# Patient Record
Sex: MALE | Race: BLACK OR AFRICAN AMERICAN | HISPANIC OR LATINO | Employment: OTHER | URBAN - METROPOLITAN AREA
[De-identification: names, ages, dates, MRNs, and addresses within clinical notes are randomized per-mention and may not be internally consistent; named-entity substitution may affect disease eponyms.]

---

## 2017-01-19 ENCOUNTER — GENERIC CONVERSION - ENCOUNTER (OUTPATIENT)
Dept: OTHER | Facility: OTHER | Age: 77
End: 2017-01-19

## 2017-02-02 ENCOUNTER — ALLSCRIPTS OFFICE VISIT (OUTPATIENT)
Dept: OTHER | Facility: OTHER | Age: 77
End: 2017-02-02

## 2017-02-02 DIAGNOSIS — R60.0 LOCALIZED EDEMA: ICD-10-CM

## 2017-02-03 ENCOUNTER — TRANSCRIBE ORDERS (OUTPATIENT)
Dept: ADMINISTRATIVE | Facility: HOSPITAL | Age: 77
End: 2017-02-03

## 2017-02-03 ENCOUNTER — APPOINTMENT (OUTPATIENT)
Dept: LAB | Facility: HOSPITAL | Age: 77
End: 2017-02-03
Payer: COMMERCIAL

## 2017-02-03 DIAGNOSIS — N17.9 ACUTE KIDNEY FAILURE, UNSPECIFIED (HCC): Primary | ICD-10-CM

## 2017-02-03 DIAGNOSIS — R60.0 LOCALIZED EDEMA: ICD-10-CM

## 2017-02-03 LAB
ALBUMIN SERPL BCP-MCNC: 3.4 G/DL (ref 3.5–5)
ALP SERPL-CCNC: 51 U/L (ref 46–116)
ALT SERPL W P-5'-P-CCNC: 23 U/L (ref 12–78)
ANION GAP SERPL CALCULATED.3IONS-SCNC: 7 MMOL/L (ref 4–13)
AST SERPL W P-5'-P-CCNC: 22 U/L (ref 5–45)
BACTERIA UR QL AUTO: ABNORMAL /HPF
BILIRUB SERPL-MCNC: 0.49 MG/DL (ref 0.2–1)
BILIRUB UR QL STRIP: NEGATIVE
BUN SERPL-MCNC: 25 MG/DL (ref 5–25)
CALCIUM SERPL-MCNC: 8.9 MG/DL (ref 8.3–10.1)
CHLORIDE SERPL-SCNC: 112 MMOL/L (ref 100–108)
CLARITY UR: CLEAR
CO2 SERPL-SCNC: 27 MMOL/L (ref 21–32)
COLOR UR: YELLOW
CREAT SERPL-MCNC: 1.75 MG/DL (ref 0.6–1.3)
GFR SERPL CREATININE-BSD FRML MDRD: 38.3 ML/MIN/1.73SQ M
GLUCOSE SERPL-MCNC: 94 MG/DL (ref 65–140)
GLUCOSE UR STRIP-MCNC: NEGATIVE MG/DL
HGB UR QL STRIP.AUTO: NEGATIVE
HYALINE CASTS #/AREA URNS LPF: ABNORMAL /LPF
KETONES UR STRIP-MCNC: NEGATIVE MG/DL
LEUKOCYTE ESTERASE UR QL STRIP: NEGATIVE
NITRITE UR QL STRIP: NEGATIVE
NON-SQ EPI CELLS URNS QL MICRO: ABNORMAL /HPF
PH UR STRIP.AUTO: 5.5 [PH] (ref 4.5–8)
POTASSIUM SERPL-SCNC: 4.4 MMOL/L (ref 3.5–5.3)
PROT SERPL-MCNC: 7.4 G/DL (ref 6.4–8.2)
PROT UR STRIP-MCNC: ABNORMAL MG/DL
RBC #/AREA URNS AUTO: ABNORMAL /HPF
SODIUM SERPL-SCNC: 146 MMOL/L (ref 136–145)
SP GR UR STRIP.AUTO: 1.02 (ref 1–1.03)
UROBILINOGEN UR QL STRIP.AUTO: 0.2 E.U./DL
WBC #/AREA URNS AUTO: ABNORMAL /HPF

## 2017-02-03 PROCEDURE — 81001 URINALYSIS AUTO W/SCOPE: CPT

## 2017-02-03 PROCEDURE — 36415 COLL VENOUS BLD VENIPUNCTURE: CPT

## 2017-02-03 PROCEDURE — 80053 COMPREHEN METABOLIC PANEL: CPT

## 2017-02-10 ENCOUNTER — GENERIC CONVERSION - ENCOUNTER (OUTPATIENT)
Dept: OTHER | Facility: OTHER | Age: 77
End: 2017-02-10

## 2017-02-13 ENCOUNTER — HOSPITAL ENCOUNTER (OUTPATIENT)
Dept: RADIOLOGY | Facility: HOSPITAL | Age: 77
Discharge: HOME | End: 2017-02-13
Payer: COMMERCIAL

## 2017-02-13 DIAGNOSIS — N17.9 ACUTE KIDNEY FAILURE, UNSPECIFIED (HCC): ICD-10-CM

## 2017-02-13 PROCEDURE — 76770 US EXAM ABDO BACK WALL COMP: CPT

## 2017-02-15 ENCOUNTER — GENERIC CONVERSION - ENCOUNTER (OUTPATIENT)
Dept: OTHER | Facility: OTHER | Age: 77
End: 2017-02-15

## 2017-02-27 ENCOUNTER — TRANSCRIBE ORDERS (OUTPATIENT)
Dept: ADMINISTRATIVE | Facility: HOSPITAL | Age: 77
End: 2017-02-27

## 2017-02-27 ENCOUNTER — ALLSCRIPTS OFFICE VISIT (OUTPATIENT)
Dept: OTHER | Facility: OTHER | Age: 77
End: 2017-02-27

## 2017-02-27 DIAGNOSIS — R60.0 LOCALIZED EDEMA: Primary | ICD-10-CM

## 2017-03-08 ENCOUNTER — HOSPITAL ENCOUNTER (OUTPATIENT)
Dept: NON INVASIVE DIAGNOSTICS | Facility: HOSPITAL | Age: 77
Discharge: HOME | End: 2017-03-08
Payer: COMMERCIAL

## 2017-03-08 DIAGNOSIS — R60.0 LOCALIZED EDEMA: ICD-10-CM

## 2017-03-08 PROCEDURE — 93306 TTE W/DOPPLER COMPLETE: CPT

## 2017-04-21 ENCOUNTER — ALLSCRIPTS OFFICE VISIT (OUTPATIENT)
Dept: OTHER | Facility: OTHER | Age: 77
End: 2017-04-21

## 2017-04-25 ENCOUNTER — APPOINTMENT (OUTPATIENT)
Dept: LAB | Facility: HOSPITAL | Age: 77
End: 2017-04-25
Payer: COMMERCIAL

## 2017-04-25 ENCOUNTER — TRANSCRIBE ORDERS (OUTPATIENT)
Dept: LAB | Facility: HOSPITAL | Age: 77
End: 2017-04-25

## 2017-04-25 ENCOUNTER — HOSPITAL ENCOUNTER (OUTPATIENT)
Dept: RADIOLOGY | Facility: HOSPITAL | Age: 77
Discharge: HOME | End: 2017-04-25
Payer: COMMERCIAL

## 2017-04-25 ENCOUNTER — TRANSCRIBE ORDERS (OUTPATIENT)
Dept: RADIOLOGY | Facility: HOSPITAL | Age: 77
End: 2017-04-25

## 2017-04-25 DIAGNOSIS — M54.9 DORSALGIA: ICD-10-CM

## 2017-04-25 DIAGNOSIS — E66.3 OVERWEIGHT(278.02): ICD-10-CM

## 2017-04-25 DIAGNOSIS — N18.30 CHRONIC KIDNEY DISEASE, STAGE III (MODERATE) (HCC): ICD-10-CM

## 2017-04-25 DIAGNOSIS — Z00.00 ENCOUNTER FOR GENERAL ADULT MEDICAL EXAMINATION WITHOUT ABNORMAL FINDINGS: ICD-10-CM

## 2017-04-25 DIAGNOSIS — I10 ESSENTIAL (PRIMARY) HYPERTENSION: ICD-10-CM

## 2017-04-25 LAB
ALBUMIN SERPL BCP-MCNC: 3.2 G/DL (ref 3.5–5)
ALP SERPL-CCNC: 49 U/L (ref 46–116)
ALT SERPL W P-5'-P-CCNC: 22 U/L (ref 12–78)
ANION GAP SERPL CALCULATED.3IONS-SCNC: 5 MMOL/L (ref 4–13)
AST SERPL W P-5'-P-CCNC: 23 U/L (ref 5–45)
BILIRUB SERPL-MCNC: 0.38 MG/DL (ref 0.2–1)
BUN SERPL-MCNC: 28 MG/DL (ref 5–25)
CALCIUM SERPL-MCNC: 8.8 MG/DL (ref 8.3–10.1)
CHLORIDE SERPL-SCNC: 113 MMOL/L (ref 100–108)
CHOLEST SERPL-MCNC: 176 MG/DL (ref 50–200)
CO2 SERPL-SCNC: 27 MMOL/L (ref 21–32)
CREAT SERPL-MCNC: 1.73 MG/DL (ref 0.6–1.3)
EST. AVERAGE GLUCOSE BLD GHB EST-MCNC: 108 MG/DL
GFR SERPL CREATININE-BSD FRML MDRD: 38.7 ML/MIN/1.73SQ M
GLUCOSE P FAST SERPL-MCNC: 97 MG/DL (ref 65–99)
HBA1C MFR BLD: 5.4 % (ref 4.2–6.3)
HDLC SERPL-MCNC: 32 MG/DL (ref 40–60)
LDLC SERPL CALC-MCNC: 116 MG/DL (ref 0–100)
POTASSIUM SERPL-SCNC: 4.4 MMOL/L (ref 3.5–5.3)
PROT SERPL-MCNC: 7.4 G/DL (ref 6.4–8.2)
SODIUM SERPL-SCNC: 145 MMOL/L (ref 136–145)
T4 FREE SERPL-MCNC: 1.02 NG/DL (ref 0.76–1.46)
TRIGL SERPL-MCNC: 142 MG/DL
TSH SERPL DL<=0.05 MIU/L-ACNC: 4.51 UIU/ML (ref 0.36–3.74)

## 2017-04-25 PROCEDURE — 80053 COMPREHEN METABOLIC PANEL: CPT

## 2017-04-25 PROCEDURE — 84443 ASSAY THYROID STIM HORMONE: CPT

## 2017-04-25 PROCEDURE — 84439 ASSAY OF FREE THYROXINE: CPT

## 2017-04-25 PROCEDURE — 80061 LIPID PANEL: CPT

## 2017-04-25 PROCEDURE — 36415 COLL VENOUS BLD VENIPUNCTURE: CPT

## 2017-04-25 PROCEDURE — 83036 HEMOGLOBIN GLYCOSYLATED A1C: CPT

## 2017-04-25 PROCEDURE — 72110 X-RAY EXAM L-2 SPINE 4/>VWS: CPT

## 2017-05-19 ENCOUNTER — HOSPITAL ENCOUNTER (OUTPATIENT)
Dept: NON INVASIVE DIAGNOSTICS | Facility: CLINIC | Age: 77
Discharge: HOME | End: 2017-05-19
Payer: COMMERCIAL

## 2017-05-19 DIAGNOSIS — R60.0 LOCALIZED EDEMA: ICD-10-CM

## 2017-05-19 DIAGNOSIS — E66.3 OVERWEIGHT(278.02): ICD-10-CM

## 2017-05-19 PROCEDURE — 93970 EXTREMITY STUDY: CPT

## 2017-05-22 ENCOUNTER — ALLSCRIPTS OFFICE VISIT (OUTPATIENT)
Dept: OTHER | Facility: OTHER | Age: 77
End: 2017-05-22

## 2017-07-11 ENCOUNTER — GENERIC CONVERSION - ENCOUNTER (OUTPATIENT)
Dept: OTHER | Facility: OTHER | Age: 77
End: 2017-07-11

## 2017-10-03 ENCOUNTER — ALLSCRIPTS OFFICE VISIT (OUTPATIENT)
Dept: OTHER | Facility: OTHER | Age: 77
End: 2017-10-03

## 2017-10-03 DIAGNOSIS — M25.512 PAIN IN LEFT SHOULDER: ICD-10-CM

## 2017-10-03 DIAGNOSIS — M25.511 PAIN IN RIGHT SHOULDER: ICD-10-CM

## 2017-10-04 NOTE — PROGRESS NOTES
Assessment  1  Acromioclavicular joint arthritis (716 91) ([id number])    44-year-old right-hand-dominant male who has prominence of his right distal clavicle due to before meals joint arthritis  It has minimal symptoms today  In addition he has a well compensated shoulder with rotator cuff tear arthropathy  As his symptoms with regard to both modalities are minimal to nonexistent, but no treatment would be warranted  I would welcome the opportunity see this patient back in the office should problems arise     Plan  Acromioclavicular joint arthritis    · Follow-up PRN Evaluation and Treatment  Follow-up  Status: Complete  Done:  [id number] 01:43PM  Chronic pain of both shoulders    · * XR SHOULDER 2+ VIEW LEFT; Status:Active; Requested ZNQ:[id number]; History of Present Illness  HPI: 44-year-old right-hand-dominant male presents for evaluation of mass over the superior aspect of the right shoulder  It occurs without trauma, the size of the mass changes in size  It is not associated in conjunction with motion loss, or weakness  It occurs without neck pain  It occurs in a paresthesia to the right upper extremity  Review of Systems    Constitutional: No fever or chills, feels well, no tiredness, no recent weight loss or weight gain  Eyes: No complaints of red eyes, no eyesight problems  ENT: no complaints of loss of hearing, no nosebleeds, no sore throat  Cardiovascular: No complaints of chest pain, no palpitations, no leg claudication or lower extremity edema  Respiratory: No complaints of shortness of breath, no wheezing, no cough  Gastrointestinal: No complaints of abdominal pain, no constipation, no nausea or vomiting, no diarrhea or bloody stools  Genitourinary: No complaints of dysuria or incontinence, no hesitancy, no nocturia  Musculoskeletal: as noted in HPI  Integumentary: No complaints of skin rash or lesion, no itching or dry skin, no skin wounds     Neurological: No complaints of headache, no confusion, no numbness or tingling, no dizziness  Psychiatric: No suicidal thoughts, no anxiety, no depression  Endocrine: No muscle weakness, no frequent urination, no excessive thirst, no feelings of weakness  Active Problems  1  Abdominal distention (787 3) (R14 0)   2  Abdominal pain (789 00) (R10 9)   3  Acromioclavicular joint separation (831 04) (S43 109A)   4  Acute renal failure (584 9) (N17 9)   5  Back pain (724 5) (M54 9)   6  Benign essential hypertension (401 1) (I10)   7  Benign prostatic hypertrophy without urinary obstruction (600 00) (N40 0)   8  Bilateral edema of lower extremity (782 3) (R60 0)   9  Cellulitis of forearm (682 3) (L03 119)   10  Chronic cough (786 2) (R05)   11  Chronic kidney disease (CKD), stage III (moderate) (585 3) (N18 3)   12  Chronic pain of both knees (111 18,846 82) (M25 561,M25 562,G89 29)   13  Chronic pain of both shoulders (719 41,338 29) (M25 511,M25 512,G89 29)   14  Closed Fracture Of The Distal End Of The Radius (813 42)   15  Colon cancer screening (V76 51) (Z12 11)   16  Itching (698 9) (L29 9)   17  Joint pain, knee (719 46) (M25 569)   18  Kidney problem (593 9) (N28 9)   19  Left forearm pain (729 5) (M79 632)   20  Need for immunization against influenza (V04 81) (Z23)   21  Overweight (278 02) (E66 3)   22  Poison ivy dermatitis (692 6) (L23 7)   23  Primary localized osteoarthritis of both knees (715 16) (M17 0)   24  Primary osteoarthritis of left knee (715 16) (M17 12)   25  Pulmonary nodule seen on imaging study (793 11) (R91 1)   26  Renal function test abnormal (794 4) (R94 4)   27  Right knee pain (719 46) (M25 561)   28  Shortness of breath (786 05) (R06 02)   29  Shoulder joint pain, unspecified laterality    Past Medical History   · History of Arthritis (V13 4)    The active problems and past medical history were reviewed and updated today        Surgical History   · History of Appendectomy   · History of Wrist Surgery    The surgical history was reviewed and updated today  Family History  Father    · Family history of Hypertension (V17 49)    Social History   · Denied: Alcohol   · Former smoker (V15 82) (V27 576)   · Marital History - Currently    · Never a smoker    Current Meds   1  AmLODIPine Besylate 10 MG Oral Tablet; take 1 tablet by mouth once daily; Therapy: 82HFI0509 to (Evaluate:15Jan2018)  Requested for: 20Jan2017; Last   Rx:20Jan2017 Ordered   2  Losartan Potassium 50 MG Oral Tablet; TAKE 1 TABLET BY MOUTH ONCE DAILY  (PATIENT NEEDS TO MAKE AN APPOINTMENT); Therapy: 35OBV6322 to (Emily Alvarez)  Requested for: 21HYH2707; Last   Rx:41Kyo3872 Ordered   3  Omeprazole 20 MG Oral Capsule Delayed Release; TAKE 1 CAPSULE BY MOUTH   DAILY AT BEDTIME; Therapy: 02CGY9806 to (Evaluate:01Apr2018)  Requested for: 33XZT8690; Last   Rx:[id number] Ordered    Allergies  1  No Known Drug Allergies  2  No Known Environmental Allergies   3  No Known Food Allergies    Vitals  Signs   Heart Rate: 69  Systolic: 518  Diastolic: 84  Height: 5 ft 8 in  Weight: 182 lb 8 oz  BMI Calculated: 27 75  BSA Calculated: 1 97    Physical Exam  He pattern is normal without wide-based or staggering gait  His neck is nontender  His right shoulder has enlargement at the before meals joint which is nontender, nonpulsatile  His right shoulder is not effused  He has surprisingly good for flexion, surprisingly good arc of active abduction, slight limitation of internal rotation, but normal external rotation is weakness of external rotation 10 testing, soft and positive drop arm sign  The elbow is nontender  The finger has deformities consistent with osteoarthritis  Fingers are warm, sensate, and mobile  Results/Data  I personally reviewed the films/images/results in the office today  My interpretation follows     X-ray Review X-rays of the right shoulder reveal prominent arthritis in the before meals joint with enlargement of the distal clavicle  There is proximal migration of the humeral head within the glenoid  Future Appointments    Date/Time Provider Specialty Site   10/20/2017 10:30 AM NEETU Tomas   Nephrology [facility]     Signatures   Electronically signed by : NEETU Mondragon ; Oct  3 2017  1:44PM EST                       (Author)

## 2017-10-20 ENCOUNTER — ALLSCRIPTS OFFICE VISIT (OUTPATIENT)
Dept: OTHER | Facility: OTHER | Age: 77
End: 2017-10-20

## 2017-11-01 ENCOUNTER — GENERIC CONVERSION - ENCOUNTER (OUTPATIENT)
Dept: FAMILY MEDICINE CLINIC | Facility: CLINIC | Age: 77
End: 2017-11-01

## 2017-11-07 ENCOUNTER — GENERIC CONVERSION - ENCOUNTER (OUTPATIENT)
Dept: OTHER | Facility: OTHER | Age: 77
End: 2017-11-07

## 2017-11-13 ENCOUNTER — TRANSCRIBE ORDERS (OUTPATIENT)
Dept: ADMINISTRATIVE | Facility: HOSPITAL | Age: 77
End: 2017-11-13

## 2017-11-13 ENCOUNTER — ALLSCRIPTS OFFICE VISIT (OUTPATIENT)
Dept: OTHER | Facility: OTHER | Age: 77
End: 2017-11-13

## 2017-11-13 ENCOUNTER — HOSPITAL ENCOUNTER (OUTPATIENT)
Dept: RADIOLOGY | Facility: HOSPITAL | Age: 77
Discharge: HOME | End: 2017-11-13
Attending: ORTHOPAEDIC SURGERY
Payer: COMMERCIAL

## 2017-11-13 DIAGNOSIS — M25.511 PAIN IN RIGHT SHOULDER: ICD-10-CM

## 2017-11-13 DIAGNOSIS — M75.101 RIGHT ROTATOR CUFF TEAR: ICD-10-CM

## 2017-11-13 DIAGNOSIS — M25.512 PAIN IN LEFT SHOULDER: ICD-10-CM

## 2017-11-13 DIAGNOSIS — M19.211 SECONDARY OSTEOARTHRITIS OF RIGHT SHOULDER: ICD-10-CM

## 2017-11-13 DIAGNOSIS — M19.211 SECONDARY OSTEOARTHRITIS OF RIGHT SHOULDER: Primary | ICD-10-CM

## 2017-11-13 PROCEDURE — 73030 X-RAY EXAM OF SHOULDER: CPT

## 2017-11-14 NOTE — PROGRESS NOTES
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    · Follow Up After Surgery Evaluation and Treatment  Follow-up  Status: Hold For -Scheduling  Requested for: 64UDS8459   · *1 - [facility]  1 to 3 times a week for 8 weeksReverse TSA protocol - ROM, stretching and strengthlocal modalitieshome programthank you  Status: Active  Requested for: 59EKX9323  Care Summary provided  : Yes   · * CT SHOULDER RIGHT WO CONTRAST; Status:Need Information - FinancialAuthorization; Requested for:13Nov2017;   Shoulder pain, right    · * XR SHOULDER 2+ VIEW RIGHT; Status:Active - Retrospective By ProtocolAuthorization; Requested for:13Nov2017;     Discussion/Summary    The patient has symptomatic right shoulder rotator cuff tear arthropathy as well as a symptomatic 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 ( which is secondary to the rotator cuff tear arthropathy)  the cyst can be excised but has a very high rate of recurrence unless we addressed the underlying rotator cuff tear arthropathy and certainly a cyst excision will not help the rotator cuff tear arthropathy symptoms that he is experiencing  In order to treat the symptoms and remove the cyst he is indicated for reverse total shoulder arthroplasty of the right shoulder with excision of the cyst A thorough discussion was performed with the patient reviewing all operative and nonoperative options as well as the risks of the procedure   Risks discussed include but not limited to persistent pain, dislocation, loosening of the prosthesis, infection, need for further surgery including revision, any remaining rotator cuff rupture , neurovascular injury, as well as the risk of anesthesia  After this discussion all questions were answered and informed consent was obtained for Right Reverse Total Shoulder Arthroplasty with excision of ganglion cyst Zach Roe MD interviewed and examined the patient, reviewed the diagnostic imaging and developed/ implemented the plan of care as described in my discussion  The visit was performed with the assistance of my physician assistant Ms Humberto Ramirez who scribed some parts of the note  I personally wrote and developed the discussion  The patient, patient's family was counseled regarding diagnostic results,-- prognosis,-- risks and benefits of treatment options  The patient has the current Goals: Pain-free function of right shoulder  The patent has the current Barriers: No perceived barriers  The risks and benefits of surgery were reviewed with the patient/guardian inclusive of but not limited to infection, failure to alleviate discomfort, failure of procedure, nerve injury, stiffness, blood clots and need for further surgery   Patient is able to Self-Care  Self Referrals: No      History of Present Illness  Kingsley Andino is a 77 y/o male with right shoulder pain for years, but has developed swelling in the right shoulder  The swelling in the right shoulder started about 3 months ago and has worsened  He has noticed some redness over the lump lately  The lump is painful and rubs on his clothing  He is aware he has severe arthritis of the right shoulder and has not had any treatment such as injections for the right shoulder  He has crepitation of the right shoulder  Only has pain from the shoulder joint with motion  He denies numbness or tingling  He denies known injury or trauma to the right shoulder  Review of Systems   Constitutional: No fever or chills, feels well, no tiredness, no recent weight loss or weight gain    Cardiovascular: No complaints of chest pain, no palpitations, no leg claudication or lower extremity edema  Respiratory: No complaints of shortness of breath, no wheezing, no cough  Gastrointestinal: No complaints of abdominal pain, no constipation, no nausea or vomiting, no diarrhea or bloody stools  Musculoskeletal: as noted in HPI  Integumentary: No complaints of skin rash or lesion, no itching or dry skin, no skin wounds  ROS reviewed  Active Problems  1  Abdominal distention (787 3) (R14 0)   2  Abdominal pain (789 00) (R10 9)   3  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)   4  Acute renal failure (584 9) (N17 9)   5  Back pain (724 5) (M54 9)   6  Benign essential hypertension (401 1) (I10)   7  Benign prostatic hypertrophy without urinary obstruction (600 00) (N40 0)   8  Bilateral edema of lower extremity (782 3) (R60 0)   9  Cellulitis of forearm (682 3) (L03 119)   10  Chronic cough (786 2) (R05)   11  Chronic kidney disease (CKD), stage III (moderate) (585 3) (N18 3)   12  Chronic pain of both knees (966 02,917 75) (M25 561,M25 562,G89 29)   13  Chronic pain of both shoulders (719 41,338 29) (M25 511,M25 512,G89 29)   14  Closed Fracture Of The Distal End Of The Radius (813 42)   15  Colon cancer screening (V76 51) (Z12 11)   16  Itching (698 9) (L29 9)   17  Joint pain, knee (719 46) (M25 569)   18  Kidney problem (593 9) (N28 9)   19  Left forearm pain (729 5) (M79 632)   20  Need for immunization against influenza (V04 81) (Z23)   21  Overweight (278 02) (E66 3)   22  Poison ivy dermatitis (692 6) (L23 7)   23  Primary localized osteoarthritis of both knees (715 16) (M17 0)   24  Primary osteoarthritis of left knee (715 16) (M17 12)   25  Pulmonary nodule seen on imaging study (793 11) (R91 1)   26  Renal function test abnormal (794 4) (R94 4)   27  Right knee pain (719 46) (M25 561)   28  Shortness of breath (786 05) (R06 02)   29  Shoulder joint pain, unspecified laterality   30   Shoulder pain, right (289 41) (M23 139)    Past Medical History   · History of Arthritis (V13 4)    The active problems and past medical history were reviewed and updated today  Surgical History   · History of Appendectomy   · History of Wrist Surgery    The surgical history was reviewed and updated today  Family History  Father    · Family history of Hypertension (V17 49)  Family History    · Family history of arthritis (V17 7) (Z82 61)    The family history was reviewed and updated today  Social History     · Denied: Alcohol   · Drinks coffee   · Former smoker (V15 82) (X32 018)   · Marital History - Currently    · Never a smoker  The social history was reviewed and updated today  Current Meds   1  AmLODIPine Besylate 10 MG Oral Tablet; take 1 tablet by mouth once daily; Therapy: 38RWW7326 to (Evaluate:15Jan2018)  Requested for: 20Jan2017; Last Rx:20Jan2017 Ordered   2  Losartan Potassium 50 MG Oral Tablet; TAKE 1 TABLET BY MOUTH ONCE DAILY  (PATIENT NEEDS TO MAKE AN APPOINTMENT); Therapy: 29IKB7160 to (Lois Kruger)  Requested for: 00PUB5325; Last Rx:[id number] Ordered   3  Omeprazole 20 MG Oral Capsule Delayed Release; TAKE 1 CAPSULE BY MOUTH DAILY AT BEDTIME; Therapy: 00BRH3755 to (Evaluate:01Apr2018)  Requested for: 31GCD7207; Last Rx:58Hxs4890 Ordered    The medication list was reviewed and updated today  Allergies  1  No Known Drug Allergies  2  No Known Environmental Allergies   3  No Known Food Allergies    Vitals   Recorded: 90RCR8015 01:01PM   Heart Rate 66   Systolic 073, Sitting   Diastolic 89, Sitting       Physical Exam    Right Shoulder: Appearance: AC joint hypertrophy,-- erythema,-- shoulder is swollen (acromioclavicular joint )-- and-- No GH effusion, but-- no dislocation-- and-- no ecchymosis  Tenderness: AC joint, but-- not the deltoid-- and-- not the trapezial  Palpatory findings include crepitus  ROM: Full except as noted: Motor: 4/5 abduction-- and-- 4/5 external rotation  External rotation: painful restricted AROM 30 degrees   Special Tests: positive Painful Arc,-- positive Empty Can test-- and-- positive Cross Body Adduction test, but-- negative Tinsley test,-- negative Neer test-- and-- negative Drop Arm test   Constitutional - General appearance: Normal   Musculoskeletal - Muscle strength/tone: Normal -- Upper extremity compartments: Normal   Neurologic - Sensation: Normal -- Upper extremity peripheral neuro exam: Normal   Psychiatric - Orientation to person, place, and time: Normal -- Mood and affect: Normal       Results/Data  I personally reviewed the films/images/results in the office today  My interpretation follows  X-ray Review 3 views right shoulder: advanced GH arthritis w/acetabularization of the acromion  AC joint arthritis w/osteophyte formation  Future Appointments    Date/Time Provider Specialty Site   12/18/2017 01:00 PM Zachary Carreon [facility] Orthopedic Surgery [facility] [place] [address] [facility]   12/05/2017 08:30 AM NEETU Farrell  [address] [place] [address] OR       Surgery Scheduling Form  Surgery Schedule Form [facility] Standard:  Location: [place]   Confirmation Number:   PROCEDURE DETAILS   Procedure Date:   Requested Time:  Surgeon: Deborah   Co-Surgeon:   [facility] Required:  Procedure: Right Reverse total shoulder with open cyst excision   Bed:  Laterality/Level: Right  Case Length: 1 5 hours  Anticipated frozen section: NO  Anesthesia: General w/Regional    Procedure Codes: 54537, 33878  Pre-op diagnosis: Right shoulder osteoarthritis with cyst  Diagnosis Code(s): M19 211; M25 819   Equipment:  Equipment Needs: Tornier, ortho set  Implants: Tornier reverse    Is the patient able to walk up a flight of stairs, walk up a hill or do heavy housework WITHOUT having chest pain or shortness of breath?  YES    REGISTRATION & FINANCIAL CLEARANCE   FA Initials:   Insurance:   Policy Number: Group Number:     PRE-ADMISSION TESTING/CLINICAL INFORMATION   PAT Location:       CONSULTS NEEDED: Anesthesia Consult:   Medical Consult:   Cardiac Consult:    ALLERGIES AND ALERTS     Latex Allergy:   Penicillin Allergy:   Malignant Hyperthermia:   Diabetic Patient:     ERAS Patient:   COMMENTS   Scheduling Information Provided By:     CASE MANAGEMENT:      Signatures   Electronically signed by : NEETU Hendrickson ; Nov 13 2017  1:43PM EST                       (Author)

## 2017-11-17 ENCOUNTER — TRANSCRIBE ORDERS (OUTPATIENT)
Dept: LAB | Facility: HOSPITAL | Age: 77
End: 2017-11-17

## 2017-11-17 ENCOUNTER — APPOINTMENT (OUTPATIENT)
Dept: LAB | Facility: HOSPITAL | Age: 77
End: 2017-11-17
Attending: ORTHOPAEDIC SURGERY
Payer: COMMERCIAL

## 2017-11-17 DIAGNOSIS — M19.211 SECONDARY OSTEOARTHRITIS OF RIGHT SHOULDER: ICD-10-CM

## 2017-11-17 DIAGNOSIS — M19.211 SECONDARY OSTEOARTHRITIS OF RIGHT SHOULDER: Primary | ICD-10-CM

## 2017-11-17 LAB
ABO GROUP BLD: NORMAL
ALBUMIN SERPL BCP-MCNC: 3.3 G/DL (ref 3.5–5)
ALP SERPL-CCNC: 54 U/L (ref 46–116)
ALT SERPL W P-5'-P-CCNC: 23 U/L (ref 12–78)
ANION GAP SERPL CALCULATED.3IONS-SCNC: 6 MMOL/L (ref 4–13)
AST SERPL W P-5'-P-CCNC: 27 U/L (ref 5–45)
ATRIAL RATE: 60 BPM
BASOPHILS # BLD AUTO: 0.01 THOUSANDS/ΜL (ref 0–0.1)
BASOPHILS NFR BLD AUTO: 0 % (ref 0–1)
BILIRUB SERPL-MCNC: 0.36 MG/DL (ref 0.2–1)
BLD GP AB SCN SERPL QL: NEGATIVE
BUN SERPL-MCNC: 22 MG/DL (ref 5–25)
CALCIUM SERPL-MCNC: 8.8 MG/DL (ref 8.3–10.1)
CHLORIDE SERPL-SCNC: 113 MMOL/L (ref 100–108)
CO2 SERPL-SCNC: 27 MMOL/L (ref 21–32)
CREAT SERPL-MCNC: 1.51 MG/DL (ref 0.6–1.3)
EOSINOPHIL # BLD AUTO: 0.25 THOUSAND/ΜL (ref 0–0.61)
EOSINOPHIL NFR BLD AUTO: 4 % (ref 0–6)
ERYTHROCYTE [DISTWIDTH] IN BLOOD BY AUTOMATED COUNT: 13.9 % (ref 11.6–15.1)
EST. AVERAGE GLUCOSE BLD GHB EST-MCNC: 105 MG/DL
GFR SERPL CREATININE-BSD FRML MDRD: 45 ML/MIN/1.73SQ M
GLUCOSE P FAST SERPL-MCNC: 86 MG/DL (ref 65–99)
HBA1C MFR BLD: 5.3 % (ref 4.2–6.3)
HCT VFR BLD AUTO: 43.5 % (ref 36.5–49.3)
HGB BLD-MCNC: 14.8 G/DL (ref 12–17)
LYMPHOCYTES # BLD AUTO: 1.78 THOUSANDS/ΜL (ref 0.6–4.47)
LYMPHOCYTES NFR BLD AUTO: 27 % (ref 14–44)
MCH RBC QN AUTO: 30.3 PG (ref 26.8–34.3)
MCHC RBC AUTO-ENTMCNC: 34 G/DL (ref 31.4–37.4)
MCV RBC AUTO: 89 FL (ref 82–98)
MONOCYTES # BLD AUTO: 0.8 THOUSAND/ΜL (ref 0.17–1.22)
MONOCYTES NFR BLD AUTO: 12 % (ref 4–12)
NEUTROPHILS # BLD AUTO: 3.62 THOUSANDS/ΜL (ref 1.85–7.62)
NEUTS SEG NFR BLD AUTO: 57 % (ref 43–75)
NRBC BLD AUTO-RTO: 0 /100 WBCS
P AXIS: 63 DEGREES
PLATELET # BLD AUTO: 215 THOUSANDS/UL (ref 149–390)
PMV BLD AUTO: 10.2 FL (ref 8.9–12.7)
POTASSIUM SERPL-SCNC: 3.9 MMOL/L (ref 3.5–5.3)
PR INTERVAL: 202 MS
PROT SERPL-MCNC: 7.4 G/DL (ref 6.4–8.2)
QRS AXIS: 50 DEGREES
QRSD INTERVAL: 78 MS
QT INTERVAL: 428 MS
QTC INTERVAL: 428 MS
RBC # BLD AUTO: 4.88 MILLION/UL (ref 3.88–5.62)
RH BLD: POSITIVE
SODIUM SERPL-SCNC: 146 MMOL/L (ref 136–145)
SPECIMEN EXPIRATION DATE: NORMAL
T WAVE AXIS: 75 DEGREES
VENTRICULAR RATE: 60 BPM
WBC # BLD AUTO: 6.49 THOUSAND/UL (ref 4.31–10.16)

## 2017-11-17 PROCEDURE — 93005 ELECTROCARDIOGRAM TRACING: CPT

## 2017-11-17 PROCEDURE — 86900 BLOOD TYPING SEROLOGIC ABO: CPT

## 2017-11-17 PROCEDURE — 86850 RBC ANTIBODY SCREEN: CPT

## 2017-11-17 PROCEDURE — 80053 COMPREHEN METABOLIC PANEL: CPT

## 2017-11-17 PROCEDURE — 36415 COLL VENOUS BLD VENIPUNCTURE: CPT

## 2017-11-17 PROCEDURE — 86901 BLOOD TYPING SEROLOGIC RH(D): CPT

## 2017-11-17 PROCEDURE — 85025 COMPLETE CBC W/AUTO DIFF WBC: CPT

## 2017-11-17 PROCEDURE — 83036 HEMOGLOBIN GLYCOSYLATED A1C: CPT

## 2017-11-20 ENCOUNTER — HOSPITAL ENCOUNTER (OUTPATIENT)
Dept: RADIOLOGY | Facility: HOSPITAL | Age: 77
Discharge: HOME | End: 2017-11-20
Payer: COMMERCIAL

## 2017-11-20 DIAGNOSIS — M19.211 SECONDARY OSTEOARTHRITIS OF RIGHT SHOULDER: ICD-10-CM

## 2017-11-20 PROCEDURE — 71020 HB CHEST X-RAY 2VW FRONTAL&LATL: CPT

## 2017-11-20 PROCEDURE — 73200 CT UPPER EXTREMITY W/O DYE: CPT

## 2017-11-27 RX ORDER — TAMSULOSIN HYDROCHLORIDE 0.4 MG/1
0.4 CAPSULE ORAL
COMMUNITY
End: 2019-04-05 | Stop reason: ALTCHOICE

## 2017-11-27 RX ORDER — LOSARTAN POTASSIUM 50 MG/1
25 TABLET ORAL DAILY
COMMUNITY
End: 2017-12-06 | Stop reason: HOSPADM

## 2017-11-27 RX ORDER — OMEPRAZOLE 20 MG/1
20 CAPSULE, DELAYED RELEASE ORAL DAILY
COMMUNITY
End: 2018-04-13 | Stop reason: SDUPTHER

## 2017-11-27 RX ORDER — AMLODIPINE BESYLATE 10 MG/1
10 TABLET ORAL DAILY
COMMUNITY
End: 2018-01-26 | Stop reason: SDUPTHER

## 2017-11-27 NOTE — PRE-PROCEDURE INSTRUCTIONS
Pre-Surgery Instructions:   Medication Instructions    amLODIPine (NORVASC) 10 mg tablet Patient was instructed per "E-Preop"    losartan (COZAAR) 50 mg tablet Patient was instructed per "E-Preop"    omeprazole (PriLOSEC) 20 mg delayed release capsule Patient was instructed per "E-Preop"    tamsulosin (FLOMAX) 0 4 mg Patient was instructed per "E-Preop"     Completed Name Description/Comments Triggered By System Rec        Medication Instruction (ACE/ARB - Blood Pressure Medication)    Please do not take this medication on the morning of your day of surgery  Please restart your medications as soon as clinically feasible  Losartan Potassium 50 MG Oral Tablet          Calcium Blocker (Blood Pressure Medication)    Please continue to take this medication on your normal schedule  If this is an oral medication and you take in the morning, you may do so with a sip of water  AmLODIPine Besylate 10 MG Oral Tablet          Dialysis    If undergoing dialysis, please perform 24 to 48 before surgery and avoid interfering with dialysis schedule  Kidney problem               Accept All Complete All   Last signed: Never    Request Signature   Add New Task Show Removed Tasks  Pre procedure instructions given to daughter Juliana Velázquez  Verbalizes understanding

## 2017-11-28 ENCOUNTER — ALLSCRIPTS OFFICE VISIT (OUTPATIENT)
Dept: OTHER | Facility: OTHER | Age: 77
End: 2017-11-28

## 2017-12-04 ENCOUNTER — APPOINTMENT (OUTPATIENT)
Dept: PHYSICAL THERAPY | Age: 77
End: 2017-12-04
Payer: COMMERCIAL

## 2017-12-04 ENCOUNTER — GENERIC CONVERSION - ENCOUNTER (OUTPATIENT)
Dept: OBGYN CLINIC | Facility: OTHER | Age: 77
End: 2017-12-04

## 2017-12-04 DIAGNOSIS — M19.211 SECONDARY OSTEOARTHRITIS OF RIGHT SHOULDER: ICD-10-CM

## 2017-12-04 PROCEDURE — G8985 CARRY GOAL STATUS: HCPCS

## 2017-12-04 PROCEDURE — 97162 PT EVAL MOD COMPLEX 30 MIN: CPT

## 2017-12-04 PROCEDURE — G8984 CARRY CURRENT STATUS: HCPCS

## 2017-12-05 ENCOUNTER — HOSPITAL ENCOUNTER (INPATIENT)
Facility: HOSPITAL | Age: 77
LOS: 1 days | Discharge: HOME | DRG: 483 | End: 2017-12-06
Attending: ORTHOPAEDIC SURGERY | Admitting: ORTHOPAEDIC SURGERY
Payer: COMMERCIAL

## 2017-12-05 ENCOUNTER — APPOINTMENT (INPATIENT)
Dept: RADIOLOGY | Facility: HOSPITAL | Age: 77
DRG: 483 | End: 2017-12-05
Attending: ORTHOPAEDIC SURGERY
Payer: COMMERCIAL

## 2017-12-05 ENCOUNTER — ANESTHESIA (OUTPATIENT)
Dept: PERIOP | Facility: HOSPITAL | Age: 77
DRG: 483 | End: 2017-12-05
Payer: COMMERCIAL

## 2017-12-05 ENCOUNTER — ANESTHESIA EVENT (OUTPATIENT)
Dept: PERIOP | Facility: HOSPITAL | Age: 77
DRG: 483 | End: 2017-12-05
Payer: COMMERCIAL

## 2017-12-05 DIAGNOSIS — M12.811 ROTATOR CUFF TEAR ARTHROPATHY, RIGHT: Primary | Chronic | ICD-10-CM

## 2017-12-05 DIAGNOSIS — M75.101 ROTATOR CUFF TEAR ARTHROPATHY, RIGHT: Primary | Chronic | ICD-10-CM

## 2017-12-05 DIAGNOSIS — I10 ESSENTIAL HYPERTENSION, BENIGN: ICD-10-CM

## 2017-12-05 DIAGNOSIS — N18.30 STAGE 3 CHRONIC KIDNEY DISEASE (HCC): ICD-10-CM

## 2017-12-05 PROBLEM — M19.211 SECONDARY OSTEOARTHRITIS OF RIGHT SHOULDER: Status: ACTIVE | Noted: 2017-12-05

## 2017-12-05 LAB
ABO GROUP BLD: NORMAL
BLD GP AB SCN SERPL QL: NEGATIVE
POTASSIUM SERPL-SCNC: 4.1 MMOL/L (ref 3.5–5.3)
RH BLD: POSITIVE
SPECIMEN EXPIRATION DATE: NORMAL

## 2017-12-05 PROCEDURE — C1776 JOINT DEVICE (IMPLANTABLE): HCPCS | Performed by: ORTHOPAEDIC SURGERY

## 2017-12-05 PROCEDURE — 86850 RBC ANTIBODY SCREEN: CPT | Performed by: ORTHOPAEDIC SURGERY

## 2017-12-05 PROCEDURE — 0RBJ0ZZ EXCISION OF RIGHT SHOULDER JOINT, OPEN APPROACH: ICD-10-PCS | Performed by: ORTHOPAEDIC SURGERY

## 2017-12-05 PROCEDURE — C1713 ANCHOR/SCREW BN/BN,TIS/BN: HCPCS | Performed by: ORTHOPAEDIC SURGERY

## 2017-12-05 PROCEDURE — 84132 ASSAY OF SERUM POTASSIUM: CPT | Performed by: ORTHOPAEDIC SURGERY

## 2017-12-05 PROCEDURE — 86900 BLOOD TYPING SEROLOGIC ABO: CPT | Performed by: ORTHOPAEDIC SURGERY

## 2017-12-05 PROCEDURE — 86901 BLOOD TYPING SEROLOGIC RH(D): CPT | Performed by: ORTHOPAEDIC SURGERY

## 2017-12-05 PROCEDURE — 0RRJ00Z REPLACEMENT OF RIGHT SHOULDER JOINT WITH REVERSE BALL AND SOCKET SYNTHETIC SUBSTITUTE, OPEN APPROACH: ICD-10-PCS | Performed by: ORTHOPAEDIC SURGERY

## 2017-12-05 PROCEDURE — 73020 X-RAY EXAM OF SHOULDER: CPT

## 2017-12-05 DEVICE — SCREW PERIPHERAL 5 X 34MM: Type: IMPLANTABLE DEVICE | Site: SHOULDER | Status: FUNCTIONAL

## 2017-12-05 DEVICE — GLENOSPHERE STD 36MM: Type: IMPLANTABLE DEVICE | Site: SHOULDER | Status: FUNCTIONAL

## 2017-12-05 DEVICE — BASEPLATE STD 25MM: Type: IMPLANTABLE DEVICE | Site: SHOULDER | Status: FUNCTIONAL

## 2017-12-05 DEVICE — IMPLANTABLE DEVICE
Type: IMPLANTABLE DEVICE | Site: SHOULDER | Status: FUNCTIONAL
Brand: FLEX SHOULDER SYSTEM

## 2017-12-05 DEVICE — IMPLANTABLE DEVICE
Type: IMPLANTABLE DEVICE | Site: SHOULDER | Status: FUNCTIONAL
Brand: AEQUALIS™ ASCEND™ FLEX

## 2017-12-05 DEVICE — SCREW PERIPHERAL 5 X 26MM: Type: IMPLANTABLE DEVICE | Site: SHOULDER | Status: FUNCTIONAL

## 2017-12-05 DEVICE — SCREW CENTRAL 6 X 40MM: Type: IMPLANTABLE DEVICE | Site: SHOULDER | Status: FUNCTIONAL

## 2017-12-05 RX ORDER — SODIUM CHLORIDE, SODIUM LACTATE, POTASSIUM CHLORIDE, CALCIUM CHLORIDE 600; 310; 30; 20 MG/100ML; MG/100ML; MG/100ML; MG/100ML
100 INJECTION, SOLUTION INTRAVENOUS CONTINUOUS
Status: DISPENSED | OUTPATIENT
Start: 2017-12-05 | End: 2017-12-06

## 2017-12-05 RX ORDER — OXYCODONE HYDROCHLORIDE 10 MG/1
10 TABLET ORAL EVERY 4 HOURS PRN
Status: DISCONTINUED | OUTPATIENT
Start: 2017-12-05 | End: 2017-12-06 | Stop reason: HOSPADM

## 2017-12-05 RX ORDER — LABETALOL HYDROCHLORIDE 5 MG/ML
5 INJECTION, SOLUTION INTRAVENOUS
Status: DISCONTINUED | OUTPATIENT
Start: 2017-12-05 | End: 2017-12-05 | Stop reason: HOSPADM

## 2017-12-05 RX ORDER — FENTANYL CITRATE/PF 50 MCG/ML
25 SYRINGE (ML) INJECTION
Status: DISCONTINUED | OUTPATIENT
Start: 2017-12-05 | End: 2017-12-05 | Stop reason: HOSPADM

## 2017-12-05 RX ORDER — MAGNESIUM HYDROXIDE 1200 MG/15ML
LIQUID ORAL AS NEEDED
Status: DISCONTINUED | OUTPATIENT
Start: 2017-12-05 | End: 2017-12-05 | Stop reason: HOSPADM

## 2017-12-05 RX ORDER — DOCUSATE SODIUM 100 MG/1
100 CAPSULE, LIQUID FILLED ORAL 2 TIMES DAILY
Status: DISCONTINUED | OUTPATIENT
Start: 2017-12-05 | End: 2017-12-06 | Stop reason: HOSPADM

## 2017-12-05 RX ORDER — PROPOFOL 10 MG/ML
INJECTION, EMULSION INTRAVENOUS AS NEEDED
Status: DISCONTINUED | OUTPATIENT
Start: 2017-12-05 | End: 2017-12-05 | Stop reason: SURG

## 2017-12-05 RX ORDER — LIDOCAINE HYDROCHLORIDE 10 MG/ML
INJECTION, SOLUTION INFILTRATION; PERINEURAL AS NEEDED
Status: DISCONTINUED | OUTPATIENT
Start: 2017-12-05 | End: 2017-12-05 | Stop reason: SURG

## 2017-12-05 RX ORDER — TAMSULOSIN HYDROCHLORIDE 0.4 MG/1
0.4 CAPSULE ORAL
Status: DISCONTINUED | OUTPATIENT
Start: 2017-12-05 | End: 2017-12-06 | Stop reason: HOSPADM

## 2017-12-05 RX ORDER — HYDRALAZINE HYDROCHLORIDE 25 MG/1
25 TABLET, FILM COATED ORAL EVERY 8 HOURS PRN
Status: DISCONTINUED | OUTPATIENT
Start: 2017-12-05 | End: 2017-12-06 | Stop reason: HOSPADM

## 2017-12-05 RX ORDER — GLYCOPYRROLATE 0.2 MG/ML
INJECTION INTRAMUSCULAR; INTRAVENOUS AS NEEDED
Status: DISCONTINUED | OUTPATIENT
Start: 2017-12-05 | End: 2017-12-05 | Stop reason: SURG

## 2017-12-05 RX ORDER — AMLODIPINE BESYLATE 10 MG/1
10 TABLET ORAL DAILY
Status: DISCONTINUED | OUTPATIENT
Start: 2017-12-05 | End: 2017-12-06

## 2017-12-05 RX ORDER — LOSARTAN POTASSIUM 50 MG/1
25 TABLET ORAL DAILY
Status: DISCONTINUED | OUTPATIENT
Start: 2017-12-05 | End: 2017-12-05

## 2017-12-05 RX ORDER — PANTOPRAZOLE SODIUM 40 MG/1
40 TABLET, DELAYED RELEASE ORAL
Status: DISCONTINUED | OUTPATIENT
Start: 2017-12-05 | End: 2017-12-06 | Stop reason: HOSPADM

## 2017-12-05 RX ORDER — SUCCINYLCHOLINE CHLORIDE 20 MG/ML
INJECTION INTRAMUSCULAR; INTRAVENOUS AS NEEDED
Status: DISCONTINUED | OUTPATIENT
Start: 2017-12-05 | End: 2017-12-05 | Stop reason: SURG

## 2017-12-05 RX ORDER — SODIUM CHLORIDE, SODIUM LACTATE, POTASSIUM CHLORIDE, CALCIUM CHLORIDE 600; 310; 30; 20 MG/100ML; MG/100ML; MG/100ML; MG/100ML
125 INJECTION, SOLUTION INTRAVENOUS CONTINUOUS
Status: DISCONTINUED | OUTPATIENT
Start: 2017-12-05 | End: 2017-12-05

## 2017-12-05 RX ORDER — ACETAMINOPHEN 325 MG/1
650 TABLET ORAL EVERY 6 HOURS PRN
Status: DISCONTINUED | OUTPATIENT
Start: 2017-12-05 | End: 2017-12-06 | Stop reason: HOSPADM

## 2017-12-05 RX ORDER — CALCIUM CARBONATE 200(500)MG
1000 TABLET,CHEWABLE ORAL DAILY PRN
Status: DISCONTINUED | OUTPATIENT
Start: 2017-12-05 | End: 2017-12-06 | Stop reason: HOSPADM

## 2017-12-05 RX ORDER — ALBUTEROL SULFATE 2.5 MG/3ML
2.5 SOLUTION RESPIRATORY (INHALATION) ONCE AS NEEDED
Status: DISCONTINUED | OUTPATIENT
Start: 2017-12-05 | End: 2017-12-05 | Stop reason: HOSPADM

## 2017-12-05 RX ORDER — ROPIVACAINE HYDROCHLORIDE 5 MG/ML
INJECTION, SOLUTION EPIDURAL; INFILTRATION; PERINEURAL AS NEEDED
Status: DISCONTINUED | OUTPATIENT
Start: 2017-12-05 | End: 2017-12-05 | Stop reason: SURG

## 2017-12-05 RX ORDER — ONDANSETRON 2 MG/ML
4 INJECTION INTRAMUSCULAR; INTRAVENOUS ONCE AS NEEDED
Status: DISCONTINUED | OUTPATIENT
Start: 2017-12-05 | End: 2017-12-05 | Stop reason: HOSPADM

## 2017-12-05 RX ORDER — EPHEDRINE SULFATE 50 MG/ML
INJECTION, SOLUTION INTRAVENOUS AS NEEDED
Status: DISCONTINUED | OUTPATIENT
Start: 2017-12-05 | End: 2017-12-05 | Stop reason: SURG

## 2017-12-05 RX ORDER — MIDAZOLAM HYDROCHLORIDE 1 MG/ML
INJECTION INTRAMUSCULAR; INTRAVENOUS AS NEEDED
Status: DISCONTINUED | OUTPATIENT
Start: 2017-12-05 | End: 2017-12-05 | Stop reason: SURG

## 2017-12-05 RX ORDER — OXYCODONE HYDROCHLORIDE 5 MG/1
5 TABLET ORAL EVERY 4 HOURS PRN
Status: DISCONTINUED | OUTPATIENT
Start: 2017-12-05 | End: 2017-12-06 | Stop reason: HOSPADM

## 2017-12-05 RX ORDER — ONDANSETRON 2 MG/ML
INJECTION INTRAMUSCULAR; INTRAVENOUS AS NEEDED
Status: DISCONTINUED | OUTPATIENT
Start: 2017-12-05 | End: 2017-12-05 | Stop reason: SURG

## 2017-12-05 RX ORDER — OXYCODONE HYDROCHLORIDE 5 MG/1
TABLET ORAL
Qty: 30 TABLET | Refills: 0 | Status: SHIPPED | OUTPATIENT
Start: 2017-12-05 | End: 2019-04-05 | Stop reason: ALTCHOICE

## 2017-12-05 RX ORDER — ROCURONIUM BROMIDE 10 MG/ML
INJECTION, SOLUTION INTRAVENOUS AS NEEDED
Status: DISCONTINUED | OUTPATIENT
Start: 2017-12-05 | End: 2017-12-05 | Stop reason: SURG

## 2017-12-05 RX ORDER — ONDANSETRON 2 MG/ML
4 INJECTION INTRAMUSCULAR; INTRAVENOUS EVERY 6 HOURS PRN
Status: DISCONTINUED | OUTPATIENT
Start: 2017-12-05 | End: 2017-12-06 | Stop reason: HOSPADM

## 2017-12-05 RX ORDER — FENTANYL CITRATE 50 UG/ML
INJECTION, SOLUTION INTRAMUSCULAR; INTRAVENOUS AS NEEDED
Status: DISCONTINUED | OUTPATIENT
Start: 2017-12-05 | End: 2017-12-05 | Stop reason: SURG

## 2017-12-05 RX ORDER — MEPERIDINE HYDROCHLORIDE 25 MG/ML
12.5 INJECTION INTRAMUSCULAR; INTRAVENOUS; SUBCUTANEOUS AS NEEDED
Status: DISCONTINUED | OUTPATIENT
Start: 2017-12-05 | End: 2017-12-05 | Stop reason: HOSPADM

## 2017-12-05 RX ADMIN — ROCURONIUM BROMIDE 40 MG: 10 INJECTION INTRAVENOUS at 08:00

## 2017-12-05 RX ADMIN — DOCUSATE SODIUM 100 MG: 100 CAPSULE, LIQUID FILLED ORAL at 11:16

## 2017-12-05 RX ADMIN — PANTOPRAZOLE SODIUM 40 MG: 40 TABLET, DELAYED RELEASE ORAL at 11:16

## 2017-12-05 RX ADMIN — LOSARTAN POTASSIUM 25 MG: 50 TABLET, FILM COATED ORAL at 11:16

## 2017-12-05 RX ADMIN — FENTANYL CITRATE 100 MCG: 50 INJECTION, SOLUTION INTRAMUSCULAR; INTRAVENOUS at 07:46

## 2017-12-05 RX ADMIN — CEFAZOLIN SODIUM 2000 MG: 2 SOLUTION INTRAVENOUS at 17:43

## 2017-12-05 RX ADMIN — SODIUM CHLORIDE, SODIUM LACTATE, POTASSIUM CHLORIDE, AND CALCIUM CHLORIDE: .6; .31; .03; .02 INJECTION, SOLUTION INTRAVENOUS at 09:14

## 2017-12-05 RX ADMIN — MIDAZOLAM HYDROCHLORIDE 2 MG: 1 INJECTION, SOLUTION INTRAMUSCULAR; INTRAVENOUS at 07:37

## 2017-12-05 RX ADMIN — NEOSTIGMINE METHYLSULFATE 4 MG: 1 INJECTION, SOLUTION INTRAMUSCULAR; INTRAVENOUS; SUBCUTANEOUS at 09:11

## 2017-12-05 RX ADMIN — EPHEDRINE SULFATE 5 MG: 50 INJECTION, SOLUTION INTRAMUSCULAR; INTRAVENOUS; SUBCUTANEOUS at 08:00

## 2017-12-05 RX ADMIN — TAMSULOSIN HYDROCHLORIDE 0.4 MG: 0.4 CAPSULE ORAL at 17:42

## 2017-12-05 RX ADMIN — EPHEDRINE SULFATE 10 MG: 50 INJECTION, SOLUTION INTRAMUSCULAR; INTRAVENOUS; SUBCUTANEOUS at 07:51

## 2017-12-05 RX ADMIN — SODIUM CHLORIDE, POTASSIUM CHLORIDE, SODIUM LACTATE AND CALCIUM CHLORIDE 100 ML/HR: 600; 310; 30; 20 INJECTION, SOLUTION INTRAVENOUS at 11:20

## 2017-12-05 RX ADMIN — DOCUSATE SODIUM 100 MG: 100 CAPSULE, LIQUID FILLED ORAL at 17:42

## 2017-12-05 RX ADMIN — EPHEDRINE SULFATE 10 MG: 50 INJECTION, SOLUTION INTRAMUSCULAR; INTRAVENOUS; SUBCUTANEOUS at 07:48

## 2017-12-05 RX ADMIN — ROPIVACAINE HYDROCHLORIDE 30 ML: 5 INJECTION, SOLUTION EPIDURAL; INFILTRATION; PERINEURAL at 07:15

## 2017-12-05 RX ADMIN — DEXAMETHASONE SODIUM PHOSPHATE 10 MG: 10 INJECTION INTRAMUSCULAR; INTRAVENOUS at 08:26

## 2017-12-05 RX ADMIN — GLYCOPYRROLATE 0.8 MG: 0.2 INJECTION, SOLUTION INTRAMUSCULAR; INTRAVENOUS at 09:11

## 2017-12-05 RX ADMIN — PHENYLEPHRINE HYDROCHLORIDE 50 MCG/MIN: 10 INJECTION INTRAVENOUS at 08:07

## 2017-12-05 RX ADMIN — PROPOFOL 200 MG: 10 INJECTION, EMULSION INTRAVENOUS at 07:46

## 2017-12-05 RX ADMIN — SODIUM CHLORIDE, SODIUM LACTATE, POTASSIUM CHLORIDE, AND CALCIUM CHLORIDE: .6; .31; .03; .02 INJECTION, SOLUTION INTRAVENOUS at 07:24

## 2017-12-05 RX ADMIN — SODIUM CHLORIDE, POTASSIUM CHLORIDE, SODIUM LACTATE AND CALCIUM CHLORIDE 100 ML/HR: 600; 310; 30; 20 INJECTION, SOLUTION INTRAVENOUS at 20:05

## 2017-12-05 RX ADMIN — AMLODIPINE BESYLATE 10 MG: 10 TABLET ORAL at 11:16

## 2017-12-05 RX ADMIN — ONDANSETRON 4 MG: 2 INJECTION INTRAMUSCULAR; INTRAVENOUS at 08:58

## 2017-12-05 RX ADMIN — CEFAZOLIN SODIUM 2000 MG: 2 SOLUTION INTRAVENOUS at 07:50

## 2017-12-05 RX ADMIN — SUCCINYLCHOLINE CHLORIDE 100 MG: 20 INJECTION, SOLUTION INTRAMUSCULAR; INTRAVENOUS at 07:46

## 2017-12-05 RX ADMIN — OXYCODONE HYDROCHLORIDE 10 MG: 10 TABLET ORAL at 23:12

## 2017-12-05 RX ADMIN — LIDOCAINE HYDROCHLORIDE 50 MG: 10 INJECTION, SOLUTION INFILTRATION; PERINEURAL at 07:46

## 2017-12-05 RX ADMIN — ROPIVACAINE HYDROCHLORIDE 8 ML/HR: 2 INJECTION, SOLUTION EPIDURAL; INFILTRATION at 10:00

## 2017-12-05 NOTE — OP NOTE
OPERATIVE REPORT  PATIENT NAME: Emelina SEBASTIAN Core    :  1940  MRN: [id number]  Pt Location:  OR ROOM 15    SURGERY DATE: 2017     SURGEON: Crystal Garcia MD     ASSISTANT: Flori Fraser PA-C     NOTE: Flori Fraser PA-C was present throughout the entire procedure and performed essential assistance with patient prepping, draping, positioning, suture management, wound closure, sterile dressing application and sling application, all under my direct supervision  NOTE: No qualified resident physician was available for assistance    PREOPERATIVE DIAGNOSIS: Right Shoulder rotator cuff tear associated osteoarthritis with Superior AC Joint/[place] Cyst    POSTOPERATIVE DIAGNOSIS: Same    PROCEDURES: 1) Right Shoulder with Reverse Total Shoulder Arthroplasty  2) AC Joint/[place] Ganglion Cyst Excision    ANESTHESIA STAFF: Yissel Fernandez MD     ANESTHESIA TYPE: General with endotracheal tube with interscalene block and catheter placement    COMPLICATIONS: None    FINDINGS: Rotator Cuff Tear Arthritis and 2 5 x 3 5 cm AC Joint Cyst    SPECIMEN(S): none    ESTIMATED BLOOD LOSS: 150 mL    INDICATIONS FOR PROCEDURE:  The patient is a 78 y o  male presenting with pain and lack of function secondary to rotator cuff tear associated osteoarthritis of the right shoulder, the patient also had a large acromioclavicular joint ganglion cyst related to his rotator cuff tear arthropathy  After a thorough discussion of the risks and benefits of operative and nonoperative care the patient elected for right reverse total shoulder arthroplasty with excision of the acromioclavicular joint cyst  Informed consent was obtained in the office  OPERATIVE TECHNIQUE:  The day of surgery I identified the patient's right shoulder and marked it with my initials    The patient was taken back to the operating room where endotracheal tube with interscalene block and catheter placement  was placed by the anesthesia staff without complication  The patient was placed in the beachchair position with all bony prominences padded  The right shoulder was prepped and draped in routine sterile fashion and after a time-out for safety and confirming 2 grams of IV Cefazolin were given, a incision overlying the acromioclavicular joint cyst was made  The cyst was dissected freely from the surrounding soft tissue, measured 2 5 x 3 5 cm and the stalk was found to emanate from the acromioclavicular joint  The cyst was then excised confirming its contents were gelatinous synovial fluid consistent with ganglion cyst   Hemostasis was achieved and the incision was irrigated and closed with 2-0 Vicryl for the subdermal layer and staples for skin  Attention was then turned to the reverse total shoulder arthroplasty procedure  A standard deltopectoral approach was performed  The dissection was carried down to the subscapularis which was completely torn and retracted and was not available for later repair    The long head of biceps was torn and not present in the surgical field  The humeral head was exposed and found to have severe degenerative change with no remaining rotator cuff attached  The humerus was prepared keeping with the surgical technique for a Tornier Flex reverse prosthesis  After preparing the humerus the glenoid was exposed and prepared for a 25 mm Tornier Perform Plus baseplate  There was severe glenoid wear so version was corrected and bone graft from the humeral head cut was fashioned and placed to support the baseplate posterior superiorly  The baseplate was placed and then secured with a central 6 5 mm diameter 40 mm length screw  Additional fixation was achieved with a 26 mm non-locking screw anteriorly followed by a superior 34 mm and an inferior 34 mm locking screws  These achieved excellent fixation of the baseplate to the glenoid  A 36 mm glenosphere was then impacted and secured   The humerus was finished and a size 5B Torneir Flex Stem with a low-offset tray and a +9 mm polyethylene was trialed and found to have excellent stability with full range of motion and appropriate soft tissue tension  Final implants were placed and the area was irrigated with pulse lavage  The deltopectoral interval was loosely closed with 0 Vicryl and the subdermal layer with 2-0 Vicryl with staples for skin  Sterile dressings and a sling with abduction pillow was placed and the patient was awoken  The patient was transported to the recovery room in good condition and will be admitted for post-operative care and physical therapy will be initiated following the standard reverse total shoulder arthroplasty protocol      PATIENT DISPOSITION:  Stable to PACU      SIGNATURE: Destiny Stewart MD  DATE: December 5, 2017  TIME: 9:36 AM

## 2017-12-05 NOTE — CONSULTS
SUBJECTIVE:   Dimple Oviedo is a 86 year old female who presents to clinic today for the following health issues:      Hyperlipidemia Follow-Up    Rate your low fat/cholesterol diet?: good    Taking statin?  Yes, no muscle aches from statin    Other lipid medications/supplements?:  none    Hypertension Follow-up    Outpatient blood pressures are not being checked. But was at eye dr and BP was high 176/60's on Monday.     Low Salt Diet: not monitoring salt    Amount of exercise or physical activity: None    Problems taking medications regularly: No    Medication side effects: none  Diet: regular (no restrictions)  Additional: pt would like to discuss mid back pain,has stenosis. Pain when walking     Next Saturday - will be going to [place] for a month. Daughter is having hip replacement and she is going to help her.   Having fatigue.   Hard to walk too long due to back pain. Some short of breath with activities.   Has leg swelling which is chronic for her.   Had antibiotics 2 months ago for bladder infection.     Problem list and histories reviewed & adjusted, as indicated.  Additional history: as documented    Labs reviewed in EPIC    Reviewed and updated as needed this visit by clinical staff     Reviewed and updated as needed this visit by Provider      Social History     Social History     Marital status:      Spouse name:      Number of children: 2     Years of education: N/A     Occupational History      Retired     Social History Main Topics     Smoking status: Never Smoker     Smokeless tobacco: Never Used     Alcohol use No      Comment: 6 times per year-one drink     Drug use: No     Sexual activity: Yes     Partners: Male     Other Topics Concern      Service No     Blood Transfusions No     Exercise Yes     curves     Seat Belt Yes     Self-Exams Yes     Parent/Sibling W/ Cabg, Mi Or Angioplasty Before 65f 55m? No     Social History Narrative    December 7, 2009    Balanced Diet - Yes  Consultation - Corona Peterson Core 78 y o  male MRN: [id number]    Unit/Bed#: CW3 345-01 Encounter: [id number]        History of Present Illness     HPI: Villa Gallardo is a 78y o  year old male with a history of right rotator cuff tear, HTN, CKD III baseline 1 6-1 7, BPH, leg edema who underwent a right reverse total shoulder arthroplasy and cyst excision with Dr Shefali Preciado  Currently, he is hemodynamically stable  Offers no c/o CP, SOB, dizziness, nausea  Creatinine baseline is 1 6-1 7 per Dr Reji Lebron office notes  Last creatinine was on 11/17/17 at 1 51  He has a history of HTN and takes Losartan 50mg qd and Norvasc 10mg qd  He has been having issues with LE edema and had an ECHO done showing LVEF 60%/mild TR and Venous doppler negative for DVT  He takes no diuretic for the edema  Question whether the Norvasc is causing this issue but from review of records it appears that he has been on this since at least 6/2015      ROS:  As in HPI, otherwise negative 12 point ROS       Historical Information   Past Medical History:   Diagnosis Date    Arthritis     BPH without urinary obstruction     CKD (chronic kidney disease), stage III     baseline 1 6-1 7    GERD (gastroesophageal reflux disease)     Hyperlipidemia     Hypertension      Past Surgical History:   Procedure Laterality Date    APPENDECTOMY       Social History   History   Alcohol Use No     History   Drug Use No     History   Smoking Status    Former Smoker    Packs/day: 1 00    Quit date: 1980   Smokeless Tobacco    Never Used     Family History   Problem Relation Age of Onset    No Known Problems Mother     No Known Problems Father        Meds/Allergies   current meds:  Current Facility-Administered Medications   Medication Dose Route Frequency    acetaminophen (TYLENOL) tablet 650 mg  650 mg Oral Q6H PRN    amLODIPine (NORVASC) tablet 10 mg  10 mg Oral Daily    calcium carbonate (TUMS) chewable tablet 1,000 mg  1,000 mg Oral Daily PRN "   Osteoporosis Prevention Measures - Dairy servings per day: 2 and Medication/Supplements (See current meds)    Regular Exercise -  Yes Describe curves, walking    Dental Exam - YES - Date: 10/2009    Eye Exam - YES - Date: 2008    Self Breast Exam - Yes    Abuse: Current or Past (Physical, Sexual or Emotional)- No    Do you feel safe in your environment - Yes    Guns stored in the home - No    Sunscreen used - Yes    Seatbelts used - Yes    Lipids -  YES - Date: 11/24/2009    Glucose -  YES - Date: 11/24/2009    Colon Cancer Screening - Colonoscopy 11/18/2005(date completed)    Hemoccults - YES - Date: 10/12/2004    Pap Test -  YES - Date: 09/22/2004    Do you have any concerns about STD's -  No    Mammography - YES - Date: 11/24/2009    DEXA - YES - Date: 11/20/2008    Immunizations reviewed and up to date - Yes    PAULETTE Spencer CMA                 Allergies   Allergen Reactions     No Known Drug Allergies      Patient Active Problem List   Diagnosis     Esophageal reflux     restless leg     heat intolerance     Goiter     Disorder of bone and cartilage     Other psoriasis     perirectal cyst     Malignant melanoma of skin of trunk, except scrotum (H)     Undiagnosed cardiac murmurs     Nontoxic multinodular goiter     Hyperlipidemia LDL goal <130     Hypertension goal BP (blood pressure) < 140/90     Urinary incontinence     Osteoarthritis of left knee     Hip arthritis     Polymyalgia rheumatica (H)     High risk medication use     Shoulder pain     Sepsis (H)     Impaired fasting blood sugar     Chronic bilateral low back pain without sciatica     Obesity, unspecified obesity severity, unspecified obesity type     Reviewed medications, social history and  past medical and surgical history.    Review of system: for general, respiratory, CVS, GI and psychiatry negative except for noted above.     EXAM:  /68 (Cuff Size: Adult Large)  Pulse 70  Temp 98  F (36.7  C) (Oral)  Ht 4' 10\" (1.473 m)  Wt 170 lb 4 "  ceFAZolin (ANCEF) IVPB (premix) 2,000 mg  2,000 mg Intravenous Q8H    docusate sodium (COLACE) capsule 100 mg  100 mg Oral BID    HYDROmorphone (DILAUDID) 1 mg/mL injection 0 5 mg  0 5 mg Intravenous Q2H PRN    lactated ringers infusion  100 mL/hr Intravenous Continuous    ondansetron (ZOFRAN) injection 4 mg  4 mg Intravenous Q6H PRN    oxyCODONE (ROXICODONE) immediate release tablet 10 mg  10 mg Oral Q4H PRN    oxyCODONE (ROXICODONE) IR tablet 5 mg  5 mg Oral Q4H PRN    pantoprazole (PROTONIX) EC tablet 40 mg  40 mg Oral Early Morning    ropivacaine (NAROPIN) 0 2 % 600 mL in elastomeric reservoir (AMBU ACTION BLOCK PUMP) continuous peripheral nerve block  8 mL/hr Subcutaneous Continuous    tamsulosin (FLOMAX) capsule 0 4 mg  0 4 mg Oral Daily With Dinner       PTA meds:   Prescriptions Prior to Admission   Medication    amLODIPine (NORVASC) 10 mg tablet    losartan (COZAAR) 50 mg tablet    omeprazole (PriLOSEC) 20 mg delayed release capsule    tamsulosin (FLOMAX) 0 4 mg     No Known Allergies    Objective   Vitals: Blood pressure 148/83, pulse 85, temperature (!) 97 3 °F (36 3 °C), temperature source Oral, resp  rate 20, height 5' 9" (1 753 m), weight 84 8 kg (187 lb), SpO2 94 %      Physical Exam      Constitutional:  NAD; pleasant; nontoxic  HEENT:  AT/NC; oropharynx negative for thrush  Neck: negative for JVD/lymphadenopathy   CV:  +S1, S2;  RRR; no rub/murmur  Pulmonary:  BBS without crackles/wheeze/rhonci; resp are unlabored  Abdominal:  soft, +BS, ND/NT; no mass  Musculoskeletal:  Tr-mild left ankle/pretibial edema and trace edema at best of RLE; right arm in sling/fingers are warm   :  no shine; using urinal  Skin:  no rashes  Neuro AAO; PONCE 5/5 except RUE (operative limb)      Lab Results:       Results from last 7 days  Lab Units 12/05/17  0557   POTASSIUM mmol/L 4 1                 Glucose (mg/dL)   Date Value   02/03/2017 94   10/26/2016 129   09/09/2016 98   01/09/2016 90       Labs oz (77.2 kg)  SpO2 98%  BMI 35.58 kg/m2  Constitutional: healthy, alert and no distress   Psychiatric: mentation appears normal and affect normal/bright  JOINT/EXTREMITIES: mildly antalgic gait.     ASSESSMENT / PLAN:  (R53.83) Other fatigue  (primary encounter diagnosis)  Comment: unclear etiology. She is doing well for her age. Will look for causes. Her PMR may be culprit but doing well for a while.   Plan: CRP, inflammation, ESR: Erythrocyte         sedimentation rate, CBC with platelets, TSH         with free T4 reflex, Vitamin D deficiency         screening, Comprehensive metabolic panel, T4         free             (E04.2) Nontoxic multinodular goiter  Comment:    Plan: thyroid can contribute too. Recheck today.     (M35.3) Polymyalgia rheumatica (H)  Comment: check inflammatory markers.   Plan: CRP, inflammation, ESR: Erythrocyte         sedimentation rate             (R73.01) Impaired fasting blood sugar  Comment:    Plan: Comprehensive metabolic panel             (M54.5,  G89.29) Chronic bilateral low back pain without sciatica  Comment:  Chronic back pain. She is travelling to other [place] and I think it is OK to keep some tramadol with her. She is not using any opiates on regular basis. We talked about safety, side effects and not most ideal option but as a back up. She understood.   Plan: traMADol (ULTRAM) 50 MG tablet             (M89.9,  M94.9) Disorder of bone and cartilage  Comment:  To check for fatigue.   Plan: Ferritin, Vitamin B12      reviewed    Imaging: reviewed  EKG, Pathology, and Other Studies: I have personally reviewed pertinent reports  VTE Prophylaxis: Sequential compression device (Venodyne)     Code Status: No Order     Assessment/Plan     1  Right rotator cuff tear; s/p reverse total shoulder arthroplasty and cyst excision (Deborah):  Pain management per primary service/Ropivacaine    2  CKD III baseline 1 6-1 7:  Last creat OP was 1 5 on 11/17/17  Will hold Losartan for now until discharged  BMP in AM    3  HTN: stable currently; at home, he takes Losartan 50mg qd and Norvasc 10mg qd; for now holding ARB since post-op  He did get Norvasc 10mg at 1116 today after back from OR  Will add Hydralazine 25mg q8hrs prn SBP >165    4  BPH w/o urinary obstruction:  Sees uro at [facility]; takes no meds for this issue    5  GERD:  Continue Protonix    6  Leg edema: seems he has had for a while and LVEF was normal (11/17/17) with negative venous Dopplers (5/2017); could be the Norvasc making it worse; he should see PCP soon to re-eval BP meds        Counseling / Coordination of Care  Total floor / unit time spent today 55 minutes  Greater than 50% of total time was spent with the patient and / or family counseling and / or coordination of care        EL Ngo

## 2017-12-05 NOTE — ANESTHESIA PROCEDURE NOTES
Peripheral Block    Patient location during procedure: holding area  Start time: 12/5/2017 7:05 AM  Reason for block: at surgeon's request and post-op pain management  Staffing  Anesthesiologist: Tena Houston  Performed: anesthesiologist   Preanesthetic Checklist  Completed: patient identified, site marked, surgical consent, pre-op evaluation, timeout performed, IV checked, risks and benefits discussed and monitors and equipment checked  Peripheral Block  Patient position: supine  Prep: ChloraPrep  Patient monitoring: continuous pulse ox and frequent blood pressure checks  Block type: interscalene  Laterality: right  Injection technique: catheter  Procedures: ultrasound guided  ultrasound permanent image saved    Local infiltration: lidocaine  Infiltration strength: 1 %  Dose: 3 mL  Needle  Needle type: Stimuplex   Needle gauge: 22 G  Needle length: 4    Needle localization: ultrasound guidance  Needle insertion depth: 2 cm  Catheter type: open end  Catheter size: 18 G  Catheter at skin depth: 5 cm  Assessment  Injection assessment: incremental injection, local visualized surrounding nerve on ultrasound, negative aspiration for CSF, negative aspiration for heme and no paresthesia on injection  Paresthesia pain: none  Heart rate change: no  Slow fractionated injection: yes  Post-procedure:  sterile dressing applied  patient tolerated the procedure well with no immediate complications

## 2017-12-05 NOTE — H&P
I identified and marked the patient in the pre-op holding area after confirming the surgical consent  No changes to medical health since the H&P was preformed

## 2017-12-05 NOTE — ANESTHESIA PREPROCEDURE EVALUATION
Review of Systems/Medical History  Patient summary reviewed  Chart reviewed      Cardiovascular  Exercise tolerance: good,  Hyperlipidemia, Hypertension controlled,    Pulmonary       GI/Hepatic    GERD ,             Endo/Other  Arthritis     GYN       Hematology   Musculoskeletal       Neurology   Psychology           Physical Exam    Airway    Mallampati score: II  TM Distance: >3 FB  Neck ROM: full     Dental       Cardiovascular      Pulmonary      Other Findings        Anesthesia Plan  ASA Score- 2       Anesthesia Type- general and regional with ASA Monitors  Additional Monitors:   Airway Plan: ETT  Comment: Patient seen and examined  History reviewed  Patient to be done under general anesthesia with ETT and routine monitors  IS block with catheter placement performed preoperatively for post-op pain  Risks discussed with the patient  Consent obtained          Induction- intravenous  Informed Consent- Anesthetic plan and risks discussed with patient  I personally reviewed this patient with the CRNA  Discussed and agreed on the Anesthesia Plan with the CRNA  Paolo Moreno

## 2017-12-05 NOTE — ANESTHESIA POSTPROCEDURE EVALUATION
Post-Op Assessment Note      CV Status:  Stable    Mental Status:  Alert and awake    Hydration Status:  Euvolemic    PONV Controlled:  Controlled    Airway Patency:  Patent    Post Op Vitals Reviewed: Yes          Staff: Anesthesiologist, CRNA     Post-op block assessment: no complications        BP (P) 138/81 (12/05/17 0940)    Temp (P) 97 8 °F (36 6 °C) (12/05/17 0940)    Pulse (P) 86 (12/05/17 0940)   Resp (P) 12 (12/05/17 0940)    SpO2 (P) 97 % (12/05/17 0940)

## 2017-12-05 NOTE — PLAN OF CARE
DISCHARGE PLANNING     Discharge to home or other facility with appropriate resources Progressing        INFECTION - ADULT     Absence or prevention of progression during hospitalization Progressing        Knowledge Deficit     Patient/family/caregiver demonstrates understanding of disease process, treatment plan, medications, and discharge instructions Progressing        MUSCULOSKELETAL - ADULT     Maintain or return mobility to safest level of function Progressing     Maintain proper alignment of affected body part Progressing        PAIN - ADULT     Verbalizes/displays adequate comfort level or baseline comfort level Progressing        Potential for Falls     Patient will remain free of falls Progressing        SAFETY ADULT     Maintain or return to baseline ADL function Progressing     Maintain or return mobility status to optimal level Progressing        SKIN/TISSUE INTEGRITY - ADULT     Skin integrity remains intact Progressing     Incision(s), wounds(s) or drain site(s) healing without S/S of infection Progressing     Oral mucous membranes remain intact Progressing

## 2017-12-06 VITALS
WEIGHT: 187 LBS | OXYGEN SATURATION: 94 % | TEMPERATURE: 97.6 F | HEIGHT: 69 IN | HEART RATE: 91 BPM | BODY MASS INDEX: 27.7 KG/M2 | SYSTOLIC BLOOD PRESSURE: 117 MMHG | RESPIRATION RATE: 18 BRPM | DIASTOLIC BLOOD PRESSURE: 71 MMHG

## 2017-12-06 LAB
ANION GAP SERPL CALCULATED.3IONS-SCNC: 8 MMOL/L (ref 4–13)
BUN SERPL-MCNC: 35 MG/DL (ref 5–25)
CALCIUM SERPL-MCNC: 8.3 MG/DL (ref 8.3–10.1)
CHLORIDE SERPL-SCNC: 111 MMOL/L (ref 100–108)
CO2 SERPL-SCNC: 23 MMOL/L (ref 21–32)
CREAT SERPL-MCNC: 2.02 MG/DL (ref 0.6–1.3)
ERYTHROCYTE [DISTWIDTH] IN BLOOD BY AUTOMATED COUNT: 13.9 % (ref 11.6–15.1)
GFR SERPL CREATININE-BSD FRML MDRD: 31 ML/MIN/1.73SQ M
GLUCOSE SERPL-MCNC: 145 MG/DL (ref 65–140)
HCT VFR BLD AUTO: 36.4 % (ref 36.5–49.3)
HGB BLD-MCNC: 12.2 G/DL (ref 12–17)
MCH RBC QN AUTO: 29.8 PG (ref 26.8–34.3)
MCHC RBC AUTO-ENTMCNC: 33.5 G/DL (ref 31.4–37.4)
MCV RBC AUTO: 89 FL (ref 82–98)
PLATELET # BLD AUTO: 182 THOUSANDS/UL (ref 149–390)
PMV BLD AUTO: 10.3 FL (ref 8.9–12.7)
POTASSIUM SERPL-SCNC: 4.4 MMOL/L (ref 3.5–5.3)
RBC # BLD AUTO: 4.1 MILLION/UL (ref 3.88–5.62)
SODIUM SERPL-SCNC: 142 MMOL/L (ref 136–145)
WBC # BLD AUTO: 15.58 THOUSAND/UL (ref 4.31–10.16)

## 2017-12-06 PROCEDURE — 80048 BASIC METABOLIC PNL TOTAL CA: CPT | Performed by: PHYSICIAN ASSISTANT

## 2017-12-06 PROCEDURE — 97535 SELF CARE MNGMENT TRAINING: CPT

## 2017-12-06 PROCEDURE — 97163 PT EVAL HIGH COMPLEX 45 MIN: CPT

## 2017-12-06 PROCEDURE — 97166 OT EVAL MOD COMPLEX 45 MIN: CPT

## 2017-12-06 PROCEDURE — G8979 MOBILITY GOAL STATUS: HCPCS

## 2017-12-06 PROCEDURE — G8987 SELF CARE CURRENT STATUS: HCPCS

## 2017-12-06 PROCEDURE — G8978 MOBILITY CURRENT STATUS: HCPCS

## 2017-12-06 PROCEDURE — 85027 COMPLETE CBC AUTOMATED: CPT | Performed by: PHYSICIAN ASSISTANT

## 2017-12-06 PROCEDURE — G8988 SELF CARE GOAL STATUS: HCPCS

## 2017-12-06 RX ORDER — AMLODIPINE BESYLATE 10 MG/1
10 TABLET ORAL DAILY
Status: DISCONTINUED | OUTPATIENT
Start: 2017-12-07 | End: 2017-12-06 | Stop reason: HOSPADM

## 2017-12-06 RX ADMIN — CEFAZOLIN SODIUM 2000 MG: 2 SOLUTION INTRAVENOUS at 00:27

## 2017-12-06 RX ADMIN — PANTOPRAZOLE SODIUM 40 MG: 40 TABLET, DELAYED RELEASE ORAL at 05:44

## 2017-12-06 RX ADMIN — AMLODIPINE BESYLATE 10 MG: 10 TABLET ORAL at 09:01

## 2017-12-06 RX ADMIN — DOCUSATE SODIUM 100 MG: 100 CAPSULE, LIQUID FILLED ORAL at 09:01

## 2017-12-06 NOTE — CASE MANAGEMENT
Initial Clinical Review    Age/Sex: 78 y o  male admitted on 12/5 for elective surgery - OR    Surgery Date: 12/5    Procedure: S/P ARTHROPLASTY SHOULDER REVERSE with OPEN CYST EXCISION (Right Shoulder)    Anesthesia: General, Regional    Admission Orders: Date/Time/Statement: Inpatient 12/5/17 @ 0943 Med Surg    Orders Placed This Encounter   Procedures    Inpatient Admission     Standing Status:   Standing     Number of Occurrences:   1     Order Specific Question:   Admitting Physician     Answer:   Thee Hidalgo [[id number]]     Order Specific Question:   Level of Care     Answer:   Med Surg [16]     Order Specific Question:   Estimated length of stay     Answer:   More than 2 Midnights     Order Specific Question:   Certification     Answer:   I certify that inpatient services are medically necessary for this patient for a duration of greater than two midnights  See H&P and MD Progress Notes for additional information about the patient's course of treatment  Vital Signs: /71   Pulse 91   Temp 97 6 °F (36 4 °C) (Oral)   Resp 18   Ht 5' 9" (1 753 m)   Wt 84 8 kg (187 lb)   SpO2 94%   BMI 27 62 kg/m²     Diet:        Diet Orders            Start     Ordered    12/05/17 1052  Diet Cardiac; Cardiac Step 1  Diet effective now     Question Answer Comment   Diet Type Cardiac    Cardiac Cardiac Step 1    RD to adjust diet per protocol?  Yes        12/05/17 1052          Mobility: OOB  PT/OT eval and treat    DVT Prophylaxis: Sequential compression device    Scheduled Meds:  Cefazolin  Intravenous x3   amLODIPine 10 mg Oral Daily   docusate sodium 100 mg Oral BID   pantoprazole 40 mg Oral Early Morning   tamsulosin 0 4 mg Oral Daily With Dinner     Continuous Infusions:  lactated ringers 100 mL/hr Last Rate: 100 mL/hr (12/05/17 2005)   ropivacaine (Ambu ACTion BLOCK PUMP) continuous peripheral nerve block 8 mL/hr Last Rate: 8 mL/hr (12/05/17 1000)     PRN Meds:     acetaminophen    calcium carbonate   hydrALAZINE    HYDROmorphone    ondansetron    oxyCODONE po x1

## 2017-12-06 NOTE — CONSULTS
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]     * Other specified joint disorders, unspecified shoulder [M25 819]    Assessment:   78 y o  male STATUS POST   Procedure(s):  ARTHROPLASTY SHOULDER REVERSE with OPEN CYST EXCISION  POD# 1    Plan:   - Pt reports excellent pain control from IS catheter  Edges of tegaderm re-inforced with tape  - Pt states understanding of instructions for catheter care and discontinuation at home  - No changes at this time  APS will follow as long as pt still admitted  Please call  ( [facility] [phone]) with any questions      Meds/Allergies   all current active meds have been reviewed    No Known Allergies    Objective     Temp:  [97 5 °F (36 4 °C)-98 5 °F (36 9 °C)] 97 6 °F (36 4 °C)  HR:  [84-94] 91  Resp:  [17-20] 18  BP: (117-136)/(62-81) 117/71    Physical Exam:  Gen: NAD  CV: Reg rate  Pulm: Nonlabored  Neuro: A&Ox3, CNs II-XII grossly intact (XI deferred), moves all extremities  IS catheter Site: C/D/I    SIGNATURE: Deepika Sosa MD  DATE: December 6, 2017  TIME: 12:11 PM

## 2017-12-06 NOTE — DISCHARGE SUMMARY
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]  Other specified joint disorders, unspecified shoulder [M25 819]    Date of admission: 12/5/2017    Date of discharge: 12/06/17         Procedure: R rTSA    HPI:  This is a 78y o  year old male that presented to the office with signs and symptoms of right Shoulder osteoarthritis and/or other pathology  They tried and failed conservative treatment measures and wished to proceed with surgical intervention  The risks, benefits, and complications of the procedure were discussed with the patient and informed consent was obtained  Hospital Course: The patient was admitted to the hospital on 12/5/2017 and underwent an uncomplicated right total shoulder arthroplasty  They were transferred to the floor after a brief stay in the post-anesthesia care unit  Their pain was well managed with IV and oral pain medications  On discharge date pt was cleared by PT and the medicine team and determined to be safe for discharge      0  Lab Value Date/Time   HGB 12 2 12/06/2017 0454   HGB 14 8 11/17/2017 0912   HGB 15 7 01/09/2016 1723         Discharge Instructions: The patient was discharged nonweight bearing to the right upper extremity  Refrain from PT/OT until cleared by Surgeon  Take pain medications as instructed  Discharge Medications: For the complete list of discharge medications, please refer to the patient's medication reconciliation

## 2017-12-06 NOTE — PLAN OF CARE
Problem: OCCUPATIONAL THERAPY ADULT  Goal: Performs self-care activities at highest level of function for planned discharge setting  See evaluation for individualized goals  Treatment Interventions: ADL retraining, Patient/family training, Compensatory technique education, Continued evaluation, Energy conservation, Activityengagement          See flowsheet documentation for full assessment, interventions and recommendations  Limitation: Decreased ADL status, Decreased UE ROM, Decreased UE strength, Decreased endurance, Decreased self-care trans, Decreased high-level ADLs  Prognosis: Good  Assessment: Pt is a 79 y/o male seen for OT eval s/p adm to [facility] for Right Shoulder with Reverse Total Shoulder Arthroplasty; AC Joint/[place] Ganglion Cyst Excision performed on 12/5/2017  Pt with active OT orders and NWB R UE orders in an abduction sling  Pt lives with his wife in a single floor apartment w/ 0 [address]  Pt was I w/ ADLS and IADLS, drove, & required no use of DME PTA  Pt is currently demonstrating the following occupational deficits: mod A UB ADLS, mod A LB ADLS, S functional transfers and S functional mobility without AD  Pt with deficits and limitations in all baseline areas of occupation 2* pain, decreased endurance/activity tolerance, decreased functional forward reach, decreased functional use of R UE, orthopedic restrictions, NWB status, and decreased ADL status  The following Occupational Performance Areas to address include: eating, grooming, bathing/shower, toilet hygiene, dressing, functional mobility, community mobility, clothing management, meal prep and household maintenance  Pt scored overall 65/100 on the Barthel Index  Based on the aforementioned OT evaluation, functional performance deficits, and assessments, pt has been identified as a moderate complexity evaluation  Anticipate pt will be able to return home with increased family support pending progress   Pt to continue to benefit from acute immediate OT services to address the following goals 3-5x/wk to  w/in 7-10 days:     OT Discharge Recommendation: Home with family support      Cori Menard MS, OTR/L

## 2017-12-06 NOTE — SOCIAL WORK
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/[facility], denies rehab  Pts pharmacy is [facility] on [address] in [place]  No POA  Pt denies hx/admission for drug/etoh and psych/mental health  CM reviewed d/c planning process including the following: identifying help at home, patient preference for d/c planning needs, [facility]charge Lounge, [facility], availability of treatment team to discuss questions or concerns patient and/or family may have regarding understanding medications and recognizing signs and symptoms once discharged  CM also encouraged patient to follow up with all recommended appointments after discharge  Patient advised of importance for patient and family to participate in managing patients medical well being

## 2017-12-06 NOTE — DISCHARGE INSTRUCTIONS
Reason for admission: Right shoulder arthritis  Diagnosis: Right shoulder rotator cuff arthritis  Procedure: Right reverse total shoulder arthroplasty    Orthopedic Instructions:   1) Keep Incision clean and dry  2) you may shower on post-operative day #4 to clean the incision with regular soap and water  Pat Dry  Apply new clean bandage  If at any point, the dressing becomes saturated, please remove and apply new bandage  3) you must wear sling at all times, except for personal hygiene, physical therapy, elbow/wrist/hand range of motion  4) no bearing weight on the operative arm - including using the arm to push out of bed, car, chair, couch, bath, etc  5) You may take Tylenol as needed for your pain (please refer to the bottle for instructions); for severe pain, you may take 1-2 tablets of Oxycodone every 4-6 hours if needed    6) Hold losartan until you see your Primary Care doctor    Surgical Follow up:  Dr Christoph Capellan  If you have any questions about your appointment date and time, please call office at [phone]

## 2017-12-06 NOTE — OCCUPATIONAL THERAPY NOTE
[address] Evaluation     Patient Name: Scot Sanchez Date: 12/6/2017  Problem List  Patient Active Problem List   Diagnosis    Rotator cuff tear arthropathy, right    Secondary osteoarthritis of right shoulder     Past Medical History  Past Medical History:   Diagnosis Date    Arthritis     BPH without urinary obstruction     CKD (chronic kidney disease), stage III     baseline 1 6-1 7    GERD (gastroesophageal reflux disease)     Hyperlipidemia     Hypertension      Past Surgical History  Past Surgical History:   Procedure Laterality Date    APPENDECTOMY             12/06/17 0912   Note Type   Note type Eval/Treat   Restrictions/Precautions   Weight Bearing Precautions Per Order Yes   RUE Weight Bearing Per Order NWB   Braces or Orthoses Sling  (abduction sling )   Other Precautions WBS; Multiple lines;Telemetry; Fall Risk   Pain Assessment   Pain Assessment No/denies pain   Pain Score No Pain   Home Living   Type of Home Apartment   Home Layout One level   Bathroom Shower/Tub Walk-in shower   Bathroom Toilet Raised   Bathroom Equipment Built-in shower seat  (handicapped bathroom )   Bathroom Accessibility Accessible   Additional Comments Pt lives with his wife in a single floor apartment w/ 0 CHRISTOPHER   Prior Function   Level of [place] Independent with ADLs and functional mobility   Lives With Josefina Help From Family   ADL Assistance Independent   IADLs Independent   Falls in the last 6 months 0   Vocational Retired   Comments Pt was I w/ ADLS and IADLS, drove, & required no use of DME PTA   Lifestyle   Autonomy Pt was I w/ ADLS/IADLS PTA + driving   Reciprocal Relationships Pt lives with his wife and he has a local supportive daughter   Pt reports wife and daughter will provide assistance upon D/C    Service to Others Pt is retired   Intrinsic Gratification Pt reports he enjoys reading the bible    Psychosocial   Psychosocial (WDL) WDL   Subjective   Subjective "I feel comfortable with all of it"   ADL   Eating Assistance 5  Supervision/Setup   Grooming Assistance 4  Minimal Assistance   UB Bathing Assistance 4  Minimal Assistance   LB Bathing Assistance 3  Moderate Assistance   UB Dressing Assistance 3  Moderate Assistance   LB Dressing Assistance 3  Moderate [address]  4  Minimal Assistance   Bed Mobility   Supine to Sit 5  Supervision   Sit to Supine 5  Supervision   Transfers   Sit to Stand 5  Supervision   Stand to Sit 5  Supervision   Functional Mobility   Functional Mobility 5  Supervision   Additional Comments without AD   Balance   Static Sitting Normal   Dynamic Sitting Fair   Static Standing Fair +   Dynamic Standing Fair   Ambulatory Fair   Activity Tolerance   Activity Tolerance Patient tolerated treatment well   Medical Staff Made Aware PT Nancy Sosa   Nurse Made Aware RN confirm pt appropriate for OT Eval    RUE Assessment   RUE Assessment X  (NWB in abduction sling )   LUE Assessment   LUE Assessment WFL   Hand Function   Gross Motor Coordination Impaired   Fine Motor Coordination Functional   Cognition   Overall Cognitive Status WFL   Arousal/Participation Alert; Responsive; Cooperative   Attention Within functional limits   Memory Within functional limits   Following Commands Follows one step commands without difficulty   Assessment   Limitation Decreased ADL status; Decreased UE ROM; Decreased UE strength;Decreased endurance;Decreased self-care trans;Decreased high-level ADLs   Prognosis Good   Assessment Pt is a 77 y/o male seen for OT eval s/p adm to [facility] for Right Shoulder with Reverse Total Shoulder Arthroplasty; AC Joint/[place] Ganglion Cyst Excision performed on 12/5/2017  Pt with active OT orders and NWB R UE orders in an abduction sling  Pt lives with his wife in a single floor apartment w/ 0 [address]RISTOPHER  Pt was I w/ ADLS and IADLS, drove, & required no use of DME PTA   Pt is currently demonstrating the following occupational deficits: mod A UB ADLS, mod A LB ADLS, S functional transfers and S functional mobility without AD  Pt with deficits and limitations in all baseline areas of occupation 2* pain, decreased endurance/activity tolerance, decreased functional forward reach, decreased functional use of R UE, orthopedic restrictions, NWB status, and decreased ADL status  The following Occupational Performance Areas to address include: eating, grooming, bathing/shower, toilet hygiene, dressing, functional mobility, community mobility, clothing management, meal prep and household maintenance  Pt scored overall 65/100 on the Barthel Index  Based on the aforementioned OT evaluation, functional performance deficits, and assessments, pt has been identified as a moderate complexity evaluation  Anticipate pt will be able to return home with increased family support pending progress  Pt to continue to benefit from acute immediate OT services to address the following goals 3-5x/wk to  w/in 7-10 days:   Goals   Patient Goals to go home today   Plan   Treatment Interventions ADL retraining;Patient/family training; Compensatory technique education;Continued evaluation; Energy conservation; Activityengagement   Goal Expiration Date 17   OT Frequency 3-5x/wk   Additional Treatment Session   Start Time    End Time 912   Treatment Assessment Pt participated in additional OT tx session focusing on education of precautions, education of abduction sling, don/doffing education and practice of abduction sling and education on dressing/bathing  Educated pt on NWB status and importance of keeping his R UE in the sling  Educated pt on how to don/doff the sling and practiced with pt  No family was present during tx session, however pt reports his daughter was an RN and he was educated on this pre-op and feels comfortable explaining to his wife and daughter how to assist him w/ the sling  Post skilled education, pt able to verbalize how to don/doff abduction sling   Pt educated on how to dress with front open shirts while maintaining precautions  Pt reports he only wears front open shirts and verbalizes understanding regarding dressing/bathing adaptations/techniques   Pt reports "I understand all of what you are saying and showing me and I feel comfortable with it "    Additional Treatment Day 1   Recommendation   OT Discharge Recommendation Home with family support   Barthel Index   Feeding 5   Bathing 0   Grooming Score 0   Dressing Score 5   Bladder Score 10   Bowels Score 10   Toilet Use Score 10   Transfers (Bed/Chair) Score 15   Mobility (Level Surface) Score 10   Stairs Score 0   Barthel Index Score 65   Modified [place] Scale   Modified [place] Scale 3       GOALS:    1) Pt will improve activity tolerance to G for min 30 min txment sessions  2) Pt will complete ADLs/self care w/ min A using adaptive equipment and DME as needed w/ G hyiene/thoroughness w/ min cues fro cog support  3) Pt will complete toileting w/ mod I w/ G hygiene/thoroughness using DME as needed  4) Pt will improve functional transfers on/off all surfaces using DME as needed w/ G balance/safety including toileting w/ mod I  5) Pt will improve functional mobility during ADL/IADL/leisure tasks using DME as needed w/ g balance/safety w/ mod I  6) Pt will demonstrate G carryover of pt/caregiver education and training as appropriate w/ mod I w/o cues w/ G tolerance  7) Pt will demonstrate 100% carryover of learned E C  techniques s/p skilled education w/o cues t/o functional ADL/ IADL/leisure interest tasks w/ mod I  8) Pt will demonstrate 100% carryover of precautions s/p review w/o cues w/ mod I w/ G tolerance/participation t/o functional ADL/IADL/leisure tasks      Guillermo Mcgowan MS, OTR/L

## 2017-12-06 NOTE — PROGRESS NOTES
Orthopedics   [facility] 78 y o  male MRN: [id number]  Unit/Bed#: CW3 345-01      Subjective:  78 y  o male post operative day 1 right reverse total shoulder arthroplasty  Patient doing well  Pain controlled  No numbness or tingling this morning as he says numbness dissipated overnight       Labs:    0  Lab Value Date/Time   HCT 36 4 (L) 12/06/2017 0454   HCT 43 5 11/17/2017 0912   HCT 47 4 01/09/2016 1723   HGB 12 2 12/06/2017 0454   HGB 14 8 11/17/2017 0912   HGB 15 7 01/09/2016 1723   WBC 15 58 (H) 12/06/2017 0454   WBC 6 49 11/17/2017 0912   WBC 7 82 01/09/2016 1723       Meds:    Current Facility-Administered Medications:     acetaminophen (TYLENOL) tablet 650 mg, 650 mg, Oral, Q6H PRN, [place] Faster, PA-C    amLODIPine (NORVASC) tablet 10 mg, 10 mg, Oral, Daily, [place] Faster, PA-C, 10 mg at 12/05/17 1116    calcium carbonate (TUMS) chewable tablet 1,000 mg, 1,000 mg, Oral, Daily PRN, [place] Faster, PA-C    docusate sodium (COLACE) capsule 100 mg, 100 mg, Oral, BID, [place] Faster, PA-C, 100 mg at 12/05/17 1742    hydrALAZINE (APRESOLINE) tablet 25 mg, 25 mg, Oral, Q8H PRN, EL Frye    HYDROmorphone (DILAUDID) 1 mg/mL injection 0 5 mg, 0 5 mg, Intravenous, Q2H PRN, [place] Faster, PA-C    lactated ringers infusion, 100 mL/hr, Intravenous, Continuous, [place] Faster, PA-C, Last Rate: 100 mL/hr at 12/05/17 2005, 100 mL/hr at 12/05/17 2005    ondansetron (ZOFRAN) injection 4 mg, 4 mg, Intravenous, Q6H PRN, [place] Faster, PA-C    oxyCODONE (ROXICODONE) immediate release tablet 10 mg, 10 mg, Oral, Q4H PRN, [place] Faster, PA-C, 10 mg at 12/05/17 2312    oxyCODONE (ROXICODONE) IR tablet 5 mg, 5 mg, Oral, Q4H PRN, [place] Faster, PA-C    pantoprazole (PROTONIX) EC tablet 40 mg, 40 mg, Oral, Early Morning, [place] Faster, PA-C, 40 mg at 12/06/17 0544    ropivacaine (NAROPIN) 0 2 % 600 mL in elastomeric reservoir (AMBU ACTION BLOCK PUMP) continuous peripheral nerve block, 8 mL/hr, Subcutaneous, Continuous, Nadia Kohler MD, Last Rate: 8 mL/hr at 12/05/17 1000, 8 mL/hr at 12/05/17 1000    tamsulosin (FLOMAX) capsule 0 4 mg, 0 4 mg, Oral, Daily With Sonia Good PA-C, 0 4 mg at 12/05/17 1742    Blood Culture:   No results found for: BLOODCX    Wound Culture:   No results found for: WOUNDCULT    Ins and Outs:  I/O last 24 hours: In: 3251 7 [P O :660; I V :1991 7; Epidural:600]  Out: 1300 [Urine:1100; Blood:200]          Physical:  Vitals:    12/06/17 0232   BP: 120/62   Pulse: 94   Resp: 17   Temp: 98 °F (36 7 °C)   SpO2: 91%     right upper extremity  · Dressings clean dry intact  · Sensation intact to axillary, musculocutaneous, radial, ulna, median nerves  · Motor intact to axillary, musculocutaneous, radial, ulna, median nerves  · 2+ Radial pulse    _*_*_*_*_*_*_*_*_*_*_*_*_*_*_*_*_*_*_*_*_*_*_*_*_*_*_*_*_*_*_*_*_*_*_*_*_*_*_*_*_*    Assessment: 78 y  o male post operative day 1 right reverse total shoulder arthroplasty   Doing well    Plan:  · Nonweight Bearing right upper extremity  · Up and out of bed  · Sling for Comfort, may remove for pendulums and hygiene  · DVT prophylaxis  · Ice and analgesics  · Dispo: DC likely today   · Patient noted to have acute blood loss anemia due to a drop in Hbg of > 2 0g from preop levels, will monitor vital signs and resuscitate with IV fluids as needed      Wallace Atkinson MD

## 2017-12-06 NOTE — PROGRESS NOTES
Internal Medicine Progress Note  Patient: Elin Sharpe  Age/sex: 78 y o  male  Medical Record #: [id number]      ASSESSMENT/PLAN:  Andrew Sharpe is seen and examined and mangement for following issues:    1  Right rotator cuff tear; s/p reverse total shoulder arthroplasty and cyst excision (Deborah):  Pain management per primary service/Ropivacaine     2  CKD III baseline 1 6-1 7:  Last creat OP was 1 5 on 11/17/17  Will continue to hold losartan  Creatinine at 2 today  Encourage fluids and follow up bmp on Friday with results to PCP and nephrologist      3  HTN: stable currently; at home, he takes Losartan 50mg qd and Norvasc 10mg qd; for now holding ARB since post-op  He did get Norvasc 10mg this AM  Hold for sbp < 140  Hold losartan until PCP informs to restart after review of BMP     4  BPH w/o urinary obstruction:  Se[facility]ro at [facility]; takes no meds for this issue     5  GERD:  Continue Protonix     6   Leg edema: seems he has had for a while and LVEF was normal (11/17/17) with negative venous Dopplers (5/2017); could be the Norvasc making it worse; he should see PCP soon to re-eval BP meds            Subjective: Patient seen and examined  Pain controlled well  Urinating well  No N/V   Discussed intructions following d/c regarding bp meds and lab work     ROS:   GI: denies abdominal pain, change bowel habits or reflux symptoms  Neuro: No new neurologic changes  Respiratory: No Cough, SOB  Cardiovascular: No CP, palpitations     Scheduled Meds:    [START ON 12/7/2017] amLODIPine 10 mg Oral Daily   docusate sodium 100 mg Oral BID   pantoprazole 40 mg Oral Early Morning   tamsulosin 0 4 mg Oral Daily With Dinner       Labs:       Results from last 7 days  Lab Units 12/06/17  0454   WBC Thousand/uL 15 58*   HEMOGLOBIN g/dL 12 2   HEMATOCRIT % 36 4*   PLATELETS Thousands/uL 182       Results from last 7 days  Lab Units 12/06/17  0454 12/05/17  0557   SODIUM mmol/L 142  --    POTASSIUM mmol/L 4 4 4 1   CHLORIDE mmol/L 111*  --    CO2 mmol/L 23  --    BUN mg/dL 35*  --    CREATININE mg/dL 2 02*  --    GLUCOSE RANDOM mg/dL 145*  --    CALCIUM mg/dL 8 3  --                 Glucose (mg/dL)   Date Value   12/06/2017 145 (H)   02/03/2017 94   10/26/2016 129   09/09/2016 98       Labs reviewed    Physical Examination:  Vitals:   Vitals:    12/05/17 2305 12/06/17 0106 12/06/17 0232 12/06/17 0700   BP:   120/62 117/71   Pulse:   94 91   Resp:   17 18   Temp:   98 °F (36 7 °C) 97 6 °F (36 4 °C)   TempSrc:   Oral Oral   SpO2: 95% 93% 91% 94%   Weight:       Height:           GEN: NAD  HEENT: NC/AT, EOMI  RESP: CTAB, no R/R/W  CV: +S1 S2, regular rate, no rubs  ABD: soft, NT, ND, normal BS   : no shine  EXT: trace LE edema   Skin: no rashes  Neuro: AAO x 3      [x ] Total time spent: 30 Mins and greater than 50% of this time was spent counseling/coordinating care        Tim Rowland PA-C  Internal Medicine

## 2017-12-06 NOTE — PHYSICAL THERAPY NOTE
PHYSICAL THERAPY EVALUATION      Patient Name: Pascual Downs Date: 12/6/2017         Patient Active Problem List   Diagnosis    Rotator cuff tear arthropathy, right    Secondary osteoarthritis of right shoulder       Past Medical History:   Diagnosis Date    Arthritis     BPH without urinary obstruction     CKD (chronic kidney disease), stage III     baseline 1 6-1 7    GERD (gastroesophageal reflux disease)     Hyperlipidemia     Hypertension        Past Surgical History:   Procedure Laterality Date    APPENDECTOMY        12/06/17 0915   Note Type   Note type Eval only   Pain Assessment   Pain Assessment 0-10   Pain Score No Pain   Home Living   Type of [address] One level  (pt reports 0 [address] building)   Home Equipment (none)   Prior Function   Level of [place] Independent with ADLs and functional mobility   Lives With Spouse   Receives Help From Family   ADL Assistance Independent   IADLs Independent   Falls in the last 6 months 0   Restrictions/Precautions   Weight Bearing Precautions Per Order Yes   RUE Weight Bearing Per Order NWB   Braces or Orthoses Sling  (R abd sling)   Other Precautions WBS; Fall Risk  (cont pulse ox, q ball interscalene block)   General   Additional Pertinent History s/p R reverse TSA   Family/Caregiver Present No   Cognition   Overall Cognitive Status WFL   Arousal/Participation Cooperative   Orientation Level Oriented X4   Memory Within functional limits   Following Commands Follows multistep commands with increased time or repetition   RUE Assessment   RUE Assessment X  (AROM NT)   RUE Strength   RUE Overall Strength Deficits;Due to precautions   LUE Assessment   LUE Assessment WFL   RLE Assessment   RLE Assessment X  (AROM WFL)   Strength RLE   RLE Overall Strength 4+/5   LLE Assessment   LLE Assessment X  (AROM WFL)   Strength LLE   LLE Overall Strength 4+/5   Coordination   Movements are Fluid and Coordinated 1   Sensation WFL   Light Touch   RLE Light Touch Grossly intact   LLE Light Touch Grossly intact   Bed Mobility   Additional Comments Pt already sitting EOB at start of visit   Transfers   Sit to Stand 5  Supervision   Additional items Assist x 1; Increased time required;Verbal cues   Stand to Sit 5  Supervision   Additional items Assist x 1; Increased time required;Verbal cues   Additional Comments reviewed R shldr pendulums with support of bedrail; handout provided   Ambulation/Elevation   Gait pattern Short stride; Excessively slow   Gait Assistance 5  Supervision   Additional items Assist x 1;Verbal cues   Assistive Device None   Distance 100 ft   Balance   Static Sitting Fair +   Dynamic Sitting Fair   Static Standing Fair   Dynamic Standing Fair -   Ambulatory Fair -   Endurance Deficit   Endurance Deficit No   Activity Tolerance   Activity Tolerance Patient tolerated treatment well   Nurse Made Aware SKINNY Mary confirmed pt appropriate for PT eval    Assessment   Prognosis Good   Problem List Decreased strength;Decreased range of motion;Decreased mobility; Decreased coordination;Orthopedic restrictions   Assessment Pt is 78 y o  male seen for PT evaluation s/p admit [facility] w/ Rotator cuff tear arthropathy, right  POD1 R reverse TSA  PT consulted to assess pt's functional mobility and d/c needs  Order placed for PT eval and tx, w/ activity as tolerated and RUE NWB order  Comorbidities affecting pt's physical performance at time of assessment include: HTN, HLD, GERD, arthritis, CKD III  PTA, pt was independent w/ all functional mobility w/ no AD  He lives in an apt with no CHRISTOPHER, one level  Personal factors affecting pt at time of IE include: preferred language not [place] (language barrier)   Please find objective findings from PT assessment regarding body systems outlined above with impairments and limitations including weakness, decreased ROM, impaired coordination, gait deviations, fall risk, orthopedic restrictions and decreased skin integrity  The following objective measures performed on IE also reveal limitations: Barthel Index: 60/100  Pt's clinical presentation is currently unstable/unpredictable seen in pt's presentation of fall risk, neurovasc checks, cont pulse ox, interscalene block for pain control, abnormal labs  Pt currently supervision level for functional mobility  Instructed pt in R shoulder pendulums; handout was provided  Pt to benefit from continued PT tx to address deficits as defined above and maximize level of functional independent mobility and consistency  From PT/mobility standpoint, recommendation at time of d/c would be home with family support and eventual OP therapy for R shoulder once cleared by surgeon for participation pending progress in order to facilitate return to PLOF  Goals   Patient Goals to go home   STG Expiration Date 12/11/17   Short Term Goal #1 In 5 days: Pt will perform bed mobility mod I  Pt will perform sit<>stand and bed<>chair transfers mod I  Pt will amb 150+ ft mod I with AD as needed  Treatment Day 0   Plan   Treatment/Interventions Functional transfer training;Elevations; Therapeutic exercise; Endurance training;Patient/family training;Equipment eval/education; Bed mobility;Gait training;OT   PT Frequency 5x/wk   Recommendation   Recommendation Home with family support  (once cleared by surgeon, OP PT or OT)   PT - OK to Discharge Yes   Barthel Index   Feeding 5   Bathing 0   Grooming Score 0   Dressing Score 5   Bladder Score 10   Bowels Score 10   Toilet Use Score 10   Transfers (Bed/Chair) Score 10   Mobility (Level Surface) Score 10   Stairs Score 0  (NT)   Barthel Index Score 60         Arlette Mckenzie PT

## 2017-12-06 NOTE — PLAN OF CARE
Problem: PHYSICAL THERAPY ADULT  Goal: Performs mobility at highest level of function for planned discharge setting  See evaluation for individualized goals  Treatment/Interventions: Functional transfer training, Elevations, Therapeutic exercise, Endurance training, Patient/family training, Equipment eval/education, Bed mobility, Gait training, OT          See flowsheet documentation for full assessment, interventions and recommendations  Prognosis: Good  Problem List: Decreased strength, Decreased range of motion, Decreased mobility, Decreased coordination, Orthopedic restrictions  Assessment: Pt is 78 y o  male seen for PT evaluation s/p admit [facility] 12/5/2017 w/ Rotator cuff tear arthropathy, right  POD1 R reverse TSA  PT consulted to assess pt's functional mobility and d/c needs  Order placed for PT eval and tx, w/ activity as tolerated and RUE NWB order  Comorbidities affecting pt's physical performance at time of assessment include: HTN, HLD, GERD, arthritis, CKD III  PTA, pt was independent w/ all functional mobility w/ no AD  He lives in an apt with no CHRISTOPHER, one level  Personal factors affecting pt at time of IE include: preferred language not [place] (language barrier)  Please find objective findings from PT assessment regarding body systems outlined above with impairments and limitations including weakness, decreased ROM, impaired coordination, gait deviations, fall risk, orthopedic restrictions and decreased skin integrity  The following objective measures performed on IE also reveal limitations: Barthel Index: 60/100  Pt's clinical presentation is currently unstable/unpredictable seen in pt's presentation of fall risk, neurovasc checks, cont pulse ox, interscalene block for pain control, abnormal labs  Pt currently supervision level for functional mobility  Instructed pt in R shoulder pendulums; handout was provided   Pt to benefit from continued PT tx to address deficits as defined above and maximize level of functional independent mobility and consistency  From PT/mobility standpoint, recommendation at time of d/c would be home with family support and eventual OP therapy for R shoulder once cleared by surgeon for participation pending progress in order to facilitate return to PLOF  Recommendation: Home with family support (once cleared by surgeon, OP PT or OT)     PT - OK to Discharge: Yes    See flowsheet documentation for full assessment

## 2017-12-08 ENCOUNTER — TRANSCRIBE ORDERS (OUTPATIENT)
Dept: LAB | Facility: HOSPITAL | Age: 77
End: 2017-12-08

## 2017-12-08 ENCOUNTER — APPOINTMENT (OUTPATIENT)
Dept: LAB | Facility: HOSPITAL | Age: 77
End: 2017-12-08
Payer: COMMERCIAL

## 2017-12-08 DIAGNOSIS — N17.9 ACUTE RENAL FAILURE, UNSPECIFIED ACUTE RENAL FAILURE TYPE (HCC): Primary | ICD-10-CM

## 2017-12-08 DIAGNOSIS — N17.9 ACUTE RENAL FAILURE, UNSPECIFIED ACUTE RENAL FAILURE TYPE (HCC): ICD-10-CM

## 2017-12-08 LAB
ANION GAP SERPL CALCULATED.3IONS-SCNC: 5 MMOL/L (ref 4–13)
BUN SERPL-MCNC: 31 MG/DL (ref 5–25)
CALCIUM SERPL-MCNC: 8.5 MG/DL (ref 8.3–10.1)
CHLORIDE SERPL-SCNC: 110 MMOL/L (ref 100–108)
CO2 SERPL-SCNC: 28 MMOL/L (ref 21–32)
CREAT SERPL-MCNC: 1.62 MG/DL (ref 0.6–1.3)
GFR SERPL CREATININE-BSD FRML MDRD: 41 ML/MIN/1.73SQ M
GLUCOSE P FAST SERPL-MCNC: 96 MG/DL (ref 65–99)
POTASSIUM SERPL-SCNC: 3.9 MMOL/L (ref 3.5–5.3)
SODIUM SERPL-SCNC: 143 MMOL/L (ref 136–145)

## 2017-12-08 PROCEDURE — 36415 COLL VENOUS BLD VENIPUNCTURE: CPT

## 2017-12-08 PROCEDURE — 80048 BASIC METABOLIC PNL TOTAL CA: CPT

## 2017-12-11 ENCOUNTER — APPOINTMENT (OUTPATIENT)
Dept: PHYSICAL THERAPY | Age: 77
End: 2017-12-11
Payer: COMMERCIAL

## 2017-12-11 ENCOUNTER — GENERIC CONVERSION - ENCOUNTER (OUTPATIENT)
Dept: OBGYN CLINIC | Facility: OTHER | Age: 77
End: 2017-12-11

## 2017-12-11 PROCEDURE — 97110 THERAPEUTIC EXERCISES: CPT

## 2017-12-11 PROCEDURE — G8985 CARRY GOAL STATUS: HCPCS

## 2017-12-11 PROCEDURE — 97164 PT RE-EVAL EST PLAN CARE: CPT

## 2017-12-11 PROCEDURE — 97010 HOT OR COLD PACKS THERAPY: CPT

## 2017-12-11 PROCEDURE — G8984 CARRY CURRENT STATUS: HCPCS

## 2017-12-12 ENCOUNTER — GENERIC CONVERSION - ENCOUNTER (OUTPATIENT)
Dept: OBGYN CLINIC | Facility: OTHER | Age: 77
End: 2017-12-12

## 2017-12-15 ENCOUNTER — APPOINTMENT (OUTPATIENT)
Dept: PHYSICAL THERAPY | Age: 77
End: 2017-12-15
Payer: COMMERCIAL

## 2017-12-15 PROCEDURE — 97140 MANUAL THERAPY 1/> REGIONS: CPT

## 2017-12-15 PROCEDURE — 97010 HOT OR COLD PACKS THERAPY: CPT

## 2017-12-15 PROCEDURE — 97110 THERAPEUTIC EXERCISES: CPT

## 2017-12-18 ENCOUNTER — HOSPITAL ENCOUNTER (OUTPATIENT)
Dept: RADIOLOGY | Facility: HOSPITAL | Age: 77
Discharge: HOME | End: 2017-12-18
Payer: COMMERCIAL

## 2017-12-18 DIAGNOSIS — M19.211 SECONDARY OSTEOARTHRITIS OF RIGHT SHOULDER: ICD-10-CM

## 2017-12-18 DIAGNOSIS — R94.6 ABNORMAL RESULTS OF THYROID FUNCTION STUDIES: ICD-10-CM

## 2017-12-18 DIAGNOSIS — I10 ESSENTIAL (PRIMARY) HYPERTENSION: ICD-10-CM

## 2017-12-18 PROCEDURE — 73030 X-RAY EXAM OF SHOULDER: CPT

## 2017-12-19 ENCOUNTER — APPOINTMENT (OUTPATIENT)
Dept: PHYSICAL THERAPY | Age: 77
End: 2017-12-19
Payer: COMMERCIAL

## 2017-12-19 PROCEDURE — 97110 THERAPEUTIC EXERCISES: CPT

## 2017-12-19 PROCEDURE — 97140 MANUAL THERAPY 1/> REGIONS: CPT

## 2017-12-20 ENCOUNTER — APPOINTMENT (OUTPATIENT)
Dept: PHYSICAL THERAPY | Age: 77
End: 2017-12-20
Payer: COMMERCIAL

## 2017-12-20 PROCEDURE — 97140 MANUAL THERAPY 1/> REGIONS: CPT

## 2017-12-20 PROCEDURE — 97110 THERAPEUTIC EXERCISES: CPT

## 2017-12-22 ENCOUNTER — APPOINTMENT (OUTPATIENT)
Dept: PHYSICAL THERAPY | Age: 77
End: 2017-12-22
Payer: COMMERCIAL

## 2017-12-22 PROCEDURE — 97140 MANUAL THERAPY 1/> REGIONS: CPT

## 2017-12-22 PROCEDURE — 97110 THERAPEUTIC EXERCISES: CPT

## 2017-12-26 ENCOUNTER — APPOINTMENT (OUTPATIENT)
Dept: PHYSICAL THERAPY | Age: 77
End: 2017-12-26
Payer: COMMERCIAL

## 2017-12-26 PROCEDURE — 97140 MANUAL THERAPY 1/> REGIONS: CPT

## 2017-12-26 PROCEDURE — 97110 THERAPEUTIC EXERCISES: CPT

## 2017-12-28 ENCOUNTER — GENERIC CONVERSION - ENCOUNTER (OUTPATIENT)
Dept: OTHER | Facility: OTHER | Age: 77
End: 2017-12-28

## 2017-12-28 ENCOUNTER — GENERIC CONVERSION - ENCOUNTER (OUTPATIENT)
Dept: OBGYN CLINIC | Facility: OTHER | Age: 77
End: 2017-12-28

## 2017-12-28 ENCOUNTER — APPOINTMENT (OUTPATIENT)
Dept: PHYSICAL THERAPY | Age: 77
End: 2017-12-28
Payer: COMMERCIAL

## 2017-12-28 PROCEDURE — 97010 HOT OR COLD PACKS THERAPY: CPT

## 2017-12-28 PROCEDURE — G8985 CARRY GOAL STATUS: HCPCS

## 2017-12-28 PROCEDURE — 97110 THERAPEUTIC EXERCISES: CPT

## 2017-12-28 PROCEDURE — G8984 CARRY CURRENT STATUS: HCPCS | Performed by: PHYSICAL THERAPIST

## 2017-12-28 PROCEDURE — 97140 MANUAL THERAPY 1/> REGIONS: CPT

## 2017-12-29 ENCOUNTER — HOSPITAL ENCOUNTER (EMERGENCY)
Facility: HOSPITAL | Age: 77
Discharge: HOME | End: 2017-12-29
Attending: EMERGENCY MEDICINE
Payer: COMMERCIAL

## 2017-12-29 VITALS
RESPIRATION RATE: 18 BRPM | TEMPERATURE: 98.1 F | HEART RATE: 71 BPM | SYSTOLIC BLOOD PRESSURE: 185 MMHG | OXYGEN SATURATION: 98 % | DIASTOLIC BLOOD PRESSURE: 108 MMHG | BODY MASS INDEX: 27.17 KG/M2 | WEIGHT: 184 LBS

## 2017-12-29 DIAGNOSIS — Z91.19 NONCOMPLIANCE: ICD-10-CM

## 2017-12-29 DIAGNOSIS — I10 HYPERTENSION: Primary | ICD-10-CM

## 2017-12-29 LAB
ANION GAP BLD CALC-SCNC: 17 MMOL/L (ref 4–13)
BUN BLD-MCNC: 23 MG/DL (ref 5–25)
CA-I BLD-SCNC: 1.22 MMOL/L (ref 1.12–1.32)
CHLORIDE BLD-SCNC: 107 MMOL/L (ref 100–108)
CREAT BLD-MCNC: 1.6 MG/DL (ref 0.6–1.3)
GFR SERPL CREATININE-BSD FRML MDRD: 42 ML/MIN/1.73SQ M
GLUCOSE SERPL-MCNC: 114 MG/DL (ref 65–140)
HCT VFR BLD CALC: 39 % (ref 36.5–49.3)
HGB BLDA-MCNC: 13.3 G/DL (ref 12–17)
PCO2 BLD: 27 MMOL/L (ref 21–32)
POTASSIUM BLD-SCNC: 4.2 MMOL/L (ref 3.5–5.3)
SODIUM BLD-SCNC: 146 MMOL/L (ref 136–145)
SPECIMEN SOURCE: ABNORMAL

## 2017-12-29 PROCEDURE — 80047 BASIC METABLC PNL IONIZED CA: CPT

## 2017-12-29 PROCEDURE — 99283 EMERGENCY DEPT VISIT LOW MDM: CPT

## 2017-12-29 PROCEDURE — 85014 HEMATOCRIT: CPT

## 2017-12-29 RX ORDER — LOSARTAN POTASSIUM 50 MG/1
50 TABLET ORAL ONCE
Status: COMPLETED | OUTPATIENT
Start: 2017-12-29 | End: 2017-12-29

## 2017-12-29 RX ORDER — LOSARTAN POTASSIUM 50 MG/1
25 TABLET ORAL DAILY
Qty: 30 TABLET | Refills: 0 | Status: SHIPPED | OUTPATIENT
Start: 2017-12-29 | End: 2018-02-23 | Stop reason: SDUPTHER

## 2017-12-29 RX ORDER — AMLODIPINE BESYLATE 5 MG/1
10 TABLET ORAL ONCE
Status: COMPLETED | OUTPATIENT
Start: 2017-12-29 | End: 2017-12-29

## 2017-12-29 RX ADMIN — AMLODIPINE BESYLATE 10 MG: 5 TABLET ORAL at 19:22

## 2017-12-29 RX ADMIN — LOSARTAN POTASSIUM 50 MG: 50 TABLET, FILM COATED ORAL at 20:38

## 2017-12-30 NOTE — ED PROVIDER NOTES
History  Chief Complaint   Patient presents with    Hypertension     Pt was taken off HTN meds Dec 5, pt reports pressure of 288/134 last night  Reports "tingling" sensations in body  Denies headaches/vision problems  Reports shortness of breath  Pt had surgery dec 5  Was told " Patient's creatinine slightly elevated at 2 as above a top end of is normal   However patient hemodynamically stable making good urine output I feel he could be discharged home would ask him to hold his angiotensin receptor blocker again today encourage IV fluid intake follow up with PCP with BMP upon discharge "   Pt stopped all bp meds at that time never restarted them never called pcp called nephrology to set up appt will not see until January  Hypertension   Severity:  Moderate  Onset quality:  Gradual  Duration:  3 weeks  Timing:  Constant  Progression:  Worsening  Chronicity:  Chronic  Context: medication change    Ineffective treatments:  None tried  Associated symptoms: no abdominal pain, no chest pain, no confusion, no dizziness, no fever, no headaches, no hematuria and no weakness    Risk factors: kidney disease    Risk factors: no alcohol use and no tobacco use        Prior to Admission Medications   Prescriptions Last Dose Informant Patient Reported? Taking? amLODIPine (NORVASC) 10 mg tablet   Yes No   Sig: Take 10 mg by mouth daily   omeprazole (PriLOSEC) 20 mg delayed release capsule   Yes No   Sig: Take 20 mg by mouth daily   oxyCODONE (ROXICODONE) 5 mg immediate release tablet   No No   Si tablets every 4 hours as needed for pain     tamsulosin (FLOMAX) 0 4 mg   Yes No   Sig: Take 0 4 mg by mouth daily with dinner      Facility-Administered Medications: None       Past Medical History:   Diagnosis Date    Arthritis     BPH without urinary obstruction     CKD (chronic kidney disease), stage III     baseline 1 6-1 7    GERD (gastroesophageal reflux disease)     Hyperlipidemia     Hypertension Past Surgical History:   Procedure Laterality Date    APPENDECTOMY      FRACTURE SURGERY      SD RECONSTR TOTAL SHOULDER IMPLANT Right 12/5/2017    Procedure: ARTHROPLASTY SHOULDER REVERSE with OPEN CYST EXCISION;  Surgeon: Gallito Monk MD;  Location: [facility];  Service: Orthopedics       Family History   Problem Relation Age of Onset    No Known Problems Mother     No Known Problems Father      I have reviewed and agree with the history as documented  Social History   Substance Use Topics    Smoking status: Former Smoker     Packs/day: 1 00     Quit date: 1980    Smokeless tobacco: Never Used    Alcohol use No        Review of Systems   Constitutional: Negative for activity change, appetite change and fever  HENT: Negative for congestion and facial swelling  Eyes: Negative for discharge and redness  Respiratory: Negative for cough and wheezing  Cardiovascular: Negative for chest pain and leg swelling  Gastrointestinal: Negative for abdominal distention, abdominal pain and blood in stool  Endocrine: Negative for cold intolerance and polydipsia  Genitourinary: Negative for difficulty urinating and hematuria  Musculoskeletal: Negative for arthralgias and gait problem  Skin: Negative for color change and rash  Allergic/Immunologic: Negative for food allergies and immunocompromised state  Neurological: Negative for dizziness, seizures, weakness and headaches  Hematological: Negative for adenopathy  Does not bruise/bleed easily  Psychiatric/Behavioral: Negative for agitation, confusion and decreased concentration  All other systems reviewed and are negative        Physical Exam  ED Triage Vitals   Temperature Pulse Respirations Blood Pressure SpO2   12/29/17 1824 12/29/17 1824 12/29/17 1824 12/29/17 1824 12/29/17 1824   98 1 °F (36 7 °C) 66 14 (!) 186/97 98 %      Temp Source Heart Rate Source Patient Position - Orthostatic VS BP Location FiO2 (%)   12/29/17 1824 12/29/17 1858 12/29/17 1858 12/29/17 1858 --   Oral Monitor Lying Right arm       Pain Score       12/29/17 1824       No Pain           Orthostatic Vital Signs  Vitals:    12/29/17 1824 12/29/17 1858 12/29/17 1922 12/29/17 2022   BP: (!) 186/97 (!) 202/107 (!) 172/105 (!) 185/108   Pulse: 66 67  71   Patient Position - Orthostatic VS:  Lying  Lying       Physical Exam   Constitutional: He is oriented to person, place, and time  He appears well-developed and well-nourished  Non-toxic appearance  HENT:   Head: Normocephalic and atraumatic  Right Ear: Tympanic membrane normal    Left Ear: Tympanic membrane normal    Nose: Nose normal    Mouth/Throat: Oropharynx is clear and moist    Eyes: Conjunctivae, EOM and lids are normal  Pupils are equal, round, and reactive to light  Neck: Trachea normal and normal range of motion  Neck supple  No JVD present  Carotid bruit is not present  Cardiovascular: Normal rate, regular rhythm, normal heart sounds and intact distal pulses  No extrasystoles are present  Pulmonary/Chest: Effort normal  He has no decreased breath sounds  He has no wheezes  He has no rhonchi  He has no rales  He exhibits no tenderness and no deformity  Abdominal: Soft  Bowel sounds are normal  There is no tenderness  There is no rebound, no guarding and no CVA tenderness  Musculoskeletal:        Right shoulder: He exhibits normal range of motion, no tenderness, no swelling and no deformity  Cervical back: Normal  He exhibits normal range of motion, no tenderness, no bony tenderness and no deformity  Lymphadenopathy:     He has no cervical adenopathy  He has no axillary adenopathy  Neurological: He is alert and oriented to person, place, and time  He has normal strength and normal reflexes  No cranial nerve deficit or sensory deficit  He displays a negative Romberg sign  Skin: Skin is warm and dry  Psychiatric: He has a normal mood and affect   His speech is normal and behavior is normal  Judgment and thought content normal  Cognition and memory are normal    Nursing note and vitals reviewed  ED Medications  Medications   amLODIPine (NORVASC) tablet 10 mg (10 mg Oral Given 12/29/17 1922)   losartan (COZAAR) tablet 50 mg (50 mg Oral Given 12/29/17 2038)       Diagnostic Studies  Results Reviewed     Procedure Component Value Units Date/Time    POCT Chem 8+ [[id number]]  (Abnormal) Collected:  12/29/17 1936    Lab Status:  Final result Updated:  12/29/17 1941     SODIUM, I-STAT 146 (H) mmol/l      Potassium, i-STAT 4 2 mmol/L      Chloride, istat 107 mmol/L      CO2, i-STAT 27 mmol/L      Anion Gap, Istat 17 (H) mmol/L      Calcium, Ionized i-STAT 1 22 mmol/L      BUN, I-STAT 23 mg/dl      Creatinine, i-STAT 1 6 (H) mg/dl      eGFR 42 ml/min/1 73sq m      Glucose, i-STAT 114 mg/dl      Hct, i-STAT 39 %      Hgb, i-STAT 13 3 g/dl      Specimen Type VENOUS                 No orders to display              Procedures  Procedures       Phone Contacts  ED Phone Contact    ED Course  ED Course                                MDM  Number of Diagnoses or Management Options  Hypertension: established and worsening  Noncompliance: new and requires workup     Amount and/or Complexity of Data Reviewed  Clinical lab tests: reviewed and ordered  Tests in the medicine section of CPT®: ordered and reviewed    Patient Progress  Patient progress: improved    CritCare Time    Disposition  Final diagnoses:   Hypertension   Noncompliance     Time reflects when diagnosis was documented in both MDM as applicable and the Disposition within this note     Time User Action Codes Description Comment    12/29/2017  7:45 PM Gilma CRAFT Add [I10] Hypertension     12/29/2017  7:45 PM Jessi Martin Add [Z91 19] Noncompliance       ED Disposition     ED Disposition Condition Comment    Discharge  Oliver SEBASTIAN Core discharge to home/self care      Condition at discharge: Good        Follow-up Information Follow up With Specialties Details [address], MD Geriatric Medicine Schedule an appointment as soon as possible for a visit  [address]  [phone]          Discharge Medication List as of 12/29/2017  7:56 PM      START taking these medications    Details   losartan (COZAAR) 50 mg tablet Take 0 5 tablets by mouth daily, Starting Fri 12/29/2017, Normal         CONTINUE these medications which have NOT CHANGED    Details   amLODIPine (NORVASC) 10 mg tablet Take 10 mg by mouth daily, Historical Med      omeprazole (PriLOSEC) 20 mg delayed release capsule Take 20 mg by mouth daily, Historical Med      oxyCODONE (ROXICODONE) 5 mg immediate release tablet 1 tablets every 4 hours as needed for pain  , Print      tamsulosin (FLOMAX) 0 4 mg Take 0 4 mg by mouth daily with dinner, Historical Med           No discharge procedures on file      ED Provider  Electronically Signed by           Janny Marsh DO  12/29/17 1481

## 2017-12-30 NOTE — DISCHARGE INSTRUCTIONS
Chronic Hypertension, Ambulatory Care   GENERAL INFORMATION:   Chronic hypertension  is a long-term condition in which your blood pressure (BP) is higher than normal  Your BP is the force of your blood moving against the walls of your arteries  Hypertension is a BP of 140/90 or higher  Common symptoms include the following:   · Headache     · Blurred vision    · Chest pain     · Dizziness or weakness     · Trouble breathing     · Nosebleeds  Seek immediate care for the following symptoms:   · Severe headache or vision loss    · Weakness in an arm or leg    · Confusion or difficulty speaking    · Discomfort in your chest that feels like squeezing, pressure, fullness, or pain    · Suddenly feeling lightheaded or trouble breathing    · Pain or discomfort in your back, neck, jaw, stomach, or arm  Treatment for chronic hypertension  may include medicine to lower your BP  You may also need to make lifestyle changes  Take your medicine exactly as directed  Manage chronic hypertension:   · Take your BP at home  Sit and rest for 5 minutes before you take your BP  Extend your arm and support it on a flat surface  Your arm should be at the same level as your heart  Follow the directions that came with your BP monitor  If possible, take at least 2 BP readings each time  Take your BP at least twice a day at the same times each day, such as morning and evening  Keep a log of your BP readings and bring it to your follow-up visits  · Eat less sodium (salt)  Do not add sodium to your food  Limit foods that are high in sodium, such as canned foods, potato chips, and cold cuts  Your healthcare provider may suggest that you follow the [address] [place] [address] Street  The plan is low in sodium, unhealthy fats, and total fat  It is high in potassium, calcium, and fiber  · Exercise regularly  Exercise at least 30 minutes per day, on most days of the week  This will help decrease your BP   Ask your healthcare provider about the best exercise plan for you  · Limit alcohol  Women should limit alcohol to 1 drink a day  Men should limit alcohol to 2 drinks a day  A drink of alcohol is 12 ounces of beer, 5 ounces of wine, or 1½ ounces of liquor  · Do not smoke  If you smoke, it is never too late to quit  Smoking can increase your BP  Smoking also worsens other health conditions you may have that can increase your risk for hypertension  Ask your healthcare provider for information if you need help quitting  Follow up with your healthcare provider as directed: You will need to return to have your BP checked and to have other lab tests done  Write down your questions so you remember to ask them during your visits  CARE AGREEMENT:   You have the right to help plan your care  Learn about your health condition and how it may be treated  Discuss treatment options with your caregivers to decide what care you want to receive  You always have the right to refuse treatment  The above information is an  only  It is not intended as medical advice for individual conditions or treatments  Talk to your doctor, nurse or pharmacist before following any medical regimen to see if it is safe and effective for you  © 2014 [address] is for End User's use only and may not be sold, redistributed or otherwise used for commercial purposes  All illustrations and images included in CareNotes® are the copyrighted property of A D A M , Inc  or Orion Castillo

## 2018-01-02 ENCOUNTER — APPOINTMENT (OUTPATIENT)
Dept: PHYSICAL THERAPY | Age: 78
End: 2018-01-02
Payer: COMMERCIAL

## 2018-01-02 PROCEDURE — 97140 MANUAL THERAPY 1/> REGIONS: CPT

## 2018-01-02 PROCEDURE — G8991 OTHER PT/OT GOAL STATUS: HCPCS

## 2018-01-02 PROCEDURE — G8990 OTHER PT/OT CURRENT STATUS: HCPCS

## 2018-01-02 PROCEDURE — 97110 THERAPEUTIC EXERCISES: CPT

## 2018-01-03 ENCOUNTER — GENERIC CONVERSION - ENCOUNTER (OUTPATIENT)
Dept: OBGYN CLINIC | Facility: OTHER | Age: 78
End: 2018-01-03

## 2018-01-04 ENCOUNTER — APPOINTMENT (OUTPATIENT)
Dept: PHYSICAL THERAPY | Age: 78
End: 2018-01-04
Payer: COMMERCIAL

## 2018-01-04 PROCEDURE — 97110 THERAPEUTIC EXERCISES: CPT

## 2018-01-04 PROCEDURE — 97140 MANUAL THERAPY 1/> REGIONS: CPT

## 2018-01-08 ENCOUNTER — GENERIC CONVERSION - ENCOUNTER (OUTPATIENT)
Dept: OTHER | Facility: OTHER | Age: 78
End: 2018-01-08

## 2018-01-08 ENCOUNTER — ALLSCRIPTS OFFICE VISIT (OUTPATIENT)
Dept: OTHER | Facility: OTHER | Age: 78
End: 2018-01-08

## 2018-01-09 ENCOUNTER — APPOINTMENT (OUTPATIENT)
Dept: PHYSICAL THERAPY | Age: 78
End: 2018-01-09
Payer: COMMERCIAL

## 2018-01-09 ENCOUNTER — ALLSCRIPTS OFFICE VISIT (OUTPATIENT)
Dept: OTHER | Facility: OTHER | Age: 78
End: 2018-01-09

## 2018-01-09 PROCEDURE — 97140 MANUAL THERAPY 1/> REGIONS: CPT

## 2018-01-09 PROCEDURE — 97110 THERAPEUTIC EXERCISES: CPT

## 2018-01-09 NOTE — PROGRESS NOTES
Assessment   1  Chronic kidney disease (CKD), stage III (moderate) (585 3) (N18 3)   2  Benign essential hypertension (401 1) (I10)   3  Benign localized hyperplasia of prostate (600 20) (N40 0)    Plan   Benign essential hypertension    · From  Losartan Potassium 50 MG Oral Tablet TAKE 1 TABLET BY MOUTH    ONCE DAILY  (PATIENT NEEDS TO MAKE AN APPOINTMENT) To Losartan Potassium    100 MG Oral Tablet TAKE 1 TABLET DAILY   Rx By: Onelia Guido; Dispense: 90 Days ; #:90 Tablet; Refill: 3;For: Benign essential hypertension; ANGELES = N; Sent To: [address]    · (1) BASIC METABOLIC PROFILE; Status:Active; Requested AOF:[id number]; Perform:[facility]; ZLJ:[id number];FBPUDQB; For:Benign essential hypertension; Ordered By:Kieran Becker;   · (1) PTH N-TERMINAL (INTACT); Status:Active; Requested NYB:[id number]; Perform:[facility]; IAJ:[id number];YIFOGMP; For:Benign essential hypertension; Ordered By:Kieran Becker;  Benign localized hyperplasia of prostate    · 1 - Tomas Mccabe MD, Connie Cassidy (Urology) Co-Management  *  Status: Active  Requested    for: 06GTI2715   Ordered; For: Benign localized hyperplasia of prostate; Ordered By: Onelia Guido Performed:  Due: 45IPG6290  Care Summary provided  : Yes  Chronic kidney disease (CKD), stage III (moderate)    · (1) CBC/PLT/DIFF; Status:Active; Requested PCW:[id number];    Perform:[facility]; WTJ:[id number];PFAQUVD; For:Chronic kidney disease (CKD), stage III (moderate); Ordered By:Kieran Becker;   · (1) PTH N-TERMINAL (INTACT); Status:Active; Requested LLH:[id number];    Perform:[facility]; TPY:[id number];INLWDVA; For:Chronic kidney disease (CKD), stage III (moderate); Ordered By:Kieran Becker;   · (1) RENAL FUNCTION PANEL; Status:Active; Requested KCN:[id number];    Perform:[facility]; THM:[id number];ZCVEEMARION; For:Chronic kidney disease (CKD), stage III (moderate);  Ordered By:Eugenio Kieran;   · (1) URINE PROTEIN CREATININE RATIO; Status:Active; Requested ADAM:[id number];    Perform:[facility]; DZE:[id number];XXLXGOL; For:Chronic kidney disease (CKD), stage III (moderate); Ordered By:Te Becker;   · Follow-up visit in 1 year Evaluation and Treatment  Follow-up  Status: Hold For -    Scheduling  Requested for: [id number]   Ordered; For: Chronic kidney disease (CKD), stage III (moderate); Ordered By: Noemi Espinoza Performed:  Due: 09OJE2542   · Do not take aspirin or anti-inflammatory medicines ; Status:Complete;   Done:    63HYW0674   Ordered; For:Chronic kidney disease (CKD), stage III (moderate); Ordered By:Kieran Becker;   · High Blood Pressure handout given today; Status:Complete;   Done: 26KKZ3785   Ordered; For:Chronic kidney disease (CKD), stage III (moderate); Ordered By:Te Becker;   · Restrict your sodium (salt) intake to 2 grams per day ; Status:Complete;   Done:    79KGP7168   Ordered; For:Chronic kidney disease (CKD), stage III (moderate); Ordered By:Te Becker;   · Understanding CKD handout given today; Status:Complete;   Done: 93TZL7796   Ordered; For:Chronic kidney disease (CKD), stage III (moderate); Ordered By:Te Becker;    Discussion/Summary      44-year-old gentleman who returns for CKD follow-up  Chronic kidney disease stage IIIA, previous creatinine around 1 6-1 7 since January 2017, most recent creatinine on December 2017 of 1 6  function fairly stable for the last 2 years  again advised about importance of avoiding NSAIDs (no ibuprofen, Motrin, Aleve, naproxen)  to take Tylenol as needed for pain  Hypertension, currently blood pressure elevated since stop taking amlodipine  increase losartan to 100 mg 1 tablet daily, repeat a BMP with PTH in 7 days to make sure kidney function remains stable after increasing dose of the ARB  a low-salt diet     Prostatomegaly with urinary symptoms, once again advised about importance of seeing a urologist, urology referral placed  Mineral bone disease, will check PTH within labs next week  renal function remains stable will follow him in 1 year  The patient, patient's family was counseled regarding diagnostic results,-- instructions for management,-- risk factor reductions,-- risks and benefits of treatment options,-- importance of compliance with treatment  Patient is able to Self-Care  He has no barriers to learning  Indication for Services: CKD Stage 3  CKD Teaching includes hypertension management, Avoid nephrotoxic medication, sodium restriction and fluid management  Current Patient Status: no worsening of renal function  Discussed with the patient Discussed with the patient's family      Reason For Visit   CKD 3 follow-up      History of Present Illness   This 29-year-old gentleman from [facility] with chronic kidney disease with a previous creatinine around 1 6-1 7 since January 2016, hypertension for almost 10 years, previous renal ultrasound showed an atrophic right kidney and prostatomegaly, 1st time seen in our office on September 2016 for evaluation of CKD  returns for follow-up with her daughter  last visit he had a right shoulder replacement on December 2017  with chronic joint pains especially over the hands, he does not take any NSAIDs  with problems urinating, wakes up at night 4-5 times to urinate, noticed decreased urine stream, have urinary hesitancy  any chest pain, no shortness of breath, no lower extremity edema since he stop amlodipine, no abdominal pain, nausea, vomiting or diarrhea    tobacco or alcohol use  family history of kidney disease  Review of Systems        Constitutional: recent weight gain, but-- no fever,-- no chills,-- no anorexia,-- no fatigue-- and-- no recent weight loss  Integumentary: no rashes  Gastrointestinal: no abdominal pain,-- no constipation,-- no nausea,-- no diarrhea-- and-- no vomiting        Respiratory: no shortness of breath,-- no cough-- and-- not coughing up sputum  Cardiovascular: no orthopnea,-- no PND,-- no chest pain,-- no palpitations-- and-- no lower extremity edema  Musculoskeletal: joint pain-- and-- joint swelling  Neurological: no headache,-- no lightheadedness-- and-- no dizziness  Genitourinary: nocturia,-- urinary frequency-- and-- incomplete emptying of bladder, but-- no dysuria-- and-- no hematuria  Eyes: no eyesight problems-- and-- no dryness of the eyes  ENT: no hearing loss-- and-- no nasal discharge  Past Medical History      The active problems and past medical history were reviewed and updated today  Surgical History      The surgical history was reviewed and updated today  Family History      The family history was reviewed and updated today  Social History   The social history was reviewed and updated today  Current Meds    1  Losartan Potassium 50 MG Oral Tablet; TAKE 1 TABLET BY MOUTH ONCE DAILY  (PATIENT NEEDS TO MAKE AN APPOINTMENT); Therapy: [id number] to ([facility])  Requested for: 94JVX0572; Last     Rx:[id number] Ordered     The medication list was reviewed and updated today  Allergies   1  No Known Drug Allergies  2  No Known Environmental Allergies   3  No Known Food Allergies    Vitals   Vital Signs    Recorded: 40EQM1175 03:13PM Recorded: 42NAJ6874 02:47PM   Heart Rate 66    Pulse Quality Regular    Systolic 312, LUE, Sitting    Diastolic 90, LUE, Sitting    Height  5 ft 8 in   Weight  186 lb 4 oz   BMI Calculated  28 32   BSA Calculated  1 98     Physical Exam        Constitutional: General appearance: No acute distress, well appearing and well nourished  ENT: External ears and nose appear normal          Eyes: Anicteric sclerae  JVD:  No JVD present  Pulmonary:  Auscultation of lungs: Clear to auscultation  Cardiovascular:  Auscultation of heart: Normal rate and rhythm, normal S1 and S2, without murmurs  Abdomen: Non-tender, no masses  Extremities: No cyanosis, clubbing or edema  Rash: No rash present  Neurologic: Non Focal          Psychiatric: Orientation to person, place, and time: Normal        Back: No CVA tenderness  Results/Data   (1) BASIC METABOLIC PROFILE 85MDY4127 09:26AM EPIC, Provider   Test ordered by: Brent Cannon      Test Name Result Flag Reference   SODIUM 143 mmol/L  136-145   POTASSIUM 3 9 mmol/L  3 5-5 3   CHLORIDE 110 mmol/L H 100-108   CARBON DIOXIDE 28 mmol/L  21-32   ANION GAP (CALC) 5 mmol/L  4-13   BLOOD UREA NITROGEN 31 mg/dL H 5-25   CREATININE 1 62 mg/dL H 0 60-1 30   Standardized to IDMS reference method   CALCIUM 8 5 mg/dL  8 3-10 1   eGFR 41 ml/min/1 73sq m     This is a patient instruction: Patient fasting for 8 hours or longer recommended  National Kidney Disease Education Program recommendations are as follows:     GFR calculation is accurate only with a steady state creatinine     Chronic Kidney disease less than 60 ml/min/1 73 sq  meters     Kidney failure less than 15 ml/min/1 73 sq  meters  GLUCOSE FASTING 96 mg/dL  65-99   Specimen collection should occur prior to Sulfasalazine administration due to the potential for falsely depressed results  Specimen collection should occur prior to Sulfapyridine administration due to the potential for falsely elevated results  Future Appointments      Date/Time Provider Specialty Site   02/19/2018 01:40 PM Michael Morocho, [facility] Orthopedic Surgery [address]   01/09/2018 02:20 PM NEETU Damon   [address] [place] [address]     Signatures    Electronically signed by : NEETU León ; Jan 8 2018  3:20PM EST                       (Author)

## 2018-01-09 NOTE — PROGRESS NOTES
Assessment    1  Benign prostatic hypertrophy without urinary obstruction (600 00) (N40 0)   2  Joint pain, knee (719 46) (M29 799)    Plan  Joint pain, knee    · Meloxicam 15 MG Oral Tablet; TAKE 1 TABLET DAILY   · * XR KNEE 3 VIEW LEFT; Status:Active; Requested SAMUEL:[id number];     Discussion/Summary    The patient is a 42-year-old male  1  BPH-symptoms much improved with finasteride  The patient will continue taking this medication, and keep track of his symptoms  No current concerns of prostate cancer  Repeat be PSA in one year if symptoms worsen  2  Left knee pain-the patient is being sent for knee x-rays to evaluate  The patient is being started on a short course of meloxicam  The patient will return in one week for reevaluation  Chief Complaint  BPH, Left Knee Pain      History of Present Illness  Patient states that he has been urinating more normally since starting his finasteride  The patient states that he is able to hold his urine for 2-3 hours at a time  The patient also states that he has only been rising at night one or 2 times at the most to urinate  The patient states that he feels his stream is stronger, he has no urinary hesitation, nor any feelings of incomplete emptying  The patient's PSA was equal to 0 8  Several years ago the patient suffered a traumatic dislocation of his left knee  He states that in the past several weeks his chronic low intensity knee pain has been worsening  The patient has been taking some Tylenol to help with his pain  Review of Systems    Constitutional: no fever, not feeling poorly and no chills  ENT: no sore throat and no nasal discharge  Cardiovascular: no chest pain  Respiratory: no shortness of breath, no cough, no orthopnea, no wheezing and no shortness of breath during exertion  Gastrointestinal: no abdominal pain, no nausea, no vomiting, no constipation and no diarrhea     Musculoskeletal: arthralgias, but as noted in HPI, no joint swelling and no myalgias  Integumentary: no rashes and no skin wound  Neurological: no headache, no numbness, no tingling, no dizziness and no fainting  Psychiatric: no anxiety and no depression  Hematologic/Lymphatic: no swollen glands  Active Problems    1  Acromioclavicular joint separation (831 04) (S43 109A)   2  Acute renal failure (584 9) (N17 9)   3  Back pain (724 5) (M54 9)   4  Benign essential hypertension (401 1) (I10)   5  Benign prostatic hypertrophy without urinary obstruction (600 00) (N40 0)   6  Cellulitis of forearm (682 3) (L03 119)   7  Chronic cough (786 2) (R05)   8  Chronic pain of both shoulders (719 41,338 29) (M25 512,M25 511,G89 29)   9  Closed Fracture Of The Distal End Of The Radius (813 42)   10  Itching (698 9) (L29 9)   11  Joint pain, knee (719 46) (M25 569)   12  Left forearm pain (729 5) (M79 632)   13  Need for immunization against influenza (V04 81) (Z23)   14  Poison ivy dermatitis (692 6) (L23 7)   15  Pulmonary nodule seen on imaging study (793 11) (R91 1)   16  Shoulder joint pain, unspecified laterality (719 41) (M25 519)    Past Medical History    1  History of Arthritis (V13 4)    Surgical History    1  History of Appendectomy    Family History    1  Family history of Hypertension (V17 49)    Social History    · Denied: Alcohol   · Former smoker (V15 82) (K24 810)   · Marital History - Currently     Current Meds   1  AmLODIPine Besylate 10 MG Oral Tablet; take 1 tablet by mouth once daily; Therapy: 34CHJ1446 to (Evaluate:24Jan2016)  Requested for: 41YQY1639; Last   Rx:26Oct2015 Ordered   2  Finasteride 5 MG Oral Tablet; TAKE 1 TABLET DAILY AS DIRECTED; Therapy: 07NZW3386 to ([facility] [place])  Requested for: 73FEM4671; Last   Rx:08Jan2016 Ordered   3  Losartan Potassium 50 MG Oral Tablet; Take 1 tablet daily; Therapy: 26ZRR1195 to ([facility] [place])  Requested for: 00OKG7706; Last   Rx:16Ccx0824 Ordered    Allergies    1   No Known Drug Allergies 2  No Known Environmental Allergies   3  No Known Food Allergies    Vitals  Vital Signs [Data Includes: Current Encounter]    Recorded: 94KXZ1341 02:22PM   Temperature 96 1 F   Heart Rate 72   Respiration 16   Systolic 809   Diastolic 78   Height 5 ft 8 in   Weight 185 lb    BMI Calculated 28 13   BSA Calculated 1 98     Physical Exam    Constitutional   General appearance: No acute distress, well appearing and well nourished  Ears, Nose, Mouth, and Throat   External inspection of ears and nose: Normal     Pulmonary   Respiratory effort: No increased work of breathing or signs of respiratory distress  Auscultation of lungs: Clear to auscultation, equal breath sounds bilaterally, no wheezes, no rales, no rhonci  Cardiovascular   Auscultation of heart: Normal rate and rhythm, normal S1 and S2, without murmurs  Musculoskeletal   Gait and station: Normal     Digits and nails: Normal without clubbing or cyanosis  Inspection/palpation of joints, bones, and muscles: Abnormal   TTP over left medial knee joint  Patellar grind negative  Fabere negative  Lateral Eric positive  Pain with flexion past 90 degrees   Neurologic   Reflexes: 2+ and symmetric  Sensation: No sensory loss  Psychiatric   Orientation to person, place and time: Normal     Mood and affect: Normal          Attending Note  Attending Note: I discussed the case with the Resident and reviewed the Resident's note and I agree with the Resident management plan as it was presented to me  Level of Participation: I was present in clinic, but did not examine the patient  Diagnosis and Plan: BPH: improved, continue current treatment  Knee pain: try NSAIDs, check xray  I agree with the Resident's note        Future Appointments    Date/Time Provider Specialty Site   02/05/2016 04:00 PM Lauri Bravo     Signatures   Electronically signed by : Hyun Obrien, ; Jan 25 2016  3:31PM EST (Author)    Electronically signed by : NEETU Maynard ; Jan 31 2016  3:17PM EST                       (Author)

## 2018-01-11 ENCOUNTER — APPOINTMENT (OUTPATIENT)
Dept: PHYSICAL THERAPY | Age: 78
End: 2018-01-11
Payer: COMMERCIAL

## 2018-01-11 NOTE — CONSULTS
Chief Complaint  Preoperative clearance for right shoulder arthroplasty      History of Present Illness  Pre-Op Visit (Brief): The patient is being seen for a preoperative visit and Right reverse total shoulder arthroplasty with excision of ganglion cyst    Surgical Risk Assessment:   Prior Anesthesia: He had prior anesthesia and no prior adverse reaction to general anesthesia  Pertinent Past Medical History: no pertinent past medical history  Exercise Capacity: able to walk four blocks without symptoms and able to walk two flights of stairs without symptoms  Lifestyle Factors: denies alcohol use, denies tobacco use and denies illegal drug use  Symptoms: no symptoms, no easy bleeding, no easy bruising, no frequent nosebleeds, no chest pain, no cough, no dyspnea, edema, no palpitations and no wheezing  STOP questionnaire score is 1  Other WILDER risk factors include male gender and age over 48, but normal BMI and normal neck circumference  Predicted risk of WILDER: Mild  Pertinent Family History: no pertinent family historyno family history of an adverse reaction to anesthesia, no aneurysm, no bleeding problems, no sudden early deaths, no ischemic heart disease and no stroke  Living Situation: home is secure and supportive  HPI: Pt presents for preoperative clearance for right reverse total shoulder arthroplasty with ganglion cyst excision performed by Dr Tawanna Pedroza  Review of Systems    Constitutional: no fever, not feeling poorly, no chills and not feeling tired  ENT: no earache and no sore throat  Cardiovascular: no chest pain and no palpitations  Respiratory: no shortness of breath, no cough, no wheezing and no shortness of breath during exertion  Gastrointestinal: no abdominal pain, no nausea, no vomiting and no diarrhea  Musculoskeletal: as noted in HPI  Integumentary: no rashes  Hematologic/Lymphatic: no tendency for easy bleeding  ROS reviewed  Active Problems     1  Abdominal distention (787 3) (R14 0)   2  Abdominal pain (789 00) (R10 9)   3  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)   4  Acute renal failure (584 9) (N17 9)   5  Back pain (724 5) (M54 9)   6  Benign essential hypertension (401 1) (I10)   7  Benign prostatic hypertrophy without urinary obstruction (600 00) (N40 0)   8  Bilateral edema of lower extremity (782 3) (R60 0)   9  Cellulitis of forearm (682 3) (L03 119)   10  Chronic cough (786 2) (R05)   11  Chronic kidney disease (CKD), stage III (moderate) (585 3) (N18 3)   12  Chronic pain of both knees (416 29,116 52) (M25 561,M25 562,G89 29)   13  Chronic pain of both shoulders (719 41,338 29) (M25 511,M25 512,G89 29)   14  Closed Fracture Of The Distal End Of The Radius (813 42)   15  Colon cancer screening (V76 51) (Z12 11)   16  Cyst of joint of shoulder (719 81) (M25 819)   17  Itching (698 9) (L29 9)   18  Joint pain, knee (719 46) (M25 569)   19  Kidney problem (593 9) (N28 9)   20  Left forearm pain (729 5) (M79 632)   21  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 (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) (M19 211)   22  Need for immunization against influenza (V04 81) (Z23)   23  Overweight (278 02) (E66 3)   24  Poison ivy dermatitis (692 6) (L23 7)   25  Primary localized osteoarthritis of both knees (715 16) (M17 0)   26  Primary osteoarthritis of left knee (715 16) (M17 12)   27  Pulmonary nodule seen on imaging study (793 11) (R91 1)   28  Renal function test abnormal (794 4) (R94 4)   29  Right knee pain (719 46) (M25 561)   30  Shortness of breath (786 05) (R06 02)   31  Shoulder joint pain, unspecified laterality    Shoulder pain, right (719 41) (M25 511)          Past Medical History    · History of Arthritis (V13 4)    The active problems and past medical history were reviewed and updated today  Surgical History    · History of Appendectomy   · History of Wrist Surgery    The surgical history was reviewed and updated today         Family History    · Family history of Hypertension (V17 49)    · Family history of arthritis (V17 7) (Z82 61)    The family history was reviewed and updated today  Social History    · Denied: Alcohol   · Drinks coffee   · Former smoker (V15 82) (N03 276)   · Marital History - Currently    · Never a smoker  The social history was reviewed and updated today  Current Meds   1  AmLODIPine Besylate 10 MG Oral Tablet; take 1 tablet by mouth once daily; Therapy: 83WHJ2929 to (Evaluate:15Jan2018)  Requested for: 20Jan2017; Last   Rx:20Jan2017 Ordered   2  Losartan Potassium 50 MG Oral Tablet; TAKE 1 TABLET BY MOUTH ONCE DAILY  (PATIENT NEEDS TO MAKE AN APPOINTMENT); Therapy: 98ZPW7654 to ([facility])  Requested for: 24HJY9561; Last   Rx:05Sxb5084 Ordered   3  Omeprazole 20 MG Oral Capsule Delayed Release; TAKE 1 CAPSULE BY MOUTH DAILY   AT BEDTIME; Therapy: 16ZWH0058 to (Evaluate:01Apr2018)  Requested for: 05AJY7635; Last   Rx:32Qbc6677 Ordered    The medication list was reviewed and updated today  Allergies    1  No Known Drug Allergies    2  No Known Environmental Allergies   3  No Known Food Allergies    Vitals  Signs    Temperature: 96 6 F  Heart Rate: 64  Respiration: 16  Systolic: 239  Diastolic: 84  Height: 5 ft 8 in  Weight: 187 lb 2 oz  BMI Calculated: 28 45  BSA Calculated: 1 99  Pain Scale: 0    Physical Exam    Constitutional   General appearance: No acute distress, well appearing and well nourished  Head and Face   Head and face: Normal     Eyes   Conjunctiva and lids: No erythema, swelling or discharge  Neck   Neck: Supple, symmetric, trachea midline, no masses  Pulmonary   Respiratory effort: No increased work of breathing or signs of respiratory distress  Auscultation of lungs: Clear to auscultation  Cardiovascular   Auscultation of heart: Normal rate and rhythm, normal S1 and S2, no murmurs  Pedal pulses: 2+ bilaterally      Examination of extremities for edema and/or varicosities: Normal     Abdomen   Abdomen: Non-tender, no masses  Lymphatic   Palpation of lymph nodes in neck: No lymphadenopathy  Musculoskeletal   Gait and station: Normal     Skin   Skin and subcutaneous tissue: Normal without rashes or lesions  Psychiatric   Judgment and insight: Normal     Orientation to person, place and time: Normal     Mood and affect: Normal        Assessment    1  Preoperative clearance (V72 84) (Z01 818)   2  Rotator cuff tear, right (840 4) (M38 101)    Discussion/Summary  Surgical Clearance: He is at a LOW risk from a cardiovascular standpoint at this time without any additional cardiac testing  Reevaluation needed, if he should present with symptoms prior to surgery/procedure  Comments:  Pt is a low risk Pt for a moderate risk surgery  Surgical clearance faxed to Dr Panda Mitchell   1  Preoperative clearance/right reverse total shoulder arthroplasty with ganglion cyst excision  -Pt is low risk for a moderate risk procedure and may proceed to the OR on 12/5/2017  -RCRI is 0, making Pt low risk for MACE  -reviewed blood work and EKG ordered by Dr Panda Mitchell  CBC within normal limits, A1c 5 3, CMP reveals creatinine of 1 51, which is an improvement from prior at 1 73, which appears to be his baseline  Pt does have history of CKD 3  GFR is currently 45, again improved from prior at 38 7  EKG normal sinus rhythm    Keep F/U appointment with Dr Sanjay Mcfarland on 1/9/2018  The patient, patient's family was counseled regarding diagnostic results, instructions for management, patient and family education, impressions, importance of compliance with treatment  The treatment plan was reviewed with the patient/guardian  The patient/guardian understands and agrees with the treatment plan     Self Referrals: No      End of Encounter Meds    1  Omeprazole 20 MG Oral Capsule Delayed Release; TAKE 1 CAPSULE BY MOUTH DAILY   AT BEDTIME;    Therapy: 92KDG1394 to (Evaluate:01Apr2018) Requested for: 23XTC5498; Last   Rx:11Mxs1266 Ordered    2  AmLODIPine Besylate 10 MG Oral Tablet; take 1 tablet by mouth once daily; Therapy: 07VIW2959 to (Evaluate:15Jan2018)  Requested for: 20Jan2017; Last   Rx:20Jan2017 Ordered   3  Losartan Potassium 50 MG Oral Tablet; TAKE 1 TABLET BY MOUTH ONCE DAILY  (PATIENT NEEDS TO MAKE AN APPOINTMENT);    Therapy: 20ROH9474 to (Nimo Bus[facility])  Requested for: 69XUE4142; Last   Rx:[id number] Ordered    Signatures   Electronically signed by : Hope Saini DO; Nov 29 2017  7:23AM EST                       (Author)    Electronically signed by : Marquez Bailey DO; Dec 22 2017  9:46AM EST                       (Author)

## 2018-01-11 NOTE — PROGRESS NOTES
Preliminary Nursing Report                Patient Information    Initial Encounter Entry Date:   2017 1:42 PM EST (Automated Transmission Automated Transmission)       Last Modified:   {Av Pollard}              Legal Name: Myriam Ray Number:        YOB: 1940        Age (years): 78        Gender: M        Body Mass Index (BMI): 28 kg/m2        Height: 68 in  Weight: 182 lbs (83 kgs)           Address:   [address] [place] [address] [place]              Phone: [phone]   (consent to leave messages)        Email:        Ethnicity: Decline to State        Gnosticism:        Marital Status:        Preferred Language: English        Race: Other Race                    Patient Insurance Information        Primary Insurance Information Carrier Name: {Primary  CarrierName}           Carrier Address:   {Primary  CarrierAddress}              Carrier Phone: {Primary  CarrierPhone}          Group Number: {Primary  GroupNumber}          Policy Number: {Primary  PolicyNumber}          Insured Name: {Primary  InsuredName}          Insured : {Primary  InsuredDOB}          Relationship to Insured: {Primary  RelationshiptoInsured}           Secondary Insurance Information Carrier Name: {Secondary  CarrierName}           Carrier Address:   {Secondary  CarrierAddress}              Carrier Phone: {Secondary  CarrierPhone}          Group Number: {Secondary  GroupNumber}          Policy Number: {Secondary  PolicyNumber}          Insured Name: {Secondary  InsuredName}          Insured : {Secondary  InsuredDOB}          Relationship to Insured: {Secondary  RelationshiptoInsured}                       Health Profile   Booking #:   Linda Phan #: [id number]               DOS: 2017    Surgery : RECONSTRUCT SHOULDER JOINT      Add'l Procedures/Notes:     Surgery Risk: Intermediate          Precautions     Chronic kidney disease (CKD), stage III (moderate)       Kidney problem                   Allergies    No Known Drug Allergies       No Known Environmental Allergies       No Known Food Allergies       Clinical Comments: Reaction Type: , Reaction: , Severity:              Medications    AmLODIPine Besylate 10 MG Oral Tablet       Losartan Potassium 50 MG Oral Tablet       Omeprazole 20 MG Oral Capsule Delayed Release               Conditions    Abdominal distention       Abdominal pain       Acromioclavicular joint arthritis       Acute renal failure       Back pain       Benign essential hypertension       Benign prostatic hypertrophy without urinary obstruction       Bilateral edema of lower extremity       Cellulitis of forearm       Chronic cough       Chronic kidney disease (CKD), stage III (moderate)       Chronic pain of both knees       Chronic pain of both shoulders       Closed Fracture Of The Distal End Of The Radius       Colon cancer screening       Cyst of joint of shoulder       Itching       Joint pain, knee       Kidney problem       Left forearm pain       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       Need for immunization against influenza       Overweight       Poison ivy dermatitis       Primary localized osteoarthritis of both knees       Primary osteoarthritis of left knee       Pulmonary nodule seen on imaging study       Renal function test abnormal       Right knee pain       Shortness of breath       Shoulder joint pain, unspecified laterality       Shoulder pain, right               Family History    None             Surgical History    None             Social History    Denies Alcohol       Drinks coffee       Former smoker       Marital History - Currently        Never a smoker                               Patient Instructions       Medical Procedure Risk  NPO Instructions   The day before surgery it is recommended to have a light dinner at your usual time and you are allowed a light snack early in the evening   Do not eat anything heavy or eat a big meal after 7pm  Do not eat or drink anything after midnight prior to your surgery  If you are supposed to take any of your medications, do so with a sip of water  Failure to follow these instructions can lead to an increased risk of lung complications and may result in a delay or cancellation of your procedure  If you have any questions, contact your institution for further instructions  No candy, no gum, no mints, no chewing tobacco          Losartan Potassium 50 MG Oral Tablet  Medication Instruction (ACE/ARB - Blood Pressure Medication) 1  Please continue the following medications up to the evening before surgery, but do not take it on the day of surgery  Please restart your medications as soon as clinically feasible  AmLODIPine Besylate 10 MG Oral Tablet  Calcium Blocker (Blood Pressure Medication) 3, 4, 6, 5, 2  Please continue to take this medication on your normal schedule  If this is an oral medication and you take in the morning, you may do so with a sip of water  Kidney problem  Dialysis   If undergoing dialysis, please perform 24 to 48 before surgery and avoid interfering with dialysis schedule  Testing Considerations       ? Chest X-ray (CXR) t  Consider a Chest X-Ray (CXR) if patient is having respiratory symptoms  Triggered by: Pulmonary nodule seen on imaging study, Shortness of breath         ? Complete Blood Count (CBC) t, client, client  If test was completed and normal within last six months, repeat test is not necessary  Triggered by: Acute renal failure, Cellulitis of forearm, Chronic kidney disease (CKD), stage III (moderate), Kidney problem, Shortness of breath, Age or Facility Rec         ? Comprehensive Metabolic Panel (CMP) t  If test was completed and normal within last six months, repeat test is not necessary  Triggered by: Shortness of breath         ?  Electrocardiogram (ECG) t  Patient does not need new test if normal ECG is present within the last six months and no change in clinical condition  Triggered by: Acute renal failure, Benign essential hypertension, Chronic kidney disease (CKD), stage III (moderate), Kidney problem, Shortness of breath, Age or Facility Rec         ? Hemoglobin A1c (HbA1c) client  If test was completed and normal within the last three months, repeat test is not necessary  Triggered by: Age or Facility Rec         ? Serum Potassium (K) on the morning of surgery t  Triggered by: Acute renal failure, Kidney problem         ? Type and Screen client  Type and Screen - Blood: If there is anticipated or possible large blood loss with this procedure, then a Type and Screen for Blood should be ordered  Triggered by: Age or Facility Rec               Consultations       ? Primary Care Physician Evaluation   Primary care physician may need to evaluate patient prior to surgery  This is likely NOT necessary if the listed conditions are chronic and stable  Triggered by: Acute renal failure, Cellulitis of forearm, Chronic cough, Kidney problem, Pulmonary nodule seen on imaging study, Shortness of breath               Miscellaneous Questions         Question: Are you able to walk up a flight of stairs, walk up a hill or do heavy housework WITHOUT having chest pain or shortness of breath? Answer: YES                   Allergies/Conditions/Medications Not Found        The following were not recognized by our system when generating the recommendations  Please consider if this would impact any preoperative protocols  ? Closed Fracture Of The Distal End Of The Radius       ? Denies Alcohol       ? Drinks coffee       ? Marital History - Currently        ? Never a smoker       ? Shoulder joint pain, unspecified laterality       ?  Omeprazole 20 MG Oral Capsule Delayed Release                  Appointment Information         Date:    12/05/2017        Location:    [place]        Address:           Directions: Footnotes revision 14      ?? Denotes a free-text entry  Legal Disclaimer: Any and all recommendations and services provided herein are designed to assist in the preoperative care of the patient  Nothing contained herein is designed to replace, eliminate or alleviate the responsibility of the attending physician to supervise and determine the patient?s preoperative care and course of treatment  Failure to provide complete, accurate information may negatively impact the system?s ability to recommend the proper preoperative protocol  THE ATTENDING PHYSICIAN IS RESPONSIBLE TO REVIEW THE SUGGESTED PREOPERATIVE PROTOCOLS/COURSE OF TREATMENT AND PRESCRIBE THE FINAL COURSE OF PREOPERATIVE TREATMENT IN CONSULTATION WITH THE PATIENT  THE ePREOP SYSTEM AND ITS MATERIALS ARE PROVIDED ? AS IS? WITHOUT WARRANTY OF ANY KIND, EXPRESS OR IMPLIED, INCLUDING, BUT NOT LIMITED TO, WARRANTIES OF PERFORMANCE OR MERCHANTABILITY OR FITNESS FOR A PARTICULAR PURPOSE  PATIENT AND PHYSICIANS HEREBY AGREE THAT THEIR USE OF THE MATERIALS AND RESOURCES ACT AS A CONSENT TO RELEASE AND WAIVE ePREOP FROM ANY AND ALL CLAIMS OF WARRANTY, TORT OR CONTRACT LAW OF ANY KIND

## 2018-01-12 VITALS
HEIGHT: 68 IN | WEIGHT: 182.5 LBS | DIASTOLIC BLOOD PRESSURE: 84 MMHG | SYSTOLIC BLOOD PRESSURE: 129 MMHG | BODY MASS INDEX: 27.66 KG/M2 | HEART RATE: 69 BPM

## 2018-01-12 NOTE — RESULT NOTES
Verified Results  (1) CBC/ PLT (NO DIFF) [id number] 05:23PM Lovena Slim     Test Name Result Flag Reference   HEMATOCRIT 47 4 %  36 5-49 3   HEMOGLOBIN 15 7 g/dL  12 0-17 0   MCHC 33 1 g/dL  31 4-37 4   MCH 30 0 pg  26 8-34 3   MCV 90 5 fL  82 0-98 0   PLATELET COUNT 126 Thousands/uL  149-390   RBC COUNT 5 24 Million/uL  3 88-5 62   RDW 14 1 %  11 6-15 1   WBC COUNT 7 82 Thousand/uL  4 31-10 16   MPV 11 3 fL  8 9-12 7     (1) PSA (SCREEN) (Dx V76 44 Screen for Prostate Cancer) [id number] 10:38AM Lovena Slim     Test Name Result Flag Reference   PROSTATE SPECIFIC ANTIGEN 0 8 ng/mL  0 0-4 0     (1) COMPREHENSIVE METABOLIC PANEL 80PKZ4673 09:76QL Niyah [facility] Kidney Disease Education Program recommendations are as follows:  GFR calculation is accurate only with a steady state creatinine  Chronic Kidney disease less than 60 ml/min/1 73 sq  meters  Kidney failure less than 15 ml/min/1 73 sq  meters  Test Name Result Flag Reference   GLUCOSE,RANDM 90 mg/dL     If the patient is fasting, the ADA then defines impaired fasting glucose as > 100 mg/dL and diabetes as > or equal to 123 mg/dL     SODIUM 145 mmol/L  136-145   POTASSIUM 4 5 mmol/L  3 5-5 3   CHLORIDE 113 mmol/L H 100-108   CARBON DIOXIDE 26 mmol/L  21-32   ANION GAP (CALC) 6 mmol/L  4-13   BLOOD UREA NITROGEN 30 mg/dL H 5-25   CREATININE 1 75 mg/dL H 0 60-1 30   CALCIUM 8 6 mg/dL  8 3-10 1   BILI, TOTAL 0 35 mg/dL  0 20-1 00   ALK PHOSPHATAS 58 U/L     ALT (SGPT) 25 U/L  12-78   AST(SGOT) 24 U/L  5-45   ALBUMIN 3 4 g/dL L 3 5-5 0   TOTAL PROTEIN 7 2 g/dL  6 4-8 2   eGFR Non-African American 38 4 ml/min/1 73sq m       (1) LIPID PANEL FASTING W DIRECT LDL REFLEX [id number] 09:54AM Lovena Slim   Triglyceride:         Normal              <150 mg/dl       Borderline High    150-199 mg/dl       High               200-499 mg/dl       Very High          >499 mg/dl  Cholesterol:         Desirable        <200 mg/dl      Borderline High 200-239 mg/dl      High             >239 mg/dl  HDL Cholesterol:        High    >59 mg/dL      Low     <41 mg/dL  LDL Cholesterol:        Optimal          <100 mg/dl         Near Optimal     100-129 mg/dl        Above Optimal          Borderline High   130-159 mg/dl          High              160-189 mg/dl          Very High        >189 mg/dl  LDL CALCULATED:    This screening LDL is a calculated result  It does not have the accuracy of the Direct Measured LDL in the monitoring of patients with hyperlipidemia and/or statin therapy  Direct Measure LDL (MCZ805) must be ordered separately in these patients  Test Name Result Flag Reference   CHOLESTEROL 194 mg/dL     LDL CHOLESTEROL CALCULATED 131 mg/dL H 0-100   TRIGLYCERIDES 124 mg/dL  <=150   HDL,DIRECT 38 mg/dL L 40-60     * XR SHOULDER 2+ VIEW RIGHT [id number] 07:54AM Yissellizzjohn Diaz     Test Name Result Flag Reference   XR SHOULDER 2+ VW RIGHT (Report)     RIGHT SHOULDER     INDICATION: Right shoulder pain  COMPARISON: None     VIEWS: 3; 3 images     FINDINGS:     There is no acute fracture or dislocation  Degenerative changes noted across the acromioclavicular and glenohumeral joints  There is superior humeral head migration consistent with chronic rotator cuff tear  No lytic or blastic lesions are seen  Soft tissues are unremarkable  Degenerative change and secondary changes indicating chronic rotator cuff tear  No evidence of acute osseous abnormality  * Shoulder Xray 2> View [id number] 04:55PM Albania Han     Test Name Result Flag Reference   XR Shoulder 2> View (Report)     [address] [facility];;[place];[address]   01/08/2016 1702   01/08/2016 1706   3 VIEWS     LEFT SHOULDER     INDICATION- 24-year-old male with shoulder pain for several months  COMPARISON- 1/20/2015  VIEWS- 3& 3 images^ 3 images     FINDINGS-     There is no acute fracture or dislocation       Elevation of the humeral head with respect to the glenoid fossa   concerning for chronic rotator cuff arthropathy with minimal joint   space narrowing and osteophytosis noted  Acromioclavicular joint   osteoarthritis is stable  No lytic or blastic lesions are seen  Soft tissues are unremarkable  IMPRESSION-     Findings concerning for chronic rotator cuff arthropathy  Stable acromioclavicular joint osteoarthritis         Transcribed on- [id number]     DL Aguilar MD   Reading Radiologist- DL Kapadia MD   Electronically Signed- Alban Kapadia MD   Released Date Time- 01/08/16 1716   ------------------------------------------------------------------------------   12^JONES MONTANEZ   86105^JONES MONTANEZ

## 2018-01-12 NOTE — RESULT NOTES
Verified Results  US KIDNEY AND BLADDER [id number] 02:33PM Lauri Swift     Test Name Result Flag Reference   US KIDNEY AND BLADDER (Report)     RENAL ULTRASOUND     INDICATION: 77-year-old man with acute renal failure  COMPARISON: Renal sonogram from October 25, 2016  TECHNIQUE:  Ultrasound of the retroperitoneum was performed with a curvilinear transducer utilizing volumetric sweeps and still imaging techniques  FINDINGS:     KIDNEYS:     Right kidney: 7 8 x 5 0 cm  Normal echogenicity and contour  Cortical thickness: Normal    No suspicious masses detected  No hydronephrosis  No shadowing calculi  No perinephric fluid collections  Left kidney: 9 7 x 5 6 cm  Normal echogenicity and contour  Cortical thickness: Normal    No suspicious masses detected  Several subcentimeter sized cysts  No hydronephrosis  No shadowing calculi  No perinephric fluid collections  URETERS:   Nonvisualized  BLADDER:    Suboptimal distention  No focal thickening or mass lesions  Ureteral jets not demonstrated  Enlarged prostate gland  IMPRESSION:     1  Moderate right renal atrophy  2  No hydronephrosis or other significant abnormality in either kidney  3  Prostatomegaly          Workstation performed: [id number]     Signed by:   Manuel Duenas MD   2/14/17

## 2018-01-12 NOTE — MISCELLANEOUS
Provider Comments  Provider Comments:   PATIENT WAS A NO SHOW FOR THE APPOINTMENT      Signatures   Electronically signed by : NEETU Samaniego ; Oct 20 2017 12:44PM EST

## 2018-01-13 VITALS
DIASTOLIC BLOOD PRESSURE: 84 MMHG | RESPIRATION RATE: 16 BRPM | WEIGHT: 187.13 LBS | SYSTOLIC BLOOD PRESSURE: 136 MMHG | BODY MASS INDEX: 28.36 KG/M2 | HEIGHT: 68 IN | TEMPERATURE: 96.6 F | HEART RATE: 64 BPM

## 2018-01-13 NOTE — MISCELLANEOUS
Provider Comments  Provider Comments:   PATIENT WAS A NO SHOW TODAY      Signatures   Electronically signed by : Kassandra Ponce, ; Nov 1 2017 10:51AM EST                       (Author)

## 2018-01-13 NOTE — MISCELLANEOUS
Provider Comments  Provider Comments:   pt was a no show for appt today      Signatures   Electronically signed by : Flor Franklin, ; May 22 2017  2:44PM EST                       (Author)

## 2018-01-14 VITALS — SYSTOLIC BLOOD PRESSURE: 148 MMHG | HEART RATE: 66 BPM | DIASTOLIC BLOOD PRESSURE: 89 MMHG

## 2018-01-14 VITALS
RESPIRATION RATE: 14 BRPM | DIASTOLIC BLOOD PRESSURE: 60 MMHG | SYSTOLIC BLOOD PRESSURE: 110 MMHG | BODY MASS INDEX: 29.55 KG/M2 | WEIGHT: 195 LBS | TEMPERATURE: 97.5 F | HEIGHT: 68 IN | HEART RATE: 80 BPM

## 2018-01-14 VITALS
SYSTOLIC BLOOD PRESSURE: 136 MMHG | HEART RATE: 80 BPM | DIASTOLIC BLOOD PRESSURE: 80 MMHG | TEMPERATURE: 96.3 F | WEIGHT: 190.13 LBS | HEIGHT: 68 IN | RESPIRATION RATE: 16 BRPM | BODY MASS INDEX: 28.81 KG/M2

## 2018-01-14 VITALS
WEIGHT: 191 LBS | TEMPERATURE: 95.7 F | HEART RATE: 78 BPM | BODY MASS INDEX: 28.95 KG/M2 | RESPIRATION RATE: 16 BRPM | SYSTOLIC BLOOD PRESSURE: 150 MMHG | DIASTOLIC BLOOD PRESSURE: 98 MMHG | HEIGHT: 68 IN

## 2018-01-14 NOTE — RESULT NOTES
Message   Please call pt and advise increased fluid intake  Verified Results  (1) COMPREHENSIVE METABOLIC PANEL [id number] 86:28LV Jessica Hurst Order Number: [id number]     Test Name Result Flag Reference   GLUCOSE,RANDM 94 mg/dL     If the patient is fasting, the ADA then defines impaired fasting glucose as > 100 mg/dL and diabetes as > or equal to 123 mg/dL  SODIUM 146 mmol/L H 136-145   POTASSIUM 4 4 mmol/L  3 5-5 3   CHLORIDE 112 mmol/L H 100-108   CARBON DIOXIDE 27 mmol/L  21-32   ANION GAP (CALC) 7 mmol/L  4-13   BLOOD UREA NITROGEN 25 mg/dL  5-25   CREATININE 1 75 mg/dL H 0 60-1 30   Standardized to IDMS reference method   CALCIUM 8 9 mg/dL  8 3-10 1   BILI, TOTAL 0 49 mg/dL  0 20-1 00   ALK PHOSPHATAS 51 U/L     ALT (SGPT) 23 U/L  12-78   AST(SGOT) 22 U/L  5-45   ALBUMIN 3 4 g/dL L 3 5-5 0   TOTAL PROTEIN 7 4 g/dL  6 4-8 2   eGFR Non-African American 38 3 ml/min/1 73sq m     [facility] Disease Education Program recommendations are as follows:  GFR calculation is accurate only with a steady state creatinine  Chronic Kidney disease less than 60 ml/min/1 73 sq  meters  Kidney failure less than 15 ml/min/1 73 sq  meters       (1) URINALYSIS w URINE C/S REFLEX (will reflex a microscopy if leukocytes, occult blood, or nitrites are not within normal limits) [id number] 10:55AM Jenna Montoya    Order Number: [id number]     Test Name Result Flag Reference   COLOR Yellow     CLARITY Clear     PH UA 5 5  4 5-8 0   LEUKOCYTE ESTERASE UA Negative  Negative   NITRITE UA Negative  Negative   PROTEIN  (2+) mg/dl A Negative   GLUCOSE UA Negative mg/dl  Negative   KETONES UA Negative mg/dl  Negative   UROBILINOGEN UA 0 2 E U /dl  0 2, 1 0 E U /dl   BILIRUBIN UA Negative  Negative   BLOOD UA Negative  Negative   SPECIFIC GRAVITY UA 1 017  1 003-1 030   BACTERIA None Seen /hpf  None Seen, Occasional   EPITHELIAL CELLS None Seen /hpf  None Seen, Occasional   HYALINE CASTS 3-5 /lpf A None Seen   RBC UA None Seen /hpf  None Seen   WBC UA None Seen /hpf  None Seen     (1) COMPREHENSIVE METABOLIC PANEL [id number] 30:83AB Miranda Govea Order Number: [id number]     Test Name Result Flag Reference   GLUCOSE,RANDM 98 mg/dL     If the patient is fasting, the ADA then defines impaired fasting glucose as > 100 mg/dL and diabetes as > or equal to 123 mg/dL  SODIUM 144 mmol/L  136-145   POTASSIUM 4 8 mmol/L  3 5-5 3   CHLORIDE 111 mmol/L H 100-108   CARBON DIOXIDE 27 mmol/L  21-32   ANION GAP (CALC) 6 mmol/L  4-13   BLOOD UREA NITROGEN 20 mg/dL  5-25   CREATININE 1 60 mg/dL H 0 60-1 30   Standardized to IDMS reference method   CALCIUM 9 2 mg/dL  8 3-10 1   BILI, TOTAL 0 50 mg/dL  0 20-1 00   ALK PHOSPHATAS 54 U/L     ALT (SGPT) 21 U/L  12-78   AST(SGOT) 18 U/L  5-45   ALBUMIN 3 4 g/dL L 3 5-5 0   TOTAL PROTEIN 7 0 g/dL  6 4-8 2   eGFR Non-African American 42 5 ml/min/1 73sq m     [facility] Disease Education Program recommendations are as follows:  GFR calculation is accurate only with a steady state creatinine  Chronic Kidney disease less than 60 ml/min/1 73 sq  meters  Kidney failure less than 15 ml/min/1 73 sq  meters

## 2018-01-15 NOTE — MISCELLANEOUS
Provider Comments  Provider Comments:   pt no showed today      Signatures   Electronically signed by : Avni Mares, ; Jan 19 2017  5:12PM EST                       (Author)

## 2018-01-16 ENCOUNTER — GENERIC CONVERSION - ENCOUNTER (OUTPATIENT)
Dept: OTHER | Facility: OTHER | Age: 78
End: 2018-01-16

## 2018-01-16 ENCOUNTER — APPOINTMENT (OUTPATIENT)
Dept: LAB | Facility: HOSPITAL | Age: 78
End: 2018-01-16
Attending: INTERNAL MEDICINE
Payer: COMMERCIAL

## 2018-01-16 ENCOUNTER — TRANSCRIBE ORDERS (OUTPATIENT)
Dept: LAB | Facility: HOSPITAL | Age: 78
End: 2018-01-16

## 2018-01-16 ENCOUNTER — APPOINTMENT (OUTPATIENT)
Dept: PHYSICAL THERAPY | Age: 78
End: 2018-01-16
Payer: COMMERCIAL

## 2018-01-16 DIAGNOSIS — I10 ESSENTIAL (PRIMARY) HYPERTENSION: ICD-10-CM

## 2018-01-16 LAB
ANION GAP SERPL CALCULATED.3IONS-SCNC: 6 MMOL/L (ref 4–13)
BUN SERPL-MCNC: 24 MG/DL (ref 5–25)
CALCIUM SERPL-MCNC: 8.9 MG/DL (ref 8.3–10.1)
CHLORIDE SERPL-SCNC: 108 MMOL/L (ref 100–108)
CO2 SERPL-SCNC: 29 MMOL/L (ref 21–32)
CREAT SERPL-MCNC: 1.44 MG/DL (ref 0.6–1.3)
GFR SERPL CREATININE-BSD FRML MDRD: 47 ML/MIN/1.73SQ M
GLUCOSE P FAST SERPL-MCNC: 83 MG/DL (ref 65–99)
POTASSIUM SERPL-SCNC: 3.9 MMOL/L (ref 3.5–5.3)
PTH-INTACT SERPL-MCNC: 79.9 PG/ML (ref 14–72)
SODIUM SERPL-SCNC: 143 MMOL/L (ref 136–145)

## 2018-01-16 PROCEDURE — 36415 COLL VENOUS BLD VENIPUNCTURE: CPT

## 2018-01-16 PROCEDURE — 83970 ASSAY OF PARATHORMONE: CPT

## 2018-01-16 PROCEDURE — 80048 BASIC METABOLIC PNL TOTAL CA: CPT

## 2018-01-16 PROCEDURE — 97110 THERAPEUTIC EXERCISES: CPT

## 2018-01-16 NOTE — MISCELLANEOUS
Provider Comments  Provider Comments:   pt no showed today      Signatures   Electronically signed by : Marco Franks, ; Jul 11 2017  2:56PM EST                       (Author)

## 2018-01-17 NOTE — MISCELLANEOUS
Provider Comments  Provider Comments:   pt no showed today      Signatures   Electronically signed by : Avelina Castle, ; Aug 12 2016  5:06PM EST                       (Author)    Electronically signed by : NEETU Pritchard ; Aug 14 2016  5:14PM EST

## 2018-01-18 ENCOUNTER — APPOINTMENT (OUTPATIENT)
Dept: PHYSICAL THERAPY | Age: 78
End: 2018-01-18
Payer: COMMERCIAL

## 2018-01-18 PROCEDURE — 97140 MANUAL THERAPY 1/> REGIONS: CPT

## 2018-01-18 PROCEDURE — 97110 THERAPEUTIC EXERCISES: CPT

## 2018-01-23 ENCOUNTER — OFFICE VISIT (OUTPATIENT)
Dept: PHYSICAL THERAPY | Age: 78
End: 2018-01-23
Payer: COMMERCIAL

## 2018-01-23 VITALS
SYSTOLIC BLOOD PRESSURE: 155 MMHG | WEIGHT: 186.25 LBS | HEIGHT: 68 IN | HEART RATE: 66 BPM | BODY MASS INDEX: 28.23 KG/M2 | DIASTOLIC BLOOD PRESSURE: 90 MMHG

## 2018-01-23 VITALS
WEIGHT: 184 LBS | HEART RATE: 80 BPM | TEMPERATURE: 97 F | HEIGHT: 68 IN | BODY MASS INDEX: 27.89 KG/M2 | DIASTOLIC BLOOD PRESSURE: 80 MMHG | SYSTOLIC BLOOD PRESSURE: 120 MMHG | RESPIRATION RATE: 16 BRPM

## 2018-01-23 PROCEDURE — 97110 THERAPEUTIC EXERCISES: CPT

## 2018-01-23 PROCEDURE — 97140 MANUAL THERAPY 1/> REGIONS: CPT

## 2018-01-23 NOTE — RESULT NOTES
Verified Results  * XR SHOULDER 2+ VIEW RIGHT [id number] 02:00PM Nancy Richmond Order Number: [id number]   Performing Comments: waiting room to room 6     Test Name Result Flag Reference   XR SHOULDER 2+ VW RIGHT (Report)     RIGHT SHOULDER     INDICATION: Evaluate right shoulder replacement     COMPARISON: 12/5/2017     VIEWS: 2     IMAGES: 2     FINDINGS:     There is no acute fracture or dislocation  The shoulder prosthesis appears satisfactory in alignment  No lucency identified around the hardware components  No lytic or blastic lesions are seen  There are skin staples noted  There is some soft tissue gas related to the relatively recent surgery  IMPRESSION:     Satisfactory alignment and overall appearance of prosthetic right shoulder         Workstation performed: [id number]     Signed by:   Jovanny Myers MD   12/26/17

## 2018-01-23 NOTE — PROGRESS NOTES
Assessment   1  Abnormal TSH (790 6) (R94 6)   2  Benign essential hypertension (401 1) (I10)    Plan   Abnormal TSH    · (1) TSH WITH FT4 REFLEX; Status:Active; Requested RSV:[id number]; Discussion/Summary      79 y/o M presents to the office for a follow up on   HTN in the setting of CKD: Controlled on Losartan 100mg daily  Nephrology currently following  Abnormal TSH: Currently asymptomatic  Will repeat TSH  in 6 months  Chief Complaint   Here for a follow up on CKD, BPH,HTN      History of Present Illness   79 y/o M with sig pmHX of HTN, CKD w/ chronic lower extrem weakness presents to the clinic for a follow up on chronic conditions  Urinates 4-5 nights  Stops drinking water after 6 pm  Patient dx per Nephrology  Has upcoming appoinment with urology  Not currently on any medications Blood pressure controlled at home  Denies any SOB, CP, and dizziness  Currently started on Losartan 100mg daily, and discontinues amlodipine for leg swelling  Being managed by nephrology, the patient continues to take precaution with salt intake, and avoiding NSAIDS  Lower extrem swelling improving with discontinuation of amlodipine      Review of Systems        Constitutional: not feeling poorly  ENT: no sore throat  Cardiovascular: no chest pain-- and-- no palpitations  Respiratory: no shortness of breath  Gastrointestinal: no abdominal pain  Genitourinary: frequency, but-- no dysuria  Musculoskeletal: limb swelling-- and-- lower extrem improving  Active Problems   1  Abdominal distention (787 3) (R14 0)   2  Abdominal pain (789 00) (R10 9)   3  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)   4  Acute renal failure (584 9) (N17 9)   5  Aftercare following surgery of the musculoskeletal system (V58 78) (Z47 89)   6  Back pain (724 5) (M54 9)   7  Benign essential hypertension (401 1) (I10)   8  Benign localized hyperplasia of prostate (600 20) (N40 0)   9   Bilateral edema of lower extremity (782 3) (R60 0)   10  Cellulitis of forearm (682 3) (L03 119)   11  Chronic cough (786 2) (R05)   12  Chronic kidney disease (CKD), stage III (moderate) (585 3) (N18 3)   13  Chronic pain of both knees (717 59,649 74) (M25 561,M25 562,G89 29)   14  Chronic pain of both shoulders (719 41,338 29) (M25 511,M25 512,G89 29)   15  Closed Fracture Of The Distal End Of The Radius (813 42)   16  Colon cancer screening (V76 51) (Z12 11)   17  Cyst of joint of shoulder (719 81) (M25 819)   18  Itching (698 9) (L29 9)   19  Joint pain, knee (719 46) (M25 569)   20  Kidney problem (593 9) (N28 9)   21  Left forearm pain (729 5) (M79 632)   22  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 (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) (M19 211)   23  Need for immunization against influenza (V04 81) (Z23)   24  Overweight (278 02) (E66 3)   25  Poison ivy dermatitis (692 6) (L23 7)   26  Preoperative clearance (V72 84) (Z01 818)   27  Primary localized osteoarthritis of both knees (715 16) (M17 0)   28  Primary osteoarthritis of left knee (715 16) (M17 12)   29  Pulmonary nodule seen on imaging study (793 11) (R91 1)   30  Renal function test abnormal (794 4) (R94 4)   31  Right knee pain (719 46) (M25 561)   32  Rotator cuff tear, right (840 4) (M75 101)   33  Shortness of breath (786 05) (R06 02)   34  Shoulder joint pain, unspecified laterality   35  Shoulder pain, right (719 41) (M25 511)    Past Medical History   1  History of Arthritis (V13 4)     The active problems and past medical history were reviewed and updated today  Surgical History   1  History of Appendectomy   2  History of Colonoscopy   3  History of Shoulder Surgery   4  History of Wrist Surgery     The surgical history was reviewed and updated today  Family History   Father    1  Family history of Hypertension (V17 49)  Family History    2  Family history of arthritis (V17 7) (Z82 61)     The family history was reviewed and updated today         Social History    · Denied: Alcohol   · Daily caffeine consumption, 1 serving a day   · Drinks coffee   · Denied: History of Drug use   · Former smoker (V15 82) (R90 195)   · Marital History - Currently    · Never a smoker  The social history was reviewed and updated today  Current Meds    1  Losartan Potassium 100 MG Oral Tablet; TAKE 1 TABLET DAILY; Therapy: 46XHD0253 to (Evaluate:03Jan2019)  Requested for: 45YXZ1997; Last     Rx:08Jan2018 Ordered     The medication list was reviewed and updated today  Allergies   1  No Known Drug Allergies  2  No Known Environmental Allergies   3  No Known Food Allergies    Vitals   Vital Signs    Recorded: 45HDH0901 02:08PM   Temperature 97 F   Heart Rate 80   Respiration 16   Systolic 448   Diastolic 80   Height 5 ft 8 in   Weight 184 lb    BMI Calculated 27 98   BSA Calculated 1 97     Physical Exam        Constitutional      General appearance: No acute distress, well appearing and well nourished  Eyes      Conjunctiva and lids: No swelling, erythema, or discharge  Ears, Nose, Mouth, and Throat      External inspection of ears and nose: Normal        Otoscopic examination: Tympanic membrance translucent with normal light reflex  Canals patent without erythema  Nasal mucosa, septum, and turbinates: Normal without edema or erythema  Oropharynx: Normal with no erythema, edema, exudate or lesions  Pulmonary      Respiratory effort: No increased work of breathing or signs of respiratory distress  Auscultation of lungs: Clear to auscultation, equal breath sounds bilaterally, no wheezes, no rales, no rhonci  Cardiovascular      Auscultation of heart: Normal rate and rhythm, normal S1 and S2, without murmurs  Examination of extremities for edema and/or varicosities: Normal        Carotid pulses: Normal        Abdomen      Abdomen: Non-tender, no masses  Liver and spleen: No hepatomegaly or splenomegaly  Lymphatic      Palpation of lymph nodes in neck: No lymphadenopathy  Musculoskeletal      Gait and station: Normal        Digits and nails: Normal without clubbing or cyanosis  Inspection/palpation of joints, bones, and muscles: Abnormal  -- b/l pedal trace edema  Skin      Skin and subcutaneous tissue: Normal without rashes or lesions  Psychiatric      Orientation to person, place and time: Normal        Mood and affect: Normal           Attending Note   Attending Note: I discussed the case with the Resident and reviewed the Resident's note,-- I supervised the Resident-- and-- I agree with the Resident management plan as it was presented to me  Level of Participation: I was present in clinic, but did not examine the patient  I agree with the Resident's note        Future Appointments      Date/Time Provider Specialty Site   02/19/2018 01:40 PM Beverly Constantino, [address] [place] [address] 1 [facility]     Signatures    Electronically signed by : NEETU Gonzalez ; Hector 10 2018 11:56AM EST                       (Author)     Electronically signed by : Lucita Kim DO; Jan 22 2018  1:48PM EST                       (Author)

## 2018-01-23 NOTE — MISCELLANEOUS
Message   Recorded as Task   Date: 01/16/2018 01:56 PM, Created By: Rai Abbott   Task Name: Miscellaneous   Assigned To: Margret Posadas   Regarding Patient: Stephanie Palmer, Status: Active   CommentCarmina Hum - 16 Jan 2018 1:56 PM     TASK CREATED  Repeat blood test reviewed, renal function fairly stable after increasing dose of losartan  I spoke with [facility] about recent results, recommend to continue with same medication for now, follow blood test in 6 months (renal function panel, PTH and UPC) and if stable follow back in our office in 1 year from last office visit  Gisel please mail order for repeat blood test in 6 months, thanks,    GC     Labs were sent to the patient for July AG      Active Problems    1  Abdominal distention (787 3) (R14 0)   2  Abdominal pain (789 00) (R10 9)   3  Abnormal TSH (790 6) (R94 6)   4  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)   5  Acute renal failure (584 9) (N17 9)   6  Aftercare following surgery of the musculoskeletal system (V58 78) (Z47 89)   7  Back pain (724 5) (M54 9)   8  Benign essential hypertension (401 1) (I10)   9  Benign localized hyperplasia of prostate (600 20) (N40 0)   10  Bilateral edema of lower extremity (782 3) (R60 0)   11  Cellulitis of forearm (682 3) (L03 119)   12  Chronic cough (786 2) (R05)   13  Chronic kidney disease (CKD), stage III (moderate) (585 3) (N18 3)   14  Chronic pain of both knees (191 22,210 66) (M25 561,M25 562,G89 29)   15  Chronic pain of both shoulders (719 41,338 29) (M25 511,M25 512,G89 29)   16  Closed Fracture Of The Distal End Of The Radius (813 42)   17  Colon cancer screening (V76 51) (Z12 11)   18  Cyst of joint of shoulder (719 81) (M25 819)   19  Itching (698 9) (L29 9)   20  Joint pain, knee (719 46) (M25 569)   21  Kidney problem (593 9) (N28 9)   22  Left forearm pain (729 5) (M79 632)   23  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 (715 21) (A93 726)   24   Need for immunization against influenza (V04 81) (Z23)   25  Overweight (278 02) (E66 3)   26  Poison ivy dermatitis (692 6) (L23 7)   27  Preoperative clearance (V72 84) (Z01 818)   28  Primary localized osteoarthritis of both knees (715 16) (M17 0)   29  Primary osteoarthritis of left knee (715 16) (M17 12)   30  Pulmonary nodule seen on imaging study (793 11) (R91 1)   31  Renal function test abnormal (794 4) (R94 4)   32  Right knee pain (719 46) (M25 561)   33  Rotator cuff tear, right (840 4) (M75 101)   34  Shortness of breath (786 05) (R06 02)   35  Shoulder joint pain, unspecified laterality   36  Shoulder pain, right (719 41) (M25 511)    Current Meds   1  Losartan Potassium 100 MG Oral Tablet; TAKE 1 TABLET DAILY; Therapy: 49KEU3910 to (Evaluate:03Jan2019)  Requested for: 95IWU4633; Last   Rx:08Jan2018 Ordered    Allergies    1  No Known Drug Allergies    2  No Known Environmental Allergies   3  No Known Food Allergies    Plan  Benign essential hypertension, Chronic kidney disease (CKD), stage III (moderate)    · (1) PTH N-TERMINAL (INTACT); Status:Active - Retrospective By Protocol Authorization; Requested for:81Zkg9341;    · (1) URINE PROTEIN CREATININE RATIO; Status:Active - Retrospective By Protocol  Authorization; Requested for:13Jue7138;   Chronic kidney disease (CKD), stage III (moderate)    · (1) RENAL FUNCTION PANEL; Status:Active - Retrospective By Protocol Authorization;   Requested for:24Ywt0537;     Signatures   Electronically signed by : NEETU Butler ; Jan 16 2018  2:29PM EST

## 2018-01-25 ENCOUNTER — OFFICE VISIT (OUTPATIENT)
Dept: PHYSICAL THERAPY | Age: 78
End: 2018-01-25
Payer: COMMERCIAL

## 2018-01-25 DIAGNOSIS — M19.211 SECONDARY OSTEOARTHRITIS OF RIGHT SHOULDER: Primary | ICD-10-CM

## 2018-01-25 PROCEDURE — 97110 THERAPEUTIC EXERCISES: CPT | Performed by: PHYSICAL THERAPIST

## 2018-01-25 PROCEDURE — 97140 MANUAL THERAPY 1/> REGIONS: CPT | Performed by: PHYSICAL THERAPIST

## 2018-01-25 NOTE — PROGRESS NOTES
Daily Note     Today's date: 2018  Patient name: Xander Mejia  : 1940  MRN: [id number]  Referring provider: Rizwan Batista MD  Dx:   Encounter Diagnosis   Name Primary?  Secondary osteoarthritis of right shoulder Yes                  Subjective: Patient denies right shoulder pain, only fatigue with elevated right ue functional activities  Objective: See treatment diary below      Assessment: Tolerated treatment well  Patient exhibited good technqiue with therapeutic exercises  Patient presents with any and all elevated ue therapeutic exercise mimicking his functional deficits due to fatigue, not pain production  Plan: Continue per plan of care  Precautions: right shoulder replacement occurred on 17  HTN  AROM on 18  Strengthening  18  Daily Treatment Diary     Manual              Prom right shoulder 10 min                                                                    Exercise Diary              Pulleys 5 min            UBE: F/B 10 min            Scapular squeezes 3 x 10            Wall slides flexion and scaption 3 x 10            Shoulder scaption:B 3 x 10            Shoulder flexion:B: 3 x 10            Biceps curls:B: 3 # x 30            Shoulder IR and ER arom with upper arm adducted R: 3 x 10            Body Blade:B: hands at waist, 90 and 125 degrees   30 sec x 2            Standing and supine shoulder flexion 3 x 10            Supine triceps and scap punches:R 3 x 10            Left s/l right shoulder ER and abduction 3 x 10            Prone shoulder hughston's 1,2 and 5 3 x 10                                                                                                           Modalities              CP to right shoulder with pt seated after PT treatment

## 2018-01-26 DIAGNOSIS — I10 ESSENTIAL HYPERTENSION: Primary | ICD-10-CM

## 2018-01-27 RX ORDER — AMLODIPINE BESYLATE 10 MG/1
TABLET ORAL
Qty: 90 TABLET | Refills: 3 | Status: SHIPPED | OUTPATIENT
Start: 2018-01-27 | End: 2018-02-06 | Stop reason: SDUPTHER

## 2018-01-30 ENCOUNTER — OFFICE VISIT (OUTPATIENT)
Dept: PHYSICAL THERAPY | Age: 78
End: 2018-01-30
Payer: COMMERCIAL

## 2018-01-30 DIAGNOSIS — M19.211 SECONDARY OSTEOARTHRITIS OF RIGHT SHOULDER: Primary | ICD-10-CM

## 2018-01-30 PROCEDURE — 97110 THERAPEUTIC EXERCISES: CPT

## 2018-01-30 PROCEDURE — 97140 MANUAL THERAPY 1/> REGIONS: CPT

## 2018-01-30 NOTE — PROGRESS NOTES
Daily Note     Today's date: 2018  Patient name: Fiorella Mckeon  : 1940  MRN: [id number]  Referring provider: Charles Maza MD  Dx:   Encounter Diagnosis   Name Primary?  Secondary osteoarthritis of right shoulder Yes                  Subjective: Pt reported no pain only weakness overtime  Objective: See treatment diary below      Assessment: Progress as per Protocol  Presents with weakness otherwise tolerated session well  Plan: Continue per plan of care  Precautions: right shoulder replacement occurred on 17  HTN  AROM on 18  Strengthening  18  Daily Treatment Diary     Manual             Prom right shoulder 10 min 10 min                                                                    Exercise Diary             Pulleys 5 min NT            UBE: F/B 10 min 10 min            Scapular squeezes 3 x 10 30x           Wall slides flexion and scaption 3 x 10 20x 3sec            Shoulder scaption:B 3 x 10 30x           Shoulder flexion:B: 3 x 10 30x           Biceps curls:B: 3 # x 30 3# 30x           Shoulder IR and ER arom with upper arm adducted R: 3 x 10 NT            Body Blade:B: hands at waist, 90 and 125 degrees   30 sec x 2 NT           Standing and supine shoulder flexion 3 x 10 30x            Supine triceps and scap punches:R 3 x 10            Left s/l right shoulder ER and abduction 3 x 10            Prone shoulder hughston's 1,2 and 5 3 x 10 NT                                                                                                          Modalities             CP to right shoulder with pt seated after PT treatment 10 min  10 min

## 2018-02-01 ENCOUNTER — OFFICE VISIT (OUTPATIENT)
Dept: PHYSICAL THERAPY | Age: 78
End: 2018-02-01
Payer: COMMERCIAL

## 2018-02-01 DIAGNOSIS — M19.211 SECONDARY OSTEOARTHRITIS OF RIGHT SHOULDER: Primary | ICD-10-CM

## 2018-02-01 PROCEDURE — 97140 MANUAL THERAPY 1/> REGIONS: CPT | Performed by: PHYSICAL THERAPIST

## 2018-02-01 PROCEDURE — 97110 THERAPEUTIC EXERCISES: CPT | Performed by: PHYSICAL THERAPIST

## 2018-02-01 PROCEDURE — G8985 CARRY GOAL STATUS: HCPCS | Performed by: PHYSICAL THERAPIST

## 2018-02-01 PROCEDURE — G8984 CARRY CURRENT STATUS: HCPCS | Performed by: PHYSICAL THERAPIST

## 2018-02-01 NOTE — LETTER
2018    MD Oliverio Mckeon [id number]    Patient: Derik Ha   YOB: 1940   Date of Visit: 2018       Dear Dr Sarabia Pi:    Please review the attached summary of Dirk Cool progress and our plan for continued therapy, and verify that you agree therapy should continue by signing the attached document and sending it back to our office  If you have any questions or concerns, please don't hesitate to call  Sincerely,      Alessandra Caldwell, PT        Referring Provider:      Based upon review of the patient's progress and continued therapy plan, it is my medical opinion that [facility] should continue physical therapy treatment at the [facility]:                    MD Oliverio Mckeon [address]  VIA In Basket          Daily Note     Today's date: 2018  Patient name: Derik Ha  : 1940  MRN: [id number]  Referring provider: Laurel Wolfe MD  Dx:   Encounter Diagnosis   Name Primary?  Secondary osteoarthritis of right shoulder Yes                  Subjective: Pt reported feeling good today  Objective: See treatment diary below      Assessment: See PT re-eval for details       Plan: Continue per plan of care  Precautions: right shoulder replacement occurred on 17  HTN  AROM on 18  Strengthening  18      Daily Treatment Diary     Manual            Prom right shoulder 10 min 10 min  10 min                                                                   Exercise Diary            Pulleys 5 min NT  4min           UBE: F/B 10 min 10 min  10 min           Scapular squeezes 3 x 10 30x 30x          Wall slides flexion and scaption 3 x 10 20x 3sec  20x 3sec           Shoulder scaption:B 3 x 10 30x 30x          Shoulder flexion:B: 3 x 10 30x 30x          Biceps curls:B: 3 # x 30 3# 30x 4# 30x           Shoulder IR and ER arom with upper arm adducted R: 3 x 10 NT  NT           Body Blade:B: hands at waist, 90 and 125 degrees  30 sec x 2 NT 30sec  5x           Standing and supine shoulder flexion 3 x 10 30x  30x          Supine triceps and scap punches:R 3 x 10  30x          Left s/l right shoulder ER and abduction 3 x 10  30x          Prone shoulder hughston's 1,2 and 5 3 x 10 NT 30x                                                                                                         Modalities            CP to right shoulder with pt seated after PT treatment 10 min  10 min  10 min                                                   PT Re-Evaluation     Today's date: 2018  Patient name: Harsha Bronson  : 1940  MRN: [id number]  Referring provider: Rosaura Wilson MD  Dx:   Encounter Diagnosis   Name Primary?  Secondary osteoarthritis of right shoulder Yes       Start Time: 1100  Stop Time: 1200  Total time in clinic (min): 60 minutes    Assessment  Impairments: abnormal or restricted ROM and impaired physical strength    Assessment details: Patient seen for PT reassessment  Patient reported the following progress: decrease in right shoulder pain, increase in right shoulder mobility and strength and functional progress  Patient noted weakness and fatigue persist as his primary deficit limiting all functional activities  Understanding of Dx/Px/POC: excellentPrognosis details: Patient presents with the following progress since onset of PT : decrease in right shoulder pain, increase in right ue rom and strength, and functional deficits  But, he continues to present with the following deficits that still persist and limit all iadls and functional activities: right GH joint pain, decrease in right ue rom and strength and postural dysfunction  Goals  Short Term goals - 4 weeks  1  Patient will be independent HEP  2   Patient will report a 25 - 50% decrease in pain complaints    3   Increase strength 1/2 grade   4   Increase ROM 5-10 degrees  Long Term goals - 8 weeks  1  Patient will report elimination of pain complaints  2   Patient will return to all work related activities without restriction  3   Patient will return to all recreational activities without restriction  4   ROM WFL  5   Strength   Plan  Patient would benefit from: skilled PT  Planned modality interventions: cryotherapy, TENS and thermotherapy: hydrocollator packs  Planned therapy interventions: manual therapy, motor coordination training, neuromuscular re-education, patient education, strengthening, stretching, therapeutic activities, therapeutic exercise, functional ROM exercises, therapeutic training and home exercise program  Frequency: 2x week  Duration in weeks: 8  Treatment plan discussed with: patient        Subjective Evaluation    Pain  Current pain ratin  At best pain ratin  At worst pain ratin  Progression: improved    Treatments  Previous treatment: physical therapy  Patient Goals  Patient goals for therapy: increased motion and increased strength          Objective     Active Range of Motion     Right Shoulder   Flexion: 152 degrees   Abduction: 154 degrees   External rotation 90°:  92 degrees  Internal rotation 0°:  66 degrees     Strength/Myotome Testing     Right Shoulder     Planes of Motion   Flexion: 4   Abduction: 4   External rotation at 0°:  4   Internal rotation at 0°:  4-     Right Elbow   Flexion: 4+  Extension: 4      Precautions: Right [address] Joint AROM until 18 then strengthening  Precautions: right shoulder replacement occurred on 17  HTN  AROM on 18    Strengthening  18      Daily Treatment Diary      Manual                   Prom right shoulder 10 min 10 min  10 min                                                                                                                        Exercise Diary                   Pulleys 5 min NT  4min                  UBE: F/B 10 min 10 min  10 min                  Scapular squeezes 3 x 10 30x 30x                 Wall slides flexion and scaption 3 x 10 20x 3sec  20x 3sec                  Shoulder scaption:B 3 x 10 30x 30x                 Shoulder flexion:B: 3 x 10 30x 30x                 Biceps curls:B: 3 # x 30 3# 30x 4# 30x                  Shoulder IR and ER arom with upper arm adducted R: 3 x 10 NT  NT                  Body Blade:B: hands at waist, 90 and 125 degrees   30 sec x 2 NT 30sec  5x                  Standing and supine shoulder flexion 3 x 10 30x  30x                 Supine triceps and scap punches:R 3 x 10   30x                 Left s/l right shoulder ER and abduction 3 x 10   30x                 Prone shoulder hughston's 1,2 and 5 3 x 10 NT 30x                                                                                                                                                                                               Modalities  1/25 1/30 2/1                 CP to right shoulder with pt seated after PT treatment 10 min  10 min  10 min                                                                              Flowsheet Rows    Flowsheet Row Most Recent Value   PT/OT G-Codes   Current Score  87   Projected Score  73   FOTO information reviewed  Yes   Assessment Type  Re-evaluation   G code set  Carrying, Moving & Handling Objects   Carrying, Moving and Handling Objects Current Status ()  CI   Carrying, Moving and Handling Objects Goal Status ()  CI

## 2018-02-01 NOTE — PROGRESS NOTES
Daily Note     Today's date: 2018  Patient name: Lilliana Tristan  : 1940  MRN: [id number]  Referring provider: Elisa Sosa MD  Dx:   Encounter Diagnosis   Name Primary?  Secondary osteoarthritis of right shoulder Yes                  Subjective: Pt reported feeling good today  Objective: See treatment diary below      Assessment: See PT re-eval for details       Plan: Continue per plan of care  Precautions: right shoulder replacement occurred on 17  HTN  AROM on 18  Strengthening  18  Daily Treatment Diary     Manual            Prom right shoulder 10 min 10 min  10 min                                                                   Exercise Diary            Pulleys 5 min NT  4min           UBE: F/B 10 min 10 min  10 min           Scapular squeezes 3 x 10 30x 30x          Wall slides flexion and scaption 3 x 10 20x 3sec  20x 3sec           Shoulder scaption:B 3 x 10 30x 30x          Shoulder flexion:B: 3 x 10 30x 30x          Biceps curls:B: 3 # x 30 3# 30x 4# 30x           Shoulder IR and ER arom with upper arm adducted R: 3 x 10 NT  NT           Body Blade:B: hands at waist, 90 and 125 degrees   30 sec x 2 NT 30sec  5x           Standing and supine shoulder flexion 3 x 10 30x  30x          Supine triceps and scap punches:R 3 x 10  30x          Left s/l right shoulder ER and abduction 3 x 10  30x          Prone shoulder hughston's 1,2 and 5 3 x 10 NT 30x                                                                                                         Modalities            CP to right shoulder with pt seated after PT treatment 10 min  10 min  10 min

## 2018-02-01 NOTE — PROGRESS NOTES
PT Re-Evaluation     Today's date: 2018  Patient name: Mathew Martínez  : 1940  MRN: [id number]  Referring provider: Shereen Garcia MD  Dx:   Encounter Diagnosis   Name Primary?  Secondary osteoarthritis of right shoulder Yes       Start Time: 1100  Stop Time: 1200  Total time in clinic (min): 60 minutes    Assessment  Impairments: abnormal or restricted ROM and impaired physical strength    Assessment details: Patient seen for PT reassessment  Patient reported the following progress: decrease in right shoulder pain, increase in right shoulder mobility and strength and functional progress  Patient noted weakness and fatigue persist as his primary deficit limiting all functional activities  Understanding of Dx/Px/POC: excellentPrognosis details: Patient presents with the following progress since onset of PT : decrease in right shoulder pain, increase in right ue rom and strength, and functional deficits  But, he continues to present with the following deficits that still persist and limit all iadls and functional activities: right GH joint pain, decrease in right ue rom and strength and postural dysfunction  Goals  Short Term goals - 4 weeks  1  Patient will be independent HEP  2   Patient will report a 25 - 50% decrease in pain complaints  3   Increase strength 1/2 grade  4   Increase ROM 5-10 degrees  Long Term goals - 8 weeks  1  Patient will report elimination of pain complaints  2   Patient will return to all work related activities without restriction  3   Patient will return to all recreational activities without restriction  4   ROM WFL  5   Strength 5/5      Plan  Patient would benefit from: skilled PT  Planned modality interventions: cryotherapy, TENS and thermotherapy: hydrocollator packs  Planned therapy interventions: manual therapy, motor coordination training, neuromuscular re-education, patient education, strengthening, stretching, therapeutic activities, therapeutic exercise, functional ROM exercises, therapeutic training and home exercise program  Frequency: 2x week  Duration in weeks: 8  Treatment plan discussed with: patient        Subjective Evaluation    Pain  Current pain ratin  At best pain ratin  At worst pain ratin  Progression: improved    Treatments  Previous treatment: physical therapy  Patient Goals  Patient goals for therapy: increased motion and increased strength          Objective     Active Range of Motion     Right Shoulder   Flexion: 152 degrees   Abduction: 154 degrees   External rotation 90°: 92 degrees  Internal rotation 0°: 66 degrees     Strength/Myotome Testing     Right Shoulder     Planes of Motion   Flexion: 4   Abduction: 4   External rotation at 0°: 4   Internal rotation at 0°: 4-     Right Elbow   Flexion: 4+  Extension: 4      Precautions: Right [address] Joint AROM until 18 then strengthening  Precautions: right shoulder replacement occurred on 17  HTN  AROM on 18  Strengthening  18      Daily Treatment Diary      Manual                   Prom right shoulder 10 min 10 min  10 min                                                                                                                        Exercise Diary                   Pulleys 5 min NT  4min                  UBE: F/B 10 min 10 min  10 min                  Scapular squeezes 3 x 10 30x 30x                 Wall slides flexion and scaption 3 x 10 20x 3sec  20x 3sec                  Shoulder scaption:B 3 x 10 30x 30x                 Shoulder flexion:B: 3 x 10 30x 30x                 Biceps curls:B: 3 # x 30 3# 30x 4# 30x                  Shoulder IR and ER arom with upper arm adducted R: 3 x 10 NT  NT                  Body Blade:B: hands at waist, 90 and 125 degrees   30 sec x 2 NT 30sec  5x                  Standing and supine shoulder flexion 3 x 10 30x  30x                 Supine triceps and scap punches:R 3 x 10   30x                 Left s/l right shoulder ER and abduction 3 x 10   30x                 Prone shoulder hughston's 1,2 and 5 3 x 10 NT 30x                                                                                                                                                                                               Modalities  1/25 1/30 2/1                 CP to right shoulder with pt seated after PT treatment 10 min  10 min  10 min                                                                              Flowsheet Rows    Flowsheet Row Most Recent Value   PT/OT G-Codes   Current Score  87   Projected Score  73   FOTO information reviewed  Yes   Assessment Type  Re-evaluation   G code set  Carrying, Moving & Handling Objects   Carrying, Moving and Handling Objects Current Status ()  CI   Carrying, Moving and Handling Objects Goal Status ()  CI

## 2018-02-02 NOTE — PROGRESS NOTES
I have reviewed the notes, assessments, and/or procedures performed by Sherly Caldwell PT, I concur with her/his documentation of Oliver Sharpe

## 2018-02-06 ENCOUNTER — OFFICE VISIT (OUTPATIENT)
Dept: PHYSICAL THERAPY | Age: 78
End: 2018-02-06
Payer: COMMERCIAL

## 2018-02-06 DIAGNOSIS — M19.211 SECONDARY OSTEOARTHRITIS OF RIGHT SHOULDER: Primary | ICD-10-CM

## 2018-02-06 DIAGNOSIS — I10 ESSENTIAL HYPERTENSION: ICD-10-CM

## 2018-02-06 PROCEDURE — 97110 THERAPEUTIC EXERCISES: CPT

## 2018-02-06 PROCEDURE — 97140 MANUAL THERAPY 1/> REGIONS: CPT

## 2018-02-06 NOTE — PROGRESS NOTES
Daily Note     Today's date: 2018  Patient name: Gab Spear  : 1940  MRN: [id number]  Referring provider: Kyra Rutledge MD  Dx:   Encounter Diagnosis   Name Primary?  Secondary osteoarthritis of right shoulder Yes                  Subjective: Pt reported feeling good today  Objective: See treatment diary below      Assessment: Tolerated strengthening phase well  Progress as per protocol  1:1 time Alicia Osborne, PT time       Plan: Continue per plan of care  Precautions:     Daily Treatment Diary     Manual           Prom right shoulder 10 min 10 min  10 min  10 min                                                                  Exercise Diary           Pulleys 5 min NT  4min  NT          UBE: F/B 10 min 10 min  10 min  10 min          Scapular squeezes 3 x 10 30x 30x NT          Wall slides flexion and scaption 3 x 10 20x 3sec  20x 3sec  20x 3sec          Shoulder scaption:B 3 x 10 30x 30x 2# 20x          Shoulder flexion:B: 3 x 10 30x 30x 2# 20x          Biceps curls:B: 3 # x 30 3# 30x 4# 30x  4# 20x          Shoulder IR and ER arom with upper arm adducted R: 3 x 10 NT  NT  NT          Body Blade:B: hands at waist, 90 and 125 degrees   30 sec x 2 NT 30sec  5x  NT          Standing and supine shoulder flexion 3 x 10 30x  30x 30x 2#         Supine triceps and scap punches:R 3 x 10  30x 2# 20x          Left s/l right shoulder ER and abduction 3 x 10  30x 2# 20x          Prone shoulder hughston's 1,2 and 5 3 x 10 NT 30x 2# 20x          TB ext, rows     Red 20x          Supine cane chest press, abd/add/ flex     2# 30x                                                                               Modalities  /         CP to right shoulder with pt seated after PT treatment 10 min  10 min  10 min  10 min

## 2018-02-06 NOTE — PROGRESS NOTES
PT Re-Evaluation     Today's date: 2018  Patient name: Gab Spear  : 1940  MRN: [id number]  Referring provider: Kyra Rutledge MD  Dx:   Encounter Diagnosis   Name Primary?  Secondary osteoarthritis of right shoulder Yes                  Assessment  Impairments: abnormal or restricted ROM and impaired physical strength    Assessment details: Patient seen for PT reassessment  Patient reported the following progress: decrease in right shoulder pain, increase in right shoulder mobility and strength and functional progress  Patient noted weakness and fatigue persist as his primary deficit limiting all functional activities  Understanding of Dx/Px/POC: excellentPrognosis details: Patient presents with the following progress since onset of PT : decrease in right shoulder pain, increase in right ue rom and strength, and functional deficits  But, he continues to present with the following deficits that still persist and limit all iadls and functional activities: right GH joint pain, decrease in right ue rom and strength and postural dysfunction  Goals  Short Term goals - 4 weeks  1  Patient will be independent HEP  2   Patient will report a 25 - 50% decrease in pain complaints  3   Increase strength 1/2 grade  4   Increase ROM 5-10 degrees  Long Term goals - 8 weeks  1  Patient will report elimination of pain complaints  2   Patient will return to all work related activities without restriction  3   Patient will return to all recreational activities without restriction  4   ROM WFL  5   Strength 5/5      Plan  Patient would benefit from: skilled PT  Planned modality interventions: cryotherapy, TENS and thermotherapy: hydrocollator packs  Planned therapy interventions: manual therapy, motor coordination training, neuromuscular re-education, patient education, strengthening, stretching, therapeutic activities, therapeutic exercise, functional ROM exercises, therapeutic training and home exercise program  Frequency: 2x week  Duration in weeks: 8  Treatment plan discussed with: patient        Subjective Evaluation    Pain  Current pain ratin  At best pain ratin  At worst pain ratin  Progression: improved    Treatments  Previous treatment: physical therapy  Patient Goals  Patient goals for therapy: increased motion and increased strength          Objective     Active Range of Motion     Right Shoulder   Flexion: 152 degrees   Abduction: 154 degrees   External rotation 90°: 92 degrees  Internal rotation 0°: 66 degrees     Strength/Myotome Testing     Right Shoulder     Planes of Motion   Flexion: 4   Abduction: 4   External rotation at 0°: 4   Internal rotation at 0°: 4-     Right Elbow   Flexion: 4+  Extension: 4      Precautions: Right [address] Joint AROM until 18 then strengthening  Precautions: right shoulder replacement occurred on 17  HTN  AROM on 18  Strengthening  18      Daily Treatment Diary      Manual                   Prom right shoulder 10 min 10 min  10 min                                                                                                                        Exercise Diary                   Pulleys 5 min NT  4min                  UBE: F/B 10 min 10 min  10 min                  Scapular squeezes 3 x 10 30x 30x                 Wall slides flexion and scaption 3 x 10 20x 3sec  20x 3sec                  Shoulder scaption:B 3 x 10 30x 30x                 Shoulder flexion:B: 3 x 10 30x 30x                 Biceps curls:B: 3 # x 30 3# 30x 4# 30x                  Shoulder IR and ER arom with upper arm adducted R: 3 x 10 NT  NT                  Body Blade:B: hands at waist, 90 and 125 degrees   30 sec x 2 NT 30sec  5x                  Standing and supine shoulder flexion 3 x 10 30x  30x                 Supine triceps and scap punches:R 3 x 10   30x                 Left s/l right shoulder ER and abduction 3 x 10   30x                 Prone shoulder hughston's 1,2 and 5 3 x 10 NT 30x                                                                                                                                                                                               Modalities  1/25 1/30 2/1                 CP to right shoulder with pt seated after PT treatment 10 min  10 min  10 min

## 2018-02-07 RX ORDER — AMLODIPINE BESYLATE 10 MG/1
10 TABLET ORAL DAILY
Qty: 30 TABLET | Refills: 0 | Status: SHIPPED | OUTPATIENT
Start: 2018-02-07 | End: 2018-02-23 | Stop reason: SINTOL

## 2018-02-07 NOTE — TELEPHONE ENCOUNTER
Received refill request for pt's anti-hypertensive  One month sent -- pt needs to make follow up appointment with Dr John Eye

## 2018-02-08 ENCOUNTER — OFFICE VISIT (OUTPATIENT)
Dept: PHYSICAL THERAPY | Age: 78
End: 2018-02-08
Payer: COMMERCIAL

## 2018-02-08 DIAGNOSIS — M19.211 SECONDARY OSTEOARTHRITIS OF RIGHT SHOULDER: Primary | ICD-10-CM

## 2018-02-08 PROCEDURE — 97140 MANUAL THERAPY 1/> REGIONS: CPT

## 2018-02-08 PROCEDURE — 97110 THERAPEUTIC EXERCISES: CPT

## 2018-02-08 NOTE — PROGRESS NOTES
PT Re-Evaluation     Today's date: 2018  Patient name: Lynn Sotelo  : 1940  MRN: [id number]  Referring provider: Win Zapien MD  Dx:   Encounter Diagnosis   Name Primary?  Secondary osteoarthritis of right shoulder Yes                  Assessment  Impairments: abnormal or restricted ROM and impaired physical strength    Assessment details: Patient seen for PT reassessment  Patient reported the following progress: decrease in right shoulder pain, increase in right shoulder mobility and strength and functional progress  Patient noted weakness and fatigue persist as his primary deficit limiting all functional activities  Understanding of Dx/Px/POC: excellentPrognosis details: Patient presents with the following progress since onset of PT : decrease in right shoulder pain, increase in right ue rom and strength, and functional deficits  But, he continues to present with the following deficits that still persist and limit all iadls and functional activities: right GH joint pain, decrease in right ue rom and strength and postural dysfunction  Goals  Short Term goals - 4 weeks  1  Patient will be independent HEP  2   Patient will report a 25 - 50% decrease in pain complaints  3   Increase strength 1/2 grade  4   Increase ROM 5-10 degrees  Long Term goals - 8 weeks  1  Patient will report elimination of pain complaints  2   Patient will return to all work related activities without restriction  3   Patient will return to all recreational activities without restriction  4   ROM WFL  5   Strength 5/5      Plan  Patient would benefit from: skilled PT  Planned modality interventions: cryotherapy, TENS and thermotherapy: hydrocollator packs  Planned therapy interventions: manual therapy, motor coordination training, neuromuscular re-education, patient education, strengthening, stretching, therapeutic activities, therapeutic exercise, functional ROM exercises, therapeutic training and home exercise program  Frequency: 2x week  Duration in weeks: 8  Treatment plan discussed with: patient        Subjective Evaluation    Pain  Current pain ratin  At best pain ratin  At worst pain ratin  Progression: improved    Treatments  Previous treatment: physical therapy  Patient Goals  Patient goals for therapy: increased motion and increased strength          Objective     Active Range of Motion     Right Shoulder   Flexion: 152 degrees   Abduction: 154 degrees   External rotation 90°: 92 degrees  Internal rotation 0°: 66 degrees     Strength/Myotome Testing     Right Shoulder     Planes of Motion   Flexion: 4   Abduction: 4   External rotation at 0°: 4   Internal rotation at 0°: 4-     Right Elbow   Flexion: 4+  Extension: 4      Precautions: Right [facility] Joint AROM until 18 then strengthening  Precautions: right shoulder replacement occurred on 17  HTN  AROM on 18  Strengthening  18      Daily Treatment Diary      Manual                   Prom right shoulder 10 min 10 min  10 min                                                                                                                        Exercise Diary                   Pulleys 5 min NT  4min                  UBE: F/B 10 min 10 min  10 min                  Scapular squeezes 3 x 10 30x 30x                 Wall slides flexion and scaption 3 x 10 20x 3sec  20x 3sec                  Shoulder scaption:B 3 x 10 30x 30x                 Shoulder flexion:B: 3 x 10 30x 30x                 Biceps curls:B: 3 # x 30 3# 30x 4# 30x                  Shoulder IR and ER arom with upper arm adducted R: 3 x 10 NT  NT                  Body Blade:B: hands at waist, 90 and 125 degrees   30 sec x 2 NT 30sec  5x                  Standing and supine shoulder flexion 3 x 10 30x  30x                 Supine triceps and scap punches:R 3 x 10   30x                 Left s/l right shoulder ER and abduction 3 x 10   30x                 Prone shoulder hughston's 1,2 and 5 3 x 10 NT 30x                                                                                                                                                                                               Modalities  1/25 1/30 2/1                 CP to right shoulder with pt seated after PT treatment 10 min  10 min  10 min

## 2018-02-08 NOTE — PROGRESS NOTES
Daily Note     Today's date: 2018  Patient name: Kimberley Johnson  : 1940  MRN: [id number]  Referring provider: Ryan Batista MD  Dx:   Encounter Diagnosis   Name Primary?  Secondary osteoarthritis of right shoulder Yes                Subjective: Pt reported no c/o today  Objective: See treatment diary below      Assessment: Pt presents with weakness while performing exercises  No pain tolerated session well  Plan: Progress as per protocol     Precautions:     Daily Treatment Diary     Manual          Prom right shoulder 10 min 10 min  10 min  10 min  10 min                                                                 Exercise Diary          Pulleys 5 min NT  4min  NT  4 min        UBE: F/B 10 min 10 min  10 min  10 min  10 min         Scapular squeezes 3 x 10 30x 30x NT  NT         Wall slides flexion and scaption 3 x 10 20x 3sec  20x 3sec  20x 3sec  NT         Shoulder scaption:B 3 x 10 30x 30x 2# 20x  2# 20x         Shoulder flexion:B: 3 x 10 30x 30x 2# 20x  2# 20x         Biceps curls:B: 3 # x 30 3# 30x 4# 30x  4# 20x  4# 20x         Shoulder IR and ER arom with upper arm adducted R: 3 x 10 NT  NT  NT  NT         Body Blade:B: hands at waist, 90 and 125 degrees   30 sec x 2 NT 30sec  5x  NT  NT         Standing and supine shoulder flexion 3 x 10 30x  30x 30x 2# 30x 2#         Supine triceps and scap punches:R 3 x 10  30x 2# 20x  2# 20x        Left s/l right shoulder ER and abduction 3 x 10  30x 2# 20x  2# 20x        Prone shoulder hughston's 1,2 and 5 3 x 10 NT 30x 2# 20x  2# 20x        TB ext, rows     Red 20x  Red 20x         Supine cane chest press, abd/add/ flex     2# 30x  2 5# 20x                                                                              Modalities   2/8        CP to right shoulder with pt seated after PT treatment 10 min  10 min  10 min  10 min  10 min

## 2018-02-13 ENCOUNTER — OFFICE VISIT (OUTPATIENT)
Dept: PHYSICAL THERAPY | Age: 78
End: 2018-02-13
Payer: COMMERCIAL

## 2018-02-13 DIAGNOSIS — M12.811 ROTATOR CUFF TEAR ARTHROPATHY, RIGHT: Primary | ICD-10-CM

## 2018-02-13 DIAGNOSIS — M19.211 SECONDARY OSTEOARTHRITIS OF RIGHT SHOULDER: ICD-10-CM

## 2018-02-13 DIAGNOSIS — M75.101 ROTATOR CUFF TEAR ARTHROPATHY, RIGHT: Primary | ICD-10-CM

## 2018-02-13 PROCEDURE — 97140 MANUAL THERAPY 1/> REGIONS: CPT | Performed by: PHYSICAL THERAPIST

## 2018-02-13 PROCEDURE — 97110 THERAPEUTIC EXERCISES: CPT | Performed by: PHYSICAL THERAPIST

## 2018-02-13 NOTE — PROGRESS NOTES
Daily Note     Today's date: 2018  Patient name: Harsha Bronson  : 1940  MRN: [id number]  Referring provider: Rosaura Wilson MD  Dx:   No diagnosis found  Subjective: Pt reported no c/o pain today  Objective: See treatment diary below      Assessment: Patient reports no pain with manual stretching nor with progressions in program  Full ER ROM, mildly tight at end range flexion, mildly tight at end range IR , full abduction ROM, Progressed program as indicated, no pain  Plan: Progress as per protocol, cont  POC    Precautions:     Daily Treatment Diary     Manual         Prom right shoulder 10 min 10 min  10 min  10 min  10 min  10 min                                                               Exercise Diary         Pulleys 5 min NT  4min  NT  4 min 2 min       UBE: F/B 10 min 10 min  10 min  10 min  10 min  10 min       Scapular squeezes 3 x 10 30x 30x NT  NT  NT       Wall slides flexion and scaption 3 x 10 20x 3sec  20x 3sec  20x 3sec  NT  NT       Shoulder scaption:B 3 x 10 30x 30x 2# 20x  2# 20x  2#  20x       Shoulder flexion:B: 3 x 10 30x 30x 2# 20x  2# 20x  2#  20x       Biceps curls:B: 3 # x 30 3# 30x 4# 30x  4# 20x  4# 20x  5#  30x       Shoulder IR and ER arom with upper arm adducted R: 3 x 10 NT  NT  NT  NT  NT       Body Blade:B: hands at waist, 90 and 125 degrees   30 sec x 2 NT 30sec  5x  NT  NT  NT       Standing and supine shoulder flexion 3 x 10 30x  30x 30x 2# 30x 2#  3#  30x       Supine triceps and scap punches:R 3 x 10  30x 2# 20x  2# 20x 3#  30x       Left s/l right shoulder ER and abduction 3 x 10  30x 2# 20x  2# 20x 2#  30x       Prone shoulder hughston's 1,2 and 5 3 x 10 NT 30x 2# 20x  2# 20x 2#  30x       TB ext, rows     Red 20x  Red 20x  Green  20x       Supine cane chest press, abd/add/ flex     2# 30x  2 5# 20x  3#  30x Modalities  1/25 1/30 2/1 2/6 2/8 2/13       CP to right shoulder with pt seated after PT treatment 10 min  10 min  10 min  10 min  10 min  10 min

## 2018-02-15 ENCOUNTER — OFFICE VISIT (OUTPATIENT)
Dept: PHYSICAL THERAPY | Age: 78
End: 2018-02-15
Payer: COMMERCIAL

## 2018-02-15 DIAGNOSIS — M75.101 ROTATOR CUFF TEAR ARTHROPATHY, RIGHT: Primary | ICD-10-CM

## 2018-02-15 DIAGNOSIS — M12.811 ROTATOR CUFF TEAR ARTHROPATHY, RIGHT: Primary | ICD-10-CM

## 2018-02-15 DIAGNOSIS — M19.211 SECONDARY OSTEOARTHRITIS OF RIGHT SHOULDER: ICD-10-CM

## 2018-02-15 PROCEDURE — 97140 MANUAL THERAPY 1/> REGIONS: CPT

## 2018-02-15 PROCEDURE — 97110 THERAPEUTIC EXERCISES: CPT

## 2018-02-15 NOTE — PROGRESS NOTES
Daily Note     Today's date: 2/15/2018  Patient name: Barbra Shaw  : 1940  MRN: [id number]  Referring provider: Kitty Sherman MD  Dx:   Encounter Diagnoses   Name Primary?  Rotator cuff tear arthropathy, right Yes    Secondary osteoarthritis of right shoulder                 Subjective: Pt reported no new complaints  Objective: See treatment diary below      Assessment: Pt tolerated session well  Challenged with body blade  Plan: Progress as per protocol, cont  POC    Precautions:     Daily Treatment Diary     Manual  1/25 1/30 2/1 2/6 2/8 2/13 2/15      Prom right shoulder 10 min 10 min  10 min  10 min  10 min  10 min 10 min                                                               Exercise Diary  1/25 1/30 2/1 2/6 2/8 2/13 2/15      Pulleys 5 min NT  4min  NT  4 min 2 min 4 min       UBE: F/B 10 min 10 min  10 min  10 min  10 min  10 min 10 min       Scapular squeezes 3 x 10 30x 30x NT  NT  NT NT      Wall slides flexion and scaption 3 x 10 20x 3sec  20x 3sec  20x 3sec  NT  NT NT       Shoulder scaption:B 3 x 10 30x 30x 2# 20x  2# 20x  2#  20x 3# 30x      Shoulder flexion:B: 3 x 10 30x 30x 2# 20x  2# 20x  2#  20x 3# 30x      Biceps curls:B: 3 # x 30 3# 30x 4# 30x  4# 20x  4# 20x  5#  30x 4# 30x      Shoulder IR and ER arom with upper arm adducted R: 3 x 10 NT  NT  NT  NT  NT NT       Body Blade:B: hands at waist, 90 and 125 degrees   30 sec x 2 NT 30sec  5x  NT  NT  NT 30sec 5x       Standing and supine shoulder flexion 3 x 10 30x  30x 30x 2# 30x 2#  3#  30x 4# 30x      Supine triceps and scap punches:R 3 x 10  30x 2# 20x  2# 20x 3#  30x 4# 30x      Left s/l right shoulder ER and abduction 3 x 10  30x 2# 20x  2# 20x 2#  30x 4# 30x      Prone shoulder hughston's 1,2 and 5 3 x 10 NT 30x 2# 20x  2# 20x 2#  30x 4#30x      TB ext, rows     Red 20x  Red 20x  Green  20x Blue  30x      Supine cane chest press, abd/add/ flex     2# 30x  2 5# 20x  3#  30x 4#  30x      Red Ball bounce on wall 20x                                                              Modalities  1/25 1/30 2/1 2/6 2/8 2/13 2/15      CP to right shoulder with pt seated after PT treatment 10 min  10 min  10 min  10 min  10 min  10 min 10 min

## 2018-02-15 NOTE — PROGRESS NOTES
PT Re-Evaluation / Discharge  Today's date: 2/15/2018  Patient name: Frank Conde  : 1940  MRN: [id number]  Referring provider: Prasad Fontenot MD  Dx:   Encounter Diagnoses   Name Primary?  Rotator cuff tear arthropathy, right Yes    Secondary osteoarthritis of right shoulder        Start Time: 1200  Stop Time: 1300  Total time in clinic (min): 60 minutes     Patient d/c to hep due to functional and impairment progress  Thus, please refer to below listed PT reassessment for patient status at most recent comprehensive assessment regarding patient status  Assessment  Impairments: impaired physical strength    Assessment details: PT Reassessment  Understanding of Dx/Px/POC: good   Prognosis: good  Prognosis details: Patient reported the following progress since onset of Physical Therapy: " everything" with specifics of pt noting right shoulder strength, ROM and functional progress have all occurred  Patient's self foto computerized functional index has improved since onset of PT which was at 80 on today's reassessment and projected score is at 73  Patient presents with the following progress since onset of Physical Therapy: increase in right shoulder rom and strength improvements, decrease in pain and increase in functional activities  But, he presents with the following deficits that still persist and I would recommend PT to continue under original PT POC to address the following deficits, problems and concerns that still persist: intermittent right shoulder pain, decrease in right ue rom and strength, decrease in postural awareness and functional deficits with spouse, house hold activities and iadls  Goals  Short Term goals - 4 weeks  1  Patient will be independent HEP  MET  2  Patient will report a 25 - 50% decrease in pain complaints  MET  3  Increase strength 1/2 grade  MET  4  Increase ROM 5-10 degrees  MET    Long Term goals - 8 weeks  1    Patient will report elimination of pain complaints  Partially Met  2  Patient will return to all house hold and self functional activities without restriction  Partially Met  3  Patient will return to all activities assisting his wife without restriction  Partially Met  4  ROM WFL  Partially Met  5  Strength 5/5  Partially Met    Plan  Patient would benefit from: skilled PT  Planned modality interventions: cryotherapy, TENS and thermotherapy: hydrocollator packs  Planned therapy interventions: ADL training, neuromuscular re-education, patient education, self care, strengthening, stretching, therapeutic activities, therapeutic exercise, flexibility, therapeutic training, transfer training, functional ROM exercises, graded activity, graded exercise, graded motor and home exercise program  Frequency: 2x week  Duration in weeks: 6  Treatment plan discussed with: patient        Subjective Evaluation    History of Present Illness  Mechanism of injury: Right TSA  Quality of life: good    Pain  At best pain ratin  At worst pain rating: 3  Aggravating factors: overhead activity and lifting  Progression: improved    Treatments  Current treatment: physical therapy  Patient Goals  Patient goals for therapy: decreased pain, increased motion and increased strength          Objective     Tenderness     Additional Tenderness Details  Patient is - TTP at right [address] joint region  Active Range of Motion     Right Shoulder   Flexion: 165 degrees   Abduction: 165 degrees   External rotation 90°: 96 degrees  Internal rotation 90°: 75 degrees     Strength/Myotome Testing     Right Shoulder     Planes of Motion   Flexion: 5   Abduction: 4+   External rotation at 0°: 4+   Internal rotation at 45°: 4-     Isolated Muscles   Biceps: 5     Right Elbow   Flexion: 5  Extension: 4+      Precautions: Right shoulder pain aggravation      Precautions:      Daily Treatment Diary with LEANDRA Roberts   2/15         Prom right shoulder 10 min 10 min  10 min  10 min  10 min  10 min 10 min                                                                                                                Exercise Diary  1/25 1/30 2/1 2/6 2/8 2/13 2/15         Pulleys 5 min NT  4min  NT  4 min 2 min 4 min          UBE: F/B 10 min 10 min  10 min  10 min  10 min  10 min 10 min          Scapular squeezes 3 x 10 30x 30x NT  NT  NT NT         Wall slides flexion and scaption 3 x 10 20x 3sec  20x 3sec  20x 3sec  NT  NT NT          Shoulder scaption:B 3 x 10 30x 30x 2# 20x  2# 20x  2#  20x 3# 30x         Shoulder flexion:B: 3 x 10 30x 30x 2# 20x  2# 20x  2#  20x 3# 30x         Biceps curls:B: 3 # x 30 3# 30x 4# 30x  4# 20x  4# 20x  5#  30x 4# 30x         Shoulder IR and ER arom with upper arm adducted R: 3 x 10 NT  NT  NT  NT  NT NT          Body Blade:B: hands at waist, 90 and 125 degrees   30 sec x 2 NT 30sec  5x  NT  NT  NT 30sec 5x          Standing and supine shoulder flexion 3 x 10 30x  30x 30x 2# 30x 2#  3#  30x 4# 30x         Supine triceps and scap punches:R 3 x 10   30x 2# 20x  2# 20x 3#  30x 4# 30x         Left s/l right shoulder ER and abduction 3 x 10   30x 2# 20x  2# 20x 2#  30x 4# 30x         Prone shoulder hughston's 1,2 and 5 3 x 10 NT 30x 2# 20x  2# 20x 2#  30x 4#30x         TB ext, rows        Red 20x  Red 20x  Green  20x Blue  30x         Supine cane chest press, abd/add/ flex        2# 30x  2 5# 20x  3#  30x 4#  30x                                                                                                                                       Modalities  1/25 1/30 2/1 2/6 2/8 2/13 2/15         CP to right shoulder with pt seated after PT treatment 10 min  10 min  10 min  10 min  10 min  10 min 10 min                                                                      Flowsheet Rows    Flowsheet Row Most Recent Value   PT/OT G-Codes   Current Score  80   Projected Score  73   FOTO information reviewed  Yes Assessment Type  Re-evaluation   G code set  Other PT/OT Primary

## 2018-02-19 ENCOUNTER — OFFICE VISIT (OUTPATIENT)
Dept: OBGYN CLINIC | Facility: HOSPITAL | Age: 78
End: 2018-02-19

## 2018-02-19 VITALS
WEIGHT: 190 LBS | DIASTOLIC BLOOD PRESSURE: 81 MMHG | SYSTOLIC BLOOD PRESSURE: 149 MMHG | BODY MASS INDEX: 28.79 KG/M2 | HEART RATE: 80 BPM | HEIGHT: 68 IN

## 2018-02-19 DIAGNOSIS — Z47.89 AFTERCARE FOLLOWING SURGERY OF THE MUSCULOSKELETAL SYSTEM: Primary | ICD-10-CM

## 2018-02-19 PROCEDURE — 99024 POSTOP FOLLOW-UP VISIT: CPT | Performed by: PHYSICIAN ASSISTANT

## 2018-02-19 NOTE — PROGRESS NOTES
Assessment:       1  Aftercare following surgery of the musculoskeletal system          Plan:         Patient is doing well postoperatively  He is very happy with his progress regarding range of motion and strength  Patient can discontinue therapy at this time  Follow-up will be as needed  Subjective:     Patient ID: Xander Mejia is a 78 y o  male  Chief Complaint: This 73-year-old male presents to the office two and half months status post reverse total shoulder arthroplasty  He is very pleased with his progress, range of motion and strength  He is pain-free  He is worried that there is a prominence over that perhaps his cyst is returning  Otherwise, he has no concerns  Social History     Occupational History    Not on file  Social History Main Topics    Smoking status: Former Smoker     Packs/day: 1 00     Quit date: 1980    Smokeless tobacco: Never Used    Alcohol use No    Drug use: No    Sexual activity: Not on file      Review of Systems   Constitutional: Negative for chills and fever  Respiratory: Negative for chest tightness and shortness of breath  Musculoskeletal: Negative for arthralgias and myalgias  Skin: Negative  Neurological: Negative for weakness and numbness  Psychiatric/Behavioral: Negative for agitation and behavioral problems  Objective:     Ortho ExamPhysical Exam   Constitutional: He is oriented to person, place, and time  He appears well-developed and well-nourished  HENT:   Head: Normocephalic  Cardiovascular: Intact distal pulses  Pulmonary/Chest: Effort normal    Musculoskeletal: He exhibits no edema or tenderness  Range of motion equivalent  Strength is 5/5  There is a bony protrusions at the [facility] joint consistent with hypertrophy noted preoperatively  Neurological: He is alert and oriented to person, place, and time  Skin: Skin is warm and dry  Psychiatric: He has a normal mood and affect   His behavior is normal

## 2018-02-19 NOTE — PATIENT INSTRUCTIONS
Patient is doing well postoperatively  He is very happy with his progress regarding range of motion and strength  Patient can discontinue therapy at this time  Follow-up will be as needed

## 2018-02-20 ENCOUNTER — APPOINTMENT (OUTPATIENT)
Dept: PHYSICAL THERAPY | Age: 78
End: 2018-02-20
Payer: COMMERCIAL

## 2018-02-22 ENCOUNTER — APPOINTMENT (OUTPATIENT)
Dept: PHYSICAL THERAPY | Age: 78
End: 2018-02-22
Payer: COMMERCIAL

## 2018-02-23 ENCOUNTER — OFFICE VISIT (OUTPATIENT)
Dept: FAMILY MEDICINE CLINIC | Facility: CLINIC | Age: 78
End: 2018-02-23
Payer: COMMERCIAL

## 2018-02-23 VITALS
DIASTOLIC BLOOD PRESSURE: 100 MMHG | TEMPERATURE: 96.1 F | SYSTOLIC BLOOD PRESSURE: 170 MMHG | HEART RATE: 78 BPM | WEIGHT: 191 LBS | RESPIRATION RATE: 18 BRPM | HEIGHT: 68 IN | BODY MASS INDEX: 28.95 KG/M2

## 2018-02-23 DIAGNOSIS — E66.3 OVERWEIGHT (BMI 25.0-29.9): ICD-10-CM

## 2018-02-23 DIAGNOSIS — R79.89 ABNORMAL TSH: ICD-10-CM

## 2018-02-23 DIAGNOSIS — I10 HYPERTENSION, UNSPECIFIED TYPE: Primary | ICD-10-CM

## 2018-02-23 PROBLEM — E66.09 OBESITY DUE TO EXCESS CALORIES: Status: ACTIVE | Noted: 2018-02-23

## 2018-02-23 PROCEDURE — 99214 OFFICE O/P EST MOD 30 MIN: CPT | Performed by: FAMILY MEDICINE

## 2018-02-23 RX ORDER — HYDROCHLOROTHIAZIDE 25 MG/1
25 TABLET ORAL
Qty: 90 TABLET | Refills: 2 | Status: SHIPPED | OUTPATIENT
Start: 2018-02-23 | End: 2018-12-22 | Stop reason: SDUPTHER

## 2018-02-23 RX ORDER — LOSARTAN POTASSIUM 50 MG/1
100 TABLET ORAL DAILY
Qty: 90 TABLET | Refills: 3 | Status: SHIPPED | OUTPATIENT
Start: 2018-02-23 | End: 2018-06-26

## 2018-02-23 NOTE — PROGRESS NOTES
Oliver SEBASTIAN Annemarie 1940 male MRN: [id number]    Family Medicine Follow-up Visit    ASSESSMENT/PLAN  Diagnoses and all orders for this visit:    Hypertension, unspecified type  Comments:  -  Patient brought in today for complaint of right leg swelling secondary to amlodipine 10 mg  patient  recently was seen by Nephrology who increased his losartan to 100mg in the AM  - Will start the following: Losartan 100mg daily, and HCTZ 25mg daily  - 2 week visit for BP check   Orders:  -     Lipid panel; Future  -     losartan (COZAAR) 50 mg tablet; Take 2 tablets (100 mg total) by mouth daily  -     hydrochlorothiazide (HYDRODIURIL) 25 mg tablet; Take 1 tablet (25 mg total) by mouth daily in the early morning    Abnormal TSH  Comments:   -History of elevated TSH greater than 4 in 2017  Will repeat today given that patient's concern of weight gain  - No Fmhx of thyroid disease   Orders:  -     TSH, 3rd generation with T4 reflex; Future    Overweight (BMI 25 0-29  9)  Comments:  -  TSH/cholesterol script given today  -  currently the patient does no form of exercise  Educated him on starting 15 minutes walk on a daily basis  Patient  States that he will also attempt to have diet control as well  -  Patient with an upcoming appointment [facility] for possible biopsy of his prostate  Will obtain records prior to our next visit    Disposition: returns to the office in 2 weeks for BP check    Future Appointments  Date Time Provider Treva Nolan   3/15/2018 5:40 PM Marybel Reyes MD [facility]          SUBJECTIVE  CC: Hypertension      HPI:  John Lee is a 78 y o  male who presents for   A follow-up on hypertension  The patient stated that his blood pressure has been elevated in the 150s to 180s  Recently seen by Nephrology who increased his losartan to 100 mg and advised him to  Discontinue his Norvasc    The patient states that since this change she has had elevated BP is and restarted his amlodipine  His leg swelling has returned  He states that Norvasc does control asthma blood pressures but cannot tolerate the leg swelling  Patient denies any chest pain, dizziness, shortness of breath when  Blood pressure is elevated  Patient also wants to make of note that he is going to be having a cystoscopy performed at [facility] for possible biopsy of the prostate  Patient will also like to have his thyroid repeated given the concern of weight gain  He states that he has been doing the same dieting since he could only remember but continues to have weight gain  Denies any form of exercise on a daily basis  Review of Systems   Constitutional: Negative for activity change and appetite change  HENT: Negative for congestion and sore throat  Respiratory: Negative for cough, chest tightness and shortness of breath  Cardiovascular: Positive for leg swelling  Negative for chest pain  Gastrointestinal: Negative for abdominal distention and abdominal pain  Musculoskeletal: Negative for arthralgias and back pain  Skin: Negative for rash  Neurological: Negative for dizziness  All other systems reviewed and are negative        Historical Information   The patient history was reviewed as follows:    Past Medical History:   Diagnosis Date    Arthritis     BPH without urinary obstruction     CKD (chronic kidney disease), stage III     baseline 1 6-1 7    GERD (gastroesophageal reflux disease)     Hyperlipidemia     Hypertension      Past Surgical History:   Procedure Laterality Date    APPENDECTOMY      FRACTURE SURGERY      [facility] RECONSTR TOTAL SHOULDER IMPLANT Right 12/5/2017    Procedure: ARTHROPLASTY SHOULDER REVERSE with OPEN CYST EXCISION;  Surgeon: Prema Olguin MD;  Location: [facility];  Service: Orthopedics     Family History   Problem Relation Age of Onset    No Known Problems Mother     No Known Problems Father       Social History   History   Alcohol Use No History   Drug Use No     History   Smoking Status    Former Smoker    Packs/day: 1 00    Quit date: 1980   Smokeless Tobacco    Never Used       Medications:     Current Outpatient Prescriptions:     losartan (COZAAR) 50 mg tablet, Take 2 tablets (100 mg total) by mouth daily, Disp: 90 tablet, Rfl: 3    omeprazole (PriLOSEC) 20 mg delayed release capsule, Take 20 mg by mouth daily, Disp: , Rfl:     oxyCODONE (ROXICODONE) 5 mg immediate release tablet, 1 tablets every 4 hours as needed for pain , Disp: 30 tablet, Rfl: 0    tamsulosin (FLOMAX) 0 4 mg, Take 0 4 mg by mouth daily with dinner, Disp: , Rfl:     hydrochlorothiazide (HYDRODIURIL) 25 mg tablet, Take 1 tablet (25 mg total) by mouth daily in the early morning, Disp: 90 tablet, Rfl: 2  No Known Allergies    OBJECTIVE    Vitals:   Vitals:    02/23/18 0924   BP: 170/100   Pulse: 78   Resp: 18   Temp: (!) 96 1 °F (35 6 °C)   Weight: 86 6 kg (191 lb)   Height: 5' 8" (1 727 m)           Physical Exam   Constitutional: He appears well-developed and well-nourished  HENT:   Head: Normocephalic and atraumatic  Eyes: Conjunctivae and EOM are normal  Pupils are equal, round, and reactive to light  Neck: Normal range of motion  Neck supple  Cardiovascular: Normal rate and regular rhythm  No murmur heard  Pulmonary/Chest: Effort normal and breath sounds normal  No respiratory distress  He has no wheezes  Abdominal: Soft  Bowel sounds are normal  He exhibits no distension  There is no tenderness  Musculoskeletal: Normal range of motion  He exhibits edema (Right > left pedal edema)  Neurological: He is alert  Skin: Skin is warm and dry  No rash noted  Psychiatric: He has a normal mood and affect  His behavior is normal  Judgment and thought content normal    Vitals reviewed           Humaira Casey MD, PGY-3  [facility] Medicine   2/23/2018

## 2018-02-27 ENCOUNTER — APPOINTMENT (OUTPATIENT)
Dept: PHYSICAL THERAPY | Age: 78
End: 2018-02-27
Payer: COMMERCIAL

## 2018-02-28 ENCOUNTER — TELEPHONE (OUTPATIENT)
Dept: FAMILY MEDICINE CLINIC | Facility: CLINIC | Age: 78
End: 2018-02-28

## 2018-02-28 NOTE — TELEPHONE ENCOUNTER
Pt states he uses [facility] on [address] and dropped off script for percocet approximately 2 weeks ago and never picked up the script  Pt states pharmacy told him that he picked it up and they have his signature  States he found this out when he tried to  med 3 days  Also pt states doctor increased dosage for losartan from 50 to 100 mg pt states he didn't pick this script up either but pharmacy states that he did  Pt was told that doctor needs to call insurance company to get new scripts  Please call him

## 2018-03-06 NOTE — TELEPHONE ENCOUNTER
The last thing I see for this pt was prescribed by Mateus Simon and that was filled 12/7/17  I am not sure why he is referring to this  So my question do you want to continue percocet it seems to have been awhile and should pt be getting losartan 100mg and I will clear up with the pharmacy once I know for certain what is correct for him    Thank you

## 2018-03-06 NOTE — TELEPHONE ENCOUNTER
I dont fill percocet for this patient  Nor did I know he was on this medication  I am ok with Losartan 100mg daily   That's what I ordered for him

## 2018-03-06 NOTE — TELEPHONE ENCOUNTER
I left a message for the pt no Percocet for the pt  Dr Virginia Bañuelos has never supplied this for the pt  If he needs to discuss pain he is experiencing will need a visit  Particia Quick has been filled and I left a message stating all this

## 2018-03-08 ENCOUNTER — TRANSCRIBE ORDERS (OUTPATIENT)
Dept: LAB | Facility: HOSPITAL | Age: 78
End: 2018-03-08

## 2018-03-08 ENCOUNTER — APPOINTMENT (OUTPATIENT)
Dept: LAB | Facility: HOSPITAL | Age: 78
End: 2018-03-08
Payer: COMMERCIAL

## 2018-03-08 DIAGNOSIS — R79.89 ABNORMAL TSH: ICD-10-CM

## 2018-03-08 DIAGNOSIS — I10 HYPERTENSION, UNSPECIFIED TYPE: ICD-10-CM

## 2018-03-08 DIAGNOSIS — R35.1 NOCTURIA: ICD-10-CM

## 2018-03-08 DIAGNOSIS — N40.1 ENLARGED PROSTATE WITH URINARY OBSTRUCTION: Primary | ICD-10-CM

## 2018-03-08 DIAGNOSIS — N13.8 ENLARGED PROSTATE WITH URINARY OBSTRUCTION: Primary | ICD-10-CM

## 2018-03-08 LAB
BACTERIA UR QL AUTO: ABNORMAL /HPF
BILIRUB UR QL STRIP: NEGATIVE
CHOLEST SERPL-MCNC: 174 MG/DL (ref 50–200)
CLARITY UR: ABNORMAL
COLOR UR: YELLOW
GLUCOSE UR STRIP-MCNC: NEGATIVE MG/DL
HDLC SERPL-MCNC: 43 MG/DL (ref 40–60)
HGB UR QL STRIP.AUTO: ABNORMAL
HYALINE CASTS #/AREA URNS LPF: ABNORMAL /LPF
KETONES UR STRIP-MCNC: NEGATIVE MG/DL
LDLC SERPL CALC-MCNC: 115 MG/DL (ref 0–100)
LEUKOCYTE ESTERASE UR QL STRIP: ABNORMAL
NITRITE UR QL STRIP: NEGATIVE
NON-SQ EPI CELLS URNS QL MICRO: ABNORMAL /HPF
PH UR STRIP.AUTO: 5.5 [PH] (ref 4.5–8)
PROT UR STRIP-MCNC: ABNORMAL MG/DL
RBC #/AREA URNS AUTO: ABNORMAL /HPF
SP GR UR STRIP.AUTO: 1.01 (ref 1–1.03)
TRIGL SERPL-MCNC: 82 MG/DL
TSH SERPL DL<=0.05 MIU/L-ACNC: 2.69 UIU/ML (ref 0.36–3.74)
UROBILINOGEN UR QL STRIP.AUTO: 0.2 E.U./DL
WBC #/AREA URNS AUTO: ABNORMAL /HPF

## 2018-03-08 PROCEDURE — 84443 ASSAY THYROID STIM HORMONE: CPT

## 2018-03-08 PROCEDURE — 36415 COLL VENOUS BLD VENIPUNCTURE: CPT

## 2018-03-08 PROCEDURE — 81001 URINALYSIS AUTO W/SCOPE: CPT

## 2018-03-08 PROCEDURE — 80061 LIPID PANEL: CPT

## 2018-03-08 PROCEDURE — 87086 URINE CULTURE/COLONY COUNT: CPT

## 2018-03-09 LAB — BACTERIA UR CULT: NORMAL

## 2018-04-13 DIAGNOSIS — K21.9 GASTROESOPHAGEAL REFLUX DISEASE WITHOUT ESOPHAGITIS: Primary | ICD-10-CM

## 2018-04-13 RX ORDER — OMEPRAZOLE 20 MG/1
CAPSULE, DELAYED RELEASE ORAL
Qty: 90 CAPSULE | Refills: 2 | Status: SHIPPED | OUTPATIENT
Start: 2018-04-13 | End: 2020-06-24

## 2018-06-26 ENCOUNTER — OFFICE VISIT (OUTPATIENT)
Dept: FAMILY MEDICINE CLINIC | Facility: CLINIC | Age: 78
End: 2018-06-26
Payer: COMMERCIAL

## 2018-06-26 VITALS
WEIGHT: 183.2 LBS | BODY MASS INDEX: 27.77 KG/M2 | TEMPERATURE: 97.4 F | DIASTOLIC BLOOD PRESSURE: 90 MMHG | HEART RATE: 80 BPM | SYSTOLIC BLOOD PRESSURE: 160 MMHG | RESPIRATION RATE: 16 BRPM | HEIGHT: 68 IN

## 2018-06-26 DIAGNOSIS — I10 BENIGN ESSENTIAL HYPERTENSION: Primary | ICD-10-CM

## 2018-06-26 PROCEDURE — 99213 OFFICE O/P EST LOW 20 MIN: CPT | Performed by: FAMILY MEDICINE

## 2018-06-26 PROCEDURE — 3008F BODY MASS INDEX DOCD: CPT | Performed by: FAMILY MEDICINE

## 2018-06-26 RX ORDER — METOPROLOL TARTRATE 50 MG/1
50 TABLET, FILM COATED ORAL EVERY 12 HOURS SCHEDULED
Qty: 60 TABLET | Refills: 3 | Status: SHIPPED | OUTPATIENT
Start: 2018-06-26 | End: 2018-10-14 | Stop reason: SDUPTHER

## 2018-06-26 RX ORDER — LOSARTAN POTASSIUM AND HYDROCHLOROTHIAZIDE 25; 100 MG/1; MG/1
1 TABLET ORAL EVERY MORNING
Qty: 90 TABLET | Refills: 3 | Status: SHIPPED | OUTPATIENT
Start: 2018-06-26 | End: 2018-12-22 | Stop reason: ALTCHOICE

## 2018-07-01 DIAGNOSIS — N18.30 CHRONIC KIDNEY DISEASE, STAGE III (MODERATE) (HCC): ICD-10-CM

## 2018-07-01 DIAGNOSIS — I10 ESSENTIAL (PRIMARY) HYPERTENSION: ICD-10-CM

## 2018-07-12 ENCOUNTER — HOSPITAL ENCOUNTER (EMERGENCY)
Facility: HOSPITAL | Age: 78
Discharge: HOME | End: 2018-07-12
Admitting: EMERGENCY MEDICINE
Payer: COMMERCIAL

## 2018-07-12 VITALS
OXYGEN SATURATION: 97 % | SYSTOLIC BLOOD PRESSURE: 194 MMHG | BODY MASS INDEX: 27.86 KG/M2 | HEART RATE: 54 BPM | WEIGHT: 183.2 LBS | DIASTOLIC BLOOD PRESSURE: 111 MMHG | RESPIRATION RATE: 18 BRPM | TEMPERATURE: 97.8 F

## 2018-07-12 DIAGNOSIS — T15.91XA ACUTE FOREIGN BODY OF RIGHT EYE, INITIAL ENCOUNTER: Primary | ICD-10-CM

## 2018-07-12 DIAGNOSIS — I10 HIGH BLOOD PRESSURE: ICD-10-CM

## 2018-07-12 PROCEDURE — 99283 EMERGENCY DEPT VISIT LOW MDM: CPT

## 2018-07-12 RX ADMIN — FLUORESCEIN SODIUM AND PROPARACAINE HYDROCHLORIDE 1 DROP: 2.5; 5 SOLUTION/ DROPS OPHTHALMIC at 08:37

## 2018-07-12 NOTE — DISCHARGE INSTRUCTIONS
Hipertensión crónica   LO QUE NECESITA SABER:   La hipertensión es la presión arterial bao  La presión arterial es la fuerza que ejerce la emmy contra las velez de las arterias  La presión arterial normal debería estar a menos de 120/80  La pre-hipertensión estaría entre 120/80 y 139/ 80  La presión arterial bao estaría a 140/90 o más bao  La hipertensión causa que paez presión arterial se eleve tanto que paez corazón se ve forzado a trabajar [employer] de lo normal  [place] puede dañar paez corazón  La hipertensión crónica es patric condición de quentin plazo que usted puede controlar con un estilo de alda viral o con medicamentos  La presión [place] a proteger es órganos efren paez corazón, pulmones, cerebro, y riñones  INSTRUCCIONES SOBRE EL BAO HOSPITALARIA:   Llame al 911 en dario de presentar lo siguiente:   · Usted tiene malestar en el pecho que se siente efren estrujamiento, presión, Kailee Cork o dolor  · Usted se siente confundido o tiene dificultad para hablar  · Repentinamente se siente aturdido o con dificultad para respirar  · Usted tiene dolor o [employer] espalda, [place], [place], abdomen o Sharon Obrien  Regrese a la tomasz de emergencias si:   · Usted tiene un frantz dolor de maurisio o pérdida de la visión  · Usted tiene debilidad en un brazo o en patric pierna  Pregúntele a paez Sherol Mince vitaminas y minerales son adecuados para usted  · Usted se siente mareado, confundido, somnoliento o efren si se fuera a desmayar  · Usted se ha tomado paez medicamento para la presión arterial adrian paez presión arterial todavía está más bao de lo que le indicó paez médico     · Usted tiene preguntas o inquietudes acerca de paez condición o cuidado  Medicamentos:  Es posible que usted necesite alguno de los siguientes:  · Medicamento  podría usarse para ayudar a disminuir la presión arterial  Es posible que necesite más de un tipo de [place]  Betances el medicamento exactamente efren indicado  · Diuréticos  ayudan a eliminar el exceso de líquido que se acumula en el organismo  [place] contribuirá a bajar paez presión arterial  Es posible que orine más seguido mientras nirav gina medicamento  · Los medicamentos para el colesterol  ayudan a bajar los niveles de [place]  Un nivel bajo de colesterol ayuda a prevenir enfermedades cardíacas y facilita el control de la presión arterial      · [place] es medicamentos efren se le haya indicado  Consulte con paez médico si usted pato que paez medicamento no le está ayudando o si presenta efectos secundarios  Infórmele si es alérgico a cualquier medicamento  Mantenga patric lista actualizada de los [place], las vitaminas y los productos herbales que nirav  Incluya los siguientes datos de los medicamentos: cantidad, frecuencia y motivo de administración  Traiga con usted la lista o los envases de la píldoras a es citas de seguimiento  Lleve la lista de los medicamentos con usted en dario de patric emergencia  Acuda a es consultas de control con paez médico según le indicaron  Usted necesitará regresar para medir paez presión arterial y realizar otros exámenes de laboratorio  Anote es preguntas para que se acuerde de hacerlas jon es visitas  Controle la hipertensión crónica:  Hable con paez médico sobre las siguientes recomendaciones y otras formas de controlar la hipertensión:  · Tómese la presión arterial en paez casa  Siéntese y descanse por 5 minutos antes de tomarse la presión arterial  Extienda paez brazo y apóyelo en patric superficie plana  Paez brazo debe estar a la misma altura que paez corazón  Siga las instrucciones que vienen con el monitor para la presión arterial o tensiómetro  Si es posible tome por lo menos 2 lecturas de la presión cada vez  Tómese la presión arterial por lo [place] al día a la misma hora todos los días, patric en la mañana y la otra en la noche  Mantenga un registro de las lecturas de paez presión arterial y llévelo consigo a es consultas  Pregúntele a paez médico cuál debería ser paez presión arterial            · Limite el sodio (la sal) efren se le haya indicado  Demasiado sodio puede afectar el equilibrio de líquidos  Revise las etiquetas para buscar alimentos bajos en sodio o sin sal agregada  Algunos alimentos bajos en sodio utilizan sales de potasio para añadir sabor  Demasiado potasio también puede causar problemas de [place]  Paez médico le dirá qué cantidad de sodio y potasio es [place] para el consumo en un día  Él puede recomendarle que limite el sodio a 2,300 mg al día  · Siga el plan de comidas recomendado por paez médico   Un dietista o médico puede darle más información sobre planes de bajo contenido de sodio o el plan de alimentación DASH (enfoques dietéticos para detener la hipertensión)  El plan DASH es bajo en sodio, grasas saturadas y grasa total  Es alto en potasio, calcio y [place]  · Ejercítese para mantener un peso saludable  Realice actividad física por lo menos 30 minutos al día, la mayoría de los días de la [place]  [place] ayudará a bajar paez presión arterial  Pida más información acerca de un plan de ejercicio adecuado para usted  · [address] [place] [address] estrés  [place] podría ayudarlo a bajar paez presión arterial  Aprenda sobre formas de relajarse, efren respiración profunda o escuchar música  · Limite el consumo de alcohol  Las mujeres deberían limitar el consumo de alcohol a 1 bebida por día  Los hombres deberían limitar el consumo de alcohol a 2 tragos al día  Un trago equivale a 12 onzas de cerveza, 5 onzas de vino o 1 onza y ½ de licor  · No fume  La nicotina y otros químicos en los cigarrillos y cigarros pueden aumentar paez presión arterial y también pueden provocar daño al pulmón  Pida información a paez médico si usted actualmente fuma y necesita ayuda para dejar de fumar  Los cigarrillos electrónicos o tabaco sin humo todavía contienen nicotina  Consulte con paez médico antes de [employer]    © 2017 [address] Information is for End User's use only and may not be sold, redistributed or otherwise used for commercial purposes  All illustrations and images included in CareNotes® are the copyrighted property of A D A M , Inc  or Orion Castillo  Esta información es sólo para uso en educación  Patrick intención no es darle un consejo médico sobre enfermedades o tratamientos  Colsulte con patrick Bary Gavin farmacéutico antes de seguir cualquier régimen médico para saber si es seguro y efectivo para usted  Cuerpo extraño en el ivan   LO QUE NECESITA SABER:   Puede tener dolor, sensibilidad a la chary o visión borrosa jon unos días  INSTRUCCIONES SOBRE EL BAO HOSPITALARIA:   Regrese a la tomasz de emergencias si:   · Pierde patrick visión repentinamente  · Tiene dolor intenso en el ivan  Comuníquese con patrick médico u oftalmólogo si:   · Se le inflama el ivan o la inflamación empeora  · Vania síntomas no mejoran aún después de que le hayan quitado el cuerpo extraño  · Patrick ivan supura un líquido palacios o amarillo  · Usted tiene preguntas o inquietudes acerca de patrick condición o cuidado  Medicamentos:  Es posible que usted necesite alguno de los siguientes:  · Gotas o pomada para los ojos  se le podrían administrar para ayudar a prevenir patric infección y disminuir el dolor  · AINEs (Analgésicos antiinflamatorios no esteroides) efren el ibuprofeno, ayudan a disminuir la inflamación, el dolor y la [place]  Los AINEs pueden causar sangrado estomacal o problemas renales en ciertas personas  Si usted nirav un medicamento anticoagulante, siempre pregúntele a patrick médico si los LATASHA son seguros para usted  Siempre jason la etiqueta de gina medicamento y [place] instrucciones  · Un medicamento con receta para el dolor  podrían ser [place]  Pregunte al médico cómo debe tamiko gina medicamento de forma philippe  Algunos medicamentos recetados para el dolor contienen acetaminofén   No tome otros medicamentos que contengan acetaminofén sin consultarlo con paez médico  Demasiado acetaminofeno puede causar daño al hígado  Los medicamentos recetados para el dolor podrían causar estreñimiento  Pregunte a paez médico efren prevenir o tratar estreñimiento  · [place] es medicamentos efren se le haya indicado  Consulte con paez médico si usted pato que paez medicamento no le está ayudando o si presenta efectos secundarios  Infórmele si es alérgico a algún medicamento  Mantenga patric lista actualizada de los [place], las vitaminas y los productos herbales que nirav  Incluya los siguientes datos de los medicamentos: cantidad, frecuencia y motivo de administración  Traiga con usted la lista o los envases de la píldoras a es citas de seguimiento  Lleve la lista de los medicamentos con usted en dario de patric emergencia  Ayude a que paez ivan sane:   · No se frote el ivan  [place] puede causar más daño o infección  · No use es lentes de contacto hasta que el ivan sane  Consulte con paez médico por cuánto tiempo tiene que seguir esta indicación  · Use lentes para sol según indicaciones  Los lentes para el sol ayudan a proteger el ivan y disminuir la sensibilidad a la chary  Evite un nuevo cuerpo extraño en el ivan:   · Use anteojos de seguridad, protectores oculares o gafas  Estos elementos pueden evitar las lesiones en los ojos  Asegúrese de que los anteojos jasmin envolventes alrededor de paez magdalene  Use estos elementos mientras trabaja con sustancias químicas, pruitt o fluidos corporales, efren la [place]  Use anteojos protectores también mientras hace deportes, efren ráquetbol o natación  No use lentes regulares efren protección  Estos no protegerán es ojos de cuerpos extraños o químicos  · Use lentes de contacto efren se le haya indicado  Lávese las autumn antes de limpiar, ponerse o quitarse es lentes de [place]  [place] y quítese los lentes de contacto correctamente   Limpie y [facility] lentes de contactos efren se le haya indicado para prevenir patric infección o daño ocular  Acuda a es consultas de control con patrick médico u oftalmólogo en 1 o 2 días:  Anote es preguntas para que se acuerde de hacerlas jon es visitas  © 2017 2600 Gustavo Molina Information is for End User's use only and may not be sold, redistributed or otherwise used for commercial purposes  All illustrations and images included in CareNotes® are the copyrighted property of A CHIDI A M , Inc  or Orion Castillo  Esta información es sólo para uso en educación  Patrick intención no es darle un consejo médico sobre enfermedades o tratamientos  Colsulte con patrick Toya Cables farmacéutico antes de seguir cualquier régimen médico para saber si es seguro y efectivo para usted

## 2018-07-12 NOTE — ED PROVIDER NOTES
History  Chief Complaint   Patient presents with    Eye Injury     was under a tree on monday and felt something get into his eye  states he washed his eye out immediately and cleaned eye with a q-tip and vaseline  reports he never visualised a foreign body in his eye but there was blood on q-tip  patient reports pain, blurriness and redness since  denies drainage      Patient is a 71 y/o male with a PMH of hypertension who presents for evaluation of right eye foreign body  He states that 4 days ago he was outside under a shed and tree when he felt something go into his right eye  He states he tried flushing it at home and using a qtip and Vaseline to remove the debris but he was unable to  He complains of foreign body/scratchy feeling, blurry vision, watery eyes, and mild eye redness  He states that he does not currently work with metal  He wears glasses but does not wear contacts  He denies hx of glaucoma or cataracts  He last saw an eye doctor about 8 months ago  He denies fever, chills, nausea, vomiting, diarrhea, chest pain, shortness of breath, abdominal pain, headache, syncope, eye pain  (patient notes that he has not taken his blood pressure medication today and denies symptoms)            Prior to Admission Medications   Prescriptions Last Dose Informant Patient Reported? Taking?   hydrochlorothiazide (HYDRODIURIL) 25 mg tablet  Self No No   Sig: Take 1 tablet (25 mg total) by mouth daily in the early morning   losartan-hydrochlorothiazide (HYZAAR) 100-25 MG per tablet   No No   Sig: Take 1 tablet by mouth every morning   metoprolol tartrate (LOPRESSOR) 50 mg tablet   No No   Sig: Take 1 tablet (50 mg total) by mouth every 12 (twelve) hours   omeprazole (PriLOSEC) 20 mg delayed release capsule  Self No No   Sig: take 1 capsule by mouth daily at bedtime   oxyCODONE (ROXICODONE) 5 mg immediate release tablet  Self No No   Si tablets every 4 hours as needed for pain     tamsulosin (FLOMAX) 0 4 mg  Self Yes No   Sig: Take 0 4 mg by mouth daily with dinner      Facility-Administered Medications: None       Past Medical History:   Diagnosis Date    Arthritis     BPH without urinary obstruction     CKD (chronic kidney disease), stage III     baseline 1 6-1 7    GERD (gastroesophageal reflux disease)     Hyperlipidemia     Hypertension        Past Surgical History:   Procedure Laterality Date    APPENDECTOMY      COLONOSCOPY  2017    FRACTURE SURGERY      IN RECONSTR TOTAL SHOULDER IMPLANT Right 12/5/2017    Procedure: ARTHROPLASTY SHOULDER REVERSE with OPEN CYST EXCISION;  Surgeon: Mamta Dudley MD;  [facility];  Service: Orthopedics    SHOULDER SURGERY      WRIST SURGERY         Family History   Problem Relation Age of Onset    No Known Problems Mother     Hypertension Father     Arthritis Family      I have reviewed and agree with the history as documented  Social History   Substance Use Topics    Smoking status: Former Smoker     Packs/day: 1 00     Quit date: 1980    Smokeless tobacco: Never Used    Alcohol use No        Review of Systems   Constitutional: Negative for chills and fever  HENT: Negative for congestion and sore throat  Eyes: Positive for redness  Negative for itching  Right eye foreign body sensation, redness, and watering  Mild blurry vision    Respiratory: Negative for shortness of breath  Cardiovascular: Negative for chest pain  Gastrointestinal: Negative for abdominal pain, constipation, diarrhea and vomiting  Skin: Negative for rash  Neurological: Negative for syncope and headaches  All other systems reviewed and are negative  Physical Exam  Physical Exam   Constitutional: He appears well-developed and well-nourished  He is active  Non-toxic appearance  No distress  HENT:   Head: Normocephalic and atraumatic     Right Ear: External ear normal    Left Ear: External ear normal    Nose: Nose normal    Eyes: EOM are normal  Pupils are equal, round, and reactive to light  Right eye exhibits no discharge and no exudate  Foreign body present in the right eye  Right eye with small circular black foreign body noted  Mild injection and tearing  No purulent discharge  Fluorescein dye evaluation also reveals foreign body  No alfonso sign  No other abnormal dye uptake  Patient notes improvement in foreign body sensation/pain with proparacaine drop  Neck: Normal range of motion  Neck supple  Cardiovascular: Normal rate, regular rhythm and normal heart sounds  Exam reveals no gallop and no friction rub  No murmur heard  Pulmonary/Chest: Effort normal and breath sounds normal  No respiratory distress  He has no wheezes  He has no rales  Neurological: He is alert  He is not disoriented  GCS eye subscore is 4  GCS verbal subscore is 5  GCS motor subscore is 6  Skin: Skin is warm and dry  He is not diaphoretic  Psychiatric: He has a normal mood and affect  His behavior is normal    Nursing note and vitals reviewed  Vital Signs  ED Triage Vitals [07/12/18 0735]   Temperature Pulse Respirations Blood Pressure SpO2   97 8 °F (36 6 °C) 67 18 (!) 203/110 97 %      Temp Source Heart Rate Source Patient Position - Orthostatic VS BP Location FiO2 (%)   Oral Monitor Sitting Right arm --      Pain Score       7           Vitals:    07/12/18 0735 07/12/18 0917   BP: (!) 203/110 (!) 194/111   Pulse: 67 (!) 54   Patient Position - Orthostatic VS: Sitting        Visual Acuity  Visual Acuity      Most Recent Value   Visual acuity R eye is   20/50   Visual acuity Left eye is   20/40   Wearing corrective eyewear/lenses?    Yes          ED Medications  Medications   Fluorescein-Proparacaine (FLUCAINE) 0 25-0 5 % ophthalmic solution 1 drop (1 drop Right Eye Given by Other 7/12/18 1654)       Diagnostic Studies  Results Reviewed     None                 No orders to display              Procedures  Procedures       Phone Contacts  ED Phone Contact    ED Course                               MDM  Number of Diagnoses or Management Options  Acute foreign body of right eye, initial encounter:   High blood pressure:   Diagnosis management comments: Right eye foreign body- proparacaine and flourescein dye used  A sterile qtip was used to remove a small piece of the black foreign body  Patient notes improvement of symptoms but there still appears to be a small embedded piece of debris to the right eye  Subsequent attempts to remove the debris with qtip and flushing the eye were unsuccessful  Contacted the [facility] [place] for sight who will get him in for an appointment today (7/12/18) at 11am at their [place] office  Discussed this plan with the patient who is in agreement  Instructed to arrive 15 mins early for the appointment  Will hold off on antibiotic drops as he is seeing ophthalmology today  Discussed his high blood pressure reading here  He states he has not taken his medications today  He denies symptoms  Reviewed previous medical record and he has had multiple high blood pressure readings ( 160/90s, 158/104, 191/117)  Explained to the patient that he needs to take his medication today and follow up with his doctor regarding his high blood pressure  Discussed strict return precautions if symptoms worsen or new symptoms arise  Patient states understanding and agrees with plan          Amount and/or Complexity of Data Reviewed  Review and summarize past medical records: yes    Risk of Complications, Morbidity, and/or Mortality  Presenting problems: low  Diagnostic procedures: low  Management options: low    Patient Progress  Patient progress: stable    CritCare Time    Disposition  Final diagnoses:   Acute foreign body of right eye, initial encounter   High blood pressure     Time reflects when diagnosis was documented in both MDM as applicable and the Disposition within this note     Time User Action Codes Description Comment    7/12/2018  9:21 AM Fatimah Mandujano Luzma Rojas Erika Durand Acute foreign body of right eye, initial encounter     7/12/2018  9:21 AM Asher Lindsey Add [I10] High blood pressure       ED Disposition     ED Disposition Condition Comment    Discharge  Oliver SEBASTIAN Core discharge to home/self care  Condition at discharge: Good        Follow-up Information     Follow up With Specialties Details Why [address] Soria MD Family Medicine, Obstetrics and Gynecology, Obstetrics, Gynecology Schedule an appointment as soon as possible for a visit in 1 day To discuss your high blood pressure reading  Take your blood pressure medication as directed and recheck blood pressure, keep a log of blood pressures  [address] for [facility]   Go to You have an appointment today (7/12/18) at [address] [place] [address] 15 minutes early for your appointment  [address] [place] [address]   [phone]          Discharge Medication List as of 7/12/2018  9:24 AM      CONTINUE these medications which have NOT CHANGED    Details   hydrochlorothiazide (HYDRODIURIL) 25 mg tablet Take 1 tablet (25 mg total) by mouth daily in the early morning, Starting Fri 2/23/2018, Normal      losartan-hydrochlorothiazide (HYZAAR) 100-25 MG per tablet Take 1 tablet by mouth every morning, Starting Tue 6/26/2018, Normal      metoprolol tartrate (LOPRESSOR) 50 mg tablet Take 1 tablet (50 mg total) by mouth every 12 (twelve) hours, Starting Tue 6/26/2018, Normal      omeprazole (PriLOSEC) 20 mg delayed release capsule take 1 capsule by mouth daily at bedtime, Normal      oxyCODONE (ROXICODONE) 5 mg immediate release tablet 1 tablets every 4 hours as needed for pain  , Print      tamsulosin (FLOMAX) 0 4 mg Take 0 4 mg by mouth daily with dinner, Historical Med           No discharge procedures on file      ED Provider  Electronically Signed by           Kofi Wesley PA-C  07/12/18 1021

## 2018-10-14 DIAGNOSIS — I10 BENIGN ESSENTIAL HYPERTENSION: ICD-10-CM

## 2018-10-17 RX ORDER — METOPROLOL TARTRATE 50 MG/1
50 TABLET, FILM COATED ORAL EVERY 12 HOURS SCHEDULED
Qty: 60 TABLET | Refills: 3 | Status: SHIPPED | OUTPATIENT
Start: 2018-10-17 | End: 2019-02-02 | Stop reason: SDUPTHER

## 2018-11-07 ENCOUNTER — TELEPHONE (OUTPATIENT)
Dept: NEPHROLOGY | Facility: CLINIC | Age: 78
End: 2018-11-07

## 2018-11-07 NOTE — TELEPHONE ENCOUNTER
Patient daughter Gwen Santos called the office stating that patient is c/o kidney pain and lower back pain, she would like to know if patient should be seen sooner than January  She advised to contact her instead of patient  Rosi [phone]

## 2018-11-08 NOTE — TELEPHONE ENCOUNTER
I have spoke with patient's daughter, states that her father is having lower back pain for a couple of weeks  Recommend to be seen by his family doctor to find out what is the cause of the pain, advised that if the pain became severe to take him to the emergency department for urgent evaluation  He will return to my office for follow-up around January next year    Thanks,

## 2018-11-09 ENCOUNTER — TELEPHONE (OUTPATIENT)
Dept: FAMILY MEDICINE CLINIC | Facility: CLINIC | Age: 78
End: 2018-11-09

## 2018-11-09 NOTE — TELEPHONE ENCOUNTER
Called and spoke with patients daughter isabel and I was able to get patient in on Monday 11/12  with Dr Arthur Alfonso

## 2018-11-09 NOTE — TELEPHONE ENCOUNTER
----- Message from Keyana Jay MD sent at 11/9/2018 10:45 AM EST -----  Regarding: APPT  Per Nephrologist, please have patient make appt to be seen for acute visit (I am on vacation, please have him see another provider asap)

## 2018-11-11 PROBLEM — M54.9 ACUTE BACK PAIN: Status: ACTIVE | Noted: 2018-11-11

## 2018-11-12 ENCOUNTER — OFFICE VISIT (OUTPATIENT)
Dept: FAMILY MEDICINE CLINIC | Facility: CLINIC | Age: 78
End: 2018-11-12
Payer: COMMERCIAL

## 2018-11-12 VITALS
RESPIRATION RATE: 14 BRPM | WEIGHT: 180 LBS | TEMPERATURE: 96.6 F | SYSTOLIC BLOOD PRESSURE: 130 MMHG | BODY MASS INDEX: 27.28 KG/M2 | HEART RATE: 76 BPM | HEIGHT: 68 IN | DIASTOLIC BLOOD PRESSURE: 70 MMHG

## 2018-11-12 DIAGNOSIS — M54.9 CHRONIC MIDLINE BACK PAIN, UNSPECIFIED BACK LOCATION: Primary | ICD-10-CM

## 2018-11-12 DIAGNOSIS — G89.29 CHRONIC MIDLINE BACK PAIN, UNSPECIFIED BACK LOCATION: Primary | ICD-10-CM

## 2018-11-12 PROCEDURE — 1036F TOBACCO NON-USER: CPT | Performed by: FAMILY MEDICINE

## 2018-11-12 PROCEDURE — 3008F BODY MASS INDEX DOCD: CPT | Performed by: FAMILY MEDICINE

## 2018-11-12 PROCEDURE — 99213 OFFICE O/P EST LOW 20 MIN: CPT | Performed by: FAMILY MEDICINE

## 2018-11-12 PROCEDURE — 1160F RVW MEDS BY RX/DR IN RCRD: CPT | Performed by: FAMILY MEDICINE

## 2018-11-12 RX ORDER — A/SINGAPORE/GP1908/2015 IVR-180 (H1N1) (AN A/MICHIGAN/45/2015 (H1N1)PDM09-LIKE VIRUS), A/HONG KONG/4801/2014, NYMC X-263B (H3N2) (AN A/HONG KONG/4801/2014-LIKE VIRUS), AND B/BRISBANE/60/2008, WILD TYPE (A B/BRISBANE/60/2008-LIKE VIRUS) 15; 15; 15 UG/.5ML; UG/.5ML; UG/.5ML
INJECTION, SUSPENSION INTRAMUSCULAR
Refills: 0 | COMMUNITY
Start: 2018-09-15 | End: 2020-01-07 | Stop reason: HOSPADM

## 2018-11-12 RX ORDER — LOSARTAN POTASSIUM 50 MG/1
25 TABLET ORAL DAILY
Refills: 0 | COMMUNITY
Start: 2018-08-29 | End: 2019-01-09 | Stop reason: SDUPTHER
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
## 2018-12-21 ENCOUNTER — TELEPHONE (OUTPATIENT)
Dept: FAMILY MEDICINE CLINIC | Facility: CLINIC | Age: 78
End: 2018-12-21

## 2018-12-21 DIAGNOSIS — I10 BENIGN ESSENTIAL HYPERTENSION: Primary | ICD-10-CM

## 2018-12-21 DIAGNOSIS — I10 HYPERTENSION, UNSPECIFIED TYPE: ICD-10-CM

## 2018-12-22 RX ORDER — HYDROCHLOROTHIAZIDE 25 MG/1
25 TABLET ORAL
Qty: 90 TABLET | Refills: 3 | Status: SHIPPED | OUTPATIENT
Start: 2018-12-22 | End: 2019-01-09 | Stop reason: SDUPTHER

## 2018-12-22 NOTE — TELEPHONE ENCOUNTER
Refilled HCTZ 25 mg, appears insurance may not cover it, let me know if he needs 12 5 mg ordered as insurance appears to cover that one

## 2019-01-01 DIAGNOSIS — N18.30 CHRONIC KIDNEY DISEASE, STAGE III (MODERATE) (HCC): ICD-10-CM

## 2019-01-02 ENCOUNTER — APPOINTMENT (OUTPATIENT)
Dept: LAB | Facility: HOSPITAL | Age: 79
End: 2019-01-02
Attending: INTERNAL MEDICINE
Payer: COMMERCIAL

## 2019-01-02 DIAGNOSIS — G89.29 CHRONIC MIDLINE BACK PAIN, UNSPECIFIED BACK LOCATION: ICD-10-CM

## 2019-01-02 DIAGNOSIS — I10 ESSENTIAL (PRIMARY) HYPERTENSION: ICD-10-CM

## 2019-01-02 DIAGNOSIS — M54.9 CHRONIC MIDLINE BACK PAIN, UNSPECIFIED BACK LOCATION: ICD-10-CM

## 2019-01-02 DIAGNOSIS — R94.6 ABNORMAL RESULTS OF THYROID FUNCTION STUDIES: ICD-10-CM

## 2019-01-02 DIAGNOSIS — N18.30 CHRONIC KIDNEY DISEASE, STAGE III (MODERATE) (HCC): ICD-10-CM

## 2019-01-02 LAB
ALBUMIN SERPL BCP-MCNC: 3.1 G/DL (ref 3.5–5)
ANION GAP SERPL CALCULATED.3IONS-SCNC: 5 MMOL/L (ref 4–13)
BASOPHILS # BLD AUTO: 0.01 THOUSANDS/ΜL (ref 0–0.1)
BASOPHILS NFR BLD AUTO: 0 % (ref 0–1)
BUN SERPL-MCNC: 33 MG/DL (ref 5–25)
CALCIUM SERPL-MCNC: 8.6 MG/DL (ref 8.3–10.1)
CHLORIDE SERPL-SCNC: 112 MMOL/L (ref 100–108)
CO2 SERPL-SCNC: 26 MMOL/L (ref 21–32)
CREAT SERPL-MCNC: 1.75 MG/DL (ref 0.6–1.3)
CREAT UR-MCNC: 98.6 MG/DL
EOSINOPHIL # BLD AUTO: 0.27 THOUSAND/ΜL (ref 0–0.61)
EOSINOPHIL NFR BLD AUTO: 4 % (ref 0–6)
ERYTHROCYTE [DISTWIDTH] IN BLOOD BY AUTOMATED COUNT: 13.6 % (ref 11.6–15.1)
ERYTHROCYTE [SEDIMENTATION RATE] IN BLOOD: 16 MM/HOUR (ref 0–10)
GFR SERPL CREATININE-BSD FRML MDRD: 37 ML/MIN/1.73SQ M
GLUCOSE P FAST SERPL-MCNC: 93 MG/DL (ref 65–99)
HCT VFR BLD AUTO: 44.5 % (ref 36.5–49.3)
HGB BLD-MCNC: 14.6 G/DL (ref 12–17)
IMM GRANULOCYTES # BLD AUTO: 0.04 THOUSAND/UL (ref 0–0.2)
IMM GRANULOCYTES NFR BLD AUTO: 1 % (ref 0–2)
LYMPHOCYTES # BLD AUTO: 1.42 THOUSANDS/ΜL (ref 0.6–4.47)
LYMPHOCYTES NFR BLD AUTO: 21 % (ref 14–44)
MCH RBC QN AUTO: 30.1 PG (ref 26.8–34.3)
MCHC RBC AUTO-ENTMCNC: 32.8 G/DL (ref 31.4–37.4)
MCV RBC AUTO: 92 FL (ref 82–98)
MONOCYTES # BLD AUTO: 0.87 THOUSAND/ΜL (ref 0.17–1.22)
MONOCYTES NFR BLD AUTO: 13 % (ref 4–12)
NEUTROPHILS # BLD AUTO: 4.13 THOUSANDS/ΜL (ref 1.85–7.62)
NEUTS SEG NFR BLD AUTO: 61 % (ref 43–75)
NRBC BLD AUTO-RTO: 0 /100 WBCS
PHOSPHATE SERPL-MCNC: 3.4 MG/DL (ref 2.3–4.1)
PLATELET # BLD AUTO: 167 THOUSANDS/UL (ref 149–390)
PMV BLD AUTO: 10.4 FL (ref 8.9–12.7)
POTASSIUM SERPL-SCNC: 3.7 MMOL/L (ref 3.5–5.3)
PROT UR-MCNC: 67 MG/DL
PROT/CREAT UR: 0.68 MG/G{CREAT} (ref 0–0.1)
PTH-INTACT SERPL-MCNC: 116.8 PG/ML (ref 18.4–80.1)
RBC # BLD AUTO: 4.85 MILLION/UL (ref 3.88–5.62)
SODIUM SERPL-SCNC: 143 MMOL/L (ref 136–145)
T4 FREE SERPL-MCNC: 0.94 NG/DL (ref 0.76–1.46)
TSH SERPL DL<=0.05 MIU/L-ACNC: 4.82 UIU/ML (ref 0.36–3.74)
WBC # BLD AUTO: 6.74 THOUSAND/UL (ref 4.31–10.16)

## 2019-01-02 PROCEDURE — 86430 RHEUMATOID FACTOR TEST QUAL: CPT

## 2019-01-02 PROCEDURE — 80069 RENAL FUNCTION PANEL: CPT

## 2019-01-02 PROCEDURE — 86200 CCP ANTIBODY: CPT

## 2019-01-02 PROCEDURE — 36415 COLL VENOUS BLD VENIPUNCTURE: CPT

## 2019-01-02 PROCEDURE — 82570 ASSAY OF URINE CREATININE: CPT

## 2019-01-02 PROCEDURE — 83970 ASSAY OF PARATHORMONE: CPT

## 2019-01-02 PROCEDURE — 85652 RBC SED RATE AUTOMATED: CPT

## 2019-01-02 PROCEDURE — 84439 ASSAY OF FREE THYROXINE: CPT

## 2019-01-02 PROCEDURE — 84443 ASSAY THYROID STIM HORMONE: CPT

## 2019-01-02 PROCEDURE — 85025 COMPLETE CBC W/AUTO DIFF WBC: CPT

## 2019-01-02 PROCEDURE — 84156 ASSAY OF PROTEIN URINE: CPT

## 2019-01-03 LAB — RHEUMATOID FACT SER QL LA: NEGATIVE

## 2019-01-04 LAB — CCP IGA+IGG SERPL IA-ACNC: 11 UNITS (ref 0–19)

## 2019-01-09 ENCOUNTER — OFFICE VISIT (OUTPATIENT)
Dept: NEPHROLOGY | Facility: CLINIC | Age: 79
End: 2019-01-09
Payer: COMMERCIAL

## 2019-01-09 VITALS
HEIGHT: 68 IN | SYSTOLIC BLOOD PRESSURE: 155 MMHG | HEART RATE: 74 BPM | DIASTOLIC BLOOD PRESSURE: 80 MMHG | BODY MASS INDEX: 28.07 KG/M2 | WEIGHT: 185.2 LBS

## 2019-01-09 DIAGNOSIS — I10 BENIGN ESSENTIAL HYPERTENSION: ICD-10-CM

## 2019-01-09 DIAGNOSIS — N18.30 BENIGN HYPERTENSION WITH CKD (CHRONIC KIDNEY DISEASE) STAGE III (HCC): Primary | ICD-10-CM

## 2019-01-09 DIAGNOSIS — N40.0 BENIGN PROSTATIC HYPERPLASIA WITHOUT LOWER URINARY TRACT SYMPTOMS: ICD-10-CM

## 2019-01-09 DIAGNOSIS — I12.9 BENIGN HYPERTENSION WITH CKD (CHRONIC KIDNEY DISEASE) STAGE III (HCC): Primary | ICD-10-CM

## 2019-01-09 PROCEDURE — 99214 OFFICE O/P EST MOD 30 MIN: CPT | Performed by: INTERNAL MEDICINE

## 2019-01-09 RX ORDER — LOSARTAN POTASSIUM AND HYDROCHLOROTHIAZIDE 25; 100 MG/1; MG/1
1 TABLET ORAL DAILY
COMMUNITY
End: 2019-04-05 | Stop reason: SDUPTHER

## 2019-01-09 NOTE — PATIENT INSTRUCTIONS
I want you to have blood and urine test in 6 months, based on the results renal function remains stable I would like to see you back in the office in 1 year  Continue with a low-salt diet  Continue taking your blood pressure medications instructed:  Losartan-hydrochlorothiazide 100-25 mg 1 tablet daily, metoprolol 50 mg 1 tablet twice a day  Avoid NSAIDs (ibuprofen, Motrin, Advil, Aleve, naproxen)  Okay to take Tylenol or paracetamol or acetaminophen as needed for pain

## 2019-01-09 NOTE — PROGRESS NOTES
OFFICE FOLLOW UP - Nephrology   [facility] A Core 70 y o  male MRN: [id number]       ASSESSMENT and PLAN:  [facility] was seen today for follow-up  Diagnoses and all orders for this visit:    Benign hypertension with CKD (chronic kidney disease) stage III ([facility])  -     Protein / creatinine ratio, urine  -     PTH, intact  -     Renal function panel    Benign essential hypertension    Benign prostatic hyperplasia without lower urinary tract symptoms        65-year-old gentleman with known history of chronic kidney disease stage IIIB with previous creatinine around 1 6-1 7 back since 2016, hypertension, BPH without urinary symptoms who returns to the office for follow-up  1  Chronic kidney disease stage IIIB most recent creatinine 1 75  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  Renal function fairly stable for last 2 years  I would like to repeat blood test in 6 months and if renal function remains stable I would like to see her back in the office in 1 year  2  Hypertension today blood pressure elevated  After talking with patient's granddaughter by phone see him he was not taking metoprolol as instructed  Recommend to continue taking losartan with hydrochlorothiazide 100-25 mg 1 tablet daily as well as metoprolol 50 mg 1 tablet twice a day  Follow low-salt diet  3  Prostatomegaly, continue follow-up with Urology, currently without symptoms  Patient taking Flomax at bedtime  4  Mineral bone disease, PTH acceptable, follow labs in 6 months  Patient Instructions   I want you to have blood and urine test in 6 months, based on the results renal function remains stable I would like to see you back in the office in 1 year  Continue with a low-salt diet  Continue taking your blood pressure medications instructed:  Losartan-hydrochlorothiazide 100-25 mg 1 tablet daily, metoprolol 50 mg 1 tablet twice a day  Avoid NSAIDs (ibuprofen, Motrin, Advil, Aleve, naproxen)    Okay to take Tylenol or paracetamol or acetaminophen as needed for pain  HPI: Renetta Mann is a 70 y o  male who is here for Follow-up    Last time seen was January 2008, today returns for year follow-up  Since our last visit, went to the emergency department July 2018 for an acute following by the on right eye that was removed  Patient currently has no complaints at this time and is feeling well  Patient denies any chest pain, shortness of breath and swelling  He  States that he states his blood pressure medication  The last blood work was done on 01/02/2019, which we have reviewed together  Most recent serum creatinine 1 75 with an estimated GFR of 37  Patient called her granddaughter and she was able to confirm medication list, he is supposed to be taking losartan with hydrochlorothiazide 100/25 mg 1 tablet daily as well as metoprolol 50 mg 1 tablet twice a day but he is currently taking only metoprolol 1 tablet daily  Denies tobacco abuse  Denies NSAID use  ROS:   All the systems were reviewed and were negative except as documented on the HPI  Allergies: Patient has no known allergies  Medications:   Current Outpatient Prescriptions:     FLUAD 0 5 ML GEORGIE, inject 0 5 milliliter intramuscularly, Disp: , Rfl: 0    losartan-hydrochlorothiazide (HYZAAR) 100-25 MG per tablet, Take 1 tablet by mouth daily, Disp: , Rfl:     metoprolol tartrate (LOPRESSOR) 50 mg tablet, TAKE 1 TABLET (50 MG TOTAL) BY MOUTH EVERY 12 (TWELVE) HOURS, Disp: 60 tablet, Rfl: 3    omeprazole (PriLOSEC) 20 mg delayed release capsule, take 1 capsule by mouth daily at bedtime, Disp: 90 capsule, Rfl: 2    tamsulosin (FLOMAX) 0 4 mg, Take 0 4 mg by mouth daily with dinner, Disp: , Rfl:     oxyCODONE (ROXICODONE) 5 mg immediate release tablet, 1 tablets every 4 hours as needed for pain   (Patient not taking: Reported on 1/9/2019 ), Disp: 30 tablet, Rfl: 0    SHINGRIX 50 MCG SUSR, inject 0 5 milliliter intramuscularly, Disp: , Rfl: 0    Past Medical History:   Diagnosis Date    Arthritis     BPH without urinary obstruction     CKD (chronic kidney disease), stage III (HCC)     baseline 1 6-1 7    GERD (gastroesophageal reflux disease)     Hyperlipidemia     Hypertension      Past Surgical History:   Procedure Laterality Date    APPENDECTOMY      COLONOSCOPY  2017    FRACTURE SURGERY      NM RECONSTR TOTAL SHOULDER IMPLANT Right 12/5/2017    Procedure: ARTHROPLASTY SHOULDER REVERSE with OPEN CYST EXCISION;  Surgeon: Clinton Johnson MD;  [facility];  Service: Orthopedics    SHOULDER SURGERY      WRIST SURGERY       Family History   Problem Relation Age of Onset    No Known Problems Mother     Hypertension Father     Arthritis Family       reports that he quit smoking about 39 years ago  He smoked 1 00 pack per day  He has never used smokeless tobacco  He reports that he does not drink alcohol or use drugs  Physical Exam:   Vitals:    01/09/19 1503   BP: 155/80   BP Location: Left arm   Patient Position: Sitting   Pulse: 74   Weight: 84 kg (185 lb 3 2 oz)   Height: 5' 8" (1 727 m)     Body mass index is 28 16 kg/m²      General: cooperative, in not acute distress  Eyes: conjunctivae pink, anicteric sclerae  ENT: lips and mucous membranes moist  Neck: supple, no JVD  Chest: clear breath sounds bilateral, no crackles, ronchus or wheezings  CVS: distinct S1 & S2, normal rate, regular rhythm  Abdomen: soft, non-tender, non-distended, normoactive bowel sounds  Back: no CVA tenderness  Extremities: no edema of both legs  Skin: no rash  Neuro: awake, alert, oriented      Lab Results:  Results for orders placed or performed in visit on 01/02/19   CBC and differential   Result Value Ref Range    WBC 6 74 4 31 - 10 16 Thousand/uL    RBC 4 85 3 88 - 5 62 Million/uL    Hemoglobin 14 6 12 0 - 17 0 g/dL    Hematocrit 44 5 36 5 - 49 3 %    MCV 92 82 - 98 fL    MCH 30 1 26 8 - 34 3 pg    MCHC 32 8 31 4 - 37 4 g/dL    RDW 13 6 11 6 - 15 1 %    MPV 10 4 8 9 - 12 7 fL    Platelets 624 934 - 526 Thousands/uL    nRBC 0 /100 WBCs    Neutrophils Relative 61 43 - 75 %    Immat GRANS % 1 0 - 2 %    Lymphocytes Relative 21 14 - 44 %    Monocytes Relative 13 (H) 4 - 12 %    Eosinophils Relative 4 0 - 6 %    Basophils Relative 0 0 - 1 %    Neutrophils Absolute 4 13 1 85 - 7 62 Thousands/µL    Immature Grans Absolute 0 04 0 00 - 0 20 Thousand/uL    Lymphocytes Absolute 1 42 0 60 - 4 47 Thousands/µL    Monocytes Absolute 0 87 0 17 - 1 22 Thousand/µL    Eosinophils Absolute 0 27 0 00 - 0 61 Thousand/µL    Basophils Absolute 0 01 0 00 - 0 10 Thousands/µL   PTH, intact   Result Value Ref Range     8 (H) 18 4 - 80 1 pg/mL   Renal function panel   Result Value Ref Range    Albumin 3 1 (L) 3 5 - 5 0 g/dL    Calcium 8 6 8 3 - 10 1 mg/dL    Phosphorus 3 4 2 3 - 4 1 mg/dL    BUN 33 (H) 5 - 25 mg/dL    Creatinine 1 75 (H) 0 60 - 1 30 mg/dL    Sodium 143 136 - 145 mmol/L    Potassium 3 7 3 5 - 5 3 mmol/L    Chloride 112 (H) 100 - 108 mmol/L    CO2 26 21 - 32 mmol/L    ANION GAP 5 4 - 13 mmol/L    eGFR 37 ml/min/1 73sq m    Glucose, Fasting 93 65 - 99 mg/dL   Protein / creatinine ratio, urine   Result Value Ref Range    Creatinine, Ur 98 6 mg/dL    Protein Urine Random 67 mg/dL    Prot/Creat Ratio, Ur 0 68 (H) 0 00 - 0 10   TSH, 3rd generation with T4 reflex   Result Value Ref Range    TSH 3RD GENERATON 4 820 (H) 0 358 - 3 740 uIU/mL   RF Screen w/ Reflex to Titer   Result Value Ref Range    Rheumatoid Factor Negative Negative   Cyclic citrul peptide antibody, IgG   Result Value Ref Range    Cyclic Citrullin Peptide Ab 11 0 - 19 units   Sedimentation rate, automated   Result Value Ref Range    Sed Rate 16 (H) 0 - 10 mm/hour   T4, free   Result Value Ref Range    Free T4 0 94 0 76 - 1 46 ng/dL         Portions of the record may have been created with voice recognition software   Occasional wrong word or "sound a like" substitutions may have occurred due to the inherent limitations of voice recognition software  Read the chart carefully and recognize, using context, where substitutions have occurred  If you have any questions, please contact the dictating provider

## 2019-02-02 DIAGNOSIS — I10 BENIGN ESSENTIAL HYPERTENSION: ICD-10-CM

## 2019-02-05 RX ORDER — METOPROLOL TARTRATE 50 MG/1
50 TABLET, FILM COATED ORAL EVERY 12 HOURS SCHEDULED
Qty: 60 TABLET | Refills: 3 | Status: SHIPPED | OUTPATIENT
Start: 2019-02-05 | End: 2019-04-05 | Stop reason: SDUPTHER

## 2019-02-27 ENCOUNTER — TELEPHONE (OUTPATIENT)
Dept: NEPHROLOGY | Facility: CLINIC | Age: 79
End: 2019-02-27

## 2019-02-27 DIAGNOSIS — N18.30 BENIGN HYPERTENSION WITH CKD (CHRONIC KIDNEY DISEASE) STAGE III (HCC): Primary | ICD-10-CM

## 2019-02-27 DIAGNOSIS — I12.9 BENIGN HYPERTENSION WITH CKD (CHRONIC KIDNEY DISEASE) STAGE III (HCC): Primary | ICD-10-CM

## 2019-02-27 NOTE — TELEPHONE ENCOUNTER
Pt daughter called the office and stated that pt was seen by urology today and was advised to have him BP medications checked, BP was elevated  I went over pt medications and he his only taking Metoprolol 50 mg bid, Losartan-HCTZ 100-25 mg daily nothing else  Please advise

## 2019-02-28 RX ORDER — HYDRALAZINE HYDROCHLORIDE 25 MG/1
25 TABLET, FILM COATED ORAL 3 TIMES DAILY
Qty: 90 TABLET | Refills: 5 | Status: SHIPPED | OUTPATIENT
Start: 2019-02-28 | End: 2019-04-05 | Stop reason: SDUPTHER

## 2019-02-28 NOTE — TELEPHONE ENCOUNTER
Padmaja Bolaños has been scheduled for a blood pressure check on 3/11 with [facility]

## 2019-02-28 NOTE — TELEPHONE ENCOUNTER
I have called both numbers to try to contact patient and or his family, no body answer, left messages in both numbers to call us back  I want to double check exactly what medication he is taking and I would like to bring patient back for blood pressure check in to the office by [facility]  and based on readings side about adding another agent  Please contact patient and convey same message    Thanks

## 2019-03-14 ENCOUNTER — TELEPHONE (OUTPATIENT)
Dept: NEPHROLOGY | Facility: CLINIC | Age: 79
End: 2019-03-14

## 2019-03-14 NOTE — TELEPHONE ENCOUNTER
----- Message from Sonam Edmonds RN sent at 3/13/2019  6:57 AM EDT -----  Regarding: FW: No-Show  Salvador,     Dr Gina Ramey would like this patient rescheduled  Thank you   ----- Message -----  From: Iman Dawson MD  Sent: 3/12/2019   9:38 AM  To: Sonam Edmonds RN  Subject: RE: No-Show                                      Thanks for letting me know,  Please make sure they reschedule visit  Thanks      ----- Message -----  From: Sonam Edmonds RN  Sent: 3/11/2019  12:01 PM  To: Iman Dawson MD  Subject: No-Show                                          Hello,     I wanted to notify you that Mr Sharpe no-showed for his blood pressure check this morning       Thank you,  Backup[employer]

## 2019-03-18 ENCOUNTER — TELEPHONE (OUTPATIENT)
Dept: NEPHROLOGY | Facility: CLINIC | Age: 79
End: 2019-03-18

## 2019-03-19 NOTE — TELEPHONE ENCOUNTER
Yes, please keep trying, call emergency contacts also and send a letter to patient as per our office protocol   Thanks

## 2019-03-19 NOTE — TELEPHONE ENCOUNTER
Per dr Sammi Baxter he wanted pt to go to ER due to his bp being so high  I called pt's daytonther back Rosi in regards to his bp  She stated she was out of town currently in [address] that she would be unable to take  Him at this time but yesterday was when his bp was high and Mr green refused going to the hospital  We will see him tomorrow 03 20 at 11am with Ravi Myersmi was going to try and have her father go to ER she stated

## 2019-03-19 NOTE — TELEPHONE ENCOUNTER
Called emergency contact, Patient's Mart Amas [phone] in regards to rescheduling her father for a blood pressure check with [facility]  for tomorrow, March 20th, 2019 at 11am   Rosi wanted to bring to our attention that her father's bp was 218/180-170 recently  Patient refused going to the hospital and he was suppose to call our office but he never did  I notified Dr Marcelo Hart

## 2019-03-19 NOTE — TELEPHONE ENCOUNTER
Tried multiple times to call and get a hold of patient  Left multiple messages in regards for a call back to schedule  Patient for his blood pressure check  Do you want me to keep trying? Please advise

## 2019-03-20 ENCOUNTER — TELEPHONE (OUTPATIENT)
Dept: NEPHROLOGY | Facility: CLINIC | Age: 79
End: 2019-03-20

## 2019-03-20 NOTE — TELEPHONE ENCOUNTER
Patient was rescheduled for 03/20 at 11am with [facility]  for a BP check  Patient no showed again  I notified Dr Floyd Hernandez

## 2019-03-20 NOTE — TELEPHONE ENCOUNTER
LM for RTC to office  Patient has missed the last few blood pressure checks with me and his blood pressures have been very elevated per chart review  Would like to check on patients current blood pressure and ensure he is being compliant with his medications       Currently he should be taking  Losartan/HCTZ 100-125 mg 1 tab PO QD  Metoprolol 50 mg 1 tablet BID  Hydralazine 25 mg 1 tablet TID    Thank you

## 2019-04-04 ENCOUNTER — TELEPHONE (OUTPATIENT)
Dept: FAMILY MEDICINE CLINIC | Facility: CLINIC | Age: 79
End: 2019-04-04

## 2019-04-05 ENCOUNTER — OFFICE VISIT (OUTPATIENT)
Dept: FAMILY MEDICINE CLINIC | Facility: CLINIC | Age: 79
End: 2019-04-05

## 2019-04-05 VITALS
HEART RATE: 48 BPM | SYSTOLIC BLOOD PRESSURE: 160 MMHG | BODY MASS INDEX: 27.68 KG/M2 | WEIGHT: 182.6 LBS | RESPIRATION RATE: 16 BRPM | TEMPERATURE: 96 F | DIASTOLIC BLOOD PRESSURE: 94 MMHG | HEIGHT: 68 IN

## 2019-04-05 DIAGNOSIS — I10 BENIGN ESSENTIAL HYPERTENSION: Primary | ICD-10-CM

## 2019-04-05 DIAGNOSIS — N18.30 BENIGN HYPERTENSION WITH CKD (CHRONIC KIDNEY DISEASE) STAGE III (HCC): ICD-10-CM

## 2019-04-05 DIAGNOSIS — I12.9 BENIGN HYPERTENSION WITH CKD (CHRONIC KIDNEY DISEASE) STAGE III (HCC): ICD-10-CM

## 2019-04-05 PROCEDURE — 99213 OFFICE O/P EST LOW 20 MIN: CPT | Performed by: FAMILY MEDICINE

## 2019-04-05 RX ORDER — METOPROLOL TARTRATE 50 MG/1
50 TABLET, FILM COATED ORAL EVERY 12 HOURS SCHEDULED
Qty: 180 TABLET | Refills: 3 | Status: SHIPPED | OUTPATIENT
Start: 2019-04-05 | End: 2019-04-23 | Stop reason: ALTCHOICE

## 2019-04-05 RX ORDER — LOSARTAN POTASSIUM AND HYDROCHLOROTHIAZIDE 25; 100 MG/1; MG/1
1 TABLET ORAL DAILY
Qty: 90 TABLET | Refills: 2 | Status: SHIPPED | OUTPATIENT
Start: 2019-04-05 | End: 2020-01-07 | Stop reason: HOSPADM

## 2019-04-05 RX ORDER — HYDRALAZINE HYDROCHLORIDE 25 MG/1
25 TABLET, FILM COATED ORAL 3 TIMES DAILY
Qty: 90 TABLET | Refills: 5 | Status: SHIPPED | OUTPATIENT
Start: 2019-04-05 | End: 2019-04-25 | Stop reason: HOSPADM

## 2019-04-16 ENCOUNTER — DOCUMENTATION (OUTPATIENT)
Dept: NEPHROLOGY | Facility: CLINIC | Age: 79
End: 2019-04-16

## 2019-04-23 ENCOUNTER — HOSPITAL ENCOUNTER (OUTPATIENT)
Facility: HOSPITAL | Age: 79
Setting detail: OBSERVATION
Discharge: HOME | End: 2019-04-25
Attending: EMERGENCY MEDICINE | Admitting: FAMILY MEDICINE
Payer: COMMERCIAL

## 2019-04-23 ENCOUNTER — APPOINTMENT (EMERGENCY)
Dept: RADIOLOGY | Facility: HOSPITAL | Age: 79
End: 2019-04-23
Payer: COMMERCIAL

## 2019-04-23 ENCOUNTER — OFFICE VISIT (OUTPATIENT)
Dept: FAMILY MEDICINE CLINIC | Facility: CLINIC | Age: 79
End: 2019-04-23

## 2019-04-23 VITALS
TEMPERATURE: 97 F | HEIGHT: 68 IN | DIASTOLIC BLOOD PRESSURE: 92 MMHG | RESPIRATION RATE: 16 BRPM | SYSTOLIC BLOOD PRESSURE: 138 MMHG | BODY MASS INDEX: 29.31 KG/M2 | WEIGHT: 193.4 LBS | HEART RATE: 46 BPM

## 2019-04-23 DIAGNOSIS — I10 BENIGN ESSENTIAL HYPERTENSION: ICD-10-CM

## 2019-04-23 DIAGNOSIS — R00.1 BRADYCARDIA: ICD-10-CM

## 2019-04-23 DIAGNOSIS — I50.9 CHF (CONGESTIVE HEART FAILURE) (HCC): Primary | ICD-10-CM

## 2019-04-23 DIAGNOSIS — R06.02 SOBOE (SHORTNESS OF BREATH ON EXERTION): ICD-10-CM

## 2019-04-23 DIAGNOSIS — R00.1 BRADYCARDIA: Primary | ICD-10-CM

## 2019-04-23 DIAGNOSIS — R06.00 DYSPNEA, UNSPECIFIED TYPE: ICD-10-CM

## 2019-04-23 LAB
ALBUMIN SERPL BCP-MCNC: 3.1 G/DL (ref 3.5–5)
ALP SERPL-CCNC: 69 U/L (ref 46–116)
ALT SERPL W P-5'-P-CCNC: 27 U/L (ref 12–78)
ANION GAP SERPL CALCULATED.3IONS-SCNC: 3 MMOL/L (ref 4–13)
AST SERPL W P-5'-P-CCNC: 27 U/L (ref 5–45)
ATRIAL RATE: 55 BPM
BASOPHILS # BLD AUTO: 0.01 THOUSANDS/ΜL (ref 0–0.1)
BASOPHILS NFR BLD AUTO: 0 % (ref 0–1)
BILIRUB SERPL-MCNC: 0.51 MG/DL (ref 0.2–1)
BUN SERPL-MCNC: 28 MG/DL (ref 5–25)
CALCIUM SERPL-MCNC: 8.1 MG/DL (ref 8.3–10.1)
CHLORIDE SERPL-SCNC: 114 MMOL/L (ref 100–108)
CO2 SERPL-SCNC: 26 MMOL/L (ref 21–32)
CREAT SERPL-MCNC: 1.86 MG/DL (ref 0.6–1.3)
EOSINOPHIL # BLD AUTO: 0.28 THOUSAND/ΜL (ref 0–0.61)
EOSINOPHIL NFR BLD AUTO: 4 % (ref 0–6)
ERYTHROCYTE [DISTWIDTH] IN BLOOD BY AUTOMATED COUNT: 14 % (ref 11.6–15.1)
GFR SERPL CREATININE-BSD FRML MDRD: 34 ML/MIN/1.73SQ M
GLUCOSE SERPL-MCNC: 82 MG/DL (ref 65–140)
HCT VFR BLD AUTO: 41.4 % (ref 36.5–49.3)
HGB BLD-MCNC: 13.6 G/DL (ref 12–17)
IMM GRANULOCYTES # BLD AUTO: 0.05 THOUSAND/UL (ref 0–0.2)
IMM GRANULOCYTES NFR BLD AUTO: 1 % (ref 0–2)
LYMPHOCYTES # BLD AUTO: 1.47 THOUSANDS/ΜL (ref 0.6–4.47)
LYMPHOCYTES NFR BLD AUTO: 21 % (ref 14–44)
MAGNESIUM SERPL-MCNC: 1.9 MG/DL (ref 1.6–2.6)
MCH RBC QN AUTO: 30.6 PG (ref 26.8–34.3)
MCHC RBC AUTO-ENTMCNC: 32.9 G/DL (ref 31.4–37.4)
MCV RBC AUTO: 93 FL (ref 82–98)
MONOCYTES # BLD AUTO: 0.99 THOUSAND/ΜL (ref 0.17–1.22)
MONOCYTES NFR BLD AUTO: 14 % (ref 4–12)
NEUTROPHILS # BLD AUTO: 4.36 THOUSANDS/ΜL (ref 1.85–7.62)
NEUTS SEG NFR BLD AUTO: 60 % (ref 43–75)
NRBC BLD AUTO-RTO: 0 /100 WBCS
NT-PROBNP SERPL-MCNC: 4974 PG/ML
P AXIS: 75 DEGREES
PLATELET # BLD AUTO: 165 THOUSANDS/UL (ref 149–390)
PMV BLD AUTO: 10.3 FL (ref 8.9–12.7)
POTASSIUM SERPL-SCNC: 4.1 MMOL/L (ref 3.5–5.3)
PR INTERVAL: 202 MS
PROT SERPL-MCNC: 7.1 G/DL (ref 6.4–8.2)
QRS AXIS: 64 DEGREES
QRSD INTERVAL: 72 MS
QT INTERVAL: 446 MS
QTC INTERVAL: 426 MS
RBC # BLD AUTO: 4.45 MILLION/UL (ref 3.88–5.62)
SODIUM SERPL-SCNC: 143 MMOL/L (ref 136–145)
T WAVE AXIS: 67 DEGREES
TROPONIN I SERPL-MCNC: <0.02 NG/ML
VENTRICULAR RATE: 55 BPM
WBC # BLD AUTO: 7.16 THOUSAND/UL (ref 4.31–10.16)

## 2019-04-23 PROCEDURE — 93005 ELECTROCARDIOGRAM TRACING: CPT

## 2019-04-23 PROCEDURE — 93000 ELECTROCARDIOGRAM COMPLETE: CPT | Performed by: FAMILY MEDICINE

## 2019-04-23 PROCEDURE — 1111F DSCHRG MED/CURRENT MED MERGE: CPT | Performed by: FAMILY MEDICINE

## 2019-04-23 PROCEDURE — 83880 ASSAY OF NATRIURETIC PEPTIDE: CPT | Performed by: STUDENT IN AN ORGANIZED HEALTH CARE EDUCATION/TRAINING PROGRAM

## 2019-04-23 PROCEDURE — 84484 ASSAY OF TROPONIN QUANT: CPT | Performed by: EMERGENCY MEDICINE

## 2019-04-23 PROCEDURE — 80053 COMPREHEN METABOLIC PANEL: CPT | Performed by: EMERGENCY MEDICINE

## 2019-04-23 PROCEDURE — 36415 COLL VENOUS BLD VENIPUNCTURE: CPT

## 2019-04-23 PROCEDURE — 4040F PNEUMOC VAC/ADMIN/RCVD: CPT | Performed by: FAMILY MEDICINE

## 2019-04-23 PROCEDURE — 93010 ELECTROCARDIOGRAM REPORT: CPT | Performed by: INTERNAL MEDICINE

## 2019-04-23 PROCEDURE — 1036F TOBACCO NON-USER: CPT | Performed by: FAMILY MEDICINE

## 2019-04-23 PROCEDURE — 99285 EMERGENCY DEPT VISIT HI MDM: CPT | Performed by: EMERGENCY MEDICINE

## 2019-04-23 PROCEDURE — 99285 EMERGENCY DEPT VISIT HI MDM: CPT

## 2019-04-23 PROCEDURE — 99213 OFFICE O/P EST LOW 20 MIN: CPT | Performed by: FAMILY MEDICINE

## 2019-04-23 PROCEDURE — 1160F RVW MEDS BY RX/DR IN RCRD: CPT | Performed by: FAMILY MEDICINE

## 2019-04-23 PROCEDURE — 85025 COMPLETE CBC W/AUTO DIFF WBC: CPT | Performed by: EMERGENCY MEDICINE

## 2019-04-23 PROCEDURE — 71046 X-RAY EXAM CHEST 2 VIEWS: CPT

## 2019-04-23 PROCEDURE — 83735 ASSAY OF MAGNESIUM: CPT | Performed by: STUDENT IN AN ORGANIZED HEALTH CARE EDUCATION/TRAINING PROGRAM

## 2019-04-23 RX ORDER — HYDRALAZINE HYDROCHLORIDE 20 MG/ML
20 INJECTION INTRAMUSCULAR; INTRAVENOUS ONCE
Status: COMPLETED | OUTPATIENT
Start: 2019-04-23 | End: 2019-04-23

## 2019-04-23 RX ORDER — FUROSEMIDE 10 MG/ML
20 INJECTION INTRAMUSCULAR; INTRAVENOUS ONCE
Status: COMPLETED | OUTPATIENT
Start: 2019-04-23 | End: 2019-04-23

## 2019-04-23 RX ORDER — ACETAMINOPHEN 325 MG/1
650 TABLET ORAL EVERY 6 HOURS PRN
Status: DISCONTINUED | OUTPATIENT
Start: 2019-04-23 | End: 2019-04-25 | Stop reason: HOSPADM

## 2019-04-23 RX ORDER — HYDRALAZINE HYDROCHLORIDE 25 MG/1
25 TABLET, FILM COATED ORAL 3 TIMES DAILY
Status: DISCONTINUED | OUTPATIENT
Start: 2019-04-23 | End: 2019-04-24

## 2019-04-23 RX ADMIN — HYDRALAZINE HYDROCHLORIDE 20 MG: 20 INJECTION INTRAMUSCULAR; INTRAVENOUS at 23:21

## 2019-04-23 RX ADMIN — FUROSEMIDE 20 MG: 10 INJECTION, SOLUTION INTRAMUSCULAR; INTRAVENOUS at 23:20

## 2019-04-24 ENCOUNTER — APPOINTMENT (OUTPATIENT)
Dept: NON INVASIVE DIAGNOSTICS | Facility: HOSPITAL | Age: 79
End: 2019-04-24
Payer: COMMERCIAL

## 2019-04-24 LAB
ANION GAP SERPL CALCULATED.3IONS-SCNC: 4 MMOL/L (ref 4–13)
ATRIAL RATE: 62 BPM
ATRIAL RATE: 63 BPM
BUN SERPL-MCNC: 27 MG/DL (ref 5–25)
CALCIUM SERPL-MCNC: 8.2 MG/DL (ref 8.3–10.1)
CHLORIDE SERPL-SCNC: 113 MMOL/L (ref 100–108)
CO2 SERPL-SCNC: 27 MMOL/L (ref 21–32)
CREAT SERPL-MCNC: 1.72 MG/DL (ref 0.6–1.3)
GFR SERPL CREATININE-BSD FRML MDRD: 38 ML/MIN/1.73SQ M
GLUCOSE P FAST SERPL-MCNC: 85 MG/DL (ref 65–99)
GLUCOSE SERPL-MCNC: 85 MG/DL (ref 65–140)
MAGNESIUM SERPL-MCNC: 2 MG/DL (ref 1.6–2.6)
P AXIS: 75 DEGREES
P AXIS: 81 DEGREES
PHOSPHATE SERPL-MCNC: 2.7 MG/DL (ref 2.3–4.1)
PLATELET # BLD AUTO: 167 THOUSANDS/UL (ref 149–390)
PMV BLD AUTO: 10.7 FL (ref 8.9–12.7)
POTASSIUM SERPL-SCNC: 3.9 MMOL/L (ref 3.5–5.3)
PR INTERVAL: 210 MS
PR INTERVAL: 210 MS
QRS AXIS: 59 DEGREES
QRS AXIS: 59 DEGREES
QRSD INTERVAL: 70 MS
QRSD INTERVAL: 70 MS
QT INTERVAL: 458 MS
QT INTERVAL: 466 MS
QTC INTERVAL: 468 MS
QTC INTERVAL: 472 MS
SODIUM SERPL-SCNC: 144 MMOL/L (ref 136–145)
T WAVE AXIS: 78 DEGREES
T WAVE AXIS: 99 DEGREES
VENTRICULAR RATE: 62 BPM
VENTRICULAR RATE: 63 BPM

## 2019-04-24 PROCEDURE — 99203 OFFICE O/P NEW LOW 30 MIN: CPT | Performed by: INTERNAL MEDICINE

## 2019-04-24 PROCEDURE — 99219 PR INITIAL OBSERVATION CARE/DAY 50 MINUTES: CPT | Performed by: FAMILY MEDICINE

## 2019-04-24 PROCEDURE — 93005 ELECTROCARDIOGRAM TRACING: CPT

## 2019-04-24 PROCEDURE — 93010 ELECTROCARDIOGRAM REPORT: CPT | Performed by: INTERNAL MEDICINE

## 2019-04-24 PROCEDURE — 93306 TTE W/DOPPLER COMPLETE: CPT

## 2019-04-24 PROCEDURE — 80048 BASIC METABOLIC PNL TOTAL CA: CPT | Performed by: FAMILY MEDICINE

## 2019-04-24 PROCEDURE — 83735 ASSAY OF MAGNESIUM: CPT | Performed by: FAMILY MEDICINE

## 2019-04-24 PROCEDURE — 84100 ASSAY OF PHOSPHORUS: CPT | Performed by: FAMILY MEDICINE

## 2019-04-24 PROCEDURE — 85049 AUTOMATED PLATELET COUNT: CPT | Performed by: FAMILY MEDICINE

## 2019-04-24 PROCEDURE — 93306 TTE W/DOPPLER COMPLETE: CPT | Performed by: INTERNAL MEDICINE

## 2019-04-24 RX ORDER — FUROSEMIDE 10 MG/ML
40 INJECTION INTRAMUSCULAR; INTRAVENOUS
Status: DISCONTINUED | OUTPATIENT
Start: 2019-04-24 | End: 2019-04-24

## 2019-04-24 RX ORDER — HYDRALAZINE HYDROCHLORIDE 50 MG/1
50 TABLET, FILM COATED ORAL EVERY 8 HOURS SCHEDULED
Status: DISCONTINUED | OUTPATIENT
Start: 2019-04-24 | End: 2019-04-25 | Stop reason: HOSPADM

## 2019-04-24 RX ORDER — FUROSEMIDE 10 MG/ML
40 INJECTION INTRAMUSCULAR; INTRAVENOUS ONCE
Status: COMPLETED | OUTPATIENT
Start: 2019-04-24 | End: 2019-04-24

## 2019-04-24 RX ADMIN — HYDRALAZINE HYDROCHLORIDE 50 MG: 50 TABLET ORAL at 21:45

## 2019-04-24 RX ADMIN — LOSARTAN POTASSIUM: 50 TABLET, FILM COATED ORAL at 08:50

## 2019-04-24 RX ADMIN — FUROSEMIDE 40 MG: 10 INJECTION, SOLUTION INTRAMUSCULAR; INTRAVENOUS at 11:55

## 2019-04-24 RX ADMIN — HYDRALAZINE HYDROCHLORIDE 25 MG: 25 TABLET, FILM COATED ORAL at 08:19

## 2019-04-24 RX ADMIN — ENOXAPARIN SODIUM 40 MG: 40 INJECTION SUBCUTANEOUS at 08:19

## 2019-04-24 RX ADMIN — HYDRALAZINE HYDROCHLORIDE 50 MG: 50 TABLET ORAL at 11:53

## 2019-04-25 VITALS
TEMPERATURE: 97.9 F | OXYGEN SATURATION: 95 % | BODY MASS INDEX: 28.23 KG/M2 | HEIGHT: 68 IN | RESPIRATION RATE: 18 BRPM | HEART RATE: 81 BPM | WEIGHT: 186.29 LBS | SYSTOLIC BLOOD PRESSURE: 137 MMHG | DIASTOLIC BLOOD PRESSURE: 73 MMHG

## 2019-04-25 PROCEDURE — 99217 PR OBSERVATION CARE DISCHARGE MANAGEMENT: CPT | Performed by: FAMILY MEDICINE

## 2019-04-25 PROCEDURE — 99213 OFFICE O/P EST LOW 20 MIN: CPT | Performed by: INTERNAL MEDICINE

## 2019-04-25 RX ORDER — HYDRALAZINE HYDROCHLORIDE 50 MG/1
50 TABLET, FILM COATED ORAL EVERY 8 HOURS SCHEDULED
Qty: 90 TABLET | Refills: 0 | Status: SHIPPED | OUTPATIENT
Start: 2019-04-25 | End: 2020-01-07 | Stop reason: HOSPADM

## 2019-04-25 RX ADMIN — HYDRALAZINE HYDROCHLORIDE 50 MG: 50 TABLET ORAL at 06:38

## 2019-04-25 RX ADMIN — LOSARTAN POTASSIUM: 50 TABLET, FILM COATED ORAL at 08:35

## 2019-04-25 RX ADMIN — ENOXAPARIN SODIUM 40 MG: 40 INJECTION SUBCUTANEOUS at 08:35

## 2019-04-26 ENCOUNTER — TRANSITIONAL CARE MANAGEMENT (OUTPATIENT)
Dept: FAMILY MEDICINE CLINIC | Facility: CLINIC | Age: 79
End: 2019-04-26

## 2019-07-01 ENCOUNTER — TELEPHONE (OUTPATIENT)
Dept: NEPHROLOGY | Facility: CLINIC | Age: 79
End: 2019-07-01

## 2019-07-01 NOTE — TELEPHONE ENCOUNTER
Patient has been contacting multiple times regarding rescheduling his missed BP appointments  Will be mailing out a letter to patient

## 2019-12-14 ENCOUNTER — APPOINTMENT (EMERGENCY)
Dept: RADIOLOGY | Facility: HOSPITAL | Age: 79
DRG: 219 | End: 2019-12-14
Payer: COMMERCIAL

## 2019-12-14 ENCOUNTER — HOSPITAL ENCOUNTER (INPATIENT)
Facility: HOSPITAL | Age: 79
LOS: 24 days | Discharge: HOME HEALTH | DRG: 219 | End: 2020-01-07
Attending: EMERGENCY MEDICINE | Admitting: EMERGENCY MEDICINE
Payer: COMMERCIAL

## 2019-12-14 DIAGNOSIS — I71.01 DISSECTION OF THORACIC AORTA (HCC): Primary | ICD-10-CM

## 2019-12-14 DIAGNOSIS — N17.9 AKI (ACUTE KIDNEY INJURY) (HCC): ICD-10-CM

## 2019-12-14 DIAGNOSIS — N17.9 ACUTE KIDNEY INJURY SUPERIMPOSED ON CHRONIC KIDNEY DISEASE (HCC): ICD-10-CM

## 2019-12-14 DIAGNOSIS — Z98.890 S/P AORTA REPAIR: ICD-10-CM

## 2019-12-14 DIAGNOSIS — N18.9 ACUTE KIDNEY INJURY SUPERIMPOSED ON CHRONIC KIDNEY DISEASE (HCC): ICD-10-CM

## 2019-12-14 DIAGNOSIS — I71.00 INTRAMURAL AORTIC HEMATOMA (HCC): ICD-10-CM

## 2019-12-14 DIAGNOSIS — R09.3 ABNORMAL SPUTUM: ICD-10-CM

## 2019-12-14 LAB
ALBUMIN SERPL BCP-MCNC: 3.1 G/DL (ref 3.5–5)
ALP SERPL-CCNC: 54 U/L (ref 46–116)
ALT SERPL W P-5'-P-CCNC: 29 U/L (ref 12–78)
ANION GAP SERPL CALCULATED.3IONS-SCNC: 5 MMOL/L (ref 4–13)
AST SERPL W P-5'-P-CCNC: 39 U/L (ref 5–45)
BASOPHILS # BLD AUTO: 0.02 THOUSANDS/ΜL (ref 0–0.1)
BASOPHILS NFR BLD AUTO: 0 % (ref 0–1)
BILIRUB SERPL-MCNC: 0.53 MG/DL (ref 0.2–1)
BUN SERPL-MCNC: 31 MG/DL (ref 5–25)
CALCIUM SERPL-MCNC: 9.3 MG/DL (ref 8.3–10.1)
CHLORIDE SERPL-SCNC: 110 MMOL/L (ref 100–108)
CO2 SERPL-SCNC: 26 MMOL/L (ref 21–32)
CREAT SERPL-MCNC: 1.86 MG/DL (ref 0.6–1.3)
EOSINOPHIL # BLD AUTO: 0.12 THOUSAND/ΜL (ref 0–0.61)
EOSINOPHIL NFR BLD AUTO: 1 % (ref 0–6)
ERYTHROCYTE [DISTWIDTH] IN BLOOD BY AUTOMATED COUNT: 13.3 % (ref 11.6–15.1)
GFR SERPL CREATININE-BSD FRML MDRD: 34 ML/MIN/1.73SQ M
GLUCOSE SERPL-MCNC: 162 MG/DL (ref 65–140)
HCT VFR BLD AUTO: 44.3 % (ref 36.5–49.3)
HGB BLD-MCNC: 14.8 G/DL (ref 12–17)
IMM GRANULOCYTES # BLD AUTO: 0.03 THOUSAND/UL (ref 0–0.2)
IMM GRANULOCYTES NFR BLD AUTO: 0 % (ref 0–2)
LYMPHOCYTES # BLD AUTO: 1.73 THOUSANDS/ΜL (ref 0.6–4.47)
LYMPHOCYTES NFR BLD AUTO: 19 % (ref 14–44)
MCH RBC QN AUTO: 30.6 PG (ref 26.8–34.3)
MCHC RBC AUTO-ENTMCNC: 33.4 G/DL (ref 31.4–37.4)
MCV RBC AUTO: 92 FL (ref 82–98)
MONOCYTES # BLD AUTO: 0.89 THOUSAND/ΜL (ref 0.17–1.22)
MONOCYTES NFR BLD AUTO: 10 % (ref 4–12)
NEUTROPHILS # BLD AUTO: 6.28 THOUSANDS/ΜL (ref 1.85–7.62)
NEUTS SEG NFR BLD AUTO: 70 % (ref 43–75)
NRBC BLD AUTO-RTO: 0 /100 WBCS
PLATELET # BLD AUTO: 169 THOUSANDS/UL (ref 149–390)
PMV BLD AUTO: 10.4 FL (ref 8.9–12.7)
POTASSIUM SERPL-SCNC: 4.5 MMOL/L (ref 3.5–5.3)
PROT SERPL-MCNC: 7.5 G/DL (ref 6.4–8.2)
RBC # BLD AUTO: 4.83 MILLION/UL (ref 3.88–5.62)
SODIUM SERPL-SCNC: 141 MMOL/L (ref 136–145)
TROPONIN I SERPL-MCNC: 0.03 NG/ML
TROPONIN I SERPL-MCNC: <0.02 NG/ML
WBC # BLD AUTO: 9.07 THOUSAND/UL (ref 4.31–10.16)

## 2019-12-14 PROCEDURE — 84484 ASSAY OF TROPONIN QUANT: CPT | Performed by: EMERGENCY MEDICINE

## 2019-12-14 PROCEDURE — 96375 TX/PRO/DX INJ NEW DRUG ADDON: CPT

## 2019-12-14 PROCEDURE — 74174 CTA ABD&PLVS W/CONTRAST: CPT

## 2019-12-14 PROCEDURE — 36415 COLL VENOUS BLD VENIPUNCTURE: CPT | Performed by: EMERGENCY MEDICINE

## 2019-12-14 PROCEDURE — 71275 CT ANGIOGRAPHY CHEST: CPT

## 2019-12-14 PROCEDURE — 96366 THER/PROPH/DIAG IV INF ADDON: CPT

## 2019-12-14 PROCEDURE — 93005 ELECTROCARDIOGRAM TRACING: CPT

## 2019-12-14 PROCEDURE — 71046 X-RAY EXAM CHEST 2 VIEWS: CPT

## 2019-12-14 PROCEDURE — 80053 COMPREHEN METABOLIC PANEL: CPT | Performed by: EMERGENCY MEDICINE

## 2019-12-14 PROCEDURE — 36415 COLL VENOUS BLD VENIPUNCTURE: CPT

## 2019-12-14 PROCEDURE — 99285 EMERGENCY DEPT VISIT HI MDM: CPT | Performed by: EMERGENCY MEDICINE

## 2019-12-14 PROCEDURE — 85025 COMPLETE CBC W/AUTO DIFF WBC: CPT | Performed by: EMERGENCY MEDICINE

## 2019-12-14 PROCEDURE — 96365 THER/PROPH/DIAG IV INF INIT: CPT

## 2019-12-14 PROCEDURE — 99285 EMERGENCY DEPT VISIT HI MDM: CPT

## 2019-12-14 RX ORDER — FENTANYL CITRATE 50 UG/ML
50 INJECTION, SOLUTION INTRAMUSCULAR; INTRAVENOUS ONCE
Status: COMPLETED | OUTPATIENT
Start: 2019-12-14 | End: 2019-12-14

## 2019-12-14 RX ORDER — NITROGLYCERIN 20 MG/100ML
5-200 INJECTION INTRAVENOUS
Status: DISCONTINUED | OUTPATIENT
Start: 2019-12-14 | End: 2019-12-15

## 2019-12-14 RX ORDER — FENTANYL CITRATE 50 UG/ML
25 INJECTION, SOLUTION INTRAMUSCULAR; INTRAVENOUS ONCE
Status: DISCONTINUED | OUTPATIENT
Start: 2019-12-14 | End: 2019-12-14

## 2019-12-14 RX ORDER — ESMOLOL HYDROCHLORIDE 10 MG/ML
25-200 INJECTION, SOLUTION INTRAVENOUS ONCE
Status: COMPLETED | OUTPATIENT
Start: 2019-12-14 | End: 2019-12-15

## 2019-12-14 RX ORDER — LABETALOL 20 MG/4 ML (5 MG/ML) INTRAVENOUS SYRINGE
10 ONCE
Status: COMPLETED | OUTPATIENT
Start: 2019-12-14 | End: 2019-12-14

## 2019-12-14 RX ORDER — FENTANYL CITRATE 50 UG/ML
50 INJECTION, SOLUTION INTRAMUSCULAR; INTRAVENOUS ONCE
Status: DISCONTINUED | OUTPATIENT
Start: 2019-12-14 | End: 2019-12-14

## 2019-12-14 RX ADMIN — NITROGLYCERIN 20 MCG/MIN: 20 INJECTION INTRAVENOUS at 21:15

## 2019-12-14 RX ADMIN — SODIUM CHLORIDE 1000 ML: 0.9 INJECTION, SOLUTION INTRAVENOUS at 20:59

## 2019-12-14 RX ADMIN — FENTANYL CITRATE 50 MCG: 50 INJECTION, SOLUTION INTRAMUSCULAR; INTRAVENOUS at 21:14

## 2019-12-14 RX ADMIN — LABETALOL 20 MG/4 ML (5 MG/ML) INTRAVENOUS SYRINGE 10 MG: at 23:15

## 2019-12-14 RX ADMIN — IODIXANOL 100 ML: 320 INJECTION, SOLUTION INTRAVASCULAR at 21:54

## 2019-12-14 RX ADMIN — ESMOLOL HYDROCHLORIDE 50 MCG/KG/MIN: 10 INJECTION INTRAVENOUS at 23:15

## 2019-12-14 RX ADMIN — FENTANYL CITRATE 50 MCG: 50 INJECTION, SOLUTION INTRAMUSCULAR; INTRAVENOUS at 22:11

## 2019-12-15 ENCOUNTER — APPOINTMENT (INPATIENT)
Dept: NON INVASIVE DIAGNOSTICS | Facility: HOSPITAL | Age: 79
DRG: 219 | End: 2019-12-15
Payer: COMMERCIAL

## 2019-12-15 ENCOUNTER — APPOINTMENT (INPATIENT)
Dept: RADIOLOGY | Facility: HOSPITAL | Age: 79
DRG: 219 | End: 2019-12-15
Payer: COMMERCIAL

## 2019-12-15 ENCOUNTER — ANESTHESIA (INPATIENT)
Dept: PERIOP | Facility: HOSPITAL | Age: 79
DRG: 219 | End: 2019-12-15
Payer: COMMERCIAL

## 2019-12-15 ENCOUNTER — ANESTHESIA EVENT (INPATIENT)
Dept: PERIOP | Facility: HOSPITAL | Age: 79
DRG: 219 | End: 2019-12-15
Payer: COMMERCIAL

## 2019-12-15 PROBLEM — N18.30 BENIGN HYPERTENSION WITH CKD (CHRONIC KIDNEY DISEASE) STAGE III (HCC): Chronic | Status: ACTIVE | Noted: 2019-01-09

## 2019-12-15 PROBLEM — I71.019 DISSECTION OF THORACIC AORTA: Status: ACTIVE | Noted: 2019-12-15

## 2019-12-15 PROBLEM — Z98.890 S/P AORTA REPAIR: Status: ACTIVE | Noted: 2019-12-15

## 2019-12-15 PROBLEM — I12.9 BENIGN HYPERTENSION WITH CKD (CHRONIC KIDNEY DISEASE) STAGE III (HCC): Chronic | Status: ACTIVE | Noted: 2019-01-09

## 2019-12-15 PROBLEM — I71.01 DISSECTION OF THORACIC AORTA (HCC): Status: ACTIVE | Noted: 2019-12-15

## 2019-12-15 LAB
ABO GROUP BLD BPU: NORMAL
ABO GROUP BLD BPU: NORMAL
ABO GROUP BLD: NORMAL
ANION GAP SERPL CALCULATED.3IONS-SCNC: 10 MMOL/L (ref 4–13)
ANION GAP SERPL CALCULATED.3IONS-SCNC: 4 MMOL/L (ref 4–13)
APTT PPP: 41 SECONDS (ref 23–37)
ATRIAL RATE: 65 BPM
ATRIAL RATE: 79 BPM
ATRIAL RATE: 85 BPM
ATRIAL RATE: 86 BPM
BASE EXCESS BLDA CALC-SCNC: -2 MMOL/L (ref -2–3)
BASE EXCESS BLDA CALC-SCNC: -2 MMOL/L (ref -2–3)
BASE EXCESS BLDA CALC-SCNC: -3 MMOL/L (ref -2–3)
BASE EXCESS BLDA CALC-SCNC: -3 MMOL/L (ref -2–3)
BASE EXCESS BLDA CALC-SCNC: -4 MMOL/L (ref -2–3)
BASE EXCESS BLDA CALC-SCNC: -8.3 MMOL/L
BASE EXCESS BLDA CALC-SCNC: 0 MMOL/L (ref -2–3)
BASE EXCESS BLDA CALC-SCNC: 0 MMOL/L (ref -2–3)
BASE EXCESS BLDA CALC-SCNC: 1 MMOL/L (ref -2–3)
BASE EXCESS BLDA CALC-SCNC: 1 MMOL/L (ref -2–3)
BASE EXCESS BLDA CALC-SCNC: 3 MMOL/L (ref -2–3)
BLD GP AB SCN SERPL QL: NEGATIVE
BPU ID: NORMAL
BPU ID: NORMAL
BUN SERPL-MCNC: 32 MG/DL (ref 5–25)
BUN SERPL-MCNC: 33 MG/DL (ref 5–25)
CA-I BLD-SCNC: 0.94 MMOL/L (ref 1.12–1.32)
CA-I BLD-SCNC: 1.01 MMOL/L (ref 1.12–1.32)
CA-I BLD-SCNC: 1.04 MMOL/L (ref 1.12–1.32)
CA-I BLD-SCNC: 1.05 MMOL/L (ref 1.12–1.32)
CA-I BLD-SCNC: 1.05 MMOL/L (ref 1.12–1.32)
CA-I BLD-SCNC: 1.08 MMOL/L (ref 1.12–1.32)
CA-I BLD-SCNC: 1.14 MMOL/L (ref 1.12–1.32)
CA-I BLD-SCNC: 1.22 MMOL/L (ref 1.12–1.32)
CA-I BLD-SCNC: 1.23 MMOL/L (ref 1.12–1.32)
CA-I BLD-SCNC: 1.23 MMOL/L (ref 1.12–1.32)
CALCIUM SERPL-MCNC: 8.3 MG/DL (ref 8.3–10.1)
CALCIUM SERPL-MCNC: 8.4 MG/DL (ref 8.3–10.1)
CHLORIDE SERPL-SCNC: 115 MMOL/L (ref 100–108)
CHLORIDE SERPL-SCNC: 116 MMOL/L (ref 100–108)
CO2 SERPL-SCNC: 24 MMOL/L (ref 21–32)
CO2 SERPL-SCNC: 24 MMOL/L (ref 21–32)
CREAT SERPL-MCNC: 1.7 MG/DL (ref 0.6–1.3)
CREAT SERPL-MCNC: 2.1 MG/DL (ref 0.6–1.3)
ERYTHROCYTE [DISTWIDTH] IN BLOOD BY AUTOMATED COUNT: 13.2 % (ref 11.6–15.1)
FIBRINOGEN PPP-MCNC: 205 MG/DL (ref 227–495)
FIO2 GAS DIL.REBREATH: 70 L
GFR SERPL CREATININE-BSD FRML MDRD: 29 ML/MIN/1.73SQ M
GFR SERPL CREATININE-BSD FRML MDRD: 38 ML/MIN/1.73SQ M
GLUCOSE SERPL-MCNC: 101 MG/DL (ref 65–140)
GLUCOSE SERPL-MCNC: 104 MG/DL (ref 65–140)
GLUCOSE SERPL-MCNC: 106 MG/DL (ref 65–140)
GLUCOSE SERPL-MCNC: 108 MG/DL (ref 65–140)
GLUCOSE SERPL-MCNC: 109 MG/DL (ref 65–140)
GLUCOSE SERPL-MCNC: 114 MG/DL (ref 65–140)
GLUCOSE SERPL-MCNC: 120 MG/DL (ref 65–140)
GLUCOSE SERPL-MCNC: 131 MG/DL (ref 65–140)
GLUCOSE SERPL-MCNC: 148 MG/DL (ref 65–140)
GLUCOSE SERPL-MCNC: 155 MG/DL (ref 65–140)
GLUCOSE SERPL-MCNC: 164 MG/DL (ref 65–140)
GLUCOSE SERPL-MCNC: 166 MG/DL (ref 65–140)
GLUCOSE SERPL-MCNC: 194 MG/DL (ref 65–140)
GLUCOSE SERPL-MCNC: 90 MG/DL (ref 65–140)
GLUCOSE SERPL-MCNC: 95 MG/DL (ref 65–140)
GLUCOSE SERPL-MCNC: 99 MG/DL (ref 65–140)
HCO3 BLDA-SCNC: 16.7 MMOL/L (ref 22–28)
HCO3 BLDA-SCNC: 22.8 MMOL/L (ref 22–28)
HCO3 BLDA-SCNC: 23 MMOL/L (ref 22–28)
HCO3 BLDA-SCNC: 23.4 MMOL/L (ref 22–28)
HCO3 BLDA-SCNC: 23.5 MMOL/L (ref 24–30)
HCO3 BLDA-SCNC: 24.6 MMOL/L (ref 22–28)
HCO3 BLDA-SCNC: 25.1 MMOL/L (ref 22–28)
HCO3 BLDA-SCNC: 27.2 MMOL/L (ref 24–30)
HCO3 BLDA-SCNC: 27.9 MMOL/L (ref 22–28)
HCT VFR BLD AUTO: 38 % (ref 36.5–49.3)
HCT VFR BLD AUTO: 38.5 % (ref 36.5–49.3)
HCT VFR BLD CALC: 26 % (ref 36.5–49.3)
HCT VFR BLD CALC: 27 % (ref 36.5–49.3)
HCT VFR BLD CALC: 29 % (ref 36.5–49.3)
HCT VFR BLD CALC: 29 % (ref 36.5–49.3)
HCT VFR BLD CALC: 30 % (ref 36.5–49.3)
HCT VFR BLD CALC: 30 % (ref 36.5–49.3)
HCT VFR BLD CALC: 31 % (ref 36.5–49.3)
HCT VFR BLD CALC: 33 % (ref 36.5–49.3)
HCT VFR BLD CALC: 35 % (ref 36.5–49.3)
HCT VFR BLD CALC: 36 % (ref 36.5–49.3)
HGB BLD-MCNC: 12.6 G/DL (ref 12–17)
HGB BLD-MCNC: 12.7 G/DL (ref 12–17)
HGB BLDA-MCNC: 10.2 G/DL (ref 12–17)
HGB BLDA-MCNC: 10.2 G/DL (ref 12–17)
HGB BLDA-MCNC: 10.5 G/DL (ref 12–17)
HGB BLDA-MCNC: 11.2 G/DL (ref 12–17)
HGB BLDA-MCNC: 11.9 G/DL (ref 12–17)
HGB BLDA-MCNC: 12.2 G/DL (ref 12–17)
HGB BLDA-MCNC: 8.8 G/DL (ref 12–17)
HGB BLDA-MCNC: 9.2 G/DL (ref 12–17)
HGB BLDA-MCNC: 9.9 G/DL (ref 12–17)
HGB BLDA-MCNC: 9.9 G/DL (ref 12–17)
INR PPP: 1.27 (ref 0.84–1.19)
INR PPP: 1.75 (ref 0.84–1.19)
KCT BLD-ACNC: 126 SEC (ref 89–137)
KCT BLD-ACNC: 132 SEC (ref 89–137)
KCT BLD-ACNC: 461 SEC (ref 89–137)
KCT BLD-ACNC: 467 SEC (ref 89–137)
KCT BLD-ACNC: 473 SEC (ref 89–137)
KCT BLD-ACNC: 497 SEC (ref 89–137)
KCT BLD-ACNC: 543 SEC (ref 89–137)
KCT BLD-ACNC: 548 SEC (ref 89–137)
KCT BLD-ACNC: 554 SEC (ref 89–137)
KCT BLD-ACNC: 582 SEC (ref 89–137)
KCT BLD-ACNC: 582 SEC (ref 89–137)
MAGNESIUM SERPL-MCNC: 1.7 MG/DL (ref 1.6–2.6)
MCH RBC QN AUTO: 30.4 PG (ref 26.8–34.3)
MCHC RBC AUTO-ENTMCNC: 33.4 G/DL (ref 31.4–37.4)
MCV RBC AUTO: 91 FL (ref 82–98)
O2 CT BLDA-SCNC: 18.6 ML/DL (ref 16–23)
OXYHGB MFR BLDA: 96.7 % (ref 94–97)
P AXIS: 12 DEGREES
P AXIS: 63 DEGREES
P AXIS: 66 DEGREES
P AXIS: 72 DEGREES
PCO2 BLD: 24 MMOL/L (ref 21–32)
PCO2 BLD: 24 MMOL/L (ref 21–32)
PCO2 BLD: 25 MMOL/L (ref 21–32)
PCO2 BLD: 25 MMOL/L (ref 21–32)
PCO2 BLD: 26 MMOL/L (ref 21–32)
PCO2 BLD: 29 MMOL/L (ref 21–32)
PCO2 BLD: 29 MMOL/L (ref 21–32)
PCO2 BLD: 36.7 MM HG (ref 36–44)
PCO2 BLD: 42.1 MM HG (ref 36–44)
PCO2 BLD: 42.8 MM HG (ref 42–50)
PCO2 BLD: 43.6 MM HG (ref 36–44)
PCO2 BLD: 44.1 MM HG (ref 36–44)
PCO2 BLD: 46.1 MM HG (ref 36–44)
PCO2 BLD: 47.1 MM HG (ref 36–44)
PCO2 BLD: 49 MM HG (ref 36–44)
PCO2 BLD: 49.7 MM HG (ref 36–44)
PCO2 BLD: 51.8 MM HG (ref 42–50)
PCO2 BLDA: 33.1 MM HG (ref 36–44)
PH BLD: 7.29 [PH] (ref 7.35–7.45)
PH BLD: 7.3 [PH] (ref 7.35–7.45)
PH BLD: 7.3 [PH] (ref 7.35–7.45)
PH BLD: 7.33 [PH] (ref 7.35–7.45)
PH BLD: 7.33 [PH] (ref 7.3–7.4)
PH BLD: 7.35 [PH] (ref 7.3–7.4)
PH BLD: 7.36 [PH] (ref 7.35–7.45)
PH BLD: 7.38 [PH] (ref 7.35–7.45)
PH BLD: 7.39 [PH] (ref 7.35–7.45)
PH BLD: 7.44 [PH] (ref 7.35–7.45)
PH BLDA: 7.32 [PH] (ref 7.35–7.45)
PLATELET # BLD AUTO: 157 THOUSANDS/UL (ref 149–390)
PLATELET # BLD AUTO: 169 THOUSANDS/UL (ref 149–390)
PLATELET # BLD AUTO: 77 THOUSANDS/UL (ref 149–390)
PMV BLD AUTO: 10 FL (ref 8.9–12.7)
PMV BLD AUTO: 10.1 FL (ref 8.9–12.7)
PMV BLD AUTO: 10.3 FL (ref 8.9–12.7)
PO2 BLD: 114 MM HG (ref 75–129)
PO2 BLD: 317 MM HG (ref 75–129)
PO2 BLD: 359 MM HG (ref 75–129)
PO2 BLD: 385 MM HG (ref 75–129)
PO2 BLD: 386 MM HG (ref 75–129)
PO2 BLD: 399 MM HG (ref 75–129)
PO2 BLD: 60 MM HG (ref 35–45)
PO2 BLD: 64 MM HG (ref 35–45)
PO2 BLD: 93 MM HG (ref 75–129)
PO2 BLD: >400 MM HG (ref 75–129)
PO2 BLDA: 114.5 MM HG (ref 75–129)
POTASSIUM BLD-SCNC: 3.6 MMOL/L (ref 3.5–5.3)
POTASSIUM BLD-SCNC: 3.6 MMOL/L (ref 3.5–5.3)
POTASSIUM BLD-SCNC: 3.8 MMOL/L (ref 3.5–5.3)
POTASSIUM BLD-SCNC: 3.9 MMOL/L (ref 3.5–5.3)
POTASSIUM BLD-SCNC: 4 MMOL/L (ref 3.5–5.3)
POTASSIUM BLD-SCNC: 4.2 MMOL/L (ref 3.5–5.3)
POTASSIUM BLD-SCNC: 4.2 MMOL/L (ref 3.5–5.3)
POTASSIUM BLD-SCNC: 4.4 MMOL/L (ref 3.5–5.3)
POTASSIUM SERPL-SCNC: 3.8 MMOL/L (ref 3.5–5.3)
POTASSIUM SERPL-SCNC: 4 MMOL/L (ref 3.5–5.3)
POTASSIUM SERPL-SCNC: 4.4 MMOL/L (ref 3.5–5.3)
PR INTERVAL: 184 MS
PR INTERVAL: 208 MS
PR INTERVAL: 238 MS
PROTHROMBIN TIME: 15.5 SECONDS (ref 11.6–14.5)
PROTHROMBIN TIME: 19.9 SECONDS (ref 11.6–14.5)
PS CM H2O: 8
PS VENT FIO2: 60
PS VENT PEEP: 5
QRS AXIS: 45 DEGREES
QRS AXIS: 55 DEGREES
QRS AXIS: 56 DEGREES
QRS AXIS: 65 DEGREES
QRSD INTERVAL: 72 MS
QRSD INTERVAL: 74 MS
QRSD INTERVAL: 75 MS
QRSD INTERVAL: 75 MS
QT INTERVAL: 338 MS
QT INTERVAL: 362 MS
QT INTERVAL: 392 MS
QT INTERVAL: 408 MS
QTC INTERVAL: 396 MS
QTC INTERVAL: 405 MS
QTC INTERVAL: 407 MS
QTC INTERVAL: 468 MS
RBC # BLD AUTO: 4.18 MILLION/UL (ref 3.88–5.62)
RH BLD: POSITIVE
SAO2 % BLD FROM PO2: 100 % (ref 60–85)
SAO2 % BLD FROM PO2: 88 % (ref 60–85)
SAO2 % BLD FROM PO2: 91 % (ref 60–85)
SAO2 % BLD FROM PO2: 96 % (ref 60–85)
SAO2 % BLD FROM PO2: 98 % (ref 60–85)
SODIUM BLD-SCNC: 140 MMOL/L (ref 136–145)
SODIUM BLD-SCNC: 141 MMOL/L (ref 136–145)
SODIUM BLD-SCNC: 144 MMOL/L (ref 136–145)
SODIUM BLD-SCNC: 145 MMOL/L (ref 136–145)
SODIUM BLD-SCNC: 145 MMOL/L (ref 136–145)
SODIUM BLD-SCNC: 147 MMOL/L (ref 136–145)
SODIUM BLD-SCNC: 147 MMOL/L (ref 136–145)
SODIUM SERPL-SCNC: 144 MMOL/L (ref 136–145)
SODIUM SERPL-SCNC: 149 MMOL/L (ref 136–145)
SPECIMEN EXPIRATION DATE: NORMAL
SPECIMEN SOURCE: ABNORMAL
SPECIMEN SOURCE: NORMAL
SPECIMEN SOURCE: NORMAL
T WAVE AXIS: 102 DEGREES
T WAVE AXIS: 104 DEGREES
T WAVE AXIS: 73 DEGREES
T WAVE AXIS: 78 DEGREES
UNIT DISPENSE STATUS: NORMAL
UNIT DISPENSE STATUS: NORMAL
UNIT PRODUCT CODE: NORMAL
UNIT PRODUCT CODE: NORMAL
UNIT RH: NORMAL
UNIT RH: NORMAL
VENT - PS: ABNORMAL
VENT- AC: AC
VENTRICULAR RATE: 65 BPM
VENTRICULAR RATE: 72 BPM
VENTRICULAR RATE: 79 BPM
VENTRICULAR RATE: 86 BPM
WBC # BLD AUTO: 11.24 THOUSAND/UL (ref 4.31–10.16)

## 2019-12-15 PROCEDURE — 85014 HEMATOCRIT: CPT | Performed by: PHYSICIAN ASSISTANT

## 2019-12-15 PROCEDURE — 30233R1 TRANSFUSION OF NONAUTOLOGOUS PLATELETS INTO PERIPHERAL VEIN, PERCUTANEOUS APPROACH: ICD-10-PCS | Performed by: ANESTHESIOLOGY

## 2019-12-15 PROCEDURE — 88313 SPECIAL STAINS GROUP 2: CPT | Performed by: PATHOLOGY

## 2019-12-15 PROCEDURE — 88304 TISSUE EXAM BY PATHOLOGIST: CPT | Performed by: PATHOLOGY

## 2019-12-15 PROCEDURE — 80048 BASIC METABOLIC PNL TOTAL CA: CPT | Performed by: PHYSICIAN ASSISTANT

## 2019-12-15 PROCEDURE — 85049 AUTOMATED PLATELET COUNT: CPT | Performed by: THORACIC SURGERY (CARDIOTHORACIC VASCULAR SURGERY)

## 2019-12-15 PROCEDURE — 33866 AORTIC HEMIARCH GRAFT: CPT | Performed by: PHYSICIAN ASSISTANT

## 2019-12-15 PROCEDURE — C1768 GRAFT, VASCULAR: HCPCS | Performed by: THORACIC SURGERY (CARDIOTHORACIC VASCULAR SURGERY)

## 2019-12-15 PROCEDURE — 30233N0 TRANSFUSION OF AUTOLOGOUS RED BLOOD CELLS INTO PERIPHERAL VEIN, PERCUTANEOUS APPROACH: ICD-10-PCS | Performed by: THORACIC SURGERY (CARDIOTHORACIC VASCULAR SURGERY)

## 2019-12-15 PROCEDURE — NC001 PR NO CHARGE: Performed by: PHYSICIAN ASSISTANT

## 2019-12-15 PROCEDURE — 85610 PROTHROMBIN TIME: CPT | Performed by: PHYSICIAN ASSISTANT

## 2019-12-15 PROCEDURE — 85049 AUTOMATED PLATELET COUNT: CPT | Performed by: PHYSICIAN ASSISTANT

## 2019-12-15 PROCEDURE — 85730 THROMBOPLASTIN TIME PARTIAL: CPT | Performed by: PHYSICIAN ASSISTANT

## 2019-12-15 PROCEDURE — 85384 FIBRINOGEN ACTIVITY: CPT | Performed by: PHYSICIAN ASSISTANT

## 2019-12-15 PROCEDURE — 85347 COAGULATION TIME ACTIVATED: CPT

## 2019-12-15 PROCEDURE — 88311 DECALCIFY TISSUE: CPT | Performed by: PATHOLOGY

## 2019-12-15 PROCEDURE — 86850 RBC ANTIBODY SCREEN: CPT | Performed by: PHYSICIAN ASSISTANT

## 2019-12-15 PROCEDURE — 86923 COMPATIBILITY TEST ELECTRIC: CPT

## 2019-12-15 PROCEDURE — 99221 1ST HOSP IP/OBS SF/LOW 40: CPT | Performed by: EMERGENCY MEDICINE

## 2019-12-15 PROCEDURE — 85014 HEMATOCRIT: CPT

## 2019-12-15 PROCEDURE — 34716 OPN AX/SUBCLA ART EXPOS CNDT: CPT | Performed by: PHYSICIAN ASSISTANT

## 2019-12-15 PROCEDURE — 82947 ASSAY GLUCOSE BLOOD QUANT: CPT

## 2019-12-15 PROCEDURE — 99223 1ST HOSP IP/OBS HIGH 75: CPT | Performed by: THORACIC SURGERY (CARDIOTHORACIC VASCULAR SURGERY)

## 2019-12-15 PROCEDURE — 84132 ASSAY OF SERUM POTASSIUM: CPT

## 2019-12-15 PROCEDURE — 93010 ELECTROCARDIOGRAM REPORT: CPT | Performed by: INTERNAL MEDICINE

## 2019-12-15 PROCEDURE — 85018 HEMOGLOBIN: CPT | Performed by: PHYSICIAN ASSISTANT

## 2019-12-15 PROCEDURE — 85027 COMPLETE CBC AUTOMATED: CPT | Performed by: EMERGENCY MEDICINE

## 2019-12-15 PROCEDURE — P9037 PLATE PHERES LEUKOREDU IRRAD: HCPCS

## 2019-12-15 PROCEDURE — 71045 X-RAY EXAM CHEST 1 VIEW: CPT

## 2019-12-15 PROCEDURE — 02HV33Z INSERTION OF INFUSION DEVICE INTO SUPERIOR VENA CAVA, PERCUTANEOUS APPROACH: ICD-10-PCS | Performed by: ANESTHESIOLOGY

## 2019-12-15 PROCEDURE — P9100 PATHOGEN TEST FOR PLATELETS: HCPCS

## 2019-12-15 PROCEDURE — 30233K1 TRANSFUSION OF NONAUTOLOGOUS FROZEN PLASMA INTO PERIPHERAL VEIN, PERCUTANEOUS APPROACH: ICD-10-PCS | Performed by: ANESTHESIOLOGY

## 2019-12-15 PROCEDURE — 80048 BASIC METABOLIC PNL TOTAL CA: CPT | Performed by: EMERGENCY MEDICINE

## 2019-12-15 PROCEDURE — 94760 N-INVAS EAR/PLS OXIMETRY 1: CPT

## 2019-12-15 PROCEDURE — 84295 ASSAY OF SERUM SODIUM: CPT

## 2019-12-15 PROCEDURE — 86901 BLOOD TYPING SEROLOGIC RH(D): CPT | Performed by: PHYSICIAN ASSISTANT

## 2019-12-15 PROCEDURE — 93312 ECHO TRANSESOPHAGEAL: CPT

## 2019-12-15 PROCEDURE — 82805 BLOOD GASES W/O2 SATURATION: CPT | Performed by: PHYSICIAN ASSISTANT

## 2019-12-15 PROCEDURE — 33860 PR ASCEND AORTA GRAFT INCL VAVLE SUSPENSION: CPT | Performed by: PHYSICIAN ASSISTANT

## 2019-12-15 PROCEDURE — 34716 OPN AX/SUBCLA ART EXPOS CNDT: CPT | Performed by: THORACIC SURGERY (CARDIOTHORACIC VASCULAR SURGERY)

## 2019-12-15 PROCEDURE — 84132 ASSAY OF SERUM POTASSIUM: CPT | Performed by: PHYSICIAN ASSISTANT

## 2019-12-15 PROCEDURE — 93005 ELECTROCARDIOGRAM TRACING: CPT

## 2019-12-15 PROCEDURE — 82948 REAGENT STRIP/BLOOD GLUCOSE: CPT

## 2019-12-15 PROCEDURE — 02RX0JZ REPLACEMENT OF THORACIC AORTA, ASCENDING/ARCH WITH SYNTHETIC SUBSTITUTE, OPEN APPROACH: ICD-10-PCS | Performed by: THORACIC SURGERY (CARDIOTHORACIC VASCULAR SURGERY)

## 2019-12-15 PROCEDURE — 82330 ASSAY OF CALCIUM: CPT

## 2019-12-15 PROCEDURE — 5A1221Z PERFORMANCE OF CARDIAC OUTPUT, CONTINUOUS: ICD-10-PCS | Performed by: THORACIC SURGERY (CARDIOTHORACIC VASCULAR SURGERY)

## 2019-12-15 PROCEDURE — 5A1223Z PERFORMANCE OF CARDIAC PACING, CONTINUOUS: ICD-10-PCS | Performed by: THORACIC SURGERY (CARDIOTHORACIC VASCULAR SURGERY)

## 2019-12-15 PROCEDURE — 021X0JA BYPASS THORACIC AORTA, ASCENDING/ARCH TO INNOMINATE ARTERY WITH SYNTHETIC SUBSTITUTE, OPEN APPROACH: ICD-10-PCS | Performed by: THORACIC SURGERY (CARDIOTHORACIC VASCULAR SURGERY)

## 2019-12-15 PROCEDURE — 86900 BLOOD TYPING SEROLOGIC ABO: CPT | Performed by: PHYSICIAN ASSISTANT

## 2019-12-15 PROCEDURE — 94002 VENT MGMT INPAT INIT DAY: CPT

## 2019-12-15 PROCEDURE — 82803 BLOOD GASES ANY COMBINATION: CPT

## 2019-12-15 PROCEDURE — 30233M1 TRANSFUSION OF NONAUTOLOGOUS PLASMA CRYOPRECIPITATE INTO PERIPHERAL VEIN, PERCUTANEOUS APPROACH: ICD-10-PCS | Performed by: ANESTHESIOLOGY

## 2019-12-15 PROCEDURE — 36620 INSERTION CATHETER ARTERY: CPT | Performed by: PHYSICIAN ASSISTANT

## 2019-12-15 PROCEDURE — 33866 AORTIC HEMIARCH GRAFT: CPT | Performed by: THORACIC SURGERY (CARDIOTHORACIC VASCULAR SURGERY)

## 2019-12-15 PROCEDURE — C9113 INJ PANTOPRAZOLE SODIUM, VIA: HCPCS | Performed by: PHYSICIAN ASSISTANT

## 2019-12-15 PROCEDURE — 85610 PROTHROMBIN TIME: CPT | Performed by: EMERGENCY MEDICINE

## 2019-12-15 PROCEDURE — 83735 ASSAY OF MAGNESIUM: CPT | Performed by: EMERGENCY MEDICINE

## 2019-12-15 PROCEDURE — 33860 PR ASCEND AORTA GRAFT INCL VAVLE SUSPENSION: CPT | Performed by: THORACIC SURGERY (CARDIOTHORACIC VASCULAR SURGERY)

## 2019-12-15 DEVICE — GELWEAVE GELATIN IMPREGNATED WOVEN VASCULAR PROSTHESIS STRAIGHT
Type: IMPLANTABLE DEVICE | Site: AXILLA | Status: FUNCTIONAL
Brand: GELWEAVE™

## 2019-12-15 DEVICE — GELWEAVE GELATIN IMPREGNATED WOVEN VASCULAR PROSTHESIS STRAIGHT
Type: IMPLANTABLE DEVICE | Site: AORTA | Status: FUNCTIONAL
Brand: GELWEAVE™

## 2019-12-15 RX ORDER — FUROSEMIDE 10 MG/ML
40 INJECTION INTRAMUSCULAR; INTRAVENOUS EVERY 6 HOURS PRN
Status: DISCONTINUED | OUTPATIENT
Start: 2019-12-15 | End: 2019-12-16

## 2019-12-15 RX ORDER — MILRINONE LACTATE 0.2 MG/ML
0.25 INJECTION, SOLUTION INTRAVENOUS CONTINUOUS
Status: DISCONTINUED | OUTPATIENT
Start: 2019-12-15 | End: 2019-12-16

## 2019-12-15 RX ORDER — KETAMINE HYDROCHLORIDE 100 MG/ML
INJECTION, SOLUTION INTRAMUSCULAR; INTRAVENOUS AS NEEDED
Status: DISCONTINUED | OUTPATIENT
Start: 2019-12-15 | End: 2019-12-15 | Stop reason: SURG

## 2019-12-15 RX ORDER — OXYCODONE HYDROCHLORIDE AND ACETAMINOPHEN 5; 325 MG/1; MG/1
2 TABLET ORAL EVERY 6 HOURS PRN
Status: DISCONTINUED | OUTPATIENT
Start: 2019-12-15 | End: 2020-01-07 | Stop reason: HOSPADM

## 2019-12-15 RX ORDER — CEFAZOLIN SODIUM 2 G/50ML
2000 SOLUTION INTRAVENOUS EVERY 8 HOURS
Status: DISCONTINUED | OUTPATIENT
Start: 2019-12-15 | End: 2019-12-16

## 2019-12-15 RX ORDER — TAMSULOSIN HYDROCHLORIDE 0.4 MG/1
0.4 CAPSULE ORAL
Status: DISCONTINUED | OUTPATIENT
Start: 2019-12-15 | End: 2019-12-15 | Stop reason: HOSPADM

## 2019-12-15 RX ORDER — ATORVASTATIN CALCIUM 80 MG/1
80 TABLET, FILM COATED ORAL
Status: DISCONTINUED | OUTPATIENT
Start: 2019-12-15 | End: 2019-12-25

## 2019-12-15 RX ORDER — POLYETHYLENE GLYCOL 3350 17 G/17G
17 POWDER, FOR SOLUTION ORAL DAILY
Status: DISCONTINUED | OUTPATIENT
Start: 2019-12-15 | End: 2020-01-07 | Stop reason: HOSPADM

## 2019-12-15 RX ORDER — PANTOPRAZOLE SODIUM 40 MG/1
40 INJECTION, POWDER, FOR SOLUTION INTRAVENOUS
Status: DISCONTINUED | OUTPATIENT
Start: 2019-12-15 | End: 2019-12-15 | Stop reason: HOSPADM

## 2019-12-15 RX ORDER — CHLORHEXIDINE GLUCONATE 0.12 MG/ML
15 RINSE ORAL 2 TIMES DAILY
Status: DISCONTINUED | OUTPATIENT
Start: 2019-12-15 | End: 2019-12-17

## 2019-12-15 RX ORDER — CEFAZOLIN SODIUM 2 G/50ML
SOLUTION INTRAVENOUS AS NEEDED
Status: DISCONTINUED | OUTPATIENT
Start: 2019-12-15 | End: 2019-12-15 | Stop reason: SURG

## 2019-12-15 RX ORDER — FENTANYL CITRATE 50 UG/ML
25 INJECTION, SOLUTION INTRAMUSCULAR; INTRAVENOUS
Status: DISCONTINUED | OUTPATIENT
Start: 2019-12-15 | End: 2019-12-15 | Stop reason: HOSPADM

## 2019-12-15 RX ORDER — CHLORHEXIDINE GLUCONATE 0.12 MG/ML
15 RINSE ORAL EVERY 12 HOURS SCHEDULED
Status: DISCONTINUED | OUTPATIENT
Start: 2019-12-15 | End: 2019-12-15 | Stop reason: HOSPADM

## 2019-12-15 RX ORDER — MILRINONE LACTATE 0.2 MG/ML
INJECTION, SOLUTION INTRAVENOUS CONTINUOUS PRN
Status: DISCONTINUED | OUTPATIENT
Start: 2019-12-15 | End: 2019-12-15 | Stop reason: SURG

## 2019-12-15 RX ORDER — PANTOPRAZOLE SODIUM 40 MG/1
40 TABLET, DELAYED RELEASE ORAL DAILY
Status: DISCONTINUED | OUTPATIENT
Start: 2019-12-15 | End: 2020-01-07 | Stop reason: HOSPADM

## 2019-12-15 RX ORDER — SODIUM CHLORIDE, SODIUM LACTATE, POTASSIUM CHLORIDE, CALCIUM CHLORIDE 600; 310; 30; 20 MG/100ML; MG/100ML; MG/100ML; MG/100ML
INJECTION, SOLUTION INTRAVENOUS CONTINUOUS PRN
Status: DISCONTINUED | OUTPATIENT
Start: 2019-12-15 | End: 2019-12-15 | Stop reason: SURG

## 2019-12-15 RX ORDER — ASPIRIN 325 MG
325 TABLET ORAL DAILY
Status: DISCONTINUED | OUTPATIENT
Start: 2019-12-15 | End: 2019-12-20

## 2019-12-15 RX ORDER — ROCURONIUM BROMIDE 10 MG/ML
INJECTION, SOLUTION INTRAVENOUS AS NEEDED
Status: DISCONTINUED | OUTPATIENT
Start: 2019-12-15 | End: 2019-12-15 | Stop reason: SURG

## 2019-12-15 RX ORDER — LABETALOL 20 MG/4 ML (5 MG/ML) INTRAVENOUS SYRINGE
10 ONCE
Status: COMPLETED | OUTPATIENT
Start: 2019-12-15 | End: 2019-12-15

## 2019-12-15 RX ORDER — BISACODYL 10 MG
10 SUPPOSITORY, RECTAL RECTAL DAILY PRN
Status: DISCONTINUED | OUTPATIENT
Start: 2019-12-15 | End: 2020-01-07 | Stop reason: HOSPADM

## 2019-12-15 RX ORDER — LIDOCAINE HYDROCHLORIDE 10 MG/ML
10 INJECTION, SOLUTION EPIDURAL; INFILTRATION; INTRACAUDAL; PERINEURAL ONCE
Status: COMPLETED | OUTPATIENT
Start: 2019-12-15 | End: 2019-12-15

## 2019-12-15 RX ORDER — FENTANYL CITRATE 50 UG/ML
50 INJECTION, SOLUTION INTRAMUSCULAR; INTRAVENOUS
Status: DISCONTINUED | OUTPATIENT
Start: 2019-12-15 | End: 2019-12-16

## 2019-12-15 RX ORDER — FENTANYL CITRATE 50 UG/ML
50 INJECTION, SOLUTION INTRAMUSCULAR; INTRAVENOUS ONCE
Status: COMPLETED | OUTPATIENT
Start: 2019-12-15 | End: 2019-12-15

## 2019-12-15 RX ORDER — FONDAPARINUX SODIUM 2.5 MG/.5ML
2.5 INJECTION SUBCUTANEOUS DAILY
Status: DISCONTINUED | OUTPATIENT
Start: 2019-12-16 | End: 2019-12-16

## 2019-12-15 RX ORDER — ONDANSETRON 2 MG/ML
4 INJECTION INTRAMUSCULAR; INTRAVENOUS EVERY 6 HOURS PRN
Status: DISCONTINUED | OUTPATIENT
Start: 2019-12-15 | End: 2019-12-27

## 2019-12-15 RX ORDER — SODIUM CHLORIDE 9 MG/ML
INJECTION, SOLUTION INTRAVENOUS CONTINUOUS PRN
Status: DISCONTINUED | OUTPATIENT
Start: 2019-12-15 | End: 2019-12-15 | Stop reason: SURG

## 2019-12-15 RX ORDER — POTASSIUM CHLORIDE 14.9 MG/ML
20 INJECTION INTRAVENOUS
Status: DISCONTINUED | OUTPATIENT
Start: 2019-12-15 | End: 2019-12-17

## 2019-12-15 RX ORDER — AMIODARONE HYDROCHLORIDE 200 MG/1
200 TABLET ORAL EVERY 8 HOURS SCHEDULED
Status: DISCONTINUED | OUTPATIENT
Start: 2019-12-15 | End: 2019-12-27

## 2019-12-15 RX ORDER — HEPARIN SODIUM 1000 [USP'U]/ML
INJECTION, SOLUTION INTRAVENOUS; SUBCUTANEOUS AS NEEDED
Status: DISCONTINUED | OUTPATIENT
Start: 2019-12-15 | End: 2019-12-15 | Stop reason: SURG

## 2019-12-15 RX ORDER — CALCIUM CHLORIDE 100 MG/ML
1 INJECTION INTRAVENOUS; INTRAVENTRICULAR ONCE
Status: DISCONTINUED | OUTPATIENT
Start: 2019-12-15 | End: 2019-12-16

## 2019-12-15 RX ORDER — HYDROMORPHONE HCL/PF 1 MG/ML
1 SYRINGE (ML) INJECTION
Status: DISCONTINUED | OUTPATIENT
Start: 2019-12-15 | End: 2019-12-16

## 2019-12-15 RX ORDER — ESMOLOL HYDROCHLORIDE 10 MG/ML
25-200 INJECTION, SOLUTION INTRAVENOUS
Status: DISCONTINUED | OUTPATIENT
Start: 2019-12-15 | End: 2019-12-15 | Stop reason: HOSPADM

## 2019-12-15 RX ORDER — LIDOCAINE HYDROCHLORIDE 10 MG/ML
INJECTION, SOLUTION EPIDURAL; INFILTRATION; INTRACAUDAL; PERINEURAL
Status: COMPLETED
Start: 2019-12-15 | End: 2019-12-15

## 2019-12-15 RX ORDER — PROTAMINE SULFATE 10 MG/ML
INJECTION, SOLUTION INTRAVENOUS AS NEEDED
Status: DISCONTINUED | OUTPATIENT
Start: 2019-12-15 | End: 2019-12-15 | Stop reason: SURG

## 2019-12-15 RX ORDER — METHYLPREDNISOLONE SODIUM SUCCINATE 125 MG/2ML
INJECTION, POWDER, LYOPHILIZED, FOR SOLUTION INTRAMUSCULAR; INTRAVENOUS AS NEEDED
Status: DISCONTINUED | OUTPATIENT
Start: 2019-12-15 | End: 2019-12-15 | Stop reason: SURG

## 2019-12-15 RX ORDER — POTASSIUM CHLORIDE 14.9 MG/ML
20 INJECTION INTRAVENOUS ONCE AS NEEDED
Status: DISCONTINUED | OUTPATIENT
Start: 2019-12-15 | End: 2019-12-17

## 2019-12-15 RX ORDER — ACETAMINOPHEN 325 MG/1
650 TABLET ORAL EVERY 4 HOURS PRN
Status: DISCONTINUED | OUTPATIENT
Start: 2019-12-15 | End: 2020-01-07 | Stop reason: HOSPADM

## 2019-12-15 RX ORDER — PROPOFOL 10 MG/ML
INJECTION, EMULSION INTRAVENOUS AS NEEDED
Status: DISCONTINUED | OUTPATIENT
Start: 2019-12-15 | End: 2019-12-15 | Stop reason: SURG

## 2019-12-15 RX ORDER — OXYCODONE HYDROCHLORIDE AND ACETAMINOPHEN 5; 325 MG/1; MG/1
1 TABLET ORAL EVERY 4 HOURS PRN
Status: DISCONTINUED | OUTPATIENT
Start: 2019-12-15 | End: 2020-01-07 | Stop reason: HOSPADM

## 2019-12-15 RX ORDER — MAGNESIUM SULFATE HEPTAHYDRATE 40 MG/ML
2 INJECTION, SOLUTION INTRAVENOUS ONCE
Status: COMPLETED | OUTPATIENT
Start: 2019-12-15 | End: 2019-12-15

## 2019-12-15 RX ORDER — SODIUM CHLORIDE 450 MG/100ML
20 INJECTION, SOLUTION INTRAVENOUS CONTINUOUS
Status: DISCONTINUED | OUTPATIENT
Start: 2019-12-15 | End: 2019-12-16

## 2019-12-15 RX ORDER — FENTANYL CITRATE 50 UG/ML
INJECTION, SOLUTION INTRAMUSCULAR; INTRAVENOUS AS NEEDED
Status: DISCONTINUED | OUTPATIENT
Start: 2019-12-15 | End: 2019-12-15 | Stop reason: SURG

## 2019-12-15 RX ORDER — LABETALOL 20 MG/4 ML (5 MG/ML) INTRAVENOUS SYRINGE
20 ONCE
Status: COMPLETED | OUTPATIENT
Start: 2019-12-15 | End: 2019-12-15

## 2019-12-15 RX ORDER — ACETAMINOPHEN 650 MG/1
650 SUPPOSITORY RECTAL EVERY 4 HOURS PRN
Status: DISCONTINUED | OUTPATIENT
Start: 2019-12-15 | End: 2019-12-16

## 2019-12-15 RX ORDER — MIDAZOLAM HYDROCHLORIDE 2 MG/2ML
INJECTION, SOLUTION INTRAMUSCULAR; INTRAVENOUS AS NEEDED
Status: DISCONTINUED | OUTPATIENT
Start: 2019-12-15 | End: 2019-12-15 | Stop reason: SURG

## 2019-12-15 RX ORDER — VANCOMYCIN HYDROCHLORIDE 1 G/20ML
INJECTION, POWDER, LYOPHILIZED, FOR SOLUTION INTRAVENOUS AS NEEDED
Status: DISCONTINUED | OUTPATIENT
Start: 2019-12-15 | End: 2019-12-15 | Stop reason: HOSPADM

## 2019-12-15 RX ADMIN — MUPIROCIN 1 APPLICATION: 20 OINTMENT TOPICAL at 08:16

## 2019-12-15 RX ADMIN — DEXMEDETOMIDINE 0.3 MCG/KG/HR: 100 INJECTION, SOLUTION, CONCENTRATE INTRAVENOUS at 11:19

## 2019-12-15 RX ADMIN — NOREPINEPHRINE BITARTRATE 14 MCG/MIN: 1 INJECTION INTRAVENOUS at 17:30

## 2019-12-15 RX ADMIN — PHENYLEPHRINE HYDROCHLORIDE 30 MCG/MIN: 10 INJECTION INTRAVENOUS at 20:09

## 2019-12-15 RX ADMIN — SODIUM CHLORIDE, SODIUM LACTATE, POTASSIUM CHLORIDE, AND CALCIUM CHLORIDE 500 ML: .6; .31; .03; .02 INJECTION, SOLUTION INTRAVENOUS at 17:00

## 2019-12-15 RX ADMIN — AMIODARONE HYDROCHLORIDE 200 MG: 200 TABLET ORAL at 21:03

## 2019-12-15 RX ADMIN — KETAMINE HYDROCHLORIDE 100 MG: 100 INJECTION INTRAMUSCULAR; INTRAVENOUS at 09:32

## 2019-12-15 RX ADMIN — SODIUM CHLORIDE, SODIUM LACTATE, POTASSIUM CHLORIDE, AND CALCIUM CHLORIDE: .6; .31; .03; .02 INJECTION, SOLUTION INTRAVENOUS at 09:20

## 2019-12-15 RX ADMIN — AMINOCAPROIC ACID 1 MG/HR: 250 INJECTION, SOLUTION INTRAVENOUS at 09:32

## 2019-12-15 RX ADMIN — ROCURONIUM BROMIDE 100 MG: 50 INJECTION, SOLUTION INTRAVENOUS at 08:50

## 2019-12-15 RX ADMIN — SODIUM CHLORIDE: 0.9 INJECTION, SOLUTION INTRAVENOUS at 08:50

## 2019-12-15 RX ADMIN — LABETALOL 20 MG/4 ML (5 MG/ML) INTRAVENOUS SYRINGE 20 MG: at 07:23

## 2019-12-15 RX ADMIN — PROPOFOL 80 MG: 10 INJECTION, EMULSION INTRAVENOUS at 08:50

## 2019-12-15 RX ADMIN — SODIUM CHLORIDE 2 UNITS/HR: 9 INJECTION, SOLUTION INTRAVENOUS at 11:19

## 2019-12-15 RX ADMIN — CHLORHEXIDINE GLUCONATE 0.12% ORAL RINSE 15 ML: 1.2 LIQUID ORAL at 08:16

## 2019-12-15 RX ADMIN — LIDOCAINE HYDROCHLORIDE 10 ML: 10 INJECTION, SOLUTION EPIDURAL; INFILTRATION; INTRACAUDAL; PERINEURAL at 01:00

## 2019-12-15 RX ADMIN — PANTOPRAZOLE SODIUM 40 MG: 40 INJECTION, POWDER, FOR SOLUTION INTRAVENOUS at 08:06

## 2019-12-15 RX ADMIN — LABETALOL 20 MG/4 ML (5 MG/ML) INTRAVENOUS SYRINGE 10 MG: at 01:02

## 2019-12-15 RX ADMIN — SODIUM CHLORIDE 10 MG/HR: 0.9 INJECTION, SOLUTION INTRAVENOUS at 01:13

## 2019-12-15 RX ADMIN — VASOPRESSIN 0.04 UNITS/MIN: 20 INJECTION INTRAVENOUS at 17:25

## 2019-12-15 RX ADMIN — ESMOLOL HYDROCHLORIDE 200 MCG/KG/MIN: 10 INJECTION INTRAVENOUS at 02:31

## 2019-12-15 RX ADMIN — SODIUM CHLORIDE 5 MG/HR: 0.9 INJECTION, SOLUTION INTRAVENOUS at 00:14

## 2019-12-15 RX ADMIN — FENTANYL CITRATE 1000 MCG: 50 INJECTION, SOLUTION INTRAMUSCULAR; INTRAVENOUS at 09:32

## 2019-12-15 RX ADMIN — MAGNESIUM SULFATE HEPTAHYDRATE 2 G: 40 INJECTION, SOLUTION INTRAVENOUS at 16:42

## 2019-12-15 RX ADMIN — MILRINONE LACTATE IN DEXTROSE 0.3 MCG/KG/MIN: 200 INJECTION, SOLUTION INTRAVENOUS at 14:34

## 2019-12-15 RX ADMIN — PROTAMINE SULFATE 10 MG: 10 INJECTION, SOLUTION INTRAVENOUS at 14:23

## 2019-12-15 RX ADMIN — SODIUM CHLORIDE 12 MG/HR: 0.9 INJECTION, SOLUTION INTRAVENOUS at 06:47

## 2019-12-15 RX ADMIN — ESMOLOL HYDROCHLORIDE 200 MCG/KG/MIN: 10 INJECTION INTRAVENOUS at 04:42

## 2019-12-15 RX ADMIN — NOREPINEPHRINE BITARTRATE 5 MCG/MIN: 1 INJECTION INTRAVENOUS at 14:26

## 2019-12-15 RX ADMIN — MIDAZOLAM 4 MG: 1 INJECTION INTRAMUSCULAR; INTRAVENOUS at 08:50

## 2019-12-15 RX ADMIN — MUPIROCIN 1 APPLICATION: 20 OINTMENT TOPICAL at 20:06

## 2019-12-15 RX ADMIN — CEFAZOLIN SODIUM 2000 MG: 2 SOLUTION INTRAVENOUS at 20:42

## 2019-12-15 RX ADMIN — HEPARIN SODIUM 5000 UNITS: 1000 INJECTION INTRAVENOUS; SUBCUTANEOUS at 10:29

## 2019-12-15 RX ADMIN — OXYCODONE HYDROCHLORIDE AND ACETAMINOPHEN 2 TABLET: 5; 325 TABLET ORAL at 21:03

## 2019-12-15 RX ADMIN — HEPARIN SODIUM 31000 UNITS: 1000 INJECTION INTRAVENOUS; SUBCUTANEOUS at 10:56

## 2019-12-15 RX ADMIN — CEFAZOLIN SODIUM 2000 MG: 2 SOLUTION INTRAVENOUS at 13:21

## 2019-12-15 RX ADMIN — PROTAMINE SULFATE 340 MG: 10 INJECTION, SOLUTION INTRAVENOUS at 14:24

## 2019-12-15 RX ADMIN — METHYLPREDNISOLONE SODIUM SUCCINATE 1000 MG: 125 INJECTION, POWDER, FOR SOLUTION INTRAMUSCULAR; INTRAVENOUS at 11:19

## 2019-12-15 RX ADMIN — AMINOCAPROIC ACID 5 G: 250 INJECTION, SOLUTION INTRAVENOUS at 09:32

## 2019-12-15 RX ADMIN — CHLORHEXIDINE GLUCONATE 0.12% ORAL RINSE 15 ML: 1.2 LIQUID ORAL at 17:48

## 2019-12-15 RX ADMIN — CEFAZOLIN SODIUM 2000 MG: 2 SOLUTION INTRAVENOUS at 09:28

## 2019-12-15 RX ADMIN — FENTANYL CITRATE 25 MCG: 50 INJECTION, SOLUTION INTRAMUSCULAR; INTRAVENOUS at 19:29

## 2019-12-15 RX ADMIN — SODIUM CHLORIDE 20 ML/HR: 0.45 INJECTION, SOLUTION INTRAVENOUS at 16:00

## 2019-12-15 NOTE — CONSULTS
[address] [facility] 70 y o  male MRN: [id number]  Unit/Bed#: [facility] 413-01 Encounter: [id number]      Physician Requesting Consult: Raffy Zepeda DO    Reason for Consult / Principal Problem: Dissection of thoracic aorta [facility])    HPI: Ivon Hatch is a 70 y o  male who presented with CP, nausea, diaphoresis  CTA showed a type aortic dissection  He was taken to the OR emergently for the following procedure:  Replacement of ascending aorta with aggressive hemiarch reconstruction and aortic valve resuspension with a 26 mm Dacron graft  Post-op Echo showed LVEF 65%, dilated & hypokinetic RV  Patient to ICU intubated abd sedated  History obtained from chart review due to patient being intubated and sedated  ---------------------------------------------------------------------------------------------------------------------------------------------------------------------  Impressions:  1  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  2  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  3  Hypertension  4  CKD stage 3  5  BPH  6  GERD  7  Hyperlipidemia    Plan:    Neuro: Awake & following commands  Trend neuro exam   Delirium precautions  CV: MAP goal >65  CI>2 2  Post-op medications: Epinephrine, 4 mcg/min, Primacor, 0 38 mcg/kg/min, Levophed, 14 mcg/min, Vasopressin, 0 04 Units/min  Volume resuscitation as needed, but carefully with RV dysfunction  Monitor rhythm on telemetry  Intra-op DYLAN LVEF 65%  RV dilated  Lung: Check STAT post-op ABG and CXR  Wean vent with spontaneous breathing trial with goal to extubate  GI: GI prophylaxis with PPI  Bowel regimen  Zofran PRN for nausea  FEN: NPO  Replete K >4 0, mag >2 0 and calcium >7 0  : Check STAT post-op BMP  Hills in place  Monitor UOP with goal >0 5cc/kg/hour   Lasix versus volume resuscitate as needed depending on hemodynamics and volume status  ID: Prophylactic post-op abx  Maintain normothermia  Trend temps  Heme: Check STAT post-op H/H and platelets  Monitor incision site, invasive lines, and chest tube outputs for bleeding  Send coag panel if needed  Endo: Insulin gtt for blood sugar control  Disposition: ICU Care   ---------------------------------------------------------------------------------------------------------------------------------------------------------------------  Historical Information   Past Medical History:   Diagnosis Date    Arthritis     BPH without urinary obstruction     CKD (chronic kidney disease), stage III (HCC)     baseline 1 6-1 7    GERD (gastroesophageal reflux disease)     Hyperlipidemia     Hypertension      Past Surgical History:   Procedure Laterality Date    APPENDECTOMY      COLONOSCOPY  2017    FRACTURE SURGERY      [place] RECONSTR TOTAL SHOULDER IMPLANT Right 2017    Procedure: ARTHROPLASTY SHOULDER REVERSE with OPEN CYST EXCISION;  Surgeon: Jackelyn Coello MD;  Location: [facility];  Service: Orthopedics    SHOULDER SURGERY      WRIST SURGERY       Social History   Social History     Substance and Sexual Activity   Alcohol Use Not Currently     Social History     Substance and Sexual Activity   Drug Use Not Currently     Social History     Tobacco Use   Smoking Status Former Smoker    Packs/day: 1 00    Last attempt to quit: Kelsea Hanson Years since quittin 9   Smokeless Tobacco Never Used     Family History   Problem Relation Age of Onset    No Known Problems Mother     Hypertension Father     Arthritis Family      Family history unknown and I have reviewed this patient's family history and commented on sigificant items within the HPI    ROS: ROS unable to be obtained due to patient being intubated and sedated  Allergies: No Known Allergies    Home Medications:   Prior to Admission medications    Medication Sig Start Date End Date Taking? Authorizing Provider   amLODIPine (NORVASC) 10 mg tablet Reported on 11/30/2016 11/26/16  Yes Historical Provider, MD   FLUAD 0 5 ML GEORGIE inject 0 5 milliliter intramuscularly 9/15/18   Historical Provider, MD   hydrALAZINE (APRESOLINE) 25 mg tablet  4/30/19   Historical Provider, MD   hydrALAZINE (APRESOLINE) 50 mg tablet Take 1 tablet (50 mg total) by mouth every 8 (eight) hours for 30 days 4/25/19 5/25/19  Jamal Chavez MD   hydrochlorothiazide (HYDRODIURIL) 25 mg tablet TAKE 1 TABLET (25 MG TOTAL) BY MOUTH DAILY IN THE EARLY MORNING 2/23/18   Historical Provider, MD   losartan (COZAAR) 100 MG tablet take 1 tablet by mouth once daily (STOP LOSARTAN 50MG UNLESS YOUR DOCTOR WANTS OTHERWISE) 1/8/18   Historical Provider, MD   losartan (COZAAR) 50 mg tablet take 1 tablet by mouth once daily 11/26/16   Historical Provider, MD   losartan-hydrochlorothiazide (HYZAAR) 100-25 MG per tablet Take 1 tablet by mouth daily for 90 days 4/5/19 7/4/19  Carisa Castro MD   omeprazole (PriLOSEC) 20 mg delayed release capsule take 1 capsule by mouth daily at bedtime  Patient not taking: Reported on 4/5/2019 4/13/18   Elder Dickson MD   [facility] 50 MCG SUSR inject 0 5 milliliter intramuscularly 9/15/18   Historical Provider, MD   tamsulosin (FLOMAX) 0 4 mg  3/2/17   Historical Provider, MD     Inpatient Medications:  Scheduled Meds:  Current Facility-Administered Medications:  acetaminophen 650 mg Rectal Q4H PRN Marisol Gresham PA-C    acetaminophen 650 mg Oral Q4H PRN Marisol Gresham PA-C    amiodarone 200 mg Oral Q8H [address] Marisol Gresham PA-C    aspirin 325 mg Oral Daily Marisol Gresham PA-C    atorvastatin 80 mg Oral Daily [employer]alia[employer]    bisacodyl 10 mg Rectal Daily PRN Marisol Gresham PA-C    calcium chloride 1 g Intravenous Once Marisol Gresham PA-C    cefazolin 2,000 mg Intravenous Q8H Marisol Mp, PA-C    chlorhexidine 15 mL Swish & Spit BID Marisol Gresham PA-C    fentanyl citrate (PF) 50 mcg Intravenous Once Ivanna Cook PA-C    fentanyl citrate (PF) 50 mcg Intravenous Q1H PRN Ivanna Cook PA-C    [START ON 12/16/2019] fondaparinux 2 5 mg Subcutaneous Daily Ivanna Cook PA-C    furosemide 40 mg Intravenous Q6H PRN Ivanna Cook PA-C    HYDROmorphone 1 mg Intravenous Q1H PRN Ivanna Cook PA-C    insulin regular (HumuLIN R,NovoLIN R) infusion 0 3-21 Units/hr Intravenous Titrated Ivanna Cook PA-C    lactated ringers 500 mL Intravenous Q30 Min PRN Ivanna Cook PA-C    lidocaine (cardiac) 100 mg Intravenous Q30 Min PRN Ivanna Cook PA-C    magnesium sulfate 2 g Intravenous Once Ivanna Cook PA-C    mupirocin 1 application Nasal S90W [address] Ivanna Cook PA-C    niCARdipine 2 5-15 mg/hr Intravenous Titrated Ivanna Cook PA-C    ondansetron 4 mg Intravenous Q6H PRN Ivanna Cook PA-C    oxyCODONE-acetaminophen 1 tablet Oral Q4H PRN Ivanna Cook PA-C    oxyCODONE-acetaminophen 2 tablet Oral Q6H PRN Ivanna Cook PA-C    pantoprazole 40 mg Oral Daily Ivanna Cook PA-C    polyethylene glycol 17 g Oral Daily Ivanna Cook PA-C    potassium chloride 20 mEq Intravenous Once PRN Ivanna Cook PA-C    potassium chloride 20 mEq Intravenous Q1H PRN Ivanna Cook PA-C    potassium chloride 20 mEq Intravenous Q30 Min PRN Ivanna Cook PA-C    sodium chloride 20 mL/hr Intravenous Continuous Ivanna Cook PA-C Last Rate: 20 mL/hr (12/15/19 1600)     Continuous Infusions:  insulin regular (HumuLIN R,NovoLIN R) infusion 0 3-21 Units/hr    niCARdipine 2 5-15 mg/hr    sodium chloride 20 mL/hr Last Rate: 20 mL/hr (12/15/19 1600)     PRN Meds:    acetaminophen 650 mg Q4H PRN   acetaminophen 650 mg Q4H PRN   bisacodyl 10 mg Daily PRN   fentanyl citrate (PF) 50 mcg Q1H PRN   furosemide 40 mg Q6H PRN   HYDROmorphone 1 mg Q1H PRN   lactated ringers 500 mL Q30 Min PRN   lidocaine (cardiac) 100 mg Q30 Min PRN   ondansetron 4 mg Q6H PRN   oxyCODONE-acetaminophen 1 tablet Q4H PRN oxyCODONE-acetaminophen 2 tablet Q6H PRN   potassium chloride 20 mEq Once PRN   potassium chloride 20 mEq Q1H PRN   potassium chloride 20 mEq Q30 Min PRN     ---------------------------------------------------------------------------------------------------------------------------------------------------------------------  Vitals:   Vitals:    12/15/19 0600 12/15/19 0700 12/15/19 0800 12/15/19 1558   BP: 110/58 104/67 96/57    BP Location:       Pulse: 76 68 70    Resp:  19    Temp:   98 4 °F (36 9 °C)    TempSrc:   Oral    SpO2: 93% 92% 96% 98%   Weight:       Height:         Arterial Line:  Arterial Line BP: 102/56  Arterial Line MAP (mmHg): 72 mmHg    Temperature: Temp (24hrs), Av 2 °F (36 8 °C), Min:97 9 °F (36 6 °C), Max:98 4 °F (36 9 °C)  Current: Temperature: 98 4 °F (36 9 °C)    Weights: IBW: 70 7 kg  Body mass index is 26 57 kg/m²  Hemodynamic Monitoring:  PAP:  , CVP:  , CO:  , CI:  , SVR:      Ventilator Settings:  Respiratory    Lab Data (Last 4 hours)    None         O2/Vent Data (Last 4 hours)      12/15 1558 12/15 1623         Vent Mode AC/VC       Resp Rate (BPM) (BPM) 13 16      Vt (mL) (mL) 450       FIO2 (%) (%) 70       PEEP (cmH2O) (cmH2O) 5       Patient safety screen outcome: Failed       MV 5 5                 Labs:   Results from last 7 days   Lab Units 12/15/19  1619 12/15/19  1607 12/15/19  1458  12/15/19  1354  12/15/19  0354 19   WBC Thousand/uL  --   --   --   --   --   --  11 24* 9 07   HEMOGLOBIN g/dL  --  12 6  --   --   --   --  12 7 14 8   I STAT HEMOGLOBIN g/dl 11 9*  --  10 5*   < >  --    < >  --   --    HEMATOCRIT %  --  38 5  --   --   --   --  38 0 44 3   HEMATOCRIT, ISTAT % 35*  --  31*   < >  --    < >  --   --    PLATELETS Thousands/uL  --  169  --   --  77*  --  157 169   NEUTROS PCT %  --   --   --   --   --   --   --  70   MONOS PCT %  --   --   --   --   --   --   --  10    < > = values in this interval not displayed       Results from last 7 days   Lab Units 12/15/19  1619 12/15/19  1607 12/15/19  1458 12/15/19  1412  12/15/19  0354 19   SODIUM mmol/L  --  149*  --   --   --  144 141   POTASSIUM mmol/L  --  4 0  --   --   --  3 8 4 5   CHLORIDE mmol/L  --  115*  --   --   --  116* 110*   CO2 mmol/L  --  24  --   --   --  24 26   CO2, I-STAT mmol/L 24  --  24 26   < >  --   --    BUN mg/dL  --  33*  --   --   --  32* 31*   CREATININE mg/dL  --  2 10*  --   --   --  1 70* 1 86*   CALCIUM mg/dL  --  8 3  --   --   --  8 4 9 3   ALK PHOS U/L  --   --   --   --   --   --  54   ALT U/L  --   --   --   --   --   --  29   AST U/L  --   --   --   --   --   --  39   GLUCOSE, ISTAT mg/dl 95  --  104 131   < >  --   --     < > = values in this interval not displayed  Post-op AB 28/47/93/23/-4/96%  Post-op CXR: clear  ETT at [facility] I have personally reviewed pertinent films in PACS  Post-op EKG: NSR This was personally reviewed by myself  Physical Exam   Constitutional: He is oriented to person, place, and time  No distress  Eyes: Pupils are equal, round, and reactive to light  Scleral icterus is present  Neck: No JVD present  Cardiovascular: Normal rate and regular rhythm  No murmur heard  Pulmonary/Chest: Effort normal and breath sounds normal    Abdominal: Soft  He exhibits no distension  There is no tenderness  Musculoskeletal: He exhibits no edema  Moves all 4 extremities to command   Neurological: He is alert and oriented to person, place, and time  No cranial nerve deficit  Moves all 4 extremities equally   Skin: Skin is warm and dry  Invasive lines and devices:   Invasive Devices     Central Venous Catheter Line            CVC Central Lines 12/15/19 Triple less than 1 day    Introducer 12/15/19 less than 1 day          Peripheral Intravenous Line            Peripheral IV 19 Left Antecubital less than 1 day    Peripheral IV 19 Right Antecubital less than 1 day    Peripheral IV 12/15/19 Right Antecubital less than 1 day    Peripheral IV 12/15/19 Right Wrist less than 1 day          Arterial Line            Arterial Line 12/15/19 Radial less than 1 day    Arterial Line 12/15/19 Right Radial less than 1 day          Line            Pacer Wires less than 1 day    Pacer Wires less than 1 day          Drain            Chest Tube 1 Left Mediastinal 24 Fr  less than 1 day    Chest Tube 2 Anterior Mediastinal 32 Fr  less than 1 day    Chest Tube 3 Posterior Mediastinal 32 Fr  less than 1 day    NG/OG/Enteral Tube Orogastric 18 Fr Center mouth less than 1 day    Urethral Catheter Non-latex; Temperature probe less than 1 day          Airway            ETT  Hi-Lo; Cuffed;Oral 8 mm less than 1 day              ---------------------------------------------------------------------------------------------------------------------------------------------------------------------  Counseling / Coordination of Care  Total Critical Care time spent 30 minutes excluding procedures, teaching and family updates        SIGNATURE: EL Mattson  DATE: December 15, 2019  TIME: 4:52 PM

## 2019-12-15 NOTE — OP NOTE
OPERATIVE REPORT  PATIENT NAME: Marlin Sharpe    :  1940  MRN: [id number]  Pt Location:  OR ROOM 16    SURGERY DATE: 12/15/2019    SURGEON: Deondre Layton DO     ASSISTANT: Jerri Watts PA-C    PREOPERATIVE DIAGNOSIS: 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, Ascending aortic aneurysm    POSTOPERATIVE DIAGNOSIS: 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, Ascending aortic aneurysm     NYHA CLASS: 2    PROCEDURE: Replacement of ascending aorta with aggressive hemiarch reconstruction and aortic valve resuspension with a 26 mm Dacron graft  ANESTHESIA: General endotracheal anesthesia with transesophageal echocardiogram guidance, Dr Merly Hoyt     ESTIMATED BLOOD LOSS: 750 mL  TRANSFUSIONS: 2 plt     SPECIMENS: Ascending aorta  CARDIOPULMONARY BYPASS TIME: 153 minutes  CROSSCLAMP TIME: 106 minutes  ANTEGRADE CEREBRAL PERFUSION/CIRCULATORY ARREST TIME: 26 minutes    CHEST TUBES;  X 3     PACING WIRES: A x1, v x1  OPERATIVE TECHNIQUE: The patient was taken to the operating room and placed supine on the operating table  Following the satisfactory induction of general anesthesia and placement of monitoring lines, the patient was prepped and draped in the usual sterile fashion  A time-out procedure was performed  The right axillary artery was exposed via a 4cm infraclavicular incision  The artery was encircled with vessel loops and 5000U of heparin was administered  The artery was clamped proximally and distally and an arteriotomy was performed  An 8mm dacron graft was sewn to the axillary artery using 5-0 prolene suture in and end to side fashion  The graft was de-aired and connected to the arterial limb of the cardiopulmonary bypass circuit  The patient underwent median sternotomy, pericardiotomy, and systemic heparinization  The innominate vein was mobilized and the innominate artery was identified and encircled with an umbilical tape    The innominate artery was encircled with an umbilical tape  The patient underwent right atrial cannulation and a retrograde was placed  The patient was initiated on bypass, and a left ventricular vent was placed through the right superior pulmonary vein  Cooling was initiated to 28 degrees Celsius  The ascending aorta was cross clamped and opened at the level of the sinotubular junction  The coronary ostia were directly visualized and antegrade cardioplegia was delivered directly down the coronary ostia with an excellent arrest  Intermittent antegrade cardioplegia was delivered throughout the remainder of the crossclamp period  The ascending aorta was transected and resected up to the level of the crossclamp  The aorta was dissected above the level of the crossclamp to the extent possible with the cross-clamp in position  Attention was turned to the root  The aorta was resected down to the level of the sinotubular junction  The intramural hematoma did not extend down into the root  The valve was a structurally normal trileaflet valve  I felt that the valve could be spared  The valve was resuspended by placing 4-0 pledgeted Prolene sutures above each commissure  At this point, the patient had reached a core temperature of 28 degrees  A 26 mm graft was selected  The patient was placed in Trendelenburg position  The innominate artery was clamped and the cardiopulmonary bypass flow was turned down to 1 liter per minute, thus establishing a period of systemic circulatory arrest with antegrade cerebral perfusion  The crossclamp was removed from the ascending aorta and the aorta was resected up to the level of the innominate artery and fashioned for aggressive hemiarch anastomosis  There was no tear evident in the intima of the visualized arch  The 26 mm graft was brought to the field and sutured to the hemiarch  in an end-to-end fashion using running 4-0 Prolene suture  The graft was de-aired and crossclamped   The clamp was removed from the proximal innominate artery and the cardiopulmonary bypass flow was returned to normal  The circulatory arrest time with antegrade cerebral perfusion was 26 minutes  Bleeding points were secured with pledgeted 4-0 sutures  The graft was trimmed to the appropriate length  Rewarming was initiated  Attention was turned to the root  The 26 mm graft was anastomosed to the sinotubular junction in an end-to-end fashion using running 4-0 Prolene suture  The lata-root was filled with antegrade cardioplegia and the anastomosis and the valve were tested  The anastomosis appeared to be hemostatic  The valve appeared to be competent  The proximal graft was trimmed to the appropriate length and anastomosed to the distal graft in end-to-end fashion using running 4-0 Prolene suture  A de-airing vent was placed in the neoaorta  The heart was extensively de-aired and the crossclamp was removed  Atrial and ventricular pacing wires were placed  Following a period of reperfusion, the patient was weaned from cardiopulmonary bypass and decannulated  Protamine was administered with normalization of the ACT  Hemostasis was confirmed in all fields  Thoracostomy tubes were placed  The sternum was closed with stainless steel wires  The fascia, subdermis, and skin were closed with multiple layers of running absorbable suture  As the attending surgeon, I was present and scrubbed for all critical portions of this procedure  There was no qualified surgical resident available  Sponge, needle, and instrument counts were reported as correct by the nursing staff  The assistance of a PA was required to complete this case, specifically for assistance with cannulation, decannulation, retraction, and exposure during valve repair and ascending aorta replacement   Final transesophageal echocardiogram demonstrated normal left ventricular function and moderate right ventricular dysfunction with severe TR        SIGNATURE: Jose Saunders, DO  DATE: December 15, 2019  TIME: 3:57 PM

## 2019-12-15 NOTE — ANESTHESIA PROCEDURE NOTES
Arterial Line Insertion  Performed by: Manuel Donahue MD  Authorized by: Manuel Donahue MD   Consent: The procedure was performed in an emergent situation  Verbal consent obtained  Risks and benefits: risks, benefits and alternatives were discussed  Consent given by: patient  Patient understanding: patient states understanding of the procedure being performed  Patient consent: the patient's understanding of the procedure matches consent given  Procedure consent: procedure consent matches procedure scheduled  Relevant documents: relevant documents present and verified  Required items: required blood products, implants, devices, and special equipment available  Patient identity confirmed: arm band  Time out: Immediately prior to procedure a "time out" was called to verify the correct patient, procedure, equipment, support staff and site/side marked as required  Preparation: Patient was prepped and draped in the usual sterile fashion    Indications: hemodynamic monitoring  Orientation:  Right  Location: radial artery  Procedure Details:  Shade's test normal: yes  Needle gauge: 20  Seldinger technique: Seldinger technique used  Number of attempts: 1    Post-procedure:  Post-procedure: dressing applied  Waveform: good waveform and waveform confirmed  Post-procedure CNS: unchanged and normal  Patient tolerance: Patient tolerated the procedure well with no immediate complications  Comments: Procedure start 0912  Procedure end 0915

## 2019-12-15 NOTE — ED PROVIDER NOTES
History  Chief Complaint   Patient presents with    Chest Pain     pt was sitting and began with "stabbing" mid sternal chest pain  Denies cardiac HX  reports associated SOB  Reports 8/10 pain post niro and ASA     62-year-old male with a past medical history of hypertension presents with chest pain started at 6:30 p m  Tonight  Patient describes that came on suddenly in the middle of his chest, radiates to the back, accompanied by feelings of diaphoresis, and several episodes of vomiting while he was in [place]  He described he never had this sensation before  He does describe that the pain is worse when he takes deep breaths, is non positional, and is likely exertional although he cannot say exactly  He has not described any numbness, weakness, tingling  He does not describe any previous history of PE, DVT, or any other blood clot  No recent flights or long distance travel  ROS is otherwise negative  Prior to Admission Medications   Prescriptions Last Dose Informant Patient Reported? Taking?    FLUAD 0 5 ML GEORGIE   Yes No   Sig: inject 0 5 milliliter intramuscularly   SHINGRIX 50 MCG SUSR   Yes No   Sig: inject 0 5 milliliter intramuscularly   amLODIPine (NORVASC) 10 mg tablet   Yes Yes   Sig: Reported on 11/30/2016   hydrALAZINE (APRESOLINE) 25 mg tablet   Yes No   hydrALAZINE (APRESOLINE) 50 mg tablet   No No   Sig: Take 1 tablet (50 mg total) by mouth every 8 (eight) hours for 30 days   hydrochlorothiazide (HYDRODIURIL) 25 mg tablet   Yes No   Sig: TAKE 1 TABLET (25 MG TOTAL) BY MOUTH DAILY IN THE EARLY MORNING   losartan (COZAAR) 100 MG tablet   Yes No   Sig: take 1 tablet by mouth once daily (STOP LOSARTAN 50MG UNLESS YOUR DOCTOR WANTS OTHERWISE)   losartan (COZAAR) 50 mg tablet   Yes No   Sig: take 1 tablet by mouth once daily   losartan-hydrochlorothiazide (HYZAAR) 100-25 MG per tablet   No No   Sig: Take 1 tablet by mouth daily for 90 days   omeprazole (PriLOSEC) 20 mg delayed release capsule Self No No   Sig: take 1 capsule by mouth daily at bedtime   Patient not taking: Reported on 2019   tamsulosin (FLOMAX) 0 4 mg   Yes No      Facility-Administered Medications: None       Past Medical History:   Diagnosis Date    Arthritis     BPH without urinary obstruction     CKD (chronic kidney disease), stage III (HCC)     baseline 1 6-1 7    GERD (gastroesophageal reflux disease)     Hyperlipidemia     Hypertension        Past Surgical History:   Procedure Laterality Date    APPENDECTOMY      COLONOSCOPY  2017    FRACTURE SURGERY      MT RECONSTR TOTAL SHOULDER IMPLANT Right 2017    Procedure: ARTHROPLASTY SHOULDER REVERSE with OPEN CYST EXCISION;  Surgeon: Reynaldo Tee MD;  Location: [facility];  Service: Orthopedics    SHOULDER SURGERY      WRIST SURGERY         Family History   Problem Relation Age of Onset    No Known Problems Mother     Hypertension Father     Arthritis Family      I have reviewed and agree with the history as documented  Social History     Tobacco Use    Smoking status: Former Smoker     Packs/day: 1 00     Last attempt to quit: 1980     Years since quittin 9    Smokeless tobacco: Never Used   Substance Use Topics    Alcohol use: Not Currently    Drug use: Not Currently        Review of Systems   Constitutional: Negative for chills, diaphoresis, fatigue and fever  HENT: Negative for congestion and sore throat  Eyes: Negative for visual disturbance  Respiratory: Negative for cough, chest tightness and shortness of breath  Cardiovascular: Positive for chest pain  Negative for palpitations and leg swelling  Gastrointestinal: Positive for nausea and vomiting  Negative for abdominal distention, abdominal pain, constipation and diarrhea  Genitourinary: Negative for difficulty urinating and dysuria  Musculoskeletal: Positive for back pain  Negative for arthralgias and myalgias  Skin: Negative for rash     Neurological: Negative for dizziness, weakness, light-headedness, numbness and headaches  Psychiatric/Behavioral: Negative for agitation, behavioral problems and confusion  The patient is not nervous/anxious  All other systems reviewed and are negative  Physical Exam  ED Triage Vitals   Temperature Pulse Respirations Blood Pressure SpO2   12/14/19 2002 12/14/19 2002 12/14/19 2002 12/14/19 2015 12/14/19 2002   97 9 °F (36 6 °C) 66 20 (!) 175/78 98 %      Temp Source Heart Rate Source Patient Position - Orthostatic VS BP Location FiO2 (%)   12/14/19 2002 12/14/19 2002 12/14/19 2002 12/14/19 2002 --   Oral Monitor Sitting Right arm       Pain Score       12/14/19 1958       7             Orthostatic Vital Signs  Vitals:    12/15/19 0500 12/15/19 0600 12/15/19 0700 12/15/19 0800   BP: 110/69 110/58 104/67 96/57   Pulse: 76 76 68 70   Patient Position - Orthostatic VS:           Physical Exam   Constitutional: He is oriented to person, place, and time  He appears well-developed and well-nourished  HENT:   Head: Normocephalic and atraumatic  Eyes: EOM are normal    Cardiovascular: Normal rate, regular rhythm and normal heart sounds  No murmur heard  Pulses:       Radial pulses are 2+ on the right side, and 2+ on the left side  Pulmonary/Chest: Effort normal  No respiratory distress  He has no decreased breath sounds  He has no wheezes  He has no rhonchi  He has no rales  Abdominal: Soft  Bowel sounds are normal  He exhibits no distension  There is no tenderness  Musculoskeletal: He exhibits no deformity  Neurological: He is alert and oriented to person, place, and time  Skin: Skin is warm  No rash noted  Psychiatric: He has a normal mood and affect   His behavior is normal  Judgment and thought content normal        ED Medications  Medications   tamsulosin (FLOMAX) capsule 0 4 mg (has no administration in time range)   esmolol (BREVIBLOC) 2500 mg/250 mL IV infusion (premix) (200 mcg/kg/min × 79 4 kg Intravenous New Bag 12/15/19 0442)   pantoprazole (PROTONIX) injection 40 mg ( Intravenous Dose Auto Held 12/18/19 0900)   fentanyl citrate (PF) 100 MCG/2ML 25 mcg ( Intravenous MAR Hold 12/15/19 0837)   niCARdipine (CARDENE) 50 mg (DOUBLE CONCENTRATION) IV in sodium chloride 0 9% 250 mL (12 mg/hr Intravenous New Bag 12/15/19 0647)   mupirocin (BACTROBAN) 2 % nasal ointment ( Nasal Dose Auto Held 12/19/19 2100)   chlorhexidine (PERIDEX) 0 12 % oral rinse 15 mL ( Swish & Spit Dose Auto Held 12/18/19 2100)   sodium chloride 0 9 % bolus 1,000 mL (0 mL Intravenous Stopped 12/14/19 2228)   fentanyl citrate (PF) 100 MCG/2ML 50 mcg (50 mcg Intravenous Given 12/14/19 2114)   iodixanol (VISIPAQUE) 320 MG/ML injection 100 mL (100 mL Intravenous Given 12/14/19 2154)   fentanyl citrate (PF) 100 MCG/2ML 50 mcg (50 mcg Intravenous Given 12/14/19 2211)   Labetalol HCl (NORMODYNE) injection 10 mg (10 mg Intravenous Given 12/14/19 2315)   esmolol (BREVIBLOC) 2500 mg/250 mL IV infusion (premix) (175 mcg/kg/min × 79 4 kg Intravenous Rate/Dose Change 12/15/19 0010)   lidocaine (PF) (XYLOCAINE-MPF) 1 % injection 10 mL (10 mL Infiltration Given 12/15/19 0100)   Labetalol HCl (NORMODYNE) injection 10 mg (10 mg Intravenous Given 12/15/19 0102)   Labetalol HCl (NORMODYNE) injection 20 mg (20 mg Intravenous Given 12/15/19 0723)       Diagnostic Studies  Results Reviewed     Procedure Component Value Units Date/Time    Troponin I [[id number]]  (Normal) Collected:  12/14/19 2322    Lab Status:  Final result Specimen:  Blood from Arm, Right Updated:  12/14/19 2356     Troponin I <0 02 ng/mL     Troponin I [[id number]]  (Normal) Collected:  12/14/19 2005    Lab Status:  Final result Specimen:  Blood from Arm, Left Updated:  12/14/19 2036     Troponin I 0 03 ng/mL     Comprehensive metabolic panel [[id number]]  (Abnormal) Collected:  12/14/19 2005    Lab Status:  Final result Specimen:  Blood from Arm, Left Updated:  12/14/19 2034     Sodium 141 mmol/L      Potassium 4 5 mmol/L      Chloride 110 mmol/L      CO2 26 mmol/L      ANION GAP 5 mmol/L      BUN 31 mg/dL      Creatinine 1 86 mg/dL      Glucose 162 mg/dL      Calcium 9 3 mg/dL      AST 39 U/L      ALT 29 U/L      Alkaline Phosphatase 54 U/L      Total Protein 7 5 g/dL      Albumin 3 1 g/dL      Total Bilirubin 0 53 mg/dL      eGFR 34 ml/min/1 73sq m     Narrative:       National Kidney Disease Foundation guidelines for Chronic Kidney Disease (CKD):     Stage 1 with normal or high GFR (GFR > 90 mL/min/1 73 square meters)    Stage 2 Mild CKD (GFR = 60-89 mL/min/1 73 square meters)    Stage 3A Moderate CKD (GFR = 45-59 mL/min/1 73 square meters)    Stage 3B Moderate CKD (GFR = 30-44 mL/min/1 73 square meters)    Stage 4 Severe CKD (GFR = 15-29 mL/min/1 73 square meters)    Stage 5 End Stage CKD (GFR <15 mL/min/1 73 square meters)  Note: GFR calculation is accurate only with a steady state creatinine    CBC and differential [[id number]] Collected:  12/14/19 2005    Lab Status:  Final result Specimen:  Blood from Arm, Left Updated:  12/14/19 2018     WBC 9 07 Thousand/uL      RBC 4 83 Million/uL      Hemoglobin 14 8 g/dL      Hematocrit 44 3 %      MCV 92 fL      MCH 30 6 pg      MCHC 33 4 g/dL      RDW 13 3 %      MPV 10 4 fL      Platelets 806 Thousands/uL      nRBC 0 /100 WBCs      Neutrophils Relative 70 %      Immat GRANS % 0 %      Lymphocytes Relative 19 %      Monocytes Relative 10 %      Eosinophils Relative 1 %      Basophils Relative 0 %      Neutrophils Absolute 6 28 Thousands/µL      Immature Grans Absolute 0 03 Thousand/uL      Lymphocytes Absolute 1 73 Thousands/µL      Monocytes Absolute 0 89 Thousand/µL      Eosinophils Absolute 0 12 Thousand/µL      Basophils Absolute 0 02 Thousands/µL                  CTA dissection protocol chest abdomen pelvis w wo contrast   Final Result by Bela Garcia MD (12/14 2300)      1  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 tapering to 4 1 cm at the aortic arch  2   There is crescentic intermediate density along the aortic wall extending from the aortic root through the aortic arch and along the proximal descending thoracic aorta compatible with acute type A intramural hematoma  Emergent vascular consultation    is recommended  I personally discussed this study with Juan Rajan on 12/14/2019 at 10:57 PM                   Workstation performed: [id number]         XR chest 2 views   Final Result by Anupama Valdez MD (12/15 1048)      Cardiomegaly   No acute consolidation or congestion            Workstation performed: [id number]               Procedures  ECG 12 Lead Documentation Only  Date/Time: 12/14/2019 9:12 PM  Performed by: Socorro Mas MD  Authorized by: Socorro Mas MD     Indications / Diagnosis:  Chest pain  ECG reviewed by me, the ED Provider: yes    Patient location:  ED  Previous ECG:     Previous ECG:  Compared to current    Similarity:  Changes noted  Interpretation:     Interpretation: abnormal    Rate:     ECG rate:  65    ECG rate assessment: normal    Rhythm:     Rhythm: sinus rhythm    Ectopy:     Ectopy: PVCs    QRS:     QRS axis:  Normal  Conduction:     Conduction: normal    ST segments:     ST segments:  Normal  T waves:     T waves: inverted      Inverted:  I and aVL  Q waves:     Q waves:   I          ED Course  ED Course as of Dec 15 1551   Sun Dec 15, 2019   8620 Calcium: 9 3         HEART Risk Score      Most Recent Value   History  2 Filed at: 12/14/2019 2037   ECG  1 Filed at: 12/14/2019 2037   Age  2 Filed at: 12/14/2019 2037   Risk Factors  1 Filed at: 12/14/2019 2037   Troponin  0 Filed at: 12/14/2019 2037   Heart Score Risk Calculator   History  2 Filed at: 12/14/2019 2037   ECG  1 Filed at: 12/14/2019 2037   Age  2 Filed at: 12/14/2019 2037   Risk Factors  1 Filed at: 12/14/2019 2037   Troponin  0 Filed at: 12/14/2019 2037   HEART Score  6 Filed at: 12/14/2019 2037   HEART Score  6 Filed at: 12/14/2019 2037        Identification of Seniors at Risk      Most Recent Value   (ISAR) Identification of Seniors at Risk   Before the illness or injury that brought you to the Emergency, did you need someone to help you on a regular basis? 0 Filed at: 12/14/2019 1955   In the last 24 hours, have you needed more help than usual?  0 Filed at: 12/14/2019 1955   Have you been hospitalized for one or more nights during the past 6 months? 0 Filed at: 12/14/2019 1955   In general, do you see well?  0 Filed at: 12/14/2019 1955   In general, do you have serious problems with your memory? 0 Filed at: 12/14/2019 1955   Do you take more than three different medications every day?  0 Filed at: 12/14/2019 1955   ISAR Score  0 Filed at: 12/14/2019 1955                    Wells' Criteria for PE      Most Recent Value   Wells' Criteria for PE   Clinical signs and symptoms of DVT  0 Filed at: 12/14/2019 2042   PE is primary diagnosis or equally likely  0 Filed at: 12/14/2019 2042   HR >100  0 Filed at: 12/14/2019 2042   Immobilization at least 3 days or Surgery in the previous 4 weeks  0 Filed at: 12/14/2019 2042   Previous, objectively diagnosed PE or DVT  0 Filed at: 12/14/2019 2042   Hemoptysis  0 Filed at: 12/14/2019 2042   Malignancy with treatment within 6 months or palliative  0 Filed at: 12/14/2019 2042   Tonny Wong' Criteria Total  0 Filed at: 12/14/2019 2042            MDM  Number of Diagnoses or Management Options  Diagnosis management comments: Patient's presentation was concerning for MI versus aortic dissection  Patient had a Wells criteria of 0  Heart score of 6  EKG showed possible T-wave abnormalities in leads 1 and aVL  Patient was to receive CTA to evaluate for dissection, but given CKD history there was concern regarding receiving CTA  Spoke with Dr Gilford Downs who advised if patient required CTA for r/o dissection to give fluid bolus to support kidneys  Patient was given 1L NS bolus and received scan   Patient was also given 50 mcg of fentanyl for pain, and started on a nitro drip for high blood pressure  Upon re-evaluation, patient reports decrease in pain  CTA reads " crescentic intermediate density along the aortic wall extending from the aortic root through the aortic arch and along the proximal descending thoracic aorta compatible with acute type A intramural hematoma"  Cardiac surgery consulted  They advised HR goal of 60, SBP goal of 100, and to admit to ICU for possible surgical intervention tomorrow  Patient started on esmolol infusion, Cardene drip in the ED, admitted to ICU P4  Disposition  Final diagnoses:   Intramural aortic hematoma ([facility] [address] )     Time reflects when diagnosis was documented in both MDM as applicable and the Disposition within this note     Time User Action Codes Description Comment    12/15/2019  1:02 AM Angel Hannah Add [I71 01] Dissection of thoracic aorta ([facility] [address] )     12/15/2019  1:02 AM Angel Hannah Modify [I71 01] Dissection of thoracic aorta ([facility] [address] )     12/15/2019  9:03 AM Trisha Rojas [[id number]] S/P aorta repair     12/15/2019  3:51 PM Abena, [address] [place] [address] [I71 00] Intramural aortic hematoma [facility])       ED Disposition     ED Disposition Condition Date/Time Comment    Admit Stable Sat Dec 14, 2019 11:50 PM Case was discussed with MICU and the patient's admission status was agreed to be Admission Status: inpatient status to the service of Dr Ag Reed          Follow-up Information    None         Current Discharge Medication List      CONTINUE these medications which have NOT CHANGED    Details   amLODIPine (NORVASC) 10 mg tablet Reported on 11/30/2016      FLUAD 0 5 ML GEORGIE inject 0 5 milliliter intramuscularly  Refills: 0      hydrALAZINE (APRESOLINE) 25 mg tablet Refills: 0      hydrochlorothiazide (HYDRODIURIL) 25 mg tablet TAKE 1 TABLET (25 MG TOTAL) BY MOUTH DAILY IN THE EARLY MORNING      !! losartan (COZAAR) 100 MG tablet take 1 tablet by mouth once daily (STOP LOSARTAN 50MG UNLESS YOUR DOCTOR WANTS OTHERWISE)      ! ! losartan (COZAAR) 50 mg tablet take 1 tablet by mouth once daily      losartan-hydrochlorothiazide (HYZAAR) 100-25 MG per tablet Take 1 tablet by mouth daily for 90 days  Qty: 90 tablet, Refills: 2    Associated Diagnoses: Benign essential hypertension      omeprazole (PriLOSEC) 20 mg delayed release capsule take 1 capsule by mouth daily at bedtime  Qty: 90 capsule, Refills: 2    Associated Diagnoses: Gastroesophageal reflux disease without esophagitis      SHINGRIX 50 MCG SUSR inject 0 5 milliliter intramuscularly  Refills: 0      tamsulosin (FLOMAX) 0 4 mg        !! - Potential duplicate medications found  Please discuss with provider  No discharge procedures on file  ED Provider  Attending physically available and evaluated Oliver Sharpe I managed the patient along with the ED Attending      Electronically Signed by         Jac Croft MD  12/15/19 Joe Cooper MD  12/15/19 4304

## 2019-12-15 NOTE — ANESTHESIA POSTPROCEDURE EVALUATION
Post-Op Assessment Note    CV Status:  Stable    Pain management: adequate     Mental Status:  Unresponsive   Hydration Status:  Stable   PONV Controlled:  None   Airway Patency:  Patent  Airway: intubated   Post Op Vitals Reviewed: Yes      Staff: Anesthesiologist           BP      Temp      Pulse     Resp      SpO2

## 2019-12-15 NOTE — ANESTHESIA PROCEDURE NOTES
Morteza/Thuy  Performed by: Jose Alejandro Do MD  Authorized by: Jose Alejandro Do MD   Date/Time: 12/15/2019 8:57 AM  Consent: Verbal consent obtained  Risks and benefits: risks, benefits and alternatives were discussed  Consent given by: patient  Patient understanding: patient states understanding of the procedure being performed  Patient consent: the patient's understanding of the procedure matches consent given  Procedure consent: procedure consent matches procedure scheduled  Relevant documents: relevant documents present and verified  Required items: required blood products, implants, devices, and special equipment available  Patient identity confirmed: arm band  Time out: Immediately prior to procedure a "time out" was called to verify the correct patient, procedure, equipment, support staff and site/side marked as required  Indications: vascular access and central pressure monitoring  Location details: right internal jugular  Catheter size: 9 Fr  Patient position: Trendelenburg  Assessment: blood return through all ports and free fluid flow  Preparation: Chloraprep and skin prepped with 2% chlorhexidine  Skin prep agent dried: skin prep agent completely dried prior to procedure  Sterile barriers: all five maximum sterile barriers used - cap, mask, sterile gown, sterile gloves, and large sterile sheet  Hand hygiene: hand hygiene performed prior to central venous catheter insertion  Ultrasound guidance: yes  ultrasound permanent image saved  Reason All Sterile Barriers Not Used:   All elements of maximal sterile barrier technique not followed for medical reasons  Pre-procedure: landmarks identified  Number of attempts: 1  Successful placement: yes  Post-procedure: line sutured and dressing applied  Patient tolerance: Patient tolerated the procedure well with no immediate complications  Comments: Procedure start 0847  Procedure end 0857

## 2019-12-15 NOTE — CONSULTS
Consultation - Cardiothoracic Surgery   [facility] Core 70 y o  male MRN: [id number]  Unit/Bed#: OR POOL Encounter: [id number]    Physician Requesting Consult: Kevin Jarquin DO    Reason for Consult / Principal Problem:  Ascending aorta dissection    Inpatient consult to Cardiothoracic Surgery  Consult performed by: Ivanna Cook PA-C  Consult ordered by: Alma Duff PA-C        History of Present Illness: Corrinne Chum is a 70y o  year old male 65-year-old gentleman with longstanding history of hypertension and hyperlipidemia  He developed acute substernal chest pressure at approximately 6:30 a m  Last night  He presented for evaluation and treatment in the emergency department  CT was performed and ascending aortic dissection with intramural hematoma was identified  Cardiovascular surgery consultation was obtained urgently for intervention  Patient was admitted to service critical care and stabilized medically  Preoperative workup and consultation was completed        Past Medical History:  Past Medical History:   Diagnosis Date    Arthritis     BPH without urinary obstruction     CKD (chronic kidney disease), stage III (HCC)     baseline 1 6-1 7    GERD (gastroesophageal reflux disease)     Hyperlipidemia     Hypertension        Past Surgical History:   Past Surgical History:   Procedure Laterality Date    APPENDECTOMY      COLONOSCOPY  2017    FRACTURE SURGERY      NE RECONSTR TOTAL SHOULDER IMPLANT Right 12/5/2017    Procedure: ARTHROPLASTY SHOULDER REVERSE with OPEN CYST EXCISION;  Surgeon: Audi Bojorquez MD;  [facility];  Service: Orthopedics    SHOULDER SURGERY      WRIST SURGERY         Family History:  Family History   Problem Relation Age of Onset    No Known Problems Mother     Hypertension Father     Arthritis Family        Social History:  Social History     Substance and Sexual Activity   Alcohol Use Not Currently     Social History     Substance and Sexual Activity   Drug Use Not Currently     Social History     Tobacco Use   Smoking Status Former Smoker    Packs/day: 1 00    Last attempt to quit: Aide [place] Years since quittin 9   Smokeless Tobacco Never Used     Marital Status: /Civil Union    Home Medications:   Prior to Admission medications    Medication Sig Start Date End Date Taking?  Authorizing Provider   amLODIPine (NORVASC) 10 mg tablet Reported on 2016  Yes Historical Provider, MD   FLUAD 0 5 ML GEORGIE inject 0 5 milliliter intramuscularly 9/15/18   Historical Provider, MD   hydrALAZINE (APRESOLINE) 25 mg tablet  19   Historical Provider, MD   hydrALAZINE (APRESOLINE) 50 mg tablet Take 1 tablet (50 mg total) by mouth every 8 (eight) hours for 30 days 19  Evon Mcclain MD   hydrochlorothiazide (HYDRODIURIL) 25 mg tablet TAKE 1 TABLET (25 MG TOTAL) BY MOUTH DAILY IN THE EARLY MORNING 18   Historical Provider, MD   losartan (COZAAR) 100 MG tablet take 1 tablet by mouth once daily (STOP LOSARTAN 50MG UNLESS YOUR DOCTOR WANTS OTHERWISE) 18   Historical Provider, MD   losartan (COZAAR) 50 mg tablet take 1 tablet by mouth once daily 16   Historical Provider, MD   losartan-hydrochlorothiazide (HYZAAR) 100-25 MG per tablet Take 1 tablet by mouth daily for 90 days 19  Zaria Garcia MD   omeprazole (PriLOSEC) 20 mg delayed release capsule take 1 capsule by mouth daily at bedtime  Patient not taking: Reported on 2019   Alo Kong MD   [facility] 50 MCG SUSR inject 0 5 milliliter intramuscularly 9/15/18   Historical Provider, MD   tamsulosin (FLOMAX) 0 4 mg  3/2/17   Historical Provider, MD       Inpatient Medications:  Scheduled Meds:   Current Facility-Administered Medications:  acetaminophen 650 mg Rectal Q4H PRN Jerri Watts PA-C   acetaminophen 650 mg Oral Q4H PRN Jerri Watts PA-C   amiodarone 200 mg Oral [facility] Jerri Watts PA-C   aspirin 325 mg Oral Daily Maia Lu Rosalia, TRINY   atorvastatin 80 mg Oral Daily [employer] Rosalia, PA-C   bisacodyl 10 mg Rectal Daily PRN Simmie Aas, PA-C   calcium chloride 1 g Intravenous Once Simmie Aas, PA-C   cefazolin 2,000 mg Intravenous Q8H Simmie Aas, PA-C   chlorhexidine 15 mL Swish & Spit BID Simmie Aas, PA-C   fentanyl citrate (PF) 50 mcg Intravenous Once Simmie Aas, PA-C   fentanyl citrate (PF) 50 mcg Intravenous Q1H PRN Simmie Aas, PA-C   [START ON 12/16/2019] fondaparinux 2 5 mg Subcutaneous Daily Simmie Aas, PA-C   furosemide 40 mg Intravenous Q6H PRN Simmie Aas, PA-C   HYDROmorphone 1 mg Intravenous Q1H PRN Simmie Aas, PA-C   insulin regular (HumuLIN R,NovoLIN R) infusion 0 3-21 Units/hr Intravenous Titrated Simmie Aas, PA-C   lactated ringers 500 mL Intravenous Q30 Min PRN Simmie Aas, PA-C   lidocaine (cardiac) 100 mg Intravenous Q30 Min PRN Simmie Aas, PA-C   magnesium sulfate 2 g Intravenous Once Simmie Aas, PA-C   mupirocin 1 application Nasal [address] [facility] Simmie Aas, PA-C   niCARdipine 2 5-15 mg/hr Intravenous Titrated Simmie Aas, PA-C   ondansetron 4 mg Intravenous Q6H PRN Simmie Aas, PA-C   oxyCODONE-acetaminophen 1 tablet Oral Q4H PRN Simmie Aas, PA-C   oxyCODONE-acetaminophen 2 tablet Oral Q6H PRN Simmie Aas, PA-C   pantoprazole 40 mg Oral Daily Simmie Aas, PA-C   polyethylene glycol 17 g Oral Daily Simmie Aas, PA-C   potassium chloride 20 mEq Intravenous Once PRN Simmie Aas, PA-C   potassium chloride 20 mEq Intravenous Q1H PRN Simmie Aas, PA-C   potassium chloride 20 mEq Intravenous Q30 Min PRN Simmie Aas, PA-C   sodium chloride 20 mL/hr Intravenous Continuous Simmie Aas, PA-C     Continuous Infusions:   insulin regular (HumuLIN R,NovoLIN R) infusion 0 3-21 Units/hr   niCARdipine 2 5-15 mg/hr   sodium chloride 20 mL/hr     PRN Meds:    acetaminophen 650 mg Q4H PRN   acetaminophen 650 mg Q4H PRN   bisacodyl 10 mg Daily PRN fentanyl citrate (PF) 50 mcg Q1H PRN   furosemide 40 mg Q6H PRN   HYDROmorphone 1 mg Q1H PRN   lactated ringers 500 mL Q30 Min PRN   lidocaine (cardiac) 100 mg Q30 Min PRN   ondansetron 4 mg Q6H PRN   oxyCODONE-acetaminophen 1 tablet Q4H PRN   oxyCODONE-acetaminophen 2 tablet Q6H PRN   potassium chloride 20 mEq Once PRN   potassium chloride 20 mEq Q1H PRN   potassium chloride 20 mEq Q30 Min PRN       Allergies:  No Known Allergies    Review of Systems:  Review of Systems   Constitutional: Positive for fatigue  HENT: Negative  Eyes: Negative  Respiratory: Positive for chest tightness and shortness of breath  Cardiovascular: Positive for chest pain and leg swelling  Endocrine: Negative  Genitourinary: Negative  Musculoskeletal: Negative  Skin: Negative  Neurological: Negative  Psychiatric/Behavioral: Negative          Vital Signs:     Vitals:    12/15/19 0500 12/15/19 0600 12/15/19 0700 12/15/19 0800   BP: 110/69 110/58 104/67 96/57   BP Location:       Pulse: 76 76 68 70   Resp: 17 19 18 19   Temp:    98 4 °F (36 9 °C)   TempSrc:    Oral   SpO2: 94% 93% 92% 96%   Weight:       Height:         Invasive Devices     Central Venous Catheter Line            CVC Central Lines 12/15/19 Triple less than 1 day    Introducer 12/15/19 less than 1 day          Peripheral Intravenous Line            Peripheral IV 12/14/19 Left Antecubital less than 1 day    Peripheral IV 12/14/19 Right Antecubital less than 1 day    Peripheral IV 12/15/19 Right Antecubital less than 1 day    Peripheral IV 12/15/19 Right Wrist less than 1 day          Arterial Line            Arterial Line 12/15/19 Radial less than 1 day    Arterial Line 12/15/19 Right Radial less than 1 day          Line            Pacer Wires less than 1 day    Pacer Wires less than 1 day          Drain            Chest Tube 1 Left Mediastinal 24 Fr  less than 1 day    Chest Tube 2 Anterior Mediastinal 32 Fr  less than 1 day    Chest Tube 3 Posterior Mediastinal 32 Fr  less than 1 day    NG/OG/Enteral Tube Orogastric 18 Fr Center mouth less than 1 day    Urethral Catheter Non-latex; Temperature probe less than 1 day          Airway            ETT  Hi-Lo; Cuffed;Oral 8 mm less than 1 day                Physical Exam:  Physical Exam   Constitutional: He is oriented to person, place, and time  Vital signs are normal  He appears well-developed  HENT:   Head: Normocephalic and atraumatic  Eyes: Pupils are equal, round, and reactive to light  Conjunctivae are normal    Neck: Normal range of motion and full passive range of motion without pain  No JVD present  Carotid bruit is not present  No tracheal deviation present  No thyromegaly present  Cardiovascular: Normal rate, regular rhythm, S1 normal and S2 normal    Pulses:       Carotid pulses are 2+ on the right side, and 2+ on the left side  Dorsalis pedis pulses are 2+ on the right side, and 2+ on the left side  Posterior tibial pulses are 2+ on the right side, and 2+ on the left side  Pulmonary/Chest: No accessory muscle usage  No respiratory distress  He has no wheezes  He has no rales  He exhibits no tenderness  Abdominal: Soft  Normal appearance and bowel sounds are normal  There is no tenderness  There is no CVA tenderness  Musculoskeletal: Normal range of motion  Neurological: He is alert and oriented to person, place, and time  He has normal strength  No cranial nerve deficit or sensory deficit  Skin: Skin is warm and dry  Psychiatric: He has a normal mood and affect   His speech is normal and behavior is normal        Lab Results:     Results from last 7 days   Lab Units 12/15/19  1458 12/15/19  1412 12/15/19  1354 12/15/19  1342  12/15/19  0354 12/14/19 2005   WBC Thousand/uL  --   --   --   --   --  11 24* 9 07   HEMOGLOBIN g/dL  --   --   --   --   --  12 7 14 8   I STAT HEMOGLOBIN g/dl 10 5* 10 2*  --  9 9*   < >  --   --    HEMATOCRIT %  --   --   --   --   --  38 0 44 3 HEMATOCRIT, ISTAT % 31* 30*  --  29*   < >  --   --    PLATELETS Thousands/uL  --   --  77*  --   --  157 169    < > = values in this interval not displayed  Results from last 7 days   Lab Units 12/15/19  1458 12/15/19  1412 12/15/19  1342  12/15/19  0354 12/14/19 2005   POTASSIUM mmol/L  --   --   --   --  3 8 4 5   CHLORIDE mmol/L  --   --   --   --  116* 110*   CO2 mmol/L  --   --   --   --  24 26   CO2, I-STAT mmol/L 24 26 26   < >  --   --    BUN mg/dL  --   --   --   --  32* 31*   CREATININE mg/dL  --   --   --   --  1 70* 1 86*   GLUCOSE, ISTAT mg/dl 104 131 155*   < >  --   --    CALCIUM mg/dL  --   --   --   --  8 4 9 3    < > = values in this interval not displayed  Results from last 7 days   Lab Units 12/15/19  0354   INR  1 27*     Lab Results   Component Value Date    HGBA1C 5 3 11/17/2017     Lab Results   Component Value Date    TROPONINI <0 02 12/14/2019       Imaging Studies:     CTA Chest: 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 tapering to 4 1 cm at the aortic arch  There is crescentic intermediate density along the aortic wall extending from the aortic root through the aortic arch and along the proximal descending thoracic aorta compatible with acute type A intramural hematoma  Emergent vascular consultation   is recommended  I have personally reviewed pertinent reports  and I have personally reviewed pertinent films in PACS    Assessment:  Principal Problem:    Dissection of thoracic aorta (HCC)  Active Problems:    Benign essential hypertension    Abnormal TSH    Benign hypertension with CKD (chronic kidney disease) stage III (HCC)    Benign prostatic hyperplasia without lower urinary tract symptoms    S/P aorta repair    Ascending aortic aneurysm; Ongoing ascending aortic replacement workup    Plan:  Risks and benefits of ascending aorta/hemiarch were discussed in detail today with the patient    They understand and wish to proceed with further workup and ultimately surgical intervention  We have ordered routine preoperative laboratory and vascular studies  Pending the results of these tests, they will be scheduled for surgery today with CHIDI Rivas was comfortable with our recommendations, and their questions were answered to their satisfaction  We will continue to evaluate the patient daily with further recommendations as work up is completed  Thank you for allowing us to participate in the care of this patient       SIGNATURE: Keyon De La Garza PA-C  DATE: December 15, 2019  TIME: 3:59 PM

## 2019-12-15 NOTE — PROCEDURES
Insert arterial line  Date/Time: 12/15/2019 12:58 AM  Performed by: Dayna Funes PA-C  Authorized by: Dayna Funes PA-C     Patient location:  Bedside  Consent:     Consent obtained:  Verbal  Universal protocol:     Patient identity confirmed:  Verbally with patient and hospital-assigned identification number  Indications:     Indications: hemodynamic monitoring    Pre-procedure details:     Skin preparation:  Chlorhexidine  Procedure details:     Location / Tip of Catheter:  Radial    Laterality:  Left    Number of attempts:  1    Successful placement: yes      Transducer: waveform confirmed    Post-procedure details:     Post-procedure:  Biopatch applied, sutured and wrist guard applied    Patient tolerance of procedure:   Tolerated well, no immediate complications

## 2019-12-15 NOTE — ED ATTENDING ATTESTATION
12/14/2019  I, Irena Yun MD, saw and evaluated the patient  I have discussed the patient with the resident/non-physician practitioner and agree with the resident's/non-physician practitioner's findings, Plan of Care, and MDM as documented in the resident's/non-physician practitioner's note, except where noted  All available labs and Radiology studies were reviewed  I was present for key portions of any procedure(s) performed by the resident/non-physician practitioner and I was immediately available to provide assistance  At this point I agree with the current assessment done in the Emergency Department  I have conducted an independent evaluation of this patient a history and physical is as follows: Pt presents with chest pain 2 hours pta while at [facility]  Pain was sharp sudden Pt sweaty with dry heaves and some associated back pain   Pain is worse with deep breath no radiation to arms PE: alert nad heart reg lungs clear abd soft nontender ext nad MDM: will do ct for dissection treat nitro cardiac vasquez  ED Course         Critical Care Time  Procedures

## 2019-12-15 NOTE — ANESTHESIA PREPROCEDURE EVALUATION
Review of Systems/Medical History          Cardiovascular  Hyperlipidemia, Hypertension , Aortic disease,    Pulmonary  Shortness of breath,        GI/Hepatic    GERD ,        Chronic kidney disease stage 3,        Endo/Other     GYN       Hematology   Musculoskeletal    Arthritis     Neurology   Psychology           Physical Exam    Airway    Mallampati score: II         Dental   No notable dental hx     Cardiovascular      Pulmonary      Other Findings        Anesthesia Plan  ASA Score- 5 Emergent    Anesthesia Type- general with ASA Monitors  Additional Monitors: arterial line, central venous line and pulmonary artery catheter  Airway Plan: ETT  Comment: I, Dr Geronimo Tello, the attending physician, have personally seen and evaluated the patient prior to anesthetic care  I have reviewed the pre-anesthetic record, and other medical records if appropriate to the anesthetic care  If a CRNA is involved in the case, I have reviewed the CRNA assessment, if present, and agree  The patient is in a suitable condition to proceed with my formulated anesthetic plan        Plan Factors-    Induction- intravenous  Postoperative Plan-     Informed Consent- Anesthetic plan and risks discussed with patient  I personally reviewed this patient with the CRNA  Discussed and agreed on the Anesthesia Plan with the CRNA  Stephania Price

## 2019-12-15 NOTE — H&P
H&P Exam - [address] Sharpe 70 y o  male MRN: [id number]  Unit/Bed#: ED 13 Encounter: [id number]      -------------------------------------------------------------------------------------------------------------  Chief Complaint: "I started with chest pain this evening all of a sudden"    History of Present Illness     Oliver Sharpe is a 79-year-old male with a past medical history of hypertension and BPH who presents with acute onset of chest pain that started around 6:30 p m  [facility] Patient states that the pain came on suddenly in the middle of his chest and radiated to his central back  He also felt diaphoretic with an episode of vomiting  At baseline the patient states he is moderately active without exertional symptoms  His daughter does though illicit recent onset of shortness of breath over the last few days  Currently, the patient is resting comfortably in bed without chest pain  He appears in no apparent distress or discomfort  History obtained from chart review and the patient   -------------------------------------------------------------------------------------------------------------  Assessment and Plan:    Neuro:    Plan: Fentanyl PRN for pain management  Q1 hour neuro checks  CV:    Diagnosis: Acute type A aortic dissection  HTN  o Plan: Esmolol and Cardene infusions for HR <60 and SBP <110  Prep for OR in AM with cardiac surgery  Arterial line placement  Q 1 hour neurovascular checks  Plan for intra-op DYLAN  Pulm:   None      GI:    None      :    Diagnosis: CKD with baseline creat 1 7-1 8  BPH  o Plan: Trend UOP and creat         F/E/N:    Plan: NPO      Heme/Onc:    None      Endo:    None      ID:    None      MSK/Skin:    None    Disposition: Continue Critical Care   Code Status: Prior  --------------------------------------------------------------------------------------------------------------  Review of Systems   Respiratory: Negative for chest tightness and shortness of breath  Cardiovascular: Negative for chest pain  All other systems reviewed and are negative  Physical Exam   Constitutional: He is oriented to person, place, and time  He appears well-developed and well-nourished  He is cooperative  Non-toxic appearance  Cardiovascular: Normal rate, regular rhythm, S1 normal and S2 normal  Exam reveals no gallop and no friction rub  No murmur heard  Pulmonary/Chest: Effort normal and breath sounds normal  He has no wheezes  He has no rhonchi  He has no rales  Abdominal: Soft  Normal appearance  Neurological: He is alert and oriented to person, place, and time  He has normal strength  Skin: Skin is warm and dry  No rash noted  He is not diaphoretic  Psychiatric: He has a normal mood and affect   His speech is normal and behavior is normal      --------------------------------------------------------------------------------------------------------------  Historical Information   Past Medical History:   Diagnosis Date    Arthritis     BPH without urinary obstruction     CKD (chronic kidney disease), stage III (HCC)     baseline 1 6-1 7    GERD (gastroesophageal reflux disease)     Hyperlipidemia     Hypertension      Past Surgical History:   Procedure Laterality Date    APPENDECTOMY      COLONOSCOPY  2017    FRACTURE SURGERY      AK RECONSTR TOTAL SHOULDER IMPLANT Right 2017    Procedure: ARTHROPLASTY SHOULDER REVERSE with OPEN CYST EXCISION;  Surgeon: Meenu Christine MD;  [facility];  Service: Orthopedics    SHOULDER SURGERY      WRIST SURGERY       Social History   Social History     Substance and Sexual Activity   Alcohol Use Not Currently     Social History     Substance and Sexual Activity   Drug Use Not Currently     Social History     Tobacco Use   Smoking Status Former Smoker    Packs/day: 1 00    Last attempt to quit: Kristin Keyes Years since quittin 9   Smokeless Tobacco Never Used     Family History: Family History   Problem Relation Age of Onset    No Known Problems Mother     Hypertension Father     Arthritis Family      I have reviewed this patient's family history and commented on sigificant items within the HPI    Vitals:   Vitals:    12/14/19 2333 12/14/19 2349 12/14/19 2353 12/15/19 0005   BP: 153/81 153/73 148/74 141/65   BP Location: Right arm Right arm Right arm Right arm   Pulse: 80 86 84 84   Resp: 18 20 20 20   Temp:       TempSrc:       SpO2: 96% 96% 93% 95%   Weight:         Temp  Min: 97 9 °F (36 6 °C)  Max: 97 9 °F (36 6 °C)  IBW: -88 kg     Body mass index is 26 61 kg/m²  Medications:  Current Facility-Administered Medications   Medication Dose Route Frequency    esmolol (BREVIBLOC) 2500 mg/250 mL IV infusion (premix)   mcg/kg/min Intravenous Titrated    niCARdipine (CARDENE) 25 mg (STANDARD CONCENTRATION) in sodium chloride 0 9% 250 mL  1-15 mg/hr Intravenous Titrated     Home medications:  Prior to Admission Medications   Prescriptions Last Dose Informant Patient Reported? Taking?    FLUAD 0 5 ML GEORGIE   Yes No   Sig: inject 0 5 milliliter intramuscularly   SHINGRIX 50 MCG SUSR   Yes No   Sig: inject 0 5 milliliter intramuscularly   amLODIPine (NORVASC) 10 mg tablet   Yes Yes   Sig: Reported on 11/30/2016   hydrALAZINE (APRESOLINE) 25 mg tablet   Yes No   hydrALAZINE (APRESOLINE) 50 mg tablet   No No   Sig: Take 1 tablet (50 mg total) by mouth every 8 (eight) hours for 30 days   hydrochlorothiazide (HYDRODIURIL) 25 mg tablet   Yes No   Sig: TAKE 1 TABLET (25 MG TOTAL) BY MOUTH DAILY IN THE EARLY MORNING   losartan (COZAAR) 100 MG tablet   Yes No   Sig: take 1 tablet by mouth once daily (STOP LOSARTAN 50MG UNLESS YOUR DOCTOR WANTS OTHERWISE)   losartan (COZAAR) 50 mg tablet   Yes No   Sig: take 1 tablet by mouth once daily   losartan-hydrochlorothiazide (HYZAAR) 100-25 MG per tablet   No No   Sig: Take 1 tablet by mouth daily for 90 days   omeprazole (PriLOSEC) 20 mg delayed release capsule  Self No No   Sig: take 1 capsule by mouth daily at bedtime   Patient not taking: Reported on 4/5/2019   tamsulosin (FLOMAX) 0 4 mg   Yes No      Facility-Administered Medications: None     Allergies:  No Known Allergies    Laboratory and Diagnostics:  Results from last 7 days   Lab Units 12/14/19 2005   WBC Thousand/uL 9 07   HEMOGLOBIN g/dL 14 8   HEMATOCRIT % 44 3   PLATELETS Thousands/uL 169   NEUTROS PCT % 70   MONOS PCT % 10     Results from last 7 days   Lab Units 12/14/19 2005   SODIUM mmol/L 141   POTASSIUM mmol/L 4 5   CHLORIDE mmol/L 110*   CO2 mmol/L 26   ANION GAP mmol/L 5   BUN mg/dL 31*   CREATININE mg/dL 1 86*   CALCIUM mg/dL 9 3   GLUCOSE RANDOM mg/dL 162*   ALT U/L 29   AST U/L 39   ALK PHOS U/L 54   ALBUMIN g/dL 3 1*   TOTAL BILIRUBIN mg/dL 0 53               Results from last 7 days   Lab Units 12/14/19  2322 12/14/19 2005   TROPONIN I ng/mL <0 02 0 03     12/14 CTA chest: 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 tapering to 4 1 cm at the aortic arch  There is crescentic intermediate density along the aortic wall extending from the aortic root through the aortic arch and along the proximal descending thoracic aorta compatible with acute type A intramural hematoma  ------------------------------------------------------------------------------------------------------------  Anticipated Length of Stay is > 2 midnights    Counseling / Coordination of Care  Total Critical Care time spent 0 minutes excluding procedures, teaching and family updates  Jenelle Pereyra PA-C    Portions of the record may have been created with voice recognition software  Occasional wrong word or "sound a like" substitutions may have occurred due to the inherent limitations of voice recognition software    Read the chart carefully and recognize, using context, where substitutions have occurred

## 2019-12-15 NOTE — ED NOTES
Patient transported to [id number], [facility]  12/14/19 2655

## 2019-12-15 NOTE — UTILIZATION REVIEW
Notification of Inpatient Admission/Inpatient Authorization Request   This is a Notification of Inpatient Admission for [address] [facility]  Be advised that this patient was admitted to our facility under Inpatient Status  Contact Brittni Alston at [phone] for additional admission information  Isma Dennison  DEPT  DEDICATED [phone]  Patient Name:   Sharmila Nguyễn   YOB: 1940       [address]   [address]:   [facility]  Tax ID: [id number]  NPI: [id number] Attending Provider/NPI: Sanjay Ley [[id number]]   Place of Service Code: 24     Place of Service Name:  [address] [place] [address]   Start Date: 12/14/19 2352     Discharge Date & Time: No discharge date for patient encounter  Type of Admission: Inpatient Status Discharge Disposition (if discharged): Home/Self Care   Patient Diagnoses: Chest pain [R07 9]     Orders: Admission Orders (From admission, onward)     Ordered        12/14/19 2354  Inpatient Admission  Once         12/14/19 2354  Inpatient Admission  Once                    Assigned Utilization Review Contact: Brittni Alston  Utilization   Network Utilization Review Department  Phone: [phone]; Fax [phone]  Email: [email or web address]   ATTENTION PAYERS: Please call the assigned Utilization  directly with any questions or concerns ALL voicemails in the department are confidential  Send all requests for admission clinical reviews, approved or denied determinations and any other requests to dedicated fax number belonging to the campus where the patient is receiving treatment

## 2019-12-15 NOTE — ANESTHESIA PROCEDURE NOTES
Procedure Performed: DYLAN Anesthesia  Start Time:  12/15/2019 9:29 AM        Preanesthesia Checklist    Patient identified, IV assessed, risks and benefits discussed, monitors and equipment assessed, procedure being performed at surgeon's request and anesthesia consent obtained  Procedure    Diagnostic Indications for DYLAN:  assessment of ascending aorta, assessment of surgical repair and hemodynamic monitoring  Type of DYLAN: complete DYLAN with interpretation  Images Saved: ultrasound permanent image saved  Physician Requesting Echo: Brittney Hurst DO  Location performed: OR  Intubated  Bite block not placed  Heart visualized  Insertion of DYLAN Probe:  Atraumatic  Probe Type:  Multiplane  Modalities:  3D, color flow mapping, continuous wave Doppler and pulse wave Doppler  Echocardiographic and Doppler Measurements    PREPROCEDURE    LEFT VENTRICLE:  Systolic Function: normal  Ejection Fraction: 65%  Cavity size: normal      RIGHT VENTRICLE:  Systolic Function: normal   Cavity size moderately dilated  Hypertrophy (severe LVH) present  AORTIC VALVE:  Leaflets: normal and trileaflet  Leaflet motions normal and normal  Stenosis: none  Regurgitation: trace  MITRAL VALVE:  Leaflets: calcified and normal  Leaflet Motions: normal  Regurgitation: none  Stenosis: none  TRICUSPID VALVE:  Leaflets: normal, annulus severely dilated and dilate annulus  Leaflet Motions: restricted  Stenosis: none  Regurgitation: moderate  PULMONIC VALVE:  Leaflets: normal  Regurgitation: none  Stenosis: none  ASCENDING AORTA:  Size:  normal  Dissection present  AORTIC ARCH:  Size:  normal          OTHER AORTIC FINDINGS:   Hematoma noted that extends from distal arch to mid descending aorta, 0 7 cm in thickness  RIGHT ATRIUM:  Size:  dilated   No spontaneous echo contrast     LEFT ATRIUM:  Size: normal      LEFT ATRIAL APPENDAGE:  Size: normal          ATRIAL SEPTUM:  Intra-atrial septal morphology: normal          VENTRICULAR SEPTUM:  Intra-ventricular septum morphology: normal              OTHER FINDINGS:  Pericardium:  normal  Pleural Effusion:  none  POSTPROCEDURE    LEFT VENTRICLE: Unchanged   RIGHT VENTRICLE:   Systolic Function: mildly depressed  AORTIC VALVE:   Leaflets: native  Stenosis: none  Regurgitation: mild  MITRAL VALVE: Unchanged   TRICUSPID VALVE:   Leaflets: native  Regurgitation: systolic flow reversal in hepatic vein and severe  PULMONIC VALVE: Unchanged           ATRIA: Unchanged   AORTA:     OTHER AORTA FINDINGS: The proximal aorta has been replaced  The mural hematoma on the descending aorta is preserved  Marleta Press REMOVAL:  Probe Removal: atraumatic  ECHOCARDIOGRAM COMMENTS:  Noted is the absence of right coronary ostia  The left coronary ostia appears to have two vessels arising from it  Confirmed surgically  Marleta Press

## 2019-12-15 NOTE — ANESTHESIA PROCEDURE NOTES
Central Line Insertion  Performed by: Alexey Hamilton MD  Authorized by: Alexey Hamilton MD   Date/Time: 12/15/2019 8:58 AM  Catheter Type:  triple lumen  Consent: The procedure was performed in an emergent situation  Verbal consent obtained  Risks and benefits: risks, benefits and alternatives were discussed  Consent given by: patient  Patient understanding: patient states understanding of the procedure being performed  Patient consent: the patient's understanding of the procedure matches consent given  Procedure consent: procedure consent matches procedure scheduled  Relevant documents: relevant documents present and verified  Required items: required blood products, implants, devices, and special equipment available  Patient identity confirmed: arm band  Time out: Immediately prior to procedure a "time out" was called to verify the correct patient, procedure, equipment, support staff and site/side marked as required  Indications: vascular access  Location details: right internal jugular  Catheter size: 7 Fr  Patient position: Trendelenburg  Assessment: blood return through all ports and free fluid flow  Preparation: Chloraprep and skin prepped with 2% chlorhexidine  Skin prep agent dried: skin prep agent completely dried prior to procedure  Sterile barriers: all five maximum sterile barriers used - cap, mask, sterile gown, sterile gloves, and large sterile sheet  Hand hygiene: hand hygiene performed prior to central venous catheter insertion  Ultrasound guidance: yes  sterile gel and probe cover used in ultrasound-guided central venous catheter insertionultrasound permanent image saved  Reason All Sterile Barriers Not Used:   All elements of maximal sterile barrier technique not followed for medical reasons  Pre-procedure: landmarks identified  Number of attempts: 1  Successful placement: yes  Post-procedure: dressing applied,  chlorhexidine patch applied and line sutured  Patient tolerance: Patient tolerated the procedure well with no immediate complications  Comments: Procedure start 0848  Procedure end 0719

## 2019-12-15 NOTE — RESPIRATORY THERAPY NOTE
RT Protocol Note  Mary Pereira A Core 70 y o  male MRN: [id number]  Unit/Bed#: [facility] 413-01 Encounter: [id number]    Assessment    Principal Problem:    Dissection of thoracic aorta ([address] )  Active Problems:    Benign essential hypertension    Benign hypertension with CKD (chronic kidney disease) stage III (HCC)      Home Pulmonary Medications:  none       Past Medical History:   Diagnosis Date    Arthritis     BPH without urinary obstruction     CKD (chronic kidney disease), stage III (HCC)     baseline 1 6-1 7    GERD (gastroesophageal reflux disease)     Hyperlipidemia     Hypertension      Social History     Socioeconomic History    Marital status: /Civil Union     Spouse name: None    Number of children: None    Years of education: None    Highest education level: None   Occupational History    None   Social Needs    Financial resource strain: None    Food insecurity:     Worry: None     Inability: None    Transportation needs:     Medical: None     Non-medical: None   Tobacco Use    Smoking status: Former Smoker     Packs/day: 1 00     Last attempt to quit:      Years since quittin 9    Smokeless tobacco: Never Used   Substance and Sexual Activity    Alcohol use: Not Currently    Drug use: Not Currently    Sexual activity: None   Lifestyle    Physical activity:     Days per week: None     Minutes per session: None    Stress: None   Relationships    Social connections:     Talks on phone: None     Gets together: None     Attends [employer] service: None     Active member of club or organization: None     Attends meetings of clubs or organizations: None     Relationship status: None    Intimate partner violence:     Fear of current or ex partner: None     Emotionally abused: None     Physically abused: None     Forced sexual activity: None   Other Topics Concern    None   Social History Narrative    Daily caffeine consumption, 1 serving a day    Drinks coffee Subjective    Subjective Data: pt currently in no resp distress  BS diminished/clear, SpO2 96% on room air  No resp UDN indicated at this time  Will follow pt post cardiac surgery    Objective    Physical Exam:   Assessment Type: Assess only  General Appearance: Alert, Awake  Respiratory Pattern: Normal  Chest Assessment: Chest expansion symmetrical  Bilateral Breath Sounds: Clear, Diminished  O2 Device: room air    Vitals:  Blood pressure 116/75, pulse 84, temperature 98 3 °F (36 8 °C), temperature source Oral, resp  rate (!) 27, height 5' 9" (1 753 m), weight 81 6 kg (179 lb 14 3 oz), SpO2 97 %  Imaging and other studies: I have personally reviewed pertinent reports  O2 Device: room air     Plan: no resp UDN's indicated  Follow s/p cardiac surgery             Resp Comments: pt assessed per resp protocol  pt presents with dissection of thoracic aorta  Plan for pt to have cardiac surgery   Pt has no resp PMH, takes no home resp meds, + smoking hx

## 2019-12-15 NOTE — PLAN OF CARE
Problem: Potential for Falls  Goal: Patient will remain free of falls  Description  INTERVENTIONS:  - Assess patient frequently for physical needs  -  Identify cognitive and physical deficits and behaviors that affect risk of falls    -  [place] fall precautions as indicated by assessment   - Educate patient/family on patient safety including physical limitations  - Instruct patient to call for assistance with activity based on assessment  - Modify environment to reduce risk of injury  - Consider OT/PT consult to assist with strengthening/mobility  Outcome: Progressing     Problem: PAIN - ADULT  Goal: Verbalizes/displays adequate comfort level or baseline comfort level  Description  Interventions:  - Encourage patient to monitor pain and request assistance  - Assess pain using appropriate pain scale  - Administer analgesics based on type and severity of pain and evaluate response  - Implement non-pharmacological measures as appropriate and evaluate response  - Consider cultural and social influences on pain and pain management  - Notify physician/advanced practitioner if interventions unsuccessful or patient reports new pain  Outcome: Progressing     Problem: INFECTION - ADULT  Goal: Absence or prevention of progression during hospitalization  Description  INTERVENTIONS:  - Assess and monitor for signs and symptoms of infection  - Monitor lab/diagnostic results  - Monitor all insertion sites, i e  indwelling lines, tubes, and drains  - Monitor endotracheal if appropriate and nasal secretions for changes in amount and color  - [place] appropriate cooling/warming therapies per order  - Administer medications as ordered  - Instruct and encourage patient and family to use good hand hygiene technique  - Identify and instruct in appropriate isolation precautions for identified infection/condition  Outcome: Progressing     Problem: SAFETY ADULT  Goal: Maintain or return to baseline ADL function  Description  INTERVENTIONS:  -  Assess patient's ability to carry out ADLs; assess patient's baseline for ADL function and identify physical deficits which impact ability to perform ADLs (bathing, care of mouth/teeth, toileting, grooming, dressing, etc )  - Assess/evaluate cause of self-care deficits   - Assess range of motion  - Assess patient's mobility; develop plan if impaired  - Assess patient's need for assistive devices and provide as appropriate  - Encourage maximum independence but intervene and supervise when necessary  - Involve family in performance of ADLs  - Assess for home care needs following discharge   - Consider OT consult to assist with ADL evaluation and planning for discharge  - Provide patient education as appropriate  Outcome: Progressing  Goal: Maintain or return mobility status to optimal level  Description  INTERVENTIONS:  - Assess patient's baseline mobility status (ambulation, transfers, stairs, etc )    - Identify cognitive and physical deficits and behaviors that affect mobility  - Identify mobility aids required to assist with transfers and/or ambulation (gait belt, sit-to-stand, lift, walker, cane, etc )  - [place] fall precautions as indicated by assessment  - Record patient progress and toleration of activity level on Mobility SBAR; progress patient to next Phase/Stage  - Instruct patient to call for assistance with activity based on assessment  - Consider rehabilitation consult to assist with strengthening/weightbearing, etc   Outcome: Progressing     Problem: DISCHARGE PLANNING  Goal: Discharge to home or other facility with appropriate resources  Description  INTERVENTIONS:  - Identify barriers to discharge w/patient and caregiver  - Arrange for needed discharge resources and transportation as appropriate  - Identify discharge learning needs (meds, wound care, etc )  - Arrange for interpretive services to assist at discharge as needed  - Refer to Case Management Department for coordinating discharge planning if the patient needs post-hospital services based on physician/advanced practitioner order or complex needs related to functional status, cognitive ability, or social support system  Outcome: Progressing     Problem: Knowledge Deficit  Goal: Patient/family/caregiver demonstrates understanding of disease process, treatment plan, medications, and discharge instructions  Description  Complete learning assessment and assess knowledge base    Interventions:  - Provide teaching at level of understanding  - Provide teaching via preferred learning methods  Outcome: Progressing     Problem: NEUROSENSORY - ADULT  Goal: Achieves stable or improved neurological status  Description  INTERVENTIONS  - Monitor and report changes in neurological status  - Monitor vital signs such as temperature, blood pressure, glucose, and any other labs ordered   - Initiate measures to prevent increased intracranial pressure  - Monitor for seizure activity and implement precautions if appropriate      Outcome: Progressing  Goal: Remains free of injury related to seizures activity  Description  INTERVENTIONS  - Maintain airway, patient safety  and administer oxygen as ordered  - Monitor patient for seizure activity, document and report duration and description of seizure to physician/advanced practitioner  - If seizure occurs,  ensure patient safety during seizure  - Reorient patient post seizure  - Seizure pads on all 4 side rails  - Instruct patient/family to notify RN of any seizure activity including if an aura is experienced  - Instruct patient/family to call for assistance with activity based on nursing assessment  - Administer anti-seizure medications if ordered    Outcome: Progressing  Goal: Achieves maximal functionality and self care  Description  INTERVENTIONS  - Monitor swallowing and airway patency with patient fatigue and changes in neurological status  - Encourage and assist patient to increase activity and self care     - Encourage visually impaired, hearing impaired and aphasic patients to use assistive/communication devices  Outcome: Progressing     Problem: CARDIOVASCULAR - ADULT  Goal: Maintains optimal cardiac output and hemodynamic stability  Description  INTERVENTIONS:  - Monitor I/O, vital signs and rhythm  - Monitor for S/S and trends of decreased cardiac output  - Administer and titrate ordered vasoactive medications to optimize hemodynamic stability  - Assess quality of pulses, skin color and temperature  - Assess for signs of decreased coronary artery perfusion  - Instruct patient to report change in severity of symptoms  Outcome: Progressing  Goal: Absence of cardiac dysrhythmias or at baseline rhythm  Description  INTERVENTIONS:  - Continuous cardiac monitoring, vital signs, obtain 12 lead EKG if ordered  - Administer antiarrhythmic and heart rate control medications as ordered  - Monitor electrolytes and administer replacement therapy as ordered  Outcome: Progressing     Problem: RESPIRATORY - ADULT  Goal: Achieves optimal ventilation and oxygenation  Description  INTERVENTIONS:  - Assess for changes in respiratory status  - Assess for changes in mentation and behavior  - Position to facilitate oxygenation and minimize respiratory effort  - Oxygen administered by appropriate delivery if ordered  - Initiate smoking cessation education as indicated  - Encourage broncho-pulmonary hygiene including cough, deep breathe, Incentive Spirometry  - Assess the need for suctioning and aspirate as needed  - Assess and instruct to report SOB or any respiratory difficulty  - Respiratory Therapy support as indicated  Outcome: Progressing

## 2019-12-16 ENCOUNTER — APPOINTMENT (INPATIENT)
Dept: RADIOLOGY | Facility: HOSPITAL | Age: 79
DRG: 219 | End: 2019-12-16
Payer: COMMERCIAL

## 2019-12-16 LAB
ABO GROUP BLD BPU: NORMAL
ANION GAP SERPL CALCULATED.3IONS-SCNC: 12 MMOL/L (ref 4–13)
ANION GAP SERPL CALCULATED.3IONS-SCNC: 9 MMOL/L (ref 4–13)
ATRIAL RATE: 89 BPM
BPU ID: NORMAL
BUN SERPL-MCNC: 44 MG/DL (ref 5–25)
BUN SERPL-MCNC: 54 MG/DL (ref 5–25)
CALCIUM SERPL-MCNC: 7.8 MG/DL (ref 8.3–10.1)
CALCIUM SERPL-MCNC: 8.3 MG/DL (ref 8.3–10.1)
CHLORIDE SERPL-SCNC: 114 MMOL/L (ref 100–108)
CHLORIDE SERPL-SCNC: 115 MMOL/L (ref 100–108)
CO2 SERPL-SCNC: 20 MMOL/L (ref 21–32)
CO2 SERPL-SCNC: 23 MMOL/L (ref 21–32)
CREAT SERPL-MCNC: 2.97 MG/DL (ref 0.6–1.3)
CREAT SERPL-MCNC: 3.38 MG/DL (ref 0.6–1.3)
ERYTHROCYTE [DISTWIDTH] IN BLOOD BY AUTOMATED COUNT: 13.9 % (ref 11.6–15.1)
GFR SERPL CREATININE-BSD FRML MDRD: 17 ML/MIN/1.73SQ M
GFR SERPL CREATININE-BSD FRML MDRD: 19 ML/MIN/1.73SQ M
GLUCOSE SERPL-MCNC: 132 MG/DL (ref 65–140)
GLUCOSE SERPL-MCNC: 142 MG/DL (ref 65–140)
GLUCOSE SERPL-MCNC: 145 MG/DL (ref 65–140)
GLUCOSE SERPL-MCNC: 152 MG/DL (ref 65–140)
GLUCOSE SERPL-MCNC: 158 MG/DL (ref 65–140)
GLUCOSE SERPL-MCNC: 162 MG/DL (ref 65–140)
GLUCOSE SERPL-MCNC: 173 MG/DL (ref 65–140)
GLUCOSE SERPL-MCNC: 183 MG/DL (ref 65–140)
GLUCOSE SERPL-MCNC: 185 MG/DL (ref 65–140)
GLUCOSE SERPL-MCNC: 192 MG/DL (ref 65–140)
HCT VFR BLD AUTO: 34.4 % (ref 36.5–49.3)
HCT VFR BLD AUTO: 39.6 % (ref 36.5–49.3)
HGB BLD-MCNC: 11.3 G/DL (ref 12–17)
HGB BLD-MCNC: 12.9 G/DL (ref 12–17)
MAGNESIUM SERPL-MCNC: 2.8 MG/DL (ref 1.6–2.6)
MCH RBC QN AUTO: 30.5 PG (ref 26.8–34.3)
MCHC RBC AUTO-ENTMCNC: 32.8 G/DL (ref 31.4–37.4)
MCV RBC AUTO: 93 FL (ref 82–98)
P AXIS: 56 DEGREES
PLATELET # BLD AUTO: 147 THOUSANDS/UL (ref 149–390)
PMV BLD AUTO: 10.3 FL (ref 8.9–12.7)
POTASSIUM SERPL-SCNC: 3.9 MMOL/L (ref 3.5–5.3)
POTASSIUM SERPL-SCNC: 4.1 MMOL/L (ref 3.5–5.3)
POTASSIUM SERPL-SCNC: 4.3 MMOL/L (ref 3.5–5.3)
PR INTERVAL: 183 MS
QRS AXIS: 50 DEGREES
QRSD INTERVAL: 88 MS
QT INTERVAL: 433 MS
QTC INTERVAL: 527 MS
RBC # BLD AUTO: 3.7 MILLION/UL (ref 3.88–5.62)
SODIUM SERPL-SCNC: 146 MMOL/L (ref 136–145)
SODIUM SERPL-SCNC: 147 MMOL/L (ref 136–145)
T WAVE AXIS: 22 DEGREES
UNIT DISPENSE STATUS: NORMAL
UNIT PRODUCT CODE: NORMAL
UNIT RH: NORMAL
VENTRICULAR RATE: 89 BPM
WBC # BLD AUTO: 21.18 THOUSAND/UL (ref 4.31–10.16)

## 2019-12-16 PROCEDURE — G8979 MOBILITY GOAL STATUS: HCPCS

## 2019-12-16 PROCEDURE — G8978 MOBILITY CURRENT STATUS: HCPCS

## 2019-12-16 PROCEDURE — 80048 BASIC METABOLIC PNL TOTAL CA: CPT | Performed by: PHYSICIAN ASSISTANT

## 2019-12-16 PROCEDURE — G8987 SELF CARE CURRENT STATUS: HCPCS

## 2019-12-16 PROCEDURE — 97167 OT EVAL HIGH COMPLEX 60 MIN: CPT

## 2019-12-16 PROCEDURE — 97535 SELF CARE MNGMENT TRAINING: CPT

## 2019-12-16 PROCEDURE — 99024 POSTOP FOLLOW-UP VISIT: CPT | Performed by: THORACIC SURGERY (CARDIOTHORACIC VASCULAR SURGERY)

## 2019-12-16 PROCEDURE — 82948 REAGENT STRIP/BLOOD GLUCOSE: CPT

## 2019-12-16 PROCEDURE — 85027 COMPLETE CBC AUTOMATED: CPT | Performed by: PHYSICIAN ASSISTANT

## 2019-12-16 PROCEDURE — 93010 ELECTROCARDIOGRAM REPORT: CPT | Performed by: INTERNAL MEDICINE

## 2019-12-16 PROCEDURE — 84132 ASSAY OF SERUM POTASSIUM: CPT | Performed by: PHYSICIAN ASSISTANT

## 2019-12-16 PROCEDURE — 93005 ELECTROCARDIOGRAM TRACING: CPT

## 2019-12-16 PROCEDURE — 71045 X-RAY EXAM CHEST 1 VIEW: CPT

## 2019-12-16 PROCEDURE — 85014 HEMATOCRIT: CPT | Performed by: PHYSICIAN ASSISTANT

## 2019-12-16 PROCEDURE — 99233 SBSQ HOSP IP/OBS HIGH 50: CPT | Performed by: STUDENT IN AN ORGANIZED HEALTH CARE EDUCATION/TRAINING PROGRAM

## 2019-12-16 PROCEDURE — 85018 HEMOGLOBIN: CPT | Performed by: PHYSICIAN ASSISTANT

## 2019-12-16 PROCEDURE — G8988 SELF CARE GOAL STATUS: HCPCS

## 2019-12-16 PROCEDURE — 97163 PT EVAL HIGH COMPLEX 45 MIN: CPT

## 2019-12-16 PROCEDURE — 83735 ASSAY OF MAGNESIUM: CPT | Performed by: PHYSICIAN ASSISTANT

## 2019-12-16 RX ORDER — POTASSIUM CHLORIDE 20 MEQ/1
20 TABLET, EXTENDED RELEASE ORAL 2 TIMES DAILY
Status: DISCONTINUED | OUTPATIENT
Start: 2019-12-16 | End: 2019-12-17

## 2019-12-16 RX ORDER — FUROSEMIDE 10 MG/ML
10 SYRINGE (ML) INJECTION CONTINUOUS
Status: DISCONTINUED | OUTPATIENT
Start: 2019-12-16 | End: 2019-12-19

## 2019-12-16 RX ORDER — ALBUMIN (HUMAN) 12.5 G/50ML
25 SOLUTION INTRAVENOUS ONCE
Status: COMPLETED | OUTPATIENT
Start: 2019-12-16 | End: 2019-12-16

## 2019-12-16 RX ORDER — HEPARIN SODIUM 5000 [USP'U]/ML
5000 INJECTION, SOLUTION INTRAVENOUS; SUBCUTANEOUS EVERY 8 HOURS SCHEDULED
Status: DISCONTINUED | OUTPATIENT
Start: 2019-12-17 | End: 2019-12-21

## 2019-12-16 RX ORDER — FUROSEMIDE 10 MG/ML
40 INJECTION INTRAMUSCULAR; INTRAVENOUS
Status: DISCONTINUED | OUTPATIENT
Start: 2019-12-16 | End: 2019-12-16

## 2019-12-16 RX ORDER — FUROSEMIDE 10 MG/ML
80 INJECTION INTRAMUSCULAR; INTRAVENOUS ONCE
Status: COMPLETED | OUTPATIENT
Start: 2019-12-16 | End: 2019-12-16

## 2019-12-16 RX ORDER — METOLAZONE 10 MG/1
5 TABLET ORAL ONCE
Status: COMPLETED | OUTPATIENT
Start: 2019-12-16 | End: 2019-12-16

## 2019-12-16 RX ADMIN — AMIODARONE HYDROCHLORIDE 200 MG: 200 TABLET ORAL at 14:04

## 2019-12-16 RX ADMIN — POTASSIUM CHLORIDE 20 MEQ: 1500 TABLET, EXTENDED RELEASE ORAL at 08:15

## 2019-12-16 RX ADMIN — FUROSEMIDE 40 MG: 10 INJECTION, SOLUTION INTRAMUSCULAR; INTRAVENOUS at 08:11

## 2019-12-16 RX ADMIN — ASPIRIN 325 MG ORAL TABLET 325 MG: 325 PILL ORAL at 08:10

## 2019-12-16 RX ADMIN — INSULIN LISPRO 1 UNITS: 100 INJECTION, SOLUTION INTRAVENOUS; SUBCUTANEOUS at 15:58

## 2019-12-16 RX ADMIN — PANTOPRAZOLE SODIUM 40 MG: 40 TABLET, DELAYED RELEASE ORAL at 08:10

## 2019-12-16 RX ADMIN — MUPIROCIN 1 APPLICATION: 20 OINTMENT TOPICAL at 21:25

## 2019-12-16 RX ADMIN — NOREPINEPHRINE BITARTRATE 18 MCG/MIN: 1 INJECTION INTRAVENOUS at 00:00

## 2019-12-16 RX ADMIN — METOLAZONE 5 MG: 10 TABLET ORAL at 16:01

## 2019-12-16 RX ADMIN — FONDAPARINUX SODIUM 2.5 MG: 2.5 INJECTION, SOLUTION SUBCUTANEOUS at 08:11

## 2019-12-16 RX ADMIN — CHLORHEXIDINE GLUCONATE 0.12% ORAL RINSE 15 ML: 1.2 LIQUID ORAL at 08:10

## 2019-12-16 RX ADMIN — FUROSEMIDE 80 MG: 10 INJECTION, SOLUTION INTRAMUSCULAR; INTRAVENOUS at 11:48

## 2019-12-16 RX ADMIN — ATORVASTATIN CALCIUM 80 MG: 80 TABLET, FILM COATED ORAL at 16:03

## 2019-12-16 RX ADMIN — Medication 40 MG/HR: at 20:08

## 2019-12-16 RX ADMIN — VASOPRESSIN 0.04 UNITS/MIN: 20 INJECTION INTRAVENOUS at 00:00

## 2019-12-16 RX ADMIN — METOPROLOL TARTRATE 12.5 MG: 25 TABLET ORAL at 21:26

## 2019-12-16 RX ADMIN — AMIODARONE HYDROCHLORIDE 200 MG: 200 TABLET ORAL at 05:00

## 2019-12-16 RX ADMIN — MILRINONE LACTATE IN DEXTROSE 0.13 MCG/KG/MIN: 200 INJECTION, SOLUTION INTRAVENOUS at 00:00

## 2019-12-16 RX ADMIN — MUPIROCIN 1 APPLICATION: 20 OINTMENT TOPICAL at 08:10

## 2019-12-16 RX ADMIN — INSULIN LISPRO 1 UNITS: 100 INJECTION, SOLUTION INTRAVENOUS; SUBCUTANEOUS at 11:02

## 2019-12-16 RX ADMIN — Medication 20 MG/HR: at 12:26

## 2019-12-16 RX ADMIN — POLYETHYLENE GLYCOL 3350 17 G: 17 POWDER, FOR SOLUTION ORAL at 08:11

## 2019-12-16 RX ADMIN — ALBUMIN (HUMAN) 25 G: 12.5 SOLUTION INTRAVENOUS at 01:40

## 2019-12-16 RX ADMIN — CEFAZOLIN SODIUM 2000 MG: 2 SOLUTION INTRAVENOUS at 04:49

## 2019-12-16 RX ADMIN — INSULIN LISPRO 1 UNITS: 100 INJECTION, SOLUTION INTRAVENOUS; SUBCUTANEOUS at 22:16

## 2019-12-16 RX ADMIN — POTASSIUM CHLORIDE 20 MEQ: 1500 TABLET, EXTENDED RELEASE ORAL at 17:44

## 2019-12-16 RX ADMIN — CHLORHEXIDINE GLUCONATE 0.12% ORAL RINSE 15 ML: 1.2 LIQUID ORAL at 17:44

## 2019-12-16 NOTE — PHYSICAL THERAPY NOTE
Physical Therapy Evaluation     Patient's Name: [facility] A Core    Admitting Diagnosis  Chest pain [R07 9]    Problem List  Patient Active Problem List   Diagnosis    Rotator cuff tear arthropathy, right    Secondary osteoarthritis of right shoulder    Aftercare following surgery of the musculoskeletal system    Benign essential hypertension    Abnormal TSH    Overweight (BMI 25 0-29  9)    Chronic midline back pain    Benign hypertension with CKD (chronic kidney disease) stage III (HCC)    Benign prostatic hyperplasia without lower urinary tract symptoms    Bradycardia    SOBOE (shortness of breath on exertion)    Dissection of thoracic aorta (HCC)    S/P aorta repair       Past Medical History  Past Medical History:   Diagnosis Date    Arthritis     BPH without urinary obstruction     CKD (chronic kidney disease), stage III (HCC)     baseline 1 6-1 7    GERD (gastroesophageal reflux disease)     Hyperlipidemia     Hypertension        Past Surgical History  Past Surgical History:   Procedure Laterality Date    APPENDECTOMY      COLONOSCOPY  2017    FRACTURE SURGERY      ME ASCEND AORTA GRAFT W ROOT REPLACMENT  VALVE CONDUIT/CORON RECONSTRUCT N/A 12/15/2019    Procedure: BENTALL PROCEDURE (ASCENDING AORTIC REPAIR) with 26mm Gelweave Graft;  Surgeon: Sandra Farias DO;  [facility];  Service: Cardiac Surgery    ME RECONSTR TOTAL SHOULDER IMPLANT Right 12/5/2017    Procedure: ARTHROPLASTY SHOULDER REVERSE with OPEN CYST EXCISION;  Surgeon: Jessica Reyes MD;  [facility];  Service: Orthopedics    SHOULDER SURGERY      WRIST SURGERY          12/16/19 0832   Note Type   Note type Eval only   Pain Assessment   Pain Assessment No/denies pain   Home Living   Type of Home Apartment   Home Layout One level  (denies CHRISTOPHER)   Prior Function   Level of [place] Independent with ADLs and functional mobility  (amb w/o AD)   Lives With Spouse   Restrictions/Precautions   Braces or Orthoses   (denies)   Other Precautions Cardiac/sternal;Multiple lines;Telemetry;O2;Fall Risk  (Blum catheter, a-line)   General   Additional Pertinent History Cleared for assessment (spoke to nsg)   Cognition   Overall Cognitive Status [facility]   Arousal/Participation Alert   Orientation Level Oriented to person;Oriented to place;Oriented to situation   Memory Decreased recall of precautions   Following Commands Follows one step commands without difficulty   Comments Pt is in the chair; responds to questions appropriately and able to communicate/follow directions in English fairly well; agreeable to mobilize; denies dizziness;   RUE Assessment   RUE Assessment WFL  (AROM)   LUE Assessment   LUE Assessment WFL  (AROM)   RLE Assessment   RLE Assessment WFL  (AROM)   Strength RLE   RLE Overall Strength   (fair +/ good - (grossly))   LLE Assessment   LLE Assessment WFL  (AROM)   Strength LLE   LLE Overall Strength   (fair +/ good - (grossly))   Transfers   Sit to Stand 3  Moderate assistance   Additional items Assist x 1;Verbal cues   Stand to Sit 3  Moderate assistance   Additional items Assist x 1;Verbal cues   Ambulation/Elevation   Gait pattern Excessively slow; Short stride; Inconsistent shandra   Gait Assistance 3  Moderate assist   Additional items Assist x 1;Verbal cues; Tactile cues  (stand by (A) of 2 more for lines)   Assistive Device Rolling walker   Distance 12 ft total distance at bedside; limited by lines   Balance   Static Sitting Fair -   Static Standing Poor +   Ambulatory Poor   Activity Tolerance   Activity Tolerance Patient limited by fatigue; Other (Comment)  (episode of vomiting after returning to chair; RN present)   Nurse Made Aware spoke to Luis tapia RN and SKINNY Coburn   Assessment   Prognosis Good   Problem List Decreased strength;Decreased endurance; Impaired balance;Decreased mobility   Assessment Pt is 70 y o  male admitted with hx of acute onset of chest pain radiating to his central back and 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, ascending aortic aneurysm; pt underwent replacement of ascending aorta with aggressive hemiarch reconstruction and aortic valve resuspension with a 26 mm Dacron graft on 12/15/2019  Pt 's comorbidities affecting POC include: arthritis, CKD, HTN and hx of (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 and personal factors of: being (I) w/ all aspects of mobility w/o use of an AD prior to admission  Pt's clinical presentation is currently unstable/unpredictable which is evident in ongoing telemetry monitoring while in a critical care unit w/ Ronit Pollen catheter and a-line in place, abnormal lab values being monitored/trending, CT in place, suppl O2 needs, and inability to progress further w/ mobilization at this time; an episode of vomiting post mobilization  Pt presents w/ generalized weakness, incl decreased LE strength, decreased functional endurance and activity tolerance, impaired balance w/ associated gait deviations requiring use of rw at this time and fall risk  Will cont to follow pt in PT for progressive mobilization to address above functional deficits and to max level of (I), endurance, and safety  Otherwise, anticipate pt will return home w/ available family support upon D/C provided he cont improving w/ mobility skills, safety, and endurance and when medically cleared; home PT follow up is recommended at this time; will follow  Goals   Patient Goals to get better   STG Expiration Date 12/26/19   Short Term Goal #1 7-10 days  Pt will amb 300 ft w/ least restrictive assistive device PRN, mod (I) in order to facilitate safe return to premorbid environment and community amb status  Pt will achieve (I) level w/ bed mob in order to facilitate safety with OOB and back to bed transitions in own living environment  Pt will perform transfers w/ mod (I) to assure (I) and safety w/ functional mobility/transitions w/ all aspects of mobility/locomotion    Pt will participate in LE therex and balance activities to max progression w/ mobility skills  PT Treatment Day 0   Plan   Treatment/Interventions Functional transfer training;LE strengthening/ROM; Therapeutic exercise; Endurance training;Bed mobility;Gait training;Spoke to nursing;OT   PT Frequency Other (Comment)  (4-6x/wk)   Recommendation   Recommendation Home PT; Home with family support   Equipment Recommended Walker  (at this time)   Modified [place] Scale   Modified [place] Scale 4   Barthel Index   Feeding 10   Bathing 0   Grooming Score 5   Dressing Score 5   Bladder Score 0   Bowels Score 10   Toilet Use Score 5   Transfers (Bed/Chair) Score 5   Mobility (Level Surface) Score 0   Stairs Score 0   Barthel Index Score 40           Navneet Del Angel, PT

## 2019-12-16 NOTE — SOCIAL WORK
70yo male is POD#1 repair of dissection of thoracic aorta  He is alert and oriented, oob in chair, independent ADLS, retired  Present are his wife Darrel Busby, phone [phone], who primarily speaks [place], and his spokesperson granddaughter Lexus Garcia, phone [phone]  Daughter Solomon Montana is POA, copy requested  They live in one story home with 0 CHRISTOPHER  He has a cane, RW, nebulizer, BP cuff, CPAP and shower chair  He has had no falls  He has hx [facility] and requests [facility]  He has no hx rehab and denies mental illness and D&A  His PCP is Dr Jose Van and he uses [facility] on [address] , would like d/c meds sent there  Discussed HHC options, has used [facility] in the past and would like to use them again  Referral sent for nsg and PT  A post acute care recommendation was made by your care team for [facility]  Discussed [place] [facility] with patient  List of agencies given to patient via in person  both patient and caregiver aware the list is custom filtered for them by preference  and that [facility] post acute providers are designated  CM reviewed d/c planning process including the following: identifying help at home, patient preference for d/c planning needs, Discharge Lounge, [facility] Meds to Bed program, availability of treatment team to discuss questions or concerns patient and/or family may have regarding understanding medications and recognizing signs and symptoms once discharged  CM also encouraged patient to follow up with all recommended appointments after discharge  Patient advised of importance for patient and family to participate in managing patients medical well being  Patient/caregiver received discharge checklist  Content reviewed  Patient/caregiver encouraged to participate in discharge plan of care prior to discharge home

## 2019-12-16 NOTE — PLAN OF CARE
Problem: PHYSICAL THERAPY ADULT  Goal: Performs mobility at highest level of function for planned discharge setting  See evaluation for individualized goals  Description  Treatment/Interventions: Functional transfer training, LE strengthening/ROM, Therapeutic exercise, Endurance training, Bed mobility, Gait training, Spoke to nursing, OT  Equipment Recommended: Walker(at this time)       See flowsheet documentation for full assessment, interventions and recommendations  Note:   Prognosis: Good  Problem List: Decreased strength, Decreased endurance, Impaired balance, Decreased mobility  Assessment: Pt is 70 y o  male admitted with hx of acute onset of chest pain radiating to his central back and 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, ascending aortic aneurysm; pt underwent replacement of ascending aorta with aggressive hemiarch reconstruction and aortic valve resuspension with a 26 mm Dacron graft on 12/15/2019  Pt 's comorbidities affecting POC include: arthritis, CKD, HTN and hx of (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 and personal factors of: being (I) w/ all aspects of mobility w/o use of an AD prior to admission  Pt's clinical presentation is currently unstable/unpredictable which is evident in ongoing telemetry monitoring while in a critical care unit w/ Rogerio Montane catheter and a-line in place, abnormal lab values being monitored/trending, CT in place, suppl O2 needs, and inability to progress further w/ mobilization at this time; an episode of vomiting post mobilization  Pt presents w/ generalized weakness, incl decreased LE strength, decreased functional endurance and activity tolerance, impaired balance w/ associated gait deviations requiring use of rw at this time and fall risk  Will cont to follow pt in PT for progressive mobilization to address above functional deficits and to max level of (I), endurance, and safety   Otherwise, anticipate pt will return home w/ available family support upon D/C provided he cont improving w/ mobility skills, safety, and endurance and when medically cleared; home PT follow up is recommended at this time; will follow  Recommendation: Home PT, Home with family support          See flowsheet documentation for full assessment

## 2019-12-16 NOTE — OCCUPATIONAL THERAPY NOTE
OccupationalTherapy Evaluation & TX     Patient Name: Carmen Mathew Date: 12/16/2019  Problem List  Principal Problem:    Dissection of thoracic aorta ([address] )  Active Problems:    Benign essential hypertension    Abnormal TSH    Benign hypertension with CKD (chronic kidney disease) stage III (HCC)    Benign prostatic hyperplasia without lower urinary tract symptoms    S/P aorta repair    Past Medical History  Past Medical History:   Diagnosis Date    Arthritis     BPH without urinary obstruction     CKD (chronic kidney disease), stage III (HCC)     baseline 1 6-1 7    GERD (gastroesophageal reflux disease)     Hyperlipidemia     Hypertension      Past Surgical History  Past Surgical History:   Procedure Laterality Date    APPENDECTOMY      COLONOSCOPY  2017    FRACTURE SURGERY      PA ASCEND AORTA GRAFT W ROOT REPLACMENT  VALVE CONDUIT/CORON RECONSTRUCT N/A 12/15/2019    Procedure: BENTALL PROCEDURE (ASCENDING AORTIC REPAIR) with 26mm Gelweave Graft;  Surgeon: Cornel Tobias DO;  [facility];  Service: Cardiac Surgery    PA RECONSTR TOTAL SHOULDER IMPLANT Right 12/5/2017    Procedure: ARTHROPLASTY SHOULDER REVERSE with OPEN CYST EXCISION;  Surgeon: Marisabel Persaud MD;  [facility];  Service: Orthopedics    SHOULDER SURGERY      WRIST SURGERY          12/16/19 1330   Note Type   Note type Eval/Treat   Restrictions/Precautions   Weight Bearing Precautions Per Order No   Other Precautions Cardiac/sternal;Multiple lines;Telemetry; Fall Risk;O2  (SG, a-line)   Pain Assessment   Pain Assessment No/denies pain   Pain Score No Pain   Home Living   Type of Home Apartment   Home Layout One level   Bathroom Shower/Tub Walk-in shower   Bathroom Toilet Raised   Bathroom Equipment Grab bars in shower;Built-in shower seat;Grab bars around toilet   Prior Function   Level of [place] Independent with ADLs and functional mobility   Lives With Spouse; Family  (granddtr helps care for patient)   Receives Help From Family   ADL Assistance Independent   IADLs Needs assistance   Falls in the last 6 months 0   Vocational Retired   Lifestyle   Autonomy pta pt reports I in ADLs, shares IADLs, and I for functional mobility   Reciprocal Relationships supportive family; someone will be able to assist PRN upon d/c   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   Intrinsic Gratification likes doing  work   Psychosocial   Psychosocial (WDL) [address] "If these things weren't here I would just go"   ADL   Where Assessed Chair   Eating Assistance 5  Supervision/Setup   Grooming Assistance 5  Supervision/Setup   UB Bathing Assistance 4  Minimal Assistance   LB [address] 3  Moderate Assistance   [address] 4  Minimal Assistance    [address] 3  Moderate Assistance   [address]  4  Minimal Assistance   Transfers   Sit to Stand 3  Moderate assistance   Additional items Assist x 1   Stand to Sit 4  Minimal assistance   Additional items Assist x 1   Functional Mobility   Functional Mobility 4  Minimal assistance   Additional Comments steps forward and backwards   Additional items Rolling walker   Balance   Static Sitting Fair   Dynamic Sitting Fair -   Static Standing Poor +   Dynamic Standing Poor +   Ambulatory Poor +   Activity Tolerance   Activity Tolerance Other (Comment)  (limited 2* SG)   Nurse Made Aware okay to see per RN   RUE Assessment   RUE Assessment WFL   LUE Assessment   LUE Assessment WFL   Hand Function   Gross Motor Coordination Functional   Fine Motor Coordination Functional   Cognition   Overall Cognitive Status WFL   Arousal/Participation Cooperative   Attention Within functional limits   Orientation Level Oriented X4   Memory Within functional limits   Following Commands Follows all commands and directions without difficulty   Comments pt pleasant and cooperative; able to communicate in English   Assessment   Limitation Decreased ADL status; Decreased Safe judgement during ADL;Decreased endurance;Decreased self-care trans;Decreased high-level ADLs   Prognosis Good   Assessment Pt is a 70 y o  YO  male admitted to [facility] on 2019 w/ dissection of thoracic aorta w/ intramural hematoma s/p replacement of ascending aorta w/ aggressive hemiarch reconstruction and aortic valve suspension on 12/15/19  Comorbidities include a h/o CKD, HTN, arthritis, and R reverese TSA   Pt with active OT orders and up as tolerated orders  Pt resides in a 1st floor apt with spouse and granddtr  Pt was I w/  ADLS, sharedIADLS, (+) drove, & required no use of DME PTA  Currently pt is Min A for Ub ADLs, Mod A for LB ADLs and Min A for functional mobility  Pt is limited at this time 2*: endurance, activity tolerance, functional mobility, functional standing tolerance, unsupportive home environment, strength, decreased safety awareness and decreased insight into deficits  The following Occupational Performance Areas to address include: grooming, bathing/shower, toilet hygiene, dressing, functional mobility and household maintenance  Pt scored overall  40/100 on the Barthel Index  Based on the aforementioned OT evaluation, functional performance deficits, and assessments, pt has been identified as a high complexity evaluation  From OT standpoint, anticipate d/c home with family support  Pt to continue to benefit from acute immediate OT services to address the following goals 3-5x/week to  w/in 7-10 days: Marley Xie Goals   Patient Goals go home   LTG Time Frame 7-10   Plan   Treatment Interventions ADL retraining;Functional transfer training; Endurance training;Patient/family training;Equipment evaluation/education; Compensatory technique education;Continued evaluation;Cardiac education; Energy conservation; Activityengagement   Goal Expiration Date 19   OT Treatment Day 1   OT Frequency 3-5x/wk   Additional Treatment Session   Start Time 1320   End Time 1330 Treatment Assessment Pt provided s/p cardiac sx education packet, reviewed w/ OT, discussed cardiac/sternal precautions, lifestyle modifications, post hospitalization IADL management, environmental temperature control, emotional regulation and management, lifting/driving restrictions, energy conservation techniques, mobility schedule, incisional management, VNA, and cardiac rehabilitation initiation upon clearance per physician at follow up  Pt reviewed education packet and acknowledged reception of such  Pt's spouse, granddtr, and dtr were present t/o the education session  Recommendation   OT Discharge Recommendation Home with family support   OT - OK to Discharge No   Barthel Index   Feeding 10   Bathing 0   Grooming Score 5   Dressing Score 5   Bladder Score 0   Bowels Score 10   Toilet Use Score 5   Transfers (Bed/Chair) Score 5   Mobility (Level Surface) Score 0   Stairs Score 0   Barthel Index Score 40   Modified Cascade Scale   Modified Jennyfer Scale 4     GOALS    1) Pt will increase activity tolerance to G for 30 min txment sessions    2) Pt will complete UB/LB dressing/self care w/ mod I using adaptive device and DME as needed    3) Pt will complete bathing w/ Mod I w/ use of AE and DME as needed    4) Pt will complete toileting w/ mod I w/ G hygiene/thoroughness using DME as needed    5) Pt will improve functional transfers to Mod I on/off all surfaces using DME as needed w/ G balance/safety     6) Pt will improve functional mobility during ADL/IADL/leisure tasks to Mod I using DME as needed w/ G balance/safety     7) Pt will participate in simulated IADL management task to increase independence to  w/ G safety and endurance    8) Pt will demonstrate G carryover of pt/caregiver cardiac education and training as appropriate      9) Pt will demonstrate 100% carryover of energy conservation techniques t/o functional I/ADL/leisure tasks w/o cues s/p skilled education          Jonathan Siddiqui MS, OTR/L

## 2019-12-16 NOTE — PROGRESS NOTES
Progress Note - Cardiothoracic Surgery   [facility] A Core 70 y o  male MRN: [id number]  Unit/Bed#: [facility] 413-01 Encounter: [id number]      POD # 1 s/p repair of type A dissection/intramural hematoma    Pt seen/examined  Interval history and data reviewed with critical care team   Pt doing well  No specific complaints    OOBTC  AFSHIN with decreased UOP  Hemodynamics stable, weaning off drips        Medications:   Scheduled Meds:  Current Facility-Administered Medications:  acetaminophen 650 mg Rectal Q4H PRN Pink Soles, PA-C    acetaminophen 650 mg Oral Q4H PRN Pink Soles, PA-C    amiodarone 200 mg Oral [facility] [place] Soles, PA-C    aspirin 325 mg Oral Daily Pink Soles, PA-C    atorvastatin 80 mg Oral Daily [employer] [place], PA-C    bisacodyl 10 mg Rectal Daily PRN Pink Soles, PA-C    calcium chloride 1 g Intravenous Once Pink Soles, PA-C    cefazolin 2,000 mg Intravenous Q8H Pink Soles, PA-C Last Rate: Stopped (12/16/19 0500)   chlorhexidine 15 mL Swish & Spit BID [place] Soles, PA-C    fentanyl citrate (PF) 50 mcg Intravenous Q1H PRN Pink Soles, PA-C    fondaparinux 2 5 mg Subcutaneous Daily Pink Soles, PA-C    furosemide 40 mg Intravenous Q6H PRN Pink Soles, PA-C    HYDROmorphone 1 mg Intravenous Q1H PRN Pink Soles, PA-C    insulin regular (HumuLIN R,NovoLIN R) infusion 0 3-21 Units/hr Intravenous Titrated Pink Soles, PA-C Last Rate: 3 Units/hr (12/16/19 0145)   lactated ringers 500 mL Intravenous Q30 Min PRN Pink Soles, PA-C Last Rate: 500 mL (12/15/19 1700)   lidocaine (cardiac) 100 mg Intravenous Q30 Min PRN Pink Soles, PA-C    milrinone [facility]) infusion 0 25 mcg/kg/min Intravenous Continuous [place], PA-C Last Rate: 0 13 mcg/kg/min (12/16/19 0000)   mupirocin 1 application Nasal [id number] [address] [place] Soles, PA-C    norepinephrine 1-30 mcg/min Intravenous Titrated Elida Magan, CRNP Last Rate: 3 mcg/min (12/16/19 0600)   ondansetron 4 mg Intravenous Q6H PRN Sylvie Morning, PA-C    oxyCODONE-acetaminophen 1 tablet Oral Q4H PRN Sylvie Morning, PA-C    oxyCODONE-acetaminophen 2 tablet Oral Q6H PRN Sylvie Morning, PA-C    pantoprazole 40 mg Oral Daily Sylvie Morning, PA-C    phenylephine  mcg/min Intravenous Titrated Jen Medico, PA-C Last Rate: Stopped (12/16/19 0408)   polyethylene glycol 17 g Oral Daily Sylvie Morning, PA-C    potassium chloride 20 mEq Intravenous Once PRN Sylvie Morning, PA-C    potassium chloride 20 mEq Intravenous Q1H PRN Sylvie Morning, PA-C    potassium chloride 20 mEq Intravenous Q30 Min PRN Sylvie Morning, PA-C    sodium chloride 20 mL/hr Intravenous Continuous [place] Morning, PA-C Last Rate: 20 mL/hr (12/15/19 1600)   vasopressin (PITRESSIN) in 0 9 % sodium chloride 100 mL 0 04 Units/min Intravenous Continuous Dejon Ari, CRNP Last Rate: 0 04 Units/min (12/16/19 0000)     Continuous Infusions:  insulin regular (HumuLIN R,NovoLIN R) infusion 0 3-21 Units/hr Last Rate: 3 Units/hr (12/16/19 0145)   milrinone (PRIMACOR) infusion 0 25 mcg/kg/min Last Rate: 0 13 mcg/kg/min (12/16/19 0000)   norepinephrine 1-30 mcg/min Last Rate: 3 mcg/min (12/16/19 0600)   phenylephine  mcg/min Last Rate: Stopped (12/16/19 0408)   sodium chloride 20 mL/hr Last Rate: 20 mL/hr (12/15/19 1600)   vasopressin (PITRESSIN) in 0 9 % sodium chloride 100 mL 0 04 Units/min Last Rate: 0 04 Units/min (12/16/19 0000)     PRN Meds:   acetaminophen    acetaminophen    bisacodyl    fentanyl citrate (PF)    furosemide    HYDROmorphone    lactated ringers    lidocaine (cardiac)    ondansetron    oxyCODONE-acetaminophen    oxyCODONE-acetaminophen    potassium chloride    potassium chloride    potassium chloride    Vitals: Blood pressure 96/57, pulse 82, temperature (!) 97 3 °F (36 3 °C), resp  rate 12, height 5' 9" (1 753 m), weight 83 5 kg (184 lb 1 4 oz), SpO2 96 %  ,Body mass index is 27 18 kg/m²  I/O last 24 hours:   In: 0 1 [P O :680; I V :3939 2; Blood:481; NG/GT:100; IV Piggyback:1250]  Out: 1180 [Urine:920; Chest Tube:260]  Invasive Devices     Central Venous Catheter Line            CVC Central Lines 12/15/19 Triple less than 1 day    Introducer 12/15/19 less than 1 day          Peripheral Intravenous Line            Peripheral IV 12/14/19 Right Antecubital 1 day    Peripheral IV 12/15/19 Right Wrist 1 day    Peripheral IV 12/15/19 Right Antecubital less than 1 day          Arterial Line            Arterial Line 12/15/19 Right Radial less than 1 day          Line            Pacer Wires less than 1 day    Pacer Wires less than 1 day          Drain            Chest Tube 1 Left Mediastinal 24 Fr  less than 1 day    Chest Tube 2 Anterior Mediastinal 32 Fr  less than 1 day    Chest Tube 3 Posterior Mediastinal 32 Fr  less than 1 day    Urethral Catheter Non-latex; Temperature probe less than 1 day                  Lab, Imaging and other studies:   Results from last 7 days   Lab Units 12/16/19  0356 12/16/19  0001 12/15/19  1619 12/15/19  1607  12/15/19  1354  12/15/19  0354 12/14/19  2005   WBC Thousand/uL 21 18*  --   --   --   --   --   --  11 24* 9 07   HEMOGLOBIN g/dL 11 3* 12 9  --  12 6  --   --   --  12 7 14 8   I STAT HEMOGLOBIN g/dl  --   --  11 9*  --    < >  --    < >  --   --    HEMATOCRIT % 34 4* 39 6  --  38 5  --   --   --  38 0 44 3   HEMATOCRIT, ISTAT %  --   --  35*  --    < >  --    < >  --   --    PLATELETS Thousands/uL 147*  --   --  169  --  77*  --  157 169    < > = values in this interval not displayed       Results from last 7 days   Lab Units 12/16/19  0356 12/16/19  0001 12/15/19  1918 12/15/19  1619 12/15/19  1607  12/15/19  0354   POTASSIUM mmol/L 4 1 4 3 4 4  --  4 0  --  3 8   CHLORIDE mmol/L 115*  --   --   --  115*  --  116*   CO2 mmol/L 23  --   --   --  24  --  24   CO2, I-STAT mmol/L  --   --   --  24  --    < >  --    BUN mg/dL 44*  --   --   --  33*  --  32*   CREATININE mg/dL 2 97*  --   --   -- 2 10*  --  1 70*   GLUCOSE, ISTAT mg/dl  --   --   --  95  --    < >  --    CALCIUM mg/dL 8 3  --   --   --  8 3  --  8 4    < > = values in this interval not displayed  Results from last 7 days   Lab Units 12/15/19  1607 12/15/19  0354   INR  1 75* 1 27*   PTT seconds 41*  --      Recent Labs     12/15/19  2040   PHART 7 320*   [id number] 16 7*   PO2ART 114 5   [id number] 33 1*   BEART -8 3           Plan:    DC Sudeep/[place]ordis/[place]  Continue chest tubes, pacing wires  Ambulate  Incentive spirometry  Diuresis - lasix 40 BID  PO ASA/Statin/ start B blocker once pressors weaned off    ICU for now        SIGNATURE: Annabella King DO  DATE: December 16, 2019  TIME: 7:41 AM

## 2019-12-16 NOTE — PLAN OF CARE
Problem: OCCUPATIONAL THERAPY ADULT  Goal: Performs self-care activities at highest level of function for planned discharge setting  See evaluation for individualized goals  Description  Treatment Interventions: ADL retraining, Functional transfer training, Endurance training, Patient/family training, Equipment evaluation/education, Compensatory technique education, Continued evaluation, Cardiac education, Energy conservation, Activityengagement          See flowsheet documentation for full assessment, interventions and recommendations  Note:   Limitation: Decreased ADL status, Decreased Safe judgement during ADL, Decreased endurance, Decreased self-care trans, Decreased high-level ADLs  Prognosis: Good  Assessment: Pt is a 70 y o  YO  male admitted to [facility] on 12/14/2019 w/ dissection of thoracic aorta w/ intramural hematoma s/p replacement of ascending aorta w/ aggressive hemiarch reconstruction and aortic valve suspension on 12/15/19  Comorbidities include a h/o CKD, HTN, arthritis, and R reverese TSA   Pt with active OT orders and up as tolerated orders  Pt resides in a 1st floor apt with spouse and granddtr  Pt was I w/  ADLS, sharedIADLS, (+) drove, & required no use of DME PTA  Currently pt is Min A for Ub ADLs, Mod A for LB ADLs and Min A for functional mobility  Pt is limited at this time 2*: endurance, activity tolerance, functional mobility, functional standing tolerance, unsupportive home environment, strength, decreased safety awareness and decreased insight into deficits  The following Occupational Performance Areas to address include: grooming, bathing/shower, toilet hygiene, dressing, functional mobility and household maintenance  Pt scored overall  40/100 on the Barthel Index  Based on the aforementioned OT evaluation, functional performance deficits, and assessments, pt has been identified as a high complexity evaluation  From OT standpoint, anticipate d/c home with family support   Pt to continue to benefit from acute immediate OT services to address the following goals 3-5x/week to  w/in 7-10 days:        OT Discharge Recommendation: Home with family support  OT - OK to Discharge: No

## 2019-12-16 NOTE — PROGRESS NOTES
Progress Note - Critical Care   Oliver Sharpe 70 y o  male MRN: [id number]  Unit/Bed#: [facility] 413-01 Encounter: [id number]      Attending Physician: Calos Salazar, DO    24 Hour Events/HPI: POD # 1 s/p replacement of ascending aorta with aggressive hemiarch reconstruction and aortic valve resuspension  Extubated post-operatively  Overnight primacor weaned to 0 13, levo weaned from 15 to 4, and vaso remains on at 0 04  Low urine output overnight with gentle IV fluid administration with some response  ROS: Review of Systems  Review of systems was reviewed and negative unless stated above in HPI/24-hour events   ---------------------------------------------------------------------------------------------------------------------------------------------------------------------  Impressions:  1  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  2  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  3  Hypertension  4  CKD stage 3  5  BPH  6  GERD  7  Hyperlipidemia    Plan:    Neuro:   · Pain controlled with: PRN tylenol, oxy  · Regulate sleep/wake cycle  · Delirium precautions  · CAM-ICU daily  · Trend neuro exam    CV:   · Cardiac infusions: Primacor, 0 13 mcg/kg/min, Levophed, 3 mcg/min, Vasopressin, 0 04 Units/min  · Wean levo/vaso as able  · Consider weaning off primacor if indexes remain stable  · MAP goal > 65 and CI >2 2  · Continue [place] Ferdinand catheter- can d/c if off primacor  · Continue Arterial line with pressor support  · Rhythm: NSR  · Follow rhythm on telemetry  · Hold B-blocker 2/2 pressor requirements  · Maintain epicardial pacing wires for POD 1    Lung:   · Acute post-op pulmonary insufficiency; Requiring 4 liters via nasal cannla, secondary to atelectasis  Continue incentive spirometry/coughing/deep breathing exercises    Wean supplemental oxygen as tolerated for saturation > 90%  · Wean NC as tolerated  · Chest tube output remains persistently high; Continue chest tubes to suction today    GI:   · Continue PPI for stress ulcer prophylaxis  · Continue bowel regimen  · Trend abdominal exam and bowel function    FEN:   · Diuretic plan: Lasix 40 mg IV q BID  · K-dur 20 mEQ PO q BID  · Nephro involvement in no response in UO?  · Nutrition/diet plan: cardiac diet  · Replete electrolytes with goals: K >4 0, Mag >2 0, and Phos >3 0    :   · Indwelling Hills present: yes   · Maintain Hills  · Trend UOP and BUN/creat  · Strict I and O    ID:   · Trend temps and WBC count  · Maintain normothermia    Heme:   · Trend hgb and plts    Endo:   · Glycemic control plan: No history of diabetes: Glucose well-controlled   Discontinue continuous insulin infusion and add Insulin sliding scale coverage    MSK/Skin:  · Mobility goal: OOB with meals, ambulate as able  · PT consult: yes  · OT consult: yes  · Frequent turning and pressure off-loading  · Local wound care as needed    Disposition:  Continue ICU care  ---------------------------------------------------------------------------------------------------------------------------------------------------------------------  Allergies: No Known Allergies  Medications:   Scheduled Meds:  Current Facility-Administered Medications:  acetaminophen 650 mg Rectal Q4H PRN Kelsie Jacob PA-C    acetaminophen 650 mg Oral Q4H PRN Kelsie Jacob PA-C    amiodarone 200 mg Oral [facility] Kelsie Jacob PA-C    aspirin 325 mg Oral Daily Kelsie Jacob PA-C    atorvastatin 80 mg Oral Daily [employer] RosaliaTRINY rodriguez    bisacodyl 10 mg Rectal Daily PRN Kelsie Presser, PA-C    calcium chloride 1 g Intravenous Once Kelsie Presser, PA-C    cefazolin 2,000 mg Intravenous Q8H Kelsie Presser, PA-C Last Rate: Stopped (12/16/19 0500)   chlorhexidine 15 mL Swish & Spit BID Kelsie Presser, PA-C    fentanyl citrate (PF) 50 mcg Intravenous Q1H PRN Kelsie Presser, PA-C    fondaparinux 2 5 mg Subcutaneous Daily Marie Coad, PA-C    furosemide 40 mg Intravenous Q6H PRN Marie Coad, PA-C    HYDROmorphone 1 mg Intravenous Q1H PRN Marie Coad, PA-C    insulin regular (HumuLIN R,NovoLIN R) infusion 0 3-21 Units/hr Intravenous Titrated Marie Coad, PA-C Last Rate: 3 Units/hr (12/16/19 0145)   lactated ringers 500 mL Intravenous Q30 Min PRN Marie Coad, PA-C Last Rate: 500 mL (12/15/19 1700)   lidocaine (cardiac) 100 mg Intravenous Q30 Min PRN Marie Coad, PA-C    milrinone [facility]) infusion 0 25 mcg/kg/min Intravenous Continuous Dagmar Holes, PA-C Last Rate: 0 13 mcg/kg/min (12/16/19 0000)   mupirocin 1 application Nasal [address] [facility] Marie Coad, PA-C    norepinephrine 1-30 mcg/min Intravenous Titrated Caridad Do, CRNP Last Rate: 4 mcg/min (12/16/19 0407)   ondansetron 4 mg Intravenous Q6H PRN Marie Coad, PA-C    oxyCODONE-acetaminophen 1 tablet Oral Q4H PRN Marie Coad, PA-C    oxyCODONE-acetaminophen 2 tablet Oral Q6H PRN Marie Coad, PA-C    pantoprazole 40 mg Oral Daily Marie Coad, PA-C    phenylephine  mcg/min Intravenous Titrated Dagmar Holes, PA-C Last Rate: Stopped (12/16/19 0408)   polyethylene glycol 17 g Oral Daily Marie Coad, PA-C    potassium chloride 20 mEq Intravenous Once PRN Marie Coad, PA-C    potassium chloride 20 mEq Intravenous Q1H PRN Marie Coad, PA-C    potassium chloride 20 mEq Intravenous Q30 Min PRN Marie Coad, PA-C    sodium chloride 20 mL/hr Intravenous Continuous Marie Coad, PA-C Last Rate: 20 mL/hr (12/15/19 1600)   vasopressin (PITRESSIN) in 0 9 % sodium chloride 100 mL 0 04 Units/min Intravenous Continuous Caridad Do, CRNP Last Rate: 0 04 Units/min (12/16/19 0000)     VTE Pharmacologic Prophylaxis: Fondaparinux (Arixtra)  VTE Mechanical Prophylaxis: sequential compression device    Continuous Infusions:  insulin regular (HumuLIN R,NovoLIN R) infusion 0 3-21 Units/hr Last Rate: 3 Units/hr (12/16/19 0145)   milrinone (PRIMACOR) infusion 0 25 mcg/kg/min Last Rate: 0 13 mcg/kg/min (12/16/19 0000)   norepinephrine 1-30 mcg/min Last Rate: 4 mcg/min (12/16/19 0407)   phenylephine  mcg/min Last Rate: Stopped (12/16/19 0408)   sodium chloride 20 mL/hr Last Rate: 20 mL/hr (12/15/19 1600)   vasopressin (PITRESSIN) in 0 9 % sodium chloride 100 mL 0 04 Units/min Last Rate: 0 04 Units/min (12/16/19 0000)     PRN Meds:    acetaminophen 650 mg Q4H PRN   acetaminophen 650 mg Q4H PRN   bisacodyl 10 mg Daily PRN   fentanyl citrate (PF) 50 mcg Q1H PRN   furosemide 40 mg Q6H PRN   HYDROmorphone 1 mg Q1H PRN   lactated ringers 500 mL Q30 Min PRN   lidocaine (cardiac) 100 mg Q30 Min PRN   ondansetron 4 mg Q6H PRN   oxyCODONE-acetaminophen 1 tablet Q4H PRN   oxyCODONE-acetaminophen 2 tablet Q6H PRN   potassium chloride 20 mEq Once PRN   potassium chloride 20 mEq Q1H PRN   potassium chloride 20 mEq Q30 Min PRN     Home Medications:   Prior to Admission medications    Medication Sig Start Date End Date Taking?  Authorizing Provider   amLODIPine (NORVASC) 10 mg tablet Reported on 11/30/2016 11/26/16  Yes Historical Provider, MD   FLUAD 0 5 ML GEORGIE inject 0 5 milliliter intramuscularly 9/15/18   Historical Provider, MD   hydrALAZINE (APRESOLINE) 25 mg tablet  4/30/19   Historical Provider, MD   hydrALAZINE (APRESOLINE) 50 mg tablet Take 1 tablet (50 mg total) by mouth every 8 (eight) hours for 30 days 4/25/19 5/25/19  Caro Echevarria MD   hydrochlorothiazide (HYDRODIURIL) 25 mg tablet TAKE 1 TABLET (25 MG TOTAL) BY MOUTH DAILY IN THE EARLY MORNING 2/23/18   Historical Provider, MD   losartan (COZAAR) 100 MG tablet take 1 tablet by mouth once daily (STOP LOSARTAN 50MG UNLESS YOUR DOCTOR WANTS OTHERWISE) 1/8/18   Historical Provider, MD   losartan (COZAAR) 50 mg tablet take 1 tablet by mouth once daily 11/26/16   Historical Provider, MD   losartan-hydrochlorothiazide (HYZAAR) 100-25 MG per tablet Take 1 tablet by mouth daily for 90 days 4/5/19 7/4/19  Sai García, MD   omeprazole (PriLOSEC) 20 mg delayed release capsule take 1 capsule by mouth daily at bedtime  Patient not taking: Reported on 2019   Donis Butler MD   [facility] 50 MCG SUSR inject 0 5 milliliter intramuscularly 9/15/18   Historical Provider, MD   tamsulosin (FLOMAX) 0 4 mg  3/2/17   Historical Provider, MD     ---------------------------------------------------------------------------------------------------------------------------------------------------------------------  Vitals:   Vitals:    19 0405 19 0407 19 0408 19 0500   BP:       BP Location:       Pulse: 90 88 88 86   Resp: 16 16 16 14   Temp:    97 9 °F (36 6 °C)   TempSrc:       SpO2: 95% 96% 96% 94%   Weight:       Height:         Arterial Line:  Arterial Line BP: 94/58  Arterial Line MAP (mmHg): 68 mmHg    Tele Rhythm: NSR This was personally reviewed by myself  Respiratory:  SpO2: SpO2: 96 %, SpO2 Activity: SpO2 Activity: At Rest, SpO2 Device: O2 Device: Nasal cannula  O2 Flow Rate (L/min): 6 L/min    Ventilator:  Respiratory    Lab Data (Last 4 hours)    None         O2/Vent Data (Last 4 hours)    None              Temperature: Temp (24hrs), Av 1 °F (36 7 °C), Min:97 2 °F (36 2 °C), Max:98 6 °F (37 °C)  Current: Temperature: 97 9 °F (36 6 °C)    Weights:   Weight (last 2 days)     Date/Time   Weight    12/15/19 2125       Comment rows:    OBSERV: pt extubated per Cain Donaldson by Ki Awad RT at 12/15/19 2125    12/15/19 1941       Comment rows:    OBSERV: pt weaning on vent at 12/15/19 1941    12/15/19 0100   81 6 (179 9)    19   79 4 (175)            Body mass index is 26 57 kg/m²      Hemodynamic Monitoring:  PAP: PAP: /, CVP: CVP (mean): 9 mmHg, CO: CO (L/min): 5 1 L/min, CI: CI (L/min/m2): 2 6 L/min/m2, SVR: SVR (dyne*sec)/cm5: 925 (dyne*sec)/cm5  PAP: (25-40)/(11-27) 25/14  CO:  [4 L/min-7 1 L/min] 5 1 L/min  CI:  [2 L/min/m2-3 6 L/min/m2] 2 6 L/min/m2    Intake and Outputs:    Intake/Output Summary (Last 24 hours) at 12/16/2019 0557  Last data filed at 12/16/2019 0500  Gross per 24 hour   Intake 6384 51 ml   Output 1150 ml   Net 5234 51 ml     I/O last 24 hours: In: 8028 1 [P O :680; I V :4517 1; Blood:481; NG/GT:100; IV Piggyback:2250]  Out: 6755 [Urine:1100; Chest Tube:250]    UOP: 20-30cc/hour   BM: No in the last 24 hours    Chest tube Output:  Mediastinal tubes: 110 mL/8 hours  260 mL/24 hours          Labs:  Results from last 7 days   Lab Units 12/16/19  0356 12/16/19  0001 12/15/19  1619 12/15/19  1607  12/15/19  1354  12/15/19  0354 12/14/19 2005   WBC Thousand/uL 21 18*  --   --   --   --   --   --  11 24* 9 07   HEMOGLOBIN g/dL 11 3* 12 9  --  12 6  --   --   --  12 7 14 8   I STAT HEMOGLOBIN g/dl  --   --  11 9*  --    < >  --    < >  --   --    HEMATOCRIT % 34 4* 39 6  --  38 5  --   --   --  38 0 44 3   HEMATOCRIT, ISTAT %  --   --  35*  --    < >  --    < >  --   --    PLATELETS Thousands/uL 147*  --   --  169  --  77*  --  157 169   NEUTROS PCT %  --   --   --   --   --   --   --   --  70   MONOS PCT %  --   --   --   --   --   --   --   --  10    < > = values in this interval not displayed       Results from last 7 days   Lab Units 12/16/19  0356 12/16/19  0001 12/15/19  1918 12/15/19  1619 12/15/19  1607 12/15/19  1458 12/15/19  1412  12/15/19  0354 12/14/19 2005   SODIUM mmol/L 147*  --   --   --  149*  --   --   --  144 141   POTASSIUM mmol/L 4 1 4 3 4 4  --  4 0  --   --   --  3 8 4 5   CHLORIDE mmol/L 115*  --   --   --  115*  --   --   --  116* 110*   CO2 mmol/L 23  --   --   --  24  --   --   --  24 26   CO2, I-STAT mmol/L  --   --   --  24  --  24 26   < >  --   --    BUN mg/dL 44*  --   --   --  33*  --   --   --  32* 31*   CREATININE mg/dL 2 97*  --   --   --  2 10*  --   --   --  1 70* 1 86*   CALCIUM mg/dL 8 3  --   --   --  8 3  --   --   --  8 4 9 3   ALK PHOS U/L  --   --   --   --   --   --   --   --   --  54   ALT U/L  --   --   --   --   --   --   --   --   --  29 AST U/L  --   --   --   --   --   --   --   --   --  39   GLUCOSE, ISTAT mg/dl  --   --   --  95  --  104 131   < >  --   --     < > = values in this interval not displayed  Results from last 7 days   Lab Units 12/16/19  0356 12/15/19  0354   MAGNESIUM mg/dL 2 8* 1 7          Results from last 7 days   Lab Units 12/15/19  1607 12/15/19  0354   INR  1 75* 1 27*   PTT seconds 41*  --        Results from last 7 days   Lab Units 12/15/19  2040   PH ART  7 320*   PCO2 ART mm Hg 33 1*   PO2 ART mm Hg 114 5   HCO3 ART mmol/L 16 7*   BASE EXC ART mmol/L -8 3   ABG SOURCE  Line, Arterial               Micro:   Blood Culture: No results found for: BLOODCX  Urine Culture:   Lab Results   Component Value Date    URINECX 10,000-19,000 cfu/ml  03/08/2018     Sputum Culture: No components found for: SPUTUMCX  Wound Culure: No results found for: WOUNDCULT    Diagnositic Studies:  12/16/19 CXR: Some pulm vasc  Congestion, possible small right pleural effusion  This was personally reviewed by myself in PACS  12/16/19 EKG: NSR  This was personally reviewed by myself  Nutrition:        Diet Orders   (From admission, onward)             Start     Ordered    12/16/19 0000  Diet Cardiovascular; Cardiac; Fluid Restriction 1800 ML  Diet effective 0500     Question Answer Comment   Diet Type Cardiovascular    Cardiac Cardiac    Other Restriction(s): Fluid Restriction 1800 ML    RD to adjust diet per protocol? Yes        12/15/19 4482                Physical Exam   Constitutional: He is oriented to person, place, and time  He appears well-developed and well-nourished  HENT:   Head: Normocephalic and atraumatic  Eyes: Pupils are equal, round, and reactive to light  EOM are normal    Neck: Normal range of motion  Neck supple  R  IJ CVC and SG   Cardiovascular: Normal rate, regular rhythm, normal heart sounds and intact distal pulses  Exam reveals no friction rub  No murmur heard  Pulmonary/Chest: Effort normal  No stridor  He has no wheezes  He has no rales  NC  Breath sounds CTA throughout   Abdominal: Soft  Hypoactive bowel sounds   Genitourinary:   Genitourinary Comments: Hills in place   Musculoskeletal: He exhibits edema (Trace)  Neurological: He is alert and oriented to person, place, and time  Skin: Skin is warm and dry  Capillary refill takes less than 2 seconds  Invasive lines and devices: Invasive Devices     Central Venous Catheter Line            CVC Central Lines 12/15/19 Triple less than 1 day    Introducer 12/15/19 less than 1 day          Peripheral Intravenous Line            Peripheral IV 12/14/19 Right Antecubital 1 day    Peripheral IV 12/15/19 Right Wrist 1 day    Peripheral IV 12/15/19 Right Antecubital less than 1 day          Arterial Line            Arterial Line 12/15/19 Right Radial less than 1 day          Line            Pacer Wires less than 1 day    Pacer Wires less than 1 day          Drain            Chest Tube 1 Left Mediastinal 24 Fr  less than 1 day    Chest Tube 2 Anterior Mediastinal 32 Fr  less than 1 day    Chest Tube 3 Posterior Mediastinal 32 Fr  less than 1 day    Urethral Catheter Non-latex; Temperature probe less than 1 day              ---------------------------------------------------------------------------------------------------------------------------------------------------------------------  Code Status: Level 1 - Full Code    Counseling / Coordination of Care  Total time spent today 34 minutes  Greater than 50% of total time was spent with the patient and / or family counseling and / or coordination of care  A description of the counseling / coordination of care: physical, ROs, charting      Collaborative bedside rounds performed with cardiac surgery attending and bedside RN      SIGNATURE: Rubia Hopper PA-C  DATE: December 16, 2019  TIME: 5:57 AM

## 2019-12-16 NOTE — UTILIZATION REVIEW
Initial Clinical Review    Admission: Date/Time/Statement: Inpatient Admission Orders (From admission, onward)     Ordered        12/14/19 2354  Inpatient Admission  Once                   Orders Placed This Encounter   Procedures    Inpatient Admission     Standing Status:   Standing     Number of Occurrences:   1     Order Specific Question:   Admitting Physician     Answer:   Aroldo Rutledge [[id number]]     Order Specific Question:   Level of Care     Answer:   Critical Care [15]     Order Specific Question:   Estimated length of stay     Answer:   More than 2 Midnights     Order Specific Question:   Certification     Answer:   I certify that inpatient services are medically necessary for this patient for a duration of greater than two midnights  See H&P and MD Progress Notes for additional information about the patient's course of treatment  ED Arrival Information     Expected Arrival Acuity Means of Arrival Escorted By Service Admission Type    - 12/14/2019 19:53 Urgent Ambulance [facility] Cardiothoracic Surgery Urgent    Arrival Complaint    chest pain:  "I started with chest pain this evening all of a sudden"        Chief Complaint   Patient presents with    Chest Pain     pt was sitting and began with "stabbing" mid sternal chest pain  Denies cardiac HX  reports associated SOB  Reports 8/10 pain post niro and ASA     Assessment/Plan: 70year old male, presented to the ED from home via EMS  Admitted as Inpatient due to 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  Presents with acute onset of chest pain that started around 6:30 p m  Lisa Reyez Patient states that the pain came on suddenly in the middle of his chest and radiated to his central back  He also felt diaphoretic with an episode of vomiting  He underwent CT scan to rule out dissection  CT reported ascending aortic aneurysm measuring 4 6 x 4 4 cm with acute type a intramural hematoma  Cardiothoracic surgery contacted regarding CT findings plan for operative intervention  To OR:  SURGERY DATE: 12/15/2019  PROCEDURE: Replacement of ascending aorta with aggressive hemiarch reconstruction and aortic valve resuspension with a 26 mm Dacron graft     ANESTHESIA: General endotracheal anesthesia with transesophageal echocardiogram guidance  CHEST TUBES;  X 3    PACING WIRES: A x1, v x1       ED Triage Vitals   Temperature Pulse Respirations Blood Pressure SpO2   12/14/19 2002 12/14/19 2002 12/14/19 2002 12/14/19 2015 12/14/19 2002   97 9 °F (36 6 °C) 66 20 (!) 175/78 98 %      Temp Source Heart Rate Source Patient Position - Orthostatic VS BP Location FiO2 (%)   12/14/19 2002 12/14/19 2002 12/14/19 2002 12/14/19 2002 12/15/19 1600   Oral Monitor Sitting Right arm 70      Pain Score       12/14/19 1958       7        Wt Readings from Last 1 Encounters:   12/16/19 83 5 kg (184 lb 1 4 oz)     Additional Vital Signs:   Date/Time  Temp  Pulse  Resp  BP  MAP (mmHg)  Arterial Line BP  MAP  SpO2  O2 Device  CO  CI  Patient Position - Orthostatic VS  CVP (mean)   12/15/19 2300  98 6 °F (37 °C)  94  19      98/62  74 mmHg  95 %    7 1 L/min  3 6 L/min/m2    8 mmHg   12/15/19 2205  98 4 °F (36 9 °C)  90  16      91/58  68 mmHg  95 %          7 mmHg   12/15/19 2200  98 6 °F (37 °C)  90  17      86/58  68 mmHg  95 %    6 3 L/min  3 2 L/min/m2    7 mmHg   12/15/19 2135  98 6 °F (37 °C)  90  19      90/56  68 mmHg  95 %          6 mmHg   12/15/19 2125  98 6 °F (37 °C)  86  14      90/56  68 mmHg  98 %  Nasal cannula        7 mmHg   Comment rows:   OBSERV: pt extubated per Marylen Lutz by Leticia Kwok RT at 12/15/19 2125   12/15/19 2100  98 2 °F (36 8 °C)  90  12      94/60  72 mmHg  97 %    6 8 L/min  3 4 L/min/m2    10 mmHg   12/15/19 2030    86  13      90/54  68 mmHg  97 %          8 mmHg   12/15/19 2015  98 1 °F (36 7 °C)  88  14      91/55  67 mmHg  97 %          8 mmHg   12/15/19 2000  97 7 °F (36 5 °C)  86  15      86/54  66 mmHg  97 %  Ventilator   5 7 L/min  2 9 L/min/m2  Lying  9 mmHg   O2 Device: 8/5 at 12/15/19 2000   12/15/19 1941  97 7 °F (36 5 °C)  86  16      86/54  66 mmHg  97 %  Ventilator         8 mmHg   O2 Device: 5/5 at 12/15/19 1941   Comment rows:   OBSERV: pt weaning on vent at 12/15/19 1941   12/15/19 1939                98 %             12/15/19 1929    86  16      90/56  68 mmHg  97 %          9 mmHg   12/15/19 1900  97 3 °F (36 3 °C)Abnormal   86  17      90/56  68 mmHg  97 %    5 2 L/min  2 6 L/min/m2    9 mmHg   12/15/19 1830    84  18      93/57  71 mmHg  97 %          11 mmHg   12/15/19 1800  97 2 °F (36 2 °C)Abnormal   81  16      98/58  72 mmHg  97 %    4 6 L/min  2 3 L/min/m2    10 mmHg   12/15/19 1745    80  16      90/50  66 mmHg  97 %          9 mmHg   12/15/19 1730    80  16      86/52  64 mmHg  98 %          10 mmHg   12/15/19 1715    78  16      86/54  66 mmHg  98 %          13 mmHg   12/15/19 1700  97 5 °F (36 4 °C)  82  16      96/60  74 mmHg  98 %    4 L/min  2 L/min/m2    16 mmHg   12/15/19 1655    82  23Abnormal       96/56  70 mmHg  97 %          8 mmHg   12/15/19 1645    80  16      92/56  70 mmHg  98 %          10 mmHg   12/15/19 1635    78  16      86/56  68 mmHg  97 %          13 mmHg   12/15/19 1630    78  19      88/56  68 mmHg  97 %          13 mmHg   12/15/19 1620    78  16      96/62  74 mmHg  97 %  Ventilator         18 mmHg   O2 Device: ac 16/450/70/5 at 12/15/19 1620   12/15/19 1615    78  16      96/62  76 mmHg  96 %          19 mmHg   12/15/19 1600  98 1 °F (36 7 °C)  77  13      97/63  76 mmHg  96 %  Ventilator   4 3 L/min  2 2 L/min/m2  Lying  19 mmHg   O2 Device: ac13/450/70/5 at 12/15/19 1600   12/15/19 1558                98 %             12/15/19 0800  98 4 °F (36 9 °C)  70  19  96/57  74  102/56  72 mmHg  96 %            12/15/19 0700    68  18  104/67  78  120/64  82 mmHg  92 %             12/15/19 0600    76  19  110/58  78  108/56  74 mmHg  93 %             12/15/19 0500    76  17  110/69  86  110/52  72 mmHg  94 %             12/15/19 0400  98 °F (36 7 °C)  78  19  105/56  74  106/52  70 mmHg  92 %  None (Room air)      Lying     12/15/19 0300    74  13  97/57  69  104/52  68 mmHg  95 %             12/15/19 0200    80  14  102/62  82  104/52  70 mmHg  94 %             12/15/19 0100  98 3 °F (36 8 °C)  84  27Abnormal   116/75  87  130/54  76 mmHg  97 %  None (Room air)      Lying     12/15/19 0005    84  20  141/65        95 %  None (Room air)      Sitting     12/14/19 2353    84  20  148/74        93 %  None (Room air)      Sitting     12/14/19 2349    86  20  153/73        96 %  None (Room air)      Sitting     12/14/19 2333    80  18  153/81        96 %  None (Room air)      Sitting     12/14/19 2323    82  18  154/85        95 %  None (Room air)      Sitting     12/14/19 2247    82  18  173/96Abnormal         98 %             12/14/19 2238    102  18  162/94        93 %             12/14/19 2229    82  18  149/76        97 %  None (Room air)      Sitting     12/14/19 2225    78  18  165/82        97 %  None (Room air)      Sitting     12/14/19 2217        166/83                     12/14/19 2213    70  17  184/88Abnormal         97 %  None (Room air)      Sitting     12/14/19 2142    76  15  144/72        96 %  None (Room air)      Sitting     12/14/19 2132    80  22  160/84        99 %  None (Room air)      Sitting     12/14/19 2105    64  20  192/101Abnormal         99 %  None (Room air)      Sitting     12/14/19 2015    68  21  175/78Abnormal         98 %               Date and Time Eye Opening Best Verbal Response Best Motor Response Teresa Coma Scale Score   19 1230 4 5 6 15   19 0730 4 5 6 15   19 0400 4 5 6 15   19 0000 4 5 6 15   12/15/19 2135 4 5 6 15   12/15/19 2000 4 1 6 11   12/15/19 1630 3 1 6 10   12/15/19 1615 1 1 1 3   12/15/19 1600 1 1 1 3   12/15/19 0800 4 5 6 15   12/15/19 0700 4 5 6 15   12/15/19 0600 4 5 6 15   12/15/19 0500 4 5 6 15   12/15/19 0400 4 5 6 15   12/15/19 0300 4 5 6 15   12/15/19 0200 4 5 6 15   12/15/19 0100 4 5 6 15     Pertinent Labs/Diagnostic Test Results:   12/15/2019 @ 1048  Chest X:  Cardiomegaly  No acute consolidation or congestion    2019 @ 2241  CTA chest/abd/pel:   1   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 tapering to 4 1 cm at the aortic arch  2  Fayne Footman is crescentic intermediate density along the aortic wall extending from the aortic root through the aortic arch and along the proximal descending thoracic aorta compatible with acute type A intramural hematoma   Emergent vascular consultation is recommended  2019 @ 0813  Chest X:  Status post sternotomy   Lines and support devices appear in position as noted above   Central fullness of the pulmonary vasculature may represent mild pulmonary vascular congestion  2019 @ 1959  EC, Sinus rhythm with occasional Premature ventricular complexes,  Nonspecific T wave abnormality        Results from last 7 days   Lab Units 19  0356 19  0001 12/15/19  1619 12/15/19  1607 12/15/19  1458  12/15/19  1354  12/15/19  0354 19   WBC Thousand/uL 21 18*  --   --   --   --   --   --   --  11 24* 9 07   HEMOGLOBIN g/dL 11 3* 12 9  --  12 6  --   --   --   --  12 7 14 8   I STAT HEMOGLOBIN g/dl  --   --  11 9*  --  10 5*   < >  --    < >  --   --    HEMATOCRIT % 34 4* 39 6  --  38 5  --   --   --   --  38 0 44 3   HEMATOCRIT, ISTAT %  --   --  35*  --  31*   < >  --    < >  --   --    PLATELETS Thousands/uL 147*  --   --  169  --   --  77*  --  157 169   NEUTROS ABS Thousands/µL  --   --   --   --   -- --   --   --   --  6 28    < > = values in this interval not displayed  Results from last 7 days   Lab Units 12/16/19  0356 12/16/19  0001 12/15/19  1918 12/15/19  1619 12/15/19  1607 12/15/19  1458 12/15/19  1412 12/15/19  1342 12/15/19  1312  12/15/19  0354 12/14/19 2005   SODIUM mmol/L 147*  --   --   --  149*  --   --   --   --   --  144 141   POTASSIUM mmol/L 4 1 4 3 4 4  --  4 0  --   --   --   --   --  3 8 4 5   CHLORIDE mmol/L 115*  --   --   --  115*  --   --   --   --   --  116* 110*   CO2 mmol/L 23  --   --   --  24  --   --   --   --   --  24 26   CO2, I-STAT mmol/L  --   --   --  24  --  24 26 26 29   < >  --   --    ANION GAP mmol/L 9  --   --   --  10  --   --   --   --   --  4 5   BUN mg/dL 44*  --   --   --  33*  --   --   --   --   --  32* 31*   CREATININE mg/dL 2 97*  --   --   --  2 10*  --   --   --   --   --  1 70* 1 86*   EGFR ml/min/1 73sq m 19  --   --   --  29  --   --   --   --   --  38 34   CALCIUM mg/dL 8 3  --   --   --  8 3  --   --   --   --   --  8 4 9 3   CALCIUM, IONIZED, ISTAT mmol/L  --   --   --  1 23  --  1 05* 1 22 0 94* 1 01*   < >  --   --    MAGNESIUM mg/dL 2 8*  --   --   --   --   --   --   --   --   --  1 7  --     < > = values in this interval not displayed       Results from last 7 days   Lab Units 12/14/19 2005   AST U/L 39   ALT U/L 29   ALK PHOS U/L 54   TOTAL PROTEIN g/dL 7 5   ALBUMIN g/dL 3 1*   TOTAL BILIRUBIN mg/dL 0 53     Results from last 7 days   Lab Units 12/16/19  1057 12/16/19  0751 12/16/19  0557 12/16/19  0359 12/16/19  0143 12/16/19  0008 12/15/19  2206 12/15/19  1959 12/15/19  1753 12/15/19  1606   POC GLUCOSE mg/dl 183* 132 145* 158* 173* 192* 194* 166* 148* 90     Results from last 7 days   Lab Units 12/16/19  0356 12/15/19  1607 12/15/19  0354 12/14/19  2005   GLUCOSE RANDOM mg/dL 162* 101 99 162*      Results from last 7 days   Lab Units 12/15/19  2040   PH ART  7 320*   PCO2 ART mm Hg 33 1*   PO2 ART mm Hg 114 5   HCO3 ART mmol/L 16 7* BASE EXC ART mmol/L -8 3   O2 CONTENT ART mL/dL 18 6   O2 HGB, ARTERIAL % 96 7   ABG SOURCE  Line, Arterial     Results from last 7 days   Lab Units 12/15/19  1619 12/15/19  1458 12/15/19  1412  12/15/19  1207  12/15/19  0908   PH, HARJEET I-STAT   --   --   --   --  7 327  --  7 347   PCO2, HARJEET ISTAT mm HG  --   --   --   --  51 8*  --  42 8   PO2, HARJEET ISTAT mm HG  --   --   --   --  60 0*  --  64 0*   HCO3, HARJEET ISTAT mmol/L  --   --   --   --  27 2  --  23 5*   I STAT BASE EXC mmol/L -4* -3* 0   < > 1   < > -2   I STAT O2 SAT % 96* 100* 100*   < > 88*   < > 91*   ISTAT PH ART  7 296* 7 326* 7 384   < >  --    < >  --    I STAT ART PCO2 mm HG 47 1* 43 6 42 1   < >  --    < >  --    I STAT ART PO2 mm HG 93 0 317 0* 385 0*   < >  --    < >  --    I STAT ART HCO3 mmol/L 23 0 22 8 25 1   < >  --    < >  --     < > = values in this interval not displayed       Results from last 7 days   Lab Units 12/14/19  2322 12/14/19  2005   TROPONIN I ng/mL <0 02 0 03     Results from last 7 days   Lab Units 12/15/19  1607 12/15/19  0354   PROTIME seconds 19 9* 15 5*   INR  1 75* 1 27*   PTT seconds 41*  --      ED Treatment:   Medication Administration from 12/14/2019 1953 to 12/15/2019 0026       Date/Time Order Dose Route Action     12/14/2019 2059 sodium chloride 0 9 % bolus 1,000 mL 1,000 mL Intravenous New Bag     12/14/2019 2355 nitroGLYcerin (TRIDIL) 50 mg in 250 mL infusion (premix) 115 mcg/min Intravenous Rate/Dose Change     12/14/2019 2350 nitroGLYcerin (TRIDIL) 50 mg in 250 mL infusion (premix) 110 mcg/min Intravenous Rate/Dose Change     12/14/2019 2345 nitroGLYcerin (TRIDIL) 50 mg in 250 mL infusion (premix) 105 mcg/min Intravenous Rate/Dose Change     12/14/2019 2330 nitroGLYcerin (TRIDIL) 50 mg in 250 mL infusion (premix) 100 mcg/min Intravenous Rate/Dose Change     12/14/2019 2315 nitroGLYcerin (TRIDIL) 50 mg in 250 mL infusion (premix) 95 mcg/min Intravenous Rate/Dose Change     12/14/2019 2250 nitroGLYcerin (TRIDIL) 50 mg in 250 mL infusion (premix) 80 mcg/min Intravenous Rate/Dose Change     12/14/2019 2240 nitroGLYcerin (TRIDIL) 50 mg in 250 mL infusion (premix) 75 mcg/min Intravenous Rate/Dose Change     12/14/2019 2225 nitroGLYcerin (TRIDIL) 50 mg in 250 mL infusion (premix) 70 mcg/min Intravenous Rate/Dose Change     12/14/2019 2220 nitroGLYcerin (TRIDIL) 50 mg in 250 mL infusion (premix) 65 mcg/min Intravenous Rate/Dose Change     12/14/2019 2215 nitroGLYcerin (TRIDIL) 50 mg in 250 mL infusion (premix) 60 mcg/min Intravenous Rate/Dose Change     12/14/2019 2210 nitroGLYcerin (TRIDIL) 50 mg in 250 mL infusion (premix) 55 mcg/min Intravenous Rate/Dose Change     12/14/2019 2205 nitroGLYcerin (TRIDIL) 50 mg in 250 mL infusion (premix) 50 mcg/min Intravenous Rate/Dose Change     12/14/2019 2140 nitroGLYcerin (TRIDIL) 50 mg in 250 mL infusion (premix) 45 mcg/min Intravenous Rate/Dose Change     12/14/2019 2135 nitroGLYcerin (TRIDIL) 50 mg in 250 mL infusion (premix) 40 mcg/min Intravenous Rate/Dose Change     12/14/2019 2130 nitroGLYcerin (TRIDIL) 50 mg in 250 mL infusion (premix) 35 mcg/min Intravenous Rate/Dose Change     12/14/2019 2125 nitroGLYcerin (TRIDIL) 50 mg in 250 mL infusion (premix) 30 mcg/min Intravenous Rate/Dose Change     12/14/2019 2120 nitroGLYcerin (TRIDIL) 50 mg in 250 mL infusion (premix) 25 mcg/min Intravenous Rate/Dose Change     12/14/2019 2115 nitroGLYcerin (TRIDIL) 50 mg in 250 mL infusion (premix) 20 mcg/min Intravenous New Bag     12/14/2019 2114 fentanyl citrate (PF) 100 MCG/2ML 50 mcg 50 mcg Intravenous Given     12/14/2019 2154 iodixanol (VISIPAQUE) 320 MG/ML injection 100 mL 100 mL Intravenous Given     12/14/2019 2211 fentanyl citrate (PF) 100 MCG/2ML 50 mcg 50 mcg Intravenous Given     12/14/2019 7722 Labetalol HCl (NORMODYNE) injection 10 mg 10 mg Intravenous Given     12/15/2019 0010 esmolol (BREVIBLOC) 2500 mg/250 mL IV infusion (premix) 175 mcg/kg/min Intravenous Rate/Dose Change 12/14/2019 2355 esmolol (BREVIBLOC) 2500 mg/250 mL IV infusion (premix) 150 mcg/kg/min Intravenous Rate/Dose Change     12/14/2019 2345 esmolol (BREVIBLOC) 2500 mg/250 mL IV infusion (premix) 125 mcg/kg/min Intravenous Rate/Dose Change     12/14/2019 2335 esmolol (BREVIBLOC) 2500 mg/250 mL IV infusion (premix) 100 mcg/kg/min Intravenous Rate/Dose Change     12/14/2019 2325 esmolol (BREVIBLOC) 2500 mg/250 mL IV infusion (premix) 75 mcg/kg/min Intravenous Rate/Dose Change     12/14/2019 2315 esmolol (BREVIBLOC) 2500 mg/250 mL IV infusion (premix) 50 mcg/kg/min Intravenous New Bag     12/15/2019 0014 niCARdipine (CARDENE) 25 mg (STANDARD CONCENTRATION) in sodium chloride 0 9% 250 mL 5 mg/hr Intravenous New Bag        Past Medical History:   Diagnosis Date    Arthritis     BPH without urinary obstruction     CKD (chronic kidney disease), stage III (HCC)     baseline 1 6-1 7    GERD (gastroesophageal reflux disease)     Hyperlipidemia     Hypertension      Present on Admission:   Dissection of thoracic aorta (HCC)   Benign hypertension with CKD (chronic kidney disease) stage III (HCC)   Benign essential hypertension   Abnormal TSH   Benign prostatic hyperplasia without lower urinary tract symptoms    Admitting Diagnosis: Chest pain [R07 9]  Age/Sex: 70 y o  male  Admission Orders:  Scheduled Medications:  Medications:  amiodarone 200 mg Oral Q8H [facility]   aspirin 325 mg Oral Daily   atorvastatin 80 mg Oral Daily With Dinner   chlorhexidine 15 mL Swish & Spit BID   fondaparinux 2 5 mg Subcutaneous Daily   insulin lispro 1-5 Units Subcutaneous TID AC   insulin lispro 1-5 Units Subcutaneous HS   metoprolol tartrate 12 5 mg Oral Q12H [facility]   mupirocin 1 application Nasal A73L FILIBERTO   pantoprazole 40 mg Oral Daily   polyethylene glycol 17 g Oral Daily   potassium chloride 20 mEq Oral BID   Continuous IV Infusions:  furosemide 20 mg/hr Intravenous Continuous   norepinephrine 1-30 mcg/min Intravenous Titrated   PRN Meds:  acetaminophen 650 mg Oral Q4H PRN   bisacodyl 10 mg Rectal Daily PRN   lactated ringers 500 mL Intravenous Q30 Min PRN   ondansetron 4 mg Intravenous Q6H PRN   oxyCODONE-acetaminophen 1 tablet Oral Q4H PRN   oxyCODONE-acetaminophen 2 tablet Oral Q6H PRN   potassium chloride 20 mEq Intravenous Once PRN   potassium chloride 20 mEq Intravenous Q1H PRN   potassium chloride 20 mEq Intravenous Q30 Min PRN   Neurovascular checks q8h  Chest tube  Dariel SCDs  IP CONSULT TO MEDICAL CRITICAL CARE  IP CONSULT TO CASE MANAGEMENT  IP CONSULT TO [address] [place] [address] Utilization Review Department  [email or web address]  ATTENTION: Please call with any questions or concerns to [phone] and carefully listen to the prompts so that you are directed to the right person  All voicemails are confidential   Libia Unger all requests for admission clinical reviews, approved or denied determinations and any other requests to dedicated fax number below belonging to the campus where the patient is receiving treatment   List of dedicated fax numbers for the Facilities:  [address] DENIALS (Administrative/Medical Necessity) [phone]   [address] (Maternity/NICU/Pediatrics) [phone]   [facility] [phone]   Emanuel Mariano [phone]   Alicia Mullins [phone]   Yisel Gaytan [phone]   [address] [facility] [address] [place] [address] [phone]   [facility]  [phone]   [address] [facility], [address]  [address] [facility] [address] [facility] [phone]

## 2019-12-17 LAB
ANION GAP SERPL CALCULATED.3IONS-SCNC: 10 MMOL/L (ref 4–13)
ANION GAP SERPL CALCULATED.3IONS-SCNC: 10 MMOL/L (ref 4–13)
ANION GAP SERPL CALCULATED.3IONS-SCNC: 11 MMOL/L (ref 4–13)
ATRIAL RATE: 75 BPM
BASOPHILS # BLD AUTO: 0.02 THOUSANDS/ΜL (ref 0–0.1)
BASOPHILS NFR BLD AUTO: 0 % (ref 0–1)
BUN SERPL-MCNC: 65 MG/DL (ref 5–25)
BUN SERPL-MCNC: 71 MG/DL (ref 5–25)
BUN SERPL-MCNC: 79 MG/DL (ref 5–25)
CALCIUM SERPL-MCNC: 7.6 MG/DL (ref 8.3–10.1)
CALCIUM SERPL-MCNC: 7.7 MG/DL (ref 8.3–10.1)
CALCIUM SERPL-MCNC: 7.9 MG/DL (ref 8.3–10.1)
CHLORIDE SERPL-SCNC: 108 MMOL/L (ref 100–108)
CHLORIDE SERPL-SCNC: 110 MMOL/L (ref 100–108)
CHLORIDE SERPL-SCNC: 112 MMOL/L (ref 100–108)
CO2 SERPL-SCNC: 23 MMOL/L (ref 21–32)
CO2 SERPL-SCNC: 23 MMOL/L (ref 21–32)
CO2 SERPL-SCNC: 24 MMOL/L (ref 21–32)
CREAT SERPL-MCNC: 3.95 MG/DL (ref 0.6–1.3)
CREAT SERPL-MCNC: 3.97 MG/DL (ref 0.6–1.3)
CREAT SERPL-MCNC: 4.09 MG/DL (ref 0.6–1.3)
EOSINOPHIL # BLD AUTO: 0 THOUSAND/ΜL (ref 0–0.61)
EOSINOPHIL NFR BLD AUTO: 0 % (ref 0–6)
ERYTHROCYTE [DISTWIDTH] IN BLOOD BY AUTOMATED COUNT: 14.4 % (ref 11.6–15.1)
GFR SERPL CREATININE-BSD FRML MDRD: 13 ML/MIN/1.73SQ M
GFR SERPL CREATININE-BSD FRML MDRD: 14 ML/MIN/1.73SQ M
GFR SERPL CREATININE-BSD FRML MDRD: 14 ML/MIN/1.73SQ M
GLUCOSE SERPL-MCNC: 145 MG/DL (ref 65–140)
GLUCOSE SERPL-MCNC: 147 MG/DL (ref 65–140)
GLUCOSE SERPL-MCNC: 153 MG/DL (ref 65–140)
GLUCOSE SERPL-MCNC: 154 MG/DL (ref 65–140)
GLUCOSE SERPL-MCNC: 171 MG/DL (ref 65–140)
GLUCOSE SERPL-MCNC: 179 MG/DL (ref 65–140)
GLUCOSE SERPL-MCNC: 182 MG/DL (ref 65–140)
HCT VFR BLD AUTO: 31.5 % (ref 36.5–49.3)
HGB BLD-MCNC: 10.3 G/DL (ref 12–17)
IMM GRANULOCYTES # BLD AUTO: 0.21 THOUSAND/UL (ref 0–0.2)
IMM GRANULOCYTES NFR BLD AUTO: 1 % (ref 0–2)
INR PPP: 1.48 (ref 0.84–1.19)
LYMPHOCYTES # BLD AUTO: 0.74 THOUSANDS/ΜL (ref 0.6–4.47)
LYMPHOCYTES NFR BLD AUTO: 4 % (ref 14–44)
MAGNESIUM SERPL-MCNC: 2.8 MG/DL (ref 1.6–2.6)
MCH RBC QN AUTO: 30.4 PG (ref 26.8–34.3)
MCHC RBC AUTO-ENTMCNC: 32.7 G/DL (ref 31.4–37.4)
MCV RBC AUTO: 93 FL (ref 82–98)
MONOCYTES # BLD AUTO: 1.25 THOUSAND/ΜL (ref 0.17–1.22)
MONOCYTES NFR BLD AUTO: 6 % (ref 4–12)
NEUTROPHILS # BLD AUTO: 17.5 THOUSANDS/ΜL (ref 1.85–7.62)
NEUTS SEG NFR BLD AUTO: 89 % (ref 43–75)
NRBC BLD AUTO-RTO: 0 /100 WBCS
P AXIS: 94 DEGREES
PLATELET # BLD AUTO: 86 THOUSANDS/UL (ref 149–390)
PMV BLD AUTO: 11.3 FL (ref 8.9–12.7)
POTASSIUM SERPL-SCNC: 4 MMOL/L (ref 3.5–5.3)
POTASSIUM SERPL-SCNC: 4.1 MMOL/L (ref 3.5–5.3)
POTASSIUM SERPL-SCNC: 4.3 MMOL/L (ref 3.5–5.3)
PR INTERVAL: 179 MS
PROTHROMBIN TIME: 17.5 SECONDS (ref 11.6–14.5)
QRS AXIS: 57 DEGREES
QRSD INTERVAL: 88 MS
QT INTERVAL: 367 MS
QTC INTERVAL: 410 MS
RBC # BLD AUTO: 3.39 MILLION/UL (ref 3.88–5.62)
SODIUM SERPL-SCNC: 142 MMOL/L (ref 136–145)
SODIUM SERPL-SCNC: 144 MMOL/L (ref 136–145)
SODIUM SERPL-SCNC: 145 MMOL/L (ref 136–145)
T WAVE AXIS: -24 DEGREES
VENTRICULAR RATE: 75 BPM
WBC # BLD AUTO: 19.72 THOUSAND/UL (ref 4.31–10.16)

## 2019-12-17 PROCEDURE — 82948 REAGENT STRIP/BLOOD GLUCOSE: CPT

## 2019-12-17 PROCEDURE — 85610 PROTHROMBIN TIME: CPT | Performed by: PHYSICIAN ASSISTANT

## 2019-12-17 PROCEDURE — 93010 ELECTROCARDIOGRAM REPORT: CPT | Performed by: INTERNAL MEDICINE

## 2019-12-17 PROCEDURE — 97535 SELF CARE MNGMENT TRAINING: CPT

## 2019-12-17 PROCEDURE — 99232 SBSQ HOSP IP/OBS MODERATE 35: CPT | Performed by: STUDENT IN AN ORGANIZED HEALTH CARE EDUCATION/TRAINING PROGRAM

## 2019-12-17 PROCEDURE — 99222 1ST HOSP IP/OBS MODERATE 55: CPT | Performed by: INTERNAL MEDICINE

## 2019-12-17 PROCEDURE — 83735 ASSAY OF MAGNESIUM: CPT | Performed by: PHYSICIAN ASSISTANT

## 2019-12-17 PROCEDURE — 80048 BASIC METABOLIC PNL TOTAL CA: CPT | Performed by: PHYSICIAN ASSISTANT

## 2019-12-17 PROCEDURE — 93005 ELECTROCARDIOGRAM TRACING: CPT

## 2019-12-17 PROCEDURE — 85025 COMPLETE CBC W/AUTO DIFF WBC: CPT | Performed by: PHYSICIAN ASSISTANT

## 2019-12-17 PROCEDURE — 97530 THERAPEUTIC ACTIVITIES: CPT

## 2019-12-17 PROCEDURE — NC001 PR NO CHARGE: Performed by: STUDENT IN AN ORGANIZED HEALTH CARE EDUCATION/TRAINING PROGRAM

## 2019-12-17 PROCEDURE — 99024 POSTOP FOLLOW-UP VISIT: CPT | Performed by: THORACIC SURGERY (CARDIOTHORACIC VASCULAR SURGERY)

## 2019-12-17 RX ORDER — TEMAZEPAM 15 MG/1
15 CAPSULE ORAL
Status: DISCONTINUED | OUTPATIENT
Start: 2019-12-17 | End: 2020-01-07 | Stop reason: HOSPADM

## 2019-12-17 RX ORDER — DOCUSATE SODIUM 100 MG/1
100 CAPSULE, LIQUID FILLED ORAL 2 TIMES DAILY
Status: DISCONTINUED | OUTPATIENT
Start: 2019-12-17 | End: 2019-12-25

## 2019-12-17 RX ADMIN — DOCUSATE SODIUM 100 MG: 100 CAPSULE, LIQUID FILLED ORAL at 17:17

## 2019-12-17 RX ADMIN — AMIODARONE HYDROCHLORIDE 200 MG: 200 TABLET ORAL at 14:15

## 2019-12-17 RX ADMIN — Medication 30 MG/HR: at 09:14

## 2019-12-17 RX ADMIN — ASPIRIN 325 MG ORAL TABLET 325 MG: 325 PILL ORAL at 08:04

## 2019-12-17 RX ADMIN — MUPIROCIN 1 APPLICATION: 20 OINTMENT TOPICAL at 21:58

## 2019-12-17 RX ADMIN — AMIODARONE HYDROCHLORIDE 200 MG: 200 TABLET ORAL at 21:58

## 2019-12-17 RX ADMIN — INSULIN LISPRO 1 UNITS: 100 INJECTION, SOLUTION INTRAVENOUS; SUBCUTANEOUS at 08:04

## 2019-12-17 RX ADMIN — POLYETHYLENE GLYCOL 3350 17 G: 17 POWDER, FOR SOLUTION ORAL at 08:04

## 2019-12-17 RX ADMIN — ATORVASTATIN CALCIUM 80 MG: 80 TABLET, FILM COATED ORAL at 16:35

## 2019-12-17 RX ADMIN — INSULIN LISPRO 1 UNITS: 100 INJECTION, SOLUTION INTRAVENOUS; SUBCUTANEOUS at 21:54

## 2019-12-17 RX ADMIN — MUPIROCIN 1 APPLICATION: 20 OINTMENT TOPICAL at 08:04

## 2019-12-17 RX ADMIN — PANTOPRAZOLE SODIUM 40 MG: 40 TABLET, DELAYED RELEASE ORAL at 08:04

## 2019-12-17 RX ADMIN — HEPARIN SODIUM 5000 UNITS: 5000 INJECTION INTRAVENOUS; SUBCUTANEOUS at 21:59

## 2019-12-17 RX ADMIN — DOCUSATE SODIUM 100 MG: 100 CAPSULE, LIQUID FILLED ORAL at 11:55

## 2019-12-17 RX ADMIN — METOPROLOL TARTRATE 12.5 MG: 25 TABLET ORAL at 21:55

## 2019-12-17 RX ADMIN — METOPROLOL TARTRATE 12.5 MG: 25 TABLET ORAL at 08:04

## 2019-12-17 RX ADMIN — INSULIN LISPRO 1 UNITS: 100 INJECTION, SOLUTION INTRAVENOUS; SUBCUTANEOUS at 10:53

## 2019-12-17 RX ADMIN — CHLORHEXIDINE GLUCONATE 0.12% ORAL RINSE 15 ML: 1.2 LIQUID ORAL at 08:04

## 2019-12-17 RX ADMIN — HEPARIN SODIUM 5000 UNITS: 5000 INJECTION INTRAVENOUS; SUBCUTANEOUS at 14:17

## 2019-12-17 RX ADMIN — Medication 30 MG/HR: at 22:40

## 2019-12-17 RX ADMIN — POTASSIUM CHLORIDE 20 MEQ: 1500 TABLET, EXTENDED RELEASE ORAL at 08:04

## 2019-12-17 RX ADMIN — HEPARIN SODIUM 5000 UNITS: 5000 INJECTION INTRAVENOUS; SUBCUTANEOUS at 05:49

## 2019-12-17 NOTE — PROCEDURES
Procedure: Chest Tube Removal    12/17/19    Mediastinal CT x 3 d/c'd in typical fashion  Patient tolerated procedure well  No immediate complications  Site dressed with Acticoat  Patient's nurse was made aware  Procedure: Epicardial Wire Removal    12/17/19    Patient was returned to bed  EPW x 2 d/c'd in typical fashion  No immediate complications  Site dressed with dry sterile dressing  Patient and nurse aware of mandatory 1 hour bedrest protocol  Vital signs ordered Q 15 minutes for one hour as per protocol      Veronica Mondragon PA-C

## 2019-12-17 NOTE — CONSULTS
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 with hemiarch reconstruction and aortic valve resuspension  POD #2  Weaned off pressors and supplemental O2  Transferred to stepdown bed  Post op rhythm management- amiodarone 200 mg TID  Tele reviewed- NSR with PACs  Volume management with Lasix gtt  ASA, statin, metoprolol tartrate 12 5 mg BID  LVEF 65% on intra op DYLAN  Feeling very well post-operatively, ambulating without symptoms and tolerating diet  2  HTN- stable off pressors, average /63  3  AFSHIN on CKD stage 3- creatinine 4 09  Baseline closer to 1 7-2 0     4  Hyperlipidemia- on statin    History of Present Illness   HPI: Lari Cogan is a 70y o  year old male with HTN, CKD stage 3, GERD, hyperlipidemia, and BPH who presented to the ED with acute onset of chest pain on 12/14/2019  He stated the pain came on suddenly in the middle of his chest and radiated to his central back which was associated with diaphoresis and episode of vomiting  CTA showed an acute type A dissection with intramural hematoma  He was started on esmolol and Cardene infusions for control of heart rate and blood pressure  He was taken to the OR by CTS (Dr Padilla Seen) early on 12/15/2019 for repair of this dissection  He initially recovered in the ICU while he was weaned off pressors and was stable enough to be transferred out today  He remains on a lasix gtt with improved urine output but renal function remains elevated above baseline   Cardiology has been consulted as part of routine post-op management  EKG reviewed personally: NSR with nonspecific T wave abnormality  Telemetry reviewed personally: NSR with PACs    Review of Systems   Constitution: Negative for diaphoresis and malaise/fatigue  Cardiovascular: Negative for chest pain, dyspnea on exertion, leg swelling, orthopnea and palpitations  Respiratory: Negative for cough, shortness of breath, sleep disturbances due to breathing and sputum production  Gastrointestinal: Negative for bloating and nausea  Neurological: Negative for dizziness, light-headedness and weakness  All other systems reviewed and are negative  Historical Information   Past Medical History:   Diagnosis Date    Arthritis     BPH without urinary obstruction     CKD (chronic kidney disease), stage III (HCC)     baseline 1 6-1 7    GERD (gastroesophageal reflux disease)     Hyperlipidemia     Hypertension      Past Surgical History:   Procedure Laterality Date    APPENDECTOMY      COLONOSCOPY  2017    FRACTURE SURGERY      PA ASCEND AORTA GRAFT W ROOT REPLACMENT  VALVE CONDUIT/CORON RECONSTRUCT N/A 12/15/2019    Procedure: BENTALL PROCEDURE (ASCENDING AORTIC REPAIR) with 26mm Gelweave Graft;  Surgeon: Kathryn Bass DO;  Location: [facility];  Service: Cardiac Surgery    PA RECONSTR TOTAL SHOULDER IMPLANT Right 2017    Procedure: ARTHROPLASTY SHOULDER REVERSE with OPEN CYST EXCISION;  Surgeon: Davis Price MD;  Location: [facility];  Service: Orthopedics    SHOULDER SURGERY      WRIST SURGERY       Social History     Substance and Sexual Activity   Alcohol Use Not Currently     Social History     Substance and Sexual Activity   Drug Use Not Currently     Social History     Tobacco Use   Smoking Status Former Smoker    Packs/day: 1 00    Last attempt to quit: Garcia Quintero Years since quittin 9   Smokeless Tobacco Never Used     Family History:   Family History   Problem Relation Age of Onset    No Known Problems Mother     Hypertension Father     Arthritis Family      Meds/Allergies   all current active meds have been reviewed and current meds:   Current Facility-Administered Medications   Medication Dose Route Frequency    acetaminophen (TYLENOL) tablet 650 mg  650 mg Oral Q4H PRN    amiodarone tablet 200 mg  200 mg Oral Q8H [facility]    aspirin tablet 325 mg  325 mg Oral Daily    atorvastatin (LIPITOR) tablet 80 mg  80 mg Oral Daily With Dinner    bisacodyl (DULCOLAX) rectal suppository 10 mg  10 mg Rectal Daily PRN    docusate sodium (COLACE) capsule 100 mg  100 mg Oral BID    furosemide (LASIX) 500 mg infusion 50 mL  30 mg/hr Intravenous Continuous    heparin (porcine) subcutaneous injection 5,000 Units  5,000 Units Subcutaneous Q8H [facility]    insulin lispro (HumaLOG) 100 units/mL subcutaneous injection 1-5 Units  1-5 Units Subcutaneous TID AC    insulin lispro (HumaLOG) 100 units/mL subcutaneous injection 1-5 Units  1-5 Units Subcutaneous HS    metoprolol tartrate (LOPRESSOR) partial tablet 12 5 mg  12 5 mg Oral Q12H FILIBERTO    mupirocin (BACTROBAN) 2 % nasal ointment 1 application  1 application Nasal D44P [facility]    ondansetron (ZOFRAN) injection 4 mg  4 mg Intravenous Q6H PRN    oxyCODONE-acetaminophen (PERCOCET) 5-325 mg per tablet 1 tablet  1 tablet Oral Q4H PRN    oxyCODONE-acetaminophen (PERCOCET) 5-325 mg per tablet 2 tablet  2 tablet Oral Q6H PRN    pantoprazole (PROTONIX) EC tablet 40 mg  40 mg Oral Daily    polyethylene glycol (MIRALAX) packet 17 g  17 g Oral Daily    temazepam (RESTORIL) capsule 15 mg  15 mg Oral HS PRN       furosemide 30 mg/hr Last Rate: 30 mg/hr (12/17/19 0914)     No Known Allergies    Objective   Vitals: Blood pressure 129/80, pulse 78, temperature 98 1 °F (36 7 °C), temperature source Oral, resp  rate 18, height 5' 9" (1 753 m), weight 86 8 kg (191 lb 5 8 oz), SpO2 95 %  ,     Body mass index is 28 26 kg/m²  ,     Systolic (89YSC), FNE:858 , Min:85 , QJV:650     Diastolic (63DAD), SZW:48, Min:51, Max:80    Intake/Output Summary (Last 24 hours) at 12/17/2019 1502  Last data filed at 12/17/2019 1401  Gross per 24 hour   Intake 841 ml   Output 2300 ml   Net -1459 ml     Weight (last 2 days)     Date/Time   Weight    12/17/19 0544   86 8 (191 36)    12/16/19 0600   83 5 (184 08)    12/15/19 2125       Comment rows:    OBSERV: pt extubated per [place] by Leticia Kwok RT at 12/15/19 2125    12/15/19 1941       Comment rows:    OBSERV: pt weaning on vent at 12/15/19 1941    12/15/19 0100   81 6 (179 9)            Invasive Devices     Central Venous Catheter Line            CVC Central Lines 12/15/19 Triple 2 days          Peripheral Intravenous Line            Peripheral IV 12/14/19 Right Antecubital 2 days    Peripheral IV 12/15/19 Right Antecubital 2 days    Peripheral IV 12/15/19 Right Wrist 2 days          Line            Pacer Wires 2 days    Pacer Wires 2 days          Drain            Chest Tube 1 Left Mediastinal 24 Fr  2 days    Chest Tube 2 Anterior Mediastinal 32 Fr  2 days    Chest Tube 3 Posterior Mediastinal 32 Fr  2 days    Urethral Catheter Non-latex; Temperature probe 2 days              Physical Exam   Constitutional: He is oriented to person, place, and time  No distress  Neck: Neck supple  No hepatojugular reflux and no JVD present  Cardiovascular: Normal rate, regular rhythm, normal heart sounds and intact distal pulses  Exam reveals no gallop and no friction rub  No murmur heard  Pulmonary/Chest: Effort normal  No respiratory distress  Bibasilar crackles, room air  Abdominal: Soft  Bowel sounds are normal  He exhibits no distension  Musculoskeletal: Normal range of motion  He exhibits no edema  Neurological: He is alert and oriented to person, place, and time  Skin: Skin is warm and dry  He is not diaphoretic  No erythema  Mid-sternal incision dressing C/D/I  Psychiatric: He has a normal mood and affect       LABORATORY RESULTS:  Results from last 7 days   Lab Units 12/14/19  0461 12/14/19 2005   TROPONIN I ng/mL <0 02 0 03     CBC with diff: Results from last 7 days   Lab Units 12/17/19  0433 12/16/19  0356 12/16/19  0001 12/15/19  1619 12/15/19  1607 12/15/19  1458 12/15/19  1412 12/15/19  1354  12/15/19  0354 12/14/19 2005   WBC Thousand/uL 19 72* 21 18*  --   --   --   --   --   --   --  11 24* 9 07   HEMOGLOBIN g/dL 10 3* 11 3* 12 9  --  12 6  --   --   --   --  12 7 14 8   I STAT HEMOGLOBIN g/dl  --   --   --  11 9*  --  10 5* 10 2*  --    < >  --   --    HEMATOCRIT % 31 5* 34 4* 39 6  --  38 5  --   --   --   --  38 0 44 3   HEMATOCRIT, ISTAT %  --   --   --  35*  --  31* 30*  --    < >  --   --    MCV fL 93 93  --   --   --   --   --   --   --  91 92   PLATELETS Thousands/uL 86* 147*  --   --  169  --   --  77*  --  157 169   MCH pg 30 4 30 5  --   --   --   --   --   --   --  30 4 30 6   MCHC g/dL 32 7 32 8  --   --   --   --   --   --   --  33 4 33 4   RDW % 14 4 13 9  --   --   --   --   --   --   --  13 2 13 3   MPV fL 11 3 10 3  --   --  10 3  --   --  10 1  --  10 0 10 4   NRBC AUTO /100 WBCs 0  --   --   --   --   --   --   --   --   --  0    < > = values in this interval not displayed         CMP:  Results from last 7 days   Lab Units 12/17/19  0813 12/17/19 0433 12/17/19  0020 12/16/19  1557 12/16/19 0356 12/16/19  0001 12/15/19  1918 12/15/19  1619 12/15/19  1607 12/15/19  1458 12/15/19  1412 12/15/19  1342 12/15/19  1312 12/15/19  1230 12/15/19  1207  12/15/19  0354 12/14/19 2005   POTASSIUM mmol/L 4 0 4 1 4 3 3 9 4 1 4 3 4 4  --  4 0  --   --   --   --   --   --   --  3 8 4 5   CHLORIDE mmol/L 108 110* 112* 114* 115*  --   --   --  115*  --   --   --   --   --   --   --  116* 110*   CO2 mmol/L 23 24 23 20* 23  --   --   --  24  --   --   --   --   --   --   --  24 26   CO2, I-STAT mmol/L  --   --   --   --   --   --   --  24  --  24 26 26 29 26 29   < >  --   --    BUN mg/dL 79* 71* 65* 54* 44*  --   --   --  33*  --   --   --   --   --   --   --  32* 31*   CREATININE mg/dL 4 09* 3 97* 3 95* 3 38* 2 97*  --   --   --  2 10*  --   --   --   --   --   --   --  1 70* 1 86*   GLUCOSE, ISTAT mg/dl  --   --   --   --   --   --   --  95  --  104 131 155* 164* 106 109   < >  --   --    CALCIUM mg/dL 7 7* 7 9* 7 6* 7 8* 8 3  --   --   --  8 3  --   --   --   --   --   --   --  8 4 9 3   AST U/L  --   --   --   --   --   --   --   --   --   --   --   --   --   --   --   --   --  39   ALT U/L  --   --   --   --   --   --   --   --   --   --   --   --   --   --   --   --   --  29   ALK PHOS U/L  --   --   --   --   --   --   --   --   --   --   --   --   --   --   --   --   --  54   EGFR ml/min/1 73sq m 13 14 14 17 19  --   --   --  29  --   --   --   --   --   --   --  38 34    < > = values in this interval not displayed  BMP:  Results from last 7 days   Lab Units 12/17/19  0813 12/17/19  0433 12/17/19  0020 12/16/19  1557 12/16/19  0356 12/16/19  0001 12/15/19  1918 12/15/19  1619 12/15/19  1607  12/15/19  0354   POTASSIUM mmol/L 4 0 4 1 4 3 3 9 4 1 4 3 4 4  --  4 0  --  3 8   CHLORIDE mmol/L 108 110* 112* 114* 115*  --   --   --  115*  --  116*   CO2 mmol/L 23 24 23 20* 23  --   --   --  24  --  24   CO2, I-STAT mmol/L  --   --   --   --   --   --   --  24  --    < >  --    BUN mg/dL 79* 71* 65* 54* 44*  --   --   --  33*  --  32*   CREATININE mg/dL 4 09* 3 97* 3 95* 3 38* 2 97*  --   --   --  2 10*  --  1 70*   GLUCOSE, ISTAT mg/dl  --   --   --   --   --   --   --  95  --    < >  --    CALCIUM mg/dL 7 7* 7 9* 7 6* 7 8* 8 3  --   --   --  8 3  --  8 4    < > = values in this interval not displayed            Lab Results   Component Value Date    [facility] 4,843 (H) 04/23/2019            Results from last 7 days   Lab Units 12/17/19  0433 12/16/19  0356 12/15/19  0354   MAGNESIUM mg/dL 2 8* 2 8* 1 7                     Results from last 7 days   Lab Units 12/17/19  0433 12/15/19  1607 12/15/19  0354   INR  1 48* 1 75* 1 27*     Lipid Profile:   No results found for: CHOL  Lab Results Component Value Date    HDL 43 2018    HDL 32 (L) 2017    HDL 38 (L) 2016     Lab Results   Component Value Date    LDLCALC 115 (H) 2018    LDLCALC 116 (H) 2017    LDLCALC 131 (H) 2016     Lab Results   Component Value Date    TRIG 82 2018    TRIG 142 2017    TRIG 124 2016         Cardiac testing:   Results for orders placed during the hospital encounter of 19   Echo complete with contrast if indicated    Narrative [address]  [facility], [address] [facility]  [phone]    Transthoracic Echocardiogram  2D, M-mode, Doppler, and Color Doppler    Study date:  2019    Patient: Guanako Lopez  MR number: [id number]  Account number: [de-identified]  : 1940  Age: 70 years  Gender: Male  Status: Outpatient  Location: Bedside  Height: 68 in  Weight: 191 6 lb  BP: 139/ 74 mmHg    Indications: Bradycardia, hypertension, heart failure  Diagnoses: R00 1 - Bradycardia, unspecified    Sonographer:  LAUREEN Allen  Interpreting Physician:  Praneeth Shaw MD  Primary Physician:  Malou Machado  Referring Physician:  Fabiola Horne:  [facility]  Cardiology Fellow: Chance Keys MD    SUMMARY    PROCEDURE INFORMATION:  This was a technically difficult study  LEFT VENTRICLE:  Systolic function was normal  Ejection fraction was estimated to be 60 %  There were no regional wall motion abnormalities  Wall thickness was mildly increased  The changes were consistent with concentric remodeling (increased wall thickness with normal wall mass)  Doppler parameters were consistent with abnormal left ventricular relaxation (grade 1 diastolic dysfunction)  MITRAL VALVE:  There was mild annular calcification  TRICUSPID VALVE:  Transtricuspid velocity was increased due to increased transvalvular flow  There was mild regurgitation  COMPARISONS:  There has been no significant interval change  Comparison was made with the previous study of 08-Mar-2017  HISTORY: PRIOR HISTORY: HTN, CKD, HLD  PROCEDURE: The procedure was performed at the bedside  This was a routine study  The transthoracic approach was used  The study included complete 2D imaging, M-mode, complete spectral Doppler, and color Doppler  The heart rate was 60 bpm,  at the start of the study  Images were obtained from the parasternal, apical, subcostal, and suprasternal notch acoustic windows  Echocardiographic views were limited due to chest wall deformity  This was a technically difficult study  LEFT VENTRICLE: Size was normal  Systolic function was normal  Ejection fraction was estimated to be 60 %  There were no regional wall motion abnormalities  Wall thickness was mildly increased  The changes were consistent with concentric  remodeling (increased wall thickness with normal wall mass)  DOPPLER: Doppler parameters were consistent with abnormal left ventricular relaxation (grade 1 diastolic dysfunction)  RIGHT VENTRICLE: The size was normal  Systolic function was normal     LEFT ATRIUM: Size was normal     RIGHT ATRIUM: Size was normal     MITRAL VALVE: There was mild annular calcification  There was normal leaflet separation  DOPPLER: The transmitral velocity was within the normal range  There was no evidence for stenosis  There was no regurgitation  AORTIC VALVE: The valve was trileaflet  Leaflets exhibited normal thickness, mild to moderate calcification, normal cuspal separation, and sclerosis  DOPPLER: Transaortic velocity was within the normal range  There was no evidence for  stenosis  There was no regurgitation  TRICUSPID VALVE: There was normal leaflet separation  DOPPLER: Transtricuspid velocity was increased due to increased transvalvular flow  There was no evidence for stenosis  There was mild regurgitation  Pulmonary artery systolic pressure  was within the normal range   Estimated peak PA pressure was 35 mmHg     PULMONIC VALVE: DOPPLER: The transpulmonic velocity was within the normal range  There was trace regurgitation  PERICARDIUM: There was no pericardial effusion  AORTA: The root exhibited normal size  SYSTEMIC VEINS: IVC: The inferior vena cava was not well visualized  HEPATIC VEINS: The hepatic veins were not well visualized  PULMONARY VEINS: Not well visualized  SYSTEM MEASUREMENT TABLES    2D  %FS: 35 38 %  Ao Diam: 3 24 cm  EDV(Teich): 83 73 ml  EF(Teich): 65 08 %  ESV(Teich): 29 24 ml  IVSd: 1 4 cm  LA Diam: 3 94 cm  LVEDV MOD A4C: 83 8 ml  LVEF MOD A4C: 57 %  LVESV MOD A4C: 36 04 ml  LVIDd: 4 31 cm  LVIDs: 2 79 cm  LVLd A4C: 8 31 cm  LVLs A4C: 6 37 cm  LVPWd: 1 17 cm  RVIDd: 3 96 cm  SV MOD A4C: 47 77 ml  SV(Teich): 54 49 ml    CW  AV Env  Ti: 327 57 ms  AV VTI: 43 76 cm  AV Vmax: 1 89 m/s  AV Vmean: 1 34 m/s  AV maxP 32 mmHg  AV meanP 89 mmHg  TR Vmax: 2 6 m/s  TR maxP mmHg    MM  TAPSE: 3 12 cm    PW  E': 0 06 m/s  E/E': 12 75  LVOT Env  Ti: 364 48 ms  LVOT VTI: 24 83 cm  LVOT Vmax: 1 m/s  LVOT Vmean: 0 68 m/s  LVOT maxPG: 3 96 mmHg  LVOT meanP 16 mmHg  MV A Chet: 0 74 m/s  MV Dec Jack: 2 56 m/s2  MV DecT: 297 05 ms  MV E Chet: 0 76 m/s  MV E/A Ratio: 1 03  MV PHT: 86 14 ms  MVA By PHT: 2 55 cm2    [employer] Accredited Echocardiography Laboratory    Prepared and electronically signed by    Yan Aden MD  Signed 2019 13:47:28       Imaging: I have personally reviewed pertinent reports  and I have personally reviewed pertinent films in PACS  Xr Chest Portable    Result Date: 2019  Narrative: CHEST INDICATION:   Post Open Heart surgery  Status post ascending aorta replacement with hemiarch reconstruction for type A dissection  EXAM PERFORMED/VIEWS:  XR CHEST PORTABLE  COMPARISON:  Chest radiograph from 12/15/2019 and chest CT from 2019  FINDINGS: ET tube and NG tube removed  Right jugular catheter at cavoatrial junction    Right jugular pulmonary artery catheter in right pulmonary artery  Persistence of 2 mediastinal drains  Normal heart size  Sternal wires well aligned  Mild bibasilar atelectasis  No effusion or pneumothorax  Clips in the right axillary region  Right shoulder arthroplasty  Impression: Extubation  Mild bibasilar atelectasis  Expected appearance after cardiac surgery  Workstation performed: [id number]     Xr Chest 2 Views    Result Date: 12/15/2019  Narrative: CHEST INDICATION:   Chest Pain  COMPARISON:  April 23, 2019 EXAM PERFORMED/VIEWS:  XR CHEST PA & LATERAL Images: 2 FINDINGS: Cardiomegaly seen No acute consolidation or congestion Reverse right shoulder arthroplasty Osseous structures appear within normal limits for patient age  Impression: Cardiomegaly No acute consolidation or congestion Workstation performed: [id number]     Cta Dissection Protocol Chest Abdomen Pelvis W Wo Contrast    Result Date: 12/14/2019  Narrative: CTA - CHEST, ABDOMEN AND PELVIS - WITHOUT AND WITH IV CONTRAST INDICATION:   Thoracic aortic aneurysm suspected  Chest pain COMPARISON:  CT of the chest on February 24, 2014  TECHNIQUE: CT examination of the chest, abdomen and pelvis was performed both prior to and after the administration of intravenous contrast   Thin section angiographic arterial phase post contrast technique was used in order to evaluate for aortic dissection  3D reformatted images and volume rendering were performed on an independent workstation  Additionally, axial, sagittal, and coronal 2D reformatted images were created from the source data and submitted for interpretation  Radiation dose length product (DLP) for this visit:  2561 29 mGy-cm   This examination, like all CT scans performed in the [facility], was performed utilizing techniques to minimize radiation dose exposure, including the use of iterative reconstruction and automated exposure control   IV Contrast:  100 mL of iodixanol (Walter Ortiz)  was administered intravenously without immediate adverse reaction  Enteric Contrast:  Enteric contrast was not administered  FINDINGS: AORTA:  There is interval increase in size of ascending thoracic aneurysm measuring 4 6 x 4 4 cm, previously 4 0 x 4 0 cm  There is crescentic intermediate density along the aorta (series 2, image 23) extending from the aortic root through the aortic arch (series 2, image 15) and extending along the descending aorta just past the level of the main pulmonary artery compatible with intramural hematoma  The aortic arch measures 4 1 cm in diameter  Ulcerative plaque is seen within the superior aspect of the aortic arch (series 601, image 83)  The remainder of the distal descending thoracic aorta and abdominal aorta is normal in size  Mild narrowing at the origin of the celiac artery  Superior mesenteric arteries patent  Bilateral renal arteries are patent  The inferior mesenteric arteries patent  Minimal atherosclerotic narrowing of the proximal left internal iliac artery  CHEST LUNGS:  Emphysematous changes of lungs  Minimal atelectatic changes in the dependent lungs  Central airways are patent  PLEURA:  Unremarkable  HEART/GREAT VESSELS:  Mild mitral annular and aortic calcifications  The heart is normal in size  No pericardial effusion  MEDIASTINUM AND CHON:  Mediastinal lymph nodes measure up to 7 mm in short axis  CHEST WALL AND LOWER NECK:   Unremarkable  ABDOMEN LIVER/BILIARY TREE:  Nodular appearance of the liver, correlate for cirrhosis  GALLBLADDER:  No calcified gallstones  No pericholecystic inflammatory change  SPLEEN:  Unremarkable  PANCREAS:  Unremarkable  ADRENAL GLANDS:  Thickening of bilateral adrenal glands  KIDNEYS/URETERS:  Atrophic right kidney  Scattered areas of cortical thinning/scarring in the left kidney  Simple cyst in the left kidney  STOMACH AND BOWEL:  No bowel obstruction  Diverticulosis without evidence of diverticulitis  APPENDIX:  No findings to suggest appendicitis  ABDOMINOPELVIC CAVITY:  Portacaval lymph nodes measure up to 1 cm in short axis, likely reactive  No significant ascites  No free air  VESSELS:  Mild atherosclerotic calcifications  PELVIS REPRODUCTIVE ORGANS:  Enlarged prostate  Urolift devices are seen  URINARY BLADDER:  Unremarkable  ABDOMINAL WALL/INGUINAL REGIONS:  Fat-containing left inguinal hernia  OSSEOUS STRUCTURES:  No acute fracture or destructive osseous lesion  Healed fracture of the right inferior pubic ramus  Chronic compression fracture of L3 with moderate vertebral body height loss  Grade 1 retrolisthesis of L3 on L4  Impression: 1  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 tapering to 4 1 cm at the aortic arch  2   There is crescentic intermediate density along the aortic wall extending from the aortic root through the aortic arch and along the proximal descending thoracic aorta compatible with acute type A intramural hematoma  Emergent vascular consultation is recommended  I personally discussed this study with Boni Cockayne on 12/14/2019 at 10:57 PM  Workstation performed: [id number]     Xr Chest Portable Icu    Result Date: 12/16/2019  Narrative: CHEST INDICATION:   S/P open heart  COMPARISON:  Chest x-ray 12/14/2019 EXAM PERFORMED/VIEWS:  XR CHEST PORTABLE ICU FINDINGS:  There has been prior sternotomy  ET tube tip is approximately 2 to 3 cm from the jen  Enteric tube seen passing to the stomach  Right IJ [place] line tip overlies the right pulmonary artery  Right IJ central line catheter tip overlies  the cavoatrial junction  Mediastinal drains identified  Scattered subcutaneous emphysema in the left neck  Multiple surgical clips in the right subclavicular region  Cardiac silhouette is enlarged  Central fullness of the pulmonary vasculature  The lungs are otherwise clear  No pneumothorax or pleural effusion   Osseous structures appear within normal limits for patient age  Impression: 1  Status post sternotomy  Lines and support devices appear in position as noted above  Central fullness of the pulmonary vasculature may represent mild pulmonary vascular congestion  Workstation performed: [id number]     Thank you for allowing us to participate in this patient's care  Counseling / Coordination of Care  Total floor / unit time spent today 45 minutes  Greater than 50% of total time was spent with the patient and / or family counseling and / or coordination of care  A description of the counseling / coordination of care: Review of history, current assessment, development of a plan  Code Status: Level 1 - Full Code    ** Please Note: Dragon 360 Dictation voice to text software may have been used in the creation of this document   **

## 2019-12-17 NOTE — PROGRESS NOTES
Progress Note - Cardiothoracic Surgery   [facility] A Core 70 y o  male MRN: [id number]  Unit/Bed#: [facility] 413-01 Encounter: [id number]      POD # 2 s/p repair of type A dissection/intramural hematoma    Pt seen/examined  Interval history and data reviewed with critical care team   Pt doing well  No specific complaints  OOBTC  Off drips  Cr   Has not yet leveled off, but UOP greatly improved on lasix gtt        Medications:   Scheduled Meds:    Current Facility-Administered Medications:  acetaminophen 650 mg Oral Q4H PRN Meribeth TRINY Joseph    amiodarone 200 mg Oral [facility] Meribeth TRINY Joseph    aspirin 325 mg Oral Daily Meribeth TRINY Joseph    atorvastatin 80 mg Oral Daily [employer] RosaliaTRINY    bisacodyl 10 mg Rectal Daily PRN Meribeth TRINY Joseph    chlorhexidine 15 mL Swish & Spit BID Mergeena Joseph PA-C    furosemide 30 mg/hr Intravenous Continuous Jeraline TRINY Almeida Last Rate: 30 mg/hr (12/17/19 0108)   heparin (porcine) 5,000 Units Subcutaneous [facility] Irl DacTRINY marte    insulin lispro 1-5 Units Subcutaneous TID AC Alex Linder PA-C    insulin lispro 1-5 Units Subcutaneous HS Alex Linder PA-C    metoprolol tartrate 12 5 mg Oral Q12H [address] Ray TRINY Morrow    mupirocin 1 application Nasal D66Q [address] Mergeena Joseph PA-C    ondansetron 4 mg Intravenous Q6H PRN Meribeth TRINY Joseph    oxyCODONE-acetaminophen 1 tablet Oral Q4H PRN Meribeth TRINY Joseph    oxyCODONE-acetaminophen 2 tablet Oral Q6H PRN Meribeth SUE Joseph-LYNNE    pantoprazole 40 mg Oral Daily Meribeth Opal, PA-LYNNE    polyethylene glycol 17 g Oral Daily Meribeth Opal, PA-LYNNE    potassium chloride 20 mEq Oral BID Alex Linder PA-C    potassium chloride 20 mEq Intravenous Once PRN Meribeth SUE Joseph-LYNNE    potassium chloride 20 mEq Intravenous Q1H PRN Meribeth TRINY Joseph    potassium chloride 20 mEq Intravenous Q30 Min PRN Purnima Joseph PA-C      Continuous Infusions:    furosemide 30 mg/hr Last Rate: 30 mg/hr (12/17/19 0108)     PRN Meds:   acetaminophen    bisacodyl    ondansetron    oxyCODONE-acetaminophen    oxyCODONE-acetaminophen    potassium chloride    potassium chloride    potassium chloride    Vitals: Blood pressure 123/79, pulse 74, temperature 98 1 °F (36 7 °C), temperature source Oral, resp  rate 13, height 5' 9" (1 753 m), weight 86 8 kg (191 lb 5 8 oz), SpO2 96 %  ,Body mass index is 28 26 kg/m²  I/O last 24 hours: In: 876 3 [P O :690; I V :186 3]  Out: 2195 [Urine:1885; Chest Tube:310]  Invasive Devices     Central Venous Catheter Line            CVC Central Lines 12/15/19 Triple 1 day          Peripheral Intravenous Line            Peripheral IV 12/14/19 Right Antecubital 2 days    Peripheral IV 12/15/19 Right Wrist 2 days    Peripheral IV 12/15/19 Right Antecubital 1 day          Line            Pacer Wires 1 day    Pacer Wires 1 day          Drain            Chest Tube 1 Left Mediastinal 24 Fr  1 day    Chest Tube 2 Anterior Mediastinal 32 Fr  1 day    Chest Tube 3 Posterior Mediastinal 32 Fr  1 day    Urethral Catheter Non-latex; Temperature probe 1 day                  Lab, Imaging and other studies:   Results from last 7 days   Lab Units 12/16/19  0356 12/16/19  0001 12/15/19  1619 12/15/19  1607  12/15/19  1354  12/15/19  0354 12/14/19  2005   WBC Thousand/uL 21 18*  --   --   --   --   --   --  11 24* 9 07   HEMOGLOBIN g/dL 11 3* 12 9  --  12 6  --   --   --  12 7 14 8   I STAT HEMOGLOBIN g/dl  --   --  11 9*  --    < >  --    < >  --   --    HEMATOCRIT % 34 4* 39 6  --  38 5  --   --   --  38 0 44 3   HEMATOCRIT, ISTAT %  --   --  35*  --    < >  --    < >  --   --    PLATELETS Thousands/uL 147*  --   --  169  --  77*  --  157 169    < > = values in this interval not displayed       Results from last 7 days   Lab Units 12/17/19  0433 12/17/19  0020 12/16/19  1557  12/15/19  1619   POTASSIUM mmol/L 4 1 4 3 3 9   < >  --    CHLORIDE mmol/L 110* 112* 114*   < >  --    CO2 mmol/L 24 23 20*   < >  --    CO2, I-STAT mmol/L  --   --   --   --  24   BUN mg/dL 71* 65* 54*   < >  --    CREATININE mg/dL 3 97* 3 95* 3 38*   < >  --    GLUCOSE, ISTAT mg/dl  --   --   --   --  95   CALCIUM mg/dL 7 9* 7 6* 7 8*   < >  --     < > = values in this interval not displayed  Results from last 7 days   Lab Units 12/17/19  0433 12/15/19  1607 12/15/19  0354   INR  1 48* 1 75* 1 27*   PTT seconds  --  41*  --      Recent Labs     12/15/19  2040   PHART 7 320*   [id number] 16 7*   PO2ART 114 5   BMQ5BRC 33 1*   BEART -8 3           Plan:    Remove wires and drains  Ambulate  Incentive spirometry  Diuresis -continue lasix gtt for 1-2L negative, trend BUN/Cr    PO ASA/Statin/Beta blocker  Transfer to tele        SIGNATURE: Frederick Bell DO  DATE: December 17, 2019  TIME: 7:43 AM

## 2019-12-17 NOTE — PLAN OF CARE
Problem: OCCUPATIONAL THERAPY ADULT  Goal: Performs self-care activities at highest level of function for planned discharge setting  See evaluation for individualized goals  Description  Treatment Interventions: ADL retraining, Functional transfer training, Endurance training, Patient/family training, Equipment evaluation/education, Compensatory technique education, Continued evaluation, Cardiac education, Energy conservation, Activityengagement          See flowsheet documentation for full assessment, interventions and recommendations  Outcome: Completed  Note:   Limitation: Decreased ADL status, Decreased Safe judgement during ADL, Decreased endurance, Decreased self-care trans, Decreased high-level ADLs  Prognosis: Good  Assessment: Patient participated in Skilled OT session this date with interventions consisting of ADL re training with the use of correct body mechnaics, safety awareness and fall prevention techniques, increase cardiovascular endurance , increase postural control, increase trunk control and increase OOB/ sitting tolerance  Patient agreeable to OT treatment session, upon arrival patient was found seated OOB to Chair  In comparison to previous session, patient with improvements in transfers and activity tolerance*   Patient requiring ocassional safety reminders  Patient has attained all treatment goals and is now demonstrating ability to complete UB/LB ADLs at supervision, with the use of Rolling Walker  Patient was educated on sternal/cardiac precautions , patient verbalized understanding and demonstrated recommended plan correctly  Patient appears to be functioning at baseline  No further acute OT needs identified at this time to warrant continuation of services  D/C OT services  From OT standpoint, recommendation at time of d/c would be Home with family support  OT Discharge Recommendation: Home with family support  OT - OK to Discharge:  Yes

## 2019-12-17 NOTE — OCCUPATIONAL THERAPY NOTE
OccupationalTherapy Progress Note     Patient Name: Bill Alexandre Date: 12/17/2019  Problem List  Principal Problem:    Dissection of thoracic aorta (HCC)  Active Problems:    Benign essential hypertension    Abnormal TSH    Benign hypertension with CKD (chronic kidney disease) stage III (HCC)    Benign prostatic hyperplasia without lower urinary tract symptoms    S/P aorta repair          12/17/19 1434   Restrictions/Precautions   Weight Bearing Precautions Per Order No   Other Precautions Cardiac/sternal;Multiple lines;Telemetry   General   Response to Previous Treatment Patient with no complaints from previous session   Lifestyle   Autonomy pta pt reports I in ADLs, shares IADLs, and I for functional mobility   Reciprocal Relationships supportive family; someone will be able to assist PRN upon d/c   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   Intrinsic Gratification likes doing  work   Pain Assessment   Pain Assessment No/denies pain   Pain Score No Pain   ADL   Where Assessed Chair   UB Dressing Assistance 5  [address]; Thread LUE   Transfers   Sit to Stand 5  Supervision   Additional items Assist x 1   Stand to Sit 5  Supervision   Additional items Assist x 1   Functional Mobility   Functional Mobility 4  Minimal assistance   Additional Comments CGA- occasional VC for safety   Additional items Rolling walker   Cognition   Overall Cognitive Status WFL   Arousal/Participation Cooperative   Attention Attends with cues to redirect   Orientation Level Oriented X4   Memory Decreased recall of precautions   Following Commands Follows one step commands without difficulty   Comments pt pleasant and cooeprative   Activity Tolerance   Activity Tolerance Patient tolerated treatment well   Medical Staff Made Aware Restorative godwin, okay to see per RN   Assessment   Assessment Patient participated in Skilled OT session this date with interventions consisting of ADL re training with the use of correct body mechnaics, safety awareness and fall prevention techniques, increase cardiovascular endurance , increase postural control, increase trunk control and increase OOB/ sitting tolerance  Patient agreeable to OT treatment session, upon arrival patient was found seated OOB to Chair  In comparison to previous session, patient with improvements in transfers and activity tolerance*   Patient requiring ocassional safety reminders  Patient has attained all treatment goals and is now demonstrating ability to complete UB/LB ADLs at supervision, with the use of Rolling Walker  Patient was educated on sternal/cardiac precautions , patient verbalized understanding and demonstrated recommended plan correctly  Patient appears to be functioning at baseline  No further acute OT needs identified at this time to warrant continuation of services  D/C OT services  From OT standpoint, recommendation at time of d/c would be Home with family support  Plan   Treatment Interventions ADL retraining;Functional transfer training; Endurance training;Patient/family training   Goal Expiration Date 12/26/19   OT Treatment Day 2   OT Frequency 3-5x/wk   Recommendation   OT Discharge Recommendation Home with family support   OT - OK to Discharge Yes   Barthel Index   Feeding 10   Bathing 0   Grooming Score 5   Dressing Score 5   Bladder Score 10   Bowels Score 10   Toilet Use Score 5   Transfers (Bed/Chair) Score 10   Mobility (Level Surface) Score 10   Stairs Score 0   Barthel Index Score 65   Modified [place] Scale   Modified [place] Scale 3     Celestina Reich MS, OTR/L

## 2019-12-17 NOTE — PROGRESS NOTES
Progress Note - Critical Care   Oliver Sharpe 70 y o  male MRN: [id number]  Unit/Bed#: [facility] 413-01 Encounter: [id number]      Attending Physician: Cornel Tobias,     24 Hour Events/HPI: POD # 2 s/p replacement of ascending aorta and hemiarch reconstruction and aortic valve resuspension  Pressors and primacor weaned off  Delined  Minimal urine output following initial lasix dosing- given bolus and gtt initiated at 20mg/hr  No initial response so gtt increased to 40mg/hr and dose of zaroxalyn given with appropriate response in urine output  Only complaint this morning is a cough  ROS: Review of Systems  Review of systems was reviewed and negative unless stated above in HPI/24-hour events   ---------------------------------------------------------------------------------------------------------------------------------------------------------------------  Impressions:  1  S/p Replacement of ascending aorta with aggressive hemiarch reconstruction and aortic valve resuspension  2  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  3  Hypertension  4  CKD stage 3  5  BPH  6  GERD  7  Hyperlipidemia    Plan:    Neuro:   · Pain controlled with: prn tylenol oxy  · Regulate sleep/wake cycle  · Delirium precautions  · CAM-ICU daily  · Trend neuro exam    CV:   · Cardiac infusions: None  · MAP goal > 65 and CI >2 2  · De-lined  · Rhythm: NSR  · Follow rhythm on telemetry  · Lopressor 12 5 mg PO BID  · Epicardial pacing wires no longer required    Remove today    Lung:   · Good Room air oxygen saturation; Continue incentive spirometry/Coughing/Deep breathing exercises  · Chest tube drainage diminished; D/C today   · 50 in 8 and 250 in 24    GI:   · Continue PPI for stress ulcer prophylaxis  · Continue bowel regimen  · Trend abdominal exam and bowel function    FEN:   · Diuretic plan: Lasix gtt at 30mg/hr  · Q8 BMP  · Nutrition/diet plan: Cardiac diet  · Replete electrolytes with goals: K >4 0, Mag >2 0, and Phos >3 0    :   · Indwelling Hills present: yes   · Maintain Hills  · Trend UOP and BUN/creat  · Strict I and O    ID:   · Trend temps and WBC count  · Maintain normothermia    Heme:   · Trend hgb and plts    Endo:   · Glycemic control plan: Continue SSI    MSK/Skin:  · Mobility goal: OOB with meals, ambulate as able  · PT consult: yes  · OT consult: yes  · Frequent turning and pressure off-loading  · Local wound care as needed    Disposition:  Transfer to telemetry  ---------------------------------------------------------------------------------------------------------------------------------------------------------------------  Allergies: No Known Allergies  Medications:   Scheduled Meds:    Current Facility-Administered Medications:  acetaminophen 650 mg Oral Q4H PRN Gabo Olivas PA-C    amiodarone 200 mg Oral Q8H [facility] Gbao Olivas PA-C    aspirin 325 mg Oral Daily Gabo Olivas PA-C    atorvastatin 80 mg Oral Daily With [employer] RosaliaTRINY rodriguez    bisacodyl 10 mg Rectal Daily PRN Gabo Olivas PA-C    chlorhexidine 15 mL Swish & Spit BID Gabo Olivas PA-C    furosemide 30 mg/hr Intravenous Continuous Shazia Gaytan PA-C Last Rate: 30 mg/hr (12/17/19 0108)   heparin (porcine) 5,000 Units Subcutaneous Q8H [facility] Danyelle Jacobson PA-C    insulin lispro 1-5 Units Subcutaneous TID AC Alex Linder PA-C    insulin lispro 1-5 Units Subcutaneous HS Alex Linder PA-C    metoprolol tartrate 12 5 mg Oral Q12H [facility] Alex Linder PA-C    mupirocin 1 application Nasal N07P [facility] aGbo Olivas PA-C    ondansetron 4 mg Intravenous Q6H PRN Gabo Olivas PA-C    oxyCODONE-acetaminophen 1 tablet Oral Q4H PRN Gabo Olivas PA-C    oxyCODONE-acetaminophen 2 tablet Oral Q6H PRN Gabo Olivas PA-C    pantoprazole 40 mg Oral Daily Gabo Olivas PA-C    polyethylene glycol 17 g Oral Daily Gabo Olivas PA-C    potassium chloride 20 mEq Oral BID Daralene Katelyn Linder PA-C    potassium chloride 20 mEq Intravenous Once PRN Diana Vernalis, PA-C    potassium chloride 20 mEq Intravenous Q1H PRN Diana Vernalis, PA-C    potassium chloride 20 mEq Intravenous Q30 Min PRN Diana Vernalis, PA-C      VTE Pharmacologic Prophylaxis: Heparin  VTE Mechanical Prophylaxis: sequential compression device    Continuous Infusions:    furosemide 30 mg/hr Last Rate: 30 mg/hr (12/17/19 0108)     PRN Meds:    acetaminophen 650 mg Q4H PRN   bisacodyl 10 mg Daily PRN   ondansetron 4 mg Q6H PRN   oxyCODONE-acetaminophen 1 tablet Q4H PRN   oxyCODONE-acetaminophen 2 tablet Q6H PRN   potassium chloride 20 mEq Once PRN   potassium chloride 20 mEq Q1H PRN   potassium chloride 20 mEq Q30 Min PRN     Home Medications:   Prior to Admission medications    Medication Sig Start Date End Date Taking?  Authorizing Provider   amLODIPine (NORVASC) 10 mg tablet Reported on 11/30/2016 11/26/16  Yes Historical Provider, MD   FLUAD 0 5 ML GEORGIE inject 0 5 milliliter intramuscularly 9/15/18   Historical Provider, MD   hydrALAZINE (APRESOLINE) 25 mg tablet  4/30/19   Historical Provider, MD   hydrALAZINE (APRESOLINE) 50 mg tablet Take 1 tablet (50 mg total) by mouth every 8 (eight) hours for 30 days 4/25/19 5/25/19  Reva Bauer MD   hydrochlorothiazide (HYDRODIURIL) 25 mg tablet TAKE 1 TABLET (25 MG TOTAL) BY MOUTH DAILY IN THE EARLY MORNING 2/23/18   Historical Provider, MD   losartan (COZAAR) 100 MG tablet take 1 tablet by mouth once daily (STOP LOSARTAN 50MG UNLESS YOUR DOCTOR WANTS OTHERWISE) 1/8/18   Historical Provider, MD   losartan (COZAAR) 50 mg tablet take 1 tablet by mouth once daily 11/26/16   Historical Provider, MD   losartan-hydrochlorothiazide (HYZAAR) 100-25 MG per tablet Take 1 tablet by mouth daily for 90 days 4/5/19 7/4/19  Eladio Spence MD   omeprazole (PriLOSEC) 20 mg delayed release capsule take 1 capsule by mouth daily at bedtime  Patient not taking: Reported on 4/5/2019 4/13/18   Patricio Perez MD   [facility] 50 MCG SUSR inject 0 5 milliliter intramuscularly 9/15/18   Historical Provider, MD   tamsulosin (FLOMAX) 0 4 mg  3/2/17   Historical Provider, MD     ---------------------------------------------------------------------------------------------------------------------------------------------------------------------  Vitals:   Vitals:    19 2300 19 0000 19 0200 19 0544   BP: 91/57 105/55 101/58    BP Location:  Right arm     Pulse: 80 80 78    Resp: 15 (!) 23 17    Temp:  97 8 °F (36 6 °C)     TempSrc:  Oral     SpO2: 96% 94% 94%    Weight:    86 8 kg (191 lb 5 8 oz)   Height:         Arterial Line:  Arterial Line BP: 116/58  Arterial Line MAP (mmHg): 76 mmHg    Tele Rhythm: NSR This was personally reviewed by myself  Respiratory:  SpO2: SpO2: 94 %, SpO2 Activity: SpO2 Activity: At Rest, SpO2 Device: O2 Device: Nasal cannula  O2 Flow Rate (L/min): 3 L/min    Ventilator:  Respiratory    Lab Data (Last 4 hours)    None         O2/Vent Data (Last 4 hours)    None              Temperature: Temp (24hrs), Av 2 °F (36 2 °C), Min:96 8 °F (36 °C), Max:97 8 °F (36 6 °C)  Current: Temperature: 97 8 °F (36 6 °C)    Weights:   Weight (last 2 days)     Date/Time   Weight    19 0544   86 8 (191 36)    19 0600   83 5 (184 08)    12/15/19 2125       Comment rows:    OBSERV: pt extubated per Koffi Puga by Shellie LUTZ at 12/15/19 2125    12/15/19 1941       Comment rows:    OBSERV: pt weaning on vent at 12/15/19 1941    12/15/19 010   81 6 (179 9)            Body mass index is 28 26 kg/m²      Hemodynamic Monitoring:  PAP: PAP: , CVP: CVP (mean): 8 mmHg, CO: CO (L/min): 4 9 L/min, CI: CI (L/min/m2): 2 5 L/min/m2, SVR: SVR (dyne*sec)/cm5: 1256 (dyne*sec)/cm5  PAP: (20-35)/(7-20) 22/12  CO:  [4 2 L/min-5 5 L/min] 4 9 L/min  CI:  [2 1 L/min/m2-2 8 L/min/m2] 2 5 L/min/m2    Intake and Outputs:    Intake/Output Summary (Last 24 hours) at 2019 0837  Last data filed at 2019 0200  Gross per 24 hour   Intake 864 32 ml   Output 1660 ml   Net -795 68 ml     I/O last 24 hours: In: 3114 7 [P O :1370; I V :1214 7; NG/GT:80; IV Piggyback:450]  Out: 2325 [Urine:1915; Chest Tube:410]    UOP: around 100cc/hour   BM: No in the last 24 hours    Chest tube Output:  Mediastinal tubes: 50 mL/8 hours  250 mL/24 hours          Labs:  Results from last 7 days   Lab Units 12/16/19  0356 12/16/19  0001 12/15/19  1619 12/15/19  1607  12/15/19  1354  12/15/19  0354 12/14/19 2005   WBC Thousand/uL 21 18*  --   --   --   --   --   --  11 24* 9 07   HEMOGLOBIN g/dL 11 3* 12 9  --  12 6  --   --   --  12 7 14 8   I STAT HEMOGLOBIN g/dl  --   --  11 9*  --    < >  --    < >  --   --    HEMATOCRIT % 34 4* 39 6  --  38 5  --   --   --  38 0 44 3   HEMATOCRIT, ISTAT %  --   --  35*  --    < >  --    < >  --   --    PLATELETS Thousands/uL 147*  --   --  169  --  77*  --  157 169   NEUTROS PCT %  --   --   --   --   --   --   --   --  70   MONOS PCT %  --   --   --   --   --   --   --   --  10    < > = values in this interval not displayed       Results from last 7 days   Lab Units 12/17/19  0433 12/17/19  0020 12/16/19  1557  12/15/19  1619  12/15/19  1458 12/15/19  1412  12/14/19 2005   SODIUM mmol/L 144 145 146*   < >  --    < >  --   --    < > 141   POTASSIUM mmol/L 4 1 4 3 3 9   < >  --    < >  --   --    < > 4 5   CHLORIDE mmol/L 110* 112* 114*   < >  --    < >  --   --    < > 110*   CO2 mmol/L 24 23 20*   < >  --    < >  --   --    < > 26   CO2, I-STAT mmol/L  --   --   --   --  24  --  24 26   < >  --    BUN mg/dL 71* 65* 54*   < >  --    < >  --   --    < > 31*   CREATININE mg/dL 3 97* 3 95* 3 38*   < >  --    < >  --   --    < > 1 86*   CALCIUM mg/dL 7 9* 7 6* 7 8*   < >  --    < >  --   --    < > 9 3   ALK PHOS U/L  --   --   --   --   --   --   --   --   --  54   ALT U/L  --   --   --   --   --   --   --   --   --  29   AST U/L  --   --   --   --   --   --   --   --   --  39   GLUCOSE, ISTAT mg/dl  --   --   --   --  95  --  104 131   < >  --     < > = values in this interval not displayed  Baseline Creat: 1 8    Results from last 7 days   Lab Units 12/17/19  0433 12/16/19  0356 12/15/19  0354   MAGNESIUM mg/dL 2 8* 2 8* 1 7          Results from last 7 days   Lab Units 12/17/19  0433 12/15/19  1607 12/15/19  0354   INR  1 48* 1 75* 1 27*   PTT seconds  --  41*  --                   Results from last 7 days   Lab Units 12/15/19  2040   PH ART  7 320*   PCO2 ART mm Hg 33 1*   PO2 ART mm Hg 114 5   HCO3 ART mmol/L 16 7*   BASE EXC ART mmol/L -8 3   ABG SOURCE  Line, Arterial             Micro:   Blood Culture: No results found for: BLOODCX  Urine Culture:   Lab Results   Component Value Date    URINECX 10,000-19,000 cfu/ml  03/08/2018     Sputum Culture: No components found for: SPUTUMCX  Wound Culure: No results found for: WOUNDCULT    Diagnositic Studies:  No new imaging this AM    Nutrition:        Diet Orders   (From admission, onward)             Start     Ordered    12/16/19 0000  Diet Cardiovascular; Cardiac; Fluid Restriction 1800 ML  Diet effective 0500     Question Answer Comment   Diet Type Cardiovascular    Cardiac Cardiac    Other Restriction(s): Fluid Restriction 1800 ML    RD to adjust diet per protocol? Yes        12/15/19 1552                Physical Exam   Constitutional: He is oriented to person, place, and time  He appears well-developed and well-nourished  HENT:   Head: Normocephalic and atraumatic  Eyes: Pupils are equal, round, and reactive to light  Conjunctivae and EOM are normal    Neck: Normal range of motion  Neck supple  Cardiovascular: Normal rate, regular rhythm, normal heart sounds and intact distal pulses  Exam reveals no friction rub  No murmur heard  Pulmonary/Chest: Effort normal and breath sounds normal  He has no wheezes  He has no rales  Abdominal: Soft  Bowel sounds are normal  He exhibits no distension  There is no tenderness  There is no guarding  Musculoskeletal: He exhibits no edema  Neurological: He is alert and oriented to person, place, and time  Skin: Skin is warm and dry  Capillary refill takes less than 2 seconds  Invasive lines and devices: Invasive Devices     Central Venous Catheter Line            CVC Central Lines 12/15/19 Triple 1 day          Peripheral Intravenous Line            Peripheral IV 12/14/19 Right Antecubital 2 days    Peripheral IV 12/15/19 Right Wrist 2 days    Peripheral IV 12/15/19 Right Antecubital 1 day          Line            Pacer Wires 1 day    Pacer Wires 1 day          Drain            Chest Tube 1 Left Mediastinal 24 Fr  1 day    Chest Tube 2 Anterior Mediastinal 32 Fr  1 day    Chest Tube 3 Posterior Mediastinal 32 Fr  1 day    Urethral Catheter Non-latex; Temperature probe 1 day              ---------------------------------------------------------------------------------------------------------------------------------------------------------------------  Code Status: Level 1 - Full Code    Counseling / Coordination of Care  Total time spent today 31 minutes  Greater than 50% of total time was spent with the patient and / or family counseling and / or coordination of care  A description of the counseling / coordination of care: RAQUEL COOK, charting      Collaborative bedside rounds performed with cardiac surgery attending and bedside RN      SIGNATURE: Yolette Chacon PA-C  DATE: December 17, 2019  TIME: 6:28 AM

## 2019-12-17 NOTE — RESTORATIVE TECHNICIAN NOTE
Restorative Specialist Mobility Note       Activity: Ambulate in [place](back to bed for tubes/wires)     Assistive Device: Front wheel walker

## 2019-12-17 NOTE — RESTORATIVE TECHNICIAN NOTE
Restorative Specialist Mobility Note       Activity: Ambulate in [place], Chair     Assistive Device: Front wheel walker

## 2019-12-17 NOTE — PHYSICAL THERAPY NOTE
PHYSICAL THERAPY NOTE          Patient Name: Pearl Harada  Today's Date: 12/17/2019 12/17/19 0900   Pain Assessment   Pain Assessment No/denies pain   Pain Score No Pain   Restrictions/Precautions   Weight Bearing Precautions Per Order No   Braces or Orthoses Other (Comment)  (denies)   Other Precautions Cardiac/sternal;Impulsive;Multiple lines;Telemetry;O2  ([place], [place], 1L NC - removed after session per nursing)   General   Chart Reviewed Yes   Additional Pertinent History cleared for tx (spoke to nsg)    Response to Previous Treatment Patient with no complaints from previous session  Family/Caregiver Present No   Cognition   Overall Cognitive Status WFL   Arousal/Participation Cooperative; Alert   Attention Attends with cues to redirect   Orientation Level Oriented to person;Oriented to place;Oriented to situation   Memory Decreased recall of precautions   Following Commands Follows one step commands without difficulty  (somewhat impulsive)   Comments Pt seen reclined in chair; agreeable to mobilize; states the he feels good    Subjective   Subjective pt reports no pain at the moment and is ready to walk   Transfers   Sit to Stand 4  Minimal assistance  (3 trials)   Additional items Assist x 1; Impulsive;Verbal cues  (min impulsive - requires VCs for pacing and safety)   Stand to Sit 4  Minimal assistance  (3 trials)   Additional items Assist x 1; Impulsive;Verbal cues  (min impulsive - requires VCs for pacing and safety)   Stand pivot 4  Minimal assistance   Additional items Assist x 1;Verbal cues  (chair to [facility] to right side )   Additional Comments pt left on BSC at end of session; call bell w/in reach - RN aware and present    Ambulation/Elevation   Gait pattern Short stride; Inconsistent shandra;Narrow VALERIE  (R foot supination seen by PT - pt reports it is his baseline)   Gait Assistance 4  Minimal assist  (SBAx2 for line management and chair follow)   Additional items Assist x 1;Verbal cues  (O2 sat in 90s% throughout - DR=796/80 post amb)   Assistive Device Rolling walker   Distance 2x85 ft  (seated rest period provided [facility] before return to room)   Balance   Static Sitting Fair   Static Standing Fair -   Ambulatory Poor +   Activity Tolerance   Activity Tolerance Patient tolerated treatment well   Nurse Made Aware Spoke to nsg    Assessment   Prognosis Good   Problem List Decreased strength;Decreased endurance; Impaired balance;Decreased mobility; Decreased safety awareness   Assessment Pt greeted reclined in chair; pleasant and agreeable to PT tx session and amb  Pt demonstrates improvement in progression towards previously set goals compared to last session  Pt displayed ability to preform transfers w/ Min (A)x1; amb 85 ft w/ RW Min (A)x1 w/ SBAx 2 for line management and chair follow  Seated rest period provided  Pt amb 85 ft back to room in same manner as first trial w/ O2 sat in 90s% throughout and transferred back to chair w/ Min (A)x1  Pt BP was 129/80 post-ambulation  Supplemental O2 removed after this transfer per nursing  Patient asked to use restroom at end of session and was transferred to [facility] w/ min (A)x1; left on [facility] w/ call bell w/in reach - RN aware  PT will continue to follow pt for progression of POC  Home PT still recommended upon d/c and when medically cleared; will follow  Goals   Patient Goals to walk more   STG Expiration Date 12/26/19   PT Treatment Day 1   Plan   Treatment/Interventions Functional transfer training;LE strengthening/ROM; Therapeutic exercise; Endurance training;Bed mobility;Gait training;Spoke to nursing   Progress Progressing toward goals   PT Frequency Other (Comment)  (4-6x/week)   Recommendation   Recommendation Home PT; Home with family support   Equipment Recommended Walker  (at this time)       Wilber Bañuelos

## 2019-12-17 NOTE — PLAN OF CARE
Problem: PHYSICAL THERAPY ADULT  Goal: Performs mobility at highest level of function for planned discharge setting  See evaluation for individualized goals  Description  Treatment/Interventions: Functional transfer training, LE strengthening/ROM, Therapeutic exercise, Endurance training, Bed mobility, Gait training, Spoke to nursing, OT  Equipment Recommended: Walker(at this time)       See flowsheet documentation for full assessment, interventions and recommendations  Outcome: Progressing  Note:   Prognosis: Good  Problem List: Decreased strength, Decreased endurance, Impaired balance, Decreased mobility, Decreased safety awareness  Assessment: Pt greeted reclined in chair; pleasant and agreeable to PT tx session and amb  Pt demonstrates improvement in progression towards previously set goals compared to last session  Pt displayed ability to preform transfers w/ Min (A)x1; amb 85 ft w/ RW Min (A)x1 w/ SBAx 2 for line management and chair follow  Seated rest period provided  Pt amb 85 ft back to room in same manner as first trial w/ O2 sat in 90s% throughout and transferred back to chair w/ Min (A)x1  Pt BP was 129/80 post-ambulation  Supplemental O2 removed after this transfer per nursing  Patient asked to use restroom at end of session and was transferred to [facility] w/ min (A)x1; left on [facility] w/ call bell w/in reach - RN aware  PT will continue to follow pt for progression of POC  Home PT still recommended upon d/c and when medically cleared; will follow  Recommendation: Home PT, Home with family support          See flowsheet documentation for full assessment

## 2019-12-18 LAB
ABO GROUP BLD BPU: NORMAL
ANION GAP SERPL CALCULATED.3IONS-SCNC: 11 MMOL/L (ref 4–13)
ANION GAP SERPL CALCULATED.3IONS-SCNC: 12 MMOL/L (ref 4–13)
ATRIAL RATE: 86 BPM
BPU ID: NORMAL
BUN SERPL-MCNC: 103 MG/DL (ref 5–25)
BUN SERPL-MCNC: 126 MG/DL (ref 5–25)
CALCIUM SERPL-MCNC: 8.1 MG/DL (ref 8.3–10.1)
CALCIUM SERPL-MCNC: 8.1 MG/DL (ref 8.3–10.1)
CHLORIDE SERPL-SCNC: 104 MMOL/L (ref 100–108)
CHLORIDE SERPL-SCNC: 106 MMOL/L (ref 100–108)
CO2 SERPL-SCNC: 24 MMOL/L (ref 21–32)
CO2 SERPL-SCNC: 27 MMOL/L (ref 21–32)
CREAT SERPL-MCNC: 4.55 MG/DL (ref 0.6–1.3)
CREAT SERPL-MCNC: 5.13 MG/DL (ref 0.6–1.3)
CROSSMATCH: NORMAL
ERYTHROCYTE [DISTWIDTH] IN BLOOD BY AUTOMATED COUNT: 14.4 % (ref 11.6–15.1)
GFR SERPL CREATININE-BSD FRML MDRD: 10 ML/MIN/1.73SQ M
GFR SERPL CREATININE-BSD FRML MDRD: 12 ML/MIN/1.73SQ M
GLUCOSE SERPL-MCNC: 124 MG/DL (ref 65–140)
GLUCOSE SERPL-MCNC: 132 MG/DL (ref 65–140)
GLUCOSE SERPL-MCNC: 144 MG/DL (ref 65–140)
GLUCOSE SERPL-MCNC: 151 MG/DL (ref 65–140)
GLUCOSE SERPL-MCNC: 192 MG/DL (ref 65–140)
GLUCOSE SERPL-MCNC: 204 MG/DL (ref 65–140)
HCT VFR BLD AUTO: 31.9 % (ref 36.5–49.3)
HGB BLD-MCNC: 10.5 G/DL (ref 12–17)
INR PPP: 1.3 (ref 0.84–1.19)
MAGNESIUM SERPL-MCNC: 2.8 MG/DL (ref 1.6–2.6)
MCH RBC QN AUTO: 30.4 PG (ref 26.8–34.3)
MCHC RBC AUTO-ENTMCNC: 32.9 G/DL (ref 31.4–37.4)
MCV RBC AUTO: 93 FL (ref 82–98)
PLATELET # BLD AUTO: 104 THOUSANDS/UL (ref 149–390)
PMV BLD AUTO: 11.6 FL (ref 8.9–12.7)
POTASSIUM SERPL-SCNC: 3.6 MMOL/L (ref 3.5–5.3)
POTASSIUM SERPL-SCNC: 4 MMOL/L (ref 3.5–5.3)
PROTHROMBIN TIME: 15.8 SECONDS (ref 11.6–14.5)
QRS AXIS: 53 DEGREES
QRSD INTERVAL: 90 MS
QT INTERVAL: 428 MS
QTC INTERVAL: 477 MS
RBC # BLD AUTO: 3.45 MILLION/UL (ref 3.88–5.62)
SODIUM SERPL-SCNC: 142 MMOL/L (ref 136–145)
SODIUM SERPL-SCNC: 142 MMOL/L (ref 136–145)
T WAVE AXIS: 20 DEGREES
UNIT DISPENSE STATUS: NORMAL
UNIT PRODUCT CODE: NORMAL
UNIT RH: NORMAL
VENTRICULAR RATE: 75 BPM
WBC # BLD AUTO: 17.15 THOUSAND/UL (ref 4.31–10.16)

## 2019-12-18 PROCEDURE — 85027 COMPLETE CBC AUTOMATED: CPT | Performed by: THORACIC SURGERY (CARDIOTHORACIC VASCULAR SURGERY)

## 2019-12-18 PROCEDURE — 93005 ELECTROCARDIOGRAM TRACING: CPT

## 2019-12-18 PROCEDURE — 99024 POSTOP FOLLOW-UP VISIT: CPT | Performed by: THORACIC SURGERY (CARDIOTHORACIC VASCULAR SURGERY)

## 2019-12-18 PROCEDURE — 85610 PROTHROMBIN TIME: CPT | Performed by: PHYSICIAN ASSISTANT

## 2019-12-18 PROCEDURE — 80048 BASIC METABOLIC PNL TOTAL CA: CPT | Performed by: PHYSICIAN ASSISTANT

## 2019-12-18 PROCEDURE — 83735 ASSAY OF MAGNESIUM: CPT | Performed by: THORACIC SURGERY (CARDIOTHORACIC VASCULAR SURGERY)

## 2019-12-18 PROCEDURE — 93010 ELECTROCARDIOGRAM REPORT: CPT | Performed by: INTERNAL MEDICINE

## 2019-12-18 PROCEDURE — 97116 GAIT TRAINING THERAPY: CPT

## 2019-12-18 PROCEDURE — 82948 REAGENT STRIP/BLOOD GLUCOSE: CPT

## 2019-12-18 RX ORDER — POTASSIUM CHLORIDE 29.8 MG/ML
40 INJECTION INTRAVENOUS ONCE
Status: COMPLETED | OUTPATIENT
Start: 2019-12-18 | End: 2019-12-19

## 2019-12-18 RX ORDER — WARFARIN SODIUM 5 MG/1
5 TABLET ORAL
Status: COMPLETED | OUTPATIENT
Start: 2019-12-18 | End: 2019-12-18

## 2019-12-18 RX ADMIN — ATORVASTATIN CALCIUM 80 MG: 80 TABLET, FILM COATED ORAL at 15:57

## 2019-12-18 RX ADMIN — INSULIN LISPRO 1 UNITS: 100 INJECTION, SOLUTION INTRAVENOUS; SUBCUTANEOUS at 06:13

## 2019-12-18 RX ADMIN — INSULIN LISPRO 1 UNITS: 100 INJECTION, SOLUTION INTRAVENOUS; SUBCUTANEOUS at 17:00

## 2019-12-18 RX ADMIN — Medication 10 MG/HR: at 11:48

## 2019-12-18 RX ADMIN — HEPARIN SODIUM 5000 UNITS: 5000 INJECTION INTRAVENOUS; SUBCUTANEOUS at 14:42

## 2019-12-18 RX ADMIN — METOPROLOL TARTRATE 12.5 MG: 25 TABLET ORAL at 13:40

## 2019-12-18 RX ADMIN — METOPROLOL TARTRATE 12.5 MG: 25 TABLET ORAL at 08:51

## 2019-12-18 RX ADMIN — AMIODARONE HYDROCHLORIDE 1 MG/MIN: 50 INJECTION, SOLUTION INTRAVENOUS at 14:31

## 2019-12-18 RX ADMIN — AMIODARONE HYDROCHLORIDE 200 MG: 200 TABLET ORAL at 21:49

## 2019-12-18 RX ADMIN — DEXTROSE 150 MG: 50 INJECTION, SOLUTION INTRAVENOUS at 14:27

## 2019-12-18 RX ADMIN — WARFARIN SODIUM 5 MG: 5 TABLET ORAL at 19:05

## 2019-12-18 RX ADMIN — ASPIRIN 325 MG ORAL TABLET 325 MG: 325 PILL ORAL at 08:52

## 2019-12-18 RX ADMIN — INSULIN LISPRO 1 UNITS: 100 INJECTION, SOLUTION INTRAVENOUS; SUBCUTANEOUS at 21:48

## 2019-12-18 RX ADMIN — AMIODARONE HYDROCHLORIDE 200 MG: 200 TABLET ORAL at 14:42

## 2019-12-18 RX ADMIN — AMIODARONE HYDROCHLORIDE 200 MG: 200 TABLET ORAL at 06:12

## 2019-12-18 RX ADMIN — HEPARIN SODIUM 5000 UNITS: 5000 INJECTION INTRAVENOUS; SUBCUTANEOUS at 21:48

## 2019-12-18 RX ADMIN — POTASSIUM CHLORIDE 40 MEQ: 29.8 INJECTION, SOLUTION INTRAVENOUS at 11:40

## 2019-12-18 RX ADMIN — MUPIROCIN 1 APPLICATION: 20 OINTMENT TOPICAL at 08:51

## 2019-12-18 RX ADMIN — PANTOPRAZOLE SODIUM 40 MG: 40 TABLET, DELAYED RELEASE ORAL at 08:51

## 2019-12-18 RX ADMIN — HEPARIN SODIUM 5000 UNITS: 5000 INJECTION INTRAVENOUS; SUBCUTANEOUS at 06:12

## 2019-12-18 NOTE — PHYSICAL THERAPY NOTE
Physical Therapy Progress Note     12/18/19 0841   Pain Assessment   Pain Assessment 0-10   Pain Score No Pain   Diversional Activities Television   Restrictions/Precautions   Weight Bearing Precautions Per Order No   Other Precautions Cardiac/sternal;Telemetry;Multiple lines;O2;Fall Risk   General   Chart Reviewed Yes   Response to Previous Treatment Patient with no complaints from previous session  Family/Caregiver Present No   Cognition   Overall Cognitive Status WFL   Arousal/Participation Alert; Responsive; Cooperative   Attention Within functional limits   Orientation Level Oriented X4   Memory Within functional limits   Following Commands Follows all commands and directions without difficulty   Transfers   Sit to Stand 5  Supervision   Additional items Armrests; Increased time required;Verbal cues; Assist x 1   Stand to Sit 5  Supervision   Additional items Assist x 1; Armrests; Increased time required;Verbal cues   Ambulation/Elevation   Gait pattern Decreased foot clearance; Excessively slow; Short stride   Gait Assistance 5  Supervision   Additional items Assist x 1;Verbal cues   Assistive Device Rolling walker   Distance 500'   Activity Tolerance   Activity Tolerance Patient tolerated treatment well   Nurse Made Aware SKINNY Conklin ok to see   Assessment   Prognosis Good   Problem List Decreased endurance;Decreased mobility   Assessment Pt seated in relciner upon arrival, pleasant and agreeable to ambualtion  Pt requires supervision only for all sit to stand transfers, once standing pt ambualtes with supervision and VCs to stay within VALERIE of RW, pt ambulates 500' requires 2 standing rest breaks and cuing on pursed lips breathing  Pt is progressing well and will continue to benenfit from skilled PT to increase strength and endurance to maximize safe fucntional mobility  Barriers to Discharge None   Goals   Patient Goals to go home   STG Expiration Date 12/26/19   Short Term Goal #1 7-10 days   Pt will amb 300 ft w/ least restrictive assistive device PRN, mod (I) in order to facilitate safe return to premorbid environment and community amb status  Pt will achieve (I) level w/ bed mob in order to facilitate safety with OOB and back to bed transitions in own living environment  Pt will perform transfers w/ mod (I) to assure (I) and safety w/ functional mobility/transitions w/ all aspects of mobility/locomotion  Pt will participate in LE therex and balance activities to max progression w/ mobility skills  Plan   Treatment/Interventions Functional transfer training;LE strengthening/ROM; Therapeutic exercise; Endurance training;Bed mobility;Gait training;Patient/family training   Progress Progressing toward goals   PT Frequency   (4-6x/week)   Recommendation   Recommendation Home PT; Home with family support   Equipment Recommended Walker   PT - OK to Discharge Yes  (when medically ready)     Michael Saunders PTA

## 2019-12-18 NOTE — PROGRESS NOTES
Pastoral Care Progress Note    2019  Patient: Carroll Dove : 1940  Admission Date & Time: 2019  MRN: [id number] CSN: [id number]                     Chaplaincy Interventions Utilized:   Empowerment: Encouraged self-care and Provided chaplaincy education    Exploration: Explored spiritual needs & resources    Collaboration:     Relationship Building: Cultivated a relationship of care and support    Ritual:     Chaplaincy Outcomes Achieved:  Expressed peace, Identified meaningful connections and Improved communication    Spiritual Coping Strategies Utilized:   Spiritual meaning and No spiritual coping       19 1100   Stress Factors   Patient Stress Factors None identified   Coping Responses   Patient Coping Accepting   Plan of Care   Assessment Completed by: Unit visit

## 2019-12-18 NOTE — PLAN OF CARE
Problem: PHYSICAL THERAPY ADULT  Goal: Performs mobility at highest level of function for planned discharge setting  See evaluation for individualized goals  Description  Treatment/Interventions: Functional transfer training, LE strengthening/ROM, Therapeutic exercise, Endurance training, Bed mobility, Gait training, Spoke to nursing, OT  Equipment Recommended: Walker(at this time)       See flowsheet documentation for full assessment, interventions and recommendations  Outcome: Progressing  Note:   Prognosis: Good  Problem List: Decreased endurance, Decreased mobility  Assessment: Pt seated in relciner upon arrival, pleasant and agreeable to ambualtion  Pt requires supervision only for all sit to stand transfers, once standing pt ambualtes with supervision and VCs to stay within VALERIE of RW, pt ambulates 500' requires 2 standing rest breaks and cuing on pursed lips breathing  Pt is progressing well and will continue to benenfit from skilled PT to increase strength and endurance to maximize safe fucntional mobility  Barriers to Discharge: None     Recommendation: Home PT, Home with family support     PT - OK to Discharge: Yes(when medically ready)    See flowsheet documentation for full assessment

## 2019-12-18 NOTE — PLAN OF CARE
Problem: Potential for Falls  Goal: Patient will remain free of falls  Description  INTERVENTIONS:  - Assess patient frequently for physical needs  -  Identify cognitive and physical deficits and behaviors that affect risk of falls    -  [place] fall precautions as indicated by assessment   - Educate patient/family on patient safety including physical limitations  - Instruct patient to call for assistance with activity based on assessment  - Modify environment to reduce risk of injury  - Consider OT/PT consult to assist with strengthening/mobility  12/18/2019 0912 by Bridger Lino RN  Outcome: Progressing  12/18/2019 0911 by Bridger Lino RN  Outcome: Progressing     Problem: PAIN - ADULT  Goal: Verbalizes/displays adequate comfort level or baseline comfort level  Description  Interventions:  - Encourage patient to monitor pain and request assistance  - Assess pain using appropriate pain scale  - Administer analgesics based on type and severity of pain and evaluate response  - Implement non-pharmacological measures as appropriate and evaluate response  - Consider cultural and social influences on pain and pain management  - Notify physician/advanced practitioner if interventions unsuccessful or patient reports new pain  12/18/2019 0912 by Bridger Lino RN  Outcome: Progressing  12/18/2019 0911 by Bridger Lino RN  Outcome: Progressing     Problem: INFECTION - ADULT  Goal: Absence or prevention of progression during hospitalization  Description  INTERVENTIONS:  - Assess and monitor for signs and symptoms of infection  - Monitor lab/diagnostic results  - Monitor all insertion sites, i e  indwelling lines, tubes, and drains  - Monitor endotracheal if appropriate and nasal secretions for changes in amount and color  - [place] appropriate cooling/warming therapies per order  - Administer medications as ordered  - Instruct and encourage patient and family to use good hand hygiene technique  - Identify and instruct in appropriate isolation precautions for identified infection/condition  12/18/2019 0912 by Ayla Lau RN  Outcome: Progressing  12/18/2019 0911 by Ayla Lau RN  Outcome: Progressing     Problem: SAFETY ADULT  Goal: Maintain or return to baseline ADL function  Description  INTERVENTIONS:  -  Assess patient's ability to carry out ADLs; assess patient's baseline for ADL function and identify physical deficits which impact ability to perform ADLs (bathing, care of mouth/teeth, toileting, grooming, dressing, etc )  - Assess/evaluate cause of self-care deficits   - Assess range of motion  - Assess patient's mobility; develop plan if impaired  - Assess patient's need for assistive devices and provide as appropriate  - Encourage maximum independence but intervene and supervise when necessary  - Involve family in performance of ADLs  - Assess for home care needs following discharge   - Consider OT consult to assist with ADL evaluation and planning for discharge  - Provide patient education as appropriate  12/18/2019 0912 by Ayla Lau RN  Outcome: Progressing  12/18/2019 0911 by Ayla Lau RN  Outcome: Progressing  Goal: Maintain or return mobility status to optimal level  Description  INTERVENTIONS:  - Assess patient's baseline mobility status (ambulation, transfers, stairs, etc )    - Identify cognitive and physical deficits and behaviors that affect mobility  - Identify mobility aids required to assist with transfers and/or ambulation (gait belt, sit-to-stand, lift, walker, cane, etc )  - [place] fall precautions as indicated by assessment  - Record patient progress and toleration of activity level on Mobility SBAR; progress patient to next Phase/Stage  - Instruct patient to call for assistance with activity based on assessment  - Consider rehabilitation consult to assist with strengthening/weightbearing, etc   12/18/2019 0912 by Ayla Lau RN  Outcome: Progressing  12/18/2019 0911 by Dee Wilde RN  Outcome: Progressing     Problem: DISCHARGE PLANNING  Goal: Discharge to home or other facility with appropriate resources  Description  INTERVENTIONS:  - Identify barriers to discharge w/patient and caregiver  - Arrange for needed discharge resources and transportation as appropriate  - Identify discharge learning needs (meds, wound care, etc )  - Arrange for interpretive services to assist at discharge as needed  - Refer to Case Management Department for coordinating discharge planning if the patient needs post-hospital services based on physician/advanced practitioner order or complex needs related to functional status, cognitive ability, or social support system  12/18/2019 0912 by Dee Wilde RN  Outcome: Progressing  12/18/2019 0911 by Dee Wilde RN  Outcome: Progressing     Problem: Knowledge Deficit  Goal: Patient/family/caregiver demonstrates understanding of disease process, treatment plan, medications, and discharge instructions  Description  Complete learning assessment and assess knowledge base    Interventions:  - Provide teaching at level of understanding  - Provide teaching via preferred learning methods  12/18/2019 0912 by Dee Wilde RN  Outcome: Progressing  12/18/2019 0911 by Dee Wilde RN  Outcome: Progressing     Problem: NEUROSENSORY - ADULT  Goal: Achieves stable or improved neurological status  Description  INTERVENTIONS  - Monitor and report changes in neurological status  - Monitor vital signs such as temperature, blood pressure, glucose, and any other labs ordered   - Initiate measures to prevent increased intracranial pressure  - Monitor for seizure activity and implement precautions if appropriate      12/18/2019 0912 by Dee Wilde RN  Outcome: Progressing  12/18/2019 0911 by Dee Wilde RN  Outcome: Progressing  Goal: Remains free of injury related to seizures activity  Description  INTERVENTIONS  - Maintain airway, patient safety  and administer oxygen as ordered  - Monitor patient for seizure activity, document and report duration and description of seizure to physician/advanced practitioner  - If seizure occurs,  ensure patient safety during seizure  - Reorient patient post seizure  - Seizure pads on all 4 side rails  - Instruct patient/family to notify RN of any seizure activity including if an aura is experienced  - Instruct patient/family to call for assistance with activity based on nursing assessment  - Administer anti-seizure medications if ordered    12/18/2019 0912 by Jarad South RN  Outcome: Progressing  12/18/2019 0911 by Jarad South RN  Outcome: Progressing  Goal: Achieves maximal functionality and self care  Description  INTERVENTIONS  - Monitor swallowing and airway patency with patient fatigue and changes in neurological status  - Encourage and assist patient to increase activity and self care     - Encourage visually impaired, hearing impaired and aphasic patients to use assistive/communication devices  12/18/2019 0912 by Jarad South RN  Outcome: Progressing  12/18/2019 0911 by Jarad South RN  Outcome: Progressing     Problem: CARDIOVASCULAR - ADULT  Goal: Maintains optimal cardiac output and hemodynamic stability  Description  INTERVENTIONS:  - Monitor I/O, vital signs and rhythm  - Monitor for S/S and trends of decreased cardiac output  - Administer and titrate ordered vasoactive medications to optimize hemodynamic stability  - Assess quality of pulses, skin color and temperature  - Assess for signs of decreased coronary artery perfusion  - Instruct patient to report change in severity of symptoms  12/18/2019 0912 by Jarad South RN  Outcome: Progressing  12/18/2019 0911 by Jarad South RN  Outcome: Progressing  Goal: Absence of cardiac dysrhythmias or at baseline rhythm  Description  INTERVENTIONS:  - Continuous cardiac monitoring, vital signs, obtain 12 lead EKG if ordered  - Administer antiarrhythmic and heart rate control medications as ordered  - Monitor electrolytes and administer replacement therapy as ordered  12/18/2019 0912 by Deyanira Frey RN  Outcome: Progressing  12/18/2019 0911 by Deyanira Frey RN  Outcome: Progressing     Problem: RESPIRATORY - ADULT  Goal: Achieves optimal ventilation and oxygenation  Description  INTERVENTIONS:  - Assess for changes in respiratory status  - Assess for changes in mentation and behavior  - Position to facilitate oxygenation and minimize respiratory effort  - Oxygen administered by appropriate delivery if ordered  - Initiate smoking cessation education as indicated  - Encourage broncho-pulmonary hygiene including cough, deep breathe, Incentive Spirometry  - Assess the need for suctioning and aspirate as needed  - Assess and instruct to report SOB or any respiratory difficulty  - Respiratory Therapy support as indicated  12/18/2019 0912 by Deyanira Frey RN  Outcome: Progressing  12/18/2019 0911 by Deyanira Frey RN  Outcome: Progressing     Problem: Prexisting or High Potential for Compromised Skin Integrity  Goal: Skin integrity is maintained or improved  Description  INTERVENTIONS:  - Identify patients at risk for skin breakdown  - Assess and monitor skin integrity  - Assess and monitor nutrition and hydration status  - Monitor labs   - Assess for incontinence   - Turn and reposition patient  - Assist with mobility/ambulation  - Relieve pressure over bony prominences  - Avoid friction and shearing  - Provide appropriate hygiene as needed including keeping skin clean and dry  - Evaluate need for skin moisturizer/barrier cream  - Collaborate with interdisciplinary team   - Patient/family teaching  - Consider wound care consult   12/18/2019 0912 by Deyanira Frey RN  Outcome: Progressing  12/18/2019 0911 by Deyanira Frey RN  Outcome: Progressing

## 2019-12-18 NOTE — PLAN OF CARE
Problem: Potential for Falls  Goal: Patient will remain free of falls  Description  INTERVENTIONS:  - Assess patient frequently for physical needs  -  Identify cognitive and physical deficits and behaviors that affect risk of falls    -  [place] fall precautions as indicated by assessment   - Educate patient/family on patient safety including physical limitations  - Instruct patient to call for assistance with activity based on assessment  - Modify environment to reduce risk of injury  - Consider OT/PT consult to assist with strengthening/mobility  Outcome: Progressing     Problem: PAIN - ADULT  Goal: Verbalizes/displays adequate comfort level or baseline comfort level  Description  Interventions:  - Encourage patient to monitor pain and request assistance  - Assess pain using appropriate pain scale  - Administer analgesics based on type and severity of pain and evaluate response  - Implement non-pharmacological measures as appropriate and evaluate response  - Consider cultural and social influences on pain and pain management  - Notify physician/advanced practitioner if interventions unsuccessful or patient reports new pain  Outcome: Progressing     Problem: INFECTION - ADULT  Goal: Absence or prevention of progression during hospitalization  Description  INTERVENTIONS:  - Assess and monitor for signs and symptoms of infection  - Monitor lab/diagnostic results  - Monitor all insertion sites, i e  indwelling lines, tubes, and drains  - Monitor endotracheal if appropriate and nasal secretions for changes in amount and color  - [place] appropriate cooling/warming therapies per order  - Administer medications as ordered  - Instruct and encourage patient and family to use good hand hygiene technique  - Identify and instruct in appropriate isolation precautions for identified infection/condition  Outcome: Progressing     Problem: SAFETY ADULT  Goal: Maintain or return to baseline ADL function  Description  INTERVENTIONS:  -  Assess patient's ability to carry out ADLs; assess patient's baseline for ADL function and identify physical deficits which impact ability to perform ADLs (bathing, care of mouth/teeth, toileting, grooming, dressing, etc )  - Assess/evaluate cause of self-care deficits   - Assess range of motion  - Assess patient's mobility; develop plan if impaired  - Assess patient's need for assistive devices and provide as appropriate  - Encourage maximum independence but intervene and supervise when necessary  - Involve family in performance of ADLs  - Assess for home care needs following discharge   - Consider OT consult to assist with ADL evaluation and planning for discharge  - Provide patient education as appropriate  Outcome: Progressing  Goal: Maintain or return mobility status to optimal level  Description  INTERVENTIONS:  - Assess patient's baseline mobility status (ambulation, transfers, stairs, etc )    - Identify cognitive and physical deficits and behaviors that affect mobility  - Identify mobility aids required to assist with transfers and/or ambulation (gait belt, sit-to-stand, lift, walker, cane, etc )  - [place] fall precautions as indicated by assessment  - Record patient progress and toleration of activity level on Mobility SBAR; progress patient to next Phase/Stage  - Instruct patient to call for assistance with activity based on assessment  - Consider rehabilitation consult to assist with strengthening/weightbearing, etc   Outcome: Progressing     Problem: DISCHARGE PLANNING  Goal: Discharge to home or other facility with appropriate resources  Description  INTERVENTIONS:  - Identify barriers to discharge w/patient and caregiver  - Arrange for needed discharge resources and transportation as appropriate  - Identify discharge learning needs (meds, wound care, etc )  - Arrange for interpretive services to assist at discharge as needed  - Refer to Case Management Department for coordinating discharge planning if the patient needs post-hospital services based on physician/advanced practitioner order or complex needs related to functional status, cognitive ability, or social support system  Outcome: Progressing     Problem: Knowledge Deficit  Goal: Patient/family/caregiver demonstrates understanding of disease process, treatment plan, medications, and discharge instructions  Description  Complete learning assessment and assess knowledge base    Interventions:  - Provide teaching at level of understanding  - Provide teaching via preferred learning methods  Outcome: Progressing     Problem: NEUROSENSORY - ADULT  Goal: Achieves stable or improved neurological status  Description  INTERVENTIONS  - Monitor and report changes in neurological status  - Monitor vital signs such as temperature, blood pressure, glucose, and any other labs ordered   - Initiate measures to prevent increased intracranial pressure  - Monitor for seizure activity and implement precautions if appropriate      Outcome: Progressing  Goal: Remains free of injury related to seizures activity  Description  INTERVENTIONS  - Maintain airway, patient safety  and administer oxygen as ordered  - Monitor patient for seizure activity, document and report duration and description of seizure to physician/advanced practitioner  - If seizure occurs,  ensure patient safety during seizure  - Reorient patient post seizure  - Seizure pads on all 4 side rails  - Instruct patient/family to notify RN of any seizure activity including if an aura is experienced  - Instruct patient/family to call for assistance with activity based on nursing assessment  - Administer anti-seizure medications if ordered    Outcome: Progressing  Goal: Achieves maximal functionality and self care  Description  INTERVENTIONS  - Monitor swallowing and airway patency with patient fatigue and changes in neurological status  - Encourage and assist patient to increase activity and self care     - Encourage visually impaired, hearing impaired and aphasic patients to use assistive/communication devices  Outcome: Progressing     Problem: CARDIOVASCULAR - ADULT  Goal: Maintains optimal cardiac output and hemodynamic stability  Description  INTERVENTIONS:  - Monitor I/O, vital signs and rhythm  - Monitor for S/S and trends of decreased cardiac output  - Administer and titrate ordered vasoactive medications to optimize hemodynamic stability  - Assess quality of pulses, skin color and temperature  - Assess for signs of decreased coronary artery perfusion  - Instruct patient to report change in severity of symptoms  Outcome: Progressing  Goal: Absence of cardiac dysrhythmias or at baseline rhythm  Description  INTERVENTIONS:  - Continuous cardiac monitoring, vital signs, obtain 12 lead EKG if ordered  - Administer antiarrhythmic and heart rate control medications as ordered  - Monitor electrolytes and administer replacement therapy as ordered  Outcome: Progressing     Problem: RESPIRATORY - ADULT  Goal: Achieves optimal ventilation and oxygenation  Description  INTERVENTIONS:  - Assess for changes in respiratory status  - Assess for changes in mentation and behavior  - Position to facilitate oxygenation and minimize respiratory effort  - Oxygen administered by appropriate delivery if ordered  - Initiate smoking cessation education as indicated  - Encourage broncho-pulmonary hygiene including cough, deep breathe, Incentive Spirometry  - Assess the need for suctioning and aspirate as needed  - Assess and instruct to report SOB or any respiratory difficulty  - Respiratory Therapy support as indicated  Outcome: Progressing     Problem: Prexisting or High Potential for Compromised Skin Integrity  Goal: Skin integrity is maintained or improved  Description  INTERVENTIONS:  - Identify patients at risk for skin breakdown  - Assess and monitor skin integrity  - Assess and monitor nutrition and hydration status  - Monitor labs   - Assess for incontinence   - Turn and reposition patient  - Assist with mobility/ambulation  - Relieve pressure over bony prominences  - Avoid friction and shearing  - Provide appropriate hygiene as needed including keeping skin clean and dry  - Evaluate need for skin moisturizer/barrier cream  - Collaborate with interdisciplinary team   - Patient/family teaching  - Consider wound care consult   Outcome: Progressing

## 2019-12-18 NOTE — PROGRESS NOTES
Progress Note - Cardiothoracic Surgery   [facility] A Core 70 y o  male MRN: [id number]  Unit/Bed#: 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, Ascending aortic aneurysm     S/P Replacement of ascending aorta with aggressive hemiarch reconstruction and aortic valve resuspension with a 26 mm Dacron graft; POD # 3      24 Hour Events: Remains on lasix drip at 30  UOP 3 2L/24hrs  On supplemental oxygen      Medications:   Scheduled Meds:  Current Facility-Administered Medications:  acetaminophen 650 mg Oral Q4H PRN Opal Salazar PA-C    amiodarone 200 mg Oral [place], [place]    aspirin 325 mg Oral Daily [facility] Kailash [place]    atorvastatin 80 mg Oral Daily With [employer] NEETU Linder PA-C    bisacodyl 10 mg Rectal Daily PRN Maciel Linder PA-C    docusate sodium 100 mg Oral BID Maciel Linder PA-C    furosemide 30 mg/hr Intravenous Continuous [place], [place] Last Rate: 30 mg/hr (19)   heparin (porcine) 5,000 Units Subcutaneous [facility] Alex Linder PA-C    insulin lispro 1-5 Units Subcutaneous TID [facility] Alex Linder PA-C    insulin lispro 1-5 Units Subcutaneous HS Alex Linder PA-C    metoprolol tartrate 12 5 mg Oral Q12H [facility] Opal Salazar PA-C    mupirocin 1 application Nasal N56Q [facility] Alex  TRINY Linder    ondansetron 4 mg Intravenous Q6H PRN Opal Salazar PA-C    oxyCODONE-acetaminophen 1 tablet Oral Q4H PRN Opal Salazar PA-C    oxyCODONE-acetaminophen 2 tablet Oral Q6H PRN Maciel Linder PA-C    pantoprazole 40 mg Oral Daily Alex Linder PA-C    polyethylene glycol 17 g Oral Daily Maciel Linder PA-C    temazepam 15 mg Oral HS PRN Maciel Linder PA-C      Continuous Infusions:  furosemide 30 mg/hr Last Rate: 30 mg/hr (19)     PRN Meds:   acetaminophen    bisacodyl    ondansetron    oxyCODONE-acetaminophen    oxyCODONE-acetaminophen    temazepam    Vitals:   Vitals:    19 0600 19 0723 19 0800 19 0851   BP:  140/79 BP Location:  Left arm     Pulse:  75  83   Resp:  18     Temp:  97 8 °F (36 6 °C)     TempSrc:  Oral     SpO2:  93% 96%    Weight: 83 kg (182 lb 15 7 oz)      Height:           Telemetry: Irregular rhythm; Heart Rate: 77    Respiratory:   SpO2: SpO2: 96 %; 2 LPM    Intake/Output:     Intake/Output Summary (Last 24 hours) at 12/18/2019 0944  Last data filed at 12/18/2019 5750  Gross per 24 hour   Intake 360 ml   Output 3030 ml   Net -2670 ml   UOP 3 2L/24hrs        Weights:   Weight (last 2 days)     Date/Time   Weight    12/18/19 0600   83 (182 98)    12/17/19 0544   86 8 (191 36)    12/16/19 0600   83 5 (184 08)            Admit weight: 79 4 kg    Results:   Results from last 7 days   Lab Units 12/18/19  0646 12/17/19  0433 12/16/19  0356   WBC Thousand/uL 17 15* 19 72* 21 18*   HEMOGLOBIN g/dL 10 5* 10 3* 11 3*   HEMATOCRIT % 31 9* 31 5* 34 4*   PLATELETS Thousands/uL 104* 86* 147*     Results from last 7 days   Lab Units 12/18/19  0358 12/17/19  0813 12/17/19  0433  12/15/19  1619   SODIUM mmol/L 142 142 144   < >  --    POTASSIUM mmol/L 3 6 4 0 4 1   < >  --    CHLORIDE mmol/L 106 108 110*   < >  --    CO2 mmol/L 24 23 24   < >  --    CO2, I-STAT mmol/L  --   --   --   --  24   BUN mg/dL 103* 79* 71*   < >  --    CREATININE mg/dL 4 55* 4 09* 3 97*   < >  --    GLUCOSE, ISTAT mg/dl  --   --   --   --  95   CALCIUM mg/dL 8 1* 7 7* 7 9*   < >  --     < > = values in this interval not displayed       Results from last 7 days   Lab Units 12/17/19  0433 12/15/19  1607 12/15/19  0354   INR  1 48* 1 75* 1 27*   PTT seconds  --  41*  --      Point of care glucose: 132-192    Studies:  No new studies    I have personally reviewed pertinent films in PACS    Invasive Lines/Tubes:  Invasive Devices     Central Venous Catheter Line            CVC Central Lines 12/15/19 Triple 3 days          Peripheral Intravenous Line            Peripheral IV 12/14/19 Right Antecubital 3 days    Peripheral IV 12/15/19 Right Antecubital 3 days    Peripheral IV 12/15/19 Right Wrist 3 days          Drain            Urethral Catheter Non-latex; Temperature probe 3 days                Physical Exam:    HEENT/NECK:  PERRLA  No jugular venous distention  Cardiac: Irregularly irregular rate and rhythm and No murmurs/rubs/gallops  Pulmonary:  Breath sounds clear bilaterally and No rales/rhonchi/wheezes  Abdomen:  Non-tender, Non-distended and Normal bowel sounds  Incisions: Sternum is stable  Incision is clean, dry, and intact  Extremities: Extremities warm/dry and Trace edema B/L  Neuro: Alert and oriented X 3, Sensation is grossly intact and No focal deficits  Skin: Warm/Dry, without rashes or lesions  Assessment:  Principal Problem:    Dissection of thoracic aorta (HCC)  Active Problems:    Benign essential hypertension    Abnormal TSH    Benign hypertension with CKD (chronic kidney disease) stage III (HCC)    Benign prostatic hyperplasia without lower urinary tract symptoms    S/P aorta repair       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, Ascending aortic aneurysm     S/P Replacement of ascending aorta with aggressive hemiarch reconstruction and aortic valve resuspension with a 26 mm Dacron graft; POD # 3    Plan:    1  Cardiac:   Irregular rhythm; HR/BP well-controlled  EKG ordered  Lopressor, 12 5mg PO BID  Continue ASA and Statin therapy  Epicardial pacing wires out  Maintain central IV access today for medication access  Continue DVT prophylaxis    2  Pulmonary:   Acute post-op pulmonary insufficiency; Requiring 2 Liters via nasal cannula, secondary to poor inspiratory effort secondary to pain  Continue incentive spirometry/coughing/deep breathing exercises  Wean supplemental oxygen as tolerated for saturation > 90%  Chest tubes have been discontinued    3   Renal:   Intake/Output net: -2 6 mL/24 hours  Continue diuresis   Lasix 30 mg/hr, decrease today  Potassium Chloride 40 mEq PO QD  Post op Creatinine selevated, Cr 4 55; Follow up labs prn    4  Neuro:  Neurologically intact; No active issues  Incisional pain well-controlled; Continue prn Percocet    5  GI:  Tolerating TLC 2 3 gm sodium diet  Maintain 1800 mL daily fluid restriction   Continue stool softeners and prn suppository  Continue GI prophylaxis    6  Endo:   No history of diabetes; Glucose well-controlled with sliding scale coverage    7    Hematology:    Post-operative acute blood loss anemia; Hemoglobin 10 5; trend prn    8  Disposition:      Not yet ambulating independently, Anticipate discharge to home when medically ready     VTE Pharmacologic Prophylaxis: Heparin  VTE Mechanical Prophylaxis: sequential compression device    Collaborative rounds completed with NEETU Woo    Plan of care discussed with bedside nurse    SIGNATURE: Vern Salas PA-C  DATE: December 18, 2019  TIME: 9:30 AM

## 2019-12-18 NOTE — RESTORATIVE TECHNICIAN NOTE
Restorative Specialist Mobility Note       Activity: Ambulate in orosco     Assistive Device: Front wheel walker

## 2019-12-19 ENCOUNTER — APPOINTMENT (INPATIENT)
Dept: RADIOLOGY | Facility: HOSPITAL | Age: 79
DRG: 219 | End: 2019-12-19
Payer: COMMERCIAL

## 2019-12-19 ENCOUNTER — TELEPHONE (OUTPATIENT)
Dept: FAMILY MEDICINE CLINIC | Facility: CLINIC | Age: 79
End: 2019-12-19

## 2019-12-19 PROBLEM — E87.6 HYPOKALEMIA: Status: ACTIVE | Noted: 2019-12-19

## 2019-12-19 PROBLEM — D69.6 THROMBOCYTOPENIA (HCC): Status: ACTIVE | Noted: 2019-12-19

## 2019-12-19 PROBLEM — D72.829 LEUKOCYTOSIS: Status: ACTIVE | Noted: 2019-12-19

## 2019-12-19 PROBLEM — D62 POSTOPERATIVE ANEMIA DUE TO ACUTE BLOOD LOSS: Status: ACTIVE | Noted: 2019-12-19

## 2019-12-19 LAB
ANION GAP SERPL CALCULATED.3IONS-SCNC: 12 MMOL/L (ref 4–13)
BUN SERPL-MCNC: 124 MG/DL (ref 5–25)
CALCIUM SERPL-MCNC: 8 MG/DL (ref 8.3–10.1)
CHLORIDE SERPL-SCNC: 101 MMOL/L (ref 100–108)
CO2 SERPL-SCNC: 28 MMOL/L (ref 21–32)
CREAT SERPL-MCNC: 5.1 MG/DL (ref 0.6–1.3)
ERYTHROCYTE [DISTWIDTH] IN BLOOD BY AUTOMATED COUNT: 14.2 % (ref 11.6–15.1)
GFR SERPL CREATININE-BSD FRML MDRD: 10 ML/MIN/1.73SQ M
GLUCOSE SERPL-MCNC: 112 MG/DL (ref 65–140)
GLUCOSE SERPL-MCNC: 116 MG/DL (ref 65–140)
GLUCOSE SERPL-MCNC: 121 MG/DL (ref 65–140)
GLUCOSE SERPL-MCNC: 137 MG/DL (ref 65–140)
GLUCOSE SERPL-MCNC: 149 MG/DL (ref 65–140)
HCT VFR BLD AUTO: 31.5 % (ref 36.5–49.3)
HGB BLD-MCNC: 10.4 G/DL (ref 12–17)
INR PPP: 1.4 (ref 0.84–1.19)
MCH RBC QN AUTO: 30.5 PG (ref 26.8–34.3)
MCHC RBC AUTO-ENTMCNC: 33 G/DL (ref 31.4–37.4)
MCV RBC AUTO: 92 FL (ref 82–98)
PLATELET # BLD AUTO: 111 THOUSANDS/UL (ref 149–390)
PMV BLD AUTO: 12 FL (ref 8.9–12.7)
POTASSIUM SERPL-SCNC: 3.2 MMOL/L (ref 3.5–5.3)
PROTHROMBIN TIME: 16.7 SECONDS (ref 11.6–14.5)
RBC # BLD AUTO: 3.41 MILLION/UL (ref 3.88–5.62)
SODIUM SERPL-SCNC: 141 MMOL/L (ref 136–145)
WBC # BLD AUTO: 13.95 THOUSAND/UL (ref 4.31–10.16)

## 2019-12-19 PROCEDURE — 99024 POSTOP FOLLOW-UP VISIT: CPT | Performed by: THORACIC SURGERY (CARDIOTHORACIC VASCULAR SURGERY)

## 2019-12-19 PROCEDURE — 85027 COMPLETE CBC AUTOMATED: CPT | Performed by: PHYSICIAN ASSISTANT

## 2019-12-19 PROCEDURE — 71046 X-RAY EXAM CHEST 2 VIEWS: CPT

## 2019-12-19 PROCEDURE — 82948 REAGENT STRIP/BLOOD GLUCOSE: CPT

## 2019-12-19 PROCEDURE — 93005 ELECTROCARDIOGRAM TRACING: CPT

## 2019-12-19 PROCEDURE — 80048 BASIC METABOLIC PNL TOTAL CA: CPT | Performed by: PHYSICIAN ASSISTANT

## 2019-12-19 PROCEDURE — 85610 PROTHROMBIN TIME: CPT | Performed by: PHYSICIAN ASSISTANT

## 2019-12-19 PROCEDURE — 99222 1ST HOSP IP/OBS MODERATE 55: CPT | Performed by: INTERNAL MEDICINE

## 2019-12-19 PROCEDURE — 99232 SBSQ HOSP IP/OBS MODERATE 35: CPT | Performed by: INTERNAL MEDICINE

## 2019-12-19 RX ORDER — POTASSIUM CHLORIDE 29.8 MG/ML
40 INJECTION INTRAVENOUS ONCE
Status: COMPLETED | OUTPATIENT
Start: 2019-12-19 | End: 2019-12-19

## 2019-12-19 RX ORDER — FUROSEMIDE 10 MG/ML
40 INJECTION INTRAMUSCULAR; INTRAVENOUS
Status: DISCONTINUED | OUTPATIENT
Start: 2019-12-19 | End: 2019-12-19

## 2019-12-19 RX ORDER — FUROSEMIDE 10 MG/ML
40 INJECTION INTRAMUSCULAR; INTRAVENOUS
Status: DISCONTINUED | OUTPATIENT
Start: 2019-12-20 | End: 2019-12-19

## 2019-12-19 RX ORDER — WARFARIN SODIUM 5 MG/1
5 TABLET ORAL
Status: COMPLETED | OUTPATIENT
Start: 2019-12-19 | End: 2019-12-19

## 2019-12-19 RX ORDER — POTASSIUM CHLORIDE 20 MEQ/1
40 TABLET, EXTENDED RELEASE ORAL DAILY
Status: DISCONTINUED | OUTPATIENT
Start: 2019-12-20 | End: 2019-12-23

## 2019-12-19 RX ORDER — POTASSIUM CHLORIDE 20 MEQ/1
40 TABLET, EXTENDED RELEASE ORAL 2 TIMES DAILY
Status: DISCONTINUED | OUTPATIENT
Start: 2019-12-19 | End: 2019-12-19

## 2019-12-19 RX ADMIN — AMIODARONE HYDROCHLORIDE 200 MG: 200 TABLET ORAL at 21:37

## 2019-12-19 RX ADMIN — POTASSIUM CHLORIDE 40 MEQ: 29.8 INJECTION, SOLUTION INTRAVENOUS at 10:20

## 2019-12-19 RX ADMIN — MUPIROCIN 1 APPLICATION: 20 OINTMENT TOPICAL at 21:37

## 2019-12-19 RX ADMIN — AMIODARONE HYDROCHLORIDE 200 MG: 200 TABLET ORAL at 05:58

## 2019-12-19 RX ADMIN — PANTOPRAZOLE SODIUM 40 MG: 40 TABLET, DELAYED RELEASE ORAL at 08:42

## 2019-12-19 RX ADMIN — HEPARIN SODIUM 5000 UNITS: 5000 INJECTION INTRAVENOUS; SUBCUTANEOUS at 06:00

## 2019-12-19 RX ADMIN — DOCUSATE SODIUM 100 MG: 100 CAPSULE, LIQUID FILLED ORAL at 08:42

## 2019-12-19 RX ADMIN — OXYCODONE HYDROCHLORIDE AND ACETAMINOPHEN 2 TABLET: 5; 325 TABLET ORAL at 20:33

## 2019-12-19 RX ADMIN — MUPIROCIN 1 APPLICATION: 20 OINTMENT TOPICAL at 08:42

## 2019-12-19 RX ADMIN — DOCUSATE SODIUM 100 MG: 100 CAPSULE, LIQUID FILLED ORAL at 17:51

## 2019-12-19 RX ADMIN — POTASSIUM CHLORIDE 40 MEQ: 1500 TABLET, EXTENDED RELEASE ORAL at 10:19

## 2019-12-19 RX ADMIN — HEPARIN SODIUM 5000 UNITS: 5000 INJECTION INTRAVENOUS; SUBCUTANEOUS at 21:38

## 2019-12-19 RX ADMIN — POLYETHYLENE GLYCOL 3350 17 G: 17 POWDER, FOR SOLUTION ORAL at 08:42

## 2019-12-19 RX ADMIN — METOPROLOL TARTRATE 25 MG: 25 TABLET ORAL at 08:42

## 2019-12-19 RX ADMIN — ATORVASTATIN CALCIUM 80 MG: 80 TABLET, FILM COATED ORAL at 17:52

## 2019-12-19 RX ADMIN — AMIODARONE HYDROCHLORIDE 200 MG: 200 TABLET ORAL at 14:13

## 2019-12-19 RX ADMIN — ASPIRIN 325 MG ORAL TABLET 325 MG: 325 PILL ORAL at 08:41

## 2019-12-19 RX ADMIN — HEPARIN SODIUM 5000 UNITS: 5000 INJECTION INTRAVENOUS; SUBCUTANEOUS at 14:13

## 2019-12-19 RX ADMIN — WARFARIN SODIUM 5 MG: 5 TABLET ORAL at 17:51

## 2019-12-19 RX ADMIN — MUPIROCIN 1 APPLICATION: 20 OINTMENT TOPICAL at 06:00

## 2019-12-19 NOTE — TELEPHONE ENCOUNTER
[facility] Cardiac Surgery office called and scheduled TCM for pt, says he should be discharged later today   Scheduled Monday 12/30 with Dr Nadya Gamboa

## 2019-12-19 NOTE — RESPIRATORY THERAPY NOTE
RT Protocol Note  Júnior SEBASTIAN Core 70 y o  male MRN: [id number]  Unit/Bed#: [facility] 406-01 Encounter: [id number]    Assessment    Principal Problem:    Dissection of thoracic aorta ([address] )  Active Problems:    Benign essential hypertension    Abnormal TSH    Benign hypertension with CKD (chronic kidney disease) stage III (HCC)    Benign prostatic hyperplasia without lower urinary tract symptoms    S/P aorta repair      Home Pulmonary Medications:         Past Medical History:   Diagnosis Date    Arthritis     BPH without urinary obstruction     CKD (chronic kidney disease), stage III (HCC)     baseline 1 6- 7    GERD (gastroesophageal reflux disease)     Hyperlipidemia     Hypertension      Social History     Socioeconomic History    Marital status: /Civil Union     Spouse name: None    Number of children: None    Years of education: None    Highest education level: None   Occupational History    None   Social Needs    Financial resource strain: None    Food insecurity:     Worry: None     Inability: None    Transportation needs:     Medical: None     Non-medical: None   Tobacco Use    Smoking status: Former Smoker     Packs/day: 1 00     Last attempt to quit: 1980     Years since quittin 9    Smokeless tobacco: Never Used   Substance and Sexual Activity    Alcohol use: Not Currently    Drug use: Not Currently    Sexual activity: None   Lifestyle    Physical activity:     Days per week: None     Minutes per session: None    Stress: None   Relationships    Social connections:     Talks on phone: None     Gets together: None     Attends Hinduism service: None     Active member of club or organization: None     Attends meetings of clubs or organizations: None     Relationship status: None    Intimate partner violence:     Fear of current or ex partner: None     Emotionally abused: None     Physically abused: None     Forced sexual activity: None   Other Topics Concern    None   Social History Narrative    Daily caffeine consumption, 1 serving a day    Drinks coffee        Subjective    Subjective Data: pt currently in no resp distress  BS diminished/clear, SpO2 96% on room air  No resp UDN indicated at this time  Will follow pt post cardiac surgery    Objective    Physical Exam:   Assessment Type: (P) Assess only  General Appearance: (P) Alert  Respiratory Pattern: (P) Normal  Chest Assessment: (P) Chest expansion symmetrical  Bilateral Breath Sounds: (P) Clear  Cough: Dry, Non-productive  O2 Device: (P) nc    Vitals:  Blood pressure 137/81, pulse 70, temperature 97 9 °F (36 6 °C), temperature source Oral, resp  rate 18, height 5' 9" (1 753 m), weight 83 kg (182 lb 15 7 oz), SpO2 92 %  Results from last 7 days   Lab Units 12/15/19  2040   PH ART  7 320*   PCO2 ART mm Hg 33 1*   PO2 ART mm Hg 114 5   HCO3 ART mmol/L 16 7*   BASE EXC ART mmol/L -8 3   O2 CONTENT ART mL/dL 18 6   O2 HGB, ARTERIAL % 96 7   ABG SOURCE  Line, Arterial       Imaging and other studies: I have personally reviewed pertinent reports  O2 Device: (P) nc     Plan       Airway Clearance Plan: (P) Discontinue Protocol     Resp Comments: (P) Pt has been followed via ACP x2 days  Achieving IS goal  Plan to DC ACP

## 2019-12-19 NOTE — PROGRESS NOTES
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 with hemiarch reconstruction and aortic valve resuspension  POD #4  Went into Afib yesterday, started on IV amiodarone gtt  Tele reviewed- back in [facility] since yesterday evening  Volume management with Lasix gtt- 2 6 L UOP yesterday  ASA, statin, and metoprolol tartrate  2  Post-op paroxysmal atrial fibrillation  2 brief periods of afib with RVR POD #2-3  POD #3 in rate controlled Afib, started on IV amiodarone in addition to oral amio  Converted to NSR last night  Warfarin started for anticoagulation  3  HTN- stable, average /71      4  AFSHIN on CKD stage 3- creatinine 5  10      5  Hyperlipidemia- on statin    Subjective  A little incision pain when taking a deep breath, otherwise no complaints  Review of Systems   Constitution: Negative for diaphoresis and malaise/fatigue  Cardiovascular: Negative for chest pain, dyspnea on exertion, leg swelling, orthopnea and palpitations  Respiratory: Negative for cough and shortness of breath  Gastrointestinal: Negative for bloating and nausea  Neurological: Negative for dizziness and light-headedness  All other systems reviewed and are negative  Objective:   Vitals: Blood pressure 137/81, pulse 70, temperature 97 9 °F (36 6 °C), temperature source Oral, resp  rate 18, height 5' 9" (1 753 m), weight 83 kg (182 lb 15 7 oz), SpO2 97 %  ,     Body mass index is 27 02 kg/m²  ,     Systolic (06HAF), XOO:740 , Min:103 , CWU:169     Diastolic (84VBS), XTI:90, Min:66, Max:81      Intake/Output Summary (Last 24 hours) at 12/19/2019 0857  Last data filed at 12/19/2019 0820  Gross per 24 hour   Intake 480 ml   Output 2600 ml   Net -2120 ml     Weight (last 2 days)     Date/Time   Weight    12/18/19 0600   83 (182 98) 12/17/19 0544   86 8 (191 36)            Telemetry Review: No significant arrhythmias seen on telemetry review  Back in [facility] since 1937 yesterday  Physical Exam   Constitutional: He is oriented to person, place, and time  No distress  Neck: Neck supple  No JVD present  Cardiovascular: Normal rate, regular rhythm, normal heart sounds and intact distal pulses  Exam reveals no gallop and no friction rub  No murmur heard  Pulmonary/Chest: Effort normal  No respiratory distress  He has no rales  1LNC   Abdominal: Soft  He exhibits no distension  Musculoskeletal: Normal range of motion  He exhibits no edema  Neurological: He is alert and oriented to person, place, and time  Skin: Skin is warm and dry  He is not diaphoretic  Mid-sternal incision SCARLETT, healing well        LABORATORY RESULTS  Results from last 7 days   Lab Units 12/14/19  2322 12/14/19 2005   TROPONIN I ng/mL <0 02 0 03     CBC with diff: Results from last 7 days   Lab Units 12/19/19  0501 12/18/19  0646 12/17/19  0433 12/16/19  0356 12/16/19  0001 12/15/19  1619 12/15/19  1607  12/15/19  1354  12/15/19  0354 12/14/19 2005   WBC Thousand/uL 13 95* 17 15* 19 72* 21 18*  --   --   --   --   --   --  11 24* 9 07   HEMOGLOBIN g/dL 10 4* 10 5* 10 3* 11 3* 12 9  --  12 6  --   --   --  12 7 14 8   I STAT HEMOGLOBIN g/dl  --   --   --   --   --  11 9*  --    < >  --    < >  --   --    HEMATOCRIT % 31 5* 31 9* 31 5* 34 4* 39 6  --  38 5  --   --   --  38 0 44 3   HEMATOCRIT, ISTAT %  --   --   --   --   --  35*  --    < >  --    < >  --   --    MCV fL 92 93 93 93  --   --   --   --   --   --  91 92   PLATELETS Thousands/uL 111* 104* 86* 147*  --   --  169  --  77*  --  157 169   MCH pg 30 5 30 4 30 4 30 5  --   --   --   --   --   --  30 4 30 6   MCHC g/dL 33 0 32 9 32 7 32 8  --   --   --   --   --   --  33 4 33 4   RDW % 14 2 14 4 14 4 13 9  --   --   --   --   --   --  13 2 13 3   MPV fL 12 0 11 6 11 3 10 3  --   --  10 3  --  10 1  --  10 0 10  4   NRBC AUTO /100 WBCs  --   --  0  --   --   --   --   --   --   --   --  0    < > = values in this interval not displayed  CMP:  Results from last 7 days   Lab Units 12/19/19  0501 12/18/19  1806 12/18/19  0358 12/17/19  0813 12/17/19  0433 12/17/19  0020 12/16/19  1557  12/15/19  1619  12/15/19  1458 12/15/19  1412 12/15/19  1342 12/15/19  1312 12/15/19  1230 12/15/19  1207  12/14/19 2005   POTASSIUM mmol/L 3 2* 4 0 3 6 4 0 4 1 4 3 3 9   < >  --    < >  --   --   --   --   --   --    < > 4 5   CHLORIDE mmol/L 101 104 106 108 110* 112* 114*   < >  --    < >  --   --   --   --   --   --    < > 110*   CO2 mmol/L 28 27 24 23 24 23 20*   < >  --    < >  --   --   --   --   --   --    < > 26   CO2, I-STAT mmol/L  --   --   --   --   --   --   --   --  24  --  24 26 26 29 26 29   < >  --    BUN mg/dL 124* 126* 103* 79* 71* 65* 54*   < >  --    < >  --   --   --   --   --   --    < > 31*   CREATININE mg/dL 5 10* 5 13* 4 55* 4 09* 3 97* 3 95* 3 38*   < >  --    < >  --   --   --   --   --   --    < > 1 86*   GLUCOSE, ISTAT mg/dl  --   --   --   --   --   --   --   --  95  --  104 131 155* 164* 106 109   < >  --    CALCIUM mg/dL 8 0* 8 1* 8 1* 7 7* 7 9* 7 6* 7 8*   < >  --    < >  --   --   --   --   --   --    < > 9 3   AST U/L  --   --   --   --   --   --   --   --   --   --   --   --   --   --   --   --   --  39   ALT U/L  --   --   --   --   --   --   --   --   --   --   --   --   --   --   --   --   --  29   ALK PHOS U/L  --   --   --   --   --   --   --   --   --   --   --   --   --   --   --   --   --  54   EGFR ml/min/1 73sq m 10 10 12 13 14 14 17   < >  --    < >  --   --   --   --   --   --    < > 34    < > = values in this interval not displayed       BMP:  Results from last 7 days   Lab Units 12/19/19  0501 12/18/19  1806 12/18/19  0358 12/17/19  0813 12/17/19  0433 12/17/19  0020 12/16/19  1557  12/15/19  1619   POTASSIUM mmol/L 3 2* 4 0 3 6 4 0 4 1 4 3 3 9   < >  --    CHLORIDE mmol/L 101 104 106 108 110* 112* 114*   < >  --    CO2 mmol/L 28 27 24 23 24 23 20*   < >  --    CO2, I-STAT mmol/L  --   --   --   --   --   --   --   --  24   BUN mg/dL 124* 126* 103* 79* 71* 65* 54*   < >  --    CREATININE mg/dL 5 10* 5 13* 4 55* 4 09* 3 97* 3 95* 3 38*   < >  --    GLUCOSE, ISTAT mg/dl  --   --   --   --   --   --   --   --  95   CALCIUM mg/dL 8 0* 8 1* 8 1* 7 7* 7 9* 7 6* 7 8*   < >  --     < > = values in this interval not displayed  Lab Results   Component Value Date    NTBNP 4,974 (H) 2019      Results from last 7 days   Lab Units 19  0506 19  0433 19  0356 12/15/19  0354   MAGNESIUM mg/dL 2 8* 2 8* 2 8* 1 7     Results from last 7 days   Lab Units 19  0501 19  1806 19  0433 12/15/19  1607 12/15/19  0354   INR  1 40* 1 30* 1 48* 1 75* 1 27*     Lipid Profile:   No results found for: CHOL  Lab Results   Component Value Date    HDL 43 2018    HDL 32 (L) 2017    HDL 38 (L) 2016     Lab Results   Component Value Date    LDLCALC 115 (H) 2018    LDLCALC 116 (H) 2017    LDLCALC 131 (H) 2016     Lab Results   Component Value Date    TRIG 82 2018    TRIG 142 2017    TRIG 124 2016     Cardiac testing:   Results for orders placed during the hospital encounter of 19   Echo complete with contrast if indicated    Narrative Bren[address]  [address]  [phone]    Transthoracic Echocardiogram  2D, M-mode, Doppler, and Color Doppler    Study date:  2019    Patient: Joseluis Duval  MR number: [id number]  Account number: [de-identified]  : 1940  Age: 70 years  Gender: Male  Status: Outpatient  Location: Bedside  Height: 68 in  Weight: 191 6 lb  BP: 139/ 74 mmHg    Indications: Bradycardia, hypertension, heart failure      Diagnoses: R00 1 - Bradycardia, unspecified    Sonographer:  LAUREEN Elizabeth  Interpreting Physician:  Yan Aden MD  Primary Physician:  Lara Carr  Referring Physician:  Beata Rose:  Steph Fu [facility]  Cardiology Fellow: Valente Pearson MD    SUMMARY    PROCEDURE INFORMATION:  This was a technically difficult study  LEFT VENTRICLE:  Systolic function was normal  Ejection fraction was estimated to be 60 %  There were no regional wall motion abnormalities  Wall thickness was mildly increased  The changes were consistent with concentric remodeling (increased wall thickness with normal wall mass)  Doppler parameters were consistent with abnormal left ventricular relaxation (grade 1 diastolic dysfunction)  MITRAL VALVE:  There was mild annular calcification  TRICUSPID VALVE:  Transtricuspid velocity was increased due to increased transvalvular flow  There was mild regurgitation  COMPARISONS:  There has been no significant interval change  Comparison was made with the previous study of 08-Mar-2017  HISTORY: PRIOR HISTORY: HTN, CKD, HLD  PROCEDURE: The procedure was performed at the bedside  This was a routine study  The transthoracic approach was used  The study included complete 2D imaging, M-mode, complete spectral Doppler, and color Doppler  The heart rate was 60 bpm,  at the start of the study  Images were obtained from the parasternal, apical, subcostal, and suprasternal notch acoustic windows  Echocardiographic views were limited due to chest wall deformity  This was a technically difficult study  LEFT VENTRICLE: Size was normal  Systolic function was normal  Ejection fraction was estimated to be 60 %  There were no regional wall motion abnormalities  Wall thickness was mildly increased  The changes were consistent with concentric  remodeling (increased wall thickness with normal wall mass)  DOPPLER: Doppler parameters were consistent with abnormal left ventricular relaxation (grade 1 diastolic dysfunction)      RIGHT VENTRICLE: The size was normal  Systolic function was normal     LEFT ATRIUM: Size was normal     RIGHT ATRIUM: Size was normal     MITRAL VALVE: There was mild annular calcification  There was normal leaflet separation  DOPPLER: The transmitral velocity was within the normal range  There was no evidence for stenosis  There was no regurgitation  AORTIC VALVE: The valve was trileaflet  Leaflets exhibited normal thickness, mild to moderate calcification, normal cuspal separation, and sclerosis  DOPPLER: Transaortic velocity was within the normal range  There was no evidence for  stenosis  There was no regurgitation  TRICUSPID VALVE: There was normal leaflet separation  DOPPLER: Transtricuspid velocity was increased due to increased transvalvular flow  There was no evidence for stenosis  There was mild regurgitation  Pulmonary artery systolic pressure  was within the normal range  Estimated peak PA pressure was 35 mmHg  PULMONIC VALVE: DOPPLER: The transpulmonic velocity was within the normal range  There was trace regurgitation  PERICARDIUM: There was no pericardial effusion  AORTA: The root exhibited normal size  SYSTEMIC VEINS: IVC: The inferior vena cava was not well visualized  HEPATIC VEINS: The hepatic veins were not well visualized  PULMONARY VEINS: Not well visualized  SYSTEM MEASUREMENT TABLES    2D  %FS: 35 38 %  Ao Diam: 3 24 cm  EDV(Teich): 83 73 ml  EF(Teich): 65 08 %  ESV(Teich): 29 24 ml  IVSd: 1 4 cm  LA Diam: 3 94 cm  LVEDV MOD A4C: 83 8 ml  LVEF MOD A4C: 57 %  LVESV MOD A4C: 36 04 ml  LVIDd: 4 31 cm  LVIDs: 2 79 cm  LVLd A4C: 8 31 cm  LVLs A4C: 6 37 cm  LVPWd: 1 17 cm  RVIDd: 3 96 cm  SV MOD A4C: 47 77 ml  SV(Teich): 54 49 ml    CW  AV Env  Ti: 327 57 ms  AV VTI: 43 76 cm  AV Vmax: 1 89 m/s  AV Vmean: 1 34 m/s  AV maxP 32 mmHg  AV meanP 89 mmHg  TR Vmax: 2 6 m/s  TR maxP mmHg    MM  TAPSE: 3 12 cm    PW  E': 0 06 m/s  E/E': 12 75  LVOT Env  Ti: 364 48 ms  LVOT VTI: 24 83 cm  LVOT Vmax: 1 m/s  LVOT Vmean: 0 68 m/s  LVOT maxPG: 3 96 mmHg  LVOT meanP 16 mmHg  MV A Chet: 0 74 m/s  MV Dec [place]: 2 56 m/s2  MV DecT: 297 05 ms  MV E Chet: 0 76 m/s  MV E/A Ratio: 1 03  MV PHT: 86 14 ms  MVA By PHT: 2 55 cm2    [facility] Accredited [facility]chocardi[facility]    Prepared and electronically signed by    Heron Devries MD  Signed 2019 13:47:28       Meds/Allergies   all current active meds have been reviewed and current meds:   Current Facility-Administered Medications   Medication Dose Route Frequency    acetaminophen (TYLENOL) tablet 650 mg  650 mg Oral Q4H PRN    amiodarone (CORDARONE) 900 mg in dextrose 5 % 500 mL infusion  0 5 mg/min Intravenous Continuous    amiodarone tablet 200 mg  200 mg Oral Q8H [address]    aspirin tablet 325 mg  325 mg Oral Daily    atorvastatin (LIPITOR) tablet 80 mg  80 mg Oral Daily With Dinner    bisacodyl (DULCOLAX) rectal suppository 10 mg  10 mg Rectal Daily PRN    docusate sodium (COLACE) capsule 100 mg  100 mg Oral BID    furosemide (LASIX) 500 mg infusion 50 mL  10 mg/hr Intravenous Continuous    heparin (porcine) subcutaneous injection 5,000 Units  5,000 Units Subcutaneous Q8H [address]    insulin lispro (HumaLOG) 100 units/mL subcutaneous injection 1-5 Units  1-5 Units Subcutaneous TID AC    insulin lispro (HumaLOG) 100 units/mL subcutaneous injection 1-5 Units  1-5 Units Subcutaneous HS    metoprolol tartrate (LOPRESSOR) tablet 25 mg  25 mg Oral Q12H [address]    mupirocin (BACTROBAN) 2 % nasal ointment 1 application  1 application Nasal C01A [address]    ondansetron (ZOFRAN) injection 4 mg  4 mg Intravenous Q6H PRN    oxyCODONE-acetaminophen (PERCOCET) 5-325 mg per tablet 1 tablet  1 tablet Oral Q4H PRN    oxyCODONE-acetaminophen (PERCOCET) 5-325 mg per tablet 2 tablet  2 tablet Oral Q6H PRN    pantoprazole (PROTONIX) EC tablet 40 mg  40 mg Oral Daily    polyethylene glycol (MIRALAX) packet 17 g  17 g Oral Daily    temazepam (RESTORIL) capsule 15 mg  15 mg Oral HS PRN     Medications Prior to Admission   Medication    amLODIPine (NORVASC) 10 mg tablet    FLUAD 0 5 ML GEORGIE    hydrALAZINE (APRESOLINE) 25 mg tablet    hydrALAZINE (APRESOLINE) 50 mg tablet    hydrochlorothiazide (HYDRODIURIL) 25 mg tablet    losartan (COZAAR) 100 MG tablet    losartan (COZAAR) 50 mg tablet    losartan-hydrochlorothiazide (HYZAAR) 100-25 MG per tablet    omeprazole (PriLOSEC) 20 mg delayed release capsule    SHINGRIX 50 MCG SUSR    tamsulosin (FLOMAX) 0 4 mg       amiodarone 0 5 mg/min Last Rate: 0 5 mg/min (12/18/19 2156)   furosemide 10 mg/hr Last Rate: 10 mg/hr (12/18/19 2156)     Counseling / Coordination of Care  Total floor / unit time spent today 20 minutes  Greater than 50% of total time was spent with the patient and / or family counseling and / or coordination of care  ** Please Note: Dragon 360 Dictation voice to text software may have been used in the creation of this document   **

## 2019-12-19 NOTE — PLAN OF CARE
Problem: Potential for Falls  Goal: Patient will remain free of falls  Description  INTERVENTIONS:  - Assess patient frequently for physical needs  -  Identify cognitive and physical deficits and behaviors that affect risk of falls    -  [place] fall precautions as indicated by assessment   - Educate patient/family on patient safety including physical limitations  - Instruct patient to call for assistance with activity based on assessment  - Modify environment to reduce risk of injury  - Consider OT/PT consult to assist with strengthening/mobility  Outcome: Progressing     Problem: PAIN - ADULT  Goal: Verbalizes/displays adequate comfort level or baseline comfort level  Description  Interventions:  - Encourage patient to monitor pain and request assistance  - Assess pain using appropriate pain scale  - Administer analgesics based on type and severity of pain and evaluate response  - Implement non-pharmacological measures as appropriate and evaluate response  - Consider cultural and social influences on pain and pain management  - Notify physician/advanced practitioner if interventions unsuccessful or patient reports new pain  Outcome: Progressing     Problem: INFECTION - ADULT  Goal: Absence or prevention of progression during hospitalization  Description  INTERVENTIONS:  - Assess and monitor for signs and symptoms of infection  - Monitor lab/diagnostic results  - Monitor all insertion sites, i e  indwelling lines, tubes, and drains  - Monitor endotracheal if appropriate and nasal secretions for changes in amount and color  - [place] appropriate cooling/warming therapies per order  - Administer medications as ordered  - Instruct and encourage patient and family to use good hand hygiene technique  - Identify and instruct in appropriate isolation precautions for identified infection/condition  Outcome: Progressing     Problem: SAFETY ADULT  Goal: Maintain or return to baseline ADL function  Description  INTERVENTIONS:  -  Assess patient's ability to carry out ADLs; assess patient's baseline for ADL function and identify physical deficits which impact ability to perform ADLs (bathing, care of mouth/teeth, toileting, grooming, dressing, etc )  - Assess/evaluate cause of self-care deficits   - Assess range of motion  - Assess patient's mobility; develop plan if impaired  - Assess patient's need for assistive devices and provide as appropriate  - Encourage maximum independence but intervene and supervise when necessary  - Involve family in performance of ADLs  - Assess for home care needs following discharge   - Consider OT consult to assist with ADL evaluation and planning for discharge  - Provide patient education as appropriate  Outcome: Progressing  Goal: Maintain or return mobility status to optimal level  Description  INTERVENTIONS:  - Assess patient's baseline mobility status (ambulation, transfers, stairs, etc )    - Identify cognitive and physical deficits and behaviors that affect mobility  - Identify mobility aids required to assist with transfers and/or ambulation (gait belt, sit-to-stand, lift, walker, cane, etc )  - [place] fall precautions as indicated by assessment  - Record patient progress and toleration of activity level on Mobility SBAR; progress patient to next Phase/Stage  - Instruct patient to call for assistance with activity based on assessment  - Consider rehabilitation consult to assist with strengthening/weightbearing, etc   Outcome: Progressing     Problem: DISCHARGE PLANNING  Goal: Discharge to home or other facility with appropriate resources  Description  INTERVENTIONS:  - Identify barriers to discharge w/patient and caregiver  - Arrange for needed discharge resources and transportation as appropriate  - Identify discharge learning needs (meds, wound care, etc )  - Arrange for interpretive services to assist at discharge as needed  - Refer to Case Management Department for coordinating discharge planning if the patient needs post-hospital services based on physician/advanced practitioner order or complex needs related to functional status, cognitive ability, or social support system  Outcome: Progressing     Problem: Knowledge Deficit  Goal: Patient/family/caregiver demonstrates understanding of disease process, treatment plan, medications, and discharge instructions  Description  Complete learning assessment and assess knowledge base    Interventions:  - Provide teaching at level of understanding  - Provide teaching via preferred learning methods  Outcome: Progressing     Problem: NEUROSENSORY - ADULT  Goal: Achieves stable or improved neurological status  Description  INTERVENTIONS  - Monitor and report changes in neurological status  - Monitor vital signs such as temperature, blood pressure, glucose, and any other labs ordered   - Initiate measures to prevent increased intracranial pressure  - Monitor for seizure activity and implement precautions if appropriate      Outcome: Progressing  Goal: Remains free of injury related to seizures activity  Description  INTERVENTIONS  - Maintain airway, patient safety  and administer oxygen as ordered  - Monitor patient for seizure activity, document and report duration and description of seizure to physician/advanced practitioner  - If seizure occurs,  ensure patient safety during seizure  - Reorient patient post seizure  - Seizure pads on all 4 side rails  - Instruct patient/family to notify RN of any seizure activity including if an aura is experienced  - Instruct patient/family to call for assistance with activity based on nursing assessment  - Administer anti-seizure medications if ordered    Outcome: Progressing  Goal: Achieves maximal functionality and self care  Description  INTERVENTIONS  - Monitor swallowing and airway patency with patient fatigue and changes in neurological status  - Encourage and assist patient to increase activity and self care     - Encourage visually impaired, hearing impaired and aphasic patients to use assistive/communication devices  Outcome: Progressing     Problem: CARDIOVASCULAR - ADULT  Goal: Maintains optimal cardiac output and hemodynamic stability  Description  INTERVENTIONS:  - Monitor I/O, vital signs and rhythm  - Monitor for S/S and trends of decreased cardiac output  - Administer and titrate ordered vasoactive medications to optimize hemodynamic stability  - Assess quality of pulses, skin color and temperature  - Assess for signs of decreased coronary artery perfusion  - Instruct patient to report change in severity of symptoms  Outcome: Progressing  Goal: Absence of cardiac dysrhythmias or at baseline rhythm  Description  INTERVENTIONS:  - Continuous cardiac monitoring, vital signs, obtain 12 lead EKG if ordered  - Administer antiarrhythmic and heart rate control medications as ordered  - Monitor electrolytes and administer replacement therapy as ordered  Outcome: Progressing     Problem: RESPIRATORY - ADULT  Goal: Achieves optimal ventilation and oxygenation  Description  INTERVENTIONS:  - Assess for changes in respiratory status  - Assess for changes in mentation and behavior  - Position to facilitate oxygenation and minimize respiratory effort  - Oxygen administered by appropriate delivery if ordered  - Initiate smoking cessation education as indicated  - Encourage broncho-pulmonary hygiene including cough, deep breathe, Incentive Spirometry  - Assess the need for suctioning and aspirate as needed  - Assess and instruct to report SOB or any respiratory difficulty  - Respiratory Therapy support as indicated  Outcome: Progressing     Problem: Prexisting or High Potential for Compromised Skin Integrity  Goal: Skin integrity is maintained or improved  Description  INTERVENTIONS:  - Identify patients at risk for skin breakdown  - Assess and monitor skin integrity  - Assess and monitor nutrition and hydration status  - Monitor labs   - Assess for incontinence   - Turn and reposition patient  - Assist with mobility/ambulation  - Relieve pressure over bony prominences  - Avoid friction and shearing  - Provide appropriate hygiene as needed including keeping skin clean and dry  - Evaluate need for skin moisturizer/barrier cream  - Collaborate with interdisciplinary team   - Patient/family teaching  - Consider wound care consult   Outcome: Progressing

## 2019-12-19 NOTE — PROGRESS NOTES
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, Ascending aortic aneurysm     S/P Replacement of ascending aorta with aggressive hemiarch reconstruction and aortic valve resuspension with a 26 mm Dacron graft; POD # 4      24 Hour Events: Afib yesterday  Given amio bolus and drip  Converted at 7:30 pm and has remained in NSR  Lasix drip on at 10mg/hr, UOP 2 6L/24hrs   Cr continues to rise, 5 1 (4 55)    Medications:   Scheduled Meds:    Current Facility-Administered Medications:  acetaminophen 650 mg Oral Q4H PRN Netta Funk PA-C    amiodarone 0 5 mg/min Intravenous Continuous Lisabritt Pastrana PA-C Last Rate: 0 5 mg/min (12/18/19 2156)   amiodarone 200 mg Oral [facility] Alex Linder PA-C    aspirin 325 mg Oral Daily Alex Linder PA-C    atorvastatin 80 mg Oral Daily With [employer] NEETU Linder PA-C    bisacodyl 10 mg Rectal Daily PRN Brenda Linder PA-C    docusate sodium 100 mg Oral BID Alex Linder PA-C    furosemide 10 mg/hr Intravenous Continuous Lisa Pastrana PA-C Last Rate: 10 mg/hr (12/18/19 2156)   heparin (porcine) 5,000 Units Subcutaneous [facility] Alex Linder PA-C    insulin lispro 1-5 Units Subcutaneous TID [facility] Alex Linder PA-C    insulin lispro 1-5 Units Subcutaneous HS Alex Linder PA-C    metoprolol tartrate 25 mg Oral Q12H [address] Smuaya Perry PA-C    mupirocin 1 application Nasal [address] Alex Linder PA-C    ondansetron 4 mg Intravenous Q6H PRN Netta Funk PA-C    oxyCODONE-acetaminophen 1 tablet Oral Q4H PRN Netta Funk PA-C    oxyCODONE-acetaminophen 2 tablet Oral Q6H PRN Brenda Linder PA-C    pantoprazole 40 mg Oral Daily Alex Linder PA-C    polyethylene glycol 17 g Oral Daily Brenda Linder PA-C    temazepam 15 mg Oral HS PRN Brenda Linder PA-C      Continuous Infusions:    amiodarone 0 5 mg/min Last Rate: 0 5 mg/min (12/18/19 2982) furosemide 10 mg/hr Last Rate: 10 mg/hr (12/18/19 2156)     PRN Meds:   acetaminophen    bisacodyl    ondansetron    oxyCODONE-acetaminophen    oxyCODONE-acetaminophen    temazepam    Vitals:   Vitals:    12/19/19 0000 12/19/19 0315 12/19/19 0721 12/19/19 0800   BP:  108/74 137/81    BP Location:  Right arm Right arm    Pulse:  66 70    Resp:  18 18    Temp:  98 6 °F (37 °C) 97 9 °F (36 6 °C)    TempSrc:  Oral Oral    SpO2: 93% 94% 92% 97%   Weight:       Height:           Telemetry: NSR; Heart Rate: 72    Respiratory:   SpO2: SpO2: 97 %; 2 LPM    Intake/Output:     Intake/Output Summary (Last 24 hours) at 12/19/2019 0854  Last data filed at 12/19/2019 0820  Gross per 24 hour   Intake 480 ml   Output 2600 ml   Net -2120 ml   UOP 2 6L/24hrs        Weights:   Weight (last 2 days)     Date/Time   Weight    12/18/19 0600   83 (182 98)    12/17/19 0544   86 8 (191 36)            Admit weight: 79 4 kg    Results:   Results from last 7 days   Lab Units 12/19/19  0501 12/18/19  0646 12/17/19  0433   WBC Thousand/uL 13 95* 17 15* 19 72*   HEMOGLOBIN g/dL 10 4* 10 5* 10 3*   HEMATOCRIT % 31 5* 31 9* 31 5*   PLATELETS Thousands/uL 111* 104* 86*     Results from last 7 days   Lab Units 12/19/19  0501 12/18/19  1806 12/18/19  0358  12/15/19  1619   SODIUM mmol/L 141 142 142   < >  --    POTASSIUM mmol/L 3 2* 4 0 3 6   < >  --    CHLORIDE mmol/L 101 104 106   < >  --    CO2 mmol/L 28 27 24   < >  --    CO2, I-STAT mmol/L  --   --   --   --  24   BUN mg/dL 124* 126* 103*   < >  --    CREATININE mg/dL 5 10* 5 13* 4 55*   < >  --    GLUCOSE, ISTAT mg/dl  --   --   --   --  95   CALCIUM mg/dL 8 0* 8 1* 8 1*   < >  --     < > = values in this interval not displayed       Results from last 7 days   Lab Units 12/19/19  0501 12/18/19  1806 12/17/19  0433 12/15/19  1607   INR  1 40* 1 30* 1 48* 1 75*   PTT seconds  --   --   --  41*     Point of care glucose: 112-204    Studies:  No new studies    I have personally reviewed pertinent films in PACS    Invasive Lines/Tubes:  Invasive Devices     Central Venous Catheter Line            CVC Central Lines 12/15/19 Triple 3 days          Peripheral Intravenous Line            Peripheral IV 12/19/19 Left;Ventral (anterior) Forearm less than 1 day          Drain            Urethral Catheter Non-latex; Temperature probe 3 days                  Intake/Output Summary (Last 24 hours) at 12/19/2019 0912  Last data filed at 12/19/2019 0820  Gross per 24 hour   Intake 480 ml   Output 2600 ml   Net -2120 ml       Assessment:  Principal Problem:    Dissection of thoracic aorta (HCC)  Active Problems:    Benign essential hypertension    Abnormal TSH    Benign hypertension with CKD (chronic kidney disease) stage III (HCC)    Benign prostatic hyperplasia without lower urinary tract symptoms    S/P aorta repair       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, Ascending aortic aneurysm     S/P Replacement of ascending aorta with aggressive hemiarch reconstruction and aortic valve resuspension with a 26 mm Dacron graft; POD # 4    Plan:    1  Cardiac:   NSR; HR/BP well-controlled  Afib resolved - on amio infusion, continue PO amio  Lopressor, 25mg PO BID  Continue ASA and Statin therapy  Epicardial pacing wires out  Maintain central IV access today for medication access  Continue DVT prophylaxis, INR 1 4 after Coumadin 5 mg tonight  Will discuss need for anticoagulation with cardiology    2  Pulmonary:   Acute post-op pulmonary insufficiency; Requiring 2 Liters via nasal cannula, secondary to poor inspiratory effort secondary to pain  Continue incentive spirometry/coughing/deep breathing exercises  Wean supplemental oxygen as tolerated for saturation > 90%  Chest tubes have been discontinued    3   Renal:   Intake/Output net: -2 L/24 hours  Continue diuresis   Lasix 10 mg/hr, decrease today to Lasix 40 mg IV BID  Potassium Chloride 40 mEq PO BID  Hypokalemia, K 3 2 - replete today  Post op Creatinine elevated, Cr 5 1; Follow up labs prn  Consider nephrology consult    4  Neuro:  Neurologically intact; No active issues  Incisional pain well-controlled; Continue prn Percocet    5  GI:  Tolerating TLC 2 3 gm sodium diet  Maintain 1800 mL daily fluid restriction   Continue stool softeners and prn suppository  Continue GI prophylaxis    6  Endo:   No history of diabetes; Glucose well-controlled with sliding scale coverage    7    Hematology:    Post-operative acute blood loss anemia; Hemoglobin 10 4; trend prn    Leukocytosis resolving , WBC 13 9 (17)   Thrombocytopenia resolving , plts 111 (104)    8  Disposition:      Not yet ambulating independently, Anticipate discharge to home when medically ready     VTE Pharmacologic Prophylaxis: Heparin  VTE Mechanical Prophylaxis: sequential compression device    Collaborative rounds completed with NEETU Caldwell    Plan of care discussed with bedside nurse    SIGNATURE: Zahra Begum PA-C  DATE: December 19, 2019  TIME: 8:54 AM

## 2019-12-19 NOTE — CONSULTS
Consultation - Nephrology   Zena Mendiola [facility] 70 y o  male MRN: [id number]  Unit/Bed#: [facility] 406-01 Encounter: [id number]    ASSESSMENT/PLAN:   1  Acute kidney injury:  Suspect postoperative ATN +/- contrast nephropathy with CTA on admission   · Creatinine 1 8 on admission and then slowly worsened post op to 5 1 today   · Seems to have plateaued from last night and BUN also stable from last night at  124   · Patient has good urine output with no signs of uremia and electrolytes are stable so no current indication for dialysis  · Agree with stopping Lasix drip and would recommend holding diuretics for 24 hours to see if renal function improves as he does not appear grossly overloaded   · Check am BMP and follow urine output closely  2  CKD III: baseline creatinine around 1 4-1 8 and follows with Dr Su Blanco in our office   · Has atrophic R kidney   3  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/p repair  4  Hypokalemia: replaced by primary team   5  Anemia: hgb stable at 10 4 today   6  Hypertension: BP a little low today   · Will hold diuretics   · Continue metoprolol     HISTORY OF PRESENT ILLNESS:  Requesting Physician: Graciela Nickerson DO  Reason for Consult:  Acute kidney injury    Barbar Mom is a 70y o  year old male who was admitted to [facility] with chest pain on 12/14  The pain had started suddenly in the middle of his chest and radiated through to his back  CTA revealed 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  He was taken the OR on 12/15 for dissection repair  Postoperatively he has been on a Lasix drip with good urine output but unfortunately his renal function has been worsening and today his creatinine is 5 1 so Nephrology was consulted  Patient does have a history of CKD and follows with Dr Su Blanco in our office  Currently patient thinks he is feeling okay besides normal post op pain and denies any chest pain, shortness of breath, nausea, vomiting or diarrhea    He thinks he is eating and drinking normally  PAST MEDICAL HISTORY:  Past Medical History:   Diagnosis Date    Arthritis     BPH without urinary obstruction     CKD (chronic kidney disease), stage III (HCC)     baseline 1 6-1 7    GERD (gastroesophageal reflux disease)     Hyperlipidemia     Hypertension        PAST SURGICAL HISTORY:  Past Surgical History:   Procedure Laterality Date    APPENDECTOMY      COLONOSCOPY  2017    FRACTURE SURGERY      MO ASCEND AORTA GRAFT W ROOT REPLACMENT  VALVE CONDUIT/CORON RECONSTRUCT N/A 12/15/2019    Procedure: BENTALL PROCEDURE (ASCENDING AORTIC REPAIR) with 26mm Gelweave Graft;  Surgeon: Edvin Linder DO;  Location: [facility];  Service: Cardiac Surgery    MO RECONSTR TOTAL SHOULDER IMPLANT Right 2017    Procedure: ARTHROPLASTY SHOULDER REVERSE with OPEN CYST EXCISION;  Surgeon: Jamie Medina MD;  Location: [facility];  Service: Orthopedics    SHOULDER SURGERY      WRIST SURGERY         ALLERGIES:  No Known Allergies    SOCIAL HISTORY:  Social History     Substance and Sexual Activity   Alcohol Use Not Currently     Social History     Substance and Sexual Activity   Drug Use Not Currently     Social History     Tobacco Use   Smoking Status Former Smoker    Packs/day: 1 00    Last attempt to quit: Trupti Petty Years since quittin 9   Smokeless Tobacco Never Used       FAMILY HISTORY:  Family History   Problem Relation Age of Onset    No Known Problems Mother     Hypertension Father     Arthritis Family        MEDICATIONS:  Scheduled Meds:  Current Facility-Administered Medications:  acetaminophen 650 mg Oral Q4H PRN Netta Funk PA-C    amiodarone 0 5 mg/min Intravenous Continuous Sumaya Perry PA-C Last Rate: 0 5 mg/min (19)   amiodarone 200 mg Oral [facility] Alex Linder PA-C    aspirin 325 mg Oral Daily Alex Linder PA-C    atorvastatin 80 mg Oral Daily With [employer] NEETU Linder PA-C    bisacodyl 10 mg Rectal Daily PRN Brenda Cruz TRINY Linder    docusate sodium 100 mg Oral BID Yulisa Linder PA-C    furosemide 40 mg Intravenous BID (diuretic) Radha Perry PA-C    heparin (porcine) 5,000 Units Subcutaneous [facility] Alex M TRINY Linder    insulin lispro 1-5 Units Subcutaneous TID [facility] Alex M TRINY Linder    insulin lispro 1-5 Units Subcutaneous HS Alex M TRINY Linder    metoprolol tartrate 25 mg Oral Q12H [address] Lidia Au PA-C    mupirocin 1 application Nasal [address] [facility] TRINY Linder    ondansetron 4 mg Intravenous Q6H PRN oJse Parish PA-C    oxyCODONE-acetaminophen 1 tablet Oral Q4H PRN Jose Parish PA-C    oxyCODONE-acetaminophen 2 tablet Oral Q6H PRN Yulisa Linder PA-C    pantoprazole 40 mg Oral Daily [facility] TRINY Linder    polyethylene glycol 17 g Oral Daily [facility] TRINY Linder    potassium chloride 40 mEq Oral BID Sumaya Perry PA-C    potassium chloride 40 mEq Intravenous Once Lidia Au PA-C Last Rate: 40 mEq (12/19/19 1020)   temazepam 15 mg Oral HS PRN Jose Parish PA-C    warfarin 5 mg Oral Once (warfarin) Lidia Au PA-C        PRN Meds:   acetaminophen    bisacodyl    ondansetron    oxyCODONE-acetaminophen    oxyCODONE-acetaminophen    temazepam    Continuous Infusions:  amiodarone 0 5 mg/min Last Rate: 0 5 mg/min (12/18/19 2156)       REVIEW OF SYSTEMS:  A complete review of systems was done  Pertinent positives and negatives noted in the HPI but otherwise the review of systems is negative      PHYSICAL EXAM:  Current Weight: Weight - Scale: 83 kg (182 lb 15 7 oz)  First Weight: Weight - Scale: 79 4 kg (175 lb)  Vitals:    12/19/19 1104   BP: 94/51   Pulse: 59   Resp: 18   Temp: 98 4 °F (36 9 °C)   SpO2: 94%       Intake/Output Summary (Last 24 hours) at 12/19/2019 1210  Last data filed at 12/19/2019 1145  Gross per 24 hour   Intake 960 ml   Output 4100 ml   Net -3140 ml     General:  No acute distress  Skin:  No rash  Eyes:  Sclerae anicteric  ENT:  Moist mucous membranes  Neck:  Trachea midline, symmetric   Chest:  Clear to auscultation bilaterally  CVS:  Regular rate and rhythm  Abdomen:  Soft, nontender, nondistended  Extremities:  No edema  Neuro:  Awake and alert  Psych:  Appropriate affect      Lab Results:   Results from last 7 days   Lab Units 12/19/19  0501 12/18/19  1806 12/18/19  0646 12/18/19  0506 12/18/19  0358 12/17/19  0813 12/17/19  0433 12/17/19  0020 12/16/19  1557 12/16/19  0356 12/16/19  0001  12/15/19  1619 12/15/19  1607 12/15/19  1458 12/15/19  1412 12/15/19  1354 12/15/19  1342 12/15/19  1312 12/15/19  1230 12/15/19  1207  12/15/19  0354 12/14/19 2005   WBC Thousand/uL 13 95*  --  17 15*  --   --   --  19 72*  --   --  21 18*  --   --   --   --   --   --   --   --   --   --   --   --  11 24* 9 07   HEMOGLOBIN g/dL 10 4*  --  10 5*  --   --   --  10 3*  --   --  11 3* 12 9  --   --  12 6  --   --   --   --   --   --   --   --  12 7 14 8   I STAT HEMOGLOBIN g/dl  --   --   --   --   --   --   --   --   --   --   --   --  11 9*  --  10 5* 10 2*  --  9 9* 8 8* 9 2* 9 9*   < >  --   --    HEMATOCRIT % 31 5*  --  31 9*  --   --   --  31 5*  --   --  34 4* 39 6  --   --  38 5  --   --   --   --   --   --   --   --  38 0 44 3   HEMATOCRIT, ISTAT %  --   --   --   --   --   --   --   --   --   --   --   --  35*  --  31* 30*  --  29* 26* 27* 29*   < >  --   --    PLATELETS Thousands/uL 111*  --  104*  --   --   --  86*  --   --  147*  --   --   --  169  --   --  77*  --   --   --   --   --  157 169   SODIUM mmol/L 141 142  --   --  142 142 144 145 146* 147*  --   --   --  149*  --   --   --   --   --   --   --   --  144 141   POTASSIUM mmol/L 3 2* 4 0  --   --  3 6 4 0 4 1 4 3 3 9 4 1 4 3   < >  --  4 0  --   --   --   --   --   --   --   --  3 8 4 5   CHLORIDE mmol/L 101 104  --   --  106 108 110* 112* 114* 115*  --   --   --  115*  --   --   --   --   --   --   --   --  116* 110*   CO2 mmol/L 28 27  --   --  24 23 24 23 20* 23  --   --   --  24  --   --   --   --   --   --   -- --  24 26   CO2, I-STAT mmol/L  --   --   --   --   --   --   --   --   --   --   --   --  24  --  24 26  --  26 29 26 29   < >  --   --    BUN mg/dL 124* 126*  --   --  103* 79* 71* 65* 54* 44*  --   --   --  33*  --   --   --   --   --   --   --   --  32* 31*   CREATININE mg/dL 5 10* 5 13*  --   --  4 55* 4 09* 3 97* 3 95* 3 38* 2 97*  --   --   --  2 10*  --   --   --   --   --   --   --   --  1 70* 1 86*   CALCIUM mg/dL 8 0* 8 1*  --   --  8 1* 7 7* 7 9* 7 6* 7 8* 8 3  --   --   --  8 3  --   --   --   --   --   --   --   --  8 4 9 3   MAGNESIUM mg/dL  --   --   --  2 8*  --   --  2 8*  --   --  2 8*  --   --   --   --   --   --   --   --   --   --   --   --  1 7  --    GLUCOSE, ISTAT mg/dl  --   --   --   --   --   --   --   --   --   --   --   --  95  --  104 131  --  155* 164* 106 109   < >  --   --     < > = values in this interval not displayed  Radiology Results:   XR chest portable   Final Result by Jeffrey Parker MD (32/20 3617)      Extubation  Mild bibasilar atelectasis  Expected appearance after cardiac surgery  Workstation performed: [id number]         XR chest portable ICU   Final Result by Scot Delatorre MD (78/06 9044)      1  Status post sternotomy  Lines and support devices appear in position as noted above  Central fullness of the pulmonary vasculature may represent mild pulmonary vascular congestion  Workstation performed: [id number]         CTA dissection protocol chest abdomen pelvis w wo contrast   Final Result by Monae Reed MD (12/14 1970)      1  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 tapering to 4 1 cm at the aortic arch  2   There is crescentic intermediate density along the aortic wall extending from the aortic root through the aortic arch and along the proximal descending thoracic aorta compatible with acute type A intramural hematoma  Emergent vascular consultation    is recommended         I personally discussed this study with Tracee Hunt on 12/14/2019 at 10:57 PM                   Workstation performed: [id number]         XR chest 2 views   Final Result by Daniel Barajas MD (12/15 1048)      Cardiomegaly   No acute consolidation or congestion            Workstation performed: [id number]         XR chest pa & lateral    (Results Pending)

## 2019-12-20 PROBLEM — Z79.01 ANTICOAGULATION GOAL OF INR 2 TO 3: Status: ACTIVE | Noted: 2019-12-20

## 2019-12-20 PROBLEM — Z51.81 ANTICOAGULATION GOAL OF INR 2 TO 3: Status: ACTIVE | Noted: 2019-12-20

## 2019-12-20 LAB
ANION GAP SERPL CALCULATED.3IONS-SCNC: 10 MMOL/L (ref 4–13)
ATRIAL RATE: 71 BPM
BUN SERPL-MCNC: 127 MG/DL (ref 5–25)
CALCIUM SERPL-MCNC: 7.6 MG/DL (ref 8.3–10.1)
CHLORIDE SERPL-SCNC: 101 MMOL/L (ref 100–108)
CO2 SERPL-SCNC: 28 MMOL/L (ref 21–32)
CREAT SERPL-MCNC: 5.14 MG/DL (ref 0.6–1.3)
ERYTHROCYTE [DISTWIDTH] IN BLOOD BY AUTOMATED COUNT: 13.7 % (ref 11.6–15.1)
GFR SERPL CREATININE-BSD FRML MDRD: 10 ML/MIN/1.73SQ M
GLUCOSE SERPL-MCNC: 114 MG/DL (ref 65–140)
GLUCOSE SERPL-MCNC: 139 MG/DL (ref 65–140)
GLUCOSE SERPL-MCNC: 150 MG/DL (ref 65–140)
GLUCOSE SERPL-MCNC: 157 MG/DL (ref 65–140)
GLUCOSE SERPL-MCNC: 159 MG/DL (ref 65–140)
HCT VFR BLD AUTO: 29.8 % (ref 36.5–49.3)
HGB BLD-MCNC: 9.8 G/DL (ref 12–17)
INR PPP: 2.72 (ref 0.84–1.19)
MCH RBC QN AUTO: 30.2 PG (ref 26.8–34.3)
MCHC RBC AUTO-ENTMCNC: 32.9 G/DL (ref 31.4–37.4)
MCV RBC AUTO: 92 FL (ref 82–98)
P AXIS: 57 DEGREES
PLATELET # BLD AUTO: 103 THOUSANDS/UL (ref 149–390)
PMV BLD AUTO: 11.2 FL (ref 8.9–12.7)
POTASSIUM SERPL-SCNC: 3 MMOL/L (ref 3.5–5.3)
POTASSIUM SERPL-SCNC: 4.1 MMOL/L (ref 3.5–5.3)
PR INTERVAL: 222 MS
PROTHROMBIN TIME: 28.3 SECONDS (ref 11.6–14.5)
QRS AXIS: 43 DEGREES
QRSD INTERVAL: 98 MS
QT INTERVAL: 464 MS
QTC INTERVAL: 504 MS
RBC # BLD AUTO: 3.25 MILLION/UL (ref 3.88–5.62)
SODIUM SERPL-SCNC: 139 MMOL/L (ref 136–145)
T WAVE AXIS: 30 DEGREES
VENTRICULAR RATE: 71 BPM
WBC # BLD AUTO: 12.22 THOUSAND/UL (ref 4.31–10.16)

## 2019-12-20 PROCEDURE — 93005 ELECTROCARDIOGRAM TRACING: CPT

## 2019-12-20 PROCEDURE — 85027 COMPLETE CBC AUTOMATED: CPT | Performed by: THORACIC SURGERY (CARDIOTHORACIC VASCULAR SURGERY)

## 2019-12-20 PROCEDURE — 80048 BASIC METABOLIC PNL TOTAL CA: CPT | Performed by: THORACIC SURGERY (CARDIOTHORACIC VASCULAR SURGERY)

## 2019-12-20 PROCEDURE — 97116 GAIT TRAINING THERAPY: CPT

## 2019-12-20 PROCEDURE — 99232 SBSQ HOSP IP/OBS MODERATE 35: CPT | Performed by: INTERNAL MEDICINE

## 2019-12-20 PROCEDURE — 82948 REAGENT STRIP/BLOOD GLUCOSE: CPT

## 2019-12-20 PROCEDURE — 97530 THERAPEUTIC ACTIVITIES: CPT

## 2019-12-20 PROCEDURE — 85610 PROTHROMBIN TIME: CPT | Performed by: THORACIC SURGERY (CARDIOTHORACIC VASCULAR SURGERY)

## 2019-12-20 PROCEDURE — 97110 THERAPEUTIC EXERCISES: CPT

## 2019-12-20 PROCEDURE — 84132 ASSAY OF SERUM POTASSIUM: CPT | Performed by: PHYSICIAN ASSISTANT

## 2019-12-20 PROCEDURE — 93010 ELECTROCARDIOGRAM REPORT: CPT | Performed by: INTERNAL MEDICINE

## 2019-12-20 PROCEDURE — 99024 POSTOP FOLLOW-UP VISIT: CPT | Performed by: PHYSICIAN ASSISTANT

## 2019-12-20 RX ORDER — ALBUMIN, HUMAN INJ 5% 5 %
12.5 SOLUTION INTRAVENOUS ONCE
Status: COMPLETED | OUTPATIENT
Start: 2019-12-20 | End: 2019-12-20

## 2019-12-20 RX ORDER — POTASSIUM CHLORIDE 29.8 MG/ML
40 INJECTION INTRAVENOUS ONCE
Status: COMPLETED | OUTPATIENT
Start: 2019-12-20 | End: 2019-12-20

## 2019-12-20 RX ORDER — WARFARIN SODIUM 2.5 MG/1
2.5 TABLET ORAL
Status: DISCONTINUED | OUTPATIENT
Start: 2019-12-20 | End: 2019-12-20

## 2019-12-20 RX ORDER — WARFARIN SODIUM 2 MG/1
2 TABLET ORAL
Status: COMPLETED | OUTPATIENT
Start: 2019-12-20 | End: 2019-12-20

## 2019-12-20 RX ORDER — ASPIRIN 81 MG/1
81 TABLET, CHEWABLE ORAL DAILY
Status: DISCONTINUED | OUTPATIENT
Start: 2019-12-20 | End: 2020-01-07 | Stop reason: HOSPADM

## 2019-12-20 RX ADMIN — ATORVASTATIN CALCIUM 80 MG: 80 TABLET, FILM COATED ORAL at 15:40

## 2019-12-20 RX ADMIN — DOCUSATE SODIUM 100 MG: 100 CAPSULE, LIQUID FILLED ORAL at 18:01

## 2019-12-20 RX ADMIN — ALBUMIN (HUMAN) 12.5 G: 12.5 SOLUTION INTRAVENOUS at 00:31

## 2019-12-20 RX ADMIN — WARFARIN SODIUM 2 MG: 2 TABLET ORAL at 18:14

## 2019-12-20 RX ADMIN — INSULIN LISPRO 1 UNITS: 100 INJECTION, SOLUTION INTRAVENOUS; SUBCUTANEOUS at 12:30

## 2019-12-20 RX ADMIN — POTASSIUM CHLORIDE 40 MEQ: 29.8 INJECTION, SOLUTION INTRAVENOUS at 09:21

## 2019-12-20 RX ADMIN — HEPARIN SODIUM 5000 UNITS: 5000 INJECTION INTRAVENOUS; SUBCUTANEOUS at 13:06

## 2019-12-20 RX ADMIN — ALBUMIN (HUMAN) 12.5 G: 12.5 SOLUTION INTRAVENOUS at 10:01

## 2019-12-20 RX ADMIN — OXYCODONE HYDROCHLORIDE AND ACETAMINOPHEN 1 TABLET: 5; 325 TABLET ORAL at 19:52

## 2019-12-20 RX ADMIN — INSULIN LISPRO 1 UNITS: 100 INJECTION, SOLUTION INTRAVENOUS; SUBCUTANEOUS at 15:44

## 2019-12-20 RX ADMIN — MUPIROCIN 1 APPLICATION: 20 OINTMENT TOPICAL at 09:15

## 2019-12-20 RX ADMIN — METOPROLOL TARTRATE 12.5 MG: 25 TABLET ORAL at 21:16

## 2019-12-20 RX ADMIN — POLYETHYLENE GLYCOL 3350 17 G: 17 POWDER, FOR SOLUTION ORAL at 09:15

## 2019-12-20 RX ADMIN — OXYCODONE HYDROCHLORIDE AND ACETAMINOPHEN 2 TABLET: 5; 325 TABLET ORAL at 07:45

## 2019-12-20 RX ADMIN — AMIODARONE HYDROCHLORIDE 200 MG: 200 TABLET ORAL at 21:16

## 2019-12-20 RX ADMIN — PANTOPRAZOLE SODIUM 40 MG: 40 TABLET, DELAYED RELEASE ORAL at 09:15

## 2019-12-20 RX ADMIN — POTASSIUM CHLORIDE 40 MEQ: 1500 TABLET, EXTENDED RELEASE ORAL at 09:14

## 2019-12-20 RX ADMIN — HEPARIN SODIUM 5000 UNITS: 5000 INJECTION INTRAVENOUS; SUBCUTANEOUS at 08:33

## 2019-12-20 RX ADMIN — AMIODARONE HYDROCHLORIDE 200 MG: 200 TABLET ORAL at 15:40

## 2019-12-20 RX ADMIN — INSULIN LISPRO 1 UNITS: 100 INJECTION, SOLUTION INTRAVENOUS; SUBCUTANEOUS at 21:21

## 2019-12-20 RX ADMIN — DOCUSATE SODIUM 100 MG: 100 CAPSULE, LIQUID FILLED ORAL at 09:15

## 2019-12-20 RX ADMIN — ASPIRIN 81 MG 81 MG: 81 TABLET ORAL at 09:21

## 2019-12-20 RX ADMIN — HEPARIN SODIUM 5000 UNITS: 5000 INJECTION INTRAVENOUS; SUBCUTANEOUS at 21:14

## 2019-12-20 NOTE — PROGRESS NOTES
NEPHROLOGY PROGRESS NOTE   Philippe SEBASTIAN Core 70 y o  male MRN: [id number]  Unit/Bed#: [facility] 406-01 Encounter: [id number]      ASSESSMENT & PLAN:  1  Acute kidney injury on top of chronic kidney disease stage 3  Secondary to postoperative ATN plus minus contrast nephropathy  Creatinine on admission 1 8 and gradually worsened  Serum creatinine 5 14 today, hopefully plateauing  Good urine output with 3 2 L over the last 24 hours despite being off diuretics  Avoid hypotension, continue with albumin bolus p r n  Try to keep systolic blood pressure over 120  Consider midodrine 5 mg t i d  If no contraindication from cardiology standpoint of view  Monitor ins and outs  Keep holding diuretics for today  Okay to remove Hills catheter and do voiding trial   No urgent indication for dialysis at the moment  Follow labs  2  Chronic kidney disease stage 3 with baseline creatinine 1 4-1 8  3  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 on 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  4  Hemodynamics, blood pressure lower, keep holding diuretics, continue with albumin bolus p r n     Try to keep systolic blood pressure over 120 in the setting of acute renal failure  Consider midodrine if no contraindication  5  Anemia, monitor H&H and transfusion as needed  6  Hypokalemia, continue with replacement as needed, follow labs    My plan and recommendations were discussed with cardiac surgery team     SUBJECTIVE:  Patient seen and examined, denies any chest pain or shortness of breath, no abdominal pain, denies any nausea vomiting, noted hypotensive overnight receive 1 albumin bolus    Good urine output despite being off diuretics    OBJECTIVE:  Current Weight: Weight - Scale: 80 9 kg (178 lb 5 6 oz)  Vitals:    12/20/19 0906   BP: 97/62   Pulse:    Resp:    Temp:    SpO2:        Intake/Output Summary (Last 24 hours) at 12/20/2019 0933  Last data filed at 12/20/2019 0017  Gross per 24 hour   Intake 680 ml   Output 2225 ml   Net -1545 ml     General: conscious, cooperative, in not acute distress  Eyes: conjunctivae pale, anicteric sclerae  ENT: lips and mucous membranes moist, oxygen via nasal cannula  Neck: supple, no JVD  Chest: clear breath sounds bilateral, no crackles, ronchus or wheezings  CVS: distinct S1 & S2, normal rate, regular rhythm  Abdomen: soft, non-tender, non-distended, normoactive bowel sounds  Back: no CVA tenderness  Extremities: no significant edema of both legs  Skin: no rash  Neuro: awake, alert, oriented  Genitourinary [place] catheter in place      Medications:    Current Facility-Administered Medications:     acetaminophen (TYLENOL) tablet 650 mg, 650 mg, Oral, Q4H PRN, Esther Bumpers, PA-C    amiodarone tablet 200 mg, 200 mg, Oral, Q8H [address], Onel Linder PA-C, 200 mg at 12/19/19 2137    aspirin chewable tablet 81 mg, 81 mg, Oral, Daily, Sumaya Perry PA-C, 81 mg at 12/20/19 9131    atorvastatin (LIPITOR) tablet 80 mg, 80 mg, Oral, Daily With Florestine Tommy Linder PA-C, 80 mg at 12/19/19 1752    bisacodyl (DULCOLAX) rectal suppository 10 mg, 10 mg, Rectal, Daily PRN, Onel Linder PA-C    docusate sodium (COLACE) capsule 100 mg, 100 mg, Oral, BID, Alex Linder PA-C, 100 mg at 12/20/19 0915    heparin (porcine) subcutaneous injection 5,000 Units, 5,000 Units, Subcutaneous, Q8H [address], Esther Bumpers, PA-C, 5,000 Units at 12/20/19 7537    insulin lispro (HumaLOG) 100 units/mL subcutaneous injection 1-5 Units, 1-5 Units, Subcutaneous, TID AC, 1 Units at 12/18/19 1700 **AND** Fingerstick Glucose (POCT), , , TID AC, Alex Linder PA-C    insulin lispro (HumaLOG) 100 units/mL subcutaneous injection 1-5 Units, 1-5 Units, Subcutaneous, HS, Esther Bumpers, PA-C, 1 Units at 12/18/19 2143    metoprolol tartrate (LOPRESSOR) partial tablet 12 5 mg, 12 5 mg, Oral, Q12H FILIBERTO, Sumaya Perry PA-C    ondansetron (ZOFRAN) injection 4 mg, 4 mg, Intravenous, Q6H PRN, Esther Bumpers, PA-C    oxyCODONE-acetaminophen (PERCOCET) 5-325 mg per tablet 1 tablet, 1 tablet, Oral, Q4H PRN, Diana Linder PA-C    oxyCODONE-acetaminophen (PERCOCET) 5-325 mg per tablet 2 tablet, 2 tablet, Oral, Q6H PRN, Adonis Barry PA-C, 2 tablet at 12/20/19 0745    pantoprazole (PROTONIX) EC tablet 40 mg, 40 mg, Oral, Daily, Alex Linder PA-C, 40 mg at 12/20/19 0915    polyethylene glycol (MIRALAX) packet 17 g, 17 g, Oral, Daily, Diana Linder PA-C, 17 g at 12/20/19 0915    potassium chloride (K-DUR,KLOR-CON) CR tablet 40 mEq, 40 mEq, Oral, Daily, Sumaya Perry PA-C, 40 mEq at 12/20/19 0914    potassium chloride 40 mEq IVPB (premix), 40 mEq, Intravenous, Once, M D LYNNE Post PA-C, Last Rate: 25 mL/hr at 12/20/19 0921, 40 mEq at 12/20/19 0829    temazepam (RESTORIL) capsule 15 mg, 15 mg, Oral, HS PRN, Adonis Barry PA-C    warfarin (COUMADIN) tablet 2 mg, 2 mg, Oral, Once (warfarin), NEETU Post PA-C    Invasive Devices:   Urethral Catheter Non-latex; Temperature probe (Active)   Reasons to continue Urinary Catheter  Accurate I&O assessment in critically ill patients (48 hr  max) 12/20/2019 12:17 AM   Goal for Removal Remove after 48 hrs of I/O monitoring 12/20/2019  8:23 AM   Site Assessment Clean;Skin intact 12/20/2019 12:17 AM   Collection Container Standard drainage bag 12/20/2019 12:17 AM   Securement Method Securing device (Describe) 12/20/2019 12:17 AM   Output (mL) 575 mL 12/20/2019 12:17 AM       Lab Results:   Results from last 7 days   Lab Units 12/20/19  0429 12/19/19  0501 12/18/19  1806 12/18/19  0646 12/18/19  0506  12/17/19  0433  12/16/19  0356  12/15/19  1619  12/15/19  1458 12/15/19  1412  12/14/19  2005   WBC Thousand/uL 12 22* 13 95*  --  17 15*  --   --  19 72*  --  21 18*  --   --   --   --   --    < > 9 07   HEMOGLOBIN g/dL 9 8* 10 4*  --  10 5*  --   --  10 3*  --  11 3*   < >  --    < >  --   --    < > 14 8   I STAT HEMOGLOBIN g/dl  --   --   --   --   --   --   --   --   --   --  11 9*  --  10 5* 10 2*   < >  -- HEMATOCRIT % 29 8* 31 5*  --  31 9*  --   --  31 5*  --  34 4*   < >  --    < >  --   --    < > 44 3   HEMATOCRIT, ISTAT %  --   --   --   --   --   --   --   --   --   --  35*  --  31* 30*   < >  --    PLATELETS Thousands/uL 103* 111*  --  104*  --   --  86*  --  147*  --   --    < >  --   --    < > 169   SODIUM mmol/L 139 141 142  --   --    < > 144   < > 147*  --   --    < >  --   --    < > 141   POTASSIUM mmol/L 3 0* 3 2* 4 0  --   --    < > 4 1   < > 4 1   < >  --    < >  --   --    < > 4 5   CHLORIDE mmol/L 101 101 104  --   --    < > 110*   < > 115*  --   --    < >  --   --    < > 110*   CO2 mmol/L 28 28 27  --   --    < > 24   < > 23  --   --    < >  --   --    < > 26   CO2, I-STAT mmol/L  --   --   --   --   --   --   --   --   --   --  24  --  24 26   < >  --    BUN mg/dL 127* 124* 126*  --   --    < > 71*   < > 44*  --   --    < >  --   --    < > 31*   CREATININE mg/dL 5 14* 5 10* 5 13*  --   --    < > 3 97*   < > 2 97*  --   --    < >  --   --    < > 1 86*   CALCIUM mg/dL 7 6* 8 0* 8 1*  --   --    < > 7 9*   < > 8 3  --   --    < >  --   --    < > 9 3   MAGNESIUM mg/dL  --   --   --   --  2 8*  --  2 8*  --  2 8*  --   --   --   --   --    < >  --    ALK PHOS U/L  --   --   --   --   --   --   --   --   --   --   --   --   --   --   --  54   ALT U/L  --   --   --   --   --   --   --   --   --   --   --   --   --   --   --  29   AST U/L  --   --   --   --   --   --   --   --   --   --   --   --   --   --   --  39   GLUCOSE, ISTAT mg/dl  --   --   --   --   --   --   --   --   --   --  95  --  104 131   < >  --     < > = values in this interval not displayed  Portions of the record may have been created with voice recognition software  Occasional wrong word or "sound a like" substitutions may have occurred due to the inherent limitations of voice recognition software  Read the chart carefully and recognize, using context, where substitutions have occurred  If you have any questions, please contact the dictating provider

## 2019-12-20 NOTE — PHYSICAL THERAPY NOTE
Physical Therapy Progress Note     12/20/19 1005   Pain Assessment   Pain Assessment No/denies pain   Pain Score No Pain   Restrictions/Precautions   Other Precautions Cardiac/sternal;Telemetry;Multiple lines   Subjective   Subjective The pt  states that he is doing well, and he is agreeable to walk with therapy  Transfers   Sit to Stand 5  Supervision   Stand to Sit 5  Supervision   Ambulation/Elevation   Gait pattern Excessively slow; Short stride   Gait Assistance 5  Supervision   Assistive Device Rolling walker   Distance 500 feet  Balance   Static Sitting Good   Dynamic Sitting Good   Static Standing Fair   Ambulatory Fair -   Activity Tolerance   Activity Tolerance Patient tolerated treatment well   Nurse [id number], RN  Exercises   TKR Sitting;Bilateral;AROM;15 reps   Assessment   Prognosis Good   Problem List Decreased mobility; Impaired balance   Assessment The pt  has improving balance today, and he is able to ambulate beyond community distances  He had no losses of balance during gait even with dynamic head movements  He deferred a trial with no device today, but he was only lightly resting his hands on the walker  He did require occasional instruction for safe technique, but otherwise he required no assistance  Will continue to follow  Barriers to Discharge None   Goals   Patient Goals To go home  STG Expiration Date 12/26/19   PT Treatment Day 2   Plan   Treatment/Interventions Functional transfer training;LE strengthening/ROM; Therapeutic exercise; Endurance training;Patient/family training;Bed mobility;Gait training   Progress Progressing toward goals   PT Frequency   (4-6x a week )   Recommendation   Recommendation Home PT; Home with family support   Equipment Recommended Jessica Jarquinudent   PT - OK to Discharge Yes     Janine Baca PTA

## 2019-12-20 NOTE — RESTORATIVE TECHNICIAN NOTE
Restorative Specialist Mobility Note       Activity: Chair(for breakfast)     Assistive Device: None

## 2019-12-20 NOTE — PLAN OF CARE
Problem: PHYSICAL THERAPY ADULT  Goal: Performs mobility at highest level of function for planned discharge setting  See evaluation for individualized goals  Description  Treatment/Interventions: Functional transfer training, LE strengthening/ROM, Therapeutic exercise, Endurance training, Bed mobility, Gait training, Spoke to nursing, OT  Equipment Recommended: Walker(at this time)       See flowsheet documentation for full assessment, interventions and recommendations  Outcome: Adequate for Discharge  Note:   Prognosis: Good  Problem List: Decreased mobility, Impaired balance  Assessment: The pt  has improving balance today, and he is able to ambulate beyond community distances  He had no losses of balance during gait even with dynamic head movements  He deferred a trial with no device today, but he was only lightly resting his hands on the walker  He did require occasional instruction for safe technique, but otherwise he required no assistance  Will continue to follow  Barriers to Discharge: None     Recommendation: Home PT, Home with family support     PT - OK to Discharge: Yes    See flowsheet documentation for full assessment

## 2019-12-20 NOTE — PROGRESS NOTES
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 with hemiarch reconstruction and aortic valve resuspension  POD #5  Tele reviewed- back in rate controlled Afib  Good urine output- 3 2 L yesterday however creatinine remains elevated    Diuretics on hold per CTS  ASA, statin, and metoprolol tartrate      2  Post-op paroxysmal atrial fibrillation  Back in Afib- rates controlled  Amio held for slightly prolonged QT  Warfarin started for anticoagulation- INR 2 72      3  HTN- stable, average BP 97/61      4  AFSHIN on CKD stage 3- creatinine 5 14  Holding diuretics      5  Hyperlipidemia- on statin    Subjective  Review of Systems   Constitution: Negative for diaphoresis and malaise/fatigue  Cardiovascular: Negative for chest pain, dyspnea on exertion, leg swelling, orthopnea and palpitations  Respiratory: Negative for cough and shortness of breath  Gastrointestinal: Negative for bloating and nausea  Neurological: Negative for dizziness and light-headedness  All other systems reviewed and are negative  Objective:   Vitals: Blood pressure 97/62, pulse 92, temperature 98 5 °F (36 9 °C), temperature source Oral, resp  rate 16, height 5' 9" (1 753 m), weight 80 9 kg (178 lb 5 6 oz), SpO2 93 %  ,     Body mass index is 26 34 kg/m²  ,     Systolic (36HQX), KSM:40 , Min:84 , OIQ:944     Diastolic (30HFT), AGN:38, Min:51, Max:74    Intake/Output Summary (Last 24 hours) at 12/20/2019 0927  Last data filed at 12/20/2019 0017  Gross per 24 hour   Intake 680 ml   Output 2225 ml   Net -1545 ml     Weight (last 2 days)     Date/Time   Weight    12/20/19 0700   80 9 (178 35)    12/18/19 0600   83 (182 98)            Telemetry Review: No significant arrhythmias seen on telemetry review   Atrial fibrillation, rates 70s-80s  EKG personally reviewed by EL Florez  NSR with 1st degree AV block, prolonged QT  Physical Exam   Constitutional: He is oriented to person, place, and time  No distress  Neck: Neck supple  No JVD present  Cardiovascular: Normal rate, normal heart sounds and intact distal pulses  An irregularly irregular rhythm present  Exam reveals no gallop and no friction rub  No murmur heard  Pulmonary/Chest: Effort normal  No respiratory distress  He has no rales  Abdominal: Soft  Bowel sounds are normal    Musculoskeletal: Normal range of motion  He exhibits no edema  Neurological: He is alert and oriented to person, place, and time  Skin: Skin is warm and dry  He is not diaphoretic  Mid-sternal incision SCARLETT, no active bleeding/drainage       LABORATORY RESULTS  Results from last 7 days   Lab Units 12/14/19  2322 12/14/19 2005   TROPONIN I ng/mL <0 02 0 03     CBC with diff:   Results from last 7 days   Lab Units 12/20/19  0429 12/19/19  0501 12/18/19  0646 12/17/19  0433 12/16/19  0356 12/16/19  0001 12/15/19  1619 12/15/19  1607  12/15/19  1354  12/15/19  0354 12/14/19 2005   WBC Thousand/uL 12 22* 13 95* 17 15* 19 72* 21 18*  --   --   --   --   --   --  11 24* 9 07   HEMOGLOBIN g/dL 9 8* 10 4* 10 5* 10 3* 11 3* 12 9  --  12 6  --   --   --  12 7 14 8   I STAT HEMOGLOBIN g/dl  --   --   --   --   --   --  11 9*  --    < >  --    < >  --   --    HEMATOCRIT % 29 8* 31 5* 31 9* 31 5* 34 4* 39 6  --  38 5  --   --   --  38 0 44 3   HEMATOCRIT, ISTAT %  --   --   --   --   --   --  35*  --    < >  --    < >  --   --    MCV fL 92 92 93 93 93  --   --   --   --   --   --  91 92   PLATELETS Thousands/uL 103* 111* 104* 86* 147*  --   --  169  --  77*  --  157 169   MCH pg 30 2 30 5 30 4 30 4 30 5  --   --   --   --   --   --  30 4 30 6   MCHC g/dL 32 9 33 0 32 9 32 7 32 8  --   --   --   --   --   --  33 4 33 4   RDW % 13 7 14 2 14 4 14 4 13 9  --   --   --   --   --   --  13 2 13 3   MPV fL 11 2 12 0 11 6 11 3 10 3  --   --  10 3  --  10 1  --  10 0 10 4   NRBC AUTO /100 WBCs  --   --   --  0  --   --   --   --   --   --   --   --  0    < > = values in this interval not displayed  CMP:  Results from last 7 days   Lab Units 12/20/19  0429 12/19/19  0501 12/18/19  1806 12/18/19  0358 12/17/19  0813 12/17/19  0433 12/17/19  0020  12/15/19  1619  12/15/19  1458 12/15/19  1412 12/15/19  1342 12/15/19  1312 12/15/19  1230 12/15/19  1207  12/14/19 2005   POTASSIUM mmol/L 3 0* 3 2* 4 0 3 6 4 0 4 1 4 3   < >  --    < >  --   --   --   --   --   --    < > 4 5   CHLORIDE mmol/L 101 101 104 106 108 110* 112*   < >  --    < >  --   --   --   --   --   --    < > 110*   CO2 mmol/L 28 28 27 24 23 24 23   < >  --    < >  --   --   --   --   --   --    < > 26   CO2, I-STAT mmol/L  --   --   --   --   --   --   --   --  24  --  24 26 26 29 26 29   < >  --    BUN mg/dL 127* 124* 126* 103* 79* 71* 65*   < >  --    < >  --   --   --   --   --   --    < > 31*   CREATININE mg/dL 5 14* 5 10* 5 13* 4 55* 4 09* 3 97* 3 95*   < >  --    < >  --   --   --   --   --   --    < > 1 86*   GLUCOSE, ISTAT mg/dl  --   --   --   --   --   --   --   --  95  --  104 131 155* 164* 106 109   < >  --    CALCIUM mg/dL 7 6* 8 0* 8 1* 8 1* 7 7* 7 9* 7 6*   < >  --    < >  --   --   --   --   --   --    < > 9 3   AST U/L  --   --   --   --   --   --   --   --   --   --   --   --   --   --   --   --   --  39   ALT U/L  --   --   --   --   --   --   --   --   --   --   --   --   --   --   --   --   --  29   ALK PHOS U/L  --   --   --   --   --   --   --   --   --   --   --   --   --   --   --   --   --  54   EGFR ml/min/1 73sq m 10 10 10 12 13 14 14   < >  --    < >  --   --   --   --   --   --    < > 34    < > = values in this interval not displayed       BMP:  Results from last 7 days   Lab Units 12/20/19  0429 12/19/19  0501 12/18/19  1806 12/18/19  0358 12/17/19  0813 12/17/19  0433 12/17/19  0020  12/15/19  1619   POTASSIUM mmol/L 3 0* 3 2* 4 0 3 6 4 0 4 1 4 3   < >  --    CHLORIDE mmol/L 101 101 104 106 108 110* 112*   < >  --    CO2 mmol/L 28 28 27 24 23 24 23   < >  --    CO2, I-STAT mmol/L  --   --   --   --   --   --   --   --  24   BUN mg/dL 127* 124* 126* 103* 79* 71* 65*   < >  --    CREATININE mg/dL 5 14* 5 10* 5 13* 4 55* 4 09* 3 97* 3 95*   < >  --    GLUCOSE, ISTAT mg/dl  --   --   --   --   --   --   --   --  95   CALCIUM mg/dL 7 6* 8 0* 8 1* 8 1* 7 7* 7 9* 7 6*   < >  --     < > = values in this interval not displayed  Lab Results   Component Value Date    NTBNP 4,974 (H) 2019      Results from last 7 days   Lab Units 19  0506 19  0433 19  0356 12/15/19  0354   MAGNESIUM mg/dL 2 8* 2 8* 2 8* 1 7     Results from last 7 days   Lab Units 19  0429 19  0501 19  1806 19  0433 12/15/19  1607 12/15/19  0354   INR  2 72* 1 40* 1 30* 1 48* 1 75* 1 27*     Lipid Profile:   No results found for: CHOL  Lab Results   Component Value Date    HDL 43 2018    HDL 32 (L) 2017    HDL 38 (L) 2016     Lab Results   Component Value Date    LDLCALC 115 (H) 2018    LDLCALC 116 (H) 2017    LDLCALC 131 (H) 2016     Lab Results   Component Value Date    TRIG 82 2018    TRIG 142 2017    TRIG 124 2016     Cardiac testing:   Results for orders placed during the hospital encounter of 19   Echo complete with contrast if indicated    Sharyn Gutierrez 175  [facility], [address] [place] [address]  [phone]    Transthoracic Echocardiogram  2D, M-mode, Doppler, and Color Doppler    Study date:  2019    Patient: Alfonso Becerra  MR number: [id number]  Account number: [de-identified]  : 1940  Age: 70 years  Gender: Male  Status: Outpatient  Location: Bedside  Height: 68 in  Weight: 191 6 lb  BP: 139/ 74 mmHg    Indications: Bradycardia, hypertension, heart failure      Diagnoses: R00 1 - Bradycardia, unspecified    Sonographer:  LAUREEN Ceballos  Interpreting Physician:  Hector Henry MD  Primary Physician:  Graciela Ware  Referring Physician:  Desiree Rothman:  [facility]  Cardiology Fellow: Katrinka Sever, MD    SUMMARY    PROCEDURE INFORMATION:  This was a technically difficult study  LEFT VENTRICLE:  Systolic function was normal  Ejection fraction was estimated to be 60 %  There were no regional wall motion abnormalities  Wall thickness was mildly increased  The changes were consistent with concentric remodeling (increased wall thickness with normal wall mass)  Doppler parameters were consistent with abnormal left ventricular relaxation (grade 1 diastolic dysfunction)  MITRAL VALVE:  There was mild annular calcification  TRICUSPID VALVE:  Transtricuspid velocity was increased due to increased transvalvular flow  There was mild regurgitation  COMPARISONS:  There has been no significant interval change  Comparison was made with the previous study of 08-Mar-2017  HISTORY: PRIOR HISTORY: HTN, CKD, HLD  PROCEDURE: The procedure was performed at the bedside  This was a routine study  The transthoracic approach was used  The study included complete 2D imaging, M-mode, complete spectral Doppler, and color Doppler  The heart rate was 60 bpm,  at the start of the study  Images were obtained from the parasternal, apical, subcostal, and suprasternal notch acoustic windows  Echocardiographic views were limited due to chest wall deformity  This was a technically difficult study  LEFT VENTRICLE: Size was normal  Systolic function was normal  Ejection fraction was estimated to be 60 %  There were no regional wall motion abnormalities  Wall thickness was mildly increased  The changes were consistent with concentric  remodeling (increased wall thickness with normal wall mass)   DOPPLER: Doppler parameters were consistent with abnormal left ventricular relaxation (grade 1 diastolic dysfunction)  RIGHT VENTRICLE: The size was normal  Systolic function was normal     LEFT ATRIUM: Size was normal     RIGHT ATRIUM: Size was normal     MITRAL VALVE: There was mild annular calcification  There was normal leaflet separation  DOPPLER: The transmitral velocity was within the normal range  There was no evidence for stenosis  There was no regurgitation  AORTIC VALVE: The valve was trileaflet  Leaflets exhibited normal thickness, mild to moderate calcification, normal cuspal separation, and sclerosis  DOPPLER: Transaortic velocity was within the normal range  There was no evidence for  stenosis  There was no regurgitation  TRICUSPID VALVE: There was normal leaflet separation  DOPPLER: Transtricuspid velocity was increased due to increased transvalvular flow  There was no evidence for stenosis  There was mild regurgitation  Pulmonary artery systolic pressure  was within the normal range  Estimated peak PA pressure was 35 mmHg  PULMONIC VALVE: DOPPLER: The transpulmonic velocity was within the normal range  There was trace regurgitation  PERICARDIUM: There was no pericardial effusion  AORTA: The root exhibited normal size  SYSTEMIC VEINS: IVC: The inferior vena cava was not well visualized  HEPATIC VEINS: The hepatic veins were not well visualized  PULMONARY VEINS: Not well visualized  SYSTEM MEASUREMENT TABLES    2D  %FS: 35 38 %  Ao Diam: 3 24 cm  EDV(Teich): 83 73 ml  EF(Teich): 65 08 %  ESV(Teich): 29 24 ml  IVSd: 1 4 cm  LA Diam: 3 94 cm  LVEDV MOD A4C: 83 8 ml  LVEF MOD A4C: 57 %  LVESV MOD A4C: 36 04 ml  LVIDd: 4 31 cm  LVIDs: 2 79 cm  LVLd A4C: 8 31 cm  LVLs A4C: 6 37 cm  LVPWd: 1 17 cm  RVIDd: 3 96 cm  SV MOD A4C: 47 77 ml  SV(Teich): 54 49 ml    CW  AV Env  Ti: 327 57 ms  AV VTI: 43 76 cm  AV Vmax: 1 89 m/s  AV Vmean: 1 34 m/s  AV maxP 32 mmHg  AV meanP 89 mmHg  TR Vmax: 2 6 m/s  TR maxP mmHg    MM  TAPSE: 3 12 cm    PW  E': 0 06 m/s  E/E': 12 75  LVOT Env  Ti: 364 48 ms  LVOT VTI: 24 83 cm  LVOT Vmax: 1 m/s  LVOT Vmean: 0 68 m/s  LVOT maxPG: 3 96 mmHg  LVOT meanP 16 mmHg  MV A Chet: 0 74 m/s  MV Dec [place]: 2 56 m/s2  MV DecT: 297 05 ms  MV E Chet: 0 76 m/s  MV E/A Ratio: 1 03  MV PHT: 86 14 ms  MVA By PHT: 2 55 cm2    [facility] [place] [facility] Accredited Echocardiography Laboratory    Prepared and electronically signed by    Cooper Abreu MD  Signed 2019 13:47:28       Meds/Allergies   all current active meds have been reviewed and current meds:   Current Facility-Administered Medications   Medication Dose Route Frequency    acetaminophen (TYLENOL) tablet 650 mg  650 mg Oral Q4H PRN    amiodarone tablet 200 mg  200 mg Oral Q8H [address]    aspirin chewable tablet 81 mg  81 mg Oral Daily    atorvastatin (LIPITOR) tablet 80 mg  80 mg Oral Daily With Dinner    bisacodyl (DULCOLAX) rectal suppository 10 mg  10 mg Rectal Daily PRN    docusate sodium (COLACE) capsule 100 mg  100 mg Oral BID    heparin (porcine) subcutaneous injection 5,000 Units  5,000 Units Subcutaneous Q8H [address]    insulin lispro (HumaLOG) 100 units/mL subcutaneous injection 1-5 Units  1-5 Units Subcutaneous TID AC    insulin lispro (HumaLOG) 100 units/mL subcutaneous injection 1-5 Units  1-5 Units Subcutaneous HS    metoprolol tartrate (LOPRESSOR) partial tablet 12 5 mg  12 5 mg Oral Q12H [address]    ondansetron (ZOFRAN) injection 4 mg  4 mg Intravenous Q6H PRN    oxyCODONE-acetaminophen (PERCOCET) 5-325 mg per tablet 1 tablet  1 tablet Oral Q4H PRN    oxyCODONE-acetaminophen (PERCOCET) 5-325 mg per tablet 2 tablet  2 tablet Oral Q6H PRN    pantoprazole (PROTONIX) EC tablet 40 mg  40 mg Oral Daily    polyethylene glycol (MIRALAX) packet 17 g  17 g Oral Daily    potassium chloride (K-DUR,KLOR-CON) CR tablet 40 mEq  40 mEq Oral Daily    potassium chloride 40 mEq IVPB (premix)  40 mEq Intravenous Once    temazepam (RESTORIL) capsule 15 mg  15 mg Oral HS PRN    warfarin (COUMADIN) tablet 2 mg  2 mg Oral Once (warfarin)     Medications Prior to Admission   Medication    amLODIPine (NORVASC) 10 mg tablet    FLUAD 0 5 ML GEORGIE    hydrALAZINE (APRESOLINE) 25 mg tablet    hydrALAZINE (APRESOLINE) 50 mg tablet    hydrochlorothiazide (HYDRODIURIL) 25 mg tablet    losartan (COZAAR) 100 MG tablet    losartan (COZAAR) 50 mg tablet    losartan-hydrochlorothiazide (HYZAAR) 100-25 MG per tablet    omeprazole (PriLOSEC) 20 mg delayed release capsule    SHINGRIX 50 MCG SUSR    tamsulosin (FLOMAX) 0 4 mg     Counseling / Coordination of Care  Total floor / unit time spent today 20 minutes  Greater than 50% of total time was spent with the patient and / or family counseling and / or coordination of care  ** Please Note: Dragon 360 Dictation voice to text software may have been used in the creation of this document   **

## 2019-12-20 NOTE — PROGRESS NOTES
Patient had a blood pressure of 80/61 MAP 66 taken automatically  Patient blood pressure retaken manually and resulted as 80/60  Patient asymptomatic  Critical care PA-C Delta 116 aware  Albumin ordered and given  Patient BP reassessed and resulted as 102/65 MAP 78  Will continue to monitor

## 2019-12-20 NOTE — PROGRESS NOTES
Progress Note - Cardiothoracic Surgery   [facility] A Core 70 y o  male MRN: [id number]  Unit/Bed#: [facility] 406-01 Encounter: [id number]    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, Ascending aortic aneurysm     S/P Replacement of ascending aorta with aggressive hemiarch reconstruction and aortic valve resuspension with a 26 mm Dacron graft; POD # 5      24 Hour Events: Has remained NSR >24hrs  Amio drip discontinued yesterday afternoon  Prolonged QTc this morning on EKG  Continuing to anticoagulate  Holding diuretics for AFSHIN, high UOP  SBP 80s-90s  Asymptomatic      Medications:   Scheduled Meds:    Current Facility-Administered Medications:  acetaminophen 650 mg Oral Q4H PRN Anusha Ruiz PA-C   amiodarone 200 mg Oral [place], [place]   aspirin 325 mg Oral Daily Brandi Linder [place]   atorvastatin 80 mg Oral Daily With [facility]um[facility]renny Linder PA-C   bisacodyl 10 mg Rectal Daily PRN Brandi Linder PA-C   docusate sodium 100 mg Oral BID Brandi Linder PA-C   heparin (porcine) 5,000 Units Subcutaneous [facility] TRINY Linder   insulin lispro 1-5 Units Subcutaneous TID [facility] TRINY Linder   insulin lispro 1-5 Units Subcutaneous HS [facility] TRINY Linder   metoprolol tartrate 25 mg Oral Q12H [facility] Sumaya Perry PA-C   mupirocin 1 application Nasal B65M [facility] TRINY Linder   ondansetron 4 mg Intravenous Q6H PRN Anusha Ruiz PA-C   oxyCODONE-acetaminophen 1 tablet Oral Q4H PRN Anusha Ruiz PA-C   oxyCODONE-acetaminophen 2 tablet Oral Q6H PRN Brandi Linder PA-C   pantoprazole 40 mg Oral Daily Alex M TRINY Linder   polyethylene glycol 17 g Oral Daily Alex M TRINY Linder   potassium chloride 40 mEq Oral Daily Sumaya Perry PA-C   temazepam 15 mg Oral HS PRN Anusha Ruiz PA-C     Continuous Infusions:     PRN Meds:   acetaminophen    bisacodyl    ondansetron    oxyCODONE-acetaminophen    oxyCODONE-acetaminophen    temazepam    Vitals:   Vitals:    12/20/19 0042 12/20/19 0300 12/20/19 0700 12/20/19 0745   BP: 92/70 95/61  99/74   BP Location: Left arm Left arm     Pulse: 73 65  92   Resp: 18 18  16   Temp: 98 5 °F (36 9 °C) 98 2 °F (36 8 °C)  98 5 °F (36 9 °C)   TempSrc: Oral Oral  Oral   SpO2: 92% 93%     Weight:   80 9 kg (178 lb 5 6 oz)    Height:           Telemetry: NSR; Heart Rate: 75    Respiratory:   SpO2: SpO2: 93 %; 2 LPM     Intake/Output:     Intake/Output Summary (Last 24 hours) at 12/20/2019 0811  Last data filed at 12/20/2019 0017  Gross per 24 hour   Intake 860 ml   Output 3225 ml   Net -2365 ml   UOP 3 2L/24hrs        Weights:   Weight (last 2 days)     Date/Time   Weight    12/20/19 0700   80 9 (178 35)    12/18/19 0600   83 (182 98)            Admit weight: 79 4 kg    Results:   Results from last 7 days   Lab Units 12/20/19  0429 12/19/19  0501 12/18/19  0646   WBC Thousand/uL 12 22* 13 95* 17 15*   HEMOGLOBIN g/dL 9 8* 10 4* 10 5*   HEMATOCRIT % 29 8* 31 5* 31 9*   PLATELETS Thousands/uL 103* 111* 104*     Results from last 7 days   Lab Units 12/20/19 0429 12/19/19  0501 12/18/19  1806  12/15/19  1619   SODIUM mmol/L 139 141 142   < >  --    POTASSIUM mmol/L 3 0* 3 2* 4 0   < >  --    CHLORIDE mmol/L 101 101 104   < >  --    CO2 mmol/L 28 28 27   < >  --    CO2, I-STAT mmol/L  --   --   --   --  24   BUN mg/dL 127* 124* 126*   < >  --    CREATININE mg/dL 5 14* 5 10* 5 13*   < >  --    GLUCOSE, ISTAT mg/dl  --   --   --   --  95   CALCIUM mg/dL 7 6* 8 0* 8 1*   < >  --     < > = values in this interval not displayed  Results from last 7 days   Lab Units 12/20/19  0429 12/19/19  0501 12/18/19  1806  12/15/19  1607   INR  2 72* 1 40* 1 30*   < > 1 75*   PTT seconds  --   --   --   --  41*    < > = values in this interval not displayed  12/19 Coumadin 5 mg  12/18 Coumadin 5 mg      Point of care glucose: 114-149    Studies:  CXR 12/19:  IMPRESSION:  Persistent small left basilar pleural effusion with lungs otherwise clear      I have personally reviewed pertinent films in PACS    Invasive Lines/Tubes:  Invasive Devices     Central Venous Catheter Line            CVC Central Lines 12/15/19 Triple 4 days          Peripheral Intravenous Line            Peripheral IV 12/19/19 Left;Ventral (anterior) Forearm 1 day          Drain            Urethral Catheter Non-latex; Temperature probe 4 days                  Intake/Output Summary (Last 24 hours) at 12/20/2019 0811  Last data filed at 12/20/2019 0017  Gross per 24 hour   Intake 860 ml   Output 3225 ml   Net -2365 ml         Physical Exam:    HEENT/NECK:  PERRLA  No jugular venous distention  Cardiac: Regular rate and rhythm and No murmurs/rubs/gallops  Pulmonary:  Breath sounds diminished in the bases bilaterally   Abdomen:  Non-tender, Non-distended and Normal bowel sounds  Incisions: Sternum is stable  Incision is clean, dry, and intact  Extremities: Extremities warm/dry and Trace edema B/L  Neuro: Alert and oriented X 3, Sensation is grossly intact and No focal deficits  Skin: Warm/Dry, without rashes or lesions  Assessment:  Principal Problem:    Dissection of thoracic aorta (HCC)  Active Problems:    Benign essential hypertension    Abnormal TSH    Benign hypertension with CKD (chronic kidney disease) stage III (HCC)    Benign prostatic hyperplasia without lower urinary tract symptoms    S/P aorta repair    Hypokalemia    Leukocytosis    Thrombocytopenia (HCC)    Postoperative anemia due to acute blood loss       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, Ascending aortic aneurysm     S/P Replacement of ascending aorta with aggressive hemiarch reconstruction and aortic valve resuspension with a 26 mm Dacron graft; POD # 4    Plan:    1   Cardiac:   NSR; HR/BP well-controlled  Afib resolved - continue PO amiodarone  Decrease Lopressor to 12 5mg PO BID - hold parameters: SBP >110 mmHg  Consider albumin if SBP continues to drop  Continue ASA and Statin therapy  Epicardial pacing wires out  Maintain central IV access today for medication access  Continue DVT prophylaxis, INR 2 7, dose Coumadin 2 mg tonight      2  Pulmonary:   Acute post-op pulmonary insufficiency; Requiring 2 Liters via nasal cannula, secondary to poor inspiratory effort secondary to pain  Continue incentive spirometry/coughing/deep breathing exercises  Wean supplemental oxygen as tolerated for saturation > 90%  Chest tubes have been discontinued    3  Renal:   Intake/Output net: -2 37 L/24 hours  Hold diuresis today  Continue Potassium Chloride 40 mEq PO   Hypokalemia, K 3 - replete today  Post op Creatinine elevated, Cr 5 14; Follow up labs prn  Nephrology following  Keep shine catheter for accurate I/Os    4  Neuro:  Neurologically intact; No active issues  Incisional pain well-controlled; Continue prn Percocet    5  GI:  Tolerating TLC 2 3 gm sodium diet  Maintain 1800 mL daily fluid restriction   Continue stool softeners and prn suppository  Continue GI prophylaxis    6  Endo:   No history of diabetes; Glucose well-controlled with sliding scale coverage    7    Hematology:    Post-operative acute blood loss anemia; Hemoglobin 9 8; trend prn    Leukocytosis resolving , WBC 12 (13 9)   Thrombocytopenia resolving , plts 104 (111)    8  Disposition:      Not yet ambulating independently, Anticipate discharge to home when medically ready     VTE Pharmacologic Prophylaxis: Heparin  VTE Mechanical Prophylaxis: sequential compression device    Collaborative rounds completed with NEETU Marks    Plan of care discussed with bedside nurse    SIGNATURE: Harjeet Clark PA-C  DATE: December 20, 2019  TIME: 8:11 AM

## 2019-12-20 NOTE — PROGRESS NOTES
Patient complaining of shortness of breath and chest pain when patient breathes in  Patient SPO2 89% on 1 Liter nasal cannula  Patient placed on 3 liters nasal cannula  EKG obtained and given to Critical care TRINY Malik  Critical care TRINY Malik  Per Cherrie Malik retake EKG in the morning before morning dosage of amiodarone  Will continue to monitor

## 2019-12-21 PROBLEM — E87.6 HYPOKALEMIA: Status: RESOLVED | Noted: 2019-12-19 | Resolved: 2019-12-21

## 2019-12-21 LAB
ANION GAP SERPL CALCULATED.3IONS-SCNC: 8 MMOL/L (ref 4–13)
ATRIAL RATE: 69 BPM
ATRIAL RATE: 80 BPM
BUN SERPL-MCNC: 129 MG/DL (ref 5–25)
CALCIUM SERPL-MCNC: 8.1 MG/DL (ref 8.3–10.1)
CHLORIDE SERPL-SCNC: 101 MMOL/L (ref 100–108)
CO2 SERPL-SCNC: 28 MMOL/L (ref 21–32)
CREAT SERPL-MCNC: 4.38 MG/DL (ref 0.6–1.3)
ERYTHROCYTE [DISTWIDTH] IN BLOOD BY AUTOMATED COUNT: 13.8 % (ref 11.6–15.1)
GFR SERPL CREATININE-BSD FRML MDRD: 12 ML/MIN/1.73SQ M
GLUCOSE SERPL-MCNC: 100 MG/DL (ref 65–140)
GLUCOSE SERPL-MCNC: 108 MG/DL (ref 65–140)
GLUCOSE SERPL-MCNC: 119 MG/DL (ref 65–140)
GLUCOSE SERPL-MCNC: 120 MG/DL (ref 65–140)
GLUCOSE SERPL-MCNC: 138 MG/DL (ref 65–140)
HCT VFR BLD AUTO: 32.9 % (ref 36.5–49.3)
HGB BLD-MCNC: 10.9 G/DL (ref 12–17)
INR PPP: 3.54 (ref 0.84–1.19)
MCH RBC QN AUTO: 30.3 PG (ref 26.8–34.3)
MCHC RBC AUTO-ENTMCNC: 33.1 G/DL (ref 31.4–37.4)
MCV RBC AUTO: 91 FL (ref 82–98)
P AXIS: 53 DEGREES
PLATELET # BLD AUTO: 136 THOUSANDS/UL (ref 149–390)
PMV BLD AUTO: 11.6 FL (ref 8.9–12.7)
POTASSIUM SERPL-SCNC: 3.4 MMOL/L (ref 3.5–5.3)
PR INTERVAL: 208 MS
PROTHROMBIN TIME: 34.9 SECONDS (ref 11.6–14.5)
QRS AXIS: 28 DEGREES
QRS AXIS: 39 DEGREES
QRSD INTERVAL: 100 MS
QRSD INTERVAL: 96 MS
QT INTERVAL: 424 MS
QT INTERVAL: 470 MS
QTC INTERVAL: 498 MS
QTC INTERVAL: 503 MS
RBC # BLD AUTO: 3.6 MILLION/UL (ref 3.88–5.62)
SODIUM SERPL-SCNC: 137 MMOL/L (ref 136–145)
T WAVE AXIS: 28 DEGREES
T WAVE AXIS: 5 DEGREES
VENTRICULAR RATE: 69 BPM
VENTRICULAR RATE: 83 BPM
WBC # BLD AUTO: 14.44 THOUSAND/UL (ref 4.31–10.16)

## 2019-12-21 PROCEDURE — 99024 POSTOP FOLLOW-UP VISIT: CPT | Performed by: PHYSICIAN ASSISTANT

## 2019-12-21 PROCEDURE — 85027 COMPLETE CBC AUTOMATED: CPT | Performed by: PHYSICIAN ASSISTANT

## 2019-12-21 PROCEDURE — 93010 ELECTROCARDIOGRAM REPORT: CPT | Performed by: INTERNAL MEDICINE

## 2019-12-21 PROCEDURE — 80048 BASIC METABOLIC PNL TOTAL CA: CPT | Performed by: PHYSICIAN ASSISTANT

## 2019-12-21 PROCEDURE — 82948 REAGENT STRIP/BLOOD GLUCOSE: CPT

## 2019-12-21 PROCEDURE — 99232 SBSQ HOSP IP/OBS MODERATE 35: CPT | Performed by: INTERNAL MEDICINE

## 2019-12-21 PROCEDURE — 85610 PROTHROMBIN TIME: CPT | Performed by: PHYSICIAN ASSISTANT

## 2019-12-21 RX ORDER — MIDODRINE HYDROCHLORIDE 5 MG/1
2.5 TABLET ORAL
Status: DISCONTINUED | OUTPATIENT
Start: 2019-12-21 | End: 2019-12-22

## 2019-12-21 RX ORDER — POTASSIUM CHLORIDE 29.8 MG/ML
40 INJECTION INTRAVENOUS ONCE
Status: COMPLETED | OUTPATIENT
Start: 2019-12-21 | End: 2019-12-21

## 2019-12-21 RX ADMIN — OXYCODONE HYDROCHLORIDE AND ACETAMINOPHEN 2 TABLET: 5; 325 TABLET ORAL at 15:59

## 2019-12-21 RX ADMIN — POTASSIUM CHLORIDE 40 MEQ: 1500 TABLET, EXTENDED RELEASE ORAL at 09:05

## 2019-12-21 RX ADMIN — ATORVASTATIN CALCIUM 80 MG: 80 TABLET, FILM COATED ORAL at 15:59

## 2019-12-21 RX ADMIN — MIDODRINE HYDROCHLORIDE 2.5 MG: 5 TABLET ORAL at 11:32

## 2019-12-21 RX ADMIN — DOCUSATE SODIUM 100 MG: 100 CAPSULE, LIQUID FILLED ORAL at 17:48

## 2019-12-21 RX ADMIN — AMIODARONE HYDROCHLORIDE 200 MG: 200 TABLET ORAL at 05:11

## 2019-12-21 RX ADMIN — AMIODARONE HYDROCHLORIDE 200 MG: 200 TABLET ORAL at 21:14

## 2019-12-21 RX ADMIN — POTASSIUM CHLORIDE 40 MEQ: 29.8 INJECTION, SOLUTION INTRAVENOUS at 10:13

## 2019-12-21 RX ADMIN — MIDODRINE HYDROCHLORIDE 2.5 MG: 5 TABLET ORAL at 15:59

## 2019-12-21 RX ADMIN — OXYCODONE HYDROCHLORIDE AND ACETAMINOPHEN 1 TABLET: 5; 325 TABLET ORAL at 05:13

## 2019-12-21 RX ADMIN — POLYETHYLENE GLYCOL 3350 17 G: 17 POWDER, FOR SOLUTION ORAL at 09:05

## 2019-12-21 RX ADMIN — AMIODARONE HYDROCHLORIDE 200 MG: 200 TABLET ORAL at 13:35

## 2019-12-21 RX ADMIN — HEPARIN SODIUM 5000 UNITS: 5000 INJECTION INTRAVENOUS; SUBCUTANEOUS at 05:11

## 2019-12-21 RX ADMIN — ASPIRIN 81 MG 81 MG: 81 TABLET ORAL at 09:05

## 2019-12-21 RX ADMIN — DOCUSATE SODIUM 100 MG: 100 CAPSULE, LIQUID FILLED ORAL at 09:05

## 2019-12-21 RX ADMIN — PANTOPRAZOLE SODIUM 40 MG: 40 TABLET, DELAYED RELEASE ORAL at 09:05

## 2019-12-21 NOTE — PLAN OF CARE
Problem: Potential for Falls  Goal: Patient will remain free of falls  Description  INTERVENTIONS:  - Assess patient frequently for physical needs  -  Identify cognitive and physical deficits and behaviors that affect risk of falls    -  [place] fall precautions as indicated by assessment   - Educate patient/family on patient safety including physical limitations  - Instruct patient to call for assistance with activity based on assessment  - Modify environment to reduce risk of injury  - Consider OT/PT consult to assist with strengthening/mobility  Outcome: Progressing     Problem: PAIN - ADULT  Goal: Verbalizes/displays adequate comfort level or baseline comfort level  Description  Interventions:  - Encourage patient to monitor pain and request assistance  - Assess pain using appropriate pain scale  - Administer analgesics based on type and severity of pain and evaluate response  - Implement non-pharmacological measures as appropriate and evaluate response  - Consider cultural and social influences on pain and pain management  - Notify physician/advanced practitioner if interventions unsuccessful or patient reports new pain  Outcome: Progressing     Problem: INFECTION - ADULT  Goal: Absence or prevention of progression during hospitalization  Description  INTERVENTIONS:  - Assess and monitor for signs and symptoms of infection  - Monitor lab/diagnostic results  - Monitor all insertion sites, i e  indwelling lines, tubes, and drains  - Monitor endotracheal if appropriate and nasal secretions for changes in amount and color  - [place] appropriate cooling/warming therapies per order  - Administer medications as ordered  - Instruct and encourage patient and family to use good hand hygiene technique  - Identify and instruct in appropriate isolation precautions for identified infection/condition  Outcome: Progressing     Problem: SAFETY ADULT  Goal: Maintain or return to baseline ADL function  Description  INTERVENTIONS:  -  Assess patient's ability to carry out ADLs; assess patient's baseline for ADL function and identify physical deficits which impact ability to perform ADLs (bathing, care of mouth/teeth, toileting, grooming, dressing, etc )  - Assess/evaluate cause of self-care deficits   - Assess range of motion  - Assess patient's mobility; develop plan if impaired  - Assess patient's need for assistive devices and provide as appropriate  - Encourage maximum independence but intervene and supervise when necessary  - Involve family in performance of ADLs  - Assess for home care needs following discharge   - Consider OT consult to assist with ADL evaluation and planning for discharge  - Provide patient education as appropriate  Outcome: Progressing  Goal: Maintain or return mobility status to optimal level  Description  INTERVENTIONS:  - Assess patient's baseline mobility status (ambulation, transfers, stairs, etc )    - Identify cognitive and physical deficits and behaviors that affect mobility  - Identify mobility aids required to assist with transfers and/or ambulation (gait belt, sit-to-stand, lift, walker, cane, etc )  - [place] fall precautions as indicated by assessment  - Record patient progress and toleration of activity level on Mobility SBAR; progress patient to next Phase/Stage  - Instruct patient to call for assistance with activity based on assessment  - Consider rehabilitation consult to assist with strengthening/weightbearing, etc   Outcome: Progressing     Problem: DISCHARGE PLANNING  Goal: Discharge to home or other facility with appropriate resources  Description  INTERVENTIONS:  - Identify barriers to discharge w/patient and caregiver  - Arrange for needed discharge resources and transportation as appropriate  - Identify discharge learning needs (meds, wound care, etc )  - Arrange for interpretive services to assist at discharge as needed  - Refer to Case Management Department for coordinating discharge planning if the patient needs post-hospital services based on physician/advanced practitioner order or complex needs related to functional status, cognitive ability, or social support system  Outcome: Progressing     Problem: Knowledge Deficit  Goal: Patient/family/caregiver demonstrates understanding of disease process, treatment plan, medications, and discharge instructions  Description  Complete learning assessment and assess knowledge base    Interventions:  - Provide teaching at level of understanding  - Provide teaching via preferred learning methods  Outcome: Progressing     Problem: NEUROSENSORY - ADULT  Goal: Achieves stable or improved neurological status  Description  INTERVENTIONS  - Monitor and report changes in neurological status  - Monitor vital signs such as temperature, blood pressure, glucose, and any other labs ordered   - Initiate measures to prevent increased intracranial pressure  - Monitor for seizure activity and implement precautions if appropriate      Outcome: Progressing  Goal: Remains free of injury related to seizures activity  Description  INTERVENTIONS  - Maintain airway, patient safety  and administer oxygen as ordered  - Monitor patient for seizure activity, document and report duration and description of seizure to physician/advanced practitioner  - If seizure occurs,  ensure patient safety during seizure  - Reorient patient post seizure  - Seizure pads on all 4 side rails  - Instruct patient/family to notify RN of any seizure activity including if an aura is experienced  - Instruct patient/family to call for assistance with activity based on nursing assessment  - Administer anti-seizure medications if ordered    Outcome: Progressing  Goal: Achieves maximal functionality and self care  Description  INTERVENTIONS  - Monitor swallowing and airway patency with patient fatigue and changes in neurological status  - Encourage and assist patient to increase activity and self care     - Encourage visually impaired, hearing impaired and aphasic patients to use assistive/communication devices  Outcome: Progressing     Problem: CARDIOVASCULAR - ADULT  Goal: Maintains optimal cardiac output and hemodynamic stability  Description  INTERVENTIONS:  - Monitor I/O, vital signs and rhythm  - Monitor for S/S and trends of decreased cardiac output  - Administer and titrate ordered vasoactive medications to optimize hemodynamic stability  - Assess quality of pulses, skin color and temperature  - Assess for signs of decreased coronary artery perfusion  - Instruct patient to report change in severity of symptoms  Outcome: Progressing  Goal: Absence of cardiac dysrhythmias or at baseline rhythm  Description  INTERVENTIONS:  - Continuous cardiac monitoring, vital signs, obtain 12 lead EKG if ordered  - Administer antiarrhythmic and heart rate control medications as ordered  - Monitor electrolytes and administer replacement therapy as ordered  Outcome: Progressing     Problem: RESPIRATORY - ADULT  Goal: Achieves optimal ventilation and oxygenation  Description  INTERVENTIONS:  - Assess for changes in respiratory status  - Assess for changes in mentation and behavior  - Position to facilitate oxygenation and minimize respiratory effort  - Oxygen administered by appropriate delivery if ordered  - Initiate smoking cessation education as indicated  - Encourage broncho-pulmonary hygiene including cough, deep breathe, Incentive Spirometry  - Assess the need for suctioning and aspirate as needed  - Assess and instruct to report SOB or any respiratory difficulty  - Respiratory Therapy support as indicated  Outcome: Progressing     Problem: Prexisting or High Potential for Compromised Skin Integrity  Goal: Skin integrity is maintained or improved  Description  INTERVENTIONS:  - Identify patients at risk for skin breakdown  - Assess and monitor skin integrity  - Assess and monitor nutrition and hydration status  - Monitor labs   - Assess for incontinence   - Turn and reposition patient  - Assist with mobility/ambulation  - Relieve pressure over bony prominences  - Avoid friction and shearing  - Provide appropriate hygiene as needed including keeping skin clean and dry  - Evaluate need for skin moisturizer/barrier cream  - Collaborate with interdisciplinary team   - Patient/family teaching  - Consider wound care consult   Outcome: Progressing

## 2019-12-21 NOTE — PROGRESS NOTES
Cardiology Progress Note - Sergio Brito Core 70 y o  male MRN: [id number]    Unit/Bed#: [facility] 406-01 Encounter: [id number]        Subjective:    No significant events overnight  In sinus  No complaints  ROS    Objective:   Vitals: Blood pressure 128/60, pulse 76, temperature 98 2 °F (36 8 °C), temperature source Oral, resp  rate 18, height 5' 9" (1 753 m), weight 81 2 kg (179 lb 1 6 oz), SpO2 95 %  , Body mass index is 26 45 kg/m² ,   Orthostatic Blood Pressures      Most Recent Value   Blood Pressure  128/60 [Map 84] filed at 12/21/2019 1037   Patient Position - Orthostatic VS  Sitting filed at 12/21/2019 8908         Systolic (25NZQ), TIQ:556 , Min:94 , QNW:445     Diastolic (19FDN), QFX:76, Min:60, Max:78      Intake/Output Summary (Last 24 hours) at 12/21/2019 1116  Last data filed at 12/21/2019 0820  Gross per 24 hour   Intake 820 ml   Output 1795 ml   Net -975 ml     Weight (last 2 days)     Date/Time   Weight    12/21/19 0600   81 2 (179 1)    12/20/19 0700   80 9 (178 35)                Telemetry Review: No significant arrhythmias seen on telemetry review  NSR prolong QT      Physical Exam   Constitutional: He is oriented to person, place, and time  No distress  HENT:   Mouth/Throat: No oropharyngeal exudate  Eyes: No scleral icterus  Neck: No JVD present  Cardiovascular: Normal rate and regular rhythm  No murmur heard  Pulmonary/Chest: Effort normal and breath sounds normal  No stridor  No respiratory distress  He has no wheezes  Abdominal: Soft  Bowel sounds are normal  He exhibits no distension  There is no tenderness  There is no guarding  Musculoskeletal: He exhibits no edema  Neurological: He is alert and oriented to person, place, and time  Skin: Skin is warm and dry  He is not diaphoretic  Psychiatric: He has a normal mood and affect   His behavior is normal          Laboratory Results:  Results from last 7 days   Lab Units 12/14/19 2322 12/14/19 2005   TROPONIN I ng/mL <0 02 0 03 CBC with diff:   Results from last 7 days   Lab Units 12/21/19  0519 12/20/19  0429 12/19/19  0501 12/18/19  0646 12/17/19  0433 12/16/19  0356 12/16/19  0001  12/15/19  1607  12/15/19  0354 12/14/19 2005   WBC Thousand/uL 14 44* 12 22* 13 95* 17 15* 19 72* 21 18*  --   --   --   --  11 24* 9 07   HEMOGLOBIN g/dL 10 9* 9 8* 10 4* 10 5* 10 3* 11 3* 12 9  --  12 6  --  12 7 14 8   I STAT HEMOGLOBIN   --   --   --   --   --   --   --    < >  --    < >  --   --    HEMATOCRIT % 32 9* 29 8* 31 5* 31 9* 31 5* 34 4* 39 6  --  38 5  --  38 0 44 3   HEMATOCRIT, ISTAT   --   --   --   --   --   --   --    < >  --    < >  --   --    MCV fL 91 92 92 93 93 93  --   --   --   --  91 92   PLATELETS Thousands/uL 136* 103* 111* 104* 86* 147*  --   --  169   < > 157 169   MCH pg 30 3 30 2 30 5 30 4 30 4 30 5  --   --   --   --  30 4 30 6   MCHC g/dL 33 1 32 9 33 0 32 9 32 7 32 8  --   --   --   --  33 4 33 4   RDW % 13 8 13 7 14 2 14 4 14 4 13 9  --   --   --   --  13 2 13 3   MPV fL 11 6 11 2 12 0 11 6 11 3 10 3  --   --  10 3   < > 10 0 10 4   NRBC AUTO /100 WBCs  --   --   --   --  0  --   --   --   --   --   --  0    < > = values in this interval not displayed           CMP:  Results from last 7 days   Lab Units 12/21/19 0519 12/20/19  1504 12/20/19  0429 12/19/19  0501 12/18/19  1806 12/18/19  0358 12/17/19  0813 12/17/19  0433  12/15/19  1619  12/15/19  1458 12/15/19  1412 12/15/19  1342 12/15/19  1312 12/15/19  1230 12/15/19  1207  12/14/19 2005   POTASSIUM mmol/L 3 4* 4 1 3 0* 3 2* 4 0 3 6 4 0 4 1   < >  --    < >  --   --   --   --   --   --    < > 4 5   CHLORIDE mmol/L 101  --  101 101 104 106 108 110*   < >  --    < >  --   --   --   --   --   --    < > 110*   CO2 mmol/L 28  --  28 28 27 24 23 24   < >  --    < >  --   --   --   --   --   --    < > 26   CO2, I-STAT mmol/L  --   --   --   --   --   --   --   --   --  24  --  24 26 26 29 26 29   < >  --    BUN mg/dL 129*  --  127* 124* 126* 103* 79* 71*   < >  --    < >  --   --   --   --   --   --    < > 31*   CREATININE mg/dL 4 38*  --  5 14* 5 10* 5 13* 4 55* 4 09* 3 97*   < >  --    < >  --   --   --   --   --   --    < > 1 86*   GLUCOSE, ISTAT mg/dl  --   --   --   --   --   --   --   --   --  95  --  104 131 155* 164* 106 109   < >  --    CALCIUM mg/dL 8 1*  --  7 6* 8 0* 8 1* 8 1* 7 7* 7 9*   < >  --    < >  --   --   --   --   --   --    < > 9 3   AST U/L  --   --   --   --   --   --   --   --   --   --   --   --   --   --   --   --   --   --  39   ALT U/L  --   --   --   --   --   --   --   --   --   --   --   --   --   --   --   --   --   --  29   ALK PHOS U/L  --   --   --   --   --   --   --   --   --   --   --   --   --   --   --   --   --   --  54   EGFR ml/min/1 73sq m 12  --  10 10 10 12 13 14   < >  --    < >  --   --   --   --   --   --    < > 34    < > = values in this interval not displayed  BMP:  Results from last 7 days   Lab Units 12/21/19  0519 12/20/19  1504 12/20/19  0429 12/19/19  0501 12/18/19  1806 12/18/19  0358 12/17/19  0813 12/17/19  0433  12/15/19  1619   POTASSIUM mmol/L 3 4* 4 1 3 0* 3 2* 4 0 3 6 4 0 4 1   < >  --    CHLORIDE mmol/L 101  --  101 101 104 106 108 110*   < >  --    CO2 mmol/L 28  --  28 28 27 24 23 24   < >  --    CO2, I-STAT mmol/L  --   --   --   --   --   --   --   --   --  24   BUN mg/dL 129*  --  127* 124* 126* 103* 79* 71*   < >  --    CREATININE mg/dL 4 38*  --  5 14* 5 10* 5 13* 4 55* 4 09* 3 97*   < >  --    GLUCOSE, ISTAT mg/dl  --   --   --   --   --   --   --   --   --  95   CALCIUM mg/dL 8 1*  --  7 6* 8 0* 8 1* 8 1* 7 7* 7 9*   < >  --     < > = values in this interval not displayed  BNP: No results for input(s): BNP in the last 72 hours      Magnesium:   Results from last 7 days   Lab Units 12/18/19  0506 12/17/19  0433 12/16/19  0356 12/15/19  0354   MAGNESIUM mg/dL 2 8* 2 8* 2 8* 1 7       Coags:   Results from last 7 days   Lab Units 12/21/19  0519 12/20/19  0429 12/19/19  0501 12/18/19  1806 19  0433 12/15/19  1607 12/15/19  0354   PTT seconds  --   --   --   --   --  41*  --    INR  3 54* 2 72* 1 40* 1 30* 1 48* 1 75* 1 27*       TSH: No results found for: TSH    Hemoglobin A1C       Lipid Profile:       Cardiac testing:   Results for orders placed during the hospital encounter of 19   Echo complete with contrast if indicated    Narrative [address]  [facility], [address] [facility]  [phone]    Transthoracic Echocardiogram  2D, M-mode, Doppler, and Color Doppler    Study date:  2019    Patient: Debi Duron  MR number: [id number]  Account number: [de-identified]  : 1940  Age: 70 years  Gender: Male  Status: Outpatient  Location: Bedside  Height: 68 in  Weight: 191 6 lb  BP: 139/ 74 mmHg    Indications: Bradycardia, hypertension, heart failure  Diagnoses: R00 1 - Bradycardia, unspecified    Sonographer:  LAUREEN Majano  Interpreting Physician:  Hafsa Melo MD  Primary Physician:  Ngoc Abad  Referring Physician:  Rocio Bowen:  [facility]  Cardiology Fellow: Amelia Maciel MD    SUMMARY    PROCEDURE INFORMATION:  This was a technically difficult study  LEFT VENTRICLE:  Systolic function was normal  Ejection fraction was estimated to be 60 %  There were no regional wall motion abnormalities  Wall thickness was mildly increased  The changes were consistent with concentric remodeling (increased wall thickness with normal wall mass)  Doppler parameters were consistent with abnormal left ventricular relaxation (grade 1 diastolic dysfunction)  MITRAL VALVE:  There was mild annular calcification  TRICUSPID VALVE:  Transtricuspid velocity was increased due to increased transvalvular flow  There was mild regurgitation  COMPARISONS:  There has been no significant interval change  Comparison was made with the previous study of 08-Mar-2017  HISTORY: PRIOR HISTORY: HTN, CKD, HLD      PROCEDURE: The procedure was performed at the bedside  This was a routine study  The transthoracic approach was used  The study included complete 2D imaging, M-mode, complete spectral Doppler, and color Doppler  The heart rate was 60 bpm,  at the start of the study  Images were obtained from the parasternal, apical, subcostal, and suprasternal notch acoustic windows  Echocardiographic views were limited due to chest wall deformity  This was a technically difficult study  LEFT VENTRICLE: Size was normal  Systolic function was normal  Ejection fraction was estimated to be 60 %  There were no regional wall motion abnormalities  Wall thickness was mildly increased  The changes were consistent with concentric  remodeling (increased wall thickness with normal wall mass)  DOPPLER: Doppler parameters were consistent with abnormal left ventricular relaxation (grade 1 diastolic dysfunction)  RIGHT VENTRICLE: The size was normal  Systolic function was normal     LEFT ATRIUM: Size was normal     RIGHT ATRIUM: Size was normal     MITRAL VALVE: There was mild annular calcification  There was normal leaflet separation  DOPPLER: The transmitral velocity was within the normal range  There was no evidence for stenosis  There was no regurgitation  AORTIC VALVE: The valve was trileaflet  Leaflets exhibited normal thickness, mild to moderate calcification, normal cuspal separation, and sclerosis  DOPPLER: Transaortic velocity was within the normal range  There was no evidence for  stenosis  There was no regurgitation  TRICUSPID VALVE: There was normal leaflet separation  DOPPLER: Transtricuspid velocity was increased due to increased transvalvular flow  There was no evidence for stenosis  There was mild regurgitation  Pulmonary artery systolic pressure  was within the normal range  Estimated peak PA pressure was 35 mmHg  PULMONIC VALVE: DOPPLER: The transpulmonic velocity was within the normal range   There was trace regurgitation  PERICARDIUM: There was no pericardial effusion  AORTA: The root exhibited normal size  SYSTEMIC VEINS: IVC: The inferior vena cava was not well visualized  HEPATIC VEINS: The hepatic veins were not well visualized  PULMONARY VEINS: Not well visualized  SYSTEM MEASUREMENT TABLES    2D  %FS: 35 38 %  Ao Diam: 3 24 cm  EDV(Teich): 83 73 ml  EF(Teich): 65 08 %  ESV(Teich): 29 24 ml  IVSd: 1 4 cm  LA Diam: 3 94 cm  LVEDV MOD A4C: 83 8 ml  LVEF MOD A4C: 57 %  LVESV MOD A4C: 36 04 ml  LVIDd: 4 31 cm  LVIDs: 2 79 cm  LVLd A4C: 8 31 cm  LVLs A4C: 6 37 cm  LVPWd: 1 17 cm  RVIDd: 3 96 cm  SV MOD A4C: 47 77 ml  SV(Teich): 54 49 ml    CW  AV Env  Ti: 327 57 ms  AV VTI: 43 76 cm  AV Vmax: 1 89 m/s  AV Vmean: 1 34 m/s  AV maxP 32 mmHg  AV meanP 89 mmHg  TR Vmax: 2 6 m/s  TR maxP mmHg    MM  TAPSE: 3 12 cm    PW  E': 0 06 m/s  E/E': 12 75  LVOT Env  Ti: 364 48 ms  LVOT VTI: 24 83 cm  LVOT Vmax: 1 m/s  LVOT Vmean: 0 68 m/s  LVOT maxPG: 3 96 mmHg  LVOT meanP 16 mmHg  MV A Chet: 0 74 m/s  MV Dec [place]han: 2 56 m/s2  MV DecT: 297 05 ms  MV E Chet: 0 76 m/s  MV E/A Ratio: 1 03  MV PHT: 86 14 ms  MVA By PHT: 2 55 cm2    [employer] Accredited Echocardiography [facility]    Prepared and electronically signed by    Jerilyn Aburto MD  Signed 2019 13:47:28       No results found for this or any previous visit  No results found for this or any previous visit  No results found for this or any previous visit      Meds/Allergies   current meds:   Current Facility-Administered Medications   Medication Dose Route Frequency    acetaminophen (TYLENOL) tablet 650 mg  650 mg Oral Q4H PRN    amiodarone tablet 200 mg  200 mg Oral Q8H [facility]    aspirin chewable tablet 81 mg  81 mg Oral Daily    atorvastatin (LIPITOR) tablet 80 mg  80 mg Oral Daily With Dinner    bisacodyl (DULCOLAX) rectal suppository 10 mg  10 mg Rectal Daily PRN    docusate sodium (COLACE) capsule 100 mg  100 mg Oral BID  insulin lispro (HumaLOG) 100 units/mL subcutaneous injection 1-5 Units  1-5 Units Subcutaneous TID AC    insulin lispro (HumaLOG) 100 units/mL subcutaneous injection 1-5 Units  1-5 Units Subcutaneous HS    midodrine (PROAMATINE) tablet 2 5 mg  2 5 mg Oral TID AC    ondansetron (ZOFRAN) injection 4 mg  4 mg Intravenous Q6H PRN    oxyCODONE-acetaminophen (PERCOCET) 5-325 mg per tablet 1 tablet  1 tablet Oral Q4H PRN    oxyCODONE-acetaminophen (PERCOCET) 5-325 mg per tablet 2 tablet  2 tablet Oral Q6H PRN    pantoprazole (PROTONIX) EC tablet 40 mg  40 mg Oral Daily    polyethylene glycol (MIRALAX) packet 17 g  17 g Oral Daily    potassium chloride (K-DUR,KLOR-CON) CR tablet 40 mEq  40 mEq Oral Daily    temazepam (RESTORIL) capsule 15 mg  15 mg Oral HS PRN     Medications Prior to Admission   Medication    amLODIPine (NORVASC) 10 mg tablet    FLUAD 0 5 ML GEORGIE    hydrALAZINE (APRESOLINE) 25 mg tablet    hydrALAZINE (APRESOLINE) 50 mg tablet    hydrochlorothiazide (HYDRODIURIL) 25 mg tablet    losartan (COZAAR) 100 MG tablet    losartan (COZAAR) 50 mg tablet    losartan-hydrochlorothiazide (HYZAAR) 100-25 MG per tablet    omeprazole (PriLOSEC) 20 mg delayed release capsule    SHINGRIX 50 MCG SUSR    tamsulosin (FLOMAX) 0 4 mg          Assessment:  Principal Problem:    Dissection of thoracic aorta (HCC)  Active Problems:    Benign essential hypertension    Abnormal TSH    Benign hypertension with CKD (chronic kidney disease) stage III (HCC)    Benign prostatic hyperplasia without lower urinary tract symptoms    S/P aorta repair    Leukocytosis    Thrombocytopenia (HCC)    Postoperative anemia due to acute blood loss    Anticoagulation goal of INR 2 to 3    Plan:    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

## 2019-12-21 NOTE — PROGRESS NOTES
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, Ascending aortic aneurysm     S/P Replacement of ascending aorta with aggressive hemiarch reconstruction and aortic valve resuspension with a 26 mm Dacron graft; POD # 6      24 Hour Events: Second episode of afib yesterday  Patient was unable to tolerate beta blocker due to hypotension, and amiodarone due to prolonged QTc  Repeat EKG shows improving QTc, restart PO amio yesterday afternoon    Off diuretics for AFSHIN on CKD, good UOP    Medications:   Scheduled Meds:    Current Facility-Administered Medications:  acetaminophen 650 mg Oral Q4H PRN Adonis Barry PA-C   amiodarone 200 mg Oral [facility] Alex Sullivan PA-C   aspirin 81 mg Oral Daily Sumaya Perry PA-C   atorvastatin 80 mg Oral Daily With [employer]ny Linder PA-C   bisacodyl 10 mg Rectal Daily PRN Diana Linder PA-C   docusate sodium 100 mg Oral BID Diana Linder PA-C   heparin (porcine) 5,000 Units Subcutaneous [facility] [address] Alex Linder PA-C   insulin lispro 1-5 Units Subcutaneous TID [facility] Alex Linder PA-C   insulin lispro 1-5 Units Subcutaneous HS Alex Linder PA-C   metoprolol tartrate 12 5 mg Oral Q12H [address] Sumaya Perry PA-C   ondansetron 4 mg Intravenous Q6H PRN Adonis Barry PA-C   oxyCODONE-acetaminophen 1 tablet Oral Q4H PRN Adonis Barry PA-C   oxyCODONE-acetaminophen 2 tablet Oral Q6H PRN Diana Linder PA-C   pantoprazole 40 mg Oral Daily Alex Linder PA-C   polyethylene glycol 17 g Oral Daily Alex Linder PA-C   potassium chloride 40 mEq Oral Daily Sumaya Perry PA-C   temazepam 15 mg Oral HS PRN Diana Linder PA-C     Continuous Infusions:     PRN Meds:   acetaminophen    bisacodyl    ondansetron    oxyCODONE-acetaminophen    oxyCODONE-acetaminophen    temazepam    Vitals:   Vitals:    12/20/19 2226 12/20/19 2244 12/21/19 0326 12/21/19 0600 BP: 102/71  94/61    BP Location: Left arm  Left arm    Pulse: 68  73    Resp: 20 19    Temp:  97 7 °F (36 5 °C) 98 7 °F (37 1 °C)    TempSrc:  Oral Oral    SpO2:   96%    Weight:    81 2 kg (179 lb 1 6 oz)   Height:           Telemetry: NSR; Heart Rate: 74    Respiratory:   SpO2: SpO2: 96 %; 1 LPM     Intake/Output:     Intake/Output Summary (Last 24 hours) at 12/21/2019 0602  Last data filed at 12/20/2019 2242  Gross per 24 hour   Intake 1150 ml   Output 2395 ml   Net -1245 ml   UOP 2 4L/24hrs        Weights:   Weight (last 2 days)     Date/Time   Weight    12/21/19 0600   81 2 (179 1)    12/20/19 0700   80 9 (178 35)            Admit weight: 79 4 kg    Results:   Results from last 7 days   Lab Units 12/21/19 0519 12/20/19 0429 12/19/19  0501   WBC Thousand/uL 14 44* 12 22* 13 95*   HEMOGLOBIN g/dL 10 9* 9 8* 10 4*   HEMATOCRIT % 32 9* 29 8* 31 5*   PLATELETS Thousands/uL 136* 103* 111*     Results from last 7 days   Lab Units 12/21/19  0519 12/20/19  1504 12/20/19  0429 12/19/19  0501  12/15/19  1619   SODIUM mmol/L 137  --  139 141   < >  --    POTASSIUM mmol/L 3 4* 4 1 3 0* 3 2*   < >  --    CHLORIDE mmol/L 101  --  101 101   < >  --    CO2 mmol/L 28  --  28 28   < >  --    CO2, I-STAT mmol/L  --   --   --   --   --  24   BUN mg/dL 129*  --  127* 124*   < >  --    CREATININE mg/dL 4 38*  --  5 14* 5 10*   < >  --    GLUCOSE, ISTAT mg/dl  --   --   --   --   --  95   CALCIUM mg/dL 8 1*  --  7 6* 8 0*   < >  --     < > = values in this interval not displayed  Results from last 7 days   Lab Units 12/21/19  0519 12/20/19  0429 12/19/19  0501  12/15/19  1607   INR  3 54* 2 72* 1 40*   < > 1 75*   PTT seconds  --   --   --   --  41*    < > = values in this interval not displayed     12/20 Coumadin 2 mg  12/19 Coumadin 5 mg  12/18 Coumadin 5 mg      Point of care glucose: 108-159    Studies:  No new studies    Invasive Lines/Tubes:  Invasive Devices     Central Venous Catheter Line            CVC Central Lines 12/15/19 Triple 5 days          Peripheral Intravenous Line            Peripheral IV 12/19/19 Left;Ventral (anterior) Forearm 2 days                  Physical Exam:    HEENT/NECK:  PERRLA  No jugular venous distention  Cardiac: Regular rate and rhythm and No murmurs/rubs/gallops  Pulmonary:  Breath sounds diminished in the bases bilaterally  and No rales/rhonchi/wheezes  Abdomen:  Non-tender, Non-distended and Normal bowel sounds  Incisions: Sternum is stable  Incision is clean, dry, and intact  Extremities: Extremities warm/dry and No edema B/L  Neuro: Alert and oriented X 3, Sensation is grossly intact and No focal deficits  Skin: Warm/Dry, without rashes or lesions  Assessment:  Principal Problem:    Dissection of thoracic aorta (HCC)  Active Problems:    Benign essential hypertension    Abnormal TSH    Benign hypertension with CKD (chronic kidney disease) stage III (HCC)    Benign prostatic hyperplasia without lower urinary tract symptoms    S/P aorta repair    Hypokalemia    Leukocytosis    Thrombocytopenia (HCC)    Postoperative anemia due to acute blood loss    Anticoagulation goal of INR 2 to 3       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, Ascending aortic aneurysm     S/P Replacement of ascending aorta with aggressive hemiarch reconstruction and aortic valve resuspension with a 26 mm Dacron graft; POD # 6    Plan:    1  Cardiac:   NSR; Hypotensive  Continue PO amiodarone  Hold beta blocker for hypotension  Continue ASA and Statin therapy  Epicardial pacing wires out  Discontinue central IV access today  Continue DVT prophylaxis, INR 3 54, hold Coumadin tonight      2  Pulmonary:   Acute post-op pulmonary insufficiency; Requiring 2 Liters via nasal cannula, secondary to poor inspiratory effort secondary to pain  Continue incentive spirometry/coughing/deep breathing exercises    Wean supplemental oxygen as tolerated for saturation > 90%  Chest tubes have been discontinued    3  Renal:   Intake/Output net: -1 2 L/24 hours  Hold diuresis today  Continue Potassium Chloride 40 mEq PO   Post op Creatinine elevated, Cr 4 38; Follow up labs prn  Nephrology following  Hills catheter removed yesterday    4  Neuro:  Neurologically intact; No active issues  Incisional pain well-controlled; Continue prn Percocet    5  GI:  Tolerating TLC 2 3 gm sodium diet  Maintain 1800 mL daily fluid restriction   Continue stool softeners and prn suppository  Continue GI prophylaxis    6  Endo:   No history of diabetes; Glucose well-controlled with sliding scale coverage    7    Hematology:   Post-operative blood count acceptable; Trend prn    Leukocytosis resolving , WBC 14 (12)   Thrombocytopenia resolving , plts 136 (104)    8  Disposition:      Not yet ambulating independently, Anticipate discharge to home when medically ready     VTE Pharmacologic Prophylaxis: Heparin  VTE Mechanical Prophylaxis: sequential compression device    Collaborative rounds completed with NEETU Wilburn    Plan of care discussed with bedside nurse    SIGNATURE: Aditi Hidalgo PA-C  DATE: December 21, 2019  TIME: 6:02 AM

## 2019-12-21 NOTE — PROGRESS NOTES
NEPHROLOGY PROGRESS NOTE   [facility] A Core 70 y o  male MRN: [id number]  Unit/Bed#: [facility] 406-01 Encounter: [id number]      ASSESSMENT & PLAN:  1  Acute kidney injury on top of chronic kidney disease stage 3  Secondary to postoperative ATN plus minus contrast nephropathy  Creatinine on admission 1 8 and gradually worsened, creatinine peak at 5 4 yesterday  Renal function improving, creatinine down to 4 38 today  Good urine output offr diuretics  Avoid hypotension, continue with albumin bolus p r n  Monitor ins and outs  Follow labs  2  Chronic kidney disease stage 3 with baseline creatinine 1 4-1 8  3  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 status post repair on 12/15  4  Hemodynamics, blood pressure stable in the lower side, keep holding diuretics, continue with albumin bolus p r n     Try to keep systolic blood pressure over 120 in the setting of acute renal failure  Consider midodrine if no contraindication  5  Anemia, monitor H&H and transfusion as needed  6  Hypokalemia, continue with replacement, follow labs      SUBJECTIVE:  Patient seen and examined denies any complaints chest pain or shortness of breath, abdominal pain, nausea or vomiting      OBJECTIVE:  Current Weight: Weight - Scale: 81 2 kg (179 lb 1 6 oz)  Vitals:    12/21/19 0800   BP:    Pulse:    Resp:    Temp:    SpO2: 95%       Intake/Output Summary (Last 24 hours) at 12/21/2019 9691  Last data filed at 12/21/2019 0820  Gross per 24 hour   Intake 1270 ml   Output 1795 ml   Net -525 ml     General: conscious, cooperative, in not acute distress  Eyes: conjunctivae pale, anicteric sclerae  ENT: lips and mucous membranes moist  Neck: supple, no JVD  Chest: clear breath sounds bilateral, no crackles, ronchus or wheezings  CVS: distinct S1 & S2, normal rate, regular rhythm  Abdomen: soft, non-tender, non-distended, normoactive bowel sounds  Extremities:  No significant edema of both legs  Skin: no rash  Neuro: awake, alert, oriented      Medications:    Current Facility-Administered Medications:     acetaminophen (TYLENOL) tablet 650 mg, 650 mg, Oral, Q4H PRN, Chuy Dozier PA-C    amiodarone tablet 200 mg, 200 mg, Oral, Q8H [address], Bro Linder PA-C, 200 mg at 12/21/19 9263    aspirin chewable tablet 81 mg, 81 mg, Oral, Daily, Sumaya Perry PA-C, 81 mg at 12/20/19 8002    atorvastatin (LIPITOR) tablet 80 mg, 80 mg, Oral, Daily With Paco Osman PA-C, 80 mg at 12/20/19 1540    bisacodyl (DULCOLAX) rectal suppository 10 mg, 10 mg, Rectal, Daily PRN, Bro Linder PA-C    docusate sodium (COLACE) capsule 100 mg, 100 mg, Oral, BID, Alex Linder PA-C, 100 mg at 12/20/19 1801    heparin (porcine) subcutaneous injection 5,000 Units, 5,000 Units, Subcutaneous, Q8H [address], Chuy Dozier PA-C, 5,000 Units at 12/21/19 0511    insulin lispro (HumaLOG) 100 units/mL subcutaneous injection 1-5 Units, 1-5 Units, Subcutaneous, TID AC, 1 Units at 12/20/19 1544 **AND** Fingerstick Glucose (POCT), , , TID AC, Alex Linder PA-C    insulin lispro (HumaLOG) 100 units/mL subcutaneous injection 1-5 Units, 1-5 Units, Subcutaneous, HS, Chuy Dozier PA-C, 1 Units at 12/20/19 2121    ondansetron [facility]) injection 4 mg, 4 mg, Intravenous, Q6H PRN, Bro Linder PA-C    oxyCODONE-acetaminophen (PERCOCET) 5-325 mg per tablet 1 tablet, 1 tablet, Oral, Q4H PRN, Chuy Dozier PA-C, 1 tablet at 12/21/19 0513    oxyCODONE-acetaminophen (PERCOCET) 5-325 mg per tablet 2 tablet, 2 tablet, Oral, Q6H PRN, Chuy Dozier PA-C, 2 tablet at 12/20/19 0745    pantoprazole (PROTONIX) EC tablet 40 mg, 40 mg, Oral, Daily, Alex Linder PA-C, 40 mg at 12/20/19 0915    polyethylene glycol (MIRALAX) packet 17 g, 17 g, Oral, Daily, Bro Linder PA-C, 17 g at 12/20/19 0915    potassium chloride (K-DUR,KLOR-CON) CR tablet 40 mEq, 40 mEq, Oral, Daily, Sumaya Perry PA-C, 40 mEq at 12/20/19 0914    temazepam (RESTORIL) capsule 15 mg, 15 mg, Oral, HS PRN, Collin Gaffney PA-C    Invasive Devices:   Urethral Catheter Non-latex; Temperature probe (Active)   Reasons to continue Urinary Catheter  Accurate I&O assessment in critically ill patients (48 hr  max) 12/20/2019 12:17 AM   Goal for Removal Remove after 48 hrs of I/O monitoring 12/20/2019  8:23 AM   Site Assessment Clean;Skin intact 12/20/2019 12:17 AM   Collection Container Standard drainage bag 12/20/2019 12:17 AM   Securement Method Securing device (Describe) 12/20/2019 12:17 AM   Output (mL) 575 mL 12/20/2019 12:17 AM       Lab Results:   Results from last 7 days   Lab Units 12/21/19  0519 12/20/19  1504 12/20/19  0429 12/19/19  0501  12/18/19  0506  12/17/19  0433  12/16/19  0356  12/15/19  1619  12/15/19  1458 12/15/19  1412  12/14/19 2005   WBC Thousand/uL 14 44*  --  12 22* 13 95*   < >  --   --  19 72*  --  21 18*  --   --   --   --   --    < > 9 07   HEMOGLOBIN g/dL 10 9*  --  9 8* 10 4*   < >  --   --  10 3*  --  11 3*   < >  --    < >  --   --    < > 14 8   I STAT HEMOGLOBIN g/dl  --   --   --   --   --   --   --   --   --   --   --  11 9*  --  10 5* 10 2*   < >  --    HEMATOCRIT % 32 9*  --  29 8* 31 5*   < >  --   --  31 5*  --  34 4*   < >  --    < >  --   --    < > 44 3   HEMATOCRIT, ISTAT %  --   --   --   --   --   --   --   --   --   --   --  35*  --  31* 30*   < >  --    PLATELETS Thousands/uL 136*  --  103* 111*   < >  --   --  86*  --  147*  --   --    < >  --   --    < > 169   SODIUM mmol/L 137  --  139 141   < >  --    < > 144   < > 147*  --   --    < >  --   --    < > 141   POTASSIUM mmol/L 3 4* 4 1 3 0* 3 2*   < >  --    < > 4 1   < > 4 1   < >  --    < >  --   --    < > 4 5   CHLORIDE mmol/L 101  --  101 101   < >  --    < > 110*   < > 115*  --   --    < >  --   --    < > 110*   CO2 mmol/L 28  --  28 28   < >  --    < > 24   < > 23  --   --    < >  --   --    < > 26   CO2, I-STAT mmol/L  --   --   --   --   --   --   --   --   --   --   --  24  --  24 26   < >  --    BUN mg/dL 129*  -- 127* 124*   < >  --    < > 71*   < > 44*  --   --    < >  --   --    < > 31*   CREATININE mg/dL 4 38*  --  5 14* 5 10*   < >  --    < > 3 97*   < > 2 97*  --   --    < >  --   --    < > 1 86*   CALCIUM mg/dL 8 1*  --  7 6* 8 0*   < >  --    < > 7 9*   < > 8 3  --   --    < >  --   --    < > 9 3   MAGNESIUM mg/dL  --   --   --   --   --  2 8*  --  2 8*  --  2 8*  --   --   --   --   --    < >  --    ALK PHOS U/L  --   --   --   --   --   --   --   --   --   --   --   --   --   --   --   --  54   ALT U/L  --   --   --   --   --   --   --   --   --   --   --   --   --   --   --   --  29   AST U/L  --   --   --   --   --   --   --   --   --   --   --   --   --   --   --   --  39   GLUCOSE, ISTAT mg/dl  --   --   --   --   --   --   --   --   --   --   --  95  --  104 131   < >  --     < > = values in this interval not displayed  Portions of the record may have been created with voice recognition software  Occasional wrong word or "sound a like" substitutions may have occurred due to the inherent limitations of voice recognition software  Read the chart carefully and recognize, using context, where substitutions have occurred  If you have any questions, please contact the dictating provider

## 2019-12-22 LAB
ANION GAP SERPL CALCULATED.3IONS-SCNC: 7 MMOL/L (ref 4–13)
BUN SERPL-MCNC: 125 MG/DL (ref 5–25)
CALCIUM SERPL-MCNC: 8.1 MG/DL (ref 8.3–10.1)
CHLORIDE SERPL-SCNC: 103 MMOL/L (ref 100–108)
CO2 SERPL-SCNC: 28 MMOL/L (ref 21–32)
CREAT SERPL-MCNC: 4.33 MG/DL (ref 0.6–1.3)
ERYTHROCYTE [DISTWIDTH] IN BLOOD BY AUTOMATED COUNT: 13.9 % (ref 11.6–15.1)
GFR SERPL CREATININE-BSD FRML MDRD: 12 ML/MIN/1.73SQ M
GLUCOSE SERPL-MCNC: 107 MG/DL (ref 65–140)
GLUCOSE SERPL-MCNC: 115 MG/DL (ref 65–140)
GLUCOSE SERPL-MCNC: 133 MG/DL (ref 65–140)
GLUCOSE SERPL-MCNC: 142 MG/DL (ref 65–140)
GLUCOSE SERPL-MCNC: 147 MG/DL (ref 65–140)
HCT VFR BLD AUTO: 36.7 % (ref 36.5–49.3)
HGB BLD-MCNC: 11.9 G/DL (ref 12–17)
INR PPP: 2.75 (ref 0.84–1.19)
MCH RBC QN AUTO: 30.2 PG (ref 26.8–34.3)
MCHC RBC AUTO-ENTMCNC: 32.4 G/DL (ref 31.4–37.4)
MCV RBC AUTO: 93 FL (ref 82–98)
PLATELET # BLD AUTO: 216 THOUSANDS/UL (ref 149–390)
PMV BLD AUTO: 11.3 FL (ref 8.9–12.7)
POTASSIUM SERPL-SCNC: 4.1 MMOL/L (ref 3.5–5.3)
PROTHROMBIN TIME: 28.6 SECONDS (ref 11.6–14.5)
RBC # BLD AUTO: 3.94 MILLION/UL (ref 3.88–5.62)
SODIUM SERPL-SCNC: 138 MMOL/L (ref 136–145)
WBC # BLD AUTO: 16.09 THOUSAND/UL (ref 4.31–10.16)

## 2019-12-22 PROCEDURE — 82948 REAGENT STRIP/BLOOD GLUCOSE: CPT

## 2019-12-22 PROCEDURE — 99232 SBSQ HOSP IP/OBS MODERATE 35: CPT | Performed by: INTERNAL MEDICINE

## 2019-12-22 PROCEDURE — 85027 COMPLETE CBC AUTOMATED: CPT | Performed by: PHYSICIAN ASSISTANT

## 2019-12-22 PROCEDURE — 85610 PROTHROMBIN TIME: CPT | Performed by: PHYSICIAN ASSISTANT

## 2019-12-22 PROCEDURE — 99024 POSTOP FOLLOW-UP VISIT: CPT | Performed by: PHYSICIAN ASSISTANT

## 2019-12-22 PROCEDURE — 80048 BASIC METABOLIC PNL TOTAL CA: CPT | Performed by: PHYSICIAN ASSISTANT

## 2019-12-22 RX ORDER — MIDODRINE HYDROCHLORIDE 5 MG/1
5 TABLET ORAL
Status: DISCONTINUED | OUTPATIENT
Start: 2019-12-22 | End: 2019-12-24

## 2019-12-22 RX ORDER — WARFARIN SODIUM 2.5 MG/1
2.5 TABLET ORAL
Status: DISCONTINUED | OUTPATIENT
Start: 2019-12-22 | End: 2019-12-22

## 2019-12-22 RX ORDER — GUAIFENESIN 600 MG
600 TABLET, EXTENDED RELEASE 12 HR ORAL EVERY 12 HOURS SCHEDULED
Status: DISCONTINUED | OUTPATIENT
Start: 2019-12-22 | End: 2020-01-07 | Stop reason: HOSPADM

## 2019-12-22 RX ORDER — WARFARIN SODIUM 1 MG/1
1 TABLET ORAL
Status: COMPLETED | OUTPATIENT
Start: 2019-12-22 | End: 2019-12-22

## 2019-12-22 RX ADMIN — POTASSIUM CHLORIDE 40 MEQ: 1500 TABLET, EXTENDED RELEASE ORAL at 08:28

## 2019-12-22 RX ADMIN — WARFARIN SODIUM 1 MG: 1 TABLET ORAL at 17:31

## 2019-12-22 RX ADMIN — DOCUSATE SODIUM 100 MG: 100 CAPSULE, LIQUID FILLED ORAL at 17:31

## 2019-12-22 RX ADMIN — AMIODARONE HYDROCHLORIDE 200 MG: 200 TABLET ORAL at 22:16

## 2019-12-22 RX ADMIN — MIDODRINE HYDROCHLORIDE 5 MG: 5 TABLET ORAL at 12:39

## 2019-12-22 RX ADMIN — MIDODRINE HYDROCHLORIDE 5 MG: 5 TABLET ORAL at 17:31

## 2019-12-22 RX ADMIN — AMIODARONE HYDROCHLORIDE 200 MG: 200 TABLET ORAL at 15:00

## 2019-12-22 RX ADMIN — AMIODARONE HYDROCHLORIDE 200 MG: 200 TABLET ORAL at 05:03

## 2019-12-22 RX ADMIN — GUAIFENESIN 600 MG: 600 TABLET, EXTENDED RELEASE ORAL at 08:28

## 2019-12-22 RX ADMIN — MIDODRINE HYDROCHLORIDE 2.5 MG: 5 TABLET ORAL at 06:12

## 2019-12-22 RX ADMIN — GUAIFENESIN 600 MG: 600 TABLET, EXTENDED RELEASE ORAL at 22:16

## 2019-12-22 RX ADMIN — METOPROLOL TARTRATE 12.5 MG: 25 TABLET ORAL at 22:13

## 2019-12-22 RX ADMIN — PANTOPRAZOLE SODIUM 40 MG: 40 TABLET, DELAYED RELEASE ORAL at 08:28

## 2019-12-22 RX ADMIN — OXYCODONE HYDROCHLORIDE AND ACETAMINOPHEN 1 TABLET: 5; 325 TABLET ORAL at 05:03

## 2019-12-22 RX ADMIN — DOCUSATE SODIUM 100 MG: 100 CAPSULE, LIQUID FILLED ORAL at 08:28

## 2019-12-22 RX ADMIN — ATORVASTATIN CALCIUM 80 MG: 80 TABLET, FILM COATED ORAL at 17:31

## 2019-12-22 RX ADMIN — ASPIRIN 81 MG 81 MG: 81 TABLET ORAL at 08:28

## 2019-12-22 RX ADMIN — POLYETHYLENE GLYCOL 3350 17 G: 17 POWDER, FOR SOLUTION ORAL at 08:28

## 2019-12-22 NOTE — PROGRESS NOTES
Cardiology Progress Note - Azucena Mcmahon [facility] 70 y o  male MRN: [id number]    Unit/Bed#: [facility] 406-01 Encounter: [id number]        Subjective:    No significant events overnight  Back in AF  BP remains low  ROS    Objective:   Vitals: Blood pressure (!) 86/52, pulse 74, temperature 97 9 °F (36 6 °C), temperature source Oral, resp  rate 18, height 5' 9" (1 753 m), weight 81 2 kg (179 lb 1 6 oz), SpO2 99 %  , Body mass index is 26 45 kg/m² ,   Orthostatic Blood Pressures      Most Recent Value   Blood Pressure  (!) 86/52 filed at 12/22/2019 1045   Patient Position - Orthostatic VS  Sitting filed at 12/22/2019 0154         Systolic (16JQA), EZQ:007 , Min:86 , CROW:681     Diastolic (92BOM), QGV:02, Min:52, Max:76      Intake/Output Summary (Last 24 hours) at 12/22/2019 1310  Last data filed at 12/22/2019 0101  Gross per 24 hour   Intake 480 ml   Output 1100 ml   Net -620 ml     Weight (last 2 days)     Date/Time   Weight    12/22/19 0457   81 2 (179 1)    12/21/19 0600   81 2 (179 1)    12/20/19 0700   80 9 (178 35)                Telemetry Review: No significant arrhythmias seen on telemetry review  AF      Physical Exam   Constitutional: He is oriented to person, place, and time  No distress  HENT:   Mouth/Throat: No oropharyngeal exudate  Eyes: No scleral icterus  Neck: No JVD present  Cardiovascular: Normal rate  An irregularly irregular rhythm present  Pulmonary/Chest: Effort normal and breath sounds normal  No stridor  No respiratory distress  He has no wheezes  Abdominal: Soft  Bowel sounds are normal  He exhibits no distension  There is no tenderness  There is no guarding  Musculoskeletal: He exhibits no edema  Neurological: He is alert and oriented to person, place, and time  Skin: Skin is warm and dry  He is not diaphoretic  Psychiatric: He has a normal mood and affect   His behavior is normal          Laboratory Results:        CBC with diff:   Results from last 7 days   Lab Units 12/22/19  0543 12/21/19  0519 12/20/19  0429 12/19/19  0501 12/18/19  0646 12/17/19  0433 12/16/19  0356   WBC Thousand/uL 16 09* 14 44* 12 22* 13 95* 17 15* 19 72* 21 18*   HEMOGLOBIN g/dL 11 9* 10 9* 9 8* 10 4* 10 5* 10 3* 11 3*   HEMATOCRIT % 36 7 32 9* 29 8* 31 5* 31 9* 31 5* 34 4*   MCV fL 93 91 92 92 93 93 93   PLATELETS Thousands/uL 216 136* 103* 111* 104* 86* 147*   MCH pg 30 2 30 3 30 2 30 5 30 4 30 4 30 5   MCHC g/dL 32 4 33 1 32 9 33 0 32 9 32 7 32 8   RDW % 13 9 13 8 13 7 14 2 14 4 14 4 13 9   MPV fL 11 3 11 6 11 2 12 0 11 6 11 3 10 3   NRBC AUTO /100 WBCs  --   --   --   --   --  0  --          CMP:  Results from last 7 days   Lab Units 12/22/19  0543 12/21/19  0519 12/20/19  1504 12/20/19  0429 12/19/19  0501 12/18/19  1806 12/18/19  0358 12/17/19  0813  12/15/19  1619  12/15/19  1458 12/15/19  1412 12/15/19  1342 12/15/19  1312   POTASSIUM mmol/L 4 1 3 4* 4 1 3 0* 3 2* 4 0 3 6 4 0   < >  --    < >  --   --   --   --    CHLORIDE mmol/L 103 101  --  101 101 104 106 108   < >  --    < >  --   --   --   --    CO2 mmol/L 28 28  --  28 28 27 24 23   < >  --    < >  --   --   --   --    CO2, I-STAT mmol/L  --   --   --   --   --   --   --   --   --  24  --  24 26 26 29   BUN mg/dL 125* 129*  --  127* 124* 126* 103* 79*   < >  --    < >  --   --   --   --    CREATININE mg/dL 4 33* 4 38*  --  5 14* 5 10* 5 13* 4 55* 4 09*   < >  --    < >  --   --   --   --    GLUCOSE, ISTAT mg/dl  --   --   --   --   --   --   --   --   --  95  --  104 131 155* 164*   CALCIUM mg/dL 8 1* 8 1*  --  7 6* 8 0* 8 1* 8 1* 7 7*   < >  --    < >  --   --   --   --    EGFR ml/min/1 73sq m 12 12  --  10 10 10 12 13   < >  --    < >  --   --   --   --     < > = values in this interval not displayed           BMP:  Results from last 7 days   Lab Units 12/22/19  0543 12/21/19  0519 12/20/19  1504 12/20/19  0429 12/19/19  0501 12/18/19  1806 12/18/19  0358 12/17/19  0813  12/15/19  1619   POTASSIUM mmol/L 4 1 3 4* 4 1 3 0* 3 2* 4 0 3 6 4 0 < >  --    CHLORIDE mmol/L 103 101  --  101 101 104 106 108   < >  --    CO2 mmol/L 28 28  --  28 28 27 24 23   < >  --    CO2, I-STAT mmol/L  --   --   --   --   --   --   --   --   --  24   BUN mg/dL 125* 129*  --  127* 124* 126* 103* 79*   < >  --    CREATININE mg/dL 4 33* 4 38*  --  5 14* 5 10* 5 13* 4 55* 4 09*   < >  --    GLUCOSE, ISTAT mg/dl  --   --   --   --   --   --   --   --   --  95   CALCIUM mg/dL 8 1* 8 1*  --  7 6* 8 0* 8 1* 8 1* 7 7*   < >  --     < > = values in this interval not displayed  BNP: No results for input(s): BNP in the last 72 hours  Magnesium:   Results from last 7 days   Lab Units 19  0506 19  0433 19  0356   MAGNESIUM mg/dL 2 8* 2 8* 2 8*       Coags:   Results from last 7 days   Lab Units 19  0544 19  0519 19  0429 19  0501 19  1806 19  0433 12/15/19  1607   PTT seconds  --   --   --   --   --   --  41*   INR  2 75* 3 54* 2 72* 1 40* 1 30* 1 48* 1 75*       TSH: No results found for: TSH    Hemoglobin A1C       Lipid Profile:       Cardiac testing:   Results for orders placed during the hospital encounter of 19   Echo complete with contrast if indicated    Narrative [facility] [address]  [address]  [phone]    Transthoracic Echocardiogram  2D, M-mode, Doppler, and Color Doppler    Study date:  2019    Patient: Guanako Lopez  MR number: [id number]  Account number: [de-identified]  : 1940  Age: 70 years  Gender: Male  Status: Outpatient  Location: Bedside  Height: 68 in  Weight: 191 6 lb  BP: 139/ 74 mmHg    Indications: Bradycardia, hypertension, heart failure  Diagnoses: R00 1 - Bradycardia, unspecified    Sonographer:  LAUREEN Allen  Interpreting Physician:  Praneeth Shaw MD  Primary Physician:  Malou Machado  Referring Physician:  Fabiola Horne:  [facility]  Cardiology Fellow:   Chance Keys, MD    SUMMARY    PROCEDURE INFORMATION:  This was a technically difficult study  LEFT VENTRICLE:  Systolic function was normal  Ejection fraction was estimated to be 60 %  There were no regional wall motion abnormalities  Wall thickness was mildly increased  The changes were consistent with concentric remodeling (increased wall thickness with normal wall mass)  Doppler parameters were consistent with abnormal left ventricular relaxation (grade 1 diastolic dysfunction)  MITRAL VALVE:  There was mild annular calcification  TRICUSPID VALVE:  Transtricuspid velocity was increased due to increased transvalvular flow  There was mild regurgitation  COMPARISONS:  There has been no significant interval change  Comparison was made with the previous study of 08-Mar-2017  HISTORY: PRIOR HISTORY: HTN, CKD, HLD  PROCEDURE: The procedure was performed at the bedside  This was a routine study  The transthoracic approach was used  The study included complete 2D imaging, M-mode, complete spectral Doppler, and color Doppler  The heart rate was 60 bpm,  at the start of the study  Images were obtained from the parasternal, apical, subcostal, and suprasternal notch acoustic windows  Echocardiographic views were limited due to chest wall deformity  This was a technically difficult study  LEFT VENTRICLE: Size was normal  Systolic function was normal  Ejection fraction was estimated to be 60 %  There were no regional wall motion abnormalities  Wall thickness was mildly increased  The changes were consistent with concentric  remodeling (increased wall thickness with normal wall mass)  DOPPLER: Doppler parameters were consistent with abnormal left ventricular relaxation (grade 1 diastolic dysfunction)  RIGHT VENTRICLE: The size was normal  Systolic function was normal     LEFT ATRIUM: Size was normal     RIGHT ATRIUM: Size was normal     MITRAL VALVE: There was mild annular calcification   There was normal leaflet separation  DOPPLER: The transmitral velocity was within the normal range  There was no evidence for stenosis  There was no regurgitation  AORTIC VALVE: The valve was trileaflet  Leaflets exhibited normal thickness, mild to moderate calcification, normal cuspal separation, and sclerosis  DOPPLER: Transaortic velocity was within the normal range  There was no evidence for  stenosis  There was no regurgitation  TRICUSPID VALVE: There was normal leaflet separation  DOPPLER: Transtricuspid velocity was increased due to increased transvalvular flow  There was no evidence for stenosis  There was mild regurgitation  Pulmonary artery systolic pressure  was within the normal range  Estimated peak PA pressure was 35 mmHg  PULMONIC VALVE: DOPPLER: The transpulmonic velocity was within the normal range  There was trace regurgitation  PERICARDIUM: There was no pericardial effusion  AORTA: The root exhibited normal size  SYSTEMIC VEINS: IVC: The inferior vena cava was not well visualized  HEPATIC VEINS: The hepatic veins were not well visualized  PULMONARY VEINS: Not well visualized  SYSTEM MEASUREMENT TABLES    2D  %FS: 35 38 %  Ao Diam: 3 24 cm  EDV(Teich): 83 73 ml  EF(Teich): 65 08 %  ESV(Teich): 29 24 ml  IVSd: 1 4 cm  LA Diam: 3 94 cm  LVEDV MOD A4C: 83 8 ml  LVEF MOD A4C: 57 %  LVESV MOD A4C: 36 04 ml  LVIDd: 4 31 cm  LVIDs: 2 79 cm  LVLd A4C: 8 31 cm  LVLs A4C: 6 37 cm  LVPWd: 1 17 cm  RVIDd: 3 96 cm  SV MOD A4C: 47 77 ml  SV(Teich): 54 49 ml    CW  AV Env  Ti: 327 57 ms  AV VTI: 43 76 cm  AV Vmax: 1 89 m/s  AV Vmean: 1 34 m/s  AV maxP 32 mmHg  AV meanP 89 mmHg  TR Vmax: 2 6 m/s  TR maxP mmHg    MM  TAPSE: 3 12 cm    PW  E': 0 06 m/s  E/E': 12 75  LVOT Env  Ti: 364 48 ms  LVOT VTI: 24 83 cm  LVOT Vmax: 1 m/s  LVOT Vmean: 0 68 m/s  LVOT maxPG: 3 96 mmHg  LVOT meanP 16 mmHg  MV A Chet: 0 74 m/s  MV Dec Pickett: 2 56 m/s2  MV DecT: 297 05 ms  MV E Chet: 0 76 m/s  MV E/A Ratio: 1 03  MV PHT: 86 14 ms  MVA By PHT: 2 55 cm2    [facility]    Prepared and electronically signed by    Hafsa Melo MD  Signed 24-Apr-2019 13:47:28       No results found for this or any previous visit  No results found for this or any previous visit  No results found for this or any previous visit      Meds/Allergies   current meds:   Current Facility-Administered Medications   Medication Dose Route Frequency    acetaminophen (TYLENOL) tablet 650 mg  650 mg Oral Q4H PRN    amiodarone tablet 200 mg  200 mg Oral Q8H [address]    aspirin chewable tablet 81 mg  81 mg Oral Daily    atorvastatin (LIPITOR) tablet 80 mg  80 mg Oral Daily With Dinner    bisacodyl (DULCOLAX) rectal suppository 10 mg  10 mg Rectal Daily PRN    docusate sodium (COLACE) capsule 100 mg  100 mg Oral BID    guaiFENesin (MUCINEX) 12 hr tablet 600 mg  600 mg Oral Q12H [address]    insulin lispro (HumaLOG) 100 units/mL subcutaneous injection 1-5 Units  1-5 Units Subcutaneous TID AC    insulin lispro (HumaLOG) 100 units/mL subcutaneous injection 1-5 Units  1-5 Units Subcutaneous HS    metoprolol tartrate (LOPRESSOR) partial tablet 12 5 mg  12 5 mg Oral Q12H FILIBERTO    midodrine (PROAMATINE) tablet 5 mg  5 mg Oral TID AC    ondansetron (ZOFRAN) injection 4 mg  4 mg Intravenous Q6H PRN    oxyCODONE-acetaminophen (PERCOCET) 5-325 mg per tablet 1 tablet  1 tablet Oral Q4H PRN    oxyCODONE-acetaminophen (PERCOCET) 5-325 mg per tablet 2 tablet  2 tablet Oral Q6H PRN    pantoprazole (PROTONIX) EC tablet 40 mg  40 mg Oral Daily    polyethylene glycol (MIRALAX) packet 17 g  17 g Oral Daily    potassium chloride (K-DUR,KLOR-CON) CR tablet 40 mEq  40 mEq Oral Daily    temazepam (RESTORIL) capsule 15 mg  15 mg Oral HS PRN    warfarin (COUMADIN) tablet 1 mg  1 mg Oral Once (warfarin)     Medications Prior to Admission   Medication    amLODIPine (NORVASC) 10 mg tablet    FLUAD 0 5 ML GEORGIE    hydrALAZINE (APRESOLINE) 25 mg tablet    hydrALAZINE (APRESOLINE) 50 mg tablet    hydrochlorothiazide (HYDRODIURIL) 25 mg tablet    losartan (COZAAR) 100 MG tablet    losartan (COZAAR) 50 mg tablet    losartan-hydrochlorothiazide (HYZAAR) 100-25 MG per tablet    omeprazole (PriLOSEC) 20 mg delayed release capsule    SHINGRIX 50 MCG SUSR    tamsulosin (FLOMAX) 0 4 mg          Assessment:  Principal Problem:    Dissection of thoracic aorta (HCC)  Active Problems:    Benign essential hypertension    Abnormal TSH    Benign hypertension with CKD (chronic kidney disease) stage III (HCC)    Benign prostatic hyperplasia without lower urinary tract symptoms    S/P aorta repair    Leukocytosis    Thrombocytopenia (HCC)    Postoperative anemia due to acute blood loss    Anticoagulation goal of INR 2 to 3      Plan:    Type 1 aortic dissection s/p hemiarch repair  Post op afib  Advanced acute renal failure  Back in atrial fibrillation  Rates around 100 bpm      Will try low dose metoprolol and see if he can tolerate it for rate control  Digoxin is contraindicated at this point due to advanced renal failure  Continue amiodarone  Last case scenario if this continues to be longer term problem with hypotension and limited medical management would be AVJ and PPM        Counseling / Coordination of Care  Total floor / unit time spent today 25 minutes  Greater than 50% of total time was spent with the patient and / or family counseling and / or coordination of care  A description of the counseling / coordination of care

## 2019-12-22 NOTE — PROGRESS NOTES
NEPHROLOGY PROGRESS NOTE   [address] SEBASTIAN Core 70 y o  male MRN: [id number]  Unit/Bed#: [facility] 406-01 Encounter: [id number]      ASSESSMENT & PLAN:  1  Acute kidney injury on top of chronic kidney disease stage 3  Secondary to postoperative ATN plus minus contrast nephropathy  Creatinine on admission 1 8 and gradually worsened, creatinine peak at 5 4 on 12/20  Serum creatinine down to 4 3 today  Patient today with nausea and vomiting, suspected secondary to uremic symptoms  With discussed with patient and with his daughter about possibility of dialysis  Patient would like to wait another day and see if his kidney continues to improve  Will continue with close monitoring, avoid hypotension  Monitor ins and outs  Follow labs in the morning  If his symptoms persist and if his kidney function does not improve further he may need to be started on dialysis in the next 24-48 hours    2  Chronic kidney disease stage 3 with baseline creatinine 1 4-1 8  3  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 status post repair on 12/15  4  Hemodynamics, patient remains with hypotension  Midodrine was started at 5 mg t i d   Consider increase dose to 10 mg t i d  Blood pressure does not improve  Continue with albumin boluses p r n  5  Anemia, monitor H&H and transfusion as needed  6  Hypokalemia, improved after replacement, follow labs    My plan and recommendation were discussed with cardiac surgery team    SUBJECTIVE:  Patient seen and examined early today, this is a late entry  He was feeling nauseated and having emesis episode, his daughter was at bedside  Denies any chest pain or shortness of breath  Back in AFib  Blood pressure remains low      OBJECTIVE:  Current Weight: Weight - Scale: 81 2 kg (179 lb 1 6 oz)  Vitals:    12/22/19 1045   BP: (!) 86/52   Pulse: 74   Resp: 18   Temp: 97 9 °F (36 6 °C)   SpO2: 99%       Intake/Output Summary (Last 24 hours) at 12/22/2019 1346  Last data filed at 12/22/2019 0101  Gross per 24 hour   Intake 480 ml   Output 900 ml   Net -420 ml     General: conscious, cooperative, in not acute distress  Eyes: conjunctivae pale, anicteric sclerae  ENT: lips and mucous membranes moist  Neck: supple, no JVD  Chest: clear breath sounds bilateral, no crackles, ronchus or wheezings  CVS:  Irregularly irregular  Abdomen: soft, non-tender, non-distended, normoactive bowel sounds  Extremities: no significant edema of both legs  Skin: no rash  Neuro: awake, alert, oriented          Medications:    Current Facility-Administered Medications:     acetaminophen (TYLENOL) tablet 650 mg, 650 mg, Oral, Q4H PRN, Josse Linder PA-C    amiodarone tablet 200 mg, 200 mg, Oral, Q8H [facility], Alex Linder PA-C, 200 mg at 12/22/19 0503    aspirin chewable tablet 81 mg, 81 mg, Oral, Daily, Sumaya Perry PA-C, 81 mg at 12/22/19 2366    atorvastatin (LIPITOR) tablet 80 mg, 80 mg, Oral, Daily With Yang Linder PA-C, 80 mg at 12/21/19 1559    bisacodyl (DULCOLAX) rectal suppository 10 mg, 10 mg, Rectal, Daily PRN, Josse Linder PA-C    docusate sodium (COLACE) capsule 100 mg, 100 mg, Oral, BID, Alex Linder PA-C, 100 mg at 12/22/19 4540    guaiFENesin (MUCINEX) 12 hr tablet 600 mg, 600 mg, Oral, Q12H [facility], Smuaya Perry PA-C, 600 mg at 12/22/19 0828    insulin lispro (HumaLOG) 100 units/mL subcutaneous injection 1-5 Units, 1-5 Units, Subcutaneous, TID AC, 1 Units at 12/20/19 1544 **AND** Fingerstick Glucose (POCT), , , TID AC, Alex Linder PA-C    insulin lispro (HumaLOG) 100 units/mL subcutaneous injection 1-5 Units, 1-5 Units, Subcutaneous, HS, Erick Reddy PA-C, 1 Units at 12/20/19 2121    metoprolol tartrate (LOPRESSOR) partial tablet 12 5 mg, 12 5 mg, Oral, Q12H [facility], Erika Mayo MD    midodrine (PROAMATINE) tablet 5 mg, 5 mg, Oral, TID AC, Sumaya Perry PA-C, 5 mg at 12/22/19 1239    ondansetron (ZOFRAN) injection 4 mg, 4 mg, Intravenous, Q6H PRN, Erick Reddy PA-C   oxyCODONE-acetaminophen (PERCOCET) 5-325 mg per tablet 1 tablet, 1 tablet, Oral, Q4H PRN, SUE Funes-C, 1 tablet at 12/22/19 0503    oxyCODONE-acetaminophen (PERCOCET) 5-325 mg per tablet 2 tablet, 2 tablet, Oral, Q6H PRN, Gayla Walsh PA-C, 2 tablet at 12/21/19 1559    pantoprazole (PROTONIX) EC tablet 40 mg, 40 mg, Oral, Daily, Cecelia Ast Kailash PA-C, 40 mg at 12/22/19 8231    polyethylene glycol (MIRALAX) packet 17 g, 17 g, Oral, Daily, Cecelia Ast Cross PA-C, 17 g at 12/22/19 3222    potassium chloride (K-DUR,KLOR-CON) CR tablet 40 mEq, 40 mEq, Oral, Daily, Sumaya Cardenasboemmanuel PA-C, 40 mEq at 12/22/19 8965    temazepam (RESTORIL) capsule 15 mg, 15 mg, Oral, HS PRN, SUE Funes-C    warfarin (COUMADIN) tablet 1 mg, 1 mg, Oral, Once (warfarin), Adia Free PA-C    Invasive Devices:   Urethral Catheter Non-latex; Temperature probe (Active)   Reasons to continue Urinary Catheter  Accurate I&O assessment in critically ill patients (48 hr  max) 12/20/2019 12:17 AM   Goal for Removal Remove after 48 hrs of I/O monitoring 12/20/2019  8:23 AM   Site Assessment Clean;Skin intact 12/20/2019 12:17 AM   Collection Container Standard drainage bag 12/20/2019 12:17 AM   Securement Method Securing device (Describe) 12/20/2019 12:17 AM   Output (mL) 575 mL 12/20/2019 12:17 AM       Lab Results:   Results from last 7 days   Lab Units 12/22/19  0543 12/21/19  0519 12/20/19  1504 12/20/19  0429  12/18/19  0506  12/17/19  0433  12/16/19  0356  12/15/19  1619  12/15/19  1458 12/15/19  1412   WBC Thousand/uL 16 09* 14 44*  --  12 22*   < >  --   --  19 72*  --  21 18*   < >  --   --   --   --    HEMOGLOBIN g/dL 11 9* 10 9*  --  9 8*   < >  --   --  10 3*  --  11 3*   < >  --    < >  --   --    I STAT HEMOGLOBIN g/dl  --   --   --   --   --   --   --   --   --   --   --  11 9*  --  10 5* 10 2*   HEMATOCRIT % 36 7 32 9*  --  29 8*   < >  --   --  31 5*  --  34 4*   < >  --    < >  --   --    HEMATOCRIT, ISTAT %  --   -- --   --   --   --   --   --   --   --   --  35*  --  31* 30*   PLATELETS Thousands/uL 216 136*  --  103*   < >  --   --  86*  --  147*  --   --    < >  --   --    SODIUM mmol/L 138 137  --  139   < >  --    < > 144   < > 147*  --   --    < >  --   --    POTASSIUM mmol/L 4 1 3 4* 4 1 3 0*   < >  --    < > 4 1   < > 4 1   < >  --    < >  --   --    CHLORIDE mmol/L 103 101  --  101   < >  --    < > 110*   < > 115*  --   --    < >  --   --    CO2 mmol/L 28 28  --  28   < >  --    < > 24   < > 23  --   --    < >  --   --    CO2, I-STAT mmol/L  --   --   --   --   --   --   --   --   --   --   --  24  --  24 26   BUN mg/dL 125* 129*  --  127*   < >  --    < > 71*   < > 44*  --   --    < >  --   --    CREATININE mg/dL 4 33* 4 38*  --  5 14*   < >  --    < > 3 97*   < > 2 97*  --   --    < >  --   --    CALCIUM mg/dL 8 1* 8 1*  --  7 6*   < >  --    < > 7 9*   < > 8 3  --   --    < >  --   --    MAGNESIUM mg/dL  --   --   --   --   --  2 8*  --  2 8*  --  2 8*  --   --   --   --   --    GLUCOSE, ISTAT mg/dl  --   --   --   --   --   --   --   --   --   --   --  95  --  104 131    < > = values in this interval not displayed  Portions of the record may have been created with voice recognition software  Occasional wrong word or "sound a like" substitutions may have occurred due to the inherent limitations of voice recognition software  Read the chart carefully and recognize, using context, where substitutions have occurred  If you have any questions, please contact the dictating provider

## 2019-12-22 NOTE — PLAN OF CARE
Problem: Potential for Falls  Goal: Patient will remain free of falls  Description  INTERVENTIONS:  - Assess patient frequently for physical needs  -  Identify cognitive and physical deficits and behaviors that affect risk of falls    -  [place] fall precautions as indicated by assessment   - Educate patient/family on patient safety including physical limitations  - Instruct patient to call for assistance with activity based on assessment  - Modify environment to reduce risk of injury  - Consider OT/PT consult to assist with strengthening/mobility  Outcome: Progressing     Problem: PAIN - ADULT  Goal: Verbalizes/displays adequate comfort level or baseline comfort level  Description  Interventions:  - Encourage patient to monitor pain and request assistance  - Assess pain using appropriate pain scale  - Administer analgesics based on type and severity of pain and evaluate response  - Implement non-pharmacological measures as appropriate and evaluate response  - Consider cultural and social influences on pain and pain management  - Notify physician/advanced practitioner if interventions unsuccessful or patient reports new pain  Outcome: Progressing     Problem: INFECTION - ADULT  Goal: Absence or prevention of progression during hospitalization  Description  INTERVENTIONS:  - Assess and monitor for signs and symptoms of infection  - Monitor lab/diagnostic results  - Monitor all insertion sites, i e  indwelling lines, tubes, and drains  - Monitor endotracheal if appropriate and nasal secretions for changes in amount and color  - [place] appropriate cooling/warming therapies per order  - Administer medications as ordered  - Instruct and encourage patient and family to use good hand hygiene technique  - Identify and instruct in appropriate isolation precautions for identified infection/condition  Outcome: Progressing     Problem: SAFETY ADULT  Goal: Maintain or return to baseline ADL function  Description  INTERVENTIONS:  -  Assess patient's ability to carry out ADLs; assess patient's baseline for ADL function and identify physical deficits which impact ability to perform ADLs (bathing, care of mouth/teeth, toileting, grooming, dressing, etc )  - Assess/evaluate cause of self-care deficits   - Assess range of motion  - Assess patient's mobility; develop plan if impaired  - Assess patient's need for assistive devices and provide as appropriate  - Encourage maximum independence but intervene and supervise when necessary  - Involve family in performance of ADLs  - Assess for home care needs following discharge   - Consider OT consult to assist with ADL evaluation and planning for discharge  - Provide patient education as appropriate  Outcome: Progressing  Goal: Maintain or return mobility status to optimal level  Description  INTERVENTIONS:  - Assess patient's baseline mobility status (ambulation, transfers, stairs, etc )    - Identify cognitive and physical deficits and behaviors that affect mobility  - Identify mobility aids required to assist with transfers and/or ambulation (gait belt, sit-to-stand, lift, walker, cane, etc )  - [place] fall precautions as indicated by assessment  - Record patient progress and toleration of activity level on Mobility SBAR; progress patient to next Phase/Stage  - Instruct patient to call for assistance with activity based on assessment  - Consider rehabilitation consult to assist with strengthening/weightbearing, etc   Outcome: Progressing     Problem: DISCHARGE PLANNING  Goal: Discharge to home or other facility with appropriate resources  Description  INTERVENTIONS:  - Identify barriers to discharge w/patient and caregiver  - Arrange for needed discharge resources and transportation as appropriate  - Identify discharge learning needs (meds, wound care, etc )  - Arrange for interpretive services to assist at discharge as needed  - Refer to Case Management Department for coordinating discharge planning if the patient needs post-hospital services based on physician/advanced practitioner order or complex needs related to functional status, cognitive ability, or social support system  Outcome: Progressing     Problem: Knowledge Deficit  Goal: Patient/family/caregiver demonstrates understanding of disease process, treatment plan, medications, and discharge instructions  Description  Complete learning assessment and assess knowledge base    Interventions:  - Provide teaching at level of understanding  - Provide teaching via preferred learning methods  Outcome: Progressing     Problem: NEUROSENSORY - ADULT  Goal: Achieves stable or improved neurological status  Description  INTERVENTIONS  - Monitor and report changes in neurological status  - Monitor vital signs such as temperature, blood pressure, glucose, and any other labs ordered   - Initiate measures to prevent increased intracranial pressure  - Monitor for seizure activity and implement precautions if appropriate      Outcome: Progressing  Goal: Remains free of injury related to seizures activity  Description  INTERVENTIONS  - Maintain airway, patient safety  and administer oxygen as ordered  - Monitor patient for seizure activity, document and report duration and description of seizure to physician/advanced practitioner  - If seizure occurs,  ensure patient safety during seizure  - Reorient patient post seizure  - Seizure pads on all 4 side rails  - Instruct patient/family to notify RN of any seizure activity including if an aura is experienced  - Instruct patient/family to call for assistance with activity based on nursing assessment  - Administer anti-seizure medications if ordered    Outcome: Progressing  Goal: Achieves maximal functionality and self care  Description  INTERVENTIONS  - Monitor swallowing and airway patency with patient fatigue and changes in neurological status  - Encourage and assist patient to increase activity and self care     - Encourage visually impaired, hearing impaired and aphasic patients to use assistive/communication devices  Outcome: Progressing     Problem: CARDIOVASCULAR - ADULT  Goal: Maintains optimal cardiac output and hemodynamic stability  Description  INTERVENTIONS:  - Monitor I/O, vital signs and rhythm  - Monitor for S/S and trends of decreased cardiac output  - Administer and titrate ordered vasoactive medications to optimize hemodynamic stability  - Assess quality of pulses, skin color and temperature  - Assess for signs of decreased coronary artery perfusion  - Instruct patient to report change in severity of symptoms  Outcome: Progressing  Goal: Absence of cardiac dysrhythmias or at baseline rhythm  Description  INTERVENTIONS:  - Continuous cardiac monitoring, vital signs, obtain 12 lead EKG if ordered  - Administer antiarrhythmic and heart rate control medications as ordered  - Monitor electrolytes and administer replacement therapy as ordered  Outcome: Progressing     Problem: RESPIRATORY - ADULT  Goal: Achieves optimal ventilation and oxygenation  Description  INTERVENTIONS:  - Assess for changes in respiratory status  - Assess for changes in mentation and behavior  - Position to facilitate oxygenation and minimize respiratory effort  - Oxygen administered by appropriate delivery if ordered  - Initiate smoking cessation education as indicated  - Encourage broncho-pulmonary hygiene including cough, deep breathe, Incentive Spirometry  - Assess the need for suctioning and aspirate as needed  - Assess and instruct to report SOB or any respiratory difficulty  - Respiratory Therapy support as indicated  Outcome: Progressing     Problem: Prexisting or High Potential for Compromised Skin Integrity  Goal: Skin integrity is maintained or improved  Description  INTERVENTIONS:  - Identify patients at risk for skin breakdown  - Assess and monitor skin integrity  - Assess and monitor nutrition and hydration status  - Monitor labs   - Assess for incontinence   - Turn and reposition patient  - Assist with mobility/ambulation  - Relieve pressure over bony prominences  - Avoid friction and shearing  - Provide appropriate hygiene as needed including keeping skin clean and dry  - Evaluate need for skin moisturizer/barrier cream  - Collaborate with interdisciplinary team   - Patient/family teaching  - Consider wound care consult   Outcome: Progressing

## 2019-12-22 NOTE — PROGRESS NOTES
Progress Note - Cardiothoracic Surgery   [facility] A Core 70 y o  male MRN: [id number]  Unit/Bed#: 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, Ascending aortic aneurysm     S/P Replacement of ascending aorta with aggressive hemiarch reconstruction and aortic valve resuspension with a 26 mm Dacron graft; POD # 7      24 Hour Events: Started on midodrine yesterday  In afib  On PO amio  Weaned off supplemental oxygen      Medications:   Scheduled Meds:    Current Facility-Administered Medications:  acetaminophen 650 mg Oral Q4H PRN Rubia Him, TRINY   amiodarone 200 mg Oral [facility] Alex Sullivan, TRINY   aspirin 81 mg Oral Daily Sumaya Perry PA-C   atorvastatin 80 mg Oral Daily With [employer] NEETU Linder, TRINY   bisacodyl 10 mg Rectal Daily PRN Christine Dillan Linder PA-C   docusate sodium 100 mg Oral BID Alexmaurice Linder, TRINY   insulin lispro 1-5 Units Subcutaneous TID AC Alex Linder, TRINY   insulin lispro 1-5 Units Subcutaneous HS Alexmaurice Linder, TRINY   midodrine 2 5 mg Oral TID AC Sumaya Perry PA-C   ondansetron 4 mg Intravenous Q6H PRN Rubia Him, TRINY   oxyCODONE-acetaminophen 1 tablet Oral Q4H PRN Rubia Him, TRINY   oxyCODONE-acetaminophen 2 tablet Oral Q6H PRN Christinemarielle Linder PA-C   pantoprazole 40 mg Oral Daily Alexmaurice Linder, TRINY   polyethylene glycol 17 g Oral Daily Alexmaurice Linder, TRINY   potassium chloride 40 mEq Oral Daily Sumaya Perry, TRINY   temazepam 15 mg Oral HS PRN Christine Dillan Linder PA-C     Continuous Infusions:     PRN Meds:   acetaminophen    bisacodyl    ondansetron    oxyCODONE-acetaminophen    oxyCODONE-acetaminophen    temazepam    Vitals:   Vitals:    12/21/19 1559 12/21/19 1900 12/21/19 2300 12/22/19 0457   BP: 130/76 109/67 98/54    BP Location: Left arm Left arm Left arm    Pulse: 76 77 78    Resp: 18 18 18    Temp: 98 1 °F (36 7 °C) 99 1 °F (37 3 °C) 97 6 °F (36 4 °C)    TempSrc: Oral Oral Oral    SpO2: 96% 98% 95%    Weight:    81 2 kg (179 lb 1 6 oz)   Height:           Telemetry: Afib; Heart Rate: 103    Respiratory:   SpO2: SpO2: 95 %; Room air    Intake/Output:     Intake/Output Summary (Last 24 hours) at 12/22/2019 0613  Last data filed at 12/22/2019 0101  Gross per 24 hour   Intake 740 ml   Output 1500 ml   Net -760 ml   UOP 1 5L/24hrs        Weights:   Weight (last 2 days)     Date/Time   Weight    12/22/19 0457   81 2 (179 1)    12/21/19 0600   81 2 (179 1)    12/20/19 0700   80 9 (178 35)            Admit weight: 79 4 kg    Results:   Results from last 7 days   Lab Units 12/22/19  0543 12/21/19 0519 12/20/19 0429   WBC Thousand/uL 16 09* 14 44* 12 22*   HEMOGLOBIN g/dL 11 9* 10 9* 9 8*   HEMATOCRIT % 36 7 32 9* 29 8*   PLATELETS Thousands/uL 216 136* 103*     Results from last 7 days   Lab Units 12/22/19  0543 12/21/19  0519 12/20/19  1504 12/20/19  0429  12/15/19  1619   SODIUM mmol/L 138 137  --  139   < >  --    POTASSIUM mmol/L 4 1 3 4* 4 1 3 0*   < >  --    CHLORIDE mmol/L 103 101  --  101   < >  --    CO2 mmol/L 28 28  --  28   < >  --    CO2, I-STAT mmol/L  --   --   --   --   --  24   BUN mg/dL 125* 129*  --  127*   < >  --    CREATININE mg/dL 4 33* 4 38*  --  5 14*   < >  --    GLUCOSE, ISTAT mg/dl  --   --   --   --   --  95   CALCIUM mg/dL 8 1* 8 1*  --  7 6*   < >  --     < > = values in this interval not displayed  Results from last 7 days   Lab Units 12/22/19  0544 12/21/19  0519 12/20/19  0429  12/15/19  1607   INR  2 75* 3 54* 2 72*   < > 1 75*   PTT seconds  --   --   --   --  41*    < > = values in this interval not displayed  12/21 Coumadin held  12/20 Coumadin 2 mg  12/19 Coumadin 5 mg  12/18 Coumadin 5 mg      Point of care glucose: 100-138    Studies:  No new studies    Invasive Lines/Tubes:  Invasive Devices     Peripheral Intravenous Line            Peripheral IV 12/19/19 Left;Ventral (anterior) Forearm 3 days                  Physical Exam:    HEENT/NECK:  PERRLA  No jugular venous distention      Cardiac: Irregularly irregular rate and rhythm and No murmurs/rubs/gallops  Pulmonary:  Crackles bilaterally  Abdomen:  Non-tender, Non-distended and Normal bowel sounds  Incisions: Sternum is stable  Incision is clean, dry, and intact  Extremities: Extremities warm/dry and No edema B/L  Neuro: Alert and oriented X 3, Sensation is grossly intact and No focal deficits  Skin: Warm/Dry, without rashes or lesions  Assessment:  Principal Problem:    Dissection of thoracic aorta (HCC)  Active Problems:    Benign essential hypertension    Abnormal TSH    Benign hypertension with CKD (chronic kidney disease) stage III (HCC)    Benign prostatic hyperplasia without lower urinary tract symptoms    S/P aorta repair    Leukocytosis    Thrombocytopenia (HCC)    Postoperative anemia due to acute blood loss    Anticoagulation goal of INR 2 to 3       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, Ascending aortic aneurysm     S/P Replacement of ascending aorta with aggressive hemiarch reconstruction and aortic valve resuspension with a 26 mm Dacron graft; POD # 7    Plan:    1  Cardiac:   NSR; Hypotensive  Continue PO amiodarone for post-op afib  Consider agent for rate control  Hold beta blocker for hypotension  Increase Midodrine to 5 mg PO TID  Continue ASA and Statin therapy  Epicardial pacing wires out  Central IV access has been discontinued  Continue DVT prophylaxis, INR 2 75, Coumadin 2 5 tonight      2  Pulmonary:   Good Room air oxygen saturation; Continue incentive spirometry/Coughing/Deep breathing exercises  Chest tubes have been discontinued    3  Renal:   Intake/Output net: -760 mL/24 hours  Hold diuresis today  Continue Potassium Chloride 40 mEq PO   Post op Creatinine elevated, Cr 4 33; Follow up labs prn  Nephrology following    4  Neuro:  Neurologically intact; No active issues  Incisional pain well-controlled; Continue prn Percocet    5   GI:  Tolerating TLC 2 3 gm sodium diet  Maintain 1800 mL daily fluid restriction   Continue stool softeners and prn suppository  Continue GI prophylaxis    6  Endo:   No history of diabetes; Glucose well-controlled with sliding scale coverage    7    Hematology:   Post-operative blood count acceptable; Trend prn    Leukocytosis, WBC 16 (14)   Thrombocytopenia resolved, plts 216 (136)    8  Disposition:      Anticipate discharge to home when medically ready     VTE Pharmacologic Prophylaxis: Warfarin (Coumadin)  VTE Mechanical Prophylaxis: sequential compression device    Collaborative rounds completed with NEETU Morrison    Plan of care discussed with bedside nurse    SIGNATURE: Papito Baker PA-C  DATE: December 22, 2019  TIME: 6:13 AM

## 2019-12-23 ENCOUNTER — APPOINTMENT (INPATIENT)
Dept: DIALYSIS | Facility: HOSPITAL | Age: 79
DRG: 219 | End: 2019-12-23
Payer: COMMERCIAL

## 2019-12-23 ENCOUNTER — APPOINTMENT (INPATIENT)
Dept: RADIOLOGY | Facility: HOSPITAL | Age: 79
DRG: 219 | End: 2019-12-23
Attending: INTERNAL MEDICINE
Payer: COMMERCIAL

## 2019-12-23 LAB
ANION GAP SERPL CALCULATED.3IONS-SCNC: 7 MMOL/L (ref 4–13)
BUN SERPL-MCNC: 142 MG/DL (ref 5–25)
CALCIUM SERPL-MCNC: 8.1 MG/DL (ref 8.3–10.1)
CHLORIDE SERPL-SCNC: 102 MMOL/L (ref 100–108)
CO2 SERPL-SCNC: 28 MMOL/L (ref 21–32)
CREAT SERPL-MCNC: 5.15 MG/DL (ref 0.6–1.3)
ERYTHROCYTE [DISTWIDTH] IN BLOOD BY AUTOMATED COUNT: 14.2 % (ref 11.6–15.1)
GFR SERPL CREATININE-BSD FRML MDRD: 10 ML/MIN/1.73SQ M
GLUCOSE SERPL-MCNC: 125 MG/DL (ref 65–140)
GLUCOSE SERPL-MCNC: 132 MG/DL (ref 65–140)
GLUCOSE SERPL-MCNC: 141 MG/DL (ref 65–140)
GLUCOSE SERPL-MCNC: 141 MG/DL (ref 65–140)
GLUCOSE SERPL-MCNC: 143 MG/DL (ref 65–140)
HBV CORE AB SER QL: NORMAL
HBV CORE IGM SER QL: NORMAL
HBV SURFACE AB SER-ACNC: <3.1 MIU/ML
HBV SURFACE AG SER QL: NORMAL
HCT VFR BLD AUTO: 35.1 % (ref 36.5–49.3)
HCT VFR BLD AUTO: 35.6 % (ref 36.5–49.3)
HCV AB SER QL: NORMAL
HGB BLD-MCNC: 11.6 G/DL (ref 12–17)
HGB BLD-MCNC: 11.6 G/DL (ref 12–17)
INR PPP: 2.78 (ref 0.84–1.19)
MAGNESIUM SERPL-MCNC: 3 MG/DL (ref 1.6–2.6)
MCH RBC QN AUTO: 30.1 PG (ref 26.8–34.3)
MCHC RBC AUTO-ENTMCNC: 32.6 G/DL (ref 31.4–37.4)
MCV RBC AUTO: 93 FL (ref 82–98)
PLATELET # BLD AUTO: 235 THOUSANDS/UL (ref 149–390)
PMV BLD AUTO: 10.7 FL (ref 8.9–12.7)
POTASSIUM SERPL-SCNC: 4.6 MMOL/L (ref 3.5–5.3)
PROTHROMBIN TIME: 28.8 SECONDS (ref 11.6–14.5)
RBC # BLD AUTO: 3.85 MILLION/UL (ref 3.88–5.62)
SODIUM SERPL-SCNC: 137 MMOL/L (ref 136–145)
WBC # BLD AUTO: 16.79 THOUSAND/UL (ref 4.31–10.16)

## 2019-12-23 PROCEDURE — 76937 US GUIDE VASCULAR ACCESS: CPT

## 2019-12-23 PROCEDURE — 85027 COMPLETE CBC AUTOMATED: CPT | Performed by: PHYSICIAN ASSISTANT

## 2019-12-23 PROCEDURE — 02H633Z INSERTION OF INFUSION DEVICE INTO RIGHT ATRIUM, PERCUTANEOUS APPROACH: ICD-10-PCS | Performed by: RADIOLOGY

## 2019-12-23 PROCEDURE — 99024 POSTOP FOLLOW-UP VISIT: CPT | Performed by: PHYSICIAN ASSISTANT

## 2019-12-23 PROCEDURE — 87340 HEPATITIS B SURFACE AG IA: CPT | Performed by: INTERNAL MEDICINE

## 2019-12-23 PROCEDURE — 86706 HEP B SURFACE ANTIBODY: CPT | Performed by: INTERNAL MEDICINE

## 2019-12-23 PROCEDURE — 90935 HEMODIALYSIS ONE EVALUATION: CPT | Performed by: INTERNAL MEDICINE

## 2019-12-23 PROCEDURE — 5A1D70Z PERFORMANCE OF URINARY FILTRATION, INTERMITTENT, LESS THAN 6 HOURS PER DAY: ICD-10-PCS | Performed by: INTERNAL MEDICINE

## 2019-12-23 PROCEDURE — C1894 INTRO/SHEATH, NON-LASER: HCPCS

## 2019-12-23 PROCEDURE — 86704 HEP B CORE ANTIBODY TOTAL: CPT | Performed by: INTERNAL MEDICINE

## 2019-12-23 PROCEDURE — C1752 CATH,HEMODIALYSIS,SHORT-TERM: HCPCS

## 2019-12-23 PROCEDURE — 82948 REAGENT STRIP/BLOOD GLUCOSE: CPT

## 2019-12-23 PROCEDURE — 85014 HEMATOCRIT: CPT | Performed by: PHYSICIAN ASSISTANT

## 2019-12-23 PROCEDURE — 86705 HEP B CORE ANTIBODY IGM: CPT | Performed by: INTERNAL MEDICINE

## 2019-12-23 PROCEDURE — 99024 POSTOP FOLLOW-UP VISIT: CPT | Performed by: RADIOLOGY

## 2019-12-23 PROCEDURE — 36556 INSERT NON-TUNNEL CV CATH: CPT | Performed by: RADIOLOGY

## 2019-12-23 PROCEDURE — 76937 US GUIDE VASCULAR ACCESS: CPT | Performed by: RADIOLOGY

## 2019-12-23 PROCEDURE — 99232 SBSQ HOSP IP/OBS MODERATE 35: CPT | Performed by: INTERNAL MEDICINE

## 2019-12-23 PROCEDURE — 86803 HEPATITIS C AB TEST: CPT | Performed by: INTERNAL MEDICINE

## 2019-12-23 PROCEDURE — NC001 PR NO CHARGE: Performed by: INTERNAL MEDICINE

## 2019-12-23 PROCEDURE — 85610 PROTHROMBIN TIME: CPT | Performed by: PHYSICIAN ASSISTANT

## 2019-12-23 PROCEDURE — 85018 HEMOGLOBIN: CPT | Performed by: PHYSICIAN ASSISTANT

## 2019-12-23 PROCEDURE — 77001 FLUOROGUIDE FOR VEIN DEVICE: CPT | Performed by: RADIOLOGY

## 2019-12-23 PROCEDURE — 80048 BASIC METABOLIC PNL TOTAL CA: CPT | Performed by: PHYSICIAN ASSISTANT

## 2019-12-23 PROCEDURE — 77001 FLUOROGUIDE FOR VEIN DEVICE: CPT

## 2019-12-23 PROCEDURE — 36556 INSERT NON-TUNNEL CV CATH: CPT

## 2019-12-23 PROCEDURE — 83735 ASSAY OF MAGNESIUM: CPT | Performed by: PHYSICIAN ASSISTANT

## 2019-12-23 RX ORDER — WARFARIN SODIUM 1 MG/1
2 TABLET ORAL
Status: COMPLETED | OUTPATIENT
Start: 2019-12-23 | End: 2019-12-24

## 2019-12-23 RX ADMIN — GUAIFENESIN 600 MG: 600 TABLET, EXTENDED RELEASE ORAL at 09:07

## 2019-12-23 RX ADMIN — DOCUSATE SODIUM 100 MG: 100 CAPSULE, LIQUID FILLED ORAL at 09:07

## 2019-12-23 RX ADMIN — ATORVASTATIN CALCIUM 80 MG: 80 TABLET, FILM COATED ORAL at 20:49

## 2019-12-23 RX ADMIN — ONDANSETRON 4 MG: 2 INJECTION INTRAMUSCULAR; INTRAVENOUS at 06:16

## 2019-12-23 RX ADMIN — MIDODRINE HYDROCHLORIDE 5 MG: 5 TABLET ORAL at 16:03

## 2019-12-23 RX ADMIN — MIDODRINE HYDROCHLORIDE 5 MG: 5 TABLET ORAL at 12:22

## 2019-12-23 RX ADMIN — MIDODRINE HYDROCHLORIDE 5 MG: 5 TABLET ORAL at 06:03

## 2019-12-23 RX ADMIN — METOPROLOL TARTRATE 12.5 MG: 25 TABLET ORAL at 20:50

## 2019-12-23 RX ADMIN — ASPIRIN 81 MG 81 MG: 81 TABLET ORAL at 09:07

## 2019-12-23 RX ADMIN — PANTOPRAZOLE SODIUM 40 MG: 40 TABLET, DELAYED RELEASE ORAL at 09:07

## 2019-12-23 RX ADMIN — AMIODARONE HYDROCHLORIDE 200 MG: 200 TABLET ORAL at 06:03

## 2019-12-23 RX ADMIN — METOPROLOL TARTRATE 12.5 MG: 25 TABLET ORAL at 09:07

## 2019-12-23 RX ADMIN — GUAIFENESIN 600 MG: 600 TABLET, EXTENDED RELEASE ORAL at 20:49

## 2019-12-23 RX ADMIN — AMIODARONE HYDROCHLORIDE 200 MG: 200 TABLET ORAL at 20:50

## 2019-12-23 NOTE — SEDATION DOCUMENTATION
Right IJ vein temporary HD catheter placed by Dr Elyssa Wyatt without complications  VSS  Ok to use HD catheter as per Dr Elyssa Wyatt  Discharge to inpatient HD

## 2019-12-23 NOTE — PHYSICAL THERAPY NOTE
Physical Therapy Cancellation Note      Chart reviewed; attempted to see pt x 2 in PM; initially, pt is in the BR; later, observed in the chair w/ family at bedside; family indicates pt was not feeling well and pt himself declines mobilization when asked; RN informed; will follow      Joshua Apgar, PT

## 2019-12-23 NOTE — PROGRESS NOTES
NEPHROLOGY PROGRESS NOTE   Oliver Sharpe 70 y o  male MRN: [id number]  Unit/Bed#: [facility] 406-01 Encounter: [id number]  Reason for Consult: DAMON/CKD    ASSESSMENT AND PLAN:  Damon on CKD stage 3, baseline creatinine 1 4 to 1 8  -DAMON suspected to be secondary to postop ATN/contrast nephropathy  -creatinine on admission 1 8 now seems to be progressively worsening and somewhat fluctuating up to 5 1 today   -patient has significant uremic symptoms including nausea, occasional vomiting, poor appetite, overall sleepy/lethargic with significantly worsening azotemia along with worsening renal failure  I had detailed discussion with patient and his daughter was present at bedside regarding need for dialysis and patient verbalized understanding of my discussion with him  He has provided dialysis consent which is in the chart   -consult IR for temporary HD catheter placement and planning to do 1st session of dialysis 2 hours  -will do lower blood flow to avoid dialysis disequilibrium syndrome  -BMP tomorrow a m , will re-evaluate again tomorrow for 2nd session of dialysis  -continue to monitor intake and output  -CT scan this month showed atrophic right kidney, scattered areas of cortical thinning/scanning in the left kidney  -prior renal ultrasound in 2017 shows right kidney atrophy 7 8 cm, left kidney 9 7 cm with normal echogenicity  CKD anemia, hemoglobin to closely monitor, transfuse p r n  For hemoglobin less than seven    Blood pressure, BP remains on Lower normal range, currently remains on midodrine 5 mg t i d  Type a aortic dissection with intramural hematoma, 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  Close CT surgery follow-up  Discussed above plan in detail with CT surgery team    SUBJECTIVE:  Patient seen and examined at bedside  Remains overall sleepy at the time my encounter although opens eyes and answering questions appropriately    Has been complaining of nausea, poor appetite, feeling tired     OBJECTIVE:  Current Weight: Weight - Scale: 81 1 kg (178 lb 12 7 oz)  Vitals:    12/23/19 0707   BP: 114/60   Pulse: 70   Resp: 18   Temp: (!) 97 4 °F (36 3 °C)   SpO2: 92%       Intake/Output Summary (Last 24 hours) at 12/23/2019 1045  Last data filed at 12/23/2019 0528  Gross per 24 hour   Intake 400 ml   Output 650 ml   Net -250 ml     Wt Readings from Last 3 Encounters:   12/23/19 81 1 kg (178 lb 12 7 oz)   04/24/19 84 5 kg (186 lb 4 6 oz)   04/23/19 87 7 kg (193 lb 6 4 oz)     Temp Readings from Last 3 Encounters:   12/23/19 (!) 97 4 °F (36 3 °C) (Oral)   04/25/19 97 9 °F (36 6 °C) (Oral)   04/23/19 (!) 97 °F (36 1 °C)     BP Readings from Last 3 Encounters:   12/23/19 114/60   04/25/19 137/73   04/23/19 138/92     Pulse Readings from Last 3 Encounters:   12/23/19 70   04/25/19 81   04/23/19 (!) 46        Physical Examination:  General:  Sitting in chair, no acute distress   Eyes:  Mild conjunctival pallor present  ENT:  External examination of ears and nose unremarkable  Neck:  No obvious lymphadenopathy appreciated  Respiratory:  Bilateral air entry present  CVS:  S1, S2 present  GI:  Soft, nontender, nondistended  CNS:  Sleepy, opens eyes, oriented x3, answers questions appropriately although unable to maintain eye contact  Extremities:  No significant edema in legs  Skin:  No new rash in legs    Medications:    Current Facility-Administered Medications:     acetaminophen (TYLENOL) tablet 650 mg, 650 mg, Oral, Q4H PRN, Leann Linder PA-C    amiodarone tablet 200 mg, 200 mg, Oral, Q8H [address], Alex Linder PA-C, 200 mg at 12/23/19 0603    aspirin chewable tablet 81 mg, 81 mg, Oral, Daily, Sumaya Perry PA-C, 81 mg at 12/23/19 0907    atorvastatin (LIPITOR) tablet 80 mg, 80 mg, Oral, Daily With Desmond Linder PA-C, 80 mg at 12/22/19 1731    bisacodyl (DULCOLAX) rectal suppository 10 mg, 10 mg, Rectal, Daily PRN, Leann Linder PA-C    docusate sodium (COLACE) capsule 100 mg, 100 mg, Oral, BID, Ishaan Linder PA-C, 100 mg at 12/23/19 3208    guaiFENesin (MUCINEX) 12 hr tablet 600 mg, 600 mg, Oral, Q12H [address], Sumaya Perry PA-C, 600 mg at 12/23/19 0907    insulin lispro (HumaLOG) 100 units/mL subcutaneous injection 1-5 Units, 1-5 Units, Subcutaneous, TID AC, 1 Units at 12/20/19 1544 **AND** Fingerstick Glucose (POCT), , , TID AC, Alex Linder PA-C    insulin lispro (HumaLOG) 100 units/mL subcutaneous injection 1-5 Units, 1-5 Units, Subcutaneous, HS, Patricia Gordon PA-C, 1 Units at 12/20/19 2121    metoprolol tartrate (LOPRESSOR) partial tablet 12 5 mg, 12 5 mg, Oral, Q12H [address], Jonas Hinkle MD, 12 5 mg at 12/23/19 0907    midodrine (PROAMATINE) tablet 5 mg, 5 mg, Oral, TID AC, Sumaya Perry PA-C, 5 mg at 12/23/19 0603    ondansetron (ZOFRAN) injection 4 mg, 4 mg, Intravenous, Q6H PRN, Ishaan Linder PA-C, 4 mg at 12/23/19 6721    oxyCODONE-acetaminophen (PERCOCET) 5-325 mg per tablet 1 tablet, 1 tablet, Oral, Q4H PRN, Patricia Gordon PA-C, 1 tablet at 12/22/19 0503    oxyCODONE-acetaminophen (PERCOCET) 5-325 mg per tablet 2 tablet, 2 tablet, Oral, Q6H PRN, Patricia Gordon PA-C, 2 tablet at 12/21/19 1559    pantoprazole (PROTONIX) EC tablet 40 mg, 40 mg, Oral, Daily, Ishaan Linder PA-C, 40 mg at 12/23/19 0907    polyethylene glycol (MIRALAX) packet 17 g, 17 g, Oral, Daily, Ishaan Linder PA-C, 17 g at 12/22/19 9979    potassium chloride (K-DUR,KLOR-CON) CR tablet 40 mEq, 40 mEq, Oral, Daily, Sumaya Perry PA-C, 40 mEq at 12/23/19 2092    temazepam (RESTORIL) capsule 15 mg, 15 mg, Oral, HS PRN, Patricia Gordon PA-C    Laboratory Results:  Results from last 7 days   Lab Units 12/23/19  0448 12/22/19  0543 12/21/19  0519 12/20/19  1504 12/20/19  0429 12/19/19  0501 12/18/19  1806 12/18/19  0646 12/18/19  0506 12/18/19  0358  12/17/19  0433   WBC Thousand/uL 16 79* 16 09* 14 44*  --  12 22* 13 95*  --  17 15*  --   --   --  19 72*   HEMOGLOBIN g/dL 11 6* 11 9* 10 9*  --  9 8* 10 4* --  10 5*  --   --   --  10 3*   HEMATOCRIT % 35 6* 36 7 32 9*  --  29 8* 31 5*  --  31 9*  --   --   --  31 5*   PLATELETS Thousands/uL 235 216 136*  --  103* 111*  --  104*  --   --   --  86*   SODIUM mmol/L 137 138 137  --  139 141 142  --   --  142   < > 144   POTASSIUM mmol/L 4 6 4 1 3 4* 4 1 3 0* 3 2* 4 0  --   --  3 6   < > 4 1   CHLORIDE mmol/L 102 103 101  --  101 101 104  --   --  106   < > 110*   CO2 mmol/L 28 28 28  --  28 28 27  --   --  24   < > 24   BUN mg/dL 142* 125* 129*  --  127* 124* 126*  --   --  103*   < > 71*   CREATININE mg/dL 5 15* 4 33* 4 38*  --  5 14* 5 10* 5 13*  --   --  4 55*   < > 3 97*   CALCIUM mg/dL 8 1* 8 1* 8 1*  --  7 6* 8 0* 8 1*  --   --  8 1*   < > 7 9*   MAGNESIUM mg/dL 3 0*  --   --   --   --   --   --   --  2 8*  --   --  2 8*    < > = values in this interval not displayed  XR chest pa & lateral   Final Result by Malathi Faith MD (12/20 1856)   Persistent small left basilar pleural effusion with lungs otherwise clear  Workstation performed: [id number]         XR chest portable   Final Result by Leydi Byrd MD (46/89 3310)      Extubation  Mild bibasilar atelectasis  Expected appearance after cardiac surgery  Workstation performed: [id number]         XR chest portable ICU   Final Result by Arnold Coulter MD (75/47 [id number])      1  Status post sternotomy  Lines and support devices appear in position as noted above  Central fullness of the pulmonary vasculature may represent mild pulmonary vascular congestion  Workstation performed: [id number]         CTA dissection protocol chest abdomen pelvis w wo contrast   Final Result by Maryse Mejia MD (12/14 1630)      1  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 tapering to 4 1 cm at the aortic arch     2   There is crescentic intermediate density along the aortic wall extending from the aortic root through the aortic arch and along the proximal descending thoracic aorta compatible with acute type A intramural hematoma  Emergent vascular consultation    is recommended  I personally discussed this study with Anny Barrios on 12/14/2019 at 10:57 PM                   Workstation performed: [id number]         XR chest 2 views   Final Result by Lissy Begum MD (12/15 4868)      Cardiomegaly   No acute consolidation or congestion            Workstation performed: [id number]             Portions of the record may have been created with voice recognition software  Occasional wrong word or "sound a like" substitutions may have occurred due to the inherent limitations of voice recognition software  Read the chart carefully and recognize, using context, where substitutions have occurred

## 2019-12-23 NOTE — PROCEDURES
Dialysis note:    I saw and examined patient during 1st hemodialysis treatment on 12/23/2019  The patient was receiving hemodialysis for treatment of AFSHIN  I also reviewed vital signs, intake and output, lab results and recent events, and agree with dialysis order  Tolerating HD   BP acceptable

## 2019-12-23 NOTE — PLAN OF CARE
Pt first hemodialysis and will only have 2 hours of treatment as ordered with no fluid removal  Serum K-4 6 and on 2K bath  Completed 2 hours of hemodialysis  and was able to tolerate treatment  Unable to weigh pt because he was on stretcher  Temporary cath via right IJ incision site continue to be bleeding  Dr Chanda Smith aware and seen pt  Cameron Vásquez RN receiving nurse is aware    Problem: METABOLIC, FLUID AND ELECTROLYTES - ADULT  Goal: Electrolytes maintained within normal limits  Description  INTERVENTIONS:  - Monitor labs and assess patient for signs and symptoms of electrolyte imbalances  - Administer electrolyte replacement as ordered  - Monitor response to electrolyte replacements, including repeat lab results as appropriate  - Instruct patient on fluid and nutrition as appropriate  Outcome: Progressing  Goal: Fluid balance maintained  Description  INTERVENTIONS:  - Monitor labs   - Monitor I/O and WT  - Instruct patient on fluid and nutrition as appropriate  - Assess for signs & symptoms of volume excess or deficit  Outcome: Progressing

## 2019-12-23 NOTE — PROGRESS NOTES
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, Ascending aortic aneurysm     S/P Replacement of ascending aorta with aggressive hemiarch reconstruction and aortic valve resuspension with a 26 mm Dacron graft; POD # 8      24 Hour Events: NSR today  Started on low dose metoprolol yesterday for afib with HR 100s      Medications:   Scheduled Meds:    Current Facility-Administered Medications:  acetaminophen 650 mg Oral Q4H PRN Dudley Mayers PA-C   amiodarone 200 mg Oral [facility]   aspirin 81 mg Oral Daily Sumaya Perry PA-C   atorvastatin 80 mg Oral Daily With [employer] NEETU Linder PA-C   bisacodyl 10 mg Rectal Daily PRN [place] Mec CrossTRINY   docusate sodium 100 mg Oral BID Alexmaurice Linder PA-C   guaiFENesin 600 mg Oral Q12H [address] Sumaya Perry PA-C   insulin lispro 1-5 Units Subcutaneous TID AC Alex Linder PA-C   insulin lispro 1-5 Units Subcutaneous HS Alex Linder PA-C   metoprolol tartrate 12 5 mg Oral Q12H [address] Salas Webb MD   midodrine 5 mg Oral TID AC Sumaya Perry PA-C   ondansetron 4 mg Intravenous Q6H PRN Dudley NeedTRINY bianchi   oxyCODONE-acetaminophen 1 tablet Oral Q4H PRN Mac NeedyTRINY   oxyCODONE-acetaminophen 2 tablet Oral Q6H PRN Virginia Mec CrossTRINY   pantoprazole 40 mg Oral Daily Alex Linder PA-C   polyethylene glycol 17 g Oral Daily Alexmaurice Linder PA-C   potassium chloride 40 mEq Oral Daily Sumaya Perry PA-C   temazepam 15 mg Oral HS PRN [facility] TRINY Linder     Continuous Infusions:     PRN Meds:   acetaminophen    bisacodyl    ondansetron    oxyCODONE-acetaminophen    oxyCODONE-acetaminophen    temazepam    Vitals:   Vitals:    12/22/19 2253 12/23/19 0249 12/23/19 0600 12/23/19 0707   BP: 116/55 101/59  114/60   BP Location: Left arm Left arm  Left arm   Pulse: 82 66  70   Resp: 18 18  18   Temp: 97 5 °F (36 4 °C) 97 9 °F (36 6 °C)  (!) 97 4 °F (36 3 °C)   TempSrc: Oral Oral  Oral   SpO2: 92% 94%  92%   Weight:   81 1 kg (178 lb 12 7 oz)    Height:           Telemetry: NSR; Heart Rate: 72    Respiratory:   SpO2: SpO2: 92 %; Room air    Intake/Output:     Intake/Output Summary (Last 24 hours) at 12/23/2019 0744  Last data filed at 12/23/2019 0528  Gross per 24 hour   Intake 400 ml   Output 650 ml   Net -250 ml    mL/24hrs and 2x unmeasured UOP      Weights:   Weight (last 2 days)     Date/Time   Weight    12/23/19 0600   81 1 (178 79)    12/22/19 0457   81 2 (179 1)    12/21/19 0600   81 2 (179 1)            Admit weight: 79 4 kg    Results:   Results from last 7 days   Lab Units 12/23/19  0448 12/22/19  0543 12/21/19  0519   WBC Thousand/uL 16 79* 16 09* 14 44*   HEMOGLOBIN g/dL 11 6* 11 9* 10 9*   HEMATOCRIT % 35 6* 36 7 32 9*   PLATELETS Thousands/uL 235 216 136*     Results from last 7 days   Lab Units 12/23/19  0448 12/22/19  0543 12/21/19  0519   SODIUM mmol/L 137 138 137   POTASSIUM mmol/L 4 6 4 1 3 4*   CHLORIDE mmol/L 102 103 101   CO2 mmol/L 28 28 28   BUN mg/dL 142* 125* 129*   CREATININE mg/dL 5 15* 4 33* 4 38*   CALCIUM mg/dL 8 1* 8 1* 8 1*     Results from last 7 days   Lab Units 12/23/19  0448 12/22/19  0544 12/21/19  0519   INR  2 78* 2 75* 3 54*   12/22 Coumadin 1 mg  12/21 Coumadin held  12/20 Coumadin 2 mg  12/19 Coumadin 5 mg  12/18 Coumadin 5 mg      Point of care glucose: 100-138    Studies:  No new studies    Invasive Lines/Tubes:  Invasive Devices     Peripheral Intravenous Line            Peripheral IV 12/19/19 Left;Ventral (anterior) Forearm 4 days                  Physical Exam:    HEENT/NECK:  PERRLA  No jugular venous distention  Cardiac: Regular rate and rhythm and No murmurs/rubs/gallops  Pulmonary:  Crackles bilaterally  Abdomen:  Non-tender, Non-distended and Normal bowel sounds  Incisions: Sternum is stable  Incision is clean, dry, and intact    and Saphenectomy incison is clean, dry, and intact  Extremities: Extremities warm/dry and Trace edema B/L  Neuro: Alert and oriented X 3, Sensation is grossly intact and No focal deficits  Skin: Warm/Dry, without rashes or lesions  Assessment:  Principal Problem:    Dissection of thoracic aorta (HCC)  Active Problems:    Benign essential hypertension    Abnormal TSH    Benign hypertension with CKD (chronic kidney disease) stage III (HCC)    Benign prostatic hyperplasia without lower urinary tract symptoms    S/P aorta repair    Leukocytosis    Thrombocytopenia (HCC)    Postoperative anemia due to acute blood loss    Anticoagulation goal of INR 2 to 3       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, Ascending aortic aneurysm     S/P Replacement of ascending aorta with aggressive hemiarch reconstruction and aortic valve resuspension with a 26 mm Dacron graft; POD # 8    Plan:    1  Cardiac:   NSR; Hypotensive  Continue PO amiodarone for post-op afib  Lopressor 12 5 mg PO BID  Midodrine to 5 mg PO TID  Continue ASA and Statin therapy  Epicardial pacing wires out  Central IV access has been discontinued  Continue DVT prophylaxis, INR 2 78, Coumadin 2 mg tonight      2  Pulmonary:   Good Room air oxygen saturation; Continue incentive spirometry/Coughing/Deep breathing exercises  Chest tubes have been discontinued    3  Renal:   Intake/Output net: -250 mL/24 hours (inaccurate I/Os due to unmeasured UOP)  Continue Potassium Chloride 40 mEq PO   Post op Creatinine elevated, Cr 5 15; Follow up labs prn  Nephrology following, consider dialysis today    4  Neuro:  Neurologically intact; No active issues  Incisional pain well-controlled; Continue prn Percocet    5  GI:  Tolerating TLC 2 3 gm sodium diet  Maintain 1800 mL daily fluid restriction   Continue stool softeners and prn suppository  Continue GI prophylaxis    6   Endo:   No history of diabetes; Glucose well-controlled with sliding scale coverage    7    Hematology:   Post-operative blood count acceptable; Trend prn    Leukocytosis, WBC 16   Thrombocytopenia resolved    8  Disposition:      Anticipate discharge to home when medically ready     VTE Pharmacologic Prophylaxis: Warfarin (Coumadin)  VTE Mechanical Prophylaxis: sequential compression device    Collaborative rounds completed with NEETU [place]    Plan of care discussed with bedside nurse    SIGNATURE: Flori Melara PA-C  DATE: December 23, 2019  TIME: 7:44 AM

## 2019-12-23 NOTE — PROGRESS NOTES
Progress Note - Cardiology   [facility] A Core 70 y o  male MRN: [id number]  Unit/Bed#: [facility] 406-01 Encounter: [id number]    Assessment:  Principal Problem:    Dissection of thoracic aorta (HCC)  Active Problems:    Benign essential hypertension    Abnormal TSH    Benign hypertension with CKD (chronic kidney disease) stage III (HCC)    Benign prostatic hyperplasia without lower urinary tract symptoms    S/P aorta repair    Leukocytosis    Thrombocytopenia (HCC)    Postoperative anemia due to acute blood loss    Anticoagulation goal of INR 2 to 3  Type A dissection, s/p repair  Resuspension of aortic valve  PAF, currently converted back to sinus rhythm  AFSHIN will require dialysis  Hypotension, on midodrine  Plan:    Continue current rhythm control medications  Anticoagulated with warfarin  To start HD  Monitor on tele  Subjective/Objective   Subjective:   Still nauseous  Back in sinus rhythm  To start dialysis  Objective:    Vitals: /57   Pulse 60   Temp 97 7 °F (36 5 °C) (Oral)   Resp 16   Ht 5' 9" (1 753 m)   Wt 81 1 kg (178 lb 12 7 oz)   SpO2 93%   BMI 26 40 kg/m²   Vitals:    12/22/19 0457 12/23/19 0600   Weight: 81 2 kg (179 lb 1 6 oz) 81 1 kg (178 lb 12 7 oz)     Orthostatic Blood Pressures      Most Recent Value   Blood Pressure  119/57 filed at 12/23/2019 1503   Patient Position - Orthostatic VS  Sitting filed at 12/23/2019 1047            Intake/Output Summary (Last 24 hours) at 12/23/2019 1506  Last data filed at 12/23/2019 0528  Gross per 24 hour   Intake 400 ml   Output 650 ml   Net -250 ml     Physical Exam:   General appearance: Sitting in recliner  Neck: Dressing R IJ  Lungs: Decreased air entry  Heart: Regular  No murmur  Chest: Midline scar well healing  Abdomen: Appropriate post op  Extremities: Venodynes on  Pulses: Equal bilaterally    Skin: Skin color, texture, turgor normal  No rashes or lesions  Neurologic: Grossly normal  Alert and oriented      Medications:    Current Facility-Administered Medications:     acetaminophen (TYLENOL) tablet 650 mg, 650 mg, Oral, Q4H PRN, Tayler Mccann PA-C    amiodarone tablet 200 mg, 200 mg, Oral, Q8H [facility], Ever Linder PA-C, 200 mg at 12/23/19 0603    aspirin chewable tablet 81 mg, 81 mg, Oral, Daily, Sumaya Perry PA-C, 81 mg at 12/23/19 0907    atorvastatin (LIPITOR) tablet 80 mg, 80 mg, Oral, Daily With Madeline Quinonez PA-C, 80 mg at 12/22/19 1731    bisacodyl (DULCOLAX) rectal suppository 10 mg, 10 mg, Rectal, Daily PRN, Ever Linder PA-C    docusate sodium (COLACE) capsule 100 mg, 100 mg, Oral, BID, Alex Linder PA-C, 100 mg at 12/23/19 0907    guaiFENesin (MUCINEX) 12 hr tablet 600 mg, 600 mg, Oral, Q12H [facility], Sumaya Perry PA-C, 600 mg at 12/23/19 0907    insulin lispro (HumaLOG) 100 units/mL subcutaneous injection 1-5 Units, 1-5 Units, Subcutaneous, TID AC, 1 Units at 12/20/19 1544 **AND** Fingerstick Glucose (POCT), , , TID AC, Alex Linder PA-C    insulin lispro (HumaLOG) 100 units/mL subcutaneous injection 1-5 Units, 1-5 Units, Subcutaneous, HS, Tayler Mccann PA-C, 1 Units at 12/20/19 2121    metoprolol tartrate (LOPRESSOR) partial tablet 12 5 mg, 12 5 mg, Oral, Q12H [facility], Shawna Hawthorne MD, 12 5 mg at 12/23/19 0907    midodrine (PROAMATINE) tablet 5 mg, 5 mg, Oral, TID AC, Sumaya Perry PA-C, 5 mg at 12/23/19 1222    ondansetron (ZOFRAN) injection 4 mg, 4 mg, Intravenous, Q6H PRN, Ever Linder PA-C, 4 mg at 12/23/19 0616    oxyCODONE-acetaminophen (PERCOCET) 5-325 mg per tablet 1 tablet, 1 tablet, Oral, Q4H PRN, Tayler Mccann PA-C, 1 tablet at 12/22/19 0503    oxyCODONE-acetaminophen (PERCOCET) 5-325 mg per tablet 2 tablet, 2 tablet, Oral, Q6H PRN, Tayler Mccann PA-C, 2 tablet at 12/21/19 1559    pantoprazole (PROTONIX) EC tablet 40 mg, 40 mg, Oral, Daily, Ever Linder PA-C, 40 mg at 12/23/19 0907    polyethylene glycol (MIRALAX) packet 17 g, 17 g, Oral, Daily, Eugene Valentine PA-C, 17 g at 12/22/19 9340    temazepam (RESTORIL) capsule 15 mg, 15 mg, Oral, HS PRN, Eugene Valentine PA-C    warfarin (COUMADIN) tablet 2 mg, 2 mg, Oral, Once (warfarin), Papito Baker PA-C    Lab Results:      Results from last 7 days   Lab Units 12/23/19  0448 12/22/19  0543 12/21/19  0519   WBC Thousand/uL 16 79* 16 09* 14 44*   HEMOGLOBIN g/dL 11 6* 11 9* 10 9*   HEMATOCRIT % 35 6* 36 7 32 9*   PLATELETS Thousands/uL 235 216 136*         Results from last 7 days   Lab Units 12/23/19  0448 12/22/19  0543 12/21/19  0519   POTASSIUM mmol/L 4 6 4 1 3 4*   CHLORIDE mmol/L 102 103 101   CO2 mmol/L 28 28 28   BUN mg/dL 142* 125* 129*   CREATININE mg/dL 5 15* 4 33* 4 38*   CALCIUM mg/dL 8 1* 8 1* 8 1*     Results from last 7 days   Lab Units 12/23/19  0448 12/22/19  0544 12/21/19  0519   INR  2 78* 2 75* 3 54*     Results from last 7 days   Lab Units 12/23/19  0448 12/18/19  0506 12/17/19  0433   MAGNESIUM mg/dL 3 0* 2 8* 2 8*       Telemetry: Personally reviewed  Back in sinus rhythm, no conversion pauses  DYLAN 2/15/19:  LEFT VENTRICLE:  Systolic Function: normal  Ejection Fraction: 65%  Cavity size: normal    RIGHT VENTRICLE:  Systolic Function: normal   Cavity size moderately dilated  Hypertrophy (severe LVH) present  AORTIC VALVE:  Leaflets: normal and trileaflet  Leaflet motions normal and normal  Stenosis: none  Regurgitation: trace  MITRAL VALVE:  Leaflets: calcified and normal  Leaflet Motions: normal  Regurgitation: none  Stenosis: none  TRICUSPID VALVE:  Leaflets: normal, annulus severely dilated and dilate annulus  Leaflet Motions: restricted  Stenosis: none  Regurgitation: moderate  PULMONIC VALVE:  Leaflets: normal  Regurgitation: none  Stenosis: none  ASCENDING AORTA:  Size:  normal  Dissection present  AORTIC ARCH:  Size:  normal    OTHER AORTIC FINDINGS:   Hematoma noted that extends from distal arch to mid descending aorta, 0 7 cm in thickness    RIGHT ATRIUM:  Size: dilated  No spontaneous echo contrast   LEFT ATRIUM:  Size: normal    LEFT ATRIAL APPENDAGE:  Size: normal    ATRIAL SEPTUM:  Intra-atrial septal morphology: normal     VENTRICULAR SEPTUM:  Intra-ventricular septum morphology: normal    OTHER FINDINGS:  Pericardium:  normal  Pleural Effusion:  none  POSTPROCEDURE  LEFT VENTRICLE: Unchanged   RIGHT VENTRICLE:   Systolic Function: mildly depressed  AORTIC VALVE:   Leaflets: native  Stenosis: none  Regurgitation: mild  MITRAL VALVE: Unchanged   TRICUSPID VALVE:   Leaflets: native  Regurgitation: systolic flow reversal in hepatic vein and severe  PULMONIC VALVE: Unchanged   ATRIA: Unchanged   AORTA:   OTHER AORTA FINDINGS: The proximal aorta has been replaced  The mural hematoma on the descending aorta is preserved  Amanda Mittal REMOVAL:  Probe Removal: atraumatic  ECHOCARDIOGRAM COMMENTS:  Noted is the absence of right coronary ostia  The left coronary ostia appears to have two vessels arising from it  Confirmed surgically  Amanda Mittal

## 2019-12-24 ENCOUNTER — APPOINTMENT (INPATIENT)
Dept: DIALYSIS | Facility: HOSPITAL | Age: 79
DRG: 219 | End: 2019-12-24
Attending: INTERNAL MEDICINE
Payer: COMMERCIAL

## 2019-12-24 ENCOUNTER — APPOINTMENT (INPATIENT)
Dept: RADIOLOGY | Facility: HOSPITAL | Age: 79
DRG: 219 | End: 2019-12-24
Payer: COMMERCIAL

## 2019-12-24 LAB
ANION GAP SERPL CALCULATED.3IONS-SCNC: 10 MMOL/L (ref 4–13)
BACTERIA UR QL AUTO: ABNORMAL /HPF
BILIRUB UR QL STRIP: NEGATIVE
BUN SERPL-MCNC: 117 MG/DL (ref 5–25)
CALCIUM SERPL-MCNC: 8 MG/DL (ref 8.3–10.1)
CHLORIDE SERPL-SCNC: 100 MMOL/L (ref 100–108)
CLARITY UR: ABNORMAL
CO2 SERPL-SCNC: 27 MMOL/L (ref 21–32)
COLOR UR: ABNORMAL
CREAT SERPL-MCNC: 4.92 MG/DL (ref 0.6–1.3)
ERYTHROCYTE [DISTWIDTH] IN BLOOD BY AUTOMATED COUNT: 14.3 % (ref 11.6–15.1)
GFR SERPL CREATININE-BSD FRML MDRD: 11 ML/MIN/1.73SQ M
GLUCOSE FLD-MCNC: 98 MG/DL
GLUCOSE SERPL-MCNC: 117 MG/DL (ref 65–140)
GLUCOSE SERPL-MCNC: 125 MG/DL (ref 65–140)
GLUCOSE SERPL-MCNC: 136 MG/DL (ref 65–140)
GLUCOSE SERPL-MCNC: 158 MG/DL (ref 65–140)
GLUCOSE SERPL-MCNC: 198 MG/DL (ref 65–140)
GLUCOSE UR STRIP-MCNC: NEGATIVE MG/DL
HCT VFR BLD AUTO: 36.2 % (ref 36.5–49.3)
HGB BLD-MCNC: 11.7 G/DL (ref 12–17)
HGB UR QL STRIP.AUTO: NEGATIVE
HYALINE CASTS #/AREA URNS LPF: ABNORMAL /LPF
INR PPP: 2.92 (ref 0.84–1.19)
KETONES UR STRIP-MCNC: NEGATIVE MG/DL
LDH FLD L TO P-CCNC: 1554 U/L
LEUKOCYTE ESTERASE UR QL STRIP: ABNORMAL
LYMPHOCYTES NFR BLD AUTO: 3 %
MCH RBC QN AUTO: 29.9 PG (ref 26.8–34.3)
MCHC RBC AUTO-ENTMCNC: 32.3 G/DL (ref 31.4–37.4)
MCV RBC AUTO: 93 FL (ref 82–98)
MONOCYTES NFR BLD AUTO: 17 %
NEUTS SEG NFR BLD AUTO: 80 %
NITRITE UR QL STRIP: NEGATIVE
NON-SQ EPI CELLS URNS QL MICRO: ABNORMAL /HPF
PH BODY FLUID: 7.5
PH UR STRIP.AUTO: 5 [PH]
PLATELET # BLD AUTO: 311 THOUSANDS/UL (ref 149–390)
PMV BLD AUTO: 10.5 FL (ref 8.9–12.7)
POTASSIUM SERPL-SCNC: 4.9 MMOL/L (ref 3.5–5.3)
PROT FLD-MCNC: 3.5 G/DL
PROT UR STRIP-MCNC: NEGATIVE MG/DL
PROTHROMBIN TIME: 30 SECONDS (ref 11.6–14.5)
RBC # BLD AUTO: 3.91 MILLION/UL (ref 3.88–5.62)
RBC #/AREA URNS AUTO: ABNORMAL /HPF
SITE: NORMAL
SODIUM SERPL-SCNC: 137 MMOL/L (ref 136–145)
SP GR UR STRIP.AUTO: 1.02 (ref 1–1.03)
TOTAL CELLS COUNTED SPEC: 100
UROBILINOGEN UR QL STRIP.AUTO: 1 E.U./DL
WBC # BLD AUTO: 23.82 THOUSAND/UL (ref 4.31–10.16)
WBC # FLD MANUAL: 4168 /UL
WBC #/AREA URNS AUTO: ABNORMAL /HPF

## 2019-12-24 PROCEDURE — 83615 LACTATE (LD) (LDH) ENZYME: CPT | Performed by: PHYSICIAN ASSISTANT

## 2019-12-24 PROCEDURE — 85027 COMPLETE CBC AUTOMATED: CPT | Performed by: PHYSICIAN ASSISTANT

## 2019-12-24 PROCEDURE — 71046 X-RAY EXAM CHEST 2 VIEWS: CPT

## 2019-12-24 PROCEDURE — 0W9B3ZZ DRAINAGE OF LEFT PLEURAL CAVITY, PERCUTANEOUS APPROACH: ICD-10-PCS | Performed by: RADIOLOGY

## 2019-12-24 PROCEDURE — 87205 SMEAR GRAM STAIN: CPT | Performed by: PHYSICIAN ASSISTANT

## 2019-12-24 PROCEDURE — 99024 POSTOP FOLLOW-UP VISIT: CPT | Performed by: THORACIC SURGERY (CARDIOTHORACIC VASCULAR SURGERY)

## 2019-12-24 PROCEDURE — 32555 ASPIRATE PLEURA W/ IMAGING: CPT | Performed by: RADIOLOGY

## 2019-12-24 PROCEDURE — 88112 CYTOPATH CELL ENHANCE TECH: CPT | Performed by: PATHOLOGY

## 2019-12-24 PROCEDURE — 90935 HEMODIALYSIS ONE EVALUATION: CPT | Performed by: INTERNAL MEDICINE

## 2019-12-24 PROCEDURE — 99024 POSTOP FOLLOW-UP VISIT: CPT | Performed by: RADIOLOGY

## 2019-12-24 PROCEDURE — 87070 CULTURE OTHR SPECIMN AEROBIC: CPT | Performed by: PHYSICIAN ASSISTANT

## 2019-12-24 PROCEDURE — 32555 ASPIRATE PLEURA W/ IMAGING: CPT

## 2019-12-24 PROCEDURE — 84157 ASSAY OF PROTEIN OTHER: CPT | Performed by: PHYSICIAN ASSISTANT

## 2019-12-24 PROCEDURE — 85610 PROTHROMBIN TIME: CPT | Performed by: PHYSICIAN ASSISTANT

## 2019-12-24 PROCEDURE — 81001 URINALYSIS AUTO W/SCOPE: CPT | Performed by: INTERNAL MEDICINE

## 2019-12-24 PROCEDURE — 5A1D70Z PERFORMANCE OF URINARY FILTRATION, INTERMITTENT, LESS THAN 6 HOURS PER DAY: ICD-10-PCS | Performed by: INTERNAL MEDICINE

## 2019-12-24 PROCEDURE — 88305 TISSUE EXAM BY PATHOLOGIST: CPT | Performed by: PATHOLOGY

## 2019-12-24 PROCEDURE — 82948 REAGENT STRIP/BLOOD GLUCOSE: CPT

## 2019-12-24 PROCEDURE — 80048 BASIC METABOLIC PNL TOTAL CA: CPT | Performed by: INTERNAL MEDICINE

## 2019-12-24 PROCEDURE — 89051 BODY FLUID CELL COUNT: CPT | Performed by: PHYSICIAN ASSISTANT

## 2019-12-24 PROCEDURE — 82945 GLUCOSE OTHER FLUID: CPT | Performed by: PHYSICIAN ASSISTANT

## 2019-12-24 PROCEDURE — 83986 ASSAY PH BODY FLUID NOS: CPT | Performed by: PHYSICIAN ASSISTANT

## 2019-12-24 PROCEDURE — 99232 SBSQ HOSP IP/OBS MODERATE 35: CPT | Performed by: INTERNAL MEDICINE

## 2019-12-24 RX ORDER — MIDODRINE HYDROCHLORIDE 5 MG/1
2.5 TABLET ORAL
Status: DISCONTINUED | OUTPATIENT
Start: 2019-12-24 | End: 2019-12-24

## 2019-12-24 RX ORDER — WARFARIN SODIUM 1 MG/1
1 TABLET ORAL
Status: COMPLETED | OUTPATIENT
Start: 2019-12-24 | End: 2019-12-24

## 2019-12-24 RX ORDER — MIDODRINE HYDROCHLORIDE 5 MG/1
5 TABLET ORAL
Status: DISCONTINUED | OUTPATIENT
Start: 2019-12-24 | End: 2019-12-27

## 2019-12-24 RX ADMIN — AMIODARONE HYDROCHLORIDE 200 MG: 200 TABLET ORAL at 06:32

## 2019-12-24 RX ADMIN — WARFARIN SODIUM 2 MG: 1 TABLET ORAL at 00:08

## 2019-12-24 RX ADMIN — INSULIN LISPRO 1 UNITS: 100 INJECTION, SOLUTION INTRAVENOUS; SUBCUTANEOUS at 16:30

## 2019-12-24 RX ADMIN — GUAIFENESIN 600 MG: 600 TABLET, EXTENDED RELEASE ORAL at 12:08

## 2019-12-24 RX ADMIN — PANTOPRAZOLE SODIUM 40 MG: 40 TABLET, DELAYED RELEASE ORAL at 12:08

## 2019-12-24 RX ADMIN — WARFARIN SODIUM 1 MG: 1 TABLET ORAL at 17:35

## 2019-12-24 RX ADMIN — DOCUSATE SODIUM 100 MG: 100 CAPSULE, LIQUID FILLED ORAL at 12:08

## 2019-12-24 RX ADMIN — ONDANSETRON 4 MG: 2 INJECTION INTRAMUSCULAR; INTRAVENOUS at 08:10

## 2019-12-24 RX ADMIN — GUAIFENESIN 600 MG: 600 TABLET, EXTENDED RELEASE ORAL at 20:56

## 2019-12-24 RX ADMIN — ONDANSETRON 4 MG: 2 INJECTION INTRAMUSCULAR; INTRAVENOUS at 20:13

## 2019-12-24 RX ADMIN — METOPROLOL TARTRATE 12.5 MG: 25 TABLET ORAL at 12:08

## 2019-12-24 RX ADMIN — MIDODRINE HYDROCHLORIDE 5 MG: 5 TABLET ORAL at 06:32

## 2019-12-24 RX ADMIN — INSULIN LISPRO 1 UNITS: 100 INJECTION, SOLUTION INTRAVENOUS; SUBCUTANEOUS at 21:58

## 2019-12-24 RX ADMIN — AMIODARONE HYDROCHLORIDE 200 MG: 200 TABLET ORAL at 21:00

## 2019-12-24 RX ADMIN — MIDODRINE HYDROCHLORIDE 5 MG: 5 TABLET ORAL at 12:08

## 2019-12-24 RX ADMIN — AMIODARONE HYDROCHLORIDE 200 MG: 200 TABLET ORAL at 16:28

## 2019-12-24 RX ADMIN — ATORVASTATIN CALCIUM 80 MG: 80 TABLET, FILM COATED ORAL at 16:29

## 2019-12-24 RX ADMIN — METOPROLOL TARTRATE 12.5 MG: 25 TABLET ORAL at 20:56

## 2019-12-24 RX ADMIN — MIDODRINE HYDROCHLORIDE 5 MG: 5 TABLET ORAL at 16:29

## 2019-12-24 RX ADMIN — ASPIRIN 81 MG 81 MG: 81 TABLET ORAL at 12:08

## 2019-12-24 NOTE — HEMODIALYSIS
Patient completed 2 5 hrs HD treatment, even run due to low BP, tolerated well    Post HD weight 80 5 kg

## 2019-12-24 NOTE — PROGRESS NOTES
Hgb read 11 6; per TRINY Grier, hafsa to administer 2 mg of Coumadin  HD cath currently clean, dry, intact with no further bleeding

## 2019-12-24 NOTE — PROGRESS NOTES
Cherrie Malik PA-C, at bedside to assess patient's bleeding HD cath  Since 1915 pm patient's HD cath stopped bleeding after manual pressure and several sterile 4X4 gauze dressings were used to slow down the bleeding  Per Juan José Becerra, and H&H is to be checked at 11 pm, the HD dressing can be changed, the 2 of Coumadin will be held until 11 pm H&H results, and no further intervention is needed at this time  Okay to administer Lipitor and Amiodarone  Pt is stable lying in bed

## 2019-12-24 NOTE — CONSULTS
Interventional Radiology  Consultation 2019     History of Present Illness:  70year old male with left pleural effusion is referred for thoracentesis  Past Medical History:   Diagnosis Date    Arthritis     BPH without urinary obstruction     CKD (chronic kidney disease), stage III (HCC)     baseline 1 6-1 7    GERD (gastroesophageal reflux disease)     Hyperlipidemia     Hypertension         Past Surgical History:   Procedure Laterality Date    APPENDECTOMY      COLONOSCOPY  2017    FRACTURE SURGERY      IR TEMP HD CATH  2019    IN ASCEND AORTA GRAFT W ROOT REPLACMENT  VALVE CONDUIT/CORON RECONSTRUCT N/A 12/15/2019    Procedure: BENTALL PROCEDURE (ASCENDING AORTIC REPAIR) with 26mm Gelweave Graft;  Surgeon: Akanksha Matos DO;  [facility]: [facility];  Service: Cardiac Surgery    IN RECONSTR TOTAL SHOULDER IMPLANT Right 2017    Procedure: ARTHROPLASTY SHOULDER REVERSE with OPEN CYST EXCISION;  Surgeon: Yun Farias MD;  [facility];  Service: Orthopedics    SHOULDER SURGERY      WRIST SURGERY          Social History     Tobacco Use   Smoking Status Former Smoker    Packs/day: 1 00    Last attempt to quit: Andrés De La Torre 39 Years since quittin 0   Smokeless Tobacco Never Used        Social History     Substance and Sexual Activity   Alcohol Use Not Currently        Social History     Substance and Sexual Activity   Drug Use Not Currently        No Known Allergies    Current Facility-Administered Medications   Medication Dose Route Frequency Provider Last Rate Last Dose    acetaminophen (TYLENOL) tablet 650 mg  650 mg Oral Q4H PRN Hilda Redd PA-C        amiodarone tablet 200 mg  200 mg Oral [facility] Alex Linder PA-C   200 mg at 19 4388    aspirin chewable tablet 81 mg  81 mg Oral Daily Sumaya Perry PA-C   81 mg at 19 1208    atorvastatin (LIPITOR) tablet 80 mg  80 mg Oral Daily With Tanya Jimenez PA-C   80 mg at 199    bisacodyl (DULCOLAX) rectal suppository 10 mg  10 mg Rectal Daily PRN [facility]        docusate sodium (COLACE) capsule 100 mg  100 mg Oral BID Josse Linder PA-C   100 mg at 12/24/19 1208    guaiFENesin (MUCINEX) 12 hr tablet 600 mg  600 mg Oral Q12H [address] Sumaya Perry PA-C   600 mg at 12/24/19 1208    insulin lispro (HumaLOG) 100 units/mL subcutaneous injection 1-5 Units  1-5 Units Subcutaneous TID [facility]   1 Units at 12/20/19 1544    insulin lispro (HumaLOG) 100 units/mL subcutaneous injection 1-5 Units  1-5 Units Subcutaneous HS [facility]   1 Units at 12/20/19 2121    metoprolol tartrate (LOPRESSOR) partial tablet 12 5 mg  12 5 mg Oral Q12H Al Carranza MD   12 5 mg at 12/24/19 1208    midodrine (PROAMATINE) tablet 5 mg  5 mg Oral TID AC Duncan High MD   5 mg at 12/24/19 1208    ondansetron (ZOFRAN) injection 4 mg  4 mg Intravenous Q6H PRN [facility]   4 mg at 12/24/19 0810    oxyCODONE-acetaminophen (PERCOCET) 5-325 mg per tablet 1 tablet  1 tablet Oral Q4H PRN [facility] PA-C   1 tablet at 12/22/19 0503    oxyCODONE-acetaminophen (PERCOCET) 5-325 mg per tablet 2 tablet  2 tablet Oral Q6H PRN [facility]   2 tablet at 12/21/19 1559    pantoprazole (PROTONIX) EC tablet 40 mg  40 mg Oral Daily Josse Linder PA-C   40 mg at 12/24/19 1208    polyethylene glycol (MIRALAX) packet 17 g  17 g Oral Daily Josse Linder PA-C   17 g at 12/22/19 2667    temazepam (RESTORIL) capsule 15 mg  15 mg Oral HS PRN Erick Reddy PA-C        warfarin (COUMADIN) tablet 1 mg  1 mg Oral Once (warfarin) Leatha Cavazos PA-C          Objective:    Vitals:    12/24/19 1000 12/24/19 1030 12/24/19 1055 12/24/19 1139   BP: 94/65 98/54 101/66 109/69   BP Location:    Left arm   Pulse: (!) 106 102 94 (!) 106   Resp: 18 (!) 23 18 18   Temp:   97 8 °F (36 6 °C) 97 5 °F (36 4 °C)   TempSrc:   Oral Oral   SpO2:    94%   Weight:       Height:            Physical Exam    Gen: NAD  Pulm: No resp distress    Lab Results   Component Value Date    WBC 23 82 (H) 12/24/2019    HGB 11 7 (L) 12/24/2019    HCT 36 2 (L) 12/24/2019    MCV 93 12/24/2019     12/24/2019     Lab Results   Component Value Date    INR 2 92 (H) 12/24/2019    INR 2 78 (H) 12/23/2019    INR 2 75 (H) 12/22/2019    PROTIME 30 0 (H) 12/24/2019    PROTIME 28 8 (H) 12/23/2019    PROTIME 28 6 (H) 12/22/2019     Lab Results   Component Value Date    PTT 41 (H) 12/15/2019     I have personally reviewed pertinent imaging and laboratory results  Assessment/Plan:  - Left thoracentesis  This procedure has been fully reviewed with the patient and written informed consent has been obtained

## 2019-12-24 NOTE — PROGRESS NOTES
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, Ascending aortic aneurysm     S/P Replacement of ascending aorta with aggressive hemiarch reconstruction and aortic valve resuspension with a 26 mm Dacron graft; POD # 9    24 Hour Events: No events overnight  Poor po intake this morning  Patient overall fatigue since dialysis started  Complaints only of being tired  He denies any pain  Ambulated 140 feet  In atrial fibrillation this morning rate of 100, has had paroxysmal afib since surgery, on coumadin therapy       Medications:   Scheduled Meds:  Current Facility-Administered Medications:  acetaminophen 650 mg Oral Q4H PRN Vonzella Lesch, PA-C   amiodarone 200 mg Oral [facility] Alex Sullivan PA-C   aspirin 81 mg Oral Daily Sumaya Perry PA-C   atorvastatin 80 mg Oral Daily With UnumProvidenrenny Linder PA-C   bisacodyl 10 mg Rectal Daily PRN Kasandra Linder PA-C   docusate sodium 100 mg Oral BID [facility] TRINY Linder   guaiFENesin 600 mg Oral Q12H [facility] Sumaya Perry PA-C   insulin lispro 1-5 Units Subcutaneous TID [facility] Alex NEETU Linder PA-C   insulin lispro 1-5 Units Subcutaneous HS Alex M TRINY Linder   metoprolol tartrate 12 5 mg Oral Q12H [facility] Speedy Farias MD   midodrine 5 mg Oral TID AC Sumaya Perry PA-C   ondansetron 4 mg Intravenous Q6H PRN Vonzella Lesch, PA-C   oxyCODONE-acetaminophen 1 tablet Oral Q4H PRN Vonzella Lesch, PA-C   oxyCODONE-acetaminophen 2 tablet Oral Q6H PRN Vonzella Lesch, PA-C   pantoprazole 40 mg Oral Daily Alex Linder PA-C   polyethylene glycol 17 g Oral Daily Kasandra Linder PA-C   temazepam 15 mg Oral HS PRN Kasandra Linder PA-C     Continuous Infusions:   PRN Meds:   acetaminophen    bisacodyl    ondansetron    oxyCODONE-acetaminophen    oxyCODONE-acetaminophen    temazepam    Vitals:   Vitals:    12/23/19 2304 12/24/19 0324 12/24/19 0600 12/24/19 0715   BP: 121/62 106/61  148/65   BP Location: Right arm Right arm  Right arm   Pulse: 71 70  (!) 107   Resp: 18 18  18   Temp: 97 7 °F (36 5 °C) 97 6 °F (36 4 °C)  (!) 97 4 °F (36 3 °C)   TempSrc: Oral Oral  Oral   SpO2: 94% 94%     Weight:   80 2 kg (176 lb 12 9 oz)    Height:           Telemetry: Atrial Fibrillation; Heart Rate: 107    Respiratory:   SpO2: SpO2: 94 %; 4 LPM    Intake/Output:   I/O       12/22 0701 - 12/23 0700 12/23 0701 - 12/24 0700 12/24 0701 - 12/25 0700    P  O  400 200     I V  (mL/kg)  500 (6 2)     IV Piggyback       Total Intake(mL/kg) 400 (4 9) 700 (8 7)     Urine (mL/kg/hr) 650 (0 3) 0 (0)     Other  495     Stool 0      Total Output 650 495     Net -250 +205            Unmeasured Urine Occurrence 2 x      Unmeasured Stool Occurrence 2 x          Weights:   Weight (last 2 days)     Date/Time   Weight    12/24/19 0600   80 2 (176 81)    12/23/19 0600   81 1 (178 79)    12/22/19 0457   81 2 (179 1)            Results:   Results from last 7 days   Lab Units 12/24/19  0532 12/23/19  2301 12/23/19  0448 12/22/19  0543   WBC Thousand/uL 23 82*  --  16 79* 16 09*   HEMOGLOBIN g/dL 11 7* 11 6* 11 6* 11 9*   HEMATOCRIT % 36 2* 35 1* 35 6* 36 7   PLATELETS Thousands/uL 311  --  235 216     Results from last 7 days   Lab Units 12/24/19  0532 12/23/19  0448 12/22/19  0543   SODIUM mmol/L 137 137 138   POTASSIUM mmol/L 4 9 4 6 4 1   CHLORIDE mmol/L 100 102 103   CO2 mmol/L 27 28 28   BUN mg/dL 117* 142* 125*   CREATININE mg/dL 4 92* 5 15* 4 33*   CALCIUM mg/dL 8 0* 8 1* 8 1*     Results from last 7 days   Lab Units 12/24/19  0532 12/23/19  0448 12/22/19  0544   INR  2 92* 2 78* 2 75*     Point of care glucose: 125-141    Invasive Lines/Tubes:  Invasive Devices     Peripheral Intravenous Line            Peripheral IV 12/23/19 Dorsal (posterior); Right Hand less than 1 day          Hemodialysis Catheter            HD Temporary Double Catheter less than 1 day                Physical Exam:    HEENT/NECK: PERRLA  No jugular venous distention  Cardiac: Irregularly irregular rate and rhythm  Pulmonary:  Decreased at left base, otherwise cta  Abdomen:  Non-tender and Non-distended  Incisions: Sternum is stable  Incision is clean, dry, and intact  Extremities: Extremities warm/dry and 1+ edema B/L  Neuro: Alert and oriented X 3  Skin: Warm/Dry, without rashes or lesions  Assessment:  Principal Problem:    Dissection of thoracic aorta (HCC)  Active Problems:    Benign essential hypertension    Abnormal TSH    Benign hypertension with CKD (chronic kidney disease) stage III (HCC)    Benign prostatic hyperplasia without lower urinary tract symptoms    S/P aorta repair    Leukocytosis    Thrombocytopenia (HCC)    Postoperative anemia due to acute blood loss    Anticoagulation goal of INR 2 to 3     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, Ascending aortic aneurysm     S/P Replacement of ascending aorta with aggressive hemiarch reconstruction and aortic valve resuspension with a 26 mm Dacron graft; POD # 9    Plan:    1  Cardiac:   Atrial Fibrillation; HR/BP well-controlled  Lopressor, 12 5mg PO BID   Decreased midodrine to 2 5 mg tid  Coumadin dosing for PAF - 1 mg tonight   Continue ASA and Statin therapy  Epicardial pacing wires out  Patient no longer has central IV access   Continue DVT prophylaxis    2  Pulmonary: CXR for decrease in left base, hypoxia and elevated WBC   Acute post-op pulmonary insufficiency; Requiring 4 liters via nasal cannla, secondary to pulmonary vascular congestion and pleural effusion  Continue incentive spirometry/coughing/deep breathing exercises  Wean supplemental oxygen as tolerated for saturation > 90%  Chest tubes have been discontinued    3  Renal: HD per nephrology recommendations     4  Neuro:  Neurologically intact; No active issues  Incisional pain well-controlled; Continue prn Percocet    5   GI:  Tolerating TLC 2 3 gm sodium diet  Maintain 1800 mL daily fluid restriction   Continue stool softeners and prn suppository  Continue GI prophylaxis    6  Endo:   No history of diabetes; Glucose well-controlled with sliding scale coverage    7    Hematology:    Post-operative acute blood loss anemia; Hemoglobin 11 7; trend prn    8  Disposition:      Follow daily PT/OT recommendations regarding home vs  rehab when medically cleared for discharge    VTE Pharmacologic Prophylaxis: Warfarin (Coumadin)  VTE Mechanical Prophylaxis: sequential compression device    Collaborative rounds completed with CHIDI Baez    Plan of care discussed with bedside nurse    SIGNATURE: Shaun Petty PA-C  DATE: December 24, 2019  TIME: 7:59 AM

## 2019-12-24 NOTE — PROGRESS NOTES
NEPHROLOGY PROGRESS NOTE   Oliver Sharpe 70 y o  male MRN: [id number]  Unit/Bed#: [facility] 406-01 Encounter: [id number]  Reason for Consult: DAMON/CKD    ASSESSMENT AND PLAN:  Damon on CKD stage 3, baseline creatinine 1 4 to 1 8  -DAMON suspected to be secondary to postop ATN/contrast nephropathy  -creatinine on admission 1 8 progressively worsened to 5 1 and started on I HD on 12/23/19  Tolerated 1st session well    -2nd session of HD today and will re-evaluate for any need for dialysis on 12/26/19    -BMP tomorrow a m   -continue to monitor intake and output  -CT scan this month showed atrophic right kidney, scattered areas of cortical thinning/scanning in the left kidney  -prior renal ultrasound in 2017 shows right kidney atrophy 7 8 cm, left kidney 9 7 cm with normal echogenicity  -urine output remains poor, check bladder scan for PVR    I saw and examined patient during hemodialysis treatment at 9:12 AM on 12/24/2019  The patient was receiving hemodialysis for treatment of DAMON  I also reviewed vital signs, intake and output, lab results and recent events, and agree with dialysis order  Tolerating HD  BP acceptable but lower      CKD anemia, hemoglobin to closely monitor, transfuse p r n  For hemoglobin less than seven     Blood pressure, BP remains on Lower normal range, increase midodrine to 5 mg t i d        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, 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  Close CT surgery follow-up      Discussed above plan in detail with CT surgery team    SUBJECTIVE:  Patient seen and examined at bedside  He still feels tired, still has occasional nausea  Has poor appetite  Will be getting 2nd session of HD today      OBJECTIVE:  Current Weight: Weight - Scale: 80 2 kg (176 lb 12 9 oz)  Vitals:    12/24/19 0830   BP: 101/52   Pulse: 96   Resp: 22   Temp:    SpO2:        Intake/Output Summary (Last 24 hours) at 12/24/2019 0906  Last data filed at 12/24/2019 0825  Gross per 24 hour   Intake 900 ml Output 495 ml   Net 405 ml     Wt Readings from Last 3 Encounters:   12/24/19 80 2 kg (176 lb 12 9 oz)   04/24/19 84 5 kg (186 lb 4 6 oz)   04/23/19 87 7 kg (193 lb 6 4 oz)     Temp Readings from Last 3 Encounters:   12/24/19 97 8 °F (36 6 °C) (Oral)   04/25/19 97 9 °F (36 6 °C) (Oral)   04/23/19 (!) 97 °F (36 1 °C)     BP Readings from Last 3 Encounters:   12/24/19 101/52   04/25/19 137/73   04/23/19 138/92     Pulse Readings from Last 3 Encounters:   12/24/19 96   04/25/19 81   04/23/19 (!) 46        Physical Examination:  General:  Lying in bed, no acute distress   Eyes:  Mild conjunctival pallor present, sclerae anicteric  ENT:  External examination of ears and nose unremarkable  Neck:  No obvious lymphadenopathy appreciated  Respiratory:  Bilateral air entry present, no crackles appreciated, decreased breath sound at base  CVS:  S1, S2 present  GI:  Soft, nontender, nondistended  CNS:  Sleepy although opens eyes, answering questions appropriately  Extremities:  No significant edema in legs  Skin:  No new rash in legs    Medications:    Current Facility-Administered Medications:     acetaminophen (TYLENOL) tablet 650 mg, 650 mg, Oral, Q4H PRN, Maciel Linder PA-C    amiodarone tablet 200 mg, 200 mg, Oral, Q8H [facility], Maciel Linder PA-C, 200 mg at 12/24/19 7914    aspirin chewable tablet 81 mg, 81 mg, Oral, Daily, Sumaya Perry PA-C, 81 mg at 12/23/19 0907    atorvastatin (LIPITOR) tablet 80 mg, 80 mg, Oral, Daily With Nga Aburto PA-C, 80 mg at 12/23/19 2049    bisacodyl (DULCOLAX) rectal suppository 10 mg, 10 mg, Rectal, Daily PRN, Maciel Linder PA-C    docusate sodium (COLACE) capsule 100 mg, 100 mg, Oral, BID, Alex Linder PA-C, 100 mg at 12/23/19 8274    guaiFENesin (MUCINEX) 12 hr tablet 600 mg, 600 mg, Oral, Q12H [facility], Sumaya Perry PA-C, 600 mg at 12/23/19 2049    insulin lispro (HumaLOG) 100 units/mL subcutaneous injection 1-5 Units, 1-5 Units, Subcutaneous, TID AC, 1 Units at 12/20/19 1544 **AND** Fingerstick Glucose (POCT), , , TID AC, Alex Linder PA-C    insulin lispro (HumaLOG) 100 units/mL subcutaneous injection 1-5 Units, 1-5 Units, Subcutaneous, HS, Giovany Morrow PA-C, 1 Units at 12/20/19 2121    metoprolol tartrate (LOPRESSOR) partial tablet 12 5 mg, 12 5 mg, Oral, Q12H [address], Bharath Burgos MD, 12 5 mg at 12/23/19 2050    midodrine (PROAMATINE) tablet 2 5 mg, 2 5 mg, Oral, TID ACJustina PA-C    ondansetron [facility]) injection 4 mg, 4 mg, Intravenous, Q6H PRN, Shania Linder PA-C, 4 mg at 12/24/19 0810    oxyCODONE-acetaminophen (PERCOCET) 5-325 mg per tablet 1 tablet, 1 tablet, Oral, Q4H PRN, Giovany Morrow PA-C, 1 tablet at 12/22/19 0503    oxyCODONE-acetaminophen (PERCOCET) 5-325 mg per tablet 2 tablet, 2 tablet, Oral, Q6H PRN, Giovany Morrow PA-C, 2 tablet at 12/21/19 1559    pantoprazole (PROTONIX) EC tablet 40 mg, 40 mg, Oral, Daily, Shania Linder PA-C, 40 mg at 12/23/19 8991    polyethylene glycol (MIRALAX) packet 17 g, 17 g, Oral, Daily, Shania Linder PA-C, 17 g at 12/22/19 2131    temazepam (RESTORIL) capsule 15 mg, 15 mg, Oral, HS PRN, Giovany Morrow PA-C    Laboratory Results:  Results from last 7 days   Lab Units 12/24/19  0532 12/23/19  2301 12/23/19  0448 12/22/19  0543 12/21/19  0519 12/20/19  1504 12/20/19  0429 12/19/19  0501 12/18/19  1806 12/18/19  0646  12/18/19  0506   WBC Thousand/uL 23 82*  --  16 79* 16 09* 14 44*  --  12 22* 13 95*  --  17 15*  --   --    HEMOGLOBIN g/dL 11 7* 11 6* 11 6* 11 9* 10 9*  --  9 8* 10 4*  --  10 5*   < >  --    HEMATOCRIT % 36 2* 35 1* 35 6* 36 7 32 9*  --  29 8* 31 5*  --  31 9*   < >  --    PLATELETS Thousands/uL 311  --  235 216 136*  --  103* 111*  --  104*  --   --    SODIUM mmol/L 137  --  137 138 137  --  139 141 142  --   --   --    POTASSIUM mmol/L 4 9  --  4 6 4 1 3 4* 4 1 3 0* 3 2* 4 0  --   --   --    CHLORIDE mmol/L 100  --  102 103 101  --  101 101 104  --   --   --    CO2 mmol/L 27  --  28 28 28 --  28 28 27  --   --   --    BUN mg/dL 117*  --  142* 125* 129*  --  127* 124* 126*  --   --   --    CREATININE mg/dL 4 92*  --  5 15* 4 33* 4 38*  --  5 14* 5 10* 5 13*  --   --   --    CALCIUM mg/dL 8 0*  --  8 1* 8 1* 8 1*  --  7 6* 8 0* 8 1*  --   --   --    MAGNESIUM mg/dL  --   --  3 0*  --   --   --   --   --   --   --   --  2 8*    < > = values in this interval not displayed  IR temp HD cath   Final Result by Kirti Owens MD (12/23 1919)   Impression:   Ultrasound and fluoroscopically guided placement of a 20 cm 14-Dominican nontunneled dialysis catheter via the right internal jugular vein  Catheter is ready for immediate use  Workstation performed: [id number]         XR chest pa & lateral   Final Result by Mariaelena Carvajal MD (12/20 3371)   Persistent small left basilar pleural effusion with lungs otherwise clear  Workstation performed: [id number]         XR chest portable   Final Result by Elaine Paige MD (52/52 0498)      Extubation  Mild bibasilar atelectasis  Expected appearance after cardiac surgery  Workstation performed: [id number]         XR chest portable ICU   Final Result by Asael Justin MD (31/61 4151)      1  Status post sternotomy  Lines and support devices appear in position as noted above  Central fullness of the pulmonary vasculature may represent mild pulmonary vascular congestion  Workstation performed: [id number]         CTA dissection protocol chest abdomen pelvis w wo contrast   Final Result by Venkatesh Amador MD (12/14 6140)      1  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 tapering to 4 1 cm at the aortic arch  2   There is crescentic intermediate density along the aortic wall extending from the aortic root through the aortic arch and along the proximal descending thoracic aorta compatible with acute type A intramural hematoma  Emergent vascular consultation    is recommended         I personally discussed this study with Boni Cockayne on 12/14/2019 at 10:57 PM                   Workstation performed: [id number]         XR chest 2 views   Final Result by Perry Gee MD (12/15 1048)      Cardiomegaly   No acute consolidation or congestion            Workstation performed: [id number]         XR chest pa & lateral    (Results Pending)       Portions of the record may have been created with voice recognition software  Occasional wrong word or "sound a like" substitutions may have occurred due to the inherent limitations of voice recognition software  Read the chart carefully and recognize, using context, where substitutions have occurred

## 2019-12-24 NOTE — PLAN OF CARE
Plan for a 2 5 hours of hemodialysis as tolerated   Serum K-4 9 and is on 2K bath on this therapy  Problem: METABOLIC, FLUID AND ELECTROLYTES - ADULT  Goal: Electrolytes maintained within normal limits  Description  INTERVENTIONS:  - Monitor labs and assess patient for signs and symptoms of electrolyte imbalances  - Administer electrolyte replacement as ordered  - Monitor response to electrolyte replacements, including repeat lab results as appropriate  - Instruct patient on fluid and nutrition as appropriate  Outcome: Progressing  Goal: Fluid balance maintained  Description  INTERVENTIONS:  - Monitor labs   - Monitor I/O and WT  - Instruct patient on fluid and nutrition as appropriate  - Assess for signs & symptoms of volume excess or deficit  Outcome: Progressing

## 2019-12-24 NOTE — BRIEF OP NOTE (RAD/CATH)
IR THORACENTESIS Procedure Note    PATIENT NAME: Aiden SEBASTIAN Core  : 1940  MRN: [id number]    Pre-op Diagnosis:   1  Dissection of thoracic aorta ([address] )    2  S/P aorta repair    3  Intramural aortic hematoma ([address] )    4  Acute kidney injury superimposed on chronic kidney disease (HCC)      Post-op Diagnosis:   1  Dissection of thoracic aorta ([address] )    2  S/P aorta repair    3  Intramural aortic hematoma ([facility] )    4  Acute kidney injury superimposed on chronic kidney disease [facility])        Surgeon:   Siobhan Frankel MD    Estimated Blood Loss: None    Findings: Successful left thoracentesis with 1 5 L bloody pleural fluid removed  Specimens: Sample sent to lab      Complications:  None    Anesthesia: Local    Siobhan Frankel MD     Date: 2019  Time: 3:43 PM

## 2019-12-24 NOTE — PROGRESS NOTES
Progress Note - Cardiology   [facility] A Core 70 y o  male MRN: [id number]  Unit/Bed#: [facility] 406-01 Encounter: [id number]    Assessment:  Principal Problem:    Dissection of thoracic aorta (HCC)  Active Problems:    Benign essential hypertension    Abnormal TSH    Benign hypertension with CKD (chronic kidney disease) stage III (HCC)    Benign prostatic hyperplasia without lower urinary tract symptoms    S/P aorta repair    Leukocytosis    Thrombocytopenia (HCC)    Postoperative anemia due to acute blood loss    Anticoagulation goal of INR 2 to 3  Type A dissection, s/p repair  Resuspension of aortic valve  PAF, at this time, back in afib  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  Hypotension, on midodrine  Plan:    Anticoagulation with warfarin, rate control of afib currently  HD per nephrology  Midodrine for hypotension  Subjective/Objective     Subjective:  Initiated HD  BP remains low  Objective:    Vitals: /69 (BP Location: Left arm)   Pulse (!) 106   Temp 97 5 °F (36 4 °C) (Oral)   Resp 18   Ht 5' 9" (1 753 m)   Wt 80 2 kg (176 lb 12 9 oz)   SpO2 94%   BMI 26 11 kg/m²   Vitals:    12/23/19 0600 12/24/19 0600   Weight: 81 1 kg (178 lb 12 7 oz) 80 2 kg (176 lb 12 9 oz)     Orthostatic Blood Pressures      Most Recent Value   Blood Pressure  109/69 filed at 12/24/2019 1139   Patient Position - Orthostatic VS  Lying filed at 12/24/2019 1139          Intake/Output Summary (Last 24 hours) at 12/24/2019 1320  Last data filed at 12/24/2019 1201  Gross per 24 hour   Intake 1200 ml   Output 1095 ml   Net 105 ml     Physical Exam:  GEN: Paco Core elderly man, laying in bed  Resting comfortably  NECK: supple, no carotid bruits   HEART: irregularly irregular  LUNGS: no rales     ABDOMEN: normal bowel sounds, soft, no tenderness, no distention  EXTREMITIES: peripheral pulses normal; no clubbing, cyanosis, or edema  SKIN: normal without suspicious lesions on exposed skin    Medications:    Current Facility-Administered Medications:     acetaminophen (TYLENOL) tablet 650 mg, 650 mg, Oral, Q4H PRN, Albertina Trotter PA-C    amiodarone tablet 200 mg, 200 mg, Oral, Q8H [address], Maxwell Linder PA-C, 200 mg at 12/24/19 0739    aspirin chewable tablet 81 mg, 81 mg, Oral, Daily, Sumaya Perry PA-C, 81 mg at 12/24/19 1208    atorvastatin (LIPITOR) tablet 80 mg, 80 mg, Oral, Daily With Vikki Lombardi PA-C, 80 mg at 12/23/19 2049    bisacodyl (DULCOLAX) rectal suppository 10 mg, 10 mg, Rectal, Daily PRN, Maxwell Linder PA-C    docusate sodium (COLACE) capsule 100 mg, 100 mg, Oral, BID, Alex Linder PA-C, 100 mg at 12/24/19 1208    guaiFENesin (MUCINEX) 12 hr tablet 600 mg, 600 mg, Oral, Q12H [address], Sumaya Perry PA-C, 600 mg at 12/24/19 1208    insulin lispro (HumaLOG) 100 units/mL subcutaneous injection 1-5 Units, 1-5 Units, Subcutaneous, TID AC, 1 Units at 12/20/19 1544 **AND** Fingerstick Glucose (POCT), , , TID AC, Alex Linder PA-C    insulin lispro (HumaLOG) 100 units/mL subcutaneous injection 1-5 Units, 1-5 Units, Subcutaneous, HS, Albertina Trotter PA-C, 1 Units at 12/20/19 2121    metoprolol tartrate (LOPRESSOR) partial tablet 12 5 mg, 12 5 mg, Oral, Q12H [address], Cynthia Santana MD, 12 5 mg at 12/24/19 1208    midodrine (PROAMATINE) tablet 5 mg, 5 mg, Oral, TID AC, Mark Larson MD, 5 mg at 12/24/19 1208    ondansetron (ZOFRAN) injection 4 mg, 4 mg, Intravenous, Q6H PRN, Maxwell Linder PA-C, 4 mg at 12/24/19 0810    oxyCODONE-acetaminophen (PERCOCET) 5-325 mg per tablet 1 tablet, 1 tablet, Oral, Q4H PRN, Albertina Trotter PA-C, 1 tablet at 12/22/19 0503    oxyCODONE-acetaminophen (PERCOCET) 5-325 mg per tablet 2 tablet, 2 tablet, Oral, Q6H PRN, Albertina Trotter PA-C, 2 tablet at 12/21/19 1559    pantoprazole (PROTONIX) EC tablet 40 mg, 40 mg, Oral, Daily, Alex Linder PA-C, 40 mg at 12/24/19 1208    polyethylene glycol (MIRALAX) packet 17 g, 17 g, Oral, Daily, Maxwell Linder PA-C, 17 g at 12/22/19 0828    temazepam (RESTORIL) capsule 15 mg, 15 mg, Oral, HS PRN, Esther Bumpers, PA-C    warfarin (COUMADIN) tablet 1 mg, 1 mg, Oral, Once (warfarin), Dolores Pedro PA-C    Lab Results:      Results from last 7 days   Lab Units 12/24/19  0532 12/23/19  2301 12/23/19  0448 12/22/19  0543   WBC Thousand/uL 23 82*  --  16 79* 16 09*   HEMOGLOBIN g/dL 11 7* 11 6* 11 6* 11 9*   HEMATOCRIT % 36 2* 35 1* 35 6* 36 7   PLATELETS Thousands/uL 311  --  235 216         Results from last 7 days   Lab Units 12/24/19  0532 12/23/19  0448 12/22/19  0543   POTASSIUM mmol/L 4 9 4 6 4 1   CHLORIDE mmol/L 100 102 103   CO2 mmol/L 27 28 28   BUN mg/dL 117* 142* 125*   CREATININE mg/dL 4 92* 5 15* 4 33*   CALCIUM mg/dL 8 0* 8 1* 8 1*     Results from last 7 days   Lab Units 12/24/19  0532 12/23/19  0448 12/22/19  0544   INR  2 92* 2 78* 2 75*     Results from last 7 days   Lab Units 12/23/19  0448 12/18/19  0506   MAGNESIUM mg/dL 3 0* 2 8*       Telemetry: Personally reviewed  In atrial fibrillation, paroxysmal  Rate controlled when in afib  DYLAN 2/15/19:  LEFT VENTRICLE:  Systolic Function: normal  Ejection Fraction: 65%  Cavity size: normal    RIGHT VENTRICLE:  Systolic Function: normal   Cavity size moderately dilated  Hypertrophy (severe LVH) present  AORTIC VALVE:  Leaflets: normal and trileaflet  Leaflet motions normal and normal  Stenosis: none  Regurgitation: trace  MITRAL VALVE:  Leaflets: calcified and normal  Leaflet Motions: normal  Regurgitation: none  Stenosis: none  TRICUSPID VALVE:  Leaflets: normal, annulus severely dilated and dilate annulus  Leaflet Motions: restricted  Stenosis: none  Regurgitation: moderate  PULMONIC VALVE:  Leaflets: normal  Regurgitation: none  Stenosis: none  ASCENDING AORTA:  Size:  normal  Dissection present  AORTIC ARCH:  Size:  normal    OTHER AORTIC FINDINGS:   Hematoma noted that extends from distal arch to mid descending aorta, 0 7 cm in thickness  RIGHT ATRIUM:  Size:  dilated   No spontaneous echo contrast   LEFT ATRIUM:  Size: normal    LEFT ATRIAL APPENDAGE:  Size: normal    ATRIAL SEPTUM:  Intra-atrial septal morphology: normal     VENTRICULAR SEPTUM:  Intra-ventricular septum morphology: normal    OTHER FINDINGS:  Pericardium:  normal  Pleural Effusion:  none  POSTPROCEDURE  LEFT VENTRICLE: Unchanged   RIGHT VENTRICLE:   Systolic Function: mildly depressed  AORTIC VALVE:   Leaflets: native  Stenosis: none  Regurgitation: mild  MITRAL VALVE: Unchanged   TRICUSPID VALVE:   Leaflets: native  Regurgitation: systolic flow reversal in hepatic vein and severe  PULMONIC VALVE: Unchanged   ATRIA: Unchanged   AORTA:   OTHER AORTA FINDINGS: The proximal aorta has been replaced  The mural hematoma on the descending aorta is preserved  Greyson Alonso REMOVAL:  Probe Removal: atraumatic  ECHOCARDIOGRAM COMMENTS:  Noted is the absence of right coronary ostia  The left coronary ostia appears to have two vessels arising from it  Confirmed surgically  Greyson Alonso

## 2019-12-24 NOTE — PHYSICAL THERAPY NOTE
Physical Therapy Cancellation Note    Chart reviewed; attempted to see pt in AM; pt is noted to be leaving for HD; will follow as clinical course allows      Oj Issa, PT

## 2019-12-24 NOTE — PLAN OF CARE
Problem: Potential for Falls  Goal: Patient will remain free of falls  Description  INTERVENTIONS:  - Assess patient frequently for physical needs  -  Identify cognitive and physical deficits and behaviors that affect risk of falls    -  [place] fall precautions as indicated by assessment   - Educate patient/family on patient safety including physical limitations  - Instruct patient to call for assistance with activity based on assessment  - Modify environment to reduce risk of injury  - Consider OT/PT consult to assist with strengthening/mobility  Outcome: Progressing     Problem: PAIN - ADULT  Goal: Verbalizes/displays adequate comfort level or baseline comfort level  Description  Interventions:  - Encourage patient to monitor pain and request assistance  - Assess pain using appropriate pain scale  - Administer analgesics based on type and severity of pain and evaluate response  - Implement non-pharmacological measures as appropriate and evaluate response  - Consider cultural and social influences on pain and pain management  - Notify physician/advanced practitioner if interventions unsuccessful or patient reports new pain  Outcome: Progressing     Problem: INFECTION - ADULT  Goal: Absence or prevention of progression during hospitalization  Description  INTERVENTIONS:  - Assess and monitor for signs and symptoms of infection  - Monitor lab/diagnostic results  - Monitor all insertion sites, i e  indwelling lines, tubes, and drains  - Monitor endotracheal if appropriate and nasal secretions for changes in amount and color  - [place] appropriate cooling/warming therapies per order  - Administer medications as ordered  - Instruct and encourage patient and family to use good hand hygiene technique  - Identify and instruct in appropriate isolation precautions for identified infection/condition  Outcome: Progressing     Problem: SAFETY ADULT  Goal: Maintain or return to baseline ADL function  Description  INTERVENTIONS:  -  Assess patient's ability to carry out ADLs; assess patient's baseline for ADL function and identify physical deficits which impact ability to perform ADLs (bathing, care of mouth/teeth, toileting, grooming, dressing, etc )  - Assess/evaluate cause of self-care deficits   - Assess range of motion  - Assess patient's mobility; develop plan if impaired  - Assess patient's need for assistive devices and provide as appropriate  - Encourage maximum independence but intervene and supervise when necessary  - Involve family in performance of ADLs  - Assess for home care needs following discharge   - Consider OT consult to assist with ADL evaluation and planning for discharge  - Provide patient education as appropriate  Outcome: Progressing  Goal: Maintain or return mobility status to optimal level  Description  INTERVENTIONS:  - Assess patient's baseline mobility status (ambulation, transfers, stairs, etc )    - Identify cognitive and physical deficits and behaviors that affect mobility  - Identify mobility aids required to assist with transfers and/or ambulation (gait belt, sit-to-stand, lift, walker, cane, etc )  - [place] fall precautions as indicated by assessment  - Record patient progress and toleration of activity level on Mobility SBAR; progress patient to next Phase/Stage  - Instruct patient to call for assistance with activity based on assessment  - Consider rehabilitation consult to assist with strengthening/weightbearing, etc   Outcome: Progressing     Problem: DISCHARGE PLANNING  Goal: Discharge to home or other facility with appropriate resources  Description  INTERVENTIONS:  - Identify barriers to discharge w/patient and caregiver  - Arrange for needed discharge resources and transportation as appropriate  - Identify discharge learning needs (meds, wound care, etc )  - Arrange for interpretive services to assist at discharge as needed  - Refer to Case Management Department for coordinating discharge planning if the patient needs post-hospital services based on physician/advanced practitioner order or complex needs related to functional status, cognitive ability, or social support system  Outcome: Progressing     Problem: Knowledge Deficit  Goal: Patient/family/caregiver demonstrates understanding of disease process, treatment plan, medications, and discharge instructions  Description  Complete learning assessment and assess knowledge base    Interventions:  - Provide teaching at level of understanding  - Provide teaching via preferred learning methods  Outcome: Progressing     Problem: NEUROSENSORY - ADULT  Goal: Achieves stable or improved neurological status  Description  INTERVENTIONS  - Monitor and report changes in neurological status  - Monitor vital signs such as temperature, blood pressure, glucose, and any other labs ordered   - Initiate measures to prevent increased intracranial pressure  - Monitor for seizure activity and implement precautions if appropriate      Outcome: Progressing  Goal: Remains free of injury related to seizures activity  Description  INTERVENTIONS  - Maintain airway, patient safety  and administer oxygen as ordered  - Monitor patient for seizure activity, document and report duration and description of seizure to physician/advanced practitioner  - If seizure occurs,  ensure patient safety during seizure  - Reorient patient post seizure  - Seizure pads on all 4 side rails  - Instruct patient/family to notify RN of any seizure activity including if an aura is experienced  - Instruct patient/family to call for assistance with activity based on nursing assessment  - Administer anti-seizure medications if ordered    Outcome: Progressing  Goal: Achieves maximal functionality and self care  Description  INTERVENTIONS  - Monitor swallowing and airway patency with patient fatigue and changes in neurological status  - Encourage and assist patient to increase activity and self care     - Encourage visually impaired, hearing impaired and aphasic patients to use assistive/communication devices  Outcome: Progressing     Problem: CARDIOVASCULAR - ADULT  Goal: Maintains optimal cardiac output and hemodynamic stability  Description  INTERVENTIONS:  - Monitor I/O, vital signs and rhythm  - Monitor for S/S and trends of decreased cardiac output  - Administer and titrate ordered vasoactive medications to optimize hemodynamic stability  - Assess quality of pulses, skin color and temperature  - Assess for signs of decreased coronary artery perfusion  - Instruct patient to report change in severity of symptoms  Outcome: Progressing  Goal: Absence of cardiac dysrhythmias or at baseline rhythm  Description  INTERVENTIONS:  - Continuous cardiac monitoring, vital signs, obtain 12 lead EKG if ordered  - Administer antiarrhythmic and heart rate control medications as ordered  - Monitor electrolytes and administer replacement therapy as ordered  Outcome: Progressing     Problem: RESPIRATORY - ADULT  Goal: Achieves optimal ventilation and oxygenation  Description  INTERVENTIONS:  - Assess for changes in respiratory status  - Assess for changes in mentation and behavior  - Position to facilitate oxygenation and minimize respiratory effort  - Oxygen administered by appropriate delivery if ordered  - Initiate smoking cessation education as indicated  - Encourage broncho-pulmonary hygiene including cough, deep breathe, Incentive Spirometry  - Assess the need for suctioning and aspirate as needed  - Assess and instruct to report SOB or any respiratory difficulty  - Respiratory Therapy support as indicated  Outcome: Progressing     Problem: METABOLIC, FLUID AND ELECTROLYTES - ADULT  Goal: Electrolytes maintained within normal limits  Description  INTERVENTIONS:  - Monitor labs and assess patient for signs and symptoms of electrolyte imbalances  - Administer electrolyte replacement as ordered  - Monitor response to electrolyte replacements, including repeat lab results as appropriate  - Instruct patient on fluid and nutrition as appropriate  Outcome: Progressing  Goal: Fluid balance maintained  Description  INTERVENTIONS:  - Monitor labs   - Monitor I/O and WT  - Instruct patient on fluid and nutrition as appropriate  - Assess for signs & symptoms of volume excess or deficit  Outcome: Progressing     Problem: Prexisting or High Potential for Compromised Skin Integrity  Goal: Skin integrity is maintained or improved  Description  INTERVENTIONS:  - Identify patients at risk for skin breakdown  - Assess and monitor skin integrity  - Assess and monitor nutrition and hydration status  - Monitor labs   - Assess for incontinence   - Turn and reposition patient  - Assist with mobility/ambulation  - Relieve pressure over bony prominences  - Avoid friction and shearing  - Provide appropriate hygiene as needed including keeping skin clean and dry  - Evaluate need for skin moisturizer/barrier cream  - Collaborate with interdisciplinary team   - Patient/family teaching  - Consider wound care consult   Outcome: Progressing

## 2019-12-24 NOTE — PROCEDURES
Central Line Insertion  Date/Time: 12/23/2019 7:17 PM  Performed by: Jud Gilbert MD  Authorized by: Jud Gilbert MD     Patient location:  IR  Other Assisting Provider: No    Consent:     Consent obtained:  Written    Consent given by:  Patient and healthcare agent    Risks discussed:  Arterial puncture, bleeding and infection    Alternatives discussed:  No treatment and delayed treatment  Universal protocol:     Procedure explained and questions answered to patient or proxy's satisfaction: yes      Relevant documents present and verified: yes      Test results available and properly labeled: yes      Radiology Images displayed and confirmed  If images not available, report reviewed: yes      Required blood products, implants, devices, and special equipment available: yes      Site/side marked: yes      Immediately prior to procedure, a time out was called: yes      Patient identity confirmed:  Verbally with patient and arm band  Pre-procedure details:     Hand hygiene: Hand hygiene performed prior to insertion      Sterile barrier technique:  All elements of maximal sterile technique followed      Skin preparation:  2% chlorhexidine    Skin preparation agent: Skin preparation agent completely dried prior to procedure    Indications:     Central line indications: dialysis    Procedure details:     Location:  Right internal jugular    Vessel type: vein      Laterality:  Right    Approach: percutaneous technique used      Patient position:  Flat    Catheter type:  Double lumen    Catheter size:  14 Fr    Landmarks identified: yes      Ultrasound guidance: yes      Sterile ultrasound techniques: Sterile gel and sterile probe covers were used      Number of attempts:  1    Successful placement: yes    Post-procedure details:     Post-procedure:  Dressing applied and line sutured    Assessment:  Placement verified by x-ray and blood return through all ports    Post-procedure complications: none      Patient tolerance of procedure:   Tolerated well, no immediate complications  Comments:      Right IJ 14 kriss non tunneled dialysis catheter placed    Ready to use

## 2019-12-25 LAB
ALBUMIN SERPL BCP-MCNC: 1.8 G/DL (ref 3.5–5)
ALP SERPL-CCNC: 168 U/L (ref 46–116)
ALT SERPL W P-5'-P-CCNC: 26 U/L (ref 12–78)
ANION GAP SERPL CALCULATED.3IONS-SCNC: 9 MMOL/L (ref 4–13)
AST SERPL W P-5'-P-CCNC: 66 U/L (ref 5–45)
BASOPHILS # BLD AUTO: 0.09 THOUSANDS/ΜL (ref 0–0.1)
BASOPHILS NFR BLD AUTO: 0 % (ref 0–1)
BILIRUB DIRECT SERPL-MCNC: 0.27 MG/DL (ref 0–0.2)
BILIRUB SERPL-MCNC: 0.64 MG/DL (ref 0.2–1)
BUN SERPL-MCNC: 108 MG/DL (ref 5–25)
CALCIUM SERPL-MCNC: 7.7 MG/DL (ref 8.3–10.1)
CHLORIDE SERPL-SCNC: 101 MMOL/L (ref 100–108)
CO2 SERPL-SCNC: 28 MMOL/L (ref 21–32)
CREAT SERPL-MCNC: 5.14 MG/DL (ref 0.6–1.3)
EOSINOPHIL # BLD AUTO: 0.06 THOUSAND/ΜL (ref 0–0.61)
EOSINOPHIL NFR BLD AUTO: 0 % (ref 0–6)
ERYTHROCYTE [DISTWIDTH] IN BLOOD BY AUTOMATED COUNT: 14.2 % (ref 11.6–15.1)
GFR SERPL CREATININE-BSD FRML MDRD: 10 ML/MIN/1.73SQ M
GLUCOSE SERPL-MCNC: 121 MG/DL (ref 65–140)
GLUCOSE SERPL-MCNC: 129 MG/DL (ref 65–140)
GLUCOSE SERPL-MCNC: 133 MG/DL (ref 65–140)
GLUCOSE SERPL-MCNC: 140 MG/DL (ref 65–140)
GLUCOSE SERPL-MCNC: 279 MG/DL (ref 65–140)
HCT VFR BLD AUTO: 34.9 % (ref 36.5–49.3)
HGB BLD-MCNC: 11.4 G/DL (ref 12–17)
IMM GRANULOCYTES # BLD AUTO: >0.5 THOUSAND/UL (ref 0–0.2)
IMM GRANULOCYTES NFR BLD AUTO: 2 % (ref 0–2)
INR PPP: 4.24 (ref 0.84–1.19)
LYMPHOCYTES # BLD AUTO: 0.84 THOUSANDS/ΜL (ref 0.6–4.47)
LYMPHOCYTES NFR BLD AUTO: 3 % (ref 14–44)
MCH RBC QN AUTO: 30.2 PG (ref 26.8–34.3)
MCHC RBC AUTO-ENTMCNC: 32.7 G/DL (ref 31.4–37.4)
MCV RBC AUTO: 93 FL (ref 82–98)
MONOCYTES # BLD AUTO: 3.2 THOUSAND/ΜL (ref 0.17–1.22)
MONOCYTES NFR BLD AUTO: 11 % (ref 4–12)
NEUTROPHILS # BLD AUTO: 25 THOUSANDS/ΜL (ref 1.85–7.62)
NEUTS SEG NFR BLD AUTO: 84 % (ref 43–75)
NRBC BLD AUTO-RTO: 0 /100 WBCS
PLATELET # BLD AUTO: 312 THOUSANDS/UL (ref 149–390)
PMV BLD AUTO: 10.3 FL (ref 8.9–12.7)
POTASSIUM SERPL-SCNC: 4.7 MMOL/L (ref 3.5–5.3)
PROT SERPL-MCNC: 5.6 G/DL (ref 6.4–8.2)
PROTHROMBIN TIME: 40.3 SECONDS (ref 11.6–14.5)
RBC # BLD AUTO: 3.77 MILLION/UL (ref 3.88–5.62)
SODIUM SERPL-SCNC: 138 MMOL/L (ref 136–145)
WBC # BLD AUTO: 29.73 THOUSAND/UL (ref 4.31–10.16)

## 2019-12-25 PROCEDURE — 85610 PROTHROMBIN TIME: CPT | Performed by: PHYSICIAN ASSISTANT

## 2019-12-25 PROCEDURE — 87070 CULTURE OTHR SPECIMN AEROBIC: CPT | Performed by: PHYSICIAN ASSISTANT

## 2019-12-25 PROCEDURE — 82948 REAGENT STRIP/BLOOD GLUCOSE: CPT

## 2019-12-25 PROCEDURE — 97116 GAIT TRAINING THERAPY: CPT

## 2019-12-25 PROCEDURE — 99024 POSTOP FOLLOW-UP VISIT: CPT | Performed by: THORACIC SURGERY (CARDIOTHORACIC VASCULAR SURGERY)

## 2019-12-25 PROCEDURE — 85025 COMPLETE CBC W/AUTO DIFF WBC: CPT | Performed by: PHYSICIAN ASSISTANT

## 2019-12-25 PROCEDURE — 80076 HEPATIC FUNCTION PANEL: CPT | Performed by: PHYSICIAN ASSISTANT

## 2019-12-25 PROCEDURE — 80048 BASIC METABOLIC PNL TOTAL CA: CPT | Performed by: INTERNAL MEDICINE

## 2019-12-25 PROCEDURE — 99233 SBSQ HOSP IP/OBS HIGH 50: CPT | Performed by: INTERNAL MEDICINE

## 2019-12-25 PROCEDURE — 87186 SC STD MICRODIL/AGAR DIL: CPT | Performed by: PHYSICIAN ASSISTANT

## 2019-12-25 PROCEDURE — 87077 CULTURE AEROBIC IDENTIFY: CPT | Performed by: PHYSICIAN ASSISTANT

## 2019-12-25 PROCEDURE — 94640 AIRWAY INHALATION TREATMENT: CPT

## 2019-12-25 PROCEDURE — 94760 N-INVAS EAR/PLS OXIMETRY 1: CPT

## 2019-12-25 PROCEDURE — 87205 SMEAR GRAM STAIN: CPT | Performed by: PHYSICIAN ASSISTANT

## 2019-12-25 PROCEDURE — 97110 THERAPEUTIC EXERCISES: CPT

## 2019-12-25 RX ORDER — LEVALBUTEROL INHALATION SOLUTION 0.63 MG/3ML
0.63 SOLUTION RESPIRATORY (INHALATION)
Status: DISCONTINUED | OUTPATIENT
Start: 2019-12-25 | End: 2019-12-25

## 2019-12-25 RX ORDER — PHYTONADIONE 5 MG/1
10 TABLET ORAL ONCE
Status: COMPLETED | OUTPATIENT
Start: 2019-12-25 | End: 2019-12-25

## 2019-12-25 RX ORDER — SODIUM PHOSPHATE, DIBASIC AND SODIUM PHOSPHATE, MONOBASIC 7; 19 G/133ML; G/133ML
1 ENEMA RECTAL ONCE
Status: DISCONTINUED | OUTPATIENT
Start: 2019-12-25 | End: 2019-12-25

## 2019-12-25 RX ORDER — LEVALBUTEROL 1.25 MG/.5ML
1.25 SOLUTION, CONCENTRATE RESPIRATORY (INHALATION)
Status: DISCONTINUED | OUTPATIENT
Start: 2019-12-25 | End: 2019-12-25

## 2019-12-25 RX ORDER — LEVALBUTEROL 1.25 MG/.5ML
1.25 SOLUTION, CONCENTRATE RESPIRATORY (INHALATION)
Status: DISCONTINUED | OUTPATIENT
Start: 2019-12-25 | End: 2019-12-31

## 2019-12-25 RX ORDER — SODIUM CHLORIDE FOR INHALATION 0.9 %
3 VIAL, NEBULIZER (ML) INHALATION
Status: DISCONTINUED | OUTPATIENT
Start: 2019-12-25 | End: 2019-12-31

## 2019-12-25 RX ADMIN — ONDANSETRON 4 MG: 2 INJECTION INTRAMUSCULAR; INTRAVENOUS at 05:57

## 2019-12-25 RX ADMIN — LEVALBUTEROL HYDROCHLORIDE 1.25 MG: 1.25 SOLUTION, CONCENTRATE RESPIRATORY (INHALATION) at 19:17

## 2019-12-25 RX ADMIN — PANTOPRAZOLE SODIUM 40 MG: 40 TABLET, DELAYED RELEASE ORAL at 09:26

## 2019-12-25 RX ADMIN — LEVALBUTEROL HYDROCHLORIDE 1.25 MG: 1.25 SOLUTION, CONCENTRATE RESPIRATORY (INHALATION) at 13:17

## 2019-12-25 RX ADMIN — GUAIFENESIN 600 MG: 600 TABLET, EXTENDED RELEASE ORAL at 21:30

## 2019-12-25 RX ADMIN — AMIODARONE HYDROCHLORIDE 200 MG: 200 TABLET ORAL at 13:31

## 2019-12-25 RX ADMIN — METRONIDAZOLE 500 MG: 500 INJECTION, SOLUTION INTRAVENOUS at 09:18

## 2019-12-25 RX ADMIN — CEFEPIME HYDROCHLORIDE 1000 MG: 1 INJECTION, POWDER, FOR SOLUTION INTRAMUSCULAR; INTRAVENOUS at 10:02

## 2019-12-25 RX ADMIN — METOPROLOL TARTRATE 12.5 MG: 25 TABLET ORAL at 21:32

## 2019-12-25 RX ADMIN — AMIODARONE HYDROCHLORIDE 200 MG: 200 TABLET ORAL at 21:30

## 2019-12-25 RX ADMIN — ISODIUM CHLORIDE 3 ML: 0.03 SOLUTION RESPIRATORY (INHALATION) at 19:17

## 2019-12-25 RX ADMIN — MIDODRINE HYDROCHLORIDE 5 MG: 5 TABLET ORAL at 12:01

## 2019-12-25 RX ADMIN — METRONIDAZOLE 500 MG: 500 INJECTION, SOLUTION INTRAVENOUS at 17:26

## 2019-12-25 RX ADMIN — PHYTONADIONE 10 MG: 5 TABLET ORAL at 09:27

## 2019-12-25 RX ADMIN — GUAIFENESIN 600 MG: 600 TABLET, EXTENDED RELEASE ORAL at 09:26

## 2019-12-25 RX ADMIN — ASPIRIN 81 MG 81 MG: 81 TABLET ORAL at 09:27

## 2019-12-25 RX ADMIN — INSULIN LISPRO 3 UNITS: 100 INJECTION, SOLUTION INTRAVENOUS; SUBCUTANEOUS at 17:27

## 2019-12-25 RX ADMIN — MIDODRINE HYDROCHLORIDE 5 MG: 5 TABLET ORAL at 06:02

## 2019-12-25 RX ADMIN — OXYCODONE HYDROCHLORIDE AND ACETAMINOPHEN 1 TABLET: 5; 325 TABLET ORAL at 22:23

## 2019-12-25 RX ADMIN — ISODIUM CHLORIDE 3 ML: 0.03 SOLUTION RESPIRATORY (INHALATION) at 13:17

## 2019-12-25 RX ADMIN — METOPROLOL TARTRATE 12.5 MG: 25 TABLET ORAL at 09:26

## 2019-12-25 RX ADMIN — OXYCODONE HYDROCHLORIDE AND ACETAMINOPHEN 2 TABLET: 5; 325 TABLET ORAL at 06:02

## 2019-12-25 RX ADMIN — MIDODRINE HYDROCHLORIDE 5 MG: 5 TABLET ORAL at 17:25

## 2019-12-25 RX ADMIN — AMIODARONE HYDROCHLORIDE 200 MG: 200 TABLET ORAL at 06:02

## 2019-12-25 NOTE — PHYSICAL THERAPY NOTE
Physical Therapy Treatment Note     12/25/19 1500   Pain Assessment   Pain Assessment No/denies pain   Pain Score No Pain   Restrictions/Precautions   Other Precautions Cardiac/sternal;Telemetry;O2;Fall Risk   General   Chart Reviewed Yes   Family/Caregiver Present No   Cognition   Overall Cognitive Status WFL   Arousal/Participation Alert; Responsive; Cooperative   Attention Within functional limits   Orientation Level Oriented X4   Memory Within functional limits   Following Commands Follows all commands and directions without difficulty   Subjective   Subjective " I already walked with the nurse "  PT reports being tired and not sleeping well last night  Bed Mobility   Supine to Sit 4  Minimal assistance   Additional items Assist x 1   Sit to Supine 5  Supervision   Additional items Assist x 1   Transfers   Sit to Stand 5  Supervision   Additional items Assist x 1; Increased time required   Stand to Sit 5  Supervision   Additional items Assist x 1; Increased time required   Ambulation/Elevation   Gait pattern Short stride; Excessively slow; Foward flexed  (lateral sway)   Gait Assistance 5  Supervision   Additional items Assist x 1   Assistive Device Rolling walker   Distance 190' x1   Balance   Static Sitting Good   Static Standing Fair +  (with Rw)   Dynamic Standing Fair  (with Rw)   Ambulatory Fair  (with Rw)   Endurance Deficit   Endurance Deficit Yes  (fatigue)   Activity Tolerance   Activity Tolerance Patient limited by fatigue   Exercises   Quad Sets Supine;10 reps;AROM; Bilateral   Heelslides Supine;10 reps;AROM; Bilateral   Hip Abduction Supine;10 reps;AROM; Bilateral   Hip Adduction Supine;10 reps;AROM; Bilateral   Knee AROM Short Arc Quad Supine;10 reps;AROM; Bilateral   Ankle Pumps Supine;10 reps;AROM; Bilateral   Assessment   Prognosis Good   Problem List Decreased endurance; Impaired balance;Decreased mobility   Assessment  Pt more fatigued this session and si demonstrating difficulty in performing supine to sit requirng min assist x1,  And peforms sit to supine with supervision  Pt continues to perform transfers to and from bed level with supervision and ambulates with use of Rw 190' x1 with supervision assist   Noted mild lateral sway during gait training, however no gross lob noted  NO GONZALES noted pt on 4l o2  Pt's o2 sat 96% with activity  PT instructed in and performed supine b/l le arom exercises x 10 reps  , with brief rest breaks between sets of exercises due to fatigue  Pt would benefit from written handout on HEP  PT returned to bed at conclusion of PT session due to fatigue and pt's request to take a nap as pt reports not sleeping well last night  Recommend continued inpt PT inorder to maximize functional endurance, activity tolerance, mobility and independence for safe d/c to home with family support  Goals   Patient Goals " To get home and back to regular da y to day activities "     [facility] Expiration Date 12/26/19   PT Treatment Day 3   Plan   Treatment/Interventions Functional transfer training;LE strengthening/ROM; Therapeutic exercise; Endurance training;Patient/family training;Equipment eval/education; Bed mobility;Gait training;Spoke to nursing   Progress Progressing toward goals   PT Frequency   (4-6x/week)   Recommendation   Recommendation Home PT; Home with family support   PT - OK to Discharge Yes         Celester LEANDRA Beth

## 2019-12-25 NOTE — PROGRESS NOTES
NEPHROLOGY PROGRESS NOTE   Oliver Sharpe 70 y o  male MRN: [id number]  Unit/Bed#: [facility] 406-01 Encounter: [id number]  Reason for Consult: DAMON/CKD    ASSESSMENT AND PLAN:  Damon on CKD stage 3, baseline creatinine 1 4 to 1 8  -DAMON suspected to be secondary to postop ATN/contrast nephropathy  -creatinine on admission 1 8 progressively worsened to 5 1 and started on I HD on 12/23/19    status post two sessions of dialysis, will do next 3rd session of HD tomorrow  -BMP tomorrow a m   -continue to monitor intake and output  -CT scan this month showed atrophic right kidney, scattered areas of cortical thinning/scanning in the left kidney  -prior renal ultrasound in 2017 shows right kidney atrophy 7 8 cm, left kidney 9 7 cm with normal echogenicity  -urine output remains poor although no accurate urine output has some occurrences were unmeasured due to mixed with stool, bladder scan for PVR nonsignificant    Severe azotemia with concern for uremic symptoms  -azotemia seems to be slowly improving with dialysis, still has occasional nausea and fair appetite   -continue to closely monitor     CKD anemia, hemoglobin to closely monitor, transfuse p r n  For hemoglobin less than seven  -check FOBT     Blood pressure, BP remains on Lower normal range, midodrine to 5 mg t i d        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, 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  [facility] CT surgery follow-up  Leukocytosis, empiric antibiotic as per primary team, chest x-ray shows left pleural effusion with underlying atelectasis versus infiltrate  Left-sided pleural effusion, status post thoracentesis, 1 5 L fluid removed on 12/24/19     Discussed above plan in detail with primary team      SUBJECTIVE:  Patient seen and examined at bedside  Patient had an episode of desaturation and currently on 5 L oxygen via nasal cannula, he was overall stable yesterday  Had thoracentesis done  Denies any chest pain, also has occasional nausea    Has fair appetite         OBJECTIVE:  Current Weight: Weight - Scale: 78 2 kg (172 lb 6 4 oz)  Vitals:    12/25/19 0926   BP: 110/56   Pulse: 68   Resp:    Temp:    SpO2:        Intake/Output Summary (Last 24 hours) at 12/25/2019 0941  Last data filed at 12/25/2019 3285  Gross per 24 hour   Intake 480 ml   Output 2100 ml   Net -1620 ml     Wt Readings from Last 3 Encounters:   12/25/19 78 2 kg (172 lb 6 4 oz)   04/24/19 84 5 kg (186 lb 4 6 oz)   04/23/19 87 7 kg (193 lb 6 4 oz)     Temp Readings from Last 3 Encounters:   12/25/19 (!) 97 2 °F (36 2 °C) (Oral)   04/25/19 97 9 °F (36 6 °C) (Oral)   04/23/19 (!) 97 °F (36 1 °C)     BP Readings from Last 3 Encounters:   12/25/19 110/56   04/25/19 137/73   04/23/19 138/92     Pulse Readings from Last 3 Encounters:   12/25/19 68   04/25/19 81   04/23/19 (!) 46        Physical Examination:  General:  Lying in bed, no acute distress   Eyes:  Mild conjunctival pallor present  ENT:  External examination of ears and nose unremarkable  Neck:  No obvious lymphadenopathy appreciated  Respiratory:  Decreased breath sound at bases  CVS:  S1, S2 present  GI:  Soft, nontender, nondistended  CNS:  Active, alert, oriented  Extremities:  No significant edema in legs  Skin:  No new rash in legs    Medications:    Current Facility-Administered Medications:     acetaminophen (TYLENOL) tablet 650 mg, 650 mg, Oral, Q4H PRN, Cherelle Linder PA-C    amiodarone tablet 200 mg, 200 mg, Oral, Q8H [facility], Alex Linder PA-C, 200 mg at 12/25/19 0602    aspirin chewable tablet 81 mg, 81 mg, Oral, Daily, Sumaya Perry PA-C, 81 mg at 12/25/19 1445    bisacodyl (DULCOLAX) rectal suppository 10 mg, 10 mg, Rectal, Daily PRN, Cherelle Linder PA-C    cefepime (MAXIPIME) 1,000 mg in dextrose 5 % 50 mL IVPB, 1,000 mg, Intravenous, Q24H, Ag Paul Weaver PA-C    guaiFENesin (MUCINEX) 12 hr tablet 600 mg, 600 mg, Oral, Q12H FILIBERTO, Sumaya Perry PA-C, 600 mg at 12/25/19 0949    insulin lispro (HumaLOG) 100 units/mL subcutaneous injection 1-5 Units, 1-5 Units, Subcutaneous, TID AC, 1 Units at 12/24/19 1630 **AND** Fingerstick Glucose (POCT), , , TID AC, Alex Linder PA-C    insulin lispro (HumaLOG) 100 units/mL subcutaneous injection 1-5 Units, 1-5 Units, Subcutaneous, HS, Jose Parish PA-C, 1 Units at 12/24/19 2158    levalbuterol [facility]) inhalation solution 1 25 mg, 1 25 mg, Nebulization, TID, Annabella King DO    metoprolol tartrate (LOPRESSOR) partial tablet 12 5 mg, 12 5 mg, Oral, Q12H [facility], Yajaira Willis MD, 12 5 mg at 12/25/19 0926    metroNIDAZOLE (FLAGYL) IVPB (premix) 500 mg, 500 mg, Intravenous, Q8H, Annie Lopez PA-C, Last Rate: 200 mL/hr at 12/25/19 0918, 500 mg at 12/25/19 0918    midodrine (PROAMATINE) tablet 5 mg, 5 mg, Oral, TID AC, Pablo Gomez MD, 5 mg at 12/25/19 0602    ondansetron (ZOFRAN) injection 4 mg, 4 mg, Intravenous, Q6H PRN, Yulisa Linder PA-C, 4 mg at 12/25/19 0557    oxyCODONE-acetaminophen (PERCOCET) 5-325 mg per tablet 1 tablet, 1 tablet, Oral, Q4H PRN, Jose Parish PA-C, 1 tablet at 12/22/19 0503    oxyCODONE-acetaminophen (PERCOCET) 5-325 mg per tablet 2 tablet, 2 tablet, Oral, Q6H PRN, Jose Parish PA-C, 2 tablet at 12/25/19 0602    pantoprazole (PROTONIX) EC tablet 40 mg, 40 mg, Oral, Daily, Yulisa Linder PA-C, 40 mg at 12/25/19 1622    polyethylene glycol (MIRALAX) packet 17 g, 17 g, Oral, Daily, Yulisa Linder PA-C, 17 g at 12/22/19 3157    sodium chloride 0 9 % inhalation solution 3 mL, 3 mL, Nebulization, TID, Annabella King DO    temazepam (RESTORIL) capsule 15 mg, 15 mg, Oral, HS PRN, Jose Parish PA-C    Laboratory Results:  Results from last 7 days   Lab Units 12/25/19  0442 12/24/19  0532 12/23/19  2301 12/23/19  0448 12/22/19  0543 12/21/19  0519 12/20/19  1504 12/20/19  0429 12/19/19  0501   WBC Thousand/uL 29 73* 23 82*  --  16 79* 16 09* 14 44*  --  12 22* 13 95*   HEMOGLOBIN g/dL 11 4* 11 7* 11 6* 11 6* 11 9* 10 9*  --  9 8* 10 4* HEMATOCRIT % 34 9* 36 2* 35 1* 35 6* 36 7 32 9*  --  29 8* 31 5*   PLATELETS Thousands/uL 312 311  --  235 216 136*  --  103* 111*   SODIUM mmol/L 138 137  --  137 138 137  --  139 141   POTASSIUM mmol/L 4 7 4 9  --  4 6 4 1 3 4* 4 1 3 0* 3 2*   CHLORIDE mmol/L 101 100  --  102 103 101  --  101 101   CO2 mmol/L 28 27  --  28 28 28  --  28 28   BUN mg/dL 108* 117*  --  142* 125* 129*  --  127* 124*   CREATININE mg/dL 5 14* 4 92*  --  5 15* 4 33* 4 38*  --  5 14* 5 10*   CALCIUM mg/dL 7 7* 8 0*  --  8 1* 8 1* 8 1*  --  7 6* 8 0*   MAGNESIUM mg/dL  --   --   --  3 0*  --   --   --   --   --        IR thoracentesis   Final Result by Gabriele Christianson MD (12/24 1622)   Impression:    Successful ultrasound-guided left thoracentesis  Workstation performed: [id number]         XR chest pa & lateral   Final Result by Jessica Weller MD (12/24 1433)      Increased size of left pleural effusion/underlying left basilar atelectasis or infiltrate  Workstation performed: [id number]         IR temp HD cath   Final Result by Henry Weathers MD (12/23 1919)   Impression:   Ultrasound and fluoroscopically guided placement of a 20 cm 14-Kinyarwanda nontunneled dialysis catheter via the right internal jugular vein  Catheter is ready for immediate use  Workstation performed: [id number]         XR chest pa & lateral   Final Result by Dasha Acosta MD (12/20 6457)   Persistent small left basilar pleural effusion with lungs otherwise clear  Workstation performed: [id number]         XR chest portable   Final Result by Rock Fredi MD (42/66 1368)      Extubation  Mild bibasilar atelectasis  Expected appearance after cardiac surgery  Workstation performed: [id number]         XR chest portable ICU   Final Result by Priscila Campos MD (59/46 6931)      1  Status post sternotomy  Lines and support devices appear in position as noted above    Central fullness of the pulmonary vasculature may represent mild pulmonary vascular congestion  Workstation performed: [id number]         CTA dissection protocol chest abdomen pelvis w wo contrast   Final Result by Heber Blizzard, MD (12/14 2300)      1  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 tapering to 4 1 cm at the aortic arch  2   There is crescentic intermediate density along the aortic wall extending from the aortic root through the aortic arch and along the proximal descending thoracic aorta compatible with acute type A intramural hematoma  Emergent vascular consultation    is recommended  I personally discussed this study with Sandhya Abbasi on 12/14/2019 at 10:57 PM                   Workstation performed: [id number]         XR chest 2 views   Final Result by Becca Bahena MD (12/15 5116)      Cardiomegaly   No acute consolidation or congestion            Workstation performed: [id number]             Portions of the record may have been created with voice recognition software  Occasional wrong word or "sound a like" substitutions may have occurred due to the inherent limitations of voice recognition software  Read the chart carefully and recognize, using context, where substitutions have occurred

## 2019-12-25 NOTE — PROGRESS NOTES
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, Ascending aortic aneurysm     S/P Replacement of ascending aorta with aggressive hemiarch reconstruction and aortic valve resuspension with a 26 mm Dacron graft; POD # 10    24 Hour Events: productive cough overnight yellow/tan sputum  Overall complaints of fatigue  Appetite is fair  Nausea associated with po medications  No pain this morning  Ambulating independently despite oxygen needs and RN requesting patient to call for assistance  Ir thoracentsis yesterday removed 1 5L of bloody drainage       Medications:   Scheduled Meds:  Current Facility-Administered Medications:  acetaminophen 650 mg Oral Q4H PRN Giovany Morrow PA-C   amiodarone 200 mg Oral [facility] Alex Sullivan PA-C   aspirin 81 mg Oral Daily Sumaya Perry PA-C   atorvastatin 80 mg Oral Daily With [employer] Linder PA-C   bisacodyl 10 mg Rectal Daily PRN Sharlett Clipper CrossTRINY   docusate sodium 100 mg Oral BID Alex M TRINY Linder   guaiFENesin 600 mg Oral Q12H [address] Sumaya Perry PA-C   insulin lispro 1-5 Units Subcutaneous TID [facility] Alex M TRINY Linder   insulin lispro 1-5 Units Subcutaneous HS [facility] TRINY Linder   metoprolol tartrate 12 5 mg Oral Q12H [address] Bharath Burgos MD   midodrine 5 mg Oral TID AC Darlin Braden MD   ondansetron 4 mg Intravenous Q6H PRN Giovany Morrow PA-C   oxyCODONE-acetaminophen 1 tablet Oral Q4H PRN Giovany Morrow PA-C   oxyCODONE-acetaminophen 2 tablet Oral Q6H PRN Sharlett Clipper CrossTRINY   pantoprazole 40 mg Oral Daily Alex  TRINY Linder   polyethylene glycol 17 g Oral Daily Sharlett Clipper CrossTRINY   temazepam 15 mg Oral HS PRN Sharlett Clipper CrossTRINY     Continuous Infusions:   PRN Meds:   acetaminophen    bisacodyl    ondansetron    oxyCODONE-acetaminophen    oxyCODONE-acetaminophen    temazepam    Vitals:   Vitals:    12/24/19 1630 12/24/19 2006 12/25/19 0000 12/25/19 0324   BP: 107/59 107/58 102/58 107/60   BP Location: Left arm Left arm Left arm    Pulse: 74 68 63 65   Resp: 20 18 16 16   Temp: 97 8 °F (36 6 °C) 97 8 °F (36 6 °C) 97 9 °F (36 6 °C) 97 5 °F (36 4 °C)   TempSrc: Oral Oral Oral Oral   SpO2: 96% 92% 95% 92%   Weight:       Height:           Telemetry: NSR; Heart Rate: 69    Respiratory:   SpO2: SpO2: 92 %; 3 LPM    Intake/Output:   I/O       12/23 0701 - 12/24 0700 12/24 0701 - 12/25 0700    P  O  200     I V  (mL/kg) 500 (6 2) 500 (6 2)    Total Intake(mL/kg) 700 (8 7) 500 (6 2)    Urine (mL/kg/hr) 0 (0) 100 (0 1)    Other 495 2000    Total Output 495 2100    Net +205 -1600          Unmeasured Urine Occurrence  1 x    Unmeasured Stool Occurrence  1 x        Weights:   Weight (last 2 days)     Date/Time   Weight    12/24/19 0600   80 2 (176 81)    12/23/19 0600   81 1 (178 79)            Results:   Results from last 7 days   Lab Units 12/25/19  0442 12/24/19  0532 12/23/19  2301 12/23/19  0448   WBC Thousand/uL 29 73* 23 82*  --  16 79*   HEMOGLOBIN g/dL 11 4* 11 7* 11 6* 11 6*   HEMATOCRIT % 34 9* 36 2* 35 1* 35 6*   PLATELETS Thousands/uL 312 311  --  235     Results from last 7 days   Lab Units 12/25/19  0442 12/24/19  0532 12/23/19  0448   SODIUM mmol/L 138 137 137   POTASSIUM mmol/L 4 7 4 9 4 6   CHLORIDE mmol/L 101 100 102   CO2 mmol/L 28 27 28   BUN mg/dL 108* 117* 142*   CREATININE mg/dL 5 14* 4 92* 5 15*   CALCIUM mg/dL 7 7* 8 0* 8 1*     Results from last 7 days   Lab Units 12/24/19  0532 12/23/19  0448 12/22/19  0544   INR  2 92* 2 78* 2 75*     Point of care glucose: 117-198    Studies:  CXR from yesterday with Left pleural effusion, left atlectasis or infiltrate     I have personally reviewed pertinent reports  Invasive Lines/Tubes:  Invasive Devices     Peripheral Intravenous Line            Peripheral IV 12/23/19 Dorsal (posterior); Right Hand 1 day          Hemodialysis Catheter            HD Temporary Double Catheter 1 day                Physical Exam:    HEENT/NECK:  PERRLA  No jugular venous distention  Cardiac: Regular rate and rhythm  Pulmonary:  rhonchi and crackles bilaterally   Abdomen:  Non-tender, Non-distended and Normal bowel sounds  Incisions: Sternum is stable  Incision is clean, dry, and intact  Extremities: Extremities warm/dry and Trace edema B/L  Neuro: Alert and oriented X 3  Skin: Warm/Dry, without rashes or lesions  Assessment:  Principal Problem:    Dissection of thoracic aorta (HCC)  Active Problems:    Benign essential hypertension    Abnormal TSH    Benign hypertension with CKD (chronic kidney disease) stage III (HCC)    Benign prostatic hyperplasia without lower urinary tract symptoms    S/P aorta repair    Leukocytosis    Thrombocytopenia (HCC)    Postoperative anemia due to acute blood loss    Anticoagulation goal of INR 2 to 3       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, Ascending aortic aneurysm     S/P Replacement of ascending aorta with aggressive hemiarch reconstruction and aortic valve resuspension with a 26 mm Dacron graft; POD # 10    Plan:    1  Cardiac:   NSR; HR/BP well-controlled  Lopressor, 12 5mg PO BID  Continue ASA and Statin therapy  Epicardial pacing wires out  Patient no longer has central IV access   Continue DVT prophylaxis    ID: WBC to 29k today/patient afebrile,  CXR with atlectasis or infiltrate, sputum cultured this morning will follow up, may need intitation of abx therapy    2  Pulmonary:   Good Room air oxygen saturation; Continue incentive spirometry/Coughing/Deep breathing exercises  Chest tubes have been discontinued    3  Renal: HD per nephrology recommendations     4  Neuro:  Neurologically intact; No active issues  Incisional pain well-controlled; Continue prn Percocet    5  GI:  Appetite is fair   Maintain 1800 mL daily fluid restriction   Continue stool softeners and prn suppository  Continue GI prophylaxis    6   Endo:   No history of diabetes; Glucose well-controlled with sliding scale coverage    7    Hematology:    Post-operative acute blood loss anemia; Hemoglobin 11 4; trend prn    8  Disposition:      Follow daily PT/OT recommendations regarding home vs  rehab when medically cleared for discharge    VTE Pharmacologic Prophylaxis: Heparin  VTE Mechanical Prophylaxis: sequential compression device    Collaborative rounds completed with CHIDI Norwood    Plan of care discussed with bedside nurse    SIGNATURE: Thiago Doran PA-C  DATE: December 25, 2019  TIME: 6:05 AM

## 2019-12-25 NOTE — RESPIRATORY THERAPY NOTE
RT Protocol Note  Oliver Sharpe 70 y o  male MRN: [id number]  Unit/Bed#: [facility] 406-01 Encounter: [id number]    Assessment    Principal Problem:    Dissection of thoracic aorta ([address] )  Active Problems:    Benign essential hypertension    Abnormal TSH    Benign hypertension with CKD (chronic kidney disease) stage III (HCC)    Benign prostatic hyperplasia without lower urinary tract symptoms    S/P aorta repair    Leukocytosis    Thrombocytopenia (HCC)    Postoperative anemia due to acute blood loss    Anticoagulation goal of INR 2 to 3      Home Pulmonary Medications:  none       Past Medical History:   Diagnosis Date    Arthritis     BPH without urinary obstruction     CKD (chronic kidney disease), stage III (HCC)     baseline 1 6- 7    GERD (gastroesophageal reflux disease)     Hyperlipidemia     Hypertension      Social History     Socioeconomic History    Marital status: /Civil Union     Spouse name: None    Number of children: None    Years of education: None    Highest education level: None   Occupational History    None   Social Needs    Financial resource strain: None    Food insecurity:     Worry: None     Inability: None    Transportation needs:     Medical: None     Non-medical: None   Tobacco Use    Smoking status: Former Smoker     Packs/day: 1 00     Last attempt to quit: 1980     Years since quittin 0    Smokeless tobacco: Never Used   Substance and Sexual Activity    Alcohol use: Not Currently    Drug use: Not Currently    Sexual activity: None   Lifestyle    Physical activity:     Days per week: None     Minutes per session: None    Stress: None   Relationships    Social connections:     Talks on phone: None     Gets together: None     Attends L[employer] service: None     Active member of club or organization: None     Attends meetings of clubs or organizations: None     Relationship status: None    Intimate partner violence:     Fear of current or ex partner: None Emotionally abused: None     Physically abused: None     Forced sexual activity: None   Other Topics Concern    None   Social History Narrative    Daily caffeine consumption, 1 serving a day    Drinks coffee        Subjective    Subjective Data: pt currently in no resp distress  BS diminished/clear, SpO2 96% on room air  No resp UDN indicated at this time  Will follow pt post cardiac surgery    Objective    Physical Exam:   Assessment Type: (P) Assess only  General Appearance: (P) Awake  Respiratory Pattern: (P) Normal  Chest Assessment: (P) Chest expansion symmetrical  Bilateral Breath Sounds: (P) Diminished, Clear    Vitals:  Blood pressure 106/54, pulse 66, temperature (!) 97 2 °F (36 2 °C), temperature source Oral, resp  rate 18, height 5' 9" (1 753 m), weight 78 2 kg (172 lb 6 4 oz), SpO2 92 %  Imaging and other studies: I have personally reviewed pertinent reports  O2 Device: nc     Plan    Respiratory Plan: (P) No distress/Pulmonary history  Airway Clearance Plan: Discontinue Protocol     Resp Comments: (P) Pt admitted post cardiac surgery  Per pts family, pt has a hx of COPD however, does not take anything at home  Pt c/o of SOB at this time, no wheezes are heard at this time  Will order TID xopenex and saline for sob  and cont to follow via resp protocol

## 2019-12-25 NOTE — PLAN OF CARE
Problem: PHYSICAL THERAPY ADULT  Goal: Performs mobility at highest level of function for planned discharge setting  See evaluation for individualized goals  Description  Treatment/Interventions: Functional transfer training, LE strengthening/ROM, Therapeutic exercise, Endurance training, Bed mobility, Gait training, Spoke to nursing, OT  Equipment Recommended: Walker(at this time)       See flowsheet documentation for full assessment, interventions and recommendations  Outcome: Progressing  Note:   Prognosis: Good  Problem List: Decreased endurance, Impaired balance, Decreased mobility  Assessment:  Pt more fatigued this session and si demonstrating difficulty in performing supine to sit requirng min assist x1,  And peforms sit to supine with supervision  Pt continues to perform transfers to and from bed level with supervision and ambulates with use of Rw 190' x1 with supervision assist   Noted mild lateral sway during gait training, however no gross lob noted  NO GONZALES noted pt on 4l o2  Pt's o2 sat 96% with activity  PT instructed in and performed supine b/l le arom exercises x 10 reps  , with brief rest breaks between sets of exercises due to fatigue  Pt would benefit from written handout on HEP  PT returned to bed at conclusion of PT session due to fatigue and pt's request to take a nap as pt reports not sleeping well last night  Recommend continued inpt PT inorder to maximize functional endurance, activity tolerance, mobility and independence for safe d/c to home with family support  Barriers to Discharge: None     Recommendation: Home PT, Home with family support     PT - OK to Discharge: Yes    See flowsheet documentation for full assessment

## 2019-12-26 ENCOUNTER — APPOINTMENT (INPATIENT)
Dept: DIALYSIS | Facility: HOSPITAL | Age: 79
DRG: 219 | End: 2019-12-26
Payer: COMMERCIAL

## 2019-12-26 ENCOUNTER — APPOINTMENT (INPATIENT)
Dept: RADIOLOGY | Facility: HOSPITAL | Age: 79
DRG: 219 | End: 2019-12-26
Payer: COMMERCIAL

## 2019-12-26 LAB
ANION GAP SERPL CALCULATED.3IONS-SCNC: 11 MMOL/L (ref 4–13)
BASOPHILS # BLD AUTO: 0.08 THOUSANDS/ΜL (ref 0–0.1)
BASOPHILS NFR BLD AUTO: 0 % (ref 0–1)
BUN SERPL-MCNC: 136 MG/DL (ref 5–25)
CALCIUM SERPL-MCNC: 7.6 MG/DL (ref 8.3–10.1)
CHLORIDE SERPL-SCNC: 99 MMOL/L (ref 100–108)
CO2 SERPL-SCNC: 23 MMOL/L (ref 21–32)
CREAT SERPL-MCNC: 6.68 MG/DL (ref 0.6–1.3)
EOSINOPHIL # BLD AUTO: 0.1 THOUSAND/ΜL (ref 0–0.61)
EOSINOPHIL NFR BLD AUTO: 0 % (ref 0–6)
ERYTHROCYTE [DISTWIDTH] IN BLOOD BY AUTOMATED COUNT: 14.2 % (ref 11.6–15.1)
GFR SERPL CREATININE-BSD FRML MDRD: 7 ML/MIN/1.73SQ M
GLUCOSE SERPL-MCNC: 103 MG/DL (ref 65–140)
GLUCOSE SERPL-MCNC: 149 MG/DL (ref 65–140)
GLUCOSE SERPL-MCNC: 162 MG/DL (ref 65–140)
GLUCOSE SERPL-MCNC: 197 MG/DL (ref 65–140)
HCT VFR BLD AUTO: 36.9 % (ref 36.5–49.3)
HGB BLD-MCNC: 11.7 G/DL (ref 12–17)
IMM GRANULOCYTES # BLD AUTO: 0.47 THOUSAND/UL (ref 0–0.2)
IMM GRANULOCYTES NFR BLD AUTO: 2 % (ref 0–2)
INR PPP: 3.02 (ref 0.84–1.19)
LYMPHOCYTES # BLD AUTO: 0.86 THOUSANDS/ΜL (ref 0.6–4.47)
LYMPHOCYTES NFR BLD AUTO: 3 % (ref 14–44)
MCH RBC QN AUTO: 29.8 PG (ref 26.8–34.3)
MCHC RBC AUTO-ENTMCNC: 31.7 G/DL (ref 31.4–37.4)
MCV RBC AUTO: 94 FL (ref 82–98)
MONOCYTES # BLD AUTO: 2.4 THOUSAND/ΜL (ref 0.17–1.22)
MONOCYTES NFR BLD AUTO: 9 % (ref 4–12)
NEUTROPHILS # BLD AUTO: 22.47 THOUSANDS/ΜL (ref 1.85–7.62)
NEUTS SEG NFR BLD AUTO: 86 % (ref 43–75)
NRBC BLD AUTO-RTO: 0 /100 WBCS
PLATELET # BLD AUTO: 288 THOUSANDS/UL (ref 149–390)
PMV BLD AUTO: 10.1 FL (ref 8.9–12.7)
POTASSIUM SERPL-SCNC: 4.8 MMOL/L (ref 3.5–5.3)
PROTHROMBIN TIME: 30.8 SECONDS (ref 11.6–14.5)
RBC # BLD AUTO: 3.93 MILLION/UL (ref 3.88–5.62)
SODIUM SERPL-SCNC: 133 MMOL/L (ref 136–145)
WBC # BLD AUTO: 26.38 THOUSAND/UL (ref 4.31–10.16)

## 2019-12-26 PROCEDURE — 71046 X-RAY EXAM CHEST 2 VIEWS: CPT

## 2019-12-26 PROCEDURE — 94760 N-INVAS EAR/PLS OXIMETRY 1: CPT

## 2019-12-26 PROCEDURE — 82948 REAGENT STRIP/BLOOD GLUCOSE: CPT

## 2019-12-26 PROCEDURE — 80048 BASIC METABOLIC PNL TOTAL CA: CPT | Performed by: PHYSICIAN ASSISTANT

## 2019-12-26 PROCEDURE — 99024 POSTOP FOLLOW-UP VISIT: CPT | Performed by: THORACIC SURGERY (CARDIOTHORACIC VASCULAR SURGERY)

## 2019-12-26 PROCEDURE — 99232 SBSQ HOSP IP/OBS MODERATE 35: CPT | Performed by: INTERNAL MEDICINE

## 2019-12-26 PROCEDURE — 87040 BLOOD CULTURE FOR BACTERIA: CPT | Performed by: PHYSICIAN ASSISTANT

## 2019-12-26 PROCEDURE — 90935 HEMODIALYSIS ONE EVALUATION: CPT | Performed by: INTERNAL MEDICINE

## 2019-12-26 PROCEDURE — 94640 AIRWAY INHALATION TREATMENT: CPT

## 2019-12-26 PROCEDURE — 85025 COMPLETE CBC W/AUTO DIFF WBC: CPT | Performed by: PHYSICIAN ASSISTANT

## 2019-12-26 PROCEDURE — 85610 PROTHROMBIN TIME: CPT | Performed by: PHYSICIAN ASSISTANT

## 2019-12-26 RX ORDER — ALBUMIN (HUMAN) 12.5 G/50ML
12.5 SOLUTION INTRAVENOUS ONCE
Status: COMPLETED | OUTPATIENT
Start: 2019-12-26 | End: 2019-12-26

## 2019-12-26 RX ADMIN — METRONIDAZOLE 500 MG: 500 INJECTION, SOLUTION INTRAVENOUS at 22:25

## 2019-12-26 RX ADMIN — MIDODRINE HYDROCHLORIDE 5 MG: 5 TABLET ORAL at 13:09

## 2019-12-26 RX ADMIN — INSULIN LISPRO 1 UNITS: 100 INJECTION, SOLUTION INTRAVENOUS; SUBCUTANEOUS at 06:53

## 2019-12-26 RX ADMIN — PANTOPRAZOLE SODIUM 40 MG: 40 TABLET, DELAYED RELEASE ORAL at 08:00

## 2019-12-26 RX ADMIN — INSULIN LISPRO 1 UNITS: 100 INJECTION, SOLUTION INTRAVENOUS; SUBCUTANEOUS at 22:31

## 2019-12-26 RX ADMIN — ISODIUM CHLORIDE 3 ML: 0.03 SOLUTION RESPIRATORY (INHALATION) at 08:11

## 2019-12-26 RX ADMIN — AMIODARONE HYDROCHLORIDE 200 MG: 200 TABLET ORAL at 22:23

## 2019-12-26 RX ADMIN — METOPROLOL TARTRATE 12.5 MG: 25 TABLET ORAL at 08:00

## 2019-12-26 RX ADMIN — ISODIUM CHLORIDE 3 ML: 0.03 SOLUTION RESPIRATORY (INHALATION) at 19:04

## 2019-12-26 RX ADMIN — GUAIFENESIN 600 MG: 600 TABLET, EXTENDED RELEASE ORAL at 22:23

## 2019-12-26 RX ADMIN — ALBUMIN (HUMAN) 12.5 G: 5 SOLUTION INTRAVENOUS at 10:49

## 2019-12-26 RX ADMIN — GUAIFENESIN 600 MG: 600 TABLET, EXTENDED RELEASE ORAL at 08:00

## 2019-12-26 RX ADMIN — ISODIUM CHLORIDE 3 ML: 0.03 SOLUTION RESPIRATORY (INHALATION) at 13:29

## 2019-12-26 RX ADMIN — LEVALBUTEROL HYDROCHLORIDE 1.25 MG: 1.25 SOLUTION, CONCENTRATE RESPIRATORY (INHALATION) at 13:29

## 2019-12-26 RX ADMIN — LEVALBUTEROL HYDROCHLORIDE 1.25 MG: 1.25 SOLUTION, CONCENTRATE RESPIRATORY (INHALATION) at 08:11

## 2019-12-26 RX ADMIN — ONDANSETRON 4 MG: 2 INJECTION INTRAMUSCULAR; INTRAVENOUS at 07:22

## 2019-12-26 RX ADMIN — CEFEPIME HYDROCHLORIDE 1000 MG: 1 INJECTION, POWDER, FOR SOLUTION INTRAMUSCULAR; INTRAVENOUS at 14:02

## 2019-12-26 RX ADMIN — OXYCODONE HYDROCHLORIDE AND ACETAMINOPHEN 2 TABLET: 5; 325 TABLET ORAL at 14:32

## 2019-12-26 RX ADMIN — LEVALBUTEROL HYDROCHLORIDE 1.25 MG: 1.25 SOLUTION, CONCENTRATE RESPIRATORY (INHALATION) at 19:04

## 2019-12-26 RX ADMIN — MIDODRINE HYDROCHLORIDE 5 MG: 5 TABLET ORAL at 06:18

## 2019-12-26 RX ADMIN — MIDODRINE HYDROCHLORIDE 5 MG: 5 TABLET ORAL at 16:39

## 2019-12-26 RX ADMIN — AMIODARONE HYDROCHLORIDE 200 MG: 200 TABLET ORAL at 13:09

## 2019-12-26 RX ADMIN — METOPROLOL TARTRATE 12.5 MG: 25 TABLET ORAL at 22:23

## 2019-12-26 RX ADMIN — AMIODARONE HYDROCHLORIDE 200 MG: 200 TABLET ORAL at 06:18

## 2019-12-26 RX ADMIN — ASPIRIN 81 MG 81 MG: 81 TABLET ORAL at 08:00

## 2019-12-26 RX ADMIN — ONDANSETRON 4 MG: 2 INJECTION INTRAMUSCULAR; INTRAVENOUS at 18:20

## 2019-12-26 RX ADMIN — METRONIDAZOLE 500 MG: 500 INJECTION, SOLUTION INTRAVENOUS at 13:09

## 2019-12-26 RX ADMIN — METRONIDAZOLE 500 MG: 500 INJECTION, SOLUTION INTRAVENOUS at 00:31

## 2019-12-26 NOTE — PROGRESS NOTES
No significant events during the shift  Pt to have dialysis treatment tomorrow morning  Creatinine 5 14 and INR 4 24 with Coumadin not dosed tonight  I&Os being accurately monitored per Nephrology  HD cath clean, dry, intact and capped  Pt still coughing up yellow sputum, performing incentive spirometer up to 750, and went for several walks throughout the day

## 2019-12-26 NOTE — PLAN OF CARE
Problem: Potential for Falls  Goal: Patient will remain free of falls  Description  INTERVENTIONS:  - Assess patient frequently for physical needs  -  Identify cognitive and physical deficits and behaviors that affect risk of falls    -  [place] fall precautions as indicated by assessment   - Educate patient/family on patient safety including physical limitations  - Instruct patient to call for assistance with activity based on assessment  - Modify environment to reduce risk of injury  - Consider OT/PT consult to assist with strengthening/mobility  Outcome: Progressing     Problem: PAIN - ADULT  Goal: Verbalizes/displays adequate comfort level or baseline comfort level  Description  Interventions:  - Encourage patient to monitor pain and request assistance  - Assess pain using appropriate pain scale  - Administer analgesics based on type and severity of pain and evaluate response  - Implement non-pharmacological measures as appropriate and evaluate response  - Consider cultural and social influences on pain and pain management  - Notify physician/advanced practitioner if interventions unsuccessful or patient reports new pain  Outcome: Progressing     Problem: INFECTION - ADULT  Goal: Absence or prevention of progression during hospitalization  Description  INTERVENTIONS:  - Assess and monitor for signs and symptoms of infection  - Monitor lab/diagnostic results  - Monitor all insertion sites, i e  indwelling lines, tubes, and drains  - Monitor endotracheal if appropriate and nasal secretions for changes in amount and color  - Cutler appropriate cooling/warming therapies per order  - Administer medications as ordered  - Instruct and encourage patient and family to use good hand hygiene technique  - Identify and instruct in appropriate isolation precautions for identified infection/condition  Outcome: Progressing     Problem: SAFETY ADULT  Goal: Maintain or return to baseline ADL function  Description  INTERVENTIONS:  -  Assess patient's ability to carry out ADLs; assess patient's baseline for ADL function and identify physical deficits which impact ability to perform ADLs (bathing, care of mouth/teeth, toileting, grooming, dressing, etc )  - Assess/evaluate cause of self-care deficits   - Assess range of motion  - Assess patient's mobility; develop plan if impaired  - Assess patient's need for assistive devices and provide as appropriate  - Encourage maximum independence but intervene and supervise when necessary  - Involve family in performance of ADLs  - Assess for home care needs following discharge   - Consider OT consult to assist with ADL evaluation and planning for discharge  - Provide patient education as appropriate  Outcome: Progressing  Goal: Maintain or return mobility status to optimal level  Description  INTERVENTIONS:  - Assess patient's baseline mobility status (ambulation, transfers, stairs, etc )    - Identify cognitive and physical deficits and behaviors that affect mobility  - Identify mobility aids required to assist with transfers and/or ambulation (gait belt, sit-to-stand, lift, walker, cane, etc )  - [place] fall precautions as indicated by assessment  - Record patient progress and toleration of activity level on Mobility SBAR; progress patient to next Phase/Stage  - Instruct patient to call for assistance with activity based on assessment  - Consider rehabilitation consult to assist with strengthening/weightbearing, etc   Outcome: Progressing     Problem: DISCHARGE PLANNING  Goal: Discharge to home or other facility with appropriate resources  Description  INTERVENTIONS:  - Identify barriers to discharge w/patient and caregiver  - Arrange for needed discharge resources and transportation as appropriate  - Identify discharge learning needs (meds, wound care, etc )  - Arrange for interpretive services to assist at discharge as needed  - Refer to Case Management Department for coordinating discharge planning if the patient needs post-hospital services based on physician/advanced practitioner order or complex needs related to functional status, cognitive ability, or social support system  Outcome: Progressing     Problem: Knowledge Deficit  Goal: Patient/family/caregiver demonstrates understanding of disease process, treatment plan, medications, and discharge instructions  Description  Complete learning assessment and assess knowledge base    Interventions:  - Provide teaching at level of understanding  - Provide teaching via preferred learning methods  Outcome: Progressing     Problem: NEUROSENSORY - ADULT  Goal: Achieves stable or improved neurological status  Description  INTERVENTIONS  - Monitor and report changes in neurological status  - Monitor vital signs such as temperature, blood pressure, glucose, and any other labs ordered   - Initiate measures to prevent increased intracranial pressure  - Monitor for seizure activity and implement precautions if appropriate      Outcome: Progressing  Goal: Remains free of injury related to seizures activity  Description  INTERVENTIONS  - Maintain airway, patient safety  and administer oxygen as ordered  - Monitor patient for seizure activity, document and report duration and description of seizure to physician/advanced practitioner  - If seizure occurs,  ensure patient safety during seizure  - Reorient patient post seizure  - Seizure pads on all 4 side rails  - Instruct patient/family to notify RN of any seizure activity including if an aura is experienced  - Instruct patient/family to call for assistance with activity based on nursing assessment  - Administer anti-seizure medications if ordered    Outcome: Progressing  Goal: Achieves maximal functionality and self care  Description  INTERVENTIONS  - Monitor swallowing and airway patency with patient fatigue and changes in neurological status  - Encourage and assist patient to increase activity and self care     - Encourage visually impaired, hearing impaired and aphasic patients to use assistive/communication devices  Outcome: Progressing     Problem: CARDIOVASCULAR - ADULT  Goal: Maintains optimal cardiac output and hemodynamic stability  Description  INTERVENTIONS:  - Monitor I/O, vital signs and rhythm  - Monitor for S/S and trends of decreased cardiac output  - Administer and titrate ordered vasoactive medications to optimize hemodynamic stability  - Assess quality of pulses, skin color and temperature  - Assess for signs of decreased coronary artery perfusion  - Instruct patient to report change in severity of symptoms  Outcome: Progressing  Goal: Absence of cardiac dysrhythmias or at baseline rhythm  Description  INTERVENTIONS:  - Continuous cardiac monitoring, vital signs, obtain 12 lead EKG if ordered  - Administer antiarrhythmic and heart rate control medications as ordered  - Monitor electrolytes and administer replacement therapy as ordered  Outcome: Progressing     Problem: RESPIRATORY - ADULT  Goal: Achieves optimal ventilation and oxygenation  Description  INTERVENTIONS:  - Assess for changes in respiratory status  - Assess for changes in mentation and behavior  - Position to facilitate oxygenation and minimize respiratory effort  - Oxygen administered by appropriate delivery if ordered  - Initiate smoking cessation education as indicated  - Encourage broncho-pulmonary hygiene including cough, deep breathe, Incentive Spirometry  - Assess the need for suctioning and aspirate as needed  - Assess and instruct to report SOB or any respiratory difficulty  - Respiratory Therapy support as indicated  Outcome: Progressing     Problem: METABOLIC, FLUID AND ELECTROLYTES - ADULT  Goal: Electrolytes maintained within normal limits  Description  INTERVENTIONS:  - Monitor labs and assess patient for signs and symptoms of electrolyte imbalances  - Administer electrolyte replacement as ordered  - Monitor response to electrolyte replacements, including repeat lab results as appropriate  - Instruct patient on fluid and nutrition as appropriate  Outcome: Progressing  Goal: Fluid balance maintained  Description  INTERVENTIONS:  - Monitor labs   - Monitor I/O and WT  - Instruct patient on fluid and nutrition as appropriate  - Assess for signs & symptoms of volume excess or deficit  Outcome: Progressing     Problem: Prexisting or High Potential for Compromised Skin Integrity  Goal: Skin integrity is maintained or improved  Description  INTERVENTIONS:  - Identify patients at risk for skin breakdown  - Assess and monitor skin integrity  - Assess and monitor nutrition and hydration status  - Monitor labs   - Assess for incontinence   - Turn and reposition patient  - Assist with mobility/ambulation  - Relieve pressure over bony prominences  - Avoid friction and shearing  - Provide appropriate hygiene as needed including keeping skin clean and dry  - Evaluate need for skin moisturizer/barrier cream  - Collaborate with interdisciplinary team   - Patient/family teaching  - Consider wound care consult   Outcome: Progressing     Problem: Nutrition/Hydration-ADULT  Goal: Nutrient/Hydration intake appropriate for improving, restoring or maintaining nutritional needs  Description  Monitor and assess patient's nutrition/hydration status for malnutrition  Collaborate with interdisciplinary team and initiate plan and interventions as ordered  Monitor patient's weight and dietary intake as ordered or per policy  Utilize nutrition screening tool and intervene as necessary  Determine patient's food preferences and provide high-protein, high-caloric foods as appropriate       INTERVENTIONS:  - Monitor oral intake, urinary output, labs, and treatment plans  - Assess nutrition and hydration status and recommend course of action  - Evaluate amount of meals eaten  - Assist patient with eating if necessary   - Allow adequate time for meals  - Recommend/ encourage appropriate diets, oral nutritional supplements, and vitamin/mineral supplements  - Order, calculate, and assess calorie counts as needed  - Recommend, monitor, and adjust tube feedings and TPN/PPN based on assessed needs  - Assess need for intravenous fluids  - Provide specific nutrition/hydration education as appropriate  - Include patient/family/caregiver in decisions related to nutrition  Outcome: Progressing

## 2019-12-26 NOTE — PROGRESS NOTES
NEPHROLOGY PROGRESS NOTE   Oliver Sharpe 70 y o  male MRN: [id number]  Unit/Bed#: [facility] 406-01 Encounter: [id number]  Reason for Consult: DAMON/CKD    ASSESSMENT AND PLAN:  Damon on CKD stage 3, baseline creatinine 1 4 to 1 8  -DAMON suspected to be secondary to postop ATN/contrast nephropathy  -creatinine on admission 1 8 progressively worsened to 5 1 and started on I HD on 12/23/19  third session of HD today  -BMP tomorrow a m   -continue to monitor intake and output  -CT scan this month showed atrophic right kidney, scattered areas of cortical thinning/scarring in the left kidney  -prior renal ultrasound in 2017 shows right kidney atrophy 7 8 cm, left kidney 9 7 cm with normal echogenicity   -check repeat renal ultrasound for better assessment of echogenicity, kidney size  -urine output remains poor although? Accurate    I saw and examined patient during hemodialysis treatment at 10:21 AM on 12/26/2019  The patient was receiving hemodialysis for treatment of DAMON  I also reviewed vital signs, intake and output, lab results and recent events, and agree with dialysis order  Tolerating HD  BP lower    Currently has temporary HD catheter and may need to change to PermCath if continued to require dialysis  with ongoing leukocytosis, will continue with temporary catheter for now      Severe azotemia with concern for uremic symptoms  -continue to monitor with ongoing dialysis  still has occasional nausea and fair appetite   -continue to closely monitor     CKD anemia, hemoglobin to closely monitor, transfuse p r n  For hemoglobin less than seven  -check FOBT     Blood pressure, BP remains on Lower normal range, midodrine to 5 mg t i d        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, 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  Juvencio Draper CT surgery follow-up    Leukocytosis, empiric antibiotic as per primary team, chest x-ray shows left pleural effusion with underlying atelectasis versus infiltrate      Left-sided pleural effusion, status post thoracentesis, 1 5 L fluid removed on 12/24/19     Discussed above plan in detail with primary team    SUBJECTIVE:  Patient seen and examined at bedside  He had an episode of vomiting , still has occasional nausea   Also has fair appetite    No chest pain, shortness of breath, abdominal pain    OBJECTIVE:  Current Weight: Weight - Scale: 79 4 kg (175 lb 1 6 oz)  Vitals:    12/26/19 1003   BP: (!) 83/40   Pulse: 76   Resp: 18   Temp:    SpO2:        Intake/Output Summary (Last 24 hours) at 12/26/2019 1013  Last data filed at 12/26/2019 1000  Gross per 24 hour   Intake 1410 ml   Output 450 ml   Net 960 ml     Wt Readings from Last 3 Encounters:   12/26/19 79 4 kg (175 lb 1 6 oz)   04/24/19 84 5 kg (186 lb 4 6 oz)   04/23/19 87 7 kg (193 lb 6 4 oz)     Temp Readings from Last 3 Encounters:   12/26/19 98 °F (36 7 °C) (Oral)   04/25/19 97 9 °F (36 6 °C) (Oral)   04/23/19 (!) 97 °F (36 1 °C)     BP Readings from Last 3 Encounters:   12/26/19 (!) 83/40   04/25/19 137/73   04/23/19 138/92     Pulse Readings from Last 3 Encounters:   12/26/19 76   04/25/19 81   04/23/19 (!) 46        Physical Examination:  General:  Lying in bed, no acute distress   Eyes:  Mild conjunctival pallor present  ENT:  External examination of ears and nose unremarkable  Neck:  No obvious lymphadenopathy appreciated  Respiratory:  Bilateral air entry present  CVS:  S1, S2 present  GI:  Soft, nontender, nondistended  CNS:  Active alert oriented  Extremities:  No significant edema in legs  Skin:  No new rash in legs    Medications:    Current Facility-Administered Medications:     acetaminophen (TYLENOL) tablet 650 mg, 650 mg, Oral, Q4H PRN, Alex Linder PA-C    albumin human (FLEXBUMIN) 25 % injection 12 5 g, 12 5 g, Intravenous, Once, Padma Choudhary MD    amiodarone tablet 200 mg, 200 mg, Oral, Q8H [address], Zaida Clifford Linder PA-C, 200 mg at 12/26/19 7511    aspirin chewable tablet 81 mg, 81 mg, Oral, Daily, Sumaya Perry PA-C, 81 mg at 12/26/19 0800    bisacodyl (DULCOLAX) rectal suppository 10 mg, 10 mg, Rectal, Daily PRN, Juno Linder PA-C    cefepime (MAXIPIME) 1,000 mg in dextrose 5 % 50 mL IVPB, 1,000 mg, Intravenous, Q24H, Alona Lopez PA-C, Last Rate: 100 mL/hr at 12/25/19 1002, 1,000 mg at 12/25/19 1002    guaiFENesin (MUCINEX) 12 hr tablet 600 mg, 600 mg, Oral, Q12H [address], Sumaya Perry PA-C, 600 mg at 12/26/19 0800    insulin lispro (HumaLOG) 100 units/mL subcutaneous injection 1-5 Units, 1-5 Units, Subcutaneous, TID AC, 1 Units at 12/26/19 0653 **AND** Fingerstick Glucose (POCT), , , TID AC, Alex Linder PA-C    insulin lispro (HumaLOG) 100 units/mL subcutaneous injection 1-5 Units, 1-5 Units, Subcutaneous, HS, Regina Crow PA-C, 1 Units at 12/24/19 2158    levalbuterol [facility]) inhalation solution 1 25 mg, 1 25 mg, Nebulization, TID, Aristides Orellana DO, 1 25 mg at 12/26/19 0811    metoprolol tartrate (LOPRESSOR) partial tablet 12 5 mg, 12 5 mg, Oral, Q12H [address], Sakshi Anderson MD, 12 5 mg at 12/26/19 0800    metroNIDAZOLE (FLAGYL) IVPB (premix) 500 mg, 500 mg, Intravenous, Q8H, Alona Weaver PA-C, Last Rate: 200 mL/hr at 12/26/19 0031, 500 mg at 12/26/19 0031    midodrine (PROAMATINE) tablet 5 mg, 5 mg, Oral, TID AC, Warden Bartolo MD, 5 mg at 12/26/19 0618    ondansetron (ZOFRAN) injection 4 mg, 4 mg, Intravenous, Q6H PRN, Regina Crow PA-C, 4 mg at 12/26/19 9088    oxyCODONE-acetaminophen (PERCOCET) 5-325 mg per tablet 1 tablet, 1 tablet, Oral, Q4H PRN, Regina Crow PA-C, 1 tablet at 12/25/19 2223    oxyCODONE-acetaminophen (PERCOCET) 5-325 mg per tablet 2 tablet, 2 tablet, Oral, Q6H PRN, Regina Crow PA-C, 2 tablet at 12/25/19 0602    pantoprazole (PROTONIX) EC tablet 40 mg, 40 mg, Oral, Daily, Alex Linder PA-C, 40 mg at 12/26/19 0800    polyethylene glycol (MIRALAX) packet 17 g, 17 g, Oral, Daily, Juno Linder PA-C, 17 g at 12/22/19 4564    sodium chloride 0 9 % inhalation solution 3 mL, 3 mL, Nebulization, TID, Deondre Ave, DO, 3 mL at 12/26/19 0811    temazepam (RESTORIL) capsule 15 mg, 15 mg, Oral, HS PRN, Marty Menchaca PA-C    Laboratory Results:  Results from last 7 days   Lab Units 12/26/19  0522 12/25/19  0699 12/24/19  0532 12/23/19  2301 12/23/19  0448 12/22/19  0543 12/21/19  0519 12/20/19  1504 12/20/19  0429   WBC Thousand/uL 26 38* 29 73* 23 82*  --  16 79* 16 09* 14 44*  --  12 22*   HEMOGLOBIN g/dL 11 7* 11 4* 11 7* 11 6* 11 6* 11 9* 10 9*  --  9 8*   HEMATOCRIT % 36 9 34 9* 36 2* 35 1* 35 6* 36 7 32 9*  --  29 8*   PLATELETS Thousands/uL 288 312 311  --  235 216 136*  --  103*   SODIUM mmol/L 133* 138 137  --  137 138 137  --  139   POTASSIUM mmol/L 4 8 4 7 4 9  --  4 6 4 1 3 4* 4 1 3 0*   CHLORIDE mmol/L 99* 101 100  --  102 103 101  --  101   CO2 mmol/L 23 28 27  --  28 28 28  --  28   BUN mg/dL 136* 108* 117*  --  142* 125* 129*  --  127*   CREATININE mg/dL 6 68* 5 14* 4 92*  --  5 15* 4 33* 4 38*  --  5 14*   CALCIUM mg/dL 7 6* 7 7* 8 0*  --  8 1* 8 1* 8 1*  --  7 6*   MAGNESIUM mg/dL  --   --   --   --  3 0*  --   --   --   --        IR thoracentesis   Final Result by Melly Corea MD (12/24 5899)   Impression:    Successful ultrasound-guided left thoracentesis  Workstation performed: [id number]         XR chest pa & lateral   Final Result by Becca Douglass MD (12/24 4457)      Increased size of left pleural effusion/underlying left basilar atelectasis or infiltrate  Workstation performed: [id number]         IR temp HD cath   Final Result by Neeru Maki MD (12/23 1919)   Impression:   Ultrasound and fluoroscopically guided placement of a 20 cm 14-Turkmen nontunneled dialysis catheter via the right internal jugular vein  Catheter is ready for immediate use  Workstation performed: [id number]         XR chest pa & lateral   Final Result by Antonette Olivo MD (12/20 1746)   Persistent small left basilar pleural effusion with lungs otherwise clear  Workstation performed: [id number]         XR chest portable   Final Result by Jessika Oro MD (92/54 2282)      Extubation  Mild bibasilar atelectasis  Expected appearance after cardiac surgery  Workstation performed: [id number]         XR chest portable ICU   Final Result by Kali Alegria MD (24/67 3954)      1  Status post sternotomy  Lines and support devices appear in position as noted above  Central fullness of the pulmonary vasculature may represent mild pulmonary vascular congestion  Workstation performed: [id number]         CTA dissection protocol chest abdomen pelvis w wo contrast   Final Result by Stephenie Flores MD (12/14 2300)      1  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 tapering to 4 1 cm at the aortic arch  2   There is crescentic intermediate density along the aortic wall extending from the aortic root through the aortic arch and along the proximal descending thoracic aorta compatible with acute type A intramural hematoma  Emergent vascular consultation    is recommended  I personally discussed this study with Tena Balbuena on 12/14/2019 at 10:57 PM                   Workstation performed: [id number]         XR chest 2 views   Final Result by Lani Dickey MD (12/15 9718)      Cardiomegaly   No acute consolidation or congestion            Workstation performed: [id number]         XR chest pa & lateral    (Results Pending)       Portions of the record may have been created with voice recognition software  Occasional wrong word or "sound a like" substitutions may have occurred due to the inherent limitations of voice recognition software  Read the chart carefully and recognize, using context, where substitutions have occurred

## 2019-12-26 NOTE — PROGRESS NOTES
Progress Note - Cardiothoracic Surgery   [facility] A Core 70 y o  male MRN: [id number]  Unit/Bed#: [facility] 406-01 Encounter: [id number]  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, Ascending aortic aneurysm     S/P Replacement of ascending aorta with aggressive hemiarch reconstruction and aortic valve resuspension with a 26 mm Dacron graft; POD # 11    24 Hour Events: Continues in PAF overnight  Sputum production easier this morning he states, continues with yellow/tan sputum production  Afebrile overnight  No further diarrhea over past 24 hours  Scheduled for HD today at 0830  Appetite is fair this morning  Denies any pain  Medications:   Scheduled Meds:  Current Facility-Administered Medications:  acetaminophen 650 mg Oral Q4H PRN Albertina Trotter PA-C    amiodarone 200 mg Oral [facility] [place], [place]    aspirin 81 mg Oral Daily Sumaya Perry PA-C    bisacodyl 10 mg Rectal Daily PRN Maxwell Linder PA-C    cefepime 1,000 mg Intravenous Q24H [place], [place] Last Rate: 1,000 mg (12/25/19 1002)   guaiFENesin 600 mg Oral Q12H [address] Sumaya Perry PA-C    insulin lispro 1-5 Units Subcutaneous TID AC Alex Linder PA-C    insulin lispro 1-5 Units Subcutaneous HS Alex Linder PA-C    levalbuterol 1 25 mg Nebulization TID Raffy Zepeda DO    metoprolol tartrate 12 5 mg Oral Q12H Andrés Ordonez, MD    metroNIDAZOLE 500 mg Intravenous [facility] [address] [place] [address] Last Rate: 500 mg (12/26/19 0031)   midodrine 5 mg Oral TID AC Mark Villarreal MD    ondansetron 4 mg Intravenous Q6H PRN Albertina Trotter PA-C    oxyCODONE-acetaminophen 1 tablet Oral Q4H PRN Albertina Trotter PA-C    oxyCODONE-acetaminophen 2 tablet Oral Q6H PRN Maxwell Linder PA-C    pantoprazole 40 mg Oral Daily Alex Linder PA-C    polyethylene glycol 17 g Oral Daily Alex Linder PA-C    sodium chloride 3 mL Nebulization TID Raffy Zepeda DO    temazepam 15 mg Oral HS PRN Maxwell Linder PA-C      Continuous Infusions:   PRN Meds:   acetaminophen    bisacodyl    ondansetron    oxyCODONE-acetaminophen    oxyCODONE-acetaminophen    temazepam    Vitals:   Vitals:    19 0358 19 0526 19 0742 19 0815   BP: 139/81  102/56    BP Location: Left arm  Left arm    Pulse: 79  65 71   Resp:     Temp: 98 8 °F (37 1 °C)  97 5 °F (36 4 °C)    TempSrc: Oral  Oral    SpO2: 95%  95% 95%   Weight:  79 4 kg (175 lb 1 6 oz)     Height:           Telemetry: NSR; Heart Rate: 65    Respiratory:   SpO2: SpO2: 95 %; 3 LPM    Intake/Output:   I/O       701 -  07 -  07 -  07    P  O  180 1110     I V  (mL/kg) 500 (6 4)      Total Intake(mL/kg) 680 (8 7) 1110 (14)     Urine (mL/kg/hr) 100 (0 1) 450 (0 2)     Other 2000      Total Output 2100 450     Net -1420 +660            Unmeasured Urine Occurrence 3 x 3 x     Unmeasured Stool Occurrence 3 x          Weights:   Weight (last 2 days)     Date/Time   Weight    19 0526   79 4 (175 1)    19 06   78 2 (172 4)    19 0600   80 2 (176 81)            Results:   Results from last 7 days   Lab Units 19  0522 19  0442 19  0532   WBC Thousand/uL 26 38* 29 73* 23 82*   HEMOGLOBIN g/dL 11 7* 11 4* 11 7*   HEMATOCRIT % 36 9 34 9* 36 2*   PLATELETS Thousands/uL 288 312 311     Results from last 7 days   Lab Units 19  0522 19  0442 19  0532   SODIUM mmol/L 133* 138 137   POTASSIUM mmol/L 4 8 4 7 4 9   CHLORIDE mmol/L 99* 101 100   CO2 mmol/L 23 28 27   BUN mg/dL 136* 108* 117*   CREATININE mg/dL 6 68* 5 14* 4 92*   CALCIUM mg/dL 7 6* 7 7* 8 0*     Results from last 7 days   Lab Units 19  0522 19  0658 19  0532   INR  3 02* 4 24* 2 92*   Coumadin dosin yesterday, 2 mg day prior and 1 mg prior     Point of care glucose: 121-279    Studies:  No new studies, awaiting CXR    Invasive Lines/Tubes:  Invasive Devices     Peripheral Intravenous Line            Peripheral IV 19 Dorsal (posterior); Right Hand 2 days          Hemodialysis Catheter            HD Temporary Double Catheter 2 days                Physical Exam:    HEENT/NECK:  PERRLA  No jugular venous distention  Cardiac: Regular rate and rhythm  Pulmonary:  rhonchi bilaterally, crackles at left base - overall improved from yesterday, some rhonchi clears with cough  Abdomen:  Non-tender, Non-distended and Normal bowel sounds  Incisions: Sternum is stable  Incision is clean, dry, and intact  Extremities: Extremities warm/dry and 1+ edema B/L  Neuro: Alert and oriented X 3  Skin: Warm/Dry, without rashes or lesions  Assessment:  Principal Problem:    Dissection of thoracic aorta (HCC)  Active Problems:    Benign essential hypertension    Abnormal TSH    Benign hypertension with CKD (chronic kidney disease) stage III (HCC)    Benign prostatic hyperplasia without lower urinary tract symptoms    S/P aorta repair    Leukocytosis    Thrombocytopenia (HCC)    Postoperative anemia due to acute blood loss    Anticoagulation goal of INR 2 to 3       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, Ascending aortic aneurysm     S/P Replacement of ascending aorta with aggressive hemiarch reconstruction and aortic valve resuspension with a 26 mm Dacron graft; POD # 11    Plan:    1  Cardiac:   Paroxysmal atrial fibrilaltion; HR/BP well-controlled  On coumadin, had vitamin K yesterday for elevated INR   Lopressor, 12 5mg PO BID   Midodrine 5 mg tid   Continue ASA and Statin therapy  Epicardial pacing wires out  Patient no longer has central IV access   Continue DVT prophylaxis    2  Pulmonary: presumed pneumonia, sputum cx preliminary report positive for GNR  On day 2 of cefepime/flagyl, wbc improved from yesterday  Remains afebrile  On aggressive pulmonary tolieting including guafenesin, nebs ATC  Acute post-op pulmonary insufficiency; Requiring 3 liters via nasal cannula, secondary to atelectasis, pleural effusion and pneumonia  Continue incentive spirometry/coughing/deep breathing exercises  Wean supplemental oxygen as tolerated for saturation > 90%  Chest tubes have been discontinued    3  Renal: HD catheter placed 12/23  AFSHIN with ATN, as noted in nephrology progress notes is: Treated and resolved Ongoing/Improving Started HD on 12/23  HD per nephrology       4  Neuro:  Neurologically intact; No active issues  Incisional pain well-controlled; Continue prn Percocet    5  GI:  Tolerating TLC 2 3 gm sodium diet  Maintain 1800 mL daily fluid restriction   Continue stool softeners and prn suppository  Continue GI prophylaxis    6  Endo:   No history of diabetes; Glucose well-controlled with sliding scale coverage    7    Hematology:    Post-operative blood count acceptable; Trend prn    8  Disposition:      Follow daily PT/OT recommendations regarding home vs  rehab when medically cleared for discharge    VTE Pharmacologic Prophylaxis: Heparin  VTE Mechanical Prophylaxis: sequential compression device    Collaborative rounds completed with CHIDI Ventura    Plan of care discussed with bedside nurse    SIGNATURE: Lorenza Nunes PA-C  DATE: December 26, 2019  TIME: 8:24 AM

## 2019-12-26 NOTE — PROGRESS NOTES
Progress Note - Cardiology   Jose SEBASTIAN Core 70 y o  male MRN: [id number]  Unit/Bed#: [facility] 406-01 Encounter: [id number]    Assessment:  Principal Problem:    Dissection of thoracic aorta (HCC)  Active Problems:    Benign essential hypertension    Abnormal TSH    Benign hypertension with CKD (chronic kidney disease) stage III (HCC)    Benign prostatic hyperplasia without lower urinary tract symptoms    S/P aorta repair    Leukocytosis    Thrombocytopenia (HCC)    Postoperative anemia due to acute blood loss    Anticoagulation goal of INR 2 to 3  Type A dissection, s/p repair  Resuspension of aortic valve  PAF, at this time, back in afib  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  Hypotension, on midodrine  Plan:    Anticoagulation with warfarin, when in afib, rate controlled  Currently back in sinus  On HD   Midodrine for hypotension  Antibiotics per CT surgery  Subjective/Objective     Subjective:  Continues with HD  Now back in sinus rhythm  Objective:    Vitals: /61 (BP Location: Right arm)   Pulse 78   Temp 97 8 °F (36 6 °C) (Oral)   Resp 20   Ht 5' 9" (1 753 m)   Wt 79 4 kg (175 lb 1 6 oz)   SpO2 92%   BMI 25 86 kg/m²   Vitals:    12/25/19 0600 12/26/19 0526   Weight: 78 2 kg (172 lb 6 4 oz) 79 4 kg (175 lb 1 6 oz)     Orthostatic Blood Pressures      Most Recent Value   Blood Pressure  122/61 filed at 12/26/2019 1323   Patient Position - Orthostatic VS  Lying filed at 12/26/2019 1323          Intake/Output Summary (Last 24 hours) at 12/26/2019 1413  Last data filed at 12/26/2019 1230  Gross per 24 hour   Intake 1350 ml   Output 1300 ml   Net 50 ml     Physical Exam:  GEN: Smooth Muñoz is an elderly man, laying in bed   NECK: supple, no carotid bruits   HEART: regular rhythm, normal S1 and S2, no murmurs,   LUNGS: Clear to auscultation     ABDOMEN: normal bowel sounds, soft, no tenderness, no distention  EXTREMITIES: peripheral pulses normal; no clubbing, cyanosis, or edema  SKIN: normal without suspicious lesions on exposed skin    Medications:    Current Facility-Administered Medications:     acetaminophen (TYLENOL) tablet 650 mg, 650 mg, Oral, Q4H PRN, Pablo Julio PA-C    amiodarone tablet 200 mg, 200 mg, Oral, Q8H [address], Alex Linder PA-C, 200 mg at 12/26/19 1309    aspirin chewable tablet 81 mg, 81 mg, Oral, Daily, Sumaya Perry PA-C, 81 mg at 12/26/19 0800    bisacodyl (DULCOLAX) rectal suppository 10 mg, 10 mg, Rectal, Daily PRN, Caitlin Linder PA-C    cefepime (MAXIPIME) 1,000 mg in dextrose 5 % 50 mL IVPB, 1,000 mg, Intravenous, Q24H, Mita Lopez PA-C, Last Rate: 100 mL/hr at 12/26/19 1402, 1,000 mg at 12/26/19 1402    guaiFENesin (MUCINEX) 12 hr tablet 600 mg, 600 mg, Oral, Q12H [address], Sumaya Perry PA-C, 600 mg at 12/26/19 0800    insulin lispro (HumaLOG) 100 units/mL subcutaneous injection 1-5 Units, 1-5 Units, Subcutaneous, TID AC, 1 Units at 12/26/19 0653 **AND** Fingerstick Glucose (POCT), , , TID AC, Alex Linder PA-C    insulin lispro (HumaLOG) 100 units/mL subcutaneous injection 1-5 Units, 1-5 Units, Subcutaneous, HS, Pablo Julio PA-C, 1 Units at 12/24/19 2158    levalbuterol [facility]) inhalation solution 1 25 mg, 1 25 mg, Nebulization, TID, Krish Capellan DO, 1 25 mg at 12/26/19 1329    metoprolol tartrate (LOPRESSOR) partial tablet 12 5 mg, 12 5 mg, Oral, Q12H [address], Dino Gaines MD, 12 5 mg at 12/26/19 0800    metroNIDAZOLE (FLAGYL) IVPB (premix) 500 mg, 500 mg, Intravenous, Q8H, Mita Weaver PA-C, Last Rate: 200 mL/hr at 12/26/19 1309, 500 mg at 12/26/19 1309    midodrine (PROAMATINE) tablet 5 mg, 5 mg, Oral, TID AC, Mark Larson MD, 5 mg at 12/26/19 1309    ondansetron (ZOFRAN) injection 4 mg, 4 mg, Intravenous, Q6H PRN, Pablo Julio PA-C, 4 mg at 12/26/19 0268    oxyCODONE-acetaminophen (PERCOCET) 5-325 mg per tablet 1 tablet, 1 tablet, Oral, Q4H PRN, Pablo Julio PA-C, 1 tablet at 12/25/19 2223    oxyCODONE-acetaminophen (PERCOCET) 5-325 mg per tablet 2 tablet, 2 tablet, Oral, Q6H PRN, Marissa Garcia PA-C, 2 tablet at 12/25/19 0602    pantoprazole (PROTONIX) EC tablet 40 mg, 40 mg, Oral, Daily, Alex NEETU Linder PA-C, 40 mg at 12/26/19 0800    polyethylene glycol (MIRALAX) packet 17 g, 17 g, Oral, Daily, Jose Lee TIRNY Linder, 17 g at 12/22/19 0648    sodium chloride 0 9 % inhalation solution 3 mL, 3 mL, Nebulization, TID, Frederick Katayama, DO, 3 mL at 12/26/19 1329    temazepam (RESTORIL) capsule 15 mg, 15 mg, Oral, HS PRN, Marissa Garcia PA-C    Lab Results:      Results from last 7 days   Lab Units 12/26/19  0522 12/25/19  0442 12/24/19  0532   WBC Thousand/uL 26 38* 29 73* 23 82*   HEMOGLOBIN g/dL 11 7* 11 4* 11 7*   HEMATOCRIT % 36 9 34 9* 36 2*   PLATELETS Thousands/uL 288 312 311         Results from last 7 days   Lab Units 12/26/19  0522 12/25/19  1014 12/25/19  0442 12/24/19  0532   POTASSIUM mmol/L 4 8  --  4 7 4 9   CHLORIDE mmol/L 99*  --  101 100   CO2 mmol/L 23  --  28 27   BUN mg/dL 136*  --  108* 117*   CREATININE mg/dL 6 68*  --  5 14* 4 92*   CALCIUM mg/dL 7 6*  --  7 7* 8 0*   ALK PHOS U/L  --  168*  --   --    ALT U/L  --  26  --   --    AST U/L  --  66*  --   --      Results from last 7 days   Lab Units 12/26/19  0522 12/25/19  0658 12/24/19  0532   INR  3 02* 4 24* 2 92*     Results from last 7 days   Lab Units 12/23/19  0448   MAGNESIUM mg/dL 3 0*       Telemetry: Personally reviewed  Back in sinus rhythm, paroxysmal in afib  DYLAN 2/15/19:  LEFT VENTRICLE:  Systolic Function: normal  Ejection Fraction: 65%  Cavity size: normal    RIGHT VENTRICLE:  Systolic Function: normal   Cavity size moderately dilated  Hypertrophy (severe LVH) present  AORTIC VALVE:  Leaflets: normal and trileaflet  Leaflet motions normal and normal  Stenosis: none  Regurgitation: trace  MITRAL VALVE:  Leaflets: calcified and normal  Leaflet Motions: normal  Regurgitation: none  Stenosis: none     TRICUSPID VALVE:  Leaflets: normal, annulus severely dilated and dilate annulus  Leaflet Motions: restricted  Stenosis: none  Regurgitation: moderate  PULMONIC VALVE:  Leaflets: normal  Regurgitation: none  Stenosis: none  ASCENDING AORTA:  Size:  normal  Dissection present  AORTIC ARCH:  Size:  normal    OTHER AORTIC FINDINGS:   Hematoma noted that extends from distal arch to mid descending aorta, 0 7 cm in thickness  RIGHT ATRIUM:  Size:  dilated  No spontaneous echo contrast   LEFT ATRIUM:  Size: normal    LEFT ATRIAL APPENDAGE:  Size: normal    ATRIAL SEPTUM:  Intra-atrial septal morphology: normal     VENTRICULAR SEPTUM:  Intra-ventricular septum morphology: normal    OTHER FINDINGS:  Pericardium:  normal  Pleural Effusion:  none  POSTPROCEDURE  LEFT VENTRICLE: Unchanged   RIGHT VENTRICLE:   Systolic Function: mildly depressed  AORTIC VALVE:   Leaflets: native  Stenosis: none  Regurgitation: mild  MITRAL VALVE: Unchanged   TRICUSPID VALVE:   Leaflets: native  Regurgitation: systolic flow reversal in hepatic vein and severe  PULMONIC VALVE: Unchanged   ATRIA: Unchanged   AORTA:   OTHER AORTA FINDINGS: The proximal aorta has been replaced  The mural hematoma on the descending aorta is preserved  Satish Frey REMOVAL:  Probe Removal: atraumatic  ECHOCARDIOGRAM COMMENTS:  Noted is the absence of right coronary ostia  The left coronary ostia appears to have two vessels arising from it  Confirmed surgically  Satish Frey

## 2019-12-26 NOTE — PLAN OF CARE
Hemodialysis treatment ordered for 3 hours  Using 2 mEq/L dialysate solution for serum potassium 4 8 this morning  Fluid goal 1500 ml as tolerated by sbp >100

## 2019-12-27 ENCOUNTER — APPOINTMENT (INPATIENT)
Dept: RADIOLOGY | Facility: HOSPITAL | Age: 79
DRG: 219 | End: 2019-12-27
Payer: COMMERCIAL

## 2019-12-27 LAB
ANISOCYTOSIS BLD QL SMEAR: PRESENT
BACTERIA SPEC BFLD CULT: NO GROWTH
BACTERIA SPT RESP CULT: ABNORMAL
BASOPHILS # BLD MANUAL: 0 THOUSAND/UL (ref 0–0.1)
BASOPHILS NFR MAR MANUAL: 0 % (ref 0–1)
EOSINOPHIL # BLD MANUAL: 0 THOUSAND/UL (ref 0–0.4)
EOSINOPHIL NFR BLD MANUAL: 0 % (ref 0–6)
ERYTHROCYTE [DISTWIDTH] IN BLOOD BY AUTOMATED COUNT: 14.2 % (ref 11.6–15.1)
GLUCOSE SERPL-MCNC: 113 MG/DL (ref 65–140)
GLUCOSE SERPL-MCNC: 118 MG/DL (ref 65–140)
GLUCOSE SERPL-MCNC: 121 MG/DL (ref 65–140)
GLUCOSE SERPL-MCNC: 125 MG/DL (ref 65–140)
GRAM STN SPEC: ABNORMAL
GRAM STN SPEC: ABNORMAL
GRAM STN SPEC: NORMAL
GRAM STN SPEC: NORMAL
HCT VFR BLD AUTO: 29.9 % (ref 36.5–49.3)
HGB BLD-MCNC: 9.9 G/DL (ref 12–17)
INR PPP: 1.87 (ref 0.84–1.19)
LYMPHOCYTES # BLD AUTO: 0.62 THOUSAND/UL (ref 0.6–4.47)
LYMPHOCYTES # BLD AUTO: 2 % (ref 14–44)
MCH RBC QN AUTO: 30.9 PG (ref 26.8–34.3)
MCHC RBC AUTO-ENTMCNC: 33.1 G/DL (ref 31.4–37.4)
MCV RBC AUTO: 93 FL (ref 82–98)
MONOCYTES # BLD AUTO: 1.24 THOUSAND/UL (ref 0–1.22)
MONOCYTES NFR BLD: 4 % (ref 4–12)
NEUTROPHILS # BLD MANUAL: 29.12 THOUSAND/UL (ref 1.85–7.62)
NEUTS SEG NFR BLD AUTO: 94 % (ref 43–75)
NRBC BLD AUTO-RTO: 0 /100 WBCS
PLATELET # BLD AUTO: 236 THOUSANDS/UL (ref 149–390)
PLATELET BLD QL SMEAR: ADEQUATE
PMV BLD AUTO: 10.5 FL (ref 8.9–12.7)
PROTHROMBIN TIME: 21 SECONDS (ref 11.6–14.5)
RBC # BLD AUTO: 3.2 MILLION/UL (ref 3.88–5.62)
RBC MORPH BLD: PRESENT
WBC # BLD AUTO: 30.98 THOUSAND/UL (ref 4.31–10.16)

## 2019-12-27 PROCEDURE — 97116 GAIT TRAINING THERAPY: CPT

## 2019-12-27 PROCEDURE — 76770 US EXAM ABDO BACK WALL COMP: CPT

## 2019-12-27 PROCEDURE — 32555 ASPIRATE PLEURA W/ IMAGING: CPT | Performed by: RADIOLOGY

## 2019-12-27 PROCEDURE — 85027 COMPLETE CBC AUTOMATED: CPT | Performed by: PHYSICIAN ASSISTANT

## 2019-12-27 PROCEDURE — 99232 SBSQ HOSP IP/OBS MODERATE 35: CPT | Performed by: INTERNAL MEDICINE

## 2019-12-27 PROCEDURE — 94640 AIRWAY INHALATION TREATMENT: CPT

## 2019-12-27 PROCEDURE — 82948 REAGENT STRIP/BLOOD GLUCOSE: CPT

## 2019-12-27 PROCEDURE — 94760 N-INVAS EAR/PLS OXIMETRY 1: CPT

## 2019-12-27 PROCEDURE — 85610 PROTHROMBIN TIME: CPT | Performed by: PHYSICIAN ASSISTANT

## 2019-12-27 PROCEDURE — 71046 X-RAY EXAM CHEST 2 VIEWS: CPT

## 2019-12-27 PROCEDURE — 87205 SMEAR GRAM STAIN: CPT | Performed by: PHYSICIAN ASSISTANT

## 2019-12-27 PROCEDURE — 99233 SBSQ HOSP IP/OBS HIGH 50: CPT | Performed by: INTERNAL MEDICINE

## 2019-12-27 PROCEDURE — 97110 THERAPEUTIC EXERCISES: CPT

## 2019-12-27 PROCEDURE — 99223 1ST HOSP IP/OBS HIGH 75: CPT | Performed by: INTERNAL MEDICINE

## 2019-12-27 PROCEDURE — 99024 POSTOP FOLLOW-UP VISIT: CPT | Performed by: THORACIC SURGERY (CARDIOTHORACIC VASCULAR SURGERY)

## 2019-12-27 PROCEDURE — 94664 DEMO&/EVAL PT USE INHALER: CPT

## 2019-12-27 PROCEDURE — 74177 CT ABD & PELVIS W/CONTRAST: CPT

## 2019-12-27 PROCEDURE — 0W9B3ZZ DRAINAGE OF LEFT PLEURAL CAVITY, PERCUTANEOUS APPROACH: ICD-10-PCS | Performed by: RADIOLOGY

## 2019-12-27 PROCEDURE — 85007 BL SMEAR W/DIFF WBC COUNT: CPT | Performed by: PHYSICIAN ASSISTANT

## 2019-12-27 PROCEDURE — 87070 CULTURE OTHR SPECIMN AEROBIC: CPT | Performed by: PHYSICIAN ASSISTANT

## 2019-12-27 PROCEDURE — 71260 CT THORAX DX C+: CPT

## 2019-12-27 PROCEDURE — 94668 MNPJ CHEST WALL SBSQ: CPT

## 2019-12-27 PROCEDURE — 32555 ASPIRATE PLEURA W/ IMAGING: CPT

## 2019-12-27 RX ORDER — MIDODRINE HYDROCHLORIDE 5 MG/1
10 TABLET ORAL
Status: DISCONTINUED | OUTPATIENT
Start: 2019-12-27 | End: 2020-01-06

## 2019-12-27 RX ORDER — ONDANSETRON 2 MG/ML
4 INJECTION INTRAMUSCULAR; INTRAVENOUS EVERY 6 HOURS
Status: DISCONTINUED | OUTPATIENT
Start: 2019-12-27 | End: 2020-01-07 | Stop reason: HOSPADM

## 2019-12-27 RX ORDER — METOCLOPRAMIDE HYDROCHLORIDE 5 MG/ML
10 INJECTION INTRAMUSCULAR; INTRAVENOUS EVERY 6 HOURS SCHEDULED
Status: DISCONTINUED | OUTPATIENT
Start: 2019-12-27 | End: 2019-12-28

## 2019-12-27 RX ORDER — FENTANYL CITRATE 50 UG/ML
25 INJECTION, SOLUTION INTRAMUSCULAR; INTRAVENOUS EVERY 2 HOUR PRN
Status: DISCONTINUED | OUTPATIENT
Start: 2019-12-27 | End: 2019-12-28

## 2019-12-27 RX ADMIN — MIDODRINE HYDROCHLORIDE 5 MG: 5 TABLET ORAL at 06:56

## 2019-12-27 RX ADMIN — ONDANSETRON 4 MG: 2 INJECTION INTRAMUSCULAR; INTRAVENOUS at 06:16

## 2019-12-27 RX ADMIN — AMIODARONE HYDROCHLORIDE 200 MG: 200 TABLET ORAL at 06:56

## 2019-12-27 RX ADMIN — LEVALBUTEROL HYDROCHLORIDE 1.25 MG: 1.25 SOLUTION, CONCENTRATE RESPIRATORY (INHALATION) at 13:16

## 2019-12-27 RX ADMIN — METOCLOPRAMIDE 10 MG: 5 INJECTION, SOLUTION INTRAMUSCULAR; INTRAVENOUS at 17:27

## 2019-12-27 RX ADMIN — IODIXANOL 100 ML: 320 INJECTION, SOLUTION INTRAVASCULAR at 16:28

## 2019-12-27 RX ADMIN — ASPIRIN 81 MG 81 MG: 81 TABLET ORAL at 08:10

## 2019-12-27 RX ADMIN — METRONIDAZOLE 500 MG: 500 INJECTION, SOLUTION INTRAVENOUS at 04:58

## 2019-12-27 RX ADMIN — MIDODRINE HYDROCHLORIDE 5 MG: 5 TABLET ORAL at 12:26

## 2019-12-27 RX ADMIN — GUAIFENESIN 600 MG: 600 TABLET, EXTENDED RELEASE ORAL at 08:10

## 2019-12-27 RX ADMIN — LEVALBUTEROL HYDROCHLORIDE 1.25 MG: 1.25 SOLUTION, CONCENTRATE RESPIRATORY (INHALATION) at 19:42

## 2019-12-27 RX ADMIN — LEVALBUTEROL HYDROCHLORIDE 1.25 MG: 1.25 SOLUTION, CONCENTRATE RESPIRATORY (INHALATION) at 07:29

## 2019-12-27 RX ADMIN — MIDODRINE HYDROCHLORIDE 10 MG: 5 TABLET ORAL at 17:11

## 2019-12-27 RX ADMIN — METOCLOPRAMIDE 10 MG: 5 INJECTION, SOLUTION INTRAMUSCULAR; INTRAVENOUS at 23:37

## 2019-12-27 RX ADMIN — PANTOPRAZOLE SODIUM 40 MG: 40 TABLET, DELAYED RELEASE ORAL at 08:10

## 2019-12-27 RX ADMIN — CEFEPIME HYDROCHLORIDE 1000 MG: 1 INJECTION, POWDER, FOR SOLUTION INTRAMUSCULAR; INTRAVENOUS at 13:28

## 2019-12-27 RX ADMIN — ISODIUM CHLORIDE 3 ML: 0.03 SOLUTION RESPIRATORY (INHALATION) at 13:16

## 2019-12-27 RX ADMIN — GUAIFENESIN 600 MG: 600 TABLET, EXTENDED RELEASE ORAL at 21:40

## 2019-12-27 RX ADMIN — METRONIDAZOLE 500 MG: 500 INJECTION, SOLUTION INTRAVENOUS at 12:26

## 2019-12-27 RX ADMIN — ISODIUM CHLORIDE 3 ML: 0.03 SOLUTION RESPIRATORY (INHALATION) at 07:29

## 2019-12-27 RX ADMIN — ISODIUM CHLORIDE 3 ML: 0.03 SOLUTION RESPIRATORY (INHALATION) at 19:42

## 2019-12-27 RX ADMIN — AMIODARONE HYDROCHLORIDE 200 MG: 200 TABLET ORAL at 13:25

## 2019-12-27 RX ADMIN — ONDANSETRON 4 MG: 2 INJECTION INTRAMUSCULAR; INTRAVENOUS at 12:26

## 2019-12-27 RX ADMIN — FENTANYL CITRATE 25 MCG: 50 INJECTION, SOLUTION INTRAMUSCULAR; INTRAVENOUS at 17:27

## 2019-12-27 NOTE — SEDATION DOCUMENTATION
500cc serosanguineous drainage  Lab sent  Left Site CDI  Pt to xray post  Pt had some nausea/vomitting before procedure, symptoms have resolved   Report called to SKINNY Augustin

## 2019-12-27 NOTE — PROGRESS NOTES
Progress Note - Cardiology   Yfn SEBASTIAN [facility] 70 y o  male MRN: [id number]  Unit/Bed#: [facility] 406-01 Encounter: [id number]    Assessment:  Principal Problem:    Dissection of thoracic aorta (HCC)  Active Problems:    Benign essential hypertension    Abnormal TSH    Benign hypertension with CKD (chronic kidney disease) stage III (HCC)    Benign prostatic hyperplasia without lower urinary tract symptoms    S/P aorta repair    Leukocytosis    Thrombocytopenia (HCC)    Postoperative anemia due to acute blood loss    Anticoagulation goal of INR 2 to 3  Type A dissection, s/p repair  Resuspension of aortic valve  PAF, at this time, back in afib  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  Hypotension, on midodrine  Plan:    Anticoagulation with warfarin, INR slightly subtherapeutic  When in afib, rate controlled  Continue low dose metoprolol  On HD  Midodrine for hypotension  Antibiotics per CT surgery for pneumonia  Subjective/Objective     Subjective:  Paroxysmal in afib, but when in afib, his heart rates are controlled  On anticoagulation with warfarin  Getting HD, BP intermittently low  Objective:    Vitals: BP (!) 86/51 (BP Location: Left arm)   Pulse 88   Temp 98 °F (36 7 °C) (Oral)   Resp 18   Ht 5' 9" (1 753 m)   Wt 79 9 kg (176 lb 1 6 oz)   SpO2 94%   BMI 26 01 kg/m²   Vitals:    12/26/19 0526 12/27/19 0600   Weight: 79 4 kg (175 lb 1 6 oz) 79 9 kg (176 lb 1 6 oz)     Orthostatic Blood Pressures      Most Recent Value   Blood Pressure  (!) 86/51 filed at 12/27/2019 0751   Patient Position - Orthostatic VS  Sitting filed at 12/27/2019 0751          Intake/Output Summary (Last 24 hours) at 12/27/2019 0901  Last data filed at 12/27/2019 6699  Gross per 24 hour   Intake 790 ml   Output 850 ml   Net -60 ml     Physical Exam:  GEN: Chang Diaz is an elderly man, laying in recliner  HEENT: pupils equal, round, and reactive to light; extraocular muscles intact  NECK: TLC  HEART: irregular  LUNGS: Slightly coarse     ABDOMEN: normal bowel sounds, soft, no tenderness, no distention  EXTREMITIES: Treace edema  SKIN: normal without suspicious lesions on exposed skin    Medications:    Current Facility-Administered Medications:     acetaminophen (TYLENOL) tablet 650 mg, 650 mg, Oral, Q4H PRN, Sander Anand PA-C    amiodarone tablet 200 mg, 200 mg, Oral, Q8H [address], Sugey Linder PA-C, 200 mg at 12/27/19 9668    aspirin chewable tablet 81 mg, 81 mg, Oral, Daily, Sumaya Perry PA-C, 81 mg at 12/27/19 0810    bisacodyl (DULCOLAX) rectal suppository 10 mg, 10 mg, Rectal, Daily PRN, Sugey Linder PA-C    cefepime (MAXIPIME) 1,000 mg in dextrose 5 % 50 mL IVPB, 1,000 mg, Intravenous, Q24H, Tiesha Wakefield PA-C, Stopped at 12/26/19 1432    guaiFENesin (MUCINEX) 12 hr tablet 600 mg, 600 mg, Oral, Q12H [address], Sumaya Perry PA-C, 600 mg at 12/27/19 0810    insulin lispro (HumaLOG) 100 units/mL subcutaneous injection 1-5 Units, 1-5 Units, Subcutaneous, TID AC, 1 Units at 12/26/19 0653 **AND** Fingerstick Glucose (POCT), , , TID AC, Alex Linder PA-C    insulin lispro (HumaLOG) 100 units/mL subcutaneous injection 1-5 Units, 1-5 Units, Subcutaneous, HS, Sander Anand PA-C, 1 Units at 12/26/19 2231    levalbuterol Sharee Dana) inhalation solution 1 25 mg, 1 25 mg, Nebulization, TID, Kevin Jarquin DO, 1 25 mg at 12/27/19 0729    metoprolol tartrate (LOPRESSOR) partial tablet 12 5 mg, 12 5 mg, Oral, Q12H [address], Mikki Stover MD, 12 5 mg at 12/26/19 2223    metroNIDAZOLE (FLAGYL) IVPB (premix) 500 mg, 500 mg, Intravenous, Q8H, Tiesha Weaver PA-C, Last Rate: 200 mL/hr at 12/27/19 0458, 500 mg at 12/27/19 0458    midodrine (PROAMATINE) tablet 5 mg, 5 mg, Oral, TID AC, Blessing Huitron MD, 5 mg at 12/27/19 0656    ondansetron (ZOFRAN) injection 4 mg, 4 mg, Intravenous, Q6H, Tiesha Wakefield PA-C    oxyCODONE-acetaminophen (PERCOCET) 5-325 mg per tablet 1 tablet, 1 tablet, Oral, Q4H PRN, Sander Anand PA-C, 1 tablet at 12/25/19 4734   oxyCODONE-acetaminophen (PERCOCET) 5-325 mg per tablet 2 tablet, 2 tablet, Oral, Q6H PRN, Albertina Trotter PA-C, 2 tablet at 12/26/19 1432    pantoprazole (PROTONIX) EC tablet 40 mg, 40 mg, Oral, Daily, Alex Linder PA-C, 40 mg at 12/27/19 0810    polyethylene glycol (MIRALAX) packet 17 g, 17 g, Oral, Daily, Maxwell Malgorzata Linder PA-C, 17 g at 12/22/19 6018    sodium chloride 0 9 % inhalation solution 3 mL, 3 mL, Nebulization, TID, Raffy Zepeda DO, 3 mL at 12/27/19 0729    temazepam (RESTORIL) capsule 15 mg, 15 mg, Oral, HS PRN, Albertina Trotter PA-C    Lab Results:      Results from last 7 days   Lab Units 12/27/19  0516 12/26/19  0522 12/25/19  0442   WBC Thousand/uL 30 98* 26 38* 29 73*   HEMOGLOBIN g/dL 9 9* 11 7* 11 4*   HEMATOCRIT % 29 9* 36 9 34 9*   PLATELETS Thousands/uL 236 288 312         Results from last 7 days   Lab Units 12/26/19  0522 12/25/19  1014 12/25/19  0442 12/24/19  0532   POTASSIUM mmol/L 4 8  --  4 7 4 9   CHLORIDE mmol/L 99*  --  101 100   CO2 mmol/L 23  --  28 27   BUN mg/dL 136*  --  108* 117*   CREATININE mg/dL 6 68*  --  5 14* 4 92*   CALCIUM mg/dL 7 6*  --  7 7* 8 0*   ALK PHOS U/L  --  168*  --   --    ALT U/L  --  26  --   --    AST U/L  --  66*  --   --      Results from last 7 days   Lab Units 12/27/19  0516 12/26/19  0522 12/25/19  0658   INR  1 87* 3 02* 4 24*     Results from last 7 days   Lab Units 12/23/19  0448   MAGNESIUM mg/dL 3 0*       Telemetry: Personally reviewed  Paroxysmal in atrial fibrillation, currently in afib  DYLAN 2/15/19:  LEFT VENTRICLE:  Systolic Function: normal  Ejection Fraction: 65%  Cavity size: normal    RIGHT VENTRICLE:  Systolic Function: normal   Cavity size moderately dilated  Hypertrophy (severe LVH) present  AORTIC VALVE:  Leaflets: normal and trileaflet  Leaflet motions normal and normal  Stenosis: none  Regurgitation: trace  MITRAL VALVE:  Leaflets: calcified and normal  Leaflet Motions: normal  Regurgitation: none  Stenosis: none     TRICUSPID VALVE:  Leaflets: normal, annulus severely dilated and dilate annulus  Leaflet Motions: restricted  Stenosis: none  Regurgitation: moderate  PULMONIC VALVE:  Leaflets: normal  Regurgitation: none  Stenosis: none  ASCENDING AORTA:  Size:  normal  Dissection present  AORTIC ARCH:  Size:  normal    OTHER AORTIC FINDINGS:   Hematoma noted that extends from distal arch to mid descending aorta, 0 7 cm in thickness  RIGHT ATRIUM:  Size:  dilated  No spontaneous echo contrast   LEFT ATRIUM:  Size: normal    LEFT ATRIAL APPENDAGE:  Size: normal    ATRIAL SEPTUM:  Intra-atrial septal morphology: normal     VENTRICULAR SEPTUM:  Intra-ventricular septum morphology: normal    OTHER FINDINGS:  Pericardium:  normal  Pleural Effusion:  none  POSTPROCEDURE  LEFT VENTRICLE: Unchanged   RIGHT VENTRICLE:   Systolic Function: mildly depressed  AORTIC VALVE:   Leaflets: native  Stenosis: none  Regurgitation: mild  MITRAL VALVE: Unchanged   TRICUSPID VALVE:   Leaflets: native  Regurgitation: systolic flow reversal in hepatic vein and severe  PULMONIC VALVE: Unchanged   ATRIA: Unchanged   AORTA:   OTHER AORTA FINDINGS: The proximal aorta has been replaced  The mural hematoma on the descending aorta is preserved  Jasmine Vines REMOVAL:  Probe Removal: atraumatic  ECHOCARDIOGRAM COMMENTS:  Noted is the absence of right coronary ostia  The left coronary ostia appears to have two vessels arising from it  Confirmed surgically  Jasmine Vines

## 2019-12-27 NOTE — PROGRESS NOTES
NEPHROLOGY PROGRESS NOTE   Oliver Sharpe 70 y o  male MRN: [id number]  Unit/Bed#: [facility] 406-01 Encounter: [id number]  Reason for Consult: AFSHIN/CKD    ASSESSMENT AND PLAN:  Afshin on CKD stage 3, baseline creatinine 1 4 to 1 8  -AFSHIN suspected to be secondary to postop ATN/contrast nephropathy  -creatinine on admission 1 8 progressively worsened to 5 1 and started on IHD on 12/23/19  Status post three session of HD  Will do next HD tomorrow  -BMP tomorrow a m   -continue to monitor intake and output, urine output remains poor  -CT scan this month showed atrophic right kidney, scattered areas of cortical thinning/scarring in the left kidney  -prior renal ultrasound in 2017 shows right kidney atrophy 7 8 cm, left kidney 9 7 cm with normal echogenicity   -check repeat renal ultrasound for better assessment of echogenicity, kidney size  -urine output remains poor although? Accurate  -bladder scan nonsignificant on 12/26/19     Currently has temporary HD catheter and may need to change to PermCath if continued to require dialysis  with ongoing leukocytosis, will continue with temporary catheter for now   -blood culture results to follow     Severe azotemia with concern for uremic symptoms  -continue to monitor with ongoing dialysis  still has nausea/vomiting issues  Has fair appetite   -continue to closely monitor     CKD anemia, hemoglobin to closely monitor, transfuse p r n  For hemoglobin less than seven  -check FOBT (not done yet)  -check iron studies once infection issues are resolved      Hypotension, currently remains on midodrine, consider increasing midodrine to 10 mg p o  T i d , most recent DYLAN on 12/15/19 shows normal pericardium   -?  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, 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  Jenny Hutson CT surgery follow-up      Leukocytosis, empiric antibiotic as per primary team, chest x-ray shows left pleural effusion with underlying atelectasis versus infiltrate  Id evaluation pending  Hypoxic respiratory failure, currently requiring 4 L oxygen via nasal cannula, consider Lasix trial     Left-sided pleural effusion, status post thoracentesis, 1 5 L fluid removed on 12/24/19     Discussed above plan in detail with primary team    SUBJECTIVE:  Patient seen and examined at bedside  Patient feels tired, has very poor appetite, continued to have nausea/vomiting issues  Blood pressure remains lower  Urine output poor       OBJECTIVE:  Current Weight: Weight - Scale: 79 9 kg (176 lb 1 6 oz)  Vitals:    12/27/19 1122   BP: 101/59   Pulse: 85   Resp: 18   Temp: (!) 97 3 °F (36 3 °C)   SpO2: 90%       Intake/Output Summary (Last 24 hours) at 12/27/2019 1224  Last data filed at 12/27/2019 0839  Gross per 24 hour   Intake 690 ml   Output 850 ml   Net -160 ml     Wt Readings from Last 3 Encounters:   12/27/19 79 9 kg (176 lb 1 6 oz)   04/24/19 84 5 kg (186 lb 4 6 oz)   04/23/19 87 7 kg (193 lb 6 4 oz)     Temp Readings from Last 3 Encounters:   12/27/19 (!) 97 3 °F (36 3 °C) (Oral)   04/25/19 97 9 °F (36 6 °C) (Oral)   04/23/19 (!) 97 °F (36 1 °C)     BP Readings from Last 3 Encounters:   12/27/19 101/59   04/25/19 137/73   04/23/19 138/92     Pulse Readings from Last 3 Encounters:   12/27/19 85   04/25/19 81   04/23/19 (!) 46        Physical Examination:  General:  Sitting in bed, no acute distress   Eyes:  Mild conjunctival pallor present  ENT:  External examination of ears and nose unremarkable  Neck:  No obvious lymphadenopathy appreciated  Respiratory:  Decreased breath sound at bases  CVS:  S1, S2 present  GI:  Soft, nontender, nondistended  CNS:  Active, alert, oriented x3  Extremities:  No significant edema in legs  Skin:  No new rash in legs    Medications:    Current Facility-Administered Medications:     acetaminophen (TYLENOL) tablet 650 mg, 650 mg, Oral, Q4H PRN, Marissa Garcia PA-C    amiodarone tablet 200 mg, 200 mg, Oral, Q8H [address], Daniel Gonzalez NEETU Linder PA-C, 200 mg at 12/27/19 9326    aspirin chewable tablet 81 mg, 81 mg, Oral, Daily, Sumaya Perry PA-C, 81 mg at 12/27/19 3317    bisacodyl (DULCOLAX) rectal suppository 10 mg, 10 mg, Rectal, Daily PRN, Argeins Wetzel PA-C    cefepime (MAXIPIME) 1,000 mg in dextrose 5 % 50 mL IVPB, 1,000 mg, Intravenous, Q24H, Ronel Lopez PA-C, Stopped at 12/26/19 1432    guaiFENesin (MUCINEX) 12 hr tablet 600 mg, 600 mg, Oral, Q12H [facility], Sumaya Perry PA-C, 600 mg at 12/27/19 0810    insulin lispro (HumaLOG) 100 units/mL subcutaneous injection 1-5 Units, 1-5 Units, Subcutaneous, TID AC, 1 Units at 12/26/19 0653 **AND** Fingerstick Glucose (POCT), , , TID AC, Alex Linder PA-C    insulin lispro (HumaLOG) 100 units/mL subcutaneous injection 1-5 Units, 1-5 Units, Subcutaneous, HS, Pete Fisher PA-C, 1 Units at 12/26/19 2231    levalbuterol [facility]) inhalation solution 1 25 mg, 1 25 mg, Nebulization, TID, Adriano France, DO, 1 25 mg at 12/27/19 0729    metoprolol tartrate (LOPRESSOR) partial tablet 12 5 mg, 12 5 mg, Oral, Q12H [facility], Zack Dean MD, 12 5 mg at 12/26/19 2223    metroNIDAZOLE (FLAGYL) IVPB (premix) 500 mg, 500 mg, Intravenous, Q8H, Jcakelin Araujo PA-C, Last Rate: 200 mL/hr at 12/27/19 0458, 500 mg at 12/27/19 0458    midodrine (PROAMATINE) tablet 5 mg, 5 mg, Oral, TID AC, Marie Soto MD, 5 mg at 12/27/19 0656    ondansetron (ZOFRAN) injection 4 mg, 4 mg, Intravenous, Q6H, Ronel Lopez PA-C    oxyCODONE-acetaminophen (PERCOCET) 5-325 mg per tablet 1 tablet, 1 tablet, Oral, Q4H PRN, SUE Chin-LYNNE, 1 tablet at 12/25/19 2223    oxyCODONE-acetaminophen (PERCOCET) 5-325 mg per tablet 2 tablet, 2 tablet, Oral, Q6H PRN, SUE Chin-LYNNE, 2 tablet at 12/26/19 1432    pantoprazole (PROTONIX) EC tablet 40 mg, 40 mg, Oral, Daily, Alex Linder PA-C, 40 mg at 12/27/19 0810    polyethylene glycol (MIRALAX) packet 17 g, 17 g, Oral, Daily, Alex Linder PA-C, 17 g at 12/22/19 0828    sodium chloride 0 9 % inhalation solution 3 mL, 3 mL, Nebulization, TID, Lorretta Maynard, DO, 3 mL at 12/27/19 0729    temazepam (RESTORIL) capsule 15 mg, 15 mg, Oral, HS PRN, Regina Crow PA-C    Laboratory Results:  Results from last 7 days   Lab Units 12/27/19  0516 12/26/19  0522 12/25/19  8043 12/24/19  0532 12/23/19  2301 12/23/19  0448 12/22/19  0543 12/21/19  0519  12/20/19  1504   WBC Thousand/uL 30 98* 26 38* 29 73* 23 82*  --  16 79* 16 09* 14 44*  --   --    HEMOGLOBIN g/dL 9 9* 11 7* 11 4* 11 7* 11 6* 11 6* 11 9* 10 9*   < >  --    HEMATOCRIT % 29 9* 36 9 34 9* 36 2* 35 1* 35 6* 36 7 32 9*   < >  --    PLATELETS Thousands/uL 236 288 312 311  --  235 216 136*  --   --    SODIUM mmol/L  --  133* 138 137  --  137 138 137  --   --    POTASSIUM mmol/L  --  4 8 4 7 4 9  --  4 6 4 1 3 4*  --  4 1   CHLORIDE mmol/L  --  99* 101 100  --  102 103 101  --   --    CO2 mmol/L  --  23 28 27  --  28 28 28  --   --    BUN mg/dL  --  136* 108* 117*  --  142* 125* 129*  --   --    CREATININE mg/dL  --  6 68* 5 14* 4 92*  --  5 15* 4 33* 4 38*  --   --    CALCIUM mg/dL  --  7 6* 7 7* 8 0*  --  8 1* 8 1* 8 1*  --   --    MAGNESIUM mg/dL  --   --   --   --   --  3 0*  --   --   --   --     < > = values in this interval not displayed  XR chest pa & lateral   Final Result by Nurys Snow MD (12/26 3970)      Status post thoracentesis, with diminished size of left effusion and no evidence of pneumothorax            Workstation performed: [id number]         IR thoracentesis   Final Result by Efrem Quintanilla MD (12/24 1622)   Impression:    Successful ultrasound-guided left thoracentesis  Workstation performed: [id number]         XR chest pa & lateral   Final Result by Nurys Snow MD (12/24 1433)      Increased size of left pleural effusion/underlying left basilar atelectasis or infiltrate              Workstation performed: [id number]         IR temp HD cath   Final Result by Arabella Parker MD (12/23 1919)   Impression:   Ultrasound and fluoroscopically guided placement of a 20 cm 14-[place] nontunneled dialysis catheter via the right internal jugular vein  Catheter is ready for immediate use  Workstation performed: [id number]         XR chest pa & lateral   Final Result by Alexa Nettles MD (12/20 9425)   Persistent small left basilar pleural effusion with lungs otherwise clear  Workstation performed: [id number]         XR chest portable   Final Result by Rashid Puga MD (91/76 9826)      Extubation  Mild bibasilar atelectasis  Expected appearance after cardiac surgery  Workstation performed: [id number]         XR chest portable ICU   Final Result by Kristofer Pederson MD (17/76 0291)      1  Status post sternotomy  Lines and support devices appear in position as noted above  Central fullness of the pulmonary vasculature may represent mild pulmonary vascular congestion  Workstation performed: [id number]         CTA dissection protocol chest abdomen pelvis w wo contrast   Final Result by Blue Herron MD (12/14 6687)      1  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 tapering to 4 1 cm at the aortic arch  2   There is crescentic intermediate density along the aortic wall extending from the aortic root through the aortic arch and along the proximal descending thoracic aorta compatible with acute type A intramural hematoma  Emergent vascular consultation    is recommended  I personally discussed this study with Jama Bass on 12/14/2019 at 10:57 PM                   Workstation performed: [id number]         XR chest 2 views   Final Result by Tre Kahn MD (12/15 4988)      Cardiomegaly   No acute consolidation or congestion            Workstation performed: [id number]         XR chest pa & lateral    (Results Pending)   IR thoracentesis    (Results Pending)       Portions of the record may have been created with voice recognition software  Occasional wrong word or "sound a like" substitutions may have occurred due to the inherent limitations of voice recognition software  Read the chart carefully and recognize, using context, where substitutions have occurred

## 2019-12-27 NOTE — BRIEF OP NOTE (RAD/CATH)
IR THORACENTESIS Procedure Note    PATIENT NAME: Kris SEBASTIAN Core  : 1940  MRN: [id number]    Pre-op Diagnosis:   1  Dissection of thoracic aorta ([address] )    2  S/P aorta repair    3  Intramural aortic hematoma ([address] )    4  Acute kidney injury superimposed on chronic kidney disease ([address] )    5  Abnormal sputum      Post-op Diagnosis:   1  Dissection of thoracic aorta ([address] )    2  S/P aorta repair    3  Intramural aortic hematoma ([address] )    4  Acute kidney injury superimposed on chronic kidney disease ([address] )    5  Abnormal sputum        Surgeon:   Ankit Perez MD  Assistants:     No qualified resident was available, Resident is only observing    Estimated Blood Loss:  Minimal    Findings:   Successful left thoracentesis      Specimens:  None    Complications:  None    Anesthesia: Local    Ankit Perez MD     Date: 2019  Time: 4:24 PM

## 2019-12-27 NOTE — PLAN OF CARE
Problem: PHYSICAL THERAPY ADULT  Goal: Performs mobility at highest level of function for planned discharge setting  See evaluation for individualized goals  Description  Treatment/Interventions: Functional transfer training, LE strengthening/ROM, Therapeutic exercise, Endurance training, Bed mobility, Gait training, Spoke to nursing, OT  Equipment Recommended: Walker(at this time)       See flowsheet documentation for full assessment, interventions and recommendations  Outcome: Not Progressing  Note:   Prognosis: Good  Problem List: Decreased strength, Decreased endurance, Impaired balance, Decreased mobility  Assessment: Pt seen for PT treatment session  Pt pleasant and agreeable to participate in therapy session  Pt continues to present with increased fatigue  At this time, pt requiring increased assist for ambulation 80 ft x 2 with RW  Pt unable to tolerate additional distance 2/2 fatigue  Pt performed LE therex as outlined above, requiring increased rest breaks between sets 2/2 fatigue  Pt left upright in bedside chair with all needs in reach  Pt will benefit from skilled therapy in order to address current impairments and functional limitations  PT to follow pt and recommending rehab vs HHPT pending progress  Barriers to Discharge: None     Recommendation: Other (Comment)(rehab vs HHPT pending progress)     PT - OK to Discharge: Yes(if to rehab, no if to home)    See flowsheet documentation for full assessment

## 2019-12-27 NOTE — PHYSICAL THERAPY NOTE
PHYSICAL THERAPY TREATMENT NOTE          Patient Name: Andreina Sharpe  Today's Date: 12/27/2019 12/27/19 1118   Pain Assessment   Pain Assessment No/denies pain   Pain Score No Pain   Restrictions/Precautions   Weight Bearing Precautions Per Order No   Braces or Orthoses   (none)   Other Precautions Cardiac/sternal;Fall Risk;Multiple lines;O2   General   Chart Reviewed Yes   Response to Previous Treatment Patient with no complaints from previous session  Family/Caregiver Present Yes   Cognition   Arousal/Participation Responsive   Attention Attends with cues to redirect   Orientation Level Oriented X4   Memory Within functional limits   Following Commands Follows one step commands with increased time or repetition   Comments Pt with increased fatigue today  Pt occasionally requires cues to redirction/attention to task  Subjective   Subjective "I am drained "   Bed Mobility   Additional Comments OOB in chair upon PT arrival   Pt left upright in bedside chair with all needs in reach at end of therapy session  Transfers   Sit to Stand 4  Minimal assistance   Additional items Assist x 1; Increased time required;Verbal cues;Armrests   Stand to Sit 4  Minimal assistance   Additional items Assist x 1; Increased time required;Verbal cues;Armrests   Additional Comments Transfers with RW  VC for hand placement and safety  Ambulation/Elevation   Gait pattern Short stride;Poor UE support; Improper Weight shift; Foward flexed; Excessively slow;Decreased foot clearance  (lateral sway L>R)   Gait Assistance 4  Minimal assist   Additional items Assist x 1;Verbal cues   Assistive Device Rolling walker   Distance 80 ft x 2   Stair Management Assistance Not tested   Balance   Static Sitting Fair +   Dynamic Sitting Fair   Static Standing Fair -   Dynamic Standing Poor +   Ambulatory Poor +   Endurance Deficit   Endurance Deficit Yes   Endurance Deficit Description fatigue   Activity Tolerance   Activity Tolerance Patient limited by fatigue   Nurse Made Aware RN cleared pt to be seen by PT   Exercises   Hip Flexion Sitting;15 reps;Bilateral  (x2)   Hip Abduction Sitting;15 reps;Bilateral  (x2)   Hip Adduction Sitting;15 reps;Bilateral  (x2)   Knee AROM Long Arc Quad Sitting;15 reps;Bilateral  (x2)   Ankle Pumps Sitting;15 reps;Bilateral  (x2)   Assessment   Prognosis Good   Problem List Decreased strength;Decreased endurance; Impaired balance;Decreased mobility   Assessment Pt seen for PT treatment session  Pt pleasant and agreeable to participate in therapy session  Pt continues to present with increased fatigue  At this time, pt requiring increased assist for ambulation 80 ft x 2 with RW  Pt unable to tolerate additional distance 2/2 fatigue  Pt performed LE therex as outlined above, requiring increased rest breaks between sets 2/2 fatigue  Pt left upright in bedside chair with all needs in reach  Pt will benefit from skilled therapy in order to address current impairments and functional limitations  PT to follow pt and recommending rehab vs HHPT pending progress  Goals   Patient Goals to go back to bed   STG Expiration Date 01/10/20   Short Term Goal #2 goals from initial evaluation remain appropriate with extended time frame   PT Treatment Day 4   Plan   Treatment/Interventions Functional transfer training;LE strengthening/ROM; Therapeutic exercise; Endurance training;Patient/family training;Equipment eval/education; Bed mobility;Gait training;Spoke to nursing   Progress Slow progress, decreased activity tolerance   PT Frequency Other (Comment)  (4-6x/wk)   Recommendation   Recommendation Other (Comment)  (rehab vs HHPT pending progress)   Equipment Recommended Walker   PT - OK to Discharge Yes  (if to rehab, no if to home)     Taisha Villatoro, PT, DPT

## 2019-12-27 NOTE — PROGRESS NOTES
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, Ascending aortic aneurysm     S/P Replacement of ascending aorta with aggressive hemiarch reconstruction and aortic valve resuspension with a 26 mm Dacron graft; POD # 12    24 Hour Events: nausea this morning  Easy bringing up sputum this morning  No fevers overnight, WBC to 30k today, on antibiotic therapy  No further diarrhea x 48 hours  Appetite is fair  Denies any pain  Ambulating with assistance  Had HD yesterday       Medications:   Scheduled Meds:  Current Facility-Administered Medications:  acetaminophen 650 mg Oral Q4H PRN Makenzie Bernal PA-C    amiodarone 200 mg Oral [facility] Alex DE ANDA [place], [place]    aspirin 81 mg Oral Daily Sumaya Perry PA-C    bisacodyl 10 mg Rectal Daily PRN Leann Linder PA-C    cefepime 1,000 mg Intravenous Q24H [place], [place] Last Rate: Stopped (12/26/19 1432)   guaiFENesin 600 mg Oral Q12H [address] Sumaya Perry PA-C    insulin lispro 1-5 Units Subcutaneous TID AC Alex Linder PA-C    insulin lispro 1-5 Units Subcutaneous HS Alex Linder PA-C    levalbuterol 1 25 mg Nebulization TID Cornel Tobias DO    metoprolol tartrate 12 5 mg Oral Q12H Andrés Ordonez MD    metroNIDAZOLE 500 mg Intravenous [facility] [address] [place] [address] Last Rate: 500 mg (12/27/19 1818)   midodrine 5 mg Oral TID AC Mark Larson MD    ondansetron 4 mg Intravenous Q6H Edda Riddle PA-C    oxyCODONE-acetaminophen 1 tablet Oral Q4H PRN Makenzie Bernal PA-C    oxyCODONE-acetaminophen 2 tablet Oral Q6H PRN Leann Linder PA-C    pantoprazole 40 mg Oral Daily Alex Linder PA-C    polyethylene glycol 17 g Oral Daily Alex Linder PA-C    sodium chloride 3 mL Nebulization TID Cornel Tobias DO    temazepam 15 mg Oral HS PRN Leann Linder PA-C      Continuous Infusions:   PRN Meds:   acetaminophen  Ramirez bisacodyl    oxyCODONE-acetaminophen    oxyCODONE-acetaminophen    temazepam    Vitals:   Vitals:    12/27/19 0600 12/27/19 0718 12/27/19 0730 12/27/19 0751   BP:  (!) 76/44  (!) 86/51   BP Location:  Left arm  Left arm   Pulse:  86 86 88   Resp:  18     Temp:  98 °F (36 7 °C)     TempSrc:  Oral     SpO2:  90% 94%    Weight: 79 9 kg (176 lb 1 6 oz)      Height:           Telemetry: Atrial Fibrillation; Heart Rate: 102    Respiratory:   SpO2: SpO2: 94 %; 3 LPM    Intake/Output:   I/O       12/25 0701 - 12/26 0700 12/26 0701 - 12/27 0700 12/27 0701 - 12/28 0700    P  O  1110 240 0    I V  (mL/kg)  500 (6 3)     Other  100     IV Piggyback  150     Total Intake(mL/kg) 1110 (14) 990 (12 4) 0 (0)    Urine (mL/kg/hr) 450 (0 2)      Other  850     Total Output 450 850     Net +660 +140 0           Unmeasured Urine Occurrence 3 x          Weights:   Weight (last 2 days)     Date/Time   Weight    12/27/19 0600   79 9 (176 1)    12/26/19 0526   79 4 (175 1)    12/25/19 0600   78 2 (172 4)            Results:   Results from last 7 days   Lab Units 12/27/19  0516 12/26/19  0522 12/25/19  0442   WBC Thousand/uL 30 98* 26 38* 29 73*   HEMOGLOBIN g/dL 9 9* 11 7* 11 4*   HEMATOCRIT % 29 9* 36 9 34 9*   PLATELETS Thousands/uL 236 288 312     Results from last 7 days   Lab Units 12/26/19  0522 12/25/19  0442 12/24/19  0532   SODIUM mmol/L 133* 138 137   POTASSIUM mmol/L 4 8 4 7 4 9   CHLORIDE mmol/L 99* 101 100   CO2 mmol/L 23 28 27   BUN mg/dL 136* 108* 117*   CREATININE mg/dL 6 68* 5 14* 4 92*   CALCIUM mg/dL 7 6* 7 7* 8 0*     Results from last 7 days   Lab Units 12/27/19  0516 12/26/19  0522 12/25/19  0658   INR  1 87* 3 02* 4 24*   Coumadin 0 mg x 2 days    Studies:  CXR IMPRESSION:     Status post thoracentesis, with diminished size of left effusion and no evidence of pneumothorax    I have personally reviewed pertinent reports        Invasive Lines/Tubes:  Invasive Devices     Peripheral Intravenous Line            Peripheral IV 12/26/19 Proximal;Right;Ventral (anterior) Forearm less than 1 day          Hemodialysis Catheter            HD Temporary Double Catheter 3 days                Physical Exam:    HEENT/NECK:  PERRLA  No jugular venous distention  Cardiac: Irregularly irregular rate and rhythm  Pulmonary:  rhonchi and crackles at bases bilaterally  Abdomen:  Non-tender, Non-distended and Normal bowel sounds  Incisions: Sternum is stable  Incision is clean, dry, and intact  Extremities: Extremities warm/dry and 1+ edema B/L  Neuro: Alert and oriented X 3  Skin: Warm/Dry, without rashes or lesions  Assessment:  Principal Problem:    Dissection of thoracic aorta (HCC)  Active Problems:    Benign essential hypertension    Abnormal TSH    Benign hypertension with CKD (chronic kidney disease) stage III (HCC)    Benign prostatic hyperplasia without lower urinary tract symptoms    S/P aorta repair    Leukocytosis    Thrombocytopenia (HCC)    Postoperative anemia due to acute blood loss    Anticoagulation goal of INR 2 to 3       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, Ascending aortic aneurysm     S/P Replacement of ascending aorta with aggressive hemiarch reconstruction and aortic valve resuspension with a 26 mm Dacron graft; POD # 12    Plan:    1  Cardiac:   Atrial Fibrillation; Hypotensive  No beta blocker due to hypotension  Coumadin dosing for PAF   Continue ASA and Statin therapy  Epicardial pacing wires out  Patient no longer has central IV access   Continue DVT prophylaxis    2  Pulmonary: ID consult placed, called Ivan to see patient, Serratia in sputum, WBC 30k, afebrile, blood culture NGTD   Acute post-op pulmonary insufficiency; Requiring 3 liters via nasal cannula, secondary to atelectasis and pleural effusion  Continue incentive spirometry/coughing/deep breathing exercises  Wean supplemental oxygen as tolerated for saturation > 90%  Chest tubes have been discontinued    3   Renal: HD per nephrology     4  Neuro:  Neurologically intact; No active issues  Incisional pain well-controlled; Continue prn Percocet    5  GI:  Tolerating TLC 2 3 gm sodium diet  Maintain 1800 mL daily fluid restriction   Continue stool softeners and prn suppository  Continue GI prophylaxis    6  Endo:   No history of diabetes; Glucose well-controlled with sliding scale coverage    7    Hematology:    Post-operative acute blood loss anemia; Hemoglobin 9 9; trend prn    8  Disposition:      Awaiting rehab placement    VTE Pharmacologic Prophylaxis: Heparin  VTE Mechanical Prophylaxis: sequential compression device    Collaborative rounds completed with CHIDI Sanchez    Plan of care discussed with bedside nurse    SIGNATURE: Basil Ryder PA-C  DATE: December 27, 2019  TIME: 9:11 AM

## 2019-12-27 NOTE — CONSULTS
Consultation - Infectious Disease   Oliver Sharpe 70 y o  male MRN: [id number]  Unit/Bed#: [facility] 406-01 Encounter: [id number]      IMPRESSION & RECOMMENDATIONS:   Impression/Recommendations:  1  Leukocytosis  Unclear etiology  UA, LFTs, blood cultures negative  Consider pneumonia given cough, sputum culture, left lower lobe infiltrate although worsening despite appropriate antibiotics  Consider gastroenteritis given recent vomiting although may also be medication related  Consider postoperative abscess  Consider gout given bilateral hallux pain although exam relatively bland  Consider pancreatitis given back pain  Consider other noninfectious etiologies  Clinically stable without fever  Rec:  · Continue cefepime for now  · Discontinue Flagyl given vomiting  · Check procalcitonin, uric acid, lipase in AM  · Check CT C/A/P with oral contrast only  · Follow temperatures closely  · Recheck CBC in a m  · Supportive care as per the primary service    2  Left lower lobe pneumonia  Versus atelectasis in setting of postoperative effusion  Sputum with Serratia  Status post left thoracentesis with bland chemistries and negative cultures  Rec:  · Continue cefepime as above  · Check CT chest as above  · Check procalcitonin in a m  · Follow respiratory status closely    3  Acute type A aortic dissection  Status post jordy arch reconstruction cannula aortic valve suspension using deck run graft 12/15  Rec:  · Postoperative care per cardiac surgery    4  AFSHIN on CKD  In setting of recent surgery  Now on HD  Rec:  · Dose adjust antibiotics for HD  · Recheck BMP in a m  · Renal follow-up ongoing    The above impression and plan was discussed in detail with the patient, his daughters at the bedside, and the cardiac surgery service  Antibiotics:  Cefepime/Flagyl # 3    Thank you for this consultation  We will follow along with you      HISTORY OF PRESENT ILLNESS:  Reason for Consult:  Abnormal sputum    HPI: Jeffrey Smith is a 70 y o  male with multiple medical problems as listed below who initially presented to the emergency department on 12/14 complaining of acute onset of chest pain  He was found to have an acute type A aortic dissection  He was taken to the OR the following morning 12/15 for jordy arch reconstruction and aortic valve suspension using a Dacron graft  His postoperative course was complicated by AFib, AFSHIN requiring HD, AFib, left pleural effusion status post thoracentesis on 12/24 which yielded bloody fluid with negative cultures  On 12/24 he began to develop a worsening leukocytosis  Blood cultures were obtained and have been negative  He was started on cefepime and Flagyl for possible pneumonia in the setting of increased sputum production and left lower lobe infiltrate although chest x-ray improved after thoracentesis  Sputum culture has grown Serratia  Despite antibiotics is WBC continues to rise and we are asked to comment on further evaluation and management  Patient describes vomiting for the past 24 hours  He has back pain which is new  He also describes left greater than right hallux pain  Denies diarrhea  In performing this consult, I have reviewed prior admission and outpatient visit records in detail  REVIEW OF SYSTEMS:  As per HPI  A complete system-based review of systems is otherwise negative  PAST MEDICAL HISTORY:  Past Medical History:   Diagnosis Date    Arthritis     BPH without urinary obstruction     CKD (chronic kidney disease), stage III (HCC)     baseline 1 6-1 7    GERD (gastroesophageal reflux disease)     Hyperlipidemia     Hypertension      Past Surgical History:   Procedure Laterality Date    APPENDECTOMY      COLONOSCOPY  2017    FRACTURE SURGERY      IR TEMP HD CATH  12/23/2019    IR THORACENTESIS  12/24/2019    NC ASCEND AORTA GRAFT W ROOT REPLACMENT  VALVE CONDUIT/CORON RECONSTRUCT N/A 12/15/2019    Procedure: BENTALL PROCEDURE (ASCENDING AORTIC REPAIR) with 26mm Gelweave Graft;  Surgeon: Dolly Alan DO;  [facility]: [facility];  Service: Cardiac Surgery    [place] RECONSTR TOTAL SHOULDER IMPLANT Right 2017    Procedure: ARTHROPLASTY SHOULDER REVERSE with OPEN CYST EXCISION;  Surgeon: Luda Soria MD;  Location: [facility];  Service: Orthopedics    SHOULDER SURGERY      WRIST SURGERY         FAMILY HISTORY:  Non-contributory    SOCIAL HISTORY:  Social History     Substance and Sexual Activity   Alcohol Use Not Currently     Social History     Substance and Sexual Activity   Drug Use Not Currently     Social History     Tobacco Use   Smoking Status Former Smoker    Packs/day: 1 00    Last attempt to quit: Rony Aguilar Years since quittin 0   Smokeless Tobacco Never Used       ALLERGIES:  No Known Allergies    MEDICATIONS:  All current active medications have been reviewed  PHYSICAL EXAM:  Vitals:  Temp:  [97 3 °F (36 3 °C)-98 3 °F (36 8 °C)] 97 3 °F (36 3 °C)  HR:  [63-88] 85  Resp:  [18] 18  BP: ()/(44-61) 101/59  SpO2:  [90 %-97 %] 90 %  Temp (24hrs), Av 9 °F (36 6 °C), Min:97 3 °F (36 3 °C), Max:98 3 °F (36 8 °C)  Current: Temperature: (!) 97 3 °F (36 3 °C)     Physical Exam:  General:  Fatigued-appearing male lying in chair, in no acute distress  Eyes:  Conjunctive clear with no hemorrhages or effusions  Oropharynx:  No ulcers, no lesions  Neck:  Supple, no lymphadenopathy  Lungs:  Clear to auscultation bilaterally anteriorly, no accessory muscle use  Chest:  Right upper chest and sternal incisions intact with surgical glue  No sternal instability or rocking  No cellulitis or drainage  Cardiac:  Regular rate and rhythm, no murmurs  Abdomen:  Soft, non-tender, non-distended  Extremities:  No peripheral cyanosis, clubbing, or edema    Tenderness left hallux but no warmth, swelling, or erythema  Skin:  No rashes, no ulcers  Neurological:  Moves all four extremities spontaneously, sensation grossly intact    LABS, IMAGING, & OTHER STUDIES:  Lab Results:  I have personally reviewed pertinent labs  Results from last 7 days   Lab Units 12/26/19  0522 12/25/19  1014 12/25/19  0442 12/24/19  0532   POTASSIUM mmol/L 4 8  --  4 7 4 9   CHLORIDE mmol/L 99*  --  101 100   CO2 mmol/L 23  --  28 27   BUN mg/dL 136*  --  108* 117*   CREATININE mg/dL 6 68*  --  5 14* 4 92*   EGFR ml/min/1 73sq m 7  --  10 11   CALCIUM mg/dL 7 6*  --  7 7* 8 0*   AST U/L  --  66*  --   --    ALT U/L  --  26  --   --    ALK PHOS U/L  --  168*  --   --      Results from last 7 days   Lab Units 12/27/19  0516 12/26/19  0522 12/25/19  0442   WBC Thousand/uL 30 98* 26 38* 29 73*   HEMOGLOBIN g/dL 9 9* 11 7* 11 4*   PLATELETS Thousands/uL 236 288 312     Results from last 7 days   Lab Units 12/27/19  1010 12/26/19  1624 12/25/19  0613   BLOOD CULTURE   --  Received in Microbiology Lab  Culture in Progress  --    SPUTUM CULTURE   --   --  3+ Growth of Serratia marcescens*   GRAM STAIN RESULT  2+ Polys  No bacteria seen  --  No polys seen*  4+ Gram negative rods*   BODY FLUID CULTURE, STERILE  No growth  --   --        Imaging Studies:   I have personally reviewed pertinent imaging study reports and images in PACS  CXR reviewed personally improved left pleural effusion with persistent left lower lobe infiltrate  EKG, Pathology, and Other Studies:   I have personally reviewed pertinent reports

## 2019-12-28 ENCOUNTER — APPOINTMENT (INPATIENT)
Dept: DIALYSIS | Facility: HOSPITAL | Age: 79
DRG: 219 | End: 2019-12-28
Attending: INTERNAL MEDICINE
Payer: COMMERCIAL

## 2019-12-28 LAB
ANION GAP SERPL CALCULATED.3IONS-SCNC: 13 MMOL/L (ref 4–13)
BUN SERPL-MCNC: 100 MG/DL (ref 5–25)
CALCIUM SERPL-MCNC: 8.1 MG/DL (ref 8.3–10.1)
CHLORIDE SERPL-SCNC: 96 MMOL/L (ref 100–108)
CO2 SERPL-SCNC: 24 MMOL/L (ref 21–32)
CREAT SERPL-MCNC: 6.85 MG/DL (ref 0.6–1.3)
ERYTHROCYTE [DISTWIDTH] IN BLOOD BY AUTOMATED COUNT: 14.3 % (ref 11.6–15.1)
GFR SERPL CREATININE-BSD FRML MDRD: 7 ML/MIN/1.73SQ M
GLUCOSE SERPL-MCNC: 105 MG/DL (ref 65–140)
GLUCOSE SERPL-MCNC: 115 MG/DL (ref 65–140)
GLUCOSE SERPL-MCNC: 119 MG/DL (ref 65–140)
GLUCOSE SERPL-MCNC: 157 MG/DL (ref 65–140)
HCT VFR BLD AUTO: 31.2 % (ref 36.5–49.3)
HEMOCCULT STL QL: NEGATIVE
HGB BLD-MCNC: 10.1 G/DL (ref 12–17)
INR PPP: 1.83 (ref 0.84–1.19)
LIPASE SERPL-CCNC: 265 U/L (ref 73–393)
MAGNESIUM SERPL-MCNC: 2.7 MG/DL (ref 1.6–2.6)
MCH RBC QN AUTO: 30 PG (ref 26.8–34.3)
MCHC RBC AUTO-ENTMCNC: 32.4 G/DL (ref 31.4–37.4)
MCV RBC AUTO: 93 FL (ref 82–98)
PHOSPHATE SERPL-MCNC: 7.1 MG/DL (ref 2.3–4.1)
PLATELET # BLD AUTO: 268 THOUSANDS/UL (ref 149–390)
PMV BLD AUTO: 10.2 FL (ref 8.9–12.7)
POTASSIUM SERPL-SCNC: 4.3 MMOL/L (ref 3.5–5.3)
PROCALCITONIN SERPL-MCNC: 10.98 NG/ML
PROTHROMBIN TIME: 20.7 SECONDS (ref 11.6–14.5)
RBC # BLD AUTO: 3.37 MILLION/UL (ref 3.88–5.62)
SODIUM SERPL-SCNC: 133 MMOL/L (ref 136–145)
URATE SERPL-MCNC: 9 MG/DL (ref 4.2–8)
WBC # BLD AUTO: 26.74 THOUSAND/UL (ref 4.31–10.16)

## 2019-12-28 PROCEDURE — 94760 N-INVAS EAR/PLS OXIMETRY 1: CPT

## 2019-12-28 PROCEDURE — 84100 ASSAY OF PHOSPHORUS: CPT | Performed by: INTERNAL MEDICINE

## 2019-12-28 PROCEDURE — 99233 SBSQ HOSP IP/OBS HIGH 50: CPT | Performed by: INTERNAL MEDICINE

## 2019-12-28 PROCEDURE — 82948 REAGENT STRIP/BLOOD GLUCOSE: CPT

## 2019-12-28 PROCEDURE — 83690 ASSAY OF LIPASE: CPT | Performed by: INTERNAL MEDICINE

## 2019-12-28 PROCEDURE — 80048 BASIC METABOLIC PNL TOTAL CA: CPT | Performed by: INTERNAL MEDICINE

## 2019-12-28 PROCEDURE — 94668 MNPJ CHEST WALL SBSQ: CPT

## 2019-12-28 PROCEDURE — 94640 AIRWAY INHALATION TREATMENT: CPT

## 2019-12-28 PROCEDURE — 85610 PROTHROMBIN TIME: CPT | Performed by: PHYSICIAN ASSISTANT

## 2019-12-28 PROCEDURE — 85027 COMPLETE CBC AUTOMATED: CPT | Performed by: INTERNAL MEDICINE

## 2019-12-28 PROCEDURE — 83735 ASSAY OF MAGNESIUM: CPT | Performed by: INTERNAL MEDICINE

## 2019-12-28 PROCEDURE — 82272 OCCULT BLD FECES 1-3 TESTS: CPT | Performed by: INTERNAL MEDICINE

## 2019-12-28 PROCEDURE — 99024 POSTOP FOLLOW-UP VISIT: CPT | Performed by: THORACIC SURGERY (CARDIOTHORACIC VASCULAR SURGERY)

## 2019-12-28 PROCEDURE — 99232 SBSQ HOSP IP/OBS MODERATE 35: CPT | Performed by: INTERNAL MEDICINE

## 2019-12-28 PROCEDURE — 84550 ASSAY OF BLOOD/URIC ACID: CPT | Performed by: INTERNAL MEDICINE

## 2019-12-28 PROCEDURE — 84145 PROCALCITONIN (PCT): CPT | Performed by: INTERNAL MEDICINE

## 2019-12-28 RX ORDER — MUSCLE RUB CREAM 100; 150 MG/G; MG/G
CREAM TOPICAL 4 TIMES DAILY PRN
Status: DISCONTINUED | OUTPATIENT
Start: 2019-12-28 | End: 2020-01-07 | Stop reason: HOSPADM

## 2019-12-28 RX ORDER — WARFARIN SODIUM 1 MG/1
1 TABLET ORAL
Status: COMPLETED | OUTPATIENT
Start: 2019-12-28 | End: 2019-12-28

## 2019-12-28 RX ORDER — ALBUMIN (HUMAN) 12.5 G/50ML
12.5 SOLUTION INTRAVENOUS ONCE
Status: COMPLETED | OUTPATIENT
Start: 2019-12-28 | End: 2019-12-28

## 2019-12-28 RX ORDER — AMOXICILLIN 250 MG
1 CAPSULE ORAL 2 TIMES DAILY
Status: DISCONTINUED | OUTPATIENT
Start: 2019-12-28 | End: 2020-01-07 | Stop reason: HOSPADM

## 2019-12-28 RX ORDER — METOCLOPRAMIDE HYDROCHLORIDE 5 MG/ML
5 INJECTION INTRAMUSCULAR; INTRAVENOUS EVERY 8 HOURS PRN
Status: DISCONTINUED | OUTPATIENT
Start: 2019-12-28 | End: 2020-01-07 | Stop reason: HOSPADM

## 2019-12-28 RX ADMIN — ONDANSETRON 4 MG: 2 INJECTION INTRAMUSCULAR; INTRAVENOUS at 06:47

## 2019-12-28 RX ADMIN — METOPROLOL TARTRATE 12.5 MG: 25 TABLET ORAL at 08:15

## 2019-12-28 RX ADMIN — MIDODRINE HYDROCHLORIDE 10 MG: 5 TABLET ORAL at 11:24

## 2019-12-28 RX ADMIN — PANTOPRAZOLE SODIUM 40 MG: 40 TABLET, DELAYED RELEASE ORAL at 08:16

## 2019-12-28 RX ADMIN — METOCLOPRAMIDE 5 MG: 5 INJECTION, SOLUTION INTRAMUSCULAR; INTRAVENOUS at 21:18

## 2019-12-28 RX ADMIN — ONDANSETRON 4 MG: 2 INJECTION INTRAMUSCULAR; INTRAVENOUS at 13:33

## 2019-12-28 RX ADMIN — MENTHOL, METHYL SALICYLATE: 10; 15 CREAM TOPICAL at 15:25

## 2019-12-28 RX ADMIN — WARFARIN SODIUM 1 MG: 1 TABLET ORAL at 17:12

## 2019-12-28 RX ADMIN — LEVALBUTEROL HYDROCHLORIDE 1.25 MG: 1.25 SOLUTION, CONCENTRATE RESPIRATORY (INHALATION) at 07:16

## 2019-12-28 RX ADMIN — METOPROLOL TARTRATE 12.5 MG: 25 TABLET ORAL at 21:18

## 2019-12-28 RX ADMIN — ISODIUM CHLORIDE 3 ML: 0.03 SOLUTION RESPIRATORY (INHALATION) at 19:20

## 2019-12-28 RX ADMIN — ONDANSETRON 4 MG: 2 INJECTION INTRAMUSCULAR; INTRAVENOUS at 18:01

## 2019-12-28 RX ADMIN — GUAIFENESIN 600 MG: 600 TABLET, EXTENDED RELEASE ORAL at 08:16

## 2019-12-28 RX ADMIN — OXYCODONE HYDROCHLORIDE AND ACETAMINOPHEN 2 TABLET: 5; 325 TABLET ORAL at 13:33

## 2019-12-28 RX ADMIN — ISODIUM CHLORIDE 3 ML: 0.03 SOLUTION RESPIRATORY (INHALATION) at 07:16

## 2019-12-28 RX ADMIN — CEFEPIME HYDROCHLORIDE 1000 MG: 1 INJECTION, POWDER, FOR SOLUTION INTRAMUSCULAR; INTRAVENOUS at 13:33

## 2019-12-28 RX ADMIN — LEVALBUTEROL HYDROCHLORIDE 1.25 MG: 1.25 SOLUTION, CONCENTRATE RESPIRATORY (INHALATION) at 13:55

## 2019-12-28 RX ADMIN — ISODIUM CHLORIDE 3 ML: 0.03 SOLUTION RESPIRATORY (INHALATION) at 13:55

## 2019-12-28 RX ADMIN — MIDODRINE HYDROCHLORIDE 10 MG: 5 TABLET ORAL at 06:38

## 2019-12-28 RX ADMIN — INSULIN LISPRO 1 UNITS: 100 INJECTION, SOLUTION INTRAVENOUS; SUBCUTANEOUS at 16:49

## 2019-12-28 RX ADMIN — LEVALBUTEROL HYDROCHLORIDE 1.25 MG: 1.25 SOLUTION, CONCENTRATE RESPIRATORY (INHALATION) at 19:20

## 2019-12-28 RX ADMIN — MIDODRINE HYDROCHLORIDE 10 MG: 5 TABLET ORAL at 16:48

## 2019-12-28 RX ADMIN — ALBUMIN (HUMAN) 12.5 G: 25 SOLUTION INTRAVENOUS at 10:39

## 2019-12-28 RX ADMIN — ASPIRIN 81 MG 81 MG: 81 TABLET ORAL at 08:16

## 2019-12-28 RX ADMIN — METOCLOPRAMIDE 10 MG: 5 INJECTION, SOLUTION INTRAMUSCULAR; INTRAVENOUS at 06:38

## 2019-12-28 RX ADMIN — ONDANSETRON 4 MG: 2 INJECTION INTRAMUSCULAR; INTRAVENOUS at 23:32

## 2019-12-28 NOTE — PLAN OF CARE
Potassium 4 3 HD on 3 k bath  Bp remains on the lower side, so no fluid removed    Dr Gonzalo Torres aware

## 2019-12-28 NOTE — PROGRESS NOTES
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, Ascending aortic aneurysm     S/P Replacement of ascending aorta with aggressive hemiarch reconstruction and aortic valve resuspension with a 26 mm Dacron graft; POD # 13    24 Hour Events: midodrine increased to 10 mg tid yesterday due to hypotension     Seen by ID yesterday, cefepime continues, d/c'd flagyl, remains afebrile   CT scan done: atelectasis v aspiration, wire dissociation    Medications:   Scheduled Meds:  Current Facility-Administered Medications:  acetaminophen 650 mg Oral Q4H PRN Tayler Mccann PA-C    aspirin 81 mg Oral Daily Sumaya Perry PA-C    bisacodyl 10 mg Rectal Daily PRN Ever Linder PA-C    cefepime 1,000 mg Intravenous Q24H Deuce Lopez PA-C Last Rate: 1,000 mg (12/27/19 1328)   fentanyl citrate (PF) 25 mcg Intravenous Q2H PRN Nadeen Bull PA-C    guaiFENesin 600 mg Oral Q12H [facility] Sumaya Perry PA-C    insulin lispro 1-5 Units Subcutaneous TID  Alex Linder PA-C    insulin lispro 1-5 Units Subcutaneous  Alex Linder PA-C    iohexol 50 mL Oral 90 min pre-procedure Talon Srivastava PA-C    levalbuterol 1 25 mg Nebulization TID Graciela Nickerson DO    metoclopramide 10 mg Intravenous Q6H [facility] Nadeen Bull PA-C    metoprolol tartrate 12 5 mg Oral Q12H [facility] Shawna Hawthorne MD    midodrine 10 mg Oral TID AC Tyshawn Perez MD    ondansetron 4 mg Intravenous Q6H Anderson Longoria PA-C    oxyCODONE-acetaminophen 1 tablet Oral Q4H PRN Tayler Mccann PA-C    oxyCODONE-acetaminophen 2 tablet Oral Q6H PRN Ever Linder PA-C    pantoprazole 40 mg Oral Daily Alex Linder PA-C    polyethylene glycol 17 g Oral Daily Alex Linder PA-C    sodium chloride 3 mL Nebulization TID Graciela Nickerson DO    temazepam 15 mg Oral HS PRN Ever Linder PA-C      Continuous Infusions:   PRN Meds:   acetaminophen   bisacodyl    fentanyl citrate (PF)    oxyCODONE-acetaminophen    oxyCODONE-acetaminophen    temazepam    Vitals:   Vitals:    12/27/19 2329 12/28/19 0300 12/28/19 0600 12/28/19 0716   BP: 101/58 90/59     BP Location: Left arm Left arm     Pulse: 70 87     Resp: 18 21     Temp: 97 7 °F (36 5 °C) 97 8 °F (36 6 °C)     TempSrc: Oral Oral     SpO2: 93% 94%  94%   Weight:   79 8 kg (175 lb 14 8 oz)    Height:           Telemetry: Atrial Fibrillation; Heart Rate: 93    Respiratory:   SpO2: SpO2: 100 %; 4 LPM    Intake/Output:   I/O       12/26 0701 - 12/27 0700 12/27 0701 - 12/28 0700 12/28 0701 - 12/29 0700    P  O  240 205     I V  (mL/kg) 500 (6 3)      Other 100      IV Piggyback 150      Total Intake(mL/kg) 990 (12 4) 205 (2 6)     Urine (mL/kg/hr)  250 (0 1)     Emesis/NG output  300     Other 850 500     Total Output 850 1050     Net +140 -845                  Weights:   Weight (last 2 days)     Date/Time   Weight    12/28/19 0600   79 8 (175 93)    12/27/19 0600   79 9 (176 1)    12/26/19 0526   79 4 (175 1)            Results:   Results from last 7 days   Lab Units 12/28/19  0455 12/27/19  0516 12/26/19  0522   WBC Thousand/uL 26 74* 30 98* 26 38*   HEMOGLOBIN g/dL 10 1* 9 9* 11 7*   HEMATOCRIT % 31 2* 29 9* 36 9   PLATELETS Thousands/uL 268 236 288     Results from last 7 days   Lab Units 12/28/19  0455 12/26/19  0522 12/25/19  0442   SODIUM mmol/L 133* 133* 138   POTASSIUM mmol/L 4 3 4 8 4 7   CHLORIDE mmol/L 96* 99* 101   CO2 mmol/L 24 23 28   BUN mg/dL 100* 136* 108*   CREATININE mg/dL 6 85* 6 68* 5 14*   CALCIUM mg/dL 8 1* 7 6* 7 7*     Results from last 7 days   Lab Units 12/28/19  0455 12/27/19  0516 12/26/19  0522   INR  1 83* 1 87* 3 02*   Coumadin: 0 mg x 3 days       blood culture: negative  Body fluid: negative thoracentesis  Sputum: serratia     Point of care glucose: 105-125    Studies:  CT chest abdomen pelvis po contrast:  IMPRESSION:     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  Persistent intramural hematoma extending from the aortic root to the level of the distal descending aorta, measuring up to 8mm in thickness        Recent median sternotomy with 1 0 cm bony diastasis  Fluid collection in the retrosternal space measuring 5 2 x 2 3 x 10 5 cm could be postsurgical but superimposed infection cannot be excluded        Trace right and small left pleural effusions      Dense bibasilar consolidation representing atelectasis or aspiration      Nodular liver contour suggesting cirrhosis  US retroperitoneal:   IMPRESSION:     No hydronephrosis      Asymmetric atrophy of the right kidney      Small left renal cysts      The bladder is decompressed and poorly evaluated  Invasive Lines/Tubes:  Invasive Devices     Peripheral Intravenous Line            Peripheral IV 12/26/19 Proximal;Right;Ventral (anterior) Forearm 1 day          Hemodialysis Catheter            HD Temporary Double Catheter 4 days                Physical Exam:    HEENT/NECK:  PERRLA  No jugular venous distention  Cardiac: Irregularly irregular rate and rhythm  Pulmonary:  Breath sounds clear bilaterally  Abdomen:  Non-tender, Non-distended and Normal bowel sounds  Incisions: Sternum is stable  Incision is clean, dry, and intact  Some minimal sternal movement with cough, none noted at rest  Extremities: Extremities warm/dry and Trace edema B/L  Neuro: Alert and oriented X 3  Skin: Warm/Dry, without rashes or lesions      Assessment:  Principal Problem:    Dissection of thoracic aorta (HCC)  Active Problems:    Benign essential hypertension    Abnormal TSH    Benign hypertension with CKD (chronic kidney disease) stage III (HCC)    Benign prostatic hyperplasia without lower urinary tract symptoms    S/P aorta repair    Leukocytosis    Thrombocytopenia (HCC)    Postoperative anemia due to acute blood loss    Anticoagulation goal of INR 2 to 3     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, Ascending aortic aneurysm     S/P Replacement of ascending aorta with aggressive hemiarch reconstruction and aortic valve resuspension with a 26 mm Dacron graft; POD # 13    Plan:    1  Cardiac:   Atrial Fibrillation; Hypotensive  Rate controlled atrial fibrillation in the 90s, BP is better but midodrine increased to 10 mg tid yesterday   Coumadin held x 3 day due to initial supratherapeutic level but then due to IR thoracentesis yesterday   Continue ASA and Statin therapy  Epicardial pacing wires out  Patient no longer has central IV access   Continue DVT prophylaxis    2  Pulmonary:   3  On cefepime day 3 for pulmonary source suggested infection  4  ID following patient   Acute post-op pulmonary insufficiency; Requiring 4 liters via nasal cannla, secondary to atelectasis and pleural effusion  Continue incentive spirometry/coughing/deep breathing exercises  Wean supplemental oxygen as tolerated for saturation > 90%  Chest tubes have been discontinued    5  Renal: HD per nephrology   Have difficulty pulling off volume due to hypotension     6  Neuro:  Neurologically intact; No active issues  Incisional pain well-controlled; Continue prn Percocet    7  GI:  Nausea somewhat now improved, did receive a dose of reglan yesterday evening   Maintain 1800 mL daily fluid restriction   Continue stool softeners and prn suppository  Continue GI prophylaxis    8  Endo:   No history of diabetes; Glucose well-controlled with sliding scale coverage    7    Hematology:    Post-operative blood count acceptable; Trend prn    8  Disposition:      Awaiting rehab placement when medically ready     VTE Pharmacologic Prophylaxis: Warfarin (Coumadin)  VTE Mechanical Prophylaxis: sequential compression device    Collaborative rounds completed with Stana Kussmaul, D O    Plan of care discussed with bedside nurse    SIGNATURE: Shantelle Bradley PA-C  DATE: December 28, 2019  TIME: 7:40 AM

## 2019-12-28 NOTE — PROGRESS NOTES
Progress Note - Infectious Disease   Oliver Sharpe 70 y o  male MRN: [id number]  Unit/Bed#: [facility] 406-01 Encounter: [id number]      Impression/Recommendations:  1  Leukocytosis  Likely multifactorial due to #2, dialysis, anemia  Procalcitonin elevated  CT shows retrosternal collection which is likely postsurgical   No evidence of incisional cellulitis/drainage or sternal instability  UA, LFTs, lipase, blood cultures, CT A/P negative  Consider gout given bilateral hallux pain and elevated uric acid although exam relatively bland  Consider other noninfectious etiologies  Clinically stable without fever  Rec:  ? Continue cefepime for now  ? Serial incisional exams  ? Serial foot exam  ? Follow temperatures closely  ? Recheck CBC in a m   ? Supportive care as per the primary service     2  Bibasilar pneumonia  Consider due to aspiration   Consider also atelectasis  Sputum with Serratia  Status post left thoracentesis x2 with bland chemistries and negative cultures  Procalcitonin 10 98  Rec:  ? Continue cefepime as above  ? Recheck procalcitonin in 48 hours  ? Encourage OOB, incentive spirometry  ? Follow respiratory status closely     3  Acute type A aortic dissection  Status post jordy arch reconstruction cannula aortic valve suspension using deck run graft 12/15  Rec:  ? Postoperative care per cardiac surgery     4  AFSHIN on CKD  In setting of recent surgery  Now on HD  Rec:  ? Dose adjust antibiotics for HD  ? Recheck BMP in a m   ? Renal follow-up ongoing     Antibiotics:  Cefepime #4    Subjective:  Patient seen on AM rounds  Feels a little bit better  Sternum hurts with coughing  Denies fevers, chills, sweats, nausea, vomiting, or diarrhea  24 Hour Events:  Underwent repeat left thoracentesis  No documented fevers, chills, sweats, nausea, vomiting, or diarrhea  WBC trending downward      Objective:  Vitals:  Temp:  [97 3 °F (36 3 °C)-97 8 °F (36 6 °C)] 97 8 °F (36 6 °C)  HR:  Cristóbal Boast 91  Resp:  [18-21] 20  BP: ()/(53-60) 90/53  SpO2:  [90 %-100 %] 100 %  Temp (24hrs), Av 6 °F (36 4 °C), Min:97 3 °F (36 3 °C), Max:97 8 °F (36 6 °C)  Current: Temperature: 97 8 °F (36 6 °C)    Physical Exam:   General:  No acute distress  Eyes:  Normal lids and conjunctivae  ENT:  Normal external ears and nose  Neck:  Neck symmetric with midline trachea  Pulmonary:  Decreased respiratory effort without accessory muscle use  Chest:  Sternal incision intact with glue  Cardiovascular:  Regular rate and rhythm; no peripheral edema  Gastrointestinal:  No tenderness or distention  Musculoskeletal:  No digital clubbing or cyanosis  Skin:  No visible rashes; No palpable nodules  Neurologic:  Sensation grossly intact to light touch  Psychiatric:  Alert and oriented; flat affect    Lab Results:  I have personally reviewed pertinent labs    Results from last 7 days   Lab Units 19  0455 19  0522 19  1014 19  0442   POTASSIUM mmol/L 4 3 4 8  --  4 7   CHLORIDE mmol/L 96* 99*  --  101   CO2 mmol/L 24 23  --  28   BUN mg/dL 100* 136*  --  108*   CREATININE mg/dL 6 85* 6 68*  --  5 14*   EGFR ml/min/1 73sq m 7 7  --  10   CALCIUM mg/dL 8 1* 7 6*  --  7 7*   AST U/L  --   --  66*  --    ALT U/L  --   --  26  --    ALK PHOS U/L  --   --  168*  --      Results from last 7 days   Lab Units 19  0455 19  0516 19  0522   WBC Thousand/uL 26 74* 30 98* 26 38*   HEMOGLOBIN g/dL 10 1* 9 9* 11 7*   PLATELETS Thousands/uL 268 236 288     Results from last 7 days   Lab Units 19  1529 19  1010 19  1624 19  0077   BLOOD CULTURE   --   --  No Growth at 24 hrs   --    SPUTUM CULTURE   --   --   --  3+ Growth of Serratia marcescens*   GRAM STAIN RESULT  1+ Polys  No organisms seen 2+ Polys  No bacteria seen  --  No polys seen*  4+ Gram negative rods*   BODY FLUID CULTURE, STERILE   --  No growth  --   --        Imaging Studies:   I have personally reviewed pertinent imaging study reports and images in PACS  CT C/A/P reviewed personally retrosternal fluid collection, dense R>L bibasilar consolidation representing aspiration versus pneumonia  EKG, Pathology, and Other Studies:   I have personally reviewed pertinent reports

## 2019-12-28 NOTE — PROGRESS NOTES
NEPHROLOGY PROGRESS NOTE   Oliver Sharpe 70 y o  male MRN: [id number]  Unit/Bed#: [facility] [place] 406-01 Encounter: [id number]  Reason for Consult: AFSHIN/CKD    ASSESSMENT AND PLAN:  AFSHIN on CKD stage 3, baseline creatinine 1 4 to 1 8  -AFSHIN suspected to be secondary to postop ATN/contrast nephropathy  -creatinine on admission 1 8 progressively worsened to 5 1 and started on IHD on 12/23/19  Status post three session of HD  HD today  -BMP tomorrow a m   -urinalysis this admission shows 2 to 4 RBCs, no proteinuria, 5 to 10 hyaline casts, 10 to 20 WBCs  -check complements  -continue to monitor intake and output, urine output remains poor? Accurate  -CT scan this month showed atrophic right kidney, scattered areas of cortical thinning/scarring in the left kidney  -renal ultrasound this admission shows atrophic right kidney 6 7 cm, left kidney 10 3 cm, normal echogenicity, no hydronephrosis  -urine output remains poor although?  Accurate  -bladder scan nonsignificant on 12/26/19     Currently has temporary HD catheter and may need to change to PermCath if continued to require dialysis  with ongoing leukocytosis, will continue with temporary catheter for now   -blood culture negative so far     Severe azotemia with concern for uremic symptoms  -continue to monitor with ongoing dialysis  Seems to be slowly improving   still has nausea issues  Has fair appetite   -continue to closely monitor     CKD anemia, hemoglobin to closely monitor, transfuse p r n  For hemoglobin less than seven  -follow results for FOBT  -check iron studies once infection issues are resolved      Hypotension, midodrine was increased to 10 mg p o  T i d ; most recent DYLAN on 12/15/19 shows normal pericardium   -?  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, 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   Close CT surgery follow-up      Suspected left lower lobe pneumonia, antibiotic as per ID    Currently remains on 4 PRN, Bro Linder PA-C    cefepime (MAXIPIME) 1,000 mg in dextrose 5 % 50 mL IVPB, 1,000 mg, Intravenous, Q24H, Ca See [place], [place], Last Rate: 100 mL/hr at 12/27/19 1328, 1,000 mg at 12/27/19 1328    fentanyl citrate (PF) 100 MCG/2ML 25 mcg, 25 mcg, Intravenous, Q2H PRN, Gretta See PA-C, 25 mcg at 12/27/19 1727    guaiFENesin (MUCINEX) 12 hr tablet 600 mg, 600 mg, Oral, Q12H [address], Sumaya Perry PA-C, 600 mg at 12/27/19 2140    insulin lispro (HumaLOG) 100 units/mL subcutaneous injection 1-5 Units, 1-5 Units, Subcutaneous, TID AC, 1 Units at 12/26/19 0653 **AND** Fingerstick Glucose (POCT), , , TID AC, Alex Linder PA-C    insulin lispro (HumaLOG) 100 units/mL subcutaneous injection 1-5 Units, 1-5 Units, Subcutaneous, HS, Chuy Dozier PA-C, 1 Units at 12/26/19 2231    iohexol (OMNIPAQUE) 240 MG/ML solution 50 mL, 50 mL, Oral, 90 min pre-procedure, Jn Hernández PA-C    [facility]) inhalation solution 1 25 mg, 1 25 mg, Nebulization, TID, Jeanine Hills DO, 1 25 mg at 12/28/19 0716    metoclopramide (REGLAN) injection 10 mg, 10 mg, Intravenous, Q6H [address], Gretta See PA-C, 10 mg at 12/28/19 7724    metoprolol tartrate (LOPRESSOR) partial tablet 12 5 mg, 12 5 mg, Oral, Q12H [address], Melissa Donovan MD, 12 5 mg at 12/26/19 2223    midodrine (PROAMATINE) tablet 10 mg, 10 mg, Oral, TID AC, Chel Cheng MD, 10 mg at 12/28/19 0474    ondansetron (ZOFRAN) injection 4 mg, 4 mg, Intravenous, Q6H, Ca Weaver PA-C, 4 mg at 12/28/19 3770    oxyCODONE-acetaminophen (PERCOCET) 5-325 mg per tablet 1 tablet, 1 tablet, Oral, Q4H PRN, Chuy Dozier PA-C, 1 tablet at 12/25/19 2223    oxyCODONE-acetaminophen (PERCOCET) 5-325 mg per tablet 2 tablet, 2 tablet, Oral, Q6H PRN, Chuy Dozier PA-C, 2 tablet at 12/26/19 1432    pantoprazole (PROTONIX) EC tablet 40 mg, 40 mg, Oral, Daily, Alex Linder PA-C, 40 mg at 12/27/19 0810    polyethylene glycol (MIRALAX) packet 17 g, 17 g, Oral, Daily, Giovany Morrow PA-C, 17 g at 12/22/19 9956    sodium chloride 0 9 % inhalation solution 3 mL, 3 mL, Nebulization, TID, Viviankyle Dumont, DO, 3 mL at 12/28/19 0716    temazepam (RESTORIL) capsule 15 mg, 15 mg, Oral, HS PRN, Giovany Morrow PA-C    Laboratory Results:  Results from last 7 days   Lab Units 12/28/19  0455 12/27/19  0516 12/26/19  0522 12/25/19  0442 12/24/19  0532 12/23/19  2301 12/23/19  0448 12/22/19  0543   WBC Thousand/uL 26 74* 30 98* 26 38* 29 73* 23 82*  --  16 79* 16 09*   HEMOGLOBIN g/dL 10 1* 9 9* 11 7* 11 4* 11 7* 11 6* 11 6* 11 9*   HEMATOCRIT % 31 2* 29 9* 36 9 34 9* 36 2* 35 1* 35 6* 36 7   PLATELETS Thousands/uL 268 236 288 312 311  --  235 216   SODIUM mmol/L 133*  --  133* 138 137  --  137 138   POTASSIUM mmol/L 4 3  --  4 8 4 7 4 9  --  4 6 4 1   CHLORIDE mmol/L 96*  --  99* 101 100  --  102 103   CO2 mmol/L 24  --  23 28 27  --  28 28   BUN mg/dL 100*  --  136* 108* 117*  --  142* 125*   CREATININE mg/dL 6 85*  --  6 68* 5 14* 4 92*  --  5 15* 4 33*   CALCIUM mg/dL 8 1*  --  7 6* 7 7* 8 0*  --  8 1* 8 1*   MAGNESIUM mg/dL 2 7*  --   --   --   --   --  3 0*  --    PHOSPHORUS mg/dL 7 1*  --   --   --   --   --   --   --        US retroperitoneal complete   Final Result by Warren Rojas MD (12/27 2002)      No hydronephrosis  Asymmetric atrophy of the right kidney  Small left renal cysts  The bladder is decompressed and poorly evaluated  Workstation performed: [id number]         XR chest pa & lateral   Final Result by Danielle Donald MD (12/27 1659)      Small left pleural effusion, decreased since thoracentesis, without pneumothorax  Workstation performed: [id number]         CT chest abdomen pelvis w contrast   Final Result by Warren Rojas MD (12/27 1857)      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    Persistent intramural hematoma extending from the aortic root to the level of the distal descending aorta, measuring up to 8mm in thickness  Recent median sternotomy with 1 0 cm bony diastasis  Fluid collection in the retrosternal space measuring 5 2 x 2 3 x 10 5 cm could be postsurgical but superimposed infection cannot be excluded  Trace right and small left pleural effusions  Dense bibasilar consolidation representing atelectasis or aspiration  Nodular liver contour suggesting cirrhosis  Workstation performed: [id number]         IR thoracentesis   Final Result by Jess France MD (12/27 1611)   Technically successful ultrasound-guided thoracentesis  Workstation performed: [id number]         XR chest pa & lateral   Final Result by Leni Anders MD (12/26 1509)      Status post thoracentesis, with diminished size of left effusion and no evidence of pneumothorax            Workstation performed: [id number]         IR thoracentesis   Final Result by Sarah Adamson MD (12/24 1622)   Impression:    Successful ultrasound-guided left thoracentesis  Workstation performed: [id number]         XR chest pa & lateral   Final Result by Leni Anders MD (12/24 1433)      Increased size of left pleural effusion/underlying left basilar atelectasis or infiltrate  Workstation performed: [id number]         IR temp HD cath   Final Result by Kalpana Pardo MD (12/23 1919)   Impression:   Ultrasound and fluoroscopically guided placement of a 20 cm 14-Bengali nontunneled dialysis catheter via the right internal jugular vein  Catheter is ready for immediate use  Workstation performed: [id number]         XR chest pa & lateral   Final Result by Neelima Ramirez MD (12/20 7989)   Persistent small left basilar pleural effusion with lungs otherwise clear  Workstation performed: [id number]         XR chest portable   Final Result by Teofilo Engle MD (75/44 5328)      Extubation  Mild bibasilar atelectasis  Expected appearance after cardiac surgery        Workstation performed: [id number]         XR chest portable ICU   Final Result by Miguel Ángel Mccormick MD (04/00 0509)      1  Status post sternotomy  Lines and support devices appear in position as noted above  Central fullness of the pulmonary vasculature may represent mild pulmonary vascular congestion  Workstation performed: [id number]         CTA dissection protocol chest abdomen pelvis w wo contrast   Final Result by aJckeline He MD (12/14 4690)      1  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 tapering to 4 1 cm at the aortic arch  2   There is crescentic intermediate density along the aortic wall extending from the aortic root through the aortic arch and along the proximal descending thoracic aorta compatible with acute type A intramural hematoma  Emergent vascular consultation    is recommended  I personally discussed this study with Caden Isaac on 12/14/2019 at 10:57 PM                   Workstation performed: [id number]         XR chest 2 views   Final Result by Miguel Barrett MD (12/15 4818)      Cardiomegaly   No acute consolidation or congestion            Workstation performed: [id number]             Portions of the record may have been created with voice recognition software  Occasional wrong word or "sound a like" substitutions may have occurred due to the inherent limitations of voice recognition software  Read the chart carefully and recognize, using context, where substitutions have occurred

## 2019-12-29 LAB
ALBUMIN SERPL BCP-MCNC: 1.9 G/DL (ref 3.5–5)
ALP SERPL-CCNC: 154 U/L (ref 46–116)
ALT SERPL W P-5'-P-CCNC: 21 U/L (ref 12–78)
ANION GAP SERPL CALCULATED.3IONS-SCNC: 6 MMOL/L (ref 4–13)
ANISOCYTOSIS BLD QL SMEAR: PRESENT
AST SERPL W P-5'-P-CCNC: 32 U/L (ref 5–45)
BASOPHILS # BLD MANUAL: 0.25 THOUSAND/UL (ref 0–0.1)
BASOPHILS NFR MAR MANUAL: 1 % (ref 0–1)
BILIRUB SERPL-MCNC: 0.83 MG/DL (ref 0.2–1)
BUN SERPL-MCNC: 67 MG/DL (ref 5–25)
C3 SERPL-MCNC: 47.7 MG/DL (ref 90–180)
C4 SERPL-MCNC: 13 MG/DL (ref 10–40)
CALCIUM SERPL-MCNC: 8.1 MG/DL (ref 8.3–10.1)
CHLORIDE SERPL-SCNC: 101 MMOL/L (ref 100–108)
CO2 SERPL-SCNC: 29 MMOL/L (ref 21–32)
CREAT SERPL-MCNC: 5.41 MG/DL (ref 0.6–1.3)
EOSINOPHIL # BLD MANUAL: 0.25 THOUSAND/UL (ref 0–0.4)
EOSINOPHIL NFR BLD MANUAL: 1 % (ref 0–6)
ERYTHROCYTE [DISTWIDTH] IN BLOOD BY AUTOMATED COUNT: 14.3 % (ref 11.6–15.1)
GFR SERPL CREATININE-BSD FRML MDRD: 9 ML/MIN/1.73SQ M
GLUCOSE SERPL-MCNC: 100 MG/DL (ref 65–140)
GLUCOSE SERPL-MCNC: 117 MG/DL (ref 65–140)
GLUCOSE SERPL-MCNC: 127 MG/DL (ref 65–140)
GLUCOSE SERPL-MCNC: 133 MG/DL (ref 65–140)
GLUCOSE SERPL-MCNC: 149 MG/DL (ref 65–140)
HCT VFR BLD AUTO: 30.2 % (ref 36.5–49.3)
HGB BLD-MCNC: 9.7 G/DL (ref 12–17)
INR PPP: 1.87 (ref 0.84–1.19)
LYMPHOCYTES # BLD AUTO: 0.25 THOUSAND/UL (ref 0.6–4.47)
LYMPHOCYTES # BLD AUTO: 1 % (ref 14–44)
MCH RBC QN AUTO: 30.3 PG (ref 26.8–34.3)
MCHC RBC AUTO-ENTMCNC: 32.1 G/DL (ref 31.4–37.4)
MCV RBC AUTO: 94 FL (ref 82–98)
METAMYELOCYTES NFR BLD MANUAL: 2 % (ref 0–1)
MONOCYTES # BLD AUTO: 1.97 THOUSAND/UL (ref 0–1.22)
MONOCYTES NFR BLD: 8 % (ref 4–12)
MYELOCYTES NFR BLD MANUAL: 1 % (ref 0–1)
NEUTROPHILS # BLD MANUAL: 21.13 THOUSAND/UL (ref 1.85–7.62)
NEUTS SEG NFR BLD AUTO: 86 % (ref 43–75)
NRBC BLD AUTO-RTO: 0 /100 WBCS
PLATELET # BLD AUTO: 264 THOUSANDS/UL (ref 149–390)
PLATELET BLD QL SMEAR: ADEQUATE
PMV BLD AUTO: 9.9 FL (ref 8.9–12.7)
POLYCHROMASIA BLD QL SMEAR: PRESENT
POTASSIUM SERPL-SCNC: 4.4 MMOL/L (ref 3.5–5.3)
PROT SERPL-MCNC: 5.8 G/DL (ref 6.4–8.2)
PROTHROMBIN TIME: 21 SECONDS (ref 11.6–14.5)
RBC # BLD AUTO: 3.2 MILLION/UL (ref 3.88–5.62)
RBC MORPH BLD: PRESENT
SODIUM SERPL-SCNC: 136 MMOL/L (ref 136–145)
WBC # BLD AUTO: 24.57 THOUSAND/UL (ref 4.31–10.16)

## 2019-12-29 PROCEDURE — 94668 MNPJ CHEST WALL SBSQ: CPT

## 2019-12-29 PROCEDURE — 85027 COMPLETE CBC AUTOMATED: CPT | Performed by: PHYSICIAN ASSISTANT

## 2019-12-29 PROCEDURE — 86335 IMMUNFIX E-PHORSIS/URINE/CSF: CPT | Performed by: PATHOLOGY

## 2019-12-29 PROCEDURE — 86335 IMMUNFIX E-PHORSIS/URINE/CSF: CPT | Performed by: INTERNAL MEDICINE

## 2019-12-29 PROCEDURE — 80053 COMPREHEN METABOLIC PANEL: CPT | Performed by: PHYSICIAN ASSISTANT

## 2019-12-29 PROCEDURE — 94664 DEMO&/EVAL PT USE INHALER: CPT

## 2019-12-29 PROCEDURE — 85007 BL SMEAR W/DIFF WBC COUNT: CPT | Performed by: PHYSICIAN ASSISTANT

## 2019-12-29 PROCEDURE — 85610 PROTHROMBIN TIME: CPT | Performed by: PHYSICIAN ASSISTANT

## 2019-12-29 PROCEDURE — 84166 PROTEIN E-PHORESIS/URINE/CSF: CPT | Performed by: INTERNAL MEDICINE

## 2019-12-29 PROCEDURE — 86160 COMPLEMENT ANTIGEN: CPT | Performed by: INTERNAL MEDICINE

## 2019-12-29 PROCEDURE — 99232 SBSQ HOSP IP/OBS MODERATE 35: CPT | Performed by: INTERNAL MEDICINE

## 2019-12-29 PROCEDURE — 84166 PROTEIN E-PHORESIS/URINE/CSF: CPT | Performed by: PATHOLOGY

## 2019-12-29 PROCEDURE — 99024 POSTOP FOLLOW-UP VISIT: CPT | Performed by: THORACIC SURGERY (CARDIOTHORACIC VASCULAR SURGERY)

## 2019-12-29 PROCEDURE — 94640 AIRWAY INHALATION TREATMENT: CPT

## 2019-12-29 PROCEDURE — 94760 N-INVAS EAR/PLS OXIMETRY 1: CPT

## 2019-12-29 PROCEDURE — 82948 REAGENT STRIP/BLOOD GLUCOSE: CPT

## 2019-12-29 RX ORDER — WARFARIN SODIUM 1 MG/1
1 TABLET ORAL
Status: COMPLETED | OUTPATIENT
Start: 2019-12-29 | End: 2019-12-29

## 2019-12-29 RX ADMIN — PANTOPRAZOLE SODIUM 40 MG: 40 TABLET, DELAYED RELEASE ORAL at 08:48

## 2019-12-29 RX ADMIN — CEFEPIME HYDROCHLORIDE 1000 MG: 1 INJECTION, POWDER, FOR SOLUTION INTRAMUSCULAR; INTRAVENOUS at 16:52

## 2019-12-29 RX ADMIN — METOPROLOL TARTRATE 12.5 MG: 25 TABLET ORAL at 08:47

## 2019-12-29 RX ADMIN — LEVALBUTEROL HYDROCHLORIDE 1.25 MG: 1.25 SOLUTION, CONCENTRATE RESPIRATORY (INHALATION) at 07:45

## 2019-12-29 RX ADMIN — ASPIRIN 81 MG 81 MG: 81 TABLET ORAL at 08:49

## 2019-12-29 RX ADMIN — ONDANSETRON 4 MG: 2 INJECTION INTRAMUSCULAR; INTRAVENOUS at 11:36

## 2019-12-29 RX ADMIN — GUAIFENESIN 600 MG: 600 TABLET, EXTENDED RELEASE ORAL at 21:25

## 2019-12-29 RX ADMIN — MIDODRINE HYDROCHLORIDE 10 MG: 5 TABLET ORAL at 06:04

## 2019-12-29 RX ADMIN — MIDODRINE HYDROCHLORIDE 10 MG: 5 TABLET ORAL at 17:18

## 2019-12-29 RX ADMIN — GUAIFENESIN 600 MG: 600 TABLET, EXTENDED RELEASE ORAL at 08:49

## 2019-12-29 RX ADMIN — ONDANSETRON 4 MG: 2 INJECTION INTRAMUSCULAR; INTRAVENOUS at 06:04

## 2019-12-29 RX ADMIN — LEVALBUTEROL HYDROCHLORIDE 1.25 MG: 1.25 SOLUTION, CONCENTRATE RESPIRATORY (INHALATION) at 13:32

## 2019-12-29 RX ADMIN — METOPROLOL TARTRATE 12.5 MG: 25 TABLET ORAL at 21:25

## 2019-12-29 RX ADMIN — WARFARIN SODIUM 1 MG: 1 TABLET ORAL at 17:18

## 2019-12-29 RX ADMIN — ISODIUM CHLORIDE 3 ML: 0.03 SOLUTION RESPIRATORY (INHALATION) at 13:32

## 2019-12-29 RX ADMIN — ISODIUM CHLORIDE 3 ML: 0.03 SOLUTION RESPIRATORY (INHALATION) at 20:05

## 2019-12-29 RX ADMIN — MIDODRINE HYDROCHLORIDE 10 MG: 5 TABLET ORAL at 11:34

## 2019-12-29 RX ADMIN — ISODIUM CHLORIDE 3 ML: 0.03 SOLUTION RESPIRATORY (INHALATION) at 07:45

## 2019-12-29 RX ADMIN — LEVALBUTEROL HYDROCHLORIDE 1.25 MG: 1.25 SOLUTION, CONCENTRATE RESPIRATORY (INHALATION) at 20:05

## 2019-12-29 NOTE — PROGRESS NOTES
Progress Note - Cardiothoracic Surgery   [facility] 70 y o  male MRN: [id number]  Unit/Bed#: [facility] 406-01 Encounter: [id number]  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, Ascending aortic aneurysm     S/P Replacement of ascending aorta with aggressive hemiarch reconstruction and aortic valve resuspension with a 26 mm Dacron graft; POD # 14    24 Hour Events: HD yesterday, no events overnight  BP stable  Complaints of overall fatigue this morning, ambulates well with encouragement  Tolerating diet this morning  Denies any pain  Cough much improved and not as much with good sputum production       Medications:   Scheduled Meds:  Current Facility-Administered Medications:  acetaminophen 650 mg Oral Q4H PRN Parvez Do PA-C    aspirin 81 mg Oral Daily Sumaya Perry PA-C    bisacodyl 10 mg Rectal Daily PRN Beto Linder PA-C    cefepime 1,000 mg Intravenous Q24H [facility] [place] [place] Last Rate: 1,000 mg (12/28/19 1333)   guaiFENesin 600 mg Oral Q12H [address] Sumaya Perry PA-C    insulin lispro 1-5 Units Subcutaneous TID AC Alex Linder PA-C    insulin lispro 1-5 Units Subcutaneous HS Alex Linder PA-C    iohexol 50 mL Oral 90 min pre-procedure Janna Leiva PA-C    levalbuterol 1 25 mg Nebulization TID Oliver Talamantes DO    menthol-methyl salicylate  Apply externally 4x Daily PRN Ann-Marie Hdez PA-C    metoclopramide 5 mg Intravenous Q8H PRN Gregory Weaver PA-C    metoprolol tartrate 12 5 mg Oral Q12H [address] Bibiana Metz MD    midodrine 10 mg Oral TID AC Osiris Gray MD    ondansetron 4 mg Intravenous Q6H Kin Fraire PA-C    oxyCODONE-acetaminophen 1 tablet Oral Q4H PRN Pavrez Do PA-C    oxyCODONE-acetaminophen 2 tablet Oral Q6H PRN Beto Linder PA-C    pantoprazole 40 mg Oral Daily Alex Linder PA-C    polyethylene glycol 17 g Oral Daily Cheryn Damme Cross, PA-C    senna-docusate sodium 1 tablet Oral BID Gregory Weaver PA-C    sodium chloride 3 mL Nebulization TID Elodia Pond, DO    temazepam 15 mg Oral HS PRN Rubia Hopper PA-C      Continuous Infusions:   PRN Meds:   acetaminophen    bisacodyl    menthol-methyl salicylate    metoclopramide    oxyCODONE-acetaminophen    oxyCODONE-acetaminophen    temazepam    Vitals:   Vitals:    12/28/19 2118 12/28/19 2326 12/29/19 0313 12/29/19 0640   BP: 109/65 103/64 104/51    BP Location:  Left arm Left arm    Pulse: 79 82 84    Resp:  16 16    Temp:  97 5 °F (36 4 °C) 98 1 °F (36 7 °C)    TempSrc:  Oral Oral    SpO2:  91% 92%    Weight:    80 kg (176 lb 5 9 oz)   Height:           Telemetry: Atrial Fibrillation; Heart Rate: 87    Respiratory:   SpO2: SpO2: 92 %; 3 LPM    Intake/Output:   I/O       12/27 0701 - 12/28 0700 12/28 0701 - 12/29 0700    P  O  205 720    I V  (mL/kg)  500 (6 3)    Total Intake(mL/kg) 205 (2 6) 1220 (15 3)    Urine (mL/kg/hr) 250 (0 1) 100 (0 1)    Emesis/NG output 300 0    Other 500 301    Stool  0    Total Output 1050 401    Net -845 +819          Unmeasured Urine Occurrence  2 x    Unmeasured Stool Occurrence  3 x    Unmeasured Emesis Occurrence  1 x        Weights:   Weight (last 2 days)     Date/Time   Weight    12/29/19 0640   80 (176 37)    12/28/19 0600   79 8 (175 93)    12/27/19 0600   79 9 (176 1)            Results:   Results from last 7 days   Lab Units 12/29/19  0435 12/28/19  0455 12/27/19  0516   WBC Thousand/uL 24 57* 26 74* 30 98*   HEMOGLOBIN g/dL 9 7* 10 1* 9 9*   HEMATOCRIT % 30 2* 31 2* 29 9*   PLATELETS Thousands/uL 264 268 236     Results from last 7 days   Lab Units 12/29/19  0435 12/28/19  0455 12/26/19  0522   SODIUM mmol/L 136 133* 133*   POTASSIUM mmol/L 4 4 4 3 4 8   CHLORIDE mmol/L 101 96* 99*   CO2 mmol/L 29 24 23   BUN mg/dL 67* 100* 136*   CREATININE mg/dL 5 41* 6 85* 6 68*   CALCIUM mg/dL 8 1* 8 1* 7 6*     Results from last 7 days   Lab Units 12/29/19  0435 12/28/19  0455 12/27/19  0516   INR  1 87* 1 83* 1 87*   Coumadin: 1 mg last evening, 0 mg x 3 days prior Point of care glucose: 105-157    Studies:  No new studies     Invasive Lines/Tubes:  Invasive Devices     Peripheral Intravenous Line            Peripheral IV 12/26/19 Proximal;Right;Ventral (anterior) Forearm 2 days          Hemodialysis Catheter            HD Temporary Double Catheter 5 days                Physical Exam:    HEENT/NECK:  PERRLA  No jugular venous distention  Cardiac: Irregularly irregular rate and rhythm  Pulmonary:  rhonchi upper airway, crackles to left base otherwise clear to auscultation   Abdomen:  Non-tender, Non-distended and Normal bowel sounds  Incisions: Sternum minimal click to posterior portion with cough, not unstable at rest, no drainage, no redness,   Incision is clean, dry, and intact  Extremities: Extremities warm/dry and Trace edema B/L  Neuro: Alert and oriented X 3  Skin: Warm/Dry, without rashes or lesions  Assessment:  Principal Problem:    Dissection of thoracic aorta (HCC)  Active Problems:    Benign essential hypertension    Abnormal TSH    Benign hypertension with CKD (chronic kidney disease) stage III (HCC)    Benign prostatic hyperplasia without lower urinary tract symptoms    S/P aorta repair    Leukocytosis    Thrombocytopenia (HCC)    Postoperative anemia due to acute blood loss    Anticoagulation goal of INR 2 to 3       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, Ascending aortic aneurysm     S/P Replacement of ascending aorta with aggressive hemiarch reconstruction and aortic valve resuspension with a 26 mm Dacron graft; POD # 14    Plan:    1   Cardiac:   Atrial Fibrillation; rate controlled   Lopressor, 12 5mg PO BID   Midodrine 10 mg tid   PAF: on coumadin   Continue ASA and Statin therapy  Epicardial pacing wires out  Patient no longer has central IV access   Continue DVT prophylaxis    ID: WBC downtrending, afebrile, possible aspiration pneumonia vs atelectasis, sputum positive for Serratia, ID following continue cefepime, thoracentesis fluid negative x 2 and blood culture negative to date    2  Pulmonary:   Acute post-op pulmonary insufficiency; Requiring 4 liters via nasal cannla, secondary to atelectasis and pneumonia  Continue incentive spirometry/coughing/deep breathing exercises  Wean supplemental oxygen as tolerated for saturation > 90%  Chest tubes have been discontinued    3  Renal: per nephrology, HD     4  Neuro:  Neurologically intact; No active issues  Incisional pain well-controlled;    5  GI:  Tolerating TLC 2 3 gm sodium diet  Maintain 1800 mL daily fluid restriction   Continue stool softeners and prn suppository  Continue GI prophylaxis    6  Endo:   No history of diabetes; Glucose well-controlled with sliding scale coverage    7    Hematology:    Post-operative acute blood loss anemia; Hemoglobin 9 7; trend prn    8  Disposition:      Awaiting rehab placement    VTE Pharmacologic Prophylaxis: Warfarin (Coumadin)  VTE Mechanical Prophylaxis: sequential compression device    Collaborative rounds completed with CHIDI Norwood    Plan of care discussed with bedside nurse    SIGNATURE: Thiago Doran PA-C  DATE: December 29, 2019  TIME: 6:57 AM

## 2019-12-29 NOTE — PROGRESS NOTES
NEPHROLOGY PROGRESS NOTE   Júnior Sharpe 70 y o  male MRN: [id number]  Unit/Bed#: [facility] 406-01 Encounter: [id number]  Reason for Consult: AFSHIN    ASSESSMENT AND PLAN:  AFSHIN on CKD stage 3, baseline creatinine 1 4 to 1 8  -AFSHIN suspected to be secondary to postop ATN/contrast nephropathy  -creatinine on admission 1 8 progressively worsened to 5 1 and started on IHD on 12/23/19   Status post 4 session of HD  No urgent need for HD today  Will reassess tomorrow for dialysis needs  -BMP tomorrow a m   -urinalysis this admission shows 2 to 4 RBCs, no proteinuria, 5 to 10 hyaline casts, 10 to 20 WBCs  -C3 level 47 7 lower, C4 level 13  Check other serological workup including AMY, Anca, anti-GBM antibody, chronic hepatitis panel, SPEP, UPEP  -continue to monitor intake and output, urine output remains poor? Accurate  -CT scan this month showed atrophic right kidney, scattered areas of cortical thinning/scarring in the left kidney  -renal ultrasound this admission shows atrophic right kidney 6 7 cm, left kidney 10 3 cm, normal echogenicity, no hydronephrosis  -urine output remains poor although?  Accurate  -bladder scan nonsignificant on 12/26/19     Currently has temporary HD catheter and may need to change to PermCath if continued to require dialysis  with ongoing leukocytosis, will continue with temporary catheter for now   -blood culture negative so far     Severe azotemia with concern for uremic symptoms  -continue to monitor with ongoing dialysis  Seems to be improving   Overall feels somewhat better today   -continue to closely monitor     CKD anemia, hemoglobin to closely monitor, transfuse p r n   For hemoglobin less than seven  -FOBT negative  -check iron studies once infection issues are resolved      Hypotension, midodrine was increased to 10 mg p o  T i d ; most recent DYLAN on 12/15/19 shows normal pericardium   -?  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, 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   Close CT surgery follow-up      Suspected left lower lobe pneumonia, antibiotic as per ID  Currently remains on 4 L oxygen via nasal cannula       Left-sided pleural effusion, status post thoracentesis, 1 5 L fluid removed on 12/24/19, status post thoracentesis again on 12/27/19      Discussed above plan in detail with CT surgery team     SUBJECTIVE:  Patient seen and examined at bedside  His nausea seems to be somewhat improving today   Still remains on oxygen via nasal cannula   Feels overall tired  UO remains lower       OBJECTIVE:  Current Weight: Weight - Scale: 80 kg (176 lb 5 9 oz)  Vitals:    12/29/19 0745   BP:    Pulse:    Resp:    Temp:    SpO2: 96%       Intake/Output Summary (Last 24 hours) at 12/29/2019 0829  Last data filed at 12/29/2019 0805  Gross per 24 hour   Intake 1310 ml   Output 401 ml   Net 909 ml     Wt Readings from Last 3 Encounters:   12/29/19 80 kg (176 lb 5 9 oz)   04/24/19 84 5 kg (186 lb 4 6 oz)   04/23/19 87 7 kg (193 lb 6 4 oz)     Temp Readings from Last 3 Encounters:   12/29/19 98 °F (36 7 °C) (Oral)   04/25/19 97 9 °F (36 6 °C) (Oral)   04/23/19 (!) 97 °F (36 1 °C)     BP Readings from Last 3 Encounters:   12/29/19 102/61   04/25/19 137/73   04/23/19 138/92     Pulse Readings from Last 3 Encounters:   12/29/19 75   04/25/19 81   04/23/19 (!) 46        Physical Examination:  General:  Sitting in bed, no acute distress   Eyes:  No conjunctival pallor present  ENT:  External examination of ears and nose unremarkable  Neck:  No obvious lymphadenopathy appreciated  Respiratory:  Bilateral decreased breath sound at base,  CVS:  S1, S2 present  GI:  Soft, nontender, nondistended  CNS:  Active alert oriented  Extremities:  No significant edema in legs  Skin:  No new rash in legs    Medications:    Current Facility-Administered Medications:     acetaminophen (TYLENOL) tablet 650 mg, 650 mg, Oral, Q4H PRN, Marty Menchaca PA-C    aspirin chewable tablet 81 mg, 81 mg, Oral, Daily, Sumaya Perry PA-C, 81 mg at 19 1930    bisacodyl (DULCOLAX) rectal suppository 10 mg, 10 mg, Rectal, Daily PRN, Virginia Linder PA-C    cefepime (MAXIPIME) 1,000 mg in dextrose 5 % 50 mL IVPB, 1,000 mg, Intravenous, Q24H, Marek Lopez PA-C, Last Rate: 100 mL/hr at 19 1333, 1,000 mg at 19 1333    guaiFENesin (MUCINEX) 12 hr tablet 600 mg, 600 mg, Oral, Q12H [facility], Sumaya Perry PA-C, 600 mg at 19 0816    insulin lispro (HumaLOG) 100 units/mL subcutaneous injection 1-5 Units, 1-5 Units, Subcutaneous, TID AC, 1 Units at 19 1649 **AND** Fingerstick Glucose (POCT), , , TID AC, Alex Linder PA-C    insulin lispro (HumaLOG) 100 units/mL subcutaneous injection 1-5 Units, 1-5 Units, Subcutaneous, HS, Dudley Mayers PA-C, 1 Units at 19 2231    iohexol (OMNIPAQUE) 240 MG/ML solution 50 mL, 50 mL, Oral, 90 min pre-procedure, Gilson Rollins PA-C    levalbuterol [facility]) inhalation solution 1 25 mg, 1 25 mg, Nebulization, TID, Calos , DO, 1 25 mg at 19 0745    menthol-methyl salicylate (BENGAY) 45-36 % cream, , Apply externally, 4x Daily PRN, Ann-Marie Hdez PA-C    metoclopramide (REGLAN) injection 5 mg, 5 mg, Intravenous, Q8H PRN, Marek Weaver PA-C, 5 mg at 19    metoprolol tartrate (LOPRESSOR) partial tablet 12 5 mg, 12 5 mg, Oral, Q12H [facility], Salas Webb MD, 12 5 mg at 198    midodrine (PROAMATINE) tablet 10 mg, 10 mg, Oral, TID AC, Mark Larson MD, 10 mg at 19 0604    ondansetron (ZOFRAN) injection 4 mg, 4 mg, Intravenous, Q6H, Marek Weaver PA-C, 4 mg at 19 0604    oxyCODONE-acetaminophen (PERCOCET) 5-325 mg per tablet 1 tablet, 1 tablet, Oral, Q4H PRN, Dudley Mayers PA-C, 1 tablet at 19 2223    oxyCODONE-acetaminophen (PERCOCET) 5-325 mg per tablet 2 tablet, 2 tablet, Oral, Q6H PRN, Dudley Mayers PA-C, 2 tablet at 19 1333    pantoprazole (PROTONIX) EC tablet 40 mg, 40 mg, Oral, Daily, Argenis Wetzel PA-C, 40 mg at 12/28/19 0816    polyethylene glycol (MIRALAX) packet 17 g, 17 g, Oral, Daily, Argenis Wetzel PA-C, 17 g at 12/22/19 0064    senna-docusate sodium (SENOKOT S) 8 6-50 mg per tablet 1 tablet, 1 tablet, Oral, BID, Ronel Elizondo PA-C    sodium chloride 0 9 % inhalation solution 3 mL, 3 mL, Nebulization, TID, Adriano Peoples, DO, 3 mL at 12/29/19 0745    temazepam (RESTORIL) capsule 15 mg, 15 mg, Oral, HS PRN, Pete Fsiher PA-C    Laboratory Results:  Results from last 7 days   Lab Units 12/29/19  0435 12/28/19  0455 12/27/19  0516 12/26/19  0522 12/25/19  0442 12/24/19  0532 12/23/19  2301 12/23/19  0448   WBC Thousand/uL 24 57* 26 74* 30 98* 26 38* 29 73* 23 82*  --  16 79*   HEMOGLOBIN g/dL 9 7* 10 1* 9 9* 11 7* 11 4* 11 7* 11 6* 11 6*   HEMATOCRIT % 30 2* 31 2* 29 9* 36 9 34 9* 36 2* 35 1* 35 6*   PLATELETS Thousands/uL 264 268 236 288 312 311  --  235   SODIUM mmol/L 136 133*  --  133* 138 137  --  137   POTASSIUM mmol/L 4 4 4 3  --  4 8 4 7 4 9  --  4 6   CHLORIDE mmol/L 101 96*  --  99* 101 100  --  102   CO2 mmol/L 29 24  --  23 28 27  --  28   BUN mg/dL 67* 100*  --  136* 108* 117*  --  142*   CREATININE mg/dL 5 41* 6 85*  --  6 68* 5 14* 4 92*  --  5 15*   CALCIUM mg/dL 8 1* 8 1*  --  7 6* 7 7* 8 0*  --  8 1*   MAGNESIUM mg/dL  --  2 7*  --   --   --   --   --  3 0*   PHOSPHORUS mg/dL  --  7 1*  --   --   --   --   --   --        US retroperitoneal complete   Final Result by Celso Conklin MD (12/27 2002)      No hydronephrosis  Asymmetric atrophy of the right kidney  Small left renal cysts  The bladder is decompressed and poorly evaluated  Workstation performed: [id number]         XR chest pa & lateral   Final Result by Jordy Galarza MD (12/27 1659)      Small left pleural effusion, decreased since thoracentesis, without pneumothorax              Workstation performed: [id number]         CT chest abdomen pelvis w contrast   Final Result by Lc Calero MD (12/27 1857)      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  Persistent intramural hematoma extending from the aortic root to the level of the distal descending aorta, measuring up to 8mm in thickness  Recent median sternotomy with 1 0 cm bony diastasis  Fluid collection in the retrosternal space measuring 5 2 x 2 3 x 10 5 cm could be postsurgical but superimposed infection cannot be excluded  Trace right and small left pleural effusions  Dense bibasilar consolidation representing atelectasis or aspiration  Nodular liver contour suggesting cirrhosis  Workstation performed: [id number]         IR thoracentesis   Final Result by Anita Singh MD (12/27 1611)   Technically successful ultrasound-guided thoracentesis  Workstation performed: [id number]         XR chest pa & lateral   Final Result by Anita Tobias MD (12/26 1509)      Status post thoracentesis, with diminished size of left effusion and no evidence of pneumothorax            Workstation performed: [id number]         IR thoracentesis   Final Result by Megan Michele MD (12/24 1622)   Impression:    Successful ultrasound-guided left thoracentesis  Workstation performed: [id number]         XR chest pa & lateral   Final Result by Anita Tobias MD (12/24 1433)      Increased size of left pleural effusion/underlying left basilar atelectasis or infiltrate  Workstation performed: [id number]         IR temp HD cath   Final Result by Camden Mo MD (12/23 1919)   Impression:   Ultrasound and fluoroscopically guided placement of a 20 cm 14-Polish nontunneled dialysis catheter via the right internal jugular vein  Catheter is ready for immediate use              Workstation performed: [id number]         XR chest pa & lateral   Final Result by Hernan Ray MD (12/20 7722)   Persistent small left basilar pleural effusion with lungs otherwise clear       Workstation performed: [id number]         XR chest portable   Final Result by Acacia Yao MD (04/35 9122)      Extubation  Mild bibasilar atelectasis  Expected appearance after cardiac surgery  Workstation performed: [id number]         XR chest portable ICU   Final Result by Gertrude Sainz MD (68/60 5259)      1  Status post sternotomy  Lines and support devices appear in position as noted above  Central fullness of the pulmonary vasculature may represent mild pulmonary vascular congestion  Workstation performed: [id number]         CTA dissection protocol chest abdomen pelvis w wo contrast   Final Result by Jordin Neville MD (12/14 9600)      1  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 tapering to 4 1 cm at the aortic arch  2   There is crescentic intermediate density along the aortic wall extending from the aortic root through the aortic arch and along the proximal descending thoracic aorta compatible with acute type A intramural hematoma  Emergent vascular consultation    is recommended  I personally discussed this study with Lorayne Gaucher on 12/14/2019 at 10:57 PM                   Workstation performed: [id number]         XR chest 2 views   Final Result by Valeriy Tompkins MD (12/15 9158)      Cardiomegaly   No acute consolidation or congestion            Workstation performed: [id number]             Portions of the record may have been created with voice recognition software  Occasional wrong word or "sound a like" substitutions may have occurred due to the inherent limitations of voice recognition software  Read the chart carefully and recognize, using context, where substitutions have occurred

## 2019-12-29 NOTE — PROGRESS NOTES
Progress Note - Infectious Disease   Oliver Sharpe 70 y o  male MRN: [id number]  Unit/Bed#: [facility] 406-01 Encounter: [id number]      Impression/Recommendations:  1   Leukocytosis   Likely multifactorial due to #2, dialysis, anemia  Procalcitonin elevated  CT shows retrosternal collection which is likely postsurgical   No evidence of incisional cellulitis/drainage or sternal instability  UA, LFTs, lipase, blood cultures, CT A/P negative   Consider gout given bilateral hallux pain and elevated uric acid although exam relatively bland   Consider other noninfectious etiologies   Clinically stable without fever  Improving with antibiotics  Rec:  ? Continue cefepime for now, likely 2 more days  ? Recheck procalcitonin, CBC in a m   ? Serial incisional exams  ? Follow temperatures closely  ? Supportive care as per the primary service     2  Bibasilar pneumonia    Consider due to aspiration   Consider also atelectasis   Sputum with Serratia   Status post left thoracentesis x2 with bland chemistries and negative cultures  Procalcitonin 10 98  Rec:  ? Continue cefepime as above  ? Recheck procalcitonin in a m   ? Continue  OOB, incentive spirometry  ? Follow respiratory status closely     3   Acute type A aortic dissection   Status post jordy arch reconstruction cannula aortic valve suspension using deck run graft 12/15  Rec:  ? Postoperative care per cardiac surgery     4   AFSHIN on CKD   In setting of recent surgery   Now on HD  Rec:  ? Dose adjust antibiotics for HD  ? Renal follow-up ongoing  ? As blood cultures negative, okay from ID for PermCath if clinically indicated     Antibiotics:  Cefepime #5    Subjective:  Patient seen on PM rounds  Feels much better today  Family visiting at bedside  Denies fevers, chills, sweats, nausea, vomiting, or diarrhea  24 Hour Events:  No documented fevers, chills, sweats, nausea, vomiting, or diarrhea  WBC trending downward      Objective:  Vitals:  Temp:  [97 4 °F (36 3 °C)-98 1 °F (36 7 °C)] 97 6 °F (36 4 °C)  HR:  [75-92] 80  Resp:  [16-20] 20  BP: ()/(51-70) 88/55  SpO2:  [89 %-96 %] 94 %  Temp (24hrs), Av 7 °F (36 5 °C), Min:97 4 °F (36 3 °C), Max:98 1 °F (36 7 °C)  Current: Temperature: 97 6 °F (36 4 °C)    Physical Exam:   General:  No acute distress  Psychiatric:  Awake and alert, smiling and laughing with visitors  Pulmonary:  Normal respiratory excursion without accessory muscle use  Abdomen:  Soft, nontender  Extremities:  No edema  Skin:  No rashes    Lab Results:  I have personally reviewed pertinent labs  Results from last 7 days   Lab Units 195 19  0455 19  0522 19  1014   POTASSIUM mmol/L 4 4 4 3 4 8  --    CHLORIDE mmol/L 101 96* 99*  --    CO2 mmol/L 29 24 23  --    BUN mg/dL 67* 100* 136*  --    CREATININE mg/dL 5 41* 6 85* 6 68*  --    EGFR ml/min/1 73sq m 9 7 7  --    CALCIUM mg/dL 8 1* 8 1* 7 6*  --    AST U/L 32  --   --  66*   ALT U/L 21  --   --  26   ALK PHOS U/L 154*  --   --  168*     Results from last 7 days   Lab Units 19  0435 19  0455 19  0516   WBC Thousand/uL 24 57* 26 74* 30 98*   HEMOGLOBIN g/dL 9 7* 10 1* 9 9*   PLATELETS Thousands/uL 264 268 236     Results from last 7 days   Lab Units 19  1529 19  1010 19  1624 19  0280   BLOOD CULTURE   --   --  No Growth at 48 hrs  --    SPUTUM CULTURE   --   --   --  3+ Growth of Serratia marcescens*   GRAM STAIN RESULT  1+ Polys  No organisms seen 2+ Polys  No bacteria seen  --  No polys seen*  4+ Gram negative rods*   BODY FLUID CULTURE, STERILE  No growth No growth  --   --        Imaging Studies:   I have personally reviewed pertinent imaging study reports and images in PACS  EKG, Pathology, and Other Studies:   I have personally reviewed pertinent reports

## 2019-12-30 ENCOUNTER — APPOINTMENT (INPATIENT)
Dept: DIALYSIS | Facility: HOSPITAL | Age: 79
DRG: 219 | End: 2019-12-30
Payer: COMMERCIAL

## 2019-12-30 PROBLEM — E83.39 HYPERPHOSPHATEMIA: Status: ACTIVE | Noted: 2019-12-30

## 2019-12-30 PROBLEM — N17.9 AKI (ACUTE KIDNEY INJURY) (HCC): Status: ACTIVE | Noted: 2019-12-30

## 2019-12-30 PROBLEM — I95.9 HYPOTENSION: Status: ACTIVE | Noted: 2019-12-30

## 2019-12-30 LAB
ANION GAP SERPL CALCULATED.3IONS-SCNC: 10 MMOL/L (ref 4–13)
ANISOCYTOSIS BLD QL SMEAR: PRESENT
BACTERIA SPEC BFLD CULT: NO GROWTH
BASOPHILS # BLD MANUAL: 0 THOUSAND/UL (ref 0–0.1)
BASOPHILS NFR MAR MANUAL: 0 % (ref 0–1)
BUN SERPL-MCNC: 82 MG/DL (ref 5–25)
CALCIUM SERPL-MCNC: 8 MG/DL (ref 8.3–10.1)
CHLORIDE SERPL-SCNC: 101 MMOL/L (ref 100–108)
CO2 SERPL-SCNC: 26 MMOL/L (ref 21–32)
CREAT SERPL-MCNC: 6.46 MG/DL (ref 0.6–1.3)
EOSINOPHIL # BLD MANUAL: 0.43 THOUSAND/UL (ref 0–0.4)
EOSINOPHIL NFR BLD MANUAL: 2 % (ref 0–6)
ERYTHROCYTE [DISTWIDTH] IN BLOOD BY AUTOMATED COUNT: 14.4 % (ref 11.6–15.1)
GFR SERPL CREATININE-BSD FRML MDRD: 8 ML/MIN/1.73SQ M
GLUCOSE SERPL-MCNC: 108 MG/DL (ref 65–140)
GLUCOSE SERPL-MCNC: 155 MG/DL (ref 65–140)
GLUCOSE SERPL-MCNC: 180 MG/DL (ref 65–140)
GLUCOSE SERPL-MCNC: 93 MG/DL (ref 65–140)
GRAM STN SPEC: NORMAL
GRAM STN SPEC: NORMAL
HBV CORE AB SER QL: NORMAL
HBV CORE IGM SER QL: NORMAL
HBV SURFACE AG SER QL: NORMAL
HCT VFR BLD AUTO: 30.3 % (ref 36.5–49.3)
HCV AB SER QL: NORMAL
HGB BLD-MCNC: 9.7 G/DL (ref 12–17)
INR PPP: 1.82 (ref 0.84–1.19)
LYMPHOCYTES # BLD AUTO: 0.65 THOUSAND/UL (ref 0.6–4.47)
LYMPHOCYTES # BLD AUTO: 3 % (ref 14–44)
MACROCYTES BLD QL AUTO: PRESENT
MCH RBC QN AUTO: 30.1 PG (ref 26.8–34.3)
MCHC RBC AUTO-ENTMCNC: 32 G/DL (ref 31.4–37.4)
MCV RBC AUTO: 94 FL (ref 82–98)
MONOCYTES # BLD AUTO: 1.94 THOUSAND/UL (ref 0–1.22)
MONOCYTES NFR BLD: 9 % (ref 4–12)
NEUTROPHILS # BLD MANUAL: 18.58 THOUSAND/UL (ref 1.85–7.62)
NEUTS SEG NFR BLD AUTO: 86 % (ref 43–75)
NRBC BLD AUTO-RTO: 0 /100 WBCS
PLATELET # BLD AUTO: 281 THOUSANDS/UL (ref 149–390)
PLATELET BLD QL SMEAR: ADEQUATE
PMV BLD AUTO: 9.5 FL (ref 8.9–12.7)
POLYCHROMASIA BLD QL SMEAR: PRESENT
POTASSIUM SERPL-SCNC: 4.2 MMOL/L (ref 3.5–5.3)
PROCALCITONIN SERPL-MCNC: 4.82 NG/ML
PROTHROMBIN TIME: 20.6 SECONDS (ref 11.6–14.5)
RBC # BLD AUTO: 3.22 MILLION/UL (ref 3.88–5.62)
RBC MORPH BLD: PRESENT
SODIUM SERPL-SCNC: 137 MMOL/L (ref 136–145)
WBC # BLD AUTO: 21.6 THOUSAND/UL (ref 4.31–10.16)

## 2019-12-30 PROCEDURE — 94760 N-INVAS EAR/PLS OXIMETRY 1: CPT

## 2019-12-30 PROCEDURE — G8987 SELF CARE CURRENT STATUS: HCPCS

## 2019-12-30 PROCEDURE — 85027 COMPLETE CBC AUTOMATED: CPT | Performed by: PHYSICIAN ASSISTANT

## 2019-12-30 PROCEDURE — 85610 PROTHROMBIN TIME: CPT | Performed by: PHYSICIAN ASSISTANT

## 2019-12-30 PROCEDURE — 94640 AIRWAY INHALATION TREATMENT: CPT

## 2019-12-30 PROCEDURE — 87340 HEPATITIS B SURFACE AG IA: CPT | Performed by: INTERNAL MEDICINE

## 2019-12-30 PROCEDURE — 80048 BASIC METABOLIC PNL TOTAL CA: CPT | Performed by: PHYSICIAN ASSISTANT

## 2019-12-30 PROCEDURE — 86803 HEPATITIS C AB TEST: CPT | Performed by: INTERNAL MEDICINE

## 2019-12-30 PROCEDURE — G8988 SELF CARE GOAL STATUS: HCPCS

## 2019-12-30 PROCEDURE — 99024 POSTOP FOLLOW-UP VISIT: CPT | Performed by: THORACIC SURGERY (CARDIOTHORACIC VASCULAR SURGERY)

## 2019-12-30 PROCEDURE — 86705 HEP B CORE ANTIBODY IGM: CPT | Performed by: INTERNAL MEDICINE

## 2019-12-30 PROCEDURE — 90935 HEMODIALYSIS ONE EVALUATION: CPT | Performed by: INTERNAL MEDICINE

## 2019-12-30 PROCEDURE — 84165 PROTEIN E-PHORESIS SERUM: CPT | Performed by: PATHOLOGY

## 2019-12-30 PROCEDURE — 84165 PROTEIN E-PHORESIS SERUM: CPT | Performed by: INTERNAL MEDICINE

## 2019-12-30 PROCEDURE — 99232 SBSQ HOSP IP/OBS MODERATE 35: CPT | Performed by: INTERNAL MEDICINE

## 2019-12-30 PROCEDURE — 85007 BL SMEAR W/DIFF WBC COUNT: CPT | Performed by: PHYSICIAN ASSISTANT

## 2019-12-30 PROCEDURE — 86704 HEP B CORE ANTIBODY TOTAL: CPT | Performed by: INTERNAL MEDICINE

## 2019-12-30 PROCEDURE — 86334 IMMUNOFIX E-PHORESIS SERUM: CPT | Performed by: INTERNAL MEDICINE

## 2019-12-30 PROCEDURE — 84145 PROCALCITONIN (PCT): CPT | Performed by: INTERNAL MEDICINE

## 2019-12-30 PROCEDURE — 97168 OT RE-EVAL EST PLAN CARE: CPT

## 2019-12-30 PROCEDURE — 86255 FLUORESCENT ANTIBODY SCREEN: CPT | Performed by: INTERNAL MEDICINE

## 2019-12-30 PROCEDURE — 82948 REAGENT STRIP/BLOOD GLUCOSE: CPT

## 2019-12-30 PROCEDURE — 86038 ANTINUCLEAR ANTIBODIES: CPT | Performed by: INTERNAL MEDICINE

## 2019-12-30 PROCEDURE — 86334 IMMUNOFIX E-PHORESIS SERUM: CPT | Performed by: PATHOLOGY

## 2019-12-30 PROCEDURE — 83520 IMMUNOASSAY QUANT NOS NONAB: CPT | Performed by: INTERNAL MEDICINE

## 2019-12-30 RX ORDER — WARFARIN SODIUM 1 MG/1
1 TABLET ORAL
Status: COMPLETED | OUTPATIENT
Start: 2019-12-30 | End: 2019-12-30

## 2019-12-30 RX ADMIN — INSULIN LISPRO 1 UNITS: 100 INJECTION, SOLUTION INTRAVENOUS; SUBCUTANEOUS at 17:10

## 2019-12-30 RX ADMIN — ONDANSETRON 4 MG: 2 INJECTION INTRAMUSCULAR; INTRAVENOUS at 06:24

## 2019-12-30 RX ADMIN — POLYETHYLENE GLYCOL 3350 17 G: 17 POWDER, FOR SOLUTION ORAL at 08:51

## 2019-12-30 RX ADMIN — GUAIFENESIN 600 MG: 600 TABLET, EXTENDED RELEASE ORAL at 21:58

## 2019-12-30 RX ADMIN — MIDODRINE HYDROCHLORIDE 10 MG: 5 TABLET ORAL at 12:57

## 2019-12-30 RX ADMIN — LEVALBUTEROL HYDROCHLORIDE 1.25 MG: 1.25 SOLUTION, CONCENTRATE RESPIRATORY (INHALATION) at 07:18

## 2019-12-30 RX ADMIN — METOPROLOL TARTRATE 12.5 MG: 25 TABLET ORAL at 21:58

## 2019-12-30 RX ADMIN — WARFARIN SODIUM 1 MG: 1 TABLET ORAL at 17:10

## 2019-12-30 RX ADMIN — ISODIUM CHLORIDE 3 ML: 0.03 SOLUTION RESPIRATORY (INHALATION) at 13:49

## 2019-12-30 RX ADMIN — SENNOSIDES AND DOCUSATE SODIUM 1 TABLET: 8.6; 5 TABLET ORAL at 17:09

## 2019-12-30 RX ADMIN — ISODIUM CHLORIDE 3 ML: 0.03 SOLUTION RESPIRATORY (INHALATION) at 07:18

## 2019-12-30 RX ADMIN — MIDODRINE HYDROCHLORIDE 10 MG: 5 TABLET ORAL at 17:10

## 2019-12-30 RX ADMIN — MIDODRINE HYDROCHLORIDE 10 MG: 5 TABLET ORAL at 06:24

## 2019-12-30 RX ADMIN — SENNOSIDES AND DOCUSATE SODIUM 1 TABLET: 8.6; 5 TABLET ORAL at 08:51

## 2019-12-30 RX ADMIN — PANTOPRAZOLE SODIUM 40 MG: 40 TABLET, DELAYED RELEASE ORAL at 06:38

## 2019-12-30 RX ADMIN — ISODIUM CHLORIDE 3 ML: 0.03 SOLUTION RESPIRATORY (INHALATION) at 20:32

## 2019-12-30 RX ADMIN — LEVALBUTEROL HYDROCHLORIDE 1.25 MG: 1.25 SOLUTION, CONCENTRATE RESPIRATORY (INHALATION) at 20:32

## 2019-12-30 RX ADMIN — CEFEPIME HYDROCHLORIDE 1000 MG: 1 INJECTION, POWDER, FOR SOLUTION INTRAMUSCULAR; INTRAVENOUS at 12:56

## 2019-12-30 RX ADMIN — INSULIN LISPRO 1 UNITS: 100 INJECTION, SOLUTION INTRAVENOUS; SUBCUTANEOUS at 21:58

## 2019-12-30 RX ADMIN — LEVALBUTEROL HYDROCHLORIDE 1.25 MG: 1.25 SOLUTION, CONCENTRATE RESPIRATORY (INHALATION) at 13:49

## 2019-12-30 RX ADMIN — GUAIFENESIN 600 MG: 600 TABLET, EXTENDED RELEASE ORAL at 08:51

## 2019-12-30 RX ADMIN — ASPIRIN 81 MG 81 MG: 81 TABLET ORAL at 08:51

## 2019-12-30 RX ADMIN — METOPROLOL TARTRATE 12.5 MG: 25 TABLET ORAL at 08:51

## 2019-12-30 NOTE — PROGRESS NOTES
Progress Note - Cardiothoracic Surgery   [facility] 70 y o  male MRN: [id number]  Unit/Bed#: [facility] 406-01 Encounter: [id number]      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, Ascending aortic aneurysm     S/P Replacement of ascending aorta with aggressive hemiarch reconstruction and aortic valve resuspension with a 26 mm Dacron graft; POD # 15      24 Hour Events: No events overnight  Feels better today, reports nausea has resolved  Denies CP or SOB  Allean Mayans to go home      Medications:   Scheduled Meds:  Current Facility-Administered Medications:  acetaminophen 650 mg Oral Q4H PRN Hilda Redd PA-C    aspirin 81 mg Oral Daily Sumaya Perry PA-C    bisacodyl 10 mg Rectal Daily PRN Ez Linder PA-C    cefepime 1,000 mg Intravenous Q24H [place], [place] Last Rate: 1,000 mg (12/29/19 1652)   guaiFENesin 600 mg Oral Q12H [address] Sumaya Perry PA-C    insulin lispro 1-5 Units Subcutaneous TID AC Alex Linder PA-C    insulin lispro 1-5 Units Subcutaneous HS Alex Linder PA-C    iohexol 50 mL Oral 90 min [facility]    levalbuterol 1 25 mg Nebulization TID Akanksha Matos DO    menthol-methyl salicylate  Apply externally 4x Daily PRN Ann-Marie Hdez PA-C    metoclopramide 5 mg Intravenous Q8H PRN Parker Lopez PA-C    metoprolol tartrate 12 5 mg Oral Q12H [address] Rigoberto Baer MD    midodrine 10 mg Oral TID AC Mame Horn MD    ondansetron 4 mg Intravenous Q6H Renea Bearden PA-C    oxyCODONE-acetaminophen 1 tablet Oral Q4H PRN Hilda Redd PA-C    oxyCODONE-acetaminophen 2 tablet Oral Q6H PRN Ez Linder PA-C    pantoprazole 40 mg Oral Daily Alex Linder PA-C    polyethylene glycol 17 g Oral Daily Alex Sullivan PA-C    senna-docusate sodium 1 tablet Oral BID Parekr Weaver PA-C    sodium chloride 3 mL Nebulization TID Akanksha Matos DO    temazepam 15 mg Oral HS PRN Ez Linder PA-C      Continuous Infusions:   PRN Meds:   acetaminophen  Carlos Witt bisacodyl    menthol-methyl salicylate    metoclopramide    oxyCODONE-acetaminophen    oxyCODONE-acetaminophen    temazepam    Vitals:   Vitals:    12/29/19 2313 12/30/19 0256 12/30/19 0633 12/30/19 0700   BP:  113/60  120/61   BP Location:  Left arm  Left arm   Pulse:  65  66   Resp:  16  20   Temp:  97 6 °F (36 4 °C)  98 2 °F (36 8 °C)   TempSrc:  Oral  Oral   SpO2: 93% 92%  95%   Weight:   80 5 kg (177 lb 7 5 oz)    Height:           Telemetry: NSR w/ PVC; Heart Rate: 71    Respiratory:   SpO2: SpO2: 95 %; 4 LPM    Intake/Output:   I/O       12/28 0701 - 12/29 0700 12/29 0701 - 12/30 0700 12/30 0701 - 12/31 0700    P  O  720 1050     I V  (mL/kg) 500 (6 3)      Total Intake(mL/kg) 1220 (15 3) 1050 (13)     Urine (mL/kg/hr) 100 (0 1) 450 (0 2)     Emesis/NG output 0      Other 301      Stool 0      Total Output 401 450     Net +819 +600            Unmeasured Urine Occurrence 2 x      Unmeasured Stool Occurrence 3 x      Unmeasured Emesis Occurrence 1 x                Weights:   Weight (last 2 days)     Date/Time   Weight    12/30/19 0633   80 5 (177 47)    12/29/19 0640   80 (176 37)    12/28/19 0600   79 8 (175 93)            Admit weight: 83 5 klg    Results:   Results from last 7 days   Lab Units 12/30/19 0441 12/29/19  0435 12/28/19  0455   WBC Thousand/uL 21 60* 24 57* 26 74*   HEMOGLOBIN g/dL 9 7* 9 7* 10 1*   HEMATOCRIT % 30 3* 30 2* 31 2*   PLATELETS Thousands/uL 281 264 268     Results from last 7 days   Lab Units 12/30/19 0441 12/29/19  0435 12/28/19  0455   SODIUM mmol/L 137 136 133*   POTASSIUM mmol/L 4 2 4 4 4 3   CHLORIDE mmol/L 101 101 96*   CO2 mmol/L 26 29 24   BUN mg/dL 82* 67* 100*   CREATININE mg/dL 6 46* 5 41* 6 85*   CALCIUM mg/dL 8 0* 8 1* 8 1*     Results from last 7 days   Lab Units 12/30/19  0441 12/29/19  0435 12/28/19  0455   INR  1 82* 1 87* 1 83*       Coumadin mg - 1 1 0 0 0     Procalcitonin 4 82 (10 98)    Point of care glucose: BS 93 - 149   No SSIC required    Studies:  No studies past 24 hrs      Invasive Lines/Tubes:  Invasive Devices     Peripheral Intravenous Line            Peripheral IV 12/29/19 Left Arm less than 1 day          Hemodialysis Catheter            HD Temporary Double Catheter 6 days                Physical Exam:    HEENT/NECK:  PERRLA  No jugular venous distention  Cardiac: Regular rate and rhythm and No murmurs/rubs/gallops  Pulmonary:  Breath sounds clear bilaterally and No rales/rhonchi/wheezes  Abdomen:  Non-tender, Non-distended and Normal bowel sounds  Incisions: Sternum is stable  Incision is clean, dry, and intact  and Axillary cannulation site c/d/i  Extremities: Extremities warm/dry, Radial pulses 2+ bilaterally and No edema B/L  Neuro: Alert and oriented X 3, Sensation is grossly intact and No focal deficits  Skin: Warm/Dry, without rashes or lesions  Assessment:  Principal Problem:    Dissection of thoracic aorta (HCC)  Active Problems:    Benign essential hypertension    Abnormal TSH    Benign hypertension with CKD (chronic kidney disease) stage III (HCC)    Benign prostatic hyperplasia without lower urinary tract symptoms    S/P aorta repair    Leukocytosis    Thrombocytopenia (HCC)    Postoperative anemia due to acute blood loss    Anticoagulation goal of INR 2 to 3       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, Ascending aortic aneurysm     S/P Replacement of ascending aorta with aggressive hemiarch reconstruction and aortic valve resuspension with a 26 mm Dacron graft; POD # 15    Plan:    1  Cardiac:   NSR; HR/BP well-controlled  Lopressor, 12 5mg PO BID   Midodrine 10mg TID  Continue ASA and Statin therapy  Epicardial pacing wires out  Patient no longer has central IV access   Continue DVT prophylaxis    2  Pulmonary:   Acute post-op pulmonary insufficiency; Requiring 4 liters via nasal cannla, secondary to atelectasis and pneumonia  Continue incentive spirometry/coughing/deep breathing exercises    Wean supplemental oxygen as tolerated for saturation > 90%  Chest tubes have been discontinued    3  Renal:   Intake/Output net: +600 mL/24 hours  AFSHIN on CKD 3 - Post op Creatinine increased, Renal following  To assess need for HD on daily basis  ? eventual need for Permacath placement  4  Neuro:  Neurologically intact; No active issues  Incisional pain well-controlled; Continue prn Percocet    5  GI:  Tolerating TLC 2 3 gm sodium diet  Maintain 1800 mL daily fluid restriction   Continue stool softeners and prn suppository  Continue GI prophylaxis    6  Endo:   No history of diabetes; Glucose well-controlled with sliding scale coverage    7    Hematology:    Post-operative acute blood loss anemia; Hemoglobin 9 7; trend prn   Leukocytosis -WBC downtrending, afebrile  ID following  ID: WBC downtrending, afebrile, possible aspiration pneumonia vs atelectasis, sputum positive for Serratia, ID following  continue cefepime x 2 more days  Procalcitonin downtrending  S/P thoracentesis x 2 - fluid negative and blood culture negative to date    8  Disposition:      Awaiting rehab placement when medically cleared - possibly by Thursday    VTE Pharmacologic Prophylaxis: Warfarin (Coumadin)  VTE Mechanical Prophylaxis: sequential compression device    Collaborative rounds completed with CHIDI Segal    Plan of care discussed with bedside nurse    SIGNATURE: Gilson Rollins PA-C  DATE: 2019  TIME: 7:17 AM

## 2019-12-30 NOTE — PHYSICAL THERAPY NOTE
Physical Therapy Cancellation Note    ATTEMPTED TO SEE PATIENT THIS AM  PATIENT IS CURRENTLY RECEIVING HEMODIALYSIS TREATMENT  PT WILL CONTINUE TO FOLLOW AND TREAT AS APPROPRIATE      Edwardo Crespo PT, DPT

## 2019-12-30 NOTE — PLAN OF CARE
Problem: OCCUPATIONAL THERAPY ADULT  Goal: Performs self-care activities at highest level of function for planned discharge setting  See evaluation for individualized goals  Description  Treatment Interventions: ADL retraining, Functional transfer training, Endurance training, Patient/family training, Equipment evaluation/education, Compensatory technique education, Continued evaluation, Cardiac education, Energy conservation, Activityengagement          See flowsheet documentation for full assessment, interventions and recommendations  12/30/2019 1506 by Gerardo Florez OT  Note:   Limitation: Decreased ADL status, Decreased endurance, Decreased self-care trans, Decreased high-level ADLs  Prognosis: Good  Assessment: Pt seen for re-eval 2* increased fatigue and functioning below baseline  Pt originally admit for "w/ dissection of thoracic aorta w/ intramural hematoma s/p replacement of ascending aorta w/ aggressive hemiarch reconstruction and aortic valve suspension on 12/15/19"  Currently pt is Min A for UB ADls and functional mobility  Pt is Mod A for LB ADLs  Pt is limited 2* decreased activity tolerance, decreased ADL status, and decreased endurance  These impair baseline fxn in dressing, bathing, grooming, toileting, leisure participation, household management, and functional mobility  From an OT standpoint anticipate d/c to [facility]  OT will continue to follow to see as able       OT Discharge Recommendation: Short Term Rehab  OT - OK to Discharge: Yes    12/30/2019 1505 by Gerardo Florez OT  Reactivated

## 2019-12-30 NOTE — SOCIAL WORK
CM was informed pt will be a new-start recipient of Hemodialysis (HD) and will require ongoing o/p HD treatment for his aftercare plan  CM met w/ pt to discuss this and pt understands importance of HD  CM provided pt w/ freedom of choice for o/p HD treatment centers  Pt requested referral to [address]      ANTONIO manually faxed referral to [place] (f: [phone]) and requested a chair time at [address]      ANTONIO to follow

## 2019-12-30 NOTE — PLAN OF CARE
3 5 hour hemodialysis treatment planned for today with 0 5-1 liter ultrafiltration goal to optimize fluid and electrolyte balance     Problem: METABOLIC, FLUID AND ELECTROLYTES - ADULT  Goal: Electrolytes maintained within normal limits  Description  INTERVENTIONS:  - Monitor labs and assess patient for signs and symptoms of electrolyte imbalances  - Administer electrolyte replacement as ordered  - Monitor response to electrolyte replacements, including repeat lab results as appropriate  - Instruct patient on fluid and nutrition as appropriate  Outcome: Progressing  Goal: Fluid balance maintained  Description  INTERVENTIONS:  - Monitor labs   - Monitor I/O and WT  - Instruct patient on fluid and nutrition as appropriate  - Assess for signs & symptoms of volume excess or deficit  Outcome: Progressing

## 2019-12-30 NOTE — HEMODIALYSIS
Pt stable on 3 1/2 hr HD tx  Pre tx wt- 80 2 kg, post wt-79 5 kg  Total UF removal- 500 ml  System clotted twice on tx  SUE Hammond aware

## 2019-12-30 NOTE — OCCUPATIONAL THERAPY NOTE
OccupationalTherapy Re-Evaluation     Patient Name: Christiane Reyes Date: 12/30/2019  Problem List  Principal Problem:    Dissection of thoracic aorta ([facility] [address] )  Active Problems:    Benign essential hypertension    Abnormal TSH    Benign hypertension with CKD (chronic kidney disease) stage III (HCC)    Benign prostatic hyperplasia without lower urinary tract symptoms    S/P aorta repair    Leukocytosis    Thrombocytopenia (HCC)    Postoperative anemia due to acute blood loss    Anticoagulation goal of INR 2 to 3    AFSHIN (acute kidney injury) ([address] )    Hyperphosphatemia    Hypotension    Past Medical History  Past Medical History:   Diagnosis Date    Arthritis     BPH without urinary obstruction     CKD (chronic kidney disease), stage III (HCC)     baseline 1 6-1 7    GERD (gastroesophageal reflux disease)     Hyperlipidemia     Hypertension      Past Surgical History  Past Surgical History:   Procedure Laterality Date    APPENDECTOMY      COLONOSCOPY  2017    FRACTURE SURGERY      IR TEMP HD CATH  12/23/2019    IR THORACENTESIS  12/24/2019    IR THORACENTESIS  12/27/2019    LA ASCEND AORTA GRAFT W ROOT REPLACMENT  VALVE CONDUIT/CORON RECONSTRUCT N/A 12/15/2019    Procedure: BENTALL PROCEDURE (ASCENDING AORTIC REPAIR) with 26mm Gelweave Graft;  Surgeon: Kathryn Bass DO;  [facility];  Service: Cardiac Surgery    LA RECONSTR TOTAL SHOULDER IMPLANT Right 12/5/2017    Procedure: ARTHROPLASTY SHOULDER REVERSE with OPEN CYST EXCISION;  Surgeon: Davis Price MD;  [facility]: [facility];  Service: Orthopedics    SHOULDER SURGERY      WRIST SURGERY           12/30/19 0942   Note Type   Note type Re-eval   Restrictions/Precautions   Weight Bearing Precautions Per Order No   Other Precautions Cardiac/sternal;Multiple lines;Telemetry   Pain Assessment   Pain Assessment No/denies pain   Pain Score No Pain   Home Living   Type of Home Apartment   Home Layout One level   Bathroom Shower/Tub Walk-in shower   Bathroom Toilet Raised   Bathroom Equipment Grab bars in shower   Prior Function   Level of [place] Independent with ADLs and functional mobility   Lives With Spouse   Receives Help From Family   ADL Assistance Independent   IADLs Needs assistance   Falls in the last 6 months 0   Vocational Retired   Lifestyle   Autonomy pta pt reports I in ADLs, shares IADLs, and I for functional mobility   Reciprocal Relationships supportive family; someone will be able to assist PRN upon d/c   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   Intrinsic Gratification likes doing  work   Psychosocial   Psychosocial (WDL) [address] "I'm just tired"   ADL   Where Assessed Chair   Eating Assistance 5  Supervision/Setup   Grooming Assistance 5  Supervision/Setup   UB [address] 4  Minimal Assistance   LB [address] 3  Moderate Assistance   [address] 4  Minimal Assistance   LB Dressing Assistance 3  Moderate Assistance   Transfers   Sit to Stand 4  Minimal assistance   Additional items Assist x 1   Stand to Sit 4  Minimal assistance   Additional items Assist x 1   Functional Mobility   Functional Mobility 4  Minimal assistance   Additional items Rolling walker   Balance   Static Sitting Fair +   Dynamic Sitting Fair   Static Standing Fair -   Dynamic Standing Poor +   Ambulatory Poor +   Activity Tolerance   Activity Tolerance Patient limited by fatigue   Medical Staff Made Aware CM aware; restorative [place]   Nurse Made Aware okay to see per RN   RUE Assessment   RUE Assessment WFL   LUE Assessment   LUE Assessment WFL   Hand Function   Gross Motor Coordination Functional   Fine Motor Coordination Functional   Cognition   Overall Cognitive Status WFL   Arousal/Participation Cooperative   Attention Attends with cues to redirect   Orientation Level Oriented X4   Memory Within functional limits   Following Commands Follows one step commands with increased time or repetition Comments pt pleasant and cooperative   Assessment   Limitation Decreased ADL status; Decreased endurance;Decreased self-care trans;Decreased high-level ADLs   Prognosis Good   Assessment Pt seen for re-eval 2* increased fatigue and functioning below baseline  Pt originally admit for "w/ dissection of thoracic aorta w/ intramural hematoma s/p replacement of ascending aorta w/ aggressive hemiarch reconstruction and aortic valve suspension on 12/15/19"  Currently pt is Min A for UB ADls and functional mobility  Pt is Mod A for LB ADLs  Pt is limited 2* decreased activity tolerance, decreased ADL status, and decreased endurance  These impair baseline fxn in dressing, bathing, grooming, toileting, leisure participation, household management, and functional mobility  From an OT standpoint anticipate d/c to [facility]  OT will continue to follow to see as able  Goals   Patient Goals get stronger   LTG Time Frame 10-14   Plan   Treatment Interventions ADL retraining;Functional transfer training; Endurance training;Patient/family training;Equipment evaluation/education; Compensatory technique education;Continued evaluation; Energy conservation; Activityengagement;Cardiac education   Goal Expiration Date 01/13/20   OT Treatment Day 3   OT Frequency 3-5x/wk   Recommendation   OT Discharge Recommendation Short Term Rehab   OT - OK to Discharge Yes   Barthel Index   Feeding 10   Bathing 0   Grooming Score 5   Dressing Score 5   Bladder Score 10   Bowels Score 10   Toilet Use Score 5   Transfers (Bed/Chair) Score 10   Mobility (Level Surface) Score 0   Stairs Score 0   Barthel Index Score 55   Modified Emmet Scale   Modified Emmet Scale 4     GOALS    1) Pt will increase activity tolerance to G for 30 min txment sessions    2) Pt will complete UB/LB dressing/self care w/ mod I using adaptive device and DME as needed    3) Pt will complete bathing w/ Mod I w/ use of AE and DME as needed    4) Pt will complete toileting w/ mod I w/ G hygiene/thoroughness using DME as needed    5) Pt will improve functional transfers to Mod I on/off all surfaces using DME as needed w/ G balance/safety     6) Pt will improve functional mobility during ADL/IADL/leisure tasks to Mod I using DME as needed w/ G balance/safety     7) Pt will participate in simulated IADL management task to increase independence to  w/ G safety and endurance    8) Pt will demonstrate G carryover of pt/caregiver education and training as appropriate      9) Pt will demonstrate 100% carryover of energy conservation techniques t/o functional I/ADL/leisure tasks w/o cues s/p skilled education      Luis Eduardo Mir MS, OTR/L

## 2019-12-30 NOTE — PROGRESS NOTES
NEPHROLOGY PROGRESS NOTE   Oliver Sharpe 70 y o  male MRN: [id number]  Unit/Bed#: [facility] 406-01 Encounter: [id number]      ASSESSMENT/PLAN:  1  Acute kidney injury:  Suspect postoperative ATN and contrast nephropathy with CTA on admission   · Creatinine 1 8 on admission and then slowly worsened and he was started on HD on 12/23  · S/p HD x 4 treatments  · Will dialyze again today as creatinine worsening up to 6 4 today   · Need accurate I/O as patient states he was urinating throughout the day but this is not documented   · Pending workup: hep panel, SPEP/UPEP, anti GBM, ANCA, AMY  · C3 low, C4 normal   · Access: temp HD catheter placed 12/23  · Will need change to permacath if still needs HD this week  · Continue to follow BMP and urine output for signs of recovery  2  CKD III: baseline creatinine around 1 4-1 8 and follows with Dr Jenn Daniels in our office   · Has atrophic R kidney   3  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/p repair 12/15  4  Anemia: hgb stable at 9 7  5  Hyperphosphatemia:  Phosphorus 7 1  · Low phosphorus diet  6  Hypotension: BP was low this admission and is midodrine 10 mg t i d   Also on metoprolol 12 5 mg b i d  With hold parameters  · Recent echocardiogram showed no pericardial effusion  7  Pneumonia:  On cefepime  8  Left pleural effusion:  Status post thoracentesis on 12/24 and 12/27        SUBJECTIVE:  Feeling better than yesterday  No current chest pain or shortness of breath  He thinks he is urinating okay  No nausea, vomiting or diarrhea      OBJECTIVE:  Current Weight: Weight - Scale: 80 5 kg (177 lb 7 5 oz)  Vitals:    12/30/19 0718   BP:    Pulse:    Resp:    Temp:    SpO2: 96%       Intake/Output Summary (Last 24 hours) at 12/30/2019 0852  Last data filed at 12/30/2019 9115  Gross per 24 hour   Intake 800 ml   Output 450 ml   Net 350 ml     General:  No acute distress  Skin:  No rash  Eyes:  Sclerae anicteric  ENT:  Moist mucous membranes  Neck:  Trachea midline Chest:  Decreased breath sounds bilaterally  CVS:  Regular rate and rhythm  Abdomen:  Soft, nontender, nondistended  Extremities:  No edema  Neuro:  Awake and alert  Psych:  Appropriate affect        Medications:  Scheduled Meds:  Current Facility-Administered Medications:  acetaminophen 650 mg Oral Q4H PRN [employer], PA-C    aspirin 81 mg Oral Daily Sumaya Perry PA-C    bisacodyl 10 mg Rectal Daily PRN Elsie Linder PA-C    cefepime 1,000 mg Intravenous Q24H Foreign Mcgarry PA-C Last Rate: 1,000 mg (12/29/19 1652)   guaiFENesin 600 mg Oral Q12H [facility] Sumaya Perry PA-C    insulin lispro 1-5 Units Subcutaneous TID AC [facility] TRINY Linder    insulin lispro 1-5 Units Subcutaneous HS [facility] TRINY Linder    iohexol 50 mL Oral 90 min pre-procedure TRINY Rayo    levalbuterol 1 25 mg Nebulization TID Sherry Music, DO    menthol-methyl salicylate  Apply externally 4x Daily PRN Ann-Marie Hdez PA-C    metoclopramide 5 mg Intravenous Q8H PRN Foreign Mcgarry PA-C    metoprolol tartrate 12 5 mg Oral Q12H [facility] Shania aBrton MD    midodrine 10 mg Oral TID  Christian Dhaliwal MD    ondansetron 4 mg Intravenous Q6H Erika James PA-C    oxyCODONE-acetaminophen 1 tablet Oral Q4H PRN [employer], PA-C    oxyCODONE-acetaminophen 2 tablet Oral Q6H PRN Elsie Linder PA-C    pantoprazole 40 mg Oral Daily [facility]    polyethylene glycol 17 g Oral Daily [facility] M Cross[facility]    senna-docusate sodium 1 tablet Oral BID Foreign Weaver PA-C    sodium chloride 3 mL Nebulization TID Sherry Music, DO    temazepam 15 mg Oral HS PRN [employer], PA-C    warfarin 1 mg Oral Once (warfarin) TRINY Rayo        PRN Meds:   acetaminophen    bisacodyl    menthol-methyl salicylate    metoclopramide    oxyCODONE-acetaminophen    oxyCODONE-acetaminophen    temazepam    Laboratory Results:  Results from last 7 days   Lab Units 12/30/19  0441 12/29/19  0435 12/28/19  0455 12/27/19  2750 12/26/19  0522 12/25/19  0442 12/24/19  0532   WBC Thousand/uL 21 60* 24 57* 26 74* 30 98* 26 38* 29 73* 23 82*   HEMOGLOBIN g/dL 9 7* 9 7* 10 1* 9 9* 11 7* 11 4* 11 7*   HEMATOCRIT % 30 3* 30 2* 31 2* 29 9* 36 9 34 9* 36 2*   PLATELETS Thousands/uL 281 264 268 236 288 312 311   SODIUM mmol/L 137 136 133*  --  133* 138 137   POTASSIUM mmol/L 4 2 4 4 4 3  --  4 8 4 7 4 9   CHLORIDE mmol/L 101 101 96*  --  99* 101 100   CO2 mmol/L 26 29 24  --  23 28 27   BUN mg/dL 82* 67* 100*  --  136* 108* 117*   CREATININE mg/dL 6 46* 5 41* 6 85*  --  6 68* 5 14* 4 92*   CALCIUM mg/dL 8 0* 8 1* 8 1*  --  7 6* 7 7* 8 0*   MAGNESIUM mg/dL  --   --  2 7*  --   --   --   --    PHOSPHORUS mg/dL  --   --  7 1*  --   --   --   --

## 2019-12-30 NOTE — PROGRESS NOTES
Progress Note - Infectious Disease   Oliver Sharpe 70 y o  male MRN: [id number]  Unit/Bed#: [facility] 406-01 Encounter: [id number]      Impression/Recommendations:  1   Leukocytosis   Likely multifactorial due to #2, dialysis, anemia   UA, LFTs, lipase, blood cultures, CT A/P negative   CT shows retrosternal collection which is likely postsurgical as no evidence of incisional cellulitis/drainage or sternal instability   Clinically stable without fever  Improving with antibiotics  Rec:  ? Continue cefepime as below  ? Follow temperatures and WBC closely  ? Supportive care as per the primary service     2   Bibasilar pneumonia    Consider due to aspiration versus atelectasis   Sputum with Serratia   Status post left thoracentesis x2 with bland chemistries and negative cultures   Procalcitonin 10 98 > 4 82  Clinically improving with antibiotics  Rec:  ? Continue cefepime for 1 more day  ? Continue OOB, incentive spirometry  ? Follow respiratory status closely     3   Acute type A aortic dissection   Status post jordy arch reconstruction cannula aortic valve suspension using deck run graft 12/15  Rec:  ? Postoperative care per cardiac surgery     4   AFSHIN on CKD   In setting of recent surgery   Now on HD  Rec:  ? Dose adjust antibiotics for HD  ? Renal follow-up ongoing  ? As blood cultures negative, okay from ID for PermCath if clinically indicated     Antibiotics:  Cefepime #6    Subjective:  Patient seen on AM rounds  Getting HD  Feels well  Denies cough, shortness of breath, nausea, vomiting, or diarrhea  Denies fevers, chills, or sweats  24 Hour Events:  No documented fevers, chills, sweats, nausea, vomiting, or diarrhea  WBC trending down  Creatinine trending up and is getting HD today      Objective:  Vitals:  Temp:  [97 6 °F (36 4 °C)-98 2 °F (36 8 °C)] 98 2 °F (36 8 °C)  HR:  [63-90] 66  Resp:  [16-20] 20  BP: ()/(55-67) 120/61  SpO2:  [89 %-96 %] 96 %  Temp (24hrs), Av 9 °F (36 6 °C), Min:97 6 °F (36 4 °C), Max:98 2 °F (36 8 °C)  Current: Temperature: 98 2 °F (36 8 °C)    Physical Exam:   General:  No acute distress  Psychiatric:  Awake and alert  Pulmonary:  Normal respiratory excursion without accessory muscle use  Chest:  Midline sternal wound intact with glue  Abdomen:  Soft, nontender  Extremities:  No edema  Skin:  No rashes    Lab Results:  I have personally reviewed pertinent labs  Results from last 7 days   Lab Units 12/30/19 0441 12/29/19 0435 12/28/19  0455  12/25/19  1014   POTASSIUM mmol/L 4 2 4 4 4 3   < >  --    CHLORIDE mmol/L 101 101 96*   < >  --    CO2 mmol/L 26 29 24   < >  --    BUN mg/dL 82* 67* 100*   < >  --    CREATININE mg/dL 6 46* 5 41* 6 85*   < >  --    EGFR ml/min/1 73sq m 8 9 7   < >  --    CALCIUM mg/dL 8 0* 8 1* 8 1*   < >  --    AST U/L  --  32  --   --  66*   ALT U/L  --  21  --   --  26   ALK PHOS U/L  --  154*  --   --  168*    < > = values in this interval not displayed  Results from last 7 days   Lab Units 12/30/19 0441 12/29/19 0435 12/28/19  0455   WBC Thousand/uL 21 60* 24 57* 26 74*   HEMOGLOBIN g/dL 9 7* 9 7* 10 1*   PLATELETS Thousands/uL 281 264 268     Results from last 7 days   Lab Units 12/27/19  1529 12/27/19  1010 12/26/19  1624 12/25/19  6456   BLOOD CULTURE   --   --  No Growth at 72 hrs   --    SPUTUM CULTURE   --   --   --  3+ Growth of Serratia marcescens*   GRAM STAIN RESULT  1+ Polys  No organisms seen 2+ Polys  No bacteria seen  --  No polys seen*  4+ Gram negative rods*   BODY FLUID CULTURE, STERILE  No growth No growth  --   --        Imaging Studies:   I have personally reviewed pertinent imaging study reports and images in PACS  EKG, Pathology, and Other Studies:   I have personally reviewed pertinent reports

## 2019-12-31 PROBLEM — D69.6 THROMBOCYTOPENIA (HCC): Status: RESOLVED | Noted: 2019-12-19 | Resolved: 2019-12-31

## 2019-12-31 PROBLEM — S22.23XD: Status: ACTIVE | Noted: 2019-12-31

## 2019-12-31 LAB
ALBUMIN SERPL ELPH-MCNC: 2.43 G/DL (ref 3.5–5)
ALBUMIN SERPL ELPH-MCNC: 44.1 % (ref 52–65)
ALPHA1 GLOB SERPL ELPH-MCNC: 0.47 G/DL (ref 0.1–0.4)
ALPHA1 GLOB SERPL ELPH-MCNC: 8.5 % (ref 2.5–5)
ALPHA2 GLOB SERPL ELPH-MCNC: 0.56 G/DL (ref 0.4–1.2)
ALPHA2 GLOB SERPL ELPH-MCNC: 10.2 % (ref 7–13)
ANION GAP SERPL CALCULATED.3IONS-SCNC: 5 MMOL/L (ref 4–13)
BACTERIA BLD CULT: NORMAL
BETA GLOB ABNORMAL SERPL ELPH-MCNC: 0.17 G/DL (ref 0.4–0.8)
BETA1 GLOB SERPL ELPH-MCNC: 3.1 % (ref 5–13)
BETA2 GLOB SERPL ELPH-MCNC: 3.5 % (ref 2–8)
BETA2+GAMMA GLOB SERPL ELPH-MCNC: 0.19 G/DL (ref 0.2–0.5)
BUN SERPL-MCNC: 59 MG/DL (ref 5–25)
C3 SERPL-MCNC: 49.9 MG/DL (ref 90–180)
C4 SERPL-MCNC: 14 MG/DL (ref 10–40)
CALCIUM SERPL-MCNC: 7.7 MG/DL (ref 8.3–10.1)
CHLORIDE SERPL-SCNC: 104 MMOL/L (ref 100–108)
CO2 SERPL-SCNC: 29 MMOL/L (ref 21–32)
CREAT SERPL-MCNC: 5.06 MG/DL (ref 0.6–1.3)
ERYTHROCYTE [DISTWIDTH] IN BLOOD BY AUTOMATED COUNT: 15.2 % (ref 11.6–15.1)
GAMMA GLOB ABNORMAL SERPL ELPH-MCNC: 1.68 G/DL (ref 0.5–1.6)
GAMMA GLOB SERPL ELPH-MCNC: 30.6 % (ref 12–22)
GFR SERPL CREATININE-BSD FRML MDRD: 10 ML/MIN/1.73SQ M
GLUCOSE SERPL-MCNC: 110 MG/DL (ref 65–140)
GLUCOSE SERPL-MCNC: 133 MG/DL (ref 65–140)
GLUCOSE SERPL-MCNC: 136 MG/DL (ref 65–140)
GLUCOSE SERPL-MCNC: 143 MG/DL (ref 65–140)
GLUCOSE SERPL-MCNC: 99 MG/DL (ref 65–140)
HCT VFR BLD AUTO: 28.5 % (ref 36.5–49.3)
HGB BLD-MCNC: 9.1 G/DL (ref 12–17)
IGG/ALB SER: 0.79 {RATIO} (ref 1.1–1.8)
INR PPP: 1.9 (ref 0.84–1.19)
INTERPRETATION UR IFE-IMP: NORMAL
M PROTEIN 1 MFR SERPL ELPH: 3.2 %
M PROTEIN 1 SERPL ELPH-MCNC: 0.18 G/DL
MCH RBC QN AUTO: 30.6 PG (ref 26.8–34.3)
MCHC RBC AUTO-ENTMCNC: 31.9 G/DL (ref 31.4–37.4)
MCV RBC AUTO: 96 FL (ref 82–98)
PHOSPHATE SERPL-MCNC: 4.8 MG/DL (ref 2.3–4.1)
PLATELET # BLD AUTO: 184 THOUSANDS/UL (ref 149–390)
PMV BLD AUTO: 9.9 FL (ref 8.9–12.7)
POTASSIUM SERPL-SCNC: 4.8 MMOL/L (ref 3.5–5.3)
PROT PATTERN SERPL ELPH-IMP: ABNORMAL
PROT SERPL-MCNC: 5.5 G/DL (ref 6.4–8.2)
PROTHROMBIN TIME: 21.3 SECONDS (ref 11.6–14.5)
RBC # BLD AUTO: 2.97 MILLION/UL (ref 3.88–5.62)
SODIUM SERPL-SCNC: 138 MMOL/L (ref 136–145)
WBC # BLD AUTO: 21.02 THOUSAND/UL (ref 4.31–10.16)

## 2019-12-31 PROCEDURE — 85610 PROTHROMBIN TIME: CPT | Performed by: PHYSICIAN ASSISTANT

## 2019-12-31 PROCEDURE — 94640 AIRWAY INHALATION TREATMENT: CPT

## 2019-12-31 PROCEDURE — 99232 SBSQ HOSP IP/OBS MODERATE 35: CPT | Performed by: INTERNAL MEDICINE

## 2019-12-31 PROCEDURE — 94760 N-INVAS EAR/PLS OXIMETRY 1: CPT

## 2019-12-31 PROCEDURE — 86160 COMPLEMENT ANTIGEN: CPT | Performed by: INTERNAL MEDICINE

## 2019-12-31 PROCEDURE — 99024 POSTOP FOLLOW-UP VISIT: CPT | Performed by: THORACIC SURGERY (CARDIOTHORACIC VASCULAR SURGERY)

## 2019-12-31 PROCEDURE — 97116 GAIT TRAINING THERAPY: CPT

## 2019-12-31 PROCEDURE — 84100 ASSAY OF PHOSPHORUS: CPT | Performed by: INTERNAL MEDICINE

## 2019-12-31 PROCEDURE — 80048 BASIC METABOLIC PNL TOTAL CA: CPT | Performed by: PHYSICIAN ASSISTANT

## 2019-12-31 PROCEDURE — 82948 REAGENT STRIP/BLOOD GLUCOSE: CPT

## 2019-12-31 PROCEDURE — 94668 MNPJ CHEST WALL SBSQ: CPT

## 2019-12-31 PROCEDURE — 85027 COMPLETE CBC AUTOMATED: CPT | Performed by: PHYSICIAN ASSISTANT

## 2019-12-31 RX ORDER — LEVALBUTEROL 1.25 MG/.5ML
1.25 SOLUTION, CONCENTRATE RESPIRATORY (INHALATION)
Status: DISCONTINUED | OUTPATIENT
Start: 2019-12-31 | End: 2020-01-06

## 2019-12-31 RX ORDER — ALBUTEROL SULFATE 2.5 MG/3ML
2.5 SOLUTION RESPIRATORY (INHALATION) EVERY 6 HOURS PRN
Status: DISCONTINUED | OUTPATIENT
Start: 2019-12-31 | End: 2020-01-07 | Stop reason: HOSPADM

## 2019-12-31 RX ORDER — SODIUM CHLORIDE FOR INHALATION 0.9 %
3 VIAL, NEBULIZER (ML) INHALATION
Status: DISCONTINUED | OUTPATIENT
Start: 2019-12-31 | End: 2020-01-06

## 2019-12-31 RX ADMIN — ISODIUM CHLORIDE 3 ML: 0.03 SOLUTION RESPIRATORY (INHALATION) at 19:47

## 2019-12-31 RX ADMIN — SENNOSIDES AND DOCUSATE SODIUM 1 TABLET: 8.6; 5 TABLET ORAL at 09:02

## 2019-12-31 RX ADMIN — ISODIUM CHLORIDE 3 ML: 0.03 SOLUTION RESPIRATORY (INHALATION) at 08:40

## 2019-12-31 RX ADMIN — ONDANSETRON 4 MG: 2 INJECTION INTRAMUSCULAR; INTRAVENOUS at 00:41

## 2019-12-31 RX ADMIN — ASPIRIN 81 MG 81 MG: 81 TABLET ORAL at 09:01

## 2019-12-31 RX ADMIN — METOPROLOL TARTRATE 12.5 MG: 25 TABLET ORAL at 09:01

## 2019-12-31 RX ADMIN — GUAIFENESIN 600 MG: 600 TABLET, EXTENDED RELEASE ORAL at 09:02

## 2019-12-31 RX ADMIN — LEVALBUTEROL HYDROCHLORIDE 1.25 MG: 1.25 SOLUTION, CONCENTRATE RESPIRATORY (INHALATION) at 13:48

## 2019-12-31 RX ADMIN — ONDANSETRON 4 MG: 2 INJECTION INTRAMUSCULAR; INTRAVENOUS at 12:13

## 2019-12-31 RX ADMIN — GUAIFENESIN 600 MG: 600 TABLET, EXTENDED RELEASE ORAL at 22:53

## 2019-12-31 RX ADMIN — ONDANSETRON 4 MG: 2 INJECTION INTRAMUSCULAR; INTRAVENOUS at 06:44

## 2019-12-31 RX ADMIN — LEVALBUTEROL HYDROCHLORIDE 1.25 MG: 1.25 SOLUTION, CONCENTRATE RESPIRATORY (INHALATION) at 19:47

## 2019-12-31 RX ADMIN — POLYETHYLENE GLYCOL 3350 17 G: 17 POWDER, FOR SOLUTION ORAL at 09:01

## 2019-12-31 RX ADMIN — ISODIUM CHLORIDE 3 ML: 0.03 SOLUTION RESPIRATORY (INHALATION) at 13:48

## 2019-12-31 RX ADMIN — PANTOPRAZOLE SODIUM 40 MG: 40 TABLET, DELAYED RELEASE ORAL at 09:02

## 2019-12-31 RX ADMIN — CEFEPIME HYDROCHLORIDE 1000 MG: 1 INJECTION, POWDER, FOR SOLUTION INTRAMUSCULAR; INTRAVENOUS at 13:37

## 2019-12-31 RX ADMIN — SENNOSIDES AND DOCUSATE SODIUM 1 TABLET: 8.6; 5 TABLET ORAL at 17:01

## 2019-12-31 RX ADMIN — METOPROLOL TARTRATE 12.5 MG: 25 TABLET ORAL at 22:53

## 2019-12-31 RX ADMIN — MIDODRINE HYDROCHLORIDE 10 MG: 5 TABLET ORAL at 11:14

## 2019-12-31 RX ADMIN — ONDANSETRON 4 MG: 2 INJECTION INTRAMUSCULAR; INTRAVENOUS at 18:04

## 2019-12-31 RX ADMIN — LEVALBUTEROL HYDROCHLORIDE 1.25 MG: 1.25 SOLUTION, CONCENTRATE RESPIRATORY (INHALATION) at 08:40

## 2019-12-31 RX ADMIN — MIDODRINE HYDROCHLORIDE 10 MG: 5 TABLET ORAL at 06:44

## 2019-12-31 RX ADMIN — MIDODRINE HYDROCHLORIDE 10 MG: 5 TABLET ORAL at 16:15

## 2019-12-31 NOTE — PHYSICAL THERAPY NOTE
Physical Therapy Progress Note     12/31/19 0835   Pain Assessment   Pain Assessment No/denies pain   Pain Score No Pain   Restrictions/Precautions   Other Precautions Cardiac/sternal;O2   Subjective   Subjective The pt  states that he is feeling better today  Transfers   Sit to Stand 5  Supervision   Stand to Sit 5  Supervision   Ambulation/Elevation   Gait pattern Excessively slow; Short stride; Forward Flexion   Gait Assistance 5  Supervision   Additional items Verbal cues   Assistive Device Rolling walker   Distance 360 feet  Balance   Static Sitting Good   Dynamic Sitting Fair   Static Standing Fair -   Ambulatory Fair -   Activity Tolerance   Activity Tolerance Patient tolerated treatment well   Nurse Made Aware Rohit Gay RN  Assessment   Prognosis Good   Problem List Decreased strength;Decreased endurance; Impaired balance;Decreased mobility   Assessment The pt  has improving mobility as he was able to ambulate beyond community distance without physical assistance  He does have a slow shandra which increases his ambulatory time, but he was able to readily manuever around multiple obstacles  Will continue to follow in order to maximize his functional mobility, safety, and independence  Goals   Patient Goals To get back home with his family  STG Expiration Date 01/10/20   PT Treatment Day 5   Plan   Treatment/Interventions Functional transfer training;LE strengthening/ROM; Therapeutic exercise; Endurance training;Patient/family training;Bed mobility;Gait training   Progress Progressing toward goals   PT Frequency   (4-6x a week )   Recommendation   Recommendation Home PT; Home with family support   Equipment Recommended Tera Ambriz PTA

## 2019-12-31 NOTE — PROGRESS NOTES
Progress Note - Infectious Disease   Oliver Sharpe 70 y o  male MRN: [id number]  Unit/Bed#: [facility] 406-01 Encounter: [id number]      Impression/Recommendations:  1   Leukocytosis   Likely multifactorial due to #2, dialysis, anemia   UA, LFTs, lipase, blood cultures, CT A/P negative   CT shows retrosternal collection which is likely postsurgical as no evidence of incisional cellulitis/drainage or sternal instability   Clinically stable without fever   Improving with antibiotics  Residual leukocytosis may now be leukomoid to anemia, recent surgery, HD  Rec:  ? Discontinue cefepime as below to complete treatment course  ? Follow temperatures and WBC closely  ? Supportive care as per the primary service     2   Bibasilar pneumonia    Consider due to aspiration versus atelectasis   Sputum with Serratia   Status post left thoracentesis x2 with bland chemistries and negative cultures   Procalcitonin 10 98 > 4 82  Clinically improved status post 7 days IV antibiotics  Rec:  ? Discontinue cefepime today to complete treatment course  ? Continue OOB, incentive spirometry  ? Follow respiratory status closely     3   Acute type A aortic dissection   Status post jordy arch reconstruction cannula aortic valve suspension using deck run graft 12/15  Rec:  ? Postoperative care per cardiac surgery     4   AFSHIN on CKD   In setting of recent surgery   Now on HD  Rec:  ? Renal follow-up ongoing  ? As blood cultures negative, okay from ID for PermCath if clinically indicated     The patient is stable from an ID standpoint  I will reassess the patient on Thursday 1/2  Please call in the interim with new questions  Antibiotics:  Cefepime #7    Subjective:  Patient seen on PM rounds  Feels great  Was ambulating in the halls earlier  Denies pain  Denies cough or shortness of breath  Denies fevers, chills, sweats, nausea, vomiting, or diarrhea  24 Hour Events:  No documented fevers, chills, sweats, nausea, vomiting, or diarrhea  WBC continues to slowly trend downward  Objective:  Vitals:  Temp:  [97 3 °F (36 3 °C)-98 5 °F (36 9 °C)] 98 2 °F (36 8 °C)  HR:  [69-88] 80  Resp:  [18-20] 20  BP: ()/(51-72) 102/61  SpO2:  [94 %-97 %] 95 %  Temp (24hrs), Av 9 °F (36 6 °C), Min:97 3 °F (36 3 °C), Max:98 5 °F (36 9 °C)  Current: Temperature: 98 2 °F (36 8 °C)    Physical Exam:   General:  No acute distress  Psychiatric:  Awake and alert  Chest:  Sternal incision intact with glue  Pulmonary:  Normal respiratory excursion without accessory muscle use  Abdomen:  Soft, nontender  Extremities:  No edema  Skin:  No rashes    Lab Results:  I have personally reviewed pertinent labs  Results from last 7 days   Lab Units 19  0441 19  0435  19  1014   POTASSIUM mmol/L 4 8 4 2 4 4   < >  --    CHLORIDE mmol/L 104 101 101   < >  --    CO2 mmol/L 29 26 29   < >  --    BUN mg/dL 59* 82* 67*   < >  --    CREATININE mg/dL 5 06* 6 46* 5 41*   < >  --    EGFR ml/min/1 73sq m 10 8 9   < >  --    CALCIUM mg/dL 7 7* 8 0* 8 1*   < >  --    AST U/L  --   --  32  --  66*   ALT U/L  --   --  21  --  26   ALK PHOS U/L  --   --  154*  --  168*    < > = values in this interval not displayed  Results from last 7 days   Lab Units 19  0441 19  0435   WBC Thousand/uL 21 02* 21 60* 24 57*   HEMOGLOBIN g/dL 9 1* 9 7* 9 7*   PLATELETS Thousands/uL 184 281 264     Results from last 7 days   Lab Units 19  1529 19  1010 19  1624 19  9684   BLOOD CULTURE   --   --  No Growth After 4 Days  --    SPUTUM CULTURE   --   --   --  3+ Growth of Serratia marcescens*   GRAM STAIN RESULT  1+ Polys  No organisms seen 2+ Polys  No bacteria seen  --  No polys seen*  4+ Gram negative rods*   BODY FLUID CULTURE, STERILE  No growth No growth  --   --        Imaging Studies:   I have personally reviewed pertinent imaging study reports and images in PACS      EKG, Pathology, and Other Studies:   I have personally reviewed pertinent reports

## 2019-12-31 NOTE — PROGRESS NOTES
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, Ascending aortic aneurysm     S/P Replacement of ascending aorta with aggressive hemiarch reconstruction and aortic valve resuspension with a 26 mm Dacron graft; POD # 16      24 Hour Events: Had HD yesterday  Pt feels well today  Denies CP or SOB  Wants to go home      Medications:   Scheduled Meds:    Current Facility-Administered Medications:  acetaminophen 650 mg Oral Q4H PRN Makenzie Bernal PA-C    aspirin 81 mg Oral Daily Sumaya Perry PA-C    bisacodyl 10 mg Rectal Daily PRN Leann Linder PA-C    cefepime 1,000 mg Intravenous Q24H Marleen Rutledge PA-C Last Rate: Stopped (12/30/19 1413)   guaiFENesin 600 mg Oral Q12H [address] Sumaya Perry PA-C    insulin lispro 1-5 Units Subcutaneous TID AC Alex Linder PA-C    insulin lispro 1-5 Units Subcutaneous HS Alex Linder PA-C    iohexol 50 mL Oral 90 min pre-procedure Alyce Calderon PA-C    levalbuterol 1 25 mg Nebulization TID Cornel Tobias DO    menthol-methyl salicylate  Apply externally 4x Daily PRN Ann-Marie Hdez PA-C    metoclopramide 5 mg Intravenous Q8H PRN Marleen Rutledge PA-C    metoprolol tartrate 12 5 mg Oral Q12H [address] Guillermo Beckman MD    midodrine 10 mg Oral TID AC Omar Cuba MD    ondansetron 4 mg Intravenous Q6H Edda Riddle PA-C    oxyCODONE-acetaminophen 1 tablet Oral Q4H PRN Makenzie Bernal PA-C    oxyCODONE-acetaminophen 2 tablet Oral Q6H PRN Leann Linder PA-C    pantoprazole 40 mg Oral Daily Alex Linder PA-C    polyethylene glycol 17 g Oral Daily Alex Sullivan PA-C    senna-docusate sodium 1 tablet Oral BID Marleen Weaver PA-C    sodium chloride 3 mL Nebulization TID Cornel Tobias DO    temazepam 15 mg Oral HS PRN Cheral Lady Kailash PA-C      Continuous Infusions:   PRN Meds:   acetaminophen    bisacodyl    menthol-methyl salicylate    metoclopramide    oxyCODONE-acetaminophen    oxyCODONE-acetaminophen    temazepam    Vitals:   Vitals:    12/30/19 2329 12/31/19 0315 12/31/19 0631 12/31/19 0700   BP: 109/59 104/55  116/72   BP Location: Left arm Left arm  Left arm   Pulse: 72 69  75   Resp: 18 18  20   Temp: 98 °F (36 7 °C) (!) 97 3 °F (36 3 °C)  97 6 °F (36 4 °C)   TempSrc: Oral Oral  Oral   SpO2: 96% 94%  96%   Weight:   80 3 kg (177 lb 0 5 oz)    Height:           Telemetry: NSR  Heart Rate: 72    Respiratory:   SpO2: SpO2: 96 %; 3 LPM    Intake/Output: -130 ml / 24 hrs  I/O       12/28 0701 - 12/29 0700 12/29 0701 - 12/30 0700 12/30 0701 - 12/31 0700    P  O  720 1050     I V  (mL/kg) 500 (6 3)      Total Intake(mL/kg) 1220 (15 3) 1050 (13)     Urine (mL/kg/hr) 100 (0 1) 450 (0 2)     Emesis/NG output 0      Other 301      Stool 0      Total Output 401 450     Net +819 +600            Unmeasured Urine Occurrence 2 x      Unmeasured Stool Occurrence 3 x      Unmeasured Emesis Occurrence 1 x                Weights:   Weight (last 2 days)     Date/Time   Weight    12/31/19 0631   80 3 (177 03)    12/30/19 0633   80 5 (177 47)    12/29/19 0640   80 (176 37)            Admit weight: 83 5 klg    Results:   Results from last 7 days   Lab Units 12/30/19 0441 12/29/19  0435 12/28/19  0455   WBC Thousand/uL 21 60* 24 57* 26 74*   HEMOGLOBIN g/dL 9 7* 9 7* 10 1*   HEMATOCRIT % 30 3* 30 2* 31 2*   PLATELETS Thousands/uL 281 264 268     Results from last 7 days   Lab Units 12/31/19 0457 12/30/19  0441 12/29/19  0435   SODIUM mmol/L 138 137 136   POTASSIUM mmol/L 4 8 4 2 4 4   CHLORIDE mmol/L 104 101 101   CO2 mmol/L 29 26 29   BUN mg/dL 59* 82* 67*   CREATININE mg/dL 5 06* 6 46* 5 41*   CALCIUM mg/dL 7 7* 8 0* 8 1*     Results from last 7 days   Lab Units 12/31/19  0457 12/30/19  0441 12/29/19  0435   INR  1 90* 1 82* 1 87*       Coumadin mg 1 1 1 0 0 0     Procalcitonin 12/30: 4 82 (10 98)    Point of care glucose:  - 180   2 units SSIC / 24 hrs required    Studies:  No studies past 24 hrs      Invasive Lines/Tubes:  Invasive Devices     Peripheral Intravenous Line            Peripheral IV 12/29/19 Left Arm 1 day          Hemodialysis Catheter            HD Temporary Double Catheter 7 days                Physical Exam:    HEENT/NECK:  PERRLA  No jugular venous distention  Cardiac: Regular rate and rhythm and No murmurs/rubs/gallops  Pulmonary:  Breath sounds clear bilaterally and No rales/rhonchi/wheezes  Abdomen:  Non-tender, Non-distended and Normal bowel sounds  Incisions: Sternal instability with coughing, clicking noted  Incision C/D/I  Axillary cannulation site C/D/I  Extremities: Extremities warm/dry, Radial pulses 2+ bilaterally and No edema B/L  Neuro: Alert and oriented X 3, Sensation is grossly intact and No focal deficits  Skin: Warm/Dry, without rashes or lesions  Assessment:  Principal Problem:    Dissection of thoracic aorta (HCC)  Active Problems:    Benign essential hypertension    Abnormal TSH    Benign hypertension with CKD (chronic kidney disease) stage III (HCC)    Benign prostatic hyperplasia without lower urinary tract symptoms    S/P aorta repair    Leukocytosis    Thrombocytopenia (HCC)    Postoperative anemia due to acute blood loss    Anticoagulation goal of INR 2 to 3    AFSHIN (acute kidney injury) ([facility] [address] )    Hyperphosphatemia    Hypotension       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, Ascending aortic aneurysm     S/P Replacement of ascending aorta with aggressive hemiarch reconstruction and aortic valve resuspension with a 26 mm Dacron graft; POD # 16    Plan:    1  Cardiac:   NSR; HR/BP well-controlled  Lopressor, 12 5mg PO BID   Midodrine 10mg TID  Continue ASA and Statin therapy  Epicardial pacing wires out  Patient no longer has central IV access   Continue DVT prophylaxis  PAF - Hold Coumadin tonight in anticipation of Permacath placement    Sternal dissociation - pt asymptomatic  Discussed with Dr Padilla Seen - Continue to monitor  2  Pulmonary:   Acute post-op pulmonary insufficiency; Requiring 3 liters via nasal cannla, secondary to atelectasis and pneumonia  Continue incentive spirometry/coughing/deep breathing exercises  Wean supplemental oxygen as tolerated for saturation > 90%  Chest tubes have been discontinued    3  Renal:   Intake/Output net: +100 mL/24 hours  AFSHIN on CKD 3 - Post op Creatinine increased, Renal following  HD yesterday  Will discuss need for Permacath placement with Nephrology and ID     4  Neuro:  Neurologically intact; No active issues  Incisional pain well-controlled; Continue prn Percocet    5  GI:  Tolerating TLC 2 3 gm sodium diet  Maintain 1800 mL daily fluid restriction   Continue stool softeners and prn suppository  Continue GI prophylaxis    6  Endo:   No history of diabetes; Glucose well-controlled with sliding scale coverage    7    Hematology:     Post-operative acute blood loss anemia; Hemoglobin 9 1; trend prn  Leukocytosis -WBC stable, afebrile  ID following  ID: WBC downtrending, afebrile, possible aspiration pneumonia vs atelectasis, sputum positive for Serratia, ID following  continue cefepime x 1 more days  Procalcitonin downtrending  S/P thoracentesis x 2 - fluid negative and blood culture negative to date    8  Disposition:      Patient and family refusing rehab at discharge  Wish to discharge patient to home  VTE Pharmacologic Prophylaxis: Warfarin (Coumadin)  VTE Mechanical Prophylaxis: sequential compression device    Collaborative rounds completed with CHIDI Nance    Plan of care discussed with bedside nurse    SIGNATURE: Janna Leiva PA-C  DATE: December 31, 2019  TIME: 7:39 AM

## 2019-12-31 NOTE — QUICK NOTE
Chart reviewed  Patient requiring permacath for long term dialysis  Pt presented to hospital with aortic dissection with subsequent AFSHIN  Temp HD catheter was placed by IR 12/23  He continues to require HD  Currently WBC is 21  ID has signed off on patient for permacath placement due to blood cultures being negative  No documented temps in last 48 hours  INR was 1 9 this AM  We will recheck INR Thursday morning  Please keep patient NPO after midnight Wednesday in preparation for hopeful permacath Thursday pending INR       Luis E Underwood PA-C

## 2019-12-31 NOTE — PROGRESS NOTES
Progress Note - Nephrology   [facility] A Core 70 y o  male MRN: [id number]  Unit/Bed#: [facility] 406-01 Encounter: [id number]      Assessment / Plan:  1  Acute kidney injury likely due to postop ATN as well as contrast nephropathy with CT on admission, HD dependent since 19  Temp HD line in place  Recommend permacath when able   -last HD yesterday, plan for next HD 19    -renal ultrasound shows right kidney atrophy with small left renal cysts  -I note low C3 with normal C4  Has had proteinuria in the past  Isolated low C3 can be seen in the setting of PIGN  Will defer renal biopsy at this time in light of atrophic kidney and some recovery seen in renal function since admission  -UA with microscopy shows trace leukocytes, 2-4 RBCs, 10-20 WBCs with 5-10 hyaline casts  -chronic hepatitis panel negative, urine protein electrophoresis, anti-GBM, Anca, AMY panel is pending  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, SPEP + for monoclonality  -f/u am BMP  -monitor UOP  2  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 - b/l sCr 1 4-1 8, follows outpatient with Dr Mariam Jenkins  3  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  4  Anemia - Hgb 9 1, monitor CBC  5  Hyperphosphatemia - last phos improved to 4 8, monitor  6  Hypotension - improved on midodrine, also on metoprolol with hold parameters  7  PNA - s/p cefepime  8  Left pleural effusion s/p thoracentesis ,         Subjective:   He denies any chest pain or shortness of breath  He is urinating "okay but not a lot "      Objective:     Vitals: Blood pressure 102/61, pulse 80, temperature 98 2 °F (36 8 °C), temperature source Oral, resp  rate 20, height 5' 9" (1 753 m), weight 80 3 kg (177 lb 0 5 oz), SpO2 94 %  ,Body mass index is 26 14 kg/m²  Temp (24hrs), Av 9 °F (36 6 °C), Min:97 3 °F (36 3 °C), Max:98 5 °F (36 9 °C)      Weight (last 2 days)     Date/Time   Weight    19 0631   80 3 (177 03) 12/30/19 0633   80 5 (177 47)    12/29/19 0640   80 (176 37)                Intake/Output Summary (Last 24 hours) at 12/31/2019 1433  Last data filed at 12/31/2019 1417  Gross per 24 hour   Intake 1120 ml   Output 400 ml   Net 720 ml     I/O last 24 hours: In: 1240 [P O :1320; I V :500]  Out: 1700 [Urine:700; Other:1000]        Physical Exam:   Physical Exam   Constitutional: He appears well-developed and well-nourished  No distress  Thin, +temporal wasting   HENT:   Head: Normocephalic and atraumatic  Mouth/Throat: No oropharyngeal exudate  Eyes: Right eye exhibits no discharge  Left eye exhibits no discharge  No scleral icterus  Neck: Neck supple  No thyromegaly present  Cardiovascular: Normal rate, regular rhythm and normal heart sounds  Pulmonary/Chest: Effort normal and breath sounds normal  He has no wheezes  He has no rales  Abdominal: Soft  Bowel sounds are normal  He exhibits no distension  There is no tenderness  Musculoskeletal: Normal range of motion  He exhibits no edema  Neurological: He is alert  awake   Skin: Skin is warm and dry  No rash noted  He is not diaphoretic  Psychiatric: He has a normal mood and affect  His behavior is normal    Vitals reviewed        Invasive Devices     Peripheral Intravenous Line            Peripheral IV 12/29/19 Left Arm 2 days          Hemodialysis Catheter            HD Temporary Double Catheter 7 days                Medications:    Scheduled Meds:  Current Facility-Administered Medications:  acetaminophen 650 mg Oral Q4H PRN Marty Menchaca PA-C    aspirin 81 mg Oral Daily Sumaya Perry PA-C    bisacodyl 10 mg Rectal Daily PRN Lola Linder PA-C    cefepime 1,000 mg Intravenous Q24H Gabo Lopez PA-C Last Rate: 1,000 mg (12/31/19 1337)   guaiFENesin 600 mg Oral Q12H [address] Sumaya Perry PA-C    insulin lispro 1-5 Units Subcutaneous TID AC Alex Linder PA-C    insulin lispro 1-5 Units Subcutaneous HS Lola Linder PA-C    iohexol 50 mL Oral 90 min pre-procedure Trae Stout PA-C    levalbuterol 1 25 mg Nebulization TID Edvin Linder, DO    menthol-methyl salicylate  Apply externally 4x Daily PRN Ann-Marie Hdez PA-C    metoclopramide 5 mg Intravenous Q8H PRN Petra Lopez PA-C    metoprolol tartrate 12 5 mg Oral Q12H [facility] Chaparro Porter MD    midodrine 10 mg Oral TID AC Lyndon Fox MD    ondansetron 4 mg Intravenous Q6H Zeynep Lucia PA-C    oxyCODONE-acetaminophen 1 tablet Oral Q4H PRN Netta Funk PA-C    oxyCODONE-acetaminophen 2 tablet Oral Q6H PRN Brenda Linder PA-C    pantoprazole 40 mg Oral Daily Alex Linder PA-C    polyethylene glycol 17 g Oral Daily Alex Sullivan PA-C    senna-docusate sodium 1 tablet Oral BID Petra Weaver PA-C    sodium chloride 3 mL Nebulization TID Edvin Linder,     temazepam 15 mg Oral HS PRN Netta Funk PA-C        PRN Meds:   acetaminophen    bisacodyl    menthol-methyl salicylate    metoclopramide    oxyCODONE-acetaminophen    oxyCODONE-acetaminophen    temazepam    Continuous Infusions:         LAB RESULTS:      Results from last 7 days   Lab Units 12/31/19  0457 12/30/19  0441 12/29/19  0435 12/28/19  0455 12/27/19  0516 12/26/19  0522 12/25/19  1014 12/25/19  0442   WBC Thousand/uL 21 02* 21 60* 24 57* 26 74* 30 98* 26 38*  --  29 73*   HEMOGLOBIN g/dL 9 1* 9 7* 9 7* 10 1* 9 9* 11 7*  --  11 4*   HEMATOCRIT % 28 5* 30 3* 30 2* 31 2* 29 9* 36 9  --  34 9*   PLATELETS Thousands/uL 184 281 264 268 236 288  --  312   NEUTROS PCT %  --   --   --   --   --  86*  --  84*   LYMPHS PCT %  --   --   --   --   --  3*  --  3*   LYMPHO PCT %  --  3* 1*  --  2*  --   --   --    MONOS PCT %  --   --   --   --   --  9  --  11   MONO PCT %  --  9 8  --  4  --   --   --    EOS PCT %  --  2 1  --  0 0  --  0   POTASSIUM mmol/L 4 8 4 2 4 4 4 3  --  4 8  --  4 7   CHLORIDE mmol/L 104 101 101 96*  --  99*  --  101   CO2 mmol/L 29 26 29 24  --  23  --  28   BUN mg/dL 59* 82* 67* 100*  -- 136*  --  108*   CREATININE mg/dL 5 06* 6 46* 5 41* 6 85*  --  6 68*  --  5 14*   CALCIUM mg/dL 7 7* 8 0* 8 1* 8 1*  --  7 6*  --  7 7*   ALK PHOS U/L  --   --  154*  --   --   --  168*  --    ALT U/L  --   --  21  --   --   --  26  --    AST U/L  --   --  32  --   --   --  66*  --    MAGNESIUM mg/dL  --   --   --  2 7*  --   --   --   --    PHOSPHORUS mg/dL 4 8*  --   --  7 1*  --   --   --   --        CUTURES:  Lab Results   Component Value Date    BLOODCX No Growth After 4 Days  12/26/2019    URINECX 10,000-19,000 cfu/ml  03/08/2018                 Portions of the record may have been created with voice recognition software  Occasional wrong word or "sound a like" substitutions may have occurred due to the inherent limitations of voice recognition software  Read the chart carefully and recognize, using context, where substitutions have occurred  If you have any questions, please contact the dictating provider

## 2020-01-01 LAB
ALBUMIN UR ELPH-MCNC: 24.5 %
ALPHA1 GLOB MFR UR ELPH: 10 %
ALPHA2 GLOB MFR UR ELPH: 17.1 %
ANION GAP SERPL CALCULATED.3IONS-SCNC: 7 MMOL/L (ref 4–13)
B-GLOBULIN MFR UR ELPH: 30.2 %
BUN SERPL-MCNC: 68 MG/DL (ref 5–25)
CALCIUM SERPL-MCNC: 8 MG/DL (ref 8.3–10.1)
CHLORIDE SERPL-SCNC: 104 MMOL/L (ref 100–108)
CO2 SERPL-SCNC: 27 MMOL/L (ref 21–32)
CREAT SERPL-MCNC: 5.91 MG/DL (ref 0.6–1.3)
ERYTHROCYTE [DISTWIDTH] IN BLOOD BY AUTOMATED COUNT: 15.6 % (ref 11.6–15.1)
GAMMA GLOB MFR UR ELPH: 18.2 %
GFR SERPL CREATININE-BSD FRML MDRD: 8 ML/MIN/1.73SQ M
GLUCOSE SERPL-MCNC: 100 MG/DL (ref 65–140)
GLUCOSE SERPL-MCNC: 109 MG/DL (ref 65–140)
GLUCOSE SERPL-MCNC: 112 MG/DL (ref 65–140)
GLUCOSE SERPL-MCNC: 132 MG/DL (ref 65–140)
GLUCOSE SERPL-MCNC: 139 MG/DL (ref 65–140)
HCT VFR BLD AUTO: 28.5 % (ref 36.5–49.3)
HGB BLD-MCNC: 9 G/DL (ref 12–17)
INR PPP: 1.9 (ref 0.84–1.19)
MCH RBC QN AUTO: 30.7 PG (ref 26.8–34.3)
MCHC RBC AUTO-ENTMCNC: 31.6 G/DL (ref 31.4–37.4)
MCV RBC AUTO: 97 FL (ref 82–98)
PLATELET # BLD AUTO: 216 THOUSANDS/UL (ref 149–390)
PMV BLD AUTO: 9.5 FL (ref 8.9–12.7)
POTASSIUM SERPL-SCNC: 4.4 MMOL/L (ref 3.5–5.3)
PROT PATTERN UR ELPH-IMP: ABNORMAL
PROT UR-MCNC: 108 MG/DL
PROTHROMBIN TIME: 21.3 SECONDS (ref 11.6–14.5)
RBC # BLD AUTO: 2.93 MILLION/UL (ref 3.88–5.62)
SODIUM SERPL-SCNC: 138 MMOL/L (ref 136–145)
WBC # BLD AUTO: 19.09 THOUSAND/UL (ref 4.31–10.16)

## 2020-01-01 PROCEDURE — 80048 BASIC METABOLIC PNL TOTAL CA: CPT | Performed by: PHYSICIAN ASSISTANT

## 2020-01-01 PROCEDURE — 94640 AIRWAY INHALATION TREATMENT: CPT

## 2020-01-01 PROCEDURE — 99024 POSTOP FOLLOW-UP VISIT: CPT | Performed by: PHYSICIAN ASSISTANT

## 2020-01-01 PROCEDURE — 97116 GAIT TRAINING THERAPY: CPT

## 2020-01-01 PROCEDURE — 99232 SBSQ HOSP IP/OBS MODERATE 35: CPT | Performed by: INTERNAL MEDICINE

## 2020-01-01 PROCEDURE — 82948 REAGENT STRIP/BLOOD GLUCOSE: CPT

## 2020-01-01 PROCEDURE — 85610 PROTHROMBIN TIME: CPT | Performed by: PHYSICIAN ASSISTANT

## 2020-01-01 PROCEDURE — 94760 N-INVAS EAR/PLS OXIMETRY 1: CPT

## 2020-01-01 PROCEDURE — 85027 COMPLETE CBC AUTOMATED: CPT | Performed by: PHYSICIAN ASSISTANT

## 2020-01-01 RX ADMIN — LEVALBUTEROL HYDROCHLORIDE 1.25 MG: 1.25 SOLUTION, CONCENTRATE RESPIRATORY (INHALATION) at 07:52

## 2020-01-01 RX ADMIN — LEVALBUTEROL HYDROCHLORIDE 1.25 MG: 1.25 SOLUTION, CONCENTRATE RESPIRATORY (INHALATION) at 19:28

## 2020-01-01 RX ADMIN — OXYCODONE HYDROCHLORIDE AND ACETAMINOPHEN 1 TABLET: 5; 325 TABLET ORAL at 00:52

## 2020-01-01 RX ADMIN — TEMAZEPAM 15 MG: 15 CAPSULE ORAL at 00:53

## 2020-01-01 RX ADMIN — ONDANSETRON 4 MG: 2 INJECTION INTRAMUSCULAR; INTRAVENOUS at 06:15

## 2020-01-01 RX ADMIN — TEMAZEPAM 15 MG: 15 CAPSULE ORAL at 22:15

## 2020-01-01 RX ADMIN — METOPROLOL TARTRATE 12.5 MG: 25 TABLET ORAL at 22:15

## 2020-01-01 RX ADMIN — MIDODRINE HYDROCHLORIDE 10 MG: 5 TABLET ORAL at 11:38

## 2020-01-01 RX ADMIN — POLYETHYLENE GLYCOL 3350 17 G: 17 POWDER, FOR SOLUTION ORAL at 08:07

## 2020-01-01 RX ADMIN — PANTOPRAZOLE SODIUM 40 MG: 40 TABLET, DELAYED RELEASE ORAL at 08:07

## 2020-01-01 RX ADMIN — GUAIFENESIN 600 MG: 600 TABLET, EXTENDED RELEASE ORAL at 22:14

## 2020-01-01 RX ADMIN — METOPROLOL TARTRATE 12.5 MG: 25 TABLET ORAL at 08:07

## 2020-01-01 RX ADMIN — MIDODRINE HYDROCHLORIDE 10 MG: 5 TABLET ORAL at 16:08

## 2020-01-01 RX ADMIN — MIDODRINE HYDROCHLORIDE 10 MG: 5 TABLET ORAL at 06:07

## 2020-01-01 RX ADMIN — ISODIUM CHLORIDE 3 ML: 0.03 SOLUTION RESPIRATORY (INHALATION) at 07:52

## 2020-01-01 RX ADMIN — SENNOSIDES AND DOCUSATE SODIUM 1 TABLET: 8.6; 5 TABLET ORAL at 17:28

## 2020-01-01 RX ADMIN — GUAIFENESIN 600 MG: 600 TABLET, EXTENDED RELEASE ORAL at 08:07

## 2020-01-01 RX ADMIN — ONDANSETRON 4 MG: 2 INJECTION INTRAMUSCULAR; INTRAVENOUS at 17:30

## 2020-01-01 RX ADMIN — ASPIRIN 81 MG 81 MG: 81 TABLET ORAL at 08:07

## 2020-01-01 RX ADMIN — ONDANSETRON 4 MG: 2 INJECTION INTRAMUSCULAR; INTRAVENOUS at 11:38

## 2020-01-01 RX ADMIN — ISODIUM CHLORIDE 3 ML: 0.03 SOLUTION RESPIRATORY (INHALATION) at 19:28

## 2020-01-01 RX ADMIN — SENNOSIDES AND DOCUSATE SODIUM 1 TABLET: 8.6; 5 TABLET ORAL at 08:07

## 2020-01-01 NOTE — PROGRESS NOTES
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, Ascending aortic aneurysm     S/P Replacement of ascending aorta with aggressive hemiarch reconstruction and aortic valve resuspension with a 26 mm Dacron graft; POD # 17      24 Hour Events: No events overnight  Pt feels well and offers no complaints  Cefepime course completed yesterday      Medications:   Scheduled Meds:    Current Facility-Administered Medications:  acetaminophen 650 mg Oral Q4H PRN Chuy Dozier PA-C   albuterol 2 5 mg Nebulization Q6H PRN Jeanine Hills DO   aspirin 81 mg Oral Daily Sumaya Perry PA-C   bisacodyl 10 mg Rectal Daily PRN Chuy Dozier PA-C   guaiFENesin 600 mg Oral Q12H [facility] Sumaya Perry PA-C   insulin lispro 1-5 Units Subcutaneous TID [facility] Alex M TRINY Linder   insulin lispro 1-5 Units Subcutaneous HS Alex M TRINY Linder   iohexol 50 mL Oral 90 min pre-procedure Jn Hernández PA-C   levalbuterol 1 25 mg Nebulization BID Jeanine Hills DO   menthol-methyl salicylate  Apply externally 4x Daily PRN Ann-Marie Hdez PA-C   metoclopramide 5 mg Intravenous Q8H PRN Ca Albert PA-C   metoprolol tartrate 12 5 mg Oral Q12H [facility] Melissa Donovan MD   midodrine 10 mg Oral TID LINSEY Cheng MD   ondansetron 4 mg Intravenous Q6H Jovanny Liang PA-C   oxyCODONE-acetaminophen 1 tablet Oral Q4H PRN Chuy Dozier PA-C   oxyCODONE-acetaminophen 2 tablet Oral Q6H PRN Bro Linder PA-C   pantoprazole 40 mg Oral Daily Alex NEETU Linder PA-C   polyethylene glycol 17 g Oral Daily Alexmaurice Sullivan PA-C   senna-docusate sodium 1 tablet Oral BID [facility] Mayi Weaver PA-C   sodium chloride 3 mL Nebulization BID Jeanine Hills, DO   temazepam 15 mg Oral HS PRN Otila Brook Linder PA-C     Continuous Infusions:   PRN Meds:   acetaminophen    albuterol    bisacodyl    menthol-methyl salicylate   metoclopramide    oxyCODONE-acetaminophen    oxyCODONE-acetaminophen    temazepam    Vitals:   Vitals:    12/31/19 2200 12/31/19 2253 01/01/20 0329 01/01/20 0600   BP: 112/58 121/79 105/63    BP Location:   Right arm    Pulse: 72 75 65    Resp: 18  18    Temp: 98 °F (36 7 °C)  98 °F (36 7 °C)    TempSrc: Oral  Oral    SpO2: 93%  95%    Weight:    81 5 kg (179 lb 9 6 oz)   Height:           Telemetry: NSR  Heart Rate: 81    Respiratory:   SpO2: SpO2: 95 %; 2 LPM    Intake/Output: -125 ml / 24 hrs  I/O       12/28 0701 - 12/29 0700 12/29 0701 - 12/30 0700 12/30 0701 - 12/31 0700    P  O  720 1050     I V  (mL/kg) 500 (6 3)      Total Intake(mL/kg) 1220 (15 3) 1050 (13)     Urine (mL/kg/hr) 100 (0 1) 450 (0 2)     Emesis/NG output 0      Other 301      Stool 0      Total Output 401 450     Net +819 +600            Unmeasured Urine Occurrence 2 x      Unmeasured Stool Occurrence 3 x      Unmeasured Emesis Occurrence 1 x                Weights:   Weight (last 2 days)     Date/Time   Weight    01/01/20 0600   81 5 (179 6)    12/31/19 0631   80 3 (177 03)    12/30/19 0633   80 5 (177 47)            Admit weight: 83 5 klg    Results:   Results from last 7 days   Lab Units 01/01/20  0553 12/31/19  0457 12/30/19  0441   WBC Thousand/uL 19 09* 21 02* 21 60*   HEMOGLOBIN g/dL 9 0* 9 1* 9 7*   HEMATOCRIT % 28 5* 28 5* 30 3*   PLATELETS Thousands/uL 216 184 281     Results from last 7 days   Lab Units 01/01/20  0553 12/31/19  0457 12/30/19  0441   SODIUM mmol/L 138 138 137   POTASSIUM mmol/L 4 4 4 8 4 2   CHLORIDE mmol/L 104 104 101   CO2 mmol/L 27 29 26   BUN mg/dL 68* 59* 82*   CREATININE mg/dL 5 91* 5 06* 6 46*   CALCIUM mg/dL 8 0* 7 7* 8 0*     Results from last 7 days   Lab Units 01/01/20  0553 12/31/19  0457 12/30/19  0441   INR  1 90* 1 90* 1 82*       Coumadin mg 0 1 1 1 0 0, 0     Procalcitonin 12/30: 4 82 (10 98)    Point of care glucose: BS 99 - 143   No SSIC / 24 hrs required    Studies:  No studies past 24 hrs      Invasive Lines/Tubes:  Invasive Devices     Peripheral Intravenous Line            Peripheral IV 12/29/19 Left Arm 2 days          Hemodialysis Catheter            HD Temporary Double Catheter 8 days              Physical Exam:    HEENT/NECK:  PERRLA  No jugular venous distention  Cardiac: Regular rate and rhythm and No murmurs/rubs/gallops  Pulmonary:  Breath sounds clear bilaterally and No rales/rhonchi/wheezes  Abdomen:  Non-tender, Non-distended and Normal bowel sounds  Incisions: Sternal incision clean/dry/intact  Sternal instability noted with deep inspiration/cough  Non-tender  Axillary cannulation site C/D/I  Extremities: Extremities warm/dry, Radial pulses 2+ bilaterally and No edema B/L  Neuro: Alert and oriented X 3, Sensation is grossly intact and No focal deficits  Skin: Warm/Dry, without rashes or lesions  Assessment:  Principal Problem:    Dissection of thoracic aorta (HCC)  Active Problems:    Benign essential hypertension    Abnormal TSH    Benign hypertension with CKD (chronic kidney disease) stage III (HCC)    Benign prostatic hyperplasia without lower urinary tract symptoms    S/P aorta repair    Leukocytosis    Postoperative anemia due to acute blood loss    Anticoagulation goal of INR 2 to 3    AFSHIN (acute kidney injury) ([address] )    Hyperphosphatemia    Hypotension    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, Ascending aortic aneurysm     S/P Replacement of ascending aorta with aggressive hemiarch reconstruction and aortic valve resuspension with a 26 mm Dacron graft; POD # 17    Plan:    1  Cardiac:   NSR; HR/BP well-controlled  Lopressor, 12 5mg PO BID   Midodrine 10mg TID  Continue ASA and Statin therapy  Epicardial pacing wires out  Patient no longer has central IV access   Continue DVT prophylaxis  PAF - Hold Coumadin tonight in anticipation of Permacath placement    Sternal dissociation - pt asymptomatic  Continue to monitor  2  Pulmonary:   Acute post-op pulmonary insufficiency; Requiring 2 liters via nasal cannla, secondary to atelectasis and pneumonia  Continue incentive spirometry/coughing/deep breathing exercises  Wean supplemental oxygen as tolerated for saturation > 90%  Chest tubes have been discontinued    3  Renal:   Intake/Output net: -130 mL/24 hours  AFSHIN on CKD 3 - Post op Creatinine increased, Renal following  HD scheduled for tomorrow  IR consulted for Permacath placement  4  Neuro:  Neurologically intact; No active issues  Incisional pain well-controlled; Continue prn Percocet    5  GI:  Tolerating TLC 2 3 gm sodium diet  Maintain 1800 mL daily fluid restriction   Continue stool softeners and prn suppository  Continue GI prophylaxis    6  Endo:   No history of diabetes; Glucose well-controlled with sliding scale coverage    7    Hematology:     Post-operative acute blood loss anemia; Hemoglobin 9 0; trend prn  Leukocytosis -WBC downtrending, afebrile  ID following  ID: WBC downtrending, afebrile, possible aspiration pneumonia vs atelectasis, sputum positive for Serratia, ID following  Procalcitonin downtrending  Completed Cefepime course  S/P thoracentesis x 2 - fluid negative and blood culture negative to date    8  Disposition:      Patient and family refusing rehab at discharge  Wish to discharge patient to home       VTE Pharmacologic Prophylaxis: Warfarin (Coumadin)  VTE Mechanical Prophylaxis: sequential compression device    Collaborative rounds completed with Steven Flores MD  Plan of care discussed with bedside nurse    SIGNATURE: Jose Alfredo Lloyd PA-C  DATE: January 1, 2020  TIME: 7:14 AM

## 2020-01-01 NOTE — PROGRESS NOTES
Progress Note - Nephrology   [facility] A Core 70 y o  male MRN: [id number]  Unit/Bed#: [facility] 412-01 Encounter: [id number]      Assessment / Plan:  1  Acute kidney injury likely due to postop ATN as well as contrast nephropathy with CT on admission, HD dependent since 19  Temp HD line in place  Recommend permacath when able   -last HD Tues, plan for next HD 19    -renal ultrasound shows right kidney atrophy with small left renal cysts  -I note low C3 with normal C4  Has had proteinuria in the past  Isolated low C3 can be seen in the setting of PIGN  Will defer renal biopsy at this time in light of atrophic kidney   -UA with microscopy shows trace leukocytes, 2-4 RBCs, 10-20 WBCs with 5-10 hyaline casts  -chronic hepatitis panel negative, urine protein electrophoresis shows possible faint band   -anti-GBM, Anca, AMY panel is pending  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, SPEP + for monoclonality  -f/u am BMP  -monitor UOP  2  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 - b/l sCr 1 4-1 8, follows outpatient with Dr Levon Sage  3  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  4  Anemia - Hgb 9, monitor CBC  5  Hyperphosphatemia - last phos improved to 4 8, monitor  6  Hypotension - improved on midodrine, also on metoprolol with hold parameters  7  PNA - s/p cefepime  8  Left pleural effusion s/p thoracentesis ,         Subjective:   He denies any chest pain or shortness of breath  No complaints  He feels well  He has not urinated today  Objective:     Vitals: Blood pressure 118/59, pulse 69, temperature 97 9 °F (36 6 °C), temperature source Oral, resp  rate 18, height 5' 9" (1 753 m), weight 81 5 kg (179 lb 9 6 oz), SpO2 96 %  ,Body mass index is 26 52 kg/m²  Temp (24hrs), Av 9 °F (36 6 °C), Min:97 5 °F (36 4 °C), Max:98 2 °F (36 8 °C)      Weight (last 2 days)     Date/Time   Weight    20 0600   81 5 (179 6)    19 0631 80 3 (177 03)    12/30/19 0633   80 5 (177 47)                Intake/Output Summary (Last 24 hours) at 1/1/2020 1402  Last data filed at 1/1/2020 0750  Gross per 24 hour   Intake 705 ml   Output 625 ml   Net 80 ml     I/O last 24 hours: In: 905 [P  O :905]  Out: 625 [Urine:625]        Physical Exam:   Physical Exam   Constitutional: He appears well-developed and well-nourished  No distress  Thin, temporal wasting   HENT:   Head: Normocephalic and atraumatic  Mouth/Throat: No oropharyngeal exudate  Eyes: Right eye exhibits no discharge  Left eye exhibits no discharge  No scleral icterus  Neck: Neck supple  No thyromegaly present  Cardiovascular: Normal rate, regular rhythm and normal heart sounds  Pulmonary/Chest: Effort normal and breath sounds normal  He has no wheezes  He has no rales  Abdominal: Soft  Bowel sounds are normal  He exhibits no distension  There is no tenderness  Musculoskeletal: Normal range of motion  He exhibits no edema  Neurological: He is alert  awake   Skin: Skin is warm and dry  No rash noted  He is not diaphoretic  Psychiatric: He has a normal mood and affect  His behavior is normal    Vitals reviewed        Invasive Devices     Peripheral Intravenous Line            Peripheral IV 12/29/19 Left Arm 3 days          Hemodialysis Catheter            HD Temporary Double Catheter 8 days                Medications:    Scheduled Meds:  Current Facility-Administered Medications:  acetaminophen 650 mg Oral Q4H PRN Giovany Morrow PA-C   albuterol 2 5 mg Nebulization Q6H PRN Viviankyle Dumont, DO   aspirin 81 mg Oral Daily Sumaya Perry PA-C   bisacodyl 10 mg Rectal Daily PRN Giovany Morrow PA-C   guaiFENesin 600 mg Oral Q12H [address] Sumaya Perry PA-C   insulin lispro 1-5 Units Subcutaneous TID [facility] Alex Linder PA-C   insulin lispro 1-5 Units Subcutaneous HS Alex Linder PA-C   iohexol 50 mL Oral 90 min [facility]-procedure [facility]   levalbuterol 1 25 mg Nebulization BID Sally Perez, DO   menthol-methyl salicylate  Apply externally 4x Daily PRN Ann-Marie Hdez PA-C   metoclopramide 5 mg Intravenous Q8H PRN Qatari Pillion Cecilio RetortTRINY   metoprolol tartrate 12 5 mg Oral Q12H [address] Usha Marroquin MD   midodrine 10 mg Oral TID AC Alysa Graham MD   ondansetron 4 mg Intravenous Q6H Dolores Pedro PA-C   oxyCODONE-acetaminophen 1 tablet Oral Q4H PRN Esther Bumpers, PA-C   oxyCODONE-acetaminophen 2 tablet Oral Q6H PRN Onel Linder PA-C   pantoprazole 40 mg Oral Daily Alex Linder PA-C   polyethylene glycol 17 g Oral Daily Alex Sullivan PA-C   senna-docusate sodium 1 tablet Oral BID Qatari Pillion LugianTRINY way   sodium chloride 3 mL Nebulization BID Sally Perez DO   temazepam 15 mg Oral HS PRN Esther Bumpers, PA-C       PRN Meds:   acetaminophen    albuterol    bisacodyl    menthol-methyl salicylate    metoclopramide    oxyCODONE-acetaminophen    oxyCODONE-acetaminophen    temazepam    Continuous Infusions:         LAB RESULTS:      Results from last 7 days   Lab Units 01/01/20  0553 12/31/19  0457 12/30/19  0441 12/29/19  0435 12/28/19  0455 12/27/19  0516 12/26/19  0522   WBC Thousand/uL 19 09* 21 02* 21 60* 24 57* 26 74* 30 98* 26 38*   HEMOGLOBIN g/dL 9 0* 9 1* 9 7* 9 7* 10 1* 9 9* 11 7*   HEMATOCRIT % 28 5* 28 5* 30 3* 30 2* 31 2* 29 9* 36 9   PLATELETS Thousands/uL 216 184 281 264 268 236 288   NEUTROS PCT %  --   --   --   --   --   --  86*   LYMPHS PCT %  --   --   --   --   --   --  3*   LYMPHO PCT %  --   --  3* 1*  --  2*  --    MONOS PCT %  --   --   --   --   --   --  9   MONO PCT %  --   --  9 8  --  4  --    EOS PCT %  --   --  2 1  --  0 0   POTASSIUM mmol/L 4 4 4 8 4 2 4 4 4 3  --  4 8   CHLORIDE mmol/L 104 104 101 101 96*  --  99*   CO2 mmol/L 27 29 26 29 24  --  23   BUN mg/dL 68* 59* 82* 67* 100*  --  136*   CREATININE mg/dL 5 91* 5 06* 6 46* 5 41* 6 85*  --  6 68*   CALCIUM mg/dL 8 0* 7 7* 8 0* 8 1* 8 1*  --  7 6*   ALK PHOS U/L  --   --   --  154*  --   --   -- ALT U/L  --   --   --  21  --   --   --    AST U/L  --   --   --  32  --   --   --    MAGNESIUM mg/dL  --   --   --   --  2 7*  --   --    PHOSPHORUS mg/dL  --  4 8*  --   --  7 1*  --   --        CUTURES:  Lab Results   Component Value Date    BLOODCX No Growth After 5 Days  12/26/2019    URINECX 10,000-19,000 cfu/ml  03/08/2018                 Portions of the record may have been created with voice recognition software  Occasional wrong word or "sound a like" substitutions may have occurred due to the inherent limitations of voice recognition software  Read the chart carefully and recognize, using context, where substitutions have occurred  If you have any questions, please contact the dictating provider

## 2020-01-01 NOTE — PHYSICAL THERAPY NOTE
Physical Therapy Progress Note     01/01/20 0900   Pain Assessment   Pain Assessment No/denies pain   Pain Score No Pain   Restrictions/Precautions   Other Precautions Cardiac/sternal;O2   Subjective   Subjective The pt  states that he continues to feel better  Transfers   Sit to Stand 5  Supervision   Stand to Sit 5  Supervision   Ambulation/Elevation   Gait pattern Excessively slow; Short stride   Gait Assistance 5  Supervision   Additional items Verbal cues   Assistive Device Rolling walker;None   Distance 280 feet with the rolling walker, 280 feet with no device  Stair Management Assistance 5  Supervision   Additional items Increased time required   Stair Management Technique One rail R;Foreward;Reciprocal   Number of Stairs 5   Balance   Static Sitting Good   Dynamic Sitting Fair +   Static Standing Nicole Mckinney [id number] -  (Fair to Fair plus with [address] minus with no device )   Activity Tolerance   Activity Tolerance Patient tolerated treatment well   Nurse [id number] [facility], RN  Assessment   Prognosis Good   Problem List Decreased endurance; Impaired balance;Decreased mobility   Assessment The pt  has improving mobility as he was able to progress to no device today  He required very close supervision with no device, but his balance did improve the more that he ambulated  He does continue to require increased time to manuever around obstacles especially with no device  He had no difficulty with the stairs today  Barriers to Discharge None   Goals   Patient Goals To get better, and to return home  STG Expiration Date 01/10/20   PT Treatment Day 6   Plan   Treatment/Interventions Functional transfer training;LE strengthening/ROM; Elevations; Therapeutic exercise; Endurance training;Patient/family training;Bed mobility;Gait training   PT Frequency   (4-6x a week )   Recommendation   Recommendation Home PT; Home with family support   Equipment Recommended Cleavon Gone   PT - OK to Discharge Yes     Tenzin Up PTA

## 2020-01-01 NOTE — PLAN OF CARE
Problem: PHYSICAL THERAPY ADULT  Goal: Performs mobility at highest level of function for planned discharge setting  See evaluation for individualized goals  Description  Treatment/Interventions: Functional transfer training, LE strengthening/ROM, Therapeutic exercise, Endurance training, Bed mobility, Gait training, Spoke to nursing, OT  Equipment Recommended: Walker(at this time)       See flowsheet documentation for full assessment, interventions and recommendations  Outcome: Progressing  Note:   Prognosis: Good  Problem List: Decreased endurance, Impaired balance, Decreased mobility  Assessment: The pt  has improving mobility as he was able to progress to no device today  He required very close supervision with no device, but his balance did improve the more that he ambulated  He does continue to require increased time to manuever around obstacles especially with no device  He had no difficulty with the stairs today  Barriers to Discharge: None     Recommendation: Home PT, Home with family support     PT - OK to Discharge: Yes    See flowsheet documentation for full assessment

## 2020-01-02 ENCOUNTER — APPOINTMENT (INPATIENT)
Dept: RADIOLOGY | Facility: HOSPITAL | Age: 80
DRG: 219 | End: 2020-01-02
Payer: COMMERCIAL

## 2020-01-02 ENCOUNTER — APPOINTMENT (INPATIENT)
Dept: DIALYSIS | Facility: HOSPITAL | Age: 80
DRG: 219 | End: 2020-01-02
Payer: COMMERCIAL

## 2020-01-02 LAB
ANION GAP SERPL CALCULATED.3IONS-SCNC: 8 MMOL/L (ref 4–13)
BUN SERPL-MCNC: 84 MG/DL (ref 5–25)
C-ANCA TITR SER IF: NORMAL TITER
CALCIUM SERPL-MCNC: 8.1 MG/DL (ref 8.3–10.1)
CHLORIDE SERPL-SCNC: 107 MMOL/L (ref 100–108)
CO2 SERPL-SCNC: 25 MMOL/L (ref 21–32)
CREAT SERPL-MCNC: 5.9 MG/DL (ref 0.6–1.3)
ERYTHROCYTE [DISTWIDTH] IN BLOOD BY AUTOMATED COUNT: 15.4 % (ref 11.6–15.1)
GBM AB SER IA-ACNC: 3 UNITS (ref 0–20)
GFR SERPL CREATININE-BSD FRML MDRD: 8 ML/MIN/1.73SQ M
GLUCOSE SERPL-MCNC: 102 MG/DL (ref 65–140)
GLUCOSE SERPL-MCNC: 115 MG/DL (ref 65–140)
GLUCOSE SERPL-MCNC: 125 MG/DL (ref 65–140)
GLUCOSE SERPL-MCNC: 159 MG/DL (ref 65–140)
HCT VFR BLD AUTO: 27.1 % (ref 36.5–49.3)
HGB BLD-MCNC: 8.4 G/DL (ref 12–17)
INR PPP: 1.7 (ref 0.84–1.19)
INTERPRETATION UR IFE-IMP: NORMAL
MCH RBC QN AUTO: 30.7 PG (ref 26.8–34.3)
MCHC RBC AUTO-ENTMCNC: 31 G/DL (ref 31.4–37.4)
MCV RBC AUTO: 99 FL (ref 82–98)
MYELOPEROXIDASE AB SER IA-ACNC: <9 U/ML (ref 0–9)
P-ANCA ATYPICAL TITR SER IF: NORMAL TITER
P-ANCA TITR SER IF: NORMAL TITER
PHOSPHATE SERPL-MCNC: 5.8 MG/DL (ref 2.3–4.1)
PLATELET # BLD AUTO: 251 THOUSANDS/UL (ref 149–390)
PMV BLD AUTO: 9.6 FL (ref 8.9–12.7)
POTASSIUM SERPL-SCNC: 4.3 MMOL/L (ref 3.5–5.3)
PROTEINASE3 AB SER IA-ACNC: <3.5 U/ML (ref 0–3.5)
PROTHROMBIN TIME: 19.5 SECONDS (ref 11.6–14.5)
RBC # BLD AUTO: 2.74 MILLION/UL (ref 3.88–5.62)
RYE IGE QN: NEGATIVE
SODIUM SERPL-SCNC: 140 MMOL/L (ref 136–145)
WBC # BLD AUTO: 18.33 THOUSAND/UL (ref 4.31–10.16)

## 2020-01-02 PROCEDURE — 76937 US GUIDE VASCULAR ACCESS: CPT | Performed by: RADIOLOGY

## 2020-01-02 PROCEDURE — 80048 BASIC METABOLIC PNL TOTAL CA: CPT | Performed by: PHYSICIAN ASSISTANT

## 2020-01-02 PROCEDURE — 99152 MOD SED SAME PHYS/QHP 5/>YRS: CPT

## 2020-01-02 PROCEDURE — 02H633Z INSERTION OF INFUSION DEVICE INTO RIGHT ATRIUM, PERCUTANEOUS APPROACH: ICD-10-PCS | Performed by: RADIOLOGY

## 2020-01-02 PROCEDURE — 76937 US GUIDE VASCULAR ACCESS: CPT

## 2020-01-02 PROCEDURE — 84100 ASSAY OF PHOSPHORUS: CPT | Performed by: INTERNAL MEDICINE

## 2020-01-02 PROCEDURE — 99024 POSTOP FOLLOW-UP VISIT: CPT | Performed by: PHYSICIAN ASSISTANT

## 2020-01-02 PROCEDURE — 82948 REAGENT STRIP/BLOOD GLUCOSE: CPT

## 2020-01-02 PROCEDURE — C1894 INTRO/SHEATH, NON-LASER: HCPCS

## 2020-01-02 PROCEDURE — 99153 MOD SED SAME PHYS/QHP EA: CPT

## 2020-01-02 PROCEDURE — 94640 AIRWAY INHALATION TREATMENT: CPT

## 2020-01-02 PROCEDURE — 77001 FLUOROGUIDE FOR VEIN DEVICE: CPT | Performed by: RADIOLOGY

## 2020-01-02 PROCEDURE — 94760 N-INVAS EAR/PLS OXIMETRY 1: CPT

## 2020-01-02 PROCEDURE — 85610 PROTHROMBIN TIME: CPT | Performed by: PHYSICIAN ASSISTANT

## 2020-01-02 PROCEDURE — 85027 COMPLETE CBC AUTOMATED: CPT | Performed by: PHYSICIAN ASSISTANT

## 2020-01-02 PROCEDURE — 90935 HEMODIALYSIS ONE EVALUATION: CPT | Performed by: INTERNAL MEDICINE

## 2020-01-02 PROCEDURE — 77001 FLUOROGUIDE FOR VEIN DEVICE: CPT

## 2020-01-02 PROCEDURE — 99232 SBSQ HOSP IP/OBS MODERATE 35: CPT | Performed by: INTERNAL MEDICINE

## 2020-01-02 PROCEDURE — 36558 INSERT TUNNELED CV CATH: CPT | Performed by: RADIOLOGY

## 2020-01-02 PROCEDURE — 0JH63XZ INSERTION OF TUNNELED VASCULAR ACCESS DEVICE INTO CHEST SUBCUTANEOUS TISSUE AND FASCIA, PERCUTANEOUS APPROACH: ICD-10-PCS | Performed by: RADIOLOGY

## 2020-01-02 PROCEDURE — 36558 INSERT TUNNELED CV CATH: CPT

## 2020-01-02 PROCEDURE — 99152 MOD SED SAME PHYS/QHP 5/>YRS: CPT | Performed by: RADIOLOGY

## 2020-01-02 RX ORDER — MIDAZOLAM HYDROCHLORIDE 2 MG/2ML
INJECTION, SOLUTION INTRAMUSCULAR; INTRAVENOUS CODE/TRAUMA/SEDATION MEDICATION
Status: COMPLETED | OUTPATIENT
Start: 2020-01-02 | End: 2020-01-02

## 2020-01-02 RX ORDER — FENTANYL CITRATE 50 UG/ML
INJECTION, SOLUTION INTRAMUSCULAR; INTRAVENOUS CODE/TRAUMA/SEDATION MEDICATION
Status: COMPLETED | OUTPATIENT
Start: 2020-01-02 | End: 2020-01-02

## 2020-01-02 RX ORDER — HEPARIN SODIUM 1000 [USP'U]/ML
2000 INJECTION, SOLUTION INTRAVENOUS; SUBCUTANEOUS
Status: DISCONTINUED | OUTPATIENT
Start: 2020-01-02 | End: 2020-01-07 | Stop reason: HOSPADM

## 2020-01-02 RX ORDER — WARFARIN SODIUM 1 MG/1
2 TABLET ORAL
Status: COMPLETED | OUTPATIENT
Start: 2020-01-02 | End: 2020-01-02

## 2020-01-02 RX ORDER — HEPARIN SODIUM 1000 [USP'U]/ML
2000 INJECTION, SOLUTION INTRAVENOUS; SUBCUTANEOUS ONCE
Status: DISCONTINUED | OUTPATIENT
Start: 2020-01-02 | End: 2020-01-02

## 2020-01-02 RX ADMIN — METOPROLOL TARTRATE 12.5 MG: 25 TABLET ORAL at 14:53

## 2020-01-02 RX ADMIN — MIDODRINE HYDROCHLORIDE 10 MG: 5 TABLET ORAL at 06:28

## 2020-01-02 RX ADMIN — HEPARIN SODIUM 2000 UNITS: 1000 INJECTION INTRAVENOUS; SUBCUTANEOUS at 10:13

## 2020-01-02 RX ADMIN — INSULIN LISPRO 1 UNITS: 100 INJECTION, SOLUTION INTRAVENOUS; SUBCUTANEOUS at 17:24

## 2020-01-02 RX ADMIN — MIDAZOLAM 0.5 MG: 1 INJECTION INTRAMUSCULAR; INTRAVENOUS at 13:51

## 2020-01-02 RX ADMIN — GUAIFENESIN 600 MG: 600 TABLET, EXTENDED RELEASE ORAL at 21:23

## 2020-01-02 RX ADMIN — FENTANYL CITRATE 25 MCG: 50 INJECTION, SOLUTION INTRAMUSCULAR; INTRAVENOUS at 13:51

## 2020-01-02 RX ADMIN — LEVALBUTEROL HYDROCHLORIDE 1.25 MG: 1.25 SOLUTION, CONCENTRATE RESPIRATORY (INHALATION) at 19:04

## 2020-01-02 RX ADMIN — GUAIFENESIN 600 MG: 600 TABLET, EXTENDED RELEASE ORAL at 14:53

## 2020-01-02 RX ADMIN — ONDANSETRON 4 MG: 2 INJECTION INTRAMUSCULAR; INTRAVENOUS at 17:25

## 2020-01-02 RX ADMIN — FENTANYL CITRATE 25 MCG: 50 INJECTION, SOLUTION INTRAMUSCULAR; INTRAVENOUS at 14:04

## 2020-01-02 RX ADMIN — TEMAZEPAM 15 MG: 15 CAPSULE ORAL at 22:49

## 2020-01-02 RX ADMIN — WARFARIN SODIUM 2 MG: 1 TABLET ORAL at 17:24

## 2020-01-02 RX ADMIN — MIDODRINE HYDROCHLORIDE 10 MG: 5 TABLET ORAL at 14:58

## 2020-01-02 RX ADMIN — ONDANSETRON 4 MG: 2 INJECTION INTRAMUSCULAR; INTRAVENOUS at 01:14

## 2020-01-02 RX ADMIN — ONDANSETRON 4 MG: 2 INJECTION INTRAMUSCULAR; INTRAVENOUS at 06:28

## 2020-01-02 RX ADMIN — FENTANYL CITRATE 25 MCG: 50 INJECTION, SOLUTION INTRAMUSCULAR; INTRAVENOUS at 14:08

## 2020-01-02 RX ADMIN — ISODIUM CHLORIDE 3 ML: 0.03 SOLUTION RESPIRATORY (INHALATION) at 19:04

## 2020-01-02 RX ADMIN — SENNOSIDES AND DOCUSATE SODIUM 1 TABLET: 8.6; 5 TABLET ORAL at 14:53

## 2020-01-02 RX ADMIN — OXYCODONE HYDROCHLORIDE AND ACETAMINOPHEN 1 TABLET: 5; 325 TABLET ORAL at 06:42

## 2020-01-02 RX ADMIN — ASPIRIN 81 MG 81 MG: 81 TABLET ORAL at 14:53

## 2020-01-02 RX ADMIN — SENNOSIDES AND DOCUSATE SODIUM 1 TABLET: 8.6; 5 TABLET ORAL at 21:23

## 2020-01-02 RX ADMIN — PANTOPRAZOLE SODIUM 40 MG: 40 TABLET, DELAYED RELEASE ORAL at 14:53

## 2020-01-02 NOTE — SOCIAL WORK
[facility]  Ramona Hammans ([phone], Ext [phone]) contacted CM to report pt has been assigned 11:00AM chair time on Tu/Th/Sat schedule at [address] [facility] [place] for his ongoing o/p Hemodialysis (HD) treatment for his aftercare  CM to follow

## 2020-01-02 NOTE — PLAN OF CARE
Problem: METABOLIC, FLUID AND ELECTROLYTES - ADULT  Goal: Electrolytes maintained within normal limits  Description  INTERVENTIONS:  - Monitor labs and assess patient for signs and symptoms of electrolyte imbalances  - Administer electrolyte replacement as ordered  - Monitor response to electrolyte replacements, including repeat lab results as appropriate  - Instruct patient on fluid and nutrition as appropriate  Outcome: Progressing     Problem: METABOLIC, FLUID AND ELECTROLYTES - ADULT  Goal: Fluid balance maintained  Description  INTERVENTIONS:  - Monitor labs   - Monitor I/O and WT  - Instruct patient on fluid and nutrition as appropriate  - Assess for signs & symptoms of volume excess or deficit  Outcome: Progressing     Patient is receiving 3 5 hour HD treatment  Ultrafiltration goal is 0 5 L as tolerated  Keep SBP > 100  Plan of care reviewed with patient and Dr Aleena Singh

## 2020-01-02 NOTE — HEMODIALYSIS
Patient completed 3 5 hour HD treatment using RIJ temp catheter  BFR to 400  Venous chamber and dialyzer clotted half way through treatment  Blood returned to patient and restarted on a new system  Heparin 2000 units given per Dr Jagdish Nam  No other issues during treatment  VS stable  Patient denied dizziness, cramping, and SOB  Pre-weight:  81 6 kg  Post-weight:  81 2 kg    Patient is going to IR post HD for permacath placement

## 2020-01-02 NOTE — PROGRESS NOTES
Progress Note - Infectious Disease   Oliver Sharpe 70 y o  male MRN: [id number]  Unit/Bed#: [facility] 412-01 Encounter: [id number]      Impression/Recommendations:  1   Leukocytosis   Likely multifactorial due to #2, dialysis, anemia   UA, LFTs, lipase, blood cultures, CT A/P negative   CT shows retrosternal collection which is likely postsurgical as no evidence of incisional cellulitis/drainage or sternal instability   Clinically stable without fever   Improving with antibiotics  Residual leukocytosis may now be leukomoid to anemia, recent surgery, HD  Rec:  ? Continue to follow closely off antibiotics  ? Follow temperatures and [address]  ? Supportive care as per the primary service     2   Bibasilar pneumonia    Consider due to aspiration versus atelectasis   Sputum with Serratia   Status post left thoracentesis x2 with bland chemistries and negative cultures   Procalcitonin 10 98 > 4 82   Clinically improved status post 7 days IV antibiotics  Rec:  ? Continue to follow closely off antibiotics  ? Continue OOB, incentive spirometry  ? Follow respiratory status closely     3   Acute type A aortic dissection   Status post jordy arch reconstruction cannula aortic valve suspension using Dacron graft 12/15  Rec:  ? Postoperative care per cardiac surgery     4   AFSHIN on CKD   In setting of recent surgery   Now on HD  Rec:  ? Renal follow-up ongoing  ? As blood cultures negative, okay from ID for PermCath if clinically indicated     The patient is stable from an ID standpoint  We will sign off for now  Please call with new questions      Antibiotics:  Off antibiotics # 2    Subjective:  Patient seen on AM rounds  Feels well  Offers no complaints  Denies pain  Denies fevers, chills, sweats, nausea, vomiting, or diarrhea  24 Hour Events:  No documented fevers, chills, sweats, nausea, vomiting, or diarrhea  WBC continues to trend downward      Objective:  Vitals:  Temp:  [98 °F (36 7 °C)-99 °F (37 2 °C)] 98 3 °F (36 8 °C)  HR:  [68-94] 70  Resp:  [16-18] 16  BP: (113-144)/(58-84) 117/69  SpO2:  [92 %-94 %] 93 %  Temp (24hrs), Av 4 °F (36 9 °C), Min:98 °F (36 7 °C), Max:99 °F (37 2 °C)  Current: Temperature: 98 3 °F (36 8 °C)    Physical Exam:   General:  No acute distress  Psychiatric:  Awake and alert  Chest:  Sternal wound remains intact with surgical glue without erythema or drainage  Pulmonary:  Normal respiratory excursion without accessory muscle use  Abdomen:  Soft, nontender  Extremities:  No edema  Skin:  No rashes    Lab Results:  I have personally reviewed pertinent labs  Results from last 7 days   Lab Units 20  0434 20  0553 19  0457  19  0435   POTASSIUM mmol/L 4 3 4 4 4 8   < > 4 4   CHLORIDE mmol/L 107 104 104   < > 101   CO2 mmol/L 25 27 29   < > 29   BUN mg/dL 84* 68* 59*   < > 67*   CREATININE mg/dL 5 90* 5 91* 5 06*   < > 5 41*   EGFR ml/min/1 73sq m 8 8 10   < > 9   CALCIUM mg/dL 8 1* 8 0* 7 7*   < > 8 1*   AST U/L  --   --   --   --  32   ALT U/L  --   --   --   --  21   ALK PHOS U/L  --   --   --   --  154*    < > = values in this interval not displayed  Results from last 7 days   Lab Units 20  0434 20  0553 19  0457   WBC Thousand/uL 18 33* 19 09* 21 02*   HEMOGLOBIN g/dL 8 4* 9 0* 9 1*   PLATELETS Thousands/uL 251 216 184     Results from last 7 days   Lab Units 19  1529 19  1010 19  1624   BLOOD CULTURE   --   --  No Growth After 5 Days  GRAM STAIN RESULT  1+ Polys  No organisms seen 2+ Polys  No bacteria seen  --    BODY FLUID CULTURE, STERILE  No growth No growth  --        Imaging Studies:   I have personally reviewed pertinent imaging study reports and images in PACS  EKG, Pathology, and Other Studies:   I have personally reviewed pertinent reports

## 2020-01-02 NOTE — PROGRESS NOTES
Progress Note - Cardiothoracic Surgery   [facility] A Core 70 y o  male MRN: [id number]  Unit/Bed#: [facility] 412-01 Encounter: [id number]      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, Ascending aortic aneurysm     S/P Replacement of ascending aorta with aggressive hemiarch reconstruction and aortic valve resuspension with a 26 mm Dacron graft; POD # 17      24 Hour Events: No events overnight  Feels well today  Denies CP or SOB  Scheduled for HD today and Permacath placement this afternoon        Medications:   Scheduled Meds:    Current Facility-Administered Medications:  acetaminophen 650 mg Oral Q4H PRN Albertina Trotter PA-C   albuterol 2 5 mg Nebulization Q6H PRN Raffy Zepeda DO   aspirin 81 mg Oral Daily Sumaya Perry PA-C   bisacodyl 10 mg Rectal Daily PRN Albertina Trotter PA-C   guaiFENesin 600 mg Oral Q12H [facility] Sumaya Perry PA-C   insulin lispro 1-5 Units Subcutaneous TID [facility] Alex Linder PA-C   insulin lispro 1-5 Units Subcutaneous HS Alex Linder PA-C   iohexol 50 mL Oral 90 min pre-procedure Karena May PA-C   levalbuterol 1 25 mg Nebulization BID Raffy Zepeda DO   menthol-methyl salicylate  Apply externally 4x Daily PRN Ann-Marie Hdez PA-C   metoclopramide 5 mg Intravenous Q8H PRN Leticia Skip Knott PA-C   metoprolol tartrate 12 5 mg Oral Q12H [facility] Cynthia Santana MD   midodrine 10 mg Oral TID AC Deep Duque MD   ondansetron 4 mg Intravenous Q6H Nakita Florez PA-C   oxyCODONE-acetaminophen 1 tablet Oral Q4H PRN Albertina Trotter PA-C   oxyCODONE-acetaminophen 2 tablet Oral Q6H PRN Maxwell Linder PA-C   pantoprazole 40 mg Oral Daily Alex Linder PA-C   polyethylene glycol 17 g Oral Daily Alex Sullivan PA-C   senna-docusate sodium 1 tablet Oral BID Leticiaerik Weaver PA-C   sodium chloride 3 mL Nebulization BID Raffy Le, DO   temazepam 15 mg Oral HS PRN Maxwell Linder PA-C     Continuous Infusions:   PRN Meds:   acetaminophen    albuterol    bisacodyl    menthol-methyl salicylate    metoclopramide    oxyCODONE-acetaminophen    oxyCODONE-acetaminophen    temazepam    Vitals:   Vitals:    01/01/20 2310 01/02/20 0233 01/02/20 0604 01/02/20 0711   BP: 135/67 119/70  131/66   BP Location: Right arm Right arm  Right arm   Pulse: 75 70  70   Resp: 18 18  18   Temp: 98 2 °F (36 8 °C) 98 °F (36 7 °C)  98 3 °F (36 8 °C)   TempSrc: Oral Oral  Oral   SpO2: 93% 92%  93%   Weight:   81 9 kg (180 lb 8 9 oz)    Height:           Telemetry: NSR  Heart Rate: 79    Respiratory:   SpO2: SpO2: 93 %; 2 LPM    Intake/Output: -145 ml / 24 hrs  I/O       12/28 0701 - 12/29 0700 12/29 0701 - 12/30 0700 12/30 0701 - 12/31 0700    P  O  720 1050     I V  (mL/kg) 500 (6 3)      Total Intake(mL/kg) 1220 (15 3) 1050 (13)     Urine (mL/kg/hr) 100 (0 1) 450 (0 2)     Emesis/NG output 0      Other 301      Stool 0      Total Output 401 450     Net +819 +600            Unmeasured Urine Occurrence 2 x      Unmeasured Stool Occurrence 3 x      Unmeasured Emesis Occurrence 1 x                Weights:   Weight (last 2 days)     Date/Time   Weight    01/02/20 0604   81 9 (180 56)    01/01/20 0600   81 5 (179 6)    12/31/19 0631   80 3 (177 03)            Admit weight: 83 5 kg    Results:   Results from last 7 days   Lab Units 01/02/20  0434 01/01/20  0553 12/31/19  0457   WBC Thousand/uL 18 33* 19 09* 21 02*   HEMOGLOBIN g/dL 8 4* 9 0* 9 1*   HEMATOCRIT % 27 1* 28 5* 28 5*   PLATELETS Thousands/uL 251 216 184     Results from last 7 days   Lab Units 01/02/20  0434 01/01/20  0553 12/31/19  0457   SODIUM mmol/L 140 138 138   POTASSIUM mmol/L 4 3 4 4 4 8   CHLORIDE mmol/L 107 104 104   CO2 mmol/L 25 27 29   BUN mg/dL 84* 68* 59*   CREATININE mg/dL 5 90* 5 91* 5 06*   CALCIUM mg/dL 8 1* 8 0* 7 7*     Results from last 7 days   Lab Units 01/02/20  0434 01/01/20  0553 12/31/19  0457   INR  1 70* 1 90* 1 90*       Coumadin mg 0, 0, 1 1 1 0 0 0     Procalcitonin 12/30: 4 82 (10 98)    Point of care glucose:  - 139   No units SSIC / 24 hrs required    Studies:  No studies past 24 hrs      Invasive Lines/Tubes:  Invasive Devices     Peripheral Intravenous Line            Peripheral IV 12/29/19 Left Arm 3 days    Peripheral IV 01/01/20 Right;Ventral (anterior) Wrist less than 1 day          Hemodialysis Catheter            HD Temporary Double Catheter 9 days                Physical Exam:    HEENT/NECK:  PERRLA  No jugular venous distention  Cardiac: Regular rate and rhythm and No murmurs/rubs/gallops  Pulmonary:  Breath sounds clear bilaterally and No rales/rhonchi/wheezes  Abdomen:  Non-tender, Non-distended and Normal bowel sounds  Incisions: Sternal and axillary cannulation incision well healed, C/D/I  Sternal instability/click noted with deep breaths/coughing  Extremities: Extremities warm/dry, Radial pulses 2+ bilaterally and No edema B/L  Neuro: Alert and oriented X 3, Sensation is grossly intact and No focal deficits  Skin: Warm/Dry, without rashes or lesions  Assessment:  Principal Problem:    Dissection of thoracic aorta (HCC)  Active Problems:    Benign essential hypertension    Abnormal TSH    Benign hypertension with CKD (chronic kidney disease) stage III (HCC)    Benign prostatic hyperplasia without lower urinary tract symptoms    S/P aorta repair    Leukocytosis    Postoperative anemia due to acute blood loss    Anticoagulation goal of INR 2 to 3    AFSHIN (acute kidney injury) ([address] )    Hyperphosphatemia    Hypotension    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, Ascending aortic aneurysm     S/P Replacement of ascending aorta with aggressive hemiarch reconstruction and aortic valve resuspension with a 26 mm Dacron graft; POD # 17    Plan:    1   Cardiac:   NSR; HR/BP well-controlled  Lopressor, 12 5mg PO BID   Midodrine 10mg TID  Continue ASA and Statin therapy  Epicardial pacing wires out  Patient no longer has central IV access   Continue DVT prophylaxis  PAF - Coumadin 2mg tonight  Sternal dissociation - pt asymptomatic  Continue to monitor  2  Pulmonary:   Acute post-op pulmonary insufficiency; Requiring 2 liters via nasal cannla, secondary to atelectasis and pneumonia  Continue incentive spirometry/coughing/deep breathing exercises  Wean supplemental oxygen as tolerated for saturation > 90%  Chest tubes have been discontinued    3  Renal:   Intake/Output net: -145 mL/24 hours  AFSHIN on CKD 3 - Post op Creatinine increased, Renal following  Scheduled for HD and Permacath placement today  4  Neuro:  Neurologically intact; No active issues  Incisional pain well-controlled; Continue prn Percocet    5  GI:  Tolerating TLC 2 3 gm sodium diet  Maintain 1800 mL daily fluid restriction   Continue stool softeners and prn suppository  Continue GI prophylaxis    6  Endo:   No history of diabetes; Glucose well-controlled with sliding scale coverage    7    Hematology:     Post-operative acute blood loss anemia; Hemoglobin 9 1; trend prn  Leukocytosis -WBC stable, afebrile  ID following  ID: WBC downtrending, afebrile, possible aspiration pneumonia vs atelectasis, sputum positive for Serratia, ID following  Cefepime course completed  Procalcitonin downtrending  S/P thoracentesis x 2 - fluid negative and blood culture negative to date    8  Disposition:      Ancitipate discharge home tomorrow  VTE Pharmacologic Prophylaxis: Warfarin (Coumadin)  VTE Mechanical Prophylaxis: sequential compression device    Collaborative rounds completed with CHIDI French    Plan of care discussed with bedside nurse    SIGNATURE: Clare Rivero PA-C  DATE: January 2, 2020  TIME: 8:20 AM

## 2020-01-02 NOTE — BRIEF OP NOTE (RAD/CATH)
[facility] Procedure Note    PATIENT NAME: Martin SEBASTIAN Core  : 1940  MRN: [id number]    Pre-op Diagnosis:   1  Dissection of thoracic aorta ([address] )    2  S/P aorta repair    3  Intramural aortic hematoma ([address] )    4  Acute kidney injury superimposed on chronic kidney disease ([address] )    5  Abnormal sputum      Post-op Diagnosis:   1  Dissection of thoracic aorta ([address] )    2  S/P aorta repair    3  Intramural aortic hematoma ([address] )    4  Acute kidney injury superimposed on chronic kidney disease ([address] )    5  Abnormal sputum        Surgeon:   Ryanne Castillo MD  Assistants:     No qualified resident was available, Resident is only observing    Estimated Blood Loss:  Minimal  Findings:  Successful tunneled dialysis catheter placement      Specimens:  None    Complications:  None    Anesthesia: Conscious sedation    Ryanne Castillo MD     Date: 2020  Time: 2:17 PM

## 2020-01-02 NOTE — OCCUPATIONAL THERAPY NOTE
Occupational Therapy Cancellation Note        Patient Name: Mag Jamesns Date: 1/2/2020      Chart reviewed, noted that pt is currently off the floor at dialysis  Will hold OT at this time and continue to follow as medically appropriate and able       CHELSEA Mcmillan, OTR/L

## 2020-01-02 NOTE — PROGRESS NOTES
Procedure Note - Nephrology   [facility] A Core 70 y o  male MRN: [id number]  Unit/Bed#: [facility] 412-01 Encounter: [id number]    Procedure note-hemodialysis(88503)  Assessment / Plan:  1  Acute kidney injury likely due to postop ATN as well as contrast nephropathy with CT on admission, HD dependent since 12/23/19  Temp HD line in place  Recommend permacath when able   -patient seen & examined by me on hemodialysis today  -renal ultrasound shows right kidney atrophy with small left renal cysts  -I note low C3 with normal C4  Has had proteinuria in the past  Isolated low C3 can be seen in the setting of PIGN  Will defer renal biopsy at this time in light of atrophic right kidney  sCr remained stable despite no HD yesterday  Hopeful for slow possibly recovery  -UA with microscopy shows trace leukocytes, 2-4 RBCs, 10-20 WBCs with 5-10 hyaline casts  -chronic hepatitis panel negative, urine protein electrophoresis shows possible faint band   -anti-GBM, Anca, AMY panel is pending  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, SPEP + for monoclonality  -f/u am BMP  -monitor UOP  2  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 - b/l sCr 1 4-1 8, follows outpatient with Dr Irene Paez  3  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  4  Anemia - Hgb 8 4, monitor CBC, transfuse p r n  For hemoglobin less than 7  5  Hyperphosphatemia - last phos 5 8 monitor phos,  expect improvement after dialysis  6  Hypotension - improved on midodrine, also on metoprolol with hold parameters  7  PNA - s/p cefepime  8  Left pleural effusion s/p thoracentesis 12/24, 12/27        Subjective:   Patient seen examined by me on hemodialysis approximately 9:15 a m     Blood pressure 130/75, UF goal 0 5L, blood flow 300 mL/min, dialysate flow 600 mL/minute  Jordin Jiang He does complain of some back pain  No other complaints        Objective:     Vitals: Blood pressure 144/84, pulse 75, temperature 98 3 °F (36 8 °C), temperature source Oral, resp  rate 16, height 5' 9" (1 753 m), weight 81 9 kg (180 lb 8 9 oz), SpO2 93 %  ,Body mass index is 26 66 kg/m²  Temp (24hrs), Av 4 °F (36 9 °C), Min:97 9 °F (36 6 °C), Max:99 °F (37 2 °C)      Weight (last 2 days)     Date/Time   Weight    20 0604   81 9 (180 56)    20 0600   81 5 (179 6)    19 0631   80 3 (177 03)                Intake/Output Summary (Last 24 hours) at 2020 0923  Last data filed at 2020 0820  Gross per 24 hour   Intake 300 ml   Output 650 ml   Net -350 ml     I/O last 24 hours: In: 705 [P O :505; I V :200]  Out: 650 [Urine:650]        Physical Exam:   Physical Exam   Constitutional: He appears well-developed and well-nourished  No distress  thin   HENT:   Head: Normocephalic and atraumatic  Mouth/Throat: No oropharyngeal exudate  Eyes: Right eye exhibits no discharge  Left eye exhibits no discharge  No scleral icterus  Neck: Neck supple  No thyromegaly present  Cardiovascular: Normal rate, regular rhythm and normal heart sounds  Pulmonary/Chest: Effort normal and breath sounds normal  He has no wheezes  He has no rales  Abdominal: Soft  Bowel sounds are normal  He exhibits no distension  There is no tenderness  Musculoskeletal: Normal range of motion  He exhibits no edema  Neurological: He is alert  awake   Skin: Skin is warm and dry  No rash noted  He is not diaphoretic  Psychiatric: He has a normal mood and affect  His behavior is normal    Vitals reviewed        Invasive Devices     Peripheral Intravenous Line            Peripheral IV 19 Left Arm 3 days    Peripheral IV 20 Right;Ventral (anterior) Wrist less than 1 day          Hemodialysis Catheter            HD Temporary Double Catheter 9 days                Medications:    Scheduled Meds:  Current Facility-Administered Medications:  acetaminophen 650 mg Oral Q4H PRN Jillian Delacruz PA-C   albuterol 2 5 mg Nebulization Q6H PRN Carmen Martinez DO   aspirin 81 mg Oral Daily Sumaya Perry PA-C   bisacodyl 10 mg Rectal Daily PRN Ulysees Sandhoff Cross, PA-C   guaiFENesin 600 mg Oral Q12H [facility] Sumaya Perry PA-C   insulin lispro 1-5 Units Subcutaneous TID AC [facility] TRINY Linder   insulin lispro 1-5 Units Subcutaneous HS [facility] TRINY Linder   iohexol 50 mL Oral 90 min pre-procedure [facility], TRINY   levalbuterol 1 25 mg Nebulization BID Bula Breeding, DO   menthol-methyl salicylate  Apply externally 4x Daily PRN Ann-Marie Hdez PA-C   metoclopramide 5 mg Intravenous Q8H PRN Marleni Garcia PA-C   metoprolol tartrate 12 5 mg Oral Q12H [facility] Vern Enriquez MD   midodrine 10 mg Oral TID AC Venancio Dominique MD   ondansetron 4 mg Intravenous Q6H [facility], TRINY   oxyCODONE-acetaminophen 1 tablet Oral Q4H PRN Collin Gaffney PA-C   oxyCODONE-acetaminophen 2 tablet Oral Q6H PRN Ulysees Sandhoff Cross, PA-C   pantoprazole 40 mg Oral Daily Alex M TRINY Linder   polyethylene glycol 17 g Oral Daily [facility] Samuel Acosta PA-C   senna-docusate sodium 1 tablet Oral BID Marleni Weaver PA-C   sodium chloride 3 mL Nebulization BID Bula Breeding, DO   temazepam 15 mg Oral HS PRN Collin Gaffney PA-C   warfarin 2 mg Oral Once (warfarin) [facility], TRINY       PRN Meds:   acetaminophen    albuterol    bisacodyl    menthol-methyl salicylate    metoclopramide    oxyCODONE-acetaminophen    oxyCODONE-acetaminophen    temazepam    Continuous Infusions:         LAB RESULTS:      Results from last 7 days   Lab Units 01/02/20  0434 01/01/20  0553 12/31/19  0457 12/30/19  0441 12/29/19  0435 12/28/19  0455 12/27/19  0516   WBC Thousand/uL 18 33* 19 09* 21 02* 21 60* 24 57* 26 74* 30 98*   HEMOGLOBIN g/dL 8 4* 9 0* 9 1* 9 7* 9 7* 10 1* 9 9*   HEMATOCRIT % 27 1* 28 5* 28 5* 30 3* 30 2* 31 2* 29 9*   PLATELETS Thousands/uL 251 216 184 281 264 268 236   LYMPHO PCT %  --   --   --  3* 1*  --  2*   MONO PCT %  --   --   --  9 8  --  4   EOS PCT %  --   --   --  2 1  --  0   POTASSIUM mmol/L 4 3 4 4 4 8 4 2 4 4 4  3  --    CHLORIDE mmol/L 107 104 104 101 101 96*  --    CO2 mmol/L 25 27 29 26 29 24  --    BUN mg/dL 84* 68* 59* 82* 67* 100*  --    CREATININE mg/dL 5 90* 5 91* 5 06* 6 46* 5 41* 6 85*  --    CALCIUM mg/dL 8 1* 8 0* 7 7* 8 0* 8 1* 8 1*  --    ALK PHOS U/L  --   --   --   --  154*  --   --    ALT U/L  --   --   --   --  21  --   --    AST U/L  --   --   --   --  32  --   --    MAGNESIUM mg/dL  --   --   --   --   --  2 7*  --    PHOSPHORUS mg/dL 5 8*  --  4 8*  --   --  7 1*  --        CUTURES:  Lab Results   Component Value Date    BLOODCX No Growth After 5 Days  12/26/2019    URINECX 10,000-19,000 cfu/ml  03/08/2018                 Portions of the record may have been created with voice recognition software  Occasional wrong word or "sound a like" substitutions may have occurred due to the inherent limitations of voice recognition software  Read the chart carefully and recognize, using context, where substitutions have occurred  If you have any questions, please contact the dictating provider

## 2020-01-02 NOTE — UTILIZATION REVIEW
Continued Stay Review    Date: 01/01/2020                        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, Ascending aortic aneurysm     S/P Replacement of ascending aorta with aggressive hemiarch reconstruction and aortic valve resuspension with a 26 mm Dacron graft; POD # 17  Current Patient Class: Inpatient  Current Level of Care: Med/Surg    HPI:79 y o  male initially admitted on 12/14/2019     Assessment/Plan: Acute kidney injury likely due to postop ATN as well as contrast nephropathy with CT on admission, HD dependent since 12/23/19  Temp HD line in place  Recommend permacath when able  last HD Tues, plan for next HD Thursday 1/2/19    renal ultrasound shows right kidney atrophy with small left renal cysts  I note low C3 with normal C4  Has had proteinuria in the past  Isolated low C3 can be seen in the setting of PIGN  Will defer renal biopsy at this time in light of atrophic kidney  UA with microscopy shows trace leukocytes, 2-4 RBCs, 10-20 WBCs with 5-10 hyaline casts  chronic hepatitis panel negative, urine protein electrophoresis shows possible faint band  anti-GBM, Anca, AMY panel is pending  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, SPEP + for monoclonality  f/u am BMP  monitor UOP  12/31/2019    Patient requiring permacath for long term dialysis  Pt presented to hospital with aortic dissection with subsequent AFSHIN  Temp HD catheter was placed by IR 12/23  He continues to require HD  Currently WBC is 21  ID has signed off on patient for permacath placement due to blood cultures being negative  No documented temps in last 48 hours  INR was 1 9 this AM  We will recheck INR Thursday morning  Please keep patient NPO after midnight Wednesday in preparation for hopeful permacath Thursday pending INR       Pertinent Labs/Diagnostic Results:   Results from last 7 days   Lab Units 01/02/20  0434 01/01/20  0553 12/31/19  0457 12/30/19  0441 12/29/19  0435   WBC Thousand/uL 18 33* 19 09* 21 02* 21 60* 24 57*   HEMOGLOBIN g/dL 8 4* 9 0* 9 1* 9 7* 9 7*   HEMATOCRIT % 27 1* 28 5* 28 5* 30 3* 30 2*   PLATELETS Thousands/uL 251 216 184 281 264     Results from last 7 days   Lab Units 01/02/20  0434 01/01/20  0553 12/31/19  0457 12/30/19  0441 12/29/19  0435 12/28/19  0455   SODIUM mmol/L 140 138 138 137 136 133*   POTASSIUM mmol/L 4 3 4 4 4 8 4 2 4 4 4 3   CHLORIDE mmol/L 107 104 104 101 101 96*   CO2 mmol/L 25 27 29 26 29 24   ANION GAP mmol/L 8 7 5 10 6 13   BUN mg/dL 84* 68* 59* 82* 67* 100*   CREATININE mg/dL 5 90* 5 91* 5 06* 6 46* 5 41* 6 85*   EGFR ml/min/1 73sq m 8 8 10 8 9 7   CALCIUM mg/dL 8 1* 8 0* 7 7* 8 0* 8 1* 8 1*   MAGNESIUM mg/dL  --   --   --   --   --  2 7*   PHOSPHORUS mg/dL 5 8*  --  4 8*  --   --  7 1*     Results from last 7 days   Lab Units 12/30/19 0441 12/29/19  0435   AST U/L  --  32   ALT U/L  --  21   ALK PHOS U/L  --  154*   TOTAL PROTEIN g/dL 5 5* 5 8*   ALBUMIN g/dL  --  1 9*   TOTAL BILIRUBIN mg/dL  --  0 83     Results from last 7 days   Lab Units 01/02/20  0602 01/01/20  2058 01/01/20  1608 01/01/20  1115 01/01/20  0547 12/31/19  2315 12/31/19  1604 12/31/19  1045 12/31/19  0552 12/30/19  2103   POC GLUCOSE mg/dl 115 132 139 112 109 143* 133 99 136 155*     Results from last 7 days   Lab Units 01/02/20  0434 01/01/20  0553 12/31/19  0457 12/30/19  0441 12/29/19  0435 12/28/19  0455   GLUCOSE RANDOM mg/dL 102 100 110 93 100 119     Results from last 7 days   Lab Units 01/02/20  0434 01/01/20  0553 12/31/19  0457   PROTIME seconds 19 5* 21 3* 21 3*   INR  1 70* 1 90* 1 90*     Results from last 7 days   Lab Units 12/30/19  0441 12/28/19  0455   PROCALCITONIN ng/ml 4 82* 10 98*     Results from last 7 days   Lab Units 12/30/19  0441   HEP B S AG  Non-reactive   HEP C AB  Non-reactive   HEP B C IGM  Non-reactive   HEP B C TOTAL AB  Non-reactive     Results from last 7 days   Lab Units 12/28/19  0455   LIPASE u/L 265     Results from last 7 days Lab Units 12/27/19  1529 12/27/19  1010 12/26/19  1624   BLOOD CULTURE   --   --  No Growth After 5 Days     GRAM STAIN RESULT  1+ Polys  No organisms seen 2+ Polys  No bacteria seen  --    BODY FLUID CULTURE, STERILE  No growth No growth  --      Vital Signs:     12/31/19 2200 12/31/19 2253 01/01/20 0329 01/01/20 0600   BP: 112/58 121/79 105/63     BP Location:     Right arm     Pulse: 72 75 65     Resp: 18   18     Temp: 98 °F (36 7 °C)   98 °F (36 7 °C)     TempSrc: Oral   Oral     SpO2: 93%   95%     Weight:       81 5 kg (179 lb 9 6 oz)   Height:                    Telemetry: NSR  Heart Rate: 81     Respiratory:   SpO2: SpO2: 95 %; 2 LPM    Medications:   acetaminophen 650 mg Oral Q4H PRN Primitivo Hill PA-C   albuterol 2 5 mg Nebulization Q6H PRN Jose Saunders DO   aspirin 81 mg Oral Daily Sumaya Perry PA-C   bisacodyl 10 mg Rectal Daily PRN Primitivo Hill PA-C   guaiFENesin 600 mg Oral Q12H [address] Sumaya Perry PA-C   insulin lispro 1-5 Units Subcutaneous TID AC Alex Linder PA-C   insulin lispro 1-5 Units Subcutaneous HS Alex Linder PA-C   iohexol 50 mL Oral 90 min pre-procedure Garrick Funez PA-C   levalbuterol 1 25 mg Nebulization BID Jose Saunders DO   menthol-methyl salicylate   Apply externally 4x Daily PRN Ann-Marie Hdez PA-C   metoclopramide 5 mg Intravenous Q8H PRN Lani Weaver PA-C   metoprolol tartrate 12 5 mg Oral Q12H [address] Faye Nunn MD   midodrine 10 mg Oral TID AC Padma Choudhary MD   ondansetron 4 mg Intravenous Q6H Garima Medina PA-C   oxyCODONE-acetaminophen 1 tablet Oral Q4H PRN Primitivo Hill PA-C   oxyCODONE-acetaminophen 2 tablet Oral Q6H PRN Zaida Linder PA-C   pantoprazole 40 mg Oral Daily Alex Linder PA-C   polyethylene glycol 17 g Oral Daily Alex Linder PA-C   senna-docusate sodium 1 tablet Oral BID Lani Weaver PA-C   sodium chloride 3 mL Nebulization BID Jose Saunders DO   temazepam 15 mg Oral HS PRN Primitivo Hill PA-C       Discharge Plan: TBD    Network Utilization Review Department  [email or web address]  ATTENTION: Please call with any questions or concerns to [phone] and carefully listen to the prompts so that you are directed to the right person  All voicemails are confidential   Dior Olivo all requests for admission clinical reviews, approved or denied determinations and any other requests to dedicated fax number below belonging to the campus where the patient is receiving treatment   List of dedicated fax numbers for the Facilities:  [address] DENIALS (Administrative/Medical Necessity) [phone]   [address] (Maternity/NICU/Pediatrics) [phone]   Amy Courser [phone]   [facility] [phone]   Yves Kevin [phone]   [facility] [phone]   [address] [place] [address] [phone]   [facility]  [phone]   [address] [place] [address], [address]  [address] [facility] [address] [facility] [phone]

## 2020-01-03 PROBLEM — D72.829 LEUKOCYTOSIS: Status: RESOLVED | Noted: 2019-12-19 | Resolved: 2020-01-03

## 2020-01-03 LAB
ANION GAP SERPL CALCULATED.3IONS-SCNC: 8 MMOL/L (ref 4–13)
BASOPHILS # BLD MANUAL: 0.17 THOUSAND/UL (ref 0–0.1)
BASOPHILS NFR MAR MANUAL: 1 % (ref 0–1)
BUN SERPL-MCNC: 48 MG/DL (ref 5–25)
CALCIUM SERPL-MCNC: 8.2 MG/DL (ref 8.3–10.1)
CHLORIDE SERPL-SCNC: 103 MMOL/L (ref 100–108)
CO2 SERPL-SCNC: 27 MMOL/L (ref 21–32)
CREAT SERPL-MCNC: 4.4 MG/DL (ref 0.6–1.3)
EOSINOPHIL # BLD MANUAL: 0.66 THOUSAND/UL (ref 0–0.4)
EOSINOPHIL NFR BLD MANUAL: 4 % (ref 0–6)
ERYTHROCYTE [DISTWIDTH] IN BLOOD BY AUTOMATED COUNT: 15.6 % (ref 11.6–15.1)
GFR SERPL CREATININE-BSD FRML MDRD: 12 ML/MIN/1.73SQ M
GLUCOSE SERPL-MCNC: 101 MG/DL (ref 65–140)
GLUCOSE SERPL-MCNC: 105 MG/DL (ref 65–140)
GLUCOSE SERPL-MCNC: 119 MG/DL (ref 65–140)
GLUCOSE SERPL-MCNC: 132 MG/DL (ref 65–140)
GLUCOSE SERPL-MCNC: 178 MG/DL (ref 65–140)
HCT VFR BLD AUTO: 30.2 % (ref 36.5–49.3)
HGB BLD-MCNC: 9.3 G/DL (ref 12–17)
INR PPP: 1.48 (ref 0.84–1.19)
LYMPHOCYTES # BLD AUTO: 0.33 THOUSAND/UL (ref 0.6–4.47)
LYMPHOCYTES # BLD AUTO: 2 % (ref 14–44)
MCH RBC QN AUTO: 30.4 PG (ref 26.8–34.3)
MCHC RBC AUTO-ENTMCNC: 30.8 G/DL (ref 31.4–37.4)
MCV RBC AUTO: 99 FL (ref 82–98)
METAMYELOCYTES NFR BLD MANUAL: 1 % (ref 0–1)
MONOCYTES # BLD AUTO: 1.32 THOUSAND/UL (ref 0–1.22)
MONOCYTES NFR BLD: 8 % (ref 4–12)
MYELOCYTES NFR BLD MANUAL: 1 % (ref 0–1)
NEUTROPHILS # BLD MANUAL: 13.4 THOUSAND/UL (ref 1.85–7.62)
NEUTS BAND NFR BLD MANUAL: 1 % (ref 0–8)
NEUTS SEG NFR BLD AUTO: 80 % (ref 43–75)
NRBC BLD AUTO-RTO: 0 /100 WBCS
PHOSPHATE SERPL-MCNC: 5 MG/DL (ref 2.3–4.1)
PLATELET # BLD AUTO: 240 THOUSANDS/UL (ref 149–390)
PLATELET BLD QL SMEAR: ADEQUATE
PMV BLD AUTO: 9.9 FL (ref 8.9–12.7)
POLYCHROMASIA BLD QL SMEAR: PRESENT
POTASSIUM SERPL-SCNC: 4.4 MMOL/L (ref 3.5–5.3)
PROTHROMBIN TIME: 17.5 SECONDS (ref 11.6–14.5)
RBC # BLD AUTO: 3.06 MILLION/UL (ref 3.88–5.62)
RBC MORPH BLD: PRESENT
SODIUM SERPL-SCNC: 138 MMOL/L (ref 136–145)
VARIANT LYMPHS # BLD AUTO: 2 %
WBC # BLD AUTO: 16.54 THOUSAND/UL (ref 4.31–10.16)

## 2020-01-03 PROCEDURE — 85027 COMPLETE CBC AUTOMATED: CPT | Performed by: INTERNAL MEDICINE

## 2020-01-03 PROCEDURE — 99232 SBSQ HOSP IP/OBS MODERATE 35: CPT | Performed by: INTERNAL MEDICINE

## 2020-01-03 PROCEDURE — 80048 BASIC METABOLIC PNL TOTAL CA: CPT | Performed by: INTERNAL MEDICINE

## 2020-01-03 PROCEDURE — 94640 AIRWAY INHALATION TREATMENT: CPT

## 2020-01-03 PROCEDURE — 99024 POSTOP FOLLOW-UP VISIT: CPT | Performed by: THORACIC SURGERY (CARDIOTHORACIC VASCULAR SURGERY)

## 2020-01-03 PROCEDURE — 84100 ASSAY OF PHOSPHORUS: CPT | Performed by: INTERNAL MEDICINE

## 2020-01-03 PROCEDURE — 85610 PROTHROMBIN TIME: CPT | Performed by: PHYSICIAN ASSISTANT

## 2020-01-03 PROCEDURE — 82948 REAGENT STRIP/BLOOD GLUCOSE: CPT

## 2020-01-03 PROCEDURE — 85007 BL SMEAR W/DIFF WBC COUNT: CPT | Performed by: INTERNAL MEDICINE

## 2020-01-03 PROCEDURE — 94760 N-INVAS EAR/PLS OXIMETRY 1: CPT

## 2020-01-03 RX ADMIN — METOPROLOL TARTRATE 12.5 MG: 25 TABLET ORAL at 20:59

## 2020-01-03 RX ADMIN — METOPROLOL TARTRATE 12.5 MG: 25 TABLET ORAL at 08:43

## 2020-01-03 RX ADMIN — POLYETHYLENE GLYCOL 3350 17 G: 17 POWDER, FOR SOLUTION ORAL at 08:44

## 2020-01-03 RX ADMIN — MIDODRINE HYDROCHLORIDE 10 MG: 5 TABLET ORAL at 11:56

## 2020-01-03 RX ADMIN — GUAIFENESIN 600 MG: 600 TABLET, EXTENDED RELEASE ORAL at 08:44

## 2020-01-03 RX ADMIN — ISODIUM CHLORIDE 3 ML: 0.03 SOLUTION RESPIRATORY (INHALATION) at 19:10

## 2020-01-03 RX ADMIN — PANTOPRAZOLE SODIUM 40 MG: 40 TABLET, DELAYED RELEASE ORAL at 08:44

## 2020-01-03 RX ADMIN — ASPIRIN 81 MG 81 MG: 81 TABLET ORAL at 08:44

## 2020-01-03 RX ADMIN — MIDODRINE HYDROCHLORIDE 10 MG: 5 TABLET ORAL at 06:02

## 2020-01-03 RX ADMIN — LEVALBUTEROL HYDROCHLORIDE 1.25 MG: 1.25 SOLUTION, CONCENTRATE RESPIRATORY (INHALATION) at 19:10

## 2020-01-03 RX ADMIN — ONDANSETRON 4 MG: 2 INJECTION INTRAMUSCULAR; INTRAVENOUS at 17:30

## 2020-01-03 RX ADMIN — ISODIUM CHLORIDE 3 ML: 0.03 SOLUTION RESPIRATORY (INHALATION) at 08:07

## 2020-01-03 RX ADMIN — ONDANSETRON 4 MG: 2 INJECTION INTRAMUSCULAR; INTRAVENOUS at 11:56

## 2020-01-03 RX ADMIN — SENNOSIDES AND DOCUSATE SODIUM 1 TABLET: 8.6; 5 TABLET ORAL at 08:44

## 2020-01-03 RX ADMIN — LEVALBUTEROL HYDROCHLORIDE 1.25 MG: 1.25 SOLUTION, CONCENTRATE RESPIRATORY (INHALATION) at 08:07

## 2020-01-03 RX ADMIN — GUAIFENESIN 600 MG: 600 TABLET, EXTENDED RELEASE ORAL at 20:59

## 2020-01-03 RX ADMIN — INSULIN LISPRO 1 UNITS: 100 INJECTION, SOLUTION INTRAVENOUS; SUBCUTANEOUS at 06:17

## 2020-01-03 RX ADMIN — MIDODRINE HYDROCHLORIDE 10 MG: 5 TABLET ORAL at 17:29

## 2020-01-03 NOTE — PROGRESS NOTES
Progress Note - Cardiothoracic Surgery   [facility] A Core 70 y o  male MRN: [id number]  Unit/Bed#: [facility] 412-01 Encounter: [id number]      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, Ascending aortic aneurysm     S/P Replacement of ascending aorta with aggressive hemiarch reconstruction and aortic valve resuspension with a 26 mm Dacron graft; POD # 18      24 Hour Events:  Pt had HD yesterday and Permacath placement in IR yesterday  Feels very well today and offers no complaints  Ambulated over 500 ft and did stairs with PT this morning      Medications:   Scheduled Meds:    Current Facility-Administered Medications:  acetaminophen 650 mg Oral Q4H PRN Vonzella Lesch, PA-C   albuterol 2 5 mg Nebulization Q6H PRN Brody Murillo DO   aspirin 81 mg Oral Daily Sumaya Perry PA-C   bisacodyl 10 mg Rectal Daily PRN Vonzella Lesch, PA-C   guaiFENesin 600 mg Oral Q12H [address] Sumaya Perry PA-C   heparin (porcine) 2,000 Units Intravenous After Dialysis Sun Delacruz,    insulin lispro 1-5 Units Subcutaneous TID AC Alex Linder PA-C   insulin lispro 1-5 Units Subcutaneous HS Alex Linder PA-C   iohexol 50 mL Oral 90 min pre-procedure Tamiko Garcia PA-C   levalbuterol 1 25 mg Nebulization BID Brody CatDO nereida   menthol-methyl salicylate  Apply externally 4x Daily PRN Ann-Marie Hdez PA-C   metoclopramide 5 mg Intravenous Q8H PRN Mikayla Weaver PA-C   metoprolol tartrate 12 5 mg Oral Q12H [address] Speedy Farias MD   midodrine 10 mg Oral TID AC Morna Councilman, MD   ondansetron 4 mg Intravenous Q6H Veto De Luna PA-C   oxyCODONE-acetaminophen 1 tablet Oral Q4H PRN Vonzella Lesch, PA-C   oxyCODONE-acetaminophen 2 tablet Oral Q6H PRN Kasandra Linder PA-C   pantoprazole 40 mg Oral Daily Alex Linder PA-C   polyethylene glycol 17 g Oral Daily Kasandra Linder PA-C   senna-docusate sodium 1 tablet Oral BID Mikayla Weaver PA-C   sodium chloride 3 mL Nebulization BID Brody DO Chidi   temazepam 15 mg Oral HS PRN Hilda Redd PA-C     Continuous Infusions:   PRN Meds:   acetaminophen    albuterol    bisacodyl    heparin (porcine)    menthol-methyl salicylate    metoclopramide    oxyCODONE-acetaminophen    oxyCODONE-acetaminophen    temazepam    Vitals:   Vitals:    01/03/20 0342 01/03/20 0638 01/03/20 0807 01/03/20 0829   BP: 107/59   113/68   BP Location: Right arm   Left arm   Pulse: 79   90   Resp: 18   18   Temp: 98 3 °F (36 8 °C)   98 9 °F (37 2 °C)   TempSrc: Oral   Oral   SpO2: 92%  94% 96%   Weight:  81 6 kg (179 lb 14 3 oz)     Height:           Telemetry: Afib;  Heart Rate: 96    Respiratory:   SpO2: SpO2: 96 %; Room air    Intake/Output: +80 ml / 24 hrs  I/O       12/28 0701 - 12/29 0700 12/29 0701 - 12/30 0700 12/30 0701 - 12/31 0700    P  O  720 1050     I V  (mL/kg) 500 (6 3)      Total Intake(mL/kg) 1220 (15 3) 1050 (13)     Urine (mL/kg/hr) 100 (0 1) 450 (0 2)     Emesis/NG output 0      Other 301      Stool 0      Total Output 401 450     Net +819 +600            Unmeasured Urine Occurrence 2 x      Unmeasured Stool Occurrence 3 x      Unmeasured Emesis Occurrence 1 x                Weights:   Weight (last 2 days)     Date/Time   Weight    01/03/20 0638   81 6 (179 9)    01/02/20 0604   81 9 (180 56)    01/01/20 0600   81 5 (179 6)            Admit weight: 83 5 kg    Results:   Results from last 7 days   Lab Units 01/03/20  0444 01/02/20  0434 01/01/20  0553   WBC Thousand/uL 16 54* 18 33* 19 09*   HEMOGLOBIN g/dL 9 3* 8 4* 9 0*   HEMATOCRIT % 30 2* 27 1* 28 5*   PLATELETS Thousands/uL 240 251 216     Results from last 7 days   Lab Units 01/03/20  0444 01/02/20  0434 01/01/20  0553   SODIUM mmol/L 138 140 138   POTASSIUM mmol/L 4 4 4 3 4 4   CHLORIDE mmol/L 103 107 104   CO2 mmol/L 27 25 27   BUN mg/dL 48* 84* 68*   CREATININE mg/dL 4 40* 5 90* 5 91*   CALCIUM mg/dL 8 2* 8 1* 8 0*     Results from last 7 days   Lab Units 01/03/20  0444 01/02/20  0434 01/01/20  0553   INR  1 48* 1 70* 1 90* Coumadin mg 2, 0, 0, 1 1 1 0 0 0       Point of care glucose:  - 159   2 units SSIC / 24 hrs required    Studies:  No studies past 24 hrs      Invasive Lines/Tubes:  Invasive Devices     Peripheral Intravenous Line            Peripheral IV 01/01/20 Right;Ventral (anterior) Wrist 1 day          Hemodialysis Catheter            HD Permanent Double Catheter less than 1 day                Physical Exam:    HEENT/NECK:  PERRLA  No jugular venous distention  Cardiac: Regular rate and rhythm and No murmurs/rubs/gallops  Pulmonary:  Breath sounds clear bilaterally and No rales/rhonchi/wheezes  Abdomen:  Non-tender, Non-distended and Normal bowel sounds  Incisions: Sternal incision and axillary cannulation incision C/D/I  Sternal instability and click noted with cough/deep inspiration  Extremities: Extremities warm/dry, Radial pulses 2+ bilaterally and No edema B/L  Neuro: Alert and oriented X 3, Sensation is grossly intact and No focal deficits  Skin: Warm/Dry, without rashes or lesions  Assessment:  Principal Problem:    Dissection of thoracic aorta (HCC)  Active Problems:    Benign essential hypertension    Abnormal TSH    Benign hypertension with CKD (chronic kidney disease) stage III (HCC)    Benign prostatic hyperplasia without lower urinary tract symptoms    S/P aorta repair    Leukocytosis    Postoperative anemia due to acute blood loss    Anticoagulation goal of INR 2 to 3    AFSHIN (acute kidney injury) ([address] )    Hyperphosphatemia    Hypotension    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, Ascending aortic aneurysm     S/P Replacement of ascending aorta with aggressive hemiarch reconstruction and aortic valve resuspension with a 26 mm Dacron graft; POD # 18    Plan:    1   Cardiac:   NSR; HR/BP well-controlled  Lopressor, 12 5mg PO BID   Midodrine 10mg TID  Continue ASA and Statin therapy  Epicardial pacing wires out  Patient no longer has central IV access   Continue DVT prophylaxis  PAF - Coumadin 2mg tonight  Sternal dissociation - pt asymptomatic  Continue to monitor  2  Pulmonary:    Acute post-op pulmonary insufficiency resolved  Good room air  oxygen saturation  Chest tubes have been discontinued    3  Renal:   Intake/Output net: +80 mL/24 hours  AFSHIN on CKD 3 - Post op Creatinine decreased following HD yesterday  Permacath placed yesterday  Renal following  Scheduled for HD tomorrow  4  Neuro:  Neurologically intact; No active issues  Incisional pain well-controlled; Continue prn Percocet    5  GI:  Tolerating TLC 2 3 gm sodium diet  Maintain 1800 mL daily fluid restriction   Continue stool softeners and prn suppository  Continue GI prophylaxis    6  Endo:   No history of diabetes; Glucose well-controlled with sliding scale coverage    7    Hematology:     Post-operative acute blood loss anemia; Hemoglobin 9 1; trend prn  Leukocytosis -resolved, afebrile  Monitor  ID: WBC downtrending, afebrile, possible aspiration pneumonia vs atelectasis, sputum positive for Serratia, Cefepime course completed  ID signed off yesterday  S/P thoracentesis x 2 - fluid negative and blood culture negative to date    8  Disposition:      Ancitipate discharge home tomorrow following HD treatment      VTE Pharmacologic Prophylaxis: Warfarin (Coumadin)  VTE Mechanical Prophylaxis: sequential compression device    Collaborative rounds completed with Deborah Chen MD  Plan of care discussed with bedside nurse    SIGNATURE: Clare Rivero PA-C  DATE: January 3, 2020  TIME: 9:09 AM

## 2020-01-03 NOTE — PLAN OF CARE
Problem: Potential for Falls  Goal: Patient will remain free of falls  Description  INTERVENTIONS:  - Assess patient frequently for physical needs  -  Identify cognitive and physical deficits and behaviors that affect risk of falls    -  [place]ackney fall precautions as indicated by assessment   - Educate patient/family on patient safety including physical limitations  - Instruct patient to call for assistance with activity based on assessment  - Modify environment to reduce risk of injury  - Consider OT/PT consult to assist with strengthening/mobility  Outcome: Progressing     Problem: PAIN - ADULT  Goal: Verbalizes/displays adequate comfort level or baseline comfort level  Description  Interventions:  - Encourage patient to monitor pain and request assistance  - Assess pain using appropriate pain scale  - Administer analgesics based on type and severity of pain and evaluate response  - Implement non-pharmacological measures as appropriate and evaluate response  - Consider cultural and social influences on pain and pain management  - Notify physician/advanced practitioner if interventions unsuccessful or patient reports new pain  Outcome: Progressing     Problem: INFECTION - ADULT  Goal: Absence or prevention of progression during hospitalization  Description  INTERVENTIONS:  - Assess and monitor for signs and symptoms of infection  - Monitor lab/diagnostic results  - Monitor all insertion sites, i e  indwelling lines, tubes, and drains  - Monitor endotracheal if appropriate and nasal secretions for changes in amount and color  - Brackney appropriate cooling/warming therapies per order  - Administer medications as ordered  - Instruct and encourage patient and family to use good hand hygiene technique  - Identify and instruct in appropriate isolation precautions for identified infection/condition  Outcome: Progressing     Problem: SAFETY ADULT  Goal: Maintain or return to baseline ADL function  Description  INTERVENTIONS:  -  Assess patient's ability to carry out ADLs; assess patient's baseline for ADL function and identify physical deficits which impact ability to perform ADLs (bathing, care of mouth/teeth, toileting, grooming, dressing, etc )  - Assess/evaluate cause of self-care deficits   - Assess range of motion  - Assess patient's mobility; develop plan if impaired  - Assess patient's need for assistive devices and provide as appropriate  - Encourage maximum independence but intervene and supervise when necessary  - Involve family in performance of ADLs  - Assess for home care needs following discharge   - Consider OT consult to assist with ADL evaluation and planning for discharge  - Provide patient education as appropriate  Outcome: Progressing  Goal: Maintain or return mobility status to optimal level  Description  INTERVENTIONS:  - Assess patient's baseline mobility status (ambulation, transfers, stairs, etc )    - Identify cognitive and physical deficits and behaviors that affect mobility  - Identify mobility aids required to assist with transfers and/or ambulation (gait belt, sit-to-stand, lift, walker, cane, etc )  - [place] fall precautions as indicated by assessment  - Record patient progress and toleration of activity level on Mobility SBAR; progress patient to next Phase/Stage  - Instruct patient to call for assistance with activity based on assessment  - Consider rehabilitation consult to assist with strengthening/weightbearing, etc   Outcome: Progressing     Problem: DISCHARGE PLANNING  Goal: Discharge to home or other facility with appropriate resources  Description  INTERVENTIONS:  - Identify barriers to discharge w/patient and caregiver  - Arrange for needed discharge resources and transportation as appropriate  - Identify discharge learning needs (meds, wound care, etc )  - Arrange for interpretive services to assist at discharge as needed  - Refer to Case Management Department for coordinating discharge planning if the patient needs post-hospital services based on physician/advanced practitioner order or complex needs related to functional status, cognitive ability, or social support system  Outcome: Progressing     Problem: Knowledge Deficit  Goal: Patient/family/caregiver demonstrates understanding of disease process, treatment plan, medications, and discharge instructions  Description  Complete learning assessment and assess knowledge base    Interventions:  - Provide teaching at level of understanding  - Provide teaching via preferred learning methods  Outcome: Progressing     Problem: NEUROSENSORY - ADULT  Goal: Achieves stable or improved neurological status  Description  INTERVENTIONS  - Monitor and report changes in neurological status  - Monitor vital signs such as temperature, blood pressure, glucose, and any other labs ordered   - Initiate measures to prevent increased intracranial pressure  - Monitor for seizure activity and implement precautions if appropriate      Outcome: Progressing  Goal: Remains free of injury related to seizures activity  Description  INTERVENTIONS  - Maintain airway, patient safety  and administer oxygen as ordered  - Monitor patient for seizure activity, document and report duration and description of seizure to physician/advanced practitioner  - If seizure occurs,  ensure patient safety during seizure  - Reorient patient post seizure  - Seizure pads on all 4 side rails  - Instruct patient/family to notify RN of any seizure activity including if an aura is experienced  - Instruct patient/family to call for assistance with activity based on nursing assessment  - Administer anti-seizure medications if ordered    Outcome: Progressing  Goal: Achieves maximal functionality and self care  Description  INTERVENTIONS  - Monitor swallowing and airway patency with patient fatigue and changes in neurological status  - Encourage and assist patient to increase activity and self care     - Encourage visually impaired, hearing impaired and aphasic patients to use assistive/communication devices  Outcome: Progressing     Problem: CARDIOVASCULAR - ADULT  Goal: Maintains optimal cardiac output and hemodynamic stability  Description  INTERVENTIONS:  - Monitor I/O, vital signs and rhythm  - Monitor for S/S and trends of decreased cardiac output  - Administer and titrate ordered vasoactive medications to optimize hemodynamic stability  - Assess quality of pulses, skin color and temperature  - Assess for signs of decreased coronary artery perfusion  - Instruct patient to report change in severity of symptoms  Outcome: Progressing  Goal: Absence of cardiac dysrhythmias or at baseline rhythm  Description  INTERVENTIONS:  - Continuous cardiac monitoring, vital signs, obtain 12 lead EKG if ordered  - Administer antiarrhythmic and heart rate control medications as ordered  - Monitor electrolytes and administer replacement therapy as ordered  Outcome: Progressing     Problem: RESPIRATORY - ADULT  Goal: Achieves optimal ventilation and oxygenation  Description  INTERVENTIONS:  - Assess for changes in respiratory status  - Assess for changes in mentation and behavior  - Position to facilitate oxygenation and minimize respiratory effort  - Oxygen administered by appropriate delivery if ordered  - Initiate smoking cessation education as indicated  - Encourage broncho-pulmonary hygiene including cough, deep breathe, Incentive Spirometry  - Assess the need for suctioning and aspirate as needed  - Assess and instruct to report SOB or any respiratory difficulty  - Respiratory Therapy support as indicated  Outcome: Progressing     Problem: Prexisting or High Potential for Compromised Skin Integrity  Goal: Skin integrity is maintained or improved  Description  INTERVENTIONS:  - Identify patients at risk for skin breakdown  - Assess and monitor skin integrity  - Assess and monitor nutrition and hydration status  - Monitor labs   - Assess for incontinence   - Turn and reposition patient  - Assist with mobility/ambulation  - Relieve pressure over bony prominences  - Avoid friction and shearing  - Provide appropriate hygiene as needed including keeping skin clean and dry  - Evaluate need for skin moisturizer/barrier cream  - Collaborate with interdisciplinary team   - Patient/family teaching  - Consider wound care consult   Outcome: Progressing     Problem: METABOLIC, FLUID AND ELECTROLYTES - ADULT  Goal: Electrolytes maintained within normal limits  Description  INTERVENTIONS:  - Monitor labs and assess patient for signs and symptoms of electrolyte imbalances  - Administer electrolyte replacement as ordered  - Monitor response to electrolyte replacements, including repeat lab results as appropriate  - Instruct patient on fluid and nutrition as appropriate  Outcome: Progressing  Goal: Fluid balance maintained  Description  INTERVENTIONS:  - Monitor labs   - Monitor I/O and WT  - Instruct patient on fluid and nutrition as appropriate  - Assess for signs & symptoms of volume excess or deficit  Outcome: Progressing     Problem: Nutrition/Hydration-ADULT  Goal: Nutrient/Hydration intake appropriate for improving, restoring or maintaining nutritional needs  Description  Monitor and assess patient's nutrition/hydration status for malnutrition  Collaborate with interdisciplinary team and initiate plan and interventions as ordered  Monitor patient's weight and dietary intake as ordered or per policy  Utilize nutrition screening tool and intervene as necessary  Determine patient's food preferences and provide high-protein, high-caloric foods as appropriate       INTERVENTIONS:  - Monitor oral intake, urinary output, labs, and treatment plans  - Assess nutrition and hydration status and recommend course of action  - Evaluate amount of meals eaten  - Assist patient with eating if necessary   - Allow adequate time for meals  - Recommend/ encourage appropriate diets, oral nutritional supplements, and vitamin/mineral supplements  - Order, calculate, and assess calorie counts as needed  - Recommend, monitor, and adjust tube feedings and TPN/PPN based on assessed needs  - Assess need for intravenous fluids  - Provide specific nutrition/hydration education as appropriate  - Include patient/family/caregiver in decisions related to nutrition  Outcome: Progressing

## 2020-01-03 NOTE — SOCIAL WORK
Plan home tomorrow with family after HD in am   Has [facility] nsg and PT, has home DME  Family understands pt has HD T-T-S at [facility] on [address]  At 1045  They will transport  IMM signed

## 2020-01-03 NOTE — PROGRESS NOTES
Progress Note - Nephrology   [facility] A Core 70 y o  male MRN: [id number]  Unit/Bed#: [facility] 412-01 Encounter: [id number]      Assessment / Plan:  1  Acute kidney injury likely due to postop ATN as well as contrast nephropathy with CT on admission, HD dependent since 19  Temp HD line in place  Recommend permacath when able  -next HD tomorrow  -stable for d/c after HD tomorrow with close renal f/u outpatient   -renal ultrasound shows right kidney atrophy with small left renal cysts  -I note low C3 with normal C4  Has had proteinuria in the past  Isolated low C3 can be seen in the setting of PIGN  Will defer renal biopsy at this time in light of atrophic right kidney  Hopeful for slow possibly recovery  -UA with microscopy shows trace leukocytes, 2-4 RBCs, 10-20 WBCs with 5-10 hyaline casts  -chronic hepatitis panel negative, urine protein electrophoresis shows possible faint band   -anti-GBM, Anca, AMY panel negative  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, SPEP + for monoclonality  -f/u am BMP  -monitor UOP  2  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 - b/l sCr 1 4-1 8, follows outpatient with Dr Vaughn Bender  3  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  4  Anemia - Hgb 9 3, monitor CBC, transfuse p r n  For hemoglobin less than 7  5  Hyperphosphatemia - last phos 5  monitor phos  6  Hypotension - improved on midodrine, also on metoprolol with hold parameters  7  PNA - s/p cefepime  8  Left pleural effusion s/p thoracentesis ,         Subjective:   He feels well  He has no complaints  Objective:     Vitals: Blood pressure 121/75, pulse 101, temperature 98 1 °F (36 7 °C), temperature source Oral, resp  rate 18, height 5' 9" (1 753 m), weight 81 6 kg (179 lb 14 3 oz), SpO2 93 %  ,Body mass index is 26 57 kg/m²  Temp (24hrs), Av 1 °F (36 7 °C), Min:97 8 °F (36 6 °C), Max:98 9 °F (37 2 °C)      Weight (last 2 days)     Date/Time   Weight 01/03/20 0638   81 6 (179 9)    01/02/20 0604   81 9 (180 56)    01/01/20 0600   81 5 (179 6)                Intake/Output Summary (Last 24 hours) at 1/3/2020 1049  Last data filed at 1/3/2020 6970  Gross per 24 hour   Intake 780 ml   Output 1000 ml   Net -220 ml     I/O last 24 hours: In: 1080 [P O :480; I V :500; Other:100]  Out: 1000 [Other:1000]        Physical Exam:   Physical Exam   Constitutional: He appears well-developed and well-nourished  No distress  thin   HENT:   Head: Normocephalic and atraumatic  Mouth/Throat: No oropharyngeal exudate  Eyes: Right eye exhibits no discharge  Left eye exhibits no discharge  No scleral icterus  Neck: Neck supple  No thyromegaly present  Cardiovascular: Normal rate, regular rhythm and normal heart sounds  Pulmonary/Chest: Effort normal and breath sounds normal  He has no wheezes  He has no rales  Abdominal: Soft  Bowel sounds are normal  He exhibits no distension  There is no tenderness  Musculoskeletal: Normal range of motion  He exhibits no edema  Lymphadenopathy:     He has no cervical adenopathy  Neurological: He is alert  awake   Skin: Skin is warm and dry  No rash noted  He is not diaphoretic  Psychiatric: He has a normal mood and affect  His behavior is normal    Vitals reviewed        Invasive Devices     Peripheral Intravenous Line            Peripheral IV 01/01/20 Right;Ventral (anterior) Wrist 1 day          Hemodialysis Catheter            HD Permanent Double Catheter less than 1 day                Medications:    Scheduled Meds:  Current Facility-Administered Medications:  acetaminophen 650 mg Oral Q4H PRN Regina Crow PA-C   albuterol 2 5 mg Nebulization Q6H PRN Albaretnany Orellana DO   aspirin 81 mg Oral Daily Sumaya Perry PA-C   bisacodyl 10 mg Rectal Daily PRN Floycbritt Crow PA-C   guaiFENesin 600 mg Oral Q12H [address] Sumaya Perry PA-C   heparin (porcine) 2,000 Units Intravenous After Dialysis Sun Vyas DO   insulin lispro 1-5 Units Subcutaneous TID AC Alex NEETU TRINY Linder   insulin lispro 1-5 Units Subcutaneous HS [facility] TRINY Linder   iohexol 50 mL Oral 90 min pre-procedure [facility], TRINY   levalbuterol 1 25 mg Nebulization BID Akanksha Matos, DO   menthol-methyl salicylate  Apply externally 4x Daily PRN Ann-Marie Hdez PA-C   metoclopramide 5 mg Intravenous Q8H PRN Parker Seals Heddie MinusTRINY   metoprolol tartrate 12 5 mg Oral Q12H [address] Rigoberto Baer MD   midodrine 10 mg Oral TID AC Mame Horn MD   ondansetron 4 mg Intravenous Q6H Renea Bearden PA-C   oxyCODONE-acetaminophen 1 tablet Oral Q4H PRN Hilda Redd PA-C   oxyCODONE-acetaminophen 2 tablet Oral Q6H PRN Ez Linder PA-C   pantoprazole 40 mg Oral Daily [facility] TRINY Linder   polyethylene glycol 17 g Oral Daily [facility] TRINY Sullivan   senna-docusate sodium 1 tablet Oral BID Parker Seals LugianTRINY way   sodium chloride 3 mL Nebulization BID Akanksha Matos DO   temazepam 15 mg Oral HS PRN Vera SettleTRINY jane       PRN Meds:   acetaminophen    albuterol    bisacodyl    heparin (porcine)    menthol-methyl salicylate    metoclopramide    oxyCODONE-acetaminophen    oxyCODONE-acetaminophen    temazepam    Continuous Infusions:         LAB RESULTS:      Results from last 7 days   Lab Units 01/03/20  0444 01/02/20  0434 01/01/20  0553 12/31/19  0457 12/30/19  0441 12/29/19  0435 12/28/19  0455   WBC Thousand/uL 16 54* 18 33* 19 09* 21 02* 21 60* 24 57* 26 74*   HEMOGLOBIN g/dL 9 3* 8 4* 9 0* 9 1* 9 7* 9 7* 10 1*   HEMATOCRIT % 30 2* 27 1* 28 5* 28 5* 30 3* 30 2* 31 2*   PLATELETS Thousands/uL 240 251 216 184 281 264 268   LYMPHO PCT % 2*  --   --   --  3* 1*  --    MONO PCT % 8  --   --   --  9 8  --    EOS PCT % 4  --   --   --  2 1  --    POTASSIUM mmol/L 4 4 4 3 4 4 4 8 4 2 4 4 4 3   CHLORIDE mmol/L 103 107 104 104 101 101 96*   CO2 mmol/L 27 25 27 29 26 29 24   BUN mg/dL 48* 84* 68* 59* 82* 67* 100*   CREATININE mg/dL 4 40* 5 90* 5 91* 5 06* 6 46* 5 41* 6 85* CALCIUM mg/dL 8 2* 8 1* 8 0* 7 7* 8 0* 8 1* 8 1*   ALK PHOS U/L  --   --   --   --   --  154*  --    ALT U/L  --   --   --   --   --  21  --    AST U/L  --   --   --   --   --  32  --    MAGNESIUM mg/dL  --   --   --   --   --   --  2 7*   PHOSPHORUS mg/dL 5 0* 5 8*  --  4 8*  --   --  7 1*       CUTURES:  Lab Results   Component Value Date    BLOODCX No Growth After 5 Days  12/26/2019    URINECX 10,000-19,000 cfu/ml  03/08/2018                 Portions of the record may have been created with voice recognition software  Occasional wrong word or "sound a like" substitutions may have occurred due to the inherent limitations of voice recognition software  Read the chart carefully and recognize, using context, where substitutions have occurred  If you have any questions, please contact the dictating provider

## 2020-01-04 ENCOUNTER — APPOINTMENT (INPATIENT)
Dept: DIALYSIS | Facility: HOSPITAL | Age: 80
DRG: 219 | End: 2020-01-04
Attending: INTERNAL MEDICINE
Payer: COMMERCIAL

## 2020-01-04 LAB
ANION GAP SERPL CALCULATED.3IONS-SCNC: 7 MMOL/L (ref 4–13)
BASOPHILS # BLD MANUAL: 0 THOUSAND/UL (ref 0–0.1)
BASOPHILS NFR MAR MANUAL: 0 % (ref 0–1)
BUN SERPL-MCNC: 64 MG/DL (ref 5–25)
CALCIUM SERPL-MCNC: 8.2 MG/DL (ref 8.3–10.1)
CHLORIDE SERPL-SCNC: 103 MMOL/L (ref 100–108)
CO2 SERPL-SCNC: 28 MMOL/L (ref 21–32)
CREAT SERPL-MCNC: 5.25 MG/DL (ref 0.6–1.3)
EOSINOPHIL # BLD MANUAL: 0 THOUSAND/UL (ref 0–0.4)
EOSINOPHIL NFR BLD MANUAL: 0 % (ref 0–6)
ERYTHROCYTE [DISTWIDTH] IN BLOOD BY AUTOMATED COUNT: 15.6 % (ref 11.6–15.1)
GFR SERPL CREATININE-BSD FRML MDRD: 10 ML/MIN/1.73SQ M
GLUCOSE SERPL-MCNC: 103 MG/DL (ref 65–140)
GLUCOSE SERPL-MCNC: 104 MG/DL (ref 65–140)
GLUCOSE SERPL-MCNC: 130 MG/DL (ref 65–140)
GLUCOSE SERPL-MCNC: 132 MG/DL (ref 65–140)
GLUCOSE SERPL-MCNC: 186 MG/DL (ref 65–140)
HCT VFR BLD AUTO: 26.6 % (ref 36.5–49.3)
HGB BLD-MCNC: 8.4 G/DL (ref 12–17)
INR PPP: 1.56 (ref 0.84–1.19)
LYMPHOCYTES # BLD AUTO: 0.56 THOUSAND/UL (ref 0.6–4.47)
LYMPHOCYTES # BLD AUTO: 4 % (ref 14–44)
MCH RBC QN AUTO: 30.5 PG (ref 26.8–34.3)
MCHC RBC AUTO-ENTMCNC: 31.6 G/DL (ref 31.4–37.4)
MCV RBC AUTO: 97 FL (ref 82–98)
METAMYELOCYTES NFR BLD MANUAL: 1 % (ref 0–1)
MONOCYTES # BLD AUTO: 1.69 THOUSAND/UL (ref 0–1.22)
MONOCYTES NFR BLD: 12 % (ref 4–12)
NEUTROPHILS # BLD MANUAL: 11.67 THOUSAND/UL (ref 1.85–7.62)
NEUTS BAND NFR BLD MANUAL: 1 % (ref 0–8)
NEUTS SEG NFR BLD AUTO: 82 % (ref 43–75)
NRBC BLD AUTO-RTO: 0 /100 WBCS
PLATELET # BLD AUTO: 235 THOUSANDS/UL (ref 149–390)
PLATELET BLD QL SMEAR: ADEQUATE
PMV BLD AUTO: 9.9 FL (ref 8.9–12.7)
POLYCHROMASIA BLD QL SMEAR: PRESENT
POTASSIUM SERPL-SCNC: 5.1 MMOL/L (ref 3.5–5.3)
PROTHROMBIN TIME: 18.2 SECONDS (ref 11.6–14.5)
RBC # BLD AUTO: 2.75 MILLION/UL (ref 3.88–5.62)
RBC MORPH BLD: PRESENT
SODIUM SERPL-SCNC: 138 MMOL/L (ref 136–145)
WBC # BLD AUTO: 14.06 THOUSAND/UL (ref 4.31–10.16)

## 2020-01-04 PROCEDURE — 80048 BASIC METABOLIC PNL TOTAL CA: CPT | Performed by: INTERNAL MEDICINE

## 2020-01-04 PROCEDURE — 82948 REAGENT STRIP/BLOOD GLUCOSE: CPT

## 2020-01-04 PROCEDURE — 94640 AIRWAY INHALATION TREATMENT: CPT

## 2020-01-04 PROCEDURE — 94760 N-INVAS EAR/PLS OXIMETRY 1: CPT

## 2020-01-04 PROCEDURE — 99024 POSTOP FOLLOW-UP VISIT: CPT | Performed by: PHYSICIAN ASSISTANT

## 2020-01-04 PROCEDURE — 99233 SBSQ HOSP IP/OBS HIGH 50: CPT | Performed by: INTERNAL MEDICINE

## 2020-01-04 PROCEDURE — 85610 PROTHROMBIN TIME: CPT | Performed by: PHYSICIAN ASSISTANT

## 2020-01-04 PROCEDURE — 85027 COMPLETE CBC AUTOMATED: CPT | Performed by: INTERNAL MEDICINE

## 2020-01-04 PROCEDURE — 85007 BL SMEAR W/DIFF WBC COUNT: CPT | Performed by: INTERNAL MEDICINE

## 2020-01-04 RX ORDER — WARFARIN SODIUM 1 MG/1
2 TABLET ORAL
Status: COMPLETED | OUTPATIENT
Start: 2020-01-04 | End: 2020-01-04

## 2020-01-04 RX ADMIN — LEVALBUTEROL HYDROCHLORIDE 1.25 MG: 1.25 SOLUTION, CONCENTRATE RESPIRATORY (INHALATION) at 19:22

## 2020-01-04 RX ADMIN — ALTEPLASE 2 MG: 2.2 INJECTION, POWDER, LYOPHILIZED, FOR SOLUTION INTRAVENOUS at 10:05

## 2020-01-04 RX ADMIN — ASPIRIN 81 MG 81 MG: 81 TABLET ORAL at 11:46

## 2020-01-04 RX ADMIN — SENNOSIDES AND DOCUSATE SODIUM 1 TABLET: 8.6; 5 TABLET ORAL at 11:47

## 2020-01-04 RX ADMIN — GUAIFENESIN 600 MG: 600 TABLET, EXTENDED RELEASE ORAL at 22:40

## 2020-01-04 RX ADMIN — SENNOSIDES AND DOCUSATE SODIUM 1 TABLET: 8.6; 5 TABLET ORAL at 17:36

## 2020-01-04 RX ADMIN — HEPARIN SODIUM 2000 UNITS: 1000 INJECTION INTRAVENOUS; SUBCUTANEOUS at 09:08

## 2020-01-04 RX ADMIN — INSULIN LISPRO 1 UNITS: 100 INJECTION, SOLUTION INTRAVENOUS; SUBCUTANEOUS at 16:43

## 2020-01-04 RX ADMIN — ISODIUM CHLORIDE 3 ML: 0.03 SOLUTION RESPIRATORY (INHALATION) at 19:22

## 2020-01-04 RX ADMIN — ONDANSETRON 4 MG: 2 INJECTION INTRAMUSCULAR; INTRAVENOUS at 00:42

## 2020-01-04 RX ADMIN — MIDODRINE HYDROCHLORIDE 10 MG: 5 TABLET ORAL at 06:04

## 2020-01-04 RX ADMIN — ONDANSETRON 4 MG: 2 INJECTION INTRAMUSCULAR; INTRAVENOUS at 11:46

## 2020-01-04 RX ADMIN — PANTOPRAZOLE SODIUM 40 MG: 40 TABLET, DELAYED RELEASE ORAL at 11:46

## 2020-01-04 RX ADMIN — MIDODRINE HYDROCHLORIDE 10 MG: 5 TABLET ORAL at 16:43

## 2020-01-04 RX ADMIN — MIDODRINE HYDROCHLORIDE 10 MG: 5 TABLET ORAL at 11:46

## 2020-01-04 RX ADMIN — GUAIFENESIN 600 MG: 600 TABLET, EXTENDED RELEASE ORAL at 11:46

## 2020-01-04 RX ADMIN — ONDANSETRON 4 MG: 2 INJECTION INTRAMUSCULAR; INTRAVENOUS at 05:46

## 2020-01-04 RX ADMIN — OXYCODONE HYDROCHLORIDE AND ACETAMINOPHEN 1 TABLET: 5; 325 TABLET ORAL at 05:45

## 2020-01-04 RX ADMIN — METOPROLOL TARTRATE 12.5 MG: 25 TABLET ORAL at 22:40

## 2020-01-04 RX ADMIN — TEMAZEPAM 15 MG: 15 CAPSULE ORAL at 02:12

## 2020-01-04 RX ADMIN — ONDANSETRON 4 MG: 2 INJECTION INTRAMUSCULAR; INTRAVENOUS at 17:36

## 2020-01-04 RX ADMIN — WARFARIN SODIUM 2 MG: 1 TABLET ORAL at 17:36

## 2020-01-04 RX ADMIN — METOPROLOL TARTRATE 12.5 MG: 25 TABLET ORAL at 11:46

## 2020-01-04 NOTE — PLAN OF CARE
Problem: METABOLIC, FLUID AND ELECTROLYTES - ADULT  Goal: Electrolytes maintained within normal limits  Description  INTERVENTIONS:  - Monitor labs and assess patient for signs and symptoms of electrolyte imbalances  - Administer electrolyte replacement as ordered  - Monitor response to electrolyte replacements, including repeat lab results as appropriate  - Instruct patient on fluid and nutrition as appropriate  Outcome: Progressing     Problem: METABOLIC, FLUID AND ELECTROLYTES - ADULT  Goal: Fluid balance maintained  Description  INTERVENTIONS:  - Monitor labs   - Monitor I/O and WT  - Instruct patient on fluid and nutrition as appropriate  - Assess for signs & symptoms of volume excess or deficit  Outcome: Progressing     Patient receiving 3 5 hour HD treatment  UF goal 0 5L as tolerated  Plan of care discussed with patient

## 2020-01-04 NOTE — HEMODIALYSIS
Pt stable on HD tx  However half way through tx venous pressure and TMP started alarming  System clotted  Unable to return blood to pt  Went to flush both ports-venous port completed clotted  Activase ordered per Dr Shelbie Santiago  Distilled 30 min with no resolution  Dr Morfin Shock aware  D/C tx  Eval for Monday

## 2020-01-04 NOTE — PROGRESS NOTES
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, Ascending aortic aneurysm     S/P Replacement of ascending aorta with aggressive hemiarch reconstruction and aortic valve resuspension with a 26 mm Dacron graft; POD # 19      24 Hour Events:  Pt seen and evaluated in HD today  Pt had high venous pressures during treatment and permacath now clotted  Only able to receive partial HD treatment today  Pt will require IR consultation  Pt feels well today and offers no complaints, other than disappointment he cannot be discharged today      Medications:   Scheduled Meds:    Current Facility-Administered Medications:  acetaminophen 650 mg Oral Q4H PRN Tayler Mccann PA-C   albuterol 2 5 mg Nebulization Q6H PRN Graciela Nickerson, DO   aspirin 81 mg Oral Daily Sumaya Perry PA-C   bisacodyl 10 mg Rectal Daily PRN Tayler Mccann PA-C   guaiFENesin 600 mg Oral Q12H [facility] Sumaya Perry PA-C   heparin (porcine) 2,000 Units Intravenous After Dialysis Sun Yuen, DO   insulin lispro 1-5 Units Subcutaneous TID  Alex Linder PA-C   insulin lispro 1-5 Units Subcutaneous  Alex Linder PA-C   iohexol 50 mL Oral 90 min pre-procedure Talon Srivastava PA-C   levalbuterol 1 25 mg Nebulization BID Graciela Nickerson, DO   menthol-methyl salicylate  Apply externally 4x Daily PRN Ann-Marie Hdez PA-C   metoclopramide 5 mg Intravenous Q8H PRN Deuce Weaver PA-C   metoprolol tartrate 12 5 mg Oral Q12H [facility] Shawna Hawthorne MD   midodrine 10 mg Oral TID AC Tyshawn Perez MD   ondansetron 4 mg Intravenous Q6H Anderson Longoria PA-C   oxyCODONE-acetaminophen 1 tablet Oral Q4H PRN Tayler Mccann PA-C   oxyCODONE-acetaminophen 2 tablet Oral Q6H PRN Ever Linder PA-C   pantoprazole 40 mg Oral Daily Alex Linder PA-C   polyethylene glycol 17 g Oral Daily Alex M TRINY Linder-docusate sodium 1 tablet Oral BID Roman Collet Oakley Manners, PA-C   sodium chloride 3 mL Nebulization BID Chencho Mclean DO   temazepam 15 mg Oral HS PRN Eugene Valentine PA-C     Continuous Infusions:   PRN Meds:   acetaminophen    albuterol    bisacodyl    heparin (porcine)    menthol-methyl salicylate    metoclopramide    oxyCODONE-acetaminophen    oxyCODONE-acetaminophen    temazepam    Vitals:   Vitals:    01/04/20 0800 01/04/20 0805 01/04/20 0900 01/04/20 0930   BP: 119/68 105/61 105/58 105/70   BP Location: Left arm Left arm     Pulse: 76 81 68 68   Resp: 16 16 16 16   Temp: 98 °F (36 7 °C)   98 1 °F (36 7 °C)   TempSrc: Oral      SpO2:       Weight:       Height:           Telemetry: Afib;  Heart Rate: 96    Respiratory:   SpO2: SpO2: 94 %; 2L NC    Intake/Output: -275 ml / 24 hrs  I/O       12/28 0701 - 12/29 0700 12/29 0701 - 12/30 0700 12/30 0701 - 12/31 0700    P  O  720 1050     I V  (mL/kg) 500 (6 3)      Total Intake(mL/kg) 1220 (15 3) 1050 (13)     Urine (mL/kg/hr) 100 (0 1) 450 (0 2)     Emesis/NG output 0      Other 301      Stool 0      Total Output 401 450     Net +819 +600            Unmeasured Urine Occurrence 2 x      Unmeasured Stool Occurrence 3 x      Unmeasured Emesis Occurrence 1 x                Weights:   Weight (last 2 days)     Date/Time   Weight    01/04/20 0600   81 5 (179 68)    01/03/20 0638   81 6 (179 9)    01/02/20 0604   81 9 (180 56)            Admit weight: 83 5 kg    Results:   Results from last 7 days   Lab Units 01/04/20  0557 01/03/20  0444 01/02/20  0434   WBC Thousand/uL 14 06* 16 54* 18 33*   HEMOGLOBIN g/dL 8 4* 9 3* 8 4*   HEMATOCRIT % 26 6* 30 2* 27 1*   PLATELETS Thousands/uL 235 240 251     Results from last 7 days   Lab Units 01/04/20  0557 01/03/20  0444 01/02/20  0434   SODIUM mmol/L 138 138 140   POTASSIUM mmol/L 5 1 4 4 4 3   CHLORIDE mmol/L 103 103 107   CO2 mmol/L 28 27 25   BUN mg/dL 64* 48* 84*   CREATININE mg/dL 5 25* 4 40* 5 90*   CALCIUM mg/dL 8 2* 8 2* 8 1*     Results from last 7 days   Lab Units 01/03/20  0444 01/02/20  0434 01/01/20  0553   INR  1 48* 1 70* 1 90*       Coumadin mg 2, 0, 0, 1 1 1 0 0 0       Point of care glucose:  - 132   No SSIC / 24 hrs required    Studies:  No studies past 24 hrs      Invasive Lines/Tubes:  Invasive Devices     Peripheral Intravenous Line            Peripheral IV 01/01/20 Right;Ventral (anterior) Wrist 2 days          Hemodialysis Catheter            HD Permanent Double Catheter 1 day                Physical Exam:    HEENT/NECK:  PERRLA  No jugular venous distention  Cardiac: Regular rate and rhythm and No murmurs/rubs/gallops  Pulmonary:  Breath sounds clear bilaterally and No rales/rhonchi/wheezes  Abdomen:  Non-tender, Non-distended and Normal bowel sounds  Incisions: Sternal incision and axiilary cannulation incisions C/D/I  Sternal instability of lower portion of sternum noted with deep inspiration  Extremities: Extremities warm/dry, Radial pulses 2+ bilaterally and No edema B/L  Neuro: Alert and oriented X 3, Sensation is grossly intact and No focal deficits  Skin: Warm/Dry, without rashes or lesions  Assessment:  Principal Problem:    Dissection of thoracic aorta (HCC)  Active Problems:    Benign essential hypertension    Abnormal TSH    Benign hypertension with CKD (chronic kidney disease) stage III (HCC)    Benign prostatic hyperplasia without lower urinary tract symptoms    S/P aorta repair    Postoperative anemia due to acute blood loss    Anticoagulation goal of INR 2 to 3    AFSHIN (acute kidney injury) ([facility] [address] )    Hyperphosphatemia    Hypotension    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, Ascending aortic aneurysm     S/P Replacement of ascending aorta with aggressive hemiarch reconstruction and aortic valve resuspension with a 26 mm Dacron graft; POD # 19    Plan:    1   Cardiac:   NSR; HR/BP well-controlled  Lopressor, 12 5mg PO BID   Midodrine 10mg TID  Continue ASA and Statin therapy  Epicardial pacing wires out  Patient no longer has central IV access   Continue DVT prophylaxis  PAF - Coumadin 2mg tonight  Sternal dissociation - pt asymptomatic  Continue to monitor  2  Pulmonary:    Acute post-op pulmonary insufficiency resolved  Good room air oxygen saturation  Chest tubes have been discontinued    3  Renal:   Intake/Output net: -275 mL/24 hours  AFSHIN on CKD 3 - Partial HD treatment today due to Permacath clot/malfunction  Will required IR evaluation on Monday  Renal following  4  Neuro:  Neurologically intact; No active issues  Incisional pain well-controlled; Continue prn Percocet    5  GI:  Tolerating TLC 2 3 gm sodium diet  Maintain 1800 mL daily fluid restriction   Continue stool softeners and prn suppository  Continue GI prophylaxis    6  Endo:   No history of diabetes; Glucose well-controlled with sliding scale coverage    7    Hematology:     Post-operative acute blood loss anemia; Hemoglobin 8 4; trend prn  Leukocytosis -stable, afebrile  Monitor  ID: WBC downtrending, afebrile, possible aspiration pneumonia vs atelectasis, sputum positive for Serratia, Cefepime course completed  ID signed off yesterday  S/P thoracentesis x 2 - fluid negative and blood culture negative to date    8  Disposition:      Discharge home today cancelled due to permacath clot  Will require hospitalization through at least Monday for IR intervention       VTE Pharmacologic Prophylaxis: Warfarin (Coumadin)  VTE Mechanical Prophylaxis: sequential compression device    Collaborative rounds completed with Nolan Banks MD  Plan of care discussed with bedside nurse    SIGNATURE: Garrick Funez PA-C  DATE: January 4, 2020  TIME: 11:10 AM

## 2020-01-04 NOTE — PROGRESS NOTES
Progress Note - Nephrology   [facility] A Core 70 y o  male MRN: [id number]  Unit/Bed#: [facility] 412-01 Encounter: [id number]      Assessment / Plan:  1  Acute kidney injury likely due to postop ATN as well as contrast nephropathy with CT on admission, HD dependent since 12/23/19  Temp HD line changed to permacath on 1/2/20  unfortunately high venous pressures with HD catheter and now clotted  Alteplase cannot be withdrawn    -not yet stable for D/c d/t catheter malfunction  Will consult IR  NPO after MN 1/6/20    -renal ultrasound shows right kidney atrophy with small left renal cysts  -I note low C3 with normal C4  Has had proteinuria in the past  Isolated low C3 can be seen in the setting of PIGN  Will defer renal biopsy at this time in light of atrophic right kidney  Hopeful for slow possibly recovery    -UA with microscopy shows trace leukocytes, 2-4 RBCs, 10-20 WBCs with 5-10 hyaline casts  -chronic hepatitis panel negative, urine protein electrophoresis shows possible faint band   -anti-GBM, Anca, AMY panel negative  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, SPEP + for monoclonality  -f/u am BMP  -monitor UOP  2  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 - b/l sCr 1 4-1 8, follows outpatient with Dr Cari Burkett  3  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  4  Anemia - Hgb 9 3-->8 4, monitor CBC, transfuse p r n  For hemoglobin less than 7  5  Hyperphosphatemia - last phos 5  monitor phos  6  Hypotension - improved on midodrine, also on metoprolol with hold parameters  7  PNA - s/p cefepime  8  Left pleural effusion s/p thoracentesis 12/24, 12/27        Subjective: Only received 1 5hr HD after which venous pressure nina and catheter clotted  Alteplase ineffective  Objective:     Vitals: Blood pressure 105/58, pulse 68, temperature 98 °F (36 7 °C), temperature source Oral, resp   rate 16, height 5' 9" (1 753 m), weight 81 5 kg (179 lb 10 8 oz), SpO2 94 % ,Body mass index is 26 53 kg/m²  Temp (24hrs), Av °F (36 7 °C), Min:97 3 °F (36 3 °C), Max:98 4 °F (36 9 °C)      Weight (last 2 days)     Date/Time   Weight    20 0600   81 5 (179 68)    20 0638   81 6 (179 9)    20 0604   81 9 (180 56)                Intake/Output Summary (Last 24 hours) at 2020 1039  Last data filed at 2020 0800  Gross per 24 hour   Intake 425 ml   Output 500 ml   Net -75 ml     I/O last 24 hours: In: 425 [P O :225; I V :200]  Out: 500 [Urine:500]        Physical Exam:   Physical Exam   Constitutional: He appears well-developed and well-nourished  No distress  HENT:   Head: Normocephalic and atraumatic  Mask over face   Eyes: Right eye exhibits no discharge  Left eye exhibits no discharge  No scleral icterus  Neck: Neck supple  No thyromegaly present  Cardiovascular: Normal rate, regular rhythm and normal heart sounds  Pulmonary/Chest: Effort normal and breath sounds normal  He has no wheezes  He has no rales  Abdominal: Soft  Bowel sounds are normal  He exhibits no distension  There is no tenderness  Musculoskeletal: Normal range of motion  He exhibits no edema  Neurological: He is alert  awake   Skin: Skin is warm and dry  No rash noted  He is not diaphoretic  Psychiatric: He has a normal mood and affect  His behavior is normal    Vitals reviewed        Invasive Devices     Peripheral Intravenous Line            Peripheral IV 20 Right;Ventral (anterior) Wrist 2 days          Hemodialysis Catheter            HD Permanent Double Catheter 1 day                Medications:    Scheduled Meds:  Current Facility-Administered Medications:  acetaminophen 650 mg Oral Q4H PRN Yolette Chacon PA-C   albuterol 2 5 mg Nebulization Q6H PRN Sherry Music, DO   aspirin 81 mg Oral Daily Sumaya Perry PA-C   bisacodyl 10 mg Rectal Daily PRN Yolette Chacon PA-C   guaiFENesin 600 mg Oral Q12H [facility] Sumaya Perry PA-C   heparin (porcine) 2,000 Units Intravenous After Dialysis [place],    insulin lispro 1-5 Units Subcutaneous TID AC Alex DE ANDA TRINY Linder   insulin lispro 1-5 Units Subcutaneous HS Alex DE ANDA TRINY Linder   iohexol 50 mL Oral 90 min pre-procedure Sharmila Chong PA-C   levalbuterol 1 25 mg Nebulization BID Adrianovivek Sweets, DO   menthol-methyl salicylate  Apply externally 4x Daily PRN Ann-Marie Hdez PA-C   metoclopramide 5 mg Intravenous Q8H PRN Ronel Elizonod PA-C   metoprolol tartrate 12 5 mg Oral Q12H [facility] Zack Dean MD   midodrine 10 mg Oral TID AC Marie Soto MD   ondansetron 4 mg Intravenous Q6H Jackelin Araujo PA-C   oxyCODONE-acetaminophen 1 tablet Oral Q4H PRN Pete TRINY Fisher   oxyCODONE-acetaminophen 2 tablet Oral Q6H PRN Argenis Wetzel PA-C   pantoprazole 40 mg Oral Daily Alex DE ANDA TRINY Linder   polyethylene glycol 17 g Oral Daily Alex NEETU TRINY Sullivan   senna-docusate sodium 1 tablet Oral BID Ronel Weaver PA-C   sodium chloride 3 mL Nebulization BID Adriano France,    temazepam 15 mg Oral HS PRN Pete GaTRINY kitchen       PRN Meds:   acetaminophen    albuterol    bisacodyl    heparin (porcine)    menthol-methyl salicylate    metoclopramide    oxyCODONE-acetaminophen    oxyCODONE-acetaminophen    temazepam    Continuous Infusions:         LAB RESULTS:      Results from last 7 days   Lab Units 01/04/20  0557 01/03/20  0444 01/02/20  0434 01/01/20  0553 12/31/19  0457 12/30/19  0441 12/29/19  0435   WBC Thousand/uL 14 06* 16 54* 18 33* 19 09* 21 02* 21 60* 24 57*   HEMOGLOBIN g/dL 8 4* 9 3* 8 4* 9 0* 9 1* 9 7* 9 7*   HEMATOCRIT % 26 6* 30 2* 27 1* 28 5* 28 5* 30 3* 30 2*   PLATELETS Thousands/uL 235 240 251 216 184 281 264   LYMPHO PCT % 4* 2*  --   --   --  3* 1*   MONO PCT % 12 8  --   --   --  9 8   EOS PCT % 0 4  --   --   --  2 1   POTASSIUM mmol/L 5 1 4 4 4 3 4 4 4 8 4 2 4 4   CHLORIDE mmol/L 103 103 107 104 104 101 101   CO2 mmol/L 28 27 25 27 29 26 29   BUN mg/dL 64* 48* 84* 68* 59* 82* 67*   CREATININE mg/dL 5 25* 4 40* 5 90* 5 91* 5 06* 6 46* 5 41*   CALCIUM mg/dL 8 2* 8 2* 8 1* 8 0* 7 7* 8 0* 8 1*   ALK PHOS U/L  --   --   --   --   --   --  154*   ALT U/L  --   --   --   --   --   --  21   AST U/L  --   --   --   --   --   --  32   PHOSPHORUS mg/dL  --  5 0* 5 8*  --  4 8*  --   --        CUTURES:  Lab Results   Component Value Date    BLOODCX No Growth After 5 Days  12/26/2019    URINECX 10,000-19,000 cfu/ml  03/08/2018                 Portions of the record may have been created with voice recognition software  Occasional wrong word or "sound a like" substitutions may have occurred due to the inherent limitations of voice recognition software  Read the chart carefully and recognize, using context, where substitutions have occurred  If you have any questions, please contact the dictating provider

## 2020-01-05 PROBLEM — T82.9XXA COMPLICATION OF VASCULAR DIALYSIS CATHETER: Status: ACTIVE | Noted: 2020-01-05

## 2020-01-05 LAB
ANION GAP SERPL CALCULATED.3IONS-SCNC: 6 MMOL/L (ref 4–13)
BASOPHILS # BLD AUTO: 0.03 THOUSANDS/ΜL (ref 0–0.1)
BASOPHILS NFR BLD AUTO: 0 % (ref 0–1)
BUN SERPL-MCNC: 65 MG/DL (ref 5–25)
CALCIUM SERPL-MCNC: 7.9 MG/DL (ref 8.3–10.1)
CHLORIDE SERPL-SCNC: 104 MMOL/L (ref 100–108)
CO2 SERPL-SCNC: 27 MMOL/L (ref 21–32)
CREAT SERPL-MCNC: 5.3 MG/DL (ref 0.6–1.3)
EOSINOPHIL # BLD AUTO: 0.13 THOUSAND/ΜL (ref 0–0.61)
EOSINOPHIL NFR BLD AUTO: 1 % (ref 0–6)
ERYTHROCYTE [DISTWIDTH] IN BLOOD BY AUTOMATED COUNT: 15.5 % (ref 11.6–15.1)
GFR SERPL CREATININE-BSD FRML MDRD: 10 ML/MIN/1.73SQ M
GLUCOSE SERPL-MCNC: 101 MG/DL (ref 65–140)
GLUCOSE SERPL-MCNC: 102 MG/DL (ref 65–140)
GLUCOSE SERPL-MCNC: 104 MG/DL (ref 65–140)
GLUCOSE SERPL-MCNC: 132 MG/DL (ref 65–140)
GLUCOSE SERPL-MCNC: 81 MG/DL (ref 65–140)
HCT VFR BLD AUTO: 25.1 % (ref 36.5–49.3)
HGB BLD-MCNC: 7.7 G/DL (ref 12–17)
IMM GRANULOCYTES # BLD AUTO: 0.26 THOUSAND/UL (ref 0–0.2)
IMM GRANULOCYTES NFR BLD AUTO: 2 % (ref 0–2)
INR PPP: 1.54 (ref 0.84–1.19)
LYMPHOCYTES # BLD AUTO: 0.88 THOUSANDS/ΜL (ref 0.6–4.47)
LYMPHOCYTES NFR BLD AUTO: 7 % (ref 14–44)
MCH RBC QN AUTO: 30.2 PG (ref 26.8–34.3)
MCHC RBC AUTO-ENTMCNC: 30.7 G/DL (ref 31.4–37.4)
MCV RBC AUTO: 98 FL (ref 82–98)
MONOCYTES # BLD AUTO: 1.73 THOUSAND/ΜL (ref 0.17–1.22)
MONOCYTES NFR BLD AUTO: 14 % (ref 4–12)
NEUTROPHILS # BLD AUTO: 9.38 THOUSANDS/ΜL (ref 1.85–7.62)
NEUTS SEG NFR BLD AUTO: 76 % (ref 43–75)
NRBC BLD AUTO-RTO: 0 /100 WBCS
PLATELET # BLD AUTO: 229 THOUSANDS/UL (ref 149–390)
PMV BLD AUTO: 10.1 FL (ref 8.9–12.7)
POTASSIUM SERPL-SCNC: 5.2 MMOL/L (ref 3.5–5.3)
PROTHROMBIN TIME: 18 SECONDS (ref 11.6–14.5)
RBC # BLD AUTO: 2.55 MILLION/UL (ref 3.88–5.62)
SODIUM SERPL-SCNC: 137 MMOL/L (ref 136–145)
WBC # BLD AUTO: 12.41 THOUSAND/UL (ref 4.31–10.16)

## 2020-01-05 PROCEDURE — 94760 N-INVAS EAR/PLS OXIMETRY 1: CPT

## 2020-01-05 PROCEDURE — 80048 BASIC METABOLIC PNL TOTAL CA: CPT | Performed by: INTERNAL MEDICINE

## 2020-01-05 PROCEDURE — 85025 COMPLETE CBC W/AUTO DIFF WBC: CPT | Performed by: INTERNAL MEDICINE

## 2020-01-05 PROCEDURE — 94640 AIRWAY INHALATION TREATMENT: CPT

## 2020-01-05 PROCEDURE — 99232 SBSQ HOSP IP/OBS MODERATE 35: CPT | Performed by: INTERNAL MEDICINE

## 2020-01-05 PROCEDURE — 99024 POSTOP FOLLOW-UP VISIT: CPT | Performed by: PHYSICIAN ASSISTANT

## 2020-01-05 PROCEDURE — 82948 REAGENT STRIP/BLOOD GLUCOSE: CPT

## 2020-01-05 PROCEDURE — 85610 PROTHROMBIN TIME: CPT | Performed by: PHYSICIAN ASSISTANT

## 2020-01-05 RX ORDER — WARFARIN SODIUM 1 MG/1
1 TABLET ORAL
Status: COMPLETED | OUTPATIENT
Start: 2020-01-05 | End: 2020-01-05

## 2020-01-05 RX ADMIN — ONDANSETRON 4 MG: 2 INJECTION INTRAMUSCULAR; INTRAVENOUS at 17:31

## 2020-01-05 RX ADMIN — MIDODRINE HYDROCHLORIDE 10 MG: 5 TABLET ORAL at 16:16

## 2020-01-05 RX ADMIN — ACETAMINOPHEN 650 MG: 325 TABLET ORAL at 02:29

## 2020-01-05 RX ADMIN — MIDODRINE HYDROCHLORIDE 10 MG: 5 TABLET ORAL at 06:07

## 2020-01-05 RX ADMIN — METOPROLOL TARTRATE 12.5 MG: 25 TABLET ORAL at 08:58

## 2020-01-05 RX ADMIN — METOPROLOL TARTRATE 12.5 MG: 25 TABLET ORAL at 21:37

## 2020-01-05 RX ADMIN — WARFARIN SODIUM 1 MG: 1 TABLET ORAL at 17:28

## 2020-01-05 RX ADMIN — SENNOSIDES AND DOCUSATE SODIUM 1 TABLET: 8.6; 5 TABLET ORAL at 17:28

## 2020-01-05 RX ADMIN — GUAIFENESIN 600 MG: 600 TABLET, EXTENDED RELEASE ORAL at 08:58

## 2020-01-05 RX ADMIN — SENNOSIDES AND DOCUSATE SODIUM 1 TABLET: 8.6; 5 TABLET ORAL at 08:58

## 2020-01-05 RX ADMIN — LEVALBUTEROL HYDROCHLORIDE 1.25 MG: 1.25 SOLUTION, CONCENTRATE RESPIRATORY (INHALATION) at 20:32

## 2020-01-05 RX ADMIN — PANTOPRAZOLE SODIUM 40 MG: 40 TABLET, DELAYED RELEASE ORAL at 08:58

## 2020-01-05 RX ADMIN — ASPIRIN 81 MG 81 MG: 81 TABLET ORAL at 08:58

## 2020-01-05 RX ADMIN — MIDODRINE HYDROCHLORIDE 10 MG: 5 TABLET ORAL at 11:40

## 2020-01-05 RX ADMIN — ISODIUM CHLORIDE 3 ML: 0.03 SOLUTION RESPIRATORY (INHALATION) at 20:32

## 2020-01-05 RX ADMIN — ISODIUM CHLORIDE 3 ML: 0.03 SOLUTION RESPIRATORY (INHALATION) at 07:27

## 2020-01-05 RX ADMIN — GUAIFENESIN 600 MG: 600 TABLET, EXTENDED RELEASE ORAL at 21:37

## 2020-01-05 RX ADMIN — ONDANSETRON 4 MG: 2 INJECTION INTRAMUSCULAR; INTRAVENOUS at 06:07

## 2020-01-05 RX ADMIN — LEVALBUTEROL HYDROCHLORIDE 1.25 MG: 1.25 SOLUTION, CONCENTRATE RESPIRATORY (INHALATION) at 07:27

## 2020-01-05 RX ADMIN — ONDANSETRON 4 MG: 2 INJECTION INTRAMUSCULAR; INTRAVENOUS at 11:39

## 2020-01-05 NOTE — PROGRESS NOTES
Progress Note - Nephrology   [facility] A Core 70 y o  male MRN: [id number]  Unit/Bed#: [facility] 412-01 Encounter: [id number]      Assessment / Plan:  1  Acute kidney injury likely due to postop ATN as well as contrast nephropathy with CT on admission, HD dependent since 12/23/19  Temp HD line changed to permacath on 1/2/20  unfortunately high venous pressures with HD catheter and clotted on 1/4/20  Alteplase could not be withdrawn    -not yet stable for D/c d/t catheter malfunction  Will consult IR  NPO after midnight 1/6/20  for cathetergram 1/6/20    -renal ultrasound shows right kidney atrophy with small left renal cysts  -I note low C3 with normal C4  Has had proteinuria in the past  Isolated low C3 can be seen in the setting of PIGN  Will defer renal biopsy at this time in light of atrophic right kidney   Hopeful for slow possibly recovery    -UA with microscopy shows trace leukocytes, 2-4 RBCs, 10-20 WBCs with 5-10 hyaline casts  -chronic hepatitis panel negative, urine protein electrophoresis shows possible faint band   -anti-GBM, Anca, AMY panel negative  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, SPEP + for monoclonality  -f/u am BMP  -monitor UOP  2  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 - b/l sCr 1 4-1 8, follows outpatient with Dr Philly Armas  3  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  4  Anemia - Hgb 9 3-->8 4-->7 7, monitor CBC, transfuse p r n  For hemoglobin less than 7  5  Hyperphosphatemia - last phos 5  monitor phos  6  Hypotension - improved on midodrine, also on metoprolol with hold parameters  7  PNA - s/p cefepime  8  Left pleural effusion s/p thoracentesis 12/24, 12/27        Subjective:   He wants to go home  He feels tired  No chest pain or shortness of breath  Objective:     Vitals: Blood pressure 119/69, pulse 69, temperature 97 6 °F (36 4 °C), temperature source Oral, resp   rate 20, height 5' 9" (1 753 m), weight 81 9 kg (180 lb 8 9 oz), SpO2 95 %  ,Body mass index is 26 66 kg/m²  Temp (24hrs), Av °F (36 7 °C), Min:97 6 °F (36 4 °C), Max:98 2 °F (36 8 °C)      Weight (last 2 days)     Date/Time   Weight    20 0600   81 9 (180 56)    20 0600   81 5 (179 68)    20 0638   81 6 (179 9)                Intake/Output Summary (Last 24 hours) at 2020 1537  Last data filed at 2020 1300  Gross per 24 hour   Intake 225 ml   Output 200 ml   Net 25 ml     I/O last 24 hours: In: 725 [P O :225; I V :500]  Out: 460 [Urine:200; Other:260]        Physical Exam:   Physical Exam   Constitutional: He appears well-developed and well-nourished  No distress  thin   HENT:   Head: Normocephalic and atraumatic  Mouth/Throat: No oropharyngeal exudate  Eyes: Right eye exhibits no discharge  Left eye exhibits no discharge  No scleral icterus  Neck: Neck supple  No thyromegaly present  Cardiovascular: Normal rate, regular rhythm and normal heart sounds  Pulmonary/Chest: Effort normal and breath sounds normal  He has no wheezes  He has no rales  Abdominal: Soft  Bowel sounds are normal  He exhibits no distension  There is no tenderness  Musculoskeletal: Normal range of motion  He exhibits edema (b/l ankles)  Neurological: He is alert  awake   Skin: Skin is warm and dry  No rash noted  He is not diaphoretic  Psychiatric: He has a normal mood and affect  His behavior is normal    Vitals reviewed        Invasive Devices     Peripheral Intravenous Line            Peripheral IV 20 Left Antecubital less than 1 day          Hemodialysis Catheter            HD Permanent Double Catheter 3 days                Medications:    Scheduled Meds:  Current Facility-Administered Medications:  acetaminophen 650 mg Oral Q4H PRN Esther Bumpers, PA-C   albuterol 2 5 mg Nebulization Q6H PRN Sally Perez DO   aspirin 81 mg Oral Daily Sumaya Perry PA-C   bisacodyl 10 mg Rectal Daily PRN Onel Linder PA-C   guaiFENesin 600 mg Oral Q12H [address] Sumaya Perry PA-C   heparin (porcine) 2,000 Units Intravenous After Dialysis Sun Vyas DO   insulin lispro 1-5 Units Subcutaneous TID AC [facility] TRNIY Linder   insulin lispro 1-5 Units Subcutaneous HS [facility] RTINY Linder   iohexol 50 mL Oral 90 min pre-procedure Emmanuel Villalta PA-C   levalbuterol 1 25 mg Nebulization BID Kayley Stewart DO   menthol-methyl salicylate  Apply externally 4x Daily PRN Ann-Marie Hdez PA-C   metoclopramide 5 mg Intravenous Q8H PRN Adarsh Lopez PA-C   metoprolol tartrate 12 5 mg Oral Q12H [address] Jennifer Bardales MD   midodrine 10 mg Oral TID  Yenni Lloyd MD   ondansetron 4 mg Intravenous Q6H Rebeca Lombard, PA-C   oxyCODONE-acetaminophen 1 tablet Oral Q4H PRN Opal Salazar PA-C   oxyCODONE-acetaminophen 2 tablet Oral Q6H PRN Maciel Linder PA-C   pantoprazole 40 mg Oral Daily [facility]   polyethylene glycol 17 g [facility] Daily [facility]   senna-docusate sodium 1 tablet Oral BID Adarsh Weaver PA-C   sodium chloride 3 mL Nebulization BID Kayley Stewart DO   temazepam 15 mg Oral HS PRN Opal Salazar PA-C   warfarin 1 mg Oral Once (warfarin) Emmanuel Villalta PA-C       PRN Meds:   acetaminophen    albuterol    bisacodyl    heparin (porcine)    menthol-methyl salicylate    metoclopramide    oxyCODONE-acetaminophen    oxyCODONE-acetaminophen    temazepam    Continuous Infusions:         LAB RESULTS:      Results from last 7 days   Lab Units 20  0445 20  0557 20  0444 20  0434 20  0553 19  0457 19  0441   WBC Thousand/uL 12 41* 14 06* 16 54* 18 33* 19 09* 21 02* 21 60*   HEMOGLOBIN g/dL 7 7* 8 4* 9 3* 8 4* 9 0* 9 1* 9 7*   HEMATOCRIT % 25 1* 26 6* 30 2* 27 1* 28 5* 28 5* 30 3*   PLATELETS Thousands/uL 229 235 240 251 216 184 281   NEUTROS PCT % 76*  --   --   --   --   --   --    LYMPHS PCT % 7*  --   --   --   --   --   --    LYMPHO PCT %  --  4* 2*  --   --   --  3*   MONOS PCT % 14*  --   --   --   -- --   --    MONO PCT %  --  12 8  --   --   --  9   EOS PCT % 1 0 4  --   --   --  2   POTASSIUM mmol/L 5 2 5 1 4 4 4 3 4 4 4 8 4 2   CHLORIDE mmol/L 104 103 103 107 104 104 101   CO2 mmol/L 27 28 27 25 27 29 26   BUN mg/dL 65* 64* 48* 84* 68* 59* 82*   CREATININE mg/dL 5 30* 5 25* 4 40* 5 90* 5 91* 5 06* 6 46*   CALCIUM mg/dL 7 9* 8 2* 8 2* 8 1* 8 0* 7 7* 8 0*   PHOSPHORUS mg/dL  --   --  5 0* 5 8*  --  4 8*  --        CUTURES:  Lab Results   Component Value Date    BLOODCX No Growth After 5 Days  12/26/2019    URINECX 10,000-19,000 cfu/ml  03/08/2018                 Portions of the record may have been created with voice recognition software  Occasional wrong word or "sound a like" substitutions may have occurred due to the inherent limitations of voice recognition software  Read the chart carefully and recognize, using context, where substitutions have occurred  If you have any questions, please contact the dictating provider

## 2020-01-05 NOTE — PROGRESS NOTES
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, Ascending aortic aneurysm     S/P Replacement of ascending aorta with aggressive hemiarch reconstruction and aortic valve resuspension with a 26 mm Dacron graft; POD # 20      24 Hour Events:  Pt only received approx 1 5 hrs HD yesterday due to HD catheter clot  Pt feels tired today and reports mild chest discomfort and clicking with coughing  No SOB      Medications:   Scheduled Meds:    Current Facility-Administered Medications:  acetaminophen 650 mg Oral Q4H PRN Jose Parish PA-C   albuterol 2 5 mg Nebulization Q6H PRN Annabella King DO   aspirin 81 mg Oral Daily Sumaya Perry PA-C   bisacodyl 10 mg Rectal Daily PRN Jose Parish PA-C   guaiFENesin 600 mg Oral Q12H [facility] Sumaya Prery PA-C   heparin (porcine) 2,000 Units Intravenous After Dialysis Sun Page DO   insulin lispro 1-5 Units Subcutaneous TID AC Alex NEETU Linder PA-C   insulin lispro 1-5 Units Subcutaneous HS Alex Linder PA-C   iohexol 50 mL Oral 90 min pre-procedure Nuvia Lopez PA-C   levalbuterol 1 25 mg Nebulization BID Annabella King DO   menthol-methyl salicylate  Apply externally 4x Daily PRN Ann-Marie Hdez PA-C   metoclopramide 5 mg Intravenous Q8H PRN Annie Weaver PA-C   metoprolol tartrate 12 5 mg Oral Q12H [facility] Yajaiar Willis MD   midodrine 10 mg Oral TID AC Pablo Gomez MD   ondansetron 4 mg Intravenous Q6H Lev Barrow PA-C   oxyCODONE-acetaminophen 1 tablet Oral Q4H PRN Jose Parish PA-C   oxyCODONE-acetaminophen 2 tablet Oral Q6H PRN Yulisa Linder PA-C   pantoprazole 40 mg Oral Daily Alex NEETU Linder PA-C   polyethylene glycol 17 g Oral Daily Yulisa Linder PA-C   senna-docusate sodium 1 tablet Oral BID Annie Weaver PA-C   sodium chloride 3 mL Nebulization BID Annabella King DO   temazepam 15 mg Oral HS PRN Yulisa Walls TRINY Linder     Continuous Infusions:   PRN Meds:   acetaminophen    albuterol    bisacodyl    heparin (porcine)    menthol-methyl salicylate    metoclopramide    oxyCODONE-acetaminophen    oxyCODONE-acetaminophen    temazepam    Vitals:   Vitals:    01/04/20 1900 01/04/20 1924 01/04/20 2305 01/05/20 0324   BP: 115/67  120/82 111/71   BP Location: Left arm  Left arm Left arm   Pulse: 71  72 66   Resp: 18  18 18   Temp: 97 9 °F (36 6 °C)  98 2 °F (36 8 °C) 98 1 °F (36 7 °C)   TempSrc: Oral  Oral Oral   SpO2: 96% 95% 92% 91%   Weight:       Height:           Telemetry: Afib;  Heart Rate: 75    Respiratory:   SpO2: SpO2: 91 %; Room air    Intake/Output: +40 ml / 24 hrs  I/O       12/28 0701 - 12/29 0700 12/29 0701 - 12/30 0700 12/30 0701 - 12/31 0700    P  O  720 1050     I V  (mL/kg) 500 (6 3)      Total Intake(mL/kg) 1220 (15 3) 1050 (13)     Urine (mL/kg/hr) 100 (0 1) 450 (0 2)     Emesis/NG output 0      Other 301      Stool 0      Total Output 401 450     Net +819 +600            Unmeasured Urine Occurrence 2 x      Unmeasured Stool Occurrence 3 x      Unmeasured Emesis Occurrence 1 x                Weights:   Weight (last 2 days)     Date/Time   Weight    01/04/20 0600   81 5 (179 68)    01/03/20 0638   81 6 (179 9)            Admit weight: 83 5 kg    Results:   Results from last 7 days   Lab Units 01/04/20  0557 01/03/20  0444 01/02/20  0434   WBC Thousand/uL 14 06* 16 54* 18 33*   HEMOGLOBIN g/dL 8 4* 9 3* 8 4*   HEMATOCRIT % 26 6* 30 2* 27 1*   PLATELETS Thousands/uL 235 240 251     Results from last 7 days   Lab Units 01/05/20  0445 01/04/20  0557 01/03/20  0444   SODIUM mmol/L 137 138 138   POTASSIUM mmol/L 5 2 5 1 4 4   CHLORIDE mmol/L 104 103 103   CO2 mmol/L 27 28 27   BUN mg/dL 65* 64* 48*   CREATININE mg/dL 5 30* 5 25* 4 40*   CALCIUM mg/dL 7 9* 8 2* 8 2*     Results from last 7 days   Lab Units 01/05/20  0445 01/04/20  1357 01/03/20  0444   INR  1 54* 1 56* 1 48*       Coumadin mg 2, 0, 2, 0, 0, 1 1 1 0 0 0       Point of care glucose: BS 81 - 186   I unt SSIC / 24 hrs required    Studies:  No studies past 24 hrs      Invasive Lines/Tubes:  Invasive Devices     Peripheral Intravenous Line            Peripheral IV 01/05/20 Left Antecubital less than 1 day          Hemodialysis Catheter            HD Permanent Double Catheter 2 days                Physical Exam:    HEENT/NECK:  PERRLA  No jugular venous distention  Cardiac: Irregularly irregular rate and rhythm and No murmurs/rubs/gallops  Pulmonary:  Breath sounds clear bilaterally and No rales/rhonchi/wheezes  Abdomen:  Non-tender, Non-distended and Normal bowel sounds  Incisions: Sternal and axillary cannulation incisions C/D/I  Sternal instability and click noted with deep inspiration/cough  Extremities: Extremities warm/dry, Radial pulses 2+ bilaterally and 1+ edema B/L  Neuro: Alert and oriented X 3, Sensation is grossly intact and No focal deficits  Skin: Warm/Dry, without rashes or lesions  Assessment:  Principal Problem:    Dissection of thoracic aorta (HCC)  Active Problems:    Benign essential hypertension    Abnormal TSH    Benign hypertension with CKD (chronic kidney disease) stage III (HCC)    Benign prostatic hyperplasia without lower urinary tract symptoms    S/P aorta repair    Postoperative anemia due to acute blood loss    Anticoagulation goal of INR 2 to 3    AFSHIN (acute kidney injury) ([address] )    Hyperphosphatemia    Hypotension    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, Ascending aortic aneurysm     S/P Replacement of ascending aorta with aggressive hemiarch reconstruction and aortic valve resuspension with a 26 mm Dacron graft; POD # 20    Plan:    1   Cardiac:   NSR; HR/BP well-controlled  Lopressor, 12 5mg PO BID   Midodrine 10mg TID  Continue ASA and Statin therapy  Epicardial pacing wires out  Patient no longer has central IV access Continue DVT prophylaxis  PAF - Coumadin 1mg tonight (lower dosing in anticipation of IR procedure tomorrow)  Sternal dissociation - pt asymptomatic  Continue to monitor  2  Pulmonary:    Acute post-op pulmonary insufficiency resolved  Good room air oxygen saturation  Chest tubes have been discontinued    3  Renal:   Intake/Output net: +40 mL/24 hours  AFSHIN on CKD 3 - Partial HD treatment yesterday due to Permacath clot/malfunction  Will required IR evaluation (cathetergram vs replacement?) on Monday  Renal following  NPO after MN tonight  4  Neuro:  Neurologically intact; No active issues  Incisional pain well-controlled; Continue prn Percocet    5  GI:  Tolerating TLC 2 3 gm sodium diet  Maintain 1800 mL daily fluid restriction   Continue stool softeners and prn suppository  Continue GI prophylaxis    6  Endo:   No history of diabetes; Glucose well-controlled with sliding scale coverage    7    Hematology:     Post-operative acute blood loss anemia; Hemoglobin 8 4; trend prn  Leukocytosis -stable, afebrile  Monitor  ID: Serratia Sputum - WBC downtrending, afebrile,  Cefepime course completed  ID signed off  S/P thoracentesis x 2 - fluid negative and blood culture negative to date    8  Disposition:      Will require hospitalization through at least Monday for IR intervention        VTE Pharmacologic Prophylaxis: Warfarin (Coumadin)  VTE Mechanical Prophylaxis: sequential compression device    Collaborative rounds completed with Steven Flores MD  Plan of care discussed with bedside nurse    SIGNATURE: Jose Alfredo Lloyd PA-C  DATE: January 5, 2020  TIME: 7:23 AM

## 2020-01-06 ENCOUNTER — APPOINTMENT (INPATIENT)
Dept: RADIOLOGY | Facility: HOSPITAL | Age: 80
DRG: 219 | End: 2020-01-06
Attending: INTERNAL MEDICINE
Payer: COMMERCIAL

## 2020-01-06 ENCOUNTER — APPOINTMENT (INPATIENT)
Dept: DIALYSIS | Facility: HOSPITAL | Age: 80
DRG: 219 | End: 2020-01-06
Attending: INTERNAL MEDICINE
Payer: COMMERCIAL

## 2020-01-06 LAB
ANION GAP SERPL CALCULATED.3IONS-SCNC: 6 MMOL/L (ref 4–13)
ATRIAL RATE: 125 BPM
BASOPHILS # BLD AUTO: 0.03 THOUSANDS/ΜL (ref 0–0.1)
BASOPHILS NFR BLD AUTO: 0 % (ref 0–1)
BUN SERPL-MCNC: 77 MG/DL (ref 5–25)
CALCIUM SERPL-MCNC: 8 MG/DL (ref 8.3–10.1)
CHLORIDE SERPL-SCNC: 106 MMOL/L (ref 100–108)
CO2 SERPL-SCNC: 29 MMOL/L (ref 21–32)
CREAT SERPL-MCNC: 5.97 MG/DL (ref 0.6–1.3)
EOSINOPHIL # BLD AUTO: 0.18 THOUSAND/ΜL (ref 0–0.61)
EOSINOPHIL NFR BLD AUTO: 2 % (ref 0–6)
ERYTHROCYTE [DISTWIDTH] IN BLOOD BY AUTOMATED COUNT: 15.6 % (ref 11.6–15.1)
GFR SERPL CREATININE-BSD FRML MDRD: 8 ML/MIN/1.73SQ M
GLUCOSE SERPL-MCNC: 107 MG/DL (ref 65–140)
GLUCOSE SERPL-MCNC: 120 MG/DL (ref 65–140)
GLUCOSE SERPL-MCNC: 82 MG/DL (ref 65–140)
GLUCOSE SERPL-MCNC: 87 MG/DL (ref 65–140)
GLUCOSE SERPL-MCNC: 93 MG/DL (ref 65–140)
HCT VFR BLD AUTO: 27 % (ref 36.5–49.3)
HGB BLD-MCNC: 8.6 G/DL (ref 12–17)
IMM GRANULOCYTES # BLD AUTO: 0.25 THOUSAND/UL (ref 0–0.2)
IMM GRANULOCYTES NFR BLD AUTO: 2 % (ref 0–2)
INR PPP: 1.55 (ref 0.84–1.19)
LYMPHOCYTES # BLD AUTO: 0.99 THOUSANDS/ΜL (ref 0.6–4.47)
LYMPHOCYTES NFR BLD AUTO: 8 % (ref 14–44)
MCH RBC QN AUTO: 30.8 PG (ref 26.8–34.3)
MCHC RBC AUTO-ENTMCNC: 31.9 G/DL (ref 31.4–37.4)
MCV RBC AUTO: 97 FL (ref 82–98)
MONOCYTES # BLD AUTO: 1.87 THOUSAND/ΜL (ref 0.17–1.22)
MONOCYTES NFR BLD AUTO: 16 % (ref 4–12)
NEUTROPHILS # BLD AUTO: 8.56 THOUSANDS/ΜL (ref 1.85–7.62)
NEUTS SEG NFR BLD AUTO: 72 % (ref 43–75)
NRBC BLD AUTO-RTO: 0 /100 WBCS
PLATELET # BLD AUTO: 286 THOUSANDS/UL (ref 149–390)
PMV BLD AUTO: 9.9 FL (ref 8.9–12.7)
POTASSIUM SERPL-SCNC: 5.4 MMOL/L (ref 3.5–5.3)
PROTHROMBIN TIME: 18.1 SECONDS (ref 11.6–14.5)
QRS AXIS: 43 DEGREES
QRSD INTERVAL: 82 MS
QT INTERVAL: 390 MS
QTC INTERVAL: 453 MS
RBC # BLD AUTO: 2.79 MILLION/UL (ref 3.88–5.62)
SODIUM SERPL-SCNC: 141 MMOL/L (ref 136–145)
T WAVE AXIS: 219 DEGREES
VENTRICULAR RATE: 81 BPM
WBC # BLD AUTO: 11.88 THOUSAND/UL (ref 4.31–10.16)

## 2020-01-06 PROCEDURE — C1769 GUIDE WIRE: HCPCS

## 2020-01-06 PROCEDURE — C1750 CATH, HEMODIALYSIS,LONG-TERM: HCPCS

## 2020-01-06 PROCEDURE — 36581 REPLACE TUNNELED CV CATH: CPT | Performed by: RADIOLOGY

## 2020-01-06 PROCEDURE — 90935 HEMODIALYSIS ONE EVALUATION: CPT | Performed by: INTERNAL MEDICINE

## 2020-01-06 PROCEDURE — 85025 COMPLETE CBC W/AUTO DIFF WBC: CPT | Performed by: INTERNAL MEDICINE

## 2020-01-06 PROCEDURE — 0J2SXYZ CHANGE OTHER DEVICE IN HEAD AND NECK SUBCUTANEOUS TISSUE AND FASCIA, EXTERNAL APPROACH: ICD-10-PCS | Performed by: RADIOLOGY

## 2020-01-06 PROCEDURE — 36581 REPLACE TUNNELED CV CATH: CPT

## 2020-01-06 PROCEDURE — 80048 BASIC METABOLIC PNL TOTAL CA: CPT | Performed by: INTERNAL MEDICINE

## 2020-01-06 PROCEDURE — 94640 AIRWAY INHALATION TREATMENT: CPT

## 2020-01-06 PROCEDURE — 82948 REAGENT STRIP/BLOOD GLUCOSE: CPT

## 2020-01-06 PROCEDURE — 77001 FLUOROGUIDE FOR VEIN DEVICE: CPT

## 2020-01-06 PROCEDURE — 94760 N-INVAS EAR/PLS OXIMETRY 1: CPT

## 2020-01-06 PROCEDURE — 77001 FLUOROGUIDE FOR VEIN DEVICE: CPT | Performed by: RADIOLOGY

## 2020-01-06 PROCEDURE — 99024 POSTOP FOLLOW-UP VISIT: CPT | Performed by: THORACIC SURGERY (CARDIOTHORACIC VASCULAR SURGERY)

## 2020-01-06 PROCEDURE — 93005 ELECTROCARDIOGRAM TRACING: CPT

## 2020-01-06 PROCEDURE — 85610 PROTHROMBIN TIME: CPT | Performed by: PHYSICIAN ASSISTANT

## 2020-01-06 PROCEDURE — 93010 ELECTROCARDIOGRAM REPORT: CPT | Performed by: INTERNAL MEDICINE

## 2020-01-06 RX ORDER — LIDOCAINE HYDROCHLORIDE AND EPINEPHRINE 10; 10 MG/ML; UG/ML
INJECTION, SOLUTION INFILTRATION; PERINEURAL CODE/TRAUMA/SEDATION MEDICATION
Status: COMPLETED | OUTPATIENT
Start: 2020-01-06 | End: 2020-01-06

## 2020-01-06 RX ORDER — MIDODRINE HYDROCHLORIDE 5 MG/1
5 TABLET ORAL
Status: DISCONTINUED | OUTPATIENT
Start: 2020-01-06 | End: 2020-01-07

## 2020-01-06 RX ORDER — WARFARIN SODIUM 1 MG/1
3 TABLET ORAL
Status: COMPLETED | OUTPATIENT
Start: 2020-01-06 | End: 2020-01-06

## 2020-01-06 RX ADMIN — METOPROLOL TARTRATE 12.5 MG: 25 TABLET ORAL at 21:45

## 2020-01-06 RX ADMIN — ISODIUM CHLORIDE 3 ML: 0.03 SOLUTION RESPIRATORY (INHALATION) at 07:08

## 2020-01-06 RX ADMIN — ONDANSETRON 4 MG: 2 INJECTION INTRAMUSCULAR; INTRAVENOUS at 12:44

## 2020-01-06 RX ADMIN — SENNOSIDES AND DOCUSATE SODIUM 1 TABLET: 8.6; 5 TABLET ORAL at 10:46

## 2020-01-06 RX ADMIN — PANTOPRAZOLE SODIUM 40 MG: 40 TABLET, DELAYED RELEASE ORAL at 10:46

## 2020-01-06 RX ADMIN — MIDODRINE HYDROCHLORIDE 5 MG: 5 TABLET ORAL at 10:48

## 2020-01-06 RX ADMIN — MIDODRINE HYDROCHLORIDE 5 MG: 5 TABLET ORAL at 17:39

## 2020-01-06 RX ADMIN — ASPIRIN 81 MG 81 MG: 81 TABLET ORAL at 10:46

## 2020-01-06 RX ADMIN — MIDODRINE HYDROCHLORIDE 10 MG: 5 TABLET ORAL at 06:06

## 2020-01-06 RX ADMIN — OXYCODONE HYDROCHLORIDE AND ACETAMINOPHEN 1 TABLET: 5; 325 TABLET ORAL at 12:44

## 2020-01-06 RX ADMIN — GUAIFENESIN 600 MG: 600 TABLET, EXTENDED RELEASE ORAL at 21:45

## 2020-01-06 RX ADMIN — POLYETHYLENE GLYCOL 3350 17 G: 17 POWDER, FOR SOLUTION ORAL at 10:47

## 2020-01-06 RX ADMIN — GUAIFENESIN 600 MG: 600 TABLET, EXTENDED RELEASE ORAL at 10:46

## 2020-01-06 RX ADMIN — METOPROLOL TARTRATE 12.5 MG: 25 TABLET ORAL at 10:47

## 2020-01-06 RX ADMIN — LEVALBUTEROL HYDROCHLORIDE 1.25 MG: 1.25 SOLUTION, CONCENTRATE RESPIRATORY (INHALATION) at 07:08

## 2020-01-06 RX ADMIN — WARFARIN SODIUM 3 MG: 1 TABLET ORAL at 17:39

## 2020-01-06 RX ADMIN — LIDOCAINE HYDROCHLORIDE AND EPINEPHRINE 3 ML: 10; 10 INJECTION, SOLUTION INFILTRATION; PERINEURAL at 09:01

## 2020-01-06 NOTE — PLAN OF CARE
For 2 hr treatment today, on a 2 k bath, patients's k was 5 4 today will be TTS at [facility]  To remove  1 kg today per Dr Coby Thapa  For his regular tx tomorrow    Problem: METABOLIC, FLUID AND ELECTROLYTES - ADULT  Goal: Electrolytes maintained within normal limits  Description  INTERVENTIONS:  - Monitor labs and assess patient for signs and symptoms of electrolyte imbalances  - Administer electrolyte replacement as ordered  - Monitor response to electrolyte replacements, including repeat lab results as appropriate  - Instruct patient on fluid and nutrition as appropriate  Outcome: Progressing     Problem: METABOLIC, FLUID AND ELECTROLYTES - ADULT  Goal: Fluid balance maintained  Description  INTERVENTIONS:  - Monitor labs   - Monitor I/O and WT  - Instruct patient on fluid and nutrition as appropriate  - Assess for signs & symptoms of volume excess or deficit  Outcome: Progressing

## 2020-01-06 NOTE — OCCUPATIONAL THERAPY NOTE
Occupational Therapy Cancellation        Patient Name: Manuela Weir Date: 1/6/2020    Chart reviewed  Pt off floor in IR for permacath exchange  OT will continue to follow to see as able      Margarita Smith MS, OTR/L

## 2020-01-06 NOTE — PROGRESS NOTES
NEPHROLOGY PROGRESS NOTE   Sherry Riggs [facility] 70 y o  male MRN: [id number]  Unit/Bed#: [facility] 412-01 Encounter: [id number]  Reason for Consult: AFSHIN    ASSESSMENT/PLAN:  1  Acute kidney injury:  Suspect secondary to postop ATN, contrast nephropathy with CT scan on admission  -now HD dependent since 12/23/2019  -for hemodialysis today for 2 hours with shortened treatment on Saturday  -plan full treatment tomorrow prior to plan discharge per primary team  -status post PermCath exchange on 01/02/2020 and now exchanged today  -workup revealed low C3 with normal C4 along with proteinuria in the past  -per Dr Georgiana Castleman note from yesterday, "Isolated low C3 can be seen in the setting of PIGN  Will defer renal biopsy at this time in light of atrophic right kidney   Hopeful for slow possibly recovery "  Workup: chronic hepatitis panel negative, urine protein electrophoresis shows possible faint band   -anti-GBM, Anca, AMY panel negative  -Serum IF on 12/30/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 Consult hematology  -avoid nephrotoxins  -avoid hypotension  -continue trend I/O, lab values volume status  -continue to monitor for renal recovery  -plan outpatient dialysis at [address] [place] [address]-[facility] chair time 10:45 a m     2  Access:  Right PermCath-replace today be IR-site intact    3  Chronic kidney disease III:  In the setting of atrophic kidneys  -baseline creatinine after review medical records 1 4-1 8 followed Dr Tammi De Jesus outpatient setting  -will continue to follow for renal recovery    4  Type a aortic dissection:  With intramural hematoma status post repair on 12/15/2019  -CT surgery following  -tentative discharge tomorrow post HD treatment    5  Anemia:  Likely Chronic Kidney Disease with acute component with blood loss from surgery  - i hemoglobin stable at 8 6  -recommend transfusion for hemoglobin less than 7 0  -continue to trend    6   Hyperphosphatemia:  Likely secondary to AK I  -will check phosphorus in a m   -last phosphorus on 01/03/2020 was 5 0 and was slowly improving    7  Hypotension:  Improved with midodrine 5 mg t i d  With meals  -continue to trend  -patient also metoprolol with hold parameters    8  PN a:  Status post cefepime course    9  Left pleural effusion; status post thoracentesis on 12/24/2019 and 12/27/2019    10  Hypokalemia:  Potassium 5 4  -will treat with hemodialysis on two K bath  -will continue trend      Disposition:  Patient continues to require hemodialysis for acute kidney injury  Okay for discharge from a renal standpoint and already has an outpatient chair time at [address] every TTS  Spoke with PA from CT surgery and for tentative discharge tomorrow post HD treatment    SUBJECTIVE:  Patient seen and examined  Denies chest pain shortness of breath  Status post PermCath exchange today       OBJECTIVE:  Current Weight: Weight - Scale: 82 kg (180 lb 12 4 oz)  Vitals:    01/06/20 0600 01/06/20 0708 01/06/20 0726 01/06/20 1046   BP:   146/91 146/94   BP Location:   Left arm    Pulse:   76 (!) 125   Resp:   18    Temp:   98 6 °F (37 °C)    TempSrc:   Oral    SpO2:  94% 96%    Weight: 82 kg (180 lb 12 4 oz)      Height:           Intake/Output Summary (Last 24 hours) at 1/6/2020 1128  Last data filed at 1/6/2020 0726  Gross per 24 hour   Intake 300 ml   Output 750 ml   Net -450 ml     General:  No acute distress, out of bed, cooperative  Skin:  Warm and dry that rash  Eyes:  Sclera anicteric  ENMT:  Mucous membranes moist  Neck:  Supple without JVD  Respiratory:  Clear on auscultation without crackles, rhonchi, wheezes  Cardiac:  Regular rate without rub  Extremities:  Trace lower extremity edema noted bilaterally  GI:  Soft, nontender, no distention, active bowel sounds  Neuro:  Alert oriented and awake  Psych:  Appropriate affect    Medications:    Current Facility-Administered Medications:     acetaminophen (TYLENOL) tablet 650 mg, 650 mg, Oral, Q4H PRN, Chuy Dozier PA-C, 650 mg at 01/05/20 0229    albuterol inhalation solution 2 5 mg, 2 5 mg, Nebulization, Q6H PRN, Jeanine Hills DO    aspirin chewable tablet 81 mg, 81 mg, Oral, Daily, Sumaya Perry PA-C, 81 mg at 01/06/20 1046    bisacodyl (DULCOLAX) rectal suppository 10 mg, 10 mg, Rectal, Daily PRN, TRINY CarbajalFENesin [facility]) 12 hr tablet 600 mg, 600 mg, Oral, Q12H [address], Sumaya Perry PA-C, 600 mg at 01/06/20 1046    heparin (porcine) injection 2,000 Units, 2,000 Units, Intravenous, After Dialysis, Lennox Dumont DO, 2,000 Units at 01/04/20 0908    insulin lispro (HumaLOG) 100 units/mL subcutaneous injection 1-5 Units, 1-5 Units, Subcutaneous, TID AC, 1 Units at 01/04/20 1643 **AND** Fingerstick Glucose (POCT), , , TID AC, Alex Linder PA-C    insulin lispro (HumaLOG) 100 units/mL subcutaneous injection 1-5 Units, 1-5 Units, Subcutaneous, HS, Chuy Dozier PA-C, 1 Units at 12/30/19 2158    iohexol (OMNIPAQUE) 240 MG/ML solution 50 mL, 50 mL, Oral, 90 min pre-procedure, Jn Hernández PA-C    menthol-methyl salicylate (BENGAY) 80-53 % cream, , Apply externally, 4x Daily PRN, Ann-Marie Hdez PA-C    metoclopramide (REGLAN) injection 5 mg, 5 mg, Intravenous, Q8H PRN, Ca Weaver PA-C, 5 mg at 12/28/19 2118    metoprolol tartrate (LOPRESSOR) partial tablet 12 5 mg, 12 5 mg, Oral, Q12H [address], Melissa Donovan MD, 12 5 mg at 01/06/20 1047    midodrine (PROAMATINE) tablet 5 mg, 5 mg, Oral, TID AC, Gretta See PA-C, 5 mg at 01/06/20 1048    ondansetron (ZOFRAN) injection 4 mg, 4 mg, Intravenous, Q6H, Ca Weaver PA-C, 4 mg at 01/05/20 1731    oxyCODONE-acetaminophen (PERCOCET) 5-325 mg per tablet 1 tablet, 1 tablet, Oral, Q4H PRN, Chuy Dozier PA-C, 1 tablet at 01/04/20 0545    oxyCODONE-acetaminophen (PERCOCET) 5-325 mg per tablet 2 tablet, 2 tablet, Oral, Q6H PRN, Chuy Dozier PA-C, 2 tablet at 12/28/19 1333    pantoprazole (PROTONIX) EC tablet 40 mg, 40 mg, Oral, Daily, Abeba Linder PA-C, 40 mg at 01/06/20 1046    polyethylene glycol (MIRALAX) packet 17 g, 17 g, Oral, Daily, Abeba Linder PA-C, 17 g at 01/06/20 1047    senna-docusate sodium (SENOKOT S) 8 6-50 mg per tablet 1 tablet, 1 tablet, Oral, BID, Tod Lopez PA-C, 1 tablet at 01/06/20 1046    temazepam (RESTORIL) capsule 15 mg, 15 mg, Oral, HS PRN, Abeba Linder PA-C, 15 mg at 01/04/20 0212    warfarin (COUMADIN) tablet 3 mg, 3 mg, Oral, Once (warfarin), Jase Jasmine PA-C    Laboratory Results:  Results from last 7 days   Lab Units 01/06/20  0542 01/05/20  0445 01/04/20  0557 01/03/20  0444 01/02/20  0434 01/01/20  0553 12/31/19  0457   WBC Thousand/uL 11 88* 12 41* 14 06* 16 54* 18 33* 19 09* 21 02*   HEMOGLOBIN g/dL 8 6* 7 7* 8 4* 9 3* 8 4* 9 0* 9 1*   HEMATOCRIT % 27 0* 25 1* 26 6* 30 2* 27 1* 28 5* 28 5*   PLATELETS Thousands/uL 286 229 235 240 251 216 184   POTASSIUM mmol/L 5 4* 5 2 5 1 4 4 4 3 4 4 4 8   CHLORIDE mmol/L 106 104 103 103 107 104 104   CO2 mmol/L 29 27 28 27 25 27 29   BUN mg/dL 77* 65* 64* 48* 84* 68* 59*   CREATININE mg/dL 5 97* 5 30* 5 25* 4 40* 5 90* 5 91* 5 06*   CALCIUM mg/dL 8 0* 7 9* 8 2* 8 2* 8 1* 8 0* 7 7*   PHOSPHORUS mg/dL  --   --   --  5 0* 5 8*  --  4 8*

## 2020-01-06 NOTE — PHYSICAL THERAPY NOTE
Physical Therapy Cancellation Note    Pt is off the floor in IR this AM; will follow as clinical course allows      Salma Champion, PT

## 2020-01-06 NOTE — BRIEF OP NOTE (RAD/CATH)
[facility] EXCHANGE Procedure Note    PATIENT NAME: Crow SEBASTIAN Core  : 1940  MRN: [id number]    Pre-op Diagnosis:   1  Dissection of thoracic aorta ([address] )    2  S/P aorta repair    3  Intramural aortic hematoma ([address] )    4  Acute kidney injury superimposed on chronic kidney disease ([address] )    5  Abnormal sputum      Post-op Diagnosis:   1  Dissection of thoracic aorta ([address] )    2  S/P aorta repair    3  Intramural aortic hematoma ([address] )    4  Acute kidney injury superimposed on chronic kidney disease ([address] )    5  Abnormal sputum        Surgeon:   Koby Sanchez MD  Assistants:     No qualified resident was available, Resident is only observing    Estimated Blood Loss:  Minimal  Findings:  Successful exchange for a longer 23 cm tip to cuff length dialysis catheter      Specimens:  None    Complications:  None    Anesthesia: Local    Koby Sanchez MD     Date: 2020  Time: 9:35 AM

## 2020-01-06 NOTE — PROGRESS NOTES
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, Ascending aortic aneurysm     S/P Replacement of ascending aorta with aggressive hemiarch reconstruction and aortic valve resuspension with a 26 mm Dacron graft; POD # 21      24 Hour Events:  No acute events  Currently NPO for cathetergram today       Medications:   Scheduled Meds:    Current Facility-Administered Medications:  acetaminophen 650 mg Oral Q4H PRN Giovany Morrow PA-C   albuterol 2 5 mg Nebulization Q6H PRN Vivian Dumont DO   aspirin 81 mg Oral Daily Sumaya Perry PA-C   bisacodyl 10 mg Rectal Daily PRN Giovany Morrow PA-C   guaiFENesin 600 mg Oral Q12H [address] Sumaya Perry PA-C   heparin (porcine) 2,000 Units Intravenous After Dialysis Sun Baez,    insulin lispro 1-5 Units Subcutaneous TID AC [facility] TRINY Linder   insulin lispro 1-5 Units Subcutaneous HS [facility] TRINY Linder   iohexol 50 mL Oral 90 min pre-procedure TRINY Rayo   menthol-methyl salicylate  Apply externally 4x Daily PRN Ann-Marie Hdez PA-C   metoclopramide 5 mg Intravenous Q8H PRN Lamonte Nova PA-C   metoprolol tartrate 12 5 mg Oral Q12H [address] Bharath Burgos MD   midodrine 10 mg Oral TID AC Darlin Braden MD   ondansetron 4 mg Intravenous Q6H Susshiva Gonzalez PA-C   oxyCODONE-acetaminophen 1 tablet Oral Q4H PRN Giovany Morrow PA-C   oxyCODONE-acetaminophen 2 tablet Oral Q6H PRN Shania Linder PA-C   pantoprazole 40 mg Oral Daily [facility] TRINY Linder   polyethylene glycol 17 g [facility]   senna-docusate sodium 1 tablet Oral BID Lamonte Nova PA-C   temazepam 15 mg Oral HS PRN Shania Linder PA-C     Continuous Infusions:   PRN Meds:   acetaminophen    albuterol    bisacodyl    heparin (porcine)    menthol-methyl salicylate    metoclopramide    oxyCODONE-acetaminophen   oxyCODONE-acetaminophen    temazepam    Vitals:   Vitals:    01/06/20 0335 01/06/20 0600 01/06/20 0708 01/06/20 0726   BP: 126/74   146/91   BP Location: Left arm   Left arm   Pulse: 78   76   Resp: 18   18   Temp: 98 3 °F (36 8 °C)   98 6 °F (37 °C)   TempSrc: Oral   Oral   SpO2: 92%  94% 96%   Weight:  82 kg (180 lb 12 4 oz)     Height:           Telemetry: Afib;  Heart Rate: 75    Respiratory:   SpO2: SpO2: 96 %; Room air    Intake/Output: +40 ml / 24 hrs  I/O       12/28 0701 - 12/29 0700 12/29 0701 - 12/30 0700 12/30 0701 - 12/31 0700    P  O  720 1050     I V  (mL/kg) 500 (6 3)      Total Intake(mL/kg) 1220 (15 3) 1050 (13)     Urine (mL/kg/hr) 100 (0 1) 450 (0 2)     Emesis/NG output 0      Other 301      Stool 0      Total Output 401 450     Net +819 +600            Unmeasured Urine Occurrence 2 x      Unmeasured Stool Occurrence 3 x      Unmeasured Emesis Occurrence 1 x                Weights:   Weight (last 2 days)     Date/Time   Weight    01/06/20 0600   82 (180 78)    01/05/20 0600   81 9 (180 56)    01/04/20 0600   81 5 (179 68)            Admit weight: 83 5 kg    Results:   Results from last 7 days   Lab Units 01/06/20  0542 01/05/20  0445 01/04/20  0557   WBC Thousand/uL 11 88* 12 41* 14 06*   HEMOGLOBIN g/dL 8 6* 7 7* 8 4*   HEMATOCRIT % 27 0* 25 1* 26 6*   PLATELETS Thousands/uL 286 229 235     Results from last 7 days   Lab Units 01/06/20  0542 01/05/20  0445 01/04/20  0557   SODIUM mmol/L 141 137 138   POTASSIUM mmol/L 5 4* 5 2 5 1   CHLORIDE mmol/L 106 104 103   CO2 mmol/L 29 27 28   BUN mg/dL 77* 65* 64*   CREATININE mg/dL 5 97* 5 30* 5 25*   CALCIUM mg/dL 8 0* 7 9* 8 2*     Results from last 7 days   Lab Units 01/06/20  0542 01/05/20  0445 01/04/20  1357   INR  1 55* 1 54* 1 56*       Glucose 101 to 134         Studies:  No studies past 24 hrs      Invasive Lines/Tubes:  Invasive Devices     Peripheral Intravenous Line            Peripheral IV 01/05/20 Left Antecubital 1 day Hemodialysis Catheter            HD Permanent Double Catheter 3 days                Physical Exam:    HEENT/NECK:  PERRLA  No jugular venous distention  Cardiac: Irregularly irregular rate and rhythm  Pulmonary:  Breath sounds clear bilaterally, No rales/rhonchi/wheezes and mild sternal instability  Abdomen:  Non-tender, Non-distended and Normal bowel sounds  Incisions: Mild sternal instability with paradoxical movement upon deep inspiration  Extremities: Extremities warm/dry and Radial pulses 2+ bilaterally  Neuro: Alert and oriented X 3 and Sensation is grossly intact  Skin: Warm/Dry, without rashes or lesions  Assessment:  Principal Problem:    Dissection of thoracic aorta (HCC)  Active Problems:    Benign essential hypertension    Abnormal TSH    Benign hypertension with CKD (chronic kidney disease) stage III (HCC)    Benign prostatic hyperplasia without lower urinary tract symptoms    S/P aorta repair    Postoperative anemia due to acute blood loss    Anticoagulation goal of INR 2 to 3    AFSHIN (acute kidney injury) ([address] )    Hyperphosphatemia    Hypotension    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    Complication of vascular dialysis catheter       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, Ascending aortic aneurysm     S/P Replacement of ascending aorta with aggressive hemiarch reconstruction and aortic valve resuspension with a 26 mm Dacron graft; POD # 21    Plan:    1  Cardiac:   NSR; HR/BP well-controlled  Lopressor, 12 5mg PO BID   Midodrine 10mg TID, BP better, will start to taper  Continue ASA and Statin therapy  Epicardial pacing wires out  Patient no longer has central IV access   Continue DVT prophylaxis  PAF - Coumadin 1mg tonight (lower dosing in anticipation of IR procedure tomorrow)  Sternal dissociation - pt asymptomatic  Continue to monitor  2  Pulmonary:    Acute post-op pulmonary insufficiency resolved   Good room air oxygen saturation  Chest tubes have been discontinued    3  Renal:   Intake/Output net: -200 mL/24 hours  AFSHIN on CKD 3 - Partial HD treatment Sat due to Permacath clot/malfunction  Will required IR evaluation (cathetergram vs replacement?) on Monday  Renal following  NPO after MN tonight  4  Neuro:  Neurologically intact; No active issues  Incisional pain well-controlled; Continue prn Percocet    5  GI:  Tolerating TLC 2 3 gm sodium diet  Maintain 1800 mL daily fluid restriction   Continue stool softeners and prn suppository  Continue GI prophylaxis    6  Endo:   No history of diabetes; Glucose well-controlled with sliding scale coverage    7    Hematology:     Post-operative acute blood loss anemia; Hemoglobin 8 4; trend prn  Leukocytosis -stable, afebrile  Monitor  8    Disposition:      Plan to discharge tomorrow after HD        VTE Pharmacologic Prophylaxis: Warfarin (Coumadin)  VTE Mechanical Prophylaxis: sequential compression device    Collaborative rounds completed with Jannie Salazar MD  Plan of care discussed with bedside nurse    SIGNATURE: Kaya Shine PA-C  DATE: January 6, 2020  TIME: 7:28 AM

## 2020-01-07 ENCOUNTER — ANTICOAG VISIT (OUTPATIENT)
Dept: CARDIOLOGY CLINIC | Facility: CLINIC | Age: 80
End: 2020-01-07

## 2020-01-07 ENCOUNTER — APPOINTMENT (INPATIENT)
Dept: DIALYSIS | Facility: HOSPITAL | Age: 80
DRG: 219 | End: 2020-01-07
Payer: COMMERCIAL

## 2020-01-07 ENCOUNTER — TELEPHONE (OUTPATIENT)
Dept: HEMATOLOGY ONCOLOGY | Facility: CLINIC | Age: 80
End: 2020-01-07

## 2020-01-07 VITALS
BODY MASS INDEX: 26.19 KG/M2 | DIASTOLIC BLOOD PRESSURE: 81 MMHG | RESPIRATION RATE: 18 BRPM | SYSTOLIC BLOOD PRESSURE: 144 MMHG | HEART RATE: 78 BPM | HEIGHT: 69 IN | OXYGEN SATURATION: 91 % | WEIGHT: 176.81 LBS | TEMPERATURE: 97.6 F

## 2020-01-07 DIAGNOSIS — Z51.81 ANTICOAGULATION GOAL OF INR 2 TO 3: ICD-10-CM

## 2020-01-07 DIAGNOSIS — Z79.01 ANTICOAGULATION GOAL OF INR 2 TO 3: ICD-10-CM

## 2020-01-07 DIAGNOSIS — D47.2 IGG LAMBDA MONOCLONAL GAMMOPATHY: Primary | ICD-10-CM

## 2020-01-07 PROBLEM — J18.9 PNEUMONIA: Status: ACTIVE | Noted: 2020-01-07

## 2020-01-07 LAB
ANION GAP SERPL CALCULATED.3IONS-SCNC: 5 MMOL/L (ref 4–13)
BUN SERPL-MCNC: 59 MG/DL (ref 5–25)
CALCIUM SERPL-MCNC: 8.2 MG/DL (ref 8.3–10.1)
CHLORIDE SERPL-SCNC: 106 MMOL/L (ref 100–108)
CO2 SERPL-SCNC: 29 MMOL/L (ref 21–32)
CREAT SERPL-MCNC: 4.91 MG/DL (ref 0.6–1.3)
GFR SERPL CREATININE-BSD FRML MDRD: 11 ML/MIN/1.73SQ M
GLUCOSE SERPL-MCNC: 81 MG/DL (ref 65–140)
GLUCOSE SERPL-MCNC: 81 MG/DL (ref 65–140)
INR PPP: 1.68 (ref 0.84–1.19)
PHOSPHATE SERPL-MCNC: 4.8 MG/DL (ref 2.3–4.1)
POTASSIUM SERPL-SCNC: 4.5 MMOL/L (ref 3.5–5.3)
PROTHROMBIN TIME: 19.3 SECONDS (ref 11.6–14.5)
SODIUM SERPL-SCNC: 140 MMOL/L (ref 136–145)

## 2020-01-07 PROCEDURE — 80048 BASIC METABOLIC PNL TOTAL CA: CPT | Performed by: NURSE PRACTITIONER

## 2020-01-07 PROCEDURE — 84100 ASSAY OF PHOSPHORUS: CPT | Performed by: NURSE PRACTITIONER

## 2020-01-07 PROCEDURE — 82948 REAGENT STRIP/BLOOD GLUCOSE: CPT

## 2020-01-07 PROCEDURE — 97530 THERAPEUTIC ACTIVITIES: CPT

## 2020-01-07 PROCEDURE — 85610 PROTHROMBIN TIME: CPT | Performed by: PHYSICIAN ASSISTANT

## 2020-01-07 PROCEDURE — 99024 POSTOP FOLLOW-UP VISIT: CPT | Performed by: THORACIC SURGERY (CARDIOTHORACIC VASCULAR SURGERY)

## 2020-01-07 PROCEDURE — 99232 SBSQ HOSP IP/OBS MODERATE 35: CPT | Performed by: INTERNAL MEDICINE

## 2020-01-07 RX ORDER — WARFARIN SODIUM 2.5 MG/1
TABLET ORAL
Qty: 90 TABLET | Refills: 0 | Status: SHIPPED | OUTPATIENT
Start: 2020-01-07 | End: 2020-04-01 | Stop reason: SDUPTHER

## 2020-01-07 RX ORDER — POLYETHYLENE GLYCOL 3350 17 G/17G
17 POWDER, FOR SOLUTION ORAL DAILY
Qty: 510 G | Refills: 0 | Status: SHIPPED | OUTPATIENT
Start: 2020-01-07 | End: 2020-06-24

## 2020-01-07 RX ORDER — WARFARIN SODIUM 1 MG/1
3 TABLET ORAL
Status: DISCONTINUED | OUTPATIENT
Start: 2020-01-07 | End: 2020-01-07 | Stop reason: HOSPADM

## 2020-01-07 RX ORDER — ATORVASTATIN CALCIUM 40 MG/1
40 TABLET, FILM COATED ORAL DAILY
Qty: 90 TABLET | Refills: 0 | Status: SHIPPED | OUTPATIENT
Start: 2020-01-07 | End: 2020-06-03 | Stop reason: SDUPTHER

## 2020-01-07 RX ORDER — OXYCODONE HYDROCHLORIDE 5 MG/1
TABLET ORAL
Qty: 30 TABLET | Refills: 0 | Status: SHIPPED | OUTPATIENT
Start: 2020-01-07 | End: 2020-02-11

## 2020-01-07 RX ORDER — MIDODRINE HYDROCHLORIDE 5 MG/1
2.5 TABLET ORAL
Status: DISCONTINUED | OUTPATIENT
Start: 2020-01-07 | End: 2020-01-07 | Stop reason: HOSPADM

## 2020-01-07 RX ORDER — ACETAMINOPHEN 325 MG/1
TABLET ORAL
Qty: 90 TABLET | Refills: 0 | Status: SHIPPED | OUTPATIENT
Start: 2020-01-07 | End: 2021-01-01 | Stop reason: ALTCHOICE

## 2020-01-07 RX ORDER — ASPIRIN 81 MG/1
81 TABLET, CHEWABLE ORAL DAILY
Qty: 90 TABLET | Refills: 0 | Status: SHIPPED | OUTPATIENT
Start: 2020-01-07 | End: 2020-04-17

## 2020-01-07 RX ADMIN — SENNOSIDES AND DOCUSATE SODIUM 1 TABLET: 8.6; 5 TABLET ORAL at 13:05

## 2020-01-07 RX ADMIN — MIDODRINE HYDROCHLORIDE 2.5 MG: 5 TABLET ORAL at 11:41

## 2020-01-07 RX ADMIN — ASPIRIN 81 MG 81 MG: 81 TABLET ORAL at 13:05

## 2020-01-07 RX ADMIN — METOPROLOL TARTRATE 12.5 MG: 25 TABLET ORAL at 13:05

## 2020-01-07 RX ADMIN — TEMAZEPAM 15 MG: 15 CAPSULE ORAL at 00:35

## 2020-01-07 RX ADMIN — POLYETHYLENE GLYCOL 3350 17 G: 17 POWDER, FOR SOLUTION ORAL at 13:05

## 2020-01-07 RX ADMIN — ONDANSETRON 4 MG: 2 INJECTION INTRAMUSCULAR; INTRAVENOUS at 06:11

## 2020-01-07 RX ADMIN — GUAIFENESIN 600 MG: 600 TABLET, EXTENDED RELEASE ORAL at 13:05

## 2020-01-07 RX ADMIN — MIDODRINE HYDROCHLORIDE 5 MG: 5 TABLET ORAL at 06:11

## 2020-01-07 RX ADMIN — HEPARIN SODIUM 2000 UNITS: 1000 INJECTION INTRAVENOUS; SUBCUTANEOUS at 09:04

## 2020-01-07 RX ADMIN — PANTOPRAZOLE SODIUM 40 MG: 40 TABLET, DELAYED RELEASE ORAL at 13:05

## 2020-01-07 NOTE — PROGRESS NOTES
Follow up Consultation    Nephrology   [facility] 70 [facility] MRN: [id number]  Unit/Bed#: [facility] 412-01 Encounter: [id number]      Physician Requesting Consult: Jordin Burgos DO  Reason for Consult:  Acute kidney injury    ASSESSMENT/PLAN:  1)Acute kidney injury (POA):  - AFSHIN most likely secondary to JED/postop ATN  - has been dialysis dependent since 12/23/2019  - After review of records it appears that the patient has a baseline Creatinine of 1 4-1 8mg/dL  - patient's creatinine today is at 4 91 mg/dL, improved from yesterday after short HD treatment     - clinically patient appears to be minimally hypervolemic  - Avoid nephrotoxins, adjust meds to appropriate GFR   - Acid base and lytes stable  - doing HD today with UF goal of 1 5 kilos as tolerated maintaining map over 65  - patient has outpatient dialysis set up for [address] at [facility] chair time 10:45 a m  Stable for discharge from renal standpoint after treatment today  - Optimize hemodynamic status to avoid delay in renal recovery  - check BMP, magnesium, phosphorus in a m   - a trophic right kidney, not a candidate for renal biopsy  - prior workup this hospitalization done by my colleague Ramón Rodriguez revealed low C3 rest of GN workup all negative  Immunofluorescence on 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/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, SPEP positive for monoclonality  - urine proteinuria quantification not available  UA with no protein noted on dipstick  - Await renal recovery  - Place on a renal diet when allowed diet order  Strict I/O  - patient follows up with Dr Rob Nava for nephrology as an outpatient     2)Blood pressure:  - Optimize hemodynamics   - Maintain MAP > 65mmHg  - Avoid BP fluctuations    - improved now on midodrine 5 mg p o  T i d   - also on metoprolol     3)H/H/anemia:  - most recent hemoglobin at 8 6 grams/deciliter  - maintain hemoglobin greater than 8 grams/deciliter  - Management as per primary team      4)Volume status:  - Clinically patient appears to be minimally hypervolemic   - aim for UF close to 1 5 kilos today      5)Hyperkalemia:  - Likely due to decreased distal delivery  - Low potassium diet  - potassium stable and improved today to 4 5     6)Hyperphosphatemia:  - Likely due to decreased renal clearance  - Continue to monitor for now  - most recent phosphorus level of 4 8 stable     7) abnormal immunofixation and SPEP/IgG lambda:  - in light of patient's atrophic right kidney would not plan on renal biopsy  - recommend to arrange for patient to be seen by Heme-Onc for possible bone marrow biopsy in further evaluation  This was discussed with the patient and will have the primary team arrange for this  - at this time less likely for this to be accounting for the acute kidney injury, likely may have underlying monoclonal gammopathy of unknown significance  - no urine protein quantification available at this time  Thanks for the consult  Will continue to follow  Please call with questions/ concerns  Above-mentioned orders and Plan in terms of acute kidney injury was discussed with the team in [address] [facility] 9:44 AM              Objective :   Patient seen and examined while on hemodialysis at 9:45 a m  Hemodynamically stable tolerating dialysis well flow working well at 400, UF goal at 1 5 kilos with dialysis treatment today  Getting discharged today  No overnight events  Remains afebrile  Catheter worked well yesterday  PHYSICAL EXAM  /83   Pulse 75   Temp 97 7 °F (36 5 °C) (Oral)   Resp 18   Ht 5' 9" (1 753 m)   Wt 80 2 kg (176 lb 12 9 oz)   SpO2 91%   BMI 26 11 kg/m²   Temp (24hrs), Av °F (36 7 °C), Min:97 5 °F (36 4 °C), Max:98 4 °F (36 9 °C)        Intake/Output Summary (Last 24 hours) at 2020 0944  Last data filed at 2020 0820  Gross per 24 hour   Intake 1410 ml   Output 650 ml   Net 760 ml       I/O last 24 hours:   In: 3403 [P O :710; I V :700]  Out: 650 [Urine:650]      Current Weight: Weight - Scale: 80 2 kg (176 lb 12 9 oz)  First Weight: Weight - Scale: 79 4 kg (175 lb)  Physical Exam   Constitutional: He is oriented to person, place, and time  He appears well-developed and well-nourished  No distress  HENT:   Head: Normocephalic and atraumatic  Mouth/Throat: Oropharynx is clear and moist  No oropharyngeal exudate  Eyes: Conjunctivae are normal  No scleral icterus  Neck: Neck supple  No tracheal deviation present  No thyromegaly present  Right IJ PermCath   Cardiovascular: Normal heart sounds  Exam reveals no friction rub  Sternal scar   Pulmonary/Chest: Effort normal  He has no wheezes  He has no rales  Abdominal: Soft  He exhibits no mass  There is no tenderness  Musculoskeletal: He exhibits no edema  Neurological: He is alert and oriented to person, place, and time  Skin: Skin is warm  He is not diaphoretic  No pallor  Psychiatric: He has a normal mood and affect  His behavior is normal    Nursing note and vitals reviewed  Review of Systems   Constitutional: Negative for appetite change, chills, fatigue and fever  HENT: Negative for congestion and sore throat  Respiratory: Negative for cough, shortness of breath and wheezing  Cardiovascular: Negative for chest pain, palpitations and leg swelling  Gastrointestinal: Negative for abdominal pain, constipation, diarrhea, nausea and vomiting  Musculoskeletal: Negative for back pain  Skin: Positive for wound  Negative for rash  Neurological: Negative for dizziness, light-headedness and headaches  Psychiatric/Behavioral: Negative for agitation and confusion  All other systems reviewed and are negative        Scheduled Meds:  Current Facility-Administered Medications:  acetaminophen 650 mg Oral Q4H PRN Patricia Gordon PA-C   albuterol 2 5 mg Nebulization Q6H PRN Kathryn Bass DO   aspirin 81 mg Oral Daily Sumaya Perry PA-C   bisacodyl 10 mg Rectal Daily PRN Yolette Chacon PA-C   guaiFENesin 600 mg Oral Q12H [address] Sumaya Perry PA-C   heparin (porcine) 2,000 Units Intravenous After Dialysis Sun Vyas,    iohexol 50 mL Oral 90 min pre-procedure Abdirizak Vu PA-C   menthol-methyl salicylate  Apply externally 4x Daily PRN Ann-Marie Hdez PA-C   metoclopramide 5 mg Intravenous Q8H PRN Foreign Mcgarry PA-C   metoprolol tartrate 12 5 mg Oral Q12H [address] Shania Barton MD   midodrine 2 5 mg Oral TID AC Dieter Gaviria PA-C   ondansetron 4 mg Intravenous Q6H Foreign Mcgarry PA-C   oxyCODONE-acetaminophen 1 tablet Oral Q4H PRN Yolette Chacon PA-C   oxyCODONE-acetaminophen 2 tablet Oral Q6H PRN Elsie Linder PA-C   pantoprazole 40 mg Oral Daily Alex Linder PA-C   polyethylene glycol 17 g Oral Daily Alex Sullivan PA-C   senna-docusate sodium 1 tablet Oral BID Foreign Mcgarry PA-C   temazepam 15 mg Oral HS PRN Yolette Chacon PA-C   warfarin 3 mg Oral Once (warfarin) Jon Martin PA-C       PRN Meds:   acetaminophen    albuterol    bisacodyl    heparin (porcine)    menthol-methyl salicylate    metoclopramide    oxyCODONE-acetaminophen    oxyCODONE-acetaminophen    temazepam    Continuous Infusions:       Invasive Devices:      Invasive Devices     Peripheral Intravenous Line            Peripheral IV 01/05/20 Left Antecubital 2 days          Hemodialysis Catheter            HD Permanent Double Catheter 1 day                  LABORATORY:    Results from last 7 days   Lab Units 01/07/20  0602 01/06/20  0542 01/05/20  0445 01/04/20  0557 01/03/20  0444 01/02/20  0434 01/01/20  0553   WBC Thousand/uL  --  11 88* 12 41* 14 06* 16 54* 18 33* 19 09*   HEMOGLOBIN g/dL  --  8 6* 7 7* 8 4* 9 3* 8 4* 9 0*   HEMATOCRIT %  --  27 0* 25 1* 26 6* 30 2* 27 1* 28 5*   PLATELETS Thousands/uL  --  286 229 235 240 251 216   POTASSIUM mmol/L 4 5 5 4* 5 2 5 1 4 4 4 3 4 4   CHLORIDE mmol/L 106 106 104 103 103 107 104   CO2 mmol/L 29 29 27 28 27 25 27   BUN mg/dL 59* 77* 65* 64* 48* 84* 68*   CREATININE mg/dL 4 91* 5 97* 5 30* 5 25* 4 40* 5 90* 5 91*   CALCIUM mg/dL 8 2* 8 0* 7 9* 8 2* 8 2* 8 1* 8 0*   PHOSPHORUS mg/dL 4 8*  --   --   --  5 0* 5 8*  --       rest all reviewed    RADIOLOGY:  IR permacath exchange   Final Result by Uma Nava MD (01/06 1522)   Technically successful exchange and repositioning of a tunneled dialysis catheter  Workstation performed: [id number]         IR permacath placement   Final Result by Uma Nava MD (01/02 1717)   Impression:    Successful removal of an indwelling right neck nontunneled dialysis catheter  Successful ultrasound and fluoroscopically guided placement of a 14 [place] tunneled dialysis via the right internal jugular vein  The tip of the catheter is in the right atrium and may be used immediately  Workstation performed: [id number]         US retroperitoneal complete   Final Result by Alejandra Diez MD (12/27 2002)      No hydronephrosis  Asymmetric atrophy of the right kidney  Small left renal cysts  The bladder is decompressed and poorly evaluated  Workstation performed: [id number]         XR chest pa & lateral   Final Result by Lizzie Melchor MD (12/27 1659)      Small left pleural effusion, decreased since thoracentesis, without pneumothorax  Workstation performed: [id number]         CT chest abdomen pelvis w contrast   Final Result by Alejandra Diez MD (12/27 1857)      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  Persistent intramural hematoma extending from the aortic root to the level of the distal descending aorta, measuring up to 8mm in thickness  Recent median sternotomy with 1 0 cm bony diastasis  Fluid collection in the retrosternal space measuring 5 2 x 2 3 x 10 5 cm could be postsurgical but superimposed infection cannot be excluded  Trace right and small left pleural effusions        Dense bibasilar consolidation representing atelectasis or aspiration  Nodular liver contour suggesting cirrhosis  Workstation performed: [id number]         IR thoracentesis   Final Result by Nichelle Narvaez MD (12/27 1611)   Technically successful ultrasound-guided thoracentesis  Workstation performed: [id number]         XR chest pa & lateral   Final Result by Albertina Natarajan MD (12/26 1509)      Status post thoracentesis, with diminished size of left effusion and no evidence of pneumothorax            Workstation performed: [id number]         IR thoracentesis   Final Result by Abdulkadir Rodrigues MD (12/24 1622)   Impression:    Successful ultrasound-guided left thoracentesis  Workstation performed: [id number]         XR chest pa & lateral   Final Result by Alberitna Natarajan MD (12/24 1433)      Increased size of left pleural effusion/underlying left basilar atelectasis or infiltrate  Workstation performed: [id number]         IR temp HD cath   Final Result by Tee De La Cruz MD (12/23 1919)   Impression:   Ultrasound and fluoroscopically guided placement of a 20 cm 14-Nepalese nontunneled dialysis catheter via the right internal jugular vein  Catheter is ready for immediate use  Workstation performed: [id number]         XR chest pa & lateral   Final Result by Leni Zepeda MD (12/20 7536)   Persistent small left basilar pleural effusion with lungs otherwise clear  Workstation performed: [id number]         XR chest portable   Final Result by Carver Gowers, MD (46/39 4884)      Extubation  Mild bibasilar atelectasis  Expected appearance after cardiac surgery  Workstation performed: [id number]         XR chest portable ICU   Final Result by Jamel Valadez MD (92/37 6491)      1  Status post sternotomy  Lines and support devices appear in position as noted above  Central fullness of the pulmonary vasculature may represent mild pulmonary vascular congestion              Workstation performed: [id number] CTA dissection protocol chest abdomen pelvis w wo contrast   Final Result by Norma Moody MD (12/14 4540)      1  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 tapering to 4 1 cm at the aortic arch  2   There is crescentic intermediate density along the aortic wall extending from the aortic root through the aortic arch and along the proximal descending thoracic aorta compatible with acute type A intramural hematoma  Emergent vascular consultation    is recommended  I personally discussed this study with Ken Miner on 12/14/2019 at 10:57 PM                   Workstation performed: [id number]         XR chest 2 views   Final Result by Allyn Corona MD (12/15 2420)      Cardiomegaly   No acute consolidation or congestion            Workstation performed: [id number]           Rest all reviewed    Portions of the record may have been created with voice recognition software  Occasional wrong word or "sound a like" substitutions may have occurred due to the inherent limitations of voice recognition software  Read the chart carefully and recognize, using context, where substitutions have occurred  If you have any questions, please contact the dictating provider

## 2020-01-07 NOTE — PHYSICAL THERAPY NOTE
Physical Therapy Cancellation Note    The pt  Is off of the floor at hemodialysis  Will re-attempt later as able      Dayna Mays, PTA

## 2020-01-07 NOTE — PHYSICAL THERAPY NOTE
Physical Therapy Progress Note     01/07/20 1210   Pain Assessment   Pain Assessment No/denies pain   Pain Score No Pain   Restrictions/Precautions   Other Precautions Cardiac/sternal   Subjective   Subjective The pt  states that he is eager to return home today  Activity Tolerance   Activity Tolerance Patient tolerated treatment well   Nurse [address] Kinjal Stein, RN  Assessment   Prognosis Good   Problem List Decreased endurance; Impaired balance;Decreased mobility   Assessment The pt  was educated in the importance of continued mobility at home as well as his cardiac precautions  Addressed his and his family's questions about returning home  Will continue to follow  Barriers to Discharge None   Goals   Patient Goals To go home  STG Expiration Date 01/10/20   PT Treatment Day 7   Plan   Treatment/Interventions Functional transfer training;LE strengthening/ROM; Elevations; Therapeutic exercise; Endurance training;Patient/family training;Bed mobility;Gait training   Progress Progressing toward goals   PT Frequency   (4-6x a week )   Recommendation   Recommendation Home PT; Home with family support   Equipment Recommended [facility] Finley   PT - OK to Discharge Yes     Michi Vega, PTA

## 2020-01-07 NOTE — HEMODIALYSIS
Stable 3 5hr dialysis tx  Adequate flow from catheter  2000ml uf removed  Pre wt 80 6kg, post 79kg, -1 6kg

## 2020-01-07 NOTE — DISCHARGE INSTRUCTIONS
Perma-cath Placement   WHAT YOU NEED TO KNOW:   A perma-cath is a catheter placed through a vein into or near your right atrium  Your right atrium is the right upper chamber of your heart  A perma-cath is used for dialysis in an emergency or until a long-term device is ready to use  After your procedure, you will have some pain and swelling on your chest and neck  You may have some bruises on your chest and neck  You may also have 2 dressings, one on your chest and one on your neck  DISCHARGE INSTRUCTIONS:   Call 911 for any of the following:   · You feel lightheaded, short of breath, and have chest pain  · Your catheter comes out   Contact Interventional Radiology at [phone] [place] PATIENTS: Contact Interventional Radiology at [phone]) [place] PATIENTS: Contact Interventional Radiology at [phone]) if:  · Blood soaks through your bandage  · You have new swelling in your arm, neck, face, or chest on your right side  · Your catheter gets wet  · Your bruises or pain get worse  · You have a fever or chills  · Persistent nausea or vomiting  · Your incision is red, swollen, or draining pus  · You have questions or concerns about your condition or care  Self-care:       · Resume your normal diet  · Keep your dressings dry  Do not take a shower or swim  You may take a tub bath, but do not get your dressings wet  Water in your wound can cause bacteria to grow and cause an infection  If your dressing gets wet, dry it off and cover it with dry sterile gauze  Call your healthcare provider  Do not use soaps or ointments  · Do not change your dressings  Your healthcare provider or dialysis nurse will change your dressings  Your dressings should stay in place until your healthcare provider removes them  The dressing on your chest will stay as long as you have the catheter in place  The dressing prevents infection  · Do not remove the red and blue caps from the end of your catheter   The caps prevent air from getting into your catheter  Follow up with your healthcare provider as directed: Write down your questions so you remember to ask them during your visits        Medidas de precaución para el esternón   LO QUE NECESITA SABER:   ¿Qué son Leslie Evens precaución para [place]? Las medidas de precaución para el esternón se usan para ayudar a proteger paez esternón después de [place]  Se colocan unos cables jon la cirugía para mantener unido al esternón [employer]  Las medidas de precaución ayudan a prevenir que los cables corten el esternón  Las precauciones también ayudan a prevenir que el esternón se desarme debido a patric lesión y previenen el dolor y el sangrado  Es probable que usted [employer] usar las medidas por hasta 12 semanas después de la [place]  Paez cirujano le dará indicaciones específicas basadas en el tipo de cirugía que usted [place]  Es importante seguir las indicaciones al pie de la [place]  Reeda Begum al esternón mientras ned, puede ser mortal   Jg Rolls son Esmer Sy de las precauciones generales para el esternón? Empiece lentamente y [address] a medida que se sienta más frantz  El medicamento para el dolor podría dificultar darse cuenta de cuándo bajar la actividad o Agia Thekla cuidado  Pare de inmediato si usted escucha un leidy crujiente o algo que se revienta en paez esternón  · Proteja paez esternón  Abrace patric almohada contra paez pecho o cruce es brazos sobre paez pecho cuando se ría a carcajadas, estornude o tosa  · Tenga cuidado cuando vaya a sentarse o ponerse de pie en patric silla o de la cama  Abrace patric almohada o cruce es brazos cuando se pone de pie o se siente  No gire cuando se mueva Use sólo es piernas para sentarse o ponerse de pie  Es probable que necesite usar un asiento elevado para el inodoro si tiene dificultad para ponerse de pie sin usar los [place]   Es probable también que paez médico le enseñe a usar paez codo para apoyarse cuando usted pase de acostado a sentado  · Pregunte cuándo se puede duchar o bañar en heide  Es probable que tenga que usar patric silla para el baño si tiene dificultad para entrar y salir de [address]  No use patric gabriel de apoyo  · No levante ni cargue nada que pese más de 10 libras  Por ejemplo, un galón de leche pesa 8 libras  · No empuje ni jale nada  Ejemplos incluyen patric garcía de un winter o Mele Sees  · No maneje mientras está aún curándose  Paez cirujano le dirá cuando es seguro empezar a manejar otra vez  ¿Cómo cuido de la herida de mi cirugía? Lávese siempre las autumn antes de cuidar de paez herida  Lávese la herida según indicaciones  No se enjuague la herida mientras lava o seca el [place]  Seque el área dando palmadas con patric toalla limpia  Cambie las vendas según indicaciones y siempre que se mojen o ensucien  No fume:  La nicotina puede dañar los vasos sanguíneos y dificultar la recuperación  No use cigarrillos electrónicos o tabaco sin humo en vez de cigarrillos o para tratar de dejar de fumar  Todos estos aún contienen nicotina  Pida a paez médico información si usted fuma actualmente y [place] para dejar de hacerlo  Llame al 911 en dario de presentar lo siguiente:   · Usted tiene dolor repentino en el esternón y oye un leidy crujiente o algo que se revienta  · Usted tiene sangrado que no para aún después de aplicar presión por 5 minutos  ¿Cuándo clay buscar atención inmediata? · Usted escucha un leidy crujiente o efren un chirrido en paez esternón  · Usted tiene signos de infección, efren fiebre, piel thom o tibia o pus en la herida de la cirugía  ¿Cuándo clay comunicarme con mi médico?   · Usted continúa sintiendo dolor uncluso después de tamiko paez medicamento para el dolor  · Usted siente un dolor nuevo o empeoramiento de paez dolor, o cualquier tipo de dolor cuando se [place]  · Usted tiene preguntas o inquietudes acerca de paez condición o cuidado    ACUERDOS SOBRE PAEZ CUIDADO: Usted tiene el derecho de ayudar a planear patrick cuidado  Aprenda todo lo que pueda sobre patrick condición y efren darle tratamiento  Discuta es opciones de tratamiento con es médicos para decidir el cuidado que usted desea recibir  Usted siempre tiene el derecho de rechazar el tratamiento  Esta información es sólo para uso en educación  Patrick intención no es darle un consejo médico sobre enfermedades o tratamientos  Colsulte con patrick Ronalee Mon farmacéutico antes de seguir cualquier régimen médico para saber si es seguro y efectivo para usted  © 2017 [address] Information is for End User's use only and may not be sold, redistributed or otherwise used for commercial purposes  All illustrations and images included in CareNotes® are the copyrighted property of A D A M  Inc  or Orion Castillo  Dieta saludable para el corazón   LO QUE NECESITA SABER:   ¿Qué es patric dieta saludable para [facility]? [place] ned para el corazón es un plan alimenticio bajo en grasas totales, grasas no saludables y sodio (brigid)  [place] saludable para el corazón ayuda a disminuir patrick riesgo de sufrir de enfermedades del Onalee Noble y un derrame cerebral  Limite la cantidad de grasa que consume entre un 25% a 35% del total de es calorías diarias  Limite el sodio por debajo de los 2,300 mg al día  ¿Qué es la grasa saludable y dónde se encuentra? Las grasas saludables ayudan a mejorar los niveles de [place]  El riesgo de patric enfermedad cardíaca se disminuye cuando los niveles de colesterol son normales  Escoja grasas saludables efren las siguientes:  · Las grasas no saturadas  se encuentran en alimentos efren el frijol de soja, aceites de canola, de [place], de [place] y de [place]  Se encuentra también en la margarina suave hecha con aceite líquido vegetal      · Las grasas Omega 3  se encuentran en ciertos pescados, efren el salmón, el atún y la [place], en las nueces y en la semilla de mckenzie    ¿Qué es patric grasa perjudicial y dónde se encuentra? Las grasas "malas" pueden causar niveles perjudiciales de colesterol en paez emmy y aumentar el riesgo de patric enfermedad cardíaca  Limite el consumo de grasas no saludables, efren los siguientes:  · El colesterol  se encuentra en alimentos de origen animal, efren los huevos y la langosta al igual que en productos lácteos hechos con leche entera  Limite el consumo de colesterol a menos de 300 milligramos (mg) al día  Es posible que necesite limitar el colesterol a 200 mg al día si sufre de patric enfermedad cardíaca  · Las grasas saturadas  se encuentran en layla efren el tocino y la hamburguesa  Estas se encuentran también en la piel del lane y del [place], [place] entera y [facility] york  Limite el consumo de grasas saturadas a menos del 7% del total de es calorías diarias  Limite las grasas saturadas a menos del 6% si usted tiene enfermedad cardíaca o tiene un mayor riesgo para esto  · La grasa trans  se encuentran en los alimentos envasados, efren las papitas de bolsa y las [place]  También se encuentra en la margarina dura, algunos alimentos fritos y la manteca  Evite lo más que [employer] trans  ¿Qué alimentos o bebidas puedo consumir en patric dieta saludable para el corazón?   Solicite que paez nutricionista o el médico le indique cuántas porciones necesita consumir de los siguientes alimentos de cada margy alimenticio:  · Granos:      ¨ Panes, cereales y pastas de [place] integral y Robert Heraclio integral    ¨ Patatas fritas y galletas saladas bajas en grasa, bajas en sodio    · Verduras:      ¨ henny Zhengías verdes, guisantes y espinacas    ¨ [place], col y habas    ¨ Zanahorias, patatas [place], tomates y pimientos    ¨ Vegetales enlatados sin brigid añadida    · Frutas:      ¨ Plátanos, melocotones, peras y bryant    ¨ Uvas, pasas y dátiles    ¨ Isma Jordan Tiszafüred, Anant [facility] y Melvin[facility]    ¨ [place], [place], melón y papaya    [place] y fresas    ¨ Conservas de frutas sin azúcares añadidos    · Productos lácteos bajos en grasa:      ¨ Leche sin grasa (descremada), leche 1%, leche baja en grasa de [place], anacardo o leche de soja fortificada con calcio    ¨ Queso cottage, queso bajo en grasa y yogur regular o congelado    · Rebeccaside y proteínas , david de carne [place] de res o cerdo (chuletas, pierna, justin), el lane y el pavo sin piel, legumbres, productos de soya, las claras del huevo y nueces o swati secos  ¿Cuáles alimentos o bebidas necesito limitar o evitar? Pregúntele a paez médico o nutricionista sobre estos y otros alimentos que contienen altos niveles de sodio, azúcar y [place] que no son saludables:  · [employer] , efren las comidas congeladas, [place], macarrón con queso y cereales con más de 300 mg de sodio por porción    · Productos enlatados o mezclas secas  para pasteles, sopas o salsas    · Verduras con sodio agregada , efren las maria esther instantáneas, verduras con salsas agregadas o conservas regulares de verduras    · Otros alimentos altos en sodio , efren la salsa de [place], salsa de [facility], aderezo para [place], encurtidos de [place], [facility], salsa de soya y miso    · Alimentos lácteos con alto contenido de grasa  efren leche entera o 2%, queso crema o crema agria y quesos     · Alimentos con proteínas altas en grasa  david de carne ([address], las chuletas con hueso), el lane o pavo sin piel y las vísceras de animal efren el hígado    · Sheldon curadas o [place] , efren las salchichas, el tocino y salchichones    · Aceites y grasas poco saludables , efren la [place], margarina en Early Stagger y aceites para cocinar efren el aceite de piter o concepcion    · Alimentos y bebidas altas en azúcar , tales efren refrescos (gaseosas), bebidas deportivas, té azucarado, dulces, pasteles, galletas, tartas y donas  ¿Qué otras pautas para la dieta clay seguir? · Consuma más alimentos que contengan grasas Omega-3    Consuma pescado con altas cantidades de Omega-3 por lo menos 2 veces a la semana  · Limite el consumo de alcohol  Demasiado alcohol puede dañar paez corazón y elevar paez presión arterial  Las mujeres deberían limitar el consumo de alcohol a 1 bebida por día  Los hombres deberían limitar el consumo de alcohol a 2 tragos al día  Un trago equivale a 12 onzas de cerveza, 5 onzas de vino o 1 onza y ½ de licor  · Escoja alimentos bajos en sodio  Alimentos altos de sodio pueden conducir a la hipertensión  Al preparar la comida añada muy poca sal o no use sal  Use hierbas y especias en vez de la sal     · Consuma más fibra  para ayudar a bajar los niveles de [place]  Consuma al menos 5 porciones de frutas y verduras todos los días  Consuma 3 onzas de alimentos integrales al día  Mort Gaster (fríjoles) son Casimiro Gary buena anisa de [place]  ¿Qué pautas de estilo de alda debería seguir? · No fume  La nicotina y otros químicos contenidos en los cigarrillos y cigarros pueden causar daño a es pulmones y el corazón  Pida información a paez médico si usted actualmente fuma y necesita ayuda para dejar de fumar  Los cigarrillos electrónicos o tabaco sin humo todavía contienen nicotina  Consulte con paez médico antes de [employer]  · Sharyon Mary Ellen regularmente  para que le ayude a mantener un peso saludable y mejorar paez presión arterial y niveles de colesterol  Pregunte a paez médico acerca del mejor plan de ejercicio para usted  No empiece un programa de ejercicios sin antes consultar con paez médico    ACUERDOS SOBRE PAEZ CUIDADO:   Usted tiene el derecho de ayudar a planear paez cuidado  Discuta es opciones de tratamiento con es médicos para decidir el cuidado que usted desea recibir  Usted siempre tiene el derecho de rechazar el tratamiento  Esta información es sólo para uso en educación  Paez intención no es darle un consejo médico sobre enfermedades o tratamientos   Colsulte con paez médico, enfermera o farmacéutico antes de seguir cualquier régimen médico para saber si es seguro y efectivo para usted  © 2017 [address] Mol[address] Information is for End User's use only and may not be sold, redistributed or otherwise used for commercial purposes  All illustrations and images included in CareNotes® are the copyrighted property of A D A M , Inc  or Orion Castillo  El control del dolor con narcóticos   LO QUE NECESITA SABER:   ¿Qué necesito saber acerca de los narcóticos? Un narcótico es un tipo de medicamento que se [place] para tratar el dolor  Ejemplos de narcóticos son la codeína, la oxicodona y el fentanilo  ¿Por qué es importante el control del dolor? El dolor puede provocar cambios en la kendrick física y [place], efren problemas de depresión y de insomnio  Controlar y manejar el dolor podrían ayudarlo a descansar, sanar y regresar a es actividades diarias  ¿Cuáles son los efectos secundarios de los medicamentos narcóticos? El estreñimiento es el efecto secundario más común  Joy más líquidos y coma alimentos ricos en fibra para ayudar a prevenir el estreñimiento  Pregúntele a paez médico qué líquidos son adecuados para usted y cuánto debe beber  Pida patric lista de los alimentos que contienen [place]  Los otros efectos secundarios incluyen náuseas, somnolencia y [place]  Puede que necesite radha paez medicamento narcótico con alimentos para disminuir la náusea  Pregúntele a paez médico sobre otras maneras de [employer] secundarios  ¿Por qué es importante radha los medicamentos narcóticos según lo indicado? · Pueden ocurrir problemas de kendrick efren  dificultad para respirar, daño hepático o renal o sangrado estomacal  Cualquiera de estos problemas pueden representar Caty Stagers para la alda  · El acetaminofeno o el ibuprofeno  pueden incluirse entre algunos medicamentos narcóticos   Radha demasiado de estos medicamentos puede causar daño en el hígado o riñón o sangrado estomacal  Estos problemas pueden poner en peligro paez alda  · Dependencia  significa que paez cuerpo necesita el medicamento para evitar el síntoma de abstinencia  · Tolerancia  significa que el medicamento no controla el dolor tan josie efren antes  Necesita dosis más altas del medicamento para aliviar el dolor  · Adicción  significa que usted no es capaz de controlar el uso del medicamento  Lo utiliza cuando no tiene dolor y tienes necesidad de tamiko el medicamento  ¿Qué necesito saber acerca de la seguridad de los narcóticos? · [place] es medicamentos efren se le haya indicado  Pregunte si usted necesita más información acerca de cómo tomarse paez medicamento correctamente  Programe citas con paez médico regularmente  Es posible que necesite que le ajusten paez dosis  No use gina medicamento si está embarazada o amamantando  Los medicamentos narcóticos pueden transmitirse a paez bebé a través de la emmy y la Yvonne  · Tom a paez médico patric lista de todos es medicamentos  Incluya cualquier medicamento de venta sin receta, vitaminas y hierbas  Puede ser peligroso tamiko narcóticos junto con otros medicamentos determinados, [employer]  · Mantenga el medicamento en un lugar seguro  Almacene paez medicamento narcótico en un gabinete con seguro para mantenerlo fuera del alcance de los niños y de otras personas  · No consuma alcohol mientras [place] narcóticos  El alcohol mezclado con un narcótico puede causarle somnolencia y disminuir el ritmo respiratorio  Es posible que deje de respirar completamente  · No conduzca ni maneje maquinaria pesada después de tamiko medicamentos narcóticos  Los medicamentos narcóticos pueden causar somnolencia y hacen que sea difícil concentrarse  Puede provocar daños en usted o en otras personas si conduce o maneja maquinaria pesada mientras nirav paez medicamento    Llame al 911 o oscar que alguien llame al 911 en cualquiera de los siguientes casos:   · Usted está respirando más lento de lo normal, o tiene dificultad para respirar  · No es posible despertarlo  · Usted sufre patric convulsión  ¿Cuándo clay buscar atención inmediata? · Paez corazón late más lento de lo normal     · Usted siente que paez corazón se le va a salir del pecho o siente palpitaciones  · Usted tiene dificultad para mantenerse despierto  · Usted tiene dolor o debilidad muscular intensos  · Usted ve o escucha cosas que no son reales  ¿Cuándo clay comunicarme con mi médico?   · Usted está demasiado mareado para ponerse de pie  · Paez dolor empeora o tiene un nuevo dolor  · El dolor no desaparece después de que usted nirav el medicamento  · No puede hacer es actividades cotidianas debido a los efectos secundarios del narcótico      · Usted tiene estreñimiento o dolor abdominal     · Usted tiene preguntas o inquietudes acerca de paez condición o cuidado  ACUERDOS SOBRE PAEZ CUIDADO:   Usted tiene el derecho de ayudar a planear paez cuidado  Aprenda todo lo que pueda sobre paez condición y efren darle tratamiento  Discuta es opciones de tratamiento con es médicos para decidir el cuidado que usted desea recibir  Usted siempre tiene el derecho de rechazar el tratamiento  Esta información es sólo para uso en educación  Paez intención no es darle un consejo médico sobre enfermedades o tratamientos  Colsulte con paez Ricco Chill farmacéutico antes de seguir cualquier régimen médico para saber si es seguro y efectivo para usted  © 2017 [address] Gustavo Molina Information is for End User's use only and may not be sold, redistributed or otherwise used for commercial purposes  All illustrations and images included in CareNotes® are the copyrighted property of A D A M , Inc  or Orion Jonathan  Warfarina (Por la boca)   Previene y trata los coágulos sanguíneos  Podría disminuir el riesgo de complicaciones serias después de un ataque cardíaco  Velia medicamento es un anticoagulante    Eriberto(s) : Coumadin, Jantoven   Existen muchas otras marcas de Natalio  Velia medicamento no debe ser usado cuando:   Velia medicamento no es adecuado para todas las personas  No lo use si usted ha tenido patric reacción alérgica a la warfarina, si está embarazada o si tiene problemas de kendrick que pudieran causar sangrado  Forma de usar velia medicamento:   Tableta  · [place] es medicamentos efren se le haya indicado  Es probable que sea necesario cambiar paez dosis varias veces hasta encontrar la que funciona mejor para usted  · Natalio debe venir con  Bold Guía del medicamento  Solicite patric copia con paez farmacéutico en dario de no tener la guía  · Si olvida patric dosis: Si olvida patric dosis de paez medicamento, tómelo lo más pronto posible  Si es kurt la hora para paez próxima dosis, espere hasta entonces para tamiko paez dosis regular  No use medicamento adicional para reponer la dosis olvidada  · Guarde el medicamento en un recipiente cerrado a temperatura ambiente y alejado del calor, la humedad y la chary directa  Medicamentos y Robert Tire que debe evitar:   Consulte con paez médico o farmacéutico antes de usar cualquier medicamento, incluyendo los que compra sin receta médica, las vitaminas y los productos herbales  · Hay muchos medicamentos y alimentos que pueden afectar el funcionamiento de la warfarina  y que podrían afectar los resultados de los exámenes de Antonette PT / INR   Informe a paez médico antes de empezar o dejar de tamiko cualquier medicamento, especialmente los siguientes:   ¨ Ginkgo, equinácea, sello long, o hierba de [address] anticoagulante, incluyendo apixaban, argatroban, bivalirudina, cilostazol, clopidogrel, dabigatran, desirudin, dipiridamol, heparina, lepirudina, prasugrel, rivaroxaban, ticlopidina  ¨ Medicamentos para tratar la depresión o la ansiedad, incluyendo citalopram, desvenlafaxina, duloxetina, escitalopram, fluoxetina, fluvoxamina, milnaciprán, paroxetina, sertralina, venlafaxina, vilazodona  ¨ Medicamentos para tratar patric infección  ¨ LATASHA para el dolor o medicamento para la artritis, incluyendo aspirina, celecoxib, diclofenaco, diflunisal, fenoprofeno, ibuprofeno, indometacina, ketoprofeno, ketorolaco, ácido mefenámico, naproxeno, oxaprozina, piroxicam, sulindac  Revise las [employer] de los medicamentos de venta kenya para saber si contienen un [place]  ¨ Medicamentos esteroideos, incluyendo dexametasona, hidrocortisona, metilprednisolona, prednisolona, prednisona  · La warfarina funciona mejor si usted come aproximadamente la misma cantidad de vitamina K todos los días  Los alimentos ricos en vitamina K son los espárragos, brócoli, coles de Eric, [place], verduras de hojas verdes, ciruelas, ruibarbo y aceite de canola  Consulte a paez médico antes de hacer cambios en paez dieta normal   · No coma toronja ni tome jugo de toronja mientras esté   Precauciones jon el uso de gina medicamento:   · No es seguro tamiko gina medicamento jon el Chandni Loss  Podría dañar al feto  Dígale a paez médico de inmediato si usted [place]  Use un método anticonceptivo eficaz para evitar el embarazo jon el tratamiento y jon por lo menos 1 mes después de paez última dosis  · Informe a paez médico si está amamantando o si tiene enfermedad renal, enfermedad hepática, diabetes, cardiopatía, insuficiencia cardíaca, presión arterial lefty, patric infección, patric úlcera de estómago o deficiencia de proteína C  Onnie Fraise a paez médico si usted tuvo cirugía reciente o lesión o antecedentes de accidente cerebrovascular, anemia, sangrado severo o moretones, o problemas causados por heparina utilizan    · Gina medicamento puede provocarle los siguientes problemas:   ¨ Sangrado, que puede ser mortal  ¨ Gangrena (daño en la piel o en los tejidos)  ¨ Calcifilaxis o arteriolopatía urémica calcificante  ¨ Síndrome de los dedos del pie morados  · Usted necesita un examen de Antonette PT / INR (por es siglas en inglés) mientras [place] gina medicamento para revisar lo josie que está coagulando paez [place]  Paez médico usará los [employer] del examen para asegurarse de que el medicamento esté funcionando correctamente  Mantenga todas las citas para los análisis de [place] PT / INR  · Es posible que usted emmy o tenga moretones con mayor facilidad con Leonor Six  Para evitar lesiones o cortaduras, no juegue deportes bruscos, tenga cuidado con los [employer], y use el cepillo y el hilo dental con suavidad  Sople la nariz suavemente y no se la toque [place]  · Lleve siempre patric tarjeta de identificación o un brazalete de alerta para informar a los médicos que usted [place] warfarina  · Dígale a todo médico o dentista encargado de atenderle que usted está usando [employer]  Es posible que tenga que suspender el uso de gina medicamento varios días antes de someterse a patric cirugía o exámenes médicos  · Guarde todos los medicamentos fuera del alcance de los niños  Nunca comparta es medicamentos con [employer]    Efectos secundarios que pueden presentarse jon el uso de gina medicamento:   Consulte inmediatamente con el médico si nota cualquiera de estos efectos secundarios:  · Reacción alérgica: Comezón o ronchas, hinchazón del magdalene o las autumn, hinchazón u hormigueo en la boca o garganta, opresión en el pecho, dificultad para respirar  · [employer] o la [place], tos con [place], periodos menstruales laura  · Sangrado que no se detiene, moretones o debilidad  · [place], desmayos, desvanecimiento  · Dolor, piel café o smith, o piel que se siente fría al tacto  · Pies o dedos de los pies morados, o dolor nuevo en la pierna, pie o dedos de los pies  · Manchas en la piel púrpuras, enrojecidas, y en forma de gisel  · Orina de color whitaker o marrón o evacuaciones intestinales stanley, negras o alquitranadas  · Vómito con [place] o con apariencia de café molido  Consulte con el médico si nota otros efectos secundarios que pato son causados por gina medicamento  Llame a paez médico para consultarle Jose Marks puede notificar es efectos secundarios al [facility] al [phone]  © 2017 2600 Gustavo  Information is for End User's use only and may not be sold, redistributed or otherwise used for commercial purposes  Esta información es sólo para uso en educación  Paez intención no es darle un consejo médico sobre enfermedades o tratamientos  Colsulte con paez Gregary Dings farmacéutico antes de seguir cualquier régimen médico para saber si es seguro y efectivo para usted  La vitamina K en los alimentos   LO QUE NECESITA SABER:   · La warfarina es un tipo de medicamento llamado anticoagulante  Hace que la emmy se coagule más lentamente  [place] puede ayudar a prevenir problemas peligrosos, efren un derrame cerebral (un coágulo de emmy en el cerebro)  La vitamina K facilita la coagulación de la emmy (espesa la emmy para detener el sangrado)  La warfarina le dificulta a paez cuerpo utilizar la vitamina K para coagular la emmy  Cambios en la cantidad de vitamina K que usted consume pueden afectar cómo funciona la Robin Maria Elena  · La eficacia de la warfarina se determina a través de pruebas de emmy que paez médico le hará regularmente  Esta prueba se llama cociente normalizado internacional (INR, por es siglas en inglés)  Indica la rapidez en que paez emmy se coagula  Para mantener el INR a un nivel saludable, es necesario que usted preste atención a la cantidad de vitamina K que consume  INSTRUCCIONES SOBRE EL [facility]ITAL[facility]:   Mientras tome warfarina:  Consuma la misma cantidad de vitamina K todos los días  No cambie la cantidad de vitamina K que consume habitualmente a través de los alimentos o suplementos  [place] ayuda a mantener paez INR al mismo nivel saludable  · Un gran aumento de vitamina K podría reducir paez INR    [place] puede provocar coágulos peligrosos en la emmy  · Brielle gran disminución de vitamina K podría incrementar paez INR  [place] puede crear dificultades de coagulación en la emmy  Puede hacer que usted emmy demasiado  No evite los alimentos que contienen vitamina K  Alimentos que contienen vitamina K:  Los vegetales de [employer] tienen las cantidades más altas de vitamina K  Los alimentos que contienen vitamina K incluyen los siguientes:  · Alimentos con más de 100 mcg por porción:      ¨ ½ taza de col rizada cocida (531 mcg)    ¨ ½ taza de espinaca cocida (444 mcg)    ¨ ½ taza de berza cocida (418 mcg)    ¨ 1 taza de brócoli cocido (220 mcg)    ¨ 1 taza de coles de Bruselas cocidas (219 mcg)    ¨ 1 taza col rizada cruda (184 mcg)    ¨ 1 taza de espinaca cruda (145 mcg)    ¨ 1 taza de escarola cruda (116 mcg)    · Alimentos con 50 a 100 mcg por porción:      ¨ 1 taza de brócoli crudo (89 mcg)    ¨ ½ taza de repollo cocido (82 mcg)    ¨ 1 taza de edinson de hoja nata (71 mcg)    ¨ 1 taza de edinson romana (57 mcg)    · Alimentos con 15 a 50 mcg por porción:      ¨ 4 tallos de espárragos (48 mcg)    ¨ 1 kiwi mediano (31 mcg)    ¨ 1 taza de moras o arándanos (29 mcg)    ¨ 1 taza de uvas stanley o verdes (23 mcg)    ¨ ½ taza de guisantes cocidos (19 mcg)  Otras clemons de vitamina K:  Misael Wheeler y otros suplementos podrían contener de 10 a 80 mcg de vitamina K  Estas cantidades pueden causar cambios en paez INR  Jackelin las etiquetas de los suplementos que nirav  No tome más de 1 suplemento que contenga vitamina K  Consulte con paez médico acerca de los suplementos que consume  Pregúntele a paez Keli Méndez vitaminas y minerales son adecuados para usted  · Usted tiene poco apetito  · Usted siente molestia estomacal      · Tiene pérdida de mallory  · Se le bertrand moretones más fácil de lo normal      · Tiene preguntas acerca de es medicamentos, suplementos o la cantidad de vitamina K que consume    Busque atención médica de inmediato si:   · Usted Raymon Gaw  · Tiene evacuaciones intestinales de color whitaker o geo  · Patrick orina es de color whitaker o marrón oscuro  · [address]  · Tiene sangrado abundante que proviene de patric Correne Erichsen y no se detiene, o períodos menstruales inusualmente abundantes  · Usted tiene un frantz dolor de maurisio  © 2017 2600 [employer]avo Molina Information is for End User's use only and may not be sold, redistributed or otherwise used for commercial purposes  All illustrations and images included in CareNotes® are the copyrighted property of A D A M , Inc  or Orion Castillo  Esta información es sólo para uso en educación  Patrick intención no es darle un consejo médico sobre enfermedades o tratamientos  Colsulte con patrick Chrissy Cipriano farmacéutico antes de seguir cualquier régimen médico para saber si es seguro y efectivo para usted  Catéter central sin túnel   LO QUE NECESITA SABER:   ¿Qué es patric línea central sin túnel? Un catéter central sin túnel es un tipo de catéter intravenoso  Un catéter es un tubo flexible que se [place] para administrar tratamientos y extraer emmy  Se coloca en patric vena florinda cerca del ted, pecho o dwayne  ¿Por qué necesito patric línea central sin túnel? Vinay Kayser sin túnel son usadas para la administración de medicamentos y tratamientos  Son con frecuencia usadas si usted tiene que aplicarse medicamentos por vía intravenosa en patrick hogar  Es posible que los médicos no puedan [employer] las venas más pequeñas de patrick cuerpo  En dario de patirc emergencia, patric vía central sin túnel sirve de acceso fácil y rápido a patrick torrente sanguíneo y el medicamento puede actuar con Tammi Ke  ¿Cómo puedo prevenir infecciones asociadas con el catéter? El área alrededor de patrick catéter [employer], o usted podría contraer patric infección en patrick torrente sanguíneo   Patric infección por catéter es causada por bacterias (gérmenes) que entran en patrick emmy a través del catéter  Las infecciones producidas por catéteres [place] a afecciones graves  Lo siguiente son formas que le pueden ayudar a usted a prevenir patric infección:  · [employer] las autumn:  Use jabón o un gel con alcohol para lavarse las autumn  Límpiese las [employer] antes y después de tocar el catéter o el lugar donde se colocó el catéter  Pídale a paez médico más información sobre cómo [employer]  Recuerde a todas las [employer] cuidan paez catéter de [employer]  · Use guantes médicos:  Use guantes médicos limpios cuando toca el catéter o cambia las vendas  · Limite el contacto:  No toque o manipule paez catéter a menos que necesite hacerlo para limpiarlo  No jale, empuje o mueva el catéter cuando se limpia la piel y [place]  · Limpiar paez piel:  Limpie la piel circundante al catéter [employer] lourdes y antes de cambiar el vendaje  Pregunte a paez médico qué usar para limpiar paez piel  · Revise si hay infección:  Revise paez piel diariamente para brittani si hay señales de infección, efren dolor, enrojecimiento, inflamación, y algún líquido saliendo  Comuníquese con paez médico si nota estos signos  · [address]: Mantenga un vendaje estéril sobre el sitio del catéter  Cambie el vendaje efren se lo indicaron o si está suelto, sucio, o se ha desprendido  Cambie paez vendaje en un lugar fuera del alcance de ventanas, conductos de la calefacción, y ventiladores  Asegúrese de que el lugar esté josie iluminado, limpio y [place]  Limpie la piel por debajo de la venda con la solución sugerida por paez médico  Deje que el área se seque antes de ponerse patric venda nueva  · Mantener el área seca:  No permita que se moje el catéter o el sitio del eric del catéter  Old[place] el área del catéter con plástico y séllelo con cinta adhesiva médica antes de bañarse  ¿Cómo clay cuidar de mi línea central sin túnel?   Es probable que paez médico le recomiende hacer lo siguiente para reducir el riesgo de patric infección o complicaciones:  · Limpie las piezas del catéter:  Limpie el tapón, las [place], y el eric de entrada de las inyección antes de conectar y después de remover la sonda de paez catéter  Javi el paquete que contiene la gasa con alcohol nueva  Colóquese los guantes de \[facility]\"" nuevos  Use patric nueva toallita de alcohol para limpiar cada parte  Bote todas las gasas con alcohol usadas  · Lavado de paez catéter:  Paez médico podría darle unas jeringas llenas de solución salina (agua salada) o heparina (un anticoagulante) para enjuagar el catéter  ¨ Coloque la jeringa que contiene la solución del enjuague al final de los tubos del catéter  Empuje muy despacio el líquido para irrigar el catéter fuera de la [place]  Bote la [place]  Limpie el extremo del catéter o el tapón con patric nueva toallita de alcohol  ¨ No inserte el líquido a la fuerza  Forzar el líquido podría dañar el catéter, o desprender un coágulo de emmy del final del catéter  Estire el tubo para que no tenga pliegues  Llame a paez médico si le resulta difícil insertar el líquido en el catéter  · Cambiar el tapón y las sondas de medicamento:  Es posible que necesite utilizar sondas adicionales para recibir medicamentos  Pregunte a paez médico la frecuencia con la que debe cambiar las tapas y los tubos para medicamentos  · Sujetar el catéter:  Podría ser necesario cerrar el catéter en algunos momentos, efren cuando se están cambiando los tubos y las tapas  El catéter se debe cerrar para ayudar a prevenir que [employer]  · Enrolle los tubos extras:  Enrolle sin apretar, los tubos sobrantes del catéter  Asegúrela a paez brazo con esparadrapo o cinta médica  [place] ayudará a prevenir que el catéter se jale accidentalmente  ¿Cuáles son los riesgos de tener patric línea central sin túnel? · El catéter podría entrar en el área o vaso vascular equivocado jon el procedimiento   Aire o emmy podría filtrarse en el espacio almarissa [employer] pulmones y provocar problemas cardíacos o pulmonares  Usted podría contraer patric infección en el área donde el catéter entra a paez cuerpo o en paez torrente sanguíneo  El catéter podría romperse, doblarse, o salirse de lugar y no funcionar  Es probable que los médicos tengan que remover el catéter y colocarle yeison nuevo  · El medicamento administrado podría gotearle en la piel y provocarle dolor, inflamación y [place]  Usted podría presentar sangrado, patric alergia a la heparina, o trombocitopenia inducida por heparina  La trombocitopenia inducida por heparina es un recuento bajo de plaquetas en la emmy, que aumenta el riesgo de [place]  Usted podría desarrollar un coágulo en la vena donde le colocaron el catéter  [place] puede causar dolor e inflamación, y puede detener el flujo sanguíneo en paez cuerpo  El coágulo se podría desprender y viajar a es pulmones  Un coágulo sanguíneo en es pulmones puede causar dolor de pecho y dificultad para respirar  Velia problema puede poner en peligro paez alda  · Si no le colocan patric línea central, es probable que no pueda recibir los medicamentos o tratamientos necesarios  Si los medicamentos que dañan las venas pequeñas se administran a través de [place] vía [place] regular, es venas podrían resultar dañadas  ¿Cuándo clay comunicarme con mi médico?  Pregúntele a paez Sveta Fox vitaminas y minerales son adecuados para usted  · Usted tiene fiebre  · El sitio donde está el catéter está whitaker, tibio, adolorido, o le está o saliendo líquido  · Usted nota emmy en paez vendaje y la cantidad [place]  · Las venas de paez ted o pecho se inflaman  · Usted no puede enjuagar la sonda o siente dolor cuando lo hace  · Usted nota que el catéter se está haciendo más corto o se   · Usted nota patric perforación o Yohannes Samara en paez catéter   Cierre el catéter por encima del lugar donde nota el daño antes de comunicarse con paez médico      · Usted tiene preguntas sobre cómo cuidar de bradford catéter  · Usted se queda sin suministros para el cuidado de la piel o el catéter  ¿Cuándo clay buscar atención inmediata? Busque atención médica de inmediato o llame al 911 si:  · Usted tiene [employer] brazo, ted, hombro o pecho  · El sitio de inserción del catéter se torna frío, cambia de color, o no lo puede sentir  · Usted nota ampollas en bradford piel alrededor del lugar donde entra el catéter  · Usted tiene dolor en el pecho o dificultad para respirar que empeora a medida que pasa el [place]  ACUERDOS SOBRE BRADFORD CUIDADO:   Usted tiene el derecho de ayudar a planear bradford cuidado  Aprenda todo lo que pueda sobre bradford condición y efren darle tratamiento  Discuta es opciones de tratamiento con es médicos para decidir el cuidado que usted desea recibir  Usted siempre tiene el derecho de rechazar el tratamiento  Esta información es sólo para uso en educación  [place] intención no es darle un consejo médico sobre enfermedades o tratamientos  Colsulte con bradford [place] farmacéutico antes de seguir cualquier régimen médico para saber si es seguro y efectivo para usted  © 2017 2600 [facility] Information is for End User's use only and may not be sold, redistributed or otherwise used for commercial purposes  All illustrations and images included in CareNotes® are the copyrighted property of A D A M , Inc  or Orion Castillo

## 2020-01-07 NOTE — TELEPHONE ENCOUNTER
New Patient Encounter    New Patient Intake Form   Patient Details:  You Trotter Core  1940  [id number]    Background Information:  [address] starts by opening a telephone encounter and gathering the following information   Who is calling to schedule? If not self, relationship to patient? Leann Doan (Resident [facility])   Referring Provider Leann Doan (Resident)   What is the diagnosis? Renal Faliure   Is this diagnosis confirmed Yes   Is there a confirmed diagnosis from a biopsy/tissue reviewed by pathology? No   Is there any prior history of Cancer? NA   If yes, please explain N/A   When was the diagnosis? December 2019   Is patient aware of diagnosis? Yes   Reason for visit? NP DX and Hospital F/U   Have you had any testing done? If so: when, where? Yes   Are records in [facility]? yes   Was the patient told to bring a disk? no   Scheduling Information:   Preferred [place]:  [place]     Requesting Specific Provider? Any   Are there any dates/time the patient cannot be seen? N/A      Miscellaneous: N/A   After completing the above information, please route to Financial Counselor and the appropriate Nurse Navigator for review

## 2020-01-07 NOTE — PROGRESS NOTES
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 and aortic valve resuspension with a 26 mm Dacron graft; POD # 22      24 Hour Events:  Permacath exchanged  Tolerated 2 hours HD yesterday  Remains in rate controlled atrial fibrillation       Medications:   Scheduled Meds:    Current Facility-Administered Medications:  acetaminophen 650 mg Oral Q4H PRN Osito Edmond PA-C   albuterol 2 5 mg Nebulization Q6H PRN Silvia Christopher DO   aspirin 81 mg Oral Daily Sumaya Perry PA-C   bisacodyl 10 mg Rectal Daily PRN Osito Edmond PA-C   guaiFENesin 600 mg Oral Q12H [address] Sumaya Perry PA-C   heparin (porcine) 2,000 Units Intravenous After Dialysis Sun Campos DO   insulin lispro 1-5 Units Subcutaneous TID AC Alex NEETU Linder PA-C   insulin lispro 1-5 Units Subcutaneous HS Alex Linder PA-C   iohexol 50 mL Oral 90 min pre-procedure Christina Reyes PA-C   menthol-methyl salicylate  Apply externally 4x Daily PRN Ann-Marie Hdez PA-C   metoclopramide 5 mg Intravenous Q8H PRN Tod Labclairor Melendez SaltsTRINY   metoprolol tartrate 12 5 mg Oral Q12H [address] Silvia Do MD   midodrine 5 mg Oral TID AC Dieter Gaviria PA-C   ondansetron 4 mg Intravenous Q6H Jamesyn Labclairor Melendez TRINY [place]   oxyCODONE-acetaminophen 1 tablet Oral Q4H PRN Osito Edmond PA-C   oxyCODONE-acetaminophen 2 tablet Oral Q6H PRN Abeba Linder PA-C   pantoprazole 40 mg Oral Daily Alex NEETU Linder PA-C   polyethylene glycol 17 g Oral Daily Alexmaurice Sullivan PA-C   senna-docusate sodium 1 tablet Oral BID Tod Becerraor Melendez TRINY [place]   temazepam 15 mg Oral HS PRN Osito Edmond PA-C   warfarin 3 mg Oral Once (warfarin) Jase Jasmine PA-C     Continuous Infusions:   PRN Meds:   acetaminophen    albuterol    bisacodyl    heparin (porcine)   menthol-methyl salicylate    metoclopramide    oxyCODONE-acetaminophen    oxyCODONE-acetaminophen    temazepam    Vitals:   Vitals:    01/06/20 2252 01/07/20 0355 01/07/20 0600 01/07/20 0734   BP: 111/58 125/65  136/70   BP Location: Left arm Left arm  Left arm   Pulse: 63 71  72   Resp: 19 19  18   Temp: 98 4 °F (36 9 °C) 98 2 °F (36 8 °C)  97 6 °F (36 4 °C)   TempSrc: Oral Oral  Oral   SpO2: 95% 92%  91%   Weight:   80 2 kg (176 lb 12 9 oz)    Height:           Telemetry: Afib;  Heart Rate: 75    Respiratory:   SpO2: SpO2: 91 %; Room air    Intake/Output: +340 ml / 24 hrs  I/O       12/28 0701 - 12/29 0700 12/29 0701 - 12/30 0700 12/30 0701 - 12/31 0700    P  O  720 1050     I V  (mL/kg) 500 (6 3)      Total Intake(mL/kg) 1220 (15 3) 1050 (13)     Urine (mL/kg/hr) 100 (0 1) 450 (0 2)     Emesis/NG output 0      Other 301      Stool 0      Total Output 401 450     Net +819 +600            Unmeasured Urine Occurrence 2 x      Unmeasured Stool Occurrence 3 x      Unmeasured Emesis Occurrence 1 x                Weights:   Weight (last 2 days)     Date/Time   Weight    01/07/20 0600   80 2 (176 81)    01/06/20 0600   82 (180 78)    01/05/20 0600   81 9 (180 56)            Admit weight: 83 5 kg    Results:   Results from last 7 days   Lab Units 01/06/20  0542 01/05/20  0445 01/04/20  0557   WBC Thousand/uL 11 88* 12 41* 14 06*   HEMOGLOBIN g/dL 8 6* 7 7* 8 4*   HEMATOCRIT % 27 0* 25 1* 26 6*   PLATELETS Thousands/uL 286 229 235     Results from last 7 days   Lab Units 01/07/20  0602 01/06/20  0542 01/05/20  0445   SODIUM mmol/L 140 141 137   POTASSIUM mmol/L 4 5 5 4* 5 2   CHLORIDE mmol/L 106 106 104   CO2 mmol/L 29 29 27   BUN mg/dL 59* 77* 65*   CREATININE mg/dL 4 91* 5 97* 5 30*   CALCIUM mg/dL 8 2* 8 0* 7 9*     Results from last 7 days   Lab Units 01/07/20  0602 01/06/20  0542 01/05/20  0445   INR  1 68* 1 55* 1 54*       Glucose       Studies:  No studies past 24 hrs      Invasive Lines/Tubes:  Invasive Devices     Peripheral Intravenous Line            Peripheral IV 01/05/20 Left Antecubital 2 days          Hemodialysis Catheter            HD Permanent Double Catheter less than 1 day                Physical Exam:    HEENT/NECK:  PERRLA  No jugular venous distention  Cardiac: Irregularly irregular rate and rhythm and No murmurs/rubs/gallops  Pulmonary:  Breath sounds clear bilaterally and No rales/rhonchi/wheezes  Abdomen:  Non-tender, Non-distended and Normal bowel sounds  Incisions: mild sternal instability  Extremities: Extremities warm/dry and Radial pulses 2+ bilaterally  Neuro: Alert and oriented X 3, Sensation is grossly intact and No focal deficits  Skin: Warm/Dry, without rashes or lesions  Assessment:  Principal Problem:    Dissection of thoracic aorta (HCC)  Active Problems:    Benign essential hypertension    Abnormal TSH    Benign hypertension with CKD (chronic kidney disease) stage III (HCC)    Benign prostatic hyperplasia without lower urinary tract symptoms    S/P aorta repair    Postoperative anemia due to acute blood loss    Anticoagulation goal of INR 2 to 3    AFSHIN (acute kidney injury) ([address] )    Hyperphosphatemia    Hypotension    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    Complication of vascular dialysis catheter       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, Ascending aortic aneurysm     S/P Replacement of ascending aorta with aggressive hemiarch reconstruction and aortic valve resuspension with a 26 mm Dacron graft; POD # 22    Plan:    1  Cardiac:   NSR; HR/BP well-controlled  Lopressor, 12 5mg PO BID   Midodrine 10mg TID, BP better, will start to taper  Continue ASA and Statin therapy  Epicardial pacing wires out  Patient no longer has central IV access   Continue DVT prophylaxis  PAF - Coumadin 3mg tonight   Sternal dissociation - pt asymptomatic  Continue to monitor      2  Pulmonary:    Acute post-op pulmonary insufficiency resolved  Good room air oxygen saturation  Chest tubes have been discontinued    3  Renal:   Intake/Output net:-340 mL/24 hours  HD today  Outpatient facility arranged  4  Neuro:  Neurologically intact; No active issues  Incisional pain well-controlled; Continue prn Percocet    5  GI:  Tolerating TLC 2 3 gm sodium diet, renal modification  Maintain 1800 mL daily fluid restriction   Continue stool softeners and prn suppository  Continue GI prophylaxis    6  Endo:   No history of diabetes; Glucose well-controlled with sliding scale coverage    7    Hematology:     Post-operative acute blood loss anemia; Hemoglobin 8 6; trend prn  Leukocytosis -stable, afebrile  Monitor  8    Disposition:      Plan to discharge today after HD        VTE Pharmacologic Prophylaxis: Warfarin (Coumadin)  VTE Mechanical Prophylaxis: sequential compression device    Collaborative rounds completed with Dr Tremayne Rick of care discussed with bedside nurse    SIGNATURE: Cherelle Guerrier PA-C  DATE: January 7, 2020  TIME: 7:51 AM

## 2020-01-07 NOTE — PLAN OF CARE
Problem: PHYSICAL THERAPY ADULT  Goal: Performs mobility at highest level of function for planned discharge setting  See evaluation for individualized goals  Description  Treatment/Interventions: Functional transfer training, LE strengthening/ROM, Therapeutic exercise, Endurance training, Bed mobility, Gait training, Spoke to nursing, OT  Equipment Recommended: Walker(at this time)       See flowsheet documentation for full assessment, interventions and recommendations  Outcome: Progressing  Note:   Prognosis: Good  Problem List: Decreased endurance, Impaired balance, Decreased mobility  Assessment: The pt  was educated in the importance of continued mobility at home as well as his cardiac precautions  Addressed his and his family's questions about returning home  Will continue to follow  Barriers to Discharge: None     Recommendation: Home PT, Home with family support     PT - OK to Discharge: Yes    See flowsheet documentation for full assessment

## 2020-01-07 NOTE — PROGRESS NOTES
[id number] uneventful night  Vital signs WDL, A-Fib on tele (pt has know hx of A-Fib)  No complaints of pain  Will continue to monitor

## 2020-01-07 NOTE — DISCHARGE SUMMARY
Discharge Summary - Cardiothoracic Surgery   [facility] A Core 70 y o  male MRN: [id number]  [facility] Encounter: [id number]    Admission Date: 12/14/2019     Discharge Date: 01/07/20    Admitting Diagnosis: Chest pain [R07 9]    Primary Discharge Diagnosis:   Aortic dissection  S/P aortic valve resuspension and ascending aorta/hemiarch; Secondary Discharge Diagnosis:   Patient Active Problem List   Diagnosis    Rotator cuff tear arthropathy, right    Secondary osteoarthritis of right shoulder    Aftercare following surgery of the musculoskeletal system    Benign essential hypertension    Abnormal TSH    Overweight (BMI 25 0-29  9)    Chronic midline back pain    Benign hypertension with CKD (chronic kidney disease) stage III (HCC)    Benign prostatic hyperplasia without lower urinary tract symptoms    Bradycardia    SOBOE (shortness of breath on exertion)    Dissection of thoracic aorta (HCC)    S/P aorta repair    Postoperative anemia due to acute blood loss    Anticoagulation goal of INR 2 to 3    AFSHIN (acute kidney injury) ([address] )    Hyperphosphatemia    Hypotension    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    Complication of vascular dialysis catheter    Pneumonia     Pneumonia resolved at discharge  Attending: CHIDI Monk  Consulting Physician(s):   Cardiology  Medical/Critical Care  Nephrology  Infectious disease    Procedures Performed:   12/15/19: Replacement of ascending aorta with aggressive hemiarch reconstruction and aortic valve resuspension with a 26 mm Dacron graft  1/2/20: Tunneled HD catheter placement    1/6/20: Replacement of tunneled HD catheter  Hospital Course:   1515: 82-year-old male with a past medical history of hypertension and BPH who presents with acute onset of chest pain  He presented for evaluation and treatment in the emergency department    CTA was performed identified ascending aortic dissection with intramural hematoma  Emergency cardiothoracic surgery consultation was completed  Proper workup was initiated  Went to the operating room for emergent for  admission for Ascending aorta/hemiarch replacement, resuspension of AV  Intraoperative transfusions include 2 units of platelets  Transferred to ICU supported with Primacor  No significant postoperative bleeding  Wean towards extubation  12/16: Extubated last night, currently on 4L NC  Remains on Milrinone 0 13, Vaso 0 04 and Levo 3mcg  NSR  +5 2L/24 hrs  Cr 2 9 (2 1), INR 1 75  Wean Milrinone off, then wean Levo and Vaso off as tolerated  Start Lasix 40mg IV BID, Metoprolol 12 5mg q12  Transition to [facility]  PM: Weaned off all drips, swan and claudine removed  Lasix increased to 40mg/hr with zaroxlyn due to decreased urine output, q8h BMP pending  12/17: Weaned off all drips except Lasix at 30mg/hr with  800ml/24, Cr 3 9  On 2LNC  MAP 93, NSR 75  D/C EPW, D/C CT  Maintain Lasix drip for a negative balance today of 1  2 L  Transfer to stepdown  12/18: UOP 3 2L/24hrs  Decrease Lasix drip to 10mg/hr  Cr increased to 4 5 from 4  Shine remains  Increase Lopressor to 25 BID  WBC 17 (19)  Remains on 2L NC  Irregular rhythm on telemetry  EKG ordered, showing afib  Amio bolus and drip started  HR 70s  PM: Remains in a fib (70s), continue amio gtt, check INR & dose Coumadin 5mg tonight  UOP >1L from [id number], remains on Lasix 10   12/19: NSR this morning, continue amio drip and PO amio  INR 1 4  Dose Coumadin 5 mg tonight  UOP 2 6L/24hrs  Cr 5 1 (4 55), Renal consulted  Hypokalemia, K 3 2, replete and add KCL 40 mEq BID  PM: amio drip discontinued  Remains on 2L NC  CXR shows left pleural effusion  12/20: Seen by nephrology, hold diuretics today  UOP 3 2L/24hrs, Cr 5 14  Continue shine  K 3 0  replete today, repeat potassium at 2pm  WBC 12 (13 9), Hg 9 8 (10 4), plts 103 (111)  EKG shows prolonged QTc, hold AM dose of amiodarone   Unable to tolerate beta blocker due to SBP 80s-90s, decrease dose to 12 5 mg BID with hold parameters  Given 250cc albumin overnight for SBP 80s, give another 250cc  INR 2 72, dose Coumadin 2 mg tonight  PM: resumed po amiodarone per cardiology,  repeat K 4 1  12/21: Hypotension improved with 250cc albumin  Per cardiology, restart PO amiodarone for improving QTc on EKG  Hold beta blocker for hypotension in setting of AFSHIN on CKD  Add midodrine 2 5 mg PO TID  Cr 4 38  UOP 2 4L/24hrs  off diuretics  INR 3 54, hold Coumadin tonight  Discontinue subq heparin  12/22: Increase midodrine to 5 mg TID for hypotension  In afib, HR 100s  UOP1 5L/24hrs  Cr 4 33  Persistent nausea, possible dialysis tomorrow, per nephrology  INR 2 75, dose Coumadin 2 5 mg tonight  12/23: Hypotension improved with midodrine  Tolerating beta blocker  NSR  INR 2 78, dose Coumadin 2 mg tonight  Cr increased to 5  15  For Permacath placement in IR today, then HD following  NPO until catheter placement  12/24: BP stable  HD today  WBC 23 8, decreased L base, check CXR  INR 2 92  1 mg tonight  PM: CXR w/ suspected left pleural effusion, consult IR for thoracentesis w/ 1 5L sanguineous drainage removed  12/25: WBC 29K, afebrile, IR pleural fluid no bacteria, productive yellow/tan sputum culture, repeat Pa/lateral today, cdiff sent  Start Cefepime and Flagyl  If WBC elevated tomorrow ID consult  Add ATC nebs with guafenesin  Nausea, d/c statin for now  INR 4 9  10 mg po vitamin K  LFTs sent, slightly above normal    12/26: HD today  Continues in PAF, INR 3 03, hold Coumadin today  Cefepime/Flagyl day 2, wbc 26k, sputum culture gram stain GNR  Pa/lateral today  Pleural fluid from thoracentesis no growth to date  CXR shows small residual left effusion/no obvious pulmonary consolidation  12/27: WBC 30, sputum with Serratia, afebrile, ID consult placed  On day 3 of Cefepime and Flagyl    Recommend CT chest/abd/pelvis with PO contrast  IR consult for residual left pleural effusion  Pleural fluid from prior thoracentesis no growth to date  Remains in PAF, INF 1 86, hold Coumadin tonight for thoracentesis  CXR post thoracentesis  Midodrine increased to 10 mg tid  PM: Thoracentesis with 500cc serosanguinous  Abx continued per ID  CT scan done, results pending, 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  Holding Amio for GI upset  12/28: WBC 26, Continue cefepime per ID remains afebrile  Reglan prn for nausea and Zofran ATC check CMP in the morning  CT scan with sternal dissociation minimal sternal instability on exam and atelectasis vs aspiration  Continues in PAF, INR 1 83  1 mg Coumadin tonight  Overall better appearing today  12/29: WBC 24k, remains afebrile, BC negative growth  ID recommendations to continue cefepime  Nausea improved  INR 1 87  1 mg Coumadin tonight, remains in rate controlled atrial fibrillation  12/30: Cr 6 46, HD today  NSR, on 4L NC  INR 1 82  Coumadin 1mg tonight  WBC 21 6, Procalcitonin downtrending 4 82 (10 98)  Continue Cefepime x 2 more days per ID    12/31: Cr 5 06  WBC stable 21 02  INR 1 90  No Coumadin tonight for anticipation of Permacath placement  IR for planned insertion Thursday; NPO order placed for Wednesday at [facility]    1/1: WBC 19 09, completed Cefepime yesterday  OK per ID and Renal for Permacath placement tomorrow  Scheduled for HD tomorrow  Cr 5 91  INR 19 0  No Coumadin tonight in anticipation for Permacath placement  NPO after [facility]  Remains on 2L O2  wean off  NSR   1/2: For HD and permacath placement today  INR 1 70  Coumadin 2mg tonight  NSR, 2L NC  wean off  Cr 5 9, Hg 8 4  Pt on T,Th,Sat HD schedule  1/3: In rate controlled Afib  Cr 4 40, Hg 9 3  INR 1 48  Coumadin 2mg tonight  For HD tomorrow, then plan to discharge home in afternoon  1/4: Remains NSR, on 2L NC this morning  Only able to complete 1 5hrs of HD today due to permacath clotting/malfunction  Discharge home cancelled   IR consulted for catheterogram/possible replacement on Monday 1/5: Cr 5 3, INR 1 54  1mg Coumadin tonight (lower dosing in anticipation for IR procedure tomorrow  NPO after MN tonight  1/6: SCr 5 9  For IR permacath eval today  Currently NPO  Dose Coumadin 3mg tonight  PM: Successful exchange for longer permacath in IR  HD x 2 hours today, tolerated well  1/7: no acute issues  HD today in-house, will discharge after  Arrangements made for outpatient HD on T/TH/S  Condition at Discharge:   good     Discharge Physical Exam:    HEENT/NECK:  PERRLA  No jugular venous distention  Cardiac: Irregularly irregular rate and rhythm and No murmurs/rubs/gallops  Pulmonary:  Breath sounds clear bilaterally and No rales/rhonchi/wheezes  Abdomen:  Non-tender, Non-distended and Normal bowel sounds  Incisions: mild sternal instability  Extremities: Extremities warm/dry and Radial pulses 2+ bilaterally  Neuro: Alert and oriented X 3, Sensation is grossly intact and No focal deficits  Skin: Warm/Dry, without rashes or lesions    Discharge Data:  Results from last 7 days   Lab Units 01/06/20  0542 01/05/20 0445 01/04/20  0557   WBC Thousand/uL 11 88* 12 41* 14 06*   HEMOGLOBIN g/dL 8 6* 7 7* 8 4*   HEMATOCRIT % 27 0* 25 1* 26 6*   PLATELETS Thousands/uL 286 229 235     Results from last 7 days   Lab Units 01/07/20  0602 01/06/20  0542 01/05/20  0445   POTASSIUM mmol/L 4 5 5 4* 5 2   CHLORIDE mmol/L 106 106 104   CO2 mmol/L 29 29 27   BUN mg/dL 59* 77* 65*   CREATININE mg/dL 4 91* 5 97* 5 30*   CALCIUM mg/dL 8 2* 8 0* 7 9*     Results from last 7 days   Lab Units 01/07/20  0602 01/06/20  0542 01/05/20  0445   INR  1 68* 1 55* 1 54*       Discharge instructions/Information to patient and family:   See after visit summary for information provided to patient and family  Heavenly Gaines was educated on restrictions regarding driving and lifting, and techniques of proper incisional care    They were specifically counselled on signs and symptoms of a sternal wound infection, and advised to contact our service immediately should they develop fevers, sweats, chill, redness or drainage at the site of any incisions  Provisions for Follow-Up Care:  See after visit summary for information related to follow-up care and any pertinent home health orders  Disposition:  Home    Planned Readmission:   No    Discharge Medications:  See after visit summary for reconciled discharge medications provided to patient and family  Leni Fernández was provided contact information and scheduled a follow up appointment with CHIDI Becerril  Additionally, they were advised to schedule follow up appointments to be seen by their primary care physician within 7-10 days, and their cardiologist within 2-3 weeks  Contact information was provided  Oliver Sharpe was counseled on the importance of avoiding tobacco products  As with all patients whom have undergone open heart surgery, tobacco cessation medication was contraindicated at the time of discharge  ACE/ARB was Contraindicated secondary to renal dysfunction    Diuresis was not prescribed as patient has new dx  Of AFSHIN requiring HD  HD arrangements made for T/Th/S as outpatient  Leni Fernández is being discharged on anticoagulation therapy for treatment of atrial fibrillation  As they are new to Coumadin, arrangements have been made for [address] [place] [address] office to monitor INR levels and provide dosing instructions  Their office was notified by phone call and facsimile report which itemized the patients daily INRs and correlating Coumadin doses during their hospitalization  The patient has been provided a prescription for PT/INR to be drawn every Monday and Thursday, with results to Dr Christoph Chan office  They have been prescribed Coumadin, 2 5 mg tabs, with 90 tablets being dispensed  Finally, they have been advised to take 2 5 mg daily, unless otherwise directed        Narcotic pain medication was prescribed in the form of oxycodone  Prior to prescribing, their prescription profile was reviewed on the [facility] of health prescription drug monitoring program     The patient was informed that following their postoperative surgical evaluation, they will be referred to outpatient cardiac rehabilitation  They were counseled that this program is run by specialists who will help them safely strengthen their heart and prevent more heart disease  Cardiac rehabilitation will include exercise, relaxation, stress management, and heart-healthy nutrition  Caregivers will also check to make sure their medication regimen is working  I spent 30 minutes discharging the patient  This time was spent on the day of discharge  I had direct contact with the patient on the day of discharge  Additional documentation is required if more than 30 minutes were spent on discharge       SIGNATURE: Bro Doe PA-C  DATE: January 7, 2020  TIME: 8:28 AM

## 2020-01-07 NOTE — PLAN OF CARE
Problem: Potential for Falls  Goal: Patient will remain free of falls  Description  INTERVENTIONS:  - Assess patient frequently for physical needs  -  Identify cognitive and physical deficits and behaviors that affect risk of falls    -  [place] fall precautions as indicated by assessment   - Educate patient/family on patient safety including physical limitations  - Instruct patient to call for assistance with activity based on assessment  - Modify environment to reduce risk of injury  - Consider OT/PT consult to assist with strengthening/mobility  Outcome: Progressing     Problem: PAIN - ADULT  Goal: Verbalizes/displays adequate comfort level or baseline comfort level  Description  Interventions:  - Encourage patient to monitor pain and request assistance  - Assess pain using appropriate pain scale  - Administer analgesics based on type and severity of pain and evaluate response  - Implement non-pharmacological measures as appropriate and evaluate response  - Consider cultural and social influences on pain and pain management  - Notify physician/advanced practitioner if interventions unsuccessful or patient reports new pain  Outcome: Progressing     Problem: INFECTION - ADULT  Goal: Absence or prevention of progression during hospitalization  Description  INTERVENTIONS:  - Assess and monitor for signs and symptoms of infection  - Monitor lab/diagnostic results  - Monitor all insertion sites, i e  indwelling lines, tubes, and drains  - Monitor endotracheal if appropriate and nasal secretions for changes in amount and color  - [place] appropriate cooling/warming therapies per order  - Administer medications as ordered  - Instruct and encourage patient and family to use good hand hygiene technique  - Identify and instruct in appropriate isolation precautions for identified infection/condition  Outcome: Progressing     Problem: SAFETY ADULT  Goal: Maintain or return to baseline ADL function  Description  INTERVENTIONS:  -  Assess patient's ability to carry out ADLs; assess patient's baseline for ADL function and identify physical deficits which impact ability to perform ADLs (bathing, care of mouth/teeth, toileting, grooming, dressing, etc )  - Assess/evaluate cause of self-care deficits   - Assess range of motion  - Assess patient's mobility; develop plan if impaired  - Assess patient's need for assistive devices and provide as appropriate  - Encourage maximum independence but intervene and supervise when necessary  - Involve family in performance of ADLs  - Assess for home care needs following discharge   - Consider OT consult to assist with ADL evaluation and planning for discharge  - Provide patient education as appropriate  Outcome: Progressing  Goal: Maintain or return mobility status to optimal level  Description  INTERVENTIONS:  - Assess patient's baseline mobility status (ambulation, transfers, stairs, etc )    - Identify cognitive and physical deficits and behaviors that affect mobility  - Identify mobility aids required to assist with transfers and/or ambulation (gait belt, sit-to-stand, lift, walker, cane, etc )  - [place] fall precautions as indicated by assessment  - Record patient progress and toleration of activity level on Mobility SBAR; progress patient to next Phase/Stage  - Instruct patient to call for assistance with activity based on assessment  - Consider rehabilitation consult to assist with strengthening/weightbearing, etc   Outcome: Progressing     Problem: DISCHARGE PLANNING  Goal: Discharge to home or other facility with appropriate resources  Description  INTERVENTIONS:  - Identify barriers to discharge w/patient and caregiver  - Arrange for needed discharge resources and transportation as appropriate  - Identify discharge learning needs (meds, wound care, etc )  - Arrange for interpretive services to assist at discharge as needed  - Refer to Case Management Department for coordinating discharge planning if the patient needs post-hospital services based on physician/advanced practitioner order or complex needs related to functional status, cognitive ability, or social support system  Outcome: Progressing     Problem: Knowledge Deficit  Goal: Patient/family/caregiver demonstrates understanding of disease process, treatment plan, medications, and discharge instructions  Description  Complete learning assessment and assess knowledge base    Interventions:  - Provide teaching at level of understanding  - Provide teaching via preferred learning methods  Outcome: Progressing     Problem: NEUROSENSORY - ADULT  Goal: Achieves stable or improved neurological status  Description  INTERVENTIONS  - Monitor and report changes in neurological status  - Monitor vital signs such as temperature, blood pressure, glucose, and any other labs ordered   - Initiate measures to prevent increased intracranial pressure  - Monitor for seizure activity and implement precautions if appropriate      Outcome: Progressing  Goal: Remains free of injury related to seizures activity  Description  INTERVENTIONS  - Maintain airway, patient safety  and administer oxygen as ordered  - Monitor patient for seizure activity, document and report duration and description of seizure to physician/advanced practitioner  - If seizure occurs,  ensure patient safety during seizure  - Reorient patient post seizure  - Seizure pads on all 4 side rails  - Instruct patient/family to notify RN of any seizure activity including if an aura is experienced  - Instruct patient/family to call for assistance with activity based on nursing assessment  - Administer anti-seizure medications if ordered    Outcome: Progressing  Goal: Achieves maximal functionality and self care  Description  INTERVENTIONS  - Monitor swallowing and airway patency with patient fatigue and changes in neurological status  - Encourage and assist patient to increase activity and self care     - Encourage visually impaired, hearing impaired and aphasic patients to use assistive/communication devices  Outcome: Progressing     Problem: CARDIOVASCULAR - ADULT  Goal: Maintains optimal cardiac output and hemodynamic stability  Description  INTERVENTIONS:  - Monitor I/O, vital signs and rhythm  - Monitor for S/S and trends of decreased cardiac output  - Administer and titrate ordered vasoactive medications to optimize hemodynamic stability  - Assess quality of pulses, skin color and temperature  - Assess for signs of decreased coronary artery perfusion  - Instruct patient to report change in severity of symptoms  Outcome: Progressing  Goal: Absence of cardiac dysrhythmias or at baseline rhythm  Description  INTERVENTIONS:  - Continuous cardiac monitoring, vital signs, obtain 12 lead EKG if ordered  - Administer antiarrhythmic and heart rate control medications as ordered  - Monitor electrolytes and administer replacement therapy as ordered  Outcome: Progressing     Problem: RESPIRATORY - ADULT  Goal: Achieves optimal ventilation and oxygenation  Description  INTERVENTIONS:  - Assess for changes in respiratory status  - Assess for changes in mentation and behavior  - Position to facilitate oxygenation and minimize respiratory effort  - Oxygen administered by appropriate delivery if ordered  - Initiate smoking cessation education as indicated  - Encourage broncho-pulmonary hygiene including cough, deep breathe, Incentive Spirometry  - Assess the need for suctioning and aspirate as needed  - Assess and instruct to report SOB or any respiratory difficulty  - Respiratory Therapy support as indicated  Outcome: Progressing     Problem: METABOLIC, FLUID AND ELECTROLYTES - ADULT  Goal: Electrolytes maintained within normal limits  Description  INTERVENTIONS:  - Monitor labs and assess patient for signs and symptoms of electrolyte imbalances  - Administer electrolyte replacement as ordered  - Monitor response to electrolyte replacements, including repeat lab results as appropriate  - Instruct patient on fluid and nutrition as appropriate  Outcome: Progressing  Goal: Fluid balance maintained  Description  INTERVENTIONS:  - Monitor labs   - Monitor I/O and WT  - Instruct patient on fluid and nutrition as appropriate  - Assess for signs & symptoms of volume excess or deficit  Outcome: Progressing     Problem: Prexisting or High Potential for Compromised Skin Integrity  Goal: Skin integrity is maintained or improved  Description  INTERVENTIONS:  - Identify patients at risk for skin breakdown  - Assess and monitor skin integrity  - Assess and monitor nutrition and hydration status  - Monitor labs   - Assess for incontinence   - Turn and reposition patient  - Assist with mobility/ambulation  - Relieve pressure over bony prominences  - Avoid friction and shearing  - Provide appropriate hygiene as needed including keeping skin clean and dry  - Evaluate need for skin moisturizer/barrier cream  - Collaborate with interdisciplinary team   - Patient/family teaching  - Consider wound care consult   Outcome: Progressing     Problem: Nutrition/Hydration-ADULT  Goal: Nutrient/Hydration intake appropriate for improving, restoring or maintaining nutritional needs  Description  Monitor and assess patient's nutrition/hydration status for malnutrition  Collaborate with interdisciplinary team and initiate plan and interventions as ordered  Monitor patient's weight and dietary intake as ordered or per policy  Utilize nutrition screening tool and intervene as necessary  Determine patient's food preferences and provide high-protein, high-caloric foods as appropriate       INTERVENTIONS:  - Monitor oral intake, urinary output, labs, and treatment plans  - Assess nutrition and hydration status and recommend course of action  - Evaluate amount of meals eaten  - Assist patient with eating if necessary   - Allow adequate time for meals  - Recommend/ encourage appropriate diets, oral nutritional supplements, and vitamin/mineral supplements  - Order, calculate, and assess calorie counts as needed  - Recommend, monitor, and adjust tube feedings and TPN/PPN based on assessed needs  - Assess need for intravenous fluids  - Provide specific nutrition/hydration education as appropriate  - Include patient/family/caregiver in decisions related to nutrition  Outcome: Progressing

## 2020-01-07 NOTE — PLAN OF CARE
Goal- remove 1-1 5 L as tolerated, MAP> 65      Problem: METABOLIC, FLUID AND ELECTROLYTES - ADULT  Goal: Electrolytes maintained within normal limits  Description  INTERVENTIONS:  - Monitor labs and assess patient for signs and symptoms of electrolyte imbalances  - Administer electrolyte replacement as ordered  - Monitor response to electrolyte replacements, including repeat lab results as appropriate  - Instruct patient on fluid and nutrition as appropriate  Outcome: Progressing

## 2020-01-08 ENCOUNTER — TRANSITIONAL CARE MANAGEMENT (OUTPATIENT)
Dept: FAMILY MEDICINE CLINIC | Facility: CLINIC | Age: 80
End: 2020-01-08

## 2020-01-09 ENCOUNTER — ANTICOAG VISIT (OUTPATIENT)
Dept: CARDIOLOGY CLINIC | Facility: CLINIC | Age: 80
End: 2020-01-09

## 2020-01-09 DIAGNOSIS — Z51.81 ANTICOAGULATION GOAL OF INR 2 TO 3: ICD-10-CM

## 2020-01-09 DIAGNOSIS — Z79.01 ANTICOAGULATION GOAL OF INR 2 TO 3: ICD-10-CM

## 2020-01-09 LAB — INR PPP: 1.4 (ref 0.84–1.19)

## 2020-01-13 ENCOUNTER — ANTICOAG VISIT (OUTPATIENT)
Dept: CARDIOLOGY CLINIC | Facility: CLINIC | Age: 80
End: 2020-01-13

## 2020-01-13 DIAGNOSIS — Z51.81 ANTICOAGULATION GOAL OF INR 2 TO 3: ICD-10-CM

## 2020-01-13 DIAGNOSIS — Z79.01 ANTICOAGULATION GOAL OF INR 2 TO 3: ICD-10-CM

## 2020-01-13 LAB — INR PPP: 1.7 (ref 0.84–1.19)

## 2020-01-15 ENCOUNTER — OFFICE VISIT (OUTPATIENT)
Dept: NEPHROLOGY | Facility: CLINIC | Age: 80
End: 2020-01-15

## 2020-01-15 ENCOUNTER — OFFICE VISIT (OUTPATIENT)
Dept: CARDIOLOGY CLINIC | Facility: CLINIC | Age: 80
End: 2020-01-15
Payer: COMMERCIAL

## 2020-01-15 VITALS
WEIGHT: 177.2 LBS | DIASTOLIC BLOOD PRESSURE: 79 MMHG | HEIGHT: 69 IN | BODY MASS INDEX: 26.25 KG/M2 | HEART RATE: 74 BPM | SYSTOLIC BLOOD PRESSURE: 126 MMHG

## 2020-01-15 VITALS
HEART RATE: 70 BPM | SYSTOLIC BLOOD PRESSURE: 146 MMHG | DIASTOLIC BLOOD PRESSURE: 76 MMHG | BODY MASS INDEX: 26.2 KG/M2 | OXYGEN SATURATION: 95 % | WEIGHT: 176.9 LBS | HEIGHT: 69 IN

## 2020-01-15 DIAGNOSIS — I71.01 DISSECTION OF THORACIC AORTA (HCC): Primary | ICD-10-CM

## 2020-01-15 DIAGNOSIS — D62 POSTOPERATIVE ANEMIA DUE TO ACUTE BLOOD LOSS: ICD-10-CM

## 2020-01-15 DIAGNOSIS — N17.9 AKI (ACUTE KIDNEY INJURY) (HCC): Primary | ICD-10-CM

## 2020-01-15 DIAGNOSIS — I10 HYPERTENSION, UNSPECIFIED TYPE: ICD-10-CM

## 2020-01-15 DIAGNOSIS — I48.0 PAROXYSMAL ATRIAL FIBRILLATION (HCC): ICD-10-CM

## 2020-01-15 DIAGNOSIS — Z98.890 S/P AORTA REPAIR: ICD-10-CM

## 2020-01-15 DIAGNOSIS — E78.5 HYPERLIPIDEMIA, UNSPECIFIED HYPERLIPIDEMIA TYPE: ICD-10-CM

## 2020-01-15 DIAGNOSIS — I12.9 BENIGN HYPERTENSION WITH CKD (CHRONIC KIDNEY DISEASE) STAGE III (HCC): Chronic | ICD-10-CM

## 2020-01-15 DIAGNOSIS — N18.30 BENIGN HYPERTENSION WITH CKD (CHRONIC KIDNEY DISEASE) STAGE III (HCC): Chronic | ICD-10-CM

## 2020-01-15 DIAGNOSIS — Z95.828 HX OF ASCENDING AORTA REPLACEMENT: ICD-10-CM

## 2020-01-15 DIAGNOSIS — I71.01 DISSECTION OF THORACIC AORTA (HCC): ICD-10-CM

## 2020-01-15 DIAGNOSIS — N18.6 ESRD (END STAGE RENAL DISEASE) (HCC): ICD-10-CM

## 2020-01-15 PROCEDURE — PBNCHG PB NO CHARGE PLACEHOLDER: Performed by: INTERNAL MEDICINE

## 2020-01-15 PROCEDURE — 99214 OFFICE O/P EST MOD 30 MIN: CPT | Performed by: NURSE PRACTITIONER

## 2020-01-15 NOTE — PROGRESS NOTES
OFFICE FOLLOW UP - Nephrology   [facility] A Core 70 y o  male MRN: [id number]       ASSESSMENT and PLAN:  [facility] was seen today for follow-up and chronic kidney disease  Diagnoses and all orders for this visit:    AFSHIN (acute kidney injury) ([facility] )    Benign hypertension with CKD (chronic kidney disease) stage III ([facility] )    Dissection of thoracic aorta (HCC)    S/P aorta repair    Postoperative anemia due to acute blood loss        This is a 22-year-old gentleman with known history of chronic kidney disease stage III with previous creatinine around 1 4-1 8, recently hospitalized in December 2019 with 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 on 12/15, postoperative course complicated with acute kidney injury suspected secondary to contrast induced nephropathy/postoperative ATN  Patient was started on dialysis on 12/23/2019, today presents to the office for follow-up  1  Acute kidney injury, suspected secondary to contrast induced nephropathy/postoperative ATN  Started on hemodialysis since 12/23/2019  Currently on dialysis on a TTS schedule at [facility] [place]  Discussed with patient about will continue with dialysis and following his labs as an outpatient for renal recovery  I discussed with patient that some of my colleagues as well as advanced practitioner will continue seen him in the outpatient dialysis unit  Advised to save nondominant arm in case of further permanent dialysis access is needed in the future  Discussed about importance of PermCath care to prevent infections  Avoid NSAIDs  Follow-up as needed    2  Chronic kidney disease stage 3 previous creatinine around 1 4-1 8  3  Hypertension, blood pressure acceptable on metoprolol  4  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 status post replacement of ascending aortic with jordy arch reconstruction and aortic valve resuspension with at 26 mm Dacron graft on 12/15/2019    Continue follow-up with cardiac surgery  Patient Instructions   Continue with dialysis at [address] [place] [address] Dialysis Unit  We will continue following regular labs awaiting for renal recovery  Continue with low-salt and renal diet  Do not take NSAIDs (no ibuprofen, Motrin, Advil, Aleve, naproxen)  Okay to take Tylenol or paracetamol or acetaminophen as needed for pain  Save your nondominant arm (left arm) for dialysis access in the future if needed, no intravenously lines or blood draws over the left arm  HPI: Viral Aldridge is a 70 y o  male who is here for Follow-up and Chronic Kidney Disease    This is a patient with known history of chronic kidney disease previous creatinine 1 4-1 8, hypertension, who was hospitalized on December 2018 at [facility] after presenting with acute onset chest pain, CTA showed an ascending aortic dissection with intramural hematoma, patient underwent emergent ascending aortic/jordy arch replacement on 12/15  Postoperative course complicated with acute kidney injury suspected secondary to contrast nephropathy/of postoperative ATN, patient was started on dialysis on 12/23/2018  He was discharged from the hospital on 01/07/2020, his currently on dialysis at [facility] [address] [place] [address] on a TTS schedule  Today presents to the office for evaluation with his daughter  In general feeling better, continue with some on and off chest pain when coughing  Denies significant chest pain or shortness of breath, continue with lower extremity edema but is improving  Denies any abdominal pain, no nausea, vomiting, no diarrhea or constipation  Denies any urinary problems, no burning sensation or gross hematuria  The last blood work was done on 01/07/2020, which we have reviewed together  ROS:   All the systems were reviewed and were negative except as documented on the HPI  Allergies: Patient has no known allergies      Medications:   Current Outpatient Medications:     acetaminophen (TYLENOL) 325 mg tablet, Take 1-2 tabs q6h PRN mild fever/pain, Disp: 90 tablet, Rfl: 0    aspirin 81 mg chewable tablet, Chew 1 tablet (81 mg total) daily, Disp: 90 tablet, Rfl: 0    atorvastatin (LIPITOR) 40 mg tablet, Take 1 tablet (40 mg total) by mouth daily, Disp: 90 tablet, Rfl: 0    bisacodyl (DULCOLAX) 5 mg EC tablet, Take 2 tablets (10 mg total) by mouth daily as needed for constipation, Disp: 60 tablet, Rfl: 0    metoprolol tartrate (LOPRESSOR) 25 mg tablet, Take 0 5 tablets (12 5 mg total) by mouth every 12 (twelve) hours, Disp: 180 tablet, Rfl: 0    omeprazole (PriLOSEC) 20 mg delayed release capsule, take 1 capsule by mouth daily at bedtime, Disp: 90 capsule, Rfl: 2    oxyCODONE (ROXICODONE) 5 mg immediate release tablet, Take 1-2 pills PO q6h as needed for mild to moderate pain  Ongoing therapy for postoperative pain , Disp: 30 tablet, Rfl: 0    tamsulosin (FLOMAX) 0 4 mg, 0 4 mg daily with dinner , Disp: , Rfl:     warfarin (COUMADIN) 2 5 mg tablet, Take 2 5mg daily unless otherwise directed based on INR , Disp: 90 tablet, Rfl: 0    polyethylene glycol (MIRALAX) 17 g packet, Take 17 g by mouth daily IF PACKETS UNAVAILABLE, MAY TAKE OTC EQUIVALENT  USED AS DIRECTED  (Patient not taking: Reported on 1/15/2020), Disp: 510 g, Rfl: 0    Past Medical History:   Diagnosis Date    Arthritis     BPH without urinary obstruction     CKD (chronic kidney disease), stage III (HCC)     baseline 1 6-1 7    GERD (gastroesophageal reflux disease)     Hyperlipidemia     Hypertension      Past Surgical History:   Procedure Laterality Date    APPENDECTOMY      COLONOSCOPY  2017    FRACTURE SURGERY      [facility] EXCHANGE  1/6/2020    [facility] PLACEMENT  1/2/2020    IR TEMP HD CATH  12/23/2019    IR THORACENTESIS  12/24/2019    IR THORACENTESIS  12/27/2019    TX ASCEND AORTA GRAFT W ROOT REPLACMENT  VALVE CONDUIT/CORON RECONSTRUCT N/A 12/15/2019    Procedure: BENTALL PROCEDURE (ASCENDING AORTIC REPAIR) with 26mm Gelweave Graft;  Surgeon: Silvia Christopher DO;  Location: [facility];  Service: Cardiac Surgery    [place] RECONSTR TOTAL SHOULDER IMPLANT Right 12/5/2017    Procedure: ARTHROPLASTY SHOULDER REVERSE with OPEN CYST EXCISION;  Surgeon: Delta Villegas MD;  Location: [facility];  Service: Orthopedics    SHOULDER SURGERY      WRIST SURGERY       Family History   Problem Relation Age of Onset    No Known Problems Mother     Hypertension Father     Arthritis Family       reports that he quit smoking about 40 years ago  He smoked 1 00 pack per day  He has never used smokeless tobacco  He reports that he drank alcohol  He reports that he has current or past drug history  Physical Exam:   Vitals:    01/15/20 1500 01/15/20 1524   BP:  126/79   BP Location:  Left arm   Patient Position:  Sitting   Pulse:  74   Weight: 80 4 kg (177 lb 3 2 oz)    Height: 5' 9" (1 753 m)      Body mass index is 26 17 kg/m²  General: cooperative, in not acute distress  Eyes: conjunctivae pale, anicteric sclerae  ENT: lips and mucous membranes moist  Neck: supple, no JVD  Chest: clear breath sounds bilateral, no crackles, ronchus or wheezings  CVS: distinct S1 & S2, normal rate, regular rhythm  Abdomen: soft, non-tender, non-distended, normoactive bowel sounds  Back: no CVA tenderness  Extremities: 2+ edema of both legs  Skin: no rash  Neuro: awake, alert, oriented  Right IJ PermCath in place          Portions of the record may have been created with voice recognition software  Occasional wrong word or "sound a like" substitutions may have occurred due to the inherent limitations of voice recognition software  Read the chart carefully and recognize, using context, where substitutions have occurred  If you have any questions, please contact the dictating provider

## 2020-01-15 NOTE — PATIENT INSTRUCTIONS
Continue with dialysis at [address] [place] [address] Dialysis Unit  We will continue following regular labs awaiting for renal recovery  Continue with low-salt and renal diet  Do not take NSAIDs (no ibuprofen, Motrin, Advil, Aleve, naproxen)  Okay to take Tylenol or paracetamol or acetaminophen as needed for pain  Save your nondominant arm (left arm) for dialysis access in the future if needed, no intravenously lines or blood draws over the left arm

## 2020-01-15 NOTE — PROGRESS NOTES
Cardiology Follow Up    [facility]  1940  [id number]  [address] [facility] Ellyn Kocher  [place] [facility] [address]  [phone]  [phone]    Hospital follow up visit     Interval History:   Mr Magaly Kahn was admitted to [facility] on 12/14 - 1/07/19 with dissection of thoracic aorta  Mr Sharpe presented to the emergency room with an acute onset of chest pain  His pain came on suddenly in the middle of his chest and radiated to the center of his back associated with diaphoresis and episode of vomiting  CTA showed 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  He was started on Esmolol and Cardene  He was seen emergently by Cardiothoracic surgery  [facility] underwent emergent   Replacement of ascending aorta with aggressive jordy arch reconstruction and aortic valve resuspension with a 26 mm Dacron graft by Dr Shane Byers  Postoperatively he did not experience significant bleeding  Mr Sharpe experienced a complicated postoperative course including worsening creatinine, paroxysmal atrial fibrillation started on IV amiodarone  Prolonged QTC and Amiodarone held  Hypotension started on midodrine  Coumadin started for stroke prevention  WBC 30 and ID was consulted  Sputum positive for Serratia  He was treated with cefepime  PermCath placed for hemodialysis  Hemodialysis started  1/06/2020 PermCath changed  He was discharged home on 1/07/2020  HD to be done on Tuesday/Thursday and Saturday  Discharge lab sodium 140 potassium 4 5 BUN 59 creatinine 14 91 GFR 11 phosphorus 4 8 INR 1 68  Mr Minor Rosy our office for recent hospitalization follow-up visit  He is accompanied by his daughter and granddaughter  Mr Sharpe denies chest pain palpitations lightheadedness or dizziness  He admits to mid sternal muscle and bone pain  He continues to urinate    Mr Sharpe admits to a good appetite  No recent lab studies completed  He is followed by VNA and PT         HPI:  HTN  CKD III  GERD  HLD  BPH  Patient Active Problem List   Diagnosis    Rotator cuff tear arthropathy, right    Secondary osteoarthritis of right shoulder    Aftercare following surgery of the musculoskeletal system    Benign essential hypertension    Abnormal TSH    Overweight (BMI 25 0-29  9)    Chronic midline back pain    Benign hypertension with CKD (chronic kidney disease) stage III ([facility])    Benign prostatic hyperplasia without lower urinary tract symptoms    Bradycardia    SOBOE (shortness of breath on exertion)    Dissection of thoracic aorta ([facility])    S/P aorta repair    Postoperative anemia due to acute blood loss    Anticoagulation goal of INR 2 to 3    AFSHIN (acute kidney injury) ([facility] [address] )    Hyperphosphatemia    Hypotension    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    Complication of vascular dialysis catheter    Pneumonia     Past Medical History:   Diagnosis Date    Arthritis     BPH without urinary obstruction     CKD (chronic kidney disease), stage III ([facility])     baseline 1 6-1 7    GERD (gastroesophageal reflux disease)     Hyperlipidemia     Hypertension      Social History     Socioeconomic History    Marital status: /Civil Union     Spouse name: Not on file    Number of children: Not on file    Years of education: Not on file    Highest education level: Not on file   Occupational History    Not on file   Social Needs    Financial resource strain: Not on file    Food insecurity:     Worry: Not on file     Inability: Not on file    Transportation needs:     Medical: Not on file     Non-medical: Not on file   Tobacco Use    Smoking status: Former Smoker     Packs/day: 1 00     Last attempt to quit: 1980     Years since quittin 0    Smokeless tobacco: Never Used   Substance and Sexual Activity    Alcohol use: Not Currently    Drug use: Not Currently    Sexual activity: Not on file   Lifestyle    Physical activity:     Days per week: Not on file     Minutes per session: Not on file    Stress: Not on file   Relationships    Social connections:     Talks on phone: Not on file     Gets together: Not on file     Attends [employer] service: Not on file     Active member of club or organization: Not on file     Attends meetings of clubs or organizations: Not on file     Relationship status: Not on file    Intimate partner violence:     Fear of current or ex partner: Not on file     Emotionally abused: Not on file     Physically abused: Not on file     Forced sexual activity: Not on file   Other Topics Concern    Not on file   Social History Narrative    Daily caffeine consumption, 1 serving a day    Drinks coffee       Family History   Problem Relation Age of Onset    No Known Problems Mother     Hypertension Father     Arthritis Family      Past Surgical History:   Procedure Laterality Date    APPENDECTOMY      COLONOSCOPY  2017    FRACTURE SURGERY      IR [address] EXCHANGE  1/6/2020    IR [address] PLACEMENT  1/2/2020    IR TEMP HD CATH  12/23/2019    IR THORACENTESIS  12/24/2019    IR THORACENTESIS  12/27/2019    OH ASCEND AORTA GRAFT W ROOT REPLACMENT  VALVE CONDUIT/CORON RECONSTRUCT N/A 12/15/2019    Procedure: BENTALL PROCEDURE (ASCENDING AORTIC REPAIR) with 26mm Gelweave Graft;  Surgeon: Becky Melgoza DO;  [facility]: [facility];  Service: Cardiac Surgery    OH RECONSTR TOTAL SHOULDER IMPLANT Right 12/5/2017    Procedure: ARTHROPLASTY SHOULDER REVERSE with OPEN CYST EXCISION;  Surgeon: Oneal Ozuna MD;  Location[facility];  Service: Orthopedics    SHOULDER SURGERY      WRIST SURGERY         Current Outpatient Medications:     acetaminophen (TYLENOL) 325 mg tablet, Take 1-2 tabs q6h PRN mild fever/pain, Disp: 90 tablet, Rfl: 0    aspirin 81 mg chewable tablet, Chew 1 tablet (81 mg total) daily, Disp: 90 tablet, Rfl: 0   atorvastatin (LIPITOR) 40 mg tablet, Take 1 tablet (40 mg total) by mouth daily, Disp: 90 tablet, Rfl: 0    bisacodyl (DULCOLAX) 5 mg EC tablet, Take 2 tablets (10 mg total) by mouth daily as needed for constipation, Disp: 60 tablet, Rfl: 0    metoprolol tartrate (LOPRESSOR) 25 mg tablet, Take 0 5 tablets (12 5 mg total) by mouth every 12 (twelve) hours, Disp: 180 tablet, Rfl: 0    omeprazole (PriLOSEC) 20 mg delayed release capsule, take 1 capsule by mouth daily at bedtime, Disp: 90 capsule, Rfl: 2    oxyCODONE (ROXICODONE) 5 mg immediate release tablet, Take 1-2 pills PO q6h as needed for mild to moderate pain  Ongoing therapy for postoperative pain , Disp: 30 tablet, Rfl: 0    tamsulosin (FLOMAX) 0 4 mg, 0 4 mg daily with dinner , Disp: , Rfl:     warfarin (COUMADIN) 2 5 mg tablet, Take 2 5mg daily unless otherwise directed based on INR , Disp: 90 tablet, Rfl: 0    polyethylene glycol (MIRALAX) 17 g packet, Take 17 g by mouth daily IF PACKETS UNAVAILABLE, MAY TAKE OTC EQUIVALENT  USED AS DIRECTED  (Patient not taking: Reported on 1/15/2020), Disp: 510 g, Rfl: 0  No Known Allergies    Labs: Anticoag visit on 01/13/2020   Component Date Value    INR 01/13/2020 1 70*   Anticoag visit on 01/09/2020   Component Date Value    INR 01/09/2020 1 40*   No results displayed because visit has over 200 results  Imaging: Xr Chest Pa & Lateral    Result Date: 12/27/2019  Narrative: CHEST INDICATION:   s/p thoracentesis  COMPARISON:  Chest x-ray from 12/26/2019  EXAM PERFORMED/VIEWS:  XR CHEST PA & LATERAL FINDINGS:  Right IJ central line is unchanged  Again seen are median sternotomy wires  Cardiomediastinal silhouette appears unremarkable  The lungs are clear  Small left pleural effusion, decreased since thoracentesis, without pneumothorax  Again seen is right shoulder arthroplasty  Impression: Small left pleural effusion, decreased since thoracentesis, without pneumothorax   Workstation performed: [id number]     Xr Chest Pa & Lateral    Result Date: 12/26/2019  Narrative: CHEST INDICATION:   hypoxia, s/p thoracentesis, possible pneumonia  COMPARISON:  12/24/2019 EXAM PERFORMED/VIEWS:  XR CHEST PA & LATERAL FINDINGS:  There are median sternotomy wires indicating prior cardiac surgery  Heart shadow is enlarged but unchanged from prior exam  Left-sided pleural effusion has diminished in size  No evidence of pneumothorax  Underlying left basilar airspace disease noted  Central line positioning is unchanged  Impression: Status post thoracentesis, with diminished size of left effusion and no evidence of pneumothorax Workstation performed: [id number]     Xr Chest Pa & Lateral    Result Date: 12/24/2019  Narrative: CHEST INDICATION:   leukocytosis, decreased breath sounds left base, hypoxia  COMPARISON:  12/19/2019 EXAM PERFORMED/VIEWS:  XR CHEST PA & LATERAL FINDINGS:  There are median sternotomy wires indicating prior cardiac surgery  There is mild enlargement of the cardiac silhouette similar to the previous study  Vascular access catheter tip is seen in the right atrium  No pneumothorax  There is increased size of a left pleural effusion, and likely with underlying left lower lobe atelectasis or pneumonia  There is a trace right effusion  There is a right shoulder reverse arthroplasty     Impression: Increased size of left pleural effusion/underlying left basilar atelectasis or infiltrate  Workstation performed: [id number]     Xr Chest Pa & Lateral    Result Date: 12/20/2019  Narrative: CHEST INDICATION:   supplemental oxygen use, hypoxia  COMPARISON:  12/16/2019 EXAM PERFORMED/VIEWS:  XR CHEST PA & LATERAL FINDINGS:  Right central line tip projects over the cavoatrial junction  Cardiomediastinal silhouette appears unremarkable  Small left basilar pleural effusion  Osseous structures appear within normal limits for patient age  Right shoulder reverse arthroplasty noted       Impression: Persistent small left basilar pleural effusion with lungs otherwise clear  Workstation performed: [id number]     Us Retroperitoneal Complete    Result Date: 12/27/2019  Narrative: RENAL ULTRASOUND INDICATION:   AFSHIN  COMPARISON: Same day CT  Retroperitoneal ultrasound 2/13/2017 TECHNIQUE:   Ultrasound of the retroperitoneum was performed with a curvilinear transducer utilizing volumetric sweeps and still imaging techniques  FINDINGS: KIDNEYS: Symmetric and normal size  Right kidney:  6 7 x 4 0 cm  Left kidney:  10 3 x 5 8 cm  Right kidney Atrophic  No suspicious masses detected  No hydronephrosis  No shadowing calculi  No perinephric fluid collections  Left kidney Normal echogenicity and contour  No suspicious masses detected  Small renal cysts measuring up to 1 5 cm  No hydronephrosis  No shadowing calculi  No perinephric fluid collections  URETERS: Nonvisualized  BLADDER: Decompressed and poorly evaluated  Impression: No hydronephrosis  Asymmetric atrophy of the right kidney  Small left renal cysts  The bladder is decompressed and poorly evaluated  Workstation performed: [id number]     Ir Thoracentesis    Result Date: 12/27/2019  Narrative: ULTRASOUND-GUIDED THORACENTESIS PROCEDURE SUMMARY: Ultrasound of the left  chest Thoracentesis (therapeutic) : Attending(s): Hannah MURILLO  Assistant(s): None CLINICAL HISTORY: Left symptomatic pleural effusion COMPLICATIONS: No immediate complications TECHNIQUE: The risks, benefits and alternatives of the procedure were fully discussed  All questions were answered  Informed consent for the procedure was obtained and time-out was performed prior to the procedure  The patient was brought to the interventional radiology area and placed in the sitting position on the side of a stretcher  The left chest was then examined with ultrasound and the skin was marked  The skin was then prepped, and draped in usual sterile fashion    All elements of maximal sterile barrier technique were followed (cap, mask, sterile gown, sterile gloves, large sterile sheet, hand hygiene, and 2% chlorhexidine for cutaneous antisepsis)  Lidocaine was administered to the skin  Under direct ultrasound guidance, a 5-Sami Yueh catheter was advanced into the pleural fluid  An ultrasound image was saved  The catheter was placed to suction drainage  At the end of the procedure the catheter was removed and sterile dressing was applied to the puncture site  Patient tolerated the procedure well  Specimens removed: 500 mL of serosanguineous pleural fluid  Impression: Technically successful ultrasound-guided thoracentesis  Workstation performed: [id number]     Ir Thoracentesis    Result Date: 12/24/2019  Narrative: EXAMINATION: Left thoracentesis INDICATION: 82-year-old male status post ascending aorta repair for type A aortic dissection was found to have left pleural effusion and is referred for thoracentesis  CONTRAST: N/A FLUOROSCOPY TIME:   N/A IMAGES:  2 ANESTHESIA: Local lidocaine PROCEDURE:  The patient was identified verbally and by wristband  Timeout was performed  Informed consent was obtained  Following obtaining informed consent, the patient was prepped and draped in the usual sterile fashion  All elements of maximal sterile barrier technique, cap and mask and sterile gloves and sterile drape and hand hygiene and 2% chlorhexidine for cutaneous antisepsis  Sterile ultrasound technique with sterile gel and sterile probe covers was also utilized  Under ultrasound guidance, a 5-Sami Yueh centesis needle catheter was advanced into the left pleural effusion  1 5 L of bloody pleural effusion was removed  Catheter was removed and bandage applied  The patient tolerated the procedure well without complication  The patient left the IR department in stable condition  Findings: Successful left thoracentesis with 1 5 L of bloody pleural effusion removed       Impression: Impression: Successful ultrasound-guided left thoracentesis  Workstation performed: [id number]     Ct Chest Abdomen Pelvis W Contrast    Result Date: 12/27/2019  Narrative: CT CHEST, ABDOMEN AND PELVIS WITH IV CONTRAST INDICATION:   serratia sputum, leukocytosis  COMPARISON:  CT 12/14/2019 TECHNIQUE: CT examination of the chest, abdomen and pelvis was performed  Axial, sagittal, and coronal 2D reformatted images were created from the source data and submitted for interpretation  Radiation dose length product (DLP) for this visit:  870 44 mGy-cm   This examination, like all CT scans performed in the [facility], was performed utilizing techniques to minimize radiation dose exposure, including the use of iterative  reconstruction and automated exposure control  IV Contrast:  100 mL of iodixanol (VISIPAQUE) <See Chart>  of iohexol (OMNIPAQUE) Enteric Contrast: Enteric contrast was not administered  FINDINGS: CHEST LUNGS: Dense bibasilar consolidation representing atelectasis or aspiration  Mild upper lobe predominant centrilobular emphysema  No pneumothorax  Central airways are patent  PLEURA: Trace right and small left pleural effusions  HEART/GREAT VESSELS: 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  Persistent intramural hematoma extending from the aortic root to the level of the distal descending aorta, measuring up to 8mm in thickness  MEDIASTINUM AND CHON:  Fluid collection in the retrosternal space measuring 5 2 x 2 3 x 10 5 cm (series 2, image 28 and series 602, image 119) could be postsurgical but superimposed infection cannot be excluded  Right IJ central venous catheter terminating at the cavoatrial junction  Calcified right hilar and subcarinal lymph nodes  CHEST WALL AND LOWER NECK: Recent median sternotomy with 1 0 cm bony diastasis  ABDOMEN LIVER/BILIARY TREE: Nodular liver contour suggestive of cirrhosis  Patent hepatic vasculature  No biliary ductal dilation   GALLBLADDER:  No calcified gallstones  No pericholecystic inflammatory change  SPLEEN:  Unremarkable  PANCREAS:  Unremarkable  ADRENAL GLANDS:  Unremarkable  KIDNEYS/URETERS:  Asymmetric atrophy of the right kidney  Left renal cysts are  No hydronephrosis  STOMACH AND BOWEL:  Colonic diverticulosis without findings of diverticulitis  No disproportionate dilation of small or large bowel loops  APPENDIX:  No findings to suggest appendicitis  ABDOMINOPELVIC CAVITY:  No ascites or free intraperitoneal air  No lymphadenopathy  VESSELS:  Unremarkable for patient's age  PELVIS REPRODUCTIVE ORGANS:  Unremarkable for patient's age  URINARY BLADDER:  Unremarkable  ABDOMINAL WALL/INGUINAL REGIONS:  Unremarkable  OSSEOUS STRUCTURES:  No acute fracture or destructive osseous lesion  Impression: 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  Persistent intramural hematoma extending from the aortic root to the level of the distal descending aorta, measuring up to 8mm in thickness  Recent median sternotomy with 1 0 cm bony diastasis  Fluid collection in the retrosternal space measuring 5 2 x 2 3 x 10 5 cm could be postsurgical but superimposed infection cannot be excluded  Trace right and small left pleural effusions  Dense bibasilar consolidation representing atelectasis or aspiration  Nodular liver contour suggesting cirrhosis  Workstation performed: [id number]      Permacath Placement    Result Date: 1/2/2020  Narrative: Tunneled pheresis catheter placement  Clinical History: Renal insufficiency requiring dialysis Fluoroscopy time:  0 5 minutes Moderate sedation: 30 minutes Technique: The patient was brought to the interventional radiology suite and identified verbally and by wristband  The patient was placed supine on the table  The right internal jugular vein was evaluated as a potential access site with ultrasound  The right internal jugular vein was found to be patent and compressible   The indwelling right neck nontunneled dialysis catheter was removed  Hemostasis was achieved by manual compression and a sterile dressing was applied  The right neck and upper chest were prepped and draped in the usual sterile fashion  All elements of maximal sterile barrier technique were followed (cap, mask, sterile gown, sterile gloves, large sterile sheet, hand hygiene, and 2% chlorhexidine for cutaneous antisepsis)  Lidocaine was administered to the skin of the right neck and a small skin incision was made  Under direct ultrasound guidance, a 21 gauge needle was advanced into the right internal jugular vein  A representative image was saved for permanent recording and reporting  An 0 018 wire was then advanced through the needle into the central venous system  The needle was removed, and a 5 Gambian coaxial dilator was inserted  The tip of the 018 wire was positioned within the midright atrium and used to estimate  needed length of catheter  Based on this estimate a 19 cm cuff to tip length catheter was chosen  One percent lidocaine was infiltrated in the right chest wall along the subcutaneous tunnel  Stab incision was made  The catheter was tunneled under the skin and brought out through the incision at the neck entry site  A heavy duty wire was inserted through the outer dilator and a peel-away sheath was inserted over the wire  The catheter was advanced through the peel-away and the peel-away was removed  The catheter tip was then positioned in the right atrium under fluoroscopic control  The external portion of the catheter was sutured to the skin with 2-0 prolene  The neck incision was then closed with Histoacryl  A sterile dressing was applied and 100 unit heparin/cc solution was administered into each of the lumens  Impression: Impression: Successful removal of an indwelling right neck nontunneled dialysis catheter   Successful ultrasound and fluoroscopically guided placement of a 14 Gambian tunneled dialysis via the right internal jugular vein  The tip of the catheter is in the right atrium and may be used immediately  Workstation performed: [id number]     Ir Temp Hd Cath    Result Date: 12/23/2019  Narrative: Temporary dialysis catheter placement  Clinical History: Recent aortic surgery  Renal failure  Plans for dialysis  Fluoro time: 0 4 minutes Number of Images: 7 Technique: The patient was brought to the interventional radiology suite and identified verbally and by wrist band  The patient was placed supine on the table  The right internal jugular vein was evaluated as a potential access site with ultrasound  The vessel was found to be patent and compressible  The right neck and upper chest were prepped and draped in the usual sterile fashion  All elements of maximal sterile barrier technique were followed (cap, mask, sterile gown, sterile gloves, large sterile sheet, hand hygiene, and 2% chlorhexidine for cutaneous antisepsis)  Lidocaine was administered to the skin and a small skin incision was made  Under ultrasound guidance, utilizing sterile ultrasound technique with sterile gel and a sterile probe cover, the right internal jugular vein was accessed using single wall Seldinger technique  Static images of real time needle entry into the vessel were obtained  A 0 018 wire was then advanced through the needle into the central venous system  The needle was removed, and a 5 Polish coaxial dilator was inserted  An Amplatz wire was inserted through the outer dilator , and after tract dilatation a 14 Polish dialysis catheter was placed over the wire  The catheter tip was then positioned in the right atrium under fluoroscopic control  The external portion of the catheter was sutured to the skin with 2-0 Prolene and silk  The catheter aspirated and flushed well  A sterile dressing was applied and 1,000 unit heparin/cc solution was administered into each of the lumens  Findings: Cardiomegaly   Patent and compressible right internal jugular vein Catheter tip in the right atrium  Impression: Impression: Ultrasound and fluoroscopically guided placement of a 20 cm 14-Indonesian nontunneled dialysis catheter via the right internal jugular vein  Catheter is ready for immediate use  Workstation performed: [id number]     Ir Permacath Exchange    Result Date: 1/6/2020  Narrative: PROCEDURE: Tunneled dialysis catheter exchange FLUOROSCOPY TIME: 0 5mft RADIATION DOSE: 14 mGy NUMBER OF IMAGES: 2 COMPLICATIONS: None ANESTHESIA/ANALGESIA: Local anesthesia INDICATION: Malfunctioning indwelling dialysis catheter  PROCEDURE: The patient was brought to the interventional radiology suite and identified verbally and by wristband  The patient was placed supine on the table and the existing  right IJ tunneled dialysis catheter was prepped and draped in usual sterile fashion  The site was prepped and draped in the usual sterile fashion  All elements of maximal sterile barrier technique were followed (cap, mask, sterile gown, sterile gloves, large sterile sheet, hand hygiene, and 2% chlorhexidine for cutaneous antisepsis)   radiograph of the chest demonstrated indwelling dialysis catheter with catheter tip at the level of the curvature junction  Local anesthesia was applied  The cuff of the indwelling dialysis catheter was  from the tunnel by blunt dissection  A stiff wire was placed through each lumen and the indwelling catheter was then removed  A new 14-Indonesian, longer 28 cm dialysis catheter was placed over the wires  The catheter tip was then positioned in the right atrium under fluoroscopic guidance  The catheter was tested and both flushed and aspirated easily  The external portion of the catheter was sutured to the skin with 2-0 Prolene  A sterile dressing was applied and 1,000 unit heparin/cc solution was administered into each of the lumens  Patient tolerated the procedure well with no immediate postprocedural complication  The catheter may be used immediately  FINDINGS: 1  Indwelling catheter was retracted with tip at the superior cavoatrial junction  Decision was made to exchange for a longer catheter  2  Post exchange radiograph demonstrated tip of the catheter in the right atrium, appropriate in position  There was excellent flow through each lumen of the newly placed catheter  Impression: Technically successful exchange and repositioning of a tunneled dialysis catheter  Workstation performed: [id number]       Review of Systems:  Review of Systems   Constitutional: Positive for fatigue  Musculoskeletal: Positive for arthralgias  Physical Exam:  Physical Exam   Constitutional: He is oriented to person, place, and time  He appears well-developed  HENT:   Head: Normocephalic  Eyes: Pupils are equal, round, and reactive to light  Neck: Normal range of motion  Cardiovascular: Normal rate and regular rhythm  Ectopic beats   Pulmonary/Chest: Effort normal and breath sounds normal    Abdominal: Soft  Bowel sounds are normal    Musculoskeletal: Normal range of motion  He exhibits edema  1+- 2+ blanca tibial and ankle edema    Neurological: He is alert and oriented to person, place, and time  Skin: Skin is warm and dry  Capillary refill takes less than 2 seconds  Sternal incision appears clean dry intact healing without erythema or ecchymosis    Right SC Perma cath appears intact    Psychiatric: He has a normal mood and affect  Vitals reviewed  Discussion/Summary:  1  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  2   12/15/19 sp   Replacement of ascending aorta with aggressive jordy arch reconstruction and aortic valve re suspension with a 26 mm Dacron graft by Dr Giovanni Nicole-  Continue on aspirin 81 mg daily, Lipitor 40 mg daily, metoprolol tartrate 12 5 mg q 12 hours, Prilosec 20 mg daily  CBC, BMP and mag to be done prior to follow up visit with Dr Caroline Carr    3  Paroxysmal Atrial Fibrillation [id number]= 4 ( age, HTN, Vasc disease),  On PE RRR, ectopic beats,  Continue metoprolol tartrate 12 5 mg q 12 hours, Coumadin goal INR 2 0-3 0 followed by [facility], 1/13/19 INR 1 73  Hand out pamphlet provided in regards to coumadin education and diet restrictions   4  Hypertension- LUE sitting 106/67  Continue metoprolol tartrate 12 5 mg q 12 hours  5  ESRD on HD Tuesday/Thursday and Saturday, + blanca LE edema, HD controlling volume status,  Mr Sharpe has an appointment with Dr Pam Quiros today  Mr Sharpe continues to urinate  I will defer oral diuretics vs volume removal with HD to Dr Pam Quiros    6  Hyperlipidemia 3/08/19  Cholesterol 174, triglycerides 82, HDL 43,  continue on  Lipitor 40 mg daily

## 2020-01-16 ENCOUNTER — ANTICOAG VISIT (OUTPATIENT)
Dept: CARDIOLOGY CLINIC | Facility: CLINIC | Age: 80
End: 2020-01-16

## 2020-01-16 DIAGNOSIS — Z79.01 ANTICOAGULATION GOAL OF INR 2 TO 3: ICD-10-CM

## 2020-01-16 DIAGNOSIS — Z51.81 ANTICOAGULATION GOAL OF INR 2 TO 3: ICD-10-CM

## 2020-01-16 LAB — INR PPP: 2.2 (ref 0.84–1.19)

## 2020-01-20 ENCOUNTER — ANTICOAG VISIT (OUTPATIENT)
Dept: CARDIOLOGY CLINIC | Facility: CLINIC | Age: 80
End: 2020-01-20

## 2020-01-20 ENCOUNTER — HOSPITAL ENCOUNTER (OUTPATIENT)
Dept: RADIOLOGY | Facility: HOSPITAL | Age: 80
Discharge: HOME | End: 2020-01-20
Payer: COMMERCIAL

## 2020-01-20 ENCOUNTER — OFFICE VISIT (OUTPATIENT)
Dept: CARDIAC SURGERY | Facility: CLINIC | Age: 80
End: 2020-01-20

## 2020-01-20 ENCOUNTER — TRANSCRIBE ORDERS (OUTPATIENT)
Dept: RADIOLOGY | Facility: HOSPITAL | Age: 80
End: 2020-01-20

## 2020-01-20 VITALS
HEIGHT: 69 IN | TEMPERATURE: 97.3 F | BODY MASS INDEX: 26.51 KG/M2 | RESPIRATION RATE: 14 BRPM | DIASTOLIC BLOOD PRESSURE: 62 MMHG | SYSTOLIC BLOOD PRESSURE: 102 MMHG | OXYGEN SATURATION: 100 % | WEIGHT: 179 LBS | HEART RATE: 80 BPM

## 2020-01-20 DIAGNOSIS — Z98.890 S/P AORTA REPAIR: ICD-10-CM

## 2020-01-20 DIAGNOSIS — R06.02 SOBOE (SHORTNESS OF BREATH ON EXERTION): Primary | ICD-10-CM

## 2020-01-20 DIAGNOSIS — T81.32XD DISRUPTION OR DEHISCENCE OF CLOSURE OF STERNUM OR STERNOTOMY, SUBSEQUENT ENCOUNTER: ICD-10-CM

## 2020-01-20 DIAGNOSIS — Z51.81 ANTICOAGULATION GOAL OF INR 2 TO 3: ICD-10-CM

## 2020-01-20 DIAGNOSIS — I71.01 DISSECTION OF THORACIC AORTA (HCC): ICD-10-CM

## 2020-01-20 DIAGNOSIS — Z79.01 ANTICOAGULATION GOAL OF INR 2 TO 3: ICD-10-CM

## 2020-01-20 DIAGNOSIS — R06.02 SOBOE (SHORTNESS OF BREATH ON EXERTION): ICD-10-CM

## 2020-01-20 LAB — INR PPP: 2.3 (ref 0.84–1.19)

## 2020-01-20 PROCEDURE — 71046 X-RAY EXAM CHEST 2 VIEWS: CPT

## 2020-01-20 PROCEDURE — 99024 POSTOP FOLLOW-UP VISIT: CPT | Performed by: NURSE PRACTITIONER

## 2020-01-20 NOTE — PROGRESS NOTES
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 and was subsequently taken to the OR emergently for surgical intervention  His post op course was prolonged due to ARF in the setting of prior CKDIII  He was evaluated and managed by Nephrology  He underwent permacath placement and started on HD on POD # 8  He underwent left thoracentesis on POD # 9 for 1 5 L bloody fluid and repeat left thoracentesis on POD #12 for 500 cc serosanguineous fluid  He also had post op PAF, treated with Amiodarone and coumadin  Sputum culture grew out Serratia with concomittant leukocytosis therefore he was treated with antibiotics  He was noted to have sternal instability on exam on POD # 13; chest CT scan demonstrated 1 0cm  bony diastisis of sternum  Her remained on HD and was stable for discharge home on 1/7/20  Patient has had cardiology and Nephrology follow up  He continues on HD Q T - Th- Sat  Today patient is accompanies by his wife, daughter and granddaughter  He reports midline chest pain, sternal movement, fatigue, LE edema and SOB  He sleeps on the sofa propped up, mostly due to sternal pain  He has a non-productive cough  He denies fever or chills  He reports his surgical sites have healed  He reports a good appetite  He walks around the house        Vital Signs:   Vitals:    01/20/20 1400 01/20/20 1457   BP: 110/64 102/62   BP Location: Left arm Right arm   Cuff Size: Adult Adult   Pulse: 80    Resp: 14    Temp: (!) 97 3 °F (36 3 °C)    TempSrc: Tympanic    SpO2: 100%    Weight: 81 2 kg (179 lb) Height: 5' 9" (1 753 m)        Home Medications:   Prior to Admission medications    Medication Sig Start Date End Date Taking? Authorizing Provider   acetaminophen (TYLENOL) 325 mg tablet Take 1-2 tabs q6h PRN mild fever/pain 1/7/20  Yes Elana Espinoza PA-C   aspirin 81 mg chewable tablet Chew 1 tablet (81 mg total) daily 1/7/20  Yes Elana Espinoza PA-C   atorvastatin (LIPITOR) 40 mg tablet Take 1 tablet (40 mg total) by mouth daily 1/7/20  Yes Elana Espinoza PA-C   bisacodyl (DULCOLAX) 5 mg EC tablet Take 2 tablets (10 mg total) by mouth daily as needed for constipation 1/7/20 2/6/20 Yes Elana Espinoza PA-C   metoprolol tartrate (LOPRESSOR) 25 mg tablet Take 0 5 tablets (12 5 mg total) by mouth every 12 (twelve) hours 1/7/20  Yes Elana Espinoza PA-C   omeprazole (PriLOSEC) 20 mg delayed release capsule take 1 capsule by mouth daily at bedtime 4/13/18  Yes Erick Quiroga MD   oxyCODONE (ROXICODONE) 5 mg immediate release tablet Take 1-2 pills PO q6h as needed for mild to moderate pain  Ongoing therapy for postoperative pain  1/7/20  Yes Elana Espinoza PA-C   polyethylene glycol (MIRALAX) 17 g packet Take 17 g by mouth daily IF PACKETS UNAVAILABLE, MAY TAKE OTC EQUIVALENT  USED AS DIRECTED  1/7/20  Yes Elana Espinoza PA-C   tamsulosin [facility]) 0 4 mg 0 4 mg daily with dinner  3/2/17  Yes Historical Provider, MD   warfarin (COUMADIN) 2 5 mg tablet Take 2 5mg daily unless otherwise directed based on INR  1/7/20  Yes Elana Espinoza PA-C       Physical Exam:  General: Alert, oriented, well developed, no acute idstress  HEENT/NECK:  PERRLA  No jugular venous distention  Cardiac:Regular rate and rhythm, NO murmurs rubs or gallops  Pulmonary:breath sounds decreased bilateral bases  Abdomen:  Non-tender, Non-distended  Positive bowel sounds  Upper extremities:  2+ radial pulses; brisk capillary refill  Lower extremities: Extremities warm/dry  PT/DP pules 2+ bilaterally    2+ edema B/L  Incisions: Midline skin incision well healed, sternal instability palpated distal sternum  Musculoskeletal: LINO, arrived via wheelchair  Neuro: Alert and oriented X 3  Sensation is grossly intact  No focal deficits  Skin: Warm/Dry, without rashes or lesions  Lab Results:     Results from last 7 days   Lab Units 01/20/20 01/16/20   INR  2 30* 2 20*     Lab Results   Component Value Date    HGBA1C 5 3 11/17/2017     Lab Results   Component Value Date    TROPONINI <0 02 12/14/2019       Assessment: Type A aortic dissection s/p emergent repair   Plan:     Ivon Hatch continues to make good progress in his recovery following emergent Type A aortic dissection repair  He is 5 weeks post op  He remains on HD 3 days a week  Chest skin incision is well-healed and but he still has some sternal instability  We will send him for a CXR today to assess for recurrent pleural effusion given his c/o SOB and cough  We plan to see him back in 1 month with a non-contrast CT scan of the chest to assess sternal healing  If he has improvement of his renal function, we will consider a contrast CT scan at some point to assess his aortic repair  We discussed the benefits of cardiac rehab and provided a script for when patient feels well enough to begin the program  He should continue with a lifting restriction of no more than 10 lb until we assess his sternal healing in another month  Oliver Sharpe and his family members were comfortable with our recommendations and their questions were answered to their satisfaction      Routine referral to gastroenterology for colonoscopy screening was not indicated, as the patient is over 78years old    [place], [place]  1/20/20

## 2020-01-21 ENCOUNTER — TELEPHONE (OUTPATIENT)
Dept: CARDIAC SURGERY | Facility: CLINIC | Age: 80
End: 2020-01-21

## 2020-01-21 NOTE — TELEPHONE ENCOUNTER
Reviewed CXR with Dr Ignacia Keen  No significant effusions    Called patient and provided reports to family member (daughter)

## 2020-01-23 ENCOUNTER — ANTICOAG VISIT (OUTPATIENT)
Dept: CARDIOLOGY CLINIC | Facility: CLINIC | Age: 80
End: 2020-01-23

## 2020-01-23 DIAGNOSIS — Z51.81 ANTICOAGULATION GOAL OF INR 2 TO 3: ICD-10-CM

## 2020-01-23 DIAGNOSIS — Z79.01 ANTICOAGULATION GOAL OF INR 2 TO 3: ICD-10-CM

## 2020-01-23 LAB — INR PPP: 2.3 (ref 0.84–1.19)

## 2020-01-24 ENCOUNTER — OFFICE VISIT (OUTPATIENT)
Dept: FAMILY MEDICINE CLINIC | Facility: CLINIC | Age: 80
End: 2020-01-24

## 2020-01-24 VITALS
DIASTOLIC BLOOD PRESSURE: 80 MMHG | SYSTOLIC BLOOD PRESSURE: 130 MMHG | HEART RATE: 74 BPM | HEIGHT: 69 IN | TEMPERATURE: 97.5 F | BODY MASS INDEX: 25.95 KG/M2 | WEIGHT: 175.2 LBS

## 2020-01-24 DIAGNOSIS — M62.838 MUSCLE SPASM: ICD-10-CM

## 2020-01-24 DIAGNOSIS — Z98.890 S/P AORTA REPAIR: ICD-10-CM

## 2020-01-24 DIAGNOSIS — G47.9 SLEEP DISORDER: Primary | ICD-10-CM

## 2020-01-24 PROCEDURE — 1111F DSCHRG MED/CURRENT MED MERGE: CPT | Performed by: FAMILY MEDICINE

## 2020-01-24 PROCEDURE — 3288F FALL RISK ASSESSMENT DOCD: CPT | Performed by: FAMILY MEDICINE

## 2020-01-24 PROCEDURE — 99213 OFFICE O/P EST LOW 20 MIN: CPT | Performed by: FAMILY MEDICINE

## 2020-01-24 PROCEDURE — 1101F PT FALLS ASSESS-DOCD LE1/YR: CPT | Performed by: FAMILY MEDICINE

## 2020-01-24 PROCEDURE — 3725F SCREEN DEPRESSION PERFORMED: CPT | Performed by: FAMILY MEDICINE

## 2020-01-24 NOTE — ASSESSMENT & PLAN NOTE
Unable to sleep 2/2 pain and positioning s/p surgery  Given Melatonin 500 mcg and instructed patient to take it a few hours prior to sleep

## 2020-01-24 NOTE — PROGRESS NOTES
BMI Counseling: Body mass index is 25 87 kg/m²  The BMI {VB BMI Counselin}Assessment/Plan:    No problem-specific Assessment & Plan notes found for this encounter  {Assess/PlanSmartLinks:98805}      Subjective:      Patient ID: Megan Klein is a 70 y o  male      HPI    {Common ambulatory SmartLinks:86217}    Review of Systems      Objective:      /80 (BP Location: Left arm, Patient Position: Sitting, Cuff Size: Large)   Pulse 74   Temp 97 5 °F (36 4 °C) (Tympanic)   Ht 5' 9" (1 753 m)   Wt 79 5 kg (175 lb 3 2 oz)   BMI 25 87 kg/m²          Physical Exam

## 2020-01-24 NOTE — ASSESSMENT & PLAN NOTE
Acute, ongoing for 3 days in L lumbar area  -Patient sleeps in seated position to avoid shortness of breath s/p surgery    -Denies dysuria, hematuria  Negative CVS tendernes  -Muscle relaxants contraindicated given side effect of arrhythmia in the setting of recent cardiac event    -Encouraged continue tylenol and current pain regimen to control pain

## 2020-01-24 NOTE — ASSESSMENT & PLAN NOTE
S/p Type A aortic dissection repair  -Replacement of ascending aortic with jordy arch reconstruction and aortic valve resuspension with a 26 mm Dacron graft on 12/15/2019    -Followed by cardiac surgery, last seen 1/20/2020  Has some shortness of breath on exertion, chest pain on site of sternal wound  Is expected to follow up with cardiac surgery again in 1 month after chest CT to assess sternal healing   -Currently on Metoprolol 25 mg, Atorvastatin 40 mg, ans ASA 81 mg   -Also taking Roxicodone 5 mg as needed for post-op pain  Continue to follow up with cardiac surgery as scheduled      Will follow up for his medicare wellness exam

## 2020-01-24 NOTE — PROGRESS NOTES
Assessment/Plan:    S/P aorta repair  S/p Type A aortic dissection repair  -Replacement of ascending aortic with jordy arch reconstruction and aortic valve resuspension with a 26 mm Dacron graft on 12/15/2019    -Followed by cardiac surgery, last seen 1/20/2020  Has some shortness of breath on exertion, chest pain on site of sternal wound  Is expected to follow up with cardiac surgery again in 1 month after chest CT to assess sternal healing   -Currently on Metoprolol 25 mg, Atorvastatin 40 mg, ans ASA 81 mg   -Also taking Roxicodone 5 mg as needed for post-op pain  Continue to follow up with cardiac surgery as scheduled  Will follow up for his medicare wellness exam     Muscle spasm  Acute, ongoing for 3 days in L lumbar area  -Patient sleeps in seated position to avoid shortness of breath s/p surgery    -Denies dysuria, hematuria  Negative CVS tendernes  -Muscle relaxants contraindicated given side effect of arrhythmia in the setting of recent cardiac event    -Encouraged continue tylenol and current pain regimen to control pain  Sleep disorder  Unable to sleep 2/2 pain and positioning s/p surgery  Given Melatonin 500 mcg and instructed patient to take it a few hours prior to sleep  Problem List Items Addressed This Visit        Other    S/P aorta repair     S/p Type A aortic dissection repair  -Replacement of ascending aortic with jordy arch reconstruction and aortic valve resuspension with a 26 mm Dacron graft on 12/15/2019    -Followed by cardiac surgery, last seen 1/20/2020  Has some shortness of breath on exertion, chest pain on site of sternal wound  Is expected to follow up with cardiac surgery again in 1 month after chest CT to assess sternal healing   -Currently on Metoprolol 25 mg, Atorvastatin 40 mg, ans ASA 81 mg   -Also taking Roxicodone 5 mg as needed for post-op pain  Continue to follow up with cardiac surgery as scheduled      Will follow up for his medicare wellness exam  Muscle spasm     Acute, ongoing for 3 days in L lumbar area  -Patient sleeps in seated position to avoid shortness of breath s/p surgery    -Denies dysuria, hematuria  Negative CVS tendernes  -Muscle relaxants contraindicated given side effect of arrhythmia in the setting of recent cardiac event    -Encouraged continue tylenol and current pain regimen to control pain  Sleep disorder - Primary     Unable to sleep 2/2 pain and positioning s/p surgery  Given Melatonin 500 mcg and instructed patient to take it a few hours prior to sleep  Relevant Medications    Melatonin 500 MCG TBDP            Subjective:      Patient ID: Elham Arenas is a 70 y o  male  Patient presents for a follow up visit s/p a Type A dissection repair with replacement of ascending aortic with jordy arch reconstruction and aortic valve resuspension done on 12/15/2019  He still endorses chest pain and some SOB since the operation, but nothing new since his last visit with the cardiothoracic surgeon 4 days ago  He is able to walk, but only for short distances  He is expected to follow up with his CT surgeon in 1 month with a CT of his chest done to assess healing of the sternal wound  He newly endorses 3 days of back pain  He cannot sleep due to the pain  Denies dysuria or hematuria  When he moves towards that direction, he feels the pain more  He usually sits in a wheelchair  He also follows up with a nephrologist, who he last saw 9 days ago  He receives dialysis on T, Th, and Sat  The following portions of the patient's history were reviewed and updated as appropriate: He  has a past medical history of Arthritis, BPH without urinary obstruction, CKD (chronic kidney disease), stage III ([address] ), GERD (gastroesophageal reflux disease), Hyperlipidemia, and Hypertension  He  has a past surgical history that includes Appendectomy; pr reconstr total shoulder implant (Right, 12/5/2017); Fracture surgery;  Colonoscopy (2017); Shoulder surgery; Wrist surgery; pr ascend aorta graft w root replacment  valve conduit/coron reconstruct (N/A, 12/15/2019); IR Temp HD cath (12/23/2019); IR thoracentesis (12/24/2019); IR thoracentesis (12/27/2019); IR permacath placement (1/2/2020); and IR permacath exchange (1/6/2020)  He  reports that he quit smoking about 40 years ago  He smoked 1 00 pack per day  He has never used smokeless tobacco  He reports that he drank alcohol  He reports that he has current or past drug history  Current Outpatient Medications   Medication Sig Dispense Refill    acetaminophen (TYLENOL) 325 mg tablet Take 1-2 tabs q6h PRN mild fever/pain 90 tablet 0    aspirin 81 mg chewable tablet Chew 1 tablet (81 mg total) daily 90 tablet 0    atorvastatin (LIPITOR) 40 mg tablet Take 1 tablet (40 mg total) by mouth daily 90 tablet 0    bisacodyl (DULCOLAX) 5 mg EC tablet Take 2 tablets (10 mg total) by mouth daily as needed for constipation 60 tablet 0    metoprolol tartrate (LOPRESSOR) 25 mg tablet Take 0 5 tablets (12 5 mg total) by mouth every 12 (twelve) hours 180 tablet 0    omeprazole (PriLOSEC) 20 mg delayed release capsule take 1 capsule by mouth daily at bedtime 90 capsule 2    oxyCODONE (ROXICODONE) 5 mg immediate release tablet Take 1-2 pills PO q6h as needed for mild to moderate pain  Ongoing therapy for postoperative pain  30 tablet 0    polyethylene glycol (MIRALAX) 17 g packet Take 17 g by mouth daily IF PACKETS UNAVAILABLE, MAY TAKE OTC EQUIVALENT  USED AS DIRECTED  510 g 0    tamsulosin (FLOMAX) 0 4 mg 0 4 mg daily with dinner       warfarin (COUMADIN) 2 5 mg tablet Take 2 5mg daily unless otherwise directed based on INR  90 tablet 0    Melatonin 500 MCG TBDP Take 1 tablet (500 mcg total) by mouth daily as needed (insomnia)       No current facility-administered medications for this visit  He has No Known Allergies       Review of Systems   Constitutional: Negative for activity change, appetite change, chills, fatigue and fever  HENT: Negative for congestion and sore throat  Respiratory: Positive for shortness of breath  Cardiovascular: Positive for leg swelling  Negative for chest pain and palpitations  Gastrointestinal: Negative for abdominal distention, abdominal pain, diarrhea, nausea and vomiting  Genitourinary: Negative for difficulty urinating, dysuria and hematuria  Musculoskeletal: Positive for back pain  Skin: Negative for color change, pallor and rash  Allergic/Immunologic: Negative for environmental allergies, food allergies and immunocompromised state  Neurological: Negative for dizziness, weakness and headaches  Hematological: Negative for adenopathy  Psychiatric/Behavioral: Negative for agitation, confusion and dysphoric mood  Objective:      /80 (BP Location: Left arm, Patient Position: Sitting, Cuff Size: Large)   Pulse 74   Temp 97 5 °F (36 4 °C) (Tympanic)   Ht 5' 9" (1 753 m)   Wt 79 5 kg (175 lb 3 2 oz)   BMI 25 87 kg/m²          Physical Exam   Constitutional: He is oriented to person, place, and time  He appears well-developed and well-nourished  No distress  HENT:   Head: Normocephalic and atraumatic  Eyes: Pupils are equal, round, and reactive to light  Conjunctivae and EOM are normal  Right eye exhibits no discharge  Left eye exhibits no discharge  Neck: Normal range of motion  Neck supple  Cardiovascular: Normal rate, regular rhythm, normal heart sounds, intact distal pulses and normal pulses  No murmur heard  Pulmonary/Chest: Effort normal and breath sounds normal  No tachypnea  No respiratory distress  He exhibits no tenderness  Healing sternotomy scar   Abdominal: Soft  Bowel sounds are normal  He exhibits no distension  There is no tenderness  Musculoskeletal: Normal range of motion  He exhibits tenderness (L paraspinal muscle in lumbar area)  He exhibits no edema or deformity          Lumbar back: He exhibits tenderness  Left lower back TTP, LLE 2+ edema, RLE 1+ edema   Neurological: He is alert and oriented to person, place, and time  Skin: Skin is warm and dry  Capillary refill takes less than 2 seconds  Psychiatric: He has a normal mood and affect   His behavior is normal  Judgment and thought content normal

## 2020-01-29 ENCOUNTER — TELEPHONE (OUTPATIENT)
Dept: HEMATOLOGY ONCOLOGY | Facility: CLINIC | Age: 80
End: 2020-01-29

## 2020-01-29 NOTE — TELEPHONE ENCOUNTER
Pt unable to make it to today's hematology consult due to a home  visit  We rescheduled for Dr Clifford Crow on 2/26/20 in the [place] office

## 2020-01-30 ENCOUNTER — ANTICOAG VISIT (OUTPATIENT)
Dept: CARDIOLOGY CLINIC | Facility: CLINIC | Age: 80
End: 2020-01-30

## 2020-01-30 DIAGNOSIS — Z79.01 ANTICOAGULATION GOAL OF INR 2 TO 3: ICD-10-CM

## 2020-01-30 DIAGNOSIS — Z51.81 ANTICOAGULATION GOAL OF INR 2 TO 3: ICD-10-CM

## 2020-01-30 LAB — INR PPP: 2 (ref 0.84–1.19)

## 2020-02-03 ENCOUNTER — TELEPHONE (OUTPATIENT)
Dept: CARDIOLOGY CLINIC | Facility: CLINIC | Age: 80
End: 2020-02-03

## 2020-02-03 DIAGNOSIS — I71.01 DISSECTION OF THORACIC AORTA (HCC): Primary | ICD-10-CM

## 2020-02-03 DIAGNOSIS — I10 HYPERTENSION, UNSPECIFIED TYPE: ICD-10-CM

## 2020-02-03 RX ORDER — AMLODIPINE BESYLATE 5 MG/1
5 TABLET ORAL DAILY
Qty: 30 TABLET | Refills: 6 | Status: SHIPPED | OUTPATIENT
Start: 2020-02-03 | End: 2020-05-20 | Stop reason: SINTOL

## 2020-02-03 NOTE — TELEPHONE ENCOUNTER
Spouse called stating pt experienced CP and dizziness last evening /127, 160/110 currently 156/98  I moved up appt to see Dr Joya Guido for Wednesday, s/w NP who started Norvasc 5mg daily and asked pt to bring in their BP cuff with them to OV

## 2020-02-05 ENCOUNTER — OFFICE VISIT (OUTPATIENT)
Dept: CARDIOLOGY CLINIC | Facility: CLINIC | Age: 80
End: 2020-02-05
Payer: COMMERCIAL

## 2020-02-05 VITALS
DIASTOLIC BLOOD PRESSURE: 78 MMHG | SYSTOLIC BLOOD PRESSURE: 138 MMHG | HEIGHT: 69 IN | HEART RATE: 69 BPM | WEIGHT: 172 LBS | BODY MASS INDEX: 25.48 KG/M2

## 2020-02-05 DIAGNOSIS — I48.0 PAF (PAROXYSMAL ATRIAL FIBRILLATION) (HCC): ICD-10-CM

## 2020-02-05 DIAGNOSIS — I71.01 DISSECTION OF THORACIC AORTA (HCC): Primary | ICD-10-CM

## 2020-02-05 DIAGNOSIS — I10 BENIGN ESSENTIAL HYPERTENSION: Chronic | ICD-10-CM

## 2020-02-05 DIAGNOSIS — I12.9 BENIGN HYPERTENSION WITH CKD (CHRONIC KIDNEY DISEASE) STAGE III (HCC): Chronic | ICD-10-CM

## 2020-02-05 DIAGNOSIS — N18.30 BENIGN HYPERTENSION WITH CKD (CHRONIC KIDNEY DISEASE) STAGE III (HCC): Chronic | ICD-10-CM

## 2020-02-05 DIAGNOSIS — Z98.890 S/P AORTA REPAIR: ICD-10-CM

## 2020-02-05 PROCEDURE — 1111F DSCHRG MED/CURRENT MED MERGE: CPT | Performed by: INTERNAL MEDICINE

## 2020-02-05 PROCEDURE — 3008F BODY MASS INDEX DOCD: CPT | Performed by: INTERNAL MEDICINE

## 2020-02-05 PROCEDURE — 93000 ELECTROCARDIOGRAM COMPLETE: CPT | Performed by: INTERNAL MEDICINE

## 2020-02-05 PROCEDURE — 3075F SYST BP GE 130 - 139MM HG: CPT | Performed by: INTERNAL MEDICINE

## 2020-02-05 PROCEDURE — 99214 OFFICE O/P EST MOD 30 MIN: CPT | Performed by: INTERNAL MEDICINE

## 2020-02-05 PROCEDURE — 1036F TOBACCO NON-USER: CPT | Performed by: INTERNAL MEDICINE

## 2020-02-05 PROCEDURE — 1160F RVW MEDS BY RX/DR IN RCRD: CPT | Performed by: INTERNAL MEDICINE

## 2020-02-05 PROCEDURE — 4040F PNEUMOC VAC/ADMIN/RCVD: CPT | Performed by: INTERNAL MEDICINE

## 2020-02-05 PROCEDURE — 3078F DIAST BP <80 MM HG: CPT | Performed by: INTERNAL MEDICINE

## 2020-02-05 RX ORDER — FUROSEMIDE 40 MG/1
80 TABLET ORAL DAILY
COMMUNITY
Start: 2020-01-29 | End: 2020-03-10 | Stop reason: SDUPTHER

## 2020-02-05 NOTE — PROGRESS NOTES
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  BP control, currently doing well, continue amlodipine and metoprolol  I recommended cardiac rehab  He was concerned because he is already tired, and thinks this will make him even moreso, but I'm hopeful he would regain some strength with this  - PAF: today, looks to be in sinus with a long first degree AV block  He is on anticoagulation with warfarin  INR is therapeutic  During dialysis, report indicates his HR is regular as well  Continue metoprolol  He had QTc prolongation with amiodarone during the hospitalization, but was also receiving other medcations  QTc remains long at baseline here as well  If he requires AAD, will need to refer to EP  Check 48 hour Holter monitor     - CKD: on 3x weekly dialysis  History of Present Illness:     82-year-old man  He had a type a dissection in December, 2019  Postoperatively, acute kidney injury on chronic kidney disease requiring dialysis  He continues with 3 times a week dialysis  Postoperatively with paroxysmal atrial fibrillation  He had QT prolongation with amiodarone  He was in and out of atrial fibrillation, but was asymptomatic when he had it  He has been on warfarin for anticoagulation  He also had left pleural effusions which required 2 thoracenteses sees during hospitalization  Since his discharge, he saw nurse practitioner from Cardiology, also saw cardiac surgery    Has a CT scan planned for next month  He has been continuing with dialysis and saw the nephrologists as well  Care is being coordinated by his primary care physician  Comes to the office today for regular follow-up with me  Overall, he and his family tell me that he is fatigued  He has good days and bad days  Two days ago, he started feeling palpitations which lasted for a while  Today, he feels well  We also got contacted that his blood pressure was significantly elevated a few days ago  Amlodipine was added to his regimen  His blood pressure cuff does not seem accurate, as this was checked by the visiting nurses at home and did not correlate  I received a list of his blood pressures from the dialysis unit, and his blood pressures have been slightly elevated at times but not severely  There are some labile numbers  Postoperatively, he did have an infection and was hypotensive and was on midodrine when I saw him in the hospital   This has been stopped since  Problem List     Rotator cuff tear arthropathy, right (Chronic)    Secondary osteoarthritis of right shoulder    Aftercare following surgery of the musculoskeletal system    Benign essential hypertension (Chronic)    Abnormal TSH    Overweight (BMI 25 0-29  9)    Chronic midline back pain    Benign hypertension with CKD (chronic kidney disease) stage III (HCC) (Chronic)    Benign prostatic hyperplasia without lower urinary tract symptoms    Bradycardia    SOBOE (shortness of breath on exertion)    Dissection of thoracic aorta (HCC)    S/P aorta repair    Postoperative anemia due to acute blood loss    Anticoagulation goal of INR 2 to 3    AFSHIN (acute kidney injury) ([address] )    Hyperphosphatemia    Hypotension    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    Complication of vascular dialysis catheter    Pneumonia    Hyperlipidemia    Muscle spasm    Sleep disorder        Past Medical History:   Diagnosis Date    Arthritis     BPH without urinary obstruction     CKD (chronic kidney disease), stage III (HCC)     baseline 1 6-1 7    GERD (gastroesophageal reflux disease)     Hyperlipidemia     Hypertension      Social History     Tobacco Use    Smoking status: Former Smoker     Packs/day: 1 00     Last attempt to quit: 1980     Years since quittin 1    Smokeless tobacco: Never Used   Substance Use Topics    Alcohol use: Not Currently    Drug use: Not Currently      Family History   Problem Relation Age of Onset    No Known Problems Mother     Hypertension Father     Arthritis Family      Past Surgical History:   Procedure Laterality Date    APPENDECTOMY      COLONOSCOPY  2017    FRACTURE SURGERY      IR [address] EXCHANGE  2020    IR [address] PLACEMENT  2020    IR TEMP HD CATH  2019    IR THORACENTESIS  2019    IR THORACENTESIS  2019    NJ ASCEND AORTA GRAFT W ROOT REPLACMENT  VALVE CONDUIT/CORON RECONSTRUCT N/A 12/15/2019    Procedure: BENTALL PROCEDURE (ASCENDING AORTIC REPAIR) with 26mm Gelweave Graft;  Surgeon: Graciela Nickerson DO;  Location: [facility];  Service: Cardiac Surgery    NJ RECONSTR TOTAL SHOULDER IMPLANT Right 2017    Procedure: ARTHROPLASTY SHOULDER REVERSE with OPEN CYST EXCISION;  Surgeon: Herve Montague MD;  Location[facility];  Service: Orthopedics    SHOULDER SURGERY      WRIST SURGERY         Current Outpatient Medications:     acetaminophen (TYLENOL) 325 mg tablet, Take 1-2 tabs q6h PRN mild fever/pain, Disp: 90 tablet, Rfl: 0    amLODIPine (NORVASC) 5 mg tablet, Take 1 tablet (5 mg total) by mouth daily, Disp: 30 tablet, Rfl: 6    aspirin 81 mg chewable tablet, Chew 1 tablet (81 mg total) daily, Disp: 90 tablet, Rfl: 0    atorvastatin (LIPITOR) 40 mg tablet, Take 1 tablet (40 mg total) by mouth daily, Disp: 90 tablet, Rfl: 0    bisacodyl (DULCOLAX) 5 mg EC tablet, Take 2 tablets (10 mg total) by mouth daily as needed for constipation, Disp: 60 tablet, Rfl: 0    furosemide (LASIX) 40 mg tablet, Take 40 mg by mouth daily, Disp: , Rfl:     Melatonin 500 MCG TBDP, Take 1 tablet (500 mcg total) by mouth daily as needed (insomnia), Disp: , Rfl:     metoprolol tartrate (LOPRESSOR) 25 mg tablet, Take 0 5 tablets (12 5 mg total) by mouth every 12 (twelve) hours, Disp: 180 tablet, Rfl: 0    omeprazole (PriLOSEC) 20 mg delayed release capsule, take 1 capsule by mouth daily at bedtime, Disp: 90 capsule, Rfl: 2    oxyCODONE (ROXICODONE) 5 mg immediate release tablet, Take 1-2 pills PO q6h as needed for mild to moderate pain  Ongoing therapy for postoperative pain , Disp: 30 tablet, Rfl: 0    polyethylene glycol (MIRALAX) 17 g packet, Take 17 g by mouth daily IF PACKETS UNAVAILABLE, MAY TAKE OTC EQUIVALENT  USED AS DIRECTED , Disp: 510 g, Rfl: 0    tamsulosin (FLOMAX) 0 4 mg, 0 4 mg daily with dinner , Disp: , Rfl:     warfarin (COUMADIN) 2 5 mg tablet, Take 2 5mg daily unless otherwise directed based on INR , Disp: 90 tablet, Rfl: 0  No Known Allergies    Vitals:    02/05/20 1352   BP: 138/78   BP Location: Right arm   Patient Position: Sitting   Cuff Size: Standard   Pulse: 69   Weight: 78 kg (172 lb)   Height: 5' 9" (1 753 m)     Vitals:    02/05/20 1352   Weight: 78 kg (172 lb)      Height: 5' 9" (175 3 cm)   Body mass index is 25 4 kg/m²  Physical Exam:  GENERAL: Alert, well appearing, and in no distress  HEENT:  PERRL, EOMI, no scleral icterus, no conjunctival pallor  NECK:  Supple, No elevated JVP, no thyromegaly, no carotid bruits  HEART:  Regular rate and rhythm, normal S1/S2, no S3/S4, no murmur or rub  CHEST: Healing scar  LUNGS:  Clear to auscultation bilaterally  ABDOMEN:  Soft, non-tender, positive bowel sounds, no rebound or guarding  EXTREMITIES:  1+ LE edema bilaterally    NEURO: No focal deficits,  SKIN: Normal without suspicious lesions on exposed skin    ROS:  Positive for fatigue, palpitations, shortness of breath, cough/phlegm, labile blood pressure  Except as noted in HPI, is otherwise reviewed in detail and a 12 point review of systems is negative  Labs:  Lab Results   Component Value Date    K 4 5 01/07/2020     01/07/2020    CREATININE 4 91 (H) 01/07/2020    BUN 59 (H) 01/07/2020    CO2 29 01/07/2020    ALT 21 12/29/2019    AST 32 12/29/2019    INR 2 00 (A) 01/30/2020    GLUF 85 04/24/2019    HGBA1C 5 3 11/17/2017    WBC 11 88 (H) 01/06/2020    HGB 8 6 (L) 01/06/2020    HCT 27 0 (L) 01/06/2020     01/06/2020       No results found for: CHOL  Lab Results   Component Value Date    LDLCALC 115 (H) 03/08/2018    LDLCALC 116 (H) 04/25/2017    LDLCALC 131 (H) 01/09/2016     Lab Results   Component Value Date    HDL 43 03/08/2018    HDL 32 (L) 04/25/2017    HDL 38 (L) 01/09/2016     Lab Results   Component Value Date    TRIG 82 03/08/2018    TRIG 142 04/25/2017    TRIG 124 01/09/2016       Testing:  Echo:  LEFT VENTRICLE:  Systolic Function: normal  Ejection Fraction: 65%  Cavity size: normal    RIGHT VENTRICLE:  Systolic Function: normal   Cavity size moderately dilated  Hypertrophy (severe LVH) present  AORTIC VALVE:  Leaflets: normal and trileaflet  Leaflet motions normal and normal  Stenosis: none  Regurgitation: trace  MITRAL VALVE:  Leaflets: calcified and normal  Leaflet Motions: normal  Regurgitation: none  Stenosis: none  TRICUSPID VALVE:  Leaflets: normal, annulus severely dilated and dilate annulus  Leaflet Motions: restricted  Stenosis: none  Regurgitation: moderate  PULMONIC VALVE:  Leaflets: normal  Regurgitation: none  Stenosis: none  ASCENDING AORTA:  Size:  normal  Dissection present  AORTIC ARCH:  Size:  normal    OTHER AORTIC FINDINGS:   Hematoma noted that extends from distal arch to mid descending aorta, 0 7 cm in thickness  RIGHT ATRIUM:  Size:  dilated   No spontaneous echo contrast   LEFT ATRIUM:  Size: normal    LEFT ATRIAL APPENDAGE:  Size: normal    ATRIAL SEPTUM:  Intra-atrial septal morphology: normal    VENTRICULAR SEPTUM:  Intra-ventricular septum morphology: normal    OTHER FINDINGS:  Pericardium:  normal  Pleural Effusion:  none  POSTPROCEDURE  LEFT VENTRICLE: Unchanged   RIGHT VENTRICLE:   Systolic Function: mildly depressed  AORTIC VALVE:   Leaflets: native  Stenosis: none  Regurgitation: mild  MITRAL VALVE: Unchanged   TRICUSPID VALVE:   Leaflets: native  Regurgitation: systolic flow reversal in hepatic vein and severe  PULMONIC VALVE: Unchanged   ATRIA: Unchanged   AORTA:   OTHER AORTA FINDINGS: The proximal aorta has been replaced  The mural hematoma on the descending aorta is preserved  Brennan Newman REMOVAL:  Probe Removal: atraumatic  ECHOCARDIOGRAM COMMENTS:  Noted is the absence of right coronary ostia  The left coronary ostia appears to have two vessels arising from it  Confirmed surgically    EKG:  Sinus rhythm, 1st degree AV block   Nonspecific T wave inversions

## 2020-02-06 ENCOUNTER — ANTICOAG VISIT (OUTPATIENT)
Dept: CARDIOLOGY CLINIC | Facility: CLINIC | Age: 80
End: 2020-02-06

## 2020-02-06 DIAGNOSIS — Z79.01 ANTICOAGULATION GOAL OF INR 2 TO 3: ICD-10-CM

## 2020-02-06 DIAGNOSIS — Z51.81 ANTICOAGULATION GOAL OF INR 2 TO 3: ICD-10-CM

## 2020-02-06 LAB — INR PPP: 2.6 (ref 0.84–1.19)

## 2020-02-11 ENCOUNTER — OFFICE VISIT (OUTPATIENT)
Dept: CARDIAC SURGERY | Facility: CLINIC | Age: 80
End: 2020-02-11

## 2020-02-11 VITALS
HEART RATE: 81 BPM | DIASTOLIC BLOOD PRESSURE: 62 MMHG | WEIGHT: 173 LBS | TEMPERATURE: 96.3 F | BODY MASS INDEX: 25.62 KG/M2 | HEIGHT: 69 IN | RESPIRATION RATE: 14 BRPM | OXYGEN SATURATION: 93 % | SYSTOLIC BLOOD PRESSURE: 112 MMHG

## 2020-02-11 DIAGNOSIS — Z48.89 ENCOUNTER FOR POST SURGICAL WOUND CHECK: Primary | ICD-10-CM

## 2020-02-11 DIAGNOSIS — L53.9: Primary | ICD-10-CM

## 2020-02-11 PROCEDURE — 1160F RVW MEDS BY RX/DR IN RCRD: CPT | Performed by: NURSE PRACTITIONER

## 2020-02-11 PROCEDURE — 99024 POSTOP FOLLOW-UP VISIT: CPT | Performed by: NURSE PRACTITIONER

## 2020-02-11 PROCEDURE — 3078F DIAST BP <80 MM HG: CPT | Performed by: NURSE PRACTITIONER

## 2020-02-11 PROCEDURE — 4040F PNEUMOC VAC/ADMIN/RCVD: CPT | Performed by: NURSE PRACTITIONER

## 2020-02-11 PROCEDURE — 3008F BODY MASS INDEX DOCD: CPT | Performed by: NURSE PRACTITIONER

## 2020-02-11 PROCEDURE — 1111F DSCHRG MED/CURRENT MED MERGE: CPT | Performed by: NURSE PRACTITIONER

## 2020-02-11 PROCEDURE — 3074F SYST BP LT 130 MM HG: CPT | Performed by: NURSE PRACTITIONER

## 2020-02-11 NOTE — PROGRESS NOTES
POST OP FOLLOW UP VISIT    Procedure: S/P emergent replacement of ascending aorta with aggressive hemiarch reconstruction and aortic valve resuspension with a 26 mm Dacron graft secondary to acute type A aortic dioseection performed on 12/15/2019    History: Rachell Couch is a 70y o  year old male who presents to our office today for assessment of his chest incision  He is 8 weeks s/p type A aortic dissection repair  This AM he noted new redness over the sternal notch, upper end of his chest incision  He showed this new finding to his visiting nurse who contacted our office  Upon interview patient denies fever or chills; drainage or open areas along incision; also denies trauma to the site  He states it is slightly tender  He notices mild sternal movement  He states he is doing well otherwise  He had his HD treatment this AM      Vital Signs:   Vitals:    02/11/20 1400 02/11/20 1436   BP: 110/62 112/62   BP Location: Left arm Right arm   Cuff Size: Adult Adult   Pulse: 81    Resp: 14    Temp: (!) 96 3 °F (35 7 °C)    TempSrc: Oral    SpO2: 93%    Weight: 78 5 kg (173 lb)    Height: 5' 9" (1 753 m)        Home Medications:   Prior to Admission medications    Medication Sig Start Date End Date Taking?  Authorizing Provider   acetaminophen (TYLENOL) 325 mg tablet Take 1-2 tabs q6h PRN mild fever/pain 1/7/20  Yes Eber Sparks PA-C   amLODIPine (NORVASC) 5 mg tablet Take 1 tablet (5 mg total) by mouth daily 2/3/20  Yes EL Meredith   aspirin 81 mg chewable tablet Chew 1 tablet (81 mg total) daily 1/7/20  Yes Eber Sparks PA-C   atorvastatin (LIPITOR) 40 mg tablet Take 1 tablet (40 mg total) by mouth daily 1/7/20  Yes Eber Sparks PA-C   furosemide (LASIX) 40 mg tablet Take 40 mg by mouth daily 1/29/20  Yes Historical Provider, MD   Melatonin 500 MCG TBDP Take 1 tablet (500 mcg total) by mouth daily as needed (insomnia) 1/24/20  Yes Hitesh MD Olu   metoprolol tartrate (LOPRESSOR) 25 mg tablet Take 0 5 tablets (12 5 mg total) by mouth every 12 (twelve) hours 1/7/20  Yes Alona Zamorano PA-C   omeprazole (PriLOSEC) 20 mg delayed release capsule take 1 capsule by mouth daily at bedtime 4/13/18  Yes Antoni Campos MD   polyethylene glycol (MIRALAX) 17 g packet Take 17 g by mouth daily IF PACKETS UNAVAILABLE, MAY TAKE OTC EQUIVALENT  USED AS DIRECTED  1/7/20  Yes Alona Zamorano PA-C   tamsulosin (FLOMAX) 0 4 mg 0 4 mg daily with dinner  3/2/17  Yes Historical Provider, MD   warfarin (COUMADIN) 2 5 mg tablet Take 2 5mg daily unless otherwise directed based on INR  1/7/20  Yes Alona Zamorano PA-C   bisacodyl (DULCOLAX) 5 mg EC tablet Take 2 tablets (10 mg total) by mouth daily as needed for constipation 1/7/20 2/6/20  Alona Zamorano PA-C   oxyCODONE (ROXICODONE) 5 mg immediate release tablet Take 1-2 pills PO q6h as needed for mild to moderate pain  Ongoing therapy for postoperative pain  Patient not taking: Reported on 2/11/2020 1/7/20 2/11/20  Alona Zamorano PA-C       Physical Exam:  General: Alert,oriented, well developed, color good, no acute distress  HEENT/NECK:  PERRLA  No jugular venous distention  Cardiac:Regular rate and rhythm, No murmurs rubs or gallops  Pulmonary:Breath sounds clear bilaterally  Abdomen:  Non-tender, Non-distended  Positive bowel sounds  Upper extremities: 2+ radial pulses; brisk capillary refill  Lower extremities: Extremities warm/dry  PT/DP pules 2+ bilaterally  No edema B/L  Incisions: Sternum is stable  Incision is clean, dry, and intact  Small red bruise like patch over sternal notch  Musculoskeletal: MAEE, stable gait  Neuro: Alert and oriented X 3  Sensation is grossly intact  No focal deficits  Skin: Warm/Dry, without rashes or lesions      Lab Results:     Results from last 7 days   Lab Units 02/11/20  1208   WBC Thousand/uL 7 45   HEMOGLOBIN g/dL 8 4*   HEMATOCRIT % 28 2*   PLATELETS Thousands/uL 254           Invalid input(s): LABGLOM  Results from last 7 days Lab Units 02/06/20   INR  2 60*     Lab Results   Component Value Date    HGBA1C 5 3 11/17/2017     Lab Results   Component Value Date    TROPONINI <0 02 12/14/2019       Assessment: 8 weeks s/p Ascending aortic replacement due to acute type A aortic dissection  Here for chest incision check      Plan:     Mai Singh continues to make good progress in his recovery following aortic surgery  In the past 24 hours, he has developed a red area over the sternal notch (upper sternum) that appears to be more of a bruise than erythema or cellulitis  CBC drawn this AM at HD reveals anemia and no leukocytosis  At this point we recommend continued surveillance  Patient was instructed to call our office in the event he develops a fever, worsening redness, pain, swelling or drainage     Mai Singh is scheduled to see us in 2 weeks to follow up his progress after aortic surgery      EL Carballo  2/11/2020

## 2020-02-11 NOTE — PROGRESS NOTES
GARRICK called this AM to report erythema around superior area of chest incision with warmth, swelling and tenderness  Patient is s/p emergent ascending aortic replacement on 12/15/19  Patient has dialysis catheter in place, left subclavian  Patient has HD today at 1030  Instructed patient's daughter to bring patient to our office for a visit after dialysis today    Called [facility] and requested they draw a CBC

## 2020-02-12 ENCOUNTER — HOSPITAL ENCOUNTER (OUTPATIENT)
Dept: NON INVASIVE DIAGNOSTICS | Facility: CLINIC | Age: 80
Discharge: HOME | End: 2020-02-12
Payer: COMMERCIAL

## 2020-02-12 DIAGNOSIS — I48.0 PAF (PAROXYSMAL ATRIAL FIBRILLATION) (HCC): ICD-10-CM

## 2020-02-12 PROCEDURE — 93226 XTRNL ECG REC<48 HR SCAN A/R: CPT

## 2020-02-12 PROCEDURE — 93225 XTRNL ECG REC<48 HRS REC: CPT

## 2020-02-13 ENCOUNTER — ANTICOAG VISIT (OUTPATIENT)
Dept: CARDIOLOGY CLINIC | Facility: CLINIC | Age: 80
End: 2020-02-13

## 2020-02-13 DIAGNOSIS — Z79.01 ANTICOAGULATION GOAL OF INR 2 TO 3: ICD-10-CM

## 2020-02-13 DIAGNOSIS — Z51.81 ANTICOAGULATION GOAL OF INR 2 TO 3: ICD-10-CM

## 2020-02-13 LAB — INR PPP: 2.3 (ref 0.84–1.19)

## 2020-02-19 PROCEDURE — 93227 XTRNL ECG REC<48 HR R&I: CPT | Performed by: INTERNAL MEDICINE

## 2020-02-20 ENCOUNTER — ANTICOAG VISIT (OUTPATIENT)
Dept: CARDIOLOGY CLINIC | Facility: CLINIC | Age: 80
End: 2020-02-20

## 2020-02-20 DIAGNOSIS — Z51.81 ANTICOAGULATION GOAL OF INR 2 TO 3: ICD-10-CM

## 2020-02-20 DIAGNOSIS — Z79.01 ANTICOAGULATION GOAL OF INR 2 TO 3: ICD-10-CM

## 2020-02-20 LAB — INR PPP: 2.4 (ref 0.84–1.19)

## 2020-02-21 ENCOUNTER — TRANSCRIBE ORDERS (OUTPATIENT)
Dept: RADIOLOGY | Facility: HOSPITAL | Age: 80
End: 2020-02-21

## 2020-02-21 ENCOUNTER — TRANSCRIBE ORDERS (OUTPATIENT)
Dept: ADMINISTRATIVE | Facility: HOSPITAL | Age: 80
End: 2020-02-21

## 2020-02-21 ENCOUNTER — HOSPITAL ENCOUNTER (OUTPATIENT)
Dept: RADIOLOGY | Facility: HOSPITAL | Age: 80
Discharge: HOME | End: 2020-02-21
Payer: COMMERCIAL

## 2020-02-21 DIAGNOSIS — T81.32XD DISRUPTION OR DEHISCENCE OF CLOSURE OF STERNUM OR STERNOTOMY, SUBSEQUENT ENCOUNTER: ICD-10-CM

## 2020-02-21 DIAGNOSIS — N20.0 STONE, KIDNEY: Primary | ICD-10-CM

## 2020-02-21 DIAGNOSIS — Z98.890 S/P AORTA REPAIR: ICD-10-CM

## 2020-02-21 DIAGNOSIS — I71.01 DISSECTION OF THORACIC AORTA (HCC): ICD-10-CM

## 2020-02-21 PROCEDURE — 71250 CT THORAX DX C-: CPT

## 2020-02-24 ENCOUNTER — OFFICE VISIT (OUTPATIENT)
Dept: CARDIAC SURGERY | Facility: CLINIC | Age: 80
End: 2020-02-24

## 2020-02-24 ENCOUNTER — TELEPHONE (OUTPATIENT)
Dept: CARDIOLOGY CLINIC | Facility: CLINIC | Age: 80
End: 2020-02-24

## 2020-02-24 VITALS
TEMPERATURE: 96.8 F | WEIGHT: 177 LBS | SYSTOLIC BLOOD PRESSURE: 106 MMHG | BODY MASS INDEX: 26.22 KG/M2 | OXYGEN SATURATION: 100 % | DIASTOLIC BLOOD PRESSURE: 62 MMHG | HEART RATE: 70 BPM | RESPIRATION RATE: 14 BRPM | HEIGHT: 69 IN

## 2020-02-24 DIAGNOSIS — Z98.890 S/P AORTA REPAIR: Primary | ICD-10-CM

## 2020-02-24 DIAGNOSIS — Z48.89 ENCOUNTER FOR POSTOPERATIVE CARE: ICD-10-CM

## 2020-02-24 DIAGNOSIS — I71.01 DISSECTION OF THORACIC AORTA (HCC): ICD-10-CM

## 2020-02-24 DIAGNOSIS — N17.9 AKI (ACUTE KIDNEY INJURY) (HCC): Primary | ICD-10-CM

## 2020-02-24 DIAGNOSIS — M96.89 NONUNION OF STERNUM AFTER STERNOTOMY: ICD-10-CM

## 2020-02-24 PROCEDURE — 3008F BODY MASS INDEX DOCD: CPT | Performed by: NURSE PRACTITIONER

## 2020-02-24 PROCEDURE — 99024 POSTOP FOLLOW-UP VISIT: CPT | Performed by: NURSE PRACTITIONER

## 2020-02-24 PROCEDURE — 3074F SYST BP LT 130 MM HG: CPT | Performed by: NURSE PRACTITIONER

## 2020-02-24 PROCEDURE — 4040F PNEUMOC VAC/ADMIN/RCVD: CPT | Performed by: NURSE PRACTITIONER

## 2020-02-24 PROCEDURE — 3078F DIAST BP <80 MM HG: CPT | Performed by: NURSE PRACTITIONER

## 2020-02-24 PROCEDURE — 1160F RVW MEDS BY RX/DR IN RCRD: CPT | Performed by: NURSE PRACTITIONER

## 2020-02-24 PROCEDURE — 1111F DSCHRG MED/CURRENT MED MERGE: CPT | Performed by: NURSE PRACTITIONER

## 2020-02-24 NOTE — PROGRESS NOTES
POST OP FOLLOW UP VISIT    Procedure: S/P emergent replacement of ascending aorta with aggressive hemiarch reconstruction and aortic valve resuspension with a 26 mm Dacron graft secondary to acute Type A aortic dissection, performed on 12/15/19  History: Rachell Couch is a 70y o  year old male who presents to our office today for routine post op follow up  He is now 11 weeks s/p emergent ascending aortic replacement secondary to acute type Aortic dissection  He had a prolonged post op course, hospitalized for 24 days after surgery  He developed acute kidney failure requiring dialysis  After discharge he continued with 3 day a week HD however he reports today that his last HD treatment was Saturday, creatinine today stable at 2 55  He developed post op Afib and is on coumadin  He underwent L thoracentesis for 1 5 L serosanguineous fluid  He responded well to therapy and was discharged to home on 1/7/2020  He was seen in our office on 2/11/20 for chest incision inspection  His skin incision appeared to be healing well with an area of bruising was noted over the sternal notch  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  Today patient reports he is doing very well and feels much better  He is off dialysis currently  He reports he is walking independently without cane or walker and feels stronger  He denies any pain in the chest, SOB, GONZALES, lightheadedness, palpitations, GI disturbance, fever or chills  Recent non-contrast chest CT scan demonstrates  A moderate left pleural effusion, small right pleural effusion and 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 Authorizing Provider   acetaminophen (TYLENOL) 325 mg tablet Take 1-2 tabs q6h PRN mild fever/pain 1/7/20  Yes Viviana Brown PA-C   amLODIPine (NORVASC) 5 mg tablet Take 1 tablet (5 mg total) by mouth daily 2/3/20  Yes EL Hudson   aspirin 81 mg chewable tablet Chew 1 tablet (81 mg total) daily 1/7/20  Yes Viviana Brown PA-C   atorvastatin (LIPITOR) 40 mg tablet Take 1 tablet (40 mg total) by mouth daily 1/7/20  Yes Viviana Brown PA-C   furosemide (LASIX) 40 mg tablet Take 40 mg by mouth daily 1/29/20  Yes Historical Provider, MD   Melatonin 500 MCG TBDP Take 1 tablet (500 mcg total) by mouth daily as needed (insomnia) 1/24/20  Yes Shaina Ryder MD   metoprolol tartrate (LOPRESSOR) 25 mg tablet Take 0 5 tablets (12 5 mg total) by mouth every 12 (twelve) hours 1/7/20  Yes Viviana Brown PA-C   omeprazole (PriLOSEC) 20 mg delayed release capsule take 1 capsule by mouth daily at bedtime 4/13/18  Yes Bay Escalante MD   polyethylene glycol (MIRALAX) 17 g packet Take 17 g by mouth daily IF PACKETS UNAVAILABLE, MAY TAKE OTC EQUIVALENT  USED AS DIRECTED  1/7/20  Yes Viviana Brown PA-C   tamsulosin [facility]) 0 4 mg 0 4 mg daily with dinner  3/2/17  Yes Historical Provider, MD   warfarin (COUMADIN) 2 5 mg tablet Take 2 5mg daily unless otherwise directed based on INR  1/7/20  Yes Viviana Brown PA-C   bisacodyl (DULCOLAX) 5 mg EC tablet Take 2 tablets (10 mg total) by mouth daily as needed for constipation 1/7/20 2/6/20  Viviana Brown PA-C       Physical Exam:  General: Alert, oriented, well developed, no acute distress  Good color  HEENT/NECK:  PERRLA  No jugular venous distention  Cardiac:Regular rate and rhythm, No murmurs rubs or gallops  Pulmonary:right lung base slightly decreased, left lung base moderated decreased, fine crackles auscultated bilateral in mid lung  Abdomen:  Non-tender, Non-distended  Positive bowel sounds    Upper extremities: 2+ radial pulses; brisk capillary refill  Lower extremities: Extremities warm/dry  PT/DP pules 2+ bilaterally  1+ edema B/L  Incisions: Sternum is stable  Incision is clean, dry, and intact  Permacath site covered with clean dry dressing, no surrounding erythema  Musculoskeletal: MAEE, stable gait  Neuro: Alert and oriented X 3  Sensation is grossly intact  No focal deficits  Skin: Warm/Dry, without rashes or lesions  Lab Results:         Results from last 7 days   Lab Units 02/24/20  1017 02/20/20  1237   POTASSIUM mmol/L 5 0 4 4   CHLORIDE mmol/L 110* 112*   CO2 mmol/L 29 25   BUN mg/dL 44* 48*   CREATININE mg/dL 2 55* 2 59*   CALCIUM mg/dL 9 0 8 6     Results from last 7 days   Lab Units 02/20/20   INR  2 40*     Lab Results   Component Value Date    HGBA1C 5 3 11/17/2017     Lab Results   Component Value Date    TROPONINI <0 02 12/14/2019       Imaging Studies:     Non contrast CT scan 2/21/20:  Marked decrease in the previously noted  retrosternal collection which was extending into the mediastinum and abutting the pericardium  On the current study the residual area of soft tissue thickening, located at the level of the right ventricular   outflow tract measures 2 8 x 3 cm  On the previous study this collection in this area was measuring 3 6 x 6 5 cm     Resolution of the previously noted airspace consolidation in the both lung bases with small residual right effusion     Moderate-sized left effusion which is seen tracking into the left oblique fissure, increased , this may be a complicated effusion       I have personally reviewed pertinent films in PACS    Assessment: emergent ascending aortic dissection repair    Plan:     Oliver Sharpe continues to make good progress in his recovery following emergent type A aortic dissection repair  He is now 11 weeks post op  Incisions are well-healed  There is movement in is sternum and CT scan confirms non union but patient is asymptomatic  Weight and VS are stable  His kidney function has recovered with current creatinine at 2 55  H His last HD treatments was 2 days ago and is now on hold  He should continue sternal precautions with no strenuous activity or heavy lifting, above 10  Lb  Since patient is asymptomatic, we will hold off on repeat thoracentesis on the left or intervention for his sternum  We will follow him closely and see him back in 3 months  We will hold off on contrast imaging to assess his aortic repair until we are certain his kidney function remains stable  Patient's questions were answered to his satisfaction  Routine referral to gastroenterology for colonoscopy screening was not indicated, as the patient is over 78years old    Myra Skinner[address]  2/24/20

## 2020-02-24 NOTE — PROGRESS NOTES
Reviewed laboratory studies, weight is stable at 79 kilos  Stable renal function with a creatinine of 2 5  Repeat laboratory studies on Thursday  Daily weights  Decide later this week to see if we can avoid further hemodialysis    Discussed with the daughter

## 2020-02-24 NOTE — TELEPHONE ENCOUNTER
Spoke with patient's daughter Cristian Shepherd regarding test results  Advised for patient to continue current medications         ----- Message from Lopez Osborn MD sent at 2/24/2020 10:06 AM EST -----  Please let patient know his Holter monitor showed no afib  Continue current medications, no changes

## 2020-02-25 DIAGNOSIS — N17.9 AKI (ACUTE KIDNEY INJURY) (HCC): ICD-10-CM

## 2020-02-25 DIAGNOSIS — N17.9 ACUTE KIDNEY INJURY SUPERIMPOSED ON CHRONIC KIDNEY DISEASE (HCC): ICD-10-CM

## 2020-02-25 DIAGNOSIS — N18.9 ACUTE KIDNEY INJURY SUPERIMPOSED ON CHRONIC KIDNEY DISEASE (HCC): ICD-10-CM

## 2020-02-25 DIAGNOSIS — Z98.890 S/P AORTA REPAIR: ICD-10-CM

## 2020-02-25 DIAGNOSIS — I71.01 DISSECTION OF THORACIC AORTA (HCC): ICD-10-CM

## 2020-02-26 ENCOUNTER — TELEPHONE (OUTPATIENT)
Dept: CARDIOLOGY CLINIC | Facility: CLINIC | Age: 80
End: 2020-02-26

## 2020-02-26 NOTE — TELEPHONE ENCOUNTER
Patient daughter states he has been taking metoprolol tart 25 mg BID not the 1/2 tablet BID we prescribed  She states this dose keeps his BP under control      Ok to send in metoprolol tart 25 mg BID

## 2020-02-27 DIAGNOSIS — I71.01 DISSECTION OF THORACIC AORTA (HCC): ICD-10-CM

## 2020-02-27 DIAGNOSIS — N17.9 AKI (ACUTE KIDNEY INJURY) (HCC): Primary | ICD-10-CM

## 2020-02-27 DIAGNOSIS — N17.9 ACUTE KIDNEY INJURY SUPERIMPOSED ON CHRONIC KIDNEY DISEASE (HCC): ICD-10-CM

## 2020-02-27 DIAGNOSIS — Z98.890 S/P AORTA REPAIR: ICD-10-CM

## 2020-02-27 DIAGNOSIS — N17.9 AKI (ACUTE KIDNEY INJURY) (HCC): ICD-10-CM

## 2020-02-27 DIAGNOSIS — N18.9 ACUTE KIDNEY INJURY SUPERIMPOSED ON CHRONIC KIDNEY DISEASE (HCC): ICD-10-CM

## 2020-02-27 NOTE — PROGRESS NOTES
Overall renal function has stabilized with a creatinine of 2 37  Weight is stable at approximately 80 kg  Will plan to remove PermCath  Arrange follow-up appointment for next week

## 2020-03-03 DIAGNOSIS — I12.9 BENIGN HYPERTENSION WITH CKD (CHRONIC KIDNEY DISEASE) STAGE III (HCC): Primary | Chronic | ICD-10-CM

## 2020-03-03 DIAGNOSIS — N18.30 BENIGN HYPERTENSION WITH CKD (CHRONIC KIDNEY DISEASE) STAGE III (HCC): Primary | Chronic | ICD-10-CM

## 2020-03-04 ENCOUNTER — APPOINTMENT (OUTPATIENT)
Dept: LAB | Facility: HOSPITAL | Age: 80
End: 2020-03-04
Attending: INTERNAL MEDICINE
Payer: COMMERCIAL

## 2020-03-04 ENCOUNTER — HOSPITAL ENCOUNTER (OUTPATIENT)
Dept: RADIOLOGY | Facility: HOSPITAL | Age: 80
Discharge: HOME | End: 2020-03-04
Attending: INTERNAL MEDICINE
Payer: COMMERCIAL

## 2020-03-04 DIAGNOSIS — I71.01 DISSECTION OF THORACIC AORTA (HCC): ICD-10-CM

## 2020-03-04 DIAGNOSIS — N18.9 ACUTE KIDNEY INJURY SUPERIMPOSED ON CHRONIC KIDNEY DISEASE (HCC): ICD-10-CM

## 2020-03-04 DIAGNOSIS — I12.9 BENIGN HYPERTENSION WITH CKD (CHRONIC KIDNEY DISEASE) STAGE III (HCC): Chronic | ICD-10-CM

## 2020-03-04 DIAGNOSIS — N17.9 AKI (ACUTE KIDNEY INJURY) (HCC): ICD-10-CM

## 2020-03-04 DIAGNOSIS — L53.9: ICD-10-CM

## 2020-03-04 DIAGNOSIS — N18.30 BENIGN HYPERTENSION WITH CKD (CHRONIC KIDNEY DISEASE) STAGE III (HCC): Chronic | ICD-10-CM

## 2020-03-04 DIAGNOSIS — Z98.890 S/P AORTA REPAIR: ICD-10-CM

## 2020-03-04 DIAGNOSIS — N18.6 ESRD (END STAGE RENAL DISEASE) (HCC): ICD-10-CM

## 2020-03-04 DIAGNOSIS — N17.9 ACUTE KIDNEY INJURY SUPERIMPOSED ON CHRONIC KIDNEY DISEASE (HCC): ICD-10-CM

## 2020-03-04 LAB
ALBUMIN SERPL BCP-MCNC: 2.7 G/DL (ref 3.5–5)
ANION GAP SERPL CALCULATED.3IONS-SCNC: 8 MMOL/L (ref 4–13)
BASOPHILS # BLD AUTO: 0.02 THOUSANDS/ΜL (ref 0–0.1)
BASOPHILS NFR BLD AUTO: 0 % (ref 0–1)
BUN SERPL-MCNC: 52 MG/DL (ref 5–25)
CALCIUM SERPL-MCNC: 8.9 MG/DL (ref 8.3–10.1)
CHLORIDE SERPL-SCNC: 108 MMOL/L (ref 100–108)
CO2 SERPL-SCNC: 28 MMOL/L (ref 21–32)
CREAT SERPL-MCNC: 2.21 MG/DL (ref 0.6–1.3)
CREAT UR-MCNC: 167 MG/DL
EOSINOPHIL # BLD AUTO: 0.28 THOUSAND/ΜL (ref 0–0.61)
EOSINOPHIL NFR BLD AUTO: 3 % (ref 0–6)
ERYTHROCYTE [DISTWIDTH] IN BLOOD BY AUTOMATED COUNT: 15.6 % (ref 11.6–15.1)
GFR SERPL CREATININE-BSD FRML MDRD: 28 ML/MIN/1.73SQ M
GLUCOSE P FAST SERPL-MCNC: 95 MG/DL (ref 65–99)
HCT VFR BLD AUTO: 30.6 % (ref 36.5–49.3)
HGB BLD-MCNC: 9.1 G/DL (ref 12–17)
IMM GRANULOCYTES # BLD AUTO: 0.02 THOUSAND/UL (ref 0–0.2)
IMM GRANULOCYTES NFR BLD AUTO: 0 % (ref 0–2)
INR PPP: 2.41 (ref 0.84–1.19)
LYMPHOCYTES # BLD AUTO: 3.22 THOUSANDS/ΜL (ref 0.6–4.47)
LYMPHOCYTES NFR BLD AUTO: 36 % (ref 14–44)
MAGNESIUM SERPL-MCNC: 2.4 MG/DL (ref 1.6–2.6)
MCH RBC QN AUTO: 27.1 PG (ref 26.8–34.3)
MCHC RBC AUTO-ENTMCNC: 29.7 G/DL (ref 31.4–37.4)
MCV RBC AUTO: 91 FL (ref 82–98)
MONOCYTES # BLD AUTO: 1.42 THOUSAND/ΜL (ref 0.17–1.22)
MONOCYTES NFR BLD AUTO: 16 % (ref 4–12)
NEUTROPHILS # BLD AUTO: 4.03 THOUSANDS/ΜL (ref 1.85–7.62)
NEUTS SEG NFR BLD AUTO: 45 % (ref 43–75)
NRBC BLD AUTO-RTO: 0 /100 WBCS
PHOSPHATE SERPL-MCNC: 4.8 MG/DL (ref 2.3–4.1)
PLATELET # BLD AUTO: 319 THOUSANDS/UL (ref 149–390)
PMV BLD AUTO: 10 FL (ref 8.9–12.7)
POTASSIUM SERPL-SCNC: 3.8 MMOL/L (ref 3.5–5.3)
PROT UR-MCNC: 61 MG/DL
PROT/CREAT UR: 0.37 MG/G{CREAT} (ref 0–0.1)
PROTHROMBIN TIME: 25.7 SECONDS (ref 11.6–14.5)
PTH-INTACT SERPL-MCNC: 105.1 PG/ML (ref 18.4–80.1)
RBC # BLD AUTO: 3.36 MILLION/UL (ref 3.88–5.62)
SODIUM SERPL-SCNC: 144 MMOL/L (ref 136–145)
WBC # BLD AUTO: 8.99 THOUSAND/UL (ref 4.31–10.16)

## 2020-03-04 PROCEDURE — 80069 RENAL FUNCTION PANEL: CPT | Performed by: INTERNAL MEDICINE

## 2020-03-04 PROCEDURE — 84156 ASSAY OF PROTEIN URINE: CPT | Performed by: INTERNAL MEDICINE

## 2020-03-04 PROCEDURE — 82570 ASSAY OF URINE CREATININE: CPT | Performed by: INTERNAL MEDICINE

## 2020-03-04 PROCEDURE — 83970 ASSAY OF PARATHORMONE: CPT | Performed by: INTERNAL MEDICINE

## 2020-03-04 PROCEDURE — 85025 COMPLETE CBC W/AUTO DIFF WBC: CPT

## 2020-03-04 PROCEDURE — 36589 REMOVAL TUNNELED CV CATH: CPT | Performed by: RADIOLOGY

## 2020-03-04 PROCEDURE — 36589 REMOVAL TUNNELED CV CATH: CPT

## 2020-03-04 PROCEDURE — 83735 ASSAY OF MAGNESIUM: CPT

## 2020-03-04 PROCEDURE — 85610 PROTHROMBIN TIME: CPT

## 2020-03-04 PROCEDURE — 36415 COLL VENOUS BLD VENIPUNCTURE: CPT | Performed by: INTERNAL MEDICINE

## 2020-03-04 NOTE — BRIEF OP NOTE (RAD/CATH)
[facility] REMOVAL Procedure Note    PATIENT NAME: Farhan Sharpe  : 1940  MRN: [id number]    Pre-op Diagnosis:   1  AFSHIN (acute kidney injury) ([facility] [address] )      Post-op Diagnosis:   1   AFSHIN (acute kidney injury) [facility])        Surgeon:   Shawn Key MD    Estimated Blood Loss: 1 mL    Findings: Successful right IJV tunneled HD catheter removal     Specimens: None    Complications:  None    Anesthesia: Local    Shawn Key MD     Date: 3/4/2020  Time: 12:23 PM

## 2020-03-04 NOTE — DISCHARGE INSTRUCTIONS
Perma-cath Removal   WHAT YOU NEED TO KNOW:   A perma-cath is a catheter placed through a vein into or near your right atrium  Your right atrium is the right upper chamber of your heart  A perma-cath is used for dialysis in an emergency or until a long-term device is ready to use  Or you no longer need dialysis  DISCHARGE INSTRUCTIONS:   Call 911 for any of the following:   · You feel lightheaded, short of breath, and have chest pain  · You start bleeding    Contact Interventional Radiology at [phone] Slava PATIENTS: Contact Interventional Radiology at [phone]) Rosalieshiva Soni PATIENTS: Contact Interventional Radiology at [phone]) if:  · Blood soaks through your bandage  · You have new swelling in your arm, neck, face, or chest on your right side  · Your bruises or pain get worse  · You have a fever or chills  · Persistent nausea or vomiting  · Your incision is red, swollen, or draining pus  · You have questions or concerns about your condition or care  Self-care:   · Resume your normal diet  Small sips of flat soda will help with nausea  · Keep your dressings dry  Do not get your perma-cath site wet until the incision closes  You may take a tub bath, but do not get your dressings wet  Water in your wound can cause bacteria to grow and cause an infection  If your dressing gets wet, remove the wet dressing and apply a dry bandaid  Keep it covered until the incision closes  This should only take a few days to heal  Do not use soaps or ointments  · Immediately after your catheter is removed, no strenuous activity for twenty four hours and stay in an upright sitting position for two hours     Follow up with your healthcare provider as directed: Write down your questions so you remember to ask them during your visits

## 2020-03-04 NOTE — H&P
Interventional Radiology  History and Physical 3/4/2020     History of Present Illness:  70year old male with history of AFSHIN with return of kidney function presents for removal of tunneled HD catheter  Past Medical History:   Diagnosis Date    Arthritis     BPH without urinary obstruction     CKD (chronic kidney disease), stage III (HCC)     baseline 1 6-1 7    GERD (gastroesophageal reflux disease)     Hyperlipidemia     Hypertension         Past Surgical History:   Procedure Laterality Date    APPENDECTOMY      COLONOSCOPY  2017    FRACTURE SURGERY      [facility] EXCHANGE  2020    [facility] PLACEMENT  2020    IR TEMP HD CATH  2019    IR THORACENTESIS  2019    IR THORACENTESIS  2019    RI ASCEND AORTA GRAFT W ROOT REPLACMENT  VALVE CONDUIT/CORON RECONSTRUCT N/A 12/15/2019    Procedure: BENTALL PROCEDURE (ASCENDING AORTIC REPAIR) with 26mm Gelweave Graft;  Surgeon: Adriano France DO;  [facility];  Service: Cardiac Surgery    RI RECONSTR TOTAL SHOULDER IMPLANT Right 2017    Procedure: ARTHROPLASTY SHOULDER REVERSE with OPEN CYST EXCISION;  Surgeon: Reynaldo Tee MD;  [facility];  Service: Orthopedics    SHOULDER SURGERY      WRIST SURGERY          Social History     Tobacco Use   Smoking Status Former Smoker    Packs/day: 1 00    Last attempt to quit: Ana Ion Years since quittin 2   Smokeless Tobacco Never Used        Social History     Substance and Sexual Activity   Alcohol Use Not Currently        Social History     Substance and Sexual Activity   Drug Use Not Currently        No Known Allergies    Current Outpatient Medications   Medication Sig Dispense Refill    acetaminophen (TYLENOL) 325 mg tablet Take 1-2 tabs q6h PRN mild fever/pain 90 tablet 0    amLODIPine (NORVASC) 5 mg tablet Take 1 tablet (5 mg total) by mouth daily 30 tablet 6    aspirin 81 mg chewable tablet Chew 1 tablet (81 mg total) daily 90 tablet 0    atorvastatin (LIPITOR) 40 mg tablet Take 1 tablet (40 mg total) by mouth daily 90 tablet 0    bisacodyl (DULCOLAX) 5 mg EC tablet Take 2 tablets (10 mg total) by mouth daily as needed for constipation 60 tablet 0    furosemide (LASIX) 40 mg tablet Take 40 mg by mouth daily      Melatonin 500 MCG TBDP Take 1 tablet (500 mcg total) by mouth daily as needed (insomnia)      metoprolol tartrate (LOPRESSOR) 25 mg tablet Take 1 tablet (25 mg total) by mouth every 12 (twelve) hours 180 tablet 0    omeprazole (PriLOSEC) 20 mg delayed release capsule take 1 capsule by mouth daily at bedtime 90 capsule 2    polyethylene glycol (MIRALAX) 17 g packet Take 17 g by mouth daily IF PACKETS UNAVAILABLE, MAY TAKE OTC EQUIVALENT  USED AS DIRECTED  510 g 0    tamsulosin (FLOMAX) 0 4 mg 0 4 mg daily with dinner       warfarin (COUMADIN) 2 5 mg tablet Take 2 5mg daily unless otherwise directed based on INR  90 tablet 0     No current facility-administered medications for this encounter  Objective:     Physical Exam    Const: NAD  Right chest: Tunneled HD catheter in place    Lab Results   Component Value Date    WBC 7 45 02/11/2020    HGB 8 4 (L) 02/11/2020    HCT 28 2 (L) 02/11/2020    MCV 94 02/11/2020     02/11/2020     Lab Results   Component Value Date    INR 2 40 (A) 02/20/2020    INR 2 30 (A) 02/13/2020    INR 2 60 (A) 02/06/2020    PROTIME 19 3 (H) 01/07/2020    PROTIME 18 1 (H) 01/06/2020    PROTIME 18 0 (H) 01/05/2020     Lab Results   Component Value Date    PTT 41 (H) 12/15/2019     I have personally reviewed pertinent imaging and laboratory results  Assessment/Plan:  - Right IJV tunneled HD catheter in place

## 2020-03-05 ENCOUNTER — OFFICE VISIT (OUTPATIENT)
Dept: NEPHROLOGY | Facility: CLINIC | Age: 80
End: 2020-03-05
Payer: COMMERCIAL

## 2020-03-05 ENCOUNTER — ANTICOAG VISIT (OUTPATIENT)
Dept: CARDIOLOGY CLINIC | Facility: CLINIC | Age: 80
End: 2020-03-05

## 2020-03-05 VITALS
HEART RATE: 68 BPM | HEIGHT: 68 IN | DIASTOLIC BLOOD PRESSURE: 82 MMHG | BODY MASS INDEX: 27.01 KG/M2 | WEIGHT: 178.2 LBS | SYSTOLIC BLOOD PRESSURE: 126 MMHG

## 2020-03-05 DIAGNOSIS — D63.1 ANEMIA OF CHRONIC RENAL FAILURE, STAGE 3 (MODERATE) (HCC): ICD-10-CM

## 2020-03-05 DIAGNOSIS — N18.30 ANEMIA OF CHRONIC RENAL FAILURE, STAGE 3 (MODERATE) (HCC): ICD-10-CM

## 2020-03-05 DIAGNOSIS — N18.30 CHRONIC KIDNEY DISEASE, STAGE III (MODERATE) (HCC): ICD-10-CM

## 2020-03-05 DIAGNOSIS — E87.70 HYPERVOLEMIA, UNSPECIFIED HYPERVOLEMIA TYPE: ICD-10-CM

## 2020-03-05 DIAGNOSIS — N17.9 AKI (ACUTE KIDNEY INJURY) (HCC): Primary | ICD-10-CM

## 2020-03-05 DIAGNOSIS — E83.9 CHRONIC KIDNEY DISEASE-MINERAL AND BONE DISORDER: ICD-10-CM

## 2020-03-05 DIAGNOSIS — N18.9 CHRONIC KIDNEY DISEASE-MINERAL AND BONE DISORDER: ICD-10-CM

## 2020-03-05 DIAGNOSIS — I48.0 PAROXYSMAL ATRIAL FIBRILLATION (HCC): Primary | ICD-10-CM

## 2020-03-05 DIAGNOSIS — M89.9 CHRONIC KIDNEY DISEASE-MINERAL AND BONE DISORDER: ICD-10-CM

## 2020-03-05 DIAGNOSIS — I12.9 BENIGN HYPERTENSION WITH CKD (CHRONIC KIDNEY DISEASE) STAGE III (HCC): ICD-10-CM

## 2020-03-05 DIAGNOSIS — N18.30 BENIGN HYPERTENSION WITH CKD (CHRONIC KIDNEY DISEASE) STAGE III (HCC): ICD-10-CM

## 2020-03-05 DIAGNOSIS — Z79.01 ANTICOAGULATION GOAL OF INR 2 TO 3: ICD-10-CM

## 2020-03-05 DIAGNOSIS — Z51.81 ANTICOAGULATION GOAL OF INR 2 TO 3: ICD-10-CM

## 2020-03-05 LAB — INR PPP: 2.8 (ref 0.84–1.19)

## 2020-03-05 PROCEDURE — 4040F PNEUMOC VAC/ADMIN/RCVD: CPT | Performed by: PHYSICIAN ASSISTANT

## 2020-03-05 PROCEDURE — 1036F TOBACCO NON-USER: CPT | Performed by: PHYSICIAN ASSISTANT

## 2020-03-05 PROCEDURE — 1160F RVW MEDS BY RX/DR IN RCRD: CPT | Performed by: PHYSICIAN ASSISTANT

## 2020-03-05 PROCEDURE — 3074F SYST BP LT 130 MM HG: CPT | Performed by: PHYSICIAN ASSISTANT

## 2020-03-05 PROCEDURE — 3008F BODY MASS INDEX DOCD: CPT | Performed by: PHYSICIAN ASSISTANT

## 2020-03-05 PROCEDURE — 1111F DSCHRG MED/CURRENT MED MERGE: CPT | Performed by: PHYSICIAN ASSISTANT

## 2020-03-05 PROCEDURE — 3079F DIAST BP 80-89 MM HG: CPT | Performed by: PHYSICIAN ASSISTANT

## 2020-03-05 PROCEDURE — 99214 OFFICE O/P EST MOD 30 MIN: CPT | Performed by: PHYSICIAN ASSISTANT

## 2020-03-05 NOTE — PROGRESS NOTES
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  Was started on dialysis 12/23/19 but recently with signs of recovery and dialysis stopped  Creatinine continues to slowly improve and is down to 2 2 yesterday   · Now off HD--last treatment on 2/20/2020  · Weights stable   · Good urine output   2  CKD III: prior baseline creatinine 1 4-1 8  Unsure what his new baseline will be after this severe episode of AFSHIN   · Last UPC ratio 0 37g   · Discussed saving non-dominant arm for possible future dialysis   3  Hypertension: Bp well controlled   4  Volume overload: does have edema but improving  per pt  CT chest showed small R effusion and moderate L effusion  · Currently on lasix 80mg daily (per pharmacy) and weights are stable  · Continue current lasix and call if edema or SOB worsen or weights increase more than 3lb in day or 5lb in a week   · Low sodium diet   5  Anemia of CKD: hgb 9 1  Continue to trend   6  Secondary Hyperparathyroid: last  1 and phos 4 8  7  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    Continue to follow daily weights at home and call if gain >3lb in a day or 5lb in a week   Repeat labs in 1 month   Follow up in 2 months with Dr Cari Burkett     HPI: Uday Urban is a 70 y o  male who is here for AFSHIN/CKD follow up  Patient was hospitalized in Dec 2019 with aortic dissection with intramural hematoma s/p repair  He developed AFSHIN related to contrast nephropathy and post op ATN and was dialysis dependant  Recently his renal function started improving and dialysis was stopped on 2/20/2020  He has been on diuretics and reports that his weights at home are between 174-176lb and stable  He does have some edema but it is stable/slihgtly improving  He has some SOB when he walks a lot but none at rest or with conversation  He is urinating well   Overall he is feeling much better and feels that he is stronger and his energy is improving  ROS:   A complete review of systems was done  Pertinent positives and negatives as noted in the HPI, otherwise the review of systems is negative  Allergies: Patient has no known allergies  Medications:   Current Outpatient Medications:     acetaminophen (TYLENOL) 325 mg tablet, Take 1-2 tabs q6h PRN mild fever/pain, Disp: 90 tablet, Rfl: 0    amLODIPine (NORVASC) 5 mg tablet, Take 1 tablet (5 mg total) by mouth daily, Disp: 30 tablet, Rfl: 6    aspirin 81 mg chewable tablet, Chew 1 tablet (81 mg total) daily, Disp: 90 tablet, Rfl: 0    atorvastatin (LIPITOR) 40 mg tablet, Take 1 tablet (40 mg total) by mouth daily, Disp: 90 tablet, Rfl: 0    bisacodyl (DULCOLAX) 5 mg EC tablet, Take 2 tablets (10 mg total) by mouth daily as needed for constipation, Disp: 60 tablet, Rfl: 0    furosemide (LASIX) 40 mg tablet, Take 80 mg by mouth daily , Disp: , Rfl:     Melatonin 500 MCG TBDP, Take 1 tablet (500 mcg total) by mouth daily as needed (insomnia), Disp: , Rfl:     metoprolol tartrate (LOPRESSOR) 25 mg tablet, Take 1 tablet (25 mg total) by mouth every 12 (twelve) hours, Disp: 180 tablet, Rfl: 0    omeprazole (PriLOSEC) 20 mg delayed release capsule, take 1 capsule by mouth daily at bedtime, Disp: 90 capsule, Rfl: 2    polyethylene glycol (MIRALAX) 17 g packet, Take 17 g by mouth daily IF PACKETS UNAVAILABLE, MAY TAKE OTC EQUIVALENT   USED AS DIRECTED , Disp: 510 g, Rfl: 0    warfarin (COUMADIN) 2 5 mg tablet, Take 2 5mg daily unless otherwise directed based on INR , Disp: 90 tablet, Rfl: 0    tamsulosin (FLOMAX) 0 4 mg, 0 4 mg daily with dinner , Disp: , Rfl:     Past Medical History:   Diagnosis Date    Arthritis     BPH without urinary obstruction     CKD (chronic kidney disease), stage III (HCC)     baseline 1 6-1 7    GERD (gastroesophageal reflux disease)     Hyperlipidemia     Hypertension      Past Surgical History: Procedure Laterality Date    APPENDECTOMY      COLONOSCOPY  2017    FRACTURE SURGERY      [facility] EXCHANGE  1/6/2020    [facility] PLACEMENT  1/2/2020    [facility] REMOVAL  3/4/2020    IR TEMP HD CATH  12/23/2019    IR THORACENTESIS  12/24/2019    IR THORACENTESIS  12/27/2019    MS ASCEND AORTA GRAFT W ROOT REPLACMENT  VALVE CONDUIT/CORON RECONSTRUCT N/A 12/15/2019    Procedure: BENTALL PROCEDURE (ASCENDING AORTIC REPAIR) with 26mm Gelweave Graft;  Surgeon: Jordin Burgos DO;  [facility];  Service: Cardiac Surgery    MS RECONSTR TOTAL SHOULDER IMPLANT Right 12/5/2017    Procedure: ARTHROPLASTY SHOULDER REVERSE with OPEN CYST EXCISION;  Surgeon: Charmaine Dasilva MD;  [facility];  Service: Orthopedics    SHOULDER SURGERY      WRIST SURGERY       Family History   Problem Relation Age of Onset    No Known Problems Mother     Hypertension Father     Arthritis Family       reports that he quit smoking about 40 years ago  He smoked 1 00 pack per day  He has never used smokeless tobacco  He reports that he drank alcohol  He reports that he has current or past drug history  Physical Exam:   Vitals:    03/05/20 1127   BP: 126/82   Pulse: 68   Weight: 80 8 kg (178 lb 3 2 oz)   Height: 5' 8" (1 727 m)     Body mass index is 27 1 kg/m²      General: no acute distress   Eyes: conjunctivae pink, anicteric sclerae  ENT: mucous membranes moist  Neck: supple, no JVD  Chest: few crackles at bases   CVS: regular rate and rhythm   Abdomen: soft, non-tender, non-distended  Extremities: +2 lower extremity edema   Skin: no rash  Neuro: awake and alert       Lab Results:  Results for orders placed or performed in visit on 03/05/20   Protime-INR   Result Value Ref Range    INR 2 80 (A) 0 84 - 1 19       Results from last 7 days   Lab Units 03/04/20  1245   WBC Thousand/uL 8 99   HEMOGLOBIN g/dL 9 1*   HEMATOCRIT % 30 6*   PLATELETS Thousands/uL 319   SODIUM mmol/L 144   POTASSIUM mmol/L 3 8 CHLORIDE mmol/L 108   CO2 mmol/L 28   BUN mg/dL 52*   CREATININE mg/dL 2 21*   CALCIUM mg/dL 8 9   MAGNESIUM mg/dL 2 4   PHOSPHORUS mg/dL 4 8*         Portions of the record may have been created with voice recognition software  Occasional wrong word or "sound a like" substitutions may have occurred due to the inherent limitations of voice recognition software  Read the chart carefully and recognize, using context, where substitutions have occurred  If you have any questions, please contact the dictating provider

## 2020-03-10 DIAGNOSIS — I12.9 BENIGN HYPERTENSION WITH CKD (CHRONIC KIDNEY DISEASE) STAGE III (HCC): Chronic | ICD-10-CM

## 2020-03-10 DIAGNOSIS — N17.9 AKI (ACUTE KIDNEY INJURY) (HCC): Primary | ICD-10-CM

## 2020-03-10 DIAGNOSIS — N18.30 BENIGN HYPERTENSION WITH CKD (CHRONIC KIDNEY DISEASE) STAGE III (HCC): Chronic | ICD-10-CM

## 2020-03-10 RX ORDER — FUROSEMIDE 40 MG/1
80 TABLET ORAL DAILY
Qty: 90 TABLET | Refills: 3 | Status: SHIPPED | OUTPATIENT
Start: 2020-03-10 | End: 2020-04-17 | Stop reason: SDUPTHER

## 2020-03-10 NOTE — PROGRESS NOTES
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  Total protein 7 5, albumin 3 1, calcium 9 3 12/2020  Reviewed with patient his family that this likely represents MGUS  Additional labs requested  Follow-up thereafter for review  2   CKD, AFSHIN, contrast nephropathy, post-op ATN  Dialysis 12/2019 - 2/2020       3   Blood loss anemia  Aortic dissection with intramural hematoma s/p repair 12/14/2020  Hemoglobin slowly improving  He was taking oral iron previously  He is advised to again take oral iron once to twice daily  Will check iron levels  4   Atrial fibrillation on coumadin     5  Persistent dry cough; moderate sized left pleural effusion identified on CT 2/21/2020  Clinically some wheezing without pronounced decreased breath sounds  Patient stable  Thoracentesis deferred  HPI:  Jeffrey Smith is a 70 y o  seen for initial consultation 3/11/2020 at the referral of [address] [place] [address],[address]  [address] accompanied by his wife and daughter regarding abnormal SPEP  Patient was hospitalized 12/14/19 - 1/7/2020 regarding aortic dissection with intramural hematoma s/p repair  He developed AFSHIN related to contrast nephropathy and post op ATN and was dialysis dependant  His renal function started improving and dialysis was stopped on 2/20/2020  He was discharged on coumadin 2nd to atrial fibrillation to be managed at [facility] office  12/29/19:  Urine immunofixation shows no monoclonal gammopathy  Serum immunofixation shows a monoclonal gammopathy identified as IgG lambda (0 18 g/dL)    12/14/19: hemoglobin 14 8 with MCV 92, WBC 9 07, platelet count 778    cr 1 86  Total protein 7 5, albumin 3 1, calcium 9 3    1/2016 - 4/24/2019:  Cr 1 7 range      March 4/20/2020 hemoglobin 9 1, MCV of 91, white blood cell count 8 99, 45% neutrophils, 36% lymphocytes, 16% monocytes, platelet count 042,117    He had been taking oral iron without side effects but reports that he ran out  Still fatigued from his baseline prior to his surgery  Denies any dizziness or lightheadedness  Denies any melena or hematochezia  Moving his bowels without difficulty  Denies any fevers or chills  He does have a dry cough  No increased bruising or bleeding  Test Results:      Labs:   Lab Results   Component Value Date    HGB 9 1 (L) 03/04/2020    HCT 30 6 (L) 03/04/2020    MCV 91 03/04/2020     03/04/2020    WBC 8 99 03/04/2020    NRBC 0 03/04/2020    BANDSPCT 1 01/04/2020    ATYLMPCT 2 (H) 01/03/2020     Lab Results   Component Value Date    K 3 8 03/04/2020     03/04/2020    CO2 28 03/04/2020    BUN 52 (H) 03/04/2020    CREATININE 2 21 (H) 03/04/2020    GLUCOSE 95 12/15/2019    GLUF 95 03/04/2020    CALCIUM 8 9 03/04/2020    AST 32 12/29/2019    ALT 21 12/29/2019    ALKPHOS 154 (H) 12/29/2019    EGFR 28 03/04/2020           Imaging:   Ct Chest Wo Contrast    Result Date: 2/24/2020  Narrative: CT CHEST WITHOUT IV CONTRAST INDICATION:   Z98 890: Other specified postprocedural states I71 01: Dissection of thoracic aorta T81  32XD: Disruption of internal operation (surgical) wound, not elsewhere classified, subsequent encounter  COMPARISON:  None  TECHNIQUE: CT examination of the chest was performed without intravenous contrast   Axial, sagittal, and coronal 2D reformatted images were created from the source data and submitted for interpretation  Radiation dose length product (DLP) for this visit:  246 52 mGy-cm   This examination, like all CT scans performed in the [facility], was performed utilizing techniques to minimize radiation dose exposure, including the use of iterative  reconstruction and automated exposure control   FINDINGS: LUNGS:  Mild mosaic attenuation of the lung parenchyma noted There is resolution of the previously noted airspace consolidation right lower lung The left basilar consolidation seen on the previous study has resolved PLEURA:  Small right effusion seen Moderate left effusion seen There is tracking of the fluid into the left oblique fissure  The left-sided the effusion demonstrates convex anterior margin and the fissural fluid demonstrates HEART/GREAT VESSELS:  Fluid which was noted on the previous study around the ascending aorta is resolved  Pericardial effusion has resolved  MEDIASTINUM AND CHON:  Lower right paratracheal lymph nodes are seen, measuring 11 mm  Subcarinal lymph nodes are seen which are calcified  Calcified right hilar lymph nodes are seen CHEST WALL AND LOWER NECK:   Again noted is diastases of the sternum body with the high since  A retrosternal fluid collection was noted abutting the pericardium at the level of the mid sternal body  This is markedly decreased  There is some residual soft tissue thickening noted in this area measuring about 2 8 x 3 cm  On the previous study this was measuring about 3 6 x 6 5 cm  This is located at the level of the right ventricular outflow tract A rounded density seen in the right anterior chest wall deep to the right pectoralis major, measuring 3 1 x 3 2 cm  Larger from the previous study on the previous study this was measuring 3 0 1 x 1 7 cm VISUALIZED STRUCTURES IN THE UPPER ABDOMEN:  Nodular contour of the liver suggests cirrhosis There is perihepatic fluid, there is perisplenic fluid Rounded hypodensity seen in the upper pole of the left kidney which has been evaluated on the previous study suggest cyst Nodularity noted within the left adrenal gland, stable OSSEOUS STRUCTURES:  Dehiscence of the sternum noted  Erosive changes in the sternum noted with well-circumscribed margin  Impression: Marked decrease in the previously noted  retrosternal collection which was extending into the mediastinum and abutting the pericardium    On the current study the residual area of soft tissue thickening, located at the level of the right ventricular outflow tract measures 2 8 x 3 cm  On the previous study this collection in this area was measuring 3 6 x 6 5 cm Resolution of the previously noted airspace consolidation in the both lung bases with small residual right effusion Moderate-sized left effusion which is seen tracking into the left oblique fissure, increased , this may be a complicated effusion Cirrhotic liver Mosaic attenuation lung parenchyma Well-circumscribed rounded hypodense area in the right anterior chest wall deep to the right pectoralis muscle and a adjacent to the multiple surgical clips, larger from the Prominent to the lower right paratracheal lymph nodes The study was marked in EPIC for immediate notification  Workstation performed: [id number]     Ir Permacath Removal    Result Date: 3/4/2020  Narrative: EXAMINATION: Right IJV tunneled HD catheter removal INDICATION: 74-year-old male with history of AFSHIN with return of kidney function presents for removal tunneled HD catheter  CONTRAST: N/A FLUOROSCOPY TIME:   N/A IMAGES:  None ANESTHESIA: Local lidocaine PROCEDURE:  The patient was identified verbally and by wristband  Timeout was performed  Existing informed consent was utilized  The patient was prepped and draped in the usual sterile fashion  All elements of maximal sterile barrier technique, cap and mask and sterile gloves and sterile drape and hand hygiene and 2% chlorhexidine for cutaneous antisepsis  The right IJV tunneled hemodialysis catheter was freed from the subcutaneous tissues using blunt dissection  Catheter was removed and hemostasis achieved  Sterile dressing was applied  The patient tolerated the procedure well without complication  The patient left the IR department in stable condition    Findings: Successful right IJV tunneled HD catheter removal      Impression: Impression: Successful tunneled HD catheter removal  Workstation performed: [id number]           ROS: As mentioned in HPI & Interval History otherwise 14 point ROS negative  Active Problems:   Patient Active Problem List   Diagnosis    Rotator cuff tear arthropathy, right    Secondary osteoarthritis of right shoulder    Aftercare following surgery of the musculoskeletal system    Benign essential hypertension    Abnormal TSH    Overweight (BMI 25 0-29  9)    Chronic midline back pain    Benign hypertension with CKD (chronic kidney disease) stage III (HCC)    Benign prostatic hyperplasia without lower urinary tract symptoms    Bradycardia    SOBOE (shortness of breath on exertion)    Dissection of thoracic aorta (HCC)    S/P aorta repair    Postoperative anemia due to acute blood loss    Anticoagulation goal of INR 2 to 3    AFSHIN (acute kidney injury) ([address] )    Hyperphosphatemia    Hypotension    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    Complication of vascular dialysis catheter    Pneumonia    Hyperlipidemia    Muscle spasm    Sleep disorder    Encounter for post surgical wound check    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       Past Medical History:   Past Medical History:   Diagnosis Date    Arthritis     BPH without urinary obstruction     CKD (chronic kidney disease), stage III (HCC)     baseline 1 6-1 7    GERD (gastroesophageal reflux disease)     Hyperlipidemia     Hypertension        Surgical History:   Past Surgical History:   Procedure Laterality Date    APPENDECTOMY      COLONOSCOPY  2017    FRACTURE SURGERY      IR [facility] EXCHANGE  1/6/2020    IR [facility] PLACEMENT  1/2/2020    IR [facility] REMOVAL  3/4/2020    IR TEMP HD CATH  12/23/2019    IR THORACENTESIS  12/24/2019    IR THORACENTESIS  12/27/2019    FL ASCEND AORTA GRAFT W ROOT REPLACMENT  VALVE CONDUIT/CORON RECONSTRUCT N/A 12/15/2019    Procedure: BENTALL PROCEDURE (ASCENDING AORTIC REPAIR) with 26mm Gelweave Graft;  Surgeon: Sandra Farias DO;  Location: [facility];  Service: Cardiac Surgery    AL RECONSTR TOTAL SHOULDER IMPLANT Right 2017    Procedure: ARTHROPLASTY SHOULDER REVERSE with OPEN CYST EXCISION;  Surgeon: Jessica Reyes MD;  Location: [facility];  Service: Orthopedics    SHOULDER SURGERY      WRIST SURGERY         Family History:    Family History   Problem Relation Age of Onset    No Known Problems Mother     Hypertension Father     Arthritis Family        Cancer-related family history is not on file      Social History:   Social History     Socioeconomic History    Marital status: /Civil Union     Spouse name: Not on file    Number of children: Not on file    Years of education: Not on file    Highest education level: Not on file   Occupational History    Not on file   Social Needs    Financial resource strain: Not on file    Food insecurity:     Worry: Not on file     Inability: Not on file    Transportation needs:     Medical: Not on file     Non-medical: Not on file   Tobacco Use    Smoking status: Former Smoker     Packs/day: 1 00     Last attempt to quit: 1980     Years since quittin 2    Smokeless tobacco: Never Used   Substance and Sexual Activity    Alcohol use: Not Currently    Drug use: Not Currently    Sexual activity: Not on file   Lifestyle    Physical activity:     Days per week: Not on file     Minutes per session: Not on file    Stress: Not on file   Relationships    Social connections:     Talks on phone: Not on file     Gets together: Not on file     Attends [employer] service: Not on file     Active member of club or organization: Not on file     Attends meetings of clubs or organizations: Not on file     Relationship status: Not on file    Intimate partner violence:     Fear of current or ex partner: Not on file     Emotionally abused: Not on file     Physically abused: Not on file     Forced sexual activity: Not on file   Other Topics Concern    Not on file   Social History Narrative    Daily caffeine consumption, 1 serving a day    Drinks coffee        Current Medications:   Current Outpatient Medications   Medication Sig Dispense Refill    acetaminophen (TYLENOL) 325 mg tablet Take 1-2 tabs q6h PRN mild fever/pain 90 tablet 0    amLODIPine (NORVASC) 5 mg tablet Take 1 tablet (5 mg total) by mouth daily 30 tablet 6    aspirin 81 mg chewable tablet Chew 1 tablet (81 mg total) daily 90 tablet 0    atorvastatin (LIPITOR) 40 mg tablet Take 1 tablet (40 mg total) by mouth daily 90 tablet 0    bisacodyl (DULCOLAX) 5 mg EC tablet Take 2 tablets (10 mg total) by mouth daily as needed for constipation 60 tablet 0    furosemide (LASIX) 40 mg tablet Take 80 mg by mouth daily       Melatonin 500 MCG TBDP Take 1 tablet (500 mcg total) by mouth daily as needed (insomnia)      metoprolol tartrate (LOPRESSOR) 25 mg tablet Take 1 tablet (25 mg total) by mouth every 12 (twelve) hours 180 tablet 0    omeprazole (PriLOSEC) 20 mg delayed release capsule take 1 capsule by mouth daily at bedtime 90 capsule 2    polyethylene glycol (MIRALAX) 17 g packet Take 17 g by mouth daily IF PACKETS UNAVAILABLE, MAY TAKE OTC EQUIVALENT  USED AS DIRECTED  510 g 0    tamsulosin (FLOMAX) 0 4 mg 0 4 mg daily with dinner       warfarin (COUMADIN) 2 5 mg tablet Take 2 5mg daily unless otherwise directed based on INR  90 tablet 0     No current facility-administered medications for this visit  Allergies: No Known Allergies      Physical Exam:    There is no height or weight on file to calculate BSA     Ht Readings from Last 3 Encounters:   03/05/20 5' 8" (1 727 m)   02/24/20 5' 9" (1 753 m)   02/11/20 5' 9" (1 753 m)       Wt Readings from Last 3 Encounters:   03/05/20 80 8 kg (178 lb 3 2 oz)   02/24/20 80 3 kg (177 lb)   02/11/20 78 5 kg (173 lb)        Temp Readings from Last 3 Encounters:   02/24/20 (!) 96 8 °F (36 °C) (Oral)   02/11/20 (!) 96 3 °F (35 7 °C) (Oral)   01/24/20 97 5 °F (36 4 °C) (Tympanic)        BP Readings from Last 3 Encounters:   03/05/20 126/82   02/24/20 106/62   02/11/20 112/62         Pulse Readings from Last 3 Encounters:   03/05/20 68   02/24/20 70   02/11/20 81         Physical Exam    Physical Exam   Constitutional: He is oriented to person, place, and time  He appears well-developed and well-nourished  No distress  HENT:   Head: Normocephalic and atraumatic  Eyes: Conjunctivae are normal    Neck: Normal range of motion  Neck supple  No tracheal deviation present  Cardiovascular: Normal rate and regular rhythm  Exam reveals no gallop and no friction rub  No murmur heard  Pulmonary/Chest: Effort normal and breath sounds normal  No respiratory distress  He has no wheezes  He has no rales  He exhibits no tenderness  Crackles left lung base  Abdominal: Soft  He exhibits no distension  There is no tenderness  Lymphadenopathy:     He has no cervical adenopathy  Neurological: He is alert and oriented to person, place, and time  Skin: Skin is warm and dry  He is not diaphoretic  No erythema  No pallor  Psychiatric: He has a normal mood and affect  His behavior is normal  Judgment and thought content normal    Vitals reviewed            Emergency Contacts:    Aleyda Winston, ,

## 2020-03-11 ENCOUNTER — OFFICE VISIT (OUTPATIENT)
Dept: HEMATOLOGY ONCOLOGY | Facility: CLINIC | Age: 80
End: 2020-03-11
Payer: COMMERCIAL

## 2020-03-11 VITALS
DIASTOLIC BLOOD PRESSURE: 72 MMHG | OXYGEN SATURATION: 91 % | HEART RATE: 66 BPM | BODY MASS INDEX: 27.25 KG/M2 | TEMPERATURE: 98.2 F | RESPIRATION RATE: 16 BRPM | WEIGHT: 179.8 LBS | SYSTOLIC BLOOD PRESSURE: 116 MMHG | HEIGHT: 68 IN

## 2020-03-11 DIAGNOSIS — D47.2 MONOCLONAL GAMMOPATHY: Primary | ICD-10-CM

## 2020-03-11 DIAGNOSIS — D62 POSTOPERATIVE ANEMIA DUE TO ACUTE BLOOD LOSS: ICD-10-CM

## 2020-03-11 PROCEDURE — 99203 OFFICE O/P NEW LOW 30 MIN: CPT | Performed by: PHYSICIAN ASSISTANT

## 2020-03-18 ENCOUNTER — TELEPHONE (OUTPATIENT)
Dept: OTHER | Facility: OTHER | Age: 80
End: 2020-03-18

## 2020-03-18 ENCOUNTER — TELEPHONE (OUTPATIENT)
Dept: CARDIOLOGY CLINIC | Facility: CLINIC | Age: 80
End: 2020-03-18

## 2020-03-18 ENCOUNTER — TRANSCRIBE ORDERS (OUTPATIENT)
Dept: RADIOLOGY | Facility: HOSPITAL | Age: 80
End: 2020-03-18

## 2020-03-18 ENCOUNTER — HOSPITAL ENCOUNTER (OUTPATIENT)
Dept: RADIOLOGY | Facility: HOSPITAL | Age: 80
Discharge: HOME | End: 2020-03-18
Attending: INTERNAL MEDICINE
Payer: COMMERCIAL

## 2020-03-18 ENCOUNTER — OFFICE VISIT (OUTPATIENT)
Dept: CARDIOLOGY CLINIC | Facility: CLINIC | Age: 80
End: 2020-03-18
Payer: COMMERCIAL

## 2020-03-18 VITALS
SYSTOLIC BLOOD PRESSURE: 124 MMHG | OXYGEN SATURATION: 98 % | DIASTOLIC BLOOD PRESSURE: 64 MMHG | WEIGHT: 183 LBS | BODY MASS INDEX: 27.74 KG/M2 | HEART RATE: 79 BPM | HEIGHT: 68 IN

## 2020-03-18 DIAGNOSIS — I12.9 BENIGN HYPERTENSION WITH CKD (CHRONIC KIDNEY DISEASE) STAGE III (HCC): Chronic | ICD-10-CM

## 2020-03-18 DIAGNOSIS — I71.01 DISSECTION OF THORACIC AORTA (HCC): Primary | ICD-10-CM

## 2020-03-18 DIAGNOSIS — N17.9 AKI (ACUTE KIDNEY INJURY) (HCC): ICD-10-CM

## 2020-03-18 DIAGNOSIS — N18.30 BENIGN HYPERTENSION WITH CKD (CHRONIC KIDNEY DISEASE) STAGE III (HCC): Chronic | ICD-10-CM

## 2020-03-18 DIAGNOSIS — N17.9 AKI (ACUTE KIDNEY INJURY) (HCC): Primary | ICD-10-CM

## 2020-03-18 DIAGNOSIS — R06.02 SOBOE (SHORTNESS OF BREATH ON EXERTION): ICD-10-CM

## 2020-03-18 DIAGNOSIS — J90 PLEURAL EFFUSION: ICD-10-CM

## 2020-03-18 DIAGNOSIS — Z98.890 S/P AORTA REPAIR: ICD-10-CM

## 2020-03-18 PROCEDURE — 71046 X-RAY EXAM CHEST 2 VIEWS: CPT

## 2020-03-18 PROCEDURE — 1036F TOBACCO NON-USER: CPT | Performed by: INTERNAL MEDICINE

## 2020-03-18 PROCEDURE — 3008F BODY MASS INDEX DOCD: CPT | Performed by: INTERNAL MEDICINE

## 2020-03-18 PROCEDURE — 99214 OFFICE O/P EST MOD 30 MIN: CPT | Performed by: INTERNAL MEDICINE

## 2020-03-18 PROCEDURE — 1160F RVW MEDS BY RX/DR IN RCRD: CPT | Performed by: INTERNAL MEDICINE

## 2020-03-18 PROCEDURE — 3074F SYST BP LT 130 MM HG: CPT | Performed by: INTERNAL MEDICINE

## 2020-03-18 PROCEDURE — 4040F PNEUMOC VAC/ADMIN/RCVD: CPT | Performed by: INTERNAL MEDICINE

## 2020-03-18 PROCEDURE — 3078F DIAST BP <80 MM HG: CPT | Performed by: INTERNAL MEDICINE

## 2020-03-18 NOTE — PROGRESS NOTES
Cardiology Follow Up    [facility]  1940  [address] [facility] [address] [address]  [phone]    1  Dissection of thoracic aorta ([facility] )     2  S/P aorta repair     3  SOBOE (shortness of breath on exertion)  X-ray chest 2 views   4  AFSHIN (acute kidney injury) ([facility] )     5  Benign hypertension with CKD (chronic kidney disease) stage III ([facility] )     6  Pleural effusion  X-ray chest 2 views       Discussion/Summary:    Shortness of breath: diastolic CHF  Now off HD, which was probably helping to control volume status better  Decreased lung sounds, previously with pleural effusions noted  Check CXR today, if pleural effusions persist, will talk to Dr Jarrett Perez  Attempt to increase diuresis, additional 40mg in afternoon of lasix  Check BMP 1-2 weeks  Continue to monitor weight  PAF: in sinus today  Holter monitor with no afib  Was pretty prolonged in hospital, will keep on anticoagulation  Type A dissection - s/p repair  Some sternal nonunion, following with surgeon as well  CKD: Off dialysis now  See above with increase of diuretics  Previous History:  51-year-old man  He had a type a dissection in December, 2019  Postoperatively, acute kidney injury on chronic kidney disease requiring dialysis  Last HD now 2/20/2020  Postoperatively with paroxysmal atrial fibrillation  He had QT prolongation with amiodarone  He was in and out of atrial fibrillation, but was asymptomatic when he had it  He has been on warfarin for anticoagulation  He also had left pleural effusions which required 2 thoracenteses during hospitalization, seen on CT scan done for surgeon as well  Interval history:  Since last visit, holter monitor done for palpitations showed no afib  He is getting some strength back, but still reports shortness of breath with exertion, and orthopnea    He did have pleural effusion seen on CT scan  His weight is up about 10 lbs since last visit with me as well  Has been on lasix, reports urinating ok  Not needed HD since Feb       Problem List     Rotator cuff tear arthropathy, right (Chronic)    Secondary osteoarthritis of right shoulder    Aftercare following surgery of the musculoskeletal system    Benign essential hypertension (Chronic)    Abnormal TSH    Overweight (BMI 25 0-29  9)    Chronic midline back pain    Benign hypertension with CKD (chronic kidney disease) stage III ([facility]) (Chronic)    Benign prostatic hyperplasia without lower urinary tract symptoms    Bradycardia    SOBOE (shortness of breath on exertion)    Dissection of thoracic aorta ([facility])    S/P aorta repair    Postoperative anemia due to acute blood loss    Anticoagulation goal of INR 2 to 3    AFSHIN (acute kidney injury) ([address] )    Hyperphosphatemia    Hypotension    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    Complication of vascular dialysis catheter    Pneumonia    Hyperlipidemia    Muscle spasm    Sleep disorder        Past Medical History:   Diagnosis Date    Arthritis     BPH without urinary obstruction     CKD (chronic kidney disease), stage III ([facility])     baseline 1 6-1 7    GERD (gastroesophageal reflux disease)     Hyperlipidemia     Hypertension      Social History     Tobacco Use    Smoking status: Former Smoker     Packs/day: 1 00     Last attempt to quit: 1980     Years since quittin 2    Smokeless tobacco: Never Used   Substance Use Topics    Alcohol use: Not Currently    Drug use: Not Currently      Family History   Problem Relation Age of Onset    No Known Problems Mother     Hypertension Father     Arthritis Family      Past Surgical History:   Procedure Laterality Date    APPENDECTOMY      COLONOSCOPY  2017    FRACTURE SURGERY      [facility] EXCH[facility]  2020    [facility] PLACEMENT  2020    [facility] REMOVAL  3/4/2020    IR TEMP HD CATH  12/23/2019    IR THORACENTESIS  12/24/2019    IR THORACENTESIS  12/27/2019    AL ASCEND AORTA GRAFT W ROOT REPLACMENT  VALVE CONDUIT/CORON RECONSTRUCT N/A 12/15/2019    Procedure: BENTALL PROCEDURE (ASCENDING AORTIC REPAIR) with 26mm Gelweave Graft;  Surgeon: Sally Perez DO;  [facility];  Service: Cardiac Surgery    AL RECONSTR TOTAL SHOULDER IMPLANT Right 12/5/2017    Procedure: ARTHROPLASTY SHOULDER REVERSE with OPEN CYST EXCISION;  Surgeon: Meenu Christine MD;  [facility];  Service: Orthopedics    SHOULDER SURGERY      WRIST SURGERY         Current Outpatient Medications:     acetaminophen (TYLENOL) 325 mg tablet, Take 1-2 tabs q6h PRN mild fever/pain, Disp: 90 tablet, Rfl: 0    amLODIPine (NORVASC) 5 mg tablet, Take 1 tablet (5 mg total) by mouth daily, Disp: 30 tablet, Rfl: 6    aspirin 81 mg chewable tablet, Chew 1 tablet (81 mg total) daily, Disp: 90 tablet, Rfl: 0    atorvastatin (LIPITOR) 40 mg tablet, Take 1 tablet (40 mg total) by mouth daily, Disp: 90 tablet, Rfl: 0    furosemide (LASIX) 40 mg tablet, Take 2 tablets (80 mg total) by mouth daily, Disp: 90 tablet, Rfl: 3    Melatonin 500 MCG TBDP, Take 1 tablet (500 mcg total) by mouth daily as needed (insomnia), Disp: , Rfl:     metoprolol tartrate (LOPRESSOR) 25 mg tablet, Take 1 tablet (25 mg total) by mouth every 12 (twelve) hours, Disp: 180 tablet, Rfl: 0    omeprazole (PriLOSEC) 20 mg delayed release capsule, take 1 capsule by mouth daily at bedtime, Disp: 90 capsule, Rfl: 2    polyethylene glycol (MIRALAX) 17 g packet, Take 17 g by mouth daily IF PACKETS UNAVAILABLE, MAY TAKE OTC EQUIVALENT   USED AS DIRECTED , Disp: 510 g, Rfl: 0    tamsulosin (FLOMAX) 0 4 mg, 0 4 mg daily with dinner , Disp: , Rfl:     warfarin (COUMADIN) 2 5 mg tablet, Take 2 5mg daily unless otherwise directed based on INR , Disp: 90 tablet, Rfl: 0    bisacodyl (DULCOLAX) 5 mg EC tablet, Take 2 tablets (10 mg total) by mouth daily as needed for constipation, Disp: 60 tablet, Rfl: 0  No Known Allergies    Vitals:    03/18/20 1009   BP: 124/64   BP Location: Right arm   Patient Position: Sitting   Cuff Size: Standard   Pulse: 79   SpO2: 98%   Weight: 83 kg (183 lb)   Height: 5' 8" (1 727 m)     Vitals:    03/18/20 1009   Weight: 83 kg (183 lb)      Height: 5' 8" (172 7 cm)   Body mass index is 27 83 kg/m²  Physical Exam:  GEN: Oliver Sharpe appears well, alert and oriented x 3, pleasant and cooperative   HEENT: pupils equal, round, and reactive to light; extraocular muscles intact  NECK: supple, no carotid bruits   HEART: regular rhythm, normal S1 and S2, no murmurs, clicks, gallops or rubs   LUNGS: Decreased sounds bilaterally, worse L than R     ABDOMEN: normal bowel sounds, soft, no tenderness, no distention  EXTREMITIES: Trace LE edema  NEURO: no focal findings   SKIN: normal without suspicious lesions on exposed skin    ROS:  Positive for fatigue, palpitations, shortness of breath, cough/phlegm, labile blood pressure  Except as noted in HPI, is otherwise reviewed in detail and a 12 point review of systems is negative    ROS reviewed and is unchanged    Labs:  Lab Results   Component Value Date    K 3 8 03/04/2020     03/04/2020    CREATININE 2 21 (H) 03/04/2020    BUN 52 (H) 03/04/2020    CO2 28 03/04/2020    ALT 21 12/29/2019    AST 32 12/29/2019    INR 2 80 (A) 03/05/2020    GLUF 95 03/04/2020    HGBA1C 5 3 11/17/2017    WBC 8 99 03/04/2020    HGB 9 1 (L) 03/04/2020    HCT 30 6 (L) 03/04/2020     03/04/2020       No results found for: CHOL  Lab Results   Component Value Date    LDLCALC 115 (H) 03/08/2018    LDLCALC 116 (H) 04/25/2017    LDLCALC 131 (H) 01/09/2016     Lab Results   Component Value Date    HDL 43 03/08/2018    HDL 32 (L) 04/25/2017    HDL 38 (L) 01/09/2016     Lab Results   Component Value Date    TRIG 82 03/08/2018    TRIG 142 04/25/2017    TRIG 124 01/09/2016       Testing:  Echo:  LEFT VENTRICLE:  Systolic Function: normal  Ejection Fraction: 65%  Cavity size: normal    RIGHT VENTRICLE:  Systolic Function: normal   Cavity size moderately dilated  Hypertrophy (severe LVH) present  AORTIC VALVE:  Leaflets: normal and trileaflet  Leaflet motions normal and normal  Stenosis: none  Regurgitation: trace  MITRAL VALVE:  Leaflets: calcified and normal  Leaflet Motions: normal  Regurgitation: none  Stenosis: none  TRICUSPID VALVE:  Leaflets: normal, annulus severely dilated and dilate annulus  Leaflet Motions: restricted  Stenosis: none  Regurgitation: moderate  PULMONIC VALVE:  Leaflets: normal  Regurgitation: none  Stenosis: none  ASCENDING AORTA:  Size:  normal  Dissection present  AORTIC ARCH:  Size:  normal    OTHER AORTIC FINDINGS:   Hematoma noted that extends from distal arch to mid descending aorta, 0 7 cm in thickness  RIGHT ATRIUM:  Size:  dilated  No spontaneous echo contrast   LEFT ATRIUM:  Size: normal    LEFT ATRIAL APPENDAGE:  Size: normal    ATRIAL SEPTUM:  Intra-atrial septal morphology: normal    VENTRICULAR SEPTUM:  Intra-ventricular septum morphology: normal    OTHER FINDINGS:  Pericardium:  normal  Pleural Effusion:  none  POSTPROCEDURE  LEFT VENTRICLE: Unchanged   RIGHT VENTRICLE:   Systolic Function: mildly depressed  AORTIC VALVE:   Leaflets: native  Stenosis: none  Regurgitation: mild  MITRAL VALVE: Unchanged   TRICUSPID VALVE:   Leaflets: native  Regurgitation: systolic flow reversal in hepatic vein and severe  PULMONIC VALVE: Unchanged   ATRIA: Unchanged   AORTA:   OTHER AORTA FINDINGS: The proximal aorta has been replaced  The mural hematoma on the descending aorta is preserved  Velna Sausal REMOVAL:  Probe Removal: atraumatic  ECHOCARDIOGRAM COMMENTS:  Noted is the absence of right coronary ostia  The left coronary ostia appears to have two vessels arising from it  Confirmed surgically    EKG:  Sinus rhythm, 79 beats per minute  Nonspecific T wave inversion

## 2020-03-18 NOTE — TELEPHONE ENCOUNTER
Spoke with patient's daughter Renata Metcalf regarding chest Xray results  Advised for patient to continue increase in lasix dose and check BMP next week per Dr Terra Head  Left message for patient to call the office back  ----- Message from Mohinder Nguyễn MD sent at 3/18/2020 11:37 AM EDT -----  Please let patient know CXR showed only small pleural effusions, won't be enough to drain with a needle  Continue with the increase in lasix dose, check blood work (BMP) next week (ordered)

## 2020-04-01 DIAGNOSIS — I71.01 DISSECTION OF THORACIC AORTA (HCC): ICD-10-CM

## 2020-04-01 DIAGNOSIS — N17.9 ACUTE KIDNEY INJURY SUPERIMPOSED ON CHRONIC KIDNEY DISEASE (HCC): ICD-10-CM

## 2020-04-01 DIAGNOSIS — Z98.890 S/P AORTA REPAIR: ICD-10-CM

## 2020-04-01 DIAGNOSIS — N18.9 ACUTE KIDNEY INJURY SUPERIMPOSED ON CHRONIC KIDNEY DISEASE (HCC): ICD-10-CM

## 2020-04-01 DIAGNOSIS — N17.9 AKI (ACUTE KIDNEY INJURY) (HCC): ICD-10-CM

## 2020-04-01 RX ORDER — WARFARIN SODIUM 2.5 MG/1
TABLET ORAL
Qty: 90 TABLET | Refills: 1 | Status: SHIPPED | OUTPATIENT
Start: 2020-04-01 | End: 2020-09-18 | Stop reason: SDUPTHER

## 2020-04-03 ENCOUNTER — ANTICOAG VISIT (OUTPATIENT)
Dept: CARDIOLOGY CLINIC | Facility: CLINIC | Age: 80
End: 2020-04-03

## 2020-04-03 ENCOUNTER — APPOINTMENT (OUTPATIENT)
Dept: LAB | Facility: HOSPITAL | Age: 80
End: 2020-04-03
Attending: INTERNAL MEDICINE
Payer: COMMERCIAL

## 2020-04-03 DIAGNOSIS — D62 POSTOPERATIVE ANEMIA DUE TO ACUTE BLOOD LOSS: ICD-10-CM

## 2020-04-03 DIAGNOSIS — Z98.890 S/P AORTA REPAIR: ICD-10-CM

## 2020-04-03 DIAGNOSIS — N17.9 ACUTE KIDNEY INJURY SUPERIMPOSED ON CHRONIC KIDNEY DISEASE (HCC): ICD-10-CM

## 2020-04-03 DIAGNOSIS — N17.9 AKI (ACUTE KIDNEY INJURY) (HCC): ICD-10-CM

## 2020-04-03 DIAGNOSIS — Z51.81 ANTICOAGULATION GOAL OF INR 2 TO 3: ICD-10-CM

## 2020-04-03 DIAGNOSIS — Z79.01 ANTICOAGULATION GOAL OF INR 2 TO 3: ICD-10-CM

## 2020-04-03 DIAGNOSIS — N18.9 ACUTE KIDNEY INJURY SUPERIMPOSED ON CHRONIC KIDNEY DISEASE (HCC): ICD-10-CM

## 2020-04-03 DIAGNOSIS — I71.01 DISSECTION OF THORACIC AORTA (HCC): ICD-10-CM

## 2020-04-03 DIAGNOSIS — D47.2 MONOCLONAL GAMMOPATHY: ICD-10-CM

## 2020-04-03 LAB
ALBUMIN SERPL BCP-MCNC: 2.6 G/DL (ref 3.5–5)
ALP SERPL-CCNC: 219 U/L (ref 46–116)
ALT SERPL W P-5'-P-CCNC: 22 U/L (ref 12–78)
ANION GAP SERPL CALCULATED.3IONS-SCNC: 4 MMOL/L (ref 4–13)
AST SERPL W P-5'-P-CCNC: 39 U/L (ref 5–45)
BILIRUB SERPL-MCNC: 0.58 MG/DL (ref 0.2–1)
BUN SERPL-MCNC: 28 MG/DL (ref 5–25)
CALCIUM SERPL-MCNC: 8.8 MG/DL (ref 8.3–10.1)
CHLORIDE SERPL-SCNC: 111 MMOL/L (ref 100–108)
CO2 SERPL-SCNC: 30 MMOL/L (ref 21–32)
CREAT SERPL-MCNC: 2.05 MG/DL (ref 0.6–1.3)
ERYTHROCYTE [DISTWIDTH] IN BLOOD BY AUTOMATED COUNT: 16.1 % (ref 11.6–15.1)
GFR SERPL CREATININE-BSD FRML MDRD: 30 ML/MIN/1.73SQ M
GLUCOSE P FAST SERPL-MCNC: 100 MG/DL (ref 65–99)
HCT VFR BLD AUTO: 30.8 % (ref 36.5–49.3)
HGB BLD-MCNC: 9.4 G/DL (ref 12–17)
IGA SERPL-MCNC: 533 MG/DL (ref 70–400)
IGG SERPL-MCNC: 2870 MG/DL (ref 700–1600)
IGM SERPL-MCNC: 180 MG/DL (ref 40–230)
MCH RBC QN AUTO: 26.8 PG (ref 26.8–34.3)
MCHC RBC AUTO-ENTMCNC: 30.5 G/DL (ref 31.4–37.4)
MCV RBC AUTO: 88 FL (ref 82–98)
PLATELET # BLD AUTO: 310 THOUSANDS/UL (ref 149–390)
PMV BLD AUTO: 10.1 FL (ref 8.9–12.7)
POTASSIUM SERPL-SCNC: 4.4 MMOL/L (ref 3.5–5.3)
PROT SERPL-MCNC: 8.4 G/DL (ref 6.4–8.2)
RBC # BLD AUTO: 3.51 MILLION/UL (ref 3.88–5.62)
SODIUM SERPL-SCNC: 145 MMOL/L (ref 136–145)
WBC # BLD AUTO: 7.18 THOUSAND/UL (ref 4.31–10.16)

## 2020-04-03 PROCEDURE — 82784 ASSAY IGA/IGD/IGG/IGM EACH: CPT

## 2020-04-03 PROCEDURE — 80053 COMPREHEN METABOLIC PANEL: CPT

## 2020-04-03 PROCEDURE — 83883 ASSAY NEPHELOMETRY NOT SPEC: CPT

## 2020-04-03 PROCEDURE — 85027 COMPLETE CBC AUTOMATED: CPT

## 2020-04-03 RX ORDER — WARFARIN SODIUM 2.5 MG/1
TABLET ORAL
Qty: 90 TABLET | Refills: 1 | OUTPATIENT
Start: 2020-04-03

## 2020-04-04 LAB
KAPPA LC FREE SER-MCNC: 182.8 MG/L (ref 3.3–19.4)
KAPPA LC FREE/LAMBDA FREE SER: 1.42 {RATIO} (ref 0.26–1.65)
LAMBDA LC FREE SERPL-MCNC: 128.6 MG/L (ref 5.7–26.3)

## 2020-04-17 ENCOUNTER — TELEMEDICINE (OUTPATIENT)
Dept: CARDIOLOGY CLINIC | Facility: CLINIC | Age: 80
End: 2020-04-17
Payer: COMMERCIAL

## 2020-04-17 VITALS
BODY MASS INDEX: 26.07 KG/M2 | SYSTOLIC BLOOD PRESSURE: 111 MMHG | HEART RATE: 81 BPM | WEIGHT: 172 LBS | HEIGHT: 68 IN | DIASTOLIC BLOOD PRESSURE: 72 MMHG

## 2020-04-17 DIAGNOSIS — I12.9 BENIGN HYPERTENSION WITH CKD (CHRONIC KIDNEY DISEASE) STAGE III (HCC): Chronic | ICD-10-CM

## 2020-04-17 DIAGNOSIS — I48.0 PAROXYSMAL ATRIAL FIBRILLATION (HCC): ICD-10-CM

## 2020-04-17 DIAGNOSIS — N18.30 BENIGN HYPERTENSION WITH CKD (CHRONIC KIDNEY DISEASE) STAGE III (HCC): Chronic | ICD-10-CM

## 2020-04-17 DIAGNOSIS — I10 BENIGN ESSENTIAL HYPERTENSION: Chronic | ICD-10-CM

## 2020-04-17 DIAGNOSIS — I71.01 DISSECTION OF THORACIC AORTA (HCC): Primary | ICD-10-CM

## 2020-04-17 PROCEDURE — 99214 OFFICE O/P EST MOD 30 MIN: CPT | Performed by: INTERNAL MEDICINE

## 2020-04-17 RX ORDER — FUROSEMIDE 80 MG
80 TABLET ORAL DAILY
COMMUNITY
Start: 2020-04-06 | End: 2020-05-05 | Stop reason: DRUGHIGH

## 2020-05-05 ENCOUNTER — TELEMEDICINE (OUTPATIENT)
Dept: NEPHROLOGY | Facility: CLINIC | Age: 80
End: 2020-05-05
Payer: COMMERCIAL

## 2020-05-05 DIAGNOSIS — Z98.890 S/P AORTA REPAIR: ICD-10-CM

## 2020-05-05 DIAGNOSIS — I10 BENIGN ESSENTIAL HYPERTENSION: Chronic | ICD-10-CM

## 2020-05-05 DIAGNOSIS — I71.01 DISSECTION OF THORACIC AORTA (HCC): ICD-10-CM

## 2020-05-05 DIAGNOSIS — E78.5 HYPERLIPIDEMIA, UNSPECIFIED HYPERLIPIDEMIA TYPE: ICD-10-CM

## 2020-05-05 DIAGNOSIS — N18.30 BENIGN HYPERTENSION WITH CKD (CHRONIC KIDNEY DISEASE) STAGE III (HCC): Primary | Chronic | ICD-10-CM

## 2020-05-05 DIAGNOSIS — I12.9 BENIGN HYPERTENSION WITH CKD (CHRONIC KIDNEY DISEASE) STAGE III (HCC): Primary | Chronic | ICD-10-CM

## 2020-05-05 DIAGNOSIS — R60.0 LOCALIZED EDEMA: ICD-10-CM

## 2020-05-05 PROCEDURE — 99214 OFFICE O/P EST MOD 30 MIN: CPT | Performed by: INTERNAL MEDICINE

## 2020-05-05 RX ORDER — FUROSEMIDE 80 MG
80 TABLET ORAL 2 TIMES DAILY
Qty: 60 TABLET | Refills: 5 | Status: SHIPPED | OUTPATIENT
Start: 2020-05-05 | End: 2020-05-10 | Stop reason: HOSPADM

## 2020-05-05 RX ORDER — FERROUS SULFATE 325(65) MG
325 TABLET ORAL
COMMUNITY
End: 2020-06-24

## 2020-05-08 ENCOUNTER — OFFICE VISIT (OUTPATIENT)
Dept: HEMATOLOGY ONCOLOGY | Facility: CLINIC | Age: 80
End: 2020-05-08
Payer: COMMERCIAL

## 2020-05-08 ENCOUNTER — APPOINTMENT (EMERGENCY)
Dept: RADIOLOGY | Facility: HOSPITAL | Age: 80
DRG: 291 | End: 2020-05-08
Payer: COMMERCIAL

## 2020-05-08 ENCOUNTER — HOSPITAL ENCOUNTER (INPATIENT)
Facility: HOSPITAL | Age: 80
LOS: 1 days | Discharge: HOME | DRG: 291 | End: 2020-05-10
Attending: EMERGENCY MEDICINE | Admitting: FAMILY MEDICINE
Payer: COMMERCIAL

## 2020-05-08 VITALS
HEART RATE: 81 BPM | TEMPERATURE: 97.4 F | RESPIRATION RATE: 16 BRPM | BODY MASS INDEX: 28.49 KG/M2 | WEIGHT: 188 LBS | HEIGHT: 68 IN | SYSTOLIC BLOOD PRESSURE: 102 MMHG | DIASTOLIC BLOOD PRESSURE: 68 MMHG

## 2020-05-08 DIAGNOSIS — D47.2 MGUS (MONOCLONAL GAMMOPATHY OF UNKNOWN SIGNIFICANCE): ICD-10-CM

## 2020-05-08 DIAGNOSIS — D64.9 CHRONIC ANEMIA: ICD-10-CM

## 2020-05-08 DIAGNOSIS — R06.02 SOBOE (SHORTNESS OF BREATH ON EXERTION): ICD-10-CM

## 2020-05-08 DIAGNOSIS — R06.00 DOE (DYSPNEA ON EXERTION): Primary | ICD-10-CM

## 2020-05-08 DIAGNOSIS — D47.2 MONOCLONAL GAMMOPATHY: ICD-10-CM

## 2020-05-08 DIAGNOSIS — R93.89 ABNORMAL CHEST X-RAY: ICD-10-CM

## 2020-05-08 DIAGNOSIS — R94.31 T WAVE INVERSION ON ELECTROCARDIOGRAM: ICD-10-CM

## 2020-05-08 DIAGNOSIS — R06.00 DYSPNEA ON EXERTION: Primary | ICD-10-CM

## 2020-05-08 PROBLEM — R05.9 COUGH IN ADULT: Status: ACTIVE | Noted: 2020-05-08

## 2020-05-08 PROBLEM — R06.09 DOE (DYSPNEA ON EXERTION): Status: ACTIVE | Noted: 2020-05-08

## 2020-05-08 LAB
ALBUMIN SERPL BCP-MCNC: 2.9 G/DL (ref 3.5–5)
ALP SERPL-CCNC: 172 U/L (ref 46–116)
ALT SERPL W P-5'-P-CCNC: 19 U/L (ref 12–78)
ANION GAP SERPL CALCULATED.3IONS-SCNC: 6 MMOL/L (ref 4–13)
APTT PPP: 54 SECONDS (ref 23–37)
AST SERPL W P-5'-P-CCNC: 43 U/L (ref 5–45)
ATRIAL RATE: 80 BPM
BASOPHILS # BLD AUTO: 0.03 THOUSANDS/ΜL (ref 0–0.1)
BASOPHILS NFR BLD AUTO: 0 % (ref 0–1)
BILIRUB SERPL-MCNC: 0.72 MG/DL (ref 0.2–1)
BUN SERPL-MCNC: 39 MG/DL (ref 5–25)
CALCIUM SERPL-MCNC: 9 MG/DL (ref 8.3–10.1)
CHLORIDE SERPL-SCNC: 108 MMOL/L (ref 100–108)
CHOLEST SERPL-MCNC: 108 MG/DL (ref 50–200)
CO2 SERPL-SCNC: 28 MMOL/L (ref 21–32)
CREAT SERPL-MCNC: 2.64 MG/DL (ref 0.6–1.3)
EOSINOPHIL # BLD AUTO: 0.15 THOUSAND/ΜL (ref 0–0.61)
EOSINOPHIL NFR BLD AUTO: 2 % (ref 0–6)
ERYTHROCYTE [DISTWIDTH] IN BLOOD BY AUTOMATED COUNT: 19.6 % (ref 11.6–15.1)
GFR SERPL CREATININE-BSD FRML MDRD: 22 ML/MIN/1.73SQ M
GLUCOSE SERPL-MCNC: 85 MG/DL (ref 65–140)
HCT VFR BLD AUTO: 31.2 % (ref 36.5–49.3)
HDLC SERPL-MCNC: 32 MG/DL
HGB BLD-MCNC: 9.7 G/DL (ref 12–17)
IMM GRANULOCYTES # BLD AUTO: 0.02 THOUSAND/UL (ref 0–0.2)
IMM GRANULOCYTES NFR BLD AUTO: 0 % (ref 0–2)
INR PPP: 2.42 (ref 0.84–1.19)
LDLC SERPL CALC-MCNC: 59 MG/DL (ref 0–100)
LYMPHOCYTES # BLD AUTO: 1.46 THOUSANDS/ΜL (ref 0.6–4.47)
LYMPHOCYTES NFR BLD AUTO: 19 % (ref 14–44)
MCH RBC QN AUTO: 26.6 PG (ref 26.8–34.3)
MCHC RBC AUTO-ENTMCNC: 31.1 G/DL (ref 31.4–37.4)
MCV RBC AUTO: 86 FL (ref 82–98)
MONOCYTES # BLD AUTO: 1.23 THOUSAND/ΜL (ref 0.17–1.22)
MONOCYTES NFR BLD AUTO: 16 % (ref 4–12)
NEUTROPHILS # BLD AUTO: 4.98 THOUSANDS/ΜL (ref 1.85–7.62)
NEUTS SEG NFR BLD AUTO: 63 % (ref 43–75)
NRBC BLD AUTO-RTO: 0 /100 WBCS
NT-PROBNP SERPL-MCNC: 3859 PG/ML
P AXIS: 81 DEGREES
PLATELET # BLD AUTO: 238 THOUSANDS/UL (ref 149–390)
PMV BLD AUTO: 9.8 FL (ref 8.9–12.7)
POTASSIUM SERPL-SCNC: 4.1 MMOL/L (ref 3.5–5.3)
PR INTERVAL: 240 MS
PROT SERPL-MCNC: 8.6 G/DL (ref 6.4–8.2)
PROTHROMBIN TIME: 25.8 SECONDS (ref 11.6–14.5)
QRS AXIS: 68 DEGREES
QRSD INTERVAL: 76 MS
QT INTERVAL: 350 MS
QTC INTERVAL: 403 MS
RBC # BLD AUTO: 3.65 MILLION/UL (ref 3.88–5.62)
SARS-COV-2 RNA RESP QL NAA+PROBE: NEGATIVE
SODIUM SERPL-SCNC: 142 MMOL/L (ref 136–145)
T WAVE AXIS: 174 DEGREES
TRIGL SERPL-MCNC: 85 MG/DL
TROPONIN I SERPL-MCNC: 0.02 NG/ML
VENTRICULAR RATE: 80 BPM
WBC # BLD AUTO: 7.87 THOUSAND/UL (ref 4.31–10.16)

## 2020-05-08 PROCEDURE — 3074F SYST BP LT 130 MM HG: CPT | Performed by: PHYSICIAN ASSISTANT

## 2020-05-08 PROCEDURE — 1036F TOBACCO NON-USER: CPT | Performed by: PHYSICIAN ASSISTANT

## 2020-05-08 PROCEDURE — 1160F RVW MEDS BY RX/DR IN RCRD: CPT | Performed by: PHYSICIAN ASSISTANT

## 2020-05-08 PROCEDURE — 36415 COLL VENOUS BLD VENIPUNCTURE: CPT

## 2020-05-08 PROCEDURE — 99285 EMERGENCY DEPT VISIT HI MDM: CPT

## 2020-05-08 PROCEDURE — 3008F BODY MASS INDEX DOCD: CPT | Performed by: PHYSICIAN ASSISTANT

## 2020-05-08 PROCEDURE — 85025 COMPLETE CBC W/AUTO DIFF WBC: CPT | Performed by: EMERGENCY MEDICINE

## 2020-05-08 PROCEDURE — 99214 OFFICE O/P EST MOD 30 MIN: CPT | Performed by: PHYSICIAN ASSISTANT

## 2020-05-08 PROCEDURE — 84484 ASSAY OF TROPONIN QUANT: CPT | Performed by: EMERGENCY MEDICINE

## 2020-05-08 PROCEDURE — U0003 INFECTIOUS AGENT DETECTION BY NUCLEIC ACID (DNA OR RNA); SEVERE ACUTE RESPIRATORY SYNDROME CORONAVIRUS 2 (SARS-COV-2) (CORONAVIRUS DISEASE [COVID-19]), AMPLIFIED PROBE TECHNIQUE, MAKING USE OF HIGH THROUGHPUT TECHNOLOGIES AS DESCRIBED BY CMS-2020-01-R: HCPCS | Performed by: EMERGENCY MEDICINE

## 2020-05-08 PROCEDURE — 83880 ASSAY OF NATRIURETIC PEPTIDE: CPT | Performed by: EMERGENCY MEDICINE

## 2020-05-08 PROCEDURE — 93005 ELECTROCARDIOGRAM TRACING: CPT

## 2020-05-08 PROCEDURE — 80053 COMPREHEN METABOLIC PANEL: CPT | Performed by: EMERGENCY MEDICINE

## 2020-05-08 PROCEDURE — 4040F PNEUMOC VAC/ADMIN/RCVD: CPT | Performed by: PHYSICIAN ASSISTANT

## 2020-05-08 PROCEDURE — 85730 THROMBOPLASTIN TIME PARTIAL: CPT | Performed by: EMERGENCY MEDICINE

## 2020-05-08 PROCEDURE — 85610 PROTHROMBIN TIME: CPT | Performed by: EMERGENCY MEDICINE

## 2020-05-08 PROCEDURE — 93010 ELECTROCARDIOGRAM REPORT: CPT | Performed by: INTERNAL MEDICINE

## 2020-05-08 PROCEDURE — 99285 EMERGENCY DEPT VISIT HI MDM: CPT | Performed by: EMERGENCY MEDICINE

## 2020-05-08 PROCEDURE — 3078F DIAST BP <80 MM HG: CPT | Performed by: PHYSICIAN ASSISTANT

## 2020-05-08 PROCEDURE — 80061 LIPID PANEL: CPT | Performed by: STUDENT IN AN ORGANIZED HEALTH CARE EDUCATION/TRAINING PROGRAM

## 2020-05-08 PROCEDURE — 71045 X-RAY EXAM CHEST 1 VIEW: CPT

## 2020-05-08 RX ORDER — ACETAMINOPHEN 325 MG/1
650 TABLET ORAL EVERY 6 HOURS PRN
Status: DISCONTINUED | OUTPATIENT
Start: 2020-05-08 | End: 2020-05-10 | Stop reason: HOSPADM

## 2020-05-08 RX ORDER — WARFARIN SODIUM 2.5 MG/1
2.5 TABLET ORAL
Status: DISCONTINUED | OUTPATIENT
Start: 2020-05-08 | End: 2020-05-10 | Stop reason: HOSPADM

## 2020-05-08 RX ORDER — FUROSEMIDE 10 MG/ML
20 INJECTION INTRAMUSCULAR; INTRAVENOUS ONCE
Status: COMPLETED | OUTPATIENT
Start: 2020-05-08 | End: 2020-05-08

## 2020-05-08 RX ORDER — FERROUS SULFATE 325(65) MG
325 TABLET ORAL
Status: DISCONTINUED | OUTPATIENT
Start: 2020-05-09 | End: 2020-05-10 | Stop reason: HOSPADM

## 2020-05-08 RX ORDER — LANOLIN ALCOHOL/MO/W.PET/CERES
3 CREAM (GRAM) TOPICAL
Status: DISCONTINUED | OUTPATIENT
Start: 2020-05-08 | End: 2020-05-10 | Stop reason: HOSPADM

## 2020-05-08 RX ORDER — TAMSULOSIN HYDROCHLORIDE 0.4 MG/1
0.4 CAPSULE ORAL
Status: DISCONTINUED | OUTPATIENT
Start: 2020-05-09 | End: 2020-05-10 | Stop reason: HOSPADM

## 2020-05-08 RX ORDER — FUROSEMIDE 10 MG/ML
40 INJECTION INTRAMUSCULAR; INTRAVENOUS ONCE
Status: COMPLETED | OUTPATIENT
Start: 2020-05-08 | End: 2020-05-08

## 2020-05-08 RX ORDER — AMLODIPINE BESYLATE 5 MG/1
5 TABLET ORAL DAILY
Status: DISCONTINUED | OUTPATIENT
Start: 2020-05-09 | End: 2020-05-10 | Stop reason: HOSPADM

## 2020-05-08 RX ORDER — ATORVASTATIN CALCIUM 40 MG/1
40 TABLET, FILM COATED ORAL DAILY
Status: DISCONTINUED | OUTPATIENT
Start: 2020-05-09 | End: 2020-05-10 | Stop reason: HOSPADM

## 2020-05-08 RX ADMIN — WARFARIN SODIUM 2.5 MG: 2.5 TABLET ORAL at 20:49

## 2020-05-08 RX ADMIN — FUROSEMIDE 40 MG: 10 INJECTION, SOLUTION INTRAMUSCULAR; INTRAVENOUS at 20:49

## 2020-05-08 RX ADMIN — METOPROLOL TARTRATE 12.5 MG: 25 TABLET ORAL at 20:48

## 2020-05-08 RX ADMIN — FUROSEMIDE 20 MG: 10 INJECTION, SOLUTION INTRAMUSCULAR; INTRAVENOUS at 15:08

## 2020-05-08 RX ADMIN — MELATONIN 3 MG: at 21:14

## 2020-05-09 LAB
ANION GAP SERPL CALCULATED.3IONS-SCNC: 3 MMOL/L (ref 4–13)
BASOPHILS # BLD AUTO: 0.03 THOUSANDS/ΜL (ref 0–0.1)
BASOPHILS NFR BLD AUTO: 1 % (ref 0–1)
BUN SERPL-MCNC: 38 MG/DL (ref 5–25)
CALCIUM SERPL-MCNC: 8.6 MG/DL (ref 8.3–10.1)
CHLORIDE SERPL-SCNC: 111 MMOL/L (ref 100–108)
CO2 SERPL-SCNC: 29 MMOL/L (ref 21–32)
CREAT SERPL-MCNC: 2.44 MG/DL (ref 0.6–1.3)
EOSINOPHIL # BLD AUTO: 0.21 THOUSAND/ΜL (ref 0–0.61)
EOSINOPHIL NFR BLD AUTO: 3 % (ref 0–6)
ERYTHROCYTE [DISTWIDTH] IN BLOOD BY AUTOMATED COUNT: 19.9 % (ref 11.6–15.1)
GFR SERPL CREATININE-BSD FRML MDRD: 24 ML/MIN/1.73SQ M
GLUCOSE SERPL-MCNC: 69 MG/DL (ref 65–140)
HCT VFR BLD AUTO: 29 % (ref 36.5–49.3)
HGB BLD-MCNC: 9 G/DL (ref 12–17)
IMM GRANULOCYTES # BLD AUTO: 0.02 THOUSAND/UL (ref 0–0.2)
IMM GRANULOCYTES NFR BLD AUTO: 0 % (ref 0–2)
INR PPP: 2.48 (ref 0.84–1.19)
LYMPHOCYTES # BLD AUTO: 1.1 THOUSANDS/ΜL (ref 0.6–4.47)
LYMPHOCYTES NFR BLD AUTO: 18 % (ref 14–44)
MCH RBC QN AUTO: 26.9 PG (ref 26.8–34.3)
MCHC RBC AUTO-ENTMCNC: 31 G/DL (ref 31.4–37.4)
MCV RBC AUTO: 87 FL (ref 82–98)
MONOCYTES # BLD AUTO: 1.06 THOUSAND/ΜL (ref 0.17–1.22)
MONOCYTES NFR BLD AUTO: 17 % (ref 4–12)
NEUTROPHILS # BLD AUTO: 3.75 THOUSANDS/ΜL (ref 1.85–7.62)
NEUTS SEG NFR BLD AUTO: 61 % (ref 43–75)
NRBC BLD AUTO-RTO: 0 /100 WBCS
PLATELET # BLD AUTO: 222 THOUSANDS/UL (ref 149–390)
PMV BLD AUTO: 10.2 FL (ref 8.9–12.7)
POTASSIUM SERPL-SCNC: 4 MMOL/L (ref 3.5–5.3)
PROCALCITONIN SERPL-MCNC: 0.27 NG/ML
PROTHROMBIN TIME: 26.3 SECONDS (ref 11.6–14.5)
RBC # BLD AUTO: 3.35 MILLION/UL (ref 3.88–5.62)
SODIUM SERPL-SCNC: 143 MMOL/L (ref 136–145)
WBC # BLD AUTO: 6.17 THOUSAND/UL (ref 4.31–10.16)

## 2020-05-09 PROCEDURE — 84145 PROCALCITONIN (PCT): CPT | Performed by: FAMILY MEDICINE

## 2020-05-09 PROCEDURE — 85025 COMPLETE CBC W/AUTO DIFF WBC: CPT | Performed by: FAMILY MEDICINE

## 2020-05-09 PROCEDURE — 85610 PROTHROMBIN TIME: CPT | Performed by: STUDENT IN AN ORGANIZED HEALTH CARE EDUCATION/TRAINING PROGRAM

## 2020-05-09 PROCEDURE — NC001 PR NO CHARGE: Performed by: FAMILY MEDICINE

## 2020-05-09 PROCEDURE — 99204 OFFICE O/P NEW MOD 45 MIN: CPT | Performed by: FAMILY MEDICINE

## 2020-05-09 PROCEDURE — 80048 BASIC METABOLIC PNL TOTAL CA: CPT | Performed by: FAMILY MEDICINE

## 2020-05-09 PROCEDURE — 99221 1ST HOSP IP/OBS SF/LOW 40: CPT | Performed by: INTERNAL MEDICINE

## 2020-05-09 RX ORDER — FUROSEMIDE 10 MG/ML
40 INJECTION INTRAMUSCULAR; INTRAVENOUS DAILY
Status: DISCONTINUED | OUTPATIENT
Start: 2020-05-09 | End: 2020-05-10

## 2020-05-09 RX ADMIN — METOPROLOL TARTRATE 12.5 MG: 25 TABLET ORAL at 08:34

## 2020-05-09 RX ADMIN — ATORVASTATIN CALCIUM 40 MG: 40 TABLET, FILM COATED ORAL at 08:35

## 2020-05-09 RX ADMIN — TAMSULOSIN HYDROCHLORIDE 0.4 MG: 0.4 CAPSULE ORAL at 16:45

## 2020-05-09 RX ADMIN — METOPROLOL TARTRATE 12.5 MG: 25 TABLET ORAL at 21:04

## 2020-05-09 RX ADMIN — WARFARIN SODIUM 2.5 MG: 2.5 TABLET ORAL at 16:45

## 2020-05-09 RX ADMIN — FUROSEMIDE 40 MG: 10 INJECTION, SOLUTION INTRAMUSCULAR; INTRAVENOUS at 13:22

## 2020-05-09 RX ADMIN — FERROUS SULFATE TAB 325 MG (65 MG ELEMENTAL FE) 325 MG: 325 (65 FE) TAB at 08:34

## 2020-05-09 RX ADMIN — MELATONIN 3 MG: at 21:05

## 2020-05-09 RX ADMIN — AMLODIPINE BESYLATE 5 MG: 5 TABLET ORAL at 08:34

## 2020-05-10 ENCOUNTER — APPOINTMENT (INPATIENT)
Dept: NON INVASIVE DIAGNOSTICS | Facility: HOSPITAL | Age: 80
DRG: 291 | End: 2020-05-10
Payer: COMMERCIAL

## 2020-05-10 VITALS
HEART RATE: 86 BPM | OXYGEN SATURATION: 96 % | DIASTOLIC BLOOD PRESSURE: 73 MMHG | SYSTOLIC BLOOD PRESSURE: 121 MMHG | BODY MASS INDEX: 27.13 KG/M2 | RESPIRATION RATE: 18 BRPM | HEIGHT: 68 IN | WEIGHT: 179.01 LBS | TEMPERATURE: 97.8 F

## 2020-05-10 LAB
ANION GAP SERPL CALCULATED.3IONS-SCNC: 3 MMOL/L (ref 4–13)
BASOPHILS # BLD AUTO: 0.02 THOUSANDS/ΜL (ref 0–0.1)
BASOPHILS NFR BLD AUTO: 0 % (ref 0–1)
BUN SERPL-MCNC: 40 MG/DL (ref 5–25)
CALCIUM SERPL-MCNC: 8.5 MG/DL (ref 8.3–10.1)
CHLORIDE SERPL-SCNC: 110 MMOL/L (ref 100–108)
CO2 SERPL-SCNC: 29 MMOL/L (ref 21–32)
CREAT SERPL-MCNC: 2.36 MG/DL (ref 0.6–1.3)
EOSINOPHIL # BLD AUTO: 0.21 THOUSAND/ΜL (ref 0–0.61)
EOSINOPHIL NFR BLD AUTO: 3 % (ref 0–6)
ERYTHROCYTE [DISTWIDTH] IN BLOOD BY AUTOMATED COUNT: 19.9 % (ref 11.6–15.1)
GFR SERPL CREATININE-BSD FRML MDRD: 25 ML/MIN/1.73SQ M
GLUCOSE SERPL-MCNC: 70 MG/DL (ref 65–140)
HCT VFR BLD AUTO: 29.8 % (ref 36.5–49.3)
HGB BLD-MCNC: 9.2 G/DL (ref 12–17)
IMM GRANULOCYTES # BLD AUTO: 0.03 THOUSAND/UL (ref 0–0.2)
IMM GRANULOCYTES NFR BLD AUTO: 0 % (ref 0–2)
INR PPP: 2.5 (ref 0.84–1.19)
LYMPHOCYTES # BLD AUTO: 1.77 THOUSANDS/ΜL (ref 0.6–4.47)
LYMPHOCYTES NFR BLD AUTO: 26 % (ref 14–44)
MCH RBC QN AUTO: 26.7 PG (ref 26.8–34.3)
MCHC RBC AUTO-ENTMCNC: 30.9 G/DL (ref 31.4–37.4)
MCV RBC AUTO: 86 FL (ref 82–98)
MONOCYTES # BLD AUTO: 1.02 THOUSAND/ΜL (ref 0.17–1.22)
MONOCYTES NFR BLD AUTO: 15 % (ref 4–12)
NEUTROPHILS # BLD AUTO: 3.82 THOUSANDS/ΜL (ref 1.85–7.62)
NEUTS SEG NFR BLD AUTO: 56 % (ref 43–75)
NRBC BLD AUTO-RTO: 0 /100 WBCS
PLATELET # BLD AUTO: 217 THOUSANDS/UL (ref 149–390)
PMV BLD AUTO: 9.9 FL (ref 8.9–12.7)
POTASSIUM SERPL-SCNC: 3.8 MMOL/L (ref 3.5–5.3)
PROCALCITONIN SERPL-MCNC: 0.31 NG/ML
PROTHROMBIN TIME: 26.5 SECONDS (ref 11.6–14.5)
RBC # BLD AUTO: 3.45 MILLION/UL (ref 3.88–5.62)
SODIUM SERPL-SCNC: 142 MMOL/L (ref 136–145)
WBC # BLD AUTO: 6.87 THOUSAND/UL (ref 4.31–10.16)

## 2020-05-10 PROCEDURE — 85610 PROTHROMBIN TIME: CPT | Performed by: STUDENT IN AN ORGANIZED HEALTH CARE EDUCATION/TRAINING PROGRAM

## 2020-05-10 PROCEDURE — 99238 HOSP IP/OBS DSCHRG MGMT 30/<: CPT | Performed by: FAMILY MEDICINE

## 2020-05-10 PROCEDURE — 84145 PROCALCITONIN (PCT): CPT | Performed by: FAMILY MEDICINE

## 2020-05-10 PROCEDURE — 85025 COMPLETE CBC W/AUTO DIFF WBC: CPT | Performed by: FAMILY MEDICINE

## 2020-05-10 PROCEDURE — 99232 SBSQ HOSP IP/OBS MODERATE 35: CPT | Performed by: INTERNAL MEDICINE

## 2020-05-10 PROCEDURE — 80048 BASIC METABOLIC PNL TOTAL CA: CPT | Performed by: FAMILY MEDICINE

## 2020-05-10 RX ORDER — FUROSEMIDE 40 MG/1
40 TABLET ORAL 2 TIMES DAILY
Qty: 60 TABLET | Refills: 0 | Status: SHIPPED | OUTPATIENT
Start: 2020-05-10 | End: 2020-05-20 | Stop reason: CLARIF

## 2020-05-10 RX ORDER — FUROSEMIDE 40 MG/1
40 TABLET ORAL
Status: DISCONTINUED | OUTPATIENT
Start: 2020-05-10 | End: 2020-05-10 | Stop reason: HOSPADM

## 2020-05-10 RX ADMIN — TAMSULOSIN HYDROCHLORIDE 0.4 MG: 0.4 CAPSULE ORAL at 15:33

## 2020-05-10 RX ADMIN — AMLODIPINE BESYLATE 5 MG: 5 TABLET ORAL at 09:38

## 2020-05-10 RX ADMIN — FUROSEMIDE 40 MG: 40 TABLET ORAL at 15:33

## 2020-05-10 RX ADMIN — FUROSEMIDE 40 MG: 40 TABLET ORAL at 09:38

## 2020-05-10 RX ADMIN — METOPROLOL TARTRATE 12.5 MG: 25 TABLET ORAL at 09:38

## 2020-05-10 RX ADMIN — ATORVASTATIN CALCIUM 40 MG: 40 TABLET, FILM COATED ORAL at 09:38

## 2020-05-10 RX ADMIN — FERROUS SULFATE TAB 325 MG (65 MG ELEMENTAL FE) 325 MG: 325 (65 FE) TAB at 09:38

## 2020-05-11 ENCOUNTER — TELEPHONE (OUTPATIENT)
Dept: NEPHROLOGY | Facility: CLINIC | Age: 80
End: 2020-05-11

## 2020-05-11 ENCOUNTER — TRANSITIONAL CARE MANAGEMENT (OUTPATIENT)
Dept: FAMILY MEDICINE CLINIC | Facility: CLINIC | Age: 80
End: 2020-05-11

## 2020-05-12 ENCOUNTER — APPOINTMENT (OUTPATIENT)
Dept: LAB | Facility: HOSPITAL | Age: 80
End: 2020-05-12
Attending: INTERNAL MEDICINE
Payer: COMMERCIAL

## 2020-05-12 ENCOUNTER — ANTICOAG VISIT (OUTPATIENT)
Dept: CARDIOLOGY CLINIC | Facility: CLINIC | Age: 80
End: 2020-05-12

## 2020-05-12 DIAGNOSIS — N18.30 BENIGN HYPERTENSION WITH CKD (CHRONIC KIDNEY DISEASE) STAGE III (HCC): Chronic | ICD-10-CM

## 2020-05-12 DIAGNOSIS — Z51.81 ANTICOAGULATION GOAL OF INR 2 TO 3: ICD-10-CM

## 2020-05-12 DIAGNOSIS — Z79.01 ANTICOAGULATION GOAL OF INR 2 TO 3: ICD-10-CM

## 2020-05-12 DIAGNOSIS — I12.9 BENIGN HYPERTENSION WITH CKD (CHRONIC KIDNEY DISEASE) STAGE III (HCC): Chronic | ICD-10-CM

## 2020-05-12 LAB
ANION GAP SERPL CALCULATED.3IONS-SCNC: 6 MMOL/L (ref 4–13)
BUN SERPL-MCNC: 43 MG/DL (ref 5–25)
CALCIUM SERPL-MCNC: 8.5 MG/DL (ref 8.3–10.1)
CHLORIDE SERPL-SCNC: 108 MMOL/L (ref 100–108)
CO2 SERPL-SCNC: 27 MMOL/L (ref 21–32)
CREAT SERPL-MCNC: 3.18 MG/DL (ref 0.6–1.3)
GFR SERPL CREATININE-BSD FRML MDRD: 18 ML/MIN/1.73SQ M
GLUCOSE SERPL-MCNC: 113 MG/DL (ref 65–140)
INR PPP: 3.01 (ref 0.84–1.19)
POTASSIUM SERPL-SCNC: 4.1 MMOL/L (ref 3.5–5.3)
PROTHROMBIN TIME: 30.7 SECONDS (ref 11.6–14.5)
SODIUM SERPL-SCNC: 141 MMOL/L (ref 136–145)

## 2020-05-12 PROCEDURE — 85610 PROTHROMBIN TIME: CPT

## 2020-05-12 PROCEDURE — 36415 COLL VENOUS BLD VENIPUNCTURE: CPT

## 2020-05-12 PROCEDURE — 80048 BASIC METABOLIC PNL TOTAL CA: CPT

## 2020-05-13 DIAGNOSIS — N18.30 CHRONIC KIDNEY DISEASE, STAGE III (MODERATE) (HCC): ICD-10-CM

## 2020-05-13 DIAGNOSIS — I12.9 BENIGN HYPERTENSION WITH CKD (CHRONIC KIDNEY DISEASE) STAGE III (HCC): Primary | ICD-10-CM

## 2020-05-13 DIAGNOSIS — N18.30 BENIGN HYPERTENSION WITH CKD (CHRONIC KIDNEY DISEASE) STAGE III (HCC): Primary | ICD-10-CM

## 2020-05-15 ENCOUNTER — TELEPHONE (OUTPATIENT)
Dept: CARDIAC SURGERY | Facility: CLINIC | Age: 80
End: 2020-05-15

## 2020-05-15 ENCOUNTER — TELEMEDICINE (OUTPATIENT)
Dept: FAMILY MEDICINE CLINIC | Facility: CLINIC | Age: 80
End: 2020-05-15

## 2020-05-15 ENCOUNTER — TELEPHONE (OUTPATIENT)
Dept: NEPHROLOGY | Facility: CLINIC | Age: 80
End: 2020-05-15

## 2020-05-15 VITALS — DIASTOLIC BLOOD PRESSURE: 86 MMHG | SYSTOLIC BLOOD PRESSURE: 127 MMHG | WEIGHT: 181 LBS | BODY MASS INDEX: 27.93 KG/M2

## 2020-05-15 DIAGNOSIS — N17.9 AKI (ACUTE KIDNEY INJURY) (HCC): ICD-10-CM

## 2020-05-15 DIAGNOSIS — I48.0 PAROXYSMAL ATRIAL FIBRILLATION (HCC): ICD-10-CM

## 2020-05-15 DIAGNOSIS — R06.02 SOBOE (SHORTNESS OF BREATH ON EXERTION): Primary | ICD-10-CM

## 2020-05-15 PROCEDURE — 99214 OFFICE O/P EST MOD 30 MIN: CPT | Performed by: FAMILY MEDICINE

## 2020-05-16 ENCOUNTER — TRANSCRIBE ORDERS (OUTPATIENT)
Dept: LAB | Facility: HOSPITAL | Age: 80
End: 2020-05-16

## 2020-05-16 ENCOUNTER — APPOINTMENT (OUTPATIENT)
Dept: LAB | Facility: HOSPITAL | Age: 80
End: 2020-05-16
Attending: INTERNAL MEDICINE
Payer: COMMERCIAL

## 2020-05-16 DIAGNOSIS — N18.30 BENIGN HYPERTENSION WITH CKD (CHRONIC KIDNEY DISEASE) STAGE III (HCC): ICD-10-CM

## 2020-05-16 DIAGNOSIS — N18.30 CHRONIC KIDNEY DISEASE, STAGE III (MODERATE) (HCC): ICD-10-CM

## 2020-05-16 DIAGNOSIS — D47.2 MGUS (MONOCLONAL GAMMOPATHY OF UNKNOWN SIGNIFICANCE): ICD-10-CM

## 2020-05-16 DIAGNOSIS — I12.9 BENIGN HYPERTENSION WITH CKD (CHRONIC KIDNEY DISEASE) STAGE III (HCC): ICD-10-CM

## 2020-05-16 LAB
ALBUMIN SERPL BCP-MCNC: 2.8 G/DL (ref 3.5–5)
ALP SERPL-CCNC: 156 U/L (ref 46–116)
ALT SERPL W P-5'-P-CCNC: 22 U/L (ref 12–78)
ANION GAP SERPL CALCULATED.3IONS-SCNC: 3 MMOL/L (ref 4–13)
AST SERPL W P-5'-P-CCNC: 42 U/L (ref 5–45)
BASOPHILS # BLD AUTO: 0.02 THOUSANDS/ΜL (ref 0–0.1)
BASOPHILS NFR BLD AUTO: 0 % (ref 0–1)
BILIRUB SERPL-MCNC: 0.65 MG/DL (ref 0.2–1)
BUN SERPL-MCNC: 37 MG/DL (ref 5–25)
CALCIUM SERPL-MCNC: 8.4 MG/DL (ref 8.3–10.1)
CHLORIDE SERPL-SCNC: 107 MMOL/L (ref 100–108)
CO2 SERPL-SCNC: 31 MMOL/L (ref 21–32)
CREAT SERPL-MCNC: 2.23 MG/DL (ref 0.6–1.3)
EOSINOPHIL # BLD AUTO: 0.23 THOUSAND/ΜL (ref 0–0.61)
EOSINOPHIL NFR BLD AUTO: 3 % (ref 0–6)
ERYTHROCYTE [DISTWIDTH] IN BLOOD BY AUTOMATED COUNT: 20.2 % (ref 11.6–15.1)
FERRITIN SERPL-MCNC: 41 NG/ML (ref 8–388)
GFR SERPL CREATININE-BSD FRML MDRD: 27 ML/MIN/1.73SQ M
GLUCOSE P FAST SERPL-MCNC: 85 MG/DL (ref 65–99)
HCT VFR BLD AUTO: 32.9 % (ref 36.5–49.3)
HGB BLD-MCNC: 10 G/DL (ref 12–17)
IGA SERPL-MCNC: 514 MG/DL (ref 70–400)
IGG SERPL-MCNC: 2860 MG/DL (ref 700–1600)
IGM SERPL-MCNC: 206 MG/DL (ref 40–230)
IMM GRANULOCYTES # BLD AUTO: 0.02 THOUSAND/UL (ref 0–0.2)
IMM GRANULOCYTES NFR BLD AUTO: 0 % (ref 0–2)
IRON SATN MFR SERPL: 30 %
IRON SERPL-MCNC: 88 UG/DL (ref 65–175)
LYMPHOCYTES # BLD AUTO: 1.27 THOUSANDS/ΜL (ref 0.6–4.47)
LYMPHOCYTES NFR BLD AUTO: 18 % (ref 14–44)
MCH RBC QN AUTO: 26.8 PG (ref 26.8–34.3)
MCHC RBC AUTO-ENTMCNC: 30.4 G/DL (ref 31.4–37.4)
MCV RBC AUTO: 88 FL (ref 82–98)
MONOCYTES # BLD AUTO: 1.25 THOUSAND/ΜL (ref 0.17–1.22)
MONOCYTES NFR BLD AUTO: 18 % (ref 4–12)
NEUTROPHILS # BLD AUTO: 4.11 THOUSANDS/ΜL (ref 1.85–7.62)
NEUTS SEG NFR BLD AUTO: 61 % (ref 43–75)
NRBC BLD AUTO-RTO: 0 /100 WBCS
PLATELET # BLD AUTO: 266 THOUSANDS/UL (ref 149–390)
PMV BLD AUTO: 10.8 FL (ref 8.9–12.7)
POTASSIUM SERPL-SCNC: 3.7 MMOL/L (ref 3.5–5.3)
PROT SERPL-MCNC: 8.4 G/DL (ref 6.4–8.2)
RBC # BLD AUTO: 3.73 MILLION/UL (ref 3.88–5.62)
SODIUM SERPL-SCNC: 141 MMOL/L (ref 136–145)
TIBC SERPL-MCNC: 293 UG/DL (ref 250–450)
WBC # BLD AUTO: 6.9 THOUSAND/UL (ref 4.31–10.16)

## 2020-05-16 PROCEDURE — 83550 IRON BINDING TEST: CPT

## 2020-05-16 PROCEDURE — 84165 PROTEIN E-PHORESIS SERUM: CPT

## 2020-05-16 PROCEDURE — 85025 COMPLETE CBC W/AUTO DIFF WBC: CPT

## 2020-05-16 PROCEDURE — 84165 PROTEIN E-PHORESIS SERUM: CPT | Performed by: PATHOLOGY

## 2020-05-16 PROCEDURE — 83540 ASSAY OF IRON: CPT

## 2020-05-16 PROCEDURE — 82728 ASSAY OF FERRITIN: CPT

## 2020-05-16 PROCEDURE — 82784 ASSAY IGA/IGD/IGG/IGM EACH: CPT

## 2020-05-16 PROCEDURE — 80053 COMPREHEN METABOLIC PANEL: CPT

## 2020-05-16 PROCEDURE — 83883 ASSAY NEPHELOMETRY NOT SPEC: CPT

## 2020-05-18 ENCOUNTER — ANTICOAG VISIT (OUTPATIENT)
Dept: CARDIOLOGY CLINIC | Facility: CLINIC | Age: 80
End: 2020-05-18

## 2020-05-18 DIAGNOSIS — Z79.01 ANTICOAGULATION GOAL OF INR 2 TO 3: ICD-10-CM

## 2020-05-18 DIAGNOSIS — Z51.81 ANTICOAGULATION GOAL OF INR 2 TO 3: ICD-10-CM

## 2020-05-18 LAB
ALBUMIN SERPL ELPH-MCNC: 3.28 G/DL (ref 3.5–5)
ALBUMIN SERPL ELPH-MCNC: 40 % (ref 52–65)
ALPHA1 GLOB SERPL ELPH-MCNC: 0.31 G/DL (ref 0.1–0.4)
ALPHA1 GLOB SERPL ELPH-MCNC: 3.8 % (ref 2.5–5)
ALPHA2 GLOB SERPL ELPH-MCNC: 0.71 G/DL (ref 0.4–1.2)
ALPHA2 GLOB SERPL ELPH-MCNC: 8.6 % (ref 7–13)
BETA GLOB ABNORMAL SERPL ELPH-MCNC: 0.48 G/DL (ref 0.4–0.8)
BETA1 GLOB SERPL ELPH-MCNC: 5.9 % (ref 5–13)
BETA2 GLOB SERPL ELPH-MCNC: 7.2 % (ref 2–8)
BETA2+GAMMA GLOB SERPL ELPH-MCNC: 0.59 G/DL (ref 0.2–0.5)
GAMMA GLOB ABNORMAL SERPL ELPH-MCNC: 2.83 G/DL (ref 0.5–1.6)
GAMMA GLOB SERPL ELPH-MCNC: 34.5 % (ref 12–22)
IGG/ALB SER: 0.67 {RATIO} (ref 1.1–1.8)
M PROTEIN 1 MFR SERPL ELPH: 2.6 %
M PROTEIN 1 SERPL ELPH-MCNC: 0.21 G/DL
PROT PATTERN SERPL ELPH-IMP: ABNORMAL
PROT SERPL-MCNC: 8.2 G/DL (ref 6.4–8.2)

## 2020-05-19 ENCOUNTER — TELEPHONE (OUTPATIENT)
Dept: NEPHROLOGY | Facility: CLINIC | Age: 80
End: 2020-05-19

## 2020-05-19 LAB
KAPPA LC FREE SER-MCNC: 186.3 MG/L (ref 3.3–19.4)
KAPPA LC FREE/LAMBDA FREE SER: 1.44 {RATIO} (ref 0.26–1.65)
LAMBDA LC FREE SERPL-MCNC: 129 MG/L (ref 5.7–26.3)

## 2020-05-20 ENCOUNTER — TELEMEDICINE (OUTPATIENT)
Dept: NEPHROLOGY | Facility: CLINIC | Age: 80
End: 2020-05-20
Payer: COMMERCIAL

## 2020-05-20 DIAGNOSIS — E78.5 HYPERLIPIDEMIA, UNSPECIFIED HYPERLIPIDEMIA TYPE: ICD-10-CM

## 2020-05-20 DIAGNOSIS — N17.9 AKI (ACUTE KIDNEY INJURY) (HCC): ICD-10-CM

## 2020-05-20 DIAGNOSIS — I12.9 BENIGN HYPERTENSION WITH CKD (CHRONIC KIDNEY DISEASE) STAGE III (HCC): Primary | Chronic | ICD-10-CM

## 2020-05-20 DIAGNOSIS — I10 BENIGN ESSENTIAL HYPERTENSION: Chronic | ICD-10-CM

## 2020-05-20 DIAGNOSIS — I71.01 DISSECTION OF THORACIC AORTA (HCC): ICD-10-CM

## 2020-05-20 DIAGNOSIS — R60.9 EDEMA, UNSPECIFIED TYPE: ICD-10-CM

## 2020-05-20 DIAGNOSIS — N18.30 BENIGN HYPERTENSION WITH CKD (CHRONIC KIDNEY DISEASE) STAGE III (HCC): Primary | Chronic | ICD-10-CM

## 2020-05-20 DIAGNOSIS — D47.2 MONOCLONAL GAMMOPATHY: ICD-10-CM

## 2020-05-20 PROCEDURE — 1111F DSCHRG MED/CURRENT MED MERGE: CPT | Performed by: INTERNAL MEDICINE

## 2020-05-20 PROCEDURE — 1160F RVW MEDS BY RX/DR IN RCRD: CPT | Performed by: INTERNAL MEDICINE

## 2020-05-20 PROCEDURE — 99214 OFFICE O/P EST MOD 30 MIN: CPT | Performed by: INTERNAL MEDICINE

## 2020-05-20 RX ORDER — BUMETANIDE 2 MG/1
2 TABLET ORAL DAILY
Qty: 30 TABLET | Refills: 5 | Status: SHIPPED | OUTPATIENT
Start: 2020-05-20 | End: 2020-08-31

## 2020-05-27 ENCOUNTER — TELEPHONE (OUTPATIENT)
Dept: NEPHROLOGY | Facility: CLINIC | Age: 80
End: 2020-05-27

## 2020-05-27 ENCOUNTER — ANTICOAG VISIT (OUTPATIENT)
Dept: CARDIOLOGY CLINIC | Facility: CLINIC | Age: 80
End: 2020-05-27

## 2020-05-27 ENCOUNTER — APPOINTMENT (OUTPATIENT)
Dept: LAB | Facility: HOSPITAL | Age: 80
End: 2020-05-27
Attending: INTERNAL MEDICINE
Payer: COMMERCIAL

## 2020-05-27 DIAGNOSIS — Z79.01 ANTICOAGULATION GOAL OF INR 2 TO 3: ICD-10-CM

## 2020-05-27 DIAGNOSIS — N18.30 BENIGN HYPERTENSION WITH CKD (CHRONIC KIDNEY DISEASE) STAGE III (HCC): Primary | ICD-10-CM

## 2020-05-27 DIAGNOSIS — I12.9 BENIGN HYPERTENSION WITH CKD (CHRONIC KIDNEY DISEASE) STAGE III (HCC): Chronic | ICD-10-CM

## 2020-05-27 DIAGNOSIS — Z51.81 ANTICOAGULATION GOAL OF INR 2 TO 3: ICD-10-CM

## 2020-05-27 DIAGNOSIS — R60.9 EDEMA, UNSPECIFIED TYPE: ICD-10-CM

## 2020-05-27 DIAGNOSIS — N18.30 BENIGN HYPERTENSION WITH CKD (CHRONIC KIDNEY DISEASE) STAGE III (HCC): Chronic | ICD-10-CM

## 2020-05-27 DIAGNOSIS — I12.9 BENIGN HYPERTENSION WITH CKD (CHRONIC KIDNEY DISEASE) STAGE III (HCC): Primary | ICD-10-CM

## 2020-05-27 LAB
ANION GAP SERPL CALCULATED.3IONS-SCNC: 3 MMOL/L (ref 4–13)
BUN SERPL-MCNC: 38 MG/DL (ref 5–25)
CALCIUM SERPL-MCNC: 8.6 MG/DL (ref 8.3–10.1)
CHLORIDE SERPL-SCNC: 108 MMOL/L (ref 100–108)
CO2 SERPL-SCNC: 30 MMOL/L (ref 21–32)
CREAT SERPL-MCNC: 2 MG/DL (ref 0.6–1.3)
GFR SERPL CREATININE-BSD FRML MDRD: 31 ML/MIN/1.73SQ M
GLUCOSE P FAST SERPL-MCNC: 86 MG/DL (ref 65–99)
POTASSIUM SERPL-SCNC: 4.4 MMOL/L (ref 3.5–5.3)
SODIUM SERPL-SCNC: 141 MMOL/L (ref 136–145)

## 2020-05-27 PROCEDURE — 80048 BASIC METABOLIC PNL TOTAL CA: CPT

## 2020-05-29 ENCOUNTER — TELEPHONE (OUTPATIENT)
Dept: CARDIAC SURGERY | Facility: CLINIC | Age: 80
End: 2020-05-29

## 2020-06-01 ENCOUNTER — OFFICE VISIT (OUTPATIENT)
Dept: CARDIAC SURGERY | Facility: CLINIC | Age: 80
End: 2020-06-01
Payer: COMMERCIAL

## 2020-06-01 VITALS
WEIGHT: 170.8 LBS | HEART RATE: 77 BPM | HEIGHT: 68 IN | DIASTOLIC BLOOD PRESSURE: 80 MMHG | BODY MASS INDEX: 25.88 KG/M2 | SYSTOLIC BLOOD PRESSURE: 130 MMHG

## 2020-06-01 DIAGNOSIS — Z98.890 S/P AORTIC DISSECTION REPAIR: ICD-10-CM

## 2020-06-01 DIAGNOSIS — I71.01 DISSECTION OF THORACIC AORTA (HCC): Primary | ICD-10-CM

## 2020-06-01 PROCEDURE — 1160F RVW MEDS BY RX/DR IN RCRD: CPT | Performed by: PHYSICIAN ASSISTANT

## 2020-06-01 PROCEDURE — 4040F PNEUMOC VAC/ADMIN/RCVD: CPT | Performed by: PHYSICIAN ASSISTANT

## 2020-06-01 PROCEDURE — 99215 OFFICE O/P EST HI 40 MIN: CPT | Performed by: PHYSICIAN ASSISTANT

## 2020-06-01 PROCEDURE — 1036F TOBACCO NON-USER: CPT | Performed by: PHYSICIAN ASSISTANT

## 2020-06-01 PROCEDURE — 3075F SYST BP GE 130 - 139MM HG: CPT | Performed by: PHYSICIAN ASSISTANT

## 2020-06-01 PROCEDURE — 1111F DSCHRG MED/CURRENT MED MERGE: CPT | Performed by: PHYSICIAN ASSISTANT

## 2020-06-01 PROCEDURE — 3079F DIAST BP 80-89 MM HG: CPT | Performed by: PHYSICIAN ASSISTANT

## 2020-06-01 PROCEDURE — 3008F BODY MASS INDEX DOCD: CPT | Performed by: PHYSICIAN ASSISTANT

## 2020-06-01 RX ORDER — AMLODIPINE BESYLATE 5 MG/1
TABLET ORAL
COMMUNITY
Start: 2020-05-29 | End: 2020-06-22

## 2020-06-02 DIAGNOSIS — N17.9 ACUTE KIDNEY INJURY SUPERIMPOSED ON CHRONIC KIDNEY DISEASE (HCC): ICD-10-CM

## 2020-06-02 DIAGNOSIS — Z98.890 S/P AORTA REPAIR: ICD-10-CM

## 2020-06-02 DIAGNOSIS — N18.9 ACUTE KIDNEY INJURY SUPERIMPOSED ON CHRONIC KIDNEY DISEASE (HCC): ICD-10-CM

## 2020-06-02 DIAGNOSIS — N17.9 AKI (ACUTE KIDNEY INJURY) (HCC): ICD-10-CM

## 2020-06-02 DIAGNOSIS — I71.01 DISSECTION OF THORACIC AORTA (HCC): ICD-10-CM

## 2020-06-02 RX ORDER — ATORVASTATIN CALCIUM 40 MG/1
40 TABLET, FILM COATED ORAL DAILY
Qty: 90 TABLET | Refills: 0 | Status: CANCELLED | OUTPATIENT
Start: 2020-06-02

## 2020-06-03 DIAGNOSIS — Z98.890 S/P AORTA REPAIR: ICD-10-CM

## 2020-06-03 DIAGNOSIS — N18.9 ACUTE KIDNEY INJURY SUPERIMPOSED ON CHRONIC KIDNEY DISEASE (HCC): ICD-10-CM

## 2020-06-03 DIAGNOSIS — N17.9 AKI (ACUTE KIDNEY INJURY) (HCC): ICD-10-CM

## 2020-06-03 DIAGNOSIS — N17.9 ACUTE KIDNEY INJURY SUPERIMPOSED ON CHRONIC KIDNEY DISEASE (HCC): ICD-10-CM

## 2020-06-03 DIAGNOSIS — I71.01 DISSECTION OF THORACIC AORTA (HCC): ICD-10-CM

## 2020-06-03 RX ORDER — ATORVASTATIN CALCIUM 40 MG/1
40 TABLET, FILM COATED ORAL DAILY
Qty: 90 TABLET | Refills: 0 | Status: SHIPPED | OUTPATIENT
Start: 2020-06-03 | End: 2020-08-19

## 2020-06-04 ENCOUNTER — TELEPHONE (OUTPATIENT)
Dept: FAMILY MEDICINE CLINIC | Facility: CLINIC | Age: 80
End: 2020-06-04

## 2020-06-05 ENCOUNTER — TELEPHONE (OUTPATIENT)
Dept: FAMILY MEDICINE CLINIC | Facility: CLINIC | Age: 80
End: 2020-06-05

## 2020-06-05 DIAGNOSIS — R06.00 DYSPNEA ON EXERTION: ICD-10-CM

## 2020-06-10 ENCOUNTER — OFFICE VISIT (OUTPATIENT)
Dept: HEMATOLOGY ONCOLOGY | Facility: CLINIC | Age: 80
End: 2020-06-10
Payer: COMMERCIAL

## 2020-06-10 VITALS
TEMPERATURE: 96.7 F | BODY MASS INDEX: 27.09 KG/M2 | HEIGHT: 67 IN | SYSTOLIC BLOOD PRESSURE: 160 MMHG | HEART RATE: 73 BPM | WEIGHT: 172.6 LBS | RESPIRATION RATE: 18 BRPM | DIASTOLIC BLOOD PRESSURE: 100 MMHG | OXYGEN SATURATION: 97 %

## 2020-06-10 DIAGNOSIS — D47.2 MONOCLONAL GAMMOPATHY: Primary | ICD-10-CM

## 2020-06-10 PROBLEM — R05.9 COUGH IN ADULT: Status: RESOLVED | Noted: 2020-05-08 | Resolved: 2020-06-10

## 2020-06-10 PROBLEM — M62.838 MUSCLE SPASM: Status: RESOLVED | Noted: 2020-01-24 | Resolved: 2020-06-10

## 2020-06-10 PROBLEM — J18.9 PNEUMONIA: Status: RESOLVED | Noted: 2020-01-07 | Resolved: 2020-06-10

## 2020-06-10 PROCEDURE — 3008F BODY MASS INDEX DOCD: CPT | Performed by: INTERNAL MEDICINE

## 2020-06-10 PROCEDURE — 1160F RVW MEDS BY RX/DR IN RCRD: CPT | Performed by: INTERNAL MEDICINE

## 2020-06-10 PROCEDURE — 99214 OFFICE O/P EST MOD 30 MIN: CPT | Performed by: INTERNAL MEDICINE

## 2020-06-10 PROCEDURE — 1111F DSCHRG MED/CURRENT MED MERGE: CPT | Performed by: INTERNAL MEDICINE

## 2020-06-10 PROCEDURE — 4040F PNEUMOC VAC/ADMIN/RCVD: CPT | Performed by: INTERNAL MEDICINE

## 2020-06-10 PROCEDURE — 3077F SYST BP >= 140 MM HG: CPT | Performed by: INTERNAL MEDICINE

## 2020-06-10 PROCEDURE — 3080F DIAST BP >= 90 MM HG: CPT | Performed by: INTERNAL MEDICINE

## 2020-06-10 PROCEDURE — 1036F TOBACCO NON-USER: CPT | Performed by: INTERNAL MEDICINE

## 2020-06-10 RX ORDER — FUROSEMIDE 40 MG/1
40 TABLET ORAL 2 TIMES DAILY
Qty: 60 TABLET | Refills: 0 | OUTPATIENT
Start: 2020-06-10

## 2020-06-20 DIAGNOSIS — I10 HYPERTENSION, UNSPECIFIED TYPE: Primary | ICD-10-CM

## 2020-06-22 RX ORDER — AMLODIPINE BESYLATE 5 MG/1
TABLET ORAL
Qty: 30 TABLET | Refills: 3 | Status: SHIPPED | OUTPATIENT
Start: 2020-06-22 | End: 2020-06-24

## 2020-06-24 ENCOUNTER — OFFICE VISIT (OUTPATIENT)
Dept: CARDIOLOGY CLINIC | Facility: CLINIC | Age: 80
End: 2020-06-24
Payer: COMMERCIAL

## 2020-06-24 VITALS
TEMPERATURE: 97.3 F | HEIGHT: 67 IN | WEIGHT: 170 LBS | HEART RATE: 58 BPM | BODY MASS INDEX: 26.68 KG/M2 | DIASTOLIC BLOOD PRESSURE: 82 MMHG | SYSTOLIC BLOOD PRESSURE: 128 MMHG

## 2020-06-24 DIAGNOSIS — Z98.890 S/P AORTA REPAIR: ICD-10-CM

## 2020-06-24 DIAGNOSIS — I48.0 PAROXYSMAL ATRIAL FIBRILLATION (HCC): Primary | ICD-10-CM

## 2020-06-24 DIAGNOSIS — I10 BENIGN ESSENTIAL HYPERTENSION: Chronic | ICD-10-CM

## 2020-06-24 DIAGNOSIS — I71.01 DISSECTION OF THORACIC AORTA (HCC): ICD-10-CM

## 2020-06-24 PROCEDURE — 1111F DSCHRG MED/CURRENT MED MERGE: CPT | Performed by: INTERNAL MEDICINE

## 2020-06-24 PROCEDURE — 1160F RVW MEDS BY RX/DR IN RCRD: CPT | Performed by: INTERNAL MEDICINE

## 2020-06-24 PROCEDURE — 1036F TOBACCO NON-USER: CPT | Performed by: INTERNAL MEDICINE

## 2020-06-24 PROCEDURE — 93000 ELECTROCARDIOGRAM COMPLETE: CPT | Performed by: INTERNAL MEDICINE

## 2020-06-24 PROCEDURE — 4040F PNEUMOC VAC/ADMIN/RCVD: CPT | Performed by: INTERNAL MEDICINE

## 2020-06-24 PROCEDURE — 3074F SYST BP LT 130 MM HG: CPT | Performed by: INTERNAL MEDICINE

## 2020-06-24 PROCEDURE — 3008F BODY MASS INDEX DOCD: CPT | Performed by: INTERNAL MEDICINE

## 2020-06-24 PROCEDURE — 99214 OFFICE O/P EST MOD 30 MIN: CPT | Performed by: INTERNAL MEDICINE

## 2020-06-24 PROCEDURE — 3079F DIAST BP 80-89 MM HG: CPT | Performed by: INTERNAL MEDICINE

## 2020-06-24 RX ORDER — DIPHENHYDRAMINE HCL 25 MG
25 TABLET ORAL EVERY 6 HOURS PRN
COMMUNITY
End: 2022-01-01

## 2020-07-01 ENCOUNTER — TELEPHONE (OUTPATIENT)
Dept: FAMILY MEDICINE CLINIC | Facility: CLINIC | Age: 80
End: 2020-07-01

## 2020-07-01 NOTE — TELEPHONE ENCOUNTER
Called patient to update chart prior to appointment for tomorrow, per protocol  Left message asking patient to call back the office

## 2020-07-08 ENCOUNTER — TRANSCRIBE ORDERS (OUTPATIENT)
Dept: LAB | Facility: HOSPITAL | Age: 80
End: 2020-07-08

## 2020-07-08 ENCOUNTER — APPOINTMENT (OUTPATIENT)
Dept: LAB | Facility: HOSPITAL | Age: 80
End: 2020-07-08
Attending: INTERNAL MEDICINE
Payer: COMMERCIAL

## 2020-07-08 ENCOUNTER — TELEMEDICINE (OUTPATIENT)
Dept: FAMILY MEDICINE CLINIC | Facility: CLINIC | Age: 80
End: 2020-07-08

## 2020-07-08 DIAGNOSIS — R05.8 COUGH WITH EXPOSURE TO COVID-19 VIRUS: ICD-10-CM

## 2020-07-08 DIAGNOSIS — Z20.822 COUGH WITH EXPOSURE TO COVID-19 VIRUS: ICD-10-CM

## 2020-07-08 DIAGNOSIS — N18.30 BENIGN HYPERTENSION WITH CKD (CHRONIC KIDNEY DISEASE) STAGE III (HCC): ICD-10-CM

## 2020-07-08 DIAGNOSIS — D47.2 MONOCLONAL GAMMOPATHY: ICD-10-CM

## 2020-07-08 DIAGNOSIS — I12.9 BENIGN HYPERTENSION WITH CKD (CHRONIC KIDNEY DISEASE) STAGE III (HCC): ICD-10-CM

## 2020-07-08 DIAGNOSIS — R05.8 COUGH WITH EXPOSURE TO COVID-19 VIRUS: Primary | ICD-10-CM

## 2020-07-08 DIAGNOSIS — Z20.822 COUGH WITH EXPOSURE TO COVID-19 VIRUS: Primary | ICD-10-CM

## 2020-07-08 LAB
ALBUMIN SERPL BCP-MCNC: 2.8 G/DL (ref 3.5–5)
ALP SERPL-CCNC: 130 U/L (ref 46–116)
ALT SERPL W P-5'-P-CCNC: 33 U/L (ref 12–78)
ANION GAP SERPL CALCULATED.3IONS-SCNC: 3 MMOL/L (ref 4–13)
AST SERPL W P-5'-P-CCNC: 75 U/L (ref 5–45)
BASOPHILS # BLD AUTO: 0.02 THOUSANDS/ΜL (ref 0–0.1)
BASOPHILS NFR BLD AUTO: 0 % (ref 0–1)
BILIRUB SERPL-MCNC: 0.76 MG/DL (ref 0.2–1)
BUN SERPL-MCNC: 27 MG/DL (ref 5–25)
CALCIUM SERPL-MCNC: 8.7 MG/DL (ref 8.3–10.1)
CHLORIDE SERPL-SCNC: 109 MMOL/L (ref 100–108)
CO2 SERPL-SCNC: 27 MMOL/L (ref 21–32)
CREAT SERPL-MCNC: 1.86 MG/DL (ref 0.6–1.3)
CRP SERPL QL: 13.9 MG/L
EOSINOPHIL # BLD AUTO: 0.05 THOUSAND/ΜL (ref 0–0.61)
EOSINOPHIL NFR BLD AUTO: 1 % (ref 0–6)
ERYTHROCYTE [DISTWIDTH] IN BLOOD BY AUTOMATED COUNT: 17.6 % (ref 11.6–15.1)
GFR SERPL CREATININE-BSD FRML MDRD: 34 ML/MIN/1.73SQ M
GLUCOSE P FAST SERPL-MCNC: 79 MG/DL (ref 65–99)
HCT VFR BLD AUTO: 35.9 % (ref 36.5–49.3)
HGB BLD-MCNC: 11.1 G/DL (ref 12–17)
IGA SERPL-MCNC: 547 MG/DL (ref 70–400)
IGG SERPL-MCNC: 3060 MG/DL (ref 700–1600)
IGM SERPL-MCNC: 217 MG/DL (ref 40–230)
IMM GRANULOCYTES # BLD AUTO: 0.02 THOUSAND/UL (ref 0–0.2)
IMM GRANULOCYTES NFR BLD AUTO: 0 % (ref 0–2)
LYMPHOCYTES # BLD AUTO: 1.88 THOUSANDS/ΜL (ref 0.6–4.47)
LYMPHOCYTES NFR BLD AUTO: 33 % (ref 14–44)
MCH RBC QN AUTO: 27.6 PG (ref 26.8–34.3)
MCHC RBC AUTO-ENTMCNC: 30.9 G/DL (ref 31.4–37.4)
MCV RBC AUTO: 89 FL (ref 82–98)
MONOCYTES # BLD AUTO: 1.74 THOUSAND/ΜL (ref 0.17–1.22)
MONOCYTES NFR BLD AUTO: 30 % (ref 4–12)
NEUTROPHILS # BLD AUTO: 2.03 THOUSANDS/ΜL (ref 1.85–7.62)
NEUTS SEG NFR BLD AUTO: 36 % (ref 43–75)
NRBC BLD AUTO-RTO: 0 /100 WBCS
PHOSPHATE SERPL-MCNC: 3 MG/DL (ref 2.3–4.1)
PLATELET # BLD AUTO: 174 THOUSANDS/UL (ref 149–390)
PMV BLD AUTO: 10.7 FL (ref 8.9–12.7)
POTASSIUM SERPL-SCNC: 3.6 MMOL/L (ref 3.5–5.3)
PROT SERPL-MCNC: 8.6 G/DL (ref 6.4–8.2)
PTH-INTACT SERPL-MCNC: 77.1 PG/ML (ref 18.4–80.1)
RBC # BLD AUTO: 4.02 MILLION/UL (ref 3.88–5.62)
SODIUM SERPL-SCNC: 139 MMOL/L (ref 136–145)
WBC # BLD AUTO: 5.74 THOUSAND/UL (ref 4.31–10.16)

## 2020-07-08 PROCEDURE — 84100 ASSAY OF PHOSPHORUS: CPT

## 2020-07-08 PROCEDURE — U0003 INFECTIOUS AGENT DETECTION BY NUCLEIC ACID (DNA OR RNA); SEVERE ACUTE RESPIRATORY SYNDROME CORONAVIRUS 2 (SARS-COV-2) (CORONAVIRUS DISEASE [COVID-19]), AMPLIFIED PROBE TECHNIQUE, MAKING USE OF HIGH THROUGHPUT TECHNOLOGIES AS DESCRIBED BY CMS-2020-01-R: HCPCS

## 2020-07-08 PROCEDURE — 85025 COMPLETE CBC W/AUTO DIFF WBC: CPT

## 2020-07-08 PROCEDURE — 82784 ASSAY IGA/IGD/IGG/IGM EACH: CPT

## 2020-07-08 PROCEDURE — 99214 OFFICE O/P EST MOD 30 MIN: CPT | Performed by: NURSE PRACTITIONER

## 2020-07-08 PROCEDURE — 86431 RHEUMATOID FACTOR QUANT: CPT

## 2020-07-08 PROCEDURE — 80053 COMPREHEN METABOLIC PANEL: CPT

## 2020-07-08 PROCEDURE — 83970 ASSAY OF PARATHORMONE: CPT

## 2020-07-08 PROCEDURE — 86334 IMMUNOFIX E-PHORESIS SERUM: CPT | Performed by: PATHOLOGY

## 2020-07-08 PROCEDURE — 86334 IMMUNOFIX E-PHORESIS SERUM: CPT

## 2020-07-08 PROCEDURE — 83883 ASSAY NEPHELOMETRY NOT SPEC: CPT

## 2020-07-08 PROCEDURE — 36415 COLL VENOUS BLD VENIPUNCTURE: CPT

## 2020-07-08 PROCEDURE — 86140 C-REACTIVE PROTEIN: CPT

## 2020-07-08 PROCEDURE — 86430 RHEUMATOID FACTOR TEST QUAL: CPT

## 2020-07-08 PROCEDURE — 84165 PROTEIN E-PHORESIS SERUM: CPT | Performed by: PATHOLOGY

## 2020-07-08 PROCEDURE — 84165 PROTEIN E-PHORESIS SERUM: CPT

## 2020-07-08 NOTE — PROGRESS NOTES
COVID-19 Virtual Visit     Assessment/Plan:    Problem List Items Addressed This Visit        Other    Cough with exposure to COVID-19 virus - Primary     Patient and wife both exposed to Covid via home care worker  Advised to go to respiratory clinic/ED at [facility] and patient refused but will go for testing  Explained that he has multiple risk factors and may require hospitalization  Spoke with patient and grand daughter  Relevant Orders    MISC COVID-19 TEST        This virtual check-in was done via telephone  Disposition:      I referred Jose Pulliam to one of our centralized sites for a COVID-19 swab    I spent 15 minutes with patient today in which greater than 50% of the time was spent in counseling/coordination of care regarding covid testing    Encounter provider EL Sun    Provider located at [place] [address]  [address] [facility] [address] [facility]   [phone]    Recent Visits  Date Type Provider Dept   07/01/20 Telephone Daivd Heimlich, [address] recent visits within past 7 days and meeting all other requirements     Today's Visits  Date Type Provider Dept   07/08/20 Telemedicine Ángel Reece 799 today's visits and meeting all other requirements     Future Appointments  Date Type Provider Dept   07/08/20 Telemedicine Speedy Dsa, [address]   Showing future appointments within next 150 days and meeting all other requirements        Patient agrees to participate in a virtual check in via telephone or video visit instead of presenting to the office to address urgent/immediate medical needs  Patient is aware this is a billable service  After connecting through telephone, the patient was identified by name and date of birth  Aneta Young was informed that this was a telemedicine visit and that the exam was being conducted confidentially over secure lines  My office door was closed   Other methods to assure confidentiality were taken   No one else was in the room  Oliver Sharpe acknowledged consent and understanding of privacy and security of the telemedicine visit  I informed the patient that I have reviewed his record in Epic and presented the opportunity for him to ask any questions regarding the visit today  The patient agreed to participate  Subjective  Mathew Martínez is a 68 y o  male who is concerned about COVID-19  He reports cough  He has  experienced cough He has had contact with a person who is under investigation for or who is positive for COVID-19 within the last 14 days  He has not been hospitalized recently for fever and/or lower respiratory symptoms  Home care worker called to say she tested positive for Covid  Patient and wife were exposed and have mild symptoms  Advised to go to respiratory center or ED and patient refused  Spoke with patient and his grand daughter, Christophe High  Past Medical History:   Diagnosis Date    Arthritis     BPH without urinary obstruction     CKD (chronic kidney disease), stage III (HCC)     baseline 1 6-1 7    GERD (gastroesophageal reflux disease)     Hyperlipidemia     Hypertension     MI (myocardial infarction) ([facility] )        Past Surgical History:   Procedure Laterality Date    APPENDECTOMY      CARDIAC SURGERY      COLONOSCOPY  2017    FRACTURE SURGERY      IR [address] EXCHANGE  1/6/2020    IR [address] PLACEMENT  1/2/2020    IR [address] REMOVAL  3/4/2020    IR TEMP HD CATH  12/23/2019    IR THORACENTESIS  12/24/2019    IR THORACENTESIS  12/27/2019    [place] ASCEND AORTA GRAFT W ROOT REPLACMENT  VALVE CONDUIT/CORON RECONSTRUCT N/A 12/15/2019    Procedure: BENTALL PROCEDURE (ASCENDING AORTIC REPAIR) with 26mm Gelweave Graft;  Surgeon: Lupis Gaspar DO;  [facility];  Service: Cardiac Surgery    [place] RECONSTR TOTAL SHOULDER IMPLANT Right 12/5/2017    Procedure: ARTHROPLASTY SHOULDER REVERSE with OPEN CYST EXCISION;  Surgeon: Tawanna Pedroza MD;  [facility];  Service: Orthopedics    SHOULDER SURGERY      WRIST SURGERY         Current Outpatient Medications   Medication Sig Dispense Refill    acetaminophen (TYLENOL) 325 mg tablet Take 1-2 tabs q6h PRN mild fever/pain (Patient not taking: Reported on 6/1/2020) 90 tablet 0    atorvastatin (LIPITOR) 40 mg tablet Take 1 tablet (40 mg total) by mouth daily 90 tablet 0    bumetanide (BUMEX) 2 mg tablet Take 1 tablet (2 mg total) by mouth daily 30 tablet 5    diphenhydrAMINE (BENADRYL) 25 mg tablet Take 25 mg by mouth every 6 (six) hours as needed for itching      metoprolol tartrate (LOPRESSOR) 25 mg tablet Take 1 tablet (25 mg total) by mouth every 12 (twelve) hours 60 tablet 5    warfarin (COUMADIN) 2 5 mg tablet Take 2 5mg daily unless otherwise directed based on INR  90 tablet 1     No current facility-administered medications for this visit  No Known Allergies    Review of Systems   Respiratory: Positive for cough  Video Exam    There were no vitals filed for this visit  Yonathan Smith appears healthy, alert, no distress  Physical Exam     VIRTUAL VISIT DISCLAIMER    Oliver Sharpe acknowledges that he has consented to an online visit or consultation  He understands that the online visit is based solely on information provided by him, and that, in the absence of a face-to-face physical evaluation by the physician, the diagnosis he receives is both limited and provisional in terms of accuracy and completeness  This is not intended to replace a full medical face-to-face evaluation by the physician  Lynn Sotelo understands and accepts these terms

## 2020-07-08 NOTE — ASSESSMENT & PLAN NOTE
Patient and wife both exposed to Covid via home care worker  Advised to go to respiratory clinic/ED at [facility] and patient refused but will go for testing  Explained that he has multiple risk factors and may require hospitalization  Spoke with patient and grand daughter

## 2020-07-09 LAB
ALBUMIN SERPL ELPH-MCNC: 3.36 G/DL (ref 3.5–5)
ALBUMIN SERPL ELPH-MCNC: 40 % (ref 52–65)
ALPHA1 GLOB SERPL ELPH-MCNC: 0.29 G/DL (ref 0.1–0.4)
ALPHA1 GLOB SERPL ELPH-MCNC: 3.5 % (ref 2.5–5)
ALPHA2 GLOB SERPL ELPH-MCNC: 0.67 G/DL (ref 0.4–1.2)
ALPHA2 GLOB SERPL ELPH-MCNC: 8 % (ref 7–13)
BETA GLOB ABNORMAL SERPL ELPH-MCNC: 0.48 G/DL (ref 0.4–0.8)
BETA1 GLOB SERPL ELPH-MCNC: 5.7 % (ref 5–13)
BETA2 GLOB SERPL ELPH-MCNC: 7.3 % (ref 2–8)
BETA2+GAMMA GLOB SERPL ELPH-MCNC: 0.61 G/DL (ref 0.2–0.5)
CRYOGLOB RF SER-ACNC: ABNORMAL [IU]/ML
GAMMA GLOB ABNORMAL SERPL ELPH-MCNC: 2.98 G/DL (ref 0.5–1.6)
GAMMA GLOB SERPL ELPH-MCNC: 35.5 % (ref 12–22)
IGG/ALB SER: 0.67 {RATIO} (ref 1.1–1.8)
INTERPRETATION UR IFE-IMP: NORMAL
KAPPA LC FREE SER-MCNC: 178 MG/L (ref 3.3–19.4)
KAPPA LC FREE/LAMBDA FREE SER: 1.36 {RATIO} (ref 0.26–1.65)
LAMBDA LC FREE SERPL-MCNC: 131 MG/L (ref 5.7–26.3)
PROT PATTERN SERPL ELPH-IMP: ABNORMAL
PROT SERPL-MCNC: 8.4 G/DL (ref 6.4–8.2)
RHEUMATOID FACT SER QL LA: POSITIVE

## 2020-07-14 ENCOUNTER — TELEPHONE (OUTPATIENT)
Dept: OTHER | Facility: OTHER | Age: 80
End: 2020-07-14

## 2020-07-14 LAB — SARS-COV-2 RNA SPEC QL NAA+PROBE: DETECTED

## 2020-07-14 NOTE — TELEPHONE ENCOUNTER
The patient was called for notification of a POSITIVE test result for COVID-19  The patient did not answer the phone and a voicemail was left requesting a call back to [phone], Option 7

## 2020-07-15 ENCOUNTER — TELEMEDICINE (OUTPATIENT)
Dept: FAMILY MEDICINE CLINIC | Facility: CLINIC | Age: 80
End: 2020-07-15

## 2020-07-15 VITALS — TEMPERATURE: 99.6 F

## 2020-07-15 DIAGNOSIS — U07.1 COVID-19: Primary | ICD-10-CM

## 2020-07-15 PROCEDURE — G2012 BRIEF CHECK IN BY MD/QHP: HCPCS | Performed by: NURSE PRACTITIONER

## 2020-07-15 RX ORDER — AMLODIPINE BESYLATE 5 MG/1
TABLET ORAL
COMMUNITY
Start: 2020-07-15 | End: 2020-08-10

## 2020-07-15 NOTE — PROGRESS NOTES
COVID-19 Virtual Visit     Assessment/Plan:    Problem List Items Addressed This Visit     None        This virtual check-in was done via telephone  Disposition: This was a follow up visit, patient tested + for Covid  I spoke with him on the phone today and he is feeling "good " Denies fever, body aches, cough, or GI symptoms  I spent 5 minutes directly with the patient during this visit  His wife is in the hospital for Covid but she is recovering as per patient  Encounter provider EL Avitia    Provider located at [address] [address]  [phone]    Recent Visits  Date Type Provider Dept   07/08/20 Telemedicine Ángel Zhao 799 recent visits within past 7 days and meeting all other requirements     Today's Visits  Date Type Provider Dept   07/15/20 Telemedicine Sonny Hicks, [address]   Showing today's visits and meeting all other requirements     Future Appointments  No visits were found meeting these conditions  Showing future appointments within next 150 days and meeting all other requirements        Patient agrees to participate in a virtual check in via telephone or video visit instead of presenting to the office to address urgent/immediate medical needs  Patient is aware this is a billable service  After connecting through telephone, the patient was identified by name and date of birth  Fabi Dickson was informed that this was a telemedicine visit and that the exam was being conducted confidentially over secure lines  My office door was closed  No one else was in the room  Oliver Sharpe acknowledged consent and understanding of privacy and security of the telemedicine visit  I informed the patient that I have reviewed his record in Epic and presented the opportunity for him to ask any questions regarding the visit today  The patient agreed to participate  Subjective  Derik Ha is a 68 y o  male who is concerned about COVID-19  He reports no symptioms to report  He has not experienced fever, chills, repeated shaking with chills, cough, shortness of breath, headache, sore throat, anorexia, fatigue, myalgias, anosmia, abdominal pain, diarrhea, nausea and vomiting He has had contact with a person who is under investigation for or who is positive for COVID-19 within the last 14 days  He has not been hospitalized recently for fever and/or lower respiratory symptoms  Past Medical History:   Diagnosis Date    Arthritis     BPH without urinary obstruction     CKD (chronic kidney disease), stage III (HCC)     baseline 1 6-1 7    GERD (gastroesophageal reflux disease)     Hyperlipidemia     Hypertension     MI (myocardial infarction) ([facility] )        Past Surgical History:   Procedure Laterality Date    APPENDECTOMY      CARDIAC SURGERY      COLONOSCOPY  2017    FRACTURE SURGERY      IR [address] EXCHANGE  1/6/2020    IR [address] PLACEMENT  1/2/2020    IR [address] REMOVAL  3/4/2020    IR TEMP HD CATH  12/23/2019    IR THORACENTESIS  12/24/2019    IR THORACENTESIS  12/27/2019    [place] ASCEND AORTA GRAFT W ROOT REPLACMENT  VALVE CONDUIT/CORON RECONSTRUCT N/A 12/15/2019    Procedure: BENTALL PROCEDURE (ASCENDING AORTIC REPAIR) with 26mm Gelweave Graft;  Surgeon: Fabricio Blue DO;  [facility];  Service: Cardiac Surgery    [place] RECONSTR TOTAL SHOULDER IMPLANT Right 12/5/2017    Procedure: ARTHROPLASTY SHOULDER REVERSE with OPEN CYST EXCISION;  Surgeon: Rajani Ruelas MD;  [facility];  Service: Orthopedics    SHOULDER SURGERY      WRIST SURGERY         Current Outpatient Medications   Medication Sig Dispense Refill    acetaminophen (TYLENOL) 325 mg tablet Take 1-2 tabs q6h PRN mild fever/pain 90 tablet 0    amLODIPine (NORVASC) 5 mg tablet       atorvastatin (LIPITOR) 40 mg tablet Take 1 tablet (40 mg total) by mouth daily 90 tablet 0    diphenhydrAMINE (BENADRYL) 25 mg tablet Take 25 mg by mouth every 6 (six) hours as needed for itching      warfarin (COUMADIN) 2 5 mg tablet Take 2 5mg daily unless otherwise directed based on INR  90 tablet 1    bumetanide (BUMEX) 2 mg tablet Take 1 tablet (2 mg total) by mouth daily 30 tablet 5    metoprolol tartrate (LOPRESSOR) 25 mg tablet Take 1 tablet (25 mg total) by mouth every 12 (twelve) hours 60 tablet 5     No current facility-administered medications for this visit  No Known Allergies    Review of Systems    Video Exam    Vitals:    07/15/20 1409   Temp: 99 6 °F (37 6 °C)         Oliver   Physical Exam     VIRTUAL VISIT DISCLAIMER    Oliver Sharpe acknowledges that he has consented to an online visit or consultation  He understands that the online visit is based solely on information provided by him, and that, in the absence of a face-to-face physical evaluation by the physician, the diagnosis he receives is both limited and provisional in terms of accuracy and completeness  This is not intended to replace a full medical face-to-face evaluation by the physician  Adrien May understands and accepts these terms

## 2020-07-27 ENCOUNTER — TELEPHONE (OUTPATIENT)
Dept: FAMILY MEDICINE CLINIC | Facility: CLINIC | Age: 80
End: 2020-07-27

## 2020-07-27 ENCOUNTER — TELEMEDICINE (OUTPATIENT)
Dept: FAMILY MEDICINE CLINIC | Facility: CLINIC | Age: 80
End: 2020-07-27

## 2020-07-27 VITALS — HEIGHT: 67 IN | BODY MASS INDEX: 24.33 KG/M2 | WEIGHT: 155 LBS

## 2020-07-27 DIAGNOSIS — U07.1 COVID-19: ICD-10-CM

## 2020-07-27 DIAGNOSIS — Z20.828 EXPOSURE TO SARS-ASSOCIATED CORONAVIRUS: ICD-10-CM

## 2020-07-27 DIAGNOSIS — Z20.828 EXPOSURE TO SARS-ASSOCIATED CORONAVIRUS: Primary | ICD-10-CM

## 2020-07-27 PROCEDURE — U0003 INFECTIOUS AGENT DETECTION BY NUCLEIC ACID (DNA OR RNA); SEVERE ACUTE RESPIRATORY SYNDROME CORONAVIRUS 2 (SARS-COV-2) (CORONAVIRUS DISEASE [COVID-19]), AMPLIFIED PROBE TECHNIQUE, MAKING USE OF HIGH THROUGHPUT TECHNOLOGIES AS DESCRIBED BY CMS-2020-01-R: HCPCS

## 2020-07-27 PROCEDURE — 99214 OFFICE O/P EST MOD 30 MIN: CPT | Performed by: FAMILY MEDICINE

## 2020-07-27 NOTE — PROGRESS NOTES
COVID-19 Virtual Visit     Assessment/Plan:  Aneta Young is a 68 y o  male with:  Problem List Items Addressed This Visit        Other    COVID-19     Asymptomatic gentleman who tested positive for COVID-19 on 07/08/2020 and on subsequent visit on 07/15/2020 all the symptoms had resolved  He is doing well, asymptomatic today, and has continued all COVID-19 prevention precautions  COVID-19 testing ordered per patient request because he would like to have a negative result for possible essential travel protect his contacts  Discussed with patient uncertain insurance coverage in this particular situation, and unreliable results if he is asymptomatic  Patient expresses understanding and would like to proceed  Patient counseled  Testing ordered  Will call patient with results           Other Visit Diagnoses     Exposure to SARS-associated coronavirus    -  Primary    Relevant Orders    MISC COVID-19 TEST- Collected at [facility] or [facility]        This virtual check-in was done via [employer]  Disposition:      I referred [facility] to one of our centralized sites for a COVID-19 swab    I spent 20 minutes directly with the patient during this visit    Encounter provider Hans Hendrickson MD    Provider located at [address] [place] [address]   [phone]    Recent Visits  No visits were found meeting these conditions     Showing recent visits within past 7 days and meeting all other requirements     Today's Visits  Date Type Provider Dept   07/27/20 MD Bereket Armas   07/27/20 Telephone MD Bereket Savage   Showing today's visits and meeting all other requirements     Future Appointments  Date Type Provider Dept   07/27/20 MD Bereket Armas   Showing future appointments within next 150 days and meeting all other requirements        Patient agrees to participate in a virtual check in via telephone or video visit instead of presenting to the office to address urgent/immediate medical needs  Patient is aware this is a billable service  After connecting through [employer], the patient was identified by name and date of birth  Xander Mejia was informed that this was a telemedicine visit and that the exam was being conducted confidentially over secure lines  My office door was closed  No one else was in the room  Oliver Sharpe acknowledged consent and understanding of privacy and security of the telemedicine visit  I informed the patient that I have reviewed his record in Epic and presented the opportunity for him to ask any questions regarding the visit today  The patient agreed to participate  Subjective  Xander Mejia is a 68 y o  male who is concerned about COVID-19  Recently positive COVID-19  Recovered with complete resolution of symptoms by 07/15/2020  Denies any fevers, chills, ENT/cardiopulmonary/GI/ complaints today  Requesting COVID-19 testing for possible is social travel  He reports no symptoms, requires screening  He has not experienced no symptoms, requires screening He has had contact with a person who is under investigation for or who is positive for COVID-19 within the last 14 days  He has not been hospitalized recently for fever and/or lower respiratory symptoms      Past Medical History:   Diagnosis Date    Arthritis     BPH without urinary obstruction     CKD (chronic kidney disease), stage III (HCC)     baseline 1 6-1 7    GERD (gastroesophageal reflux disease)     Hyperlipidemia     Hypertension     MI [facility] infarction[facility])        Past Surgical History:   Procedure Laterality Date    APPENDECTOMY      CARDIAC SURGERY      COLONOSCOPY  2017    FRACTURE SURGERY      IR [facility] EXCHANGE  1/6/2020    [facility] PLACEMENT  1/2/2020    IR [facility] REMOVAL  3/4/2020    IR TEMP HD CATH  12/23/2019    IR THORACENTESIS 12/24/2019    IR THORACENTESIS  12/27/2019    RI ASCEND AORTA GRAFT W ROOT REPLACMENT  VALVE CONDUIT/CORON RECONSTRUCT N/A 12/15/2019    Procedure: BENTALL PROCEDURE (ASCENDING AORTIC REPAIR) with 26mm Gelweave Graft;  Surgeon: Ean Bowen DO;  [facility];  Service: Cardiac Surgery    RI RECONSTR TOTAL SHOULDER IMPLANT Right 12/5/2017    Procedure: ARTHROPLASTY SHOULDER REVERSE with OPEN CYST EXCISION;  Surgeon: Matt Metz MD;  [facility];  Service: Orthopedics    SHOULDER SURGERY      WRIST SURGERY         Current Outpatient Medications   Medication Sig Dispense Refill    acetaminophen (TYLENOL) 325 mg tablet Take 1-2 tabs q6h PRN mild fever/pain 90 tablet 0    amLODIPine (NORVASC) 5 mg tablet       atorvastatin (LIPITOR) 40 mg tablet Take 1 tablet (40 mg total) by mouth daily 90 tablet 0    diphenhydrAMINE (BENADRYL) 25 mg tablet Take 25 mg by mouth every 6 (six) hours as needed for itching      warfarin (COUMADIN) 2 5 mg tablet Take 2 5mg daily unless otherwise directed based on INR  90 tablet 1    bumetanide (BUMEX) 2 mg tablet Take 1 tablet (2 mg total) by mouth daily 30 tablet 5    metoprolol tartrate (LOPRESSOR) 25 mg tablet Take 1 tablet (25 mg total) by mouth every 12 (twelve) hours 60 tablet 5     No current facility-administered medications for this visit  No Known Allergies    Review of Systems  As per HPI  Video Exam      Oliver appears healthy, alert, no distress, cooperative, smiling  Physical Exam     VIRTUAL VISIT DISCLAIMER    Oliver Sharpe acknowledges that he has consented to an online visit or consultation  He understands that the online visit is based solely on information provided by him, and that, in the absence of a face-to-face physical evaluation by the physician, the diagnosis he receives is both limited and provisional in terms of accuracy and completeness   This is not intended to replace a full medical face-to-face evaluation by the physician Harsha Bronson understands and accepts these terms

## 2020-07-27 NOTE — ASSESSMENT & PLAN NOTE
His symptomatic gentleman who tested positive for COVID-19 on 07/08/2020 had on subsequent visit on 07/15/2020 all the symptoms had resolved  He is doing well, and has continued all COVID-19 prevention precautions  COVID-19 testing ordered per patient request because he would like to have a negative result so as he is in contact with the other individuals home he would like to protect  Discussed with patient uncertain insurance coverage in this particular situation, and unreliable results if he is asymptomatic  Patient expresses understanding of would like to proceed  Patient counseled  Testing ordered    Will call patient with results

## 2020-07-28 LAB — SARS-COV-2 RNA SPEC QL NAA+PROBE: NOT DETECTED

## 2020-08-03 ENCOUNTER — TELEPHONE (OUTPATIENT)
Dept: CARDIOLOGY CLINIC | Facility: CLINIC | Age: 80
End: 2020-08-03

## 2020-08-03 DIAGNOSIS — I10 HYPERTENSION, UNSPECIFIED TYPE: ICD-10-CM

## 2020-08-03 DIAGNOSIS — I48.0 PAROXYSMAL ATRIAL FIBRILLATION (HCC): Primary | ICD-10-CM

## 2020-08-03 NOTE — TELEPHONE ENCOUNTER
Pt last seen on 6/24  Granddaughter called today, reports pt's feet are very edematous bilaterally  His scale at home is broken, so weight is not known  Not sob, no abdominal distention  Granddaughter said he is still taking Bumex 2 mg daily which was prescribed by Nephrology in May after hospitalization  He has nephrology appt scheduled on 8/11 with Dr Eugene Kelly  Last BMP in chart from 7/8  Creatinine 1 86   K 3 6  Cathie Galicia has available appts this week, or nephrology to address? Please advise

## 2020-08-04 ENCOUNTER — ANTICOAG VISIT (OUTPATIENT)
Dept: CARDIOLOGY CLINIC | Facility: CLINIC | Age: 80
End: 2020-08-04

## 2020-08-04 ENCOUNTER — APPOINTMENT (OUTPATIENT)
Dept: LAB | Facility: HOSPITAL | Age: 80
End: 2020-08-04
Attending: INTERNAL MEDICINE
Payer: COMMERCIAL

## 2020-08-04 DIAGNOSIS — Z51.81 ANTICOAGULATION GOAL OF INR 2 TO 3: ICD-10-CM

## 2020-08-04 DIAGNOSIS — Z79.01 ANTICOAGULATION GOAL OF INR 2 TO 3: ICD-10-CM

## 2020-08-04 LAB
ANION GAP SERPL CALCULATED.3IONS-SCNC: 3 MMOL/L (ref 4–13)
BUN SERPL-MCNC: 46 MG/DL (ref 5–25)
CALCIUM SERPL-MCNC: 8.5 MG/DL (ref 8.3–10.1)
CHLORIDE SERPL-SCNC: 112 MMOL/L (ref 100–108)
CO2 SERPL-SCNC: 29 MMOL/L (ref 21–32)
CREAT SERPL-MCNC: 2.28 MG/DL (ref 0.6–1.3)
GFR SERPL CREATININE-BSD FRML MDRD: 26 ML/MIN/1.73SQ M
GLUCOSE SERPL-MCNC: 112 MG/DL (ref 65–140)
POTASSIUM SERPL-SCNC: 3.8 MMOL/L (ref 3.5–5.3)
SODIUM SERPL-SCNC: 144 MMOL/L (ref 136–145)

## 2020-08-04 PROCEDURE — 80048 BASIC METABOLIC PNL TOTAL CA: CPT

## 2020-08-07 ENCOUNTER — TELEMEDICINE (OUTPATIENT)
Dept: FAMILY MEDICINE CLINIC | Facility: CLINIC | Age: 80
End: 2020-08-07

## 2020-08-07 DIAGNOSIS — M54.9 CHRONIC MIDLINE BACK PAIN, UNSPECIFIED BACK LOCATION: Primary | ICD-10-CM

## 2020-08-07 DIAGNOSIS — G89.29 CHRONIC MIDLINE BACK PAIN, UNSPECIFIED BACK LOCATION: Primary | ICD-10-CM

## 2020-08-07 PROCEDURE — 1160F RVW MEDS BY RX/DR IN RCRD: CPT | Performed by: FAMILY MEDICINE

## 2020-08-07 PROCEDURE — 99214 OFFICE O/P EST MOD 30 MIN: CPT | Performed by: FAMILY MEDICINE

## 2020-08-07 PROCEDURE — 1036F TOBACCO NON-USER: CPT | Performed by: FAMILY MEDICINE

## 2020-08-07 NOTE — ASSESSMENT & PLAN NOTE
Patient reports pain usually well controlled with tylenol but for the past 3 weeks pain wakes him from sleep and he has to take 2-3 tylenol 500 mg at night to be able to sleep   -He denies fever, bowel or urinary incontinence  -Due to patient recent hx of type A aortic dissection recommended that if pain becomes unbearable during the night to go to ED immediately   Otherwise he will need to be evaluated in the office as soon as possible    -Will send message to  staff to have close follow up appointment

## 2020-08-07 NOTE — PROGRESS NOTES
Virtual Brief Visit    Assessment/Plan:    Problem List Items Addressed This Visit        Other    Chronic midline back pain - Primary     Patient reports pain usually well controlled with tylenol but for the past 3 weeks pain wakes him from sleep and he has to take 2-3 tylenol 500 mg at night to be able to sleep   -He denies fever, bowel or urinary incontinence  -Due to patient recent hx of type A aortic dissection recommended that if pain becomes unbearable during the night to go to ED immediately  Otherwise he will need to be evaluated in the office as soon as possible    -Will send message to  staff to have close follow up appointment                      Reason for visit is   Chief Complaint   Patient presents with    Virtual Brief Visit        Encounter provider Stormy Raymond MD    Provider located at [facility] [id number] [facility] [address] [facility]   [phone]    Recent Visits  No visits were found meeting these conditions  Showing recent visits within past 7 days and meeting all other requirements     Today's Visits  Date Type Provider Dept   08/07/20 Suhas Ospina MD  [facility]   Showing today's visits and meeting all other requirements     Future Appointments  No visits were found meeting these conditions  Showing future appointments within next 150 days and meeting all other requirements        After connecting through telephone, the patient was identified by name and date of birth  Marie Graham was informed that this is a telemedicine visit and that the visit is being conducted through telephone  My office door was closed  No one else was in the room  He acknowledged consent and understanding of privacy and security of the platform  The patient has agreed to participate and understands he can discontinue the visit at any time  Patient is aware this is a billable service       Sue SEBASTIAN Core is a 68 y o  male evaluated today for back pain  HPI     Patient reports lower back pain  He used to take tylenol which improved pain but for the last 3 weeks it has been worsening  This pain is sudden, specially at night, mildly improved with tylenol, pressure like, non radiating  He also reports bilateral leg swelling  He denies fever, dysuria, hematuria, frequency  Patient has hx of type A aortic dissection on 12/19  Since then he has been following cardiology and nephrology  He also states legs are becoming more swollen  He is currently on Bumex 2 mg  BMP on 8/04 showed Cr  2 28 (previous 1 86)  He has follow up appointment with  Dr Yordy Monaco, nephrology on 8/11  Past Medical History:   Diagnosis Date    Arthritis     BPH without urinary obstruction     CKD (chronic kidney disease), stage III (HCC)     baseline 1 6-1 7    GERD (gastroesophageal reflux disease)     Hyperlipidemia     Hypertension     MI (myocardial infarction) ([facility] [address] )        Past Surgical History:   Procedure Laterality Date    APPENDECTOMY      CARDIAC SURGERY      COLONOSCOPY  2017    FRACTURE SURGERY      IR [address] [facility] EXCHANGE  1/6/2020    IR [address] [facility] PLACEMENT  1/2/2020    IR [address] [facility] REMOVAL  3/4/2020    IR TEMP HD CATH  12/23/2019    IR THORACENTESIS  12/24/2019    IR THORACENTESIS  12/27/2019    SC ASCEND AORTA GRAFT W ROOT REPLACMENT  VALVE CONDUIT/CORON RECONSTRUCT N/A 12/15/2019    Procedure: BENTALL PROCEDURE (ASCENDING AORTIC REPAIR) with 26mm Gelweave Graft;  Surgeon: Elmo Silva DO;  [facility];  Service: Cardiac Surgery    SC RECONSTR TOTAL SHOULDER IMPLANT Right 12/5/2017    Procedure: ARTHROPLASTY SHOULDER REVERSE with OPEN CYST EXCISION;  Surgeon: Tena Ivy MD;  [facility];  Service: Orthopedics    SHOULDER SURGERY      WRIST SURGERY         Current Outpatient Medications   Medication Sig Dispense Refill    acetaminophen (TYLENOL) 325 mg tablet Take 1-2 tabs q6h PRN mild fever/pain 90 tablet 0    amLODIPine (NORVASC) 5 mg tablet       atorvastatin (LIPITOR) 40 mg tablet Take 1 tablet (40 mg total) by mouth daily 90 tablet 0    bumetanide (BUMEX) 2 mg tablet Take 1 tablet (2 mg total) by mouth daily 30 tablet 5    diphenhydrAMINE (BENADRYL) 25 mg tablet Take 25 mg by mouth every 6 (six) hours as needed for itching      metoprolol tartrate (LOPRESSOR) 25 mg tablet Take 1 tablet (25 mg total) by mouth every 12 (twelve) hours 60 tablet 5    warfarin (COUMADIN) 2 5 mg tablet Take 2 5mg daily unless otherwise directed based on INR  90 tablet 1     No current facility-administered medications for this visit  No Known Allergies    Review of Systems   Constitutional: Negative for fever  Cardiovascular: Positive for leg swelling  Gastrointestinal: Negative for abdominal pain, diarrhea, nausea and vomiting  Genitourinary: Negative for dysuria, frequency, hematuria and urgency  Musculoskeletal: Positive for back pain  Negative for neck pain  Neurological: Negative for numbness  Psychiatric/Behavioral: The patient is not nervous/anxious  There were no vitals filed for this visit  I spent 15 minutes directly with the patient during this visit    [address] acknowledges that he has consented to an online visit or consultation  He understands that the online visit is based solely on information provided by him, and that, in the absence of a face-to-face physical evaluation by the physician, the diagnosis he receives is both limited and provisional in terms of accuracy and completeness  This is not intended to replace a full medical face-to-face evaluation by the physician  Sofi Mcmullen understands and accepts these terms

## 2020-08-10 ENCOUNTER — OFFICE VISIT (OUTPATIENT)
Dept: FAMILY MEDICINE CLINIC | Facility: CLINIC | Age: 80
End: 2020-08-10

## 2020-08-10 VITALS
SYSTOLIC BLOOD PRESSURE: 138 MMHG | BODY MASS INDEX: 28.75 KG/M2 | WEIGHT: 183.2 LBS | HEART RATE: 78 BPM | DIASTOLIC BLOOD PRESSURE: 70 MMHG | TEMPERATURE: 97.3 F | HEIGHT: 67 IN | RESPIRATION RATE: 14 BRPM

## 2020-08-10 DIAGNOSIS — M54.50 LUMBAR PAIN: Primary | ICD-10-CM

## 2020-08-10 DIAGNOSIS — I12.9 BENIGN HYPERTENSION WITH CKD (CHRONIC KIDNEY DISEASE) STAGE III (HCC): Chronic | ICD-10-CM

## 2020-08-10 DIAGNOSIS — N18.30 BENIGN HYPERTENSION WITH CKD (CHRONIC KIDNEY DISEASE) STAGE III (HCC): Chronic | ICD-10-CM

## 2020-08-10 PROBLEM — I48.0 PAROXYSMAL ATRIAL FIBRILLATION (HCC): Status: RESOLVED | Noted: 2020-04-17 | Resolved: 2020-08-10

## 2020-08-10 PROCEDURE — 99213 OFFICE O/P EST LOW 20 MIN: CPT | Performed by: FAMILY MEDICINE

## 2020-08-10 PROCEDURE — 3075F SYST BP GE 130 - 139MM HG: CPT | Performed by: FAMILY MEDICINE

## 2020-08-10 PROCEDURE — 4040F PNEUMOC VAC/ADMIN/RCVD: CPT | Performed by: FAMILY MEDICINE

## 2020-08-10 PROCEDURE — 1036F TOBACCO NON-USER: CPT | Performed by: FAMILY MEDICINE

## 2020-08-10 PROCEDURE — 3008F BODY MASS INDEX DOCD: CPT | Performed by: FAMILY MEDICINE

## 2020-08-10 PROCEDURE — 3078F DIAST BP <80 MM HG: CPT | Performed by: FAMILY MEDICINE

## 2020-08-10 PROCEDURE — 1160F RVW MEDS BY RX/DR IN RCRD: CPT | Performed by: FAMILY MEDICINE

## 2020-08-10 NOTE — PROGRESS NOTES
Assessment/Plan:    Benign hypertension with CKD (chronic kidney disease) stage III (HCC)  Blood Pressure: 138/70   Well controlled  At goal   -Patient currently on Lopressor 25 mg BID  Patient states he is also taking amlodipine mg daily but per chart review Dr Seth Sandy, nephrology discontinued on previous visit due to worsening LE edema  -Continue Bumetanide 2 mg daily  On previous BMP Cr rise 2 28 from 1 86  Follow up appointment with Dr Seth Sandy, nephrology tomorrow    -Patient with wheezes and LE without JVD  Continue monitor signs of fluid overload closely  ED precautions discussed with patient and family  -Discontinued Amlodipine on chart and had discussion with patient, wife and daughter to stop medication   -Continue encouraging DASH diet  Lumbar pain  Acute on chronic  -Patient reports he lower back pain improved with tylenol but for the past 3 weeks pain has been waking him up from sleep   -Patient denies fever, urinary/bowel incontinence, radiation   -Will order lumbar X-ray at this time to rule out malignancy  Follow up results closely  -Follow up appointment in 3 months         Problem List Items Addressed This Visit        Cardiovascular and Mediastinum    Benign hypertension with CKD (chronic kidney disease) stage III (HCC) (Chronic)     Blood Pressure: 138/70   Well controlled  At goal   -Patient currently on Lopressor 25 mg BID  Patient states he is also taking amlodipine mg daily but per chart review Dr Seth Sandy, nephrology discontinued on previous visit due to worsening LE edema  -Continue Bumetanide 2 mg daily  On previous BMP Cr rise 2 28 from 1 86  Follow up appointment with Dr Seth Sandy nephrology tomorrow    -Patient with wheezes and LE without JVD  Continue monitor signs of fluid overload closely  ED precautions discussed with patient and family  -Discontinued Amlodipine on chart and had discussion with patient, wife and daughter to stop medication   -Continue encouraging DASH diet  Other    Lumbar pain - Primary     Acute on chronic  -Patient reports he lower back pain improved with tylenol but for the past 3 weeks pain has been waking him up from sleep   -Patient denies fever, urinary/bowel incontinence, radiation   -Will order lumbar X-ray at this time to rule out malignancy  Follow up results closely  -Follow up appointment in 3 months         Relevant Orders    XR spine lumbar minimum 4 views non injury            Subjective:      Patient ID: Ava Singh is a 68 y o  male  HPI     Patient is a 69 yo M who presents to the clinic to follow up back pain and bilateral LE swelling  Patient reports legs are becoming more swollen lately  He states recently Dr Jareth Duval, nephrology discontinued Amlodipine due to side effects of swelling  He was unsure of what medication for BP was supposed to discontinued so he took everything as previously prescribed  He also reports back pain has become more bothersome for the past 3 weeks  He states it wakes him up at night/ Pain is worse when standing straight and less when bending forward  He denies urinary/bowel incontinence, fever, radiation, weakness, numbness  He has been taking tylenol without improvement of pain  The following portions of the patient's history were reviewed and updated as appropriate:   He  has a past medical history of Arthritis, BPH without urinary obstruction, CKD (chronic kidney disease), stage III ([address] ), GERD (gastroesophageal reflux disease), Hyperlipidemia, Hypertension, and MI (myocardial infarction) ([address] )  He  has a past surgical history that includes Appendectomy; pr reconstr total shoulder implant (Right, 12/5/2017); Fracture surgery; Colonoscopy (2017); Shoulder surgery; Wrist surgery; pr ascend aorta graft w root replacment  valve conduit/coron reconstruct (N/A, 12/15/2019); IR Temp HD cath (12/23/2019); IR thoracentesis (12/24/2019); IR thoracentesis (12/27/2019); IR permacath placement (1/2/2020);  IR permacath exchange (1/6/2020); IR permacath removal (3/4/2020); and Cardiac surgery  He  reports that he quit smoking about 40 years ago  He smoked 1 00 pack per day  He has never used smokeless tobacco  He reports previous alcohol use  He reports previous drug use  Current Outpatient Medications   Medication Sig Dispense Refill    atorvastatin (LIPITOR) 40 mg tablet Take 1 tablet (40 mg total) by mouth daily 90 tablet 0    bumetanide (BUMEX) 2 mg tablet Take 1 tablet (2 mg total) by mouth daily 30 tablet 5    diphenhydrAMINE (BENADRYL) 25 mg tablet Take 25 mg by mouth every 6 (six) hours as needed for itching      metoprolol tartrate (LOPRESSOR) 25 mg tablet Take 1 tablet (25 mg total) by mouth every 12 (twelve) hours 60 tablet 5    warfarin (COUMADIN) 2 5 mg tablet Take 2 5mg daily unless otherwise directed based on INR  90 tablet 1    acetaminophen (TYLENOL) 325 mg tablet Take 1-2 tabs q6h PRN mild fever/pain 90 tablet 0     No current facility-administered medications for this visit  He has No Known Allergies       Review of Systems   Constitutional: Negative for fatigue and fever  Respiratory: Negative for cough  Cardiovascular: Positive for leg swelling  Negative for chest pain and palpitations  Musculoskeletal: Positive for back pain  Neurological: Negative for numbness and headaches  Psychiatric/Behavioral: The patient is not nervous/anxious  Objective:      /70 (BP Location: Left arm, Patient Position: Sitting, Cuff Size: Large)   Pulse 78   Temp (!) 97 3 °F (36 3 °C) (Tympanic)   Resp 14   Ht 5' 7" (1 702 m)   Wt 83 1 kg (183 lb 3 2 oz)   BMI 28 69 kg/m²          Physical Exam  Vitals signs reviewed  Constitutional:       General: He is not in acute distress  Appearance: He is well-developed  He is not diaphoretic  HENT:      Head: Normocephalic and atraumatic  Nose: Nose normal    Neck:      Musculoskeletal: Normal range of motion and neck supple  Thyroid: No thyromegaly  Cardiovascular:      Rate and Rhythm: Normal rate and regular rhythm  Heart sounds: Normal heart sounds  No murmur  No friction rub  No gallop  Pulmonary:      Effort: Pulmonary effort is normal  No respiratory distress  Breath sounds: Rales present  No wheezing  Chest:      Chest wall: No tenderness  Abdominal:      General: Bowel sounds are normal  There is no distension  Palpations: Abdomen is soft  There is no mass  Tenderness: There is no abdominal tenderness  There is no guarding or rebound  Musculoskeletal: Normal range of motion  General: No tenderness or deformity  Right lower leg: Edema present  Left lower leg: Edema present  Lymphadenopathy:      Cervical: No cervical adenopathy  Skin:     General: Skin is warm and dry  Capillary Refill: Capillary refill takes less than 2 seconds  Findings: No erythema or rash  Neurological:      Mental Status: He is alert and oriented to person, place, and time  Psychiatric:         Behavior: Behavior normal          Thought Content:  Thought content normal          Judgment: Judgment normal

## 2020-08-10 NOTE — ASSESSMENT & PLAN NOTE
Blood Pressure: 138/70   Well controlled  At goal   -Patient currently on Lopressor 25 mg BID  Patient states he is also taking amlodipine mg daily but per chart review Dr Archie Rucker, nephrology discontinued on previous visit due to worsening LE edema  -Continue Bumetanide 2 mg daily  On previous BMP Cr rise 2 28 from 1 86  Follow up appointment with Dr Archie Rucker, nephrology tomorrow    -Patient with wheezes and LE without JVD  Continue monitor signs of fluid overload closely  ED precautions discussed with patient and family  -Discontinued Amlodipine on chart and had discussion with patient, wife and daughter to stop medication   -Continue encouraging DASH diet

## 2020-08-10 NOTE — ASSESSMENT & PLAN NOTE
Acute on chronic  -Patient reports he lower back pain improved with tylenol but for the past 3 weeks pain has been waking him up from sleep   -Patient denies fever, urinary/bowel incontinence, radiation   -Will order lumbar X-ray at this time to rule out malignancy   Follow up results closely  -Follow up appointment in 3 months

## 2020-08-11 ENCOUNTER — TRANSCRIBE ORDERS (OUTPATIENT)
Dept: RADIOLOGY | Facility: HOSPITAL | Age: 80
End: 2020-08-11

## 2020-08-11 ENCOUNTER — OFFICE VISIT (OUTPATIENT)
Dept: NEPHROLOGY | Facility: CLINIC | Age: 80
End: 2020-08-11
Payer: COMMERCIAL

## 2020-08-11 ENCOUNTER — HOSPITAL ENCOUNTER (OUTPATIENT)
Dept: RADIOLOGY | Facility: HOSPITAL | Age: 80
Discharge: HOME | End: 2020-08-11
Payer: COMMERCIAL

## 2020-08-11 VITALS
WEIGHT: 173.6 LBS | TEMPERATURE: 97.6 F | HEIGHT: 67 IN | DIASTOLIC BLOOD PRESSURE: 76 MMHG | BODY MASS INDEX: 27.25 KG/M2 | SYSTOLIC BLOOD PRESSURE: 121 MMHG | HEART RATE: 74 BPM

## 2020-08-11 DIAGNOSIS — I71.01 DISSECTION OF THORACIC AORTA (HCC): ICD-10-CM

## 2020-08-11 DIAGNOSIS — I10 BENIGN ESSENTIAL HYPERTENSION: Chronic | ICD-10-CM

## 2020-08-11 DIAGNOSIS — Z98.890 S/P AORTA REPAIR: ICD-10-CM

## 2020-08-11 DIAGNOSIS — M19.211 SECONDARY OSTEOARTHRITIS OF RIGHT SHOULDER: ICD-10-CM

## 2020-08-11 DIAGNOSIS — E78.5 HYPERLIPIDEMIA, UNSPECIFIED HYPERLIPIDEMIA TYPE: ICD-10-CM

## 2020-08-11 DIAGNOSIS — I12.9 BENIGN HYPERTENSION WITH CKD (CHRONIC KIDNEY DISEASE) STAGE IV (HCC): Primary | ICD-10-CM

## 2020-08-11 DIAGNOSIS — N40.0 BENIGN PROSTATIC HYPERPLASIA WITHOUT LOWER URINARY TRACT SYMPTOMS: ICD-10-CM

## 2020-08-11 DIAGNOSIS — N18.4 BENIGN HYPERTENSION WITH CKD (CHRONIC KIDNEY DISEASE) STAGE IV (HCC): Primary | ICD-10-CM

## 2020-08-11 DIAGNOSIS — M54.50 LUMBAR PAIN: ICD-10-CM

## 2020-08-11 PROBLEM — N17.9 AKI (ACUTE KIDNEY INJURY) (HCC): Status: RESOLVED | Noted: 2019-12-30 | Resolved: 2020-08-11

## 2020-08-11 PROCEDURE — 3008F BODY MASS INDEX DOCD: CPT | Performed by: INTERNAL MEDICINE

## 2020-08-11 PROCEDURE — 3075F SYST BP GE 130 - 139MM HG: CPT | Performed by: INTERNAL MEDICINE

## 2020-08-11 PROCEDURE — 99214 OFFICE O/P EST MOD 30 MIN: CPT | Performed by: INTERNAL MEDICINE

## 2020-08-11 PROCEDURE — 3078F DIAST BP <80 MM HG: CPT | Performed by: INTERNAL MEDICINE

## 2020-08-11 PROCEDURE — 1036F TOBACCO NON-USER: CPT | Performed by: INTERNAL MEDICINE

## 2020-08-11 PROCEDURE — 4040F PNEUMOC VAC/ADMIN/RCVD: CPT | Performed by: INTERNAL MEDICINE

## 2020-08-11 PROCEDURE — 72110 X-RAY EXAM L-2 SPINE 4/>VWS: CPT

## 2020-08-11 PROCEDURE — 1160F RVW MEDS BY RX/DR IN RCRD: CPT | Performed by: INTERNAL MEDICINE

## 2020-08-11 NOTE — PATIENT INSTRUCTIONS
Do not take amlodipine (Norvasc)  Follow low-salt diet less than 2 g of salt in a day  Try to keep your legs elevated as much as possible  Please contact my office your lower extremity edema does not improve for the next 3-5 days  I would like to see you back in the office in 4 months with repeat blood test   Do not take NSAIDs (no ibuprofen, Motrin, Advil, Aleve, naproxen)    Okay to take Tylenol or paracetamol or acetaminophen as needed for pain

## 2020-08-11 NOTE — PROGRESS NOTES
OFFICE FOLLOW UP - Nephrology   [facility] A Core 68 y o  male MRN: [id number]       ASSESSMENT and PLAN:  [facility] was seen today for follow-up and chronic kidney disease  Diagnoses and all orders for this visit:    Benign hypertension with CKD (chronic kidney disease) stage IV (HCC)  -     CBC; Future  -     PTH, intact; Future  -     Renal function panel; Future  -     Protein / creatinine ratio, urine; Future    Dissection of thoracic aorta (HCC)    Benign essential hypertension    Secondary osteoarthritis of right shoulder    S/P aorta repair    Hyperlipidemia, unspecified hyperlipidemia type    Benign prostatic hyperplasia without lower urinary tract symptoms        This is a 43-year-old gentleman with known history of chronic kidney disease stage 3/4 with previous previous creatinine around 1 4-1 8, hospitalized in 12/2019 with 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 on 12/15, postoperative course complicated with acute kidney injury suspected secondary to contrast induced nephropathy/postoperative ATN  Patient was started on dialysis on 12/23/2019, renal function improved, dialysis was stopped on 02/20/2020, last time seen in our office was on 05/20 the telemedicine appointment, today returns to the office for follow-up       1  Chronic kidney disease stage 4, creatinine lately in the high 1s to low 2  Prior episode of acute kidney injury requiring dialysis between 12/19 to 02/20  Discussed with patient with his daughter over the phone regarding his current kidney disease  Avoid NSAIDs  Follow low-salt diet  I would like to see him back in the office in 4 months with repeat blood test    2  Hypertension, blood pressure acceptable on metoprolol 25 mg twice a day, amlodipine was discontinued before due to lower extremity edema but for some unknown reason patient was taking it until yesterday  Instructed to do not take amlodipine again    If lower extremity edema does not improve will consider increase diuretics to twice a day    3  Lower extremity edema, amlodipine was discontinued, follow low-salt diet, advised to keep legs elevated, if edema does not improve will consider increase Bumex to 2 mg twice a day   4  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 status post replacement of ascending aortic with jordy arch reconstruction and aortic valve resuspension with at 26 mm Dacron graft on 12/15/2019  Continue follow-up with cardiac surgery  5  Anemia, last hemoglobin 11 1    6  Mineral bone disease, most recent PTH and phosphorus acceptable  7  Hyperlipidemia, on statins      Patient Instructions   Do not take amlodipine (Norvasc)  Follow low-salt diet less than 2 g of salt in a day  Try to keep your legs elevated as much as possible  Please contact my office your lower extremity edema does not improve for the next 3-5 days  I would like to see you back in the office in 4 months with repeat blood test   Do not take NSAIDs (no ibuprofen, Motrin, Advil, Aleve, naproxen)  Okay to take Tylenol or paracetamol or acetaminophen as needed for pain          HPI: Manoj Akhtar is a 68 y o  male who is here for Follow-up and Chronic Kidney Disease    This is a patient with known history of chronic kidney disease previous creatinine 1 4-1 8, hypertension, hospitalized on 12/2019 at [facility] after presenting with acute onset chest pain, CTA showed an ascending aortic dissection with intramural hematoma, patient underwent emergent ascending aortic/jordy arch replacement on 12/15  Postoperative course complicated with acute kidney injury suspected secondary to contrast nephropathy/of postoperative ATN, patient was started on dialysis on 12/23/2018  His renal function improved and dialysis was stopped on February 2020  Last time patient was seen was on May via telemedicine appointment    Today he returns to the office for follow-up    Today presents to the office for evaluation alone  I have discussed with patient's daughter Becca Monroy over the phone and update her about current situation  In general feeling better, denies significant chest pain or shortness of breath, continue with lower extremity edema, in the past he was advised to take Bumex twice a day but his currently taking only once a day for over the last 2 weeks  Denies any abdominal pain, no nausea, vomiting, no diarrhea or constipation  Denies any urinary problems, no burning sensation or gross hematuria  The last blood work was done on 08/04/2020, which we have reviewed together  ROS: All the systems were reviewed and were negative except as documented on the H&P  Allergies: Patient has no known allergies      Medications:   Current Outpatient Medications:     acetaminophen (TYLENOL) 325 mg tablet, Take 1-2 tabs q6h PRN mild fever/pain, Disp: 90 tablet, Rfl: 0    atorvastatin (LIPITOR) 40 mg tablet, Take 1 tablet (40 mg total) by mouth daily, Disp: 90 tablet, Rfl: 0    bumetanide (BUMEX) 2 mg tablet, Take 1 tablet (2 mg total) by mouth daily, Disp: 30 tablet, Rfl: 5    diphenhydrAMINE (BENADRYL) 25 mg tablet, Take 25 mg by mouth every 6 (six) hours as needed for itching, Disp: , Rfl:     metoprolol tartrate (LOPRESSOR) 25 mg tablet, Take 1 tablet (25 mg total) by mouth every 12 (twelve) hours, Disp: 60 tablet, Rfl: 5    warfarin (COUMADIN) 2 5 mg tablet, Take 2 5mg daily unless otherwise directed based on INR , Disp: 90 tablet, Rfl: 1    Past Medical History:   Diagnosis Date    Arthritis     BPH without urinary obstruction     CKD (chronic kidney disease), stage III (HCC)     baseline 1 6-1 7    GERD (gastroesophageal reflux disease)     Hyperlipidemia     Hypertension     MI (myocardial infarction) ([facility] [address] )      Past Surgical History:   Procedure Laterality Date    APPENDECTOMY      CARDIAC SURGERY      COLONOSCOPY  2017    FRACTURE SURGERY      [facility]    IR [address] PLACEMENT  1/2/2020    IR [address] REMOVAL  3/4/2020    IR TEMP HD CATH  12/23/2019    IR THORACENTESIS  12/24/2019    IR THORACENTESIS  12/27/2019    NM ASCEND AORTA GRAFT W ROOT REPLACMENT  VALVE CONDUIT/CORON RECONSTRUCT N/A 12/15/2019    Procedure: BENTALL PROCEDURE (ASCENDING AORTIC REPAIR) with 26mm Gelweave Graft;  Surgeon: Cesar Acosta DO;  [facility];  Service: Cardiac Surgery    NM RECONSTR TOTAL SHOULDER IMPLANT Right 12/5/2017    Procedure: ARTHROPLASTY SHOULDER REVERSE with OPEN CYST EXCISION;  Surgeon: Gallito Monk MD;  [facility];  Service: Orthopedics    SHOULDER SURGERY      WRIST SURGERY       Family History   Problem Relation Age of Onset    No Known Problems Mother     Hypertension Father     Arthritis Family       reports that he quit smoking about 40 years ago  He smoked 1 00 pack per day  He has never used smokeless tobacco  He reports previous alcohol use  He reports previous drug use  Physical Exam:   Vitals:    08/11/20 1321   BP: 121/76   BP Location: Left arm   Patient Position: Sitting   Cuff Size: Standard   Pulse: 74   Temp: 97 6 °F (36 4 °C)   TempSrc: Oral   Weight: 78 7 kg (173 lb 9 6 oz)   Height: 5' 7" (1 702 m)     Body mass index is 27 19 kg/m²      General: conscious, cooperative, in not acute distress  Eyes: conjunctivae pink, anicteric sclerae  ENT: lips and mucous membranes moist  Neck: supple, no JVD  Chest: clear breath sounds bilateral, no crackles, ronchus or wheezings  CVS: distinct S1 & S2, normal rate, regular rhythm  Abdomen: soft, non-tender, non-distended, normoactive bowel sounds  Back: no CVA tenderness  Extremities: 1-2+ edema of both legs  Skin: no rash  Neuro: awake, alert, oriented      Laboratory Results:  Lab Results   Component Value Date    WBC 5 74 07/08/2020    HGB 11 1 (L) 07/08/2020    HCT 35 9 (L) 07/08/2020    MCV 89 07/08/2020     07/08/2020     Lab Results   Component Value Date    SODIUM 144 08/04/2020    K 3 8 08/04/2020     (H) 08/04/2020    CO2 29 08/04/2020    BUN 46 (H) 08/04/2020    CREATININE 2 28 (H) 08/04/2020    GLUC 112 08/04/2020    CALCIUM 8 5 08/04/2020     Lab Results   Component Value Date    PTH 77 1 07/08/2020    CALCIUM 8 5 08/04/2020    PHOS 3 0 07/08/2020             Portions of the record may have been created with voice recognition software  Occasional wrong word or "sound a like" substitutions may have occurred due to the inherent limitations of voice recognition software  Read the chart carefully and recognize, using context, where substitutions have occurred  If you have any questions, please contact the dictating provider

## 2020-08-14 ENCOUNTER — TELEPHONE (OUTPATIENT)
Dept: FAMILY MEDICINE CLINIC | Facility: CLINIC | Age: 80
End: 2020-08-14

## 2020-08-19 DIAGNOSIS — N17.9 AKI (ACUTE KIDNEY INJURY) (HCC): ICD-10-CM

## 2020-08-19 DIAGNOSIS — Z98.890 S/P AORTA REPAIR: ICD-10-CM

## 2020-08-19 DIAGNOSIS — I71.01 DISSECTION OF THORACIC AORTA (HCC): ICD-10-CM

## 2020-08-19 DIAGNOSIS — N17.9 ACUTE KIDNEY INJURY SUPERIMPOSED ON CHRONIC KIDNEY DISEASE (HCC): ICD-10-CM

## 2020-08-19 DIAGNOSIS — N18.9 ACUTE KIDNEY INJURY SUPERIMPOSED ON CHRONIC KIDNEY DISEASE (HCC): ICD-10-CM

## 2020-08-19 RX ORDER — ATORVASTATIN CALCIUM 40 MG/1
40 TABLET, FILM COATED ORAL DAILY
Qty: 90 TABLET | Refills: 0 | Status: SHIPPED | OUTPATIENT
Start: 2020-08-19 | End: 2020-08-28

## 2020-08-20 ENCOUNTER — TELEPHONE (OUTPATIENT)
Dept: NEPHROLOGY | Facility: CLINIC | Age: 80
End: 2020-08-20

## 2020-08-20 NOTE — TELEPHONE ENCOUNTER
I called patient back, no answer, left a message recommended to increase diuretics to 1 tablet twice a day, continue with low-salt diet, call as next week to see edema improved  Please call patient and convey same message, recommend to increase diuretics to 1 tablet twice a day and see how his edema goes over the next 5-7 days and call us back next week    Thanks,          I cc message to patient's PCP to keep her updated

## 2020-08-20 NOTE — TELEPHONE ENCOUNTER
Pt called and states that his swelling has not improved, it is the same  Let him know I'd reach out to the doctor with the update

## 2020-08-25 NOTE — TELEPHONE ENCOUNTER
A message has been left asking pt to contact the office to go over Dr Dionna Mathew recommendations from Thursday 8/20

## 2020-08-27 DIAGNOSIS — I10 HYPERTENSION, UNSPECIFIED TYPE: ICD-10-CM

## 2020-08-27 RX ORDER — AMLODIPINE BESYLATE 5 MG/1
TABLET ORAL
Qty: 30 TABLET | Refills: 3 | OUTPATIENT
Start: 2020-08-27

## 2020-08-28 DIAGNOSIS — N18.9 ACUTE KIDNEY INJURY SUPERIMPOSED ON CHRONIC KIDNEY DISEASE (HCC): ICD-10-CM

## 2020-08-28 DIAGNOSIS — Z98.890 S/P AORTA REPAIR: ICD-10-CM

## 2020-08-28 DIAGNOSIS — N17.9 ACUTE KIDNEY INJURY SUPERIMPOSED ON CHRONIC KIDNEY DISEASE (HCC): ICD-10-CM

## 2020-08-28 DIAGNOSIS — N17.9 AKI (ACUTE KIDNEY INJURY) (HCC): ICD-10-CM

## 2020-08-28 DIAGNOSIS — I71.01 DISSECTION OF THORACIC AORTA (HCC): ICD-10-CM

## 2020-08-28 RX ORDER — ATORVASTATIN CALCIUM 40 MG/1
40 TABLET, FILM COATED ORAL DAILY
Qty: 90 TABLET | Refills: 0 | Status: SHIPPED | OUTPATIENT
Start: 2020-08-28 | End: 2021-01-19

## 2020-08-31 ENCOUNTER — HOSPITAL ENCOUNTER (EMERGENCY)
Facility: HOSPITAL | Age: 80
Discharge: HOME | End: 2020-08-31
Attending: EMERGENCY MEDICINE
Payer: COMMERCIAL

## 2020-08-31 VITALS
SYSTOLIC BLOOD PRESSURE: 176 MMHG | TEMPERATURE: 98 F | BODY MASS INDEX: 27.28 KG/M2 | RESPIRATION RATE: 18 BRPM | HEIGHT: 68 IN | HEART RATE: 70 BPM | DIASTOLIC BLOOD PRESSURE: 91 MMHG | OXYGEN SATURATION: 97 % | WEIGHT: 180 LBS

## 2020-08-31 DIAGNOSIS — R60.0 BILATERAL LOWER EXTREMITY EDEMA: Primary | ICD-10-CM

## 2020-08-31 DIAGNOSIS — N50.89 SCROTAL EDEMA: ICD-10-CM

## 2020-08-31 DIAGNOSIS — M54.50 LOWER BACK PAIN: ICD-10-CM

## 2020-08-31 DIAGNOSIS — N48.89 PENILE EDEMA: ICD-10-CM

## 2020-08-31 LAB
ALBUMIN SERPL BCP-MCNC: 2.6 G/DL (ref 3.5–5)
ALP SERPL-CCNC: 130 U/L (ref 46–116)
ALT SERPL W P-5'-P-CCNC: 22 U/L (ref 12–78)
ANION GAP SERPL CALCULATED.3IONS-SCNC: 4 MMOL/L (ref 4–13)
AST SERPL W P-5'-P-CCNC: 45 U/L (ref 5–45)
BASOPHILS # BLD AUTO: 0.03 THOUSANDS/ΜL (ref 0–0.1)
BASOPHILS NFR BLD AUTO: 0 % (ref 0–1)
BILIRUB SERPL-MCNC: 0.99 MG/DL (ref 0.2–1)
BUN SERPL-MCNC: 40 MG/DL (ref 5–25)
CALCIUM SERPL-MCNC: 8.5 MG/DL (ref 8.3–10.1)
CHLORIDE SERPL-SCNC: 115 MMOL/L (ref 100–108)
CO2 SERPL-SCNC: 28 MMOL/L (ref 21–32)
CREAT SERPL-MCNC: 1.88 MG/DL (ref 0.6–1.3)
EOSINOPHIL # BLD AUTO: 0.14 THOUSAND/ΜL (ref 0–0.61)
EOSINOPHIL NFR BLD AUTO: 2 % (ref 0–6)
ERYTHROCYTE [DISTWIDTH] IN BLOOD BY AUTOMATED COUNT: 18.4 % (ref 11.6–15.1)
GFR SERPL CREATININE-BSD FRML MDRD: 33 ML/MIN/1.73SQ M
GLUCOSE SERPL-MCNC: 92 MG/DL (ref 65–140)
HCT VFR BLD AUTO: 31.4 % (ref 36.5–49.3)
HGB BLD-MCNC: 9.8 G/DL (ref 12–17)
IMM GRANULOCYTES # BLD AUTO: 0.03 THOUSAND/UL (ref 0–0.2)
IMM GRANULOCYTES NFR BLD AUTO: 0 % (ref 0–2)
LYMPHOCYTES # BLD AUTO: 1.2 THOUSANDS/ΜL (ref 0.6–4.47)
LYMPHOCYTES NFR BLD AUTO: 17 % (ref 14–44)
MCH RBC QN AUTO: 29.3 PG (ref 26.8–34.3)
MCHC RBC AUTO-ENTMCNC: 31.2 G/DL (ref 31.4–37.4)
MCV RBC AUTO: 94 FL (ref 82–98)
MONOCYTES # BLD AUTO: 1.1 THOUSAND/ΜL (ref 0.17–1.22)
MONOCYTES NFR BLD AUTO: 15 % (ref 4–12)
NEUTROPHILS # BLD AUTO: 4.64 THOUSANDS/ΜL (ref 1.85–7.62)
NEUTS SEG NFR BLD AUTO: 66 % (ref 43–75)
NRBC BLD AUTO-RTO: 0 /100 WBCS
NT-PROBNP SERPL-MCNC: 3185 PG/ML
PLATELET # BLD AUTO: 158 THOUSANDS/UL (ref 149–390)
PMV BLD AUTO: 9.5 FL (ref 8.9–12.7)
POTASSIUM SERPL-SCNC: 3.8 MMOL/L (ref 3.5–5.3)
PROT SERPL-MCNC: 8.1 G/DL (ref 6.4–8.2)
RBC # BLD AUTO: 3.35 MILLION/UL (ref 3.88–5.62)
SODIUM SERPL-SCNC: 147 MMOL/L (ref 136–145)
WBC # BLD AUTO: 7.14 THOUSAND/UL (ref 4.31–10.16)

## 2020-08-31 PROCEDURE — 36415 COLL VENOUS BLD VENIPUNCTURE: CPT | Performed by: EMERGENCY MEDICINE

## 2020-08-31 PROCEDURE — 83880 ASSAY OF NATRIURETIC PEPTIDE: CPT | Performed by: EMERGENCY MEDICINE

## 2020-08-31 PROCEDURE — 99283 EMERGENCY DEPT VISIT LOW MDM: CPT

## 2020-08-31 PROCEDURE — 80053 COMPREHEN METABOLIC PANEL: CPT | Performed by: EMERGENCY MEDICINE

## 2020-08-31 PROCEDURE — 85025 COMPLETE CBC W/AUTO DIFF WBC: CPT | Performed by: EMERGENCY MEDICINE

## 2020-08-31 PROCEDURE — 99282 EMERGENCY DEPT VISIT SF MDM: CPT | Performed by: EMERGENCY MEDICINE

## 2020-08-31 NOTE — ED PROVIDER NOTES
History  Chief Complaint   Patient presents with    Leg Swelling     Pt c/o bilateral leg swelling for 1 month  Pt denies CP/SOB but c/o lower "spine" pain  Pt is a 70yo M who presents for multiple complaints  Patient states that he has bilateral lower extremity swelling that has been going on for several months  Patient follows with nephrology and was just recently increased on his Bumex  Patient states that his legs has still not decreased in swelling  Patient reports that they have not been worsening but have maintained the same  Patient states that is better when he wakes up in the morning and worsened throughout the day  Patient denies any pain in his lower extremities and is still able to ambulate  Patient also describing lower back pain that he has had for several weeks  Patient was seen and had x-rays that were unremarkable  Patient states that it has not worsened but is also not improved  Patient denying any saddle anesthesia, radiation of the pain down his lower extremities, fevers, or IV drug use  Patient also complaining of penile swelling  Patient states that he 1st noticed it this morning and has not had anything similar to this previously  Patient still able to urinate and states that his urination has increased since increasing his Bumex  Patient denying any scrotal swelling along with his penile swelling  Aside from the Bumex increase, patient denies any recent medication changes  Prior to Admission Medications   Prescriptions Last Dose Informant Patient Reported?  Taking?   acetaminophen (TYLENOL) 325 mg tablet  Self No Yes   Sig: Take 1-2 tabs q6h PRN mild fever/pain   atorvastatin (LIPITOR) 40 mg tablet   No Yes   Sig: TAKE 1 TABLET (40 MG TOTAL) BY MOUTH DAILY   diphenhydrAMINE (BENADRYL) 25 mg tablet  Self Yes Yes   Sig: Take 25 mg by mouth every 6 (six) hours as needed for itching   warfarin (COUMADIN) 2 5 mg tablet  Self No Yes   Sig: Take 2 5mg daily unless otherwise directed based on INR  Facility-Administered Medications: None       Past Medical History:   Diagnosis Date    Arthritis     BPH without urinary obstruction     CKD (chronic kidney disease), stage III (HCC)     baseline 1 6-1 7    GERD (gastroesophageal reflux disease)     Hyperlipidemia     Hypertension     MI [facility]my[facility] infarction) ([facility] [address] )        Past Surgical History:   Procedure Laterality Date    APPENDECTOMY      CARDIAC SURGERY      COLONOSCOPY  2017    FRACTURE SURGERY      IR TEMPORARY DIALYSIS CATHETER PLACEMENT  2019    IR THORACENTESIS  2019    IR THORACENTESIS  2019    IR TUNNELED DIALYSIS CATHETER CHECK/CHANGE/REPOSITION/ANGIOPLASTY  2020    IR TUNNELED DIALYSIS CATHETER PLACEMENT  2020    IR TUNNELED DIALYSIS CATHETER REMOVAL  3/4/2020    ID ASCEND AORTA GRAFT W ROOT REPLACMENT  VALVE CONDUIT/CORON RECONSTRUCT N/A 12/15/2019    Procedure: BENTALL PROCEDURE (ASCENDING AORTIC REPAIR) with 26mm Gelweave Graft;  Surgeon: Marietta Albert DO;  [facility];  Service: Cardiac Surgery    ID RECONSTR TOTAL SHOULDER IMPLANT Right 2017    Procedure: ARTHROPLASTY SHOULDER REVERSE with OPEN CYST EXCISION;  Surgeon: Emmanuel Alvarado MD;  [facility];  Service: Orthopedics    SHOULDER SURGERY      WRIST SURGERY         Family History   Problem Relation Age of Onset    No Known Problems Mother     Hypertension Father     Arthritis Family      I have reviewed and agree with the history as documented      E-Cigarette/Vaping    E-Cigarette Use Never User      E-Cigarette/Vaping Substances    Nicotine No     THC No     CBD No     Flavoring No     Other No     Unknown No      Social History     Tobacco Use    Smoking status: Former Smoker     Packs/day: 1 00     Last attempt to quit: 1980     Years since quittin 6    Smokeless tobacco: Never Used   Substance Use Topics    Alcohol use: Not Currently    Drug use: Not Currently Review of Systems   Constitutional: Negative for activity change, appetite change, chills, fatigue and fever  HENT: Negative for congestion, ear discharge, ear pain, sinus pressure, sinus pain and sore throat  Eyes: Negative for pain and redness  Respiratory: Negative for cough, chest tightness, shortness of breath and wheezing  Cardiovascular: Positive for leg swelling (bilateral)  Negative for chest pain and palpitations  Gastrointestinal: Negative for abdominal distention, abdominal pain, blood in stool, diarrhea, nausea and vomiting  Genitourinary: Positive for frequency and penile swelling  Negative for dysuria and urgency  Musculoskeletal: Positive for back pain (lower)  Negative for arthralgias, gait problem, neck pain and neck stiffness  Skin: Negative for color change, pallor, rash and wound  Neurological: Negative for dizziness, tremors, syncope, weakness, numbness and headaches  Psychiatric/Behavioral: Negative for agitation, behavioral problems and confusion  The patient is not nervous/anxious  Physical Exam  ED Triage Vitals   Temperature Pulse Respirations Blood Pressure SpO2   08/31/20 1839 08/31/20 1839 08/31/20 1839 08/31/20 1839 08/31/20 1839   98 °F (36 7 °C) 95 18 161/89 96 %      Temp Source Heart Rate Source Patient Position - Orthostatic VS BP Location FiO2 (%)   08/31/20 1839 08/31/20 1930 08/31/20 1839 08/31/20 1839 --   Oral Monitor Sitting Left arm       Pain Score       08/31/20 1839       8             Orthostatic Vital Signs  Vitals:    08/31/20 1839 08/31/20 1930 08/31/20 2030   BP: 161/89 166/76 (!) 176/91   Pulse: 95 86 70   Patient Position - Orthostatic VS: Sitting Lying Lying       Physical Exam  Vitals signs reviewed  Constitutional:       General: He is not in acute distress  Appearance: Normal appearance  He is well-developed  He is not ill-appearing, toxic-appearing or diaphoretic  HENT:      Head: Normocephalic and atraumatic  Right Ear: External ear normal       Left Ear: External ear normal       Nose: Nose normal    Eyes:      Extraocular Movements: Extraocular movements intact  Conjunctiva/sclera: Conjunctivae normal       Pupils: Pupils are equal, round, and reactive to light  Neck:      Musculoskeletal: Normal range of motion and neck supple  No neck rigidity or muscular tenderness  Cardiovascular:      Rate and Rhythm: Normal rate and regular rhythm  Pulses: Normal pulses  Dorsalis pedis pulses are 2+ on the right side and 2+ on the left side  Heart sounds: Normal heart sounds  No murmur  Pulmonary:      Effort: Pulmonary effort is normal  No respiratory distress  Breath sounds: Normal breath sounds  No stridor  No wheezing  Abdominal:      General: Bowel sounds are normal  There is no distension  Palpations: Abdomen is soft  There is no mass  Tenderness: There is no abdominal tenderness  There is no guarding  Genitourinary:     Comments: Penile and scrotal swelling  Foreskin still retractable  Musculoskeletal: Normal range of motion  General: No deformity  Lumbar back: He exhibits tenderness  He exhibits normal range of motion, no swelling and no edema  Back:       Right lower leg: Edema (2+ pitting) present  Left lower leg: Edema (2+ pitting) present  Skin:     General: Skin is warm and dry  Capillary Refill: Capillary refill takes less than 2 seconds  Coloration: Skin is not pale  Findings: No erythema or rash  Neurological:      Mental Status: He is alert and oriented to person, place, and time  Sensory: Sensation is intact  Motor: Motor function is intact  Comments: CMS intact in bilateral lower extremities  Straight leg normal bilaterally  Plantarflexion and dorsiflexion intact bilaterally  Psychiatric:         Mood and Affect: Mood normal          Behavior: Behavior normal          Thought Content:  Thought content normal          Judgment: Judgment normal          ED Medications  Medications - No data to display    Diagnostic Studies  Results Reviewed     Procedure Component Value Units Date/Time    NT-BNP PRO [[id number]]  (Abnormal) Collected:  08/31/20 1945    Lab Status:  Final result Specimen:  Blood from Arm, Left Updated:  08/31/20 2018     NT-proBNP 3,185 pg/mL     Comprehensive metabolic panel [[id number]]  (Abnormal) Collected:  08/31/20 1945    Lab Status:  Final result Specimen:  Blood from Arm, Left Updated:  08/31/20 2016     Sodium 147 mmol/L      Potassium 3 8 mmol/L      Chloride 115 mmol/L      CO2 28 mmol/L      ANION GAP 4 mmol/L      BUN 40 mg/dL      Creatinine 1 88 mg/dL      Glucose 92 mg/dL      Calcium 8 5 mg/dL      AST 45 U/L      ALT 22 U/L      Alkaline Phosphatase 130 U/L      Total Protein 8 1 g/dL      Albumin 2 6 g/dL      Total Bilirubin 0 99 mg/dL      eGFR 33 ml/min/1 73sq m     Narrative:       Magdalene guidelines for Chronic Kidney Disease (CKD):     Stage 1 with normal or high GFR (GFR > 90 mL/min/1 73 square meters)    Stage 2 Mild CKD (GFR = 60-89 mL/min/1 73 square meters)    Stage 3A Moderate CKD (GFR = 45-59 mL/min/1 73 square meters)    Stage 3B Moderate CKD (GFR = 30-44 mL/min/1 73 square meters)    Stage 4 Severe CKD (GFR = 15-29 mL/min/1 73 square meters)    Stage 5 End Stage CKD (GFR <15 mL/min/1 73 square meters)  Note: GFR calculation is accurate only with a steady state creatinine    CBC and differential [[id number]]  (Abnormal) Collected:  08/31/20 1945    Lab Status:  Final result Specimen:  Blood from Arm, Left Updated:  08/31/20 1952     WBC 7 14 Thousand/uL      RBC 3 35 Million/uL      Hemoglobin 9 8 g/dL      Hematocrit 31 4 %      MCV 94 fL      MCH 29 3 pg      MCHC 31 2 g/dL      RDW 18 4 %      MPV 9 5 fL      Platelets 916 Thousands/uL      nRBC 0 /100 WBCs      Neutrophils Relative 66 %      Immat GRANS % 0 % Lymphocytes Relative 17 %      Monocytes Relative 15 %      Eosinophils Relative 2 %      Basophils Relative 0 %      Neutrophils Absolute 4 64 Thousands/µL      Immature Grans Absolute 0 03 Thousand/uL      Lymphocytes Absolute 1 20 Thousands/µL      Monocytes Absolute 1 10 Thousand/µL      Eosinophils Absolute 0 14 Thousand/µL      Basophils Absolute 0 03 Thousands/µL                  No orders to display         Procedures  Procedures      ED Course  ED Course as of Sep 02 1535   Mon Aug 31, 2020   1954 Anemia decreased from prior  No intervention required at this time  Hemoglobin(!): 9 8 2043 Creat improved from prior  Creatinine(!): 1 88   2102 BNP improved from prior  NT-proBNP(!): 3,185   2102 Creatinine(!): 1 88       US AUDIT      Most Recent Value   Initial Alcohol Screen: US AUDIT-C    1  How often do you have a drink containing alcohol?  0 Filed at: 08/31/2020 1941   2  How many drinks containing alcohol do you have on a typical day you are drinking? 0 Filed at: 08/31/2020 1941   3a  Male UNDER 65: How often do you have five or more drinks on one occasion? 0 Filed at: 08/31/2020 1941   3b  FEMALE Any Age, or MALE 65+: How often do you have 4 or more drinks on one occassion? 0 Filed at: 08/31/2020 1941   Audit-C Score  0 Filed at: 08/31/2020 1941                  AFTAB/DAST-10      Most Recent Value   How many times in the past year have you    Used an illegal drug or used a prescription medication for non-medical reasons? Never Filed at: 08/31/2020 1941                              MDM  Number of Diagnoses or Management Options  Bilateral lower extremity edema:   Lower back pain:   Penile edema:   Scrotal edema:   Diagnosis management comments: Pt is a 70yo M who presents with chronic lower extremity swelling and new penile and scrotal swelling  Exam pertinent for non-tender bilateral pitting edema in lower extremities as well as penile and scrotal swelling      Differential diagnosis to include but not limited to kidney injury, heart failure, venous stasis  Based on pt's new onset penile and scrotal swelling in the setting of increased diuretic usage, basic labs were ordered to assess kidney function  Creatinine was improved from prior as was BNP  CBC was unremarkable  No intervention required at this time  Discussed with pt results of labs and recommended continued follow-up with nephrology and to continue taking his diuretic as prescribed  Plan to discharge pt with follow-up to nephrology  Discussed continued use of his diuretic as stated by nephrology  Discussed returning to the ED with significant worsening of symptoms  Pt expressed understanding of discharge instructions, medication instructions, and return precautions  All questions were answered  Pt was discharged without incident  Amount and/or Complexity of Data Reviewed  Clinical lab tests: ordered and reviewed  Discussion of test results with the performing providers: yes          Disposition  Final diagnoses:   Bilateral lower extremity edema   Scrotal edema   Penile edema   Lower back pain     Time reflects when diagnosis was documented in both MDM as applicable and the Disposition within this note     Time User Action Codes Description Comment    8/31/2020  9:03 PM Eddy Lennert Add [R60 0] Lower extremity edema     8/31/2020  9:03 PM Eddy Lennert Remove [R60 0] Lower extremity edema     8/31/2020  9:03 PM Eddy Lennert Add [R60 0] Bilateral lower extremity edema     8/31/2020  9:04 PM Eddy Lennert Add [N50 89] Scrotal edema     8/31/2020  9:04 PM Eddy Lennert Add [Q43 64] Penile edema     8/31/2020  9:04 PM Eddy Lennert Add [M54 5] Lower back pain       ED Disposition     ED Disposition Condition Date/Time Comment    Discharge Stable Mon Aug 31, 2020  9:03 PM [facility] A Core discharge to home/self care              Follow-up Information     Follow up With Specialties Details Why Contact Info Georgina Ramesh MD Nephrology Call in 1 day  [facility] [address]  [phone]            Discharge Medication List as of 8/31/2020  9:05 PM      CONTINUE these medications which have NOT CHANGED    Details   acetaminophen (TYLENOL) 325 mg tablet Take 1-2 tabs q6h PRN mild fever/pain, Normal      atorvastatin (LIPITOR) 40 mg tablet TAKE 1 TABLET (40 MG TOTAL) BY MOUTH DAILY, Starting Fri 8/28/2020, Normal      diphenhydrAMINE (BENADRYL) 25 mg tablet Take 25 mg by mouth every 6 (six) hours as needed for itching, Historical Med      warfarin (COUMADIN) 2 5 mg tablet Take 2 5mg daily unless otherwise directed based on INR , Normal           No discharge procedures on file  PDMP Review       Value Time User    PDMP Reviewed  Yes 1/7/2020 10:50 AM Dave Lombard, PA-C           ED Provider  Attending physically available and evaluated [facility]  I managed the patient along with the ED Attending      Electronically Signed by         Jannis Mcardle, MD  09/02/20 8503

## 2020-08-31 NOTE — ED ATTENDING ATTESTATION
Maryellen Oleary MD, saw and evaluated the patient  I have discussed the patient with the resident and agree with the resident's findings, Plan of Care, and MDM as documented in the resident's note, except where noted  All available labs and Radiology studies were reviewed  I was present for key portions of any procedure(s) performed by the resident and I was immediately available to provide assistance  At this point I agree with the current assessment done in the Emergency Department  I have conducted an independent evaluation of this patient a history and physical is as follows:    67 yo male with a complicated past medical history presents to the ED complaining of bilateral symmetrical lower extremity swelling x "months"  The patient is closely followed by nephrology and recently had his Bumex increased but he is upset because his lower extremity edema has not yet improved  The swelling improves overnight and worsens throughout the day  It is not changed from baseline  (+) Secondary complaint of some mild penile swelling x "a few weeks"  No pain in the penis  No difficulty urinating  No rash or discoloration  Lastly, the patient also reports some chronic low back pain  This is also unchanged from baseline  No numbness/weakness  No incontinence/retention  No difficulty ambulating  No other specific complaints  Gen: NAD, AA&Ox3  HEENT: PERRL, EOMI  Neck: supple  CV: RRR  Lungs: CT AB/L  Abdomen: soft, NT/ND  : mild penile swelling, no erythema/warmth/tenderness, foreskin retractable  Ext: (+) symmetrical bilateral pitting edema, no erythema/warmth/tenderness, pulses intact  Neuro: 5/5 strength all extremities      ED Course  The patient is very well appearing with stable vital signs  None of his complaints seem acute --> back pain and LE swelling are both at baseline  Creatinine and BNP are improved from last measurements  No acute interventions required at this time   The patient has good follow up and is comfortable seeing his nephrologist and PCP later this week for reassessment/further workup  Strict return precautions provided        Critical Care Time  Procedures

## 2020-09-01 NOTE — DISCHARGE INSTRUCTIONS
Follow-up with nephrology for further care  Call them tomorrow to schedule follow-up  Return to the Ed with significant worsening of your symptoms  Continue taking your medications as prescribed

## 2020-09-03 ENCOUNTER — TELEPHONE (OUTPATIENT)
Dept: NEPHROLOGY | Facility: CLINIC | Age: 80
End: 2020-09-03

## 2020-09-03 DIAGNOSIS — N18.30 BENIGN HYPERTENSION WITH CKD (CHRONIC KIDNEY DISEASE) STAGE III (HCC): Primary | Chronic | ICD-10-CM

## 2020-09-03 DIAGNOSIS — I12.9 BENIGN HYPERTENSION WITH CKD (CHRONIC KIDNEY DISEASE) STAGE III (HCC): Primary | Chronic | ICD-10-CM

## 2020-09-03 RX ORDER — BUMETANIDE 2 MG/1
2 TABLET ORAL DAILY
Qty: 60 TABLET | Refills: 5 | Status: ON HOLD | OUTPATIENT
Start: 2020-09-03 | End: 2020-10-09 | Stop reason: SDUPTHER

## 2020-09-03 NOTE — TELEPHONE ENCOUNTER
Patient was on Bumex 2mg daily and Dr Sergio Cortez increased it to 2mg BID  Somehow the medication is not on the patient's medication list any longer  Patient ran out of pills before refill was due, due to the increase  He needs a refill sent to the pharmacy

## 2020-09-14 ENCOUNTER — ANTICOAG VISIT (OUTPATIENT)
Dept: CARDIOLOGY CLINIC | Facility: CLINIC | Age: 80
End: 2020-09-14

## 2020-09-14 ENCOUNTER — APPOINTMENT (OUTPATIENT)
Dept: LAB | Facility: HOSPITAL | Age: 80
End: 2020-09-14
Attending: INTERNAL MEDICINE
Payer: COMMERCIAL

## 2020-09-14 DIAGNOSIS — Z98.890 S/P AORTIC DISSECTION REPAIR: ICD-10-CM

## 2020-09-14 DIAGNOSIS — I71.01 DISSECTION OF THORACIC AORTA (HCC): ICD-10-CM

## 2020-09-14 DIAGNOSIS — N18.30 BENIGN HYPERTENSION WITH CKD (CHRONIC KIDNEY DISEASE) STAGE III (HCC): Chronic | ICD-10-CM

## 2020-09-14 DIAGNOSIS — Z79.01 ANTICOAGULATION GOAL OF INR 2 TO 3: ICD-10-CM

## 2020-09-14 DIAGNOSIS — Z51.81 ANTICOAGULATION GOAL OF INR 2 TO 3: ICD-10-CM

## 2020-09-14 DIAGNOSIS — I12.9 BENIGN HYPERTENSION WITH CKD (CHRONIC KIDNEY DISEASE) STAGE III (HCC): Chronic | ICD-10-CM

## 2020-09-14 LAB
ANION GAP SERPL CALCULATED.3IONS-SCNC: 4 MMOL/L (ref 4–13)
BUN SERPL-MCNC: 45 MG/DL (ref 5–25)
CALCIUM SERPL-MCNC: 8.4 MG/DL (ref 8.3–10.1)
CHLORIDE SERPL-SCNC: 110 MMOL/L (ref 100–108)
CO2 SERPL-SCNC: 29 MMOL/L (ref 21–32)
CREAT SERPL-MCNC: 2.03 MG/DL (ref 0.6–1.3)
GFR SERPL CREATININE-BSD FRML MDRD: 30 ML/MIN/1.73SQ M
GLUCOSE P FAST SERPL-MCNC: 112 MG/DL (ref 65–99)
POTASSIUM SERPL-SCNC: 3.9 MMOL/L (ref 3.5–5.3)
SODIUM SERPL-SCNC: 143 MMOL/L (ref 136–145)

## 2020-09-14 PROCEDURE — 80048 BASIC METABOLIC PNL TOTAL CA: CPT

## 2020-09-18 DIAGNOSIS — Z98.890 S/P AORTA REPAIR: ICD-10-CM

## 2020-09-18 DIAGNOSIS — N17.9 AKI (ACUTE KIDNEY INJURY) (HCC): ICD-10-CM

## 2020-09-18 DIAGNOSIS — I71.01 DISSECTION OF THORACIC AORTA (HCC): ICD-10-CM

## 2020-09-18 DIAGNOSIS — N18.9 ACUTE KIDNEY INJURY SUPERIMPOSED ON CHRONIC KIDNEY DISEASE (HCC): ICD-10-CM

## 2020-09-18 DIAGNOSIS — N17.9 ACUTE KIDNEY INJURY SUPERIMPOSED ON CHRONIC KIDNEY DISEASE (HCC): ICD-10-CM

## 2020-09-18 RX ORDER — WARFARIN SODIUM 2.5 MG/1
TABLET ORAL
Qty: 90 TABLET | Refills: 1 | Status: ON HOLD | OUTPATIENT
Start: 2020-09-18 | End: 2020-10-09 | Stop reason: SDUPTHER

## 2020-09-24 DIAGNOSIS — I10 HYPERTENSION, UNSPECIFIED TYPE: ICD-10-CM

## 2020-09-24 RX ORDER — AMLODIPINE BESYLATE 5 MG/1
TABLET ORAL
Qty: 30 TABLET | Refills: 3 | Status: SHIPPED | OUTPATIENT
Start: 2020-09-24 | End: 2020-11-13 | Stop reason: ALTCHOICE

## 2020-10-04 ENCOUNTER — APPOINTMENT (EMERGENCY)
Dept: RADIOLOGY | Facility: HOSPITAL | Age: 80
DRG: 291 | End: 2020-10-04
Payer: COMMERCIAL

## 2020-10-04 ENCOUNTER — HOSPITAL ENCOUNTER (INPATIENT)
Facility: HOSPITAL | Age: 80
LOS: 5 days | Discharge: HOME | DRG: 291 | End: 2020-10-09
Attending: EMERGENCY MEDICINE | Admitting: FAMILY MEDICINE
Payer: COMMERCIAL

## 2020-10-04 DIAGNOSIS — N17.9 AKI (ACUTE KIDNEY INJURY) (HCC): ICD-10-CM

## 2020-10-04 DIAGNOSIS — I50.9 ACUTE CONGESTIVE HEART FAILURE, UNSPECIFIED HEART FAILURE TYPE (HCC): ICD-10-CM

## 2020-10-04 DIAGNOSIS — N18.9 ACUTE KIDNEY INJURY SUPERIMPOSED ON CHRONIC KIDNEY DISEASE (HCC): ICD-10-CM

## 2020-10-04 DIAGNOSIS — N17.9 ACUTE RENAL FAILURE SUPERIMPOSED ON STAGE 4 CHRONIC KIDNEY DISEASE, UNSPECIFIED ACUTE RENAL FAILURE TYPE (HCC): Chronic | ICD-10-CM

## 2020-10-04 DIAGNOSIS — I50.33 ACUTE ON CHRONIC DIASTOLIC (CONGESTIVE) HEART FAILURE (HCC): Primary | ICD-10-CM

## 2020-10-04 DIAGNOSIS — N18.30 BENIGN HYPERTENSION WITH CKD (CHRONIC KIDNEY DISEASE) STAGE III (HCC): Chronic | ICD-10-CM

## 2020-10-04 DIAGNOSIS — I12.9 BENIGN HYPERTENSION WITH CKD (CHRONIC KIDNEY DISEASE) STAGE III (HCC): Chronic | ICD-10-CM

## 2020-10-04 DIAGNOSIS — N17.9 ACUTE KIDNEY INJURY SUPERIMPOSED ON CHRONIC KIDNEY DISEASE (HCC): ICD-10-CM

## 2020-10-04 DIAGNOSIS — N18.9 CKD (CHRONIC KIDNEY DISEASE): ICD-10-CM

## 2020-10-04 DIAGNOSIS — I71.01 DISSECTION OF THORACIC AORTA (HCC): ICD-10-CM

## 2020-10-04 DIAGNOSIS — E87.6 HYPOKALEMIA: ICD-10-CM

## 2020-10-04 DIAGNOSIS — N18.4 ACUTE RENAL FAILURE SUPERIMPOSED ON STAGE 4 CHRONIC KIDNEY DISEASE, UNSPECIFIED ACUTE RENAL FAILURE TYPE (HCC): Chronic | ICD-10-CM

## 2020-10-04 DIAGNOSIS — Z98.890 S/P AORTA REPAIR: ICD-10-CM

## 2020-10-04 PROBLEM — Z86.16 HISTORY OF 2019 NOVEL CORONAVIRUS DISEASE (COVID-19): Status: ACTIVE | Noted: 2020-07-15

## 2020-10-04 LAB
ALBUMIN SERPL BCP-MCNC: 2.5 G/DL (ref 3.5–5)
ALP SERPL-CCNC: 140 U/L (ref 46–116)
ALT SERPL W P-5'-P-CCNC: 23 U/L (ref 12–78)
ANION GAP SERPL CALCULATED.3IONS-SCNC: 8 MMOL/L (ref 4–13)
APTT PPP: 66 SECONDS (ref 23–37)
AST SERPL W P-5'-P-CCNC: 57 U/L (ref 5–45)
BASOPHILS # BLD AUTO: 0.02 THOUSANDS/ΜL (ref 0–0.1)
BASOPHILS NFR BLD AUTO: 0 % (ref 0–1)
BILIRUB SERPL-MCNC: 0.63 MG/DL (ref 0.2–1)
BILIRUB UR QL STRIP: NEGATIVE
BUN SERPL-MCNC: 59 MG/DL (ref 5–25)
CALCIUM ALBUM COR SERPL-MCNC: 9.4 MG/DL (ref 8.3–10.1)
CALCIUM SERPL-MCNC: 8.2 MG/DL (ref 8.3–10.1)
CHLORIDE SERPL-SCNC: 109 MMOL/L (ref 100–108)
CLARITY UR: CLEAR
CO2 SERPL-SCNC: 27 MMOL/L (ref 21–32)
COLOR UR: YELLOW
CREAT SERPL-MCNC: 2.57 MG/DL (ref 0.6–1.3)
EOSINOPHIL # BLD AUTO: 0.15 THOUSAND/ΜL (ref 0–0.61)
EOSINOPHIL NFR BLD AUTO: 3 % (ref 0–6)
ERYTHROCYTE [DISTWIDTH] IN BLOOD BY AUTOMATED COUNT: 17.3 % (ref 11.6–15.1)
GFR SERPL CREATININE-BSD FRML MDRD: 23 ML/MIN/1.73SQ M
GLUCOSE SERPL-MCNC: 133 MG/DL (ref 65–140)
GLUCOSE UR STRIP-MCNC: NEGATIVE MG/DL
HCT VFR BLD AUTO: 30 % (ref 36.5–49.3)
HGB BLD-MCNC: 9.1 G/DL (ref 12–17)
HGB UR QL STRIP.AUTO: NEGATIVE
IMM GRANULOCYTES # BLD AUTO: 0.01 THOUSAND/UL (ref 0–0.2)
IMM GRANULOCYTES NFR BLD AUTO: 0 % (ref 0–2)
INR PPP: 2.94 (ref 0.84–1.19)
KETONES UR STRIP-MCNC: NEGATIVE MG/DL
LEUKOCYTE ESTERASE UR QL STRIP: NEGATIVE
LYMPHOCYTES # BLD AUTO: 0.86 THOUSANDS/ΜL (ref 0.6–4.47)
LYMPHOCYTES NFR BLD AUTO: 15 % (ref 14–44)
MCH RBC QN AUTO: 29.4 PG (ref 26.8–34.3)
MCHC RBC AUTO-ENTMCNC: 30.3 G/DL (ref 31.4–37.4)
MCV RBC AUTO: 97 FL (ref 82–98)
MONOCYTES # BLD AUTO: 0.88 THOUSAND/ΜL (ref 0.17–1.22)
MONOCYTES NFR BLD AUTO: 15 % (ref 4–12)
NEUTROPHILS # BLD AUTO: 3.92 THOUSANDS/ΜL (ref 1.85–7.62)
NEUTS SEG NFR BLD AUTO: 67 % (ref 43–75)
NITRITE UR QL STRIP: NEGATIVE
NRBC BLD AUTO-RTO: 0 /100 WBCS
NT-PROBNP SERPL-MCNC: 4285 PG/ML
PH UR STRIP.AUTO: 5.5 [PH] (ref 4.5–8)
PLATELET # BLD AUTO: 142 THOUSANDS/UL (ref 149–390)
PMV BLD AUTO: 10.5 FL (ref 8.9–12.7)
POTASSIUM SERPL-SCNC: 3.7 MMOL/L (ref 3.5–5.3)
PROT SERPL-MCNC: 7.9 G/DL (ref 6.4–8.2)
PROT UR STRIP-MCNC: NEGATIVE MG/DL
PROTHROMBIN TIME: 30.7 SECONDS (ref 11.6–14.5)
RBC # BLD AUTO: 3.1 MILLION/UL (ref 3.88–5.62)
SODIUM SERPL-SCNC: 144 MMOL/L (ref 136–145)
SP GR UR STRIP.AUTO: 1.01 (ref 1–1.03)
TROPONIN I SERPL-MCNC: 0.02 NG/ML
UROBILINOGEN UR QL STRIP.AUTO: 0.2 E.U./DL
WBC # BLD AUTO: 5.84 THOUSAND/UL (ref 4.31–10.16)

## 2020-10-04 PROCEDURE — 99285 EMERGENCY DEPT VISIT HI MDM: CPT | Performed by: PHYSICIAN ASSISTANT

## 2020-10-04 PROCEDURE — 36415 COLL VENOUS BLD VENIPUNCTURE: CPT | Performed by: PHYSICIAN ASSISTANT

## 2020-10-04 PROCEDURE — 85730 THROMBOPLASTIN TIME PARTIAL: CPT | Performed by: PHYSICIAN ASSISTANT

## 2020-10-04 PROCEDURE — 71045 X-RAY EXAM CHEST 1 VIEW: CPT

## 2020-10-04 PROCEDURE — 81003 URINALYSIS AUTO W/O SCOPE: CPT

## 2020-10-04 PROCEDURE — 93005 ELECTROCARDIOGRAM TRACING: CPT

## 2020-10-04 PROCEDURE — 85610 PROTHROMBIN TIME: CPT | Performed by: PHYSICIAN ASSISTANT

## 2020-10-04 PROCEDURE — 80053 COMPREHEN METABOLIC PANEL: CPT | Performed by: PHYSICIAN ASSISTANT

## 2020-10-04 PROCEDURE — 83880 ASSAY OF NATRIURETIC PEPTIDE: CPT | Performed by: PHYSICIAN ASSISTANT

## 2020-10-04 PROCEDURE — 85025 COMPLETE CBC W/AUTO DIFF WBC: CPT | Performed by: PHYSICIAN ASSISTANT

## 2020-10-04 PROCEDURE — 99285 EMERGENCY DEPT VISIT HI MDM: CPT

## 2020-10-04 PROCEDURE — 99223 1ST HOSP IP/OBS HIGH 75: CPT | Performed by: PHYSICIAN ASSISTANT

## 2020-10-04 PROCEDURE — 84484 ASSAY OF TROPONIN QUANT: CPT | Performed by: PHYSICIAN ASSISTANT

## 2020-10-04 RX ORDER — AMLODIPINE BESYLATE 5 MG/1
5 TABLET ORAL DAILY
Status: DISCONTINUED | OUTPATIENT
Start: 2020-10-05 | End: 2020-10-09 | Stop reason: HOSPADM

## 2020-10-04 RX ORDER — HEPARIN SODIUM 5000 [USP'U]/ML
5000 INJECTION, SOLUTION INTRAVENOUS; SUBCUTANEOUS EVERY 8 HOURS SCHEDULED
Status: DISCONTINUED | OUTPATIENT
Start: 2020-10-04 | End: 2020-10-04

## 2020-10-04 RX ORDER — WARFARIN SODIUM 2.5 MG/1
2.5 TABLET ORAL
Status: DISCONTINUED | OUTPATIENT
Start: 2020-10-04 | End: 2020-10-06

## 2020-10-04 RX ORDER — BUMETANIDE 0.25 MG/ML
2 INJECTION, SOLUTION INTRAMUSCULAR; INTRAVENOUS 2 TIMES DAILY
Status: DISCONTINUED | OUTPATIENT
Start: 2020-10-04 | End: 2020-10-09

## 2020-10-04 RX ORDER — FUROSEMIDE 10 MG/ML
40 INJECTION INTRAMUSCULAR; INTRAVENOUS ONCE
Status: COMPLETED | OUTPATIENT
Start: 2020-10-04 | End: 2020-10-04

## 2020-10-04 RX ORDER — ATORVASTATIN CALCIUM 40 MG/1
40 TABLET, FILM COATED ORAL
Status: DISCONTINUED | OUTPATIENT
Start: 2020-10-04 | End: 2020-10-09 | Stop reason: HOSPADM

## 2020-10-04 RX ORDER — ACETAMINOPHEN 325 MG/1
650 TABLET ORAL EVERY 4 HOURS PRN
Status: DISCONTINUED | OUTPATIENT
Start: 2020-10-04 | End: 2020-10-09 | Stop reason: HOSPADM

## 2020-10-04 RX ADMIN — WARFARIN SODIUM 2.5 MG: 2.5 TABLET ORAL at 18:40

## 2020-10-04 RX ADMIN — FUROSEMIDE 40 MG: 10 INJECTION, SOLUTION INTRAMUSCULAR; INTRAVENOUS at 16:26

## 2020-10-04 RX ADMIN — ATORVASTATIN CALCIUM 40 MG: 40 TABLET, FILM COATED ORAL at 18:40

## 2020-10-04 RX ADMIN — BUMETANIDE 2 MG: 0.25 INJECTION INTRAMUSCULAR; INTRAVENOUS at 18:40

## 2020-10-05 PROBLEM — D64.9 ANEMIA: Status: ACTIVE | Noted: 2020-10-05

## 2020-10-05 LAB
ANION GAP SERPL CALCULATED.3IONS-SCNC: 6 MMOL/L (ref 4–13)
ATRIAL RATE: 73 BPM
BUN SERPL-MCNC: 59 MG/DL (ref 5–25)
CALCIUM SERPL-MCNC: 7.9 MG/DL (ref 8.3–10.1)
CHLORIDE SERPL-SCNC: 110 MMOL/L (ref 100–108)
CO2 SERPL-SCNC: 26 MMOL/L (ref 21–32)
CREAT SERPL-MCNC: 2.53 MG/DL (ref 0.6–1.3)
ERYTHROCYTE [DISTWIDTH] IN BLOOD BY AUTOMATED COUNT: 17.2 % (ref 11.6–15.1)
GFR SERPL CREATININE-BSD FRML MDRD: 23 ML/MIN/1.73SQ M
GLUCOSE SERPL-MCNC: 88 MG/DL (ref 65–140)
HCT VFR BLD AUTO: 27.8 % (ref 36.5–49.3)
HGB BLD-MCNC: 8.7 G/DL (ref 12–17)
MCH RBC QN AUTO: 29.5 PG (ref 26.8–34.3)
MCHC RBC AUTO-ENTMCNC: 31.3 G/DL (ref 31.4–37.4)
MCV RBC AUTO: 94 FL (ref 82–98)
P AXIS: 55 DEGREES
PLATELET # BLD AUTO: 134 THOUSANDS/UL (ref 149–390)
PMV BLD AUTO: 10.4 FL (ref 8.9–12.7)
POTASSIUM SERPL-SCNC: 3.4 MMOL/L (ref 3.5–5.3)
PR INTERVAL: 180 MS
QRS AXIS: 55 DEGREES
QRSD INTERVAL: 74 MS
QT INTERVAL: 376 MS
QTC INTERVAL: 406 MS
RBC # BLD AUTO: 2.95 MILLION/UL (ref 3.88–5.62)
SARS-COV-2 RNA RESP QL NAA+PROBE: NEGATIVE
SODIUM SERPL-SCNC: 142 MMOL/L (ref 136–145)
T WAVE AXIS: 207 DEGREES
VENTRICULAR RATE: 70 BPM
VIT B12 SERPL-MCNC: 679 PG/ML (ref 100–900)
WBC # BLD AUTO: 5.91 THOUSAND/UL (ref 4.31–10.16)

## 2020-10-05 PROCEDURE — 87635 SARS-COV-2 COVID-19 AMP PRB: CPT | Performed by: FAMILY MEDICINE

## 2020-10-05 PROCEDURE — 93010 ELECTROCARDIOGRAM REPORT: CPT | Performed by: INTERNAL MEDICINE

## 2020-10-05 PROCEDURE — 82607 VITAMIN B-12: CPT | Performed by: FAMILY MEDICINE

## 2020-10-05 PROCEDURE — 99232 SBSQ HOSP IP/OBS MODERATE 35: CPT | Performed by: FAMILY MEDICINE

## 2020-10-05 PROCEDURE — 85027 COMPLETE CBC AUTOMATED: CPT | Performed by: PHYSICIAN ASSISTANT

## 2020-10-05 PROCEDURE — 99222 1ST HOSP IP/OBS MODERATE 55: CPT | Performed by: INTERNAL MEDICINE

## 2020-10-05 PROCEDURE — 80048 BASIC METABOLIC PNL TOTAL CA: CPT | Performed by: PHYSICIAN ASSISTANT

## 2020-10-05 RX ORDER — POTASSIUM CHLORIDE 20 MEQ/1
40 TABLET, EXTENDED RELEASE ORAL 2 TIMES DAILY
Status: DISCONTINUED | OUTPATIENT
Start: 2020-10-05 | End: 2020-10-09

## 2020-10-05 RX ORDER — POTASSIUM CHLORIDE 20 MEQ/1
40 TABLET, EXTENDED RELEASE ORAL ONCE
Status: COMPLETED | OUTPATIENT
Start: 2020-10-05 | End: 2020-10-05

## 2020-10-05 RX ADMIN — ATORVASTATIN CALCIUM 40 MG: 40 TABLET, FILM COATED ORAL at 18:05

## 2020-10-05 RX ADMIN — BUMETANIDE 2 MG: 0.25 INJECTION INTRAMUSCULAR; INTRAVENOUS at 08:10

## 2020-10-05 RX ADMIN — POTASSIUM CHLORIDE 40 MEQ: 1500 TABLET, EXTENDED RELEASE ORAL at 18:04

## 2020-10-05 RX ADMIN — BUMETANIDE 2 MG: 0.25 INJECTION INTRAMUSCULAR; INTRAVENOUS at 18:06

## 2020-10-05 RX ADMIN — AMLODIPINE BESYLATE 5 MG: 5 TABLET ORAL at 08:09

## 2020-10-05 RX ADMIN — POTASSIUM CHLORIDE 40 MEQ: 1500 TABLET, EXTENDED RELEASE ORAL at 15:18

## 2020-10-05 RX ADMIN — WARFARIN SODIUM 2.5 MG: 2.5 TABLET ORAL at 18:05

## 2020-10-06 ENCOUNTER — APPOINTMENT (INPATIENT)
Dept: NON INVASIVE DIAGNOSTICS | Facility: HOSPITAL | Age: 80
DRG: 291 | End: 2020-10-06
Payer: COMMERCIAL

## 2020-10-06 LAB
ANION GAP SERPL CALCULATED.3IONS-SCNC: 7 MMOL/L (ref 4–13)
BASOPHILS # BLD AUTO: 0.02 THOUSANDS/ΜL (ref 0–0.1)
BASOPHILS NFR BLD AUTO: 0 % (ref 0–1)
BUN SERPL-MCNC: 53 MG/DL (ref 5–25)
CALCIUM SERPL-MCNC: 8.1 MG/DL (ref 8.3–10.1)
CHLORIDE SERPL-SCNC: 111 MMOL/L (ref 100–108)
CO2 SERPL-SCNC: 26 MMOL/L (ref 21–32)
CREAT SERPL-MCNC: 2.14 MG/DL (ref 0.6–1.3)
EOSINOPHIL # BLD AUTO: 0.22 THOUSAND/ΜL (ref 0–0.61)
EOSINOPHIL NFR BLD AUTO: 4 % (ref 0–6)
ERYTHROCYTE [DISTWIDTH] IN BLOOD BY AUTOMATED COUNT: 17.3 % (ref 11.6–15.1)
GFR SERPL CREATININE-BSD FRML MDRD: 29 ML/MIN/1.73SQ M
GLUCOSE SERPL-MCNC: 86 MG/DL (ref 65–140)
HCT VFR BLD AUTO: 29.7 % (ref 36.5–49.3)
HGB BLD-MCNC: 9.1 G/DL (ref 12–17)
IMM GRANULOCYTES # BLD AUTO: 0.02 THOUSAND/UL (ref 0–0.2)
IMM GRANULOCYTES NFR BLD AUTO: 0 % (ref 0–2)
INR PPP: 3.6 (ref 0.84–1.19)
LYMPHOCYTES # BLD AUTO: 1.21 THOUSANDS/ΜL (ref 0.6–4.47)
LYMPHOCYTES NFR BLD AUTO: 20 % (ref 14–44)
MCH RBC QN AUTO: 29.1 PG (ref 26.8–34.3)
MCHC RBC AUTO-ENTMCNC: 30.6 G/DL (ref 31.4–37.4)
MCV RBC AUTO: 95 FL (ref 82–98)
MONOCYTES # BLD AUTO: 1.06 THOUSAND/ΜL (ref 0.17–1.22)
MONOCYTES NFR BLD AUTO: 17 % (ref 4–12)
NEUTROPHILS # BLD AUTO: 3.55 THOUSANDS/ΜL (ref 1.85–7.62)
NEUTS SEG NFR BLD AUTO: 59 % (ref 43–75)
NRBC BLD AUTO-RTO: 0 /100 WBCS
PLATELET # BLD AUTO: 147 THOUSANDS/UL (ref 149–390)
PMV BLD AUTO: 10.4 FL (ref 8.9–12.7)
POTASSIUM SERPL-SCNC: 3.8 MMOL/L (ref 3.5–5.3)
PROTHROMBIN TIME: 35.9 SECONDS (ref 11.6–14.5)
RBC # BLD AUTO: 3.13 MILLION/UL (ref 3.88–5.62)
SODIUM SERPL-SCNC: 144 MMOL/L (ref 136–145)
WBC # BLD AUTO: 6.08 THOUSAND/UL (ref 4.31–10.16)

## 2020-10-06 PROCEDURE — 93306 TTE W/DOPPLER COMPLETE: CPT

## 2020-10-06 PROCEDURE — 99232 SBSQ HOSP IP/OBS MODERATE 35: CPT | Performed by: INTERNAL MEDICINE

## 2020-10-06 PROCEDURE — 80048 BASIC METABOLIC PNL TOTAL CA: CPT | Performed by: FAMILY MEDICINE

## 2020-10-06 PROCEDURE — 85610 PROTHROMBIN TIME: CPT | Performed by: FAMILY MEDICINE

## 2020-10-06 PROCEDURE — 93306 TTE W/DOPPLER COMPLETE: CPT | Performed by: INTERNAL MEDICINE

## 2020-10-06 PROCEDURE — 85025 COMPLETE CBC W/AUTO DIFF WBC: CPT | Performed by: FAMILY MEDICINE

## 2020-10-06 RX ADMIN — POTASSIUM CHLORIDE 40 MEQ: 1500 TABLET, EXTENDED RELEASE ORAL at 17:23

## 2020-10-06 RX ADMIN — BUMETANIDE 2 MG: 0.25 INJECTION INTRAMUSCULAR; INTRAVENOUS at 08:07

## 2020-10-06 RX ADMIN — ATORVASTATIN CALCIUM 40 MG: 40 TABLET, FILM COATED ORAL at 17:23

## 2020-10-06 RX ADMIN — BUMETANIDE 2 MG: 0.25 INJECTION INTRAMUSCULAR; INTRAVENOUS at 17:24

## 2020-10-06 RX ADMIN — AMLODIPINE BESYLATE 5 MG: 5 TABLET ORAL at 08:06

## 2020-10-06 RX ADMIN — POTASSIUM CHLORIDE 40 MEQ: 1500 TABLET, EXTENDED RELEASE ORAL at 08:06

## 2020-10-07 PROBLEM — E87.6 HYPOKALEMIA: Status: RESOLVED | Noted: 2019-12-19 | Resolved: 2020-10-07

## 2020-10-07 LAB
ANION GAP SERPL CALCULATED.3IONS-SCNC: 7 MMOL/L (ref 4–13)
BASOPHILS # BLD AUTO: 0.02 THOUSANDS/ΜL (ref 0–0.1)
BASOPHILS NFR BLD AUTO: 0 % (ref 0–1)
BUN SERPL-MCNC: 49 MG/DL (ref 5–25)
CALCIUM SERPL-MCNC: 8.7 MG/DL (ref 8.3–10.1)
CHLORIDE SERPL-SCNC: 109 MMOL/L (ref 100–108)
CO2 SERPL-SCNC: 28 MMOL/L (ref 21–32)
CREAT SERPL-MCNC: 2.12 MG/DL (ref 0.6–1.3)
EOSINOPHIL # BLD AUTO: 0.24 THOUSAND/ΜL (ref 0–0.61)
EOSINOPHIL NFR BLD AUTO: 4 % (ref 0–6)
ERYTHROCYTE [DISTWIDTH] IN BLOOD BY AUTOMATED COUNT: 17 % (ref 11.6–15.1)
GFR SERPL CREATININE-BSD FRML MDRD: 29 ML/MIN/1.73SQ M
GLUCOSE SERPL-MCNC: 86 MG/DL (ref 65–140)
HCT VFR BLD AUTO: 30.6 % (ref 36.5–49.3)
HGB BLD-MCNC: 9.6 G/DL (ref 12–17)
IMM GRANULOCYTES # BLD AUTO: 0.02 THOUSAND/UL (ref 0–0.2)
IMM GRANULOCYTES NFR BLD AUTO: 0 % (ref 0–2)
INR PPP: 3.03 (ref 0.84–1.19)
LYMPHOCYTES # BLD AUTO: 1.48 THOUSANDS/ΜL (ref 0.6–4.47)
LYMPHOCYTES NFR BLD AUTO: 24 % (ref 14–44)
MCH RBC QN AUTO: 29.9 PG (ref 26.8–34.3)
MCHC RBC AUTO-ENTMCNC: 31.4 G/DL (ref 31.4–37.4)
MCV RBC AUTO: 95 FL (ref 82–98)
MONOCYTES # BLD AUTO: 1.02 THOUSAND/ΜL (ref 0.17–1.22)
MONOCYTES NFR BLD AUTO: 16 % (ref 4–12)
NEUTROPHILS # BLD AUTO: 3.53 THOUSANDS/ΜL (ref 1.85–7.62)
NEUTS SEG NFR BLD AUTO: 56 % (ref 43–75)
NRBC BLD AUTO-RTO: 0 /100 WBCS
PLATELET # BLD AUTO: 167 THOUSANDS/UL (ref 149–390)
PMV BLD AUTO: 10.6 FL (ref 8.9–12.7)
POTASSIUM SERPL-SCNC: 4.4 MMOL/L (ref 3.5–5.3)
PROTHROMBIN TIME: 31.4 SECONDS (ref 11.6–14.5)
RBC # BLD AUTO: 3.21 MILLION/UL (ref 3.88–5.62)
SODIUM SERPL-SCNC: 144 MMOL/L (ref 136–145)
WBC # BLD AUTO: 6.31 THOUSAND/UL (ref 4.31–10.16)

## 2020-10-07 PROCEDURE — 99232 SBSQ HOSP IP/OBS MODERATE 35: CPT | Performed by: INTERNAL MEDICINE

## 2020-10-07 PROCEDURE — 85610 PROTHROMBIN TIME: CPT | Performed by: FAMILY MEDICINE

## 2020-10-07 PROCEDURE — 80048 BASIC METABOLIC PNL TOTAL CA: CPT | Performed by: FAMILY MEDICINE

## 2020-10-07 PROCEDURE — 85025 COMPLETE CBC W/AUTO DIFF WBC: CPT | Performed by: FAMILY MEDICINE

## 2020-10-07 RX ORDER — WARFARIN SODIUM 2 MG/1
2 TABLET ORAL
Status: DISCONTINUED | OUTPATIENT
Start: 2020-10-07 | End: 2020-10-09 | Stop reason: HOSPADM

## 2020-10-07 RX ADMIN — ATORVASTATIN CALCIUM 40 MG: 40 TABLET, FILM COATED ORAL at 17:13

## 2020-10-07 RX ADMIN — BUMETANIDE 2 MG: 0.25 INJECTION INTRAMUSCULAR; INTRAVENOUS at 17:13

## 2020-10-07 RX ADMIN — BUMETANIDE 2 MG: 0.25 INJECTION INTRAMUSCULAR; INTRAVENOUS at 08:31

## 2020-10-07 RX ADMIN — POTASSIUM CHLORIDE 40 MEQ: 1500 TABLET, EXTENDED RELEASE ORAL at 17:13

## 2020-10-07 RX ADMIN — WARFARIN SODIUM 2 MG: 2 TABLET ORAL at 17:13

## 2020-10-07 RX ADMIN — POTASSIUM CHLORIDE 40 MEQ: 1500 TABLET, EXTENDED RELEASE ORAL at 08:29

## 2020-10-08 LAB
ANION GAP SERPL CALCULATED.3IONS-SCNC: 5 MMOL/L (ref 4–13)
BASOPHILS # BLD AUTO: 0.02 THOUSANDS/ΜL (ref 0–0.1)
BASOPHILS NFR BLD AUTO: 0 % (ref 0–1)
BUN SERPL-MCNC: 46 MG/DL (ref 5–25)
CALCIUM SERPL-MCNC: 8.7 MG/DL (ref 8.3–10.1)
CHLORIDE SERPL-SCNC: 108 MMOL/L (ref 100–108)
CO2 SERPL-SCNC: 30 MMOL/L (ref 21–32)
CREAT SERPL-MCNC: 2.31 MG/DL (ref 0.6–1.3)
EOSINOPHIL # BLD AUTO: 0.26 THOUSAND/ΜL (ref 0–0.61)
EOSINOPHIL NFR BLD AUTO: 4 % (ref 0–6)
ERYTHROCYTE [DISTWIDTH] IN BLOOD BY AUTOMATED COUNT: 16.8 % (ref 11.6–15.1)
GFR SERPL CREATININE-BSD FRML MDRD: 26 ML/MIN/1.73SQ M
GLUCOSE SERPL-MCNC: 82 MG/DL (ref 65–140)
HCT VFR BLD AUTO: 29.8 % (ref 36.5–49.3)
HGB BLD-MCNC: 9.2 G/DL (ref 12–17)
IMM GRANULOCYTES # BLD AUTO: 0.02 THOUSAND/UL (ref 0–0.2)
IMM GRANULOCYTES NFR BLD AUTO: 0 % (ref 0–2)
INR PPP: 2.94 (ref 0.84–1.19)
LYMPHOCYTES # BLD AUTO: 1.19 THOUSANDS/ΜL (ref 0.6–4.47)
LYMPHOCYTES NFR BLD AUTO: 20 % (ref 14–44)
MCH RBC QN AUTO: 29.3 PG (ref 26.8–34.3)
MCHC RBC AUTO-ENTMCNC: 30.9 G/DL (ref 31.4–37.4)
MCV RBC AUTO: 95 FL (ref 82–98)
MONOCYTES # BLD AUTO: 1 THOUSAND/ΜL (ref 0.17–1.22)
MONOCYTES NFR BLD AUTO: 17 % (ref 4–12)
NEUTROPHILS # BLD AUTO: 3.52 THOUSANDS/ΜL (ref 1.85–7.62)
NEUTS SEG NFR BLD AUTO: 59 % (ref 43–75)
NRBC BLD AUTO-RTO: 0 /100 WBCS
PLATELET # BLD AUTO: 166 THOUSANDS/UL (ref 149–390)
PMV BLD AUTO: 10.2 FL (ref 8.9–12.7)
POTASSIUM SERPL-SCNC: 4.4 MMOL/L (ref 3.5–5.3)
PROTHROMBIN TIME: 30.7 SECONDS (ref 11.6–14.5)
RBC # BLD AUTO: 3.14 MILLION/UL (ref 3.88–5.62)
SODIUM SERPL-SCNC: 143 MMOL/L (ref 136–145)
WBC # BLD AUTO: 6.01 THOUSAND/UL (ref 4.31–10.16)

## 2020-10-08 PROCEDURE — 99232 SBSQ HOSP IP/OBS MODERATE 35: CPT | Performed by: INTERNAL MEDICINE

## 2020-10-08 PROCEDURE — 85610 PROTHROMBIN TIME: CPT | Performed by: FAMILY MEDICINE

## 2020-10-08 PROCEDURE — 85025 COMPLETE CBC W/AUTO DIFF WBC: CPT | Performed by: FAMILY MEDICINE

## 2020-10-08 PROCEDURE — 80048 BASIC METABOLIC PNL TOTAL CA: CPT | Performed by: FAMILY MEDICINE

## 2020-10-08 RX ADMIN — ATORVASTATIN CALCIUM 40 MG: 40 TABLET, FILM COATED ORAL at 17:34

## 2020-10-08 RX ADMIN — BUMETANIDE 2 MG: 0.25 INJECTION INTRAMUSCULAR; INTRAVENOUS at 09:35

## 2020-10-08 RX ADMIN — WARFARIN SODIUM 2 MG: 2 TABLET ORAL at 17:34

## 2020-10-08 RX ADMIN — AMLODIPINE BESYLATE 5 MG: 5 TABLET ORAL at 09:34

## 2020-10-08 RX ADMIN — POTASSIUM CHLORIDE 40 MEQ: 1500 TABLET, EXTENDED RELEASE ORAL at 09:34

## 2020-10-08 RX ADMIN — POTASSIUM CHLORIDE 40 MEQ: 1500 TABLET, EXTENDED RELEASE ORAL at 17:34

## 2020-10-08 RX ADMIN — BUMETANIDE 2 MG: 0.25 INJECTION INTRAMUSCULAR; INTRAVENOUS at 17:35

## 2020-10-09 ENCOUNTER — TELEPHONE (OUTPATIENT)
Dept: FAMILY MEDICINE CLINIC | Facility: OTHER | Age: 80
End: 2020-10-09

## 2020-10-09 VITALS
RESPIRATION RATE: 19 BRPM | OXYGEN SATURATION: 94 % | DIASTOLIC BLOOD PRESSURE: 78 MMHG | BODY MASS INDEX: 24.89 KG/M2 | SYSTOLIC BLOOD PRESSURE: 142 MMHG | HEIGHT: 68 IN | WEIGHT: 164.24 LBS | TEMPERATURE: 98.2 F | HEART RATE: 81 BPM

## 2020-10-09 LAB
ANION GAP SERPL CALCULATED.3IONS-SCNC: 6 MMOL/L (ref 4–13)
BUN SERPL-MCNC: 41 MG/DL (ref 5–25)
CALCIUM SERPL-MCNC: 8.5 MG/DL (ref 8.3–10.1)
CHLORIDE SERPL-SCNC: 107 MMOL/L (ref 100–108)
CO2 SERPL-SCNC: 30 MMOL/L (ref 21–32)
CREAT SERPL-MCNC: 2.48 MG/DL (ref 0.6–1.3)
GFR SERPL CREATININE-BSD FRML MDRD: 24 ML/MIN/1.73SQ M
GLUCOSE SERPL-MCNC: 78 MG/DL (ref 65–140)
INR PPP: 2.83 (ref 0.84–1.19)
POTASSIUM SERPL-SCNC: 4.5 MMOL/L (ref 3.5–5.3)
PROTHROMBIN TIME: 29.8 SECONDS (ref 11.6–14.5)
SODIUM SERPL-SCNC: 143 MMOL/L (ref 136–145)

## 2020-10-09 PROCEDURE — 99239 HOSP IP/OBS DSCHRG MGMT >30: CPT | Performed by: INTERNAL MEDICINE

## 2020-10-09 PROCEDURE — 99232 SBSQ HOSP IP/OBS MODERATE 35: CPT | Performed by: INTERNAL MEDICINE

## 2020-10-09 PROCEDURE — 85610 PROTHROMBIN TIME: CPT | Performed by: INTERNAL MEDICINE

## 2020-10-09 PROCEDURE — 80048 BASIC METABOLIC PNL TOTAL CA: CPT | Performed by: INTERNAL MEDICINE

## 2020-10-09 RX ORDER — POTASSIUM CHLORIDE 20 MEQ/1
20 TABLET, EXTENDED RELEASE ORAL 2 TIMES DAILY
Qty: 28 TABLET | Refills: 0 | Status: SHIPPED | OUTPATIENT
Start: 2020-10-09 | End: 2020-11-05 | Stop reason: SDUPTHER

## 2020-10-09 RX ORDER — POTASSIUM CHLORIDE 20 MEQ/1
20 TABLET, EXTENDED RELEASE ORAL 2 TIMES DAILY
Status: DISCONTINUED | OUTPATIENT
Start: 2020-10-09 | End: 2020-10-09 | Stop reason: HOSPADM

## 2020-10-09 RX ORDER — BUMETANIDE 2 MG/1
2 TABLET ORAL 2 TIMES DAILY
Qty: 60 TABLET | Refills: 0 | Status: SHIPPED | OUTPATIENT
Start: 2020-10-09 | End: 2020-10-15

## 2020-10-09 RX ORDER — BUMETANIDE 1 MG/1
2 TABLET ORAL 2 TIMES DAILY
Status: DISCONTINUED | OUTPATIENT
Start: 2020-10-09 | End: 2020-10-09 | Stop reason: HOSPADM

## 2020-10-09 RX ORDER — WARFARIN SODIUM 2 MG/1
TABLET ORAL
Qty: 30 TABLET | Refills: 0 | Status: SHIPPED | OUTPATIENT
Start: 2020-10-09 | End: 2021-04-29 | Stop reason: SDUPTHER

## 2020-10-09 RX ADMIN — BUMETANIDE 2 MG: 1 TABLET ORAL at 11:07

## 2020-10-09 RX ADMIN — POTASSIUM CHLORIDE 40 MEQ: 1500 TABLET, EXTENDED RELEASE ORAL at 08:56

## 2020-10-12 ENCOUNTER — TRANSITIONAL CARE MANAGEMENT (OUTPATIENT)
Dept: FAMILY MEDICINE CLINIC | Facility: CLINIC | Age: 80
End: 2020-10-12

## 2020-10-12 ENCOUNTER — TRANSCRIBE ORDERS (OUTPATIENT)
Dept: LAB | Facility: HOSPITAL | Age: 80
End: 2020-10-12

## 2020-10-12 ENCOUNTER — LAB (OUTPATIENT)
Dept: LAB | Facility: HOSPITAL | Age: 80
End: 2020-10-12
Attending: INTERNAL MEDICINE
Payer: COMMERCIAL

## 2020-10-12 ENCOUNTER — ANTICOAG VISIT (OUTPATIENT)
Dept: CARDIOLOGY CLINIC | Facility: CLINIC | Age: 80
End: 2020-10-12

## 2020-10-12 DIAGNOSIS — Z51.81 ANTICOAGULATION GOAL OF INR 2 TO 3: ICD-10-CM

## 2020-10-12 DIAGNOSIS — Z79.01 ANTICOAGULATION GOAL OF INR 2 TO 3: ICD-10-CM

## 2020-10-15 ENCOUNTER — OFFICE VISIT (OUTPATIENT)
Dept: CARDIOLOGY CLINIC | Facility: CLINIC | Age: 80
End: 2020-10-15
Payer: COMMERCIAL

## 2020-10-15 VITALS
DIASTOLIC BLOOD PRESSURE: 70 MMHG | SYSTOLIC BLOOD PRESSURE: 110 MMHG | BODY MASS INDEX: 26.67 KG/M2 | OXYGEN SATURATION: 93 % | HEIGHT: 68 IN | WEIGHT: 176 LBS | HEART RATE: 66 BPM

## 2020-10-15 DIAGNOSIS — N18.4 STAGE 4 CHRONIC KIDNEY DISEASE (HCC): ICD-10-CM

## 2020-10-15 DIAGNOSIS — I10 HYPERTENSION, UNSPECIFIED TYPE: ICD-10-CM

## 2020-10-15 DIAGNOSIS — E78.2 MIXED HYPERLIPIDEMIA: ICD-10-CM

## 2020-10-15 DIAGNOSIS — Z86.79 HISTORY OF REPAIR OF STANFORD TYPE A DISSECTING ANEURYSM OF THORACIC AORTA: ICD-10-CM

## 2020-10-15 DIAGNOSIS — Z98.890 HISTORY OF REPAIR OF STANFORD TYPE A DISSECTING ANEURYSM OF THORACIC AORTA: ICD-10-CM

## 2020-10-15 DIAGNOSIS — Z86.16 HISTORY OF 2019 NOVEL CORONAVIRUS DISEASE (COVID-19): ICD-10-CM

## 2020-10-15 DIAGNOSIS — I48.0 PAROXYSMAL ATRIAL FIBRILLATION (HCC): ICD-10-CM

## 2020-10-15 DIAGNOSIS — I12.9 BENIGN HYPERTENSION WITH CKD (CHRONIC KIDNEY DISEASE) STAGE III (HCC): Chronic | ICD-10-CM

## 2020-10-15 DIAGNOSIS — I50.32 CHRONIC HEART FAILURE WITH PRESERVED EJECTION FRACTION (HCC): Primary | ICD-10-CM

## 2020-10-15 DIAGNOSIS — N18.30 BENIGN HYPERTENSION WITH CKD (CHRONIC KIDNEY DISEASE) STAGE III (HCC): Chronic | ICD-10-CM

## 2020-10-15 PROCEDURE — 1036F TOBACCO NON-USER: CPT | Performed by: INTERNAL MEDICINE

## 2020-10-15 PROCEDURE — 1160F RVW MEDS BY RX/DR IN RCRD: CPT | Performed by: INTERNAL MEDICINE

## 2020-10-15 PROCEDURE — 99215 OFFICE O/P EST HI 40 MIN: CPT | Performed by: INTERNAL MEDICINE

## 2020-10-15 PROCEDURE — 3078F DIAST BP <80 MM HG: CPT | Performed by: INTERNAL MEDICINE

## 2020-10-15 RX ORDER — BUMETANIDE 2 MG/1
3 TABLET ORAL 2 TIMES DAILY
Qty: 60 TABLET | Refills: 0
Start: 2020-10-15 | End: 2020-10-30

## 2020-10-19 DIAGNOSIS — E87.6 HYPOKALEMIA: ICD-10-CM

## 2020-10-20 ENCOUNTER — ANTICOAG VISIT (OUTPATIENT)
Dept: CARDIOLOGY CLINIC | Facility: CLINIC | Age: 80
End: 2020-10-20

## 2020-10-20 ENCOUNTER — LAB (OUTPATIENT)
Dept: LAB | Facility: HOSPITAL | Age: 80
End: 2020-10-20
Payer: COMMERCIAL

## 2020-10-20 DIAGNOSIS — I50.33 ACUTE ON CHRONIC DIASTOLIC (CONGESTIVE) HEART FAILURE (HCC): ICD-10-CM

## 2020-10-20 DIAGNOSIS — Z79.01 ANTICOAGULATION GOAL OF INR 2 TO 3: ICD-10-CM

## 2020-10-20 DIAGNOSIS — Z51.81 ANTICOAGULATION GOAL OF INR 2 TO 3: ICD-10-CM

## 2020-10-20 DIAGNOSIS — N18.4 ACUTE RENAL FAILURE SUPERIMPOSED ON STAGE 4 CHRONIC KIDNEY DISEASE, UNSPECIFIED ACUTE RENAL FAILURE TYPE (HCC): Chronic | ICD-10-CM

## 2020-10-20 DIAGNOSIS — N17.9 ACUTE RENAL FAILURE SUPERIMPOSED ON STAGE 4 CHRONIC KIDNEY DISEASE, UNSPECIFIED ACUTE RENAL FAILURE TYPE (HCC): Chronic | ICD-10-CM

## 2020-10-20 LAB
ANION GAP SERPL CALCULATED.3IONS-SCNC: 2 MMOL/L (ref 4–13)
BUN SERPL-MCNC: 73 MG/DL (ref 5–25)
CALCIUM SERPL-MCNC: 9 MG/DL (ref 8.3–10.1)
CHLORIDE SERPL-SCNC: 111 MMOL/L (ref 100–108)
CO2 SERPL-SCNC: 30 MMOL/L (ref 21–32)
CREAT SERPL-MCNC: 3.1 MG/DL (ref 0.6–1.3)
GFR SERPL CREATININE-BSD FRML MDRD: 18 ML/MIN/1.73SQ M
GLUCOSE P FAST SERPL-MCNC: 63 MG/DL (ref 65–99)
POTASSIUM SERPL-SCNC: 5 MMOL/L (ref 3.5–5.3)
SODIUM SERPL-SCNC: 143 MMOL/L (ref 136–145)

## 2020-10-20 PROCEDURE — 80048 BASIC METABOLIC PNL TOTAL CA: CPT

## 2020-10-26 RX ORDER — POTASSIUM CHLORIDE 20 MEQ/1
20 TABLET, EXTENDED RELEASE ORAL 2 TIMES DAILY
Qty: 28 TABLET | Refills: 0 | OUTPATIENT
Start: 2020-10-26

## 2020-10-28 ENCOUNTER — OFFICE VISIT (OUTPATIENT)
Dept: FAMILY MEDICINE CLINIC | Facility: CLINIC | Age: 80
End: 2020-10-28

## 2020-10-28 VITALS
DIASTOLIC BLOOD PRESSURE: 70 MMHG | WEIGHT: 190.6 LBS | TEMPERATURE: 98.4 F | HEIGHT: 68 IN | SYSTOLIC BLOOD PRESSURE: 118 MMHG | BODY MASS INDEX: 28.89 KG/M2 | RESPIRATION RATE: 18 BRPM

## 2020-10-28 DIAGNOSIS — Z23 ENCOUNTER FOR IMMUNIZATION: ICD-10-CM

## 2020-10-28 DIAGNOSIS — I50.33 ACUTE ON CHRONIC DIASTOLIC (CONGESTIVE) HEART FAILURE (HCC): Primary | ICD-10-CM

## 2020-10-28 DIAGNOSIS — M54.50 LUMBAR PAIN: ICD-10-CM

## 2020-10-28 PROCEDURE — 90662 IIV NO PRSV INCREASED AG IM: CPT | Performed by: FAMILY MEDICINE

## 2020-10-28 PROCEDURE — G0008 ADMIN INFLUENZA VIRUS VAC: HCPCS | Performed by: FAMILY MEDICINE

## 2020-10-28 PROCEDURE — 1160F RVW MEDS BY RX/DR IN RCRD: CPT | Performed by: FAMILY MEDICINE

## 2020-10-28 PROCEDURE — 99214 OFFICE O/P EST MOD 30 MIN: CPT | Performed by: FAMILY MEDICINE

## 2020-10-30 ENCOUNTER — OFFICE VISIT (OUTPATIENT)
Dept: CARDIOLOGY CLINIC | Facility: CLINIC | Age: 80
End: 2020-10-30
Payer: COMMERCIAL

## 2020-10-30 ENCOUNTER — DOCUMENTATION (OUTPATIENT)
Dept: CARDIOLOGY CLINIC | Facility: CLINIC | Age: 80
End: 2020-10-30

## 2020-10-30 VITALS
SYSTOLIC BLOOD PRESSURE: 108 MMHG | TEMPERATURE: 97.7 F | HEART RATE: 58 BPM | HEIGHT: 68 IN | OXYGEN SATURATION: 97 % | BODY MASS INDEX: 28.22 KG/M2 | WEIGHT: 186.2 LBS | DIASTOLIC BLOOD PRESSURE: 58 MMHG

## 2020-10-30 DIAGNOSIS — I50.20 SYSTOLIC CONGESTIVE HEART FAILURE, UNSPECIFIED HF CHRONICITY (HCC): Primary | ICD-10-CM

## 2020-10-30 DIAGNOSIS — I50.32 CHRONIC HEART FAILURE WITH PRESERVED EJECTION FRACTION (HCC): ICD-10-CM

## 2020-10-30 PROCEDURE — 99215 OFFICE O/P EST HI 40 MIN: CPT | Performed by: NURSE PRACTITIONER

## 2020-10-30 PROCEDURE — 1111F DSCHRG MED/CURRENT MED MERGE: CPT | Performed by: NURSE PRACTITIONER

## 2020-10-30 RX ORDER — BUMETANIDE 2 MG/1
4 TABLET ORAL 2 TIMES DAILY
Qty: 60 TABLET | Refills: 3 | Status: SHIPPED | OUTPATIENT
Start: 2020-10-30 | End: 2020-12-29 | Stop reason: SDUPTHER

## 2020-10-30 RX ORDER — FUROSEMIDE 10 MG/ML
80 INJECTION INTRAMUSCULAR; INTRAVENOUS ONCE
Status: COMPLETED | OUTPATIENT
Start: 2020-10-30 | End: 2020-10-30

## 2020-10-30 RX ADMIN — FUROSEMIDE 80 MG: 10 INJECTION INTRAMUSCULAR; INTRAVENOUS at 14:27

## 2020-11-02 ENCOUNTER — TELEPHONE (OUTPATIENT)
Dept: CARDIOLOGY CLINIC | Facility: CLINIC | Age: 80
End: 2020-11-02

## 2020-11-02 ENCOUNTER — ANTICOAG VISIT (OUTPATIENT)
Dept: CARDIOLOGY CLINIC | Facility: CLINIC | Age: 80
End: 2020-11-02

## 2020-11-02 ENCOUNTER — APPOINTMENT (OUTPATIENT)
Dept: LAB | Facility: HOSPITAL | Age: 80
End: 2020-11-02
Attending: INTERNAL MEDICINE
Payer: COMMERCIAL

## 2020-11-02 DIAGNOSIS — I50.32 CHRONIC HEART FAILURE WITH PRESERVED EJECTION FRACTION (HCC): ICD-10-CM

## 2020-11-02 DIAGNOSIS — Z79.01 ANTICOAGULATION GOAL OF INR 2 TO 3: ICD-10-CM

## 2020-11-02 DIAGNOSIS — Z51.81 ANTICOAGULATION GOAL OF INR 2 TO 3: ICD-10-CM

## 2020-11-02 DIAGNOSIS — I50.20 SYSTOLIC CONGESTIVE HEART FAILURE, UNSPECIFIED HF CHRONICITY (HCC): ICD-10-CM

## 2020-11-02 DIAGNOSIS — I12.9 BENIGN HYPERTENSION WITH CKD (CHRONIC KIDNEY DISEASE) STAGE IV (HCC): ICD-10-CM

## 2020-11-02 DIAGNOSIS — N18.4 BENIGN HYPERTENSION WITH CKD (CHRONIC KIDNEY DISEASE) STAGE IV (HCC): ICD-10-CM

## 2020-11-02 LAB
ALBUMIN SERPL BCP-MCNC: 2.7 G/DL (ref 3.5–5)
ANION GAP SERPL CALCULATED.3IONS-SCNC: 5 MMOL/L (ref 4–13)
BUN SERPL-MCNC: 63 MG/DL (ref 5–25)
CALCIUM ALBUM COR SERPL-MCNC: 9.6 MG/DL (ref 8.3–10.1)
CALCIUM SERPL-MCNC: 8.6 MG/DL (ref 8.3–10.1)
CHLORIDE SERPL-SCNC: 112 MMOL/L (ref 100–108)
CO2 SERPL-SCNC: 28 MMOL/L (ref 21–32)
CREAT SERPL-MCNC: 2.58 MG/DL (ref 0.6–1.3)
CREAT UR-MCNC: 108 MG/DL
ERYTHROCYTE [DISTWIDTH] IN BLOOD BY AUTOMATED COUNT: 15.7 % (ref 11.6–15.1)
GFR SERPL CREATININE-BSD FRML MDRD: 23 ML/MIN/1.73SQ M
GLUCOSE P FAST SERPL-MCNC: 91 MG/DL (ref 65–99)
HCT VFR BLD AUTO: 31.2 % (ref 36.5–49.3)
HGB BLD-MCNC: 9.5 G/DL (ref 12–17)
MCH RBC QN AUTO: 29.5 PG (ref 26.8–34.3)
MCHC RBC AUTO-ENTMCNC: 30.4 G/DL (ref 31.4–37.4)
MCV RBC AUTO: 97 FL (ref 82–98)
NT-PROBNP SERPL-MCNC: 2792 PG/ML
PHOSPHATE SERPL-MCNC: 4.3 MG/DL (ref 2.3–4.1)
PLATELET # BLD AUTO: 172 THOUSANDS/UL (ref 149–390)
PMV BLD AUTO: 10.6 FL (ref 8.9–12.7)
POTASSIUM SERPL-SCNC: 4.6 MMOL/L (ref 3.5–5.3)
PROT UR-MCNC: 40 MG/DL
PROT/CREAT UR: 0.37 MG/G{CREAT} (ref 0–0.1)
PTH-INTACT SERPL-MCNC: 181.3 PG/ML (ref 18.4–80.1)
RBC # BLD AUTO: 3.22 MILLION/UL (ref 3.88–5.62)
SODIUM SERPL-SCNC: 145 MMOL/L (ref 136–145)
WBC # BLD AUTO: 5.31 THOUSAND/UL (ref 4.31–10.16)

## 2020-11-02 PROCEDURE — 83880 ASSAY OF NATRIURETIC PEPTIDE: CPT

## 2020-11-02 PROCEDURE — 82570 ASSAY OF URINE CREATININE: CPT

## 2020-11-02 PROCEDURE — 80069 RENAL FUNCTION PANEL: CPT

## 2020-11-02 PROCEDURE — 85027 COMPLETE CBC AUTOMATED: CPT

## 2020-11-02 PROCEDURE — 83970 ASSAY OF PARATHORMONE: CPT

## 2020-11-02 PROCEDURE — 84156 ASSAY OF PROTEIN URINE: CPT

## 2020-11-03 ENCOUNTER — TELEPHONE (OUTPATIENT)
Dept: CARDIOLOGY CLINIC | Facility: CLINIC | Age: 80
End: 2020-11-03

## 2020-11-03 DIAGNOSIS — N17.9 AKI (ACUTE KIDNEY INJURY) (HCC): ICD-10-CM

## 2020-11-03 DIAGNOSIS — N17.9 ACUTE KIDNEY INJURY SUPERIMPOSED ON CHRONIC KIDNEY DISEASE (HCC): ICD-10-CM

## 2020-11-03 DIAGNOSIS — N18.9 ACUTE KIDNEY INJURY SUPERIMPOSED ON CHRONIC KIDNEY DISEASE (HCC): ICD-10-CM

## 2020-11-03 DIAGNOSIS — I71.01 DISSECTION OF THORACIC AORTA (HCC): ICD-10-CM

## 2020-11-03 DIAGNOSIS — Z98.890 S/P AORTA REPAIR: ICD-10-CM

## 2020-11-03 DIAGNOSIS — E87.6 HYPOKALEMIA: ICD-10-CM

## 2020-11-03 RX ORDER — WARFARIN SODIUM 2 MG/1
TABLET ORAL
Qty: 30 TABLET | Refills: 0 | OUTPATIENT
Start: 2020-11-03

## 2020-11-03 RX ORDER — POTASSIUM CHLORIDE 20 MEQ/1
20 TABLET, EXTENDED RELEASE ORAL 2 TIMES DAILY
Qty: 28 TABLET | Refills: 0 | OUTPATIENT
Start: 2020-11-03

## 2020-11-04 ENCOUNTER — TELEPHONE (OUTPATIENT)
Dept: CARDIOLOGY CLINIC | Facility: CLINIC | Age: 80
End: 2020-11-04

## 2020-11-04 DIAGNOSIS — I50.20 SYSTOLIC CONGESTIVE HEART FAILURE, UNSPECIFIED HF CHRONICITY (HCC): Primary | ICD-10-CM

## 2020-11-04 PROBLEM — N18.4 STAGE 4 CHRONIC KIDNEY DISEASE (HCC): Status: ACTIVE | Noted: 2020-11-04

## 2020-11-04 PROBLEM — N18.9 CHRONIC KIDNEY DISEASE-MINERAL AND BONE DISORDER: Status: ACTIVE | Noted: 2020-11-04

## 2020-11-04 PROBLEM — M89.9 CHRONIC KIDNEY DISEASE-MINERAL AND BONE DISORDER: Status: ACTIVE | Noted: 2020-11-04

## 2020-11-04 PROBLEM — N18.4 BENIGN HYPERTENSION WITH CKD (CHRONIC KIDNEY DISEASE) STAGE IV (HCC): Status: ACTIVE | Noted: 2019-01-09

## 2020-11-04 PROBLEM — E83.9 CHRONIC KIDNEY DISEASE-MINERAL AND BONE DISORDER: Status: ACTIVE | Noted: 2020-11-04

## 2020-11-04 RX ORDER — METOLAZONE 5 MG/1
5 TABLET ORAL AS NEEDED
Qty: 30 TABLET | Refills: 0 | Status: SHIPPED | OUTPATIENT
Start: 2020-11-04 | End: 2020-11-30

## 2020-11-05 ENCOUNTER — TELEPHONE (OUTPATIENT)
Dept: CARDIOLOGY CLINIC | Facility: CLINIC | Age: 80
End: 2020-11-05

## 2020-11-05 DIAGNOSIS — E87.6 HYPOKALEMIA: ICD-10-CM

## 2020-11-05 RX ORDER — POTASSIUM CHLORIDE 20 MEQ/1
20 TABLET, EXTENDED RELEASE ORAL 2 TIMES DAILY
Qty: 60 TABLET | Refills: 5 | Status: SHIPPED | OUTPATIENT
Start: 2020-11-05 | End: 2021-03-29

## 2020-11-06 ENCOUNTER — OFFICE VISIT (OUTPATIENT)
Dept: NEPHROLOGY | Facility: CLINIC | Age: 80
End: 2020-11-06
Payer: COMMERCIAL

## 2020-11-06 ENCOUNTER — TELEPHONE (OUTPATIENT)
Dept: CARDIOLOGY CLINIC | Facility: CLINIC | Age: 80
End: 2020-11-06

## 2020-11-06 VITALS
RESPIRATION RATE: 16 BRPM | HEIGHT: 68 IN | SYSTOLIC BLOOD PRESSURE: 110 MMHG | WEIGHT: 190 LBS | TEMPERATURE: 97.9 F | DIASTOLIC BLOOD PRESSURE: 62 MMHG | BODY MASS INDEX: 28.79 KG/M2 | HEART RATE: 51 BPM

## 2020-11-06 DIAGNOSIS — I50.33 ACUTE ON CHRONIC DIASTOLIC (CONGESTIVE) HEART FAILURE (HCC): Primary | ICD-10-CM

## 2020-11-06 DIAGNOSIS — I12.9 BENIGN HYPERTENSION WITH CKD (CHRONIC KIDNEY DISEASE) STAGE IV (HCC): ICD-10-CM

## 2020-11-06 DIAGNOSIS — I71.01 DISSECTION OF THORACIC AORTA (HCC): ICD-10-CM

## 2020-11-06 DIAGNOSIS — N18.9 CHRONIC KIDNEY DISEASE-MINERAL AND BONE DISORDER: ICD-10-CM

## 2020-11-06 DIAGNOSIS — M89.9 CHRONIC KIDNEY DISEASE-MINERAL AND BONE DISORDER: ICD-10-CM

## 2020-11-06 DIAGNOSIS — N18.4 ACUTE RENAL FAILURE SUPERIMPOSED ON STAGE 4 CHRONIC KIDNEY DISEASE, UNSPECIFIED ACUTE RENAL FAILURE TYPE (HCC): Primary | Chronic | ICD-10-CM

## 2020-11-06 DIAGNOSIS — E83.9 CHRONIC KIDNEY DISEASE-MINERAL AND BONE DISORDER: ICD-10-CM

## 2020-11-06 DIAGNOSIS — N17.9 ACUTE RENAL FAILURE SUPERIMPOSED ON STAGE 4 CHRONIC KIDNEY DISEASE, UNSPECIFIED ACUTE RENAL FAILURE TYPE (HCC): Primary | Chronic | ICD-10-CM

## 2020-11-06 DIAGNOSIS — N18.4 BENIGN HYPERTENSION WITH CKD (CHRONIC KIDNEY DISEASE) STAGE IV (HCC): ICD-10-CM

## 2020-11-06 DIAGNOSIS — N18.4 STAGE 4 CHRONIC KIDNEY DISEASE (HCC): ICD-10-CM

## 2020-11-06 DIAGNOSIS — D63.1 ANEMIA DUE TO STAGE 4 CHRONIC KIDNEY DISEASE (HCC): ICD-10-CM

## 2020-11-06 DIAGNOSIS — N18.4 ANEMIA DUE TO STAGE 4 CHRONIC KIDNEY DISEASE (HCC): ICD-10-CM

## 2020-11-06 DIAGNOSIS — I50.33 ACUTE ON CHRONIC DIASTOLIC (CONGESTIVE) HEART FAILURE (HCC): ICD-10-CM

## 2020-11-06 DIAGNOSIS — E78.5 HYPERLIPIDEMIA, UNSPECIFIED HYPERLIPIDEMIA TYPE: ICD-10-CM

## 2020-11-06 PROCEDURE — 1111F DSCHRG MED/CURRENT MED MERGE: CPT | Performed by: NURSE PRACTITIONER

## 2020-11-06 PROCEDURE — 3078F DIAST BP <80 MM HG: CPT | Performed by: NURSE PRACTITIONER

## 2020-11-06 PROCEDURE — 3074F SYST BP LT 130 MM HG: CPT | Performed by: NURSE PRACTITIONER

## 2020-11-06 PROCEDURE — 99214 OFFICE O/P EST MOD 30 MIN: CPT | Performed by: NURSE PRACTITIONER

## 2020-11-11 ENCOUNTER — ANTICOAG VISIT (OUTPATIENT)
Dept: CARDIOLOGY CLINIC | Facility: CLINIC | Age: 80
End: 2020-11-11

## 2020-11-11 ENCOUNTER — LAB (OUTPATIENT)
Dept: LAB | Facility: HOSPITAL | Age: 80
End: 2020-11-11
Attending: INTERNAL MEDICINE
Payer: COMMERCIAL

## 2020-11-11 DIAGNOSIS — Z51.81 ANTICOAGULATION GOAL OF INR 2 TO 3: ICD-10-CM

## 2020-11-11 DIAGNOSIS — Z79.01 ANTICOAGULATION GOAL OF INR 2 TO 3: ICD-10-CM

## 2020-11-11 DIAGNOSIS — N17.9 ACUTE RENAL FAILURE SUPERIMPOSED ON STAGE 4 CHRONIC KIDNEY DISEASE, UNSPECIFIED ACUTE RENAL FAILURE TYPE (HCC): Chronic | ICD-10-CM

## 2020-11-11 DIAGNOSIS — N18.4 ACUTE RENAL FAILURE SUPERIMPOSED ON STAGE 4 CHRONIC KIDNEY DISEASE, UNSPECIFIED ACUTE RENAL FAILURE TYPE (HCC): Chronic | ICD-10-CM

## 2020-11-11 LAB
ANION GAP SERPL CALCULATED.3IONS-SCNC: 4 MMOL/L (ref 4–13)
BASOPHILS # BLD AUTO: 0.01 THOUSANDS/ΜL (ref 0–0.1)
BASOPHILS NFR BLD AUTO: 0 % (ref 0–1)
BUN SERPL-MCNC: 61 MG/DL (ref 5–25)
CALCIUM SERPL-MCNC: 9 MG/DL (ref 8.3–10.1)
CHLORIDE SERPL-SCNC: 105 MMOL/L (ref 100–108)
CO2 SERPL-SCNC: 34 MMOL/L (ref 21–32)
CREAT SERPL-MCNC: 2.78 MG/DL (ref 0.6–1.3)
CREAT UR-MCNC: 33.8 MG/DL
EOSINOPHIL # BLD AUTO: 0.17 THOUSAND/ΜL (ref 0–0.61)
EOSINOPHIL NFR BLD AUTO: 4 % (ref 0–6)
ERYTHROCYTE [DISTWIDTH] IN BLOOD BY AUTOMATED COUNT: 14.7 % (ref 11.6–15.1)
GFR SERPL CREATININE-BSD FRML MDRD: 21 ML/MIN/1.73SQ M
GLUCOSE SERPL-MCNC: 120 MG/DL (ref 65–140)
HCT VFR BLD AUTO: 32.1 % (ref 36.5–49.3)
HGB BLD-MCNC: 10 G/DL (ref 12–17)
IMM GRANULOCYTES # BLD AUTO: 0.01 THOUSAND/UL (ref 0–0.2)
IMM GRANULOCYTES NFR BLD AUTO: 0 % (ref 0–2)
LYMPHOCYTES # BLD AUTO: 0.84 THOUSANDS/ΜL (ref 0.6–4.47)
LYMPHOCYTES NFR BLD AUTO: 18 % (ref 14–44)
MAGNESIUM SERPL-MCNC: 2.4 MG/DL (ref 1.6–2.6)
MCH RBC QN AUTO: 29.2 PG (ref 26.8–34.3)
MCHC RBC AUTO-ENTMCNC: 31.2 G/DL (ref 31.4–37.4)
MCV RBC AUTO: 94 FL (ref 82–98)
MONOCYTES # BLD AUTO: 0.92 THOUSAND/ΜL (ref 0.17–1.22)
MONOCYTES NFR BLD AUTO: 20 % (ref 4–12)
NEUTROPHILS # BLD AUTO: 2.67 THOUSANDS/ΜL (ref 1.85–7.62)
NEUTS SEG NFR BLD AUTO: 58 % (ref 43–75)
NRBC BLD AUTO-RTO: 0 /100 WBCS
PHOSPHATE SERPL-MCNC: 4.3 MG/DL (ref 2.3–4.1)
PLATELET # BLD AUTO: 123 THOUSANDS/UL (ref 149–390)
PMV BLD AUTO: 11.5 FL (ref 8.9–12.7)
POTASSIUM SERPL-SCNC: 3.6 MMOL/L (ref 3.5–5.3)
PROT UR-MCNC: 6 MG/DL
PROT/CREAT UR: 0.18 MG/G{CREAT} (ref 0–0.1)
PTH-INTACT SERPL-MCNC: 145.2 PG/ML (ref 18.4–80.1)
RBC # BLD AUTO: 3.42 MILLION/UL (ref 3.88–5.62)
SODIUM SERPL-SCNC: 143 MMOL/L (ref 136–145)
WBC # BLD AUTO: 4.62 THOUSAND/UL (ref 4.31–10.16)

## 2020-11-11 PROCEDURE — 83970 ASSAY OF PARATHORMONE: CPT

## 2020-11-11 PROCEDURE — 83735 ASSAY OF MAGNESIUM: CPT

## 2020-11-11 PROCEDURE — 85025 COMPLETE CBC W/AUTO DIFF WBC: CPT

## 2020-11-11 PROCEDURE — 80048 BASIC METABOLIC PNL TOTAL CA: CPT

## 2020-11-11 PROCEDURE — 84100 ASSAY OF PHOSPHORUS: CPT

## 2020-11-11 PROCEDURE — 84156 ASSAY OF PROTEIN URINE: CPT

## 2020-11-11 PROCEDURE — 82570 ASSAY OF URINE CREATININE: CPT

## 2020-11-13 ENCOUNTER — OFFICE VISIT (OUTPATIENT)
Dept: CARDIOLOGY CLINIC | Facility: CLINIC | Age: 80
End: 2020-11-13
Payer: COMMERCIAL

## 2020-11-13 VITALS
TEMPERATURE: 97.1 F | SYSTOLIC BLOOD PRESSURE: 122 MMHG | WEIGHT: 166 LBS | BODY MASS INDEX: 26.06 KG/M2 | OXYGEN SATURATION: 98 % | HEART RATE: 54 BPM | HEIGHT: 67 IN | DIASTOLIC BLOOD PRESSURE: 68 MMHG

## 2020-11-13 DIAGNOSIS — I48.0 PAROXYSMAL ATRIAL FIBRILLATION (HCC): ICD-10-CM

## 2020-11-13 DIAGNOSIS — Z86.16 HISTORY OF 2019 NOVEL CORONAVIRUS DISEASE (COVID-19): ICD-10-CM

## 2020-11-13 DIAGNOSIS — E78.2 MIXED HYPERLIPIDEMIA: ICD-10-CM

## 2020-11-13 DIAGNOSIS — I50.32 CHRONIC HEART FAILURE WITH PRESERVED EJECTION FRACTION (HCC): Primary | ICD-10-CM

## 2020-11-13 DIAGNOSIS — I10 HYPERTENSION, UNSPECIFIED TYPE: ICD-10-CM

## 2020-11-13 DIAGNOSIS — Z98.890 HISTORY OF REPAIR OF STANFORD TYPE A DISSECTING ANEURYSM OF THORACIC AORTA: ICD-10-CM

## 2020-11-13 DIAGNOSIS — Z86.79 HISTORY OF REPAIR OF STANFORD TYPE A DISSECTING ANEURYSM OF THORACIC AORTA: ICD-10-CM

## 2020-11-13 DIAGNOSIS — N18.4 STAGE 4 CHRONIC KIDNEY DISEASE (HCC): ICD-10-CM

## 2020-11-13 PROCEDURE — 1036F TOBACCO NON-USER: CPT | Performed by: INTERNAL MEDICINE

## 2020-11-13 PROCEDURE — 99215 OFFICE O/P EST HI 40 MIN: CPT | Performed by: INTERNAL MEDICINE

## 2020-11-13 PROCEDURE — 1160F RVW MEDS BY RX/DR IN RCRD: CPT | Performed by: INTERNAL MEDICINE

## 2020-11-19 ENCOUNTER — LAB (OUTPATIENT)
Dept: LAB | Facility: HOSPITAL | Age: 80
End: 2020-11-19
Attending: INTERNAL MEDICINE
Payer: COMMERCIAL

## 2020-11-19 ENCOUNTER — ANTICOAG VISIT (OUTPATIENT)
Dept: CARDIOLOGY CLINIC | Facility: CLINIC | Age: 80
End: 2020-11-19

## 2020-11-19 DIAGNOSIS — Z51.81 ANTICOAGULATION GOAL OF INR 2 TO 3: ICD-10-CM

## 2020-11-19 DIAGNOSIS — Z79.01 ANTICOAGULATION GOAL OF INR 2 TO 3: ICD-10-CM

## 2020-11-30 DIAGNOSIS — I50.20 SYSTOLIC CONGESTIVE HEART FAILURE, UNSPECIFIED HF CHRONICITY (HCC): ICD-10-CM

## 2020-11-30 RX ORDER — METOLAZONE 5 MG/1
5 TABLET ORAL AS NEEDED
Qty: 30 TABLET | Refills: 0 | Status: SHIPPED | OUTPATIENT
Start: 2020-11-30 | End: 2020-12-27

## 2020-12-01 ENCOUNTER — ANTICOAG VISIT (OUTPATIENT)
Dept: CARDIOLOGY CLINIC | Facility: CLINIC | Age: 80
End: 2020-12-01

## 2020-12-01 ENCOUNTER — LAB (OUTPATIENT)
Dept: LAB | Facility: HOSPITAL | Age: 80
End: 2020-12-01
Attending: INTERNAL MEDICINE
Payer: COMMERCIAL

## 2020-12-01 DIAGNOSIS — Z79.01 ANTICOAGULATION GOAL OF INR 2 TO 3: ICD-10-CM

## 2020-12-01 DIAGNOSIS — Z51.81 ANTICOAGULATION GOAL OF INR 2 TO 3: ICD-10-CM

## 2020-12-10 ENCOUNTER — OFFICE VISIT (OUTPATIENT)
Dept: CARDIOLOGY CLINIC | Facility: CLINIC | Age: 80
End: 2020-12-10
Payer: COMMERCIAL

## 2020-12-10 ENCOUNTER — TELEPHONE (OUTPATIENT)
Dept: HEMATOLOGY ONCOLOGY | Facility: CLINIC | Age: 80
End: 2020-12-10

## 2020-12-10 VITALS
OXYGEN SATURATION: 93 % | BODY MASS INDEX: 25.88 KG/M2 | WEIGHT: 164.9 LBS | SYSTOLIC BLOOD PRESSURE: 128 MMHG | HEIGHT: 67 IN | HEART RATE: 61 BPM | DIASTOLIC BLOOD PRESSURE: 70 MMHG

## 2020-12-10 DIAGNOSIS — I12.9 BENIGN HYPERTENSION WITH CKD (CHRONIC KIDNEY DISEASE) STAGE IV (HCC): ICD-10-CM

## 2020-12-10 DIAGNOSIS — I10 BENIGN ESSENTIAL HYPERTENSION: Chronic | ICD-10-CM

## 2020-12-10 DIAGNOSIS — I71.01 DISSECTION OF THORACIC AORTA (HCC): Primary | ICD-10-CM

## 2020-12-10 DIAGNOSIS — I48.0 PAROXYSMAL ATRIAL FIBRILLATION (HCC): ICD-10-CM

## 2020-12-10 DIAGNOSIS — I50.33 ACUTE ON CHRONIC DIASTOLIC (CONGESTIVE) HEART FAILURE (HCC): ICD-10-CM

## 2020-12-10 DIAGNOSIS — N18.4 BENIGN HYPERTENSION WITH CKD (CHRONIC KIDNEY DISEASE) STAGE IV (HCC): ICD-10-CM

## 2020-12-10 PROCEDURE — 99214 OFFICE O/P EST MOD 30 MIN: CPT | Performed by: INTERNAL MEDICINE

## 2020-12-10 PROCEDURE — 93000 ELECTROCARDIOGRAM COMPLETE: CPT | Performed by: INTERNAL MEDICINE

## 2020-12-11 ENCOUNTER — OFFICE VISIT (OUTPATIENT)
Dept: HEMATOLOGY ONCOLOGY | Facility: CLINIC | Age: 80
End: 2020-12-11
Payer: COMMERCIAL

## 2020-12-11 VITALS
TEMPERATURE: 97.1 F | HEIGHT: 67 IN | OXYGEN SATURATION: 99 % | HEART RATE: 55 BPM | SYSTOLIC BLOOD PRESSURE: 130 MMHG | WEIGHT: 165.2 LBS | RESPIRATION RATE: 16 BRPM | BODY MASS INDEX: 25.93 KG/M2 | DIASTOLIC BLOOD PRESSURE: 72 MMHG

## 2020-12-11 DIAGNOSIS — D47.2 MONOCLONAL GAMMOPATHY: Primary | ICD-10-CM

## 2020-12-11 PROBLEM — Z86.16 HISTORY OF 2019 NOVEL CORONAVIRUS DISEASE (COVID-19): Status: RESOLVED | Noted: 2020-07-15 | Resolved: 2020-12-11

## 2020-12-11 PROCEDURE — 99213 OFFICE O/P EST LOW 20 MIN: CPT | Performed by: INTERNAL MEDICINE

## 2020-12-14 ENCOUNTER — ANTICOAG VISIT (OUTPATIENT)
Dept: CARDIOLOGY CLINIC | Facility: CLINIC | Age: 80
End: 2020-12-14

## 2020-12-14 ENCOUNTER — APPOINTMENT (OUTPATIENT)
Dept: LAB | Facility: HOSPITAL | Age: 80
End: 2020-12-14
Attending: INTERNAL MEDICINE
Payer: COMMERCIAL

## 2020-12-14 ENCOUNTER — OFFICE VISIT (OUTPATIENT)
Dept: NEPHROLOGY | Facility: CLINIC | Age: 80
End: 2020-12-14
Payer: COMMERCIAL

## 2020-12-14 VITALS
BODY MASS INDEX: 26.53 KG/M2 | HEIGHT: 67 IN | SYSTOLIC BLOOD PRESSURE: 120 MMHG | DIASTOLIC BLOOD PRESSURE: 78 MMHG | HEART RATE: 62 BPM | WEIGHT: 169 LBS

## 2020-12-14 DIAGNOSIS — Z51.81 ANTICOAGULATION GOAL OF INR 2 TO 3: ICD-10-CM

## 2020-12-14 DIAGNOSIS — E78.5 HYPERLIPIDEMIA, UNSPECIFIED HYPERLIPIDEMIA TYPE: ICD-10-CM

## 2020-12-14 DIAGNOSIS — I50.33 ACUTE ON CHRONIC DIASTOLIC (CONGESTIVE) HEART FAILURE (HCC): ICD-10-CM

## 2020-12-14 DIAGNOSIS — Z79.01 ANTICOAGULATION GOAL OF INR 2 TO 3: ICD-10-CM

## 2020-12-14 DIAGNOSIS — M89.9 CHRONIC KIDNEY DISEASE-MINERAL AND BONE DISORDER: ICD-10-CM

## 2020-12-14 DIAGNOSIS — N18.9 CHRONIC KIDNEY DISEASE-MINERAL AND BONE DISORDER: ICD-10-CM

## 2020-12-14 DIAGNOSIS — N18.4 STAGE 4 CHRONIC KIDNEY DISEASE (HCC): Primary | ICD-10-CM

## 2020-12-14 DIAGNOSIS — N18.4 BENIGN HYPERTENSION WITH CKD (CHRONIC KIDNEY DISEASE) STAGE IV (HCC): ICD-10-CM

## 2020-12-14 DIAGNOSIS — N25.81 SECONDARY HYPERPARATHYROIDISM OF RENAL ORIGIN (HCC): ICD-10-CM

## 2020-12-14 DIAGNOSIS — I71.01 DISSECTION OF THORACIC AORTA (HCC): ICD-10-CM

## 2020-12-14 DIAGNOSIS — E83.9 CHRONIC KIDNEY DISEASE-MINERAL AND BONE DISORDER: ICD-10-CM

## 2020-12-14 DIAGNOSIS — I12.9 BENIGN HYPERTENSION WITH CKD (CHRONIC KIDNEY DISEASE) STAGE IV (HCC): ICD-10-CM

## 2020-12-14 PROCEDURE — 3074F SYST BP LT 130 MM HG: CPT | Performed by: INTERNAL MEDICINE

## 2020-12-14 PROCEDURE — 1036F TOBACCO NON-USER: CPT | Performed by: INTERNAL MEDICINE

## 2020-12-14 PROCEDURE — 1160F RVW MEDS BY RX/DR IN RCRD: CPT | Performed by: INTERNAL MEDICINE

## 2020-12-14 PROCEDURE — 99214 OFFICE O/P EST MOD 30 MIN: CPT | Performed by: INTERNAL MEDICINE

## 2020-12-14 PROCEDURE — 3078F DIAST BP <80 MM HG: CPT | Performed by: INTERNAL MEDICINE

## 2020-12-26 DIAGNOSIS — I50.20 SYSTOLIC CONGESTIVE HEART FAILURE, UNSPECIFIED HF CHRONICITY (HCC): ICD-10-CM

## 2020-12-27 RX ORDER — METOLAZONE 5 MG/1
5 TABLET ORAL AS NEEDED
Qty: 30 TABLET | Refills: 0 | Status: SHIPPED | OUTPATIENT
Start: 2020-12-27 | End: 2021-01-22

## 2020-12-28 DIAGNOSIS — I10 HYPERTENSION, UNSPECIFIED TYPE: ICD-10-CM

## 2020-12-29 DIAGNOSIS — I50.32 CHRONIC HEART FAILURE WITH PRESERVED EJECTION FRACTION (HCC): ICD-10-CM

## 2020-12-29 DIAGNOSIS — I50.20 SYSTOLIC CONGESTIVE HEART FAILURE, UNSPECIFIED HF CHRONICITY (HCC): ICD-10-CM

## 2020-12-29 RX ORDER — BUMETANIDE 2 MG/1
4 TABLET ORAL 2 TIMES DAILY
Qty: 60 TABLET | Refills: 3 | Status: ON HOLD | OUTPATIENT
Start: 2020-12-29 | End: 2021-02-03

## 2020-12-29 RX ORDER — AMLODIPINE BESYLATE 5 MG/1
TABLET ORAL
Qty: 30 TABLET | Refills: 3 | Status: SHIPPED | OUTPATIENT
Start: 2020-12-29 | End: 2021-02-17 | Stop reason: HOSPADM

## 2021-01-01 ENCOUNTER — PREP FOR PROCEDURE (OUTPATIENT)
Dept: INTERVENTIONAL RADIOLOGY/VASCULAR | Facility: CLINIC | Age: 81
End: 2021-01-01

## 2021-01-01 ENCOUNTER — TRANSITIONAL CARE MANAGEMENT (OUTPATIENT)
Dept: FAMILY MEDICINE CLINIC | Facility: CLINIC | Age: 81
End: 2021-01-01

## 2021-01-01 ENCOUNTER — HOSPITAL ENCOUNTER (EMERGENCY)
Facility: HOSPITAL | Age: 81
Discharge: HOME | End: 2021-09-29
Attending: EMERGENCY MEDICINE
Payer: COMMERCIAL

## 2021-01-01 ENCOUNTER — ANTICOAG VISIT (OUTPATIENT)
Dept: CARDIOLOGY CLINIC | Facility: CLINIC | Age: 81
End: 2021-01-01

## 2021-01-01 ENCOUNTER — HOSPITAL ENCOUNTER (EMERGENCY)
Facility: HOSPITAL | Age: 81
Discharge: HOME | End: 2021-09-17
Attending: EMERGENCY MEDICINE | Admitting: EMERGENCY MEDICINE
Payer: COMMERCIAL

## 2021-01-01 ENCOUNTER — APPOINTMENT (EMERGENCY)
Dept: RADIOLOGY | Facility: HOSPITAL | Age: 81
DRG: 813 | End: 2021-01-01
Payer: COMMERCIAL

## 2021-01-01 ENCOUNTER — NURSE TRIAGE (OUTPATIENT)
Dept: OTHER | Facility: OTHER | Age: 81
End: 2021-01-01

## 2021-01-01 ENCOUNTER — ANESTHESIA EVENT (INPATIENT)
Dept: GASTROENTEROLOGY | Facility: HOSPITAL | Age: 81
DRG: 813 | End: 2021-01-01
Payer: COMMERCIAL

## 2021-01-01 ENCOUNTER — PATIENT OUTREACH (OUTPATIENT)
Dept: FAMILY MEDICINE CLINIC | Facility: CLINIC | Age: 81
End: 2021-01-01

## 2021-01-01 ENCOUNTER — APPOINTMENT (OUTPATIENT)
Dept: LAB | Facility: CLINIC | Age: 81
End: 2021-01-01
Payer: COMMERCIAL

## 2021-01-01 ENCOUNTER — TELEPHONE (OUTPATIENT)
Dept: HEMATOLOGY ONCOLOGY | Facility: CLINIC | Age: 81
End: 2021-01-01

## 2021-01-01 ENCOUNTER — OFFICE VISIT (OUTPATIENT)
Dept: CARDIOLOGY CLINIC | Facility: CLINIC | Age: 81
End: 2021-01-01
Payer: COMMERCIAL

## 2021-01-01 ENCOUNTER — HOSPITAL ENCOUNTER (OUTPATIENT)
Dept: RADIOLOGY | Facility: HOSPITAL | Age: 81
Discharge: HOME | End: 2021-09-03
Attending: RADIOLOGY
Payer: COMMERCIAL

## 2021-01-01 ENCOUNTER — TELEPHONE (OUTPATIENT)
Dept: FAMILY MEDICINE CLINIC | Facility: CLINIC | Age: 81
End: 2021-01-01

## 2021-01-01 ENCOUNTER — HOSPITAL ENCOUNTER (OUTPATIENT)
Dept: RADIOLOGY | Facility: HOSPITAL | Age: 81
Discharge: HOME | End: 2021-09-30
Attending: RADIOLOGY | Admitting: INTERNAL MEDICINE
Payer: COMMERCIAL

## 2021-01-01 ENCOUNTER — APPOINTMENT (EMERGENCY)
Dept: RADIOLOGY | Facility: HOSPITAL | Age: 81
End: 2021-01-01
Payer: COMMERCIAL

## 2021-01-01 ENCOUNTER — TELEPHONE (OUTPATIENT)
Dept: INTERVENTIONAL RADIOLOGY/VASCULAR | Facility: HOSPITAL | Age: 81
End: 2021-01-01

## 2021-01-01 ENCOUNTER — TELEPHONE (OUTPATIENT)
Dept: CARDIOLOGY CLINIC | Facility: CLINIC | Age: 81
End: 2021-01-01

## 2021-01-01 ENCOUNTER — HOSPITAL ENCOUNTER (OUTPATIENT)
Dept: INTERVENTIONAL RADIOLOGY/VASCULAR | Facility: HOSPITAL | Age: 81
Discharge: HOME | End: 2021-09-29
Attending: RADIOLOGY

## 2021-01-01 ENCOUNTER — ANESTHESIA (INPATIENT)
Dept: GASTROENTEROLOGY | Facility: HOSPITAL | Age: 81
DRG: 813 | End: 2021-01-01
Payer: COMMERCIAL

## 2021-01-01 ENCOUNTER — HOSPITAL ENCOUNTER (INPATIENT)
Facility: HOSPITAL | Age: 81
LOS: 5 days | Discharge: HOME | DRG: 813 | End: 2021-12-22
Attending: EMERGENCY MEDICINE | Admitting: HOSPITALIST
Payer: COMMERCIAL

## 2021-01-01 ENCOUNTER — APPOINTMENT (INPATIENT)
Dept: GASTROENTEROLOGY | Facility: HOSPITAL | Age: 81
DRG: 813 | End: 2021-01-01
Attending: INTERNAL MEDICINE
Payer: COMMERCIAL

## 2021-01-01 ENCOUNTER — OFFICE VISIT (OUTPATIENT)
Dept: FAMILY MEDICINE CLINIC | Facility: CLINIC | Age: 81
End: 2021-01-01
Payer: COMMERCIAL

## 2021-01-01 ENCOUNTER — TELEPHONE (OUTPATIENT)
Dept: OBGYN CLINIC | Facility: HOSPITAL | Age: 81
End: 2021-01-01

## 2021-01-01 ENCOUNTER — VBI (OUTPATIENT)
Dept: ADMINISTRATIVE | Facility: OTHER | Age: 81
End: 2021-01-01

## 2021-01-01 ENCOUNTER — APPOINTMENT (INPATIENT)
Dept: DIALYSIS | Facility: HOSPITAL | Age: 81
DRG: 813 | End: 2021-01-01
Attending: INTERNAL MEDICINE
Payer: COMMERCIAL

## 2021-01-01 ENCOUNTER — RA CDI HCC (OUTPATIENT)
Dept: OTHER | Facility: HOSPITAL | Age: 81
End: 2021-01-01

## 2021-01-01 ENCOUNTER — OFFICE VISIT (OUTPATIENT)
Dept: OBGYN CLINIC | Facility: HOSPITAL | Age: 81
End: 2021-01-01
Payer: COMMERCIAL

## 2021-01-01 VITALS
RESPIRATION RATE: 18 BRPM | SYSTOLIC BLOOD PRESSURE: 103 MMHG | HEIGHT: 67 IN | WEIGHT: 174.6 LBS | HEART RATE: 80 BPM | DIASTOLIC BLOOD PRESSURE: 61 MMHG | OXYGEN SATURATION: 95 % | BODY MASS INDEX: 27.4 KG/M2 | TEMPERATURE: 96.8 F

## 2021-01-01 VITALS
SYSTOLIC BLOOD PRESSURE: 112 MMHG | RESPIRATION RATE: 20 BRPM | TEMPERATURE: 97.2 F | OXYGEN SATURATION: 96 % | HEART RATE: 72 BPM | DIASTOLIC BLOOD PRESSURE: 69 MMHG

## 2021-01-01 VITALS
DIASTOLIC BLOOD PRESSURE: 66 MMHG | OXYGEN SATURATION: 97 % | HEART RATE: 97 BPM | HEIGHT: 67 IN | SYSTOLIC BLOOD PRESSURE: 107 MMHG | WEIGHT: 172 LBS | BODY MASS INDEX: 27 KG/M2 | TEMPERATURE: 97.4 F | RESPIRATION RATE: 16 BRPM

## 2021-01-01 VITALS
DIASTOLIC BLOOD PRESSURE: 59 MMHG | HEART RATE: 70 BPM | RESPIRATION RATE: 18 BRPM | SYSTOLIC BLOOD PRESSURE: 124 MMHG | OXYGEN SATURATION: 99 % | TEMPERATURE: 98.1 F

## 2021-01-01 VITALS
RESPIRATION RATE: 16 BRPM | TEMPERATURE: 98.4 F | DIASTOLIC BLOOD PRESSURE: 60 MMHG | BODY MASS INDEX: 28.25 KG/M2 | WEIGHT: 180 LBS | HEIGHT: 67 IN | OXYGEN SATURATION: 98 % | HEART RATE: 93 BPM | SYSTOLIC BLOOD PRESSURE: 130 MMHG

## 2021-01-01 VITALS
BODY MASS INDEX: 27.06 KG/M2 | TEMPERATURE: 97.6 F | HEIGHT: 67 IN | WEIGHT: 172.38 LBS | OXYGEN SATURATION: 95 % | RESPIRATION RATE: 18 BRPM | SYSTOLIC BLOOD PRESSURE: 100 MMHG | HEART RATE: 100 BPM | DIASTOLIC BLOOD PRESSURE: 70 MMHG

## 2021-01-01 VITALS
TEMPERATURE: 98 F | BODY MASS INDEX: 27.53 KG/M2 | RESPIRATION RATE: 16 BRPM | HEIGHT: 67 IN | WEIGHT: 175.4 LBS | DIASTOLIC BLOOD PRESSURE: 60 MMHG | SYSTOLIC BLOOD PRESSURE: 90 MMHG | OXYGEN SATURATION: 98 % | HEART RATE: 61 BPM

## 2021-01-01 VITALS
HEART RATE: 72 BPM | DIASTOLIC BLOOD PRESSURE: 56 MMHG | SYSTOLIC BLOOD PRESSURE: 120 MMHG | OXYGEN SATURATION: 97 % | TEMPERATURE: 98 F | RESPIRATION RATE: 20 BRPM

## 2021-01-01 VITALS
RESPIRATION RATE: 18 BRPM | HEART RATE: 78 BPM | TEMPERATURE: 97.5 F | SYSTOLIC BLOOD PRESSURE: 108 MMHG | OXYGEN SATURATION: 98 % | BODY MASS INDEX: 27.1 KG/M2 | WEIGHT: 173 LBS | DIASTOLIC BLOOD PRESSURE: 63 MMHG

## 2021-01-01 VITALS
HEIGHT: 67 IN | BODY MASS INDEX: 27.84 KG/M2 | SYSTOLIC BLOOD PRESSURE: 127 MMHG | DIASTOLIC BLOOD PRESSURE: 74 MMHG | HEART RATE: 88 BPM | WEIGHT: 177.4 LBS

## 2021-01-01 VITALS
DIASTOLIC BLOOD PRESSURE: 50 MMHG | BODY MASS INDEX: 27.72 KG/M2 | SYSTOLIC BLOOD PRESSURE: 106 MMHG | HEIGHT: 67 IN | HEART RATE: 83 BPM | WEIGHT: 176.6 LBS | OXYGEN SATURATION: 95 %

## 2021-01-01 DIAGNOSIS — N17.9 AKI (ACUTE KIDNEY INJURY) (HCC): ICD-10-CM

## 2021-01-01 DIAGNOSIS — M75.102 LEFT ROTATOR CUFF TEAR ARTHROPATHY: Primary | ICD-10-CM

## 2021-01-01 DIAGNOSIS — I48.0 PAROXYSMAL ATRIAL FIBRILLATION (HCC): ICD-10-CM

## 2021-01-01 DIAGNOSIS — I71.01 DISSECTION OF THORACIC AORTA (HCC): ICD-10-CM

## 2021-01-01 DIAGNOSIS — I11.0 HYPERTENSIVE HEART DISEASE WITH HEART FAILURE (HCC): ICD-10-CM

## 2021-01-01 DIAGNOSIS — D63.1 ANEMIA DUE TO CHRONIC KIDNEY DISEASE, UNSPECIFIED CKD STAGE: ICD-10-CM

## 2021-01-01 DIAGNOSIS — M25.512 LEFT SHOULDER PAIN: Primary | ICD-10-CM

## 2021-01-01 DIAGNOSIS — Z51.81 ANTICOAGULATION GOAL OF INR 2 TO 3: ICD-10-CM

## 2021-01-01 DIAGNOSIS — L30.4 INTERTRIGO: Primary | ICD-10-CM

## 2021-01-01 DIAGNOSIS — N18.9 CHRONIC KIDNEY DISEASE-MINERAL AND BONE DISORDER: Primary | ICD-10-CM

## 2021-01-01 DIAGNOSIS — N18.6 ESRD (END STAGE RENAL DISEASE) (HCC): ICD-10-CM

## 2021-01-01 DIAGNOSIS — M25.512 LEFT SHOULDER PAIN: ICD-10-CM

## 2021-01-01 DIAGNOSIS — I10 HYPERTENSION, UNSPECIFIED TYPE: ICD-10-CM

## 2021-01-01 DIAGNOSIS — Z98.890 S/P AORTA REPAIR: ICD-10-CM

## 2021-01-01 DIAGNOSIS — E83.9 CHRONIC KIDNEY DISEASE-MINERAL AND BONE DISORDER: ICD-10-CM

## 2021-01-01 DIAGNOSIS — Z79.01 ANTICOAGULATION GOAL OF INR 2 TO 3: ICD-10-CM

## 2021-01-01 DIAGNOSIS — K92.2 GASTROINTESTINAL HEMORRHAGE, UNSPECIFIED GASTROINTESTINAL HEMORRHAGE TYPE: ICD-10-CM

## 2021-01-01 DIAGNOSIS — R00.2 PALPITATIONS: Primary | ICD-10-CM

## 2021-01-01 DIAGNOSIS — N17.9 ACUTE KIDNEY INJURY SUPERIMPOSED ON CHRONIC KIDNEY DISEASE (HCC): ICD-10-CM

## 2021-01-01 DIAGNOSIS — T82.898A INADEQUATE FLOW OF DIALYSIS ARTERIOVENOUS FISTULA, INITIAL ENCOUNTER (HCC): ICD-10-CM

## 2021-01-01 DIAGNOSIS — N18.6 ESRD (END STAGE RENAL DISEASE) (HCC): Primary | ICD-10-CM

## 2021-01-01 DIAGNOSIS — Z71.89 COMPLEX CARE COORDINATION: Primary | ICD-10-CM

## 2021-01-01 DIAGNOSIS — N18.6 END STAGE RENAL DISEASE (HCC): ICD-10-CM

## 2021-01-01 DIAGNOSIS — Z78.9 NEED FOR FOLLOW-UP BY SOCIAL WORKER: Primary | ICD-10-CM

## 2021-01-01 DIAGNOSIS — N18.6 ESRD ON HEMODIALYSIS (HCC): ICD-10-CM

## 2021-01-01 DIAGNOSIS — Z09 HOSPITAL DISCHARGE FOLLOW-UP: Primary | ICD-10-CM

## 2021-01-01 DIAGNOSIS — N18.9 CHRONIC KIDNEY DISEASE-MINERAL AND BONE DISORDER: ICD-10-CM

## 2021-01-01 DIAGNOSIS — B37.2 CANDIDAL INTERTRIGO: Primary | ICD-10-CM

## 2021-01-01 DIAGNOSIS — N18.6 ESRD (END STAGE RENAL DISEASE) ON DIALYSIS (HCC): Primary | ICD-10-CM

## 2021-01-01 DIAGNOSIS — M89.9 CHRONIC KIDNEY DISEASE-MINERAL AND BONE DISORDER: ICD-10-CM

## 2021-01-01 DIAGNOSIS — L30.4 INTERTRIGO: ICD-10-CM

## 2021-01-01 DIAGNOSIS — E78.5 HYPERLIPIDEMIA, UNSPECIFIED HYPERLIPIDEMIA TYPE: ICD-10-CM

## 2021-01-01 DIAGNOSIS — M89.9 CHRONIC KIDNEY DISEASE-MINERAL AND BONE DISORDER: Primary | ICD-10-CM

## 2021-01-01 DIAGNOSIS — N18.9 ACUTE KIDNEY INJURY SUPERIMPOSED ON CHRONIC KIDNEY DISEASE (HCC): ICD-10-CM

## 2021-01-01 DIAGNOSIS — R79.1 SUPRATHERAPEUTIC INR: ICD-10-CM

## 2021-01-01 DIAGNOSIS — D62 ACUTE BLOOD LOSS ANEMIA: ICD-10-CM

## 2021-01-01 DIAGNOSIS — K21.9 GERD (GASTROESOPHAGEAL REFLUX DISEASE): ICD-10-CM

## 2021-01-01 DIAGNOSIS — E83.9 CHRONIC KIDNEY DISEASE-MINERAL AND BONE DISORDER: Primary | ICD-10-CM

## 2021-01-01 DIAGNOSIS — N18.9 ANEMIA DUE TO CHRONIC KIDNEY DISEASE, UNSPECIFIED CKD STAGE: ICD-10-CM

## 2021-01-01 DIAGNOSIS — E87.6 HYPOKALEMIA: ICD-10-CM

## 2021-01-01 DIAGNOSIS — Z99.2 ESRD (END STAGE RENAL DISEASE) ON DIALYSIS (HCC): Primary | ICD-10-CM

## 2021-01-01 DIAGNOSIS — K92.2 GI BLEED: Primary | ICD-10-CM

## 2021-01-01 DIAGNOSIS — I35.0 AORTIC VALVE STENOSIS, ETIOLOGY OF CARDIAC VALVE DISEASE UNSPECIFIED: ICD-10-CM

## 2021-01-01 DIAGNOSIS — D68.9 COAGULOPATHY (HCC): ICD-10-CM

## 2021-01-01 DIAGNOSIS — I10 ESSENTIAL HYPERTENSION: Primary | ICD-10-CM

## 2021-01-01 DIAGNOSIS — G47.00 INSOMNIA, UNSPECIFIED TYPE: ICD-10-CM

## 2021-01-01 DIAGNOSIS — Z99.2 ESRD ON HEMODIALYSIS (HCC): ICD-10-CM

## 2021-01-01 DIAGNOSIS — M12.812 LEFT ROTATOR CUFF TEAR ARTHROPATHY: Primary | ICD-10-CM

## 2021-01-01 DIAGNOSIS — I50.32 CHRONIC DIASTOLIC HEART FAILURE (HCC): ICD-10-CM

## 2021-01-01 LAB
2HR DELTA HS TROPONIN: 0 NG/L
4HR DELTA HS TROPONIN: -3 NG/L
ABO GROUP BLD BPU: NORMAL
ABO GROUP BLD: NORMAL
ABO GROUP BLD: NORMAL
ALBUMIN SERPL BCP-MCNC: 2.3 G/DL (ref 3.5–5)
ALP SERPL-CCNC: 92 U/L (ref 46–116)
ALT SERPL W P-5'-P-CCNC: 39 U/L (ref 12–78)
ANION GAP SERPL CALCULATED.3IONS-SCNC: 14 MMOL/L (ref 4–13)
ANION GAP SERPL CALCULATED.3IONS-SCNC: 4 MMOL/L (ref 4–13)
ANION GAP SERPL CALCULATED.3IONS-SCNC: 6 MMOL/L (ref 4–13)
ANION GAP SERPL CALCULATED.3IONS-SCNC: 6 MMOL/L (ref 4–13)
ANION GAP SERPL CALCULATED.3IONS-SCNC: 8 MMOL/L (ref 4–13)
ANION GAP SERPL CALCULATED.3IONS-SCNC: 9 MMOL/L (ref 4–13)
AST SERPL W P-5'-P-CCNC: 46 U/L (ref 5–45)
ATRIAL RATE: 84 BPM
ATRIAL RATE: 93 BPM
BASOPHILS # BLD AUTO: 0.02 THOUSANDS/ΜL (ref 0–0.1)
BASOPHILS # BLD AUTO: 0.02 THOUSANDS/ΜL (ref 0–0.1)
BASOPHILS # BLD AUTO: 0.04 THOUSANDS/ΜL (ref 0–0.1)
BASOPHILS NFR BLD AUTO: 0 % (ref 0–1)
BASOPHILS NFR BLD AUTO: 0 % (ref 0–1)
BASOPHILS NFR BLD AUTO: 1 % (ref 0–1)
BILIRUB SERPL-MCNC: 0.67 MG/DL (ref 0.2–1)
BLD GP AB SCN SERPL QL: NEGATIVE
BLD GP AB SCN SERPL QL: NEGATIVE
BPU ID: NORMAL
BUN SERPL-MCNC: 112 MG/DL (ref 5–25)
BUN SERPL-MCNC: 113 MG/DL (ref 5–25)
BUN SERPL-MCNC: 45 MG/DL (ref 5–25)
BUN SERPL-MCNC: 57 MG/DL (ref 5–25)
BUN SERPL-MCNC: 65 MG/DL (ref 5–25)
BUN SERPL-MCNC: 80 MG/DL (ref 5–25)
CALCIUM ALBUM COR SERPL-MCNC: 9.1 MG/DL (ref 8.3–10.1)
CALCIUM SERPL-MCNC: 7.6 MG/DL (ref 8.3–10.1)
CALCIUM SERPL-MCNC: 7.7 MG/DL (ref 8.3–10.1)
CALCIUM SERPL-MCNC: 7.7 MG/DL (ref 8.3–10.1)
CALCIUM SERPL-MCNC: 7.9 MG/DL (ref 8.3–10.1)
CALCIUM SERPL-MCNC: 8.2 MG/DL (ref 8.3–10.1)
CALCIUM SERPL-MCNC: 8.2 MG/DL (ref 8.3–10.1)
CARDIAC TROPONIN I PNL SERPL HS: 17 NG/L
CARDIAC TROPONIN I PNL SERPL HS: 20 NG/L
CARDIAC TROPONIN I PNL SERPL HS: 20 NG/L
CHLORIDE SERPL-SCNC: 100 MMOL/L (ref 100–108)
CHLORIDE SERPL-SCNC: 102 MMOL/L (ref 100–108)
CHLORIDE SERPL-SCNC: 103 MMOL/L (ref 100–108)
CHLORIDE SERPL-SCNC: 105 MMOL/L (ref 100–108)
CHLORIDE SERPL-SCNC: 105 MMOL/L (ref 100–108)
CHLORIDE SERPL-SCNC: 99 MMOL/L (ref 100–108)
CHOLEST SERPL-MCNC: 136 MG/DL (ref 50–200)
CO2 SERPL-SCNC: 27 MMOL/L (ref 21–32)
CO2 SERPL-SCNC: 28 MMOL/L (ref 21–32)
CO2 SERPL-SCNC: 29 MMOL/L (ref 21–32)
CO2 SERPL-SCNC: 29 MMOL/L (ref 21–32)
CO2 SERPL-SCNC: 32 MMOL/L (ref 21–32)
CO2 SERPL-SCNC: 33 MMOL/L (ref 21–32)
CREAT SERPL-MCNC: 3.92 MG/DL (ref 0.6–1.3)
CREAT SERPL-MCNC: 4.69 MG/DL (ref 0.6–1.3)
CREAT SERPL-MCNC: 5.45 MG/DL (ref 0.6–1.3)
CREAT SERPL-MCNC: 5.8 MG/DL (ref 0.6–1.3)
CREAT SERPL-MCNC: 6.43 MG/DL (ref 0.6–1.3)
CREAT SERPL-MCNC: 7.33 MG/DL (ref 0.6–1.3)
CROSSMATCH: NORMAL
EOSINOPHIL # BLD AUTO: 0.13 THOUSAND/ΜL (ref 0–0.61)
EOSINOPHIL # BLD AUTO: 0.25 THOUSAND/ΜL (ref 0–0.61)
EOSINOPHIL # BLD AUTO: 0.26 THOUSAND/ΜL (ref 0–0.61)
EOSINOPHIL NFR BLD AUTO: 2 % (ref 0–6)
EOSINOPHIL NFR BLD AUTO: 3 % (ref 0–6)
EOSINOPHIL NFR BLD AUTO: 4 % (ref 0–6)
ERYTHROCYTE [DISTWIDTH] IN BLOOD BY AUTOMATED COUNT: 13.6 % (ref 11.6–15.1)
ERYTHROCYTE [DISTWIDTH] IN BLOOD BY AUTOMATED COUNT: 15.5 % (ref 11.6–15.1)
ERYTHROCYTE [DISTWIDTH] IN BLOOD BY AUTOMATED COUNT: 15.9 % (ref 11.6–15.1)
ERYTHROCYTE [DISTWIDTH] IN BLOOD BY AUTOMATED COUNT: 16.7 % (ref 11.6–15.1)
ERYTHROCYTE [DISTWIDTH] IN BLOOD BY AUTOMATED COUNT: 16.7 % (ref 11.6–15.1)
ERYTHROCYTE [DISTWIDTH] IN BLOOD BY AUTOMATED COUNT: 16.8 % (ref 11.6–15.1)
ERYTHROCYTE [DISTWIDTH] IN BLOOD BY AUTOMATED COUNT: 18 % (ref 11.6–15.1)
FERRITIN SERPL-MCNC: 314 NG/ML (ref 8–388)
GFR SERPL CREATININE-BSD FRML MDRD: 11 ML/MIN/1.73SQ M
GFR SERPL CREATININE-BSD FRML MDRD: 11 ML/MIN/1.73SQ M
GFR SERPL CREATININE-BSD FRML MDRD: 13 ML/MIN/1.73SQ M
GFR SERPL CREATININE-BSD FRML MDRD: 6 ML/MIN/1.73SQ M
GFR SERPL CREATININE-BSD FRML MDRD: 7 ML/MIN/1.73SQ M
GFR SERPL CREATININE-BSD FRML MDRD: 8 ML/MIN/1.73SQ M
GLUCOSE SERPL-MCNC: 100 MG/DL (ref 65–140)
GLUCOSE SERPL-MCNC: 110 MG/DL (ref 65–140)
GLUCOSE SERPL-MCNC: 112 MG/DL (ref 65–140)
GLUCOSE SERPL-MCNC: 115 MG/DL (ref 65–140)
GLUCOSE SERPL-MCNC: 72 MG/DL (ref 65–140)
GLUCOSE SERPL-MCNC: 95 MG/DL (ref 65–140)
HCT VFR BLD AUTO: 20.2 % (ref 36.5–49.3)
HCT VFR BLD AUTO: 22.2 % (ref 36.5–49.3)
HCT VFR BLD AUTO: 22.8 % (ref 36.5–49.3)
HCT VFR BLD AUTO: 23 % (ref 36.5–49.3)
HCT VFR BLD AUTO: 23.6 % (ref 36.5–49.3)
HCT VFR BLD AUTO: 25.5 % (ref 36.5–49.3)
HCT VFR BLD AUTO: 30.3 % (ref 36.5–49.3)
HCT VFR BLD AUTO: 35.8 % (ref 36.5–49.3)
HDLC SERPL-MCNC: 51 MG/DL
HGB BLD-MCNC: 11.3 G/DL (ref 12–17)
HGB BLD-MCNC: 5.8 G/DL (ref 12–17)
HGB BLD-MCNC: 6.8 G/DL (ref 12–17)
HGB BLD-MCNC: 7.1 G/DL (ref 12–17)
HGB BLD-MCNC: 7.3 G/DL (ref 12–17)
HGB BLD-MCNC: 7.4 G/DL (ref 12–17)
HGB BLD-MCNC: 7.5 G/DL (ref 12–17)
HGB BLD-MCNC: 7.5 G/DL (ref 12–17)
HGB BLD-MCNC: 7.8 G/DL (ref 12–17)
HGB BLD-MCNC: 7.8 G/DL (ref 12–17)
HGB BLD-MCNC: 8.6 G/DL (ref 12–17)
HGB BLD-MCNC: 9.4 G/DL (ref 12–17)
IMM GRANULOCYTES # BLD AUTO: 0.03 THOUSAND/UL (ref 0–0.2)
IMM GRANULOCYTES # BLD AUTO: 0.04 THOUSAND/UL (ref 0–0.2)
IMM GRANULOCYTES # BLD AUTO: 0.05 THOUSAND/UL (ref 0–0.2)
IMM GRANULOCYTES NFR BLD AUTO: 0 % (ref 0–2)
IMM GRANULOCYTES NFR BLD AUTO: 1 % (ref 0–2)
IMM GRANULOCYTES NFR BLD AUTO: 1 % (ref 0–2)
INR PPP: 1.53 (ref 0.84–1.19)
INR PPP: 1.77 (ref 0.84–1.19)
INR PPP: 1.9 (ref 0.84–1.19)
INR PPP: 10.05 (ref 0.84–1.19)
INR PPP: 11.99 (ref 0.84–1.19)
INR PPP: 13.18 (ref 0.84–1.19)
INR PPP: 2.64 (ref 0.84–1.19)
INR PPP: 2.72 (ref 0.84–1.19)
INR PPP: 8.46 (ref 0.84–1.19)
IRON SATN MFR SERPL: 83 % (ref 20–50)
IRON SERPL-MCNC: 164 UG/DL (ref 65–175)
KCT BLD-ACNC: 132 SEC (ref 89–137)
LDLC SERPL CALC-MCNC: 71 MG/DL (ref 0–100)
LYMPHOCYTES # BLD AUTO: 1.33 THOUSANDS/ΜL (ref 0.6–4.47)
LYMPHOCYTES # BLD AUTO: 1.67 THOUSANDS/ΜL (ref 0.6–4.47)
LYMPHOCYTES # BLD AUTO: 1.77 THOUSANDS/ΜL (ref 0.6–4.47)
LYMPHOCYTES NFR BLD AUTO: 15 % (ref 14–44)
LYMPHOCYTES NFR BLD AUTO: 22 % (ref 14–44)
LYMPHOCYTES NFR BLD AUTO: 24 % (ref 14–44)
MAGNESIUM SERPL-MCNC: 1.8 MG/DL (ref 1.6–2.6)
MCH RBC QN AUTO: 32 PG (ref 26.8–34.3)
MCH RBC QN AUTO: 32.4 PG (ref 26.8–34.3)
MCH RBC QN AUTO: 33.2 PG (ref 26.8–34.3)
MCH RBC QN AUTO: 33.3 PG (ref 26.8–34.3)
MCH RBC QN AUTO: 33.6 PG (ref 26.8–34.3)
MCH RBC QN AUTO: 34.3 PG (ref 26.8–34.3)
MCH RBC QN AUTO: 34.5 PG (ref 26.8–34.3)
MCHC RBC AUTO-ENTMCNC: 31 G/DL (ref 31.4–37.4)
MCHC RBC AUTO-ENTMCNC: 31.6 G/DL (ref 31.4–37.4)
MCHC RBC AUTO-ENTMCNC: 33.1 G/DL (ref 31.4–37.4)
MCHC RBC AUTO-ENTMCNC: 33.3 G/DL (ref 31.4–37.4)
MCHC RBC AUTO-ENTMCNC: 33.7 G/DL (ref 31.4–37.4)
MCHC RBC AUTO-ENTMCNC: 33.7 G/DL (ref 31.4–37.4)
MCHC RBC AUTO-ENTMCNC: 33.9 G/DL (ref 31.4–37.4)
MCV RBC AUTO: 100 FL (ref 82–98)
MCV RBC AUTO: 100 FL (ref 82–98)
MCV RBC AUTO: 101 FL (ref 82–98)
MCV RBC AUTO: 101 FL (ref 82–98)
MCV RBC AUTO: 102 FL (ref 82–98)
MCV RBC AUTO: 102 FL (ref 82–98)
MCV RBC AUTO: 105 FL (ref 82–98)
MONOCYTES # BLD AUTO: 1.15 THOUSAND/ΜL (ref 0.17–1.22)
MONOCYTES # BLD AUTO: 1.19 THOUSAND/ΜL (ref 0.17–1.22)
MONOCYTES # BLD AUTO: 1.35 THOUSAND/ΜL (ref 0.17–1.22)
MONOCYTES NFR BLD AUTO: 14 % (ref 4–12)
MONOCYTES NFR BLD AUTO: 15 % (ref 4–12)
MONOCYTES NFR BLD AUTO: 18 % (ref 4–12)
NEUTROPHILS # BLD AUTO: 4.07 THOUSANDS/ΜL (ref 1.85–7.62)
NEUTROPHILS # BLD AUTO: 4.41 THOUSANDS/ΜL (ref 1.85–7.62)
NEUTROPHILS # BLD AUTO: 5.95 THOUSANDS/ΜL (ref 1.85–7.62)
NEUTS SEG NFR BLD AUTO: 53 % (ref 43–75)
NEUTS SEG NFR BLD AUTO: 59 % (ref 43–75)
NEUTS SEG NFR BLD AUTO: 68 % (ref 43–75)
NONHDLC SERPL-MCNC: 85 MG/DL
NRBC BLD AUTO-RTO: 0 /100 WBCS
P AXIS: -36 DEGREES
P AXIS: 72 DEGREES
PLATELET # BLD AUTO: 103 THOUSANDS/UL (ref 149–390)
PLATELET # BLD AUTO: 115 THOUSANDS/UL (ref 149–390)
PLATELET # BLD AUTO: 118 THOUSANDS/UL (ref 149–390)
PLATELET # BLD AUTO: 126 THOUSANDS/UL (ref 149–390)
PLATELET # BLD AUTO: 134 THOUSANDS/UL (ref 149–390)
PLATELET # BLD AUTO: 174 THOUSANDS/UL (ref 149–390)
PLATELET # BLD AUTO: 98 THOUSANDS/UL (ref 149–390)
PMV BLD AUTO: 10 FL (ref 8.9–12.7)
PMV BLD AUTO: 10.1 FL (ref 8.9–12.7)
PMV BLD AUTO: 10.4 FL (ref 8.9–12.7)
PMV BLD AUTO: 10.4 FL (ref 8.9–12.7)
PMV BLD AUTO: 10.7 FL (ref 8.9–12.7)
PMV BLD AUTO: 10.8 FL (ref 8.9–12.7)
PMV BLD AUTO: 9.7 FL (ref 8.9–12.7)
POTASSIUM SERPL-SCNC: 3.9 MMOL/L (ref 3.5–5.3)
POTASSIUM SERPL-SCNC: 3.9 MMOL/L (ref 3.5–5.3)
POTASSIUM SERPL-SCNC: 4.3 MMOL/L (ref 3.5–5.3)
POTASSIUM SERPL-SCNC: 4.6 MMOL/L (ref 3.5–5.3)
POTASSIUM SERPL-SCNC: 4.6 MMOL/L (ref 3.5–5.3)
POTASSIUM SERPL-SCNC: 5 MMOL/L (ref 3.5–5.3)
PR INTERVAL: 208 MS
PR INTERVAL: 212 MS
PROT SERPL-MCNC: 6.4 G/DL (ref 6.4–8.2)
PROTHROMBIN TIME: 18.3 SECONDS (ref 11.6–14.5)
PROTHROMBIN TIME: 20.4 SECONDS (ref 11.6–14.5)
PROTHROMBIN TIME: 21.5 SECONDS (ref 11.6–14.5)
PROTHROMBIN TIME: 27.7 SECONDS (ref 11.6–14.5)
PROTHROMBIN TIME: 28.9 SECONDS (ref 11.6–14.5)
PROTHROMBIN TIME: 67.4 SECONDS (ref 11.6–14.5)
PROTHROMBIN TIME: 76.9 SECONDS (ref 11.6–14.5)
PROTHROMBIN TIME: 87.9 SECONDS (ref 11.6–14.5)
PROTHROMBIN TIME: 94.5 SECONDS (ref 11.6–14.5)
QRS AXIS: 76 DEGREES
QRS AXIS: 77 DEGREES
QRSD INTERVAL: 70 MS
QRSD INTERVAL: 72 MS
QT INTERVAL: 398 MS
QT INTERVAL: 492 MS
QTC INTERVAL: 485 MS
QTC INTERVAL: 572 MS
RBC # BLD AUTO: 1.98 MILLION/UL (ref 3.88–5.62)
RBC # BLD AUTO: 2.23 MILLION/UL (ref 3.88–5.62)
RBC # BLD AUTO: 2.26 MILLION/UL (ref 3.88–5.62)
RBC # BLD AUTO: 2.34 MILLION/UL (ref 3.88–5.62)
RBC # BLD AUTO: 2.56 MILLION/UL (ref 3.88–5.62)
RBC # BLD AUTO: 2.9 MILLION/UL (ref 3.88–5.62)
RBC # BLD AUTO: 3.53 MILLION/UL (ref 3.88–5.62)
RH BLD: POSITIVE
RH BLD: POSITIVE
SODIUM SERPL-SCNC: 138 MMOL/L (ref 136–145)
SODIUM SERPL-SCNC: 139 MMOL/L (ref 136–145)
SODIUM SERPL-SCNC: 140 MMOL/L (ref 136–145)
SODIUM SERPL-SCNC: 142 MMOL/L (ref 136–145)
SPECIMEN EXPIRATION DATE: NORMAL
SPECIMEN EXPIRATION DATE: NORMAL
SPECIMEN SOURCE: NORMAL
T WAVE AXIS: 91 DEGREES
T WAVE AXIS: 95 DEGREES
TIBC SERPL-MCNC: 197 UG/DL (ref 250–450)
TRIGL SERPL-MCNC: 71 MG/DL
UNIT DISPENSE STATUS: NORMAL
UNIT PRODUCT CODE: NORMAL
UNIT PRODUCT VOLUME: 350 ML
UNIT RH: NORMAL
VENTRICULAR RATE: 81 BPM
VENTRICULAR RATE: 89 BPM
WBC # BLD AUTO: 10.54 THOUSAND/UL (ref 4.31–10.16)
WBC # BLD AUTO: 6.91 THOUSAND/UL (ref 4.31–10.16)
WBC # BLD AUTO: 6.95 THOUSAND/UL (ref 4.31–10.16)
WBC # BLD AUTO: 7.52 THOUSAND/UL (ref 4.31–10.16)
WBC # BLD AUTO: 7.55 THOUSAND/UL (ref 4.31–10.16)
WBC # BLD AUTO: 8.66 THOUSAND/UL (ref 4.31–10.16)
WBC # BLD AUTO: 8.78 THOUSAND/UL (ref 4.31–10.16)

## 2021-01-01 PROCEDURE — 1036F TOBACCO NON-USER: CPT | Performed by: NURSE PRACTITIONER

## 2021-01-01 PROCEDURE — 99232 SBSQ HOSP IP/OBS MODERATE 35: CPT | Performed by: INTERNAL MEDICINE

## 2021-01-01 PROCEDURE — 93005 ELECTROCARDIOGRAM TRACING: CPT

## 2021-01-01 PROCEDURE — 5A1D70Z PERFORMANCE OF URINARY FILTRATION, INTERMITTENT, LESS THAN 6 HOURS PER DAY: ICD-10-PCS | Performed by: INTERNAL MEDICINE

## 2021-01-01 PROCEDURE — C1894 INTRO/SHEATH, NON-LASER: HCPCS

## 2021-01-01 PROCEDURE — 43235 EGD DIAGNOSTIC BRUSH WASH: CPT | Performed by: INTERNAL MEDICINE

## 2021-01-01 PROCEDURE — 86920 COMPATIBILITY TEST SPIN: CPT

## 2021-01-01 PROCEDURE — P9016 RBC LEUKOCYTES REDUCED: HCPCS

## 2021-01-01 PROCEDURE — 86900 BLOOD TYPING SEROLOGIC ABO: CPT | Performed by: PHYSICIAN ASSISTANT

## 2021-01-01 PROCEDURE — C9113 INJ PANTOPRAZOLE SODIUM, VIA: HCPCS | Performed by: EMERGENCY MEDICINE

## 2021-01-01 PROCEDURE — C1769 GUIDE WIRE: HCPCS

## 2021-01-01 PROCEDURE — 80048 BASIC METABOLIC PNL TOTAL CA: CPT | Performed by: INTERNAL MEDICINE

## 2021-01-01 PROCEDURE — 36415 COLL VENOUS BLD VENIPUNCTURE: CPT | Performed by: PHYSICIAN ASSISTANT

## 2021-01-01 PROCEDURE — 71045 X-RAY EXAM CHEST 1 VIEW: CPT

## 2021-01-01 PROCEDURE — 36905 THRMBC/NFS DIALYSIS CIRCUIT: CPT | Performed by: INTERNAL MEDICINE

## 2021-01-01 PROCEDURE — 99153 MOD SED SAME PHYS/QHP EA: CPT

## 2021-01-01 PROCEDURE — 84484 ASSAY OF TROPONIN QUANT: CPT | Performed by: EMERGENCY MEDICINE

## 2021-01-01 PROCEDURE — 3725F SCREEN DEPRESSION PERFORMED: CPT | Performed by: NURSE PRACTITIONER

## 2021-01-01 PROCEDURE — 99152 MOD SED SAME PHYS/QHP 5/>YRS: CPT | Performed by: INTERNAL MEDICINE

## 2021-01-01 PROCEDURE — 82728 ASSAY OF FERRITIN: CPT | Performed by: INTERNAL MEDICINE

## 2021-01-01 PROCEDURE — 93010 ELECTROCARDIOGRAM REPORT: CPT | Performed by: INTERNAL MEDICINE

## 2021-01-01 PROCEDURE — 80048 BASIC METABOLIC PNL TOTAL CA: CPT

## 2021-01-01 PROCEDURE — 99239 HOSP IP/OBS DSCHRG MGMT >30: CPT | Performed by: INTERNAL MEDICINE

## 2021-01-01 PROCEDURE — 1160F RVW MEDS BY RX/DR IN RCRD: CPT | Performed by: NURSE PRACTITIONER

## 2021-01-01 PROCEDURE — 85027 COMPLETE CBC AUTOMATED: CPT | Performed by: INTERNAL MEDICINE

## 2021-01-01 PROCEDURE — C1725 CATH, TRANSLUMIN NON-LASER: HCPCS

## 2021-01-01 PROCEDURE — 86901 BLOOD TYPING SEROLOGIC RH(D): CPT | Performed by: PHYSICIAN ASSISTANT

## 2021-01-01 PROCEDURE — 85610 PROTHROMBIN TIME: CPT

## 2021-01-01 PROCEDURE — 99152 MOD SED SAME PHYS/QHP 5/>YRS: CPT | Performed by: RADIOLOGY

## 2021-01-01 PROCEDURE — 86900 BLOOD TYPING SEROLOGIC ABO: CPT

## 2021-01-01 PROCEDURE — 86850 RBC ANTIBODY SCREEN: CPT | Performed by: PHYSICIAN ASSISTANT

## 2021-01-01 PROCEDURE — 99283 EMERGENCY DEPT VISIT LOW MDM: CPT

## 2021-01-01 PROCEDURE — 85610 PROTHROMBIN TIME: CPT | Performed by: INTERNAL MEDICINE

## 2021-01-01 PROCEDURE — 99495 TRANSJ CARE MGMT MOD F2F 14D: CPT | Performed by: NURSE PRACTITIONER

## 2021-01-01 PROCEDURE — 36415 COLL VENOUS BLD VENIPUNCTURE: CPT | Performed by: INTERNAL MEDICINE

## 2021-01-01 PROCEDURE — 3078F DIAST BP <80 MM HG: CPT | Performed by: NURSE PRACTITIONER

## 2021-01-01 PROCEDURE — 1160F RVW MEDS BY RX/DR IN RCRD: CPT | Performed by: PHYSICIAN ASSISTANT

## 2021-01-01 PROCEDURE — P9040 RBC LEUKOREDUCED IRRADIATED: HCPCS

## 2021-01-01 PROCEDURE — 73030 X-RAY EXAM OF SHOULDER: CPT

## 2021-01-01 PROCEDURE — 85018 HEMOGLOBIN: CPT | Performed by: INTERNAL MEDICINE

## 2021-01-01 PROCEDURE — 3074F SYST BP LT 130 MM HG: CPT | Performed by: NURSE PRACTITIONER

## 2021-01-01 PROCEDURE — 90935 HEMODIALYSIS ONE EVALUATION: CPT | Performed by: INTERNAL MEDICINE

## 2021-01-01 PROCEDURE — 85018 HEMOGLOBIN: CPT

## 2021-01-01 PROCEDURE — 83550 IRON BINDING TEST: CPT | Performed by: INTERNAL MEDICINE

## 2021-01-01 PROCEDURE — 99213 OFFICE O/P EST LOW 20 MIN: CPT | Performed by: PHYSICIAN ASSISTANT

## 2021-01-01 PROCEDURE — 20610 DRAIN/INJ JOINT/BURSA W/O US: CPT | Performed by: PHYSICIAN ASSISTANT

## 2021-01-01 PROCEDURE — 99285 EMERGENCY DEPT VISIT HI MDM: CPT

## 2021-01-01 PROCEDURE — 36902 INTRO CATH DIALYSIS CIRCUIT: CPT

## 2021-01-01 PROCEDURE — C1887 CATHETER, GUIDING: HCPCS

## 2021-01-01 PROCEDURE — 99223 1ST HOSP IP/OBS HIGH 75: CPT | Performed by: INTERNAL MEDICINE

## 2021-01-01 PROCEDURE — 36905 THRMBC/NFS DIALYSIS CIRCUIT: CPT | Performed by: RADIOLOGY

## 2021-01-01 PROCEDURE — 80053 COMPREHEN METABOLIC PANEL: CPT | Performed by: EMERGENCY MEDICINE

## 2021-01-01 PROCEDURE — 86850 RBC ANTIBODY SCREEN: CPT

## 2021-01-01 PROCEDURE — C2623 CATH, TRANSLUMIN, DRUG-COAT: HCPCS

## 2021-01-01 PROCEDURE — 99152 MOD SED SAME PHYS/QHP 5/>YRS: CPT

## 2021-01-01 PROCEDURE — 88305 TISSUE EXAM BY PATHOLOGIST: CPT | Performed by: PATHOLOGY

## 2021-01-01 PROCEDURE — 99213 OFFICE O/P EST LOW 20 MIN: CPT | Performed by: NURSE PRACTITIONER

## 2021-01-01 PROCEDURE — 96365 THER/PROPH/DIAG IV INF INIT: CPT

## 2021-01-01 PROCEDURE — C1757 CATH, THROMBECTOMY/EMBOLECT: HCPCS

## 2021-01-01 PROCEDURE — 3078F DIAST BP <80 MM HG: CPT | Performed by: PHYSICIAN ASSISTANT

## 2021-01-01 PROCEDURE — 86923 COMPATIBILITY TEST ELECTRIC: CPT

## 2021-01-01 PROCEDURE — 84484 ASSAY OF TROPONIN QUANT: CPT | Performed by: INTERNAL MEDICINE

## 2021-01-01 PROCEDURE — 80048 BASIC METABOLIC PNL TOTAL CA: CPT | Performed by: PHYSICIAN ASSISTANT

## 2021-01-01 PROCEDURE — 3074F SYST BP LT 130 MM HG: CPT | Performed by: PHYSICIAN ASSISTANT

## 2021-01-01 PROCEDURE — 83540 ASSAY OF IRON: CPT | Performed by: INTERNAL MEDICINE

## 2021-01-01 PROCEDURE — 85025 COMPLETE CBC W/AUTO DIFF WBC: CPT | Performed by: PHYSICIAN ASSISTANT

## 2021-01-01 PROCEDURE — 0DBL8ZZ EXCISION OF TRANSVERSE COLON, VIA NATURAL OR ARTIFICIAL OPENING ENDOSCOPIC: ICD-10-PCS | Performed by: INTERNAL MEDICINE

## 2021-01-01 PROCEDURE — 83735 ASSAY OF MAGNESIUM: CPT | Performed by: EMERGENCY MEDICINE

## 2021-01-01 PROCEDURE — 85027 COMPLETE CBC AUTOMATED: CPT

## 2021-01-01 PROCEDURE — 30233N1 TRANSFUSION OF NONAUTOLOGOUS RED BLOOD CELLS INTO PERIPHERAL VEIN, PERCUTANEOUS APPROACH: ICD-10-PCS | Performed by: INTERNAL MEDICINE

## 2021-01-01 PROCEDURE — 1111F DSCHRG MED/CURRENT MED MERGE: CPT | Performed by: NURSE PRACTITIONER

## 2021-01-01 PROCEDURE — 86901 BLOOD TYPING SEROLOGIC RH(D): CPT

## 2021-01-01 PROCEDURE — 99215 OFFICE O/P EST HI 40 MIN: CPT | Performed by: NURSE PRACTITIONER

## 2021-01-01 PROCEDURE — 99291 CRITICAL CARE FIRST HOUR: CPT | Performed by: EMERGENCY MEDICINE

## 2021-01-01 PROCEDURE — 85610 PROTHROMBIN TIME: CPT | Performed by: EMERGENCY MEDICINE

## 2021-01-01 PROCEDURE — 85347 COAGULATION TIME ACTIVATED: CPT

## 2021-01-01 PROCEDURE — 80061 LIPID PANEL: CPT

## 2021-01-01 PROCEDURE — 85025 COMPLETE CBC W/AUTO DIFF WBC: CPT | Performed by: EMERGENCY MEDICINE

## 2021-01-01 PROCEDURE — 1036F TOBACCO NON-USER: CPT | Performed by: PHYSICIAN ASSISTANT

## 2021-01-01 PROCEDURE — 99284 EMERGENCY DEPT VISIT MOD MDM: CPT | Performed by: EMERGENCY MEDICINE

## 2021-01-01 PROCEDURE — 85014 HEMATOCRIT: CPT

## 2021-01-01 PROCEDURE — 45385 COLONOSCOPY W/LESION REMOVAL: CPT | Performed by: INTERNAL MEDICINE

## 2021-01-01 PROCEDURE — 85610 PROTHROMBIN TIME: CPT | Performed by: RADIOLOGY

## 2021-01-01 PROCEDURE — 99222 1ST HOSP IP/OBS MODERATE 55: CPT | Performed by: INTERNAL MEDICINE

## 2021-01-01 PROCEDURE — 0DJ08ZZ INSPECTION OF UPPER INTESTINAL TRACT, VIA NATURAL OR ARTIFICIAL OPENING ENDOSCOPIC: ICD-10-PCS | Performed by: INTERNAL MEDICINE

## 2021-01-01 PROCEDURE — 99284 EMERGENCY DEPT VISIT MOD MDM: CPT | Performed by: PHYSICIAN ASSISTANT

## 2021-01-01 PROCEDURE — 85025 COMPLETE CBC W/AUTO DIFF WBC: CPT

## 2021-01-01 PROCEDURE — 76937 US GUIDE VASCULAR ACCESS: CPT | Performed by: RADIOLOGY

## 2021-01-01 PROCEDURE — 82272 OCCULT BLD FECES 1-3 TESTS: CPT

## 2021-01-01 PROCEDURE — 36905 THRMBC/NFS DIALYSIS CIRCUIT: CPT

## 2021-01-01 RX ORDER — PANTOPRAZOLE SODIUM 40 MG/1
40 TABLET, DELAYED RELEASE ORAL
Status: DISCONTINUED | OUTPATIENT
Start: 2021-01-01 | End: 2021-01-01 | Stop reason: HOSPADM

## 2021-01-01 RX ORDER — POTASSIUM CHLORIDE 20 MEQ/1
20 TABLET, EXTENDED RELEASE ORAL 2 TIMES DAILY
Qty: 60 TABLET | Refills: 5 | Status: SHIPPED | OUTPATIENT
Start: 2021-01-01 | End: 2021-01-01 | Stop reason: HOSPADM

## 2021-01-01 RX ORDER — FENTANYL CITRATE 50 UG/ML
INJECTION, SOLUTION INTRAMUSCULAR; INTRAVENOUS CODE/TRAUMA/SEDATION MEDICATION
Status: COMPLETED | OUTPATIENT
Start: 2021-01-01 | End: 2021-01-01

## 2021-01-01 RX ORDER — PHYTONADIONE 5 MG/1
10 TABLET ORAL ONCE
Status: COMPLETED | OUTPATIENT
Start: 2021-01-01 | End: 2021-01-01

## 2021-01-01 RX ORDER — LIDOCAINE WITH 8.4% SOD BICARB 0.9%(10ML)
SYRINGE (ML) INJECTION CODE/TRAUMA/SEDATION MEDICATION
Status: COMPLETED | OUTPATIENT
Start: 2021-01-01 | End: 2021-01-01

## 2021-01-01 RX ORDER — SODIUM CHLORIDE 9 MG/ML
INJECTION, SOLUTION INTRAVENOUS CONTINUOUS PRN
Status: DISCONTINUED | OUTPATIENT
Start: 2021-01-01 | End: 2021-01-01

## 2021-01-01 RX ORDER — BUPIVACAINE HYDROCHLORIDE 5 MG/ML
2 INJECTION, SOLUTION EPIDURAL; INTRACAUDAL
Status: COMPLETED | OUTPATIENT
Start: 2021-01-01 | End: 2021-01-01

## 2021-01-01 RX ORDER — MIDAZOLAM HYDROCHLORIDE 2 MG/2ML
INJECTION, SOLUTION INTRAMUSCULAR; INTRAVENOUS CODE/TRAUMA/SEDATION MEDICATION
Status: COMPLETED | OUTPATIENT
Start: 2021-01-01 | End: 2021-01-01

## 2021-01-01 RX ORDER — ATORVASTATIN CALCIUM 40 MG/1
40 TABLET, FILM COATED ORAL DAILY
Status: DISCONTINUED | OUTPATIENT
Start: 2021-01-01 | End: 2021-01-01 | Stop reason: HOSPADM

## 2021-01-01 RX ORDER — BUMETANIDE 1 MG/1
4 TABLET ORAL 2 TIMES DAILY
Status: DISCONTINUED | OUTPATIENT
Start: 2021-01-01 | End: 2021-01-01 | Stop reason: HOSPADM

## 2021-01-01 RX ORDER — ONDANSETRON 2 MG/ML
4 INJECTION INTRAMUSCULAR; INTRAVENOUS EVERY 4 HOURS PRN
Status: DISCONTINUED | OUTPATIENT
Start: 2021-01-01 | End: 2021-01-01 | Stop reason: HOSPADM

## 2021-01-01 RX ORDER — SODIUM CHLORIDE 9 MG/ML
50 INJECTION, SOLUTION INTRAVENOUS CONTINUOUS
Status: DISCONTINUED | OUTPATIENT
Start: 2021-01-01 | End: 2021-01-01 | Stop reason: HOSPADM

## 2021-01-01 RX ORDER — PANTOPRAZOLE SODIUM 40 MG/1
40 INJECTION, POWDER, FOR SOLUTION INTRAVENOUS ONCE
Status: COMPLETED | OUTPATIENT
Start: 2021-01-01 | End: 2021-01-01

## 2021-01-01 RX ORDER — KETOCONAZOLE 20 MG/G
CREAM TOPICAL
Qty: 30 G | Refills: 0 | Status: SHIPPED | OUTPATIENT
Start: 2021-01-01 | End: 2021-01-01

## 2021-01-01 RX ORDER — PANTOPRAZOLE SODIUM 40 MG/1
40 TABLET, DELAYED RELEASE ORAL
Qty: 30 TABLET | Refills: 0 | Status: SHIPPED | OUTPATIENT
Start: 2021-01-01 | End: 2021-01-01 | Stop reason: SDUPTHER

## 2021-01-01 RX ORDER — KETOCONAZOLE 20 MG/G
CREAM TOPICAL DAILY
Qty: 30 G | Refills: 2 | Status: SHIPPED | OUTPATIENT
Start: 2021-01-01 | End: 2022-01-01

## 2021-01-01 RX ORDER — DOCUSATE SODIUM 100 MG/1
CAPSULE, LIQUID FILLED ORAL
Qty: 30 CAPSULE | Refills: 3 | Status: SHIPPED | OUTPATIENT
Start: 2021-01-01 | End: 2021-01-01

## 2021-01-01 RX ORDER — METHYLPREDNISOLONE 4 MG/1
TABLET ORAL
Qty: 21 TABLET | Refills: 0 | Status: SHIPPED | OUTPATIENT
Start: 2021-01-01 | End: 2021-01-01

## 2021-01-01 RX ORDER — LANOLIN ALCOHOL/MO/W.PET/CERES
3 CREAM (GRAM) TOPICAL
Status: DISCONTINUED | OUTPATIENT
Start: 2021-01-01 | End: 2021-01-01 | Stop reason: HOSPADM

## 2021-01-01 RX ORDER — SODIUM CHLORIDE 9 MG/ML
50 INJECTION, SOLUTION INTRAVENOUS CONTINUOUS
Status: CANCELLED | OUTPATIENT
Start: 2021-01-01

## 2021-01-01 RX ORDER — WARFARIN SODIUM 1 MG/1
1 TABLET ORAL
Qty: 30 TABLET | Refills: 0 | Status: SHIPPED | OUTPATIENT
Start: 2021-01-01 | End: 2022-01-01 | Stop reason: SDUPTHER

## 2021-01-01 RX ORDER — METOPROLOL SUCCINATE 25 MG/1
TABLET, EXTENDED RELEASE ORAL
Qty: 30 TABLET | Refills: 3 | Status: SHIPPED | OUTPATIENT
Start: 2021-01-01 | End: 2022-01-01

## 2021-01-01 RX ORDER — ATORVASTATIN CALCIUM 40 MG/1
40 TABLET, FILM COATED ORAL DAILY
Qty: 90 TABLET | Refills: 0 | Status: SHIPPED | OUTPATIENT
Start: 2021-01-01 | End: 2021-01-01

## 2021-01-01 RX ORDER — OXYCODONE HYDROCHLORIDE 5 MG/1
2.5 TABLET ORAL EVERY 6 HOURS PRN
Status: DISCONTINUED | OUTPATIENT
Start: 2021-01-01 | End: 2021-01-01 | Stop reason: HOSPADM

## 2021-01-01 RX ORDER — WARFARIN SODIUM 2.5 MG/1
TABLET ORAL
Qty: 60 TABLET | Refills: 5 | Status: SHIPPED | OUTPATIENT
Start: 2021-01-01 | End: 2021-01-01 | Stop reason: HOSPADM

## 2021-01-01 RX ORDER — LIDOCAINE HYDROCHLORIDE 10 MG/ML
5 INJECTION, SOLUTION EPIDURAL; INFILTRATION; INTRACAUDAL; PERINEURAL ONCE
Status: COMPLETED | OUTPATIENT
Start: 2021-01-01 | End: 2021-01-01

## 2021-01-01 RX ORDER — PHYTONADIONE 5 MG/1
10 TABLET ORAL ONCE
Status: DISCONTINUED | OUTPATIENT
Start: 2021-01-01 | End: 2021-01-01

## 2021-01-01 RX ORDER — LIDOCAINE HYDROCHLORIDE 10 MG/ML
2 INJECTION, SOLUTION INFILTRATION; PERINEURAL
Status: COMPLETED | OUTPATIENT
Start: 2021-01-01 | End: 2021-01-01

## 2021-01-01 RX ORDER — ATORVASTATIN CALCIUM 40 MG/1
40 TABLET, FILM COATED ORAL DAILY
Qty: 90 TABLET | Refills: 0 | Status: SHIPPED | OUTPATIENT
Start: 2021-01-01 | End: 2022-01-01

## 2021-01-01 RX ORDER — ACETAMINOPHEN 325 MG/1
975 TABLET ORAL EVERY 6 HOURS PRN
Status: DISCONTINUED | OUTPATIENT
Start: 2021-01-01 | End: 2021-01-01 | Stop reason: HOSPADM

## 2021-01-01 RX ORDER — PANTOPRAZOLE SODIUM 40 MG/1
40 TABLET, DELAYED RELEASE ORAL
Status: DISCONTINUED | OUTPATIENT
Start: 2021-01-01 | End: 2021-01-01

## 2021-01-01 RX ORDER — KETOCONAZOLE 20 MG/G
CREAM TOPICAL
Qty: 30 G | Refills: 0 | Status: SHIPPED | OUTPATIENT
Start: 2021-01-01 | End: 2021-01-01 | Stop reason: SDUPTHER

## 2021-01-01 RX ORDER — GABAPENTIN 100 MG/1
CAPSULE ORAL
Status: ON HOLD | COMMUNITY
Start: 2021-01-01 | End: 2022-01-01

## 2021-01-01 RX ORDER — LANOLIN ALCOHOL/MO/W.PET/CERES
3 CREAM (GRAM) TOPICAL
Qty: 30 TABLET | Refills: 0 | Status: SHIPPED | OUTPATIENT
Start: 2021-01-01 | End: 2022-01-01

## 2021-01-01 RX ORDER — TRIAMCINOLONE ACETONIDE 40 MG/ML
80 INJECTION, SUSPENSION INTRA-ARTICULAR; INTRAMUSCULAR
Status: COMPLETED | OUTPATIENT
Start: 2021-01-01 | End: 2021-01-01

## 2021-01-01 RX ORDER — PROPOFOL 10 MG/ML
INJECTION, EMULSION INTRAVENOUS AS NEEDED
Status: DISCONTINUED | OUTPATIENT
Start: 2021-01-01 | End: 2021-01-01

## 2021-01-01 RX ORDER — PHYTONADIONE 10 MG/ML
10 INJECTION, EMULSION INTRAMUSCULAR; INTRAVENOUS; SUBCUTANEOUS ONCE
Status: DISCONTINUED | OUTPATIENT
Start: 2021-01-01 | End: 2021-01-01

## 2021-01-01 RX ORDER — LIDOCAINE 50 MG/G
1 PATCH TOPICAL DAILY
Status: DISCONTINUED | OUTPATIENT
Start: 2021-01-01 | End: 2021-01-01 | Stop reason: HOSPADM

## 2021-01-01 RX ORDER — WARFARIN SODIUM 1 MG/1
1 TABLET ORAL
Qty: 30 TABLET | Refills: 0 | Status: SHIPPED | OUTPATIENT
Start: 2021-01-01 | End: 2021-01-01

## 2021-01-01 RX ORDER — WARFARIN SODIUM 1 MG/1
1 TABLET ORAL
Status: DISCONTINUED | OUTPATIENT
Start: 2021-01-01 | End: 2021-01-01 | Stop reason: HOSPADM

## 2021-01-01 RX ORDER — HEPARIN SODIUM 1000 [USP'U]/ML
INJECTION, SOLUTION INTRAVENOUS; SUBCUTANEOUS CODE/TRAUMA/SEDATION MEDICATION
Status: COMPLETED | OUTPATIENT
Start: 2021-01-01 | End: 2021-01-01

## 2021-01-01 RX ORDER — LIDOCAINE AND PRILOCAINE 25; 25 MG/G; MG/G
CREAM TOPICAL AS NEEDED
Qty: 30 G | Refills: 3 | Status: SHIPPED | OUTPATIENT
Start: 2021-01-01 | End: 2022-01-01 | Stop reason: SDUPTHER

## 2021-01-01 RX ORDER — LIDOCAINE 50 MG/G
1 PATCH TOPICAL DAILY
Qty: 30 PATCH | Refills: 0 | Status: SHIPPED | OUTPATIENT
Start: 2021-01-01

## 2021-01-01 RX ORDER — PANTOPRAZOLE SODIUM 40 MG/1
40 TABLET, DELAYED RELEASE ORAL
Qty: 30 TABLET | Refills: 2 | Status: SHIPPED | OUTPATIENT
Start: 2021-01-01 | End: 2022-01-01

## 2021-01-01 RX ORDER — ACETAMINOPHEN 325 MG/1
650 TABLET ORAL EVERY 6 HOURS PRN
Status: DISCONTINUED | OUTPATIENT
Start: 2021-01-01 | End: 2021-01-01

## 2021-01-01 RX ORDER — LIDOCAINE HYDROCHLORIDE 10 MG/ML
INJECTION, SOLUTION EPIDURAL; INFILTRATION; INTRACAUDAL; PERINEURAL AS NEEDED
Status: DISCONTINUED | OUTPATIENT
Start: 2021-01-01 | End: 2021-01-01

## 2021-01-01 RX ADMIN — LIDOCAINE 5% 1 PATCH: 700 PATCH TOPICAL at 08:10

## 2021-01-01 RX ADMIN — SODIUM CHLORIDE 50 ML/HR: 0.9 INJECTION, SOLUTION INTRAVENOUS at 11:05

## 2021-01-01 RX ADMIN — LIDOCAINE 5% 1 PATCH: 700 PATCH TOPICAL at 09:30

## 2021-01-01 RX ADMIN — OXYCODONE HYDROCHLORIDE 2.5 MG: 5 TABLET ORAL at 15:32

## 2021-01-01 RX ADMIN — POLYETHYLENE GLYCOL 3350, SODIUM SULFATE ANHYDROUS, SODIUM BICARBONATE, SODIUM CHLORIDE, POTASSIUM CHLORIDE 4000 ML: 236; 22.74; 6.74; 5.86; 2.97 POWDER, FOR SOLUTION ORAL at 16:24

## 2021-01-01 RX ADMIN — BUMETANIDE 4 MG: 1 TABLET ORAL at 17:03

## 2021-01-01 RX ADMIN — Medication 6 ML: at 14:07

## 2021-01-01 RX ADMIN — LIDOCAINE HYDROCHLORIDE 2 ML: 10 INJECTION, SOLUTION INFILTRATION; PERINEURAL at 13:46

## 2021-01-01 RX ADMIN — SODIUM CHLORIDE 8 MG/HR: 9 INJECTION, SOLUTION INTRAVENOUS at 20:24

## 2021-01-01 RX ADMIN — FENTANYL CITRATE 25 MCG: 50 INJECTION INTRAMUSCULAR; INTRAVENOUS at 15:23

## 2021-01-01 RX ADMIN — MIDAZOLAM HYDROCHLORIDE 1 MG: 1 INJECTION, SOLUTION INTRAMUSCULAR; INTRAVENOUS at 14:09

## 2021-01-01 RX ADMIN — ACETAMINOPHEN 650 MG: 325 TABLET, FILM COATED ORAL at 23:53

## 2021-01-01 RX ADMIN — ACETAMINOPHEN 650 MG: 325 TABLET, FILM COATED ORAL at 12:33

## 2021-01-01 RX ADMIN — PROPOFOL 30 MG: 10 INJECTION, EMULSION INTRAVENOUS at 13:28

## 2021-01-01 RX ADMIN — PANTOPRAZOLE SODIUM 40 MG: 40 TABLET, DELAYED RELEASE ORAL at 15:32

## 2021-01-01 RX ADMIN — BUPIVACAINE HYDROCHLORIDE 2 ML: 5 INJECTION, SOLUTION EPIDURAL; INTRACAUDAL at 13:46

## 2021-01-01 RX ADMIN — LIDOCAINE 5% 1 PATCH: 700 PATCH TOPICAL at 12:42

## 2021-01-01 RX ADMIN — FENTANYL CITRATE 25 MCG: 50 INJECTION INTRAMUSCULAR; INTRAVENOUS at 15:05

## 2021-01-01 RX ADMIN — PHYTONADIONE 10 MG: 5 TABLET ORAL at 20:42

## 2021-01-01 RX ADMIN — PANTOPRAZOLE SODIUM 40 MG: 40 TABLET, DELAYED RELEASE ORAL at 17:03

## 2021-01-01 RX ADMIN — IOHEXOL 40 ML: 350 INJECTION, SOLUTION INTRAVENOUS at 17:03

## 2021-01-01 RX ADMIN — ATORVASTATIN CALCIUM 40 MG: 40 TABLET, FILM COATED ORAL at 08:09

## 2021-01-01 RX ADMIN — LIDOCAINE HYDROCHLORIDE 30 MG: 10 INJECTION, SOLUTION EPIDURAL; INFILTRATION; INTRACAUDAL; PERINEURAL at 13:15

## 2021-01-01 RX ADMIN — PANTOPRAZOLE SODIUM 40 MG: 40 INJECTION, POWDER, FOR SOLUTION INTRAVENOUS at 20:24

## 2021-01-01 RX ADMIN — PHYTONADIONE 5 MG: 10 INJECTION, EMULSION INTRAMUSCULAR; INTRAVENOUS; SUBCUTANEOUS at 20:10

## 2021-01-01 RX ADMIN — PHYTONADIONE 10 MG: 5 TABLET ORAL at 09:45

## 2021-01-01 RX ADMIN — HEPARIN SODIUM 4000 UNITS: 1000 INJECTION INTRAVENOUS; SUBCUTANEOUS at 15:21

## 2021-01-01 RX ADMIN — LIDOCAINE 5% 1 PATCH: 700 PATCH TOPICAL at 07:58

## 2021-01-01 RX ADMIN — PROPOFOL 20 MG: 10 INJECTION, EMULSION INTRAVENOUS at 13:23

## 2021-01-01 RX ADMIN — SODIUM CHLORIDE: 0.9 INJECTION, SOLUTION INTRAVENOUS at 13:09

## 2021-01-01 RX ADMIN — ATORVASTATIN CALCIUM 40 MG: 40 TABLET, FILM COATED ORAL at 08:19

## 2021-01-01 RX ADMIN — PROPOFOL 30 MG: 10 INJECTION, EMULSION INTRAVENOUS at 13:15

## 2021-01-01 RX ADMIN — MIDAZOLAM HYDROCHLORIDE 1 MG: 1 INJECTION, SOLUTION INTRAMUSCULAR; INTRAVENOUS at 14:48

## 2021-01-01 RX ADMIN — FENTANYL CITRATE 50 MCG: 50 INJECTION, SOLUTION INTRAMUSCULAR; INTRAVENOUS at 14:07

## 2021-01-01 RX ADMIN — LIDOCAINE HYDROCHLORIDE 5 ML: 10 INJECTION, SOLUTION EPIDURAL; INFILTRATION; INTRACAUDAL at 14:10

## 2021-01-01 RX ADMIN — ATORVASTATIN CALCIUM 40 MG: 40 TABLET, FILM COATED ORAL at 09:46

## 2021-01-01 RX ADMIN — ONDANSETRON 4 MG: 2 INJECTION INTRAMUSCULAR; INTRAVENOUS at 22:53

## 2021-01-01 RX ADMIN — LIDOCAINE HYDROCHLORIDE 5 ML: 10 INJECTION, SOLUTION EPIDURAL; INFILTRATION; INTRACAUDAL at 13:41

## 2021-01-01 RX ADMIN — MIDAZOLAM 1 MG: 1 INJECTION INTRAMUSCULAR; INTRAVENOUS at 15:23

## 2021-01-01 RX ADMIN — ALTEPLASE 3 MG: 2.2 INJECTION, POWDER, LYOPHILIZED, FOR SOLUTION INTRAVENOUS at 14:22

## 2021-01-01 RX ADMIN — PANTOPRAZOLE SODIUM 40 MG: 40 TABLET, DELAYED RELEASE ORAL at 09:46

## 2021-01-01 RX ADMIN — PANTOPRAZOLE SODIUM 40 MG: 40 TABLET, DELAYED RELEASE ORAL at 05:26

## 2021-01-01 RX ADMIN — TRIAMCINOLONE ACETONIDE 80 MG: 40 INJECTION, SUSPENSION INTRA-ARTICULAR; INTRAMUSCULAR at 13:46

## 2021-01-01 RX ADMIN — PANTOPRAZOLE SODIUM 40 MG: 40 TABLET, DELAYED RELEASE ORAL at 05:43

## 2021-01-01 RX ADMIN — MIDAZOLAM 1 MG: 1 INJECTION INTRAMUSCULAR; INTRAVENOUS at 15:05

## 2021-01-01 RX ADMIN — FENTANYL CITRATE 50 MCG: 50 INJECTION, SOLUTION INTRAMUSCULAR; INTRAVENOUS at 14:48

## 2021-01-01 RX ADMIN — PROPOFOL 20 MG: 10 INJECTION, EMULSION INTRAVENOUS at 13:16

## 2021-01-01 RX ADMIN — PANTOPRAZOLE SODIUM 40 MG: 40 TABLET, DELAYED RELEASE ORAL at 16:23

## 2021-01-01 RX ADMIN — IOHEXOL 60 ML: 350 INJECTION, SOLUTION INTRAVENOUS at 15:53

## 2021-01-01 RX ADMIN — PANTOPRAZOLE SODIUM 40 MG: 40 TABLET, DELAYED RELEASE ORAL at 08:19

## 2021-01-01 RX ADMIN — LIDOCAINE 5% 1 PATCH: 700 PATCH TOPICAL at 08:19

## 2021-01-05 ENCOUNTER — LAB (OUTPATIENT)
Dept: LAB | Facility: HOSPITAL | Age: 81
End: 2021-01-05
Attending: INTERNAL MEDICINE
Payer: COMMERCIAL

## 2021-01-05 ENCOUNTER — ANTICOAG VISIT (OUTPATIENT)
Dept: CARDIOLOGY CLINIC | Facility: CLINIC | Age: 81
End: 2021-01-05

## 2021-01-05 DIAGNOSIS — N18.4 STAGE 4 CHRONIC KIDNEY DISEASE (HCC): ICD-10-CM

## 2021-01-05 DIAGNOSIS — Z51.81 ANTICOAGULATION GOAL OF INR 2 TO 3: ICD-10-CM

## 2021-01-05 DIAGNOSIS — Z79.01 ANTICOAGULATION GOAL OF INR 2 TO 3: ICD-10-CM

## 2021-01-05 LAB
ALBUMIN SERPL BCP-MCNC: 2.9 G/DL (ref 3.5–5)
ANION GAP SERPL CALCULATED.3IONS-SCNC: 2 MMOL/L (ref 4–13)
BUN SERPL-MCNC: 78 MG/DL (ref 5–25)
CALCIUM ALBUM COR SERPL-MCNC: 10.1 MG/DL (ref 8.3–10.1)
CALCIUM SERPL-MCNC: 9.2 MG/DL (ref 8.3–10.1)
CHLORIDE SERPL-SCNC: 109 MMOL/L (ref 100–108)
CO2 SERPL-SCNC: 31 MMOL/L (ref 21–32)
CREAT SERPL-MCNC: 2.97 MG/DL (ref 0.6–1.3)
ERYTHROCYTE [DISTWIDTH] IN BLOOD BY AUTOMATED COUNT: 16.1 % (ref 11.6–15.1)
GFR SERPL CREATININE-BSD FRML MDRD: 19 ML/MIN/1.73SQ M
GLUCOSE P FAST SERPL-MCNC: 89 MG/DL (ref 65–99)
HCT VFR BLD AUTO: 30.1 % (ref 36.5–49.3)
HGB BLD-MCNC: 9.2 G/DL (ref 12–17)
MCH RBC QN AUTO: 29.8 PG (ref 26.8–34.3)
MCHC RBC AUTO-ENTMCNC: 30.6 G/DL (ref 31.4–37.4)
MCV RBC AUTO: 97 FL (ref 82–98)
PHOSPHATE SERPL-MCNC: 4.3 MG/DL (ref 2.3–4.1)
PLATELET # BLD AUTO: 117 THOUSANDS/UL (ref 149–390)
PMV BLD AUTO: 10.9 FL (ref 8.9–12.7)
POTASSIUM SERPL-SCNC: 4.6 MMOL/L (ref 3.5–5.3)
RBC # BLD AUTO: 3.09 MILLION/UL (ref 3.88–5.62)
SODIUM SERPL-SCNC: 142 MMOL/L (ref 136–145)
WBC # BLD AUTO: 5.5 THOUSAND/UL (ref 4.31–10.16)

## 2021-01-05 PROCEDURE — 85027 COMPLETE CBC AUTOMATED: CPT

## 2021-01-05 PROCEDURE — 80069 RENAL FUNCTION PANEL: CPT

## 2021-01-06 ENCOUNTER — TELEPHONE (OUTPATIENT)
Dept: NEPHROLOGY | Facility: CLINIC | Age: 81
End: 2021-01-06

## 2021-01-06 NOTE — TELEPHONE ENCOUNTER
----- Message from Meli Chaudhry MD sent at 1/5/2021  3:20 PM EST -----  Recent blood test reviewed, creatinine is slightly up at 2 97, previously 2 78 over 6 weeks ago  I have called patient, nobody answered, left a message  As long as his weight and leg swelling remains stable and patient remains asymptomatic, will continue with close follow-up  Please order repeat blood test before upcoming appointment in 2 months (CBC, renal function panel, PTH)  Please contact patient and convey that message    Thanks,      I cc note to patient's PCP to keep her updated

## 2021-01-07 ENCOUNTER — OFFICE VISIT (OUTPATIENT)
Dept: FAMILY MEDICINE CLINIC | Facility: CLINIC | Age: 81
End: 2021-01-07
Payer: COMMERCIAL

## 2021-01-07 ENCOUNTER — TELEPHONE (OUTPATIENT)
Dept: CARDIOLOGY CLINIC | Facility: CLINIC | Age: 81
End: 2021-01-07

## 2021-01-07 VITALS
OXYGEN SATURATION: 95 % | TEMPERATURE: 97.6 F | WEIGHT: 173.13 LBS | RESPIRATION RATE: 16 BRPM | SYSTOLIC BLOOD PRESSURE: 100 MMHG | HEART RATE: 64 BPM | DIASTOLIC BLOOD PRESSURE: 60 MMHG | HEIGHT: 67 IN | BODY MASS INDEX: 27.17 KG/M2

## 2021-01-07 DIAGNOSIS — D47.2 MONOCLONAL GAMMOPATHY: ICD-10-CM

## 2021-01-07 DIAGNOSIS — I50.33 ACUTE ON CHRONIC DIASTOLIC (CONGESTIVE) HEART FAILURE (HCC): ICD-10-CM

## 2021-01-07 DIAGNOSIS — N18.4 BENIGN HYPERTENSION WITH CKD (CHRONIC KIDNEY DISEASE) STAGE IV (HCC): ICD-10-CM

## 2021-01-07 DIAGNOSIS — R79.89 ABNORMAL TSH: ICD-10-CM

## 2021-01-07 DIAGNOSIS — N64.4 BREAST PAIN: ICD-10-CM

## 2021-01-07 DIAGNOSIS — N40.0 BENIGN PROSTATIC HYPERPLASIA WITHOUT LOWER URINARY TRACT SYMPTOMS: ICD-10-CM

## 2021-01-07 DIAGNOSIS — Z23 ENCOUNTER FOR IMMUNIZATION: Primary | ICD-10-CM

## 2021-01-07 DIAGNOSIS — Z00.00 MEDICARE ANNUAL WELLNESS VISIT, SUBSEQUENT: ICD-10-CM

## 2021-01-07 DIAGNOSIS — I12.9 BENIGN HYPERTENSION WITH CKD (CHRONIC KIDNEY DISEASE) STAGE IV (HCC): ICD-10-CM

## 2021-01-07 DIAGNOSIS — S22.23XD CLOSED STERNAL MANUBRIAL DISSOCIATION FRACTURE WITH ROUTINE HEALING: Primary | ICD-10-CM

## 2021-01-07 DIAGNOSIS — I10 BENIGN ESSENTIAL HYPERTENSION: Chronic | ICD-10-CM

## 2021-01-07 DIAGNOSIS — M06.9 RHEUMATOID ARTHRITIS, INVOLVING UNSPECIFIED SITE, UNSPECIFIED WHETHER RHEUMATOID FACTOR PRESENT (HCC): ICD-10-CM

## 2021-01-07 DIAGNOSIS — E78.5 HYPERLIPIDEMIA, UNSPECIFIED HYPERLIPIDEMIA TYPE: ICD-10-CM

## 2021-01-07 DIAGNOSIS — I48.0 PAROXYSMAL ATRIAL FIBRILLATION (HCC): ICD-10-CM

## 2021-01-07 PROCEDURE — 3725F SCREEN DEPRESSION PERFORMED: CPT | Performed by: NURSE PRACTITIONER

## 2021-01-07 PROCEDURE — G0439 PPPS, SUBSEQ VISIT: HCPCS | Performed by: NURSE PRACTITIONER

## 2021-01-07 PROCEDURE — 99214 OFFICE O/P EST MOD 30 MIN: CPT | Performed by: NURSE PRACTITIONER

## 2021-01-07 PROCEDURE — G0009 ADMIN PNEUMOCOCCAL VACCINE: HCPCS

## 2021-01-07 PROCEDURE — 1125F AMNT PAIN NOTED PAIN PRSNT: CPT | Performed by: NURSE PRACTITIONER

## 2021-01-07 PROCEDURE — 3288F FALL RISK ASSESSMENT DOCD: CPT | Performed by: NURSE PRACTITIONER

## 2021-01-07 PROCEDURE — 1170F FXNL STATUS ASSESSED: CPT | Performed by: NURSE PRACTITIONER

## 2021-01-07 PROCEDURE — 90732 PPSV23 VACC 2 YRS+ SUBQ/IM: CPT

## 2021-01-07 NOTE — TELEPHONE ENCOUNTER
Pt was seen today at PCP office by ASHU Bragg   She is calling with assessment of crackles, +1 pitting edema blle , but no SOB or CP  Daughter said 13 lb wt gain over last week  Per OV notes, wt is up 8 lbs since 12/10/20  Lab- 11/11/20 Cr- 2 78( 2 58 on 11/2/20) K- 3 6( 4 6)  Did take Metolazone on Friday  Did call daughter earlier to day and left message to call back  No return call yet        Please advise

## 2021-01-07 NOTE — PROGRESS NOTES
Assessment and Plan:     Problem List Items Addressed This Visit        Cardiovascular and Mediastinum    Benign essential hypertension (Chronic)      Blood pressure is stable on amlodipine and metoprolol  Benign hypertension with CKD (chronic kidney disease) stage IV [facility])     Lab Results   Component Value Date    EGFR 19 01/05/2021    EGFR 21 11/11/2020    EGFR 23 11/02/2020    CREATININE 2 97 (H) 01/05/2021    CREATININE 2 78 (H) 11/11/2020    CREATININE 2 58 (H) 11/02/2020     Creatinine 2 97 with last check on January 5th  Following with Nephrology, Dr Linda Bernard  He was on hemodialysis post thoracic aorta repair  Last HD treatment 2/2020  He expresses he would do hemodialysis again if needed  Paroxysmal atrial fibrillation (HCC)     Paroxysmal atrial fibrillation  Anticoagulated on Coumadin and metoprolol for rate control  Acute on chronic diastolic (congestive) heart failure (HCC)     Wt Readings from Last 3 Encounters:   01/11/21 78 9 kg (174 lb)   01/07/21 78 5 kg (173 lb 2 oz)   12/14/20 76 7 kg (169 lb)     On exam today, patient has +1 bilateral pitting lower extremity edema, bilateral crackles in bases, occasional cough  Granddaughter reports he gained 13 lb  She did give him Metaxalone  6 days ago with minimal improvement  Currently taking Bumex 4 mg twice daily  Weight gain of 4 lb in comparison to last weight in network to office visit today  Admits to noncompliance with low sodium diet  I have contacted his cardiologist's office regarding these findings, spoke to nurse Pop Maki, who will follow through with patient  Review of records shows he has been administered IV furosemide in cardiology office in the past                  Musculoskeletal and Integument    Rheumatoid arthritis ([address] )      Hand deformities consistent with rheumatoid arthritis  States he is not following with Rheumatology and does not desire to at the present time              Genitourinary    Benign prostatic hyperplasia without lower urinary tract symptoms     Stable without medications  Follows with urology, Dr Diamond Espinoza, at [facility]  Other    Abnormal TSH       Last TSH in 2018 4 820, mildly elevated  Will repeat TSH to monitor  Relevant Orders    TSH, 3rd generation    Hyperlipidemia      LDL 59   Stable on atorvastatin 40 mg daily  Due for repeat lipid panel  Relevant Orders    Lipid panel    Monoclonal gammopathy     Following with hematology oncology, Dr Heide Espinal  Monoclonal gammopathy thought to be related to rheumatoid arthritis inflammation  It is recommended to repeat CBC every 6-12 months  Other Visit Diagnoses     Encounter for immunization    -  Primary    Relevant Orders    PNEUMOCOCCAL POLYSACCHARIDE VACCINE 23-VALENT =>1YO SQ IM (Completed)    Breast pain        Relevant Orders    Mammo diagnostic bilateral w 3d & cad    Medicare annual wellness visit, subsequent               Preventive health issues were discussed with patient, and age appropriate screening tests were ordered as noted in patient's After Visit Summary  Personalized health advice and appropriate referrals for health education or preventive services given if needed, as noted in patient's After Visit Summary  History of Present Illness:     Patient presents for Medicare Annual Wellness visit    Patient Care Team:  Ky Cleary as PCP - General (Family Medicine)  Xavier Zamora MD as PCP - [address] [place] [address] (RTE)  Fredric Goldberg, MD Judge Carol, RN as RN Care Manager (Cardiology)  Scot Bingham, RN as  ([address])     Problem List:     Patient Active Problem List   Diagnosis    Rotator cuff tear arthropathy, right    Secondary osteoarthritis of right shoulder    Benign essential hypertension    Abnormal TSH    Overweight (BMI 25 0-29  9)    Lumbar pain    Benign hypertension with CKD (chronic kidney disease) stage IV (HCC)    Benign prostatic hyperplasia without lower urinary tract symptoms    Bradycardia    SOBOE (shortness of breath on exertion)    Dissection of thoracic aorta (HCC)    S/P aorta repair    Anticoagulation goal of INR 2 to 3    Hyperphosphatemia    Hypotension    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    Complication of vascular dialysis catheter    Hyperlipidemia    Sleep disorder    Encounter for post surgical wound check    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    Monoclonal gammopathy    Paroxysmal atrial fibrillation (HCC)    GONZALES (dyspnea on exertion)    Acute on chronic diastolic (congestive) heart failure (HCC)    Acute renal failure superimposed on stage 4 chronic kidney disease (HCC)    Anemia    Stage 4 chronic kidney disease (HCC)    Chronic kidney disease-mineral and bone disorder    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 ([facility] )    Rheumatoid arthritis ([facility] )      Past Medical and Surgical History:     Past Medical History:   Diagnosis Date    Arthritis     BPH without urinary obstruction     CKD (chronic kidney disease), stage III     baseline 1 6-1 7    GERD (gastroesophageal reflux disease)     Hyperlipidemia     Hypertension     MI (myocardial infarction) ([facility] )      Past Surgical History:   Procedure Laterality Date    APPENDECTOMY      CARDIAC SURGERY      COLONOSCOPY  2017    FRACTURE SURGERY      IR TEMPORARY DIALYSIS CATHETER PLACEMENT  12/23/2019    IR THORACENTESIS  12/24/2019    IR THORACENTESIS  12/27/2019    IR TUNNELED DIALYSIS CATHETER CHECK/CHANGE/REPOSITION/ANGIOPLASTY  1/6/2020    IR TUNNELED DIALYSIS CATHETER PLACEMENT  1/2/2020    IR TUNNELED DIALYSIS CATHETER REMOVAL  3/4/2020    PA ASCEND AORTA GRAFT W ROOT REPLACMENT  VALVE CONDUIT/CORON RECONSTRUCT N/A 12/15/2019    Procedure: BENTALL PROCEDURE (ASCENDING AORTIC REPAIR) with 26mm Gelweave Graft;  Surgeon: Krish Capellan DO;  [facility]: [facility];  Service: Cardiac Surgery    TX RECONSTR TOTAL SHOULDER IMPLANT Right 2017    Procedure: ARTHROPLASTY SHOULDER REVERSE with OPEN CYST EXCISION;  Surgeon: Jackelyn Coello MD;  [facility];  Service: Orthopedics    SHOULDER SURGERY      WRIST SURGERY        Family History:     Family History   Problem Relation Age of Onset    No Known Problems Mother     Hypertension Father     Arthritis Family     No Known Problems Daughter       Social History:     E-Cigarette/Vaping    E-Cigarette Use Former User      E-Cigarette/Vaping Substances    Nicotine No     THC No     CBD No     Flavoring No     Other No     Unknown No      Social History     Socioeconomic History    Marital status: /Civil Union     Spouse name: None    Number of children: None    Years of education: None    Highest education level: None   Occupational History    None   Social Needs    Financial resource strain: Not hard at all   [address] insecurity     Worry: Never true     Inability: Never true    Transportation needs     Medical: No     Non-medical: No   Tobacco Use    Smoking status: Former Smoker     Packs/day: 1 00     Quit date:      Years since quittin 0    Smokeless tobacco: Never Used   Substance and Sexual Activity    Alcohol use: Not Currently    Drug use: Not Currently    Sexual activity: Not Currently     Partners: Female   Lifestyle    Physical activity     Days per week: None     Minutes per session: None    Stress: None   Relationships    Social connections     Talks on phone: None     Gets together: None     Attends [employer] service: None     Active member of club or organization: None     Attends meetings of clubs or organizations: None     Relationship status: None    Intimate partner violence     Fear of current or ex partner: None     Emotionally abused: None     Physically abused: None     Forced sexual activity: None   Other Topics Concern    None   Social History Narrative    Daily caffeine consumption, 1 serving a day    Drinks coffee       Medications and Allergies:     Current Outpatient Medications   Medication Sig Dispense Refill    acetaminophen (TYLENOL) 325 mg tablet Take 1-2 tabs q6h PRN mild fever/pain 90 tablet 0    atorvastatin (LIPITOR) 40 mg tablet TAKE 1 TABLET (40 MG TOTAL) BY MOUTH DAILY 90 tablet 0    bumetanide (BUMEX) 2 mg tablet TAKE 2 TABLETS (4 MG TOTAL) BY MOUTH 2 (TWO) TIMES A DAY 60 tablet 3    diphenhydrAMINE (BENADRYL) 25 mg tablet Take 25 mg by mouth every 6 (six) hours as needed for itching      metolazone (ZAROXOLYN) 5 mg tablet TAKE 1 TABLET (5 MG TOTAL) BY MOUTH AS NEEDED (TAKE 1 TABLET 30 MINS BEFORE YOUR BUMEX FOR WEIGHT GAIN OF 3LBS IN ONE DAY OR 5LBS IN ONE WEEK  PLEASE TAKE AN EXTRA DOSE OF POTASSIUM WITH THIS ) 30 tablet 0    metoprolol tartrate (LOPRESSOR) 25 mg tablet Take 25 mg by mouth every 12 (twelve) hours      potassium chloride (K-DUR,KLOR-CON) 20 mEq tablet Take 1 tablet (20 mEq total) by mouth 2 (two) times a day 60 tablet 5    warfarin (COUMADIN) 2 mg tablet Anticoagulation needed for afib 30 tablet 0    amLODIPine (NORVASC) 5 mg tablet TAKE 1 TABLET (5 MG TOTAL) BY MOUTH DAILY 30 tablet 3     No current facility-administered medications for this visit        No Known Allergies   Immunizations:     Immunization History   Administered Date(s) Administered    INFLUENZA 10/16/2014, 09/02/2015, 08/15/2016, 09/15/2018    Influenza Quadrivalent Preservative Free 3 years and older IM 10/16/2014    Influenza Split High Dose Preservative Free IM 01/20/2015    Influenza, high dose seasonal 0 7 mL 10/28/2020    Pneumococcal Conjugate 13-Valent 04/17/2019    Pneumococcal Polysaccharide PPV23 01/07/2021    Tdap 05/01/2019    Zoster 01/20/2015    Zoster Vaccine Recombinant 09/15/2018, 04/17/2019    influenza, trivalent, adjuvanted 08/28/2019      Health Maintenance:         Topic Date Due    Hepatitis C Screening  Completed     There are no preventive care reminders to display for this patient  Medicare Health Risk Assessment:     /60   Pulse 64   Temp 97 6 °F (36 4 °C)   Resp 16   Ht 5' 7" (1 702 m)   Wt 78 5 kg (173 lb 2 oz)   SpO2 95%   BMI 27 12 kg/m²      Loyd Grover is here for his Subsequent Wellness visit  Health Risk Assessment:   Patient rates overall health as good  Patient feels that their physical health rating is same  Eyesight was rated as slightly worse  Hearing was rated as same  Patient feels that their emotional and mental health rating is same  Pain experienced in the last 7 days has been some  Patient's pain rating has been 6/10  Patient states that he has experienced no weight loss or gain in last 6 months  Depression Screening:   PHQ-2 Score: 0      Fall Risk Screening: In the past year, patient has experienced: no history of falling in past year      Home Safety:  Patient has trouble with stairs inside or outside of their home  Patient has working smoke alarms and has working carbon monoxide detector  Home safety hazards include: none  Nutrition:   Current diet is Regular  Medications:   Patient is currently taking over-the-counter supplements  OTC medications include: see medication list  Patient is able to manage medications  Activities of Daily Living (ADLs)/Instrumental Activities of Daily Living (IADLs):   Walk and transfer into and out of bed and chair?: Yes  Dress and groom yourself?: Yes    Bathe or shower yourself?: Yes    Feed yourself?  Yes  Do your laundry/housekeeping?: Yes  Manage your money, pay your bills and track your expenses?: Yes  Make your own meals?: Yes    Do your own shopping?: Yes    Previous Hospitalizations:   Any hospitalizations or ED visits within the last 12 months?: Yes    How many hospitalizations have you had in the last year?: 1-2    Advance Care Planning:   Living will: No    Durable POA for healthcare: No    Advanced directive: No      Comments: [employer] Wishes packet given    Cognitive Screening:   Provider or family/friend/caregiver concerned regarding cognition?: No    PREVENTIVE SCREENINGS      Cardiovascular Screening:    General: Screening Not Indicated and History Lipid Disorder      Diabetes Screening:     General: Screening Current      Colorectal Cancer Screening:     General: Screening Not Indicated      Prostate Cancer Screening:    General: Screening Not Indicated      Osteoporosis Screening:    General: Screening Not Indicated      Abdominal Aortic Aneurysm (AAA) Screening:    Risk factors include: tobacco use        General: Screening Not Indicated      Lung Cancer Screening:     General: Screening Not Indicated      Hepatitis C Screening:    General: Screening Current      Preventive Screening Comments: Shingrix, Tdap, Influenza, Prevnar vaccines up to date  Pneumovax given today        Mahsa Earl

## 2021-01-07 NOTE — PATIENT INSTRUCTIONS

## 2021-01-07 NOTE — TELEPHONE ENCOUNTER
Will wait for further details from patient's daughter prior to making any definitive recommendations  Creatinine and BUN are up a bit up on labs from this week (may be in setting of reported fluid retention?)  Patient has benefited from in-office IV Lasix in the past  Would offer them an acute visit with me/Jenelle or Dr Ryan Pandya to further assess, if patient and daughter agreeable

## 2021-01-07 NOTE — PROGRESS NOTES
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 [facility])     Lab Results   Component Value Date    EGFR 19 2021    EGFR 21 2020    EGFR 23 2020    CREATININE 2 97 (H) 2021    CREATININE 2 78 (H) 2020    CREATININE 2 58 (H) 2020     Creatinine 2 97 with last check on   Following with Nephrology, Dr Brenden Celeste  He was on hemodialysis post thoracic aorta repair  Last HD treatment 2020  He expresses he would do hemodialysis again if needed  Paroxysmal atrial fibrillation (HCC)     Paroxysmal atrial fibrillation  Anticoagulated on Coumadin and metoprolol for rate control  Acute on chronic diastolic (congestive) heart failure (HCC)     Wt Readings from Last 3 Encounters:   21 78 9 kg (174 lb)   21 78 5 kg (173 lb 2 oz)   20 76 7 kg (169 lb)     On exam today, patient has +1 bilateral pitting lower extremity edema, bilateral crackles in bases, occasional cough  Granddaughter reports he gained 13 lb  She did give him Metaxalone  6 days ago with minimal improvement  Currently taking Bumex 4 mg twice daily  Weight gain of 4 lb in comparison to last weight in network to office visit today  Admits to noncompliance with low sodium diet  I have contacted his cardiologist's office regarding these findings, spoke to nurse Cameron Mishra, who will follow through with patient  Review of records shows he has been administered IV furosemide in cardiology office in the past                  Musculoskeletal and Integument    Rheumatoid arthritis ([address] )      Hand deformities consistent with rheumatoid arthritis  States he is not following with Rheumatology and does not desire to at the present time  Genitourinary    Benign prostatic hyperplasia without lower urinary tract symptoms     Stable without medications  Follows with urology, Dr Nita See, at [facility]  Other    Abnormal TSH       Last TSH in 2018 4 820, mildly elevated  Will repeat TSH to monitor  Relevant Orders    TSH, 3rd generation    Hyperlipidemia      LDL 59   Stable on atorvastatin 40 mg daily  Due for repeat lipid panel  Relevant Orders    Lipid panel    Monoclonal gammopathy     Following with hematology oncology, Dr Ariel Fischer  Monoclonal gammopathy thought to be related to rheumatoid arthritis inflammation  It is recommended to repeat CBC every 6-12 months  Other Visit Diagnoses     Encounter for immunization    -  Primary    Relevant Orders    PNEUMOCOCCAL POLYSACCHARIDE VACCINE 23-VALENT =>1YO SQ IM (Completed)    Breast pain     Breast enlargement and tenderness over the past 1 5 months  Will check mammogram  Likely gynecomastia secondary to CKD, or possibly hypogonadism  Relevant Orders    Mammo diagnostic bilateral w 3d & cad    Medicare annual wellness visit, subsequent            Return to office in 3 months for follow up  Patient Instructions       Medicare Preventive Visit Patient Instructions  Thank you for completing your Welcome to Medicare Visit or Medicare Annual Wellness Visit today  Your next wellness visit will be due in one year (1/7/2022)  The screening/preventive services that you may require over the next 5-10 years are detailed below  Some tests may not apply to you based off risk factors and/or age  Screening tests ordered at today's visit but not completed yet may show as past due  Also, please note that scanned in results may not display below    Preventive Screenings:  Service Recommendations Previous Testing/Comments   Colorectal Cancer Screening  · Colonoscopy    · Fecal Occult Blood Test (FOBT)/Fecal Immunochemical Test (FIT)  · Fecal DNA/Cologuard Test  · Flexible Sigmoidoscopy Age: 54-65 years old   Colonoscopy: every 10 years (May be performed more frequently if at higher risk)  OR  FOBT/FIT: every 1 year  OR  Cologuard: every 3 years  OR  Sigmoidoscopy: every 5 years  Screening may be recommended earlier than age 48 if at higher risk for colorectal cancer  Also, an individualized decision between you and your healthcare provider will decide whether screening between the ages of 74-80 would be appropriate  Colonoscopy: Not on file  FOBT/FIT: 12/28/2019  Cologuard: Not on file  Sigmoidoscopy: Not on file         Prostate Cancer Screening Individualized decision between patient and health care provider in men between ages of 53-78   Medicare will cover every 12 months beginning on the day after your 50th birthday PSA: No results in last 5 years     Screening Not Indicated     Hepatitis C Screening Once for adults born between 1945 and 1965  More frequently in patients at high risk for Hepatitis C Hep C Antibody: 12/23/2019    Screening Current   Diabetes Screening 1-2 times per year if you're at risk for diabetes or have pre-diabetes Fasting glucose: 89 mg/dL   A1C: 5 3 %    Screening Current   Cholesterol Screening Once every 5 years if you don't have a lipid disorder  May order more often based on risk factors  Lipid panel: 05/08/2020    Screening Not Indicated  History Lipid Disorder      Other Preventive Screenings Covered by Medicare:  1  Abdominal Aortic Aneurysm (AAA) Screening: covered once if your at risk  You're considered to be at risk if you have a family history of AAA or a male between the age of 73-68 who smoking at least 100 cigarettes in your lifetime    2  Lung Cancer Screening: covers low dose CT scan once per year if you meet all of the following conditions: (1) Age 50-69; (2) No signs or symptoms of lung cancer; (3) Current smoker or have quit smoking within the last 15 years; (4) You have a tobacco smoking history of at least 30 pack years (packs per day x number of years you smoked); (5) You get a written order from a healthcare provider  3  Glaucoma Screening: covered annually if you're considered high risk: (1) You have diabetes OR (2) Family history of glaucoma OR (3)  aged 48 and older OR (3)  American aged 72 and older  3  Osteoporosis Screening: covered every 2 years if you meet one of the following conditions: (1) Have a vertebral abnormality; (2) On glucocorticoid therapy for more than 3 months; (3) Have primary hyperparathyroidism; (4) On osteoporosis medications and need to assess response to drug therapy  5  HIV Screening: covered annually if you're between the age of 12-76  Also covered annually if you are younger than 13 and older than 72 with risk factors for HIV infection  For pregnant patients, it is covered up to 3 times per pregnancy  Immunizations:  Immunization Recommendations   Influenza Vaccine Annual influenza vaccination during flu season is recommended for all persons aged >= 6 months who do not have contraindications   Pneumococcal Vaccine (Prevnar and Pneumovax)  * Prevnar = PCV13  * Pneumovax = PPSV23 Adults 25-60 years old: 1-3 doses may be recommended based on certain risk factors  Adults 72 years old: Prevnar (PCV13) vaccine recommended followed by Pneumovax (PPSV23) vaccine  If already received PPSV23 since turning 65, then PCV13 recommended at least one year after PPSV23 dose  Hepatitis B Vaccine 3 dose series if at intermediate or high risk (ex: diabetes, end stage renal disease, liver disease)   Tetanus (Td) Vaccine - COST NOT COVERED BY MEDICARE PART B Following completion of primary series, a booster dose should be given every 10 years to maintain immunity against tetanus  Td may also be given as tetanus wound prophylaxis     Tdap Vaccine - COST NOT COVERED BY MEDICARE PART B Recommended at least once for all adults  For pregnant patients, recommended with each pregnancy  Shingles Vaccine (Shingrix) - COST NOT COVERED BY MEDICARE PART B  2 shot series recommended in those aged 48 and above     Health Maintenance Due:      Topic Date Due    Hepatitis C Screening  Completed     Immunizations Due:      Topic Date Due    Pneumococcal Vaccine: 65+ Years (2 of 2 - PPSV23) 04/17/2020     Advance Directives   What are advance directives? Advance directives are legal documents that state your wishes and plans for medical care  These plans are made ahead of time in case you lose your ability to make decisions for yourself  Advance directives can apply to any medical decision, such as the treatments you want, and if you want to donate organs  What are the types of advance directives? There are many types of advance directives, and each state has rules about how to use them  You may choose a combination of any of the following:  · Living will: This is a written record of the treatment you want  You can also choose which treatments you do not want, which to limit, and which to stop at a certain time  This includes surgery, medicine, IV fluid, and tube feedings  · Durable power of  for healthcare [facility]): This is a written record that states who you want to make healthcare choices for you when you are unable to make them for yourself  This person, called a proxy, is usually a family member or a friend  You may choose more than 1 proxy  · Do not resuscitate (DNR) order:  A DNR order is used in case your heart stops beating or you stop breathing  It is a request not to have certain forms of treatment, such as CPR  A DNR order may be included in other types of advance directives  · Medical directive: This covers the care that you want if you are in a coma, near death, or unable to make decisions for yourself  You can list the treatments you want for each condition   Treatment may include pain medicine, surgery, blood transfusions, dialysis, IV or tube feedings, and a ventilator (breathing machine)  · Values history: This document has questions about your views, beliefs, and how you feel and think about life  This information can help others choose the care that you would choose  Why are advance directives important? An advance directive helps you control your care  Although spoken wishes may be used, it is better to have your wishes written down  Spoken wishes can be misunderstood, or not followed  Treatments may be given even if you do not want them  An advance directive may make it easier for your family to make difficult choices about your care  Weight Management   Why it is important to manage your weight:  Being overweight increases your risk of health conditions such as heart disease, high blood pressure, type 2 diabetes, and certain types of cancer  It can also increase your risk for osteoarthritis, sleep apnea, and other respiratory problems  Aim for a slow, steady weight loss  Even a small amount of weight loss can lower your risk of health problems  How to lose weight safely:  A safe and healthy way to lose weight is to eat fewer calories and get regular exercise  You can lose up about 1 pound a week by decreasing the number of calories you eat by 500 calories each day  Healthy meal plan for weight management:  A healthy meal plan includes a variety of foods, contains fewer calories, and helps you stay healthy  A healthy meal plan includes the following:  · Eat whole-grain foods more often  A healthy meal plan should contain fiber  Fiber is the part of grains, fruits, and vegetables that is not broken down by your body  Whole-grain foods are healthy and provide extra fiber in your diet  Some examples of whole-grain foods are whole-wheat breads and pastas, oatmeal, brown rice, and bulgur  · Eat a variety of vegetables every day    Include dark, leafy greens such as spinach, kale, enrique greens, and mustard greens  Eat yellow and orange vegetables such as carrots, sweet potatoes, and winter squash  · Eat a variety of fruits every day  Choose fresh or canned fruit (canned in its own juice or light syrup) instead of juice  Fruit juice has very little or no fiber  · Eat low-fat dairy foods  Drink fat-free (skim) milk or 1% milk  Eat fat-free yogurt and low-fat cottage cheese  Try low-fat cheeses such as mozzarella and other reduced-fat cheeses  · Choose meat and other protein foods that are low in fat  Choose beans or other legumes such as split peas or lentils  Choose fish, skinless poultry (chicken or turkey), or lean cuts of red meat (beef or pork)  Before you cook meat or poultry, cut off any visible fat  · Use less fat and oil  Try baking foods instead of frying them  Add less fat, such as margarine, sour cream, regular salad dressing and mayonnaise to foods  Eat fewer high-fat foods  Some examples of high-fat foods include french fries, doughnuts, ice cream, and cakes  · Eat fewer sweets  Limit foods and drinks that are high in sugar  This includes candy, cookies, regular soda, and sweetened drinks  Exercise:  Exercise at least 30 minutes per day on most days of the week  Some examples of exercise include walking, biking, dancing, and swimming  You can also fit in more physical activity by taking the stairs instead of the elevator or parking farther away from stores  Ask your healthcare provider about the best exercise plan for you  © Copyright [employer] 2018 Information is for End User's use only and may not be sold, redistributed or otherwise used for commercial purposes  All illustrations and images included in CareNotes® are the copyrighted property of [employer]  or [address]      1  Encounter for immunization  PNEUMOCOCCAL POLYSACCHARIDE VACCINE 23-VALENT =>1YO SQ IM   2  Breast pain  Mammo diagnostic bilateral w 3d & cad   3   Hyperlipidemia, unspecified hyperlipidemia type  Lipid panel   4  Abnormal TSH  TSH, 3rd generation   5  Acute on chronic diastolic (congestive) heart failure (HCC)     6  Paroxysmal atrial fibrillation ([facility] )     7  Benign essential hypertension     8  Benign hypertension with CKD (chronic kidney disease) stage IV ([facility] )     9  Benign prostatic hyperplasia without lower urinary tract symptoms     10  Monoclonal gammopathy     11  Rheumatoid arthritis, involving unspecified site, unspecified whether rheumatoid factor present ([facility] )     12  Medicare annual wellness visit, subsequent             Chief Complaint     Chief Complaint   Patient presents with   [facility] Establish Care     new pt , pt here with daugther     Chest Pain     x 2 month  , heart surgery last yr        History of Present Illness     Oliver Sharpe is a 68year old male with heart failure, stage IV CKD, hypertension, BPH, hyperlipidemia,  atrial fibrillation, monoclonal gammopathy, GERD presents today to establish care  He has history of thoracic aorta dissection with emergent repair in 2019  Previous PCP [place] [facility]  Specialists include:    Cardiology: Dr Bry Leung  Urology: Dr Nafisa Benjamin  Nephrology: Dr Evert Ahumada  Cardiothoracic surgery:  Orthopedics: Dr Michael Clement   Hematology: Dr Recinos Showarchana     He is accompanied by his granddaughter today  Primary language is [place], but he does speak and understand fairly good [place]  He granddaughter helps translate where needed  He has 3 children, one daughter recently  as a result of COVID-19  He lives with his wife who depends on [address] to help with her care  His wife has been approved for 70 hours of an in home caregiver  [address] has been approved for 30 hours of an in home caregiver  They are reluctant to have strangers in their home  They have found a close family friend to be his wife's caregiver, and are looking for someone they know to become Oliver's caregiver       He has had several hospitalizations for heart failure  He is tired of being in the hospital   Granddaughter states he has a heart failure nurse that comes to his house once a week to check on him  Aware he is supposed to limit sodium, granddaughter states "we are are [place], we can't avoid it "    Granddaughter reports he gained 13 pounds in 1 week  He does daily weights  Is taking his medications as prescribed  Lays flat in bed, 1 pillow  No paroxysmal nocturnal dyspnea  Denies shortness of breath, but notes when he has a lot of "water retention" he is short of breath  He has a slight cough  Jung Gray on Friday, did not notice much improvement  Anticoagulated on Coumadin  Complains of bilateral breast soreness for past 1 5 months with associated gynecomastia  Left more tender than right  Review of Systems   Review of Systems   Constitutional: Positive for unexpected weight change  Negative for chills, diaphoresis and fatigue  HENT: Negative  Eyes: Negative for visual disturbance  Respiratory: Positive for cough  Negative for chest tightness, shortness of breath and wheezing  Cardiovascular: Positive for leg swelling  Negative for chest pain and palpitations  Gastrointestinal: Negative  Genitourinary: Negative  Musculoskeletal: Positive for arthralgias (shoulder pain, states right shoulder spontaneously dislocates, he puts his own shoulder back in place  )  Skin: Negative  Neurological: Negative for dizziness, tremors, weakness, light-headedness, numbness and headaches  Hematological: Negative  Psychiatric/Behavioral: Negative  Active Problem List     Patient Active Problem List   Diagnosis    Rotator cuff tear arthropathy, right    Secondary osteoarthritis of right shoulder    Benign essential hypertension    Abnormal TSH    Overweight (BMI 25 0-29  9)    Lumbar pain    Benign hypertension with CKD (chronic kidney disease) stage IV (HCC)    Benign prostatic hyperplasia without lower urinary tract symptoms    Bradycardia    SOBOE (shortness of breath on exertion)    Dissection of thoracic aorta (HCC)    S/P aorta repair    Anticoagulation goal of INR 2 to 3    Hyperphosphatemia    Hypotension    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    Complication of vascular dialysis catheter    Hyperlipidemia    Sleep disorder    Encounter for post surgical wound check    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    Monoclonal gammopathy    Paroxysmal atrial fibrillation (HCC)    GONZALES (dyspnea on exertion)    Acute on chronic diastolic (congestive) heart failure (HCC)    Acute renal failure superimposed on stage 4 chronic kidney disease (HCC)    Anemia    Stage 4 chronic kidney disease (HCC)    Chronic kidney disease-mineral and bone disorder    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 ([facility] )    Rheumatoid arthritis ([facility] )       Past Medical History:  Past Medical History:   Diagnosis Date    Arthritis     BPH without urinary obstruction     CKD (chronic kidney disease), stage III     baseline 1 6-1 7    GERD (gastroesophageal reflux disease)     Hyperlipidemia     Hypertension     MI (myocardial infarction) ([facility] [address] )        Past Surgical History:  Past Surgical History:   Procedure Laterality Date    APPENDECTOMY      CARDIAC SURGERY      COLONOSCOPY  2017    FRACTURE SURGERY      IR TEMPORARY DIALYSIS CATHETER PLACEMENT  12/23/2019    IR THORACENTESIS  12/24/2019    IR THORACENTESIS  12/27/2019    IR TUNNELED DIALYSIS CATHETER CHECK/CHANGE/REPOSITION/ANGIOPLASTY  1/6/2020    IR TUNNELED DIALYSIS CATHETER PLACEMENT  1/2/2020    IR TUNNELED DIALYSIS CATHETER REMOVAL  3/4/2020    NJ ASCEND AORTA GRAFT W ROOT REPLACMENT  VALVE CONDUIT/CORON RECONSTRUCT N/A 12/15/2019    Procedure: BENTALL PROCEDURE (ASCENDING AORTIC REPAIR) with 26mm Gelweave Graft;  Surgeon: Edvin Linder DO;  [facility];  Service: Cardiac Surgery    MI RECONSTR TOTAL SHOULDER IMPLANT Right 2017    Procedure: ARTHROPLASTY SHOULDER REVERSE with OPEN CYST EXCISION;  Surgeon: Bridger Carreon MD;  [facility];  Service: Orthopedics    SHOULDER SURGERY      WRIST SURGERY         Family History:  Family History   Problem Relation Age of Onset    No Known Problems Mother     Hypertension Father     Arthritis Family     No Known Problems Daughter        Social History:  Social History     Socioeconomic History    Marital status: /Civil Union     Spouse name: Not on file    Number of children: Not on file    Years of education: Not on file    Highest education level: Not on file   Occupational History    Not on file   Social Needs    Financial resource strain: Not hard at all   [employer] insecurity     Worry: Never true     Inability: Never true   [employer] needs     Medical: No     Non-medical: No   Tobacco Use    Smoking status: Former Smoker     Packs/day: 1 00     Quit date:      Years since quittin 0    Smokeless tobacco: Never Used   Substance and Sexual Activity    Alcohol use: Not Currently    Drug use: Not Currently    Sexual activity: Not Currently     Partners: Female   Lifestyle    Physical activity     Days per week: Not on file     Minutes per session: Not on file    Stress: Not on file   Relationships    Social connections     Talks on phone: Not on file     Gets together: Not on file     Attends Congregation service: Not on file     Active member of club or organization: Not on file     Attends meetings of clubs or organizations: Not on file     Relationship status: Not on file    Intimate partner violence     Fear of current or ex partner: Not on file     Emotionally abused: Not on file     Physically abused: Not on file     Forced sexual activity: Not on file   Other Topics Concern    Not on file   Social History Narrative    Daily caffeine consumption, 1 serving a day    Drinks coffee        I have reviewed the patient's medical history in detail; there are no changes to the history as noted in the electronic medical record  Objective     Vitals:    21 0943   BP: 100/60   Pulse: 64   Resp: 16   Temp: 97 6 °F (36 4 °C)   SpO2: 95%   Weight: 78 5 kg (173 lb 2 oz)   Height: 5' 7" (1 702 m)     Wt Readings from Last 3 Encounters:   21 78 9 kg (174 lb)   21 78 5 kg (173 lb 2 oz)   20 76 7 kg (169 lb)     Physical Exam  Vitals signs and nursing note reviewed  Constitutional:       General: He is not in acute distress  Appearance: He is well-developed  He is ill-appearing (chronically ill appearing)  He is not toxic-appearing or diaphoretic  HENT:      Head: Normocephalic and atraumatic  Right Ear: Tympanic membrane and ear canal normal       Left Ear: Tympanic membrane and ear canal normal    Eyes:      Conjunctiva/sclera: Conjunctivae normal       Pupils: Pupils are equal, round, and reactive to light  Neck:      Musculoskeletal: Normal range of motion and neck supple  Thyroid: No thyromegaly  Vascular: No carotid bruit  Cardiovascular:      Rate and Rhythm: Normal rate and regular rhythm  Heart sounds: No murmur  Pulmonary:      Effort: Pulmonary effort is normal  No respiratory distress  Breath sounds: Rales (bilateral bases) present  No wheezing  Comments: Bilateral gynecomastia and tenderness  No palpable masses  No skin changes or discoloration  No axillary lymphadenopathy  Abdominal:      General: Abdomen is flat  Bowel sounds are normal       Palpations: Abdomen is soft  Musculoskeletal:      Right lower le+ Pitting Edema present  Left lower le+ Pitting Edema present  Comments: Lutz neck deformity and ulnar deviation of metacarpophalangeal joints, of fingers on bilateral hands  Skin:     General: Skin is warm and dry  Findings: No rash     Neurological:      General: No focal deficit present  Mental Status: He is alert  Psychiatric:         Mood and Affect: Mood normal        BMI Counseling: Body mass index is 27 12 kg/m²  The BMI is above normal  Nutrition recommendations include decreasing portion sizes  Low sodium diet  ALLERGIES:  No Known Allergies    Current Medications     Current Outpatient Medications   Medication Sig Dispense Refill    acetaminophen (TYLENOL) 325 mg tablet Take 1-2 tabs q6h PRN mild fever/pain 90 tablet 0    atorvastatin (LIPITOR) 40 mg tablet TAKE 1 TABLET (40 MG TOTAL) BY MOUTH DAILY 90 tablet 0    bumetanide (BUMEX) 2 mg tablet TAKE 2 TABLETS (4 MG TOTAL) BY MOUTH 2 (TWO) TIMES A DAY 60 tablet 3    diphenhydrAMINE (BENADRYL) 25 mg tablet Take 25 mg by mouth every 6 (six) hours as needed for itching      metolazone (ZAROXOLYN) 5 mg tablet TAKE 1 TABLET (5 MG TOTAL) BY MOUTH AS NEEDED (TAKE 1 TABLET 30 MINS BEFORE YOUR BUMEX FOR WEIGHT GAIN OF 3LBS IN ONE DAY OR 5LBS IN ONE WEEK  PLEASE TAKE AN EXTRA DOSE OF POTASSIUM WITH THIS ) 30 tablet 0    metoprolol tartrate (LOPRESSOR) 25 mg tablet Take 25 mg by mouth every 12 (twelve) hours      potassium chloride (K-DUR,KLOR-CON) 20 mEq tablet Take 1 tablet (20 mEq total) by mouth 2 (two) times a day 60 tablet 5    warfarin (COUMADIN) 2 mg tablet Anticoagulation needed for afib 30 tablet 0    amLODIPine (NORVASC) 5 mg tablet TAKE 1 TABLET (5 MG TOTAL) BY MOUTH DAILY 30 tablet 3     No current facility-administered medications for this visit            Health Maintenance     Health Maintenance   Topic Date Due   [facility] Annual Wellness Visit (AWV)  1940    COVID-19 Vaccine (1 of 2) 03/23/1958    BMI: Followup Plan  03/23/1960    Fall Risk  01/07/2022    Depression Screening PHQ  01/07/2022    BMI: Adult  01/11/2022    DTaP,Tdap,and Td Vaccines (2 - Td) 05/01/2029    Hepatitis C Screening  Completed    Pneumococcal Vaccine: 65+ Years  Completed    Influenza Vaccine  Completed    HIB Vaccine  Aged Out    Hepatitis B Vaccine  Aged Out    IPV Vaccine  Aged Out    Hepatitis A Vaccine  Aged Out    Meningococcal ACWY Vaccine  Aged Out    HPV Vaccine  Aged Out     Immunization History   Administered Date(s) Administered    INFLUENZA 10/16/2014, 09/02/2015, 08/15/2016, 09/15/2018    Influenza Quadrivalent Preservative Free 3 years and older IM 10/16/2014    Influenza Split High Dose Preservative Free IM 01/20/2015    Influenza, high dose seasonal 0 7 mL 10/28/2020    Pneumococcal Conjugate 13-Valent 04/17/2019    Pneumococcal Polysaccharide PPV23 01/07/2021    Tdap 05/01/2019    Zoster 01/20/2015    Zoster Vaccine Recombinant 09/15/2018, 04/17/2019    influenza, trivalent, adjuvanted 08/28/2019       EL Serrano

## 2021-01-08 ENCOUNTER — HOSPITAL ENCOUNTER (OUTPATIENT)
Dept: RADIOLOGY | Facility: HOSPITAL | Age: 81
Discharge: HOME | End: 2021-01-08
Payer: COMMERCIAL

## 2021-01-08 ENCOUNTER — TRANSCRIBE ORDERS (OUTPATIENT)
Dept: RADIOLOGY | Facility: HOSPITAL | Age: 81
End: 2021-01-08

## 2021-01-08 DIAGNOSIS — S22.23XD CLOSED STERNAL MANUBRIAL DISSOCIATION FRACTURE WITH ROUTINE HEALING: ICD-10-CM

## 2021-01-08 PROCEDURE — 71250 CT THORAX DX C-: CPT

## 2021-01-08 PROCEDURE — G1004 CDSM NDSC: HCPCS

## 2021-01-11 ENCOUNTER — OFFICE VISIT (OUTPATIENT)
Dept: CARDIAC SURGERY | Facility: CLINIC | Age: 81
End: 2021-01-11
Payer: COMMERCIAL

## 2021-01-11 VITALS
TEMPERATURE: 96.7 F | SYSTOLIC BLOOD PRESSURE: 94 MMHG | OXYGEN SATURATION: 100 % | DIASTOLIC BLOOD PRESSURE: 56 MMHG | HEART RATE: 44 BPM | WEIGHT: 174 LBS | BODY MASS INDEX: 27.31 KG/M2 | RESPIRATION RATE: 18 BRPM | HEIGHT: 67 IN

## 2021-01-11 DIAGNOSIS — M96.89 NONUNION OF STERNUM AFTER STERNOTOMY: Primary | ICD-10-CM

## 2021-01-11 PROCEDURE — 99214 OFFICE O/P EST MOD 30 MIN: CPT | Performed by: NURSE PRACTITIONER

## 2021-01-11 NOTE — PROGRESS NOTES
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  He had a prolonged hospital course, hospitalized for 24 hours after surgery  He developed acute renal failure requiring HD, post op Afib and underwent L thoracentesis (1 5 L)  He was seen in our office on 2/11/20 for a post op visit at which time he was noted to have non-union of his sternotomy with dehiscence seen on non-contrast chest CT scan  Conservative management was recommended as patient had just been transitioned off of HD  He was seen again for follow up 6/1/20 with continued non-tender sternal instability  Again, conservative management recommended considering his age and comobidities  Today patient reports to the office at his request  He reports an increase in sternal pain and sternal motion, mostly when he is side lying over the past 1- 1 5 months  He denies fever, chills, change in integrity of his chest incision scar, protrusion of sternal wires  He denies SOB, GONZALES, lightheadedness, palpitations, PND or orthopnea  He continues to make urine  His most recent creatine is 2 97/GFR 19  He tolerates his activities without difficulty  Appetite good  Weight stable       Past Medical History:  Past Medical History:   Diagnosis Date    Arthritis     BPH without urinary obstruction     CKD (chronic kidney disease), stage III     baseline 1 6-1 7    GERD (gastroesophageal reflux disease)     Hyperlipidemia     Hypertension     MI (myocardial infarction) ([facility] [address] )          Past Surgical History:   Past Surgical History:   Procedure Laterality Date    APPENDECTOMY      CARDIAC SURGERY      COLONOSCOPY  2017    FRACTURE SURGERY      IR TEMPORARY DIALYSIS CATHETER PLACEMENT  2019    IR THORACENTESIS  2019    IR THORACENTESIS  2019    IR TUNNELED DIALYSIS CATHETER CHECK/CHANGE/REPOSITION/ANGIOPLASTY  2020    IR TUNNELED DIALYSIS CATHETER PLACEMENT  2020    IR TUNNELED DIALYSIS CATHETER REMOVAL  3/4/2020    TX ASCEND AORTA GRAFT W ROOT REPLACMENT  VALVE CONDUIT/CORON RECONSTRUCT N/A 12/15/2019    Procedure: BENTALL PROCEDURE (ASCENDING AORTIC REPAIR) with 26mm Gelweave Graft;  Surgeon: Frederick Bell DO;  [facility];  Service: Cardiac Surgery    TX RECONSTR TOTAL SHOULDER IMPLANT Right 2017    Procedure: ARTHROPLASTY SHOULDER REVERSE with OPEN CYST EXCISION;  Surgeon: Jeremy Crockett MD;  [facility];  Service: Orthopedics    SHOULDER SURGERY      WRIST SURGERY           Family History:  Family History   Problem Relation Age of Onset    No Known Problems Mother     Hypertension Father     Arthritis Family     No Known Problems Daughter          Social History:    Social History     Substance and Sexual Activity   Alcohol Use Not Currently     Social History     Substance and Sexual Activity   Drug Use Not Currently     Social History     Tobacco Use   Smoking Status Former Smoker    Packs/day: 1 00    Quit date: 36    Years since quittin 0   Smokeless Tobacco Never Used         Home Medications:   Prior to Admission medications    Medication Sig Start Date End Date Taking?  Authorizing Provider   acetaminophen (TYLENOL) 325 mg tablet Take 1-2 tabs q6h PRN mild fever/pain 20  Yes Mame Mena PA-C   amLODIPine (NORVASC) 5 mg tablet TAKE 1 TABLET (5 MG TOTAL) BY MOUTH DAILY 20  Yes EL Carter   atorvastatin (LIPITOR) 40 mg tablet TAKE 1 TABLET (40 MG TOTAL) BY MOUTH DAILY 20  Yes Chucho Caballero MD   bumetanide (BUMEX) 2 mg tablet TAKE 2 TABLETS (4 MG TOTAL) BY MOUTH 2 (TWO) TIMES A DAY 20  Yes EL Sanabria   diphenhydrAMINE (BENADRYL) 25 mg tablet Take 25 mg by mouth every 6 (six) hours as needed for itching   Yes Historical Provider, MD   metolazone (ZAROXOLYN) 5 mg tablet TAKE 1 TABLET (5 MG TOTAL) BY MOUTH AS NEEDED (TAKE 1 TABLET 30 MINS BEFORE YOUR BUMEX FOR WEIGHT GAIN OF 3LBS IN ONE DAY OR 5LBS IN ONE WEEK  PLEASE TAKE AN EXTRA DOSE OF POTASSIUM WITH THIS ) 12/27/20  Yes EL Sanabria   metoprolol tartrate (LOPRESSOR) 25 mg tablet Take 25 mg by mouth every 12 (twelve) hours   Yes Historical Provider, MD   potassium chloride (K-DUR,KLOR-CON) 20 mEq tablet Take 1 tablet (20 mEq total) by mouth 2 (two) times a day 11/5/20  Yes EL Sanabria   warfarin (COUMADIN) 2 mg tablet Anticoagulation needed for afib 10/9/20  Yes Shiv Calles DO   bumetanide (BUMEX) 2 mg tablet Take 2 tablets (4 mg total) by mouth 2 (two) times a day 10/30/20   EL Spencer       Allergies:  No Known Allergies    Review of Systems:   Review of Systems - History obtained from chart review and the patient  General ROS: negative  Psychological ROS: negative  Ophthalmic ROS: negative  ENT ROS: negative  Allergy and Immunology ROS: negative  Hematological and Lymphatic ROS: negative  Endocrine ROS: negative  Respiratory ROS: no cough, shortness of breath, or wheezing  Cardiovascular ROS: no chest pain or dyspnea on exertion  Gastrointestinal ROS: no abdominal pain, change in bowel habits, or black or bloody stools  Genito-Urinary ROS: no dysuria, trouble voiding, or hematuria  Musculoskeletal ROS: positive for - sternal movement, sternal pain with side lying    Neurological ROS: no TIA or stroke symptoms  Dermatological ROS: negative    Vital Signs:   Vitals:    01/11/21 0831 01/11/21 0836   BP: 90/60 94/56   BP Location: Left arm Right arm   Patient Position: Sitting Sitting   Cuff Size: Adult Adult   Pulse:  (!) 44   Resp: 18    Temp: (!) 96 7 °F (35 9 °C)    TempSrc: Tympanic    SpO2: 100%    Weight: 78 9 kg (174 lb)    Height: 5' 7" (1 702 m) Physical Exam:  General: Alert, oriented, well developed, good color, no acute distress  HEENT/NECK:  PERRLA  No jugular venous distention  Cardiac:Bradycardic, regular rhythm, no murmurs rubs or gallops  Carotid arteries: 2+ pulses, no bruits  Pulmonary:  Breath sounds clear bilaterally  Abdomen:  Non-tender, Non-distended  Positive bowel sounds  Upper extremities: 2+ radial pulses; brisk capillary refill; hands warm  Lower extremities: Extremities warm/dry  PT/DP pulses 2+ bilaterally  Trace edema B/L  Neuro: Alert and oriented X 3  Sensation is grossly intact  No focal deficits  Musculoskeletal: MAEE  No deficits  Stable gait  Skin: Warm/Dry, without rashes or lesions      Lab Results:     Results from last 7 days   Lab Units 01/05/21  1018   WBC Thousand/uL 5 50   HEMOGLOBIN g/dL 9 2*   HEMATOCRIT % 30 1*   PLATELETS Thousands/uL 117*     Results from last 7 days   Lab Units 01/05/21  1018   POTASSIUM mmol/L 4 6   CHLORIDE mmol/L 109*   CO2 mmol/L 31   BUN mg/dL 78*   CREATININE mg/dL 2 97*   CALCIUM mg/dL 9 2     Results from last 7 days   Lab Units 01/05/21  1018   INR  2 75*     Lab Results   Component Value Date    HGBA1C 5 3 11/17/2017     Lab Results   Component Value Date    TROPONINI 0 02 10/04/2020       Imaging Studies:     CT Chest: 1/8/20  Report pending  Sternal non-union      I have personally reviewed pertinent films in PACS    Assessment:  Patient Active Problem List    Diagnosis Date Noted    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 ([facility] ) 12/14/2020    Stage 4 chronic kidney disease ([facility] [address] ) 11/04/2020    Chronic kidney disease-mineral and bone disorder 11/04/2020    Anemia 10/05/2020    Acute on chronic diastolic (congestive) heart failure ([facility] [address] ) 10/04/2020    Acute renal failure superimposed on stage 4 chronic kidney disease ([facility] [address] ) 10/04/2020    Cough with exposure to COVID-19 virus 07/08/2020    GONZALES (dyspnea on exertion) 05/08/2020    Paroxysmal atrial fibrillation ([facility] ) 04/17/2020    Monoclonal gammopathy 03/11/2020    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 02/24/2020    Encounter for post surgical wound check 02/11/2020    Sleep disorder 01/24/2020    Hyperlipidemia 04/48/8768    Complication of vascular dialysis catheter 01/05/2020    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 12/31/2019    Hyperphosphatemia 12/30/2019    Hypotension 12/30/2019    Anticoagulation goal of INR 2 to 3 12/20/2019    Postoperative anemia due to acute blood loss 12/19/2019    Dissection of thoracic aorta ([facility] ) 12/15/2019    S/P aorta repair 12/15/2019    Bradycardia 04/23/2019    SOBOE (shortness of breath on exertion) 04/23/2019    Benign hypertension with CKD (chronic kidney disease) stage IV ([facility] ) 01/09/2019    Benign prostatic hyperplasia without lower urinary tract symptoms 01/09/2019    Lumbar pain 11/11/2018    Abnormal TSH 02/23/2018    Overweight (BMI 25 0-29 9) 02/23/2018    Aftercare following surgery of the musculoskeletal system 02/19/2018    Rotator cuff tear arthropathy, right 12/05/2017    Secondary osteoarthritis of right shoulder 12/05/2017    Benign essential hypertension 10/03/2012       Plan:    CT imaging performed prior to this visit demonstrates chronic sternal non-union  Surgical options: sternal plating, muscle fla or sternal plating + muscle flap discussed with patient  Patients age and renal status presents risk for general anesthesia, therefore we recommend continued conservative management and not surgical intervention  Apco Core was comfortable with our recommendations, and his questions were answered to his satisfaction  No future follow up recommended       Routine referral to gastroenterology for colonoscopy screening was not indicated, as the patient is over 78years old    SIGNATURE: EL Spence  DATE: January 11, 2021  TIME: 8:54 AM

## 2021-01-11 NOTE — LETTER
January 11, 2021     Radha Pascual, [address] [address]    Patient: Srinivas Sharpe   YOB: 1940   Date of Visit: 1/11/2021       Dear Dr Jevon Koch Recipients: Thank you for referring [facility] to me for evaluation  Below are my notes for this consultation  If you have questions, please do not hesitate to call me  I look forward to following your patient along with you  Sincerely,        Dolly Alan DO        CC: No Recipients  EL Etienne  1/11/2021  9:14 AM  Attested  Cardiovascular Surgery Follow up  Srinivas Sharpe 68 y o  male MRN: [id number]      Reason for Consult / Principal Problem: sternal non-union    History of Present Illness: Elham Arenas is a 68y o  year old male know to our service  He is s/p emergent ascending aortic replacement/resuspension of aortic valve following an 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  He had a prolonged hospital course, hospitalized for 24 hours after surgery  He developed acute renal failure requiring HD, post op Afib and underwent L thoracentesis (1 5 L)  He was seen in our office on 2/11/20 for a post op visit at which time he was noted to have non-union of his sternotomy with dehiscence seen on non-contrast chest CT scan  Conservative management was recommended as patient had just been transitioned off of HD  He was seen again for follow up 6/1/20 with continued non-tender sternal instability  Again, conservative management recommended considering his age and comobidities  Today patient reports to the office at his request  He reports an increase in sternal pain and sternal motion, mostly when he is side lying over the past 1- 1 5 months  He denies fever, chills, change in integrity of his chest incision scar, protrusion of sternal wires  He denies SOB, GONZALES, lightheadedness, palpitations, PND or orthopnea  He continues to make urine  His most recent creatine is 2 97/GFR 19   He tolerates his activities without difficulty  Appetite good  Weight stable  Past Medical History:  Past Medical History:   Diagnosis Date    Arthritis     BPH without urinary obstruction     CKD (chronic kidney disease), stage III     baseline 1 6-1 7    GERD (gastroesophageal reflux disease)     Hyperlipidemia     Hypertension     MI (myocardial infarction) ([facility] [address] )          Past Surgical History:   Past Surgical History:   Procedure Laterality Date    APPENDECTOMY      CARDIAC SURGERY      COLONOSCOPY  2017    FRACTURE SURGERY      IR TEMPORARY DIALYSIS CATHETER PLACEMENT  2019    IR THORACENTESIS  2019    IR THORACENTESIS  2019    IR TUNNELED DIALYSIS CATHETER CHECK/CHANGE/REPOSITION/ANGIOPLASTY  2020    IR TUNNELED DIALYSIS CATHETER PLACEMENT  2020    IR TUNNELED DIALYSIS CATHETER REMOVAL  3/4/2020    VT ASCEND AORTA GRAFT W ROOT REPLACMENT  VALVE CONDUIT/CORON RECONSTRUCT N/A 12/15/2019    Procedure: BENTALL PROCEDURE (ASCENDING AORTIC REPAIR) with 26mm Gelweave Graft;  Surgeon: Cornel Tobias DO;  [facility];  Service: Cardiac Surgery    VT RECONSTR TOTAL SHOULDER IMPLANT Right 2017    Procedure: ARTHROPLASTY SHOULDER REVERSE with OPEN CYST EXCISION;  Surgeon: Marisabel Persaud MD;  [facility];  Service: Orthopedics    SHOULDER SURGERY      WRIST SURGERY           Family History:  Family History   Problem Relation Age of Onset    No Known Problems Mother     Hypertension Father     Arthritis Family     No Known Problems Daughter          Social History:    Social History     Substance and Sexual Activity   Alcohol Use Not Currently     Social History     Substance and Sexual Activity   Drug Use Not Currently     Social History     Tobacco Use   Smoking Status Former Smoker    Packs/day: 1 00    Quit date: 36    Years since quittin 0   Smokeless Tobacco Never Used         Home Medications:   Prior to Admission medications    Medication Sig Start Date End Date Taking? Authorizing Provider   acetaminophen (TYLENOL) 325 mg tablet Take 1-2 tabs q6h PRN mild fever/pain 1/7/20  Yes Ishaan Robertson PA-C   amLODIPine (NORVASC) 5 mg tablet TAKE 1 TABLET (5 MG TOTAL) BY MOUTH DAILY 12/29/20  Yes EL Hightower   atorvastatin (LIPITOR) 40 mg tablet TAKE 1 TABLET (40 MG TOTAL) BY MOUTH DAILY 8/28/20  Yes Carrie Puri MD   bumetanide (BUMEX) 2 mg tablet TAKE 2 TABLETS (4 MG TOTAL) BY MOUTH 2 (TWO) TIMES A DAY 12/29/20  Yes EL Sanabria   diphenhydrAMINE (BENADRYL) 25 mg tablet Take 25 mg by mouth every 6 (six) hours as needed for itching   Yes Historical Provider, MD   metolazone (ZAROXOLYN) 5 mg tablet TAKE 1 TABLET (5 MG TOTAL) BY MOUTH AS NEEDED (TAKE 1 TABLET 30 MINS BEFORE YOUR BUMEX FOR WEIGHT GAIN OF 3LBS IN ONE DAY OR 5LBS IN ONE WEEK   PLEASE TAKE AN EXTRA DOSE OF POTASSIUM WITH THIS ) 12/27/20  Yes EL Sanabria   metoprolol tartrate (LOPRESSOR) 25 mg tablet Take 25 mg by mouth every 12 (twelve) hours   Yes Historical Provider, MD   potassium chloride (K-DUR,KLOR-CON) 20 mEq tablet Take 1 tablet (20 mEq total) by mouth 2 (two) times a day 11/5/20  Yes EL Sanabria   warfarin (COUMADIN) 2 mg tablet Anticoagulation needed for afib 10/9/20  Yes Cody Palomares DO   bumetanide (BUMEX) 2 mg tablet Take 2 tablets (4 mg total) by mouth 2 (two) times a day 10/30/20   EL Luis       Allergies:  No Known Allergies    Review of Systems:   Review of Systems - History obtained from chart review and the patient  General ROS: negative  Psychological ROS: negative  Ophthalmic ROS: negative  ENT ROS: negative  Allergy and Immunology ROS: negative  Hematological and Lymphatic ROS: negative  Endocrine ROS: negative  Respiratory ROS: no cough, shortness of breath, or wheezing  Cardiovascular ROS: no chest pain or dyspnea on exertion  Gastrointestinal ROS: no abdominal pain, change in bowel habits, or black or bloody stools  Genito-Urinary ROS: no dysuria, trouble voiding, or hematuria  Musculoskeletal ROS: positive for - sternal movement, sternal pain with side lying  Neurological ROS: no TIA or stroke symptoms  Dermatological ROS: negative    Vital Signs:   Vitals:    01/11/21 0831 01/11/21 0836   BP: 90/60 94/56   BP Location: Left arm Right arm   Patient Position: Sitting Sitting   Cuff Size: Adult Adult   Pulse:  (!) 44   Resp: 18    Temp: (!) 96 7 °F (35 9 °C)    TempSrc: Tympanic    SpO2: 100%    Weight: 78 9 kg (174 lb)    Height: 5' 7" (1 702 m)        Physical Exam:  General: Alert, oriented, well developed, good color, no acute distress  HEENT/NECK:  PERRLA  No jugular venous distention  Cardiac:Bradycardic, regular rhythm, no murmurs rubs or gallops  Carotid arteries: 2+ pulses, no bruits  Pulmonary:  Breath sounds clear bilaterally  Abdomen:  Non-tender, Non-distended  Positive bowel sounds  Upper extremities: 2+ radial pulses; brisk capillary refill; hands warm  Lower extremities: Extremities warm/dry  PT/DP pulses 2+ bilaterally  Trace edema B/L  Neuro: Alert and oriented X 3  Sensation is grossly intact  No focal deficits  Musculoskeletal: MAEE  No deficits  Stable gait  Skin: Warm/Dry, without rashes or lesions      Lab Results:     Results from last 7 days   Lab Units 01/05/21  1018   WBC Thousand/uL 5 50   HEMOGLOBIN g/dL 9 2*   HEMATOCRIT % 30 1*   PLATELETS Thousands/uL 117*     Results from last 7 days   Lab Units 01/05/21  1018   POTASSIUM mmol/L 4 6   CHLORIDE mmol/L 109*   CO2 mmol/L 31   BUN mg/dL 78*   CREATININE mg/dL 2 97*   CALCIUM mg/dL 9 2     Results from last 7 days   Lab Units 01/05/21  1018   INR  2 75*     Lab Results   Component Value Date    HGBA1C 5 3 11/17/2017     Lab Results   Component Value Date    TROPONINI 0 02 10/04/2020       Imaging Studies:     CT Chest: 1/8/20  Report pending  Sternal non-union      I have personally reviewed pertinent films in PACS    Assessment:  Patient Active Problem List    Diagnosis Date Noted    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 ([place] [address] ) 12/14/2020    Stage 4 chronic kidney disease ([place] [address] ) 11/04/2020    Chronic kidney disease-mineral and bone disorder 11/04/2020    Anemia 10/05/2020    Acute on chronic diastolic (congestive) heart failure ([facility] ) 10/04/2020    Acute renal failure superimposed on stage 4 chronic kidney disease ([place] [address] ) 10/04/2020    Cough with exposure to COVID-19 virus 07/08/2020    GONZALES (dyspnea on exertion) 05/08/2020    Paroxysmal atrial fibrillation ([place] [address] ) 04/17/2020    Monoclonal gammopathy 03/11/2020    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 02/24/2020    Encounter for post surgical wound check 02/11/2020    Sleep disorder 01/24/2020    Hyperlipidemia 54/16/8279    Complication of vascular dialysis catheter 01/05/2020    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 12/31/2019    Hyperphosphatemia 12/30/2019    Hypotension 12/30/2019    Anticoagulation goal of INR 2 to 3 12/20/2019    Postoperative anemia due to acute blood loss 12/19/2019    Dissection of thoracic aorta ([place] [address] ) 12/15/2019    S/P aorta repair 12/15/2019    Bradycardia 04/23/2019    SOBOE (shortness of breath on exertion) 04/23/2019    Benign hypertension with CKD (chronic kidney disease) stage IV ([place] [address] ) 01/09/2019    Benign prostatic hyperplasia without lower urinary tract symptoms 01/09/2019    Lumbar pain 11/11/2018    Abnormal TSH 02/23/2018    Overweight (BMI 25 0-29 9) 02/23/2018    Aftercare following surgery of the musculoskeletal system 02/19/2018    Rotator cuff tear arthropathy, right 12/05/2017    Secondary osteoarthritis of right shoulder 12/05/2017    Benign essential hypertension 10/03/2012       Plan:    CT imaging performed prior to this visit demonstrates chronic sternal non-union  Surgical options: sternal plating, muscle fla or sternal plating + muscle flap discussed with patient   Patients age and renal status presents risk for general anesthesia, therefore we recommend continued conservative management and not surgical intervention  Oliver Sharpe was comfortable with our recommendations, and his questions were answered to his satisfaction  No future follow up recommended    Routine referral to gastroenterology for colonoscopy screening was not indicated, as the patient is over 78years old    SIGNATURE: EL Ramos  DATE: January 11, 2021  TIME: 8:54 AM  Attestation signed by Margaret Moreno DO at 1/11/2021  9:19 AM:  69 y/o male s/p ascending aortic replacement for type A dissection/intramural hematoma over a year ago now  Overall is doing remarkably well and despite his CKD has had recovery of renal function and no longer requires HD  His sternotomy dehisced after surgery and we have been monitoring it conservatively  CT scan shows most of the wires have pulled through and he does have a gap in the bone  There are no signs of infection  He complains of some discomfort when lying on his side, but otherwise is not bothered by it  I offered to take him to the OR to remove his wires, debride and re-wire/plate his sternum and involve plastics for flap closure  He does not want another operation at this time  I told him if he changes his mind, he should get back in contact with us to make arrangements  In terms of imaging his repair, at this point surveillance imaging would be ideal, but at his age and with his renal dysfunction, I think we can forgo contrast imaging to protect his kidneys  Return to clinic as needed

## 2021-01-14 NOTE — TELEPHONE ENCOUNTER
A third message has been left asking pt to contact the office  A letter will be mailed to pt home address

## 2021-01-17 PROBLEM — M06.9 RHEUMATOID ARTHRITIS (HCC): Status: ACTIVE | Noted: 2021-01-17

## 2021-01-17 PROBLEM — R05.8 COUGH WITH EXPOSURE TO COVID-19 VIRUS: Status: RESOLVED | Noted: 2020-07-08 | Resolved: 2021-01-17

## 2021-01-17 PROBLEM — Z47.89 AFTERCARE FOLLOWING SURGERY OF THE MUSCULOSKELETAL SYSTEM: Status: RESOLVED | Noted: 2018-02-19 | Resolved: 2021-01-17

## 2021-01-17 PROBLEM — Z20.822 COUGH WITH EXPOSURE TO COVID-19 VIRUS: Status: RESOLVED | Noted: 2020-07-08 | Resolved: 2021-01-17

## 2021-01-17 NOTE — ASSESSMENT & PLAN NOTE
Lab Results   Component Value Date    EGFR 19 01/05/2021    EGFR 21 11/11/2020    EGFR 23 11/02/2020    CREATININE 2 97 (H) 01/05/2021    CREATININE 2 78 (H) 11/11/2020    CREATININE 2 58 (H) 11/02/2020     Creatinine 2 97 with last check on January 5th  Following with Nephrology, Dr Yissel Mo  He was on hemodialysis post thoracic aorta repair  Last HD treatment 2/2020  He expresses he would do hemodialysis again if needed

## 2021-01-17 NOTE — ASSESSMENT & PLAN NOTE
Wt Readings from Last 3 Encounters:   01/11/21 78 9 kg (174 lb)   01/07/21 78 5 kg (173 lb 2 oz)   12/14/20 76 7 kg (169 lb)     On exam today, patient has +1 bilateral pitting lower extremity edema, bilateral crackles in bases, occasional cough  Granddaughter reports he gained 13 lb  She did give him Metaxalone  6 days ago with minimal improvement  Currently taking Bumex 4 mg twice daily  Weight gain of 4 lb in comparison to last weight in network to office visit today  Admits to noncompliance with low sodium diet  I have contacted his cardiologist's office regarding these findings, spoke to nurse Elizabeth Dia, who will follow through with patient   Review of records shows he has been administered IV furosemide in cardiology office in the past

## 2021-01-17 NOTE — ASSESSMENT & PLAN NOTE
Stable without medications  Follows with urology, Dr Lu Stephenson, at [facility]

## 2021-01-17 NOTE — ASSESSMENT & PLAN NOTE
Hand deformities consistent with rheumatoid arthritis  States he is not following with Rheumatology and does not desire to at the present time

## 2021-01-17 NOTE — ASSESSMENT & PLAN NOTE
Following with hematology oncology, Dr Elyn Kanner  Monoclonal gammopathy thought to be related to rheumatoid arthritis inflammation  It is recommended to repeat CBC every 6-12 months

## 2021-01-19 ENCOUNTER — OFFICE VISIT (OUTPATIENT)
Dept: CARDIOLOGY CLINIC | Facility: CLINIC | Age: 81
End: 2021-01-19
Payer: COMMERCIAL

## 2021-01-19 VITALS
OXYGEN SATURATION: 100 % | SYSTOLIC BLOOD PRESSURE: 102 MMHG | DIASTOLIC BLOOD PRESSURE: 66 MMHG | HEART RATE: 63 BPM | WEIGHT: 177 LBS | HEIGHT: 67 IN | BODY MASS INDEX: 27.78 KG/M2

## 2021-01-19 DIAGNOSIS — N18.9 ACUTE KIDNEY INJURY SUPERIMPOSED ON CHRONIC KIDNEY DISEASE (HCC): ICD-10-CM

## 2021-01-19 DIAGNOSIS — I71.01 DISSECTION OF THORACIC AORTA (HCC): ICD-10-CM

## 2021-01-19 DIAGNOSIS — N17.9 ACUTE KIDNEY INJURY SUPERIMPOSED ON CHRONIC KIDNEY DISEASE (HCC): ICD-10-CM

## 2021-01-19 DIAGNOSIS — Z98.890 S/P AORTA REPAIR: ICD-10-CM

## 2021-01-19 DIAGNOSIS — I50.30 DIASTOLIC CONGESTIVE HEART FAILURE, UNSPECIFIED HF CHRONICITY (HCC): Primary | ICD-10-CM

## 2021-01-19 DIAGNOSIS — N17.9 AKI (ACUTE KIDNEY INJURY) (HCC): ICD-10-CM

## 2021-01-19 PROCEDURE — 1160F RVW MEDS BY RX/DR IN RCRD: CPT | Performed by: NURSE PRACTITIONER

## 2021-01-19 PROCEDURE — 99214 OFFICE O/P EST MOD 30 MIN: CPT | Performed by: NURSE PRACTITIONER

## 2021-01-19 PROCEDURE — 4040F PNEUMOC VAC/ADMIN/RCVD: CPT | Performed by: NURSE PRACTITIONER

## 2021-01-19 PROCEDURE — 3078F DIAST BP <80 MM HG: CPT | Performed by: NURSE PRACTITIONER

## 2021-01-19 PROCEDURE — 1036F TOBACCO NON-USER: CPT | Performed by: NURSE PRACTITIONER

## 2021-01-19 PROCEDURE — 3074F SYST BP LT 130 MM HG: CPT | Performed by: NURSE PRACTITIONER

## 2021-01-19 RX ORDER — ATORVASTATIN CALCIUM 40 MG/1
40 TABLET, FILM COATED ORAL DAILY
Qty: 90 TABLET | Refills: 0 | Status: SHIPPED | OUTPATIENT
Start: 2021-01-19 | End: 2021-04-14

## 2021-01-19 NOTE — PATIENT INSTRUCTIONS
Weight yourself daily  If you gain 3 lbs in one day or 5 lbs in one week, please call the office at [phone] and ask for a nurse or the heart failure nurse  Keep your sodium intake to <2 grams, (2000 mg) per day, and fluids <2 Liters (2000 ml) per day  This is around 6-7, 8 oz glasses of fluid per day    Take your Metolazone three times this week  Monday, Wednesday and Friday  Take 30 minutes before you morning Bumex dose  ALWAYS take extra Potassium on these days     Repeat lab work next Tuesday

## 2021-01-19 NOTE — PROGRESS NOTES
Heart Failure Outpatient Progress Note - Oliver Sharpe 68 y o  male MRN: [id number]    @ Encounter: [id number]      Assessment/Plan:    Patient Active Problem List    Diagnosis Date Noted    Anemia 10/05/2020     Priority: Low    Acute on chronic diastolic (congestive) heart failure ([facility] ) 10/04/2020     Priority: Low    Acute renal failure superimposed on stage 4 chronic kidney disease ([facility] ) 10/04/2020     Priority: Low    GONZALES (dyspnea on exertion) 05/08/2020     Priority: Low    Paroxysmal atrial fibrillation ([place] [address] ) 04/17/2020     Priority: Low    Monoclonal gammopathy 03/11/2020     Priority: Low    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 02/24/2020     Priority: Low    Encounter for post surgical wound check 02/11/2020     Priority: Low    Sleep disorder 01/24/2020     Priority: Low    Hyperlipidemia 01/15/2020     Priority: Low    Complication of vascular dialysis catheter 01/05/2020     Priority: Low    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 12/31/2019     Priority: Low    Hyperphosphatemia 12/30/2019     Priority: Low    Hypotension 12/30/2019     Priority: Low    Anticoagulation goal of INR 2 to 3 12/20/2019     Priority: Low    Dissection of thoracic aorta ([facility] ) 12/15/2019     Priority: Low    S/P aorta repair 12/15/2019     Priority: Low    Bradycardia 04/23/2019     Priority: Low    SOBOE (shortness of breath on exertion) 04/23/2019     Priority: Low    Benign hypertension with CKD (chronic kidney disease) stage IV ([place] [address] ) 01/09/2019     Priority: Low    Benign prostatic hyperplasia without lower urinary tract symptoms 01/09/2019     Priority: Low    Lumbar pain 11/11/2018     Priority: Low    Abnormal TSH 02/23/2018     Priority: Low    Overweight (BMI 25 0-29 9) 02/23/2018     Priority: Low    Rotator cuff tear arthropathy, right 12/05/2017     Priority: Low    Secondary osteoarthritis of right shoulder 12/05/2017     Priority: Low    Benign essential hypertension 10/03/2012     Priority: Low    Rheumatoid arthritis ([facility] ) 01/17/2021    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 ([facility] ) 12/14/2020    Stage 4 chronic kidney disease ([place] [address] ) 11/04/2020    Chronic kidney disease-mineral and bone disorder 11/04/2020        Chronic HFpEF  -Diuretic: Bumex 4 mg BID with potassium 20 mEq BID, Metolazone 5 mg twice weekly   Antihypertensives- Metoprolol, Amlodipine  Currently enrolled in Pals program  Presents today, with weight gain and LE edema, though the edema is fairly mild  Has no abdominal bloating or distension  SOB unchanged  Difficult to assess JVD given severe TR  Will have him increase his Metolazone to 3x weekly until weight at baseline  Will recheck BMP and follow up in one week  Weight of 164 lbs-->176 lbs-->186 lbs  -->166 lbs, 164 lbs, today in office, 177 lbs  TTE 04/24/2019: LVEF 60%  LVIDd 4 31 cm  IVSd 1 4 cm  Grade 1 DD  Normal RV size and RVSF  Mild TR  PASP 35 mmHg  TTE 10/06/2020: LVEF 60%  LVIDd 4 69 cm  IVSd 1 41 cm  Grade 2 DD  Normal RV size and RVSF  KUNAL  Mild MR  Severe TR  Atrial fibrillation              [id number] = 4 (age, CHF, HTN)  Anticoagulation on Coumadin       Hypertension  Well controlled  Continue on amlodipine 5 mg daily and medications as above       Hyperlipidemia              Most recent lipid panel from 05/08/2020: cholesterol 108; TG 85; HDL 32; calculated LDL 59  Continue on atorvastatin 40 mg daily       Chronic kidney disease, stage IV Baseline creatinine of 2 0-2 3  Last BMP creat 2 9 Briefly on dialysis in February 2020  Follows with Dr Daya Lovett as outpatient  Recheck BMP 5-7 days       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 with hemiarch reconstruction and aortic valve resuspension with a 26 mm Dacron graft on 12/15/2019 by Dr Jarrett Perez                 Benign prostatic hyperplasia  History of COVID-19 infection: diagnosed in 07/2020       HPI:   Chang Diaz is a 72-year-old man with a PMH as above who presents to the office for hospital follow-up  Patient follows with Dr Daniela Arevalo and Rachel Means as outpatient  Being seen by HF team today for close hospital follow-up      Admitted to [facility] from 10/04 to 10/09/2020 after presenting with worsening LE and scrotal swelling and GONZALES for ~2 months  Was started on IV Bumex  Exacerbation felt to be in setting of high sodium diet prior to admission  Lost 30 lbs during this admission  Was transitioned back to home dose of PO Bumex on day of discharge        10/15/2020: Patient presents with his daughter  Feels okay today  Frustrated and confused why his weights have begun to increase since discharge  Has gained more than 10 lbs since discharge  Has been weighing himself almost daily  Taking Bumex as prescribed and reports good urinary response to this  Does have SOB with hills, stairs, and longer distances  Denies PND and orthopnea  Drinking about 50-60 oz fluid daily  Last night had rice and beans that were prepared with salt  Does routinely eat canned Spaghettios and canned ravioli and well and "Chinese noodle soup packets" multiple times a week  Discussed low sodium diet in depth as well as the importance of monitoring fluid intake  Provided with information for [employer] low sodium cookbook  Today, 10/30/20 patient presents for follow up  Was volume overloaded at last visit and instructed to increase Bumex to 3 mg BID  Was eating a lot of high sodium foods  Creat now >3  Was in ED a few days ago at the recommendation of his PCP for volume overload but refused to go  States today that he is "tired of spending all of his time in the hospital and never getting better"  Continues to complain of swelling in his legs and SOB  Still eating a good bit of salty food  Now up over 20 lbs since hospital discharge  Denies chest pain, palpitations, dizziness   Does find himself needing to raise the head of the bed for improved breathing  No PND       11/13/2020: Patient presents with his daughter for follow-up  Patient feels much better today (feels "like a baby")  He is very happy with his progress  No current complaints  Continues to have some LE swelling  Since HF PALS visiting nurses have been in home, it was inferred that patient was likely missing doses of diuretic  Family has now created a schedule where someone is home to help remind patient to take AM and afternoon medications  Additionally, patient's wife is cooking with significantly less salt  Has lost over 15 lbs in past few weeks  Last took metolazone sometime last week  Interval history: Dr Darwin Primrose 12/10/2020  Hasn't seen me since June, but since then had CHF exacerbations  He's seen our CHF NPs  Last hospitaliation was in Oct   Creatinine remains high 2s  Weight has now been stable (it had been up significantly)      His breathing is quite stable now  He is not using any wheelchair other assistive devices  He will be seeing the nephrologists Monday  Today, 1/19/21 patient presents for acute visit  On 1/8/21 patient's PCP called our office with concerns for volume overload and 8 lbs weight gain  Apparently had tried using Metolazone but with minimal response  Tells me today he does not feel any more SOB than usual  Has noticed some weight gain and edema  Denies any recent dietary indiscretions  Has had no orthopnea or PND  Past Medical History:   Diagnosis Date    Arthritis     BPH without urinary obstruction     CKD (chronic kidney disease), stage III     baseline 1 6-1 7    GERD (gastroesophageal reflux disease)     Hyperlipidemia     Hypertension     MI (myocardial infarction) ([facility] )        12 point ROS negative other than that stated in HPI    No Known Allergies        Current Outpatient Medications:     acetaminophen (TYLENOL) 325 mg tablet, Take 1-2 tabs q6h PRN mild fever/pain, Disp: 90 tablet, Rfl: 0    amLODIPine (NORVASC) 5 mg tablet, TAKE 1 TABLET (5 MG TOTAL) BY MOUTH DAILY, Disp: 30 tablet, Rfl: 3    atorvastatin (LIPITOR) 40 mg tablet, TAKE 1 TABLET (40 MG TOTAL) BY MOUTH DAILY, Disp: 90 tablet, Rfl: 0    bumetanide (BUMEX) 2 mg tablet, TAKE 2 TABLETS (4 MG TOTAL) BY MOUTH 2 (TWO) TIMES A DAY, Disp: 60 tablet, Rfl: 3    diphenhydrAMINE (BENADRYL) 25 mg tablet, Take 25 mg by mouth every 6 (six) hours as needed for itching, Disp: , Rfl:     metolazone (ZAROXOLYN) 5 mg tablet, TAKE 1 TABLET (5 MG TOTAL) BY MOUTH AS NEEDED (TAKE 1 TABLET 30 MINS BEFORE YOUR BUMEX FOR WEIGHT GAIN OF 3LBS IN ONE DAY OR 5LBS IN ONE WEEK   PLEASE TAKE AN EXTRA DOSE OF POTASSIUM WITH THIS ), Disp: 30 tablet, Rfl: 0    metoprolol tartrate (LOPRESSOR) 25 mg tablet, Take 25 mg by mouth every 12 (twelve) hours, Disp: , Rfl:     potassium chloride (K-DUR,KLOR-CON) 20 mEq tablet, Take 1 tablet (20 mEq total) by mouth 2 (two) times a day, Disp: 60 tablet, Rfl: 5    warfarin (COUMADIN) 2 mg tablet, Anticoagulation needed for afib, Disp: 30 tablet, Rfl: 0    Social History     Socioeconomic History    Marital status: /Civil Union     Spouse name: Not on file    Number of children: Not on file    Years of education: Not on file    Highest education level: Not on file   Occupational History    Not on file   Social Needs    Financial resource strain: Not hard at all   [employer] insecurity     Worry: Never true     Inability: Never true   [employer] needs     Medical: No     Non-medical: No   Tobacco Use    Smoking status: Former Smoker     Packs/day: 1 00     Quit date:      Years since quittin 0    Smokeless tobacco: Never Used   Substance and Sexual Activity    Alcohol use: Not Currently    Drug use: Not Currently    Sexual activity: Not Currently     Partners: Female   Lifestyle    Physical activity     Days per week: Not on file     Minutes per session: Not on file    Stress: Not on file   Relationships    Social connections     Talks on phone: Not on file     Gets together: Not on file     Attends Spiritism service: Not on file     Active member of club or organization: Not on file     Attends meetings of clubs or organizations: Not on file     Relationship status: Not on file    Intimate partner violence     Fear of current or ex partner: Not on file     Emotionally abused: Not on file     Physically abused: Not on file     Forced sexual activity: Not on file   Other Topics Concern    Not on file   Social History Narrative    Daily caffeine consumption, 1 serving a day    Drinks coffee        Family History   Problem Relation Age of Onset    No Known Problems Mother     Hypertension Father     Arthritis Family     No Known Problems Daughter        Physical Exam:    Vitals: /66 (BP Location: Right arm, Patient Position: Sitting, Cuff Size: Standard)   Pulse 63   Ht 5' 7" (1 702 m)   Wt 80 3 kg (177 lb)   SpO2 100%   BMI 27 72 kg/m²       Wt Readings from Last 3 Encounters:   01/11/21 78 9 kg (174 lb)   01/07/21 78 5 kg (173 lb 2 oz)   12/14/20 76 7 kg (169 lb)       GEN: Oliver Sharpe appears well, alert and oriented x 3, pleasant and cooperative   HEENT: pupils equal, round, and reactive to light; extraocular muscles intact  NECK: supple, no carotid bruits   HEART: regular rhythm, normal S1 and S2, no murmurs, clicks, gallops or rubs, JVP is up but with severe TR  Jasmine Jasmyn   LUNGS: diminished to auscultation bilaterally; no wheezes +few faint crackles at bilateral bases, no  rhonchi   ABDOMEN: normal bowel sounds, soft, no tenderness, mild distention  EXTREMITIES: peripheral pulses normal; no clubbing, cyanosis, +1 BLLE edema  NEURO: no focal findings   SKIN: normal without suspicious lesions on exposed skin    Labs & Results:    Chemistry        Component Value Date/Time    K 4 6 01/05/2021 1018     (H) 01/05/2021 1018    CO2 31 01/05/2021 1018    CO2 24 12/15/2019 1619    BUN 78 (H) 01/05/2021 1018    CREATININE 2 97 (H) 01/05/2021 1018        Component Value Date/Time    CALCIUM 9 2 01/05/2021 1018    ALKPHOS 140 (H) 10/04/2020 1450    AST 57 (H) 10/04/2020 1450    ALT 23 10/04/2020 1450        Lab Results   Component Value Date    NTBNP 2,792 (H) 11/02/2020      Lab Results   Component Value Date    WBC 5 50 01/05/2021    HGB 9 2 (L) 01/05/2021    HCT 30 1 (L) 01/05/2021    MCV 97 01/05/2021     (L) 01/05/2021     Lab Results   Component Value Date    RTQ1KIJMWBCM 4 820 (H) 01/02/2019     Lab Results   Component Value Date    LDLCALC 59 05/08/2020       EKG personally reviewed by EL Mancia  No results found for this visit on 01/19/21  Counseling / Coordination of Care  Total floor / unit time spent today 20 minutes  Greater than 50% of total time was spent with the patient and / or family counseling and / or coordination of care  A description of the counseling / coordination of care: 20  Thank you for the opportunity to participate in the care of this patient      Gwen Eubanks

## 2021-01-22 DIAGNOSIS — I50.20 SYSTOLIC CONGESTIVE HEART FAILURE, UNSPECIFIED HF CHRONICITY (HCC): ICD-10-CM

## 2021-01-22 RX ORDER — METOLAZONE 5 MG/1
5 TABLET ORAL AS NEEDED
Qty: 30 TABLET | Refills: 0 | Status: SHIPPED | OUTPATIENT
Start: 2021-01-22 | End: 2021-02-17 | Stop reason: HOSPADM

## 2021-01-26 ENCOUNTER — TELEPHONE (OUTPATIENT)
Dept: CARDIOLOGY CLINIC | Facility: CLINIC | Age: 81
End: 2021-01-26

## 2021-01-26 ENCOUNTER — APPOINTMENT (EMERGENCY)
Dept: RADIOLOGY | Facility: HOSPITAL | Age: 81
DRG: 981 | End: 2021-01-26
Payer: COMMERCIAL

## 2021-01-26 ENCOUNTER — ANTICOAG VISIT (OUTPATIENT)
Dept: CARDIOLOGY CLINIC | Facility: CLINIC | Age: 81
End: 2021-01-26

## 2021-01-26 ENCOUNTER — HOSPITAL ENCOUNTER (INPATIENT)
Facility: HOSPITAL | Age: 81
LOS: 22 days | Discharge: HOME | DRG: 981 | End: 2021-02-17
Attending: EMERGENCY MEDICINE | Admitting: GENERAL PRACTICE
Payer: COMMERCIAL

## 2021-01-26 ENCOUNTER — LAB (OUTPATIENT)
Dept: LAB | Facility: HOSPITAL | Age: 81
DRG: 981 | End: 2021-01-26
Attending: INTERNAL MEDICINE
Payer: COMMERCIAL

## 2021-01-26 DIAGNOSIS — R60.0 LOWER EXTREMITY EDEMA: ICD-10-CM

## 2021-01-26 DIAGNOSIS — N18.6 ESRD (END STAGE RENAL DISEASE) (HCC): ICD-10-CM

## 2021-01-26 DIAGNOSIS — N18.9 CHRONIC KIDNEY DISEASE-MINERAL AND BONE DISORDER: ICD-10-CM

## 2021-01-26 DIAGNOSIS — I50.31 ACUTE DIASTOLIC CHF (CONGESTIVE HEART FAILURE) (HCC): ICD-10-CM

## 2021-01-26 DIAGNOSIS — E83.9 CHRONIC KIDNEY DISEASE-MINERAL AND BONE DISORDER: ICD-10-CM

## 2021-01-26 DIAGNOSIS — I50.33 ACUTE ON CHRONIC DIASTOLIC (CONGESTIVE) HEART FAILURE (HCC): ICD-10-CM

## 2021-01-26 DIAGNOSIS — M89.9 CHRONIC KIDNEY DISEASE-MINERAL AND BONE DISORDER: ICD-10-CM

## 2021-01-26 DIAGNOSIS — I50.30 DIASTOLIC CONGESTIVE HEART FAILURE, UNSPECIFIED HF CHRONICITY (HCC): Primary | ICD-10-CM

## 2021-01-26 DIAGNOSIS — R79.89 ABNORMAL TSH: ICD-10-CM

## 2021-01-26 DIAGNOSIS — N17.9 AKI (ACUTE KIDNEY INJURY) (HCC): ICD-10-CM

## 2021-01-26 DIAGNOSIS — R06.00 DYSPNEA: ICD-10-CM

## 2021-01-26 DIAGNOSIS — E78.5 HYPERLIPIDEMIA, UNSPECIFIED HYPERLIPIDEMIA TYPE: ICD-10-CM

## 2021-01-26 DIAGNOSIS — Z51.81 ANTICOAGULATION GOAL OF INR 2 TO 3: ICD-10-CM

## 2021-01-26 DIAGNOSIS — Z79.01 ANTICOAGULATION GOAL OF INR 2 TO 3: ICD-10-CM

## 2021-01-26 DIAGNOSIS — N18.4 STAGE 4 CHRONIC KIDNEY DISEASE (HCC): Primary | ICD-10-CM

## 2021-01-26 PROBLEM — I48.91 ATRIAL FIBRILLATION (HCC): Status: ACTIVE | Noted: 2021-01-26

## 2021-01-26 LAB
ANION GAP SERPL CALCULATED.3IONS-SCNC: 5 MMOL/L (ref 4–13)
ATRIAL RATE: 62 BPM
BASOPHILS # BLD AUTO: 0.01 THOUSANDS/ΜL (ref 0–0.1)
BASOPHILS NFR BLD AUTO: 0 % (ref 0–1)
BUN SERPL-MCNC: 112 MG/DL (ref 5–25)
CALCIUM SERPL-MCNC: 9.2 MG/DL (ref 8.3–10.1)
CHLORIDE SERPL-SCNC: 106 MMOL/L (ref 100–108)
CHOLEST SERPL-MCNC: 88 MG/DL (ref 50–200)
CO2 SERPL-SCNC: 31 MMOL/L (ref 21–32)
CREAT SERPL-MCNC: 4.19 MG/DL (ref 0.6–1.3)
EOSINOPHIL # BLD AUTO: 0.15 THOUSAND/ΜL (ref 0–0.61)
EOSINOPHIL NFR BLD AUTO: 3 % (ref 0–6)
ERYTHROCYTE [DISTWIDTH] IN BLOOD BY AUTOMATED COUNT: 15.8 % (ref 11.6–15.1)
GFR SERPL CREATININE-BSD FRML MDRD: 13 ML/MIN/1.73SQ M
GLUCOSE P FAST SERPL-MCNC: 102 MG/DL (ref 65–99)
HCT VFR BLD AUTO: 28.7 % (ref 36.5–49.3)
HDLC SERPL-MCNC: 42 MG/DL
HGB BLD-MCNC: 9 G/DL (ref 12–17)
IMM GRANULOCYTES # BLD AUTO: 0.02 THOUSAND/UL (ref 0–0.2)
IMM GRANULOCYTES NFR BLD AUTO: 0 % (ref 0–2)
LDLC SERPL CALC-MCNC: 32 MG/DL (ref 0–100)
LYMPHOCYTES # BLD AUTO: 1.02 THOUSANDS/ΜL (ref 0.6–4.47)
LYMPHOCYTES NFR BLD AUTO: 17 % (ref 14–44)
MCH RBC QN AUTO: 30.3 PG (ref 26.8–34.3)
MCHC RBC AUTO-ENTMCNC: 31.4 G/DL (ref 31.4–37.4)
MCV RBC AUTO: 97 FL (ref 82–98)
MONOCYTES # BLD AUTO: 1.23 THOUSAND/ΜL (ref 0.17–1.22)
MONOCYTES NFR BLD AUTO: 20 % (ref 4–12)
NEUTROPHILS # BLD AUTO: 3.61 THOUSANDS/ΜL (ref 1.85–7.62)
NEUTS SEG NFR BLD AUTO: 60 % (ref 43–75)
NONHDLC SERPL-MCNC: 46 MG/DL
NRBC BLD AUTO-RTO: 0 /100 WBCS
NT-PROBNP SERPL-MCNC: 4828 PG/ML
PLATELET # BLD AUTO: 113 THOUSANDS/UL (ref 149–390)
PMV BLD AUTO: 11.2 FL (ref 8.9–12.7)
POTASSIUM SERPL-SCNC: 4 MMOL/L (ref 3.5–5.3)
QRS AXIS: 76 DEGREES
QRSD INTERVAL: 76 MS
QT INTERVAL: 446 MS
QTC INTERVAL: 414 MS
RBC # BLD AUTO: 2.97 MILLION/UL (ref 3.88–5.62)
SODIUM SERPL-SCNC: 142 MMOL/L (ref 136–145)
T WAVE AXIS: 193 DEGREES
TRIGL SERPL-MCNC: 69 MG/DL
TROPONIN I SERPL-MCNC: <0.02 NG/ML
TSH SERPL DL<=0.05 MIU/L-ACNC: 3.61 UIU/ML (ref 0.36–3.74)
VENTRICULAR RATE: 52 BPM
WBC # BLD AUTO: 6.04 THOUSAND/UL (ref 4.31–10.16)

## 2021-01-26 PROCEDURE — 99285 EMERGENCY DEPT VISIT HI MDM: CPT | Performed by: EMERGENCY MEDICINE

## 2021-01-26 PROCEDURE — 84443 ASSAY THYROID STIM HORMONE: CPT

## 2021-01-26 PROCEDURE — 93010 ELECTROCARDIOGRAM REPORT: CPT | Performed by: INTERNAL MEDICINE

## 2021-01-26 PROCEDURE — 80061 LIPID PANEL: CPT

## 2021-01-26 PROCEDURE — 80048 BASIC METABOLIC PNL TOTAL CA: CPT | Performed by: NURSE PRACTITIONER

## 2021-01-26 PROCEDURE — 83880 ASSAY OF NATRIURETIC PEPTIDE: CPT | Performed by: EMERGENCY MEDICINE

## 2021-01-26 PROCEDURE — 84484 ASSAY OF TROPONIN QUANT: CPT | Performed by: EMERGENCY MEDICINE

## 2021-01-26 PROCEDURE — 93005 ELECTROCARDIOGRAM TRACING: CPT

## 2021-01-26 PROCEDURE — 99285 EMERGENCY DEPT VISIT HI MDM: CPT

## 2021-01-26 PROCEDURE — 85025 COMPLETE CBC W/AUTO DIFF WBC: CPT | Performed by: EMERGENCY MEDICINE

## 2021-01-26 PROCEDURE — 99223 1ST HOSP IP/OBS HIGH 75: CPT | Performed by: GENERAL PRACTICE

## 2021-01-26 PROCEDURE — 71045 X-RAY EXAM CHEST 1 VIEW: CPT

## 2021-01-26 RX ORDER — SODIUM CHLORIDE 9 MG/ML
3 INJECTION INTRAVENOUS
Status: DISCONTINUED | OUTPATIENT
Start: 2021-01-26 | End: 2021-02-17 | Stop reason: HOSPADM

## 2021-01-26 RX ORDER — ONDANSETRON 2 MG/ML
4 INJECTION INTRAMUSCULAR; INTRAVENOUS EVERY 6 HOURS PRN
Status: DISCONTINUED | OUTPATIENT
Start: 2021-01-26 | End: 2021-02-17 | Stop reason: HOSPADM

## 2021-01-26 RX ORDER — ACETAMINOPHEN 325 MG/1
650 TABLET ORAL EVERY 6 HOURS PRN
Status: DISCONTINUED | OUTPATIENT
Start: 2021-01-26 | End: 2021-02-17 | Stop reason: HOSPADM

## 2021-01-26 RX ORDER — BUMETANIDE 0.25 MG/ML
2 INJECTION, SOLUTION INTRAMUSCULAR; INTRAVENOUS ONCE
Status: COMPLETED | OUTPATIENT
Start: 2021-01-26 | End: 2021-01-26

## 2021-01-26 RX ORDER — ATORVASTATIN CALCIUM 40 MG/1
40 TABLET, FILM COATED ORAL DAILY
Status: DISCONTINUED | OUTPATIENT
Start: 2021-01-27 | End: 2021-02-17 | Stop reason: HOSPADM

## 2021-01-26 RX ORDER — AMLODIPINE BESYLATE 5 MG/1
5 TABLET ORAL DAILY
Status: CANCELLED | OUTPATIENT
Start: 2021-01-27

## 2021-01-26 RX ADMIN — BUMETANIDE 2 MG: 0.25 INJECTION INTRAMUSCULAR; INTRAVENOUS at 17:39

## 2021-01-26 NOTE — ASSESSMENT & PLAN NOTE
· Blood pressure on soft side  · Hold norvasc  Continue Lopressor with hold parameters     · Monitor

## 2021-01-26 NOTE — ED PROVIDER NOTES
History  Chief Complaint   Patient presents with    Shortness of Breath     SOB x2 days  67 yo male with hx grade 2 diastolic CHF, CKD stage III, HTN, HLD, CAD, type A aortic dissection in 2019, here with SOB over the past 2 days  Per chart review, patient had labs done today that revaled CRT of 4 19 from 3 98 previously  He spoke with someone from his cardiology office over the phone, who advised he go to the emergency department for further evaluation  On ED encounter, patient says he has had SOB x 1 week  A home health nurse was examining patient today and on lung auscultation told him he "had fluid in his lungs"  SOB is worse on exertion and also complains of generalized weakness and fatigue  Reports chronic bilateral lower extremity swelling that is somewhat worse than baseline  Denies fever, chills, cough, chest pain, n/v/d, abdominal pain, headache, focal neuro sx, any other complaints  Prior to Admission Medications   Prescriptions Last Dose Informant Patient Reported? Taking?   acetaminophen (TYLENOL) 325 mg tablet  Family Member No No   Sig: Take 1-2 tabs q6h PRN mild fever/pain   amLODIPine (NORVASC) 5 mg tablet  Family Member No No   Sig: TAKE 1 TABLET (5 MG TOTAL) BY MOUTH DAILY   atorvastatin (LIPITOR) 40 mg tablet  Family Member No No   Sig: TAKE 1 TABLET (40 MG TOTAL) BY MOUTH DAILY   bumetanide (BUMEX) 2 mg tablet  Family Member No No   Sig: TAKE 2 TABLETS (4 MG TOTAL) BY MOUTH 2 (TWO) TIMES A DAY   diphenhydrAMINE (BENADRYL) 25 mg tablet  Family Member Yes No   Sig: Take 25 mg by mouth every 6 (six) hours as needed for itching   metolazone (ZAROXOLYN) 5 mg tablet   No No   Sig: TAKE 1 TABLET (5 MG TOTAL) BY MOUTH AS NEEDED (TAKE 1 TABLET 30 MINS BEFORE YOUR BUMEX FOR WEIGHT GAIN OF 3LBS IN ONE DAY OR 5LBS IN ONE WEEK   PLEASE TAKE AN EXTRA DOSE OF POTASSIUM WITH THIS )   metoprolol tartrate (LOPRESSOR) 25 mg tablet  Family Member Yes No   Sig: Take 25 mg by mouth every 12 (twelve) hours   potassium chloride (K-DUR,KLOR-CON) 20 mEq tablet  Family Member No No   Sig: Take 1 tablet (20 mEq total) by mouth 2 (two) times a day   warfarin (COUMADIN) 2 mg tablet  Family Member No No   Sig: Anticoagulation needed for afib      Facility-Administered Medications: None       Past Medical History:   Diagnosis Date    Arthritis     BPH without urinary obstruction     CKD (chronic kidney disease), stage III     baseline 1 6-1 7    GERD (gastroesophageal reflux disease)     Hyperlipidemia     Hypertension     MI [facility]myocardial infarction) ([facility] [address] )        Past Surgical History:   Procedure Laterality Date    APPENDECTOMY      CARDIAC SURGERY      COLONOSCOPY  2017    FRACTURE SURGERY      IR TEMPORARY DIALYSIS CATHETER PLACEMENT  12/23/2019    IR THORACENTESIS  12/24/2019    IR THORACENTESIS  12/27/2019    IR TUNNELED DIALYSIS CATHETER CHECK/CHANGE/REPOSITION/ANGIOPLASTY  1/6/2020    IR TUNNELED DIALYSIS CATHETER PLACEMENT  1/2/2020    IR TUNNELED DIALYSIS CATHETER REMOVAL  3/4/2020    KS ASCEND AORTA GRAFT W ROOT REPLACMENT  VALVE CONDUIT/CORON RECONSTRUCT N/A 12/15/2019    Procedure: BENTALL PROCEDURE (ASCENDING AORTIC REPAIR) with 26mm Gelweave Graft;  Surgeon: Naveen Olivera DO;  [facility]: [facility];  Service: Cardiac Surgery    KS RECONSTR TOTAL SHOULDER IMPLANT Right 12/5/2017    Procedure: ARTHROPLASTY SHOULDER REVERSE with OPEN CYST EXCISION;  Surgeon: Pauline Smith MD;  Location: [facility];  Service: Orthopedics    SHOULDER SURGERY      WRIST SURGERY         Family History   Problem Relation Age of Onset    No Known Problems Mother     Hypertension Father     Arthritis Family     No Known Problems Daughter      I have reviewed and agree with the history as documented      E-Cigarette/Vaping    E-Cigarette Use Former User      E-Cigarette/Vaping Substances    Nicotine No     THC No     CBD No     Flavoring No     Other No     Unknown No      Social History     Tobacco Use  Smoking status: Former Smoker     Packs/day: 1 00     Quit date:      Years since quittin 0    Smokeless tobacco: Never Used   Substance Use Topics    Alcohol use: Not Currently    Drug use: Not Currently        Review of Systems   Constitutional: Negative  Negative for chills and fever  HENT: Negative  Negative for congestion and rhinorrhea  Eyes: Negative  Respiratory: Positive for shortness of breath  Negative for cough  Cardiovascular: Negative  Negative for chest pain and leg swelling  Gastrointestinal: Negative  Negative for abdominal distention, abdominal pain, diarrhea, nausea and vomiting  Musculoskeletal: Negative  Negative for back pain and neck pain  Skin: Negative  Negative for rash  Neurological: Negative  Negative for light-headedness and headaches  Hematological: Negative  All other systems reviewed and are negative  Physical Exam  ED Triage Vitals [21 1330]   Temperature Pulse Respirations Blood Pressure SpO2   (!) 97 3 °F (36 3 °C) 75 20 109/60 97 %      Temp src Heart Rate Source Patient Position - Orthostatic VS BP Location FiO2 (%)   -- Monitor Sitting Left arm --      Pain Score       --             Orthostatic Vital Signs  Vitals:    21 1330 21 1515   BP: 109/60 94/53   Pulse: 75 (!) 48   Patient Position - Orthostatic VS: Sitting Sitting       Physical Exam  Vitals signs and nursing note reviewed  Constitutional:       General: He is not in acute distress  Appearance: He is well-developed  HENT:      Head: Normocephalic and atraumatic  Mouth/Throat:      Mouth: Mucous membranes are moist    Eyes:      Pupils: Pupils are equal, round, and reactive to light  Neck:      Musculoskeletal: Normal range of motion and neck supple  Cardiovascular:      Rate and Rhythm: Regular rhythm  Bradycardia present  Heart sounds: Normal heart sounds  No murmur  No friction rub  No gallop      Pulmonary:      Effort: Pulmonary effort is normal  No tachypnea, bradypnea or respiratory distress  Breath sounds: No stridor  Rales (fine rales in bases) present  No decreased breath sounds, wheezing or rhonchi  Abdominal:      Palpations: Abdomen is soft  Tenderness: There is no abdominal tenderness  There is no guarding or rebound  Musculoskeletal: Normal range of motion  General: No swelling or tenderness  Right lower leg: He exhibits no tenderness  Edema present  Left lower leg: He exhibits no tenderness  Edema present  Comments: No calf tenderness   Skin:     General: Skin is warm and dry  Capillary Refill: Capillary refill takes less than 2 seconds  Neurological:      Mental Status: He is alert and oriented to person, place, and time        Comments: Clear fluent speech         ED Medications  Medications   sodium chloride (PF) 0 9 % injection 3 mL (has no administration in time range)       Diagnostic Studies  Results Reviewed     Procedure Component Value Units Date/Time    Troponin I [[id number]]  (Normal) Collected: 01/26/21 1418    Lab Status: Final result Specimen: Blood from Arm, Left Updated: 01/26/21 1501     Troponin I <0 02 ng/mL     NT-BNP PRO [[id number]]  (Abnormal) Collected: 01/26/21 1418    Lab Status: Final result Specimen: Blood from Arm, Left Updated: 01/26/21 1455     NT-proBNP 4,828 pg/mL     CBC and differential [[id number]]  (Abnormal) Collected: 01/26/21 1418    Lab Status: Final result Specimen: Blood from Arm, Left Updated: 01/26/21 1438     WBC 6 04 Thousand/uL      RBC 2 97 Million/uL      Hemoglobin 9 0 g/dL      Hematocrit 28 7 %      MCV 97 fL      MCH 30 3 pg      MCHC 31 4 g/dL      RDW 15 8 %      MPV 11 2 fL      Platelets 020 Thousands/uL      nRBC 0 /100 WBCs      Neutrophils Relative 60 %      Immat GRANS % 0 %      Lymphocytes Relative 17 %      Monocytes Relative 20 %      Eosinophils Relative 3 %      Basophils Relative 0 %      Neutrophils Absolute 3 61 Thousands/µL      Immature Grans Absolute 0 02 Thousand/uL      Lymphocytes Absolute 1 02 Thousands/µL      Monocytes Absolute 1 23 Thousand/µL      Eosinophils Absolute 0 15 Thousand/µL      Basophils Absolute 0 01 Thousands/µL                  X-ray chest 1 view portable   Final Result by Sage Blancas MD (01/26 1526)      Minimal left basilar subsegmental atelectasis versus scarring with otherwise clear lungs  Workstation performed: [id number]               Procedures  Procedures      ED Course  ED Course as of Jan 26 1629   Tue Jan 26, 2021   1342 Procedure Note: EKG  Date/Time: 01/26/21 1:43 PM   Interpreted by: Brittany Clay  Indications / Diagnosis: SOB  ECG reviewed by me, the ED Provider: yes   The EKG demonstrates:  Rate: 52  Rhythm: sinus gwen  Intervals: normal intervals  Axis: normal axis  QRS/Blocks: normal QRS  ST Changes: No acute ST Changes, no STD/CHRISTOPHER                                           MDM  Number of Diagnoses or Management Options  AFSHIN (acute kidney injury) [facility]):   Dyspnea:   Lower extremity edema:   Diagnosis management comments: 69 yo male with hx grade 2 diastolic CHF, CKD stage III, HTN, HLD, CAD, type A aortic dissection in 2019, on warfarin, here with SOB over the past 2 days  Within ddx consider CHF exacerbation, CAD, volume overload secondary to AFSHIN, pneumonia, COVID-19, pneumothorax  Will obtain cardiac workup  Final assessment: Workup reveals elevated CRT, elevated BNP in 4000s, otherwise appears to be at baseline  Discussed with admitting physician who agrees to accept patient for further management  Patient remains stable throughout ED course         Disposition  Final diagnoses:   Dyspnea   AFSHIN (acute kidney injury) ([facility] [address] )   Lower extremity edema     Time reflects when diagnosis was documented in both MDM as applicable and the Disposition within this note     Time User Action Codes Description Comment    1/26/2021  3:38 PM Brittany Clay Add [R06 00] Dyspnea     1/26/2021  3:38 PM Minor, Brittany Add [N17 9] AFSHIN (acute kidney injury) ([facility] [address] )     1/26/2021  3:38 PM Minor, Brittany Add [R60 0] Lower extremity edema     1/26/2021  4:11 PM SHABANA [employer] M Add [A22 50] Acute diastolic CHF (congestive heart failure) [facility])       ED Disposition     ED Disposition Condition Date/Time Comment    Admit Stable Tue Jan 26, 2021  3:38 PM Case was discussed with SKYLAR and the patient's admission status was agreed to be Admission Status: inpatient status to the service of [facility]   Follow-up Information    None         Patient's Medications   Discharge Prescriptions    No medications on file     No discharge procedures on file  PDMP Review       Value Time User    PDMP Reviewed  Yes 1/7/2020 10:50 AM Mark Santiago PA-C           ED Provider  Attending physically available and evaluated [facility]  I managed the patient along with the ED Attending      Electronically Signed by         Krish Curiel MD  01/26/21 6981

## 2021-01-26 NOTE — ASSESSMENT & PLAN NOTE
Wt Readings from Last 3 Encounters:   01/26/21 79 4 kg (175 lb)   01/19/21 80 3 kg (177 lb)   01/11/21 78 9 kg (174 lb)     · Presents with 15 lb weight gain in one month, GONZALES, worsening lower extremity edema  Outpatient cardiology team ordered extra dose of diuretic however no improvement  Patient was noted to have AFSHIN on outpatient labs and referred to ED  · Outpatient diuretic regimen bumex 4 mg PO BID with k 20 BID and metolazone 5 2 x weekly  · Will give one time dose IV Bumex now and monitor response  Consult heart failure  Defer additional diuretics to heart failure team    · Last echo 10/2020 showed preserved EF  Mild MR   Severe TR    · I/O, daily weights, low NA diet with FR  · Monitor volume status

## 2021-01-26 NOTE — ASSESSMENT & PLAN NOTE
· Rate controlled on Lopressor  · Anticoagulated on Coumadin  INR supratherapeutic at 3 39, will hold Coumadin for now  Repeat INR in a m  Shannon Lackey

## 2021-01-26 NOTE — ED ATTENDING ATTESTATION
1/26/2021  IToshia MD, saw and evaluated the patient  I have discussed the patient with the resident/non-physician practitioner and agree with the resident's/non-physician practitioner's findings, Plan of Care, and MDM as documented in the resident's/non-physician practitioner's note, except where noted  All available labs and Radiology studies were reviewed  I was present for key portions of any procedure(s) performed by the resident/non-physician practitioner and I was immediately available to provide assistance  At this point I agree with the current assessment done in the Emergency Department  I have conducted an independent evaluation of this patient a history and physical is as follows:    OA: 67 y/o m with h/o aortic dissection s/p repair, CHF and AFSHIN who presents with SOB  Home health aid became more concerned today while on exam she noted sounds concerning for fluid overload  PT notes that at rest he feels well but does admit to GONZALES  + lower extremity edema  Compliant with medications  No fevers/chills  No cp/back pain  Notes that this does not feel like sxms he experience when he his dissection  Tolerating PO  PE, NAD, VSS  Although intermittently becomes bradycardic to the 50s with stable BP, Nc/AT, MMM, neck supple/FROM, RR, lungs with decreased BS at bases, scattered rales, no wheezing, abd soft, +Bs, -r/g, + 1 LE edema, - calf ttp, + pulses, capillary refill < 2 sec, AAO   A/p SOB, history of CHF, eval with cardiac labs, EKG, CXR, monitor, treat accordingly         ED Course         Critical Care Time  Procedures

## 2021-01-26 NOTE — H&P
[address] Internal [facility]    H&P- [facility] 1940, 68 y o  male MRN: [id number]    Unit/Bed#: ED 21 Encounter: [id number]    Primary Care Provider: EL Neff   Date and time admitted to hospital: 1/26/2021  1:24 PM        * Acute on chronic diastolic (congestive) heart failure ([facility] [address] )  Assessment & Plan  Wt Readings from Last 3 Encounters:   01/26/21 79 4 kg (175 lb)   01/19/21 80 3 kg (177 lb)   01/11/21 78 9 kg (174 lb)     · Presents with 15 lb weight gain in one month, GONZALES, worsening lower extremity edema  Outpatient cardiology team ordered extra dose of diuretic however no improvement  Patient was noted to have AFSHIN on outpatient labs and referred to ED  · Outpatient diuretic regimen bumex 4 mg PO BID with k 20 BID and metolazone 5 2 x weekly  · Will give one time dose IV Bumex now and monitor response  Consult heart failure  Defer additional diuretics to heart failure team    · Last echo 10/2020 showed preserved EF  Mild MR  Severe TR    · I/O, daily weights, low NA diet with FR  · Monitor volume status        Atrial fibrillation ([facility] )  Assessment & Plan  · Rate controlled on Lopressor  · Anticoagulated on Coumadin  INR supratherapeutic at 3 39, will hold Coumadin for now  Repeat INR in a m     Stage 4 chronic kidney disease [facility])  Assessment & Plan  Lab Results   Component Value Date    EGFR 13 01/26/2021    EGFR 19 01/05/2021    EGFR 21 11/11/2020    CREATININE 4 19 (H) 01/26/2021    CREATININE 2 97 (H) 01/05/2021    CREATININE 2 78 (H) 11/11/2020   · Baseline creatinine 2-2 9 approximately  4 19 earlier today on outpatient labs  Likely secondary to volume overload  · Hold norvasc 2/2 soft BP  · Avoid nephrotoxins and hypotension  · Low threshold for nephrology consult  · BMP in AM  · Known to Dr Levon Sage as outpatient    Benign essential hypertension  Assessment & Plan  · Blood pressure on soft side  · Hold norvasc  Continue Lopressor with hold parameters     · 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 [facility])  Assessment & Plan  · 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  · 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  Risk associated with surgical options therefore recommended continued observation  Hyperlipidemia  Assessment & Plan  · Continue statin    Anemia  Assessment & Plan  · Chronic, stable at baseline  VTE Prophylaxis: Pharmacologic VTE Prophylaxis contraindicated due to Supratherapeutic INR  / sequential compression device   Code Status:  Full code  POLST: POLST form is not discussed and not completed at this time  Anticipated Length of Stay:  Patient will be admitted on an Inpatient basis with an anticipated length of stay of  greater than 2 midnights  Justification for Hospital Stay:  Heart failure, need for intravenous diuretics, close lab monitoring    Total Time for Visit, including Counseling / Coordination of Care: 45 minutes  Greater than 50% of this total time spent on direct patient counseling and coordination of care  Chief Complaint:   Dyspnea on exertion, worsening lower extremity edema, weight gain  "I am having trouble breathing"    History of Present Illness:    Mai Singh is a 68 y o  male with past medical history of diastolic heart failure, atrial fibrillation on Coumadin, stage 4 kidney disease , aortic dissection status post emergent replacement of ascending aorta with hemiarch reconstructive surgery with chronic sternal nonunion, hyperlipidemia, chronic anemia who presents with dyspnea on exertion, weight gain of 15 lb in 1 month and lower extremity edema  Patient follows with both Nephrology and Cardiology as an outpatient  Cardiology had been attempting to give additional p o  Diuretics however no relief in symptoms    Had outpatient labs today which showed a worsening creatinine of 4 19 was referred to the ED for further evaluation  Patient reports medication and dietary compliance  Plan d/w attending Dr Winkler Degree  Review of Systems:    Review of Systems   Constitutional: Positive for fatigue  Negative for chills, diaphoresis and fever  HENT: Negative for congestion  Respiratory: Positive for shortness of breath  Negative for chest tightness  Cardiovascular: Positive for leg swelling  Negative for chest pain  Gastrointestinal: Negative for abdominal pain, diarrhea, nausea and vomiting  Genitourinary: Negative for difficulty urinating  Skin: Negative for wound  Neurological: Negative for dizziness and light-headedness  All other systems reviewed and are negative  Past Medical and Surgical History:     Past Medical History:   Diagnosis Date    Arthritis     BPH without urinary obstruction     CKD (chronic kidney disease), stage III     baseline 1 6-1 7    GERD (gastroesophageal reflux disease)     Hyperlipidemia     Hypertension     MI (myocardial infarction) ([facility] [address] )        Past Surgical History:   Procedure Laterality Date    APPENDECTOMY      CARDIAC SURGERY      COLONOSCOPY  2017    FRACTURE SURGERY      IR TEMPORARY DIALYSIS CATHETER PLACEMENT  12/23/2019    IR THORACENTESIS  12/24/2019    IR THORACENTESIS  12/27/2019    IR TUNNELED DIALYSIS CATHETER CHECK/CHANGE/REPOSITION/ANGIOPLASTY  1/6/2020    IR TUNNELED DIALYSIS CATHETER PLACEMENT  1/2/2020    IR TUNNELED DIALYSIS CATHETER REMOVAL  3/4/2020    WI ASCEND AORTA GRAFT W ROOT REPLACMENT  VALVE CONDUIT/CORON RECONSTRUCT N/A 12/15/2019    Procedure: BENTALL PROCEDURE (ASCENDING AORTIC REPAIR) with 26mm Gelweave Graft;  Surgeon: Carmen Martinez DO;  Location: [facility];  Service: Cardiac Surgery    WI RECONSTR TOTAL SHOULDER IMPLANT Right 12/5/2017    Procedure: ARTHROPLASTY SHOULDER REVERSE with OPEN CYST EXCISION;  Surgeon: Sarah Sanchez MD;  Location: [facility];  Service: Orthopedics    SHOULDER SURGERY      WRIST SURGERY Meds/Allergies:    Prior to Admission medications    Medication Sig Start Date End Date Taking? Authorizing Provider   acetaminophen (TYLENOL) 325 mg tablet Take 1-2 tabs q6h PRN mild fever/pain 1/7/20   Vernadine TRINY Israel   amLODIPine (NORVASC) 5 mg tablet TAKE 1 TABLET (5 MG TOTAL) BY MOUTH DAILY 12/29/20   EL Ayala   atorvastatin (LIPITOR) 40 mg tablet TAKE 1 TABLET (40 MG TOTAL) BY MOUTH DAILY 1/19/21   Jaylen Bella MD   bumetanide (BUMEX) 2 mg tablet TAKE 2 TABLETS (4 MG TOTAL) BY MOUTH 2 (TWO) TIMES A DAY 12/29/20   EL Sanabria   diphenhydrAMINE (BENADRYL) 25 mg tablet Take 25 mg by mouth every 6 (six) hours as needed for itching    Historical Provider, MD   metolazone (ZAROXOLYN) 5 mg tablet TAKE 1 TABLET (5 MG TOTAL) BY MOUTH AS NEEDED (TAKE 1 TABLET 30 MINS BEFORE YOUR BUMEX FOR WEIGHT GAIN OF 3LBS IN ONE DAY OR 5LBS IN ONE WEEK  PLEASE TAKE AN EXTRA DOSE OF POTASSIUM WITH THIS ) 1/22/21   EL Shields   metoprolol tartrate (LOPRESSOR) 25 mg tablet Take 25 mg by mouth every 12 (twelve) hours    Historical Provider, MD   potassium chloride (K-DUR,KLOR-CON) 20 mEq tablet Take 1 tablet (20 mEq total) by mouth 2 (two) times a day 11/5/20   EL Shields   warfarin (COUMADIN) 2 mg tablet Anticoagulation needed for afib 10/9/20   Kevin Cross DO   bumetanide (BUMEX) 2 mg tablet Take 2 tablets (4 mg total) by mouth 2 (two) times a day 10/30/20   EL Shields     I have reviewed home medications with patient personally      Allergies: No Known Allergies    Social History:     Marital Status: /Civil Union   Patient Pre-hospital Living Situation: home with wife   Patient Pre-hospital Level of Mobility: independent   Patient Pre-hospital Diet Restrictions: low sodium   Substance Use History:   Social History     Substance and Sexual Activity   Alcohol Use Not Currently     Social History     Tobacco Use   Smoking Status Former Smoker    Packs/day: 1 00    Quit date: Coral Dienes Years since quittin 0   Smokeless Tobacco Never Used     Social History     Substance and Sexual Activity   Drug Use Not Currently       Family History:    Family History   Problem Relation Age of Onset    No Known Problems Mother     Hypertension Father     Arthritis Family     No Known Problems Daughter        Physical Exam:     Vitals:   Blood Pressure: 94/53 (21 1515)  Pulse: (!) 48 (21 1515)  Temperature: (!) 97 3 °F (36 3 °C) (21 1330)  Respirations: 15 (21 1515)  Height: 5' 7" (170 2 cm) (21 1330)  Weight - Scale: 79 4 kg (175 lb) (21 1330)  SpO2: 95 % (21 151)    Physical Exam  Vitals signs and nursing note reviewed  Neck:      Vascular: JVD present  Cardiovascular:      Rate and Rhythm: Normal rate  Heart sounds: Murmur present  Pulmonary:      Breath sounds: Rales present  Abdominal:      Tenderness: There is no abdominal tenderness  Musculoskeletal:         General: Swelling present  Skin:     General: Skin is warm  Neurological:      Mental Status: He is alert and oriented to person, place, and time  Mental status is at baseline  Psychiatric:         Mood and Affect: Mood normal          Additional Data:     Lab Results: I have personally reviewed pertinent reports  Results from last 7 days   Lab Units 21  1418   WBC Thousand/uL 6 04   HEMOGLOBIN g/dL 9 0*   HEMATOCRIT % 28 7*   PLATELETS Thousands/uL 113*   NEUTROS PCT % 60   LYMPHS PCT % 17   MONOS PCT % 20*   EOS PCT % 3     Results from last 7 days   Lab Units 21  0849   POTASSIUM mmol/L 4 0   CHLORIDE mmol/L 106   CO2 mmol/L 31   BUN mg/dL 112*   CREATININE mg/dL 4 19*   CALCIUM mg/dL 9 2     Results from last 7 days   Lab Units 21  0849   INR  3 39*       Imaging: I have personally reviewed pertinent reports  X-ray Chest 1 View Portable    Result Date: 2021  Narrative: CHEST INDICATION:   chest pain  Shortness of breath for last 2 days  COMPARISON:  10/4/2020; 5/8/2020 EXAM PERFORMED/VIEWS:  XR CHEST PORTABLE FINDINGS:  Heart top normal to mildly enlarged, stable from the prior study  Tortuosity of the aortic arch redemonstrated Heart shadow appears unremarkable  Atherosclerotic vascular calcifications are noted  Minimal linear densities at the left lung base may represent subsegmental atelectasis or scarring  Lungs otherwise clear  Right shoulder arthroplasty stable  Scattered spondylotic changes again noted  Impression: Minimal left basilar subsegmental atelectasis versus scarring with otherwise clear lungs  Workstation performed: [id number]     Ct Chest Wo Contrast    Result Date: 1/12/2021  Narrative: CT CHEST WITHOUT IV CONTRAST INDICATION:   S22 23XD: Sternal manubrial dissociation, subsequent encounter for fracture with routine healing  NIKHIL BENNETT's note 1/11/21 reviewed, status post aortic valve replacement over one year prior with subsequent sternotomy dehiscence  Patient being managed conservatively  COMPARISON:  Chest CTs 2/21/20 and 12/14/19 TECHNIQUE: CT examination of the chest was performed without intravenous contrast   Axial, sagittal, and coronal 2D reformatted images were created from the source data and submitted for interpretation  Radiation dose length product (DLP) for this visit:  426 1 mGy-cm   This examination, like all CT scans performed in the [facility], was performed utilizing techniques to minimize radiation dose exposure, including the use of iterative reconstruction and automated exposure control  FINDINGS: LUNGS:  1 cm right lower lobe nodule series 3/96, not seen on the 12/14/19 study PLEURA:  Unremarkable   HEART/GREAT VESSELS:  Enlarged atria Again noted is dilation of the aortic arch, with soft tissue density extending beyond the atherosclerotic calcification, this is best appreciated on series 601/85 where measures 1 3 cm in craniocaudal dimension, similar to the most recent prior  MEDIASTINUM AND CHON:  Calcified mediastinal and right hilar lymph nodes CHEST WALL AND LOWER NECK:   Again noted is sternotomy diastases with backing of the inferior 5 sternotomy wires, where they are attached only to the right sternotomy segment  The superior most wire, there is discontinuous  There is 1 8 cm of diastases of the left and right segments, which is not significantly changed when compared to the prior study  Fractures are noted within the right segment superiorly within the sternal body seen on series 602/109, and within the left segment the manubrium series 602/96, stable Additionally there is about 1 cm of superior migration of the left segment, also not significantly changed  VISUALIZED STRUCTURES IN THE UPPER ABDOMEN:  Nodular liver contour  Trace ascites  OSSEOUS STRUCTURES:  As above  Degenerative changes of the spine  Impression: 1  Ununited sternotomy as described, not significantly changed in alignment 2  New, 1 cm right lower lobe pulmonary nodule  Based on current Fleischner Society 2017 Guidelines on incidental pulmonary nodule, either PET/CT scan evaluation, tissue sampling or short term interval followup non-contrast CT followup (initailly in 3 months) may be considered appropriate  3   Cirrhotic morphology The study was marked in EPIC for significant notification  Workstation performed: [id number]       EKG, Pathology, and Other Studies Reviewed on Admission:   · EKG: reviewed     Allscripts / Epic Records Reviewed: Yes     ** Please Note: This note has been constructed using a voice recognition system   **

## 2021-01-26 NOTE — ASSESSMENT & PLAN NOTE
Lab Results   Component Value Date    EGFR 13 01/26/2021    EGFR 19 01/05/2021    EGFR 21 11/11/2020    CREATININE 4 19 (H) 01/26/2021    CREATININE 2 97 (H) 01/05/2021    CREATININE 2 78 (H) 11/11/2020   · Baseline creatinine 2-2 9 approximately  4 19 earlier today on outpatient labs  Likely secondary to volume overload    · Hold norvasc 2/2 soft BP  · Avoid nephrotoxins and hypotension  · Low threshold for nephrology consult  · BMP in AM  · Known to Dr Wilson Jaramillo as outpatient

## 2021-01-26 NOTE — TELEPHONE ENCOUNTER
Tried calling patient, no answer  Spoke with granddaughter Rozina Ornelas regarding patient's lab results  BUN now 112 with a creat of 4 19 up from 3  States patient not feeling that much better with additional day on Metolazone  Took it M/W/Friday last week but with no weight loss and ongoing edema  Gave instructions to hold further Metolazone dosing this week  Will see patient in office this Friday for face to face visit  Will also reach out to patient's nephrologist for his recs and sooner appointment  Patient and daughter verbalized understanding  Will repeat BMP again early next week

## 2021-01-26 NOTE — TELEPHONE ENCOUNTER
After further discussion with Dr Sandy Valladares today, decision made to send patient to ER for further evaluation and likely admission  Discussed this with granddaughter, Yuliana Mcmullen, who will transport patient there  All questions answered  Verbalized understanding of these recommendations

## 2021-01-26 NOTE — ASSESSMENT & PLAN NOTE
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

## 2021-01-27 LAB
ALBUMIN SERPL BCP-MCNC: 2.9 G/DL (ref 3.5–5)
ALP SERPL-CCNC: 79 U/L (ref 46–116)
ALT SERPL W P-5'-P-CCNC: 20 U/L (ref 12–78)
ANION GAP SERPL CALCULATED.3IONS-SCNC: 6 MMOL/L (ref 4–13)
AST SERPL W P-5'-P-CCNC: 26 U/L (ref 5–45)
BASOPHILS # BLD AUTO: 0.02 THOUSANDS/ΜL (ref 0–0.1)
BASOPHILS NFR BLD AUTO: 0 % (ref 0–1)
BILIRUB SERPL-MCNC: 0.62 MG/DL (ref 0.2–1)
BUN SERPL-MCNC: 115 MG/DL (ref 5–25)
CALCIUM ALBUM COR SERPL-MCNC: 9.9 MG/DL (ref 8.3–10.1)
CALCIUM SERPL-MCNC: 9 MG/DL (ref 8.3–10.1)
CHLORIDE SERPL-SCNC: 107 MMOL/L (ref 100–108)
CO2 SERPL-SCNC: 31 MMOL/L (ref 21–32)
CREAT SERPL-MCNC: 3.94 MG/DL (ref 0.6–1.3)
EOSINOPHIL # BLD AUTO: 0.17 THOUSAND/ΜL (ref 0–0.61)
EOSINOPHIL NFR BLD AUTO: 3 % (ref 0–6)
ERYTHROCYTE [DISTWIDTH] IN BLOOD BY AUTOMATED COUNT: 15.8 % (ref 11.6–15.1)
GFR SERPL CREATININE-BSD FRML MDRD: 14 ML/MIN/1.73SQ M
GLUCOSE SERPL-MCNC: 86 MG/DL (ref 65–140)
HCT VFR BLD AUTO: 26.5 % (ref 36.5–49.3)
HGB BLD-MCNC: 8.3 G/DL (ref 12–17)
IMM GRANULOCYTES # BLD AUTO: 0.03 THOUSAND/UL (ref 0–0.2)
IMM GRANULOCYTES NFR BLD AUTO: 1 % (ref 0–2)
INR PPP: 3.53 (ref 0.84–1.19)
LYMPHOCYTES # BLD AUTO: 0.83 THOUSANDS/ΜL (ref 0.6–4.47)
LYMPHOCYTES NFR BLD AUTO: 16 % (ref 14–44)
MCH RBC QN AUTO: 30.1 PG (ref 26.8–34.3)
MCHC RBC AUTO-ENTMCNC: 31.3 G/DL (ref 31.4–37.4)
MCV RBC AUTO: 96 FL (ref 82–98)
MONOCYTES # BLD AUTO: 0.98 THOUSAND/ΜL (ref 0.17–1.22)
MONOCYTES NFR BLD AUTO: 19 % (ref 4–12)
NEUTROPHILS # BLD AUTO: 3.03 THOUSANDS/ΜL (ref 1.85–7.62)
NEUTS SEG NFR BLD AUTO: 61 % (ref 43–75)
NRBC BLD AUTO-RTO: 0 /100 WBCS
PLATELET # BLD AUTO: 102 THOUSANDS/UL (ref 149–390)
PMV BLD AUTO: 11.2 FL (ref 8.9–12.7)
POTASSIUM SERPL-SCNC: 4.1 MMOL/L (ref 3.5–5.3)
PROT SERPL-MCNC: 7.8 G/DL (ref 6.4–8.2)
PROTHROMBIN TIME: 35.1 SECONDS (ref 11.6–14.5)
RBC # BLD AUTO: 2.76 MILLION/UL (ref 3.88–5.62)
SODIUM SERPL-SCNC: 144 MMOL/L (ref 136–145)
WBC # BLD AUTO: 5.06 THOUSAND/UL (ref 4.31–10.16)

## 2021-01-27 PROCEDURE — 99232 SBSQ HOSP IP/OBS MODERATE 35: CPT | Performed by: NURSE PRACTITIONER

## 2021-01-27 PROCEDURE — 85025 COMPLETE CBC W/AUTO DIFF WBC: CPT | Performed by: NURSE PRACTITIONER

## 2021-01-27 PROCEDURE — 99223 1ST HOSP IP/OBS HIGH 75: CPT | Performed by: INTERNAL MEDICINE

## 2021-01-27 PROCEDURE — 99222 1ST HOSP IP/OBS MODERATE 55: CPT | Performed by: INTERNAL MEDICINE

## 2021-01-27 PROCEDURE — 80053 COMPREHEN METABOLIC PANEL: CPT | Performed by: NURSE PRACTITIONER

## 2021-01-27 PROCEDURE — 85610 PROTHROMBIN TIME: CPT | Performed by: NURSE PRACTITIONER

## 2021-01-27 RX ORDER — BUMETANIDE 0.25 MG/ML
0.5 INJECTION INTRAMUSCULAR; INTRAVENOUS CONTINUOUS
Status: DISCONTINUED | OUTPATIENT
Start: 2021-01-27 | End: 2021-02-01

## 2021-01-27 RX ADMIN — Medication 0.5 MG/HR: at 14:39

## 2021-01-27 RX ADMIN — METOPROLOL TARTRATE 25 MG: 25 TABLET, FILM COATED ORAL at 21:06

## 2021-01-27 RX ADMIN — METOPROLOL TARTRATE 25 MG: 25 TABLET, FILM COATED ORAL at 08:20

## 2021-01-27 RX ADMIN — Medication 0.5 MG/HR: at 10:43

## 2021-01-27 RX ADMIN — ATORVASTATIN CALCIUM 40 MG: 40 TABLET, FILM COATED ORAL at 08:20

## 2021-01-27 NOTE — PLAN OF CARE
Problem: Potential for Falls  Goal: Patient will remain free of falls  Description: INTERVENTIONS:  - Assess patient frequently for physical needs  -  Identify cognitive and physical deficits and behaviors that affect risk of falls    -  [place] fall precautions as indicated by assessment   - Educate patient/family on patient safety including physical limitations  - Instruct patient to call for assistance with activity based on assessment  - Modify environment to reduce risk of injury  - Consider OT/PT consult to assist with strengthening/mobility  Outcome: Progressing     Problem: CARDIOVASCULAR - ADULT  Goal: Maintains optimal cardiac output and hemodynamic stability  Description: INTERVENTIONS:  - Monitor I/O, vital signs and rhythm  - Monitor for S/S and trends of decreased cardiac output  - Administer and titrate ordered vasoactive medications to optimize hemodynamic stability  - Assess quality of pulses, skin color and temperature  - Assess for signs of decreased coronary artery perfusion  - Instruct patient to report change in severity of symptoms  Outcome: Progressing     Problem: METABOLIC, FLUID AND ELECTROLYTES - ADULT  Goal: Electrolytes maintained within normal limits  Description: INTERVENTIONS:  - Monitor labs and assess patient for signs and symptoms of electrolyte imbalances  - Administer electrolyte replacement as ordered  - Monitor response to electrolyte replacements, including repeat lab results as appropriate  - Instruct patient on fluid and nutrition as appropriate  Outcome: Progressing  Goal: Fluid balance maintained  Description: INTERVENTIONS:  - Monitor labs   - Monitor I/O and WT  - Instruct patient on fluid and nutrition as appropriate  - Assess for signs & symptoms of volume excess or deficit  Outcome: Progressing     Problem: MUSCULOSKELETAL - ADULT  Goal: Maintain or return mobility to safest level of function  Description: INTERVENTIONS:  - Assess patient's ability to carry out ADLs; assess patient's baseline for ADL function and identify physical deficits which impact ability to perform ADLs (bathing, care of mouth/teeth, toileting, grooming, dressing, etc )  - Assess/evaluate cause of self-care deficits   - Assess range of motion  - Assess patient's mobility  - Assess patient's need for assistive devices and provide as appropriate  - Encourage maximum independence but intervene and supervise when necessary  - Involve family in performance of ADLs  - Assess for home care needs following discharge   - Consider OT consult to assist with ADL evaluation and planning for discharge  - Provide patient education as appropriate  Outcome: Progressing

## 2021-01-27 NOTE — ASSESSMENT & PLAN NOTE
Lab Results   Component Value Date    EGFR 14 01/27/2021    EGFR 13 01/26/2021    EGFR 19 01/05/2021    CREATININE 3 94 (H) 01/27/2021    CREATININE 4 19 (H) 01/26/2021    CREATININE 2 97 (H) 01/05/2021   · Baseline creatinine 2-2 9 approximately  4 19 on presentation, 3 94 today  Likely secondary to volume overload  · Hold norvasc 2/2 soft BP  · Nephrology following     · Avoid nephrotoxins and hypotension  · BMP in AM  · Known to Dr Tamar Mccall as outpatient

## 2021-01-27 NOTE — UTILIZATION REVIEW
Initial Clinical Review    Admission: Date/Time/Statement:   Admission Orders (From admission, onward)     Ordered        01/26/21 1538  Inpatient Admission  Once                   Orders Placed This Encounter   Procedures    Inpatient Admission     Standing Status:   Standing     Number of Occurrences:   1     Order Specific Question:   Level of Care     Answer:   Med Surg [16]     Order Specific Question:   Estimated length of stay     Answer:   More than 2 Midnights     Order Specific Question:   Certification     Answer:   I certify that inpatient services are medically necessary for this patient for a duration of greater than two midnights  See H&P and MD Progress Notes for additional information about the patient's course of treatment  ED Arrival Information     Expected Arrival Acuity Means of Arrival Escorted By Service Admission Type    1/26/2021 12:03 1/26/2021 12:42 Emergent Walk-In Self Hospitalist Emergency    Arrival Complaint    chf        Chief Complaint   Patient presents with    Shortness of Breath     SOB x2 days  Assessment/Plan:       68year old male presents to ed from home for evaluation and treatment of " fluid in his lungs" and short of breath  PMHX:  CHF, CKD, HTN, CAD, AORTIC DISSECTION  Clinical assessment significant for shortness of breath, blle edema  greater than baseline, bilateral  rales, nt-bnp  4,828, chest x ray left basilar atelectasis  Ekg : sinus bradycardia  Treated in ed with iv bumex and po metoprolol  Admit to inpatient medical surgical for acute on chronic diastolic congestive heart failure  Plan includes: Iv bumex gtt  Metoprolol po  Telemetry, cardiac diet with fluid restriction, intake /output, daily weight  1-27    Continue iv bumex  Consult nephrology completed  Consult heart failure completed  Consult heart failure     Pt admitted with acute on chronic HFpEF, cardiorenal in nature  Echo's and recent outpt course reviewed   He is on high dose diuretic as outpt and he states he urinates well from his diuretics but continues to gain weight which means he is drinking more than he urinates out  Beyond IV diuresis, will stress HF teaching, consult nutrition for dietary recommendations          Consult nephrology    1  Acute kidney injury, suspecting likely cardiorenal syndrome with increasing weight, peak creatinine of 4 19 current creatinine 3 94   2  CKD stage 4 baseline creatinine mid 2s estimated GFR low 20s  3  Volume overload with a 20 lb reported weight gain  To initiate Bumex infusion  4  Diastolic heart failure with ejection fraction of 60%  5  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  6  Anemia of chronic kidney disease hemoglobin currently 8 3, continue monitor closely, check iron stores  7   CKD associated mineral bone disorder, continue with renal restricted diet     Plan:  · Patient to initiate Bumex therapy, hopefully with improved diuresis/weight, renal function will continue to improve  · Check iron stores given worsening anemia  · Monitor renal function as well as urine output closely        ED Triage Vitals   01/26/21 1330 01/26/21 1330 01/26/21 1330 01/26/21 1330 01/26/21 1330   (!) 97 3 °F (36 3 °C) 75 20 109/60 97 %      Oral Monitor         No Pain          01/27/21 78 9 kg (173 lb 15 1 oz)     Additional Vital Signs:       Date/Time  Temp  Pulse  Resp  BP  MAP (mmHg)  SpO2  O2 Device   01/27/21 10:36:21  97 2 °F (36 2 °C)  Abnormal   --  18  115/63  80  --  --   01/27/21 0800  --  --  --  --  --  --  None (Room air)   01/27/21 07:15:10  98 1 °F (36 7 °C)  58  20  118/68  85  97 %  --   01/27/21 02:57:02  98 1 °F (36 7 °C)  57  16  101/50  67  99 %  --   01/27/21 0200  --  54Abnormal   --  --  --  97 %  --   01/27/21 0044  --  --  --  --  --  --  None (Room air)   01/27/21 00:08:28  98 1 °F (36 7 °C)  48Abnormal   16  100/60  73  98 % None (Room air)   01/26/21 1945  --  46Abnormal   20  108/57  78  98 %  None (Room air)   01/26/21 1815  --  60  19  103/59  75  98 %  None (Room air)   01/26/21 1715  --  52Abnormal   20  126/57  81  100 %  None (Room air)   01/26/21 1615  --  52Abnormal   22  98/59  73  95 %  None (Room air)   01/26/21 1515  --  48Abnormal   15  94/53  68  95 %  None (Room air)               Pertinent Labs/Diagnostic Test Results:       Tue Jan 26, 2021   1342 Procedure Note: EKG  Date/Time: 01/26/21 1:43 PM     Indications / Diagnosis: SOB     The EKG demonstrates:  Rate: 52  Rhythm: sinus gwen  Intervals: normal intervals  Axis: normal axis  QRS/Blocks: normal QRS  ST Changes: No acute ST Changes, no STD/CHRISTOPHER                X-ray chest 1 view portable    (01/26 1526)      Minimal left basilar subsegmental atelectasis versus scarring with otherwise clear lungs            Results from last 7 days   Lab Units 01/27/21  0504 01/26/21  1418   WBC Thousand/uL 5 06 6 04   HEMOGLOBIN g/dL 8 3* 9 0*   HEMATOCRIT % 26 5* 28 7*   PLATELETS Thousands/uL 102* 113*   NEUTROS ABS Thousands/µL 3 03 3 61         Results from last 7 days   Lab Units 01/27/21  0504 01/26/21  0849   SODIUM mmol/L 144 142   POTASSIUM mmol/L 4 1 4 0   CHLORIDE mmol/L 107 106   CO2 mmol/L 31 31   ANION GAP mmol/L 6 5   BUN mg/dL 115* 112*   CREATININE mg/dL 3 94* 4 19*   EGFR ml/min/1 73sq m 14 13   CALCIUM mg/dL 9 0 9 2     Results from last 7 days   Lab Units 01/27/21  0504   AST U/L 26   ALT U/L 20   ALK PHOS U/L 79   TOTAL PROTEIN g/dL 7 8   ALBUMIN g/dL 2 9*   TOTAL BILIRUBIN mg/dL 0 62         Results from last 7 days   Lab Units 01/27/21  0504   GLUCOSE RANDOM mg/dL 86       Results from last 7 days   Lab Units 01/26/21  1418   TROPONIN I ng/mL <0 02         Results from last 7 days   Lab Units 01/27/21  0504 01/26/21  0849   PROTIME seconds 35 1* 34 0*   INR  3 53* 3 39*     Results from last 7 days   Lab Units 01/26/21  0849   TSH 3RD GENERATON uIU/mL 3 610 Results from last 7 days   Lab Units 01/26/21  1418   NT-PRO BNP pg/mL 4,828*       ED Treatment:   Medication Administration from 01/26/2021 1203 to 01/26/2021 0469       Date/Time Order Dose Route Action     01/26/2021 0013 bumetanide (BUMEX) injection 2 mg 2 mg Intravenous Given        Past Medical History:   Diagnosis    Arthritis    BPH without urinary obstruction    CKD (chronic kidney disease), stage III    baseline 1 6-1 7    GERD (gastroesophageal reflux disease)    Hyperlipidemia    Hypertension    MI [facility]my[facility] infarction) ([facility] )     Present on Admission:   Acute on chronic diastolic (congestive) heart failure (HCC)   Benign essential hypertension   Hyperlipidemia   Dissection of thoracic aorta (HCC)   Stage 4 chronic kidney disease (HCC)   Anemia      Admitting Diagnosis:     CHF (congestive heart failure) (HCC) [I50 9]  Lower extremity edema [R60 0]  Dyspnea [R06 00]  AFSHIN (acute kidney injury) ([facility] ) [O84 1]  Acute diastolic CHF (congestive heart failure) ([facility]) [I50 31]    Age/Sex: 68 y o  male     Admission Orders:    atorvastatin, 40 mg, Oral, Daily  metoprolol tartrate, 25 mg, Oral, Q12H [facility]      Continuous IV Infusions:  bumetanide, 0 5 mg/hr, Intravenous, Continuous      PRN Meds:  acetaminophen, 650 mg, Oral, Q6H PRN  ondansetron, 4 mg, Intravenous, Q6H PRN  sodium chloride (PF), 3 mL, Intravenous, Q1H PRN        IP CONSULT TO HEART FAILURE SERVICE  IP CONSULT TO NUTRITION SERVICES  IP CONSULT TO NEPHROLOGY    Network Utilization Review Department  ATTENTION: Please call with any questions or concerns to [phone] and carefully listen to the prompts so that you are directed to the right person  All voicemails are confidential   Nadege Ryan all requests for admission clinical reviews, approved or denied determinations and any other requests to dedicated fax number below belonging to the campus where the patient is receiving treatment   List of dedicated fax numbers for the Facilities:  FACILITY NAME UR FAX NUMBER   ADMISSION DENIALS (Administrative/Medical Necessity) [phone]   [address] (Maternity/NICU/Pediatrics) 261 [facility],[address] Floor [facility] [address] [place] [address]  [phone]   Treva Lau 7557 (  Enzo Mcnulty "Manuela" 103) [address] [address] [facility] [phone]   [place] [address] [phone]

## 2021-01-27 NOTE — CONSULTS
Advanced Heart Failure / Pulmonary Hypertension Service Consultation    [facility] 68 y o  male  MRN: [id number]  Unit/Bed#: CW2 213-01; Encounter: [id number]    Assessment:  Principal Problem:    Acute on chronic diastolic (congestive) heart failure (HCC)  Active Problems:    Benign essential hypertension    Dissection of thoracic aorta (HCC)    Hyperlipidemia    Anemia    Stage 4 chronic kidney disease (HCC)    Atrial fibrillation (HCC)      HPI:   Uday Urban is a 68-year-old man with PMH as below who presented to [facility] on 01/26/2021 after being contacted by cardiology office to review abnormal renal function  Patient had increased frequency of metolazone dosing and had minimal improvement with no reported weight loss or symptomatic improvement  Case was discussed with Dr Cari Burkett (per documentation) and decision was made to have patient proceed to ED for further evaluation  Patient seen and examined  No current complaints  Does report study weight gain over the past few weeks  Has been taking diuretic as prescribed and reports good urinary response to medication  Denies any significant dietary changes; does not add salt to foods  Not very forthcoming with current dietary habits when asked  Reports minimal urinary response after receiving IV Bumex 2 mg yesterday afternoon  Heart failure service was consulted for "acute diastolic CHF " Patient follows with Dr Joya Guido for outpatient cardiology  Today's Plan:   Will start IV Bumex 0 5 mg/hour   Close monitoring of potassium level with daily BMPs recommended   Strict I&Os with daily weights  Will check for bed availability on heart failure floor   Coumadin currently on hold due to supra-therapeutic INR   Will consult nephrology and nutrition services  Plan:  Acute on chronic HFpEF; LVEF 60%; LVIDd 4 69 cm; NYHA III; ACC/AHA Stage C              Etiology: Afib, HTN phenotype  TTE 04/24/2019: LVEF 60%  LVIDd 4 31 cm  IVSd 1 4 cm  Grade 1 DD  Normal RV size and RVSF  Mild TR  PASP 35 mmHg  TTE 10/06/2020: LVEF 60%  LVIDd 4 69 cm  IVSd 1 41 cm  Grade 2 DD  Normal RV size and RVSF  KUNAL  Mild MR  Severe TR  Reviewed importance of low sodium diet and fluid restriction  Strict I&Os with daily weights  CV diet with 2L fluid restriction       Neurohormonal Blockade:  --Beta Blocker: metoprolol tartrate 25 mg q12 hours  --ARNi / ACEi / ARB: No   --Aldosterone Antagonist: No   --SGLT2 Inhibitor: No   --Home Diuretic: PO Bumex 4 mg BID with PRN metolazone 5 mg and potassium 20 mEq BID  --Inpatient Diuretic: IV Bumex 0 5 mg/hour       Sudden Cardiac Death Risk Reduction:  --LVEF >35%     Electrical Resynchronization:  --Candidacy for BiV device: narrow QRS     Advanced Therapies: Will continue to monitor      Atrial fibrillation, paroxysmal              [id number] = 4 (age, HF, HTN)  Anticoagulation on Coumadin  Continue on BB as above       Chronic kidney disease, stage IV              Baseline creatinine of 2 3-2 5  Briefly on dialysis in February 2020  Today, creatinine of 3 94 (down from 4 19 on 01/26)  Follows with Dr Linda Bernard as outpatient       History of Type A aortic dissection              S/p emergent replacement of ascending aorta with hemiarch reconstruction and aortic valve resuspension with a 26 mm Dacron graft on 12/15/2019 by Dr Niles Peryr      Hypertension  Hyperlipidemia  Benign prostatic hyperplasia  History of COVID-19 infection: diagnosed in 07/2020       Past Medical History:   Diagnosis Date    Arthritis     BPH without urinary obstruction     CKD (chronic kidney disease), stage III     baseline 1 6-1 7    GERD (gastroesophageal reflux disease)     Hyperlipidemia     Hypertension     MI (myocardial infarction) ([facility] )        Review of Systems   Constitutional: Positive for unexpected weight change  Negative for activity change, appetite change, fatigue and fever  HENT: Negative for congestion, postnasal drip, rhinorrhea, sneezing, sore throat and trouble swallowing  Eyes: Negative  Respiratory: Positive for shortness of breath  Negative for cough and chest tightness  Cardiovascular: Positive for leg swelling  Negative for chest pain and palpitations  Gastrointestinal: Positive for abdominal distention  Negative for abdominal pain, diarrhea, nausea and vomiting  Endocrine: Negative  Genitourinary: Negative for decreased urine volume, difficulty urinating, dysuria, frequency, hematuria and urgency  Musculoskeletal: Negative  Skin: Negative  Allergic/Immunologic: Negative  Neurological: Negative for dizziness, tremors, syncope, weakness, light-headedness and headaches  Hematological: Negative  Psychiatric/Behavioral: Negative for agitation, confusion and sleep disturbance  The patient is not nervous/anxious  14-point ROS completed and negative except as stated above and/or in the HPI      Current Facility-Administered Medications:     acetaminophen (TYLENOL) tablet 650 mg, 650 mg, Oral, Q6H PRN, EL Roth    atorvastatin (LIPITOR) tablet 40 mg, 40 mg, Oral, Daily, EL Roth, 40 mg at 01/27/21 0820    bumetanide (BUMEX) 12 5 mg infusion 50 mL, 0 5 mg/hr, Intravenous, Continuous, Ashu Stanton PA-C    metoprolol tartrate (LOPRESSOR) tablet 25 mg, 25 mg, Oral, Q12H [facility], Fani Muñoz PA-C    ondansetron (ZOFRAN) injection 4 mg, 4 mg, Intravenous, Q6H PRN, EL Roth    Insert peripheral IV, , , Once **AND** sodium chloride (PF) 0 9 % injection 3 mL, 3 mL, Intravenous, Q1H PRN, EL Roth    No Known Allergies     Social History     Socioeconomic History    Marital status: /Civil Union     Spouse name: Not on file    Number of children: Not on file    Years of education: Not on file   [employer] level: Not on file   Occupational History    Not on file   Social Needs    Financial resource strain: Not hard at all    Food insecurity     Worry: Never true     Inability: Never true   [employer] needs     Medical: No     Non-medical: No   Tobacco Use    Smoking status: Former Smoker     Packs/day:  00     Quit date:      Years since quittin 1    Smokeless tobacco: Never Used   Substance and Sexual Activity    Alcohol use: Not Currently     Frequency: Never     Drinks per session: Patient refused     Binge frequency: Never    Drug use: Not Currently    Sexual activity: Not Currently     Partners: Female   Lifestyle    Physical activity     Days per week: Not on file     Minutes per session: Not on file    Stress: Not on file   Relationships    Social connections     Talks on phone: Not on file     Gets together: Not on file     Attends [employer] service: Not on file     Active member of club or organization: Not on file     Attends meetings of clubs or organizations: Not on file     Relationship status: Not on file    Intimate partner violence     Fear of current or ex partner: Not on file     Emotionally abused: Not on file     Physically abused: Not on file     Forced sexual activity: Not on file   Other Topics Concern    Not on file   Social History Narrative    Daily caffeine consumption, 1 serving a day    Drinks coffee      Family History   Problem Relation Age of Onset    No Known Problems Mother     Hypertension Father     Arthritis Family     No Known Problems Daughter        Vitals:  Blood pressure 118/68, pulse 58, temperature 98 1 °F (36 7 °C), resp  rate 20, height 5' 7" (1 702 m), weight 78 9 kg (173 lb 15 1 oz), SpO2 97 %  Body mass index is 27 24 kg/m²  I/O last 3 completed shifts:   In: 100 [P O :100]  Out: 600 [Urine:600]    Wt Readings from Last 3 Encounters:   21 78 9 kg (173 lb 15 1 oz)   21 80 3 kg (177 lb)   21 78 9 kg (174 lb) Vitals:    21 0200 21 0257 21 0556 21 0715   BP:  101/50  118/68   BP Location:       Pulse: (!) 54 57  58   Resp:  16  20   Temp:  98 1 °F (36 7 °C)  98 1 °F (36 7 °C)   TempSrc:       SpO2: 97% 99%  97%   Weight:   78 9 kg (173 lb 15 1 oz)    Height:           Physical Exam  Vitals signs reviewed  Constitutional:       General: He is awake  He is not in acute distress  Appearance: Normal appearance  He is well-developed and overweight  HENT:      Head: Normocephalic  Nose: Nose normal       Mouth/Throat:      Mouth: Mucous membranes are moist    Eyes:      General: No scleral icterus  Conjunctiva/sclera: Conjunctivae normal    Neck:      Musculoskeletal: Neck supple  Vascular: JVD present  Trachea: No tracheal deviation  Cardiovascular:      Rate and Rhythm: Regular rhythm  Bradycardia present  No extrasystoles are present  Pulses: Normal pulses  Heart sounds: Murmur present  Pulmonary:      Effort: Pulmonary effort is normal  No tachypnea, bradypnea or respiratory distress  Breath sounds: Normal air entry  Examination of the right-lower field reveals decreased breath sounds  Examination of the left-lower field reveals decreased breath sounds  Decreased breath sounds present  No wheezing or rhonchi  Abdominal:      General: Bowel sounds are normal  There is distension  Palpations: Abdomen is soft  Tenderness: There is no abdominal tenderness  Musculoskeletal:      Right lower le+ Pitting Edema present  Left lower le+ Pitting Edema present  Skin:     General: Skin is warm and dry  Coloration: Skin is pale  Neurological:      General: No focal deficit present  Mental Status: He is alert and oriented to person, place, and time     Psychiatric:         Attention and Perception: Attention normal          Mood and Affect: Mood and affect normal          Speech: Speech normal          Behavior: Behavior normal  Behavior is cooperative  Thought Content:  Thought content normal      Central Line (day, reason): No    [place]ills Catheter (day, reason): No      Labs & Results:  Results from last 7 days   Lab Units 01/26/21  1418   TROPONIN I ng/mL <0 02     Results from last 7 days   Lab Units 01/27/21  0504 01/26/21  1418   WBC Thousand/uL 5 06 6 04   HEMOGLOBIN g/dL 8 3* 9 0*   HEMATOCRIT % 26 5* 28 7*   PLATELETS Thousands/uL 102* 113*         Results from last 7 days   Lab Units 01/27/21  0504 01/26/21  0849   POTASSIUM mmol/L 4 1 4 0   CHLORIDE mmol/L 107 106   CO2 mmol/L 31 31   BUN mg/dL 115* 112*   CREATININE mg/dL 3 94* 4 19*   CALCIUM mg/dL 9 0 9 2   ALK PHOS U/L 79  --    ALT U/L 20  --    AST U/L 26  --      Results from last 7 days   Lab Units 01/27/21  0504 01/26/21  0849   INR  3 53* 3 39*     Mary Jane Iyer PA-C

## 2021-01-27 NOTE — PROGRESS NOTES
[facility]    Progress Note - Oliver SEBASTIAN Core 1940, 68 y o  male MRN: [id number]    Unit/Bed#: CW2 213-01 Encounter: [id number]    Primary Care Provider: EL Hernández   Date and time admitted to hospital: 1/26/2021  1:24 PM    * Acute on chronic diastolic (congestive) heart failure (HCC)  Assessment & Plan  Wt Readings from Last 3 Encounters:   01/27/21 78 9 kg (173 lb 15 1 oz)   01/19/21 80 3 kg (177 lb)   01/11/21 78 9 kg (174 lb)     · Presents with 15 lb weight gain in one month, GONZALES, worsening lower extremity edema  Outpatient cardiology team ordered extra dose of diuretic however no improvement  Patient was noted to have AFSHIN on outpatient labs and referred to ED  · Outpatient diuretic regimen bumex 4 mg PO BID with k 20 BID and metolazone 5 mg  2 x weekly  · Heart failure following, now on IV Bumex gtt at 0 5 mg/hr  · Last echo 10/2020 showed preserved EF  Mild MR  Severe TR    · I/O, daily weights, low NA diet with FR  · Monitor volume status        Atrial fibrillation ([address] )  Assessment & Plan  · Rate controlled on Lopressor  · Anticoagulated on Coumadin  INR supratherapeutic at 3 53, will hold Coumadin for now  Repeat INR in a m     Stage 4 chronic kidney disease [facility])  Assessment & Plan  Lab Results   Component Value Date    EGFR 14 01/27/2021    EGFR 13 01/26/2021    EGFR 19 01/05/2021    CREATININE 3 94 (H) 01/27/2021    CREATININE 4 19 (H) 01/26/2021    CREATININE 2 97 (H) 01/05/2021   · Baseline creatinine 2-2 9 approximately  4 19 on presentation, 3 94 today  Likely secondary to volume overload  · Hold norvasc 2/2 soft BP  · Nephrology following  · Avoid nephrotoxins and hypotension  · BMP in AM  · Known to Dr Daya Lovett as outpatient    Benign essential hypertension  Assessment & Plan  · Blood pressure on soft side  · Hold norvasc  Continue Lopressor with hold parameters     · Monitor      Dissection of thoracic aorta (HCC)  Assessment & Plan  · 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  · 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  Risk associated with surgical options therefore recommended continued observation  Hyperlipidemia  Assessment & Plan  · Continue statin    Anemia  Assessment & Plan  · Chronic, stable at baseline  Pharmacologic: Pharmacologic VTE Prophylaxis contraindicated due to Supratherapeutic INR  Mechanical VTE Prophylaxis in Place: Yes    Patient Centered Rounds: I have performed bedside rounds with nursing staff today  Discussions with Specialists or Other Care Team Provider:  Nursing, case management    Education and Discussions with Family / Patient:  Patient and granddaughter over phone     Time Spent for Care: 30 minutes  More than 50% of total time spent on counseling and coordination of care as described above  Current Length of Stay: 1 day(s)    Current Patient Status: Inpatient   Certification Statement: The patient will continue to require additional inpatient hospital stay due to Diuresis, AFSHIN, plan as above    Discharge Plan / Estimated Discharge Date:  Anticipate several days      Code Status: Level 1 - Full Code      Subjective:   Patient offers no acute complaints, feeling better today, less SOB  Noted that LE edema slightly improved  Objective:     Vitals:   Temp (24hrs), Av 8 °F (36 6 °C), Min:97 2 °F (36 2 °C), Max:98 1 °F (36 7 °C)    Temp:  [97 2 °F (36 2 °C)-98 1 °F (36 7 °C)] 97 2 °F (36 2 °C)  HR:  [46-75] 58  Resp:  [15-22] 18  BP: ()/(50-68) 115/63  SpO2:  [95 %-100 %] 97 %  Body mass index is 27 24 kg/m²  Input and Output Summary (last 24 hours): Intake/Output Summary (Last 24 hours) at 2021 1132  Last data filed at 2021 3929  Gross per 24 hour   Intake 640 ml   Output 1000 ml   Net -360 ml       Physical Exam:     Physical Exam  Vitals signs and nursing note reviewed     Neck: Vascular: JVD present  Cardiovascular:      Rate and Rhythm: Normal rate  Heart sounds: Murmur present  Pulmonary:      Breath sounds: Rales present  Abdominal:      Tenderness: There is no abdominal tenderness  Musculoskeletal:         General: Swelling (lower extremities slightly improved from yesterday) present  Skin:     General: Skin is warm  Neurological:      Mental Status: He is alert and oriented to person, place, and time  Mental status is at baseline  Psychiatric:         Mood and Affect: Mood normal          Additional Data:     Labs:    Results from last 7 days   Lab Units 01/27/21  0504   WBC Thousand/uL 5 06   HEMOGLOBIN g/dL 8 3*   HEMATOCRIT % 26 5*   PLATELETS Thousands/uL 102*   NEUTROS PCT % 61   LYMPHS PCT % 16   MONOS PCT % 19*   EOS PCT % 3     Results from last 7 days   Lab Units 01/27/21  0504   POTASSIUM mmol/L 4 1   CHLORIDE mmol/L 107   CO2 mmol/L 31   BUN mg/dL 115*   CREATININE mg/dL 3 94*   CALCIUM mg/dL 9 0   ALK PHOS U/L 79   ALT U/L 20   AST U/L 26     Results from last 7 days   Lab Units 01/27/21  0504   INR  3 53*         Recent Cultures (last 7 days):           Last 24 Hours Medication List:   Current Facility-Administered Medications   Medication Dose Route Frequency Provider Last Rate    acetaminophen  650 mg Oral Q6H PRN EL Roth      atorvastatin  40 mg Oral Daily EL Roth      bumetanide  0 5 mg/hr Intravenous Continuous Norm TRINY Cho 0 5 mg/hr (01/27/21 1043)    metoprolol tartrate  25 mg Oral Q12H [facility] Fani Muñoz PA-C      ondansetron  4 mg Intravenous Q6H PRN EL Roth      sodium chloride (PF)  3 mL Intravenous Q1H PRN EL Solano          Today, Patient Was Seen By: EL Burnham    ** Please Note: Dragon 360 Dictation voice to text software may have been used in the creation of this document   **

## 2021-01-27 NOTE — ASSESSMENT & PLAN NOTE
· Rate controlled on Lopressor  · Anticoagulated on Coumadin  INR supratherapeutic at 3 53, will hold Coumadin for now  Repeat INR in a m  Misael Patel

## 2021-01-27 NOTE — ASSESSMENT & PLAN NOTE
Wt Readings from Last 3 Encounters:   01/27/21 78 9 kg (173 lb 15 1 oz)   01/19/21 80 3 kg (177 lb)   01/11/21 78 9 kg (174 lb)     · Presents with 15 lb weight gain in one month, GONZALES, worsening lower extremity edema  Outpatient cardiology team ordered extra dose of diuretic however no improvement  Patient was noted to have AFSHIN on outpatient labs and referred to ED  · Outpatient diuretic regimen bumex 4 mg PO BID with k 20 BID and metolazone 5 mg  2 x weekly  · Heart failure following, now on IV Bumex gtt at 0 5 mg/hr  · Last echo 10/2020 showed preserved EF  Mild MR   Severe TR    · I/O, daily weights, low NA diet with FR  · Monitor volume status

## 2021-01-27 NOTE — CONSULTS
Consultation - Nephrology   [facility] A Core 68 y o  male MRN: [id number]  Unit/Bed#: CW2 213-01 Encounter: [id number]      Assessment/Plan:  1  Acute kidney injury, suspecting likely cardiorenal syndrome with increasing weight, peak creatinine of 4 19 current creatinine 3 94   2  CKD stage 4 baseline creatinine mid 2s estimated GFR low 20s  3  Volume overload with a 20 lb reported weight gain  To initiate Bumex infusion  4  Diastolic heart failure with ejection fraction of 60%  5  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  6  Anemia of chronic kidney disease hemoglobin currently 8 3, continue monitor closely, check iron stores  7  CKD associated mineral bone disorder, continue with renal restricted diet    Plan:  · Patient to initiate Bumex therapy, hopefully with improved diuresis/weight, renal function will continue to improve  · Check iron stores given worsening anemia  · Monitor renal function as well as urine output closely  · No other changes in current regimen    History of Present Illness   Physician Requesting Consult: Lai Marquez MD  Reason for Consult / Principal Problem:  Acute kidney injury  HPI: Manuel Valadez is a 68y o  year old male who presents with abnormal laboratory studies, poor response to home diuretic therapy as well as 20 lb weight gain  Patient is a 51-year-old male with well-known history of CKD stage 4  Follows with Dr Demetria John  Baseline creatinine in the mid 2s  Patient complains of a 20 lb weight gain over last month  Recently he has not been responding to home diuretic therapy including metolazone daily  Outpatient laboratory studies showed an elevated creatinine 4 19 BUN of 112  Patient was recommended to come to the ER for further evaluation  Currently patient states he is feeling slightly improved    No reports of chest pain or shortness of breath at rest   Does complain of abdominal distension as well as lower extremity swelling  Denies any appetite changes including nausea vomiting or diarrhea  History obtained from chart review and the patient    Review of Systems   Constitutional: Positive for unexpected weight change  Negative for appetite change  Respiratory: Negative for chest tightness and shortness of breath  Cardiovascular: Positive for leg swelling  Negative for chest pain  Gastrointestinal: Positive for abdominal distention  Negative for abdominal pain  Genitourinary: Positive for decreased urine volume  Negative for dysuria  Neurological: Negative for dizziness, syncope and light-headedness  Pertinent findings of a 10 point review of systems noted above otherwise all others negative    Historical Information   Patient Active Problem List   Diagnosis    Rotator cuff tear arthropathy, right    Secondary osteoarthritis of right shoulder    Benign essential hypertension    Abnormal TSH    Overweight (BMI 25 0-29  9)    Lumbar pain    Benign hypertension with CKD (chronic kidney disease) stage IV (HCC)    Benign prostatic hyperplasia without lower urinary tract symptoms    Bradycardia    SOBOE (shortness of breath on exertion)    Dissection of thoracic aorta (HCC)    S/P aorta repair    Anticoagulation goal of INR 2 to 3    Hyperphosphatemia    Hypotension    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    Complication of vascular dialysis catheter    Hyperlipidemia    Sleep disorder    Encounter for post surgical wound check    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 )    Rheumatoid arthritis ([facility] )    Atrial fibrillation (HCC)     Past Medical History:   Diagnosis Date    Arthritis     BPH without urinary obstruction     CKD (chronic kidney disease), stage III     baseline 1 6-1 7    GERD (gastroesophageal reflux disease)     Hyperlipidemia     Hypertension     MI (myocardial infarction) ([facility] )      Past Surgical History:   Procedure Laterality Date    APPENDECTOMY      CARDIAC SURGERY      COLONOSCOPY  2017    FRACTURE SURGERY      IR TEMPORARY DIALYSIS CATHETER PLACEMENT  2019    IR THORACENTESIS  2019    IR THORACENTESIS  2019    IR TUNNELED DIALYSIS CATHETER CHECK/CHANGE/REPOSITION/ANGIOPLASTY  2020    IR TUNNELED DIALYSIS CATHETER PLACEMENT  2020    IR TUNNELED DIALYSIS CATHETER REMOVAL  3/4/2020    [place] ASCEND AORTA GRAFT W ROOT REPLACMENT  VALVE CONDUIT/CORON RECONSTRUCT N/A 12/15/2019    Procedure: BENTALL PROCEDURE (ASCENDING AORTIC REPAIR) with 26mm Gelweave Graft;  Surgeon: Margaret Moreno DO;  [facility];  Service: Cardiac Surgery    [place] RECONSTR TOTAL SHOULDER IMPLANT Right 2017    Procedure: ARTHROPLASTY SHOULDER REVERSE with OPEN CYST EXCISION;  Surgeon: Deshaun Bahena MD;  [facility];  Service: Orthopedics    SHOULDER SURGERY      WRIST SURGERY       Social History   Social History     Substance and Sexual Activity   Alcohol Use Not Currently    Frequency: Never    Drinks per session: Patient refused    Binge frequency: Never     Social History     Substance and Sexual Activity   Drug Use Not Currently     Social History     Tobacco Use   Smoking Status Former Smoker    Packs/day: 1 00    Quit date: 36    Years since quittin 1   Smokeless Tobacco Never Used     Family History   Problem Relation Age of Onset    No Known Problems Mother     Hypertension Father     Arthritis Family     No Known Problems Daughter        Meds/Allergies current meds:   Current Facility-Administered Medications   Medication Dose Route Frequency    acetaminophen (TYLENOL) tablet 650 mg  650 mg Oral Q6H PRN    atorvastatin (LIPITOR) tablet 40 mg  40 mg Oral Daily    bumetanide (BUMEX) 12 5 mg infusion 50 mL  0 5 mg/hr Intravenous Continuous    metoprolol tartrate (LOPRESSOR) tablet 25 mg  25 mg Oral Q12H [facility]    ondansetron (ZOFRAN) injection 4 mg  4 mg Intravenous Q6H PRN    sodium chloride (PF) 0 9 % injection 3 mL  3 mL Intravenous Q1H PRN       No Known Allergies      Objective   /68   Pulse 58   Temp 98 1 °F (36 7 °C)   Resp 20   Ht 5' 7" (1 702 m)   Wt 78 9 kg (173 lb 15 1 oz)   SpO2 97%   BMI 27 24 kg/m²     Intake/Output Summary (Last 24 hours) at 1/27/2021 1004  Last data filed at 1/27/2021 0180  Gross per 24 hour   Intake 640 ml   Output 1000 ml   Net -360 ml       Current Weight: Weight - Scale: 78 9 kg (173 lb 15 1 oz)    Physical Exam  Constitutional:       Appearance: Normal appearance  He is not ill-appearing  HENT:      Head: Normocephalic and atraumatic  Eyes:      General: No scleral icterus  Neck:      Musculoskeletal: Neck supple  Vascular: JVD present  Cardiovascular:      Rate and Rhythm: Normal rate and regular rhythm  Pulmonary:      Effort: Pulmonary effort is normal       Breath sounds: Normal breath sounds  Abdominal:      General: There is distension  Palpations: Abdomen is soft  Tenderness: There is no abdominal tenderness  There is no guarding  Musculoskeletal:         General: No deformity  Right lower leg: Edema present  Left lower leg: Edema present  Lymphadenopathy:      Cervical: No cervical adenopathy  Skin:     General: Skin is warm and dry  Findings: No rash  Neurological:      Mental Status: He is alert and oriented to person, place, and time             Lab Results:    Results from last 7 days   Lab Units 01/27/21  0504   WBC Thousand/uL 5 06   HEMOGLOBIN g/dL 8 3*   HEMATOCRIT % 26 5*   PLATELETS Thousands/uL 102*     Results from last 7 days   Lab Units 01/27/21  0504   POTASSIUM mmol/L 4 1   CHLORIDE mmol/L 107   CO2 mmol/L 31   BUN mg/dL 115*   CREATININE mg/dL 3 94*   CALCIUM mg/dL 9 0

## 2021-01-28 LAB
ANION GAP SERPL CALCULATED.3IONS-SCNC: 5 MMOL/L (ref 4–13)
BASOPHILS # BLD AUTO: 0.02 THOUSANDS/ΜL (ref 0–0.1)
BASOPHILS NFR BLD AUTO: 0 % (ref 0–1)
BUN SERPL-MCNC: 112 MG/DL (ref 5–25)
CALCIUM SERPL-MCNC: 8.8 MG/DL (ref 8.3–10.1)
CHLORIDE SERPL-SCNC: 103 MMOL/L (ref 100–108)
CO2 SERPL-SCNC: 32 MMOL/L (ref 21–32)
CREAT SERPL-MCNC: 4 MG/DL (ref 0.6–1.3)
EOSINOPHIL # BLD AUTO: 0.18 THOUSAND/ΜL (ref 0–0.61)
EOSINOPHIL NFR BLD AUTO: 3 % (ref 0–6)
ERYTHROCYTE [DISTWIDTH] IN BLOOD BY AUTOMATED COUNT: 15.5 % (ref 11.6–15.1)
FERRITIN SERPL-MCNC: 34 NG/ML (ref 8–388)
GFR SERPL CREATININE-BSD FRML MDRD: 13 ML/MIN/1.73SQ M
GLUCOSE SERPL-MCNC: 84 MG/DL (ref 65–140)
HCT VFR BLD AUTO: 26.2 % (ref 36.5–49.3)
HGB BLD-MCNC: 8.3 G/DL (ref 12–17)
IMM GRANULOCYTES # BLD AUTO: 0.01 THOUSAND/UL (ref 0–0.2)
IMM GRANULOCYTES NFR BLD AUTO: 0 % (ref 0–2)
INR PPP: 2.98 (ref 0.84–1.19)
IRON SATN MFR SERPL: 24 %
IRON SERPL-MCNC: 67 UG/DL (ref 65–175)
LYMPHOCYTES # BLD AUTO: 1.12 THOUSANDS/ΜL (ref 0.6–4.47)
LYMPHOCYTES NFR BLD AUTO: 20 % (ref 14–44)
MCH RBC QN AUTO: 30.2 PG (ref 26.8–34.3)
MCHC RBC AUTO-ENTMCNC: 31.7 G/DL (ref 31.4–37.4)
MCV RBC AUTO: 95 FL (ref 82–98)
MONOCYTES # BLD AUTO: 1.08 THOUSAND/ΜL (ref 0.17–1.22)
MONOCYTES NFR BLD AUTO: 20 % (ref 4–12)
NEUTROPHILS # BLD AUTO: 3.12 THOUSANDS/ΜL (ref 1.85–7.62)
NEUTS SEG NFR BLD AUTO: 57 % (ref 43–75)
NRBC BLD AUTO-RTO: 0 /100 WBCS
PLATELET # BLD AUTO: 100 THOUSANDS/UL (ref 149–390)
PMV BLD AUTO: 11.2 FL (ref 8.9–12.7)
POTASSIUM SERPL-SCNC: 3.4 MMOL/L (ref 3.5–5.3)
PROTHROMBIN TIME: 30.8 SECONDS (ref 11.6–14.5)
RBC # BLD AUTO: 2.75 MILLION/UL (ref 3.88–5.62)
SODIUM SERPL-SCNC: 140 MMOL/L (ref 136–145)
TIBC SERPL-MCNC: 279 UG/DL (ref 250–450)
WBC # BLD AUTO: 5.53 THOUSAND/UL (ref 4.31–10.16)

## 2021-01-28 PROCEDURE — 82728 ASSAY OF FERRITIN: CPT | Performed by: INTERNAL MEDICINE

## 2021-01-28 PROCEDURE — 83540 ASSAY OF IRON: CPT | Performed by: INTERNAL MEDICINE

## 2021-01-28 PROCEDURE — 80048 BASIC METABOLIC PNL TOTAL CA: CPT | Performed by: NURSE PRACTITIONER

## 2021-01-28 PROCEDURE — 83550 IRON BINDING TEST: CPT | Performed by: INTERNAL MEDICINE

## 2021-01-28 PROCEDURE — 85610 PROTHROMBIN TIME: CPT | Performed by: NURSE PRACTITIONER

## 2021-01-28 PROCEDURE — 99232 SBSQ HOSP IP/OBS MODERATE 35: CPT | Performed by: NURSE PRACTITIONER

## 2021-01-28 PROCEDURE — 85025 COMPLETE CBC W/AUTO DIFF WBC: CPT | Performed by: NURSE PRACTITIONER

## 2021-01-28 PROCEDURE — 99232 SBSQ HOSP IP/OBS MODERATE 35: CPT | Performed by: INTERNAL MEDICINE

## 2021-01-28 RX ORDER — POTASSIUM CHLORIDE 20 MEQ/1
40 TABLET, EXTENDED RELEASE ORAL ONCE
Status: COMPLETED | OUTPATIENT
Start: 2021-01-28 | End: 2021-01-28

## 2021-01-28 RX ORDER — POTASSIUM CHLORIDE 20 MEQ/1
20 TABLET, EXTENDED RELEASE ORAL 2 TIMES DAILY
Status: DISCONTINUED | OUTPATIENT
Start: 2021-01-28 | End: 2021-01-29

## 2021-01-28 RX ORDER — WARFARIN SODIUM 1 MG/1
2 TABLET ORAL
Status: DISCONTINUED | OUTPATIENT
Start: 2021-01-28 | End: 2021-01-30

## 2021-01-28 RX ORDER — LANOLIN ALCOHOL/MO/W.PET/CERES
6 CREAM (GRAM) TOPICAL
Status: DISCONTINUED | OUTPATIENT
Start: 2021-01-28 | End: 2021-02-17 | Stop reason: HOSPADM

## 2021-01-28 RX ADMIN — MELATONIN TAB 3 MG 6 MG: 3 TAB at 21:37

## 2021-01-28 RX ADMIN — POTASSIUM CHLORIDE 20 MEQ: 1500 TABLET, EXTENDED RELEASE ORAL at 17:01

## 2021-01-28 RX ADMIN — ATORVASTATIN CALCIUM 40 MG: 40 TABLET, FILM COATED ORAL at 08:31

## 2021-01-28 RX ADMIN — WARFARIN SODIUM 2 MG: 1 TABLET ORAL at 17:00

## 2021-01-28 RX ADMIN — POTASSIUM CHLORIDE 40 MEQ: 1500 TABLET, EXTENDED RELEASE ORAL at 08:31

## 2021-01-28 RX ADMIN — Medication 0.5 MG/HR: at 18:34

## 2021-01-28 RX ADMIN — METOPROLOL TARTRATE 25 MG: 25 TABLET, FILM COATED ORAL at 21:37

## 2021-01-28 NOTE — CASE MANAGEMENT
Pt is not a <30 day readmission or a bundle  Risk of unplanned readmission score is 22 (green)  Pt admitted due to CHF exacerbation  CM met with pt at bedside to introduce CM role and begin discharge planning  Pt lives with his wife, Char Ameser ([phone]) in a 1st floor, fully handicap accessible apartment that has 0 [address]  Pt reports being independent with ADLs PTA, no use of DME for ambulation  Pt reports history of [facility], no history of STR, inpatient MH treatment or D/A treatment  Pt drives and is retired  Pt's brother to assist with transportation at time of discharge  CM department to remain available for discharge concerns or needs  CM reviewed d/c planning process including the following: identifying help at home, patient preference for d/c planning needs, Discharge Lounge, [facility] Meds to Bed program, availability of treatment team to discuss questions or concerns patient and/or family may have regarding understanding medications and recognizing signs and symptoms once discharged  CM also encouraged patient to follow up with all recommended appointments after discharge  Patient advised of importance for patient and family to participate in managing patients medical well being

## 2021-01-28 NOTE — ASSESSMENT & PLAN NOTE
Lab Results   Component Value Date    EGFR 13 01/28/2021    EGFR 14 01/27/2021    EGFR 13 01/26/2021    CREATININE 4 00 (H) 01/28/2021    CREATININE 3 94 (H) 01/27/2021    CREATININE 4 19 (H) 01/26/2021   · Baseline creatinine 2-2 9 approximately  4 19 on presentation, had improved slightly but now back up to 4 00   · Hold norvasc 2/2 soft BP  · Nephrology following     · Avoid nephrotoxins and hypotension  · BMP in AM  · Known to Dr Sandy Valladares as outpatient

## 2021-01-28 NOTE — PLAN OF CARE
Problem: Potential for Falls  Goal: Patient will remain free of falls  Description: INTERVENTIONS:  - Assess patient frequently for physical needs  -  Identify cognitive and physical deficits and behaviors that affect risk of falls    -  [place] fall precautions as indicated by assessment   - Educate patient/family on patient safety including physical limitations  - Instruct patient to call for assistance with activity based on assessment  - Modify environment to reduce risk of injury  - Consider OT/PT consult to assist with strengthening/mobility  Outcome: Progressing     Problem: CARDIOVASCULAR - ADULT  Goal: Maintains optimal cardiac output and hemodynamic stability  Description: INTERVENTIONS:  - Monitor I/O, vital signs and rhythm  - Monitor for S/S and trends of decreased cardiac output  - Administer and titrate ordered vasoactive medications to optimize hemodynamic stability  - Assess quality of pulses, skin color and temperature  - Assess for signs of decreased coronary artery perfusion  - Instruct patient to report change in severity of symptoms  Outcome: Progressing     Problem: METABOLIC, FLUID AND ELECTROLYTES - ADULT  Goal: Electrolytes maintained within normal limits  Description: INTERVENTIONS:  - Monitor labs and assess patient for signs and symptoms of electrolyte imbalances  - Administer electrolyte replacement as ordered  - Monitor response to electrolyte replacements, including repeat lab results as appropriate  - Instruct patient on fluid and nutrition as appropriate  Outcome: Progressing  Goal: Fluid balance maintained  Description: INTERVENTIONS:  - Monitor labs   - Monitor I/O and WT  - Instruct patient on fluid and nutrition as appropriate  - Assess for signs & symptoms of volume excess or deficit  Outcome: Progressing     Problem: MUSCULOSKELETAL - ADULT  Goal: Maintain or return mobility to safest level of function  Description: INTERVENTIONS:  - Assess patient's ability to carry out ADLs; assess patient's baseline for ADL function and identify physical deficits which impact ability to perform ADLs (bathing, care of mouth/teeth, toileting, grooming, dressing, etc )  - Assess/evaluate cause of self-care deficits   - Assess range of motion  - Assess patient's mobility  - Assess patient's need for assistive devices and provide as appropriate  - Encourage maximum independence but intervene and supervise when necessary  - Involve family in performance of ADLs  - Assess for home care needs following discharge   - Consider OT consult to assist with ADL evaluation and planning for discharge  - Provide patient education as appropriate  Outcome: Progressing     Problem: Nutrition/Hydration-ADULT  Goal: Nutrient/Hydration intake appropriate for improving, restoring or maintaining nutritional needs  Description: Monitor and assess patient's nutrition/hydration status for malnutrition  Collaborate with interdisciplinary team and initiate plan and interventions as ordered  Monitor patient's weight and dietary intake as ordered or per policy  Utilize nutrition screening tool and intervene as necessary  Determine patient's food preferences and provide high-protein, high-caloric foods as appropriate       INTERVENTIONS:  - Monitor oral intake, urinary output, labs, and treatment plans  - Assess nutrition and hydration status and recommend course of action  - Evaluate amount of meals eaten  - Assist patient with eating if necessary   - Allow adequate time for meals  - Recommend/ encourage appropriate diets, oral nutritional supplements, and vitamin/mineral supplements  - Order, calculate, and assess calorie counts as needed  - Recommend, monitor, and adjust tube feedings and TPN/PPN based on assessed needs  - Assess need for intravenous fluids  - Provide specific nutrition/hydration education as appropriate  - Include patient/family/caregiver in decisions related to nutrition  Outcome: Progressing

## 2021-01-28 NOTE — PROGRESS NOTES
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  6  Anemia of chronic kidney disease hemoglobin currently 8 3, continue monitor closely, check iron stores  7  CKD associated mineral bone disorder, continue with renal restricted diet   8  Hypokalemia, continue with potassium replacement as needed    PLAN:  · Overall urine output has improved  · Will continue with Bumex infusion  · Unfortunately renal function not significantly improved  · Did discuss the possibility of requiring hemodialysis again, agreeable if needed  SUBJECTIVE:  Seen examined  Patient is awake alert  Offers no new complaints  Denies any chest pain pressure  Denies abdominal bloating  Denies worsening swelling    Review of Systems    OBJECTIVE:  Current Weight: Weight - Scale: 79 4 kg (175 lb 2 oz)  Vitals:    01/28/21 0323 01/28/21 0543 01/28/21 0833 01/28/21 0834   BP: 103/58  109/71    BP Location:       Pulse: (!) 53   58   Resp: 18      Temp: (!) 89 6 °F (32 °C)      TempSrc:       SpO2: 95%      Weight:  79 4 kg (175 lb 2 oz)     Height:           Intake/Output Summary (Last 24 hours) at 1/28/2021 1059  Last data filed at 1/28/2021 9015  Gross per 24 hour   Intake 1168 ml   Output 2575 ml   Net -1407 ml       Physical Exam  Constitutional:       Appearance: He is not ill-appearing     HENT:      Head: Normocephalic and atraumatic  Eyes:      General: No scleral icterus  Cardiovascular:      Rate and Rhythm: Normal rate  Pulmonary:      Effort: Pulmonary effort is normal       Breath sounds: Normal breath sounds  Abdominal:      General: There is distension  Palpations: Abdomen is soft  Musculoskeletal:      Right lower leg: No edema  Left lower leg: No edema  Skin:     General: Skin is warm and dry  Findings: No rash  Neurological:      Mental Status: He is alert and oriented to person, place, and time           Medications:    Current Facility-Administered Medications:     acetaminophen (TYLENOL) tablet 650 mg, 650 mg, Oral, Q6H PRN, EL Roth    atorvastatin (LIPITOR) tablet 40 mg, 40 mg, Oral, Daily, EL Roth, 40 mg at 01/28/21 0831    bumetanide (BUMEX) 12 5 mg infusion 50 mL, 0 5 mg/hr, Intravenous, Continuous, Amanda Siemens, PA-C, Last Rate: 2 mL/hr at 01/27/21 1439, 0 5 mg/hr at 01/27/21 1439    melatonin tablet 6 mg, 6 mg, Oral, HS, EL Roth    metoprolol tartrate (LOPRESSOR) tablet 25 mg, 25 mg, Oral, Q12H [facility], Amanda Siemens, PA-C, 25 mg at 01/27/21 2106    ondansetron (ZOFRAN) injection 4 mg, 4 mg, Intravenous, Q6H PRN, EL Roth    potassium chloride (K-DUR,KLOR-CON) CR tablet 20 mEq, 20 mEq, Oral, BID, Amanda Siemens, PA-C    Insert peripheral IV, , , Once **AND** sodium chloride (PF) 0 9 % injection 3 mL, 3 mL, Intravenous, Q1H PRN, EL Roth    warfarin (COUMADIN) tablet 2 mg, 2 mg, Oral, Daily (warfarin), EL Lambert    Laboratory Results:  Results from last 7 days   Lab Units 01/28/21  0541 01/27/21  0504 01/26/21  1418 01/26/21  0849   WBC Thousand/uL 5 53 5 06 6 04  --    HEMOGLOBIN g/dL 8 3* 8 3* 9 0*  --    HEMATOCRIT % 26 2* 26 5* 28 7*  --    PLATELETS Thousands/uL 100* 102* 113*  --    POTASSIUM mmol/L 3 4* 4 1  --  4 0   CHLORIDE mmol/L 103 107  --  106   CO2 mmol/L 32 31  -- 31   BUN mg/dL 112* 115*  --  112*   CREATININE mg/dL 4 00* 3 94*  --  4 19*   CALCIUM mg/dL 8 8 9 0  --  9 2

## 2021-01-28 NOTE — PROGRESS NOTES
[facility]    Progress Note - Oliver SEBASTIAN Core 1940, 68 y o  male MRN: [id number]    Unit/Bed#: 2 213-01 Encounter: [id number]    Primary Care Provider: EL Monte   Date and time admitted to hospital: 1/26/2021  1:24 PM        * Acute on chronic diastolic (congestive) heart failure (HCC)  Assessment & Plan  Wt Readings from Last 3 Encounters:   01/28/21 79 4 kg (175 lb 2 oz)   01/19/21 80 3 kg (177 lb)   01/11/21 78 9 kg (174 lb)     · Presents with 15 lb weight gain in one month, GONZALES, worsening lower extremity edema  Outpatient cardiology team ordered extra dose of diuretic however no improvement  Patient was noted to have AFSHIN on outpatient labs and referred to ED  · Outpatient diuretic regimen bumex 4 mg PO BID with k 20 BID and metolazone 5 mg  2 x weekly  · Heart failure following, now on IV Bumex gtt at 0 5 mg/hr  · Last echo 10/2020 showed preserved EF  Mild MR  Severe TR    · I/O, daily weights, low NA diet with FR  · Monitor volume status        Atrial fibrillation ([address] )  Assessment & Plan  · Rate controlled on Lopressor  · Anticoagulated on Coumadin  INR supratherapeutic, repeat pending today  Continue to hold coumadin for now  Stage 4 chronic kidney disease [facility])  Assessment & Plan  Lab Results   Component Value Date    EGFR 13 01/28/2021    EGFR 14 01/27/2021    EGFR 13 01/26/2021    CREATININE 4 00 (H) 01/28/2021    CREATININE 3 94 (H) 01/27/2021    CREATININE 4 19 (H) 01/26/2021   · Baseline creatinine 2-2 9 approximately  4 19 on presentation, had improved slightly but now back up to 4 00   · Hold norvasc 2/2 soft BP  · Nephrology following  · Avoid nephrotoxins and hypotension  · BMP in AM  · Known to Dr Ivy Anders as outpatient    Benign essential hypertension  Assessment & Plan  · Blood pressure on soft side  · Hold norvasc  Continue Lopressor with hold parameters     · Monitor      Dissection of thoracic aorta (HCC)  Assessment & Plan  · 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  · 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  Risk associated with surgical options therefore recommended continued observation  Hyperlipidemia  Assessment & Plan  · Continue statin    Anemia  Assessment & Plan  · Chronic, stable at baseline  Pharmacologic: Pharmacologic VTE Prophylaxis contraindicated due to supratherapuetic INR  Mechanical VTE Prophylaxis in Place: Yes    Patient Centered Rounds: I have performed bedside rounds with nursing staff today  Discussions with Specialists or Other Care Team Provider: nursing, cardiology     Education and Discussions with Family / Patient: patient and granddaughter over phone     Time Spent for Care: 20 minutes  More than 50% of total time spent on counseling and coordination of care as described above  Current Length of Stay: 2 day(s)    Current Patient Status: Inpatient   Certification Statement: The patient will continue to require additional inpatient hospital stay due to close monitoring of renal function, diuresis    Discharge Plan / Estimated Discharge Date: not stable for d/c      Code Status: Level 1 - Full Code      Subjective:   Reports breathing has improved since admission  Not SOB when ambulating  Lower extremity edema also improving  Having trouble sleeping, not related to orthopnea  Agreeable to melatonin  Objective:     Vitals:   Temp (24hrs), Av 4 °F (35 2 °C), Min:89 6 °F (32 °C), Max:97 7 °F (36 5 °C)    Temp:  [89 6 °F (32 °C)-97 7 °F (36 5 °C)] 89 6 °F (32 °C)  HR:  [53-58] 53  Resp:  [18] 18  BP: (100-115)/(56-63) 103/58  SpO2:  [95 %-96 %] 95 %  Body mass index is 27 43 kg/m²  Input and Output Summary (last 24 hours):        Intake/Output Summary (Last 24 hours) at 2021 0832  Last data filed at 2021 0807  Gross per 24 hour   Intake 1168 ml   Output 2575 ml   Net -1407 ml       Physical Exam:     Physical Exam  Vitals signs and nursing note reviewed  Cardiovascular:      Rate and Rhythm: Normal rate  Heart sounds: Murmur present  Pulmonary:      Breath sounds: Rales present  Abdominal:      Tenderness: There is no abdominal tenderness  Musculoskeletal:         General: Swelling present  Skin:     General: Skin is warm  Neurological:      Mental Status: He is alert and oriented to person, place, and time  Mental status is at baseline     Psychiatric:         Mood and Affect: Mood normal          Additional Data:     Labs:    Results from last 7 days   Lab Units 01/28/21  0541   WBC Thousand/uL 5 53   HEMOGLOBIN g/dL 8 3*   HEMATOCRIT % 26 2*   PLATELETS Thousands/uL 100*   NEUTROS PCT % 57   LYMPHS PCT % 20   MONOS PCT % 20*   EOS PCT % 3     Results from last 7 days   Lab Units 01/28/21  0541 01/27/21  0504   POTASSIUM mmol/L 3 4* 4 1   CHLORIDE mmol/L 103 107   CO2 mmol/L 32 31   BUN mg/dL 112* 115*   CREATININE mg/dL 4 00* 3 94*   CALCIUM mg/dL 8 8 9 0   ALK PHOS U/L  --  79   ALT U/L  --  20   AST U/L  --  26     Results from last 7 days   Lab Units 01/27/21  0504   INR  3 53*         Recent Cultures (last 7 days):           Last 24 Hours Medication List:   Current Facility-Administered Medications   Medication Dose Route Frequency Provider Last Rate    acetaminophen  650 mg Oral Q6H PRN EL Roth      atorvastatin  40 mg Oral Daily EL Roth      bumetanide  0 5 mg/hr Intravenous Continuous Jose Francisco Abbasi PA-C 0 5 mg/hr (01/27/21 1439)    melatonin  6 mg Oral HS EL Roth      metoprolol tartrate  25 mg Oral Q12H [facility] Fanirupali Vaughan PA-C      ondansetron  4 mg Intravenous Q6H PRN EL Roth      potassium chloride  20 mEq Oral BID Jose Francisco Abbasi PA-C      sodium chloride (PF)  3 mL Intravenous Q1H PRN EL Burgos          Today, Patient Was Seen By: EL Kapoor    ** Please Note: Dragon 360 Dictation voice to text software may have been used in the creation of this document   **

## 2021-01-28 NOTE — PROGRESS NOTES
Advanced Heart Failure / Pulmonary Hypertension Service Progress Note    Oliver Sharpe 68 y o  male   MRN: [id number]  Unit/Bed#: CW2 213-01; Encounter: [id number]    Assessment:  Principal Problem:    Acute on chronic diastolic (congestive) heart failure (HCC)  Active Problems:    Benign essential hypertension    Dissection of thoracic aorta (HCC)    Hyperlipidemia    Anemia    Stage 4 chronic kidney disease (HCC)    Atrial fibrillation (HCC)      Subjective:   Carroll Dove is a 68-year-old man with PMH as below who presented to [facility] on 01/26/2021 after being contacted by cardiology office to review abnormal renal function  Patient had increased frequency of metolazone dosing and had minimal improvement with no reported weight loss or symptomatic improvement  Case was discussed with Dr Levon Sage (per documentation) and decision was made to have patient proceed to ED for further evaluation  Patient seen and examined  No significant events overnight  Feeling much better overall  Sleeping and eating well  Denies PND and orthopnea  No longer winded with minimal exertion  Reports good urinary response to Bumex drip  Objective: Intake/ Output: 1588 mL / 2675 mL (net negative 1087 mL)  Weight: 175 lbs, standing (up from 173 lbs, bed scale, on 01/27)  Telemetry: sinus bradycardia, high 50s  Occasional PVCs  Today's Plan:   Continue on IV Bumex 0 5 mg/hour   Dry weight in 165-170 lbs range per chart review   Potassium of 3 4 this AM  Will give 40 mEq PO potassium now and start on 20 mEq BID this evening  Plan:  Acute on chronic HFpEF; LVEF 60%; LVIDd 4 69 cm; NYHA III; ACC/AHA Stage C              Etiology: Afib, HTN phenotype                TTE 04/24/2019: LVEF 60%  LVIDd 4 31 cm  IVSd 1 4 cm  Grade 1 DD  Normal RV size and RVSF  Mild TR  PASP 35 mmHg                TTE 10/06/2020: LVEF 60%  LVIDd 4 69 cm  IVSd 1 41 cm  Grade 2 DD  Normal RV size and RVSF  KUNAL  Mild MR   Severe TR                Reviewed importance of low sodium diet and fluid restriction               Strict I&Os with daily weights  CV diet with 2L fluid restriction       Neurohormonal Blockade:  --Beta Blocker: metoprolol tartrate 25 mg q12 hours  --ARNi / ACEi / ARB: No   --Aldosterone Antagonist: No   --SGLT2 Inhibitor: No   --Home Diuretic: PO Bumex 4 mg BID with PRN metolazone 5 mg and potassium 20 mEq BID  --Inpatient Diuretic: IV Bumex 0 5 mg/hour with potassium 20 mEq BID       Sudden Cardiac Death Risk Reduction:  --LVEF >35%     Electrical Resynchronization:  --Candidacy for BiV device: narrow QRS     Advanced Therapies: Will continue to monitor      Atrial fibrillation, paroxysmal              [id number] = 4 (age, HF, HTN)              Anticoagulation on Coumadin  Continue on BB as above       Chronic kidney disease, stage IV              Baseline creatinine of 2 3-2 5                Briefly on dialysis in February 2020                Today, creatinine of 4 00 (down from 3 94 on 01/27)              CHARLEY with Dr Michael Carrillo as outpatient       History of Type A aortic dissection              S/p emergent replacement of ascending aorta with hemiarch reconstruction and aortic valve resuspension with a 26 mm Dacron graft on 12/15/2019 by Dr Kendra Paige      Hypertension  Hyperlipidemia  Benign prostatic hyperplasia  History of COVID-19 infection: diagnosed in 07/2020  Vitals:   Blood pressure 109/71, pulse 58, temperature (!) 89 6 °F (32 °C), resp  rate 18, height 5' 7" (1 702 m), weight 79 4 kg (175 lb 2 oz), SpO2 95 %  Body mass index is 27 43 kg/m²  I/O last 3 completed shifts:   In: 2870 [P O :1688]  Out: 0459 [Urine:2925]    Wt Readings from Last 3 Encounters:   01/28/21 79 4 kg (175 lb 2 oz)   01/19/21 80 3 kg (177 lb)   01/11/21 78 9 kg (174 lb)     Vitals:    01/28/21 0323 01/28/21 0543 01/28/21 0833 01/28/21 0834   BP: 103/58  109/71    BP Location:       Pulse: (!) 53   58   Resp: 18      Temp: (!) 89 6 °F (32 °C)      TempSrc:       SpO2: 95%      Weight:  79 4 kg (175 lb 2 oz)     Height:           Physical Exam  Vitals signs reviewed  Constitutional:       General: He is awake  He is not in acute distress  Appearance: Normal appearance  He is well-developed and overweight  Comments: Fabric mask present   HENT:      Head: Normocephalic  Nose: Nose normal       Mouth/Throat:      Mouth: Mucous membranes are moist    Eyes:      General: No scleral icterus  Conjunctiva/sclera: Conjunctivae normal    Neck:      Musculoskeletal: Neck supple  Vascular: JVD present  Trachea: No tracheal deviation  Cardiovascular:      Rate and Rhythm: Regular rhythm  Bradycardia present  Occasional extrasystoles are present  Pulses: Normal pulses  Heart sounds: Murmur present  Pulmonary:      Effort: Pulmonary effort is normal  No tachypnea, bradypnea or respiratory distress  Breath sounds: Normal air entry  No decreased air movement  Examination of the right-lower field reveals decreased breath sounds and rales  Examination of the left-lower field reveals decreased breath sounds  Decreased breath sounds and rales present  No wheezing  Abdominal:      General: Bowel sounds are normal  There is distension  Palpations: Abdomen is soft  Tenderness: There is no abdominal tenderness  Musculoskeletal:      Right lower le+ Pitting Edema present  Left lower le+ Pitting Edema present  Skin:     General: Skin is warm and dry  Coloration: Skin is pale  Neurological:      General: No focal deficit present  Mental Status: He is alert and oriented to person, place, and time  Psychiatric:         Attention and Perception: Attention normal          Mood and Affect: Mood and affect normal          Speech: Speech normal          Behavior: Behavior normal  Behavior is cooperative  Thought Content:  Thought content normal      Central Line (day, reason): No    [place] Catheter (day, reason): No     Current Facility-Administered Medications:     acetaminophen (TYLENOL) tablet 650 mg, 650 mg, Oral, Q6H PRN, EL Roth    atorvastatin (LIPITOR) tablet 40 mg, 40 mg, Oral, Daily, EL Roth, 40 mg at 01/28/21 0831    bumetanide (BUMEX) 12 5 mg infusion 50 mL, 0 5 mg/hr, Intravenous, Continuous, Camilo Villa PA-C, Last Rate: 2 mL/hr at 01/27/21 1439, 0 5 mg/hr at 01/27/21 1439    melatonin tablet 6 mg, 6 mg, Oral, HS, EL Roth    metoprolol tartrate (LOPRESSOR) tablet 25 mg, 25 mg, Oral, Q12H [address], Camilo Villa PA-C, 25 mg at 01/27/21 2106    ondansetron (ZOFRAN) injection 4 mg, 4 mg, Intravenous, Q6H PRN, LE Roth    potassium chloride (K-DUR,KLOR-CON) CR tablet 20 mEq, 20 mEq, Oral, BID, Camilo Villa PA-C    Insert peripheral IV, , , Once **AND** sodium chloride (PF) 0 9 % injection 3 mL, 3 mL, Intravenous, Q1H PRN, EL Yi    Labs & Results:  Results from last 7 days   Lab Units 01/26/21  1418   TROPONIN I ng/mL <0 02     Results from last 7 days   Lab Units 01/28/21  0541 01/27/21  0504 01/26/21  1418   WBC Thousand/uL 5 53 5 06 6 04   HEMOGLOBIN g/dL 8 3* 8 3* 9 0*   HEMATOCRIT % 26 2* 26 5* 28 7*   PLATELETS Thousands/uL 100* 102* 113*         Results from last 7 days   Lab Units 01/28/21  0541 01/27/21  0504 01/26/21  0849   POTASSIUM mmol/L 3 4* 4 1 4 0   CHLORIDE mmol/L 103 107 106   CO2 mmol/L 32 31 31   BUN mg/dL 112* 115* 112*   CREATININE mg/dL 4 00* 3 94* 4 19*   CALCIUM mg/dL 8 8 9 0 9 2   ALK PHOS U/L  --  79  --    ALT U/L  --  20  --    AST U/L  --  26  --      Results from last 7 days   Lab Units 01/27/21  0504 01/26/21  0849   INR  3 53* 3 39*     Macarena Jay PA-C

## 2021-01-28 NOTE — ASSESSMENT & PLAN NOTE
· Rate controlled on Lopressor  · Anticoagulated on Coumadin  INR supratherapeutic, repeat pending today  Continue to hold coumadin for now

## 2021-01-28 NOTE — ASSESSMENT & PLAN NOTE
Wt Readings from Last 3 Encounters:   01/28/21 79 4 kg (175 lb 2 oz)   01/19/21 80 3 kg (177 lb)   01/11/21 78 9 kg (174 lb)     · Presents with 15 lb weight gain in one month, GONZALES, worsening lower extremity edema  Outpatient cardiology team ordered extra dose of diuretic however no improvement  Patient was noted to have AFSHIN on outpatient labs and referred to ED  · Outpatient diuretic regimen bumex 4 mg PO BID with k 20 BID and metolazone 5 mg  2 x weekly  · Heart failure following, now on IV Bumex gtt at 0 5 mg/hr  · Last echo 10/2020 showed preserved EF  Mild MR   Severe TR    · I/O, daily weights, low NA diet with FR  · Monitor volume status

## 2021-01-29 ENCOUNTER — TELEPHONE (OUTPATIENT)
Dept: NEPHROLOGY | Facility: CLINIC | Age: 81
End: 2021-01-29

## 2021-01-29 PROBLEM — G47.00 INSOMNIA: Status: ACTIVE | Noted: 2021-01-29

## 2021-01-29 LAB
ANION GAP SERPL CALCULATED.3IONS-SCNC: 8 MMOL/L (ref 4–13)
BASOPHILS # BLD AUTO: 0.01 THOUSANDS/ΜL (ref 0–0.1)
BASOPHILS NFR BLD AUTO: 0 % (ref 0–1)
BUN SERPL-MCNC: 118 MG/DL (ref 5–25)
CALCIUM SERPL-MCNC: 8.9 MG/DL (ref 8.3–10.1)
CHLORIDE SERPL-SCNC: 104 MMOL/L (ref 100–108)
CO2 SERPL-SCNC: 30 MMOL/L (ref 21–32)
CREAT SERPL-MCNC: 3.97 MG/DL (ref 0.6–1.3)
EOSINOPHIL # BLD AUTO: 0.16 THOUSAND/ΜL (ref 0–0.61)
EOSINOPHIL NFR BLD AUTO: 3 % (ref 0–6)
ERYTHROCYTE [DISTWIDTH] IN BLOOD BY AUTOMATED COUNT: 15.4 % (ref 11.6–15.1)
GFR SERPL CREATININE-BSD FRML MDRD: 14 ML/MIN/1.73SQ M
GLUCOSE SERPL-MCNC: 84 MG/DL (ref 65–140)
HBV CORE AB SER QL: NORMAL
HBV CORE IGM SER QL: NORMAL
HBV SURFACE AB SER-ACNC: <3.1 MIU/ML
HBV SURFACE AG SER QL: NORMAL
HCT VFR BLD AUTO: 26.1 % (ref 36.5–49.3)
HCV AB SER QL: NORMAL
HGB BLD-MCNC: 8.2 G/DL (ref 12–17)
IMM GRANULOCYTES # BLD AUTO: 0.02 THOUSAND/UL (ref 0–0.2)
IMM GRANULOCYTES NFR BLD AUTO: 0 % (ref 0–2)
INR PPP: 2.63 (ref 0.84–1.19)
LYMPHOCYTES # BLD AUTO: 0.92 THOUSANDS/ΜL (ref 0.6–4.47)
LYMPHOCYTES NFR BLD AUTO: 18 % (ref 14–44)
MAGNESIUM SERPL-MCNC: 2.3 MG/DL (ref 1.6–2.6)
MCH RBC QN AUTO: 29.8 PG (ref 26.8–34.3)
MCHC RBC AUTO-ENTMCNC: 31.4 G/DL (ref 31.4–37.4)
MCV RBC AUTO: 95 FL (ref 82–98)
MONOCYTES # BLD AUTO: 1.05 THOUSAND/ΜL (ref 0.17–1.22)
MONOCYTES NFR BLD AUTO: 21 % (ref 4–12)
NEUTROPHILS # BLD AUTO: 2.87 THOUSANDS/ΜL (ref 1.85–7.62)
NEUTS SEG NFR BLD AUTO: 58 % (ref 43–75)
NRBC BLD AUTO-RTO: 0 /100 WBCS
PLATELET # BLD AUTO: 99 THOUSANDS/UL (ref 149–390)
PMV BLD AUTO: 11.5 FL (ref 8.9–12.7)
POTASSIUM SERPL-SCNC: 3.3 MMOL/L (ref 3.5–5.3)
PROTHROMBIN TIME: 28 SECONDS (ref 11.6–14.5)
RBC # BLD AUTO: 2.75 MILLION/UL (ref 3.88–5.62)
SODIUM SERPL-SCNC: 142 MMOL/L (ref 136–145)
WBC # BLD AUTO: 5.03 THOUSAND/UL (ref 4.31–10.16)

## 2021-01-29 PROCEDURE — 86704 HEP B CORE ANTIBODY TOTAL: CPT | Performed by: INTERNAL MEDICINE

## 2021-01-29 PROCEDURE — 86803 HEPATITIS C AB TEST: CPT | Performed by: INTERNAL MEDICINE

## 2021-01-29 PROCEDURE — 99232 SBSQ HOSP IP/OBS MODERATE 35: CPT | Performed by: INTERNAL MEDICINE

## 2021-01-29 PROCEDURE — 83735 ASSAY OF MAGNESIUM: CPT | Performed by: PHYSICIAN ASSISTANT

## 2021-01-29 PROCEDURE — 86706 HEP B SURFACE ANTIBODY: CPT | Performed by: INTERNAL MEDICINE

## 2021-01-29 PROCEDURE — 85025 COMPLETE CBC W/AUTO DIFF WBC: CPT | Performed by: NURSE PRACTITIONER

## 2021-01-29 PROCEDURE — 99232 SBSQ HOSP IP/OBS MODERATE 35: CPT | Performed by: NURSE PRACTITIONER

## 2021-01-29 PROCEDURE — 87340 HEPATITIS B SURFACE AG IA: CPT | Performed by: INTERNAL MEDICINE

## 2021-01-29 PROCEDURE — 85610 PROTHROMBIN TIME: CPT | Performed by: NURSE PRACTITIONER

## 2021-01-29 PROCEDURE — NC001 PR NO CHARGE: Performed by: NURSE PRACTITIONER

## 2021-01-29 PROCEDURE — 86705 HEP B CORE ANTIBODY IGM: CPT | Performed by: INTERNAL MEDICINE

## 2021-01-29 PROCEDURE — 80048 BASIC METABOLIC PNL TOTAL CA: CPT | Performed by: NURSE PRACTITIONER

## 2021-01-29 RX ORDER — POTASSIUM CHLORIDE 20 MEQ/1
20 TABLET, EXTENDED RELEASE ORAL 2 TIMES DAILY
Status: DISCONTINUED | OUTPATIENT
Start: 2021-01-29 | End: 2021-02-03

## 2021-01-29 RX ORDER — TRAZODONE HYDROCHLORIDE 50 MG/1
25 TABLET ORAL
Status: DISCONTINUED | OUTPATIENT
Start: 2021-01-29 | End: 2021-01-30

## 2021-01-29 RX ORDER — POTASSIUM CHLORIDE 20 MEQ/1
60 TABLET, EXTENDED RELEASE ORAL ONCE
Status: COMPLETED | OUTPATIENT
Start: 2021-01-29 | End: 2021-01-29

## 2021-01-29 RX ADMIN — MELATONIN TAB 3 MG 6 MG: 3 TAB at 21:26

## 2021-01-29 RX ADMIN — SODIUM CHLORIDE 300 MG: 9 INJECTION, SOLUTION INTRAVENOUS at 12:17

## 2021-01-29 RX ADMIN — Medication 0.5 MG/HR: at 17:35

## 2021-01-29 RX ADMIN — METOPROLOL TARTRATE 25 MG: 25 TABLET, FILM COATED ORAL at 21:26

## 2021-01-29 RX ADMIN — WARFARIN SODIUM 2 MG: 1 TABLET ORAL at 17:38

## 2021-01-29 RX ADMIN — POTASSIUM CHLORIDE 20 MEQ: 1500 TABLET, EXTENDED RELEASE ORAL at 18:06

## 2021-01-29 RX ADMIN — ATORVASTATIN CALCIUM 40 MG: 40 TABLET, FILM COATED ORAL at 10:07

## 2021-01-29 RX ADMIN — POTASSIUM CHLORIDE 60 MEQ: 1500 TABLET, EXTENDED RELEASE ORAL at 10:08

## 2021-01-29 RX ADMIN — METOPROLOL TARTRATE 25 MG: 25 TABLET, FILM COATED ORAL at 10:08

## 2021-01-29 NOTE — ASSESSMENT & PLAN NOTE
· Rate controlled on Lopressor  · Anticoagulated on Coumadin  Initially held secondary to supratherapeutic INR  Resumed 1/28  INR 2 68 today

## 2021-01-29 NOTE — PROGRESS NOTES
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  6  Anemia of chronic kidney disease hemoglobin currently 8 2, continue monitor closely, iron stores suboptimal, ferritin less than 100, start Venofer  7  CKD associated mineral bone disorder, continue with renal restricted diet   8  Hypokalemia, continue with potassium replacement as needed     PLAN:  · Overall renal function fairly stable creatinine 3 9  · Unfortunately patient at the cusp of needing to start hemodialysis  · Discussed with daughter at length, unfortunately does not have the support at home to do peritoneal dialysis  · For now will continue with diuretic infusion weight is improving with relatively stable renal function    · Start IV iron supplementation    Addendum time 1:00 p m  Discussed with Dr Mariam Jenkins who spoke with the daughter  The daughter is now agreeable for peritoneal dialysis  Will plan for dialysis catheter placement prior to discharge  SUBJECTIVE:  Seen examined  Patient doing reasonably well  No reports of chest pain shortness of breath  Appetite seems to be stable  Denies any lower extremity swelling      Review of Systems    OBJECTIVE:  Current Weight: Weight - Scale: 77 7 kg (171 lb 4 8 oz)  Vitals:    01/29/21 0020 01/29/21 0320 01/29/21 0600 01/29/21 0755   BP: 104/56 98/56  100/57   BP Location: Left arm      Pulse:  (!) 51  (!) 53   Resp:  17  18   Temp:  98 1 °F (36 7 °C)     TempSrc:       SpO2:  96%  97%   Weight:   77 7 kg (171 lb 4 8 oz)    Height:           Intake/Output Summary (Last 24 hours) at 1/29/2021 1019  Last data filed at 1/29/2021 0759  Gross per 24 hour   Intake 1120 ml   Output 2250 ml   Net -1130 ml       Physical Exam  Constitutional:       Appearance: He is not ill-appearing  HENT:      Head: Normocephalic and atraumatic  Eyes:      General: No scleral icterus  Cardiovascular:      Rate and Rhythm: Normal rate and regular rhythm  Pulmonary:      Effort: Pulmonary effort is normal       Breath sounds: Normal breath sounds  Abdominal:      General: There is distension  Palpations: Abdomen is soft  Musculoskeletal:      Right lower leg: No edema  Left lower leg: No edema  Skin:     General: Skin is warm and dry  Findings: No rash  Neurological:      Mental Status: He is alert and oriented to person, place, and time           Medications:    Current Facility-Administered Medications:     acetaminophen (TYLENOL) tablet 650 mg, 650 mg, Oral, Q6H PRN, EL Roth    atorvastatin (LIPITOR) tablet 40 mg, 40 mg, Oral, Daily, EL Roth, 40 mg at 01/29/21 1007    bumetanide (BUMEX) 12 5 mg infusion 50 mL, 0 5 mg/hr, Intravenous, Continuous, Catrachita Bauer PA-C, Last Rate: 2 mL/hr at 01/28/21 1834, 0 5 mg/hr at 01/28/21 1834    melatonin tablet 6 mg, 6 mg, Oral, HS, EL Roth, 6 mg at 01/28/21 2137    metoprolol tartrate (LOPRESSOR) tablet 25 mg, 25 mg, Oral, Q12H [address], Fani Muñoz PA-C, 25 mg at 01/29/21 1008    ondansetron (ZOFRAN) injection 4 mg, 4 mg, Intravenous, Q6H PRN, EL Roth    potassium chloride (K-DUR,KLOR-CON) CR tablet 20 mEq, 20 mEq, Oral, BID, Catrachita Bauer PA-C    potassium chloride (K-DUR,KLOR-CON) CR tablet 60 mEq, 60 mEq, Oral, Once, Chrissy SEBASTIAN TRINY Muñoz    Insert peripheral IV, , , Once **AND** sodium chloride (PF) 0 9 % injection 3 mL, 3 mL, Intravenous, Q1H PRN, EL Roth    traZODone (DESYREL) tablet 25 mg, 25 mg, Oral, HS PRN, EL Roth    warfarin (COUMADIN) tablet 2 mg, 2 mg, Oral, Daily (warfarin), EL Roth, 2 mg at 01/28/21 1700    Laboratory Results:  Results from last 7 days   Lab Units 01/29/21  0521 01/28/21  0541 01/27/21  0504 01/26/21  1418 01/26/21  0849   WBC Thousand/uL 5 03 5 53 5 06 6 04  --    HEMOGLOBIN g/dL 8 2* 8 3* 8 3* 9 0*  --    HEMATOCRIT % 26 1* 26 2* 26 5* 28 7*  --    PLATELETS Thousands/uL 99* 100* 102* 113*  --    POTASSIUM mmol/L 3 3* 3 4* 4 1  --  4 0   CHLORIDE mmol/L 104 103 107  --  106   CO2 mmol/L 30 32 31  --  31   BUN mg/dL 118* 112* 115*  --  112*   CREATININE mg/dL 3 97* 4 00* 3 94*  --  4 19*   CALCIUM mg/dL 8 9 8 8 9 0  --  9 2

## 2021-01-29 NOTE — PLAN OF CARE
Problem: Potential for Falls  Goal: Patient will remain free of falls  Description: INTERVENTIONS:  - Assess patient frequently for physical needs  -  Identify cognitive and physical deficits and behaviors that affect risk of falls    -  L[place]ora fall precautions as indicated by assessment   - Educate patient/family on patient safety including physical limitations  - Instruct patient to call for assistance with activity based on assessment  - Modify environment to reduce risk of injury  - Consider OT/PT consult to assist with strengthening/mobility  Outcome: Progressing

## 2021-01-29 NOTE — TELEPHONE ENCOUNTER
Patient sees Isabel Saldaña  Daughter Aundra Morser who resides in [place] is concerned and has a couple of questions because she was told about him starting dialysis and she will like to discuss a few things with Dr Becker  Patient is currently admitted at the hospital  Please contact Zoey Guevara at [phone]

## 2021-01-29 NOTE — UTILIZATION REVIEW
Initial Clinical Review     Admission: Date/Time/Statement:       Admission Orders (From admission, onward)              Ordered          01/26/21 1538   Inpatient Admission  Once                             Orders Placed This Encounter   Procedures    Inpatient Admission       Standing Status:   Standing       Number of Occurrences:   1       Order Specific Question:   Level of Care       Answer:   Med Surg [16]       Order Specific Question:   Estimated length of stay       Answer:   More than 2 Midnights       Order Specific Question:   Certification       Answer:   I certify that inpatient services are medically necessary for this patient for a duration of greater than two midnights  See H&P and MD Progress Notes for additional information about the patient's course of treatment                 ED Arrival Information      Expected Arrival Acuity Means of Arrival Escorted By Service Admission Type     1/26/2021 12:03 1/26/2021 12:42 Emergent Walk-In Self Hospitalist Emergency     Arrival Complaint     chf               Chief Complaint   Patient presents with    Shortness of Breath       SOB x2 days        Assessment/Plan:         68year old male presents to ed from home for evaluation and treatment of " fluid in his lungs" and short of breath  PMHX:  CHF, CKD, HTN, CAD, AORTIC DISSECTION  Clinical assessment significant for shortness of breath, blle edema  greater than baseline, bilateral  rales, nt-bnp  4,828, chest x ray left basilar atelectasis  Ekg : sinus bradycardia  Treated in ed with iv bumex and po metoprolol  Admit to inpatient medical surgical for acute on chronic diastolic congestive heart failure  Plan includes: Iv bumex gtt  Metoprolol po  Telemetry, cardiac diet with fluid restriction, intake /output, daily weight              1-27     Continue iv bumex  Consult nephrology completed    Consult heart failure completed        Consult heart failure      Pt admitted with acute on chronic HFpEF, cardiorenal in nature  Echo's and recent outpt course reviewed  He is on high dose diuretic as outpt and he states he urinates well from his diuretics but continues to gain weight which means he is drinking more than he urinates out  Beyond IV diuresis, will stress HF teaching, consult nutrition for dietary recommendations              Consult nephrology     1  Acute kidney injury, suspecting likely cardiorenal syndrome with increasing weight, peak creatinine of 4 19 current creatinine 3 94   2  CKD stage 4 baseline creatinine mid 2s estimated GFR low 20s  3  Volume overload with a 20 lb reported weight gain   To initiate Bumex infusion  4  Diastolic heart failure with ejection fraction of 60%  5  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  6  Anemia of chronic kidney disease hemoglobin currently 8 3, continue monitor closely, check iron stores  7   CKD associated mineral bone disorder, continue with renal restricted diet     Plan:  · Patient to initiate Bumex therapy, hopefully with improved diuresis/weight, renal function will continue to improve  · Check iron stores given worsening anemia  · Monitor renal function as well as urine output closely                  ED Triage Vitals   01/26/21 1330 01/26/21 1330 01/26/21 1330 01/26/21 1330 01/26/21 1330   (!) 97 3 °F (36 3 °C) 75 20 109/60 97 %       Oral Monitor             No Pain              01/27/21 78 9 kg (173 lb 15 1 oz)      Additional Vital Signs:         Date/Time   Temp   Pulse   Resp   BP   MAP (mmHg)   SpO2   O2 Device   01/27/21 10:36:21   97 2 °F (36 2 °C)  Abnormal    --   18   115/63   80   --   --   01/27/21 0800   --   --   --   --   --   --   None (Room air)   01/27/21 07:15:10   98 1 °F (36 7 °C)   58   20   118/68   85   97 %   --   01/27/21 02:57:02   98 1 °F (36 7 °C)   57   16   101/50   67   99 %   --   01/27/21 0200   --   54Abnormal    --   --   --   97 %   --   01/27/21 0044   --   --   --   --   --   --   None (Room air)   01/27/21 00:08:28   98 1 °F (36 7 °C)   48Abnormal    16   100/60   73   98 %   None (Room air)   01/26/21 1945   --   46Abnormal    20   108/57   78   98 %   None (Room air)   01/26/21 1815   --   60   19   103/59   75   98 %   None (Room air)   01/26/21 1715   --   52Abnormal    20   126/57   81   100 %   None (Room air)   01/26/21 1615   --   52Abnormal    22   98/59   73   95 %   None (Room air)   01/26/21 1515   --   48Abnormal    15   94/53   68   95 %   None (Room air)                     Pertinent Labs/Diagnostic Test Results:             Tue Jan 26, 2021   1342 Procedure Note: EKG  Date/Time: 01/26/21 1:43 PM      Indications / Diagnosis: SOB     The EKG demonstrates:  Rate: 52  Rhythm: sinus gwen  Intervals: normal intervals  Axis: normal axis  QRS/Blocks: normal QRS  ST Changes: No acute ST Changes, no STD/CHRISTOPHER                    X-ray chest 1 view portable    (01/26 1526)       Minimal left basilar subsegmental atelectasis versus scarring with otherwise clear lungs                  Results from last 7 days   Lab Units 01/27/21  0504 01/26/21  1418   WBC Thousand/uL 5 06 6 04   HEMOGLOBIN g/dL 8 3* 9 0*   HEMATOCRIT % 26 5* 28 7*   PLATELETS Thousands/uL 102* 113*   NEUTROS ABS Thousands/µL 3 03 3 61                Results from last 7 days   Lab Units 01/27/21  0504 01/26/21  0849   SODIUM mmol/L 144 142   POTASSIUM mmol/L 4 1 4 0   CHLORIDE mmol/L 107 106   CO2 mmol/L 31 31   ANION GAP mmol/L 6 5   BUN mg/dL 115* 112*   CREATININE mg/dL 3 94* 4 19*   EGFR ml/min/1 73sq m 14 13   CALCIUM mg/dL 9 0 9 2           Results from last 7 days   Lab Units 01/27/21  0504   AST U/L 26   ALT U/L 20   ALK PHOS U/L 79   TOTAL PROTEIN g/dL 7 8   ALBUMIN g/dL 2 9*   TOTAL BILIRUBIN mg/dL 0 62               Results from last 7 days   Lab Units 01/27/21  0504   GLUCOSE RANDOM mg/dL 86              Results from last 7 days   Lab Units 01/26/21  1418   TROPONIN I ng/mL <0 02                Results from last 7 days   Lab Units 01/27/21  0504 01/26/21  0849   PROTIME seconds 35 1* 34 0*   INR   3 53* 3 39*           Results from last 7 days   Lab Units 01/26/21  0849   TSH 3RD GENERATON uIU/mL 3 610              Results from last 7 days   Lab Units 01/26/21  1418   NT-PRO BNP pg/mL 4,828*         ED Treatment:             Medication Administration from 01/26/2021 1203 to 01/26/2021 2357        Date/Time Order Dose Route Action       01/26/2021 1739 bumetanide (BUMEX) injection 2 mg 2 mg Intravenous Given              Past Medical History:   Diagnosis    Arthritis    BPH without urinary obstruction    CKD (chronic kidney disease), stage III     baseline 1 6-1 7    GERD (gastroesophageal reflux disease)    Hyperlipidemia    Hypertension    [facility]      Present on Admission:   Acute on chronic diastolic (congestive) heart failure (HCC)   Benign essential hypertension   Hyperlipidemia   Dissection of thoracic aorta (HCC)   Stage 4 chronic kidney disease (HCC)   Anemia        Admitting Diagnosis:      CHF (congestive heart failure) ([facility]) [I50 9]  Lower extremity edema [R60 0]  Dyspnea [R06 00]  AFSHIN (acute kidney injury) ([facility] ) [M36 7]  Acute diastolic CHF (congestive heart failure) ([facility]) [I50 31]     Age/Sex: 68 y o  male      Admission Orders:     atorvastatin, 40 mg, Oral, Daily  metoprolol tartrate, 25 mg, Oral, Q12H [facility]        Continuous IV Infusions:  bumetanide, 0 5 mg/hr, Intravenous, Continuous        PRN Meds:  acetaminophen, 650 mg, Oral, Q6H PRN  ondansetron, 4 mg, Intravenous, Q6H PRN  sodium chloride (PF), 3 mL, Intravenous, Q1H PRN           IP CONSULT TO HEART FAILURE SERVICE  IP CONSULT TO NUTRITION SERVICES  IP CONSULT TO NEPHROLOGY     Network Utilization Review Department  ATTENTION: Please call with any questions or concerns to [phone] and carefully listen to the prompts so that you are directed to the right person  All voicemails are confidential   Sarah Cates all requests for admission clinical reviews, approved or denied determinations and any other requests to dedicated fax number below belonging to the campus where the patient is receiving treatment   List of dedicated fax numbers for the Facilities:  [address] DENIALS (Administrative/Medical Necessity) [phone]   [address] (Maternity/NICU/Pediatrics) [phone]   [address] [place] [address] 40 [id number] [facility] Jesús Lau [address] (  Enzo Olsen [facility] [address]) [address] [place] [address] Oskar [place] [address] [place] [address] [phone]

## 2021-01-29 NOTE — ASSESSMENT & PLAN NOTE
Lab Results   Component Value Date    EGFR 14 01/29/2021    EGFR 13 01/28/2021    EGFR 14 01/27/2021    CREATININE 3 97 (H) 01/29/2021    CREATININE 4 00 (H) 01/28/2021    CREATININE 3 94 (H) 01/27/2021   · Baseline creatinine 2-2 9 approximately  4 19 on presentation, 3 94 today  · Hold norvasc 2/2 soft BP  · Nephrology following  Possibility of dialysis discussed with patient, he would be agreeable    · Avoid nephrotoxins and hypotension  · BMP in AM  · Known to Dr Yissel Mo as outpatient

## 2021-01-29 NOTE — TELEPHONE ENCOUNTER
Spoke with patient's daughter Cristy Garcia  she previously spoke with Dr Elias Diaz  we talk about his worsening kidney function and the need of renal replacement therapy in the very near future, we discussed about options including back to hemodialysis versus home therapy including PD  Patient daughters expressed understanding, she is okay with plan for PD catheter placement, further training at [address] and then eventually PD at home         I cc note to patient's PCP as well as Dr Elias Diaz to keep them updated

## 2021-01-29 NOTE — PLAN OF CARE
Problem: Potential for Falls  Goal: Patient will remain free of falls  Description: INTERVENTIONS:  - Assess patient frequently for physical needs  -  Identify cognitive and physical deficits and behaviors that affect risk of falls    -  [place] fall precautions as indicated by assessment   - Educate patient/family on patient safety including physical limitations  - Instruct patient to call for assistance with activity based on assessment  - Modify environment to reduce risk of injury  - Consider OT/PT consult to assist with strengthening/mobility  Outcome: Progressing     Problem: CARDIOVASCULAR - ADULT  Goal: Maintains optimal cardiac output and hemodynamic stability  Description: INTERVENTIONS:  - Monitor I/O, vital signs and rhythm  - Monitor for S/S and trends of decreased cardiac output  - Administer and titrate ordered vasoactive medications to optimize hemodynamic stability  - Assess quality of pulses, skin color and temperature  - Assess for signs of decreased coronary artery perfusion  - Instruct patient to report change in severity of symptoms  Outcome: Progressing     Problem: METABOLIC, FLUID AND ELECTROLYTES - ADULT  Goal: Electrolytes maintained within normal limits  Description: INTERVENTIONS:  - Monitor labs and assess patient for signs and symptoms of electrolyte imbalances  - Administer electrolyte replacement as ordered  - Monitor response to electrolyte replacements, including repeat lab results as appropriate  - Instruct patient on fluid and nutrition as appropriate  Outcome: Progressing  Goal: Fluid balance maintained  Description: INTERVENTIONS:  - Monitor labs   - Monitor I/O and WT  - Instruct patient on fluid and nutrition as appropriate  - Assess for signs & symptoms of volume excess or deficit  Outcome: Progressing     Problem: MUSCULOSKELETAL - ADULT  Goal: Maintain or return mobility to safest level of function  Description: INTERVENTIONS:  - Assess patient's ability to carry out ADLs; assess patient's baseline for ADL function and identify physical deficits which impact ability to perform ADLs (bathing, care of mouth/teeth, toileting, grooming, dressing, etc )  - Assess/evaluate cause of self-care deficits   - Assess range of motion  - Assess patient's mobility  - Assess patient's need for assistive devices and provide as appropriate  - Encourage maximum independence but intervene and supervise when necessary  - Involve family in performance of ADLs  - Assess for home care needs following discharge   - Consider OT consult to assist with ADL evaluation and planning for discharge  - Provide patient education as appropriate  Outcome: Progressing     Problem: Nutrition/Hydration-ADULT  Goal: Nutrient/Hydration intake appropriate for improving, restoring or maintaining nutritional needs  Description: Monitor and assess patient's nutrition/hydration status for malnutrition  Collaborate with interdisciplinary team and initiate plan and interventions as ordered  Monitor patient's weight and dietary intake as ordered or per policy  Utilize nutrition screening tool and intervene as necessary  Determine patient's food preferences and provide high-protein, high-caloric foods as appropriate       INTERVENTIONS:  - Monitor oral intake, urinary output, labs, and treatment plans  - Assess nutrition and hydration status and recommend course of action  - Evaluate amount of meals eaten  - Assist patient with eating if necessary   - Allow adequate time for meals  - Recommend/ encourage appropriate diets, oral nutritional supplements, and vitamin/mineral supplements  - Order, calculate, and assess calorie counts as needed  - Recommend, monitor, and adjust tube feedings and TPN/PPN based on assessed needs  - Assess need for intravenous fluids  - Provide specific nutrition/hydration education as appropriate  - Include patient/family/caregiver in decisions related to nutrition  Outcome: Progressing

## 2021-01-29 NOTE — PROGRESS NOTES
[facility] Internal Medicine    Progress Note - Oliver SEBASTIAN Core 1940, 68 y o  male MRN: [id number]    Unit/Bed#: CW2 213-01 Encounter: [id number]    Primary Care Provider: EL Bowles   Date and time admitted to hospital: 1/26/2021  1:24 PM    * Acute on chronic diastolic (congestive) heart failure (HCC)  Assessment & Plan  Wt Readings from Last 3 Encounters:   01/29/21 77 7 kg (171 lb 4 8 oz)   01/19/21 80 3 kg (177 lb)   01/11/21 78 9 kg (174 lb)     · Presents with 15 lb weight gain in one month, GONZALES, worsening lower extremity edema  Outpatient cardiology team ordered extra dose of diuretic however no improvement  Patient was noted to have AFSHIN on outpatient labs and referred to ED  · Outpatient diuretic regimen bumex 4 mg PO BID with k 20 BID and metolazone 5 mg  2 x weekly  · Heart failure following, now on IV Bumex gtt at 0 5 mg/hr  · Last echo 10/2020 showed preserved EF  Mild MR  Severe TR    · I/O, daily weights, low NA diet with FR  · Monitor volume status--improving  Atrial fibrillation ([address] )  Assessment & Plan  · Rate controlled on Lopressor  · Anticoagulated on Coumadin  Initially held secondary to supratherapeutic INR  Resumed 1/28  INR 2 68 today  Stage 4 chronic kidney disease [facility])  Assessment & Plan  Lab Results   Component Value Date    EGFR 14 01/29/2021    EGFR 13 01/28/2021    EGFR 14 01/27/2021    CREATININE 3 97 (H) 01/29/2021    CREATININE 4 00 (H) 01/28/2021    CREATININE 3 94 (H) 01/27/2021   · Baseline creatinine 2-2 9 approximately  4 19 on presentation, 3 94 today  · Hold norvasc 2/2 soft BP  · Nephrology following  Possibility of dialysis discussed with patient, he would be agreeable  · Avoid nephrotoxins and hypotension  · BMP in AM  · Known to Dr Breanna Hood as outpatient    Benign essential hypertension  Assessment & Plan  · Blood pressure on soft side  · Hold norvasc  Continue Lopressor with hold parameters     · Monitor      Dissection of thoracic aorta [facility])  Assessment & Plan  · 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  · 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  Risk associated with surgical options therefore recommended continued observation  Hyperlipidemia  Assessment & Plan  · Continue statin    Anemia  Assessment & Plan  · Chronic, stable at baseline  Insomnia  Assessment & Plan  · Continue melatonin  · Add p r n  Trazodone    Pharmacologic: Warfarin (Coumadin)     Mechanical VTE Prophylaxis in Place: Yes    Patient Centered Rounds: I have performed bedside rounds with nursing staff today  Discussions with Specialists or Other Care Team Provider: nursing    Education and Discussions with Family / Patient: patient and granddaughter over phone as well as daughter     Time Spent for Care: 30 minutes  More than 50% of total time spent on counseling and coordination of care as described above  Current Length of Stay: 3 day(s)    Current Patient Status: Inpatient   Certification Statement: The patient will continue to require additional inpatient hospital stay due to iv diuretics, close monitoring of renal functions     Discharge Plan / Estimated Discharge Date: pending cardiology/neprhology clearance  Code Status: Level 1 - Full Code      Subjective:   Patient denies any chest pain, shortness of breath  Reports that lower extremity edema continues to improve  Complains of trouble sleeping since admission to the hospital, not related to orthopnea  Objective:     Vitals:   Temp (24hrs), Av 2 °F (36 8 °C), Min:98 1 °F (36 7 °C), Max:98 4 °F (36 9 °C)    Temp:  [98 1 °F (36 7 °C)-98 4 °F (36 9 °C)] 98 1 °F (36 7 °C)  HR:  [46-62] 53  Resp:  [17-18] 18  BP: ()/(43-66) 100/57  SpO2:  [96 %-97 %] 97 %  Body mass index is 26 83 kg/m²  Input and Output Summary (last 24 hours):        Intake/Output Summary (Last 24 hours) at 1/29/2021 0855  Last data filed at 1/29/2021 0759  Gross per 24 hour   Intake 1120 ml   Output 2250 ml   Net -1130 ml       Physical Exam:     Physical Exam  Vitals signs and nursing note reviewed  Cardiovascular:      Rate and Rhythm: Bradycardia present  Heart sounds: Murmur present  Pulmonary:      Breath sounds: Decreased breath sounds present  Abdominal:      Tenderness: There is no abdominal tenderness  Musculoskeletal:         General: Swelling (improving) present  Skin:     General: Skin is warm  Neurological:      Mental Status: He is alert and oriented to person, place, and time  Mental status is at baseline  Psychiatric:         Mood and Affect: Mood normal          Additional Data:     Labs:    Results from last 7 days   Lab Units 01/28/21  0541   WBC Thousand/uL 5 53   HEMOGLOBIN g/dL 8 3*   HEMATOCRIT % 26 2*   PLATELETS Thousands/uL 100*   NEUTROS PCT % 57   LYMPHS PCT % 20   MONOS PCT % 20*   EOS PCT % 3     Results from last 7 days   Lab Units 01/29/21  0521  01/27/21  0504   POTASSIUM mmol/L 3 3*   < > 4 1   CHLORIDE mmol/L 104   < > 107   CO2 mmol/L 30   < > 31   BUN mg/dL 118*   < > 115*   CREATININE mg/dL 3 97*   < > 3 94*   CALCIUM mg/dL 8 9   < > 9 0   ALK PHOS U/L  --   --  79   ALT U/L  --   --  20   AST U/L  --   --  26    < > = values in this interval not displayed       Results from last 7 days   Lab Units 01/29/21  0521   INR  2 63*         Recent Cultures (last 7 days):           Last 24 Hours Medication List:   Current Facility-Administered Medications   Medication Dose Route Frequency Provider Last Rate    acetaminophen  650 mg Oral Q6H PRN EL Roth      atorvastatin  40 mg Oral Daily EL Roth      bumetanide  0 5 mg/hr Intravenous Continuous Boneta Carrie, PA-C 0 5 mg/hr (01/28/21 1834)    melatonin  6 mg Oral HS EL Roth      metoprolol tartrate  25 mg Oral Q12H [address] Fani Muñoz PA-C      ondansetron  4 mg Intravenous Q6H PRN EL Roth      potassium chloride  20 mEq Oral BID Rosi Thomas PA-C      sodium chloride (PF)  3 mL Intravenous Q1H PRN EL Roth      traZODone  25 mg Oral HS PRN EL Roth      warfarin  2 mg Oral Daily (warfarin) EL Marina          Today, Patient Was Seen By: EL Ro    ** Please Note: Dragon 360 Dictation voice to text software may have been used in the creation of this document   **

## 2021-01-29 NOTE — PROGRESS NOTES
Advanced Heart Failure / Pulmonary Hypertension Service Progress Note    Oliver Sharpe 68 y o  male   MRN: [id number]  Unit/Bed#: CW2 213-01; Encounter: [id number]    Assessment:  Principal Problem:    Acute on chronic diastolic (congestive) heart failure (HCC)  Active Problems:    Benign essential hypertension    Dissection of thoracic aorta (HCC)    Hyperlipidemia    Anemia    Stage 4 chronic kidney disease (HCC)    Atrial fibrillation (HCC)    Insomnia      Subjective:   Corrinne Chum is a 74-year-old man with PMH as below who presented to [facility] on 01/26/2021 after being contacted by cardiology office to review abnormal renal function  Patient had increased frequency of metolazone dosing and had minimal improvement with no reported weight loss or symptomatic improvement  Case was discussed with Dr Jose Juan Welsh (per documentation) and decision was made to have patient proceed to ED for further evaluation  Patient seen and examined  No significant events overnight  Denies any shortness of breath ambulating short distances  Reports that he has not slept more than an hour consecutively since admission  Denies PND and orthopnea  Reports good urinary response to Bumex drip  Objective: Intake/ Output: 660 mL / 2350 mL (net negative 1690 mL)  Weight: 171 lbs (down from 175 lbs on 01/28)  Telemetry: normal sinus rhythm, rates in 60s  Rates in high 40s overnight  Today's Plan:   Continue on IV Bumex 0 5 mg/hour   Dry weight in 165-170 lbs range per chart review   Potassium of 3 3 and magnesium of 2 3 this AM  Will give additional 40 mEq PO potassium this AM     Plan:  Acute on chronic HFpEF; LVEF 60%; LVIDd 4 69 cm; NYHA III; ACC/AHA Stage C              Etiology: Afib, HTN phenotype                TTE 04/24/2019: LVEF 60%  LVIDd 4 31 cm  IVSd 1 4 cm  Grade 1 DD  Normal RV size and RVSF  Mild TR  PASP 35 mmHg                TTE 10/06/2020: LVEF 60%  LVIDd 4 69 cm  IVSd 1 41 cm   Grade 2 DD  Normal RV size and RVSF  KUNAL  Mild MR  Severe TR              Reviewed importance of low sodium diet and fluid restriction               Strict I&Os with daily weights  CV diet with 2L fluid restriction       Neurohormonal Blockade:  --Beta Blocker: metoprolol tartrate 25 mg q12 hours  --ARNi / ACEi / ARB: No   --Aldosterone Antagonist: No   --SGLT2 Inhibitor: No   --Home Diuretic: PO Bumex 4 mg BID with PRN metolazone 5 mg and potassium 20 mEq BID  --Inpatient Diuretic: IV Bumex 0 5 mg/hour with potassium 20 mEq BID       Sudden Cardiac Death Risk Reduction:  --LVEF >35%     Electrical Resynchronization:  --Candidacy for BiV device: narrow QRS     Advanced Therapies: Will continue to monitor      Atrial fibrillation, paroxysmal              [id number] = 4 (age, HF, HTN)              Anticoagulation on Coumadin  Continue on BB as above       Chronic kidney disease, stage IV              Baseline creatinine of 2 3-2 5                Briefly on dialysis in February 2020                Today, creatinine of 3 97 (down from 4 00 on 01/28)              IRCBGNM with Dr Su Blanco as outpatient       History of Type A aortic dissection              S/p emergent replacement of ascending aorta with hemiarch reconstruction and aortic valve resuspension with a 26 mm Dacron graft on 12/15/2019 by Dr Narayan Marsh      Hypertension  Hyperlipidemia  Benign prostatic hyperplasia  History of COVID-19 infection: diagnosed in 07/2020  Vitals:   Blood pressure 109/61, pulse (!) 53, temperature 98 °F (36 7 °C), temperature source Oral, resp  rate 19, height 5' 7" (1 702 m), weight 77 7 kg (171 lb 4 8 oz), SpO2 97 %  Body mass index is 26 83 kg/m²  I/O last 3 completed shifts:   In: 660 [P O :660]  Out: 3700 [Urine:3700]    Wt Readings from Last 3 Encounters:   01/29/21 77 7 kg (171 lb 4 8 oz)   01/19/21 80 3 kg (177 lb)   01/11/21 78 9 kg (174 lb)     Vitals:    01/29/21 0600 01/29/21 0755 01/29/21 1046 01/29/21 1117   BP:  100/57 109/61    BP Location:       Pulse:  (!) 53     Resp:  18 19    Temp:    98 °F (36 7 °C)   TempSrc:    Oral   SpO2:  97%     Weight: 77 7 kg (171 lb 4 8 oz)      Height:           Physical Exam  Vitals signs reviewed  Constitutional:       General: He is awake  He is not in acute distress  Appearance: Normal appearance  He is well-developed and overweight  HENT:      Head: Normocephalic  Nose: Nose normal       Mouth/Throat:      Mouth: Mucous membranes are moist    Eyes:      General: No scleral icterus  Conjunctiva/sclera: Conjunctivae normal    Neck:      Musculoskeletal: Neck supple  Vascular: JVD present  Trachea: No tracheal deviation  Cardiovascular:      Rate and Rhythm: Normal rate and regular rhythm  No extrasystoles are present  Pulses: Normal pulses  Heart sounds: Murmur present  Pulmonary:      Effort: Pulmonary effort is normal  No tachypnea, bradypnea or respiratory distress  Breath sounds: Normal air entry  No decreased breath sounds, rhonchi or rales  Abdominal:      General: Bowel sounds are normal  There is distension  Palpations: Abdomen is soft  Tenderness: There is no abdominal tenderness  Musculoskeletal:      Right lower leg: Edema present  Left lower leg: Edema present  Skin:     General: Skin is warm and dry  Coloration: Skin is pale  Neurological:      General: No focal deficit present  Mental Status: He is alert and oriented to person, place, and time  Psychiatric:         Attention and Perception: Attention normal          Mood and Affect: Mood and affect normal          Speech: Speech normal          Behavior: Behavior normal  Behavior is cooperative  Thought Content:  Thought content normal      Central Line (day, reason): No    [place] Catheter (day, reason): No     Current Facility-Administered Medications:     acetaminophen (TYLENOL) tablet 650 mg, 650 mg, Oral, Q6H PRN, EL Roth    atorvastatin (LIPITOR) tablet 40 mg, 40 mg, Oral, Daily, EL Roth, 40 mg at 01/29/21 1007    bumetanide (BUMEX) 12 5 mg infusion 50 mL, 0 5 mg/hr, Intravenous, Continuous, Lily Saini PA-C, Last Rate: 2 mL/hr at 01/28/21 1834, 0 5 mg/hr at 01/28/21 1834    iron sucrose (VENOFER) 300 mg in sodium chloride 0 9 % 250 mL IVPB, 300 mg, Intravenous, Daily, Mary Torres DO    melatonin tablet 6 mg, 6 mg, Oral, HS, EL Roth, 6 mg at 01/28/21 2137    metoprolol tartrate (LOPRESSOR) tablet 25 mg, 25 mg, Oral, Q12H [address], Fani Muñoz PA-C, 25 mg at 01/29/21 1008    ondansetron (ZOFRAN) injection 4 mg, 4 mg, Intravenous, Q6H PRN, EL Roth    potassium chloride (K-DUR,KLOR-CON) CR tablet 20 mEq, 20 mEq, Oral, BID, Lily Saini PA-C    Insert peripheral IV, , , Once **AND** sodium chloride (PF) 0 9 % injection 3 mL, 3 mL, Intravenous, Q1H PRN, EL Roth    traZODone (DESYREL) tablet 25 mg, 25 mg, Oral, HS PRN, EL Roth    warfarin (COUMADIN) tablet 2 mg, 2 mg, Oral, Daily (warfarin), EL Roth, 2 mg at 01/28/21 1700    Labs & Results:  Results from last 7 days   Lab Units 01/26/21  1418   TROPONIN I ng/mL <0 02     Results from last 7 days   Lab Units 01/29/21  0521 01/28/21  0541 01/27/21  0504   WBC Thousand/uL 5 03 5 53 5 06   HEMOGLOBIN g/dL 8 2* 8 3* 8 3*   HEMATOCRIT % 26 1* 26 2* 26 5*   PLATELETS Thousands/uL 99* 100* 102*         Results from last 7 days   Lab Units 01/29/21  0521 01/28/21  0541 01/27/21  0504   POTASSIUM mmol/L 3 3* 3 4* 4 1   CHLORIDE mmol/L 104 103 107   CO2 mmol/L 30 32 31   BUN mg/dL 118* 112* 115*   CREATININE mg/dL 3 97* 4 00* 3 94*   CALCIUM mg/dL 8 9 8 8 9 0   ALK PHOS U/L  --   --  79   ALT U/L  --   --  20   AST U/L  --   --  26     Results from last 7 days   Lab Units 01/29/21  0521 01/28/21  0842 01/27/21  0504   INR  2 63* 2 98* 3 53* Rachelle Zarate PA-C

## 2021-01-29 NOTE — ASSESSMENT & PLAN NOTE
Wt Readings from Last 3 Encounters:   01/29/21 77 7 kg (171 lb 4 8 oz)   01/19/21 80 3 kg (177 lb)   01/11/21 78 9 kg (174 lb)     · Presents with 15 lb weight gain in one month, GONZALES, worsening lower extremity edema  Outpatient cardiology team ordered extra dose of diuretic however no improvement  Patient was noted to have AFSHIN on outpatient labs and referred to ED  · Outpatient diuretic regimen bumex 4 mg PO BID with k 20 BID and metolazone 5 mg  2 x weekly  · Heart failure following, now on IV Bumex gtt at 0 5 mg/hr  · Last echo 10/2020 showed preserved EF  Mild MR  Severe TR    · I/O, daily weights, low NA diet with FR  · Monitor volume status--improving

## 2021-01-30 LAB
ALBUMIN SERPL BCP-MCNC: 2.8 G/DL (ref 3.5–5)
ANION GAP SERPL CALCULATED.3IONS-SCNC: 7 MMOL/L (ref 4–13)
BUN SERPL-MCNC: 112 MG/DL (ref 5–25)
CALCIUM ALBUM COR SERPL-MCNC: 10.1 MG/DL (ref 8.3–10.1)
CALCIUM SERPL-MCNC: 9.1 MG/DL (ref 8.3–10.1)
CHLORIDE SERPL-SCNC: 102 MMOL/L (ref 100–108)
CO2 SERPL-SCNC: 31 MMOL/L (ref 21–32)
CREAT SERPL-MCNC: 3.73 MG/DL (ref 0.6–1.3)
GFR SERPL CREATININE-BSD FRML MDRD: 15 ML/MIN/1.73SQ M
GLUCOSE SERPL-MCNC: 81 MG/DL (ref 65–140)
INR PPP: 2.33 (ref 0.84–1.19)
PHOSPHATE SERPL-MCNC: 5.4 MG/DL (ref 2.3–4.1)
POTASSIUM SERPL-SCNC: 3.3 MMOL/L (ref 3.5–5.3)
PROTHROMBIN TIME: 25.4 SECONDS (ref 11.6–14.5)
SODIUM SERPL-SCNC: 140 MMOL/L (ref 136–145)

## 2021-01-30 PROCEDURE — 99232 SBSQ HOSP IP/OBS MODERATE 35: CPT | Performed by: INTERNAL MEDICINE

## 2021-01-30 PROCEDURE — 85610 PROTHROMBIN TIME: CPT | Performed by: NURSE PRACTITIONER

## 2021-01-30 PROCEDURE — 99232 SBSQ HOSP IP/OBS MODERATE 35: CPT | Performed by: NURSE PRACTITIONER

## 2021-01-30 PROCEDURE — 80069 RENAL FUNCTION PANEL: CPT | Performed by: INTERNAL MEDICINE

## 2021-01-30 PROCEDURE — 99233 SBSQ HOSP IP/OBS HIGH 50: CPT | Performed by: INTERNAL MEDICINE

## 2021-01-30 RX ORDER — SEVELAMER HYDROCHLORIDE 800 MG/1
800 TABLET, FILM COATED ORAL
Status: DISCONTINUED | OUTPATIENT
Start: 2021-01-30 | End: 2021-02-16

## 2021-01-30 RX ORDER — POTASSIUM CHLORIDE 20 MEQ/1
40 TABLET, EXTENDED RELEASE ORAL ONCE
Status: COMPLETED | OUTPATIENT
Start: 2021-01-30 | End: 2021-01-30

## 2021-01-30 RX ORDER — TRAZODONE HYDROCHLORIDE 50 MG/1
25 TABLET ORAL
Status: DISCONTINUED | OUTPATIENT
Start: 2021-01-30 | End: 2021-02-17 | Stop reason: HOSPADM

## 2021-01-30 RX ADMIN — Medication 12.5 MG: at 21:10

## 2021-01-30 RX ADMIN — TRAZODONE HYDROCHLORIDE 25 MG: 50 TABLET ORAL at 21:11

## 2021-01-30 RX ADMIN — POTASSIUM CHLORIDE 40 MEQ: 1500 TABLET, EXTENDED RELEASE ORAL at 11:00

## 2021-01-30 RX ADMIN — MELATONIN TAB 3 MG 6 MG: 3 TAB at 21:09

## 2021-01-30 RX ADMIN — SEVELAMER HYDROCHLORIDE 800 MG: 800 TABLET, FILM COATED PARENTERAL at 12:18

## 2021-01-30 RX ADMIN — POTASSIUM CHLORIDE 20 MEQ: 1500 TABLET, EXTENDED RELEASE ORAL at 17:27

## 2021-01-30 RX ADMIN — ATORVASTATIN CALCIUM 40 MG: 40 TABLET, FILM COATED ORAL at 08:51

## 2021-01-30 RX ADMIN — Medication 0.5 MG/HR: at 13:10

## 2021-01-30 RX ADMIN — SODIUM CHLORIDE 300 MG: 9 INJECTION, SOLUTION INTRAVENOUS at 10:56

## 2021-01-30 RX ADMIN — SEVELAMER HYDROCHLORIDE 800 MG: 800 TABLET, FILM COATED PARENTERAL at 17:27

## 2021-01-30 RX ADMIN — POTASSIUM CHLORIDE 20 MEQ: 1500 TABLET, EXTENDED RELEASE ORAL at 08:51

## 2021-01-30 NOTE — ASSESSMENT & PLAN NOTE
Lab Results   Component Value Date    EGFR 15 01/30/2021    EGFR 14 01/29/2021    EGFR 13 01/28/2021    CREATININE 3 73 (H) 01/30/2021    CREATININE 3 97 (H) 01/29/2021    CREATININE 4 00 (H) 01/28/2021   · Baseline creatinine 2-2 9 approximately  4 19 on presentation, 3 73 today  · Hold norvasc 2/2 soft BP  · Nephrology following  Planning for PD  Catheter to be  Inserted tentatively Tuesday in IR  Discussed with Dr Elias Diaz, tentative plan would then be for outpatient f/u and PD education classes and then initiation in about 2 weeks     · Avoid nephrotoxins and hypotension  · BMP in AM  · Known to Dr Sandy Valladares as outpatient

## 2021-01-30 NOTE — PROGRESS NOTES
NEPHROLOGY PROGRESS NOTE   Andrea Ballard [facility] 68 y o  male MRN: [id number]  Unit/Bed#: CW2 213-01 Encounter: [id number]  Reason for Consult: AFSHIN    Assessment/Plan:  1  Acute kidney injury, suspecting likely cardiorenal syndrome with increasing weight, peak creatinine of 4 19 current creatinine 3 94   History of requiring hemodialysis  2  CKD stage 4 baseline creatinine mid 2s estimated GFR low 20s  3  Volume overload with a 20 lb reported weight gain, estimated dry weight 160 lb,  -  continue with Bumex infusion  4  Diastolic heart failure with ejection fraction of 60%  5  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  6  Anemia of chronic kidney disease hemoglobin currently 8 2, continue monitor closely, iron stores suboptimal, ferritin less than 100, start Venofer  7  CKD associated mineral bone disorder, continue with renal restricted diet   8  Hypokalemia, continue with potassium replacement as needed     PLAN:  · Overall renal function appears fairly stable creatinine of 3 7  · Replace potassium as needed  · Start phosphate binders  · Given marginal renal function, would plan for PD catheter placement prior to discharge  Discussed with Interventional Radiology  · Continue with Bumex infusion for the next 24 hours    SUBJECTIVE:  Seen and examined  Patient awake alert  Offers no new complaints  Denies any chest pain or shortness of breath    Denies in abdominal pain    Review of Systems    OBJECTIVE:  Current Weight: Weight - Scale: 77 2 kg (170 lb 3 1 oz)  Vitals:    01/30/21 0525 01/30/21 0538 01/30/21 0732 01/30/21 0850   BP: 116/67  113/66    BP Location:       Pulse: (!) 47  (!) 51 (!) 48   Resp: 17  17    Temp: 97 7 °F (36 5 °C)  97 9 °F (36 6 °C)    TempSrc:       SpO2: 96%  95%    Weight:  77 2 kg (170 lb 3 1 oz)     Height:           Intake/Output Summary (Last 24 hours) at 1/30/2021 0853  Last data filed at 1/30/2021 9477  Gross per 24 hour   Intake 120 ml   Output 1050 ml   Net -930 ml       Physical Exam  Constitutional:       Appearance: Normal appearance  Eyes:      General: No scleral icterus  Cardiovascular:      Rate and Rhythm: Normal rate and regular rhythm  Pulmonary:      Effort: Pulmonary effort is normal       Breath sounds: Examination of the right-lower field reveals rales  Rales present  Abdominal:      General: There is no distension  Palpations: Abdomen is soft  Tenderness: There is no guarding  Musculoskeletal:      Right lower leg: No edema  Left lower leg: No edema  Skin:     General: Skin is warm and dry  Neurological:      Mental Status: He is alert and oriented to person, place, and time           Medications:    Current Facility-Administered Medications:     acetaminophen (TYLENOL) tablet 650 mg, 650 mg, Oral, Q6H PRN, EL Roth    atorvastatin (LIPITOR) tablet 40 mg, 40 mg, Oral, Daily, EL Roth, 40 mg at 01/30/21 0851    bumetanide (BUMEX) 12 5 mg infusion 50 mL, 0 5 mg/hr, Intravenous, Continuous, Paralee Agent, PA-C, Last Rate: 2 mL/hr at 01/29/21 1735, 0 5 mg/hr at 01/29/21 1735    iron sucrose (VENOFER) 300 mg in sodium chloride 0 9 % 250 mL IVPB, 300 mg, Intravenous, Daily, Isaak Torres DO, Stopped at 01/29/21 1550    melatonin tablet 6 mg, 6 mg, Oral, HS, EL Roth, 6 mg at 01/29/21 2126    metoprolol tartrate (LOPRESSOR) tablet 25 mg, 25 mg, Oral, Q12H [facility], Paralee Agent, PA-C, 25 mg at 01/29/21 2126    ondansetron (ZOFRAN) injection 4 mg, 4 mg, Intravenous, Q6H PRN, EL Roth    potassium chloride (K-DUR,KLOR-CON) CR tablet 20 mEq, 20 mEq, Oral, BID, Paralee Agent, PA-C, 20 mEq at 01/30/21 0851    Insert peripheral IV, , , Once **AND** sodium chloride (PF) 0 9 % injection 3 mL, 3 mL, Intravenous, Q1H PRN, Char M Britni, CRNP    traZODone (DESYREL) tablet 25 mg, 25 mg, Oral, HS PRN, Freya Cuello EL Ryder    warfarin (COUMADIN) tablet 2 mg, 2 mg, Oral, Daily (warfarin), Char DE ANDA EL Ryder, 2 mg at 01/29/21 5058    Laboratory Results:  Results from last 7 days   Lab Units 01/30/21  0527 01/29/21  0521 01/28/21  0541 01/27/21  0504 01/26/21  1418 01/26/21  0849   WBC Thousand/uL  --  5 03 5 53 5 06 6 04  --    HEMOGLOBIN g/dL  --  8 2* 8 3* 8 3* 9 0*  --    HEMATOCRIT %  --  26 1* 26 2* 26 5* 28 7*  --    PLATELETS Thousands/uL  --  99* 100* 102* 113*  --    POTASSIUM mmol/L 3 3* 3 3* 3 4* 4 1  --  4 0   CHLORIDE mmol/L 102 104 103 107  --  106   CO2 mmol/L 31 30 32 31  --  31   BUN mg/dL 112* 118* 112* 115*  --  112*   CREATININE mg/dL 3 73* 3 97* 4 00* 3 94*  --  4 19*   CALCIUM mg/dL 9 1 8 9 8 8 9 0  --  9 2   MAGNESIUM mg/dL  --  2 3  --   --   --   --    PHOSPHORUS mg/dL 5 4*  --   --   --   --   --

## 2021-01-30 NOTE — PROGRESS NOTES
[facility]    Progress Note - Oliver SEBASTIAN Core 1940, 68 y o  male MRN: [id number]    Unit/Bed#: CW2 213-01 Encounter: [id number]    Primary Care Provider: EL Riojas   Date and time admitted to hospital: 1/26/2021  1:24 PM    * Acute on chronic diastolic (congestive) heart failure (HCC)  Assessment & Plan  Wt Readings from Last 3 Encounters:   01/30/21 77 2 kg (170 lb 3 1 oz)   01/19/21 80 3 kg (177 lb)   01/11/21 78 9 kg (174 lb)     · Presents with 15 lb weight gain in one month, GONZALES, worsening lower extremity edema  Outpatient cardiology team ordered extra dose of diuretic however no improvement  Patient was noted to have AFSHIN on outpatient labs and referred to ED  · Outpatient diuretic regimen bumex 4 mg PO BID with k 20 BID and metolazone 5 mg  2 x weekly  · Heart failure following, now on IV Bumex gtt at 0 5 mg/hr  · Last echo 10/2020 showed preserved EF  Mild MR  Severe TR    · I/O, daily weights, low NA diet with FR  · Monitor volume status--improving  Atrial fibrillation ([address] )  Assessment & Plan  · Rate controlled on Lopressor  · Anticoagulated on Coumadin  Initially held secondary to supratherapeutic INR  Resumed 1/28 but now on hold again for PD catheter placement  Stage 4 chronic kidney disease [facility])  Assessment & Plan  Lab Results   Component Value Date    EGFR 15 01/30/2021    EGFR 14 01/29/2021    EGFR 13 01/28/2021    CREATININE 3 73 (H) 01/30/2021    CREATININE 3 97 (H) 01/29/2021    CREATININE 4 00 (H) 01/28/2021   · Baseline creatinine 2-2 9 approximately  4 19 on presentation, 3 73 today  · Hold norvasc 2/2 soft BP  · Nephrology following  Planning for PD  Catheter to be  Inserted tentatively Tuesday in IR  Discussed with Dr Niyah Loco, tentative plan would then be for outpatient f/u and PD education classes and then initiation in about 2 weeks     · Avoid nephrotoxins and hypotension  · BMP in AM  · Known to Dr Tamar Mccall as outpatient    Benign essential hypertension  Assessment & Plan  · Blood pressure on soft side  · Hold norvasc  Continue Lopressor with hold parameters  · Monitor      Dissection of thoracic aorta [facility])  Assessment & Plan  · 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  · 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  Risk associated with surgical options therefore recommended continued observation  Hyperlipidemia  Assessment & Plan  · Continue statin    Anemia  Assessment & Plan  · Chronic, stable at baseline  · IV iron per nephro    Insomnia  Assessment & Plan  · Continue melatonin  ·   Continue p r n  Trazodone    Pharmacologic: Warfarin (Coumadin)  Mechanical VTE Prophylaxis in Place: Yes    Patient Centered Rounds: I have performed bedside rounds with nursing staff today  Discussions with Specialists or Other Care Team Provider:  Nursing, Nephrology    Education and Discussions with Family / Patient:  Patient and daughter over phone    Time Spent for Care: 20 minutes  More than 50% of total time spent on counseling and coordination of care as described above  Current Length of Stay: 4 day(s)    Current Patient Status: Inpatient   Certification Statement: The patient will continue to require additional inpatient hospital stay due to Intravenous Bumex through weekend, PD catheter placement on Tuesday    Discharge Plan / Estimated Discharge Date:  Suspect early/mid next week      Code Status: Level 1 - Full Code      Subjective:   Patient offers no acute complaints  No SOB/cp  Lower extremity edema improved/stable  Agreeable to PD  Objective:     Vitals:   Temp (24hrs), Av 7 °F (36 5 °C), Min:96 9 °F (36 1 °C), Max:98 °F (36 7 °C)    Temp:  [96 9 °F (36 1 °C)-98 °F (36 7 °C)] 97 9 °F (36 6 °C)  HR:  [47-53] 48  Resp:  [17-19] 17  BP: ()/() 113/66  SpO2:  [95 %-98 %] 95 %  Body mass index is 26 66 kg/m²  Input and Output Summary (last 24 hours): Intake/Output Summary (Last 24 hours) at 1/30/2021 1031  Last data filed at 1/30/2021 0900  Gross per 24 hour   Intake 120 ml   Output 1050 ml   Net -930 ml       Physical Exam:     Physical Exam  Vitals signs and nursing note reviewed  Constitutional:       General: He is not in acute distress  Cardiovascular:      Rate and Rhythm: Bradycardia present  Heart sounds: Murmur present  Pulmonary:      Breath sounds: Decreased breath sounds present  Abdominal:      Tenderness: There is no abdominal tenderness  Musculoskeletal:         General: Swelling present  Skin:     General: Skin is warm  Neurological:      Mental Status: He is alert and oriented to person, place, and time  Mental status is at baseline  Psychiatric:         Mood and Affect: Mood normal          Additional Data:     Labs:    Results from last 7 days   Lab Units 01/29/21  0521   WBC Thousand/uL 5 03   HEMOGLOBIN g/dL 8 2*   HEMATOCRIT % 26 1*   PLATELETS Thousands/uL 99*   NEUTROS PCT % 58   LYMPHS PCT % 18   MONOS PCT % 21*   EOS PCT % 3     Results from last 7 days   Lab Units 01/30/21  0527  01/27/21  0504   POTASSIUM mmol/L 3 3*   < > 4 1   CHLORIDE mmol/L 102   < > 107   CO2 mmol/L 31   < > 31   BUN mg/dL 112*   < > 115*   CREATININE mg/dL 3 73*   < > 3 94*   CALCIUM mg/dL 9 1   < > 9 0   ALK PHOS U/L  --   --  79   ALT U/L  --   --  20   AST U/L  --   --  26    < > = values in this interval not displayed       Results from last 7 days   Lab Units 01/30/21  0527   INR  2 33*         Recent Cultures (last 7 days):           Last 24 Hours Medication List:   Current Facility-Administered Medications   Medication Dose Route Frequency Provider Last Rate    acetaminophen  650 mg Oral Q6H PRN EL Roth      atorvastatin  40 mg Oral Daily EL Roth      bumetanide  0 5 mg/hr Intravenous Continuous Tigist Collazo PA-C 0 5 mg/hr (01/29/21 4934)    iron sucrose  300 mg Intravenous Daily Toma Torres DO Stopped (01/29/21 1550)    melatonin  6 mg Oral HS EL Roth      metoprolol tartrate  25 mg Oral Q12H [address] Alok Mooney PA-C      ondansetron  4 mg Intravenous Q6H PRN EL Roth      potassium chloride  20 mEq Oral BID Alok Mooney PA-C      potassium chloride  40 mEq Oral Once Toma Torres DO      sevelamer  800 mg Oral TID With Meals Toma Torres DO      sodium chloride (PF)  3 mL Intravenous Q1H PRN EL Roth      traZODone  25 mg Oral HS EL Bass          Today, Patient Was Seen By: EL Kapoor    ** Please Note: Dragon 360 Dictation voice to text software may have been used in the creation of this document   **

## 2021-01-30 NOTE — ASSESSMENT & PLAN NOTE
· Rate controlled on Lopressor  · Anticoagulated on Coumadin  Initially held secondary to supratherapeutic INR  Resumed 1/28 but now on hold again for PD catheter placement

## 2021-01-30 NOTE — PLAN OF CARE
Problem: Potential for Falls  Goal: Patient will remain free of falls  Description: INTERVENTIONS:  - Assess patient frequently for physical needs  -  Identify cognitive and physical deficits and behaviors that affect risk of falls    -  [place] fall precautions as indicated by assessment   - Educate patient/family on patient safety including physical limitations  - Instruct patient to call for assistance with activity based on assessment  - Modify environment to reduce risk of injury  - Consider OT/PT consult to assist with strengthening/mobility  Outcome: Progressing     Problem: CARDIOVASCULAR - ADULT  Goal: Maintains optimal cardiac output and hemodynamic stability  Description: INTERVENTIONS:  - Monitor I/O, vital signs and rhythm  - Monitor for S/S and trends of decreased cardiac output  - Administer and titrate ordered vasoactive medications to optimize hemodynamic stability  - Assess quality of pulses, skin color and temperature  - Assess for signs of decreased coronary artery perfusion  - Instruct patient to report change in severity of symptoms  Outcome: Progressing     Problem: METABOLIC, FLUID AND ELECTROLYTES - ADULT  Goal: Electrolytes maintained within normal limits  Description: INTERVENTIONS:  - Monitor labs and assess patient for signs and symptoms of electrolyte imbalances  - Administer electrolyte replacement as ordered  - Monitor response to electrolyte replacements, including repeat lab results as appropriate  - Instruct patient on fluid and nutrition as appropriate  Outcome: Progressing  Goal: Fluid balance maintained  Description: INTERVENTIONS:  - Monitor labs   - Monitor I/O and WT  - Instruct patient on fluid and nutrition as appropriate  - Assess for signs & symptoms of volume excess or deficit  Outcome: Progressing     Problem: MUSCULOSKELETAL - ADULT  Goal: Maintain or return mobility to safest level of function  Description: INTERVENTIONS:  - Assess patient's ability to carry out ADLs; assess patient's baseline for ADL function and identify physical deficits which impact ability to perform ADLs (bathing, care of mouth/teeth, toileting, grooming, dressing, etc )  - Assess/evaluate cause of self-care deficits   - Assess range of motion  - Assess patient's mobility  - Assess patient's need for assistive devices and provide as appropriate  - Encourage maximum independence but intervene and supervise when necessary  - Involve family in performance of ADLs  - Assess for home care needs following discharge   - Consider OT consult to assist with ADL evaluation and planning for discharge  - Provide patient education as appropriate  Outcome: Progressing     Problem: Nutrition/Hydration-ADULT  Goal: Nutrient/Hydration intake appropriate for improving, restoring or maintaining nutritional needs  Description: Monitor and assess patient's nutrition/hydration status for malnutrition  Collaborate with interdisciplinary team and initiate plan and interventions as ordered  Monitor patient's weight and dietary intake as ordered or per policy  Utilize nutrition screening tool and intervene as necessary  Determine patient's food preferences and provide high-protein, high-caloric foods as appropriate       INTERVENTIONS:  - Monitor oral intake, urinary output, labs, and treatment plans  - Assess nutrition and hydration status and recommend course of action  - Evaluate amount of meals eaten  - Assist patient with eating if necessary   - Allow adequate time for meals  - Recommend/ encourage appropriate diets, oral nutritional supplements, and vitamin/mineral supplements  - Order, calculate, and assess calorie counts as needed  - Recommend, monitor, and adjust tube feedings and TPN/PPN based on assessed needs  - Assess need for intravenous fluids  - Provide specific nutrition/hydration education as appropriate  - Include patient/family/caregiver in decisions related to nutrition  Outcome: Progressing

## 2021-01-30 NOTE — ASSESSMENT & PLAN NOTE
Wt Readings from Last 3 Encounters:   01/30/21 77 2 kg (170 lb 3 1 oz)   01/19/21 80 3 kg (177 lb)   01/11/21 78 9 kg (174 lb)     · Presents with 15 lb weight gain in one month, GONZALES, worsening lower extremity edema  Outpatient cardiology team ordered extra dose of diuretic however no improvement  Patient was noted to have AFSHIN on outpatient labs and referred to ED  · Outpatient diuretic regimen bumex 4 mg PO BID with k 20 BID and metolazone 5 mg  2 x weekly  · Heart failure following, now on IV Bumex gtt at 0 5 mg/hr  · Last echo 10/2020 showed preserved EF  Mild MR  Severe TR    · I/O, daily weights, low NA diet with FR  · Monitor volume status--improving

## 2021-01-30 NOTE — PROGRESS NOTES
Heart Failure/ Pulmonary Hypertension Progress Note - Oliver Sharpe 68 y o  male MRN: [id number]    Unit/Bed#: CW2 213-01 Encounter: [id number]      Assessment:    Principal Problem:    Acute on chronic diastolic (congestive) heart failure (HCC)  Active Problems:    Benign essential hypertension    Dissection of thoracic aorta (HCC)    Hyperlipidemia    Anemia    Stage 4 chronic kidney disease (HCC)    Atrial fibrillation (HCC)    Insomnia      # Acute on Chronic HFpEF, Stage C  Etiology: cardiorenal  Outpt: diuretic:  Bumex 4 mg BID w/ prn metolazone and potassium 20 meq BID  Inpt: Bumex gtt 0 5 mg/hr  Weight: 175 standing  NT proBNP:     Studies- personally reviewed by me  TTE 10/06/2020: LVEF 60%  LVIDd 4 69 cm  IVSd 1 41 cm  Grade 2 DD  Normal RV size and RVSF  KUNAL  Mild MR  Severe TR  TTE 04/24/2019: LVEF 60%  LVIDd 4 31 cm  IVSd 1 4 cm  Grade 1 DD  Normal RV size and RVSF  Mild TR  PASP 35 mmHg       Atrial fibrillation, paroxysmal   [id number] = 4 (age, HF, HTN)   Anticoagulation on Coumadin       Chronic kidney disease, stage IV  Baseline creatinine of 2 3-2 5  Briefly on dialysis in February 2020       History of Type A aortic dissection  S/p emergent replacement of ascending aorta with hemiarch reconstruction and aortic valve resuspension with a 26 mm Dacron graft on 12/15/2019 by Dr Daniella Kaur      Hypertension  Hyperlipidemia  Benign prostatic hyperplasia  History of COVID-19 infection: diagnosed in 07/2020       Plans:  Continue bumex gtt toward euvolemia, ultimately will likely need HD  Nephrology planning on PD catheter placement prior to discharge  Creatinine overall stable  Continue AC with coumadin, decrease metoprolol to 12 5mg BID due to bradycardia      Subjective:   Patient seen and examined  No significant events overnight  Feeling overall better  No chest pain or shortness of breath  Remains on Bumex 0 5mg/hr      Objective:    Intake/ Output: 700/1000, -300  Weight: 170 lbs  Tele: sinus bradycardia      Vitals: Blood pressure 115/66, pulse 56, temperature 97 9 °F (36 6 °C), resp  rate 18, height 5' 7" (1 702 m), weight 77 2 kg (170 lb 3 1 oz), SpO2 96 %  , Body mass index is 26 66 kg/m² , I/O last 3 completed shifts: In: 940 [P O :940]  Out: 2150 [Urine:2150]  I/O this shift:  In: 148 8 [I V :148 8]  Out: 575 [Urine:575]  Wt Readings from Last 3 Encounters:   01/30/21 77 2 kg (170 lb 3 1 oz)   01/19/21 80 3 kg (177 lb)   01/11/21 78 9 kg (174 lb)       Intake/Output Summary (Last 24 hours) at 1/30/2021 1406  Last data filed at 1/30/2021 1308  Gross per 24 hour   Intake 268 8 ml   Output 1175 ml   Net -906 2 ml     I/O last 3 completed shifts: In: 940 [P O :940]  Out: 2150 [Urine:2150]    No significant arrhythmias seen on telemetry review         Physical Exam:  Vitals:    01/30/21 0538 01/30/21 0732 01/30/21 0850 01/30/21 1134   BP:  113/66  115/66   Pulse:  (!) 51 (!) 48 56   Resp:  17  18   Temp:  97 9 °F (36 6 °C)     TempSrc:       SpO2:  95%  96%   Weight: 77 2 kg (170 lb 3 1 oz)      Height:           GEN: Oliver SEBASTIAN Core appears well, alert and oriented x 3, pleasant and cooperative   HEENT: NC/AT, moist mucosa, anicteric sclerae; extraocular muscles intact  NECK: supple, no carotid bruits   HEART: regular rhythm, normal S1 and S2, no murmurs, clicks, gallops or rubs, JVP is  elevated  LUNGS: clear to auscultation bilaterally; no wheezes, rales, or rhonchi   ABDOMEN: normal bowel sounds, soft, no tenderness, no distention  EXTREMITIES: peripheral pulses normal; no clubbing, cyanosis, or edema  NEURO: no focal findings   SKIN: normal without suspicious lesions on exposed skin      Current Facility-Administered Medications:     acetaminophen (TYLENOL) tablet 650 mg, 650 mg, Oral, Q6H PRN, EL Roth    atorvastatin (LIPITOR) tablet 40 mg, 40 mg, Oral, Daily, EL Roth, 40 mg at 01/30/21 0851    bumetanide (BUMEX) 12 5 mg infusion 50 mL, 0 5 mg/hr, Intravenous, Continuous, Pecolia Lion, PA-C, Last Rate: 2 mL/hr at 01/30/21 1310, 0 5 mg/hr at 01/30/21 1310    iron sucrose (VENOFER) 300 mg in sodium chloride 0 9 % 250 mL IVPB, 300 mg, Intravenous, Daily, Rhunette Ella Torres DO, Last Rate: 0 mL/hr at 01/29/21 1550, 300 mg at 01/30/21 1056    melatonin tablet 6 mg, 6 mg, Oral, HS, EL Roth, 6 mg at 01/29/21 2126    metoprolol tartrate (LOPRESSOR) tablet 25 mg, 25 mg, Oral, Q12H [facility], Trista Storm PA-C, 25 mg at 01/29/21 2126    ondansetron (ZOFRAN) injection 4 mg, 4 mg, Intravenous, Q6H PRN, EL Roth    potassium chloride (K-DUR,KLOR-CON) CR tablet 20 mEq, 20 mEq, Oral, BID, Trista Storm PA-C, 20 mEq at 01/30/21 4755    sevelamer (RENAGEL) tablet 800 mg, 800 mg, Oral, TID With Meals, Leonila Torres DO, 800 mg at 01/30/21 1218    Insert peripheral IV, , , Once **AND** sodium chloride (PF) 0 9 % injection 3 mL, 3 mL, Intravenous, Q1H PRN, EL Roth    traZODone (DESYREL) tablet 25 mg, 25 mg, Oral, HS, EL Roth      Labs & Results:    Results from last 7 days   Lab Units 01/26/21  1418   TROPONIN I ng/mL <0 02     Results from last 7 days   Lab Units 01/29/21  0521 01/28/21  0541 01/27/21  0504   WBC Thousand/uL 5 03 5 53 5 06   HEMOGLOBIN g/dL 8 2* 8 3* 8 3*   HEMATOCRIT % 26 1* 26 2* 26 5*   PLATELETS Thousands/uL 99* 100* 102*         Results from last 7 days   Lab Units 01/30/21  0527 01/29/21  0521 01/28/21  0541 01/27/21  0504   POTASSIUM mmol/L 3 3* 3 3* 3 4* 4 1   CHLORIDE mmol/L 102 104 103 107   CO2 mmol/L 31 30 32 31   BUN mg/dL 112* 118* 112* 115*   CREATININE mg/dL 3 73* 3 97* 4 00* 3 94*   CALCIUM mg/dL 9 1 8 9 8 8 9 0   ALK PHOS U/L  --   --   --  79   ALT U/L  --   --   --  20   AST U/L  --   --   --  26     Results from last 7 days   Lab Units 01/30/21  0527 01/29/21  0521 01/28/21  0842   INR  2 33* 2 63* 2 98*         Robson Kwan MD  Advanced Heart Failure and Mechanical Shun

## 2021-01-30 NOTE — TELEMEDICINE
INTERPROFESSIONAL (PHONE) [id number] [facility] 68 y o  male MRN: [id number]  Unit/Bed#: CW2 213-01 Encounter: [id number]    IR has been consulted to evaluate the patient, determine the appropriate procedure, and whether or not a procedure can and should be performed regarding the care of [facility]  We were consulted by nephrology concerning CKD4/AFSHIN, and to possibly perform a PD catheter insertion if medically appropriate for the patient  IP Consult to IR  Consult performed by: EL Dave  Consult ordered by: Sara Mendoza DO        01/29/21      Assessment/Recommendation:     68year old male with AFSHIN, likely in the setting of cardiorenal syndrome with CKD stage 4  Patient will likely require urgent start dialysis in the next few weeks  Patient and daughter agreeable to PD start  Plan for PD catheter insertion early next week by Dr Taisha Jiménez  - plan for PD catheter insertion 2/2  - NPO after MN  - INR must be less than 1 5 for PD catheter insertion, will discuss with primary team about holding versus brigding with heparin gtt      Total time spent in review of data, discussion with requesting provider and rendering advice was 20 minutes  Patient or appropriate family member was verbally informed by nephrology of this consultative service on their behalf to provide more timely access to specialty care in lieu of an in person consultation  They were informed it is a billable service unless the it was determined an in person follow up was medically necessary by me within the next 14 days at which time only the in person consult would be billed  Verbal consent was obtained  Thank you for allowing Interventional Radiology to participate in the care of [address]  Please don't hesitate to call or TigerText us with any questions       [address] [place] [address]

## 2021-01-31 LAB
ANION GAP SERPL CALCULATED.3IONS-SCNC: 7 MMOL/L (ref 4–13)
BUN SERPL-MCNC: 122 MG/DL (ref 5–25)
CALCIUM SERPL-MCNC: 9.5 MG/DL (ref 8.3–10.1)
CHLORIDE SERPL-SCNC: 104 MMOL/L (ref 100–108)
CO2 SERPL-SCNC: 31 MMOL/L (ref 21–32)
CREAT SERPL-MCNC: 3.79 MG/DL (ref 0.6–1.3)
GFR SERPL CREATININE-BSD FRML MDRD: 14 ML/MIN/1.73SQ M
GLUCOSE SERPL-MCNC: 96 MG/DL (ref 65–140)
POTASSIUM SERPL-SCNC: 3.5 MMOL/L (ref 3.5–5.3)
SODIUM SERPL-SCNC: 142 MMOL/L (ref 136–145)

## 2021-01-31 PROCEDURE — 99232 SBSQ HOSP IP/OBS MODERATE 35: CPT | Performed by: INTERNAL MEDICINE

## 2021-01-31 PROCEDURE — 99232 SBSQ HOSP IP/OBS MODERATE 35: CPT | Performed by: NURSE PRACTITIONER

## 2021-01-31 PROCEDURE — 80048 BASIC METABOLIC PNL TOTAL CA: CPT | Performed by: INTERNAL MEDICINE

## 2021-01-31 PROCEDURE — 99231 SBSQ HOSP IP/OBS SF/LOW 25: CPT | Performed by: INTERNAL MEDICINE

## 2021-01-31 RX ADMIN — SEVELAMER HYDROCHLORIDE 800 MG: 800 TABLET, FILM COATED PARENTERAL at 09:23

## 2021-01-31 RX ADMIN — TRAZODONE HYDROCHLORIDE 25 MG: 50 TABLET ORAL at 21:39

## 2021-01-31 RX ADMIN — POTASSIUM CHLORIDE 20 MEQ: 1500 TABLET, EXTENDED RELEASE ORAL at 09:23

## 2021-01-31 RX ADMIN — Medication 12.5 MG: at 09:23

## 2021-01-31 RX ADMIN — SEVELAMER HYDROCHLORIDE 800 MG: 800 TABLET, FILM COATED PARENTERAL at 16:53

## 2021-01-31 RX ADMIN — Medication 0.5 MG/HR: at 11:39

## 2021-01-31 RX ADMIN — MELATONIN TAB 3 MG 6 MG: 3 TAB at 21:39

## 2021-01-31 RX ADMIN — SEVELAMER HYDROCHLORIDE 800 MG: 800 TABLET, FILM COATED PARENTERAL at 11:11

## 2021-01-31 RX ADMIN — SODIUM CHLORIDE 300 MG: 9 INJECTION, SOLUTION INTRAVENOUS at 09:27

## 2021-01-31 RX ADMIN — Medication 12.5 MG: at 21:39

## 2021-01-31 RX ADMIN — POTASSIUM CHLORIDE 20 MEQ: 1500 TABLET, EXTENDED RELEASE ORAL at 19:23

## 2021-01-31 RX ADMIN — ATORVASTATIN CALCIUM 40 MG: 40 TABLET, FILM COATED ORAL at 09:28

## 2021-01-31 NOTE — ASSESSMENT & PLAN NOTE
Lab Results   Component Value Date    EGFR 14 01/31/2021    EGFR 15 01/30/2021    EGFR 14 01/29/2021    CREATININE 3 79 (H) 01/31/2021    CREATININE 3 73 (H) 01/30/2021    CREATININE 3 97 (H) 01/29/2021   · Baseline creatinine 2-2 9 approximately  4 19 on presentation, 3 79 today  · Hold norvasc 2/2 soft BP  · Nephrology following  Planning for PD  Catheter to be  Inserted tentatively Tuesday in IR  Discussed with Dr Nicole Dumont, tentative plan would then be for outpatient f/u and PD education classes and then initiation in about 2 weeks     · Avoid nephrotoxins and hypotension  · BMP in AM  · Known to Dr Charles Ace as outpatient

## 2021-01-31 NOTE — ASSESSMENT & PLAN NOTE
Wt Readings from Last 3 Encounters:   01/31/21 76 6 kg (168 lb 14 oz)   01/19/21 80 3 kg (177 lb)   01/11/21 78 9 kg (174 lb)     · Presents with 15 lb weight gain in one month, GONZALES, worsening lower extremity edema  Outpatient cardiology team ordered extra dose of diuretic however no improvement  Patient was noted to have AFSHIN on outpatient labs and referred to ED  · Outpatient diuretic regimen bumex 4 mg PO BID with k 20 BID and metolazone 5 mg  2 x weekly  · Heart failure following, now on IV Bumex gtt at 0 5 mg/hr  · Last echo 10/2020 showed preserved EF  Mild MR  Severe TR    · I/O, daily weights, low NA diet with FR  · Monitor volume status--improving

## 2021-01-31 NOTE — PROGRESS NOTES
[facility]    Progress Note - Oliver SEBASTIAN Core 1940, 68 y o  male MRN: [id number]    Unit/Bed#: CW2 213-01 Encounter: [id number]    Primary Care Provider: EL Long   Date and time admitted to hospital: 1/26/2021  1:24 PM        * Acute on chronic diastolic (congestive) heart failure (HCC)  Assessment & Plan  Wt Readings from Last 3 Encounters:   01/31/21 76 6 kg (168 lb 14 oz)   01/19/21 80 3 kg (177 lb)   01/11/21 78 9 kg (174 lb)     · Presents with 15 lb weight gain in one month, GONZALES, worsening lower extremity edema  Outpatient cardiology team ordered extra dose of diuretic however no improvement  Patient was noted to have AFSHIN on outpatient labs and referred to ED  · Outpatient diuretic regimen bumex 4 mg PO BID with k 20 BID and metolazone 5 mg  2 x weekly  · Heart failure following, now on IV Bumex gtt at 0 5 mg/hr  · Last echo 10/2020 showed preserved EF  Mild MR  Severe TR    · I/O, daily weights, low NA diet with FR  · Monitor volume status--improving  Atrial fibrillation ([address] )  Assessment & Plan  · Rate controlled on Lopressor  · Anticoagulated on Coumadin  Initially held secondary to supratherapeutic INR  Resumed 1/28 but now on hold again for PD catheter placement  Stage 4 chronic kidney disease [facility])  Assessment & Plan  Lab Results   Component Value Date    EGFR 14 01/31/2021    EGFR 15 01/30/2021    EGFR 14 01/29/2021    CREATININE 3 79 (H) 01/31/2021    CREATININE 3 73 (H) 01/30/2021    CREATININE 3 97 (H) 01/29/2021   · Baseline creatinine 2-2 9 approximately  4 19 on presentation, 3 79 today  · Hold norvasc 2/2 soft BP  · Nephrology following  Planning for PD  Catheter to be  Inserted tentatively Tuesday in IR  Discussed with Dr Helene De La Garza, tentative plan would then be for outpatient f/u and PD education classes and then initiation in about 2 weeks     · Avoid nephrotoxins and hypotension  · BMP in AM  · Known to Dr Diomedes Chamberlain as outpatient    Benign essential hypertension  Assessment & Plan  · Blood pressure on soft side  · Hold norvasc  Continue Lopressor with hold parameters, decreased to 12 5 mg BID  · Monitor      Dissection of thoracic aorta [facility])  Assessment & Plan  · 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  · 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  Risk associated with surgical options therefore recommended continued observation  Hyperlipidemia  Assessment & Plan  · Continue statin    Anemia  Assessment & Plan  · Chronic, stable at baseline  · IV iron per nephro    Insomnia  Assessment & Plan  · Continue melatonin  · Continue Trazodone HS    Pharmacologic: Pharmacologic VTE Prophylaxis contraindicated due to holding coumadin  Mechanical VTE Prophylaxis in Place: Yes    Patient Centered Rounds: I have performed bedside rounds with nursing staff today  Discussions with Specialists or Other Care Team Provider: nursing, case management    Education and Discussions with Family / Patient: patient and daughter over phone     Time Spent for Care: 30 minutes  More than 50% of total time spent on counseling and coordination of care as described above  Current Length of Stay: 5 day(s)    Current Patient Status: Inpatient   Certification Statement: The patient will continue to require additional inpatient hospital stay due to diuresis, PD cath placement, additional cardiology/nephro recommendations    Discharge Plan / Estimated Discharge Date: not medically stable  Code Status: Level 1 - Full Code      Subjective:   Patient offers no acute complaints  Slept better last night  Lower extremity edema continues to improve  No shortness of breath  Anxious to leave the hospital, worried about his wife who is currently  Discussed with daughter, she is attempting to make arrangements to come to [place] from [facility]        Objective:     Vitals:   Temp (24hrs), Av 4 °F (36 3 °C), Min:96 °F (35 6 °C), Max:98 1 °F (36 7 °C)    Temp:  [96 °F (35 6 °C)-98 1 °F (36 7 °C)] 98 1 °F (36 7 °C)  HR:  [48-58] 48  Resp:  [17-18] 18  BP: ()/(50-66) 98/58  SpO2:  [89 %-96 %] 96 %  Body mass index is 26 45 kg/m²  Input and Output Summary (last 24 hours): Intake/Output Summary (Last 24 hours) at 2021 0852  Last data filed at 2021 2300  Gross per 24 hour   Intake 638 8 ml   Output 1505 ml   Net -866 2 ml       Physical Exam:     Physical Exam  Vitals signs and nursing note reviewed  Cardiovascular:      Rate and Rhythm: Bradycardia present  Heart sounds: Murmur present  Pulmonary:      Breath sounds: Decreased breath sounds present  Abdominal:      Tenderness: There is no abdominal tenderness  Musculoskeletal:         General: Swelling (Improved) present  Skin:     General: Skin is warm  Neurological:      Mental Status: He is alert and oriented to person, place, and time  Mental status is at baseline  Psychiatric:         Mood and Affect: Mood normal          Additional Data:     Labs:    Results from last 7 days   Lab Units 21  0521   WBC Thousand/uL 5 03   HEMOGLOBIN g/dL 8 2*   HEMATOCRIT % 26 1*   PLATELETS Thousands/uL 99*   NEUTROS PCT % 58   LYMPHS PCT % 18   MONOS PCT % 21*   EOS PCT % 3     Results from last 7 days   Lab Units 21  0444  21  0504   POTASSIUM mmol/L 3 5   < > 4 1   CHLORIDE mmol/L 104   < > 107   CO2 mmol/L 31   < > 31   BUN mg/dL 122*   < > 115*   CREATININE mg/dL 3 79*   < > 3 94*   CALCIUM mg/dL 9 5   < > 9 0   ALK PHOS U/L  --   --  79   ALT U/L  --   --  20   AST U/L  --   --  26    < > = values in this interval not displayed       Results from last 7 days   Lab Units 21  0527   INR  2 33*         Recent Cultures (last 7 days):           Last 24 Hours Medication List:   Current Facility-Administered Medications   Medication Dose Route Frequency Provider Last Rate    acetaminophen 650 mg Oral Q6H PRN EL Roth      atorvastatin  40 mg Oral Daily EL Roth      bumetanide  0 5 mg/hr Intravenous Continuous Norm SARA ChoC 0 5 mg/hr (01/30/21 1310)    iron sucrose  300 mg Intravenous Daily Gabriela Marcie Torres, DO Stopped (01/30/21 1330)    melatonin  6 mg Oral HS EL Roth      metoprolol tartrate  12 5 mg Oral Q12H [address], MD      ondansetron  4 mg Intravenous Q6H PRN EL Roth      potassium chloride  20 mEq Oral BID Norm TRINY Cho      sevelamer  800 mg Oral TID With Meals Gabriela Marcie Torres, DO      sodium chloride (PF)  3 mL Intravenous Q1H PRN EL Roth      traZODone  25 mg Oral HS EL Solano          Today, Patient Was Seen By: EL Burnham    ** Please Note: Dragon 360 Dictation voice to text software may have been used in the creation of this document   **

## 2021-01-31 NOTE — PROGRESS NOTES
NEPHROLOGY PROGRESS NOTE   Amrita SEBASTIAN [facility] 68 y o  male MRN: [id number]  Unit/Bed#: CW2 213-01 Encounter: [id number]  Reason for Consult:  Acute kidney injury    Assessment/Plan:  1  Acute kidney injury, suspecting likely cardiorenal syndrome with increasing weight, peak creatinine of 4 19 current creatinine 3 94   History of requiring hemodialysis  2  CKD stage 4 baseline creatinine mid 2s estimated GFR low 20s  3  Volume overload with a 20 lb reported weight gain, estimated dry weight 160 lb,  -  continue with Bumex infusion  4  Diastolic heart failure with ejection fraction of 60%  5  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  6  Anemia of chronic kidney disease hemoglobin currently 8 2, continue monitor closely, iron stores suboptimal, ferritin less than 100, start Venofer  7  CKD associated mineral bone disorder, continue with renal restricted diet   8  Hypokalemia, continue with potassium replacement as needed    9  CKD associated mineral bone disorder, continue with phosphate binders    PLAN:  · Overall renal function appears fairly stable  · Weight is slowly improving  · Continue Bumex drip for 1 more day  · Would go ahead proceed with PD catheter placement next week as patient will likely need to initiate dialysis within the next 2-4 weeks    SUBJECTIVE:  Seen examined  Patient awake alert  Offers no new complaints  Denies any chest pain shortness of or abdominal pain  Appetite is stable      Review of Systems    OBJECTIVE:  Current Weight: Weight - Scale: 76 6 kg (168 lb 14 oz)  Vitals:    01/30/21 2212 01/31/21 0248 01/31/21 0551 01/31/21 0704   BP: 97/52 97/50  98/58   BP Location: Right arm Right arm     Pulse: (!) 50 (!) 49  (!) 48   Resp: 18 18  18   Temp: (!) 96 °F (35 6 °C)   98 1 °F (36 7 °C)   TempSrc: Oral   Oral   SpO2: (!) 89% 95%  96%   Weight:   76 6 kg (168 lb 14 oz)    Height:           Intake/Output Summary (Last 24 hours) at 1/31/2021 0916  Last data filed at 1/30/2021 2300  Gross per 24 hour   Intake 638 8 ml   Output 1505 ml   Net -866 2 ml       Physical Exam  Constitutional:       Appearance: He is not ill-appearing  HENT:      Head: Normocephalic and atraumatic  Eyes:      General: No scleral icterus  Cardiovascular:      Rate and Rhythm: Normal rate and regular rhythm  Pulmonary:      Effort: Pulmonary effort is normal       Breath sounds: Normal breath sounds  Abdominal:      General: There is distension  Palpations: Abdomen is soft  Tenderness: There is no guarding  Musculoskeletal:      Right lower leg: No edema  Left lower leg: No edema  Skin:     General: Skin is warm and dry  Findings: No rash  Neurological:      Mental Status: He is alert and oriented to person, place, and time           Medications:    Current Facility-Administered Medications:     acetaminophen (TYLENOL) tablet 650 mg, 650 mg, Oral, Q6H PRN, EL Roth    atorvastatin (LIPITOR) tablet 40 mg, 40 mg, Oral, Daily, EL Roth, 40 mg at 01/30/21 0851    bumetanide (BUMEX) 12 5 mg infusion 50 mL, 0 5 mg/hr, Intravenous, Continuous, Debria Place, PA-C, Last Rate: 2 mL/hr at 01/30/21 1310, 0 5 mg/hr at 01/30/21 1310    iron sucrose (VENOFER) 300 mg in sodium chloride 0 9 % 250 mL IVPB, 300 mg, Intravenous, Daily, [facility], Stopped at 01/30/21 1330    melatonin tablet 6 mg, 6 mg, Oral, HS, EL Roth, 6 mg at 01/30/21 2109    metoprolol tartrate (LOPRESSOR) partial tablet 12 5 mg, 12 5 mg, Oral, Q12H [address], Cash Hdez MD, 12 5 mg at 01/30/21 2110    ondansetron (ZOFRAN) injection 4 mg, 4 mg, Intravenous, Q6H PRN, EL Roth    potassium chloride (K-DUR,KLOR-CON) CR tablet 20 mEq, 20 mEq, Oral, BID, Debria Place, PA-C, 20 mEq at 01/30/21 1727    sevelamer (RENAGEL) tablet 800 mg, 800 mg, Oral, TID With Meals, Zenon Almeida, DO, 800 mg at 01/30/21 1727    Insert peripheral IV, , , Once **AND** sodium chloride (PF) 0 9 % injection 3 mL, 3 mL, Intravenous, Q1H PRN, EL Roth    traZODone (DESYREL) tablet 25 mg, 25 mg, Oral, HS, EL Roth, 25 mg at 01/30/21 2111    Laboratory Results:  Results from last 7 days   Lab Units 01/31/21  0444 01/30/21  0527 01/29/21  0521 01/28/21  0541 01/27/21  0504 01/26/21  1418 01/26/21  0849   WBC Thousand/uL  --   --  5 03 5 53 5 06 6 04  --    HEMOGLOBIN g/dL  --   --  8 2* 8 3* 8 3* 9 0*  --    HEMATOCRIT %  --   --  26 1* 26 2* 26 5* 28 7*  --    PLATELETS Thousands/uL  --   --  99* 100* 102* 113*  --    POTASSIUM mmol/L 3 5 3 3* 3 3* 3 4* 4 1  --  4 0   CHLORIDE mmol/L 104 102 104 103 107  --  106   CO2 mmol/L 31 31 30 32 31  --  31   BUN mg/dL 122* 112* 118* 112* 115*  --  112*   CREATININE mg/dL 3 79* 3 73* 3 97* 4 00* 3 94*  --  4 19*   CALCIUM mg/dL 9 5 9 1 8 9 8 8 9 0  --  9 2   MAGNESIUM mg/dL  --   --  2 3  --   --   --   --    PHOSPHORUS mg/dL  --  5 4*  --   --   --   --   --

## 2021-01-31 NOTE — PROGRESS NOTES
Heart Failure/ Pulmonary Hypertension Progress Note - Oliver Sharpe 68 y o  male MRN: [id number]    Unit/Bed#: CW2 213-01 Encounter: [id number]      Assessment:    Principal Problem:    Acute on chronic diastolic (congestive) heart failure (HCC)  Active Problems:    Benign essential hypertension    Dissection of thoracic aorta (HCC)    Hyperlipidemia    Anemia    Stage 4 chronic kidney disease (HCC)    Atrial fibrillation (HCC)    Insomnia      # Acute on Chronic HFpEF, Stage C  Etiology: cardiorenal  Outpt: diuretic:  Bumex 4 mg BID w/ prn metolazone and potassium 20 meq BID  Inpt: Bumex gtt 0 5 mg/hr  Weight: 175 standing  NT proBNP:     Studies- personally reviewed by me  TTE 10/06/2020: LVEF 60%  LVIDd 4 69 cm  IVSd 1 41 cm  Grade 2 DD  Normal RV size and RVSF  KUNAL  Mild MR  Severe TR  TTE 04/24/2019: LVEF 60%  LVIDd 4 31 cm  IVSd 1 4 cm  Grade 1 DD  Normal RV size and RVSF  Mild TR  PASP 35 mmHg       Atrial fibrillation, paroxysmal   [id number] = 4 (age, HF, HTN)   Anticoagulation on Coumadin       Chronic kidney disease, stage IV  Baseline creatinine of 2 3-2 5  Briefly on dialysis in February 2020       History of Type A aortic dissection  S/p emergent replacement of ascending aorta with hemiarch reconstruction and aortic valve resuspension with a 26 mm Dacron graft on 12/15/2019 by Dr Shane Byers      Hypertension  Hyperlipidemia  Benign prostatic hyperplasia  History of COVID-19 infection: diagnosed in 07/2020       Plans:  Continue bumex drip, diuresing with stable renal function  Nephrology planning on PD catheter placement prior to discharge  Continue AC with coumadin  Continue decreased dose of metoprolol      Subjective:   Patient seen and examined  No significant events overnight  No chest pain or shortness of breath  Remains on Bumex 0 5mg/hr      Objective:    Intake/ Output: 638/1755, -1116  Weight: 168 lbs  Tele: sinus bradycardia      Vitals: Blood pressure 100/57, pulse 58, temperature 98 2 °F (36 8 °C), temperature source Oral, resp  rate 18, height 5' 7" (1 702 m), weight 76 6 kg (168 lb 14 oz), SpO2 94 %  , Body mass index is 26 45 kg/m² , I/O last 3 completed shifts: In: 638 8 [P O :240; I V :148 8; IV Piggyback:250]  Out: 2155 [Urine:2155]  I/O this shift: In: 420 [P O :420]  Out: 1050 [Urine:1050]  Wt Readings from Last 3 Encounters:   01/31/21 76 6 kg (168 lb 14 oz)   01/19/21 80 3 kg (177 lb)   01/11/21 78 9 kg (174 lb)       Intake/Output Summary (Last 24 hours) at 1/31/2021 1402  Last data filed at 1/31/2021 1323  Gross per 24 hour   Intake 660 ml   Output 2230 ml   Net -1570 ml     I/O last 3 completed shifts: In: 638 8 [P O :240; I V :148 8; IV Piggyback:250]  Out: 2155 [Urine:2155]    No significant arrhythmias seen on telemetry review         Physical Exam:  Vitals:    01/31/21 0551 01/31/21 0704 01/31/21 0923 01/31/21 1046   BP:  98/58  100/57   BP Location:       Pulse:  (!) 48 (!) 50 58   Resp:  18  18   Temp:  98 1 °F (36 7 °C)  98 2 °F (36 8 °C)   TempSrc:  Oral  Oral   SpO2:  96%  94%   Weight: 76 6 kg (168 lb 14 oz)      Height:           GEN: Oliver SEBASTIAN Core appears well, alert and oriented x 3, pleasant and cooperative   HEENT: NC/AT, moist mucosa, anicteric sclerae; extraocular muscles intact  NECK: supple, no carotid bruits   HEART: regular rhythm, normal S1 and S2, no murmurs, clicks, gallops or rubs, JVP is  elevated  LUNGS: clear to auscultation bilaterally; no wheezes, rales, or rhonchi   ABDOMEN: normal bowel sounds, soft, no tenderness, no distention  EXTREMITIES: peripheral pulses normal; no clubbing, cyanosis, or edema  NEURO: no focal findings   SKIN: normal without suspicious lesions on exposed skin      Current Facility-Administered Medications:     acetaminophen (TYLENOL) tablet 650 mg, 650 mg, Oral, Q6H PRN, EL Roth    atorvastatin (LIPITOR) tablet 40 mg, 40 mg, Oral, Daily, EL Roth, 40 mg at 01/31/21 0928    bumetanide (BUMEX) 12 5 mg infusion 50 mL, 0 5 mg/hr, Intravenous, Continuous, Paralee Agent, PA-C, Last Rate: 2 mL/hr at 01/31/21 1139, 0 5 mg/hr at 01/31/21 1139    melatonin tablet 6 mg, 6 mg, Oral, HS, EL Roth, 6 mg at 01/30/21 2109    metoprolol tartrate (LOPRESSOR) partial tablet 12 5 mg, 12 5 mg, Oral, Q12H [address], Naomie Fischer MD, 12 5 mg at 01/31/21 1048    ondansetron (ZOFRAN) injection 4 mg, 4 mg, Intravenous, Q6H PRN, EL Roth    potassium chloride (K-DUR,KLOR-CON) CR tablet 20 mEq, 20 mEq, Oral, BID, Paralee Agent, PA-C, 20 mEq at 01/31/21 1469    sevelamer (RENAGEL) tablet 800 mg, 800 mg, Oral, TID With Meals, Isaak Torres DO, 800 mg at 01/31/21 1111    Insert peripheral IV, , , Once **AND** sodium chloride (PF) 0 9 % injection 3 mL, 3 mL, Intravenous, Q1H PRN, EL Roth    traZODone (DESYREL) tablet 25 mg, 25 mg, Oral, HS, EL Roth, 25 mg at 01/30/21 2111      Labs & Results:    Results from last 7 days   Lab Units 01/26/21  1418   TROPONIN I ng/mL <0 02     Results from last 7 days   Lab Units 01/29/21  0521 01/28/21  0541 01/27/21  0504   WBC Thousand/uL 5 03 5 53 5 06   HEMOGLOBIN g/dL 8 2* 8 3* 8 3*   HEMATOCRIT % 26 1* 26 2* 26 5*   PLATELETS Thousands/uL 99* 100* 102*         Results from last 7 days   Lab Units 01/31/21  0444 01/30/21  0527 01/29/21  0521  01/27/21  0504   POTASSIUM mmol/L 3 5 3 3* 3 3*   < > 4 1   CHLORIDE mmol/L 104 102 104   < > 107   CO2 mmol/L 31 31 30   < > 31   BUN mg/dL 122* 112* 118*   < > 115*   CREATININE mg/dL 3 79* 3 73* 3 97*   < > 3 94*   CALCIUM mg/dL 9 5 9 1 8 9   < > 9 0   ALK PHOS U/L  --   --   --   --  79   ALT U/L  --   --   --   --  20   AST U/L  --   --   --   --  26    < > = values in this interval not displayed       Results from last 7 days   Lab Units 01/30/21  0527 01/29/21  0521 01/28/21  0842   INR  2 33* 2 63* 2 98*         Shanique Bianchi MD  Advanced Heart Failure and Mechanical Circulatory [facility]

## 2021-01-31 NOTE — ASSESSMENT & PLAN NOTE
· Blood pressure on soft side  · Hold norvasc  Continue Lopressor with hold parameters, decreased to 12 5 mg BID    · Monitor

## 2021-02-01 DIAGNOSIS — I50.20 SYSTOLIC CONGESTIVE HEART FAILURE, UNSPECIFIED HF CHRONICITY (HCC): ICD-10-CM

## 2021-02-01 DIAGNOSIS — I50.32 CHRONIC HEART FAILURE WITH PRESERVED EJECTION FRACTION (HCC): ICD-10-CM

## 2021-02-01 LAB
ANION GAP SERPL CALCULATED.3IONS-SCNC: 6 MMOL/L (ref 4–13)
BUN SERPL-MCNC: 119 MG/DL (ref 5–25)
CALCIUM SERPL-MCNC: 9.3 MG/DL (ref 8.3–10.1)
CHLORIDE SERPL-SCNC: 106 MMOL/L (ref 100–108)
CO2 SERPL-SCNC: 31 MMOL/L (ref 21–32)
CREAT SERPL-MCNC: 3.91 MG/DL (ref 0.6–1.3)
GFR SERPL CREATININE-BSD FRML MDRD: 14 ML/MIN/1.73SQ M
GLUCOSE SERPL-MCNC: 89 MG/DL (ref 65–140)
POTASSIUM SERPL-SCNC: 4 MMOL/L (ref 3.5–5.3)
SODIUM SERPL-SCNC: 143 MMOL/L (ref 136–145)

## 2021-02-01 PROCEDURE — 99232 SBSQ HOSP IP/OBS MODERATE 35: CPT | Performed by: INTERNAL MEDICINE

## 2021-02-01 PROCEDURE — 99232 SBSQ HOSP IP/OBS MODERATE 35: CPT | Performed by: NURSE PRACTITIONER

## 2021-02-01 PROCEDURE — 80048 BASIC METABOLIC PNL TOTAL CA: CPT | Performed by: NURSE PRACTITIONER

## 2021-02-01 RX ORDER — BUMETANIDE 2 MG/1
4 TABLET ORAL
Status: DISCONTINUED | OUTPATIENT
Start: 2021-02-01 | End: 2021-02-02

## 2021-02-01 RX ADMIN — SEVELAMER HYDROCHLORIDE 800 MG: 800 TABLET, FILM COATED PARENTERAL at 12:06

## 2021-02-01 RX ADMIN — MELATONIN TAB 3 MG 6 MG: 3 TAB at 22:23

## 2021-02-01 RX ADMIN — POTASSIUM CHLORIDE 20 MEQ: 1500 TABLET, EXTENDED RELEASE ORAL at 10:24

## 2021-02-01 RX ADMIN — TRAZODONE HYDROCHLORIDE 25 MG: 50 TABLET ORAL at 22:23

## 2021-02-01 RX ADMIN — BUMETANIDE 4 MG: 2 TABLET ORAL at 18:17

## 2021-02-01 RX ADMIN — Medication 12.5 MG: at 22:24

## 2021-02-01 RX ADMIN — Medication 12.5 MG: at 10:28

## 2021-02-01 RX ADMIN — ATORVASTATIN CALCIUM 40 MG: 40 TABLET, FILM COATED ORAL at 10:24

## 2021-02-01 RX ADMIN — POTASSIUM CHLORIDE 20 MEQ: 1500 TABLET, EXTENDED RELEASE ORAL at 18:18

## 2021-02-01 RX ADMIN — SEVELAMER HYDROCHLORIDE 800 MG: 800 TABLET, FILM COATED PARENTERAL at 07:00

## 2021-02-01 NOTE — PROGRESS NOTES
[facility]    Progress Note - Oliver SEBASTIAN Core 1940, 68 y o  male MRN: [id number]    Unit/Bed#: CW2 213-01 Encounter: [id number]    Primary Care Provider: EL Cuevas   Date and time admitted to hospital: 1/26/2021  1:24 PM        * Acute on chronic diastolic (congestive) heart failure (HCC)  Assessment & Plan  Wt Readings from Last 3 Encounters:   02/01/21 77 1 kg (169 lb 14 4 oz)   01/19/21 80 3 kg (177 lb)   01/11/21 78 9 kg (174 lb)     · Presents with 15 lb weight gain in one month, GONZALES, worsening lower extremity edema  Outpatient cardiology team ordered extra dose of diuretic however no improvement  Patient was noted to have AFSHIN on outpatient labs and referred to ED  · Outpatient diuretic regimen bumex 4 mg PO BID with k 20 BID and metolazone 5 mg  2 x weekly  · Heart failure following, now on IV Bumex gtt at 0 5 mg/hr  · Last echo 10/2020 showed preserved EF  Mild MR  Severe TR    · I/O, daily weights, low NA diet with FR  · Monitor volume status--improving  Atrial fibrillation ([address] )  Assessment & Plan  · Rate controlled on Lopressor  · Anticoagulated on Coumadin  Initially held secondary to supratherapeutic INR  Resumed 1/28 but now on hold again for PD catheter placement  · INR in AM    Stage 4 chronic kidney disease [facility])  Assessment & Plan  Lab Results   Component Value Date    EGFR 14 02/01/2021    EGFR 14 01/31/2021    EGFR 15 01/30/2021    CREATININE 3 91 (H) 02/01/2021    CREATININE 3 79 (H) 01/31/2021    CREATININE 3 73 (H) 01/30/2021   · Baseline creatinine 2-2 9 approximately  4 19 on presentation, 3 91 today  · Hold norvasc 2/2 soft BP  · Nephrology following  Planning for PD  Catheter to be  Inserted tentatively Tuesday in IR  Discussed with Dr Briseida Jones, tentative plan would then be for outpatient f/u and PD education classes and then initiation in about 2 weeks     · Avoid nephrotoxins and hypotension  · BMP in AM  · Known to Dr Cari Burkett as outpatient    Benign essential hypertension  Assessment & Plan  · Blood pressure on soft side  · Hold norvasc  Continue Lopressor with hold parameters, decreased to 12 5 mg BID  · Monitor      Dissection of thoracic aorta [facility])  Assessment & Plan  · 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  · 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  Risk associated with surgical options therefore recommended continued observation  Hyperlipidemia  Assessment & Plan  · Continue statin    Anemia  Assessment & Plan  · Chronic, stable at baseline  · IV iron per nephro    Insomnia  Assessment & Plan  · Continue melatonin  · Continue Trazodone HS    Pharmacologic: Pharmacologic VTE Prophylaxis contraindicated due to coumadin on hold  Mechanical VTE Prophylaxis in Place: Yes    Patient Centered Rounds: I have performed bedside rounds with nursing staff today  Discussions with Specialists or Other Care Team Provider: nursing    Education and Discussions with Family / Patient: patient and daughter over phone     Time Spent for Care: 20 minutes  More than 50% of total time spent on counseling and coordination of care as described above  Current Length of Stay: 6 day(s)    Current Patient Status: Inpatient   Certification Statement: The patient will continue to require additional inpatient hospital stay due to IV diuretics, PD cath placement     Discharge Plan / Estimated Discharge Date:  Pending Cardiology/Nephrology clearance      Code Status: Level 1 - Full Code      Subjective:   Patient offers no acute complaints  No chest pain, shortness of breath  Lower extremity edema continues to improve  Sleeping better      Objective:     Vitals:   Temp (24hrs), Av °F (36 7 °C), Min:97 8 °F (36 6 °C), Max:98 2 °F (36 8 °C)    Temp:  [97 8 °F (36 6 °C)-98 2 °F (36 8 °C)] 97 8 °F (36 6 °C)  HR:  [48-58] 51  Resp:  [16-18] 17  BP: (100-108)/(56-63) 108/56  SpO2:  [93 %-98 %] 96 %  Body mass index is 26 61 kg/m²  Input and Output Summary (last 24 hours): Intake/Output Summary (Last 24 hours) at 2/1/2021 0827  Last data filed at 2/1/2021 0001  Gross per 24 hour   Intake 620 ml   Output 1600 ml   Net -980 ml       Physical Exam:     Physical Exam  Vitals signs and nursing note reviewed  HENT:      Head: Normocephalic  Cardiovascular:      Rate and Rhythm: Bradycardia present  Heart sounds: Murmur present  Pulmonary:      Breath sounds: Decreased breath sounds (In bases) present  Abdominal:      Tenderness: There is no abdominal tenderness  Musculoskeletal:         General: Swelling (Overall improved) present  Skin:     General: Skin is warm  Neurological:      Mental Status: He is alert and oriented to person, place, and time  Mental status is at baseline  Psychiatric:         Mood and Affect: Mood normal          Additional Data:     Labs:    Results from last 7 days   Lab Units 01/29/21  0521   WBC Thousand/uL 5 03   HEMOGLOBIN g/dL 8 2*   HEMATOCRIT % 26 1*   PLATELETS Thousands/uL 99*   NEUTROS PCT % 58   LYMPHS PCT % 18   MONOS PCT % 21*   EOS PCT % 3     Results from last 7 days   Lab Units 02/01/21  0512  01/27/21  0504   POTASSIUM mmol/L 4 0   < > 4 1   CHLORIDE mmol/L 106   < > 107   CO2 mmol/L 31   < > 31   BUN mg/dL 119*   < > 115*   CREATININE mg/dL 3 91*   < > 3 94*   CALCIUM mg/dL 9 3   < > 9 0   ALK PHOS U/L  --   --  79   ALT U/L  --   --  20   AST U/L  --   --  26    < > = values in this interval not displayed       Results from last 7 days   Lab Units 01/30/21  0527   INR  2 33*         Recent Cultures (last 7 days):           Last 24 Hours Medication List:   Current Facility-Administered Medications   Medication Dose Route Frequency Provider Last Rate    acetaminophen  650 mg Oral Q6H PRN EL Roth      atorvastatin  40 mg Oral Daily EL Roth      bumetanide  0 5 mg/hr Intravenous Continuous Tigist Collazo PA-C 0 5 mg/hr (01/31/21 1139)    melatonin  6 mg Oral HS EL Roth      metoprolol tartrate  12 5 mg Oral Q12H [address], MD      ondansetron  4 mg Intravenous Q6H PRN EL Roth      potassium chloride  20 mEq Oral BID Tigist Collazo PA-C      sevelamer  800 mg Oral TID With Meals Jihan Torres DO      sodium chloride (PF)  3 mL Intravenous Q1H PRN EL Roth      traZODone  25 mg Oral HS EL Alaniz          Today, Patient Was Seen By: EL Scales    ** Please Note: Dragon 360 Dictation voice to text software may have been used in the creation of this document   **

## 2021-02-01 NOTE — ASSESSMENT & PLAN NOTE
· Rate controlled on Lopressor  · Anticoagulated on Coumadin  Initially held secondary to supratherapeutic INR  Resumed 1/28 but now on hold again for PD catheter placement    · INR in AM

## 2021-02-01 NOTE — PROGRESS NOTES
Advanced Heart Failure / Pulmonary Hypertension Service Progress Note    Oliver Sharpe 68 y o  male   MRN: [id number]  Unit/Bed#: CW2 213-01; Encounter: [id number]    Assessment:  Principal Problem:    Acute on chronic diastolic (congestive) heart failure (HCC)  Active Problems:    Benign essential hypertension    Dissection of thoracic aorta (HCC)    Hyperlipidemia    Anemia    Stage 4 chronic kidney disease (HCC)    Atrial fibrillation (HCC)    Insomnia      Subjective:   Lela Pierre is a 21-year-old man with PMH as below who presented to [facility] on 01/26/2021 after being contacted by cardiology office to review abnormal renal function  Patient had increased frequency of metolazone dosing and had minimal improvement with no reported weight loss or symptomatic improvement  Case was discussed with Dr Gurinder Contreras (per documentation) and decision was made to have patient proceed to ED for further evaluation  Patient seen and examined  No significant events overnight  Denies PND and orthopnea  Edra Woodrow for discharge  Objective: Intake/ Output: 620 mL / 980 mL (net negative 980 mL)  Weight: 169 lbs (down from 168 lbs on 01/31)  Telemetry: normal sinus rhythm, rates in 50-60s  Rates in 40s overnight  Today's Plan:   Continue on IV Bumex 0 5 mg/hour  Possible transition to PO Bumex today, per nephrology  Agree   Dry weight in 165-170 lbs range per chart review   Plan for PD catheter placement tomorrow  Plan:  Acute on chronic HFpEF; LVEF 60%; LVIDd 4 69 cm; NYHA III; ACC/AHA Stage C              Etiology: Afib, HTN phenotype                TTE 04/24/2019: LVEF 60%  LVIDd 4 31 cm  IVSd 1 4 cm  Grade 1 DD  Normal RV size and RVSF  Mild TR  PASP 35 mmHg                TTE 10/06/2020: LVEF 60%  LVIDd 4 69 cm  IVSd 1 41 cm  Grade 2 DD  Normal RV size and RVSF  KUNAL  Mild MR  Severe TR                 Reviewed importance of low sodium diet and fluid restriction               Strict I&Os with daily weights  CV diet with 2L fluid restriction       Neurohormonal Blockade:  --Beta Blocker: metoprolol tartrate 25 mg q12 hours  --ARNi / ACEi / ARB: No   --Aldosterone Antagonist: No   --SGLT2 Inhibitor: No   --Home Diuretic: PO Bumex 4 mg BID with PRN metolazone 5 mg and potassium 20 mEq BID  --Inpatient Diuretic: IV Bumex 0 5 mg/hour with potassium 20 mEq BID       Sudden Cardiac Death Risk Reduction:  --LVEF >35%     Electrical Resynchronization:  --Candidacy for BiV device: narrow QRS     Advanced Therapies: Will continue to monitor      Atrial fibrillation, paroxysmal              [id number] = 4 (age, HF, HTN)              Anticoagulation on Coumadin  Continue on BB as above       Chronic kidney disease, stage IV              Baseline creatinine of 2 3-2 5                Briefly on dialysis in February 2020                Today, creatinine of 3 91 (up from 3 79 on 01/31)              [facility] with Dr Linda Bernard as outpatient  Nephrology planning for PD catheter placement in near future       History of Type A aortic dissection              S/p emergent replacement of ascending aorta with hemiarch reconstruction and aortic valve resuspension with a 26 mm Dacron graft on 12/15/2019 by Dr Niles Perry      Hypertension  Hyperlipidemia  Benign prostatic hyperplasia  History of COVID-19 infection: diagnosed in 07/2020  Vitals:   Blood pressure 108/56, pulse (!) 51, temperature 97 8 °F (36 6 °C), temperature source Oral, resp  rate 17, height 5' 7" (1 702 m), weight 77 1 kg (169 lb 14 4 oz), SpO2 96 %  Body mass index is 26 61 kg/m²  I/O last 3 completed shifts:   In: 620 [P O :620]  Out: 2280 [Urine:2280]    Wt Readings from Last 3 Encounters:   02/01/21 77 1 kg (169 lb 14 4 oz)   01/19/21 80 3 kg (177 lb)   01/11/21 78 9 kg (174 lb)     Vitals:    01/31/21 2139 02/01/21 0233 02/01/21 0538 02/01/21 0729   BP: 108/60 107/59  108/56   BP Location:    Left arm   Pulse: 55 (!) 48  (!) 51   Resp:    17   Temp:    97 8 °F (36 6 °C)   TempSrc:    Oral   SpO2: 93% 98%  96%   Weight:   77 1 kg (169 lb 14 4 oz)    Height:           Physical Exam  Vitals signs reviewed  Constitutional:       General: He is awake  He is not in acute distress  Appearance: Normal appearance  He is well-developed  Comments: Fabric face mask present   HENT:      Head: Normocephalic  Nose: Nose normal       Mouth/Throat:      Mouth: Mucous membranes are moist    Eyes:      General: No scleral icterus  Conjunctiva/sclera: Conjunctivae normal    Neck:      Musculoskeletal: Neck supple  Vascular: JVD present  Trachea: No tracheal deviation  Cardiovascular:      Rate and Rhythm: Regular rhythm  Bradycardia present  No extrasystoles are present  Pulses: Normal pulses  Heart sounds: Murmur present  Pulmonary:      Effort: Pulmonary effort is normal  No tachypnea, bradypnea or respiratory distress  Breath sounds: Normal air entry  No decreased breath sounds, rhonchi or rales  Abdominal:      General: Abdomen is flat  Bowel sounds are normal  There is distension (mild)  Tenderness: There is no abdominal tenderness  Musculoskeletal:      Right lower leg: No edema  Left lower leg: No edema  Skin:     General: Skin is warm and dry  Coloration: Skin is not pale  Neurological:      General: No focal deficit present  Mental Status: He is alert and oriented to person, place, and time  Psychiatric:         Attention and Perception: Attention normal          Mood and Affect: Mood and affect normal          Speech: Speech normal          Behavior: Behavior normal  Behavior is cooperative  Thought Content:  Thought content normal      Central Line (day, reason): No    [place] Catheter (day, reason): No     Current Facility-Administered Medications:     acetaminophen (TYLENOL) tablet 650 mg, 650 mg, Oral, Q6H PRN, EL Nayak   atorvastatin (LIPITOR) tablet 40 mg, 40 mg, Oral, Daily, EL Roth, 40 mg at 01/31/21 6800    bumetanide (BUMEX) 12 5 mg infusion 50 mL, 0 5 mg/hr, Intravenous, Continuous, Robe Dias PA-C, Last Rate: 2 mL/hr at 01/31/21 1139, 0 5 mg/hr at 01/31/21 1139    melatonin tablet 6 mg, 6 mg, Oral, HS, EL Roth, 6 mg at 01/31/21 2139    metoprolol tartrate (LOPRESSOR) partial tablet 12 5 mg, 12 5 mg, Oral, Q12H [address], Orion Rodriguez MD, 12 5 mg at 01/31/21 2139    ondansetron (ZOFRAN) injection 4 mg, 4 mg, Intravenous, Q6H PRN, EL Roth    potassium chloride (K-DUR,KLOR-CON) CR tablet 20 mEq, 20 mEq, Oral, BID, Robe Dias PA-C, 20 mEq at 01/31/21 5011    sevelamer (RENAGEL) tablet 800 mg, 800 mg, Oral, TID With Meals, [employer] Torres, DO, 800 mg at 01/31/21 1653    Insert peripheral IV, , , Once **AND** sodium chloride (PF) 0 9 % injection 3 mL, 3 mL, Intravenous, Q1H PRN, EL Roth    traZODone (DESYREL) tablet 25 mg, 25 mg, Oral, HS, EL Roth, 25 mg at 01/31/21 2139    Labs & Results:  Results from last 7 days   Lab Units 01/26/21  1418   TROPONIN I ng/mL <0 02     Results from last 7 days   Lab Units 01/29/21  0521 01/28/21  0541 01/27/21  0504   WBC Thousand/uL 5 03 5 53 5 06   HEMOGLOBIN g/dL 8 2* 8 3* 8 3*   HEMATOCRIT % 26 1* 26 2* 26 5*   PLATELETS Thousands/uL 99* 100* 102*         Results from last 7 days   Lab Units 02/01/21  0512 01/31/21  0444 01/30/21  0527  01/27/21  0504   POTASSIUM mmol/L 4 0 3 5 3 3*   < > 4 1   CHLORIDE mmol/L 106 104 102   < > 107   CO2 mmol/L 31 31 31   < > 31   BUN mg/dL 119* 122* 112*   < > 115*   CREATININE mg/dL 3 91* 3 79* 3 73*   < > 3 94*   CALCIUM mg/dL 9 3 9 5 9 1   < > 9 0   ALK PHOS U/L  --   --   --   --  79   ALT U/L  --   --   --   --  20   AST U/L  --   --   --   --  26    < > = values in this interval not displayed       Results from last 7 days   Lab Units 01/30/21  0527 01/29/21  0521 01/28/21  0842   INR  2 33* 2 63* 2 98*     Penelope Wallace PA-C none

## 2021-02-01 NOTE — ASSESSMENT & PLAN NOTE
Wt Readings from Last 3 Encounters:   02/01/21 77 1 kg (169 lb 14 4 oz)   01/19/21 80 3 kg (177 lb)   01/11/21 78 9 kg (174 lb)     · Presents with 15 lb weight gain in one month, GONZALES, worsening lower extremity edema  Outpatient cardiology team ordered extra dose of diuretic however no improvement  Patient was noted to have AFSHIN on outpatient labs and referred to ED  · Outpatient diuretic regimen bumex 4 mg PO BID with k 20 BID and metolazone 5 mg  2 x weekly  · Heart failure following, now on IV Bumex gtt at 0 5 mg/hr  · Last echo 10/2020 showed preserved EF  Mild MR  Severe TR    · I/O, daily weights, low NA diet with FR  · Monitor volume status--improving

## 2021-02-01 NOTE — ASSESSMENT & PLAN NOTE
Lab Results   Component Value Date    EGFR 14 02/01/2021    EGFR 14 01/31/2021    EGFR 15 01/30/2021    CREATININE 3 91 (H) 02/01/2021    CREATININE 3 79 (H) 01/31/2021    CREATININE 3 73 (H) 01/30/2021   · Baseline creatinine 2-2 9 approximately  4 19 on presentation, 3 91 today  · Hold norvasc 2/2 soft BP  · Nephrology following  Planning for PD  Catheter to be  Inserted tentatively Tuesday in IR  Discussed with Dr Rice Session, tentative plan would then be for outpatient f/u and PD education classes and then initiation in about 2 weeks     · Avoid nephrotoxins and hypotension  · BMP in AM  · Known to Dr Kristen Saldaña as outpatient

## 2021-02-01 NOTE — PROGRESS NOTES
NEPHROLOGY PROGRESS NOTE   Chris Sanabria Core 68 y o  male MRN: [id number]  Unit/Bed#: CW2 213-01 Encounter: [id number]      ASSESSMENT & PLAN    1  AFSHIN (POA) on stage 4 chronic kidney disease with a baseline creatinine in the mid 2s and an estimated GFR in the low 20  o He has required hemodialysis in the past  o His creatinine is 3 9-4 now in stable  o Order placed for PD catheter placement  o Still with volume overload diuresing  o Fluid removal as tolerated  o Fluid restriction-2 L  o   2  Electrolytes/Acid Base  o Hypokalemia-continue to replete as needed    3  Blood Pressure-hypotension  o Maps are acceptable  o Low-dose metoprolol  o Cardiology follow-up  o Diuresing    4  Anemia of CKD  o Trend hemoglobin  o Received course of Venofer    5   CKD-BMD  o Hyperphosphatemia-300 mg t i d     6  Clinical Course/CV Risk Reduction/Health maintenance/communication  o Diastolic heart failure with an ejection fraction of 60%  o 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    SUBJECTIVE:    Patient was seen today  Sitting up resting comfortably    OBJECTIVE:  Current Weight: Weight - Scale: 77 1 kg (169 lb 14 4 oz)  @  Vitals:    01/31/21 2139 02/01/21 0233 02/01/21 0538 02/01/21 0729   BP: 108/60 107/59  108/56   BP Location:    Left arm   Pulse: 55 (!) 48  (!) 51   Resp:    17   Temp:    97 8 °F (36 6 °C)   TempSrc:    Oral   SpO2: 93% 98%  96%   Weight:   77 1 kg (169 lb 14 4 oz)    Height:           Intake/Output Summary (Last 24 hours) at 2/1/2021 0910  Last data filed at 2/1/2021 0001  Gross per 24 hour   Intake 380 ml   Output 1300 ml   Net -920 ml     Weight (last 2 days)     Date/Time   Weight    02/01/21 0538   77 1 (169 9)    01/31/21 0551   76 6 (168 87)    01/30/21 0538   77 2 (170 2)              General: conscious, cooperative, in no acute distress  Eyes: conjunctivae pink, anicteric sclerae  ENT: lips and mucous membranes moist  Neck: supple, no JVD  Chest: no respiratory distress, no accessory muscle use, normal respiratory effort  CVS: normal heart rate, no friction rub  Abdomen:  Mildly distended  Extremities: no edema of both legs  Skin: no rash  Neuro: awake, alert, oriented      Medications:    Current Facility-Administered Medications:     acetaminophen (TYLENOL) tablet 650 mg, 650 mg, Oral, Q6H PRN, EL Roth    atorvastatin (LIPITOR) tablet 40 mg, 40 mg, Oral, Daily, EL Roth, 40 mg at 01/31/21 0025    bumetanide (BUMEX) 12 5 mg infusion 50 mL, 0 5 mg/hr, Intravenous, Continuous, Gumaro Prime, PA-C, Last Rate: 2 mL/hr at 01/31/21 1139, 0 5 mg/hr at 01/31/21 1139    melatonin tablet 6 mg, 6 mg, Oral, HS, EL Roth, 6 mg at 01/31/21 2139    metoprolol tartrate (LOPRESSOR) partial tablet 12 5 mg, 12 5 mg, Oral, Q12H [facility], Pb Alonso MD, 12 5 mg at 01/31/21 2139    ondansetron (ZOFRAN) injection 4 mg, 4 mg, Intravenous, Q6H PRN, EL Roth    potassium chloride (K-DUR,KLOR-CON) CR tablet 20 mEq, 20 mEq, Oral, BID, Gumaro Prime, PA-C, 20 mEq at 01/31/21 1923    sevelamer (RENAGEL) tablet 800 mg, 800 mg, Oral, TID With Meals, Aishwarya Torres DO, 800 mg at 01/31/21 1653    Insert peripheral IV, , , Once **AND** sodium chloride (PF) 0 9 % injection 3 mL, 3 mL, Intravenous, Q1H PRN, EL Roth    traZODone (DESYREL) tablet 25 mg, 25 mg, Oral, HS, EL Roth, 25 mg at 01/31/21 2139    Invasive Devices:      Lab Results:   Results from last 7 days   Lab Units 02/01/21  0512 01/31/21  0444 01/30/21  0527 01/29/21  0521 01/28/21  0541 01/27/21  0504 01/26/21  1418   WBC Thousand/uL  --   --   --  5 03 5 53 5 06 6 04   HEMOGLOBIN g/dL  --   --   --  8 2* 8 3* 8 3* 9 0*   HEMATOCRIT %  --   --   --  26 1* 26 2* 26 5* 28 7*   PLATELETS Thousands/uL  --   --   --  99* 100* 102* 113*   POTASSIUM mmol/L 4 0 3 5 3 3* 3 3* 3 4* 4 1  --    CHLORIDE mmol/L 106 104 102 104 103 107  --    CO2 mmol/L 31 31 31 30 32 31  --    BUN mg/dL 119* 122* 112* 118* 112* 115*  --    CREATININE mg/dL 3 91* 3 79* 3 73* 3 97* 4 00* 3 94*  --    CALCIUM mg/dL 9 3 9 5 9 1 8 9 8 8 9 0  --    MAGNESIUM mg/dL  --   --   --  2 3  --   --   --    PHOSPHORUS mg/dL  --   --  5 4*  --   --   --   --    ALK PHOS U/L  --   --   --   --   --  79  --    ALT U/L  --   --   --   --   --  20  --    AST U/L  --   --   --   --   --  26  --          Portions of the record may have been created with voice recognition software  Occasional wrong word or "sound a like" substitutions may have occurred due to the inherent limitations of voice recognition software  Read the chart carefully and recognize, using context, where substitutions have occurred  If you have any questions, please contact the dictating provider

## 2021-02-02 LAB
ANION GAP SERPL CALCULATED.3IONS-SCNC: 9 MMOL/L (ref 4–13)
BUN SERPL-MCNC: 121 MG/DL (ref 5–25)
CALCIUM SERPL-MCNC: 9.5 MG/DL (ref 8.3–10.1)
CHLORIDE SERPL-SCNC: 104 MMOL/L (ref 100–108)
CO2 SERPL-SCNC: 28 MMOL/L (ref 21–32)
CREAT SERPL-MCNC: 4.31 MG/DL (ref 0.6–1.3)
GFR SERPL CREATININE-BSD FRML MDRD: 12 ML/MIN/1.73SQ M
GLUCOSE SERPL-MCNC: 81 MG/DL (ref 65–140)
INR PPP: 1.99 (ref 0.84–1.19)
POTASSIUM SERPL-SCNC: 4.2 MMOL/L (ref 3.5–5.3)
PROTHROMBIN TIME: 22.5 SECONDS (ref 11.6–14.5)
SODIUM SERPL-SCNC: 141 MMOL/L (ref 136–145)

## 2021-02-02 PROCEDURE — 99232 SBSQ HOSP IP/OBS MODERATE 35: CPT | Performed by: NURSE PRACTITIONER

## 2021-02-02 PROCEDURE — 99233 SBSQ HOSP IP/OBS HIGH 50: CPT | Performed by: INTERNAL MEDICINE

## 2021-02-02 PROCEDURE — P9017 PLASMA 1 DONOR FRZ W/IN 8 HR: HCPCS

## 2021-02-02 PROCEDURE — 99232 SBSQ HOSP IP/OBS MODERATE 35: CPT | Performed by: INTERNAL MEDICINE

## 2021-02-02 PROCEDURE — 80048 BASIC METABOLIC PNL TOTAL CA: CPT | Performed by: NURSE PRACTITIONER

## 2021-02-02 PROCEDURE — 85610 PROTHROMBIN TIME: CPT | Performed by: NURSE PRACTITIONER

## 2021-02-02 PROCEDURE — NC001 PR NO CHARGE: Performed by: STUDENT IN AN ORGANIZED HEALTH CARE EDUCATION/TRAINING PROGRAM

## 2021-02-02 RX ORDER — BUMETANIDE 0.25 MG/ML
4 INJECTION, SOLUTION INTRAMUSCULAR; INTRAVENOUS ONCE
Status: DISCONTINUED | OUTPATIENT
Start: 2021-02-02 | End: 2021-02-02

## 2021-02-02 RX ORDER — BUMETANIDE 2 MG/1
4 TABLET ORAL 2 TIMES DAILY
Status: DISCONTINUED | OUTPATIENT
Start: 2021-02-02 | End: 2021-02-02

## 2021-02-02 RX ORDER — BUMETANIDE 2 MG/1
4 TABLET ORAL
Status: DISCONTINUED | OUTPATIENT
Start: 2021-02-02 | End: 2021-02-02

## 2021-02-02 RX ORDER — BUMETANIDE 2 MG/1
4 TABLET ORAL 2 TIMES DAILY
Status: COMPLETED | OUTPATIENT
Start: 2021-02-02 | End: 2021-02-02

## 2021-02-02 RX ORDER — BUMETANIDE 2 MG/1
4 TABLET ORAL 2 TIMES DAILY
Status: DISCONTINUED | OUTPATIENT
Start: 2021-02-03 | End: 2021-02-02

## 2021-02-02 RX ADMIN — SEVELAMER HYDROCHLORIDE 800 MG: 800 TABLET, FILM COATED PARENTERAL at 07:13

## 2021-02-02 RX ADMIN — BUMETANIDE 4 MG: 2 TABLET ORAL at 17:24

## 2021-02-02 RX ADMIN — POTASSIUM CHLORIDE 20 MEQ: 1500 TABLET, EXTENDED RELEASE ORAL at 09:15

## 2021-02-02 RX ADMIN — POTASSIUM CHLORIDE 20 MEQ: 1500 TABLET, EXTENDED RELEASE ORAL at 17:24

## 2021-02-02 RX ADMIN — BUMETANIDE 4 MG: 2 TABLET ORAL at 14:17

## 2021-02-02 RX ADMIN — SEVELAMER HYDROCHLORIDE 800 MG: 800 TABLET, FILM COATED PARENTERAL at 21:02

## 2021-02-02 RX ADMIN — MELATONIN TAB 3 MG 6 MG: 3 TAB at 21:02

## 2021-02-02 RX ADMIN — SEVELAMER HYDROCHLORIDE 800 MG: 800 TABLET, FILM COATED PARENTERAL at 14:16

## 2021-02-02 RX ADMIN — TRAZODONE HYDROCHLORIDE 25 MG: 50 TABLET ORAL at 21:02

## 2021-02-02 RX ADMIN — ATORVASTATIN CALCIUM 40 MG: 40 TABLET, FILM COATED ORAL at 09:15

## 2021-02-02 NOTE — PROGRESS NOTES
[address] Internal Medicine    Progress Note - Oliver SEBASTIAN Core 1940, 68 y o  male MRN: [id number]    Unit/Bed#: CW2 213-01 Encounter: [id number]    Primary Care Provider: EL Lentz   Date and time admitted to hospital: 1/26/2021  1:24 PM    * Acute on chronic diastolic (congestive) heart failure (HCC)  Assessment & Plan  Wt Readings from Last 3 Encounters:   02/02/21 76 7 kg (169 lb 1 5 oz)   01/19/21 80 3 kg (177 lb)   01/11/21 78 9 kg (174 lb)     · Presents with 15 lb weight gain in one month, GONZALES, worsening lower extremity edema  Outpatient cardiology team ordered extra dose of diuretic however no improvement  Patient was noted to have AFSHIN on outpatient labs and referred to ED  · Outpatient diuretic regimen bumex 4 mg PO BID with k 20 BID and metolazone 5 mg  2 x weekly  · Heart failure following, was on Bumex drip for several days  Discontinued 2/1  Giving additional intravenous Bumex today  · Last echo 10/2020 showed preserved EF  Mild MR  Severe TR    · I/O, daily weights, low NA diet with FR  · Monitor volume status--improving  Atrial fibrillation ([address] )  Assessment & Plan  · Rate controlled on Lopressor  · Anticoagulated on Coumadin  Initially held secondary to supratherapeutic INR  Resumed 1/28 but now on hold again for PD catheter placement which was canceled for 2/2 and rescheduled for tomorrow  · INR in AM    Stage 4 chronic kidney disease [facility])  Assessment & Plan  Lab Results   Component Value Date    EGFR 12 02/02/2021    EGFR 14 02/01/2021    EGFR 14 01/31/2021    CREATININE 4 31 (H) 02/02/2021    CREATININE 3 91 (H) 02/01/2021    CREATININE 3 79 (H) 01/31/2021   · Baseline creatinine 2-2 9 approximately  4 19 on presentation, 4 31 today  · Hold norvasc 2/2 soft BP  · Nephrology following  Planning for PD  Catheter to be  Inserted tomorrow  Was canceled today  Discussed with Dr Costa Sears PD training at [address] and then subsequent initiation   Dr Diego Zeng setting this up    · Avoid nephrotoxins and hypotension  · BMP in AM  · Known to Dr Vaughn Bender as outpatient    Benign essential hypertension  Assessment & Plan  · Blood pressure on soft side  · Hold norvasc  Continue Lopressor with hold parameters, decreased to 12 5 mg BID secondary to bradycardia  · Monitor      Dissection of thoracic aorta [facility])  Assessment & Plan  · 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  · 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 · Continue telemetry    Hyperlipidemia  Assessment & Plan  · Continue statin    Anemia  Assessment & Plan  · Chronic, stable at baseline  · Received IV iron per nephro    Insomnia  Assessment & Plan  · Continue melatonin  · Continue Trazodone HS    Pharmacologic: Pharmacologic VTE Prophylaxis contraindicated due to coumadin on hold for PD cath placement     Mechanical VTE Prophylaxis in Place: Yes    Patient Centered Rounds: I have performed bedside rounds with nursing staff today  Discussions with Specialists or Other Care Team Provider: nursing, nephrology, cardiology     Education and Discussions with Family / Patient: patient and daughter     Time Spent for Care: 20 minutes  More than 50% of total time spent on counseling and coordination of care as described above      Current Length of Stay: 7 day(s)    Current Patient Status: Inpatient   Certification Statement: The patient will continue to require additional inpatient hospital stay due to PD cath tomorrow, await nephrology/cardiology clearance    Discharge Plan / Estimated Discharge Date: not stable for d/c, pending PD cath placement and subsequent cards/nephro clearance  Code Status: Level 1 - Full Code      Subjective:   Patient offers no acute complaints  Very anxious for discharge  Very worried about his wife at home alone  Objective:     Vitals:   Temp (24hrs), Av 1 °F (36 2 °C), Min:94 9 °F (34 9 °C), Max:98 °F (36 7 °C)    Temp:  [94 9 °F (34 9 °C)-98 °F (36 7 °C)] 98 °F (36 7 °C)  HR:  [44-52] 48  Resp:  [17-18] 18  BP: (101-111)/(54-61) 111/61  SpO2:  [95 %-98 %] 95 %  Body mass index is 26 48 kg/m²  Input and Output Summary (last 24 hours): Intake/Output Summary (Last 24 hours) at 2021 1059  Last data filed at 2021 0800  Gross per 24 hour   Intake 120 ml   Output 550 ml   Net -430 ml       Physical Exam:     Physical Exam  Vitals signs and nursing note reviewed  Cardiovascular:      Rate and Rhythm: Bradycardia present  Heart sounds: Murmur present  Pulmonary:      Breath sounds: Decreased breath sounds present  Abdominal:      Tenderness: There is no abdominal tenderness  Musculoskeletal:         General: No swelling  Skin:     General: Skin is warm  Neurological:      Mental Status: He is alert and oriented to person, place, and time  Mental status is at baseline  Psychiatric:         Mood and Affect: Mood normal          Additional Data:     Labs:    Results from last 7 days   Lab Units 21  0521   WBC Thousand/uL 5 03   HEMOGLOBIN g/dL 8 2*   HEMATOCRIT % 26 1*   PLATELETS Thousands/uL 99*   NEUTROS PCT % 58   LYMPHS PCT % 18   MONOS PCT % 21*   EOS PCT % 3     Results from last 7 days   Lab Units 21  0704  21  0504   POTASSIUM mmol/L 4 2   < > 4 1   CHLORIDE mmol/L 104   < > 107   CO2 mmol/L 28   < > 31   BUN mg/dL 121*   < > 115*   CREATININE mg/dL 4 31*   < > 3 94*   CALCIUM mg/dL 9 5   < > 9 0   ALK PHOS U/L  --   --  79   ALT U/L  --   --  20   AST U/L  --   --  26    < > = values in this interval not displayed       Results from last 7 days   Lab Units 21  0659 INR  1 99*         Recent Cultures (last 7 days):           Last 24 Hours Medication List:   Current Facility-Administered Medications   Medication Dose Route Frequency Provider Last Rate    acetaminophen  650 mg Oral Q6H PRN EL Roth      atorvastatin  40 mg Oral Daily EL Roth      bumetanide  4 mg Intravenous Once [employer], EL      bumetanide  4 mg Oral BID (diuretic) EL Sanabria      melatonin  6 mg Oral HS EL Roth      metoprolol tartrate  12 5 mg Oral Q12H [address], MD      ondansetron  4 mg Intravenous Q6H PRN EL Roth      potassium chloride  20 mEq Oral BID Jame TRINY Hampton      sevelamer  800 mg Oral TID With Meals Allyn Torres DO      sodium chloride (PF)  3 mL Intravenous Q1H PRN EL Roth      traZODone  25 mg Oral HS EL Squires          Today, Patient Was Seen By: EL Molina    ** Please Note: Dragon 360 Dictation voice to text software may have been used in the creation of this document   **

## 2021-02-02 NOTE — ASSESSMENT & PLAN NOTE
· Also with 2 second pause noted  · Lopressor dose decreased  · Asymptomatic  · Appreciate cardiology recommendations, possible Zio patch?   · Continue telemetry

## 2021-02-02 NOTE — CASE MANAGEMENT
Patient reviewed with Lucas Bryant who informed that pt is to get his PD cath tomorrow and will require clearance from Cardiology prior to discharge  Pt to follow up with [address] for outpatient PD training

## 2021-02-02 NOTE — PROGRESS NOTES
NEPHROLOGY PROGRESS NOTE   Srinivas Lopez Core 68 y o  male MRN: [id number]  Unit/Bed#: CW2 213-01 Encounter: [id number]      ASSESSMENT & PLAN    1  AFSHIN (POA) on stage 4 chronic kidney disease with a baseline creatinine in the mid 2s and an estimated GFR in the low 20  ? He has required hemodialysis in the past  ? His creatinine is 3 9-4 now in stable-now slightly higher but GFR relatively unchanged  ? Order placed for PD catheter placement-platelet count low will require some FFP  ? Now on oral diuretic-will hold a m  Dose prior to PD catheter placed  ? Fluid removal as tolerated  ? Fluid restriction-2 L  ? Once PD catheter placed outpatient follow-up for training    2  Electrolytes/Acid Base  ? Hypokalemia-continue to replete as needed     3  Blood Pressure-hypotension  ? Maps are acceptable  ? Low-dose metoprolol  ? Cardiology follow-up  ? Diuresing-Bumex 4 mg p o  B i d      4  Anemia of CKD  ? Trend hemoglobin  ? Received course of Venofer  ? Thrombocytopenia-platelets pre PD catheter placed     5  CKD-BMD  ? Hyperphosphatemia-300 mg t i d      6  Clinical Course/CV Risk Reduction/Health maintenance/communication  ? Diastolic heart failure with an ejection fraction of 60%  ? 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       SUBJECTIVE:    Patient was seen today  Sitting up comfortable  No chest pain or shortness of breath  Tolerating p o   Intake    OBJECTIVE:  Current Weight: Weight - Scale: 76 7 kg (169 lb 1 5 oz)  @  Vitals:    02/02/21 0920 02/02/21 0921 02/02/21 0926 02/02/21 0927   BP:  111/61 111/61 111/61   Pulse: (!) 47 (!) 48 (!) 47 (!) 48   Resp:       Temp:  98 °F (36 7 °C) 98 °F (36 7 °C) 98 °F (36 7 °C)   TempSrc:       SpO2: 95% 95% 95% 95%   Weight:       Height:           Intake/Output Summary (Last 24 hours) at 2/2/2021 1131  Last data filed at 2/2/2021 0800  Gross per 24 hour   Intake 120 ml   Output 550 ml   Net -430 ml Weight (last 2 days)     Date/Time   Weight    02/02/21 0600   76 7 (169 09)    02/01/21 0538   77 1 (169 9)    01/31/21 0551   76 6 (168 87)              General: conscious, cooperative, in no acute distress  Eyes: conjunctivae pink, anicteric sclerae  ENT: lips and mucous membranes moist  Neck: supple, no JVD  Chest: no respiratory distress, no accessory muscle use, normal respiratory effort  CVS: normal heart rate, no friction rub  Abdomen:  Distended abdomen  Extremities: no edema of both legs  Skin: no rash  Neuro: awake, alert, oriented      Medications:    Current Facility-Administered Medications:     acetaminophen (TYLENOL) tablet 650 mg, 650 mg, Oral, Q6H PRN, EL Roth    atorvastatin (LIPITOR) tablet 40 mg, 40 mg, Oral, Daily, EL Roth, 40 mg at 02/02/21 0915    bumetanide (BUMEX) injection 4 mg, 4 mg, Intravenous, Once, EL Sanabria    bumetanide (BUMEX) tablet 4 mg, 4 mg, Oral, BID (diuretic), EL Sanabria    [START ON 2/3/2021] bumetanide (BUMEX) tablet 4 mg, 4 mg, Oral, BID, EL Sanabria    melatonin tablet 6 mg, 6 mg, Oral, HS, EL Roth, 6 mg at 02/01/21 2223    metoprolol tartrate (LOPRESSOR) partial tablet 12 5 mg, 12 5 mg, Oral, Q12H [facility], Freddy Ugarte MD, 12 5 mg at 02/01/21 2224    ondansetron (ZOFRAN) injection 4 mg, 4 mg, Intravenous, Q6H PRN, EL Roth    potassium chloride (K-DUR,KLOR-CON) CR tablet 20 mEq, 20 mEq, Oral, BID, Tigist Collazo PA-C, 20 mEq at 02/02/21 0915    sevelamer (RENAGEL) tablet 800 mg, 800 mg, Oral, TID With Meals, Jihan Torres DO, 800 mg at 02/02/21 6636    Insert peripheral IV, , , Once **AND** sodium chloride (PF) 0 9 % injection 3 mL, 3 mL, Intravenous, Q1H PRN, EL Roth    traZODone (DESYREL) tablet 25 mg, 25 mg, Oral, HS, EL Roth, 25 mg at 02/01/21 5853       Lab Results:   Results from last 7 days   Lab Units 02/02/21  0787 02/01/21  4375 01/31/21  0444 01/30/21  0527 01/29/21  0521 01/28/21  0541 01/27/21  0504  01/26/21  1418   WBC Thousand/uL  --   --   --   --  5 03 5 53 5 06  --  6 04   HEMOGLOBIN g/dL  --   --   --   --  8 2* 8 3* 8 3*  --  9 0*   HEMATOCRIT %  --   --   --   --  26 1* 26 2* 26 5*  --  28 7*   PLATELETS Thousands/uL  --   --   --   --  99* 100* 102*  --  113*   POTASSIUM mmol/L 4 2 4 0 3 5 3 3* 3 3* 3 4* 4 1   < >  --    CHLORIDE mmol/L 104 106 104 102 104 103 107   < >  --    CO2 mmol/L 28 31 31 31 30 32 31   < >  --    BUN mg/dL 121* 119* 122* 112* 118* 112* 115*   < >  --    CREATININE mg/dL 4 31* 3 91* 3 79* 3 73* 3 97* 4 00* 3 94*   < >  --    CALCIUM mg/dL 9 5 9 3 9 5 9 1 8 9 8 8 9 0   < >  --    MAGNESIUM mg/dL  --   --   --   --  2 3  --   --   --   --    PHOSPHORUS mg/dL  --   --   --  5 4*  --   --   --   --   --    ALK PHOS U/L  --   --   --   --   --   --  79  --   --    ALT U/L  --   --   --   --   --   --  20  --   --    AST U/L  --   --   --   --   --   --  26  --   --     < > = values in this interval not displayed  Portions of the record may have been created with voice recognition software  Occasional wrong word or "sound a like" substitutions may have occurred due to the inherent limitations of voice recognition software  Read the chart carefully and recognize, using context, where substitutions have occurred  If you have any questions, please contact the dictating provider

## 2021-02-02 NOTE — ASSESSMENT & PLAN NOTE
Wt Readings from Last 3 Encounters:   02/02/21 76 7 kg (169 lb 1 5 oz)   01/19/21 80 3 kg (177 lb)   01/11/21 78 9 kg (174 lb)     · Presents with 15 lb weight gain in one month, GONZALES, worsening lower extremity edema  Outpatient cardiology team ordered extra dose of diuretic however no improvement  Patient was noted to have AFSHIN on outpatient labs and referred to ED  · Outpatient diuretic regimen bumex 4 mg PO BID with k 20 BID and metolazone 5 mg  2 x weekly  · Heart failure following, was on Bumex drip for several days  Discontinued 2/1  Giving additional intravenous Bumex today  · Last echo 10/2020 showed preserved EF  Mild MR  Severe TR    · I/O, daily weights, low NA diet with FR  · Monitor volume status--improving

## 2021-02-02 NOTE — ASSESSMENT & PLAN NOTE
· Rate controlled on Lopressor  · Anticoagulated on Coumadin  Initially held secondary to supratherapeutic INR  Resumed 1/28 but now on hold again for PD catheter placement which was canceled for 2/2 and rescheduled for tomorrow    · INR in AM

## 2021-02-02 NOTE — PROGRESS NOTES
Heart Failure/ Pulmonary Hypertension Progress Note - Oliver Sharpe 68 y o  male MRN: [id number]    Unit/Bed#: CW2 213-01 Encounter: [id number]      Assessment:    Principal Problem:    Acute on chronic diastolic (congestive) heart failure (HCC)  Active Problems:    Benign essential hypertension    Dissection of thoracic aorta (HCC)    Hyperlipidemia    Anemia    Stage 4 chronic kidney disease (HCC)    Atrial fibrillation (HCC)    Insomnia      Subjective:   Oliver Sharpe is a 71-year-old man with PMH as below who presented to [facility] 01/26/2021 after being contacted by cardiology office to review abnormal renal function  Patient had increased frequency of metolazone dosing and had minimal improvement with no reported weight loss or symptomatic improvement  Case was discussed with Dr Petra Gee and decision was made to have patient proceed to ED for further evaluation  Patient seen and examined  Persistent bradycardia noted as low as the 30's, pauses around 2 5 seconds  Transitioned from Bumex gtt to oral diuretics yesterday  For PD catheter placemen today  Will need 2 units of plasma pre-procedure  Objective: Intake/ Output:120/550/-430 ???  Weight: bed scale ? ?  Standing scale on 2/1 169 lbs  Tele: SB, occ PVC    Acute on chronic HFpEF - Volume management: Plan for PD catheter placement today  To receive 2 units of plasma pre-procedure  Diurese with 4 mg IV Bumex after infusion  Restart oral Bumex 4 mg BID this PM  Antihypertensives- Metoprolol, Amlodipine  Currently enrolled in [facility] program                      TTE 04/24/2019: LVEF 60%  LVIDd 4 31 cm  IVSd 1 4 cm  Grade 1 DD  Normal RV size and RVSF  Mild TR  PASP 35 mmHg                TTE 10/06/2020: LVEF 60%  LVIDd 4 69 cm  IVSd 1 41 cm  Grade 2 DD  Normal RV size and RVSF  KUNAL  Mild MR  Severe TR                Paroxysmal atrial [id number] = 4 (age, CHF, HTN)  Anticoagulation on Coumadin  Metoprolol for rate control    Sinus bradycardia  With rates in the 30-40s seen on telemetry this admit  Consider further monitoring as outpatient, ie Zio patch or HM  Jordin Jiang Will discuss decreasing BB with attending       Hypertension  Well controlled  Continue on amlodipine 5 mg daily and medications as above       Hyperlipidemia  FLP 1/26/21: TC 88, TD 69, HDL 42, LDL 32  Continue on atorvastatin 40 mg daily       Chronic kidney disease, stage IV Baseline creatinine was 2 0-2 3, new baseline likely 3 5-4  Transitioned from Bumex gtt to oral regimen yesterday  For PD catheter placement today   Follows with Dr Irene Paez as outpatient  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 with hemiarch reconstruction and aortic valve resuspension with a 26 mm Dacron graft on 12/15/2019 by Dr Caity Shea               Benign prostatic hyperplasia  History of COVID-19 infection: diagnosed in 07/2020  Review of Systems   All other systems reviewed and are negative  [facility] [place] [facility] (day, reason): [place] [facility] (day, reason):    Vitals: Blood pressure 104/55, pulse (!) 47, temperature 97 7 °F (36 5 °C), resp  rate 17, height 5' 7" (1 702 m), weight 76 7 kg (169 lb 1 5 oz), SpO2 98 %  , Body mass index is 26 48 kg/m² , I/O last 3 completed shifts: In: 120 [P O :120]  Out: 750 [Urine:750]  No intake/output data recorded  Wt Readings from Last 3 Encounters:   02/02/21 76 7 kg (169 lb 1 5 oz)   01/19/21 80 3 kg (177 lb)   01/11/21 78 9 kg (174 lb)       Intake/Output Summary (Last 24 hours) at 2/2/2021 0745  Last data filed at 2/1/2021 1352  Gross per 24 hour   Intake 120 ml   Output 550 ml   Net -430 ml     I/O last 3 completed shifts:   In: 120 [P O :120]  Out: 750 [Urine:750]    SB PVC      Physical Exam:  Vitals:    02/01/21 1916 02/01/21 2016 02/02/21 0600 02/02/21 0714   BP: 109/59 103/57  104/55   BP Location:       Pulse:  (!) 52  (!) 47   Resp:    17   Temp: (!) 94 9 °F (34 9 °C)   97 7 °F (36 5 °C)   TempSrc: SpO2:  98%  98%   Weight:   76 7 kg (169 lb 1 5 oz)    Height:           GEN: Oliver SEBASTIAN Core appears well, alert and oriented x 3, pleasant and cooperative   HEENT: pupils equal, round, and reactive to light; extraocular muscles intact  NECK: supple, no carotid bruits   HEART: regular rhythm, normal S1 and S2, no murmurs, clicks, gallops or rubs, JVP is up  LUNGS: clear to auscultation bilaterally; no wheezes, rales, or rhonchi   ABDOMEN: normal bowel sounds, soft, no tenderness, no distention  EXTREMITIES: peripheral pulses normal; no clubbing, cyanosis, or edema  NEURO: no focal findings   SKIN: normal without suspicious lesions on exposed skin      Current Facility-Administered Medications:     acetaminophen (TYLENOL) tablet 650 mg, 650 mg, Oral, Q6H PRN, EL Roth    atorvastatin (LIPITOR) tablet 40 mg, 40 mg, Oral, Daily, EL Roth, 40 mg at 02/01/21 1024    bumetanide (BUMEX) tablet 4 mg, 4 mg, Oral, BID (diuretic), SUE Tran-C, 4 mg at 02/01/21 1817    melatonin tablet 6 mg, 6 mg, Oral, HS, EL Roth, 6 mg at 02/01/21 2223    metoprolol tartrate (LOPRESSOR) partial tablet 12 5 mg, 12 5 mg, Oral, Q12H [address], Loren Moreno MD, 12 5 mg at 02/01/21 2224    ondansetron (ZOFRAN) injection 4 mg, 4 mg, Intravenous, Q6H PRN, EL Roth    potassium chloride (K-DUR,KLOR-CON) CR tablet 20 mEq, 20 mEq, Oral, BID, Tiera Mcadams PA-C, 20 mEq at 02/01/21 1818    sevelamer (RENAGEL) tablet 800 mg, 800 mg, Oral, TID With Meals, Chantale Torres DO, 800 mg at 02/02/21 7431    Insert peripheral IV, , , Once **AND** sodium chloride (PF) 0 9 % injection 3 mL, 3 mL, Intravenous, Q1H PRN, Char M Britni, CRNP    traZODone (DESYREL) tablet 25 mg, 25 mg, Oral, HS, Char M EL Ryder, 25 mg at 02/01/21 2223      Labs & Results:    Results from last 7 days   Lab Units 01/26/21  1418   TROPONIN I ng/mL <0 02     Results from last 7 days   Lab Units 01/29/21  0521 01/28/21  0541 01/27/21  0504   WBC Thousand/uL 5 03 5 53 5 06   HEMOGLOBIN g/dL 8 2* 8 3* 8 3*   HEMATOCRIT % 26 1* 26 2* 26 5*   PLATELETS Thousands/uL 99* 100* 102*         Results from last 7 days   Lab Units 02/02/21  0704 02/01/21  0512 01/31/21  0444  01/27/21  0504   POTASSIUM mmol/L 4 2 4 0 3 5   < > 4 1   CHLORIDE mmol/L 104 106 104   < > 107   CO2 mmol/L 28 31 31   < > 31   BUN mg/dL 121* 119* 122*   < > 115*   CREATININE mg/dL 4 31* 3 91* 3 79*   < > 3 94*   CALCIUM mg/dL 9 5 9 3 9 5   < > 9 0   ALK PHOS U/L  --   --   --   --  79   ALT U/L  --   --   --   --  20   AST U/L  --   --   --   --  26    < > = values in this interval not displayed  Results from last 7 days   Lab Units 02/02/21  0659 01/30/21  0527 01/29/21  0521   INR  1 99* 2 33* 2 63*         Counseling / Coordination of Care  Total floor / unit time spent today 20 minutes  Greater than 50% of total time was spent with the patient and / or family counseling and / or coordination of care  A description of the counseling / coordination of care: 20  Jenelle Arias Laughter
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
## 2021-02-02 NOTE — ASSESSMENT & PLAN NOTE
· Blood pressure on soft side  · Hold norvasc  Continue Lopressor with hold parameters, decreased to 12 5 mg BID secondary to bradycardia    · Monitor

## 2021-02-02 NOTE — ASSESSMENT & PLAN NOTE
Lab Results   Component Value Date    EGFR 12 02/02/2021    EGFR 14 02/01/2021    EGFR 14 01/31/2021    CREATININE 4 31 (H) 02/02/2021    CREATININE 3 91 (H) 02/01/2021    CREATININE 3 79 (H) 01/31/2021   · Baseline creatinine 2-2 9 approximately  4 19 on presentation, 4 31 today  · Hold norvasc 2/2 soft BP  · Nephrology following  Planning for PD  Catheter to be  Inserted tomorrow  Was canceled today  Discussed with Dr Shanita Brown PD training at [facility] and then subsequent initiation  Dr Roman Houston setting this up     · Avoid nephrotoxins and hypotension  · BMP in AM  · Known to Dr Jose Juan Welsh as outpatient

## 2021-02-03 ENCOUNTER — ANESTHESIA (INPATIENT)
Dept: RADIOLOGY | Facility: HOSPITAL | Age: 81
DRG: 981 | End: 2021-02-03
Payer: COMMERCIAL

## 2021-02-03 ENCOUNTER — APPOINTMENT (INPATIENT)
Dept: RADIOLOGY | Facility: HOSPITAL | Age: 81
DRG: 981 | End: 2021-02-03
Payer: COMMERCIAL

## 2021-02-03 ENCOUNTER — ANESTHESIA EVENT (INPATIENT)
Dept: RADIOLOGY | Facility: HOSPITAL | Age: 81
DRG: 981 | End: 2021-02-03
Payer: COMMERCIAL

## 2021-02-03 VITALS — HEART RATE: 87 BPM

## 2021-02-03 PROBLEM — I10 HYPERTENSION: Status: ACTIVE | Noted: 2019-01-09

## 2021-02-03 LAB
ABO GROUP BLD BPU: NORMAL
ABO GROUP BLD BPU: NORMAL
ABO GROUP BLD: NORMAL
ANION GAP SERPL CALCULATED.3IONS-SCNC: 8 MMOL/L (ref 4–13)
BLD GP AB SCN SERPL QL: NEGATIVE
BPU ID: NORMAL
BPU ID: NORMAL
BUN SERPL-MCNC: 129 MG/DL (ref 5–25)
CALCIUM SERPL-MCNC: 9.7 MG/DL (ref 8.3–10.1)
CHLORIDE SERPL-SCNC: 103 MMOL/L (ref 100–108)
CO2 SERPL-SCNC: 28 MMOL/L (ref 21–32)
CREAT SERPL-MCNC: 4.53 MG/DL (ref 0.6–1.3)
GFR SERPL CREATININE-BSD FRML MDRD: 12 ML/MIN/1.73SQ M
GLUCOSE SERPL-MCNC: 74 MG/DL (ref 65–140)
INR PPP: 1.78 (ref 0.84–1.19)
POTASSIUM SERPL-SCNC: 4.6 MMOL/L (ref 3.5–5.3)
PROTHROMBIN TIME: 20.6 SECONDS (ref 11.6–14.5)
RH BLD: POSITIVE
SODIUM SERPL-SCNC: 139 MMOL/L (ref 136–145)
SPECIMEN EXPIRATION DATE: NORMAL
UNIT DISPENSE STATUS: NORMAL
UNIT DISPENSE STATUS: NORMAL
UNIT PRODUCT CODE: NORMAL
UNIT PRODUCT CODE: NORMAL
UNIT RH: NORMAL
UNIT RH: NORMAL

## 2021-02-03 PROCEDURE — 99232 SBSQ HOSP IP/OBS MODERATE 35: CPT | Performed by: INTERNAL MEDICINE

## 2021-02-03 PROCEDURE — 99233 SBSQ HOSP IP/OBS HIGH 50: CPT | Performed by: INTERNAL MEDICINE

## 2021-02-03 PROCEDURE — 85610 PROTHROMBIN TIME: CPT | Performed by: PHYSICIAN ASSISTANT

## 2021-02-03 PROCEDURE — 49418 INSERT TUN IP CATH PERC: CPT

## 2021-02-03 PROCEDURE — 3E1M39Z IRRIGATION OF PERITONEAL CAVITY USING DIALYSATE, PERCUTANEOUS APPROACH: ICD-10-PCS | Performed by: INTERNAL MEDICINE

## 2021-02-03 PROCEDURE — 49418 INSERT TUN IP CATH PERC: CPT | Performed by: RADIOLOGY

## 2021-02-03 PROCEDURE — 80048 BASIC METABOLIC PNL TOTAL CA: CPT | Performed by: NURSE PRACTITIONER

## 2021-02-03 PROCEDURE — C1894 INTRO/SHEATH, NON-LASER: HCPCS

## 2021-02-03 PROCEDURE — C1769 GUIDE WIRE: HCPCS

## 2021-02-03 PROCEDURE — 86900 BLOOD TYPING SEROLOGIC ABO: CPT | Performed by: PHYSICIAN ASSISTANT

## 2021-02-03 PROCEDURE — 86850 RBC ANTIBODY SCREEN: CPT | Performed by: PHYSICIAN ASSISTANT

## 2021-02-03 PROCEDURE — 86901 BLOOD TYPING SEROLOGIC RH(D): CPT | Performed by: PHYSICIAN ASSISTANT

## 2021-02-03 PROCEDURE — 0WHG33Z INSERTION OF INFUSION DEVICE INTO PERITONEAL CAVITY, PERCUTANEOUS APPROACH: ICD-10-PCS | Performed by: RADIOLOGY

## 2021-02-03 RX ORDER — LIDOCAINE HYDROCHLORIDE 10 MG/ML
INJECTION, SOLUTION EPIDURAL; INFILTRATION; INTRACAUDAL; PERINEURAL AS NEEDED
Status: DISCONTINUED | OUTPATIENT
Start: 2021-02-03 | End: 2021-02-03

## 2021-02-03 RX ORDER — HYDROMORPHONE HCL/PF 1 MG/ML
0.5 SYRINGE (ML) INJECTION EVERY 2 HOUR PRN
Status: DISCONTINUED | OUTPATIENT
Start: 2021-02-03 | End: 2021-02-05

## 2021-02-03 RX ORDER — OXYCODONE HYDROCHLORIDE 10 MG/1
10 TABLET ORAL EVERY 4 HOURS PRN
Status: DISCONTINUED | OUTPATIENT
Start: 2021-02-03 | End: 2021-02-17 | Stop reason: HOSPADM

## 2021-02-03 RX ORDER — CEFAZOLIN SODIUM 1 G/3ML
INJECTION, POWDER, FOR SOLUTION INTRAMUSCULAR; INTRAVENOUS AS NEEDED
Status: DISCONTINUED | OUTPATIENT
Start: 2021-02-03 | End: 2021-02-03

## 2021-02-03 RX ORDER — SODIUM CHLORIDE 9 MG/ML
INJECTION, SOLUTION INTRAVENOUS CONTINUOUS PRN
Status: DISCONTINUED | OUTPATIENT
Start: 2021-02-03 | End: 2021-02-03

## 2021-02-03 RX ORDER — BUMETANIDE 2 MG/1
4 TABLET ORAL 2 TIMES DAILY
Qty: 60 TABLET | Refills: 3 | Status: SHIPPED | OUTPATIENT
Start: 2021-02-03 | End: 2021-03-31

## 2021-02-03 RX ORDER — OXYCODONE HYDROCHLORIDE AND ACETAMINOPHEN 5; 325 MG/1; MG/1
1 TABLET ORAL EVERY 4 HOURS PRN
Qty: 30 TABLET | Refills: 0 | Status: SHIPPED | OUTPATIENT
Start: 2021-02-03 | End: 2021-02-13

## 2021-02-03 RX ORDER — OXYCODONE HYDROCHLORIDE 5 MG/1
5 TABLET ORAL EVERY 4 HOURS PRN
Status: DISCONTINUED | OUTPATIENT
Start: 2021-02-03 | End: 2021-02-17 | Stop reason: HOSPADM

## 2021-02-03 RX ORDER — FENTANYL CITRATE 50 UG/ML
INJECTION, SOLUTION INTRAMUSCULAR; INTRAVENOUS AS NEEDED
Status: DISCONTINUED | OUTPATIENT
Start: 2021-02-03 | End: 2021-02-03

## 2021-02-03 RX ORDER — ONDANSETRON 2 MG/ML
INJECTION INTRAMUSCULAR; INTRAVENOUS AS NEEDED
Status: DISCONTINUED | OUTPATIENT
Start: 2021-02-03 | End: 2021-02-03

## 2021-02-03 RX ORDER — PROPOFOL 10 MG/ML
INJECTION, EMULSION INTRAVENOUS CONTINUOUS PRN
Status: DISCONTINUED | OUTPATIENT
Start: 2021-02-03 | End: 2021-02-03

## 2021-02-03 RX ORDER — EPHEDRINE SULFATE 50 MG/ML
INJECTION INTRAVENOUS AS NEEDED
Status: DISCONTINUED | OUTPATIENT
Start: 2021-02-03 | End: 2021-02-03

## 2021-02-03 RX ORDER — DOCUSATE SODIUM 100 MG/1
100 CAPSULE, LIQUID FILLED ORAL 2 TIMES DAILY
Qty: 30 CAPSULE | Refills: 1 | Status: SHIPPED | OUTPATIENT
Start: 2021-02-03 | End: 2021-02-25

## 2021-02-03 RX ADMIN — IOHEXOL 80 ML: 350 INJECTION, SOLUTION INTRAVENOUS at 15:08

## 2021-02-03 RX ADMIN — ONDANSETRON 4 MG: 2 INJECTION INTRAMUSCULAR; INTRAVENOUS at 15:08

## 2021-02-03 RX ADMIN — POTASSIUM CHLORIDE 20 MEQ: 1500 TABLET, EXTENDED RELEASE ORAL at 08:49

## 2021-02-03 RX ADMIN — EPHEDRINE SULFATE 5 MG: 50 INJECTION, SOLUTION INTRAVENOUS at 14:02

## 2021-02-03 RX ADMIN — MELATONIN TAB 3 MG 6 MG: 3 TAB at 21:29

## 2021-02-03 RX ADMIN — CEFAZOLIN 1000 MG: 1 INJECTION, POWDER, FOR SOLUTION INTRAVENOUS at 13:17

## 2021-02-03 RX ADMIN — ATORVASTATIN CALCIUM 40 MG: 40 TABLET, FILM COATED ORAL at 08:49

## 2021-02-03 RX ADMIN — FENTANYL CITRATE 25 MCG: 50 INJECTION INTRAMUSCULAR; INTRAVENOUS at 15:04

## 2021-02-03 RX ADMIN — EPHEDRINE SULFATE 10 MG: 50 INJECTION, SOLUTION INTRAVENOUS at 13:32

## 2021-02-03 RX ADMIN — FENTANYL CITRATE 25 MCG: 50 INJECTION INTRAMUSCULAR; INTRAVENOUS at 13:56

## 2021-02-03 RX ADMIN — EPHEDRINE SULFATE 5 MG: 50 INJECTION, SOLUTION INTRAVENOUS at 15:04

## 2021-02-03 RX ADMIN — PROPOFOL 120 MCG/KG/MIN: 10 INJECTION, EMULSION INTRAVENOUS at 13:17

## 2021-02-03 RX ADMIN — SEVELAMER HYDROCHLORIDE 800 MG: 800 TABLET, FILM COATED PARENTERAL at 17:29

## 2021-02-03 RX ADMIN — PHENYLEPHRINE HYDROCHLORIDE 30 MCG/MIN: 10 INJECTION INTRAVENOUS at 13:33

## 2021-02-03 RX ADMIN — SODIUM CHLORIDE: 9 INJECTION, SOLUTION INTRAVENOUS at 13:06

## 2021-02-03 RX ADMIN — TRAZODONE HYDROCHLORIDE 25 MG: 50 TABLET ORAL at 21:30

## 2021-02-03 RX ADMIN — FENTANYL CITRATE 25 MCG: 50 INJECTION INTRAMUSCULAR; INTRAVENOUS at 14:52

## 2021-02-03 RX ADMIN — LIDOCAINE HYDROCHLORIDE 50 MG: 10 INJECTION, SOLUTION EPIDURAL; INFILTRATION; INTRACAUDAL; PERINEURAL at 13:17

## 2021-02-03 RX ADMIN — FENTANYL CITRATE 25 MCG: 50 INJECTION INTRAMUSCULAR; INTRAVENOUS at 14:17

## 2021-02-03 NOTE — ANESTHESIA POSTPROCEDURE EVALUATION
Post-Op Assessment Note    No complications documented      BP 96/56 (02/03/21 1539)    Temp      Pulse 77 (02/03/21 1539)   Resp 14 (02/03/21 1539)    SpO2 97 % (02/03/21 1539)

## 2021-02-03 NOTE — ASSESSMENT & PLAN NOTE
Wt Readings from Last 3 Encounters:   02/02/21 76 7 kg (169 lb 1 5 oz)   01/19/21 80 3 kg (177 lb)   01/11/21 78 9 kg (174 lb)   Presented with 15 lb weight gain in one month, GONZALES, worsening lower extremity edema  Outpatient cardiology team ordered extra dose of diuretic however no improvement  Patient was noted to have AFSHIN on outpatient labs and referred to ED  · Outpatient diuretic regimen bumex 4 mg PO BID with k 20 BID and metolazone 5 mg  2 x weekly  · Heart failure following, was on Bumex drip for several days  Discontinued 2/1  · Per my d/w HF: okay to resume PO diuretics (bumex 4 mg BID) starting this evening  · Last echo 10/2020 showed preserved EF  Mild MR  Severe TR    · I/O, daily weights, low NA diet with FR  · Monitor volume status--improving

## 2021-02-03 NOTE — PLAN OF CARE
Problem: Potential for Falls  Goal: Patient will remain free of falls  Description: INTERVENTIONS:  - Assess patient frequently for physical needs  -  Identify cognitive and physical deficits and behaviors that affect risk of falls    -  [place] fall precautions as indicated by assessment   - Educate patient/family on patient safety including physical limitations  - Instruct patient to call for assistance with activity based on assessment  - Modify environment to reduce risk of injury  - Consider OT/PT consult to assist with strengthening/mobility  Outcome: Progressing     Problem: CARDIOVASCULAR - ADULT  Goal: Maintains optimal cardiac output and hemodynamic stability  Description: INTERVENTIONS:  - Monitor I/O, vital signs and rhythm  - Monitor for S/S and trends of decreased cardiac output  - Administer and titrate ordered vasoactive medications to optimize hemodynamic stability  - Assess quality of pulses, skin color and temperature  - Assess for signs of decreased coronary artery perfusion  - Instruct patient to report change in severity of symptoms  Outcome: Progressing     Problem: METABOLIC, FLUID AND ELECTROLYTES - ADULT  Goal: Electrolytes maintained within normal limits  Description: INTERVENTIONS:  - Monitor labs and assess patient for signs and symptoms of electrolyte imbalances  - Administer electrolyte replacement as ordered  - Monitor response to electrolyte replacements, including repeat lab results as appropriate  - Instruct patient on fluid and nutrition as appropriate  Outcome: Progressing  Goal: Fluid balance maintained  Description: INTERVENTIONS:  - Monitor labs   - Monitor I/O and WT  - Instruct patient on fluid and nutrition as appropriate  - Assess for signs & symptoms of volume excess or deficit  Outcome: Progressing     Problem: MUSCULOSKELETAL - ADULT  Goal: Maintain or return mobility to safest level of function  Description: INTERVENTIONS:  - Assess patient's ability to carry out ADLs; assess patient's baseline for ADL function and identify physical deficits which impact ability to perform ADLs (bathing, care of mouth/teeth, toileting, grooming, dressing, etc )  - Assess/evaluate cause of self-care deficits   - Assess range of motion  - Assess patient's mobility  - Assess patient's need for assistive devices and provide as appropriate  - Encourage maximum independence but intervene and supervise when necessary  - Involve family in performance of ADLs  - Assess for home care needs following discharge   - Consider OT consult to assist with ADL evaluation and planning for discharge  - Provide patient education as appropriate  Outcome: Progressing     Problem: Nutrition/Hydration-ADULT  Goal: Nutrient/Hydration intake appropriate for improving, restoring or maintaining nutritional needs  Description: Monitor and assess patient's nutrition/hydration status for malnutrition  Collaborate with interdisciplinary team and initiate plan and interventions as ordered  Monitor patient's weight and dietary intake as ordered or per policy  Utilize nutrition screening tool and intervene as necessary  Determine patient's food preferences and provide high-protein, high-caloric foods as appropriate       INTERVENTIONS:  - Monitor oral intake, urinary output, labs, and treatment plans  - Assess nutrition and hydration status and recommend course of action  - Evaluate amount of meals eaten  - Assist patient with eating if necessary   - Allow adequate time for meals  - Recommend/ encourage appropriate diets, oral nutritional supplements, and vitamin/mineral supplements  - Order, calculate, and assess calorie counts as needed  - Recommend, monitor, and adjust tube feedings and TPN/PPN based on assessed needs  - Assess need for intravenous fluids  - Provide specific nutrition/hydration education as appropriate  - Include patient/family/caregiver in decisions related to nutrition  Outcome: Progressing     Problem: PAIN - ADULT  Goal: Verbalizes/displays adequate comfort level or baseline comfort level  Description: Interventions:  - Encourage patient to monitor pain and request assistance  - Assess pain using appropriate pain scale  - Administer analgesics based on type and severity of pain and evaluate response  - Implement non-pharmacological measures as appropriate and evaluate response  - Consider cultural and social influences on pain and pain management  - Notify physician/advanced practitioner if interventions unsuccessful or patient reports new pain  Outcome: Progressing     Problem: INFECTION - ADULT  Goal: Absence or prevention of progression during hospitalization  Description: INTERVENTIONS:  - Assess and monitor for signs and symptoms of infection  - Monitor lab/diagnostic results  - Monitor all insertion sites, i e  indwelling lines, tubes, and drains  - Monitor endotracheal if appropriate and nasal secretions for changes in amount and color  - [place] appropriate cooling/warming therapies per order  - Administer medications as ordered  - Instruct and encourage patient and family to use good hand hygiene technique  - Identify and instruct in appropriate isolation precautions for identified infection/condition  Outcome: Progressing     Problem: SAFETY ADULT  Goal: Patient will remain free of falls  Description: INTERVENTIONS:  - Assess patient frequently for physical needs  -  Identify cognitive and physical deficits and behaviors that affect risk of falls    -  [place] fall precautions as indicated by assessment   - Educate patient/family on patient safety including physical limitations  - Instruct patient to call for assistance with activity based on assessment  - Modify environment to reduce risk of injury  - Consider OT/PT consult to assist with strengthening/mobility  Outcome: Progressing  Goal: Maintain or return to baseline ADL function  Description: INTERVENTIONS:  -  Assess patient's ability to carry out ADLs; assess patient's baseline for ADL function and identify physical deficits which impact ability to perform ADLs (bathing, care of mouth/teeth, toileting, grooming, dressing, etc )  - Assess/evaluate cause of self-care deficits   - Assess range of motion  - Assess patient's mobility; develop plan if impaired  - Assess patient's need for assistive devices and provide as appropriate  - Encourage maximum independence but intervene and supervise when necessary  - Involve family in performance of ADLs  - Assess for home care needs following discharge   - Consider OT consult to assist with ADL evaluation and planning for discharge  - Provide patient education as appropriate  Outcome: Progressing  Goal: Maintain or return mobility status to optimal level  Description: INTERVENTIONS:  - Assess patient's baseline mobility status (ambulation, transfers, stairs, etc )    - Identify cognitive and physical deficits and behaviors that affect mobility  - Identify mobility aids required to assist with transfers and/or ambulation (gait belt, sit-to-stand, lift, walker, cane, etc )  - [place] fall precautions as indicated by assessment  - Record patient progress and toleration of activity level on Mobility SBAR; progress patient to next Phase/Stage  - Instruct patient to call for assistance with activity based on assessment  - Consider rehabilitation consult to assist with strengthening/weightbearing, etc   Outcome: Progressing     Problem: DISCHARGE PLANNING  Goal: Discharge to home or other facility with appropriate resources  Description: INTERVENTIONS:  - Identify barriers to discharge w/patient and caregiver  - Arrange for needed discharge resources and transportation as appropriate  - Identify discharge learning needs (meds, wound care, etc )  - Arrange for interpretive services to assist at discharge as needed  - Refer to Case Management Department for coordinating discharge planning if the patient needs post-hospital services based on physician/advanced practitioner order or complex needs related to functional status, cognitive ability, or social support system  Outcome: Progressing     Problem: Knowledge Deficit  Goal: Patient/family/caregiver demonstrates understanding of disease process, treatment plan, medications, and discharge instructions  Description: Complete learning assessment and assess knowledge base    Interventions:  - Provide teaching at level of understanding  - Provide teaching via preferred learning methods  Outcome: Progressing

## 2021-02-03 NOTE — ANESTHESIA PREPROCEDURE EVALUATION
Procedure:  IR PERITONEAL DIALYSIS CATHETER PLACEMENT    Relevant Problems   CARDIO   (+) Atrial fibrillation (HCC)   (+) Benign essential hypertension   (+) GONZALES (dyspnea on exertion)   (+) Dissection of thoracic aorta (HCC)   (+) Hyperlipidemia   (+) Hypertension   (+) MI (myocardial infarction) (HCC)   (+) Paroxysmal atrial fibrillation (HCC)   (+) SOBOE (shortness of breath on exertion)      ENDO   (+) 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 (HCC)      /RENAL   (+) Acute renal failure superimposed on stage 4 chronic kidney disease (HCC)   (+) Benign prostatic hyperplasia without lower urinary tract symptoms   (+) Chronic kidney disease-mineral and bone disorder   (+) Stage 4 chronic kidney disease (HCC)      HEMATOLOGY   (+) Anemia      MUSCULOSKELETAL   (+) Lumbar pain   (+) Rheumatoid arthritis (HCC)   (+) Secondary osteoarthritis of right shoulder      PULMONARY   (+) GONZALES (dyspnea on exertion)   (+) SOBOE (shortness of breath on exertion)      Other   (+) Bradycardia        Physical Exam    Airway    Mallampati score: II  TM Distance: >3 FB  Neck ROM: full     Dental   upper dentures and lower dentures,     Cardiovascular  Cardiovascular exam normal    Pulmonary  Pulmonary exam normal     Other Findings  Sinus bradycardia  T wave abnormality, consider inferolateral ischemia  Abnormal ECG  When compared with ECG of 04-OCT-2020 15:00,  No significant change was found         Anesthesia Plan  ASA Score- 4     Anesthesia Type- IV sedation with anesthesia with ASA Monitors  Additional Monitors:   Airway Plan:           Plan Factors-Exercise tolerance (METS): <4 METS  Chart reviewed  EKG reviewed  Existing labs reviewed  Patient is not a current smoker  Patient not instructed to abstain from smoking on day of procedure  Patient did not smoke on day of surgery  Induction- intravenous  Postoperative Plan-     Informed Consent- Anesthetic plan and risks discussed with patient    I personally reviewed this patient with the CRNA  Discussed and agreed on the Anesthesia Plan with the CRNA  Margoth Bales

## 2021-02-03 NOTE — PROGRESS NOTES
Progress Note - Oliver SEBASTIAN Core 1940, 68 y o  male MRN: [id number]    Unit/Bed#: CW2 213-01 Encounter: [id number]    Primary Care Provider: EL Machuca   Date and time admitted to hospital: 1/26/2021  1:24 PM    * Acute on chronic diastolic (congestive) heart failure (HCC)  Assessment & Plan  Wt Readings from Last 3 Encounters:   02/02/21 76 7 kg (169 lb 1 5 oz)   01/19/21 80 3 kg (177 lb)   01/11/21 78 9 kg (174 lb)   Presented with 15 lb weight gain in one month, GONZALES, worsening lower extremity edema  Outpatient cardiology team ordered extra dose of diuretic however no improvement  Patient was noted to have AFSHIN on outpatient labs and referred to ED  · Outpatient diuretic regimen bumex 4 mg PO BID with k 20 BID and metolazone 5 mg  2 x weekly  · Heart failure following, was on Bumex drip for several days  Discontinued 2/1  · Per my d/w HF: okay to resume PO diuretics (bumex 4 mg BID) starting this evening  · Last echo 10/2020 showed preserved EF  Mild MR  Severe TR    · I/O, daily weights, low NA diet with FR  · Monitor volume status--improving  Stage 4 chronic kidney disease [facility])  Assessment & Plan  Lab Results   Component Value Date    EGFR 12 02/03/2021    EGFR 12 02/02/2021    EGFR 14 02/01/2021    CREATININE 4 53 (H) 02/03/2021    CREATININE 4 31 (H) 02/02/2021    CREATININE 3 91 (H) 02/01/2021   · Baseline creatinine 2-2 9 approximately  4 19 on presentation, 4 53 today  · Holding norvasc 2/2 soft BP  · Nephrology following  Planning for PD  Catheter to be inserted today  Planning PD training at [address] and then subsequent initiation  Dr Mindy Norton setting this up  · Avoid nephrotoxins and hypotension  · Known to Dr Rob Nava as outpatient    Insomnia  Assessment & Plan  · Continue melatonin  · Continue Trazodone HS    Atrial fibrillation ([address] )  Assessment & Plan  · Rate controlled on Lopressor  · Anticoagulated on Coumadin  Initially held secondary to supratherapeutic INR      · Resumed  but now on hold again for PD catheter placement which was canceled for  and rescheduled for today  · INR in AM  · Resume warfarin when cleared to do so    Anemia  Assessment & Plan  · Chronic, stable at baseline  · Received IV iron per nephro    Hyperlipidemia  Assessment & Plan  · Continue statin    Dissection of thoracic aorta [facility])  Assessment & Plan  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 · Continue telemetry    Benign essential hypertension  Assessment & Plan  Blood pressure on soft side  · Holding norvasc  · Lopressor on hold  · Monitor        VTE Pharmacologic Prophylaxis:   Pharmacologic: warfarin on hold currently   Mechanical VTE Prophylaxis in Place: Yes    Patient Centered Rounds: I have performed bedside rounds with nursing staff today  Discussions with Specialists or Other Care Team Provider: appreciate HF and renal    Education and Discussions with Family / Patient: patient  Left VM for daughter  Time Spent for Care: 30 minutes  More than 50% of total time spent on counseling and coordination of care as described above  Current Length of Stay: 8 day(s)    Current Patient Status: Inpatient   Certification Statement: The patient will continue to require additional inpatient hospital stay due to ongoing medical tx    Discharge Plan: pending  Maybe tomorrow    Code Status: Level 1 - Full Code      Subjective:   Doing well today  Ready for procedure      Objective:     Vitals:   Temp (24hrs), Av 3 °F (36 8 °C), Min:98 °F (36 7 °C), Max:98 5 °F (36 9 °C)    Temp:  [98 °F (36 7 °C)-98 5 °F (36 9 °C)] 98 °F (36 7 °C)  HR:  [49-68] 58  Resp:  [16-18] 16  BP: ()/() 111/59  SpO2:  [93 %-100 %] 97 %  Body mass index is 26 48 kg/m²  Input and Output Summary (last 24 hours): Intake/Output Summary (Last 24 hours) at 2/3/2021 1109  Last data filed at 2/3/2021 0720  Gross per 24 hour   Intake 273 33 ml   Output 620 ml   Net -346 67 ml       Physical Exam:     Physical Exam  Vitals signs and nursing note reviewed  Constitutional:       General: He is not in acute distress  Cardiovascular:      Rate and Rhythm: Bradycardia present  Pulmonary:      Effort: No respiratory distress  Abdominal:      General: There is no distension  Tenderness: There is no abdominal tenderness  Musculoskeletal:      Right lower leg: No edema  Left lower leg: No edema  Neurological:      Mental Status: He is oriented to person, place, and time  Psychiatric:         Mood and Affect: Mood normal          Additional Data:     Labs:    Results from last 7 days   Lab Units 01/29/21  0521   WBC Thousand/uL 5 03   HEMOGLOBIN g/dL 8 2*   HEMATOCRIT % 26 1*   PLATELETS Thousands/uL 99*   NEUTROS PCT % 58   LYMPHS PCT % 18   MONOS PCT % 21*   EOS PCT % 3     Results from last 7 days   Lab Units 02/03/21  0545   POTASSIUM mmol/L 4 6   CHLORIDE mmol/L 103   CO2 mmol/L 28   BUN mg/dL 129*   CREATININE mg/dL 4 53*   CALCIUM mg/dL 9 7     Results from last 7 days   Lab Units 02/03/21  0545   INR  1 78*       * I Have Reviewed All Lab Data Listed Above  * Additional Pertinent Lab Tests Reviewed:  All Labs Within Last 24 Hours Reviewed    Imaging:    Imaging Reports Reviewed Today Include: all  Imaging Personally Reviewed by Myself Includes:  none    Recent Cultures (last 7 days):           Last 24 Hours Medication List:   Current Facility-Administered Medications   Medication Dose Route Frequency Provider Last Rate    acetaminophen  650 mg Oral Q6H PRN Char Govea EL      atorvastatin  40 mg Oral Daily EL Roth      melatonin  6 mg Oral HS EL Roth      ondansetron  4 mg Intravenous Q6H PRN EL Roth      potassium chloride  20 mEq Oral BID Madeline Bass PA-C      sevelamer  800 mg Oral TID With Meals Pamela Torres DO      sodium chloride (PF)  3 mL Intravenous Q1H PRN EL Roth      traZODone  25 mg Oral HS EL Holder          Today, Patient Was Seen By: Arsh Oliver PA-C    ** Please Note: Dictation voice to text software may have been used in the creation of this document   **

## 2021-02-03 NOTE — PROGRESS NOTES
NEPHROLOGY PROGRESS NOTE   Todd Garcia [facility] 68 y o  male MRN: [id number]  Unit/Bed#: CW2 213-01 Encounter: [id number]      ASSESSMENT & PLAN     1  AFSHIN (POA) on stage 4 chronic kidney disease with a baseline creatinine in the mid 2s and an estimated GFR in the low 20 now with progressive CKD  ? He has required hemodialysis in the past  ? His creatinine is trending upwards we are holding am dose of diuretics-this may be progression of his ckd-symptomatically stable  ? Order placed for PD catheter placement-platelet count low will require some FFP-scheduled for today  ? Fluid removal as tolerated  ? Fluid restriction-2 L  ? Once PD catheter placed outpatient follow-up for training  ? Check bladder scan     2  Electrolytes/Acid Base  ? Hypokalemia-on kdur 20 meq bid-discontinue for now k 4 6     3  Blood Pressure-hypotension  ? Maps are acceptable  ? Low-dose metoprolol  ? Cardiology follow-up  ? Diuresing-Bumex 4 mg p o  B i d      4  Anemia of CKD  ? Trend hemoglobin  ? Received course of Venofer  ? Thrombocytopenia-platelets pre PD catheter placed     5  CKD-BMD  ? Hyperphosphatemia-sevelemer 800 mg t i d      6  Clinical Course/CV Risk Reduction/Health maintenance/communication  ? Diastolic heart failure with an ejection fraction of 60%  ? 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     SUBJECTIVE:    Patient was seen today  Denies any acute complaints  No chest pain or shortness of breath  No fevers or chills  Tolerating p o   Intake well    OBJECTIVE:  Current Weight: Weight - Scale: 76 7 kg (169 lb 1 5 oz)  @  Vitals:    02/02/21 2040 02/02/21 2349 02/03/21 0542 02/03/21 0706   BP:  103/50 117/64 111/59   BP Location:  Left arm     Pulse:  68 58    Resp:  17  16   Temp:  98 °F (36 7 °C)     TempSrc:  Oral     SpO2: 96% 96% 97%    Weight:       Height:           Intake/Output Summary (Last 24 hours) at 2/3/2021 1119  Last data filed at 2/3/2021 0720  Gross per 24 hour   Intake 273 33 ml   Output 620 ml   Net -346 67 ml     Weight (last 2 days)     Date/Time   Weight    02/02/21 0600   76 7 (169 09)    02/01/21 0538   77 1 (169 9)              General:  Frail and elderly  Eyes: conjunctivae pink, anicteric sclerae  ENT: lips and mucous membranes moist  Neck: supple, no JVD  Chest: no respiratory distress, no accessory muscle use, normal respiratory effort  CVS: normal heart rate, no friction rub  Abdomen:  Distended nontender  Extremities: no edema of both legs  Skin: no rash  Neuro: awake, alert, oriented      Medications:    Current Facility-Administered Medications:     acetaminophen (TYLENOL) tablet 650 mg, 650 mg, Oral, Q6H PRN, EL Roth    atorvastatin (LIPITOR) tablet 40 mg, 40 mg, Oral, Daily, EL Roth, 40 mg at 02/03/21 0849    melatonin tablet 6 mg, 6 mg, Oral, HS, EL Roth, 6 mg at 02/02/21 2102    ondansetron (ZOFRAN) injection 4 mg, 4 mg, Intravenous, Q6H PRN, EL Roth    potassium chloride (K-DUR,KLOR-CON) CR tablet 20 mEq, 20 mEq, Oral, BID, Jame Hampton PA-C, 20 mEq at 02/03/21 0258    sevelamer (RENAGEL) tablet 800 mg, 800 mg, Oral, TID With Meals, Allyn Torres DO, 800 mg at 02/02/21 2102    Insert peripheral IV, , , Once **AND** sodium chloride (PF) 0 9 % injection 3 mL, 3 mL, Intravenous, Q1H PRN, EL Roth    traZODone (DESYREL) tablet 25 mg, 25 mg, Oral, HS, NESSA RothNP, 25 mg at 02/02/21 2102       Lab Results:   Results from last 7 days   Lab Units 02/03/21  0545 02/02/21  0704 02/01/21  0512 01/31/21  0444 01/30/21  0527 01/29/21  0521 01/28/21  0541   WBC Thousand/uL  --   --   --   --   --  5 03 5 53   HEMOGLOBIN g/dL  --   --   --   --   --  8 2* 8 3*   HEMATOCRIT %  --   --   --   --   --  26 1* 26 2*   PLATELETS Thousands/uL  --   --   --   --   --  99* 100*   POTASSIUM mmol/L 4 6 4 2 4 0 3 5 3 3* 3 3* 3 4*   CHLORIDE mmol/L 103 104 106 104 102 104 103   CO2 mmol/L 28 28 31 31 31 30 32   BUN mg/dL 129* 121* 119* 122* 112* 118* 112*   CREATININE mg/dL 4 53* 4 31* 3 91* 3 79* 3 73* 3 97* 4 00*   CALCIUM mg/dL 9 7 9 5 9 3 9 5 9 1 8 9 8 8   MAGNESIUM mg/dL  --   --   --   --   --  2 3  --    PHOSPHORUS mg/dL  --   --   --   --  5 4*  --   --          Portions of the record may have been created with voice recognition software  Occasional wrong word or "sound a like" substitutions may have occurred due to the inherent limitations of voice recognition software  Read the chart carefully and recognize, using context, where substitutions have occurred  If you have any questions, please contact the dictating provider

## 2021-02-03 NOTE — ANESTHESIA POSTPROCEDURE EVALUATION
Post-Op Assessment Note    CV Status:  Stable  Pain Score: 0    Pain management: adequate     Mental Status:  Alert and awake   Hydration Status:  Euvolemic   PONV Controlled:  Controlled   Airway Patency:  Patent      Post Op Vitals Reviewed: Yes      Staff: CRNA         No complications documented      BP   105/53   Temp      Pulse  80   Resp   16   SpO2   98%

## 2021-02-03 NOTE — PROGRESS NOTES
Heart Failure/ Pulmonary Hypertension Progress Note - Oliver Sharpe 68 y o  male MRN: [id number]    Unit/Bed#: CW2 213-01 Encounter: [id number]      Assessment:    Principal Problem:    Acute on chronic diastolic (congestive) heart failure (HCC)  Active Problems:    Benign essential hypertension    Bradycardia    Dissection of thoracic aorta (HCC)    Hyperlipidemia    Anemia    Stage 4 chronic kidney disease (HCC)    Atrial fibrillation (HCC)    Insomnia      Subjective:   Oliver Sharpe is a 71-year-old man with PMH as below who presented to [facility] 01/26/2021 after being contacted by cardiology office to review abnormal renal function  Patient had increased frequency of metolazone dosing and had minimal improvement with no reported weight loss or symptomatic improvement  Case was discussed with Dr Brianne Tinajero and decision was made to have patient proceed to ED for further evaluation  Patient seen and examined  Bradycardia improved with holding BB  Transitioned from Bumex gtt to oral diuretics yesterday  For PD catheter placemen today  Objective: Intake/ Output:868/620/+248  Weight: bed scale ? ?  Standing scale on 2/1 169 lbs  Tele: Atrial fib, 50-60 bpm      Acute on chronic HFpEF - Volume management: Plan for PD catheter placement today  To receive 1 units of plasma pre-procedure  HOld AM diuretics per nephro  Restart oral Bumex 4 mg BID this PM  BP controlled at this point off antihypertensives  Continue to monitor  May need to restart Amlodipine as outpatient    Currently enrolled in [facility] program                      TTE 04/24/2019: LVEF 60%  LVIDd 4 31 cm  IVSd 1 4 cm  Grade 1 DD  Normal RV size and RVSF  Mild TR  PASP 35 mmHg                TTE 10/06/2020: LVEF 60%  LVIDd 4 69 cm  IVSd 1 41 cm  Grade 2 DD  Normal RV size and RVSF  KUNAL  Mild MR  Severe TR                Paroxysmal atrial [id number] = 4 (age, CHF, HTN)  Anticoagulation on Coumadin  Persistently bradycardic this admit into the mid 30's  Trial off Metop for now and monitor  Bradycardia  With rates in the 30-40s seen on telemetry this admit  Improved  Consider further monitoring as outpatient, ie Zio patch or HM to further evaluate   BB d/c yesterday  Continue to monitor response  Hypertension  Well controlled  Amlodipine 5 mg daily currently on hold for soft BP        Hyperlipidemia  FLP 1/26/21: TC 88, TD 69, HDL 42, LDL 32  Continue on atorvastatin 40 mg daily       Chronic kidney disease, stage IV Baseline creatinine was 2 0-2 3, new baseline likely 3 5-4  Transitioned from Bumex gtt to oral regimen yesterday  For PD catheter placement today  Holding AM dose of Bumex    Follows with Dr Michael Carrillo as outpatient  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 with hemiarch reconstruction and aortic valve resuspension with a 26 mm Dacron graft on 12/15/2019 by Dr Kendra Paige               Benign prostatic hyperplasia  History of COVID-19 infection: diagnosed in 07/2020  Review of Systems   All other systems reviewed and are negative  [employer] (day, reason): [place] catheter (day, reason):    Vitals: Blood pressure 111/59, pulse 58, temperature 98 °F (36 7 °C), temperature source Oral, resp  rate 16, height 5' 7" (1 702 m), weight 76 7 kg (169 lb 1 5 oz), SpO2 97 %  , Body mass index is 26 48 kg/m² , I/O last 3 completed shifts: In: 868 3 [P O :180; I V :93 3; Blood:595]  Out: 620 [Urine:620]  No intake/output data recorded  Wt Readings from Last 3 Encounters:   02/02/21 76 7 kg (169 lb 1 5 oz)   01/19/21 80 3 kg (177 lb)   01/11/21 78 9 kg (174 lb)       Intake/Output Summary (Last 24 hours) at 2/3/2021 0831  Last data filed at 2/3/2021 0720  Gross per 24 hour   Intake 868 33 ml   Output 620 ml   Net 248 33 ml     I/O last 3 completed shifts:   In: 868 3 [P O :180; I V :93 3; Blood:595]  Out: 620 [Urine:620]    Atrial fib, 50-60 bpm        Physical Exam:  Vitals:    02/02/21 2040 02/02/21 2349 02/03/21 0542 02/03/21 0706   BP:  103/50 117/64 111/59   BP Location:  Left arm     Pulse:  68 58    Resp:  17  16   Temp:  98 °F (36 7 °C)     TempSrc:  Oral     SpO2: 96% 96% 97%    Weight:       Height:           GEN: Oliver SEBASTIAN Core appears well, alert and oriented x 3, pleasant and cooperative   HEENT: pupils equal, round, and reactive to light; extraocular muscles intact  NECK: supple, no carotid bruits   HEART: regular rhythm, normal S1 and S2, no murmurs, clicks, gallops or rubs, JVP is flat  LUNGS: clear to auscultation bilaterally; no wheezes, rales, or rhonchi   ABDOMEN: normal bowel sounds, soft, no tenderness, no distention  EXTREMITIES: peripheral pulses normal; no clubbing, cyanosis, or edema  NEURO: no focal findings   SKIN: normal without suspicious lesions on exposed skin      Current Facility-Administered Medications:     acetaminophen (TYLENOL) tablet 650 mg, 650 mg, Oral, Q6H PRN, EL Roth    atorvastatin (LIPITOR) tablet 40 mg, 40 mg, Oral, Daily, EL Roth, 40 mg at 02/02/21 0915    melatonin tablet 6 mg, 6 mg, Oral, HS, EL Roth, 6 mg at 02/02/21 2102    ondansetron (ZOFRAN) injection 4 mg, 4 mg, Intravenous, Q6H PRN, EL Roth    potassium chloride (K-DUR,KLOR-CON) CR tablet 20 mEq, 20 mEq, Oral, BID, Lily Saini PA-C, 20 mEq at 02/02/21 1724    sevelamer (RENAGEL) tablet 800 mg, 800 mg, Oral, TID With Meals, Mary Torres DO, 800 mg at 02/02/21 2102    Insert peripheral IV, , , Once **AND** sodium chloride (PF) 0 9 % injection 3 mL, 3 mL, Intravenous, Q1H PRN, EL Roth    traZODone (DESYREL) tablet 25 mg, 25 mg, Oral, HS, NESSA RothNP, 25 mg at 02/02/21 2102      Labs & Results:        Results from last 7 days   Lab Units 01/29/21  0521 01/28/21  0541   WBC Thousand/uL 5 03 5 53   HEMOGLOBIN g/dL 8 2* 8 3*   HEMATOCRIT % 26 1* 26 2*   PLATELETS Thousands/uL 99* 100*         Results from last 7 days   Lab Units 02/03/21  0545 02/02/21  0704 02/01/21  0512   POTASSIUM mmol/L 4 6 4 2 4 0   CHLORIDE mmol/L 103 104 106   CO2 mmol/L 28 28 31   BUN mg/dL 129* 121* 119*   CREATININE mg/dL 4 53* 4 31* 3 91*   CALCIUM mg/dL 9 7 9 5 9 3     Results from last 7 days   Lab Units 02/03/21  0545 02/02/21  0659 01/30/21  0527   INR  1 78* 1 99* 2 33*         Counseling / Coordination of Care  Total floor / unit time spent today 20 minutes  Greater than 50% of total time was spent with the patient and / or family counseling and / or coordination of care  A description of the counseling / coordination of care: 20  Jenelle Baer

## 2021-02-03 NOTE — ASSESSMENT & PLAN NOTE
· Rate controlled on Lopressor  · Anticoagulated on Coumadin  Initially held secondary to supratherapeutic INR      · Resumed 1/28 but now on hold again for PD catheter placement which was canceled for 2/2 and rescheduled for today  · INR in AM  · Resume warfarin when cleared to do so

## 2021-02-03 NOTE — SEDATION DOCUMENTATION
Tiger Text with Soto Monteiro, she will take care of HIV consent later today after patient recovers from anesthesia  

## 2021-02-03 NOTE — ASSESSMENT & PLAN NOTE
· Also with 2 second pause noted  · Lopressor dose decreased and then now held  · Asymptomatic  · Appreciate cardiology recommendations, possible Zio patch?   · Continue telemetry

## 2021-02-03 NOTE — BRIEF OP NOTE (RAD/CATH)
INTERVENTIONAL RADIOLOGY PROCEDURE NOTE    Date: 2/3/2021    Procedure: IR PERITONEAL DIALYSIS CATHETER PLACEMENT     Preoperative diagnosis:   1  Dyspnea    2  AFSHIN (acute kidney injury) ([facility] [address] )    3  Lower extremity edema    4  Acute diastolic CHF (congestive heart failure) ([facility])    5  Stage 4 chronic kidney disease ([facility] [address] )    6  Acute on chronic diastolic (congestive) heart failure (HCC)         Postoperative diagnosis: Same  Surgeon: Shahriar Baldwin MD     Assistant: None  No qualified resident was available  Blood loss: minimal    Specimens: none    Findings: Successful PD catheter placement  May use immediately if needed for urgent start or in 1 week  Complications: None immediate      Anesthesia: MAC sedation

## 2021-02-03 NOTE — ASSESSMENT & PLAN NOTE
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

## 2021-02-03 NOTE — CASE MANAGEMENT
ANTONIO called and spoke with iVola Tatum from [facility] who informed that she will fax CM a form that lists what documentation is required for pt to be signed to their services  Awaiting fax, ANTONIO to follow

## 2021-02-03 NOTE — SEDATION DOCUMENTATION
Patient tolerated procedure without any immediate complications  Patient denies pain at this time  Anesthesia remains with patient

## 2021-02-04 ENCOUNTER — APPOINTMENT (INPATIENT)
Dept: RADIOLOGY | Facility: HOSPITAL | Age: 81
DRG: 981 | End: 2021-02-04
Payer: COMMERCIAL

## 2021-02-04 LAB
ANION GAP SERPL CALCULATED.3IONS-SCNC: 9 MMOL/L (ref 4–13)
BASOPHILS # BLD AUTO: 0.01 THOUSANDS/ΜL (ref 0–0.1)
BASOPHILS NFR BLD AUTO: 0 % (ref 0–1)
BUN SERPL-MCNC: 129 MG/DL (ref 5–25)
CALCIUM SERPL-MCNC: 9.1 MG/DL (ref 8.3–10.1)
CHLORIDE SERPL-SCNC: 103 MMOL/L (ref 100–108)
CO2 SERPL-SCNC: 27 MMOL/L (ref 21–32)
CREAT SERPL-MCNC: 4.86 MG/DL (ref 0.6–1.3)
EOSINOPHIL # BLD AUTO: 0.11 THOUSAND/ΜL (ref 0–0.61)
EOSINOPHIL NFR BLD AUTO: 2 % (ref 0–6)
ERYTHROCYTE [DISTWIDTH] IN BLOOD BY AUTOMATED COUNT: 15.9 % (ref 11.6–15.1)
GFR SERPL CREATININE-BSD FRML MDRD: 11 ML/MIN/1.73SQ M
GLUCOSE SERPL-MCNC: 107 MG/DL (ref 65–140)
HCT VFR BLD AUTO: 26.1 % (ref 36.5–49.3)
HGB BLD-MCNC: 8.2 G/DL (ref 12–17)
HIV 1+2 AB+HIV1 P24 AG SERPL QL IA: NORMAL
HIV1 P24 AG SER QL: NORMAL
IMM GRANULOCYTES # BLD AUTO: 0.03 THOUSAND/UL (ref 0–0.2)
IMM GRANULOCYTES NFR BLD AUTO: 1 % (ref 0–2)
INR PPP: 1.86 (ref 0.84–1.19)
LYMPHOCYTES # BLD AUTO: 0.68 THOUSANDS/ΜL (ref 0.6–4.47)
LYMPHOCYTES NFR BLD AUTO: 12 % (ref 14–44)
MCH RBC QN AUTO: 30.3 PG (ref 26.8–34.3)
MCHC RBC AUTO-ENTMCNC: 31.4 G/DL (ref 31.4–37.4)
MCV RBC AUTO: 96 FL (ref 82–98)
MONOCYTES # BLD AUTO: 0.92 THOUSAND/ΜL (ref 0.17–1.22)
MONOCYTES NFR BLD AUTO: 16 % (ref 4–12)
NEUTROPHILS # BLD AUTO: 4.06 THOUSANDS/ΜL (ref 1.85–7.62)
NEUTS SEG NFR BLD AUTO: 69 % (ref 43–75)
NRBC BLD AUTO-RTO: 0 /100 WBCS
PLATELET # BLD AUTO: 88 THOUSANDS/UL (ref 149–390)
PMV BLD AUTO: 11.5 FL (ref 8.9–12.7)
POTASSIUM SERPL-SCNC: 4.3 MMOL/L (ref 3.5–5.3)
PROTHROMBIN TIME: 21.3 SECONDS (ref 11.6–14.5)
RBC # BLD AUTO: 2.71 MILLION/UL (ref 3.88–5.62)
SODIUM SERPL-SCNC: 139 MMOL/L (ref 136–145)
WBC # BLD AUTO: 5.81 THOUSAND/UL (ref 4.31–10.16)

## 2021-02-04 PROCEDURE — 99232 SBSQ HOSP IP/OBS MODERATE 35: CPT | Performed by: INTERNAL MEDICINE

## 2021-02-04 PROCEDURE — 85610 PROTHROMBIN TIME: CPT | Performed by: INTERNAL MEDICINE

## 2021-02-04 PROCEDURE — 99233 SBSQ HOSP IP/OBS HIGH 50: CPT | Performed by: INTERNAL MEDICINE

## 2021-02-04 PROCEDURE — 3E1M39Z IRRIGATION OF PERITONEAL CAVITY USING DIALYSATE, PERCUTANEOUS APPROACH: ICD-10-PCS | Performed by: INTERNAL MEDICINE

## 2021-02-04 PROCEDURE — NC001 PR NO CHARGE: Performed by: NURSE PRACTITIONER

## 2021-02-04 PROCEDURE — 71045 X-RAY EXAM CHEST 1 VIEW: CPT

## 2021-02-04 PROCEDURE — 87806 HIV AG W/HIV1&2 ANTB W/OPTIC: CPT | Performed by: INTERNAL MEDICINE

## 2021-02-04 PROCEDURE — 80048 BASIC METABOLIC PNL TOTAL CA: CPT | Performed by: INTERNAL MEDICINE

## 2021-02-04 PROCEDURE — 85025 COMPLETE CBC W/AUTO DIFF WBC: CPT | Performed by: INTERNAL MEDICINE

## 2021-02-04 RX ORDER — BUMETANIDE 2 MG/1
4 TABLET ORAL
Status: DISCONTINUED | OUTPATIENT
Start: 2021-02-04 | End: 2021-02-04

## 2021-02-04 RX ORDER — BENZONATATE 100 MG/1
100 CAPSULE ORAL 3 TIMES DAILY PRN
Status: DISCONTINUED | OUTPATIENT
Start: 2021-02-04 | End: 2021-02-17 | Stop reason: HOSPADM

## 2021-02-04 RX ORDER — WARFARIN SODIUM 2 MG/1
2 TABLET ORAL
Status: DISCONTINUED | OUTPATIENT
Start: 2021-02-04 | End: 2021-02-08

## 2021-02-04 RX ORDER — BUMETANIDE 0.25 MG/ML
4 INJECTION, SOLUTION INTRAMUSCULAR; INTRAVENOUS 2 TIMES DAILY
Status: DISCONTINUED | OUTPATIENT
Start: 2021-02-04 | End: 2021-02-06

## 2021-02-04 RX ORDER — GUAIFENESIN 600 MG
600 TABLET, EXTENDED RELEASE 12 HR ORAL EVERY 12 HOURS SCHEDULED
Status: DISCONTINUED | OUTPATIENT
Start: 2021-02-04 | End: 2021-02-17 | Stop reason: HOSPADM

## 2021-02-04 RX ADMIN — OXYCODONE HYDROCHLORIDE 10 MG: 10 TABLET ORAL at 23:25

## 2021-02-04 RX ADMIN — WARFARIN SODIUM 2 MG: 2 TABLET ORAL at 17:21

## 2021-02-04 RX ADMIN — GUAIFENESIN 600 MG: 600 TABLET, EXTENDED RELEASE ORAL at 05:05

## 2021-02-04 RX ADMIN — BUMETANIDE 4 MG: 0.25 INJECTION INTRAMUSCULAR; INTRAVENOUS at 10:10

## 2021-02-04 RX ADMIN — SEVELAMER HYDROCHLORIDE 800 MG: 800 TABLET, FILM COATED PARENTERAL at 17:21

## 2021-02-04 RX ADMIN — MELATONIN TAB 3 MG 6 MG: 3 TAB at 21:14

## 2021-02-04 RX ADMIN — ATORVASTATIN CALCIUM 40 MG: 40 TABLET, FILM COATED ORAL at 10:09

## 2021-02-04 RX ADMIN — SEVELAMER HYDROCHLORIDE 800 MG: 800 TABLET, FILM COATED PARENTERAL at 11:53

## 2021-02-04 RX ADMIN — TRAZODONE HYDROCHLORIDE 25 MG: 50 TABLET ORAL at 21:14

## 2021-02-04 RX ADMIN — GUAIFENESIN 600 MG: 600 TABLET, EXTENDED RELEASE ORAL at 21:14

## 2021-02-04 RX ADMIN — BUMETANIDE 4 MG: 0.25 INJECTION INTRAMUSCULAR; INTRAVENOUS at 17:22

## 2021-02-04 NOTE — PROGRESS NOTES
Progress Note -Interventional Radiology PA  [facility] A Core 68 y o  male MRN: [id number]  Unit/Bed#: CW2 213-01 Encounter: [id number]    Assessment/Plan:    68year old male with AFSHIN on CKD 4 previously requiring HD  Patient had PD catheter placed 2/3 by IR for anticipatory urgent start of PD at home in next few weeks, unfortunately d/t uremic symptoms and volume overload, patient is waiting for a bed on P8 and will start CCPD after he is moved  PD catheter assessment: CDI, patient has no pain, he is eating well, no abdominal pain, tenderness on palpation  No signs of bleeding, hgb stable at 8 2      - ok to use PD catheter  - please reach out to IR for any problems or concerns    Patient Active Problem List   Diagnosis    Rotator cuff tear arthropathy, right    Secondary osteoarthritis of right shoulder    Benign essential hypertension    Abnormal TSH    Overweight (BMI 25 0-29  9)    Lumbar pain    Hypertension    Benign prostatic hyperplasia without lower urinary tract symptoms    Bradycardia    SOBOE (shortness of breath on exertion)    Dissection of thoracic aorta (HCC)    S/P aorta repair    Anticoagulation goal of INR 2 to 3    Hyperphosphatemia    Hypotension    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    Complication of vascular dialysis catheter    Hyperlipidemia    Sleep disorder    Encounter for post surgical wound check    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    Monoclonal gammopathy    Paroxysmal atrial fibrillation (HCC)    GONZALES (dyspnea on exertion)    Acute on chronic diastolic (congestive) heart failure (HCC)    Acute renal failure superimposed on stage 4 chronic kidney disease (HCC)    Anemia    Stage 4 chronic kidney disease (HCC)    Chronic kidney disease-mineral and bone disorder    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 ([address] )    Rheumatoid arthritis ([address] )    Atrial fibrillation (HCC)    Insomnia    MI (myocardial infarction) ([address] ) Subjective: Lela Pierre is a 68 y o  male who presented with GONZALES, weight gain, lower extremity edema  He had a PD catheter placed 2/3 and is feeling well post procedure  He currently has no complaints today  Objective:    Vitals:  BP (!) 94/49   Pulse 59   Temp 98 2 °F (36 8 °C) (Oral)   Resp 18   Ht 5' 7" (1 702 m)   Wt 77 5 kg (170 lb 13 7 oz)   SpO2 94%   BMI 26 76 kg/m²   Body mass index is 26 76 kg/m²  Weight (last 2 days)     Date/Time   Weight    02/04/21 0541   77 5 (170 86)    02/02/21 0600   76 7 (169 09)              I/Os:    Intake/Output Summary (Last 24 hours) at 2/4/2021 1205  Last data filed at 2/4/2021 1145  Gross per 24 hour   Intake 364 ml   Output 175 ml   Net 189 ml       Invasive Devices     Peripheral Intravenous Line            Peripheral IV 02/03/21 Right Hand less than 1 day                Physical Exam:  Physical Exam  Vitals signs and nursing note reviewed  Constitutional:       Appearance: He is well-developed  HENT:      Head: Normocephalic and atraumatic  Eyes:      Conjunctiva/sclera: Conjunctivae normal    Neck:      Musculoskeletal: Neck supple  Cardiovascular:      Rate and Rhythm: Normal rate  Pulses: Normal pulses  Heart sounds: Normal heart sounds  No murmur  Pulmonary:      Effort: Pulmonary effort is normal  No respiratory distress  Breath sounds: Decreased air movement present  Abdominal:      General: There is no distension  Palpations: Abdomen is soft  Tenderness: There is no abdominal tenderness  Comments: PD catheter, dressing CDI   Musculoskeletal:         General: No swelling  Right lower leg: No edema  Left lower leg: No edema  Skin:     General: Skin is warm and dry  Capillary Refill: Capillary refill takes less than 2 seconds  Neurological:      Mental Status: He is alert and oriented to person, place, and time     Psychiatric:         Mood and Affect: Mood normal          Behavior: Behavior normal                       Lab Results and Cultures:   CBC with diff:   Lab Results   Component Value Date    WBC 5 81 02/04/2021    HGB 8 2 (L) 02/04/2021    HCT 26 1 (L) 02/04/2021    MCV 96 02/04/2021    PLT 88 (L) 02/04/2021    MCH 30 3 02/04/2021    MCHC 31 4 02/04/2021    RDW 15 9 (H) 02/04/2021    MPV 11 5 02/04/2021    NRBC 0 02/04/2021      BMP/CMP:  Lab Results   Component Value Date    K 4 3 02/04/2021     02/04/2021    CO2 27 02/04/2021    CO2 24 12/15/2019     (H) 02/04/2021    CREATININE 4 86 (H) 02/04/2021    GLUCOSE 95 12/15/2019    CALCIUM 9 1 02/04/2021    AST 26 01/27/2021    ALT 20 01/27/2021    ALKPHOS 79 01/27/2021    EGFR 11 02/04/2021    EGFR 42 12/29/2017   ,     Coags:   Lab Results   Component Value Date    PTT 66 (H) 10/04/2020    INR 1 86 (H) 02/04/2021   ,   Results from last 7 days   Lab Units 02/04/21  0517 02/03/21  0545 02/02/21  0659 01/30/21  0527 01/29/21  0521   INR  1 86* 1 78* 1 99* 2 33* 2 63*        Lipid Panel: No results found for: CHOL  Lab Results   Component Value Date    HDL 42 01/26/2021     Lab Results   Component Value Date    HDL 42 01/26/2021     Lab Results   Component Value Date    LDLCALC 32 01/26/2021     Lab Results   Component Value Date    TRIG 69 01/26/2021       HgbA1c:   Lab Results   Component Value Date    HGBA1C 5 3 11/17/2017    HGBA1C 5 4 04/25/2017       Blood Culture:   Lab Results   Component Value Date    BLOODCX No Growth After 5 Days   12/26/2019   ,   Urinalysis:   Lab Results   Component Value Date    COLORU Yellow 10/04/2020    COLORU Yellow 09/15/2016    CLARITYU Clear 10/04/2020    CLARITYU Transparent 09/15/2016    SPECGRAV 1 015 10/04/2020    SPECGRAV 1 020 09/15/2016    PHUR 5 5 10/04/2020    PHUR 6 0 09/15/2016    LEUKOCYTESUR Negative 10/04/2020    LEUKOCYTESUR negative 09/15/2016    NITRITE Negative 10/04/2020    NITRITE negative 09/15/2016    PROTEINUA 3+ 09/15/2016    GLUCOSEU Negative 10/04/2020    Rosy Maldonado Negative 10/04/2020    KETONESU negative 09/15/2016    BILIRUBINUR Negative 10/04/2020    BILIRUBINUR negative 09/15/2016    BLOODU Negative 10/04/2020    BLOODU negative 09/15/2016   ,   Urine Culture:   Lab Results   Component Value Date    URINECX 10,000-19,000 cfu/ml  03/08/2018   ,   Wound Culure:  No results found for: WOUNDCULT    VTE Pharmacologic Prophylaxis: Sequential compression device (Venodyne)  and Warfarin (Coumadin)      Thank you for allowing me to participate in the care of [address]  Please don't hesitate to call, text, email, or TigerText with any questions  This text is generated with voice recognition software  There may be translation, syntax,  or grammatical errors  If you have any questions, please contact the dictating provider

## 2021-02-04 NOTE — ASSESSMENT & PLAN NOTE
Wt Readings from Last 3 Encounters:   02/04/21 77 5 kg (170 lb 13 7 oz)   01/19/21 80 3 kg (177 lb)   01/11/21 78 9 kg (174 lb)     Presented with 15 lb weight gain in one month, GONZALES, worsening lower extremity edema  Outpatient cardiology team ordered extra dose of diuretic however no improvement  Patient was noted to have AFSHIN on outpatient labs and referred to ED  · Outpatient diuretic regimen bumex 4 mg PO BID with k 20 BID and metolazone 5 mg  2 x weekly  · Heart failure following, was on Bumex drip for several days  · Discontinued 2/1 and placed on PO diuretics, however overnight after PD catheter placement developed worsening SOB, CXR appears to have volume overload  · Back on IV diuretics per cards/renal  · Starting PD today given volume overload  · Last echo 10/2020 showed preserved EF  Mild MR   Severe TR    · I/O, daily weights, low NA diet with FR  · Monitor volume status--improving

## 2021-02-04 NOTE — ASSESSMENT & PLAN NOTE
· Rate controlled on Lopressor  · Anticoagulated on Coumadin  Initially held secondary to supratherapeutic INR      · Resumed 1/28 but now on hold again for PD catheter placement which was canceled for 2/2 and rescheduled for today  · Will resume warfarin today

## 2021-02-04 NOTE — PROGRESS NOTES
Heart Failure/ Pulmonary Hypertension Progress Note - Oliver Sharpe 68 y o  male MRN: [id number]    Unit/Bed#: CW2 213-01 Encounter: [id number]      Assessment:    Principal Problem:    Acute on chronic diastolic (congestive) heart failure (HCC)  Active Problems:    Benign essential hypertension    Hypertension    Bradycardia    Dissection of thoracic aorta (HCC)    Hyperlipidemia    Anemia    Stage 4 chronic kidney disease (HCC)    Atrial fibrillation (HCC)    Insomnia    MI (myocardial infarction) ([facility] [address] )      Subjective:   Jake Mcgill is a 71-year-old man with PMH as below who presented to [facility] 01/26/2021 after being contacted by cardiology office to review abnormal renal function  Patient had increased frequency of metolazone dosing and had minimal improvement with no reported weight loss or symptomatic improvement  Case was discussed with Dr Fe Dacosta and decision was made to have patient proceed to ED for further evaluation  Patient seen and examined  Bradycardia improved with holding BB  S/P PD catheter placement yesterday  Per RN report, patient became SOB/hypoxic overnight with O2 sats in the low 90s  Objective: Intake/ Output: not recorded  Weight: bed scale ? ?    Tele: AF 50-60s  Acute on chronic HFpEF - Volume management: S/P PD catheter placement  Patient congested this AM with elevated JVP  Possibly iatrogenic volume overload  Will restart Bumex 4 mg IV BID  Reassess volume status in AM  BP controlled at this point off antihypertensives  Continue to monitor  May need to restart Amlodipine as outpatient    Currently enrolled in [facility] program                      TTE 04/24/2019: LVEF 60%  LVIDd 4 31 cm  IVSd 1 4 cm  Grade 1 DD  Normal RV size and RVSF  Mild TR  PASP 35 mmHg                TTE 10/06/2020: LVEF 60%  LVIDd 4 69 cm  IVSd 1 41 cm  Grade 2 DD  Normal RV size and RVSF  KUNAL  Mild MR  Severe TR                   Paroxysmal atrial [id number] = 4 (age, CHF, HTN) Anticoagulation on Coumadin  Persistently bradycardic this admit into the mid 30's  Trial off Metop for now and monitor  Bradycardia  With rates in the 30-40s seen on telemetry this admit  Improved  Consider further monitoring as outpatient, ie Zio patch or HM to further evaluate   BB d/c for now  Continue to monitor response as outpatient        Hypertension  Well controlled  Amlodipine 5 mg daily currently on hold for soft BP        Hyperlipidemia  FLP 1/26/21: TC 88, TD 69, HDL 42, LDL 32  Continue on atorvastatin 40 mg daily       Chronic kidney disease, stage IV Baseline creatinine was 2 0-2 3, new baseline likely 3 5-4  S/P PD cath placement on 2/3/21     Follows with Dr Petra Gee as outpatient  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 with hemiarch reconstruction and aortic valve resuspension with a 26 mm Dacron graft on 12/15/2019 by Dr Daniella Kaur               Benign prostatic hyperplasia  History of COVID-19 infection: diagnosed in 07/2020  Review of Systems   All other systems reviewed and are negative  [employer] (day, reason): [place] catheter (day, reason):    Vitals: Blood pressure 110/59, pulse 59, temperature 98 2 °F (36 8 °C), temperature source Oral, resp  rate 17, height 5' 7" (1 702 m), weight 77 5 kg (170 lb 13 7 oz), SpO2 93 %  , Body mass index is 26 76 kg/m² , I/O last 3 completed shifts: In: 350 [I V :350]  Out: 420 [Urine:420]  No intake/output data recorded  Wt Readings from Last 3 Encounters:   02/04/21 77 5 kg (170 lb 13 7 oz)   01/19/21 80 3 kg (177 lb)   01/11/21 78 9 kg (174 lb)       Intake/Output Summary (Last 24 hours) at 2/4/2021 0820  Last data filed at 2/3/2021 1524  Gross per 24 hour   Intake 350 ml   Output --   Net 350 ml     I/O last 3 completed shifts:   In: 350 [I V :350]  Out: 420 [Urine:420]    Atrial fib, 50-60 bpm        Physical Exam:  Vitals:    02/04/21 0330 02/04/21 0439 02/04/21 0541 02/04/21 0704   BP: 113/58 110/58  110/59   BP Location: Left arm   Left arm   Pulse: 74 59     Resp: 18   17   Temp: (!) 96 2 °F (35 7 °C) (!) 92 °F (33 3 °C)  98 2 °F (36 8 °C)   TempSrc: Oral   Oral   SpO2: 95% 93%     Weight:   77 5 kg (170 lb 13 7 oz)    Height:           GEN: Oliver SEBASTIAN Core appears well, alert and oriented x 3, pleasant and cooperative   HEENT: pupils equal, round, and reactive to light; extraocular muscles intact  NECK: supple, no carotid bruits   HEART: regular rhythm, normal S1 and S2, no murmurs, clicks, gallops or rubs, JVP is elevated     LUNGS: coarse to auscultation bilaterally; +expiratory wheezes, few scattered rales, no rhonchi   ABDOMEN: normal bowel sounds, soft, no tenderness, no distention  EXTREMITIES: peripheral pulses normal; no clubbing, cyanosis, or edema  NEURO: no focal findings   SKIN: normal without suspicious lesions on exposed skin      Current Facility-Administered Medications:     acetaminophen (TYLENOL) tablet 650 mg, 650 mg, Oral, Q6H PRN, EL Roth    atorvastatin (LIPITOR) tablet 40 mg, 40 mg, Oral, Daily, EL Roth, 40 mg at 02/03/21 0849    bumetanide (BUMEX) tablet 4 mg, 4 mg, Oral, BID, EL Sanabria    guaiFENesin (MUCINEX) 12 hr tablet 600 mg, 600 mg, Oral, Q12H [facility] CHIDI Santos PA-C, 600 mg at 02/04/21 0505    HYDROmorphone (DILAUDID) injection 0 5 mg, 0 5 mg, Intravenous, Q2H PRN, Sage Garcia MD    melatonin tablet 6 mg, 6 mg, Oral, HS, EL Roth, 6 mg at 02/03/21 2129    ondansetron (ZOFRAN) injection 4 mg, 4 mg, Intravenous, Q6H PRN, EL Roth    oxyCODONE (ROXICODONE) immediate release tablet 10 mg, 10 mg, Oral, Q4H PRN, Sage Garcia MD    oxyCODONE (ROXICODONE) IR tablet 5 mg, 5 mg, Oral, Q4H PRN, Sage Garcia MD    sevelamer (RENAGEL) tablet 800 mg, 800 mg, Oral, TID With Meals, Amadou Torres DO, 800 mg at 02/03/21 1729    Insert peripheral IV, , , Once **AND** sodium chloride (PF) 0 9 % injection 3 mL, 3 mL, Intravenous, Q1H PRN, EL Roth    traZODone (DESYREL) tablet 25 mg, 25 mg, Oral, HS, EL Roth, 25 mg at 02/03/21 2130      Labs & Results:        Results from last 7 days   Lab Units 02/04/21  0516 01/29/21  0521   WBC Thousand/uL 5 81 5 03   HEMOGLOBIN g/dL 8 2* 8 2*   HEMATOCRIT % 26 1* 26 1*   PLATELETS Thousands/uL 88* 99*         Results from last 7 days   Lab Units 02/04/21  0516 02/03/21  0545 02/02/21  0704   POTASSIUM mmol/L 4 3 4 6 4 2   CHLORIDE mmol/L 103 103 104   CO2 mmol/L 27 28 28   BUN mg/dL 129* 129* 121*   CREATININE mg/dL 4 86* 4 53* 4 31*   CALCIUM mg/dL 9 1 9 7 9 5     Results from last 7 days   Lab Units 02/04/21  0517 02/03/21  0545 02/02/21  0659   INR  1 86* 1 78* 1 99*         Counseling / Coordination of Care  Total floor / unit time spent today 20 minutes  Greater than 50% of total time was spent with the patient and / or family counseling and / or coordination of care  A description of the counseling / coordination of care: 20  Jenelle Cortez

## 2021-02-04 NOTE — ASSESSMENT & PLAN NOTE
Also with 2 second pause noted  · Lopressor dose decreased and then now held  · Asymptomatic  · Appreciate cardiology recommendations, possible Zio patch?   · Continue telemetry

## 2021-02-04 NOTE — CASE MANAGEMENT
CM faxed clinical documentation to [facility] in [place] (F: [phone])  ANTONIO received call from [facility] with [facility] in [place] requesting dialysis order be sent to the same fax number  Pt has not yet had dialysis treatment, pt to be transferred to the 8th floor to begin treatment  CM provided handoff to floor CM

## 2021-02-04 NOTE — PROGRESS NOTES
Progress Note - Oliver Sharpe 1940, 68 y o  male MRN: [id number]    Unit/Bed#: CW2 213-01 Encounter: [id number]    Primary Care Provider: EL Logan   Date and time admitted to hospital: 1/26/2021  1:24 PM    * Acute on chronic diastolic (congestive) heart failure (HCC)  Assessment & Plan  Wt Readings from Last 3 Encounters:   02/04/21 77 5 kg (170 lb 13 7 oz)   01/19/21 80 3 kg (177 lb)   01/11/21 78 9 kg (174 lb)     Presented with 15 lb weight gain in one month, GONZALES, worsening lower extremity edema  Outpatient cardiology team ordered extra dose of diuretic however no improvement  Patient was noted to have AFSHIN on outpatient labs and referred to ED  · Outpatient diuretic regimen bumex 4 mg PO BID with k 20 BID and metolazone 5 mg  2 x weekly  · Heart failure following, was on Bumex drip for several days  · Discontinued 2/1 and placed on PO diuretics, however overnight after PD catheter placement developed worsening SOB, CXR appears to have volume overload  · Back on IV diuretics per cards/renal  · Starting PD today given volume overload  · Last echo 10/2020 showed preserved EF  Mild MR  Severe TR    · I/O, daily weights, low NA diet with FR  · Monitor volume status--improving        Stage 4 chronic kidney disease [facility])  Assessment & Plan  Lab Results   Component Value Date    EGFR 11 02/04/2021    EGFR 12 02/03/2021    EGFR 12 02/02/2021    CREATININE 4 86 (H) 02/04/2021    CREATININE 4 53 (H) 02/03/2021    CREATININE 4 31 (H) 02/02/2021   · Baseline creatinine 2-2 9 approximately    4 19 on presentation, continues to worsen   · Holding norvasc 2/2 soft BP  · Nephrology following, plan for PD  · Catheter placed 2/3 with IR  · Plan was originally outpatient f/u however given volume overload and worsening creatinine, starting PD today   · Avoid nephrotoxins and hypotension  · Known to Dr Su Blanco as outpatient    Insomnia  Assessment & Plan  · Continue melatonin  · Continue Trazodone HS    Atrial fibrillation ([address] )  Assessment & Plan  · Rate controlled on Lopressor  · Anticoagulated on Coumadin  Initially held secondary to supratherapeutic INR  · Resumed 1/28 but now on hold again for PD catheter placement which was canceled for 2/2 and rescheduled for today  · Will resume warfarin today    Anemia  Assessment & Plan  · Chronic, stable at baseline  · Received IV iron per nephro    Hyperlipidemia  Assessment & Plan  · Continue statin    Dissection of thoracic aorta [facility])  Assessment & Plan  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  · 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 · Continue telemetry    Benign essential hypertension  Assessment & Plan  Blood pressure on soft side  · Holding norvasc  · Lopressor on hold  · Monitor        VTE Pharmacologic Prophylaxis:   Pharmacologic: Warfarin (Coumadin)  Mechanical VTE Prophylaxis in Place: Yes    Patient Centered Rounds: I have performed bedside rounds with nursing staff today  Discussions with Specialists or Other Care Team Provider: renal, cards    Education and Discussions with Family / Patient: patient  Called daughter Claudia Rincon with update  Time Spent for Care: 30 minutes  More than 50% of total time spent on counseling and coordination of care as described above      Current Length of Stay: 9 day(s)    Current Patient Status: Inpatient   Certification Statement: The patient will continue to require additional inpatient hospital stay due to starting PD     Discharge Plan: no plans now    Code Status: Level 1 - Full Code      Subjective:   Pt overnight developed increased SOB and cough  CXR shows some increased volume  Renal starting PD  Objective:     Vitals:   Temp (24hrs), Av 8 °F (35 4 °C), Min:92 °F (33 3 °C), Max:98 2 °F (36 8 °C)    Temp:  [92 °F (33 3 °C)-98 2 °F (36 8 °C)] 98 2 °F (36 8 °C)  HR:  [52-93] 59  Resp:  [14-18] 18  BP: ()/(49-70) 94/49  SpO2:  [91 %-98 %] 94 %  Body mass index is 26 76 kg/m²  Input and Output Summary (last 24 hours): Intake/Output Summary (Last 24 hours) at 2021 1119  Last data filed at 2021 0857  Gross per 24 hour   Intake 364 ml   Output 175 ml   Net 189 ml       Physical Exam:     Physical Exam  Vitals signs and nursing note reviewed  Constitutional:       General: He is not in acute distress  Cardiovascular:      Rate and Rhythm: Regular rhythm  Bradycardia present  Pulmonary:      Comments: Coarse   Abdominal:      General: There is no distension  Tenderness: There is no abdominal tenderness  Comments: PD cath appears c/d/i    Musculoskeletal:      Right lower leg: No edema  Left lower leg: No edema  Neurological:      Mental Status: He is oriented to person, place, and time  Psychiatric:         Mood and Affect: Mood normal          Additional Data:     Labs:    Results from last 7 days   Lab Units 21  0516   WBC Thousand/uL 5 81   HEMOGLOBIN g/dL 8 2*   HEMATOCRIT % 26 1*   PLATELETS Thousands/uL 88*   NEUTROS PCT % 69   LYMPHS PCT % 12*   MONOS PCT % 16*   EOS PCT % 2     Results from last 7 days   Lab Units 21  0516   POTASSIUM mmol/L 4 3   CHLORIDE mmol/L 103   CO2 mmol/L 27   BUN mg/dL 129*   CREATININE mg/dL 4 86*   CALCIUM mg/dL 9 1     Results from last 7 days   Lab Units 21  0517   INR  1 86*       * I Have Reviewed All Lab Data Listed Above  * Additional Pertinent Lab Tests Reviewed:  All Labs Within Last 24 Hours Reviewed    Imaging:    Imaging Reports Reviewed Today Include: all  Imaging Personally Reviewed by Myself Includes:  none    Recent Cultures (last 7 days): Last 24 Hours Medication List:   Current Facility-Administered Medications   Medication Dose Route Frequency Provider Last Rate    acetaminophen  650 mg Oral Q6H PRN EL Roth      atorvastatin  40 mg Oral Daily EL Roth      bumetanide  4 mg Intravenous BID [employer]      guaiFENesin  600 mg Oral Q12H [address] Panchito Santos PA-C      HYDROmorphone  0 5 mg Intravenous Q2H PRN Valeria Shoulder, MD      melatonin  6 mg Oral HS EL Roth      ondansetron  4 mg Intravenous Q6H PRN EL Cardenas      oxyCODONE  10 mg Oral Q4H PRN Valeria Shoulder, MD      oxyCODONE  5 mg Oral Q4H PRN Valeria Shoulder, MD      sevelamer  800 mg Oral TID With Meals Rodney Torres,       sodium chloride (PF)  3 mL Intravenous Q1H PRN EL Roth      traZODone  25 mg Oral HS EL Roth      warfarin  2 mg Oral Daily (warfarin) Erin Guerra PA-C          Today, Patient Was Seen By: Erin Guerra PA-C    ** Please Note: Dictation voice to text software may have been used in the creation of this document   **

## 2021-02-04 NOTE — ASSESSMENT & PLAN NOTE
Lab Results   Component Value Date    EGFR 11 02/04/2021    EGFR 12 02/03/2021    EGFR 12 02/02/2021    CREATININE 4 86 (H) 02/04/2021    CREATININE 4 53 (H) 02/03/2021    CREATININE 4 31 (H) 02/02/2021   · Baseline creatinine 2-2 9 approximately    4 19 on presentation, continues to worsen   · Holding norvasc 2/2 soft BP  · Nephrology following, plan for PD  · Catheter placed 2/3 with IR  · Plan was originally outpatient f/u however given volume overload and worsening creatinine, starting PD today   · Avoid nephrotoxins and hypotension  · Known to Dr Brianne Tinajero as outpatient

## 2021-02-04 NOTE — PLAN OF CARE
Problem: Potential for Falls  Goal: Patient will remain free of falls  Description: INTERVENTIONS:  - Assess patient frequently for physical needs  -  Identify cognitive and physical deficits and behaviors that affect risk of falls    -  [place] fall precautions as indicated by assessment   - Educate patient/family on patient safety including physical limitations  - Instruct patient to call for assistance with activity based on assessment  - Modify environment to reduce risk of injury  - Consider OT/PT consult to assist with strengthening/mobility  Outcome: Progressing     Problem: CARDIOVASCULAR - ADULT  Goal: Maintains optimal cardiac output and hemodynamic stability  Description: INTERVENTIONS:  - Monitor I/O, vital signs and rhythm  - Monitor for S/S and trends of decreased cardiac output  - Administer and titrate ordered vasoactive medications to optimize hemodynamic stability  - Assess quality of pulses, skin color and temperature  - Assess for signs of decreased coronary artery perfusion  - Instruct patient to report change in severity of symptoms  Outcome: Progressing     Problem: METABOLIC, FLUID AND ELECTROLYTES - ADULT  Goal: Electrolytes maintained within normal limits  Description: INTERVENTIONS:  - Monitor labs and assess patient for signs and symptoms of electrolyte imbalances  - Administer electrolyte replacement as ordered  - Monitor response to electrolyte replacements, including repeat lab results as appropriate  - Instruct patient on fluid and nutrition as appropriate  Outcome: Progressing  Goal: Fluid balance maintained  Description: INTERVENTIONS:  - Monitor labs   - Monitor I/O and WT  - Instruct patient on fluid and nutrition as appropriate  - Assess for signs & symptoms of volume excess or deficit  Outcome: Progressing     Problem: MUSCULOSKELETAL - ADULT  Goal: Maintain or return mobility to safest level of function  Description: INTERVENTIONS:  - Assess patient's ability to carry out ADLs; assess patient's baseline for ADL function and identify physical deficits which impact ability to perform ADLs (bathing, care of mouth/teeth, toileting, grooming, dressing, etc )  - Assess/evaluate cause of self-care deficits   - Assess range of motion  - Assess patient's mobility  - Assess patient's need for assistive devices and provide as appropriate  - Encourage maximum independence but intervene and supervise when necessary  - Involve family in performance of ADLs  - Assess for home care needs following discharge   - Consider OT consult to assist with ADL evaluation and planning for discharge  - Provide patient education as appropriate  Outcome: Progressing     Problem: Nutrition/Hydration-ADULT  Goal: Nutrient/Hydration intake appropriate for improving, restoring or maintaining nutritional needs  Description: Monitor and assess patient's nutrition/hydration status for malnutrition  Collaborate with interdisciplinary team and initiate plan and interventions as ordered  Monitor patient's weight and dietary intake as ordered or per policy  Utilize nutrition screening tool and intervene as necessary  Determine patient's food preferences and provide high-protein, high-caloric foods as appropriate       INTERVENTIONS:  - Monitor oral intake, urinary output, labs, and treatment plans  - Assess nutrition and hydration status and recommend course of action  - Evaluate amount of meals eaten  - Assist patient with eating if necessary   - Allow adequate time for meals  - Recommend/ encourage appropriate diets, oral nutritional supplements, and vitamin/mineral supplements  - Order, calculate, and assess calorie counts as needed  - Recommend, monitor, and adjust tube feedings and TPN/PPN based on assessed needs  - Assess need for intravenous fluids  - Provide specific nutrition/hydration education as appropriate  - Include patient/family/caregiver in decisions related to nutrition  Outcome: Progressing     Problem: PAIN - ADULT  Goal: Verbalizes/displays adequate comfort level or baseline comfort level  Description: Interventions:  - Encourage patient to monitor pain and request assistance  - Assess pain using appropriate pain scale  - Administer analgesics based on type and severity of pain and evaluate response  - Implement non-pharmacological measures as appropriate and evaluate response  - Consider cultural and social influences on pain and pain management  - Notify physician/advanced practitioner if interventions unsuccessful or patient reports new pain  Outcome: Progressing     Problem: INFECTION - ADULT  Goal: Absence or prevention of progression during hospitalization  Description: INTERVENTIONS:  - Assess and monitor for signs and symptoms of infection  - Monitor lab/diagnostic results  - Monitor all insertion sites, i e  indwelling lines, tubes, and drains  - Monitor endotracheal if appropriate and nasal secretions for changes in amount and color  - [place] appropriate cooling/warming therapies per order  - Administer medications as ordered  - Instruct and encourage patient and family to use good hand hygiene technique  - Identify and instruct in appropriate isolation precautions for identified infection/condition  Outcome: Progressing     Problem: SAFETY ADULT  Goal: Patient will remain free of falls  Description: INTERVENTIONS:  - Assess patient frequently for physical needs  -  Identify cognitive and physical deficits and behaviors that affect risk of falls    -  [place] fall precautions as indicated by assessment   - Educate patient/family on patient safety including physical limitations  - Instruct patient to call for assistance with activity based on assessment  - Modify environment to reduce risk of injury  - Consider OT/PT consult to assist with strengthening/mobility  Outcome: Progressing  Goal: Maintain or return to baseline ADL function  Description: INTERVENTIONS:  -  Assess patient's ability to carry out ADLs; assess patient's baseline for ADL function and identify physical deficits which impact ability to perform ADLs (bathing, care of mouth/teeth, toileting, grooming, dressing, etc )  - Assess/evaluate cause of self-care deficits   - Assess range of motion  - Assess patient's mobility; develop plan if impaired  - Assess patient's need for assistive devices and provide as appropriate  - Encourage maximum independence but intervene and supervise when necessary  - Involve family in performance of ADLs  - Assess for home care needs following discharge   - Consider OT consult to assist with ADL evaluation and planning for discharge  - Provide patient education as appropriate  Outcome: Progressing  Goal: Maintain or return mobility status to optimal level  Description: INTERVENTIONS:  - Assess patient's baseline mobility status (ambulation, transfers, stairs, etc )    - Identify cognitive and physical deficits and behaviors that affect mobility  - Identify mobility aids required to assist with transfers and/or ambulation (gait belt, sit-to-stand, lift, walker, cane, etc )  - [place] fall precautions as indicated by assessment  - Record patient progress and toleration of activity level on Mobility SBAR; progress patient to next Phase/Stage  - Instruct patient to call for assistance with activity based on assessment  - Consider rehabilitation consult to assist with strengthening/weightbearing, etc   Outcome: Progressing     Problem: DISCHARGE PLANNING  Goal: Discharge to home or other facility with appropriate resources  Description: INTERVENTIONS:  - Identify barriers to discharge w/patient and caregiver  - Arrange for needed discharge resources and transportation as appropriate  - Identify discharge learning needs (meds, wound care, etc )  - Arrange for interpretive services to assist at discharge as needed  - Refer to Case Management Department for coordinating discharge planning if the patient needs post-hospital services based on physician/advanced practitioner order or complex needs related to functional status, cognitive ability, or social support system  Outcome: Progressing

## 2021-02-04 NOTE — PROGRESS NOTES
NEPHROLOGY PROGRESS NOTE   Chris Sanabria Core 68 y o  male MRN: [id number]  Unit/Bed#: CW2 213-01 Encounter: [id number]      ASSESSMENT & PLAN    1  AFSHIN (POA) on stage 4 chronic kidney disease with a baseline creatinine in the mid 2s and an estimated GFR in the low 20 now with progressive CKD  ? He has required hemodialysis in the past  ? Symptomatically worse with a creatinine that has increased  ? Order placed for PD catheter placement-platelet count low will require some  ? His creatinine continues to worsen unfortunately  ? He has asterixis  ? With uremic symptoms and volume overload we have indications to start renal replacement therapy  ? IV Bumex initiated by Cardiology will continue this  ? Will move him today 8th floor  ? Appreciate interventional radiology placement of PD catheter  ? CCPD 6 hours 1 5% 1 L exchanges every 2 hours     2  Electrolytes/Acid Base  ? Hypokalemia-improved     3  Blood Pressure-hypotension  ? Maps are acceptable  ? Low-dose metoprolol-on hold   ? Cardiology follow-up  ? Diuresing-Bumex 4 mg IV b i d      4  Anemia of CKD  ? Trend hemoglobin  ? Received course of Venofer  ? Thrombocytopenia-platelets pre PD catheter placement     5  CKD-BMD  ? Hyperphosphatemia-sevelemer 800 mg t i d      6  Clinical Course/CV Risk Reduction/Health maintenance/communication  ? Diastolic heart failure with an ejection fraction of 60%  ?  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    SUBJECTIVE:    Patient was seen today  Able to lay flat but complaining of tremor  No chest pain or shortness of breath    OBJECTIVE:  Current Weight: Weight - Scale: 77 5 kg (170 lb 13 7 oz)  @  Vitals:    02/04/21 0330 02/04/21 0439 02/04/21 0541 02/04/21 0704   BP: 113/58 110/58  110/59   BP Location: Left arm   Left arm   Pulse: 74 59     Resp: 18   17   Temp: (!) 96 2 °F (35 7 °C) (!) 92 °F (33 3 °C)  98 2 °F (36 8 °C)   TempSrc: Oral   Oral   SpO2: 95% 93%     Weight:   77 5 kg (170 lb 13 7 oz)    Height:           Intake/Output Summary (Last 24 hours) at 2/4/2021 1002  Last data filed at 2/4/2021 0857  Gross per 24 hour   Intake 350 ml   Output --   Net 350 ml     Weight (last 2 days)     Date/Time   Weight    02/04/21 0541   77 5 (170 86)    02/02/21 0600   76 7 (169 09)              General: conscious, cooperative, in no acute distress  Eyes: conjunctivae pink, anicteric sclerae  ENT: lips and mucous membranes moist  Neck: supple, positive JVD  Chest: no respiratory distress, no accessory muscle use, normal respiratory effort  CVS: normal heart rate, no friction rub  Abdomen: soft, non-tender, non-distended, normoactive bowel sounds  Extremities:  Trace edema  Skin: no rash  Neuro: awake, alert, oriented      Medications:    Current Facility-Administered Medications:     acetaminophen (TYLENOL) tablet 650 mg, 650 mg, Oral, Q6H PRN, EL Roth    atorvastatin (LIPITOR) tablet 40 mg, 40 mg, Oral, Daily, EL Roth, 40 mg at 02/03/21 0849    bumetanide (BUMEX) injection 4 mg, 4 mg, Intravenous, BID, EL Sanabria    guaiFENesin (MUCINEX) 12 hr tablet 600 mg, 600 mg, Oral, Q12H [address] [place] D TRINY Santos, 600 mg at 02/04/21 0505    HYDROmorphone (DILAUDID) injection 0 5 mg, 0 5 mg, Intravenous, Q2H PRN, Mike Leyva MD    melatonin tablet 6 mg, 6 mg, Oral, HS, EL Roth, 6 mg at 02/03/21 2129    ondansetron (ZOFRAN) injection 4 mg, 4 mg, Intravenous, Q6H PRN, EL Roth    oxyCODONE (ROXICODONE) immediate release tablet 10 mg, 10 mg, Oral, Q4H PRN, Mike Leyva MD    oxyCODONE (ROXICODONE) IR tablet 5 mg, 5 mg, Oral, Q4H PRN, Mike Leyva MD    sevelamer (RENAGEL) tablet 800 mg, 800 mg, Oral, TID With Meals, Ernie Torres DO, 800 mg at 02/03/21 1721    Insert peripheral IV, , , Once **AND** sodium chloride (PF) 0 9 % injection 3 mL, 3 mL, Intravenous, Q1H PRN, Edwina Brown EL Ryder    traZODone (DESYREL) tablet 25 mg, 25 mg, Oral, HS, Char DE ANDA EL Ryder, 25 mg at 02/03/21 2130    Invasive Devices:      Lab Results:   Results from last 7 days   Lab Units 02/04/21  0516 02/03/21  0545 02/02/21  0704 02/01/21  0512 01/31/21  0444 01/30/21  0527 01/29/21  0521   WBC Thousand/uL 5 81  --   --   --   --   --  5 03   HEMOGLOBIN g/dL 8 2*  --   --   --   --   --  8 2*   HEMATOCRIT % 26 1*  --   --   --   --   --  26 1*   PLATELETS Thousands/uL 88*  --   --   --   --   --  99*   POTASSIUM mmol/L 4 3 4 6 4 2 4 0 3 5 3 3* 3 3*   CHLORIDE mmol/L 103 103 104 106 104 102 104   CO2 mmol/L 27 28 28 31 31 31 30   BUN mg/dL 129* 129* 121* 119* 122* 112* 118*   CREATININE mg/dL 4 86* 4 53* 4 31* 3 91* 3 79* 3 73* 3 97*   CALCIUM mg/dL 9 1 9 7 9 5 9 3 9 5 9 1 8 9   MAGNESIUM mg/dL  --   --   --   --   --   --  2 3   PHOSPHORUS mg/dL  --   --   --   --   --  5 4*  --          Portions of the record may have been created with voice recognition software  Occasional wrong word or "sound a like" substitutions may have occurred due to the inherent limitations of voice recognition software  Read the chart carefully and recognize, using context, where substitutions have occurred  If you have any questions, please contact the dictating provider

## 2021-02-05 LAB
ALBUMIN SERPL BCP-MCNC: 2.7 G/DL (ref 3.5–5)
ALP SERPL-CCNC: 81 U/L (ref 46–116)
ALT SERPL W P-5'-P-CCNC: 15 U/L (ref 12–78)
ANION GAP SERPL CALCULATED.3IONS-SCNC: 6 MMOL/L (ref 4–13)
AST SERPL W P-5'-P-CCNC: 26 U/L (ref 5–45)
BASOPHILS # BLD AUTO: 0.02 THOUSANDS/ΜL (ref 0–0.1)
BASOPHILS NFR BLD AUTO: 0 % (ref 0–1)
BILIRUB SERPL-MCNC: 0.79 MG/DL (ref 0.2–1)
BUN SERPL-MCNC: 122 MG/DL (ref 5–25)
CALCIUM ALBUM COR SERPL-MCNC: 9.7 MG/DL (ref 8.3–10.1)
CALCIUM SERPL-MCNC: 8.7 MG/DL (ref 8.3–10.1)
CHLORIDE SERPL-SCNC: 103 MMOL/L (ref 100–108)
CO2 SERPL-SCNC: 30 MMOL/L (ref 21–32)
CREAT SERPL-MCNC: 4.77 MG/DL (ref 0.6–1.3)
EOSINOPHIL # BLD AUTO: 0.21 THOUSAND/ΜL (ref 0–0.61)
EOSINOPHIL NFR BLD AUTO: 4 % (ref 0–6)
ERYTHROCYTE [DISTWIDTH] IN BLOOD BY AUTOMATED COUNT: 16 % (ref 11.6–15.1)
GFR SERPL CREATININE-BSD FRML MDRD: 11 ML/MIN/1.73SQ M
GLUCOSE SERPL-MCNC: 108 MG/DL (ref 65–140)
HCT VFR BLD AUTO: 25.9 % (ref 36.5–49.3)
HGB BLD-MCNC: 8.2 G/DL (ref 12–17)
IMM GRANULOCYTES # BLD AUTO: 0.02 THOUSAND/UL (ref 0–0.2)
IMM GRANULOCYTES NFR BLD AUTO: 0 % (ref 0–2)
INR PPP: 2.03 (ref 0.84–1.19)
LYMPHOCYTES # BLD AUTO: 0.76 THOUSANDS/ΜL (ref 0.6–4.47)
LYMPHOCYTES NFR BLD AUTO: 13 % (ref 14–44)
MCH RBC QN AUTO: 30.8 PG (ref 26.8–34.3)
MCHC RBC AUTO-ENTMCNC: 31.7 G/DL (ref 31.4–37.4)
MCV RBC AUTO: 97 FL (ref 82–98)
MONOCYTES # BLD AUTO: 1.07 THOUSAND/ΜL (ref 0.17–1.22)
MONOCYTES NFR BLD AUTO: 18 % (ref 4–12)
NEUTROPHILS # BLD AUTO: 3.78 THOUSANDS/ΜL (ref 1.85–7.62)
NEUTS SEG NFR BLD AUTO: 65 % (ref 43–75)
NRBC BLD AUTO-RTO: 0 /100 WBCS
PLATELET # BLD AUTO: 81 THOUSANDS/UL (ref 149–390)
PMV BLD AUTO: 11.2 FL (ref 8.9–12.7)
POTASSIUM SERPL-SCNC: 3.7 MMOL/L (ref 3.5–5.3)
PROT SERPL-MCNC: 7.4 G/DL (ref 6.4–8.2)
PROTHROMBIN TIME: 22.8 SECONDS (ref 11.6–14.5)
RBC # BLD AUTO: 2.66 MILLION/UL (ref 3.88–5.62)
SODIUM SERPL-SCNC: 139 MMOL/L (ref 136–145)
WBC # BLD AUTO: 5.86 THOUSAND/UL (ref 4.31–10.16)

## 2021-02-05 PROCEDURE — 85025 COMPLETE CBC W/AUTO DIFF WBC: CPT | Performed by: INTERNAL MEDICINE

## 2021-02-05 PROCEDURE — 99232 SBSQ HOSP IP/OBS MODERATE 35: CPT | Performed by: INTERNAL MEDICINE

## 2021-02-05 PROCEDURE — 99233 SBSQ HOSP IP/OBS HIGH 50: CPT | Performed by: INTERNAL MEDICINE

## 2021-02-05 PROCEDURE — 85610 PROTHROMBIN TIME: CPT | Performed by: INTERNAL MEDICINE

## 2021-02-05 PROCEDURE — 80053 COMPREHEN METABOLIC PANEL: CPT | Performed by: INTERNAL MEDICINE

## 2021-02-05 RX ADMIN — SEVELAMER HYDROCHLORIDE 800 MG: 800 TABLET, FILM COATED PARENTERAL at 08:26

## 2021-02-05 RX ADMIN — WARFARIN SODIUM 2 MG: 2 TABLET ORAL at 17:26

## 2021-02-05 RX ADMIN — HEPARIN SODIUM: 1000 INJECTION INTRAVENOUS; SUBCUTANEOUS at 22:00

## 2021-02-05 RX ADMIN — GUAIFENESIN 600 MG: 600 TABLET, EXTENDED RELEASE ORAL at 08:26

## 2021-02-05 RX ADMIN — BUMETANIDE 4 MG: 0.25 INJECTION INTRAMUSCULAR; INTRAVENOUS at 09:14

## 2021-02-05 RX ADMIN — HEPARIN SODIUM: 1000 INJECTION INTRAVENOUS; SUBCUTANEOUS at 02:33

## 2021-02-05 RX ADMIN — BUMETANIDE 4 MG: 0.25 INJECTION INTRAMUSCULAR; INTRAVENOUS at 17:27

## 2021-02-05 RX ADMIN — BENZONATATE 100 MG: 100 CAPSULE ORAL at 08:35

## 2021-02-05 RX ADMIN — SEVELAMER HYDROCHLORIDE 800 MG: 800 TABLET, FILM COATED PARENTERAL at 17:26

## 2021-02-05 RX ADMIN — BENZONATATE 100 MG: 100 CAPSULE ORAL at 00:23

## 2021-02-05 RX ADMIN — SEVELAMER HYDROCHLORIDE 800 MG: 800 TABLET, FILM COATED PARENTERAL at 12:06

## 2021-02-05 RX ADMIN — ATORVASTATIN CALCIUM 40 MG: 40 TABLET, FILM COATED ORAL at 08:26

## 2021-02-05 NOTE — ASSESSMENT & PLAN NOTE
Lab Results   Component Value Date    EGFR 11 02/05/2021    EGFR 11 02/04/2021    EGFR 12 02/03/2021    CREATININE 4 77 (H) 02/05/2021    CREATININE 4 86 (H) 02/04/2021    CREATININE 4 53 (H) 02/03/2021   ·   · Baseline creatinine 2-2 9 approximately    4 19 on presentation  · Holding norvasc 2/2 soft BP  · Known to Dr Gabriele Esquivel as outpatient  · Peritoneal dialysis per nephrology  · Nephrology following

## 2021-02-05 NOTE — NURSING NOTE
Upon previously assessing PD catheter, noted that Storm extension had not been applied in the OR and the tubing was taped under the fresh surgical dressing  Gently removed edges of dressing to obtain tubing needed to apply Storm extension piece so that PD could be performed as ordered  Surgical dressing then reinforced  Soaked end of catheter in betadine for five full minutes then attached Storm extension piece using sterile technique

## 2021-02-05 NOTE — NURSING NOTE
Cycler alarmed six times for low drain volume with cycler only having drained a total of 379ml out of a 1000ml fill  Notified Dr Charis Pallas with nephrology  Per Dr Charis Pallas will continue by doing manual drain on PD cycler  Increased HOB 20-30 degrees per physician to attempt to help with drain  Pt  Remains in supine position as he has new PD cath

## 2021-02-05 NOTE — PROGRESS NOTES
Advanced Heart Failure / Pulmonary Hypertension Service Progress Note    Oliver Sharpe 68 y o  male   MRN: [id number]  Unit/Bed#: [facility] 806-01; Encounter: [id number]    Assessment:  Principal Problem:    Acute on chronic diastolic (congestive) heart failure (HCC)  Active Problems:    Benign essential hypertension    Hypertension    Bradycardia    Dissection of thoracic aorta (HCC)    Hyperlipidemia    Anemia    Stage 4 chronic kidney disease (HCC)    Atrial fibrillation (HCC)    Insomnia    MI (myocardial infarction) ([facility] [address] )      Subjective:   Jake Mcgill is a 61-year-old man with PMH as below who presented to [facility] on 01/26/2021 after being contacted by cardiology office to review abnormal renal function  Patient had increased frequency of metolazone dosing and had minimal improvement with no reported weight loss or symptomatic improvement  Case was discussed with Dr Fe Dacosta (per documentation) and decision was made to have patient proceed to ED for further evaluation  Patient seen and examined  No significant events overnight  Patient with no current complaints  Denies PND and orthopnea  Eating well  Still having some difficulty with sleep during hospitalization  Objective: Intake/ Output: 934 mL / 1325 mL (net negative 391 mL)  Weight: 170 lbs (same as on 02/04)  Telemetry: normal sinus rhythm, rates in 60-70s  Today's Plan:   Continue on IV Bumex 4 BID   Dry weight in 165-170 lbs range per chart review   Continue off beta blockade   PD catheter management per nephrology  Plan:  Acute on chronic HFpEF; LVEF 60%; LVIDd 4 69 cm; NYHA III; ACC/AHA Stage C              Etiology: Afib, HTN phenotype                TTE 04/24/2019: LVEF 60%  LVIDd 4 31 cm  IVSd 1 4 cm  Grade 1 DD  Normal RV size and RVSF  Mild TR  PASP 35 mmHg                TTE 10/06/2020: LVEF 60%  LVIDd 4 69 cm  IVSd 1 41 cm  Grade 2 DD  Normal RV size and RVSF  KUNAL  Mild MR  Severe TR              Reviewed importance of low sodium diet and fluid restriction               Strict I&Os with daily weights  CV diet with 2L fluid restriction       Neurohormonal Blockade:  --Beta Blocker: metoprolol tartrate 25 mg q12 hours  --ARNi / ACEi / ARB: No   --Aldosterone Antagonist: No   --SGLT2 Inhibitor: No   --Home Diuretic: PO Bumex 4 mg BID with PRN metolazone 5 mg and potassium 20 mEq BID  --Inpatient Diuretic: IV Bumex 4 mg BID       Sudden Cardiac Death Risk Reduction:  --LVEF >35%     Electrical Resynchronization:  --Candidacy for BiV device: narrow QRS     Advanced Therapies: Will continue to monitor      Atrial fibrillation, paroxysmal              [id number] = 4 (age, HF, HTN)              Anticoagulation on Coumadin  Continue on BB as above       Chronic kidney disease, stage IV              Baseline creatinine of 2 3-2 5                Briefly on dialysis in February 2020                Today, creatinine of 4 77 (down from 4 86 on 02/04)              YOWOWWO with Dr Philly Armas as outpatient  Now s/p PD catheter placement; started on PD on 02/04      History of Type A aortic dissection              S/p emergent replacement of ascending aorta with hemiarch reconstruction and aortic valve resuspension with a 26 mm Dacron graft on 12/15/2019 by Dr Leny Weir      Hypertension  Hyperlipidemia  Benign prostatic hyperplasia  History of COVID-19 infection: diagnosed in 07/2020  Vitals:   Blood pressure 109/57, pulse 69, temperature 97 5 °F (36 4 °C), resp  rate 20, height 5' 7" (1 702 m), weight 77 2 kg (170 lb 3 1 oz), SpO2 100 %  Body mass index is 26 66 kg/m²  I/O last 3 completed shifts: In: 693 [P O :920;  I V :14]  Out: 1325 [Urine:1325]    Wt Readings from Last 3 Encounters:   02/05/21 77 2 kg (170 lb 3 1 oz)   01/19/21 80 3 kg (177 lb)   01/11/21 78 9 kg (174 lb)     Vitals:    02/04/21 2321 02/04/21 2322 02/05/21 0538 02/05/21 0755   BP:  105/54 109/57   BP Location:       Pulse:  68  69   Resp:    20   Temp: 97 8 °F (36 6 °C)   97 5 °F (36 4 °C)   TempSrc:       SpO2:  95%  100%   Weight:   77 2 kg (170 lb 3 1 oz)    Height:           Physical Exam  Vitals signs reviewed  Constitutional:       General: He is awake  He is not in acute distress  Appearance: Normal appearance  He is well-developed and overweight  Comments: Face mask present   HENT:      Head: Normocephalic  Nose: Nose normal       Mouth/Throat:      Mouth: Mucous membranes are moist    Eyes:      General: No scleral icterus  Conjunctiva/sclera: Conjunctivae normal    Neck:      Musculoskeletal: Normal range of motion and neck supple  Vascular: JVD present  Trachea: No tracheal deviation  Cardiovascular:      Rate and Rhythm: Normal rate and regular rhythm  No extrasystoles are present  Pulses: Normal pulses  Heart sounds: Murmur present  Pulmonary:      Effort: Pulmonary effort is normal  No tachypnea, bradypnea or respiratory distress  Breath sounds: Normal air entry  No decreased breath sounds, wheezing or rales  Abdominal:      General: Bowel sounds are normal  There is distension  Palpations: Abdomen is soft  Tenderness: There is no abdominal tenderness  Musculoskeletal:      Right lower le+ Pitting Edema present  Left lower le+ Pitting Edema present  Skin:     General: Skin is warm and dry  Coloration: Skin is pale  Neurological:      General: No focal deficit present  Mental Status: He is alert and oriented to person, place, and time  Psychiatric:         Attention and Perception: Attention normal          Mood and Affect: Mood and affect normal          Speech: Speech normal          Behavior: Behavior normal  Behavior is cooperative  Thought Content:  Thought content normal      Central Line (day, reason): No    [place] Catheter (day, reason): No   Peritoneal dialysis catheter: right lower abdomen, placed 02/03/2021      Current Facility-Administered Medications:     acetaminophen (TYLENOL) tablet 650 mg, 650 mg, Oral, Q6H PRN, EL Roth    atorvastatin (LIPITOR) tablet 40 mg, 40 mg, Oral, Daily, EL Roth, 40 mg at 02/05/21 9756    benzonatate (TESSALON PERLES) capsule 100 mg, 100 mg, Oral, TID PRN, Brittany Sears PA-C, 100 mg at 02/05/21 0835    bumetanide (BUMEX) injection 4 mg, 4 mg, Intravenous, BID, EL Sanabria, 4 mg at 02/05/21 0914    dianeal PD-1 5% 1,000 mL with heparin (porcine) 500 Units dialysis solution for CAPD, , Dialysis, Once, Ricki New DO    guaiFENesin (MUCINEX) 12 hr tablet 600 mg, 600 mg, Oral, Q12H [facility] TRINY Santos, 600 mg at 02/05/21 3290    HYDROmorphone (DILAUDID) injection 0 5 mg, 0 5 mg, Intravenous, Q2H PRN, Shahriar Baldwin MD    melatonin tablet 6 mg, 6 mg, Oral, HS, EL Roth, 6 mg at 02/04/21 2114    ondansetron (ZOFRAN) injection 4 mg, 4 mg, Intravenous, Q6H PRN, EL Roth    oxyCODONE (ROXICODONE) immediate release tablet 10 mg, 10 mg, Oral, Q4H PRN, Shahriar Baldwin MD, 10 mg at 02/04/21 2325    oxyCODONE (ROXICODONE) IR tablet 5 mg, 5 mg, Oral, Q4H PRN, Shahriar Baldwin MD    sevelamer (RENAGEL) tablet 800 mg, 800 mg, Oral, TID With Meals, Melinda Torres DO, 800 mg at 02/05/21 1206    Insert peripheral IV, , , Once **AND** sodium chloride (PF) 0 9 % injection 3 mL, 3 mL, Intravenous, Q1H PRN, EL Roth    traZODone (DESYREL) tablet 25 mg, 25 mg, Oral, HS, Char EL Lopez, 25 mg at 02/04/21 2114    warfarin (COUMADIN) tablet 2 mg, 2 mg, Oral, Daily (warfarin), Ashley Rodgers PA-C, 2 mg at 02/04/21 1721    Labs & Results:      Results from last 7 days   Lab Units 02/05/21  0453 02/04/21  0516   WBC Thousand/uL 5 86 5 81   HEMOGLOBIN g/dL 8 2* 8 2*   HEMATOCRIT % 25 9* 26 1*   PLATELETS Thousands/uL 81* 88*         Results from last 7 days   Lab Units 02/05/21 0453 02/04/21  0516 02/03/21  0545   POTASSIUM mmol/L 3 7 4 3 4 6   CHLORIDE mmol/L 103 103 103   CO2 mmol/L 30 27 28   BUN mg/dL 122* 129* 129*   CREATININE mg/dL 4 77* 4 86* 4 53*   CALCIUM mg/dL 8 7 9 1 9 7   ALK PHOS U/L 81  --   --    ALT U/L 15  --   --    AST U/L 26  --   --      Results from last 7 days   Lab Units 02/05/21 0453 02/04/21  0517 02/03/21  0545   INR  2 03* 1 86* 1 78*     Neha Sandy PA-C

## 2021-02-05 NOTE — CASE MANAGEMENT
Faxing PD flow sheets to [place] [id number]       Pt will need a Covid test prior to starting at [place] [id number]

## 2021-02-05 NOTE — PROGRESS NOTES
NEPHROLOGY PROGRESS NOTE   Oliver Sharpe 68 y o  male MRN: [id number]  Unit/Bed#: [facility] 806-01 Encounter: [id number]      ASSESSMENT & PLAN    1  AFSHIN (POA) on stage 4 chronic kidney disease with a baseline creatinine in the mid 2s and an estimated GFR in the low 20 now with progressive CKD  ? He has required hemodialysis in the past  ? Symptomatically worse with a creatinine that has increased  ? Started PD last night with fibrin, heparin added  ? With uremic symptoms and volume overload: indication to start PD  ? IV Bumex initiated by Cardiology will continue this  ? Appreciate interventional radiology placement of PD catheter  ? CCPD 6 hours 1 5% 1 L exchanges every 2 hours with with heparin     2  Electrolytes/Acid Base  ? Hypokalemia-monitor now with PD stable     3  Blood Pressure-hypotension  ? Maps are acceptable  ? Low-dose metoprolol-on hold   ? Cardiology follow-up  ? Diuresing-Bumex 4 mg IV b i d  and PD     4  Anemia of CKD  ? Trend hemoglobin  ? Received course of Venofer  ? Thrombocytopenia-platelets pre PD catheter placement     5  CKD-BMD  ? Hyperphosphatemia-sevelemer 800 mg t i d      6  Clinical Course/CV Risk Reduction/Health maintenance/communication  ? Diastolic heart failure with an ejection fraction of 60%  ?  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    SUBJECTIVE:    Patient was seen today  Feels well   No abd pain  On cycler did have some fibrin but this has improved  No cp, sob, fevers, chills    OBJECTIVE:  Current Weight: Weight - Scale: 77 2 kg (170 lb 3 1 oz)  @  Vitals:    02/04/21 2321 02/04/21 2322 02/05/21 0538 02/05/21 0755   BP:  105/54  109/57   BP Location:       Pulse:  68  69   Resp:    20   Temp: 97 8 °F (36 6 °C)   97 5 °F (36 4 °C)   TempSrc:       SpO2:  95%  100%   Weight:   77 2 kg (170 lb 3 1 oz)    Height:           Intake/Output Summary (Last 24 hours) at 2/5/2021 1406  Last data filed at 2/5/2021 0815  Gross per 24 hour   Intake 920 ml   Output 1850 ml   Net -930 ml     Weight (last 2 days)     Date/Time   Weight    02/05/21 0538   77 2 (170 2)    02/04/21 0541   77 5 (170 86)              General: conscious, cooperative, in no acute distress  Eyes: conjunctivae pink, anicteric sclerae  ENT: lips and mucous membranes moist  Neck: supple, + JVD  Chest: no respiratory distress, no accessory muscle use, normal respiratory effort  CVS: normal heart rate, no friction rub  Abdomen: distended  Extremities: no edema of both legs  Skin: no rash  Neuro: awake, alert, oriented      Medications:    Current Facility-Administered Medications:     acetaminophen (TYLENOL) tablet 650 mg, 650 mg, Oral, Q6H PRN, EL Roth    atorvastatin (LIPITOR) tablet 40 mg, 40 mg, Oral, Daily, EL Roth, 40 mg at 02/05/21 0826    benzonatate (TESSALON PERLES) capsule 100 mg, 100 mg, Oral, TID PRN, Brittany Sears PA-C, 100 mg at 02/05/21 0835    bumetanide (BUMEX) injection 4 mg, 4 mg, Intravenous, BID, EL Sanabria, 4 mg at 02/05/21 0914    guaiFENesin (MUCINEX) 12 hr tablet 600 mg, 600 mg, Oral, Q12H [address], [place] CHIDI Santos PA-C, 600 mg at 02/05/21 1076    HYDROmorphone (DILAUDID) injection 0 5 mg, 0 5 mg, Intravenous, Q2H PRN, Mike Leyva MD    melatonin tablet 6 mg, 6 mg, Oral, HS, EL Roth, 6 mg at 02/04/21 2114    ondansetron (ZOFRAN) injection 4 mg, 4 mg, Intravenous, Q6H PRN, EL Roth    oxyCODONE (ROXICODONE) immediate release tablet 10 mg, 10 mg, Oral, Q4H PRN, Mike Leyva MD, 10 mg at 02/04/21 2325    oxyCODONE (ROXICODONE) IR tablet 5 mg, 5 mg, Oral, Q4H PRN, Mike Bhatiapin, MD    sevelamer (RENAGEL) tablet 800 mg, 800 mg, Oral, TID With Meals, Ernie Torres DO, 800 mg at 02/05/21 1206    Insert peripheral IV, , , Once **AND** sodium chloride (PF) 0 9 % injection 3 mL, 3 mL, Intravenous, Q1H PRN, EL Roth    traZODone (DESYREL) tablet 25 mg, 25 mg, Oral, HS, Char DE ANDA Britni, EL, 25 mg at 02/04/21 2114    warfarin (COUMADIN) tablet 2 mg, 2 mg, Oral, Daily (warfarin), Nicole Hogue PA-C, 2 mg at 02/04/21 1721     Lab Results:   Results from last 7 days   Lab Units 02/05/21  0453 02/04/21  0516 02/03/21  0545 02/02/21  0704 02/01/21  0512  01/30/21  0527   WBC Thousand/uL 5 86 5 81  --   --   --   --   --    HEMOGLOBIN g/dL 8 2* 8 2*  --   --   --   --   --    HEMATOCRIT % 25 9* 26 1*  --   --   --   --   --    PLATELETS Thousands/uL 81* 88*  --   --   --   --   --    POTASSIUM mmol/L 3 7 4 3 4 6 4 2 4 0   < > 3 3*   CHLORIDE mmol/L 103 103 103 104 106   < > 102   CO2 mmol/L 30 27 28 28 31   < > 31   BUN mg/dL 122* 129* 129* 121* 119*   < > 112*   CREATININE mg/dL 4 77* 4 86* 4 53* 4 31* 3 91*   < > 3 73*   CALCIUM mg/dL 8 7 9 1 9 7 9 5 9 3   < > 9 1   PHOSPHORUS mg/dL  --   --   --   --   --   --  5 4*   ALK PHOS U/L 81  --   --   --   --   --   --    ALT U/L 15  --   --   --   --   --   --    AST U/L 26  --   --   --   --   --   --     < > = values in this interval not displayed  Portions of the record may have been created with voice recognition software  Occasional wrong word or "sound a like" substitutions may have occurred due to the inherent limitations of voice recognition software  Read the chart carefully and recognize, using context, where substitutions have occurred  If you have any questions, please contact the dictating provider

## 2021-02-05 NOTE — PROGRESS NOTES
Progress Note - Oliver Sharpe 1940, 68 y o  male MRN: [id number]    Unit/Bed#: [facility] 806-01 Encounter: [id number]    Primary Care Provider: EL Villalobos   Date and time admitted to hospital: 1/26/2021  1:24 PM        * Acute on chronic diastolic (congestive) heart failure (HCC)  Assessment & Plan  Wt Readings from Last 3 Encounters:   02/05/21 77 2 kg (170 lb 3 1 oz)   01/19/21 80 3 kg (177 lb)   01/11/21 78 9 kg (174 lb)     Volume management on peritoneal dialysis  Cardiology following  Monitor daily weights       Atrial fibrillation (HCC)  Assessment & Plan  · Rate controlled on Lopressor  · Anticoagulated on Coumadin  Initially held secondary to supratherapeutic INR  · Monitor INR    Stage 4 chronic kidney disease [facility])  Assessment & Plan  Lab Results   Component Value Date    EGFR 11 02/05/2021    EGFR 11 02/04/2021    EGFR 12 02/03/2021    CREATININE 4 77 (H) 02/05/2021    CREATININE 4 86 (H) 02/04/2021    CREATININE 4 53 (H) 02/03/2021   ·   · Baseline creatinine 2-2 9 approximately  4 19 on presentation  · Holding norvasc 2/2 soft BP  · Known to Dr Tammi De Jesus as outpatient  · Peritoneal dialysis per nephrology  · Nephrology following    Anemia  Assessment & Plan  · Multifactorial  · Monitor hemoglobin closely    Hyperlipidemia  Assessment & Plan  · Continue statin    Dissection of thoracic aorta [facility])  Assessment & Plan  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  · 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  Risk associated with surgical options therefore recommended continued observation      Bradycardia  Assessment & Plan  · Beta-blocker on hold  · Monitor closely  · Cardiology following    Benign essential hypertension  Assessment & Plan  Monitor blood pressures  Norvasc and Lopressor on hold  Avoid hypotension          VTE Pharmacologic Prophylaxis:   Pharmacologic: Warfarin (Coumadin)  Mechanical VTE Prophylaxis in Place: Yes    Patient Centered Rounds: I have performed bedside rounds with nursing staff today  Discussions with Specialists or Other Care Team Provider:     Education and Discussions with Family / Patient:  Patient, updated daughter Katelin Long     Time Spent for Care: 30 minutes  More than 50% of total time spent on counseling and coordination of care as described above  Current Length of Stay: 10 day(s)    Current Patient Status: Inpatient   Certification Statement: The patient will continue to require additional inpatient hospital stay due to as outlined    Discharge Plan: awaiting clinical and symptomatic improvement    Code Status: Level 1 - Full Code      Subjective:     Comfortably lying in bed  Reports feeling okay  History chart labs medications reviewed    Objective:     Vitals:   Temp (24hrs), Av 3 °F (36 8 °C), Min:97 5 °F (36 4 °C), Max:99 °F (37 2 °C)    Temp:  [97 5 °F (36 4 °C)-99 °F (37 2 °C)] 97 5 °F (36 4 °C)  HR:  [68-80] 69  Resp:  [18-20] 20  BP: (105-123)/(54-68) 109/57  SpO2:  [95 %-100 %] 100 %  Body mass index is 26 66 kg/m²  Input and Output Summary (last 24 hours):        Intake/Output Summary (Last 24 hours) at 2021 1515  Last data filed at 2021 1300  Gross per 24 hour   Intake 1038 ml   Output 1850 ml   Net -812 ml       Physical Exam:     Physical Exam    Comfortably lying in bed  Neck supple  Lungs diminished breath sounds bilateral  Heart sounds S1-S2 noted  Abdomen soft nontender  Awake obeys simple commands  Pulses noted  No rash    Additional Data:     Labs:    Results from last 7 days   Lab Units 21  0453   WBC Thousand/uL 5 86   HEMOGLOBIN g/dL 8 2*   HEMATOCRIT % 25 9*   PLATELETS Thousands/uL 81*   NEUTROS PCT % 65   LYMPHS PCT % 13*   MONOS PCT % 18*   EOS PCT % 4     Results from last 7 days   Lab Units 21  0453   SODIUM mmol/L 139   POTASSIUM mmol/L 3 7   CHLORIDE mmol/L 103   CO2 mmol/L 30   BUN mg/dL 122*   CREATININE mg/dL 4 77*   ANION GAP mmol/L 6   CALCIUM mg/dL 8 7   ALBUMIN g/dL 2 7*   TOTAL BILIRUBIN mg/dL 0 79   ALK PHOS U/L 81   ALT U/L 15   AST U/L 26   GLUCOSE RANDOM mg/dL 108     Results from last 7 days   Lab Units 02/05/21  0453   INR  2 03*                       * I Have Reviewed All Lab Data Listed Above  * Additional Pertinent Lab Tests Reviewed: All Labs Within Last 24 Hours Reviewed    Imaging:    Imaging Reports Reviewed Today Include:  Imaging studies noted  Imaging Personally Reviewed by Myself Includes:     Recent Cultures (last 7 days):           Last 24 Hours Medication List:   Current Facility-Administered Medications   Medication Dose Route Frequency Provider Last Rate    acetaminophen  650 mg Oral Q6H PRN EL Roth      atorvastatin  40 mg Oral Daily EL Roth      benzonatate  100 mg Oral TID PRN Brittany Sears PA-C      bumetanide  4 mg Intravenous BID EL Agrawal      peritoneal dialysis fluid builder (ambulatory)   [facility],       guaiFENesin  600 mg Oral Q12H [facility] CHIDI Santos PA-C      melatonin  6 mg Oral HS EL Roth      ondansetron  4 mg Intravenous Q6H PRN EL Resendiz      oxyCODONE  10 mg Oral Q4H PRN Dariusz Alfredo MD      oxyCODONE  5 mg Oral Q4H PRN Dariusz Alfredo MD      sevelamer  800 mg Oral TID With Meals Whit Torres,       sodium chloride (PF)  3 mL Intravenous Q1H PRN EL Roth      traZODone  25 mg Oral HS EL Roth      warfarin  2 mg Oral Daily (warfarin) Bigg Joiner PA-C          Today, Patient Was Seen By: Sue Juarez MD    ** Please Note: Dictation voice to text software may have been used in the creation of this document   **

## 2021-02-05 NOTE — TREATMENT TEAM
Renal on call note    Late note entry - poor drainage from cycler  Attempted manual drain and HOB max 30 degrees, minimal drainage   Large fibrin    Held cycler    Manual exchange ordered at 2 am with heparin with low dose dwell

## 2021-02-05 NOTE — NURSING NOTE
After manual drain on cycler, total PD fluid out was 482ml out of 1000ml fill  Cycler stopped at this time  Large amount of fibrin noted in bag  Dr Shubham Anthony made aware  Will attempt manual drain using ultra bag set up, per physician

## 2021-02-05 NOTE — NURSING NOTE
Additional 200ml yellow peritoneal fluid returned from manual drain  Additional small dime sized piece of fibrin noted  Awaiting orders from Dr Margarita Nguyen for additional manual exchange with heparin added

## 2021-02-05 NOTE — ASSESSMENT & PLAN NOTE
Wt Readings from Last 3 Encounters:   02/05/21 77 2 kg (170 lb 3 1 oz)   01/19/21 80 3 kg (177 lb)   01/11/21 78 9 kg (174 lb)     Volume management on peritoneal dialysis  Cardiology following  Monitor daily weights

## 2021-02-05 NOTE — ASSESSMENT & PLAN NOTE
· Rate controlled on Lopressor  · Anticoagulated on Coumadin  Initially held secondary to supratherapeutic INR      · Monitor INR

## 2021-02-05 NOTE — PLAN OF CARE
Problem: Potential for Falls  Goal: Patient will remain free of falls  Description: INTERVENTIONS:  - Assess patient frequently for physical needs  -  Identify cognitive and physical deficits and behaviors that affect risk of falls    -  [place] fall precautions as indicated by assessment   - Educate patient/family on patient safety including physical limitations  - Instruct patient to call for assistance with activity based on assessment  - Modify environment to reduce risk of injury  - Consider OT/PT consult to assist with strengthening/mobility  Outcome: Progressing     Problem: CARDIOVASCULAR - ADULT  Goal: Maintains optimal cardiac output and hemodynamic stability  Description: INTERVENTIONS:  - Monitor I/O, vital signs and rhythm  - Monitor for S/S and trends of decreased cardiac output  - Administer and titrate ordered vasoactive medications to optimize hemodynamic stability  - Assess quality of pulses, skin color and temperature  - Assess for signs of decreased coronary artery perfusion  - Instruct patient to report change in severity of symptoms  Outcome: Progressing     Problem: METABOLIC, FLUID AND ELECTROLYTES - ADULT  Goal: Electrolytes maintained within normal limits  Description: INTERVENTIONS:  - Monitor labs and assess patient for signs and symptoms of electrolyte imbalances  - Administer electrolyte replacement as ordered  - Monitor response to electrolyte replacements, including repeat lab results as appropriate  - Instruct patient on fluid and nutrition as appropriate  Outcome: Progressing  Goal: Fluid balance maintained  Description: INTERVENTIONS:  - Monitor labs   - Monitor I/O and WT  - Instruct patient on fluid and nutrition as appropriate  - Assess for signs & symptoms of volume excess or deficit  Outcome: Progressing     Problem: MUSCULOSKELETAL - ADULT  Goal: Maintain or return mobility to safest level of function  Description: INTERVENTIONS:  - Assess patient's ability to carry out ADLs; assess patient's baseline for ADL function and identify physical deficits which impact ability to perform ADLs (bathing, care of mouth/teeth, toileting, grooming, dressing, etc )  - Assess/evaluate cause of self-care deficits   - Assess range of motion  - Assess patient's mobility  - Assess patient's need for assistive devices and provide as appropriate  - Encourage maximum independence but intervene and supervise when necessary  - Involve family in performance of ADLs  - Assess for home care needs following discharge   - Consider OT consult to assist with ADL evaluation and planning for discharge  - Provide patient education as appropriate  Outcome: Progressing     Problem: Nutrition/Hydration-ADULT  Goal: Nutrient/Hydration intake appropriate for improving, restoring or maintaining nutritional needs  Description: Monitor and assess patient's nutrition/hydration status for malnutrition  Collaborate with interdisciplinary team and initiate plan and interventions as ordered  Monitor patient's weight and dietary intake as ordered or per policy  Utilize nutrition screening tool and intervene as necessary  Determine patient's food preferences and provide high-protein, high-caloric foods as appropriate       INTERVENTIONS:  - Monitor oral intake, urinary output, labs, and treatment plans  - Assess nutrition and hydration status and recommend course of action  - Evaluate amount of meals eaten  - Assist patient with eating if necessary   - Allow adequate time for meals  - Recommend/ encourage appropriate diets, oral nutritional supplements, and vitamin/mineral supplements  - Order, calculate, and assess calorie counts as needed  - Recommend, monitor, and adjust tube feedings and TPN/PPN based on assessed needs  - Assess need for intravenous fluids  - Provide specific nutrition/hydration education as appropriate  - Include patient/family/caregiver in decisions related to nutrition  Outcome: Progressing     Problem: PAIN - ADULT  Goal: Verbalizes/displays adequate comfort level or baseline comfort level  Description: Interventions:  - Encourage patient to monitor pain and request assistance  - Assess pain using appropriate pain scale  - Administer analgesics based on type and severity of pain and evaluate response  - Implement non-pharmacological measures as appropriate and evaluate response  - Consider cultural and social influences on pain and pain management  - Notify physician/advanced practitioner if interventions unsuccessful or patient reports new pain  Outcome: Progressing     Problem: INFECTION - ADULT  Goal: Absence or prevention of progression during hospitalization  Description: INTERVENTIONS:  - Assess and monitor for signs and symptoms of infection  - Monitor lab/diagnostic results  - Monitor all insertion sites, i e  indwelling lines, tubes, and drains  - Monitor endotracheal if appropriate and nasal secretions for changes in amount and color  - [place] appropriate cooling/warming therapies per order  - Administer medications as ordered  - Instruct and encourage patient and family to use good hand hygiene technique  - Identify and instruct in appropriate isolation precautions for identified infection/condition  Outcome: Progressing     Problem: SAFETY ADULT  Goal: Patient will remain free of falls  Description: INTERVENTIONS:  - Assess patient frequently for physical needs  -  Identify cognitive and physical deficits and behaviors that affect risk of falls    -  [place] fall precautions as indicated by assessment   - Educate patient/family on patient safety including physical limitations  - Instruct patient to call for assistance with activity based on assessment  - Modify environment to reduce risk of injury  - Consider OT/PT consult to assist with strengthening/mobility  Outcome: Progressing  Goal: Maintain or return to baseline ADL function  Description: INTERVENTIONS:  -  Assess patient's ability to carry out ADLs; assess patient's baseline for ADL function and identify physical deficits which impact ability to perform ADLs (bathing, care of mouth/teeth, toileting, grooming, dressing, etc )  - Assess/evaluate cause of self-care deficits   - Assess range of motion  - Assess patient's mobility; develop plan if impaired  - Assess patient's need for assistive devices and provide as appropriate  - Encourage maximum independence but intervene and supervise when necessary  - Involve family in performance of ADLs  - Assess for home care needs following discharge   - Consider OT consult to assist with ADL evaluation and planning for discharge  - Provide patient education as appropriate  Outcome: Progressing  Goal: Maintain or return mobility status to optimal level  Description: INTERVENTIONS:  - Assess patient's baseline mobility status (ambulation, transfers, stairs, etc )    - Identify cognitive and physical deficits and behaviors that affect mobility  - Identify mobility aids required to assist with transfers and/or ambulation (gait belt, sit-to-stand, lift, walker, cane, etc )  - [place] fall precautions as indicated by assessment  - Record patient progress and toleration of activity level on Mobility SBAR; progress patient to next Phase/Stage  - Instruct patient to call for assistance with activity based on assessment  - Consider rehabilitation consult to assist with strengthening/weightbearing, etc   Outcome: Progressing     Problem: DISCHARGE PLANNING  Goal: Discharge to home or other facility with appropriate resources  Description: INTERVENTIONS:  - Identify barriers to discharge w/patient and caregiver  - Arrange for needed discharge resources and transportation as appropriate  - Identify discharge learning needs (meds, wound care, etc )  - Arrange for interpretive services to assist at discharge as needed  - Refer to Case Management Department for coordinating discharge planning if the patient needs post-hospital services based on physician/advanced practitioner order or complex needs related to functional status, cognitive ability, or social support system  Outcome: Progressing     Problem: Knowledge Deficit  Goal: Patient/family/caregiver demonstrates understanding of disease process, treatment plan, medications, and discharge instructions  Description: Complete learning assessment and assess knowledge base    Interventions:  - Provide teaching at level of understanding  - Provide teaching via preferred learning methods  Outcome: Progressing

## 2021-02-06 ENCOUNTER — APPOINTMENT (INPATIENT)
Dept: RADIOLOGY | Facility: HOSPITAL | Age: 81
DRG: 981 | End: 2021-02-06
Payer: COMMERCIAL

## 2021-02-06 LAB
ANION GAP SERPL CALCULATED.3IONS-SCNC: 8 MMOL/L (ref 4–13)
BUN SERPL-MCNC: 108 MG/DL (ref 5–25)
CALCIUM SERPL-MCNC: 9.5 MG/DL (ref 8.3–10.1)
CHLORIDE SERPL-SCNC: 107 MMOL/L (ref 100–108)
CO2 SERPL-SCNC: 24 MMOL/L (ref 21–32)
CREAT SERPL-MCNC: 4.19 MG/DL (ref 0.6–1.3)
GFR SERPL CREATININE-BSD FRML MDRD: 13 ML/MIN/1.73SQ M
GLUCOSE SERPL-MCNC: 96 MG/DL (ref 65–140)
INR PPP: 1.84 (ref 0.84–1.19)
POTASSIUM SERPL-SCNC: 4.1 MMOL/L (ref 3.5–5.3)
PROTHROMBIN TIME: 21.2 SECONDS (ref 11.6–14.5)
SODIUM SERPL-SCNC: 139 MMOL/L (ref 136–145)

## 2021-02-06 PROCEDURE — 85610 PROTHROMBIN TIME: CPT | Performed by: INTERNAL MEDICINE

## 2021-02-06 PROCEDURE — 99232 SBSQ HOSP IP/OBS MODERATE 35: CPT | Performed by: INTERNAL MEDICINE

## 2021-02-06 PROCEDURE — 80048 BASIC METABOLIC PNL TOTAL CA: CPT | Performed by: INTERNAL MEDICINE

## 2021-02-06 PROCEDURE — 99233 SBSQ HOSP IP/OBS HIGH 50: CPT | Performed by: INTERNAL MEDICINE

## 2021-02-06 PROCEDURE — 74018 RADEX ABDOMEN 1 VIEW: CPT

## 2021-02-06 RX ORDER — BUMETANIDE 1 MG/1
4 TABLET ORAL DAILY
Status: DISCONTINUED | OUTPATIENT
Start: 2021-02-06 | End: 2021-02-06

## 2021-02-06 RX ORDER — POLYETHYLENE GLYCOL 3350 17 G/17G
17 POWDER, FOR SOLUTION ORAL DAILY PRN
Status: DISCONTINUED | OUTPATIENT
Start: 2021-02-06 | End: 2021-02-12

## 2021-02-06 RX ORDER — BUMETANIDE 1 MG/1
4 TABLET ORAL 2 TIMES DAILY
Status: DISCONTINUED | OUTPATIENT
Start: 2021-02-06 | End: 2021-02-17 | Stop reason: HOSPADM

## 2021-02-06 RX ADMIN — SEVELAMER HYDROCHLORIDE 800 MG: 800 TABLET, FILM COATED PARENTERAL at 11:33

## 2021-02-06 RX ADMIN — GUAIFENESIN 600 MG: 600 TABLET, EXTENDED RELEASE ORAL at 08:26

## 2021-02-06 RX ADMIN — TRAZODONE HYDROCHLORIDE 25 MG: 50 TABLET ORAL at 21:01

## 2021-02-06 RX ADMIN — GUAIFENESIN 600 MG: 600 TABLET, EXTENDED RELEASE ORAL at 21:01

## 2021-02-06 RX ADMIN — WARFARIN SODIUM 2 MG: 2 TABLET ORAL at 17:15

## 2021-02-06 RX ADMIN — BENZONATATE 100 MG: 100 CAPSULE ORAL at 17:15

## 2021-02-06 RX ADMIN — SEVELAMER HYDROCHLORIDE 800 MG: 800 TABLET, FILM COATED PARENTERAL at 08:26

## 2021-02-06 RX ADMIN — BENZONATATE 100 MG: 100 CAPSULE ORAL at 08:26

## 2021-02-06 RX ADMIN — MELATONIN TAB 3 MG 6 MG: 3 TAB at 21:01

## 2021-02-06 RX ADMIN — BUMETANIDE 4 MG: 0.25 INJECTION INTRAMUSCULAR; INTRAVENOUS at 08:27

## 2021-02-06 RX ADMIN — SEVELAMER HYDROCHLORIDE 800 MG: 800 TABLET, FILM COATED PARENTERAL at 17:15

## 2021-02-06 RX ADMIN — ATORVASTATIN CALCIUM 40 MG: 40 TABLET, FILM COATED ORAL at 08:26

## 2021-02-06 RX ADMIN — BUMETANIDE 4 MG: 1 TABLET ORAL at 17:15

## 2021-02-06 NOTE — ASSESSMENT & PLAN NOTE
Wt Readings from Last 3 Encounters:   02/06/21 79 kg (174 lb 2 6 oz)   01/19/21 80 3 kg (177 lb)   01/11/21 78 9 kg (174 lb)     Volume management on peritoneal dialysis  Cardiology following  Monitor daily weights

## 2021-02-06 NOTE — PROGRESS NOTES
NEPHROLOGY PROGRESS NOTE   Oliver Sharpe 68 y o  male MRN: [id number]  Unit/Bed#: [facility] 806-01 Encounter: [id number]      ASSESSMENT & PLAN    1  AFSHIN (POA) on stage 4 chronic kidney disease with a baseline creatinine in the mid 2s and an estimated GFR in the low 20 now with progressive CKD  ? He has required hemodialysis in the past  ? Symptomatically worse with a creatinine that has increased  ? Started PD last night with fibrin, heparin added  ? With uremic symptoms and volume overload: indication to start PD  ? IV Bumex initiated by Cardiology will switch to PO  ? Appreciate interventional radiology placement of PD catheter  ? CCPD 6 hours 1 5% 1 25 L exchanges every 2 hours with with heparin x 3 exchanges  ? Checking KUB for catheter placement overnight with slow drain  ? Bowel regimen     2  Electrolytes/Acid Base  ? Hypokalemia-monitor now with PD stable     3  Blood Pressure-hypotension  ? Maps are acceptable  ? Low-dose metoprolol-on hold   ? Cardiology follow-up  ? Diuresing-bumex 4 mg po bid     4  Anemia of CKD  ? Trend hemoglobin  ? Received course of Venofer  ? Thrombocytopenia-platelets pre PD catheter placement     5  CKD-BMD  ? Hyperphosphatemia-sevelemer 800 mg t i d      6  Clinical Course/CV Risk Reduction/Health maintenance/communication  ? Diastolic heart failure with an ejection fraction of 60%  ?  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       SUBJECTIVE:    Patient seen today  Slow drain on PD last night  Had to do a manual      OBJECTIVE:  Current Weight: Weight - Scale: 79 kg (174 lb 2 6 oz)  @  Vitals:    02/06/21 0152 02/06/21 0433 02/06/21 0600 02/06/21 0745   BP: 100/54 110/66  110/62   Pulse: 71   71   Resp:    20   Temp: 98 2 °F (36 8 °C)   98 2 °F (36 8 °C)   TempSrc:       SpO2: 93%   98%   Weight:   79 kg (174 lb 2 6 oz)    Height:           Intake/Output Summary (Last 24 hours) at 2/6/2021 1136  Last data filed at 2/6/2021 0901  Gross per 24 hour   Intake 358 ml   Output 1720 ml   Net -1362 ml     Weight (last 2 days)     Date/Time   Weight    02/06/21 0600   79 (174 16)    02/05/21 0538   77 2 (170 2)    02/04/21 0541   77 5 (170 86)              General: conscious, cooperative, in no acute distress  Eyes: conjunctivae pink, anicteric sclerae  ENT: lips and mucous membranes moist  Neck: supple, no JVD  Chest: no respiratory distress, no accessory muscle use, normal respiratory effort  CVS: normal heart rate, no friction rub  Abdomen: soft, non-tender, non-distended, normoactive bowel sounds  Extremities: No Abdominal pain  Skin: no rash  Neuro: awake, alert, oriented      Medications:    Current Facility-Administered Medications:     acetaminophen (TYLENOL) tablet 650 mg, 650 mg, Oral, Q6H PRN, EL Roth    atorvastatin (LIPITOR) tablet 40 mg, 40 mg, Oral, Daily, EL Roth, 40 mg at 02/06/21 0826    benzonatate (TESSALON PERLES) capsule 100 mg, 100 mg, Oral, TID PRN, Brittany Sears PA-C, 100 mg at 02/06/21 0826    bumetanide (BUMEX) tablet 4 mg, 4 mg, Oral, BID, Lissy Tovar DO    guaiFENesin (MUCINEX) 12 hr tablet 600 mg, 600 mg, Oral, Q12H [facility] TRINY Santos, 600 mg at 02/06/21 6395    melatonin tablet 6 mg, 6 mg, Oral, HS, EL Roth, 6 mg at 02/04/21 2114    ondansetron (ZOFRAN) injection 4 mg, 4 mg, Intravenous, Q6H PRN, EL Roth    oxyCODONE (ROXICODONE) immediate release tablet 10 mg, 10 mg, Oral, Q4H PRN, Toño Spivey MD, 10 mg at 02/04/21 7219    oxyCODONE (ROXICODONE) IR tablet 5 mg, 5 mg, Oral, Q4H PRN, Toño Spivey MD    sevelamer (RENAGEL) tablet 800 mg, 800 mg, Oral, TID With Meals, Clearnce Terrance Torres DO, 800 mg at 02/06/21 1133    Insert peripheral IV, , , Once **AND** sodium chloride (PF) 0 9 % injection 3 mL, 3 mL, Intravenous, Q1H PRN, EL Roth    traZODone (DESYREL) tablet 25 mg, 25 mg, Oral, HS, Char M EL Ryder, 25 mg at 02/04/21 2114    warfarin (COUMADIN) tablet 2 mg, 2 mg, Oral, Daily (warfarin), Bishop Siemens, PA-C, 2 mg at 02/05/21 1726    I  Lab Results:   Results from last 7 days   Lab Units 02/06/21  0519 02/05/21  0453 02/04/21  0516 02/03/21  0545 02/02/21  0704   WBC Thousand/uL  --  5 86 5 81  --   --    HEMOGLOBIN g/dL  --  8 2* 8 2*  --   --    HEMATOCRIT %  --  25 9* 26 1*  --   --    PLATELETS Thousands/uL  --  81* 88*  --   --    POTASSIUM mmol/L 4 1 3 7 4 3 4 6 4 2   CHLORIDE mmol/L 107 103 103 103 104   CO2 mmol/L 24 30 27 28 28   BUN mg/dL 108* 122* 129* 129* 121*   CREATININE mg/dL 4 19* 4 77* 4 86* 4 53* 4 31*   CALCIUM mg/dL 9 5 8 7 9 1 9 7 9 5   ALK PHOS U/L  --  81  --   --   --    ALT U/L  --  15  --   --   --    AST U/L  --  26  --   --   --          Portions of the record may have been created with voice recognition software  Occasional wrong word or "sound a like" substitutions may have occurred due to the inherent limitations of voice recognition software  Read the chart carefully and recognize, using context, where substitutions have occurred  If you have any questions, please contact the dictating provider

## 2021-02-06 NOTE — PLAN OF CARE
Problem: Potential for Falls  Goal: Patient will remain free of falls  Description: INTERVENTIONS:  - Assess patient frequently for physical needs  -  Identify cognitive and physical deficits and behaviors that affect risk of falls    -  [place] fall precautions as indicated by assessment   - Educate patient/family on patient safety including physical limitations  - Instruct patient to call for assistance with activity based on assessment  - Modify environment to reduce risk of injury  - Consider OT/PT consult to assist with strengthening/mobility  Outcome: Progressing     Problem: CARDIOVASCULAR - ADULT  Goal: Maintains optimal cardiac output and hemodynamic stability  Description: INTERVENTIONS:  - Monitor I/O, vital signs and rhythm  - Monitor for S/S and trends of decreased cardiac output  - Administer and titrate ordered vasoactive medications to optimize hemodynamic stability  - Assess quality of pulses, skin color and temperature  - Assess for signs of decreased coronary artery perfusion  - Instruct patient to report change in severity of symptoms  Outcome: Progressing     Problem: METABOLIC, FLUID AND ELECTROLYTES - ADULT  Goal: Electrolytes maintained within normal limits  Description: INTERVENTIONS:  - Monitor labs and assess patient for signs and symptoms of electrolyte imbalances  - Administer electrolyte replacement as ordered  - Monitor response to electrolyte replacements, including repeat lab results as appropriate  - Instruct patient on fluid and nutrition as appropriate  Outcome: Progressing  Goal: Fluid balance maintained  Description: INTERVENTIONS:  - Monitor labs   - Monitor I/O and WT  - Instruct patient on fluid and nutrition as appropriate  - Assess for signs & symptoms of volume excess or deficit  Outcome: Progressing     Problem: MUSCULOSKELETAL - ADULT  Goal: Maintain or return mobility to safest level of function  Description: INTERVENTIONS:  - Assess patient's ability to carry out ADLs; assess patient's baseline for ADL function and identify physical deficits which impact ability to perform ADLs (bathing, care of mouth/teeth, toileting, grooming, dressing, etc )  - Assess/evaluate cause of self-care deficits   - Assess range of motion  - Assess patient's mobility  - Assess patient's need for assistive devices and provide as appropriate  - Encourage maximum independence but intervene and supervise when necessary  - Involve family in performance of ADLs  - Assess for home care needs following discharge   - Consider OT consult to assist with ADL evaluation and planning for discharge  - Provide patient education as appropriate  Outcome: Progressing     Problem: Nutrition/Hydration-ADULT  Goal: Nutrient/Hydration intake appropriate for improving, restoring or maintaining nutritional needs  Description: Monitor and assess patient's nutrition/hydration status for malnutrition  Collaborate with interdisciplinary team and initiate plan and interventions as ordered  Monitor patient's weight and dietary intake as ordered or per policy  Utilize nutrition screening tool and intervene as necessary  Determine patient's food preferences and provide high-protein, high-caloric foods as appropriate       INTERVENTIONS:  - Monitor oral intake, urinary output, labs, and treatment plans  - Assess nutrition and hydration status and recommend course of action  - Evaluate amount of meals eaten  - Assist patient with eating if necessary   - Allow adequate time for meals  - Recommend/ encourage appropriate diets, oral nutritional supplements, and vitamin/mineral supplements  - Order, calculate, and assess calorie counts as needed  - Recommend, monitor, and adjust tube feedings and TPN/PPN based on assessed needs  - Assess need for intravenous fluids  - Provide specific nutrition/hydration education as appropriate  - Include patient/family/caregiver in decisions related to nutrition  Outcome: Progressing     Problem: PAIN - ADULT  Goal: Verbalizes/displays adequate comfort level or baseline comfort level  Description: Interventions:  - Encourage patient to monitor pain and request assistance  - Assess pain using appropriate pain scale  - Administer analgesics based on type and severity of pain and evaluate response  - Implement non-pharmacological measures as appropriate and evaluate response  - Consider cultural and social influences on pain and pain management  - Notify physician/advanced practitioner if interventions unsuccessful or patient reports new pain  Outcome: Progressing     Problem: INFECTION - ADULT  Goal: Absence or prevention of progression during hospitalization  Description: INTERVENTIONS:  - Assess and monitor for signs and symptoms of infection  - Monitor lab/diagnostic results  - Monitor all insertion sites, i e  indwelling lines, tubes, and drains  - Monitor endotracheal if appropriate and nasal secretions for changes in amount and color  - [place] appropriate cooling/warming therapies per order  - Administer medications as ordered  - Instruct and encourage patient and family to use good hand hygiene technique  - Identify and instruct in appropriate isolation precautions for identified infection/condition  Outcome: Progressing     Problem: SAFETY ADULT  Goal: Patient will remain free of falls  Description: INTERVENTIONS:  - Assess patient frequently for physical needs  -  Identify cognitive and physical deficits and behaviors that affect risk of falls    -  [place] fall precautions as indicated by assessment   - Educate patient/family on patient safety including physical limitations  - Instruct patient to call for assistance with activity based on assessment  - Modify environment to reduce risk of injury  - Consider OT/PT consult to assist with strengthening/mobility  Outcome: Progressing  Goal: Maintain or return to baseline ADL function  Description: INTERVENTIONS:  -  Assess patient's ability to carry out ADLs; assess patient's baseline for ADL function and identify physical deficits which impact ability to perform ADLs (bathing, care of mouth/teeth, toileting, grooming, dressing, etc )  - Assess/evaluate cause of self-care deficits   - Assess range of motion  - Assess patient's mobility; develop plan if impaired  - Assess patient's need for assistive devices and provide as appropriate  - Encourage maximum independence but intervene and supervise when necessary  - Involve family in performance of ADLs  - Assess for home care needs following discharge   - Consider OT consult to assist with ADL evaluation and planning for discharge  - Provide patient education as appropriate  Outcome: Progressing  Goal: Maintain or return mobility status to optimal level  Description: INTERVENTIONS:  - Assess patient's baseline mobility status (ambulation, transfers, stairs, etc )    - Identify cognitive and physical deficits and behaviors that affect mobility  - Identify mobility aids required to assist with transfers and/or ambulation (gait belt, sit-to-stand, lift, walker, cane, etc )  - [place] fall precautions as indicated by assessment  - Record patient progress and toleration of activity level on Mobility SBAR; progress patient to next Phase/Stage  - Instruct patient to call for assistance with activity based on assessment  - Consider rehabilitation consult to assist with strengthening/weightbearing, etc   Outcome: Progressing     Problem: DISCHARGE PLANNING  Goal: Discharge to home or other facility with appropriate resources  Description: INTERVENTIONS:  - Identify barriers to discharge w/patient and caregiver  - Arrange for needed discharge resources and transportation as appropriate  - Identify discharge learning needs (meds, wound care, etc )  - Arrange for interpretive services to assist at discharge as needed  - Refer to Case Management Department for coordinating discharge planning if the patient needs post-hospital services based on physician/advanced practitioner order or complex needs related to functional status, cognitive ability, or social support system  Outcome: Progressing     Problem: Knowledge Deficit  Goal: Patient/family/caregiver demonstrates understanding of disease process, treatment plan, medications, and discharge instructions  Description: Complete learning assessment and assess knowledge base    Interventions:  - Provide teaching at level of understanding  - Provide teaching via preferred learning methods  Outcome: Progressing

## 2021-02-06 NOTE — PROGRESS NOTES
Progress Note - Oliver Sharpe 1940, 68 y o  male MRN: [id number]    Unit/Bed#: [facility] 806-01 Encounter: [id number]    Primary Care Provider: EL Monte   Date and time admitted to hospital: 1/26/2021  1:24 PM        * Acute on chronic diastolic (congestive) heart failure (HCC)  Assessment & Plan  Wt Readings from Last 3 Encounters:   02/06/21 79 kg (174 lb 2 6 oz)   01/19/21 80 3 kg (177 lb)   01/11/21 78 9 kg (174 lb)     Volume management on peritoneal dialysis  Cardiology following  Monitor daily weights       Atrial fibrillation (HCC)  Assessment & Plan  · Rate controlled on Lopressor  · Anticoagulated on Coumadin  · Discussed with nephrology patient is scheduled for peritoneal dialysis catheter change on Tuesday - communicated with Cardiology via [facility] connect awaiting inputs regarding anticoagulation plan    Stage 4 chronic kidney disease [facility])  Assessment & Plan  Lab Results   Component Value Date    EGFR 13 02/06/2021    EGFR 11 02/05/2021    EGFR 11 02/04/2021    CREATININE 4 19 (H) 02/06/2021    CREATININE 4 77 (H) 02/05/2021    CREATININE 4 86 (H) 02/04/2021     · Baseline creatinine 2-2 9 approximately  4 19 on presentation  · Holding norvasc 2/2 soft BP  · Known to Dr Ivy Anders as outpatient  · Peritoneal dialysis per nephrology  · Nephrology following    Anemia  Assessment & Plan  · Multifactorial  · Monitor hemoglobin closely    Hyperlipidemia  Assessment & Plan  · Continue statin    Dissection of thoracic aorta [facility])  Assessment & Plan  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  · 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  Risk associated with surgical options therefore recommended continued observation      Bradycardia  Assessment & Plan  · Beta-blocker on hold  · Monitor closely  · Cardiology following    Benign essential hypertension  Assessment & Plan  Monitor blood pressures  Norvasc and Lopressor on hold  Avoid hypotension          VTE Pharmacologic Prophylaxis:   Pharmacologic: Warfarin (Coumadin)  Mechanical VTE Prophylaxis in Place: Yes    Patient Centered Rounds: I have performed bedside rounds with nursing staff today  Discussions with Specialists or Other Care Team Provider:  Nephrology, Cardiology    Education and Discussions with Family / Patient:  Discussed with the patient, updated daughter Dexter Kumar    Time Spent for Care: 30 minutes  More than 50% of total time spent on counseling and coordination of care as described above  Current Length of Stay: 11 day(s)    Current Patient Status: Inpatient   Certification Statement: The patient will continue to require additional inpatient hospital stay due to as outlined    Discharge Plan:  Awaiting clinical and symptomatic improvement    Code Status: Level 1 - Full Code      Subjective:     Comfortably in bed  Reports feeling okay    Objective:     Vitals:   Temp (24hrs), Av 2 °F (36 8 °C), Min:98 1 °F (36 7 °C), Max:98 3 °F (36 8 °C)    Temp:  [98 1 °F (36 7 °C)-98 3 °F (36 8 °C)] 98 2 °F (36 8 °C)  HR:  [68-71] 71  Resp:  [16-20] 20  BP: (100-116)/(54-66) 110/62  SpO2:  [93 %-98 %] 98 %  Body mass index is 27 28 kg/m²  Input and Output Summary (last 24 hours):        Intake/Output Summary (Last 24 hours) at 2021 1441  Last data filed at 2021 1429  Gross per 24 hour   Intake 1100 ml   Output 2120 ml   Net -1020 ml       Physical Exam:     Physical Exam    Comfortably lying in bed  Neck supple  Lungs diminished breath sounds bilaterally  Heart sounds S1-S2 noted  Abdomen soft  Awake obeys simple commands  Pulses noted  No rash    Additional Data:     Labs:    Results from last 7 days   Lab Units 21  0453   WBC Thousand/uL 5 86   HEMOGLOBIN g/dL 8 2*   HEMATOCRIT % 25 9*   PLATELETS Thousands/uL 81*   NEUTROS PCT % 65   LYMPHS PCT % 13*   MONOS PCT % 18*   EOS PCT % 4     Results from last 7 days Lab Units 02/06/21  0519 02/05/21  0453   SODIUM mmol/L 139 139   POTASSIUM mmol/L 4 1 3 7   CHLORIDE mmol/L 107 103   CO2 mmol/L 24 30   BUN mg/dL 108* 122*   CREATININE mg/dL 4 19* 4 77*   ANION GAP mmol/L 8 6   CALCIUM mg/dL 9 5 8 7   ALBUMIN g/dL  --  2 7*   TOTAL BILIRUBIN mg/dL  --  0 79   ALK PHOS U/L  --  81   ALT U/L  --  15   AST U/L  --  26   GLUCOSE RANDOM mg/dL 96 108     Results from last 7 days   Lab Units 02/06/21  0519   INR  1 84*                       * I Have Reviewed All Lab Data Listed Above  * Additional Pertinent Lab Tests Reviewed: All Labs Within Last 24 Hours Reviewed    Imaging:    Imaging Reports Reviewed Today Include:   Imaging Personally Reviewed by Myself Includes:      Recent Cultures (last 7 days):           Last 24 Hours Medication List:   Current Facility-Administered Medications   Medication Dose Route Frequency Provider Last Rate    acetaminophen  650 mg Oral Q6H PRN EL Siu      atorvastatin  40 mg Oral Daily EL Siu      benzonatate  100 mg Oral TID PRN Brittany Sears PA-C      bumetanide  4 mg Oral BID Aida Askew, DO      guaiFENesin  600 mg Oral Q12H [facility] Panchito Santos PA-C      melatonin  6 mg Oral HS EL Roth      ondansetron  4 mg Intravenous Q6H PRN EL Siu      oxyCODONE  10 mg Oral Q4H PRN Cecilia Galicia MD      oxyCODONE  5 mg Oral Q4H PRN Cecilia Galicia MD      polyethylene glycol  17 g Oral Daily PRN Aida Askew, DO      sevelamer  800 mg Oral TID With Meals Steve Torres, DO      sodium chloride (PF)  3 mL Intravenous Q1H PRN EL Roth      traZODone  25 mg Oral HS EL Roth      warfarin  2 mg Oral Daily (warfarin) Kodak Espinal PA-C          Today, Patient Was Seen By: Francisco Javier Kim MD    ** Please Note: Dictation voice to text software may have been used in the creation of this document   **

## 2021-02-06 NOTE — PROGRESS NOTES
PD Note:   Pt ordered for 3000ml total volume over 6 hours and 1000ml per exchange, pt tolerated first dwell with out complaints  Pt Drained 124ml prior to cycler ringing low drain alarm  Alarm was reset 10 times total drained was 588ml and Dr Leanna Rosen notified  As per nephrology keep draining for now and do not continue with next exchanges, nephrology will be in to see in the morning  Pt was able to drain a total of 700ml on cycler and 100ml with a manual exchage     Fluid yellow/ clear no fibrin noted    Cycler :   I drain 11ml  Total UF -300  (-200 with manual drain)  avg Dwell 1 44  Lost Dwell 0

## 2021-02-06 NOTE — ASSESSMENT & PLAN NOTE
Lab Results   Component Value Date    EGFR 13 02/06/2021    EGFR 11 02/05/2021    EGFR 11 02/04/2021    CREATININE 4 19 (H) 02/06/2021    CREATININE 4 77 (H) 02/05/2021    CREATININE 4 86 (H) 02/04/2021     · Baseline creatinine 2-2 9 approximately    4 19 on presentation  · Holding norvasc 2/2 soft BP  · Known to Dr Yissel Mo as outpatient  · Peritoneal dialysis per nephrology  · Nephrology following

## 2021-02-06 NOTE — ASSESSMENT & PLAN NOTE
· Rate controlled on Lopressor  · Anticoagulated on Coumadin      · Discussed with nephrology patient is scheduled for peritoneal dialysis catheter change on Tuesday - communicated with Cardiology via [facility] connect awaiting inputs regarding anticoagulation plan

## 2021-02-07 PROCEDURE — 99233 SBSQ HOSP IP/OBS HIGH 50: CPT | Performed by: INTERNAL MEDICINE

## 2021-02-07 PROCEDURE — 99232 SBSQ HOSP IP/OBS MODERATE 35: CPT | Performed by: INTERNAL MEDICINE

## 2021-02-07 RX ADMIN — BUMETANIDE 4 MG: 1 TABLET ORAL at 08:01

## 2021-02-07 RX ADMIN — ATORVASTATIN CALCIUM 40 MG: 40 TABLET, FILM COATED ORAL at 08:01

## 2021-02-07 RX ADMIN — SEVELAMER HYDROCHLORIDE 800 MG: 800 TABLET, FILM COATED PARENTERAL at 17:05

## 2021-02-07 RX ADMIN — SEVELAMER HYDROCHLORIDE 800 MG: 800 TABLET, FILM COATED PARENTERAL at 11:36

## 2021-02-07 RX ADMIN — GUAIFENESIN 600 MG: 600 TABLET, EXTENDED RELEASE ORAL at 08:01

## 2021-02-07 RX ADMIN — BUMETANIDE 4 MG: 1 TABLET ORAL at 17:05

## 2021-02-07 RX ADMIN — GUAIFENESIN 600 MG: 600 TABLET, EXTENDED RELEASE ORAL at 21:16

## 2021-02-07 RX ADMIN — BENZONATATE 100 MG: 100 CAPSULE ORAL at 08:01

## 2021-02-07 RX ADMIN — SEVELAMER HYDROCHLORIDE 800 MG: 800 TABLET, FILM COATED PARENTERAL at 08:01

## 2021-02-07 RX ADMIN — MELATONIN TAB 3 MG 6 MG: 3 TAB at 21:17

## 2021-02-07 RX ADMIN — TRAZODONE HYDROCHLORIDE 25 MG: 50 TABLET ORAL at 21:17

## 2021-02-07 RX ADMIN — BENZONATATE 100 MG: 100 CAPSULE ORAL at 17:05

## 2021-02-07 RX ADMIN — WARFARIN SODIUM 2 MG: 2 TABLET ORAL at 17:05

## 2021-02-07 NOTE — PROGRESS NOTES
Progress Note - Oliver Sharpe 1940, 68 y o  male MRN: [id number]    Unit/Bed#: [facility] 806-01 Encounter: [id number]    Primary Care Provider: EL Tompkins   Date and time admitted to hospital: 1/26/2021  1:24 PM        * Acute on chronic diastolic (congestive) heart failure (HCC)  Assessment & Plan  Wt Readings from Last 3 Encounters:   02/07/21 79 kg (174 lb 2 6 oz)   01/19/21 80 3 kg (177 lb)   01/11/21 78 9 kg (174 lb)     Volume management on peritoneal dialysis  Cardiology following  Monitor daily weights       Atrial fibrillation (HCC)  Assessment & Plan  · Rate controlled on Lopressor  · Anticoagulated on Coumadin  · Discussed with nephrology patient is scheduled for peritoneal dialysis catheter change on Tuesday - monitor INR, will plan IV heparin GTT tomorrow perioperatively    Stage 4 chronic kidney disease [facility])  Assessment & Plan  Lab Results   Component Value Date    EGFR 13 02/06/2021    EGFR 11 02/05/2021    EGFR 11 02/04/2021    CREATININE 4 19 (H) 02/06/2021    CREATININE 4 77 (H) 02/05/2021    CREATININE 4 86 (H) 02/04/2021     · Baseline creatinine 2-2 9 approximately  4 19 on presentation  · Holding norvasc 2/2 soft BP  · Known to Dr Brenden Celeste as outpatient  · Peritoneal dialysis per nephrology  · Nephrology following    Anemia  Assessment & Plan  · Multifactorial  · Monitor hemoglobin closely    Hyperlipidemia  Assessment & Plan  · Continue statin    Dissection of thoracic aorta [facility])  Assessment & Plan  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  · 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  Risk associated with surgical options therefore recommended continued observation      Bradycardia  Assessment & Plan  · Beta-blocker on hold  · Monitor closely  · Cardiology following    Benign essential hypertension  Assessment & Plan  Monitor blood pressures  Norvasc and Lopressor on hold  Avoid hypotension          VTE Pharmacologic Prophylaxis:   Pharmacologic: Warfarin (Coumadin)  Mechanical VTE Prophylaxis in Place: Yes    Patient Centered Rounds: I have performed bedside rounds with nursing staff today  Discussions with Specialists or Other Care Team Provider:  Nephrology    Education and Discussions with Family / Patient:  Discussed with the patient, offered to call family reports he is keeping them updated    Time Spent for Care: 30 minutes  More than 50% of total time spent on counseling and coordination of care as described above  Current Length of Stay: 12 day(s)    Current Patient Status: Inpatient   Certification Statement: The patient will continue to require additional inpatient hospital stay due to As outlined    Discharge Plan:  Awaiting clinical and symptomatic improvement,  PD catheter change with IR on Tuesday    Code Status: Level 1 - Full Code      Subjective:     Comfortably sitting up in bed  Ambulating the room and hallway  Reports feeling okay    Objective:     Vitals:   Temp (24hrs), Av 9 °F (36 6 °C), Min:97 3 °F (36 3 °C), Max:98 3 °F (36 8 °C)    Temp:  [97 3 °F (36 3 °C)-98 3 °F (36 8 °C)] 97 9 °F (36 6 °C)  HR:  [75-80] 76  Resp:  [16-20] 16  BP: (112-122)/(60-66) 112/60  SpO2:  [96 %-98 %] 97 %  Body mass index is 27 28 kg/m²  Input and Output Summary (last 24 hours):        Intake/Output Summary (Last 24 hours) at 2021 1408  Last data filed at 2021 1300  Gross per 24 hour   Intake 880 ml   Output 3150 ml   Net -2270 ml       Physical Exam:     Physical Exam    Comfortably sitting up in bed  Neck supple  Lungs diminished breath sounds bilaterally  Heart sounds S1-S2 noted  Abdomen soft  Awake obeys simple commands  Pulses noted  No rash    Additional Data:     Labs:    Results from last 7 days   Lab Units 21  0453   WBC Thousand/uL 5 86   HEMOGLOBIN g/dL 8 2*   HEMATOCRIT % 25 9*   PLATELETS Thousands/uL 81*   NEUTROS PCT % 65   LYMPHS PCT % 13* MONOS PCT % 18*   EOS PCT % 4     Results from last 7 days   Lab Units 02/06/21  0519 02/05/21  0453   SODIUM mmol/L 139 139   POTASSIUM mmol/L 4 1 3 7   CHLORIDE mmol/L 107 103   CO2 mmol/L 24 30   BUN mg/dL 108* 122*   CREATININE mg/dL 4 19* 4 77*   ANION GAP mmol/L 8 6   CALCIUM mg/dL 9 5 8 7   ALBUMIN g/dL  --  2 7*   TOTAL BILIRUBIN mg/dL  --  0 79   ALK PHOS U/L  --  81   ALT U/L  --  15   AST U/L  --  26   GLUCOSE RANDOM mg/dL 96 108     Results from last 7 days   Lab Units 02/06/21  0519   INR  1 84*                       * I Have Reviewed All Lab Data Listed Above  * Additional Pertinent Lab Tests Reviewed:  All Labs Within Last 24 Hours Reviewed    Imaging:    Imaging Reports Reviewed Today Include:   Imaging Personally Reviewed by Myself Includes:    Recent Cultures (last 7 days):           Last 24 Hours Medication List:   Current Facility-Administered Medications   Medication Dose Route Frequency Provider Last Rate    acetaminophen  650 mg Oral Q6H PRN EL Reece      atorvastatin  40 mg Oral Daily EL Reece      benzonatate  100 mg Oral TID PRN Brittany Sears PA-C      bumetanide  4 mg Oral BID [place],       guaiFENesin  600 mg Oral Q12H [facility] Panchito Santos PA-C      melatonin  6 mg Oral HS EL Roth      ondansetron  4 mg Intravenous Q6H PRN EL Reece      oxyCODONE  10 mg Oral Q4H PRN Flory Solorzano MD      oxyCODONE  5 mg Oral Q4H PRN Flory Solorzano MD      polyethylene glycol  17 g Oral Daily PRN [place] Rito DO      sevelamer  800 mg Oral TID With Meals Danny Torres DO      sodium chloride (PF)  3 mL Intravenous Q1H PRN EL Roth      traZODone  25 mg Oral HS EL Roth      warfarin  2 mg Oral Daily (warfarin) Ree Dockery PA-C          Today, Patient Was Seen By: Mino Talley MD    ** Please Note: Dictation voice to text software may have been used in the creation of this document   **

## 2021-02-07 NOTE — ASSESSMENT & PLAN NOTE
Lab Results   Component Value Date    EGFR 13 02/06/2021    EGFR 11 02/05/2021    EGFR 11 02/04/2021    CREATININE 4 19 (H) 02/06/2021    CREATININE 4 77 (H) 02/05/2021    CREATININE 4 86 (H) 02/04/2021     · Baseline creatinine 2-2 9 approximately    4 19 on presentation  · Holding norvasc 2/2 soft BP  · Known to Dr Fe Dacosta as outpatient  · Peritoneal dialysis per nephrology  · Nephrology following

## 2021-02-07 NOTE — PLAN OF CARE
Problem: Potential for Falls  Goal: Patient will remain free of falls  Description: INTERVENTIONS:  - Assess patient frequently for physical needs  -  Identify cognitive and physical deficits and behaviors that affect risk of falls    -  [place] fall precautions as indicated by assessment   - Educate patient/family on patient safety including physical limitations  - Instruct patient to call for assistance with activity based on assessment  - Modify environment to reduce risk of injury  - Consider OT/PT consult to assist with strengthening/mobility  Outcome: Progressing     Problem: CARDIOVASCULAR - ADULT  Goal: Maintains optimal cardiac output and hemodynamic stability  Description: INTERVENTIONS:  - Monitor I/O, vital signs and rhythm  - Monitor for S/S and trends of decreased cardiac output  - Administer and titrate ordered vasoactive medications to optimize hemodynamic stability  - Assess quality of pulses, skin color and temperature  - Assess for signs of decreased coronary artery perfusion  - Instruct patient to report change in severity of symptoms  Outcome: Progressing     Problem: METABOLIC, FLUID AND ELECTROLYTES - ADULT  Goal: Electrolytes maintained within normal limits  Description: INTERVENTIONS:  - Monitor labs and assess patient for signs and symptoms of electrolyte imbalances  - Administer electrolyte replacement as ordered  - Monitor response to electrolyte replacements, including repeat lab results as appropriate  - Instruct patient on fluid and nutrition as appropriate  Outcome: Progressing  Goal: Fluid balance maintained  Description: INTERVENTIONS:  - Monitor labs   - Monitor I/O and WT  - Instruct patient on fluid and nutrition as appropriate  - Assess for signs & symptoms of volume excess or deficit  Outcome: Progressing     Problem: MUSCULOSKELETAL - ADULT  Goal: Maintain or return mobility to safest level of function  Description: INTERVENTIONS:  - Assess patient's ability to carry out ADLs; assess patient's baseline for ADL function and identify physical deficits which impact ability to perform ADLs (bathing, care of mouth/teeth, toileting, grooming, dressing, etc )  - Assess/evaluate cause of self-care deficits   - Assess range of motion  - Assess patient's mobility  - Assess patient's need for assistive devices and provide as appropriate  - Encourage maximum independence but intervene and supervise when necessary  - Involve family in performance of ADLs  - Assess for home care needs following discharge   - Consider OT consult to assist with ADL evaluation and planning for discharge  - Provide patient education as appropriate  Outcome: Progressing     Problem: Nutrition/Hydration-ADULT  Goal: Nutrient/Hydration intake appropriate for improving, restoring or maintaining nutritional needs  Description: Monitor and assess patient's nutrition/hydration status for malnutrition  Collaborate with interdisciplinary team and initiate plan and interventions as ordered  Monitor patient's weight and dietary intake as ordered or per policy  Utilize nutrition screening tool and intervene as necessary  Determine patient's food preferences and provide high-protein, high-caloric foods as appropriate       INTERVENTIONS:  - Monitor oral intake, urinary output, labs, and treatment plans  - Assess nutrition and hydration status and recommend course of action  - Evaluate amount of meals eaten  - Assist patient with eating if necessary   - Allow adequate time for meals  - Recommend/ encourage appropriate diets, oral nutritional supplements, and vitamin/mineral supplements  - Order, calculate, and assess calorie counts as needed  - Recommend, monitor, and adjust tube feedings and TPN/PPN based on assessed needs  - Assess need for intravenous fluids  - Provide specific nutrition/hydration education as appropriate  - Include patient/family/caregiver in decisions related to nutrition  Outcome: Progressing     Problem: PAIN - ADULT  Goal: Verbalizes/displays adequate comfort level or baseline comfort level  Description: Interventions:  - Encourage patient to monitor pain and request assistance  - Assess pain using appropriate pain scale  - Administer analgesics based on type and severity of pain and evaluate response  - Implement non-pharmacological measures as appropriate and evaluate response  - Consider cultural and social influences on pain and pain management  - Notify physician/advanced practitioner if interventions unsuccessful or patient reports new pain  Outcome: Progressing     Problem: INFECTION - ADULT  Goal: Absence or prevention of progression during hospitalization  Description: INTERVENTIONS:  - Assess and monitor for signs and symptoms of infection  - Monitor lab/diagnostic results  - Monitor all insertion sites, i e  indwelling lines, tubes, and drains  - Monitor endotracheal if appropriate and nasal secretions for changes in amount and color  - [place] appropriate cooling/warming therapies per order  - Administer medications as ordered  - Instruct and encourage patient and family to use good hand hygiene technique  - Identify and instruct in appropriate isolation precautions for identified infection/condition  Outcome: Progressing     Problem: SAFETY ADULT  Goal: Patient will remain free of falls  Description: INTERVENTIONS:  - Assess patient frequently for physical needs  -  Identify cognitive and physical deficits and behaviors that affect risk of falls    -  [place] fall precautions as indicated by assessment   - Educate patient/family on patient safety including physical limitations  - Instruct patient to call for assistance with activity based on assessment  - Modify environment to reduce risk of injury  - Consider OT/PT consult to assist with strengthening/mobility  Outcome: Progressing  Goal: Maintain or return to baseline ADL function  Description: INTERVENTIONS:  -  Assess patient's ability to carry out ADLs; assess patient's baseline for ADL function and identify physical deficits which impact ability to perform ADLs (bathing, care of mouth/teeth, toileting, grooming, dressing, etc )  - Assess/evaluate cause of self-care deficits   - Assess range of motion  - Assess patient's mobility; develop plan if impaired  - Assess patient's need for assistive devices and provide as appropriate  - Encourage maximum independence but intervene and supervise when necessary  - Involve family in performance of ADLs  - Assess for home care needs following discharge   - Consider OT consult to assist with ADL evaluation and planning for discharge  - Provide patient education as appropriate  Outcome: Progressing  Goal: Maintain or return mobility status to optimal level  Description: INTERVENTIONS:  - Assess patient's baseline mobility status (ambulation, transfers, stairs, etc )    - Identify cognitive and physical deficits and behaviors that affect mobility  - Identify mobility aids required to assist with transfers and/or ambulation (gait belt, sit-to-stand, lift, walker, cane, etc )  - [place] fall precautions as indicated by assessment  - Record patient progress and toleration of activity level on Mobility SBAR; progress patient to next Phase/Stage  - Instruct patient to call for assistance with activity based on assessment  - Consider rehabilitation consult to assist with strengthening/weightbearing, etc   Outcome: Progressing     Problem: DISCHARGE PLANNING  Goal: Discharge to home or other facility with appropriate resources  Description: INTERVENTIONS:  - Identify barriers to discharge w/patient and caregiver  - Arrange for needed discharge resources and transportation as appropriate  - Identify discharge learning needs (meds, wound care, etc )  - Arrange for interpretive services to assist at discharge as needed  - Refer to Case Management Department for coordinating discharge planning if the patient needs post-hospital services based on physician/advanced practitioner order or complex needs related to functional status, cognitive ability, or social support system  Outcome: Progressing     Problem: Knowledge Deficit  Goal: Patient/family/caregiver demonstrates understanding of disease process, treatment plan, medications, and discharge instructions  Description: Complete learning assessment and assess knowledge base    Interventions:  - Provide teaching at level of understanding  - Provide teaching via preferred learning methods  Outcome: Progressing

## 2021-02-07 NOTE — PROGRESS NOTES
NEPHROLOGY PROGRESS NOTE   Oliver Sharpe 68 y o  male MRN: [id number]  Unit/Bed#: [facility] 806-01 Encounter: [id number]      ASSESSMENT & PLAN    1  AFSHIN (POA) on stage 4 chronic kidney disease with a baseline creatinine in the mid 2s and an estimated GFR in the low 20 now with progressive CKD  ? He has required hemodialysis in the past  ? Symptomatically worse with a creatinine that has increased  ? Started PD but catheter seems to have turned  ? With uremic symptoms and volume overload: indication to start PD  ? Will continue PO bumex for now has maintenance does  ? Appreciate interventional radiology placement of PD catheter-needs revision on Tuesday  ? CCPD 6 hours 1 5% 1 25 L exchanges every 2 hours with with heparin x 3 exchanges will be planned Tuesday night  ? Bowel regimen  ? Within will need to be on hold and will be placed on a heparin drip  ? Discussed with interventional radiology     2  Electrolytes/Acid Base  ? Hypokalemia-monitor now with PD stable     3  Blood Pressure-hypotension  ? Maps are acceptable  ? Low-dose metoprolol-on hold   ? Cardiology follow-up  ? Diuresing-bumex 4 mg po bid     4  Anemia of CKD  ? Trend hemoglobin  ? Received course of Venofer  ? Thrombocytopenia-platelets pre PD catheter placement     5  CKD-BMD  ? Hyperphosphatemia-sevelemer 800 mg t i d      6  Clinical Course/CV Risk Reduction/Health maintenance/communication  ? Diastolic heart failure with an ejection fraction of 60%  ?  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       SUBJECTIVE:    Patient was seen  Resting comfortably  No chest pain or shortness of breath    OBJECTIVE:  Current Weight: Weight - Scale: 79 kg (174 lb 2 6 oz)  @  Vitals:    02/06/21 2359 02/07/21 0537 02/07/21 0636 02/07/21 0719   BP: 122/66  115/62 112/60   Pulse: 80  75 76   Resp: 16      Temp: 98 3 °F (36 8 °C)  (!) 97 3 °F (36 3 °C) 97 9 °F (36 6 °C)   TempSrc:       SpO2: 98% 98% 97%   Weight:  79 kg (174 lb 2 6 oz)     Height:           Intake/Output Summary (Last 24 hours) at 2/7/2021 1248  Last data filed at 2/7/2021 9404  Gross per 24 hour   Intake 680 ml   Output 2100 ml   Net -1420 ml     Weight (last 2 days)     Date/Time   Weight    02/07/21 0537   79 (174 16)    02/06/21 0600   79 (174 16)    02/05/21 0538   77 2 (170 2)              General: conscious, cooperative, in no acute distress  Eyes: conjunctivae pink, anicteric sclerae  ENT: lips and mucous membranes moist  Neck: supple, no JVD  Chest: no respiratory distress, no accessory muscle use, normal respiratory effort  CVS: normal heart rate, no friction rub  Abdomen: soft, non-tender, non-distended, normoactive bowel sounds  Extremities:  Positive edema  Skin: no rash  Neuro: awake, alert, oriented      Medications:    Current Facility-Administered Medications:     acetaminophen (TYLENOL) tablet 650 mg, 650 mg, Oral, Q6H PRN, EL Roth    atorvastatin (LIPITOR) tablet 40 mg, 40 mg, Oral, Daily, EL Roth, 40 mg at 02/07/21 0801    benzonatate (TESSALON PERLES) capsule 100 mg, 100 mg, Oral, TID PRN, Brittany Sears PA-C, 100 mg at 02/07/21 0801    bumetanide (BUMEX) tablet 4 mg, 4 mg, Oral, BID, Jet Andre DO, 4 mg at 02/07/21 0801    guaiFENesin (MUCINEX) 12 hr tablet 600 mg, 600 mg, Oral, Q12H [facility] TRINY Santos, 600 mg at 02/07/21 0801    melatonin tablet 6 mg, 6 mg, Oral, HS, EL Roth, 6 mg at 02/06/21 2101    ondansetron (ZOFRAN) injection 4 mg, 4 mg, Intravenous, Q6H PRN, EL Roth    oxyCODONE (ROXICODONE) immediate release tablet 10 mg, 10 mg, Oral, Q4H PRN, Mike Leyva MD, 10 mg at 02/04/21 9637    oxyCODONE (ROXICODONE) IR tablet 5 mg, 5 mg, Oral, Q4H PRN, Mike Leyva MD    polyethylene glycol (MIRALAX) packet 17 g, 17 g, Oral, Daily PRN, Kyle Mitchell DO    sevelamer (RENAGEL) tablet 800 mg, 800 mg, Oral, TID With Meals, Samina Medicrayna Irena Body, DO, 800 mg at 02/07/21 1136    Insert peripheral IV, , , Once **AND** sodium chloride (PF) 0 9 % injection 3 mL, 3 mL, Intravenous, Q1H PRN, EL Roth    traZODone (DESYREL) tablet 25 mg, 25 mg, Oral, HS, EL Roth, 25 mg at 02/06/21 2101    warfarin (COUMADIN) tablet 2 mg, 2 mg, Oral, Daily (warfarin), Kendra Babb PA-C, 2 mg at 02/06/21 1715       Lab Results:   Results from last 7 days   Lab Units 02/06/21  0519 02/05/21  0453 02/04/21  0516 02/03/21  0545 02/02/21  0704   WBC Thousand/uL  --  5 86 5 81  --   --    HEMOGLOBIN g/dL  --  8 2* 8 2*  --   --    HEMATOCRIT %  --  25 9* 26 1*  --   --    PLATELETS Thousands/uL  --  81* 88*  --   --    POTASSIUM mmol/L 4 1 3 7 4 3 4 6 4 2   CHLORIDE mmol/L 107 103 103 103 104   CO2 mmol/L 24 30 27 28 28   BUN mg/dL 108* 122* 129* 129* 121*   CREATININE mg/dL 4 19* 4 77* 4 86* 4 53* 4 31*   CALCIUM mg/dL 9 5 8 7 9 1 9 7 9 5   ALK PHOS U/L  --  81  --   --   --    ALT U/L  --  15  --   --   --    AST U/L  --  26  --   --   --          Portions of the record may have been created with voice recognition software  Occasional wrong word or "sound a like" substitutions may have occurred due to the inherent limitations of voice recognition software  Read the chart carefully and recognize, using context, where substitutions have occurred  If you have any questions, please contact the dictating provider

## 2021-02-07 NOTE — PLAN OF CARE
Problem: Potential for Falls  Goal: Patient will remain free of falls  Description: INTERVENTIONS:  - Assess patient frequently for physical needs  -  Identify cognitive and physical deficits and behaviors that affect risk of falls    -  [place] fall precautions as indicated by assessment   - Educate patient/family on patient safety including physical limitations  - Instruct patient to call for assistance with activity based on assessment  - Modify environment to reduce risk of injury  - Consider OT/PT consult to assist with strengthening/mobility  Outcome: Progressing     Problem: CARDIOVASCULAR - ADULT  Goal: Maintains optimal cardiac output and hemodynamic stability  Description: INTERVENTIONS:  - Monitor I/O, vital signs and rhythm  - Monitor for S/S and trends of decreased cardiac output  - Administer and titrate ordered vasoactive medications to optimize hemodynamic stability  - Assess quality of pulses, skin color and temperature  - Assess for signs of decreased coronary artery perfusion  - Instruct patient to report change in severity of symptoms  Outcome: Progressing     Problem: METABOLIC, FLUID AND ELECTROLYTES - ADULT  Goal: Electrolytes maintained within normal limits  Description: INTERVENTIONS:  - Monitor labs and assess patient for signs and symptoms of electrolyte imbalances  - Administer electrolyte replacement as ordered  - Monitor response to electrolyte replacements, including repeat lab results as appropriate  - Instruct patient on fluid and nutrition as appropriate  Outcome: Progressing  Goal: Fluid balance maintained  Description: INTERVENTIONS:  - Monitor labs   - Monitor I/O and WT  - Instruct patient on fluid and nutrition as appropriate  - Assess for signs & symptoms of volume excess or deficit  Outcome: Progressing     Problem: MUSCULOSKELETAL - ADULT  Goal: Maintain or return mobility to safest level of function  Description: INTERVENTIONS:  - Assess patient's ability to carry out ADLs; assess patient's baseline for ADL function and identify physical deficits which impact ability to perform ADLs (bathing, care of mouth/teeth, toileting, grooming, dressing, etc )  - Assess/evaluate cause of self-care deficits   - Assess range of motion  - Assess patient's mobility  - Assess patient's need for assistive devices and provide as appropriate  - Encourage maximum independence but intervene and supervise when necessary  - Involve family in performance of ADLs  - Assess for home care needs following discharge   - Consider OT consult to assist with ADL evaluation and planning for discharge  - Provide patient education as appropriate  Outcome: Progressing     Problem: Nutrition/Hydration-ADULT  Goal: Nutrient/Hydration intake appropriate for improving, restoring or maintaining nutritional needs  Description: Monitor and assess patient's nutrition/hydration status for malnutrition  Collaborate with interdisciplinary team and initiate plan and interventions as ordered  Monitor patient's weight and dietary intake as ordered or per policy  Utilize nutrition screening tool and intervene as necessary  Determine patient's food preferences and provide high-protein, high-caloric foods as appropriate       INTERVENTIONS:  - Monitor oral intake, urinary output, labs, and treatment plans  - Assess nutrition and hydration status and recommend course of action  - Evaluate amount of meals eaten  - Assist patient with eating if necessary   - Allow adequate time for meals  - Recommend/ encourage appropriate diets, oral nutritional supplements, and vitamin/mineral supplements  - Order, calculate, and assess calorie counts as needed  - Recommend, monitor, and adjust tube feedings and TPN/PPN based on assessed needs  - Assess need for intravenous fluids  - Provide specific nutrition/hydration education as appropriate  - Include patient/family/caregiver in decisions related to nutrition  Outcome: Progressing     Problem: PAIN - ADULT  Goal: Verbalizes/displays adequate comfort level or baseline comfort level  Description: Interventions:  - Encourage patient to monitor pain and request assistance  - Assess pain using appropriate pain scale  - Administer analgesics based on type and severity of pain and evaluate response  - Implement non-pharmacological measures as appropriate and evaluate response  - Consider cultural and social influences on pain and pain management  - Notify physician/advanced practitioner if interventions unsuccessful or patient reports new pain  Outcome: Progressing     Problem: INFECTION - ADULT  Goal: Absence or prevention of progression during hospitalization  Description: INTERVENTIONS:  - Assess and monitor for signs and symptoms of infection  - Monitor lab/diagnostic results  - Monitor all insertion sites, i e  indwelling lines, tubes, and drains  - Monitor endotracheal if appropriate and nasal secretions for changes in amount and color  - [place] appropriate cooling/warming therapies per order  - Administer medications as ordered  - Instruct and encourage patient and family to use good hand hygiene technique  - Identify and instruct in appropriate isolation precautions for identified infection/condition  Outcome: Progressing     Problem: SAFETY ADULT  Goal: Patient will remain free of falls  Description: INTERVENTIONS:  - Assess patient frequently for physical needs  -  Identify cognitive and physical deficits and behaviors that affect risk of falls    -  [place] fall precautions as indicated by assessment   - Educate patient/family on patient safety including physical limitations  - Instruct patient to call for assistance with activity based on assessment  - Modify environment to reduce risk of injury  - Consider OT/PT consult to assist with strengthening/mobility  Outcome: Progressing  Goal: Maintain or return to baseline ADL function  Description: INTERVENTIONS:  -  Assess patient's ability to carry out ADLs; assess patient's baseline for ADL function and identify physical deficits which impact ability to perform ADLs (bathing, care of mouth/teeth, toileting, grooming, dressing, etc )  - Assess/evaluate cause of self-care deficits   - Assess range of motion  - Assess patient's mobility; develop plan if impaired  - Assess patient's need for assistive devices and provide as appropriate  - Encourage maximum independence but intervene and supervise when necessary  - Involve family in performance of ADLs  - Assess for home care needs following discharge   - Consider OT consult to assist with ADL evaluation and planning for discharge  - Provide patient education as appropriate  Outcome: Progressing  Goal: Maintain or return mobility status to optimal level  Description: INTERVENTIONS:  - Assess patient's baseline mobility status (ambulation, transfers, stairs, etc )    - Identify cognitive and physical deficits and behaviors that affect mobility  - Identify mobility aids required to assist with transfers and/or ambulation (gait belt, sit-to-stand, lift, walker, cane, etc )  - [place] fall precautions as indicated by assessment  - Record patient progress and toleration of activity level on Mobility SBAR; progress patient to next Phase/Stage  - Instruct patient to call for assistance with activity based on assessment  - Consider rehabilitation consult to assist with strengthening/weightbearing, etc   Outcome: Progressing     Problem: DISCHARGE PLANNING  Goal: Discharge to home or other facility with appropriate resources  Description: INTERVENTIONS:  - Identify barriers to discharge w/patient and caregiver  - Arrange for needed discharge resources and transportation as appropriate  - Identify discharge learning needs (meds, wound care, etc )  - Arrange for interpretive services to assist at discharge as needed  - Refer to Case Management Department for coordinating discharge planning if the patient needs post-hospital services based on physician/advanced practitioner order or complex needs related to functional status, cognitive ability, or social support system  Outcome: Progressing     Problem: Knowledge Deficit  Goal: Patient/family/caregiver demonstrates understanding of disease process, treatment plan, medications, and discharge instructions  Description: Complete learning assessment and assess knowledge base    Interventions:  - Provide teaching at level of understanding  - Provide teaching via preferred learning methods  Outcome: Progressing

## 2021-02-07 NOTE — ASSESSMENT & PLAN NOTE
· Rate controlled on Lopressor  · Anticoagulated on Coumadin      · Discussed with nephrology patient is scheduled for peritoneal dialysis catheter change on Tuesday - monitor INR, will plan IV heparin GTT tomorrow perioperatively

## 2021-02-07 NOTE — ASSESSMENT & PLAN NOTE
Wt Readings from Last 3 Encounters:   02/07/21 79 kg (174 lb 2 6 oz)   01/19/21 80 3 kg (177 lb)   01/11/21 78 9 kg (174 lb)     Volume management on peritoneal dialysis  Cardiology following  Monitor daily weights

## 2021-02-08 LAB
APTT PPP: 48 SECONDS (ref 23–37)
APTT PPP: 49 SECONDS (ref 23–37)
INR PPP: 1.9 (ref 0.84–1.19)
INR PPP: 1.93 (ref 0.84–1.19)
PROTHROMBIN TIME: 21.7 SECONDS (ref 11.6–14.5)
PROTHROMBIN TIME: 21.9 SECONDS (ref 11.6–14.5)

## 2021-02-08 PROCEDURE — 99231 SBSQ HOSP IP/OBS SF/LOW 25: CPT | Performed by: INTERNAL MEDICINE

## 2021-02-08 PROCEDURE — 99232 SBSQ HOSP IP/OBS MODERATE 35: CPT | Performed by: INTERNAL MEDICINE

## 2021-02-08 PROCEDURE — 85610 PROTHROMBIN TIME: CPT | Performed by: INTERNAL MEDICINE

## 2021-02-08 PROCEDURE — 85730 THROMBOPLASTIN TIME PARTIAL: CPT | Performed by: INTERNAL MEDICINE

## 2021-02-08 RX ORDER — HEPARIN SODIUM 10000 [USP'U]/100ML
3-30 INJECTION, SOLUTION INTRAVENOUS
Status: DISCONTINUED | OUTPATIENT
Start: 2021-02-08 | End: 2021-02-09

## 2021-02-08 RX ADMIN — BUMETANIDE 4 MG: 1 TABLET ORAL at 17:39

## 2021-02-08 RX ADMIN — GUAIFENESIN 600 MG: 600 TABLET, EXTENDED RELEASE ORAL at 21:24

## 2021-02-08 RX ADMIN — SEVELAMER HYDROCHLORIDE 800 MG: 800 TABLET, FILM COATED PARENTERAL at 17:39

## 2021-02-08 RX ADMIN — BUMETANIDE 4 MG: 1 TABLET ORAL at 08:10

## 2021-02-08 RX ADMIN — TRAZODONE HYDROCHLORIDE 25 MG: 50 TABLET ORAL at 21:24

## 2021-02-08 RX ADMIN — ATORVASTATIN CALCIUM 40 MG: 40 TABLET, FILM COATED ORAL at 08:10

## 2021-02-08 RX ADMIN — GUAIFENESIN 600 MG: 600 TABLET, EXTENDED RELEASE ORAL at 08:10

## 2021-02-08 RX ADMIN — MELATONIN TAB 3 MG 6 MG: 3 TAB at 21:26

## 2021-02-08 RX ADMIN — SEVELAMER HYDROCHLORIDE 800 MG: 800 TABLET, FILM COATED PARENTERAL at 08:10

## 2021-02-08 RX ADMIN — HEPARIN SODIUM 18 UNITS/KG/HR: 10000 INJECTION, SOLUTION INTRAVENOUS at 13:58

## 2021-02-08 RX ADMIN — SEVELAMER HYDROCHLORIDE 800 MG: 800 TABLET, FILM COATED PARENTERAL at 12:00

## 2021-02-08 NOTE — UTILIZATION REVIEW
Continued Stay Review    Date: 2/8/21                          Current Patient Class: IP  Current Level of Care: MS    HPI:80 y o  male initially admitted on 1/26/21 with Acute on chronic dCHF, stage 4 CKD, A fib, anemia, HLD, dissection thoracic aorta, bradycardia,     Assessment/Plan:   Pt remains on a heparin drip with subtherapeutic INR   2/3 pt had peritoneal dialysis catheter placed  Catheter has malfunctioned and he is not getting PD currently  Plan is for return to IR for adjustment on 2/9  He will be NPO p MN for procedure tomorrow  Pt is unsure he will have home support to continue PD and is inquiring about HD instead  Continues on oral Bumex 4 mg BID  He is off BB r/t bradycardia        Pertinent Labs/Diagnostic Results:     2/6 Xray abd KUB - Peritoneal catheter looped in the left pelvis        Results from last 7 days   Lab Units 02/05/21 0453 02/04/21  0516   WBC Thousand/uL 5 86 5 81   HEMOGLOBIN g/dL 8 2* 8 2*   HEMATOCRIT % 25 9* 26 1*   PLATELETS Thousands/uL 81* 88*   NEUTROS ABS Thousands/µL 3 78 4 06         Results from last 7 days   Lab Units 02/06/21  0519 02/05/21  0453 02/04/21  0516 02/03/21  0545 02/02/21  0704   SODIUM mmol/L 139 139 139 139 141   POTASSIUM mmol/L 4 1 3 7 4 3 4 6 4 2   CHLORIDE mmol/L 107 103 103 103 104   CO2 mmol/L 24 30 27 28 28   ANION GAP mmol/L 8 6 9 8 9   BUN mg/dL 108* 122* 129* 129* 121*   CREATININE mg/dL 4 19* 4 77* 4 86* 4 53* 4 31*   EGFR ml/min/1 73sq m 13 11 11 12 12   CALCIUM mg/dL 9 5 8 7 9 1 9 7 9 5     Results from last 7 days   Lab Units 02/05/21  0453   AST U/L 26   ALT U/L 15   ALK PHOS U/L 81   TOTAL PROTEIN g/dL 7 4   ALBUMIN g/dL 2 7*   TOTAL BILIRUBIN mg/dL 0 79         Results from last 7 days   Lab Units 02/06/21  0519 02/05/21  0453 02/04/21  0516 02/03/21  0545 02/02/21  0704   GLUCOSE RANDOM mg/dL 96 108 107 74 81     Results from last 7 days   Lab Units 02/08/21  1400 02/08/21  0505 02/06/21  0519   PROTIME seconds 21 7* 21 9* 21 2* INR  1 90* 1 93* 1 84*   PTT seconds 48*  --   --      Vital Signs:   02/08/21 15:50:06  --  78  18  117/74  88  98 %  --   02/08/21 07:14:58  97 8 °F (36 6 °C)  81  16  112/72  85  94 %  None (Room air)   02/07/21 22:26:49  98 5 °F (36 9 °C)  77  16  91/60  70  95 %  --   02/07/21 2000  --  --  --  --  --  96 %  --   02/07/21 15:39:36  98 2 °F (36 8 °C)  80  14  109/66  80  96 %  --   02/07/21 07:19:50  97 9 °F (36 6 °C)  76  --  112/60  77  97 %  --   02/07/21 06:36:22  97 3 °F (36 3 °C)Abnormal   75  --  115/62  80  98 %  --   02/06/21 23:59:06  98 3 °F (36 8 °C)  80  16  122/66  85  98 %  --   02/06/21 15:44:24  98 1 °F (36 7 °C)  77  20  120/64  83  96 %  --   02/06/21 07:45:30  98 2 °F (36 8 °C)  71  20  110/62  78  98 %  --   02/06/21 04:33:07  --  --  --  110/66  81  --  --   02/06/21 01:52:41  98 2 °F (36 8 °C)  71  --  100/54  69  93 %  --   02/06/21 01:52:35  98 2 °F (36 8 °C)  71  --  --  --  93 %  --   02/06/21 00:52:56  98 3 °F (36 8 °C)  68  --  --  --  97 %  --        Medications:   Scheduled Medications:  atorvastatin, 40 mg, Oral, Daily  bumetanide, 4 mg, Oral, BID  guaiFENesin, 600 mg, Oral, Q12H [address]  melatonin, 6 mg, Oral, HS  sevelamer, 800 mg, Oral, TID With Meals  traZODone, 25 mg, Oral, HS      Continuous IV Infusions:  heparin (porcine), 3-30 Units/kg/hr (Order-Specific), Intravenous, Titrated      PRN Meds:  acetaminophen, 650 mg, Oral, Q6H PRN  benzonatate, 100 mg, Oral, TID PRN - x 2 2/6, 2/7  ondansetron, 4 mg, Intravenous, Q6H PRN  oxyCODONE, 10 mg, Oral, Q4H PRN  oxyCODONE, 5 mg, Oral, Q4H PRN  polyethylene glycol, 17 g, Oral, Daily PRN    Discharge Plan: D    Network Utilization Review Department  ATTENTION: Please call with any questions or concerns to [phone] and carefully listen to the prompts so that you are directed to the right person   All voicemails are confidential   Chuck Morris all requests for admission clinical reviews, approved or denied determinations and any other requests to dedicated fax number below belonging to the campus where the patient is receiving treatment   List of dedicated fax numbers for the Facilities:  [address] [place] [address] DENIALS (Administrative/Medical Necessity) [phone]   [address] (Maternity/NICU/Pediatrics) [phone] [address] Nimesh Lau 9987 (  Enzo Mcnulty "Manuela" 103) [address] [address] Sylvia Marie 1481 [address] [place]) [address] [place] [address] [phone]

## 2021-02-08 NOTE — ASSESSMENT & PLAN NOTE
Also with 2 second pause noted earlier in hospital stay  · Lopressor held  · Asymptomatic    · Appreciate cardiology recommendations, possible Zio patch as outpatient

## 2021-02-08 NOTE — ASSESSMENT & PLAN NOTE
Presented with 15 lb weight gain in one month, GONZALES, worsening lower extremity edema  Outpatient cardiology team ordered extra dose of diuretic however no improvement  Patient was noted to have AFSHIN on outpatient labs and referred to ED  · Outpatient diuretic regimen bumex 4 mg PO BID with k 20 BID and metolazone 5 mg  2 x weekly  · Heart failure following, was on Bumex drip for several days and now transitioned to PO Bumex  · Last echo 10/2020 showed preserved EF  Mild MR   Severe TR    · I/O, daily weights, low NA diet with FR  · Monitor volume status--improving with PD

## 2021-02-08 NOTE — PLAN OF CARE
Problem: Potential for Falls  Goal: Patient will remain free of falls  Description: INTERVENTIONS:  - Assess patient frequently for physical needs  -  Identify cognitive and physical deficits and behaviors that affect risk of falls    -  [place] fall precautions as indicated by assessment   - Educate patient/family on patient safety including physical limitations  - Instruct patient to call for assistance with activity based on assessment  - Modify environment to reduce risk of injury  - Consider OT/PT consult to assist with strengthening/mobility  Outcome: Progressing     Problem: CARDIOVASCULAR - ADULT  Goal: Maintains optimal cardiac output and hemodynamic stability  Description: INTERVENTIONS:  - Monitor I/O, vital signs and rhythm  - Monitor for S/S and trends of decreased cardiac output  - Administer and titrate ordered vasoactive medications to optimize hemodynamic stability  - Assess quality of pulses, skin color and temperature  - Assess for signs of decreased coronary artery perfusion  - Instruct patient to report change in severity of symptoms  Outcome: Progressing     Problem: METABOLIC, FLUID AND ELECTROLYTES - ADULT  Goal: Electrolytes maintained within normal limits  Description: INTERVENTIONS:  - Monitor labs and assess patient for signs and symptoms of electrolyte imbalances  - Administer electrolyte replacement as ordered  - Monitor response to electrolyte replacements, including repeat lab results as appropriate  - Instruct patient on fluid and nutrition as appropriate  Outcome: Progressing  Goal: Fluid balance maintained  Description: INTERVENTIONS:  - Monitor labs   - Monitor I/O and WT  - Instruct patient on fluid and nutrition as appropriate  - Assess for signs & symptoms of volume excess or deficit  Outcome: Progressing     Problem: MUSCULOSKELETAL - ADULT  Goal: Maintain or return mobility to safest level of function  Description: INTERVENTIONS:  - Assess patient's ability to carry out ADLs; assess patient's baseline for ADL function and identify physical deficits which impact ability to perform ADLs (bathing, care of mouth/teeth, toileting, grooming, dressing, etc )  - Assess/evaluate cause of self-care deficits   - Assess range of motion  - Assess patient's mobility  - Assess patient's need for assistive devices and provide as appropriate  - Encourage maximum independence but intervene and supervise when necessary  - Involve family in performance of ADLs  - Assess for home care needs following discharge   - Consider OT consult to assist with ADL evaluation and planning for discharge  - Provide patient education as appropriate  Outcome: Progressing     Problem: Nutrition/Hydration-ADULT  Goal: Nutrient/Hydration intake appropriate for improving, restoring or maintaining nutritional needs  Description: Monitor and assess patient's nutrition/hydration status for malnutrition  Collaborate with interdisciplinary team and initiate plan and interventions as ordered  Monitor patient's weight and dietary intake as ordered or per policy  Utilize nutrition screening tool and intervene as necessary  Determine patient's food preferences and provide high-protein, high-caloric foods as appropriate       INTERVENTIONS:  - Monitor oral intake, urinary output, labs, and treatment plans  - Assess nutrition and hydration status and recommend course of action  - Evaluate amount of meals eaten  - Assist patient with eating if necessary   - Allow adequate time for meals  - Recommend/ encourage appropriate diets, oral nutritional supplements, and vitamin/mineral supplements  - Order, calculate, and assess calorie counts as needed  - Recommend, monitor, and adjust tube feedings and TPN/PPN based on assessed needs  - Assess need for intravenous fluids  - Provide specific nutrition/hydration education as appropriate  - Include patient/family/caregiver in decisions related to nutrition  Outcome: Progressing     Problem: PAIN - ADULT  Goal: Verbalizes/displays adequate comfort level or baseline comfort level  Description: Interventions:  - Encourage patient to monitor pain and request assistance  - Assess pain using appropriate pain scale  - Administer analgesics based on type and severity of pain and evaluate response  - Implement non-pharmacological measures as appropriate and evaluate response  - Consider cultural and social influences on pain and pain management  - Notify physician/advanced practitioner if interventions unsuccessful or patient reports new pain  Outcome: Progressing     Problem: INFECTION - ADULT  Goal: Absence or prevention of progression during hospitalization  Description: INTERVENTIONS:  - Assess and monitor for signs and symptoms of infection  - Monitor lab/diagnostic results  - Monitor all insertion sites, i e  indwelling lines, tubes, and drains  - Monitor endotracheal if appropriate and nasal secretions for changes in amount and color  - [place] appropriate cooling/warming therapies per order  - Administer medications as ordered  - Instruct and encourage patient and family to use good hand hygiene technique  - Identify and instruct in appropriate isolation precautions for identified infection/condition  Outcome: Progressing     Problem: SAFETY ADULT  Goal: Patient will remain free of falls  Description: INTERVENTIONS:  - Assess patient frequently for physical needs  -  Identify cognitive and physical deficits and behaviors that affect risk of falls    -  [place] fall precautions as indicated by assessment   - Educate patient/family on patient safety including physical limitations  - Instruct patient to call for assistance with activity based on assessment  - Modify environment to reduce risk of injury  - Consider OT/PT consult to assist with strengthening/mobility  Outcome: Progressing  Goal: Maintain or return to baseline ADL function  Description: INTERVENTIONS:  -  Assess patient's ability to carry out ADLs; assess patient's baseline for ADL function and identify physical deficits which impact ability to perform ADLs (bathing, care of mouth/teeth, toileting, grooming, dressing, etc )  - Assess/evaluate cause of self-care deficits   - Assess range of motion  - Assess patient's mobility; develop plan if impaired  - Assess patient's need for assistive devices and provide as appropriate  - Encourage maximum independence but intervene and supervise when necessary  - Involve family in performance of ADLs  - Assess for home care needs following discharge   - Consider OT consult to assist with ADL evaluation and planning for discharge  - Provide patient education as appropriate  Outcome: Progressing  Goal: Maintain or return mobility status to optimal level  Description: INTERVENTIONS:  - Assess patient's baseline mobility status (ambulation, transfers, stairs, etc )    - Identify cognitive and physical deficits and behaviors that affect mobility  - Identify mobility aids required to assist with transfers and/or ambulation (gait belt, sit-to-stand, lift, walker, cane, etc )  - [place] fall precautions as indicated by assessment  - Record patient progress and toleration of activity level on Mobility SBAR; progress patient to next Phase/Stage  - Instruct patient to call for assistance with activity based on assessment  - Consider rehabilitation consult to assist with strengthening/weightbearing, etc   Outcome: Progressing     Problem: DISCHARGE PLANNING  Goal: Discharge to home or other facility with appropriate resources  Description: INTERVENTIONS:  - Identify barriers to discharge w/patient and caregiver  - Arrange for needed discharge resources and transportation as appropriate  - Identify discharge learning needs (meds, wound care, etc )  - Arrange for interpretive services to assist at discharge as needed  - Refer to Case Management Department for coordinating discharge planning if the patient needs post-hospital services based on physician/advanced practitioner order or complex needs related to functional status, cognitive ability, or social support system  Outcome: Progressing     Problem: Knowledge Deficit  Goal: Patient/family/caregiver demonstrates understanding of disease process, treatment plan, medications, and discharge instructions  Description: Complete learning assessment and assess knowledge base    Interventions:  - Provide teaching at level of understanding  - Provide teaching via preferred learning methods  Outcome: Progressing

## 2021-02-08 NOTE — QUICK NOTE
Was contacted by Dr German Rice  PD catheter not draining per notes  Spoke with Dr Lida Soto  Patient will have assistance by family member for PD       - will plan for PD catheter removal and replacement tomorrow pending anesthesia scheduling  - please keep npo after midnight  - will have coumadin held today  - f/u INR in AM    Addison Yao PA-C

## 2021-02-08 NOTE — PROGRESS NOTES
Heart Failure/ Pulmonary Hypertension Progress Note - Oliver Sharpe 68 y o  male MRN: [id number]    [facility] Encounter: [id number]      Assessment:    Principal Problem:    Acute on chronic diastolic (congestive) heart failure (HCC)  Active Problems:    Benign essential hypertension    Hypertension    Bradycardia    Dissection of thoracic aorta (HCC)    Hyperlipidemia    Anemia    Stage 4 chronic kidney disease (HCC)    Atrial fibrillation (HCC)    Insomnia    MI (myocardial infarction) (HCC)    ESRD (end stage renal disease) (HCC)      Subjective:   Lois Jon is a 68year-old man with PMH as below who presented to [facility] 01/26/2021 after being contacted by cardiology office to review abnormal renal function  Patient had increased frequency of metolazone dosing and had minimal improvement with no reported weight loss or symptomatic improvement  Case was discussed with Dr Silvia Couch and decision was made to have patient proceed to ED for further evaluation  Patient seen and examined  Bradycardia improved with holding BB  Had started on  PD however catheter now malfunctioning  Scheduled to have adjustment tomorrow in IR  Plan to resume PD tomorrow PM        Objective: Intake/ Output: 640/3400/-2 8L  Weight: bed scale ? ?         Acute on chronic HFpEF - Volume management: Bumex 4mg IV BID  S/P PD catheter placement, scheduled for adjustment due to malfunction tomorrow   BP controlled at this point off antihypertensives  Continue to monitor  May need to restart Amlodipine as outpatient    Currently enrolled in [facility]als program                      TTE 04/24/2019: LVEF 60%  LVIDd 4 31 cm  IVSd 1 4 cm  Grade 1 DD  Normal RV size and RVSF  Mild TR  PASP 35 mmHg                TTE 10/06/2020: LVEF 60%  LVIDd 4 69 cm  IVSd 1 41 cm  Grade 2 DD  Normal RV size and RVSF  KUNAL  Mild MR  Severe TR                Paroxysmal atrial [id number] = 4 (age, CHF, HTN)  Anticoagulation on Coumadin  Persistently bradycardic this admit into the mid 30's  Trial off Metop for now and monitor  Bradycardia  With rates in the 30-40s seen on telemetry this admit  Improved  Consider further monitoring as outpatient, ie Zio patch or HM to further evaluate   BB d/c for now  Continue to monitor response as outpatient        Hypertension  Well controlled  Amlodipine 5 mg daily currently on hold for soft BP        Hyperlipidemia  FLP 1/26/21: TC 88, TD 69, HDL 42, LDL 32  Continue on atorvastatin 40 mg daily       Chronic kidney disease, stage IV Baseline creatinine was 2 0-2 3, new baseline likely 3 5-4  S/P PD cath placement on 2/3/21     Follows with Dr Cari Burkett as outpatient  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 with hemiarch reconstruction and aortic valve resuspension with a 26 mm Dacron graft on 12/15/2019 by Dr Linden Erickson               Benign prostatic hyperplasia  History of COVID-19 infection: diagnosed in 07/2020  Review of Systems   All other systems reviewed and are negative  [facility] (day, reason): [place] catheter (day, reason):    Vitals: Blood pressure 112/72, pulse 81, temperature 97 8 °F (36 6 °C), resp  rate 16, height 5' 7" (1 702 m), weight 79 kg (174 lb 2 6 oz), SpO2 94 %  , Body mass index is 27 28 kg/m² , I/O last 3 completed shifts: In: 640 [P O :640]  Out: 4700 [Urine:4700]  I/O this shift:  In: 180 [P O :180]  Out: 550 [Urine:550]  Wt Readings from Last 3 Encounters:   02/08/21 79 kg (174 lb 2 6 oz)   01/19/21 80 3 kg (177 lb)   01/11/21 78 9 kg (174 lb)       Intake/Output Summary (Last 24 hours) at 2/8/2021 1028  Last data filed at 2/8/2021 1017  Gross per 24 hour   Intake 820 ml   Output 3700 ml   Net -2880 ml     I/O last 3 completed shifts:   In: 640 [P O :640]  Out: 4700 [Urine:4700]          Physical Exam:  Vitals:    02/07/21 2000 02/07/21 2226 02/08/21 0600 02/08/21 0714   BP:  91/60  112/72   Pulse:  77  81   Resp:  16 16   Temp:  98 5 °F (36 9 °C)  97 8 °F (36 6 °C)   TempSrc:       SpO2: 96% 95%  94%   Weight:   79 kg (174 lb 2 6 oz)    Height:           GEN: Oliver Sharpe appears well, alert and oriented x 3, pleasant and cooperative   HEENT: pupils equal, round, and reactive to light; extraocular muscles intact  NECK: supple, no carotid bruits   HEART: regular rhythm, normal S1 and S2, no murmurs, clicks, gallops or rubs, JVP is elevated     LUNGS: coarse to auscultation bilaterally; +expiratory wheezes, few scattered rales, no rhonchi   ABDOMEN: normal bowel sounds, soft, no tenderness, no distention  EXTREMITIES: peripheral pulses normal; no clubbing, cyanosis, or edema  NEURO: no focal findings   SKIN: normal without suspicious lesions on exposed skin      Current Facility-Administered Medications:     acetaminophen (TYLENOL) tablet 650 mg, 650 mg, Oral, Q6H PRN, EL Roth    atorvastatin (LIPITOR) tablet 40 mg, 40 mg, Oral, Daily, EL Roth, 40 mg at 02/08/21 0810    benzonatate (TESSALON PERLES) capsule 100 mg, 100 mg, Oral, TID PRN, Brittany Sears PA-C, 100 mg at 02/07/21 1705    bumetanide (BUMEX) tablet 4 mg, 4 mg, Oral, BID, Jet Andre, DO, 4 mg at 02/08/21 0810    guaiFENesin (MUCINEX) 12 hr tablet 600 mg, 600 mg, Oral, Q12H [facility] TRINY Santos, 600 mg at 02/08/21 0810    melatonin tablet 6 mg, 6 mg, Oral, HS, EL Roth, 6 mg at 02/07/21 2117    ondansetron (ZOFRAN) injection 4 mg, 4 mg, Intravenous, Q6H PRN, EL Roth    oxyCODONE (ROXICODONE) immediate release tablet 10 mg, 10 mg, Oral, Q4H PRN, Maximus Blanton MD, 10 mg at 02/04/21 2325    oxyCODONE (ROXICODONE) IR tablet 5 mg, 5 mg, Oral, Q4H PRN, Maximus Blanton MD    polyethylene glycol (MIRALAX) packet 17 g, 17 g, Oral, Daily PRN, Claudene Spates, DO    sevelamer (RENAGEL) tablet 800 mg, 800 mg, Oral, TID With Meals, Pamela Torres DO, 800 mg at 02/08/21 0810    Insert peripheral IV, , , Once **AND** sodium chloride (PF) 0 9 % injection 3 mL, 3 mL, Intravenous, Q1H PRN, EL Roth    traZODone (DESYREL) tablet 25 mg, 25 mg, Oral, HS, EL Roth, 25 mg at 02/07/21 2117    warfarin (COUMADIN) tablet 2 mg, 2 mg, Oral, Daily (warfarin), Soto Monteiro PA-C, 2 mg at 02/07/21 1705      Labs & Results:        Results from last 7 days   Lab Units 02/05/21  0453 02/04/21  0516   WBC Thousand/uL 5 86 5 81   HEMOGLOBIN g/dL 8 2* 8 2*   HEMATOCRIT % 25 9* 26 1*   PLATELETS Thousands/uL 81* 88*         Results from last 7 days   Lab Units 02/06/21  0519 02/05/21  0453 02/04/21  0516   POTASSIUM mmol/L 4 1 3 7 4 3   CHLORIDE mmol/L 107 103 103   CO2 mmol/L 24 30 27   BUN mg/dL 108* 122* 129*   CREATININE mg/dL 4 19* 4 77* 4 86*   CALCIUM mg/dL 9 5 8 7 9 1   ALK PHOS U/L  --  81  --    ALT U/L  --  15  --    AST U/L  --  26  --      Results from last 7 days   Lab Units 02/08/21  0505 02/06/21  0519 02/05/21  0453   INR  1 93* 1 84* 2 03*         Counseling / Coordination of Care  Total floor / unit time spent today 20 minutes  Greater than 50% of total time was spent with the patient and / or family counseling and / or coordination of care  A description of the counseling / coordination of care: 20  Jenelle Bowens Tim

## 2021-02-08 NOTE — PROGRESS NOTES
NEPHROLOGY PROGRESS NOTE    Oliver Sharpe 68 y o  male MRN: [id number]  [facility] Encounter: [id number]  Reason for Consult:  End-stage renal disease    Patient was sleeping when I went into the room I awakened him and he was alert and oriented  Says he has no pain  He informed me he has not been doing the peritoneal dialysis cause was not draining due to catheter malfunction  Scheduled to go to Interventional Radiology for adjustment of catheter tomorrow  No complaints for me specifically  This was the 1st that I met the patient but he started to tell me he is not sure he will have someone to be able to help him do this procedure  Apparently the patient had discussed with his primary nephrologist doing peritoneal dialysis as an outpatient he was admitted and is undergoing urgent start PD  This will require training and if he feels he does not have the support we may need to consider hemodialysis  This is the 1st he told me so we will reassess as he is unsure if he can find someone to help him but he is trying  ASSESSMENT/PLAN:  1  Renal    The patient had progressive chronic kidney disease was initiated on renal replacement therapy with peritoneal dialysis  As above the patient was planning on doing peritoneal dialysis and having a catheter placed as an outpatient but came in with symptoms and has been started with PD as an urgent start  This will require training upon discharge and talking today as above he says that he may not have somebody to help him  If this is the case we will need to initiate hemodialysis  In the meantime he set up to have his catheter adjusted and planning to resume PD Tuesday evening  Adjust PD catheter in interventional radiology  Determine if patient is going to be able to do peritoneal dialysis versus hemodialysis from the statements above made to me today      As an addendum to this note written earlier I was contacted by case management after I discuss the case with them and apparently there is a family member who will be willing to learn peritoneal dialysis  I spoke interventional Radiology and they will be adjusting the catheter tomorrow  SUBJECTIVE:  Review of Systems   Constitution: Negative for chills, diaphoresis, fever and night sweats  HENT: Negative  Eyes: Negative  Cardiovascular: Negative for chest pain, dyspnea on exertion, leg swelling and orthopnea  Respiratory: Negative  Negative for cough, shortness of breath, sputum production and wheezing  Gastrointestinal: Negative for abdominal pain, diarrhea, nausea and vomiting  Neurological: Negative for dizziness, focal weakness, headaches and light-headedness  Psychiatric/Behavioral: Negative for altered mental status, depression, hallucinations and hypervigilance  OBJECTIVE:  Current Weight: Weight - Scale: 79 kg (174 lb 2 6 oz)  Vitals:Temp (24hrs), Av 2 °F (36 8 °C), Min:97 8 °F (36 6 °C), Max:98 5 °F (36 9 °C)  Current: Temperature: 97 8 °F (36 6 °C)   Blood pressure 112/72, pulse 81, temperature 97 8 °F (36 6 °C), resp  rate 16, height 5' 7" (1 702 m), weight 79 kg (174 lb 2 6 oz), SpO2 94 %  , Body mass index is 27 28 kg/m²  Intake/Output Summary (Last 24 hours) at 2021 0958  Last data filed at 2021 0814  Gross per 24 hour   Intake 640 ml   Output 3700 ml   Net -3060 ml       Physical Exam: /72   Pulse 81   Temp 97 8 °F (36 6 °C)   Resp 16   Ht 5' 7" (1 702 m)   Wt 79 kg (174 lb 2 6 oz)   SpO2 94%   BMI 27 28 kg/m²   Physical Exam  Constitutional:       General: He is not in acute distress  Appearance: He is not ill-appearing or diaphoretic  HENT:      Head: Normocephalic and atraumatic  Neck:      Musculoskeletal: Neck supple  Vascular: No JVD  Cardiovascular:      Rate and Rhythm: Normal rate and regular rhythm  Heart sounds: No friction rub  No gallop         Comments: No significant edema  Pulmonary:      Effort: Pulmonary effort is normal       Breath sounds: Normal breath sounds  No decreased breath sounds, wheezing, rhonchi or rales  Abdominal:      General: Bowel sounds are normal       Palpations: Abdomen is soft  Tenderness: There is no abdominal tenderness  There is no rebound  Neurological:      General: No focal deficit present  Mental Status: He is alert and oriented to person, place, and time  Psychiatric:         Mood and Affect: Mood normal  Mood is not anxious  Behavior: Behavior normal  Behavior is not agitated           Medications:    Current Facility-Administered Medications:     acetaminophen (TYLENOL) tablet 650 mg, 650 mg, Oral, Q6H PRN, EL Roth    atorvastatin (LIPITOR) tablet 40 mg, 40 mg, Oral, Daily, EL Roth, 40 mg at 02/08/21 0810    benzonatate (TESSALON PERLES) capsule 100 mg, 100 mg, Oral, TID PRN, Brittany Seasr PA-C, 100 mg at 02/07/21 1705    bumetanide (BUMEX) tablet 4 mg, 4 mg, Oral, BID, Jet Andre DO, 4 mg at 02/08/21 0810    guaiFENesin (MUCINEX) 12 hr tablet 600 mg, 600 mg, Oral, Q12H [address] [place] TRINY Santos, 600 mg at 02/08/21 0810    melatonin tablet 6 mg, 6 mg, Oral, HS, EL Roth, 6 mg at 02/07/21 2117    ondansetron (ZOFRAN) injection 4 mg, 4 mg, Intravenous, Q6H PRN, EL Roth    oxyCODONE (ROXICODONE) immediate release tablet 10 mg, 10 mg, Oral, Q4H PRN, Ketan Sawyer MD, 10 mg at 02/04/21 2325    oxyCODONE (ROXICODONE) IR tablet 5 mg, 5 mg, Oral, Q4H PRN, Ketan Sawyer MD    polyethylene glycol (MIRALAX) packet 17 g, 17 g, Oral, Daily PRN, Lonzell Closs, DO    sevelamer (RENAGEL) tablet 800 mg, 800 mg, Oral, TID With Meals, Leonila Torres DO, 800 mg at 02/08/21 0810    Insert peripheral IV, , , Once **AND** sodium chloride (PF) 0 9 % injection 3 mL, 3 mL, Intravenous, Q1H PRN, EL Roth    traZODone (DESYREL) tablet 25 mg, 25 mg, Oral, HS, EL Roth, 25 mg at 02/07/21 2117    warfarin (COUMADIN) tablet 2 mg, 2 mg, Oral, Daily (warfarin), Americo Santos PA-C, 2 mg at 02/07/21 1705    Laboratory Results:  Lab Results   Component Value Date    WBC 5 86 02/05/2021    HGB 8 2 (L) 02/05/2021    HCT 25 9 (L) 02/05/2021    MCV 97 02/05/2021    PLT 81 (L) 02/05/2021     Lab Results   Component Value Date    SODIUM 139 02/06/2021    K 4 1 02/06/2021     02/06/2021    CO2 24 02/06/2021     (H) 02/06/2021    CREATININE 4 19 (H) 02/06/2021    GLUC 96 02/06/2021    CALCIUM 9 5 02/06/2021     Lab Results   Component Value Date    CALCIUM 9 5 02/06/2021    PHOS 5 4 (H) 01/30/2021     No results found for: LABPROT

## 2021-02-08 NOTE — ASSESSMENT & PLAN NOTE
Lab Results   Component Value Date    EGFR 13 02/06/2021    EGFR 11 02/05/2021    EGFR 11 02/04/2021    CREATININE 4 19 (H) 02/06/2021    CREATININE 4 77 (H) 02/05/2021    CREATININE 4 86 (H) 02/04/2021     · Baseline creatinine 2-2 9 approximately  4 19 on presentation  · Holding norvasc 2/2 soft BP  · Known to Dr Vaughn Bender as outpatient  · Peritoneal dialysis per nephrology however catheter needs to be adjusted, IR following   Considering tomorrow

## 2021-02-08 NOTE — PROGRESS NOTES
Progress Note - Oliver Sharpe 1940, 68 y o  male MRN: [id number]    Unit/Bed#: [facility] 806-01 Encounter: [id number]    Primary Care Provider: EL Rocha   Date and time admitted to hospital: 1/26/2021  1:24 PM      * Acute on chronic diastolic (congestive) heart failure ([facility] [address] )  Assessment & Plan  Presented with 15 lb weight gain in one month, GONZALES, worsening lower extremity edema  Outpatient cardiology team ordered extra dose of diuretic however no improvement  Patient was noted to have AFSHIN on outpatient labs and referred to ED  · Outpatient diuretic regimen bumex 4 mg PO BID with k 20 BID and metolazone 5 mg  2 x weekly  · Heart failure following, was on Bumex drip for several days and now transitioned to PO Bumex  · Last echo 10/2020 showed preserved EF  Mild MR  Severe TR    · I/O, daily weights, low NA diet with FR  · Monitor volume status--improving with PD            Stage 4 chronic kidney disease [facility])  Assessment & Plan  Lab Results   Component Value Date    EGFR 13 02/06/2021    EGFR 11 02/05/2021    EGFR 11 02/04/2021    CREATININE 4 19 (H) 02/06/2021    CREATININE 4 77 (H) 02/05/2021    CREATININE 4 86 (H) 02/04/2021     · Baseline creatinine 2-2 9 approximately  4 19 on presentation  · Holding norvasc 2/2 soft BP  · Known to Dr Evert Ahumada as outpatient  · Peritoneal dialysis per nephrology however catheter needs to be adjusted, IR following  Considering tomorrow     Insomnia  Assessment & Plan  Monitor symptoms   · Continue melatonin  · Continue Trazodone HS    Atrial fibrillation (HCC)  Assessment & Plan  Rate controlled on Lopressor  · Anticoagulated on Coumadin typically  · Discussed with IR today, patient is tentatively scheduled for peritoneal dialysis catheter change on Tuesday     · Hold warfarin tonight (last dose 2/7 PM) and bridge with heparin gtt  · Monitor INR     Anemia  Assessment & Plan  Multifactorial in setting of CKD  · Monitor hemoglobin closely    Hyperlipidemia  Assessment & Plan  · Continue statin    Dissection of thoracic aorta [facility])  Assessment & Plan  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  · 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  Risk associated with surgical options therefore recommended continued observation  Bradycardia  Assessment & Plan  Also with 2 second pause noted earlier in hospital stay  · Lopressor held  · Asymptomatic  · Appreciate cardiology recommendations, possible Zio patch as outpatient     Benign essential hypertension  Assessment & Plan  Monitor blood pressures, have actually been low  · Norvasc and Lopressor on hold        VTE Pharmacologic Prophylaxis:   Pharmacologic: hold warfarin, put on Heparin gtt  Mechanical VTE Prophylaxis in Place: Yes    Patient Centered Rounds: I have performed bedside rounds with nursing staff today  Discussions with Specialists or Other Care Team Provider: HF, Renal    Education and Discussions with Family / Patient: patient  Daughter     Time Spent for Care: 30 minutes  More than 50% of total time spent on counseling and coordination of care as described above  Current Length of Stay: 13 day(s)    Current Patient Status: Inpatient   Certification Statement: The patient will continue to require additional inpatient hospital stay due to ongoing as above     Discharge Plan:     Code Status: Level 1 - Full Code      Subjective:   Doing well  No complaints  Objective:     Vitals:   Temp (24hrs), Av 2 °F (36 8 °C), Min:97 8 °F (36 6 °C), Max:98 5 °F (36 9 °C)    Temp:  [97 8 °F (36 6 °C)-98 5 °F (36 9 °C)] 97 8 °F (36 6 °C)  HR:  [77-81] 81  Resp:  [14-16] 16  BP: ()/(60-72) 112/72  SpO2:  [94 %-96 %] 94 %  Body mass index is 27 28 kg/m²  Input and Output Summary (last 24 hours):        Intake/Output Summary (Last 24 hours) at 2021 1224  Last data filed at 2021 1017  Gross per 24 hour   Intake 820 ml   Output 2900 ml   Net -2080 ml       Physical Exam:     Physical Exam  Vitals signs and nursing note reviewed  Constitutional:       General: He is not in acute distress  Cardiovascular:      Rate and Rhythm: Normal rate  Rhythm irregular  Pulmonary:      Effort: No respiratory distress  Abdominal:      General: There is no distension  Tenderness: There is no abdominal tenderness  Musculoskeletal:      Right lower leg: No edema  Left lower leg: No edema  Skin:     Coloration: Skin is not jaundiced  Neurological:      Mental Status: He is oriented to person, place, and time  Additional Data:     Labs:    Results from last 7 days   Lab Units 02/05/21  0453   WBC Thousand/uL 5 86   HEMOGLOBIN g/dL 8 2*   HEMATOCRIT % 25 9*   PLATELETS Thousands/uL 81*   NEUTROS PCT % 65   LYMPHS PCT % 13*   MONOS PCT % 18*   EOS PCT % 4     Results from last 7 days   Lab Units 02/06/21  0519 02/05/21  0453   POTASSIUM mmol/L 4 1 3 7   CHLORIDE mmol/L 107 103   CO2 mmol/L 24 30   BUN mg/dL 108* 122*   CREATININE mg/dL 4 19* 4 77*   CALCIUM mg/dL 9 5 8 7   ALK PHOS U/L  --  81   ALT U/L  --  15   AST U/L  --  26     Results from last 7 days   Lab Units 02/08/21  0505   INR  1 93*       * I Have Reviewed All Lab Data Listed Above  * Additional Pertinent Lab Tests Reviewed:  All Labs Within Last 24 Hours Reviewed    Imaging:    Imaging Reports Reviewed Today Include: all  Imaging Personally Reviewed by Myself Includes:  none    Recent Cultures (last 7 days):           Last 24 Hours Medication List:   Current Facility-Administered Medications   Medication Dose Route Frequency Provider Last Rate    acetaminophen  650 mg Oral Q6H PRN Char Urias, NESSANP      atorvastatin  40 mg Oral Daily Char Renata Urias, CRNP      benzonatate  100 mg Oral TID PRN Brittany Sears PA-C      bumetanide  4 mg Oral BID Yamilet Copas, DO      guaiFENesin  600 mg Oral Q12H [address] [place] TRINY Santos      heparin (porcine)  3-30 Units/kg/hr (Order-Specific) Intravenous Titrated Mahendra Baxter PA-C      melatonin  6 mg Oral HS EL Roth      ondansetron  4 mg Intravenous Q6H PRN EL Scales      oxyCODONE  10 mg Oral Q4H PRN Aramis Griffith MD      oxyCODONE  5 mg Oral Q4H PRN Aramis Griffith MD      polyethylene glycol  17 g Oral Daily PRN Amaya León DO      sevelamer  800 mg Oral TID With Meals Jihan Torres DO      sodium chloride (PF)  3 mL Intravenous Q1H PRN EL Roth      traZODone  25 mg Oral HS EL Alaniz          Today, Patient Was Seen By: Mahendra Baxter PA-C    ** Please Note: Dictation voice to text software may have been used in the creation of this document   **

## 2021-02-08 NOTE — ASSESSMENT & PLAN NOTE
Rate controlled on Lopressor  · Anticoagulated on Coumadin typically  · Discussed with IR today, patient is tentatively scheduled for peritoneal dialysis catheter change on Tuesday     · Hold warfarin tonight (last dose 2/7 PM) and bridge with heparin gtt  · Monitor INR

## 2021-02-09 ENCOUNTER — ANESTHESIA (INPATIENT)
Dept: RADIOLOGY | Facility: HOSPITAL | Age: 81
DRG: 981 | End: 2021-02-09
Payer: COMMERCIAL

## 2021-02-09 ENCOUNTER — APPOINTMENT (INPATIENT)
Dept: RADIOLOGY | Facility: HOSPITAL | Age: 81
DRG: 981 | End: 2021-02-09
Payer: COMMERCIAL

## 2021-02-09 ENCOUNTER — ANESTHESIA EVENT (INPATIENT)
Dept: RADIOLOGY | Facility: HOSPITAL | Age: 81
DRG: 981 | End: 2021-02-09
Payer: COMMERCIAL

## 2021-02-09 VITALS — HEART RATE: 85 BPM

## 2021-02-09 LAB
ANION GAP SERPL CALCULATED.3IONS-SCNC: 5 MMOL/L (ref 4–13)
APTT PPP: 51 SECONDS (ref 23–37)
APTT PPP: 61 SECONDS (ref 23–37)
BUN SERPL-MCNC: 91 MG/DL (ref 5–25)
CALCIUM SERPL-MCNC: 9.2 MG/DL (ref 8.3–10.1)
CHLORIDE SERPL-SCNC: 107 MMOL/L (ref 100–108)
CO2 SERPL-SCNC: 30 MMOL/L (ref 21–32)
CREAT SERPL-MCNC: 3.54 MG/DL (ref 0.6–1.3)
GFR SERPL CREATININE-BSD FRML MDRD: 16 ML/MIN/1.73SQ M
GLUCOSE SERPL-MCNC: 92 MG/DL (ref 65–140)
INR PPP: 1.98 (ref 0.84–1.19)
POTASSIUM SERPL-SCNC: 4.1 MMOL/L (ref 3.5–5.3)
PROTHROMBIN TIME: 22.4 SECONDS (ref 11.6–14.5)
SODIUM SERPL-SCNC: 142 MMOL/L (ref 136–145)

## 2021-02-09 PROCEDURE — 80048 BASIC METABOLIC PNL TOTAL CA: CPT | Performed by: INTERNAL MEDICINE

## 2021-02-09 PROCEDURE — C1769 GUIDE WIRE: HCPCS

## 2021-02-09 PROCEDURE — 0WHG33Z INSERTION OF INFUSION DEVICE INTO PERITONEAL CAVITY, PERCUTANEOUS APPROACH: ICD-10-PCS | Performed by: RADIOLOGY

## 2021-02-09 PROCEDURE — 49418 INSERT TUN IP CATH PERC: CPT | Performed by: RADIOLOGY

## 2021-02-09 PROCEDURE — 99232 SBSQ HOSP IP/OBS MODERATE 35: CPT | Performed by: INTERNAL MEDICINE

## 2021-02-09 PROCEDURE — 49422 REMOVE TUNNELED IP CATH: CPT | Performed by: RADIOLOGY

## 2021-02-09 PROCEDURE — 0WPG33Z REMOVAL OF INFUSION DEVICE FROM PERITONEAL CAVITY, PERCUTANEOUS APPROACH: ICD-10-PCS | Performed by: RADIOLOGY

## 2021-02-09 PROCEDURE — 85730 THROMBOPLASTIN TIME PARTIAL: CPT | Performed by: INTERNAL MEDICINE

## 2021-02-09 PROCEDURE — 85610 PROTHROMBIN TIME: CPT | Performed by: INTERNAL MEDICINE

## 2021-02-09 PROCEDURE — 49422 REMOVE TUNNELED IP CATH: CPT

## 2021-02-09 PROCEDURE — 49418 INSERT TUN IP CATH PERC: CPT

## 2021-02-09 PROCEDURE — 99231 SBSQ HOSP IP/OBS SF/LOW 25: CPT | Performed by: INTERNAL MEDICINE

## 2021-02-09 RX ORDER — PROPOFOL 10 MG/ML
INJECTION, EMULSION INTRAVENOUS AS NEEDED
Status: DISCONTINUED | OUTPATIENT
Start: 2021-02-09 | End: 2021-02-09

## 2021-02-09 RX ORDER — SODIUM CHLORIDE 9 MG/ML
INJECTION, SOLUTION INTRAVENOUS CONTINUOUS PRN
Status: DISCONTINUED | OUTPATIENT
Start: 2021-02-09 | End: 2021-02-09

## 2021-02-09 RX ORDER — FENTANYL CITRATE/PF 50 MCG/ML
25 SYRINGE (ML) INJECTION
Status: DISCONTINUED | OUTPATIENT
Start: 2021-02-09 | End: 2021-02-09 | Stop reason: HOSPADM

## 2021-02-09 RX ORDER — MIDAZOLAM HYDROCHLORIDE 2 MG/2ML
INJECTION, SOLUTION INTRAMUSCULAR; INTRAVENOUS AS NEEDED
Status: DISCONTINUED | OUTPATIENT
Start: 2021-02-09 | End: 2021-02-09

## 2021-02-09 RX ORDER — CEFAZOLIN SODIUM 1 G/3ML
INJECTION, POWDER, FOR SOLUTION INTRAMUSCULAR; INTRAVENOUS AS NEEDED
Status: DISCONTINUED | OUTPATIENT
Start: 2021-02-09 | End: 2021-02-09

## 2021-02-09 RX ORDER — ONDANSETRON 2 MG/ML
4 INJECTION INTRAMUSCULAR; INTRAVENOUS ONCE AS NEEDED
Status: DISCONTINUED | OUTPATIENT
Start: 2021-02-09 | End: 2021-02-09 | Stop reason: HOSPADM

## 2021-02-09 RX ORDER — FENTANYL CITRATE 50 UG/ML
INJECTION, SOLUTION INTRAMUSCULAR; INTRAVENOUS AS NEEDED
Status: DISCONTINUED | OUTPATIENT
Start: 2021-02-09 | End: 2021-02-09

## 2021-02-09 RX ORDER — LIDOCAINE HYDROCHLORIDE 10 MG/ML
INJECTION, SOLUTION EPIDURAL; INFILTRATION; INTRACAUDAL; PERINEURAL AS NEEDED
Status: DISCONTINUED | OUTPATIENT
Start: 2021-02-09 | End: 2021-02-09

## 2021-02-09 RX ORDER — GLYCOPYRROLATE 0.2 MG/ML
INJECTION INTRAMUSCULAR; INTRAVENOUS AS NEEDED
Status: DISCONTINUED | OUTPATIENT
Start: 2021-02-09 | End: 2021-02-09

## 2021-02-09 RX ORDER — DEXAMETHASONE SODIUM PHOSPHATE 10 MG/ML
INJECTION, SOLUTION INTRAMUSCULAR; INTRAVENOUS AS NEEDED
Status: DISCONTINUED | OUTPATIENT
Start: 2021-02-09 | End: 2021-02-09

## 2021-02-09 RX ORDER — ONDANSETRON 2 MG/ML
INJECTION INTRAMUSCULAR; INTRAVENOUS AS NEEDED
Status: DISCONTINUED | OUTPATIENT
Start: 2021-02-09 | End: 2021-02-09

## 2021-02-09 RX ORDER — LIDOCAINE WITH 8.4% SOD BICARB 0.9%(10ML)
SYRINGE (ML) INJECTION CODE/TRAUMA/SEDATION MEDICATION
Status: COMPLETED | OUTPATIENT
Start: 2021-02-09 | End: 2021-02-09

## 2021-02-09 RX ADMIN — FENTANYL CITRATE 25 MCG: 50 INJECTION INTRAMUSCULAR; INTRAVENOUS at 13:55

## 2021-02-09 RX ADMIN — GUAIFENESIN 600 MG: 600 TABLET, EXTENDED RELEASE ORAL at 08:40

## 2021-02-09 RX ADMIN — ONDANSETRON 4 MG: 2 INJECTION INTRAMUSCULAR; INTRAVENOUS at 13:00

## 2021-02-09 RX ADMIN — MIDAZOLAM 2 MG: 1 INJECTION INTRAMUSCULAR; INTRAVENOUS at 13:00

## 2021-02-09 RX ADMIN — PROPOFOL 150 MG: 10 INJECTION, EMULSION INTRAVENOUS at 13:15

## 2021-02-09 RX ADMIN — GUAIFENESIN 600 MG: 600 TABLET, EXTENDED RELEASE ORAL at 21:01

## 2021-02-09 RX ADMIN — GLYCOPYRROLATE 0.1 MG: 0.2 INJECTION, SOLUTION INTRAMUSCULAR; INTRAVENOUS at 13:00

## 2021-02-09 RX ADMIN — SODIUM CHLORIDE: 0.9 INJECTION, SOLUTION INTRAVENOUS at 12:55

## 2021-02-09 RX ADMIN — PHENYLEPHRINE HYDROCHLORIDE 50 MCG/MIN: 10 INJECTION INTRAVENOUS at 13:24

## 2021-02-09 RX ADMIN — CEFAZOLIN 1000 MG: 1 INJECTION, POWDER, FOR SOLUTION INTRAVENOUS at 13:22

## 2021-02-09 RX ADMIN — HEPARIN SODIUM 22 UNITS/KG/HR: 10000 INJECTION, SOLUTION INTRAVENOUS at 07:29

## 2021-02-09 RX ADMIN — MELATONIN TAB 3 MG 6 MG: 3 TAB at 21:03

## 2021-02-09 RX ADMIN — BENZONATATE 100 MG: 100 CAPSULE ORAL at 21:01

## 2021-02-09 RX ADMIN — BUMETANIDE 4 MG: 1 TABLET ORAL at 08:39

## 2021-02-09 RX ADMIN — IOHEXOL 60 ML: 350 INJECTION, SOLUTION INTRAVENOUS at 15:04

## 2021-02-09 RX ADMIN — DEXAMETHASONE SODIUM PHOSPHATE 5 MG: 10 INJECTION, SOLUTION INTRAMUSCULAR; INTRAVENOUS at 15:06

## 2021-02-09 RX ADMIN — FENTANYL CITRATE 25 MCG: 50 INJECTION INTRAMUSCULAR; INTRAVENOUS at 14:15

## 2021-02-09 RX ADMIN — SEVELAMER HYDROCHLORIDE 800 MG: 800 TABLET, FILM COATED PARENTERAL at 17:28

## 2021-02-09 RX ADMIN — Medication 20 ML: at 14:02

## 2021-02-09 RX ADMIN — FENTANYL CITRATE 50 MCG: 50 INJECTION INTRAMUSCULAR; INTRAVENOUS at 13:11

## 2021-02-09 RX ADMIN — TRAZODONE HYDROCHLORIDE 25 MG: 50 TABLET ORAL at 21:01

## 2021-02-09 RX ADMIN — PHENYLEPHRINE HYDROCHLORIDE 100 MCG: 10 INJECTION INTRAVENOUS at 13:20

## 2021-02-09 RX ADMIN — LIDOCAINE HYDROCHLORIDE 50 MG: 10 INJECTION, SOLUTION EPIDURAL; INFILTRATION; INTRACAUDAL; PERINEURAL at 13:15

## 2021-02-09 RX ADMIN — PHENYLEPHRINE HYDROCHLORIDE 100 MCG: 10 INJECTION INTRAVENOUS at 13:24

## 2021-02-09 NOTE — PROGRESS NOTES
Heart Failure/ Pulmonary Hypertension Progress Note - Oliver Sharpe 68 y o  male MRN: [id number]    [facility] Encounter: [id number]      Assessment:    Principal Problem:    Acute on chronic diastolic (congestive) heart failure (HCC)  Active Problems:    Benign essential hypertension    Bradycardia    Dissection of thoracic aorta (HCC)    Hyperlipidemia    Anemia    Stage 4 chronic kidney disease (HCC)    Atrial fibrillation (HCC)    Insomnia    MI (myocardial infarction) (HCC)    ESRD (end stage renal disease) (HCC)      Subjective:   Oliver Sharpe is a 68year-old man with PMH as below who presented to [facility] 01/26/2021 after being contacted by cardiology office to review abnormal renal function  Patient had increased frequency of metolazone dosing and had minimal improvement with no reported weight loss or symptomatic improvement  Case was discussed with Dr Evert Ahumada and decision was made to have patient proceed to ED for further evaluation  Patient seen and examined  Bradycardia improved with holding BB  Had started on  PD however catheter now malfunctioning  Scheduled to have adjustment today in IR  Plan to resume PD treatments this evening  Already transitioned to oral diuretic by nephro  Objective: Intake/ Output: 840/1975/-1 1 L  Weight: bed scale ? ?         Acute on chronic HFpEF - Volume management: Bumex 4mg PO BID  S/P PD catheter placement, scheduled for adjustment due to malfunction today   BP controlled at this point off antihypertensives  Continue to monitor  May need to restart Amlodipine as outpatient    Currently enrolled in Pals program                      TTE 04/24/2019: LVEF 60%  LVIDd 4 31 cm  IVSd 1 4 cm  Grade 1 DD  Normal RV size and RVSF  Mild TR  PASP 35 mmHg                TTE 10/06/2020: LVEF 60%  LVIDd 4 69 cm  IVSd 1 41 cm  Grade 2 DD  Normal RV size and RVSF  KUNAL  Mild MR  Severe TR                   Paroxysmal atrial [id number] = 4 (age, CHF, HTN)  Anticoagulation on Coumadin  Persistently bradycardic this admit into the mid 30's  Trial off Metop for now and monitor  Bradycardia  With rates in the 30-40s seen on telemetry this admit  Improved  Consider further monitoring as outpatient, ie Zio patch or HM to further evaluate   BB d/c for now  Continue to monitor response as outpatient        Hypertension  Well controlled  Amlodipine 5 mg daily currently on hold for soft BP        Hyperlipidemia  FLP 1/26/21: TC 88, TD 69, HDL 42, LDL 32  Continue on atorvastatin 40 mg daily       Chronic kidney disease, stage IV Baseline creatinine was 2 0-2 3, new baseline likely 3 5-4  S/P PD cath placement on 2/3/21     Follows with Dr Levon Sage as outpatient  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 with hemiarch reconstruction and aortic valve resuspension with a 26 mm Dacron graft on 12/15/2019 by Dr Caroline Carr               Benign prostatic hyperplasia  History of COVID-19 infection: diagnosed in 07/2020  Review of Systems   All other systems reviewed and are negative  [facility] (day, reason): [place] catheter (day, reason):    Vitals: Blood pressure 117/68, pulse 85, temperature 98 4 °F (36 9 °C), resp  rate 15, height 5' 7" (1 702 m), weight 76 1 kg (167 lb 12 8 oz), SpO2 95 %  , Body mass index is 26 28 kg/m² , I/O last 3 completed shifts: In: 840 [P O :840]  Out: 3375 [Urine:3375]  No intake/output data recorded  Wt Readings from Last 3 Encounters:   02/09/21 76 1 kg (167 lb 12 8 oz)   01/19/21 80 3 kg (177 lb)   01/11/21 78 9 kg (174 lb)       Intake/Output Summary (Last 24 hours) at 2/9/2021 1006  Last data filed at 2/8/2021 2023  Gross per 24 hour   Intake 840 ml   Output 1425 ml   Net -585 ml     I/O last 3 completed shifts:   In: 840 [P O :840]  Out: 5417 [Urine:3375]          Physical Exam:  Vitals:    02/08/21 2233 02/08/21 2233 02/09/21 0600 02/09/21 0708   BP: 111/75 111/75  117/68   Pulse: 85     Resp: 16   15   Temp: 99 3 °F (37 4 °C) 99 3 °F (37 4 °C)  98 4 °F (36 9 °C)   TempSrc:       SpO2:  95%     Weight:   76 1 kg (167 lb 12 8 oz)    Height:           GEN: Oliver Shrape appears well, alert and oriented x 3, pleasant and cooperative   HEENT: pupils equal, round, and reactive to light; extraocular muscles intact  NECK: supple, no carotid bruits   HEART: regular rhythm, normal S1 and S2, no murmurs, clicks, gallops or rubs, JVP is elevated     LUNGS: coarse to auscultation bilaterally; +expiratory wheezes, few scattered rales, no rhonchi   ABDOMEN: normal bowel sounds, soft, no tenderness, no distention  EXTREMITIES: peripheral pulses normal; no clubbing, cyanosis, or edema  NEURO: no focal findings   SKIN: normal without suspicious lesions on exposed skin      Current Facility-Administered Medications:     acetaminophen (TYLENOL) tablet 650 mg, 650 mg, Oral, Q6H PRN, EL Roth    atorvastatin (LIPITOR) tablet 40 mg, 40 mg, Oral, Daily, EL Roth, 40 mg at 02/08/21 0810    benzonatate (TESSALON PERLES) capsule 100 mg, 100 mg, Oral, TID PRN, Brittany Sears PA-C, 100 mg at 02/07/21 1705    bumetanide (BUMEX) tablet 4 mg, 4 mg, Oral, BID, Jet Andre, DO, 4 mg at 02/09/21 0839    guaiFENesin (MUCINEX) 12 hr tablet 600 mg, 600 mg, Oral, Q12H [address], [place] D TRINY Santos, 600 mg at 02/09/21 0840    heparin (porcine) 25,000 units in 0 45% NaCl 250 mL infusion (premix), 3-30 Units/kg/hr (Order-Specific), Intravenous, Titrated, Rashida Salas PA-C, Last Rate: 16 5 mL/hr at 02/09/21 0729, 22 Units/kg/hr at 02/09/21 0729    melatonin tablet 6 mg, 6 mg, Oral, HS, EL Roth, 6 mg at 02/08/21 2126    ondansetron (ZOFRAN) injection 4 mg, 4 mg, Intravenous, Q6H PRN, EL Roth    oxyCODONE (ROXICODONE) immediate release tablet 10 mg, 10 mg, Oral, Q4H PRN, Dinah Mandel MD, 10 mg at 02/04/21 7785    oxyCODONE (ROXICODONE) IR tablet 5 mg, 5 mg, Oral, Q4H PRN, Cecilia Galicia MD    polyethylene glycol [facility]) packet 17 g, 17 g, Oral, Daily PRN, Aida Cullen DO    sevelamer (RENAGEL) tablet 800 mg, 800 mg, Oral, TID With Meals, Steve Torres DO, 800 mg at 02/08/21 1739    Insert peripheral IV, , , Once **AND** sodium chloride (PF) 0 9 % injection 3 mL, 3 mL, Intravenous, Q1H PRN, EL Roth    traZODone (DESYREL) tablet 25 mg, 25 mg, Oral, HS, EL Roth, 25 mg at 02/08/21 4614      Labs & Results:        Results from last 7 days   Lab Units 02/05/21  0453 02/04/21  0516   WBC Thousand/uL 5 86 5 81   HEMOGLOBIN g/dL 8 2* 8 2*   HEMATOCRIT % 25 9* 26 1*   PLATELETS Thousands/uL 81* 88*         Results from last 7 days   Lab Units 02/06/21  0519 02/05/21  0453 02/04/21  0516   POTASSIUM mmol/L 4 1 3 7 4 3   CHLORIDE mmol/L 107 103 103   CO2 mmol/L 24 30 27   BUN mg/dL 108* 122* 129*   CREATININE mg/dL 4 19* 4 77* 4 86*   CALCIUM mg/dL 9 5 8 7 9 1   ALK PHOS U/L  --  81  --    ALT U/L  --  15  --    AST U/L  --  26  --      Results from last 7 days   Lab Units 02/09/21  0418 02/08/21  1400 02/08/21  0505   INR  1 98* 1 90* 1 93*         Counseling / Coordination of Care  Total floor / unit time spent today 20 minutes  Greater than 50% of total time was spent with the patient and / or family counseling and / or coordination of care  A description of the counseling / coordination of care: 20  Jenelle Ashby

## 2021-02-09 NOTE — TREATMENT PLAN
Called and told PD catheter done nd chart reviewed and OK per IR  I will let it healovernight and start tomorrow  Will check BMP

## 2021-02-09 NOTE — PLAN OF CARE
Problem: Potential for Falls  Goal: Patient will remain free of falls  Description: INTERVENTIONS:  - Assess patient frequently for physical needs  -  Identify cognitive and physical deficits and behaviors that affect risk of falls    -  [place] fall precautions as indicated by assessment   - Educate patient/family on patient safety including physical limitations  - Instruct patient to call for assistance with activity based on assessment  - Modify environment to reduce risk of injury  - Consider OT/PT consult to assist with strengthening/mobility  Outcome: Progressing     Problem: CARDIOVASCULAR - ADULT  Goal: Maintains optimal cardiac output and hemodynamic stability  Description: INTERVENTIONS:  - Monitor I/O, vital signs and rhythm  - Monitor for S/S and trends of decreased cardiac output  - Administer and titrate ordered vasoactive medications to optimize hemodynamic stability  - Assess quality of pulses, skin color and temperature  - Assess for signs of decreased coronary artery perfusion  - Instruct patient to report change in severity of symptoms  Outcome: Progressing     Problem: METABOLIC, FLUID AND ELECTROLYTES - ADULT  Goal: Electrolytes maintained within normal limits  Description: INTERVENTIONS:  - Monitor labs and assess patient for signs and symptoms of electrolyte imbalances  - Administer electrolyte replacement as ordered  - Monitor response to electrolyte replacements, including repeat lab results as appropriate  - Instruct patient on fluid and nutrition as appropriate  Outcome: Progressing  Goal: Fluid balance maintained  Description: INTERVENTIONS:  - Monitor labs   - Monitor I/O and WT  - Instruct patient on fluid and nutrition as appropriate  - Assess for signs & symptoms of volume excess or deficit  Outcome: Progressing     Problem: MUSCULOSKELETAL - ADULT  Goal: Maintain or return mobility to safest level of function  Description: INTERVENTIONS:  - Assess patient's ability to carry out ADLs; assess patient's baseline for ADL function and identify physical deficits which impact ability to perform ADLs (bathing, care of mouth/teeth, toileting, grooming, dressing, etc )  - Assess/evaluate cause of self-care deficits   - Assess range of motion  - Assess patient's mobility  - Assess patient's need for assistive devices and provide as appropriate  - Encourage maximum independence but intervene and supervise when necessary  - Involve family in performance of ADLs  - Assess for home care needs following discharge   - Consider OT consult to assist with ADL evaluation and planning for discharge  - Provide patient education as appropriate  Outcome: Progressing     Problem: Nutrition/Hydration-ADULT  Goal: Nutrient/Hydration intake appropriate for improving, restoring or maintaining nutritional needs  Description: Monitor and assess patient's nutrition/hydration status for malnutrition  Collaborate with interdisciplinary team and initiate plan and interventions as ordered  Monitor patient's weight and dietary intake as ordered or per policy  Utilize nutrition screening tool and intervene as necessary  Determine patient's food preferences and provide high-protein, high-caloric foods as appropriate       INTERVENTIONS:  - Monitor oral intake, urinary output, labs, and treatment plans  - Assess nutrition and hydration status and recommend course of action  - Evaluate amount of meals eaten  - Assist patient with eating if necessary   - Allow adequate time for meals  - Recommend/ encourage appropriate diets, oral nutritional supplements, and vitamin/mineral supplements  - Order, calculate, and assess calorie counts as needed  - Recommend, monitor, and adjust tube feedings and TPN/PPN based on assessed needs  - Assess need for intravenous fluids  - Provide specific nutrition/hydration education as appropriate  - Include patient/family/caregiver in decisions related to nutrition  Outcome: Progressing     Problem: PAIN - ADULT  Goal: Verbalizes/displays adequate comfort level or baseline comfort level  Description: Interventions:  - Encourage patient to monitor pain and request assistance  - Assess pain using appropriate pain scale  - Administer analgesics based on type and severity of pain and evaluate response  - Implement non-pharmacological measures as appropriate and evaluate response  - Consider cultural and social influences on pain and pain management  - Notify physician/advanced practitioner if interventions unsuccessful or patient reports new pain  Outcome: Progressing     Problem: INFECTION - ADULT  Goal: Absence or prevention of progression during hospitalization  Description: INTERVENTIONS:  - Assess and monitor for signs and symptoms of infection  - Monitor lab/diagnostic results  - Monitor all insertion sites, i e  indwelling lines, tubes, and drains  - Monitor endotracheal if appropriate and nasal secretions for changes in amount and color  - [place] appropriate cooling/warming therapies per order  - Administer medications as ordered  - Instruct and encourage patient and family to use good hand hygiene technique  - Identify and instruct in appropriate isolation precautions for identified infection/condition  Outcome: Progressing     Problem: SAFETY ADULT  Goal: Patient will remain free of falls  Description: INTERVENTIONS:  - Assess patient frequently for physical needs  -  Identify cognitive and physical deficits and behaviors that affect risk of falls    -  [place] fall precautions as indicated by assessment   - Educate patient/family on patient safety including physical limitations  - Instruct patient to call for assistance with activity based on assessment  - Modify environment to reduce risk of injury  - Consider OT/PT consult to assist with strengthening/mobility  Outcome: Progressing  Goal: Maintain or return to baseline ADL function  Description: INTERVENTIONS:  -  Assess patient's ability to carry out ADLs; assess patient's baseline for ADL function and identify physical deficits which impact ability to perform ADLs (bathing, care of mouth/teeth, toileting, grooming, dressing, etc )  - Assess/evaluate cause of self-care deficits   - Assess range of motion  - Assess patient's mobility; develop plan if impaired  - Assess patient's need for assistive devices and provide as appropriate  - Encourage maximum independence but intervene and supervise when necessary  - Involve family in performance of ADLs  - Assess for home care needs following discharge   - Consider OT consult to assist with ADL evaluation and planning for discharge  - Provide patient education as appropriate  Outcome: Progressing  Goal: Maintain or return mobility status to optimal level  Description: INTERVENTIONS:  - Assess patient's baseline mobility status (ambulation, transfers, stairs, etc )    - Identify cognitive and physical deficits and behaviors that affect mobility  - Identify mobility aids required to assist with transfers and/or ambulation (gait belt, sit-to-stand, lift, walker, cane, etc )  - [place] fall precautions as indicated by assessment  - Record patient progress and toleration of activity level on Mobility SBAR; progress patient to next Phase/Stage  - Instruct patient to call for assistance with activity based on assessment  - Consider rehabilitation consult to assist with strengthening/weightbearing, etc   Outcome: Progressing     Problem: DISCHARGE PLANNING  Goal: Discharge to home or other facility with appropriate resources  Description: INTERVENTIONS:  - Identify barriers to discharge w/patient and caregiver  - Arrange for needed discharge resources and transportation as appropriate  - Identify discharge learning needs (meds, wound care, etc )  - Arrange for interpretive services to assist at discharge as needed  - Refer to Case Management Department for coordinating discharge planning if the patient needs post-hospital services based on physician/advanced practitioner order or complex needs related to functional status, cognitive ability, or social support system  Outcome: Progressing     Problem: Knowledge Deficit  Goal: Patient/family/caregiver demonstrates understanding of disease process, treatment plan, medications, and discharge instructions  Description: Complete learning assessment and assess knowledge base    Interventions:  - Provide teaching at level of understanding  - Provide teaching via preferred learning methods  Outcome: Progressing

## 2021-02-09 NOTE — ASSESSMENT & PLAN NOTE
Lab Results   Component Value Date    EGFR 13 02/06/2021    EGFR 11 02/05/2021    EGFR 11 02/04/2021    CREATININE 4 19 (H) 02/06/2021    CREATININE 4 77 (H) 02/05/2021    CREATININE 4 86 (H) 02/04/2021     · Baseline creatinine 2-2 9 approximately    4 19 on presentation, now ESRD  · Holding norvasc 2/2 soft BP  · Known to Dr Irene Paez as outpatient-was on HD in past  · Peritoneal dialysis per nephrology however catheter needs to be adjusted, IR following, planning for today  · Will need renal clearance for discharge

## 2021-02-09 NOTE — QUICK NOTE
Spoke with IR, hold off on hep gtt and coumadin tonight  Restart coumadin tomorrow      Fredy Coley PA-C

## 2021-02-09 NOTE — ANESTHESIA PREPROCEDURE EVALUATION
Procedure:  IR PERITONEAL DIALYSIS CATHETER REMOVAL AND PLACEMENT NEW SITE    Relevant Problems   CARDIO   (+) Atrial fibrillation (HCC)   (+) Benign essential hypertension   (+) GONZALES (dyspnea on exertion)   (+) Dissection of thoracic aorta (HCC)   (+) Hyperlipidemia   (+) Paroxysmal atrial fibrillation (HCC)      ENDO   (+) 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 (HCC)      /RENAL   (+) Acute renal failure superimposed on stage 4 chronic kidney disease (HCC)   (+) Benign prostatic hyperplasia without lower urinary tract symptoms   (+) Chronic kidney disease-mineral and bone disorder   (+) ESRD (end stage renal disease) (HCC)      HEMATOLOGY   (+) Anemia      MUSCULOSKELETAL   (+) Lumbar pain   (+) Rheumatoid arthritis (HCC)   (+) Secondary osteoarthritis of right shoulder      PULMONARY   (+) GONZALES (dyspnea on exertion)        Physical Exam    Airway    Mallampati score: I  TM Distance: >3 FB  Neck ROM: full     Dental   lower dentures and upper dentures,     Cardiovascular  Rhythm: irregular, Rate: abnormal, No friction rub,     Pulmonary  Breath sounds clear to auscultation,     Other Findings        Anesthesia Plan  ASA Score- 3     Anesthesia Type- general with ASA Monitors  Additional Monitors:   Airway Plan: LMA  Plan Factors-Exercise tolerance (METS): >4 METS  Chart reviewed  EKG reviewed  Imaging results reviewed  Existing labs reviewed  Patient summary reviewed  Induction- intravenous  Postoperative Plan- Plan for postoperative opioid use  Planned trial extubation    Informed Consent- Anesthetic plan and risks discussed with patient  I personally reviewed this patient with the CRNA  Discussed and agreed on the Anesthesia Plan with the ABIGAIL Melgoza

## 2021-02-09 NOTE — ASSESSMENT & PLAN NOTE
Lab Results   Component Value Date    EGFR 13 02/06/2021    EGFR 11 02/05/2021    EGFR 11 02/04/2021    CREATININE 4 19 (H) 02/06/2021    CREATININE 4 77 (H) 02/05/2021    CREATININE 4 86 (H) 02/04/2021   · Baseline creatinine 2-2 9 approximately    4 19 on presentation, now ESRD  · Holding norvasc 2/2 soft BP  · Known to Dr Kristen Saldaña as outpatient-was on HD in past  · Peritoneal dialysis per nephrology however catheter needs to be adjusted, IR following, planning for today  · Will need renal clearance for discharge

## 2021-02-09 NOTE — ANESTHESIA POSTPROCEDURE EVALUATION
Post-Op Assessment Note    CV Status:  Stable  Pain Score: 0    Pain management: adequate     Mental Status:  Sleepy   Hydration Status:  Stable   PONV Controlled:  None   Airway Patency:  Patent      Post Op Vitals Reviewed: Yes      Staff: CRNA   Comments: Pt  to [facility]  Report given  Course uneventful  VSS  Airway patent  PONCE' s x4  No complications documented      BP 96/56 (02/09/21 1539)    Temp      Pulse 78 (02/09/21 1539)   Resp 20 (02/09/21 1539)    SpO2 100 % (02/09/21 1539)

## 2021-02-09 NOTE — NURSING NOTE
Contacted by Leydi Jack with IR  Asked to hold heparin drip at this time because patient will be going for new PD cath insertion earlier than scheduled

## 2021-02-09 NOTE — SEDATION DOCUMENTATION
Patient tolerated PD cath placement and old removal  without any immediate complications  Anesthesia with patient

## 2021-02-09 NOTE — PROGRESS NOTES
NEPHROLOGY PROGRESS NOTE    Oliver Sharpe 68 y o  male MRN: [id number]  Unit/Bed#: [facility] 806-01 Encounter: [id number]  Reason for Consult:  End-stage renal disease    The patient is awake and alert and feeling okay  He he has no acute complaints for me no shortness of breath  He did tell me that he has found someone to train with him for peritoneal dialysis on discharge  He is scheduled undergo catheter adjustment today  ASSESSMENT/PLAN:  1  Renal    The patient is end-stage renal disease he still has residual urine output as he produce close to 2 L yesterday  No volume overload clinically and last check on electrolytes were normal   The patient has progressed end-stage renal disease and is going to undergo he peritoneal dialysis  His PD catheter apparently was not functioning so is going to be replaced today in Interventional Radiology  We will initiate peritoneal dialysis once cleared by that service  He will be training and I will contact the outpatient unit providing him with the phone number name of the person who will be training with him  He told me it was [facility] [phone]  When patient start urgent start peritoneal dialysis they must have training arranged at the center on discharge  SUBJECTIVE:  Review of Systems   Constitution: Negative for chills, diaphoresis, fever and night sweats  HENT: Negative  Eyes: Negative  Cardiovascular: Negative  Negative for chest pain, dyspnea on exertion, leg swelling and orthopnea  Respiratory: Negative  Negative for cough, shortness of breath, sputum production and wheezing  Skin: Negative  Gastrointestinal: Negative for abdominal pain, diarrhea, nausea and vomiting  Genitourinary: Negative for dysuria, flank pain, hematuria and incomplete emptying  Neurological: Positive for weakness  Negative for dizziness, focal weakness and headaches     Psychiatric/Behavioral: Negative for altered mental status, depression, hallucinations and hypervigilance  OBJECTIVE:  Current Weight: Weight - Scale: 76 1 kg (167 lb 12 8 oz)  Vitals:Temp (24hrs), Av °F (37 2 °C), Min:98 4 °F (36 9 °C), Max:99 3 °F (37 4 °C)  Current: Temperature: 98 4 °F (36 9 °C)   Blood pressure 117/68, pulse 85, temperature 98 4 °F (36 9 °C), resp  rate 15, height 5' 7" (1 702 m), weight 76 1 kg (167 lb 12 8 oz), SpO2 95 %  , Body mass index is 26 28 kg/m²  Intake/Output Summary (Last 24 hours) at 2021 0954  Last data filed at 2021  Gross per 24 hour   Intake 840 ml   Output 1425 ml   Net -585 ml       Physical Exam: /68   Pulse 85   Temp 98 4 °F (36 9 °C)   Resp 15   Ht 5' 7" (1 702 m)   Wt 76 1 kg (167 lb 12 8 oz)   SpO2 95%   BMI 26 28 kg/m²   Physical Exam  Constitutional:       General: He is not in acute distress  Appearance: He is not ill-appearing or toxic-appearing  HENT:      Head: Normocephalic and atraumatic  Neck:      Musculoskeletal: Neck supple  Vascular: No JVD  Cardiovascular:      Rate and Rhythm: Normal rate and regular rhythm  Heart sounds: No friction rub  No gallop  Comments: No edema  Pulmonary:      Effort: Pulmonary effort is normal  No tachypnea  Breath sounds: Normal breath sounds  No decreased breath sounds, wheezing, rhonchi or rales  Abdominal:      General: Bowel sounds are normal       Palpations: Abdomen is soft  Tenderness: There is no abdominal tenderness  There is no rebound  Neurological:      General: No focal deficit present  Mental Status: He is alert and oriented to person, place, and time  Psychiatric:         Mood and Affect: Mood normal  Mood is not anxious  Behavior: Behavior normal  Behavior is not agitated           Medications:    Current Facility-Administered Medications:     acetaminophen (TYLENOL) tablet 650 mg, 650 mg, Oral, Q6H PRN, EL Roth    atorvastatin (LIPITOR) tablet 40 mg, 40 mg, Oral, Daily, EL Roth, 40 mg at 02/08/21 0810    benzonatate (TESSALON PERLES) capsule 100 mg, 100 mg, Oral, TID PRN, Brittany Sears PA-C, 100 mg at 02/07/21 1705    bumetanide (BUMEX) tablet 4 mg, 4 mg, Oral, BID, Jet Andre, DO, 4 mg at 02/09/21 0839    guaiFENesin (MUCINEX) 12 hr tablet 600 mg, 600 mg, Oral, Q12H [address], [place] D TRINY Santos, 600 mg at 02/09/21 0840    heparin (porcine) 25,000 units in 0 45% NaCl 250 mL infusion (premix), 3-30 Units/kg/hr (Order-Specific), Intravenous, Titrated, Andreina Mcgregor PA-C, Last Rate: 16 5 mL/hr at 02/09/21 0729, 22 Units/kg/hr at 02/09/21 0729    melatonin tablet 6 mg, 6 mg, Oral, HS, EL Roth, 6 mg at 02/08/21 2126    ondansetron (ZOFRAN) injection 4 mg, 4 mg, Intravenous, Q6H PRN, EL Roth    oxyCODONE (ROXICODONE) immediate release tablet 10 mg, 10 mg, Oral, Q4H PRN, Dasha Quiles MD, 10 mg at 02/04/21 2325    oxyCODONE (ROXICODONE) IR tablet 5 mg, 5 mg, Oral, Q4H PRN, Dasha Quiles MD    polyethylene glycol (MIRALAX) packet 17 g, 17 g, Oral, Daily PRN, Selena Zabalas, DO    sevelamer (RENAGEL) tablet 800 mg, 800 mg, Oral, TID With Meals, Kaley Torres DO, 800 mg at 02/08/21 1739    Insert peripheral IV, , , Once **AND** sodium chloride (PF) 0 9 % injection 3 mL, 3 mL, Intravenous, Q1H PRN, EL Roth    traZODone (DESYREL) tablet 25 mg, 25 mg, Oral, HS, EL Roth, 25 mg at 02/08/21 2124    Laboratory Results:  Lab Results   Component Value Date    WBC 5 86 02/05/2021    HGB 8 2 (L) 02/05/2021    HCT 25 9 (L) 02/05/2021    MCV 97 02/05/2021    PLT 81 (L) 02/05/2021     Lab Results   Component Value Date    SODIUM 139 02/06/2021    K 4 1 02/06/2021     02/06/2021    CO2 24 02/06/2021     (H) 02/06/2021    CREATININE 4 19 (H) 02/06/2021    GLUC 96 02/06/2021    CALCIUM 9 5 02/06/2021     Lab Results   Component Value Date    CALCIUM 9 5 02/06/2021    PHOS 5 4 (H) 01/30/2021     No results found for: LABPROT

## 2021-02-09 NOTE — PROGRESS NOTES
Progress Note - Oliver SEBASTIAN Core 1940, 68 y o  male MRN: [id number]    Unit/Bed#: [facility] 806-01 Encounter: [id number]    Primary Care Provider: EL Yusuf   Date and time admitted to hospital: 1/26/2021  1:24 PM      * Acute on chronic diastolic (congestive) heart failure ([facility] [address] )  Assessment & Plan  Presented with 15 lb weight gain in one month, GONZALES, worsening lower extremity edema  Outpatient cardiology team ordered extra dose of diuretic however no improvement  Patient was noted to have AFSHIN on outpatient labs and referred to ED  · Outpatient diuretic regimen bumex 4 mg PO BID with k 20 BID and metolazone 5 mg  2 x weekly  · Heart failure following, was on Bumex drip for several days and now transitioned to PO Bumex  · Last echo 10/2020 showed preserved EF  Mild MR  Severe TR    · I/O, daily weights, low NA diet with FR  · Monitor volume status--improving with PD            ESRD (end stage renal disease) [facility])  Assessment & Plan  Lab Results   Component Value Date    EGFR 13 02/06/2021    EGFR 11 02/05/2021    EGFR 11 02/04/2021    CREATININE 4 19 (H) 02/06/2021    CREATININE 4 77 (H) 02/05/2021    CREATININE 4 86 (H) 02/04/2021   · Baseline creatinine 2-2 9 approximately  4 19 on presentation, now ESRD  · Holding norvasc 2/2 soft BP  · Known to Dr Demetria John as outpatient-was on HD in past  · Peritoneal dialysis per nephrology however catheter needs to be adjusted, IR following, planning for today  · Will need renal clearance for discharge     Insomnia  Assessment & Plan  Monitor symptoms   · Continue melatonin  · Continue Trazodone HS    Atrial fibrillation (HCC)  Assessment & Plan  Rate controlled on Lopressor    · Anticoagulated on Coumadin typically  · Discussed with IR, patient is tentatively scheduled for peritoneal dialysis catheter change today  · Held warfarin on 2/8 (last dose 2/7 PM) and bridging with heparin gtt  · Monitor INR     Anemia  Assessment & Plan  Multifactorial in setting of CKD  · Monitor hemoglobin closely    Hyperlipidemia  Assessment & Plan  · Continue statin    Dissection of thoracic aorta [facility])  Assessment & Plan  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  · 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  Risk associated with surgical options therefore recommended continued observation  Bradycardia  Assessment & Plan  Also with 2 second pause noted earlier in hospital stay  · Lopressor held  · Asymptomatic  · Appreciate cardiology recommendations, possible Zio patch as outpatient     Benign essential hypertension  Assessment & Plan  Monitor blood pressures, have actually been low  · Norvasc and Lopressor on hold        VTE Pharmacologic Prophylaxis:   Pharmacologic: Heparin Drip  Mechanical VTE Prophylaxis in Place: Yes    Patient Centered Rounds: I have performed bedside rounds with nursing staff today  Discussions with Specialists or Other Care Team Provider: appreciate renal, IR, HF  CM    Education and Discussions with Family / Patient: patient  Will call daughter later  Time Spent for Care: 30 minutes  More than 50% of total time spent on counseling and coordination of care as described above  Current Length of Stay: 14 day(s)    Current Patient Status: Inpatient   Certification Statement: The patient will continue to require additional inpatient hospital stay due to adjustment of PD cath today  Discharge Plan: when cleared by renal      Code Status: Level 1 - Full Code      Subjective:   Doing well today  No complaints  Objective:     Vitals:   Temp (24hrs), Av °F (37 2 °C), Min:98 4 °F (36 9 °C), Max:99 3 °F (37 4 °C)    Temp:  [98 4 °F (36 9 °C)-99 3 °F (37 4 °C)] 98 4 °F (36 9 °C)  HR:  [78-85] 85  Resp:  [15-18] 15  BP: (111-117)/(68-75) 117/68  SpO2:  [95 %-98 %] 95 %  Body mass index is 26 28 kg/m²       Input and Output Summary (last 24 hours): Intake/Output Summary (Last 24 hours) at 2/9/2021 1116  Last data filed at 2/8/2021 2023  Gross per 24 hour   Intake 660 ml   Output 1425 ml   Net -765 ml       Physical Exam:     Physical Exam  Vitals signs and nursing note reviewed  Constitutional:       General: He is not in acute distress  Cardiovascular:      Rate and Rhythm: Normal rate and regular rhythm  Pulmonary:      Comments: Coarse   Abdominal:      General: There is no distension  Tenderness: There is no abdominal tenderness  Comments: PD cath noted    Musculoskeletal:      Right lower leg: No edema  Left lower leg: No edema  Neurological:      Mental Status: He is oriented to person, place, and time  Psychiatric:      Comments: Very pleasant          Additional Data:     Labs:    Results from last 7 days   Lab Units 02/05/21  0453   WBC Thousand/uL 5 86   HEMOGLOBIN g/dL 8 2*   HEMATOCRIT % 25 9*   PLATELETS Thousands/uL 81*   NEUTROS PCT % 65   LYMPHS PCT % 13*   MONOS PCT % 18*   EOS PCT % 4     Results from last 7 days   Lab Units 02/06/21  0519 02/05/21  0453   POTASSIUM mmol/L 4 1 3 7   CHLORIDE mmol/L 107 103   CO2 mmol/L 24 30   BUN mg/dL 108* 122*   CREATININE mg/dL 4 19* 4 77*   CALCIUM mg/dL 9 5 8 7   ALK PHOS U/L  --  81   ALT U/L  --  15   AST U/L  --  26     Results from last 7 days   Lab Units 02/09/21  0418   INR  1 98*       * I Have Reviewed All Lab Data Listed Above  * Additional Pertinent Lab Tests Reviewed:  All Labs Within Last 24 Hours Reviewed    Imaging:    Imaging Reports Reviewed Today Include: all  Imaging Personally Reviewed by Myself Includes:  none    Recent Cultures (last 7 days):           Last 24 Hours Medication List:   Current Facility-Administered Medications   Medication Dose Route Frequency Provider Last Rate    acetaminophen  650 mg Oral Q6H PRN EL Roth      atorvastatin  40 mg Oral Daily EL Roth      benzonatate  100 mg Oral TID PRN Brittany Sears PA-C  bumetanide  4 mg Oral BID Lonzell Closs, DO      guaiFENesin  600 mg Oral Q12H [facility] Panchito Santos PA-C      heparin (porcine)  3-30 Units/kg/hr (Order-Specific) Intravenous Titrated Bettina Reis PA-C 22 Units/kg/hr (02/09/21 0729)    melatonin  6 mg Oral HS LE Roth      ondansetron  4 mg Intravenous Q6H PRN EL Barrow      oxyCODONE  10 mg Oral Q4H PRN Ketan Sawyer MD      oxyCODONE  5 mg Oral Q4H PRN Ketan Sawyer MD      polyethylene glycol  17 g Oral Daily PRN Lonzell Closs, DO      sevelamer  800 mg Oral TID With Meals Leonila Torres DO      sodium chloride (PF)  3 mL Intravenous Q1H PRN EL Roth      traZODone  25 mg Oral HS EL Barrow          Today, Patient Was Seen By: Bettina Reis PA-C    ** Please Note: Dictation voice to text software may have been used in the creation of this document   **

## 2021-02-09 NOTE — CASE MANAGEMENT
Late note for: 2/8 Dr Reggie Redman approached  with concerns that the patient made statements that he can't do his own PD and he will not have help  Shortly after that the [employer] member from [address] [place] [address] told  that the daughter found a family friend to assist the patient with PD  CM relayed that information to Dr Reggie Redman and requested he call the patients daughter  2/9/2021  This morning the [employer] member told CM that daughter would like  to call her

## 2021-02-09 NOTE — PLAN OF CARE
Problem: Potential for Falls  Goal: Patient will remain free of falls  Description: INTERVENTIONS:  - Assess patient frequently for physical needs  -  Identify cognitive and physical deficits and behaviors that affect risk of falls    -  [place] fall precautions as indicated by assessment   - Educate patient/family on patient safety including physical limitations  - Instruct patient to call for assistance with activity based on assessment  - Modify environment to reduce risk of injury  - Consider OT/PT consult to assist with strengthening/mobility  Outcome: Progressing     Problem: CARDIOVASCULAR - ADULT  Goal: Maintains optimal cardiac output and hemodynamic stability  Description: INTERVENTIONS:  - Monitor I/O, vital signs and rhythm  - Monitor for S/S and trends of decreased cardiac output  - Administer and titrate ordered vasoactive medications to optimize hemodynamic stability  - Assess quality of pulses, skin color and temperature  - Assess for signs of decreased coronary artery perfusion  - Instruct patient to report change in severity of symptoms  Outcome: Progressing     Problem: MUSCULOSKELETAL - ADULT  Goal: Maintain or return mobility to safest level of function  Description: INTERVENTIONS:  - Assess patient's ability to carry out ADLs; assess patient's baseline for ADL function and identify physical deficits which impact ability to perform ADLs (bathing, care of mouth/teeth, toileting, grooming, dressing, etc )  - Assess/evaluate cause of self-care deficits   - Assess range of motion  - Assess patient's mobility  - Assess patient's need for assistive devices and provide as appropriate  - Encourage maximum independence but intervene and supervise when necessary  - Involve family in performance of ADLs  - Assess for home care needs following discharge   - Consider OT consult to assist with ADL evaluation and planning for discharge  - Provide patient education as appropriate  Outcome: Progressing Problem: Nutrition/Hydration-ADULT  Goal: Nutrient/Hydration intake appropriate for improving, restoring or maintaining nutritional needs  Description: Monitor and assess patient's nutrition/hydration status for malnutrition  Collaborate with interdisciplinary team and initiate plan and interventions as ordered  Monitor patient's weight and dietary intake as ordered or per policy  Utilize nutrition screening tool and intervene as necessary  Determine patient's food preferences and provide high-protein, high-caloric foods as appropriate       INTERVENTIONS:  - Monitor oral intake, urinary output, labs, and treatment plans  - Assess nutrition and hydration status and recommend course of action  - Evaluate amount of meals eaten  - Assist patient with eating if necessary   - Allow adequate time for meals  - Recommend/ encourage appropriate diets, oral nutritional supplements, and vitamin/mineral supplements  - Order, calculate, and assess calorie counts as needed  - Recommend, monitor, and adjust tube feedings and TPN/PPN based on assessed needs  - Assess need for intravenous fluids  - Provide specific nutrition/hydration education as appropriate  - Include patient/family/caregiver in decisions related to nutrition  Outcome: Progressing     Problem: PAIN - ADULT  Goal: Verbalizes/displays adequate comfort level or baseline comfort level  Description: Interventions:  - Encourage patient to monitor pain and request assistance  - Assess pain using appropriate pain scale  - Administer analgesics based on type and severity of pain and evaluate response  - Implement non-pharmacological measures as appropriate and evaluate response  - Consider cultural and social influences on pain and pain management  - Notify physician/advanced practitioner if interventions unsuccessful or patient reports new pain  Outcome: Progressing     Problem: INFECTION - ADULT  Goal: Absence or prevention of progression during hospitalization  Description: INTERVENTIONS:  - Assess and monitor for signs and symptoms of infection  - Monitor lab/diagnostic results  - Monitor all insertion sites, i e  indwelling lines, tubes, and drains  - Monitor endotracheal if appropriate and nasal secretions for changes in amount and color  - [place] appropriate cooling/warming therapies per order  - Administer medications as ordered  - Instruct and encourage patient and family to use good hand hygiene technique  - Identify and instruct in appropriate isolation precautions for identified infection/condition  Outcome: Progressing     Problem: SAFETY ADULT  Goal: Patient will remain free of falls  Description: INTERVENTIONS:  - Assess patient frequently for physical needs  -  Identify cognitive and physical deficits and behaviors that affect risk of falls    -  [place] fall precautions as indicated by assessment   - Educate patient/family on patient safety including physical limitations  - Instruct patient to call for assistance with activity based on assessment  - Modify environment to reduce risk of injury  - Consider OT/PT consult to assist with strengthening/mobility  Outcome: Progressing  Goal: Maintain or return to baseline ADL function  Description: INTERVENTIONS:  -  Assess patient's ability to carry out ADLs; assess patient's baseline for ADL function and identify physical deficits which impact ability to perform ADLs (bathing, care of mouth/teeth, toileting, grooming, dressing, etc )  - Assess/evaluate cause of self-care deficits   - Assess range of motion  - Assess patient's mobility; develop plan if impaired  - Assess patient's need for assistive devices and provide as appropriate  - Encourage maximum independence but intervene and supervise when necessary  - Involve family in performance of ADLs  - Assess for home care needs following discharge   - Consider OT consult to assist with ADL evaluation and planning for discharge  - Provide patient education as appropriate  Outcome: Progressing  Goal: Maintain or return mobility status to optimal level  Description: INTERVENTIONS:  - Assess patient's baseline mobility status (ambulation, transfers, stairs, etc )    - Identify cognitive and physical deficits and behaviors that affect mobility  - Identify mobility aids required to assist with transfers and/or ambulation (gait belt, sit-to-stand, lift, walker, cane, etc )  - [place] fall precautions as indicated by assessment  - Record patient progress and toleration of activity level on Mobility SBAR; progress patient to next Phase/Stage  - Instruct patient to call for assistance with activity based on assessment  - Consider rehabilitation consult to assist with strengthening/weightbearing, etc   Outcome: Progressing     Problem: DISCHARGE PLANNING  Goal: Discharge to home or other facility with appropriate resources  Description: INTERVENTIONS:  - Identify barriers to discharge w/patient and caregiver  - Arrange for needed discharge resources and transportation as appropriate  - Identify discharge learning needs (meds, wound care, etc )  - Arrange for interpretive services to assist at discharge as needed  - Refer to Case Management Department for coordinating discharge planning if the patient needs post-hospital services based on physician/advanced practitioner order or complex needs related to functional status, cognitive ability, or social support system  Outcome: Progressing     Problem: Knowledge Deficit  Goal: Patient/family/caregiver demonstrates understanding of disease process, treatment plan, medications, and discharge instructions  Description: Complete learning assessment and assess knowledge base    Interventions:  - Provide teaching at level of understanding  - Provide teaching via preferred learning methods  Outcome: Progressing

## 2021-02-09 NOTE — BRIEF OP NOTE (RAD/CATH)
INTERVENTIONAL RADIOLOGY PROCEDURE NOTE    Date: 2/9/2021    Procedure: IR PERITONEAL DIALYSIS CATHETER REMOVAL AND PLACEMENT NEW SITE     Preoperative diagnosis:   1  Chronic kidney disease-mineral and bone disorder    2  Dyspnea    3  AFSHIN (acute kidney injury) ([facility] [address] )    4  Lower extremity edema    5  Acute diastolic CHF (congestive heart failure) ([facility])    6  Stage 4 chronic kidney disease ([facility] [address] )    7  Acute on chronic diastolic (congestive) heart failure (HCC)         Postoperative diagnosis: Same  Surgeon: Diego Van MD      Assistant: None  No qualified resident was available  Blood loss: minimal    Specimens: none    Findings: Successful removal of the existing Peritoneal catheter and placement of a new one into the posterior inferior pelvis  May be used immediately for urgent start PD  Complications: None immediate      Anesthesia: MAC sedation

## 2021-02-09 NOTE — ASSESSMENT & PLAN NOTE
Rate controlled on Lopressor    · Anticoagulated on Coumadin typically  · Discussed with IR, patient is tentatively scheduled for peritoneal dialysis catheter change today  · Held warfarin on 2/8 (last dose 2/7 PM) and bridging with heparin gtt  · Monitor INR

## 2021-02-10 LAB
INR PPP: 1.71 (ref 0.84–1.19)
PROTHROMBIN TIME: 20 SECONDS (ref 11.6–14.5)

## 2021-02-10 PROCEDURE — 99232 SBSQ HOSP IP/OBS MODERATE 35: CPT | Performed by: PHYSICIAN ASSISTANT

## 2021-02-10 PROCEDURE — 99232 SBSQ HOSP IP/OBS MODERATE 35: CPT | Performed by: INTERNAL MEDICINE

## 2021-02-10 PROCEDURE — 99232 SBSQ HOSP IP/OBS MODERATE 35: CPT | Performed by: NURSE PRACTITIONER

## 2021-02-10 PROCEDURE — 85610 PROTHROMBIN TIME: CPT | Performed by: INTERNAL MEDICINE

## 2021-02-10 RX ORDER — WARFARIN SODIUM 5 MG/1
5 TABLET ORAL
Status: COMPLETED | OUTPATIENT
Start: 2021-02-10 | End: 2021-02-10

## 2021-02-10 RX ADMIN — SEVELAMER HYDROCHLORIDE 800 MG: 800 TABLET, FILM COATED PARENTERAL at 17:37

## 2021-02-10 RX ADMIN — SEVELAMER HYDROCHLORIDE 800 MG: 800 TABLET, FILM COATED PARENTERAL at 07:43

## 2021-02-10 RX ADMIN — BENZONATATE 100 MG: 100 CAPSULE ORAL at 23:01

## 2021-02-10 RX ADMIN — WARFARIN SODIUM 5 MG: 5 TABLET ORAL at 17:37

## 2021-02-10 RX ADMIN — GUAIFENESIN 600 MG: 600 TABLET, EXTENDED RELEASE ORAL at 08:09

## 2021-02-10 RX ADMIN — OXYCODONE HYDROCHLORIDE 10 MG: 10 TABLET ORAL at 20:01

## 2021-02-10 RX ADMIN — BUMETANIDE 4 MG: 1 TABLET ORAL at 17:37

## 2021-02-10 RX ADMIN — ATORVASTATIN CALCIUM 40 MG: 40 TABLET, FILM COATED ORAL at 08:09

## 2021-02-10 RX ADMIN — TRAZODONE HYDROCHLORIDE 25 MG: 50 TABLET ORAL at 21:16

## 2021-02-10 RX ADMIN — MELATONIN TAB 3 MG 6 MG: 3 TAB at 21:16

## 2021-02-10 RX ADMIN — BUMETANIDE 4 MG: 1 TABLET ORAL at 08:09

## 2021-02-10 RX ADMIN — SEVELAMER HYDROCHLORIDE 800 MG: 800 TABLET, FILM COATED PARENTERAL at 11:53

## 2021-02-10 RX ADMIN — GUAIFENESIN 600 MG: 600 TABLET, EXTENDED RELEASE ORAL at 20:01

## 2021-02-10 NOTE — ASSESSMENT & PLAN NOTE
· Also with 2 second pause noted earlier in hospital stay  · Lopressor held  · Asymptomatic    · Appreciate cardiology recommendations, possible Zio patch as outpatient

## 2021-02-10 NOTE — ASSESSMENT & PLAN NOTE
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

## 2021-02-10 NOTE — ASSESSMENT & PLAN NOTE
· Chronic; history of HTN  · Blood pressures have been labile; /60-70s  · Norvasc and Lopressor on hold  · Monitor vital signs

## 2021-02-10 NOTE — ASSESSMENT & PLAN NOTE
· Rate controlled on Lopressor  · Anticoagulated on Coumadin typically  · Restart Coumadin tonight, will give 5 mg  Patient takes 2 mg daily at home  · Check INR tomorrow

## 2021-02-10 NOTE — ASSESSMENT & PLAN NOTE
Lab Results   Component Value Date    EGFR 16 02/09/2021    EGFR 13 02/06/2021    EGFR 11 02/05/2021    CREATININE 3 54 (H) 02/09/2021    CREATININE 4 19 (H) 02/06/2021    CREATININE 4 77 (H) 02/05/2021     · Baseline creatinine 2-2 9 approximately  4 19 on presentation, now ESRD  · Holding norvasc 2/2 soft BP  · Known to Dr Srinivas Hewitt as outpatient-was on HD in past  · Peritoneal dialysis per nephrology   · Will need renal clearance for discharge   · PD starting today

## 2021-02-10 NOTE — PROGRESS NOTES
NEPHROLOGY PROGRESS NOTE    Oliver Sharpe 68 y o  male MRN: [id number]  Unit/Bed#: [facility] 806-01 Encounter: [id number]  Reason for Consult:  End stage renal disease    The patient underwent PD catheter placement yesterday and states that he had no problems overnight no significant pain is feeling well  He is awake and alert and offers no complaints for me  ASSESSMENT/PLAN:  1  Renal    The patient has advanced chronic kidney disease and has progressed to developing symptoms and it was felt that he would initiate dialysis  He wanted to do peritoneal dialysis so initially had a PD catheter placed and was not draining well so had a new 1 placed yesterday  We will initiate his urgent start PD on the cycler  Will start with plan for 1500 cc feels of dextrose 1 5% and will do cycler during the day for 8 hours then rest overnight  I have spoken to the outpatient dialysis nurse who will arrange training when he is discharged as he will need to be trained from the hospital and he does have support who I provided the phone number to the PD nurse as well  SUBJECTIVE:  Review of Systems   Constitution: Negative for chills, fever, malaise/fatigue and night sweats  HENT: Negative  Eyes: Negative  Cardiovascular: Negative  Negative for chest pain, dyspnea on exertion, leg swelling and orthopnea  Respiratory: Negative  Negative for cough, shortness of breath, sputum production and wheezing  Gastrointestinal: Negative for abdominal pain, diarrhea, nausea and vomiting  New PD catheter in  Genitourinary: Negative for dysuria, flank pain, hematuria and incomplete emptying  Neurological: Negative for dizziness, focal weakness, headaches and weakness  Psychiatric/Behavioral: Negative for altered mental status, depression, hallucinations and hypervigilance         OBJECTIVE:  Current Weight: Weight - Scale: 76 1 kg (167 lb 12 8 oz)  Vitals:Temp (24hrs), Av 1 °F (36 7 °C), Min:97 2 °F (36 2 °C), Max:98 6 °F (37 °C)  Current: Temperature: 97 7 °F (36 5 °C)   Blood pressure 128/78, pulse 105, temperature 97 7 °F (36 5 °C), resp  rate 16, height 5' 7" (1 702 m), weight 76 1 kg (167 lb 12 8 oz), SpO2 98 %  , Body mass index is 26 28 kg/m²  Intake/Output Summary (Last 24 hours) at 2/10/2021 1008  Last data filed at 2/10/2021 0855  Gross per 24 hour   Intake 1100 ml   Output 1710 ml   Net -610 ml       Physical Exam: /78   Pulse 105   Temp 97 7 °F (36 5 °C)   Resp 16   Ht 5' 7" (1 702 m)   Wt 76 1 kg (167 lb 12 8 oz)   SpO2 98%   BMI 26 28 kg/m²   Physical Exam  Constitutional:       General: He is not in acute distress  Appearance: He is not ill-appearing or diaphoretic  HENT:      Head: Normocephalic and atraumatic  Neck:      Musculoskeletal: Neck supple  Vascular: No JVD  Cardiovascular:      Rate and Rhythm: Normal rate and regular rhythm  Heart sounds: No friction rub  Pulmonary:      Effort: Pulmonary effort is normal  No tachypnea  Breath sounds: Normal breath sounds  No decreased breath sounds, wheezing, rhonchi or rales  Abdominal:      General: Bowel sounds are normal       Palpations: Abdomen is soft  Tenderness: There is no abdominal tenderness  There is no rebound  Musculoskeletal:      Right lower leg: No edema  Left lower leg: No edema  Neurological:      General: No focal deficit present  Mental Status: He is oriented to person, place, and time  Psychiatric:         Mood and Affect: Mood normal  Mood is not anxious           Behavior: Behavior normal          Medications:    Current Facility-Administered Medications:     acetaminophen (TYLENOL) tablet 650 mg, 650 mg, Oral, Q6H PRN, EL Roth    atorvastatin (LIPITOR) tablet 40 mg, 40 mg, Oral, Daily, EL Roth, 40 mg at 02/10/21 0809    benzonatate (TESSALON PERLES) capsule 100 mg, 100 mg, Oral, TID PRN, Brittany Sears PA-C, 100 mg at 02/09/21 2101   bumetanide (BUMEX) tablet 4 mg, 4 mg, Oral, BID, Jet Andre, DO, 4 mg at 02/10/21 0809    guaiFENesin (MUCINEX) 12 hr tablet 600 mg, 600 mg, Oral, Q12H [address], [place] D TRINY Santos, 600 mg at 02/10/21 0809    lidocaine 1% buffered, , Infiltration, Code/Trauma/Sedation Med, Cecilia Galicia MD, 20 mL at 02/09/21 1402    melatonin tablet 6 mg, 6 mg, Oral, HS, EL Roth, 6 mg at 02/09/21 2103    ondansetron (ZOFRAN) injection 4 mg, 4 mg, Intravenous, Q6H PRN, EL Roth    oxyCODONE (ROXICODONE) immediate release tablet 10 mg, 10 mg, Oral, Q4H PRN, Cecilia Galicia MD, 10 mg at 02/04/21 2325    oxyCODONE (ROXICODONE) IR tablet 5 mg, 5 mg, Oral, Q4H PRN, Cecilia Galicia MD    polyethylene glycol (MIRALAX) packet 17 g, 17 g, Oral, Daily PRN, Aida Cullen DO    sevelamer (RENAGEL) tablet 800 mg, 800 mg, Oral, TID With Meals, Steve Torres DO, 800 mg at 02/10/21 0743    Insert peripheral IV, , , Once **AND** sodium chloride (PF) 0 9 % injection 3 mL, 3 mL, Intravenous, Q1H PRN, EL Roth    traZODone (DESYREL) tablet 25 mg, 25 mg, Oral, HS, EL Roth, 25 mg at 02/09/21 2101    warfarin (COUMADIN) tablet 5 mg, 5 mg, Oral, Once (warfarin), Ethelda Holter, CRNP    Laboratory Results:  Lab Results   Component Value Date    WBC 5 86 02/05/2021    HGB 8 2 (L) 02/05/2021    HCT 25 9 (L) 02/05/2021    MCV 97 02/05/2021    PLT 81 (L) 02/05/2021     Lab Results   Component Value Date    SODIUM 142 02/09/2021    K 4 1 02/09/2021     02/09/2021    CO2 30 02/09/2021    BUN 91 (H) 02/09/2021    CREATININE 3 54 (H) 02/09/2021    GLUC 92 02/09/2021    CALCIUM 9 2 02/09/2021     Lab Results   Component Value Date    CALCIUM 9 2 02/09/2021    PHOS 5 4 (H) 01/30/2021     No results found for: LABPROT

## 2021-02-10 NOTE — PROGRESS NOTES
Progress Note - Oliver Sharpe 1940, 68 y o  male MRN: [id number]    Unit/Bed#: [facility] 806-01 Encounter: [id number]    Primary Care Provider: EL Bullard   Date and time admitted to hospital: 1/26/2021  1:24 PM    * Acute on chronic diastolic (congestive) heart failure ([facility] [address] )  Assessment & Plan  · Presented with 15 lb weight gain in one month, GONZALES, worsening lower extremity edema  Outpatient cardiology team ordered extra dose of diuretic however no improvement  Patient was noted to have AFSHIN on outpatient labs and referred to ED  · Outpatient diuretic regimen bumex 4 mg PO BID with k 20 BID and metolazone 5 mg  2 x weekly  · Heart failure following, was on Bumex drip for several days and now transitioned to PO Bumex  · Last echo 10/2020 showed preserved EF  Mild MR  Severe TR    · I/O, daily weights, low NA diet with FR  · Patient had PD cath placed 2/3; had that initial cath removed and a new placement 2/9  · PD today  ESRD (end stage renal disease) [facility])  Assessment & Plan  Lab Results   Component Value Date    EGFR 16 02/09/2021    EGFR 13 02/06/2021    EGFR 11 02/05/2021    CREATININE 3 54 (H) 02/09/2021    CREATININE 4 19 (H) 02/06/2021    CREATININE 4 77 (H) 02/05/2021     · Baseline creatinine 2-2 9 approximately  4 19 on presentation, now ESRD  · Holding norvasc 2/2 soft BP  · Known to Dr Fe Dacosta as outpatient-was on HD in past  · Peritoneal dialysis per nephrology   · Will need renal clearance for discharge   · PD starting today  Insomnia  Assessment & Plan  · Monitor symptoms   · Continue melatonin  · Continue Trazodone HS    Atrial fibrillation (HCC)  Assessment & Plan  · Rate controlled on Lopressor  · Anticoagulated on Coumadin typically  · Restart Coumadin tonight, will give 5 mg  Patient takes 2 mg daily at home  · Check INR tomorrow      Anemia  Assessment & Plan  · Multifactorial in setting of CKD  · Check CBC in am     Hyperlipidemia  Assessment & Plan  · Continue statin    Dissection of thoracic aorta [facility])  Assessment & Plan  · 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  · 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  Risk associated with surgical options therefore recommended continued observation  Bradycardia  Assessment & Plan  · Also with 2 second pause noted earlier in hospital stay  · Lopressor held  · Asymptomatic  · Appreciate cardiology recommendations, possible Zio patch as outpatient     Benign essential hypertension  Assessment & Plan  · Chronic; history of HTN  · Blood pressures have been labile; /60-70s  · Norvasc and Lopressor on hold  · Monitor vital signs  VTE Pharmacologic Prophylaxis:   Pharmacologic: Warfarin (Coumadin)  Mechanical VTE Prophylaxis in Place: Yes    Patient Centered Rounds: I have performed bedside rounds with nursing staff today  Discussions with Specialists or Other Care Team Provider: IR Note reviewed, Nephrology note reviewed, Case Management, Primary RN    Education and Discussions with Family / Patient: Patient; daughter updated via phone  Time Spent for Care: 20 minutes  More than 50% of total time spent on counseling and coordination of care as described above  Current Length of Stay: 15 day(s)    Current Patient Status: Inpatient   Certification Statement: The patient will continue to require additional inpatient hospital stay due to ongoing treatment in setting of ESRD; New PD Cath, PD today  Discharge Plan: Not medically clear  Code Status: Level 1 - Full Code      Subjective:   Patient resting in bed; denies complaints of chest pain, shortness of breath, fever, or chills      Objective:     Vitals:   Temp (24hrs), Av 1 °F (36 7 °C), Min:97 2 °F (36 2 °C), Max:98 6 °F (37 °C)    Temp:  [97 2 °F (36 2 °C)-98 6 °F (37 °C)] 97 7 °F (36 5 °C)  HR:  [] 105  Resp:  [12-20] 16  BP: ()/(50-78) 128/78  SpO2:  [92 %-100 %] 98 %  Body mass index is 26 28 kg/m²  Input and Output Summary (last 24 hours): Intake/Output Summary (Last 24 hours) at 2/10/2021 1114  Last data filed at 2/10/2021 0855  Gross per 24 hour   Intake 1100 ml   Output 1710 ml   Net -610 ml       Physical Exam:     Physical Exam  Constitutional:       Appearance: He is obese  He is ill-appearing  Cardiovascular:      Rate and Rhythm: Normal rate  Pulses: Normal pulses  Heart sounds: Normal heart sounds  Pulmonary:      Effort: Pulmonary effort is normal       Breath sounds: Normal breath sounds  Abdominal:      General: Bowel sounds are normal  There is no distension  Palpations: Abdomen is soft  Tenderness: There is no abdominal tenderness  Musculoskeletal: Normal range of motion  General: No swelling or tenderness  Right lower leg: No edema  Left lower leg: No edema  Skin:     General: Skin is warm and dry  Capillary Refill: Capillary refill takes less than 2 seconds  Neurological:      Mental Status: He is alert  Mental status is at baseline  Psychiatric:         Mood and Affect: Mood normal        Additional Data:     Labs:    Results from last 7 days   Lab Units 02/05/21  0453   WBC Thousand/uL 5 86   HEMOGLOBIN g/dL 8 2*   HEMATOCRIT % 25 9*   PLATELETS Thousands/uL 81*   NEUTROS PCT % 65   LYMPHS PCT % 13*   MONOS PCT % 18*   EOS PCT % 4     Results from last 7 days   Lab Units 02/09/21  1744  02/05/21  0453   SODIUM mmol/L 142   < > 139   POTASSIUM mmol/L 4 1   < > 3 7   CHLORIDE mmol/L 107   < > 103   CO2 mmol/L 30   < > 30   BUN mg/dL 91*   < > 122*   CREATININE mg/dL 3 54*   < > 4 77*   ANION GAP mmol/L 5   < > 6   CALCIUM mg/dL 9 2   < > 8 7   ALBUMIN g/dL  --   --  2 7*   TOTAL BILIRUBIN mg/dL  --   --  0 79   ALK PHOS U/L  --   --  81   ALT U/L  --   --  15   AST U/L  --   --  26   GLUCOSE RANDOM mg/dL 92   < > 108    < > = values in this interval not displayed  Results from last 7 days   Lab Units 02/10/21  0529   INR  1 71*                   * I Have Reviewed All Lab Data Listed Above  * Additional Pertinent Lab Tests Reviewed: All Labs Within Last 24 Hours Reviewed    Imaging:    Imaging Reports Reviewed Today Include: No new imaging to review  Imaging Personally Reviewed by Myself Includes:  None    Recent Cultures (last 7 days):           Last 24 Hours Medication List:   Current Facility-Administered Medications   Medication Dose Route Frequency Provider Last Rate    acetaminophen  650 mg Oral Q6H PRN EL Smith      atorvastatin  40 mg Oral Daily EL Smith      benzonatate  100 mg Oral TID PRN Brittany Sears PA-C      bumetanide  4 mg Oral BID Michelle Hood DO      guaiFENesin  600 mg Oral Q12H [facility] Panchito Santos PA-C      lidocaine 1% buffered   Infiltration Code/Trauma/Sedation Med Jeanna Quiros MD      melatonin  6 mg Oral HS EL Roth      ondansetron  4 mg Intravenous Q6H PRN EL Smith      oxyCODONE  10 mg Oral Q4H PRN Jeanna Quiros MD      oxyCODONE  5 mg Oral Q4H PRN Jeanna Quiros MD      polyethylene glycol  17 g Oral Daily PRN Michelle Hood DO      sevelamer  800 mg Oral TID With Meals Aishwarya Torres DO      sodium chloride (PF)  3 mL Intravenous Q1H PRN EL Roth      traZODone  25 mg Oral HS EL Roth      warfarin  5 mg Oral Once (warfarin) EL Muhammad          Today, Patient Was Seen By: EL Muhammad    ** Please Note: Dictation voice to text software may have been used in the creation of this document   **

## 2021-02-10 NOTE — ASSESSMENT & PLAN NOTE
· Presented with 15 lb weight gain in one month, GONZALES, worsening lower extremity edema  Outpatient cardiology team ordered extra dose of diuretic however no improvement  Patient was noted to have AFSHIN on outpatient labs and referred to ED  · Outpatient diuretic regimen bumex 4 mg PO BID with k 20 BID and metolazone 5 mg  2 x weekly  · Heart failure following, was on Bumex drip for several days and now transitioned to PO Bumex  · Last echo 10/2020 showed preserved EF  Mild MR  Severe TR    · I/O, daily weights, low NA diet with FR  · Patient had PD cath placed 2/3; had that initial cath removed and a new placement 2/9  · PD today

## 2021-02-11 LAB
ANION GAP SERPL CALCULATED.3IONS-SCNC: 4 MMOL/L (ref 4–13)
BUN SERPL-MCNC: 97 MG/DL (ref 5–25)
CALCIUM SERPL-MCNC: 8.6 MG/DL (ref 8.3–10.1)
CHLORIDE SERPL-SCNC: 103 MMOL/L (ref 100–108)
CO2 SERPL-SCNC: 33 MMOL/L (ref 21–32)
CREAT SERPL-MCNC: 3.76 MG/DL (ref 0.6–1.3)
ERYTHROCYTE [DISTWIDTH] IN BLOOD BY AUTOMATED COUNT: 15.9 % (ref 11.6–15.1)
GFR SERPL CREATININE-BSD FRML MDRD: 14 ML/MIN/1.73SQ M
GLUCOSE SERPL-MCNC: 117 MG/DL (ref 65–140)
HCT VFR BLD AUTO: 27.8 % (ref 36.5–49.3)
HGB BLD-MCNC: 8.6 G/DL (ref 12–17)
MCH RBC QN AUTO: 30.7 PG (ref 26.8–34.3)
MCHC RBC AUTO-ENTMCNC: 30.9 G/DL (ref 31.4–37.4)
MCV RBC AUTO: 99 FL (ref 82–98)
PLATELET # BLD AUTO: 100 THOUSANDS/UL (ref 149–390)
PMV BLD AUTO: 10.3 FL (ref 8.9–12.7)
POTASSIUM SERPL-SCNC: 4.2 MMOL/L (ref 3.5–5.3)
RBC # BLD AUTO: 2.8 MILLION/UL (ref 3.88–5.62)
SODIUM SERPL-SCNC: 140 MMOL/L (ref 136–145)
WBC # BLD AUTO: 8.74 THOUSAND/UL (ref 4.31–10.16)

## 2021-02-11 PROCEDURE — 80048 BASIC METABOLIC PNL TOTAL CA: CPT | Performed by: NURSE PRACTITIONER

## 2021-02-11 PROCEDURE — 85027 COMPLETE CBC AUTOMATED: CPT | Performed by: NURSE PRACTITIONER

## 2021-02-11 PROCEDURE — 99232 SBSQ HOSP IP/OBS MODERATE 35: CPT | Performed by: INTERNAL MEDICINE

## 2021-02-11 PROCEDURE — 99233 SBSQ HOSP IP/OBS HIGH 50: CPT | Performed by: INTERNAL MEDICINE

## 2021-02-11 RX ADMIN — WARFARIN SODIUM 3 MG: 2 TABLET ORAL at 17:15

## 2021-02-11 RX ADMIN — GUAIFENESIN 600 MG: 600 TABLET, EXTENDED RELEASE ORAL at 07:39

## 2021-02-11 RX ADMIN — MELATONIN TAB 3 MG 6 MG: 3 TAB at 21:11

## 2021-02-11 RX ADMIN — SEVELAMER HYDROCHLORIDE 800 MG: 800 TABLET, FILM COATED PARENTERAL at 11:43

## 2021-02-11 RX ADMIN — ATORVASTATIN CALCIUM 40 MG: 40 TABLET, FILM COATED ORAL at 07:40

## 2021-02-11 RX ADMIN — GUAIFENESIN 600 MG: 600 TABLET, EXTENDED RELEASE ORAL at 21:11

## 2021-02-11 RX ADMIN — SEVELAMER HYDROCHLORIDE 800 MG: 800 TABLET, FILM COATED PARENTERAL at 07:39

## 2021-02-11 RX ADMIN — TRAZODONE HYDROCHLORIDE 25 MG: 50 TABLET ORAL at 21:11

## 2021-02-11 RX ADMIN — OXYCODONE HYDROCHLORIDE 10 MG: 10 TABLET ORAL at 22:07

## 2021-02-11 RX ADMIN — BUMETANIDE 4 MG: 1 TABLET ORAL at 07:40

## 2021-02-11 RX ADMIN — BUMETANIDE 4 MG: 1 TABLET ORAL at 17:18

## 2021-02-11 RX ADMIN — SEVELAMER HYDROCHLORIDE 800 MG: 800 TABLET, FILM COATED PARENTERAL at 17:15

## 2021-02-11 NOTE — ASSESSMENT & PLAN NOTE
Lab Results   Component Value Date    EGFR 14 02/11/2021    EGFR 16 02/09/2021    EGFR 13 02/06/2021    CREATININE 3 76 (H) 02/11/2021    CREATININE 3 54 (H) 02/09/2021    CREATININE 4 19 (H) 02/06/2021     · Baseline creatinine 2-2 9 approximately    4 19 on presentation, now ESRD  · Holding norvasc 2/2 soft BP  · Known to Dr Devora Nixon as outpatient-was on HD in past  · Peritoneal dialysis per nephrology   · Started on PD

## 2021-02-11 NOTE — NURSING NOTE
Pt  Continued to alarm many times for low drain volume throughout first drain  Able to get patient to drain of 719ml out  Bypass to fill, as pt  Had drained 70% of fill volume

## 2021-02-11 NOTE — PROGRESS NOTES
NEPHROLOGY PROGRESS NOTE    Oliver Sharpe 68 y o  male MRN: [id number]  Unit/Bed#: [facility] 806-01 Encounter: [id number]  Reason for Consult:  Renal failure    The patient was awake and alert today states that he tolerated the cycler peritoneal dialysis without any discomfort  He had no complaints for me today but is asking when he will be able to go home  ASSESSMENT/PLAN:  1  Renal    Patient has chronic kidney disease followed by 1 of my partners and it was deemed that he was uremic and he was admitted to undergo initiation of peritoneal dialysis  Patient apparently had pre dialysis modality training and shows peritoneal dialysis  He was scheduled undergo elective catheter placement was admitted and has a catheter placed as to urgent start PD  Arrangements are being made with [address] [place] [address] unit and I have communicated with the nurse directly regarding training likely to start next week  When I look at the nursing notes there were low drain alarms but the patient was apparently only getting 1000 cc feels as they were having trouble programming the original order  I am going to make adjustments in the PD prescription tonight  Electrolytes are acceptable  Will do fill volume 2000 cc total volume 8000 cc for 8 hours on nocturnal cycler of dextrose 1 5%    SUBJECTIVE:  Review of Systems   Constitution: Negative for chills, fever, malaise/fatigue and night sweats  HENT: Negative  Eyes: Negative  Cardiovascular: Negative  Negative for chest pain, dyspnea on exertion, leg swelling and orthopnea  Respiratory: Negative  Negative for cough, shortness of breath, sputum production and wheezing  Gastrointestinal: Negative for bloating, abdominal pain, diarrhea, nausea and vomiting  Genitourinary: Negative for dysuria, flank pain, hematuria and incomplete emptying  Neurological: Negative for dizziness, focal weakness, headaches and weakness     Psychiatric/Behavioral: Negative for altered mental status, depression, hallucinations and hypervigilance  OBJECTIVE:  Current Weight: Weight - Scale: 75 5 kg (166 lb 7 2 oz)  Vitals:Temp (24hrs), Av 9 °F (36 6 °C), Min:97 8 °F (36 6 °C), Max:97 9 °F (36 6 °C)  Current: Temperature: 97 9 °F (36 6 °C)   Blood pressure 108/64, pulse 66, temperature 97 9 °F (36 6 °C), resp  rate 18, height 5' 7" (1 702 m), weight 75 5 kg (166 lb 7 2 oz), SpO2 98 %  , Body mass index is 26 07 kg/m²  Intake/Output Summary (Last 24 hours) at 2021 1016  Last data filed at 2021 0901  Gross per 24 hour   Intake 360 ml   Output 1450 ml   Net -1090 ml       Physical Exam: /64   Pulse 66   Temp 97 9 °F (36 6 °C)   Resp 18   Ht 5' 7" (1 702 m)   Wt 75 5 kg (166 lb 7 2 oz)   SpO2 98%   BMI 26 07 kg/m²   Physical Exam  Constitutional:       General: He is not in acute distress  Appearance: He is not ill-appearing or diaphoretic  HENT:      Head: Normocephalic and atraumatic  Eyes:      Extraocular Movements: Extraocular movements intact  Neck:      Musculoskeletal: Neck supple  Vascular: No JVD  Cardiovascular:      Rate and Rhythm: Normal rate and regular rhythm  Heart sounds: No friction rub  No gallop  Comments: No significant edema  Pulmonary:      Effort: Pulmonary effort is normal  No tachypnea  Breath sounds: Normal breath sounds  No decreased breath sounds, wheezing, rhonchi or rales  Abdominal:      General: Bowel sounds are normal       Palpations: Abdomen is soft  Tenderness: There is no abdominal tenderness  There is no rebound  Neurological:      General: No focal deficit present  Mental Status: He is alert and oriented to person, place, and time  Psychiatric:         Mood and Affect: Mood normal  Mood is not anxious  Behavior: Behavior is not agitated           Medications:    Current Facility-Administered Medications:     acetaminophen (TYLENOL) tablet 650 mg, 650 mg, Oral, Q6H PRN, Crispin Mar, EL    atorvastatin (LIPITOR) tablet 40 mg, 40 mg, Oral, Daily, EL Roth, 40 mg at 02/11/21 0740    benzonatate (TESSALON PERLES) capsule 100 mg, 100 mg, Oral, TID PRN, Brittany Sears PA-C, 100 mg at 02/10/21 2301    bumetanide (BUMEX) tablet 4 mg, 4 mg, Oral, BID, Jet Andre, DO, 4 mg at 02/11/21 0740    guaiFENesin (MUCINEX) 12 hr tablet 600 mg, 600 mg, Oral, Q12H [address], [place] D TRINY Santos, 600 mg at 02/11/21 0739    lidocaine 1% buffered, , Infiltration, Code/Trauma/Sedation Med, Satish Bustillos MD, 20 mL at 02/09/21 1402    melatonin tablet 6 mg, 6 mg, Oral, HS, EL Roth, 6 mg at 02/10/21 2116    ondansetron (ZOFRAN) injection 4 mg, 4 mg, Intravenous, Q6H PRN, EL Roth    oxyCODONE (ROXICODONE) immediate release tablet 10 mg, 10 mg, Oral, Q4H PRN, Satish Bustillos MD, 10 mg at 02/10/21 2001    oxyCODONE (ROXICODONE) IR tablet 5 mg, 5 mg, Oral, Q4H PRN, Satish Bustillos MD    polyethylene glycol (MIRALAX) packet 17 g, 17 g, Oral, Daily PRN, Radha Mcadams DO    sevelamer (RENAGEL) tablet 800 mg, 800 mg, Oral, TID With Meals, Allyn Torres DO, 800 mg at 02/11/21 0739    Insert peripheral IV, , , Once **AND** sodium chloride (PF) 0 9 % injection 3 mL, 3 mL, Intravenous, Q1H PRN, EL Roth    traZODone (DESYREL) tablet 25 mg, 25 mg, Oral, HS, EL Roth, 25 mg at 02/10/21 2116    Laboratory Results:  Lab Results   Component Value Date    WBC 8 74 02/11/2021    HGB 8 6 (L) 02/11/2021    HCT 27 8 (L) 02/11/2021    MCV 99 (H) 02/11/2021     (L) 02/11/2021     Lab Results   Component Value Date    SODIUM 140 02/11/2021    K 4 2 02/11/2021     02/11/2021    CO2 33 (H) 02/11/2021    BUN 97 (H) 02/11/2021    CREATININE 3 76 (H) 02/11/2021    GLUC 117 02/11/2021    CALCIUM 8 6 02/11/2021     Lab Results   Component Value Date    CALCIUM 8 6 02/11/2021    PHOS 5 4 (H) 01/30/2021     No results found for: LABPROT

## 2021-02-11 NOTE — CASE MANAGEMENT
ANTONIO spoke with pt's daughter, Ulises Morrow [phone]  Leatha Kemp reports she has a friend that can provide PD support  Leatha Kemp requested to speak with Nephrologist  ANTONIO sent message to Dr Liam Jeff via [employer] Text to reach out to daughter  CM to follow

## 2021-02-11 NOTE — PROGRESS NOTES
Heart Failure/ Pulmonary Hypertension Progress Note - Oliver Sharpe 68 y o  male MRN: [id number]    [facility] Encounter: [id number]      Assessment:    Principal Problem:    Acute on chronic diastolic (congestive) heart failure (HCC)  Active Problems:    Benign essential hypertension    Bradycardia    Dissection of thoracic aorta (HCC)    Hyperlipidemia    Anemia    Atrial fibrillation (HCC)    Insomnia    ESRD (end stage renal disease) (HCC)      Subjective:   Oliver Sharpe is a 68year-old man with PMH as below who presented to [facility] 01/26/2021 after being contacted by cardiology office to review abnormal renal function  Patient had increased frequency of metolazone dosing and had minimal improvement with no reported weight loss or symptomatic improvement  Case was discussed with Dr Philly Armas and decision was made to have patient proceed to ED for further evaluation  Patient seen and examined  PD treatments started  Bradycardia improved with holding BB  Already transitioned to oral diuretic by nephro  Objective: Intake/ Output: 780/1150/-370  Weight: bed scale 166 lbs      Acute on chronic HFpEF - Volume management: Bumex 4mg PO BID  S/P PD catheter placement, scheduled for adjustment due to malfunction today   BP controlled at this point off antihypertensives  Continue to monitor  May need to restart Amlodipine as outpatient    Currently enrolled in [facility] program                      TTE 04/24/2019: LVEF 60%  LVIDd 4 31 cm  IVSd 1 4 cm  Grade 1 DD  Normal RV size and RVSF  Mild TR  PASP 35 mmHg                TTE 10/06/2020: LVEF 60%  LVIDd 4 69 cm  IVSd 1 41 cm  Grade 2 DD  Normal RV size and RVSF  KUNAL  Mild MR  Severe TR                Paroxysmal atrial [id number] = 4 (age, CHF, HTN)  Anticoagulation on Coumadin  Persistently bradycardic this admit into the mid 30's  Trial off Metop for now and monitor  Bradycardia   With rates in the 30-40s seen on telemetry this admit  Improved  Consider further monitoring as outpatient, ie Zio patch or HM to further evaluate   BB d/c for now  Continue to monitor response as outpatient        Hypertension  Well controlled  Amlodipine 5 mg daily currently on hold for soft BP        Hyperlipidemia  FLP 1/26/21: TC 88, TD 69, HDL 42, LDL 32  Continue on atorvastatin 40 mg daily       Chronic kidney disease, stage IV Baseline creatinine was 2 0-2 3, new baseline likely 3 5-4  S/P PD cath placement on 2/3/21     Follows with Dr Daya Lovett as outpatient  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 with hemiarch reconstruction and aortic valve resuspension with a 26 mm Dacron graft on 12/15/2019 by Dr Jarrett Perez               Benign prostatic hyperplasia  History of COVID-19 infection: diagnosed in 07/2020  Review of Systems   All other systems reviewed and are negative  [employer] (day, reason): Hills catheter (day, reason):    Vitals: Blood pressure 108/64, pulse 66, temperature 97 9 °F (36 6 °C), resp  rate 18, height 5' 7" (1 702 m), weight 75 5 kg (166 lb 7 2 oz), SpO2 98 %  , Body mass index is 26 07 kg/m² , I/O last 3 completed shifts: In: 780 [P O :780]  Out: 2350 [Urine:2350]  I/O this shift:  In: 360 [P O :360]  Out: 750 [Urine:750]  Wt Readings from Last 3 Encounters:   02/11/21 75 5 kg (166 lb 7 2 oz)   01/19/21 80 3 kg (177 lb)   01/11/21 78 9 kg (174 lb)       Intake/Output Summary (Last 24 hours) at 2/11/2021 1428  Last data filed at 2/11/2021 1428  Gross per 24 hour   Intake 360 ml   Output 1900 ml   Net -1540 ml     I/O last 3 completed shifts:   In: 780 [P O :780]  Out: 2350 [Urine:2350]          Physical Exam:  Vitals:    02/11/21 0400 02/11/21 0500 02/11/21 0536 02/11/21 0734   BP: 95/52 119/71  108/64   Pulse: 63 61  66   Resp:    18   Temp:       TempSrc:       SpO2: 94% 95%  98%   Weight:   75 5 kg (166 lb 7 2 oz)    Height:           GEN: Oliver SEBASTIAN Core appears well, alert and oriented x 3, pleasant and cooperative   HEENT: pupils equal, round, and reactive to light; extraocular muscles intact  NECK: supple, no carotid bruits   HEART: regular rhythm, normal S1 and S2, no murmurs, clicks, gallops or rubs, JVP is flat  LUNGS: diminished  to auscultation bilaterally; no  Wheezes, no rales, no rhonchi, few bibasilar crackles     ABDOMEN: normal bowel sounds, soft, no tenderness, no distention  EXTREMITIES: peripheral pulses normal; no clubbing, cyanosis, or edema  NEURO: no focal findings   SKIN: normal without suspicious lesions on exposed skin      Current Facility-Administered Medications:     acetaminophen (TYLENOL) tablet 650 mg, 650 mg, Oral, Q6H PRN, EL Roth    atorvastatin (LIPITOR) tablet 40 mg, 40 mg, Oral, Daily, EL Roth, 40 mg at 02/11/21 0740    benzonatate (TESSALON PERLES) capsule 100 mg, 100 mg, Oral, TID PRN, Brittany Sears PA-C, 100 mg at 02/10/21 2301    bumetanide (BUMEX) tablet 4 mg, 4 mg, Oral, BID, Jet Andre, DO, 4 mg at 02/11/21 0740    guaiFENesin (MUCINEX) 12 hr tablet 600 mg, 600 mg, Oral, Q12H [address], [place] D TRINY Santos, 600 mg at 02/11/21 0739    lidocaine 1% buffered, , Infiltration, Code/Trauma/Sedation Med, Toño Spivey MD, 20 mL at 02/09/21 1402    melatonin tablet 6 mg, 6 mg, Oral, HS, EL Roth, 6 mg at 02/10/21 2116    ondansetron (ZOFRAN) injection 4 mg, 4 mg, Intravenous, Q6H PRN, EL Roth    oxyCODONE (ROXICODONE) immediate release tablet 10 mg, 10 mg, Oral, Q4H PRN, Toño Spivey MD, 10 mg at 02/10/21 2001    oxyCODONE (ROXICODONE) IR tablet 5 mg, 5 mg, Oral, Q4H PRN, Toño Spivey MD    polyethylene glycol (MIRALAX) packet 17 g, 17 g, Oral, Daily PRN, Lissy Tovar DO    sevelamer (RENAGEL) tablet 800 mg, 800 mg, Oral, TID With Meals, Eliane Torres DO, 800 mg at 02/11/21 1143    Insert peripheral IV, , , Once **AND** sodium chloride (PF) 0 9 % injection 3 mL, 3 mL, Intravenous, Q1H PRN, EL Higuera    traZODone (DESYREL) tablet 25 mg, 25 mg, Oral, HS, Char DE ANDA EL Ryder, 25 mg at 02/10/21 2116    warfarin (COUMADIN) tablet 3 mg, 3 mg, Oral, Daily (warfarin), Jame Mejia DO      Labs & Results:        Results from last 7 days   Lab Units 02/11/21  0514 02/05/21  0453   WBC Thousand/uL 8 74 5 86   HEMOGLOBIN g/dL 8 6* 8 2*   HEMATOCRIT % 27 8* 25 9*   PLATELETS Thousands/uL 100* 81*         Results from last 7 days   Lab Units 02/11/21  0514 02/09/21  1744 02/06/21  0519 02/05/21  0453   POTASSIUM mmol/L 4 2 4 1 4 1 3 7   CHLORIDE mmol/L 103 107 107 103   CO2 mmol/L 33* 30 24 30   BUN mg/dL 97* 91* 108* 122*   CREATININE mg/dL 3 76* 3 54* 4 19* 4 77*   CALCIUM mg/dL 8 6 9 2 9 5 8 7   ALK PHOS U/L  --   --   --  81   ALT U/L  --   --   --  15   AST U/L  --   --   --  26     Results from last 7 days   Lab Units 02/10/21  0529 02/09/21  0418 02/08/21  1400   INR  1 71* 1 98* 1 90*         Counseling / Coordination of Care  Total floor / unit time spent today 20 minutes  Greater than 50% of total time was spent with the patient and / or family counseling and / or coordination of care  A description of the counseling / coordination of care: 20  Jenelle Jay

## 2021-02-11 NOTE — PROGRESS NOTES
Progress Note - Oliver Sharpe 1940, 68 y o  male MRN: [id number]    Unit/Bed#: [facility] 806-01 Encounter: [id number]    Primary Care Provider: EL Montaño   Date and time admitted to hospital: 1/26/2021  1:24 PM        * Acute on chronic diastolic (congestive) heart failure ([address] )  Assessment & Plan  · Presented with 15 lb weight gain in one month, GONZALES, worsening lower extremity edema  Outpatient cardiology team ordered extra dose of diuretic however no improvement  Patient was noted to have AFSHIN on outpatient labs and referred to ED  · Outpatient diuretic regimen bumex 4 mg PO BID with k 20 BID and metolazone 5 mg  2 x weekly  · Heart failure following, was on Bumex drip for several days and now transitioned to PO Bumex  · Last echo 10/2020 showed preserved EF  Mild MR  Severe TR    · I/O, daily weights, low NA diet with FR  · Patient had PD cath placed 2/3; had initial cath removed and a new placement 2/9  · Started on PD on 02/10    ESRD (end stage renal disease) [facility])  Assessment & Plan  Lab Results   Component Value Date    EGFR 14 02/11/2021    EGFR 16 02/09/2021    EGFR 13 02/06/2021    CREATININE 3 76 (H) 02/11/2021    CREATININE 3 54 (H) 02/09/2021    CREATININE 4 19 (H) 02/06/2021     · Baseline creatinine 2-2 9 approximately  4 19 on presentation, now ESRD  · Holding norvasc 2/2 soft BP  · Known to Dr Yissel Mo as outpatient-was on HD in past  · Peritoneal dialysis per nephrology   · Started on PD    Atrial fibrillation [facility])  Assessment & Plan  · Rate controlled on Lopressor  · Anticoagulated on Coumadin   · Patient takes 2 mg daily at home  · Monitor INR    Anemia  Assessment & Plan  · Multifactorial in setting of CKD  · Monitor    Dissection of thoracic aorta [facility])  Assessment & Plan  · 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  · 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    Risk associated with surgical options therefore recommended continued observation  Bradycardia  Assessment & Plan  · Also with 2 second pause noted earlier in hospital stay  · Lopressor held  · Asymptomatic  · Appreciate cardiology recommendations, possible Zio patch as outpatient     Benign essential hypertension  Assessment & Plan  · Chronic; history of HTN  · Blood pressures have been labile; /60-70s  · Norvasc and Lopressor on hold  · Monitor vital signs  VTE Pharmacologic Prophylaxis:   Pharmacologic: Warfarin (Coumadin)  Mechanical VTE Prophylaxis in Place: Yes    Patient Centered Rounds: I have performed bedside rounds with nursing staff today  Discussions with Specialists or Other Care Team Provider:     Education and Discussions with Family / Patient:  Patient    Time Spent for Care: 45 minutes  More than 50% of total time spent on counseling and coordination of care as described above  Current Length of Stay: 16 day(s)    Current Patient Status: Inpatient   Certification Statement: The patient will continue to require additional inpatient hospital stay due to Above    Discharge Plan / Estimated Discharge Date:  Patient does not want to go to rehab, wants to go home    Code Status: Level 1 - Full Code      Subjective:   Patient seen and examined  Comfortable in bed  No chest pain or shortness of breath  Started on PD yesterday    Objective:     Vitals:   Temp (24hrs), Av 9 °F (36 6 °C), Min:97 8 °F (36 6 °C), Max:97 9 °F (36 6 °C)    Temp:  [97 8 °F (36 6 °C)-97 9 °F (36 6 °C)] 97 9 °F (36 6 °C)  HR:  [] 66  Resp:  [18-19] 18  BP: ()/(47-73) 108/64  SpO2:  [90 %-98 %] 98 %  Body mass index is 26 07 kg/m²  Input and Output Summary (last 24 hours):        Intake/Output Summary (Last 24 hours) at 2021 1111  Last data filed at 2021 0901  Gross per 24 hour   Intake 540 ml   Output 1450 ml   Net -910 ml       Physical Exam:     Physical Exam  Patient is awake alert oriented no acute distress  Lung clear to auscultation bilateral  Heart positive D3-O7 with systolic murmur  Abdomen soft nontender  Lower extremities no edema    Additional Data:     Labs:    Results from last 7 days   Lab Units 02/11/21  0514 02/05/21  0453   WBC Thousand/uL 8 74 5 86   HEMOGLOBIN g/dL 8 6* 8 2*   HEMATOCRIT % 27 8* 25 9*   PLATELETS Thousands/uL 100* 81*   NEUTROS PCT %  --  65   LYMPHS PCT %  --  13*   MONOS PCT %  --  18*   EOS PCT %  --  4     Results from last 7 days   Lab Units 02/11/21  0514  02/05/21  0453   POTASSIUM mmol/L 4 2   < > 3 7   CHLORIDE mmol/L 103   < > 103   CO2 mmol/L 33*   < > 30   BUN mg/dL 97*   < > 122*   CREATININE mg/dL 3 76*   < > 4 77*   CALCIUM mg/dL 8 6   < > 8 7   ALK PHOS U/L  --   --  81   ALT U/L  --   --  15   AST U/L  --   --  26    < > = values in this interval not displayed  Results from last 7 days   Lab Units 02/10/21  0529   INR  1 71*       * I Have Reviewed All Lab Data Listed Above  * Additional Pertinent Lab Tests Reviewed:  Lucila Ybarra Admission Reviewed    Imaging:    Imaging Reports Reviewed Today Include:   Imaging Personally Reviewed by Myself Includes:     Recent Cultures (last 7 days):           Last 24 Hours Medication List:   Current Facility-Administered Medications   Medication Dose Route Frequency Provider Last Rate    acetaminophen  650 mg Oral Q6H PRN EL Bass      atorvastatin  40 mg Oral Daily EL Bass      benzonatate  100 mg Oral TID PRN Brittany Sears PA-C      bumetanide  4 mg Oral BID Solitario Roberts DO      guaiFENesin  600 mg Oral Q12H [address] Kathleenia CHIDI Santos PA-C      lidocaine 1% buffered   Infiltration Code/Trauma/Sedation Med Jad Wells MD      melatonin  6 mg Oral HS EL Roth      ondansetron  4 mg Intravenous Q6H PRN EL Garcia      oxyCODONE  10 mg Oral Q4H PRN Jad Wells MD      oxyCODONE  5 mg Oral Q4H PRN Jad Wells MD     [facility] polyethylene glycol  17 g Oral Daily PRN Sara Campos DO      sevelamer  800 mg Oral TID With Meals Jazmin Trores DO      sodium chloride (PF)  3 mL Intravenous Q1H PRN EL Roth      traZODone  25 mg Oral HS EL Roth      warfarin  3 mg Oral Daily (warfarin) Lars Bonilla DO          Today, Patient Was Seen By: Lars Bonilla DO    ** Please Note: This note has been constructed using a voice recognition system   **

## 2021-02-11 NOTE — ASSESSMENT & PLAN NOTE
· Rate controlled on Lopressor  · Anticoagulated on Coumadin   · Patient takes 2 mg daily at home    · Monitor INR

## 2021-02-11 NOTE — ASSESSMENT & PLAN NOTE
· Presented with 15 lb weight gain in one month, GONZALES, worsening lower extremity edema  Outpatient cardiology team ordered extra dose of diuretic however no improvement  Patient was noted to have AFSHIN on outpatient labs and referred to ED  · Outpatient diuretic regimen bumex 4 mg PO BID with k 20 BID and metolazone 5 mg  2 x weekly  · Heart failure following, was on Bumex drip for several days and now transitioned to PO Bumex  · Last echo 10/2020 showed preserved EF  Mild MR   Severe TR    · I/O, daily weights, low NA diet with FR  · Patient had PD cath placed 2/3; had initial cath removed and a new placement 2/9  · Started on PD on 02/10

## 2021-02-11 NOTE — NURSING NOTE
Cycler alarmed low drain volume for drain 4 and 5 multiple times, followed by a low UF alarm  Was able to press stop/go to drain enough for cycler to drain enough  Following drain 6 cycler alarmed for low UF  Performed last manual drain  Final UF was positive 191ml  Pt  Tolerated urgent PD well  Cycler complete at this time

## 2021-02-11 NOTE — NURSING NOTE
Cycler alarmed again on 3rd drain for low drain volume, starting at 332ml  Alarmed multiple times as cycler was jumping in only 2-4ml increments  Line checked with no noticeable pieces of fibrin  Open and closed catheter multiple times to break up any potential fibrin stuck in the end of the cathter  Once able to get drain volume above 900ml cycler began alarming for low UF  Pressed stop/go to keep cycler draining until machine drained an adequate volume to go to next fill  Pt  Continues to tolerate exchanges well

## 2021-02-11 NOTE — NURSING NOTE
Cycler alarming on second drain for low drain volume at 721ml  After multiple low drain alarms, pressing stop/go to continue drain, cycler was able to drain enough 855ml to go to next fill  Pt  Tolerating well  Slight pink tinge fluid noted in drain bag with no additional fibrin

## 2021-02-11 NOTE — NURSING NOTE
After multiple low drain volume alarms, patient has only drained 370ml of the 1000ml fill he received  There is a large piece of fibrin noted in the bag  Tiger text sent to Dr Darren Crenshaw with nephrology  Awaiting further instruction  Will continue to attempt to drain via cycler until discussed with physician

## 2021-02-12 LAB
FLUAV RNA RESP QL NAA+PROBE: NEGATIVE
FLUBV RNA RESP QL NAA+PROBE: NEGATIVE
INR PPP: 1.85 (ref 0.84–1.19)
PROTHROMBIN TIME: 21.3 SECONDS (ref 11.6–14.5)
RSV RNA RESP QL NAA+PROBE: NEGATIVE
SARS-COV-2 RNA RESP QL NAA+PROBE: NEGATIVE

## 2021-02-12 PROCEDURE — 99233 SBSQ HOSP IP/OBS HIGH 50: CPT | Performed by: INTERNAL MEDICINE

## 2021-02-12 PROCEDURE — 97166 OT EVAL MOD COMPLEX 45 MIN: CPT

## 2021-02-12 PROCEDURE — 85610 PROTHROMBIN TIME: CPT | Performed by: INTERNAL MEDICINE

## 2021-02-12 PROCEDURE — 97162 PT EVAL MOD COMPLEX 30 MIN: CPT

## 2021-02-12 PROCEDURE — 0241U HB NFCT DS VIR RESP RNA 4 TRGT: CPT | Performed by: INTERNAL MEDICINE

## 2021-02-12 PROCEDURE — 99232 SBSQ HOSP IP/OBS MODERATE 35: CPT | Performed by: INTERNAL MEDICINE

## 2021-02-12 PROCEDURE — 99233 SBSQ HOSP IP/OBS HIGH 50: CPT | Performed by: NURSE PRACTITIONER

## 2021-02-12 RX ORDER — POLYETHYLENE GLYCOL 3350 17 G/17G
17 POWDER, FOR SOLUTION ORAL DAILY
Status: DISCONTINUED | OUTPATIENT
Start: 2021-02-12 | End: 2021-02-17 | Stop reason: HOSPADM

## 2021-02-12 RX ORDER — HYDROMORPHONE HCL/PF 1 MG/ML
0.2 SYRINGE (ML) INJECTION EVERY 6 HOURS PRN
Status: DISCONTINUED | OUTPATIENT
Start: 2021-02-12 | End: 2021-02-17 | Stop reason: HOSPADM

## 2021-02-12 RX ORDER — AMOXICILLIN 250 MG
1 CAPSULE ORAL 2 TIMES DAILY
Status: DISCONTINUED | OUTPATIENT
Start: 2021-02-12 | End: 2021-02-17 | Stop reason: HOSPADM

## 2021-02-12 RX ADMIN — SEVELAMER HYDROCHLORIDE 800 MG: 800 TABLET, FILM COATED PARENTERAL at 12:09

## 2021-02-12 RX ADMIN — SENNOSIDES AND DOCUSATE SODIUM 1 TABLET: 8.6; 5 TABLET ORAL at 17:30

## 2021-02-12 RX ADMIN — GUAIFENESIN 600 MG: 600 TABLET, EXTENDED RELEASE ORAL at 08:05

## 2021-02-12 RX ADMIN — WARFARIN SODIUM 3 MG: 2 TABLET ORAL at 17:30

## 2021-02-12 RX ADMIN — POLYETHYLENE GLYCOL 3350 17 G: 17 POWDER, FOR SOLUTION ORAL at 12:09

## 2021-02-12 RX ADMIN — OXYCODONE HYDROCHLORIDE 10 MG: 10 TABLET ORAL at 17:31

## 2021-02-12 RX ADMIN — SEVELAMER HYDROCHLORIDE 800 MG: 800 TABLET, FILM COATED PARENTERAL at 17:30

## 2021-02-12 RX ADMIN — BUMETANIDE 4 MG: 1 TABLET ORAL at 17:30

## 2021-02-12 RX ADMIN — SENNOSIDES AND DOCUSATE SODIUM 1 TABLET: 8.6; 5 TABLET ORAL at 12:09

## 2021-02-12 RX ADMIN — SEVELAMER HYDROCHLORIDE 800 MG: 800 TABLET, FILM COATED PARENTERAL at 08:05

## 2021-02-12 RX ADMIN — ATORVASTATIN CALCIUM 40 MG: 40 TABLET, FILM COATED ORAL at 08:06

## 2021-02-12 RX ADMIN — GUAIFENESIN 600 MG: 600 TABLET, EXTENDED RELEASE ORAL at 22:23

## 2021-02-12 RX ADMIN — TRAZODONE HYDROCHLORIDE 25 MG: 50 TABLET ORAL at 22:23

## 2021-02-12 RX ADMIN — BUMETANIDE 4 MG: 1 TABLET ORAL at 08:06

## 2021-02-12 RX ADMIN — MELATONIN TAB 3 MG 6 MG: 3 TAB at 22:22

## 2021-02-12 NOTE — QUICK NOTE
RENAL ON CALL NOTE   Oliver Sharpe 68 y o  male MRN: [id number]  Unit/Bed#: [facility] 806-01 Encounter: [id number]    · Received message from P8 RN requesting nephrologist to speak to daughter  · I called the patient's daughter, Dxeter Kumar, at [phone]  · She was requesting to speak to Dr Charles Ace and explained to her that I am the person on call for tonight and will take care of the issues  · She related to me that her father is experiencing severe pain in the bladder which happens only while on dialysis at night  · The pain is quite severe  · She is requesting that we do not perform peritoneal dialysis tonight and requesting change to HD  · I told her that I will inform Dr Charles Ace in the morning  · For tonight, will hold off on CCPD

## 2021-02-12 NOTE — NURSING NOTE
Pt  Tolerated urgent PD well  Multiple alarms through each drain for low drain volume, as cycler was draining slowly  Able to drain enough each time to continue to next cycle  With last manual drain pt  Had positive UF (see cycler charting)

## 2021-02-12 NOTE — ASSESSMENT & PLAN NOTE
· Presented with 15 lb weight gain in one month, GONZALES, worsening lower extremity edema  Outpatient cardiology team ordered extra dose of diuretic however no improvement  Patient was noted to have AFSHIN on outpatient labs and referred to ED  · Outpatient diuretic regimen bumex 4 mg PO BID with k 20 BID and metolazone 5 mg  2 x weekly  · Heart failure following, was on Bumex drip for several days and now transitioned to PO Bumex  · Last echo 10/2020 showed preserved EF  Mild MR   Severe TR    · I/O, daily weights, low NA diet with FR  · Patient had PD cath placed 2/3; had initial cath removed and a new placement 2/9  · Started on PD on 02/10  · Stable

## 2021-02-12 NOTE — PHYSICAL THERAPY NOTE
Physical Therapy Evaluation     Patient's Name: [facility]    Admitting Diagnosis  CHF (congestive heart failure) ([place] [address] ) [I50 9]  Lower extremity edema [R60 0]  Dyspnea [R06 00]  AFSHIN (acute kidney injury) ([place] [address] ) [G45 5]  Acute diastolic CHF (congestive heart failure) ([place] [address] ) [I50 31]    Problem List  Patient Active Problem List   Diagnosis    Rotator cuff tear arthropathy, right    Secondary osteoarthritis of right shoulder    Benign essential hypertension    Abnormal TSH    Overweight (BMI 25 0-29  9)    Lumbar pain    Benign prostatic hyperplasia without lower urinary tract symptoms    Bradycardia    Dissection of thoracic aorta (HCC)    S/P aorta repair    Anticoagulation goal of INR 2 to 3    Hyperphosphatemia    Hypotension    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    Complication of vascular dialysis catheter    Hyperlipidemia    Sleep disorder    Encounter for post surgical wound check    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    Monoclonal gammopathy    Paroxysmal atrial fibrillation (HCC)    GONZALES (dyspnea on exertion)    Acute on chronic diastolic (congestive) heart failure (HCC)    Acute renal failure superimposed on stage 4 chronic kidney disease (HCC)    Anemia    Chronic kidney disease-mineral and bone disorder    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 ([place] [address] )    Rheumatoid arthritis ([place] [address] )    Atrial fibrillation (HCC)    Insomnia    ESRD (end stage renal disease) ([place] [address] )       Past Medical History  Past Medical History:   Diagnosis Date    Arthritis     BPH without urinary obstruction     CKD (chronic kidney disease), stage III     baseline 1 6-1 7    GERD (gastroesophageal reflux disease)     Hyperlipidemia     Hypertension     MI (myocardial infarction) ([place] [address] )        Past Surgical History  Past Surgical History:   Procedure Laterality Date    APPENDECTOMY      CARDIAC SURGERY      COLONOSCOPY  2017    FRACTURE SURGERY      IR PERITONEAL DIALYSIS CATHETER PLACEMENT  2/3/2021    IR PERITONEAL DIALYSIS CATHETER REMOVAL AND PLACEMENT NEW SITE  2/9/2021    IR TEMPORARY DIALYSIS CATHETER PLACEMENT  12/23/2019    IR THORACENTESIS  12/24/2019    IR THORACENTESIS  12/27/2019    IR TUNNELED DIALYSIS CATHETER CHECK/CHANGE/REPOSITION/ANGIOPLASTY  1/6/2020    IR TUNNELED DIALYSIS CATHETER PLACEMENT  1/2/2020    IR TUNNELED DIALYSIS CATHETER REMOVAL  3/4/2020    RI ASCEND AORTA GRAFT W ROOT REPLACMENT  VALVE CONDUIT/CORON RECONSTRUCT N/A 12/15/2019    Procedure: BENTALL PROCEDURE (ASCENDING AORTIC REPAIR) with 26mm Gelweave Graft;  Surgeon: Margaret Moreno DO;  [facility];  Service: Cardiac Surgery    RI RECONSTR TOTAL SHOULDER IMPLANT Right 12/5/2017    Procedure: ARTHROPLASTY SHOULDER REVERSE with OPEN CYST EXCISION;  Surgeon: Deshaun Bahena MD;  [facility];  Service: Orthopedics    SHOULDER SURGERY      WRIST SURGERY            02/12/21 0927   PT Last Visit   PT Visit Date 02/12/21   Note Type   Note type Evaluation   Pain Assessment   Pain Assessment Tool 0-10   Pain Score No Pain   Home Living   Type of Home Apartment   Home Layout One level  (0 CHRISTOPHER)   Bathroom Equipment Shower chair   Home Equipment Walker;Cane   Additional Comments  PRIOR TO THIS ADMISSION PATIENT RESIDED WITH SPOUSE IN A ONE LEVEL APARTMENT (0 CHRISTOPHER) WHERE AT BASELINE HE IS I WITH MOBILITY (NO AD) AND ADLS  HE REPORTS THAT HIS GRAND-DGHT IS A PAID CAREGIVER WHO ASSISTS HIM AND HIS WIFE DAILY FROM 7AM-6PM (IADLS TASKS)      Prior Function   Level of [place] Independent with ADLs and functional mobility   Lives With Spouse   Receives Help From Other (Comment)  (GRANDDGHT IS PAID CAREGIVER)   ADL Assistance Independent   IADLs Needs assistance   Falls in the last 6 months 0   Vocational Retired   Restrictions/Precautions   [place] Bearing Precautions Per Order No   Braces or Orthoses   (NONE)   Other Precautions   (MASIMO)   General Family/Caregiver Present No   Cognition   Overall Cognitive Status WFL   RUE Assessment   RUE Assessment WFL   LUE Assessment   LUE Assessment WFL   RLE Assessment   RLE Assessment WFL  (4-/5 GROSSLY )   LLE Assessment   LLE Assessment WFL  (4-/5 GROSSLY )   Bed Mobility   Supine to Sit 7  Independent   Additional items Increased time required   Sit to Supine 7  Independent   Transfers   Sit to Stand 7  Independent   Stand to Sit 7  Independent   Ambulation/Elevation   Gait pattern Excessively slow   Gait Assistance 7  Independent   Assistive Device None   Distance 20 FEET (DISTANCE LIMITED BY PHYSICIAN PRESENT TO SEE PATIENT)    Balance   Static Sitting Good   Static Standing Fair +   Ambulatory Fair   Endurance Deficit   Endurance Deficit Yes   Endurance Deficit Description REPORTS FATIGUE   Activity Tolerance   Activity Tolerance Patient limited by fatigue;Patient tolerated treatment well   Medical Staff Made Aware OT ANTONIO SANTOS   Nurse Made Aware DANIEL TO SEE PER RN PARVEEN   Assessment   Prognosis Good   Problem List Decreased endurance   Assessment PT COMPLETED EVALUATION OF 68YEAR OLD MALE ADMITTED TO \[facility]\"" ON 1/26/2021 WITH SOB  CURRENTE DIAGNOSES INCLUDE ACUTE ON CHRONIC CHF, ESRD (NEW START ON PD), AFIB, AND ANEMIA  CURRENT EMERGING STATUS INCLUDES ONGOING MONITORING OF VITAL SIGNS AND PD TREATMENT  PMH IS SIGNIFICANT FOR CKD IV, RIGHT TSA, AORTIC VALVE REPLACEMENT (2019), THORACENTESIS, AND HTN  PRIOR TO THIS ADMISSION PATIENT RESIDED WITH SPOUSE IN A ONE LEVEL APARTMENT (0 CHRISTOPHER) WHERE AT BASELINE HE IS I WITH MOBILITY (NO AD) AND ADLS  HE REPORTS THAT HIS GRAND-DGHT IS A PAID CAREGIVER WHO ASSISTS HIM AND HIS WIFE DAILY FROM 7AM-6PM (IADLS TASKS)  CURRENT IMPAIRMENTS INCLUDE DECREASED GROSS STRENGTH AND ACTIVITY TOLERANCE AND GAIT DEVIATIONS (REDUCED SPEED, FORWARD FLEXION)   DURING PT EVALUATION PATIENT WAS ABLE TO PERFORM BED MOBILITY, SIT<->STAND TRANSFER (FROM BED AND TOILET), AND HOUSEHOLD LEVEL AMBULATION INDEPENDENTLY  HE AMBULATED 20 FEET WITHIN ROOM WITH USE OF AD  ANTICIPATE HE IS FUNCTIONING AT/NEAR BASELINE AND RECOMMEND D/C HOME WITH ASSIST FROM SPOUSE PRN  PATIENT DENIES QUESTIONS/CONCERNS ABOUT D/C HOME  WILL D/C INPT PT  RECOMMEND CONTINUED SELF MOBILITY THIS ADMISSION  Goals   Patient Goals TO GO HOME   PT Treatment Day 0   Recommendation   PT Discharge Recommendation Return to previous environment with social support   Equipment Recommended   (NONE REQUIRED)   PT - OK to Discharge Yes  ([place] [address] )   [address]   Turning in Bed Without Bedrails 4   Lying on Back to Sitting on Edge of Flat Bed 4   Moving Bed to Chair 4   Standing Up From Chair 4   Walk in Room 4   Climb 3-5 Stairs 4   Basic Mobility Inpatient Raw Score 24   Basic Mobility Standardized Score 57 68     The patient's AM-PAC Basic Mobility Inpatient Short Form Raw Score is 24, Standardized Score is 57 68  A standardized score greater than 42 9 suggests the patient may benefit from discharge to home  Please also refer to the recommendation of the Physical Therapist for safe discharge planning        Zain Stone, PT, DPT

## 2021-02-12 NOTE — ASSESSMENT & PLAN NOTE
Lab Results   Component Value Date    EGFR 14 02/11/2021    EGFR 16 02/09/2021    EGFR 13 02/06/2021    CREATININE 3 76 (H) 02/11/2021    CREATININE 3 54 (H) 02/09/2021    CREATININE 4 19 (H) 02/06/2021     · Baseline creatinine 2-2 9 approximately    4 19 on presentation, now ESRD  · Holding norvasc 2/2 soft BP  · Known to Dr Irene Paez as outpatient-was on HD in past  · Peritoneal dialysis per nephrology   · Started on PD

## 2021-02-12 NOTE — PROGRESS NOTES
NEPHROLOGY PROGRESS NOTE    Oliver Sharpe 68 y o  male MRN: [id number]  [facility] Encounter: [id number]  Reason for Consult:  End-stage renal disease    Patient was awake alert up in walking in the room and looked well  States that PD went well overnight and seems to be draining well with no significant problems over last night  ASSESSMENT/PLAN:  1  Renal    Patient has progressed end-stage renal disease he is followed by 1 of my partners in the decision was made that he would do peritoneal dialysis  He was admitted to the hospital prior to me coming he had a PD catheter placed for urgent start  Initially did not function well so a new 1 was placed and he has undergone PD the last 2 nights  He seemed to tolerate last night without any problems  I have been in communication with the peritoneal dialysis nurse and he is going to need to be discharged and undergo urgent training  Does not appear that he is going to be able to go to training to either Monday or Wednesday of next week  I suspect that the patient will likely be able to be discharged potentially Monday morning and then will be able to initiate his training dialysis starting Wednesday as I have been in communication with the nurse  My partner will communicate with her over the weekend to arrange this  I did speak with his daughter yesterday and he does have somebody to train with who is a neighbor that is willing and wanting to do this  Continue nocturnal cycler PD  When discharge will go to outpatient dialysis for training for urgent PD  MELISSA brown  Current PD prescription is 8 our treatment time, 2000 cc fill total volume 8000 cc  Of note the patient still has good residual function and good urine output  SUBJECTIVE:  Review of Systems   Constitution: Negative for chills, fever, malaise/fatigue and night sweats  HENT: Negative  Eyes: Negative  Cardiovascular: Negative    Negative for chest pain, dyspnea on exertion, leg swelling and orthopnea  Respiratory: Negative  Negative for cough, shortness of breath, sputum production and wheezing  Skin: Negative  Gastrointestinal: Negative for abdominal pain, diarrhea, nausea and vomiting  Reports that when PD was started with the infusion he felt a little discomfort in his bladder region but then that cleared up  Genitourinary: Negative for dysuria, flank pain, hematuria and incomplete emptying  Neurological: Negative for dizziness, focal weakness, headaches and weakness  Psychiatric/Behavioral: Negative for altered mental status, depression, hallucinations and hypervigilance  OBJECTIVE:  Current Weight: Weight - Scale: 75 2 kg (165 lb 12 6 oz)(dry weight)  Vitals:Temp (24hrs), Av 5 °F (36 4 °C), Min:96 8 °F (36 °C), Max:98 4 °F (36 9 °C)  Current: Temperature: (!) 96 8 °F (36 °C)   Blood pressure 102/58, pulse 58, temperature (!) 96 8 °F (36 °C), temperature source Oral, resp  rate 16, height 5' 7" (1 702 m), weight 75 2 kg (165 lb 12 6 oz), SpO2 93 %  , Body mass index is 25 97 kg/m²  Intake/Output Summary (Last 24 hours) at 2021 1037  Last data filed at 2021 0413  Gross per 24 hour   Intake 360 ml   Output 1050 ml   Net -690 ml       Physical Exam: /58   Pulse 58   Temp (!) 96 8 °F (36 °C) (Oral)   Resp 16   Ht 5' 7" (1 702 m)   Wt 75 2 kg (165 lb 12 6 oz) Comment: dry weight  SpO2 93%   BMI 25 97 kg/m²   Physical Exam  Constitutional:       General: He is not in acute distress  Appearance: He is not ill-appearing, toxic-appearing or diaphoretic  HENT:      Head: Normocephalic and atraumatic  Neck:      Musculoskeletal: Neck supple  Vascular: No JVD  Cardiovascular:      Rate and Rhythm: Normal rate and regular rhythm  Heart sounds: No friction rub  No gallop  Comments: No significant edema  Pulmonary:      Effort: Pulmonary effort is normal  No tachypnea        Breath sounds: Normal breath sounds  No decreased breath sounds, wheezing, rhonchi or rales  Abdominal:      General: Bowel sounds are normal       Palpations: Abdomen is soft  Tenderness: There is no abdominal tenderness  There is no rebound  Neurological:      General: No focal deficit present  Mental Status: He is alert and oriented to person, place, and time  Psychiatric:         Mood and Affect: Mood normal  Mood is not anxious  Behavior: Behavior normal  Behavior is not agitated           Medications:    Current Facility-Administered Medications:     acetaminophen (TYLENOL) tablet 650 mg, 650 mg, Oral, Q6H PRN, EL Roth    atorvastatin (LIPITOR) tablet 40 mg, 40 mg, Oral, Daily, EL Roth, 40 mg at 02/12/21 0806    benzonatate (TESSALON PERLES) capsule 100 mg, 100 mg, Oral, TID PRN, Brittany Sears PA-C, 100 mg at 02/10/21 2301    bumetanide (BUMEX) tablet 4 mg, 4 mg, Oral, BID, Jetnilsa Andre, DO, 4 mg at 02/12/21 0806    guaiFENesin (MUCINEX) 12 hr tablet 600 mg, 600 mg, Oral, Q12H [address], Panchito Santos PA-C, 600 mg at 02/12/21 0805    HYDROmorphone (DILAUDID) injection 0 2 mg, 0 2 mg, Intravenous, Q6H PRN, Panchito Santos PA-C    lidocaine 1% buffered, , Infiltration, Code/Trauma/Sedation Med, Samreen Dailey MD, 20 mL at 02/09/21 1402    melatonin tablet 6 mg, 6 mg, Oral, HS, EL Roth, 6 mg at 02/11/21 2111    ondansetron (ZOFRAN) injection 4 mg, 4 mg, Intravenous, Q6H PRN, EL Roth    oxyCODONE (ROXICODONE) immediate release tablet 10 mg, 10 mg, Oral, Q4H PRN, Samreen Dailey MD, 10 mg at 02/11/21 2207    oxyCODONE (ROXICODONE) IR tablet 5 mg, 5 mg, Oral, Q4H PRN, Samreen Dailey MD    polyethylene glycol (MIRALAX) packet 17 g, 17 g, Oral, Daily, Lars Bonilla DO    senna-docusate sodium (SENOKOT S) 8 6-50 mg per tablet 1 tablet, 1 tablet, Oral, BID, Lars Bonilla DO    sevelamer (RENAGEL) tablet 800 mg, 800 mg, Oral, TID With Meals, Rox Torres DO, 800 mg at 02/12/21 0805    Insert peripheral IV, , , Once **AND** sodium chloride (PF) 0 9 % injection 3 mL, 3 mL, Intravenous, Q1H PRN, EL Roth    traZODone (DESYREL) tablet 25 mg, 25 mg, Oral, HS, EL Roth, 25 mg at 02/11/21 2111    warfarin (COUMADIN) tablet 3 mg, 3 mg, Oral, Daily (warfarin), [facility] DO Ihsan, 3 mg at 02/11/21 1715    Laboratory Results:  Lab Results   Component Value Date    WBC 8 74 02/11/2021    HGB 8 6 (L) 02/11/2021    HCT 27 8 (L) 02/11/2021    MCV 99 (H) 02/11/2021     (L) 02/11/2021     Lab Results   Component Value Date    SODIUM 140 02/11/2021    K 4 2 02/11/2021     02/11/2021    CO2 33 (H) 02/11/2021    BUN 97 (H) 02/11/2021    CREATININE 3 76 (H) 02/11/2021    GLUC 117 02/11/2021    CALCIUM 8 6 02/11/2021     Lab Results   Component Value Date    CALCIUM 8 6 02/11/2021    PHOS 5 4 (H) 01/30/2021     No results found for: LABPROT

## 2021-02-12 NOTE — OCCUPATIONAL THERAPY NOTE
Occupational Therapy Evaluation     Patient Name: Janet Hurt  Today's Date: 2/12/2021  Problem List  Principal Problem:    Acute on chronic diastolic (congestive) heart failure (HCC)  Active Problems:    Benign essential hypertension    Bradycardia    Dissection of thoracic aorta (HCC)    Hyperlipidemia    Anemia    Atrial fibrillation (HCC)    Insomnia    [facility]) [facility]    Past Medical History  Past Medical History:   Diagnosis Date    Arthritis     BPH without urinary obstruction     CKD (chronic kidney disease), stage III     baseline 1 6-1 7    GERD (gastroesophageal reflux disease)     Hyperlipidemia     Hypertension     MI (myocardial infarction) ([facility] )      Past Surgical History  Past Surgical History:   Procedure Laterality Date    APPENDECTOMY      CARDIAC SURGERY      COLONOSCOPY  2017    FRACTURE SURGERY      IR PERITONEAL DIALYSIS CATHETER PLACEMENT  2/3/2021    IR PERITONEAL DIALYSIS CATHETER REMOVAL AND PLACEMENT NEW SITE  2/9/2021    IR TEMPORARY DIALYSIS CATHETER PLACEMENT  12/23/2019    IR THORACENTESIS  12/24/2019    IR THORACENTESIS  12/27/2019    IR TUNNELED DIALYSIS CATHETER CHECK/CHANGE/REPOSITION/ANGIOPLASTY  1/6/2020    IR TUNNELED DIALYSIS CATHETER PLACEMENT  1/2/2020    IR TUNNELED DIALYSIS CATHETER REMOVAL  3/4/2020    AL ASCEND AORTA GRAFT W ROOT REPLACMENT  VALVE CONDUIT/CORON RECONSTRUCT N/A 12/15/2019    Procedure: BENTALL PROCEDURE (ASCENDING AORTIC REPAIR) with 26mm Gelweave Graft;  Surgeon: Graciela Nickerson DO;  [facility];  Service: Cardiac Surgery    AL RECONSTR TOTAL SHOULDER IMPLANT Right 12/5/2017    Procedure: ARTHROPLASTY SHOULDER REVERSE with OPEN CYST EXCISION;  Surgeon: Herve Montague MD;  [facility];  Service: Orthopedics    SHOULDER SURGERY      WRIST SURGERY               02/12/21 1024   OT Last Visit   OT Visit Date 02/12/21   Note Type   Note type Evaluation   Restrictions/Precautions   Weight Bearing Precautions Per Order No   Other Precautions Telemetry  (Swedish speaking -ablet to follow simple commands in English)   Pain Assessment   Pain Assessment Tool Pain Assessment not indicated - pt denies pain   Pain Score No Pain   Home Living   Type of Home Apartment   Home Layout One level   Bathroom Shower/Tub Walk-in shower   Bathroom Toilet Standard   Bathroom Equipment Grab bars in shower; Shower chair;Commode   Home Equipment Walker;Cane;Grab bars   Prior Function   Level of [place] Independent with ADLs and functional mobility   Lives With Josefina Help From Other (Comment)  (granddaughter is a paid caregiver 5x week "all day" per pt)   ADL Assistance Independent   IADLs Needs assistance   Vocational Retired   Lifestyle   Autonomy I with ADL's, assistance with I ADL's, no AD with functional ambulation   Reciprocal Relationships spouse, granddaughter (caregiver)   Service to Others retired   Intrinsic Gratification  music   Psychosocial   Psychosocial (WDL) [address]  7  Independent   Grooming Assistance 7  [facility] 5  [address]  Supervision/Setup    [facility]  [facility]  7  Deficit Setup   LB Dressing Assistance 5  Supervision/Setup   LB Dressing Deficit Setup;Don/doff R sock; Don/doff L sock  (+hip flexion)   Toileting Assistance  6  Modified independent   Bed Mobility   Supine to Sit 7  Independent   Sit to Supine 7  Independent   Transfers   Sit to Stand 7  Independent   Stand to Sit 7  Independent   Functional Mobility   Functional Mobility 6  Modified independent   Additional Comments mod I with functional mobility (short distance within room) +increase time   Balance   Static Sitting Good   Dynamic Sitting Fair +   Static Standing Fair +   Dynamic Standing Fair   Ambulatory Fair   Activity Tolerance   Activity Tolerance Patient tolerated treatment well   Medical Staff Made Aware PT Douglas Schmitt   Nurse Made Aware RN cleared pt for therapy   RUE Assessment   RUE Assessment WFL   LUE Assessment   LUE Assessment WFL   Hand Function   Gross Motor Coordination Functional   Fine Motor Coordination Functional   Vision-Basic Assessment   Current Vision Wears glasses all the time   Cognition   Arousal/Participation Alert; Cooperative   Attention Within functional limits   Orientation Level Oriented X4   Memory Decreased recall of precautions; Within functional limits   Following Commands Follows multistep commands with increased time or repetition   Comments pt alert and oriented x4, pleasant and cooperative, increased processing time with date stating "[facility] with cues able to state 2021"/possible language barrier (?), suspect forgetful at times  Pt has S/support at baseline  Assessment   Limitation Decreased high-level ADLs   Prognosis Good   Assessment Pt is a 68 y o  male who was admitted to [facility] on 1/26/2021 with Acute on chronic diastolic (congestive) heart failure (HCC), ESRD, a-fib, anemia, dissection of thoracic aorta, bradycardia, HTN    Pt's problem list also includes PMH of GONZALES, ARF CKD, RA, s/p aorta repair, rotator cuff tear to R, abnormal TSh, overweight, lumbar pain, hypotension, monoclonal gammopathy, complication of vascular dialysis catheter, hyperphosphatemia,   At baseline pt was completing I with ADL's, assistance with IADL's, no AD with functional ambulation  Pt lives with spouse in a 1st floor apartment with 0STE  Currently pt requires set-up for overall ADLS and I/mod I without AD for functional mobility/transfers  Pt currently presents with impairments in the following categories -difficulty performing IADLS  activity tolerance   These impairments, as well as pt's fatigue and risk for falls  limit pt's ability to safely engage in all baseline areas of occupation, includingcommunity mobility, house maintenance, social participation  and leisure activities  The patient's raw score on the AM-PAC Daily Activity inpatient short form is 24, standardized score is 57 54, greater than 39 4  Patients at this level are likely to benefit from DC to home  Please refer to the recommendation of the Occupational Therapist for safe DC planning  From OT standpoint, recommend home with family support upon D/C   No further acute OT needs indicated at this time - Recommend continued oob for meals, ambulation to/from BR, setup for self care tasks and mobility in hallway with nursing/restorative - d/c from caseload with above recommendations   Goals   Patient Goals go home   Recommendation   OT Discharge Recommendation Return to previous environment with social support   Equipment Recommended   (pt has all DME at baseline)   OT - OK to Discharge Yes   AM-PAC Daily Activity Inpatient   Lower Body Dressing 4   Bathing 4   Toileting 4   Upper Body Dressing 4   Grooming 4   Eating 4   Daily Activity Raw Score 24   Daily Activity Standardized Score (Calc for Raw Score >=11) 57 54   AM-PAC Applied Cognition Inpatient   Following a Speech/Presentation 2   Understanding Ordinary Conversation 4   Taking Medications 3   Remembering Where Things Are Placed or Put Away 4   Remembering List of 4-5 Errands 3   Taking Care of Complicated Tasks 2   Applied Cognition Raw Score 18   Applied Cognition Standardized Score 38 07        Viviana Crump MOT, OTR/L

## 2021-02-12 NOTE — PROGRESS NOTES
Heart Failure/ Pulmonary Hypertension Progress Note - Oliver Sharpe 68 y o  male MRN: [id number]    [facility] Encounter: [id number]      Assessment:    Principal Problem:    Acute on chronic diastolic (congestive) heart failure (HCC)  Active Problems:    Benign essential hypertension    Bradycardia    Dissection of thoracic aorta (HCC)    Hyperlipidemia    Anemia    Atrial fibrillation (HCC)    Insomnia    ESRD (end stage renal disease) (HCC)      Subjective:   Oliver Sharpe is a 68year-old man with PMH as below who presented to [facility] 01/26/2021 after being contacted by cardiology office to review abnormal renal function  Patient had increased frequency of metolazone dosing and had minimal improvement with no reported weight loss or symptomatic improvement  Case was discussed with Dr Su Blanco and decision was made to have patient proceed to ED for further evaluation  Patient seen and examined  PD treatment last PM with negative fluid balance   Bradycardia improved with holding BB  Already transitioned to oral diuretic by nephro  Anticipating discharge home soon  Outpatient follow ups arranged  Objective: Intake/ Output: 540/1350/-810  Weight:  165 lbs      Acute on chronic HFpEF - Volume management: Bumex 4mg PO BID  S/P PD catheter placement and treatments started   BP controlled at this point off antihypertensives  Continue to monitor  May need to restart Amlodipine as outpatient    Currently enrolled in [facility] program                      TTE 04/24/2019: LVEF 60%  LVIDd 4 31 cm  IVSd 1 4 cm  Grade 1 DD  Normal RV size and RVSF  Mild TR  PASP 35 mmHg                TTE 10/06/2020: LVEF 60%  LVIDd 4 69 cm  IVSd 1 41 cm  Grade 2 DD  Normal RV size and RVSF  KUNAL  Mild MR  Severe TR                Paroxysmal atrial [id number] = 4 (age, CHF, HTN)  Anticoagulation on Coumadin  Persistently bradycardic this admit into the mid 30's  Trial off Metop for now and monitor  Bradycardia  With rates in the 30-40s seen on telemetry this admit  Improved  Consider further monitoring as outpatient, ie Zio patch or HM to further evaluate   BB d/c for now  Continue to monitor response as outpatient        Hypertension  Well controlled  Amlodipine 5 mg daily currently on hold for soft BP        Hyperlipidemia  FLP 1/26/21: TC 88, TD 69, HDL 42, LDL 32  Continue on atorvastatin 40 mg daily       Chronic kidney disease, stage IV Baseline creatinine was 2 0-2 3, new baseline likely 3 5-4  S/P PD cath placement on 2/3/21     Follows with Dr Kristen Saldaña as outpatient  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 with hemiarch reconstruction and aortic valve resuspension with a 26 mm Dacron graft on 12/15/2019 by Dr Shane Byers               Benign prostatic hyperplasia  History of COVID-19 infection: diagnosed in 07/2020  Review of Systems   All other systems reviewed and are negative  [facility] (day, reason): [place] catheter (day, reason):    Vitals: Blood pressure 102/58, pulse 58, temperature (!) 96 8 °F (36 °C), temperature source Oral, resp  rate 16, height 5' 7" (1 702 m), weight 75 2 kg (165 lb 12 6 oz), SpO2 93 %  , Body mass index is 25 97 kg/m² , I/O last 3 completed shifts: In: 540 [P O :540]  Out: 1950 [Urine:1950]  No intake/output data recorded  Wt Readings from Last 3 Encounters:   02/12/21 75 2 kg (165 lb 12 6 oz)   01/19/21 80 3 kg (177 lb)   01/11/21 78 9 kg (174 lb)       Intake/Output Summary (Last 24 hours) at 2/12/2021 0810  Last data filed at 2/12/2021 0413  Gross per 24 hour   Intake 540 ml   Output 1350 ml   Net -810 ml     I/O last 3 completed shifts:   In: 540 [P O :540]  Out: 1950 [ANMAM:3472]          Physical Exam:  Vitals:    02/12/21 0310 02/12/21 0410 02/12/21 0500 02/12/21 0721   BP: 121/66 (!) 83/49  102/58   Pulse: 78 59  58   Resp:    16   Temp:    (!) 96 8 °F (36 °C)   TempSrc:    Oral   SpO2: 95% 91%  93%   Weight: 75 2 kg (165 lb 12 6 oz)    Height:           GEN: Oliver SEBASTIAN Core appears well, alert and oriented x 3, pleasant and cooperative   HEENT: pupils equal, round, and reactive to light; extraocular muscles intact  NECK: supple, no carotid bruits   HEART: regular rhythm, normal S1 and S2, no murmurs, clicks, gallops or rubs, JVP is flat  LUNGS: diminished  to auscultation bilaterally; no  Wheezes, no rales, no rhonchi, few bibasilar crackles     ABDOMEN: normal bowel sounds, soft, no tenderness, no distention  EXTREMITIES: peripheral pulses normal; no clubbing, cyanosis, or edema  NEURO: no focal findings   SKIN: normal without suspicious lesions on exposed skin      Current Facility-Administered Medications:     acetaminophen (TYLENOL) tablet 650 mg, 650 mg, Oral, Q6H PRN, EL oRth    atorvastatin (LIPITOR) tablet 40 mg, 40 mg, Oral, Daily, EL Roth, 40 mg at 02/12/21 0806    benzonatate (TESSALON PERLES) capsule 100 mg, 100 mg, Oral, TID PRN, Brittany Sears PA-C, 100 mg at 02/10/21 2301    bumetanide (BUMEX) tablet 4 mg, 4 mg, Oral, BID, Jet Andre, DO, 4 mg at 02/12/21 0806    guaiFENesin (MUCINEX) 12 hr tablet 600 mg, 600 mg, Oral, Q12H [facility], [facility] CHIDI Santos PA-C, 600 mg at 02/12/21 0805    HYDROmorphone (DILAUDID) injection 0 2 mg, 0 2 mg, Intravenous, Q6H PRN, [facility] PA-C    lidocaine 1% buffered, , Infiltration, Code/Trauma/Sedation Med, Jomar Dyer MD, 20 mL at 02/09/21 1402    melatonin tablet 6 mg, 6 mg, Oral, HS, EL Roth, 6 mg at 02/11/21 2111    ondansetron (ZOFRAN) injection 4 mg, 4 mg, Intravenous, Q6H PRN, EL Roth    oxyCODONE (ROXICODONE) immediate release tablet 10 mg, 10 mg, Oral, Q4H PRN, Jomar Dyer MD, 10 mg at 02/11/21 2207    oxyCODONE (ROXICODONE) IR tablet 5 mg, 5 mg, Oral, Q4H PRN, Jomar Dyer MD    polyethylene glycol (MIRALAX) packet 17 g, 17 g, Oral, Daily PRN, DO Brian Rothman (RENAGEL) tablet 800 mg, 800 mg, Oral, TID With Meals, Mayamonalisa Wilian Torres DO, 800 mg at 02/12/21 0805    Insert peripheral IV, , , Once **AND** sodium chloride (PF) 0 9 % injection 3 mL, 3 mL, Intravenous, Q1H PRN, EL Roth    traZODone (DESYREL) tablet 25 mg, 25 mg, Oral, HS, EL Roth, 25 mg at 02/11/21 2111    warfarin (COUMADIN) tablet 3 mg, 3 mg, Oral, Daily (warfarin), Rajani Montenegro DO, 3 mg at 02/11/21 1715      Labs & Results:        Results from last 7 days   Lab Units 02/11/21  0514   WBC Thousand/uL 8 74   HEMOGLOBIN g/dL 8 6*   HEMATOCRIT % 27 8*   PLATELETS Thousands/uL 100*         Results from last 7 days   Lab Units 02/11/21  0514 02/09/21  1744 02/06/21  0519   POTASSIUM mmol/L 4 2 4 1 4 1   CHLORIDE mmol/L 103 107 107   CO2 mmol/L 33* 30 24   BUN mg/dL 97* 91* 108*   CREATININE mg/dL 3 76* 3 54* 4 19*   CALCIUM mg/dL 8 6 9 2 9 5     Results from last 7 days   Lab Units 02/12/21  0450 02/10/21  0529 02/09/21  0418   INR  1 85* 1 71* 1 98*         Counseling / Coordination of Care  Total floor / unit time spent today 20 minutes  Greater than 50% of total time was spent with the patient and / or family counseling and / or coordination of care  A description of the counseling / coordination of care: 20  Jenelle Olivares

## 2021-02-12 NOTE — PROGRESS NOTES
Progress Note - Oliver Sharpe 1940, 68 y o  male MRN: [id number]    [facility]: [id number]    Primary Care Provider: EL Mendosa   Date and time admitted to hospital: 1/26/2021  1:24 PM        * Acute on chronic diastolic (congestive) heart failure ([address] )  Assessment & Plan  · Presented with 15 lb weight gain in one month, GONZALES, worsening lower extremity edema  Outpatient cardiology team ordered extra dose of diuretic however no improvement  Patient was noted to have AFSHIN on outpatient labs and referred to ED  · Outpatient diuretic regimen bumex 4 mg PO BID with k 20 BID and metolazone 5 mg  2 x weekly  · Heart failure following, was on Bumex drip for several days and now transitioned to PO Bumex  · Last echo 10/2020 showed preserved EF  Mild MR  Severe TR    · I/O, daily weights, low NA diet with FR  · Patient had PD cath placed 2/3; had initial cath removed and a new placement 2/9  · Started on PD on 02/10  · Stable    ESRD (end stage renal disease) [facility])  Assessment & Plan  Lab Results   Component Value Date    EGFR 14 02/11/2021    EGFR 16 02/09/2021    EGFR 13 02/06/2021    CREATININE 3 76 (H) 02/11/2021    CREATININE 3 54 (H) 02/09/2021    CREATININE 4 19 (H) 02/06/2021     · Baseline creatinine 2-2 9 approximately  4 19 on presentation, now ESRD  · Holding norvasc 2/2 soft BP  · Known to Dr Gabriele Esquivel as outpatient-was on HD in past  · Peritoneal dialysis per nephrology   · Started on PD    Atrial fibrillation [facility])  Assessment & Plan  · Rate controlled on Lopressor  · Anticoagulated on Coumadin   · Patient takes 2 mg daily at home  · Monitor INR    Anemia  Assessment & Plan  · Multifactorial in setting of CKD  · Monitor    Dissection of thoracic aorta [facility])  Assessment & Plan  · 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  · 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    Risk associated with surgical options therefore recommended continued observation  Bradycardia  Assessment & Plan  Also with 2 second pause noted earlier in hospital stay  · Lopressor held  · Asymptomatic  · Appreciate cardiology recommendations, possible Zio patch as outpatient     Benign essential hypertension  Assessment & Plan  · Chronic; history of HTN  · Blood pressures have been labile  · Norvasc and Lopressor on hold  · Monitor vital signs  VTE Pharmacologic Prophylaxis:   Pharmacologic: Warfarin (Coumadin)  Mechanical VTE Prophylaxis in Place: Yes    Patient Centered Rounds: I have performed bedside rounds with nursing staff today  Discussions with Specialists or Other Care Team Provider:     Education and Discussions with Family / Patient:  Patient    Time Spent for Care: 45 minutes  More than 50% of total time spent on counseling and coordination of care as described above  Current Length of Stay: 17 day(s)    Current Patient Status: Inpatient   Certification Statement: The patient will continue to require additional inpatient hospital stay due to Above    Discharge Plan / Estimated Discharge Date:  Awaiting placement and teaching for peritoneal dialysis    Code Status: Level 1 - Full Code      Subjective:   Patient seen and examined  Comfortable sitting in chair  No chest pain or shortness of breath  Requesting stool softeners    Objective:     Vitals:   Temp (24hrs), Av 5 °F (36 4 °C), Min:96 8 °F (36 °C), Max:98 4 °F (36 9 °C)    Temp:  [96 8 °F (36 °C)-98 4 °F (36 9 °C)] 96 8 °F (36 °C)  HR:  [58-79] 58  Resp:  [16-20] 16  BP: ()/(49-70) 102/58  SpO2:  [91 %-98 %] 93 %  Body mass index is 25 97 kg/m²  Input and Output Summary (last 24 hours):        Intake/Output Summary (Last 24 hours) at 2021 1002  Last data filed at 2021 0413  Gross per 24 hour   Intake 360 ml   Output 1050 ml   Net -690 ml       Physical Exam:     Physical Exam     Patient is awake alert oriented no acute distress  Lung clear to auscultation bilateral  Heart positive I4-V6 with systolic murmur  Abdomen soft nontender  Lower extremities no edema       Additional Data:     Labs:    Results from last 7 days   Lab Units 02/11/21  0514   WBC Thousand/uL 8 74   HEMOGLOBIN g/dL 8 6*   HEMATOCRIT % 27 8*   PLATELETS Thousands/uL 100*     Results from last 7 days   Lab Units 02/11/21  0514   POTASSIUM mmol/L 4 2   CHLORIDE mmol/L 103   CO2 mmol/L 33*   BUN mg/dL 97*   CREATININE mg/dL 3 76*   CALCIUM mg/dL 8 6     Results from last 7 days   Lab Units 02/12/21  0450   INR  1 85*       * I Have Reviewed All Lab Data Listed Above  * Additional Pertinent Lab Tests Reviewed:  Lucila Ybarra Admission Reviewed    Imaging:    Imaging Reports Reviewed Today Include:   Imaging Personally Reviewed by Myself Includes:      Recent Cultures (last 7 days):           Last 24 Hours Medication List:   Current Facility-Administered Medications   Medication Dose Route Frequency Provider Last Rate    acetaminophen  650 mg Oral Q6H PRN EL Sandhu      atorvastatin  40 mg Oral Daily EL Sandhu      benzonatate  100 mg Oral TID PRN Brittany Sears PA-C      bumetanide  4 mg Oral BID Daev SousDO      guaiFENesin  600 mg Oral Q12H [address] Panchito Santos PA-C      HYDROmorphone  0 2 mg Intravenous Q6H PRN Rebeca Jean PA-C      lidocaine 1% buffered   Infiltration Code/Trauma/Sedation Med Lance Adamson MD      melatonin  6 mg Oral HS EL Roth      ondansetron  4 mg Intravenous Q6H PRN EL Sandhu      oxyCODONE  10 mg Oral Q4H PRN Lance Adamson MD      oxyCODONE  5 mg Oral Q4H PRN Lance Adamson MD      polyethylene glycol  17 g Oral Daily Danny Guido DO      senna-docusate sodium  1 tablet Oral BID Danny Guido DO      sevelamer  800 mg Oral TID With Meals Garrick Torres DO      sodium chloride (PF)  3 mL Intravenous Q1H PRN EL Lamb      traZODone  25 mg Oral HS EL Roth      warfarin  3 mg Oral Daily (warfarin) Marcha Gitelman, DO          Today, Patient Was Seen By: Marcha Gitelman, DO    ** Please Note: This note has been constructed using a voice recognition system   **

## 2021-02-12 NOTE — ASSESSMENT & PLAN NOTE
· Chronic; history of HTN  · Blood pressures have been labile  · Norvasc and Lopressor on hold  · Monitor vital signs

## 2021-02-13 LAB
ANION GAP SERPL CALCULATED.3IONS-SCNC: 3 MMOL/L (ref 4–13)
BUN SERPL-MCNC: 98 MG/DL (ref 5–25)
CALCIUM SERPL-MCNC: 8.5 MG/DL (ref 8.3–10.1)
CHLORIDE SERPL-SCNC: 108 MMOL/L (ref 100–108)
CO2 SERPL-SCNC: 31 MMOL/L (ref 21–32)
CREAT SERPL-MCNC: 3.39 MG/DL (ref 0.6–1.3)
GFR SERPL CREATININE-BSD FRML MDRD: 16 ML/MIN/1.73SQ M
GLUCOSE SERPL-MCNC: 80 MG/DL (ref 65–140)
POTASSIUM SERPL-SCNC: 3.9 MMOL/L (ref 3.5–5.3)
SODIUM SERPL-SCNC: 142 MMOL/L (ref 136–145)

## 2021-02-13 PROCEDURE — 80048 BASIC METABOLIC PNL TOTAL CA: CPT | Performed by: INTERNAL MEDICINE

## 2021-02-13 PROCEDURE — NC001 PR NO CHARGE: Performed by: RADIOLOGY

## 2021-02-13 PROCEDURE — 99233 SBSQ HOSP IP/OBS HIGH 50: CPT | Performed by: INTERNAL MEDICINE

## 2021-02-13 RX ORDER — CEFAZOLIN SODIUM 1 G/50ML
1000 SOLUTION INTRAVENOUS ONCE
Status: CANCELLED | OUTPATIENT
Start: 2021-02-13 | End: 2021-02-13

## 2021-02-13 RX ADMIN — GUAIFENESIN 600 MG: 600 TABLET, EXTENDED RELEASE ORAL at 21:45

## 2021-02-13 RX ADMIN — BUMETANIDE 4 MG: 1 TABLET ORAL at 17:15

## 2021-02-13 RX ADMIN — SEVELAMER HYDROCHLORIDE 800 MG: 800 TABLET, FILM COATED PARENTERAL at 09:47

## 2021-02-13 RX ADMIN — ATORVASTATIN CALCIUM 40 MG: 40 TABLET, FILM COATED ORAL at 09:47

## 2021-02-13 RX ADMIN — TRAZODONE HYDROCHLORIDE 25 MG: 50 TABLET ORAL at 21:45

## 2021-02-13 RX ADMIN — SENNOSIDES AND DOCUSATE SODIUM 1 TABLET: 8.6; 5 TABLET ORAL at 17:15

## 2021-02-13 RX ADMIN — GUAIFENESIN 600 MG: 600 TABLET, EXTENDED RELEASE ORAL at 09:48

## 2021-02-13 RX ADMIN — MELATONIN TAB 3 MG 6 MG: 3 TAB at 21:45

## 2021-02-13 RX ADMIN — WARFARIN SODIUM 3 MG: 2 TABLET ORAL at 17:15

## 2021-02-13 RX ADMIN — SEVELAMER HYDROCHLORIDE 800 MG: 800 TABLET, FILM COATED PARENTERAL at 11:57

## 2021-02-13 RX ADMIN — SEVELAMER HYDROCHLORIDE 800 MG: 800 TABLET, FILM COATED PARENTERAL at 17:15

## 2021-02-13 NOTE — PLAN OF CARE
Problem: Potential for Falls  Goal: Patient will remain free of falls  Description: INTERVENTIONS:  - Assess patient frequently for physical needs  -  Identify cognitive and physical deficits and behaviors that affect risk of falls    -  [place] fall precautions as indicated by assessment   - Educate patient/family on patient safety including physical limitations  - Instruct patient to call for assistance with activity based on assessment  - Modify environment to reduce risk of injury  - Consider OT/PT consult to assist with strengthening/mobility  Outcome: Progressing     Problem: CARDIOVASCULAR - ADULT  Goal: Maintains optimal cardiac output and hemodynamic stability  Description: INTERVENTIONS:  - Monitor I/O, vital signs and rhythm  - Monitor for S/S and trends of decreased cardiac output  - Administer and titrate ordered vasoactive medications to optimize hemodynamic stability  - Assess quality of pulses, skin color and temperature  - Assess for signs of decreased coronary artery perfusion  - Instruct patient to report change in severity of symptoms  Outcome: Progressing     Problem: METABOLIC, FLUID AND ELECTROLYTES - ADULT  Goal: Electrolytes maintained within normal limits  Description: INTERVENTIONS:  - Monitor labs and assess patient for signs and symptoms of electrolyte imbalances  - Administer electrolyte replacement as ordered  - Monitor response to electrolyte replacements, including repeat lab results as appropriate  - Instruct patient on fluid and nutrition as appropriate  Outcome: Progressing  Goal: Fluid balance maintained  Description: INTERVENTIONS:  - Monitor labs   - Monitor I/O and WT  - Instruct patient on fluid and nutrition as appropriate  - Assess for signs & symptoms of volume excess or deficit  Outcome: Progressing     Problem: MUSCULOSKELETAL - ADULT  Goal: Maintain or return mobility to safest level of function  Description: INTERVENTIONS:  - Assess patient's ability to carry out ADLs; assess patient's baseline for ADL function and identify physical deficits which impact ability to perform ADLs (bathing, care of mouth/teeth, toileting, grooming, dressing, etc )  - Assess/evaluate cause of self-care deficits   - Assess range of motion  - Assess patient's mobility  - Assess patient's need for assistive devices and provide as appropriate  - Encourage maximum independence but intervene and supervise when necessary  - Involve family in performance of ADLs  - Assess for home care needs following discharge   - Consider OT consult to assist with ADL evaluation and planning for discharge  - Provide patient education as appropriate  Outcome: Progressing     Problem: Nutrition/Hydration-ADULT  Goal: Nutrient/Hydration intake appropriate for improving, restoring or maintaining nutritional needs  Description: Monitor and assess patient's nutrition/hydration status for malnutrition  Collaborate with interdisciplinary team and initiate plan and interventions as ordered  Monitor patient's weight and dietary intake as ordered or per policy  Utilize nutrition screening tool and intervene as necessary  Determine patient's food preferences and provide high-protein, high-caloric foods as appropriate       INTERVENTIONS:  - Monitor oral intake, urinary output, labs, and treatment plans  - Assess nutrition and hydration status and recommend course of action  - Evaluate amount of meals eaten  - Assist patient with eating if necessary   - Allow adequate time for meals  - Recommend/ encourage appropriate diets, oral nutritional supplements, and vitamin/mineral supplements  - Order, calculate, and assess calorie counts as needed  - Recommend, monitor, and adjust tube feedings and TPN/PPN based on assessed needs  - Assess need for intravenous fluids  - Provide specific nutrition/hydration education as appropriate  - Include patient/family/caregiver in decisions related to nutrition  Outcome: Progressing     Problem: PAIN - ADULT  Goal: Verbalizes/displays adequate comfort level or baseline comfort level  Description: Interventions:  - Encourage patient to monitor pain and request assistance  - Assess pain using appropriate pain scale  - Administer analgesics based on type and severity of pain and evaluate response  - Implement non-pharmacological measures as appropriate and evaluate response  - Consider cultural and social influences on pain and pain management  - Notify physician/advanced practitioner if interventions unsuccessful or patient reports new pain  Outcome: Progressing     Problem: INFECTION - ADULT  Goal: Absence or prevention of progression during hospitalization  Description: INTERVENTIONS:  - Assess and monitor for signs and symptoms of infection  - Monitor lab/diagnostic results  - Monitor all insertion sites, i e  indwelling lines, tubes, and drains  - Monitor endotracheal if appropriate and nasal secretions for changes in amount and color  - [place] appropriate cooling/warming therapies per order  - Administer medications as ordered  - Instruct and encourage patient and family to use good hand hygiene technique  - Identify and instruct in appropriate isolation precautions for identified infection/condition  Outcome: Progressing     Problem: SAFETY ADULT  Goal: Patient will remain free of falls  Description: INTERVENTIONS:  - Assess patient frequently for physical needs  -  Identify cognitive and physical deficits and behaviors that affect risk of falls    -  [place] fall precautions as indicated by assessment   - Educate patient/family on patient safety including physical limitations  - Instruct patient to call for assistance with activity based on assessment  - Modify environment to reduce risk of injury  - Consider OT/PT consult to assist with strengthening/mobility  Outcome: Progressing  Goal: Maintain or return to baseline ADL function  Description: INTERVENTIONS:  -  Assess patient's ability to carry out ADLs; assess patient's baseline for ADL function and identify physical deficits which impact ability to perform ADLs (bathing, care of mouth/teeth, toileting, grooming, dressing, etc )  - Assess/evaluate cause of self-care deficits   - Assess range of motion  - Assess patient's mobility; develop plan if impaired  - Assess patient's need for assistive devices and provide as appropriate  - Encourage maximum independence but intervene and supervise when necessary  - Involve family in performance of ADLs  - Assess for home care needs following discharge   - Consider OT consult to assist with ADL evaluation and planning for discharge  - Provide patient education as appropriate  Outcome: Progressing  Goal: Maintain or return mobility status to optimal level  Description: INTERVENTIONS:  - Assess patient's baseline mobility status (ambulation, transfers, stairs, etc )    - Identify cognitive and physical deficits and behaviors that affect mobility  - Identify mobility aids required to assist with transfers and/or ambulation (gait belt, sit-to-stand, lift, walker, cane, etc )  - [place] fall precautions as indicated by assessment  - Record patient progress and toleration of activity level on Mobility SBAR; progress patient to next Phase/Stage  - Instruct patient to call for assistance with activity based on assessment  - Consider rehabilitation consult to assist with strengthening/weightbearing, etc   Outcome: Progressing     Problem: DISCHARGE PLANNING  Goal: Discharge to home or other facility with appropriate resources  Description: INTERVENTIONS:  - Identify barriers to discharge w/patient and caregiver  - Arrange for needed discharge resources and transportation as appropriate  - Identify discharge learning needs (meds, wound care, etc )  - Arrange for interpretive services to assist at discharge as needed  - Refer to Case Management Department for coordinating discharge planning if the patient needs post-hospital services based on physician/advanced practitioner order or complex needs related to functional status, cognitive ability, or social support system  Outcome: Progressing     Problem: Knowledge Deficit  Goal: Patient/family/caregiver demonstrates understanding of disease process, treatment plan, medications, and discharge instructions  Description: Complete learning assessment and assess knowledge base    Interventions:  - Provide teaching at level of understanding  - Provide teaching via preferred learning methods  Outcome: Progressing

## 2021-02-13 NOTE — PLAN OF CARE
Problem: Potential for Falls  Goal: Patient will remain free of falls  Description: INTERVENTIONS:  - Assess patient frequently for physical needs  -  Identify cognitive and physical deficits and behaviors that affect risk of falls    -  [place] fall precautions as indicated by assessment   - Educate patient/family on patient safety including physical limitations  - Instruct patient to call for assistance with activity based on assessment  - Modify environment to reduce risk of injury  - Consider OT/PT consult to assist with strengthening/mobility  Outcome: Progressing     Problem: CARDIOVASCULAR - ADULT  Goal: Maintains optimal cardiac output and hemodynamic stability  Description: INTERVENTIONS:  - Monitor I/O, vital signs and rhythm  - Monitor for S/S and trends of decreased cardiac output  - Administer and titrate ordered vasoactive medications to optimize hemodynamic stability  - Assess quality of pulses, skin color and temperature  - Assess for signs of decreased coronary artery perfusion  - Instruct patient to report change in severity of symptoms  Outcome: Progressing     Problem: METABOLIC, FLUID AND ELECTROLYTES - ADULT  Goal: Electrolytes maintained within normal limits  Description: INTERVENTIONS:  - Monitor labs and assess patient for signs and symptoms of electrolyte imbalances  - Administer electrolyte replacement as ordered  - Monitor response to electrolyte replacements, including repeat lab results as appropriate  - Instruct patient on fluid and nutrition as appropriate  Outcome: Progressing  Goal: Fluid balance maintained  Description: INTERVENTIONS:  - Monitor labs   - Monitor I/O and WT  - Instruct patient on fluid and nutrition as appropriate  - Assess for signs & symptoms of volume excess or deficit  Outcome: Progressing     Problem: MUSCULOSKELETAL - ADULT  Goal: Maintain or return mobility to safest level of function  Description: INTERVENTIONS:  - Assess patient's ability to carry out ADLs; assess patient's baseline for ADL function and identify physical deficits which impact ability to perform ADLs (bathing, care of mouth/teeth, toileting, grooming, dressing, etc )  - Assess/evaluate cause of self-care deficits   - Assess range of motion  - Assess patient's mobility  - Assess patient's need for assistive devices and provide as appropriate  - Encourage maximum independence but intervene and supervise when necessary  - Involve family in performance of ADLs  - Assess for home care needs following discharge   - Consider OT consult to assist with ADL evaluation and planning for discharge  - Provide patient education as appropriate  Outcome: Progressing     Problem: Nutrition/Hydration-ADULT  Goal: Nutrient/Hydration intake appropriate for improving, restoring or maintaining nutritional needs  Description: Monitor and assess patient's nutrition/hydration status for malnutrition  Collaborate with interdisciplinary team and initiate plan and interventions as ordered  Monitor patient's weight and dietary intake as ordered or per policy  Utilize nutrition screening tool and intervene as necessary  Determine patient's food preferences and provide high-protein, high-caloric foods as appropriate       INTERVENTIONS:  - Monitor oral intake, urinary output, labs, and treatment plans  - Assess nutrition and hydration status and recommend course of action  - Evaluate amount of meals eaten  - Assist patient with eating if necessary   - Allow adequate time for meals  - Recommend/ encourage appropriate diets, oral nutritional supplements, and vitamin/mineral supplements  - Order, calculate, and assess calorie counts as needed  - Recommend, monitor, and adjust tube feedings and TPN/PPN based on assessed needs  - Assess need for intravenous fluids  - Provide specific nutrition/hydration education as appropriate  - Include patient/family/caregiver in decisions related to nutrition  Outcome: Progressing     Problem: PAIN - ADULT  Goal: Verbalizes/displays adequate comfort level or baseline comfort level  Description: Interventions:  - Encourage patient to monitor pain and request assistance  - Assess pain using appropriate pain scale  - Administer analgesics based on type and severity of pain and evaluate response  - Implement non-pharmacological measures as appropriate and evaluate response  - Consider cultural and social influences on pain and pain management  - Notify physician/advanced practitioner if interventions unsuccessful or patient reports new pain  Outcome: Progressing     Problem: INFECTION - ADULT  Goal: Absence or prevention of progression during hospitalization  Description: INTERVENTIONS:  - Assess and monitor for signs and symptoms of infection  - Monitor lab/diagnostic results  - Monitor all insertion sites, i e  indwelling lines, tubes, and drains  - Monitor endotracheal if appropriate and nasal secretions for changes in amount and color  - [place] appropriate cooling/warming therapies per order  - Administer medications as ordered  - Instruct and encourage patient and family to use good hand hygiene technique  - Identify and instruct in appropriate isolation precautions for identified infection/condition  Outcome: Progressing     Problem: SAFETY ADULT  Goal: Patient will remain free of falls  Description: INTERVENTIONS:  - Assess patient frequently for physical needs  -  Identify cognitive and physical deficits and behaviors that affect risk of falls    -  [place] fall precautions as indicated by assessment   - Educate patient/family on patient safety including physical limitations  - Instruct patient to call for assistance with activity based on assessment  - Modify environment to reduce risk of injury  - Consider OT/PT consult to assist with strengthening/mobility  Outcome: Progressing  Goal: Maintain or return to baseline ADL function  Description: INTERVENTIONS:  -  Assess patient's ability to carry out ADLs; assess patient's baseline for ADL function and identify physical deficits which impact ability to perform ADLs (bathing, care of mouth/teeth, toileting, grooming, dressing, etc )  - Assess/evaluate cause of self-care deficits   - Assess range of motion  - Assess patient's mobility; develop plan if impaired  - Assess patient's need for assistive devices and provide as appropriate  - Encourage maximum independence but intervene and supervise when necessary  - Involve family in performance of ADLs  - Assess for home care needs following discharge   - Consider OT consult to assist with ADL evaluation and planning for discharge  - Provide patient education as appropriate  Outcome: Progressing  Goal: Maintain or return mobility status to optimal level  Description: INTERVENTIONS:  - Assess patient's baseline mobility status (ambulation, transfers, stairs, etc )    - Identify cognitive and physical deficits and behaviors that affect mobility  - Identify mobility aids required to assist with transfers and/or ambulation (gait belt, sit-to-stand, lift, walker, cane, etc )  - [place] fall precautions as indicated by assessment  - Record patient progress and toleration of activity level on Mobility SBAR; progress patient to next Phase/Stage  - Instruct patient to call for assistance with activity based on assessment  - Consider rehabilitation consult to assist with strengthening/weightbearing, etc   Outcome: Progressing     Problem: DISCHARGE PLANNING  Goal: Discharge to home or other facility with appropriate resources  Description: INTERVENTIONS:  - Identify barriers to discharge w/patient and caregiver  - Arrange for needed discharge resources and transportation as appropriate  - Identify discharge learning needs (meds, wound care, etc )  - Arrange for interpretive services to assist at discharge as needed  - Refer to Case Management Department for coordinating discharge planning if the patient needs post-hospital services based on physician/advanced practitioner order or complex needs related to functional status, cognitive ability, or social support system  Outcome: Progressing     Problem: Knowledge Deficit  Goal: Patient/family/caregiver demonstrates understanding of disease process, treatment plan, medications, and discharge instructions  Description: Complete learning assessment and assess knowledge base    Interventions:  - Provide teaching at level of understanding  - Provide teaching via preferred learning methods  Outcome: Progressing

## 2021-02-13 NOTE — PROGRESS NOTES
NEPHROLOGY PROGRESS NOTE   Oliver Sharpe 68 y o  male MRN: [id number]  Unit/Bed#: [facility] 806-01 Encounter: [id number]      ASSESSMENT & PLAN:  1  ESRD, initially started on PD  Patient now complaining of severe pain during PD over the last couple of nights  Have a long discussion with patient as well as with his daughter Canelo Ham over the phone  Unfortunately now patient wont have support at home to continue with PD  Patient and his daughter request to be switched to intermittent hemodialysis  Patient and his daughter do not want to continue with PD  I have spoke with   I have spoke with primary team   I have spoke with IR  IR consult for perm catheter placement, PD catheter can be removed after  Plan to start dialysis on Monday,  to arrange for outpatient dialysis  I explained to patient and his daughter that we need to wait for a chair and time at outpatient dialysis unit before he can be discharged, they expressed understanding  Save nondominant arm (left arm) for permanent dialysis access in the future    2  Acute on chronic diastolic heart failure, hold PD for now  Started him on Monday, will continue with UF tolerated  Continue with diuretics    3  Hypertension, blood pressure is stable  4  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    5  Anemia chronic kidney disease, monitor H&H    My plan and recommendation were discussed with Internal Medicine attending  I have also discussed with interventional radiologist on-call  Discussed with  to arrange for outpatient dialysis      SUBJECTIVE:  Patient seen and examined, complaining of severe abdominal pain during PD for the last couple of nights    Currently feeling okay, denies any chest pain, no shortness of breast    OBJECTIVE:  Current Weight: Weight - Scale: 76 7 kg (169 lb 1 5 oz)  Vitals:    02/13/21 1548   BP: 127/79   Pulse: 91   Resp:    Temp: 98 1 °F (36 7 °C)   SpO2: 98% Intake/Output Summary (Last 24 hours) at 2/13/2021 1618  Last data filed at 2/13/2021 1200  Gross per 24 hour   Intake 540 ml   Output 1100 ml   Net -560 ml     General: conscious, cooperative, in not acute distress  Eyes: conjunctivae pale, anicteric sclerae  ENT: lips and mucous membranes moist  Neck: supple, no JVD  Chest: clear breath sounds bilateral, no crackles, ronchus or wheezings  CVS: distinct S1 & S2, normal rate, regular rhythm  Abdomen: soft, non-tender, non-distended, normoactive bowel sounds  Extremities: no significant edema of both legs  Skin: no rash  Neuro: awake, alert, oriented      Medications:    Current Facility-Administered Medications:     acetaminophen (TYLENOL) tablet 650 mg, 650 mg, Oral, Q6H PRN, EL Roth    atorvastatin (LIPITOR) tablet 40 mg, 40 mg, Oral, Daily, EL Roth, 40 mg at 02/13/21 0947    benzonatate (TESSALON PERLES) capsule 100 mg, 100 mg, Oral, TID PRN, Brittany Sears PA-C, 100 mg at 02/10/21 2301    bumetanide (BUMEX) tablet 4 mg, 4 mg, Oral, BID, Jet Andre, DO, 4 mg at 02/12/21 1730    guaiFENesin (MUCINEX) 12 hr tablet 600 mg, 600 mg, Oral, Q12H [address], Panchito Santos PA-C, 600 mg at 02/13/21 0948    HYDROmorphone (DILAUDID) injection 0 2 mg, 0 2 mg, Intravenous, Q6H PRN, Panchito Santos PA-C    lidocaine 1% buffered, , Infiltration, Code/Trauma/Sedation Med, Esha Bowles MD, 20 mL at 02/09/21 1402    melatonin tablet 6 mg, 6 mg, Oral, HS, EL Roth, 6 mg at 02/12/21 2222    ondansetron (ZOFRAN) injection 4 mg, 4 mg, Intravenous, Q6H PRN, EL Roth    oxyCODONE (ROXICODONE) immediate release tablet 10 mg, 10 mg, Oral, Q4H PRN, Esha Bowles MD, 10 mg at 02/12/21 1731    oxyCODONE (ROXICODONE) IR tablet 5 mg, 5 mg, Oral, Q4H PRN, Esha Bowles MD    polyethylene glycol [facility]) packet 17 g, 17 g, Oral, Daily, Maryse Flores DO, 17 g at 02/12/21 1209    senna-docusate sodium (SENOKOT S) 8 6-50 mg per tablet 1 tablet, 1 tablet, Oral, BID, Jean Carlos Patrick, DO, 1 tablet at 02/12/21 1730    sevelamer (RENAGEL) tablet 800 mg, 800 mg, Oral, TID With Meals, Clearnce Starring Torres, DO, 800 mg at 02/13/21 1157    Insert peripheral IV, , , Once **AND** sodium chloride (PF) 0 9 % injection 3 mL, 3 mL, Intravenous, Q1H PRN, EL Roth    traZODone (DESYREL) tablet 25 mg, 25 mg, Oral, HS, EL Roth, 25 mg at 02/12/21 2223    warfarin (COUMADIN) tablet 3 mg, 3 mg, Oral, Daily (warfarin), Jean Carlos Patrick, DO, 3 mg at 02/12/21 1730    Invasive Devices:        Lab Results:   Results from last 7 days   Lab Units 02/13/21  0455 02/11/21  0514 02/09/21  1744   WBC Thousand/uL  --  8 74  --    HEMOGLOBIN g/dL  --  8 6*  --    HEMATOCRIT %  --  27 8*  --    PLATELETS Thousands/uL  --  100*  --    SODIUM mmol/L 142 140 142   POTASSIUM mmol/L 3 9 4 2 4 1   CHLORIDE mmol/L 108 103 107   CO2 mmol/L 31 33* 30   BUN mg/dL 98* 97* 91*   CREATININE mg/dL 3 39* 3 76* 3 54*   CALCIUM mg/dL 8 5 8 6 9 2       Previous work up:  See previous notes      Portions of the record may have been created with voice recognition software  Occasional wrong word or "sound a like" substitutions may have occurred due to the inherent limitations of voice recognition software  Read the chart carefully and recognize, using context, where substitutions have occurred  If you have any questions, please contact the dictating provider

## 2021-02-13 NOTE — CASE MANAGEMENT
Dr Charles Ace notified CM that the family member who was going to assist the patient is no longer available  Pt now wants HD and will need a new chair time  Cm called [facility] there hours are Monday through Friday 8-5 pm  Cm will need to reach out tot them on Monday  Pt will now need a new chair time at [facility]  Called [address] and confirmed patient does not already have a chair time currently

## 2021-02-13 NOTE — PROGRESS NOTES
Progress Note - Oliver Sharpe 1940, 68 y o  male MRN: [id number]    Unit/Bed#: [facility] 806-01 Encounter: [id number]    Primary Care Provider: EL Jiménez   Date and time admitted to hospital: 1/26/2021  1:24 PM        * Acute on chronic diastolic (congestive) heart failure ([facility] [address] )  Assessment & Plan  · Presented with 15 lb weight gain in one month, GONZALES, worsening lower extremity edema  Outpatient cardiology team ordered extra dose of diuretic however no improvement  Patient was noted to have AFSHIN on outpatient labs and referred to ED  · Outpatient diuretic regimen bumex 4 mg PO BID with k 20 BID and metolazone 5 mg  2 x weekly  · Heart failure following, was on Bumex drip for several days and now transitioned to PO Bumex  · Last echo 10/2020 showed preserved EF  Mild MR  Severe TR    · I/O, daily weights, low NA diet with FR  · Patient had PD cath placed 2/3; had initial cath removed and a new placement 2/9  · Started on PD on 02/10  · Stable    ESRD (end stage renal disease) [facility])  Assessment & Plan  Lab Results   Component Value Date    EGFR 16 02/13/2021    EGFR 14 02/11/2021    EGFR 16 02/09/2021    CREATININE 3 39 (H) 02/13/2021    CREATININE 3 76 (H) 02/11/2021    CREATININE 3 54 (H) 02/09/2021     · Baseline creatinine 2-2 9 approximately  4 19 on presentation, now ESRD  · Holding norvasc 2/2 soft BP  · Known to Dr Irene Paez as outpatient-was on HD in past  · Peritoneal dialysis per nephrology   · Started on PD  · Patient complaining of severe bladder pain during dialysis  · PD was held yesterday, awaiting further Nephrology input    [facility]  Assessment & Plan  · Rate controlled on Lopressor  · Anticoagulated on Coumadin   · Patient takes 2 mg daily at home    · Monitor INR    Anemia  Assessment & Plan  · Multifactorial in setting of CKD  · Monitor    Dissection of thoracic aorta [facility])  Assessment & Plan  · 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  · 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  Risk associated with surgical options therefore recommended continued observation  Bradycardia  Assessment & Plan  Also with 2 second pause noted earlier in hospital stay  · Lopressor held  · Asymptomatic  · Appreciate cardiology recommendations, possible Zio patch as outpatient     Benign essential hypertension  Assessment & Plan  · Chronic; history of HTN  · Blood pressures have been labile  · Norvasc and Lopressor on hold  · Monitor vital signs  VTE Pharmacologic Prophylaxis:   Pharmacologic: Warfarin (Coumadin)  Mechanical VTE Prophylaxis in Place: Yes    Patient Centered Rounds: I have performed bedside rounds with nursing staff today  Discussions with Specialists or Other Care Team Provider:     Education and Discussions with Family / Patient:  Patient    Time Spent for Care: 45 minutes  More than 50% of total time spent on counseling and coordination of care as described above  Current Length of Stay: 18 day(s)    Current Patient Status: Inpatient   Certification Statement: The patient will continue to require additional inpatient hospital stay due to Above    Discharge Plan / Estimated Discharge Date:  Home when cleared by Nephrology    Code Status: Level 1 - Full Code      Subjective:   Patient seen and examined  Last night event reviewed  Patient complaining of severe bladder pain during peritoneal dialysis  No chest pain or shortness of breath    Objective:     Vitals:   Temp (24hrs), Av 3 °F (36 8 °C), Min:98 3 °F (36 8 °C), Max:98 3 °F (36 8 °C)    Temp:  [98 3 °F (36 8 °C)] 98 3 °F (36 8 °C)  HR:  [62-78] 62  Resp:  [16-20] 20  BP: (101-131)/(61-68) 101/63  SpO2:  [96 %-99 %] 96 %  Body mass index is 26 48 kg/m²  Input and Output Summary (last 24 hours):        Intake/Output Summary (Last 24 hours) at 2021 1151  Last data filed at 2021 9464  Gross per 24 hour   Intake 200 ml   Output 1250 ml   Net -1050 ml       Physical Exam:     Physical Exam  Patient is awake alert oriented no acute distress  Lung clear to auscultation bilateral  Heart positive S1-S2 no murmur  Abdomen soft nontender, PD catheter in place  Lower extremities no edema    Additional Data:     Labs:    Results from last 7 days   Lab Units 02/11/21  0514   WBC Thousand/uL 8 74   HEMOGLOBIN g/dL 8 6*   HEMATOCRIT % 27 8*   PLATELETS Thousands/uL 100*     Results from last 7 days   Lab Units 02/13/21  0455   POTASSIUM mmol/L 3 9   CHLORIDE mmol/L 108   CO2 mmol/L 31   BUN mg/dL 98*   CREATININE mg/dL 3 39*   CALCIUM mg/dL 8 5     Results from last 7 days   Lab Units 02/12/21  0450   INR  1 85*       * I Have Reviewed All Lab Data Listed Above  * Additional Pertinent Lab Tests Reviewed:  [facility] Admission Reviewed    Imaging:    Imaging Reports Reviewed Today Include:   Imaging Personally Reviewed by Myself Includes:     Recent Cultures (last 7 days):           Last 24 Hours Medication List:   Current Facility-Administered Medications   Medication Dose Route Frequency Provider Last Rate    acetaminophen  650 mg Oral Q6H PRN EL Bonds      atorvastatin  40 mg Oral Daily EL Bonds      benzonatate  100 mg Oral TID PRN Brittany Sears PA-C      bumetanide  4 mg Oral BID Lilo Dodd DO      guaiFENesin  600 mg Oral Q12H [address] Panchito Santos PA-C      HYDROmorphone  0 2 mg Intravenous Q6H PRN Pop Khan PA-C      lidocaine 1% buffered   Infiltration Code/Trauma/Sedation Med Yesenia Pinzon MD      melatonin  6 mg Oral HS EL Roth      ondansetron  4 mg Intravenous Q6H PRN EL Oquendo      oxyCODONE  10 mg Oral Q4H PRN Yesenia Pinzon MD      oxyCODONE  5 mg Oral Q4H PRN Yesenia Pinzon MD      polyethylene glycol  17 g Oral Daily Dykes Butts, DO      senna-docusate sodium  1 tablet Oral BID Dykes Butts, DO      sevelamer  800 mg Oral TID With Meals Snehla Torres DO      sodium chloride (PF)  3 mL Intravenous Q1H PRN EL Roth      traZODone  25 mg Oral HS EL Roth      warfarin  3 mg Oral Daily (warfarin) Shannon Newby DO          Today, Patient Was Seen By: Shannon Newby DO    ** Please Note: This note has been constructed using a voice recognition system   **

## 2021-02-13 NOTE — ASSESSMENT & PLAN NOTE
Lab Results   Component Value Date    EGFR 16 02/13/2021    EGFR 14 02/11/2021    EGFR 16 02/09/2021    CREATININE 3 39 (H) 02/13/2021    CREATININE 3 76 (H) 02/11/2021    CREATININE 3 54 (H) 02/09/2021     · Baseline creatinine 2-2 9 approximately    4 19 on presentation, now ESRD  · Holding norvasc 2/2 soft BP  · Known to Dr Cari Burkett as outpatient-was on HD in past  · Peritoneal dialysis per nephrology   · Started on PD  · Patient complaining of severe bladder pain during dialysis  · PD was held yesterday, awaiting further Nephrology input

## 2021-02-13 NOTE — CONSULTS
INTERPROFESSIONAL (PHONE) [address] [facility] Annemarie 68 y o  male MRN: [id number]  [facility] Encounter: [id number]    IR has been consulted to evaluate the patient, determine the appropriate procedure, and whether or not a procedure can and should be performed regarding the care of Oliver Sharpe  We were consulted by Dr Demetria John concerning ESRD, and to possibly perform a hemodialysis catheter placement if medically appropriate for the patient and removal of the PD catheter  IP Consult to IR  Consult performed by: Royce Saleem MD  Consult ordered by: Sugey Arcos MD        02/13/21      Assessment/Recommendation:   PD catheter placed late last week and worked well but patient complained of bladder pain during exchanges and understands that this pain will subside very soon  The bigger issue is that he was supposed to have a caregiver help perform PD at home but will no longer have that persons help and must now switch to hemodialysis  IR to place hemodialysis catheter on Monday and remove PD catheter on Tuesday with anesthesia  Total time spent in review of data, discussion with requesting provider and rendering advice was 30 minutes  Patient or appropriate family member was verbally informed by Dr Demetria John of this consultative service on their behalf to provide more timely access to specialty care in lieu of an in person consultation  Verbal consent was obtained  Thank you for allowing Interventional Radiology to participate in the care of [address]  Please don't hesitate to call or TigerText us with any questions       Royce Saleem MD

## 2021-02-14 LAB
ERYTHROCYTE [DISTWIDTH] IN BLOOD BY AUTOMATED COUNT: 15.9 % (ref 11.6–15.1)
HCT VFR BLD AUTO: 26 % (ref 36.5–49.3)
HGB BLD-MCNC: 8 G/DL (ref 12–17)
INR PPP: 2.26 (ref 0.84–1.19)
MCH RBC QN AUTO: 30.4 PG (ref 26.8–34.3)
MCHC RBC AUTO-ENTMCNC: 30.8 G/DL (ref 31.4–37.4)
MCV RBC AUTO: 99 FL (ref 82–98)
PLATELET # BLD AUTO: 100 THOUSANDS/UL (ref 149–390)
PMV BLD AUTO: 10.8 FL (ref 8.9–12.7)
PROTHROMBIN TIME: 24.8 SECONDS (ref 11.6–14.5)
RBC # BLD AUTO: 2.63 MILLION/UL (ref 3.88–5.62)
WBC # BLD AUTO: 4.68 THOUSAND/UL (ref 4.31–10.16)

## 2021-02-14 PROCEDURE — 99232 SBSQ HOSP IP/OBS MODERATE 35: CPT | Performed by: INTERNAL MEDICINE

## 2021-02-14 PROCEDURE — 85027 COMPLETE CBC AUTOMATED: CPT | Performed by: INTERNAL MEDICINE

## 2021-02-14 PROCEDURE — 85610 PROTHROMBIN TIME: CPT | Performed by: INTERNAL MEDICINE

## 2021-02-14 RX ADMIN — SEVELAMER HYDROCHLORIDE 800 MG: 800 TABLET, FILM COATED PARENTERAL at 08:41

## 2021-02-14 RX ADMIN — GUAIFENESIN 600 MG: 600 TABLET, EXTENDED RELEASE ORAL at 21:46

## 2021-02-14 RX ADMIN — SENNOSIDES AND DOCUSATE SODIUM 1 TABLET: 8.6; 5 TABLET ORAL at 17:38

## 2021-02-14 RX ADMIN — BUMETANIDE 4 MG: 1 TABLET ORAL at 08:41

## 2021-02-14 RX ADMIN — POLYETHYLENE GLYCOL 3350 17 G: 17 POWDER, FOR SOLUTION ORAL at 08:41

## 2021-02-14 RX ADMIN — ATORVASTATIN CALCIUM 40 MG: 40 TABLET, FILM COATED ORAL at 08:41

## 2021-02-14 RX ADMIN — MELATONIN TAB 3 MG 6 MG: 3 TAB at 21:46

## 2021-02-14 RX ADMIN — TRAZODONE HYDROCHLORIDE 25 MG: 50 TABLET ORAL at 21:45

## 2021-02-14 RX ADMIN — GUAIFENESIN 600 MG: 600 TABLET, EXTENDED RELEASE ORAL at 08:41

## 2021-02-14 RX ADMIN — SENNOSIDES AND DOCUSATE SODIUM 1 TABLET: 8.6; 5 TABLET ORAL at 08:41

## 2021-02-14 RX ADMIN — SEVELAMER HYDROCHLORIDE 800 MG: 800 TABLET, FILM COATED PARENTERAL at 12:18

## 2021-02-14 RX ADMIN — BUMETANIDE 4 MG: 1 TABLET ORAL at 17:42

## 2021-02-14 RX ADMIN — SEVELAMER HYDROCHLORIDE 800 MG: 800 TABLET, FILM COATED PARENTERAL at 17:38

## 2021-02-14 NOTE — ASSESSMENT & PLAN NOTE
· Rate controlled on Lopressor  · Anticoagulated on Coumadin   · Patient takes 2 mg daily at home    · Monitor INR  · Hold Coumadin today for PD catheter removal and placement of HD catheter

## 2021-02-14 NOTE — ASSESSMENT & PLAN NOTE
Lab Results   Component Value Date    EGFR 16 02/13/2021    EGFR 14 02/11/2021    EGFR 16 02/09/2021    CREATININE 3 39 (H) 02/13/2021    CREATININE 3 76 (H) 02/11/2021    CREATININE 3 54 (H) 02/09/2021     · Baseline creatinine 2-2 9 approximately    4 19 on presentation, now ESRD  · Holding norvasc 2/2 soft BP  · Known to Dr Levon Sgae as outpatient-was on HD in past  · Peritoneal dialysis per nephrology   · Started on PD however patient started to severe bladder pain during dialysis  · Patient will be switched to hemodialysis as noted on nephrology note  · IR consulted for HD catheter and removal of PD catheter

## 2021-02-14 NOTE — PROGRESS NOTES
NEPHROLOGY PROGRESS NOTE   Oliver Sharpe 68 y o  male MRN: [id number]  Unit/Bed#: [facility] 806-01 Encounter: [id number]      ASSESSMENT & PLAN:  1  ESRD, initially started on PD  Patient and his daughter opted to stop PD secondary to not good support at home as well as abdominal pain  Both are interesting on switched to HD  IR was consulted for PermCath placement as well as PD catheter removal   Plan to start dialysis tomorrow once PermCath is placed   was contacted yesterday to arrange for outpatient dialysis at [address]  Save nondominant arm (left arm) for permanent dialysis access in the future    2  Acute on chronic diastolic heart failure, PD on hold  Plan to start dialysis tomorrow, continue with UF as tolerated, dry weight to be determined  Continue with diuretics    3  Hypertension, blood pressure is stable  4  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    5  Anemia chronic kidney disease, monitor H&H    My plan and recommendation were discussed with Internal Medicine attending  SUBJECTIVE:  Patient seen and examined, denies any chest pain, no shortness of breath, abdominal pain, no nausea, no vomiting        OBJECTIVE:  Current Weight: Weight - Scale: 77 2 kg (170 lb 4 8 oz)  Vitals:    02/14/21 0730   BP: 116/66   Pulse: 61   Resp: 18   Temp: 98 5 °F (36 9 °C)   SpO2: 98%       Intake/Output Summary (Last 24 hours) at 2/14/2021 1402  Last data filed at 2/14/2021 0442  Gross per 24 hour   Intake 180 ml   Output 1300 ml   Net -1120 ml     General: conscious, cooperative, in not acute distress  Eyes: conjunctivae pale, anicteric sclerae  ENT: lips and mucous membranes moist  Neck: supple, no JVD  Chest: clear breath sounds bilateral, no crackles, ronchus or wheezings  CVS: distinct S1 & S2, normal rate, regular rhythm  Abdomen: soft, non-tender, non-distended, normoactive bowel sounds, PD catheter in place  Extremities: no significant edema of both legs  Skin: no rash  Neuro: awake, alert, oriented          Medications:    Current Facility-Administered Medications:     acetaminophen (TYLENOL) tablet 650 mg, 650 mg, Oral, Q6H PRN, EL Roth    atorvastatin (LIPITOR) tablet 40 mg, 40 mg, Oral, Daily, EL Roth, 40 mg at 02/14/21 0841    benzonatate (TESSALON PERLES) capsule 100 mg, 100 mg, Oral, TID PRN, Brittany Sears PA-C, 100 mg at 02/10/21 2301    bumetanide (BUMEX) tablet 4 mg, 4 mg, Oral, BID, Jet Andre, DO, 4 mg at 02/14/21 0841    guaiFENesin (MUCINEX) 12 hr tablet 600 mg, 600 mg, Oral, Q12H [address], Panchito Santos PA-C, 600 mg at 02/14/21 0841    HYDROmorphone (DILAUDID) injection 0 2 mg, 0 2 mg, Intravenous, Q6H PRN, Panchito Santos PA-C    lidocaine 1% buffered, , Infiltration, Code/Trauma/Sedation Med, Valeria Deleon MD, 20 mL at 02/09/21 1402    melatonin tablet 6 mg, 6 mg, Oral, HS, EL Roth, 6 mg at 02/13/21 2145    ondansetron (ZOFRAN) injection 4 mg, 4 mg, Intravenous, Q6H PRN, EL Roth    oxyCODONE (ROXICODONE) immediate release tablet 10 mg, 10 mg, Oral, Q4H PRN, Valeria Deleon MD, 10 mg at 02/12/21 1731    oxyCODONE (ROXICODONE) IR tablet 5 mg, 5 mg, Oral, Q4H PRN, Valeria Deleon MD    polyethylene glycol (MIRALAX) packet 17 g, 17 g, Oral, Daily, Dania Marie DO, 17 g at 02/14/21 0841    senna-docusate sodium (SENOKOT S) 8 6-50 mg per tablet 1 tablet, 1 tablet, Oral, BID, Dania Marie DO, 1 tablet at 02/14/21 0841    sevelamer (RENAGEL) tablet 800 mg, 800 mg, Oral, TID With Meals, Rodney Torres DO, 800 mg at 02/14/21 1218    Insert peripheral IV, , , Once **AND** sodium chloride (PF) 0 9 % injection 3 mL, 3 mL, Intravenous, Q1H PRN, EL Roth    traZODone (DESYREL) tablet 25 mg, 25 mg, Oral, HS, EL Roth, 25 mg at 02/13/21 5628    Invasive Devices:        Lab Results:   Results from last 7 days   Lab Units 02/14/21  4714 02/13/21  0455 02/11/21  0514 02/09/21  1744   WBC Thousand/uL 4 68  --  8 74  --    HEMOGLOBIN g/dL 8 0*  --  8 6*  --    HEMATOCRIT % 26 0*  --  27 8*  --    PLATELETS Thousands/uL 100*  --  100*  --    SODIUM mmol/L  --  142 140 142   POTASSIUM mmol/L  --  3 9 4 2 4 1   CHLORIDE mmol/L  --  108 103 107   CO2 mmol/L  --  31 33* 30   BUN mg/dL  --  98* 97* 91*   CREATININE mg/dL  --  3 39* 3 76* 3 54*   CALCIUM mg/dL  --  8 5 8 6 9 2     Portions of the record may have been created with voice recognition software  Occasional wrong word or "sound a like" substitutions may have occurred due to the inherent limitations of voice recognition software  Read the chart carefully and recognize, using context, where substitutions have occurred  If you have any questions, please contact the dictating provider

## 2021-02-14 NOTE — PROGRESS NOTES
Progress Note - Oliver Sharpe 1940, 68 y o  male MRN: [id number]    Unit/Bed#: [facility] 806-01 Encounter: [id number]    Primary Care Provider: EL Monte   Date and time admitted to hospital: 1/26/2021  1:24 PM        * Acute on chronic diastolic (congestive) heart failure ([facility] [address] )  Assessment & Plan  · Presented with 15 lb weight gain in one month, GONZALES, worsening lower extremity edema  Outpatient cardiology team ordered extra dose of diuretic however no improvement  Patient was noted to have AFSHIN on outpatient labs and referred to ED  · Outpatient diuretic regimen bumex 4 mg PO BID with k 20 BID and metolazone 5 mg  2 x weekly  · Heart failure following, was on Bumex drip for several days and now transitioned to PO Bumex  · Last echo 10/2020 showed preserved EF  Mild MR  Severe TR    · I/O, daily weights, low NA diet with FR  · Patient had PD cath placed 2/3; had initial cath removed and a new placement 2/9  · Started on PD on 02/10  · Stable    ESRD (end stage renal disease) [facility])  Assessment & Plan  Lab Results   Component Value Date    EGFR 16 02/13/2021    EGFR 14 02/11/2021    EGFR 16 02/09/2021    CREATININE 3 39 (H) 02/13/2021    CREATININE 3 76 (H) 02/11/2021    CREATININE 3 54 (H) 02/09/2021     · Baseline creatinine 2-2 9 approximately  4 19 on presentation, now ESRD  · Holding norvasc 2/2 soft BP  · Known to Dr Kathy Heart as outpatient-was on HD in past  · Peritoneal dialysis per nephrology   · Started on PD however patient started to severe bladder pain during dialysis  · Patient will be switched to hemodialysis as noted on nephrology note  · IR consulted for HD catheter and removal of PD catheter    Atrial fibrillation ([facility] )  Assessment & Plan  · Rate controlled on Lopressor  · Anticoagulated on Coumadin   · Patient takes 2 mg daily at home    · Monitor INR  · Hold Coumadin today for PD catheter removal and placement of HD catheter    Anemia  Assessment & Plan  · Multifactorial in setting of CKD  · Monitor    Dissection of thoracic aorta [facility])  Assessment & Plan  · 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  · 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  Risk associated with surgical options therefore recommended continued observation  Bradycardia  Assessment & Plan  Also with 2 second pause noted earlier in hospital stay  · Lopressor held  · Asymptomatic  · Appreciate cardiology recommendations, possible Zio patch as outpatient     Benign essential hypertension  Assessment & Plan  · Chronic; history of HTN  · Blood pressures have been labile  · Norvasc and Lopressor on hold  · Monitor vital signs  VTE Pharmacologic Prophylaxis:   Pharmacologic: Warfarin (Coumadin)  Mechanical VTE Prophylaxis in Place: Yes    Patient Centered Rounds: I have performed bedside rounds with nursing staff today  Discussions with Specialists or Other Care Team Provider:     Education and Discussions with Family / Patient:  Patient    Time Spent for Care: 30 minutes  More than 50% of total time spent on counseling and coordination of care as described above  Current Length of Stay: 19 day(s)    Current Patient Status: Inpatient   Certification Statement: The patient will continue to require additional inpatient hospital stay due to Above    Discharge Plan / Estimated Discharge Date: TBD    Code Status: Level 1 - Full Code      Subjective:   Patient seen and examined  Comfortable in bed  No event overnight  Decision was made to change from PD to hemodialysis    Objective:     Vitals:   Temp (24hrs), Av 3 °F (36 8 °C), Min:98 1 °F (36 7 °C), Max:98 5 °F (36 9 °C)    Temp:  [98 1 °F (36 7 °C)-98 5 °F (36 9 °C)] 98 5 °F (36 9 °C)  HR:  [61-91] 61  Resp:  [17-18] 18  BP: (116-127)/(66-79) 116/66  SpO2:  [98 %] 98 %  Body mass index is 26 67 kg/m²  Input and Output Summary (last 24 hours):        Intake/Output Summary (Last 24 hours) at 2/14/2021 1122  Last data filed at 2/14/2021 0442  Gross per 24 hour   Intake 720 ml   Output 1300 ml   Net -580 ml       Physical Exam:     Physical Exam     Patient is awake alert oriented no acute distress  Lung clear to auscultation bilateral  Heart positive S1-S2 no murmur  Abdomen soft nontender, PD catheter in place  Lower extremities no edema    Additional Data:     Labs:    Results from last 7 days   Lab Units 02/14/21  0441   WBC Thousand/uL 4 68   HEMOGLOBIN g/dL 8 0*   HEMATOCRIT % 26 0*   PLATELETS Thousands/uL 100*     Results from last 7 days   Lab Units 02/13/21  0455   POTASSIUM mmol/L 3 9   CHLORIDE mmol/L 108   CO2 mmol/L 31   BUN mg/dL 98*   CREATININE mg/dL 3 39*   CALCIUM mg/dL 8 5     Results from last 7 days   Lab Units 02/14/21  0441   INR  2 26*       * I Have Reviewed All Lab Data Listed Above  * Additional Pertinent Lab Tests Reviewed:  Lucila Ybarra Admission Reviewed    Imaging:    Imaging Reports Reviewed Today Include:   Imaging Personally Reviewed by Myself Includes:     Recent Cultures (last 7 days):           Last 24 Hours Medication List:   Current Facility-Administered Medications   Medication Dose Route Frequency Provider Last Rate    acetaminophen  650 mg Oral Q6H PRN EL Bojorquez      atorvastatin  40 mg Oral Daily EL Bojorquez      benzonatate  100 mg Oral TID PRN Brittany Sears PA-C      bumetanide  4 mg Oral BID Desi Elaine DO      guaiFENesin  600 mg Oral Q12H [address] [place] CHIDI Santos PA-C      HYDROmorphone  0 2 mg Intravenous Q6H PRN TRINY Partida      lidocaine 1% buffered   Infiltration Code/Trauma/Sedation Med Leanne Valenzuela MD      melatonin  6 mg Oral HS EL Roth      ondansetron  4 mg Intravenous Q6H PRN EL Galindo      oxyCODONE  10 mg Oral Q4H PRN Leanne Valenzuela MD      oxyCODONE  5 mg Oral Q4H PRN Leanne Valenzuela MD      polyethylene glycol  17 g Oral Daily Shannon Newby DO      senna-docusate sodium  1 tablet Oral BID Shannon Newby DO      sevelamer  800 mg Oral TID With Meals Snehal Torres DO      sodium chloride (PF)  3 mL Intravenous Q1H PRN EL Ferrer      traZODone  25 mg Oral HS EL Ferrer          Today, Patient Was Seen By: Shannon Newby DO    ** Please Note: This note has been constructed using a voice recognition system   **

## 2021-02-14 NOTE — PLAN OF CARE
Problem: Potential for Falls  Goal: Patient will remain free of falls  Description: INTERVENTIONS:  - Assess patient frequently for physical needs  -  Identify cognitive and physical deficits and behaviors that affect risk of falls    -  [place] fall precautions as indicated by assessment   - Educate patient/family on patient safety including physical limitations  - Instruct patient to call for assistance with activity based on assessment  - Modify environment to reduce risk of injury  - Consider OT/PT consult to assist with strengthening/mobility  Outcome: Progressing     Problem: CARDIOVASCULAR - ADULT  Goal: Maintains optimal cardiac output and hemodynamic stability  Description: INTERVENTIONS:  - Monitor I/O, vital signs and rhythm  - Monitor for S/S and trends of decreased cardiac output  - Administer and titrate ordered vasoactive medications to optimize hemodynamic stability  - Assess quality of pulses, skin color and temperature  - Assess for signs of decreased coronary artery perfusion  - Instruct patient to report change in severity of symptoms  Outcome: Progressing     Problem: METABOLIC, FLUID AND ELECTROLYTES - ADULT  Goal: Electrolytes maintained within normal limits  Description: INTERVENTIONS:  - Monitor labs and assess patient for signs and symptoms of electrolyte imbalances  - Administer electrolyte replacement as ordered  - Monitor response to electrolyte replacements, including repeat lab results as appropriate  - Instruct patient on fluid and nutrition as appropriate  Outcome: Progressing  Goal: Fluid balance maintained  Description: INTERVENTIONS:  - Monitor labs   - Monitor I/O and WT  - Instruct patient on fluid and nutrition as appropriate  - Assess for signs & symptoms of volume excess or deficit  Outcome: Progressing     Problem: MUSCULOSKELETAL - ADULT  Goal: Maintain or return mobility to safest level of function  Description: INTERVENTIONS:  - Assess patient's ability to carry out ADLs; assess patient's baseline for ADL function and identify physical deficits which impact ability to perform ADLs (bathing, care of mouth/teeth, toileting, grooming, dressing, etc )  - Assess/evaluate cause of self-care deficits   - Assess range of motion  - Assess patient's mobility  - Assess patient's need for assistive devices and provide as appropriate  - Encourage maximum independence but intervene and supervise when necessary  - Involve family in performance of ADLs  - Assess for home care needs following discharge   - Consider OT consult to assist with ADL evaluation and planning for discharge  - Provide patient education as appropriate  Outcome: Progressing     Problem: Nutrition/Hydration-ADULT  Goal: Nutrient/Hydration intake appropriate for improving, restoring or maintaining nutritional needs  Description: Monitor and assess patient's nutrition/hydration status for malnutrition  Collaborate with interdisciplinary team and initiate plan and interventions as ordered  Monitor patient's weight and dietary intake as ordered or per policy  Utilize nutrition screening tool and intervene as necessary  Determine patient's food preferences and provide high-protein, high-caloric foods as appropriate       INTERVENTIONS:  - Monitor oral intake, urinary output, labs, and treatment plans  - Assess nutrition and hydration status and recommend course of action  - Evaluate amount of meals eaten  - Assist patient with eating if necessary   - Allow adequate time for meals  - Recommend/ encourage appropriate diets, oral nutritional supplements, and vitamin/mineral supplements  - Order, calculate, and assess calorie counts as needed  - Recommend, monitor, and adjust tube feedings and TPN/PPN based on assessed needs  - Assess need for intravenous fluids  - Provide specific nutrition/hydration education as appropriate  - Include patient/family/caregiver in decisions related to nutrition  Outcome: Progressing     Problem: PAIN - ADULT  Goal: Verbalizes/displays adequate comfort level or baseline comfort level  Description: Interventions:  - Encourage patient to monitor pain and request assistance  - Assess pain using appropriate pain scale  - Administer analgesics based on type and severity of pain and evaluate response  - Implement non-pharmacological measures as appropriate and evaluate response  - Consider cultural and social influences on pain and pain management  - Notify physician/advanced practitioner if interventions unsuccessful or patient reports new pain  Outcome: Progressing     Problem: INFECTION - ADULT  Goal: Absence or prevention of progression during hospitalization  Description: INTERVENTIONS:  - Assess and monitor for signs and symptoms of infection  - Monitor lab/diagnostic results  - Monitor all insertion sites, i e  indwelling lines, tubes, and drains  - Monitor endotracheal if appropriate and nasal secretions for changes in amount and color  - [place] appropriate cooling/warming therapies per order  - Administer medications as ordered  - Instruct and encourage patient and family to use good hand hygiene technique  - Identify and instruct in appropriate isolation precautions for identified infection/condition  Outcome: Progressing     Problem: SAFETY ADULT  Goal: Patient will remain free of falls  Description: INTERVENTIONS:  - Assess patient frequently for physical needs  -  Identify cognitive and physical deficits and behaviors that affect risk of falls    -  [place] fall precautions as indicated by assessment   - Educate patient/family on patient safety including physical limitations  - Instruct patient to call for assistance with activity based on assessment  - Modify environment to reduce risk of injury  - Consider OT/PT consult to assist with strengthening/mobility  Outcome: Progressing  Goal: Maintain or return to baseline ADL function  Description: INTERVENTIONS:  -  Assess patient's ability to carry out ADLs; assess patient's baseline for ADL function and identify physical deficits which impact ability to perform ADLs (bathing, care of mouth/teeth, toileting, grooming, dressing, etc )  - Assess/evaluate cause of self-care deficits   - Assess range of motion  - Assess patient's mobility; develop plan if impaired  - Assess patient's need for assistive devices and provide as appropriate  - Encourage maximum independence but intervene and supervise when necessary  - Involve family in performance of ADLs  - Assess for home care needs following discharge   - Consider OT consult to assist with ADL evaluation and planning for discharge  - Provide patient education as appropriate  Outcome: Progressing  Goal: Maintain or return mobility status to optimal level  Description: INTERVENTIONS:  - Assess patient's baseline mobility status (ambulation, transfers, stairs, etc )    - Identify cognitive and physical deficits and behaviors that affect mobility  - Identify mobility aids required to assist with transfers and/or ambulation (gait belt, sit-to-stand, lift, walker, cane, etc )  - [place] fall precautions as indicated by assessment  - Record patient progress and toleration of activity level on Mobility SBAR; progress patient to next Phase/Stage  - Instruct patient to call for assistance with activity based on assessment  - Consider rehabilitation consult to assist with strengthening/weightbearing, etc   Outcome: Progressing     Problem: DISCHARGE PLANNING  Goal: Discharge to home or other facility with appropriate resources  Description: INTERVENTIONS:  - Identify barriers to discharge w/patient and caregiver  - Arrange for needed discharge resources and transportation as appropriate  - Identify discharge learning needs (meds, wound care, etc )  - Arrange for interpretive services to assist at discharge as needed  - Refer to Case Management Department for coordinating discharge planning if the patient needs post-hospital services based on physician/advanced practitioner order or complex needs related to functional status, cognitive ability, or social support system  Outcome: Progressing     Problem: Knowledge Deficit  Goal: Patient/family/caregiver demonstrates understanding of disease process, treatment plan, medications, and discharge instructions  Description: Complete learning assessment and assess knowledge base    Interventions:  - Provide teaching at level of understanding  - Provide teaching via preferred learning methods  Outcome: Progressing

## 2021-02-15 ENCOUNTER — APPOINTMENT (INPATIENT)
Dept: RADIOLOGY | Facility: HOSPITAL | Age: 81
DRG: 981 | End: 2021-02-15
Attending: RADIOLOGY
Payer: COMMERCIAL

## 2021-02-15 LAB
BLD SMEAR INTERP: NORMAL
INR PPP: 2.28 (ref 0.84–1.19)
PROTHROMBIN TIME: 25 SECONDS (ref 11.6–14.5)

## 2021-02-15 PROCEDURE — NC001 PR NO CHARGE: Performed by: RADIOLOGY

## 2021-02-15 PROCEDURE — 99153 MOD SED SAME PHYS/QHP EA: CPT

## 2021-02-15 PROCEDURE — 99232 SBSQ HOSP IP/OBS MODERATE 35: CPT | Performed by: INTERNAL MEDICINE

## 2021-02-15 PROCEDURE — 99233 SBSQ HOSP IP/OBS HIGH 50: CPT | Performed by: INTERNAL MEDICINE

## 2021-02-15 PROCEDURE — 85610 PROTHROMBIN TIME: CPT | Performed by: INTERNAL MEDICINE

## 2021-02-15 PROCEDURE — C1894 INTRO/SHEATH, NON-LASER: HCPCS

## 2021-02-15 PROCEDURE — 0JH63XZ INSERTION OF TUNNELED VASCULAR ACCESS DEVICE INTO CHEST SUBCUTANEOUS TISSUE AND FASCIA, PERCUTANEOUS APPROACH: ICD-10-PCS | Performed by: RADIOLOGY

## 2021-02-15 PROCEDURE — 99152 MOD SED SAME PHYS/QHP 5/>YRS: CPT | Performed by: RADIOLOGY

## 2021-02-15 PROCEDURE — 36558 INSERT TUNNELED CV CATH: CPT

## 2021-02-15 PROCEDURE — 36558 INSERT TUNNELED CV CATH: CPT | Performed by: RADIOLOGY

## 2021-02-15 PROCEDURE — 77001 FLUOROGUIDE FOR VEIN DEVICE: CPT

## 2021-02-15 PROCEDURE — 77001 FLUOROGUIDE FOR VEIN DEVICE: CPT | Performed by: RADIOLOGY

## 2021-02-15 PROCEDURE — 99152 MOD SED SAME PHYS/QHP 5/>YRS: CPT

## 2021-02-15 PROCEDURE — 02H633Z INSERTION OF INFUSION DEVICE INTO RIGHT ATRIUM, PERCUTANEOUS APPROACH: ICD-10-PCS | Performed by: RADIOLOGY

## 2021-02-15 PROCEDURE — C1750 CATH, HEMODIALYSIS,LONG-TERM: HCPCS

## 2021-02-15 PROCEDURE — 76937 US GUIDE VASCULAR ACCESS: CPT | Performed by: RADIOLOGY

## 2021-02-15 PROCEDURE — 76937 US GUIDE VASCULAR ACCESS: CPT

## 2021-02-15 RX ORDER — FENTANYL CITRATE 50 UG/ML
INJECTION, SOLUTION INTRAMUSCULAR; INTRAVENOUS CODE/TRAUMA/SEDATION MEDICATION
Status: COMPLETED | OUTPATIENT
Start: 2021-02-15 | End: 2021-02-15

## 2021-02-15 RX ORDER — CEFAZOLIN SODIUM 1 G/3ML
INJECTION, POWDER, FOR SOLUTION INTRAMUSCULAR; INTRAVENOUS CODE/TRAUMA/SEDATION MEDICATION
Status: COMPLETED | OUTPATIENT
Start: 2021-02-15 | End: 2021-02-15

## 2021-02-15 RX ORDER — MIDAZOLAM HYDROCHLORIDE 2 MG/2ML
INJECTION, SOLUTION INTRAMUSCULAR; INTRAVENOUS CODE/TRAUMA/SEDATION MEDICATION
Status: COMPLETED | OUTPATIENT
Start: 2021-02-15 | End: 2021-02-15

## 2021-02-15 RX ORDER — CHOLECALCIFEROL (VITAMIN D3) 10 MCG
1 TABLET ORAL
Status: DISCONTINUED | OUTPATIENT
Start: 2021-02-15 | End: 2021-02-17 | Stop reason: HOSPADM

## 2021-02-15 RX ADMIN — FENTANYL CITRATE 25 MCG: 50 INJECTION INTRAMUSCULAR; INTRAVENOUS at 15:52

## 2021-02-15 RX ADMIN — CEFAZOLIN 1000 MG: 1 INJECTION, POWDER, FOR SOLUTION INTRAMUSCULAR; INTRAVENOUS at 15:41

## 2021-02-15 RX ADMIN — GUAIFENESIN 600 MG: 600 TABLET, EXTENDED RELEASE ORAL at 21:28

## 2021-02-15 RX ADMIN — MIDAZOLAM 0.5 MG: 1 INJECTION INTRAMUSCULAR; INTRAVENOUS at 15:53

## 2021-02-15 RX ADMIN — MIDAZOLAM 0.5 MG: 1 INJECTION INTRAMUSCULAR; INTRAVENOUS at 15:42

## 2021-02-15 RX ADMIN — TRAZODONE HYDROCHLORIDE 25 MG: 50 TABLET ORAL at 21:28

## 2021-02-15 RX ADMIN — MELATONIN TAB 3 MG 6 MG: 3 TAB at 21:29

## 2021-02-15 RX ADMIN — FENTANYL CITRATE 25 MCG: 50 INJECTION INTRAMUSCULAR; INTRAVENOUS at 15:42

## 2021-02-15 RX ADMIN — BUMETANIDE 4 MG: 1 TABLET ORAL at 08:35

## 2021-02-15 NOTE — PROGRESS NOTES
Progress Note - Nephrology   [facility] Core 68 y o  male MRN: [id number]  Unit/Bed#: [facility] 806-01 Encounter: [id number]    ASSESSMENT AND PLAN:  29-year-old male with a history of diastolic CHF/hypertension/thoracic aortic dissection status post surgical repair 2019/atrial fibrillation we are seeing for ESRD:    1  ESRD:  -initially started on PD but apparently had some abdominal pain? Not good supportive home  Elected to try to switch to intermittent hemodialysis  For now other going to maintain the PD catheter as the pain has improved   -plan for permanent Juan Carlos catheter tomorrow morning and then subsequent hemodialysis tomorrow  -save nondominant left arm for permanent dialysis access  -eventual placement of [address]    2  Volume/blood pressure:  -blood pressure acceptable   -cares euvolemic    3  Electrolytes acceptable    4  MBD of ESRD:   -hyperphosphatemia:  Acceptable at 5 4  Recheck on phosphate binder renal diet    5  Anemia:  -hemoglobin 8 0 slightly lower  -multiple myeloma evaluation negative July 2020 including SPEP/UPEP/light chain ratio  This had been repeated from December 2019  -low iron studies:  Ferritin 34: Intravenous iron times 10 treatments with HD  -rule out hemolytic anemia:  LDH/haptoglobin/hemolysis smear given mild thrombocytopenia          Subjective: The patient feels well today  No chest pain or shortness of breath  No nausea vomiting or diarrhea  No pain around the PD catheter is noted  Objective:     Vitals: Blood pressure 126/67, pulse 73, temperature 98 5 °F (36 9 °C), resp  rate 16, height 5' 7" (1 702 m), weight 77 5 kg (170 lb 13 7 oz), SpO2 96 %  ,Body mass index is 26 76 kg/m²      Weight (last 2 days)     Date/Time   Weight    02/15/21 0600   77 5 (170 86)    02/14/21 0442   77 2 (170 3)    02/13/21 0600   76 7 (169 09)                Intake/Output Summary (Last 24 hours) at 2/15/2021 1010  Last data filed at 2/15/2021 0515  Gross per 24 hour   Intake 300 ml Output 2450 ml   Net -2150 ml            Physical Exam: General:  No acute distress  Skin:  No acute rash  Eyes:  No scleral icterus and noninjected  ENT:  Moist mucous membranes  Neck:  Supple, no jugular venous distention, trachea midline, overall appearance is normal  Chest:  Clear to auscultation  CVS:  Regular rate and rhythm, without a rub or gallops  Abdomen:  Normal bowel sounds, soft and nontender and mild fluid wave, PD catheter site nontender  Extremities:  No edema, and no cyanosis, no significant arthritic changes  Neuro:  No gross focality  Psych:  Alert and oriented and appropriate                Medications:    Scheduled Meds:  Current Facility-Administered Medications   Medication Dose Route Frequency Provider Last Rate    acetaminophen  650 mg Oral Q6H PRN EL Roth      atorvastatin  40 mg Oral Daily EL Bojorquez      benzonatate  100 mg Oral TID PRN Brittany Sears PA-C      bumetanide  4 mg Oral BID Desi Elaine DO      guaiFENesin  600 mg Oral Q12H [address] Panchito Santos PA-C      HYDROmorphone  0 2 mg Intravenous Q6H PRN TRINY Partida      lidocaine 1% buffered   Infiltration Code/Trauma/Sedation Med Leanne Valenzuela MD      melatonin  6 mg Oral HS EL Bojorquez      ondansetron  4 mg Intravenous Q6H PRN EL Bojorquez      oxyCODONE  10 mg Oral Q4H PRN Leanne Valenzuela MD      oxyCODONE  5 mg Oral Q4H PRN Leanne Valenzuela MD      polyethylene glycol  17 g Oral Daily Yazan Silverman DO      senna-docusate sodium  1 tablet Oral BID Vaniazacari Lux,       sevelamer  800 mg Oral TID With Meals Mikayla Torres DO      sodium chloride (PF)  3 mL Intravenous Q1H PRN EL Bojorquez      traZODone  25 mg Oral HS EL Roth         PRN Meds:   acetaminophen    benzonatate    HYDROmorphone    lidocaine 1% buffered    ondansetron    oxyCODONE    oxyCODONE    Insert peripheral IV **AND** sodium chloride (PF)    Continuous Infusions:     Lab, Imaging and other studies: I have personally reviewed pertinent labs  Laboratory Results:  Results from last 7 days   Lab Units 02/14/21  0441 02/13/21  0455 02/11/21  0514 02/09/21  1744   WBC Thousand/uL 4 68  --  8 74  --    HEMOGLOBIN g/dL 8 0*  --  8 6*  --    HEMATOCRIT % 26 0*  --  27 8*  --    PLATELETS Thousands/uL 100*  --  100*  --    POTASSIUM mmol/L  --  3 9 4 2 4 1   CHLORIDE mmol/L  --  108 103 107   CO2 mmol/L  --  31 33* 30   BUN mg/dL  --  98* 97* 91*   CREATININE mg/dL  --  3 39* 3 76* 3 54*   CALCIUM mg/dL  --  8 5 8 6 9 2     Urinalysis:   Lab Results   Component Value Date    COLORU Yellow 10/04/2020    COLORU Yellow 09/15/2016    CLARITYU Clear 10/04/2020    CLARITYU Transparent 09/15/2016    SPECGRAV 1 015 10/04/2020    SPECGRAV 1 020 09/15/2016    PHUR 5 5 10/04/2020    PHUR 6 0 09/15/2016    LEUKOCYTESUR Negative 10/04/2020    LEUKOCYTESUR negative 09/15/2016    NITRITE Negative 10/04/2020    NITRITE negative 09/15/2016    PROTEINUA 3+ 09/15/2016    GLUCOSEU Negative 10/04/2020    KETONESU Negative 10/04/2020    KETONESU negative 09/15/2016    BILIRUBINUR Negative 10/04/2020    BILIRUBINUR negative 09/15/2016    BLOODU Negative 10/04/2020    BLOODU negative 09/15/2016     ABGs:   Lab Results   Component Value Date    PH 7 296 (L) 12/15/2019     Radiology review:     Portions of the record may have been created with voice recognition software  Occasional wrong word or "sound a like" substitutions may have occurred due to the inherent limitations of voice recognition software  Read the chart carefully and recognize, using context, where substitutions have occurred

## 2021-02-15 NOTE — ASSESSMENT & PLAN NOTE
Lab Results   Component Value Date    EGFR 16 02/13/2021    EGFR 14 02/11/2021    EGFR 16 02/09/2021    CREATININE 3 39 (H) 02/13/2021    CREATININE 3 76 (H) 02/11/2021    CREATININE 3 54 (H) 02/09/2021     · Baseline creatinine 2-2 9 approximately    4 19 on presentation, now ESRD  · Holding norvasc 2/2 soft BP  · Known to Dr Charles Ace as outpatient-was on HD in past  · Peritoneal dialysis per nephrology   · Started on PD however patient started to severe bladder pain during dialysis  · Patient will be switched to hemodialysis as noted on nephrology note  · IR consulted for HD catheter placement today and removal of PD catheter tomorrow

## 2021-02-15 NOTE — ASSESSMENT & PLAN NOTE
· Rate controlled on Lopressor  · Anticoagulated on Coumadin   · Patient takes 2 mg daily at home    · Monitor INR  · Hold Coumadin  for PD catheter removal and placement of HD catheter

## 2021-02-15 NOTE — CASE MANAGEMENT
CM spoke with Otilio Jiang from [place] [facility] on [phone] to notify her that pt will be switched to HD  CM asked specific documents needs to start the switch from PD to HD  Otilio Jiang requested cath report, treatment sheets, and recent labs  A cath has not been placed at this time  CM will send notes when cath is placed and HD is started  Pt should start HD today  CM to fax to [phone]  Pt's chair time will be 6:45 am on T, TH, and Sat  CM to follow

## 2021-02-15 NOTE — PROGRESS NOTES
Progress Note - Oliver Sharpe 1940, 68 y o  male MRN: [id number]    Unit/Bed#: [facility] 806-01 Encounter: [id number]    Primary Care Provider: EL Jiménez   Date and time admitted to hospital: 1/26/2021  1:24 PM        * Acute on chronic diastolic (congestive) heart failure ([facility] [address] )  Assessment & Plan  · Presented with 15 lb weight gain in one month, GONZALES, worsening lower extremity edema  Outpatient cardiology team ordered extra dose of diuretic however no improvement  Patient was noted to have AFSHIN on outpatient labs and referred to ED  · Outpatient diuretic regimen bumex 4 mg PO BID with k 20 BID and metolazone 5 mg  2 x weekly  · Heart failure following, was on Bumex drip for several days and now transitioned to PO Bumex  · Last echo 10/2020 showed preserved EF  Mild MR  Severe TR    · I/O, daily weights, low NA diet with FR  · Patient had PD cath placed 2/3; had initial cath removed and a new placed on 2/9  · Cardiology is following,, continue with Bumex b i d   · Dialysis    ESRD (end stage renal disease) [facility])  Assessment & Plan  Lab Results   Component Value Date    EGFR 16 02/13/2021    EGFR 14 02/11/2021    EGFR 16 02/09/2021    CREATININE 3 39 (H) 02/13/2021    CREATININE 3 76 (H) 02/11/2021    CREATININE 3 54 (H) 02/09/2021     · Baseline creatinine 2-2 9 approximately  4 19 on presentation, now ESRD  · Holding norvasc 2/2 soft BP  · Known to Dr Irene Paez as outpatient-was on HD in past  · Peritoneal dialysis per nephrology   · Started on PD however patient started to severe bladder pain during dialysis  · Patient will be switched to hemodialysis as noted on nephrology note  · IR consulted for HD catheter placement today and removal of PD catheter tomorrow    Atrial fibrillation ([facility] )  Assessment & Plan  · Rate controlled on Lopressor  · Anticoagulated on Coumadin   · Patient takes 2 mg daily at home    · Monitor INR  · Hold Coumadin  for PD catheter removal and placement of HD catheter    Anemia  Assessment & Plan  · Multifactorial in setting of CKD  · IV Venofer x 10 doses per Nephrology  · Monitor    Dissection of thoracic aorta [facility])  Assessment & Plan  · 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  · 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  Risk associated with surgical options therefore recommended continued observation  Bradycardia  Assessment & Plan  Also with 2 second pause noted earlier in hospital stay  · Lopressor held  · Asymptomatic  · Appreciate cardiology recommendations, possible Zio patch as outpatient     Benign essential hypertension  Assessment & Plan  · Chronic; history of HTN  · Blood pressures have been labile  · Norvasc and Lopressor on hold  · Monitor vital signs  VTE Pharmacologic Prophylaxis:   Pharmacologic: Warfarin (Coumadin)  Mechanical VTE Prophylaxis in Place: Yes    Patient Centered Rounds: I have performed bedside rounds with nursing staff today  Discussions with Specialists or Other Care Team Provider:     Education and Discussions with Family / Patient:  Discussed with patient's daughter    Time Spent for Care: 45 minutes  More than 50% of total time spent on counseling and coordination of care as described above      Current Length of Stay: 20 day(s)    Current Patient Status: Inpatient   Certification Statement: The patient will continue to require additional inpatient hospital stay due to Above    Discharge Plan / Estimated Discharge Date: TBD    Code Status: Level 1 - Full Code      Subjective:   Patient seen and examined  Comfortable in bed  No event overnight  NPO for HD placement today    Objective:     Vitals:   Temp (24hrs), Av 5 °F (36 9 °C), Min:98 5 °F (36 9 °C), Max:98 5 °F (36 9 °C)    Temp:  [98 5 °F (36 9 °C)] 98 5 °F (36 9 °C)  HR:  [61-73] 73  Resp:  [16-19] 16  BP: (105-126)/(56-67) 126/67  SpO2:  [96 %-97 %] 96 %  Body mass index is 26 76 kg/m²  Input and Output Summary (last 24 hours): Intake/Output Summary (Last 24 hours) at 2/15/2021 1059  Last data filed at 2/15/2021 0515  Gross per 24 hour   Intake 300 ml   Output 2450 ml   Net -2150 ml       Physical Exam:     Physical Exam  Patient is awake alert oriented no acute distress  Lung clear to auscultation bilateral  Heart positive S1-S2 no murmur  Abdomen soft nontender  Lower extremities no edema    Additional Data:     Labs:    Results from last 7 days   Lab Units 02/14/21  0441   WBC Thousand/uL 4 68   HEMOGLOBIN g/dL 8 0*   HEMATOCRIT % 26 0*   PLATELETS Thousands/uL 100*     Results from last 7 days   Lab Units 02/13/21  0455   POTASSIUM mmol/L 3 9   CHLORIDE mmol/L 108   CO2 mmol/L 31   BUN mg/dL 98*   CREATININE mg/dL 3 39*   CALCIUM mg/dL 8 5     Results from last 7 days   Lab Units 02/15/21  0510   INR  2 28*       * I Have Reviewed All Lab Data Listed Above  * Additional Pertinent Lab Tests Reviewed:  [address] Admission Reviewed    Imaging:    Imaging Reports Reviewed Today Include:   Imaging Personally Reviewed by Myself Includes:     Recent Cultures (last 7 days):           Last 24 Hours Medication List:   Current Facility-Administered Medications   Medication Dose Route Frequency Provider Last Rate    acetaminophen  650 mg Oral Q6H PRN EL Roth      atorvastatin  40 mg Oral Daily EL Roth complex-vitamin C-folic acid  1 capsule Oral Daily With Consuelo Mckay MD      benzonatate  100 mg Oral TID PRN Brittany Sears PA-C      bumetanide  4 mg Oral BID Thermoerica Rizo DO      guaiFENesin  600 mg Oral Q12H [address] [facility] CHIDI Santos PA-C      HYDROmorphone  0 2 mg Intravenous Q6H PRN Gambshirley Santos PA-C      [START ON 2/16/2021] iron sucrose  100 mg Intravenous Daily Kath Garcia MD      lidocaine 1% buffered   Infiltration Code/Trauma/Sedation Med Olivia Cortez MD     [facility] melatonin  6 mg Oral HS EL Roth      ondansetron  4 mg Intravenous Q6H PRN EL Mike      oxyCODONE  10 mg Oral Q4H PRN Kvng Bose, MD      oxyCODONE  5 mg Oral Q4H PRN Kvng Drop, MD      polyethylene glycol  17 g Oral Daily Allan Rojas DO      senna-docusate sodium  1 tablet Oral BID Allan Rojas DO      sevelamer  800 mg Oral TID With Meals Niki Torres DO      sodium chloride (PF)  3 mL Intravenous Q1H PRN LE Miller      traZODone  25 mg Oral HS EL Miller          Today, Patient Was Seen By: Allan Rojas DO    ** Please Note: This note has been constructed using a voice recognition system   **

## 2021-02-15 NOTE — PROCEDURES
Central Line    Date/Time: 2/15/2021 4:39 PM  Performed by: Aramis Griffith MD  Authorized by: Aramis Griffith MD     Patient location:  IR  Consent:     Consent obtained:  Written    Consent given by:  Patient    Risks discussed:  Bleeding and infection    Alternatives discussed:  No treatment and delayed treatment  Universal protocol:     Procedure explained and questions answered to patient or proxy's satisfaction: yes      Relevant documents present and verified: yes      Test results available and properly labeled: yes      Radiology Images displayed and confirmed  If images not available, report reviewed: yes      Required blood products, implants, devices, and special equipment available: yes      Site/side marked: yes      Immediately prior to procedure, a time out was called: yes      Patient identity confirmed:  Verbally with patient and arm band  Pre-procedure details:     Hand hygiene: Hand hygiene performed prior to insertion      Sterile barrier technique: All elements of maximal sterile technique followed      Skin preparation:  2% chlorhexidine    Skin preparation agent: Skin preparation agent completely dried prior to procedure    Indications:     Central line indications: dialysis    Sedation:     Sedation type: Moderate (conscious) sedation  Anesthesia (see MAR for exact dosages):      Anesthesia method:  Local infiltration    Local anesthetic:  Lidocaine 1% WITH epi  Procedure details:     Location:  Right internal jugular    Vessel type: vein      Laterality:  Right    Approach: percutaneous technique used      Patient position:  Flat    Catheter type:  Double lumen    Catheter size:  16 Fr    Landmarks identified: yes      Ultrasound guidance: yes      Sterile ultrasound techniques: Sterile gel and sterile probe covers were used      Number of attempts:  1    Successful placement: yes    Post-procedure details:     Post-procedure:  Dressing applied and line sutured    Assessment:  Blood return through all ports and placement verified by x-ray    Post-procedure complications: none      Patient tolerance of procedure:   Tolerated well, no immediate complications

## 2021-02-15 NOTE — SEDATION DOCUMENTATION
Right chest permacath placed by Dr German Rice without complications  Pt tolerated well, pt is AAOx4, denies pain, pt is for one hour bedrest  Reports called to Shan Rushing

## 2021-02-15 NOTE — ASSESSMENT & PLAN NOTE
· Presented with 15 lb weight gain in one month, GONZALES, worsening lower extremity edema  Outpatient cardiology team ordered extra dose of diuretic however no improvement  Patient was noted to have AFSHIN on outpatient labs and referred to ED  · Outpatient diuretic regimen bumex 4 mg PO BID with k 20 BID and metolazone 5 mg  2 x weekly  · Heart failure following, was on Bumex drip for several days and now transitioned to PO Bumex  · Last echo 10/2020 showed preserved EF  Mild MR   Severe TR    · I/O, daily weights, low NA diet with FR  · Patient had PD cath placed 2/3; had initial cath removed and a new placed on 2/9  · Cardiology is following,, continue with Bumex b i d   · Dialysis

## 2021-02-15 NOTE — PROGRESS NOTES
Heart Failure/ Pulmonary Hypertension Progress Note - Oliver Sharpe 68 y o  male MRN: [id number]    [facility] Encounter: [id number]      Assessment:    Principal Problem:    Acute on chronic diastolic (congestive) heart failure (HCC)  Active Problems:    Benign essential hypertension    Bradycardia    Dissection of thoracic aorta (HCC)    Hyperlipidemia    Anemia    Atrial fibrillation (HCC)    Insomnia    ESRD (end stage renal disease) (HCC)      Subjective:   Oliver Sharpe is a 68year-old man with PMH as below who presented to [facility] 01/26/2021 after being contacted by cardiology office to review abnormal renal function  Patient had increased frequency of metolazone dosing and had minimal improvement with no reported weight loss or symptomatic improvement  Case was discussed with Dr Breanna Hood and decision was made to have patient proceed to ED for further evaluation  Patient seen and examined  Began experiencing severe suprapubic/abdominal pain with most recent PD treatments  Decision made to transition to HD  For PermCath placement today  Objective: Intake/ Output:300/2450/-2 2L  Weight:  170 lbs      Acute on chronic HFpEF - Volume management: Bumex 4mg PO BID  S/P PD catheter placement however unable to tolerate treatments  Now transitioning to HD with PermCath placement today  BP controlled at this point off antihypertensives  Continue to monitor  May need to restart Amlodipine as outpatient  Currently enrolled in Pals program                      TTE 04/24/2019: LVEF 60%  LVIDd 4 31 cm  IVSd 1 4 cm  Grade 1 DD  Normal RV size and RVSF  Mild TR  PASP 35 mmHg                TTE 10/06/2020: LVEF 60%  LVIDd 4 69 cm  IVSd 1 41 cm  Grade 2 DD  Normal RV size and RVSF  KUNAL  Mild MR  Severe TR                Paroxysmal atrial [id number] = 4 (age, CHF, HTN)  Anticoagulation on Coumadin  Persistently bradycardic this admit into the mid 30's   Trial off Metop for now and monitor  Bradycardia  With rates in the 30-40s seen on telemetry this admit  Improved  Consider further monitoring as outpatient, ie Zio patch or HM to further evaluate   BB d/c for now  Continue to monitor response as outpatient        Hypertension  Well controlled  Amlodipine 5 mg daily currently on hold for soft BP        Hyperlipidemia  FLP 1/26/21: TC 88, TD 69, HDL 42, LDL 32  Continue on atorvastatin 40 mg daily       Chronic kidney disease, stage IV Baseline creatinine was 2 0-2 3, new baseline likely 3 3-4  S/P PD cath placement on 2/3/21 however now transitioning to HD     Follows with Dr Jose Juan Welsh as outpatient  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 with hemiarch reconstruction and aortic valve resuspension with a 26 mm Dacron graft on 12/15/2019 by Dr Hu [facility]               Benign prostatic hyperplasia  History of COVID-19 infection: diagnosed in 07/2020  Review of Systems   All other systems reviewed and are negative  [employer] (day, reason): Hills catheter (day, reason):    Vitals: Blood pressure 126/67, pulse 73, temperature 98 5 °F (36 9 °C), resp  rate 16, height 5' 7" (1 702 m), weight 77 5 kg (170 lb 13 7 oz), SpO2 96 %  , Body mass index is 26 76 kg/m² , I/O last 3 completed shifts: In: 300 [P O :300]  Out: 3250 [Urine:3250]  No intake/output data recorded  Wt Readings from Last 3 Encounters:   02/15/21 77 5 kg (170 lb 13 7 oz)   01/19/21 80 3 kg (177 lb)   01/11/21 78 9 kg (174 lb)       Intake/Output Summary (Last 24 hours) at 2/15/2021 1008  Last data filed at 2/15/2021 0515  Gross per 24 hour   Intake 300 ml   Output 2450 ml   Net -2150 ml     I/O last 3 completed shifts:   In: 300 [P O :300]  Out: 3250 [Urine:3250]          Physical Exam:  Vitals:    02/14/21 1745 02/14/21 2340 02/15/21 0600 02/15/21 0708   BP: 114/59 105/64  126/67   Pulse: 70 64  73   Resp:  19  16   Temp:  98 5 °F (36 9 °C)     TempSrc:       SpO2: 96% 96%  96% Weight:   77 5 kg (170 lb 13 7 oz)    Height:           GEN: Oliver SEBASTIAN Core appears well, alert and oriented x 3, pleasant and cooperative   HEENT: pupils equal, round, and reactive to light; extraocular muscles intact  NECK: supple, no carotid bruits   HEART: regular rhythm, normal S1 and S2, no murmurs, clicks, gallops or rubs, JVP is elevated  LUNGS: diminished  to auscultation bilaterally; no  Wheezes, no rales, no rhonchi, few bibasilar crackles     ABDOMEN: normal bowel sounds, soft, no tenderness, no distention  EXTREMITIES: peripheral pulses normal; no clubbing, cyanosis, or edema  NEURO: no focal findings   SKIN: normal without suspicious lesions on exposed skin      Current Facility-Administered Medications:     acetaminophen (TYLENOL) tablet 650 mg, 650 mg, Oral, Q6H PRN, EL Roth    atorvastatin (LIPITOR) tablet 40 mg, 40 mg, Oral, Daily, EL Roth, 40 mg at 02/14/21 0841    benzonatate (TESSALON PERLES) capsule 100 mg, 100 mg, Oral, TID PRN, Brittany Sears PA-C, 100 mg at 02/10/21 2301    bumetanide (BUMEX) tablet 4 mg, 4 mg, Oral, BID, Jet Andre, DO, 4 mg at 02/15/21 0835    guaiFENesin (MUCINEX) 12 hr tablet 600 mg, 600 mg, Oral, Q12H [facility] CHIDI Santos PA-C, 600 mg at 02/14/21 2146    HYDROmorphone (DILAUDID) injection 0 2 mg, 0 2 mg, Intravenous, Q6H PRN, [facility] Centers[facility] Santos PA-C    lidocaine 1% buffered, , Infiltration, Code/Trauma/Sedation Med, Satish Bustillos MD, 20 mL at 02/09/21 1402    melatonin tablet 6 mg, 6 mg, Oral, HS, EL Roth, 6 mg at 02/14/21 2146    ondansetron (ZOFRAN) injection 4 mg, 4 mg, Intravenous, Q6H PRN, EL Roth    oxyCODONE (ROXICODONE) immediate release tablet 10 mg, 10 mg, Oral, Q4H PRN, Satish Bustillos MD, 10 mg at 02/12/21 1731    oxyCODONE (ROXICODONE) IR tablet 5 mg, 5 mg, Oral, Q4H PRN, Satish Bustillos MD    polyethylene glycol (MIRALAX) packet 17 g, 17 g, Oral, Daily, Luiza Doll DO, 17 g at 02/14/21 0841    senna-docusate sodium (SENOKOT S) 8 6-50 mg per tablet 1 tablet, 1 tablet, Oral, BID, [place], DO, 1 tablet at 02/14/21 1738    sevelamer (RENAGEL) tablet 800 mg, 800 mg, Oral, TID With Meals, Gabriela Marcie Torres, DO, 800 mg at 02/14/21 1738    Insert peripheral IV, , , Once **AND** sodium chloride (PF) 0 9 % injection 3 mL, 3 mL, Intravenous, Q1H PRN, EL Roth    traZODone (DESYREL) tablet 25 mg, 25 mg, Oral, HS, EL Roth, 25 mg at 02/14/21 2145      Labs & Results:        Results from last 7 days   Lab Units 02/14/21  0441 02/11/21  0514   WBC Thousand/uL 4 68 8 74   HEMOGLOBIN g/dL 8 0* 8 6*   HEMATOCRIT % 26 0* 27 8*   PLATELETS Thousands/uL 100* 100*         Results from last 7 days   Lab Units 02/13/21  0455 02/11/21  0514 02/09/21  1744   POTASSIUM mmol/L 3 9 4 2 4 1   CHLORIDE mmol/L 108 103 107   CO2 mmol/L 31 33* 30   BUN mg/dL 98* 97* 91*   CREATININE mg/dL 3 39* 3 76* 3 54*   CALCIUM mg/dL 8 5 8 6 9 2     Results from last 7 days   Lab Units 02/15/21  0510 02/14/21  0441 02/12/21  0450   INR  2 28* 2 26* 1 85*         Counseling / Coordination of Care  Total floor / unit time spent today 20 minutes  Greater than 50% of total time was spent with the patient and / or family counseling and / or coordination of care  A description of the counseling / coordination of care: 20  Jenelle Boland

## 2021-02-16 ENCOUNTER — APPOINTMENT (INPATIENT)
Dept: DIALYSIS | Facility: HOSPITAL | Age: 81
DRG: 981 | End: 2021-02-16
Payer: COMMERCIAL

## 2021-02-16 DIAGNOSIS — I50.20 SYSTOLIC CONGESTIVE HEART FAILURE, UNSPECIFIED HF CHRONICITY (HCC): ICD-10-CM

## 2021-02-16 LAB
ANION GAP SERPL CALCULATED.3IONS-SCNC: 6 MMOL/L (ref 4–13)
BASOPHILS # BLD AUTO: 0.01 THOUSANDS/ΜL (ref 0–0.1)
BASOPHILS NFR BLD AUTO: 0 % (ref 0–1)
BUN SERPL-MCNC: 102 MG/DL (ref 5–25)
CALCIUM SERPL-MCNC: 8.4 MG/DL (ref 8.3–10.1)
CHLORIDE SERPL-SCNC: 107 MMOL/L (ref 100–108)
CO2 SERPL-SCNC: 28 MMOL/L (ref 21–32)
CREAT SERPL-MCNC: 3.45 MG/DL (ref 0.6–1.3)
EOSINOPHIL # BLD AUTO: 0.15 THOUSAND/ΜL (ref 0–0.61)
EOSINOPHIL NFR BLD AUTO: 3 % (ref 0–6)
ERYTHROCYTE [DISTWIDTH] IN BLOOD BY AUTOMATED COUNT: 16 % (ref 11.6–15.1)
GFR SERPL CREATININE-BSD FRML MDRD: 16 ML/MIN/1.73SQ M
GLUCOSE SERPL-MCNC: 80 MG/DL (ref 65–140)
HCT VFR BLD AUTO: 26.3 % (ref 36.5–49.3)
HGB BLD-MCNC: 8.1 G/DL (ref 12–17)
IMM GRANULOCYTES # BLD AUTO: 0.02 THOUSAND/UL (ref 0–0.2)
IMM GRANULOCYTES NFR BLD AUTO: 0 % (ref 0–2)
INR PPP: 2.17 (ref 0.84–1.19)
LDH SERPL-CCNC: 265 U/L (ref 81–234)
LYMPHOCYTES # BLD AUTO: 1.01 THOUSANDS/ΜL (ref 0.6–4.47)
LYMPHOCYTES NFR BLD AUTO: 18 % (ref 14–44)
MCH RBC QN AUTO: 30.8 PG (ref 26.8–34.3)
MCHC RBC AUTO-ENTMCNC: 30.8 G/DL (ref 31.4–37.4)
MCV RBC AUTO: 100 FL (ref 82–98)
MONOCYTES # BLD AUTO: 0.95 THOUSAND/ΜL (ref 0.17–1.22)
MONOCYTES NFR BLD AUTO: 17 % (ref 4–12)
NEUTROPHILS # BLD AUTO: 3.52 THOUSANDS/ΜL (ref 1.85–7.62)
NEUTS SEG NFR BLD AUTO: 62 % (ref 43–75)
NRBC BLD AUTO-RTO: 0 /100 WBCS
PHOSPHATE SERPL-MCNC: 3.8 MG/DL (ref 2.3–4.1)
PLATELET # BLD AUTO: 105 THOUSANDS/UL (ref 149–390)
PMV BLD AUTO: 10.7 FL (ref 8.9–12.7)
POTASSIUM SERPL-SCNC: 4.3 MMOL/L (ref 3.5–5.3)
PROTHROMBIN TIME: 24 SECONDS (ref 11.6–14.5)
RBC # BLD AUTO: 2.63 MILLION/UL (ref 3.88–5.62)
SODIUM SERPL-SCNC: 141 MMOL/L (ref 136–145)
WBC # BLD AUTO: 5.66 THOUSAND/UL (ref 4.31–10.16)

## 2021-02-16 PROCEDURE — NC001 PR NO CHARGE: Performed by: PHYSICIAN ASSISTANT

## 2021-02-16 PROCEDURE — 85610 PROTHROMBIN TIME: CPT | Performed by: INTERNAL MEDICINE

## 2021-02-16 PROCEDURE — 99231 SBSQ HOSP IP/OBS SF/LOW 25: CPT | Performed by: INTERNAL MEDICINE

## 2021-02-16 PROCEDURE — 90935 HEMODIALYSIS ONE EVALUATION: CPT | Performed by: INTERNAL MEDICINE

## 2021-02-16 PROCEDURE — 80048 BASIC METABOLIC PNL TOTAL CA: CPT | Performed by: INTERNAL MEDICINE

## 2021-02-16 PROCEDURE — 83615 LACTATE (LD) (LDH) ENZYME: CPT | Performed by: INTERNAL MEDICINE

## 2021-02-16 PROCEDURE — 83010 ASSAY OF HAPTOGLOBIN QUANT: CPT | Performed by: INTERNAL MEDICINE

## 2021-02-16 PROCEDURE — 84100 ASSAY OF PHOSPHORUS: CPT | Performed by: INTERNAL MEDICINE

## 2021-02-16 PROCEDURE — 99232 SBSQ HOSP IP/OBS MODERATE 35: CPT | Performed by: INTERNAL MEDICINE

## 2021-02-16 PROCEDURE — 85025 COMPLETE CBC W/AUTO DIFF WBC: CPT | Performed by: INTERNAL MEDICINE

## 2021-02-16 RX ORDER — METOLAZONE 5 MG/1
5 TABLET ORAL AS NEEDED
Qty: 30 TABLET | Refills: 0 | OUTPATIENT
Start: 2021-02-16

## 2021-02-16 RX ORDER — WARFARIN SODIUM 2 MG/1
2 TABLET ORAL
Status: DISCONTINUED | OUTPATIENT
Start: 2021-02-16 | End: 2021-02-16

## 2021-02-16 RX ADMIN — MELATONIN TAB 3 MG 6 MG: 3 TAB at 22:03

## 2021-02-16 RX ADMIN — Medication 1 CAPSULE: at 17:16

## 2021-02-16 RX ADMIN — SEVELAMER HYDROCHLORIDE 800 MG: 800 TABLET, FILM COATED PARENTERAL at 11:50

## 2021-02-16 RX ADMIN — SENNOSIDES AND DOCUSATE SODIUM 1 TABLET: 8.6; 5 TABLET ORAL at 17:16

## 2021-02-16 RX ADMIN — GUAIFENESIN 600 MG: 600 TABLET, EXTENDED RELEASE ORAL at 08:43

## 2021-02-16 RX ADMIN — BUMETANIDE 4 MG: 1 TABLET ORAL at 08:43

## 2021-02-16 RX ADMIN — SENNOSIDES AND DOCUSATE SODIUM 1 TABLET: 8.6; 5 TABLET ORAL at 08:43

## 2021-02-16 RX ADMIN — IRON SUCROSE 100 MG: 20 INJECTION, SOLUTION INTRAVENOUS at 08:47

## 2021-02-16 RX ADMIN — POLYETHYLENE GLYCOL 3350 17 G: 17 POWDER, FOR SOLUTION ORAL at 08:43

## 2021-02-16 RX ADMIN — TRAZODONE HYDROCHLORIDE 25 MG: 50 TABLET ORAL at 22:04

## 2021-02-16 RX ADMIN — ATORVASTATIN CALCIUM 40 MG: 40 TABLET, FILM COATED ORAL at 08:43

## 2021-02-16 RX ADMIN — SEVELAMER HYDROCHLORIDE 800 MG: 800 TABLET, FILM COATED PARENTERAL at 08:43

## 2021-02-16 RX ADMIN — GUAIFENESIN 600 MG: 600 TABLET, EXTENDED RELEASE ORAL at 22:03

## 2021-02-16 NOTE — ASSESSMENT & PLAN NOTE
· Presented with 15 lb weight gain in one month, GONZALES, worsening lower extremity edema  Outpatient cardiology team ordered extra dose of diuretic however no improvement  Patient was noted to have AFSHIN on outpatient labs and referred to ED  · Outpatient diuretic regimen bumex 4 mg PO BID with k 20 BID and metolazone 5 mg  2 x weekly  · Heart failure following, was on Bumex drip for several days and now transitioned to PO Bumex  · Last echo 10/2020 showed preserved EF  Mild MR   Severe TR    · I/O, daily weights, low NA diet with FR  · Patient had PD cath placed 2/3; had initial cath removed and a new placed on 2/9  · Cardiology is following,, continue with Bumex b i d   · Dialysis per nephrology

## 2021-02-16 NOTE — PROGRESS NOTES
Progress Note - Nephrology   Mckayla SEBASTIAN Core 68 y o  male MRN: [id number]  [facility] En[facility]: [id number]    ASSESSMENT AND PLAN:  62-year-old male with a history of diastolic CHF/hypertension/thoracic aortic dissection status post surgical repair 2019/atrial fibrillation we are seeing for ESRD:     1  ESRD:  -initially started on PD but apparently had some abdominal pain? Not good supportive home  Patient switching to HD today  Dr María Fernandes spoke with the patient's daughter who absolutely and adamantly once the PD catheter removed along with the patient as they do not want to ever return to peritoneal dialysis  -save nondominant left arm for permanent dialysis access  -eventual placement of eat [facility] [address]  · Status post right [facility] today  · Plan for PD catheter removal by Interventional Radiology  · HD today:    HEMODIALYSIS PROCEDURE NOTE  The patient was seen and examined on hemodialysis  The patient is tolerating the procedure well  Time: 2 5 hours  Sodium:  130 Blood flow: 250   Dialyzer: F160 Potassium:3 Dialysate flow:  1 5 times BFR   Access:  Right TDC Bicarbonate:  35 Ultrafiltration goal:  Even   Medications on HD:  None        2  Volume/blood pressure:  -blood pressure acceptable   - euvolemic     3  Electrolytes acceptable     4  MBD of ESRD:   -hyperphosphatemia:  Acceptable at 3 8:  Adjust phosphate binders  and renal diet     5  Anemia:  -hemoglobin 8 1 which is stable  -multiple myeloma evaluation negative July 2020 including SPEP/UPEP/light chain ratio  This had been repeated from December 2019  -low iron studies:  Ferritin 34: Intravenous iron times 10 treatments with HD  -rule out hemolytic anemia:   · LDH mildly elevated at 265  · Hemolysis smear negative  · Haptoglobin pending           Subjective: The patient is undergoing hemodialysis  Tolerating well so far  No chest pain or shortness of breath  No nausea vomiting or diarrhea      Objective:     Vitals: Blood pressure 117/62, pulse 70, temperature 98 3 °F (36 8 °C), resp  rate 16, height 5' 7" (1 702 m), weight 77 2 kg (170 lb 3 oz), SpO2 98 %  ,Body mass index is 26 66 kg/m²  Weight (last 2 days)     Date/Time   Weight    02/16/21 0600   77 2 (170 19)    02/15/21 1719   77 5 (170 86)    02/15/21 0600   77 5 (170 86)    02/14/21 0442   77 2 (170 3)                Intake/Output Summary (Last 24 hours) at 2/16/2021 1344  Last data filed at 2/16/2021 1335  Gross per 24 hour   Intake 380 ml   Output 1400 ml   Net -1020 ml       HD Permanent Double Catheter (Active)       Physical Exam: General:  No acute distress  Skin:  No acute rash  Eyes:  No scleral icterus and noninjected  ENT:  Moist mucous membranes  Neck:  Supple, no jugular venous distention, trachea midline, overall appearance is normal  Chest:  Clear to auscultation; right TDC clean and dry  CVS:  Regular rate and rhythm, without a rub or gallops  Abdomen:  Normal bowel sounds, soft and nontender and nondistended; PD catheter in place and bandaged    Extremities:  No edema, and no cyanosis, no significant arthritic changes  Neuro:  No gross focality  Psych:  Alert and oriented and appropriate                Medications:    Scheduled Meds:  Current Facility-Administered Medications   Medication Dose Route Frequency Provider Last Rate    acetaminophen  650 mg Oral Q6H PRN EL Roth      atorvastatin  40 mg Oral Daily EL Roth      b complex-vitamin C-folic acid  1 capsule Oral Daily With Grace Horn MD      benzonatate  100 mg Oral TID PRN Brittany Sears PA-C      bumetanide  4 mg Oral BID Michelle Hood DO      guaiFENesin  600 mg Oral Q12H [address] Gambshirley Santos PA-C      HYDROmorphone  0 2 mg Intravenous Q6H PRN Panchito Santos PA-C      iron sucrose  100 mg Intravenous Daily Isa Yang MD      melatonin  6 mg Oral HS EL Roth      ondansetron  4 mg Intravenous Q6H PRN EL Smith      oxyCODONE 10 mg Oral Q4H PRN Anya Lee MD      oxyCODONE  5 mg Oral Q4H PRN Anya Lee MD      polyethylene glycol  17 g Oral Daily Willy Freire DO      senna-docusate sodium  1 tablet Oral BID Willy Freire DO      sevelamer  800 mg Oral TID With Meals Ira Torres DO      sodium chloride (PF)  3 mL Intravenous Q1H PRN EL Roth      traZODone  25 mg Oral HS EL Roth         PRN Meds:   acetaminophen    benzonatate    HYDROmorphone    ondansetron    oxyCODONE    oxyCODONE    Insert peripheral IV **AND** sodium chloride (PF)    Continuous Infusions:     Lab, Imaging and other studies: I have personally reviewed pertinent labs    Laboratory Results:  Results from last 7 days   Lab Units 02/16/21  0458 02/14/21  0441 02/13/21  0455 02/11/21  0514 02/09/21  1744   WBC Thousand/uL 5 66 4 68  --  8 74  --    HEMOGLOBIN g/dL 8 1* 8 0*  --  8 6*  --    HEMATOCRIT % 26 3* 26 0*  --  27 8*  --    PLATELETS Thousands/uL 105* 100*  --  100*  --    POTASSIUM mmol/L 4 3  --  3 9 4 2 4 1   CHLORIDE mmol/L 107  --  108 103 107   CO2 mmol/L 28  --  31 33* 30   BUN mg/dL 102*  --  98* 97* 91*   CREATININE mg/dL 3 45*  --  3 39* 3 76* 3 54*   CALCIUM mg/dL 8 4  --  8 5 8 6 9 2   PHOSPHORUS mg/dL 3 8  --   --   --   --      Urinalysis:   Lab Results   Component Value Date    COLORU Yellow 10/04/2020    COLORU Yellow 09/15/2016    CLARITYU Clear 10/04/2020    CLARITYU Transparent 09/15/2016    SPECGRAV 1 015 10/04/2020    SPECGRAV 1 020 09/15/2016    PHUR 5 5 10/04/2020    PHUR 6 0 09/15/2016    LEUKOCYTESUR Negative 10/04/2020    LEUKOCYTESUR negative 09/15/2016    NITRITE Negative 10/04/2020    NITRITE negative 09/15/2016    PROTEINUA 3+ 09/15/2016    GLUCOSEU Negative 10/04/2020    KETONESU Negative 10/04/2020    KETONESU negative 09/15/2016    BILIRUBINUR Negative 10/04/2020    BILIRUBINUR negative 09/15/2016    BLOODU Negative 10/04/2020    BLOODU negative 09/15/2016     ABGs: Lab Results   Component Value Date    PH 7 296 (L) 12/15/2019     Radiology review:     Portions of the record may have been created with voice recognition software  Occasional wrong word or "sound a like" substitutions may have occurred due to the inherent limitations of voice recognition software  Read the chart carefully and recognize, using context, where substitutions have occurred

## 2021-02-16 NOTE — TELEMEDICINE
INTERPROFESSIONAL (PHONE) [id number] [facility] Annemarie 68 y o  male MRN: [id number]  Unit/Bed#: [facility] 806-01 Encounter: [id number]    IR has been consulted to evaluate the patient, determine the appropriate procedure, and whether or not a procedure can and should be performed regarding the care of Oliver Sharpe  We were consulted by internal medicine concerning removal of peritoneal dialysis catheter, and to possibly perform a PD cath removal if medically appropriate for the patient  IP Consult to IR  Consult performed by: Howard Blackman PA-C  Consult ordered by: Claudetta Daunt, MD        02/16/21      Assessment/Recommendation:     68year old male with pmh of ESRD, peritoneal dialysis catheter placed 2/3/21 with replacement 2/9/21    - patient has poor social support  Patient will be moving with daughter to [place] and will not be able to have peritoneal dialysis  - will plan for removal tomorrow with anesthesia per IR schedule allowing  - please keep npo after midnight  - INR 2 17 today  Discussed with Dr Truman Phelps  Will need INR below 2, preferably 1 5      Total time spent in review of data, discussion with requesting provider and rendering advice was 25 minutes        Patient or appropriate family member was verbally informed by internal medicine of this consultative service on their behalf to provide more timely access to specialty care in lieu of an in person consultation  Verbal consent was obtained  Thank you for allowing Interventional Radiology to participate in the care of [address]  Please don't hesitate to call or TigerText us with any questions       Howard Blackman PA-C

## 2021-02-16 NOTE — PROGRESS NOTES
Progress Note - Oliver SEBASTIAN Core 1940, 68 y o  male MRN: [id number]    Unit/Bed#: [facility] 806-01 Encounter: [id number]    Primary Care Provider: EL Cohen   Date and time admitted to hospital: 1/26/2021  1:24 PM        * Acute on chronic diastolic (congestive) heart failure ([address] )  Assessment & Plan  · Presented with 15 lb weight gain in one month, GONZALES, worsening lower extremity edema  Outpatient cardiology team ordered extra dose of diuretic however no improvement  Patient was noted to have AFSHIN on outpatient labs and referred to ED  · Outpatient diuretic regimen bumex 4 mg PO BID with k 20 BID and metolazone 5 mg  2 x weekly  · Heart failure following, was on Bumex drip for several days and now transitioned to PO Bumex  · Last echo 10/2020 showed preserved EF  Mild MR  Severe TR    · I/O, daily weights, low NA diet with FR  · Patient had PD cath placed 2/3; had initial cath removed and a new placed on 2/9  · Cardiology is following,, continue with Bumex b i d   · Dialysis per nephrology    ESRD (end stage renal disease) [facility])  Assessment & Plan  Lab Results   Component Value Date    EGFR 16 02/16/2021    EGFR 16 02/13/2021    EGFR 14 02/11/2021    CREATININE 3 45 (H) 02/16/2021    CREATININE 3 39 (H) 02/13/2021    CREATININE 3 76 (H) 02/11/2021     · Baseline creatinine 2-2 9 approximately  4 19 on presentation, now ESRD  · Holding norvasc 2/2 soft BP  · Known to Dr Vaughn Bender as outpatient-was on HD in past  · Peritoneal dialysis per nephrology   · Started on PD however patient started to severe bladder pain during dialysis  · Patient will be switched to hemodialysis   · HD catheter in place  · Dialysis per Nephrology    Atrial fibrillation [facility])  Assessment & Plan  · Rate controlled on Lopressor    · Anticoagulated on Coumadin   · Coumadin on hold - awaiting removal of peritoneal dialysis catheter  · Monitor INR    Anemia  Assessment & Plan  · Multifactorial in setting of CKD  · IV Venofer x 10 doses per Nephrology  · Monitor    Hyperlipidemia  Assessment & Plan  · Continue statin    Dissection of thoracic aorta [facility])  Assessment & Plan  · 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  · 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  Risk associated with surgical options therefore recommended continued observation  Bradycardia  Assessment & Plan  Also with 2 second pause noted earlier in hospital stay  · Lopressor held  · Asymptomatic  · Appreciate cardiology recommendations, possible Zio patch as outpatient     Benign essential hypertension  Assessment & Plan  · Chronic; history of HTN  · Blood pressures have been labile  · Norvasc and Lopressor on hold  · Monitor blood pressures  · Avoid hypotension      VTE Pharmacologic Prophylaxis:   Pharmacologic: Warfarin (Coumadin)  Mechanical VTE Prophylaxis in Place: Yes    Patient Centered Rounds: I have performed bedside rounds with nursing staff today  Discussions with Specialists or Other Care Team Provider:  Nephrology, IR    Education and Discussions with Family / Patient:  Patient, called and discussed with carmencita Agarwal Memos update provided -- Any Solorioos reports she wants the peritoneal dialysis catheter to be removed - discussed with Nephrology will request IR for PD catheter removal    Time Spent for Care: 30 minutes  More than 50% of total time spent on counseling and coordination of care as described above      Current Length of Stay: 21 day(s)    Current Patient Status: Inpatient   Certification Statement: The patient will continue to require additional inpatient hospital stay due to As outlined    Discharge Plan:  Awaiting outpatient dialysis schedule case management following    Code Status: Level 1 - Full Code      Subjective:     Comfortably in bed  Reports feeling okay    Discussed with Nephrology and carmencita Agarwal Memos - Sender requesting removal of peritoneal dialysis catheter, she plans to take her father Mary Pereira to [place] in a couple of months, she reports she will make arrangements for dialysis at [place] on transfer there    She again requests removal off peritoneal dialysis catheter  Discussed with IR  Discussed with Nephrology    Objective:     Vitals:   Temp (24hrs), Av 3 °F (36 8 °C), Min:98 3 °F (36 8 °C), Max:98 3 °F (36 8 °C)    Temp:  [98 3 °F (36 8 °C)] 98 3 °F (36 8 °C)  HR:  [] 70  Resp:  [15-16] 16  BP: ()/(56-77) 117/62  SpO2:  [96 %-100 %] 98 %  Body mass index is 26 66 kg/m²  Input and Output Summary (last 24 hours): Intake/Output Summary (Last 24 hours) at 2021 1240  Last data filed at 2021 0830  Gross per 24 hour   Intake 180 ml   Output 1400 ml   Net -1220 ml       Physical Exam:     Physical Exam    Comfortably lying in bed  Neck supple  Lungs diminished breath sounds bilaterally no additional sounds  Heart sounds S1-S2 noted  Abdomen soft nontender  Awake obeys simple commands  Pulses noted  No rash        Additional Data:     Labs:    Results from last 7 days   Lab Units 21  0458   WBC Thousand/uL 5 66   HEMOGLOBIN g/dL 8 1*   HEMATOCRIT % 26 3*   PLATELETS Thousands/uL 105*   NEUTROS PCT % 62   LYMPHS PCT % 18   MONOS PCT % 17*   EOS PCT % 3     Results from last 7 days   Lab Units 21  0458   SODIUM mmol/L 141   POTASSIUM mmol/L 4 3   CHLORIDE mmol/L 107   CO2 mmol/L 28   BUN mg/dL 102*   CREATININE mg/dL 3 45*   ANION GAP mmol/L 6   CALCIUM mg/dL 8 4   GLUCOSE RANDOM mg/dL 80     Results from last 7 days   Lab Units 21  0458   INR  2 17*         * I Have Reviewed All Lab Data Listed Above  * Additional Pertinent Lab Tests Reviewed:  All Labs Within Last 24 Hours Reviewed    Imaging:    Imaging Reports Reviewed Today Include:   Imaging Personally Reviewed by Myself Includes:     Recent Cultures (last 7 days):           Last 24 Hours Medication List:   Current Facility-Administered Medications Medication Dose Route Frequency Provider Last Rate    acetaminophen  650 mg Oral Q6H PRN EL Roth      atorvastatin  40 mg Oral Daily EL Roth      b complex-vitamin C-folic acid  1 capsule Oral Daily With Juan José Mcfarlane MD      benzonatate  100 mg Oral TID PRN Brittany Sears PA-C      bumetanide  4 mg Oral BID Leila Kanaris, DO      guaiFENesin  600 mg Oral Q12H [address] D TRINY Santos      HYDROmorphone  0 2 mg Intravenous Q6H PRN [place] D TRINY Santos      iron sucrose  100 mg Intravenous Daily Carmen Pitts MD      melatonin  6 mg Oral HS EL Roth      ondansetron  4 mg Intravenous Q6H PRN EL Vásquez      oxyCODONE  10 mg Oral Q4H PRN Royce Saleem MD      oxyCODONE  5 mg Oral Q4H PRN Royce Saleem MD      polyethylene glycol  17 g Oral Daily Elissa Brown DO      senna-docusate sodium  1 tablet Oral BID Elissa Brown DO      sevelamer  800 mg Oral TID With Meals Leah Torres DO      sodium chloride (PF)  3 mL Intravenous Q1H PRN EL Vásquez      traZODone  25 mg Oral HS EL Vásquez          Today, Patient Was Seen By: Christophe Ho MD    ** Please Note: Dictation voice to text software may have been used in the creation of this document   **

## 2021-02-16 NOTE — ASSESSMENT & PLAN NOTE
· Chronic; history of HTN    · Blood pressures have been labile  · Norvasc and Lopressor on hold  · Monitor blood pressures  · Avoid hypotension

## 2021-02-16 NOTE — ASSESSMENT & PLAN NOTE
Lab Results   Component Value Date    EGFR 16 02/16/2021    EGFR 16 02/13/2021    EGFR 14 02/11/2021    CREATININE 3 45 (H) 02/16/2021    CREATININE 3 39 (H) 02/13/2021    CREATININE 3 76 (H) 02/11/2021     · Baseline creatinine 2-2 9 approximately    4 19 on presentation, now ESRD  · Holding norvasc 2/2 soft BP  · Known to Dr Tamar Mccall as outpatient-was on HD in past  · Peritoneal dialysis per nephrology   · Started on PD however patient started to severe bladder pain during dialysis  · Patient will be switched to hemodialysis   · HD catheter in place  · Dialysis per Nephrology

## 2021-02-16 NOTE — PLAN OF CARE
Problem: Potential for Falls  Goal: Patient will remain free of falls  Description: INTERVENTIONS:  - Assess patient frequently for physical needs  -  Identify cognitive and physical deficits and behaviors that affect risk of falls    -  [place] fall precautions as indicated by assessment   - Educate patient/family on patient safety including physical limitations  - Instruct patient to call for assistance with activity based on assessment  - Modify environment to reduce risk of injury  - Consider OT/PT consult to assist with strengthening/mobility  Outcome: Progressing     Problem: CARDIOVASCULAR - ADULT  Goal: Maintains optimal cardiac output and hemodynamic stability  Description: INTERVENTIONS:  - Monitor I/O, vital signs and rhythm  - Monitor for S/S and trends of decreased cardiac output  - Administer and titrate ordered vasoactive medications to optimize hemodynamic stability  - Assess quality of pulses, skin color and temperature  - Assess for signs of decreased coronary artery perfusion  - Instruct patient to report change in severity of symptoms  Outcome: Progressing     Problem: METABOLIC, FLUID AND ELECTROLYTES - ADULT  Goal: Electrolytes maintained within normal limits  Description: INTERVENTIONS:  - Monitor labs and assess patient for signs and symptoms of electrolyte imbalances  - Administer electrolyte replacement as ordered  - Monitor response to electrolyte replacements, including repeat lab results as appropriate  - Instruct patient on fluid and nutrition as appropriate  Outcome: Progressing  Goal: Fluid balance maintained  Description: INTERVENTIONS:  - Monitor labs   - Monitor I/O and WT  - Instruct patient on fluid and nutrition as appropriate  - Assess for signs & symptoms of volume excess or deficit  Outcome: Progressing     Problem: MUSCULOSKELETAL - ADULT  Goal: Maintain or return mobility to safest level of function  Description: INTERVENTIONS:  - Assess patient's ability to carry out ADLs; assess patient's baseline for ADL function and identify physical deficits which impact ability to perform ADLs (bathing, care of mouth/teeth, toileting, grooming, dressing, etc )  - Assess/evaluate cause of self-care deficits   - Assess range of motion  - Assess patient's mobility  - Assess patient's need for assistive devices and provide as appropriate  - Encourage maximum independence but intervene and supervise when necessary  - Involve family in performance of ADLs  - Assess for home care needs following discharge   - Consider OT consult to assist with ADL evaluation and planning for discharge  - Provide patient education as appropriate  Outcome: Progressing     Problem: Nutrition/Hydration-ADULT  Goal: Nutrient/Hydration intake appropriate for improving, restoring or maintaining nutritional needs  Description: Monitor and assess patient's nutrition/hydration status for malnutrition  Collaborate with interdisciplinary team and initiate plan and interventions as ordered  Monitor patient's weight and dietary intake as ordered or per policy  Utilize nutrition screening tool and intervene as necessary  Determine patient's food preferences and provide high-protein, high-caloric foods as appropriate       INTERVENTIONS:  - Monitor oral intake, urinary output, labs, and treatment plans  - Assess nutrition and hydration status and recommend course of action  - Evaluate amount of meals eaten  - Assist patient with eating if necessary   - Allow adequate time for meals  - Recommend/ encourage appropriate diets, oral nutritional supplements, and vitamin/mineral supplements  - Order, calculate, and assess calorie counts as needed  - Recommend, monitor, and adjust tube feedings and TPN/PPN based on assessed needs  - Assess need for intravenous fluids  - Provide specific nutrition/hydration education as appropriate  - Include patient/family/caregiver in decisions related to nutrition  Outcome: Progressing     Problem: PAIN - ADULT  Goal: Verbalizes/displays adequate comfort level or baseline comfort level  Description: Interventions:  - Encourage patient to monitor pain and request assistance  - Assess pain using appropriate pain scale  - Administer analgesics based on type and severity of pain and evaluate response  - Implement non-pharmacological measures as appropriate and evaluate response  - Consider cultural and social influences on pain and pain management  - Notify physician/advanced practitioner if interventions unsuccessful or patient reports new pain  Outcome: Progressing     Problem: INFECTION - ADULT  Goal: Absence or prevention of progression during hospitalization  Description: INTERVENTIONS:  - Assess and monitor for signs and symptoms of infection  - Monitor lab/diagnostic results  - Monitor all insertion sites, i e  indwelling lines, tubes, and drains  - Monitor endotracheal if appropriate and nasal secretions for changes in amount and color  - [place] appropriate cooling/warming therapies per order  - Administer medications as ordered  - Instruct and encourage patient and family to use good hand hygiene technique  - Identify and instruct in appropriate isolation precautions for identified infection/condition  Outcome: Progressing     Problem: SAFETY ADULT  Goal: Patient will remain free of falls  Description: INTERVENTIONS:  - Assess patient frequently for physical needs  -  Identify cognitive and physical deficits and behaviors that affect risk of falls    -  [place] fall precautions as indicated by assessment   - Educate patient/family on patient safety including physical limitations  - Instruct patient to call for assistance with activity based on assessment  - Modify environment to reduce risk of injury  - Consider OT/PT consult to assist with strengthening/mobility  Outcome: Progressing  Goal: Maintain or return to baseline ADL function  Description: INTERVENTIONS:  -  Assess patient's ability to carry out ADLs; assess patient's baseline for ADL function and identify physical deficits which impact ability to perform ADLs (bathing, care of mouth/teeth, toileting, grooming, dressing, etc )  - Assess/evaluate cause of self-care deficits   - Assess range of motion  - Assess patient's mobility; develop plan if impaired  - Assess patient's need for assistive devices and provide as appropriate  - Encourage maximum independence but intervene and supervise when necessary  - Involve family in performance of ADLs  - Assess for home care needs following discharge   - Consider OT consult to assist with ADL evaluation and planning for discharge  - Provide patient education as appropriate  Outcome: Progressing  Goal: Maintain or return mobility status to optimal level  Description: INTERVENTIONS:  - Assess patient's baseline mobility status (ambulation, transfers, stairs, etc )    - Identify cognitive and physical deficits and behaviors that affect mobility  - Identify mobility aids required to assist with transfers and/or ambulation (gait belt, sit-to-stand, lift, walker, cane, etc )  - [place] fall precautions as indicated by assessment  - Record patient progress and toleration of activity level on Mobility SBAR; progress patient to next Phase/Stage  - Instruct patient to call for assistance with activity based on assessment  - Consider rehabilitation consult to assist with strengthening/weightbearing, etc   Outcome: Progressing     Problem: DISCHARGE PLANNING  Goal: Discharge to home or other facility with appropriate resources  Description: INTERVENTIONS:  - Identify barriers to discharge w/patient and caregiver  - Arrange for needed discharge resources and transportation as appropriate  - Identify discharge learning needs (meds, wound care, etc )  - Arrange for interpretive services to assist at discharge as needed  - Refer to Case Management Department for coordinating discharge planning if the patient needs post-hospital services based on physician/advanced practitioner order or complex needs related to functional status, cognitive ability, or social support system  Outcome: Progressing     Problem: Knowledge Deficit  Goal: Patient/family/caregiver demonstrates understanding of disease process, treatment plan, medications, and discharge instructions  Description: Complete learning assessment and assess knowledge base    Interventions:  - Provide teaching at level of understanding  - Provide teaching via preferred learning methods  Outcome: Progressing

## 2021-02-16 NOTE — CASE MANAGEMENT
CM fax updated clinicals to Tarsha Omer at [facility]  [phone]  CM spoke with Ji May (pt's daughter) and notified her of pt's chair time  CM provided [facility]  CM advised pt to arrive half hour early for first appointment  Niece to provide transportation at [place]  CM to follow

## 2021-02-16 NOTE — PROGRESS NOTES
Heart Failure/ Pulmonary Hypertension Progress Note - Oliver Sharpe 68 y o  male MRN: [id number]    [facility] Encounter: [id number]      Assessment:    Principal Problem:    Acute on chronic diastolic (congestive) heart failure (HCC)  Active Problems:    Benign essential hypertension    Bradycardia    Dissection of thoracic aorta (HCC)    Hyperlipidemia    Anemia    Atrial fibrillation (HCC)    Insomnia    ESRD (end stage renal disease) (HCC)      Subjective:   Oliver Sharpe is a 68year-old man with PMH as below who presented to [facility] 01/26/2021 after being contacted by cardiology office to review abnormal renal function  Patient had increased frequency of metolazone dosing and had minimal improvement with no reported weight loss or symptomatic improvement  Case was discussed with Dr Lesvia Tarango and decision was made to have patient proceed to ED for further evaluation  Patient seen and examined  Permacath placed  For HD today  Objective: Intake/ Output:not recorded  Weight:  Bed weights  Acute on chronic HFpEF - Volume management: Bumex 4mg PO BID  S/P PD catheter placement however unable to tolerate treatments  Now transitioning to HD via Permacath  BP controlled at this point off antihypertensives  Continue to monitor  May need to restart Amlodipine as outpatient  Currently enrolled in [facility] program                      TTE 04/24/2019: LVEF 60%  LVIDd 4 31 cm  IVSd 1 4 cm  Grade 1 DD  Normal RV size and RVSF  Mild TR  PASP 35 mmHg                TTE 10/06/2020: LVEF 60%  LVIDd 4 69 cm  IVSd 1 41 cm  Grade 2 DD  Normal RV size and RVSF  KUNAL  Mild MR  Severe TR                Paroxysmal atrial [id number] = 4 (age, CHF, HTN)  Anticoagulation on Coumadin  Persistently bradycardic this admit into the mid 30's  Trial off Metop for now and monitor  Bradycardia  With rates in the 30-40s seen on telemetry this admit  Improved   Consider further monitoring as outpatient, ie Zio patch or HM to further evaluate   BB d/c for now  Continue to monitor response as outpatient        Hypertension  Well controlled  Amlodipine 5 mg daily currently on hold for soft BP        Hyperlipidemia  FLP 1/26/21: TC 88, TD 69, HDL 42, LDL 32  Continue on atorvastatin 40 mg daily       Chronic kidney disease, stage IV Baseline creatinine was 2 0-2 3, new baseline likely 3 3-5  S/P PD cath placement on 2/3/21 however now transitioning to HD  Right sided permacath placed on 2/15/21   Plan for HD treatment today   Follows with Dr Levon Sage as outpatient  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 with hemiarch reconstruction and aortic valve resuspension with a 26 mm Dacron graft on 12/15/2019 by Dr Caroline Carr               Benign prostatic hyperplasia  History of COVID-19 infection: diagnosed in 07/2020  Review of Systems   All other systems reviewed and are negative  [employer] (day, reason): [place] catheter (day, reason):    Vitals: Blood pressure 117/62, pulse 70, temperature 98 3 °F (36 8 °C), resp  rate 16, height 5' 7" (1 702 m), weight 77 2 kg (170 lb 3 oz), SpO2 98 %  , Body mass index is 26 66 kg/m² , I/O last 3 completed shifts: In: 0   Out: 2800 [Urine:2800]  I/O this shift:  In: 180 [P O :180]  Out: 600 [Urine:600]  Wt Readings from Last 3 Encounters:   02/16/21 77 2 kg (170 lb 3 oz)   01/19/21 80 3 kg (177 lb)   01/11/21 78 9 kg (174 lb)       Intake/Output Summary (Last 24 hours) at 2/16/2021 1029  Last data filed at 2/16/2021 0830  Gross per 24 hour   Intake 180 ml   Output 2200 ml   Net -2020 ml     I/O last 3 completed shifts:   In: 0   Out: 2800 [Urine:2800]          Physical Exam:  Vitals:    02/15/21 1751 02/15/21 2230 02/16/21 0600 02/16/21 0811   BP: 95/57 122/63  117/62   Pulse: 69 66  70   Resp:  16     Temp:  98 3 °F (36 8 °C)     TempSrc:       SpO2: 98% 100%  98%   Weight:   77 2 kg (170 lb 3 oz)    Height:           GEN: Oliver SEBASTIAN Core appears well, alert and oriented x 3, pleasant and cooperative   HEENT: pupils equal, round, and reactive to light; extraocular muscles intact  NECK: supple, no carotid bruits   HEART: regular rhythm, normal S1 and S2, no murmurs, clicks, gallops or rubs, JVP is elevated  LUNGS: diminished  to auscultation bilaterally; no  Wheezes, no rales, no rhonchi, few bibasilar crackles     ABDOMEN: normal bowel sounds, soft, no tenderness, no distention  EXTREMITIES: peripheral pulses normal; no clubbing, cyanosis, or edema  NEURO: no focal findings   SKIN: normal without suspicious lesions on exposed skin      Current Facility-Administered Medications:     acetaminophen (TYLENOL) tablet 650 mg, 650 mg, Oral, Q6H PRN, EL Roth    atorvastatin (LIPITOR) tablet 40 mg, 40 mg, Oral, Daily, EL Roth, 40 mg at 02/16/21 0843    b complex-vitamin C-folic acid (NEPHROCAPS) capsule 1 capsule, 1 capsule, Oral, Daily With Dinner, Asher Burks MD    benzonatate (TESSALON PERLES) capsule 100 mg, 100 mg, Oral, TID PRN, Brittany Sears PA-C, 100 mg at 02/10/21 2301    bumetanide (BUMEX) tablet 4 mg, 4 mg, Oral, BID, Jet Andre, DO, 4 mg at 02/16/21 0843    guaiFENesin (MUCINEX) 12 hr tablet 600 mg, 600 mg, Oral, Q12H [facility], [facility] CHIDI Santos PA-C, 600 mg at 02/16/21 0843    HYDROmorphone (DILAUDID) injection 0 2 mg, 0 2 mg, Intravenous, Q6H PRN, [facility] CHIDI Santos PA-C    iron sucrose (VENOFER) 100 mg in sodium chloride 0 9 % 100 mL IVPB, 100 mg, Intravenous, Daily, Asher Burks MD, 100 mg at 02/16/21 0847    melatonin tablet 6 mg, 6 mg, Oral, HS, EL Roth, 6 mg at 02/15/21 2129    ondansetron (ZOFRAN) injection 4 mg, 4 mg, Intravenous, Q6H PRN, EL Roth    oxyCODONE (ROXICODONE) immediate release tablet 10 mg, 10 mg, Oral, Q4H PRN, Satish Bustillos MD, 10 mg at 02/12/21 1731    oxyCODONE (ROXICODONE) IR tablet 5 mg, 5 mg, Oral, Q4H PRN, MD Vanessa Ennis polyethylene glycol (MIRALAX) packet 17 g, 17 g, Oral, Daily, Liana William, DO, 17 g at 02/16/21 0843    senna-docusate sodium (SENOKOT S) 8 6-50 mg per tablet 1 tablet, 1 tablet, Oral, BID, Liana Novel, DO, 1 tablet at 02/16/21 0843    sevelamer (RENAGEL) tablet 800 mg, 800 mg, Oral, TID With Meals, Rhunette Ella Torres, DO, 800 mg at 02/16/21 0843    Insert peripheral IV, , , Once **AND** sodium chloride (PF) 0 9 % injection 3 mL, 3 mL, Intravenous, Q1H PRN, EL Roth    traZODone (DESYREL) tablet 25 mg, 25 mg, Oral, HS, EL Roth, 25 mg at 02/15/21 2128      Labs & Results:        Results from last 7 days   Lab Units 02/16/21 0458 02/14/21  0441 02/11/21  0514   WBC Thousand/uL 5 66 4 68 8 74   HEMOGLOBIN g/dL 8 1* 8 0* 8 6*   HEMATOCRIT % 26 3* 26 0* 27 8*   PLATELETS Thousands/uL 105* 100* 100*         Results from last 7 days   Lab Units 02/16/21 0458 02/13/21  0455 02/11/21  0514   POTASSIUM mmol/L 4 3 3 9 4 2   CHLORIDE mmol/L 107 108 103   CO2 mmol/L 28 31 33*   BUN mg/dL 102* 98* 97*   CREATININE mg/dL 3 45* 3 39* 3 76*   CALCIUM mg/dL 8 4 8 5 8 6     Results from last 7 days   Lab Units 02/16/21 0458 02/15/21  0510 02/14/21  0441   INR  2 17* 2 28* 2 26*         Counseling / Coordination of Care  Total floor / unit time spent today 20 minutes  Greater than 50% of total time was spent with the patient and / or family counseling and / or coordination of care  A description of the counseling / coordination of care: 20  Jenelle Skaggs

## 2021-02-16 NOTE — HEMODIALYSIS
Post-Dialysis RN Treatment Note    Blood Pressure:  Pre- 127/75 mm/Hg  Post-121/83 mmHg   EDW- TBD    Weight:  Pre- 76 7 kg   Post-76 6 kg   Mode of weight measurement: bed scale    Volume Removed-0, ran even   Treatment duration-150 minutes    NS given - NO   Treatment shortened? NO   Medications given during Rx- NA   Estimated Kt/V-0 70    Access type: R IJ catheter   Access Status: YES   Report called to primary nurse- Raquel Jackson RN     No issues to report-pt stable throughout tx

## 2021-02-16 NOTE — PLAN OF CARE
Problem: Potential for Falls  Goal: Patient will remain free of falls  Description: INTERVENTIONS:  - Assess patient frequently for physical needs  -  Identify cognitive and physical deficits and behaviors that affect risk of falls    -  [place] fall precautions as indicated by assessment   - Educate patient/family on patient safety including physical limitations  - Instruct patient to call for assistance with activity based on assessment  - Modify environment to reduce risk of injury  - Consider OT/PT consult to assist with strengthening/mobility  Outcome: Progressing     Problem: CARDIOVASCULAR - ADULT  Goal: Maintains optimal cardiac output and hemodynamic stability  Description: INTERVENTIONS:  - Monitor I/O, vital signs and rhythm  - Monitor for S/S and trends of decreased cardiac output  - Administer and titrate ordered vasoactive medications to optimize hemodynamic stability  - Assess quality of pulses, skin color and temperature  - Assess for signs of decreased coronary artery perfusion  - Instruct patient to report change in severity of symptoms  Outcome: Progressing     Problem: METABOLIC, FLUID AND ELECTROLYTES - ADULT  Goal: Electrolytes maintained within normal limits  Description: INTERVENTIONS:  - Monitor labs and assess patient for signs and symptoms of electrolyte imbalances  - Administer electrolyte replacement as ordered  - Monitor response to electrolyte replacements, including repeat lab results as appropriate  - Instruct patient on fluid and nutrition as appropriate  Outcome: Progressing  Goal: Fluid balance maintained  Description: INTERVENTIONS:  - Monitor labs   - Monitor I/O and WT  - Instruct patient on fluid and nutrition as appropriate  - Assess for signs & symptoms of volume excess or deficit  Outcome: Progressing     Problem: MUSCULOSKELETAL - ADULT  Goal: Maintain or return mobility to safest level of function  Description: INTERVENTIONS:  - Assess patient's ability to carry out ADLs; assess patient's baseline for ADL function and identify physical deficits which impact ability to perform ADLs (bathing, care of mouth/teeth, toileting, grooming, dressing, etc )  - Assess/evaluate cause of self-care deficits   - Assess range of motion  - Assess patient's mobility  - Assess patient's need for assistive devices and provide as appropriate  - Encourage maximum independence but intervene and supervise when necessary  - Involve family in performance of ADLs  - Assess for home care needs following discharge   - Consider OT consult to assist with ADL evaluation and planning for discharge  - Provide patient education as appropriate  Outcome: Progressing     Problem: Nutrition/Hydration-ADULT  Goal: Nutrient/Hydration intake appropriate for improving, restoring or maintaining nutritional needs  Description: Monitor and assess patient's nutrition/hydration status for malnutrition  Collaborate with interdisciplinary team and initiate plan and interventions as ordered  Monitor patient's weight and dietary intake as ordered or per policy  Utilize nutrition screening tool and intervene as necessary  Determine patient's food preferences and provide high-protein, high-caloric foods as appropriate       INTERVENTIONS:  - Monitor oral intake, urinary output, labs, and treatment plans  - Assess nutrition and hydration status and recommend course of action  - Evaluate amount of meals eaten  - Assist patient with eating if necessary   - Allow adequate time for meals  - Recommend/ encourage appropriate diets, oral nutritional supplements, and vitamin/mineral supplements  - Order, calculate, and assess calorie counts as needed  - Recommend, monitor, and adjust tube feedings and TPN/PPN based on assessed needs  - Assess need for intravenous fluids  - Provide specific nutrition/hydration education as appropriate  - Include patient/family/caregiver in decisions related to nutrition  Outcome: Progressing     Problem: PAIN - ADULT  Goal: Verbalizes/displays adequate comfort level or baseline comfort level  Description: Interventions:  - Encourage patient to monitor pain and request assistance  - Assess pain using appropriate pain scale  - Administer analgesics based on type and severity of pain and evaluate response  - Implement non-pharmacological measures as appropriate and evaluate response  - Consider cultural and social influences on pain and pain management  - Notify physician/advanced practitioner if interventions unsuccessful or patient reports new pain  Outcome: Progressing     Problem: INFECTION - ADULT  Goal: Absence or prevention of progression during hospitalization  Description: INTERVENTIONS:  - Assess and monitor for signs and symptoms of infection  - Monitor lab/diagnostic results  - Monitor all insertion sites, i e  indwelling lines, tubes, and drains  - Monitor endotracheal if appropriate and nasal secretions for changes in amount and color  - [place] appropriate cooling/warming therapies per order  - Administer medications as ordered  - Instruct and encourage patient and family to use good hand hygiene technique  - Identify and instruct in appropriate isolation precautions for identified infection/condition  Outcome: Progressing     Problem: SAFETY ADULT  Goal: Patient will remain free of falls  Description: INTERVENTIONS:  - Assess patient frequently for physical needs  -  Identify cognitive and physical deficits and behaviors that affect risk of falls    -  [place] fall precautions as indicated by assessment   - Educate patient/family on patient safety including physical limitations  - Instruct patient to call for assistance with activity based on assessment  - Modify environment to reduce risk of injury  - Consider OT/PT consult to assist with strengthening/mobility  Outcome: Progressing  Goal: Maintain or return to baseline ADL function  Description: INTERVENTIONS:  -  Assess patient's ability to carry out ADLs; assess patient's baseline for ADL function and identify physical deficits which impact ability to perform ADLs (bathing, care of mouth/teeth, toileting, grooming, dressing, etc )  - Assess/evaluate cause of self-care deficits   - Assess range of motion  - Assess patient's mobility; develop plan if impaired  - Assess patient's need for assistive devices and provide as appropriate  - Encourage maximum independence but intervene and supervise when necessary  - Involve family in performance of ADLs  - Assess for home care needs following discharge   - Consider OT consult to assist with ADL evaluation and planning for discharge  - Provide patient education as appropriate  Outcome: Progressing  Goal: Maintain or return mobility status to optimal level  Description: INTERVENTIONS:  - Assess patient's baseline mobility status (ambulation, transfers, stairs, etc )    - Identify cognitive and physical deficits and behaviors that affect mobility  - Identify mobility aids required to assist with transfers and/or ambulation (gait belt, sit-to-stand, lift, walker, cane, etc )  - [place] fall precautions as indicated by assessment  - Record patient progress and toleration of activity level on Mobility SBAR; progress patient to next Phase/Stage  - Instruct patient to call for assistance with activity based on assessment  - Consider rehabilitation consult to assist with strengthening/weightbearing, etc   Outcome: Progressing     Problem: DISCHARGE PLANNING  Goal: Discharge to home or other facility with appropriate resources  Description: INTERVENTIONS:  - Identify barriers to discharge w/patient and caregiver  - Arrange for needed discharge resources and transportation as appropriate  - Identify discharge learning needs (meds, wound care, etc )  - Arrange for interpretive services to assist at discharge as needed  - Refer to Case Management Department for coordinating discharge planning if the patient needs post-hospital services based on physician/advanced practitioner order or complex needs related to functional status, cognitive ability, or social support system  Outcome: Progressing     Problem: Knowledge Deficit  Goal: Patient/family/caregiver demonstrates understanding of disease process, treatment plan, medications, and discharge instructions  Description: Complete learning assessment and assess knowledge base    Interventions:  - Provide teaching at level of understanding  - Provide teaching via preferred learning methods  Outcome: Progressing

## 2021-02-16 NOTE — ASSESSMENT & PLAN NOTE
· Rate controlled on Lopressor    · Anticoagulated on Coumadin   · Coumadin on hold - awaiting removal of peritoneal dialysis catheter  · Monitor INR

## 2021-02-16 NOTE — PLAN OF CARE
Plan first hemodialysis for 150 minutes using 3 K+ bath for serum potassium 4 3 this morning  Fluid goal 500 ml to run even  Treatment review with Dr Luna Pickett at bedside        Problem: METABOLIC, FLUID AND ELECTROLYTES - ADULT  Goal: Electrolytes maintained within normal limits  Description: INTERVENTIONS:  - Monitor labs and assess patient for signs and symptoms of electrolyte imbalances  - Administer electrolyte replacement as ordered  - Monitor response to electrolyte replacements, including repeat lab results as appropriate  - Instruct patient on fluid and nutrition as appropriate  Outcome: Progressing  Goal: Fluid balance maintained  Description: INTERVENTIONS:  - Monitor labs   - Monitor I/O and WT  - Instruct patient on fluid and nutrition as appropriate  - Assess for signs & symptoms of volume excess or deficit  Outcome: Progressing

## 2021-02-17 ENCOUNTER — ANESTHESIA EVENT (INPATIENT)
Dept: RADIOLOGY | Facility: HOSPITAL | Age: 81
DRG: 981 | End: 2021-02-17
Payer: COMMERCIAL

## 2021-02-17 ENCOUNTER — ANESTHESIA (INPATIENT)
Dept: RADIOLOGY | Facility: HOSPITAL | Age: 81
DRG: 981 | End: 2021-02-17
Payer: COMMERCIAL

## 2021-02-17 ENCOUNTER — APPOINTMENT (INPATIENT)
Dept: RADIOLOGY | Facility: HOSPITAL | Age: 81
DRG: 981 | End: 2021-02-17
Payer: COMMERCIAL

## 2021-02-17 ENCOUNTER — APPOINTMENT (INPATIENT)
Dept: DIALYSIS | Facility: HOSPITAL | Age: 81
DRG: 981 | End: 2021-02-17
Attending: INTERNAL MEDICINE
Payer: COMMERCIAL

## 2021-02-17 VITALS
HEART RATE: 66 BPM | HEIGHT: 67 IN | DIASTOLIC BLOOD PRESSURE: 59 MMHG | WEIGHT: 165 LBS | TEMPERATURE: 97.7 F | RESPIRATION RATE: 16 BRPM | BODY MASS INDEX: 25.9 KG/M2 | OXYGEN SATURATION: 97 % | SYSTOLIC BLOOD PRESSURE: 95 MMHG

## 2021-02-17 VITALS — HEART RATE: 73 BPM

## 2021-02-17 LAB
GLUCOSE SERPL-MCNC: 82 MG/DL (ref 65–140)
HAPTOGLOB SERPL-MCNC: 47 MG/DL (ref 34–355)
INR PPP: 1.85 (ref 0.84–1.19)
PROTHROMBIN TIME: 21.2 SECONDS (ref 11.6–14.5)

## 2021-02-17 PROCEDURE — 85610 PROTHROMBIN TIME: CPT | Performed by: INTERNAL MEDICINE

## 2021-02-17 PROCEDURE — 90935 HEMODIALYSIS ONE EVALUATION: CPT | Performed by: INTERNAL MEDICINE

## 2021-02-17 PROCEDURE — 99239 HOSP IP/OBS DSCHRG MGMT >30: CPT | Performed by: INTERNAL MEDICINE

## 2021-02-17 PROCEDURE — 5A1D70Z PERFORMANCE OF URINARY FILTRATION, INTERMITTENT, LESS THAN 6 HOURS PER DAY: ICD-10-PCS | Performed by: INTERNAL MEDICINE

## 2021-02-17 PROCEDURE — 0WPG33Z REMOVAL OF INFUSION DEVICE FROM PERITONEAL CAVITY, PERCUTANEOUS APPROACH: ICD-10-PCS | Performed by: RADIOLOGY

## 2021-02-17 PROCEDURE — 49422 REMOVE TUNNELED IP CATH: CPT

## 2021-02-17 PROCEDURE — 82948 REAGENT STRIP/BLOOD GLUCOSE: CPT

## 2021-02-17 PROCEDURE — 49422 REMOVE TUNNELED IP CATH: CPT | Performed by: RADIOLOGY

## 2021-02-17 RX ORDER — SODIUM CHLORIDE 9 MG/ML
INJECTION, SOLUTION INTRAVENOUS CONTINUOUS PRN
Status: DISCONTINUED | OUTPATIENT
Start: 2021-02-17 | End: 2021-02-17

## 2021-02-17 RX ORDER — FENTANYL CITRATE 50 UG/ML
INJECTION, SOLUTION INTRAMUSCULAR; INTRAVENOUS AS NEEDED
Status: DISCONTINUED | OUTPATIENT
Start: 2021-02-17 | End: 2021-02-17

## 2021-02-17 RX ORDER — LIDOCAINE HYDROCHLORIDE AND EPINEPHRINE 10; 10 MG/ML; UG/ML
INJECTION, SOLUTION INFILTRATION; PERINEURAL CODE/TRAUMA/SEDATION MEDICATION
Status: COMPLETED | OUTPATIENT
Start: 2021-02-17 | End: 2021-02-17

## 2021-02-17 RX ORDER — CEFAZOLIN SODIUM 1 G/3ML
INJECTION, POWDER, FOR SOLUTION INTRAMUSCULAR; INTRAVENOUS AS NEEDED
Status: DISCONTINUED | OUTPATIENT
Start: 2021-02-17 | End: 2021-02-17

## 2021-02-17 RX ORDER — PROPOFOL 10 MG/ML
INJECTION, EMULSION INTRAVENOUS CONTINUOUS PRN
Status: DISCONTINUED | OUTPATIENT
Start: 2021-02-17 | End: 2021-02-17

## 2021-02-17 RX ORDER — PROPOFOL 10 MG/ML
INJECTION, EMULSION INTRAVENOUS AS NEEDED
Status: DISCONTINUED | OUTPATIENT
Start: 2021-02-17 | End: 2021-02-17

## 2021-02-17 RX ORDER — FENTANYL CITRATE/PF 50 MCG/ML
25 SYRINGE (ML) INJECTION
Status: DISCONTINUED | OUTPATIENT
Start: 2021-02-17 | End: 2021-02-17 | Stop reason: HOSPADM

## 2021-02-17 RX ORDER — EPHEDRINE SULFATE 50 MG/ML
INJECTION INTRAVENOUS AS NEEDED
Status: DISCONTINUED | OUTPATIENT
Start: 2021-02-17 | End: 2021-02-17

## 2021-02-17 RX ORDER — ONDANSETRON 2 MG/ML
4 INJECTION INTRAMUSCULAR; INTRAVENOUS ONCE AS NEEDED
Status: DISCONTINUED | OUTPATIENT
Start: 2021-02-17 | End: 2021-02-17 | Stop reason: HOSPADM

## 2021-02-17 RX ORDER — LIDOCAINE HYDROCHLORIDE 10 MG/ML
INJECTION, SOLUTION EPIDURAL; INFILTRATION; INTRACAUDAL; PERINEURAL AS NEEDED
Status: DISCONTINUED | OUTPATIENT
Start: 2021-02-17 | End: 2021-02-17

## 2021-02-17 RX ORDER — ONDANSETRON 2 MG/ML
INJECTION INTRAMUSCULAR; INTRAVENOUS AS NEEDED
Status: DISCONTINUED | OUTPATIENT
Start: 2021-02-17 | End: 2021-02-17

## 2021-02-17 RX ORDER — CHOLECALCIFEROL (VITAMIN D3) 10 MCG
1 TABLET ORAL
Qty: 30 CAPSULE | Refills: 0 | Status: SHIPPED | OUTPATIENT
Start: 2021-02-17 | End: 2022-01-01

## 2021-02-17 RX ADMIN — PROPOFOL 20 MG: 10 INJECTION, EMULSION INTRAVENOUS at 13:29

## 2021-02-17 RX ADMIN — SODIUM CHLORIDE: 9 INJECTION, SOLUTION INTRAVENOUS at 13:20

## 2021-02-17 RX ADMIN — EPHEDRINE SULFATE 5 MG: 50 INJECTION, SOLUTION INTRAVENOUS at 14:08

## 2021-02-17 RX ADMIN — SENNOSIDES AND DOCUSATE SODIUM 1 TABLET: 8.6; 5 TABLET ORAL at 08:46

## 2021-02-17 RX ADMIN — LIDOCAINE HYDROCHLORIDE,EPINEPHRINE BITARTRATE 20 ML: 10; .01 INJECTION, SOLUTION INFILTRATION; PERINEURAL at 13:45

## 2021-02-17 RX ADMIN — IRON SUCROSE 100 MG: 20 INJECTION, SOLUTION INTRAVENOUS at 10:21

## 2021-02-17 RX ADMIN — FENTANYL CITRATE 25 MCG: 50 INJECTION INTRAMUSCULAR; INTRAVENOUS at 13:28

## 2021-02-17 RX ADMIN — PHENYLEPHRINE HYDROCHLORIDE 20 MCG/MIN: 10 INJECTION INTRAVENOUS at 13:35

## 2021-02-17 RX ADMIN — GUAIFENESIN 600 MG: 600 TABLET, EXTENDED RELEASE ORAL at 08:46

## 2021-02-17 RX ADMIN — PROPOFOL 80 MCG/KG/MIN: 10 INJECTION, EMULSION INTRAVENOUS at 13:28

## 2021-02-17 RX ADMIN — LIDOCAINE HYDROCHLORIDE 80 MG: 10 INJECTION, SOLUTION EPIDURAL; INFILTRATION; INTRACAUDAL; PERINEURAL at 13:28

## 2021-02-17 RX ADMIN — ONDANSETRON 4 MG: 2 INJECTION INTRAMUSCULAR; INTRAVENOUS at 13:28

## 2021-02-17 RX ADMIN — CEFAZOLIN 1000 MG: 1 INJECTION, POWDER, FOR SOLUTION INTRAVENOUS at 13:45

## 2021-02-17 RX ADMIN — EPHEDRINE SULFATE 5 MG: 50 INJECTION, SOLUTION INTRAVENOUS at 13:35

## 2021-02-17 RX ADMIN — EPHEDRINE SULFATE 5 MG: 50 INJECTION, SOLUTION INTRAVENOUS at 13:38

## 2021-02-17 RX ADMIN — ATORVASTATIN CALCIUM 40 MG: 40 TABLET, FILM COATED ORAL at 08:46

## 2021-02-17 NOTE — CASE MANAGEMENT
CM rec'd call from Cedric De at [facility] that there is an available chair time on MWF at 11:30 am  CM confirmed with Dr Marisa Arceo in Nephrology that pt can change chair time  Dr Marisa Arceo approved  CM notified pt's daughter Kathy Madrid, [phone]  Kathy Madrid accepted new chair time  CM notified Cedric De from [facility] [phone] that pt has accepted chair time  Cedric De requested orders be sent today  CM notified Dr Marisa Arceo that the orders need to be sent today  Pt expected dc to be today following cath removal      Family to  pt around 6 pm     No other CM needs noted

## 2021-02-17 NOTE — HEMODIALYSIS
Post-Dialysis RN Treatment Note    Blood Pressure:  Pre 122/80mm/Hg  Post 128/74 mmHg   EDW  TBD kg    Weight:  Pre 75 2 kg kg   Post 75 2 kg   Mode of weight measurement: Bed Scale   Volume Removed  500 ml    Treatment duration 180minutes    NS given  180    Treatment shortened?  no   Medications given during Rx venofer 100 mg   Estimated Kt/V  1 09   Access type: Permacath/TDC   Access Status: Yes, describe:     Report called to primary nurse   Yes Janna Elizondo RN  Going to [facility] as an Outpatient TTS

## 2021-02-17 NOTE — CASE MANAGEMENT
CM spoke with pt's daughter, Mark Chambers who is requesting a later time for pt  CM stated she would call [facility] [address] [place] [address] [phone] to see if they can arrange a later time  CM called [facility] and requested for a later time  Otto Chang from [facility] states there are no other times available  CM notified Mark Chambers and has accepted 6:45 am time

## 2021-02-17 NOTE — PLAN OF CARE
Problem: Potential for Falls  Goal: Patient will remain free of falls  Description: INTERVENTIONS:  - Assess patient frequently for physical needs  -  Identify cognitive and physical deficits and behaviors that affect risk of falls    -  [place] fall precautions as indicated by assessment   - Educate patient/family on patient safety including physical limitations  - Instruct patient to call for assistance with activity based on assessment  - Modify environment to reduce risk of injury  - Consider OT/PT consult to assist with strengthening/mobility  Outcome: Progressing     Problem: CARDIOVASCULAR - ADULT  Goal: Maintains optimal cardiac output and hemodynamic stability  Description: INTERVENTIONS:  - Monitor I/O, vital signs and rhythm  - Monitor for S/S and trends of decreased cardiac output  - Administer and titrate ordered vasoactive medications to optimize hemodynamic stability  - Assess quality of pulses, skin color and temperature  - Assess for signs of decreased coronary artery perfusion  - Instruct patient to report change in severity of symptoms  Outcome: Progressing     Problem: METABOLIC, FLUID AND ELECTROLYTES - ADULT  Goal: Electrolytes maintained within normal limits  Description: INTERVENTIONS:  - Monitor labs and assess patient for signs and symptoms of electrolyte imbalances  - Administer electrolyte replacement as ordered  - Monitor response to electrolyte replacements, including repeat lab results as appropriate  - Instruct patient on fluid and nutrition as appropriate  Outcome: Progressing  Goal: Fluid balance maintained  Description: INTERVENTIONS:  - Monitor labs   - Monitor I/O and WT  - Instruct patient on fluid and nutrition as appropriate  - Assess for signs & symptoms of volume excess or deficit  Outcome: Progressing     Problem: MUSCULOSKELETAL - ADULT  Goal: Maintain or return mobility to safest level of function  Description: INTERVENTIONS:  - Assess patient's ability to carry out ADLs; assess patient's baseline for ADL function and identify physical deficits which impact ability to perform ADLs (bathing, care of mouth/teeth, toileting, grooming, dressing, etc )  - Assess/evaluate cause of self-care deficits   - Assess range of motion  - Assess patient's mobility  - Assess patient's need for assistive devices and provide as appropriate  - Encourage maximum independence but intervene and supervise when necessary  - Involve family in performance of ADLs  - Assess for home care needs following discharge   - Consider OT consult to assist with ADL evaluation and planning for discharge  - Provide patient education as appropriate  Outcome: Progressing     Problem: Nutrition/Hydration-ADULT  Goal: Nutrient/Hydration intake appropriate for improving, restoring or maintaining nutritional needs  Description: Monitor and assess patient's nutrition/hydration status for malnutrition  Collaborate with interdisciplinary team and initiate plan and interventions as ordered  Monitor patient's weight and dietary intake as ordered or per policy  Utilize nutrition screening tool and intervene as necessary  Determine patient's food preferences and provide high-protein, high-caloric foods as appropriate       INTERVENTIONS:  - Monitor oral intake, urinary output, labs, and treatment plans  - Assess nutrition and hydration status and recommend course of action  - Evaluate amount of meals eaten  - Assist patient with eating if necessary   - Allow adequate time for meals  - Recommend/ encourage appropriate diets, oral nutritional supplements, and vitamin/mineral supplements  - Order, calculate, and assess calorie counts as needed  - Recommend, monitor, and adjust tube feedings and TPN/PPN based on assessed needs  - Assess need for intravenous fluids  - Provide specific nutrition/hydration education as appropriate  - Include patient/family/caregiver in decisions related to nutrition  Outcome: Progressing     Problem: PAIN - ADULT  Goal: Verbalizes/displays adequate comfort level or baseline comfort level  Description: Interventions:  - Encourage patient to monitor pain and request assistance  - Assess pain using appropriate pain scale  - Administer analgesics based on type and severity of pain and evaluate response  - Implement non-pharmacological measures as appropriate and evaluate response  - Consider cultural and social influences on pain and pain management  - Notify physician/advanced practitioner if interventions unsuccessful or patient reports new pain  Outcome: Progressing     Problem: INFECTION - ADULT  Goal: Absence or prevention of progression during hospitalization  Description: INTERVENTIONS:  - Assess and monitor for signs and symptoms of infection  - Monitor lab/diagnostic results  - Monitor all insertion sites, i e  indwelling lines, tubes, and drains  - Monitor endotracheal if appropriate and nasal secretions for changes in amount and color  - [place] appropriate cooling/warming therapies per order  - Administer medications as ordered  - Instruct and encourage patient and family to use good hand hygiene technique  - Identify and instruct in appropriate isolation precautions for identified infection/condition  Outcome: Progressing     Problem: SAFETY ADULT  Goal: Patient will remain free of falls  Description: INTERVENTIONS:  - Assess patient frequently for physical needs  -  Identify cognitive and physical deficits and behaviors that affect risk of falls    -  [place] fall precautions as indicated by assessment   - Educate patient/family on patient safety including physical limitations  - Instruct patient to call for assistance with activity based on assessment  - Modify environment to reduce risk of injury  - Consider OT/PT consult to assist with strengthening/mobility  Outcome: Progressing  Goal: Maintain or return to baseline ADL function  Description: INTERVENTIONS:  -  Assess patient's ability to carry out ADLs; assess patient's baseline for ADL function and identify physical deficits which impact ability to perform ADLs (bathing, care of mouth/teeth, toileting, grooming, dressing, etc )  - Assess/evaluate cause of self-care deficits   - Assess range of motion  - Assess patient's mobility; develop plan if impaired  - Assess patient's need for assistive devices and provide as appropriate  - Encourage maximum independence but intervene and supervise when necessary  - Involve family in performance of ADLs  - Assess for home care needs following discharge   - Consider OT consult to assist with ADL evaluation and planning for discharge  - Provide patient education as appropriate  Outcome: Progressing  Goal: Maintain or return mobility status to optimal level  Description: INTERVENTIONS:  - Assess patient's baseline mobility status (ambulation, transfers, stairs, etc )    - Identify cognitive and physical deficits and behaviors that affect mobility  - Identify mobility aids required to assist with transfers and/or ambulation (gait belt, sit-to-stand, lift, walker, cane, etc )  - [place] fall precautions as indicated by assessment  - Record patient progress and toleration of activity level on Mobility SBAR; progress patient to next Phase/Stage  - Instruct patient to call for assistance with activity based on assessment  - Consider rehabilitation consult to assist with strengthening/weightbearing, etc   Outcome: Progressing     Problem: DISCHARGE PLANNING  Goal: Discharge to home or other facility with appropriate resources  Description: INTERVENTIONS:  - Identify barriers to discharge w/patient and caregiver  - Arrange for needed discharge resources and transportation as appropriate  - Identify discharge learning needs (meds, wound care, etc )  - Arrange for interpretive services to assist at discharge as needed  - Refer to Case Management Department for coordinating discharge planning if the patient needs post-hospital services based on physician/advanced practitioner order or complex needs related to functional status, cognitive ability, or social support system  Outcome: Progressing     Problem: Knowledge Deficit  Goal: Patient/family/caregiver demonstrates understanding of disease process, treatment plan, medications, and discharge instructions  Description: Complete learning assessment and assess knowledge base    Interventions:  - Provide teaching at level of understanding  - Provide teaching via preferred learning methods  Outcome: Progressing

## 2021-02-17 NOTE — ANESTHESIA POSTPROCEDURE EVALUATION
Post-Op Assessment Note    CV Status:  Stable  Pain Score: 0    Pain management: adequate     Mental Status:  Alert and awake   Hydration Status:  Euvolemic   PONV Controlled:  Controlled   Airway Patency:  Patent      Post Op Vitals Reviewed: Yes      Staff: CRNA         No complications documented      BP   107/57   Temp   98 4   Pulse  75   Resp   13   SpO2   96%

## 2021-02-17 NOTE — ASSESSMENT & PLAN NOTE
Continue bumex  Metoprolol on hold due to bradycardia  Cardiology input noted  Now on dialysis  Outpatient Cardiology follow-up

## 2021-02-17 NOTE — UTILIZATION REVIEW
Continued Stay Review    Date: 2/17/21                 Current Patient Class: Inpatient Current Level of Care: Med Surg    HPI:80 y o  male initially admitted on on 1/26/21 with Acute on chronic dCHF, stage 4 CKD, A fib, anemia, HLD, dissection thoracic aorta, bradycardia  Patient's original PD catheter was not draining well, and was replaced on 2/9  Peritoneal dialysis was re-started on 2/10  Heparin drip was discontinued on 2/9 and patient was started on coumadin on 2/10  On 2/11 Case Management was informed by patient's daughter that a friend will be able to provide PD support to the patient at home  On 2/13, Case Management noted that the family member who was going to assist the patient is no longer available  Patient now wants HD  Patient had PermCath placed on 2/15  Planned for removal of PD catheter on 2/16,  but INR was 2 17  Interventional Radiology needs INR to be below 2, preferably 1 5  Coumadin held  Patient underwent hemodialysis on 2/16 2/17 Patient received HD today  Planned for PD catheter removal by Interventional Radiology today  Case Management has chair time arranged at [address]       Pertinent Labs/Diagnostic Results:   Results from last 7 days   Lab Units 02/12/21  1642   SARS-COV-2  Negative     Results from last 7 days   Lab Units 02/16/21  0458 02/14/21  0441 02/11/21  0514   WBC Thousand/uL 5 66 4 68 8 74   HEMOGLOBIN g/dL 8 1* 8 0* 8 6*   HEMATOCRIT % 26 3* 26 0* 27 8*   PLATELETS Thousands/uL 105* 100* 100*   NEUTROS ABS Thousands/µL 3 52  --   --          Results from last 7 days   Lab Units 02/16/21  0458 02/13/21  0455 02/11/21  0514   SODIUM mmol/L 141 142 140   POTASSIUM mmol/L 4 3 3 9 4 2   CHLORIDE mmol/L 107 108 103   CO2 mmol/L 28 31 33*   ANION GAP mmol/L 6 3* 4   BUN mg/dL 102* 98* 97*   CREATININE mg/dL 3 45* 3 39* 3 76*   EGFR ml/min/1 73sq m 16 16 14   CALCIUM mg/dL 8 4 8 5 8 6   PHOSPHORUS mg/dL 3 8  --   --              Results from last 7 days Lab Units 02/16/21  0458 02/13/21  0455 02/11/21  0514   GLUCOSE RANDOM mg/dL 80 80 117             Results from last 7 days   Lab Units 02/17/21  1020 02/16/21  0458 02/15/21  0510   PROTIME seconds 21 2* 24 0* 25 0*   INR  1 85* 2 17* 2 28*             Results from last 7 days   Lab Units 02/12/21  1642   INFLUENZA A PCR  Negative   INFLUENZA B PCR  Negative   RSV PCR  Negative         Vital Signs:     Date/Time  Temp  Pulse  Resp  BP  MAP  SpO2  Calculated FIO2 (%) - Nasal Cannula  Nasal Cannula O2 Flow Rate (L/min)  O2 Device    02/17/21 1230  97 6 °F (36 4 °C)  60  18  128/74  91  --  --  --  --    02/17/21 1200  --  67  17  133/76  94  --  --  --  --    02/17/21 1130  --  60  17  128/82  96  --  --  --  --    02/17/21 1100  --  55  17  131/82  98  --  --  --  --    02/17/21 1030  --  60  17  128/74  91  --  --  --  --    02/17/21 1000  --  85  17  120/69  85  --  --  --  --    02/17/21 0935  --  85  17  122/98  85  --  --  --  --    02/17/21 0930  97 9 °F (36 6 °C)  65  17  122/80  91  --  --  --  --    02/17/21 07:25:48  98 3 °F (36 8 °C)  80  16  111/70  84  98 %  --  --  None (Room air)    02/16/21 22:04:33  98 7 °F (37 1 °C)  --  16  121/66  84  --  --  --  --    02/16/21 1600  98 1 °F (36 7 °C)  94  16  121/83  --  --  --  --  --    02/16/21 1530  --  89  16  121/77  --  --  --  --  --    02/16/21 1500  --  86  16  119/80  --  --  --  --  --    02/16/21 1430  --  85  16  132/88  --  --  --  --  --    02/16/21 1400  98 5 °F (36 9 °C)  87  16  120/81  --  --  --  --  --    02/16/21 1340  --  87  16  119/77  --  --  --  --  --    02/16/21 1335  --  77  16  127/75  --  --  --  --  --    02/16/21 08:11:31  --  70  --  117/62  80  98 %  --  --  None (Room air)    02/15/21 22:30:40  98 3 °F (36 8 °C)  66  16  122/63  83  100 %  --  --  --    02/15/21 17:51:04  --  69  --  95/57  70  98 %  --  --  --    02/15/21 17:20:44  --  --  15  111/63  79  --  --  --  --    02/15/21 1626  --  79  16  108/58  --  96 %  -- --  None (Room air)    02/15/21 1621  --  78  16  117/58  --  97 %  --  --  None (Room air)    02/15/21 1616  --  76  16  120/56  --  100 %  --  --  None (Room air)    02/15/21 1611  --  121Abnormal   16  125/61  --  99 %  32  3 L/min  Nasal cannula    02/15/21 1606  --  67  16  117/58  --  100 %  32  3 L/min  Nasal cannula    02/15/21 1601  --  64  16  119/63  --  100 %  32  3 L/min  Nasal cannula    02/15/21 1600  --  --  16  --  --  --  32  3 L/min  Nasal cannula    02/15/21 1556  --  62  16  128/66  --  100 %  32  3 L/min  Nasal cannula    02/15/21 1551  --  62  16  135/68  --  100 %  32  3 L/min  Nasal cannula    02/15/21 1546  --  63  16  139/67  --  100 %  32  3 L/min  Nasal cannula    02/15/21 15:44:16  --  64  16  160/77  --  100 %  --  --  None (Room air)    02/15/21 07:08:58  97 5 °F (36 4 °C)  73  16  126/67  87  96 %  --  --  --                Medications:   Scheduled Medications:  atorvastatin, 40 mg, Oral, Daily  b complex-vitamin C-folic acid, 1 capsule, Oral, Daily With Dinner  bumetanide, 4 mg, Oral, BID  guaiFENesin, 600 mg, Oral, Q12H FILIBERTO  iron sucrose, 100 mg, Intravenous, Daily  melatonin, 6 mg, Oral, HS  polyethylene glycol, 17 g, Oral, Daily  senna-docusate sodium, 1 tablet, Oral, BID  traZODone, 25 mg, Oral, HS      Continuous IV Infusions: None     PRN Meds:  acetaminophen, 650 mg, Oral, Q6H PRN  benzonatate, 100 mg, Oral, TID PRN  HYDROmorphone, 0 2 mg, Intravenous, Q6H PRN  ondansetron, 4 mg, Intravenous, Q6H PRN  oxyCODONE, 10 mg, Oral, Q4H PRN  oxyCODONE, 5 mg, Oral, Q4H PRN  sodium chloride (PF), 3 mL, Intravenous, Q1H PRN        Discharge Plan: TBD          Network Utilization Review Department  ATTENTION: Please call with any questions or concerns to [phone] and carefully listen to the prompts so that you are directed to the right person   All voicemails are confidential   [place] [facility] all requests for admission clinical reviews, approved or denied determinations and any other requests to dedicated fax number below belonging to the campus where the patient is receiving treatment   List of dedicated fax numbers for the Facilities:  [address] DENIALS (Administrative/Medical Necessity) [phone]   [address]  (Maternity/NICU/Pediatrics) [phone] [address] Nimesh Lau 0753 (  Enzo Mcnulty "Manuela" 103) [address] [address] Sylvia Marie 14[address] [facility]) [address] [place], [place] [address] [phone]

## 2021-02-17 NOTE — ASSESSMENT & PLAN NOTE
Lab Results   Component Value Date    EGFR 16 02/16/2021    EGFR 16 02/13/2021    EGFR 14 02/11/2021    CREATININE 3 45 (H) 02/16/2021    CREATININE 3 39 (H) 02/13/2021    CREATININE 3 76 (H) 02/11/2021     · Baseline creatinine 2-2 9 approximately    4 19 on presentation, now ESRD  · Holding norvasc 2/2 soft BP  · Known to Dr Jenn Daniels as outpatient-was on HD in past  · Patient will be switched to hemodialysis   · HD catheter in place  · Dialysis per Nephrology    Discussed with nephrology  Peritoneal dialysis catheter removed

## 2021-02-17 NOTE — ANESTHESIA PREPROCEDURE EVALUATION
Procedure:  IR PERITONEAL DIALYSIS CATHETER REMOVAL    Relevant Problems   CARDIO   (+) Atrial fibrillation (HCC)   (+) Benign essential hypertension   (+) GONZALES (dyspnea on exertion)   (+) Dissection of thoracic aorta (HCC)   (+) Hyperlipidemia   (+) Paroxysmal atrial fibrillation (HCC)      ENDO   (+) 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 (HCC)      /RENAL   (+) Acute renal failure superimposed on stage 4 chronic kidney disease (HCC)   (+) Benign prostatic hyperplasia without lower urinary tract symptoms   (+) Chronic kidney disease-mineral and bone disorder   (+) ESRD (end stage renal disease) (HCC)      HEMATOLOGY   (+) Anemia      MUSCULOSKELETAL   (+) Lumbar pain   (+) Rheumatoid arthritis (HCC)   (+) Secondary osteoarthritis of right shoulder      PULMONARY   (+) GONZALES (dyspnea on exertion)        Physical Exam    Airway    Mallampati score: II  TM Distance: >3 FB  Neck ROM: full     Dental       Cardiovascular  Rhythm: regular, Rate: normal, Cardiovascular exam normal    Pulmonary  Breath sounds clear to auscultation,     Other Findings        Anesthesia Plan  ASA Score- 3     Anesthesia Type- general with ASA Monitors  Additional Monitors:   Airway Plan: ETT  Plan Factors-Exercise tolerance (METS): >4 METS  Chart reviewed  EKG reviewed  Imaging results reviewed  Existing labs reviewed  Patient summary reviewed  Induction- intravenous  Postoperative Plan- Plan for postoperative opioid use  Planned trial extubation    Informed Consent- Anesthetic plan and risks discussed with patient  I personally reviewed this patient with the CRNA  Discussed and agreed on the Anesthesia Plan with the CRNA  Daniel Santizo

## 2021-02-17 NOTE — DISCHARGE INSTRUCTIONS
Perma-cath Placement   WHAT YOU NEED TO KNOW:   A perma-cath is a catheter placed through a vein into or near your right atrium  Your right atrium is the right upper chamber of your heart  A perma-cath is used for dialysis in an emergency or until a long-term device is ready to use  After your procedure, you will have some pain and swelling on your chest and neck  You may have some bruises on your chest and neck  You may also have 2 dressings, one on your chest and one on your neck  DISCHARGE INSTRUCTIONS:   Call 911 for any of the following:   · You feel lightheaded, short of breath, and have chest pain  · Your catheter comes out   Contact Interventional Radiology at [phone] Sl[place] PATIENTS: Contact Interventional Radiology at [phone]) Pancho Mcdonnell PATIENTS: Contact Interventional Radiology at [phone]) if:  · Blood soaks through your bandage  · You have new swelling in your arm, neck, face, or chest on your right side  · Your catheter gets wet  · Your bruises or pain get worse  · You have a fever or chills  · Persistent nausea or vomiting  · Your incision is red, swollen, or draining pus  · You have questions or concerns about your condition or care  Self-care:       · Resume your normal diet  · Keep your dressings dry  Do not take a shower or swim  You may take a tub bath, but do not get your dressings wet  Water in your wound can cause bacteria to grow and cause an infection  If your dressing gets wet, dry it off and cover it with dry sterile gauze  Call your healthcare provider  Do not use soaps or ointments  · Do not change your dressings  Your healthcare provider or dialysis nurse will change your dressings  Your dressings should stay in place until your healthcare provider removes them  The dressing on your chest will stay as long as you have the catheter in place  The dressing prevents infection  · Do not remove the red and blue caps from the end of your catheter   The caps prevent air from getting into your catheter  Follow up with your healthcare provider as directed: Write down your questions so you remember to ask them during your visits                    Diálisis peritoneal   CUIDADO AMBULATORIO:   La diálisis peritoneal se realiza para remover desechos, químicos y líquido adicional del cuerpo  Se coloca un líquido que se conoce efren dializador dentro del abdomen a través de un catéter (un tubo hannah)  El líquido permanece en paez abdomen jon varias horas a la vez  Dolores se llama tiempo de permanencia  El [employer], químicos y líquido adicional de la emmy a través del peritoneo  El peritoneo es un revestimiento hannah en el interior del abdomen  El peritoneo funciona efren un filtro conforme pasan los desechos a través de Juan José  El proceso de llenar y vaciar el abdomen con dializador se conoce efren intercambio  Llame a paez médico o especialista en diálisis si:  · Tiene dificultad para respirar o un dolor sordo en el abdomen mientras hace un intercambio de diálisis  · El dializador no fluye correctamente dentro o fuera de paez abdomen jon un intercambio  · El catéter tiene patric grieta o agujero o se salió parte o completamente del abdomen  · Usted tiene estreñimiento, dolor de BJURHOLM y está vomitando  · Patric nueva protuberancia creció en el abdomen desde que comenzó a hacer los intercambios para DPCA  · El [place] de salida del catéter está thom, sensible, adolorida o drena líquidos o pus  · El dializador saliendo del abdomen se ve turbio  · El área de salida está más florinda de lo que estaba  · Presenta un aumento de peso repentino o tiene los brazos o las piernas hinchados  · Usted tiene fiebre o escalofríos  · Usted tiene preguntas o inquietudes acerca de paez condición o cuidado      Tipos de diálisis peritoneal y cómo se realizan:  · La diálisis peritoneal continua ambulatoria (DPCA) significa que usted hace los intercambios a mano  El intercambio dura unos 40 minutos  La solución permanecerá en patrick abdomen jon al menos 4 a 6 horas  Es posible que necesite cambiar la solución 4 o más veces al día  Usted puede dormir con la solución en patrick abdomen jon toda la noche sin tener que despertarse para un intercambio  · La diálisis peritoneal automatizada (DPA) Hermelinda Kasia patric máquina llamada cicladora  Coloca el dializador dentro del abdomen y lo drena después de que el intercambio se Price  Usted podría realizar 1 intercambio a mano que permite que el dializador permanezca en el abdomen jon el día  Por la noche, usted puede conectar el catéter a la cicladora para drenarlo  Los intercambios de diálisis peritoneal también se realizarán mientras usted duerme  Si usted duerme de 8 a 9 horas, la máquina podría realizar de 3 a 5 intercambios jon chantel tiempo  El equipo de diálisis le mostrará efren preparar y usar la cicladora  Cómo prepararse para hacer un intercambio de DPCA: Los intercambios deberían realizarse en patric habitación con buena iluminación  No debería salud animales, caspa, vientos laura ni ventiladores en la habitación  Estos podrían aumentar el riesgo de [place]  · Reúna es instrumentos  ? Bolsa de dializador y bolsa para desecho    ? Base para IV (se [place] para colgar la bolsa del dializador)    ? Tubo en forma de Y y pinzas de tubo    ? Jannettekeshia Bang y Padma Jasso    ? [place] de plástico nueva sin la aguja (si es necesario)    · [employer] las autumn con agua y [place]  Lávese las autumn al menos por 15 segundos  Séquese josie las autumn con patric toalla limpia o toalla de papel nueva  Patrick médico le puede recomendar que use un gel a base de alcohol después que se seque las autumn  El gel servirá para matar cualquier bacteria que quede después de lavarse las autumn  · Póngase la máscara y [employer]  Asegúrese que la máscara le cubra la nariz y la boca   [place] ayudará a [employer] gérmenes de paez Johnny [place] o boca lleguen al área de salida  Póngase los guantes  No toque nada que no sea el vendaje y las provisiones [address] tenga los guantes puestos  Cómo realizar un intercambio de DPCA:  · Enjuague el tubo con dializador  Conecte el extremo inferior del tubo en Y a paez catéter  Conecte los otros 2 extremos del tubo a la bolsa de dializador y a la bolsa de desechos  Sujete el tubo que va a paez abdomen  Permita que 991 mililitros (mL) de dializador fresco fluyan fuera de la bolsa  Deje que drene por el tubo dentro de la bolsa de desechos  Luego sujete el tubo que va a la bolsa de desechos  · Permita que el dializador corra dentro del abdomen  Cuelgue la bolsa a un nivel más alto del abdomen  Retire la pinza para permitir que el bhavana del dializador corra dentro del abdomen  [place] no debería tamiko más de 10 minutos  Usted se puede acostar, sentar o estar de pie mientras el dializador ingresa  Brielle vez que todo el dializador esté en el abdomen, lávese las autumn y [place] un par de [employer]  Desconecte el catéter de la tubería  Sujete el catéter con la pinza  Mantenga el dializador en el abdomen de 3 a 5 horas en tiempo de espera  · Drene el dializador fuera de paez abdomen  Lávese las autumn nuevamente  Póngase la máscara y los guantes nuevos  Conecte la tubería en forma de Y al catéter  Sujete el tubo que va a la bolsa del dializador  Cuelgue la bolsa a un nivel más bajo que el abdomen  Remueva la pinza de la tubería que conduce a la bolsa de desechos  Permita que el dializador drene del abdomen dentro de la bolsa de desecho  Debería tamiko menos de 45 minutos para drenar el dializador fuera del abdomen  El [employer] fuera, debería estar vadim  Luego cierre la bolsa de des[facility] Charlotte[facility] y [address] [facility] instrucciones  Lávese [facility]  Lo que usted necesita saber acerca del aumento de peso: El aumento de peso puede deberse al azúcar extra que usted obtendrá del dializador   [place] instrucciones para patric dieta de diálisis  La dieta lo ayudará a [employer] nutrientes y las calorías que correspondan  El aumento de peso también puede ocurrir si paez cuerpo está reteniendo líquidos  Damari lo siguiente para controlar el aumento de peso debido al exceso de líquido:  · Limite los líquidos efren se lo indique paez Lucia Labrum un registro escrito de la cantidad de líquidos que usted consume y [place] cada día  [place] se llama ingesta y salida  · Pésese todos los días  Un aumento rápido de peso puede ser un signo de retención de líquidos  Use la misma báscula en el mismo sitio cada vez  Pésese después de ir al baño adrian antes de comer  Lleve un registro de paez peso cada día  · Lleve es registros de ingesta y salida y de peso diario a las visitas de seguimiento  Paez médico le dirá si usted tiene demasiado líquido en paez cuerpo y qué hacer para corregirlo  Acuda a las consultas de control con paez médico o especialista en diálisis según le indicaron: Anote es preguntas para que se acuerde de hacerlas jon es visitas  © Copyright [address] Information is for End User's use only and may not be sold, redistributed or otherwise used for commercial purposes  All illustrations and images included in CareNotes® are the copyrighted property of A D A M , Inc  or 0 [facility] es sólo para uso en educación  Paez intención no es darle un consejo médico sobre enfermedades o tratamientos  Colsulte con paez [place] Shanda farmacéutico antes de seguir cualquier régimen médico para saber si es seguro y efectivo para usted  Diálisis peritoneal   LO QUE NECESITA SABER:   La diálisis peritoneal se realiza para remover desechos, químicos y líquido adicional del cuerpo  El proceso de llenar y vaciar el abdomen con dializador se conoce efren intercambio  Los [employer] enseñarán a usted y a paez hamida cómo hacer intercambios a mano o con patric máquina   Usted aprenderá cómo preparar y usar los utensilios y el equipo  También le ensañarán lo que necesita hacer si tiene problemas jon los intercambios  INSTRUCCIONES SOBRE EL BAO HOSPITALARIA:   Llame a paez médico o especialista en diálisis si:  · Tiene dificultad para respirar o un dolor sordo en el abdomen mientras hace un intercambio de diálisis  · El dializador no fluye correctamente dentro o fuera de paez abdomen jon un intercambio  · El catéter tiene patric grieta o agujero o se salió parte o completamente del abdomen  · Usted tiene estreñimiento, dolor de BJURHOLM y está vomitando  · Patric nueva protuberancia creció en el abdomen desde que comenzó a hacer los intercambios para DPCA  · El [place] de salida del catéter está thom, sensible, adolorida o drena líquidos o pus  · El dializador saliendo del abdomen se ve turbio  · El área de salida está más florinda de lo que estaba  · Presenta un aumento de peso repentino o tiene los brazos o las piernas hinchados  · Usted tiene fiebre o escalofríos  · Usted tiene preguntas o inquietudes acerca de paez condición o cuidado  Medicamentos:  · Los antibióticos podrían administrarse para combatir o prevenir patric infección causada por bacterias  · [place] es medicamentos efren se le haya indicado  Consulte con paez médico si usted pato que paez medicamento no le está ayudando o si presenta efectos secundarios  Infórmele si es alérgico a cualquier medicamento  Mantenga patric lista actualizada de los [place], las vitaminas y los productos herbales que nirav  Incluya los siguientes datos de los medicamentos: cantidad, frecuencia y motivo de administración  Traiga con usted la lista o los envases de las píldoras a es citas de seguimiento  Lleve la lista de los medicamentos con usted en dario de patric emergencia  Tipos de diálisis peritoneal y cómo se realizan:  · La diálisis peritoneal continua ambulatoria (DPCA) significa que usted hace los intercambios a mano   El intercambio dura unos 40 minutos  La solución permanecerá en paez abdomen jon al menos 4 a 6 horas  Es posible que necesite cambiar la solución 4 o más veces al día  Usted puede dormir con la solución en paez abdomen jon toda la noche sin tener que despertarse para un intercambio  · La diálisis peritoneal automatizada (DPA) [place] patric máquina llamada cicladora  Coloca el dializador dentro del abdomen y lo drena después de que el intercambio se Price  Usted podría realizar 1 intercambio a mano que permite que el dializador permanezca en el abdomen jon el día  Por la noche, usted puede conectar el catéter a la cicladora para drenarlo  Los intercambios de diálisis peritoneal también se realizarán mientras usted duerme  Si usted duerme de 8 a 9 horas, la máquina podría realizar de 3 a 5 intercambios jon chantel tiempo  El equipo de diálisis le mostrará efren preparar y usar la cicladora  Cómo prepararse para hacer un intercambio de DPCA: Los intercambios deberían realizarse en patric habitación con buena iluminación  No debería salud animales, caspa, vientos laura ni ventiladores en la habitación  Estos podrían aumentar el riesgo de [place]  · Reúna es instrumentos  ? Bolsa de dializador y bolsa para desecho    ? Base para IV (se [place] para colgar la bolsa del dializador)    ? Tubo en forma de Y y pinzas de tubo    ? Bernadine Draft y Tiffanie Sienna Jasso    ? [place] de plástico nueva sin la aguja (si es necesario)    · [employer] las autumn con agua y [place]  Lávese las autumn al menos por 15 segundos  Séquese josie las autumn con patric toalla limpia o toalla de papel nueva  Paez médico le puede recomendar que use un gel a base de alcohol después que se seque las autumn  El gel servirá para matar cualquier bacteria que quede después de lavarse las autumn  · Póngase la máscara y [employer]  Asegúrese que la máscara le cubra la nariz y la boca   [place] ayudará a [place] [employer] gérmenes de paez nariz o boca lleguen al Katie Griffon de salida  Póngase los guantes  No toque nada que no sea el vendaje y las provisiones [place] tenga los guantes puestos  Cómo realizar un intercambio de DPCA:  · Enjuague el tubo con dializador  Conecte el extremo inferior del tubo en Y a paez catéter  Conecte los otros 2 extremos del tubo a la bolsa de dializador y a la bolsa de desechos  Sujete el tubo que va a paez abdomen  Permita que 759 mililitros (mL) de dializador fresco fluyan fuera de la bolsa  Deje que drene por el tubo dentro de la bolsa de desechos  Luego sujete el tubo que va a la bolsa de desechos  · Permita que el dializador corra dentro del abdomen  Cuelgue la bolsa a un nivel más alto del abdomen  Retire la pinza para permitir que el bhavana del dializador corra dentro del abdomen  [place] no debería tamiko más de 10 minutos  Usted se puede acostar, sentar o estar de pie mientras el dializador ingresa  Brielle vez que todo el dializador esté en el abdomen, lávese las autumn y [place] un par de [employer]  Desconecte el catéter de la tubería  Sujete el catéter con la pinza  Mantenga el dializador en el abdomen de 3 a 5 horas en tiempo de espera  · Drene el dializador fuera de paez abdomen  Lávese las autumn nuevamente  Póngase la máscara y los guantes nuevos  Conecte la tubería en forma de Y al catéter  Sujete el tubo que va a la bolsa del dializador  Cuelgue la bolsa a un nivel más bajo que el abdomen  Remueva la pinza de la tubería que conduce a la bolsa de desechos  Permita que el dializador drene del abdomen dentro de la bolsa de desecho  Debería tamiko menos de 45 minutos para drenar el dializador fuera del abdomen  El [place] [employer] fuera, debería estar vadim  Luego cierre la bolsa de desechos y [address] instrucciones  Lávese las Standard [place]  Lo que usted necesita saber acerca del aumento de peso: El aumento de peso puede deberse al azúcar extra que usted obtendrá del dializador   Usted recibirá instrucciones para patric dieta de diálisis  La dieta lo ayudará a [employer] nutrientes y las calorías que correspondan  El aumento de peso también puede ocurrir si paez cuerpo está reteniendo líquidos  Dam[place] lo siguiente para controlar el aumento de peso debido al exceso de líquido:  · Limite los líquidos efren se lo indique paez Jacey Lisbet un registro escrito de la cantidad de líquidos que usted consume y [place] cada día  [place] se llama ingesta y salida  · Pésese todos los días  Un aumento rápido de peso puede ser un signo de retención de líquidos  Use la misma báscula en el mismo sitio cada vez  Pésese después de ir al baño adrian antes de comer  Lleve un registro de paez peso cada día  · Lleve es registros de ingesta y salida y de peso diario a las visitas de seguimiento  Paez médico le dirá si usted tiene demasiado líquido en paez cuerpo y qué hacer para corregirlo  Acuda a las consultas de control con paez médico o especialista en diálisis según le indicaron: Anote es preguntas para que se acuerde de hacerlas jon es visitas  © Copyright [address] Information is for End User's use only and may not be sold, redistributed or otherwise used for commercial purposes  All illustrations and images included in CareNotes® are the copyrighted property of A D A M , Inc  or [address] [place] [address] Avenue es sólo para uso en educación  Paez intención no es darle un consejo médico sobre enfermedades o tratamientos  Colsulte con paez Tawanna Clipper farmacéutico antes de seguir cualquier régimen médico para saber si es seguro y efectivo para usted  Diálisis peritoneal   LO QUE NECESITA SABER:   ¿Qué es la diálisis peritoneal? La diálisis peritoneal se realiza para remover desechos, químicos y líquido adicional del cuerpo  Se coloca un líquido que se conoce efren dializador dentro del abdomen a través de un catéter (un tubo hannah)  El líquido permanece en paez abdomen jon varias horas a la vez   [place] se llama tiempo de permanencia  El [employer], químicos y líquido adicional de la emmy a través del peritoneo  El peritoneo es un revestimiento hannah en el interior del abdomen  El peritoneo funciona efren un filtro conforme pasan los desechos a través de Juan José  El proceso de llenar y vaciar el abdomen con dializador se conoce efren intercambio  ¿Cuáles son los tipos de diálisis peritoneal y cómo se realizan? · La diálisis peritoneal continua ambulatoria (DPCA) significa que usted hace los intercambios a mano  El intercambio dura unos 40 minutos  La solución permanecerá en paez abdomen jon al menos 4 a 6 horas  Es posible que necesite cambiar la solución 4 o más veces al día  Usted puede dormir con la solución en paez abdomen jon toda la noche sin tener que despertarse para un intercambio  · La diálisis peritoneal automatizada (DPA) [place] patric máquina llamada cicladora  Coloca el dializador dentro del abdomen y lo drena después de que el intercambio se Price  Usted podría realizar 1 intercambio a mano que permite que el dializador permanezca en el abdomen jon el día  Por la noche, usted puede conectar el catéter a la cicladora para drenarlo  Los intercambios de diálisis peritoneal también se realizarán mientras usted duerme  Si usted duerme de 8 a 9 horas, la máquina podría realizar de 3 a 5 intercambios jon chantel tiempo  El equipo de diálisis le mostrará efren preparar y usar la cicladora  ¿Cómo me preparo para hacer un intercambio de DPCA? Los intercambios deberían realizarse en patric habitación con buena iluminación  No debería salud animales, caspa, vientos laura ni ventiladores en la habitación  Estos podrían aumentar el riesgo de [place]  · Reúna es instrumentos  ? Bolsa de dializador y bolsa para desecho    ? Base para IV (se [place] para colgar la bolsa del dializador)    ? Tubo en forma de Y y pinzas de tubo    ? Sanket Arpit Jasso    ?  [place]  plástico nueva sin la aguja (si es necesario)    · [employer] autumn con agua y [place]  Lávese las autumn al menos por 15 segundos  Séquese josie las autumn con patric toalla limpia o toalla de papel nueva  Paez médico le puede recomendar que use un gel a base de alcohol después que se seque las autumn  El gel servirá para matar cualquier bacteria que quede después de lavarse las autumn  · Póngase la máscara y [employer]  Asegúrese que la máscara le cubra la nariz y la boca  [place] ayudará a [place] [employer] gérmenes de paez nariz o boca lleguen al área de salida  Póngase los guantes  No toque nada que no sea el vendaje y las provisiones [address] tenga los guantes puestos  ¿Cómo se realiza un intercambio de DPCA? · Enjuague el tubo con dializador  Conecte el extremo inferior del tubo en Y a paez catéter  Conecte los otros 2 extremos del tubo a la bolsa de dializador y a la bolsa de desechos  Sujete el tubo que va a paez abdomen  Permita que 439 mililitros (mL) de dializador fresco fluyan fuera de la bolsa  Deje que drene por el tubo dentro de la bolsa de desechos  Luego sujete el tubo que va a la bolsa de desechos  · Permita que el dializador corra dentro del abdomen  Cuelgue la bolsa a un nivel más alto del abdomen  Retire la pinza para permitir que el bhavana del dializador corra dentro del abdomen  [place] no debería tamiko más de 10 minutos  Usted se puede acostar, sentar o estar de pie mientras el dializador ingresa  Patric vez que todo el dializador esté en el abdomen, lávese las autumn y [place] un par de [employer]  Desconecte el catéter de la tubería  Sujete el catéter con la pinza  Mantenga el dializador en el abdomen de 3 a 5 horas en tiempo de espera  · Drene el dializador fuera de paez abdomen  Lávese las autumn nuevamente  Póngase la máscara y los guantes nuevos  Conecte la tubería en forma de Y al catéter  Sujete el tubo que va a la bolsa del dializador  Cuelgue la bolsa a un nivel más bajo que el abdomen  Remueva la pinza de la tubería que conduce a la bolsa de desechos  Permita que el dializador drene del abdomen dentro de la bolsa de desecho  Debería tamiko menos de 45 minutos para drenar el dializador fuera del abdomen  El [place] fuera, debería estar vadim  Luego cierre la bolsa de desechos y [address] instrucciones  Lávese las Standard [place]  ¿Qué necesito saber sobre el aumento de [place]? El aumento de peso puede deberse al azúcar extra que usted obtendrá del dializador  Usted recibirá instrucciones para patric dieta de diálisis  La dieta lo ayudará a [employer] nutrientes y las calorías que correspondan  El aumento de peso también puede ocurrir si paez cuerpo está reteniendo líquidos  [place] lo siguiente para controlar el aumento de peso debido al exceso de líquido:  · Limite los líquidos efren se lo indique paez Maria Del Carmen Spikes un registro escrito de la cantidad de líquidos que usted consume y [place] cada día  [place] se llama ingesta y salida  · Pésese todos los días  Un aumento rápido de peso puede ser un signo de retención de líquidos  Use la misma báscula en el mismo sitio cada vez  Pésese después de ir al baño adrian antes de comer  Lleve un registro de paez peso cada día  · Lleve es registros de ingesta y salida y de peso diario a las visitas de seguimiento  Paez médico le dirá si usted tiene demasiado líquido en paez cuerpo y qué hacer para corregirlo  ¿Cuándo clay llamar a mi médico?  · Tiene dificultad para respirar o un dolor sordo en el abdomen mientras hace un intercambio de diálisis  · El dializador no fluye correctamente dentro o fuera de paez abdomen jon un intercambio  · El catéter tiene patric grieta o agujero o se salió parte o completamente del abdomen  · Usted tiene estreñimiento, dolor de BJURHOLM y está vomitando  · Patric nueva protuberancia creció en el abdomen desde que comenzó a hacer los intercambios para DPCA      · El [place] de salida del catéter está Pallavi Lack, sensible, adolorida o drena líquidos o pus  · El dializador saliendo del abdomen se ve turbio  · El área de salida está más florinda de lo que estaba  · Presenta un aumento de peso repentino o tiene los brazos o las piernas hinchados  · Usted tiene fiebre o escalofríos  · Usted tiene preguntas o inquietudes acerca de paez condición o cuidado  ACUERDOS SOBRE PAEZ CUIDADO:   Usted tiene el derecho de ayudar a planear paez cuidado  Aprenda todo lo que pueda sobre paez condición y efren darle tratamiento  Discuta es opciones de tratamiento con es médicos para decidir el cuidado que usted desea recibir  Usted siempre tiene el derecho de rechazar el tratamiento  Esta información es sólo para uso en educación  Paez intención no es darle un consejo médico sobre enfermedades o tratamientos  Colsulte con paez Samreen Kluver farmacéutico antes de seguir cualquier régimen médico para saber si es seguro y efectivo para usted  © Copyright [address] Information is for End User's use only and may not be sold, redistributed or otherwise used for commercial purposes   All illustrations and images included in CareNotes® are the copyrighted property of [employer]  or [address] [place] [address]

## 2021-02-17 NOTE — PROGRESS NOTES
Progress Note - Nephrology   [facility] A Core 68 y o  male MRN: [id number]  [facility] Encounter: [id number]    ASSESSMENT AND PLAN:  53-year-old male with a history of diastolic CHF/hypertension/thoracic aortic dissection status post surgical repair 2019/atrial fibrillation we are seeing for ESRD:     1  ESRD:  -initially started on PD but apparently had some abdominal pain?  Not good supportive home  Patient switching to HD today  Dr Jose Francisco Su spoke with the patient's daughter who absolutely and adamantly once the PD catheter removed along with the patient as they do not want to ever return to peritoneal dialysis  -save nondominant left arm for permanent dialysis access  -eventual placement of at [facility] [place]  · Status post [facility] today  · Plan for PD catheter removal by Interventional Radiology  · HD today:     HEMODIALYSIS PROCEDURE NOTE  The patient was seen and examined on hemodialysis  The patient is tolerating the procedure well  Time: 3 hours   Sodium:  130 Blood flow: 300   Dialyzer: F160 Potassium:3 Dialysate flow:  1 5 times BFR   Access:  Right TDC Bicarbonate:  35 Ultrafiltration goal:  1l   Medications on HD:   Venofer 100 mg         2  Volume/blood pressure:  -blood pressure acceptable   - euvolemic, although will gently try to lower dry weight     3  Electrolytes acceptable     4  MBD of ESRD:   -hyperphosphatemia:  Acceptable at 3 8:  Adjust phosphate binders    and renal diet     5  Anemia:  -hemoglobin 8 1 which is stable  -multiple myeloma evaluation negative July 2020 including SPEP/UPEP/light chain ratio   This had been repeated from December 2019  -low iron studies:  Ferritin 34:  Intravenous iron for 10 treatments with HD  -rule out hemolytic anemia:   · LDH mildly elevated at 265  · Hemolysis smear negative  · Haptoglobin normal             Subjective:   Asymptomatic  No chest pain or shortness of breath  No nausea vomiting or diarrhea      Objective:     Vitals: Blood pressure 128/74, pulse 60, temperature 97 9 °F (36 6 °C), temperature source Oral, resp  rate 17, height 5' 7" (1 702 m), weight 77 2 kg (170 lb 3 1 oz), SpO2 98 %  ,Body mass index is 26 66 kg/m²  Weight (last 2 days)     Date/Time   Weight    02/17/21 0600   77 2 (170 2)    02/16/21 0600   77 2 (170 19)    02/15/21 1719   77 5 (170 86)    02/15/21 0600   77 5 (170 86)                Intake/Output Summary (Last 24 hours) at 2/17/2021 1118  Last data filed at 2/17/2021 0935  Gross per 24 hour   Intake 1060 ml   Output 1575 ml   Net -515 ml       HD Permanent Double Catheter (Active)   Reasons to continue HD Cath Treatment Therapy 02/17/21 0935   Goal for Removal N/A- chronic HD catheter 02/17/21 0935   Line Necessity Reviewed Yes, reviewed with provider 02/17/21 0935   Site Assessment Clean;Dry; Intact 02/17/21 0935   Proximal Lumen Status Blood return noted;Capped;Flushed 02/17/21 0935   Distal Lumen Status Blood return noted;Capped;Flushed 02/17/21 0935   Dressing Type Chlorhexidine dressing 02/17/21 0935   Dressing Status Clean;Dry; Intact 02/17/21 0935   Dressing Change Due 02/22/21 02/16/21 1335       Physical Exam: General:  No acute distress  Skin:  No acute rash  Eyes:  No scleral icterus and noninjected  ENT:  Moist mucous membranes  Neck:  Supple, no jugular venous distention, trachea midline, overall appearance is normal  Chest:  Clear to auscultation; right TDC in place clean and dry  CVS:  Regular rate and rhythm, without a rub or gallops  Abdomen:  Normal bowel sounds, soft and nontender; slightly distended possible fluid wave with the PD catheter still in place nontender  Extremities:  No edema, and no cyanosis, no significant arthritic changes  Neuro:  No gross focality  Psych:  Alert and oriented and appropriate                Medications:    Scheduled Meds:  Current Facility-Administered Medications   Medication Dose Route Frequency Provider Last Rate    acetaminophen  650 mg Oral Q6H CYN Hailee Klein EL Ryder      atorvastatin  40 mg Oral Daily EL Roth      b complex-vitamin C-folic acid  1 capsule Oral Daily With Henri Quijano MD      benzonatate  100 mg Oral TID PRN Brittany Sears PA-C      bumetanide  4 mg Oral BID Lonzell Closs,       guaiFENesin  600 mg Oral Q12H [facility], TR[facility]      HYDROmorphone  0 2 mg Intravenous Q6H PRN [facility], TRIN[facility]      iron sucrose  100 mg Intravenous Daily Antoni Silva MD      melatonin  6 mg Oral HS EL Barrow      ondansetron  4 mg Intravenous Q6H PRN EL Barrow      oxyCODONE  10 mg Oral Q4H PRN Ketan Sawyer MD      oxyCODONE  5 mg Oral Q4H PRN Ketan Sawyer MD      polyethylene glycol  17 g Oral Daily Liana Thomas,       senna-docusate sodium  1 tablet Oral BID Liana Thomas, DO      sodium chloride (PF)  3 mL Intravenous Q1H PRN EL Roth      traZODone  25 mg Oral HS EL Roth         PRN Meds:   acetaminophen    benzonatate    HYDROmorphone    ondansetron    oxyCODONE    oxyCODONE    Insert peripheral IV **AND** sodium chloride (PF)    Continuous Infusions:     Lab, Imaging and other studies: I have personally reviewed pertinent labs    Laboratory Results:  Results from last 7 days   Lab Units 02/16/21  0458 02/14/21  0441 02/13/21  0455 02/11/21  0514   WBC Thousand/uL 5 66 4 68  --  8 74   HEMOGLOBIN g/dL 8 1* 8 0*  --  8 6*   HEMATOCRIT % 26 3* 26 0*  --  27 8*   PLATELETS Thousands/uL 105* 100*  --  100*   POTASSIUM mmol/L 4 3  --  3 9 4 2   CHLORIDE mmol/L 107  --  108 103   CO2 mmol/L 28  --  31 33*   BUN mg/dL 102*  --  98* 97*   CREATININE mg/dL 3 45*  --  3 39* 3 76*   CALCIUM mg/dL 8 4  --  8 5 8 6   PHOSPHORUS mg/dL 3 8  --   --   --      Urinalysis:   Lab Results   Component Value Date    COLORU Yellow 10/04/2020    COLORU Yellow 09/15/2016    CLARITYU Clear 10/04/2020    CLARITYU Transparent 09/15/2016    SPECGRAV 1 015 10/04/2020 SPECGRAV 1 020 09/15/2016    PHUR 5 5 10/04/2020    PHUR 6 0 09/15/2016    LEUKOCYTESUR Negative 10/04/2020    LEUKOCYTESUR negative 09/15/2016    NITRITE Negative 10/04/2020    NITRITE negative 09/15/2016    PROTEINUA 3+ 09/15/2016    GLUCOSEU Negative 10/04/2020    KETONESU Negative 10/04/2020    KETONESU negative 09/15/2016    BILIRUBINUR Negative 10/04/2020    BILIRUBINUR negative 09/15/2016    BLOODU Negative 10/04/2020    BLOODU negative 09/15/2016     ABGs:   Lab Results   Component Value Date    PH 7 296 (L) 12/15/2019     Radiology review:     Portions of the record may have been created with voice recognition software  Occasional wrong word or "sound a like" substitutions may have occurred due to the inherent limitations of voice recognition software  Read the chart carefully and recognize, using context, where substitutions have occurred

## 2021-02-17 NOTE — PLAN OF CARE
Problem: Potential for Falls  Goal: Patient will remain free of falls  Description: INTERVENTIONS:  - Assess patient frequently for physical needs  -  Identify cognitive and physical deficits and behaviors that affect risk of falls    -  [place] fall precautions as indicated by assessment   - Educate patient/family on patient safety including physical limitations  - Instruct patient to call for assistance with activity based on assessment  - Modify environment to reduce risk of injury  - Consider OT/PT consult to assist with strengthening/mobility  Outcome: Progressing     Problem: CARDIOVASCULAR - ADULT  Goal: Maintains optimal cardiac output and hemodynamic stability  Description: INTERVENTIONS:  - Monitor I/O, vital signs and rhythm  - Monitor for S/S and trends of decreased cardiac output  - Administer and titrate ordered vasoactive medications to optimize hemodynamic stability  - Assess quality of pulses, skin color and temperature  - Assess for signs of decreased coronary artery perfusion  - Instruct patient to report change in severity of symptoms  Outcome: Progressing     Problem: METABOLIC, FLUID AND ELECTROLYTES - ADULT  Goal: Electrolytes maintained within normal limits  Description: INTERVENTIONS:  - Monitor labs and assess patient for signs and symptoms of electrolyte imbalances  - Administer electrolyte replacement as ordered  - Monitor response to electrolyte replacements, including repeat lab results as appropriate  - Instruct patient on fluid and nutrition as appropriate  Outcome: Progressing  Goal: Fluid balance maintained  Description: INTERVENTIONS:  - Monitor labs   - Monitor I/O and WT  - Instruct patient on fluid and nutrition as appropriate  - Assess for signs & symptoms of volume excess or deficit  Outcome: Progressing     Problem: MUSCULOSKELETAL - ADULT  Goal: Maintain or return mobility to safest level of function  Description: INTERVENTIONS:  - Assess patient's ability to carry out ADLs; assess patient's baseline for ADL function and identify physical deficits which impact ability to perform ADLs (bathing, care of mouth/teeth, toileting, grooming, dressing, etc )  - Assess/evaluate cause of self-care deficits   - Assess range of motion  - Assess patient's mobility  - Assess patient's need for assistive devices and provide as appropriate  - Encourage maximum independence but intervene and supervise when necessary  - Involve family in performance of ADLs  - Assess for home care needs following discharge   - Consider OT consult to assist with ADL evaluation and planning for discharge  - Provide patient education as appropriate  Outcome: Progressing     Problem: Nutrition/Hydration-ADULT  Goal: Nutrient/Hydration intake appropriate for improving, restoring or maintaining nutritional needs  Description: Monitor and assess patient's nutrition/hydration status for malnutrition  Collaborate with interdisciplinary team and initiate plan and interventions as ordered  Monitor patient's weight and dietary intake as ordered or per policy  Utilize nutrition screening tool and intervene as necessary  Determine patient's food preferences and provide high-protein, high-caloric foods as appropriate       INTERVENTIONS:  - Monitor oral intake, urinary output, labs, and treatment plans  - Assess nutrition and hydration status and recommend course of action  - Evaluate amount of meals eaten  - Assist patient with eating if necessary   - Allow adequate time for meals  - Recommend/ encourage appropriate diets, oral nutritional supplements, and vitamin/mineral supplements  - Order, calculate, and assess calorie counts as needed  - Recommend, monitor, and adjust tube feedings and TPN/PPN based on assessed needs  - Assess need for intravenous fluids  - Provide specific nutrition/hydration education as appropriate  - Include patient/family/caregiver in decisions related to nutrition  Outcome: Progressing     Problem: PAIN - ADULT  Goal: Verbalizes/displays adequate comfort level or baseline comfort level  Description: Interventions:  - Encourage patient to monitor pain and request assistance  - Assess pain using appropriate pain scale  - Administer analgesics based on type and severity of pain and evaluate response  - Implement non-pharmacological measures as appropriate and evaluate response  - Consider cultural and social influences on pain and pain management  - Notify physician/advanced practitioner if interventions unsuccessful or patient reports new pain  Outcome: Progressing     Problem: SAFETY ADULT  Goal: Patient will remain free of falls  Description: INTERVENTIONS:  - Assess patient frequently for physical needs  -  Identify cognitive and physical deficits and behaviors that affect risk of falls    -  [place] fall precautions as indicated by assessment   - Educate patient/family on patient safety including physical limitations  - Instruct patient to call for assistance with activity based on assessment  - Modify environment to reduce risk of injury  - Consider OT/PT consult to assist with strengthening/mobility  Outcome: Progressing  Goal: Maintain or return to baseline ADL function  Description: INTERVENTIONS:  -  Assess patient's ability to carry out ADLs; assess patient's baseline for ADL function and identify physical deficits which impact ability to perform ADLs (bathing, care of mouth/teeth, toileting, grooming, dressing, etc )  - Assess/evaluate cause of self-care deficits   - Assess range of motion  - Assess patient's mobility; develop plan if impaired  - Assess patient's need for assistive devices and provide as appropriate  - Encourage maximum independence but intervene and supervise when necessary  - Involve family in performance of ADLs  - Assess for home care needs following discharge   - Consider OT consult to assist with ADL evaluation and planning for discharge  - Provide patient education as appropriate  Outcome: Progressing  Goal: Maintain or return mobility status to optimal level  Description: INTERVENTIONS:  - Assess patient's baseline mobility status (ambulation, transfers, stairs, etc )    - Identify cognitive and physical deficits and behaviors that affect mobility  - Identify mobility aids required to assist with transfers and/or ambulation (gait belt, sit-to-stand, lift, walker, cane, etc )  - [place] fall precautions as indicated by assessment  - Record patient progress and toleration of activity level on Mobility SBAR; progress patient to next Phase/Stage  - Instruct patient to call for assistance with activity based on assessment  - Consider rehabilitation consult to assist with strengthening/weightbearing, etc   Outcome: Progressing     Problem: DISCHARGE PLANNING  Goal: Discharge to home or other facility with appropriate resources  Description: INTERVENTIONS:  - Identify barriers to discharge w/patient and caregiver  - Arrange for needed discharge resources and transportation as appropriate  - Identify discharge learning needs (meds, wound care, etc )  - Arrange for interpretive services to assist at discharge as needed  - Refer to Case Management Department for coordinating discharge planning if the patient needs post-hospital services based on physician/advanced practitioner order or complex needs related to functional status, cognitive ability, or social support system  Outcome: Progressing     Problem: Knowledge Deficit  Goal: Patient/family/caregiver demonstrates understanding of disease process, treatment plan, medications, and discharge instructions  Description: Complete learning assessment and assess knowledge base    Interventions:  - Provide teaching at level of understanding  - Provide teaching via preferred learning methods  Outcome: Progressing

## 2021-02-17 NOTE — BRIEF OP NOTE (RAD/CATH)
INTERVENTIONAL RADIOLOGY PROCEDURE NOTE    Date: 2/17/2021    Procedure: IR PERITONEAL DIALYSIS CATHETER REMOVAL     Preoperative diagnosis:   1  [facility]    2  Dyspnea    3  AFSHIN (acute kidney injury) ([facility] )    4  Lower extremity edema    5  Acute diastolic CHF (congestive heart failure) ([facility] )    6  Acute on chronic diastolic (congestive) heart failure ([facility] )    7  Chronic kidney disease-mineral and bone disorder         Postoperative diagnosis: Same  Surgeon: Kvng Bose MD     Assistant: None  No qualified resident was available  Blood loss: minimal    Specimens: none    Findings: Successful Peritoneal Dialysis catheter removal     Complications: None immediate      Anesthesia: MAC sedation

## 2021-02-17 NOTE — DISCHARGE SUMMARY
Discharge- Oliver SEBASTIAN [facility] 1940, 68 y o  male MRN: [id number]    [facility]: [id number]    Primary Care Provider: EL Faust   Date and time admitted to hospital: 1/26/2021  1:24 PM        * Acute on chronic diastolic (congestive) heart failure (HCC)  Assessment & Plan  Continue bumex  Metoprolol on hold due to bradycardia  Cardiology input noted  Now on dialysis  Outpatient Cardiology follow-up    ESRD (end stage renal disease) [facility])  Assessment & Plan  Lab Results   Component Value Date    EGFR 16 02/16/2021    EGFR 16 02/13/2021    EGFR 14 02/11/2021    CREATININE 3 45 (H) 02/16/2021    CREATININE 3 39 (H) 02/13/2021    CREATININE 3 76 (H) 02/11/2021     · Baseline creatinine 2-2 9 approximately  4 19 on presentation, now ESRD  · Holding norvasc 2/2 soft BP  · Known to Dr Devora Nixon as outpatient-was on HD in past  · Patient will be switched to hemodialysis   · HD catheter in place  · Dialysis per Nephrology    Discussed with nephrology  Peritoneal dialysis catheter removed    Atrial fibrillation ([address] )  Assessment & Plan  · Continue Coumadin for anticoagulation  · Low present on hold due to bradycardia    Anemia  Assessment & Plan  · Multifactorial in setting of CKD  · IV Venofer x 10 doses per Nephrology  · Monitor    Hyperlipidemia  Assessment & Plan  · Continue statin    Dissection of thoracic aorta [facility])  Assessment & Plan  · 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  · 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  Risk associated with surgical options therefore recommended continued observation  Bradycardia  Assessment & Plan  Also with 2 second pause noted earlier in hospital stay  · Lopressor held  · Asymptomatic  · Appreciate cardiology recommendations, possible Zio patch as outpatient     Benign essential hypertension  Assessment & Plan  · Chronic; history of HTN    · Blood pressures have been labile  · Norvasc and Lopressor on hold  · Monitor blood pressures  · Avoid hypotension          Discharge Summary - [facility] Internal [facility]    Patient Information: Sudha Sharpe 68 y o  male MRN: [id number]  Unit[facility]Bed#: [facility]06[facility] Encounter: [id number]    Discharging Physician / Practitioner: Caroline Jiang MD  PCP: EL Couch  Admission Date: 1/26/2021  Discharge Date: 02/17/21    Disposition:     Home    Reason for Admission:  Dyspnea on exertion, lower extremity swelling    Discharge Diagnoses:     Principal Problem:    Acute on chronic diastolic (congestive) heart failure (HCC)  Active Problems:    Benign essential hypertension    Bradycardia    Dissection of thoracic aorta (HCC)    Hyperlipidemia    Anemia    Atrial fibrillation ([facility] )    Insomnia    [facility]  Resolved Problems:    * No resolved hospital problems  *      Consultations During Hospital Stay:  · Nephrology  · Cardiology    Procedures Performed:     · Chest x-ray minimal left basilar subsegmental atelectasis    Hospital Course:     Corrinne Chum is a 68 y o  male patient who originally presented to the hospital on 1/26/2021 due to worsening shortness of breath, lower extremity edema  Patient was evaluated by Nephrology and Cardiology  He received diuretics  During his stay he was noted to have bradycardia is beta-blocker is discontinued  He will continue with Coumadin for anticoagulation  He will continue Bumex  He was also noted to be hypotensive Norvasc discontinued  Patient with history of end-stage renal disease he was started on hemodialysis and closely followed by Nephrology  He will continue with outpatient hemodialysis  He has a peritoneal dialysis catheter which was removed at the request of the patient and his his daughter  He is hemodynamically stable symptomatically improved and is deemed ready for discharge today    Kindly review the chart for details  Condition at Discharge: fair     Discharge Day Visit / Exam:     Subjective:      Comfortably in bed  Reports feeling better  Agreeable discharge plan      Vitals: Blood Pressure: 101/54 (02/17/21 1635)  Pulse: 70 (02/17/21 1635)  Temperature: 97 7 °F (36 5 °C) (02/17/21 1635)  Temp Source: Oral (02/17/21 1635)  Respirations: 16 (02/17/21 1635)  Height: 5' 7" (170 2 cm) (02/17/21 1200)  Weight - Scale: 74 8 kg (165 lb)(2/17 Post dialysis) (02/17/21 1400)  SpO2: 96 % (02/17/21 1635)  Exam:   Physical Exam    Comfortably in bed  Neck supple  Lungs diminished breath sounds bilaterally  No additional sounds  Heart sounds S1-S2 noted  Abdomen soft nontender  Awake obeys simple commands  Pulses noted  No rash    Discharge instructions/Information to patient and family:   See after visit summary for information provided to patient and family  Discharge plan discussed nephrology  Discharge plan discussed the patient, called and updated daughter Vangie Jeffrey  Outpatient follow-up with Nephrology, Cardiology, primary care physician    Provisions for Follow-Up Care:  See after visit summary for information related to follow-up care and any pertinent home health orders  Planned Readmission: no     Discharge Statement:  I spent 45 minutes discharging the patient  This time was spent on the day of discharge  I had direct contact with the patient on the day of discharge  Greater than 50% of the total time was spent examining patient, answering all patient questions, arranging and discussing plan of care with patient as well as directly providing post-discharge instructions  Additional time then spent on discharge activities  Discharge Medications:  See after visit summary for reconciled discharge medications provided to patient and family        ** Please Note: This note has been constructed using a voice recognition system **

## 2021-02-18 ENCOUNTER — TRANSITIONAL CARE MANAGEMENT (OUTPATIENT)
Dept: FAMILY MEDICINE CLINIC | Facility: CLINIC | Age: 81
End: 2021-02-18

## 2021-02-24 ENCOUNTER — TELEPHONE (OUTPATIENT)
Dept: CARDIOLOGY CLINIC | Facility: CLINIC | Age: 81
End: 2021-02-24

## 2021-02-24 ENCOUNTER — ANTICOAG VISIT (OUTPATIENT)
Dept: CARDIOLOGY CLINIC | Facility: CLINIC | Age: 81
End: 2021-02-24

## 2021-02-24 ENCOUNTER — LAB (OUTPATIENT)
Dept: LAB | Facility: HOSPITAL | Age: 81
End: 2021-02-24
Payer: COMMERCIAL

## 2021-02-24 DIAGNOSIS — Z51.81 ANTICOAGULATION GOAL OF INR 2 TO 3: ICD-10-CM

## 2021-02-24 DIAGNOSIS — Z79.01 ANTICOAGULATION GOAL OF INR 2 TO 3: ICD-10-CM

## 2021-02-24 DIAGNOSIS — I50.33 ACUTE ON CHRONIC DIASTOLIC (CONGESTIVE) HEART FAILURE (HCC): ICD-10-CM

## 2021-02-24 LAB
ANION GAP SERPL CALCULATED.3IONS-SCNC: 0 MMOL/L (ref 4–13)
BUN SERPL-MCNC: 30 MG/DL (ref 5–25)
CALCIUM SERPL-MCNC: 9.1 MG/DL (ref 8.3–10.1)
CHLORIDE SERPL-SCNC: 110 MMOL/L (ref 100–108)
CO2 SERPL-SCNC: 34 MMOL/L (ref 21–32)
CREAT SERPL-MCNC: 3.35 MG/DL (ref 0.6–1.3)
GFR SERPL CREATININE-BSD FRML MDRD: 17 ML/MIN/1.73SQ M
GLUCOSE P FAST SERPL-MCNC: 82 MG/DL (ref 65–99)
POTASSIUM SERPL-SCNC: 4.9 MMOL/L (ref 3.5–5.3)
SODIUM SERPL-SCNC: 144 MMOL/L (ref 136–145)

## 2021-02-24 PROCEDURE — 80048 BASIC METABOLIC PNL TOTAL CA: CPT

## 2021-02-24 NOTE — TELEPHONE ENCOUNTER
Family called, pt has F/U appt tomorrow  Advised to continue Bumex as prescribed  Verbally understood

## 2021-02-25 ENCOUNTER — OFFICE VISIT (OUTPATIENT)
Dept: FAMILY MEDICINE CLINIC | Facility: CLINIC | Age: 81
End: 2021-02-25
Payer: COMMERCIAL

## 2021-02-25 ENCOUNTER — TELEPHONE (OUTPATIENT)
Dept: ADMINISTRATIVE | Facility: HOSPITAL | Age: 81
End: 2021-02-25

## 2021-02-25 VITALS
RESPIRATION RATE: 20 BRPM | DIASTOLIC BLOOD PRESSURE: 50 MMHG | WEIGHT: 161.2 LBS | HEART RATE: 76 BPM | SYSTOLIC BLOOD PRESSURE: 110 MMHG | HEIGHT: 67 IN | OXYGEN SATURATION: 97 % | TEMPERATURE: 98.2 F | BODY MASS INDEX: 25.3 KG/M2

## 2021-02-25 DIAGNOSIS — N18.9 CHRONIC KIDNEY DISEASE-MINERAL AND BONE DISORDER: ICD-10-CM

## 2021-02-25 DIAGNOSIS — N18.6 ESRD (END STAGE RENAL DISEASE) (HCC): Primary | ICD-10-CM

## 2021-02-25 DIAGNOSIS — Z09 HOSPITAL DISCHARGE FOLLOW-UP: Primary | ICD-10-CM

## 2021-02-25 DIAGNOSIS — E83.9 CHRONIC KIDNEY DISEASE-MINERAL AND BONE DISORDER: ICD-10-CM

## 2021-02-25 DIAGNOSIS — I50.33 ACUTE ON CHRONIC DIASTOLIC (CONGESTIVE) HEART FAILURE (HCC): ICD-10-CM

## 2021-02-25 DIAGNOSIS — N18.4 ANEMIA DUE TO STAGE 4 CHRONIC KIDNEY DISEASE (HCC): ICD-10-CM

## 2021-02-25 DIAGNOSIS — H93.11 TINNITUS OF RIGHT EAR: ICD-10-CM

## 2021-02-25 DIAGNOSIS — D63.1 ANEMIA DUE TO STAGE 4 CHRONIC KIDNEY DISEASE (HCC): ICD-10-CM

## 2021-02-25 DIAGNOSIS — I10 BENIGN ESSENTIAL HYPERTENSION: Chronic | ICD-10-CM

## 2021-02-25 DIAGNOSIS — I48.91 ATRIAL FIBRILLATION, UNSPECIFIED TYPE (HCC): ICD-10-CM

## 2021-02-25 DIAGNOSIS — N18.6 ESRD (END STAGE RENAL DISEASE) (HCC): ICD-10-CM

## 2021-02-25 DIAGNOSIS — M89.9 CHRONIC KIDNEY DISEASE-MINERAL AND BONE DISORDER: ICD-10-CM

## 2021-02-25 PROCEDURE — 99495 TRANSJ CARE MGMT MOD F2F 14D: CPT | Performed by: NURSE PRACTITIONER

## 2021-02-25 PROCEDURE — 1111F DSCHRG MED/CURRENT MED MERGE: CPT | Performed by: NURSE PRACTITIONER

## 2021-02-25 RX ORDER — DOCUSATE SODIUM 100 MG/1
100 CAPSULE, LIQUID FILLED ORAL 2 TIMES DAILY
Qty: 30 CAPSULE | Refills: 3 | Status: SHIPPED | OUTPATIENT
Start: 2021-02-25 | End: 2021-05-03

## 2021-02-25 NOTE — PATIENT INSTRUCTIONS
Chronic Kidney Disease Diet   AMBULATORY CARE:   A chronic kidney disease (CKD) diet  limits protein, phosphorus, sodium, and potassium  Liquids may also need to be limited in later stages of CKD  This diet can help slow down the rate of damage to your kidneys  Your diet may change over time as your health condition changes  You may also need to make other diet changes if you have other health problems, such as diabetes  Diet changes: There are 5 stages of CKD  The diet changes you need to make are based on your stage of kidney disease  Work with your dietitian or healthcare provider to plan meals that are right for you  You may need any of the following:  · Limit protein  in all stages of kidney disease  Limit the portion sizes of protein you eat to limit the amount of work your kidneys have to do  Foods that are high in protein are meat, poultry (chicken and turkey), fish, eggs, and dairy (milk, cheese, yogurt)  Your healthcare provider will tell you how much protein to eat each day  · Limit sodium  if you have high blood pressure  Limit your sodium intake to less than 2,300 milligrams (mg) each day  Ask your dietitian or healthcare provider how much sodium you can have each day  The amount of sodium you should have depends on your stage of kidney disease  Table salt, canned foods, soups, salted snacks, and processed meats, like deli meats and sausage, are high in sodium  · Limit the amount of phosphorus  you eat  Your kidneys cannot get rid of extra phosphorus that builds up in your blood  This may cause your bones to lose calcium and weaken  Foods that are high in phosphorus are dairy products, beans, peas, nuts, and whole grains  Phosphorus is also found in cocoa, beer, and cola drinks  Your healthcare provider will tell you how much phosphorus you can have each day  · Limit potassium  if your potassium blood levels are too high   Your dietitian or healthcare provider will tell you if you need to limit potassium  Potassium is found in fruits and vegetables  · Limit liquids  as directed  Your healthcare provider may recommend that you limit liquids in stages 4 and 5 of kidney disease  If your body retains fluids, you will have swelling and fluid may build up in your lungs  This can cause other health problems, such as shortness of breath  Foods you may include: Your dietitian will tell you how many servings you can have from each of the food groups below  The approximate amount of these nutrients is listed next to each food group  Read the food label to find the exact amount  · Bread, cereal, and grains: These foods contain about 80 calories, 2 grams (g) of protein, 150 mg of sodium, 50 mg of potassium, and 30 mg of phosphorus  ? 1 slice (1 ounce) of bread ([place], [place], raisin, light rye, or sourdough white), small dinner roll, or 6-inch tortilla    ? ½ of a hamburger bun, hot dog bun, or English muffin or ¼ of a bagel    ? 1 cup of unsweetened cereal or ½ cup of cooked cereal, such as cream of wheat    ? ? cup of cooked pasta (noodles, macaroni, or spaghetti) or rice    ? 4 (2-inch) unsalted crackers or 3 squares of lawanda crackers    ? 3 cups of air-popped, unsalted popcorn    ? ¾ ounce of unsalted pretzels    · Vegetables:  A serving of these foods contains about 30 calories, 2 g of protein, 50 mg of sodium, and 50 mg of phosphorus  ? Low potassium (less than 150 mg):      ? ½ cup cooked green beans, cabbage, cauliflower, beets, or corn    ? 1 cup raw cucumber, endive, alfalfa sprouts, cabbage, cauliflower, or watercress    ? 1 cup of all types of lettuce    ? ¼ cup cooked or ½ cup raw mushrooms or onions    ? 1 cup cooked eggplant    ? Medium potassium (150 to 250 mg):      ? 1 cup raw broccoli, celery, or zucchini    ? ½ cup cooked asparagus, broccoli, celery, green peas, summer squash, zucchini, or peppers    ?  1 cup cooked kale or turnips    · Fruits:  A serving of these foods contains about 60 calories, 0 g protein, 0 mg sodium, and 150 mg of phosphorus  Each serving is ½ cup, unless another amount is given  ? Low potassium (less than 150 mg): ? Apple juice, applesauce, or 1 small apple    ? Blueberries    ? Cranberries or cranberry juice cocktail    ? Fresh or canned pears (light syrup or packed in water)    ? Grapes or grape juice    ? Canned peaches (light syrup or packed in water)    ? Pineapple or strawberries    ? 1 tangerine     ? Watermelon    ? Medium potassium (150 to 250 mg):      ? Fresh peaches or pears    ? Cherries    ? Cantaloupe, jeanne, or papaya    ? Grapefruit or grapefruit juice    · Meat, poultry, and fish: These foods have about 75 calories, 7 g of protein, an average of 65 mg of sodium, 115 mg of potassium, and 70 mg of phosphorus  Do not use salt to prepare these foods  ? 1 ounce of cooked beef, pork, or poultry    ? 1 ounce of any fresh or frozen fish, lobster, shrimp, crab, clams, tuna, unsalted canned salmon, or unsalted sardines    · Other protein foods: These foods have about 90 calories, 7 g of protein, an average of 100 mg of sodium, 100 mg of potassium, and 120 mg of phosphorus  ? 1 large whole egg or ¼ cup of low-cholesterol egg substitute    ? 1 ounce of cheese    ? ¼ cup of cottage cheese or tofu    ? 1 ounce of unsalted nuts or 2 tablespoons of peanut butter    · Fats: These foods have very little protein and about 45 calories, 55 mg of sodium, 10 mg of potassium, and 5 mg of phosphorus  Include healthy fats, such as unsaturated fats, which are listed below  ? 1 teaspoon margarine or mayonnaise     ? 1 teaspoon oil (safflower, sunflower, corn, soybean, olive, peanut, canola)    ? 1 tablespoon oil-based salad dressing (such as [place]) or 2 tablespoons mayonnaise-based salad dressing (such as ranch)    Foods to limit or avoid:   · Starches: The following foods have higher amounts of sodium, potassium, or phosphorus  ?  Biscuits, muffins, pancakes, and waffles     ? Cake and cornbread from boxed mixes    ? Oatmeal and whole-wheat cereals    ? Salted pretzel sticks or rings and sandwich cookies    · Meat and protein foods: The following are high in sodium and phosphorus  ? Deli-style meat, such as roast beef, ham, and turkey    ? Canned salmon and sardines    ? Processed cheese, such as American cheese and cheese spreads    ? Smoked or cured meat, such as corned beef, reis, ham, hot dogs, and sausage    · Legumes: These foods have about 90 calories, 6 g of protein, less than 10 mg of sodium, 250 mg of potassium, and 100 mg of phosphorus  ? ? cup of black beans, red kidney beans, black-eye peas, garbanzos, and lentils    ? ¼ cup of green or mature soybeans    · Dairy: The following foods have about 8 g of protein, an average of 120 mg of sodium, 350 mg of potassium, and 220 mg of phosphorus  ? 1 cup of milk (fat-free, low-fat, whole, buttermilk, or chocolate milk)    ? 1 cup of low-fat plain or sugar-free yogurt or ice cream    ? ½ cup of pudding or custard    ? Nondairy milk substitutes: These foods have 75 calories, 1 gram of protein, and an average of 40 mg of sodium, 60 mg of potassium, and 60 mg of phosphorus  A serving is ½ cup of almond, rice, or soy milk, or nondairy creamer  · Vegetables: The following vegetables are high in potassium  Each serving has more than 250 mg of potassium  A serving is ½ cup, unless another amount is given  ? Artichoke or ¼ of a medium avocado    ? [place] sprouts, beets, chard, enrique or mustard greens    ? Potatoes, sweet potatoes, pumpkin, and yams    ? ¾ cup of okra    ? Raw tomatoes and low-sodium tomato juice, or tomato sauce    ? Winter squash, cooked asparagus, and cooked spinach    · Fruit:  The following fruits are high in potassium  Each serving has more than 250 mg of potassium  ? 3 fresh apricots    ? 1 small nectarine (2 inches across)    ?  1 small orange and ½ cup of orange juice    ? ¼ cup of dates     ? ? of a small honeydew melon    ? 1 six-inch banana     ? ½ cup of prune juice or prunes and kiwifruit    · Fats:  Limit unhealthy fats, such as saturated fats, which are listed below  ? Butter, lard, cream cheese, whipped cream, and sour cream    ? Powdered coffee creamer    · Other: The following foods are high in sodium  ? Frozen dinners, soups, and fast foods, such as hamburgers and pizza (see the food label for serving sizes)    ? Table salt and seasoned salts, such as onion or garlic salt    ? Barbecue sauce, ketchup, mustard, soy sauce, steak sauce, and teriyaki sauce    Contact your healthcare provider if:   · You are gaining or losing weight very quickly  · You have shortness of breath  · You have nausea and are vomiting  · You feel very weak and tired  · You are having trouble following the CKD diet  © Copyright [address] Information is for End User's use only and may not be sold, redistributed or otherwise used for commercial purposes  All illustrations and images included in CareNotes® are the copyrighted property of [employer]  or [address] [place] [address]  The above information is an  only  It is not intended as medical advice for individual conditions or treatments  Talk to your doctor, nurse or pharmacist before following any medical regimen to see if it is safe and effective for you

## 2021-02-25 NOTE — TELEPHONE ENCOUNTER
Spoke with patients granddaughter  Vein mapping scheduled 3/18 at 1pm at [facility] office visit  scheduled same day 3/18 at 3:45 in [place] with Dr Zoe Guo     From: Pauline Hollow <[email or web address]   Sent: Wednesday, February 24, 2021 4:31 PM  To: Levon Hobbs <[email or web address]; Mireille Penta <[email or web address]; [place] Gonzalez <[email or web address]  Cc: Jeffrey Graham <[email or web address] <[email or web address]; Dea Zimmer <[email or web address]; Nina Vargas <[email or web address]; Adama Pulse <[email or web address]; Scot Donovan DeFrancisco <[email or web address]  Subject: [EXTERNAL] new CVC pt [facility] at [facility]! Based upon the critical issue with [employer] targeting Healthcare systems ALL attachments and links from this email should be heavily scrutinized  Hi All,    We have a new CVC pt at [facility], who needs to start vascular access planning  Nephrologist is Dr La Nena Hardin and pts HD schedule is MWF 2nd shift  Please schedule vein mapping and surgical consult  I educated the family today regarding vascular access planning and they are anticipating a call from your office  The pts granddaughter - Doyle Hidalgo coordinates appts and provides transportation for Mr Sharpe    Her contact number is [phone]    Pt details:  Matthew Sharpe  68year old male  1940    [address] [address]  [phone] (I)  [phone] (H)    Thank you,  Noe Dunlap

## 2021-02-25 NOTE — PROGRESS NOTES
FAMILY PRACTICE OFFICE VISIT       NAME: Oliver Sharpe  AGE: 68 y o  SEX: male       : 1940        MRN: [id number]    Assessment and Plan     Problem List Items Addressed This Visit        Cardiovascular and Mediastinum    Benign essential hypertension (Chronic)    Relevant Medications    metoprolol tartrate (LOPRESSOR) 25 mg tablet    Acute on chronic diastolic (congestive) heart failure (HCC)    Relevant Medications    metoprolol tartrate (LOPRESSOR) 25 mg tablet    Atrial fibrillation (HCC)    Relevant Medications    metoprolol tartrate (LOPRESSOR) 25 mg tablet       Genitourinary    ESRD (end stage renal disease) ([facility] [address] )       Other    Anemia      Other Visit Diagnoses     Hospital discharge follow-up    -  Primary    Tinnitus of right ear        Relevant Orders    Ambulatory Referral to Otolaryngology            Patient Instructions   Chronic Kidney Disease Diet   AMBULATORY CARE:   A chronic kidney disease (CKD) diet  limits protein, phosphorus, sodium, and potassium  Liquids may also need to be limited in later stages of CKD  This diet can help slow down the rate of damage to your kidneys  Your diet may change over time as your health condition changes  You may also need to make other diet changes if you have other health problems, such as diabetes  Diet changes: There are 5 stages of CKD  The diet changes you need to make are based on your stage of kidney disease  Work with your dietitian or healthcare provider to plan meals that are right for you  You may need any of the following:  · Limit protein  in all stages of kidney disease  Limit the portion sizes of protein you eat to limit the amount of work your kidneys have to do  Foods that are high in protein are meat, poultry (chicken and turkey), fish, eggs, and dairy (milk, cheese, yogurt)  Your healthcare provider will tell you how much protein to eat each day  · Limit sodium  if you have high blood pressure   Limit your sodium intake to less than 2,300 milligrams (mg) each day  Ask your dietitian or healthcare provider how much sodium you can have each day  The amount of sodium you should have depends on your stage of kidney disease  Table salt, canned foods, soups, salted snacks, and processed meats, like deli meats and sausage, are high in sodium  · Limit the amount of phosphorus  you eat  Your kidneys cannot get rid of extra phosphorus that builds up in your blood  This may cause your bones to lose calcium and weaken  Foods that are high in phosphorus are dairy products, beans, peas, nuts, and whole grains  Phosphorus is also found in cocoa, beer, and cola drinks  Your healthcare provider will tell you how much phosphorus you can have each day  · Limit potassium  if your potassium blood levels are too high  Your dietitian or healthcare provider will tell you if you need to limit potassium  Potassium is found in fruits and vegetables  · Limit liquids  as directed  Your healthcare provider may recommend that you limit liquids in stages 4 and 5 of kidney disease  If your body retains fluids, you will have swelling and fluid may build up in your lungs  This can cause other health problems, such as shortness of breath  Foods you may include: Your dietitian will tell you how many servings you can have from each of the food groups below  The approximate amount of these nutrients is listed next to each food group  Read the food label to find the exact amount  · Bread, cereal, and grains: These foods contain about 80 calories, 2 grams (g) of protein, 150 mg of sodium, 50 mg of potassium, and 30 mg of phosphorus  ? 1 slice (1 ounce) of bread ([place], [place], raisin, light rye, or sourdough white), small dinner roll, or 6-inch tortilla    ? ½ of a hamburger bun, hot dog bun, or English muffin or ¼ of a bagel    ?  1 cup of unsweetened cereal or ½ cup of cooked cereal, such as cream of wheat    ? ? cup of cooked pasta (noodles, macaroni, or spaghetti) or rice    ? 4 (2-inch) unsalted crackers or 3 squares of [place] crackers    ? 3 cups of air-popped, unsalted popcorn    ? ¾ ounce of unsalted pretzels    · Vegetables:  A serving of these foods contains about 30 calories, 2 g of protein, 50 mg of sodium, and 50 mg of phosphorus  ? Low potassium (less than 150 mg):      ? ½ cup cooked green beans, cabbage, cauliflower, beets, or corn    ? 1 cup raw cucumber, endive, alfalfa sprouts, cabbage, cauliflower, or watercress    ? 1 cup of all types of lettuce    ? ¼ cup cooked or ½ cup raw mushrooms or onions    ? 1 cup cooked eggplant    ? Medium potassium (150 to 250 mg):      ? 1 cup raw broccoli, celery, or zucchini    ? ½ cup cooked asparagus, broccoli, celery, green peas, summer squash, zucchini, or peppers    ? 1 cup cooked kale or turnips    · Fruits:  A serving of these foods contains about 60 calories, 0 g protein, 0 mg sodium, and 150 mg of phosphorus  Each serving is ½ cup, unless another amount is given  ? Low potassium (less than 150 mg): ? Apple juice, applesauce, or 1 small apple    ? Blueberries    ? Cranberries or cranberry juice cocktail    ? Fresh or canned pears (light syrup or packed in water)    ? Grapes or grape juice    ? Canned peaches (light syrup or packed in water)    ? Pineapple or strawberries    ? 1 tangerine     ? Watermelon    ? Medium potassium (150 to 250 mg):      ? Fresh peaches or pears    ? Cherries    ? Cantaloupe, jeanne, or papaya    ? Grapefruit or grapefruit juice    · Meat, poultry, and fish: These foods have about 75 calories, 7 g of protein, an average of 65 mg of sodium, 115 mg of potassium, and 70 mg of phosphorus  Do not use salt to prepare these foods  ? 1 ounce of cooked beef, pork, or poultry    ? 1 ounce of any fresh or frozen fish, lobster, shrimp, crab, clams, tuna, unsalted canned salmon, or unsalted sardines    · Other protein foods:   These foods have about 90 calories, 7 g of protein, an average of 100 mg of sodium, 100 mg of potassium, and 120 mg of phosphorus  ? 1 large whole egg or ¼ cup of low-cholesterol egg substitute    ? 1 ounce of cheese    ? ¼ cup of cottage cheese or tofu    ? 1 ounce of unsalted nuts or 2 tablespoons of peanut butter    · Fats: These foods have very little protein and about 45 calories, 55 mg of sodium, 10 mg of potassium, and 5 mg of phosphorus  Include healthy fats, such as unsaturated fats, which are listed below  ? 1 teaspoon margarine or mayonnaise     ? 1 teaspoon oil (safflower, sunflower, corn, soybean, olive, peanut, canola)    ? 1 tablespoon oil-based salad dressing (such as [place]) or 2 tablespoons mayonnaise-based salad dressing (such as ranch)    Foods to limit or avoid:   · Starches: The following foods have higher amounts of sodium, potassium, or phosphorus  ? Biscuits, muffins, pancakes, and waffles     ? Cake and cornbread from boxed mixes    ? Oatmeal and whole-wheat cereals    ? Salted pretzel sticks or rings and sandwich cookies    · Meat and protein foods: The following are high in sodium and phosphorus  ? Deli-style meat, such as roast beef, ham, and turkey    ? Canned salmon and sardines    ? Processed cheese, such as American cheese and cheese spreads    ? Smoked or cured meat, such as corned beef, reis, ham, hot dogs, and sausage    · Legumes: These foods have about 90 calories, 6 g of protein, less than 10 mg of sodium, 250 mg of potassium, and 100 mg of phosphorus  ? ? cup of black beans, red kidney beans, black-eye peas, garbanzos, and lentils    ? ¼ cup of green or mature soybeans    · Dairy: The following foods have about 8 g of protein, an average of 120 mg of sodium, 350 mg of potassium, and 220 mg of phosphorus  ? 1 cup of milk (fat-free, low-fat, whole, buttermilk, or chocolate milk)    ? 1 cup of low-fat plain or sugar-free yogurt or ice cream    ? ½ cup of pudding or custard    ?  Nondairy milk substitutes: These foods have 75 calories, 1 gram of protein, and an average of 40 mg of sodium, 60 mg of potassium, and 60 mg of phosphorus  A serving is ½ cup of almond, rice, or soy milk, or nondairy creamer  · Vegetables: The following vegetables are high in potassium  Each serving has more than 250 mg of potassium  A serving is ½ cup, unless another amount is given  ? Artichoke or ¼ of a medium avocado    ? [place] sprouts, beets, chard, enrique or mustard greens    ? Potatoes, sweet potatoes, pumpkin, and yams    ? ¾ cup of okra    ? Raw tomatoes and low-sodium tomato juice, or tomato sauce    ? Winter squash, cooked asparagus, and cooked spinach    · Fruit:  The following fruits are high in potassium  Each serving has more than 250 mg of potassium  ? 3 fresh apricots    ? 1 small nectarine (2 inches across)    ? 1 small orange and ½ cup of orange juice    ? ¼ cup of dates     ? ? of a small honeydew melon    ? 1 six-inch banana     ? ½ cup of prune juice or prunes and kiwifruit    · Fats:  Limit unhealthy fats, such as saturated fats, which are listed below  ? Butter, lard, cream cheese, whipped cream, and sour cream    ? Powdered coffee creamer    · Other: The following foods are high in sodium  ? Frozen dinners, soups, and fast foods, such as hamburgers and pizza (see the food label for serving sizes)    ? Table salt and seasoned salts, such as onion or garlic salt    ? Barbecue sauce, ketchup, mustard, soy sauce, steak sauce, and teriyaki sauce    Contact your healthcare provider if:   · You are gaining or losing weight very quickly  · You have shortness of breath  · You have nausea and are vomiting  · You feel very weak and tired  · You are having trouble following the CKD diet  © Copyright [address] Information is for End User's use only and may not be sold, redistributed or otherwise used for commercial purposes   All illustrations and images included in CareNotes® are the copyrighted property of [employer]  or [address]   The above information is an  only  It is not intended as medical advice for individual conditions or treatments  Talk to your doctor, nurse or pharmacist before following any medical regimen to see if it is safe and effective for you  1  Hospital discharge follow-up     2  Acute on chronic diastolic (congestive) heart failure ([address] )     3  Benign essential hypertension     4  ESRD (end stage renal disease) ([facility] [address] )     5  Atrial fibrillation, unspecified type ([facility] [address] )     6  Tinnitus of right ear  Ambulatory Referral to Otolaryngology   7  Anemia due to stage 4 chronic kidney disease [facility])       This 44-year-old male presents today for hospital follow-up  He was admitted to the hospital January 26 through February 17th for increasing shortness of breath and lower extremity edema, in the setting of heart failure and end-stage renal disease  He was restarted on hemodialysis, and now continues hemodialysis on Monday Wednesday and Friday  Acute on chronic diastolic heart failure: Continue on Bumex  Continue cardiology follow-up  Appears euvolemic today  No lower extremity edema, lung sounds clear  Weight in office today is 161 lb, compared to last documented weight on hospital discharge 165  Patient is not aware of what his dry weight is  End-stage renal disease:   Continue hemodialysis  He has a right chest wall catheter in place  He is anticipating shunt placement in his right arm  Blood pressure today 110/50  Metoprolol was discontinued during hospital stay due to bradycardia  Heart rate stable today at 76  Anticoagulated on Coumadin for atrial fibrillation  Anemia multifactorial in the setting of chronic kidney disease  He will be receiving 10 doses of venofer as per Nephrology  Recent iron panel shows iron saturation 24%, TIBC 2-179, iron 65, ferritin 34    Right ear tinnitus, recommend follow up with ENT, name and number provided  Chief Complaint     Chief Complaint   Patient presents with    Transition of Care Management       History of Present Illness     Oliver Sharpe is a 82-year-old male presenting today for hospital follow-up  He was admitted to the hospital January 26th and discharged on February 17th  He is accompanied by his daughter today  His daughter lives in [place]  His niece is his caregiver  They are planning to move patient and his wife to [place] in the future to be with their daughter  He went to the hospital in January 26 for worsening shortness of breath, lower extremity edema  He has complex medical history with heart failure and  End-stage renal disease  He was started on hemodialysis, and currently has dialysis on Mondays, Wednesdays, Fridays  He is taking medications as prescribed  Has no lower extremity edema  States his breathing feels good  He enjoys being active, does not like to sit  Review of Systems   Review of Systems   Constitutional: Negative  HENT: Negative  Respiratory: Negative  Cardiovascular: Negative  Gastrointestinal: Negative  Genitourinary: Negative  Musculoskeletal: Negative  Skin: Negative  Neurological: Negative  Hematological: Negative  Psychiatric/Behavioral: Negative  Active Problem List     Patient Active Problem List   Diagnosis    Rotator cuff tear arthropathy, right    Secondary osteoarthritis of right shoulder    Benign essential hypertension    Abnormal TSH    Overweight (BMI 25 0-29  9)    Lumbar pain    Benign prostatic hyperplasia without lower urinary tract symptoms    Bradycardia    Dissection of thoracic aorta (HCC)    S/P aorta repair    Anticoagulation goal of INR 2 to 3    Hyperphosphatemia    Hypotension    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    Complication of vascular dialysis catheter    Hyperlipidemia    Sleep disorder    Encounter for post surgical wound check    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    Monoclonal gammopathy    Paroxysmal atrial fibrillation (HCC)    GONZALES (dyspnea on exertion)    Acute on chronic diastolic (congestive) heart failure (HCC)    Anemia    Chronic kidney disease-mineral and bone disorder    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    Rheumatoid arthritis ([facility]    Atrial fibrillation (HCC)    Insomnia    ESRD (end stage renal disease) ([facility] )       Past Medical History:  Past Medical History:   Diagnosis Date    Acute renal failure superimposed on stage 4 chronic kidney disease ([facility] ) 10/4/2020    Arthritis     BPH without urinary obstruction     CKD (chronic kidney disease), stage III     baseline 1 6-1 7    GERD (gastroesophageal reflux disease)     Hyperlipidemia     Hypertension     [facility]  )        Past Surgical History:  Past Surgical History:   Procedure Laterality Date    APPENDECTOMY      CARDIAC SURGERY      COLONOSCOPY  2017    FRACTURE SURGERY      IR PERITONEAL DIALYSIS CATHETER PLACEMENT  2/3/2021    IR PERITONEAL DIALYSIS CATHETER REMOVAL  2/17/2021    IR PERITONEAL DIALYSIS CATHETER REMOVAL AND PLACEMENT NEW SITE  2/9/2021    IR TEMPORARY DIALYSIS CATHETER PLACEMENT  12/23/2019    IR THORACENTESIS  12/24/2019    IR THORACENTESIS  12/27/2019    IR TUNNELED DIALYSIS CATHETER CHECK/CHANGE/REPOSITION/ANGIOPLASTY  1/6/2020    IR TUNNELED DIALYSIS CATHETER PLACEMENT  1/2/2020    IR TUNNELED DIALYSIS CATHETER PLACEMENT  2/15/2021    IR TUNNELED DIALYSIS CATHETER REMOVAL  3/4/2020    [place] ASCEND AORTA GRAFT W ROOT REPLACMENT  VALVE CONDUIT/CORON RECONSTRUCT N/A 12/15/2019    Procedure: BENTALL PROCEDURE (ASCENDING AORTIC REPAIR) with 26mm Gelweave Graft;  Surgeon: Lori Ruby DO;  [facility];  Service: Cardiac Surgery    [place] RECONSTR TOTAL SHOULDER IMPLANT Right 12/5/2017    Procedure: ARTHROPLASTY SHOULDER REVERSE with OPEN CYST EXCISION; Surgeon: Coty Lerma MD;  [facility];  Service: Orthopedics    SHOULDER SURGERY      WRIST SURGERY         Family History:  Family History   Problem Relation Age of Onset    No Known Problems Mother     Hypertension Father     Arthritis Family     No Known Problems Daughter        Social History:  Social History     Socioeconomic History    Marital status: /Civil Union     Spouse name: Not on file    Number of children: Not on file    Years of education: Not on file    Highest education level: Not on file   Occupational History    Not on file   Social Needs    Financial resource strain: Not hard at all   [address] insecurity     Worry: Never true     Inability: Never true   [employer] needs     Medical: No     Non-medical: No   Tobacco Use    Smoking status: Former Smoker     Packs/day:  00     Quit date:      Years since quittin 2    Smokeless tobacco: Never Used   Substance and Sexual Activity    Alcohol use: Not Currently     Frequency: Never     Drinks per session: Patient refused     Binge frequency: Never    Drug use: Not Currently    Sexual activity: Not Currently     Partners: Female   Lifestyle    Physical activity     Days per week: Not on file     Minutes per session: Not on file    Stress: Not on file   Relationships    Social connections     Talks on phone: Not on file     Gets together: Not on file     Attends Gnosticist service: Not on file     Active member of club or organization: Not on file     Attends meetings of clubs or organizations: Not on file     Relationship status: Not on file    Intimate partner violence     Fear of current or ex partner: Not on file     Emotionally abused: Not on file     Physically abused: Not on file     Forced sexual activity: Not on file   Other Topics Concern    Not on file   Social History Narrative    Daily caffeine consumption, 1 serving a day    Drinks coffee        I have reviewed the patient's medical history in detail; there are no changes to the history as noted in the electronic medical record  Objective     Vitals:    02/25/21 1033   BP: 110/50   Pulse: 76   Resp: 20   Temp: 98 2 °F (36 8 °C)   TempSrc: Temporal   SpO2: 97%   Weight: 73 1 kg (161 lb 3 2 oz)   Height: 5' 7" (1 702 m)     Wt Readings from Last 3 Encounters:   02/25/21 73 1 kg (161 lb 3 2 oz)   02/17/21 74 8 kg (165 lb)   01/19/21 80 3 kg (177 lb)     Physical Exam  Vitals signs and nursing note reviewed  Constitutional:       General: He is not in acute distress  Appearance: Normal appearance  He is not ill-appearing, toxic-appearing or diaphoretic  Comments: Looks significantly better than he did at last office visit  HENT:      Head: Normocephalic and atraumatic  Right Ear: Tympanic membrane and ear canal normal       Left Ear: Tympanic membrane and ear canal normal    Eyes:      Conjunctiva/sclera: Conjunctivae normal       Pupils: Pupils are equal, round, and reactive to light  Neck:      Musculoskeletal: Normal range of motion and neck supple  Thyroid: No thyromegaly  Vascular: No carotid bruit  Cardiovascular:      Rate and Rhythm: Normal rate and regular rhythm  Heart sounds: No murmur  Pulmonary:      Effort: Pulmonary effort is normal  No respiratory distress  Breath sounds: Normal breath sounds  No wheezing or rales  Chest:      Comments: Right upper chest wall hemodialysis catheter, dressing dry and intact  Abdominal:      General: Abdomen is flat  Palpations: Abdomen is soft  Tenderness: There is no abdominal tenderness  Musculoskeletal:      Right lower leg: No edema  Left lower leg: No edema  Lymphadenopathy:      Cervical: No cervical adenopathy  Skin:     General: Skin is warm and dry  Findings: No rash  Neurological:      Mental Status: He is alert and oriented to person, place, and time     Psychiatric:         Mood and Affect: Mood normal  Behavior: Behavior normal             ALLERGIES:  No Known Allergies    Current Medications     Current Outpatient Medications   Medication Sig Dispense Refill    acetaminophen (TYLENOL) 325 mg tablet Take 1-2 tabs q6h PRN mild fever/pain 90 tablet 0    atorvastatin (LIPITOR) 40 mg tablet TAKE 1 TABLET (40 MG TOTAL) BY MOUTH DAILY 90 tablet 0    b complex-vitamin C-folic acid (NEPHROCAPS) 1 mg capsule Take 1 capsule by mouth daily with dinner 30 capsule 0    bumetanide (BUMEX) 2 mg tablet TAKE 2 TABLETS (4 MG TOTAL) BY MOUTH 2 (TWO) TIMES A DAY 60 tablet 3    diphenhydrAMINE (BENADRYL) 25 mg tablet Take 25 mg by mouth every 6 (six) hours as needed for itching      potassium chloride (K-DUR,KLOR-CON) 20 mEq tablet Take 1 tablet (20 mEq total) by mouth 2 (two) times a day 60 tablet 5    warfarin (COUMADIN) 2 mg tablet Anticoagulation needed for afib 30 tablet 0    docusate sodium (COLACE) 100 mg capsule TAKE 1 CAPSULE (100 MG TOTAL) BY MOUTH 2 (TWO) TIMES A DAY 30 capsule 3     No current facility-administered medications for this visit            Health Maintenance     Health Maintenance   Topic Date Due    COVID-19 Vaccine (1 of 2) 03/23/1956    Fall Risk  01/07/2022    Depression Screening PHQ  01/07/2022    Medicare Annual Wellness Visit (AWV)  01/07/2022    BMI: Followup Plan  01/17/2022    BMI: Adult  02/25/2022    DTaP,Tdap,and Td Vaccines (2 - Td) 05/01/2029    Hepatitis C Screening  Completed    Pneumococcal Vaccine: 65+ Years  Completed    Influenza Vaccine  Completed    HIB Vaccine  Aged Out    Hepatitis B Vaccine  Aged Out    IPV Vaccine  Aged Out    Hepatitis A Vaccine  Aged Out    Meningococcal ACWY Vaccine  Aged Out    HPV Vaccine  Aged Out     Immunization History   Administered Date(s) Administered    INFLUENZA 10/16/2014, 09/02/2015, 08/15/2016, 09/15/2018    Influenza Quadrivalent Preservative Free 3 years and older IM 10/16/2014    Influenza Split High Dose Preservative Free IM 01/20/2015    Influenza, high dose seasonal 0 7 mL 10/28/2020    Pneumococcal Conjugate 13-Valent 04/17/2019    Pneumococcal Polysaccharide PPV23 01/07/2021    Tdap 05/01/2019    Zoster 01/20/2015    Zoster Vaccine Recombinant 09/15/2018, 04/17/2019    influenza, trivalent, adjuvanted 08/28/2019       Mahsa Hutchins, EL

## 2021-03-06 DIAGNOSIS — I71.01 DISSECTION OF THORACIC AORTA (HCC): ICD-10-CM

## 2021-03-06 DIAGNOSIS — N17.9 AKI (ACUTE KIDNEY INJURY) (HCC): ICD-10-CM

## 2021-03-06 DIAGNOSIS — N17.9 ACUTE KIDNEY INJURY SUPERIMPOSED ON CHRONIC KIDNEY DISEASE (HCC): ICD-10-CM

## 2021-03-06 DIAGNOSIS — Z98.890 S/P AORTA REPAIR: ICD-10-CM

## 2021-03-06 DIAGNOSIS — N18.9 ACUTE KIDNEY INJURY SUPERIMPOSED ON CHRONIC KIDNEY DISEASE (HCC): ICD-10-CM

## 2021-03-07 PROBLEM — N18.4 ACUTE RENAL FAILURE SUPERIMPOSED ON STAGE 4 CHRONIC KIDNEY DISEASE (HCC): Chronic | Status: RESOLVED | Noted: 2020-10-04 | Resolved: 2021-03-07

## 2021-03-07 PROBLEM — N17.9 ACUTE RENAL FAILURE SUPERIMPOSED ON STAGE 4 CHRONIC KIDNEY DISEASE (HCC): Chronic | Status: RESOLVED | Noted: 2020-10-04 | Resolved: 2021-03-07

## 2021-03-07 RX ORDER — WARFARIN SODIUM 2.5 MG/1
TABLET ORAL
Qty: 90 TABLET | Refills: 1 | OUTPATIENT
Start: 2021-03-07

## 2021-03-08 NOTE — TELEPHONE ENCOUNTER
Spoke with patient per Providers note  Advised patient to call the cardiologist for the refills         Gave the number to patient so he can request  Refill from them

## 2021-03-15 NOTE — PROGRESS NOTES
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 02/24/2020     Priority: Low    Encounter for post surgical wound check 02/11/2020     Priority: Low    Sleep disorder 01/24/2020     Priority: Low    Hyperlipidemia 01/15/2020     Priority: Low    Complication of vascular dialysis catheter 01/05/2020     Priority: Low    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 12/31/2019     Priority: Low    Hyperphosphatemia 12/30/2019     Priority: Low    Hypotension 12/30/2019     Priority: Low    Anticoagulation goal of INR 2 to 3 12/20/2019     Priority: Low    Dissection of thoracic aorta ([facility] ) 12/15/2019     Priority: Low    S/P aorta repair 12/15/2019     Priority: Low    Bradycardia 04/23/2019     Priority: Low    Benign prostatic hyperplasia without lower urinary tract symptoms 01/09/2019     Priority: Low    Lumbar pain 11/11/2018     Priority: Low    Abnormal TSH 02/23/2018     Priority: Low    Overweight (BMI 25 0-29 9) 02/23/2018     Priority: Low    Rotator cuff tear arthropathy, right 12/05/2017     Priority: Low    Secondary osteoarthritis of right shoulder 12/05/2017     Priority: Low    Benign essential hypertension 10/03/2012     Priority: Low    ESRD (end stage renal disease) ([facility] [address] )     Insomnia 01/29/2021    Atrial fibrillation ([facility] [address] ) 01/26/2021    Rheumatoid arthritis ([facility] [address] ) 01/17/2021    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 ([facility] [address] ) 12/14/2020    Chronic kidney disease-mineral and bone disorder 11/04/2020     Chronic HFpEF - Volume management: Bumex 4mg PO BID  S/P PD catheter placement however unable to tolerate treatments  Now receives HD M/W/F and tolerating sessions without difficulty  Still making small amounts of urine  Compensated on exam      Weight: 165 on discharge, today, 161 lbs                   TTE 04/24/2019: LVEF 60%  LVIDd 4 31 cm  IVSd 1 4 cm  Grade 1 DD  Normal RV size and RVSF  Mild TR  PASP 35 mmHg                TTE 10/06/2020: LVEF 60%  LVIDd 4 69 cm  IVSd 1 41 cm  Grade 2 DD  Normal RV size and RVSF  KUNAL  Mild MR  Severe TR                Paroxysmal atrial [id number] = 4 (age, CHF, HTN)  Anticoagulation on Coumadin  Was persistently bradycardic this past admit into the mid 30's  Trial off Metop for now and monitor       Bradycardia  With rates in the 30-40s seen on telemetry this past admit  Consider further monitoring as outpatient, ie Zio patch or HM to further evaluate   BB d/c for now  Continue to monitor response as outpatient         Hypertension  On low side  Amlodipine 5 mg daily d/c during this past admit          Hyperlipidemia  FLP 1/26/21: TC 88, TD 69, HDL 42, LDL 32  Continue on atorvastatin 40 mg daily       Chronic kidney disease, stage IV, new baseline likely 3 3-5  S/P PD cath placement on 2/3/21 however now transitioning to HD  Right sided permacath placed on 2/15/21 HD M/W/F  Still making small amounts of urine    Follows with Dr Brianne Tinajero as outpatient       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 with hemiarch reconstruction and aortic valve resuspension with a 26 mm Dacron graft on 12/15/2019 by Dr Gomez                 Benign prostatic hyperplasia  History of COVID-19 infection: diagnosed in 07/2020      HPI:  Ronaldo Aldridge a 71-year-old man with PMH as below who presented to [facility] 01/26/2021 after being contacted by cardiology office to review abnormal renal function  Patient had increased frequency of metolazone dosing and had minimal improvement with no reported weight loss or symptomatic improvement  Case was discussed with Dr Michael Carrillo and decision was made to have patient proceed to ED for further evaluation      Patient had a prolonged hospital stay  Required aggressive diuresis  Initially planned for at home PD however had difficulty tolerating in house due to severe abdomian/bladder pain during treatments    Decision made to transition to HD  Today, 3/16/21 patient presents for post hospital follow up  Feeling quite well overall  Started HD and tolerating sessions without difficulty  Still making small amounts of urine and questioning whether he still needs as much diuretic  Denies SOB, orthopnea, PND  No dizziness/LH or syncope  Has been taking 25 mg of Metoprolol despite being told to stop this medication on discharge  Past Medical History:   Diagnosis Date    Acute renal failure superimposed on stage 4 chronic kidney disease ([facility] [address] ) 10/4/2020    Arthritis     BPH without urinary obstruction     CKD (chronic kidney disease), stage III     baseline 1 6-1 7    GERD (gastroesophageal reflux disease)     Hyperlipidemia     Hypertension     MI (myocardial infarction) ([facility] [address] )        12 point ROS negative other than that stated in HPI    No Known Allergies        Current Outpatient Medications:     acetaminophen (TYLENOL) 325 mg tablet, Take 1-2 tabs q6h PRN mild fever/pain, Disp: 90 tablet, Rfl: 0    atorvastatin (LIPITOR) 40 mg tablet, TAKE 1 TABLET (40 MG TOTAL) BY MOUTH DAILY, Disp: 90 tablet, Rfl: 0    b complex-vitamin C-folic acid (NEPHROCAPS) 1 mg capsule, Take 1 capsule by mouth daily with dinner, Disp: 30 capsule, Rfl: 0    bumetanide (BUMEX) 2 mg tablet, TAKE 2 TABLETS (4 MG TOTAL) BY MOUTH 2 (TWO) TIMES A DAY, Disp: 60 tablet, Rfl: 3    diphenhydrAMINE (BENADRYL) 25 mg tablet, Take 25 mg by mouth every 6 (six) hours as needed for itching, Disp: , Rfl:     docusate sodium (COLACE) 100 mg capsule, TAKE 1 CAPSULE (100 MG TOTAL) BY MOUTH 2 (TWO) TIMES A DAY, Disp: 30 capsule, Rfl: 3    metoprolol tartrate (LOPRESSOR) 25 mg tablet, Take 25 mg by mouth daily Once daily, Disp: , Rfl:     potassium chloride (K-DUR,KLOR-CON) 20 mEq tablet, Take 1 tablet (20 mEq total) by mouth 2 (two) times a day, Disp: 60 tablet, Rfl: 5    warfarin (COUMADIN) 2 mg tablet, Anticoagulation needed for afib, Disp: 30 tablet, Rfl: 0    Social History     Socioeconomic History    Marital status: /Civil Union     Spouse name: Not on file    Number of children: Not on file    Years of education: Not on file    Highest education level: Not on file   Occupational History    Not on file   Social Needs    Financial resource strain: Not hard at all   [employer] insecurity     Worry: Never true     Inability: Never true   [employer] needs     Medical: No     Non-medical: No   Tobacco Use    Smoking status: Former Smoker     Packs/day:      Quit date:      Years since quittin 2    Smokeless tobacco: Never Used   Substance and Sexual Activity    Alcohol use: Not Currently     Frequency: Never     Drinks per session: Patient refused     Binge frequency: Never    Drug use: Not Currently    Sexual activity: Not Currently     Partners: Female   Lifestyle    Physical activity     Days per week: Not on file     Minutes per session: Not on file    Stress: Not on file   Relationships    Social connections     Talks on phone: Not on file     Gets together: Not on file     Attends Mormonism service: Not on file     Active member of club or organization: Not on file     Attends meetings of clubs or organizations: Not on file     Relationship status: Not on file    Intimate partner violence     Fear of current or ex partner: Not on file     Emotionally abused: Not on file     Physically abused: Not on file     Forced sexual activity: Not on file   Other Topics Concern    Not on file   Social History Narrative    Daily caffeine consumption, 1 serving a day    Drinks coffee        Family History   Problem Relation Age of Onset    No Known Problems Mother     Hypertension Father     Arthritis Family     No Known Problems Daughter        Physical Exam:    Vitals: /60 (BP Location: Left arm, Patient Position: Sitting, Cuff Size: Standard)   Pulse 60   Ht 5' 7" (1 702 m)   Wt 73 2 kg (161 lb 4 8 oz)   SpO2 98%   BMI 25 26 kg/m²     Wt Readings from Last 3 Encounters:   02/25/21 73 1 kg (161 lb 3 2 oz)   02/17/21 74 8 kg (165 lb)   01/19/21 80 3 kg (177 lb)         GEN: Oliver Sharpe appears well, alert and oriented x 3, pleasant and cooperative   HEENT: pupils equal, round, and reactive to light; extraocular muscles intact  NECK: supple, no carotid bruits   HEART: regular rhythm, normal S1 and S2, no murmurs, clicks, gallops or rubs, JVP is flat    LUNGS: clear to auscultation bilaterally; no wheezes, rales, or rhonchi   ABDOMEN: normal bowel sounds, soft, no tenderness, no distention  EXTREMITIES: peripheral pulses normal; no clubbing, cyanosis, or edema  NEURO: no focal findings   SKIN: normal without suspicious lesions on exposed skin    Labs & Results:    Chemistry        Component Value Date/Time    K 4 9 02/24/2021 0832     (H) 02/24/2021 0832    CO2 34 (H) 02/24/2021 0832    CO2 24 12/15/2019 1619    BUN 30 (H) 02/24/2021 0832    CREATININE 3 35 (H) 02/24/2021 0832        Component Value Date/Time    CALCIUM 9 1 02/24/2021 0832    ALKPHOS 81 02/05/2021 0453    AST 26 02/05/2021 0453    ALT 15 02/05/2021 0453         Lab Results   Component Value Date    WBC 5 66 02/16/2021    HGB 8 1 (L) 02/16/2021    HCT 26 3 (L) 02/16/2021     (H) 02/16/2021     (L) 02/16/2021     Lab Results   Component Value Date    LDLCALC 32 01/26/2021     Lab Results   Component Value Date    NTBNP 4,828 (H) 01/26/2021        EKG personally reviewed by EL Adams  No results found for this visit on 03/16/21  Counseling / Coordination of Care  Total floor / unit time spent today 20 minutes  Greater than 50% of total time was spent with the patient and / or family counseling and / or coordination of care  A description of the counseling / coordination of care: 20  Thank you for the opportunity to participate in the care of this patient      Katerin Landa

## 2021-03-16 ENCOUNTER — OFFICE VISIT (OUTPATIENT)
Dept: CARDIOLOGY CLINIC | Facility: CLINIC | Age: 81
End: 2021-03-16
Payer: COMMERCIAL

## 2021-03-16 ENCOUNTER — TRANSCRIBE ORDERS (OUTPATIENT)
Dept: LAB | Facility: HOSPITAL | Age: 81
End: 2021-03-16

## 2021-03-16 ENCOUNTER — APPOINTMENT (OUTPATIENT)
Dept: LAB | Facility: HOSPITAL | Age: 81
End: 2021-03-16
Attending: INTERNAL MEDICINE
Payer: COMMERCIAL

## 2021-03-16 ENCOUNTER — ANTICOAG VISIT (OUTPATIENT)
Dept: CARDIOLOGY CLINIC | Facility: CLINIC | Age: 81
End: 2021-03-16

## 2021-03-16 VITALS
HEIGHT: 67 IN | BODY MASS INDEX: 25.31 KG/M2 | HEART RATE: 60 BPM | WEIGHT: 161.3 LBS | SYSTOLIC BLOOD PRESSURE: 102 MMHG | OXYGEN SATURATION: 98 % | DIASTOLIC BLOOD PRESSURE: 60 MMHG

## 2021-03-16 DIAGNOSIS — I48.91 ATRIAL FIBRILLATION, UNSPECIFIED TYPE (HCC): Primary | ICD-10-CM

## 2021-03-16 DIAGNOSIS — I50.30 DIASTOLIC CONGESTIVE HEART FAILURE, UNSPECIFIED HF CHRONICITY (HCC): Primary | ICD-10-CM

## 2021-03-16 DIAGNOSIS — I48.91 ATRIAL FIBRILLATION, UNSPECIFIED TYPE (HCC): ICD-10-CM

## 2021-03-16 LAB
INR PPP: 1.66 (ref 0.84–1.19)
PROTHROMBIN TIME: 19.6 SECONDS (ref 11.6–14.5)

## 2021-03-16 PROCEDURE — 85610 PROTHROMBIN TIME: CPT

## 2021-03-16 PROCEDURE — 36415 COLL VENOUS BLD VENIPUNCTURE: CPT

## 2021-03-16 PROCEDURE — 99214 OFFICE O/P EST MOD 30 MIN: CPT | Performed by: NURSE PRACTITIONER

## 2021-03-16 PROCEDURE — 1111F DSCHRG MED/CURRENT MED MERGE: CPT | Performed by: NURSE PRACTITIONER

## 2021-03-16 NOTE — PATIENT INSTRUCTIONS
Weight yourself daily  If you gain 3 lbs in one day or 5 lbs in one week, please call the office at [phone] and ask for a nurse or the heart failure nurse  Keep your sodium intake to <2 grams, (2000 mg) per day, and fluids <2 Liters (2000 ml) per day  This is around 6-7, 8 oz glasses of fluid per day    Continue current medications, no changes today  Follow up with Dr Juan Hoffman in 6 weeks  Follow up with nephrology and discuss further needs for diuretics

## 2021-03-18 ENCOUNTER — HOSPITAL ENCOUNTER (OUTPATIENT)
Dept: NON INVASIVE DIAGNOSTICS | Facility: HOSPITAL | Age: 81
Discharge: HOME | End: 2021-03-18
Attending: SURGERY
Payer: COMMERCIAL

## 2021-03-18 ENCOUNTER — CONSULT (OUTPATIENT)
Dept: VASCULAR SURGERY | Facility: CLINIC | Age: 81
End: 2021-03-18
Payer: COMMERCIAL

## 2021-03-18 ENCOUNTER — TELEPHONE (OUTPATIENT)
Dept: VASCULAR SURGERY | Facility: CLINIC | Age: 81
End: 2021-03-18

## 2021-03-18 VITALS
WEIGHT: 161 LBS | TEMPERATURE: 97.3 F | SYSTOLIC BLOOD PRESSURE: 112 MMHG | DIASTOLIC BLOOD PRESSURE: 70 MMHG | HEART RATE: 74 BPM | RESPIRATION RATE: 17 BRPM | BODY MASS INDEX: 25.27 KG/M2 | HEIGHT: 67 IN

## 2021-03-18 DIAGNOSIS — N18.6 ESRD (END STAGE RENAL DISEASE) (HCC): ICD-10-CM

## 2021-03-18 PROCEDURE — 93985 DUP-SCAN HEMO COMPL BI STD: CPT

## 2021-03-18 PROCEDURE — 1160F RVW MEDS BY RX/DR IN RCRD: CPT | Performed by: SURGERY

## 2021-03-18 PROCEDURE — 93985 DUP-SCAN HEMO COMPL BI STD: CPT | Performed by: SURGERY

## 2021-03-18 PROCEDURE — 99214 OFFICE O/P EST MOD 30 MIN: CPT | Performed by: SURGERY

## 2021-03-18 PROCEDURE — 1036F TOBACCO NON-USER: CPT | Performed by: SURGERY

## 2021-03-18 RX ORDER — CHLORHEXIDINE GLUCONATE 0.12 MG/ML
15 RINSE ORAL ONCE
Status: CANCELLED | OUTPATIENT
Start: 2021-03-18 | End: 2021-03-18

## 2021-03-18 NOTE — PROGRESS NOTES
Assessment/Plan:    ESRD (end stage renal disease) ([facility] [address] )    Chronic renal failure currently dialyzed via IJ PermCath  We discussed the options for permanent dialysis access  Unfortunately his form veins are marginal in size based on duplex but are visible on examination  We will plan on exploring the cephalic vein in the left arm and if adequate will create a fistula but if inadequate will move to creation of a forearm loop graft  We did discuss the risks and benefits of this procedure  Diagnoses and all orders for this visit:    [facility] stage renal [facility]) [facility]  -     Ambulatory referral to Vascular Surgery          Subjective:      Patient ID: Eddie Peres is a 68 y o  male  Patient presents in office for a EVAL for AVF  Patient had a vein mapping 03/18  Patient is going to HD on M-W-F at [place]  Patient did have an surgery to the right states he has a metal plate in his wrist  Patient is right handed  Patient denies any numbness or tingling  No open wounds or ulcers  Patient is currently taking Atorvastatin and Warfarin  72-year-old currently dialyzed via right IJ PermCath  He is right handed  He denies a history of deep or superficial venous thrombosis  He has had no previous dialysis access  He denies any difficulty with blood draws or IV access  Of note the patient is with his daughter who helps in translation  Vein mapping study shows small cephalic veins throughout bilateral upper extremities  The upper arm basilic veins are adequate in size  The following portions of the patient's history were reviewed and updated as appropriate: allergies, current medications, past family history, past medical history, past social history, past surgical history and problem list     I have reviewed and made appropriate changes to the review of systems input by the medical assistant      Vitals:    03/18/21 1557   BP: 112/70   BP Location: Right arm   Patient Position: Sitting   Cuff Size: Adult   Pulse: 74   Resp: 17   Temp: (!) 97 3 °F (36 3 °C)   TempSrc: Tympanic   Weight: 73 kg (161 lb)   Height: 5' 7" (1 702 m)       Patient Active Problem List   Diagnosis    Rotator cuff tear arthropathy, right    Secondary osteoarthritis of right shoulder    Benign essential hypertension    Abnormal TSH    Overweight (BMI 25 0-29  9)    Lumbar pain    Benign prostatic hyperplasia without lower urinary tract symptoms    Bradycardia    Dissection of thoracic aorta (HCC)    S/P aorta repair    Anticoagulation goal of INR 2 to 3    Hyperphosphatemia    Hypotension    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    Complication of vascular dialysis catheter    Hyperlipidemia    Sleep disorder    Encounter for post surgical wound check    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    Monoclonal gammopathy    Paroxysmal atrial fibrillation (HCC)    GONZALES (dyspnea on exertion)    Acute on chronic diastolic (congestive) heart failure (HCC)    Anemia    Chronic kidney disease-mineral and bone disorder    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 ([facility] [address] )    Rheumatoid arthritis ([facility] [address] )    Atrial fibrillation (HCC)    Insomnia    ESRD (end stage renal disease) ([facility] [address] )       Past Surgical History:   Procedure Laterality Date    APPENDECTOMY      CARDIAC SURGERY      COLONOSCOPY  2017    FRACTURE SURGERY      IR PERITONEAL DIALYSIS CATHETER PLACEMENT  2/3/2021    IR PERITONEAL DIALYSIS CATHETER REMOVAL  2/17/2021    IR PERITONEAL DIALYSIS CATHETER REMOVAL AND PLACEMENT NEW SITE  2/9/2021    IR TEMPORARY DIALYSIS CATHETER PLACEMENT  12/23/2019    IR THORACENTESIS  12/24/2019    IR THORACENTESIS  12/27/2019    IR TUNNELED DIALYSIS CATHETER CHECK/CHANGE/REPOSITION/ANGIOPLASTY  1/6/2020    IR TUNNELED DIALYSIS CATHETER PLACEMENT  1/2/2020    IR TUNNELED DIALYSIS CATHETER PLACEMENT  2/15/2021    IR TUNNELED DIALYSIS CATHETER REMOVAL  3/4/2020    WV ASCEND AORTA GRAFT W ROOT REPLACMENT  VALVE CONDUIT/CORON RECONSTRUCT N/A 12/15/2019    Procedure: BENTALL PROCEDURE (ASCENDING AORTIC REPAIR) with 26mm Gelweave Graft;  Surgeon: Jordin Burgos DO;  Location: [facility];  Service: Cardiac Surgery    OR RECONSTR TOTAL SHOULDER IMPLANT Right 2017    Procedure: ARTHROPLASTY SHOULDER REVERSE with OPEN CYST EXCISION;  Surgeon: Charmaine Dasilva MD;  Location: [facility];  Service: Orthopedics    SHOULDER SURGERY      WRIST SURGERY         Family History   Problem Relation Age of Onset    No Known Problems Mother     Hypertension Father     Arthritis Family     No Known Problems Daughter        Social History     Socioeconomic History    Marital status: /Civil Union     Spouse name: Not on file    Number of children: Not on file    Years of education: Not on file    Highest education level: Not on file   Occupational History    Not on file   Social Needs    Financial resource strain: Not hard at all   [employer] insecurity     Worry: Never true     Inability: Never true   [employer] needs     Medical: No     Non-medical: No   Tobacco Use    Smoking status: Former Smoker     Packs/day: 1 00     Quit date:      Years since quittin 2    Smokeless tobacco: Never Used   Substance and Sexual Activity    Alcohol use: Not Currently     Frequency: Never     Drinks per session: Patient refused     Binge frequency: Never    Drug use: Not Currently    Sexual activity: Not Currently     Partners: Female   Lifestyle    Physical activity     Days per week: Not on file     Minutes per session: Not on file    Stress: Not on file   Relationships    Social connections     Talks on phone: Not on file     Gets together: Not on file     Attends Hoah[employer]ism service: Not on file     Active member of club or organization: Not on file     Attends meetings of clubs or organizations: Not on file     Relationship status: Not on file    Intimate partner violence     Fear of current or ex partner: Not on file     Emotionally abused: Not on file     Physically abused: Not on file     Forced sexual activity: Not on file   Other Topics Concern    Not on file   Social History Narrative    Daily caffeine consumption, 1 serving a day    Drinks coffee        No Known Allergies      Current Outpatient Medications:     atorvastatin (LIPITOR) 40 mg tablet, TAKE 1 TABLET (40 MG TOTAL) BY MOUTH DAILY, Disp: 90 tablet, Rfl: 0    b complex-vitamin C-folic acid (NEPHROCAPS) 1 mg capsule, Take 1 capsule by mouth daily with dinner, Disp: 30 capsule, Rfl: 0    bumetanide (BUMEX) 2 mg tablet, TAKE 2 TABLETS (4 MG TOTAL) BY MOUTH 2 (TWO) TIMES A DAY, Disp: 60 tablet, Rfl: 3    diphenhydrAMINE (BENADRYL) 25 mg tablet, Take 25 mg by mouth every 6 (six) hours as needed for itching, Disp: , Rfl:     docusate sodium (COLACE) 100 mg capsule, TAKE 1 CAPSULE (100 MG TOTAL) BY MOUTH 2 (TWO) TIMES A DAY, Disp: 30 capsule, Rfl: 3    potassium chloride (K-DUR,KLOR-CON) 20 mEq tablet, Take 1 tablet (20 mEq total) by mouth 2 (two) times a day, Disp: 60 tablet, Rfl: 5    warfarin (COUMADIN) 2 mg tablet, Anticoagulation needed for afib, Disp: 30 tablet, Rfl: 0    acetaminophen (TYLENOL) 325 mg tablet, Take 1-2 tabs q6h PRN mild fever/pain (Patient not taking: Reported on 3/16/2021), Disp: 90 tablet, Rfl: 0    Review of Systems   Constitutional: Negative  HENT: Negative  Eyes: Negative  Respiratory: Negative  Cardiovascular: Negative  Gastrointestinal: Negative  Endocrine: Negative  Genitourinary: Negative  Musculoskeletal: Negative  Skin: Negative  Allergic/Immunologic: Negative  Neurological: Negative  Hematological: Bruises/bleeds easily  Psychiatric/Behavioral: Negative            Objective:      /70 (BP Location: Right arm, Patient Position: Sitting, Cuff Size: Adult)   Pulse 74   Temp (!) 97 3 °F (36 3 °C) (Tympanic)   Resp 17   Ht 5' 7" (1 702 m)   Wt 73 kg (161 lb)   BMI 25 22 kg/m²          Physical Exam  Constitutional:       Appearance: He is well-developed  HENT:      Head: Normocephalic and atraumatic  Eyes:      Pupils: Pupils are equal, round, and reactive to light  Neck:      Musculoskeletal: Normal range of motion and neck supple  Vascular: No carotid bruit or JVD  Cardiovascular:      Rate and Rhythm: Normal rate and regular rhythm  Pulses:           Carotid pulses are 2+ on the right side and 2+ on the left side  Radial pulses are 2+ on the right side and 2+ on the left side  Heart sounds: No murmur  Comments:   Bilateral median cubital veins are easily visible and adequate in appearance for outflow  This cephalic vein at the left wrist on compression of the forearm is adequate in appearance but is not well visualized throughout the remainder of the forearm  Pulmonary:      Effort: Pulmonary effort is normal  No respiratory distress  Breath sounds: Normal breath sounds  Musculoskeletal: Normal range of motion  General: No tenderness  Skin:     General: Skin is warm and dry  Neurological:      Mental Status: He is alert and oriented to person, place, and time  Sensory: No sensory deficit           Operative Scheduling Information:    Hospital:  [facility]    Physician:  Melissa Rosa    Surgery:   Exploration of left cephalic vein, possible radiocephalic fistula possible forearm loop graft    Urgency:  Standard    Level:  Level 2: Outpatients to be scheduled for surgery with time dependent medical necessity within 2 weeks    Case Length:  Normal    Post-op Bed:  Outpatient    OR Table:  Standard    Equipment Needs:  None    Medication Instructions:  Coumadin:  Hold for 4 days prior to procedure    Hydration:  No

## 2021-03-18 NOTE — TELEPHONE ENCOUNTER
Please call 8th lev [facility]an to see if dialysis day can be changed to accommodate this case 3/26/21 [facility]/OR  Call OR scheduling for open block for Dr Burgess 3/26/21

## 2021-03-18 NOTE — LETTER
March 18, 2021     Shadi Kim, [facility]  [address] [address]    Patient: Moisés Sharpe   YOB: 1940   Date of Visit: 3/18/2021       Dear Dr Enrico Salas:    Thank you for referring [facility] to me for evaluation  Below are the relevant portions of my assessment and plan of care  ESRD (end stage renal disease) ([facility] [address] )    Chronic renal failure currently dialyzed via IJ PermCath  We discussed the options for permanent dialysis access  Unfortunately his form veins are marginal in size based on duplex but are visible on examination  We will plan on exploring the cephalic vein in the left arm and if adequate will create a fistula but if inadequate will move to creation of a forearm loop graft  We did discuss the risks and benefits of this procedure  If you have questions, please do not hesitate to call me  I look forward to following [facility] along with you           Sincerely,        Michael Ortega MD        CC: Anila Padron MD

## 2021-03-18 NOTE — ASSESSMENT & PLAN NOTE
Chronic renal failure currently dialyzed via IJ PermCath  We discussed the options for permanent dialysis access  Unfortunately his form veins are marginal in size based on duplex but are visible on examination  We will plan on exploring the cephalic vein in the left arm and if adequate will create a fistula but if inadequate will move to creation of a forearm loop graft  We did discuss the risks and benefits of this procedure

## 2021-03-18 NOTE — PATIENT INSTRUCTIONS
ESRD (end stage renal disease) ([facility] [address] )    Chronic renal failure currently dialyzed via IJ PermCath  We discussed the options for permanent dialysis access  Unfortunately his form veins are marginal in size based on duplex but are visible on examination  We will plan on exploring the cephalic vein in the left arm and if adequate will create a fistula but if inadequate will move to creation of a forearm loop graft  We did discuss the risks and benefits of this procedure

## 2021-03-18 NOTE — TELEPHONE ENCOUNTER
Check out staff:   Surinder Party: Under Reason For Call, comments MUST be formatted as:   (Surgeon's Initials) / (Procedure)    PHYSICIAN / HOSPITAL: Janice Macadamia (NPI: [id number]) / [facility] (Tax: [id number] / NPI: [id number])    PROCEDURE: CREATION OF LEFT RADIOCEPHALIC FISTULA POSSIBLE BRACHIOCEPHALIC FOREARM LOOP GRAFT    LEVEL: 2    REP: No    ASSISTANT SURGEON: No    ALLERGIES: Patient has no known allergies  INSTRUCTIONS GIVEN: NO BOWEL PREP    BLOOD THINNERS / MED HOLD: Hold Rx - Coumadin (Warfarin, Jantoven) , 4 days prior to procedure  HYDRATION REQUIRED: Patient does not require hydration  DIALYSIS: Yes  Where: [facility] [address] [place] [address] Days:  M - W - F    *Please ROUTE this encounter to [facility] Surgery Coordinator   AND   Send COMPLETE CONSENT to Vascular Surgery Schedulers e-mail group*    I certify that patient has signed, printed, timed, and dated their surgery consent  I certify that BOTH sides of the completed surgery consent have been scanned into the patient's Epic chart by myself on 3/18/2021  *Please ROUTE this encounter to [facility] Clearance Pool   AND   Send CLEARANCE FORM(S) to Vascular Nursing e-mail group*    Patient does not require any pre operative clearance

## 2021-03-18 NOTE — H&P (VIEW-ONLY)
Assessment/Plan:    ESRD (end stage renal disease) ([facility] )    Chronic renal failure currently dialyzed via IJ PermCath  We discussed the options for permanent dialysis access  Unfortunately his form veins are marginal in size based on duplex but are visible on examination  We will plan on exploring the cephalic vein in the left arm and if adequate will create a fistula but if inadequate will move to creation of a forearm loop graft  We did discuss the risks and benefits of this procedure  Diagnoses and all orders for this visit:    [facility]  -     Ambulatory referral to Vascular Surgery          Subjective:      Patient ID: Prabhu Encinas is a 68 y o  male  Patient presents in office for a EVAL for AVF  Patient had a vein mapping 03/18  Patient is going to HD on M-W-F at [address]  Patient did have an surgery to the right states he has a metal plate in his wrist  Patient is right handed  Patient denies any numbness or tingling  No open wounds or ulcers  Patient is currently taking Atorvastatin and Warfarin  29-year-old currently dialyzed via right IJ PermCath  He is right handed  He denies a history of deep or superficial venous thrombosis  He has had no previous dialysis access  He denies any difficulty with blood draws or IV access  Of note the patient is with his daughter who helps in translation  Vein mapping study shows small cephalic veins throughout bilateral upper extremities  The upper arm basilic veins are adequate in size  The following portions of the patient's history were reviewed and updated as appropriate: allergies, current medications, past family history, past medical history, past social history, past surgical history and problem list     I have reviewed and made appropriate changes to the review of systems input by the medical assistant      Vitals:    03/18/21 1557   BP: 112/70   BP Location: Right arm   Patient Position: Sitting   Cuff Size: Adult   Pulse: 74   Resp: 17   Temp: (!) 97 3 °F (36 3 °C)   TempSrc: Tympanic   Weight: 73 kg (161 lb)   Height: 5' 7" (1 702 m)       Patient Active Problem List   Diagnosis    Rotator cuff tear arthropathy, right    Secondary osteoarthritis of right shoulder    Benign essential hypertension    Abnormal TSH    Overweight (BMI 25 0-29  9)    Lumbar pain    Benign prostatic hyperplasia without lower urinary tract symptoms    Bradycardia    Dissection of thoracic aorta (HCC)    S/P aorta repair    Anticoagulation goal of INR 2 to 3    Hyperphosphatemia    Hypotension    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    Complication of vascular dialysis catheter    Hyperlipidemia    Sleep disorder    Encounter for post surgical wound check    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    Monoclonal gammopathy    Paroxysmal atrial fibrillation (HCC)    GONZALES (dyspnea on exertion)    Acute on chronic diastolic (congestive) heart failure (HCC)    Anemia    Chronic kidney disease-mineral and bone disorder    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 ([facility] )    Rheumatoid arthritis ([facility] )    Atrial fibrillation (HCC)    Insomnia    ESRD (end stage renal disease) ([facility] )       Past Surgical History:   Procedure Laterality Date    APPENDECTOMY      CARDIAC SURGERY      COLONOSCOPY  2017    FRACTURE SURGERY      IR PERITONEAL DIALYSIS CATHETER PLACEMENT  2/3/2021    IR PERITONEAL DIALYSIS CATHETER REMOVAL  2/17/2021    IR PERITONEAL DIALYSIS CATHETER REMOVAL AND PLACEMENT NEW SITE  2/9/2021    IR TEMPORARY DIALYSIS CATHETER PLACEMENT  12/23/2019    IR THORACENTESIS  12/24/2019    IR THORACENTESIS  12/27/2019    IR TUNNELED DIALYSIS CATHETER CHECK/CHANGE/REPOSITION/ANGIOPLASTY  1/6/2020    IR TUNNELED DIALYSIS CATHETER PLACEMENT  1/2/2020    IR TUNNELED DIALYSIS CATHETER PLACEMENT  2/15/2021    IR TUNNELED DIALYSIS CATHETER REMOVAL  3/4/2020    MS ASCEND AORTA GRAFT W ROOT REPLACMENT  VALVE CONDUIT/CORON RECONSTRUCT N/A 12/15/2019    Procedure: BENTALL PROCEDURE (ASCENDING AORTIC REPAIR) with 26mm Gelweave Graft;  Surgeon: Huber Segura DO;  [facility];  Service: Cardiac Surgery    ID RECONSTR TOTAL SHOULDER IMPLANT Right 2017    Procedure: ARTHROPLASTY SHOULDER REVERSE with OPEN CYST EXCISION;  Surgeon: Matthew Phillips MD;  [facility];  Service: Orthopedics    SHOULDER SURGERY      WRIST SURGERY         Family History   Problem Relation Age of Onset    No Known Problems Mother     Hypertension Father     Arthritis Family     No Known Problems Daughter        Social History     Socioeconomic History    Marital status: /Civil Union     Spouse name: Not on file    Number of children: Not on file    Years of education: Not on file    Highest education level: Not on file   Occupational History    Not on file   Social Needs    Financial resource strain: Not hard at all   Kaushik-Juan insecurity     Worry: Never true     Inability: Never true   [employer] needs     Medical: No     Non-medical: No   Tobacco Use    Smoking status: Former Smoker     Packs/day: 1 00     Quit date:      Years since quittin 2    Smokeless tobacco: Never Used   Substance and Sexual Activity    Alcohol use: Not Currently     Frequency: Never     Drinks per session: Patient refused     Binge frequency: Never    Drug use: Not Currently    Sexual activity: Not Currently     Partners: Female   Lifestyle    Physical activity     Days per week: Not on file     Minutes per session: Not on file    Stress: Not on file   Relationships    Social connections     Talks on phone: Not on file     Gets together: Not on file     Attends Christianity service: Not on file     Active member of club or organization: Not on file     Attends meetings of clubs or organizations: Not on file     Relationship status: Not on file    Intimate partner violence     Fear of current or ex partner: Not on file     Emotionally abused: Not on file     Physically abused: Not on file     Forced sexual activity: Not on file   Other Topics Concern    Not on file   Social History Narrative    Daily caffeine consumption, 1 serving a day    Drinks coffee        No Known Allergies      Current Outpatient Medications:     atorvastatin (LIPITOR) 40 mg tablet, TAKE 1 TABLET (40 MG TOTAL) BY MOUTH DAILY, Disp: 90 tablet, Rfl: 0    b complex-vitamin C-folic acid (NEPHROCAPS) 1 mg capsule, Take 1 capsule by mouth daily with dinner, Disp: 30 capsule, Rfl: 0    bumetanide (BUMEX) 2 mg tablet, TAKE 2 TABLETS (4 MG TOTAL) BY MOUTH 2 (TWO) TIMES A DAY, Disp: 60 tablet, Rfl: 3    diphenhydrAMINE (BENADRYL) 25 mg tablet, Take 25 mg by mouth every 6 (six) hours as needed for itching, Disp: , Rfl:     docusate sodium (COLACE) 100 mg capsule, TAKE 1 CAPSULE (100 MG TOTAL) BY MOUTH 2 (TWO) TIMES A DAY, Disp: 30 capsule, Rfl: 3    potassium chloride (K-DUR,KLOR-CON) 20 mEq tablet, Take 1 tablet (20 mEq total) by mouth 2 (two) times a day, Disp: 60 tablet, Rfl: 5    warfarin (COUMADIN) 2 mg tablet, Anticoagulation needed for afib, Disp: 30 tablet, Rfl: 0    acetaminophen (TYLENOL) 325 mg tablet, Take 1-2 tabs q6h PRN mild fever/pain (Patient not taking: Reported on 3/16/2021), Disp: 90 tablet, Rfl: 0    Review of Systems   Constitutional: Negative  HENT: Negative  Eyes: Negative  Respiratory: Negative  Cardiovascular: Negative  Gastrointestinal: Negative  Endocrine: Negative  Genitourinary: Negative  Musculoskeletal: Negative  Skin: Negative  Allergic/Immunologic: Negative  Neurological: Negative  Hematological: Bruises/bleeds easily  Psychiatric/Behavioral: Negative            Objective:      /70 (BP Location: Right arm, Patient Position: Sitting, Cuff Size: Adult)   Pulse 74   Temp (!) 97 3 °F (36 3 °C) (Tympanic)   Resp 17   Ht 5' 7" (1 702 m)   Wt 73 kg (161 lb)   BMI 25 22 kg/m²          Physical Exam  Constitutional:       Appearance: He is well-developed  HENT:      Head: Normocephalic and atraumatic  Eyes:      Pupils: Pupils are equal, round, and reactive to light  Neck:      Musculoskeletal: Normal range of motion and neck supple  Vascular: No carotid bruit or JVD  Cardiovascular:      Rate and Rhythm: Normal rate and regular rhythm  Pulses:           Carotid pulses are 2+ on the right side and 2+ on the left side  Radial pulses are 2+ on the right side and 2+ on the left side  Heart sounds: No murmur  Comments:   Bilateral median cubital veins are easily visible and adequate in appearance for outflow  This cephalic vein at the left wrist on compression of the forearm is adequate in appearance but is not well visualized throughout the remainder of the forearm  Pulmonary:      Effort: Pulmonary effort is normal  No respiratory distress  Breath sounds: Normal breath sounds  Musculoskeletal: Normal range of motion  General: No tenderness  Skin:     General: Skin is warm and dry  Neurological:      Mental Status: He is alert and oriented to person, place, and time  Sensory: No sensory deficit           Operative Scheduling Information:    Hospital[facility]  [facility]    Physician:  [address] [place] [address]    Surgery:   Exploration of left cephalic vein, possible radiocephalic fistula possible forearm loop graft    Urgency:  Standard    Level:  Level 2: Outpatients to be scheduled for surgery with time dependent medical necessity within 2 weeks    Case Length:  Normal    Post-op Bed:  Outpatient    OR Table:  Standard    Equipment Needs:  None    Medication Instructions:  Coumadin:  Hold for 4 days prior to procedure    Hydration:  No

## 2021-03-19 ENCOUNTER — ANESTHESIA EVENT (OUTPATIENT)
Dept: PERIOP | Facility: HOSPITAL | Age: 81
End: 2021-03-19
Payer: COMMERCIAL

## 2021-03-19 ENCOUNTER — PREP FOR PROCEDURE (OUTPATIENT)
Dept: VASCULAR SURGERY | Facility: CLINIC | Age: 81
End: 2021-03-19

## 2021-03-19 NOTE — TELEPHONE ENCOUNTER
Is patient requesting a call when authorization has been obtained? Patient did not request a call  Surgery Date: 3/26/21  Primary Surgeon: Anthony Lake (NPI: [id number])  Assisting Surgeon: Not Applicable (N/A)  Facility: [facility] (Tax: [id number] / NPI: [id number])  Inpatient / Outpatient: Outpatient  Level: 2    Clearance Received: No clearance ordered  Consent Received: Yes, scanned into Epic on 3/18/21  Medication Hold / Last Dose: Hold on Coumadin 4 days prior  Last dose 3/21/21  VQI Spreadsheet: 3/19/21  IR Notified: Not Applicable (N/A)  Rep  Notified: Not Applicable (N/A)  Equipment Needs: Not Applicable (N/A)  Vas Lab Requested: Not Applicable (N/A)  Patient Contacted: 3/19/21    Diagnosis: N18 6  Procedure/ CPT Code(s): (AV) Arteriovenous Fistula // CPT: 51792    For varicose vein related procedures, last LEVDR? Not Applicable (N/A)    Post Operative Date/ Time: 4/15/21 , 10:00am [place] with Anthony Lake (NPI: [id number])     S/w pt's granddaughter and scheduled him for his surgery with Dr Jorge Owens on 3/26/21 in [facility]  She is aware NPO after midnight on 3/25/21  He will go to a [facility]'s lab for his blood work  Ekg done 2/10/21  She is aware his last dose of Coumadin is on Jacob 3/21/21

## 2021-03-20 ENCOUNTER — APPOINTMENT (OUTPATIENT)
Dept: LAB | Facility: HOSPITAL | Age: 81
End: 2021-03-20
Attending: INTERNAL MEDICINE
Payer: COMMERCIAL

## 2021-03-20 DIAGNOSIS — N18.6 ESRD (END STAGE RENAL DISEASE) (HCC): ICD-10-CM

## 2021-03-20 LAB
ANION GAP SERPL CALCULATED.3IONS-SCNC: 1 MMOL/L (ref 4–13)
BUN SERPL-MCNC: 19 MG/DL (ref 5–25)
CALCIUM SERPL-MCNC: 8.8 MG/DL (ref 8.3–10.1)
CHLORIDE SERPL-SCNC: 105 MMOL/L (ref 100–108)
CO2 SERPL-SCNC: 34 MMOL/L (ref 21–32)
CREAT SERPL-MCNC: 2.57 MG/DL (ref 0.6–1.3)
ERYTHROCYTE [DISTWIDTH] IN BLOOD BY AUTOMATED COUNT: 15 % (ref 11.6–15.1)
GFR SERPL CREATININE-BSD FRML MDRD: 23 ML/MIN/1.73SQ M
GLUCOSE P FAST SERPL-MCNC: 93 MG/DL (ref 65–99)
HCT VFR BLD AUTO: 35.3 % (ref 36.5–49.3)
HGB BLD-MCNC: 11.1 G/DL (ref 12–17)
MCH RBC QN AUTO: 31.8 PG (ref 26.8–34.3)
MCHC RBC AUTO-ENTMCNC: 31.4 G/DL (ref 31.4–37.4)
MCV RBC AUTO: 101 FL (ref 82–98)
PLATELET # BLD AUTO: 123 THOUSANDS/UL (ref 149–390)
PMV BLD AUTO: 9.8 FL (ref 8.9–12.7)
POTASSIUM SERPL-SCNC: 3.9 MMOL/L (ref 3.5–5.3)
RBC # BLD AUTO: 3.49 MILLION/UL (ref 3.88–5.62)
SODIUM SERPL-SCNC: 140 MMOL/L (ref 136–145)
WBC # BLD AUTO: 6.19 THOUSAND/UL (ref 4.31–10.16)

## 2021-03-20 PROCEDURE — 80048 BASIC METABOLIC PNL TOTAL CA: CPT

## 2021-03-20 PROCEDURE — 85027 COMPLETE CBC AUTOMATED: CPT

## 2021-03-22 ENCOUNTER — ANTICOAG VISIT (OUTPATIENT)
Dept: CARDIOLOGY CLINIC | Facility: CLINIC | Age: 81
End: 2021-03-22

## 2021-03-22 DIAGNOSIS — I48.91 ATRIAL FIBRILLATION, UNSPECIFIED TYPE (HCC): ICD-10-CM

## 2021-03-23 NOTE — TELEPHONE ENCOUNTER
Per Elver Spear at [place] [address]  does not require prior authorization for patient's procedure

## 2021-03-23 NOTE — PRE-PROCEDURE INSTRUCTIONS
Pre-Surgery Instructions:   Medication Instructions    acetaminophen (TYLENOL) 325 mg tablet Pt will NOT take DOS     atorvastatin (LIPITOR) 40 mg tablet Pt will NOT take DOS     b complex-vitamin C-folic acid (NEPHROCAPS) 1 mg capsule Pt will NOT take DOS     bumetanide (BUMEX) 2 mg tablet Pt will NOT take DOS     diphenhydrAMINE (BENADRYL) 25 mg tablet Pt will NOT take DOS     docusate sodium (COLACE) 100 mg capsule Pt will NOT take DOS     potassium chloride (K-DUR,KLOR-CON) 20 mEq tablet Pt will NOT take DOS     Diuretic Med Class    Continue this medication up to the evening before surgery/procedure, but do not take the morning of the day of surgery  Acetaminophen Med Class    Continue to take this medication on your normal schedule  If this is an oral medication and you take it in the morning, then you may take this medicine with a sip of water  Statin Med Class    Continue to take this medication on your normal schedule  If this is an oral medication and you take it in the morning, then you may take this medicine with a sip of water  Stool Softener Med Class    Continue to take this medication on your normal schedule  If this is an oral medication and you take it in the morning, then you may take this medicine with a sip of water  Vitamin Med Class    You may continue to take any vitamin that your surgeon has prescribed to you up to the day before surgery  If your surgeon has not specifically prescribed this vitamin or instructed you to continue then stop taking 7 days prior to surgery  Warfarin Med Class    Consult with your Surgeon and prescribing Physician for exact timing of stopping this medication  Bridging anticoagulation may be needed if you have an artificial heart valve  Frequently, this medication is stopped 5 days prior to surgery  Approval to stop this medication should first come from your surgeon and/or prescribing physician  Completed DB with Pts grand daughter Srinivas Wolfe  Reviewed with Pts  Grand daughter Jaun Filter showering insts, med insts coumadin LD was 3/21/202  Advised NPO at [facility] for DOS 3/26/21 including candy etc   Advised [facility] will call 3/25/21 with final DOS details [phone]  Pts grand daughter verbalized understanding to all of the above

## 2021-03-26 ENCOUNTER — HOSPITAL ENCOUNTER (OUTPATIENT)
Facility: HOSPITAL | Age: 81
Setting detail: OUTPATIENT SURGERY
Discharge: HOME | End: 2021-03-26
Attending: SURGERY | Admitting: SURGERY
Payer: COMMERCIAL

## 2021-03-26 ENCOUNTER — ANESTHESIA (OUTPATIENT)
Dept: PERIOP | Facility: HOSPITAL | Age: 81
End: 2021-03-26
Payer: COMMERCIAL

## 2021-03-26 VITALS
RESPIRATION RATE: 16 BRPM | WEIGHT: 160 LBS | DIASTOLIC BLOOD PRESSURE: 57 MMHG | SYSTOLIC BLOOD PRESSURE: 104 MMHG | HEART RATE: 84 BPM | BODY MASS INDEX: 25.11 KG/M2 | HEIGHT: 67 IN | TEMPERATURE: 96 F | OXYGEN SATURATION: 100 %

## 2021-03-26 DIAGNOSIS — N18.6 ESRD (END STAGE RENAL DISEASE) (HCC): Primary | ICD-10-CM

## 2021-03-26 LAB
INR PPP: 1.42 (ref 0.84–1.19)
POTASSIUM SERPL-SCNC: 4.8 MMOL/L (ref 3.5–5.3)
PROTHROMBIN TIME: 17.4 SECONDS (ref 11.6–14.5)

## 2021-03-26 PROCEDURE — 36830 ARTERY-VEIN NONAUTOGRAFT: CPT | Performed by: PHYSICIAN ASSISTANT

## 2021-03-26 PROCEDURE — 84132 ASSAY OF SERUM POTASSIUM: CPT | Performed by: SURGERY

## 2021-03-26 PROCEDURE — 36830 ARTERY-VEIN NONAUTOGRAFT: CPT | Performed by: SURGERY

## 2021-03-26 PROCEDURE — 85610 PROTHROMBIN TIME: CPT | Performed by: SURGERY

## 2021-03-26 PROCEDURE — C1768 GRAFT, VASCULAR: HCPCS | Performed by: SURGERY

## 2021-03-26 DEVICE — PROPATEN VASCULAR GRAFT SW 4-7MMX45CM TAPERED HEPARIN
Type: IMPLANTABLE DEVICE | Site: ARM | Status: FUNCTIONAL
Brand: GORE PROPATEN VASCULAR GRAFT

## 2021-03-26 RX ORDER — SODIUM CHLORIDE 9 MG/ML
50 INJECTION, SOLUTION INTRAVENOUS CONTINUOUS
Status: DISCONTINUED | OUTPATIENT
Start: 2021-03-26 | End: 2021-03-26 | Stop reason: HOSPADM

## 2021-03-26 RX ORDER — HYDROMORPHONE HCL IN WATER/PF 6 MG/30 ML
0.2 PATIENT CONTROLLED ANALGESIA SYRINGE INTRAVENOUS
Status: DISCONTINUED | OUTPATIENT
Start: 2021-03-26 | End: 2021-03-26 | Stop reason: HOSPADM

## 2021-03-26 RX ORDER — CHLORHEXIDINE GLUCONATE 0.12 MG/ML
15 RINSE ORAL ONCE
Status: DISCONTINUED | OUTPATIENT
Start: 2021-03-26 | End: 2021-03-26 | Stop reason: CLARIF

## 2021-03-26 RX ORDER — ONDANSETRON 2 MG/ML
INJECTION INTRAMUSCULAR; INTRAVENOUS AS NEEDED
Status: DISCONTINUED | OUTPATIENT
Start: 2021-03-26 | End: 2021-03-26

## 2021-03-26 RX ORDER — HYDROCODONE BITARTRATE AND ACETAMINOPHEN 5; 325 MG/1; MG/1
1 TABLET ORAL EVERY 6 HOURS PRN
Qty: 15 TABLET | Refills: 0 | Status: SHIPPED | OUTPATIENT
Start: 2021-03-26 | End: 2021-04-05

## 2021-03-26 RX ORDER — LIDOCAINE HYDROCHLORIDE 10 MG/ML
INJECTION, SOLUTION EPIDURAL; INFILTRATION; INTRACAUDAL; PERINEURAL AS NEEDED
Status: DISCONTINUED | OUTPATIENT
Start: 2021-03-26 | End: 2021-03-26

## 2021-03-26 RX ORDER — FENTANYL CITRATE 50 UG/ML
INJECTION, SOLUTION INTRAMUSCULAR; INTRAVENOUS AS NEEDED
Status: DISCONTINUED | OUTPATIENT
Start: 2021-03-26 | End: 2021-03-26

## 2021-03-26 RX ORDER — HEPARIN SODIUM 1000 [USP'U]/ML
INJECTION, SOLUTION INTRAVENOUS; SUBCUTANEOUS AS NEEDED
Status: DISCONTINUED | OUTPATIENT
Start: 2021-03-26 | End: 2021-03-26

## 2021-03-26 RX ORDER — EPHEDRINE SULFATE 50 MG/ML
INJECTION INTRAVENOUS AS NEEDED
Status: DISCONTINUED | OUTPATIENT
Start: 2021-03-26 | End: 2021-03-26

## 2021-03-26 RX ORDER — PROPOFOL 10 MG/ML
INJECTION, EMULSION INTRAVENOUS AS NEEDED
Status: DISCONTINUED | OUTPATIENT
Start: 2021-03-26 | End: 2021-03-26

## 2021-03-26 RX ORDER — CEFAZOLIN SODIUM 1 G/50ML
1000 SOLUTION INTRAVENOUS ONCE
Status: COMPLETED | OUTPATIENT
Start: 2021-03-26 | End: 2021-03-26

## 2021-03-26 RX ADMIN — PROPOFOL 150 MG: 10 INJECTION, EMULSION INTRAVENOUS at 09:24

## 2021-03-26 RX ADMIN — PHENYLEPHRINE HYDROCHLORIDE 100 MCG: 10 INJECTION INTRAVENOUS at 10:19

## 2021-03-26 RX ADMIN — EPHEDRINE SULFATE 10 MG: 50 INJECTION, SOLUTION INTRAVENOUS at 10:08

## 2021-03-26 RX ADMIN — PHENYLEPHRINE HYDROCHLORIDE 40 MCG/MIN: 10 INJECTION INTRAVENOUS at 10:19

## 2021-03-26 RX ADMIN — PROPOFOL 20 MG: 10 INJECTION, EMULSION INTRAVENOUS at 11:15

## 2021-03-26 RX ADMIN — PHENYLEPHRINE HYDROCHLORIDE 100 MCG: 10 INJECTION INTRAVENOUS at 10:01

## 2021-03-26 RX ADMIN — PHENYLEPHRINE HYDROCHLORIDE 100 MCG: 10 INJECTION INTRAVENOUS at 09:24

## 2021-03-26 RX ADMIN — LIDOCAINE HYDROCHLORIDE 50 MG: 10 INJECTION, SOLUTION EPIDURAL; INFILTRATION; INTRACAUDAL; PERINEURAL at 09:24

## 2021-03-26 RX ADMIN — EPHEDRINE SULFATE 10 MG: 50 INJECTION, SOLUTION INTRAVENOUS at 09:47

## 2021-03-26 RX ADMIN — FENTANYL CITRATE 25 MCG: 50 INJECTION INTRAMUSCULAR; INTRAVENOUS at 09:43

## 2021-03-26 RX ADMIN — FENTANYL CITRATE 25 MCG: 50 INJECTION INTRAMUSCULAR; INTRAVENOUS at 09:24

## 2021-03-26 RX ADMIN — ONDANSETRON 4 MG: 2 INJECTION INTRAMUSCULAR; INTRAVENOUS at 11:36

## 2021-03-26 RX ADMIN — SODIUM CHLORIDE 50 ML/HR: 0.9 INJECTION, SOLUTION INTRAVENOUS at 08:15

## 2021-03-26 RX ADMIN — FENTANYL CITRATE 25 MCG: 50 INJECTION INTRAMUSCULAR; INTRAVENOUS at 09:54

## 2021-03-26 RX ADMIN — CEFAZOLIN SODIUM 1000 MG: 1 SOLUTION INTRAVENOUS at 09:33

## 2021-03-26 RX ADMIN — EPHEDRINE SULFATE 5 MG: 50 INJECTION, SOLUTION INTRAVENOUS at 09:54

## 2021-03-26 RX ADMIN — PHENYLEPHRINE HYDROCHLORIDE 100 MCG: 10 INJECTION INTRAVENOUS at 09:37

## 2021-03-26 RX ADMIN — EPHEDRINE SULFATE 10 MG: 50 INJECTION, SOLUTION INTRAVENOUS at 09:50

## 2021-03-26 RX ADMIN — ONDANSETRON 4 MG: 2 INJECTION INTRAMUSCULAR; INTRAVENOUS at 09:19

## 2021-03-26 RX ADMIN — PHENYLEPHRINE HYDROCHLORIDE 100 MCG: 10 INJECTION INTRAVENOUS at 10:17

## 2021-03-26 RX ADMIN — HEPARIN SODIUM 4000 UNITS: 1000 INJECTION INTRAVENOUS; SUBCUTANEOUS at 10:13

## 2021-03-26 RX ADMIN — PHENYLEPHRINE HYDROCHLORIDE 100 MCG: 10 INJECTION INTRAVENOUS at 09:54

## 2021-03-26 RX ADMIN — FENTANYL CITRATE 25 MCG: 50 INJECTION INTRAMUSCULAR; INTRAVENOUS at 10:11

## 2021-03-26 NOTE — ANESTHESIA PREPROCEDURE EVALUATION
Procedure:  CREATION FISTULA ARTERIOVENOUS (AV) POSSIBLE LEFT RADIOCEPHALIC, POSSIBLE LEFT FOREARM LOOP GRAFT (Left Arm Upper)    Relevant Problems   CARDIO   (+) Atrial fibrillation (HCC)   (+) Benign essential hypertension   (+) GONZALES (dyspnea on exertion)   (+) Dissection of thoracic aorta (HCC)   (+) Hyperlipidemia   (+) Paroxysmal atrial fibrillation (HCC)      ENDO   (+) 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 (HCC)      /RENAL   (+) Benign prostatic hyperplasia without lower urinary tract symptoms   (+) Chronic kidney disease-mineral and bone disorder   (+) ESRD (end stage renal disease) (HCC)      HEMATOLOGY   (+) Anemia      MUSCULOSKELETAL   (+) Lumbar pain   (+) Rheumatoid arthritis (HCC)   (+) Secondary osteoarthritis of right shoulder      PULMONARY   (+) GONZALES (dyspnea on exertion)        Physical Exam    Airway    Mallampati score: II         Dental   upper dentures and lower dentures,     Cardiovascular  Rhythm: regular, Rate: normal,     Pulmonary  Breath sounds clear to auscultation,     Other Findings        Anesthesia Plan  ASA Score- 4     Anesthesia Type- general with ASA Monitors  Additional Monitors:   Airway Plan: LMA  Plan Factors-    Chart reviewed  EKG reviewed  Patient is not a current smoker  Induction- intravenous  Postoperative Plan-     Informed Consent- Anesthetic plan and risks discussed with patient  I personally reviewed this patient with the CRNA  Discussed and agreed on the Anesthesia Plan with the CRNA  Chuy Rodríguez

## 2021-03-26 NOTE — ANESTHESIA POSTPROCEDURE EVALUATION
Post-Op Assessment Note    CV Status:  Stable  Pain Score: 0    Pain management: adequate     Mental Status:  Alert   Hydration Status:  Stable   PONV Controlled:  None   Airway Patency:  Patent      Post Op Vitals Reviewed: Yes      Staff: CRNA         No complications documented      BP   161/64   Temp   97F   Pulse  89   Resp   20   SpO2   100

## 2021-03-26 NOTE — OP NOTE
OPERATIVE REPORT  PATIENT NAME: Vickie SEBASTIAN Core    :  1940  MRN: [id number]  Pt Location: [facility] OR ROOM 03    SURGERY DATE: 3/26/2021    Surgeon(s) and Role:     * Odalys Carreno MD - Primary     * Kris Taylor PA-C - Assisting    ESRD (end stage renal disease) ([facility] ) [N18 6]    Post-Op Diagnosis Codes:     * ESRD (end stage renal disease) ([facility] ) [N18 6]      Procedure(s):  LEFT FOREARM LOOP AV GRAFT    Specimen(s):  * No specimens in log *    Estimated Blood Loss:   5 mL    Drains:  none    Anesthesia Type:   General/LMA    Operative Indications:  ESRD (end stage renal disease) ([facility] ) [N18 6]      Operative Findings:    Median cubital vein of good quality at 6 mm  Brachial artery also of good quality at approximately 6 mm  At the conclusion the procedure there was easily palpable thrill within the fistula and a palpable radial pulse  Motor and sensory function was intact  Complications:   None    Procedure and Technique:    A qualified surgical resident was not available for this case  Assistance was required to aid in dissection, retraction and creation of arterial anastomosis  Anesthesia was administered  The left arm was prepped and draped in the normal sterile fashion with chlorhexidine prep  Una Pelt was placed  The cephalic vein above and below the median cubital vein as well as the deep perforating vein were identified through the incision and encircled with vessel loops  The veins appeared to be of good quality and good diameter,  6 mm  A transverse incision was made overlying the brachial pulse just below the elbow  Bovie cautery was used for hemostasis  The brachial artery was isolated over a 2-3 cm segment and encircled with vessel loops  The ulnar and radial artery were also exposed and encircled with vessel loops     Any branches were clipped  A 2nd transverse incision was made in the distal forearm  Bovie cautery was used for hemostasis  A small flap was created cephalad      A tunnel tract was then created with a [place] Heater tunneler in a looped fashion between the 2 incisions and a 4-7 mm tapered Propatent graft was passed through the tunnel  IV heparin was administered  The brachial artery and branches were occluded proximally and distally using a combination of vessel loops and vascular clamps  An arteriotomy was created on the anterior surface  The graft was then spatulated at the appropriate length and an anastomosis was created with a running 6 0 Waverly-Jayden suture  Once this anastomosis was nearly completed all vessels were back bled and flushed appropriately  The anastomosis was then completed and flow was restored and the graft was flushed  Pulsatile flow was noted through the graft  The graft was then clamped proximally and any residual blood was aspirated and flushed  The outflow vein was then occluded proximally and distally with combination of vessel loops and vascular clamps  A venotomy was then created  The graft was then spatulated at the appropriate length and anastomosed with a running 6 0 Waverly-Jayden suture  Once this was nearly completed the graft was flushed and the vein was back bled  The anastomosis was completed and flow was restored  No bleeding points were encounter  The brachial artery was then interrogated proximal and distal to the graft anastomosis  There was a good biphasic signal in the brachial artery beyond the fistula with and without graft compression  There was good continuous flow within the outflow vein  The wounds were irrigated with saline solution  Any bleeding points were clipped or coagulated  The deep tissues were reapproximated with interrupted 3 0 Monocryl suture  Local anesthetic was injected in the maranda-incisional tissue  A 4 Monocryl subcuticular skin closure was performed  A Histacryl dressing was placed  The patient was then awoken from anesthesia and transferred to the recovery room         I was present for the entire procedure    Patient Disposition:  PACU     Vascular Quality Initiative - Hemodialysis Access Placement    Previous Access: forearm fistula (none )    Status: Outpatient    Side:left    Arterial Anastomosis: Brachial, antecubital    Completion Fistulogram: no    Anesthesia: General    Access Type: Prosthetic AV graft, looped     Venous Anastomosis: Cephalic, antecubital    Preop ARTERIAL evaluation and/or treatment: duplex    Preop VENOUS evaluation and/or treatment: ultrasound mapping    *Obtain Target Diameters from study      SIGNATURE: Shamika Scott MD  DATE: March 26, 2021  TIME: 11:58 AM

## 2021-03-26 NOTE — INTERVAL H&P NOTE
H&P reviewed  After examining the patient I find no changes in the patients condition since the H&P had been written      Vitals:    03/26/21 0746   BP: 146/81   Pulse: 90   Resp: 16   Temp: 97 5 °F (36 4 °C)   SpO2: 98%

## 2021-03-29 ENCOUNTER — TELEPHONE (OUTPATIENT)
Dept: CARDIOLOGY CLINIC | Facility: CLINIC | Age: 81
End: 2021-03-29

## 2021-03-29 ENCOUNTER — TELEPHONE (OUTPATIENT)
Dept: VASCULAR SURGERY | Facility: CLINIC | Age: 81
End: 2021-03-29

## 2021-03-29 ENCOUNTER — OFFICE VISIT (OUTPATIENT)
Dept: VASCULAR SURGERY | Facility: CLINIC | Age: 81
End: 2021-03-29

## 2021-03-29 VITALS
HEIGHT: 67 IN | SYSTOLIC BLOOD PRESSURE: 104 MMHG | DIASTOLIC BLOOD PRESSURE: 57 MMHG | WEIGHT: 160 LBS | BODY MASS INDEX: 25.11 KG/M2 | TEMPERATURE: 98.8 F

## 2021-03-29 DIAGNOSIS — E87.6 HYPOKALEMIA: ICD-10-CM

## 2021-03-29 DIAGNOSIS — N18.6 ESRD (END STAGE RENAL DISEASE) (HCC): Primary | ICD-10-CM

## 2021-03-29 PROCEDURE — 99024 POSTOP FOLLOW-UP VISIT: CPT | Performed by: SURGERY

## 2021-03-29 RX ORDER — POTASSIUM CHLORIDE 20 MEQ/1
20 TABLET, EXTENDED RELEASE ORAL 2 TIMES DAILY
Qty: 60 TABLET | Refills: 5 | Status: SHIPPED | OUTPATIENT
Start: 2021-03-29 | End: 2021-01-01

## 2021-03-29 NOTE — LETTER
March 29, 2021     [facility] [address]    Patient: Eliecer Sharpe   YOB: 1940   Date of Visit: 3/29/2021       Dear   [facility]:    Thank you for referring [facility] to me for evaluation  Below are the relevant portions of my assessment and plan of care  ESRD (end stage renal disease) ([facility] )   Chronic renal failure status post creation of left forearm loop graft  Patient with moderate swelling of the forearm and hand with associated discomfort as expected following surgery  Will plan re-evaluation in 7-10 days  Will use light compressive Tubigrip  Advised to elevate  If you have questions, please do not hesitate to call me  I look forward to following [facility] along with you           Sincerely,        Juan Ramírez MD        CC: EL Hathaway

## 2021-03-29 NOTE — ASSESSMENT & PLAN NOTE
Chronic renal failure status post creation of left forearm loop graft  Patient with moderate swelling of the forearm and hand with associated discomfort as expected following surgery  Will plan re-evaluation in 7-10 days  Will use light compressive Tubigrip  Advised to elevate

## 2021-03-29 NOTE — PROGRESS NOTES
Assessment/Plan:    ESRD (end stage renal disease) ([facility] )   Chronic renal failure status post creation of left forearm loop graft  Patient with moderate swelling of the forearm and hand with associated discomfort as expected following surgery  Will plan re-evaluation in 7-10 days  Will use light compressive Tubigrip  Advised to elevate  Diagnoses and all orders for this visit:    ESRD (end stage renal disease) ([facility] )          Subjective:      Patient ID: Chang Diaz is a 80 y o  male  Patient presents today s/p left forearm loop AV graft done 3/26/21 by Dr Yves Kevin  Patient has swelling from the elbow into the hand that started 3/27/21  Patient has pain 8/10  Patient has HD M/W/F at [place] [address]  Negative thrill and slight bruit  Patient is taking Warfarin and Atorvastatin  72-year-old status post creation of left forearm loop AV graft 03/26/2021  Patient has noticed increased swelling over the past few days with associated discomfort of the forearm  He notes no numbness or weakness of the hand  He is in fact become very active and using the left arm relatively normally  On examination there is mild/ moderate erythema of the entire forearm along with associated swelling of the forearm and the hand  There is a good thrill and bruit throughout the fistula  Wounds are healing as expected  The following portions of the patient's history were reviewed and updated as appropriate: allergies, current medications, past family history, past medical history, past social history, past surgical history and problem list     I have reviewed and made appropriate changes to the review of systems input by the medical assistant      Vitals:    03/29/21 1643   BP: 104/57   Temp: 98 8 °F (37 1 °C)   TempSrc: Tympanic   Weight: 72 6 kg (160 lb)   Height: 5' 7" (1 702 m)       Patient Active Problem List   Diagnosis    Rotator cuff tear arthropathy, right    Secondary osteoarthritis of right shoulder    Benign essential hypertension    Abnormal TSH    Overweight (BMI 25 0-29  9)    Lumbar pain    Benign prostatic hyperplasia without lower urinary tract symptoms    Bradycardia    Dissection of thoracic aorta (HCC)    S/P aorta repair    Anticoagulation goal of INR 2 to 3    Hyperphosphatemia    Hypotension    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    Complication of vascular dialysis catheter    Hyperlipidemia    Sleep disorder    Encounter for post surgical wound check    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    Monoclonal gammopathy    Paroxysmal atrial fibrillation (HCC)    GONZALES (dyspnea on exertion)    Acute on chronic diastolic (congestive) heart failure (HCC)    Anemia    Chronic kidney disease-mineral and bone disorder    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 [facility] )    Rheumatoid arthritis ([facility] )    Atrial fibrillation (HCC)    Insomnia    ESRD (end stage renal disease) ([facility] )       Past Surgical History:   Procedure Laterality Date    APPENDECTOMY      CARDIAC SURGERY      COLONOSCOPY  2017    FRACTURE SURGERY      IR PERITONEAL DIALYSIS CATHETER PLACEMENT  2/3/2021    IR PERITONEAL DIALYSIS CATHETER REMOVAL  2/17/2021    IR PERITONEAL DIALYSIS CATHETER REMOVAL AND PLACEMENT NEW SITE  2/9/2021    IR TEMPORARY DIALYSIS CATHETER PLACEMENT  12/23/2019    IR THORACENTESIS  12/24/2019    IR THORACENTESIS  12/27/2019    IR TUNNELED DIALYSIS CATHETER CHECK/CHANGE/REPOSITION/ANGIOPLASTY  1/6/2020    IR TUNNELED DIALYSIS CATHETER PLACEMENT  1/2/2020    IR TUNNELED DIALYSIS CATHETER PLACEMENT  2/15/2021    IR TUNNELED DIALYSIS CATHETER REMOVAL  3/4/2020    NJ ANASTOMOSIS,AV,ANY SITE Left 3/26/2021    Procedure: LEFT FOREARM LOOP AV GRAFT;  Surgeon: Michael Ortega MD;  [facility];  Service: Vascular    NJ ASCEND AORTA GRAFT W ROOT REPLACMENT  VALVE CONDUIT/CORON RECONSTRUCT N/A 12/15/2019    Procedure: BENTALL PROCEDURE (ASCENDING AORTIC REPAIR) with 26mm Gelweave Graft;  Surgeon: Deondre Layton DO;  [facility];  Service: Cardiac Surgery    OR RECONSTR TOTAL SHOULDER IMPLANT Right 2017    Procedure: ARTHROPLASTY SHOULDER REVERSE with OPEN CYST EXCISION;  Surgeon: Sudhir Sainz MD;  [facility];  Service: Orthopedics    SHOULDER SURGERY      WRIST SURGERY         Family History   Problem Relation Age of Onset    No Known Problems Mother     Hypertension Father     Arthritis Family     No Known Problems Daughter        Social History     Socioeconomic History    Marital status: /Civil Union     Spouse name: Not on file    Number of children: Not on file    Years of education: Not on file    Highest education level: Not on file   Occupational History    Not on file   Social Needs    Financial resource strain: Not hard at all   Knowthena insecurity     Worry: Never true     Inability: Never true   [employer] needs     Medical: No     Non-medical: No   Tobacco Use    Smoking status: Former Smoker     Packs/day:  00     Quit date:      Years since quittin 2    Smokeless tobacco: Never Used   Substance and Sexual Activity    Alcohol use: Not Currently     Frequency: Never     Drinks per session: Patient refused     Binge frequency: Never    Drug use: Not Currently    Sexual activity: Not Currently     Partners: Female   Lifestyle    Physical activity     Days per week: Not on file     Minutes per session: Not on file    Stress: Not on file   Relationships    Social connections     Talks on phone: Not on file     Gets together: Not on file     Attends Rastafari service: Not on file     Active member of club or organization: Not on file     Attends meetings of clubs or organizations: Not on file     Relationship status: Not on file    Intimate partner violence     Fear of current or ex partner: Not on file     Emotionally abused: Not on file     Physically abused: Not on file     Forced sexual activity: Not on file   Other Topics Concern    Not on file   Social History Narrative    Daily caffeine consumption, 1 serving a day    Drinks coffee        No Known Allergies      Current Outpatient Medications:     acetaminophen (TYLENOL) 325 mg tablet, Take 1-2 tabs q6h PRN mild fever/pain, Disp: 90 tablet, Rfl: 0    atorvastatin (LIPITOR) 40 mg tablet, TAKE 1 TABLET (40 MG TOTAL) BY MOUTH DAILY, Disp: 90 tablet, Rfl: 0    b complex-vitamin C-folic acid (NEPHROCAPS) 1 mg capsule, Take 1 capsule by mouth daily with dinner, Disp: 30 capsule, Rfl: 0    bumetanide (BUMEX) 2 mg tablet, TAKE 2 TABLETS (4 MG TOTAL) BY MOUTH 2 (TWO) TIMES A DAY, Disp: 60 tablet, Rfl: 3    diphenhydrAMINE (BENADRYL) 25 mg tablet, Take 25 mg by mouth every 6 (six) hours as needed for itching, Disp: , Rfl:     docusate sodium (COLACE) 100 mg capsule, TAKE 1 CAPSULE (100 MG TOTAL) BY MOUTH 2 (TWO) TIMES A DAY, Disp: 30 capsule, Rfl: 3    HYDROcodone-acetaminophen (NORCO) 5-325 mg per tablet, Take 1 tablet by mouth every 6 (six) hours as needed for pain for up to 10 daysMax Daily Amount: 4 tablets, Disp: 15 tablet, Rfl: 0    potassium chloride (K-DUR,KLOR-CON) 20 mEq tablet, TAKE 1 TABLET (20 MEQ TOTAL) BY MOUTH 2 (TWO) TIMES A DAY, Disp: 60 tablet, Rfl: 5    warfarin (COUMADIN) 2 mg tablet, Anticoagulation needed for afib, Disp: 30 tablet, Rfl: 0    Review of Systems   Constitutional: Negative  HENT: Negative  Eyes: Negative  Respiratory: Negative  Cardiovascular: Negative  Arm swelling   Gastrointestinal: Negative  Endocrine: Negative  Genitourinary: Negative  Musculoskeletal: Positive for back pain (chronic)  Skin: Positive for color change  Allergic/Immunologic: Negative  Neurological: Negative  Hematological: Bruises/bleeds easily  Psychiatric/Behavioral: Negative            Objective:      /57   Temp 98 8 °F (37 1 °C) (Tympanic)   Ht 5' 7" (1 702 m) Wt 72 6 kg (160 lb)   BMI 25 06 kg/m²          Physical Exam

## 2021-03-29 NOTE — PATIENT INSTRUCTIONS
ESRD (end stage renal disease) ([facility] )   Chronic renal failure status post creation of left forearm loop graft  Patient with moderate swelling of the forearm and hand with associated discomfort as expected following surgery  Will plan re-evaluation in 7-10 days  Will use light compressive Tubigrip  Advised to elevate

## 2021-03-29 NOTE — TELEPHONE ENCOUNTER
Patient known to me  S/p LUE forearm loop graft ([facility] 3/26/21)  Daughter calling with concern for worsening swelling and pain  Patient is on coumadin and has not been taking pain medication as prescribed  Given increased swelling and pain, patient should be evaluated in the office today after dialysis  Discussed w/ Hailey @ [facility]

## 2021-03-29 NOTE — TELEPHONE ENCOUNTER
Granddaughter called and said for the last week after dialysis he is getting dizzy and weaker  Advised to take his Bp after he gets home from dialysis and is resting for 5 mins  Verbally understood  She will call back with the BP and HR

## 2021-03-29 NOTE — TELEPHONE ENCOUNTER
Vascular Nurse Navigator Post Op Call    Procedure: LEFT FOREARM LOOP AV GRAFT    Date of Procedure: 3/26/21    Surgeon: Dr Jorge Owens       Painful tingling or numbness in your fingers?: No    Paleness/Coolness in hands/fingers?: No    Redness, swelling or pus from your wound?: Swelling from the elbow down to the hand    Bleeding?: No    Thrill present?: Pt's daughter unable to feel due to swelling  Anticoagulation?: Warfarin (Coumadin or Jantoven)    Statin?: Lipitor (atorvastatin)    Fever/chills?: No    Uncontrolled Pain?: Pt complaining of 8/10 surgical site pain  He has been taking tylenol without much relief  Pt was ordered norco post-op, but pt's daughter states he does not take it because he doesn't like the way it makes him feel  Reviewed discharge instructions and incision care with patient  [facility] [address]    NEXT SCHEDULED OFFICE VISIT: 4/15/21    Transportation Confirmed?: Yes        Any Questions or Concerns? Pt's daughter called w/ concerns about the swelling in pt's arm from the elbow down to the hand  She reports that the swelling feels very firm and pt's hand is bright red  She says pt is trying to elevate the arm  He is also complaining of a lot of surgical site pain but will not take the norco  Did advise that some swelling is normal after this procedure but I will send a message to our triage provider  Pt has dialysis today at 10:30  We can call the daughter back at [phone]

## 2021-03-29 NOTE — TELEPHONE ENCOUNTER
Catalina Fung called back with BP readings from [facility] all pre and post dialysis  3//72, 107/64  3//70, 106/57  3//79, 120/62  3//77, 118/61  3//95, 111/75  3//68  141/82  3//68, 102/74    Today 109/61 at dialysis  Today at home after resting for 5 minutes with a wrist cuff-102/62, 128   C/o increased fatigue and dizziness after dialysis    [facility]# [phone]

## 2021-03-30 NOTE — TELEPHONE ENCOUNTER
Advised magali of recommendations, she will discuss with pt and call back if he is experiencing any of these s/s

## 2021-03-31 DIAGNOSIS — I50.20 SYSTOLIC CONGESTIVE HEART FAILURE, UNSPECIFIED HF CHRONICITY (HCC): ICD-10-CM

## 2021-03-31 DIAGNOSIS — I50.32 CHRONIC HEART FAILURE WITH PRESERVED EJECTION FRACTION (HCC): ICD-10-CM

## 2021-03-31 RX ORDER — BUMETANIDE 2 MG/1
4 TABLET ORAL 2 TIMES DAILY
Qty: 60 TABLET | Refills: 3 | Status: SHIPPED | OUTPATIENT
Start: 2021-03-31 | End: 2021-05-03

## 2021-04-05 DIAGNOSIS — N18.30 BENIGN HYPERTENSION WITH CKD (CHRONIC KIDNEY DISEASE) STAGE III (HCC): Chronic | ICD-10-CM

## 2021-04-05 DIAGNOSIS — I12.9 BENIGN HYPERTENSION WITH CKD (CHRONIC KIDNEY DISEASE) STAGE III (HCC): Chronic | ICD-10-CM

## 2021-04-08 ENCOUNTER — OFFICE VISIT (OUTPATIENT)
Dept: VASCULAR SURGERY | Facility: CLINIC | Age: 81
End: 2021-04-08

## 2021-04-08 VITALS
SYSTOLIC BLOOD PRESSURE: 118 MMHG | HEIGHT: 67 IN | DIASTOLIC BLOOD PRESSURE: 62 MMHG | RESPIRATION RATE: 16 BRPM | TEMPERATURE: 97.5 F | WEIGHT: 163 LBS | HEART RATE: 87 BPM | BODY MASS INDEX: 25.58 KG/M2

## 2021-04-08 DIAGNOSIS — Z95.828 STATUS POST PLACEMENT OF ARTERIOVENOUS GRAFT: ICD-10-CM

## 2021-04-08 DIAGNOSIS — N18.6 ESRD (END STAGE RENAL DISEASE) (HCC): Primary | ICD-10-CM

## 2021-04-08 PROCEDURE — 99024 POSTOP FOLLOW-UP VISIT: CPT | Performed by: SURGERY

## 2021-04-08 NOTE — ASSESSMENT & PLAN NOTE
ESRD on HD via permacath s/p recent LUE AVG placement by Dr Billy Baum 3/26/2021  The left forearm swelling and erythema has decreased significantly, incisions are healing  Pain is improved  Palpable thrill over LUE AVG      -Has follow-up scheduled with Dr Billy Baum mid-April to assess for full healing/clear for dialysis  -Suspect will be ok to proceed with dialysis via AVG in about 2 weeks assuming swelling continues to subside and incisions fully heal

## 2021-04-08 NOTE — PROGRESS NOTES
Assessment/Plan:    Status post placement of arteriovenous graft  ESRD on HD via permacath s/p recent LUE AVG placement by Dr Brian Hernandez 3/26/2021  The left forearm swelling and erythema has decreased significantly, incisions are healing  Pain is improved  Palpable thrill over LUE AVG  -Has follow-up scheduled with Dr Brian Hernandez mid-April to assess for full healing/clear for dialysis  -Suspect will be ok to proceed with dialysis via AVG in about 2 weeks assuming swelling continues to subside and incisions fully heal         Diagnoses and all orders for this visit:    ESRD (end stage renal disease) ([facility] )    Status post placement of arteriovenous graft        I have spent 15 minutes with Patient  today in which greater than 50% of this time was spent in counseling/coordination of care regarding Intructions for management, Importance of tx compliance and Impressions  Subjective:      Patient ID: Mela Robbins is a 80 y o  male  Patient is s/p Lt forearm loop graft on 3/26/21 by Dr Brian Hernandez  Pt denies arm pain, numbness, or loss of motion  Pt is currently getting HD in [facility]  Pt goes to HD on M-W-F at [address] [place] [address]  There is a thrill and bruit  HPI  Mr Fady Putnam is a 71yo male with ESRD on HD via permacath s/p LUE AVG placement with Dr Brian Hernandez  He presents for early post-op follow-up due to erythema and swelling along the graft  The pain, swelling, erythema have all improved significantly  He has some mild swelling of the arm  The incisions appear to healing  There is a small scab over the counter incision  He denies any steal symptoms  The following portions of the patient's history were reviewed and updated as appropriate: allergies, current medications, past family history, past medical history, past social history, past surgical history and problem list     Review of Systems   Constitutional: Negative  HENT: Negative  Eyes: Negative  Respiratory: Negative  Cardiovascular: Negative  Gastrointestinal: Negative  Endocrine: Negative  Genitourinary: Negative  Musculoskeletal: Positive for arthralgias and myalgias  Cramping pain in legs   Skin: Negative  Allergic/Immunologic: Negative  Neurological: Negative  Hematological: Negative  Psychiatric/Behavioral: Negative  I have personally reviewed the ROS entered by MA and agree as documented  Objective:      /62 (BP Location: Right arm, Patient Position: Sitting)   Pulse 87   Temp 97 5 °F (36 4 °C) (Tympanic)   Resp 16   Ht 5' 7" (1 702 m)   Wt 73 9 kg (163 lb)   BMI 25 53 kg/m²          Physical Exam  Constitutional:       Appearance: Normal appearance  HENT:      Head: Normocephalic and atraumatic  Neck:      Musculoskeletal: Normal range of motion and neck supple  Cardiovascular:      Rate and Rhythm: Normal rate  Arteriovenous access: left arteriovenous access is present  Comments: Palpable thrill over LUE AVG  Pulmonary:      Effort: Pulmonary effort is normal    Musculoskeletal: Normal range of motion  General: Swelling present  Comments: Mild left forearm swelling   Skin:     General: Skin is warm and dry  Capillary Refill: Capillary refill takes less than 2 seconds  Comments: Minimal erythema over left arm  Incisions healing, small dry residual scab over left arm counter incision   Neurological:      General: No focal deficit present  Mental Status: He is alert and oriented to person, place, and time  Psychiatric:         Mood and Affect: Mood normal          Behavior: Behavior normal          Thought Content:  Thought content normal          Judgment: Judgment normal

## 2021-04-08 NOTE — PATIENT INSTRUCTIONS
Status post placement of arteriovenous graft  ESRD on HD via permacath s/p recent LUE AVG placement by Dr Dante Garcia 3/26/2021  The left forearm swelling and erythema has decreased significantly, incisions are healing  Pain is improved  Palpable thrill over LUE AVG       -Has follow-up scheduled with Dr Dante Garcia mid-April to assess for full healing/clear for dialysis  -Suspect will be ok to proceed with dialysis via AVG in about 2 weeks assuming swelling continues to subside and incisions fully heal

## 2021-04-13 ENCOUNTER — OFFICE VISIT (OUTPATIENT)
Dept: FAMILY MEDICINE CLINIC | Facility: CLINIC | Age: 81
End: 2021-04-13
Payer: COMMERCIAL

## 2021-04-13 ENCOUNTER — ANTICOAG VISIT (OUTPATIENT)
Dept: CARDIOLOGY CLINIC | Facility: CLINIC | Age: 81
End: 2021-04-13

## 2021-04-13 ENCOUNTER — APPOINTMENT (OUTPATIENT)
Dept: LAB | Facility: CLINIC | Age: 81
End: 2021-04-13
Payer: COMMERCIAL

## 2021-04-13 VITALS
HEIGHT: 67 IN | BODY MASS INDEX: 25.8 KG/M2 | TEMPERATURE: 97.8 F | WEIGHT: 164.4 LBS | SYSTOLIC BLOOD PRESSURE: 118 MMHG | DIASTOLIC BLOOD PRESSURE: 60 MMHG | HEART RATE: 88 BPM | OXYGEN SATURATION: 99 % | RESPIRATION RATE: 16 BRPM

## 2021-04-13 DIAGNOSIS — E78.2 MIXED HYPERLIPIDEMIA: ICD-10-CM

## 2021-04-13 DIAGNOSIS — N18.6 ESRD (END STAGE RENAL DISEASE) (HCC): ICD-10-CM

## 2021-04-13 DIAGNOSIS — I48.91 ATRIAL FIBRILLATION, UNSPECIFIED TYPE (HCC): ICD-10-CM

## 2021-04-13 DIAGNOSIS — I10 BENIGN ESSENTIAL HYPERTENSION: Chronic | ICD-10-CM

## 2021-04-13 DIAGNOSIS — I50.33 ACUTE ON CHRONIC DIASTOLIC (CONGESTIVE) HEART FAILURE (HCC): Primary | ICD-10-CM

## 2021-04-13 DIAGNOSIS — M06.9 RHEUMATOID ARTHRITIS, INVOLVING UNSPECIFIED SITE, UNSPECIFIED WHETHER RHEUMATOID FACTOR PRESENT (HCC): ICD-10-CM

## 2021-04-13 PROCEDURE — 3074F SYST BP LT 130 MM HG: CPT | Performed by: NURSE PRACTITIONER

## 2021-04-13 PROCEDURE — 3078F DIAST BP <80 MM HG: CPT | Performed by: NURSE PRACTITIONER

## 2021-04-13 PROCEDURE — 99214 OFFICE O/P EST MOD 30 MIN: CPT | Performed by: NURSE PRACTITIONER

## 2021-04-13 NOTE — PROGRESS NOTES
FAMILY PRACTICE OFFICE VISIT       NAME: Oliver Sharpe  AGE: 80 y o  SEX: male       : 1940        MRN: [id number]      Assessment and Plan     Problem List Items Addressed This Visit        Cardiovascular and Mediastinum    Benign essential hypertension (Chronic)     Stable on Bumex  Acute on chronic diastolic (congestive) heart failure (HCC) - Primary     Wt Readings from Last 3 Encounters:   04/15/21 73 9 kg (163 lb)   21 74 6 kg (164 lb 6 4 oz)   21 73 9 kg (163 lb)        Appears euvolemic today  Continue Bumex  Continue follow-up with Cardiology, Dr Christy Sepulveda  Atrial fibrillation (HCC)     Stable  Regular rate and rhythm today  Anticoagulated on coumadin  Following with cardiology, Dr Christy Sepulveda  Musculoskeletal and Integument    Rheumatoid arthritis [facility])       Patient declines rheumatology follow-up  Genitourinary    ESRD (end stage renal disease) ([facility] )     Lab Results   Component Value Date    EGFR 23 2021    EGFR 17 2021    EGFR 16 2021    CREATININE 2 57 (H) 2021    CREATININE 3 35 (H) 2021    CREATININE 3 45 (H) 2021     Receiving Hemodialysis  Right upper chest wall dialysis catheter in place  S/P placement of left forearm arteriovenous graft  Other    Hyperlipidemia     Last lipid panel completed in January with total cholesterol 88, triglycerides 69, HDL 42, LDL 32  Will repeat lipid panel  May consider reduction in statin dose if numbers remain stable  Relevant Orders    Lipid panel        1  Acute on chronic diastolic (congestive) heart failure ([facility] )     2  Atrial fibrillation, unspecified type ([facility] )     3  Benign essential hypertension     4  ESRD (end stage renal disease) ([facility] )     5  Rheumatoid arthritis, involving unspecified site, unspecified whether rheumatoid factor present ([facility] )     6  Mixed hyperlipidemia  Lipid panel     Follow-up in 4 months        Chief Complaint     Chief Complaint   Patient presents with    Follow-up     Pt is here for 3 mos f/u       History of Present Illness     Oliver Sharpe  Is a 68-year-old male presenting today for follow-up on chronic conditions  States doing well, feeling well  Denies shortness of breath or edema  S/P left arm arteriovenous graft, he states is healing well  Hemodialysis catheter in right upper chest wall is itchy  Review of Systems   Review of Systems   Constitutional: Negative  HENT: Negative  Respiratory: Negative for cough and shortness of breath  Cardiovascular: Negative for chest pain, palpitations and leg swelling  Gastrointestinal: Negative  Musculoskeletal: Negative  Neurological: Negative  Active Problem List     Patient Active Problem List   Diagnosis    Rotator cuff tear arthropathy, right    Secondary osteoarthritis of right shoulder    Benign essential hypertension    Overweight (BMI 25 0-29  9)    Lumbar pain    Benign prostatic hyperplasia without lower urinary tract symptoms    Bradycardia    Dissection of thoracic aorta (HCC)    S/P aorta repair    Anticoagulation goal of INR 2 to 3    Hyperphosphatemia    Hypotension    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    Complication of vascular dialysis catheter    Hyperlipidemia    Sleep disorder    Encounter for post surgical wound check    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    Monoclonal gammopathy    Paroxysmal atrial fibrillation (HCC)    GONZALES (dyspnea on exertion)    Acute on chronic diastolic (congestive) heart failure (HCC)    Anemia    Chronic kidney disease-mineral and bone disorder    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 ([address] )    Rheumatoid arthritis ([address] )    Atrial fibrillation (HCC)    Insomnia    ESRD (end stage renal disease) ([address] )    Status post placement of arteriovenous graft       Past Medical History:  Past Medical History:   Diagnosis Date    Acute renal failure superimposed on stage 4 chronic kidney disease ([facility] ) 10/4/2020    Arthritis     BPH without urinary obstruction     CKD (chronic kidney disease), stage III     baseline 1 6-1 7    GERD (gastroesophageal reflux disease)     Hyperlipidemia     Hypertension     MI (myocardial infarction) ([facility] )        Past Surgical History:  Past Surgical History:   Procedure Laterality Date    APPENDECTOMY      CARDIAC SURGERY      COLONOSCOPY  2017    FRACTURE SURGERY      IR PERITONEAL DIALYSIS CATHETER PLACEMENT  2/3/2021    IR PERITONEAL DIALYSIS CATHETER REMOVAL  2/17/2021    IR PERITONEAL DIALYSIS CATHETER REMOVAL AND PLACEMENT NEW SITE  2/9/2021    IR TEMPORARY DIALYSIS CATHETER PLACEMENT  12/23/2019    IR THORACENTESIS  12/24/2019    IR THORACENTESIS  12/27/2019    IR TUNNELED DIALYSIS CATHETER CHECK/CHANGE/REPOSITION/ANGIOPLASTY  1/6/2020    IR TUNNELED DIALYSIS CATHETER PLACEMENT  1/2/2020    IR TUNNELED DIALYSIS CATHETER PLACEMENT  2/15/2021    IR TUNNELED DIALYSIS CATHETER REMOVAL  3/4/2020    ND ANASTOMOSIS,AV,ANY SITE Left 3/26/2021    Procedure: LEFT FOREARM LOOP AV GRAFT;  Surgeon: Martha Celeste MD;  Location: [facility] MA[facility];  Service: Vascular    ND ASCEND AORTA GRAFT W ROOT REPLACMENT  VALVE CONDUIT/CORON RECONSTRUCT N/A 12/15/2019    Procedure: BENTALL PROCEDURE (ASCENDING AORTIC REPAIR) with 26mm Gelweave Graft;  Surgeon: Suzie Galvez DO;  Location: [facility];  Service: Cardiac Surgery    ND RECONSTR TOTAL SHOULDER IMPLANT Right 12/5/2017    Procedure: ARTHROPLASTY SHOULDER REVERSE with OPEN CYST EXCISION;  Surgeon: Mele Saunders MD;  Location: [facility] OR;  Service: Orthopedics    SHOULDER SURGERY      WRIST SURGERY         Family History:  Family History   Problem Relation Age of Onset    No Known Problems Mother     Hypertension Father     Arthritis Family     No Known Problems Daughter        Social History:  Social History     Socioeconomic History    Marital status: /Civil Union     Spouse name: Not on file    Number of children: Not on file    Years of education: Not on file    Highest education level: Not on file   Occupational History    Not on file   Social Needs    Financial resource strain: Not hard at all   [place] insecurity     Worry: Never true     Inability: Never true   [employer] needs     Medical: No     Non-medical: No   Tobacco Use    Smoking status: Former Smoker     Packs/day: 1 00     Quit date:      Years since quittin 3    Smokeless tobacco: Never Used   Substance and Sexual Activity    Alcohol use: Not Currently     Frequency: Never     Drinks per session: Patient refused     Binge frequency: Never    Drug use: Not Currently    Sexual activity: Not Currently     Partners: Female   Lifestyle    Physical activity     Days per week: Not on file     Minutes per session: Not on file    Stress: Not on file   Relationships    Social connections     Talks on phone: Not on file     Gets together: Not on file     Attends Synagogue service: Not on file     Active member of club or organization: Not on file     Attends meetings of clubs or organizations: Not on file     Relationship status: Not on file    Intimate partner violence     Fear of current or ex partner: Not on file     Emotionally abused: Not on file     Physically abused: Not on file     Forced sexual activity: Not on file   Other Topics Concern    Not on file   Social History Narrative    Daily caffeine consumption, 1 serving a day    Drinks coffee        I have reviewed the patient's medical history in detail; there are no changes to the history as noted in the electronic medical record      Objective     Vitals:    21 0949   BP: 118/60   Pulse: 88   Resp: 16   Temp: 97 8 °F (36 6 °C)   TempSrc: Temporal   SpO2: 99%   Weight: 74 6 kg (164 lb 6 4 oz)   Height: 5' 7" (1 702 m)     Wt Readings from Last 3 Encounters:   04/15/21 73 9 kg (163 lb)   21 74 6 kg (164 lb 6 4 oz)   04/08/21 73 9 kg (163 lb)     Physical Exam  Vitals signs and nursing note reviewed  Constitutional:       General: He is not in acute distress  Appearance: Normal appearance  He is not ill-appearing, toxic-appearing or diaphoretic  HENT:      Head: Normocephalic and atraumatic  Cardiovascular:      Rate and Rhythm: Normal rate and regular rhythm  Heart sounds: No murmur  Comments:   Left forearm arterial venous graft for hemodialysis with positive bruit and thrill  Surgical incision just below left antecubital region is well healed  No sign of infection  Pulmonary:      Effort: Pulmonary effort is normal  No respiratory distress  Breath sounds: Normal breath sounds  No wheezing or rales  Abdominal:      General: Abdomen is flat  Bowel sounds are normal       Palpations: Abdomen is soft  Tenderness: There is no abdominal tenderness  Musculoskeletal:      Right lower leg: No edema  Left lower leg: No edema  Comments:   Grafton neck finger deformities   Skin:     General: Skin is warm and dry  Findings: No rash  Comments: Right upper chest wall with hemodialysis catheter  Dressing is dry and intact  Neurological:      Mental Status: He is alert and oriented to person, place, and time        Gait: Gait normal    Psychiatric:         Mood and Affect: Mood normal             ALLERGIES:  No Known Allergies    Current Medications     Current Outpatient Medications   Medication Sig Dispense Refill    acetaminophen (TYLENOL) 325 mg tablet Take 1-2 tabs q6h PRN mild fever/pain 90 tablet 0    b complex-vitamin C-folic acid (NEPHROCAPS) 1 mg capsule Take 1 capsule by mouth daily with dinner 30 capsule 0    bumetanide (BUMEX) 2 mg tablet TAKE 2 TABLETS (4 MG TOTAL) BY MOUTH 2 (TWO) TIMES A DAY 60 tablet 3    diphenhydrAMINE (BENADRYL) 25 mg tablet Take 25 mg by mouth every 6 (six) hours as needed for itching      docusate sodium (COLACE) 100 mg capsule TAKE 1 CAPSULE (100 MG TOTAL) BY MOUTH 2 (TWO) TIMES A DAY 30 capsule 3    potassium chloride (K-DUR,KLOR-CON) 20 mEq tablet TAKE 1 TABLET (20 MEQ TOTAL) BY MOUTH 2 (TWO) TIMES A DAY 60 tablet 5    warfarin (COUMADIN) 2 mg tablet Anticoagulation needed for afib 30 tablet 0    atorvastatin (LIPITOR) 40 mg tablet TAKE 1 TABLET (40 MG TOTAL) BY MOUTH DAILY 90 tablet 0     No current facility-administered medications for this visit            Health Maintenance     Health Maintenance   Topic Date Due    COVID-19 Vaccine (2 - Pfizer 2-dose series) 04/21/2021    Fall Risk  01/07/2022    Depression Screening PHQ  01/07/2022    Medicare Annual Wellness Visit (AWV)  01/07/2022    BMI: Followup Plan  01/17/2022    BMI: Adult  04/15/2022    DTaP,Tdap,and Td Vaccines (2 - Td) 05/01/2029    Hepatitis C Screening  Completed    Pneumococcal Vaccine: 65+ Years  Completed    Influenza Vaccine  Completed    HIB Vaccine  Aged Out    Hepatitis B Vaccine  Aged Out    IPV Vaccine  Aged Out    Hepatitis A Vaccine  Aged Out    Meningococcal ACWY Vaccine  Aged Out    HPV Vaccine  Aged Out     Immunization History   Administered Date(s) Administered    INFLUENZA 10/16/2014, 09/02/2015, 08/15/2016, 09/15/2018    Influenza Quadrivalent Preservative Free 3 years and older IM 10/16/2014    Influenza Split High Dose Preservative Free IM 01/20/2015    Influenza, high dose seasonal 0 7 mL 10/28/2020    Pneumococcal Conjugate 13-Valent 04/17/2019    Pneumococcal Polysaccharide PPV23 01/07/2021    SARS-CoV-2 / COVID-19 mRNA IM (Pfizer-BioNTech) 03/31/2021    Tdap 05/01/2019    Tuberculin Skin Test-PPD Intradermal 01/09/2020, 02/22/2021    Zoster 01/20/2015    Zoster Vaccine Recombinant 09/15/2018, 04/17/2019    influenza, trivalent, adjuvanted 08/28/2019       EL Patel

## 2021-04-14 DIAGNOSIS — N17.9 ACUTE KIDNEY INJURY SUPERIMPOSED ON CHRONIC KIDNEY DISEASE (HCC): ICD-10-CM

## 2021-04-14 DIAGNOSIS — I71.01 DISSECTION OF THORACIC AORTA (HCC): ICD-10-CM

## 2021-04-14 DIAGNOSIS — Z98.890 S/P AORTA REPAIR: ICD-10-CM

## 2021-04-14 DIAGNOSIS — N18.9 ACUTE KIDNEY INJURY SUPERIMPOSED ON CHRONIC KIDNEY DISEASE (HCC): ICD-10-CM

## 2021-04-14 DIAGNOSIS — N17.9 AKI (ACUTE KIDNEY INJURY) (HCC): ICD-10-CM

## 2021-04-14 RX ORDER — ATORVASTATIN CALCIUM 40 MG/1
40 TABLET, FILM COATED ORAL DAILY
Qty: 90 TABLET | Refills: 0 | Status: SHIPPED | OUTPATIENT
Start: 2021-04-14 | End: 2021-06-01

## 2021-04-15 ENCOUNTER — OFFICE VISIT (OUTPATIENT)
Dept: VASCULAR SURGERY | Facility: CLINIC | Age: 81
End: 2021-04-15

## 2021-04-15 ENCOUNTER — TELEPHONE (OUTPATIENT)
Dept: FAMILY MEDICINE CLINIC | Facility: CLINIC | Age: 81
End: 2021-04-15

## 2021-04-15 VITALS
DIASTOLIC BLOOD PRESSURE: 62 MMHG | WEIGHT: 163 LBS | SYSTOLIC BLOOD PRESSURE: 118 MMHG | HEART RATE: 83 BPM | RESPIRATION RATE: 16 BRPM | HEIGHT: 67 IN | BODY MASS INDEX: 25.58 KG/M2

## 2021-04-15 DIAGNOSIS — N18.6 ESRD (END STAGE RENAL DISEASE) (HCC): Primary | ICD-10-CM

## 2021-04-15 PROCEDURE — 99024 POSTOP FOLLOW-UP VISIT: CPT | Performed by: SURGERY

## 2021-04-15 NOTE — ASSESSMENT & PLAN NOTE
Stable  Regular rate and rhythm today  Anticoagulated on coumadin  Following with cardiology, Dr Smooth Babb

## 2021-04-15 NOTE — TELEPHONE ENCOUNTER
Please ask patient to complete lipid panel to check cholesterol with next blood work  Please mail slip

## 2021-04-15 NOTE — ASSESSMENT & PLAN NOTE
Lab Results   Component Value Date    EGFR 23 03/20/2021    EGFR 17 02/24/2021    EGFR 16 02/16/2021    CREATININE 2 57 (H) 03/20/2021    CREATININE 3 35 (H) 02/24/2021    CREATININE 3 45 (H) 02/16/2021     Receiving Hemodialysis  Right upper chest wall dialysis catheter in place  S/P placement of left forearm arteriovenous graft

## 2021-04-15 NOTE — ASSESSMENT & PLAN NOTE
Wt Readings from Last 3 Encounters:   04/15/21 73 9 kg (163 lb)   04/13/21 74 6 kg (164 lb 6 4 oz)   04/08/21 73 9 kg (163 lb)        Appears euvolemic today  Continue Bumex  Continue follow-up with Cardiology, Dr Maryam Cevallos

## 2021-04-15 NOTE — PROGRESS NOTES
Assessment/Plan:    ESRD (end stage renal disease) ([facility] )   Chronic renal failure now status post placement of a left forearm loop arteriovenous graft  This is functioning well and is now available for use  Suggest transitioning from his right IJ PermCath to the left AV graft as tolerated  Diagnoses and all orders for this visit:    ESRD (end stage renal disease) ([facility] )          Subjective:      Patient ID: Navneet Atwood is a 80 y o  male  Patient is s/p Lt forearm loop AVG on 3/26/21 by Dr Edith Mcclure  Pt has numbness in the Lt hand when he elevates it  Pt denies loss of motion, pain, fevers, or chills  Pt goes to [address] [place] [address] on M-W-F where they use Rt chest PermCath  There is a thrill and bruit  Pt is on Atorvastatin and Warfarin  80-year-old dialysis patient dialyzed via right IJ PermCath had placement of a left forearm loop graft on 03/26/2021  He was re-evaluated on 03/29/2021 with complaints of swelling and discomfort at the surgical site  On re-evaluation today he notes complete resolution of discomfort and swelling  He notes no numbness or weakness of the hand  He notes some chronic numbness of his fingers with positioning which was present prior to graft placement  On examination all incisions are well healed  There is no significant swelling  There is a thrill and bruit throughout the fistula  The hand is pink and warm with normal motor and sensory function  There is an easily palpable radial pulse  The following portions of the patient's history were reviewed and updated as appropriate: allergies, current medications, past family history, past medical history, past social history, past surgical history and problem list     I have reviewed and made appropriate changes to the review of systems input by the medical assistant      Vitals:    04/15/21 1000   BP: 118/62   BP Location: Right arm   Patient Position: Sitting   Pulse: 83   Resp: 16   Weight: 73 9 kg (163 lb)   Height: 5' 7" (1 702 m)       Patient Active Problem List   Diagnosis    Rotator cuff tear arthropathy, right    Secondary osteoarthritis of right shoulder    Benign essential hypertension    Abnormal TSH    Overweight (BMI 25 0-29  9)    Lumbar pain    Benign prostatic hyperplasia without lower urinary tract symptoms    Bradycardia    Dissection of thoracic aorta (HCC)    S/P aorta repair    Anticoagulation goal of INR 2 to 3    Hyperphosphatemia    Hypotension    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    Complication of vascular dialysis catheter    Hyperlipidemia    Sleep disorder    Encounter for post surgical wound check    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    Monoclonal gammopathy    Paroxysmal atrial fibrillation (HCC)    GONZALES (dyspnea on exertion)    Acute on chronic diastolic (congestive) heart failure (HCC)    Anemia    Chronic kidney disease-mineral and bone disorder    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 ([address] )    Rheumatoid arthritis ([facility] )    Atrial fibrillation ([address] )    Insomnia    ESRD (end stage renal disease) ([facility] )    Status post placement of arteriovenous graft       Past Surgical History:   Procedure Laterality Date    APPENDECTOMY      CARDIAC SURGERY      COLONOSCOPY  2017    FRACTURE SURGERY      IR PERITONEAL DIALYSIS CATHETER PLACEMENT  2/3/2021    IR PERITONEAL DIALYSIS CATHETER REMOVAL  2/17/2021    IR PERITONEAL DIALYSIS CATHETER REMOVAL AND PLACEMENT NEW SITE  2/9/2021    IR TEMPORARY DIALYSIS CATHETER PLACEMENT  12/23/2019    IR THORACENTESIS  12/24/2019    IR THORACENTESIS  12/27/2019    IR TUNNELED DIALYSIS CATHETER CHECK/CHANGE/REPOSITION/ANGIOPLASTY  1/6/2020    IR TUNNELED DIALYSIS CATHETER PLACEMENT  1/2/2020    IR TUNNELED DIALYSIS CATHETER PLACEMENT  2/15/2021    IR TUNNELED DIALYSIS CATHETER REMOVAL  3/4/2020    NH ANASTOMOSIS,AV,ANY SITE Left 3/26/2021    Procedure: LEFT FOREARM LOOP AV GRAFT;  Surgeon: Edgardo Oconnor MD;  [facility];  Service: Vascular    [place] ASCEND AORTA GRAFT W ROOT REPLACMENT  VALVE CONDUIT/CORON RECONSTRUCT N/A 12/15/2019    Procedure: BENTALL PROCEDURE (ASCENDING AORTIC REPAIR) with 26mm Gelweave Graft;  Surgeon: Amirah Broderick DO;  Location: [facility];  Service: Cardiac Surgery    [place] RECONSTR TOTAL SHOULDER IMPLANT Right 2017    Procedure: ARTHROPLASTY SHOULDER REVERSE with OPEN CYST EXCISION;  Surgeon: Yulissa Gomes MD;  Location: [facility];  Service: Orthopedics    SHOULDER SURGERY      WRIST SURGERY         Family History   Problem Relation Age of Onset    No Known Problems Mother     Hypertension Father     Arthritis Family     No Known Problems Daughter        Social History     Socioeconomic History    Marital status: /Civil Union     Spouse name: Not on file    Number of children: Not on file    Years of education: Not on file    Highest education level: Not on file   Occupational History    Not on file   Social Needs    Financial resource strain: Not hard at all   [employer] insecurity     Worry: Never true     Inability: Never true   [employer] needs     Medical: No     Non-medical: No   Tobacco Use    Smoking status: Former Smoker     Packs/day:  00     Quit date:      Years since quittin 3    Smokeless tobacco: Never Used   Substance and Sexual Activity    Alcohol use: Not Currently     Frequency: Never     Drinks per session: Patient refused     Binge frequency: Never    Drug use: Not Currently    Sexual activity: Not Currently     Partners: Female   Lifestyle    Physical activity     Days per week: Not on file     Minutes per session: Not on file    Stress: Not on file   Relationships    Social connections     Talks on phone: Not on file     Gets together: Not on file     Attends Buddhist service: Not on file     Active member of club or organization: Not on file     Attends meetings of clubs or organizations: Not on file     Relationship status: Not on file    Intimate partner violence     Fear of current or ex partner: Not on file     Emotionally abused: Not on file     Physically abused: Not on file     Forced sexual activity: Not on file   Other Topics Concern    Not on file   Social History Narrative    Daily caffeine consumption, 1 serving a day    Drinks coffee        No Known Allergies      Current Outpatient Medications:     acetaminophen (TYLENOL) 325 mg tablet, Take 1-2 tabs q6h PRN mild fever/pain, Disp: 90 tablet, Rfl: 0    atorvastatin (LIPITOR) 40 mg tablet, TAKE 1 TABLET (40 MG TOTAL) BY MOUTH DAILY, Disp: 90 tablet, Rfl: 0    b complex-vitamin C-folic acid (NEPHROCAPS) 1 mg capsule, Take 1 capsule by mouth daily with dinner, Disp: 30 capsule, Rfl: 0    bumetanide (BUMEX) 2 mg tablet, TAKE 2 TABLETS (4 MG TOTAL) BY MOUTH 2 (TWO) TIMES A DAY, Disp: 60 tablet, Rfl: 3    diphenhydrAMINE (BENADRYL) 25 mg tablet, Take 25 mg by mouth every 6 (six) hours as needed for itching, Disp: , Rfl:     docusate sodium (COLACE) 100 mg capsule, TAKE 1 CAPSULE (100 MG TOTAL) BY MOUTH 2 (TWO) TIMES A DAY, Disp: 30 capsule, Rfl: 3    potassium chloride (K-DUR,KLOR-CON) 20 mEq tablet, TAKE 1 TABLET (20 MEQ TOTAL) BY MOUTH 2 (TWO) TIMES A DAY, Disp: 60 tablet, Rfl: 5    warfarin (COUMADIN) 2 mg tablet, Anticoagulation needed for afib, Disp: 30 tablet, Rfl: 0    Review of Systems   Constitutional: Negative  HENT: Negative  Eyes: Negative  Respiratory: Negative  Cardiovascular: Negative  Gastrointestinal: Negative  Endocrine: Negative  Genitourinary: Negative  Musculoskeletal: Negative  Skin: Negative  Allergic/Immunologic: Negative  Neurological: Positive for numbness (Lt hand with elevation)  Hematological: Negative  Psychiatric/Behavioral: Negative            Objective:      /62 (BP Location: Right arm, Patient Position: Sitting)   Pulse 83   Resp 16   Ht 5' 7" (1 702 m)   Wt 73 9 kg (163 lb)   BMI 25 53 kg/m²          Physical Exam

## 2021-04-15 NOTE — TELEPHONE ENCOUNTER
Spoke with pt's magali  Made aware as per provider's orders  She verbalized understanding  Lab slip mailed to pt's home

## 2021-04-15 NOTE — PATIENT INSTRUCTIONS
ESRD (end stage renal disease) ([facility] )   Chronic renal failure now status post placement of a left forearm loop arteriovenous graft  This is functioning well and is now available for use  Suggest transitioning from his right IJ PermCath to the left AV graft as tolerated

## 2021-04-15 NOTE — ASSESSMENT & PLAN NOTE
Last lipid panel completed in January with total cholesterol 88, triglycerides 69, HDL 42, LDL 32  Will repeat lipid panel  May consider reduction in statin dose if numbers remain stable

## 2021-04-15 NOTE — LETTER
April 15, 2021     EL Cheney  [address] [facility] [address]    Patient: Michael Sharpe   YOB: 1940   Date of Visit: 4/15/2021       Dear Dr Danielle Moreno: Thank you for referring [facility] to me for evaluation  Below are the relevant portions of my assessment and plan of care  ESRD (end stage renal disease) ([facility] )   Chronic renal failure now status post placement of a left forearm loop arteriovenous graft  This is functioning well and is now available for use  Suggest transitioning from his right IJ PermCath to the left AV graft as tolerated  If you have questions, please do not hesitate to call me  I look forward to following [facility] along with you           Sincerely,        Sima Rodriguez MD        CC: Tena Ngo MD  [address]

## 2021-04-15 NOTE — ASSESSMENT & PLAN NOTE
Chronic renal failure now status post placement of a left forearm loop arteriovenous graft  This is functioning well and is now available for use  Suggest transitioning from his right IJ PermCath to the left AV graft as tolerated

## 2021-04-23 ENCOUNTER — TRANSCRIBE ORDERS (OUTPATIENT)
Dept: RADIOLOGY | Facility: HOSPITAL | Age: 81
End: 2021-04-23

## 2021-04-23 ENCOUNTER — PREP FOR PROCEDURE (OUTPATIENT)
Dept: INTERVENTIONAL RADIOLOGY/VASCULAR | Facility: CLINIC | Age: 81
End: 2021-04-23

## 2021-04-23 DIAGNOSIS — E83.9 CHRONIC KIDNEY DISEASE-MINERAL AND BONE DISORDER: Primary | ICD-10-CM

## 2021-04-23 DIAGNOSIS — N18.9 CHRONIC KIDNEY DISEASE-MINERAL AND BONE DISORDER: Primary | ICD-10-CM

## 2021-04-23 DIAGNOSIS — M89.9 CHRONIC KIDNEY DISEASE-MINERAL AND BONE DISORDER: Primary | ICD-10-CM

## 2021-04-23 DIAGNOSIS — N18.6 ESRD (END STAGE RENAL DISEASE) (HCC): Primary | ICD-10-CM

## 2021-04-27 ENCOUNTER — HOSPITAL ENCOUNTER (OUTPATIENT)
Dept: RADIOLOGY | Facility: HOSPITAL | Age: 81
Discharge: HOME | End: 2021-04-27
Attending: STUDENT IN AN ORGANIZED HEALTH CARE EDUCATION/TRAINING PROGRAM | Admitting: RADIOLOGY
Payer: COMMERCIAL

## 2021-04-27 DIAGNOSIS — N18.9 CHRONIC KIDNEY DISEASE-MINERAL AND BONE DISORDER: ICD-10-CM

## 2021-04-27 DIAGNOSIS — E83.9 CHRONIC KIDNEY DISEASE-MINERAL AND BONE DISORDER: ICD-10-CM

## 2021-04-27 DIAGNOSIS — M89.9 CHRONIC KIDNEY DISEASE-MINERAL AND BONE DISORDER: ICD-10-CM

## 2021-04-27 PROCEDURE — 36589 REMOVAL TUNNELED CV CATH: CPT

## 2021-04-27 PROCEDURE — 36589 REMOVAL TUNNELED CV CATH: CPT | Performed by: NURSE PRACTITIONER

## 2021-04-27 NOTE — BRIEF OP NOTE (RAD/CATH)
IR TUNNELED CENTRAL LINE REMOVAL Procedure Note    PATIENT NAME: Graciela SEBASTIAN Core  : 1940  MRN: [id number]    Pre-op Diagnosis:   1  Chronic kidney disease-mineral and bone disorder      Post-op Diagnosis:   1   Chronic kidney disease-mineral and bone disorder      Provider:    Dyana Barthel Dr Madie Lout    Assistants:     No qualified resident was available, Resident is only observing    Estimated Blood Loss: none  Findings: right IJ permacath removal     Specimens: none    Complications:  none    Anesthesia: local    Dyana Barthel     Date: 2021  Time: 12:47 PM

## 2021-04-27 NOTE — DISCHARGE INSTRUCTIONS
Perma-cath Removal   WHAT YOU NEED TO KNOW:   A perma-cath is a catheter placed through a vein into or near your right atrium  Your right atrium is the right upper chamber of your heart  A perma-cath is used for dialysis in an emergency or until a long-term device is ready to use  Or you no longer need dialysis  DISCHARGE INSTRUCTIONS:   Call 911 for any of the following:   · You feel lightheaded, short of breath, and have chest pain  · You start bleeding    Contact Interventional Radiology at [phone] Slava PATIENTS: Contact Interventional Radiology at [phone]) Arti Sanchezs PATIENTS: Contact Interventional Radiology at [phone]) if:  · Blood soaks through your bandage  · You have new swelling in your arm, neck, face, or chest on your right side  · Your bruises or pain get worse  · You have a fever or chills  · Persistent nausea or vomiting  · Your incision is red, swollen, or draining pus  · You have questions or concerns about your condition or care  Self-care:   · Resume your normal diet  Small sips of flat soda will help with nausea  · Keep your dressings dry  Do not get your perma-cath site wet until the incision closes  You may take a tub bath, but do not get your dressings wet  Water in your wound can cause bacteria to grow and cause an infection  If your dressing gets wet, remove the wet dressing and apply a dry bandaid  Keep it covered until the incision closes  This should only take a few days to heal  Do not use soaps or ointments  · Immediately after your catheter is removed, no strenuous activity for twenty four hours and stay in an upright sitting position for two hours     Follow up with your healthcare provider as directed: Write down your questions so you remember to ask them during your visits

## 2021-04-29 ENCOUNTER — OFFICE VISIT (OUTPATIENT)
Dept: CARDIOLOGY CLINIC | Facility: CLINIC | Age: 81
End: 2021-04-29
Payer: COMMERCIAL

## 2021-04-29 VITALS
HEART RATE: 79 BPM | DIASTOLIC BLOOD PRESSURE: 60 MMHG | HEIGHT: 67 IN | WEIGHT: 168 LBS | SYSTOLIC BLOOD PRESSURE: 92 MMHG | BODY MASS INDEX: 26.37 KG/M2

## 2021-04-29 DIAGNOSIS — I50.33 ACUTE ON CHRONIC DIASTOLIC (CONGESTIVE) HEART FAILURE (HCC): Primary | ICD-10-CM

## 2021-04-29 DIAGNOSIS — I71.01 DISSECTION OF THORACIC AORTA (HCC): ICD-10-CM

## 2021-04-29 DIAGNOSIS — N17.9 ACUTE KIDNEY INJURY SUPERIMPOSED ON CHRONIC KIDNEY DISEASE (HCC): ICD-10-CM

## 2021-04-29 DIAGNOSIS — N17.9 AKI (ACUTE KIDNEY INJURY) (HCC): ICD-10-CM

## 2021-04-29 DIAGNOSIS — N18.9 ACUTE KIDNEY INJURY SUPERIMPOSED ON CHRONIC KIDNEY DISEASE (HCC): ICD-10-CM

## 2021-04-29 DIAGNOSIS — N18.6 ESRD (END STAGE RENAL DISEASE) (HCC): ICD-10-CM

## 2021-04-29 DIAGNOSIS — I10 BENIGN ESSENTIAL HYPERTENSION: Chronic | ICD-10-CM

## 2021-04-29 DIAGNOSIS — I48.0 PAROXYSMAL ATRIAL FIBRILLATION (HCC): ICD-10-CM

## 2021-04-29 DIAGNOSIS — Z98.890 S/P AORTA REPAIR: ICD-10-CM

## 2021-04-29 PROBLEM — I48.91 ATRIAL FIBRILLATION (HCC): Status: RESOLVED | Noted: 2021-01-26 | Resolved: 2021-04-29

## 2021-04-29 PROCEDURE — 1036F TOBACCO NON-USER: CPT | Performed by: INTERNAL MEDICINE

## 2021-04-29 PROCEDURE — 93000 ELECTROCARDIOGRAM COMPLETE: CPT | Performed by: INTERNAL MEDICINE

## 2021-04-29 PROCEDURE — 1160F RVW MEDS BY RX/DR IN RCRD: CPT | Performed by: INTERNAL MEDICINE

## 2021-04-29 PROCEDURE — 99214 OFFICE O/P EST MOD 30 MIN: CPT | Performed by: INTERNAL MEDICINE

## 2021-04-29 RX ORDER — WARFARIN SODIUM 2.5 MG/1
TABLET ORAL
Qty: 60 TABLET | Refills: 5 | Status: SHIPPED | OUTPATIENT
Start: 2021-04-29 | End: 2021-01-01

## 2021-04-29 NOTE — PROGRESS NOTES
Cardiology Follow Up    [facility]  1940  [address] [facility] [place]   [phone]    1  Acute on chronic diastolic (congestive) heart failure (HCC)     2  Paroxysmal atrial fibrillation (HCC)  POCT ECG   3  Dissection of thoracic aorta (HCC)  warfarin (COUMADIN) 2 5 mg tablet   4  Benign essential hypertension     5  ESRD (end stage renal disease) ([address] )     6  S/P aorta repair  warfarin (COUMADIN) 2 5 mg tablet   7  Acute kidney injury superimposed on chronic kidney disease (HCC)  warfarin (COUMADIN) 2 5 mg tablet   8  AFSHIN (acute kidney injury) ([facility] [address] )  warfarin (COUMADIN) 2 5 mg tablet       Discussion/Summary:      Paroxysmal atrial fibrillation  Thankfully, he is maintaining sinus rhythm although his beta-blocker had to be stopped because of bradycardia  Previously a QT prolongation on amiodarone  He is on anticoagulation with warfarin, managed through [facility]  Diastolic congestive heart failure  Volume management will be by hemodialysis  Appears stable  Type a dissection status post surgical repair back in December of 2019  This is stable  Previous History:  82-year-old man  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, 2019  Postoperatively, acute kidney injury on chronic kidney disease requiring dialysis  Had some renal recovery, but now is back on HD  Postoperatively with paroxysmal atrial fibrillation  He had QT prolongation with amiodarone  He was in and out of atrial fibrillation, but was asymptomatic when he had it  He has been on warfarin for anticoagulation  He also had left pleural effusions which required 2 thoracenteses during hospitalization    LV function is preserved  Interval history:    Most recent hospitalization for heart failure was in January of 2021    Unfortunately, at that time he started on dialysis and has been on 3 times a week hemodialysis since then  He has a left upper extremity fistula which is now maturing they are using for dialysis access  He finds it uncomfortable and painful, but his catheter was removed to minimize infection risk  Volume status has remained stable with dialysis  He complains of arthritis in multiple joints and also some leg cramps mostly when he is on dialysis, as opposed to claudication like symptoms when he walks  Patient is daughter asking me if you be safe to travel to [place]  They have already made arrangements with the nephrologist for him to be able to get dialysis done there  Asking if he is safe from a heart standpoint  He denies any palpitations  He remains in sinus rhythm  During hospitalization in January, he was bradycardic and his beta-blocker was stopped  Problem List     Rotator cuff tear arthropathy, right (Chronic)    Secondary osteoarthritis of right shoulder    Aftercare following surgery of the musculoskeletal system    Benign essential hypertension (Chronic)    Abnormal TSH    Overweight (BMI 25 0-29  9)    Chronic midline back pain    Benign hypertension with CKD (chronic kidney disease) stage III (HCC) (Chronic)    Benign prostatic hyperplasia without lower urinary tract symptoms    Bradycardia    SOBOE (shortness of breath on exertion)    Dissection of thoracic aorta ([facility])    S/P aorta repair    Postoperative anemia due to acute blood loss    Anticoagulation goal of INR 2 to 3    AFSHIN (acute kidney injury) ([address] )    Hyperphosphatemia    Hypotension    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    Complication of vascular dialysis catheter    Pneumonia    Hyperlipidemia    Muscle spasm    Sleep disorder        Past Medical History:   Diagnosis Date    Acute renal failure superimposed on stage 4 chronic kidney disease ([address] ) 10/4/2020    Arthritis     BPH without urinary obstruction     CKD (chronic kidney disease), stage III ([facility] [address] )     baseline 1 6-1 7    GERD (gastroesophageal reflux disease)     Hyperlipidemia     Hypertension     MI (myocardial infarction) ([facility] [address] )      Social History     Tobacco Use    Smoking status: Former Smoker     Packs/day: 1 00     Quit date:      Years since quittin 3    Smokeless tobacco: Never Used   Substance Use Topics    Alcohol use: Not Currently     Frequency: Never     Drinks per session: Patient refused     Binge frequency: Never    Drug use: Not Currently      Family History   Problem Relation Age of Onset    No Known Problems Mother     Hypertension Father     Arthritis Family     No Known Problems Daughter      Past Surgical History:   Procedure Laterality Date    APPENDECTOMY      CARDIAC SURGERY      COLONOSCOPY  2017    FRACTURE SURGERY      IR PERITONEAL DIALYSIS CATHETER PLACEMENT  2/3/2021    IR PERITONEAL DIALYSIS CATHETER REMOVAL  2021    IR PERITONEAL DIALYSIS CATHETER REMOVAL AND PLACEMENT NEW SITE  2021    IR TEMPORARY DIALYSIS CATHETER PLACEMENT  2019    IR THORACENTESIS  2019    IR THORACENTESIS  2019    IR TUNNELED CENTRAL LINE REMOVAL  2021    IR TUNNELED DIALYSIS CATHETER CHECK/CHANGE/REPOSITION/ANGIOPLASTY  2020    IR TUNNELED DIALYSIS CATHETER PLACEMENT  2020    IR TUNNELED DIALYSIS CATHETER PLACEMENT  2/15/2021    IR TUNNELED DIALYSIS CATHETER REMOVAL  3/4/2020    CA ANASTOMOSIS,AV,ANY SITE Left 3/26/2021    Procedure: LEFT FOREARM LOOP AV GRAFT;  Surgeon: Edgardo Oconnor MD;  [facility]: [facility];  Service: Vascular    CA ASCEND AORTA GRAFT W ROOT REPLACMENT  VALVE CONDUIT/CORON RECONSTRUCT N/A 12/15/2019    Procedure: BENTALL PROCEDURE (ASCENDING AORTIC REPAIR) with 26mm Gelweave Graft;  Surgeon: Amirah Broderick DO;  [facility];  Service: Cardiac Surgery    CA RECONSTR TOTAL SHOULDER IMPLANT Right 2017    Procedure: ARTHROPLASTY SHOULDER REVERSE with OPEN CYST EXCISION; Surgeon: Tena Ivy MD;  [facility];  Service: Orthopedics    SHOULDER SURGERY      WRIST SURGERY         Current Outpatient Medications:     atorvastatin (LIPITOR) 40 mg tablet, TAKE 1 TABLET (40 MG TOTAL) BY MOUTH DAILY, Disp: 90 tablet, Rfl: 0    b complex-vitamin C-folic acid (NEPHROCAPS) 1 mg capsule, Take 1 capsule by mouth daily with dinner, Disp: 30 capsule, Rfl: 0    bumetanide (BUMEX) 2 mg tablet, TAKE 2 TABLETS (4 MG TOTAL) BY MOUTH 2 (TWO) TIMES A DAY, Disp: 60 tablet, Rfl: 3    docusate sodium (COLACE) 100 mg capsule, TAKE 1 CAPSULE (100 MG TOTAL) BY MOUTH 2 (TWO) TIMES A DAY, Disp: 30 capsule, Rfl: 3    potassium chloride (K-DUR,KLOR-CON) 20 mEq tablet, TAKE 1 TABLET (20 MEQ TOTAL) BY MOUTH 2 (TWO) TIMES A DAY, Disp: 60 tablet, Rfl: 5    warfarin (COUMADIN) 2 5 mg tablet, Take as directed per coumadin clinic, Disp: 60 tablet, Rfl: 5    acetaminophen (TYLENOL) 325 mg tablet, Take 1-2 tabs q6h PRN mild fever/pain (Patient not taking: Reported on 4/29/2021), Disp: 90 tablet, Rfl: 0    diphenhydrAMINE (BENADRYL) 25 mg tablet, Take 25 mg by mouth every 6 (six) hours as needed for itching, Disp: , Rfl:   No Known Allergies    Vitals:    04/29/21 1251   BP: 92/60   BP Location: Right arm   Patient Position: Sitting   Cuff Size: Adult   Pulse: 79   Weight: 76 2 kg (168 lb)   Height: 5' 7" (1 702 m)     Vitals:    04/29/21 1251   Weight: 76 2 kg (168 lb)      Height: 5' 7" (170 2 cm)   Body mass index is 26 31 kg/m²  Physical Exam:  GEN: Oliver SEBASTIAN Core appears well, alert and oriented x 3, pleasant and cooperative   HEENT: pupils equal, round, and reactive to light; extraocular muscles intact  NECK: supple, no carotid bruits   HEART: regular rhythm, 2/6 HSM   LUNGS: no wheezes, rales mid fields    ABDOMEN: normal bowel sounds, soft, no tenderness, no distention  EXTREMITIES: LUE fistula  NEURO: no focal findings   SKIN: scattered ecchymoses    ROS:  Positive for back pain, fatigue, arthritis, leg cramps  Except as noted in HPI, is otherwise reviewed in detail and a 12 point review of systems is negative  ROS reviewed and is unchanged    Labs:  Lab Results   Component Value Date    K 4 8 03/26/2021     03/20/2021    CREATININE 2 57 (H) 03/20/2021    BUN 19 03/20/2021    CO2 34 (H) 03/20/2021    ALT 15 02/05/2021    AST 26 02/05/2021    INR 2 37 (H) 04/13/2021    GLUF 93 03/20/2021    HGBA1C 5 3 11/17/2017    WBC 6 19 03/20/2021    HGB 11 1 (L) 03/20/2021    HCT 35 3 (L) 03/20/2021     (L) 03/20/2021       No results found for: CHOL  Lab Results   Component Value Date    LDLCALC 32 01/26/2021    LDLCALC 59 05/08/2020    LDLCALC 115 (H) 03/08/2018     Lab Results   Component Value Date    HDL 42 01/26/2021    HDL 32 (L) 05/08/2020    HDL 43 03/08/2018     Lab Results   Component Value Date    TRIG 69 01/26/2021    TRIG 85 05/08/2020    TRIG 82 03/08/2018     Testing:  Echo 10/6/20:  LEFT VENTRICLE:  Systolic function was normal by visual assessment  Ejection fraction was estimated to be 60 %  There were no regional wall motion abnormalities  Wall thickness was moderately increased  Features were consistent with a pseudonormal left ventricular filling pattern, with concomitant abnormal relaxation and increased filling pressure (grade 2 diastolic dysfunction)      RIGHT VENTRICLE:  Systolic pressure was mildly to moderately increased  Estimated peak pressure was 47 mmHg      LEFT ATRIUM:  The atrium was mildly dilated      RIGHT ATRIUM:  The atrium was moderately dilated      MITRAL VALVE:  There was mild annular calcification  There was mild regurgitation      AORTIC VALVE:  Transaortic velocity was increased due to valvular stenosis  There was mild stenosis  There was mild regurgitation      TRICUSPID VALVE:  There was severe regurgitation      PULMONIC VALVE:  There was mild regurgitation      IVC, HEPATIC VEINS:  The inferior vena cava was dilated      Echo:  LEFT VENTRICLE:  Systolic Function: normal  Ejection Fraction: 65%  Cavity size: normal    RIGHT VENTRICLE:  Systolic Function: normal   Cavity size moderately dilated  Hypertrophy (severe LVH) present  AORTIC VALVE:  Leaflets: normal and trileaflet  Leaflet motions normal and normal  Stenosis: none  Regurgitation: trace  MITRAL VALVE:  Leaflets: calcified and normal  Leaflet Motions: normal  Regurgitation: none  Stenosis: none  TRICUSPID VALVE:  Leaflets: normal, annulus severely dilated and dilate annulus  Leaflet Motions: restricted  Stenosis: none  Regurgitation: moderate  PULMONIC VALVE:  Leaflets: normal  Regurgitation: none  Stenosis: none  ASCENDING AORTA:  Size:  normal  Dissection present  AORTIC ARCH:  Size:  normal    OTHER AORTIC FINDINGS:   Hematoma noted that extends from distal arch to mid descending aorta, 0 7 cm in thickness  RIGHT ATRIUM:  Size:  dilated  No spontaneous echo contrast   LEFT ATRIUM:  Size: normal    LEFT ATRIAL APPENDAGE:  Size: normal    ATRIAL SEPTUM:  Intra-atrial septal morphology: normal    VENTRICULAR SEPTUM:  Intra-ventricular septum morphology: normal    OTHER FINDINGS:  Pericardium:  normal  Pleural Effusion:  none  POSTPROCEDURE  LEFT VENTRICLE: Unchanged   RIGHT VENTRICLE:   Systolic Function: mildly depressed  AORTIC VALVE:   Leaflets: native  Stenosis: none  Regurgitation: mild  MITRAL VALVE: Unchanged   TRICUSPID VALVE:   Leaflets: native  Regurgitation: systolic flow reversal in hepatic vein and severe  PULMONIC VALVE: Unchanged   ATRIA: Unchanged   AORTA:   OTHER AORTA FINDINGS: The proximal aorta has been replaced  The mural hematoma on the descending aorta is preserved  Burton Simple REMOVAL:  Probe Removal: atraumatic  ECHOCARDIOGRAM COMMENTS:  Noted is the absence of right coronary ostia  The left coronary ostia appears to have two vessels arising from it  Confirmed surgically    EKG:   sinus rhythm 79 beats per minute  First-degree AV block

## 2021-04-30 ENCOUNTER — ANTICOAG VISIT (OUTPATIENT)
Dept: CARDIOLOGY CLINIC | Facility: CLINIC | Age: 81
End: 2021-04-30

## 2021-04-30 ENCOUNTER — APPOINTMENT (OUTPATIENT)
Dept: LAB | Facility: HOSPITAL | Age: 81
End: 2021-04-30
Attending: INTERNAL MEDICINE
Payer: COMMERCIAL

## 2021-04-30 DIAGNOSIS — M89.9 CHRONIC KIDNEY DISEASE-MINERAL AND BONE DISORDER: ICD-10-CM

## 2021-04-30 DIAGNOSIS — E78.2 MIXED HYPERLIPIDEMIA: ICD-10-CM

## 2021-04-30 DIAGNOSIS — E83.9 CHRONIC KIDNEY DISEASE-MINERAL AND BONE DISORDER: ICD-10-CM

## 2021-04-30 DIAGNOSIS — N18.9 CHRONIC KIDNEY DISEASE-MINERAL AND BONE DISORDER: ICD-10-CM

## 2021-04-30 LAB
CHOLEST SERPL-MCNC: 103 MG/DL (ref 50–200)
HDLC SERPL-MCNC: 37 MG/DL
LDLC SERPL CALC-MCNC: 48 MG/DL (ref 0–100)
NONHDLC SERPL-MCNC: 66 MG/DL
TRIGL SERPL-MCNC: 90 MG/DL

## 2021-04-30 PROCEDURE — 80061 LIPID PANEL: CPT

## 2021-05-03 ENCOUNTER — TELEPHONE (OUTPATIENT)
Dept: FAMILY MEDICINE CLINIC | Facility: CLINIC | Age: 81
End: 2021-05-03

## 2021-05-03 DIAGNOSIS — I50.32 CHRONIC HEART FAILURE WITH PRESERVED EJECTION FRACTION (HCC): ICD-10-CM

## 2021-05-03 DIAGNOSIS — I50.20 SYSTOLIC CONGESTIVE HEART FAILURE, UNSPECIFIED HF CHRONICITY (HCC): ICD-10-CM

## 2021-05-03 RX ORDER — BUMETANIDE 2 MG/1
4 TABLET ORAL 2 TIMES DAILY
Qty: 120 TABLET | Refills: 2 | Status: SHIPPED | OUTPATIENT
Start: 2021-05-03 | End: 2021-01-01

## 2021-05-03 RX ORDER — DOCUSATE SODIUM 100 MG/1
100 CAPSULE, LIQUID FILLED ORAL 2 TIMES DAILY
Qty: 30 CAPSULE | Refills: 3 | Status: SHIPPED | OUTPATIENT
Start: 2021-05-03 | End: 2021-07-22

## 2021-05-03 NOTE — TELEPHONE ENCOUNTER
----- Message from [id number] sent at 5/3/2021  2:29 PM EDT -----  Cholesterol is stable

## 2021-05-04 DIAGNOSIS — N18.6 END STAGE RENAL DISEASE (HCC): Primary | ICD-10-CM

## 2021-05-04 RX ORDER — LIDOCAINE AND PRILOCAINE 25; 25 MG/G; MG/G
CREAM TOPICAL AS NEEDED
Qty: 30 G | Refills: 3 | Status: SHIPPED | OUTPATIENT
Start: 2021-05-04 | End: 2021-01-01

## 2021-05-04 NOTE — PROGRESS NOTES
Patient's daughter concerned as patient having painful hematoma over AVG site  He also has pain with cannulation  He is an ESRD on HD MWF patient at [address] pm shift  Will prescribe emla to [facility]  Verified information with patient's granddaughter  Have discussed need for education with patient on preventing bleeding after decannulation  Will have Shweta Mack review access and potential need for IR or vascular follow up if needed  Explained that switching to an HD catheter would not be appropriate at this time  His daughter, Alex Lynn, showed understanding

## 2021-05-31 DIAGNOSIS — I71.01 DISSECTION OF THORACIC AORTA (HCC): ICD-10-CM

## 2021-05-31 DIAGNOSIS — N17.9 AKI (ACUTE KIDNEY INJURY) (HCC): ICD-10-CM

## 2021-05-31 DIAGNOSIS — N18.9 ACUTE KIDNEY INJURY SUPERIMPOSED ON CHRONIC KIDNEY DISEASE (HCC): ICD-10-CM

## 2021-05-31 DIAGNOSIS — N17.9 ACUTE KIDNEY INJURY SUPERIMPOSED ON CHRONIC KIDNEY DISEASE (HCC): ICD-10-CM

## 2021-05-31 DIAGNOSIS — Z98.890 S/P AORTA REPAIR: ICD-10-CM

## 2021-06-01 ENCOUNTER — ANTICOAG VISIT (OUTPATIENT)
Dept: CARDIOLOGY CLINIC | Facility: CLINIC | Age: 81
End: 2021-06-01

## 2021-06-01 ENCOUNTER — APPOINTMENT (OUTPATIENT)
Dept: LAB | Facility: HOSPITAL | Age: 81
End: 2021-06-01
Attending: INTERNAL MEDICINE
Payer: COMMERCIAL

## 2021-06-01 RX ORDER — ATORVASTATIN CALCIUM 40 MG/1
40 TABLET, FILM COATED ORAL DAILY
Qty: 90 TABLET | Refills: 0 | Status: SHIPPED | OUTPATIENT
Start: 2021-06-01 | End: 2021-01-01

## 2021-06-09 ENCOUNTER — HOSPITAL ENCOUNTER (EMERGENCY)
Facility: HOSPITAL | Age: 81
Discharge: HOME | End: 2021-06-09
Attending: EMERGENCY MEDICINE
Payer: COMMERCIAL

## 2021-06-09 ENCOUNTER — APPOINTMENT (EMERGENCY)
Dept: RADIOLOGY | Facility: HOSPITAL | Age: 81
End: 2021-06-09
Payer: COMMERCIAL

## 2021-06-09 VITALS
DIASTOLIC BLOOD PRESSURE: 71 MMHG | HEART RATE: 78 BPM | TEMPERATURE: 97.8 F | SYSTOLIC BLOOD PRESSURE: 119 MMHG | OXYGEN SATURATION: 97 % | RESPIRATION RATE: 18 BRPM

## 2021-06-09 DIAGNOSIS — H11.31 SUBCONJUNCTIVAL HEMORRHAGE OF RIGHT EYE: Primary | ICD-10-CM

## 2021-06-09 PROBLEM — H54.7 VISION LOSS: Status: ACTIVE | Noted: 2021-06-09

## 2021-06-09 LAB
ALBUMIN SERPL BCP-MCNC: 2.7 G/DL (ref 3.5–5)
ALP SERPL-CCNC: 133 U/L (ref 46–116)
ALT SERPL W P-5'-P-CCNC: 25 U/L (ref 12–78)
ANION GAP SERPL CALCULATED.3IONS-SCNC: 5 MMOL/L (ref 4–13)
APTT PPP: 62 SECONDS (ref 23–37)
AST SERPL W P-5'-P-CCNC: 50 U/L (ref 5–45)
BASE EXCESS BLDA CALC-SCNC: 5 MMOL/L (ref -2–3)
BASOPHILS # BLD AUTO: 0.02 THOUSANDS/ΜL (ref 0–0.1)
BASOPHILS NFR BLD AUTO: 0 % (ref 0–1)
BILIRUB SERPL-MCNC: 0.94 MG/DL (ref 0.2–1)
BUN SERPL-MCNC: 40 MG/DL (ref 5–25)
CALCIUM ALBUM COR SERPL-MCNC: 9.8 MG/DL (ref 8.3–10.1)
CALCIUM SERPL-MCNC: 8.8 MG/DL (ref 8.3–10.1)
CHLORIDE SERPL-SCNC: 103 MMOL/L (ref 100–108)
CO2 SERPL-SCNC: 29 MMOL/L (ref 21–32)
CREAT SERPL-MCNC: 5.39 MG/DL (ref 0.6–1.3)
EOSINOPHIL # BLD AUTO: 0.25 THOUSAND/ΜL (ref 0–0.61)
EOSINOPHIL NFR BLD AUTO: 4 % (ref 0–6)
ERYTHROCYTE [DISTWIDTH] IN BLOOD BY AUTOMATED COUNT: 14.5 % (ref 11.6–15.1)
GFR SERPL CREATININE-BSD FRML MDRD: 9 ML/MIN/1.73SQ M
GLUCOSE SERPL-MCNC: 82 MG/DL (ref 65–140)
GLUCOSE SERPL-MCNC: 82 MG/DL (ref 65–140)
HCO3 BLDA-SCNC: 30.4 MMOL/L (ref 24–30)
HCT VFR BLD AUTO: 36.9 % (ref 36.5–49.3)
HCT VFR BLD CALC: 35 % (ref 36.5–49.3)
HGB BLD-MCNC: 12 G/DL (ref 12–17)
HGB BLDA-MCNC: 11.9 G/DL (ref 12–17)
HOLD SPECIMEN: NORMAL
HOLD SPECIMEN: YES
IMM GRANULOCYTES # BLD AUTO: 0.02 THOUSAND/UL (ref 0–0.2)
IMM GRANULOCYTES NFR BLD AUTO: 0 % (ref 0–2)
INR PPP: 3.26 (ref 0.84–1.19)
LYMPHOCYTES # BLD AUTO: 1.54 THOUSANDS/ΜL (ref 0.6–4.47)
LYMPHOCYTES NFR BLD AUTO: 25 % (ref 14–44)
MCH RBC QN AUTO: 32.3 PG (ref 26.8–34.3)
MCHC RBC AUTO-ENTMCNC: 32.5 G/DL (ref 31.4–37.4)
MCV RBC AUTO: 99 FL (ref 82–98)
MONOCYTES # BLD AUTO: 1.17 THOUSAND/ΜL (ref 0.17–1.22)
MONOCYTES NFR BLD AUTO: 19 % (ref 4–12)
NEUTROPHILS # BLD AUTO: 3.11 THOUSANDS/ΜL (ref 1.85–7.62)
NEUTS SEG NFR BLD AUTO: 52 % (ref 43–75)
NRBC BLD AUTO-RTO: 0 /100 WBCS
PCO2 BLD: 32 MMOL/L (ref 21–32)
PCO2 BLD: 45.3 MM HG (ref 42–50)
PH BLD: 7.43 [PH] (ref 7.3–7.4)
PLATELET # BLD AUTO: 123 THOUSANDS/UL (ref 149–390)
PMV BLD AUTO: 9.8 FL (ref 8.9–12.7)
PO2 BLD: 25 MM HG (ref 35–45)
POTASSIUM BLD-SCNC: 5.2 MMOL/L (ref 3.5–5.3)
POTASSIUM SERPL-SCNC: 5.3 MMOL/L (ref 3.5–5.3)
PROT SERPL-MCNC: 7.8 G/DL (ref 6.4–8.2)
PROTHROMBIN TIME: 33 SECONDS (ref 11.6–14.5)
RBC # BLD AUTO: 3.72 MILLION/UL (ref 3.88–5.62)
SAO2 % BLD FROM PO2: 48 % (ref 60–85)
SODIUM BLD-SCNC: 138 MMOL/L (ref 136–145)
SODIUM SERPL-SCNC: 137 MMOL/L (ref 136–145)
SPECIMEN SOURCE: ABNORMAL
WBC # BLD AUTO: 6.11 THOUSAND/UL (ref 4.31–10.16)

## 2021-06-09 PROCEDURE — 36415 COLL VENOUS BLD VENIPUNCTURE: CPT

## 2021-06-09 PROCEDURE — 99283 EMERGENCY DEPT VISIT LOW MDM: CPT | Performed by: EMERGENCY MEDICINE

## 2021-06-09 PROCEDURE — 85014 HEMATOCRIT: CPT

## 2021-06-09 PROCEDURE — 93308 TTE F-UP OR LMTD: CPT | Performed by: EMERGENCY MEDICINE

## 2021-06-09 PROCEDURE — 80053 COMPREHEN METABOLIC PANEL: CPT | Performed by: STUDENT IN AN ORGANIZED HEALTH CARE EDUCATION/TRAINING PROGRAM

## 2021-06-09 PROCEDURE — 85610 PROTHROMBIN TIME: CPT | Performed by: STUDENT IN AN ORGANIZED HEALTH CARE EDUCATION/TRAINING PROGRAM

## 2021-06-09 PROCEDURE — 82803 BLOOD GASES ANY COMBINATION: CPT

## 2021-06-09 PROCEDURE — 85730 THROMBOPLASTIN TIME PARTIAL: CPT | Performed by: STUDENT IN AN ORGANIZED HEALTH CARE EDUCATION/TRAINING PROGRAM

## 2021-06-09 PROCEDURE — 84295 ASSAY OF SERUM SODIUM: CPT

## 2021-06-09 PROCEDURE — 70450 CT HEAD/BRAIN W/O DYE: CPT

## 2021-06-09 PROCEDURE — 70486 CT MAXILLOFACIAL W/O DYE: CPT

## 2021-06-09 PROCEDURE — 84132 ASSAY OF SERUM POTASSIUM: CPT

## 2021-06-09 PROCEDURE — NC001 PR NO CHARGE: Performed by: EMERGENCY MEDICINE

## 2021-06-09 PROCEDURE — 82947 ASSAY GLUCOSE BLOOD QUANT: CPT

## 2021-06-09 PROCEDURE — 76705 ECHO EXAM OF ABDOMEN: CPT | Performed by: EMERGENCY MEDICINE

## 2021-06-09 PROCEDURE — 99284 EMERGENCY DEPT VISIT MOD MDM: CPT

## 2021-06-09 PROCEDURE — 85025 COMPLETE CBC W/AUTO DIFF WBC: CPT | Performed by: STUDENT IN AN ORGANIZED HEALTH CARE EDUCATION/TRAINING PROGRAM

## 2021-06-09 PROCEDURE — 71250 CT THORAX DX C-: CPT

## 2021-06-09 NOTE — ED PROVIDER NOTES
Emergency Department Airway Evaluation and Management Form    History  Obtained from: EMS  Review of patient's allergies indicates no known allergies  Chief Complaint:  Trauma Alert    HPI: Pt is a 80 y o  male presents s/p     Fall  Hit right chest, has pleuritic pain  Since yesterday  Right eye blindness transiently at some point? I have reviewed and agree with the history as documented  Physical Exam    There were no vitals filed for this visit  Supplemental Oxygen:none    GCS: 15    Neuro: Alert and oriented  Psych: not combative, not anxious, cooperative for exam  Neck: In collar, No JVD, No midline tenderness  Cardio:  Normal  Respiratory: Normal  Mouth:  Normal  Pharynx: Normal    Monitor:  NSR      ED Medications    No current facility-administered medications for this encounter  No current outpatient medications on file        Intubation    No intubation required    Final Diagnosis:  Fall  RIGHT subconjunctival hemorrohage with chemrosis of eye    ED Provider  Electronically Signed by           Brenda Mckeon MD  06/09/21 3839

## 2021-06-09 NOTE — CONSULTS
This 19-year-old gentleman presents complaining of mild discomfort around his right eye associated with bleeding  He is status post fall but denies striking his eye or orbital region  He injured his chest when he fell  The patient also reports a 5 minutes episode of total vision loss in his right eye 2 weeks ago  He had no associated symptoms and resolved rapidly  He saw his ophthalmologist several days before this incident  On examination, visual acuity with correction at near is 20/70 in the right eye and 2020 in the left  Pupils are equal round reactive to light with no afferent defect  Extraocular movements are full  There is 3+ subconjunctival hemorrhage noted in the right eye with periocular ecchymosis as well  Both corneas are clear  The anterior chambers are formed  Intra-ocular pressures are 21 and 17  The eyes were dilated with Mydriacyl and Dell-Synephrine at 5:30 p m       The lenses are mildly nuclear sclerotic  The vitreous cavities are clear both eyes  The optic nerves are pink and flat with physiologic cupping  The macula vessels and periphery are unremarkable  Impression:    1: subconjunctival hemorrhage right eye  This is of no consequence and needs no treatment  The condition will resolve spontaneously over the next 2 weeks  2:  Amaurosis fugax  The patient gives a very convincing history for amaurosis, suggesting possible carotid artery disease or cardiac arrhythmias or other abnormalities  I will leave it to the discretion of the house staff as to what workup is appropriate in this setting  Plan:  Observation  Please re-consult as needed as conditions warrant

## 2021-06-09 NOTE — QUICK NOTE
After evaluation by ophthalmology, evaluation to for amaurosis fugax was recommended given patient's transient vision loss  I discussed with patient that it is recommended that he be admitted to the hospital for further workup for this problem  Patient stated that he cannot stay because he has an elderly wife at home that he has to care for  I discussed with patient that by leaving the hospital, he has multiple risks including death, permanent disability, permanent vision loss, stroke, cardiac arrest, and permanent organ damage  He expressed understanding of these risks and continues to opt for discharge  I discussed the importance of follow up with his primary care physician as soon as possible for further workup as well as ophthalmology which he is agreeable to  I advised patient that he is welcome to come back to the ED for evaluation if he decides to have this workup completed or if he experiences any symptoms      Naomie Miller MD  Emergency Medicine, PGY-2

## 2021-06-09 NOTE — H&P
H&P Exam - Trauma   [facility] 80 y o  male MRN: [id number]  Unit/Bed#: TR 02 Encounter: [id number]    Assessment/Plan   Trauma Alert: Level B  Model of Arrival: Ambulance  Trauma Team: Attending Yvonne Santiago, Fellow Larry Palomares and KENNETH Sutton  Consultants: none    Trauma Active Problems: S/P Fall yesterday  Jus Cody left chest wall  Denies striking head  Right orbital ecchymosis  Subconjunctival hemorrhage    Trauma Plan: observe in ER until can be seen by ophtho, then if clear can go home  Consult Ophtho - spoke with Dr Huy Gomes who will be here 5-5:30 pm  Diet    Chief Complaint: left rib pain    History of Present Illness   HPI:  Gloria Weir is a 80 y o  male who presents with a history of a fall yesterday and denies striking his head  States he struck the left chest wall and his ribs are tender  Woke up this morning with a black and blue right eye and blood in eye as well  Mechanism:Fall    Review of Systems   Constitutional: Negative  HENT: Negative  Eyes: Negative  Respiratory: Negative  Cardiovascular: Negative  Gastrointestinal: Negative  Endocrine: Negative  Genitourinary: Negative  Musculoskeletal: Negative  Skin: Negative  Allergic/Immunologic: Negative  Neurological: Negative  Hematological: Negative  Psychiatric/Behavioral: Negative  12-point, complete review of systems was reviewed and negative except as stated above  Historical Information   History is unobtainable from the patient   Efforts to obtain history included the following sources: EMS    Past Medical History:   Diagnosis Date    Dialysis patient [facility])      History reviewed  No pertinent surgical history    Social History   Social History     Substance and Sexual Activity   Alcohol Use Not Currently     Social History     Substance and Sexual Activity   Drug Use Not Currently     Social History     Tobacco Use   Smoking Status Never Smoker   Smokeless Tobacco Never Used     E-Cigarette/Vaping E-Cigarette/Vaping Substances       There is no immunization history on file for this patient  Last Tetanus: EMS  Family History: Non-contributory        Meds/Allergies   NKDA    No Known Allergies      PHYSICAL EXAM    PE limited by: nothing    Objective   Vitals:   First set: Temperature: 97 8 °F (36 6 °C) (06/09/21 1155)  Pulse: 88 (06/09/21 1155)  Respirations: 18 (06/09/21 1155)  Blood Pressure: 113/56 (06/09/21 1155)    Primary Survey:   (A) Airway: intact  (B) Breathing: non-labored  (C) Circulation: Pulses:   radial  2/4 and femoral  2/4  (D) Disabliity:  GCS Total:  15, Eye Opening:   Spontaneous = 4, Motor Response: Obeys commands = 6 and Verbal Response:  Oriented = 5  (E) Expose:  Completed    Secondary Survey: (Click on Physical Exam tab above)  Physical Exam  Constitutional:       Appearance: Normal appearance  HENT:      Head: Normocephalic  Right Ear: Tympanic membrane normal       Left Ear: Tympanic membrane normal       Nose: Nose normal    Eyes:      Extraocular Movements: Extraocular movements intact  Conjunctiva/sclera: Conjunctivae normal       Pupils: Pupils are equal, round, and reactive to light  Neck:      Musculoskeletal: Normal range of motion and neck supple  Cardiovascular:      Rate and Rhythm: Normal rate and regular rhythm  Pulses: Normal pulses  Heart sounds: Normal heart sounds  Pulmonary:      Effort: Pulmonary effort is normal       Breath sounds: Normal breath sounds  Chest:      Chest wall: Tenderness present  Abdominal:      General: Abdomen is flat  Palpations: Abdomen is soft  Tenderness: There is no abdominal tenderness  Genitourinary:     Comments: Perineum clear  Musculoskeletal: Normal range of motion  Skin:     General: Skin is warm and dry  Capillary Refill: Capillary refill takes less than 2 seconds  Neurological:      General: No focal deficit present        Mental Status: He is alert and oriented to person, place, and time  Psychiatric:         Mood and Affect: Mood normal          Behavior: Behavior normal          Thought Content:  Thought content normal          Judgment: Judgment normal          Invasive Devices     Peripheral Intravenous Line            Peripheral IV 06/09/21 Right Antecubital less than 1 day                Lab Results: ISTAT  Imaging/EKG Studies: CXR  Neg, HCT - neg  Other Studies: none    Code Status: No Order  Advance Directive and Living Will:      Power of :    POLST:      PLan to discharge home after seen by Bob

## 2021-06-09 NOTE — CASE MANAGEMENT
CM responded to trauma alert   Pt was brought to the ED via [facility] s/p fall, -headstrike, -LOC but c/o eye pain and chest pain

## 2021-06-09 NOTE — DISCHARGE INSTRUCTIONS
Hemorragia subconjuntival   LO QUE NECESITA SABER:   Se produce patric hemorragia subconjuntival cuando se acumula emmy debajo de la conjuntiva del ivan  La conjuntiva es el revestimiento transparente que cubre la parte cristobal del ivan  La emmy proviene de un vaso sanguíneo que se ha roto debajo de la conjuntiva  INSTRUCCIONES SOBRE [facility]:   Cuidado del ivan:  · Compresas frías o tibias: Use patric compresa fría jon las primeras 24 horas  Pregunte con Sharion Leonel y jon cuánto tiempo debería aplicársela cada vez  Después de las primeras 24 horas, póngase patric compresa tibia en el ivan  Damari esto 3 veces al día por unos 10 a 15 minutos cada vez  · Gotas oculares: Puede que necesite lágrimas artificiales para [employer]  Use las gotas rose [employer]	Guffey indiquen  Programe patric jaqueline de seguimiento con paez médico u oftalmólogo según indicaciones: Anote es preguntas para que se acuerde de hacerlas jon es visitas  Comuníquese con paez médico o paez especialista del ivan si:  · Todavía tiene la lele thom en el ivan después de 3 semanas  · Tiene otra hemorragia subconjuntival     · Tiene hemorragia subconjuntival en ambos ojos  · Usted tiene preguntas o inquietudes acerca de paez condición o cuidado  Regrese a la tomasz de emergencias si:  · Le duele el ivan o tiene sensibilidad a la chary  · Paez visión cambia  · Paez ivan supura un líquido palacios o amarillo  © Copyright [address] Information is for End User's use only and may not be sold, redistributed or otherwise used for commercial purposes  All illustrations and images included in CareNotes® are the copyrighted property of [employer] [facility] [employer]  or [address] es sólo para uso en educación  Paez intención no es darle un consejo médico sobre enfermedades o tratamientos   Colsulte con paez Verlon Davon farmacéutico antes de seguir cualquier régimen médico para saber si es seguro y [place] para usted

## 2021-06-11 ENCOUNTER — TELEPHONE (OUTPATIENT)
Dept: CARDIOLOGY CLINIC | Facility: CLINIC | Age: 81
End: 2021-06-11

## 2021-06-11 NOTE — TELEPHONE ENCOUNTER
If saw eye doctor and not advised by them, and seen in ER and everything else was ok, then I would recommend continuing the coumadin

## 2021-06-11 NOTE — TELEPHONE ENCOUNTER
Family called   Pt was blowing leaves and tripped, but did not fall  Went to ER 6/9/21 and family said he saw Eye doctor  They said the eye doctor told him everything was fine with the eye  He is on coumadin and has subconjuctival hemorrhage  Does he need to stop coumadin?       Please advise

## 2021-06-18 DIAGNOSIS — N18.9 CHRONIC KIDNEY DISEASE-MINERAL AND BONE DISORDER: ICD-10-CM

## 2021-06-18 DIAGNOSIS — E83.9 CHRONIC KIDNEY DISEASE-MINERAL AND BONE DISORDER: ICD-10-CM

## 2021-06-18 DIAGNOSIS — M89.9 CHRONIC KIDNEY DISEASE-MINERAL AND BONE DISORDER: ICD-10-CM

## 2021-06-25 ENCOUNTER — APPOINTMENT (OUTPATIENT)
Dept: LAB | Facility: CLINIC | Age: 81
End: 2021-06-25
Payer: COMMERCIAL

## 2021-06-25 ENCOUNTER — ANTICOAG VISIT (OUTPATIENT)
Dept: CARDIOLOGY CLINIC | Facility: CLINIC | Age: 81
End: 2021-06-25

## 2021-06-28 RX ORDER — IBUPROFEN 800 MG/1
800 TABLET ORAL EVERY 6 HOURS
COMMUNITY
Start: 2021-05-06 | End: 2021-01-01 | Stop reason: ALTCHOICE

## 2021-07-01 ENCOUNTER — OFFICE VISIT (OUTPATIENT)
Dept: FAMILY MEDICINE CLINIC | Facility: CLINIC | Age: 81
End: 2021-07-01
Payer: COMMERCIAL

## 2021-07-01 VITALS
HEIGHT: 67 IN | OXYGEN SATURATION: 96 % | WEIGHT: 168.6 LBS | SYSTOLIC BLOOD PRESSURE: 120 MMHG | RESPIRATION RATE: 16 BRPM | DIASTOLIC BLOOD PRESSURE: 60 MMHG | TEMPERATURE: 97.8 F | BODY MASS INDEX: 26.46 KG/M2 | HEART RATE: 84 BPM

## 2021-07-01 DIAGNOSIS — B37.2 CANDIDAL INTERTRIGO: Primary | ICD-10-CM

## 2021-07-01 DIAGNOSIS — G89.29 CHRONIC LEFT SHOULDER PAIN: ICD-10-CM

## 2021-07-01 DIAGNOSIS — M25.512 CHRONIC LEFT SHOULDER PAIN: ICD-10-CM

## 2021-07-01 PROCEDURE — 3074F SYST BP LT 130 MM HG: CPT | Performed by: NURSE PRACTITIONER

## 2021-07-01 PROCEDURE — 1160F RVW MEDS BY RX/DR IN RCRD: CPT | Performed by: NURSE PRACTITIONER

## 2021-07-01 PROCEDURE — 1036F TOBACCO NON-USER: CPT | Performed by: NURSE PRACTITIONER

## 2021-07-01 PROCEDURE — 3078F DIAST BP <80 MM HG: CPT | Performed by: NURSE PRACTITIONER

## 2021-07-01 PROCEDURE — 99213 OFFICE O/P EST LOW 20 MIN: CPT | Performed by: NURSE PRACTITIONER

## 2021-07-01 RX ORDER — NYSTATIN 100000 [USP'U]/G
POWDER TOPICAL 3 TIMES DAILY
Qty: 45 G | Refills: 0 | Status: SHIPPED | OUTPATIENT
Start: 2021-07-01 | End: 2021-01-01 | Stop reason: ALTCHOICE

## 2021-07-01 NOTE — PROGRESS NOTES
FAMILY PRACTICE OFFICE VISIT       NAME: lOiver Sharpe  AGE: 80 y o  SEX: male       : 1940        MRN: [id number]    Assessment and Plan     Problem List Items Addressed This Visit     None      Visit Diagnoses     Candidal intertrigo    -  Primary    Relevant Medications    nystatin (MYCOSTATIN) powder    Chronic left shoulder pain        Relevant Orders    Ambulatory referral to Orthopedic Surgery          1  Candidal intertrigo  nystatin (MYCOSTATIN) powder   2  Chronic left shoulder pain  Ambulatory referral to Orthopedic Surgery     Left axilla rash consistent with Candida intertrigo  Will treat with nystatin powder 3 times daily until clear  If rash is not improving over the next 1 week he will call  Chronic left shoulder pain for the past 1 year  Pain is present only with movement, and he has limited range of motion of shoulder  Suspect rotator cuff tendinopathy, possible tear  Recommend follow-up with Orthopedics  Contact information provided today  Chief Complaint     Chief Complaint   Patient presents with    Rash     Pt is here for rash under left armpit 2 + wks       History of Present Illness     Oliver Sharpe is a 80year old male presenting today for left axilla rash  Started 2 weeks ago  Itchy  Not sore  Also has left shoulder pain for the past one year  Hard to lift arm above his head  Pain with movement  No pain at rest    +weakness  Right handed  No known injury  Review of Systems   Review of Systems   Constitutional: Negative  Respiratory: Negative  Cardiovascular: Negative  Musculoskeletal: Positive for arthralgias (left shoulder)  Skin: Positive for rash  Active Problem List     Patient Active Problem List   Diagnosis    Rotator cuff tear arthropathy, right    Secondary osteoarthritis of right shoulder    Benign essential hypertension    Overweight (BMI 25 0-29  9)    Lumbar pain    Benign prostatic hyperplasia without lower urinary tract symptoms    Bradycardia    Dissection of thoracic aorta ([facility])    S/P aorta repair    Anticoagulation goal of INR 2 to 3    Hyperphosphatemia    Hypotension    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    Complication of vascular dialysis catheter    Hyperlipidemia    Sleep disorder    Encounter for post surgical wound check    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    Monoclonal gammopathy    Paroxysmal atrial fibrillation (HCC)    GONZALES (dyspnea on exertion)    Acute on chronic diastolic (congestive) heart failure ([facility])    Anemia    Chronic kidney disease-mineral and bone disorder    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 ([place] [address] )    Rheumatoid arthritis ([facility])    Insomnia    ESRD (end stage renal disease) ([place] [address] )    Status post placement of arteriovenous graft    Subconjunctival hemorrhage of right eye    Vision loss       Past Medical History:  Past Medical History:   Diagnosis Date    Acute renal failure superimposed on stage 4 chronic kidney disease ([place] [address] ) 10/4/2020    Arthritis     BPH without urinary obstruction     CKD (chronic kidney disease), stage III ([facility])     baseline 1 6-1 7    Dialysis patient ([place] [address] )     GERD (gastroesophageal reflux disease)     Hyperlipidemia     Hypertension     MI (myocardial infarction) ([place] [address] )        Past Surgical History:  Past Surgical History:   Procedure Laterality Date    APPENDECTOMY      CARDIAC SURGERY      COLONOSCOPY  2017    FRACTURE SURGERY      IR PERITONEAL DIALYSIS CATHETER PLACEMENT  2/3/2021    IR PERITONEAL DIALYSIS CATHETER REMOVAL  2/17/2021    IR PERITONEAL DIALYSIS CATHETER REMOVAL AND PLACEMENT NEW SITE  2/9/2021    IR TEMPORARY DIALYSIS CATHETER PLACEMENT  12/23/2019    IR THORACENTESIS  12/24/2019    IR THORACENTESIS  12/27/2019    IR TUNNELED CENTRAL LINE REMOVAL  4/27/2021    IR TUNNELED DIALYSIS CATHETER CHECK/CHANGE/REPOSITION/ANGIOPLASTY  1/6/2020    IR TUNNELED DIALYSIS CATHETER PLACEMENT  1/2/2020    IR TUNNELED DIALYSIS CATHETER PLACEMENT  2/15/2021    IR TUNNELED DIALYSIS CATHETER REMOVAL  3/4/2020    DE ANASTOMOSIS,AV,ANY SITE Left 3/26/2021    Procedure: LEFT FOREARM LOOP AV GRAFT;  Surgeon: Moira Nick MD;  [facility];  Service: Vascular    DE ASCEND AORTA GRAFT W ROOT REPLACMENT  VALVE CONDUIT/CORON RECONSTRUCT N/A 12/15/2019    Procedure: BENTALL PROCEDURE (ASCENDING AORTIC REPAIR) with 26mm Gelweave Graft;  Surgeon: Anson Posada DO;  [facility];  Service: Cardiac Surgery    DE RECONSTR TOTAL SHOULDER IMPLANT Right 12/5/2017    Procedure: ARTHROPLASTY SHOULDER REVERSE with OPEN CYST EXCISION;  Surgeon: Surya Weber MD;  [facility];  Service: Orthopedics    SHOULDER SURGERY      WRIST SURGERY         Family History:  Family History   Problem Relation Age of Onset    No Known Problems Mother     Hypertension Father     Arthritis Family     No Known Problems Daughter        Social History:  Social History     Socioeconomic History    Marital status: /Civil Union     Spouse name: Not on file    Number of children: Not on file    Years of education: Not on file    Highest education level: Not on file   Occupational History    Not on file   Tobacco Use    Smoking status: Never Smoker    Smokeless tobacco: Never Used   Vaping Use    Vaping Use: Never used   Substance and Sexual Activity    Alcohol use: Not Currently    Drug use: Not Currently    Sexual activity: Not Currently     Partners: Female   Other Topics Concern    Not on file   Social History Narrative    ** Merged History Encounter **         Daily caffeine consumption, 1 serving a day  Drinks coffee      Social Determinants of Health     Financial Resource Strain: Low Risk     Difficulty of Paying Living Expenses: Not hard at all   Food Insecurity: No Food Insecurity    Worried About [address] in the Last Year: Never true    [employer] in the Last Year: Never true   Transportation Needs: No Transportation Needs    Lack of Transportation (Medical): No    Lack of Transportation (Non-Medical): No   Physical Activity:     Days of Exercise per Week:     Minutes of Exercise per Session:    Stress:     Feeling of Stress :    Social Connections:     Frequency of Communication with Friends and Family:     Frequency of Social Gatherings with Friends and Family:     Attends Mosque Services:     Active Member of Clubs or Organizations:     Attends Club or Organization Meetings:     Marital Status:    Intimate Partner Violence:     Fear of Current or Ex-Partner:     Emotionally Abused:     Physically Abused:     Sexually Abused:        I have reviewed the patient's medical history in detail; there are no changes to the history as noted in the electronic medical record  Objective     Vitals:    07/01/21 0738   BP: 120/60   Pulse: 84   Resp: 16   Temp: 97 8 °F (36 6 °C)   TempSrc: Temporal   SpO2: 96%   Weight: 76 5 kg (168 lb 9 6 oz)   Height: 5' 7" (1 702 m)     Wt Readings from Last 3 Encounters:   07/01/21 76 5 kg (168 lb 9 6 oz)   04/29/21 76 2 kg (168 lb)   04/15/21 73 9 kg (163 lb)     Physical Exam  Vitals and nursing note reviewed  Constitutional:       Appearance: Normal appearance  He is not ill-appearing or toxic-appearing  Cardiovascular:      Rate and Rhythm: Normal rate and regular rhythm  Heart sounds: No murmur heard  Pulmonary:      Effort: No respiratory distress  Breath sounds: Normal breath sounds  No wheezing or rales  Musculoskeletal:      Left shoulder: No swelling, deformity or tenderness  Decreased range of motion  Decreased strength  Right lower leg: No edema  Left lower leg: No edema  Skin:     Findings: Rash (left axilla with red flat rash, few satellite erythematous papules  ) present  Neurological:      Mental Status: He is alert     Psychiatric:         Mood and Affect: Mood normal             ALLERGIES:  No Known Allergies    Current Medications     Current Outpatient Medications   Medication Sig Dispense Refill    atorvastatin (LIPITOR) 40 mg tablet TAKE 1 TABLET (40 MG TOTAL) BY MOUTH DAILY 90 tablet 0    b complex-vitamin C-folic acid (NEPHROCAPS) 1 mg capsule Take 1 capsule by mouth daily with dinner 30 capsule 0    bumetanide (BUMEX) 2 mg tablet Take 2 tablets (4 mg total) by mouth 2 (two) times a day 120 tablet 2    diphenhydrAMINE (BENADRYL) 25 mg tablet Take 25 mg by mouth every 6 (six) hours as needed for itching      docusate sodium (COLACE) 100 mg capsule TAKE 1 CAPSULE (100 MG TOTAL) BY MOUTH 2 (TWO) TIMES A DAY 30 capsule 3    ibuprofen (MOTRIN) 800 mg tablet Take 800 mg by mouth every 6 (six) hours      lidocaine-prilocaine (EMLA) cream Apply topically as needed for mild pain (with HD sessions) With dialysis sessions 30 g 3    metoprolol tartrate (LOPRESSOR) 25 mg tablet Take 25 mg by mouth every 12 (twelve) hours      potassium chloride (K-DUR,KLOR-CON) 20 mEq tablet TAKE 1 TABLET (20 MEQ TOTAL) BY MOUTH 2 (TWO) TIMES A DAY 60 tablet 5    warfarin (COUMADIN) 2 5 mg tablet Take as directed per coumadin clinic 60 tablet 5    acetaminophen (TYLENOL) 325 mg tablet Take 1-2 tabs q6h PRN mild fever/pain (Patient not taking: Reported on 4/29/2021) 90 tablet 0    nystatin (MYCOSTATIN) powder Apply topically 3 (three) times a day 45 g 0     No current facility-administered medications for this visit           Health Maintenance     Health Maintenance   Topic Date Due    Influenza Vaccine (1) 09/01/2021    Fall Risk  01/07/2022    Depression Screening PHQ  01/07/2022    Medicare Annual Wellness Visit (AWV)  01/07/2022    BMI: Followup Plan  01/17/2022    BMI: Adult  07/01/2022    DTaP,Tdap,and Td Vaccines (2 - Td or Tdap) 05/01/2029    Hepatitis C Screening  Completed    Pneumococcal Vaccine: 65+ Years  Completed    COVID-19 Vaccine  Completed    HIB Vaccine Aged Out    Hepatitis B Vaccine  Aged Out    IPV Vaccine  Aged Out    Hepatitis A Vaccine  Aged Out    Meningococcal ACWY Vaccine  Aged Out    HPV Vaccine  Aged [employer] History   Administered Date(s) Administered    INFLUENZA 10/16/2014, 09/02/2015, 08/15/2016, 09/15/2018    Influenza Quadrivalent Preservative Free 3 years and older IM 10/16/2014    Influenza Split High Dose Preservative Free IM 01/20/2015    Influenza, high dose seasonal 0 7 mL 10/28/2020    Pneumococcal Conjugate 13-Valent 04/17/2019    Pneumococcal Polysaccharide PPV23 01/07/2021    SARS-CoV-2 / COVID-19 mRNA IM (Pfizer-BioNTech) 03/31/2021, 04/21/2021    Tdap 05/01/2019    Tuberculin Skin Test-PPD Intradermal 01/09/2020, 02/22/2021    Zoster 01/20/2015    Zoster Vaccine Recombinant 09/15/2018, 04/17/2019    influenza, trivalent, adjuvanted 08/28/2019       EL Meyers

## 2021-07-07 ENCOUNTER — HOSPITAL ENCOUNTER (EMERGENCY)
Facility: HOSPITAL | Age: 81
Discharge: HOME | End: 2021-07-07
Attending: EMERGENCY MEDICINE | Admitting: EMERGENCY MEDICINE
Payer: COMMERCIAL

## 2021-07-07 ENCOUNTER — TELEPHONE (OUTPATIENT)
Dept: FAMILY MEDICINE CLINIC | Facility: CLINIC | Age: 81
End: 2021-07-07

## 2021-07-07 VITALS
OXYGEN SATURATION: 97 % | BODY MASS INDEX: 26.37 KG/M2 | HEART RATE: 81 BPM | WEIGHT: 168 LBS | TEMPERATURE: 100 F | RESPIRATION RATE: 18 BRPM | HEIGHT: 67 IN | DIASTOLIC BLOOD PRESSURE: 54 MMHG | SYSTOLIC BLOOD PRESSURE: 100 MMHG

## 2021-07-07 DIAGNOSIS — Z20.1 RECENT EXPOSURE TO TUBERCULOSIS: Primary | ICD-10-CM

## 2021-07-07 DIAGNOSIS — Z20.1 EXPOSURE TO TB: Primary | ICD-10-CM

## 2021-07-07 PROCEDURE — 99284 EMERGENCY DEPT VISIT MOD MDM: CPT | Performed by: EMERGENCY MEDICINE

## 2021-07-07 PROCEDURE — 99283 EMERGENCY DEPT VISIT LOW MDM: CPT

## 2021-07-07 PROCEDURE — 36415 COLL VENOUS BLD VENIPUNCTURE: CPT | Performed by: EMERGENCY MEDICINE

## 2021-07-07 PROCEDURE — 86480 TB TEST CELL IMMUN MEASURE: CPT | Performed by: EMERGENCY MEDICINE

## 2021-07-07 NOTE — ED PROVIDER NOTES
History  Chief Complaint   Patient presents with    Tuberculosis Exposure     Patient reports that he was exposed to TB about a week ago and wants to be checked     Ana Mcintosh is a 80y o  year old male with PMH of ESRD on dialysis, CAD, HTN, HLD presenting to the [facility] ED for TB exposure  Patient's granddaughter reportedly admitted currently to [facility] and diagnosed with active TB infection  Granddaughter spent three days in patient's home one week ago  Patient at this time denies fevers/chills, night sweats, weight loss, cough, dyspnea  Patient referred to ED for evaluation due to exposure  History provided by:  Patient   used: No        Prior to Admission Medications   Prescriptions Last Dose Informant Patient Reported?  Taking?   acetaminophen (TYLENOL) 325 mg tablet  Self No No   Sig: Take 1-2 tabs q6h PRN mild fever/pain   Patient not taking: Reported on 4/29/2021   atorvastatin (LIPITOR) 40 mg tablet   No No   Sig: TAKE 1 TABLET (40 MG TOTAL) BY MOUTH DAILY   b complex-vitamin C-folic acid (NEPHROCAPS) 1 mg capsule  Self No No   Sig: Take 1 capsule by mouth daily with dinner   bumetanide (BUMEX) 2 mg tablet   No No   Sig: Take 2 tablets (4 mg total) by mouth 2 (two) times a day   diphenhydrAMINE (BENADRYL) 25 mg tablet  Self Yes No   Sig: Take 25 mg by mouth every 6 (six) hours as needed for itching   docusate sodium (COLACE) 100 mg capsule   No No   Sig: TAKE 1 CAPSULE (100 MG TOTAL) BY MOUTH 2 (TWO) TIMES A DAY   ibuprofen (MOTRIN) 800 mg tablet   Yes No   Sig: Take 800 mg by mouth every 6 (six) hours   lidocaine-prilocaine (EMLA) cream   No No   Sig: Apply topically as needed for mild pain (with HD sessions) With dialysis sessions   metoprolol tartrate (LOPRESSOR) 25 mg tablet   Yes No   Sig: Take 25 mg by mouth every 12 (twelve) hours   nystatin (MYCOSTATIN) powder   No No   Sig: Apply topically 3 (three) times a day   potassium chloride (K-DUR,KLOR-CON) 20 mEq tablet  Self No No Sig: TAKE 1 TABLET (20 MEQ TOTAL) BY MOUTH 2 (TWO) TIMES A DAY   warfarin (COUMADIN) 2 5 mg tablet   No No   Sig: Take as directed per coumadin clinic      Facility-Administered Medications: None       Past Medical History:   Diagnosis Date    Acute renal failure superimposed on stage 4 chronic kidney disease ([place] [address] ) 10/4/2020    Arthritis     BPH without urinary obstruction     CKD (chronic kidney disease), stage III (HCC)     baseline 1 6-1 7    Dialysis patient ([place] [address] )     GERD (gastroesophageal reflux disease)     Hyperlipidemia     Hypertension     MI (myocardial infarction) ([place] [address] )        Past Surgical History:   Procedure Laterality Date    APPENDECTOMY      CARDIAC SURGERY      COLONOSCOPY  2017    FRACTURE SURGERY      IR PERITONEAL DIALYSIS CATHETER PLACEMENT  2/3/2021    IR PERITONEAL DIALYSIS CATHETER REMOVAL  2/17/2021    IR PERITONEAL DIALYSIS CATHETER REMOVAL AND PLACEMENT NEW SITE  2/9/2021    IR TEMPORARY DIALYSIS CATHETER PLACEMENT  12/23/2019    IR THORACENTESIS  12/24/2019    IR THORACENTESIS  12/27/2019    IR TUNNELED CENTRAL LINE REMOVAL  4/27/2021    IR TUNNELED DIALYSIS CATHETER CHECK/CHANGE/REPOSITION/ANGIOPLASTY  1/6/2020    IR TUNNELED DIALYSIS CATHETER PLACEMENT  1/2/2020    IR TUNNELED DIALYSIS CATHETER PLACEMENT  2/15/2021    IR TUNNELED DIALYSIS CATHETER REMOVAL  3/4/2020    TN ANASTOMOSIS,AV,ANY SITE Left 3/26/2021    Procedure: LEFT FOREARM LOOP AV GRAFT;  Surgeon: Genet Mckeon MD;  [facility]: [facility];  Service: Vascular    TN ASCEND AORTA GRAFT W ROOT REPLACMENT  VALVE CONDUIT/CORON RECONSTRUCT N/A 12/15/2019    Procedure: BENTALL PROCEDURE (ASCENDING AORTIC REPAIR) with 26mm Gelweave Graft;  Surgeon: Jose David Bowman DO;  [facility];  Service: Cardiac Surgery    TN RECONSTR TOTAL SHOULDER IMPLANT Right 12/5/2017    Procedure: ARTHROPLASTY SHOULDER REVERSE with OPEN CYST EXCISION;  Surgeon: Kacy Jones MD;  [facility];  Service: Orthopedics    SHOULDER SURGERY      WRIST SURGERY         Family History   Problem Relation Age of Onset    No Known Problems Mother     Hypertension Father     Arthritis Family     No Known Problems Daughter      I have reviewed and agree with the history as documented  E-Cigarette/Vaping    E-Cigarette Use Never User      E-Cigarette/Vaping Substances    Nicotine No     THC No     CBD No     Flavoring No      Social History     Tobacco Use    Smoking status: Never Smoker    Smokeless tobacco: Never Used   Vaping Use    Vaping Use: Never used   Substance Use Topics    Alcohol use: Not Currently    Drug use: Not Currently        Review of Systems   Constitutional: Negative for appetite change, chills, fatigue, fever and unexpected weight change  HENT: Negative for congestion, nosebleeds and rhinorrhea  Eyes: Negative for visual disturbance  Respiratory: Negative for cough, shortness of breath and wheezing  Cardiovascular: Negative for chest pain and leg swelling  Gastrointestinal: Negative for abdominal distention, abdominal pain, diarrhea, nausea and vomiting  Musculoskeletal: Negative for arthralgias and myalgias  Skin: Negative for rash  Neurological: Negative for syncope and headaches  Psychiatric/Behavioral: Negative for behavioral problems and confusion  All other systems reviewed and are negative  Physical Exam  ED Triage Vitals [07/07/21 1552]   Temperature Pulse Respirations Blood Pressure SpO2   100 °F (37 8 °C) (!) 18 18 100/54 97 %      Temp Source Heart Rate Source Patient Position - Orthostatic VS BP Location FiO2 (%)   Oral Monitor Sitting Right arm --      Pain Score       No Pain             Orthostatic Vital Signs  Vitals:    07/07/21 1552 07/07/21 1737   BP: 100/54    Pulse: (!) 18 81   Patient Position - Orthostatic VS: Sitting        Physical Exam  Vitals and nursing note reviewed  Constitutional:       General: He is not in acute distress  Appearance: Normal appearance  He is well-developed  He is not ill-appearing, toxic-appearing or diaphoretic  HENT:      Head: Normocephalic and atraumatic  Nose: No congestion or rhinorrhea  Eyes:      General:         Right eye: No discharge  Left eye: No discharge  Cardiovascular:      Rate and Rhythm: Normal rate and regular rhythm  Heart sounds: Murmur (holosystolic) heard  Pulmonary:      Effort: Pulmonary effort is normal  No respiratory distress  Breath sounds: Normal breath sounds  No wheezing or rales  Abdominal:      General: Bowel sounds are normal       Palpations: Abdomen is soft  Tenderness: There is no abdominal tenderness  There is no guarding or rebound  Musculoskeletal:      Cervical back: Normal range of motion and neck supple  No rigidity  Right lower leg: No edema  Left lower leg: No edema  Skin:     General: Skin is warm  Capillary Refill: Capillary refill takes less than 2 seconds  Findings: No rash  Neurological:      Mental Status: He is alert and oriented to person, place, and time  Psychiatric:         Mood and Affect: Mood normal          Behavior: Behavior normal          ED Medications  Medications - No data to display    Diagnostic Studies  Results Reviewed     Procedure Component Value Units Date/Time    Quantiferon TB Gold Plus [[id number]] Collected: 07/07/21 1810    Lab Status: In process Specimen: Blood from Arm, Right Updated: 07/07/21 1814                 No orders to display         Procedures  Procedures      ED Course               Identification of Seniors at Risk      Most Recent Value   (ISAR) Identification of Seniors at Risk   Before the illness or injury that brought you to the Emergency, did you need someone to help you on a regular basis?   0 Filed at: 07/07/2021 1553   In the last 24 hours, have you needed more help than usual?  0 Filed at: 07/07/2021 1553   Have you been hospitalized for one or more nights during the past 6 months? 0 Filed at: 07/07/2021 1553   In general, do you see well?  0 Filed at: 07/07/2021 1553   In general, do you have serious problems with your memory? 0 Filed at: 07/07/2021 1553   Do you take more than three different medications every day? 1 Filed at: 07/07/2021 1553   ISAR Score  1 Filed at: 07/07/2021 1553                              [facility]  Number of Diagnoses or Management Options  Exposure to TB  Diagnosis management comments:   80 y o  male presenting for TB exposure  Patient well appearing and currently denies complaints  D/w ID Dr Tony Nixon who states no need for empiric treatment and no need to rereport exposure to community health agencies  Will check baseline quantiferon level in ED  I have discussed with the patient our plan to discharge them from the ED and the patient is in agreement with this plan  The patient was provided a written after visit summary with strict RTED precautions  Discharge Plan: PCP f/u PRN, will call with results of quantiferon test     Followup: I have discussed with the patient plan to follow up with their PCP  Contact information provided in AVS        Amount and/or Complexity of Data Reviewed  Clinical lab tests: ordered  Review and summarize past medical records: yes  Discuss the patient with other providers: yes    Patient Progress  Patient progress: stable      Disposition  Final diagnoses:   Exposure to TB     Time reflects when diagnosis was documented in both MDM as applicable and the Disposition within this note     Time User Action Codes Description Comment    7/7/2021  5:38 PM Brittany Richards Add [Z20 1] Exposure to TB       ED Disposition     ED Disposition Condition Date/Time Comment    Discharge Stable Wed Jul 7, 2021  5:37 PM Treva SEBASTIAN Core discharge to home/self care              Follow-up Information     Follow up With Specialties Details Why Contact Info    Josie Graham, [id number], Nurse Practitioner Call  If symptoms worsen [address]            Discharge Medication List as of 7/7/2021  5:38 PM      CONTINUE these medications which have NOT CHANGED    Details   acetaminophen (TYLENOL) 325 mg tablet Take 1-2 tabs q6h PRN mild fever/pain, Normal      atorvastatin (LIPITOR) 40 mg tablet TAKE 1 TABLET (40 MG TOTAL) BY MOUTH DAILY, Starting Tue 6/1/2021, Normal      b complex-vitamin C-folic acid (NEPHROCAPS) 1 mg capsule Take 1 capsule by mouth daily with dinner, Starting Wed 2/17/2021, Normal      bumetanide (BUMEX) 2 mg tablet Take 2 tablets (4 mg total) by mouth 2 (two) times a day, Starting Mon 5/3/2021, Normal      diphenhydrAMINE (BENADRYL) 25 mg tablet Take 25 mg by mouth every 6 (six) hours as needed for itching, Historical Med      docusate sodium (COLACE) 100 mg capsule TAKE 1 CAPSULE (100 MG TOTAL) BY MOUTH 2 (TWO) TIMES A DAY, Starting Mon 5/3/2021, Normal      ibuprofen (MOTRIN) 800 mg tablet Take 800 mg by mouth every 6 (six) hours, Starting Thu 5/6/2021, Historical Med      lidocaine-prilocaine (EMLA) cream Apply topically as needed for mild pain (with HD sessions) With dialysis sessions, Starting Tue 5/4/2021, Normal      metoprolol tartrate (LOPRESSOR) 25 mg tablet Take 25 mg by mouth every 12 (twelve) hours, Starting Mon 6/14/2021, Historical Med      nystatin (MYCOSTATIN) powder Apply topically 3 (three) times a day, Starting Thu 7/1/2021, Normal      potassium chloride (K-DUR,KLOR-CON) 20 mEq tablet TAKE 1 TABLET (20 MEQ TOTAL) BY MOUTH 2 (TWO) TIMES A DAY, Starting Mon 3/29/2021, Normal      warfarin (COUMADIN) 2 5 mg tablet Take as directed per coumadin clinic, Normal           No discharge procedures on file  PDMP Review       Value Time User    PDMP Reviewed  Yes 3/26/2021 11:40 AM Maria Del Rosario Fountain PA-C           ED Provider  Attending physically available and evaluated Arden Manual A Core  I managed the patient along with the ED Attending      Electronically Signed by         Jude Weber DO  07/08/21 1452

## 2021-07-07 NOTE — TELEPHONE ENCOUNTER
Call placed to  ID-spoke with [facility] who advised I contact  ED for instructions for pt to be evaluated at the ED as this is the protocol for TB exposure  I did contact  ED for guidance on seeing pt for evaluation  The nurse advised I have the pt and and any one accompanying him to the ED wear a mask and tell the triage "person" they were exposed to a diagnosed active TB patient  L/M for pts grand daughter and daughter to return this call for the above instructions  Once they return this call an ADT21 can be placed

## 2021-07-07 NOTE — ED ATTENDING ATTESTATION
7/7/2021  IOz MD, saw and evaluated the patient  I have discussed the patient with the resident/non-physician practitioner and agree with the resident's/non-physician practitioner's findings, Plan of Care, and MDM as documented in the resident's/non-physician practitioner's note, except where noted  All available labs and Radiology studies were reviewed  I was present for key portions of any procedure(s) performed by the resident/non-physician practitioner and I was immediately available to provide assistance  At this point I agree with the current assessment done in the Emergency Department  I have conducted an independent evaluation of this patient a history and physical is as follows:  Pt grandchild diagnosed with TB and hospitalized  She was living with them for 3 days prior to admission  Pt  Has no symptoms no cough no night sweats Wants to be checked PE: alert lungs clear ambulatory without problem   MDM: will check quanteferon  ED Course         Critical Care Time  Procedures

## 2021-07-07 NOTE — TELEPHONE ENCOUNTER
Pts granddaughter called stating the child she is fostering has tested positive for TB  Pt has been exposed daily to child  How would you like to proceed?

## 2021-07-07 NOTE — TELEPHONE ENCOUNTER
Karla Jeans,    I would like him to see infectious disease, he likely will need prophylaxis treatment  I placed orders, please give contact information for infections disease office  Please also make sure he notifies the staff where he has dialysis  Thank you

## 2021-07-07 NOTE — DISCHARGE INSTRUCTIONS
You have been seen for TB exposure  You will receive a call with your Quantiferon blood test results  Return to the emergency department if you develop trouble breathing, cough, fevers, night sweats, weakness or any other symptoms of concern  Please follow up with your PCP as needed by calling the number provided

## 2021-07-14 LAB
GAMMA INTERFERON BACKGROUND BLD IA-ACNC: 0.03 IU/ML
M TB IFN-G BLD-IMP: NEGATIVE
M TB IFN-G CD4+ BCKGRND COR BLD-ACNC: 0.03 IU/ML
M TB IFN-G CD4+ BCKGRND COR BLD-ACNC: 0.07 IU/ML
MITOGEN IGNF BCKGRD COR BLD-ACNC: >10 IU/ML

## 2021-07-21 ENCOUNTER — HOSPITAL ENCOUNTER (EMERGENCY)
Facility: HOSPITAL | Age: 81
Discharge: HOME | End: 2021-07-21
Attending: EMERGENCY MEDICINE
Payer: COMMERCIAL

## 2021-07-21 VITALS
HEART RATE: 82 BPM | SYSTOLIC BLOOD PRESSURE: 122 MMHG | OXYGEN SATURATION: 96 % | WEIGHT: 165 LBS | RESPIRATION RATE: 18 BRPM | TEMPERATURE: 97.8 F | BODY MASS INDEX: 25.84 KG/M2 | DIASTOLIC BLOOD PRESSURE: 58 MMHG

## 2021-07-21 DIAGNOSIS — B37.2 CANDIDAL DERMATITIS: Primary | ICD-10-CM

## 2021-07-21 PROCEDURE — 99284 EMERGENCY DEPT VISIT MOD MDM: CPT | Performed by: EMERGENCY MEDICINE

## 2021-07-21 PROCEDURE — 99282 EMERGENCY DEPT VISIT SF MDM: CPT

## 2021-07-21 RX ORDER — KETOCONAZOLE 20 MG/G
CREAM TOPICAL DAILY
Qty: 15 G | Refills: 0 | Status: SHIPPED | OUTPATIENT
Start: 2021-07-21 | End: 2021-01-01 | Stop reason: SDUPTHER

## 2021-07-21 NOTE — ED PROVIDER NOTES
History  Chief Complaint   Patient presents with    Rash     pt was "rash" in l axilla was given powder by PCP not improving     Patient is a 80 yom with a pmh of aortic dissection and CKD with dialysis presented to the ED with a complaint of rash in his axilla bilaterally  He reports that the rash started in his left axilla approximately a month ago  It progressively worsened so he went to his PCP who prescribed him nystatin powder and told him to apply it 3 times daily  He says it has been two weeks and his rash has continued to worsen and is now spreading to his right axilla  He denies any changes in medication or Deoderant in the past year  He also reports that when he washes his axilla that he washes his hands before washing the other side  He has gone to great lengths to improve his condition but nothing is working  He describes his rash as pruritic in nature but denies it being painful  He also denies any other symptomology and reports that the rash is only located in his axillas and he has had no fever  Prior to Admission Medications   Prescriptions Last Dose Informant Patient Reported?  Taking?   acetaminophen (TYLENOL) 325 mg tablet  Self No No   Sig: Take 1-2 tabs q6h PRN mild fever/pain   Patient not taking: Reported on 4/29/2021   atorvastatin (LIPITOR) 40 mg tablet   No No   Sig: TAKE 1 TABLET (40 MG TOTAL) BY MOUTH DAILY   b complex-vitamin C-folic acid (NEPHROCAPS) 1 mg capsule  Self No No   Sig: Take 1 capsule by mouth daily with dinner   bumetanide (BUMEX) 2 mg tablet   No No   Sig: Take 2 tablets (4 mg total) by mouth 2 (two) times a day   diphenhydrAMINE (BENADRYL) 25 mg tablet  Self Yes No   Sig: Take 25 mg by mouth every 6 (six) hours as needed for itching   docusate sodium (COLACE) 100 mg capsule   No No   Sig: TAKE 1 CAPSULE (100 MG TOTAL) BY MOUTH 2 (TWO) TIMES A DAY   ibuprofen (MOTRIN) 800 mg tablet   Yes No   Sig: Take 800 mg by mouth every 6 (six) hours lidocaine-prilocaine (EMLA) cream   No No   Sig: Apply topically as needed for mild pain (with HD sessions) With dialysis sessions   metoprolol tartrate (LOPRESSOR) 25 mg tablet   Yes No   Sig: Take 25 mg by mouth every 12 (twelve) hours   nystatin (MYCOSTATIN) powder   No No   Sig: Apply topically 3 (three) times a day   potassium chloride (K-DUR,KLOR-CON) 20 mEq tablet  Self No No   Sig: TAKE 1 TABLET (20 MEQ TOTAL) BY MOUTH 2 (TWO) TIMES A DAY   warfarin (COUMADIN) 2 5 mg tablet   No No   Sig: Take as directed per coumadin clinic      Facility-Administered Medications: None       Past Medical History:   Diagnosis Date    Acute renal failure superimposed on stage 4 chronic kidney disease ([facility] [address] ) 10/4/2020    Arthritis     BPH without urinary obstruction     CKD (chronic kidney disease), stage III (HCC)     baseline 1 6-1 7    Dialysis patient ([place] [id number] )     GERD (gastroesophageal reflux disease)     Hyperlipidemia     Hypertension     MI (myocardial infarction) ([place] [address] )        Past Surgical History:   Procedure Laterality Date    APPENDECTOMY      CARDIAC SURGERY      COLONOSCOPY  2017    FRACTURE SURGERY      IR PERITONEAL DIALYSIS CATHETER PLACEMENT  2/3/2021    IR PERITONEAL DIALYSIS CATHETER REMOVAL  2/17/2021    IR PERITONEAL DIALYSIS CATHETER REMOVAL AND PLACEMENT NEW SITE  2/9/2021    IR TEMPORARY DIALYSIS CATHETER PLACEMENT  12/23/2019    IR THORACENTESIS  12/24/2019    IR THORACENTESIS  12/27/2019    IR TUNNELED CENTRAL LINE REMOVAL  4/27/2021    IR TUNNELED DIALYSIS CATHETER CHECK/CHANGE/REPOSITION/ANGIOPLASTY  1/6/2020    IR TUNNELED DIALYSIS CATHETER PLACEMENT  1/2/2020    IR TUNNELED DIALYSIS CATHETER PLACEMENT  2/15/2021    IR TUNNELED DIALYSIS CATHETER REMOVAL  3/4/2020    AK ANASTOMOSIS,AV,ANY SITE Left 3/26/2021    Procedure: LEFT FOREARM LOOP AV GRAFT;  Surgeon: Julio Villanueva MD;  [facility];  Service: Vascular    AK ASCEND AORTA GRAFT W ROOT REPLACMENT  VALVE CONDUIT/CORON RECONSTRUCT N/A 12/15/2019    Procedure: BENTALL PROCEDURE (ASCENDING AORTIC REPAIR) with 26mm Gelweave Graft;  Surgeon: Tj Gramajo DO;  [facility];  Service: Cardiac Surgery    NC RECONSTR TOTAL SHOULDER IMPLANT Right 12/5/2017    Procedure: ARTHROPLASTY SHOULDER REVERSE with OPEN CYST EXCISION;  Surgeon: Claudia Curry MD;  [facility];  Service: Orthopedics    SHOULDER SURGERY      WRIST SURGERY         Family History   Problem Relation Age of Onset    No Known Problems Mother     Hypertension Father     Arthritis Family     No Known Problems Daughter      I have reviewed and agree with the history as documented  E-Cigarette/Vaping    E-Cigarette Use Never User      E-Cigarette/Vaping Substances    Nicotine No     THC No     CBD No     Flavoring No      Social History     Tobacco Use    Smoking status: Never Smoker    Smokeless tobacco: Never Used   Vaping Use    Vaping Use: Never used   Substance Use Topics    Alcohol use: Not Currently    Drug use: Not Currently        Review of Systems   Constitutional: Negative for diaphoresis, fatigue and fever  HENT: Negative for congestion, postnasal drip, rhinorrhea and sore throat  Eyes: Negative  Negative for redness, itching and visual disturbance  Respiratory: Negative for cough, chest tightness, shortness of breath and wheezing  Cardiovascular: Negative for chest pain, palpitations and leg swelling  Gastrointestinal: Negative for abdominal pain, constipation, diarrhea, nausea and vomiting  Endocrine: Negative  Genitourinary: Negative for dysuria, frequency, hematuria and urgency  Musculoskeletal: Negative for arthralgias, neck pain and neck stiffness  Skin: Positive for rash  Allergic/Immunologic: Negative  Negative for environmental allergies and food allergies  Neurological: Negative for dizziness, syncope, weakness, numbness and headaches  Hematological: Negative  Psychiatric/Behavioral: Negative  All other systems reviewed and are negative  Physical Exam  ED Triage Vitals [07/21/21 1630]   Temperature Pulse Respirations Blood Pressure SpO2   97 8 °F (36 6 °C) 82 18 122/58 96 %      Temp Source Heart Rate Source Patient Position - Orthostatic VS BP Location FiO2 (%)   Oral Monitor Sitting Left arm --      Pain Score       --             Orthostatic Vital Signs  Vitals:    07/21/21 1630   BP: 122/58   Pulse: 82   Patient Position - Orthostatic VS: Sitting       Physical Exam  Vitals and nursing note reviewed  Constitutional:       General: He is not in acute distress  Appearance: Normal appearance  He is normal weight  He is not ill-appearing  HENT:      Nose: Nose normal  No congestion or rhinorrhea  Mouth/Throat:      Mouth: Mucous membranes are moist       Pharynx: Oropharynx is clear  No oropharyngeal exudate or posterior oropharyngeal erythema  Eyes:      General: No scleral icterus  Right eye: No discharge  Left eye: No discharge  Extraocular Movements: Extraocular movements intact  Conjunctiva/sclera: Conjunctivae normal       Pupils: Pupils are equal, round, and reactive to light  Cardiovascular:      Rate and Rhythm: Normal rate and regular rhythm  Pulses: Normal pulses  Heart sounds: Normal heart sounds  No murmur heard  No friction rub  No gallop  Pulmonary:      Effort: Pulmonary effort is normal  No respiratory distress  Breath sounds: Normal breath sounds  No stridor  No wheezing  Chest:      Chest wall: No tenderness  Abdominal:      General: Abdomen is flat  Bowel sounds are normal  There is no distension  Palpations: Abdomen is soft  There is no mass  Tenderness: There is no abdominal tenderness  Musculoskeletal:         General: Normal range of motion  Cervical back: Normal range of motion and neck supple  No rigidity or tenderness     Skin:     General: Skin is warm and dry  Capillary Refill: Capillary refill takes less than 2 seconds  Findings: Erythema and rash present  Comments: Erythematous, blanching rash with satellite projections located in the axilla bilaterally  Neurological:      General: No focal deficit present  Mental Status: He is alert and oriented to person, place, and time  Mental status is at baseline  Cranial Nerves: No cranial nerve deficit  Sensory: No sensory deficit  Motor: No weakness  Psychiatric:         Mood and Affect: Mood normal          Behavior: Behavior normal          Thought Content: Thought content normal          Judgment: Judgment normal          ED Medications  Medications - No data to display    Diagnostic Studies  Results Reviewed     None                 No orders to display         Procedures  Procedures      ED Course                             SBIRT 22yo+      Most Recent Value   SBIRT (22 yo +)   In order to provide better care to our patients, we are screening all of our patients for alcohol and drug use  Would it be okay to ask you these screening questions? Unable to answer at this time Filed at: 07/21/2021 1701                MDM  Number of Diagnoses or Management Options  Candidal dermatitis  Diagnosis management comments: Patient is a 80 yom with a pmh of aortic dissection and CKD with dialysis presented to the ED with a complaint of rash in his axilla bilaterally  On physical exam the patient is AO x 4 and in no apparent distress  His rash is erythematous and blanching with satellite projections located in the axilla bilaterally  There are no pustules or drainage noted  He is afebrile  He has no indication of rash anywhere else on his body  His rash continues to look like fungal in origin  He will be discharged with a topical ketoconazole cream and given the number for dermatology in order for him to follow up as an outpatient    He will be encouraged to try this second topical therapy for 14 days but if he has no improvement within 7 days he will need to follow up with dermatology or sooner if his symptoms worsen  He will also be encouraged to return to the ED should his rash start to spread elsewhere or he develops any other symptoms that he may find worrisome  Disposition  Final diagnoses:   Candidal dermatitis     Time reflects when diagnosis was documented in both MDM as applicable and the Disposition within this note     Time User Action Codes Description Comment    7/21/2021  5:41 PM Samuel Bound Add [R21] Rash     7/21/2021  5:41 PM Suellen Patricio Remove [R21] Rash     7/21/2021  5:42 PM Samuel Bound Add [B37 2] Candidal dermatitis       ED Disposition     ED Disposition Condition Date/Time Comment    Discharge Stable Wed Jul 21, 2021  5:39 PM [facility] discharge to home/self care              Follow-up Information     Follow up With Specialties Details Why Contact Info Additional Information    [facility] [place] [facility] Schedule an appointment as soon as possible for a visit   [address] [place] [id number] [phone] [facility] [place], [facility], [address], [address] [phone]          Discharge Medication List as of 7/21/2021  5:52 PM      START taking these medications    Details   ketoconazole (NIZORAL) 2 % cream Apply topically daily for 14 days, Starting Wed 7/21/2021, Until Wed 8/4/2021, Print         CONTINUE these medications which have NOT CHANGED    Details   acetaminophen (TYLENOL) 325 mg tablet Take 1-2 tabs q6h PRN mild fever/pain, Normal      atorvastatin (LIPITOR) 40 mg tablet TAKE 1 TABLET (40 MG TOTAL) BY MOUTH DAILY, Starting Tue 6/1/2021, Normal      b complex-vitamin C-folic acid (NEPHROCAPS) 1 mg capsule Take 1 capsule by mouth daily with dinner, Starting Wed 2/17/2021, Normal      bumetanide (BUMEX) 2 mg tablet Take 2 tablets (4 mg total) by mouth 2 (two) times a day, Starting Mon 5/3/2021, Normal      diphenhydrAMINE (BENADRYL) 25 mg tablet Take 25 mg by mouth every 6 (six) hours as needed for itching, Historical Med      docusate sodium (COLACE) 100 mg capsule TAKE 1 CAPSULE (100 MG TOTAL) BY MOUTH 2 (TWO) TIMES A DAY, Starting Mon 5/3/2021, Normal      ibuprofen (MOTRIN) 800 mg tablet Take 800 mg by mouth every 6 (six) hours, Starting Thu 5/6/2021, Historical Med      lidocaine-prilocaine (EMLA) cream Apply topically as needed for mild pain (with HD sessions) With dialysis sessions, Starting Tue 5/4/2021, Normal      metoprolol tartrate (LOPRESSOR) 25 mg tablet Take 25 mg by mouth every 12 (twelve) hours, Starting Mon 6/14/2021, Historical Med      nystatin (MYCOSTATIN) powder Apply topically 3 (three) times a day, Starting Thu 7/1/2021, Normal      potassium chloride (K-DUR,KLOR-CON) 20 mEq tablet TAKE 1 TABLET (20 MEQ TOTAL) BY MOUTH 2 (TWO) TIMES A DAY, Starting Mon 3/29/2021, Normal      warfarin (COUMADIN) 2 5 mg tablet Take as directed per coumadin clinic, Normal           No discharge procedures on file  PDMP Review       Value Time User    PDMP Reviewed  Yes 3/26/2021 11:40 AM Viktoria Officer, TRINY           ED Provider  Attending physically available and evaluated [facility]  I managed the patient along with the ED Attending      Electronically Signed by         Shahriar Phillips DO  07/21/21 0946

## 2021-07-21 NOTE — ED ATTENDING ATTESTATION
7/21/2021  I, Shae Cardoso MD, saw and evaluated the patient  I have discussed the patient with the resident/non-physician practitioner and agree with the resident's/non-physician practitioner's findings, Plan of Care, and MDM as documented in the resident's/non-physician practitioner's note, except where noted  All available labs and Radiology studies were reviewed  I was present for key portions of any procedure(s) performed by the resident/non-physician practitioner and I was immediately available to provide assistance  At this point I agree with the current assessment done in the Emergency Department  I have conducted an independent evaluation of this patient a history and physical is as follows:    79 y/o male presented with pruritic rash beginning in the left axilla about a month ago, now spreading to right side  Denies any new products including deodorant, soap, clothing  Seen by PCP and has been using nystatin for 1-2 weeks without improvement  Rash appears fungal with satellite lesions  Doubt secondary bacterial infection  Will try alternative antifungal ketoconazole and have f/u with PCP   Also referral to Dermatology if symptoms persist       ED Course         Critical Care Time  Procedures

## 2021-07-21 NOTE — DISCHARGE INSTRUCTIONS
Patient has been evaluated in the ED and it appears that his rash is fungal in nature  He has been taking Nystatin Powder TID as prescribed but his rash has not improved  He has been prescribed Ketoconazole topical cream as a second line topical therapy and has been encouraged to follow up with Dermatology on an outpatient basis  He has been given the phone number to dermatology and encouraged to follow up with them if his rash does not improve with the topical therapy within 7 days or sooner if the rash continues to worsen  He is also encouraged to return to the ED if he develops any concerning symptoms including but not limited to fever, expansion of rash beyond his axilla, chest pain, shortness of breath, swelling, abdominal pain, hematuria or hematochezia

## 2021-07-22 RX ORDER — DOCUSATE SODIUM 100 MG/1
100 CAPSULE, LIQUID FILLED ORAL 2 TIMES DAILY
Qty: 30 CAPSULE | Refills: 3 | Status: SHIPPED | OUTPATIENT
Start: 2021-07-22 | End: 2021-01-01

## 2021-07-27 PROBLEM — B37.2 CANDIDAL INTERTRIGO: Status: ACTIVE | Noted: 2021-01-01

## 2021-07-27 NOTE — PROGRESS NOTES
Assessment/Plan:    Candidal intertrigo  Rash B underarms L>R fungal in appearance without improvement on nystatin powder or ketoconazole cream   He has been on the ketoconazole for approximately 6 days now and was told that if it did not improve after 2 weeks to go to the dermatologist   I told pt to increase the ketoconazole cream to bid and he can also use the nystatin powder at night  Referral to derm provided  Diagnoses and all orders for this visit:    Candidal intertrigo  -     Ambulatory referral to Dermatology; Future          Subjective:      Patient ID: Ana Mcintosh is a 80 y o  male  Pt is a 80 y o  y/o male who is seen today for evaluation of rash  PMH of HTN, aortic dissection, afib, secondary hyperparathyroidism, RA, OA, BPH, ESRD, anemia, HLD, insomnia, monclonal gammopathy  Pt was initially seen in the office on 7/1 and rash was assessed to be suggestive of funagl infection  He was prescribed  Nystatin powder which he used tid without improvement  He then went to the ER on 7/21 for reassessment and they prescribed him ketoconazole cream which he has been using once daily since  The rash has not improved and is extremely itchy  The following portions of the patient's history were reviewed and updated as appropriate: allergies, current medications, past family history, past medical history, past social history, past surgical history and problem list     Review of Systems   Constitutional: Negative for chills and fever  Skin: Positive for rash  Objective:      /70   Pulse 100   Temp 97 6 °F (36 4 °C)   Resp 18   Ht 5' 7" (1 702 m)   Wt 78 2 kg (172 lb 6 oz)   SpO2 95%   BMI 27 00 kg/m²          Physical Exam  Vitals reviewed  Constitutional:       General: He is awake  He is not in acute distress  Appearance: Normal appearance  He is well-developed and well-groomed  He is not ill-appearing  Neck:      Vascular: No carotid bruit or JVD  Cardiovascular:      Rate and Rhythm: Normal rate  Pulmonary:      Effort: Pulmonary effort is normal    Lymphadenopathy:      Upper Body:      Right upper body: No axillary or pectoral adenopathy  Left upper body: No axillary or pectoral adenopathy  Skin:     General: Skin is warm and dry  Findings: Rash (erythematous, flat rash on the left underarm with a darker red border  , no broken skin or drainage  No warmth or tenderness  Smaller, similar erythematous patch on the R underarm) present  Neurological:      Mental Status: He is alert and oriented to person, place, and time  Psychiatric:         Attention and Perception: Attention normal          Mood and Affect: Mood normal          Speech: Speech normal          Behavior: Behavior normal  Behavior is cooperative  Thought Content:  Thought content normal          Judgment: Judgment normal

## 2021-07-27 NOTE — ASSESSMENT & PLAN NOTE
Rash B underarms L>R fungal in appearance without improvement on nystatin powder or ketoconazole cream   He has been on the ketoconazole for approximately 6 days now and was told that if it did not improve after 2 weeks to go to the dermatologist   I told pt to increase the ketoconazole cream to bid and he can also use the nystatin powder at night  Referral to derm provided

## 2021-08-24 NOTE — PROGRESS NOTES
PCP requested [facility] SW's assistance  Patient has Waiver Services  PCP received letter (unknown who letter is from as it was not signed)  Letter is requesting PCP write letter stating patient should have caregiver with him during dialysis days  Will outreach patient/patient's family to inquire if this was discussed with patient's HHA agency or   Spoke with patient's dtr Lisa Smith who is unsure who sent letter  She was not aware patient/someone else was requesting caregiver days be switched to HD days  Lisa Smith requested [facility] call patient directly  Lisa Smith states patient speaks both [place] denied need for interpretor  Attempted to outreach patient; no answer, voicemail left, and awaiting return call

## 2021-09-02 NOTE — PROGRESS NOTES
2nd outreach attempt to to patient to follow-up on letter that PCP received with request for letter to be sent to waiver  stating patient should have caregiver with him during dialysis days (unknown who sent letter to PCP office with this request and if patient discussed this request with HHA or )  No answer, voicemail left, and awaiting return call

## 2021-09-03 NOTE — BRIEF OP NOTE (RAD/CATH)
INTERVENTIONAL RADIOLOGY PROCEDURE NOTE    Date: 9/3/2021    Procedure: IR AV FISTULA/GRAFT DECLOT    Preoperative diagnosis:   1  ESRD (end stage renal disease) ([facility] [address] )         Postoperative diagnosis: Same  Surgeon: Gilda Schafer MD     Assistant: None  No qualified resident was available  Blood loss:  20 cc    Specimens:  None     Findings:  Successful left upper extremity dialysis graft declot  Complications: None immediate      Anesthesia: conscious sedation

## 2021-09-03 NOTE — DISCHARGE INSTRUCTIONS
[address] [facility] [address]     Interventional Radiology Phone Number: [phone]  WHAT YOU NEED TO KNOW:   Your arm or leg may be sore, swollen, and bruised after the procedure  This is normal and should get better in a few days  DISCHARGE INSTRUCTIONS:   Call 911 for any of the following:   · You have any of the following signs of a heart attack:    Squeezing, pressure, or pain in your chest that lasts longer than 5 minutes or returns  ¨ Discomfort or pain in your back, neck, jaw, stomach, or arm   ¨ Trouble breathing  ¨ Nausea or vomiting  ¨ Lightheadedness or a sudden cold sweat, especially with chest pain or trouble breathing  · You have any of the following signs of a stroke:    ¨ Numbness or drooping on one side of your face   ¨ Weakness in an arm or leg  ¨ Confusion or difficulty speaking  ¨ Dizziness, a severe headache, or vision loss  · You feel lightheaded, short of breath, and have chest pain  · You cough up blood  · You have trouble breathing  · You have bleeding that does not stop after 10 minutes of holding firm, direct pressure over the puncture site  Seek care immediately if:   · Blood soaks through your bandage  · Your hand or foot closest to the graft or fistula feels cold, painful, or numb  Your hand or foot closest to the graft or fistula is pale or blue  · You have trouble moving your arm or leg closest to the graft or fistula  · Your bruise suddenly gets bigger  Contact your healthcare provider if:   · You have a fever or chills  · Your puncture site is red, swollen, or draining pus  · You have nausea or are vomiting  · Your skin is itchy, swollen, or you have a rash  · You cannot feel a thrill over your graft or fistula  · You have questions or concerns about your condition or care  Medicines: You may  need any of the following:  · Acetaminophen  decreases pain  It is available without a doctor's order  Ask how much to take and how often to take it  Follow directions   Read the labels of all other medicines you are using to see if they also contain acetaminophen, or ask your doctor or pharmacist  Acetaminophen can cause liver damage if not taken correctly  Do not take more than 4 grams (4,000 milligrams) of acetaminophen in one day  · Blood thinners help prevent blood clots  Examples of blood thinners include heparin and warfarin  Clots can cause strokes, heart attacks, and death  The following are general safety guidelines to follow while you are taking a blood thinner:  ¨ Watch for bleeding and bruising while you take blood thinners  Watch for bleeding from your gums or nose  Watch for blood in your urine and bowel movements  Use a soft washcloth on your skin, and a soft toothbrush to brush your teeth  This can keep your skin and gums from bleeding  If you shave, use an electric shaver  Do not play contact sports  ¨ Tell your dentist and other healthcare providers that you take anticoagulants  Wear a bracelet or necklace that says you take this medicine  ¨ Do not start or stop any medicines unless your healthcare provider tells you to  Many medicines cannot be used with blood thinners  ¨ Tell your healthcare provider right away if you forget to take the medicine, or if you take too much  ¨ Warfarin  is a blood thinner that you may need to take  The following are things you should be aware of if you take warfarin  § Foods and medicines can affect the amount of warfarin in your blood  Do not make major changes to your diet while you take warfarin  Warfarin works best when you eat about the same amount of vitamin K every day  Vitamin K is found in green leafy vegetables and certain other foods  Ask for more information about what to eat when you are taking warfarin  § You will need to see your healthcare provider for follow-up visits when you are on warfarin  You will need regular blood tests  These tests are used to decide how much medicine you need    · Take your medicine as directed  Call your healthcare provider if you think your medicine is not helping or if you have side effects  Tell him if you are allergic to any medicine  Keep a list of the medicines, vitamins, and herbs you take  Include the amounts, and when and why you take them  Bring the list or the pill bottles to follow-up visits  Carry your medicine list with you in case of an emergency  Care for your wound as directed:  Remove the bandage in 4 to 6 hours or as directed  Wash the area once a day with soap and water  Gently pat the area dry  Self-care:   · Apply firm, steady pressure to the puncture site if it bleeds  Use a clean gauze or towel to hold pressure for 10 to 15 minutes  Call 911 if you cannot stop the bleeding or the bleeding gets heavier  · Feel for a thrill once a day or as directed  Place your index and second finger over your fistula or graft as directed  You should feel a vibration  The vibration means that blood is flowing through your graft or fistula correctly  · Rest your arm or leg as directed  Do not lift anything heavier than 5 pounds or do strenuous activity for 24 hours  · Prevent damage to your graft or fistula  Do not wear tight-fitting clothing over your graft or fistula  Do not wear tight jewelry on the arm or leg with the graft or fistula  Tell healthcare providers not to do, IVs, blood draws, and blood pressure readings in the arm with your graft or fistula  Do not allow flu shots or vaccinations in your arm with your graft or fistula  Follow up with your healthcare provider as directed:  Write down your questions so you remember to ask them during your visits  © 2016 [address] [employer] is for End User's use only and may not be sold, redistributed or otherwise used for commercial purposes  All illustrations and images included in CareNotes® are the copyrighted property of A D A M [employer] Inc  or Orion Castillo    The above information is an  only  It is not intended as medical advice for individual conditions or treatments  Talk to your doctor, nurse or pharmacist before following any medical regimen to see if it is safe and effective for you

## 2021-09-03 NOTE — SEDATION DOCUMENTATION
fistulagram completed  Sheath removed hemostasis achieved with waggle to be removed in 20-25 mins  Patient tolerated well and transferred back to [facility]  Report and care assumed by primaryRN

## 2021-09-03 NOTE — H&P
Interventional Radiology Preprocedure Note    History/Indication for procedure:   Lydia Hicks is a 80 y o  male left forearm dialysis loop graft clotted  Left successful dialysis session through the graft was 2 days ago  Relevant past medical history:    Past Medical History:   Diagnosis Date    Acute renal failure superimposed on stage 4 chronic kidney disease ([facility] ) 10/4/2020    Arthritis     BPH without urinary obstruction     CKD (chronic kidney disease), stage III ([facility])     baseline 1 6-1 7    Dialysis patient ([place] [address] )     GERD (gastroesophageal reflux disease)     Hyperlipidemia     Hypertension     MI (myocardial infarction) ([place] [address] )      Patient Active Problem List   Diagnosis    Rotator cuff tear arthropathy, right    Secondary osteoarthritis of right shoulder    Benign essential hypertension    Overweight (BMI 25 0-29  9)    Lumbar pain    Benign prostatic hyperplasia without lower urinary tract symptoms    Bradycardia    Dissection of thoracic aorta ([facility])    S/P aorta repair    Anticoagulation goal of INR 2 to 3    Hyperphosphatemia    Hypotension    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    Complication of vascular dialysis catheter    Hyperlipidemia    Sleep disorder    Encounter for post surgical wound check    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    Monoclonal gammopathy    Paroxysmal atrial fibrillation ([facility])    GONZALES (dyspnea on exertion)    Acute on chronic diastolic (congestive) heart failure ([facility])    Anemia    Chronic kidney disease-mineral and bone disorder    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 ([place] [address] )    Rheumatoid arthritis ([place] [address] )    Insomnia    ESRD (end stage renal disease) ([facility])    Status post placement of arteriovenous graft    Subconjunctival hemorrhage of right eye    Vision loss    Candidal intertrigo       /68   Pulse 105   Temp 98 1 °F (36 7 °C) (Temporal)   Resp 20   SpO2 98% Medications:    Inpatient Medications:     Scheduled Medications:      Infusions:  No current facility-administered medications for this encounter  PRN:      Outpatient Medications:  Current Outpatient Medications on File Prior to Encounter   Medication Sig Dispense Refill    ibuprofen (MOTRIN) 800 mg tablet Take 800 mg by mouth every 6 (six) hours      acetaminophen (TYLENOL) 325 mg tablet Take 1-2 tabs q6h PRN mild fever/pain (Patient not taking: Reported on 4/29/2021) 90 tablet 0    atorvastatin (LIPITOR) 40 mg tablet TAKE 1 TABLET (40 MG TOTAL) BY MOUTH DAILY 90 tablet 0    b complex-vitamin C-folic acid (NEPHROCAPS) 1 mg capsule Take 1 capsule by mouth daily with dinner 30 capsule 0    bumetanide (BUMEX) 2 mg tablet Take 2 tablets (4 mg total) by mouth 2 (two) times a day 120 tablet 5    diphenhydrAMINE (BENADRYL) 25 mg tablet Take 25 mg by mouth every 6 (six) hours as needed for itching      docusate sodium (COLACE) 100 mg capsule TAKE 1 CAPSULE (100 MG TOTAL) BY MOUTH 2 (TWO) TIMES A DAY 30 capsule 3    ketoconazole (NIZORAL) 2 % cream Apply topically daily for 14 days 15 g 0    lidocaine-prilocaine (EMLA) cream APPLY TOPICALLY AS NEEDED FOR MILD PAIN (WITH HD SESSIONS) WITH DIALYSIS SESSIONS 30 g 3    metoprolol tartrate (LOPRESSOR) 25 mg tablet TAKE 1 TABLET (25 MG TOTAL) BY MOUTH EVERY 12 (TWELVE) HOURS 60 tablet 1    nystatin (MYCOSTATIN) powder Apply topically 3 (three) times a day 45 g 0    potassium chloride (K-DUR,KLOR-CON) 20 mEq tablet TAKE 1 TABLET (20 MEQ TOTAL) BY MOUTH 2 (TWO) TIMES A DAY 60 tablet 5    warfarin (COUMADIN) 2 5 mg tablet Take as directed per coumadin clinic 60 tablet 5    [DISCONTINUED] bumetanide (BUMEX) 2 mg tablet Take 2 tablets (4 mg total) by mouth 2 (two) times a day 60 tablet 3     No current facility-administered medications on file prior to encounter         No Known Allergies    Anticoagulants: warfarin    ASA classification: ASA 2 - Patient with mild systemic disease with no functional limitations    Airway Assessment: II (hard and soft palate, upper portion of tonsils anduvula visible)    Relevant family history: None    Relevant review of systems: None    Prior sedation/anesthesia: yes    Can the patient lie flat? Yes     Does patient have sleep apnea? No    If yes, does patient use CPAP? not applicable    NPO Status: yes    Labs:   CBC with diff:   Lab Results   Component Value Date    WBC 6 11 06/09/2021    HGB 11 9 (L) 06/09/2021    HCT 35 (L) 06/09/2021    MCV 99 (H) 06/09/2021     (L) 06/09/2021    MCH 32 3 06/09/2021    MCHC 32 5 06/09/2021    RDW 14 5 06/09/2021    MPV 9 8 06/09/2021    NRBC 0 06/09/2021     BMP/CMP:  Lab Results   Component Value Date    K 5 3 06/09/2021     06/09/2021    CO2 32 06/09/2021    BUN 40 (H) 06/09/2021    CREATININE 5 39 (H) 06/09/2021    GLUCOSE 82 06/09/2021    CALCIUM 8 8 06/09/2021    AST 50 (H) 06/09/2021    ALT 25 06/09/2021    ALKPHOS 133 (H) 06/09/2021    EGFR 9 06/09/2021    EGFR 42 12/29/2017   ,     Coags:   Lab Results   Component Value Date    PTT 62 (H) 06/09/2021    INR 2 72 (H) 09/03/2021   ,   Results from last 7 days   Lab Units 09/03/21  1324   INR  2 72*        Relevant imaging studies:   None    Directed physical examination:  Left forearm dialysis loop graft with absence of thrill  Assessment/Plan:   Dialysis loop graft declot  Sedation/Anesthesia plan: Moderate sedation will be used as needed for procedure      Consent with alternatives to the procedure, risks and benefits have been explained and discussed with the patient/patient's family: yes

## 2021-09-08 NOTE — LETTER
Fecha: 09/08/21    Estimado/a Oliver SEBASTIAN Core:      [facility] [phone]  Soy un enfermero registrado administrador de atención de [address] [place] [id number]  No pude comunicarme con usted y me gustaría programar un horario para que hablemos por teléfono o personalmente  Me dedico a ayudar a los pacientes que tienen afecciones médicas complejas a obtener la atención que necesitan  [place] comprende a pacientes que pueden estar en el hospital o en patric tomasz de emergencias  Adjunto información para usted  Si tiene preguntas, no dude en llamarme  Espero paez llamado  Atentamente    Valeria Phillips                  [phone]  [address] [facility]    LEONARDO Sutton  Copia:  Valeria Schwarz y dirección del médico de atención primaria)

## 2021-09-08 NOTE — PROGRESS NOTES
No return call received from patient in response to [facility]'s attempt to assist with letter patient was requesting to state need for aide on dialysis days  Will mail unable to reach letter and will remain available to assist with any future needs  Update routed to [facility]

## 2021-09-14 NOTE — PROGRESS NOTES
FAMILY PRACTICE OFFICE VISIT       NAME: Oliver Sharpe  AGE: 80 y o  SEX: male       : 1940        MRN: [id number]    Assessment and Plan     Problem List Items Addressed This Visit        Other    Hyperlipidemia    Relevant Orders    Lipid panel      Other Visit Diagnoses     Intertrigo    -  Primary    Relevant Medications    ketoconazole (NIZORAL) 2 % cream          1  Intertrigo  ketoconazole (NIZORAL) 2 % cream   2  Hyperlipidemia, unspecified hyperlipidemia type  Lipid panel     Intertrigo:  Left axilla  Rash significantly improving  Renewed ketoconazole cream   Continue to use ketoconazole 2% cream twice daily until rash completely clears  Hyperlipidemia:  Will complete lipid panel with next blood work  Follow-up in 3 months or sooner if needed  Chief Complaint     Chief Complaint   Patient presents with    Rash     Pt is here for left armpit rash       History of Present Illness     Oliver Sharpe is a 80year old male presenting today for rash in left axilla  Rash has improved, almost resolved, with ketoconazole cream and nystatin powder  Needs refills for cream       Review of Systems   Review of Systems   Constitutional: Negative  HENT: Negative  Respiratory: Negative for cough, chest tightness, shortness of breath and wheezing  Cardiovascular: Negative for chest pain, palpitations and leg swelling  Gastrointestinal: Negative for abdominal pain, diarrhea, nausea and vomiting  Skin: Positive for rash  Neurological: Negative  Active Problem List     Patient Active Problem List   Diagnosis    Rotator cuff tear arthropathy, right    Secondary osteoarthritis of right shoulder    Benign essential hypertension    Overweight (BMI 25 0-29  9)    Lumbar pain    Benign prostatic hyperplasia without lower urinary tract symptoms    Bradycardia    Dissection of thoracic aorta (HCC)    S/P aorta repair    Anticoagulation goal of INR 2 to 3    Hyperphosphatemia  Hypotension    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    Complication of vascular dialysis catheter    Hyperlipidemia    Sleep disorder    Encounter for post surgical wound check    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)    Anemia    Chronic kidney disease-mineral and bone disorder    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 ([facility] )    [facility] ([facility] )    Insomnia    ESRD (end stage renal disease) ([facility] )    Status post placement of arteriovenous graft    Subconjunctival hemorrhage of right eye    Vision loss    Candidal intertrigo       Past Medical History:  Past Medical History:   Diagnosis Date    Acute renal failure superimposed on stage 4 chronic kidney disease ([facility] ) 10/4/2020    Arthritis     BPH without urinary obstruction     CKD (chronic kidney disease), stage III (HCC)     baseline 1 6-1 7    Dialysis patient ([place] [address] )     GERD (gastroesophageal reflux disease)     Hyperlipidemia     Hypertension     MI (myocardial infarction) ([facility]  )        Past Surgical History:  Past Surgical History:   Procedure Laterality Date    APPENDECTOMY      CARDIAC SURGERY      COLONOSCOPY  2017    FRACTURE SURGERY      IR AV FISTULA/GRAFT DECLOT  9/3/2021    IR PERITONEAL DIALYSIS CATHETER PLACEMENT  2/3/2021    IR PERITONEAL DIALYSIS CATHETER REMOVAL  2/17/2021    IR PERITONEAL DIALYSIS CATHETER REMOVAL AND PLACEMENT NEW SITE  2/9/2021    IR TEMPORARY DIALYSIS CATHETER PLACEMENT  12/23/2019    IR THORACENTESIS  12/24/2019    IR THORACENTESIS  12/27/2019    IR TUNNELED CENTRAL LINE REMOVAL  4/27/2021    IR TUNNELED DIALYSIS CATHETER CHECK/CHANGE/REPOSITION/ANGIOPLASTY  1/6/2020    IR TUNNELED DIALYSIS CATHETER PLACEMENT  1/2/2020    IR TUNNELED DIALYSIS CATHETER PLACEMENT  2/15/2021    IR TUNNELED DIALYSIS CATHETER REMOVAL  3/4/2020    MT ANASTOMOSIS,AV,ANY SITE Left 3/26/2021    Procedure: LEFT FOREARM LOOP AV GRAFT;  Surgeon: Wilfred Frankel MD;  [facility];  Service: Vascular    MT ASCEND AORTA GRAFT W ROOT REPLACMENT  VALVE CONDUIT/CORON RECONSTRUCT N/A 12/15/2019    Procedure: BENTALL PROCEDURE (ASCENDING AORTIC REPAIR) with 26mm Gelweave Graft;  Surgeon: Glen Berman DO;  [facility];  Service: Cardiac Surgery    MT RECONSTR TOTAL SHOULDER IMPLANT Right 12/5/2017    Procedure: ARTHROPLASTY SHOULDER REVERSE with OPEN CYST EXCISION;  Surgeon: Kyung Uriostegui MD;  [facility];  Service: Orthopedics    SHOULDER SURGERY      WRIST SURGERY         Family History:  Family History   Problem Relation Age of Onset    No Known Problems Mother     Hypertension Father     Arthritis Family     No Known Problems Daughter        Social History:  Social History     Socioeconomic History    Marital status: /Civil Union     Spouse name: Not on file    Number of children: Not on file    Years of education: Not on file    Highest education level: Not on file   Occupational History    Not on file   Tobacco Use    Smoking status: Never Smoker    Smokeless tobacco: Never Used   Vaping Use    Vaping Use: Never used   Substance and Sexual Activity    Alcohol use: Not Currently    Drug use: Not Currently    Sexual activity: Not Currently     Partners: Female   Other Topics Concern    Not on file   Social History Narrative    ** Merged History Encounter **         Daily caffeine consumption, 1 serving a day  Drinks coffee      Social Determinants of Health     Financial Resource Strain: Low Risk     Difficulty of Paying Living Expenses: Not hard at all   Food Insecurity: No Food Insecurity    Worried About Running Out of Food in the Last Year: Never true    Aravind of Food in the Last Year: Never true   Transportation Needs: No Transportation Needs    Lack of Transportation (Medical): No    Lack of Transportation (Non-Medical): No   Physical Activity:     Days of Exercise per Week:     Minutes of Exercise per Session:    Stress:     Feeling of Stress :    Social Connections:     Frequency of Communication with Friends and Family:     Frequency of Social Gatherings with Friends and Family:     Attends Mu-ism Services:     Active Member of Clubs or Organizations:     Attends Club or Organization Meetings:     Marital Status:    Intimate Partner Violence:     Fear of Current or Ex-Partner:     Emotionally Abused:     Physically Abused:     Sexually Abused:        I have reviewed the patient's medical history in detail; there are no changes to the history as noted in the electronic medical record  Objective     Vitals:    09/14/21 0954   BP: 90/60   Pulse: 61   Resp: 16   Temp: 98 °F (36 7 °C)   TempSrc: Temporal   SpO2: 98%   Weight: 79 6 kg (175 lb 6 4 oz)   Height: 5' 7" (1 702 m)     Wt Readings from Last 3 Encounters:   09/14/21 79 6 kg (175 lb 6 4 oz)   07/27/21 78 2 kg (172 lb 6 oz)   07/21/21 74 8 kg (165 lb)     Physical Exam  Vitals and nursing note reviewed  Constitutional:       General: He is not in acute distress  Appearance: Normal appearance  He is not ill-appearing  Cardiovascular:      Rate and Rhythm: Normal rate and regular rhythm  Heart sounds: Murmur heard  Pulmonary:      Effort: Pulmonary effort is normal  No respiratory distress  Breath sounds: Normal breath sounds  No wheezing or rales  Musculoskeletal:      Right lower leg: No edema  Left lower leg: No edema  Skin:     Findings: Rash (almost healed left axilla rash, rash is pale pink, and only in very center of axilla ) present  Neurological:      Mental Status: He is alert     Psychiatric:         Mood and Affect: Mood normal             ALLERGIES:  No Known Allergies    Current Medications     Current Outpatient Medications   Medication Sig Dispense Refill    atorvastatin (LIPITOR) 40 mg tablet TAKE 1 TABLET (40 MG TOTAL) BY MOUTH DAILY 90 tablet 0    b complex-vitamin C-folic acid (NEPHROCAPS) 1 mg capsule Take 1 capsule by mouth daily with dinner 30 capsule 0    bumetanide (BUMEX) 2 mg tablet Take 2 tablets (4 mg total) by mouth 2 (two) times a day 120 tablet 5    diphenhydrAMINE (BENADRYL) 25 mg tablet Take 25 mg by mouth every 6 (six) hours as needed for itching      docusate sodium (COLACE) 100 mg capsule TAKE 1 CAPSULE (100 MG TOTAL) BY MOUTH 2 (TWO) TIMES A DAY 30 capsule 3    lidocaine-prilocaine (EMLA) cream APPLY TOPICALLY AS NEEDED FOR MILD PAIN (WITH HD SESSIONS) WITH DIALYSIS SESSIONS 30 g 3    potassium chloride (K-DUR,KLOR-CON) 20 mEq tablet TAKE 1 TABLET (20 MEQ TOTAL) BY MOUTH 2 (TWO) TIMES A DAY 60 tablet 5    ketoconazole (NIZORAL) 2 % cream Apply thin layer twice daily to affected area 30 g 0    methylPREDNISolone 4 MG tablet therapy pack Use as directed on package 21 tablet 0    metoprolol tartrate (LOPRESSOR) 25 mg tablet TAKE 1 TABLET (25 MG TOTAL) BY MOUTH EVERY 12 (TWELVE) HOURS (Patient not taking: Reported on 9/14/2021) 60 tablet 1    warfarin (COUMADIN) 2 5 mg tablet TAKE AS DIRECTED PER COUMADIN CLINIC 60 tablet 5     No current facility-administered medications for this visit           Health Maintenance     Health Maintenance   Topic Date Due    Influenza Vaccine (1) 09/01/2021    BMI: Followup Plan  01/17/2022    Fall Risk  01/07/2022    Medicare Annual Wellness Visit (AWV)  01/07/2022    Depression Screening  07/27/2022    BMI: Adult  09/14/2022    DTaP,Tdap,and Td Vaccines (2 - Td or Tdap) 05/01/2029    Hepatitis C Screening  Completed    Pneumococcal Vaccine: 65+ Years  Completed    COVID-19 Vaccine  Completed    HIB Vaccine  Aged Out    Hepatitis B Vaccine  Aged Out    IPV Vaccine  Aged Out    Hepatitis A Vaccine  Aged Out    Meningococcal ACWY Vaccine  Aged Out    HPV Vaccine  Aged Out Immunization History   Administered Date(s) Administered    INFLUENZA 10/16/2014, 09/02/2015, 08/15/2016, 09/15/2018    Influenza Quadrivalent Preservative Free 3 years and older IM 10/16/2014    Influenza Split High Dose Preservative Free IM 01/20/2015    Influenza, high dose seasonal 0 7 mL 10/28/2020    Pneumococcal Conjugate 13-Valent 04/17/2019    Pneumococcal Polysaccharide PPV23 01/07/2021    SARS-CoV-2 / COVID-19 mRNA IM (Pfizer-BioNTech) 03/31/2021, 04/21/2021    Tdap 05/01/2019    Tuberculin Skin Test-PPD Intradermal 01/09/2020, 02/22/2021    Zoster 01/20/2015    Zoster Vaccine Recombinant 09/15/2018, 04/17/2019    influenza, trivalent, adjuvanted 08/28/2019       EL Malloy

## 2021-09-17 NOTE — ED PROVIDER NOTES
History  Chief Complaint   Patient presents with    Shoulder Pain     Sharp Left should pain x 5 days 8/10; Fistual Left arm; hx of arthritis     Patient is a 47-year-old male with a history of hypertension, hyperlipidemia and end-stage renal disease who presents with left shoulder pain  Patient states that his left shoulder has been hurting for the past 5 days  He denies any injury or direct trauma  His pain is been gradually worsening with limited range of motion  He notes that they gave him Tylenol at dialysis today and he had no significant improvement in pain  He does admit to a history of arthritis but has never had pain in his left shoulder  He denies fever, chills, vomiting or other concerns  History provided by:  Patient  Shoulder Pain  Location:  Shoulder  Shoulder location:  L shoulder  Injury: no    Pain details:     Radiates to:  Does not radiate    Severity:  Moderate    Duration:  5 days    Timing:  Constant    Progression:  Worsening  Dislocation: no    Ineffective treatments:  Acetaminophen  Associated symptoms: decreased range of motion and swelling    Associated symptoms: no back pain, no fever, no neck pain, no numbness and no tingling        Prior to Admission Medications   Prescriptions Last Dose Informant Patient Reported?  Taking?   atorvastatin (LIPITOR) 40 mg tablet   No No   Sig: TAKE 1 TABLET (40 MG TOTAL) BY MOUTH DAILY   b complex-vitamin C-folic acid (NEPHROCAPS) 1 mg capsule  Self No No   Sig: Take 1 capsule by mouth daily with dinner   bumetanide (BUMEX) 2 mg tablet   No No   Sig: Take 2 tablets (4 mg total) by mouth 2 (two) times a day   diphenhydrAMINE (BENADRYL) 25 mg tablet  Self Yes No   Sig: Take 25 mg by mouth every 6 (six) hours as needed for itching   docusate sodium (COLACE) 100 mg capsule   No No   Sig: TAKE 1 CAPSULE (100 MG TOTAL) BY MOUTH 2 (TWO) TIMES A DAY   ketoconazole (NIZORAL) 2 % cream   No No   Sig: Apply thin layer twice daily to affected area lidocaine-prilocaine (EMLA) cream   No No   Sig: APPLY TOPICALLY AS NEEDED FOR MILD PAIN (WITH HD SESSIONS) WITH DIALYSIS SESSIONS   metoprolol tartrate (LOPRESSOR) 25 mg tablet   No No   Sig: TAKE 1 TABLET (25 MG TOTAL) BY MOUTH EVERY 12 (TWELVE) HOURS   Patient not taking: Reported on 9/14/2021   potassium chloride (K-DUR,KLOR-CON) 20 mEq tablet   No No   Sig: TAKE 1 TABLET (20 MEQ TOTAL) BY MOUTH 2 (TWO) TIMES A DAY   warfarin (COUMADIN) 2 5 mg tablet   No No   Sig: TAKE AS DIRECTED PER COUMADIN CLINIC      Facility-Administered Medications: None       Past Medical History:   Diagnosis Date    Acute renal failure superimposed on stage 4 chronic kidney disease ([place] [address] ) 10/4/2020    Arthritis     BPH without urinary obstruction     CKD (chronic kidney disease), stage III (HCC)     baseline 1 6-1 7    Dialysis patient ([place] [address] )     GERD (gastroesophageal reflux disease)     Hyperlipidemia     Hypertension     MI (myocardial infarction) ([place] [address] )        Past Surgical History:   Procedure Laterality Date    APPENDECTOMY      CARDIAC SURGERY      COLONOSCOPY  2017    FRACTURE SURGERY      IR AV FISTULA/GRAFT DECLOT  9/3/2021    IR PERITONEAL DIALYSIS CATHETER PLACEMENT  2/3/2021    IR PERITONEAL DIALYSIS CATHETER REMOVAL  2/17/2021    IR PERITONEAL DIALYSIS CATHETER REMOVAL AND PLACEMENT NEW SITE  2/9/2021    IR TEMPORARY DIALYSIS CATHETER PLACEMENT  12/23/2019    IR THORACENTESIS  12/24/2019    IR THORACENTESIS  12/27/2019    IR TUNNELED CENTRAL LINE REMOVAL  4/27/2021    IR TUNNELED DIALYSIS CATHETER CHECK/CHANGE/REPOSITION/ANGIOPLASTY  1/6/2020    IR TUNNELED DIALYSIS CATHETER PLACEMENT  1/2/2020    IR TUNNELED DIALYSIS CATHETER PLACEMENT  2/15/2021    IR TUNNELED DIALYSIS CATHETER REMOVAL  3/4/2020    VA ANASTOMOSIS,AV,ANY SITE Left 3/26/2021    Procedure: LEFT FOREARM LOOP AV GRAFT;  Surgeon: Hilton Harada, MD;  [facility];  Service: Vascular    VA ASCEND AORTA GRAFT W ROOT REPLACMENT  VALVE CONDUIT/CORON RECONSTRUCT N/A 12/15/2019    Procedure: BENTALL PROCEDURE (ASCENDING AORTIC REPAIR) with 26mm Gelweave Graft;  Surgeon: Diana Sweet DO;  [facility];  Service: Cardiac Surgery    VA RECONSTR TOTAL SHOULDER IMPLANT Right 12/5/2017    Procedure: ARTHROPLASTY SHOULDER REVERSE with OPEN CYST EXCISION;  Surgeon: Melissa Holley MD;  [facility];  Service: Orthopedics    SHOULDER SURGERY      WRIST SURGERY         Family History   Problem Relation Age of Onset    No Known Problems Mother     Hypertension Father     Arthritis Family     No Known Problems Daughter      I have reviewed and agree with the history as documented  E-Cigarette/Vaping    E-Cigarette Use Never User      E-Cigarette/Vaping Substances    Nicotine No     THC No     CBD No     Flavoring No      Social History     Tobacco Use    Smoking status: Never Smoker    Smokeless tobacco: Never Used   Vaping Use    Vaping Use: Never used   Substance Use Topics    Alcohol use: Not Currently    Drug use: Not Currently       Review of Systems   Constitutional: Negative for chills, diaphoresis and fever  HENT: Negative for nosebleeds, sore throat and trouble swallowing  Eyes: Negative for photophobia, pain and visual disturbance  Respiratory: Negative for cough, chest tightness and shortness of breath  Cardiovascular: Negative for chest pain, palpitations and leg swelling  Gastrointestinal: Negative for abdominal pain, constipation, diarrhea, nausea and vomiting  Endocrine: Negative for polydipsia and polyuria  Genitourinary: Negative for difficulty urinating, dysuria and hematuria  Musculoskeletal: Positive for arthralgias  Negative for back pain, neck pain and neck stiffness  Skin: Negative for pallor and rash  Neurological: Negative for dizziness, syncope, light-headedness and headaches  All other systems reviewed and are negative        Physical Exam  Physical Exam  Vitals and nursing note reviewed  Constitutional:       General: He is not in acute distress  Appearance: He is well-developed  HENT:      Head: Normocephalic and atraumatic  Eyes:      Pupils: Pupils are equal, round, and reactive to light  Cardiovascular:      Rate and Rhythm: Normal rate and regular rhythm  Pulses: Normal pulses  Heart sounds: Normal heart sounds  Pulmonary:      Effort: Pulmonary effort is normal  No respiratory distress  Breath sounds: Normal breath sounds  Abdominal:      General: There is no distension  Palpations: Abdomen is soft  Abdomen is not rigid  Tenderness: There is no abdominal tenderness  There is no guarding or rebound  Musculoskeletal:         General: No tenderness  Left shoulder: Effusion present  Decreased range of motion (secondary to pain and swelling)  Cervical back: Normal range of motion and neck supple  Lymphadenopathy:      Cervical: No cervical adenopathy  Skin:     General: Skin is warm and dry  Capillary Refill: Capillary refill takes less than 2 seconds  Neurological:      Mental Status: He is alert and oriented to person, place, and time  Cranial Nerves: No cranial nerve deficit  Sensory: No sensory deficit           Vital Signs  ED Triage Vitals [09/17/21 1205]   Temperature Pulse Respirations Blood Pressure SpO2   98 °F (36 7 °C) 72 20 120/56 97 %      Temp Source Heart Rate Source Patient Position - Orthostatic VS BP Location FiO2 (%)   Oral Monitor Sitting Right arm --      Pain Score       8           Vitals:    09/17/21 1205   BP: 120/56   Pulse: 72   Patient Position - Orthostatic VS: Sitting         Visual Acuity      ED Medications  Medications   lidocaine (PF) (XYLOCAINE-MPF) 1 % injection 5 mL (5 mL Infiltration Given by Other 9/17/21 1341)   lidocaine (PF) (XYLOCAINE-MPF) 1 % injection 5 mL (5 mL Infiltration Given by Other 9/17/21 1410)       Diagnostic Studies  Results Reviewed     None                 XR shoulder 2+ views LEFT   Final Result by Emerson Lance MD (09/17 1410)      Left shoulder arthritis and changes suggesting rotator cuff pathology      No acute fracture or dislocation      Workstation performed: [id number]                    Procedures  Procedures         ED Course  ED Course as of Sep 18 0824   Fri Sep 17, 2021   1425 Attempted shoulder arthrocentesis x 1  Minimal blood and synovial fluid aspirated  No further attempts made  Will treat with steroid taper for likely inflammatory arthritis  Patient is unable to take NSAIDs given history of ESRD                                              MDM  Number of Diagnoses or Management Options  Left shoulder pain: new and requires workup  Diagnosis management comments: Patient presents with left shoulder pain and swelling  I attempted arthrocentesis for diagnostic and therapeutic purposes  However I was only able to aspirate a small amount of blood and synovial fluid  After discussion of risks and benefits, no further attempts at joint aspiration made  I have a low suspicion for septic arthritis  There was no trauma  I suspect inflammatory arthritis  Patient is unable to take NSAIDs  Will treat with a short course of steroids  Advised the patient return to ED if his symptoms worsen or he develops fever, chills or other concerns  Portions of the above record have been created with voice recognition software   Occasional wrong word or "sound alike" substitutions may have occurred due to the inherent limitations of voice recognition software   Read the chart carefully and recognize, using context, where substitutions may have occurred           Amount and/or Complexity of Data Reviewed  Tests in the radiology section of CPT®: ordered and reviewed  Review and summarize past medical records: yes  Independent visualization of images, tracings, or specimens: yes    Risk of Complications, Morbidity, and/or Mortality  Presenting problems: high  Diagnostic procedures: high  Management options: moderate    Patient Progress  Patient progress: stable      Disposition  Final diagnoses:   Left shoulder pain     Time reflects when diagnosis was documented in both MDM as applicable and the Disposition within this note     Time User Action Codes Description Comment    9/17/2021  2:15 PM Mayte Rojas [[id number]] Left shoulder pain       ED Disposition     ED Disposition Condition Date/Time Comment    Discharge Stable Fri Sep 17, 2021  2:15 PM [facility] Core discharge to home/self care              Follow-up Information     Follow up With Specialties Details Why Contact Info Additional Information    Mahsa Sommers Links, [address], Nurse Practitioner Schedule an appointment as soon as possible for a visit  Return to ED sooner if symptoms worsen or persist  [address] [facility] [place] [address]       [address] [facility] [place] [facility] Schedule an appointment as soon as possible for a visit   [address] [facility] 408 [phone] [address] [facility] [place], [address], [address] [place], [place], [facility]          Discharge Medication List as of 9/17/2021  2:18 PM      START taking these medications    Details   methylPREDNISolone 4 MG tablet therapy pack Use as directed on package, Normal         CONTINUE these medications which have NOT CHANGED    Details   atorvastatin (LIPITOR) 40 mg tablet TAKE 1 TABLET (40 MG TOTAL) BY MOUTH DAILY, Starting Thu 9/2/2021, Normal      b complex-vitamin C-folic acid (NEPHROCAPS) 1 mg capsule Take 1 capsule by mouth daily with dinner, Starting Wed 2/17/2021, Normal      bumetanide (BUMEX) 2 mg tablet Take 2 tablets (4 mg total) by mouth 2 (two) times a day, Starting Tue 7/27/2021, Normal      diphenhydrAMINE (BENADRYL) 25 mg tablet Take 25 mg by mouth every 6 (six) hours as needed for itching, Historical Med      docusate sodium (COLACE) 100 mg capsule TAKE 1 CAPSULE (100 MG TOTAL) BY MOUTH 2 (TWO) TIMES A DAY, Starting Thu 7/22/2021, Normal      ketoconazole (NIZORAL) 2 % cream Apply thin layer twice daily to affected area, Normal      lidocaine-prilocaine (EMLA) cream APPLY TOPICALLY AS NEEDED FOR MILD PAIN (WITH HD SESSIONS) WITH DIALYSIS SESSIONS, Starting Fri 8/6/2021, Normal      metoprolol tartrate (LOPRESSOR) 25 mg tablet TAKE 1 TABLET (25 MG TOTAL) BY MOUTH EVERY 12 (TWELVE) HOURS, Normal      potassium chloride (K-DUR,KLOR-CON) 20 mEq tablet TAKE 1 TABLET (20 MEQ TOTAL) BY MOUTH 2 (TWO) TIMES A DAY, Starting Tue 8/17/2021, Normal      warfarin (COUMADIN) 2 5 mg tablet TAKE AS DIRECTED PER COUMADIN CLINIC, Normal           No discharge procedures on file      PDMP Review       Value Time User    PDMP Reviewed  Yes 3/26/2021 11:40 AM Eugeniehaylie Sawyer PA-C          ED Provider  Electronically Signed by           Mary Rodríguez DO  09/18/21 [id number]

## 2021-09-22 NOTE — TELEPHONE ENCOUNTER
----- Message from [address] [facility] sent at 9/21/2021  8:14 PM EDT -----  Cholesterol is good  Continue cholesterol medicine, atorvastatin

## 2021-12-17 PROBLEM — K92.2 GI BLEED: Status: ACTIVE | Noted: 2021-01-01

## 2021-12-17 PROBLEM — D50.0 BLOOD LOSS ANEMIA: Status: ACTIVE | Noted: 2021-01-01

## 2021-12-17 PROBLEM — D64.9 ANEMIA: Status: ACTIVE | Noted: 2021-01-01

## 2021-12-17 PROBLEM — R79.1 SUPRATHERAPEUTIC INR: Status: ACTIVE | Noted: 2021-01-01

## 2021-12-17 PROBLEM — I50.32 CHRONIC DIASTOLIC CHF (CONGESTIVE HEART FAILURE) (HCC): Status: ACTIVE | Noted: 2021-01-01

## 2021-12-22 PROBLEM — D68.9 COAGULOPATHY (HCC): Status: ACTIVE | Noted: 2021-01-01

## 2021-12-27 NOTE — PROGRESS NOTES
[facility]  coding opportunities     Provider used I11 0     Number of diagnosis code(s) already on the problem list added to FYI fla     Chart Reviewed * (Number of) Inbasket suggestions sent to Provider: 1            Number of suggestions used: 1      Number of suggestions NOT actually used: 1        Visit status: Patient arrived for their scheduled appointment     Provider never responded to [employer]  coding request     [facility]  coding opportunities     DX: I11 0 Hypertensive heart disease with congestive heart failure  ICD 10 combination code for patient with hypertension with congestive heart failure       DX: N25 81 Secondary hyperparathyroidism of      renal origin- on problem list       Number of diagnosis code(s) already on the problem list added to FYI fla     Chart Reviewed * (Number of) Inbasket suggestions sent to Provider: 1

## 2021-12-30 PROBLEM — I11.0 HYPERTENSIVE HEART DISEASE WITH HEART FAILURE (HCC): Status: ACTIVE | Noted: 2021-01-01

## 2022-01-01 ENCOUNTER — PATIENT OUTREACH (OUTPATIENT)
Dept: FAMILY MEDICINE CLINIC | Facility: CLINIC | Age: 82
End: 2022-01-01

## 2022-01-01 ENCOUNTER — HOME CARE VISIT (OUTPATIENT)
Dept: HOME HEALTH SERVICES | Facility: HOME HEALTHCARE | Age: 82
End: 2022-01-01
Payer: COMMERCIAL

## 2022-01-01 ENCOUNTER — HOME CARE VISIT (OUTPATIENT)
Dept: HOME HEALTH SERVICES | Facility: HOME HEALTHCARE | Age: 82
End: 2022-01-01

## 2022-01-01 ENCOUNTER — HOSPITAL ENCOUNTER (INPATIENT)
Facility: HOSPITAL | Age: 82
LOS: 3 days | Discharge: HOME HEALTH | DRG: 441 | End: 2022-05-13
Attending: EMERGENCY MEDICINE | Admitting: INTERNAL MEDICINE
Payer: COMMERCIAL

## 2022-01-01 ENCOUNTER — TRANSITIONAL CARE MANAGEMENT (OUTPATIENT)
Dept: FAMILY MEDICINE CLINIC | Facility: CLINIC | Age: 82
End: 2022-01-01

## 2022-01-01 ENCOUNTER — TELEPHONE (OUTPATIENT)
Dept: CARDIOLOGY CLINIC | Facility: CLINIC | Age: 82
End: 2022-01-01

## 2022-01-01 ENCOUNTER — ANTICOAG VISIT (OUTPATIENT)
Dept: CARDIOLOGY CLINIC | Facility: CLINIC | Age: 82
End: 2022-01-01

## 2022-01-01 ENCOUNTER — OFFICE VISIT (OUTPATIENT)
Dept: CARDIOLOGY CLINIC | Facility: CLINIC | Age: 82
End: 2022-01-01
Payer: COMMERCIAL

## 2022-01-01 ENCOUNTER — APPOINTMENT (INPATIENT)
Dept: DIALYSIS | Facility: HOSPITAL | Age: 82
DRG: 308 | End: 2022-01-01
Payer: COMMERCIAL

## 2022-01-01 ENCOUNTER — TELEPHONE (OUTPATIENT)
Dept: FAMILY MEDICINE CLINIC | Facility: CLINIC | Age: 82
End: 2022-01-01

## 2022-01-01 ENCOUNTER — PREP FOR PROCEDURE (OUTPATIENT)
Dept: INTERVENTIONAL RADIOLOGY/VASCULAR | Facility: CLINIC | Age: 82
End: 2022-01-01

## 2022-01-01 ENCOUNTER — NURSE TRIAGE (OUTPATIENT)
Dept: OTHER | Facility: OTHER | Age: 82
End: 2022-01-01

## 2022-01-01 ENCOUNTER — HOSPITAL ENCOUNTER (INPATIENT)
Facility: HOSPITAL | Age: 82
LOS: 2 days | Discharge: HOME | DRG: 441 | End: 2022-07-21
Attending: EMERGENCY MEDICINE | Admitting: INTERNAL MEDICINE
Payer: COMMERCIAL

## 2022-01-01 ENCOUNTER — OFFICE VISIT (OUTPATIENT)
Dept: FAMILY MEDICINE CLINIC | Facility: CLINIC | Age: 82
End: 2022-01-01
Payer: COMMERCIAL

## 2022-01-01 ENCOUNTER — APPOINTMENT (OUTPATIENT)
Dept: LAB | Facility: CLINIC | Age: 82
End: 2022-01-01
Payer: COMMERCIAL

## 2022-01-01 ENCOUNTER — HOSPITAL ENCOUNTER (OUTPATIENT)
Dept: INFUSION CENTER | Facility: HOSPITAL | Age: 82
Discharge: HOME | End: 2022-07-19
Attending: INTERNAL MEDICINE

## 2022-01-01 ENCOUNTER — APPOINTMENT (INPATIENT)
Dept: RADIOLOGY | Facility: HOSPITAL | Age: 82
DRG: 441 | End: 2022-01-01
Payer: COMMERCIAL

## 2022-01-01 ENCOUNTER — LAB REQUISITION (OUTPATIENT)
Dept: LAB | Facility: HOSPITAL | Age: 82
End: 2022-01-01
Payer: COMMERCIAL

## 2022-01-01 ENCOUNTER — APPOINTMENT (EMERGENCY)
Dept: DIALYSIS | Facility: HOSPITAL | Age: 82
DRG: 441 | End: 2022-01-01
Payer: COMMERCIAL

## 2022-01-01 ENCOUNTER — HOSPITAL ENCOUNTER (EMERGENCY)
Facility: HOSPITAL | Age: 82
Discharge: HOME | End: 2022-04-18
Attending: EMERGENCY MEDICINE
Payer: COMMERCIAL

## 2022-01-01 ENCOUNTER — RA CDI HCC (OUTPATIENT)
Dept: OTHER | Facility: HOSPITAL | Age: 82
End: 2022-01-01

## 2022-01-01 ENCOUNTER — TELEPHONE (OUTPATIENT)
Dept: PALLIATIVE MEDICINE | Facility: CLINIC | Age: 82
End: 2022-01-01

## 2022-01-01 ENCOUNTER — HOSPITAL ENCOUNTER (INPATIENT)
Facility: HOSPITAL | Age: 82
LOS: 3 days | Discharge: HOME | DRG: 441 | End: 2022-06-04
Attending: EMERGENCY MEDICINE | Admitting: INTERNAL MEDICINE
Payer: COMMERCIAL

## 2022-01-01 ENCOUNTER — APPOINTMENT (INPATIENT)
Dept: NON INVASIVE DIAGNOSTICS | Facility: HOSPITAL | Age: 82
DRG: 441 | End: 2022-01-01
Payer: COMMERCIAL

## 2022-01-01 ENCOUNTER — HOSPITAL ENCOUNTER (OUTPATIENT)
Dept: RADIOLOGY | Facility: HOSPITAL | Age: 82
Discharge: HOME | End: 2022-01-07
Attending: RADIOLOGY | Admitting: RADIOLOGY
Payer: COMMERCIAL

## 2022-01-01 ENCOUNTER — HOSPITAL ENCOUNTER (INPATIENT)
Facility: HOSPITAL | Age: 82
LOS: 2 days | Discharge: HOME | DRG: 308 | End: 2022-05-04
Attending: EMERGENCY MEDICINE | Admitting: INTERNAL MEDICINE
Payer: COMMERCIAL

## 2022-01-01 ENCOUNTER — CONSULT (OUTPATIENT)
Dept: PALLIATIVE MEDICINE | Facility: CLINIC | Age: 82
End: 2022-01-01
Payer: COMMERCIAL

## 2022-01-01 ENCOUNTER — APPOINTMENT (INPATIENT)
Dept: DIALYSIS | Facility: HOSPITAL | Age: 82
DRG: 441 | End: 2022-01-01
Payer: COMMERCIAL

## 2022-01-01 ENCOUNTER — APPOINTMENT (INPATIENT)
Dept: MRI IMAGING | Facility: HOSPITAL | Age: 82
DRG: 441 | End: 2022-01-01
Payer: COMMERCIAL

## 2022-01-01 ENCOUNTER — TELEPHONE (OUTPATIENT)
Dept: SURGICAL ONCOLOGY | Facility: CLINIC | Age: 82
End: 2022-01-01

## 2022-01-01 ENCOUNTER — APPOINTMENT (INPATIENT)
Dept: ULTRASOUND IMAGING | Facility: HOSPITAL | Age: 82
DRG: 441 | End: 2022-01-01
Payer: COMMERCIAL

## 2022-01-01 ENCOUNTER — TELEPHONE (OUTPATIENT)
Dept: NEPHROLOGY | Facility: CLINIC | Age: 82
End: 2022-01-01

## 2022-01-01 ENCOUNTER — TELEPHONE (OUTPATIENT)
Dept: INFUSION CENTER | Facility: HOSPITAL | Age: 82
End: 2022-01-01

## 2022-01-01 ENCOUNTER — APPOINTMENT (INPATIENT)
Dept: CT IMAGING | Facility: HOSPITAL | Age: 82
DRG: 441 | End: 2022-01-01
Payer: COMMERCIAL

## 2022-01-01 ENCOUNTER — APPOINTMENT (EMERGENCY)
Dept: RADIOLOGY | Facility: HOSPITAL | Age: 82
DRG: 441 | End: 2022-01-01
Payer: COMMERCIAL

## 2022-01-01 ENCOUNTER — APPOINTMENT (INPATIENT)
Dept: DIALYSIS | Facility: HOSPITAL | Age: 82
DRG: 441 | End: 2022-01-01
Attending: INTERNAL MEDICINE
Payer: COMMERCIAL

## 2022-01-01 ENCOUNTER — HOME CARE VISIT (OUTPATIENT)
Dept: HOME HOSPICE | Facility: HOSPICE | Age: 82
End: 2022-01-01

## 2022-01-01 ENCOUNTER — CONSULT (OUTPATIENT)
Dept: HEMATOLOGY ONCOLOGY | Facility: CLINIC | Age: 82
End: 2022-01-01
Payer: COMMERCIAL

## 2022-01-01 ENCOUNTER — HOME HEALTH ADMISSION (OUTPATIENT)
Dept: HOME HEALTH SERVICES | Facility: HOME HEALTHCARE | Age: 82
End: 2022-01-01
Payer: COMMERCIAL

## 2022-01-01 ENCOUNTER — TELEPHONE (OUTPATIENT)
Dept: HEMATOLOGY ONCOLOGY | Facility: CLINIC | Age: 82
End: 2022-01-01

## 2022-01-01 ENCOUNTER — APPOINTMENT (EMERGENCY)
Dept: RADIOLOGY | Facility: HOSPITAL | Age: 82
DRG: 308 | End: 2022-01-01
Payer: COMMERCIAL

## 2022-01-01 VITALS
HEART RATE: 85 BPM | OXYGEN SATURATION: 92 % | RESPIRATION RATE: 18 BRPM | WEIGHT: 191.8 LBS | TEMPERATURE: 98.3 F | BODY MASS INDEX: 30.1 KG/M2 | HEIGHT: 67 IN | SYSTOLIC BLOOD PRESSURE: 90 MMHG | DIASTOLIC BLOOD PRESSURE: 51 MMHG

## 2022-01-01 VITALS
DIASTOLIC BLOOD PRESSURE: 54 MMHG | TEMPERATURE: 97.6 F | SYSTOLIC BLOOD PRESSURE: 90 MMHG | OXYGEN SATURATION: 93 % | HEART RATE: 144 BPM | BODY MASS INDEX: 30.11 KG/M2 | WEIGHT: 192.24 LBS

## 2022-01-01 VITALS
RESPIRATION RATE: 18 BRPM | DIASTOLIC BLOOD PRESSURE: 54 MMHG | OXYGEN SATURATION: 98 % | TEMPERATURE: 98.3 F | HEART RATE: 78 BPM | SYSTOLIC BLOOD PRESSURE: 92 MMHG

## 2022-01-01 VITALS
HEART RATE: 65 BPM | OXYGEN SATURATION: 100 % | RESPIRATION RATE: 16 BRPM | DIASTOLIC BLOOD PRESSURE: 60 MMHG | SYSTOLIC BLOOD PRESSURE: 120 MMHG | HEIGHT: 67 IN | BODY MASS INDEX: 29.03 KG/M2 | TEMPERATURE: 97.8 F | WEIGHT: 185 LBS

## 2022-01-01 VITALS
RESPIRATION RATE: 16 BRPM | TEMPERATURE: 97.6 F | SYSTOLIC BLOOD PRESSURE: 102 MMHG | HEART RATE: 77 BPM | DIASTOLIC BLOOD PRESSURE: 53 MMHG | WEIGHT: 177.47 LBS | BODY MASS INDEX: 27.85 KG/M2 | HEIGHT: 67 IN | OXYGEN SATURATION: 100 %

## 2022-01-01 VITALS
HEIGHT: 68 IN | HEART RATE: 93 BPM | SYSTOLIC BLOOD PRESSURE: 97 MMHG | TEMPERATURE: 97.5 F | OXYGEN SATURATION: 100 % | DIASTOLIC BLOOD PRESSURE: 60 MMHG | BODY MASS INDEX: 26.37 KG/M2 | WEIGHT: 174 LBS | RESPIRATION RATE: 17 BRPM

## 2022-01-01 VITALS
TEMPERATURE: 98.4 F | SYSTOLIC BLOOD PRESSURE: 100 MMHG | WEIGHT: 180.5 LBS | HEART RATE: 100 BPM | DIASTOLIC BLOOD PRESSURE: 60 MMHG | HEIGHT: 68 IN | RESPIRATION RATE: 18 BRPM | OXYGEN SATURATION: 95 % | BODY MASS INDEX: 27.35 KG/M2

## 2022-01-01 VITALS
OXYGEN SATURATION: 94 % | HEART RATE: 72 BPM | SYSTOLIC BLOOD PRESSURE: 100 MMHG | RESPIRATION RATE: 20 BRPM | DIASTOLIC BLOOD PRESSURE: 60 MMHG | HEIGHT: 69 IN | WEIGHT: 180 LBS | BODY MASS INDEX: 26.66 KG/M2

## 2022-01-01 VITALS
BODY MASS INDEX: 30.21 KG/M2 | DIASTOLIC BLOOD PRESSURE: 42 MMHG | SYSTOLIC BLOOD PRESSURE: 82 MMHG | HEIGHT: 67 IN | OXYGEN SATURATION: 92 % | WEIGHT: 192.5 LBS | HEART RATE: 76 BPM

## 2022-01-01 VITALS
DIASTOLIC BLOOD PRESSURE: 80 MMHG | HEART RATE: 91 BPM | WEIGHT: 178.5 LBS | HEIGHT: 68 IN | TEMPERATURE: 97.7 F | BODY MASS INDEX: 27.05 KG/M2 | SYSTOLIC BLOOD PRESSURE: 120 MMHG | RESPIRATION RATE: 17 BRPM | OXYGEN SATURATION: 97 %

## 2022-01-01 VITALS
DIASTOLIC BLOOD PRESSURE: 70 MMHG | WEIGHT: 178.9 LBS | HEIGHT: 68 IN | OXYGEN SATURATION: 99 % | BODY MASS INDEX: 27.11 KG/M2 | HEART RATE: 86 BPM | SYSTOLIC BLOOD PRESSURE: 108 MMHG

## 2022-01-01 VITALS
SYSTOLIC BLOOD PRESSURE: 100 MMHG | TEMPERATURE: 96.6 F | OXYGEN SATURATION: 95 % | RESPIRATION RATE: 20 BRPM | DIASTOLIC BLOOD PRESSURE: 60 MMHG | HEART RATE: 72 BPM

## 2022-01-01 VITALS
OXYGEN SATURATION: 99 % | BODY MASS INDEX: 27.28 KG/M2 | HEART RATE: 91 BPM | TEMPERATURE: 98 F | SYSTOLIC BLOOD PRESSURE: 110 MMHG | WEIGHT: 180 LBS | DIASTOLIC BLOOD PRESSURE: 70 MMHG | RESPIRATION RATE: 16 BRPM | HEIGHT: 68 IN

## 2022-01-01 VITALS
DIASTOLIC BLOOD PRESSURE: 53 MMHG | HEART RATE: 68 BPM | BODY MASS INDEX: 27.85 KG/M2 | RESPIRATION RATE: 16 BRPM | SYSTOLIC BLOOD PRESSURE: 97 MMHG | TEMPERATURE: 96 F | OXYGEN SATURATION: 100 % | WEIGHT: 177.47 LBS | HEIGHT: 67 IN

## 2022-01-01 VITALS
WEIGHT: 191.36 LBS | TEMPERATURE: 97.5 F | SYSTOLIC BLOOD PRESSURE: 88 MMHG | OXYGEN SATURATION: 98 % | BODY MASS INDEX: 30.03 KG/M2 | HEART RATE: 63 BPM | DIASTOLIC BLOOD PRESSURE: 54 MMHG | RESPIRATION RATE: 18 BRPM | HEIGHT: 67 IN

## 2022-01-01 DIAGNOSIS — I10 BENIGN ESSENTIAL HYPERTENSION: ICD-10-CM

## 2022-01-01 DIAGNOSIS — K74.69 OTHER CIRRHOSIS OF LIVER (HCC): ICD-10-CM

## 2022-01-01 DIAGNOSIS — N17.9 AKI (ACUTE KIDNEY INJURY) (HCC): ICD-10-CM

## 2022-01-01 DIAGNOSIS — N17.9 ACUTE KIDNEY INJURY SUPERIMPOSED ON CHRONIC KIDNEY DISEASE (HCC): ICD-10-CM

## 2022-01-01 DIAGNOSIS — N18.9 ACUTE KIDNEY INJURY SUPERIMPOSED ON CHRONIC KIDNEY DISEASE (HCC): ICD-10-CM

## 2022-01-01 DIAGNOSIS — K92.2 GASTROINTESTINAL HEMORRHAGE, UNSPECIFIED GASTROINTESTINAL HEMORRHAGE TYPE: ICD-10-CM

## 2022-01-01 DIAGNOSIS — K74.60 HEPATIC CIRRHOSIS, UNSPECIFIED HEPATIC CIRRHOSIS TYPE, UNSPECIFIED WHETHER ASCITES PRESENT (HCC): Primary | ICD-10-CM

## 2022-01-01 DIAGNOSIS — N18.6 END STAGE RENAL DISEASE (HCC): ICD-10-CM

## 2022-01-01 DIAGNOSIS — Z98.890 S/P AORTA REPAIR: ICD-10-CM

## 2022-01-01 DIAGNOSIS — I48.0 PAROXYSMAL ATRIAL FIBRILLATION (HCC): Primary | ICD-10-CM

## 2022-01-01 DIAGNOSIS — N18.9 CHRONIC KIDNEY DISEASE-MINERAL AND BONE DISORDER: Primary | ICD-10-CM

## 2022-01-01 DIAGNOSIS — E72.20 HYPERAMMONEMIA (HCC): Primary | ICD-10-CM

## 2022-01-01 DIAGNOSIS — R07.9 CHEST PAIN: Primary | ICD-10-CM

## 2022-01-01 DIAGNOSIS — K86.89 PANCREATIC MASS: ICD-10-CM

## 2022-01-01 DIAGNOSIS — K74.60 LIVER CIRRHOSIS SECONDARY TO NASH (HCC): ICD-10-CM

## 2022-01-01 DIAGNOSIS — Z71.89 COMPLEX CARE COORDINATION: Primary | ICD-10-CM

## 2022-01-01 DIAGNOSIS — N18.6 ESRD (END STAGE RENAL DISEASE) (HCC): ICD-10-CM

## 2022-01-01 DIAGNOSIS — N18.6 ESRD (END STAGE RENAL DISEASE) ON DIALYSIS (HCC): Primary | ICD-10-CM

## 2022-01-01 DIAGNOSIS — E87.5 HYPERKALEMIA: Primary | ICD-10-CM

## 2022-01-01 DIAGNOSIS — G47.9 SLEEP DISORDER: ICD-10-CM

## 2022-01-01 DIAGNOSIS — N18.9 CHRONIC KIDNEY DISEASE-MINERAL AND BONE DISORDER: ICD-10-CM

## 2022-01-01 DIAGNOSIS — R19.7 DIARRHEA: ICD-10-CM

## 2022-01-01 DIAGNOSIS — R53.83 LETHARGY: ICD-10-CM

## 2022-01-01 DIAGNOSIS — I95.9 HYPOTENSION, UNSPECIFIED HYPOTENSION TYPE: ICD-10-CM

## 2022-01-01 DIAGNOSIS — Z51.5 PALLIATIVE CARE PATIENT: ICD-10-CM

## 2022-01-01 DIAGNOSIS — R00.2 PALPITATIONS: ICD-10-CM

## 2022-01-01 DIAGNOSIS — K74.60 HEPATIC CIRRHOSIS, UNSPECIFIED HEPATIC CIRRHOSIS TYPE, UNSPECIFIED WHETHER ASCITES PRESENT (HCC): ICD-10-CM

## 2022-01-01 DIAGNOSIS — I48.0 PAROXYSMAL ATRIAL FIBRILLATION (HCC): ICD-10-CM

## 2022-01-01 DIAGNOSIS — D63.1 ANEMIA OF CHRONIC RENAL FAILURE, UNSPECIFIED CKD STAGE: ICD-10-CM

## 2022-01-01 DIAGNOSIS — Z99.2 ESRD (END STAGE RENAL DISEASE) ON DIALYSIS (HCC): ICD-10-CM

## 2022-01-01 DIAGNOSIS — R77.8 ELEVATED TROPONIN: ICD-10-CM

## 2022-01-01 DIAGNOSIS — I35.0 NONRHEUMATIC AORTIC VALVE STENOSIS: ICD-10-CM

## 2022-01-01 DIAGNOSIS — N18.6 ESRD NEEDING DIALYSIS (HCC): ICD-10-CM

## 2022-01-01 DIAGNOSIS — D63.1 ANEMIA DUE TO CHRONIC KIDNEY DISEASE, UNSPECIFIED CKD STAGE: ICD-10-CM

## 2022-01-01 DIAGNOSIS — N18.6 END STAGE RENAL DISEASE (HCC): Primary | ICD-10-CM

## 2022-01-01 DIAGNOSIS — R18.8 ASCITES: ICD-10-CM

## 2022-01-01 DIAGNOSIS — M89.9 CHRONIC KIDNEY DISEASE-MINERAL AND BONE DISORDER: ICD-10-CM

## 2022-01-01 DIAGNOSIS — G89.29 CHRONIC BILATERAL LOW BACK PAIN WITHOUT SCIATICA: ICD-10-CM

## 2022-01-01 DIAGNOSIS — Z09 HOSPITAL DISCHARGE FOLLOW-UP: Primary | ICD-10-CM

## 2022-01-01 DIAGNOSIS — R09.02 HYPOXIA: ICD-10-CM

## 2022-01-01 DIAGNOSIS — Z79.01 ANTICOAGULATION GOAL OF INR 2 TO 3: ICD-10-CM

## 2022-01-01 DIAGNOSIS — I71.01 DISSECTION OF THORACIC AORTA (HCC): ICD-10-CM

## 2022-01-01 DIAGNOSIS — M89.9 CHRONIC KIDNEY DISEASE-MINERAL AND BONE DISORDER: Primary | ICD-10-CM

## 2022-01-01 DIAGNOSIS — Z51.81 ANTICOAGULATION GOAL OF INR 2 TO 3: ICD-10-CM

## 2022-01-01 DIAGNOSIS — I50.32 CHRONIC DIASTOLIC CHF (CONGESTIVE HEART FAILURE) (HCC): ICD-10-CM

## 2022-01-01 DIAGNOSIS — Z11.52 ENCOUNTER FOR SCREENING FOR COVID-19: ICD-10-CM

## 2022-01-01 DIAGNOSIS — I11.0 HYPERTENSIVE HEART DISEASE WITH HEART FAILURE (HCC): ICD-10-CM

## 2022-01-01 DIAGNOSIS — I36.1 NONRHEUMATIC TRICUSPID VALVE REGURGITATION: ICD-10-CM

## 2022-01-01 DIAGNOSIS — K75.81 LIVER CIRRHOSIS SECONDARY TO NASH (HCC): ICD-10-CM

## 2022-01-01 DIAGNOSIS — M12.812 ROTATOR CUFF ARTHROPATHY, LEFT: ICD-10-CM

## 2022-01-01 DIAGNOSIS — I48.91 ATRIAL FIBRILLATION WITH RAPID VENTRICULAR RESPONSE (HCC): ICD-10-CM

## 2022-01-01 DIAGNOSIS — T82.590D DIALYSIS AV FISTULA MALFUNCTION, SUBSEQUENT ENCOUNTER: Primary | ICD-10-CM

## 2022-01-01 DIAGNOSIS — D68.9 COAGULOPATHY (HCC): ICD-10-CM

## 2022-01-01 DIAGNOSIS — N18.9 ANEMIA OF CHRONIC RENAL FAILURE, UNSPECIFIED CKD STAGE: ICD-10-CM

## 2022-01-01 DIAGNOSIS — N18.9 ANEMIA DUE TO CHRONIC KIDNEY DISEASE, UNSPECIFIED CKD STAGE: ICD-10-CM

## 2022-01-01 DIAGNOSIS — L30.4 INTERTRIGO: ICD-10-CM

## 2022-01-01 DIAGNOSIS — Z99.2 ESRD ON HEMODIALYSIS (HCC): ICD-10-CM

## 2022-01-01 DIAGNOSIS — Z66 PHYSICIAN ORDERS FOR LIFE-SUSTAINING TREATMENT (POLST) FORM INDICATES PATIENT WISH FOR DO-NOT-RESUSCITATE STATUS: ICD-10-CM

## 2022-01-01 DIAGNOSIS — K72.00 ACUTE HEPATIC ENCEPHALOPATHY: ICD-10-CM

## 2022-01-01 DIAGNOSIS — E83.9 CHRONIC KIDNEY DISEASE-MINERAL AND BONE DISORDER: ICD-10-CM

## 2022-01-01 DIAGNOSIS — R93.5 ABNORMAL CT OF THE ABDOMEN: ICD-10-CM

## 2022-01-01 DIAGNOSIS — N18.6 ESRD (END STAGE RENAL DISEASE) ON DIALYSIS (HCC): ICD-10-CM

## 2022-01-01 DIAGNOSIS — E83.9 CHRONIC KIDNEY DISEASE-MINERAL AND BONE DISORDER: Primary | ICD-10-CM

## 2022-01-01 DIAGNOSIS — Z22.1 CLOSTRIDIUM DIFFICILE CARRIER: ICD-10-CM

## 2022-01-01 DIAGNOSIS — I50.32 CHRONIC HEART FAILURE WITH PRESERVED EJECTION FRACTION (HCC): ICD-10-CM

## 2022-01-01 DIAGNOSIS — I48.0 PAF (PAROXYSMAL ATRIAL FIBRILLATION) (HCC): ICD-10-CM

## 2022-01-01 DIAGNOSIS — K74.60 LIVER CIRRHOSIS SECONDARY TO NASH (NONALCOHOLIC STEATOHEPATITIS) (HCC): ICD-10-CM

## 2022-01-01 DIAGNOSIS — Z99.2 ESRD NEEDING DIALYSIS (HCC): ICD-10-CM

## 2022-01-01 DIAGNOSIS — M06.9 RHEUMATOID ARTHRITIS, INVOLVING UNSPECIFIED SITE, UNSPECIFIED WHETHER RHEUMATOID FACTOR PRESENT (HCC): Primary | ICD-10-CM

## 2022-01-01 DIAGNOSIS — K52.9 COLITIS: ICD-10-CM

## 2022-01-01 DIAGNOSIS — I50.32 CHRONIC DIASTOLIC HEART FAILURE (HCC): Primary | ICD-10-CM

## 2022-01-01 DIAGNOSIS — I71.01 DISSECTION OF THORACIC AORTA (HCC): Primary | ICD-10-CM

## 2022-01-01 DIAGNOSIS — M25.50 ARTHRALGIA, UNSPECIFIED JOINT: Primary | ICD-10-CM

## 2022-01-01 DIAGNOSIS — R79.1 SUPRATHERAPEUTIC INR: ICD-10-CM

## 2022-01-01 DIAGNOSIS — R79.89 ELEVATED LACTIC ACID LEVEL: ICD-10-CM

## 2022-01-01 DIAGNOSIS — K75.81 LIVER CIRRHOSIS SECONDARY TO NASH (NONALCOHOLIC STEATOHEPATITIS) (HCC): ICD-10-CM

## 2022-01-01 DIAGNOSIS — Z99.2 ESRD (END STAGE RENAL DISEASE) ON DIALYSIS (HCC): Primary | ICD-10-CM

## 2022-01-01 DIAGNOSIS — T82.590D DIALYSIS AV FISTULA MALFUNCTION, SUBSEQUENT ENCOUNTER: ICD-10-CM

## 2022-01-01 DIAGNOSIS — E78.5 DYSLIPIDEMIA: ICD-10-CM

## 2022-01-01 DIAGNOSIS — E78.2 MIXED HYPERLIPIDEMIA: ICD-10-CM

## 2022-01-01 DIAGNOSIS — I50.9 CHF (CONGESTIVE HEART FAILURE) (HCC): ICD-10-CM

## 2022-01-01 DIAGNOSIS — N18.6 ESRD ON HEMODIALYSIS (HCC): ICD-10-CM

## 2022-01-01 DIAGNOSIS — R53.83 LISTLESSNESS: ICD-10-CM

## 2022-01-01 DIAGNOSIS — M54.50 CHRONIC BILATERAL LOW BACK PAIN WITHOUT SCIATICA: ICD-10-CM

## 2022-01-01 DIAGNOSIS — Z00.00 MEDICARE ANNUAL WELLNESS VISIT, SUBSEQUENT: ICD-10-CM

## 2022-01-01 DIAGNOSIS — E72.20 HYPERAMMONEMIA (HCC): ICD-10-CM

## 2022-01-01 LAB
2HR DELTA HS TROPONIN: -1 NG/L
2HR DELTA HS TROPONIN: -2 NG/L
2HR DELTA HS TROPONIN: -6 NG/L
2HR DELTA HS TROPONIN: 10 NG/L
4HR DELTA HS TROPONIN: -3 NG/L
4HR DELTA HS TROPONIN: -7 NG/L
4HR DELTA HS TROPONIN: 14 NG/L
A1AT SERPL-MCNC: 83 MG/DL (ref 101–187)
ACTIN IGG SERPL-ACNC: 15 UNITS (ref 0–19)
AFP-TM SERPL-MCNC: 1.5 NG/ML (ref 0.5–8)
ALBUMIN FLD-MCNC: <0.6 G/DL
ALBUMIN SERPL BCP-MCNC: 2 G/DL (ref 3.5–5)
ALBUMIN SERPL BCP-MCNC: 2.1 G/DL (ref 3.5–5)
ALBUMIN SERPL BCP-MCNC: 2.2 G/DL (ref 3.5–5)
ALBUMIN SERPL BCP-MCNC: 2.3 G/DL (ref 3.5–5)
ALBUMIN SERPL BCP-MCNC: 2.6 G/DL (ref 3.5–5)
ALBUMIN SERPL BCP-MCNC: 2.6 G/DL (ref 3.5–5)
ALBUMIN SERPL BCP-MCNC: 2.8 G/DL (ref 3.5–5)
ALBUMIN SERPL BCP-MCNC: 2.8 G/DL (ref 3.5–5)
ALBUMIN SERPL BCP-MCNC: 3 G/DL (ref 3.5–5)
ALBUMIN SERPL BCP-MCNC: 3.1 G/DL (ref 3.5–5)
ALP SERPL-CCNC: 101 U/L (ref 34–104)
ALP SERPL-CCNC: 107 U/L (ref 34–104)
ALP SERPL-CCNC: 112 U/L (ref 34–104)
ALP SERPL-CCNC: 116 U/L (ref 46–116)
ALP SERPL-CCNC: 120 U/L (ref 34–104)
ALP SERPL-CCNC: 126 U/L (ref 46–116)
ALP SERPL-CCNC: 136 U/L (ref 34–104)
ALP SERPL-CCNC: 138 U/L (ref 34–104)
ALP SERPL-CCNC: 80 U/L (ref 34–104)
ALP SERPL-CCNC: 81 U/L (ref 34–104)
ALP SERPL-CCNC: 84 U/L (ref 34–104)
ALP SERPL-CCNC: 86 U/L (ref 34–104)
ALT SERPL W P-5'-P-CCNC: 19 U/L (ref 7–52)
ALT SERPL W P-5'-P-CCNC: 19 U/L (ref 7–52)
ALT SERPL W P-5'-P-CCNC: 20 U/L (ref 7–52)
ALT SERPL W P-5'-P-CCNC: 20 U/L (ref 7–52)
ALT SERPL W P-5'-P-CCNC: 25 U/L (ref 7–52)
ALT SERPL W P-5'-P-CCNC: 25 U/L (ref 7–52)
ALT SERPL W P-5'-P-CCNC: 34 U/L (ref 12–78)
ALT SERPL W P-5'-P-CCNC: 34 U/L (ref 12–78)
ALT SERPL W P-5'-P-CCNC: 49 U/L (ref 7–52)
ALT SERPL W P-5'-P-CCNC: 57 U/L (ref 7–52)
ALT SERPL W P-5'-P-CCNC: 57 U/L (ref 7–52)
ALT SERPL W P-5'-P-CCNC: 69 U/L (ref 7–52)
AMMONIA PLAS-SCNC: 155 UMOL/L (ref 18–72)
AMMONIA PLAS-SCNC: 167 UMOL/L (ref 18–72)
AMMONIA PLAS-SCNC: 398 UMOL/L (ref 18–72)
AMMONIA PLAS-SCNC: 48 UMOL/L (ref 18–72)
AMMONIA PLAS-SCNC: 66 UMOL/L (ref 18–72)
AMMONIA PLAS-SCNC: 81 UMOL/L (ref 18–72)
ANION GAP SERPL CALCULATED.3IONS-SCNC: 10 MMOL/L (ref 4–13)
ANION GAP SERPL CALCULATED.3IONS-SCNC: 11 MMOL/L (ref 4–13)
ANION GAP SERPL CALCULATED.3IONS-SCNC: 11 MMOL/L (ref 4–13)
ANION GAP SERPL CALCULATED.3IONS-SCNC: 14 MMOL/L (ref 4–13)
ANION GAP SERPL CALCULATED.3IONS-SCNC: 14 MMOL/L (ref 4–13)
ANION GAP SERPL CALCULATED.3IONS-SCNC: 4 MMOL/L (ref 4–13)
ANION GAP SERPL CALCULATED.3IONS-SCNC: 5 MMOL/L (ref 4–13)
ANION GAP SERPL CALCULATED.3IONS-SCNC: 7 MMOL/L (ref 4–13)
ANION GAP SERPL CALCULATED.3IONS-SCNC: 8 MMOL/L (ref 4–13)
ANION GAP SERPL CALCULATED.3IONS-SCNC: 9 MMOL/L (ref 4–13)
AORTIC ROOT: 3.1 CM
AORTIC VALVE MEAN VELOCITY: 19.9 M/S
APICAL FOUR CHAMBER EJECTION FRACTION: 67 %
APPEARANCE FLD: NORMAL
APTT PPP: 45 SECONDS (ref 23–37)
APTT PPP: 45 SECONDS (ref 23–37)
APTT PPP: 53 SECONDS (ref 23–37)
APTT PPP: 60 SECONDS (ref 23–37)
ASCENDING AORTA: 2.7 CM
AST SERPL W P-5'-P-CCNC: 122 U/L (ref 13–39)
AST SERPL W P-5'-P-CCNC: 130 U/L (ref 13–39)
AST SERPL W P-5'-P-CCNC: 145 U/L (ref 13–39)
AST SERPL W P-5'-P-CCNC: 168 U/L (ref 13–39)
AST SERPL W P-5'-P-CCNC: 33 U/L (ref 13–39)
AST SERPL W P-5'-P-CCNC: 33 U/L (ref 13–39)
AST SERPL W P-5'-P-CCNC: 35 U/L (ref 13–39)
AST SERPL W P-5'-P-CCNC: 36 U/L (ref 13–39)
AST SERPL W P-5'-P-CCNC: 37 U/L (ref 13–39)
AST SERPL W P-5'-P-CCNC: 42 U/L (ref 13–39)
AST SERPL W P-5'-P-CCNC: 50 U/L (ref 5–45)
AST SERPL W P-5'-P-CCNC: 68 U/L (ref 5–45)
ATRIAL RATE: 300 BPM
ATRIAL RATE: 54 BPM
ATRIAL RATE: 54 BPM
ATRIAL RATE: 68 BPM
ATRIAL RATE: 77 BPM
ATRIAL RATE: 83 BPM
ATRIAL RATE: 88 BPM
ATRIAL RATE: 98 BPM
AV AREA BY CONTINUOUS VTI: 1.4 CM2
AV AREA PEAK VELOCITY: 1.4 CM2
AV LVOT MEAN GRADIENT: 2 MMHG
AV LVOT PEAK GRADIENT: 4 MMHG
AV MEAN GRADIENT: 18 MMHG
AV PEAK GRADIENT: 30 MMHG
AV VALVE AREA: 1.41 CM2
AV VELOCITY RATIO: 0.37
BACTERIA BLD CULT: NORMAL
BACTERIA BLD CULT: NORMAL
BACTERIA SPEC BFLD CULT: NO GROWTH
BASE EX.OXY STD BLDV CALC-SCNC: 66 % (ref 60–80)
BASE EX.OXY STD BLDV CALC-SCNC: 68.8 % (ref 60–80)
BASE EXCESS BLDV CALC-SCNC: -2.7 MMOL/L
BASE EXCESS BLDV CALC-SCNC: 6.1 MMOL/L
BASOPHILS # BLD AUTO: 0.01 THOUSANDS/ΜL (ref 0–0.1)
BASOPHILS # BLD AUTO: 0.01 THOUSANDS/ΜL (ref 0–0.1)
BASOPHILS # BLD AUTO: 0.02 THOUSANDS/ΜL (ref 0–0.1)
BASOPHILS NFR BLD AUTO: 0 % (ref 0–1)
BILIRUB DIRECT SERPL-MCNC: 0.13 MG/DL (ref 0–0.2)
BILIRUB DIRECT SERPL-MCNC: 0.27 MG/DL (ref 0–0.2)
BILIRUB SERPL-MCNC: 0.76 MG/DL (ref 0.2–1)
BILIRUB SERPL-MCNC: 0.8 MG/DL (ref 0.2–1)
BILIRUB SERPL-MCNC: 0.88 MG/DL (ref 0.2–1)
BILIRUB SERPL-MCNC: 0.94 MG/DL (ref 0.2–1)
BILIRUB SERPL-MCNC: 1.12 MG/DL (ref 0.2–1)
BILIRUB SERPL-MCNC: 1.2 MG/DL (ref 0.2–1)
BILIRUB SERPL-MCNC: 1.37 MG/DL (ref 0.2–1)
BILIRUB SERPL-MCNC: 1.44 MG/DL (ref 0.2–1)
BILIRUB SERPL-MCNC: 1.45 MG/DL (ref 0.2–1)
BILIRUB SERPL-MCNC: 1.72 MG/DL (ref 0.2–1)
BILIRUB SERPL-MCNC: 1.84 MG/DL (ref 0.2–1)
BILIRUB SERPL-MCNC: 2.19 MG/DL (ref 0.2–1)
BNP SERPL-MCNC: 1529 PG/ML (ref 0–100)
BUN SERPL-MCNC: 20 MG/DL (ref 5–25)
BUN SERPL-MCNC: 25 MG/DL (ref 5–25)
BUN SERPL-MCNC: 26 MG/DL (ref 5–25)
BUN SERPL-MCNC: 30 MG/DL (ref 5–25)
BUN SERPL-MCNC: 32 MG/DL (ref 5–25)
BUN SERPL-MCNC: 37 MG/DL (ref 5–25)
BUN SERPL-MCNC: 39 MG/DL (ref 5–25)
BUN SERPL-MCNC: 42 MG/DL (ref 5–25)
BUN SERPL-MCNC: 45 MG/DL (ref 5–25)
BUN SERPL-MCNC: 45 MG/DL (ref 5–25)
BUN SERPL-MCNC: 48 MG/DL (ref 5–25)
BUN SERPL-MCNC: 53 MG/DL (ref 5–25)
BUN SERPL-MCNC: 58 MG/DL (ref 5–25)
BUN SERPL-MCNC: 64 MG/DL (ref 5–25)
BUN SERPL-MCNC: 71 MG/DL (ref 5–25)
BUN SERPL-MCNC: 75 MG/DL (ref 5–25)
C DIFF TOX A+B STL QL IA: NEGATIVE
C DIFF TOX B TCDB STL QL NAA+PROBE: POSITIVE
CALCIUM ALBUM COR SERPL-MCNC: 10 MG/DL (ref 8.3–10.1)
CALCIUM ALBUM COR SERPL-MCNC: 9.2 MG/DL (ref 8.3–10.1)
CALCIUM ALBUM COR SERPL-MCNC: 9.2 MG/DL (ref 8.3–10.1)
CALCIUM ALBUM COR SERPL-MCNC: 9.3 MG/DL (ref 8.3–10.1)
CALCIUM ALBUM COR SERPL-MCNC: 9.3 MG/DL (ref 8.3–10.1)
CALCIUM ALBUM COR SERPL-MCNC: 9.4 MG/DL (ref 8.3–10.1)
CALCIUM ALBUM COR SERPL-MCNC: 9.4 MG/DL (ref 8.3–10.1)
CALCIUM ALBUM COR SERPL-MCNC: 9.6 MG/DL (ref 8.3–10.1)
CALCIUM ALBUM COR SERPL-MCNC: 9.8 MG/DL (ref 8.3–10.1)
CALCIUM ALBUM COR SERPL-MCNC: 9.9 MG/DL (ref 8.3–10.1)
CALCIUM SERPL-MCNC: 7.7 MG/DL (ref 8.4–10.2)
CALCIUM SERPL-MCNC: 7.9 MG/DL (ref 8.3–10.1)
CALCIUM SERPL-MCNC: 8 MG/DL (ref 8.3–10.1)
CALCIUM SERPL-MCNC: 8.1 MG/DL (ref 8.4–10.2)
CALCIUM SERPL-MCNC: 8.4 MG/DL (ref 8.4–10.2)
CALCIUM SERPL-MCNC: 8.5 MG/DL (ref 8.3–10.1)
CALCIUM SERPL-MCNC: 8.5 MG/DL (ref 8.4–10.2)
CALCIUM SERPL-MCNC: 8.5 MG/DL (ref 8.4–10.2)
CALCIUM SERPL-MCNC: 8.6 MG/DL (ref 8.4–10.2)
CALCIUM SERPL-MCNC: 8.7 MG/DL (ref 8.4–10.2)
CALCIUM SERPL-MCNC: 8.9 MG/DL (ref 8.4–10.2)
CAMPYLOBACTER DNA SPEC NAA+PROBE: NORMAL
CARDIAC TROPONIN I PNL SERPL HS: 26 NG/L
CARDIAC TROPONIN I PNL SERPL HS: 28 NG/L
CARDIAC TROPONIN I PNL SERPL HS: 39 NG/L
CARDIAC TROPONIN I PNL SERPL HS: 47 NG/L
CARDIAC TROPONIN I PNL SERPL HS: 48 NG/L
CARDIAC TROPONIN I PNL SERPL HS: 51 NG/L
CARDIAC TROPONIN I PNL SERPL HS: 53 NG/L
CARDIAC TROPONIN I PNL SERPL HS: 54 NG/L
CARDIAC TROPONIN I PNL SERPL HS: 54 NG/L
CARDIAC TROPONIN I PNL SERPL HS: 57 NG/L
CARDIAC TROPONIN I PNL SERPL HS: 67 NG/L
CARDIAC TROPONIN I PNL SERPL HS: 71 NG/L
CERULOPLASMIN SERPL-MCNC: 20.4 MG/DL (ref 16–31)
CHLORIDE SERPL-SCNC: 100 MMOL/L (ref 96–108)
CHLORIDE SERPL-SCNC: 101 MMOL/L (ref 100–108)
CHLORIDE SERPL-SCNC: 101 MMOL/L (ref 100–108)
CHLORIDE SERPL-SCNC: 101 MMOL/L (ref 96–108)
CHLORIDE SERPL-SCNC: 102 MMOL/L (ref 96–108)
CHLORIDE SERPL-SCNC: 103 MMOL/L (ref 96–108)
CHLORIDE SERPL-SCNC: 103 MMOL/L (ref 96–108)
CHLORIDE SERPL-SCNC: 104 MMOL/L (ref 100–108)
CHLORIDE SERPL-SCNC: 104 MMOL/L (ref 96–108)
CHLORIDE SERPL-SCNC: 95 MMOL/L (ref 96–108)
CHLORIDE SERPL-SCNC: 95 MMOL/L (ref 96–108)
CHLORIDE SERPL-SCNC: 96 MMOL/L (ref 96–108)
CHLORIDE SERPL-SCNC: 98 MMOL/L (ref 96–108)
CHLORIDE SERPL-SCNC: 99 MMOL/L (ref 96–108)
CK MB SERPL-MCNC: 1.8 % (ref 0–2.5)
CK MB SERPL-MCNC: 3.6 NG/ML (ref 0.6–6.3)
CK SERPL-CCNC: 205 U/L (ref 39–308)
CO2 SERPL-SCNC: 20 MMOL/L (ref 21–32)
CO2 SERPL-SCNC: 20 MMOL/L (ref 21–32)
CO2 SERPL-SCNC: 24 MMOL/L (ref 21–32)
CO2 SERPL-SCNC: 25 MMOL/L (ref 21–32)
CO2 SERPL-SCNC: 25 MMOL/L (ref 21–32)
CO2 SERPL-SCNC: 27 MMOL/L (ref 21–32)
CO2 SERPL-SCNC: 28 MMOL/L (ref 21–32)
CO2 SERPL-SCNC: 29 MMOL/L (ref 21–32)
CO2 SERPL-SCNC: 30 MMOL/L (ref 21–32)
CO2 SERPL-SCNC: 31 MMOL/L (ref 21–32)
CO2 SERPL-SCNC: 32 MMOL/L (ref 21–32)
COLOR FLD: NORMAL
CREAT SERPL-MCNC: 10.57 MG/DL (ref 0.6–1.3)
CREAT SERPL-MCNC: 10.63 MG/DL (ref 0.6–1.3)
CREAT SERPL-MCNC: 11.19 MG/DL (ref 0.6–1.3)
CREAT SERPL-MCNC: 3.67 MG/DL (ref 0.6–1.3)
CREAT SERPL-MCNC: 4.4 MG/DL (ref 0.6–1.3)
CREAT SERPL-MCNC: 4.81 MG/DL (ref 0.6–1.3)
CREAT SERPL-MCNC: 5.06 MG/DL (ref 0.6–1.3)
CREAT SERPL-MCNC: 5.22 MG/DL (ref 0.6–1.3)
CREAT SERPL-MCNC: 5.66 MG/DL (ref 0.6–1.3)
CREAT SERPL-MCNC: 6.18 MG/DL (ref 0.6–1.3)
CREAT SERPL-MCNC: 7.27 MG/DL (ref 0.6–1.3)
CREAT SERPL-MCNC: 7.42 MG/DL (ref 0.6–1.3)
CREAT SERPL-MCNC: 7.8 MG/DL (ref 0.6–1.3)
CREAT SERPL-MCNC: 7.87 MG/DL (ref 0.6–1.3)
CREAT SERPL-MCNC: 8.94 MG/DL (ref 0.6–1.3)
CREAT SERPL-MCNC: 9.17 MG/DL (ref 0.6–1.3)
DOP CALC AO PEAK VEL: 2.75 M/S
DOP CALC AO VTI: 69.2 CM
DOP CALC LVOT AREA: 3.8 CM2
DOP CALC LVOT DIAMETER: 2.2 CM
DOP CALC LVOT PEAK VEL VTI: 25.63 CM
DOP CALC LVOT PEAK VEL: 1.02 M/S
DOP CALC LVOT STROKE INDEX: 50 ML/M2
DOP CALC LVOT STROKE VOLUME: 97.38 CM3
E WAVE DECELERATION TIME: 279 MS
EOSINOPHIL # BLD AUTO: 0.04 THOUSAND/ΜL (ref 0–0.61)
EOSINOPHIL # BLD AUTO: 0.1 THOUSAND/ΜL (ref 0–0.61)
EOSINOPHIL # BLD AUTO: 0.14 THOUSAND/ΜL (ref 0–0.61)
EOSINOPHIL # BLD AUTO: 0.14 THOUSAND/ΜL (ref 0–0.61)
EOSINOPHIL # BLD AUTO: 0.15 THOUSAND/ΜL (ref 0–0.61)
EOSINOPHIL # BLD AUTO: 0.17 THOUSAND/ΜL (ref 0–0.61)
EOSINOPHIL # BLD AUTO: 0.18 THOUSAND/ΜL (ref 0–0.61)
EOSINOPHIL # BLD AUTO: 0.2 THOUSAND/ΜL (ref 0–0.61)
EOSINOPHIL NFR BLD AUTO: 1 % (ref 0–6)
EOSINOPHIL NFR BLD AUTO: 2 % (ref 0–6)
EOSINOPHIL NFR BLD AUTO: 3 % (ref 0–6)
EOSINOPHIL NFR BLD AUTO: 4 % (ref 0–6)
EOSINOPHIL NFR BLD AUTO: 4 % (ref 0–6)
EOSINOPHIL NFR BLD AUTO: 5 % (ref 0–6)
ERYTHROCYTE [DISTWIDTH] IN BLOOD BY AUTOMATED COUNT: 15.7 % (ref 11.6–15.1)
ERYTHROCYTE [DISTWIDTH] IN BLOOD BY AUTOMATED COUNT: 15.9 % (ref 11.6–15.1)
ERYTHROCYTE [DISTWIDTH] IN BLOOD BY AUTOMATED COUNT: 16.1 % (ref 11.6–15.1)
ERYTHROCYTE [DISTWIDTH] IN BLOOD BY AUTOMATED COUNT: 16.1 % (ref 11.6–15.1)
ERYTHROCYTE [DISTWIDTH] IN BLOOD BY AUTOMATED COUNT: 16.2 % (ref 11.6–15.1)
ERYTHROCYTE [DISTWIDTH] IN BLOOD BY AUTOMATED COUNT: 16.2 % (ref 11.6–15.1)
ERYTHROCYTE [DISTWIDTH] IN BLOOD BY AUTOMATED COUNT: 16.4 % (ref 11.6–15.1)
ERYTHROCYTE [DISTWIDTH] IN BLOOD BY AUTOMATED COUNT: 16.7 % (ref 11.6–15.1)
ERYTHROCYTE [DISTWIDTH] IN BLOOD BY AUTOMATED COUNT: 16.8 % (ref 11.6–15.1)
ERYTHROCYTE [DISTWIDTH] IN BLOOD BY AUTOMATED COUNT: 17.1 % (ref 11.6–15.1)
ERYTHROCYTE [DISTWIDTH] IN BLOOD BY AUTOMATED COUNT: 17.1 % (ref 11.6–15.1)
ERYTHROCYTE [DISTWIDTH] IN BLOOD BY AUTOMATED COUNT: 17.2 % (ref 11.6–15.1)
ERYTHROCYTE [DISTWIDTH] IN BLOOD BY AUTOMATED COUNT: 17.4 % (ref 11.6–15.1)
FERRITIN SERPL-MCNC: 280 NG/ML (ref 8–388)
FLUAV RNA RESP QL NAA+PROBE: NEGATIVE
FLUAV RNA RESP QL NAA+PROBE: NEGATIVE
FLUBV RNA RESP QL NAA+PROBE: NEGATIVE
FLUBV RNA RESP QL NAA+PROBE: NEGATIVE
FOLATE SERPL-MCNC: 11.3 NG/ML (ref 3.1–17.5)
FRACTIONAL SHORTENING: 37 % (ref 28–44)
GFR SERPL CREATININE-BSD FRML MDRD: 10 ML/MIN/1.73SQ M
GFR SERPL CREATININE-BSD FRML MDRD: 11 ML/MIN/1.73SQ M
GFR SERPL CREATININE-BSD FRML MDRD: 14 ML/MIN/1.73SQ M
GFR SERPL CREATININE-BSD FRML MDRD: 3 ML/MIN/1.73SQ M
GFR SERPL CREATININE-BSD FRML MDRD: 4 ML/MIN/1.73SQ M
GFR SERPL CREATININE-BSD FRML MDRD: 5 ML/MIN/1.73SQ M
GFR SERPL CREATININE-BSD FRML MDRD: 6 ML/MIN/1.73SQ M
GFR SERPL CREATININE-BSD FRML MDRD: 6 ML/MIN/1.73SQ M
GFR SERPL CREATININE-BSD FRML MDRD: 7 ML/MIN/1.73SQ M
GFR SERPL CREATININE-BSD FRML MDRD: 8 ML/MIN/1.73SQ M
GFR SERPL CREATININE-BSD FRML MDRD: 9 ML/MIN/1.73SQ M
GFR SERPL CREATININE-BSD FRML MDRD: 9 ML/MIN/1.73SQ M
GLUCOSE FLD-MCNC: 117 MG/DL
GLUCOSE SERPL-MCNC: 100 MG/DL (ref 65–140)
GLUCOSE SERPL-MCNC: 100 MG/DL (ref 65–140)
GLUCOSE SERPL-MCNC: 102 MG/DL (ref 65–140)
GLUCOSE SERPL-MCNC: 106 MG/DL (ref 65–140)
GLUCOSE SERPL-MCNC: 170 MG/DL (ref 65–140)
GLUCOSE SERPL-MCNC: 72 MG/DL (ref 65–140)
GLUCOSE SERPL-MCNC: 74 MG/DL (ref 65–140)
GLUCOSE SERPL-MCNC: 74 MG/DL (ref 65–140)
GLUCOSE SERPL-MCNC: 75 MG/DL (ref 65–140)
GLUCOSE SERPL-MCNC: 78 MG/DL (ref 65–140)
GLUCOSE SERPL-MCNC: 78 MG/DL (ref 65–140)
GLUCOSE SERPL-MCNC: 83 MG/DL (ref 65–140)
GLUCOSE SERPL-MCNC: 84 MG/DL (ref 65–140)
GLUCOSE SERPL-MCNC: 84 MG/DL (ref 65–140)
GLUCOSE SERPL-MCNC: 88 MG/DL (ref 65–140)
GLUCOSE SERPL-MCNC: 90 MG/DL (ref 65–140)
GLUCOSE SERPL-MCNC: 97 MG/DL (ref 65–140)
GRAM STN SPEC: NORMAL
GRAM STN SPEC: NORMAL
HBV CORE AB SER QL: NORMAL
HBV CORE IGM SER QL: NORMAL
HBV SURFACE AG SER QL: NORMAL
HCO3 BLDV-SCNC: 22.5 MMOL/L (ref 24–30)
HCO3 BLDV-SCNC: 30.1 MMOL/L (ref 24–30)
HCT VFR BLD AUTO: 21.3 % (ref 36.5–49.3)
HCT VFR BLD AUTO: 22.1 % (ref 36.5–49.3)
HCT VFR BLD AUTO: 22.5 % (ref 36.5–49.3)
HCT VFR BLD AUTO: 23.8 % (ref 36.5–49.3)
HCT VFR BLD AUTO: 24.8 % (ref 36.5–49.3)
HCT VFR BLD AUTO: 26 % (ref 36.5–49.3)
HCT VFR BLD AUTO: 26.3 % (ref 36.5–49.3)
HCT VFR BLD AUTO: 27 % (ref 36.5–49.3)
HCT VFR BLD AUTO: 27.4 % (ref 36.5–49.3)
HCT VFR BLD AUTO: 27.8 % (ref 36.5–49.3)
HCT VFR BLD AUTO: 28 % (ref 36.5–49.3)
HCT VFR BLD AUTO: 29.3 % (ref 36.5–49.3)
HCT VFR BLD AUTO: 29.8 % (ref 36.5–49.3)
HCT VFR BLD AUTO: 29.8 % (ref 36.5–49.3)
HCT VFR BLD AUTO: 30.2 % (ref 36.5–49.3)
HCT VFR BLD AUTO: 30.5 % (ref 36.5–49.3)
HCV AB SER QL: NORMAL
HGB BLD-MCNC: 10.2 G/DL (ref 12–17)
HGB BLD-MCNC: 7.2 G/DL (ref 12–17)
HGB BLD-MCNC: 7.2 G/DL (ref 12–17)
HGB BLD-MCNC: 7.4 G/DL (ref 12–17)
HGB BLD-MCNC: 7.7 G/DL (ref 12–17)
HGB BLD-MCNC: 7.9 G/DL (ref 12–17)
HGB BLD-MCNC: 8.1 G/DL (ref 12–17)
HGB BLD-MCNC: 8.5 G/DL (ref 12–17)
HGB BLD-MCNC: 8.6 G/DL (ref 12–17)
HGB BLD-MCNC: 9 G/DL (ref 12–17)
HGB BLD-MCNC: 9.1 G/DL (ref 12–17)
HGB BLD-MCNC: 9.2 G/DL (ref 12–17)
HGB BLD-MCNC: 9.2 G/DL (ref 12–17)
HGB BLD-MCNC: 9.5 G/DL (ref 12–17)
HGB BLD-MCNC: 9.6 G/DL (ref 12–17)
HGB BLD-MCNC: 9.7 G/DL (ref 12–17)
HGB BLD-MCNC: 9.8 G/DL (ref 12–17)
HISTIOCYTES NFR FLD: 74 %
IMM GRANULOCYTES # BLD AUTO: 0.02 THOUSAND/UL (ref 0–0.2)
IMM GRANULOCYTES # BLD AUTO: 0.03 THOUSAND/UL (ref 0–0.2)
IMM GRANULOCYTES # BLD AUTO: 0.03 THOUSAND/UL (ref 0–0.2)
IMM GRANULOCYTES # BLD AUTO: 0.04 THOUSAND/UL (ref 0–0.2)
IMM GRANULOCYTES # BLD AUTO: 0.05 THOUSAND/UL (ref 0–0.2)
IMM GRANULOCYTES NFR BLD AUTO: 0 % (ref 0–2)
IMM GRANULOCYTES NFR BLD AUTO: 1 % (ref 0–2)
INR PPP: 1.5 (ref 0.84–1.19)
INR PPP: 1.54 (ref 0.84–1.19)
INR PPP: 1.62 (ref 0.84–1.19)
INR PPP: 1.84 (ref 0.84–1.19)
INR PPP: 1.85 (ref 0.84–1.19)
INR PPP: 2.23 (ref 0.84–1.19)
INR PPP: 2.29 (ref 0.84–1.19)
INR PPP: 2.39 (ref 0.84–1.19)
INR PPP: 3.27 (ref 0.84–1.19)
INTERVENTRICULAR SEPTUM IN DIASTOLE (PARASTERNAL SHORT AXIS VIEW): 1.1 CM
INTERVENTRICULAR SEPTUM: 1.1 CM (ref 0.6–1.1)
IRON SATN MFR SERPL: 74 % (ref 20–50)
IRON SERPL-MCNC: 139 UG/DL (ref 65–175)
LACTATE SERPL-SCNC: 2.5 MMOL/L (ref 0.5–2)
LACTATE SERPL-SCNC: 2.8 MMOL/L (ref 0.5–2)
LDH FLD L TO P-CCNC: 88 U/L
LEFT ATRIUM AREA SYSTOLE SINGLE PLANE A4C: 24.6 CM2
LEFT ATRIUM SIZE: 4.5 CM
LEFT INTERNAL DIMENSION IN SYSTOLE: 3.1 CM (ref 2.1–4)
LEFT VENTRICULAR INTERNAL DIMENSION IN DIASTOLE: 4.9 CM (ref 3.5–6)
LEFT VENTRICULAR POSTERIOR WALL IN END DIASTOLE: 1.1 CM
LEFT VENTRICULAR STROKE VOLUME: 78 ML
LIPASE SERPL-CCNC: 35 U/L (ref 11–82)
LIPASE SERPL-CCNC: 50 U/L (ref 11–82)
LIPASE SERPL-CCNC: 51 U/L (ref 11–82)
LVSV (TEICH): 78 ML
LYMPHOCYTES # BLD AUTO: 0.56 THOUSANDS/ΜL (ref 0.6–4.47)
LYMPHOCYTES # BLD AUTO: 0.6 THOUSANDS/ΜL (ref 0.6–4.47)
LYMPHOCYTES # BLD AUTO: 0.8 THOUSANDS/ΜL (ref 0.6–4.47)
LYMPHOCYTES # BLD AUTO: 0.87 THOUSANDS/ΜL (ref 0.6–4.47)
LYMPHOCYTES # BLD AUTO: 0.89 THOUSANDS/ΜL (ref 0.6–4.47)
LYMPHOCYTES # BLD AUTO: 0.89 THOUSANDS/ΜL (ref 0.6–4.47)
LYMPHOCYTES # BLD AUTO: 0.9 THOUSANDS/ΜL (ref 0.6–4.47)
LYMPHOCYTES # BLD AUTO: 1.17 THOUSANDS/ΜL (ref 0.6–4.47)
LYMPHOCYTES NFR BLD AUTO: 12 % (ref 14–44)
LYMPHOCYTES NFR BLD AUTO: 13 %
LYMPHOCYTES NFR BLD AUTO: 15 % (ref 14–44)
LYMPHOCYTES NFR BLD AUTO: 15 % (ref 14–44)
LYMPHOCYTES NFR BLD AUTO: 16 % (ref 14–44)
LYMPHOCYTES NFR BLD AUTO: 16 % (ref 14–44)
LYMPHOCYTES NFR BLD AUTO: 18 % (ref 14–44)
LYMPHOCYTES NFR BLD AUTO: 19 % (ref 14–44)
LYMPHOCYTES NFR BLD AUTO: 23 % (ref 14–44)
MAGNESIUM SERPL-MCNC: 2 MG/DL (ref 1.6–2.6)
MAGNESIUM SERPL-MCNC: 2 MG/DL (ref 1.9–2.7)
MAGNESIUM SERPL-MCNC: 2.2 MG/DL (ref 1.9–2.7)
MAGNESIUM SERPL-MCNC: 2.3 MG/DL (ref 1.9–2.7)
MAGNESIUM SERPL-MCNC: 2.5 MG/DL (ref 1.9–2.7)
MCH RBC QN AUTO: 32.6 PG (ref 26.8–34.3)
MCH RBC QN AUTO: 32.7 PG (ref 26.8–34.3)
MCH RBC QN AUTO: 32.8 PG (ref 26.8–34.3)
MCH RBC QN AUTO: 32.9 PG (ref 26.8–34.3)
MCH RBC QN AUTO: 33 PG (ref 26.8–34.3)
MCH RBC QN AUTO: 33.2 PG (ref 26.8–34.3)
MCH RBC QN AUTO: 33.3 PG (ref 26.8–34.3)
MCH RBC QN AUTO: 33.5 PG (ref 26.8–34.3)
MCH RBC QN AUTO: 33.5 PG (ref 26.8–34.3)
MCH RBC QN AUTO: 33.6 PG (ref 26.8–34.3)
MCH RBC QN AUTO: 33.7 PG (ref 26.8–34.3)
MCH RBC QN AUTO: 34.1 PG (ref 26.8–34.3)
MCH RBC QN AUTO: 34.3 PG (ref 26.8–34.3)
MCHC RBC AUTO-ENTMCNC: 30.8 G/DL (ref 31.4–37.4)
MCHC RBC AUTO-ENTMCNC: 31.9 G/DL (ref 31.4–37.4)
MCHC RBC AUTO-ENTMCNC: 31.9 G/DL (ref 31.4–37.4)
MCHC RBC AUTO-ENTMCNC: 32.1 G/DL (ref 31.4–37.4)
MCHC RBC AUTO-ENTMCNC: 32.2 G/DL (ref 31.4–37.4)
MCHC RBC AUTO-ENTMCNC: 32.4 G/DL (ref 31.4–37.4)
MCHC RBC AUTO-ENTMCNC: 32.4 G/DL (ref 31.4–37.4)
MCHC RBC AUTO-ENTMCNC: 32.5 G/DL (ref 31.4–37.4)
MCHC RBC AUTO-ENTMCNC: 32.6 G/DL (ref 31.4–37.4)
MCHC RBC AUTO-ENTMCNC: 32.6 G/DL (ref 31.4–37.4)
MCHC RBC AUTO-ENTMCNC: 32.7 G/DL (ref 31.4–37.4)
MCHC RBC AUTO-ENTMCNC: 32.9 G/DL (ref 31.4–37.4)
MCHC RBC AUTO-ENTMCNC: 33.1 G/DL (ref 31.4–37.4)
MCHC RBC AUTO-ENTMCNC: 33.2 G/DL (ref 31.4–37.4)
MCHC RBC AUTO-ENTMCNC: 33.4 G/DL (ref 31.4–37.4)
MCHC RBC AUTO-ENTMCNC: 33.8 G/DL (ref 31.4–37.4)
MCV RBC AUTO: 101 FL (ref 82–98)
MCV RBC AUTO: 102 FL (ref 82–98)
MCV RBC AUTO: 103 FL (ref 82–98)
MCV RBC AUTO: 104 FL (ref 82–98)
MCV RBC AUTO: 105 FL (ref 82–98)
MCV RBC AUTO: 108 FL (ref 82–98)
MITOCHONDRIA M2 IGG SER-ACNC: <20 UNITS (ref 0–20)
MONO+MESO NFR FLD MANUAL: 7 %
MONOCYTES # BLD AUTO: 0.5 THOUSAND/ΜL (ref 0.17–1.22)
MONOCYTES # BLD AUTO: 0.68 THOUSAND/ΜL (ref 0.17–1.22)
MONOCYTES # BLD AUTO: 0.77 THOUSAND/ΜL (ref 0.17–1.22)
MONOCYTES # BLD AUTO: 0.93 THOUSAND/ΜL (ref 0.17–1.22)
MONOCYTES # BLD AUTO: 0.96 THOUSAND/ΜL (ref 0.17–1.22)
MONOCYTES # BLD AUTO: 0.97 THOUSAND/ΜL (ref 0.17–1.22)
MONOCYTES # BLD AUTO: 0.99 THOUSAND/ΜL (ref 0.17–1.22)
MONOCYTES # BLD AUTO: 1.54 THOUSAND/ΜL (ref 0.17–1.22)
MONOCYTES NFR BLD AUTO: 11 % (ref 4–12)
MONOCYTES NFR BLD AUTO: 14 % (ref 4–12)
MONOCYTES NFR BLD AUTO: 18 % (ref 4–12)
MONOCYTES NFR BLD AUTO: 18 % (ref 4–12)
MONOCYTES NFR BLD AUTO: 19 % (ref 4–12)
MONOCYTES NFR BLD AUTO: 19 % (ref 4–12)
MONOCYTES NFR BLD AUTO: 21 % (ref 4–12)
MONOCYTES NFR BLD AUTO: 26 % (ref 4–12)
MRSA NOSE QL CULT: NORMAL
MRSA NOSE QL CULT: NORMAL
MV E'TISSUE VEL-SEP: 7 CM/S
MV PEAK A VEL: 0.53 M/S
MV PEAK E VEL: 111 CM/S
MV STENOSIS PRESSURE HALF TIME: 81 MS
MV VALVE AREA P 1/2 METHOD: 2.72 CM2
NEUTROPHILS # BLD AUTO: 2.31 THOUSANDS/ΜL (ref 1.85–7.62)
NEUTROPHILS # BLD AUTO: 2.51 THOUSANDS/ΜL (ref 1.85–7.62)
NEUTROPHILS # BLD AUTO: 2.8 THOUSANDS/ΜL (ref 1.85–7.62)
NEUTROPHILS # BLD AUTO: 3.02 THOUSANDS/ΜL (ref 1.85–7.62)
NEUTROPHILS # BLD AUTO: 3.26 THOUSANDS/ΜL (ref 1.85–7.62)
NEUTROPHILS # BLD AUTO: 3.42 THOUSANDS/ΜL (ref 1.85–7.62)
NEUTROPHILS # BLD AUTO: 3.46 THOUSANDS/ΜL (ref 1.85–7.62)
NEUTROPHILS # BLD AUTO: 3.69 THOUSANDS/ΜL (ref 1.85–7.62)
NEUTS SEG NFR BLD AUTO: 53 % (ref 43–75)
NEUTS SEG NFR BLD AUTO: 55 % (ref 43–75)
NEUTS SEG NFR BLD AUTO: 56 % (ref 43–75)
NEUTS SEG NFR BLD AUTO: 6 %
NEUTS SEG NFR BLD AUTO: 60 % (ref 43–75)
NEUTS SEG NFR BLD AUTO: 60 % (ref 43–75)
NEUTS SEG NFR BLD AUTO: 63 % (ref 43–75)
NEUTS SEG NFR BLD AUTO: 68 % (ref 43–75)
NEUTS SEG NFR BLD AUTO: 75 % (ref 43–75)
NRBC BLD AUTO-RTO: 0 /100 WBCS
NT-PROBNP SERPL-MCNC: ABNORMAL PG/ML
O2 CT BLDV-SCNC: 8.4 ML/DL
O2 CT BLDV-SCNC: 9.2 ML/DL
P AXIS: 232 DEGREES
P AXIS: 81 DEGREES
P AXIS: 91 DEGREES
P AXIS: 93 DEGREES
PCO2 BLDV: 40.8 MM HG (ref 42–50)
PCO2 BLDV: 41.1 MM HG (ref 42–50)
PH BLDV: 7.36 [PH] (ref 7.3–7.4)
PH BLDV: 7.48 [PH] (ref 7.3–7.4)
PHOSPHATE SERPL-MCNC: 4.3 MG/DL (ref 2.3–4.1)
PHOSPHATE SERPL-MCNC: 4.6 MG/DL (ref 2.3–4.1)
PHOSPHATE SERPL-MCNC: 5 MG/DL (ref 2.3–4.1)
PHOSPHATE SERPL-MCNC: 5.8 MG/DL (ref 2.3–4.1)
PLATELET # BLD AUTO: 70 THOUSANDS/UL (ref 149–390)
PLATELET # BLD AUTO: 70 THOUSANDS/UL (ref 149–390)
PLATELET # BLD AUTO: 73 THOUSANDS/UL (ref 149–390)
PLATELET # BLD AUTO: 74 THOUSANDS/UL (ref 149–390)
PLATELET # BLD AUTO: 78 THOUSANDS/UL (ref 149–390)
PLATELET # BLD AUTO: 80 THOUSANDS/UL (ref 149–390)
PLATELET # BLD AUTO: 83 THOUSANDS/UL (ref 149–390)
PLATELET # BLD AUTO: 85 THOUSANDS/UL (ref 149–390)
PLATELET # BLD AUTO: 85 THOUSANDS/UL (ref 149–390)
PLATELET # BLD AUTO: 91 THOUSANDS/UL (ref 149–390)
PLATELET # BLD AUTO: 92 THOUSANDS/UL (ref 149–390)
PLATELET # BLD AUTO: 92 THOUSANDS/UL (ref 149–390)
PLATELET # BLD AUTO: 97 THOUSANDS/UL (ref 149–390)
PLATELET # BLD AUTO: 98 THOUSANDS/UL (ref 149–390)
PMV BLD AUTO: 10.7 FL (ref 8.9–12.7)
PMV BLD AUTO: 10.9 FL (ref 8.9–12.7)
PMV BLD AUTO: 11 FL (ref 8.9–12.7)
PMV BLD AUTO: 11.1 FL (ref 8.9–12.7)
PMV BLD AUTO: 11.1 FL (ref 8.9–12.7)
PMV BLD AUTO: 11.2 FL (ref 8.9–12.7)
PMV BLD AUTO: 11.3 FL (ref 8.9–12.7)
PMV BLD AUTO: 11.3 FL (ref 8.9–12.7)
PMV BLD AUTO: 11.4 FL (ref 8.9–12.7)
PMV BLD AUTO: 11.5 FL (ref 8.9–12.7)
PMV BLD AUTO: 11.5 FL (ref 8.9–12.7)
PMV BLD AUTO: 11.6 FL (ref 8.9–12.7)
PMV BLD AUTO: 11.6 FL (ref 8.9–12.7)
PMV BLD AUTO: 11.8 FL (ref 8.9–12.7)
PMV BLD AUTO: 11.9 FL (ref 8.9–12.7)
PMV BLD AUTO: 11.9 FL (ref 8.9–12.7)
PO2 BLDV: 37 MM HG (ref 35–45)
PO2 BLDV: 37.5 MM HG (ref 35–45)
POTASSIUM SERPL-SCNC: 3.2 MMOL/L (ref 3.5–5.3)
POTASSIUM SERPL-SCNC: 3.5 MMOL/L (ref 3.5–5.3)
POTASSIUM SERPL-SCNC: 3.7 MMOL/L (ref 3.5–5.3)
POTASSIUM SERPL-SCNC: 3.9 MMOL/L (ref 3.5–5.3)
POTASSIUM SERPL-SCNC: 3.9 MMOL/L (ref 3.5–5.3)
POTASSIUM SERPL-SCNC: 4 MMOL/L (ref 3.5–5.3)
POTASSIUM SERPL-SCNC: 4.1 MMOL/L (ref 3.5–5.3)
POTASSIUM SERPL-SCNC: 4.2 MMOL/L (ref 3.5–5.3)
POTASSIUM SERPL-SCNC: 4.4 MMOL/L (ref 3.5–5.3)
POTASSIUM SERPL-SCNC: 4.5 MMOL/L (ref 3.5–5.3)
POTASSIUM SERPL-SCNC: 4.5 MMOL/L (ref 3.5–5.3)
POTASSIUM SERPL-SCNC: 4.6 MMOL/L (ref 3.5–5.3)
POTASSIUM SERPL-SCNC: 4.6 MMOL/L (ref 3.5–5.3)
POTASSIUM SERPL-SCNC: 4.7 MMOL/L (ref 3.5–5.3)
POTASSIUM SERPL-SCNC: 5.3 MMOL/L (ref 3.5–5.3)
POTASSIUM SERPL-SCNC: 6.7 MMOL/L (ref 3.5–5.3)
PR INTERVAL: 0 MS
PR INTERVAL: 208 MS
PR INTERVAL: 210 MS
PR INTERVAL: 216 MS
PR INTERVAL: 248 MS
PROCALCITONIN SERPL-MCNC: 1.36 NG/ML
PROCALCITONIN SERPL-MCNC: 1.64 NG/ML
PROT FLD-MCNC: <2 G/DL
PROT SERPL-MCNC: 6.1 G/DL (ref 6.4–8.4)
PROT SERPL-MCNC: 6.2 G/DL (ref 6.4–8.4)
PROT SERPL-MCNC: 6.3 G/DL (ref 6.4–8.4)
PROT SERPL-MCNC: 6.3 G/DL (ref 6.4–8.4)
PROT SERPL-MCNC: 6.5 G/DL (ref 6.4–8.4)
PROT SERPL-MCNC: 6.6 G/DL (ref 6.4–8.4)
PROT SERPL-MCNC: 6.6 G/DL (ref 6.4–8.4)
PROT SERPL-MCNC: 6.7 G/DL (ref 6.4–8.4)
PROT SERPL-MCNC: 7.1 G/DL (ref 6.4–8.2)
PROT SERPL-MCNC: 7.4 G/DL (ref 6.4–8.2)
PROT SERPL-MCNC: 7.5 G/DL (ref 6.4–8.4)
PROT SERPL-MCNC: 7.5 G/DL (ref 6.4–8.4)
PROTHROMBIN TIME: 18 SECONDS (ref 11.6–14.5)
PROTHROMBIN TIME: 18.4 SECONDS (ref 11.6–14.5)
PROTHROMBIN TIME: 19.1 SECONDS (ref 11.6–14.5)
PROTHROMBIN TIME: 21.1 SECONDS (ref 11.6–14.5)
PROTHROMBIN TIME: 21.1 SECONDS (ref 11.6–14.5)
PROTHROMBIN TIME: 24.3 SECONDS (ref 11.6–14.5)
PROTHROMBIN TIME: 24.8 SECONDS (ref 11.6–14.5)
PROTHROMBIN TIME: 24.9 SECONDS (ref 11.6–14.5)
PROTHROMBIN TIME: 32.6 SECONDS (ref 11.6–14.5)
PV PEAK GRADIENT: 3 MMHG
QRS AXIS: 70 DEGREES
QRS AXIS: 76 DEGREES
QRS AXIS: 79 DEGREES
QRS AXIS: 80 DEGREES
QRS AXIS: 82 DEGREES
QRS AXIS: 83 DEGREES
QRS AXIS: 85 DEGREES
QRS AXIS: 87 DEGREES
QRSD INTERVAL: 72 MS
QRSD INTERVAL: 74 MS
QRSD INTERVAL: 74 MS
QRSD INTERVAL: 78 MS
QRSD INTERVAL: 78 MS
QRSD INTERVAL: 80 MS
QRSD INTERVAL: 82 MS
QRSD INTERVAL: 84 MS
QT INTERVAL: 284 MS
QT INTERVAL: 372 MS
QT INTERVAL: 422 MS
QT INTERVAL: 430 MS
QT INTERVAL: 466 MS
QT INTERVAL: 520 MS
QT INTERVAL: 552 MS
QT INTERVAL: 662 MS
QTC INTERVAL: 408 MS
QTC INTERVAL: 418 MS
QTC INTERVAL: 448 MS
QTC INTERVAL: 514 MS
QTC INTERVAL: 523 MS
QTC INTERVAL: 527 MS
QTC INTERVAL: 562 MS
QTC INTERVAL: 627 MS
RA PRESSURE ESTIMATED: 15 MMHG
RBC # BLD AUTO: 2.1 MILLION/UL (ref 3.88–5.62)
RBC # BLD AUTO: 2.2 MILLION/UL (ref 3.88–5.62)
RBC # BLD AUTO: 2.2 MILLION/UL (ref 3.88–5.62)
RBC # BLD AUTO: 2.33 MILLION/UL (ref 3.88–5.62)
RBC # BLD AUTO: 2.41 MILLION/UL (ref 3.88–5.62)
RBC # BLD AUTO: 2.43 MILLION/UL (ref 3.88–5.62)
RBC # BLD AUTO: 2.55 MILLION/UL (ref 3.88–5.62)
RBC # BLD AUTO: 2.57 MILLION/UL (ref 3.88–5.62)
RBC # BLD AUTO: 2.72 MILLION/UL (ref 3.88–5.62)
RBC # BLD AUTO: 2.73 MILLION/UL (ref 3.88–5.62)
RBC # BLD AUTO: 2.76 MILLION/UL (ref 3.88–5.62)
RBC # BLD AUTO: 2.88 MILLION/UL (ref 3.88–5.62)
RBC # BLD AUTO: 2.89 MILLION/UL (ref 3.88–5.62)
RBC # BLD AUTO: 2.91 MILLION/UL (ref 3.88–5.62)
RBC # BLD AUTO: 2.95 MILLION/UL (ref 3.88–5.62)
RBC # BLD AUTO: 2.99 MILLION/UL (ref 3.88–5.62)
RIGHT ATRIUM AREA SYSTOLE A4C: 32.5 CM2
RIGHT VENTRICLE ID DIMENSION: 4.2 CM
RSV RNA RESP QL NAA+PROBE: NEGATIVE
RSV RNA RESP QL NAA+PROBE: NEGATIVE
RV PSP: 32 MMHG
RYE IGE QN: NEGATIVE
SALMONELLA DNA SPEC QL NAA+PROBE: NORMAL
SARS-COV-2 RNA RESP QL NAA+PROBE: NEGATIVE
SHIGA TOXIN STX GENE SPEC NAA+PROBE: NORMAL
SHIGELLA DNA SPEC QL NAA+PROBE: NORMAL
SITE: NORMAL
SL CV LV EF: 55
SL CV PED ECHO LEFT VENTRICLE DIASTOLIC VOLUME (MOD BIPLANE) 2D: 115 ML
SL CV PED ECHO LEFT VENTRICLE SYSTOLIC VOLUME (MOD BIPLANE) 2D: 37 ML
SODIUM SERPL-SCNC: 130 MMOL/L (ref 135–147)
SODIUM SERPL-SCNC: 131 MMOL/L (ref 135–147)
SODIUM SERPL-SCNC: 131 MMOL/L (ref 135–147)
SODIUM SERPL-SCNC: 133 MMOL/L (ref 135–147)
SODIUM SERPL-SCNC: 133 MMOL/L (ref 135–147)
SODIUM SERPL-SCNC: 135 MMOL/L (ref 135–147)
SODIUM SERPL-SCNC: 136 MMOL/L (ref 135–147)
SODIUM SERPL-SCNC: 137 MMOL/L (ref 135–147)
SODIUM SERPL-SCNC: 137 MMOL/L (ref 135–147)
SODIUM SERPL-SCNC: 137 MMOL/L (ref 136–145)
SODIUM SERPL-SCNC: 138 MMOL/L (ref 135–147)
SODIUM SERPL-SCNC: 138 MMOL/L (ref 136–145)
SODIUM SERPL-SCNC: 140 MMOL/L (ref 136–145)
T WAVE AXIS: -50 DEGREES
T WAVE AXIS: -7 DEGREES
T WAVE AXIS: 113 DEGREES
T WAVE AXIS: 121 DEGREES
T WAVE AXIS: 138 DEGREES
T WAVE AXIS: 190 DEGREES
T WAVE AXIS: 216 DEGREES
T WAVE AXIS: 45 DEGREES
TIBC SERPL-MCNC: 188 UG/DL (ref 250–450)
TOTAL CELLS COUNTED SPEC: 100
TR MAX PG: 17 MMHG
TR PEAK VELOCITY: 2 M/S
TRICUSPID VALVE PEAK REGURGITATION VELOCITY: 2.03 M/S
TSH SERPL DL<=0.05 MIU/L-ACNC: 2.17 UIU/ML (ref 0.45–4.5)
TSH SERPL DL<=0.05 MIU/L-ACNC: 2.45 UIU/ML (ref 0.45–4.5)
VENTRICULAR RATE: 124 BPM
VENTRICULAR RATE: 54 BPM
VENTRICULAR RATE: 54 BPM
VENTRICULAR RATE: 68 BPM
VENTRICULAR RATE: 70 BPM
VENTRICULAR RATE: 76 BPM
VENTRICULAR RATE: 77 BPM
VENTRICULAR RATE: 86 BPM
VIT B12 SERPL-MCNC: 1719 PG/ML (ref 100–900)
WBC # BLD AUTO: 3.79 THOUSAND/UL (ref 4.31–10.16)
WBC # BLD AUTO: 4.18 THOUSAND/UL (ref 4.31–10.16)
WBC # BLD AUTO: 4.45 THOUSAND/UL (ref 4.31–10.16)
WBC # BLD AUTO: 4.57 THOUSAND/UL (ref 4.31–10.16)
WBC # BLD AUTO: 4.6 THOUSAND/UL (ref 4.31–10.16)
WBC # BLD AUTO: 4.78 THOUSAND/UL (ref 4.31–10.16)
WBC # BLD AUTO: 5.03 THOUSAND/UL (ref 4.31–10.16)
WBC # BLD AUTO: 5.17 THOUSAND/UL (ref 4.31–10.16)
WBC # BLD AUTO: 5.18 THOUSAND/UL (ref 4.31–10.16)
WBC # BLD AUTO: 5.27 THOUSAND/UL (ref 4.31–10.16)
WBC # BLD AUTO: 5.44 THOUSAND/UL (ref 4.31–10.16)
WBC # BLD AUTO: 5.49 THOUSAND/UL (ref 4.31–10.16)
WBC # BLD AUTO: 5.59 THOUSAND/UL (ref 4.31–10.16)
WBC # BLD AUTO: 5.59 THOUSAND/UL (ref 4.31–10.16)
WBC # BLD AUTO: 5.89 THOUSAND/UL (ref 4.31–10.16)
WBC # BLD AUTO: 5.92 THOUSAND/UL (ref 4.31–10.16)
WBC # FLD MANUAL: 616 /UL

## 2022-01-01 PROCEDURE — 99222 1ST HOSP IP/OBS MODERATE 55: CPT | Performed by: INTERNAL MEDICINE

## 2022-01-01 PROCEDURE — 82728 ASSAY OF FERRITIN: CPT | Performed by: INTERNAL MEDICINE

## 2022-01-01 PROCEDURE — 82948 REAGENT STRIP/BLOOD GLUCOSE: CPT

## 2022-01-01 PROCEDURE — 99239 HOSP IP/OBS DSCHRG MGMT >30: CPT | Performed by: INTERNAL MEDICINE

## 2022-01-01 PROCEDURE — 84145 PROCALCITONIN (PCT): CPT | Performed by: INTERNAL MEDICINE

## 2022-01-01 PROCEDURE — 84443 ASSAY THYROID STIM HORMONE: CPT | Performed by: NURSE PRACTITIONER

## 2022-01-01 PROCEDURE — 5A1D70Z PERFORMANCE OF URINARY FILTRATION, INTERMITTENT, LESS THAN 6 HOURS PER DAY: ICD-10-PCS | Performed by: INTERNAL MEDICINE

## 2022-01-01 PROCEDURE — 93000 ELECTROCARDIOGRAM COMPLETE: CPT | Performed by: NURSE PRACTITIONER

## 2022-01-01 PROCEDURE — 85610 PROTHROMBIN TIME: CPT | Performed by: INTERNAL MEDICINE

## 2022-01-01 PROCEDURE — 85027 COMPLETE CBC AUTOMATED: CPT

## 2022-01-01 PROCEDURE — 89051 BODY FLUID CELL COUNT: CPT | Performed by: INTERNAL MEDICINE

## 2022-01-01 PROCEDURE — 87493 C DIFF AMPLIFIED PROBE: CPT | Performed by: EMERGENCY MEDICINE

## 2022-01-01 PROCEDURE — 36415 COLL VENOUS BLD VENIPUNCTURE: CPT | Performed by: EMERGENCY MEDICINE

## 2022-01-01 PROCEDURE — 36905 THRMBC/NFS DIALYSIS CIRCUIT: CPT | Performed by: RADIOLOGY

## 2022-01-01 PROCEDURE — 99285 EMERGENCY DEPT VISIT HI MDM: CPT | Performed by: EMERGENCY MEDICINE

## 2022-01-01 PROCEDURE — 99223 1ST HOSP IP/OBS HIGH 75: CPT | Performed by: INTERNAL MEDICINE

## 2022-01-01 PROCEDURE — 49083 ABD PARACENTESIS W/IMAGING: CPT | Performed by: INTERNAL MEDICINE

## 2022-01-01 PROCEDURE — 82140 ASSAY OF AMMONIA: CPT | Performed by: EMERGENCY MEDICINE

## 2022-01-01 PROCEDURE — 36905 THRMBC/NFS DIALYSIS CIRCUIT: CPT

## 2022-01-01 PROCEDURE — 83540 ASSAY OF IRON: CPT | Performed by: INTERNAL MEDICINE

## 2022-01-01 PROCEDURE — 76376 3D RENDER W/INTRP POSTPROCES: CPT

## 2022-01-01 PROCEDURE — 80053 COMPREHEN METABOLIC PANEL: CPT | Performed by: EMERGENCY MEDICINE

## 2022-01-01 PROCEDURE — 85027 COMPLETE CBC AUTOMATED: CPT | Performed by: INTERNAL MEDICINE

## 2022-01-01 PROCEDURE — 99232 SBSQ HOSP IP/OBS MODERATE 35: CPT | Performed by: INTERNAL MEDICINE

## 2022-01-01 PROCEDURE — 36415 COLL VENOUS BLD VENIPUNCTURE: CPT

## 2022-01-01 PROCEDURE — 85730 THROMBOPLASTIN TIME PARTIAL: CPT | Performed by: EMERGENCY MEDICINE

## 2022-01-01 PROCEDURE — 85025 COMPLETE CBC W/AUTO DIFF WBC: CPT | Performed by: EMERGENCY MEDICINE

## 2022-01-01 PROCEDURE — 86704 HEP B CORE ANTIBODY TOTAL: CPT | Performed by: INTERNAL MEDICINE

## 2022-01-01 PROCEDURE — 97116 GAIT TRAINING THERAPY: CPT

## 2022-01-01 PROCEDURE — G0439 PPPS, SUBSEQ VISIT: HCPCS | Performed by: NURSE PRACTITIONER

## 2022-01-01 PROCEDURE — A9585 GADOBUTROL INJECTION: HCPCS | Performed by: INTERNAL MEDICINE

## 2022-01-01 PROCEDURE — 0W9G3ZZ DRAINAGE OF PERITONEAL CAVITY, PERCUTANEOUS APPROACH: ICD-10-PCS | Performed by: INTERNAL MEDICINE

## 2022-01-01 PROCEDURE — 99205 OFFICE O/P NEW HI 60 MIN: CPT | Performed by: INTERNAL MEDICINE

## 2022-01-01 PROCEDURE — 3078F DIAST BP <80 MM HG: CPT | Performed by: INTERNAL MEDICINE

## 2022-01-01 PROCEDURE — 93010 ELECTROCARDIOGRAM REPORT: CPT | Performed by: INTERNAL MEDICINE

## 2022-01-01 PROCEDURE — 99214 OFFICE O/P EST MOD 30 MIN: CPT | Performed by: PHYSICIAN ASSISTANT

## 2022-01-01 PROCEDURE — 83690 ASSAY OF LIPASE: CPT | Performed by: EMERGENCY MEDICINE

## 2022-01-01 PROCEDURE — 86381 MITOCHONDRIAL ANTIBODY EACH: CPT | Performed by: INTERNAL MEDICINE

## 2022-01-01 PROCEDURE — 80076 HEPATIC FUNCTION PANEL: CPT | Performed by: PHYSICIAN ASSISTANT

## 2022-01-01 PROCEDURE — 84100 ASSAY OF PHOSPHORUS: CPT | Performed by: INTERNAL MEDICINE

## 2022-01-01 PROCEDURE — 84484 ASSAY OF TROPONIN QUANT: CPT | Performed by: EMERGENCY MEDICINE

## 2022-01-01 PROCEDURE — 0241U HB NFCT DS VIR RESP RNA 4 TRGT: CPT | Performed by: EMERGENCY MEDICINE

## 2022-01-01 PROCEDURE — 85610 PROTHROMBIN TIME: CPT | Performed by: EMERGENCY MEDICINE

## 2022-01-01 PROCEDURE — 99153 MOD SED SAME PHYS/QHP EA: CPT

## 2022-01-01 PROCEDURE — 99285 EMERGENCY DEPT VISIT HI MDM: CPT

## 2022-01-01 PROCEDURE — 93005 ELECTROCARDIOGRAM TRACING: CPT

## 2022-01-01 PROCEDURE — 82042 OTHER SOURCE ALBUMIN QUAN EA: CPT | Performed by: INTERNAL MEDICINE

## 2022-01-01 PROCEDURE — 1036F TOBACCO NON-USER: CPT | Performed by: INTERNAL MEDICINE

## 2022-01-01 PROCEDURE — 99214 OFFICE O/P EST MOD 30 MIN: CPT | Performed by: NURSE PRACTITIONER

## 2022-01-01 PROCEDURE — 82746 ASSAY OF FOLIC ACID SERUM: CPT | Performed by: INTERNAL MEDICINE

## 2022-01-01 PROCEDURE — 80053 COMPREHEN METABOLIC PANEL: CPT | Performed by: INTERNAL MEDICINE

## 2022-01-01 PROCEDURE — 86038 ANTINUCLEAR ANTIBODIES: CPT | Performed by: INTERNAL MEDICINE

## 2022-01-01 PROCEDURE — G1004 CDSM NDSC: HCPCS

## 2022-01-01 PROCEDURE — 83550 IRON BINDING TEST: CPT | Performed by: INTERNAL MEDICINE

## 2022-01-01 PROCEDURE — 99233 SBSQ HOSP IP/OBS HIGH 50: CPT | Performed by: INTERNAL MEDICINE

## 2022-01-01 PROCEDURE — 85025 COMPLETE CBC W/AUTO DIFF WBC: CPT | Performed by: INTERNAL MEDICINE

## 2022-01-01 PROCEDURE — 82553 CREATINE MB FRACTION: CPT | Performed by: EMERGENCY MEDICINE

## 2022-01-01 PROCEDURE — 82945 GLUCOSE OTHER FLUID: CPT | Performed by: INTERNAL MEDICINE

## 2022-01-01 PROCEDURE — 76937 US GUIDE VASCULAR ACCESS: CPT | Performed by: RADIOLOGY

## 2022-01-01 PROCEDURE — 99152 MOD SED SAME PHYS/QHP 5/>YRS: CPT

## 2022-01-01 PROCEDURE — 71045 X-RAY EXAM CHEST 1 VIEW: CPT

## 2022-01-01 PROCEDURE — C1769 GUIDE WIRE: HCPCS

## 2022-01-01 PROCEDURE — 83735 ASSAY OF MAGNESIUM: CPT | Performed by: EMERGENCY MEDICINE

## 2022-01-01 PROCEDURE — 99495 TRANSJ CARE MGMT MOD F2F 14D: CPT | Performed by: NURSE PRACTITIONER

## 2022-01-01 PROCEDURE — 90935 HEMODIALYSIS ONE EVALUATION: CPT | Performed by: INTERNAL MEDICINE

## 2022-01-01 PROCEDURE — 84157 ASSAY OF PROTEIN OTHER: CPT | Performed by: INTERNAL MEDICINE

## 2022-01-01 PROCEDURE — 400013 VN SOC

## 2022-01-01 PROCEDURE — 87505 NFCT AGENT DETECTION GI: CPT | Performed by: EMERGENCY MEDICINE

## 2022-01-01 PROCEDURE — 80048 BASIC METABOLIC PNL TOTAL CA: CPT

## 2022-01-01 PROCEDURE — 80048 BASIC METABOLIC PNL TOTAL CA: CPT | Performed by: INTERNAL MEDICINE

## 2022-01-01 PROCEDURE — 82140 ASSAY OF AMMONIA: CPT | Performed by: INTERNAL MEDICINE

## 2022-01-01 PROCEDURE — 87340 HEPATITIS B SURFACE AG IA: CPT | Performed by: INTERNAL MEDICINE

## 2022-01-01 PROCEDURE — 92610 EVALUATE SWALLOWING FUNCTION: CPT

## 2022-01-01 PROCEDURE — 3074F SYST BP LT 130 MM HG: CPT | Performed by: PHYSICIAN ASSISTANT

## 2022-01-01 PROCEDURE — 82550 ASSAY OF CK (CPK): CPT | Performed by: EMERGENCY MEDICINE

## 2022-01-01 PROCEDURE — 87205 SMEAR GRAM STAIN: CPT | Performed by: INTERNAL MEDICINE

## 2022-01-01 PROCEDURE — 87081 CULTURE SCREEN ONLY: CPT | Performed by: INTERNAL MEDICINE

## 2022-01-01 PROCEDURE — 97163 PT EVAL HIGH COMPLEX 45 MIN: CPT

## 2022-01-01 PROCEDURE — 82105 ALPHA-FETOPROTEIN SERUM: CPT | Performed by: PHYSICIAN ASSISTANT

## 2022-01-01 PROCEDURE — 87635 SARS-COV-2 COVID-19 AMP PRB: CPT | Performed by: INTERNAL MEDICINE

## 2022-01-01 PROCEDURE — 99152 MOD SED SAME PHYS/QHP 5/>YRS: CPT | Performed by: RADIOLOGY

## 2022-01-01 PROCEDURE — 88112 CYTOPATH CELL ENHANCE TECH: CPT | Performed by: PATHOLOGY

## 2022-01-01 PROCEDURE — 3288F FALL RISK ASSESSMENT DOCD: CPT | Performed by: NURSE PRACTITIONER

## 2022-01-01 PROCEDURE — 74183 MRI ABD W/O CNTR FLWD CNTR: CPT

## 2022-01-01 PROCEDURE — 96365 THER/PROPH/DIAG IV INF INIT: CPT

## 2022-01-01 PROCEDURE — 85610 PROTHROMBIN TIME: CPT | Performed by: CHIROPRACTOR

## 2022-01-01 PROCEDURE — 82805 BLOOD GASES W/O2 SATURATION: CPT | Performed by: EMERGENCY MEDICINE

## 2022-01-01 PROCEDURE — 88305 TISSUE EXAM BY PATHOLOGIST: CPT | Performed by: PATHOLOGY

## 2022-01-01 PROCEDURE — 99291 CRITICAL CARE FIRST HOUR: CPT | Performed by: EMERGENCY MEDICINE

## 2022-01-01 PROCEDURE — 87040 BLOOD CULTURE FOR BACTERIA: CPT | Performed by: EMERGENCY MEDICINE

## 2022-01-01 PROCEDURE — 86015 ACTIN ANTIBODY EACH: CPT | Performed by: INTERNAL MEDICINE

## 2022-01-01 PROCEDURE — 3079F DIAST BP 80-89 MM HG: CPT | Performed by: PHYSICIAN ASSISTANT

## 2022-01-01 PROCEDURE — 85730 THROMBOPLASTIN TIME PARTIAL: CPT | Performed by: CHIROPRACTOR

## 2022-01-01 PROCEDURE — 83880 ASSAY OF NATRIURETIC PEPTIDE: CPT | Performed by: EMERGENCY MEDICINE

## 2022-01-01 PROCEDURE — 1036F TOBACCO NON-USER: CPT | Performed by: PHYSICIAN ASSISTANT

## 2022-01-01 PROCEDURE — 96375 TX/PRO/DX INJ NEW DRUG ADDON: CPT

## 2022-01-01 PROCEDURE — 83615 LACTATE (LD) (LDH) ENZYME: CPT | Performed by: INTERNAL MEDICINE

## 2022-01-01 PROCEDURE — 49083 ABD PARACENTESIS W/IMAGING: CPT

## 2022-01-01 PROCEDURE — C1894 INTRO/SHEATH, NON-LASER: HCPCS

## 2022-01-01 PROCEDURE — G0257 UNSCHED DIALYSIS ESRD PT HOS: HCPCS

## 2022-01-01 PROCEDURE — 99239 HOSP IP/OBS DSCHRG MGMT >30: CPT | Performed by: PHYSICIAN ASSISTANT

## 2022-01-01 PROCEDURE — 83605 ASSAY OF LACTIC ACID: CPT | Performed by: EMERGENCY MEDICINE

## 2022-01-01 PROCEDURE — 84100 ASSAY OF PHOSPHORUS: CPT | Performed by: EMERGENCY MEDICINE

## 2022-01-01 PROCEDURE — 99284 EMERGENCY DEPT VISIT MOD MDM: CPT | Performed by: PHYSICIAN ASSISTANT

## 2022-01-01 PROCEDURE — 76705 ECHO EXAM OF ABDOMEN: CPT

## 2022-01-01 PROCEDURE — 1160F RVW MEDS BY RX/DR IN RCRD: CPT | Performed by: PHYSICIAN ASSISTANT

## 2022-01-01 PROCEDURE — 85610 PROTHROMBIN TIME: CPT

## 2022-01-01 PROCEDURE — 83735 ASSAY OF MAGNESIUM: CPT | Performed by: INTERNAL MEDICINE

## 2022-01-01 PROCEDURE — 99214 OFFICE O/P EST MOD 30 MIN: CPT | Performed by: INTERNAL MEDICINE

## 2022-01-01 PROCEDURE — 1160F RVW MEDS BY RX/DR IN RCRD: CPT | Performed by: NURSE PRACTITIONER

## 2022-01-01 PROCEDURE — G0151 HHCP-SERV OF PT,EA 15 MIN: HCPCS

## 2022-01-01 PROCEDURE — 93000 ELECTROCARDIOGRAM COMPLETE: CPT | Performed by: INTERNAL MEDICINE

## 2022-01-01 PROCEDURE — 99024 POSTOP FOLLOW-UP VISIT: CPT | Performed by: RADIOLOGY

## 2022-01-01 PROCEDURE — 80076 HEPATIC FUNCTION PANEL: CPT

## 2022-01-01 PROCEDURE — 86705 HEP B CORE ANTIBODY IGM: CPT | Performed by: INTERNAL MEDICINE

## 2022-01-01 PROCEDURE — 97165 OT EVAL LOW COMPLEX 30 MIN: CPT

## 2022-01-01 PROCEDURE — 1125F AMNT PAIN NOTED PAIN PRSNT: CPT | Performed by: NURSE PRACTITIONER

## 2022-01-01 PROCEDURE — 86803 HEPATITIS C AB TEST: CPT | Performed by: INTERNAL MEDICINE

## 2022-01-01 PROCEDURE — 84484 ASSAY OF TROPONIN QUANT: CPT | Performed by: CHIROPRACTOR

## 2022-01-01 PROCEDURE — 82103 ALPHA-1-ANTITRYPSIN TOTAL: CPT | Performed by: INTERNAL MEDICINE

## 2022-01-01 PROCEDURE — 3074F SYST BP LT 130 MM HG: CPT | Performed by: INTERNAL MEDICINE

## 2022-01-01 PROCEDURE — 74176 CT ABD & PELVIS W/O CONTRAST: CPT

## 2022-01-01 PROCEDURE — 3725F SCREEN DEPRESSION PERFORMED: CPT | Performed by: NURSE PRACTITIONER

## 2022-01-01 PROCEDURE — 83880 ASSAY OF NATRIURETIC PEPTIDE: CPT

## 2022-01-01 PROCEDURE — 84145 PROCALCITONIN (PCT): CPT | Performed by: CHIROPRACTOR

## 2022-01-01 PROCEDURE — 84443 ASSAY THYROID STIM HORMONE: CPT | Performed by: INTERNAL MEDICINE

## 2022-01-01 PROCEDURE — 99222 1ST HOSP IP/OBS MODERATE 55: CPT | Performed by: HOSPITALIST

## 2022-01-01 PROCEDURE — 99232 SBSQ HOSP IP/OBS MODERATE 35: CPT | Performed by: PHYSICIAN ASSISTANT

## 2022-01-01 PROCEDURE — C1725 CATH, TRANSLUMIN NON-LASER: HCPCS

## 2022-01-01 PROCEDURE — 83735 ASSAY OF MAGNESIUM: CPT | Performed by: NURSE PRACTITIONER

## 2022-01-01 PROCEDURE — 99284 EMERGENCY DEPT VISIT MOD MDM: CPT

## 2022-01-01 PROCEDURE — 85018 HEMOGLOBIN: CPT | Performed by: INTERNAL MEDICINE

## 2022-01-01 PROCEDURE — 87070 CULTURE OTHR SPECIMN AEROBIC: CPT | Performed by: INTERNAL MEDICINE

## 2022-01-01 PROCEDURE — 97166 OT EVAL MOD COMPLEX 45 MIN: CPT

## 2022-01-01 PROCEDURE — 1160F RVW MEDS BY RX/DR IN RCRD: CPT | Performed by: INTERNAL MEDICINE

## 2022-01-01 PROCEDURE — 1170F FXNL STATUS ASSESSED: CPT | Performed by: NURSE PRACTITIONER

## 2022-01-01 PROCEDURE — 93306 TTE W/DOPPLER COMPLETE: CPT

## 2022-01-01 PROCEDURE — 82390 ASSAY OF CERULOPLASMIN: CPT | Performed by: INTERNAL MEDICINE

## 2022-01-01 PROCEDURE — 93306 TTE W/DOPPLER COMPLETE: CPT | Performed by: INTERNAL MEDICINE

## 2022-01-01 PROCEDURE — 82607 VITAMIN B-12: CPT | Performed by: INTERNAL MEDICINE

## 2022-01-01 PROCEDURE — 1111F DSCHRG MED/CURRENT MED MERGE: CPT | Performed by: NURSE PRACTITIONER

## 2022-01-01 PROCEDURE — C1757 CATH, THROMBECTOMY/EMBOLECT: HCPCS

## 2022-01-01 RX ORDER — SODIUM CHLORIDE 9 MG/ML
20 INJECTION, SOLUTION INTRAVENOUS ONCE
Status: CANCELLED | OUTPATIENT
Start: 2022-01-01

## 2022-01-01 RX ORDER — ATORVASTATIN CALCIUM 40 MG/1
40 TABLET, FILM COATED ORAL
Status: DISCONTINUED | OUTPATIENT
Start: 2022-01-01 | End: 2022-01-01 | Stop reason: HOSPADM

## 2022-01-01 RX ORDER — DIPHENHYDRAMINE HCL 25 MG
25 TABLET ORAL EVERY 6 HOURS PRN
Status: DISCONTINUED | OUTPATIENT
Start: 2022-01-01 | End: 2022-01-01 | Stop reason: HOSPADM

## 2022-01-01 RX ORDER — OXYCODONE HYDROCHLORIDE 5 MG/1
2.5 TABLET ORAL 3 TIMES DAILY PRN
Qty: 30 TABLET | Refills: 0 | Status: SHIPPED | OUTPATIENT
Start: 2022-01-01

## 2022-01-01 RX ORDER — LACTULOSE 20 G/30ML
20 SOLUTION ORAL 2 TIMES DAILY
Status: DISCONTINUED | OUTPATIENT
Start: 2022-01-01 | End: 2022-01-01

## 2022-01-01 RX ORDER — DOXERCALCIFEROL 2 UG/ML
2 INJECTION, SOLUTION INTRAVENOUS ONCE
Status: COMPLETED | OUTPATIENT
Start: 2022-01-01 | End: 2022-01-01

## 2022-01-01 RX ORDER — MIDODRINE HYDROCHLORIDE 10 MG/1
10 TABLET ORAL
Qty: 90 TABLET | Refills: 0 | Status: SHIPPED | OUTPATIENT
Start: 2022-01-01 | End: 2022-01-01 | Stop reason: SDUPTHER

## 2022-01-01 RX ORDER — LACTULOSE 20 G/30ML
20 SOLUTION ORAL DAILY
Status: DISCONTINUED | OUTPATIENT
Start: 2022-01-01 | End: 2022-01-01

## 2022-01-01 RX ORDER — CEFDINIR 300 MG/1
300 CAPSULE ORAL EVERY 24 HOURS
Qty: 1 CAPSULE | Refills: 0 | Status: SHIPPED | OUTPATIENT
Start: 2022-01-01 | End: 2022-01-01

## 2022-01-01 RX ORDER — HEPARIN SODIUM 1000 [USP'U]/ML
INJECTION, SOLUTION INTRAVENOUS; SUBCUTANEOUS CODE/TRAUMA/SEDATION MEDICATION
Status: COMPLETED | OUTPATIENT
Start: 2022-01-01 | End: 2022-01-01

## 2022-01-01 RX ORDER — LANOLIN ALCOHOL/MO/W.PET/CERES
3 CREAM (GRAM) TOPICAL
Status: DISCONTINUED | OUTPATIENT
Start: 2022-01-01 | End: 2022-01-01

## 2022-01-01 RX ORDER — CLOTRIMAZOLE 1 %
CREAM (GRAM) TOPICAL 2 TIMES DAILY
Status: DISCONTINUED | OUTPATIENT
Start: 2022-01-01 | End: 2022-01-01 | Stop reason: HOSPADM

## 2022-01-01 RX ORDER — PANTOPRAZOLE SODIUM 40 MG/1
40 TABLET, DELAYED RELEASE ORAL
Status: DISCONTINUED | OUTPATIENT
Start: 2022-01-01 | End: 2022-01-01 | Stop reason: HOSPADM

## 2022-01-01 RX ORDER — ONDANSETRON 2 MG/ML
4 INJECTION INTRAMUSCULAR; INTRAVENOUS EVERY 4 HOURS PRN
Status: DISCONTINUED | OUTPATIENT
Start: 2022-01-01 | End: 2022-01-01

## 2022-01-01 RX ORDER — METOPROLOL SUCCINATE 25 MG/1
25 TABLET, EXTENDED RELEASE ORAL 2 TIMES DAILY
Status: DISCONTINUED | OUTPATIENT
Start: 2022-01-01 | End: 2022-01-01

## 2022-01-01 RX ORDER — GABAPENTIN 100 MG/1
100 CAPSULE ORAL
Status: DISCONTINUED | OUTPATIENT
Start: 2022-01-01 | End: 2022-01-01 | Stop reason: HOSPADM

## 2022-01-01 RX ORDER — LACTULOSE 20 G/30ML
20 SOLUTION ORAL DAILY
Qty: 900 ML | Refills: 0 | Status: SHIPPED | OUTPATIENT
Start: 2022-01-01 | End: 2022-01-01 | Stop reason: SDUPTHER

## 2022-01-01 RX ORDER — ALBUMIN, HUMAN INJ 5% 5 %
12.5 SOLUTION INTRAVENOUS ONCE
Status: COMPLETED | OUTPATIENT
Start: 2022-01-01 | End: 2022-01-01

## 2022-01-01 RX ORDER — ATORVASTATIN CALCIUM 40 MG/1
40 TABLET, FILM COATED ORAL DAILY
Status: DISCONTINUED | OUTPATIENT
Start: 2022-01-01 | End: 2022-01-01 | Stop reason: HOSPADM

## 2022-01-01 RX ORDER — LACTULOSE 20 G/30ML
20 SOLUTION ORAL DAILY
Status: DISCONTINUED | OUTPATIENT
Start: 2022-01-01 | End: 2022-01-01 | Stop reason: HOSPADM

## 2022-01-01 RX ORDER — MIDODRINE HYDROCHLORIDE 5 MG/1
10 TABLET ORAL
Status: DISCONTINUED | OUTPATIENT
Start: 2022-01-01 | End: 2022-01-01 | Stop reason: HOSPADM

## 2022-01-01 RX ORDER — METOPROLOL SUCCINATE 25 MG/1
25 TABLET, EXTENDED RELEASE ORAL 2 TIMES DAILY
Status: DISCONTINUED | OUTPATIENT
Start: 2022-01-01 | End: 2022-01-01 | Stop reason: HOSPADM

## 2022-01-01 RX ORDER — ATORVASTATIN CALCIUM 40 MG/1
40 TABLET, FILM COATED ORAL DAILY
Qty: 90 TABLET | Refills: 0 | Status: SHIPPED | OUTPATIENT
Start: 2022-01-01

## 2022-01-01 RX ORDER — HEPARIN SODIUM 5000 [USP'U]/ML
5000 INJECTION, SOLUTION INTRAVENOUS; SUBCUTANEOUS EVERY 8 HOURS SCHEDULED
Status: DISCONTINUED | OUTPATIENT
Start: 2022-01-01 | End: 2022-01-01

## 2022-01-01 RX ORDER — MIDODRINE HYDROCHLORIDE 5 MG/1
5 TABLET ORAL
Status: COMPLETED | OUTPATIENT
Start: 2022-01-01 | End: 2022-01-01

## 2022-01-01 RX ORDER — MIDODRINE HYDROCHLORIDE 5 MG/1
5 TABLET ORAL
Qty: 90 TABLET | Refills: 0 | Status: SHIPPED | OUTPATIENT
Start: 2022-01-01 | End: 2022-01-01

## 2022-01-01 RX ORDER — MIDODRINE HYDROCHLORIDE 2.5 MG/1
2.5 TABLET ORAL
Status: DISCONTINUED | OUTPATIENT
Start: 2022-01-01 | End: 2022-01-01

## 2022-01-01 RX ORDER — DOXERCALCIFEROL 2 UG/ML
3 INJECTION, SOLUTION INTRAVENOUS 3 TIMES WEEKLY
Status: DISCONTINUED | OUTPATIENT
Start: 2022-01-01 | End: 2022-01-01 | Stop reason: HOSPADM

## 2022-01-01 RX ORDER — METOPROLOL SUCCINATE 25 MG/1
TABLET, EXTENDED RELEASE ORAL
Qty: 30 TABLET | Refills: 3 | Status: SHIPPED | OUTPATIENT
Start: 2022-01-01 | End: 2022-01-01 | Stop reason: HOSPADM

## 2022-01-01 RX ORDER — METRONIDAZOLE 500 MG/100ML
500 INJECTION, SOLUTION INTRAVENOUS EVERY 8 HOURS
Status: DISCONTINUED | OUTPATIENT
Start: 2022-01-01 | End: 2022-01-01

## 2022-01-01 RX ORDER — BUMETANIDE 2 MG/1
4 TABLET ORAL 2 TIMES DAILY
Qty: 120 TABLET | Refills: 5 | Status: SHIPPED | OUTPATIENT
Start: 2022-01-01 | End: 2022-01-01 | Stop reason: HOSPADM

## 2022-01-01 RX ORDER — PANTOPRAZOLE SODIUM 40 MG/1
40 TABLET, DELAYED RELEASE ORAL
Qty: 30 TABLET | Refills: 2 | Status: SHIPPED | OUTPATIENT
Start: 2022-01-01 | End: 2022-01-01 | Stop reason: SDUPTHER

## 2022-01-01 RX ORDER — MIDODRINE HYDROCHLORIDE 5 MG/1
5 TABLET ORAL
Status: DISCONTINUED | OUTPATIENT
Start: 2022-01-01 | End: 2022-01-01

## 2022-01-01 RX ORDER — ATORVASTATIN CALCIUM 40 MG/1
40 TABLET, FILM COATED ORAL DAILY
Qty: 90 TABLET | Refills: 0 | Status: SHIPPED | OUTPATIENT
Start: 2022-01-01 | End: 2022-01-01

## 2022-01-01 RX ORDER — LACTULOSE 20 G/30ML
20 SOLUTION ORAL 3 TIMES DAILY
Status: DISCONTINUED | OUTPATIENT
Start: 2022-01-01 | End: 2022-01-01

## 2022-01-01 RX ORDER — DOXERCALCIFEROL 2 UG/ML
3 INJECTION, SOLUTION INTRAVENOUS
Qty: 2 ML | Refills: 0
Start: 2022-01-01

## 2022-01-01 RX ORDER — MIDODRINE HYDROCHLORIDE 5 MG/1
5 TABLET ORAL
Status: DISCONTINUED | OUTPATIENT
Start: 2022-01-01 | End: 2022-01-01 | Stop reason: HOSPADM

## 2022-01-01 RX ORDER — POTASSIUM CHLORIDE 20 MEQ/1
20 TABLET, EXTENDED RELEASE ORAL ONCE
Status: COMPLETED | OUTPATIENT
Start: 2022-01-01 | End: 2022-01-01

## 2022-01-01 RX ORDER — POTASSIUM CHLORIDE 20 MEQ/1
40 TABLET, EXTENDED RELEASE ORAL ONCE
Status: DISCONTINUED | OUTPATIENT
Start: 2022-01-01 | End: 2022-01-01

## 2022-01-01 RX ORDER — MIDODRINE HYDROCHLORIDE 5 MG/1
10 TABLET ORAL
Status: DISCONTINUED | OUTPATIENT
Start: 2022-01-01 | End: 2022-01-01

## 2022-01-01 RX ORDER — LACTULOSE 20 G/30ML
20 SOLUTION ORAL EVERY 6 HOURS
Status: DISCONTINUED | OUTPATIENT
Start: 2022-01-01 | End: 2022-01-01

## 2022-01-01 RX ORDER — ALBUMIN (HUMAN) 12.5 G/50ML
12.5 SOLUTION INTRAVENOUS ONCE
Status: COMPLETED | OUTPATIENT
Start: 2022-01-01 | End: 2022-01-01

## 2022-01-01 RX ORDER — KETOCONAZOLE 20 MG/G
CREAM TOPICAL DAILY
Status: DISCONTINUED | OUTPATIENT
Start: 2022-01-01 | End: 2022-01-01

## 2022-01-01 RX ORDER — DOCUSATE SODIUM 100 MG/1
CAPSULE, LIQUID FILLED ORAL
Qty: 30 CAPSULE | Refills: 3 | Status: SHIPPED | OUTPATIENT
Start: 2022-01-01 | End: 2022-01-01

## 2022-01-01 RX ORDER — CALCIUM GLUCONATE 20 MG/ML
2 INJECTION, SOLUTION INTRAVENOUS ONCE
Status: COMPLETED | OUTPATIENT
Start: 2022-01-01 | End: 2022-01-01

## 2022-01-01 RX ORDER — LIDOCAINE 40 MG/G
CREAM TOPICAL
Status: DISCONTINUED | OUTPATIENT
Start: 2022-01-01 | End: 2022-01-01 | Stop reason: HOSPADM

## 2022-01-01 RX ORDER — HEPARIN SODIUM 1000 [USP'U]/ML
1000 INJECTION, SOLUTION INTRAVENOUS; SUBCUTANEOUS ONCE AS NEEDED
Status: DISCONTINUED | OUTPATIENT
Start: 2022-01-01 | End: 2022-01-01 | Stop reason: HOSPADM

## 2022-01-01 RX ORDER — ACETAMINOPHEN 325 MG/1
650 TABLET ORAL EVERY 6 HOURS PRN
Status: DISCONTINUED | OUTPATIENT
Start: 2022-01-01 | End: 2022-01-01 | Stop reason: HOSPADM

## 2022-01-01 RX ORDER — METOPROLOL SUCCINATE 25 MG/1
25 TABLET, EXTENDED RELEASE ORAL 2 TIMES DAILY
Qty: 60 TABLET | Refills: 0 | Status: ON HOLD | OUTPATIENT
Start: 2022-01-01 | End: 2022-01-01 | Stop reason: SDUPTHER

## 2022-01-01 RX ORDER — ALBUMIN, HUMAN INJ 5% 5 %
25 SOLUTION INTRAVENOUS ONCE
Status: DISCONTINUED | OUTPATIENT
Start: 2022-01-01 | End: 2022-01-01

## 2022-01-01 RX ORDER — DOCUSATE SODIUM 100 MG/1
100 CAPSULE, LIQUID FILLED ORAL 2 TIMES DAILY
Status: DISCONTINUED | OUTPATIENT
Start: 2022-01-01 | End: 2022-01-01 | Stop reason: HOSPADM

## 2022-01-01 RX ORDER — OXYCODONE HYDROCHLORIDE 5 MG/1
2.5 TABLET ORAL 3 TIMES DAILY PRN
Status: DISCONTINUED | OUTPATIENT
Start: 2022-01-01 | End: 2022-01-01

## 2022-01-01 RX ORDER — POTASSIUM CHLORIDE 20 MEQ/1
TABLET, EXTENDED RELEASE ORAL
Status: DISPENSED
Start: 2022-01-01 | End: 2022-01-01

## 2022-01-01 RX ORDER — PANTOPRAZOLE SODIUM 40 MG/1
40 TABLET, DELAYED RELEASE ORAL
Qty: 30 TABLET | Refills: 2 | Status: SHIPPED | OUTPATIENT
Start: 2022-01-01

## 2022-01-01 RX ORDER — METOPROLOL SUCCINATE 25 MG/1
12.5 TABLET, EXTENDED RELEASE ORAL 2 TIMES DAILY
Qty: 60 TABLET | Refills: 0 | Status: SHIPPED | OUTPATIENT
Start: 2022-01-01

## 2022-01-01 RX ORDER — METOPROLOL SUCCINATE 25 MG/1
12.5 TABLET, EXTENDED RELEASE ORAL 2 TIMES DAILY
Qty: 60 TABLET | Refills: 0
Start: 2022-01-01 | End: 2022-01-01 | Stop reason: SDUPTHER

## 2022-01-01 RX ORDER — LANOLIN ALCOHOL/MO/W.PET/CERES
3 CREAM (GRAM) TOPICAL
Status: DISCONTINUED | OUTPATIENT
Start: 2022-01-01 | End: 2022-01-01 | Stop reason: HOSPADM

## 2022-01-01 RX ORDER — KETOCONAZOLE 20 MG/G
CREAM TOPICAL DAILY
Qty: 30 G | Refills: 2 | Status: SHIPPED | OUTPATIENT
Start: 2022-01-01

## 2022-01-01 RX ORDER — ALBUMIN (HUMAN) 12.5 G/50ML
12.5 SOLUTION INTRAVENOUS ONCE
Status: CANCELLED | OUTPATIENT
Start: 2022-01-01 | End: 2022-01-01

## 2022-01-01 RX ORDER — LIDOCAINE AND PRILOCAINE 25; 25 MG/G; MG/G
CREAM TOPICAL AS NEEDED
Qty: 30 G | Refills: 3 | Status: SHIPPED | OUTPATIENT
Start: 2022-01-01

## 2022-01-01 RX ORDER — WARFARIN SODIUM 2 MG/1
TABLET ORAL
Qty: 30 TABLET | Refills: 11 | Status: SHIPPED | OUTPATIENT
Start: 2022-01-01 | End: 2022-01-01

## 2022-01-01 RX ORDER — WARFARIN SODIUM 2 MG/1
TABLET ORAL
COMMUNITY
Start: 2022-01-01 | End: 2022-01-01 | Stop reason: ALTCHOICE

## 2022-01-01 RX ORDER — CHOLECALCIFEROL (VITAMIN D3) 10 MCG
1 TABLET ORAL
Status: DISCONTINUED | OUTPATIENT
Start: 2022-01-01 | End: 2022-01-01 | Stop reason: HOSPADM

## 2022-01-01 RX ORDER — FENTANYL CITRATE 50 UG/ML
INJECTION, SOLUTION INTRAMUSCULAR; INTRAVENOUS CODE/TRAUMA/SEDATION MEDICATION
Status: COMPLETED | OUTPATIENT
Start: 2022-01-01 | End: 2022-01-01

## 2022-01-01 RX ORDER — LACTULOSE 20 G/30ML
20 SOLUTION ORAL DAILY
Qty: 900 ML | Refills: 0 | Status: SHIPPED | OUTPATIENT
Start: 2022-01-01 | End: 2022-01-01

## 2022-01-01 RX ORDER — DOXERCALCIFEROL 0.5 UG/1
2 CAPSULE ORAL
Status: DISCONTINUED | OUTPATIENT
Start: 2022-01-01 | End: 2022-01-01

## 2022-01-01 RX ORDER — HEPARIN SODIUM 10000 [USP'U]/100ML
3-20 INJECTION, SOLUTION INTRAVENOUS
Status: DISCONTINUED | OUTPATIENT
Start: 2022-01-01 | End: 2022-01-01

## 2022-01-01 RX ORDER — ALBUTEROL SULFATE 2.5 MG/3ML
1 SOLUTION RESPIRATORY (INHALATION) ONCE
Status: COMPLETED | OUTPATIENT
Start: 2022-01-01 | End: 2022-01-01

## 2022-01-01 RX ORDER — CEFDINIR 300 MG/1
300 CAPSULE ORAL EVERY 24 HOURS
Qty: 1 CAPSULE | Refills: 0 | Status: SHIPPED | OUTPATIENT
Start: 2022-01-01 | End: 2022-01-01 | Stop reason: SDUPTHER

## 2022-01-01 RX ORDER — MIDODRINE HYDROCHLORIDE 10 MG/1
10 TABLET ORAL
Qty: 90 TABLET | Refills: 0 | Status: SHIPPED | OUTPATIENT
Start: 2022-01-01 | End: 2022-07-29

## 2022-01-01 RX ORDER — DEXTROSE MONOHYDRATE 25 G/50ML
25 INJECTION, SOLUTION INTRAVENOUS ONCE
Status: COMPLETED | OUTPATIENT
Start: 2022-01-01 | End: 2022-01-01

## 2022-01-01 RX ORDER — LIDOCAINE WITH 8.4% SOD BICARB 0.9%(10ML)
SYRINGE (ML) INJECTION CODE/TRAUMA/SEDATION MEDICATION
Status: COMPLETED | OUTPATIENT
Start: 2022-01-01 | End: 2022-01-01

## 2022-01-01 RX ORDER — BUMETANIDE 2 MG/1
TABLET ORAL
COMMUNITY
Start: 2022-01-01 | End: 2022-01-01

## 2022-01-01 RX ORDER — CEFTRIAXONE 1 G/50ML
1000 INJECTION, SOLUTION INTRAVENOUS ONCE
Status: COMPLETED | OUTPATIENT
Start: 2022-01-01 | End: 2022-01-01

## 2022-01-01 RX ORDER — DOCUSATE SODIUM 100 MG/1
100 CAPSULE, LIQUID FILLED ORAL 2 TIMES DAILY
Qty: 180 CAPSULE | Refills: 0 | Status: SHIPPED | OUTPATIENT
Start: 2022-01-01 | End: 2022-08-09

## 2022-01-01 RX ORDER — ALBUMIN (HUMAN) 12.5 G/50ML
25 SOLUTION INTRAVENOUS ONCE AS NEEDED
Status: CANCELLED | OUTPATIENT
Start: 2022-01-01

## 2022-01-01 RX ORDER — DOXERCALCIFEROL 2 UG/ML
3 INJECTION, SOLUTION INTRAVENOUS
Status: DISCONTINUED | OUTPATIENT
Start: 2022-01-01 | End: 2022-01-01 | Stop reason: HOSPADM

## 2022-01-01 RX ORDER — DOCUSATE SODIUM 100 MG/1
CAPSULE, LIQUID FILLED ORAL
Qty: 30 CAPSULE | Refills: 3 | Status: ON HOLD | OUTPATIENT
Start: 2022-01-01 | End: 2022-01-01

## 2022-01-01 RX ORDER — MIDODRINE HYDROCHLORIDE 5 MG/1
10 TABLET ORAL ONCE
Status: COMPLETED | OUTPATIENT
Start: 2022-01-01 | End: 2022-01-01

## 2022-01-01 RX ORDER — KETOCONAZOLE 20 MG/G
CREAM TOPICAL DAILY
Qty: 30 G | Refills: 2 | Status: SHIPPED | OUTPATIENT
Start: 2022-01-01 | End: 2022-01-01

## 2022-01-01 RX ORDER — LACTULOSE 20 G/30ML
10 SOLUTION ORAL DAILY
Status: DISCONTINUED | OUTPATIENT
Start: 2022-01-01 | End: 2022-01-01

## 2022-01-01 RX ORDER — METRONIDAZOLE 500 MG/1
500 TABLET ORAL EVERY 8 HOURS SCHEDULED
Qty: 5 TABLET | Refills: 0 | Status: SHIPPED | OUTPATIENT
Start: 2022-01-01 | End: 2022-01-01

## 2022-01-01 RX ORDER — NYSTATIN 100000 [USP'U]/G
POWDER TOPICAL 3 TIMES DAILY PRN
Qty: 45 G | Refills: 1 | Status: SHIPPED | OUTPATIENT
Start: 2022-01-01

## 2022-01-01 RX ORDER — DOXERCALCIFEROL 2 UG/ML
2.5 INJECTION, SOLUTION INTRAVENOUS
Status: DISCONTINUED | OUTPATIENT
Start: 2022-01-01 | End: 2022-01-01 | Stop reason: HOSPADM

## 2022-01-01 RX ORDER — ALBUMIN (HUMAN) 12.5 G/50ML
25 SOLUTION INTRAVENOUS ONCE
Status: COMPLETED | OUTPATIENT
Start: 2022-01-01 | End: 2022-01-01

## 2022-01-01 RX ORDER — MIDODRINE HYDROCHLORIDE 10 MG/1
10 TABLET ORAL
Qty: 90 TABLET | Refills: 0 | Status: SHIPPED | OUTPATIENT
Start: 2022-01-01 | End: 2022-01-01

## 2022-01-01 RX ORDER — METRONIDAZOLE 500 MG/1
500 TABLET ORAL EVERY 8 HOURS SCHEDULED
Qty: 5 TABLET | Refills: 0 | Status: SHIPPED | OUTPATIENT
Start: 2022-01-01 | End: 2022-01-01 | Stop reason: SDUPTHER

## 2022-01-01 RX ORDER — OXYCODONE HYDROCHLORIDE 5 MG/1
2.5 TABLET ORAL EVERY 6 HOURS PRN
Status: DISCONTINUED | OUTPATIENT
Start: 2022-01-01 | End: 2022-01-01 | Stop reason: HOSPADM

## 2022-01-01 RX ORDER — METRONIDAZOLE 500 MG/1
500 TABLET ORAL EVERY 8 HOURS SCHEDULED
Status: DISCONTINUED | OUTPATIENT
Start: 2022-01-01 | End: 2022-01-01

## 2022-01-01 RX ORDER — MIDAZOLAM HYDROCHLORIDE 2 MG/2ML
INJECTION, SOLUTION INTRAMUSCULAR; INTRAVENOUS CODE/TRAUMA/SEDATION MEDICATION
Status: COMPLETED | OUTPATIENT
Start: 2022-01-01 | End: 2022-01-01

## 2022-01-01 RX ORDER — LIDOCAINE AND PRILOCAINE 25; 25 MG/G; MG/G
CREAM TOPICAL AS NEEDED
Qty: 30 G | Refills: 3 | Status: SHIPPED | OUTPATIENT
Start: 2022-01-01 | End: 2022-01-01 | Stop reason: SDUPTHER

## 2022-01-01 RX ORDER — VANCOMYCIN HYDROCHLORIDE 125 MG/1
125 CAPSULE ORAL 2 TIMES DAILY
Qty: 9 CAPSULE | Refills: 0 | Status: SHIPPED | OUTPATIENT
Start: 2022-01-01 | End: 2022-01-01

## 2022-01-01 RX ORDER — VANCOMYCIN HYDROCHLORIDE 125 MG/1
125 CAPSULE ORAL 2 TIMES DAILY
Qty: 9 CAPSULE | Refills: 0 | Status: SHIPPED | OUTPATIENT
Start: 2022-01-01 | End: 2022-01-01 | Stop reason: SDUPTHER

## 2022-01-01 RX ORDER — METRONIDAZOLE 500 MG/100ML
500 INJECTION, SOLUTION INTRAVENOUS EVERY 8 HOURS
Status: DISCONTINUED | OUTPATIENT
Start: 2022-01-01 | End: 2022-01-01 | Stop reason: HOSPADM

## 2022-01-01 RX ORDER — IPRATROPIUM BROMIDE AND ALBUTEROL SULFATE .5; 3 MG/3ML; MG/3ML
1 SOLUTION RESPIRATORY (INHALATION) ONCE
Status: COMPLETED | OUTPATIENT
Start: 2022-01-01 | End: 2022-01-01

## 2022-01-01 RX ORDER — METOPROLOL SUCCINATE 25 MG/1
12.5 TABLET, EXTENDED RELEASE ORAL 2 TIMES DAILY
Status: DISCONTINUED | OUTPATIENT
Start: 2022-01-01 | End: 2022-01-01 | Stop reason: HOSPADM

## 2022-01-01 RX ORDER — LACTULOSE 20 G/30ML
20 SOLUTION ORAL EVERY 8 HOURS
Status: DISCONTINUED | OUTPATIENT
Start: 2022-01-01 | End: 2022-01-01

## 2022-01-01 RX ADMIN — LACTULOSE 20 G: 10 SOLUTION ORAL at 11:02

## 2022-01-01 RX ADMIN — POTASSIUM CHLORIDE 20 MEQ: 1500 TABLET, EXTENDED RELEASE ORAL at 14:39

## 2022-01-01 RX ADMIN — CLOTRIMAZOLE: 1 CREAM TOPICAL at 09:23

## 2022-01-01 RX ADMIN — FENTANYL CITRATE 50 MCG: 50 INJECTION INTRAMUSCULAR; INTRAVENOUS at 17:41

## 2022-01-01 RX ADMIN — ATORVASTATIN CALCIUM 40 MG: 40 TABLET, FILM COATED ORAL at 14:12

## 2022-01-01 RX ADMIN — CLOTRIMAZOLE: 1 CREAM TOPICAL at 11:15

## 2022-01-01 RX ADMIN — EPOETIN ALFA 2000 UNITS: 2000 SOLUTION INTRAVENOUS; SUBCUTANEOUS at 14:27

## 2022-01-01 RX ADMIN — MIDODRINE HYDROCHLORIDE 10 MG: 5 TABLET ORAL at 11:02

## 2022-01-01 RX ADMIN — MIDODRINE HYDROCHLORIDE 10 MG: 5 TABLET ORAL at 06:36

## 2022-01-01 RX ADMIN — MIDODRINE HYDROCHLORIDE 10 MG: 5 TABLET ORAL at 12:03

## 2022-01-01 RX ADMIN — GADOBUTROL 8 ML: 604.72 INJECTION INTRAVENOUS at 13:08

## 2022-01-01 RX ADMIN — MIDODRINE HYDROCHLORIDE 5 MG: 5 TABLET ORAL at 16:04

## 2022-01-01 RX ADMIN — MIDODRINE HYDROCHLORIDE 10 MG: 5 TABLET ORAL at 12:05

## 2022-01-01 RX ADMIN — PANTOPRAZOLE SODIUM 40 MG: 40 TABLET, DELAYED RELEASE ORAL at 05:48

## 2022-01-01 RX ADMIN — IRON SUCROSE 50 MG: 20 INJECTION, SOLUTION INTRAVENOUS at 15:25

## 2022-01-01 RX ADMIN — MIDODRINE HYDROCHLORIDE 10 MG: 5 TABLET ORAL at 11:16

## 2022-01-01 RX ADMIN — METRONIDAZOLE 500 MG: 500 INJECTION, SOLUTION INTRAVENOUS at 01:02

## 2022-01-01 RX ADMIN — PANTOPRAZOLE SODIUM 40 MG: 40 TABLET, DELAYED RELEASE ORAL at 06:17

## 2022-01-01 RX ADMIN — MIDODRINE HYDROCHLORIDE 5 MG: 5 TABLET ORAL at 13:10

## 2022-01-01 RX ADMIN — Medication 3 MG: at 22:55

## 2022-01-01 RX ADMIN — OXYCODONE HYDROCHLORIDE 2.5 MG: 5 TABLET ORAL at 22:23

## 2022-01-01 RX ADMIN — MIDODRINE HYDROCHLORIDE 10 MG: 5 TABLET ORAL at 06:44

## 2022-01-01 RX ADMIN — MIDODRINE HYDROCHLORIDE 10 MG: 5 TABLET ORAL at 17:55

## 2022-01-01 RX ADMIN — PANTOPRAZOLE SODIUM 40 MG: 40 TABLET, DELAYED RELEASE ORAL at 05:38

## 2022-01-01 RX ADMIN — GABAPENTIN 100 MG: 100 CAPSULE ORAL at 22:03

## 2022-01-01 RX ADMIN — METRONIDAZOLE 500 MG: 500 INJECTION, SOLUTION INTRAVENOUS at 00:31

## 2022-01-01 RX ADMIN — ATORVASTATIN CALCIUM 40 MG: 40 TABLET, FILM COATED ORAL at 18:07

## 2022-01-01 RX ADMIN — MIDODRINE HYDROCHLORIDE 10 MG: 5 TABLET ORAL at 07:45

## 2022-01-01 RX ADMIN — GABAPENTIN 100 MG: 100 CAPSULE ORAL at 21:46

## 2022-01-01 RX ADMIN — EPOETIN ALFA 1800 UNITS: 2000 SOLUTION INTRAVENOUS; SUBCUTANEOUS at 09:24

## 2022-01-01 RX ADMIN — LACTULOSE 20 G: 20 SOLUTION ORAL at 09:51

## 2022-01-01 RX ADMIN — APIXABAN 2.5 MG: 2.5 TABLET, FILM COATED ORAL at 08:41

## 2022-01-01 RX ADMIN — MIDODRINE HYDROCHLORIDE 10 MG: 5 TABLET ORAL at 05:26

## 2022-01-01 RX ADMIN — CEFTRIAXONE 1000 MG: 1 INJECTION, SOLUTION INTRAVENOUS at 03:05

## 2022-01-01 RX ADMIN — MIDODRINE HYDROCHLORIDE 5 MG: 5 TABLET ORAL at 17:31

## 2022-01-01 RX ADMIN — PANTOPRAZOLE SODIUM 40 MG: 40 TABLET, DELAYED RELEASE ORAL at 06:44

## 2022-01-01 RX ADMIN — LACTULOSE 20 G: 20 SOLUTION ORAL at 09:58

## 2022-01-01 RX ADMIN — METRONIDAZOLE 500 MG: 500 INJECTION, SOLUTION INTRAVENOUS at 10:00

## 2022-01-01 RX ADMIN — APIXABAN 2.5 MG: 2.5 TABLET, FILM COATED ORAL at 09:55

## 2022-01-01 RX ADMIN — EPOETIN ALFA 1800 UNITS: 2000 SOLUTION INTRAVENOUS; SUBCUTANEOUS at 13:56

## 2022-01-01 RX ADMIN — APIXABAN 2.5 MG: 2.5 TABLET, FILM COATED ORAL at 18:07

## 2022-01-01 RX ADMIN — Medication 125 MG: at 13:56

## 2022-01-01 RX ADMIN — NOREPINEPHRINE BITARTRATE 4 MCG: 1 INJECTION, SOLUTION, CONCENTRATE INTRAVENOUS at 08:00

## 2022-01-01 RX ADMIN — MIDODRINE HYDROCHLORIDE 2.5 MG: 2.5 TABLET ORAL at 09:10

## 2022-01-01 RX ADMIN — DOXERCALCIFEROL 2 MCG: 4 INJECTION, SOLUTION INTRAVENOUS at 14:27

## 2022-01-01 RX ADMIN — MIDODRINE HYDROCHLORIDE 2.5 MG: 2.5 TABLET ORAL at 16:23

## 2022-01-01 RX ADMIN — INSULIN HUMAN 10 UNITS: 100 INJECTION, SOLUTION PARENTERAL at 11:21

## 2022-01-01 RX ADMIN — GABAPENTIN 100 MG: 100 CAPSULE ORAL at 21:22

## 2022-01-01 RX ADMIN — SODIUM CHLORIDE 500 ML: 0.9 INJECTION, SOLUTION INTRAVENOUS at 06:44

## 2022-01-01 RX ADMIN — ATORVASTATIN CALCIUM 40 MG: 40 TABLET, FILM COATED ORAL at 09:51

## 2022-01-01 RX ADMIN — Medication 125 MG: at 09:58

## 2022-01-01 RX ADMIN — MIDODRINE HYDROCHLORIDE 5 MG: 5 TABLET ORAL at 06:17

## 2022-01-01 RX ADMIN — PANTOPRAZOLE SODIUM 40 MG: 40 TABLET, DELAYED RELEASE ORAL at 05:24

## 2022-01-01 RX ADMIN — Medication 125 MG: at 21:53

## 2022-01-01 RX ADMIN — RIFAXIMIN 550 MG: 550 TABLET ORAL at 21:40

## 2022-01-01 RX ADMIN — ATORVASTATIN CALCIUM 40 MG: 40 TABLET, FILM COATED ORAL at 09:26

## 2022-01-01 RX ADMIN — DOCUSATE SODIUM 100 MG: 100 CAPSULE, LIQUID FILLED ORAL at 09:55

## 2022-01-01 RX ADMIN — OXYCODONE HYDROCHLORIDE 2.5 MG: 5 TABLET ORAL at 09:55

## 2022-01-01 RX ADMIN — METOPROLOL SUCCINATE 25 MG: 25 TABLET, EXTENDED RELEASE ORAL at 21:22

## 2022-01-01 RX ADMIN — DEXTROSE MONOHYDRATE 25 ML: 25 INJECTION, SOLUTION INTRAVENOUS at 11:19

## 2022-01-01 RX ADMIN — MIDODRINE HYDROCHLORIDE 5 MG: 5 TABLET ORAL at 13:24

## 2022-01-01 RX ADMIN — MIDODRINE HYDROCHLORIDE 10 MG: 5 TABLET ORAL at 15:44

## 2022-01-01 RX ADMIN — FENTANYL CITRATE 50 MCG: 50 INJECTION INTRAMUSCULAR; INTRAVENOUS at 17:23

## 2022-01-01 RX ADMIN — NEPHROCAP 1 CAPSULE: 1 CAP ORAL at 18:07

## 2022-01-01 RX ADMIN — ALBUMIN (HUMAN) 12.5 G: 0.25 INJECTION, SOLUTION INTRAVENOUS at 15:58

## 2022-01-01 RX ADMIN — METRONIDAZOLE 500 MG: 500 INJECTION, SOLUTION INTRAVENOUS at 09:42

## 2022-01-01 RX ADMIN — ACETAMINOPHEN 650 MG: 325 TABLET ORAL at 15:53

## 2022-01-01 RX ADMIN — APIXABAN 2.5 MG: 2.5 TABLET, FILM COATED ORAL at 17:55

## 2022-01-01 RX ADMIN — MIDODRINE HYDROCHLORIDE 10 MG: 5 TABLET ORAL at 11:34

## 2022-01-01 RX ADMIN — CALCIUM GLUCONATE 2 G: 20 INJECTION, SOLUTION INTRAVENOUS at 11:27

## 2022-01-01 RX ADMIN — MIDAZOLAM 1 MG: 1 INJECTION INTRAMUSCULAR; INTRAVENOUS at 17:41

## 2022-01-01 RX ADMIN — METRONIDAZOLE 500 MG: 500 INJECTION, SOLUTION INTRAVENOUS at 08:18

## 2022-01-01 RX ADMIN — GABAPENTIN 100 MG: 100 CAPSULE ORAL at 22:55

## 2022-01-01 RX ADMIN — IOHEXOL 50 ML: 350 INJECTION, SOLUTION INTRAVENOUS at 18:35

## 2022-01-01 RX ADMIN — RIFAXIMIN 550 MG: 550 TABLET ORAL at 09:58

## 2022-01-01 RX ADMIN — METRONIDAZOLE 500 MG: 500 INJECTION, SOLUTION INTRAVENOUS at 17:48

## 2022-01-01 RX ADMIN — DOCUSATE SODIUM 100 MG: 100 CAPSULE, LIQUID FILLED ORAL at 17:52

## 2022-01-01 RX ADMIN — CEFTRIAXONE SODIUM 1000 MG: 10 INJECTION, POWDER, FOR SOLUTION INTRAVENOUS at 17:07

## 2022-01-01 RX ADMIN — MIDAZOLAM 1 MG: 1 INJECTION INTRAMUSCULAR; INTRAVENOUS at 17:22

## 2022-01-01 RX ADMIN — CEFTRIAXONE SODIUM 1000 MG: 10 INJECTION, POWDER, FOR SOLUTION INTRAVENOUS at 14:08

## 2022-01-01 RX ADMIN — Medication 3 MG: at 21:36

## 2022-01-01 RX ADMIN — MIDODRINE HYDROCHLORIDE 10 MG: 5 TABLET ORAL at 16:36

## 2022-01-01 RX ADMIN — ALBUMIN (HUMAN) 12.5 G: 12.5 INJECTION, SOLUTION INTRAVENOUS at 07:50

## 2022-01-01 RX ADMIN — MIDODRINE HYDROCHLORIDE 5 MG: 5 TABLET ORAL at 11:29

## 2022-01-01 RX ADMIN — OXYCODONE HYDROCHLORIDE 2.5 MG: 5 TABLET ORAL at 03:48

## 2022-01-01 RX ADMIN — MIDODRINE HYDROCHLORIDE 10 MG: 5 TABLET ORAL at 07:15

## 2022-01-01 RX ADMIN — METOPROLOL SUCCINATE 25 MG: 25 TABLET, EXTENDED RELEASE ORAL at 08:43

## 2022-01-01 RX ADMIN — ALBUMIN (HUMAN) 25 G: 0.25 INJECTION, SOLUTION INTRAVENOUS at 11:28

## 2022-01-01 RX ADMIN — THIAMINE HYDROCHLORIDE 100 MG: 100 INJECTION, SOLUTION INTRAMUSCULAR; INTRAVENOUS at 01:16

## 2022-01-01 RX ADMIN — PANTOPRAZOLE SODIUM 40 MG: 40 TABLET, DELAYED RELEASE ORAL at 06:01

## 2022-01-01 RX ADMIN — MIDODRINE HYDROCHLORIDE 10 MG: 5 TABLET ORAL at 09:02

## 2022-01-01 RX ADMIN — ALTEPLASE 4 MG: 2.2 INJECTION, POWDER, LYOPHILIZED, FOR SOLUTION INTRAVENOUS at 17:32

## 2022-01-01 RX ADMIN — APIXABAN 2.5 MG: 2.5 TABLET, FILM COATED ORAL at 20:10

## 2022-01-01 RX ADMIN — APIXABAN 2.5 MG: 2.5 TABLET, FILM COATED ORAL at 11:02

## 2022-01-01 RX ADMIN — APIXABAN 2.5 MG: 2.5 TABLET, FILM COATED ORAL at 17:31

## 2022-01-01 RX ADMIN — APIXABAN 2.5 MG: 2.5 TABLET, FILM COATED ORAL at 18:12

## 2022-01-01 RX ADMIN — TRIMETHOBENZAMIDE HYDROCHLORIDE 200 MG: 100 INJECTION INTRAMUSCULAR at 16:39

## 2022-01-01 RX ADMIN — RIFAXIMIN 550 MG: 550 TABLET ORAL at 21:53

## 2022-01-01 RX ADMIN — DOCUSATE SODIUM 100 MG: 100 CAPSULE, LIQUID FILLED ORAL at 09:22

## 2022-01-01 RX ADMIN — ALBUMIN (HUMAN) 25 G: 0.25 INJECTION, SOLUTION INTRAVENOUS at 20:21

## 2022-01-01 RX ADMIN — TRIMETHOBENZAMIDE HYDROCHLORIDE 200 MG: 100 INJECTION INTRAMUSCULAR at 16:04

## 2022-01-01 RX ADMIN — ALBUMIN (HUMAN) 12.5 G: 0.25 INJECTION, SOLUTION INTRAVENOUS at 13:46

## 2022-01-01 RX ADMIN — Medication 3 MG: at 21:46

## 2022-01-01 RX ADMIN — MIDODRINE HYDROCHLORIDE 5 MG: 5 TABLET ORAL at 05:48

## 2022-01-01 RX ADMIN — DOXERCALCIFEROL 3 MCG: 4 INJECTION, SOLUTION INTRAVENOUS at 13:56

## 2022-01-01 RX ADMIN — MIDODRINE HYDROCHLORIDE 5 MG: 5 TABLET ORAL at 15:52

## 2022-01-01 RX ADMIN — IRON SUCROSE 50 MG: 20 INJECTION, SOLUTION INTRAVENOUS at 11:13

## 2022-01-01 RX ADMIN — Medication 125 MG: at 09:51

## 2022-01-01 RX ADMIN — LACTULOSE 20 G: 20 SOLUTION ORAL at 00:03

## 2022-01-01 RX ADMIN — Medication 125 MG: at 21:40

## 2022-01-01 RX ADMIN — ATORVASTATIN CALCIUM 40 MG: 40 TABLET, FILM COATED ORAL at 09:58

## 2022-01-01 RX ADMIN — DOXERCALCIFEROL 3 MCG: 4 INJECTION, SOLUTION INTRAVENOUS at 09:09

## 2022-01-01 RX ADMIN — APIXABAN 2.5 MG: 2.5 TABLET, FILM COATED ORAL at 21:40

## 2022-01-01 RX ADMIN — APIXABAN 2.5 MG: 2.5 TABLET, FILM COATED ORAL at 09:58

## 2022-01-01 RX ADMIN — METOPROLOL TARTRATE 25 MG: 25 TABLET, FILM COATED ORAL at 16:23

## 2022-01-01 RX ADMIN — APIXABAN 2.5 MG: 2.5 TABLET, FILM COATED ORAL at 18:47

## 2022-01-01 RX ADMIN — APIXABAN 2.5 MG: 2.5 TABLET, FILM COATED ORAL at 17:51

## 2022-01-01 RX ADMIN — METRONIDAZOLE 500 MG: 500 INJECTION, SOLUTION INTRAVENOUS at 17:51

## 2022-01-01 RX ADMIN — CEFTRIAXONE SODIUM 1000 MG: 10 INJECTION, POWDER, FOR SOLUTION INTRAVENOUS at 13:56

## 2022-01-01 RX ADMIN — APIXABAN 2.5 MG: 2.5 TABLET, FILM COATED ORAL at 09:26

## 2022-01-01 RX ADMIN — GABAPENTIN 100 MG: 100 CAPSULE ORAL at 21:48

## 2022-01-01 RX ADMIN — RIFAXIMIN 550 MG: 550 TABLET ORAL at 20:10

## 2022-01-01 RX ADMIN — MIDODRINE HYDROCHLORIDE 5 MG: 5 TABLET ORAL at 10:50

## 2022-01-01 RX ADMIN — LACTULOSE 20 G: 10 SOLUTION ORAL at 09:22

## 2022-01-01 RX ADMIN — ATORVASTATIN CALCIUM 40 MG: 40 TABLET, FILM COATED ORAL at 11:02

## 2022-01-01 RX ADMIN — PANTOPRAZOLE SODIUM 40 MG: 40 TABLET, DELAYED RELEASE ORAL at 05:49

## 2022-01-01 RX ADMIN — LACTULOSE 20 G: 20 SOLUTION ORAL at 16:36

## 2022-01-01 RX ADMIN — CLOTRIMAZOLE: 1 CREAM TOPICAL at 11:03

## 2022-01-01 RX ADMIN — RIFAXIMIN 550 MG: 550 TABLET ORAL at 09:52

## 2022-01-01 RX ADMIN — ATORVASTATIN CALCIUM 40 MG: 40 TABLET, FILM COATED ORAL at 17:31

## 2022-01-01 RX ADMIN — MIDODRINE HYDROCHLORIDE 10 MG: 5 TABLET ORAL at 05:49

## 2022-01-01 RX ADMIN — DOXERCALCIFEROL 2.5 MCG: 4 INJECTION, SOLUTION INTRAVENOUS at 08:26

## 2022-01-01 RX ADMIN — RIFAXIMIN 550 MG: 550 TABLET ORAL at 08:01

## 2022-01-01 RX ADMIN — ATORVASTATIN CALCIUM 40 MG: 40 TABLET, FILM COATED ORAL at 08:41

## 2022-01-01 RX ADMIN — MIDODRINE HYDROCHLORIDE 5 MG: 5 TABLET ORAL at 11:38

## 2022-01-01 RX ADMIN — MIDODRINE HYDROCHLORIDE 5 MG: 5 TABLET ORAL at 09:21

## 2022-01-01 RX ADMIN — DOCUSATE SODIUM 100 MG: 100 CAPSULE, LIQUID FILLED ORAL at 17:56

## 2022-01-01 RX ADMIN — LACTULOSE 20 G: 20 SOLUTION ORAL at 08:00

## 2022-01-01 RX ADMIN — LACTULOSE 20 G: 10 SOLUTION ORAL at 09:54

## 2022-01-01 RX ADMIN — LACTULOSE 20 G: 20 SOLUTION ORAL at 15:46

## 2022-01-01 RX ADMIN — DOCUSATE SODIUM 100 MG: 100 CAPSULE, LIQUID FILLED ORAL at 11:02

## 2022-01-01 RX ADMIN — MIDODRINE HYDROCHLORIDE 10 MG: 5 TABLET ORAL at 05:38

## 2022-01-01 RX ADMIN — HEPARIN SODIUM 5000 UNITS: 1000 INJECTION INTRAVENOUS; SUBCUTANEOUS at 17:33

## 2022-01-01 RX ADMIN — MIDODRINE HYDROCHLORIDE 5 MG: 5 TABLET ORAL at 10:51

## 2022-01-01 RX ADMIN — CEFTRIAXONE SODIUM 1000 MG: 10 INJECTION, POWDER, FOR SOLUTION INTRAVENOUS at 16:52

## 2022-01-01 RX ADMIN — ATORVASTATIN CALCIUM 40 MG: 40 TABLET, FILM COATED ORAL at 08:00

## 2022-01-01 RX ADMIN — MIDODRINE HYDROCHLORIDE 5 MG: 5 TABLET ORAL at 06:44

## 2022-01-01 RX ADMIN — METOPROLOL SUCCINATE 25 MG: 25 TABLET, EXTENDED RELEASE ORAL at 09:26

## 2022-01-01 RX ADMIN — METRONIDAZOLE 500 MG: 500 INJECTION, SOLUTION INTRAVENOUS at 16:36

## 2022-01-01 RX ADMIN — LACTULOSE 20 G: 20 SOLUTION ORAL at 20:10

## 2022-01-01 RX ADMIN — MIDODRINE HYDROCHLORIDE 5 MG: 5 TABLET ORAL at 06:00

## 2022-01-01 RX ADMIN — NOREPINEPHRINE BITARTRATE 1 MCG/MIN: 1 INJECTION, SOLUTION, CONCENTRATE INTRAVENOUS at 14:40

## 2022-01-01 RX ADMIN — APIXABAN 2.5 MG: 2.5 TABLET, FILM COATED ORAL at 08:14

## 2022-01-01 RX ADMIN — EPOETIN ALFA 4000 UNITS: 4000 SOLUTION INTRAVENOUS; SUBCUTANEOUS at 08:26

## 2022-01-01 RX ADMIN — APIXABAN 2.5 MG: 2.5 TABLET, FILM COATED ORAL at 14:12

## 2022-01-01 RX ADMIN — CLOTRIMAZOLE: 1 CREAM TOPICAL at 18:05

## 2022-01-01 RX ADMIN — LACTULOSE 20 G: 20 SOLUTION ORAL at 21:53

## 2022-01-01 RX ADMIN — METRONIDAZOLE 500 MG: 500 INJECTION, SOLUTION INTRAVENOUS at 00:56

## 2022-01-01 RX ADMIN — MIDODRINE HYDROCHLORIDE 10 MG: 5 TABLET ORAL at 17:00

## 2022-01-01 RX ADMIN — NEPHROCAP 1 CAPSULE: 1 CAP ORAL at 17:31

## 2022-01-01 RX ADMIN — PANTOPRAZOLE SODIUM 40 MG: 40 TABLET, DELAYED RELEASE ORAL at 05:26

## 2022-01-01 RX ADMIN — Medication 3 MG: at 21:48

## 2022-01-01 RX ADMIN — GABAPENTIN 100 MG: 100 CAPSULE ORAL at 21:36

## 2022-01-01 RX ADMIN — MIDODRINE HYDROCHLORIDE 5 MG: 5 TABLET ORAL at 11:02

## 2022-01-01 RX ADMIN — CLOTRIMAZOLE: 1 CREAM TOPICAL at 17:55

## 2022-01-01 RX ADMIN — APIXABAN 2.5 MG: 2.5 TABLET, FILM COATED ORAL at 09:10

## 2022-01-01 RX ADMIN — TRIMETHOBENZAMIDE HYDROCHLORIDE 200 MG: 100 INJECTION INTRAMUSCULAR at 16:14

## 2022-01-01 RX ADMIN — MIDODRINE HYDROCHLORIDE 10 MG: 5 TABLET ORAL at 17:51

## 2022-01-01 RX ADMIN — APIXABAN 2.5 MG: 2.5 TABLET, FILM COATED ORAL at 09:22

## 2022-01-01 RX ADMIN — ATORVASTATIN CALCIUM 40 MG: 40 TABLET, FILM COATED ORAL at 09:22

## 2022-01-01 RX ADMIN — ATORVASTATIN CALCIUM 40 MG: 40 TABLET, FILM COATED ORAL at 09:55

## 2022-01-01 RX ADMIN — Medication 10 ML: at 17:25

## 2022-01-01 NOTE — ASSESSMENT & PLAN NOTE
Lab Results   Component Value Date    EGFR 8 12/21/2021    EGFR 6 12/20/2021    EGFR 7 12/19/2021    CREATININE 5 80 (H) 12/21/2021    CREATININE 7 33 (H) 12/20/2021    CREATININE 6 43 (H) 12/19/2021     Continue with hemodialysis Mondays, Wednesdays, Fridays

## 2022-01-01 NOTE — PROGRESS NOTES
FAMILY PRACTICE OFFICE VISIT       NAME: Oliver Sharpe  AGE: 80 y o  SEX: male       : 1940        MRN: [id number]    Assessment and Plan   1  Hospital discharge follow-up    2  Gastrointestinal hemorrhage, unspecified gastrointestinal hemorrhage type  Assessment & Plan:  Patient presented to the hospital on  with melena x2 weeks, fatigue, lightheadedness, shortness of breath with exertion  INR was 11 45  Patient treated with vitamin K, and transfused with a total of 3 units of packed red blood cells  EGD and colonoscopy did not show any overt source of bleeding  Recommend continued follow-up with Gastroenterology, and possible capsule endoscopy  Continue pantoprazole  Orders:  -     pantoprazole (PROTONIX) 40 mg tablet; Take 1 tablet (40 mg total) by mouth daily in the early morning    3  Anemia due to chronic kidney disease, unspecified CKD stage  Assessment & Plan:  Baseline hemoglobin 9-11  Hemoglobin dropped to 5 8 during hospitalization in the setting of GI bleed  Transfused 2 units of packed red blood cells on , and another unit of packed red blood cells on   On day of discharge hemoglobin 8 6  Will continue close monitoring  Hematology follow up scheduled 22  4  Hypertensive heart disease with heart failure [facility])  Assessment & Plan:  Wt Readings from Last 3 Encounters:   21 81 6 kg (180 lb)   21 79 2 kg (174 lb 9 7 oz)   10/14/21 80 5 kg (177 lb 6 4 oz)       Blood pressure is stable today 130/60  Appears euvolemic today  Continue Bumex 4 mg twice daily  Continue follow-up with Cardiology  5  ESRD (end stage renal disease) [facility])  Assessment & Plan:  Lab Results   Component Value Date    EGFR 8 2021    EGFR 6 2021    EGFR 7 2021    CREATININE 5 80 (H) 2021    CREATININE 7 33 (H) 2021    CREATININE 6 43 (H) 2021     Continue with hemodialysis , ,         6  Paroxysmal atrial fibrillation [facility])  Assessment & Plan:  Paroxysmal AFib  Regular rate and rhythm auscultated on exam today  Is continuing with Coumadin with close cardiology management of INRs  INR 1 71 on 12/28  Using metoprolol only on an as-needed basis for palpitations, due to hypotension  7  Intertrigo  Comments:  Renewed ketoconazole cream for as needed use for axillary intertrigo that comes and goes  Orders:  -     ketoconazole (NIZORAL) 2 % cream; Apply topically daily    Follow up in the office in 3 months for check up  Chief Complaint     Chief Complaint   Patient presents with    Transition of Care Management       History of Present Illness     Mike Siemens ROMULO Core is a 80year old male presenting today for hospital discharge follow up  Hospitalized 12/17 through 12/22 for GI bleed  Reviewed hospital discharge summary, blood work, EGD, and colonoscopy  Patient initially presented to the hospital on December 17th with 2 weeks of melena, fatigue, lightheadedness, shortness of breath with exertion  INR was 11 45 on admission  Positive Hemoccult stool  EGD and colonoscopy completed on December 20th, with no source of bleeding determined  Was given p o  vitamin K, and transfused a total of 3 units packed red blood cells  Hemoglobin increased to 8 6 on discharge  [place] [facility] He is feeling significantly better, with no shortness of breath, fatigue, dizziness  No further black or bloody stools            TCM Call (since 12/1/2021)     Date and time call was made  12/22/2021  4:52 PM    Hospital care reviewed  Records reviewed        Patient was hospitialized at  [facility]        Date of Admission  12/17/21    Date of discharge  12/22/21    Diagnosis  GI bleed, HTN, HLD (DNSA: 01/04/22)      Disposition  Home    Were the patients medications reviewed and updated  No    Current Symptoms  None      TCM Call (since 12/1/2021)     Post hospital issues  Reduced activity    Should patient be enrolled in anticoag monitoring? No    Scheduled for follow up? Yes    Did you obtain your prescribed medications  Yes    Do you need help managing your prescriptions or medications  No    Is transportation to your appointment needed  No    I have advised the patient to call PCP with any new or worsening symptoms  Sarah Cochran, [address] members    Support System  Family    Interperter language line needed  No    Counseling  Family    Counseling topics  Importance of RX compliance    Comments  Apt scheduled for 12/30/21 at 9am            Review of Systems   Review of Systems   Constitutional: Negative  HENT: Negative  Respiratory: Negative  Cardiovascular: Negative  Gastrointestinal: Negative  Genitourinary: Negative  Musculoskeletal: Positive for arthralgias (knee pain bilaterally, scheduled for orthopedics follow up)  Skin: Negative  Neurological: Negative  Hematological: Negative  Psychiatric/Behavioral: Negative  I have reviewed the patient's medical history in detail; there are no changes to the history as noted in the electronic medical record  Objective     Vitals:    12/30/21 0845   BP: 130/60   Pulse: 93   Resp: 16   Temp: 98 4 °F (36 9 °C)   TempSrc: Temporal   SpO2: 98%   Weight: 81 6 kg (180 lb)   Height: 5' 7" (1 702 m)     Wt Readings from Last 3 Encounters:   12/30/21 81 6 kg (180 lb)   12/21/21 79 2 kg (174 lb 9 7 oz)   10/14/21 80 5 kg (177 lb 6 4 oz)     Physical Exam  Vitals and nursing note reviewed  Constitutional:       General: He is not in acute distress  Appearance: Normal appearance  He is not ill-appearing  HENT:      Head: Normocephalic and atraumatic  Right Ear: Tympanic membrane and ear canal normal       Left Ear: Tympanic membrane and ear canal normal       Mouth/Throat:      Mouth: Mucous membranes are moist       Pharynx: Oropharynx is clear     Eyes:      Conjunctiva/sclera: Conjunctivae normal    Cardiovascular: Rate and Rhythm: Normal rate and regular rhythm  Heart sounds: Murmur heard  Pulmonary:      Effort: Pulmonary effort is normal  No respiratory distress  Breath sounds: Normal breath sounds  No wheezing or rales  Abdominal:      General: Abdomen is flat  Palpations: Abdomen is soft  Tenderness: There is no abdominal tenderness  Musculoskeletal:      Cervical back: Normal range of motion and neck supple  Right lower leg: No edema  Left lower leg: No edema  Lymphadenopathy:      Cervical: No cervical adenopathy  Skin:     General: Skin is warm and dry  Findings: No rash  Neurological:      General: No focal deficit present  Mental Status: He is alert     Psychiatric:         Mood and Affect: Mood normal             ALLERGIES:  No Known Allergies    Current Medications     Current Outpatient Medications   Medication Sig Dispense Refill    atorvastatin (LIPITOR) 40 mg tablet TAKE 1 TABLET (40 MG TOTAL) BY MOUTH DAILY 90 tablet 0    b complex-vitamin C-folic acid (NEPHROCAPS) 1 mg capsule Take 1 capsule by mouth daily with dinner 30 capsule 0    bumetanide (BUMEX) 2 mg tablet Take 2 tablets (4 mg total) by mouth 2 (two) times a day 120 tablet 5    diphenhydrAMINE (BENADRYL) 25 mg tablet Take 25 mg by mouth every 6 (six) hours as needed for itching       MG capsule TAKE 1 CAPSULE (100 MG TOTAL) BY MOUTH 2 (TWO) TIMES A DAY 30 capsule 3    gabapentin (NEURONTIN) 100 mg capsule TAKE 1 CAPSULE BY MOUTH DAILY IN THE EVENING      ketoconazole (NIZORAL) 2 % cream Apply topically daily 30 g 2    lidocaine (LIDODERM) 5 % Apply 1 patch topically daily Remove & Discard patch within 12 hours or as directed by MD 30 patch 0    lidocaine-prilocaine (EMLA) cream APPLY TOPICALLY AS NEEDED FOR MILD PAIN (WITH HD SESSIONS) WITH DIALYSIS SESSIONS 30 g 3    melatonin 3 mg Take 1 tablet (3 mg total) by mouth daily at bedtime 30 tablet 0    metoprolol succinate (TOPROL-XL) 25 mg 24 hr tablet 1/2 tab at bedtime when needed for palpitations,  Do not take the evening prior to HD 30 tablet 3    pantoprazole (PROTONIX) 40 mg tablet Take 1 tablet (40 mg total) by mouth daily in the early morning 30 tablet 2    warfarin (COUMADIN) 1 mg tablet Take 1 tablet (1 mg total) by mouth daily 30 tablet 0     No current facility-administered medications for this visit           Health Maintenance     Health Maintenance   Topic Date Due    Fall Risk  01/07/2022   Hanna Paris Medicare Annual Wellness Visit (AWV)  01/07/2022    BMI: Followup Plan  01/17/2022    Depression Screening  07/27/2022    BMI: Adult  12/30/2022    DTaP,Tdap,and Td Vaccines (2 - Td or Tdap) 05/01/2029    Hepatitis C Screening  Completed    Pneumococcal Vaccine: 65+ Years  Completed    Influenza Vaccine  Completed    COVID-19 Vaccine  Completed    HIB Vaccine  Aged Out    Hepatitis B Vaccine  Aged Out    IPV Vaccine  Aged Out    Hepatitis A Vaccine  Aged Out    Meningococcal ACWY Vaccine  Aged Out    HPV Vaccine  Aged Out     Immunization History   Administered Date(s) Administered    COVID-19 PFIZER VACCINE 0 3 ML IM 03/31/2021, 04/21/2021, 12/10/2021    INFLUENZA 10/16/2014, 09/02/2015, 08/15/2016, 09/15/2018    Influenza Quadrivalent Preservative Free 3 years and older IM 10/16/2014    Influenza Split High Dose Preservative Free IM 01/20/2015    Influenza, high dose seasonal 0 7 mL 10/28/2020, 10/27/2021    Pneumococcal Conjugate 13-Valent 04/17/2019    Pneumococcal Polysaccharide PPV23 01/07/2021    Tdap 05/01/2019    Tuberculin Skin Test-PPD Intradermal 01/09/2020, 02/22/2021    Zoster 01/20/2015    Zoster Vaccine Recombinant 09/15/2018, 04/17/2019    influenza, trivalent, adjuvanted 08/28/2019       EL Ruiz

## 2022-01-01 NOTE — ASSESSMENT & PLAN NOTE
Baseline hemoglobin 9-11  Hemoglobin dropped to 5 8 during hospitalization in the setting of GI bleed  Transfused 2 units of packed red blood cells on 12/17, and another unit of packed red blood cells on 12/21  On day of discharge hemoglobin 8 6  Will continue close monitoring  Hematology follow up scheduled 1/13/22

## 2022-01-01 NOTE — ASSESSMENT & PLAN NOTE
Patient presented to the hospital on 12/17 with melena x2 weeks, fatigue, lightheadedness, shortness of breath with exertion  INR was 11 45  Patient treated with vitamin K, and transfused with a total of 3 units of packed red blood cells  EGD and colonoscopy did not show any overt source of bleeding  Recommend continued follow-up with Gastroenterology, and possible capsule endoscopy  Continue pantoprazole

## 2022-01-01 NOTE — ASSESSMENT & PLAN NOTE
Paroxysmal AFib  Regular rate and rhythm auscultated on exam today  Is continuing with Coumadin with close cardiology management of INRs  INR 1 71 on 12/28  Using metoprolol only on an as-needed basis for palpitations, due to hypotension

## 2022-01-01 NOTE — ASSESSMENT & PLAN NOTE
Wt Readings from Last 3 Encounters:   12/30/21 81 6 kg (180 lb)   12/21/21 79 2 kg (174 lb 9 7 oz)   10/14/21 80 5 kg (177 lb 6 4 oz)       Blood pressure is stable today 130/60  Appears euvolemic today  Continue Bumex 4 mg twice daily  Continue follow-up with Cardiology

## 2022-01-07 NOTE — H&P
Interventional Radiology  History and Physical 1/7/2022     Tip Cabrera Core   1940   [id number]    Assessment/Plan:  61-year-old male with ESRD dialyzing through a left forearm loop graft returns due to thrombosed graft  Problem List Items Addressed This Visit     None      Visit Diagnoses     Dialysis AV fistula malfunction, subsequent encounter        Relevant Orders    IR AV fistula/graft declot             Subjective:     Patient ID: Aneta Young is a 80 y o  male  History of Present Illness  Patient with ESRD dialyzing through a left forearm loop graft returns due to thrombosed graft      Review of Systems      Past Medical History:   Diagnosis Date    Acute renal failure superimposed on stage 4 chronic kidney disease ([facility] [address] ) 10/4/2020    Arthritis     BPH without urinary obstruction     CKD (chronic kidney disease), stage III (HCC)     baseline 1 6-1 7    Dialysis patient ([place] [address] )     GERD (gastroesophageal reflux disease)     Hyperlipidemia     Hypertension     MI (myocardial infarction) ([place] [address] )         Past Surgical History:   Procedure Laterality Date    APPENDECTOMY      CARDIAC SURGERY      COLONOSCOPY  2017    FRACTURE SURGERY      IR AV FISTULA/GRAFT DECLOT  9/3/2021    IR AV FISTULA/GRAFT DECLOT  9/30/2021    IR PERITONEAL DIALYSIS CATHETER PLACEMENT  2/3/2021    IR PERITONEAL DIALYSIS CATHETER REMOVAL  2/17/2021    IR PERITONEAL DIALYSIS CATHETER REMOVAL AND PLACEMENT NEW SITE  2/9/2021    IR TEMPORARY DIALYSIS CATHETER PLACEMENT  12/23/2019    IR THORACENTESIS  12/24/2019    IR THORACENTESIS  12/27/2019    IR TUNNELED CENTRAL LINE REMOVAL  4/27/2021    IR TUNNELED DIALYSIS CATHETER CHECK/CHANGE/REPOSITION/ANGIOPLASTY  1/6/2020    IR TUNNELED DIALYSIS CATHETER PLACEMENT  1/2/2020    IR TUNNELED DIALYSIS CATHETER PLACEMENT  2/15/2021    IR TUNNELED DIALYSIS CATHETER REMOVAL  3/4/2020    SD ANASTOMOSIS,AV,ANY SITE Left 3/26/2021    Procedure: LEFT FOREARM LOOP AV GRAFT; Surgeon: Dara Bautista MD;  [facility];  Service: Vascular    MN ASCEND AORTA GRAFT W ROOT REPLACMENT  VALVE CONDUIT/CORON RECONSTRUCT N/A 12/15/2019    Procedure: BENTALL PROCEDURE (ASCENDING AORTIC REPAIR) with 26mm Gelweave Graft;  Surgeon: Marietta Albert DO;  [facility];  Service: Cardiac Surgery    MN RECONSTR TOTAL SHOULDER IMPLANT Right 12/5/2017    Procedure: ARTHROPLASTY SHOULDER REVERSE with OPEN CYST EXCISION;  Surgeon: Emmanuel Alvarado MD;  [facility];  Service: Orthopedics    SHOULDER SURGERY      WRIST SURGERY          Social History     Tobacco Use   Smoking Status Never Smoker   Smokeless Tobacco Never Used        Social History     Substance and Sexual Activity   Alcohol Use Not Currently        Social History     Substance and Sexual Activity   Drug Use Not Currently        No Known Allergies    No current facility-administered medications for this encounter  Objective:    Vitals:    01/07/22 1443   BP: 107/63   BP Location: Right arm   Pulse: 75   Resp: 18   Temp: 98 °F (36 7 °C)   TempSrc: Oral   SpO2: 98%   Weight: 78 9 kg (174 lb)   Height: 5' 8" (1 727 m)        Physical Exam  Constitutional:       Appearance: Normal appearance  Cardiovascular:      Rate and Rhythm: Normal rate  Pulmonary:      Effort: Pulmonary effort is normal    Musculoskeletal:      Comments: Left forearm loop AV graft in place without palpable thrill             No results found for: BNP   Lab Results   Component Value Date    WBC 7 55 12/28/2021    HGB 9 4 (L) 12/28/2021    HCT 30 3 (L) 12/28/2021     (H) 12/28/2021     (L) 12/28/2021     Lab Results   Component Value Date    INR 1 71 (H) 12/28/2021    INR 1 53 (H) 12/22/2021    INR 1 77 (H) 12/21/2021    PROTIME 19 3 (H) 12/28/2021    PROTIME 18 3 (H) 12/22/2021    PROTIME 20 4 (H) 12/21/2021     Lab Results   Component Value Date    PTT 62 (H) 06/09/2021         I have personally reviewed pertinent imaging and laboratory results  Code Status: Prior  Advance Directive and Living Will:      Power of :    POLST:      This text is generated with voice recognition software  There may be translation, syntax,  or grammatical errors  If you have any questions, please contact the dictating provider

## 2022-01-07 NOTE — NURSING NOTE
Pt on floor  Dressing clean, dry, and intact  Per Elder Meyers in IR, woggle on pt  To be removed prior to discharge  Pt made aware that he is not permitted to drive himself home  Will continue to monitor

## 2022-01-07 NOTE — BRIEF OP NOTE (RAD/CATH)
INTERVENTIONAL RADIOLOGY PROCEDURE NOTE    Date: 1/7/2022    Procedure: IR AV FISTULA/GRAFT DECLOT    Preoperative diagnosis:   1  Dialysis AV fistula malfunction, subsequent encounter         Postoperative diagnosis: Same  Surgeon: Jeannine Gleason MD     Assistant: None  No qualified resident was available  Blood loss: 10 mL    Specimens: None     Findings:   1  Successful declot of thrombosed LUE forearm loop AV graft  2  Central veins were patent  3  Mild stenosis at the arterial anastomosis treated using 6 mm balloon angioplasty  Complications: None immediate      Anesthesia: conscious sedation and local

## 2022-01-07 NOTE — SEDATION DOCUMENTATION
Graft declot performed by Dr Ramires Lies without complications  Woggle placed and to be removed by IR staff prior to d/c  Procedure tolerated well by patient, VSS  IR Procedure Bedrest Start Time is 1750 x 1hour

## 2022-01-07 NOTE — DISCHARGE INSTRUCTIONS
Fistulagram   WHAT YOU NEED TO KNOW:   Your arm or leg my  be sore, swollen, and bruised after the procedure  This is normal and should get better in a few days  DISCHARGE INSTRUCTIONS:     Contact Interventional Radiology at [phone] and follow prompts if you experience any:    · You have a fever or chills  · Your puncture site is red, swollen, or draining pus  · You have nausea or are vomiting  · Your skin is itchy, swollen, or you have a rash  · You cannot feel a thrill over your graft or fistula  · You have questions or concerns about your condition or care  Seek care immediately if:     · You have bleeding that does not stop after 10 minutes of holding firm, direct pressure over the puncture site  · Blood soaks through your bandage  · Your hand or foot closest to the graft or fistula feels cold, painful, or numb  · Your hand or foot closest to the graft or fistula is pale or blue  · You have trouble moving your arm or leg closest to the graft or fistula  · Your bruise suddenly gets bigger  Care for your wound as directed:  Remove the bandage in 4 to 6 hours or as directed  Wash the area once a day with soap and water  Gently pat the area dry  Apply firm, steady pressure to the puncture site if it bleeds  Use a clean gauze or towel to hold pressure for 10 to 15 minutes  Call 911 if you cannot stop the bleeding or the bleeding gets heavier  Feel for a thrill once a day or as directed  Place your index and second finger over your fistula or graft as directed  You should feel a vibration  The vibration means that blood is flowing through your graft or fistula correctly  Rest your arm or leg as directed  Do not lift anything heavier than 5 pounds or do strenuous activity for 24 hours  Prevent damage to your graft or fistula  Do not wear tight-fitting clothing over your graft or fistula   Do not wear tight jewelry on the arm or leg with the graft or fistula  Tell healthcare providers not to do, IVs, blood draws, and blood pressure readings in the arm with your graft or fistula  Do not allow flu shots or vaccinations in your arm with your graft or fistula      Follow up with your healthcare provider as directed

## 2022-01-08 NOTE — NURSING NOTE
1845 IR tech attempted to remove woggle  Area beneath noted by tech to be bleeding  [place] IR tech returns  Area noted to continue bleeding  1945 Dr Benson Camera removes woggle and places dstat on site  Per Dr Benson Camera, pt to leave dressing on until dialysis on Monday 1950 Pt ambulated to bathroom w/out incident

## 2022-01-13 NOTE — TELEPHONE ENCOUNTER
Appointment Cancellation Or Reschedule     Person calling in [facility] Visit Date and Time 01/13/2022 @11 am   Office Visit Location [place]   Did patient want to reschedule their office appointment? If so, when was it scheduled to? Yes 2/1/2022 @ 11am   Is this patient calling to reschedule an infusion appointment? no   When is their next infusion appointment? n/a   Is this patient a Chemo patient? no   Reason for Cancellation or Reschedule Unable to keep appt     If the patient is a treatment patient, please route this to the office nurse  If the patient is not on treatment, please route to the office MA

## 2022-01-25 NOTE — TELEPHONE ENCOUNTER
Was able to speak with patient  He confirmed his taking losartan with HCTZ 100/25 mg 1 tablet daily as well as metoprolol titrate 50 mg 1 tablet twice a day  Will add hydralazine 25 mg 1 tablet t i d  (Prescription sent to his pharmacy) and I would like to bring him back to the office in 7-10 days for blood pressure check, please arrange a blood pressure check with Gracie Alfonso  Plan: - take a Zyrtec qam, Benadryl qhs\\n- Apply triamcinalone twice daily to rash up to 2 weeks/month as needed. Render In Strict Bullet Format?: No Detail Level: Zone Initiate Treatment: - Zyrtec and Benadryl\\n- triamcinalone topical

## 2022-02-03 NOTE — PROGRESS NOTES
[facility]  coding opportunities       Chart reviewed, no opportunity found: CHART REVIEWED, NO OPPORTUNITY FOUND                        Patients insurance company: [employer]

## 2022-02-08 NOTE — PROGRESS NOTES
FAMILY PRACTICE OFFICE VISIT       NAME: Oliver Sharpe  AGE: 80 y o  SEX: male       : 1940        MRN: [id number]    Assessment and Plan   1  Rheumatoid arthritis, involving unspecified site, unspecified whether rheumatoid factor present [facility])  Assessment & Plan:  [place] neck deformities in hands  Denies pain  Declines rheumatology follow up  2  Dissection of thoracic aorta [facility])  Assessment & Plan:  Repair in   Continue cardiology follow up  3  Hypertensive heart disease with heart failure [facility])  Assessment & Plan:  Wt Readings from Last 3 Encounters:   22 81 1 kg (178 lb 14 4 oz)   22 81 6 kg (180 lb)   22 78 9 kg (174 lb)       BP is stable  Volume managed by hemodialysis  Continue cardiology follow up  4  Paroxysmal atrial fibrillation (HCC)  Assessment & Plan:  Regular rate and rhythm today  Cardiology follow up scheduled tomorrow  5  Intertrigo  -     nystatin (MYCOSTATIN) powder; Apply topically 3 (three) times a day as needed (rash)    6  ESRD (end stage renal disease) [facility])  Assessment & Plan:  Lab Results   Component Value Date    EGFR 8 2021    EGFR 6 2021    EGFR 7 2021    CREATININE 5 80 (H) 2021    CREATININE 7 33 (H) 2021    CREATININE 6 43 (H) 2021     Hemodialysis Monday, Wednesday, Friday  7  Mixed hyperlipidemia  Assessment & Plan:  LDL 48 in September  Continue atorvastatin  8  Rotator cuff arthropathy, left  Assessment & Plan:  Following with Orthopedics, Dr Nancy Solis  He did have an injection in October, which was not effective  He declines physical therapy  If surgery should be needed, he is not sure he would want to proceed with this, also would likely be a poor surgical candidate  Recommend continued orthopedics follow-up        9  Medicare annual wellness visit, subsequent     Chief Complaint     Chief Complaint   Patient presents with   [facility] Wellness Visit    Shoulder Pain left shoulder pain 9 + mos       History of Present Illness     Oliver Sharpe is a 80year old male presenting today for left shoulder pain  Following with Dr Nnacy Santos  Had injection in October  No pain relief after this  Can't lift arm  No pain at rest    Pain only with movement  Declines PT due to dialysis and caring for his wife  Had surgery on right shoulder few years ago, still has pain  Not sure if he would pursue surgery at this point if needed due to hemodialysis schedule--therapy would be too much  Caregiver brought him today  Breathing feeling good  No edema    Cardiology appointment tomorrow  Review of Systems   Review of Systems   Constitutional: Negative  HENT: Negative  Respiratory: Negative  Cardiovascular: Negative  Gastrointestinal: Negative  Genitourinary: Negative  Musculoskeletal: Positive for arthralgias (left shoulder)  Skin: Negative  Neurological: Negative  Hematological: Negative  Psychiatric/Behavioral: Negative  I have reviewed the patient's medical history in detail; there are no changes to the history as noted in the electronic medical record  Objective     Vitals:    02/08/22 1415   BP: 110/70   Pulse: 91   Resp: 16   Temp: 98 °F (36 7 °C)   TempSrc: Temporal   SpO2: 99%   Weight: 81 6 kg (180 lb)   Height: 5' 8" (1 727 m)     Wt Readings from Last 3 Encounters:   02/09/22 81 1 kg (178 lb 14 4 oz)   02/08/22 81 6 kg (180 lb)   01/07/22 78 9 kg (174 lb)     Physical Exam  Vitals and nursing note reviewed  Constitutional:       General: He is not in acute distress  Appearance: Normal appearance  He is not ill-appearing  HENT:      Head: Normocephalic and atraumatic  Neck:      Thyroid: No thyromegaly  Vascular: No carotid bruit  Cardiovascular:      Rate and Rhythm: Normal rate and regular rhythm  Heart sounds: Murmur heard         Pulmonary:      Effort: Pulmonary effort is normal  No respiratory distress  Breath sounds: Normal breath sounds  No wheezing or rales  Abdominal:      General: Bowel sounds are normal       Palpations: Abdomen is soft  Tenderness: There is no abdominal tenderness  Musculoskeletal:      Cervical back: Normal range of motion and neck supple  Right lower leg: No edema  Left lower leg: No edema  Lymphadenopathy:      Cervical: No cervical adenopathy  Skin:     General: Skin is warm and dry  Comments: Mild lower extremity peripheral vascular discoloration  Neurological:      Mental Status: He is alert  Psychiatric:         Mood and Affect: Mood normal        BMI Counseling: Body mass index is 27 37 kg/m²  The BMI is above normal  Nutrition recommendations include encouraging healthy choices of fruits and vegetables  Exercise recommendations include exercising 3-5 times per week  Rationale for BMI follow-up plan is due to patient being overweight or obese  Weight is acceptable for chronic conditions  Depression Screening and Follow-up Plan: Patient was screened for depression during today's encounter  They screened negative with a PHQ-2 score of 0          ALLERGIES:  No Known Allergies    Current Medications     Current Outpatient Medications   Medication Sig Dispense Refill    atorvastatin (LIPITOR) 40 mg tablet TAKE 1 TABLET (40 MG TOTAL) BY MOUTH DAILY 90 tablet 0    b complex-vitamin C-folic acid (NEPHROCAPS) 1 mg capsule Take 1 capsule by mouth daily with dinner 30 capsule 0    bumetanide (BUMEX) 2 mg tablet Take 2 tablets (4 mg total) by mouth 2 (two) times a day 120 tablet 5    diphenhydrAMINE (BENADRYL) 25 mg tablet Take 25 mg by mouth every 6 (six) hours as needed for itching       MG capsule TAKE 1 CAPSULE (100 MG TOTAL) BY MOUTH 2 (TWO) TIMES A DAY 30 capsule 3    gabapentin (NEURONTIN) 100 mg capsule TAKE 1 CAPSULE BY MOUTH DAILY IN THE EVENING      ketoconazole (NIZORAL) 2 % cream Apply topically daily 30 g 2    lidocaine (LIDODERM) 5 % Apply 1 patch topically daily Remove & Discard patch within 12 hours or as directed by MD 30 patch 0    lidocaine-prilocaine (EMLA) cream APPLY TOPICALLY AS NEEDED FOR MILD PAIN (WITH HD SESSIONS) WITH DIALYSIS SESSIONS 30 g 3    melatonin 3 mg Take 1 tablet (3 mg total) by mouth daily at bedtime 30 tablet 0    metoprolol succinate (TOPROL-XL) 25 mg 24 hr tablet 1/2 tab at bedtime when needed for palpitations,  Do not take the evening prior to HD 30 tablet 3    pantoprazole (PROTONIX) 40 mg tablet Take 1 tablet (40 mg total) by mouth daily in the early morning 30 tablet 2    apixaban (Eliquis) 2 5 mg Take 1 tablet (2 5 mg total) by mouth 2 (two) times a day 180 tablet 3    nystatin (MYCOSTATIN) powder Apply topically 3 (three) times a day as needed (rash) 45 g 1     No current facility-administered medications for this visit           Health Maintenance     Health Maintenance   Topic Date Due    Medicare Annual Wellness Visit (AWV)  01/07/2022    Fall Risk  02/08/2023    Depression Screening  02/08/2023    BMI: Followup Plan  02/08/2023    BMI: Adult  02/09/2023    DTaP,Tdap,and Td Vaccines (2 - Td or Tdap) 05/01/2029    Hepatitis C Screening  Completed    Pneumococcal Vaccine: 65+ Years  Completed    Influenza Vaccine  Completed    COVID-19 Vaccine  Completed    HIB Vaccine  Aged Out    Hepatitis B Vaccine  Aged Out    IPV Vaccine  Aged Out    Hepatitis A Vaccine  Aged Out    Meningococcal ACWY Vaccine  Aged Out    HPV Vaccine  Aged Out     Immunization History   Administered Date(s) Administered    COVID-19 PFIZER VACCINE 0 3 ML IM 03/31/2021, 04/21/2021, 12/10/2021    INFLUENZA 10/16/2014, 09/02/2015, 08/15/2016, 09/15/2018    Influenza Quadrivalent Preservative Free 3 years and older IM 10/16/2014    Influenza Split High Dose Preservative Free IM 01/20/2015    Influenza, high dose seasonal 0 7 mL 10/28/2020, 10/27/2021    Pneumococcal Conjugate 13-Valent 04/17/2019    Pneumococcal Polysaccharide PPV23 01/07/2021    Tdap 05/01/2019    Tuberculin Skin Test-PPD Intradermal 01/09/2020, 02/22/2021    Unknown 01/19/2022    Zoster 01/20/2015    Zoster Vaccine Recombinant 09/15/2018, 04/17/2019    influenza, trivalent, adjuvanted 08/28/2019       NESSA McgovernNP

## 2022-02-08 NOTE — PATIENT INSTRUCTIONS

## 2022-02-08 NOTE — PROGRESS NOTES
Assessment and Plan:     Problem List Items Addressed This Visit        Cardiovascular and Mediastinum    Dissection of thoracic aorta ([facility] )     Repair in 2019  Continue cardiology follow up  Paroxysmal atrial fibrillation (HCC)     Regular rate and rhythm today  Cardiology follow up scheduled tomorrow  Hypertensive heart disease with heart failure (HCC)     Wt Readings from Last 3 Encounters:   02/09/22 81 1 kg (178 lb 14 4 oz)   02/08/22 81 6 kg (180 lb)   01/07/22 78 9 kg (174 lb)       BP is stable  Volume managed by hemodialysis  Continue cardiology follow up  Musculoskeletal and Integument    Rheumatoid arthritis (HCC) - Primary     [place] neck deformities in hands  Denies pain  Declines rheumatology follow up  Rotator cuff arthropathy, left     Following with Orthopedics, Dr Aurea Cai  He did have an injection in October, which was not effective  He declines physical therapy  If surgery should be needed, he is not sure he would want to proceed with this, also would likely be a poor surgical candidate  Recommend continued orthopedics follow-up  Genitourinary    ESRD (end stage renal disease) ([facility] )     Lab Results   Component Value Date    EGFR 8 12/21/2021    EGFR 6 12/20/2021    EGFR 7 12/19/2021    CREATININE 5 80 (H) 12/21/2021    CREATININE 7 33 (H) 12/20/2021    CREATININE 6 43 (H) 12/19/2021     Hemodialysis Monday, Wednesday, Friday  Other    Hyperlipidemia     LDL 48 in September  Continue atorvastatin  Other Visit Diagnoses     Intertrigo        Relevant Medications    nystatin (MYCOSTATIN) powder    Medicare annual wellness visit, subsequent              Depression Screening and Follow-up Plan: Patient was screened for depression during today's encounter  They screened negative with a PHQ-2 score of 0        Preventive health issues were discussed with patient, and age appropriate screening tests were ordered as noted in patient's After Visit Summary  Personalized health advice and appropriate referrals for health education or preventive services given if needed, as noted in patient's After Visit Summary  History of Present Illness:     Patient presents for Medicare Annual Wellness visit    Patient Care Team:  [id number] Jason Gardiner as PCP - General (Family Medicine)  Justin Casillas MD as PCP - [address] [place] [address] (RTE)  MD Mahsa Santos (Family Medicine)  Justin Casillas MD     Problem List:     Patient Active Problem List   Diagnosis    Rotator cuff tear arthropathy, right    Secondary osteoarthritis of right shoulder    Benign essential hypertension    Overweight (BMI 25 0-29  9)    Lumbar pain    Benign prostatic hyperplasia without lower urinary tract symptoms    Bradycardia    [facility] aorta ([facility])    S/P aorta repair    Anticoagulation goal of INR 2 to 3    Hyperphosphatemia    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    Complication of vascular dialysis catheter    Hyperlipidemia    Sleep disorder    Encounter for post surgical wound check    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    Monoclonal gammopathy    Paroxysmal atrial fibrillation (HCC)    GONZALES (dyspnea on exertion)    Acute on chronic diastolic (congestive) heart failure (HCC)    Chronic kidney disease-mineral and bone disorder    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 ([address] )    Rheumatoid arthritis ([address] )    Insomnia    ESRD (end stage renal disease) ([address] )    Status post placement of arteriovenous graft    Vision loss    Candidal intertrigo    Chronic diastolic CHF (congestive heart failure) ([facility])    GI bleed    Anemia    Coagulopathy (HCC)    Hypertensive heart disease with heart failure (HCC)    Rotator cuff arthropathy, left      Past Medical and Surgical History:     Past Medical History:   Diagnosis Date    Acute renal failure superimposed on stage 4 chronic kidney disease ([facility] ) 10/4/2020    Arthritis     BPH without urinary obstruction     CKD (chronic kidney disease), stage III (HCC)     baseline 1 6-1 7    Dialysis patient ([place] [address] )     GERD (gastroesophageal reflux disease)     Hyperlipidemia     Hypertension     MI (myocardial infarction) ([place] [address] )      Past Surgical History:   Procedure Laterality Date    APPENDECTOMY      CARDIAC SURGERY      COLONOSCOPY  2017    FRACTURE SURGERY      IR AV FISTULA/GRAFT DECLOT  9/3/2021    IR AV FISTULA/GRAFT DECLOT  9/30/2021    IR AV FISTULA/GRAFT DECLOT  1/7/2022    IR PERITONEAL DIALYSIS CATHETER PLACEMENT  2/3/2021    IR PERITONEAL DIALYSIS CATHETER REMOVAL  2/17/2021    IR PERITONEAL DIALYSIS CATHETER REMOVAL AND PLACEMENT NEW SITE  2/9/2021    IR TEMPORARY DIALYSIS CATHETER PLACEMENT  12/23/2019    IR THORACENTESIS  12/24/2019    IR THORACENTESIS  12/27/2019    IR TUNNELED CENTRAL LINE REMOVAL  4/27/2021    IR TUNNELED DIALYSIS CATHETER CHECK/CHANGE/REPOSITION/ANGIOPLASTY  1/6/2020    IR TUNNELED DIALYSIS CATHETER PLACEMENT  1/2/2020    IR TUNNELED DIALYSIS CATHETER PLACEMENT  2/15/2021    IR TUNNELED DIALYSIS CATHETER REMOVAL  3/4/2020    RI ANASTOMOSIS,AV,ANY SITE Left 3/26/2021    Procedure: LEFT FOREARM LOOP AV GRAFT;  Surgeon: Cammie Martin MD;  [facility]: [facility];  Service: Vascular    RI ASCEND AORTA GRAFT W ROOT REPLACMENT  VALVE CONDUIT/CORON RECONSTRUCT N/A 12/15/2019    Procedure: BENTALL PROCEDURE (ASCENDING AORTIC REPAIR) with 26mm Gelweave Graft;  Surgeon: Adam Chavez DO;  [facility];  Service: Cardiac Surgery    RI RECONSTR TOTAL SHOULDER IMPLANT Right 12/5/2017    Procedure: ARTHROPLASTY SHOULDER REVERSE with OPEN CYST EXCISION;  Surgeon: Royce Cortez MD;  [facility];  Service: Orthopedics    SHOULDER SURGERY      WRIST SURGERY        Family History:     Family History   Problem Relation Age of Onset    No Known Problems Mother     Hypertension Father  Arthritis Family     No Known Problems Daughter       Social History:     Social History     Socioeconomic History    Marital status: /Civil Union     Spouse name: None    Number of children: None    Years of education: None    Highest education level: None   Occupational History    None   Tobacco Use    Smoking status: Never Smoker    Smokeless tobacco: Never Used   Vaping Use    Vaping Use: Never used   Substance and Sexual Activity    Alcohol use: Not Currently    Drug use: Not Currently    Sexual activity: Not Currently     Partners: Female   Other Topics Concern    None   Social History Narrative    ** Merged History Encounter **         Daily caffeine consumption, 1 serving a day  Drinks coffee      Social Determinants of Health     Financial Resource Strain: Low Risk     Difficulty of Paying Living Expenses: Not hard at all   Food Insecurity: No Food Insecurity    Worried About Running Out of Food in the Last Year: Never true    Aravind of Food in the Last Year: Never true   Transportation Needs: No Transportation Needs    Lack of Transportation (Medical): No    Lack of Transportation (Non-Medical):  No   Physical Activity: Not on file   Stress: Not on file   Social Connections: Not on file   Intimate Partner Violence: Not on file   Housing Stability: Not on file      Medications and Allergies:     Current Outpatient Medications   Medication Sig Dispense Refill    atorvastatin (LIPITOR) 40 mg tablet TAKE 1 TABLET (40 MG TOTAL) BY MOUTH DAILY 90 tablet 0    b complex-vitamin C-folic acid (NEPHROCAPS) 1 mg capsule Take 1 capsule by mouth daily with dinner 30 capsule 0    bumetanide (BUMEX) 2 mg tablet Take 2 tablets (4 mg total) by mouth 2 (two) times a day 120 tablet 5    diphenhydrAMINE (BENADRYL) 25 mg tablet Take 25 mg by mouth every 6 (six) hours as needed for itching       MG capsule TAKE 1 CAPSULE (100 MG TOTAL) BY MOUTH 2 (TWO) TIMES A DAY 30 capsule 3    gabapentin (NEURONTIN) 100 mg capsule TAKE 1 CAPSULE BY MOUTH DAILY IN THE EVENING      ketoconazole (NIZORAL) 2 % cream Apply topically daily 30 g 2    lidocaine (LIDODERM) 5 % Apply 1 patch topically daily Remove & Discard patch within 12 hours or as directed by MD 30 patch 0    lidocaine-prilocaine (EMLA) cream APPLY TOPICALLY AS NEEDED FOR MILD PAIN (WITH HD SESSIONS) WITH DIALYSIS SESSIONS 30 g 3    melatonin 3 mg Take 1 tablet (3 mg total) by mouth daily at bedtime 30 tablet 0    metoprolol succinate (TOPROL-XL) 25 mg 24 hr tablet 1/2 tab at bedtime when needed for palpitations,  Do not take the evening prior to HD 30 tablet 3    pantoprazole (PROTONIX) 40 mg tablet Take 1 tablet (40 mg total) by mouth daily in the early morning 30 tablet 2    apixaban (Eliquis) 2 5 mg Take 1 tablet (2 5 mg total) by mouth 2 (two) times a day 180 tablet 3    nystatin (MYCOSTATIN) powder Apply topically 3 (three) times a day as needed (rash) 45 g 1     No current facility-administered medications for this visit  No Known Allergies   Immunizations:     Immunization History   Administered Date(s) Administered    COVID-19 PFIZER VACCINE 0 3 ML IM 03/31/2021, 04/21/2021, 12/10/2021    INFLUENZA 10/16/2014, 09/02/2015, 08/15/2016, 09/15/2018    Influenza Quadrivalent Preservative Free 3 years and older IM 10/16/2014    Influenza Split High Dose Preservative Free IM 01/20/2015    Influenza, high dose seasonal 0 7 mL 10/28/2020, 10/27/2021    Pneumococcal Conjugate 13-Valent 04/17/2019    Pneumococcal Polysaccharide PPV23 01/07/2021    Tdap 05/01/2019    Tuberculin Skin Test-PPD Intradermal 01/09/2020, 02/22/2021    Unknown 01/19/2022    Zoster 01/20/2015    Zoster Vaccine Recombinant 09/15/2018, 04/17/2019    influenza, trivalent, adjuvanted 08/28/2019      Health Maintenance:         Topic Date Due    Hepatitis C Screening  Completed     There are no preventive care reminders to display for this patient  Medicare Health Risk Assessment:     /70   Pulse 91   Temp 98 °F (36 7 °C) (Temporal)   Resp 16   Ht 5' 8" (1 727 m)   Wt 81 6 kg (180 lb)   SpO2 99%   BMI 27 37 kg/m²      Amy Kinjal is here for his Subsequent Wellness visit  Health Risk Assessment:   Patient rates overall health as poor  Patient feels that their physical health rating is same  Patient is satisfied with their life  Eyesight was rated as same  Hearing was rated as same  Patient feels that their emotional and mental health rating is same  Patients states they are sometimes angry  Patient states they are sometimes unusually tired/fatigued  Pain experienced in the last 7 days has been a lot  Patient's pain rating has been 5/10  Patient states that he has experienced no weight loss or gain in last 6 months  Depression Screening:   PHQ-2 Score: 0      Fall Risk Screening: In the past year, patient has experienced: no history of falling in past year      Home Safety:  Patient does not have trouble with stairs inside or outside of their home  Patient has working smoke alarms and has working carbon monoxide detector  Home safety hazards include: none  Nutrition:   Current diet is Regular  Medications:   Patient is currently taking over-the-counter supplements  OTC medications include: see medication list  Patient is able to manage medications  Activities of Daily Living (ADLs)/Instrumental Activities of Daily Living (IADLs):   Walk and transfer into and out of bed and chair?: Yes  Dress and groom yourself?: Yes    Bathe or shower yourself?: Yes    Feed yourself? Yes  Do your laundry/housekeeping?: No  Manage your money, pay your bills and track your expenses?: Yes  Make your own meals?: Yes    Do your own shopping?: Yes    Previous Hospitalizations:   Any hospitalizations or ED visits within the last 12 months?: Yes    How many hospitalizations have you had in the last year?: 1-2    Advance Care Planning:   Living will:  Yes Durable POA for healthcare: Yes    Advanced directive: Yes      Cognitive Screening:   Provider or family/friend/caregiver concerned regarding cognition?: No    PREVENTIVE SCREENINGS      Cardiovascular Screening:    General: Screening Not Indicated and History Lipid Disorder      Diabetes Screening:     General: Screening Current      Colorectal Cancer Screening:     General: Screening Not Indicated      Prostate Cancer Screening:    General: Screening Not Indicated      Osteoporosis Screening:    General: Patient Declines      Abdominal Aortic Aneurysm (AAA) Screening:        General: Screening Not Indicated      Lung Cancer Screening:     General: Screening Not Indicated      Hepatitis C Screening:    General: Screening Current    Screening, Brief Intervention, and Referral to Treatment (SBIRT)    Screening  Typical number of drinks in a day: 0  Typical number of drinks in a week: 0  Interpretation: Low risk drinking behavior  Single Item Drug Screening:  How often have you used an illegal drug (including marijuana) or a prescription medication for non-medical reasons in the past year? never    Single Item Drug Screen Score: 0  Interpretation: Negative screen for possible drug use disorder    Brief Intervention  Alcohol & drug use screenings were reviewed  No concerns regarding substance use disorder identified         Mahsa Flores

## 2022-02-09 NOTE — PROGRESS NOTES
Cardiology Follow Up    [facility]  1940  [address] [facility] [address]  [phone]    1  Dissection of thoracic aorta (HCC)     2  Paroxysmal atrial fibrillation (HCC)  POCT ECG    apixaban (Eliquis) 2 5 mg    Echo complete w/ contrast if indicated   3  Benign essential hypertension  Echo complete w/ contrast if indicated   4  Chronic diastolic CHF (congestive heart failure) ([address] )     5  S/P aorta repair     6  Anticoagulation goal of INR 2 to 3     7  Nonrheumatic tricuspid valve regurgitation         Discussion/Summary:    79-year-old man with a history of a type a dissection  Surgery was done in December of 2019  Postoperative paroxysmal atrial fibrillation though recently has been maintained sinus rhythm  Chronic kidney disease stage 5 and has been on dialysis  This manages his volume status  History of severe tricuspid regurgitation but preserved RV function  Mild aortic stenosis  Pretty prominent murmur is heard on auscultation  Issues with regulation of his INR, I suspect most likely this is in the setting of him not taking his medication regularly  Discussed the dangers particularly with warfarin in this regard  I will try to switch him to Eliquis  Still needs to be taking all his medications regularly, but at least we can avoid the issues with over shooting or under shooting the INRs if he is not consistent with the warfarin  It also seems to be pretty difficult from a logistical standpoint as phone calls are made down to family member in [place] and then the medications are regulated by his nephew who is currently living with him  No other changes made to the medications  Volume via dialysis  Echocardiogram was previously ordered by our nurse practitioner for follow-up of his valve disease    I have reordered this as it was never scheduled  Has mild aortic stenosis and severe tricuspid regurgitation on his last echo in October of 2020  Previous History:  79-year-old man  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, 2019  Postoperatively, acute kidney injury on chronic kidney disease requiring dialysis  Had some renal recovery, but now is back on HD  Postoperatively with paroxysmal atrial fibrillation  He had QT prolongation with amiodarone  He was in and out of atrial fibrillation, but was asymptomatic when he had it  He has been on warfarin for anticoagulation (see below for issues with this)  He also had left pleural effusions which required 2 thoracenteses during hospitalization, but none since  LV function is preserved  Mild AS, severe TR on last echo  Volume via HD    Interval history:    Returns for regular follow-up  Our [facility] practitioner contacted me earlier seeing that he was going to have a visit  It seems his INRs have been very difficult to regulate  He has been consistently low  In fact, his INR has not been therapeutic since mid December  She usually communicates with the patient's daughter who lives down in [place]  The patient tells me that his nephew who lives with him is the 1 who does his medications  I suspect that maybe his daughter is communicating with him  However, he also tells me that he may be missing some medications a few times a week because the small ones may be falling  His blood pressure is controlled  He is really without any symptoms and denies any major complaints  Problem List     Rotator cuff tear arthropathy, right (Chronic)    Secondary osteoarthritis of right shoulder    Aftercare following surgery of the musculoskeletal system    Benign essential hypertension (Chronic)    Abnormal TSH    Overweight (BMI 25 0-29  9)    Chronic midline back pain    Benign hypertension with CKD (chronic kidney disease) stage III (HCC) (Chronic)    Benign prostatic hyperplasia without lower urinary tract symptoms    Bradycardia    SOBOE (shortness of breath on exertion)    Dissection of thoracic aorta ([facility])    S/P aorta repair    Postoperative anemia due to acute blood loss    Anticoagulation goal of INR 2 to 3    AFSHIN (acute kidney injury) ([facility] )    Hyperphosphatemia    Hypotension    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    Complication of vascular dialysis catheter    Pneumonia    Hyperlipidemia    Muscle spasm    Sleep disorder        Past Medical History:   Diagnosis Date    Acute renal failure superimposed on stage 4 chronic kidney disease ([place] [address] ) 10/4/2020    Arthritis     BPH without urinary obstruction     CKD (chronic kidney disease), stage III ([facility])     baseline 1 6-1 7    Dialysis patient ([place] [address] )     GERD (gastroesophageal reflux disease)     Hyperlipidemia     Hypertension     MI (myocardial infarction) ([place] [address] )      Social History     Tobacco Use    Smoking status: Never Smoker    Smokeless tobacco: Never Used   Vaping Use    Vaping Use: Never used   Substance Use Topics    Alcohol use: Not Currently    Drug use: Not Currently      Family History   Problem Relation Age of Onset    No Known Problems Mother     Hypertension Father     Arthritis Family     No Known Problems Daughter      Past Surgical History:   Procedure Laterality Date    APPENDECTOMY      CARDIAC SURGERY      COLONOSCOPY  2017    FRACTURE SURGERY      IR AV FISTULA/GRAFT DECLOT  9/3/2021    IR AV FISTULA/GRAFT DECLOT  9/30/2021    IR AV FISTULA/GRAFT DECLOT  1/7/2022    IR PERITONEAL DIALYSIS CATHETER PLACEMENT  2/3/2021    IR PERITONEAL DIALYSIS CATHETER REMOVAL  2/17/2021    IR PERITONEAL DIALYSIS CATHETER REMOVAL AND PLACEMENT NEW SITE  2/9/2021    IR TEMPORARY DIALYSIS CATHETER PLACEMENT  12/23/2019    IR THORACENTESIS  12/24/2019    IR THORACENTESIS  12/27/2019    IR TUNNELED CENTRAL LINE REMOVAL  4/27/2021    IR TUNNELED DIALYSIS CATHETER CHECK/CHANGE/REPOSITION/ANGIOPLASTY  1/6/2020    IR TUNNELED DIALYSIS CATHETER PLACEMENT  1/2/2020    IR TUNNELED DIALYSIS CATHETER PLACEMENT  2/15/2021    IR TUNNELED DIALYSIS CATHETER REMOVAL  3/4/2020    AL ANASTOMOSIS,AV,ANY SITE Left 3/26/2021    Procedure: LEFT FOREARM LOOP AV GRAFT;  Surgeon: Yenni Rodriges MD;  [facility]: [facility];  Service: Vascular    AL ASCEND AORTA GRAFT W ROOT REPLACMENT  VALVE CONDUIT/CORON RECONSTRUCT N/A 12/15/2019    Procedure: BENTALL PROCEDURE (ASCENDING AORTIC REPAIR) with 26mm Gelweave Graft;  Surgeon: Antony Anthony DO;  [facility];  Service: Cardiac Surgery    AL RECONSTR TOTAL SHOULDER IMPLANT Right 12/5/2017    Procedure: ARTHROPLASTY SHOULDER REVERSE with OPEN CYST EXCISION;  Surgeon: Sudhakar Erickson MD;  [facility];  Service: Orthopedics    SHOULDER SURGERY      WRIST SURGERY         Current Outpatient Medications:     atorvastatin (LIPITOR) 40 mg tablet, TAKE 1 TABLET (40 MG TOTAL) BY MOUTH DAILY, Disp: 90 tablet, Rfl: 0    b complex-vitamin C-folic acid (NEPHROCAPS) 1 mg capsule, Take 1 capsule by mouth daily with dinner, Disp: 30 capsule, Rfl: 0    bumetanide (BUMEX) 2 mg tablet, Take 2 tablets (4 mg total) by mouth 2 (two) times a day, Disp: 120 tablet, Rfl: 5    diphenhydrAMINE (BENADRYL) 25 mg tablet, Take 25 mg by mouth every 6 (six) hours as needed for itching, Disp: , Rfl:      MG capsule, TAKE 1 CAPSULE (100 MG TOTAL) BY MOUTH 2 (TWO) TIMES A DAY, Disp: 30 capsule, Rfl: 3    gabapentin (NEURONTIN) 100 mg capsule, TAKE 1 CAPSULE BY MOUTH DAILY IN THE EVENING, Disp: , Rfl:     ketoconazole (NIZORAL) 2 % cream, Apply topically daily, Disp: 30 g, Rfl: 2    lidocaine (LIDODERM) 5 %, Apply 1 patch topically daily Remove & Discard patch within 12 hours or as directed by MD, Disp: 30 patch, Rfl: 0    lidocaine-prilocaine (EMLA) cream, APPLY TOPICALLY AS NEEDED FOR MILD PAIN (WITH HD SESSIONS) WITH DIALYSIS SESSIONS, Disp: 30 g, Rfl: 3    melatonin 3 mg, Take 1 tablet (3 mg total) by mouth daily at bedtime, Disp: 30 tablet, Rfl: 0    metoprolol succinate (TOPROL-XL) 25 mg 24 hr tablet, 1/2 tab at bedtime when needed for palpitations,  Do not take the evening prior to HD, Disp: 30 tablet, Rfl: 3    nystatin (MYCOSTATIN) powder, Apply topically 3 (three) times a day as needed (rash), Disp: 45 g, Rfl: 1    pantoprazole (PROTONIX) 40 mg tablet, Take 1 tablet (40 mg total) by mouth daily in the early morning, Disp: 30 tablet, Rfl: 2    apixaban (Eliquis) 2 5 mg, Take 1 tablet (2 5 mg total) by mouth 2 (two) times a day, Disp: 180 tablet, Rfl: 3  No Known Allergies    Vitals:    02/09/22 1325   BP: 108/70   BP Location: Right arm   Patient Position: Sitting   Cuff Size: Adult   Pulse: 86   SpO2: 99%   Weight: 81 1 kg (178 lb 14 4 oz)   Height: 5' 8" (1 727 m)     Vitals:    02/09/22 1325   Weight: 81 1 kg (178 lb 14 4 oz)      Height: 5' 8" (172 7 cm)   Body mass index is 27 2 kg/m²  Physical Exam:  GEN: Oliver Sharpe appears well, alert and oriented x 3, pleasant and cooperative   HEENT: pupils equal, round, and reactive to light; extraocular muscles intact  NECK: supple, no carotid bruits   HEART: regular rhythm, 3/6 LULU   LUNGS: minimal basilar rales  ABDOMEN: normal bowel sounds, soft, no tenderness, no distention  EXTREMITIES: no edema  LUE fistula  NEURO: no focal findings   SKIN: ecchymoses present    ROS:  Positive for back pain, fatigue, arthritis, leg cramps  Except as noted in HPI, is otherwise reviewed in detail and a 12 point review of systems is negative    ROS reviewed and is unchanged    Labs:  Lab Results   Component Value Date    K 4 3 12/21/2021     12/21/2021    CREATININE 5 80 (H) 12/21/2021    BUN 65 (H) 12/21/2021    CO2 29 12/21/2021    ALT 39 12/17/2021    AST 46 (H) 12/17/2021    INR 1 59 (H) 02/08/2022    GLUF 93 03/20/2021    HGBA1C 5 3 11/17/2017    WBC 7 55 12/28/2021    HGB 9 4 (L) 12/28/2021    HCT 30 3 (L) 12/28/2021     (L) 12/28/2021       No results found for: CHOL  Lab Results   Component Value Date    LDLCALC 71 09/20/2021    LDLCALC 48 04/30/2021    LDLCALC 32 01/26/2021     Lab Results   Component Value Date    HDL 51 09/20/2021    HDL 37 (L) 04/30/2021    HDL 42 01/26/2021     Lab Results   Component Value Date    TRIG 71 09/20/2021    TRIG 90 04/30/2021    TRIG 69 01/26/2021     Testing:  Echo 10/6/20:  LEFT VENTRICLE:  Systolic function was normal by visual assessment  Ejection fraction was estimated to be 60 %  There were no regional wall motion abnormalities  Wall thickness was moderately increased  Features were consistent with a pseudonormal left ventricular filling pattern, with concomitant abnormal relaxation and increased filling pressure (grade 2 diastolic dysfunction)      RIGHT VENTRICLE:  Systolic pressure was mildly to moderately increased  Estimated peak pressure was 47 mmHg      LEFT ATRIUM:  The atrium was mildly dilated      RIGHT ATRIUM:  The atrium was moderately dilated      MITRAL VALVE:  There was mild annular calcification  There was mild regurgitation      AORTIC VALVE:  Transaortic velocity was increased due to valvular stenosis  There was mild stenosis  There was mild regurgitation      TRICUSPID VALVE:  There was severe regurgitation      PULMONIC VALVE:  There was mild regurgitation      IVC, HEPATIC VEINS:  The inferior vena cava was dilated  Echo:  LEFT VENTRICLE:  Systolic Function: normal  Ejection Fraction: 65%  Cavity size: normal    RIGHT VENTRICLE:  Systolic Function: normal   Cavity size moderately dilated  Hypertrophy (severe LVH) present  AORTIC VALVE:  Leaflets: normal and trileaflet  Leaflet motions normal and normal  Stenosis: none  Regurgitation: trace  MITRAL VALVE:  Leaflets: calcified and normal  Leaflet Motions: normal  Regurgitation: none  Stenosis: none     TRICUSPID VALVE:  Leaflets: normal, annulus severely dilated and dilate annulus  Leaflet Motions: restricted  Stenosis: none  Regurgitation: moderate  PULMONIC VALVE:  Leaflets: normal  Regurgitation: none  Stenosis: none  ASCENDING AORTA:  Size:  normal  Dissection present  AORTIC ARCH:  Size:  normal    OTHER AORTIC FINDINGS:   Hematoma noted that extends from distal arch to mid descending aorta, 0 7 cm in thickness  RIGHT ATRIUM:  Size:  dilated  No spontaneous echo contrast   LEFT ATRIUM:  Size: normal    LEFT ATRIAL APPENDAGE:  Size: normal    ATRIAL SEPTUM:  Intra-atrial septal morphology: normal    VENTRICULAR SEPTUM:  Intra-ventricular septum morphology: normal    OTHER FINDINGS:  Pericardium:  normal  Pleural Effusion:  none  POSTPROCEDURE  LEFT VENTRICLE: Unchanged   RIGHT VENTRICLE:   Systolic Function: mildly depressed  AORTIC VALVE:   Leaflets: native  Stenosis: none  Regurgitation: mild  MITRAL VALVE: Unchanged   TRICUSPID VALVE:   Leaflets: native  Regurgitation: systolic flow reversal in hepatic vein and severe  PULMONIC VALVE: Unchanged   ATRIA: Unchanged   AORTA:   OTHER AORTA FINDINGS: The proximal aorta has been replaced  The mural hematoma on the descending aorta is preserved  Atul Garcia REMOVAL:  Probe Removal: atraumatic  ECHOCARDIOGRAM COMMENTS:  Noted is the absence of right coronary ostia  The left coronary ostia appears to have two vessels arising from it  Confirmed surgically    EKG:  Sinus rhythm, first degree AV block   86 BPM

## 2022-02-13 PROBLEM — M75.102 LEFT ROTATOR CUFF TEAR ARTHROPATHY: Status: ACTIVE | Noted: 2022-01-01

## 2022-02-13 PROBLEM — M12.812 ROTATOR CUFF ARTHROPATHY, LEFT: Status: ACTIVE | Noted: 2022-01-01

## 2022-02-13 PROBLEM — M12.812 LEFT ROTATOR CUFF TEAR ARTHROPATHY: Status: ACTIVE | Noted: 2022-01-01

## 2022-02-13 NOTE — ASSESSMENT & PLAN NOTE
Lab Results   Component Value Date    EGFR 8 12/21/2021    EGFR 6 12/20/2021    EGFR 7 12/19/2021    CREATININE 5 80 (H) 12/21/2021    CREATININE 7 33 (H) 12/20/2021    CREATININE 6 43 (H) 12/19/2021     Hemodialysis Monday, Wednesday, Friday

## 2022-02-13 NOTE — ASSESSMENT & PLAN NOTE
Wt Readings from Last 3 Encounters:   02/09/22 81 1 kg (178 lb 14 4 oz)   02/08/22 81 6 kg (180 lb)   01/07/22 78 9 kg (174 lb)       BP is stable  Volume managed by hemodialysis  Continue cardiology follow up

## 2022-02-13 NOTE — ASSESSMENT & PLAN NOTE
Following with Orthopedics, Dr Rula Blackman  He did have an injection in October, which was not effective  He declines physical therapy  If surgery should be needed, he is not sure he would want to proceed with this, also would likely be a poor surgical candidate  Recommend continued orthopedics follow-up

## 2022-02-24 NOTE — PROGRESS NOTES
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, chronic kidney disease, rheumatoid arthritis, previous ATN in December 2020, aortic dissection repair  Both kappa and lambda light chain are elevated but with a normal ratio consistent with chronic kidney disease  Repeat SPEP showed no evidence of monoclonal protein on 07/2020    At his 12/2020 office visit, he was made prn as his 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  2   History of aortic dissection repair 40/6/9952 complicated by contrast nephropathy, ATN  3  Atrial fibrillation on coumadin which was managed by cardiology  This was changed to eliquis per cardiology 2/9/22       4   Anemia  Patient received Venofer per nephrology 2021  Procrit/ IV iron patient per nephrology prn     5   CKD on HD  Reviewed with patient and his daughter that he was re-referred by Dr Chey Tang regarding coagulopathy  Patient is now on eliquis  F/U prn  HPI: Xander Mejia is a 80 y o  seen for initial consultation 3/11/2020 at the referral of [facility]  Floor accompanied by his wife and daughter regarding abnormal SPEP  He has PMH congestive heart failure, hypertension, osteoarthritis, rheumatoid arthritis, GI bleed, chronic kidney disease, secondary hyperparathyroidism of renal origin, BPH, bradycardia, status post aortic repair, insomnia, av graft placement, pleural effusion s/p thoracentesis  He had emergent replacement of ascending aorta with hemiarch reconstruction aortic valve resuspension 12/15/2019  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    He had peritoneal catheter dialysis  Catheter which was removed during 1/2021 hospitalization       Patient was hospitalized 12/14/19 - 1/7/2020 regarding aortic dissection with intramural hematoma s/p repair  He developed AFSHIN related to contrast nephropathy and post op ATN and was dialysis dependant  He was discharged on coumadin 2nd to atrial fibrillation to be managed at [facility] office  12/29/19:  Urine immunofixation shows no monoclonal gammopathy  Serum immunofixation shows a monoclonal gammopathy identified as IgG lambda (0 18 g/dL)     12/14/19: hemoglobin 14 8 with MCV 92, WBC 9 07, platelet count 805    cr 1 86  Total protein 7 5, albumin 3 1, calcium 9 3     1/2016 - 4/24/2019:  Cr 1 7 range  March 4/20/2020 hemoglobin 9 1, MCV of 91, white blood cell count 8 99, 45% neutrophils, 36% lymphocytes, 16% monocytes, platelet count 746,645     On 05/16/2020 kappa light chain 186, lambda light chain 129 with a ratio of 1 4, IgG 2860, IgA 514, IgM 206, hemoglobin 10, monoclonal gammopathy IgG lambda M protein 0 2     Advanced rheumatoid arthritis with positive RF and significant changes in the hands      Interval History:  Admitted 12/17 - 12/22/21 regarding 2 weeks of black stools, fatigue, lightheadedness, and shortness of breath with exertion  INR was 11 45, hemoglobin 7 8, and blood pressures in the 100/50s  He was given vitamin K  Repeat hemoglobin the evening of admission was found to be 5 8 and the patient was transfused 2 units PRBC  GI and Nephrology were consulted  GI recommended EGD and colonoscopy inpatient once INR became less than 2 5  Nephrology recommended continuation of regularly scheduled Monday/Wednesday/Friday dialysis  Patient received additional doses of vitamin K and his INR and hemoglobin were monitored closely  The patient's INR continued to trend down (1 90 on 12/20) and the patient underwent inpatient EGD and colonoscopy on 12/20 with no overt source of bleeding identified  On the morning of 12/21, the patient was found to have a hemoglobin of 6 8 and was again transfused 1 unit PRBCs   At that time, patient denied any dark stools or other bleeding  Given his unremarkable upper and lower endoscopies, the patient's hemoglobin was monitored following transfusion to determine stability  Hemoglobin following transfusion 12/21 was 7 5  The patient was discharged home on warfarin 1 mg daily as well as protonix 40 mg daily  A follow up INR was ordered for the patient on 12/27/21 and a follow up CBC was ordered for the patient on 12/29/21  The patient was given follow up referrals for gastroenterology as well as hematology  1/7/22:  AV Fistula/ Graft Declot        Test Results:        Labs:   Lab Results   Component Value Date    HGB 9 4 (L) 12/28/2021    HCT 30 3 (L) 12/28/2021     (H) 12/28/2021     (L) 12/28/2021    WBC 7 55 12/28/2021    NRBC 0 12/28/2021    BANDSPCT 1 01/04/2020    ATYLMPCT 2 (H) 01/03/2020     Lab Results   Component Value Date    K 4 3 12/21/2021     12/21/2021    CO2 29 12/21/2021    BUN 65 (H) 12/21/2021    CREATININE 5 80 (H) 12/21/2021    GLUCOSE 82 06/09/2021    GLUF 93 03/20/2021    CALCIUM 7 7 (L) 12/21/2021    CORRECTEDCA 9 1 12/17/2021    AST 46 (H) 12/17/2021    ALT 39 12/17/2021    ALKPHOS 92 12/17/2021    EGFR 8 12/21/2021       Imaging: No results found  ROS:  As mentioned in HPI & Interval History otherwise 14 point ROS negative  Allergies: No Known Allergies  Current Medications: Reviewed  PMH/FH/SH:  Reviewed      Physical Exam:    There is no height or weight on file to calculate BSA      Ht Readings from Last 3 Encounters:   02/09/22 5' 8" (1 727 m)   02/08/22 5' 8" (1 727 m)   01/07/22 5' 8" (1 727 m)        Wt Readings from Last 3 Encounters:   02/09/22 81 1 kg (178 lb 14 4 oz)   02/08/22 81 6 kg (180 lb)   01/07/22 78 9 kg (174 lb)        Temp Readings from Last 3 Encounters:   02/08/22 98 °F (36 7 °C) (Temporal)   01/07/22 97 5 °F (36 4 °C) (Oral)   12/30/21 98 4 °F (36 9 °C) (Temporal)        BP Readings from Last 3 Encounters:   02/09/22 108/70   02/08/22 110/70 01/07/22 97/60           Physical Exam  Constitutional:       Appearance: He is well-developed  HENT:      Head: Normocephalic and atraumatic  Cardiovascular:      Rate and Rhythm: Normal rate  Heart sounds: Normal heart sounds  Pulmonary:      Effort: Pulmonary effort is normal  No respiratory distress  Breath sounds: Normal breath sounds  Abdominal:      General: Abdomen is flat  Bowel sounds are normal       Palpations: Abdomen is soft  Skin:     General: Skin is warm and dry  Comments: Left arm av fistula   Neurological:      Mental Status: He is alert and oriented to person, place, and time     Psychiatric:         Behavior: Behavior normal          Emergency Contacts:    Extended Emergency Contact Information  Primary Emergency Contact: normabeata  [place] Phone: [phone]  Relation: Daughter  Secondary Emergency Contact: Erika Sharpe  Address: [address] [id number] [address] Phone: [phone]  Relation: Spouse

## 2022-03-18 NOTE — PROGRESS NOTES
Progress Note -Interventional Radiology SUE Sharpe 68 y o  male MRN: [id number]  Unit/Bed#: [facility] 806-01 Encounter: [id number]    Assessment:    68year old male with pmh of ESRD, CHF, A fib on coumadin, s/p IR PD catheter placement 2/3/21, with removal and new placement 2/9/21    Plan:    - patient to start PD today  - coumadin may be restarted today  - patient will need education on PD prior to discharge  - please reach out to IR with any questions/concerns    Patient Active Problem List   Diagnosis    Rotator cuff tear arthropathy, right    Secondary osteoarthritis of right shoulder    Benign essential hypertension    Abnormal TSH    Overweight (BMI 25 0-29  9)    Lumbar pain    Benign prostatic hyperplasia without lower urinary tract symptoms    Bradycardia    Dissection of thoracic aorta (HCC)    S/P aorta repair    Anticoagulation goal of INR 2 to 3    Hyperphosphatemia    Hypotension    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    Complication of vascular dialysis catheter    Hyperlipidemia    Sleep disorder    Encounter for post surgical wound check    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    Monoclonal gammopathy    Paroxysmal atrial fibrillation (HCC)    GONZALES (dyspnea on exertion)    Acute on chronic diastolic (congestive) heart failure (HCC)    Acute renal failure superimposed on stage 4 chronic kidney disease (HCC)    Anemia    Chronic kidney disease-mineral and bone disorder    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 ([address]    Rheumatoid arthritis ([address] )    Atrial fibrillation ([address] )    Insomnia    ESRD (end stage renal disease) (HCC)          Subjective: Pt feels well  Denies abdominal pain  Awaiting PD to start today  Objective:    Vitals:  /78   Pulse 105   Temp 97 7 °F (36 5 °C)   Resp 16   Ht 5' 7" (1 702 m)   Wt 76 1 kg (167 lb 12 8 oz)   SpO2 98%   BMI 26 28 kg/m²   Body mass index is 26 28 kg/m²    Weight (last 2 days) Date/Time   Weight    02/09/21 0600   76 1 (167 8)    02/08/21 0600   79 (174 16)              I/Os:    Intake/Output Summary (Last 24 hours) at 2/10/2021 0929  Last data filed at 2/10/2021 0855  Gross per 24 hour   Intake 1100 ml   Output 1710 ml   Net -610 ml       Invasive Devices     Peripheral Intravenous Line            Peripheral IV 02/07/21 Right Antecubital 2 days    Peripheral IV 02/09/21 Distal;Right;Ventral (anterior) Forearm 1 day                Physical Exam:  General appearance: alert and oriented, in no acute distress  Lungs: clear to auscultation bilaterally  Heart: regular rate and rhythm, S1, S2 normal, no murmur, click, rub or gallop  Abdomen: soft, non-tender; bowel sounds normal; no masses,  no organomegaly and PD dressing with quarter size dried blood                Lab Results and Cultures:   CBC with diff:   Lab Results   Component Value Date    WBC 5 86 02/05/2021    HGB 8 2 (L) 02/05/2021    HCT 25 9 (L) 02/05/2021    MCV 97 02/05/2021    PLT 81 (L) 02/05/2021    MCH 30 8 02/05/2021    MCHC 31 7 02/05/2021    RDW 16 0 (H) 02/05/2021    MPV 11 2 02/05/2021    NRBC 0 02/05/2021      BMP/CMP:  Lab Results   Component Value Date    K 4 1 02/09/2021     02/09/2021    CO2 30 02/09/2021    CO2 24 12/15/2019    BUN 91 (H) 02/09/2021    CREATININE 3 54 (H) 02/09/2021    GLUCOSE 95 12/15/2019    CALCIUM 9 2 02/09/2021    AST 26 02/05/2021    ALT 15 02/05/2021    ALKPHOS 81 02/05/2021    EGFR 16 02/09/2021    EGFR 42 12/29/2017   ,     Coags:   Lab Results   Component Value Date    PTT 61 (H) 02/09/2021    INR 1 71 (H) 02/10/2021   ,   Results from last 7 days   Lab Units 02/10/21  0529 02/09/21  1032 02/09/21  0418 02/09/21  0326 02/08/21  1957 02/08/21  1400 02/08/21  0505 02/06/21  0519   PTT seconds  --  61*  --  51* 49* 48*  --   --    INR  1 71*  --  1 98*  --   --  1 90* 1 93* 1 84*        Lipid Panel: No results found for: CHOL  Lab Results   Component Value Date    HDL 42 01/26/2021     Lab Results   Component Value Date    HDL 42 01/26/2021     Lab Results   Component Value Date    LDLCALC 32 01/26/2021     Lab Results   Component Value Date    TRIG 69 01/26/2021       HgbA1c:   Lab Results   Component Value Date    HGBA1C 5 3 11/17/2017    HGBA1C 5 4 04/25/2017       Blood Culture:   Lab Results   Component Value Date    BLOODCX No Growth After 5 Days  12/26/2019   ,   Urinalysis:   Lab Results   Component Value Date    COLORU Yellow 10/04/2020    COLORU Yellow 09/15/2016    CLARITYU Clear 10/04/2020    CLARITYU Transparent 09/15/2016    SPECGRAV 1 015 10/04/2020    SPECGRAV 1 020 09/15/2016    PHUR 5 5 10/04/2020    PHUR 6 0 09/15/2016    LEUKOCYTESUR Negative 10/04/2020    LEUKOCYTESUR negative 09/15/2016    NITRITE Negative 10/04/2020    NITRITE negative 09/15/2016    PROTEINUA 3+ 09/15/2016    GLUCOSEU Negative 10/04/2020    KETONESU Negative 10/04/2020    KETONESU negative 09/15/2016    BILIRUBINUR Negative 10/04/2020    BILIRUBINUR negative 09/15/2016    BLOODU Negative 10/04/2020    BLOODU negative 09/15/2016   ,   Urine Culture:   Lab Results   Component Value Date    URINECX 10,000-19,000 cfu/ml  03/08/2018   ,   Wound Culure:  No results found for: WOUNDCULT    VTE Pharmacologic Prophylaxis: Warfarin (Coumadin)      Thank you for allowing me to participate in the care of [address]  Please don't hesitate to call, text, email, or TigerText with any questions  This text is generated with voice recognition software  There may be translation, syntax,  or grammatical errors  If you have any questions, please contact the dictating provider      Vamshi Pichardo PA-C [employer]

## 2022-04-18 NOTE — TELEPHONE ENCOUNTER
Requested medication(s) are due for refill today: Yes  Patient has already received a courtesy refill: No  Other reason request has been forwarded to provider: Lucy Alexandra patient     Cardiovascular:  Diuretics - Loop Failed 04/16/2022 12:49 PM   Protocol Details  Cr in normal range and within 360 days

## 2022-04-21 NOTE — ED PROVIDER NOTES
History  Chief Complaint   Patient presents with    Arm Pain     Patient arrives to the ER from home with complaints of b/l arm and leg pain  Pt  had dialysis today  Pt  also complains of shortness of breath  Lungs clear  100% RA  Pt  ambulatory at home  Alert and oriented x 4     Leg Pain     21-year-old male presents to the emergency department with complaints of a generalized pain in the upper and lower extremities  Patient states that he has had ongoing pain and cramping in his limbs for the past several weeks  Also endorses some stiffness of the extremities and joints which is worse in the morning  Patient is currently on dialysis 3 times weekly  He received treatment today  Denies worsening of symptoms in relation to dialysis  Sick that he usually takes Tylenol for his symptoms as needed  Patient is symptom free presently  History provided by:  Patient and relative   used: No    Arm Pain  Associated symptoms: myalgias    Associated symptoms: no abdominal pain, no chest pain, no congestion, no cough, no diarrhea, no ear pain, no fever, no headaches, no nausea, no rhinorrhea, no shortness of breath, no sore throat, no vomiting and no wheezing    Leg Pain  Associated symptoms: no fever        Prior to Admission Medications   Prescriptions Last Dose Informant Patient Reported? Taking?     MG capsule   No No   Sig: TAKE 1 CAPSULE (100 MG TOTAL) BY MOUTH 2 (TWO) TIMES A DAY   apixaban (Eliquis) 2 5 mg   No No   Sig: Take 1 tablet (2 5 mg total) by mouth 2 (two) times a day   atorvastatin (LIPITOR) 40 mg tablet   No No   Sig: TAKE 1 TABLET (40 MG TOTAL) BY MOUTH DAILY   b complex-vitamin C-folic acid (NEPHROCAPS) 1 mg capsule  Child No No   Sig: Take 1 capsule by mouth daily with dinner   bumetanide (BUMEX) 2 mg tablet   No No   Sig: TAKE 2 TABLETS (4 MG TOTAL) BY MOUTH 2 (TWO) TIMES A DAY   diphenhydrAMINE (BENADRYL) 25 mg tablet  Child Yes No   Sig: Take 25 mg by mouth every 6 (six) hours as needed for itching   gabapentin (NEURONTIN) 100 mg capsule   Yes No   Sig: TAKE 1 CAPSULE BY MOUTH DAILY IN THE EVENING   ketoconazole (NIZORAL) 2 % cream   No No   Sig: APPLY TOPICALLY DAILY   lidocaine (LIDODERM) 5 %   No No   Sig: Apply 1 patch topically daily Remove & Discard patch within 12 hours or as directed by MD   lidocaine-prilocaine (EMLA) cream   No No   Sig: Apply topically as needed for mild pain (with HD sessions) With dialysis sessions   melatonin 3 mg   No No   Sig: Take 1 tablet (3 mg total) by mouth daily at bedtime   metoprolol succinate (TOPROL-XL) 25 mg 24 hr tablet   No No   Si/2 TAB AT BEDTIME WHEN NEEDED FOR PALPITATIONS, DO NOT TAKE THE EVENING PRIOR TO HD   midodrine (PROAMATINE) 10 MG tablet   Yes No   Sig: Take 5 mg by mouth   nystatin (MYCOSTATIN) powder   No No   Sig: Apply topically 3 (three) times a day as needed (rash)   pantoprazole (PROTONIX) 40 mg tablet   No No   Sig: TAKE 1 TABLET (40 MG TOTAL) BY MOUTH DAILY IN THE EARLY MORNING   warfarin (COUMADIN) 2 mg tablet   Yes No   Sig: TAKE 1/2 TO 1 TAB DAILY OR AS DIRECTED BY PHYSICIAN      Facility-Administered Medications: None       Past Medical History:   Diagnosis Date    Acute renal failure superimposed on stage 4 chronic kidney disease (HCC) 10/4/2020    Arthritis     BPH without urinary obstruction     CKD (chronic kidney disease), stage III (HCC)     baseline 1 6-1 7    [facility] patient ([facility] )     GERD (gastroesophageal reflux disease)     Hyperlipidemia     Hypertension     MI (myocardial infarction) ([facility] )        Past Surgical History:   Procedure Laterality Date    APPENDECTOMY      CARDIAC SURGERY      COLONOSCOPY  2017    FRACTURE SURGERY      IR AV FISTULA/GRAFT DECLOT  9/3/2021    IR AV FISTULA/GRAFT DECLOT  2021    IR AV FISTULA/GRAFT DECLOT  2022    IR PERITONEAL DIALYSIS CATHETER PLACEMENT  2/3/2021    IR PERITONEAL DIALYSIS CATHETER REMOVAL  2021    IR PERITONEAL DIALYSIS CATHETER REMOVAL AND PLACEMENT NEW SITE  2/9/2021    IR TEMPORARY DIALYSIS CATHETER PLACEMENT  12/23/2019    IR THORACENTESIS  12/24/2019    IR THORACENTESIS  12/27/2019    IR TUNNELED CENTRAL LINE REMOVAL  4/27/2021    IR TUNNELED DIALYSIS CATHETER CHECK/CHANGE/REPOSITION/ANGIOPLASTY  1/6/2020    IR TUNNELED DIALYSIS CATHETER PLACEMENT  1/2/2020    IR TUNNELED DIALYSIS CATHETER PLACEMENT  2/15/2021    IR TUNNELED DIALYSIS CATHETER REMOVAL  3/4/2020    NM ANASTOMOSIS,AV,ANY SITE Left 3/26/2021    Procedure: LEFT FOREARM LOOP AV GRAFT;  Surgeon: Romy Astudillo MD;  [facility];  Service: Vascular    NM ASCEND AORTA GRAFT W ROOT REPLACMENT  VALVE CONDUIT/CORON RECONSTRUCT N/A 12/15/2019    Procedure: BENTALL PROCEDURE (ASCENDING AORTIC REPAIR) with 26mm Gelweave Graft;  Surgeon: Jose Saunders DO;  [facility];  Service: Cardiac Surgery    NM RECONSTR TOTAL SHOULDER IMPLANT Right 12/5/2017    Procedure: ARTHROPLASTY SHOULDER REVERSE with OPEN CYST EXCISION;  Surgeon: Yeimy Mccracken MD;  [facility];  Service: Orthopedics    SHOULDER SURGERY      WRIST SURGERY         Family History   Problem Relation Age of Onset    No Known Problems Mother     Hypertension Father     Arthritis Family     No Known Problems Daughter      I have reviewed and agree with the history as documented  E-Cigarette/Vaping    E-Cigarette Use Never User      E-Cigarette/Vaping Substances    Nicotine No     THC No     CBD No     Flavoring No      Social History     Tobacco Use    Smoking status: Never Smoker    Smokeless tobacco: Never Used   Vaping Use    Vaping Use: Never used   Substance Use Topics    Alcohol use: Not Currently    Drug use: Not Currently       Review of Systems   Constitutional: Negative for activity change, appetite change, chills and fever     HENT: Negative for congestion, dental problem, drooling, ear discharge, ear pain, mouth sores, nosebleeds, rhinorrhea, sore throat and trouble swallowing  Eyes: Negative for pain, discharge and itching  Respiratory: Negative for cough, chest tightness, shortness of breath and wheezing  Cardiovascular: Negative for chest pain and palpitations  Gastrointestinal: Negative for abdominal pain, blood in stool, constipation, diarrhea, nausea and vomiting  Endocrine: Negative for cold intolerance and heat intolerance  Genitourinary: Negative for difficulty urinating, dysuria, flank pain, frequency and urgency  Musculoskeletal: Positive for arthralgias and myalgias  Allergic/Immunologic: Negative for food allergies and immunocompromised state  Neurological: Negative for dizziness, seizures, syncope, weakness, numbness and headaches  Psychiatric/Behavioral: Negative for agitation, behavioral problems and confusion  Physical Exam  Physical Exam  Vitals and nursing note reviewed  Constitutional:       Appearance: He is well-developed  HENT:      Head: Normocephalic and atraumatic  Cardiovascular:      Rate and Rhythm: Normal rate and regular rhythm  Pulmonary:      Effort: Pulmonary effort is normal  No respiratory distress  Breath sounds: No wheezing, rhonchi or rales  Skin:     General: Skin is warm and dry  Neurological:      Mental Status: He is alert and oriented to person, place, and time     Psychiatric:         Mood and Affect: Mood normal          Behavior: Behavior normal          Vital Signs  ED Triage Vitals [04/18/22 1136]   Temperature Pulse Respirations Blood Pressure SpO2   98 3 °F (36 8 °C) 78 18 92/54 98 %      Temp src Heart Rate Source Patient Position - Orthostatic VS BP Location FiO2 (%)   -- -- -- -- --      Pain Score       --           Vitals:    04/18/22 1136   BP: 92/54   Pulse: 78         Visual Acuity      ED Medications  Medications - No data to display    Diagnostic Studies  Results Reviewed     Procedure Component Value Units Date/Time    HS Troponin 0hr (reflex protocol) [[id number]]  (Normal) Collected: 04/18/22 1217    Lab Status: Final result Specimen: Blood from Arm, Right Updated: 04/18/22 1318     hs TnI 0hr 39 ng/L     Comprehensive metabolic panel [[id number]]  (Abnormal) Collected: 04/18/22 1217    Lab Status: Final result Specimen: Blood from Arm, Right Updated: 04/18/22 1308     Sodium 140 mmol/L      Potassium 3 7 mmol/L      Chloride 104 mmol/L      CO2 32 mmol/L      ANION GAP 4 mmol/L      BUN 26 mg/dL      Creatinine 5 06 mg/dL      Glucose 74 mg/dL      Calcium 8 0 mg/dL      Corrected Calcium 9 4 mg/dL      AST 68 U/L      ALT 34 U/L      Alkaline Phosphatase 116 U/L      Total Protein 7 1 g/dL      Albumin 2 3 g/dL      Total Bilirubin 0 76 mg/dL      eGFR 9 ml/min/1 73sq m     Narrative:      National Kidney Disease Foundation guidelines for Chronic Kidney Disease (CKD):     Stage 1 with normal or high GFR (GFR > 90 mL/min/1 73 square meters)    Stage 2 Mild CKD (GFR = 60-89 mL/min/1 73 square meters)    Stage 3A Moderate CKD (GFR = 45-59 mL/min/1 73 square meters)    Stage 3B Moderate CKD (GFR = 30-44 mL/min/1 73 square meters)    Stage 4 Severe CKD (GFR = 15-29 mL/min/1 73 square meters)    Stage 5 End Stage CKD (GFR <15 mL/min/1 73 square meters)  Note: GFR calculation is accurate only with a steady state creatinine    CBC and differential [[id number]]  (Abnormal) Collected: 04/18/22 1217    Lab Status: Final result Specimen: Blood from Arm, Right Updated: 04/18/22 1306     WBC 3 79 Thousand/uL      RBC 2 91 Million/uL      Hemoglobin 9 7 g/dL      Hematocrit 29 8 %       fL      MCH 33 3 pg      MCHC 32 6 g/dL      RDW 16 1 %      MPV 10 7 fL      Platelets 92 Thousands/uL      nRBC 0 /100 WBCs      Neutrophils Relative 60 %      Immat GRANS % 1 %      Lymphocytes Relative 16 %      Monocytes Relative 18 %      Eosinophils Relative 5 %      Basophils Relative 0 %      Neutrophils Absolute 2 31 Thousands/µL      Immature Grans Absolute 0 02 Thousand/uL      Lymphocytes Absolute 0 60 Thousands/µL      Monocytes Absolute 0 68 Thousand/µL      Eosinophils Absolute 0 17 Thousand/µL      Basophils Absolute 0 01 Thousands/µL                  No orders to display              Procedures  Procedures         ED Course                                             MDM  Number of Diagnoses or Management Options  Arthralgia, unspecified joint  Diagnosis management comments: Differential diagnosis includes but not limited to:  Muscle cramping, arthritis         Amount and/or Complexity of Data Reviewed  Clinical lab tests: ordered and reviewed  Review and summarize past medical records: yes        Disposition  Final diagnoses:   Arthralgia, unspecified joint     Time reflects when diagnosis was documented in both MDM as applicable and the Disposition within this note     Time User Action Codes Description Comment    4/18/2022  2:16 PM Manolo Lundy Add [M25 50] Arthralgia, unspecified joint       ED Disposition     ED Disposition Condition Date/Time Comment    Discharge Stable Mon Apr 18, 2022  2:16 PM [facility] discharge to home/self care              Follow-up Information     Follow up With Specialties Details Why Contact Info Additional Information    Mahsa Real, [address] [facility], Nurse Practitioner Schedule an appointment as soon as possible for a visit   [address] [place] [address] 56       [facility] Schedule an appointment as soon as possible for a visit   [address] [facility], [address] [place] [address], [address]          Discharge Medication List as of 4/18/2022  2:17 PM      START taking these medications    Details   bumetanide (BUMEX) 2 mg tablet TAKE 2 TABLETS (4 MG TOTAL) BY MOUTH 2 (TWO) TIMES A DAY, Starting Mon 4/18/2022, Normal CONTINUE these medications which have NOT CHANGED    Details   apixaban (Eliquis) 2 5 mg Take 1 tablet (2 5 mg total) by mouth 2 (two) times a day, Starting Wed 2/9/2022, Normal      atorvastatin (LIPITOR) 40 mg tablet TAKE 1 TABLET (40 MG TOTAL) BY MOUTH DAILY, Starting Mon 4/4/2022, Normal      b complex-vitamin C-folic acid (NEPHROCAPS) 1 mg capsule Take 1 capsule by mouth daily with dinner, Starting Wed 2/17/2021, Normal      diphenhydrAMINE (BENADRYL) 25 mg tablet Take 25 mg by mouth every 6 (six) hours as needed for itching, Historical Med       MG capsule TAKE 1 CAPSULE (100 MG TOTAL) BY MOUTH 2 (TWO) TIMES A DAY, Normal      gabapentin (NEURONTIN) 100 mg capsule TAKE 1 CAPSULE BY MOUTH DAILY IN THE EVENING, Historical Med      ketoconazole (NIZORAL) 2 % cream APPLY TOPICALLY DAILY, Starting Wed 3/16/2022, Normal      lidocaine (LIDODERM) 5 % Apply 1 patch topically daily Remove & Discard patch within 12 hours or as directed by MD, Starting Thu 12/23/2021, Normal      lidocaine-prilocaine (EMLA) cream Apply topically as needed for mild pain (with HD sessions) With dialysis sessions, Starting Mon 3/21/2022, Normal      melatonin 3 mg Take 1 tablet (3 mg total) by mouth daily at bedtime, Starting Wed 12/22/2021, Normal      metoprolol succinate (TOPROL-XL) 25 mg 24 hr tablet 1/2 TAB AT BEDTIME WHEN NEEDED FOR PALPITATIONS, DO NOT TAKE THE EVENING PRIOR TO HD, Normal      midodrine (PROAMATINE) 10 MG tablet Take 5 mg by mouth, Starting Sat 4/2/2022, Historical Med      nystatin (MYCOSTATIN) powder Apply topically 3 (three) times a day as needed (rash), Starting Tue 2/8/2022, Normal      pantoprazole (PROTONIX) 40 mg tablet TAKE 1 TABLET (40 MG TOTAL) BY MOUTH DAILY IN THE EARLY MORNING, Starting Fri 4/1/2022, Normal      warfarin (COUMADIN) 2 mg tablet TAKE 1/2 TO 1 TAB DAILY OR AS DIRECTED BY PHYSICIAN, Historical Med             No discharge procedures on file      PDMP Review       Value Time User PDMP Reviewed  Yes 12/22/2021  1:56 PM Marilyn Moon MD          ED Provider  Electronically Signed by           Drake Garcia PA-C  04/20/22 2025

## 2022-04-27 NOTE — PROGRESS NOTES
FAMILY PRACTICE OFFICE VISIT       NAME: Oliver Sharep  AGE: [de-identified] y o  SEX: male       : 1940        MRN: [id number]    Assessment and Plan   1  Hepatic cirrhosis, unspecified hepatic cirrhosis type, unspecified whether ascites present [facility])  Comments:  Liver on recent CT abdomen and pelvis suggestive of cirrhosis  Grade I varcies noted on recent EGD with no bleeding  Recommend gastroenterology follow up  Orders:  -     Ambulatory Referral to Gastroenterology; Future    2  Pancreatic mass  Comments:  Possible pancreatic mass, on CT abdomen/pelvis  Will follow up with MRI and gastroenterolgy follow up  Will contact Radiology Department regarding if contrast can be used, as patient is on hemodialysis  Orders:  -     Ambulatory Referral to Gastroenterology; Future  -     MRI abdomen w wo contrast and mrcp; Future; Expected date: 2022    3  Abnormal CT of the abdomen  -     MRI abdomen w wo contrast and mrcp; Future; Expected date: 2022    4  Benign essential hypertension  Comments:  BP is stable on current medications  5  Chronic diastolic CHF (congestive heart failure) ([facility] )  Comments:  Appears euvolemic today  Continue Bumex and hemodialysis  Continue cardiology follow up  6  ESRD (end stage renal disease) ([facility] )  Comments:  Continue hemodialysis, and nephrology follow up      7  Gastrointestinal hemorrhage, unspecified gastrointestinal hemorrhage type  Comments:  GI bleed in December, with EGD and colonoscopy  No findings of cause of bleeding  March was hospitalized in [place] for dark stools and possible GI bleed  Reviewed EGD results completed on , showing 1 column of nonbleeding grade 1 varices in the mid 3rd of the esophagus with no signs of recent bleeding  Continue oral daily PPI  Recommend follow-up with Gastroenterology  Contact information provided today  Hemoglobin stable, 9 7 on   Follow-up in 3-4 months        CT ABDOMEN AN PELVIS RESULTS 3/31/22:    BIBASILAR LINEAR SCARRING VERSUS LINEAR SUBSEGMENTAL ATELECTASIS  EARLY TRACTION BRONCHIECTASIS IN THE MEDIAL ASPECT OF BOTH LOWER   LOBES  BILATERAL POSTERIOR LUNG BASE PLEURAL SCARRING  MILD DIFFUSE   INTERSTITIAL THICKENING, SUGGESTS PRESENCE OF MILD INTERSTITIAL   PULMONARY EDEMA  THE HEART IS NORMAL IN SIZE  CORONARY ARTERY CALCIFICATION/STENTS  NODULAR CONTOUR OF THE LIVER IS SUGGESTIVE OF CIRRHOSIS  PUNCTATE   RIGHT HEPATIC CALCIFICATION  THE LIVER SIZE IS NOT ENLARGED        MILDLY DISTENDED GALLBLADDER WITH MINIMAL PERICHOLECYSTIC FAT   STRANDING, WHICH MAY WELL BE PART OF THE MORE DIFFUSE MESENTERIC FAT   STRANDING REPRESENTING MESENTERIC EDEMA  NO DISCERNIBLE GALLSTONES  MILDLY PROMINENT COMMON BILE DUCT MEASURING UP TO 8 MM, WHICH COULD   BE AGE RELATED  THERE IS NO EVIDENCE OF INTRAHEPATIC BILIARY   DILATATION  MODERATE DIFFUSE PANCREATIC ATROPHY  THERE IS ASYMMETRIC PRESERVED   ENHANCING PANCREATIC TISSUE AT THE PANCREATIC HEAD OUT OF STEP TO THE   REST OF THE ATROPHIC PANCREAS  THE PRESENCE OF AN UNDERLYING MASS AT    THE HEAD CANNOT BE EXCLUDED AS SEEN ON SERIES 3, IMAGES 37-44  FURTHER EVALUATION WITH PANCREAS MRI IS RECOMMENDED AS CLINICALLY   WARRANTED  SPLENOMEGALY, MEASURING UP TO 14 6 CM IN AP DIMENSION  SMALL   SPLENULES INFERIOR TO THE SPLEEN  FEW PUNCTATE CALCIFIED GRANULOMAS   WITHIN THE SPLEEN  MILD NONSPECIFIC THICKENING OF THE BILATERAL ADRENAL GLANDS  MODERATE RIGHT AND MILD LEFT RENAL CORTICAL ATROPHY  NO   HYDRONEPHROSIS OR NEPHROLITHIASIS  LEFT CORTICAL RENAL CYSTS   MEASURING UP TO 2 CM OF THE UPPER POLE  TORTUOSITY AND ECTASIA OF THE ABDOMINAL AORTA AND ILIAC ARTERIES  ATHEROSCLEROTIC CALCIFICATIONS OF THE AORTA AND SOME OF ITS BRANCH   VESSELS  MILD ANEURYSM DILATATION OF THE CELIAC AXIS UP TO 14 MM   DIAMETER  TORTUOSITY AND ECTASIA OF THE SPLENIC ARTERY   8 MM   PARTIALLY CALCIFIED ANEURYSM OF THE DISTAL SPLENIC ARTERY, AS WELL AS CALCIFIED 7 MM ANEURYSM OF THE MID SPLENIC ARTERY  EVIDENCE OF PORTOSYSTEMIC SHUNTING, WITH RECANALIZED PARAUMBILICAL   VEIN, VARICES AROUND THE GASTRIC FUNDUS, GASTROESOPHAGEAL JUNCTION   AND DISTAL ESOPHAGUS, AS WELL AS PERISPLENIC VARICES  COLLATERAL   VESSELS ARE ALSO NOTED IN THE VENTRAL RETROPERITONEAL CAVITY AND   ANTERIOR ABDOMINAL WALL  MILDLY ENLARGED PERIPANCREATIC HEAD AND PORTOCAVAL LYMPH NODES ARE   NOTED, MEASURING UP TO 13 MM IN SHORT AXIS AS SEEN ON SERIES 3, IMAGE   30  TRACE FREE FLUID IN THE ABDOMEN ESPECIALLY AROUND THE LIVER, TRACKING   ALONG THE RIGHT PARACOLIC GUTTER AND MINIMALLY IN THE PELVIS  MILD   DIFFUSE MESENTERIC FAT STRANDING IN PATTERN CONSISTENT WITH   MESENTERIC EDEMA  NO FREE AIR  SMALL SLIDING HIATAL HERNIA  SURGICAL CLIP IS NOTED ADJACENT TO THE   DISTAL ESOPHAGUS JUST ABOVE THE GASTROESOPHAGEAL JUNCTION  NO   EVIDENCE OF SMALL BOWEL OBSTRUCTION OR ACUTE INFLAMMATION  THE   APPENDIX IS NOT VISUALIZED  HOWEVER, THERE ARE NO SECONDARY SIGNS OF   ACUTE APPENDICITIS  COLONIC DIVERTICULOSIS, WITHOUT ACUTE   DIVERTICULITIS  DECOMPRESSION DEGRADES EVALUATION OF THE URINARY BLADDER  MILD   PERIVESICAL STRANDING IS NOTED, AND THERE IS WALL THICKENING OF THE   BLADDER IN EXCESS OF THE DEGREE OF DECOMPRESSION  THESE FINDINGS   RAISE CONCERN FOR ACUTE CYSTITIS  CORRELATION WITH URINALYSIS AND   CULTURE IS RECOMMENDED  ENLARGED PROSTATE GLAND MEASURING UP TO 4 8 CM IN TRANSVERSE   DIMENSION  SURGICAL CLIPS VERSUS BRACHYTHERAPY SEEDS ARE PRESENT   WITHIN THE PROSTATE        SMALL FAT CONTAINING LEFT INGUINAL AND UMBILICAL HERNIAS  DIFFUSE OSSEOUS DEMINERALIZATION  PROMINENT MULTILEVEL DEGENERATIVE   CHANGES OF THE THORACOLUMBAR SPINE  AGE INDETERMINATE COMPRESSION   DEFORMITY OF L3 VERTEBRAL BODY, WITH ABOUT 40% HEIGHT LOSS  MODELING   DEFORMITY OF THE RIGHT INFERIOR PUBIC RAMUS, CONSISTENT WITH A REMOTE   HEALED INJURY   DEGENERATIVE CHANGES OF THE BILATERAL HIP JOINTS  Chief Complaint     Chief Complaint   Patient presents with    Follow-up     4 month        History of Present Illness     Oliver Sharpe is an [de-identified]year old male presenting today for follow up on chronic conditions  Accompanied by his caregiver, [place] today  Hospitalized in [place] for 5 days in March for bleeding in his esophagus as per patient  Has not established with Gastroenterology locally  Decreased appetite  No weight loss, no abdominal pain  Was 172 pounds about 1 month ago  Now 180  He feels well and has no complaints today  Review of Systems   Review of Systems   Constitutional: Negative  HENT: Negative  Respiratory: Negative  Cardiovascular: Negative  Gastrointestinal: Negative  Genitourinary: Negative  Musculoskeletal: Negative  Skin: Negative  Neurological: Negative  Hematological: Negative  Psychiatric/Behavioral: Negative  I have reviewed the patient's medical history in detail; there are no changes to the history as noted in the electronic medical record  Objective     Vitals:    04/27/22 1424   BP: 100/60   Pulse: 100   Resp: 18   Temp: 98 4 °F (36 9 °C)   SpO2: 95%   Weight: 81 9 kg (180 lb 8 oz)   Height: 5' 8" (1 727 m)     Wt Readings from Last 3 Encounters:   04/27/22 81 9 kg (180 lb 8 oz)   02/24/22 81 kg (178 lb 8 oz)   02/09/22 81 1 kg (178 lb 14 4 oz)     Physical Exam  Vitals and nursing note reviewed  Constitutional:       General: He is not in acute distress  Appearance: Normal appearance  He is not ill-appearing  HENT:      Head: Normocephalic and atraumatic  Right Ear: Tympanic membrane normal       Left Ear: Tympanic membrane normal       Mouth/Throat:      Mouth: Mucous membranes are moist       Pharynx: Oropharynx is clear  Eyes:      Conjunctiva/sclera: Conjunctivae normal    Cardiovascular:      Rate and Rhythm: Normal rate and regular rhythm        Heart sounds: No murmur heard       Pulmonary:      Effort: Pulmonary effort is normal  No respiratory distress  Breath sounds: Normal breath sounds  No wheezing or rales  Abdominal:      General: Bowel sounds are normal       Palpations: Abdomen is soft  Tenderness: There is no abdominal tenderness  Musculoskeletal:      Cervical back: Normal range of motion and neck supple  Right lower leg: Edema present  Left lower leg: Edema present  Comments: Trace pitting lower extremity edema  Lymphadenopathy:      Cervical: No cervical adenopathy  Neurological:      Mental Status: He is alert     Psychiatric:         Mood and Affect: Mood normal             ALLERGIES:  No Known Allergies    Current Medications     Current Outpatient Medications   Medication Sig Dispense Refill    apixaban (Eliquis) 2 5 mg Take 1 tablet (2 5 mg total) by mouth 2 (two) times a day 180 tablet 3    atorvastatin (LIPITOR) 40 mg tablet TAKE 1 TABLET (40 MG TOTAL) BY MOUTH DAILY 90 tablet 0    b complex-vitamin C-folic acid (NEPHROCAPS) 1 mg capsule Take 1 capsule by mouth daily with dinner 30 capsule 0    bumetanide (BUMEX) 2 mg tablet TAKE 2 TABLETS (4 MG TOTAL) BY MOUTH 2 (TWO) TIMES A  tablet 5    diphenhydrAMINE (BENADRYL) 25 mg tablet Take 25 mg by mouth every 6 (six) hours as needed for itching       MG capsule TAKE 1 CAPSULE (100 MG TOTAL) BY MOUTH 2 (TWO) TIMES A DAY 30 capsule 3    gabapentin (NEURONTIN) 100 mg capsule TAKE 1 CAPSULE BY MOUTH DAILY IN THE EVENING      ketoconazole (NIZORAL) 2 % cream APPLY TOPICALLY DAILY 30 g 2    lidocaine (LIDODERM) 5 % Apply 1 patch topically daily Remove & Discard patch within 12 hours or as directed by MD 30 patch 0    lidocaine-prilocaine (EMLA) cream Apply topically as needed for mild pain (with HD sessions) With dialysis sessions 30 g 3    melatonin 3 mg Take 1 tablet (3 mg total) by mouth daily at bedtime 30 tablet 0    metoprolol succinate (TOPROL-XL) 25 mg 24 hr tablet 1/2 TAB AT BEDTIME WHEN NEEDED FOR PALPITATIONS, DO NOT TAKE THE EVENING PRIOR TO HD 30 tablet 3    midodrine (PROAMATINE) 10 MG tablet Take 5 mg by mouth      nystatin (MYCOSTATIN) powder Apply topically 3 (three) times a day as needed (rash) 45 g 1    pantoprazole (PROTONIX) 40 mg tablet TAKE 1 TABLET (40 MG TOTAL) BY MOUTH DAILY IN THE EARLY MORNING 30 tablet 2     No current facility-administered medications for this visit           Health Maintenance     Health Maintenance   Topic Date Due    Hepatitis A Vaccine (1 of 2 - Risk 2-dose series) Never done    Hepatitis B Vaccine (1 of 3 - Risk 3-dose series) Never done    Fall Risk  02/08/2023    Depression Screening  02/08/2023    Medicare Annual Wellness Visit (AWV)  02/08/2023    BMI: Followup Plan  02/08/2023    BMI: Adult  04/27/2023    DTaP,Tdap,and Td Vaccines (2 - Td or Tdap) 05/01/2029    Hepatitis C Screening  Completed    Pneumococcal Vaccine: 65+ Years  Completed    Influenza Vaccine  Completed    COVID-19 Vaccine  Completed    HIB Vaccine  Aged Out    IPV Vaccine  Aged Out    Meningococcal ACWY Vaccine  Aged Out    HPV Vaccine  Aged Out     Immunization History   Administered Date(s) Administered    COVID-19 PFIZER VACCINE 0 3 ML IM 03/31/2021, 04/21/2021, 12/10/2021    INFLUENZA 10/16/2014, 09/02/2015, 08/15/2016, 09/15/2018    Influenza Quadrivalent Preservative Free 3 years and older IM 10/16/2014    Influenza Split High Dose Preservative Free IM 01/20/2015    Influenza, high dose seasonal 0 7 mL 10/28/2020, 10/27/2021    Pneumococcal Conjugate 13-Valent 04/17/2019    Pneumococcal Polysaccharide PPV23 01/07/2021    Tdap 05/01/2019    Tuberculin Skin Test-PPD Intradermal 01/09/2020, 02/22/2021    Unknown 01/19/2022    Zoster 01/20/2015    Zoster Vaccine Recombinant 09/15/2018, 04/17/2019    influenza, trivalent, adjuvanted 08/28/2019       EL Lange

## 2022-04-29 NOTE — TELEPHONE ENCOUNTER
Spoke with radiologist, Dr Toñito Jimenez regarding appropriate MRI for follow up on abnormal CT abdomen and pelvis completed in [place]  CT March 31, 2022:  MODERATE DIFFUSE PANCREATIC ATROPHY  THERE IS ASYMMETRIC PRESERVED   ENHANCING PANCREATIC TISSUE AT THE PANCREATIC HEAD OUT OF STEP TO THE   REST OF THE ATROPHIC PANCREAS  THE PRESENCE OF AN UNDERLYING MASS AT    THE HEAD CANNOT BE EXCLUDED AS SEEN ON SERIES 3, IMAGES 37-44  FURTHER EVALUATION WITH PANCREAS MRI IS RECOMMENDED AS CLINICALLY   WARRANTED  As per Dr Barrera Sizer:  MRI with contrast is recommended to have  adequate imaging  Benefit outweighs risk  May do MRI with contrast, late in the day, the day before hemodialysis  Will have office staff contact patient and schedule

## 2022-05-01 PROBLEM — K74.60 HEPATIC CIRRHOSIS (HCC): Status: ACTIVE | Noted: 2022-01-01

## 2022-05-02 PROBLEM — I95.9 HYPOTENSION: Status: ACTIVE | Noted: 2022-01-01

## 2022-05-02 PROBLEM — I48.91 ATRIAL FIBRILLATION WITH RAPID VENTRICULAR RESPONSE (HCC): Status: ACTIVE | Noted: 2022-01-01

## 2022-05-02 PROBLEM — Z98.890 STATUS POST AORTIC DISSECTION REPAIR: Status: ACTIVE | Noted: 2022-01-01

## 2022-05-02 PROBLEM — E87.6 HYPOKALEMIA: Status: ACTIVE | Noted: 2022-01-01

## 2022-05-02 NOTE — TELEPHONE ENCOUNTER
FYI  Daughter called from [facility] stating her father is in ED today with afib  She stated dialysis stopped Metoprolol due to low pressures 2-3 months ago and since then pt has been c/o tachycardia  However, pt was c/o dizziness, pre syncope symptoms that did resolve once he stopped the Metoprolol

## 2022-05-02 NOTE — CONSULTS
Cardiology   [facility] [de-identified] y o  male MRN: [id number]  Unit/Bed#: ED 24 Encounter: [id number]      Reason for Consult / Principal Problem:  Paroxysmal atrial fibrillation with RVR  Physician Requesting Consult:  Nita Oliveira MD    Outpatient Cardiologist:  Dr Greta Sofia  1  Paroxysmal atrial fibrillation with RVR  -Mildly symptomatic w/c/o intermittent palpitations   -24 hour telemetry review - NSR, with intermittent episodes of atrial fibrillation with RVR, HR range 120 to 140 while in AFib   -On apixaban 2 5 mg b i d  (therapeutic for age/renal function)  -Had been taken off of Toprol XL approximately 2-3 months ago possibly by his dialysis center due to low BP - per family report   2  Chronic diastolic CHF  -Appears compensated  -TTE 10/2020 - LVEF 60%, no regional wall motion abnormalities, grade 2 DD, RV normal size/systolic function normal, LA mildly dilated, RA moderately dilated, mild AS/mild AI, severe TR and mild MR   -Volume management - on Bumex 2 mg b i d and receives intermittent HD sessions (M,W,F)  3  Hx of Type A aortic dissection s/p  emergent ascending aorta replacement and hemiarch reconstruction w/AV resuspension 12/2019   4  Chronic hypotension  -BP borderline this admission, last recorded at 93/56  -Previously on midodrine 5 mg daily  5  Dyslipidemia  -Lipid profile 9/2021; cholesterol 136, triglycerides 71, HDL 51, and LDL 71    -On atorvastatin 40 mg daily  6  ESRD  -Receives intermittent HD sessions (M,W,F) as an outpatient, last full session was today  Plan  -Start metoprolol tartrate 25 mg q 12 hours (hold for SBP <95) and assess response to this  He has been off his usual metoprolol succinate for approximately 2-3 months per family report  -Give a dose of IV albumin, and restart midodrine (previously on 5 mg daily) - but will adjust to 2 5 mg TID   Hold afternoon dose of Bumex today - likely can resume tomorrow    -Continue Eliquis 2 5 mg b i d for systemic anticoagulation  -Replete electrolytes to maintain K+level at 4, and mag level at 2  K+ level was 3 2 on admission (currently being repleted), will also check mag level and replete if needed  -Recheck TSH/free t4 level    -Continue monitor on telemetry  HPI: Chang Diaz [de-identified]y o  year old male with a medical history of chronic diastolic CHF, type a aortic dissection s/p  emergent ascending aorta replacement and hemiarch reconstruction w/AV resuspension in 12/2019, paroxysmal atrial fibrillation, essential hypertension, dyslipidemia, and end-stage renal disease on HD  He follows with Dr Daniela Arevalo as an outpatient last seen back in February 2022  Overall reported to have been doing well from a cardiac standpoint  Patient appears to be confused in regards to his outpatient cardiac medications when asked about the specific medications that he takes  He states he has been compliant with his medications that are apparently set up by a family member but is unsure of what he is actually taking  Per documentation today by [facility] Cardiology staff RN patient's daughter had called in to make our department aware that her father had been taken off of his metoprolol approximately 2-3 months ago (unclear as to whom this provider was) given episodes of low BP experienced while at dialysis  Patient states that over the past 1-2 months he has been experiencing intermittent episodes of heart racing/palpitations  He states today while at dialysis today he was told his heart rate was approximately 130 bpm and that his BP was running on the lower side  He states he felt his heart racing slightly towards the end of the dialysis session  He was advised to come into the ED for further evaluation  Further workup in the ED  Hemodynamics on admission  -Temp 98 5° F, , R 16, /68, sat 97% on RA    Laboratory data on admission  -NA +137, K +3 2, chloride 101, CO2 31, anion gap 5, BUN 20, creatinine 3 67, glucose 170, calcium 7 9, AST 50, ALT 34, alk-phos 126, albumin 2 2, WBC 5, HGB 9 8, and platelet count 97  -HS troponin level; 54 -- 53    Imaging  -Chest x-ray - no acute cardiopulmonary disease     -While in the ED he was placed on cardiac monitoring which demonstrated NSR with intermittent episodes of atrial fibrillation with RVR, heart rates ranging in the 120-140 range      Family History:   Family History   Problem Relation Age of Onset    No Known Problems Mother     Hypertension Father     Arthritis Family     No Known Problems Daughter      Historical Information   Past Medical History:   Diagnosis Date    Acute renal failure superimposed on stage 4 chronic kidney disease ([place] [address] ) 10/4/2020    Arthritis     BPH without urinary obstruction     CKD (chronic kidney disease), stage III (HCC)     baseline 1 6-1 7    Dialysis patient ([place] [address] )     GERD (gastroesophageal reflux disease)     Hyperlipidemia     Hypertension     MI (myocardial infarction) ([place] [address] )      Past Surgical History:   Procedure Laterality Date    APPENDECTOMY      CARDIAC SURGERY      COLONOSCOPY  2017    FRACTURE SURGERY      IR AV FISTULA/GRAFT DECLOT  9/3/2021    IR AV FISTULA/GRAFT DECLOT  9/30/2021    IR AV FISTULA/GRAFT DECLOT  1/7/2022    IR PERITONEAL DIALYSIS CATHETER PLACEMENT  2/3/2021    IR PERITONEAL DIALYSIS CATHETER REMOVAL  2/17/2021    IR PERITONEAL DIALYSIS CATHETER REMOVAL AND PLACEMENT NEW SITE  2/9/2021    IR TEMPORARY DIALYSIS CATHETER PLACEMENT  12/23/2019    IR THORACENTESIS  12/24/2019    IR THORACENTESIS  12/27/2019    IR TUNNELED CENTRAL LINE REMOVAL  4/27/2021    IR TUNNELED DIALYSIS CATHETER CHECK/CHANGE/REPOSITION/ANGIOPLASTY  1/6/2020    IR TUNNELED DIALYSIS CATHETER PLACEMENT  1/2/2020    IR TUNNELED DIALYSIS CATHETER PLACEMENT  2/15/2021    IR TUNNELED DIALYSIS CATHETER REMOVAL  3/4/2020    NV ANASTOMOSIS,AV,ANY SITE Left 3/26/2021    Procedure: LEFT FOREARM LOOP AV GRAFT;  Surgeon: Gary Nix MD;  [facility];  Service: Vascular    MS ASCEND AORTA GRAFT W ROOT REPLACMENT  VALVE CONDUIT/CORON RECONSTRUCT N/A 12/15/2019    Procedure: BENTALL PROCEDURE (ASCENDING AORTIC REPAIR) with 26mm Gelweave Graft;  Surgeon: Lori Ruby DO;  [facility];  Service: Cardiac Surgery    MS RECONSTR TOTAL SHOULDER IMPLANT Right 12/5/2017    Procedure: ARTHROPLASTY SHOULDER REVERSE with OPEN CYST EXCISION;  Surgeon: Leigh Ann Bradley MD;  [facility];  Service: Orthopedics    SHOULDER SURGERY      WRIST SURGERY       Social History   Social History     Substance and Sexual Activity   Alcohol Use Not Currently     Social History     Substance and Sexual Activity   Drug Use Not Currently     Social History     Tobacco Use   Smoking Status Never Smoker   Smokeless Tobacco Never Used     Family History:   Family History   Problem Relation Age of Onset    No Known Problems Mother     Hypertension Father     Arthritis Family     No Known Problems Daughter        Review of Systems:  Review of Systems   Constitutional: Negative for chills, fatigue and fever  Eyes: Negative for visual disturbance  Respiratory: Negative for cough, chest tightness and shortness of breath  Cardiovascular: Positive for palpitations  Negative for chest pain and leg swelling  Gastrointestinal: Negative for abdominal pain  Neurological: Negative for dizziness, light-headedness and headaches  All other systems reviewed and are negative  Scheduled Meds:  Continuous Infusions:No current facility-administered medications for this encounter  PRN Meds:   all current active meds have been reviewed and current meds:   Current Facility-Administered Medications   Medication Dose Route Frequency    metoprolol tartrate (LOPRESSOR) tablet 25 mg  25 mg Oral Q12H [address]       No Known Allergies    Objective   Vitals: Blood pressure 102/56, pulse 69, temperature 98 2 °F (36 8 °C), temperature source Oral, resp  rate 16, weight 80 7 kg (178 lb), SpO2 95 %  , Body mass index is 27 06 kg/m² ,   Orthostatic Blood Pressures      Most Recent Value   Blood Pressure 102/56 filed at 05/02/2022 1432   Patient Position - Orthostatic VS Lying filed at 05/02/2022 1158          No intake or output data in the 24 hours ending 05/02/22 1532    Invasive Devices  Report    Central Venous Catheter Line            CVC Central Lines 02/15/21 Double 440 days          Peripheral Intravenous Line            Peripheral IV 05/02/22 Distal;Right;Upper;Ventral (anterior) Arm <1 day          Line            Hemodialysis AV Fistula 03/26/21 Left Forearm 402 days          Hemodialysis Catheter            HD Permanent Double Catheter 440 days              Physical Exam:  Physical Exam  Vitals and nursing note reviewed  Constitutional:       General: He is not in acute distress  Appearance: He is not diaphoretic  HENT:      Head: Normocephalic and atraumatic  Mouth/Throat:      Mouth: Mucous membranes are moist    Eyes:      General: No scleral icterus  Cardiovascular:      Rate and Rhythm: Tachycardia present  Rhythm irregular  Pulses: Normal pulses  Heart sounds: Murmur heard  Pulmonary:      Effort: Pulmonary effort is normal       Breath sounds: Normal breath sounds  No wheezing or rales  Abdominal:      Palpations: Abdomen is soft  Musculoskeletal:      Cervical back: Neck supple  Right lower leg: No edema  Left lower leg: No edema  Skin:     General: Skin is warm and dry  Capillary Refill: Capillary refill takes less than 2 seconds  Neurological:      General: No focal deficit present  Mental Status: He is alert and oriented to person, place, and time     Psychiatric:         Mood and Affect: Mood normal          Lab Results:   Recent Results (from the past 24 hour(s))   CBC and differential    Collection Time: 05/02/22 12:00 PM   Result Value Ref Range    WBC 5 03 4 31 - 10 16 Thousand/uL RBC 2 95 (L) 3 88 - 5 62 Million/uL    Hemoglobin 9 8 (L) 12 0 - 17 0 g/dL    Hematocrit 30 2 (L) 36 5 - 49 3 %     (H) 82 - 98 fL    MCH 33 2 26 8 - 34 3 pg    MCHC 32 5 31 4 - 37 4 g/dL    RDW 15 9 (H) 11 6 - 15 1 %    MPV 11 0 8 9 - 12 7 fL    Platelets 97 (L) 470 - 390 Thousands/uL    nRBC 0 /100 WBCs    Neutrophils Relative 60 43 - 75 %    Immat GRANS % 0 0 - 2 %    Lymphocytes Relative 18 14 - 44 %    Monocytes Relative 19 (H) 4 - 12 %    Eosinophils Relative 3 0 - 6 %    Basophils Relative 0 0 - 1 %    Neutrophils Absolute 3 02 1 85 - 7 62 Thousands/µL    Immature Grans Absolute 0 02 0 00 - 0 20 Thousand/uL    Lymphocytes Absolute 0 90 0 60 - 4 47 Thousands/µL    Monocytes Absolute 0 93 0 17 - 1 22 Thousand/µL    Eosinophils Absolute 0 14 0 00 - 0 61 Thousand/µL    Basophils Absolute 0 02 0 00 - 0 10 Thousands/µL   Comprehensive metabolic panel    Collection Time: 05/02/22 12:00 PM   Result Value Ref Range    Sodium 137 136 - 145 mmol/L    Potassium 3 2 (L) 3 5 - 5 3 mmol/L    Chloride 101 100 - 108 mmol/L    CO2 31 21 - 32 mmol/L    ANION GAP 5 4 - 13 mmol/L    BUN 20 5 - 25 mg/dL    Creatinine 3 67 (H) 0 60 - 1 30 mg/dL    Glucose 170 (H) 65 - 140 mg/dL    Calcium 7 9 (L) 8 3 - 10 1 mg/dL    Corrected Calcium 9 3 8 3 - 10 1 mg/dL    AST 50 (H) 5 - 45 U/L    ALT 34 12 - 78 U/L    Alkaline Phosphatase 126 (H) 46 - 116 U/L    Total Protein 7 4 6 4 - 8 2 g/dL    Albumin 2 2 (L) 3 5 - 5 0 g/dL    Total Bilirubin 0 80 0 20 - 1 00 mg/dL    eGFR 14 ml/min/1 73sq m   HS Troponin 0hr (reflex protocol)    Collection Time: 05/02/22 12:00 PM   Result Value Ref Range    hs TnI 0hr 54 (H) "Refer to ACS Flowchart"- see link ng/L   Protime-INR    Collection Time: 05/02/22 12:00 PM   Result Value Ref Range    Protime 18 0 (H) 11 6 - 14 5 seconds    INR 1 50 (H) 0 84 - 1 19   HS Troponin I 2hr    Collection Time: 05/02/22  2:32 PM   Result Value Ref Range    hs TnI 2hr 53 (H) "Refer to ACS Flowchart"- see link ng/L Delta 2hr hsTnI -1 <20 ng/L     Imaging: I have personally reviewed pertinent reports  and I have personally reviewed pertinent films in PACS  Code Status:  Prior  Epic/ Allscripts/Care Everywhere records reviewed:  Yes    * Please Note: Fluency DirectDictation voice to text software may have been used in the creation of this document   **

## 2022-05-02 NOTE — PLAN OF CARE
Problem: DISCHARGE PLANNING  Goal: Discharge to home or other facility with appropriate resources  Description: INTERVENTIONS:  - Identify barriers to discharge w/patient and caregiver  - Arrange for needed discharge resources and transportation as appropriate  - Identify discharge learning needs (meds, wound care, etc )  - Arrange for interpretive services to assist at discharge as needed  - Refer to Case Management Department for coordinating discharge planning if the patient needs post-hospital services based on physician/advanced practitioner order or complex needs related to functional status, cognitive ability, or social support system  Outcome: Progressing     Problem: Knowledge Deficit  Goal: Patient/family/caregiver demonstrates understanding of disease process, treatment plan, medications, and discharge instructions  Description: Complete learning assessment and assess knowledge base    Interventions:  - Provide teaching at level of understanding  - Provide teaching via preferred learning methods  Outcome: Progressing     Problem: CARDIOVASCULAR - ADULT  Goal: Maintains optimal cardiac output and hemodynamic stability  Description: INTERVENTIONS:  - Monitor I/O, vital signs and rhythm  - Monitor for S/S and trends of decreased cardiac output  - Administer and titrate ordered vasoactive medications to optimize hemodynamic stability  - Assess quality of pulses, skin color and temperature  - Assess for signs of decreased coronary artery perfusion  - Instruct patient to report change in severity of symptoms  Outcome: Progressing  Goal: Absence of cardiac dysrhythmias or at baseline rhythm  Description: INTERVENTIONS:  - Continuous cardiac monitoring, vital signs, obtain 12 lead EKG if ordered  - Administer antiarrhythmic and heart rate control medications as ordered  - Monitor electrolytes and administer replacement therapy as ordered  Outcome: Progressing

## 2022-05-02 NOTE — H&P
[address] 84 [address] 1940, [de-identified] y o  male MRN: [id number]  Unit/Bed#: ED 24 Encounter: [id number]  Primary Care Provider: EL Ching   Date and time admitted to hospital: 5/2/2022 11:55 AM    * Atrial fibrillation with rapid ventricular response ([address] )  Assessment & Plan  · Found in rapid Afib after completing HD today  Metoprolol  was recently removed outpt due to issues with low BP and dizziness  ·  current  · BP 93/56  Pt does have chronically low BP and BP gets lower after HD treatment  · Spoke with Card  Will try to give BB after albumin and Midodrine for BP  · Continue Eliquis  Correct K      ESRD (end stage renal disease) [facility])  Assessment & Plan  Lab Results   Component Value Date    EGFR 14 05/02/2022    EGFR 9 04/18/2022    EGFR 8 12/21/2021    CREATININE 3 67 (H) 05/02/2022    CREATININE 5 06 (H) 04/18/2022    CREATININE 5 80 (H) 12/21/2021     HD M-W-F    Hypotension  Assessment & Plan  BP chronically on low side  100's/70's and takes midodrine 5 mg daily  Will change midodrine to 2 5 mg tid for now  BP lower today after volume removal during HD  Nl WBC and Nl temp    Hold Bumex    Status post aortic dissection repair  Assessment & Plan  S/p emergent ascending aorta replacement and hemiarch reconstruction w/AV resuspension in Dec 4655 complicated by contrast nephropathy/ATN    Hypokalemia  Assessment & Plan  Replete  Repeat K in AM     VTE Prophylaxis: Heparin  / sequential compression device   Code Status: Full code  POLST: POLST form is not discussed and not completed at this time  Discussion with family:     Anticipated Length of Stay:  Patient will be admitted on an Inpatient basis with an anticipated length of stay of  > 2 midnights  Justification for Hospital Stay: Above    Total Time for Visit, including Counseling / Coordination of Care: 30 minutes    Greater than 50% of this total time spent on direct patient counseling and coordination of care  Chief Complaint:   Rapid Afib    History of Present Illness:    Viral Aldridge is a [de-identified] y o  male with PMHx of ESRD on HD M-W-F, prior history of emergent aortic dissection repair, chronic diastolic CHF, AFib on Eliquis, BPH   who presents with rapid AFib after he completed dialysis treatment today  He was sent over to ED for evaluation  Patient denies palpitation, dizziness, chest pain or shortness breath  Of note his metoprolol was discontinued recently due to low blood pressure and dizziness  Patient has been on midodrine for chronic low blood pressure  Review of Systems:    Review of Systems   Constitutional: Positive for fatigue  Negative for chills  Respiratory: Negative for chest tightness, shortness of breath and wheezing  Cardiovascular: Negative for palpitations and leg swelling  Gastrointestinal: Negative for abdominal pain  All other systems reviewed and are negative        Past Medical and Surgical History:     Past Medical History:   Diagnosis Date    Acute renal failure superimposed on stage 4 chronic kidney disease ([facility] [address] ) 10/4/2020    Arthritis     BPH without urinary obstruction     CKD (chronic kidney disease), stage III (HCC)     baseline 1 6-1 7    Dialysis patient ([place] [id number] )     GERD (gastroesophageal reflux disease)     Hyperlipidemia     Hypertension     [facility]  )        Past Surgical History:   Procedure Laterality Date    APPENDECTOMY      CARDIAC SURGERY      COLONOSCOPY  2017    FRACTURE SURGERY      IR AV FISTULA/GRAFT DECLOT  9/3/2021    IR AV FISTULA/GRAFT DECLOT  9/30/2021    IR AV FISTULA/GRAFT DECLOT  1/7/2022    IR PERITONEAL DIALYSIS CATHETER PLACEMENT  2/3/2021    IR PERITONEAL DIALYSIS CATHETER REMOVAL  2/17/2021    IR PERITONEAL DIALYSIS CATHETER REMOVAL AND PLACEMENT NEW SITE  2/9/2021    IR TEMPORARY DIALYSIS CATHETER PLACEMENT  12/23/2019    IR THORACENTESIS  12/24/2019    IR THORACENTESIS  12/27/2019    IR TUNNELED CENTRAL LINE REMOVAL  4/27/2021    IR TUNNELED DIALYSIS CATHETER CHECK/CHANGE/REPOSITION/ANGIOPLASTY  1/6/2020    IR TUNNELED DIALYSIS CATHETER PLACEMENT  1/2/2020    IR TUNNELED DIALYSIS CATHETER PLACEMENT  2/15/2021    IR TUNNELED DIALYSIS CATHETER REMOVAL  3/4/2020    IA ANASTOMOSIS,AV,ANY SITE Left 3/26/2021    Procedure: LEFT FOREARM LOOP AV GRAFT;  Surgeon: Phuong Cadena MD;  [facility];  Service: Vascular    IA ASCEND AORTA GRAFT W ROOT REPLACMENT  VALVE CONDUIT/CORON RECONSTRUCT N/A 12/15/2019    Procedure: BENTALL PROCEDURE (ASCENDING AORTIC REPAIR) with 26mm Gelweave Graft;  Surgeon: Naveen Olivera DO;  [facility];  Service: Cardiac Surgery    IA RECONSTR TOTAL SHOULDER IMPLANT Right 12/5/2017    Procedure: ARTHROPLASTY SHOULDER REVERSE with OPEN CYST EXCISION;  Surgeon: Pauline Smith MD;  [facility];  Service: Orthopedics    SHOULDER SURGERY      WRIST SURGERY         Meds/Allergies:    Prior to Admission medications    Medication Sig Start Date End Date Taking?  Authorizing Provider   apixaban (Eliquis) 2 5 mg Take 1 tablet (2 5 mg total) by mouth 2 (two) times a day 2/9/22  Yes Chema Brooks MD   atorvastatin (LIPITOR) 40 mg tablet TAKE 1 TABLET (40 MG TOTAL) BY MOUTH DAILY 4/4/22  Yes Chema Brooks MD   b complex-vitamin C-folic acid (NEPHROCAPS) 1 mg capsule Take 1 capsule by mouth daily with dinner 2/17/21  Yes Massimo He MD   bumetanide (BUMEX) 2 mg tablet TAKE 2 TABLETS (4 MG TOTAL) BY MOUTH 2 (TWO) TIMES A DAY 4/18/22  Yes Allison Muñoz MD   diphenhydrAMINE (BENADRYL) 25 mg tablet Take 25 mg by mouth every 6 (six) hours as needed for itching   Yes Historical Provider, MD    MG capsule TAKE 1 CAPSULE (100 MG TOTAL) BY MOUTH 2 (TWO) TIMES A DAY 3/1/22  Yes Jad Wells MD   gabapentin (NEURONTIN) 100 mg capsule TAKE 1 CAPSULE BY MOUTH DAILY IN THE EVENING 12/20/21  Yes Historical Provider, MD   ketoconazole (NIZORAL) 2 % cream APPLY TOPICALLY DAILY 3/16/22  Yes EL Mendoza   lidocaine (LIDODERM) 5 % Apply 1 patch topically daily Remove & Discard patch within 12 hours or as directed by MD 12/23/21  Yes Prabhu Red MD   lidocaine-prilocaine (EMLA) cream Apply topically as needed for mild pain (with HD sessions) With dialysis sessions 3/21/22  Yes EL Herrera   melatonin 3 mg Take 1 tablet (3 mg total) by mouth daily at bedtime 12/22/21  Yes Prabhu Red MD   metoprolol succinate (TOPROL-XL) 25 mg 24 hr tablet 1/2 TAB AT BEDTIME WHEN NEEDED FOR PALPITATIONS, DO NOT TAKE THE EVENING PRIOR TO HD 3/21/22  Yes EL Jay   midodrine (PROAMATINE) 10 MG tablet Take 5 mg by mouth 4/2/22  Yes Historical Provider, MD   nystatin (MYCOSTATIN) powder Apply topically 3 (three) times a day as needed (rash) 2/8/22  Yes EL Prieto   pantoprazole (PROTONIX) 40 mg tablet TAKE 1 TABLET (40 MG TOTAL) BY MOUTH DAILY IN THE EARLY MORNING 4/1/22  Yes EL Rivera       Allergies: No Known Allergies    Social History:     Marital Status:   Patient Pre-hospital Living Situation: Home  Patient Pre-hospital Level of Mobility:     Social History     Substance and Sexual Activity   Alcohol Use Not Currently     Social History     Tobacco Use   Smoking Status Never Smoker   Smokeless Tobacco Never Used     Social History     Substance and Sexual Activity   Drug Use Not Currently       Family History:    Family History   Problem Relation Age of Onset    No Known Problems Mother     Hypertension Father     Arthritis Family     No Known Problems Daughter        Physical Exam:     Vitals:   Blood Pressure: 117/59 (05/02/22 1630)  Pulse: (!) 118 (05/02/22 1630)  Temperature: 98 2 °F (36 8 °C) (05/02/22 1432)  Temp Source: Oral (05/02/22 1432)  Respirations: 16 (05/02/22 1630)  Weight - Scale: 80 7 kg (178 lb) (05/02/22 1158)  SpO2: 97 % (05/02/22 1630)    Physical Exam  Constitutional:       General: He is not in acute distress  Appearance: He is not ill-appearing, toxic-appearing or diaphoretic  Eyes:      General:         Right eye: No discharge  Left eye: No discharge  Cardiovascular:      Rate and Rhythm: Tachycardia present  Rhythm irregular  Heart sounds: Murmur heard  Pulmonary:      Effort: Pulmonary effort is normal  No respiratory distress  Breath sounds: Normal breath sounds  No wheezing  Abdominal:      General: Abdomen is flat  Bowel sounds are normal  There is no distension  Palpations: Abdomen is soft  Tenderness: There is no abdominal tenderness  Musculoskeletal:         General: No swelling  Skin:     General: Skin is warm  Coloration: Skin is not jaundiced  Findings: No bruising  Neurological:      Mental Status: He is oriented to person, place, and time  Mental status is at baseline  Psychiatric:         Mood and Affect: Mood normal          Behavior: Behavior normal          Thought Content: Thought content normal          Additional Data:     Lab Results:     Results from last 7 days   Lab Units 05/02/22  1200   WBC Thousand/uL 5 03   HEMOGLOBIN g/dL 9 8*   HEMATOCRIT % 30 2*   PLATELETS Thousands/uL 97*   NEUTROS PCT % 60   LYMPHS PCT % 18   MONOS PCT % 19*   EOS PCT % 3     Results from last 7 days   Lab Units 05/02/22  1200   SODIUM mmol/L 137   POTASSIUM mmol/L 3 2*   CHLORIDE mmol/L 101   CO2 mmol/L 31   BUN mg/dL 20   CREATININE mg/dL 3 67*   ANION GAP mmol/L 5   CALCIUM mg/dL 7 9*   ALBUMIN g/dL 2 2*   TOTAL BILIRUBIN mg/dL 0 80   ALK PHOS U/L 126*   ALT U/L 34   AST U/L 50*   GLUCOSE RANDOM mg/dL 170*     Results from last 7 days   Lab Units 05/02/22  1200   INR  1 50*                   Imaging:     XR chest 1 view portable   ED Interpretation by Ca Cole DO (05/02 1259)   Prominent aortic knob, similar to previous study  Previous sternotomy  Reverse right shoulder arthroplasty        Final Result by Jaylan Bolanos Calixto Arreola DO (05/02 1415)      No acute cardiopulmonary disease  Workstation performed: [id number]             EKG, Pathology, and Other Studies Reviewed on Admission:   · EKG: Rapid Afib     Allscripts / Epic Records Reviewed: Yes     ** Please Note: This note has been constructed using a voice recognition system   **

## 2022-05-02 NOTE — ASSESSMENT & PLAN NOTE
S/p emergent ascending aorta replacement and hemiarch reconstruction w/AV resuspension in Dec 3215 complicated by contrast nephropathy/ATN

## 2022-05-02 NOTE — ED NOTES
Danna Morgan- Granddaughter- [phone] Cell    Call with updates     Jessi Taylor, SKINNY  05/02/22 8879

## 2022-05-02 NOTE — ASSESSMENT & PLAN NOTE
Lab Results   Component Value Date    EGFR 14 05/02/2022    EGFR 9 04/18/2022    EGFR 8 12/21/2021    CREATININE 3 67 (H) 05/02/2022    CREATININE 5 06 (H) 04/18/2022    CREATININE 5 80 (H) 12/21/2021     REMY M-W-F

## 2022-05-02 NOTE — ASSESSMENT & PLAN NOTE
BP chronically on low side  100's/70's and takes midodrine 5 mg daily  Will change midodrine to 2 5 mg tid for now  BP lower today after volume removal during HD    Nl WBC and Nl temp    Hold Bumex

## 2022-05-02 NOTE — ASSESSMENT & PLAN NOTE
· Found in rapid Afib after completing HD today  Metoprolol  was recently removed outpt due to issues with low BP and dizziness  ·  current  · BP 93/56  Pt does have chronically low BP and BP gets lower after HD treatment  · Spoke with Card  Will try to give BB after albumin and Midodrine for BP  · Continue Eliquis   Correct K

## 2022-05-02 NOTE — ED PROVIDER NOTES
History  Chief Complaint   Patient presents with    Rapid Heart Rate     nurse at dialysis noticed patients heart rate in 130's today  pt sent home and care taker called ambulance  pt denies pain or any complaints at this time  hx of afib      Patient is an 81-year-old male with history of end-stage renal disease on hemodialysis, paroxysmal atrial fibrillation and coronary artery disease who presents with palpitations  Patient states that he has experienced heart racing  However he states that this is not new  He was at dialysis and they noted his heart rate to be in the 130s  They recommended he come to the emergency department for evaluation  Patient states that they were able to complete his dialysis  Patient denies any shortness of breath, chest pain, fever, chills, cough or other concerns  Patient was previously on warfarin  He was admitted to the hospital last December with a GI bleed  His current medication list includes Eliquis instead of warfarin  Previous documentation also notes that he was to hold metoprolol due to hypotension from hemodialysis  History provided by:  Patient  Palpitations  Palpitations quality:  Fast  Timing:  Constant  Chronicity:  Recurrent  Ineffective treatments:  None tried  Associated symptoms: lower extremity edema (Baseline)    Associated symptoms: no back pain, no chest pain, no chest pressure, no cough, no diaphoresis, no dizziness, no nausea, no shortness of breath and no vomiting        Prior to Admission Medications   Prescriptions Last Dose Informant Patient Reported? Taking?     MG capsule   No Yes   Sig: TAKE 1 CAPSULE (100 MG TOTAL) BY MOUTH 2 (TWO) TIMES A DAY   apixaban (Eliquis) 2 5 mg   No Yes   Sig: Take 1 tablet (2 5 mg total) by mouth 2 (two) times a day   atorvastatin (LIPITOR) 40 mg tablet   No Yes   Sig: TAKE 1 TABLET (40 MG TOTAL) BY MOUTH DAILY   b complex-vitamin C-folic acid (NEPHROCAPS) 1 mg capsule  Child No Yes   Sig: Take 1 capsule by mouth daily with dinner   bumetanide (BUMEX) 2 mg tablet   No Yes   Sig: TAKE 2 TABLETS (4 MG TOTAL) BY MOUTH 2 (TWO) TIMES A DAY   diphenhydrAMINE (BENADRYL) 25 mg tablet  Child Yes Yes   Sig: Take 25 mg by mouth every 6 (six) hours as needed for itching   gabapentin (NEURONTIN) 100 mg capsule   Yes Yes   Sig: TAKE 1 CAPSULE BY MOUTH DAILY IN THE EVENING   ketoconazole (NIZORAL) 2 % cream   No Yes   Sig: APPLY TOPICALLY DAILY   lidocaine (LIDODERM) 5 %   No Yes   Sig: Apply 1 patch topically daily Remove & Discard patch within 12 hours or as directed by MD   lidocaine-prilocaine (EMLA) cream   No Yes   Sig: Apply topically as needed for mild pain (with HD sessions) With dialysis sessions   melatonin 3 mg   No Yes   Sig: Take 1 tablet (3 mg total) by mouth daily at bedtime   metoprolol succinate (TOPROL-XL) 25 mg 24 hr tablet   No Yes   Si/2 TAB AT BEDTIME WHEN NEEDED FOR PALPITATIONS, DO NOT TAKE THE EVENING PRIOR TO HD   midodrine (PROAMATINE) 10 MG tablet   Yes Yes   Sig: Take 5 mg by mouth   nystatin (MYCOSTATIN) powder   No Yes   Sig: Apply topically 3 (three) times a day as needed (rash)   pantoprazole (PROTONIX) 40 mg tablet   No Yes   Sig: TAKE 1 TABLET (40 MG TOTAL) BY MOUTH DAILY IN THE EARLY MORNING      Facility-Administered Medications: None       Past Medical History:   Diagnosis Date    Acute renal failure superimposed on stage 4 chronic kidney disease ([place] [address] ) 10/4/2020    Arthritis     BPH without urinary obstruction     CKD (chronic kidney disease), stage III (HCC)     baseline 1 6-1 7    Dialysis patient ([place] [address] )     GERD (gastroesophageal reflux disease)     Hyperlipidemia     Hypertension     MI (myocardial infarction) ([place] [address] )        Past Surgical History:   Procedure Laterality Date    APPENDECTOMY      CARDIAC SURGERY      COLONOSCOPY  2017    FRACTURE SURGERY      IR AV FISTULA/GRAFT DECLOT  9/3/2021    IR AV FISTULA/GRAFT DECLOT  2021    IR AV FISTULA/GRAFT DECLOT 1/7/2022    IR PERITONEAL DIALYSIS CATHETER PLACEMENT  2/3/2021    IR PERITONEAL DIALYSIS CATHETER REMOVAL  2/17/2021    IR PERITONEAL DIALYSIS CATHETER REMOVAL AND PLACEMENT NEW SITE  2/9/2021    IR TEMPORARY DIALYSIS CATHETER PLACEMENT  12/23/2019    IR THORACENTESIS  12/24/2019    IR THORACENTESIS  12/27/2019    IR TUNNELED CENTRAL LINE REMOVAL  4/27/2021    IR TUNNELED DIALYSIS CATHETER CHECK/CHANGE/REPOSITION/ANGIOPLASTY  1/6/2020    IR TUNNELED DIALYSIS CATHETER PLACEMENT  1/2/2020    IR TUNNELED DIALYSIS CATHETER PLACEMENT  2/15/2021    IR TUNNELED DIALYSIS CATHETER REMOVAL  3/4/2020    WI ANASTOMOSIS,AV,ANY SITE Left 3/26/2021    Procedure: LEFT FOREARM LOOP AV GRAFT;  Surgeon: Meseret Holbrook MD;  [facility];  Service: Vascular    WI ASCEND AORTA GRAFT W ROOT REPLACMENT  VALVE CONDUIT/CORON RECONSTRUCT N/A 12/15/2019    Procedure: BENTALL PROCEDURE (ASCENDING AORTIC REPAIR) with 26mm Gelweave Graft;  Surgeon: Graciela Nickerson DO;  [facility];  Service: Cardiac Surgery    WI RECONSTR TOTAL SHOULDER IMPLANT Right 12/5/2017    Procedure: ARTHROPLASTY SHOULDER REVERSE with OPEN CYST EXCISION;  Surgeon: Herve Montague MD;  [facility];  Service: Orthopedics    SHOULDER SURGERY      WRIST SURGERY         Family History   Problem Relation Age of Onset    No Known Problems Mother     Hypertension Father     Arthritis Family     No Known Problems Daughter      I have reviewed and agree with the history as documented  E-Cigarette/Vaping    E-Cigarette Use Never User      E-Cigarette/Vaping Substances    Nicotine No     THC No     CBD No     Flavoring No      Social History     Tobacco Use    Smoking status: Never Smoker    Smokeless tobacco: Never Used   Vaping Use    Vaping Use: Never used   Substance Use Topics    Alcohol use: Not Currently    Drug use: Not Currently       Review of Systems   Constitutional: Negative for chills, diaphoresis and fever  HENT: Negative for nosebleeds, sore throat and trouble swallowing  Eyes: Negative for photophobia, pain and visual disturbance  Respiratory: Negative for cough, chest tightness and shortness of breath  Cardiovascular: Positive for palpitations  Negative for chest pain and leg swelling  Gastrointestinal: Negative for abdominal pain, constipation, diarrhea, nausea and vomiting  Endocrine: Negative for polydipsia and polyuria  Genitourinary: Negative for difficulty urinating, dysuria and hematuria  Musculoskeletal: Negative for back pain, neck pain and neck stiffness  Skin: Negative for pallor and rash  Neurological: Negative for dizziness, syncope, light-headedness and headaches  All other systems reviewed and are negative  Physical Exam  Physical Exam  Vitals and nursing note reviewed  Constitutional:       General: He is not in acute distress  Appearance: He is well-developed  HENT:      Head: Normocephalic and atraumatic  Eyes:      Pupils: Pupils are equal, round, and reactive to light  Cardiovascular:      Rate and Rhythm: Regular rhythm  Tachycardia present  Pulses: Normal pulses  Heart sounds: Normal heart sounds  Pulmonary:      Effort: Pulmonary effort is normal  No respiratory distress  Breath sounds: Examination of the right-lower field reveals rhonchi  Rhonchi present  Abdominal:      General: There is no distension  Palpations: Abdomen is soft  Abdomen is not rigid  Tenderness: There is no abdominal tenderness  There is no guarding or rebound  Musculoskeletal:         General: No tenderness  Normal range of motion  Cervical back: Normal range of motion and neck supple  Lymphadenopathy:      Cervical: No cervical adenopathy  Skin:     General: Skin is warm and dry  Capillary Refill: Capillary refill takes less than 2 seconds  Neurological:      Mental Status: He is alert and oriented to person, place, and time  Cranial Nerves: No cranial nerve deficit  Sensory: No sensory deficit  Vital Signs  ED Triage Vitals   Temperature Pulse Respirations Blood Pressure SpO2   05/02/22 1206 05/02/22 1158 05/02/22 1158 05/02/22 1158 05/02/22 1158   98 5 °F (36 9 °C) (!) 127 16 106/68 97 %      Temp Source Heart Rate Source Patient Position - Orthostatic VS BP Location FiO2 (%)   05/02/22 1206 05/02/22 1158 05/02/22 1158 05/02/22 1158 --   Oral Monitor Lying Right arm       Pain Score       --                  Vitals:    05/02/22 1158 05/02/22 1200 05/02/22 1432   BP: 106/68 106/68 102/56   Pulse: (!) 127 (!) 126 69   Patient Position - Orthostatic VS: Lying           Visual Acuity      ED Medications  Medications   potassium chloride (K-DUR,KLOR-CON) CR tablet 20 mEq (20 mEq Oral Given 5/2/22 1439)       Diagnostic Studies  Results Reviewed     Procedure Component Value Units Date/Time    HS Troponin I 2hr [[id number]] Collected: 05/02/22 1432    Lab Status:  In process Specimen: Blood from Arm, Right Updated: 05/02/22 1438    HS Troponin I 4hr [[id number]]     Lab Status: No result Specimen: Blood     HS Troponin 0hr (reflex protocol) [[id number]]  (Abnormal) Collected: 05/02/22 1200    Lab Status: Final result Specimen: Blood from Arm, Right Updated: 05/02/22 1351     hs TnI 0hr 54 ng/L     Comprehensive metabolic panel [[id number]]  (Abnormal) Collected: 05/02/22 1200    Lab Status: Final result Specimen: Blood from Arm, Right Updated: 05/02/22 1345     Sodium 137 mmol/L      Potassium 3 2 mmol/L      Chloride 101 mmol/L      CO2 31 mmol/L      ANION GAP 5 mmol/L      BUN 20 mg/dL      Creatinine 3 67 mg/dL      Glucose 170 mg/dL      Calcium 7 9 mg/dL      Corrected Calcium 9 3 mg/dL      AST 50 U/L      ALT 34 U/L      Alkaline Phosphatase 126 U/L      Total Protein 7 4 g/dL      Albumin 2 2 g/dL      Total Bilirubin 0 80 mg/dL      eGFR 14 ml/min/1 73sq m     Narrative:      [place] guidelines for Chronic Kidney Disease (CKD):     Stage 1 with normal or high GFR (GFR > 90 mL/min/1 73 square meters)    Stage 2 Mild CKD (GFR = 60-89 mL/min/1 73 square meters)    Stage 3A Moderate CKD (GFR = 45-59 mL/min/1 73 square meters)    Stage 3B Moderate CKD (GFR = 30-44 mL/min/1 73 square meters)    Stage 4 Severe CKD (GFR = 15-29 mL/min/1 73 square meters)    Stage 5 End Stage CKD (GFR <15 mL/min/1 73 square meters)  Note: GFR calculation is accurate only with a steady state creatinine    Protime-INR [[id number]]  (Abnormal) Collected: 05/02/22 1200    Lab Status: Final result Specimen: Blood from Arm, Right Updated: 05/02/22 1336     Protime 18 0 seconds      INR 1 50    CBC and differential [[id number]]  (Abnormal) Collected: 05/02/22 1200    Lab Status: Final result Specimen: Blood from Arm, Right Updated: 05/02/22 1319     WBC 5 03 Thousand/uL      RBC 2 95 Million/uL      Hemoglobin 9 8 g/dL      Hematocrit 30 2 %       fL      MCH 33 2 pg      MCHC 32 5 g/dL      RDW 15 9 %      MPV 11 0 fL      Platelets 97 Thousands/uL      nRBC 0 /100 WBCs      Neutrophils Relative 60 %      Immat GRANS % 0 %      Lymphocytes Relative 18 %      Monocytes Relative 19 %      Eosinophils Relative 3 %      Basophils Relative 0 %      Neutrophils Absolute 3 02 Thousands/µL      Immature Grans Absolute 0 02 Thousand/uL      Lymphocytes Absolute 0 90 Thousands/µL      Monocytes Absolute 0 93 Thousand/µL      Eosinophils Absolute 0 14 Thousand/µL      Basophils Absolute 0 02 Thousands/µL                  XR chest 1 view portable   ED Interpretation by Nydia Vargas DO (05/02 1259)   Prominent aortic knob, similar to previous study  Previous sternotomy  Reverse right shoulder arthroplasty  Final Result by Jus Jim DO (05/02 1415)      No acute cardiopulmonary disease                    Workstation performed: [id number]                    Procedures  ECG 12 Lead Documentation Only    Date/Time: 5/2/2022 12:22 PM  Performed by: Aries Travis DO  Authorized by: Aries Travis DO     ECG reviewed by me, the ED Provider: yes    Patient location:  ED  Previous ECG:     Previous ECG:  Unavailable    Comparison to cardiac monitor: Yes    Comments:      Narrow complex, regular tachycardia rate of 124 beats per minute  Normal axis  Normal QRS  Nonspecific ST T wave abnormalities  ECG 12 Lead Documentation Only    Date/Time: 5/2/2022 12:23 PM  Performed by: Aries Travis DO  Authorized by: Aries Travis DO     ECG reviewed by me, the ED Provider: yes    Patient location:  ED  Previous ECG:     Previous ECG:  Compared to current    Similarity:  Changes noted    Comparison to cardiac monitor: Yes    Comments:      He he sinus rhythm at a rate of 76 beats per minute  First degree AV block  Normal axis  Normal QRS  Nonspecific ST T wave abnormalities  Occasional PACs  ED Course  ED Course as of 05/02/22 1459   Mon May 02, 2022   1244 Heart rate currently 76 beats per minute  1319 Hemoglobin(!): 9 8  Baseline                                             MDM  Number of Diagnoses or Management Options  Elevated troponin: new and requires workup  Paroxysmal atrial fibrillation [facility]): new and requires workup  Diagnosis management comments: Presents with tachycardia  He has a history of paroxysmal atrial fibrillation but is currently in sinus rhythm  He has an elevated troponin but no EKG changes and no chest pain  Suspect tachyarrhythmia and end-stage renal disease as cause of elevated troponin  I discussed with Cardiology recommends overnight observation  Portions of the above record have been created with voice recognition software   Occasional wrong word or "sound alike" substitutions may have occurred due to the inherent limitations of voice recognition software   Read the chart carefully and recognize, using context, where substitutions may have occurred           Amount and/or Complexity of Data Reviewed  Clinical lab tests: ordered and reviewed  Tests in the radiology section of CPT®: ordered and reviewed  Tests in the medicine section of CPT®: ordered and reviewed  Review and summarize past medical records: yes  Discuss the patient with other providers: yes  Independent visualization of images, tracings, or specimens: yes    Risk of Complications, Morbidity, and/or Mortality  Presenting problems: high  Diagnostic procedures: moderate  Management options: moderate    Patient Progress  Patient progress: stable      Disposition  Final diagnoses:   Paroxysmal atrial fibrillation ([facility] )   Elevated troponin     Time reflects when diagnosis was documented in both MDM as applicable and the Disposition within this note     Time User Action Codes Description Comment    5/2/2022  2:18 PM Maryam Williamson Add [I48 0] Paroxysmal atrial fibrillation ([facility] )     5/2/2022  2:26 PM Maryam Williamson Add [R77 8] Elevated troponin       ED Disposition     ED Disposition Condition Date/Time Comment    Admit Stable Mon May 2, 2022  2:25 PM Case was discussed with SKYLAR and the patient's admission status was agreed to be Admission Status: observation status to the service of Dr Parveen German  Follow-up Information    None         Patient's Medications   Discharge Prescriptions    No medications on file       No discharge procedures on file      PDMP Review       Value Time User    PDMP Reviewed  Yes 12/22/2021  1:56 PM Sam Turner MD          ED Provider  Electronically Signed by           Jade Lawson DO  05/02/22 4271

## 2022-05-03 PROBLEM — E87.6 HYPOKALEMIA: Status: RESOLVED | Noted: 2022-01-01 | Resolved: 2022-01-01

## 2022-05-03 NOTE — ASSESSMENT & PLAN NOTE
· BP chronically on low side  · Today 80 systolic prior to midodrine  · Will change midodrine to 5 mg tid for now  · Continue BB with hold parameters, may need amio if still hypotensive  · Hold Bumex    · Nephro eval

## 2022-05-03 NOTE — CONSULTS
Consultation - Nephrology   Yfn SEBASTIAN [facility] [de-identified] y o  male MRN: [id number]  Unit/Bed#: S -01 Encounter: [id number]    ASSESSMENT:  1  ESRD on HD ([facility] @ [address]): next dialysis is Wednesday  - EDW 81 5kg (increased from 81kg on 5/2 and 79 5kg on 4/9  2  Access: left AVG  3  Hypotension: BP soft  - decrease dialysate temperature  - increase midodrine to 5mg TID  - now on metoprolol 25mg BID for atrial fibrillation   4  Anemia: hgb mildly below goal  - continue epogen 1800mg qHD  - continue venofer 50mg weekly  5  Secondary Hyperparathyroidism of renal origin: renal diet  - continue hectorol 2mcg qHD  6  Atrial fibrillation- cardiology on board  - started on metoprolol    PLAN:  HD tomorrow if remains inpatient  Monitor BPs with increased metoprolol and midodrine    HISTORY OF PRESENT ILLNESS:  Requesting Physician: Cecilio Gonzalez MD  Reason for Consult: ESRD on HD    Chang Diaz is a [de-identified]y o  year old male who was admitted to [address] [place] [address] after presenting with rapid heart rate after atrial fibrillation  A renal consultation is requested today for assistance in the management of ESRD  Chang Diaz is a known ESRD patient who undergoes maintenance hemodialysis at [address] on MWF  His last treatment was Monday for a full treatment  He did have a rapid heart rate at the completion of treatment however  He denies SOB  He denies LE edema  He denies dizziness, lightheadedness, nausea, vomiting, pain        PAST MEDICAL HISTORY:  Past Medical History:   Diagnosis Date    [facility] 10/4/2020    Arthritis     BPH without urinary obstruction     CKD (chronic kidney disease), stage III (HCC)     baseline 1 6-1 7    Dialysis patient ([place] [address] )     GERD (gastroesophageal reflux disease)     Hyperlipidemia     Hypertension     MI (myocardial infarction) ([facility] )        PAST SURGICAL HISTORY:  Past Surgical History:   Procedure Laterality Date    APPENDECTOMY      CARDIAC SURGERY      COLONOSCOPY  2017    FRACTURE SURGERY      IR AV FISTULA/GRAFT DECLOT  9/3/2021    IR AV FISTULA/GRAFT DECLOT  9/30/2021    IR AV FISTULA/GRAFT DECLOT  1/7/2022    IR PERITONEAL DIALYSIS CATHETER PLACEMENT  2/3/2021    IR PERITONEAL DIALYSIS CATHETER REMOVAL  2/17/2021    IR PERITONEAL DIALYSIS CATHETER REMOVAL AND PLACEMENT NEW SITE  2/9/2021    IR TEMPORARY DIALYSIS CATHETER PLACEMENT  12/23/2019    IR THORACENTESIS  12/24/2019    IR THORACENTESIS  12/27/2019    IR TUNNELED CENTRAL LINE REMOVAL  4/27/2021    IR TUNNELED DIALYSIS CATHETER CHECK/CHANGE/REPOSITION/ANGIOPLASTY  1/6/2020    IR TUNNELED DIALYSIS CATHETER PLACEMENT  1/2/2020    IR TUNNELED DIALYSIS CATHETER PLACEMENT  2/15/2021    IR TUNNELED DIALYSIS CATHETER REMOVAL  3/4/2020    VT ANASTOMOSIS,AV,ANY SITE Left 3/26/2021    Procedure: LEFT FOREARM LOOP AV GRAFT;  Surgeon: Pushpa Arrieta MD;  [facility];  Service: Vascular    VT ASCEND AORTA GRAFT W ROOT REPLACMENT  VALVE CONDUIT/CORON RECONSTRUCT N/A 12/15/2019    Procedure: BENTALL PROCEDURE (ASCENDING AORTIC REPAIR) with 26mm Gelweave Graft;  Surgeon: Edvin Linder DO;  [facility];  Service: Cardiac Surgery    VT RECONSTR TOTAL SHOULDER IMPLANT Right 12/5/2017    Procedure: ARTHROPLASTY SHOULDER REVERSE with OPEN CYST EXCISION;  Surgeon: Jamie Medina MD;  [facility];  Service: Orthopedics    SHOULDER SURGERY      WRIST SURGERY         ALLERGIES:  No Known Allergies    SOCIAL HISTORY:  Social History     Substance and Sexual Activity   Alcohol Use Not Currently     Social History     Substance and Sexual Activity   Drug Use Not Currently     Social History     Tobacco Use   Smoking Status Never Smoker   Smokeless Tobacco Never Used       FAMILY HISTORY:  Family History   Problem Relation Age of Onset    No Known Problems Mother     Hypertension Father     Arthritis Family     No Known Problems Daughter        MEDICATIONS:    Current Facility-Administered Medications:     acetaminophen (TYLENOL) tablet 650 mg, 650 mg, Oral, Q6H PRN, Anita Lora MD    apixaban [facility]) tablet 2 5 mg, 2 5 mg, Oral, BID, Anita Lora MD, 2 5 mg at 05/03/22 0910    atorvastatin (LIPITOR) tablet 40 mg, 40 mg, Oral, Daily With Clara Saeed MD, 40 mg at 05/02/22 1807    b complex-vitamin C-folic acid (NEPHROCAPS) capsule 1 capsule, 1 capsule, Oral, Daily With Dinner, Anita Lora MD, 1 capsule at 05/02/22 1807    diphenhydrAMINE (BENADRYL) tablet 25 mg, 25 mg, Oral, Q6H PRN, Anita Lora MD  [employer]  Ailin Kim ON 5/4/2022] doxercalciferol (HECTOROL) capsule 2 mcg, 2 mcg, Oral, After Dialysis, [facility], PA-C    [START ON 5/4/2022] epoetin uday (EPOGEN,PROCRIT) injection 1,800 Units, 1,800 Units, Intravenous, After Dialysis, [facility], PA-    gabapentin (NEURONTIN) capsule 100 mg, 100 mg, Oral, HS, Anita Lora MD, 100 mg at 05/02/22 2136    melatonin tablet 3 mg, 3 mg, Oral, HS, Anita Lora MD, 3 mg at 05/02/22 2136    metoprolol succinate (TOPROL-XL) 24 hr tablet 25 mg, 25 mg, Oral, BID, EL Mendoza    midodrine (PROAMATINE) tablet 5 mg, 5 mg, Oral, TID AC, [place], PA-C, 5 mg at 05/03/22 1129    pantoprazole (PROTONIX) EC tablet 40 mg, 40 mg, Oral, Early Morning, Anita Lora MD, 40 mg at 05/03/22 4814    REVIEW OF SYSTEMS:  A complete 10 point review of systems was performed and found to be negative unless otherwise noted below or in the HPI  General: No fevers, chills  Cardiovascular: No chest pain, shortness of breath, palpitations, leg edema  Respiratory: No cough, sputum production, shortness of breath  Gastrointestinal: No nausea, vomiting, abdominal pain, diarrhea  Genitourinary: No hematuria      PHYSICAL EXAM:  Current Weight: Weight - Scale: 80 5 kg (177 lb 7 5 oz)  First Weight: Weight - Scale: 80 7 kg (178 lb)  Vitals:    05/02/22 1758 05/02/22 2227 05/03/22 0849 05/03/22 1034   BP: 96/53 98/52 (!) 80/66 92/66   BP Location: Right arm Right arm Right arm Right arm   Pulse: 67 66 67    Resp: 16 16 18    Temp: 98 2 °F (36 8 °C) 98 °F (36 7 °C) 97 7 °F (36 5 °C)    TempSrc: Oral Oral Oral    SpO2: 98% 98% 97%    Weight: 80 5 kg (177 lb 7 5 oz)      Height: 5' 7" (1 702 m)          Intake/Output Summary (Last 24 hours) at 5/3/2022 1257  Last data filed at 5/2/2022 1612  Gross per 24 hour   Intake 50 ml   Output --   Net 50 ml     General: NAD  Skin: no rash  Eyes: anicteric  ENMT: mm moist  Neck: no masses  Respiratory: CTAB  CVS: RRR  Extremities: no edema  GI: soft nt nd  Neuro: alert awake  Psych: mood and affect appropriate     Lab Results:   Results from last 7 days   Lab Units 05/03/22  0524 05/02/22  1200   WBC Thousand/uL 5 17 5 03   HEMOGLOBIN g/dL 9 0* 9 8*   HEMATOCRIT % 28 0* 30 2*   PLATELETS Thousands/uL 91* 97*   POTASSIUM mmol/L 4 1 3 2*   CHLORIDE mmol/L 101 101   CO2 mmol/L 29 31   BUN mg/dL 30* 20   CREATININE mg/dL 5 22* 3 67*   CALCIUM mg/dL 8 5 7 9*   MAGNESIUM mg/dL  --  2 0   ALK PHOS U/L  --  126*   ALT U/L  --  34   AST U/L  --  50*     Lab Results   Component Value Date     2 (H) 11/11/2020    CALCIUM 8 5 05/03/2022    PHOS 3 8 02/16/2021     I have personally reviewed the blood work as stated above and in my note  I have personally reviewed SLIM and cardiology note

## 2022-05-03 NOTE — ASSESSMENT & PLAN NOTE
· S/p emergent ascending aorta replacement and hemiarch reconstruction w/AV resuspension in Dec 1182 complicated by contrast nephropathy/ATN

## 2022-05-03 NOTE — PROGRESS NOTES
General Cardiology   Progress Note -  Team One   [facility] [de-identified] y o  male MRN: [id number]    Unit/Bed#: S -01 Encounter: [id number]    Assessment  1  Paroxysmal atrial fibrillation with RVR  -Mildly symptomatic w/c/o intermittent palpitations   -24 hour telemetry review - afib, rates currently in the 60s to 80s  -Had been taken off of Toprol XL approximately 2-3 months ago by his dialysis center due to low BP per family report   -Started on metoprolol tartrate 25 mg q12h this admission    -On apixaban 2 5 mg b i d  (therapeutic for age/renal function)  2  Chronic diastolic CHF  -Appears compensated  -TTE 10/2020 - LVEF 60%, no regional wall motion abnormalities, grade 2 DD, RV normal size/systolic function normal, LA mildly dilated, RA moderately dilated, mild AS/mild AI, severe TR and mild MR   -Volume management - on Bumex 4 mg b i d and receives intermittent HD sessions (M,W,F)  3  Hx of Type A aortic dissection s/p  emergent ascending aorta replacement and hemiarch reconstruction w/AV resuspension 12/2019   4  Chronic hypotension  -/60, last recorded at 92/66  -Previously on midodrine 5 mg daily? 5  Dyslipidemia  -Lipid profile 9/2021; cholesterol 136, triglycerides 71, HDL 51, and LDL 71    -On atorvastatin 40 mg daily  6  ESRD  -Receives intermittent HD sessions (M,W,F) as an outpatient, last full session was yesterday     Plan  -Continue metoprolol tartrate 25 mg q12h (hold for SBP <95)  BP- hypotensive at times  Increase midodrine to 5 mg TID  If BP still remains an issue will plan to DC metoprolol an utilize amiodarone as alternative  option    -Continue Eliquis 2 5 mg b i d for systemic anticoagulation  -Replete electrolytes to maintain K+level at 4, and mag level at 2  K+ level was 3 2 on admission (currently being repleted), will also check mag level and replete if needed  -Recheck TSH/free t4 level    -Continue monitor on telemetry      Subjective  Review of Systems   Constitutional: Negative for chills, fever and malaise/fatigue  Eyes: Negative for visual disturbance  Cardiovascular: Negative for chest pain, dyspnea on exertion, leg swelling, orthopnea and palpitations  Respiratory: Negative for cough and shortness of breath  Gastrointestinal: Negative for abdominal pain  Neurological: Negative for dizziness, headaches and light-headedness  Objective:   Physical Exam  Vitals and nursing note reviewed  Constitutional:       General: He is not in acute distress  Appearance: He is not diaphoretic  HENT:      Head: Normocephalic and atraumatic  Mouth/Throat:      Mouth: Mucous membranes are moist    Eyes:      General: No scleral icterus  Cardiovascular:      Rate and Rhythm: Normal rate  Rhythm irregular  Pulses: Normal pulses  Heart sounds: Murmur heard  Pulmonary:      Effort: Pulmonary effort is normal       Breath sounds: Normal breath sounds  No wheezing or rales  Abdominal:      Palpations: Abdomen is soft  Musculoskeletal:      Cervical back: Neck supple  Right lower leg: No edema  Left lower leg: No edema  Skin:     General: Skin is warm and dry  Capillary Refill: Capillary refill takes less than 2 seconds  Neurological:      General: No focal deficit present  Mental Status: He is alert and oriented to person, place, and time  Psychiatric:         Mood and Affect: Mood normal          Vitals: Blood pressure 92/66, pulse 67, temperature 97 7 °F (36 5 °C), temperature source Oral, resp  rate 18, height 5' 7" (1 702 m), weight 80 5 kg (177 lb 7 5 oz), SpO2 97 %  ,     Body mass index is 27 8 kg/m²  ,   Systolic (97BPQ), BKM:051 , Min:80 , TQU:249     Diastolic (46EUW), QCW:93, Min:52, Max:68      Intake/Output Summary (Last 24 hours) at 5/3/2022 1054  Last data filed at 5/2/2022 1612  Gross per 24 hour   Intake 50 ml   Output --   Net 50 ml     Weight (last 2 days)     Date/Time Weight    05/02/22 1758 80 5 (177 47) 05/02/22 1158 80 7 (178)          LABORATORY RESULTS      CBC with diff:   Results from last 7 days   Lab Units 05/03/22  0524 05/02/22  1200   WBC Thousand/uL 5 17 5 03   HEMOGLOBIN g/dL 9 0* 9 8*   HEMATOCRIT % 28 0* 30 2*   MCV fL 101* 102*   PLATELETS Thousands/uL 91* 97*   MCH pg 32 6 33 2   MCHC g/dL 32 1 32 5   RDW % 16 2* 15 9*   MPV fL 11 2 11 0   NRBC AUTO /100 WBCs 0 0       CMP:  Results from last 7 days   Lab Units 05/03/22  0524 05/02/22  1200   POTASSIUM mmol/L 4 1 3 2*   CHLORIDE mmol/L 101 101   CO2 mmol/L 29 31   BUN mg/dL 30* 20   CREATININE mg/dL 5 22* 3 67*   CALCIUM mg/dL 8 5 7 9*   AST U/L  --  50*   ALT U/L  --  34   ALK PHOS U/L  --  126*   EGFR ml/min/1 73sq m 9 14       BMP:  Results from last 7 days   Lab Units 05/03/22  0524 05/02/22  1200   POTASSIUM mmol/L 4 1 3 2*   CHLORIDE mmol/L 101 101   CO2 mmol/L 29 31   BUN mg/dL 30* 20   CREATININE mg/dL 5 22* 3 67*   CALCIUM mg/dL 8 5 7 9*       Lab Results   Component Value Date    NTBNP 4,828 (H) 01/26/2021    NTBNP 2,792 (H) 11/02/2020    NTBNP 4,285 (H) 10/04/2020        Results from last 7 days   Lab Units 05/02/22  1200   MAGNESIUM mg/dL 2 0             Results from last 7 days   Lab Units 05/03/22  0524   TSH 3RD GENERATON uIU/mL 2 452       Results from last 7 days   Lab Units 05/02/22  1200   INR  1 50*       Lipid Profile:   No results found for: CHOL  Lab Results   Component Value Date    HDL 51 09/20/2021    HDL 37 (L) 04/30/2021    HDL 42 01/26/2021     Lab Results   Component Value Date    LDLCALC 71 09/20/2021    LDLCALC 48 04/30/2021    LDLCALC 32 01/26/2021     Lab Results   Component Value Date    TRIG 71 09/20/2021    TRIG 90 04/30/2021    TRIG 69 01/26/2021       Cardiac testing:   Results for orders placed during the hospital encounter of 10/04/20    Echo complete with contrast if indicated    Narrative  [facility], [address] [facility]  [phone]    Transthoracic Echocardiogram  2D, M-mode, Doppler, and Color Doppler    Study date:  06-Oct-2020    Patient: Fannie Duque  MR number: [id number]  Account number: [de-identified]  : 1940  Age: 68 years  Gender: Male  Status: Inpatient  Location: Bedside  Height: 68 in  Weight: 189 6 lb  BP: 150/ 79 mmHg    Indications: Heart failure  Diagnoses: I50 9 - Heart failure, unspecified    Sonographer:  Imer Gary RDCS  Referring Physician:  Jose E Loo PA-C  Group:  [facility]  Interpreting Physician:  Vern Enriquez MD    SUMMARY    LEFT VENTRICLE:  Systolic function was normal by visual assessment  Ejection fraction was estimated to be 60 %  There were no regional wall motion abnormalities  Wall thickness was moderately increased  Features were consistent with a pseudonormal left ventricular filling pattern, with concomitant abnormal relaxation and increased filling pressure (grade 2 diastolic dysfunction)  RIGHT VENTRICLE:  Systolic pressure was mildly to moderately increased  Estimated peak pressure was 47 mmHg  LEFT ATRIUM:  The atrium was mildly dilated  RIGHT ATRIUM:  The atrium was moderately dilated  MITRAL VALVE:  There was mild annular calcification  There was mild regurgitation  AORTIC VALVE:  Transaortic velocity was increased due to valvular stenosis  There was mild stenosis  There was mild regurgitation  TRICUSPID VALVE:  There was severe regurgitation  PULMONIC VALVE:  There was mild regurgitation  IVC, HEPATIC VEINS:  The inferior vena cava was dilated  HISTORY: PRIOR HISTORY: Atrial fibrillation  Acute congestive heart failure  S/P Aortic repair  CKD4  PROCEDURE: The procedure was performed at the bedside  This was a routine study  The transthoracic approach was used  The study included complete 2D imaging, M-mode, complete spectral Doppler, and color Doppler  The heart rate was 96 bpm,  at the start of the study   Echocardiographic views were limited due to lung interference  This was a technically difficult study  LEFT VENTRICLE: Size was normal  Systolic function was normal by visual assessment  Ejection fraction was estimated to be 60 %  There were no regional wall motion abnormalities  Wall thickness was moderately increased  DOPPLER: The ratio  of early ventricular filling to atrial contraction velocities was within the normal range  Features were consistent with a pseudonormal left ventricular filling pattern, with concomitant abnormal relaxation and increased filling pressure  (grade 2 diastolic dysfunction)  RIGHT VENTRICLE: The size was normal  Systolic function was normal  DOPPLER: Systolic pressure was mildly to moderately increased  Estimated peak pressure was 47 mmHg  LEFT ATRIUM: The atrium was mildly dilated  RIGHT ATRIUM: The atrium was moderately dilated  MITRAL VALVE: There was mild annular calcification  Valve structure was normal  There was mild calcification  There was normal leaflet separation  DOPPLER: The transmitral velocity was within the normal range  There was no evidence for  stenosis  There was mild regurgitation  AORTIC VALVE: The valve was trileaflet  Leaflets exhibited normal thickness, mild to moderate calcification, and mildly reduced cuspal separation  DOPPLER: Transaortic velocity was increased due to valvular stenosis  There was mild  stenosis  There was mild regurgitation  TRICUSPID VALVE: The valve structure was normal  There was normal leaflet separation  DOPPLER: The transtricuspid velocity was within the normal range  There was no evidence for stenosis  There was severe regurgitation  PULMONIC VALVE: Leaflets exhibited normal thickness, no calcification, and normal cuspal separation  DOPPLER: The transpulmonic velocity was within the normal range  There was mild regurgitation  PERICARDIUM: There was no pericardial effusion  AORTA: The root exhibited normal size      SYSTEMIC VEINS: IVC: The inferior vena cava was dilated  SYSTEM MEASUREMENT TABLES    2D  %FS: 29 94 %  Ao Diam: 3 13 cm  EDV(Teich): 101 8 ml  EF(Teich): 57 13 %  ESV(Teich): 43 65 ml  IVSd: 1 41 cm  LA Area: 20 79 cm2  LA Diam: 4 5 cm  LVEDV MOD A4C: 62 85 ml  LVEF MOD A4C: 62 56 %  LVESV MOD A4C: 23 53 ml  LVIDd: 4 69 cm  LVIDs: 3 28 cm  LVLd A4C: 7 79 cm  LVLs A4C: 5 95 cm  LVOT Diam: 2 16 cm  LVPWd: 1 37 cm  RA Area: 31 08 cm2  RVIDd: 4 04 cm  SV MOD A4C: 39 32 ml  SV(Teich): 58 16 ml    CW  AV Env  Ti: 264 51 ms  AV MaxP 04 mmHg  AV VTI: 44 13 cm  AV Vmax: 2 4 m/s  AV Vmean: 1 66 m/s  AV meanP 33 mmHg  TR MaxP 22 mmHg  TR Vmax: 3 05 m/s    MM  TAPSE: 2 49 cm    PW  EDITH (VTI): 1 81 cm2  EDITH Vmax: 1 75 cm2  AVAI (VTI): 0 cm2/m2  AVAI Vmax: 0 cm2/m2  E' Sept: 0 08 m/s  E/E' Sept: 11 75  LVOT Env  Ti: 296 86 ms  LVOT VTI: 21 84 cm  LVOT Vmax: 1 14 m/s  LVOT Vmean: 0 74 m/s  LVOT maxP 24 mmHg  LVOT meanP 5 mmHg  LVSI Dopp: 40 02 ml/m2  LVSV Dopp: 80 04 ml  MV A Chet: 0 67 m/s  MV Dec [place]: 6 53 m/s2  MV DecT: 141 03 ms  MV E Chet: 0 92 m/s  MV E/A Ratio: 1 37  MV PHT: 40 9 ms  MVA By PHT: 5 38 cm2    [employer] Accredited Echocardiography Laboratory    Prepared and electronically signed by    Erika Mayo MD  Signed 06-Oct-2020 13:04:42    No results found for this or any previous visit  No results found for this or any previous visit  No valid procedures specified  No results found for this or any previous visit        Meds/Allergies   all current active meds have been reviewed and current meds:   Current Facility-Administered Medications   Medication Dose Route Frequency    acetaminophen (TYLENOL) tablet 650 mg  650 mg Oral Q6H PRN    apixaban (ELIQUIS) tablet 2 5 mg  2 5 mg Oral BID    atorvastatin (LIPITOR) tablet 40 mg  40 mg Oral Daily With Dinner    b complex-vitamin C-folic acid (NEPHROCAPS) capsule 1 capsule  1 capsule Oral Daily With Dinner    diphenhydrAMINE (BENADRYL) tablet 25 mg  25 mg Oral Q6H PRN    gabapentin (NEURONTIN) capsule 100 mg  100 mg Oral HS    melatonin tablet 3 mg  3 mg Oral HS    metoprolol succinate (TOPROL-XL) 24 hr tablet 25 mg  25 mg Oral BID    midodrine (PROAMATINE) tablet 5 mg  5 mg Oral TID AC    pantoprazole (PROTONIX) EC tablet 40 mg  40 mg Oral Early Morning          Telemetry Review: No significant arrhythmias seen on telemetry review    EKG personally reviewed by EL Ba  Assessment:  Principal Problem:    Atrial fibrillation with rapid ventricular response (HCC)  Active Problems:    ESRD (end stage renal disease) (HCC)    Hepatic cirrhosis (HCC)    Hypotension    Status post aortic dissection repair    Counseling / Coordination of Care  Total floor / unit time spent today 20 minutes  Greater than 50% of total time was spent with the patient and / or family counseling and / or coordination of care  ** Please Note: Dragon 360 Dictation voice to text software may have been used in the creation of this document   **

## 2022-05-03 NOTE — UTILIZATION REVIEW
Inpatient Admission Authorization Request   NOTIFICATION OF INPATIENT ADMISSION/INPATIENT AUTHORIZATION REQUEST   SERVICING FACILITY:   [address]  Tax ID: [id number]  NPI: [id number]  Place of Service: Inpatient [address] [facility]  [address]  Place of Service Code: 24     ATTENDING PROVIDER:  Attending Name and NPI#: Carrie Santizo Md [[id number]]  Address: [address]  Phone: [phone]     UTILIZATION REVIEW CONTACT:  [facility] Utilization   Network Utilization Review Department  Phone: [phone]  Fax: [phone]  Email: [email or web address]     PHYSICIAN ADVISORY SERVICES:  FOR [id number] REVIEW - MEDICAL NECESSITY DENIAL  Phone: [phone]  Fax: [phone]  Email: [email or web address]     TYPE OF REQUEST:  Inpatient Status     ADMISSION INFORMATION:  ADMISSION DATE/TIME: 5/2/22  5:07 PM  PATIENT DIAGNOSIS CODE/DESCRIPTION:  Rapid heart rate [R00 0]  Paroxysmal atrial fibrillation ([facility] [address] ) [I48 0]  Elevated troponin [R77 8]  DISCHARGE DATE/TIME: No discharge date for patient encounter  IMPORTANT INFORMATION:  Please contact the [facility] directly with any questions or concerns regarding this request  Department voicemails are confidential     Send requests for admission clinical reviews, concurrent reviews, approvals, and administrative denials due to lack of clinical to fax [phone]

## 2022-05-03 NOTE — UTILIZATION REVIEW
Initial Clinical Review    Observation 5/2 1426 changed to inpatient 5/2 1707  Pt requiring continued stay for treatment of rapid A fib  Admission: Date/Time/Statement:   Admission Orders (From admission, onward)     Ordered        05/02/22 1707  Inpatient Admission  Once            05/02/22 1426  Place in Observation  Once                      Orders Placed This Encounter   Procedures    Inpatient Admission     Standing Status:   Standing     Number of Occurrences:   1     Order Specific Question:   Level of Care     Answer:   Med Surg [16]     Order Specific Question:   Estimated length of stay     Answer:   More than 2 Midnights     Order Specific Question:   Certification     Answer:   I certify that inpatient services are medically necessary for this patient for a duration of greater than two midnights  See H&P and MD Progress Notes for additional information about the patient's course of treatment  ED Arrival Information     Expected Arrival Acuity    - 5/2/2022 11:54 Urgent         Means of arrival Escorted by Service Admission type    Ambulance [facility] Hospitalist Urgent         Arrival complaint    High Heart Rate        Chief Complaint   Patient presents with    Rapid Heart Rate     nurse at dialysis noticed patients heart rate in 130's today  pt sent home and care taker called ambulance  pt denies pain or any complaints at this time  hx of afib        Initial Presentation: [de-identified] y o  male presented to the ED from home with c/o rapid A fib after he completed dialysis treatment  His metoprolol was discontinued recently due to low blood pressure and dizziness  Patient has been on midodrine for chronic low blood pressure  PMH: chronic kidney disease, BPH, dialysis M-W-F, GERD, emergent aortic dissection repair,  HTN, MI  PE: Tachycardia, irregular heart rhythm and murmur noted   Plan: Observation for atrial fibrillation with rapid ventricular response and hypotension: give albumin and Midodrine for BP then will try to give beta blocker, continue Eliquis, hold Bumex, consult Cardiology and Nephrology  Cardiology consult: Paroxysmal atrial fibrillation with RVR  Start metoprolol 25 mg bid (hold for SBP < 95) and assess response, give albumin IV and then restart midodrine at lower dose of 2 5 mg tid, hold Bumex until tomorrow, replete potassium (3 2 on admit) recheck labs, telemetry  5/2 observation changed to inpatient  Date: 5/3   Day 2:     5/3:    Internal Medicine: Patient hypotensive this morning in the 80s but asymptomatic  Heart rates controlled in the 60s to 70s  Heart rhythm irregular  Plan to increase midodrine to 5 mg tid, continue lopressor bid  May need to start amio if persistently hypotensive  Cardiology: Heart rate irregular, murmur noted  Continue metoprolol tartrate 25 mg q12h (hold for SBP <95)  BP- hypotensive at times  Increase midodrine to 5 mg TID  If BP still remains an issue will plan to DC metoprolol an utilize amiodarone as alternative option  Nephrology consult: Hgb mildly below goal-continue epogen and venofer, Plan for HD tomorrow  Monitor BP with increased metoprolol and midodrine       ED Triage Vitals   Temperature Pulse Respirations Blood Pressure SpO2   05/02/22 1206 05/02/22 1158 05/02/22 1158 05/02/22 1158 05/02/22 1158   98 5 °F (36 9 °C) (!) 127 16 106/68 97 %      Temp Source Heart Rate Source Patient Position - Orthostatic VS BP Location FiO2 (%)   05/02/22 1206 05/02/22 1158 05/02/22 1158 05/02/22 1158 --   Oral Monitor Lying Right arm       Pain Score       05/02/22 1758       No Pain          Wt Readings from Last 1 Encounters:   05/02/22 80 5 kg (177 lb 7 5 oz)     Additional Vital Signs:   Date/Time Temp Pulse Resp BP MAP (mmHg) SpO2 O2 Device   05/03/22 0849 97 7 °F (36 5 °C) 67 18 80/66 Abnormal  -- 97 % None (Room air)   05/02/22 2227 98 °F (36 7 °C) 66 16 98/52 -- 98 % None (Room air)   05/02/22 1758 98 2 °F (36 8 °C) 67 16 96/53 69 98 % None (Room air)   05/02/22 1715 -- 66 16 112/55 78 97 % None (Room air)   05/02/22 1630 -- 118 Abnormal  16 117/59 77 97 % None (Room air)   05/02/22 1623 -- 121 Abnormal  -- 104/55  -- -- --   05/02/22 1619 -- 122 Abnormal  16 108/60 78 98 % None (Room air)   05/02/22 1536 -- 123 Abnormal  16 93/56 69 97 % None (Room air)   05/02/22 1432 98 2 °F (36 8 °C) 69 16 102/56 -- 95 % --   05/02/22 1206 98 5 °F (36 9 °C) -- -- -- -- -- --   05/02/22 1200 -- 126 Abnormal  -- 106/68 81 99 % --       Pertinent Labs/Diagnostic Test Results:   XR chest 1 view portable   ED Interpretation by Shad Santos DO (05/02 1259)   Prominent aortic knob, similar to previous study  Previous sternotomy  Reverse right shoulder arthroplasty  Final Result by Mariama Oliveira DO (05/02 1415)      No acute cardiopulmonary disease  Workstation performed: [id number]         5/2 EKG:  Atrial fibrillation  Nonspecific T wave abnormality  Abnormal ECG  When compared with ECG of 18-APR-2022 12:34,  Vent   rate has increased BY  47 BPM      Results from last 7 days   Lab Units 05/03/22  0524 05/02/22  1200   WBC Thousand/uL 5 17 5 03   HEMOGLOBIN g/dL 9 0* 9 8*   HEMATOCRIT % 28 0* 30 2*   PLATELETS Thousands/uL 91* 97*   NEUTROS ABS Thousands/µL 2 80 3 02         Results from last 7 days   Lab Units 05/03/22  0524 05/02/22  1200   SODIUM mmol/L 138 137   POTASSIUM mmol/L 4 1 3 2*   CHLORIDE mmol/L 101 101   CO2 mmol/L 29 31   ANION GAP mmol/L 8 5   BUN mg/dL 30* 20   CREATININE mg/dL 5 22* 3 67*   EGFR ml/min/1 73sq m 9 14   CALCIUM mg/dL 8 5 7 9*   MAGNESIUM mg/dL  --  2 0     Results from last 7 days   Lab Units 05/02/22  1200   AST U/L 50*   ALT U/L 34   ALK PHOS U/L 126*   TOTAL PROTEIN g/dL 7 4   ALBUMIN g/dL 2 2*   TOTAL BILIRUBIN mg/dL 0 80         Results from last 7 days   Lab Units 05/03/22  0524 05/02/22  1200   GLUCOSE RANDOM mg/dL 78 170*         Results from last 7 days Lab Units 05/02/22  1623 05/02/22  1432 05/02/22  1200   HS TNI 0HR ng/L  --   --  54*   HS TNI 2HR ng/L  --  53*  --    HSTNI D2 ng/L  --  -1  --    HS TNI 4HR ng/L 51*  --   --    HSTNI D4 ng/L -3  --   --          Results from last 7 days   Lab Units 05/02/22  1200   PROTIME seconds 18 0*   INR  1 50*     Results from last 7 days   Lab Units 05/03/22  0524 05/02/22  1200   TSH 3RD GENERATON uIU/mL 2 452 2 168         ED Treatment:   Medication Administration from 05/02/2022 1154 to 05/02/2022 1754       Date/Time Order Dose Route Action     05/02/2022 1439 potassium chloride (K-DUR,KLOR-CON) CR tablet 20 mEq 20 mEq Oral Given     05/02/2022 1623 metoprolol tartrate (LOPRESSOR) tablet 25 mg 25 mg Oral Given     05/02/2022 1612 albumin human (FLEXBUMIN) 25 % injection 12 5 g 0 g Intravenous Stopped     05/02/2022 1558 albumin human (FLEXBUMIN) 25 % injection 12 5 g 12 5 g Intravenous New Bag     05/02/2022 1623 midodrine (PROAMATINE) tablet 2 5 mg 2 5 mg Oral Given        Past Medical History:   Diagnosis Date    Acute renal failure superimposed on stage 4 chronic kidney disease ([place] [address] ) 10/4/2020    Arthritis     BPH without urinary obstruction     CKD (chronic kidney disease), stage III (HCC)     baseline 1 6-1 7    Dialysis patient ([place] [address] )     GERD (gastroesophageal reflux disease)     Hyperlipidemia     Hypertension     MI (myocardial infarction) ([place] [address] )      Present on Admission:   ESRD (end stage renal disease) ([place] [address] )   Hepatic cirrhosis ([place] [address] )      Admitting Diagnosis: Rapid heart rate [R00 0]  Paroxysmal atrial fibrillation (HCC) [I48 0]  Elevated troponin [R77 8]  Age/Sex: [de-identified] y o  male  Admission Orders:  Scheduled Medications:  apixaban, 2 5 mg, Oral, BID  atorvastatin, 40 mg, Oral, Daily With Dinner  b complex-vitamin C-folic acid, 1 capsule, Oral, Daily With Dinner  gabapentin, 100 mg, Oral, HS  melatonin, 3 mg, Oral, HS  metoprolol tartrate, 25 mg, Oral, Q12H FILIBERTO  midodrine, 5 mg, Oral, TID AC  pantoprazole, 40 mg, Oral, Early Morning      Continuous IV Infusions:     PRN Meds:  acetaminophen, 650 mg, Oral, Q6H PRN  diphenhydrAMINE, 25 mg, Oral, Q6H PRN        IP CONSULT TO CARDIOLOGY  IP CONSULT TO NEPHROLOGY    Network Utilization Review Department  ATTENTION: Please call with any questions or concerns to [phone] and carefully listen to the prompts so that you are directed to the right person  All voicemails are confidential   Raulito Dockery all requests for admission clinical reviews, approved or denied determinations and any other requests to dedicated fax number below belonging to the campus where the patient is receiving treatment   List of dedicated fax numbers for the Facilities:  [address] DENIALS (Administrative/Medical Necessity) [phone]   [address] (Maternity/NICU/Pediatrics) [phone]   [address]  [address] [facility] [place] [address] [address] [place] [address] [address] Syl[address] [place] [address] [place] [address] [phone]

## 2022-05-03 NOTE — ASSESSMENT & PLAN NOTE
· Found in rapid Afib after completing HD Monday  Metoprolol was recently removed outpt due to issues with low BP and dizziness  · HR is now 60-70s  · BP 80 systolic this AM  Pt does have chronically low BP and BP gets lower after HD treatment  · Spoke with Cards  Will try to give BB after albumin and Midodrine for BP  · Increase midodrine to 5 mg TID  · Continue lopressor 25 mg BID  · May need to start amio if persistently hypotensive  · Continue Eliquis

## 2022-05-03 NOTE — PLAN OF CARE
Problem: DISCHARGE PLANNING  Goal: Discharge to home or other facility with appropriate resources  Description: INTERVENTIONS:  - Identify barriers to discharge w/patient and caregiver  - Arrange for needed discharge resources and transportation as appropriate  - Identify discharge learning needs (meds, wound care, etc )  - Arrange for interpretive services to assist at discharge as needed  - Refer to Case Management Department for coordinating discharge planning if the patient needs post-hospital services based on physician/advanced practitioner order or complex needs related to functional status, cognitive ability, or social support system  Outcome: Progressing     Problem: Knowledge Deficit  Goal: Patient/family/caregiver demonstrates understanding of disease process, treatment plan, medications, and discharge instructions  Description: Complete learning assessment and assess knowledge base    Interventions:  - Provide teaching at level of understanding  - Provide teaching via preferred learning  Outcome: Progressing     Problem: CARDIOVASCULAR - ADULT  Goal: Maintains optimal cardiac output and hemodynamic stability  Description: INTERVENTIONS:  - Monitor I/O, vital signs and rhythm  - Monitor for S/S and trends of decreased cardiac output  - Administer and titrate ordered vasoactive medications to optimize hemodynamic stability  - Assess quality of pulses, skin color and temperature  - Assess for signs of decreased coronary artery perfusion  - Instruct patient to report change in severity of symptoms  Outcome: Progressing  Goal: Absence of cardiac dysrhythmias or at baseline rhythm  Description: INTERVENTIONS:  - Continuous cardiac monitoring, vital signs, obtain 12 lead EKG if ordered  - Administer antiarrhythmic and heart rate control medications as ordered  - Monitor electrolytes and administer replacement therapy as ordered  Outcome: Progressing

## 2022-05-03 NOTE — PROGRESS NOTES
[facility]  Progress Note - Porfirio SEBASTIAN Core 1940, [de-identified] y o  male MRN: [id number]  Unit/Bed#: S -01 Encounter: [id number]  Primary Care Provider: EL Teran   Date and time admitted to hospital: 5/2/2022 11:55 AM    * Atrial fibrillation with rapid ventricular response ([address] )  Assessment & Plan  · Found in rapid Afib after completing HD Monday  Metoprolol was recently removed outpt due to issues with low BP and dizziness  · HR is now 60-70s  · BP 80 systolic this AM  Pt does have chronically low BP and BP gets lower after HD treatment  · Spoke with Cards  Will try to give BB after albumin and Midodrine for BP  · Increase midodrine to 5 mg TID  · Continue lopressor 25 mg BID  · May need to start amio if persistently hypotensive  · Continue Eliquis  Status post aortic dissection repair  Assessment & Plan  · S/p emergent ascending aorta replacement and hemiarch reconstruction w/AV resuspension in Dec 9884 complicated by contrast nephropathy/ATN    Hypotension  Assessment & Plan  · BP chronically on low side  · Today 80 systolic prior to midodrine  · Will change midodrine to 5 mg tid for now  · Continue BB with hold parameters, may need amio if still hypotensive  · Hold Bumex  · Nephro eval    ESRD (end stage renal disease) [facility])  Assessment & Plan  Lab Results   Component Value Date    EGFR 9 05/03/2022    EGFR 14 05/02/2022    EGFR 9 04/18/2022    CREATININE 5 22 (H) 05/03/2022    CREATININE 3 67 (H) 05/02/2022    CREATININE 5 06 (H) 04/18/2022     · HD M-W-F  · Nephro consult for HD tomorrow    Hypokalemia-resolved as of 5/3/2022  Assessment & Plan  · 3 2 on admit  · Monitor, keep > 4 0      VTE Pharmacologic Prophylaxis: VTE Score: 4 Moderate Risk (Score 3-4) - Pharmacological DVT Prophylaxis Ordered: heparin  Patient Centered Rounds: I performed bedside rounds with nursing staff today    Discussions with Specialists or Other Care Team Provider: CM, nursing Education and Discussions with Family / Patient: Patient declined call to   Time Spent for Care: 30 minutes  More than 50% of total time spent on counseling and coordination of care as described above  Current Length of Stay: 1 day(s)  Current Patient Status: Inpatient   Certification Statement: The patient will continue to require additional inpatient hospital stay due to hypotension complicating A fib RVR  Discharge Plan: Anticipate discharge in 24-48 hrs to home  Code Status: Level 1 - Full Code    Subjective:   Patient hypotensive this morning in the 80s but asymptomatic  Heart rates controlled in the 60s to 70s  Patient denies any palpitations or chest pain  For hemodialysis tomorrow  Objective:     Vitals:   Temp (24hrs), Av 1 °F (36 7 °C), Min:97 7 °F (36 5 °C), Max:98 5 °F (36 9 °C)    Temp:  [97 7 °F (36 5 °C)-98 5 °F (36 9 °C)] 97 7 °F (36 5 °C)  HR:  [] 67  Resp:  [16-18] 18  BP: ()/(52-68) 80/66  SpO2:  [95 %-99 %] 97 %  Body mass index is 27 8 kg/m²  Input and Output Summary (last 24 hours): Intake/Output Summary (Last 24 hours) at 5/3/2022 1028  Last data filed at 2022 1612  Gross per 24 hour   Intake 50 ml   Output --   Net 50 ml       Physical Exam:   Physical Exam  Vitals and nursing note reviewed  Constitutional:       General: He is not in acute distress  Appearance: Normal appearance  HENT:      Head: Normocephalic  Mouth/Throat:      Mouth: Mucous membranes are moist    Eyes:      Pupils: Pupils are equal, round, and reactive to light  Cardiovascular:      Rate and Rhythm: Normal rate  Rhythm irregular  Heart sounds: No murmur heard  Pulmonary:      Effort: Pulmonary effort is normal  No respiratory distress  Breath sounds: Normal breath sounds  No wheezing, rhonchi or rales  Abdominal:      General: Bowel sounds are normal  There is no distension  Palpations: Abdomen is soft        Tenderness: There is no abdominal tenderness  There is no guarding  Musculoskeletal:         General: No deformity  Cervical back: Normal range of motion  Right lower leg: No edema  Left lower leg: No edema  Skin:     Capillary Refill: Capillary refill takes less than 2 seconds  Neurological:      General: No focal deficit present  Mental Status: He is alert and oriented to person, place, and time  Mental status is at baseline  Additional Data:     Labs:  Results from last 7 days   Lab Units 05/03/22  0524   WBC Thousand/uL 5 17   HEMOGLOBIN g/dL 9 0*   HEMATOCRIT % 28 0*   PLATELETS Thousands/uL 91*   NEUTROS PCT % 53   LYMPHS PCT % 23   MONOS PCT % 19*   EOS PCT % 4     Results from last 7 days   Lab Units 05/03/22  0524 05/02/22  1200 05/02/22  1200   SODIUM mmol/L 138   < > 137   POTASSIUM mmol/L 4 1   < > 3 2*   CHLORIDE mmol/L 101   < > 101   CO2 mmol/L 29   < > 31   BUN mg/dL 30*   < > 20   CREATININE mg/dL 5 22*   < > 3 67*   ANION GAP mmol/L 8   < > 5   CALCIUM mg/dL 8 5   < > 7 9*   ALBUMIN g/dL  --   --  2 2*   TOTAL BILIRUBIN mg/dL  --   --  0 80   ALK PHOS U/L  --   --  126*   ALT U/L  --   --  34   AST U/L  --   --  50*   GLUCOSE RANDOM mg/dL 78   < > 170*    < > = values in this interval not displayed       Results from last 7 days   Lab Units 05/02/22  1200   INR  1 50*                   Lines/Drains:  Invasive Devices  Report    Central Venous Catheter Line            CVC Central Lines 02/15/21 Double 441 days          Peripheral Intravenous Line            Peripheral IV 05/03/22 Distal;Right;Ventral (anterior) Forearm <1 day          Line            Hemodialysis AV Fistula 03/26/21 Left Forearm 403 days          Hemodialysis Catheter            HD Permanent Double Catheter 441 days                Central Line:  Goal for removal: N/A - Chronic PICC         Telemetry:  Telemetry Orders (From admission, onward)             48 Hour Telemetry Monitoring  Continuous x 48 hours References:    Telemetry Guidelines   Question:  Reason for 48 Hour Telemetry  Answer:  Acute MI, chest pain - R/O MI, or unstable angina                 Telemetry Reviewed: Atrial fibrillation  HR averaging 65  Indication for Continued Telemetry Use: Arrthymias requiring medical therapy             Imaging: No pertinent imaging reviewed  Recent Cultures (last 7 days):         Last 24 Hours Medication List:   Current Facility-Administered Medications   Medication Dose Route Frequency Provider Last Rate    acetaminophen  650 mg Oral Q6H PRN Sanjay Lo MD      apixaban  2 5 mg Oral BID Sanjay Lo MD      atorvastatin  40 mg Oral Daily With Hu Garcia MD      b complex-vitamin C-folic acid  1 capsule Oral Daily With Dinner Sanjay Lo MD      diphenhydrAMINE  25 mg Oral Q6H PRN Sanjay Lo MD      gabapentin  100 mg Oral HS Sanjay Lo MD      melatonin  3 mg Oral HS Sanjay Lo MD      metoprolol tartrate  25 mg Oral Q12H [address] Shira EL Maria      midodrine  5 mg Oral TID AC Nasrin Mazariegos PA-C      pantoprazole  40 mg Oral Early Morning Sanjay Lo MD          Today, Patient Was Seen By: Jackeline Corona PA-C    **Please Note: This note may have been constructed using a voice recognition system  **

## 2022-05-03 NOTE — ASSESSMENT & PLAN NOTE
Lab Results   Component Value Date    EGFR 9 05/03/2022    EGFR 14 05/02/2022    EGFR 9 04/18/2022    CREATININE 5 22 (H) 05/03/2022    CREATININE 3 67 (H) 05/02/2022    CREATININE 5 06 (H) 04/18/2022     · HD M-W-F  · Nephro consult for HD tomorrow

## 2022-05-04 NOTE — PLAN OF CARE
Post-Dialysis RN Treatment Note    Blood Pressure:  Pre 114/59mm/Hg  Post 97/53 mmHg   EDW 81 5 kg    Weight:  Pre 83 kg   Post 81 5 kg   Mode of weight measurement: Bed Scale   Volume Removed  1500 ml    Treatment duration 180 minutes    NS given  No    Treatment shortened?  Yes, describe: 3 hrs/ per Dr Lawson Braga at patients request   Medications given during Rx Hectorol, Epogen   Estimated Kt/V  Not Applicable   Access type: AV graft   Access Issues: No    Report called to primary nurse   Yes / Carrillo Camp RN      1500 ml as tolerated, 3K bath, 3 hrs / today only at patients request  Berhane Arceo with Dr Lawson Braga  Problem: METABOLIC, FLUID AND ELECTROLYTES - ADULT  Goal: Electrolytes maintained within normal limits  Description: INTERVENTIONS:  - Monitor labs and assess patient for signs and symptoms of electrolyte imbalances  - Administer electrolyte replacement as ordered  - Monitor response to electrolyte replacements, including repeat lab results as appropriate  - Instruct patient on fluid and nutrition as appropriate  Outcome: Progressing  Goal: Fluid balance maintained  Description: INTERVENTIONS:  - Monitor labs   - Monitor I/O and WT  - Instruct patient on fluid and nutrition as appropriate  - Assess for signs & symptoms of volume excess or deficit  Outcome: Progressing

## 2022-05-04 NOTE — ASSESSMENT & PLAN NOTE
Lab Results   Component Value Date    EGFR 9 05/03/2022    EGFR 14 05/02/2022    EGFR 9 04/18/2022    CREATININE 5 22 (H) 05/03/2022    CREATININE 3 67 (H) 05/02/2022    CREATININE 5 06 (H) 04/18/2022     · HD M-W-F  · Nephro consult for HD today

## 2022-05-04 NOTE — ASSESSMENT & PLAN NOTE
· S/p emergent ascending aorta replacement and hemiarch reconstruction w/AV resuspension in Dec 1159 complicated by contrast nephropathy/ATN

## 2022-05-04 NOTE — DISCHARGE INSTR - AVS FIRST PAGE
Dear Maria Antonia Castillo,     It was our pleasure to care for you here at [place], [facility]  It is our hope that we were always able to exceed the expected standards for your care during your stay  You were hospitalized due to a fib RVR  You were cared for on the 3rd floor by Jacobo Gamboa PA-C under the service of Khloe Oconnor MD with the [facility]ist [facility] who covers for your primary care physician (PCP), Mahsa Roe, while you were hospitalized  If you have any questions or concerns related to this hospitalization, you may contact us at [phone]  For follow up as well as any medication refills, we recommend that you follow up with your primary care physician  A registered nurse will reach out to you by phone within a few days after your discharge to answer any additional questions that you may have after going home  However, at this time we provide for you here, the most important instructions / recommendations at discharge:     Notable Medication Adjustments -   Please take midodrine 5 mg 3 times per day  Please take Toprol-XL 25 mg twice per day  Please stop taking Bumex  Testing Required after Discharge -   None  Important follow up information -   We are arranging cardiology follow-up for you  They will call you to set up an appointment  Other Instructions -   Please go to dialysis again on Friday  Please take all of your medications as directed return to the ER if you notice any chest pain or fluttering in your chest   Please review this entire after visit summary as additional general instructions including medication list, appointments, activity, diet, any pertinent wound care, and other additional recommendations from your care team that may be provided for you        Sincerely,     Jacobo Gamboa PA-C

## 2022-05-04 NOTE — DISCHARGE SUMMARY
[facility] A Core 1940, [de-identified] y o  male MRN: [id number]  Unit/Bed#: S -01 Encounter: [id number]  Primary Care Provider: EL Bullard   Date and time admitted to hospital: 5/2/2022 11:55 AM    * Atrial fibrillation with rapid ventricular response ([address] )  Assessment & Plan  · Found in rapid Afib after completing HD Monday  Metoprolol was recently removed outpt due to issues with low BP and dizziness  · HR is now 60-70s  ·  systolic this AM  Pt does have chronically low BP and BP gets lower after HD treatment  · Increased midodrine to 5 mg TID  · Continue lopressor 25 mg BID  · Continue Eliquis  Status post aortic dissection repair  Assessment & Plan  · S/p emergent ascending aorta replacement and hemiarch reconstruction w/AV resuspension in Dec 4213 complicated by contrast nephropathy/ATN    Hypotension  Assessment & Plan  · BP chronically on low side  · Today 110 with increased midodrine  · Continue midodrine to 5 mg tid for now  · Continue BB  · D/c Bumex  · Nephro eval appreciated  · If he tolerates dialysis without tachycardia or hypotension he can be safely discharged      ESRD (end stage renal disease) [facility])  Assessment & Plan  Lab Results   Component Value Date    EGFR 9 05/03/2022    EGFR 14 05/02/2022    EGFR 9 04/18/2022    CREATININE 5 22 (H) 05/03/2022    CREATININE 3 67 (H) 05/02/2022    CREATININE 5 06 (H) 04/18/2022     · HD M-W-F  · Nephro consult for HD today    Medical Problems             Resolved Problems  Date Reviewed: 5/3/2022          Resolved    Hypokalemia 5/3/2022     Resolved by  Nasrin Mazariegos PA-C              Discharging Physician / Practitioner: Jackeline Corona PA-C  PCP: EL Bullard  Admission Date:   Admission Orders (From admission, onward)     Ordered        05/02/22 1707  Inpatient Admission  Once            05/02/22 1426  Place in Observation  Once                      Discharge Date: 05/04/22    Consultations During Hospital Stay:  · Cardiology  · Nephrology    Procedures Performed:   · None     Significant Findings / Test Results:   · As above    Incidental Findings:   · None      Test Results Pending at Discharge (will require follow up): · None      Outpatient Tests Requested:  · None     Complications:  none    Reason for Admission: a fib RVR    Hospital Course:   Yeimi Gresham is a [de-identified] y o  male patient who originally presented to the hospital on 5/2/2022 due to a fib RVR  Patient was recently taken off of his Toprol XL in the outpatient setting due to issues with hypotension  At dialysis on Monday 5/to eat went into AFib RVR and presented to the ER  While in the ER he was seen by Cardiology and Nephrology  We recommended reintroducing midodrine which is also discontinued in the outpatient setting at 5 mg t i d  To allow for beta blockade  His metoprolol was resumed at 25 mg b i d  And his heart rates were stable throughout his hospitalization  Blood pressure was stable upon day of discharge  He will be receiving hemodialysis today, and will have to go to dialysis again on Friday  Outpatient follow-up will be arranged  Please see above list of diagnoses and related plan for additional information  Condition at Discharge: stable    Discharge Day Visit / Exam:   Subjective:  No overnight events reported by nursing  Patient is still in rate controlled AFib  Blood pressure is good this morning  For HD today  He is aware that if he tolerates this he can go home with outpatient follow-up with Cardiology  Vitals: Blood Pressure: 109/65 (05/04/22 0800)  Pulse: 81 (05/04/22 0800)  Temperature: 97 6 °F (36 4 °C) (05/04/22 0800)  Temp Source: Oral (05/04/22 0800)  Respirations: 18 (05/04/22 0800)  Height: 5' 7" (170 2 cm) (05/02/22 1758)  Weight - Scale: 80 5 kg (177 lb 7 5 oz) (05/02/22 1758)  SpO2: 94 % (05/04/22 0800)  Exam:   Physical Exam  Vitals and nursing note reviewed  Constitutional:       General: He is not in acute distress  Appearance: Normal appearance  HENT:      Head: Normocephalic  Mouth/Throat:      Mouth: Mucous membranes are moist    Eyes:      Pupils: Pupils are equal, round, and reactive to light  Cardiovascular:      Rate and Rhythm: Normal rate and regular rhythm  Heart sounds: No murmur heard  Pulmonary:      Effort: Pulmonary effort is normal  No respiratory distress  Breath sounds: Normal breath sounds  No wheezing, rhonchi or rales  Abdominal:      General: Bowel sounds are normal  There is no distension  Palpations: Abdomen is soft  Tenderness: There is no abdominal tenderness  There is no guarding  Musculoskeletal:         General: No deformity  Cervical back: Normal range of motion  Right lower leg: No edema  Left lower leg: No edema  Skin:     Capillary Refill: Capillary refill takes less than 2 seconds  Neurological:      General: No focal deficit present  Mental Status: He is alert and oriented to person, place, and time  Mental status is at baseline  Discussion with Family: Patient declined call to   Discharge instructions/Information to patient and family:   See after visit summary for information provided to patient and family  Provisions for Follow-Up Care:  See after visit summary for information related to follow-up care and any pertinent home health orders  Disposition:   Home    Planned Readmission: no     Discharge Statement:  I spent 45 minutes discharging the patient  This time was spent on the day of discharge  I had direct contact with the patient on the day of discharge  Greater than 50% of the total time was spent examining patient, answering all patient questions, arranging and discussing plan of care with patient as well as directly providing post-discharge instructions  Additional time then spent on discharge activities      Discharge Medications:  See after visit summary for reconciled discharge medications provided to patient and/or family        **Please Note: This note may have been constructed using a voice recognition system**

## 2022-05-04 NOTE — PROGRESS NOTES
General Cardiology   Progress Note -  [employer]   [facility] [de-identified] y o  male MRN: [id number]    Unit/Bed#: S -01 Encounter: [id number]    Assessment  1  Paroxysmal atrial fibrillation with RVR  -Mildly symptomatic w/c/o intermittent palpitations   -24 hour telemetry review - afib, rates currently in the 60s to 80s  -Had been taken off of Toprol XL approximately 2-3 months ago by his dialysis center due to low BP per family report   -Started on metoprolol tartrate 25 mg q12h this admission    -On apixaban 2 5 mg b i d  (therapeutic for age/renal function)  2  Chronic diastolic CHF  -Appears compensated  -TTE 10/2020 - LVEF 60%, no regional wall motion abnormalities, grade 2 DD, RV normal size/systolic function normal, LA mildly dilated, RA moderately dilated, mild AS/mild AI, severe TR and mild MR   -Volume management - intermittent HD sessions (M,W,F)  3  Hx of Type A aortic 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 w/AV resuspension 12/2019   4  Chronic hypotension  -Average /63, last recorded 109/65, HR 81    -On midodrine 5 mg t i d   5  Dyslipidemia  -Lipid profile 9/2021; cholesterol 136, triglycerides 71, HDL 51, and LDL 71    -On atorvastatin 40 mg daily  6  ESRD  -Nephrology following  -Receives intermittent HD sessions (M,W,F) as an outpatient     Plan  -Pt doing well this a m  Without any significant cardiac complaints   -Heart rates remain well controlled in atrial fibrillation and his BP has normalized with up titration of midodrine to 5 mg t i d   Would continue metoprolol tartrate 25 mg q 12 hours and midodrine at current dose  -Continue Eliquis 2 5 mg b i d for systemic anticoagulation  -HD session today per Nephrology  -Replete electrolytes to maintain K+level at 4, and mag level at 2    -Can DC telemetry monitoring following dialysis if no acute issues  -Will arrange follow-up with cardiology on discharge      Subjective  Review of Systems Constitutional: Negative for chills, fever and malaise/fatigue  Eyes: Negative for visual disturbance  Cardiovascular: Negative for chest pain, dyspnea on exertion, leg swelling, orthopnea and palpitations  Respiratory: Negative for cough and shortness of breath  Gastrointestinal: Negative for abdominal pain  Neurological: Negative for dizziness, headaches and light-headedness  Objective:   Physical Exam  Vitals and nursing note reviewed  Constitutional:       General: He is not in acute distress  Appearance: He is not diaphoretic  HENT:      Head: Normocephalic and atraumatic  Mouth/Throat:      Mouth: Mucous membranes are moist    Eyes:      General: No scleral icterus  Cardiovascular:      Rate and Rhythm: Normal rate  Rhythm irregular  Pulses: Normal pulses  Heart sounds: Murmur heard  Comments: 3/6 LULU  Pulmonary:      Effort: Pulmonary effort is normal       Breath sounds: Normal breath sounds  No wheezing or rales  Abdominal:      Palpations: Abdomen is soft  Tenderness: There is no abdominal tenderness  Musculoskeletal:      Cervical back: Neck supple  Right lower leg: No edema  Left lower leg: No edema  Skin:     General: Skin is warm and dry  Capillary Refill: Capillary refill takes less than 2 seconds  Neurological:      General: No focal deficit present  Mental Status: He is alert and oriented to person, place, and time  Psychiatric:         Mood and Affect: Mood normal          Vitals: Blood pressure 109/65, pulse 81, temperature 97 6 °F (36 4 °C), temperature source Oral, resp  rate 18, height 5' 7" (1 702 m), weight 80 5 kg (177 lb 7 5 oz), SpO2 94 %  ,     Body mass index is 27 8 kg/m²  ,   Systolic (12GTF), DCN:477 , Min:92 , RWH:666     Diastolic (45USO), MAHAD:96, Min:57, Max:66    No intake or output data in the 24 hours ending 05/04/22 0932  Weight (last 2 days)     Date/Time Weight    05/02/22 1758 80 5 (177 47) 05/02/22 1158 80 7 (178)          LABORATORY RESULTS      CBC with diff:   Results from last 7 days   Lab Units 05/03/22  0524 05/02/22  1200   WBC Thousand/uL 5 17 5 03   HEMOGLOBIN g/dL 9 0* 9 8*   HEMATOCRIT % 28 0* 30 2*   MCV fL 101* 102*   PLATELETS Thousands/uL 91* 97*   MCH pg 32 6 33 2   MCHC g/dL 32 1 32 5   RDW % 16 2* 15 9*   MPV fL 11 2 11 0   NRBC AUTO /100 WBCs 0 0       CMP:  Results from last 7 days   Lab Units 05/03/22  0524 05/02/22  1200   POTASSIUM mmol/L 4 1 3 2*   CHLORIDE mmol/L 101 101   CO2 mmol/L 29 31   BUN mg/dL 30* 20   CREATININE mg/dL 5 22* 3 67*   CALCIUM mg/dL 8 5 7 9*   AST U/L  --  50*   ALT U/L  --  34   ALK PHOS U/L  --  126*   EGFR ml/min/1 73sq m 9 14       BMP:  Results from last 7 days   Lab Units 05/03/22  0524 05/02/22  1200   POTASSIUM mmol/L 4 1 3 2*   CHLORIDE mmol/L 101 101   CO2 mmol/L 29 31   BUN mg/dL 30* 20   CREATININE mg/dL 5 22* 3 67*   CALCIUM mg/dL 8 5 7 9*       Lab Results   Component Value Date    NTBNP 4,828 (H) 01/26/2021    NTBNP 2,792 (H) 11/02/2020    NTBNP 4,285 (H) 10/04/2020        Results from last 7 days   Lab Units 05/02/22  1200   MAGNESIUM mg/dL 2 0             Results from last 7 days   Lab Units 05/03/22  0524   TSH 3RD GENERATON uIU/mL 2 452       Results from last 7 days   Lab Units 05/02/22  1200   INR  1 50*       Lipid Profile:   No results found for: CHOL  Lab Results   Component Value Date    HDL 51 09/20/2021    HDL 37 (L) 04/30/2021    HDL 42 01/26/2021     Lab Results   Component Value Date    LDLCALC 71 09/20/2021    LDLCALC 48 04/30/2021    LDLCALC 32 01/26/2021     Lab Results   Component Value Date    TRIG 71 09/20/2021    TRIG 90 04/30/2021    TRIG 69 01/26/2021       Cardiac testing:   Results for orders placed during the hospital encounter of 10/04/20    Echo complete with contrast if indicated    Narrative  [facility] [address] [facility]  [phone]    Transthoracic Echocardiogram  2D, M-mode, Doppler, and Color Doppler    Study date:  06-Oct-2020    Patient: Troy Norwood  MR number: [id number]  Account number: [de-identified]  : 1940  Age: 68 years  Gender: Male  Status: Inpatient  Location: Bedside  Height: 68 in  Weight: 189 6 lb  BP: 150/ 79 mmHg    Indications: Heart failure  Diagnoses: I50 9 - Heart failure, unspecified    Sonographer:  Andrade Gomez RDCS  Referring Physician:  Beto Burger PA-C  Group:  [facility]  Interpreting Physician:  Padma Ayers MD    SUMMARY    LEFT VENTRICLE:  Systolic function was normal by visual assessment  Ejection fraction was estimated to be 60 %  There were no regional wall motion abnormalities  Wall thickness was moderately increased  Features were consistent with a pseudonormal left ventricular filling pattern, with concomitant abnormal relaxation and increased filling pressure (grade 2 diastolic dysfunction)  RIGHT VENTRICLE:  Systolic pressure was mildly to moderately increased  Estimated peak pressure was 47 mmHg  LEFT ATRIUM:  The atrium was mildly dilated  RIGHT ATRIUM:  The atrium was moderately dilated  MITRAL VALVE:  There was mild annular calcification  There was mild regurgitation  AORTIC VALVE:  Transaortic velocity was increased due to valvular stenosis  There was mild stenosis  There was mild regurgitation  TRICUSPID VALVE:  There was severe regurgitation  PULMONIC VALVE:  There was mild regurgitation  IVC, HEPATIC VEINS:  The inferior vena cava was dilated  HISTORY: PRIOR HISTORY: Atrial fibrillation  Acute congestive heart failure  S/P Aortic repair  CKD4  PROCEDURE: The procedure was performed at the bedside  This was a routine study  The transthoracic approach was used  The study included complete 2D imaging, M-mode, complete spectral Doppler, and color Doppler  The heart rate was 96 bpm,  at the start of the study   Echocardiographic views were limited due to lung interference  This was a technically difficult study  LEFT VENTRICLE: Size was normal  Systolic function was normal by visual assessment  Ejection fraction was estimated to be 60 %  There were no regional wall motion abnormalities  Wall thickness was moderately increased  DOPPLER: The ratio  of early ventricular filling to atrial contraction velocities was within the normal range  Features were consistent with a pseudonormal left ventricular filling pattern, with concomitant abnormal relaxation and increased filling pressure  (grade 2 diastolic dysfunction)  RIGHT VENTRICLE: The size was normal  Systolic function was normal  DOPPLER: Systolic pressure was mildly to moderately increased  Estimated peak pressure was 47 mmHg  LEFT ATRIUM: The atrium was mildly dilated  RIGHT ATRIUM: The atrium was moderately dilated  MITRAL VALVE: There was mild annular calcification  Valve structure was normal  There was mild calcification  There was normal leaflet separation  DOPPLER: The transmitral velocity was within the normal range  There was no evidence for  stenosis  There was mild regurgitation  AORTIC VALVE: The valve was trileaflet  Leaflets exhibited normal thickness, mild to moderate calcification, and mildly reduced cuspal separation  DOPPLER: Transaortic velocity was increased due to valvular stenosis  There was mild  stenosis  There was mild regurgitation  TRICUSPID VALVE: The valve structure was normal  There was normal leaflet separation  DOPPLER: The transtricuspid velocity was within the normal range  There was no evidence for stenosis  There was severe regurgitation  PULMONIC VALVE: Leaflets exhibited normal thickness, no calcification, and normal cuspal separation  DOPPLER: The transpulmonic velocity was within the normal range  There was mild regurgitation  PERICARDIUM: There was no pericardial effusion  AORTA: The root exhibited normal size      SYSTEMIC VEINS: IVC: The inferior vena cava was dilated  SYSTEM MEASUREMENT TABLES    2D  %FS: 29 94 %  Ao Diam: 3 13 cm  EDV(Teich): 101 8 ml  EF(Teich): 57 13 %  ESV(Teich): 43 65 ml  IVSd: 1 41 cm  LA Area: 20 79 cm2  LA Diam: 4 5 cm  LVEDV MOD A4C: 62 85 ml  LVEF MOD A4C: 62 56 %  LVESV MOD A4C: 23 53 ml  LVIDd: 4 69 cm  LVIDs: 3 28 cm  LVLd A4C: 7 79 cm  LVLs A4C: 5 95 cm  LVOT Diam: 2 16 cm  LVPWd: 1 37 cm  RA Area: 31 08 cm2  RVIDd: 4 04 cm  SV MOD A4C: 39 32 ml  SV(Teich): 58 16 ml    CW  AV Env  Ti: 264 51 ms  AV MaxP 04 mmHg  AV VTI: 44 13 cm  AV Vmax: 2 4 m/s  AV Vmean: 1 66 m/s  AV meanP 33 mmHg  TR MaxP 22 mmHg  TR Vmax: 3 05 m/s    MM  TAPSE: 2 49 cm    PW  EDITH (VTI): 1 81 cm2  EDITH Vmax: 1 75 cm2  AVAI (VTI): 0 cm2/m2  AVAI Vmax: 0 cm2/m2  E' Sept: 0 08 m/s  E/E' Sept: 11 75  LVOT Env  Ti: 296 86 ms  LVOT VTI: 21 84 cm  LVOT Vmax: 1 14 m/s  LVOT Vmean: 0 74 m/s  LVOT maxP 24 mmHg  LVOT meanP 5 mmHg  LVSI Dopp: 40 02 ml/m2  LVSV Dopp: 80 04 ml  MV A Chet: 0 67 m/s  MV Dec Wagoner: 6 53 m/s2  MV DecT: 141 03 ms  MV E Chet: 0 92 m/s  MV E/A Ratio: 1 37  MV PHT: 40 9 ms  MVA By PHT: 5 38 cm2    [employer] Accredited Echocardiography Laboratory    Prepared and electronically signed by    Erasto Altamirano MD  Signed 06-Oct-2020 13:04:42    No results found for this or any previous visit  No results found for this or any previous visit  No valid procedures specified  No results found for this or any previous visit        Meds/Allergies   all current active meds have been reviewed and current meds:   Current Facility-Administered Medications   Medication Dose Route Frequency    acetaminophen (TYLENOL) tablet 650 mg  650 mg Oral Q6H PRN    apixaban (ELIQUIS) tablet 2 5 mg  2 5 mg Oral BID    atorvastatin (LIPITOR) tablet 40 mg  40 mg Oral Daily With Dinner    b complex-vitamin C-folic acid (NEPHROCAPS) capsule 1 capsule  1 capsule Oral Daily With Dinner    diphenhydrAMINE (BENADRYL) tablet 25 mg  25 mg Oral Q6H PRN    doxercalciferol (HECTOROL) capsule 2 mcg  2 mcg Oral After Dialysis    epoetin uday (EPOGEN,PROCRIT) injection 2,000 Units  2,000 Units Intravenous After Dialysis    gabapentin (NEURONTIN) capsule 100 mg  100 mg Oral HS    melatonin tablet 3 mg  3 mg Oral HS    metoprolol succinate (TOPROL-XL) 24 hr tablet 25 mg  25 mg Oral BID    midodrine (PROAMATINE) tablet 5 mg  5 mg Oral TID AC    pantoprazole (PROTONIX) EC tablet 40 mg  40 mg Oral Early Morning          EKG personally reviewed by EL Celestin    Assessment:  Principal Problem:    Atrial fibrillation with rapid ventricular response (HCC)  Active Problems:    ESRD (end stage renal disease) (HCC)    Hepatic cirrhosis (HCC)    Hypotension    Status post aortic dissection repair    Counseling / Coordination of Care  Total floor / unit time spent today 20 minutes  Greater than 50% of total time was spent with the patient and / or family counseling and / or coordination of care  ** Please Note: Dragon 360 Dictation voice to text software may have been used in the creation of this document   **

## 2022-05-04 NOTE — ASSESSMENT & PLAN NOTE
· BP chronically on low side  · Today 110 with increased midodrine  · Continue midodrine to 5 mg tid for now  · Continue BB  · D/c Bumex  · Nephro eval appreciated  · If he tolerates dialysis without tachycardia or hypotension he can be safely discharged

## 2022-05-04 NOTE — PROGRESS NOTES
NEPHROLOGY PROGRESS NOTE   Cornelia Sharpe [de-identified] y o  male MRN: [id number]  Unit/Bed#: S -01 Encounter: [id number]  Reason for Consult: ESRD    ASSESSMENT/PLAN:  1  ESRD on HD ([facility] @ [address]): next dialysis is today  - EDW 81 5kg (increased from 81kg on 5/2 and 79 5kg on 4/9  2  Access: left AVG  3  Hypotension: BP soft  - decrease dialysate temperature  - increased midodrine to 5mg TID  - now on metoprolol 25mg BID for atrial fibrillation   4  Anemia: hgb mildly below goal  - continue epogen 1800mg qHD  - continue venofer 50mg weekly  5  Secondary Hyperparathyroidism of renal origin: renal diet  - continue hectorol 2mcg qHD  6  Atrial fibrillation- cardiology on board  - started on metoprolol    Disposition:  Plan for discharge per cardiology  SUBJECTIVE:  Patient feeling well  Denies dizziness  Denies SOB  Understands plan for HD today prior to discharge      OBJECTIVE:  Current Weight: Weight - Scale: 80 5 kg (177 lb 7 5 oz)  Vitals:    05/03/22 1034 05/03/22 1621 05/03/22 2144 05/04/22 0800   BP: 92/66 111/64 106/57 109/65   BP Location: Right arm Right arm Right arm Right arm   Pulse:  76 72 81   Resp:  18 18 18   Temp:  97 7 °F (36 5 °C) 97 9 °F (36 6 °C) 97 6 °F (36 4 °C)   TempSrc:  Oral Oral Oral   SpO2:  95% 98% 94%   Weight:       Height:         No intake or output data in the 24 hours ending 05/04/22 0957  General: NAD  Skin: no rash  Eyes: anicteric  ENMT: mm moist  Neck: no masses  Respiratory: CTAB  Cardiac: RRR  Extremities: no edema  GI: soft nt nd  Neuro: alert awake  Psych: mood and affect appropriate    Medications:    Current Facility-Administered Medications:     acetaminophen (TYLENOL) tablet 650 mg, 650 mg, Oral, Q6H PRN, Dilan Hankins MD    [facility]) tablet 2 5 mg, 2 5 mg, Oral, BID, Dilan Hankins MD, 2 5 mg at 05/04/22 0814    atorvastatin (LIPITOR) tablet 40 mg, 40 mg, Oral, Daily With Vandana Bragg MD, 40 mg at 05/03/22 1731    b complex-vitamin C-folic acid (NEPHROCAPS) capsule 1 capsule, 1 capsule, Oral, Daily With Dinner, Berhane Graves MD, 1 capsule at 05/03/22 1731    diphenhydrAMINE (BENADRYL) tablet 25 mg, 25 mg, Oral, Q6H PRN, Berhane Graves MD    doxercalciferol (HECTOROL) capsule 2 mcg, 2 mcg, Oral, After Dialysis, TRINY Mccullough    epoetin uday (EPOGEN,PROCRIT) injection 2,000 Units, 2,000 Units, Intravenous, After Dialysis, Alexia Ann MD    gabapentin (NEURONTIN) capsule 100 mg, 100 mg, Oral, HS, Berhane Graves MD, 100 mg at 05/03/22 2146    melatonin tablet 3 mg, 3 mg, Oral, HS, Berhane Graves MD, 3 mg at 05/03/22 2146    metoprolol succinate (TOPROL-XL) 24 hr tablet 25 mg, 25 mg, Oral, BID, Jose Alfredo Joshi PA-C, 25 mg at 05/04/22 0843    midodrine (PROAMATINE) tablet 5 mg, 5 mg, Oral, TID AC, Jose Alfredo Joshi PA-C, 5 mg at 05/04/22 0548    pantoprazole (PROTONIX) EC tablet 40 mg, 40 mg, Oral, Early Morning, Berhane Graves MD, 40 mg at 05/04/22 0548    Laboratory Results:  Results from last 7 days   Lab Units 05/03/22  0524 05/02/22  1200   WBC Thousand/uL 5 17 5 03   HEMOGLOBIN g/dL 9 0* 9 8*   HEMATOCRIT % 28 0* 30 2*   PLATELETS Thousands/uL 91* 97*   POTASSIUM mmol/L 4 1 3 2*   CHLORIDE mmol/L 101 101   CO2 mmol/L 29 31   BUN mg/dL 30* 20   CREATININE mg/dL 5 22* 3 67*   CALCIUM mg/dL 8 5 7 9*   MAGNESIUM mg/dL  --  2 0     I have personally reviewed the blood work as stated above and in my note  I have personally reviewed cardiology note

## 2022-05-04 NOTE — PLAN OF CARE
Problem: DISCHARGE PLANNING  Goal: Discharge to home or other facility with appropriate resources  Description: INTERVENTIONS:  - Identify barriers to discharge w/patient and caregiver  - Arrange for needed discharge resources and transportation as appropriate  - Identify discharge learning needs (meds, wound care, etc )  - Arrange for interpretive services to assist at discharge as needed  - Refer to Case Management Department for coordinating discharge planning if the patient needs post-hospital services based on physician/advanced practitioner order or complex needs related to functional status, cognitive ability, or social support system  Outcome: Completed     Problem: Knowledge Deficit  Goal: Patient/family/caregiver demonstrates understanding of disease process, treatment plan, medications, and discharge instructions  Description: Complete learning assessment and assess knowledge base    Interventions:  - Provide teaching at level of understanding  - Provide teaching via preferred learning methods  Outcome: Completed     Problem: CARDIOVASCULAR - ADULT  Goal: Maintains optimal cardiac output and hemodynamic stability  Description: INTERVENTIONS:  - Monitor I/O, vital signs and rhythm  - Monitor for S/S and trends of decreased cardiac output  - Administer and titrate ordered vasoactive medications to optimize hemodynamic stability  - Assess quality of pulses, skin color and temperature  - Assess for signs of decreased coronary artery perfusion  - Instruct patient to report change in severity of symptoms  Outcome: Completed  Goal: Absence of cardiac dysrhythmias or at baseline rhythm  Description: INTERVENTIONS:  - Continuous cardiac monitoring, vital signs, obtain 12 lead EKG if ordered  - Administer antiarrhythmic and heart rate control medications as ordered  - Monitor electrolytes and administer replacement therapy as ordered  Outcome: Completed     Problem: METABOLIC, FLUID AND ELECTROLYTES - ADULT  Goal: Electrolytes maintained within normal limits  Description: INTERVENTIONS:  - Monitor labs and assess patient for signs and symptoms of electrolyte imbalances  - Administer electrolyte replacement as ordered  - Monitor response to electrolyte replacements, including repeat lab results as appropriate  - Instruct patient on fluid and nutrition as appropriate  Outcome: Completed  Goal: Fluid balance maintained  Description: INTERVENTIONS:  - Monitor labs   - Monitor I/O and WT  - Instruct patient on fluid and nutrition as appropriate  - Assess for signs & symptoms of volume excess or deficit  Outcome: Completed

## 2022-05-04 NOTE — ASSESSMENT & PLAN NOTE
· Found in rapid Afib after completing HD Monday  Metoprolol was recently removed outpt due to issues with low BP and dizziness  · HR is now 60-70s  ·  systolic this AM  Pt does have chronically low BP and BP gets lower after HD treatment  · Increased midodrine to 5 mg TID  · Continue lopressor 25 mg BID  · Continue Eliquis

## 2022-05-06 NOTE — PROGRESS NOTES
[facility]  coding opportunities     I13 2     Chart Reviewed number of suggestions sent to Provider: 1     Patients Insurance     Medicare Insurance: [employer]

## 2022-05-10 PROBLEM — E72.20 HYPERAMMONEMIA (HCC): Status: ACTIVE | Noted: 2022-01-01

## 2022-05-10 PROBLEM — R53.1 GENERALIZED WEAKNESS: Status: ACTIVE | Noted: 2022-01-01

## 2022-05-10 NOTE — ASSESSMENT & PLAN NOTE
· POA patient's platelet count 78, likely secondary to impaired synthetic function of liver from cirrhosis (LOPEZ?) noted from hospitalization in [place]   · Monitor platelet count

## 2022-05-10 NOTE — ASSESSMENT & PLAN NOTE
· Patient has a past medical history significant for hypertension  · Continue home medications Toprol XL 25 mg b i d

## 2022-05-10 NOTE — H&P
Heydi 84 [address] 1940, [de-identified] y o  male MRN: [id number]  Unit/Bed#: S -01 Encounter: [id number]  Primary Care Provider: EL Teran   Date and time admitted to hospital: 5/10/2022  1:30 PM    * ESRD (end stage renal disease) [facility])  Assessment & Plan  Lab Results   Component Value Date    EGFR 4 05/10/2022    EGFR 9 05/03/2022    EGFR 14 05/02/2022    CREATININE 10 57 (H) 05/10/2022    CREATININE 5 22 (H) 05/03/2022    CREATININE 3 67 (H) 05/02/2022     · Patient has end-stage renal disease and is no longer making urine  · Patient's creatinine baseline appears to be 4-5  · POA patient's creatinine 10 57  · Of note patient missed dialysis yesterday  · Nephrology is on board and is planning to do dialysis in the morning      Hyperammonemia ([facility] [address] )  Assessment & Plan  · POA patient's ammonia elevated to 167, with signs of mild asterixis and reports of increased forgetfulness per family  · Likely secondary to missed dialysis and underlying cirrhosis likely secondary to LOPEZ per patient's history  · Patient started on lactulose 20 g q 6 with goal of 3 bowel movements in a day  · Started rifaximin 550 g q 12  · Monitor ammonia level and symptoms    Generalized weakness  Assessment & Plan  · Patient reports generalized weakness for the last 2 weeks  · Patient's son at bedside reports since patient was started on dialysis a year ago he has had increased weakness  · Likely secondary to chronic deconditioning  · Consult PT/OT    Paroxysmal atrial fibrillation (HCC)  Assessment & Plan  · Continue home Toprol XL 25 mg b i d   · Patient currently in atrial fibrillation with well-controlled rates    Hyperlipidemia  Assessment & Plan  · Continue home Lipitor 40 mg daily    Thrombocytopenia (HCC)  Assessment & Plan  · POA patient's platelet count 78, likely secondary to impaired synthetic function of liver from cirrhosis (LOPEZ?) noted from hospitalization in [place]   · Monitor platelet count      Benign essential hypertension  Assessment & Plan  · Patient has a past medical history significant for hypertension  · Continue home medications Toprol XL 25 mg b i d       VTE Pharmacologic Prophylaxis: VTE Score: 4 Moderate Risk (Score 3-4) - Pharmacological DVT Prophylaxis Ordered: apixaban (Eliquis)  Code Status: Level 3 - DNAR and DNI   Discussion with family: Updated  (son) at bedside  Anticipated Length of Stay: Patient will be admitted on an inpatient basis with an anticipated length of stay of greater than 2 midnights secondary to missed dialysis  Chief Complaint: generalized weakness and missed dialysis yesterday    History of Present Illness:  Smooth Muñoz is a [de-identified] y o  male with a PMH of HTN, HLD, paroxysmal atrial fibrillation, BPH, ESRD, s/p aortic dissection repair who presents with generalized weakness and missed dialysis yesterday   Patient accompanied by his son at bedside  Patient states for the last 2 weeks he has had generalized weakness and for the last 3 days he has had diarrhea  He also states yesterday he had nausea and vomiting  He denies any recent weight loss, night sweats, change in activities, hematochezia, fever, chills  Does endorse fatigue and his son states he has had dyspnea on exertion for a long time, states ever since he started dialysis  Of no patient missed his dialysis yesterday, Nephrology is on board and they are planning to do dialysis tomorrow morning  Patient does not look volume overloaded on exam     Review of Systems:  Review of Systems   Constitutional: Negative for chills and fever  HENT: Positive for facial swelling  Negative for ear pain and sore throat  Eyes: Negative for pain and visual disturbance  Respiratory: Positive for cough  Negative for shortness of breath  Cardiovascular: Negative for chest pain and palpitations     Gastrointestinal: Positive for abdominal distention, diarrhea, nausea and vomiting  Negative for abdominal pain  Genitourinary: Negative for dysuria and hematuria  Musculoskeletal: Negative for arthralgias and back pain  Skin: Positive for color change  Negative for rash  Neurological: Negative for seizures and syncope  All other systems reviewed and are negative  Past Medical and Surgical History:   Past Medical History:   Diagnosis Date    Acute renal failure superimposed on stage 4 chronic kidney disease ([place] [address] ) 10/4/2020    Arthritis     BPH without urinary obstruction     CKD (chronic kidney disease), stage III (HCC)     baseline 1 6-1 7    Dialysis patient ([place] [address] )     GERD (gastroesophageal reflux disease)     Hyperlipidemia     Hypertension     MI (myocardial infarction) ([place] [address] )        Past Surgical History:   Procedure Laterality Date    APPENDECTOMY      CARDIAC SURGERY      COLONOSCOPY  2017    FRACTURE SURGERY      IR AV FISTULA/GRAFT DECLOT  9/3/2021    IR AV FISTULA/GRAFT DECLOT  9/30/2021    IR AV FISTULA/GRAFT DECLOT  1/7/2022    IR PERITONEAL DIALYSIS CATHETER PLACEMENT  2/3/2021    IR PERITONEAL DIALYSIS CATHETER REMOVAL  2/17/2021    IR PERITONEAL DIALYSIS CATHETER REMOVAL AND PLACEMENT NEW SITE  2/9/2021    IR TEMPORARY DIALYSIS CATHETER PLACEMENT  12/23/2019    IR THORACENTESIS  12/24/2019    IR THORACENTESIS  12/27/2019    IR TUNNELED CENTRAL LINE REMOVAL  4/27/2021    IR TUNNELED DIALYSIS CATHETER CHECK/CHANGE/REPOSITION/ANGIOPLASTY  1/6/2020    IR TUNNELED DIALYSIS CATHETER PLACEMENT  1/2/2020    IR TUNNELED DIALYSIS CATHETER PLACEMENT  2/15/2021    IR TUNNELED DIALYSIS CATHETER REMOVAL  3/4/2020    ME ANASTOMOSIS,AV,ANY SITE Left 3/26/2021    Procedure: LEFT FOREARM LOOP AV GRAFT;  Surgeon: Allen Guzman MD;  [facility]: [facility];  Service: Vascular    ME ASCEND AORTA GRAFT W ROOT REPLACMENT  VALVE CONDUIT/CORON RECONSTRUCT N/A 12/15/2019    Procedure: BENTALL PROCEDURE (ASCENDING AORTIC REPAIR) with 26mm Gelweave Graft; Surgeon: Sandra Farias DO;  [facility];  Service: Cardiac Surgery    MS RECONSTR TOTAL SHOULDER IMPLANT Right 12/5/2017    Procedure: ARTHROPLASTY SHOULDER REVERSE with OPEN CYST EXCISION;  Surgeon: Jessica Reyes MD;  [facility];  Service: Orthopedics    SHOULDER SURGERY      WRIST SURGERY         Meds/Allergies:  Prior to Admission medications    Medication Sig Start Date End Date Taking?  Authorizing Provider   apixaban (Eliquis) 2 5 mg Take 1 tablet (2 5 mg total) by mouth 2 (two) times a day 2/9/22   Jennifer Combs MD   atorvastatin (LIPITOR) 40 mg tablet TAKE 1 TABLET (40 MG TOTAL) BY MOUTH DAILY 4/4/22   Jennifer Combs MD   b complex-vitamin C-folic acid (NEPHROCAPS) 1 mg capsule Take 1 capsule by mouth daily with dinner 2/17/21   Renea Do MD   diphenhydrAMINE (BENADRYL) 25 mg tablet Take 25 mg by mouth every 6 (six) hours as needed for itching    Historical Provider, MD    MG capsule TAKE 1 CAPSULE (100 MG TOTAL) BY MOUTH 2 (TWO) TIMES A DAY 3/1/22   Toño Spivey MD   gabapentin (NEURONTIN) 100 mg capsule TAKE 1 CAPSULE BY MOUTH DAILY IN THE EVENING 12/20/21   Historical Provider, MD   ketoconazole (NIZORAL) 2 % cream APPLY TOPICALLY DAILY 3/16/22   EL Mendoza   lidocaine (LIDODERM) 5 % Apply 1 patch topically daily Remove & Discard patch within 12 hours or as directed by MD 12/23/21   Mariam Johns MD   lidocaine-prilocaine (EMLA) cream Apply topically as needed for mild pain (with HD sessions) With dialysis sessions 3/21/22   EL Mckeon   melatonin 3 mg Take 1 tablet (3 mg total) by mouth daily at bedtime 12/22/21   Mariam Johns MD   metoprolol succinate (TOPROL-XL) 25 mg 24 hr tablet Take 1 tablet (25 mg total) by mouth 2 (two) times a day 5/4/22   Paul Braden PA-C   midodrine (PROAMATINE) 5 mg tablet Take 1 tablet (5 mg total) by mouth 3 (three) times a day before meals 5/4/22 6/3/22  Paul Braden PA-C nystatin (MYCOSTATIN) powder Apply topically 3 (three) times a day as needed (rash) 2/8/22   EL Amos   pantoprazole (PROTONIX) 40 mg tablet TAKE 1 TABLET (40 MG TOTAL) BY MOUTH DAILY IN THE EARLY MORNING 4/1/22   EL Alvarez     I have reviewed home medications with patient personally  Allergies: No Known Allergies    Social History:  Marital Status: /Civil Union   Occupation: Retired   Patient Pre-hospital Living Situation: Home  Patient Pre-hospital Level of Mobility: unable to be assessed at time of evaluation  Patient Pre-hospital Diet Restrictions: Renal  Substance Use History:   Social History     Substance and Sexual Activity   Alcohol Use Not Currently     Social History     Tobacco Use   Smoking Status Never Smoker   Smokeless Tobacco Never Used     Social History     Substance and Sexual Activity   Drug Use Not Currently       Family History:  Family History   Problem Relation Age of Onset    No Known Problems Mother     Hypertension Father     Arthritis Family     No Known Problems Daughter        Physical Exam:     Vitals:   Blood Pressure: 105/54 (05/10/22 1615)  Pulse: 58 (05/10/22 1615)  Temperature: 98 °F (36 7 °C) (05/10/22 1615)  Temp Source: Oral (05/10/22 1615)  Respirations: 18 (05/10/22 1615)  Height: 5' 7" (170 2 cm) (05/10/22 1615)  Weight - Scale: 80 5 kg (177 lb 7 5 oz) (05/10/22 1615)  SpO2: 99 % (05/10/22 1615)    Physical Exam  Vitals and nursing note reviewed  Constitutional:       General: He is not in acute distress  Appearance: He is well-developed  He is not ill-appearing  HENT:      Head: Normocephalic and atraumatic  Eyes:      Conjunctiva/sclera: Conjunctivae normal    Cardiovascular:      Rate and Rhythm: Normal rate  Rhythm irregular  Heart sounds: No murmur heard  Pulmonary:      Effort: Pulmonary effort is normal  No respiratory distress  Breath sounds: Normal breath sounds  Abdominal:      General: There is distension  Palpations: Abdomen is soft  Tenderness: There is no abdominal tenderness  Musculoskeletal:      Cervical back: Neck supple  Right lower leg: Edema (trace) present  Left lower leg: Edema (trace) present  Skin:     General: Skin is warm and dry  Neurological:      Mental Status: He is alert and oriented to person, place, and time  Additional Data:     Lab Results:  Results from last 7 days   Lab Units 05/10/22  1421   WBC Thousand/uL 4 57   HEMOGLOBIN g/dL 10 2*   HEMATOCRIT % 30 5*   PLATELETS Thousands/uL 78*   NEUTROS PCT % 75   LYMPHS PCT % 12*   MONOS PCT % 11   EOS PCT % 1     Results from last 7 days   Lab Units 05/10/22  1421   SODIUM mmol/L 138   POTASSIUM mmol/L 5 3   CHLORIDE mmol/L 103   CO2 mmol/L 24   BUN mg/dL 64*   CREATININE mg/dL 10 57*   ANION GAP mmol/L 11   CALCIUM mg/dL 8 6   ALBUMIN g/dL 3 0*   TOTAL BILIRUBIN mg/dL 1 20*   ALK PHOS U/L 107*   ALT U/L 25   AST U/L 42*   GLUCOSE RANDOM mg/dL 106     Results from last 7 days   Lab Units 05/10/22  1421   INR  1 54*                   Imaging: Reviewed radiology reports from this admission including: chest xray  XR chest 1 view portable   Final Result by Michael Landon MD (05/10 1618)      No acute cardiopulmonary disease  Workstation performed: [id number]         US right upper quadrant with liver dopplers    (Results Pending)       EKG and Other Studies Reviewed on Admission:   · EKG: Atrial fibrillation  HR 70     ** Please Note: This note has been constructed using a voice recognition system   **

## 2022-05-10 NOTE — ED NOTES
Patient family member stated that patient complaining of IV  RN assessed IV, dressing dry and intact, IV flushed without difficulty, blood return noted, and no sign of infiltration or phlebitis noted  RN stated site might be sore from initial stick, or blood pressure cuff may have caused discomfort  Family upset, and stated "it's the needle in the IV " RN educated family that needle is not present in IV, and catheter remains for access  Family upset stating, "I'm a medical assistant  I know it's bothering him  I swear you nurses here are stupid " RN requested family refrain from calling staff names  Charge outside of room, and able to talk to patient family about IV and avoiding multiple sticks  This RN spoke with patient directly flushed IV and blood return noted  Patient asked if IV is causing pain and would like it removed, or is patient just tender  Patient states area is tender at IV insertion, but is not causing pain  Patient states it feels better now  Patient appears in good spirits, and family denied further questions or concerns  Charge aware of family concerns       Altagracia Quispe RN  05/10/22 1550

## 2022-05-10 NOTE — ASSESSMENT & PLAN NOTE
Lab Results   Component Value Date    EGFR 4 05/10/2022    EGFR 9 05/03/2022    EGFR 14 05/02/2022    CREATININE 10 57 (H) 05/10/2022    CREATININE 5 22 (H) 05/03/2022    CREATININE 3 67 (H) 05/02/2022     · Patient has end-stage renal disease and is no longer making urine  · Patient's creatinine baseline appears to be 4-5  · POA patient's creatinine 10 57  · Of note patient missed dialysis yesterday  · Nephrology is on board and is planning to do dialysis in the morning

## 2022-05-10 NOTE — TREATMENT PLAN
Patient seen in the emergency room  Complaining of weakness, persistent diarrhea for 2 weeks  Intermittent nausea with dry heaves  Nonproductive cough  Reports that he had no longer makes urine  His family members with him who states that he has been having facial swelling when he awakens in the morning which resolves with upright position  She notes that his abdomen appears to be slightly more distended than usual   He missed hemodialysis treatment yesterday  Tolerating room air  Plan: At this time we will plan for treatment tomorrow  If he remains hospitalized we will perform treatment tomorrow morning  If he is discharged he will go to [facility] for outpatient treatment  Full consult to follow

## 2022-05-10 NOTE — CONSULTS
[address] [facility] [de-identified] y o  male MRN: [id number]  Unit/Bed#: FT 04 Encounter: [id number]    ASSESSMENT and PLAN:  1  [place] [facility] [place]   · Missed hemodialysis treatment on 05/09  · No compelling reason for treatment today  Will plan on treatment in a m , 5/11  2  Access:    · Left arm AV fistula  3  Blood pressure/volume status:  · Mild volume overload  · Blood pressure acceptable:  Continue midodrine, metoprolol  · No diuretic-patient reports not urinating  4  Anemia:   · Hemoglobin at goal for ESRD, 10 2  · Low-dose Epogen 1800 units with each treatment  · Venofer 50 mg weekly  5  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:    · Hectorol 3 mcg with each treatment (dose recently increased)  · Outpatient phosphorus levels at goal   Not on a binder  · Not on Sensipar at this time  6  Weakness:    · Reports generalized weakness  · Ammonia level 167  · Workup per primary service  7  Diarrhea, intermittent nausea, dry heaves:    · No sick contacts reported  · On PPI  · KT/V adequate reflecting adequate clearances at hemodialysis  · Workup per primary service  8  Atrial fibrillation:    · Rate controlled  On beta-blocker    Other:  Status post aortic dissection with repair, postoperative nonunion of the sternum    SUMMARY OF RECOMMENDATIONS:   Hemodialysis tomorrow    HISTORY OF PRESENT ILLNESS:  Requesting Physician: Frederick Harding*  Reason for Consult: ESRD on HD    Sohail Muñoz is a [de-identified] y o  male with a past medical history of ESRD on hemodialysis, atrial fibrillation, status post aortic dissection repair postoperative course complicated by nonunion of sternum who was admitted to S  after presenting with weakness, diarrhea x2 weeks, intermittent nausea, dry heaves  Family member in attendance who reports that he has been having symptoms for approximately 2 weeks    He was recently hospitalized from 5/2 to 5/4 for atrial fibrillation with RVR  This was likely due to recent discontinuance of beta-blocker because of hypotension  Midodrine was reintroduced and metoprolol was restarted for management of atrial fibrillation  His last hemodialysis treatment was on 5/6/2022  He missed treatment on 05/09 and presented to the emergency room on 05/10  In addition to the above he is having a dry cough  No significant lower extremity edema  Abdominal distension  Ammonia level found to be 167 on admission  On review of prior imaging CT of the abdomen June 2021 revealed nodular appearance of liver suggesting cirrhosis  AST elevation noted since June 2021  A renal consultation is requested today for assistance in the management of ESRD  Josefina Zimmerman is a known ESRD patient who undergoes maintenance hemodialysis at [facility] on Monday, Wednesday, Friday      PAST MEDICAL HISTORY:  Past Medical History:   Diagnosis Date    Acute renal failure superimposed on stage 4 chronic kidney disease ([facility] [address] ) 10/4/2020    Arthritis     BPH without urinary obstruction     CKD (chronic kidney disease), stage III (HCC)     baseline 1 6-1 7    Dialysis patient ([facility] [address] )     GERD (gastroesophageal reflux disease)     Hyperlipidemia     Hypertension     MI (myocardial infarction) ([facility] [address] )        PAST SURGICAL HISTORY:  Past Surgical History:   Procedure Laterality Date    APPENDECTOMY      CARDIAC SURGERY      COLONOSCOPY  2017    FRACTURE SURGERY      IR AV FISTULA/GRAFT DECLOT  9/3/2021    IR AV FISTULA/GRAFT DECLOT  9/30/2021    IR AV FISTULA/GRAFT DECLOT  1/7/2022    IR PERITONEAL DIALYSIS CATHETER PLACEMENT  2/3/2021    IR PERITONEAL DIALYSIS CATHETER REMOVAL  2/17/2021    IR PERITONEAL DIALYSIS CATHETER REMOVAL AND PLACEMENT NEW SITE  2/9/2021    IR TEMPORARY DIALYSIS CATHETER PLACEMENT  12/23/2019    IR THORACENTESIS  12/24/2019    IR THORACENTESIS  12/27/2019    IR TUNNELED CENTRAL LINE REMOVAL  4/27/2021    IR TUNNELED DIALYSIS CATHETER CHECK/CHANGE/REPOSITION/ANGIOPLASTY  1/6/2020    IR TUNNELED DIALYSIS CATHETER PLACEMENT  1/2/2020    IR TUNNELED DIALYSIS CATHETER PLACEMENT  2/15/2021    IR TUNNELED DIALYSIS CATHETER REMOVAL  3/4/2020    IL ANASTOMOSIS,AV,ANY SITE Left 3/26/2021    Procedure: LEFT FOREARM LOOP AV GRAFT;  Surgeon: Shamika Scott MD;  [facility]: [facility];  Service: Vascular    IL ASCEND AORTA GRAFT W ROOT REPLACMENT  VALVE CONDUIT/CORON RECONSTRUCT N/A 12/15/2019    Procedure: BENTALL PROCEDURE (ASCENDING AORTIC REPAIR) with 26mm Gelweave Graft;  Surgeon: Brody Murillo DO;  Location[facility];  Service: Cardiac Surgery    IL RECONSTR TOTAL SHOULDER IMPLANT Right 12/5/2017    Procedure: ARTHROPLASTY SHOULDER REVERSE with OPEN CYST EXCISION;  Surgeon: Bridger Carreon MD;  Location[facility];  Service: Orthopedics    SHOULDER SURGERY      WRIST SURGERY         ALLERGIES:  No Known Allergies    SOCIAL HISTORY:  Social History     Substance and Sexual Activity   Alcohol Use Not Currently     Social History     Substance and Sexual Activity   Drug Use Not Currently     Social History     Tobacco Use   Smoking Status Never Smoker   Smokeless Tobacco Never Used       FAMILY HISTORY:  Family History   Problem Relation Age of Onset    No Known Problems Mother     Hypertension Father     Arthritis Family     No Known Problems Daughter        MEDICATIONS:  No current facility-administered medications for this encounter      Current Outpatient Medications:     apixaban (Eliquis) 2 5 mg, Take 1 tablet (2 5 mg total) by mouth 2 (two) times a day, Disp: 180 tablet, Rfl: 3    atorvastatin (LIPITOR) 40 mg tablet, TAKE 1 TABLET (40 MG TOTAL) BY MOUTH DAILY, Disp: 90 tablet, Rfl: 0    b complex-vitamin C-folic acid (NEPHROCAPS) 1 mg capsule, Take 1 capsule by mouth daily with dinner, Disp: 30 capsule, Rfl: 0    diphenhydrAMINE (BENADRYL) 25 mg tablet, Take 25 mg by mouth every 6 (six) hours as needed for itching, Disp: , Rfl:      MG capsule, TAKE 1 CAPSULE (100 MG TOTAL) BY MOUTH 2 (TWO) TIMES A DAY, Disp: 30 capsule, Rfl: 3    gabapentin (NEURONTIN) 100 mg capsule, TAKE 1 CAPSULE BY MOUTH DAILY IN THE EVENING, Disp: , Rfl:     ketoconazole (NIZORAL) 2 % cream, APPLY TOPICALLY DAILY, Disp: 30 g, Rfl: 2    lidocaine (LIDODERM) 5 %, Apply 1 patch topically daily Remove & Discard patch within 12 hours or as directed by MD, Disp: 30 patch, Rfl: 0    lidocaine-prilocaine (EMLA) cream, Apply topically as needed for mild pain (with HD sessions) With dialysis sessions, Disp: 30 g, Rfl: 3    melatonin 3 mg, Take 1 tablet (3 mg total) by mouth daily at bedtime, Disp: 30 tablet, Rfl: 0    metoprolol succinate (TOPROL-XL) 25 mg 24 hr tablet, Take 1 tablet (25 mg total) by mouth 2 (two) times a day, Disp: 60 tablet, Rfl: 0    midodrine (PROAMATINE) 5 mg tablet, Take 1 tablet (5 mg total) by mouth 3 (three) times a day before meals, Disp: 90 tablet, Rfl: 0    nystatin (MYCOSTATIN) powder, Apply topically 3 (three) times a day as needed (rash), Disp: 45 g, Rfl: 1    pantoprazole (PROTONIX) 40 mg tablet, TAKE 1 TABLET (40 MG TOTAL) BY MOUTH DAILY IN THE EARLY MORNING, Disp: 30 tablet, Rfl: 2    REVIEW OF SYSTEMS:  Constitutional:  Positive for fatigue, weakness  No fevers or chills reported  HENT: Negative for postnasal drip  Eyes: Negative for visual disturbance  Respiratory:  Positive for cough  Exertional shortness of breath  Cardiovascular:  Negative for chest pain and palpitations  Positive for mild leg swelling  Gastrointestinal:  Positive for diarrhea, nausea, dry heaves, mild distension  Genitourinary: No dysuria, hematuria  Patient reports not making urine  Musculoskeletal:  No unusual arthralgia or myalgia reported  Skin: Negative for rash  Neurological:  Negative for focal weakness  Hematological: Negative for  bleeding    Psychiatric/Behavioral: Negative for confusion   All the systems were reviewed and were negative except as documented on the HPI  PHYSICAL EXAM:  Current Weight:    First Weight:    Vitals:    05/10/22 1328 05/10/22 1400   BP: 111/57 123/67   Pulse: 80 82   Resp: 16 16   Temp: 97 9 °F (36 6 °C)    TempSrc: Oral    SpO2: 98% 96%     No intake or output data in the 24 hours ending 05/10/22 1617  Physical Exam  Constitutional:       General: He is not in acute distress  Appearance: He is well-developed  He is ill-appearing  He is not toxic-appearing or diaphoretic  HENT:      Head: Normocephalic and atraumatic  Nose: Nose normal  No congestion  Mouth/Throat:      Mouth: Mucous membranes are moist       Pharynx: No oropharyngeal exudate  Eyes:      General: No scleral icterus  Right eye: No discharge  Left eye: No discharge  Extraocular Movements: Extraocular movements intact  Conjunctiva/sclera: Conjunctivae normal    Neck:      Vascular: No carotid bruit or JVD  Trachea: No tracheal deviation  Cardiovascular:      Rate and Rhythm: Normal rate  Rhythm irregular  Heart sounds: No murmur heard  No friction rub  No gallop  Pulmonary:      Effort: Pulmonary effort is normal  No respiratory distress  Breath sounds: No stridor  Rhonchi present  No wheezing or rales  Abdominal:      General: Bowel sounds are normal  There is distension  Palpations: Abdomen is soft  Tenderness: There is no abdominal tenderness  There is no guarding or rebound  Musculoskeletal:         General: No swelling, tenderness or signs of injury  Normal range of motion  Cervical back: Normal range of motion and neck supple  No rigidity or tenderness  Right lower leg: Edema present  Left lower leg: Edema present  Comments: Mild lower extremity edema   Skin:     General: Skin is warm and dry  Capillary Refill: Capillary refill takes less than 2 seconds  Coloration: Skin is not jaundiced or pale        Findings: No bruising, erythema or rash  Neurological:      General: No focal deficit present  Mental Status: He is alert and oriented to person, place, and time  Psychiatric:         Mood and Affect: Mood normal          Behavior: Behavior normal          Thought Content:  Thought content normal          Judgment: Judgment normal          Invasive Devices:      Lab Results:   Results from last 7 days   Lab Units 05/10/22  1421   WBC Thousand/uL 4 57   HEMOGLOBIN g/dL 10 2*   HEMATOCRIT % 30 5*   PLATELETS Thousands/uL 78*   POTASSIUM mmol/L 5 3   CHLORIDE mmol/L 103   CO2 mmol/L 24   BUN mg/dL 64*   CREATININE mg/dL 10 57*   CALCIUM mg/dL 8 6   ALK PHOS U/L 107*   ALT U/L 25   AST U/L 42*     Lab Results   Component Value Date     2 (H) 11/11/2020    CALCIUM 8 6 05/10/2022    PHOS 3 8 02/16/2021     Other Studies:

## 2022-05-10 NOTE — ASSESSMENT & PLAN NOTE
· POA patient's ammonia elevated to 167, with signs of mild asterixis and reports of increased forgetfulness per family  · Likely secondary to missed dialysis and underlying cirrhosis likely secondary to LOPEZ per patient's history  · Patient started on lactulose 20 g q 6 with goal of 3 bowel movements in a day  · Started rifaximin 550 g q 12  · Monitor ammonia level and symptoms

## 2022-05-10 NOTE — ASSESSMENT & PLAN NOTE
· Patient reports generalized weakness for the last 2 weeks  · Patient's son at bedside reports since patient was started on dialysis a year ago he has had increased weakness  · Likely secondary to chronic deconditioning  · Consult PT/OT

## 2022-05-10 NOTE — ASSESSMENT & PLAN NOTE
· Continue home Toprol XL 25 mg b i d   · Patient currently in atrial fibrillation with well-controlled rates

## 2022-05-10 NOTE — ED PROVIDER NOTES
History  Chief Complaint   Patient presents with    Weakness - Generalized     Pt reports generalized weakness and fatigue x 2 weeks  Pt reports n/v/d since yesterday  74-year-old male presents with his granddaughter at bedside complaining of generalized weakness, nausea, retching, diarrhea, patient is end-stage renal disease on dialysis and missed his dialysis appointment yesterday because he was not feeling well  Patient's granddaughter reports he has also had a significant cough that is worse when he is sleeping, swelling in his face, patient denies any dizziness, headache, chest pain, dysuria, patient does still make urine and has had no hematuria, no hematochezia, patient has been extraordinarily somnolent and has been sleeping very frequently per the granddaughter  Patient also appears jaundiced lately  Patient was recently admitted for similar complaints and granddaughter reports they did not find any cause for his complaints  Prior to Admission Medications   Prescriptions Last Dose Informant Patient Reported? Taking?  MG capsule   No No   Sig: TAKE 1 CAPSULE (100 MG TOTAL) BY MOUTH 2 (TWO) TIMES A DAY   apixaban (Eliquis) 2 5 mg 5/10/2022 at Unknown time  No Yes   Sig: Take 1 tablet (2 5 mg total) by mouth 2 (two) times a day   atorvastatin (LIPITOR) 40 mg tablet 5/10/2022 at Unknown time  No Yes   Sig: TAKE 1 TABLET (40 MG TOTAL) BY MOUTH DAILY   b complex-vitamin C-folic acid (NEPHROCAPS) 1 mg capsule Not Taking at Unknown time Child No No   Sig: Take 1 capsule by mouth daily with dinner   Patient not taking: Reported on 5/10/2022    diphenhydrAMINE (BENADRYL) 25 mg tablet Not Taking at Unknown time Child Yes No   Sig: Take 25 mg by mouth every 6 (six) hours as needed for itching   Patient not taking: Reported on 5/10/2022    docusate sodium (COLACE) 100 mg capsule   No No   Sig: Take 1 capsule (100 mg total) by mouth in the morning and 1 capsule (100 mg total) in the evening  gabapentin (NEURONTIN) 100 mg capsule 5/9/2022 at Unknown time  Yes Yes   Sig: TAKE 1 CAPSULE BY MOUTH DAILY IN THE EVENING   ketoconazole (NIZORAL) 2 % cream Past Week at Unknown time  No Yes   Sig: APPLY TOPICALLY DAILY   lidocaine (LIDODERM) 5 % More than a month at Unknown time  No No   Sig: Apply 1 patch topically daily Remove & Discard patch within 12 hours or as directed by MD   lidocaine-prilocaine (EMLA) cream 5/9/2022 at Unknown time  No Yes   Sig: Apply topically as needed for mild pain (with HD sessions) With dialysis sessions   melatonin 3 mg Not Taking at Unknown time  No No   Sig: Take 1 tablet (3 mg total) by mouth daily at bedtime   Patient not taking: Reported on 5/10/2022    metoprolol succinate (TOPROL-XL) 25 mg 24 hr tablet 5/10/2022 at Unknown time  No Yes   Sig: Take 1 tablet (25 mg total) by mouth 2 (two) times a day   midodrine (PROAMATINE) 5 mg tablet 5/10/2022 at Unknown time  No Yes   Sig: Take 1 tablet (5 mg total) by mouth 3 (three) times a day before meals   nystatin (MYCOSTATIN) powder 5/9/2022 at Unknown time  No Yes   Sig: Apply topically 3 (three) times a day as needed (rash)   pantoprazole (PROTONIX) 40 mg tablet 5/10/2022 at Unknown time  No Yes   Sig: TAKE 1 TABLET (40 MG TOTAL) BY MOUTH DAILY IN THE EARLY MORNING      Facility-Administered Medications: None       Past Medical History:   Diagnosis Date    Acute renal failure superimposed on stage 4 chronic kidney disease ([facility] ) 10/4/2020    Arthritis     BPH without urinary obstruction     CKD (chronic kidney disease), stage III (HCC)     baseline 1 6-1 7    [facility]     GERD (gastroesophageal reflux disease)     Hyperlipidemia     Hypertension     MI [facility]myocardial infarction) ([facility] )        Past Surgical History:   Procedure Laterality Date    APPENDECTOMY      CARDIAC SURGERY      COLONOSCOPY  2017    FRACTURE SURGERY      IR AV FISTULA/GRAFT DECLOT  9/3/2021    IR AV FISTULA/GRAFT DECLOT  9/30/2021  IR AV FISTULA/GRAFT DECLOT  1/7/2022    IR PERITONEAL DIALYSIS CATHETER PLACEMENT  2/3/2021    IR PERITONEAL DIALYSIS CATHETER REMOVAL  2/17/2021    IR PERITONEAL DIALYSIS CATHETER REMOVAL AND PLACEMENT NEW SITE  2/9/2021    IR TEMPORARY DIALYSIS CATHETER PLACEMENT  12/23/2019    IR THORACENTESIS  12/24/2019    IR THORACENTESIS  12/27/2019    IR TUNNELED CENTRAL LINE REMOVAL  4/27/2021    IR TUNNELED DIALYSIS CATHETER CHECK/CHANGE/REPOSITION/ANGIOPLASTY  1/6/2020    IR TUNNELED DIALYSIS CATHETER PLACEMENT  1/2/2020    IR TUNNELED DIALYSIS CATHETER PLACEMENT  2/15/2021    IR TUNNELED DIALYSIS CATHETER REMOVAL  3/4/2020    MD ANASTOMOSIS,AV,ANY SITE Left 3/26/2021    Procedure: LEFT FOREARM LOOP AV GRAFT;  Surgeon: Grant Cleary MD;  [facility];  Service: Vascular    MD ASCEND AORTA GRAFT W ROOT REPLACMENT  VALVE CONDUIT/CORON RECONSTRUCT N/A 12/15/2019    Procedure: BENTALL PROCEDURE (ASCENDING AORTIC REPAIR) with 26mm Gelweave Graft;  Surgeon: Cornel Tobias DO;  [facility];  Service: Cardiac Surgery    MD RECONSTR TOTAL SHOULDER IMPLANT Right 12/5/2017    Procedure: ARTHROPLASTY SHOULDER REVERSE with OPEN CYST EXCISION;  Surgeon: Marisabel Persaud MD;  [facility];  Service: Orthopedics    SHOULDER SURGERY      WRIST SURGERY         Family History   Problem Relation Age of Onset    No Known Problems Mother     Hypertension Father     Arthritis Family     No Known Problems Daughter      I have reviewed and agree with the history as documented      E-Cigarette/Vaping    E-Cigarette Use Never User      E-Cigarette/Vaping Substances    Nicotine No     THC No     CBD No     Flavoring No      Social History     Tobacco Use    Smoking status: Never Smoker    Smokeless tobacco: Never Used   Vaping Use    Vaping Use: Never used   Substance Use Topics    Alcohol use: Not Currently    Drug use: Not Currently       Review of Systems   Constitutional: Positive for fatigue  Negative for fever  HENT: Negative for congestion  Eyes: Negative for visual disturbance  Respiratory: Positive for cough  Negative for shortness of breath  Cardiovascular: Negative for chest pain  Gastrointestinal: Positive for diarrhea, nausea and vomiting  Negative for abdominal pain  Endocrine: Negative for polyuria  Genitourinary: Negative for dysuria and hematuria  Musculoskeletal: Negative for myalgias  Neurological: Negative for dizziness and headaches  Physical Exam  Physical Exam  Constitutional:       Appearance: He is ill-appearing  HENT:      Head: Normocephalic and atraumatic  Right Ear: External ear normal       Left Ear: External ear normal       Mouth/Throat:      Mouth: Mucous membranes are moist       Pharynx: Oropharynx is clear  Eyes:      Conjunctiva/sclera: Conjunctivae normal       Pupils: Pupils are equal, round, and reactive to light  Cardiovascular:      Rate and Rhythm: Normal rate  Rhythm irregular  Heart sounds: Normal heart sounds  Pulmonary:      Breath sounds: Rhonchi present  Abdominal:      General: Abdomen is flat  There is no distension  Palpations: Abdomen is soft  Tenderness: There is no abdominal tenderness  Musculoskeletal:         General: No deformity  Normal range of motion  Cervical back: Normal range of motion  Skin:     General: Skin is warm and dry  Coloration: Skin is jaundiced  Neurological:      General: No focal deficit present  Mental Status: He is alert and oriented to person, place, and time     Psychiatric:         Mood and Affect: Mood normal          Behavior: Behavior normal          Vital Signs  ED Triage Vitals   Temperature Pulse Respirations Blood Pressure SpO2   05/10/22 1328 05/10/22 1328 05/10/22 1328 05/10/22 1328 05/10/22 1328   97 9 °F (36 6 °C) 80 16 111/57 98 %      Temp Source Heart Rate Source Patient Position - Orthostatic VS BP Location FiO2 (%)   05/10/22 1328 05/10/22 1328 05/10/22 1400 05/10/22 1615 --   Oral Monitor Lying Right arm       Pain Score       05/10/22 1328       No Pain           Vitals:    05/13/22 1500 05/13/22 1530 05/13/22 1600 05/13/22 1610   BP: 112/59 97/60 109/87 102/53   Pulse: 62 84 77    Patient Position - Orthostatic VS:    Lying         Visual Acuity      ED Medications  Medications   albumin human (FLEXBUMIN) 25 % injection 25 g (25 g Intravenous New Bag 5/10/22 2021)   midodrine (PROAMATINE) tablet 5 mg (5 mg Oral New Bag 5/11/22 0921)       Diagnostic Studies  Results Reviewed     Procedure Component Value Units Date/Time    HS Troponin I 4hr [[id number]]  (Normal) Collected: 05/10/22 1933    Lab Status: Final result Specimen: Blood from Arm, Right Updated: 05/10/22 2028     hs TnI 4hr 47 ng/L      Delta 4hr hsTnI -7 ng/L     HS Troponin I 2hr [[id number]]  (Normal) Collected: 05/10/22 1614    Lab Status: Final result Specimen: Blood from Arm, Right Updated: 05/10/22 1702     hs TnI 2hr 48 ng/L      Delta 2hr hsTnI -6 ng/L     COVID/FLU/RSV - 2 hour TAT [[id number]]  (Normal) Collected: 05/10/22 1421    Lab Status: Final result Specimen: Nares from Nose Updated: 05/10/22 1508     SARS-CoV-2 Negative     INFLUENZA A PCR Negative     INFLUENZA B PCR Negative     RSV PCR Negative    Narrative:      FOR PEDIATRIC PATIENTS - copy/paste COVID Guidelines URL to browser: [email or web address]    SARS-CoV-2 assay is a Nucleic Acid Amplification assay intended for the  qualitative detection of nucleic acid from SARS-CoV-2 in nasopharyngeal  swabs  Results are for the presumptive identification of SARS-CoV-2 RNA  Positive results are indicative of infection with SARS-CoV-2, the virus  causing COVID-19, but do not rule out bacterial infection or co-infection  with other viruses   Laboratories within the [place] and its  territories are required to report all positive results to the appropriate  public health authorities  Negative results do not preclude SARS-CoV-2  infection and should not be used as the sole basis for treatment or other  patient management decisions  Negative results must be combined with  clinical observations, patient history, and epidemiological information  This test has not been FDA cleared or approved  This test has been authorized by FDA under an Emergency Use Authorization  (EUA)  This test is only authorized for the duration of time the  declaration that circumstances exist justifying the authorization of the  emergency use of an in vitro diagnostic tests for detection of SARS-CoV-2  virus and/or diagnosis of COVID-19 infection under section 564(b)(1) of  the Act, 21 U  S C  958SVS-2(B)(9), unless the authorization is terminated  or revoked sooner  The test has been validated but independent review by FDA  and CLIA is pending  Test performed using [employer] Gene[employer]pert: This RT-PCR assay targets N2,  a region unique to SARS-CoV-2  A conserved region in the E-gene was chosen  for pan-Sarbecovirus detection which includes SARS-CoV-2      HS Troponin 0hr (reflex protocol) [[id number]]  (Abnormal) Collected: 05/10/22 1421    Lab Status: Final result Specimen: Blood from Arm, Right Updated: 05/10/22 1456     hs TnI 0hr 54 ng/L     Comprehensive metabolic panel [[id number]]  (Abnormal) Collected: 05/10/22 1421    Lab Status: Final result Specimen: Blood from Arm, Right Updated: 05/10/22 1448     Sodium 138 mmol/L      Potassium 5 3 mmol/L      Chloride 103 mmol/L      CO2 24 mmol/L      ANION GAP 11 mmol/L      BUN 64 mg/dL      Creatinine 10 57 mg/dL      Glucose 106 mg/dL      Calcium 8 6 mg/dL      Corrected Calcium 9 4 mg/dL      AST 42 U/L      ALT 25 U/L      Alkaline Phosphatase 107 U/L      Total Protein 7 5 g/dL      Albumin 3 0 g/dL      Total Bilirubin 1 20 mg/dL      eGFR 4 ml/min/1 73sq m     Narrative:      [place] guidelines for Chronic Kidney Disease (CKD):     Stage 1 with normal or high GFR (GFR > 90 mL/min/1 73 square meters)    Stage 2 Mild CKD (GFR = 60-89 mL/min/1 73 square meters)    Stage 3A Moderate CKD (GFR = 45-59 mL/min/1 73 square meters)    Stage 3B Moderate CKD (GFR = 30-44 mL/min/1 73 square meters)    Stage 4 Severe CKD (GFR = 15-29 mL/min/1 73 square meters)    Stage 5 End Stage CKD (GFR <15 mL/min/1 73 square meters)  Note: GFR calculation is accurate only with a steady state creatinine    Lipase [[id number]]  (Normal) Collected: 05/10/22 1421    Lab Status: Final result Specimen: Blood from Arm, Right Updated: 05/10/22 1448     Lipase 35 u/L     Protime-INR [[id number]]  (Abnormal) Collected: 05/10/22 1421    Lab Status: Final result Specimen: Blood from Arm, Right Updated: 05/10/22 1447     Protime 18 4 seconds      INR 1 54    APTT [[id number]]  (Abnormal) Collected: 05/10/22 1421    Lab Status: Final result Specimen: Blood from Arm, Right Updated: 05/10/22 1447     PTT 45 seconds     Ammonia [[id number]]  (Abnormal) Collected: 05/10/22 1421    Lab Status: Final result Specimen: Blood from Arm, Right Updated: 05/10/22 1447     Ammonia 167 umol/L     CBC and differential [[id number]]  (Abnormal) Collected: 05/10/22 1421    Lab Status: Final result Specimen: Blood from Arm, Right Updated: 05/10/22 1433     WBC 4 57 Thousand/uL      RBC 2 99 Million/uL      Hemoglobin 10 2 g/dL      Hematocrit 30 5 %       fL      MCH 34 1 pg      MCHC 33 4 g/dL      RDW 15 9 %      MPV 10 9 fL      Platelets 78 Thousands/uL      nRBC 0 /100 WBCs      Neutrophils Relative 75 %      Immat GRANS % 1 %      Lymphocytes Relative 12 %      Monocytes Relative 11 %      Eosinophils Relative 1 %      Basophils Relative 0 %      Neutrophils Absolute 3 42 Thousands/µL      Immature Grans Absolute 0 04 Thousand/uL      Lymphocytes Absolute 0 56 Thousands/µL      Monocytes Absolute 0 50 Thousand/µL      Eosinophils Absolute 0 04 Thousand/µL Basophils Absolute 0 01 Thousands/µL                  US right upper quadrant with liver dopplers   Final Result by Jason Cary MD (05/11 6745)      Nodular hepatic contour in keeping with cirrhosis  Bidirectional flow noted within the portal vein  Small volume of ascites  Gallbladder sludge and nonspecific gallbladder wall thickening in the presence of ascites  Workstation performed: [id number]         XR chest 1 view portable   Final Result by Rossy Quintanilla MD (05/10 4247)      No acute cardiopulmonary disease  Workstation performed: [id number]                    Procedures  ECG 12 Lead Documentation Only    Date/Time: 5/10/2022 2:48 PM  Performed by: Jayson Matias MD  Authorized by: Jayson Matias MD     ECG reviewed by me, the ED Provider: yes    Patient location:  ED  Previous ECG:     Previous ECG:  Compared to current    Similarity:  No change  Interpretation:     Interpretation: normal    Rate:     ECG rate:  70    ECG rate assessment: normal    Rhythm:     Rhythm: atrial fibrillation    Ectopy:     Ectopy: none    QRS:     QRS axis:  Normal    QRS intervals:  Normal  Conduction:     Conduction: normal    ST segments:     ST segments:  Normal  T waves:     T waves: flattening               ED Course       MDM  Number of Diagnoses or Management Options  ESRD needing dialysis ([address] )  Hyperammonemia (HCC)  Listlessness  Diagnosis management comments: Patient found to have multiple electrolyte abnormalities on workup, will be admitted for further management of complications of liver failure        Disposition  Final diagnoses:   Hyperammonemia ([address] )   ESRD needing dialysis ([address] )   Listlessness     Time reflects when diagnosis was documented in both MDM as applicable and the Disposition within this note     Time User Action Codes Description Comment    5/10/2022  4:11 PM Lacy May Add [E72 20] Hyperammonemia ([address] )     5/10/2022  4:11 PM Abel Bella Add [A25 3,  Z99 2] ESRD needing dialysis ([address] )     5/10/2022  4:11 PM Abel Bella Add [R53 83] Listlessness     5/13/2022 11:59 AM Adelina Cotto Add [K72 00] Acute hepatic encephalopathy       ED Disposition     ED Disposition   Admit    Condition   Stable    Date/Time   Tue May 10, 2022  4:10 PM    Comment   Case was discussed with Dr Delores Siddiqui and the patient's admission status was agreed to be Admission Status: inpatient status to the service of Dr Delores Siddiqui   Follow-up Information     Follow up With Specialties Details Why [address] [facility]  Follow up Please arrive for chair time on Monday at 0600  [id number] [facility]  Follow up  [address] [place] [address]  [phone]            Discharge Medication List as of 5/13/2022  4:27 PM      CONTINUE these medications which have NOT CHANGED    Details   apixaban (Eliquis) 2 5 mg Take 1 tablet (2 5 mg total) by mouth 2 (two) times a day, Starting Wed 2/9/2022, Normal      atorvastatin (LIPITOR) 40 mg tablet TAKE 1 TABLET (40 MG TOTAL) BY MOUTH DAILY, Starting Mon 4/4/2022, Normal      !! docusate sodium (COLACE) 100 mg capsule Take 1 capsule (100 mg total) by mouth in the morning and 1 capsule (100 mg total) in the evening , Starting Wed 5/11/2022, Until Tue 8/9/2022, Normal      !!   MG capsule TAKE 1 CAPSULE (100 MG TOTAL) BY MOUTH 2 (TWO) TIMES A DAY, Normal      gabapentin (NEURONTIN) 100 mg capsule TAKE 1 CAPSULE BY MOUTH DAILY IN THE EVENING, Historical Med      ketoconazole (NIZORAL) 2 % cream APPLY TOPICALLY DAILY, Starting Wed 3/16/2022, Normal      lidocaine (LIDODERM) 5 % Apply 1 patch topically daily Remove & Discard patch within 12 hours or as directed by MD, Starting Thu 12/23/2021, Normal      lidocaine-prilocaine (EMLA) cream Apply topically as needed for mild pain (with HD sessions) With dialysis sessions, Starting Mon 3/21/2022, Normal      metoprolol succinate (TOPROL-XL) 25 mg 24 hr tablet Take 1 tablet (25 mg total) by mouth 2 (two) times a day, Starting Wed 5/4/2022, Normal      midodrine (PROAMATINE) 5 mg tablet Take 1 tablet (5 mg total) by mouth 3 (three) times a day before meals, Starting Wed 5/4/2022, Until Fri 6/3/2022, Normal      nystatin (MYCOSTATIN) powder Apply topically 3 (three) times a day as needed (rash), Starting Tue 2/8/2022, Normal      pantoprazole (PROTONIX) 40 mg tablet TAKE 1 TABLET (40 MG TOTAL) BY MOUTH DAILY IN THE EARLY MORNING, Starting Fri 4/1/2022, Normal       !! - Potential duplicate medications found  Please discuss with provider        STOP taking these medications       b complex-vitamin C-folic acid (NEPHROCAPS) 1 mg capsule Comments:   Reason for Stopping:         diphenhydrAMINE (BENADRYL) 25 mg tablet Comments:   Reason for Stopping:         melatonin 3 mg Comments:   Reason for Stopping:                   PDMP Review       Value Time User    PDMP Reviewed  Yes 12/22/2021  1:56 PM Leigha Leavitt MD          ED Provider  Electronically Signed by           Karen Santiago MD  05/21/22 4079

## 2022-05-11 NOTE — PLAN OF CARE
Problem: Potential for Falls  Goal: Patient will remain free of falls  Description: INTERVENTIONS:  - Educate patient/family on patient safety including physical limitations  - Instruct patient to call for assistance with activity   - Consult OT/PT to assist with strengthening/mobility   - Keep Call bell within reach  - Keep bed low and locked with side rails adjusted as appropriate  - Keep care items and personal belongings within reach  - Initiate and maintain comfort rounds  - Make Fall Risk Sign visible to staff  - Offer Toileting every 2 Hours, in advance of need  - Apply yellow socks and bracelet for high fall risk patients  - Consider moving patient to room near nurses station  Outcome: Progressing     Problem: METABOLIC, FLUID AND ELECTROLYTES - ADULT  Goal: Electrolytes maintained within normal limits  Description: INTERVENTIONS:  - Monitor labs and assess patient for signs and symptoms of electrolyte imbalances  - Administer electrolyte replacement as ordered  - Monitor response to electrolyte replacements, including repeat lab results as appropriate  - Instruct patient on fluid and nutrition as appropriate  Outcome: Progressing  Goal: Fluid balance maintained  Description: INTERVENTIONS:  - Monitor labs   - Monitor I/O and WT  - Instruct patient on fluid and nutrition as appropriate  - Assess for signs & symptoms of volume excess or deficit  Outcome: Progressing     Problem: DISCHARGE PLANNING  Goal: Discharge to home or other facility with appropriate resources  Description: INTERVENTIONS:  - Identify barriers to discharge w/patient and caregiver  - Arrange for needed discharge resources and transportation as appropriate  - Identify discharge learning needs (meds, wound care, etc )  - Arrange for interpretive services to assist at discharge as needed  - Refer to Case Management Department for coordinating discharge planning if the patient needs post-hospital services based on physician/advanced practitioner order or complex needs related to functional status, cognitive ability, or social support system  Outcome: Progressing

## 2022-05-11 NOTE — OCCUPATIONAL THERAPY NOTE
Occupational Therapy Cancellation Note         Patient Name: Denise Rojas  Today's Date: 5/11/2022 05/11/22 1053   Note Type   Note type Cancelled Session   Cancel Reasons Other   Additional Comments OT orders recieved  Chart review completed   Pt is currently recieving HD at bedside, will hold OT eval at this time, will cont to follow     [facility], OTR/L

## 2022-05-11 NOTE — ASSESSMENT & PLAN NOTE
· POA patient's platelet count 78, likely secondary to impaired synthetic function of liver from LOPEZ noted from hospitalization in [place]   · Monitor platelet count

## 2022-05-11 NOTE — ASSESSMENT & PLAN NOTE
· POA patient's ammonia elevated to 167, with signs of mild asterixis and reports of increased forgetfulness per family  · Likely secondary to missed dialysis and underlying cirrhosis likely secondary to LOPEZ per patient's history  · Discontinued lactulose 20 g q 6   · Discontinued rifaximin 550 g q 12  · Monitor for symptom improvement

## 2022-05-11 NOTE — UTILIZATION REVIEW
Initial Clinical Review    Admission: Date/Time/Statement:   Admission Orders (From admission, onward)     Ordered        05/10/22 1612  Inpatient Admission  Once                      Orders Placed This Encounter   Procedures    Inpatient Admission     Standing Status:   Standing     Number of Occurrences:   1     Order Specific Question:   Level of Care     Answer:   Med Surg [16]     Order Specific Question:   Estimated length of stay     Answer:   More than 2 Midnights     Order Specific Question:   Certification     Answer:   I certify that inpatient services are medically necessary for this patient for a duration of greater than two midnights  See H&P and MD Progress Notes for additional information about the patient's course of treatment  ED Arrival Information     Expected   -    Arrival   5/10/2022 13:25    Acuity   Urgent            Means of arrival   Ambulance    Escorted by   [facility]    Service   Hospitalist    Admission type   Urgent            Arrival complaint   weakness           Chief Complaint   Patient presents with    Weakness - Generalized     Pt reports generalized weakness and fatigue x 2 weeks  Pt reports n/v/d since yesterday  Initial Presentation: [de-identified] y o  male PMH of HTN, HLD, paroxysmal atrial fibrillation, BPH, ESRD-HD MWF, s/p aortic dissection repair , esophageal varices , new dx of cirrhosis who presents to ED from home with generalized weakness  x2 weeks with GONZALES and reported increased forgetfulness, 3 days of diarrhea, N/V x 1day and missed dialysis yesterday   On exam, abdominal distention, abdomen soft, non tender  Trace BLE edema, irreg heart rhythm ,signs of mild asterixis  Labs-creat 10 57 from baseline 4-5 , hgb 10 2, elevated AST and alk phos, INR 1 54, plt-78ammonia 167  CXR shows nothing acute  Pt admitted as Inpatient with ESRD, hyper ammonemia, generalized weakness   Plan - nephrology consult with plan for HD tomorrow, , start Lactulose q6h, goal 3 bm/day  Start Rifaximin, monitor ammonia level, PT/OT  onitor platelet count  Nephrology consult- Does not make much urine   L arm AV fistula  Clinically slightly hypervolemic but no urgent indication for HD today  Electrolytes stable, plan for HD tomorrow   Hgb goal 10-11  ContinueEpogen   Continue midodrine for chronic hypotension  Date: 5/11  Day 2:   Nephrology-tolerating today's dialysis  received Midodrine, will monitor BP, asymptomatic, if BP still soft will reduce UF goal and temperature, continue HD schedule via L arm AV fistula  Medicine-feeling better today but been having lot of loose bowel movements which has been ongoing for 1-2 weeks prior to admission  Denies any abdominal pain, nausea, vomiting  Pt with mild pallor and  icteric, dry mucous membranes  Plan - check stool enteric panel and c dif, PT/OT eval, d/c lactulose and rifaximin        ED Triage Vitals   Temperature Pulse Respirations Blood Pressure SpO2   05/10/22 1328 05/10/22 1328 05/10/22 1328 05/10/22 1328 05/10/22 1328   97 9 °F (36 6 °C) 80 16 111/57 98 %      Temp Source Heart Rate Source Patient Position - Orthostatic VS BP Location FiO2 (%)   05/10/22 1328 05/10/22 1328 05/10/22 1400 05/10/22 1615 --   Oral Monitor Lying Right arm       Pain Score       05/10/22 1328       No Pain          Wt Readings from Last 1 Encounters:   05/10/22 80 5 kg (177 lb 7 5 oz)     Additional Vital Signs:   Date/Time Temp Pulse Resp BP MAP (mmHg) SpO2   05/11/22 0930 -- 61 20 95/57 70 90 %   05/11/22 0900 -- 67 20 101/51 68 94 %   05/11/22 0842 -- 52 Abnormal  20 102/57 72 98 %   05/11/22 0830 96 5 °F (35 8 °C) Abnormal  58 20 109/64 79 100 %   05/11/22 07:32:47 97 8 °F (36 6 °C) 74 18 107/72 84 97 %   05/10/22 22:59:52 97 8 °F (36 6 °C) 56 -- 110/64 79 96 %   05/10/22 20:51:13 -- 51 Abnormal  -- 104/58 73 98 %   05/10/22 2014 -- 58 -- 80/57 Abnormal  -- 95 %   05/10/22 1800 -- -- -- -- -- --   05/10/22 1615 98 °F (36 7 °C) 58 18 105/54 72 99 % 05/10/22 1431 -- -- -- -- -- --   05/10/22 1400 -- 82 16 123/67 84 96 %       Pertinent Labs/Diagnostic Test Results:    5/10 ECG-  ECG rate:  70    ECG rate assessment: normal    Rhythm:     Rhythm: atrial fibrillation    Ectopy:     Ectopy: none    QRS:     QRS axis:  Normal    QRS intervals:  Normal  Conduction:     Conduction: normal    ST segments:     ST segments:  Normal  T waves:     T waves: flattening    US right upper quadrant with liver dopplers   Final Result by Kristofer Cabrales MD (05/11 5685)      Nodular hepatic contour in keeping with cirrhosis  Bidirectional flow noted within the portal vein  Small volume of ascites  Gallbladder sludge and nonspecific gallbladder wall thickening in the presence of ascites  Workstation performed: [id number]         XR chest 1 view portable   Final Result by Edy Ramirez MD (05/10 1618)      No acute cardiopulmonary disease                    Workstation performed: [id number]           Results from last 7 days   Lab Units 05/10/22  1421   SARS-COV-2  Negative     Results from last 7 days   Lab Units 05/11/22  0535 05/10/22  1421   WBC Thousand/uL 5 18 4 57   HEMOGLOBIN g/dL 9 2* 10 2*   HEMATOCRIT % 27 8* 30 5*   PLATELETS Thousands/uL 70* 78*   NEUTROS ABS Thousands/µL  --  3 42         Results from last 7 days   Lab Units 05/11/22  0756 05/10/22  1421   SODIUM mmol/L 138 138   POTASSIUM mmol/L 4 7 5 3   CHLORIDE mmol/L 104 103   CO2 mmol/L 20* 24   ANION GAP mmol/L 14* 11   BUN mg/dL 71* 64*   CREATININE mg/dL 11 19* 10 57*   EGFR ml/min/1 73sq m 3 4   CALCIUM mg/dL 8 9 8 6     Results from last 7 days   Lab Units 05/11/22  0535 05/10/22  1421   AST U/L 35 42*   ALT U/L 20 25   ALK PHOS U/L 86 107*   TOTAL PROTEIN g/dL 6 6 7 5   ALBUMIN g/dL 2 8* 3 0*   TOTAL BILIRUBIN mg/dL 1 12* 1 20*   BILIRUBIN DIRECT mg/dL 0 27*  --    AMMONIA umol/L  --  167*         Results from last 7 days   Lab Units 05/11/22  0756 05/10/22  1421   GLUCOSE RANDOM mg/dL 84 106       Results from last 7 days   Lab Units 05/10/22  1933 05/10/22  1614 05/10/22  1421   HS TNI 0HR ng/L  --   --  54*   HS TNI 2HR ng/L  --  48  --    HSTNI D2 ng/L  --  -6  --    HS TNI 4HR ng/L 47  --   --    HSTNI D4 ng/L -7  --   --          Results from last 7 days   Lab Units 05/10/22  1421   PROTIME seconds 18 4*   INR  1 54*   PTT seconds 45*                         Results from last 7 days   Lab Units 05/10/22  1933   NT-PRO BNP pg/mL 22,047*             Results from last 7 days   Lab Units 05/10/22  1421   LIPASE u/L 35                     Results from last 7 days   Lab Units 05/10/22  1421   INFLUENZA A PCR  Negative   INFLUENZA B PCR  Negative   RSV PCR  Negative                 ED Treatment:   Medication Administration from 05/10/2022 1325 to 05/10/2022 1709     None        Past Medical History:   Diagnosis Date    Acute renal failure superimposed on stage 4 chronic kidney disease ([place] [address] ) 10/4/2020    Arthritis     BPH without urinary obstruction     CKD (chronic kidney disease), stage III (HCC)     baseline 1 6-1 7    Dialysis patient ([place] [address] )     GERD (gastroesophageal reflux disease)     Hyperlipidemia     Hypertension     MI (myocardial infarction) ([place] [address] )      Present on Admission:   Benign essential hypertension   ESRD (end stage renal disease) ([place] [address] )   Hyperlipidemia   Paroxysmal atrial fibrillation (HCC)   Thrombocytopenia (HCC)      Admitting Diagnosis: Hyperammonemia ([place] [address] ) [E72 20]  Weakness [R53 1]  ESRD needing dialysis ([place] [address] ) [N18 6, Z99 2]  Listlessness [R53 83]  Age/Sex: [de-identified] y o  male  Admission Orders:  Scheduled Medications:  apixaban, 2 5 mg, Oral, BID  atorvastatin, 40 mg, Oral, Daily  doxercalciferol, 3 mcg, Intravenous, Once per day on Mon Wed Fri  epoetin uday, 1,800 Units, Intravenous, Once per day on Mon Wed Fri  gabapentin, 100 mg, Oral, HS  iron sucrose, 50 mg, Intravenous, Weekly  lactulose, 20 g, Oral, Q8H  metoprolol succinate, 25 mg, Oral, BID  midodrine, 5 mg, Oral, TID AC  pantoprazole, 40 mg, Oral, Early Morning  rifaximin, 550 mg, Oral, Q12H FILIBERTO    lactulose oral solution 20 g  Dose: 20 g  Freq: Every 6 hours Route: PO  Indications of Use: HEPATIC ENCEPHALOPATHY  Start: 05/10/22 1900 End: 05/11/22 0618  Continuous IV Infusions:     PRN Meds:  acetaminophen, 650 mg, Oral, Q6H PRN  midodrine (PROAMATINE) tablet 5 mg  Dose: 5 mg  Freq: Demand Route: PO  PRN Comment: Administer during hemodialysis to prevent hypotension  Indications of Use: INTRADIALYTIC HYPOTENSION  Start: 05/11/22 0908 End: 05/11/22 0921 x1 5/11 @0921    telemetry  renal diet, 1800 ml FR      Network Utilization Review Department  ATTENTION: Please call with any questions or concerns to [phone] and carefully listen to the prompts so that you are directed to the right person  All voicemails are confidential   Kendra Sotomayor all requests for admission clinical reviews, approved or denied determinations and any other requests to dedicated fax number below belonging to the campus where the patient is receiving treatment   List of dedicated fax numbers for the Facilities:  [address] DENIALS (Administrative/Medical Necessity) [phone]   [address] (Maternity/NICU/Pediatrics) [phone]   [address]  [address] [facility] [place] [address] [address] [place] [address] [address] [place] [address] [facility] [phone]

## 2022-05-11 NOTE — PLAN OF CARE
Will attempt for UF of 2-3 kg as tolerated  On 2 K bath for serum K-4 7  No c/o SOB or any pain  Problem: METABOLIC, FLUID AND ELECTROLYTES - ADULT  Goal: Electrolytes maintained within normal limits  Description: INTERVENTIONS:  - Monitor labs and assess patient for signs and symptoms of electrolyte imbalances  - Administer electrolyte replacement as ordered  - Monitor response to electrolyte replacements, including repeat lab results as appropriate  - Instruct patient on fluid and nutrition as appropriate  Outcome: Progressing  Goal: Fluid balance maintained  Description: INTERVENTIONS:  - Monitor labs   - Monitor I/O and WT  - Instruct patient on fluid and nutrition as appropriate  - Assess for signs & symptoms of volume excess or deficit  Outcome: Progressing   Post-Dialysis RN Treatment Note    Blood Pressure:  Pre 102/57 mm/Hg  Post 98/56 mmHg   EDW  81 5 kg    Weight:  Pre 83 4 kg   Post 81 4 kg   Mode of weight measurement: Standing Scale   Volume Removed  2000 ml    Treatment duration 240 minutes    NS given  No    Treatment shortened?  No   Medications given during Rx Hectoral 3 mcg, Epogen 1,800 units, Venofer 50 mg, Midodrine 5 mg x2   Estimated Kt/V  None Reported   Access type: AV graft   Access Issues: No    Report called to primary nurse   Yes        Archie Cai RN

## 2022-05-11 NOTE — PROCEDURES
NEPHROLOGY DIALYSIS PROCEDURE NOTE      Patient seen and examined on Hemodialysis, tolerating procedure well  All documentation, labs, medications were reviewed by myself, and the treatment plan was reviewed with nurse and patient  Seen on Dialysis at : 9:45 AM  Dialysis Access: Left Arm AV Fistula  Vitals: 91/60   Dialysis time: 4 hours  Dialyzer: F160  Sodium bath: 138  Potassium bath: 2 K+  Bicarbonate bath: 35  Calcium bath: 2 5  Ultrafiltration: 2-2 5 L  Blood flow rate: 350 cc/min  Dialysis flow rate: 1 5 X Qb--> 500  Dialysis temperature: 36C  Medications given on HD: Midodrine    Assessment/Plan:    End Stage Renal Disease  -Modality:In-Center Hemodialysis  -Schedule: Monday/Wednesday/Friday  -Dialysis Unit: [facility] [address]  -Access: Left Arm AV Fistula  -Presciption: reviewed  -Status: stable, 2 5 kg above EDW, BP soft  -Plan: received Midodrine, will monitor BP, asymptomatic, if BP still soft will reduce UF goal and temperature, continue HD schedule    Anemia  - Epogen with HD    Mineral bone disorder-chronic kidney disease  - hectoral 3 mcg, renal diet, not on phos binder    Blood pressure/volume status  - + diarrhea, BP soft    Review of Systems: The entire 12 point ROS has been reviewed      Physical Exam:    General: awake, NAD  Skin: no rash  CVS: S1S2+ RRR  Lungs: clear  Abdomen: non-tender, soft  Access: Left Arm AVF, + bruit/thrill  Extremities: no edema  Neuro: no asterixis          Current Facility-Administered Medications:     acetaminophen (TYLENOL) tablet 650 mg, 650 mg, Oral, Q6H PRN, Ana Burr MD    apixaban Henreitta Lock) tablet 2 5 mg, 2 5 mg, Oral, BID, Ana Burr MD, 2 5 mg at 05/10/22 2010    atorvastatin (LIPITOR) tablet 40 mg, 40 mg, Oral, Daily, Ana Burr MD    doxercalciferol (HECTOROL) injection 3 mcg, 3 mcg, Intravenous, Once per day on Mon Wed Fri, EL Cavanaugh, 3 mcg at 05/11/22 0909    epoetin uday (EPOGEN,PROCRIT) injection 1,800 Units, 1,800 Units, Intravenous, Once per day on Mon Wed Fri, EL Bowden, 1,800 Units at 05/11/22 2638    gabapentin (NEURONTIN) capsule 100 mg, 100 mg, Oral, HS, Julian Irby MD, 100 mg at 05/10/22 2203    iron sucrose (VENOFER) 50 mg in sodium chloride 0 9 % 100 mL IVPB, 50 mg, Intravenous, Weekly, EL Bowden    lactulose oral solution 20 g, 20 g, Oral, Q8H, Julian Irby MD    metoprolol succinate (TOPROL-XL) 24 hr tablet 25 mg, 25 mg, Oral, BID, Julian Irby MD    midodrine (PROAMATINE) tablet 5 mg, 5 mg, Oral, TID AC, Julian Irby MD, 5 mg at 05/11/22 0617    pantoprazole (PROTONIX) EC tablet 40 mg, 40 mg, Oral, Early Morning, Julian Irby MD, 40 mg at 05/11/22 0617    rifaximin (XIFAXAN) tablet 550 mg, 550 mg, Oral, Q12H [facility], Julian Irby MD, 550 mg at 05/10/22 2010

## 2022-05-11 NOTE — ASSESSMENT & PLAN NOTE
Lab Results   Component Value Date    EGFR 3 05/11/2022    EGFR 4 05/10/2022    EGFR 9 05/03/2022    CREATININE 11 19 (H) 05/11/2022    CREATININE 10 57 (H) 05/10/2022    CREATININE 5 22 (H) 05/03/2022     · Patient has end-stage renal disease and is no longer making urine  · Patient's creatinine baseline appears to be 4-5  · POA patient's creatinine 10 57  · Of note patient missed dialysis yesterday  · Nephrology is on board and completed dialysis today

## 2022-05-11 NOTE — ASSESSMENT & PLAN NOTE
· Patient reports generalized weakness for the last 2 weeks  · Patient's son at bedside reports since patient was started on dialysis a year ago he has had increased weakness  · Likely secondary to chronic deconditioning  · Follow up PT/OT recommendations

## 2022-05-11 NOTE — PROGRESS NOTES
[facility]  Progress Note - Philippe SEBASTIAN Core 1940, [de-identified] y o  male MRN: [id number]  Unit/Bed#: S -01 Encounter: [id number]  Primary Care Provider: Leena Gardiner   Date and time admitted to hospital: 5/10/2022  1:30 PM    * ESRD (end stage renal disease) [facility])  Assessment & Plan  Lab Results   Component Value Date    EGFR 3 05/11/2022    EGFR 4 05/10/2022    EGFR 9 05/03/2022    CREATININE 11 19 (H) 05/11/2022    CREATININE 10 57 (H) 05/10/2022    CREATININE 5 22 (H) 05/03/2022     · Patient has end-stage renal disease and is no longer making urine  · Patient's creatinine baseline appears to be 4-5  · POA patient's creatinine 10 57  · Of note patient missed dialysis yesterday  · Nephrology is on board and completed dialysis today       Hyperammonemia ([facility] [address] )  Assessment & Plan  · POA patient's ammonia elevated to 167, with signs of mild asterixis and reports of increased forgetfulness per family  · Likely secondary to missed dialysis and underlying cirrhosis likely secondary to LOPEZ per patient's history  · Discontinued lactulose 20 g q 6   · Discontinued rifaximin 550 g q 12  · Monitor for symptom improvement     Generalized weakness  Assessment & Plan  · Patient reports generalized weakness for the last 2 weeks  · Patient's son at bedside reports since patient was started on dialysis a year ago he has had increased weakness  · Likely secondary to chronic deconditioning  · Follow up PT/OT recommendations     Paroxysmal atrial fibrillation ([address] )  Assessment & Plan  · Continue home Toprol XL 25 mg b i d   · Patient currently in atrial fibrillation with well-controlled rates    Hyperlipidemia  Assessment & Plan  · Continue home Lipitor 40 mg daily    Thrombocytopenia (HCC)  Assessment & Plan  · POA patient's platelet count 78, likely secondary to impaired synthetic function of liver from LOPEZ noted from hospitalization in [place]   · Monitor platelet count      Benign essential hypertension  Assessment & Plan  · Patient has a past medical history significant for hypertension  · Continue home medications Toprol XL 25 mg b i d           VTE Pharmacologic Prophylaxis: VTE Score: 4 Moderate Risk (Score 3-4) - Pharmacological DVT Prophylaxis Ordered: apixaban (Eliquis)  Patient Centered Rounds: I performed bedside rounds with nursing staff today  Discussions with Specialists or Other Care Team Provider: Nephrology    Education and Discussions with Family / Patient: Updated  (daughter) via phone  Current Length of Stay: 1 day(s)  Current Patient Status: Inpatient   Discharge Plan: Anticipate discharge in 24-48 hrs to home  Code Status: Level 3 - DNAR and DNI    Subjective:   No acute events overnight  Patient has no complaints this morning and states he feels much better  Objective:     Vitals:   Temp (24hrs), Av 6 °F (36 4 °C), Min:96 5 °F (35 8 °C), Max:98 °F (36 7 °C)    Temp:  [96 5 °F (35 8 °C)-98 °F (36 7 °C)] 96 5 °F (35 8 °C)  HR:  [51-82] 70  Resp:  [16-20] 18  BP: ()/(49-72) 98/56  SpO2:  [90 %-100 %] 98 %  Body mass index is 27 8 kg/m²  Input and Output Summary (last 24 hours): Intake/Output Summary (Last 24 hours) at 2022 1256  Last data filed at 2022 1244  Gross per 24 hour   Intake 200 ml   Output 2500 ml   Net -2300 ml       Physical Exam:   Physical Exam  Vitals and nursing note reviewed  Constitutional:       Appearance: He is well-developed  HENT:      Head: Normocephalic and atraumatic  Eyes:      Conjunctiva/sclera: Conjunctivae normal    Cardiovascular:      Rate and Rhythm: Normal rate and regular rhythm  Heart sounds: No murmur heard  Pulmonary:      Effort: Pulmonary effort is normal  No respiratory distress  Breath sounds: Normal breath sounds  Abdominal:      General: There is distension  Palpations: Abdomen is soft  Tenderness: There is no abdominal tenderness  There is no guarding  Musculoskeletal:      Cervical back: Neck supple  Right lower leg: Edema (trace) present  Left lower leg: Edema (trace) present  Skin:     General: Skin is warm and dry  Neurological:      Mental Status: He is alert  Additional Data:     Labs:  Results from last 7 days   Lab Units 05/11/22  0535 05/10/22  1421   WBC Thousand/uL 5 18 4 57   HEMOGLOBIN g/dL 9 2* 10 2*   HEMATOCRIT % 27 8* 30 5*   PLATELETS Thousands/uL 70* 78*   NEUTROS PCT %  --  75   LYMPHS PCT %  --  12*   MONOS PCT %  --  11   EOS PCT %  --  1     Results from last 7 days   Lab Units 05/11/22  0756 05/11/22  0535   SODIUM mmol/L 138  --    POTASSIUM mmol/L 4 7  --    CHLORIDE mmol/L 104  --    CO2 mmol/L 20*  --    BUN mg/dL 71*  --    CREATININE mg/dL 11 19*  --    ANION GAP mmol/L 14*  --    CALCIUM mg/dL 8 9  --    ALBUMIN g/dL  --  2 8*   TOTAL BILIRUBIN mg/dL  --  1 12*   ALK PHOS U/L  --  86   ALT U/L  --  20   AST U/L  --  35   GLUCOSE RANDOM mg/dL 84  --      Results from last 7 days   Lab Units 05/10/22  1421   INR  1 54*                   Lines/Drains:  Invasive Devices  Report    Central Venous Catheter Line  Duration           CVC Central Lines 02/15/21 Double 449 days          Peripheral Intravenous Line  Duration           Peripheral IV 05/10/22 Right Antecubital 1 day          Line  Duration           Hemodialysis AV Fistula 03/26/21 Left Forearm 411 days          Hemodialysis Catheter  Duration           HD Permanent Double Catheter 449 days                Central Line:  Goal for removal: Will discontinue when meds requiring line are completed           Telemetry:  Telemetry Orders (From admission, onward)             24 Hour Telemetry Monitoring  Continuous x 24 Hours (Telem)        Expiring   References:    Telemetry Guidelines   Question:  Reason for 24 Hour Telemetry  Answer:  Metabolic/Electrolyte Disturbance with High Probability of Dysrhythmia (K level <3 or >6, or KCL infusion >=10mEq/hr) Telemetry Reviewed: Atrial fibrillation  HR averaging 40  Indication for Continued Telemetry Use: Metabolic/electrolyte disturbance with high probability of dysrhythmia             Imaging: No pertinent imaging reviewed  Recent Cultures (last 7 days):         Last 24 Hours Medication List:   Current Facility-Administered Medications   Medication Dose Route Frequency Provider Last Rate    acetaminophen  650 mg Oral Q6H PRN Clement Guerin MD      apixaban  2 5 mg Oral BID Clement Guerin MD      atorvastatin  40 mg Oral Daily Clement Guerin MD      doxercalciferol  3 mcg Intravenous Once per day on Mon Wed Fri EL Thorpe      epoetin uday  1,800 Units Intravenous Once per day on Mon Wed Fri CarmenEL Gross      gabapentin  100 mg Oral HS Clement Guerin MD      iron sucrose  50 mg Intravenous Weekly Barbie Funk, Aziza Molina      metoprolol succinate  25 mg Oral BID Clement Guerin MD      midodrine  5 mg Oral TID [facility] Clement Guerin MD      pantoprazole  40 mg Oral Early Morning Clement Guerin MD          Today, Patient Was Seen By: Clement Guerin MD    **Please Note: This note may have been constructed using a voice recognition system  **

## 2022-05-11 NOTE — PHYSICAL THERAPY NOTE
Physical Therapy Cancellation Note           05/11/22 1052   PT Last Visit   PT Visit Date 05/11/22   Note Type   Note type Cancelled Session; Evaluation   Cancel Reasons Other   Additional Comments referral received for PT eval and tx  attempted to see pt for eval but he is receiving dialysis at bedside  will follow and initiate PT as appropriate       Guillermina Little, PT

## 2022-05-12 PROBLEM — K72.00 ACUTE HEPATIC ENCEPHALOPATHY: Status: ACTIVE | Noted: 2022-01-01

## 2022-05-12 PROBLEM — K76.82 ACUTE HEPATIC ENCEPHALOPATHY: Status: ACTIVE | Noted: 2022-01-01

## 2022-05-12 NOTE — PLAN OF CARE
Problem: PHYSICAL THERAPY ADULT  Goal: Performs mobility at highest level of function for planned discharge setting  See evaluation for individualized goals  Description: Treatment/Interventions: Functional transfer training, LE strengthening/ROM, Therapeutic exercise, Endurance training, Patient/family training, Equipment eval/education, Gait training          See flowsheet documentation for full assessment, interventions and recommendations  Note: Prognosis: Fair  Problem List: Decreased strength, Decreased range of motion, Decreased endurance, Impaired balance, Decreased mobility, Decreased safety awareness, Pain  Assessment: Pt presents with generalized weakness and missed dialysis  Dx: ESRD, hyperammonemia, generalized weakness, thrombocytopenia, and HTN  order placed for PT eval and tx  pt presents w/ comorbidities of HTN, hyperlipidemia, MI, ESRD, and CKD and personal factors of advanced age and preferred language not [place] (language barrier)  pt presents w/ weakness, decreased ROM, decreased endurance, impaired balance, gait deviations, decreased safety awareness and fall risk  these impairments are evident in findings from physical examination (weakness and decreased ROM), mobility assessment (need for standby assist w/ all phases of mobility when usually mobilizing independently, tolerance to only 120 feet of ambulation and need for cueing for mobility technique), and Comfortable Gait Speed: 0 43 m/s (less than 1 0 m/s indicates need for intervention to address falls risk) and 4 Item Dynamic Gait Index: 7/12 (less than 10 indicates fall risk)  pt needed input for task focus and mobility technique  pt is at risk for falls due to physical and safety awareness deficits   pt's clinical presentation is unstable/unpredictable (evident in poor blood pressure control, need for assist w/ all phases of mobility when usually mobilizing independently, tolerance to only 120 feet of ambulation, pain impacting overall mobility status and need for input for mobility technique/safety)  pt needs inpatient PT tx to improve mobility deficits and progress mobility training as appropriate  discharge recommendation is for home w/ family support and home PT to reduce fall risk and maximize level of functional independence  PT Discharge Recommendation: Home with home health rehabilitation          See flowsheet documentation for full assessment

## 2022-05-12 NOTE — CASE MANAGEMENT
Case Management Discharge Planning Note    Patient name Jake Mcgill  Location S /S -01 MRN [id number]  : 1940 Date 2022       Current Admission Date: 5/10/2022  Current Admission Diagnosis:ESRD (end stage renal disease) [facility])   Patient Active Problem List    Diagnosis Date Noted    Acute hepatic encephalopathy 2022    Hyperammonemia ([address] ) 05/10/2022    Generalized weakness 05/10/2022    Atrial fibrillation with rapid ventricular response ([address] ) 2022    Hypotension 2022    Status post aortic dissection repair 2022    Hepatic cirrhosis ([address] ) 2022    Rotator cuff arthropathy, left 2022    Hypertensive heart disease with heart failure ([address] ) 2021    Coagulopathy ([address] ) 2021    Chronic diastolic CHF (congestive heart failure) ([address] ) 2021    GI bleed 2021    Anemia 2021    Candidal intertrigo 2021    Vision loss 2021    Status post placement of arteriovenous graft 2021    ESRD (end stage renal disease) ([address] )     Insomnia 2021    Rheumatoid arthritis ([address] ) 2021    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 ([address] ) 2020    Chronic kidney disease-mineral and bone disorder 2020    Acute on chronic diastolic (congestive) heart failure ([address] ) 10/04/2020    GONZALES (dyspnea on exertion) 2020    Paroxysmal atrial fibrillation ([address] ) 2020    Monoclonal gammopathy 2020    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    Encounter for post surgical wound check 2020    Sleep disorder 2020    Hyperlipidemia     Complication of vascular dialysis catheter 2020    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 2019    Hyperphosphatemia 2019    Anticoagulation goal of INR 2 to 3 2019    Thrombocytopenia ([address] ) 2019    Dissection of thoracic aorta ([address] ) 12/15/2019    S/P aorta repair 12/15/2019    Bradycardia 04/23/2019    Benign prostatic hyperplasia without lower urinary tract symptoms 01/09/2019    Lumbar pain 11/11/2018    Overweight (BMI 25 0-29 9) 02/23/2018    Rotator cuff tear arthropathy, right 12/05/2017    Secondary osteoarthritis of right shoulder 12/05/2017    Benign essential hypertension 10/03/2012      LOS (days): 2  Geometric Mean LOS (GMLOS) (days): 4 30  Days to GMLOS:2 3     OBJECTIVE:  Risk of Unplanned Readmission Score: 28 09         Current admission status: Inpatient   Preferred Pharmacy:   [address], [address] [place], [place] [address]  Phone: [phone] Fax: [address], [address] [facility] [address]  Phone: [phone] Fax: [phone]    Primary Care Provider: EL Julien    Primary Insurance: [facility]  Secondary Insurance: [address] [place] [address]    DISCHARGE DETAILS:    Discharge planning discussed with[de-identified] patient  Freedom of Choice: Yes  Comments - Freedom of Choice: CM discussed the PT/OT recommendation for Home with Home Healthcare PT  Patient is in agreement and chooses referral to any available agency that accepts his insurance  Spouse and spouse's home health aide present  Patient gets dialysis MWF at [address]  CM contacted family/caregiver?: Yes  Were Treatment Team discharge recommendations reviewed with patient/caregiver?: Yes  Did patient/caregiver verbalize understanding of patient care needs?: Yes  Were patient/caregiver advised of the risks associated with not following Treatment Team discharge recommendations?: Yes    Contacts  Patient Contacts: wife and caregiver at bedside  Relationship to Patient[de-identified] Family  Contact Method:  In Person  Reason/Outcome: Continuity of Care, Emergency Contact, Discharge [address]         Is the patient interested in [address] at discharge?: Yes  [address] requested[de-identified] Physical [address] Name[de-identified] [address] [facility] Provider[de-identified] PCP  Home Health Services Needed[de-identified] Evaluate Functional Status and Safety, Strengthening/Theraputic Exercises to Improve Function  Homebound Criteria Met[de-identified] Immunosuppressed, Uses an Assist Device (i e  cane, walker, etc)  Supporting Clincal Findings[de-identified] Limited Endurance, Fatigues Easliy in Short Distances    DME Referral Provided  Referral made for DME?: No    Other Referral/Resources/Interventions Provided:  Referral Comments: Referral made to [facility] for PT    Awaiting response on acceptance

## 2022-05-12 NOTE — PROGRESS NOTES
[facility]  Progress Note - Jose SEBASTIAN Core 1940, [de-identified] y o  male MRN: [id number]  Unit/Bed#: S -01 Encounter: [id number]  Primary Care Provider: Vaibhav Diana   Date and time admitted to hospital: 5/10/2022  1:30 PM    * ESRD (end stage renal disease) [facility])  Assessment & Plan  Lab Results   Component Value Date    EGFR 6 05/12/2022    EGFR 3 05/11/2022    EGFR 4 05/10/2022    CREATININE 7 42 (H) 05/12/2022    CREATININE 11 19 (H) 05/11/2022    CREATININE 10 57 (H) 05/10/2022     · Patient has end-stage renal disease and is no longer making urine  · Patient's creatinine baseline appears to be 4-5  · POA patient's creatinine 10 57  · Of note patient missed dialysis yesterday  · Nephrology is on board and completed dialysis yesterday, and is okay with him going back to his regular MWF to receive dialysis as an outpatient Friday      Hyperammonemia ([address] )  Assessment & Plan  · POA patient's ammonia elevated to 167, with signs of mild asterixis and reports of increased forgetfulness per family  · Likely secondary to missed dialysis and underlying cirrhosis likely secondary to LOPEZ per patient's history  · Resolved     Acute hepatic encephalopathy  Assessment & Plan  · POA: Patient somnolent and demonstrating mild asterixis, forgetfulness and ammonia level 167 likely secondary to LOPEZ   · Monitored telemetry     Generalized weakness  Assessment & Plan  · Patient reports generalized weakness for the last 2 weeks  · Patient's son at bedside reports since patient was started on dialysis a year ago he has had increased weakness  · Likely secondary to chronic deconditioning  · Follow up PT/OT recommendations     Paroxysmal atrial fibrillation (HCC)  Assessment & Plan  · Continue home Toprol XL 25 mg b i d   · Patient currently in atrial fibrillation with well-controlled rates    Benign essential hypertension  Assessment & Plan  · Patient has a past medical history significant for hypertension  · Continue home medications Toprol XL 25 mg b i d  Hyperlipidemia  Assessment & Plan  · Continue home Lipitor 40 mg daily    Thrombocytopenia (HCC)  Assessment & Plan  · POA patient's platelet count 78, likely secondary to impaired synthetic function of liver from LOPEZ noted from hospitalization in [place]   · Monitor platelet count            VTE Pharmacologic Prophylaxis: VTE Score: 4 Moderate Risk (Score 3-4) - Pharmacological DVT Prophylaxis Ordered: apixaban (Eliquis)  Patient Centered Rounds: I performed bedside rounds with nursing staff today  Discussions with Specialists or Other Care Team Provider: Nephrology    Education and Discussions with Family / Patient: Updated  (daughter) via phone  Current Length of Stay: 2 day(s)  Current Patient Status: Inpatient   Discharge Plan: Anticipate discharge in 24-48 hrs to home  Code Status: Level 3 - DNAR and DNI    Subjective:   No acute events overnight  Patient states he is feeling better today  He states he no longer feels weak  He says his diarrhea has resolved as well and had a bowel movement last night that was normal   He denies fever, chills, nausea, vomiting, chest pain, shortness a breath  Objective:     Vitals:   Temp (24hrs), Av 1 °F (36 7 °C), Min:97 9 °F (36 6 °C), Max:98 2 °F (36 8 °C)    Temp:  [97 9 °F (36 6 °C)-98 2 °F (36 8 °C)] 97 9 °F (36 6 °C)  HR:  [65-82] 82  BP: ()/(44-71) 105/71  SpO2:  [93 %-98 %] 98 %  Body mass index is 27 8 kg/m²  Input and Output Summary (last 24 hours): Intake/Output Summary (Last 24 hours) at 2022 1336  Last data filed at 2022 0901  Gross per 24 hour   Intake --   Output 200 ml   Net -200 ml       Physical Exam:   Physical Exam  Vitals and nursing note reviewed  Constitutional:       General: He is not in acute distress  Appearance: He is well-developed  He is not ill-appearing  HENT:      Head: Normocephalic and atraumatic  Mouth/Throat:      Mouth: Mucous membranes are dry  Eyes:      Conjunctiva/sclera: Conjunctivae normal    Cardiovascular:      Rate and Rhythm: Normal rate  Rhythm irregular  Heart sounds: No murmur heard  Pulmonary:      Effort: Pulmonary effort is normal  No respiratory distress  Breath sounds: Normal breath sounds  Abdominal:      General: There is distension (mild)  Palpations: Abdomen is soft  Tenderness: There is no abdominal tenderness  There is no guarding  Musculoskeletal:      Cervical back: Neck supple  Right lower leg: Edema (trace) present  Left lower leg: Edema (trace) present  Skin:     General: Skin is warm and dry  Coloration: Skin is pale  Neurological:      Mental Status: He is alert  Additional Data:     Labs:  Results from last 7 days   Lab Units 05/12/22  0506 05/11/22  0535 05/10/22  1421   WBC Thousand/uL 4 45   < > 4 57   HEMOGLOBIN g/dL 9 1*   < > 10 2*   HEMATOCRIT % 27 4*   < > 30 5*   PLATELETS Thousands/uL 70*   < > 78*   NEUTROS PCT %  --   --  75   LYMPHS PCT %  --   --  12*   MONOS PCT %  --   --  11   EOS PCT %  --   --  1    < > = values in this interval not displayed  Results from last 7 days   Lab Units 05/12/22  0506 05/11/22  0756 05/11/22  0535   SODIUM mmol/L 137   < >  --    POTASSIUM mmol/L 3 9   < >  --    CHLORIDE mmol/L 99   < >  --    CO2 mmol/L 30   < >  --    BUN mg/dL 37*   < >  --    CREATININE mg/dL 7 42*   < >  --    ANION GAP mmol/L 8   < >  --    CALCIUM mg/dL 8 4   < >  --    ALBUMIN g/dL  --   --  2 8*   TOTAL BILIRUBIN mg/dL  --   --  1 12*   ALK PHOS U/L  --   --  86   ALT U/L  --   --  20   AST U/L  --   --  35   GLUCOSE RANDOM mg/dL 78   < >  --     < > = values in this interval not displayed       Results from last 7 days   Lab Units 05/10/22  1421   INR  1 54*                   Lines/Drains:  Invasive Devices  Report    Central Venous Catheter Line  Duration           CVC Central Lines 02/15/21 Double 450 days          Peripheral Intravenous Line  Duration           Peripheral IV 05/10/22 Right Antecubital 2 days          Line  Duration           Hemodialysis AV Fistula 03/26/21 Left Forearm 412 days          Hemodialysis Catheter  Duration           HD Permanent Double Catheter 450 days                Central Line:  Goal for removal: Will discontinue when meds requiring line are completed  Telemetry:  Telemetry Orders (From admission, onward)             48 Hour Telemetry Monitoring  Continuous x 48 hours        References:    Telemetry Guidelines   Question:  Reason for 48 Hour Telemetry  Answer:  Arrhythmias Requiring Medical Therapy (eg  SVT, Vtach/fib, Bradycardia, Uncontrolled A-fib)                 Telemetry Reviewed: No significant events recorded  Indication for Continued Telemetry Use: Metabolic/electrolyte disturbance with high probability of dysrhythmia             Imaging: No pertinent imaging reviewed  Recent Cultures (last 7 days):         Last 24 Hours Medication List:   Current Facility-Administered Medications   Medication Dose Route Frequency Provider Last Rate    acetaminophen  650 mg Oral Q6H PRN Josse Miranda MD      apixaban  2 5 mg Oral BID Josse Miranda MD      atorvastatin  40 mg Oral Daily Josse Miranda MD      doxercalciferol  3 mcg Intravenous Once per day on Mon Wed Fri EL Wade      epoetin uday  1,800 Units Intravenous Once per day on Mon Wed Fri EL Chilel      gabapentin  100 mg Oral HS Josse Miranda MD      iron sucrose  50 mg Intravenous Weekly Barbie Jaci Funk,  [address]      metoprolol succinate  25 mg Oral BID Josse Miranda MD      midodrine  5 mg Oral TID [facility] Josse Miranda MD      pantoprazole  40 mg Oral Early Morning Josse Miranda MD          Today, Patient Was Seen By: Josse Miranda MD    **Please Note: This note may have been constructed using a voice recognition system  **

## 2022-05-12 NOTE — ASSESSMENT & PLAN NOTE
· POA patient's ammonia elevated to 167, with signs of mild asterixis and reports of increased forgetfulness per family  · Likely secondary to missed dialysis and underlying cirrhosis likely secondary to LOPEZ per patient's history  · Resolved

## 2022-05-12 NOTE — PLAN OF CARE
Problem: Potential for Falls  Goal: Patient will remain free of falls  Description: INTERVENTIONS:  - Educate patient/family on patient safety including physical limitations  - Instruct patient to call for assistance with activity   - Consult OT/PT to assist with strengthening/mobility   - Keep Call bell within reach  - Keep bed low and locked with side rails adjusted as appropriate  - Keep care items and personal belongings within reach  - Initiate and maintain comfort rounds  - Make Fall Risk Sign visible to staff  - Offer Toileting every  Hours, in advance of need  - Initiate/Maintain alarm  - Obtain necessary fall risk management equipment  - Apply yellow socks and bracelet for high fall risk patients  - Consider moving patient to room near nurses station  Outcome: Progressing     Problem: METABOLIC, FLUID AND ELECTROLYTES - ADULT  Goal: Electrolytes maintained within normal limits  Description: INTERVENTIONS:  - Monitor labs and assess patient for signs and symptoms of electrolyte imbalances  - Administer electrolyte replacement as ordered  - Monitor response to electrolyte replacements, including repeat lab results as appropriate  - Instruct patient on fluid and nutrition as appropriate  Outcome: Progressing  Goal: Fluid balance maintained  Description: INTERVENTIONS:  - Monitor labs   - Monitor I/O and WT  - Instruct patient on fluid and nutrition as appropriate  - Assess for signs & symptoms of volume excess or deficit  Outcome: Progressing     Problem: DISCHARGE PLANNING  Goal: Discharge to home or other facility with appropriate resources  Description: INTERVENTIONS:  - Identify barriers to discharge w/patient and caregiver  - Arrange for needed discharge resources and transportation as appropriate  - Identify discharge learning needs (meds, wound care, etc )  - Arrange for interpretive services to assist at discharge as needed  - Refer to Case Management Department for coordinating discharge planning if the patient needs post-hospital services based on physician/advanced practitioner order or complex needs related to functional status, cognitive ability, or social support system  Outcome: Progressing     Problem: MOBILITY - ADULT  Goal: Maintain or return to baseline ADL function  Description: INTERVENTIONS:  -  Assess patient's ability to carry out ADLs; assess patient's baseline for ADL function and identify physical deficits which impact ability to perform ADLs (bathing, care of mouth/teeth, toileting, grooming, dressing, etc )  - Assess/evaluate cause of self-care deficits   - Assess range of motion  - Assess patient's mobility; develop plan if impaired  - Assess patient's need for assistive devices and provide as appropriate  - Encourage maximum independence but intervene and supervise when necessary  - Involve family in performance of ADLs  - Assess for home care needs following discharge   - Consider OT consult to assist with ADL evaluation and planning for discharge  - Provide patient education as appropriate  Outcome: Progressing  Goal: Maintains/Returns to pre admission functional level  Description: INTERVENTIONS:  - Perform BMAT or MOVE assessment daily    - Set and communicate daily mobility goal to care team and patient/family/caregiver  - Collaborate with rehabilitation services on mobility goals if consulted  - Perform Range of Motion  times a day  - Reposition patient every  hours    - Dangle patient  times a day  - Stand patient  times a day  - Ambulate patient  times a day  - Out of bed to chair  times a day   - Out of bed for meals  times a day  - Out of bed for toileting  - Record patient progress and toleration of activity level   Outcome: Progressing

## 2022-05-12 NOTE — ASSESSMENT & PLAN NOTE
Lab Results   Component Value Date    EGFR 6 05/12/2022    EGFR 3 05/11/2022    EGFR 4 05/10/2022    CREATININE 7 42 (H) 05/12/2022    CREATININE 11 19 (H) 05/11/2022    CREATININE 10 57 (H) 05/10/2022     · Patient has end-stage renal disease and is no longer making urine  · Patient's creatinine baseline appears to be 4-5  · POA patient's creatinine 10 57  · Of note patient missed dialysis yesterday  · Nephrology is on board and completed dialysis yesterday, and is okay with him going back to his regular MWF to receive dialysis as an outpatient Friday

## 2022-05-12 NOTE — OCCUPATIONAL THERAPY NOTE
Occupational Therapy Evaluation      Oliver SEBASTIAN [facility]    5/12/2022    Principal Problem:    ESRD (end stage renal disease) ([place] [address] )  Active Problems:    Benign essential hypertension    Thrombocytopenia (HCC)    Hyperlipidemia    Paroxysmal atrial fibrillation (HCC)    Hyperammonemia (HCC)    Generalized weakness    Acute hepatic encephalopathy      Past Medical History:   Diagnosis Date    Acute renal failure superimposed on stage 4 chronic kidney disease ([place] [address] ) 10/4/2020    Arthritis     BPH without urinary obstruction     CKD (chronic kidney disease), stage III (HCC)     baseline 1 6-1 7    Dialysis patient ([place] [address] )     GERD (gastroesophageal reflux disease)     Hyperlipidemia     Hypertension     MI (myocardial infarction) ([place] [address] )        Past Surgical History:   Procedure Laterality Date    APPENDECTOMY      CARDIAC SURGERY      COLONOSCOPY  2017    FRACTURE SURGERY      IR AV FISTULA/GRAFT DECLOT  9/3/2021    IR AV FISTULA/GRAFT DECLOT  9/30/2021    IR AV FISTULA/GRAFT DECLOT  1/7/2022    IR PERITONEAL DIALYSIS CATHETER PLACEMENT  2/3/2021    IR PERITONEAL DIALYSIS CATHETER REMOVAL  2/17/2021    IR PERITONEAL DIALYSIS CATHETER REMOVAL AND PLACEMENT NEW SITE  2/9/2021    IR TEMPORARY DIALYSIS CATHETER PLACEMENT  12/23/2019    IR THORACENTESIS  12/24/2019    IR THORACENTESIS  12/27/2019    IR TUNNELED CENTRAL LINE REMOVAL  4/27/2021    IR TUNNELED DIALYSIS CATHETER CHECK/CHANGE/REPOSITION/ANGIOPLASTY  1/6/2020    IR TUNNELED DIALYSIS CATHETER PLACEMENT  1/2/2020    IR TUNNELED DIALYSIS CATHETER PLACEMENT  2/15/2021    IR TUNNELED DIALYSIS CATHETER REMOVAL  3/4/2020    KY ANASTOMOSIS,AV,ANY SITE Left 3/26/2021    Procedure: LEFT FOREARM LOOP AV GRAFT;  Surgeon: Chai Nixon MD;  Location: [facility];  Service: Vascular    KY ASCEND AORTA GRAFT W ROOT REPLACMENT  VALVE CONDUIT/CORON RECONSTRUCT N/A 12/15/2019    Procedure: BENTALL PROCEDURE (ASCENDING AORTIC REPAIR) with 26mm Gelweave Graft;  Surgeon: Raffy Zepeda DO; Location[facility];  Service: Cardiac Surgery    PA RECONSTR TOTAL SHOULDER IMPLANT Right 12/5/2017    Procedure: ARTHROPLASTY SHOULDER REVERSE with OPEN CYST EXCISION;  Surgeon: Charmaine Dasilva MD;  Location: [facility];  Service: Orthopedics    SHOULDER SURGERY      WRIST SURGERY          05/12/22 1400   OT Last Visit   OT Visit Date 05/12/22   Note Type   Note type Evaluation   Additional Comments Pt presents supine in bed, agreeable to OT evaluation  Pt states, "I was just about to shave "   Restrictions/Precautions   Other Precautions Limb alert;Telemetry; Fall Risk   Pain Assessment   Pain Assessment Tool 0-10   Pain Score No Pain   Home Living   Type of Home Apartment   Home Layout One level   Bathroom Shower/Tub Walk-in shower   Bathroom Toilet Standard   Prior Function   Level of Comfort Independent with ADLs and functional mobility   Lives With Spouse   Receives Help From Family   ADL Assistance Independent   IADLs Needs assistance   Falls in the last 6 months 0   Vocational Retired  (, bulldozer , martinez, , , 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  ")   Lifestyle   Autonomy PTA, pt (I) with all self cares and functional mobility  Reciprocal Relationships Granddaughter assists with IADLs   Intrinsic Gratification "Not much  Sitting outside in the sun "   Psychosocial   Psychosocial (WDL) WDL   Subjective   Subjective "I do good   I feel a little weak still but I feel good enough to take care of myself "   ADL   Eating Assistance 7  Independent   Grooming Assistance 7  Independent   Grooming Deficit Shaving  (pt shaving sinkside)   UB Bathing Assistance Unable to assess  (pt reports anticipates having no difficulty)   LB Bathing Assistance Unable to assess   UB Dressing Assistance 7  Independent   LB Dressing Assistance 7  Independent   Toileting Assistance  7  Independent   Bed Mobility   Supine to Sit 7  Independent   Sit to Supine 7  Independent   Transfers   Sit to Stand 5  Supervision  (inc'd to (I))   Stand to Sit 7  Independent   Functional Mobility   Functional Mobility 7  Independent   Additional Comments no DME used for mobility within room  Balance   Static Sitting Good   Dynamic Sitting Good   Static Standing Fair +   Dynamic Standing Fair  (minimally challenged; observed reaching outside of VALERIE while standing sinkside)   Activity Tolerance   Activity Tolerance Patient limited by fatigue;Patient limited by pain   Nurse Made Aware RN cleared pt for OT evaluation  RUE Assessment   RUE Assessment WFL   LUE Assessment   LUE Assessment   (shldr ROM limited but WFL distally )   Hand Function   Gross Motor Coordination Functional   Fine Motor Coordination Functional   Sensation   Light Touch No apparent deficits   Vision - Complex Assessment   Acuity Able to read clock/calendar on wall without difficulty   Cognition   Overall Cognitive Status WFL   Arousal/Participation Alert   Attention Within functional limits   Orientation Level Oriented X4   Memory Within functional limits   Following Commands Follows one step commands without difficulty   Comments Pt verified ID by stating name and   Wrist band also used to verify ID  Pt primarily [place]ese speaking but was able to effectively communicate with this writer without difficulty understanding OT's questions/comments  Assessment   Prognosis Good   Assessment Pt is a [de-identified] y o  male seen for OT evaluation s/p admit [facility] on 5/10[facility]2022 w/ ESRD (end stage renal disease) ([facility] [address] )  Comorbidities affecting pt's functional performance at time of assessment are listed above  Prior to admission, pt was (I) with ADls and functional mobility  Greyson Alonso Upon evaluation: Pt presents at baseline level of independence and was able to complete all basic ADLs including standing to shave at sinkside )(I)ly  Furthermore, pt demonstrated ability to perform ADL transfers (I)ly   Pt with no further OT needs while in hospital   Pt with score of 90/100 on the Barthel Index  Pt's raw score on the AM-PAC Daily activity inpatient short form is 24, standardized score is 57 54, greater than 39 4  Patients at this level are likely to benefit from DC to home  This writer agrees with the latter recommendation  Based on OT evaluation, the assessment(s) completed, performance deficits listed, and current level of function, pt identified as a low complexity evaluation  Goals   Patient Goals To go home     Plan   OT Frequency Eval only   Recommendation   OT Discharge Recommendation No rehabilitation needs   AM-PAC Daily Activity Inpatient   Lower Body Dressing 4   Bathing 4   Toileting 4   Upper Body Dressing 4   Grooming 4   Eating 4   Daily Activity Raw Score 24   Daily Activity Standardized Score (Calc for Raw Score >=11) 57 54   AM-PAC Applied Cognition Inpatient   Following a Speech/Presentation 3   Understanding Ordinary Conversation 4   Taking Medications 4   Remembering Where Things Are Placed or Put Away 3   Remembering List of 4-5 Errands 3   Taking Care of Complicated Tasks 3   Applied Cognition Raw Score 20   Applied Cognition Standardized Score 41 76   Barthel Index   Feeding 10   Bathing 5   Grooming Score 5   Dressing Score 10   Bladder Score 10   Bowels Score 10   Toilet Use Score 10   Transfers (Bed/Chair) Score 10   Mobility (Level Surface) Score 15   Stairs Score 5   Barthel Index Score 90     Erma Smith, OT

## 2022-05-12 NOTE — PROGRESS NOTES
NEPHROLOGY PROGRESS NOTE   [facility] Shows A Core [de-identified] y o  male MRN: [id number]  Unit/Bed#: S -01 Encounter: [id number]  Reason for Consult: ESRD      SUMMARY:    28-year-old male with a history of end-stage renal disease who presented for diarrhea and generalized weakness  ASSESSMENT and PLAN:       End Stage Renal Disease  -Modality:In-Center Hemodialysis  -Schedule: Monday/Wednesday/Friday  -Dialysis Unit: [facility] [address]  -Access: Left Arm AV Fistula  -Presciption: reviewed  -Status:  See overall stable from a renal standpoint  -Plan:  underwent hemodialysis yesterday  No objections to discharge from renal standpoint  Discussed with the primary team   Can receive dialysis as an outpatient at his outpatient unit tomorrow     Anemia  - Epogen with HD    General weakness  --has been chronic  --PT/OT    Paroxysmal atrial fibrillation  --currently rate controlled  --complaining of palpitations but heart rate currently in the 60s  --currently on metoprolol  --discussed with the primary team     Mineral bone disorder-chronic kidney disease  - hectoral 3 mcg, renal diet, not on phos binder     Blood pressure/volume status  - + diarrhea, BP soft      SUBJECTIVE / INTERVAL HISTORY:    No chest pain or shortness of breath  Complaining of weakness and palpitations  OBJECTIVE:  Current Weight: Weight - Scale: 80 5 kg (177 lb 7 5 oz)  Vitals:    05/12/22 0828 05/12/22 0837 05/12/22 0931 05/12/22 0932   BP: (!) 85/44 102/50 105/71 105/71   BP Location:  Right arm  Right arm   Pulse: 65  82    Resp:       Temp: 98 2 °F (36 8 °C)  97 9 °F (36 6 °C)    TempSrc:       SpO2: 96%  98%    Weight:       Height:           Intake/Output Summary (Last 24 hours) at 5/12/2022 1033  Last data filed at 5/12/2022 0901  Gross per 24 hour   Intake 300 ml   Output 2700 ml   Net -2400 ml       Review of Systems:    12 point ROS has been reviewed  Physical Exam  Vitals and nursing note reviewed     Constitutional:       General: He is not in acute distress  Appearance: He is well-developed  He is not diaphoretic  HENT:      Head: Normocephalic and atraumatic  Eyes:      General: No scleral icterus  Pupils: Pupils are equal, round, and reactive to light  Cardiovascular:      Rate and Rhythm: Normal rate and regular rhythm  Heart sounds: Normal heart sounds  No murmur heard  No friction rub  No gallop  Pulmonary:      Effort: Pulmonary effort is normal  No respiratory distress  Breath sounds: Normal breath sounds  No wheezing or rales  Chest:      Chest wall: No tenderness  Abdominal:      General: Bowel sounds are normal  There is no distension  Palpations: Abdomen is soft  Tenderness: There is no abdominal tenderness  There is no rebound  Musculoskeletal:         General: Normal range of motion  Cervical back: Normal range of motion and neck supple  Skin:     Findings: No rash  Neurological:      Mental Status: He is alert and oriented to person, place, and time           Medications:    Current Facility-Administered Medications:     acetaminophen (TYLENOL) tablet 650 mg, 650 mg, Oral, Q6H PRN, Ayush Madison MD, 650 mg at 05/11/22 1553    apixaban (ELIQUIS) tablet 2 5 mg, 2 5 mg, Oral, BID, Ayush Madison MD, 2 5 mg at 05/12/22 8528    atorvastatin (LIPITOR) tablet 40 mg, 40 mg, Oral, Daily, Ayush Madison MD, 40 mg at 05/12/22 3518    doxercalciferol (HECTOROL) injection 3 mcg, 3 mcg, Intravenous, Once per day on Mon Wed Fri, EL Arreaga, 3 mcg at 05/11/22 0076    epoetin uday (EPOGEN,PROCRIT) injection 1,800 Units, 1,800 Units, Intravenous, Once per day on Mon Wed Fri, EL Arreaga, 1,800 Units at 05/11/22 1443    gabapentin (NEURONTIN) capsule 100 mg, 100 mg, Oral, HS, Ayush Madison MD, 100 mg at 05/11/22 2122    iron sucrose (VENOFER) 50 mg in sodium chloride 0 9 % 100 mL IVPB, 50 mg, Intravenous, Weekly, EL Arreaga, 50 mg at 05/11/22 1113    metoprolol succinate (TOPROL-XL) 24 hr tablet 25 mg, 25 mg, Oral, BID, Cristhian Wheeler MD, 25 mg at 05/12/22 0926    midodrine (PROAMATINE) tablet 5 mg, 5 mg, Oral, TID AC, Cristhian Wheeler MD, 5 mg at 05/12/22 0600    pantoprazole (PROTONIX) EC tablet 40 mg, 40 mg, Oral, Early Morning, Cristhian Wheeler MD, 40 mg at 05/12/22 0601    Laboratory Results:  Results from last 7 days   Lab Units 05/12/22  0506 05/11/22  0756 05/11/22  0535 05/10/22  1421   WBC Thousand/uL 4 45  --  5 18 4 57   HEMOGLOBIN g/dL 9 1*  --  9 2* 10 2*   HEMATOCRIT % 27 4*  --  27 8* 30 5*   PLATELETS Thousands/uL 70*  --  70* 78*   POTASSIUM mmol/L 3 9 4 7  --  5 3   CHLORIDE mmol/L 99 104  --  103   CO2 mmol/L 30 20*  --  24   BUN mg/dL 37* 71*  --  64*   CREATININE mg/dL 7 42* 11 19*  --  10 57*   CALCIUM mg/dL 8 4 8 9  --  8 6       PLEASE NOTE:  This encounter was completed utilizing the [employer] Direct Speech Voice Recognition Software  Grammatical errors, random word insertions, pronoun errors and incomplete sentences are occasional consequences of the system due to software limitations, ambient noise and hardware issues  These may be missed by proof reading prior to affixing electronic signature  Any questions or concerns about the content, text or information contained within the body of this dictation should be directly addressed to the physician for clarification  Please do not hesitate to call me directly if you have any any questions or concerns

## 2022-05-12 NOTE — PHYSICAL THERAPY NOTE
PHYSICAL THERAPY EVALUATION NOTE    Patient Name: Rosario Dawson  Today's Date: 5/12/2022    AGE:   [de-identified] y o  Mrn:   [id number]  ADMIT DX:  Hyperammonemia (HCC) [E72 20]  Weakness [R53 1]  ESRD needing dialysis ([facility] ) [N18 6, Z99 2]  Listlessness [R53 83]    Past Medical History:   Diagnosis Date    Acute renal failure superimposed on stage 4 chronic kidney disease ([facility] ) 10/4/2020    Arthritis     BPH without urinary obstruction     CKD (chronic kidney disease), stage III (HCC)     baseline 1 6-1 7    Dialysis patient [facility])     GERD (gastroesophageal reflux disease)     Hyperlipidemia     Hypertension     MI (myocardial infarction) ([place] [address] )      Length Of Stay: 2  PHYSICAL THERAPY EVALUATION :   05/12/22 1053   PT Last Visit   PT Visit Date 05/12/22   Pain Assessment   Pain Assessment Tool 0-10   Pain Score 3   Pain Location/Orientation Location: Back   Hospital Pain Intervention(s) Repositioned   Restrictions/Precautions   Other Precautions Limb alert;Telemetry; Fall Risk;Pain;Contact/isolation  (L UE limb alert)   Home Living   Type of [address] [place] [address] One level; Other (Comment)  (no CHRISTOPHER)   Additional Comments lives w/ spouse  ambulates w/o device  no DME  independent w/ ADLs  granddaughter assist w/ IADLs  no falls in last 6 months  General   Additional Pertinent History 5/11/22 at 21/22, blood pressure was 88/52   Family/Caregiver Present No   Cognition   Arousal/Participation Alert   Orientation Level Oriented to person; Other (Comment)  (pt was identified w/ full name, birth date)   Following Commands Follows one step commands with increased time or repetition   Subjective   Subjective pt seen supine in bed  agreed to PT eval  pt reported having back pain  pt is mostly [place] speaking     RUE Assessment   RUE Assessment WFL   LUE Assessment   LUE Assessment X  (shoulder limited ROM, otherwise WFL)   RLE Assessment   RLE Assessment WFL  (3+ to 4-/5)   LLE Assessment   LLE Assessment WFL  (3+ to 4-/5)   Coordination   Movements are Fluid and Coordinated 1   Light Touch   RLE Light Touch Grossly intact   LLE Light Touch Grossly intact   Bed Mobility   Supine to Sit 7  Independent   Additional items HOB elevated; Increased time required   Sit to Supine 7  Independent  (pt declined remaining out of bed)   Additional items HOB elevated; Increased time required   Transfers   Sit to Stand 5  Supervision   Additional items Increased time required   Stand to Sit 5  Supervision   Additional items Increased time required   Additional Comments Comfortable Gait Speed: 0 43 m/s   Ambulation/Elevation   Gait pattern Narrow VALERIE; Foward flexed; Short stride; Inconsistent shandra   Gait Assistance 5  Supervision   Additional items Verbal cues  (for posture, full step length)   Assistive Device None; Other (Comment)  (pt declined trial of assistive device)   Distance 120 feet  standing rest break  100 feet  (additional not possible due to fatigue)   Balance   Static Sitting Good   Static Standing Fair   Ambulatory Fair -   Activity Tolerance   Activity Tolerance Patient limited by fatigue;Patient limited by pain   Nurse Made Aware spoke to [address] [place] [address]   Assessment   Prognosis Fair   Problem List Decreased strength;Decreased range of motion;Decreased endurance; Impaired balance;Decreased mobility; Decreased safety awareness;Pain   Assessment Pt presents with generalized weakness and missed dialysis  Dx: ESRD, hyperammonemia, generalized weakness, thrombocytopenia, and HTN  order placed for PT eval and tx  pt presents w/ comorbidities of HTN, hyperlipidemia, MI, ESRD, and CKD and personal factors of advanced age and preferred language not [place] (language barrier)  pt presents w/ weakness, decreased ROM, decreased endurance, impaired balance, gait deviations, decreased safety awareness and fall risk   these impairments are evident in findings from physical examination (weakness and decreased ROM), mobility assessment (need for standby assist w/ all phases of mobility when usually mobilizing independently, tolerance to only 120 feet of ambulation and need for cueing for mobility technique), and Comfortable Gait Speed: 0 43 m/s (less than 1 0 m/s indicates need for intervention to address falls risk) and 4 Item Dynamic Gait Index: 7/12 (less than 10 indicates fall risk)  pt needed input for task focus and mobility technique  pt is at risk for falls due to physical and safety awareness deficits  pt's clinical presentation is unstable/unpredictable (evident in poor blood pressure control, need for assist w/ all phases of mobility when usually mobilizing independently, tolerance to only 120 feet of ambulation, pain impacting overall mobility status and need for input for mobility technique/safety)  pt needs inpatient PT tx to improve mobility deficits and progress mobility training as appropriate  discharge recommendation is for home w/ family support and home PT to reduce fall risk and maximize level of functional independence  Goals   Patient Goals go home   STG Expiration Date 05/22/22   Short Term Goal #1 pt will: Increase bilateral LE strength 1/2 grade to facilitate independent mobility, Perform all transfers independently to improve independence, Ambulate 300 ft  without assistive device independently w/o LOB to improve functional independence, Increase ambulatory balance 1 grade to decrease risk for falls, Complete exercise program independently to increase strength and endurance, Tolerate 3 hr OOB to faciliate upright tolerance, Improve gait speed to 0 53 m/s to reduce fall risk and increase independence and Complete 4 Item Dynamic Gait Index w/ score of 10/12 or greater to decrease fall risk   PT Treatment Day 0   Plan   Treatment/Interventions Functional transfer training;LE strengthening/ROM; Therapeutic exercise; Endurance training;Patient/family training;Equipment eval/education;Gait training   PT Frequency 1-2x/wk   Recommendation   PT Discharge Recommendation Home with home health rehabilitation   [address] Mobility Inpatient   Turning in Bed Without Bedrails 4   Lying on Back to Sitting on Edge of Flat Bed 4   Moving Bed to Chair 3   Standing Up From Chair 3   Walk in Room 3   Climb 3-5 Stairs 1   Basic Mobility Inpatient Raw Score 18   Basic Mobility Standardized Score 41 05   Highest Level Of Mobility   JH-HLM Goal 6: Walk 10 steps or more   JH-HLM Highest Level of Mobility 7: Walk 25 feet or more   JH-HLM Goal Achieved Yes   End of Consult   Patient Position at End of Consult Supine; All needs within reach     4 Item Dynamic Gait Index  3/3 Gait level surface  2/3 Change in gait speed  1/3 Gait with horizontal head turns  1/3 Gait with vertical head turns  7/12 total score (<10/12 indicates increased risk of fall)    Comfortable Gait Speed: 0 43 m/s  Gait Speed Interpretation:  Gain of 0 1 m/s is a predictor of well-being in those w/ abnormal walking speed compared to age-patched peers  <0 7m/s is at an increased risk for falls    Household ambulator: <0 4 m/s  Limited community ambulator: 0 4-0 8 m/s  [employer] ambulator: 0 8-1 2 m/s  Able to safely cross streets: >1 2 m/s     The patient's AM-PAC Basic Mobility Inpatient Short Form Raw Score is 18  A Raw score of greater than 16 suggests the patient may benefit from discharge to home  Please also refer to the recommendation of the Physical Therapist for safe discharge planning  Skilled PT recommended while in hospital and upon DC to progress pt toward treatment goals       Geno Gregory, PT

## 2022-05-12 NOTE — ASSESSMENT & PLAN NOTE
· POA: Patient somnolent and demonstrating mild asterixis, forgetfulness and ammonia level 167 likely secondary to LOPEZ   · Monitored telemetry

## 2022-05-12 NOTE — UTILIZATION REVIEW
Inpatient Admission Authorization Request   NOTIFICATION OF INPATIENT ADMISSION/INPATIENT AUTHORIZATION REQUEST   SERVICING FACILITY:   [address]  Tax ID: [id number]  NPI: [id number]  Place of Service: Inpatient [facility]  [address]  Place of Service Code: 24     ATTENDING PROVIDER:  Attending Name and NPI#: Nila Henriquez Md [[id number]]  Address: [address]  Phone: [phone]     UTILIZATION REVIEW CONTACT:  Lorri Gamez Utilization   Network Utilization Review Department  Phone: [phone]  Fax: [phone]  Email: [email or web address]     PHYSICIAN ADVISORY SERVICES:  FOR [email or web address] REVIEW - MEDICAL NECESSITY DENIAL  Phone: [phone]  Fax: [phone]  Email: [email or web address]     TYPE OF REQUEST:  Inpatient Status     ADMISSION INFORMATION:  ADMISSION DATE/TIME: 5/10/22  4:12 PM  PATIENT DIAGNOSIS CODE/DESCRIPTION:  Hyperammonemia ([facility] [address] ) [E72 20]  Weakness [R53 1]  ESRD needing dialysis ([facility] ) [N18 6, Z99 2]  Listlessness [R53 83]  DISCHARGE DATE/TIME: No discharge date for patient encounter  IMPORTANT INFORMATION:  Please contact the Lorri Gamez directly with any questions or concerns regarding this request  Department voicemails are confidential     Send requests for admission clinical reviews, concurrent reviews, approvals, and administrative denials due to lack of clinical to fax [phone]

## 2022-05-13 PROBLEM — E72.20 HYPERAMMONEMIA (HCC): Status: RESOLVED | Noted: 2022-01-01 | Resolved: 2022-01-01

## 2022-05-13 NOTE — PLAN OF CARE
Post-Dialysis RN Treatment Note    Blood Pressure:  Pre 102/59 mm/Hg  Post 102/53 mmHg   EDW 81 5  kg    Weight:  Pre NA kg   Post NA kg   Mode of weight measurement: Bed Scale   Volume Removed  1000 ml / due to low BP   Treatment duration 180 minutes    NS given  No    Treatment shortened?  Yes, describe: pts request due to discharge   Medications given during Rx Hectorol, Epogen   Estimated Kt/V  Not Applicable   Access type: AV graft   Access Issues: No    Report called to primary nurse   Yes / Martin Obrien RN      2000 ml as tolerated, 3 hrs/ pts request due to pending discharge, 2K bath  Problem: METABOLIC, FLUID AND ELECTROLYTES - ADULT  Goal: Electrolytes maintained within normal limits  Description: INTERVENTIONS:  - Monitor labs and assess patient for signs and symptoms of electrolyte imbalances  - Administer electrolyte replacement as ordered  - Monitor response to electrolyte replacements, including repeat lab results as appropriate  - Instruct patient on fluid and nutrition as appropriate  Outcome: Progressing  Goal: Fluid balance maintained  Description: INTERVENTIONS:  - Monitor labs   - Monitor I/O and WT  - Instruct patient on fluid and nutrition as appropriate  - Assess for signs & symptoms of volume excess or deficit  Outcome: Progressing

## 2022-05-13 NOTE — PROGRESS NOTES
NEPHROLOGY PROGRESS NOTE   Oliver Sharpe [de-identified] y o  male MRN: [id number]  Unit/Bed#: S -01 Encounter: [id number]  Reason for Consult: ESRD      SUMMARY:    46-year-old male with a history of end-stage renal disease who presented for diarrhea and generalized weakness      ASSESSMENT and PLAN:        End Stage Renal Disease  -Modality:In-Center Hemodialysis  -Schedule: Monday/Wednesday/Friday  -Dialysis Unit: [address]  -Access: Left Arm AV Fistula  -Presciption: reviewed  -Status:  See overall stable from a renal standpoint  -Plan:  Plan for dialysis today, no objections to discharge from renal standpoint     Anemia  - Epogen with HD     General weakness  --has been chronic  --PT/OT     Paroxysmal atrial fibrillation  --currently rate controlled  --currently on metoprolol     Mineral bone disorder-chronic kidney disease  - hectoral 3 mcg, renal diet, not on phos binder     Blood pressure/volume status  - + diarrhea, BP soft      SUBJECTIVE / INTERVAL HISTORY:    Resting comfortably still feeling weak    OBJECTIVE:  Current Weight: Weight - Scale: 80 5 kg (177 lb 7 5 oz)  Vitals:    05/12/22 2120 05/12/22 2205 05/13/22 0641 05/13/22 0712   BP: 116/69 108/64 105/62 107/60   BP Location:       Pulse:  69 83 67   Resp:  18  18   Temp: 98 3 °F (36 8 °C) 98 3 °F (36 8 °C) 98 3 °F (36 8 °C) 98 1 °F (36 7 °C)   TempSrc:       SpO2:  97% 98% 98%   Weight:       Height:           Intake/Output Summary (Last 24 hours) at 5/13/2022 1018  Last data filed at 5/13/2022 0801  Gross per 24 hour   Intake 720 ml   Output --   Net 720 ml       Review of Systems:    12 point ROS has been reviewed  Physical Exam  Vitals and nursing note reviewed  Constitutional:       General: He is not in acute distress  Appearance: He is well-developed  HENT:      Head: Normocephalic and atraumatic  Eyes:      General: No scleral icterus       Conjunctiva/sclera: Conjunctivae normal       Pupils: Pupils are equal, round, and reactive to light  Cardiovascular:      Rate and Rhythm: Normal rate and regular rhythm  Heart sounds: S1 normal and S2 normal  No murmur heard  No friction rub  No gallop  Pulmonary:      Effort: Pulmonary effort is normal  No respiratory distress  Breath sounds: Normal breath sounds  No wheezing or rales  Abdominal:      General: Bowel sounds are normal       Palpations: Abdomen is soft  Tenderness: There is no abdominal tenderness  There is no rebound  Musculoskeletal:         General: Normal range of motion  Cervical back: Normal range of motion and neck supple  Skin:     Findings: No rash  Neurological:      Mental Status: He is alert and oriented to person, place, and time     Psychiatric:         Behavior: Behavior normal          Medications:    Current Facility-Administered Medications:     acetaminophen (TYLENOL) tablet 650 mg, 650 mg, Oral, Q6H PRN, Rox De Souza MD, 650 mg at 05/11/22 1553    apixaban (ELIQUIS) tablet 2 5 mg, 2 5 mg, Oral, BID, Rox De Souza MD, 2 5 mg at 05/13/22 0841    atorvastatin (LIPITOR) tablet 40 mg, 40 mg, Oral, Daily, Rox De Souza MD, 40 mg at 05/13/22 0841    doxercalciferol (HECTOROL) injection 3 mcg, 3 mcg, Intravenous, Once per day on Mon Wed Fri, EL Win, 3 mcg at 05/11/22 6809    epoetin uday (EPOGEN,PROCRIT) injection 1,800 Units, 1,800 Units, Intravenous, Once per day on Mon Wed Fri, EL Cavanaugh, 1,800 Units at 05/11/22 0711    gabapentin (NEURONTIN) capsule 100 mg, 100 mg, Oral, HS, Rox De Souza MD, 100 mg at 05/12/22 2122    iron sucrose (VENOFER) 50 mg in sodium chloride 0 9 % 100 mL IVPB, 50 mg, Intravenous, Weekly, EL Win, 50 mg at 05/11/22 1113    metoprolol succinate (TOPROL-XL) 24 hr tablet 25 mg, 25 mg, Oral, BID, Rox De Souza MD, 25 mg at 05/12/22 2122    midodrine (PROAMATINE) tablet 5 mg, 5 mg, Oral, TID AC, Rox De Souza MD, 5 mg at 05/13/22 0644    pantoprazole (PROTONIX) EC tablet 40 mg, 40 mg, Oral, Early Morning, Otto Lopez MD, 40 mg at 05/13/22 0644    trimethobenzamide (TIGAN) IM injection 200 mg, 200 mg, Intramuscular, Q6H PRN, Otto Lopez MD, 200 mg at 05/12/22 1604    Laboratory Results:  Results from last 7 days   Lab Units 05/13/22  0550 05/12/22  0506 05/11/22  0756 05/11/22  0535 05/10/22  1421   WBC Thousand/uL 4 78 4 45  --  5 18 4 57   HEMOGLOBIN g/dL 9 5* 9 1*  --  9 2* 10 2*   HEMATOCRIT % 29 3* 27 4*  --  27 8* 30 5*   PLATELETS Thousands/uL 74* 70*  --  70* 78*   POTASSIUM mmol/L 4 5 3 9 4 7  --  5 3   CHLORIDE mmol/L 98 99 104  --  103   CO2 mmol/L 29 30 20*  --  24   BUN mg/dL 45* 37* 71*  --  64*   CREATININE mg/dL 8 94* 7 42* 11 19*  --  10 57*   CALCIUM mg/dL 8 7 8 4 8 9  --  8 6       PLEASE NOTE:  This encounter was completed utilizing the [employer] Direct Speech Voice Recognition Software  Grammatical errors, random word insertions, pronoun errors and incomplete sentences are occasional consequences of the system due to software limitations, ambient noise and hardware issues  These may be missed by proof reading prior to affixing electronic signature  Any questions or concerns about the content, text or information contained within the body of this dictation should be directly addressed to the physician for clarification  Please do not hesitate to call me directly if you have any any questions or concerns

## 2022-05-13 NOTE — PLAN OF CARE
Problem: Potential for Falls  Goal: Patient will remain free of falls  Description: INTERVENTIONS:  - Educate patient/family on patient safety including physical limitations  - Instruct patient to call for assistance with activity   - Consult OT/PT to assist with strengthening/mobility   - Keep Call bell within reach  - Keep bed low and locked with side rails adjusted as appropriate  - Keep care items and personal belongings within reach  - Initiate and maintain comfort rounds  - Make Fall Risk Sign visible to staff  - Offer Toileting every 2 Hours, in advance of need  - Initiate/Maintain bed alarm  - Obtain necessary fall risk management equipment: bed alarm  - Apply yellow socks and bracelet for high fall risk patients  - Consider moving patient to room near nurses station  Outcome: Progressing     Problem: METABOLIC, FLUID AND ELECTROLYTES - ADULT  Goal: Electrolytes maintained within normal limits  Description: INTERVENTIONS:  - Monitor labs and assess patient for signs and symptoms of electrolyte imbalances  - Administer electrolyte replacement as ordered  - Monitor response to electrolyte replacements, including repeat lab results as appropriate  - Instruct patient on fluid and nutrition as appropriate  Outcome: Progressing  Goal: Fluid balance maintained  Description: INTERVENTIONS:  - Monitor labs   - Monitor I/O and WT  - Instruct patient on fluid and nutrition as appropriate  - Assess for signs & symptoms of volume excess or deficit  Outcome: Progressing     Problem: DISCHARGE PLANNING  Goal: Discharge to home or other facility with appropriate resources  Description: INTERVENTIONS:  - Identify barriers to discharge w/patient and caregiver  - Arrange for needed discharge resources and transportation as appropriate  - Identify discharge learning needs (meds, wound care, etc )  - Arrange for interpretive services to assist at discharge as needed  - Refer to Case Management Department for coordinating discharge planning if the patient needs post-hospital services based on physician/advanced practitioner order or complex needs related to functional status, cognitive ability, or social support system  Outcome: Progressing     Problem: MOBILITY - ADULT  Goal: Maintain or return to baseline ADL function  Description: INTERVENTIONS:  -  Assess patient's ability to carry out ADLs; assess patient's baseline for ADL function and identify physical deficits which impact ability to perform ADLs (bathing, care of mouth/teeth, toileting, grooming, dressing, etc )  - Assess/evaluate cause of self-care deficits   - Assess range of motion  - Assess patient's mobility; develop plan if impaired  - Assess patient's need for assistive devices and provide as appropriate  - Encourage maximum independence but intervene and supervise when necessary  - Involve family in performance of ADLs  - Assess for home care needs following discharge   - Consider OT consult to assist with ADL evaluation and planning for discharge  - Provide patient education as appropriate  Outcome: Progressing  Goal: Maintains/Returns to pre admission functional level  Description: INTERVENTIONS:  - Perform BMAT or MOVE assessment daily    - Set and communicate daily mobility goal to care team and patient/family/caregiver  - Collaborate with rehabilitation services on mobility goals if consulted  - Perform Range of Motion 2 times a day  - Reposition patient every 2 hours    - Dangle patient 2 times a day  - Stand patient 2 times a day  - Ambulate patient 2 times a day  - Out of bed to chair 2 times a day   - Out of bed for meals 2 times a day  - Out of bed for toileting  - Record patient progress and toleration of activity level   Outcome: Progressing

## 2022-05-13 NOTE — DISCHARGE SUMMARY
[facility]  Discharge- [facility] 1940, [de-identified] y o  male MRN: [id number]  Unit/Bed#: S -01 Encounter: [id number]  Primary Care Provider: EL Montaño   Date and time admitted to hospital: 5/10/2022  1:30 PM    * ESRD (end stage renal disease) [facility])  Assessment & Plan  Lab Results   Component Value Date    EGFR 5 05/13/2022    EGFR 6 05/12/2022    EGFR 3 05/11/2022    CREATININE 8 94 (H) 05/13/2022    CREATININE 7 42 (H) 05/12/2022    CREATININE 11 19 (H) 05/11/2022     · Patient has end-stage renal disease and is no longer making urine  · Patient's creatinine baseline appears to be 4-5  · POA patient's creatinine 10 57  · Patient received dialysis Wednesday and today, he can resume his MWF scheduled dialysis       Hyperammonemia (HCC)-resolved as of 5/13/2022  Assessment & Plan  · POA patient's ammonia elevated to 167, with signs of mild asterixis and reports of increased forgetfulness per family  · Likely secondary to missed dialysis and underlying cirrhosis likely secondary to LOPEZ per patient's history  · Resolved     Acute hepatic encephalopathy  Assessment & Plan  · POA: Patient somnolent and demonstrating mild asterixis, forgetfulness and ammonia level 167 likely secondary to LOPEZ     Generalized weakness  Assessment & Plan  · Patient reports generalized weakness for the last 2 weeks  · Patient's son at bedside reports since patient was started on dialysis a year ago he has had increased weakness  · Likely secondary to chronic deconditioning  · PT recommending home with home health    Paroxysmal atrial fibrillation (HCC)  Assessment & Plan  · Continue home Toprol XL 25 mg b i d   · Patient currently in atrial fibrillation with well-controlled rates    Benign essential hypertension  Assessment & Plan  · Patient has a past medical history significant for hypertension  · Continue home medications Toprol XL 25 mg b i d      Hyperlipidemia  Assessment & Plan  · Continue home Lipitor 40 mg daily    Thrombocytopenia (HCC)  Assessment & Plan  · POA patient's platelet count 78, likely secondary to impaired synthetic function of liver from LOPEZ noted from hospitalization in [address] Problems             Resolved Problems  Date Reviewed: 5/3/2022          Resolved    Hyperammonemia ([address] ) 5/13/2022     Resolved by  Ilan Hinojosa MD              Discharging Resident: Ilan Hinojosa MD  Discharging Attending: Delmi Segovia MD  PCP: EL Rocha  Admission Date:   Admission Orders (From admission, onward)     Ordered        05/10/22 1612  Inpatient Admission  Once                      Discharge Date: 05/13/22    Consultations During Hospital Stay:  · Nephrology    Procedures Performed:   · Dialysis    Significant Findings / Test Results:   · None    Incidental Findings:   · None     Test Results Pending at Discharge (will require follow up): · None     Outpatient Tests Requested:  · None    Complications:  None    Reason for Admission: missed hemodialysis    Hospital Course:   Mai Singh is a [de-identified] y o  male patient who originally presented to the hospital on 5/10/2022 due to generalized weakness, diarrhea and missed dialysis prior to admission  Patient was found to have hyperammonia to 167  He was given 2 doses of lactulose and rifaximin  He initially exhibited mild asterixis  After receiving the lactulose and rifaximin it went away  A right upper quadrant ultrasound was performed and demonstrated nodular hepatic contour in keeping with cirrhosis and small volumes of ascites likely secondary to LOPEZ  He was later evaluated by PT/OT who recommended home with home health  He was given two treatments of hemodialysis Wednesday and Friday the day of discharge  He is feeling a lot better today and denies any weakness  Today he was deemed medically stable for discharge       Please see above list of diagnoses and related plan for additional information       Condition at Discharge: stable    Discharge Day Visit / Exam:   Subjective:  No acute events overnight  Patient has no complaints and is curious about discharge timeline today  Vitals: Blood Pressure: (!) 105/49 (05/13/22 1400)  Pulse: 73 (05/13/22 1400)  Temperature: (!) 97 4 °F (36 3 °C) (05/13/22 1310)  Temp Source: Tympanic (05/13/22 1310)  Respirations: 16 (05/13/22 1400)  Height: 5' 7" (170 2 cm) (05/10/22 1615)  Weight - Scale: 80 5 kg (177 lb 7 5 oz) (05/10/22 1615)  SpO2: 99 % (05/13/22 1400)  Exam:   Physical Exam  Vitals and nursing note reviewed  Constitutional:       Appearance: He is well-developed  HENT:      Head: Normocephalic and atraumatic  Eyes:      Conjunctiva/sclera: Conjunctivae normal    Cardiovascular:      Rate and Rhythm: Normal rate  Rhythm irregular  Heart sounds: No murmur heard  Pulmonary:      Effort: Pulmonary effort is normal  No respiratory distress  Breath sounds: Normal breath sounds  Abdominal:      General: There is distension  Palpations: Abdomen is soft  Tenderness: There is no abdominal tenderness  Musculoskeletal:      Cervical back: Neck supple  Right lower leg: No edema  Left lower leg: No edema  Skin:     General: Skin is warm and dry  Neurological:      Mental Status: He is alert  Discussion with Family: Updated  (daughter) via phone  Discharge instructions/Information to patient and family:   See after visit summary for information provided to patient and family  Provisions for Follow-Up Care:  See after visit summary for information related to follow-up care and any pertinent home health orders  Disposition:   Home    Planned Readmission: None     Discharge Medications:  See after visit summary for reconciled discharge medications provided to patient and/or family        **Please Note: This note may have been constructed using a voice recognition system**

## 2022-05-13 NOTE — ASSESSMENT & PLAN NOTE
· POA: Patient somnolent and demonstrating mild asterixis, forgetfulness and ammonia level 167 likely secondary to LOPEZ

## 2022-05-13 NOTE — CASE MANAGEMENT
Case Management Assessment & Discharge Planning Note    Patient name Prabhu Encinas  Location S /S -01 MRN [id number]  : 1940 Date 2022       Current Admission Date: 5/10/2022  Current Admission Diagnosis:ESRD (end stage renal disease) [facility])   Patient Active Problem List    Diagnosis Date Noted    Acute hepatic encephalopathy 2022    Hyperammonemia ([address] ) 05/10/2022    Generalized weakness 05/10/2022    Atrial fibrillation with rapid ventricular response ([address] ) 2022    Hypotension 2022    Status post aortic dissection repair 2022    Hepatic cirrhosis ([address] ) 2022    Rotator cuff arthropathy, left 2022    Hypertensive heart disease with heart failure ([address] ) 2021    Coagulopathy ([address] ) 2021    Chronic diastolic CHF (congestive heart failure) ([address] ) 2021    GI bleed 2021    Anemia 2021    Candidal intertrigo 2021    Vision loss 2021    Status post placement of arteriovenous graft 2021    ESRD (end stage renal disease) ([address] )     Insomnia 2021    Rheumatoid arthritis ([address] ) 2021    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 ([address] ) 2020    Chronic kidney disease-mineral and bone disorder 2020    Acute on chronic diastolic (congestive) heart failure ([address] ) 10/04/2020    GONZALES (dyspnea on exertion) 2020    Paroxysmal atrial fibrillation ([address] ) 2020    Monoclonal gammopathy 2020    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    Encounter for post surgical wound check 2020    Sleep disorder 2020    Hyperlipidemia     Complication of vascular dialysis catheter 2020    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 2019    Hyperphosphatemia 2019    Anticoagulation goal of INR 2 to 3 2019    Thrombocytopenia ([address] ) 2019    Dissection of thoracic aorta ([address] ) 12/15/2019    S/P aorta repair 12/15/2019    Bradycardia 04/23/2019    Benign prostatic hyperplasia without lower urinary tract symptoms 01/09/2019    Lumbar pain 11/11/2018    Overweight (BMI 25 0-29 9) 02/23/2018    Rotator cuff tear arthropathy, right 12/05/2017    Secondary osteoarthritis of right shoulder 12/05/2017    Benign essential hypertension 10/03/2012      LOS (days): 3  Geometric Mean LOS (GMLOS) (days): 4 30  Days to GMLOS:1 5     OBJECTIVE:  PATIENT READMITTED TO HOSPITAL  Risk of Unplanned Readmission Score: 28 33         Current admission status: Inpatient       Preferred Pharmacy:   [facility] [facility], [address] [facility]  Phone: [phone] Fax: [address] [facility], [address] [facility] [address]  Phone: [phone] Fax: [phone]    Primary Care Provider: EL Tompkins    Primary Insurance: [facility]  Secondary Insurance: [address] [place] [address]    ASSESSMENT:  [address]     [facility], [address] [facility]   Primary Phone: [phone] (Mobile)                 Readmission Root Cause  30 Day Readmission: Yes  Who directed you to return to the hospital?: Family  Did you understand whom to contact if you had questions or problems?: Yes    Patient Information  Admitted from[de-identified] Home  Mental Status: Alert  During Assessment patient was accompanied by: Not accompanied during assessment  Assessment information provided by[de-identified] Daughter  Primary Caregiver: Self  Support Systems: Self, Muslim/diane community, Family members  [place] of Residence: [address] [place] [address] do you live in?: [place]  Home entry access options   Select all that apply : No steps to enter home  Type of Current Residence: Apartment  Floor Level: 1  Upon entering residence, is there a bedroom on the main floor (no further steps)?: Yes  Upon entering residence, is there a bathroom on the main floor (no further steps)?: Yes  In the last 12 months, was there a time when you were not able to pay the mortgage or rent on time?: No  In the last 12 months, was there a time when you did not have a steady place to sleep or slept in a shelter (including now)?: No  Living Arrangements: Lives w/ Spouse/significant other  Is patient a ?: No    Activities of Daily Living Prior to Admission  Completes ADLs independently?: Yes  Ambulates independently?: Yes  Does patient use assisted devices?: No  Does patient currently own DME?: No  Does the patient have a history of Short-Term Rehab?: No  Does patient have a history of HHC?: Yes  Does patient currently have [employer]?: No         Patient Information Continued  Income Source: SSI/SSD  Does patient have prescription coverage?: Yes  Within the past 12 months, you worried that your food would run out before you got the money to buy more : Never true  Within the past 12 months, the food you bought just didn't last and you didn't have money to get more : Never true  Food insecurity resource given?: N/A  Does patient receive dialysis treatments?: Yes (shereen weathers KENDALL chair time of 0600 )  Does patient have a history of substance abuse?: No  Does patient have a history of Mental Health Diagnosis?: No         Means of Transportation  Means of Transport to Appts[de-identified] Family transport  In the past 12 months, has lack of transportation kept you from medical appointments or from getting medications?: No  In the past 12 months, has lack of transportation kept you from meetings, work, or from getting things needed for daily living?: No  Was application for public transport provided?: N/A        DISCHARGE DETAILS:             CM contacted family/caregiver?: Yes  Were Treatment Team discharge recommendations reviewed with patient/caregiver?: Yes  Did patient/caregiver verbalize understanding of patient care needs?: Yes  Were patient/caregiver advised of the risks associated with not following Treatment Team discharge recommendations?: Yes    Contacts  Patient Contacts: wife and caregiver at bedside  Relationship to Patient[de-identified] Family  Contact Method: Phone  Phone Number: [phone]  Reason/Outcome: Continuity of Care, Emergency Contact, Discharge [address]         Is the patient [facility] in [facility]?: Yes  [address] requested[de-identified] Occupational Therapy, Physical [address] Name[de-identified] [facility] Provider[de-identified] PCP  Home Health Services Needed[de-identified] Strengthening/Theraputic Exercises to Improve Function, Gait/ADL Training, Evaluate Functional Status and Safety  Homebound Criteria Met[de-identified] Requires the Assistance of Another Person for Safe Ambulation or to Leave the Home  Supporting Clincal Findings[de-identified] Limited Endurance    DME Referral Provided  Referral made for DME?: No         Would you like to participate in our [address] service program?  : No - Declined    Treatment Team Recommendation: Home with [id number] [facility] spoke with daughter  Pt is no longer driving himself to dialysis  His granddaughter is going to transport him from now on

## 2022-05-13 NOTE — ASSESSMENT & PLAN NOTE
Lab Results   Component Value Date    EGFR 5 05/13/2022    EGFR 6 05/12/2022    EGFR 3 05/11/2022    CREATININE 8 94 (H) 05/13/2022    CREATININE 7 42 (H) 05/12/2022    CREATININE 11 19 (H) 05/11/2022     · Patient has end-stage renal disease and is no longer making urine  · Patient's creatinine baseline appears to be 4-5  · POA patient's creatinine 10 57  · Patient received dialysis Wednesday and today, he can resume his MWF scheduled dialysis

## 2022-05-13 NOTE — PLAN OF CARE
Problem: Potential for Falls  Goal: Patient will remain free of falls  Description: INTERVENTIONS:  - Educate patient/family on patient safety including physical limitations  - Instruct patient to call for assistance with activity   - Consult OT/PT to assist with strengthening/mobility   - Keep Call bell within reach  - Keep bed low and locked with side rails adjusted as appropriate  - Keep care items and personal belongings within reach  - Initiate and maintain comfort rounds  - Make Fall Risk Sign visible to staff  - Apply yellow socks and bracelet for high fall risk patients  - Consider moving patient to room near nurses station  Outcome: Progressing     Problem: METABOLIC, FLUID AND ELECTROLYTES - ADULT  Goal: Electrolytes maintained within normal limits  Description: INTERVENTIONS:  - Monitor labs and assess patient for signs and symptoms of electrolyte imbalances  - Administer electrolyte replacement as ordered  - Monitor response to electrolyte replacements, including repeat lab results as appropriate  - Instruct patient on fluid and nutrition as appropriate  Outcome: Progressing  Goal: Fluid balance maintained  Description: INTERVENTIONS:  - Monitor labs   - Monitor I/O and WT  - Instruct patient on fluid and nutrition as appropriate  - Assess for signs & symptoms of volume excess or deficit  Outcome: Progressing     Problem: DISCHARGE PLANNING  Goal: Discharge to home or other facility with appropriate resources  Description: INTERVENTIONS:  - Identify barriers to discharge w/patient and caregiver  - Arrange for needed discharge resources and transportation as appropriate  - Identify discharge learning needs (meds, wound care, etc )  - Arrange for interpretive services to assist at discharge as needed  - Refer to Case Management Department for coordinating discharge planning if the patient needs post-hospital services based on physician/advanced practitioner order or complex needs related to functional status, cognitive ability, or social support system  Outcome: Progressing     Problem: MOBILITY - ADULT  Goal: Maintain or return to baseline ADL function  Description: INTERVENTIONS:  -  Assess patient's ability to carry out ADLs; assess patient's baseline for ADL function and identify physical deficits which impact ability to perform ADLs (bathing, care of mouth/teeth, toileting, grooming, dressing, etc )  - Assess/evaluate cause of self-care deficits   - Assess range of motion  - Assess patient's mobility; develop plan if impaired  - Assess patient's need for assistive devices and provide as appropriate  - Encourage maximum independence but intervene and supervise when necessary  - Involve family in performance of ADLs  - Assess for home care needs following discharge   - Consider OT consult to assist with ADL evaluation and planning for discharge  - Provide patient education as appropriate  Outcome: Progressing  Goal: Maintains/Returns to pre admission functional level  Description: INTERVENTIONS:  - Perform BMAT or MOVE assessment daily    - Set and communicate daily mobility goal to care team and patient/family/caregiver     - Collaborate with rehabilitation services on mobility goals if consulted  - Perform Range of Motion  - Reposition patient  - Dangle patient   - Stand patient   - Ambulate patient   - Out of bed to chair  - Out of bed for meals   - Out of bed for toileting  - Record patient progress and toleration of activity level   Outcome: Progressing

## 2022-05-13 NOTE — ASSESSMENT & PLAN NOTE
· POA patient's platelet count 78, likely secondary to impaired synthetic function of liver from [facility] noted from hospitalization in [place]

## 2022-05-13 NOTE — CASE MANAGEMENT
Case Management Discharge Planning Note    Patient name Anamaria Billy  Location S /S -01 MRN [id number]  : 1940 Date 2022       Current Admission Date: 5/10/2022  Current Admission Diagnosis:ESRD (end stage renal disease) [facility])   Patient Active Problem List    Diagnosis Date Noted    Acute hepatic encephalopathy 2022    Generalized weakness 05/10/2022    Atrial fibrillation with rapid ventricular response ([address] ) 2022    Hypotension 2022    Status post aortic dissection repair 2022    Hepatic cirrhosis ([address] ) 2022    Rotator cuff arthropathy, left 2022    Hypertensive heart disease with heart failure ([address] ) 2021    Coagulopathy ([address] ) 2021    Chronic diastolic CHF (congestive heart failure) ([address] ) 2021    GI bleed 2021    Anemia 2021    Candidal intertrigo 2021    Vision loss 2021    Status post placement of arteriovenous graft 2021    ESRD (end stage renal disease) ([address] )     Insomnia 2021    Rheumatoid arthritis ([address] ) 2021    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 ([address] ) 2020    Chronic kidney disease-mineral and bone disorder 2020    Acute on chronic diastolic (congestive) heart failure ([address] ) 10/04/2020    GONZALES (dyspnea on exertion) 2020    Paroxysmal atrial fibrillation ([address] ) 2020    Monoclonal gammopathy 2020    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    Encounter for post surgical wound check 2020    Sleep disorder 2020    Hyperlipidemia     Complication of vascular dialysis catheter 2020    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 2019    Hyperphosphatemia 2019    Anticoagulation goal of INR 2 to 3 2019    Thrombocytopenia ([address] ) 2019    Dissection of thoracic aorta ([address] ) 12/15/2019    S/P aorta repair 12/15/2019    Bradycardia 2019  Benign prostatic hyperplasia without lower urinary tract symptoms 01/09/2019    Lumbar pain 11/11/2018    Overweight (BMI 25 0-29 9) 02/23/2018    Rotator cuff tear arthropathy, right 12/05/2017    Secondary osteoarthritis of right shoulder 12/05/2017    Benign essential hypertension 10/03/2012      LOS (days): 3  Geometric Mean LOS (GMLOS) (days): 4 70  Days to GMLOS:1 7     OBJECTIVE:  Risk of Unplanned Readmission Score: 28 39         Current admission status: Inpatient   Preferred Pharmacy:   [facility], [address] [place] [address]  Phone: [phone] Fax: 406 [facility], [address] [place] [address]  Phone: [phone] Fax: [phone]    Primary Care Provider: EL Hathaway    Primary Insurance: [facility]  Secondary Insurance: [address] [place] [address]    DISCHARGE DETAILS:      Treatment Team Recommendation: Home with [address] [employer] [employer]  Discharge Destination Plan[de-identified] Home with [address] [employer]      IMM Given (Date):: 05/13/22 (reviewed with daughter, daughter agreeable to plan, imm placed in scan bin)  IMM Given to[de-identified] Family  Family notified[de-identified] daughter Blayne Donahue spoke with daughter, [facility] notified of [facility] today  Tesfaye Robbie called and chair time set up for Monday

## 2022-05-13 NOTE — ASSESSMENT & PLAN NOTE
· Patient reports generalized weakness for the last 2 weeks  · Patient's son at bedside reports since patient was started on dialysis a year ago he has had increased weakness  · Likely secondary to chronic deconditioning  · PT recommending home with home health

## 2022-05-14 NOTE — CASE COMMUNICATION
TC to patient to scheduled [facility] visit evening of 5 13 22  No answer   VM left in German with arrival time of visit  Text message also sent with same information  Arrived for visit no answer at door or by phone   VM left for nasrin to contact this therapist to reschedule [facility]

## 2022-05-16 NOTE — UTILIZATION REVIEW
Notification of Discharge   This is a Notification of Discharge from our facility [address]  Please be advised that this patient has been discharge from our facility  Below you will find the admission and discharge date and time including the patients disposition  UTILIZATION REVIEW CONTACT:  Paul Monroy MA  Utilization   Network Utilization Review Department  Phone: [phone] x carefully listen to the prompts  All voicemails are confidential   Email: [email or web address]     PHYSICIAN ADVISORY SERVICES:  FOR SDCW-OB-GTHF REVIEW - MEDICAL NECESSITY DENIAL  Phone: [phone]  Fax: [phone]  Email: [email or web address]     PRESENTATION DATE: 5/10/2022  1:30 PM  OBERVATION ADMISSION DATE:   INPATIENT ADMISSION DATE: 5/10/22  4:12 PM   DISCHARGE DATE: 5/13/2022  5:11 PM  DISPOSITION: Home with [place] with [address] INFORMATION:  Send all requests for admission clinical reviews, approved or denied determinations and any other requests to dedicated fax number below belonging to the campus where the patient is receiving treatment   List of dedicated fax numbers:  [address] [place] [address] DENIALS (Administrative/Medical Necessity) [phone]   [address] (Maternity/NICU/Pediatrics) [phone]   Otoniel Marie [phone]   [address] [facility] [address] [phone]   [address] [place] [address] [phone]   [address] [place] [address] [address] [place] [address],[address] [phone]   [facility]  [phone]   [address] [place] [address], [facility]  [address] [facility] [address] [facility] [phone]

## 2022-05-19 NOTE — TELEPHONE ENCOUNTER
Discussed with daughter Lisandro Grigsby as she was very concerned about her father as patient is feeling washed out, does not have any energy and at times does not feel like he wants to do dialysis  Patient also have significant pain issues and feels that cannot continue 4 hours of dialysis session due to pain issues  I have discussed potential options of seeing pain specialist versus palliative Care Medicine to discuss goals of care  As per daughter, she had discussed about goals of care with patient  Patient clearly says that he does not want to continue dialysis but at the same time he does not want to consider hospice/comfort care as per daughter  Daughter says that he is not ready to die yet  I have recommended to discuss this further with PCP to see if there is any pain medication can be given prior to dialysis to help with his pain issues and if that helps  Also explained that some of the pain medications can cause hypotension and we may have to monitor this closely  She will call PCP office tomorrow

## 2022-05-19 NOTE — CASE COMMUNICATION
Granddaughter called to reschedule [facility] visit from today at 12 pm to tomorrow at 1130am as she would not be available today

## 2022-05-22 NOTE — CASE COMMUNICATION
[facility] your patient to [address] [place] [address] service with the following disciplines:      PT  This report is informational only, no responses is needed  Primary focus of home health care strengthening and gait activities  Patient stated goals of care  to get stronger not fall and be able to walk without AD   Anticipated visit pattern and next visit date1 x1wk 2 x 4wks on non dialysis days  See medication list   Significant cl inical findings  declined OT intervention at [facility]  Potential barriers to goal achievement faitgue  Other pertinent information none     Thank you for allowing us to participate in the care of your patient        Rigo Vu PT

## 2022-05-24 NOTE — PATIENT INSTRUCTIONS
June 14th MRI Abdomen at 3:15 at [address] to check liver and pancreas  Must have dialysis the next morning  Follow up with gastroenterology for liver and pancrease, [phone], Dr Ivonne Lopez  I will reach out to your dialysis doctor regarding approval for a sleeping medication for you, and possibly a pain medication that could be used on dialysis days  Our office will schedule cardiology appointment, [phone]  Go to the emergency room for increasing heart, chest pain, dizziness, shortness of breath, or increasing edema

## 2022-05-30 PROBLEM — K86.89 PANCREATIC MASS: Status: ACTIVE | Noted: 2022-01-01

## 2022-05-30 NOTE — ASSESSMENT & PLAN NOTE
Chronic low back pain after a fall many years ago  Pain is manageable, except during hemodialysis  Sitting in one spot for 4 hours is not tolerable from a pain perspective  As per his inquiry, treatment time cannot be reduced  We spoke at length regarding if he wanted to continue HD  He understands if he were to stop HD, he would die  He is not ready to stop HD, and is not ready to die  We discussed today, adding a pain medication that he could take prior to HD, that may make treatments more tolerable  I will reach out to his nephrologist to discuss

## 2022-05-30 NOTE — ASSESSMENT & PLAN NOTE
Possible pancreatic mass, on CT abdomen/pelvis completed in [place] in March  Will follow up with MRI and gastroenterolgy follow up  As per radiologist I previously spoke with, contrast would be needed to have adequate imaging  Patient needs to have MRI in the afternoon, with hemodialysis next morning  MRI has been scheduled in this way, and patient is aware he must have HD next morning after MRI

## 2022-05-30 NOTE — ASSESSMENT & PLAN NOTE
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

## 2022-05-30 NOTE — ASSESSMENT & PLAN NOTE
Having difficulty sleeping  He just lays awake in bed and can not fall sleep  He gets up multiple times at night, walks around, tries to go back to bed, but not able to fall sleep  He dozes off periodically but only for minutes at a time  We discussed sleep medication today, I will reach out to his nephrologist regarding this

## 2022-05-30 NOTE — ASSESSMENT & PLAN NOTE
Heart rate irregularly, irregular today  Initially on auscultation by me 114 HR, however, EKG with HR 70  He denies palpitations or shortness of breath  EKG today with a  Fib, changed from last EKG which was NSR  There are ST/T wave changes which are unchanged compared to 5/12/22  He needs to schedule cardiology follow up, which he agrees to do  Continue Eliquis, metoprolol, midodrine (for BP)

## 2022-05-30 NOTE — PROGRESS NOTES
FAMILY PRACTICE OFFICE VISIT       NAME: Oliver Sharpe  AGE: [de-identified] y o  SEX: male       : 1940        MRN: [id number]    Assessment and Plan   1  Hospital discharge follow-up  Comments:  Admitted to the hospital May 10 through May 13th for end-stage renal disease  He missed a hemodialysis appointment  2  ESRD (end stage renal disease) [facility])  Assessment & Plan:  Lab Results   Component Value Date    EGFR 5 2022    EGFR 6 2022    EGFR 3 2022    CREATININE 8 94 (H) 2022    CREATININE 7 42 (H) 2022    CREATININE 11 19 (H) 2022     Continue Monday, Wednesday, Friday hemodialysis schedule  We discussed importance of not missing hemodialysis sessions  EMLA cream as needed to fistula prior to HD  Orders:  -     lidocaine-prilocaine (EMLA) cream; Apply topically as needed for mild pain (with HD sessions) With dialysis sessions    3  Atrial fibrillation with rapid ventricular response (HCC)  Assessment & Plan:  Heart rate irregularly, irregular today  Initially on auscultation by me 114 HR, however, EKG with HR 70  He denies palpitations or shortness of breath  EKG today with a  Fib, changed from last EKG which was NSR  There are ST/T wave changes which are unchanged compared to 22  He needs to schedule cardiology follow up, which he agrees to do  Continue Eliquis, metoprolol, midodrine (for BP)  Orders:  -     Ambulatory Referral to Cardiology; Future  -     POCT ECG    4  Benign essential hypertension  Assessment & Plan:  /60  Recently started on midodrine to help raise BP as he was hypotensive on metoprolol, but needs metoprolol for rate control  Continue cardiology follow up  Orders:  -     Ambulatory Referral to Cardiology; Future  -     POCT ECG    5  Gastrointestinal hemorrhage, unspecified gastrointestinal hemorrhage type  Assessment & Plan:  GI bleed in December, with EGD and colonoscopy  No findings for cause of bleeding     March was hospitalized in [place] for dark stools and possible GI bleed  Reviewed EGD results completed on April 1st, showing 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)  Assessment & Plan:  Hepatic cirrhosis noted on CT abdomen completed in March when he was in [place]  Also noted on recent ultrasound of the right upper quadrant  Again I recommend he follow with Gastroenterology  7  Sleep disorder  Assessment & Plan:  Having difficulty sleeping  He just lays awake in bed and can not fall sleep  He gets up multiple times at night, walks around, tries to go back to bed, but not able to fall sleep  He dozes off periodically but only for minutes at a time  We discussed sleep medication today, I will reach out to his nephrologist regarding this  8  Chronic bilateral low back pain without sciatica  Assessment & Plan:  Chronic low back pain after a fall many years ago  Pain is manageable, except during hemodialysis  Sitting in one spot for 4 hours is not tolerable from a pain perspective  As per his inquiry, treatment time cannot be reduced  We spoke at length regarding if he wanted to continue HD  He understands if he were to stop HD, he would die  He is not ready to stop HD, and is not ready to die  We discussed today, adding a pain medication that he could take prior to HD, that may make treatments more tolerable  I will reach out to his nephrologist to discuss         9  Pancreatic mass  Assessment & Plan:  Possible pancreatic mass, on CT abdomen/pelvis completed in [place] in March  Will follow up with MRI and gastroenterolgy follow up  As per radiologist I previously spoke with, contrast would be needed to have adequate imaging  Patient needs to have MRI in the afternoon, with hemodialysis next morning  MRI has been scheduled in this way, and patient is aware he must have HD next morning after MRI  Patient Instructions   1  June 14th MRI Abdomen at 3:15 at [facility] to check liver and pancreas  Must have dialysis the next morning  2  Follow up with gastroenterology for liver and pancrease, [phone], Dr Lolly Garcia  3  I will reach out to your dialysis doctor regarding approval for a sleeping medication for you, and possibly a pain medication that could be used on dialysis days  4  Our office will schedule cardiology appointment, [phone]   5  Go to the emergency room for increasing heart, chest pain, dizziness, shortness of breath, or increasing edema  Follow up with me in June, 1 month  Chief Complaint     Chief Complaint   Patient presents with    Transition of Care Management       History of Present Illness     Amrita Sharpe is an [de-identified]year old male presenting today for hospital discharge follow up  He was admitted to the hospital May 10 through May 13th for end-stage renal disease  He was experiencing weakness, diarrhea, and had missed dialysis appointment  Had hyper ammonia at 167  Treated with lactulose, and rifaximin  Right upper quadrant ultrasound demonstrated nodular hepatic contour keeping with cirrhosis in small volumes of ascites likely secondary to LOPEZ  He has upcoming scheduled MRI of the abdomen with and without contrast and MRCP, due to questionable pancreatic mass on recent CT scan completed in [place]  He feels tired and weak  Sometimes has dry heaves  He is not sleeping  Sometimes feels his heart is beating fast     He has no dizziness, shortness of breath, chest pain    Known history of atrial fibrillation on Coumadin recently changed to Eliquis for compliance reasons  Aware he needs to schedule cardiology follow-up  He does not feel anxious or depressed  He is accompanied by his granddaughter today, who has recently moved to the area from [place] and will be his caregiver now  Camila Lainez [phone]    He complains of chronic low back pain  He fell many years ago and had fractured vertebrae  Since this he has chronic back pain the comes and goes  Pain is managed well usually, staying active, not sitting too long  Feels good lying in bed or up and moving  He is having a hard time with dialysis, sitting in one spot for 4 hours, exacerbates pain, and has hard time with this  TCM Call (since 4/29/2022)     Date and time call was made  5/16/2022  9:01 AM    Hospital care reviewed  Records reviewed    Patient was hospitialized at  [address] [place] [address]    Date of Admission  05/10/22    Date of discharge  05/13/22    Diagnosis  ESRD, Hyperammonemia, Acute hepatic encephalopathy (DNSA:   05/17/22)    Disposition  Home    Were the patients medications reviewed and updated  No    Current Symptoms  Fatigue      TCM Call (since 4/29/2022)     Post hospital issues  Reduced activity    Should patient be enrolled in anticoag monitoring? No    Scheduled for follow up?   Yes    Did you obtain your prescribed medications  Yes    Do you need help managing your prescriptions or medications  No    Is transportation to your appointment needed  No    I have advised the patient to call PCP with any new or worsening symptoms    Haylie Tovar, [address] members    Support System  Family    Are you recieving any outpatient services  Yes    What type of services  Dialysis    Are you recieving home care services  Yes    Types of home care services  Nurse visit    Interperter language line needed  No    Counseling  Family    Counseling topics  Importance of RX compliance    Comments Apt scheduled for Tu-5/24th at 3pm          Review of Systems   Review of Systems   Constitutional: Positive for fatigue  HENT: Negative  Respiratory: Negative  Negative for cough, chest tightness, shortness of breath and wheezing  Cardiovascular: Positive for leg swelling  Gastrointestinal: Negative  Musculoskeletal: Negative  Skin: Negative  Neurological: Positive for weakness  Hematological: Negative  Psychiatric/Behavioral: Positive for sleep disturbance  Negative for dysphoric mood, self-injury and suicidal ideas  The patient is not nervous/anxious  I have reviewed the patient's medical history in detail; there are no changes to the history as noted in the electronic medical record  Objective     Vitals:    05/24/22 1455   BP: 120/60   Pulse: 65   Resp: 16   Temp: 97 8 °F (36 6 °C)   TempSrc: Temporal   SpO2: 100%   Weight: 83 9 kg (185 lb)   Height: 5' 7" (1 702 m)     Wt Readings from Last 3 Encounters:   05/24/22 83 9 kg (185 lb)   05/20/22 81 6 kg (180 lb)   05/10/22 80 5 kg (177 lb 7 5 oz)     Physical Exam  Vitals and nursing note reviewed  Constitutional:       General: He is not in acute distress  Appearance: Normal appearance  He is not ill-appearing  HENT:      Head: Atraumatic  Right Ear: Tympanic membrane normal       Left Ear: Tympanic membrane normal       Mouth/Throat:      Mouth: Mucous membranes are moist       Pharynx: Oropharynx is clear  Eyes:      Conjunctiva/sclera: Conjunctivae normal       Pupils: Pupils are equal, round, and reactive to light  Cardiovascular:      Rate and Rhythm: Normal rate and regular rhythm  Heart sounds: No murmur heard  Pulmonary:      Effort: Pulmonary effort is normal  No respiratory distress  Breath sounds: Normal breath sounds  No wheezing or rales  Abdominal:      General: Bowel sounds are normal  There is distension (mild)  Palpations: Abdomen is soft  Tenderness:  There is no abdominal tenderness  Musculoskeletal:      Cervical back: Normal range of motion and neck supple  Right lower leg: Edema (  +1 pitting lower extremity edema) present  Left lower leg: Edema present  Lymphadenopathy:      Cervical: No cervical adenopathy  Neurological:      Mental Status: He is alert  Psychiatric:         Mood and Affect: Mood normal             ALLERGIES:  No Known Allergies    Current Medications     Current Outpatient Medications   Medication Sig Dispense Refill    apixaban (Eliquis) 2 5 mg Take 1 tablet (2 5 mg total) by mouth 2 (two) times a day 180 tablet 3    atorvastatin (LIPITOR) 40 mg tablet TAKE 1 TABLET (40 MG TOTAL) BY MOUTH DAILY 90 tablet 0    bumetanide (BUMEX) 2 mg tablet TAKE 2 TABLETS (4 MG TOTAL) BY MOUTH 2 (TWO) TIMES A DAY      docusate sodium (COLACE) 100 mg capsule Take 1 capsule (100 mg total) by mouth in the morning and 1 capsule (100 mg total) in the evening   180 capsule 0     MG capsule TAKE 1 CAPSULE (100 MG TOTAL) BY MOUTH 2 (TWO) TIMES A DAY 30 capsule 3    gabapentin (NEURONTIN) 100 mg capsule TAKE 1 CAPSULE BY MOUTH DAILY IN THE EVENING      ketoconazole (NIZORAL) 2 % cream APPLY TOPICALLY DAILY 30 g 2    lidocaine (LIDODERM) 5 % Apply 1 patch topically daily Remove & Discard patch within 12 hours or as directed by MD 30 patch 0    lidocaine-prilocaine (EMLA) cream Apply topically as needed for mild pain (with HD sessions) With dialysis sessions 30 g 3    metoprolol succinate (TOPROL-XL) 25 mg 24 hr tablet Take 1 tablet (25 mg total) by mouth 2 (two) times a day 60 tablet 0    midodrine (PROAMATINE) 5 mg tablet Take 1 tablet (5 mg total) by mouth 3 (three) times a day before meals 90 tablet 0    nystatin (MYCOSTATIN) powder Apply topically 3 (three) times a day as needed (rash) 45 g 1    pantoprazole (PROTONIX) 40 mg tablet Take 1 tablet (40 mg total) by mouth daily in the early morning 30 tablet 2     No current facility-administered medications for this visit           Health Maintenance     Health Maintenance   Topic Date Due    Hepatitis A Vaccine (1 of 2 - Risk 2-dose series) Never done    Hepatitis B Vaccine (1 of 3 - Risk 3-dose series) Never done    COVID-19 Vaccine (4 - Booster for Pfizer series) 04/10/2022    Fall Risk  02/08/2023    Depression Screening  02/08/2023    Medicare Annual Wellness Visit (AWV)  02/08/2023    BMI: Followup Plan  02/08/2023    BMI: Adult  05/24/2023    DTaP,Tdap,and Td Vaccines (2 - Td or Tdap) 05/01/2029    Hepatitis C Screening  Completed    Pneumococcal Vaccine: 65+ Years  Completed    Influenza Vaccine  Completed    HIB Vaccine  Aged Out    IPV Vaccine  Aged Out    Meningococcal ACWY Vaccine  Aged Out    HPV Vaccine  Aged Out     Immunization History   Administered Date(s) Administered    COVID-19 PFIZER VACCINE 0 3 ML IM 03/31/2021, 04/21/2021, 12/10/2021    INFLUENZA 10/16/2014, 09/02/2015, 08/15/2016, 09/15/2018    Influenza Quadrivalent Preservative Free 3 years and older IM 10/16/2014    Influenza Split High Dose Preservative Free IM 01/20/2015    Influenza, high dose seasonal 0 7 mL 10/28/2020, 10/27/2021    Pneumococcal Conjugate 13-Valent 04/17/2019    Pneumococcal Polysaccharide PPV23 01/07/2021    Tdap 05/01/2019    Tuberculin Skin Test-PPD Intradermal 01/09/2020, 02/22/2021    Unknown 01/19/2022    Zoster 01/20/2015    Zoster Vaccine Recombinant 09/15/2018, 04/17/2019    influenza, trivalent, adjuvanted 08/28/2019       EL Murphy

## 2022-05-30 NOTE — ASSESSMENT & PLAN NOTE
/60  Recently started on midodrine to help raise BP as he was hypotensive on metoprolol, but needs metoprolol for rate control  Continue cardiology follow up

## 2022-05-30 NOTE — ASSESSMENT & PLAN NOTE
Lab Results   Component Value Date    EGFR 5 05/13/2022    EGFR 6 05/12/2022    EGFR 3 05/11/2022    CREATININE 8 94 (H) 05/13/2022    CREATININE 7 42 (H) 05/12/2022    CREATININE 11 19 (H) 05/11/2022     Continue Monday, Wednesday, Friday hemodialysis schedule  We discussed importance of not missing hemodialysis sessions  EMLA cream as needed to fistula prior to HD

## 2022-05-30 NOTE — ASSESSMENT & PLAN NOTE
Hepatic cirrhosis noted on CT abdomen completed in March when he was in [place]  Also noted on recent ultrasound of the right upper quadrant  Again I recommend he follow with Gastroenterology

## 2022-06-01 PROBLEM — R71.8 ELEVATED MCV: Status: ACTIVE | Noted: 2022-01-01

## 2022-06-01 PROBLEM — E87.5 HYPERKALEMIA: Status: ACTIVE | Noted: 2022-01-01

## 2022-06-01 NOTE — CONSULTS
[address] [de-identified] y o  male MRN: [id number]  Unit/Bed#: ED 09 Encounter: [id number]    ASSESSMENT and PLAN:    [de-identified] y o  male with ESRD, LOPEZ, a fib, HTN, HPL, IgG lamda, rheumatoid arthritis,  aortic dissection with prior repair, who was admitted to [facility] after presenting with lethargy, diarrhea  A renal consultation is requested today for assistance in the management of ESRD    1) ESRD    - outpt unit [address]  - access - LUE AVG with + faint thrill/bruit  - missed dialysis this week  Last treatment last Friday 5/27    Plan:  - give midodrine now and then 1/2 way thru HD if needed  - HD today   - treatment for hepatic encephalopathy per Primary team  - consider CT head  - CXR - f/u final read  Personal review with signs of congestion  - attempt UF on HD today if can tolerate  - calcium gluconate now  - insulin/d50 now  - avoid kayex  - reviewed case with Primary team Attending, Nurse, HD Nurse, family at bedside  - consider GIB eval  - trend Hb  - check UA if makes urine (daughter states minimal UOP)  - consider repeating RUQ u/s (no pain on RUQ palpation currently to note)    Update-patient was hypotensive even after midodrine during dialysis and received albumin  Blood pressures improved but still remain marginal   Discussing with nursing team   Ultrafiltration total 1 L  I have messaged ICU team to discuss case also --> ICU starting vasopressor  -patient received 2 45 hours of dialysis only  Added 3 hours  This is due to patient pulley needle when he turned    -repeat BMP this evening to monitor potassium   - BP now 90s with levophed    2) volume    - requiring oxygen currently   - xray as above  - UF as crystal but monitor hypotension closely    3) hypotension    - give midodrine now and 1/2 way thru HD  - restart metoprolol if tolerates once pt more stable  - infectious eval in progress by Primary team    4) anemia    - pt is on URBANO 1800 units as outpt  - if mentation improves, can restart with Friday HD     5) MBD - on hecterol 3 mcg with HD    6) GIB?    - pt with BRBPR per daughter this past weekend and now dark/tarry stool  - Hb stable currently  - consider further eval/trending Hb  - EGD 4/1/2022 with small grade esophageal grade I varix; erythematous mucosa in antrum    7) lethargy    - ammonia sig elevated  - missed HD two treatment this week  - possible mult factorial  - check UA if makes urine (daughter states no urine currently)    8) LOPEZ/ panc lesion/ gallbadder abn on prior imaging    - per Primary team  - CT scan 3/31 - cirrhotic configuration of liver with evidence of portal HTN, varix, mild splenomegaly  Mildly distended gallbladder; cannot rule out panc mass    9) hyperkalemia    - in setting of missing HD this week and consideration of GIB  - HD today   - Qt prolonged  Parvez gluconate  - insulin/D50  - avoid kayex for now    10) thrombocytopenia - per Primary team      HISTORY OF PRESENT ILLNESS:  Requesting Physician: Devora Mar DO  Reason for Consult: ESRD    Denise Rojas is a [de-identified] y o  male with ESRD, LOPEZ, a fib, HTN, HPL, aortic dissection with prior repair, who was admitted to [facility] after presenting with lethargy, diarrhea  A renal consultation is requested today for assistance in the management of ESRD    Granddaughter is providing history as pt is lethargic  Since April 10th granddaughter who moved from [facility] to help with pt care has noted pt is more fatigued and sleeping during day and awake at night  Not eating well  No fevers, chills  + nausea, and dry heaves  Has BRBPR on Sunday/monday and now with dark tarry stool  Had diarrhea today and therefore did not go to HD today due to this and feeling weak  Also did not go to HD on Monday  Did go to all three treatments last week  Not confused  No pain  Admission 5/2-5/4 - a fib with RVR  Midodrine was restarted this admission  Metoprolol was resumed       5/10-5/13 - p/w weakness, diarrhea, missing dialysis  Was given lactulose and rifaximin  There was concern for hepatic encephalopathy  Had nodular hepatic contour on u/s  PAST MEDICAL HISTORY:  Past Medical History:   Diagnosis Date    [facility] 10[facility]/2020    Arthritis     BPH without urinary obstruction     CKD (chronic kidney disease), stage III (HCC)     baseline 1 6-1 7    Dialysis patient ([place] [address] )     GERD (gastroesophageal reflux disease)     Hyperlipidemia     Hypertension     MI (myocardial infarction) ([place] [address] )        PAST SURGICAL HISTORY:  Past Surgical History:   Procedure Laterality Date    APPENDECTOMY      CARDIAC SURGERY      COLONOSCOPY  2017    FRACTURE SURGERY      IR AV FISTULA/GRAFT DECLOT  9/3/2021    IR AV FISTULA/GRAFT DECLOT  9/30/2021    IR AV FISTULA/GRAFT DECLOT  1/7/2022    IR PERITONEAL DIALYSIS CATHETER PLACEMENT  2/3/2021    IR PERITONEAL DIALYSIS CATHETER REMOVAL  2/17/2021    IR PERITONEAL DIALYSIS CATHETER REMOVAL AND PLACEMENT NEW SITE  2/9/2021    IR TEMPORARY DIALYSIS CATHETER PLACEMENT  12/23/2019    IR THORACENTESIS  12/24/2019    IR THORACENTESIS  12/27/2019    IR TUNNELED CENTRAL LINE REMOVAL  4/27/2021    IR TUNNELED DIALYSIS CATHETER CHECK/CHANGE/REPOSITION/ANGIOPLASTY  1/6/2020    IR TUNNELED DIALYSIS CATHETER PLACEMENT  1/2/2020    IR TUNNELED DIALYSIS CATHETER PLACEMENT  2/15/2021    IR TUNNELED DIALYSIS CATHETER REMOVAL  3/4/2020    CO ANASTOMOSIS,AV,ANY SITE Left 3/26/2021    Procedure: LEFT FOREARM LOOP AV GRAFT;  Surgeon: Kim Lane MD;  [facility]: [facility];  Service: Vascular    CO ASCEND AORTA GRAFT W ROOT REPLACMENT  VALVE CONDUIT/CORON RECONSTRUCT N/A 12/15/2019    Procedure: BENTALL PROCEDURE (ASCENDING AORTIC REPAIR) with 26mm Gelweave Graft;  Surgeon: Chencho Mclean DO;  Location[facility];  Service: Cardiac Surgery    CO RECONSTR TOTAL SHOULDER IMPLANT Right 12/5/2017    Procedure: ARTHROPLASTY SHOULDER REVERSE with OPEN CYST EXCISION;  Surgeon: Sarah Sanchez MD;  Location: [facility];  Service: Orthopedics    SHOULDER SURGERY      WRIST SURGERY         ALLERGIES:  No Known Allergies    SOCIAL HISTORY:  Social History     Substance and Sexual Activity   Alcohol Use Not Currently     Social History     Substance and Sexual Activity   Drug Use Not Currently     Social History     Tobacco Use   Smoking Status Never Smoker   Smokeless Tobacco Never Used       FAMILY HISTORY:  Family History   Problem Relation Age of Onset    No Known Problems Mother     Hypertension Father     Arthritis Family     No Known Problems Daughter        MEDICATIONS:    Current Facility-Administered Medications:     midodrine (PROAMATINE) tablet 10 mg, 10 mg, Oral, Before Dialysis, Dena Bauman MD    Current Outpatient Medications:     apixaban (Eliquis) 2 5 mg, Take 1 tablet (2 5 mg total) by mouth 2 (two) times a day, Disp: 180 tablet, Rfl: 3    atorvastatin (LIPITOR) 40 mg tablet, TAKE 1 TABLET (40 MG TOTAL) BY MOUTH DAILY, Disp: 90 tablet, Rfl: 0    bumetanide (BUMEX) 2 mg tablet, TAKE 2 TABLETS (4 MG TOTAL) BY MOUTH 2 (TWO) TIMES A DAY, Disp: , Rfl:     docusate sodium (COLACE) 100 mg capsule, Take 1 capsule (100 mg total) by mouth in the morning and 1 capsule (100 mg total) in the evening , Disp: 180 capsule, Rfl: 0     MG capsule, TAKE 1 CAPSULE (100 MG TOTAL) BY MOUTH 2 (TWO) TIMES A DAY, Disp: 30 capsule, Rfl: 3    gabapentin (NEURONTIN) 100 mg capsule, TAKE 1 CAPSULE BY MOUTH DAILY IN THE EVENING, Disp: , Rfl:     ketoconazole (NIZORAL) 2 % cream, APPLY TOPICALLY DAILY, Disp: 30 g, Rfl: 2    lidocaine (LIDODERM) 5 %, Apply 1 patch topically daily Remove & Discard patch within 12 hours or as directed by MD, Disp: 30 patch, Rfl: 0    lidocaine-prilocaine (EMLA) cream, Apply topically as needed for mild pain (with HD sessions) With dialysis sessions, Disp: 30 g, Rfl: 3    metoprolol succinate (TOPROL-XL) 25 mg 24 hr tablet, Take 1 tablet (25 mg total) by mouth 2 (two) times a day, Disp: 60 tablet, Rfl: 0    midodrine (PROAMATINE) 5 mg tablet, Take 1 tablet (5 mg total) by mouth 3 (three) times a day before meals, Disp: 90 tablet, Rfl: 0    nystatin (MYCOSTATIN) powder, Apply topically 3 (three) times a day as needed (rash), Disp: 45 g, Rfl: 1    pantoprazole (PROTONIX) 40 mg tablet, Take 1 tablet (40 mg total) by mouth daily in the early morning, Disp: 30 tablet, Rfl: 2    REVIEW OF SYSTEMS:    All the systems were reviewed and were negative except as documented on the HPI      PHYSICAL EXAM:  Current Weight:    First Weight:    Vitals:    06/01/22 0846 06/01/22 1015   BP: (!) 95/48 (!) 73/35   BP Location: Right arm    Pulse: (!) 53 (!) 50   Resp: 18 16   Temp: 98 6 °F (37 °C)    TempSrc: Oral    SpO2: 94%    Height: 5' 7" (1 702 m)      No intake or output data in the 24 hours ending 06/01/22 1031  Physical Exam  General: NAD  Skin: no rash  Eyes: anicteric sclera  ENT: moist mucous membrane  Neck: supple  Chest: CTA b/l, no ronchii, no wheeze, no rubs, no rales  CVS: s1s2, no murmur, no gallop, no rub  Abdomen: soft, nontender, nl sounds  Extremities: no edema LE b/l  : no shine  Neuro: AAOX2  Psych: normal affect  Graft LUE with + thrill/bruit      Invasive Devices:      Lab Results:         Invalid input(s): ALBUMIN

## 2022-06-01 NOTE — ED PROVIDER NOTES
History  Chief Complaint   Patient presents with    Weakness - Generalized     Patient hx dialysis ( M W F); missed 5 days (Monday - holiday; and today) was on his way today but had diarrhea/BM and went home to change - did not return due to tired/weak; Denies CP/SOB/dizziness; reports dry heaves and "brown water" diarrhea     82-year-old male presents the emergency department for evaluation of generalized weakness  Patient states that he went to go to dialysis this morning but had diarrhea before he got to the facility and turned around and went home  He then felt too weak to return to dialysis  He has not had dialysis since May 27th  He has a feeling extremely weak  He has been having profuse, loose watery stool  He also reports nausea and dry heaves  He does not make urine in between dialysis sessions  He denies feeling short of breath  He denies chest pain  Family reports a relatively new diagnosis of liver disease which is in the process of being worked up as an outpatient  History provided by:  Medical records  History limited by:  Acuity of condition   used: No    Malaise - 7 years or greater  Severity:  Severe  Onset quality:  Gradual  Timing:  Constant  Progression:  Worsening  Chronicity:  New  Context: not increased activity    Relieved by:  Nothing  Worsened by:  Nothing  Ineffective treatments:  None tried  Associated symptoms: diarrhea, dizziness, lethargy, nausea, shortness of breath and vomiting    Associated symptoms: no abdominal pain, no dysuria, no fever, no headaches, no loss of consciousness, no near-syncope and no syncope    Risk factors: no new medications        Prior to Admission Medications   Prescriptions Last Dose Informant Patient Reported? Taking?     MG capsule 6/1/2022 at Unknown time  No Yes   Sig: TAKE 1 CAPSULE (100 MG TOTAL) BY MOUTH 2 (TWO) TIMES A DAY   apixaban (Eliquis) 2 5 mg 6/1/2022 at Unknown time  No Yes   Sig: Take 1 tablet (2 5 mg total) by mouth 2 (two) times a day   atorvastatin (LIPITOR) 40 mg tablet 6/1/2022 at Unknown time  No Yes   Sig: TAKE 1 TABLET (40 MG TOTAL) BY MOUTH DAILY   bumetanide (BUMEX) 2 mg tablet Not Taking at Unknown time  Yes No   Sig: TAKE 2 TABLETS (4 MG TOTAL) BY MOUTH 2 (TWO) TIMES A DAY   Patient not taking: Reported on 6/1/2022   docusate sodium (COLACE) 100 mg capsule 6/1/2022 at Unknown time  No Yes   Sig: Take 1 capsule (100 mg total) by mouth in the morning and 1 capsule (100 mg total) in the evening    gabapentin (NEURONTIN) 100 mg capsule 6/1/2022 at Unknown time  Yes Yes   Sig: TAKE 1 CAPSULE BY MOUTH DAILY IN THE EVENING   ketoconazole (NIZORAL) 2 % cream 6/1/2022 at Unknown time  No Yes   Sig: APPLY TOPICALLY DAILY   lidocaine (LIDODERM) 5 % 6/1/2022 at Unknown time  No Yes   Sig: Apply 1 patch topically daily Remove & Discard patch within 12 hours or as directed by MD   lidocaine-prilocaine (EMLA) cream 6/1/2022 at Unknown time  No Yes   Sig: Apply topically as needed for mild pain (with HD sessions) With dialysis sessions   metoprolol succinate (TOPROL-XL) 25 mg 24 hr tablet 6/1/2022 at Unknown time  No Yes   Sig: Take 1 tablet (25 mg total) by mouth 2 (two) times a day   midodrine (PROAMATINE) 5 mg tablet 6/1/2022 at Unknown time  No Yes   Sig: Take 1 tablet (5 mg total) by mouth 3 (three) times a day before meals   nystatin (MYCOSTATIN) powder 6/1/2022 at Unknown time  No Yes   Sig: Apply topically 3 (three) times a day as needed (rash)   pantoprazole (PROTONIX) 40 mg tablet 6/1/2022 at Unknown time  No Yes   Sig: Take 1 tablet (40 mg total) by mouth daily in the early morning      Facility-Administered Medications: None       Past Medical History:   Diagnosis Date    Acute renal failure superimposed on stage 4 chronic kidney disease ([place] [address] ) 10/4/2020    Arthritis     BPH without urinary obstruction     CKD (chronic kidney disease), stage III (HCC)     baseline 1 6-1 7    Dialysis patient ([place] [address] )  GERD (gastroesophageal reflux disease)     Hyperlipidemia     Hypertension     MI (myocardial infarction) ([facility] [address] )        Past Surgical History:   Procedure Laterality Date    APPENDECTOMY      CARDIAC SURGERY      COLONOSCOPY  2017    FRACTURE SURGERY      IR AV FISTULA/GRAFT DECLOT  9/3/2021    IR AV FISTULA/GRAFT DECLOT  9/30/2021    IR AV FISTULA/GRAFT DECLOT  1/7/2022    IR PERITONEAL DIALYSIS CATHETER PLACEMENT  2/3/2021    IR PERITONEAL DIALYSIS CATHETER REMOVAL  2/17/2021    IR PERITONEAL DIALYSIS CATHETER REMOVAL AND PLACEMENT NEW SITE  2/9/2021    IR TEMPORARY DIALYSIS CATHETER PLACEMENT  12/23/2019    IR THORACENTESIS  12/24/2019    IR THORACENTESIS  12/27/2019    IR TUNNELED CENTRAL LINE REMOVAL  4/27/2021    IR TUNNELED DIALYSIS CATHETER CHECK/CHANGE/REPOSITION/ANGIOPLASTY  1/6/2020    IR TUNNELED DIALYSIS CATHETER PLACEMENT  1/2/2020    IR TUNNELED DIALYSIS CATHETER PLACEMENT  2/15/2021    IR TUNNELED DIALYSIS CATHETER REMOVAL  3/4/2020    MI ANASTOMOSIS,AV,ANY SITE Left 3/26/2021    Procedure: LEFT FOREARM LOOP AV GRAFT;  Surgeon: Richard Anderson MD;  [facility];  Service: Vascular    MI ASCEND AORTA GRAFT W ROOT REPLACMENT  VALVE CONDUIT/CORON RECONSTRUCT N/A 12/15/2019    Procedure: BENTALL PROCEDURE (ASCENDING AORTIC REPAIR) with 26mm Gelweave Graft;  Surgeon: Sandra Farias DO;  [facility];  Service: Cardiac Surgery    MI RECONSTR TOTAL SHOULDER IMPLANT Right 12/5/2017    Procedure: ARTHROPLASTY SHOULDER REVERSE with OPEN CYST EXCISION;  Surgeon: Jessica Reyes MD;  [facility];  Service: Orthopedics    SHOULDER SURGERY      WRIST SURGERY         Family History   Problem Relation Age of Onset    No Known Problems Mother     Hypertension Father     Arthritis Family     No Known Problems Daughter      I have reviewed and agree with the history as documented      E-Cigarette/Vaping    E-Cigarette Use Never User      E-Cigarette/Vaping Substances    Nicotine No     THC No     CBD No     Flavoring No      Social History     Tobacco Use    Smoking status: Never Smoker    Smokeless tobacco: Never Used   Vaping Use    Vaping Use: Never used   Substance Use Topics    Alcohol use: Not Currently    Drug use: Not Currently       Review of Systems   Constitutional: Positive for appetite change  Negative for fever  Respiratory: Positive for shortness of breath  Cardiovascular: Negative for syncope and near-syncope  Gastrointestinal: Positive for diarrhea, nausea and vomiting  Negative for abdominal pain  Genitourinary: Negative for dysuria and flank pain  Neurological: Positive for dizziness and weakness  Negative for loss of consciousness and headaches  Physical Exam  Physical Exam  Constitutional:       Appearance: He is ill-appearing  HENT:      Right Ear: External ear normal       Left Ear: External ear normal       Nose: No congestion  Mouth/Throat:      Mouth: Mucous membranes are dry  Eyes:      General: No scleral icterus  Cardiovascular:      Rate and Rhythm: Bradycardia present  Heart sounds: Murmur heard  Pulmonary:      Breath sounds: Rales present  Abdominal:      Tenderness: There is no abdominal tenderness  Musculoskeletal:        Arms:       Cervical back: Normal range of motion  Right lower leg: Edema present  Left lower leg: Edema present  Skin:     General: Skin is warm  Neurological:      General: No focal deficit present  Mental Status: He is lethargic     Psychiatric:         Mood and Affect: Mood normal          Vital Signs  ED Triage Vitals [06/01/22 0846]   Temperature Pulse Respirations Blood Pressure SpO2   98 6 °F (37 °C) (!) 53 18 (!) 95/48 94 %      Temp Source Heart Rate Source Patient Position - Orthostatic VS BP Location FiO2 (%)   Oral Monitor Lying Right arm --      Pain Score       No Pain           Vitals:    06/01/22 1545 06/01/22 1600 06/01/22 1645 06/01/22 1700   BP: 98/56 (!) 84/46 (!) 101/49 (!) 106/42   Pulse: (!) 54 (!) 49 (!) 50 60   Patient Position - Orthostatic VS:             Visual Acuity  Visual Acuity    Flowsheet Row Most Recent Value   L Pupil Size (mm) 2   R Pupil Size (mm) 2   L Pupil Shape Round   R Pupil Shape Round          ED Medications  Medications   doxercalciferol (HECTOROL) injection 3 mcg (has no administration in time range)   apixaban (ELIQUIS) tablet 2 5 mg (has no administration in time range)   atorvastatin (LIPITOR) tablet 40 mg (40 mg Oral Not Given 6/1/22 1448)   midodrine (PROAMATINE) tablet 10 mg (10 mg Oral Given 6/1/22 1544)   pantoprazole (PROTONIX) EC tablet 40 mg (has no administration in time range)   trimethobenzamide (TIGAN) IM injection 200 mg (has no administration in time range)   norepinephrine (LEVOPHED) 4 mg (STANDARD CONCENTRATION) IV in sodium chloride 0 9% 250 mL (3 mcg/min Intravenous Rate/Dose Change 6/1/22 1706)   ceftriaxone (ROCEPHIN) 1 g/50 mL in dextrose IVPB (1,000 mg Intravenous New Bag 6/1/22 1652)   rifaximin (XIFAXAN) tablet 550 mg (has no administration in time range)   metroNIDAZOLE (FLAGYL) IVPB (premix) 500 mg 100 mL (has no administration in time range)   lactulose oral solution 20 g (has no administration in time range)   midodrine (PROAMATINE) tablet 5 mg (5 mg Oral Given 6/1/22 1310)   calcium gluconate 2 g in sodium chloride 0 9% 100 mL (premix) (0 g Intravenous Stopped 6/1/22 1243)   dextrose 50 % IV solution 25 mL (25 mL Intravenous Given 6/1/22 1119)   insulin regular (HumuLIN R,NovoLIN R) injection 10 Units (10 Units Intravenous Given 6/1/22 1121)   albumin human (FLEXBUMIN) 25 % injection 25 g (0 g Intravenous Stopped 6/1/22 1439)       Diagnostic Studies  Results Reviewed     Procedure Component Value Units Date/Time    HS Troponin I 2hr [[id number]]  (Normal) Collected: 06/01/22 1237    Lab Status: Final result Specimen: Blood from Arm, Left Updated: 06/01/22 1315     hs TnI 2hr 26 ng/L      Delta 2hr hsTnI -2 ng/L     Magnesium [[id number]]  (Normal) Collected: 06/01/22 0940    Lab Status: Final result Specimen: Blood from Arm, Right Updated: 06/01/22 1207     Magnesium 2 5 mg/dL     Phosphorus [[id number]]  (Abnormal) Collected: 06/01/22 0940    Lab Status: Final result Specimen: Blood from Arm, Right Updated: 06/01/22 1207     Phosphorus 4 3 mg/dL     HS Troponin I 4hr [[id number]]     Lab Status: No result Specimen: Blood     Fingerstick Glucose (POCT) [[id number]]  (Normal) Collected: 06/01/22 1102    Lab Status: Final result Updated: 06/01/22 1103     POC Glucose 100 mg/dl     Ammonia [[id number]]  (Abnormal) Collected: 06/01/22 0940    Lab Status: Final result Specimen: Blood from Arm, Right Updated: 06/01/22 1050     Ammonia 398 umol/L     Comprehensive metabolic panel [[id number]]  (Abnormal) Collected: 06/01/22 0940    Lab Status: Final result Specimen: Blood from Arm, Right Updated: 06/01/22 1049     Sodium 133 mmol/L      Potassium 6 7 mmol/L      Chloride 100 mmol/L      CO2 24 mmol/L      ANION GAP 9 mmol/L      BUN 75 mg/dL      Creatinine 10 63 mg/dL      Glucose 100 mg/dL      Calcium 8 5 mg/dL      Corrected Calcium 9 2 mg/dL      AST 37 U/L      ALT 25 U/L      Alkaline Phosphatase 101 U/L      Total Protein 7 5 g/dL      Albumin 3 1 g/dL      Total Bilirubin 1 37 mg/dL      eGFR 4 ml/min/1 73sq m     Narrative:      [place] guidelines for Chronic Kidney Disease (CKD):     Stage 1 with normal or high GFR (GFR > 90 mL/min/1 73 square meters)    Stage 2 Mild CKD (GFR = 60-89 mL/min/1 73 square meters)    Stage 3A Moderate CKD (GFR = 45-59 mL/min/1 73 square meters)    Stage 3B Moderate CKD (GFR = 30-44 mL/min/1 73 square meters)    Stage 4 Severe CKD (GFR = 15-29 mL/min/1 73 square meters)    Stage 5 End Stage CKD (GFR <15 mL/min/1 73 square meters)  Note: GFR calculation is accurate only with a steady state creatinine    Urinalysis with microscopic [[id number]]     Lab Status: No result Specimen: Urine     HS Troponin 0hr (reflex protocol) [[id number]]  (Normal) Collected: 06/01/22 0940    Lab Status: Final result Specimen: Blood from Arm, Right Updated: 06/01/22 1037     hs TnI 0hr 28 ng/L     Lipase [[id number]]  (Normal) Collected: 06/01/22 0940    Lab Status: Final result Specimen: Blood from Arm, Right Updated: 06/01/22 1033     Lipase 50 u/L     CBC and differential [[id number]]  (Abnormal) Collected: 06/01/22 0940    Lab Status: Final result Specimen: Blood from Arm, Right Updated: 06/01/22 1032     WBC 5 49 Thousand/uL      RBC 2 88 Million/uL      Hemoglobin 9 6 g/dL      Hematocrit 29 8 %       fL      MCH 33 3 pg      MCHC 32 2 g/dL      RDW 16 1 %      MPV 11 5 fL      Platelets 85 Thousands/uL      nRBC 0 /100 WBCs      Neutrophils Relative 68 %      Immat GRANS % 0 %      Lymphocytes Relative 16 %      Monocytes Relative 14 %      Eosinophils Relative 2 %      Basophils Relative 0 %      Neutrophils Absolute 3 69 Thousands/µL      Immature Grans Absolute 0 02 Thousand/uL      Lymphocytes Absolute 0 89 Thousands/µL      Monocytes Absolute 0 77 Thousand/µL      Eosinophils Absolute 0 10 Thousand/µL      Basophils Absolute 0 02 Thousands/µL     Protime-INR [[id number]]  (Abnormal) Collected: 06/01/22 0940    Lab Status: Final result Specimen: Blood from Arm, Right Updated: 06/01/22 1030     Protime 19 1 seconds      INR 1 62    APTT [[id number]]  (Abnormal) Collected: 06/01/22 0940    Lab Status: Final result Specimen: Blood from Arm, Right Updated: 06/01/22 1030     PTT 45 seconds     Blood gas, venous [[id number]]  (Abnormal) Collected: 06/01/22 0940    Lab Status: Final result Specimen: Blood from Arm, Right Updated: 06/01/22 1011     pH, Romero 7 360     pCO2, Romero 40 8 mm Hg      pO2, Romero 37 0 mm Hg      HCO3, Romero 22 5 mmol/L      Base Excess, Romero -2 7 mmol/L      O2 Content, Romero 9 2 ml/dL      O2 HGB, VENOUS 66 0 %     Stool Enteric Bacterial Panel by PCR [[id number]]     Lab Status: No result Specimen: Stool     Clostridium difficile toxin by PCR with EIA [[id number]]     Lab Status: No result Specimen: Stool                  CT abdomen pelvis wo contrast   Final Result by Mustapha Pickering MD (06/01 1600)      1  Hepatic cirrhosis and third spacing with partially imaged probable pulmonary edema and small-to-moderate right and trace left pleural effusions, anasarca and small volume ascites  2   Wall thickening of the small bowel and ascending colon may be due to portal enteropathy/colopathy or infectious enteritis/colitis  3   Pancreatic atrophy which is less pronounced at the pancreatic head  No discernible pancreatic head mass is definitively identified on this unenhanced study  The March 31, 2022 contrast enhancement CT abdomen and pelvis report from [facility] in [facility] was reviewed (images were unavailable time of study interpretation)  Said study recommended follow-up MRI of the pancreas for assessment for an underlying pancreatic lesion  Workstation performed: [id number]         XR chest 1 view portable   ED Interpretation by Nick Gupta DO (06/01 1012)   Cardiomegaly with small bilateral pleural effusions      Final Result by Harlow Boas, MD (06/01 1245)   Persistent thoracic aortic dilatation, cardiomegaly      No acute cardiopulmonary disease  Workstation performed: [id number]         MRI inpatient order    (Results Pending)              Procedures  ECG 12 Lead Documentation Only    Date/Time: 6/1/2022 9:38 AM  Performed by: Nick Gupta DO  Authorized by: Nick Gupta DO     Indications / Diagnosis:  Weakness  ECG reviewed by me, the ED Provider: yes    Patient location:  ED  Previous ECG:     Previous ECG:  Compared to current    Comparison ECG info:   May 12, 2022    Similarity:  Changes noted  Interpretation:     Interpretation: abnormal    Rate:     ECG rate:  54    ECG rate assessment: bradycardic    Rhythm:     Rhythm: sinus bradycardia    Ectopy:     Ectopy: none    QRS:     QRS axis:  Normal  Other findings:     Other findings: prolonged qTc interval      CriticalCare Time  Performed by: Armando Nichols DO  Authorized by: Armando Nichols DO     Critical care provider statement:     Critical care time (minutes):  30    Critical care was necessary to treat or prevent imminent or life-threatening deterioration of the following conditions:  Metabolic crisis and renal failure    Critical care was time spent personally by me on the following activities:  Blood draw for specimens, obtaining history from patient or surrogate, development of treatment plan with patient or surrogate, discussions with consultants, evaluation of patient's response to treatment, examination of patient, ordering and performing treatments and interventions, ordering and review of laboratory studies, ordering and review of radiographic studies, re-evaluation of patient's condition and review of old charts    I assumed direction of critical care for this patient from another provider in my specialty: no               ED Course  ED Course as of 06/01/22 1730   Wed Jun 01, 2022   1058 Nurse for HD to be at bedside in 40-50 min                                             MDM  Number of Diagnoses or Management Options  Acute hepatic encephalopathy: new and requires workup  Colitis: new and requires workup  Diarrhea: new and requires workup  Hyperkalemia: new and requires workup  Lethargy: new and requires workup     Amount and/or Complexity of Data Reviewed  Clinical lab tests: reviewed and ordered  Tests in the radiology section of CPT®: ordered and reviewed  Decide to obtain previous medical records or to obtain history from someone other than the patient: yes  Obtain history from someone other than the patient: yes  Discuss the patient with other providers: yes  Independent visualization of images, tracings, or specimens: yes        Disposition  Final diagnoses:   Hyperkalemia   Lethargy   Diarrhea   Acute hepatic encephalopathy   Colitis     Time reflects when diagnosis was documented in both MDM as applicable and the Disposition within this note     Time User Action Codes Description Comment    6/1/2022 12:50 PM White, Lorene Add [E87 5] Hyperkalemia     6/1/2022 12:50 PM White, Lorene Add [R53 83] Lethargy     6/1/2022 12:50 PM White, Lorene Add [R19 7] Diarrhea     6/1/2022 12:50 PM White, Lorene Add [K72 00] Acute hepatic encephalopathy     6/1/2022  1:08 PM [de-identified], Debora Hansen Add [N18 6] ESRD (end stage renal disease) ([facility] )     6/1/2022  2:30 PM Kim Alejandra Add [K75 81,  K74 60] 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 ([facility] )     6/1/2022  2:30 PM Veldon Huger [K86 89] Pancreatic mass     6/1/2022  5:28 PM Chela Franz Add [K52 9] Colitis       ED Disposition     ED Disposition   Admit    Condition   Stable    Date/Time   Wed Jun 1, 2022 12:49 PM    Comment   Case was discussed with Dr Nan Leal and the patient's admission status was agreed to be Admission Status: inpatient status to the service of Dr Nan Leal   Follow-up Information    None         Current Discharge Medication List      CONTINUE these medications which have NOT CHANGED    Details   apixaban (Eliquis) 2 5 mg Take 1 tablet (2 5 mg total) by mouth 2 (two) times a day  Qty: 180 tablet, Refills: 3    Associated Diagnoses: Paroxysmal atrial fibrillation (HCC)      atorvastatin (LIPITOR) 40 mg tablet TAKE 1 TABLET (40 MG TOTAL) BY MOUTH DAILY  Qty: 90 tablet, Refills: 0    Associated Diagnoses: Dissection of thoracic aorta (HCC); S/P aorta repair; Acute kidney injury superimposed on chronic kidney disease ([facility] [address] ); AFSHIN (acute kidney injury) ([facility] )      ! ! docusate sodium (COLACE) 100 mg capsule Take 1 capsule (100 mg total) by mouth in the morning and 1 capsule (100 mg total) in the evening    Qty: 180 capsule, Refills: 0    Associated Diagnoses: Chronic kidney disease-mineral and bone disorder      !!  MG capsule TAKE 1 CAPSULE (100 MG TOTAL) BY MOUTH 2 (TWO) TIMES A DAY  Qty: 30 capsule, Refills: 3    Associated Diagnoses: Chronic kidney disease-mineral and bone disorder      gabapentin (NEURONTIN) 100 mg capsule TAKE 1 CAPSULE BY MOUTH DAILY IN THE EVENING      ketoconazole (NIZORAL) 2 % cream APPLY TOPICALLY DAILY  Qty: 30 g, Refills: 2    Associated Diagnoses: Intertrigo      lidocaine (LIDODERM) 5 % Apply 1 patch topically daily Remove & Discard patch within 12 hours or as directed by MD  Qty: 30 patch, Refills: 0    Associated Diagnoses: Left shoulder pain      lidocaine-prilocaine (EMLA) cream Apply topically as needed for mild pain (with HD sessions) With dialysis sessions  Qty: 30 g, Refills: 3    Associated Diagnoses: ESRD (end stage renal disease) (HCC)      metoprolol succinate (TOPROL-XL) 25 mg 24 hr tablet Take 1 tablet (25 mg total) by mouth 2 (two) times a day  Qty: 60 tablet, Refills: 0    Associated Diagnoses: Paroxysmal atrial fibrillation (HCC)      midodrine (PROAMATINE) 5 mg tablet Take 1 tablet (5 mg total) by mouth 3 (three) times a day before meals  Qty: 90 tablet, Refills: 0    Associated Diagnoses: ESRD (end stage renal disease) (HCC)      nystatin (MYCOSTATIN) powder Apply topically 3 (three) times a day as needed (rash)  Qty: 45 g, Refills: 1    Associated Diagnoses: Intertrigo      pantoprazole (PROTONIX) 40 mg tablet Take 1 tablet (40 mg total) by mouth daily in the early morning  Qty: 30 tablet, Refills: 2    Associated Diagnoses: Gastrointestinal hemorrhage, unspecified gastrointestinal hemorrhage type      bumetanide (BUMEX) 2 mg tablet TAKE 2 TABLETS (4 MG TOTAL) BY MOUTH 2 (TWO) TIMES A DAY       ! ! - Potential duplicate medications found  Please discuss with provider  No discharge procedures on file      PDMP Review       Value Time User    PDMP Reviewed  Yes 12/22/2021  1:56 PM Lewis Javed MD          ED Provider  Electronically Signed by           Jessica Hussein DO  06/01/22 1406

## 2022-06-01 NOTE — ED NOTES
Comfort and safety measures provided  Limb alert bracelet placed on Left arm due to fistula  Patient denies complaints at this time  Will monitor        Janna Stewart RN  06/01/22 7986

## 2022-06-01 NOTE — H&P
H&P Exam - [address] [de-identified] y o  male MRN: [id number]  Unit/Bed#: ED 09 Encounter: [id number]    Updated daughter over the phone  -------------------------------------------------------------------------------------------------------------  Chief Complaint:  Generalized weakness, edema and diarrhea    History of Present Illness   HX and PE limited by:  Confusion  Aaron Ureña is a [de-identified] y o  male who presents with generalized weakness and edema  Majority of the history was gotten from the daughter  PMH of HTN, HLD, paroxysmal atrial fibrillation on Eliquis, BPH, ESRD dialysis Monday Wednesday Friday, s/p aortic dissection repair, hypertension, hepatic cirrhosis based on records  Patient lives at home with wife and granddaughter  Patient daughter reported started on admission on 5/10 for similar presentation generalized weakness, diarrhea, missed dialysis  Was also found to have high ammonia 167 at that time  Per daughter Bumex was discontinued  Afterwards patient started having swelling on his legs, worsening generalized weakness, mental status change, poor appetite, mild shortness of breath, then patient developed nausea vomiting and diarrhea for the couple of 2-3 days  Patient is missed dialysis Monday and Wednesday due to not feeling well  Family did black tarry stool on previous admission but not this time  Patient daughter also reported that the patient gets back pain during dialysis  Patient was also found out to have a pancreatic mass in [place] in March CT abdomen pelvis    Recommended MRI which was ordered by PCP        History obtained from daughter Luis Nunes   -------------------------------------------------------------------------------------------------------------  Assessment and Plan:    Neuro:    Diagnosis:  Hepatic encephalopathy  Ammonia 398  o Plan:  Lactulose 10 g daily if no improvement consider rifaximin, neurochecks Q 4  o No improvement CT head        CV:    Diagnosis: Paroxysmal Atrial fibrillation/sinus bradycardia  o Plan:  Heart rate on the 50s with low blood pressure, hold metoprolol, resume Eliquis 2 5 b i d    Diagnosis:  Hypotension unknown etiology could be infection  o Plan: Midodrine 10 mg t i d , norepi to keep map more than 65   Diagnosis:  Diastolic heart failure  25/62/3631 EF of 32% grade 2 diastolic dysfunction mild stenosis of the aorta  o Plan:  Repeat  Echo    Pulm:   Diagnosis:  Low oxygen saturation 98%/volume overload  o Plan:  Dialysis, oxygen to keep sats above 90%      GI:    Diagnosis:  Hepatics cirrhosis  o Plan:  PT/INR, liver panel, ammonia Check, lactulose   Diagnosis:  Nausea vomiting and diarrhea/likely viral illness  o Plan:  Stool enteric panel, C diff, TIGAN for nausea due to prolonged QT   Diagnosis:  Pancreatic mass  o Plan:  Patient family requesting the MRI to be done in the hospital, GI consult    :    Diagnosis:  Decreased urine output  o Plan:  Strict I&O and daily weight, UA   Diagnosis:  Will monitor his stools or any bleeding, per daughter no reports of bleeding that was previous admission  o Plan:  Monitor stool output    F/E/N:    Plan:  Hyperkalemia   Patient received calcium gluconate, dextrose and insulin IV   Recheck post dialysis      Heme/Onc:    Diagnosis:  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  o Plan:  CBC check in the morning        Endo:   o Diagnosis:  Unremarkable      ID:    Diagnosis:  Due to hypotension, diarrhea, concern for GI pathology  o Plan:  Ceftriaxone and metronidazole   CT abdomen/ pelvis        MSK/Skin:    Diagnosis:  Generalized weakness  o Plan:  PT/OT        Disposition: Admit to Stepdown Level 1  Code Status: Level 2 - DNAR: but accepts endotracheal intubation  --------------------------------------------------------------------------------------------------------------  Review of Systems   Unable to perform ROS: Mental status change   Constitutional: Positive for activity change, fatigue and unexpected weight change  Gastrointestinal: Positive for nausea and vomiting  Neurological: Positive for weakness  All other systems reviewed and are negative  A 12-point, complete review of systems was reviewed and negative except as stated above     Physical Exam  Vitals reviewed  HENT:      Head: Normocephalic  Nose: Nose normal       Mouth/Throat:      Mouth: Mucous membranes are moist    Eyes:      Extraocular Movements: Extraocular movements intact  Conjunctiva/sclera: Conjunctivae normal       Pupils: Pupils are equal, round, and reactive to light  Cardiovascular:      Rate and Rhythm: Regular rhythm  Bradycardia present  Pulses: Normal pulses  Heart sounds: Normal heart sounds  Pulmonary:      Comments: Bilateral crackles  Abdominal:      General: Bowel sounds are normal       Palpations: Abdomen is soft  Musculoskeletal:      Right lower leg: Edema (Plus two) present  Left lower leg: Edema (Plus two) present  Skin:     General: Skin is warm  Capillary Refill: Capillary refill takes less than 2 seconds  Neurological:      General: No focal deficit present  Comments: Alert to person, place not to time, altered       --------------------------------------------------------------------------------------------------------------  Vitals:   Vitals:    06/01/22 1015 06/01/22 1130 06/01/22 1200 06/01/22 1230   BP: (!) 73/35 (!) 85/47 (!) 71/41 90/52   BP Location:  Right arm Right arm Right arm   Pulse: (!) 50 (!) 51 59 56   Resp: 16 17 16    Temp:   98 2 °F (36 8 °C)    TempSrc:   Oral    SpO2:  98%  (!) 88%   Height:         Temp  Min: 98 2 °F (36 8 °C)  Max: 98 6 °F (37 °C)  IBW (Ideal Body Weight): 66 1 kg  Height: 5' 7" (170 2 cm)  Body mass index is 28 98 kg/m²    N/A    Laboratory and Diagnostics:  Results from last 7 days   Lab Units 06/01/22  0940   WBC Thousand/uL 5 49   HEMOGLOBIN g/dL 9 6*   HEMATOCRIT % 29 8*   PLATELETS Thousands/uL 85*   NEUTROS PCT % 68   MONOS PCT % 14*     Results from last 7 days   Lab Units 06/01/22  0940   SODIUM mmol/L 133*   POTASSIUM mmol/L 6 7*   CHLORIDE mmol/L 100   CO2 mmol/L 24   ANION GAP mmol/L 9   BUN mg/dL 75*   CREATININE mg/dL 10 63*   CALCIUM mg/dL 8 5   GLUCOSE RANDOM mg/dL 100   ALT U/L 25   AST U/L 37   ALK PHOS U/L 101   ALBUMIN g/dL 3 1*   TOTAL BILIRUBIN mg/dL 1 37*     Results from last 7 days   Lab Units 06/01/22  0940   MAGNESIUM mg/dL 2 5   PHOSPHORUS mg/dL 4 3*      Results from last 7 days   Lab Units 06/01/22  0940   INR  1 62*   PTT seconds 45*              ABG:    VBG:  Results from last 7 days   Lab Units 06/01/22  0940   PH HARJEET  7 360   PCO2 HARJEET mm Hg 40 8*   PO2 HARJEET mm Hg 37 0   HCO3 HARJEET mmol/L 22 5*   BASE EXC HARJEET mmol/L -2 7           Micro:        EKG:  Sinus bradycardia with prolonged QT  Imaging: I have personally reviewed pertinent reports        Historical Information   Past Medical History:   Diagnosis Date    Acute renal failure superimposed on stage 4 chronic kidney disease ([facility] ) 10/4/2020    Arthritis     BPH without urinary obstruction     CKD (chronic kidney disease), stage III (HCC)     baseline 1 6-1 7    Dialysis patient ([place] [address] )     GERD (gastroesophageal reflux disease)     Hyperlipidemia     Hypertension     MI (myocardial infarction) ([place] [address] )      Past Surgical History:   Procedure Laterality Date    APPENDECTOMY      CARDIAC SURGERY      COLONOSCOPY  2017    FRACTURE SURGERY      IR AV FISTULA/GRAFT DECLOT  9/3/2021    IR AV FISTULA/GRAFT DECLOT  9/30/2021    IR AV FISTULA/GRAFT DECLOT  1/7/2022    IR PERITONEAL DIALYSIS CATHETER PLACEMENT  2/3/2021    IR PERITONEAL DIALYSIS CATHETER REMOVAL  2/17/2021    IR PERITONEAL DIALYSIS CATHETER REMOVAL AND PLACEMENT NEW SITE  2/9/2021    IR TEMPORARY DIALYSIS CATHETER PLACEMENT  12/23/2019    IR THORACENTESIS  12/24/2019    IR THORACENTESIS  12/27/2019    IR TUNNELED CENTRAL LINE REMOVAL  4/27/2021    IR TUNNELED DIALYSIS CATHETER CHECK/CHANGE/REPOSITION/ANGIOPLASTY  1/6/2020    IR TUNNELED DIALYSIS CATHETER PLACEMENT  1/2/2020    IR TUNNELED DIALYSIS CATHETER PLACEMENT  2/15/2021    IR TUNNELED DIALYSIS CATHETER REMOVAL  3/4/2020    AL ANASTOMOSIS,AV,ANY SITE Left 3/26/2021    Procedure: LEFT FOREARM LOOP AV GRAFT;  Surgeon: Phuong Cadena MD;  [facility]: [facility];  Service: Vascular    AL ASCEND AORTA GRAFT W ROOT REPLACMENT  VALVE CONDUIT/CORON RECONSTRUCT N/A 12/15/2019    Procedure: BENTALL PROCEDURE (ASCENDING AORTIC REPAIR) with 26mm Gelweave Graft;  Surgeon: Naveen Olivera DO;  [facility];  Service: Cardiac Surgery    AL RECONSTR TOTAL SHOULDER IMPLANT Right 12/5/2017    Procedure: ARTHROPLASTY SHOULDER REVERSE with OPEN CYST EXCISION;  Surgeon: Pauline Smith MD;  [facility];  Service: Orthopedics    SHOULDER SURGERY      WRIST SURGERY       Social History   Social History     Substance and Sexual Activity   Alcohol Use Not Currently     Social History     Substance and Sexual Activity   Drug Use Not Currently     Social History     Tobacco Use   Smoking Status Never Smoker   Smokeless Tobacco Never Used       Family History:   Family History   Problem Relation Age of Onset    No Known Problems Mother     Hypertension Father     Arthritis Family     No Known Problems Daughter      I have reviewed this patient's family history and commented on sigificant items within the HPI      Medications:  Current Facility-Administered Medications   Medication Dose Route Frequency    [START ON 6/2/2022] apixaban (ELIQUIS) tablet 2 5 mg  2 5 mg Oral BID    atorvastatin (LIPITOR) tablet 40 mg  40 mg Oral Daily    doxercalciferol (HECTOROL) injection 3 mcg  3 mcg Intravenous After Dialysis    lactulose oral solution 10 g  10 g Oral Daily    midodrine (PROAMATINE) tablet 10 mg  10 mg Oral TID AC    [START ON 6/2/2022] pantoprazole (PROTONIX) EC tablet 40 mg  40 mg Oral Early Morning     Home medications:  Prior to Admission Medications   Prescriptions Last Dose Informant Patient Reported? Taking?     MG capsule 6/1/2022 at Unknown time  No Yes   Sig: TAKE 1 CAPSULE (100 MG TOTAL) BY MOUTH 2 (TWO) TIMES A DAY   apixaban (Eliquis) 2 5 mg 6/1/2022 at Unknown time  No Yes   Sig: Take 1 tablet (2 5 mg total) by mouth 2 (two) times a day   atorvastatin (LIPITOR) 40 mg tablet 6/1/2022 at Unknown time  No Yes   Sig: TAKE 1 TABLET (40 MG TOTAL) BY MOUTH DAILY   bumetanide (BUMEX) 2 mg tablet Not Taking at Unknown time  Yes No   Sig: TAKE 2 TABLETS (4 MG TOTAL) BY MOUTH 2 (TWO) TIMES A DAY   Patient not taking: Reported on 6/1/2022   docusate sodium (COLACE) 100 mg capsule 6/1/2022 at Unknown time  No Yes   Sig: Take 1 capsule (100 mg total) by mouth in the morning and 1 capsule (100 mg total) in the evening    gabapentin (NEURONTIN) 100 mg capsule 6/1/2022 at Unknown time  Yes Yes   Sig: TAKE 1 CAPSULE BY MOUTH DAILY IN THE EVENING   ketoconazole (NIZORAL) 2 % cream 6/1/2022 at Unknown time  No Yes   Sig: APPLY TOPICALLY DAILY   lidocaine (LIDODERM) 5 % 6/1/2022 at Unknown time  No Yes   Sig: Apply 1 patch topically daily Remove & Discard patch within 12 hours or as directed by MD   lidocaine-prilocaine (EMLA) cream 6/1/2022 at Unknown time  No Yes   Sig: Apply topically as needed for mild pain (with HD sessions) With dialysis sessions   metoprolol succinate (TOPROL-XL) 25 mg 24 hr tablet 6/1/2022 at Unknown time  No Yes   Sig: Take 1 tablet (25 mg total) by mouth 2 (two) times a day   midodrine (PROAMATINE) 5 mg tablet 6/1/2022 at Unknown time  No Yes   Sig: Take 1 tablet (5 mg total) by mouth 3 (three) times a day before meals   nystatin (MYCOSTATIN) powder 6/1/2022 at Unknown time  No Yes   Sig: Apply topically 3 (three) times a day as needed (rash)   pantoprazole (PROTONIX) 40 mg tablet 6/1/2022 at Unknown time  No Yes   Sig: Take 1 tablet (40 mg total) by mouth daily in the early morning Facility-Administered Medications: None     Allergies:  No Known Allergies  ------------------------------------------------------------------------------------------------------------  Advance Directive and Living Will:      Power of :    POLST:    ------------------------------------------------------------------------------------------------------------  Anticipated Length of Stay is > 2 midnights    Care Time Delivered:   Upon my evaluation, this patient had a high probability of imminent or life-threatening deterioration due to ESRD/ Hyperkalemia/Hypotension, which required my direct attention, intervention, and personal management  I have personally provided 30 minutes (15 to 30) of critical care time, exclusive of procedures, teaching, family meetings, and any prior time recorded by providers other than myself  Shannon Limon MD        Portions of the record may have been created with voice recognition software  Occasional wrong word or "sound a like" substitutions may have occurred due to the inherent limitations of voice recognition software    Read the chart carefully and recognize, using context, where substitutions have occurred

## 2022-06-01 NOTE — PLAN OF CARE
Problem: METABOLIC, FLUID AND ELECTROLYTES - ADULT  Goal: Electrolytes maintained within normal limits  Description: INTERVENTIONS:  - Monitor labs and assess patient for signs and symptoms of electrolyte imbalances  - Administer electrolyte replacement as ordered  - Monitor response to electrolyte replacements, including repeat lab results as appropriate  - Instruct patient on fluid and nutrition as appropriate  Outcome: Progressing  Goal: Fluid balance maintained  Description: INTERVENTIONS:  - Monitor labs   - Monitor I/O and WT  - Instruct patient on fluid and nutrition as appropriate  - Assess for signs & symptoms of volume excess or deficit  Outcome: Progressing     Problem: HEMATOLOGIC - ADULT  Goal: Maintains hematologic stability  Description: INTERVENTIONS  - Assess for signs and symptoms of bleeding or hemorrhage  - Monitor labs  - Administer supportive blood products/factors as ordered and appropriate  Outcome: Progressing

## 2022-06-01 NOTE — PLAN OF CARE
Post-Dialysis RN Treatment Note    Blood Pressure:  Pre: 71/41mm/Hg  Post: 70/45mmHg   EDW: TBD    Weight:  Unable to weigh patient on stretcher   Mode of weight measurement: N/A   Volume Removed: 800 ml    Treatment duration: 160 minutes    NS given  No    Treatment shortened?  No   Medications given during Rx: Albumin and Midodrine   Estimated Kt/V  Not Applicable   Access type: AV graft   Access Issues: No    Report called to primary nurse   Yes, Misty    Problem: METABOLIC, FLUID AND ELECTROLYTES - ADULT  Goal: Electrolytes maintained within normal limits  Description: INTERVENTIONS:  - Monitor labs and assess patient for signs and symptoms of electrolyte imbalances  - Administer electrolyte replacement as ordered  - Monitor response to electrolyte replacements, including repeat lab results as appropriate  - Instruct patient on fluid and nutrition as appropriate  6/1/2022 1546 by Sharron Shrestha RN  Outcome: Progressing  6/1/2022 1222 by Sharron Shrestha RN  Outcome: Progressing  Goal: Fluid balance maintained  Description: INTERVENTIONS:  - Monitor labs   - Monitor I/O and WT  - Instruct patient on fluid and nutrition as appropriate  - Assess for signs & symptoms of volume excess or deficit  6/1/2022 1546 by Sharron Shrestha RN  Outcome: Progressing  6/1/2022 1222 by Sharron Shrestha RN  Outcome: Progressing     Problem: HEMATOLOGIC - ADULT  Goal: Maintains hematologic stability  Description: INTERVENTIONS  - Assess for signs and symptoms of bleeding or hemorrhage  - Monitor labs  - Administer supportive blood products/factors as ordered and appropriate  6/1/2022 1546 by Sharron Shrestha RN  Outcome: Progressing  6/1/2022 1222 by Sharron Shrestha RN  Outcome: Progressing

## 2022-06-02 PROBLEM — K52.9 COLITIS: Status: ACTIVE | Noted: 2022-01-01

## 2022-06-02 PROBLEM — A41.9 SEPTIC SHOCK (HCC): Status: ACTIVE | Noted: 2022-01-01

## 2022-06-02 PROBLEM — R65.21 SEPTIC SHOCK (HCC): Status: ACTIVE | Noted: 2022-01-01

## 2022-06-02 NOTE — PLAN OF CARE
Problem: METABOLIC, FLUID AND ELECTROLYTES - ADULT  Goal: Electrolytes maintained within normal limits  Description: INTERVENTIONS:  - Monitor labs and assess patient for signs and symptoms of electrolyte imbalances  - Administer electrolyte replacement as ordered  - Monitor response to electrolyte replacements, including repeat lab results as appropriate  - Instruct patient on fluid and nutrition as appropriate  Outcome: Progressing  Goal: Fluid balance maintained  Description: INTERVENTIONS:  - Monitor labs   - Monitor I/O and WT  - Instruct patient on fluid and nutrition as appropriate  - Assess for signs & symptoms of volume excess or deficit  Outcome: Progressing     Problem: HEMATOLOGIC - ADULT  Goal: Maintains hematologic stability  Description: INTERVENTIONS  - Assess for signs and symptoms of bleeding or hemorrhage  - Monitor labs  - Administer supportive blood products/factors as ordered and appropriate  Outcome: Progressing     Problem: MOBILITY - ADULT  Goal: Maintain or return to baseline ADL function  Description: INTERVENTIONS:  -  Assess patient's ability to carry out ADLs; assess patient's baseline for ADL function and identify physical deficits which impact ability to perform ADLs (bathing, care of mouth/teeth, toileting, grooming, dressing, etc )  - Assess/evaluate cause of self-care deficits   - Assess range of motion  - Assess patient's mobility; develop plan if impaired  - Assess patient's need for assistive devices and provide as appropriate  - Encourage maximum independence but intervene and supervise when necessary  - Involve family in performance of ADLs  - Assess for home care needs following discharge   - Consider OT consult to assist with ADL evaluation and planning for discharge  - Provide patient education as appropriate  Outcome: Progressing  Goal: Maintains/Returns to pre admission functional level  Description: INTERVENTIONS:  - Perform BMAT or MOVE assessment daily    - Set and communicate daily mobility goal to care team and patient/family/caregiver  - Collaborate with rehabilitation services on mobility goals if consulted  - Perform Range of Motion 3 times a day  - Reposition patient every 2 hours    - Dangle patient 2 times a day  - Stand patient 2 times a day  - Ambulate patient 2 times a day  - Out of bed to chair 2 times a day   - Out of bed for meals 2 times a day  - Out of bed for toileting  - Record patient progress and toleration of activity level   Outcome: Progressing     Problem: Potential for Falls  Goal: Patient will remain free of falls  Description: INTERVENTIONS:  - Educate patient/family on patient safety including physical limitations  - Instruct patient to call for assistance with activity   - Consult OT/PT to assist with strengthening/mobility   - Keep Call bell within reach  - Keep bed low and locked with side rails adjusted as appropriate  - Keep care items and personal belongings within reach  - Initiate and maintain comfort rounds  - Make Fall Risk Sign visible to staff  - Offer Toileting every 2 Hours, in advance of need  - Initiate/Maintain chair alarm  - Obtain necessary fall risk management equipment:   - Apply yellow socks and bracelet for high fall risk patients  - Consider moving patient to room near nurses station  Outcome: Progressing

## 2022-06-02 NOTE — CONSULTS
Consultation - [address] [facility]  Chel SEBASTIAN [facility] [de-identified] y o  male MRN: [id number]  Unit/Bed#: ICU 10 Encounter: [id number]        Inpatient consult to gastroenterology  Consult performed by: Nga Neville PA-C  Consult ordered by: Francisco Javier George MD          Reason for Consult / Principal Problem: Acute hepatic encephalopathy    HPI: Jane Cavazos is a [de-identified]y o  year old male with history of end-stage renal disease on hemodialysis, [place] Aures, aortic dissection status post repair, atrial fibrillation anticoagulated with Eliquis presented to the hospital yesterday with concerns for generalized weakness, he had missed reportedly two dialysis appointments  He had also been reporting profuse diarrhea; stool C diff was checked here showing positivity with negative EIA, he was started on vancomycin here  His family had reported a relatively new diagnosis of liver disease which was in the process of being worked up as an outpatient  He appears awake and oriented at this time, answering questions appropriately, nursing reports his mentation appears substantially better today than it was yesterday  He was hypotensive and diagnosed with sepsis, is currently being weaned down from pressors, receiving oral vancomycin and IV Flagyl  CT scan yesterday showed no substantial ascites, but we do appear to be evidence of hepatic cirrhosis, pancreatic atrophy; it was reported that patient had been recommended for an MRI to rule out a pancreatic mass given findings from a CT scan from March from [facility], so he is currently ordered for an MRI at this time  Otherwise the patient denies any abdominal pain currently, any nausea, any rectal bleeding or melena  Last EGD and colonoscopy were done about 6 months ago in December 2021, colonoscopy seeing the removal of a large polyp in the hepatic flexure, EGD was normal showing no evidence of portal gastropathy or gastroesophageal varices      REVIEW OF SYSTEMS:    CONSTITUTIONAL: Denies any fever, chills, or rigors  Good appetite, and no recent weight loss  HEENT: No earache or tinnitus  Denies hearing loss or visual disturbances  CARDIOVASCULAR: No chest pain or palpitations  RESPIRATORY: Denies any cough, hemoptysis, shortness of breath or dyspnea on exertion  GASTROINTESTINAL: As noted in the History of Present Illness  GENITOURINARY: No problems with urination  Denies any hematuria or dysuria  NEUROLOGIC: No dizziness or vertigo, denies headaches  MUSCULOSKELETAL: Denies any muscle or joint pain  SKIN: Denies skin rashes or itching  ENDOCRINE: Denies excessive thirst  Denies intolerance to heat or cold  PSYCHOSOCIAL: Denies depression or anxiety  Denies any recent memory loss         Historical Information   Past Medical History:   Diagnosis Date    Acute renal failure superimposed on stage 4 chronic kidney disease ([facility] ) 10/4/2020    Arthritis     BPH without urinary obstruction     CKD (chronic kidney disease), stage III (HCC)     baseline 1 6-1 7    Dialysis patient ([place] [id number] )     GERD (gastroesophageal reflux disease)     Hyperlipidemia     Hypertension     MI (myocardial infarction) ([place] [address] )      Past Surgical History:   Procedure Laterality Date    APPENDECTOMY      CARDIAC SURGERY      COLONOSCOPY  2017    FRACTURE SURGERY      IR AV FISTULA/GRAFT DECLOT  9/3/2021    IR AV FISTULA/GRAFT DECLOT  9/30/2021    IR AV FISTULA/GRAFT DECLOT  1/7/2022    IR PERITONEAL DIALYSIS CATHETER PLACEMENT  2/3/2021    IR PERITONEAL DIALYSIS CATHETER REMOVAL  2/17/2021    IR PERITONEAL DIALYSIS CATHETER REMOVAL AND PLACEMENT NEW SITE  2/9/2021    IR TEMPORARY DIALYSIS CATHETER PLACEMENT  12/23/2019    IR THORACENTESIS  12/24/2019    IR THORACENTESIS  12/27/2019    IR TUNNELED CENTRAL LINE REMOVAL  4/27/2021    IR TUNNELED DIALYSIS CATHETER CHECK/CHANGE/REPOSITION/ANGIOPLASTY  1/6/2020    IR TUNNELED DIALYSIS CATHETER PLACEMENT  1/2/2020    IR TUNNELED DIALYSIS CATHETER PLACEMENT  2/15/2021    IR TUNNELED DIALYSIS CATHETER REMOVAL  3/4/2020    NM ANASTOMOSIS,AV,ANY SITE Left 3/26/2021    Procedure: LEFT FOREARM LOOP AV GRAFT;  Surgeon: Pushpa Arrieta MD;  Location: [facility];  Service: Vascular    NM ASCEND AORTA GRAFT W ROOT REPLACMENT  VALVE CONDUIT/CORON RECONSTRUCT N/A 12/15/2019    Procedure: BENTALL PROCEDURE (ASCENDING AORTIC REPAIR) with 26mm Gelweave Graft;  Surgeon: Edvin Linder DO;  [facility];  Service: Cardiac Surgery    NM RECONSTR TOTAL SHOULDER IMPLANT Right 12/5/2017    Procedure: ARTHROPLASTY SHOULDER REVERSE with OPEN CYST EXCISION;  Surgeon: Jamie Medina MD;  [facility];  Service: Orthopedics    SHOULDER SURGERY      WRIST SURGERY       Social History   Social History     Substance and Sexual Activity   Alcohol Use Not Currently     Social History     Substance and Sexual Activity   Drug Use Not Currently     Social History     Tobacco Use   Smoking Status Never Smoker   Smokeless Tobacco Never Used     Family History   Problem Relation Age of Onset    No Known Problems Mother     Hypertension Father     Arthritis Family     No Known Problems Daughter        Meds/Allergies     Medications Prior to Admission   Medication    apixaban (Eliquis) 2 5 mg    atorvastatin (LIPITOR) 40 mg tablet    docusate sodium (COLACE) 100 mg capsule     MG capsule    ketoconazole (NIZORAL) 2 % cream    lidocaine (LIDODERM) 5 %    lidocaine-prilocaine (EMLA) cream    metoprolol succinate (TOPROL-XL) 25 mg 24 hr tablet    midodrine (PROAMATINE) 5 mg tablet    nystatin (MYCOSTATIN) powder    pantoprazole (PROTONIX) 40 mg tablet    bumetanide (BUMEX) 2 mg tablet     Current Facility-Administered Medications   Medication Dose Route Frequency    atorvastatin (LIPITOR) tablet 40 mg  40 mg Oral Daily    ceftriaxone (ROCEPHIN) 1 g/50 mL in dextrose IVPB  1,000 mg Intravenous Q24H    doxercalciferol (HECTOROL) injection 3 mcg  3 mcg Intravenous After Dialysis    epoetin uday (EPOGEN,PROCRIT) injection 2,400 Units  2,400 Units Intravenous After Dialysis    [START ON 6/3/2022] iron sucrose (VENOFER) 50 mg in sodium chloride 0 9 % 100 mL IVPB  50 mg Intravenous Weekly    lactulose oral solution 20 g  20 g Oral TID    metroNIDAZOLE (FLAGYL) IVPB (premix) 500 mg 100 mL  500 mg Intravenous Q8H    midodrine (PROAMATINE) tablet 10 mg  10 mg Oral TID AC    norepinephrine (LEVOPHED) 4 mg (STANDARD CONCENTRATION) IV in sodium chloride 0 9% 250 mL  1-30 mcg/min Intravenous Titrated    pantoprazole (PROTONIX) EC tablet 40 mg  40 mg Oral Early Morning    rifaximin (XIFAXAN) tablet 550 mg  550 mg Oral Q12H [facility]    trimethobenzamide (TIGAN) IM injection 200 mg  200 mg Intramuscular Q6H PRN    vancomycin (VANCOCIN) oral solution 125 mg  125 mg Oral Q12H [facility]       No Known Allergies        Objective     Blood pressure 95/50, pulse (!) 54, temperature 97 8 °F (36 6 °C), temperature source Oral, resp  rate 17, height 5' 7" (1 702 m), weight 84 4 kg (186 lb), SpO2 (!) 87 %  Intake/Output Summary (Last 24 hours) at 6/2/2022 1535  Last data filed at 6/2/2022 1401  Gross per 24 hour   Intake 1531 87 ml   Output --   Net 1531 87 ml         PHYSICAL EXAM     General Appearance:   Alert, cooperative, no distress, appears stated age    HEENT:   Normocephalic, atraumatic, anicteric      Neck:  Supple, symmetrical, trachea midline, no adenopathy;    thyroid: no enlargement/tenderness/nodules; no carotid  bruit or JVD    Lungs:   Clear to auscultation bilaterally; no rales, rhonchi or wheezing; respirations unlabored    Heart[de-identified]   S1 and S2 normal; regular rate and rhythm; no murmur, rub, or gallop     Abdomen:   Soft, non-tender, non-distended; normal bowel sounds; no masses, no organomegaly    Genitalia:   Deferred    Rectal:   Deferred    Extremities:  No cyanosis, clubbing or edema    Pulses:  2+ and symmetric all extremities    Skin:  Skin color, texture, turgor normal, no rashes or lesions    Lymph nodes:  No palpable cervical, axillary or inguinal lymphadenopathy        Lab Results:   Admission on 06/01/2022   Component Date Value    WBC 06/01/2022 5 49     RBC 06/01/2022 2 88 (A)    Hemoglobin 06/01/2022 9 6 (A)    Hematocrit 06/01/2022 29 8 (A)    MCV 06/01/2022 104 (A)    MCH 06/01/2022 33 3     MCHC 06/01/2022 32 2     RDW 06/01/2022 16 1 (A)    MPV 06/01/2022 11 5     Platelets 27/14/2855 85 (A)    nRBC 06/01/2022 0     Neutrophils Relative 06/01/2022 68     Immat GRANS % 06/01/2022 0     Lymphocytes Relative 06/01/2022 16     Monocytes Relative 06/01/2022 14 (A)    Eosinophils Relative 06/01/2022 2     Basophils Relative 06/01/2022 0     Neutrophils Absolute 06/01/2022 3 69     Immature Grans Absolute 06/01/2022 0 02     Lymphocytes Absolute 06/01/2022 0 89     Monocytes Absolute 06/01/2022 0 77     Eosinophils Absolute 06/01/2022 0 10     Basophils Absolute 06/01/2022 0 02     Protime 06/01/2022 19 1 (A)    INR 06/01/2022 1 62 (A)    PTT 06/01/2022 45 (A)    Sodium 06/01/2022 133 (A)    Potassium 06/01/2022 6 7 (A)    Chloride 06/01/2022 100     CO2 06/01/2022 24     ANION GAP 06/01/2022 9     BUN 06/01/2022 75 (A)    Creatinine 06/01/2022 10 63 (A)    Glucose 06/01/2022 100     Calcium 06/01/2022 8 5     Corrected Calcium 06/01/2022 9 2     AST 06/01/2022 37     ALT 06/01/2022 25     Alkaline Phosphatase 06/01/2022 101     Total Protein 06/01/2022 7 5     Albumin 06/01/2022 3 1 (A)    Total Bilirubin 06/01/2022 1 37 (A)    eGFR 06/01/2022 4     Lipase 06/01/2022 50     pH, Romero 06/01/2022 7 360     pCO2, Romero 06/01/2022 40 8 (A)    pO2, Romero 06/01/2022 37 0     HCO3, Romero 06/01/2022 22 5 (A)    Base Excess, Romero 06/01/2022 -2 7     O2 Content, Romero 06/01/2022 9 2     O2 HGB, VENOUS 06/01/2022 66 0      C difficile toxin by PC* 06/02/2022 Positive (A)    C difficile Toxins A+B, * 06/02/2022 Negative     hs TnI 0hr 06/01/2022 28     Ammonia 06/01/2022 398 (A)    Magnesium 06/01/2022 2 5     Phosphorus 06/01/2022 4 3 (A)    hs TnI 2hr 06/01/2022 26     Delta 2hr hsTnI 06/01/2022 -2     POC Glucose 06/01/2022 100     AV area peak chet 06/02/2022 1 4     LA size 06/02/2022 4 5     Aortic valve mean veloci* 06/02/2022 19 90     Triscuspid Valve Regurgi* 06/02/2022 17 0     Tricuspid valve peak reg* 06/02/2022 2 03     LVPWd 06/02/2022 1 10     MV E' Tissue Velocity Se* 06/02/2022 7     TR Peak Chet 06/02/2022 2 0     IVSd 06/02/2022 2 18     LV DIASTOLIC VOLUME (MOD* 78/33/1021 115     LEFT VENTRICLE SYSTOLIC * 75/59/5103 37     Left ventricular stroke * 06/02/2022 78 00     A4C EF 06/02/2022 67     LVIDd 06/02/2022 4 90     IVS 06/02/2022 1 1     LVIDS 06/02/2022 3 10     FS 06/02/2022 37     Asc Ao 06/02/2022 2 7     Ao root 06/02/2022 3 10     RVID d 06/02/2022 4 2     LVOT mn grad 06/02/2022 2 0     AV area by cont VTI 06/02/2022 1 4     AV mean gradient 06/02/2022 18     AV LVOT peak gradient 06/02/2022 4     MV valve area p 1/2 meth* 06/02/2022 2 72     PV peak gradient antegra* 06/02/2022 3     E wave deceleration time 06/02/2022 279     LVOT diameter 06/02/2022 2 2     LVOT peak chet 06/02/2022 1 02     LVOT peak VTI 06/02/2022 25 63     Ao peak chet retrograde 06/02/2022 2 75     Ao VTI 06/02/2022 69 2     LVOT stroke volume 06/02/2022 97 38     AV peak gradient 06/02/2022 30     MV Peak E Chet 06/02/2022 111     MV Peak A Chet 06/02/2022 0 53     ENRIQUETA A4C 06/02/2022 24 6     RAA A4C 06/02/2022 32 5     MV stenosis pressure 1/2* 06/02/2022 81     LVOT SI 06/02/2022 50 00     LVSV, 2D 06/02/2022 78     LVOT area 06/02/2022 3 80     AV Velocity Ratio 06/02/2022 0 37     AV valve area 06/02/2022 1 41     LV EF 06/02/2022 55     Est  RA pres 06/02/2022 15 0     Right Ventricular Peak S* 06/02/2022 32 00     Ammonia 06/01/2022 48     Sodium 06/01/2022 138     Potassium 06/01/2022 4 5     Chloride 06/01/2022 101     CO2 06/01/2022 28     ANION GAP 06/01/2022 9     BUN 06/01/2022 45 (A)    Creatinine 06/01/2022 7 27 (A)    Glucose 06/01/2022 74     Calcium 06/01/2022 8 6     eGFR 06/01/2022 6     Folate 06/01/2022 11 3     Hemoglobin 06/01/2022 9 2 (A)    Vitamin B-12 06/01/2022 1,719 (A)    Ammonia 06/02/2022 81 (A)    Sodium 06/02/2022 136     Potassium 06/02/2022 4 6     Chloride 06/02/2022 102     CO2 06/02/2022 20 (A)    ANION GAP 06/02/2022 14 (A)    BUN 06/02/2022 53 (A)    Creatinine 06/02/2022 7 80 (A)    Glucose 06/02/2022 75     Calcium 06/02/2022 8 5     Corrected Calcium 06/02/2022 9 6     AST 06/02/2022 33     ALT 06/02/2022 19     Alkaline Phosphatase 06/02/2022 81     Total Protein 06/02/2022 6 7     Albumin 06/02/2022 2 6 (A)    Total Bilirubin 06/02/2022 1 84 (A)    eGFR 06/02/2022 5     WBC 06/02/2022 5 44     RBC 06/02/2022 2 43 (A)    Hemoglobin 06/02/2022 8 1 (A)    Hematocrit 06/02/2022 26 3 (A)    MCV 06/02/2022 108 (A)    MCH 06/02/2022 33 3     MCHC 06/02/2022 30 8 (A)    RDW 06/02/2022 16 7 (A)    MPV 06/02/2022 11 8     Platelets 19/66/8378 83 (A)    nRBC 06/02/2022 0     Neutrophils Relative 06/02/2022 63     Immat GRANS % 06/02/2022 1     Lymphocytes Relative 06/02/2022 15     Monocytes Relative 06/02/2022 18 (A)    Eosinophils Relative 06/02/2022 3     Basophils Relative 06/02/2022 0     Neutrophils Absolute 06/02/2022 3 46     Immature Grans Absolute 06/02/2022 0 03     Lymphocytes Absolute 06/02/2022 0 80     Monocytes Absolute 06/02/2022 0 99     Eosinophils Absolute 06/02/2022 0 14     Basophils Absolute 06/02/2022 0 02     Magnesium 06/02/2022 2 2     Phosphorus 06/02/2022 4 6 (A)    Protime 06/02/2022 21 1 (A)    INR 06/02/2022 1 85 (A)    Ventricular Rate 06/01/2022 54     Atrial Rate 06/01/2022 54     MN Interval 06/01/2022 248     QRSD Interval 06/01/2022 82     QT Interval 06/01/2022 662     QTC Interval 06/01/2022 627     P Axis 06/01/2022 232     QRS Axis 06/01/2022 87     T Wave Axis 06/01/2022 45     Ventricular Rate 06/01/2022 54     Atrial Rate 06/01/2022 54     OH Interval 06/01/2022 208     QRSD Interval 06/01/2022 78     QT Interval 06/01/2022 552     QTC Interval 06/01/2022 523     P Axis 06/01/2022 81     QRS Axis 06/01/2022 82     T Wave Axis 06/01/2022 121        Imaging Studies: I have personally reviewed pertinent reports  CT ABDOMEN AND PELVIS WITHOUT IV CONTRAST     INDICATION:   Weakness, nausea, vomiting, diarrhea      COMPARISON:  CT chest abdomen pelvis December 27, 2019     TECHNIQUE:  CT examination of the abdomen and pelvis was performed without intravenous contrast  This examination was performed without intravenous contrast in the context of the critical nationwide Omnipaque shortage  Axial, sagittal, and coronal 2D   reformatted images were created from the source data and submitted for interpretation       Radiation dose length product (DLP) for this visit:  760 mGy-cm   This examination, like all CT scans performed in the [facility], was performed utilizing techniques to minimize radiation dose exposure, including the use of iterative   reconstruction and automated exposure control       Enteric contrast was not administered       FINDINGS:     ABDOMEN     LOWER CHEST:  Interlobular septal thickening  Small-to-moderate right and trace left pleural effusions  Cardiomegaly     LIVER/BILIARY TREE:  The liver demonstrates cirrhotic morphology  Punctate calcification in the right hepatic lobe  Within the limitations of this examination there is no evidence of suspicious hepatic mass  No biliary dilatation        GALLBLADDER:  Punctate focus of hyperattenuation in the gallbladder wall may represent a small gallstone    No findings to suggest acute cholecystitis      SPLEEN:  Unremarkable      PANCREAS: Atrophy of the pancreatic body and tail  Atrophy is lesion pronounced at the pancreatic head; no discernible pancreatic head mass identified  Main pancreatic duct is not dilated      ADRENAL GLANDS:  Unremarkable      KIDNEYS/URETERS:  One or more simple renal cyst(s) is noted  Renal cortical thinning  No hydronephrosis      STOMACH AND BOWEL:  Wall thickening of the small bowel and ascending colon  No bowel obstruction  Colonic diverticulosis      APPENDIX:  There are expected postoperative changes of appendectomy      ABDOMINOPELVIC CAVITY:  Small volume perihepatic ascites  Diffuse  mesenteric edema      VESSELS:  Atherosclerotic changes are present  No evidence of aortic aneurysm      PELVIS     REPRODUCTIVE ORGANS:  Hyperattenuating material within the prostate may represent surgical material, and is unchanged from 2019      URINARY BLADDER:  Nondistended, limiting assessment     ABDOMINAL WALL/INGUINAL REGIONS:  Anasarca     OSSEOUS STRUCTURES:  No acute fracture or destructive osseous lesion  Spinal degenerative changes are noted  Mild height loss of the L3 vertebral body is similar to 2019  Chronic fracture deformity of the inferior right pubic ramus      IMPRESSION:     1  Hepatic cirrhosis and third spacing with partially imaged probable pulmonary edema and small-to-moderate right and trace left pleural effusions, anasarca and small volume ascites  2   Wall thickening of the small bowel and ascending colon may be due to portal enteropathy/colopathy or infectious enteritis/colitis  3   Pancreatic atrophy which is less pronounced at the pancreatic head  No discernible pancreatic head mass is definitively identified on this unenhanced study  The March 31, 2022 contrast enhancement CT abdomen and pelvis report from [facility] was reviewed (images were unavailable time of study interpretation)    Said study recommended follow-up MRI of the pancreas for assessment for an underlying pancreatic lesion        ASSESSMENT/PLAN:     1  Liver cirrhosis, most likely infiltrative/LOPEZ, complicated by hepatic encephalopathy, patient also being treated for sepsis possibly related to C diff infection, for which he is on IV and p o  Antibiotics at this time  Mentation appears substantially improved with introduction of antibiotics and lactulose    -continue lactulose, titrate to 3-4 bowel movements daily, monitor mental status closely    -monitor meld labs daily    -follow-up on MRI for exclusion of pancreatic lesion    -continue vancomycin and Flagyl, monitor abdominal exam, temperature and white blood cell count    - if patient develops significant ascites, consider diagnostic paracentesis with fluid studies to exclude SBP      The patient was seen and examined by Dr Tomy Nielson, all key medical decisions were made with Dr Tomy Nielson  Thank you for allowing us to participate in the care of this pleasant patient  We will follow up with you closely

## 2022-06-02 NOTE — PROGRESS NOTES
Daily Progress Note - [address] [de-identified] y o  male MRN: [id number]  Unit/Bed#: ICU 10 Encounter: [id number]        ----------------------------------------------------------------------------------------  HPI/24hr events:  Had 1 bowel movement last night C diff was sent, wanted to go home last night, night team convinced to stay, patient is alert and oriented  Had Levophed drip running throughout the night  Norepinephrine 4mcg    ---------------------------------------------------------------------------------------  SUBJECTIVE  Patient has no subjective complain when asked    Review of Systems   Constitutional: Positive for fatigue and unexpected weight change  Gastrointestinal: Positive for diarrhea and vomiting  Neurological: Positive for weakness  All other systems reviewed and are negative  Review of systems was reviewed and negative unless stated above in HPI/24-hour events   ---------------------------------------------------------------------------------------  Assessment and Plan:    Neuro:   · Diagnosis:  Hepatic encephalopathy (resolved)  Ammonia 398  ? Plan:  lactulose 20 g b i d , increase to t i d  If no improvement a bowel movement, goal of 3 BM  ? Rifaximin 550 b i d              CV:   · Diagnosis:  Paroxysmal Atrial fibrillation/sinus bradycardia  ? Plan:  Heart rate on the 50s with low blood pressure, hold metoprolol, Hold Eliquis 2 5 b i d  for now will reassess agata  · Diagnosis:  Septic shock unknown etiology could be infection/volume depletion/3rd spacing  ? Plan:  increase Midodrine from 5 to  10 mg t i d , norepi to keep map more than 65, albumin 12 5 g x 1  · Diagnosis:  Diastolic heart failure  61/72/1030 EF of 61% grade 2 diastolic dysfunction mild stenosis of the aorta  ? Plan:  Repeat  Echo     Pulm:  · Diagnosis:  Low oxygen saturation 98%/volume overload (improved)  ? Plan:   currently on room air        GI:   · Diagnosis:  Hepatics cirrhosis  ?  Plan:  PT/INR, liver panel, ammonia Check, lactulose  ? GI consult  · Diagnosis:  Nausea vomiting and diarrhea/likely viral illness/colitis  ? Plan:  Stool enteric panel, C diff still waiting for results, Jorgeteagan Loving for nausea due to prolonged QT  · Diagnosis:  Pancreatic mass   ? Plan:   GI consult/MRI abdomen ordered     :   · Diagnosis:  Decreased urine output  ? Plan:  Strict I&O and daily weight, UA  · Diagnosis:  Will monitor his stools or any bleeding, per daughter no reports of bleeding that was previous admission  ? Plan:  Monitor stool output     F/E/N:   · Plan:  unremarkable        Heme/Onc:   · Diagnosis:  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  · EGD and colonoscopy on 12/19/21 unremarkable  Recent Labs     06/01/22  0940 06/01/22  1652 06/02/22  0626   HGB 9 6* 9 2* 8 1*     ? Plan:  CBC check in the morning     Diagnosis:   macrocytosis  B12 folate normal   TSH normal     Endo:   ? Diagnosis:  Unremarkable        ID:   · Diagnosis:  Septic , diarrhea, concern for GI pathology  ? Plan:  Ceftriaxone and metronidazole day 2, likely do 5 days           MSK/Skin:   · Diagnosis:  Generalized weakness  ?  Plan:  PT/OT       Patient appropriate for transfer out of the ICU today?: No  Disposition: Continue Stepdown Level 1 level of care   Code Status: Level 2 - DNAR: but accepts endotracheal intubation  ---------------------------------------------------------------------------------------  ICU CORE MEASURES    Prophylaxis   VTE Pharmacologic Prophylaxis: Apixaban (Eliquis)  VTE Mechanical Prophylaxis: sequential compression device  Stress Ulcer Prophylaxis: Pantoprazole PO    CAM-ICU: Positive    Invasive Devices Review  Invasive Devices  Report    Peripheral Intravenous Line  Duration           Peripheral IV 06/01/22 Right Antecubital <1 day    Peripheral IV 06/01/22 Right;Ventral (anterior) Forearm <1 day          Line  Duration           Hemodialysis AV Fistula 03/26/21 Left Forearm 433 days              Can any invasive devices be discontinued today? Not applicable  ---------------------------------------------------------------------------------------  OBJECTIVE    Vitals   Vitals:    22 0400 22 0500 22 0600 22 0709   BP: 104/54 102/50 (!) 87/50 115/59   BP Location:    Right arm   Pulse: 79 61 55 73   Resp: (!) 27 (!) 23 (!) 31 17   Temp:    97 8 °F (36 6 °C)   TempSrc:    Oral   SpO2: 95% 100% 96% 99%   Weight:       Height:         Temp (24hrs), Av 8 °F (36 6 °C), Min:97 °F (36 1 °C), Max:98 6 °F (37 °C)  Current: Temperature: 97 8 °F (36 6 °C)      Respiratory:  SpO2: SpO2: 99 %  Nasal Cannula O2 Flow Rate (L/min): 2 L/min    Invasive/non-invasive ventilation settings   Respiratory  Report   Lab Data (Last 4 hours)    None         O2/Vent Data (Last 4 hours)    None                Physical Exam  Vitals reviewed  HENT:      Head: Normocephalic  Nose: Nose normal       Mouth/Throat:      Mouth: Mucous membranes are moist    Eyes:      Extraocular Movements: Extraocular movements intact  Conjunctiva/sclera: Conjunctivae normal       Pupils: Pupils are equal, round, and reactive to light  Cardiovascular:      Rate and Rhythm: Regular rhythm  Bradycardia present  Pulses: Normal pulses  Heart sounds: Normal heart sounds  Pulmonary:      Effort: Pulmonary effort is normal       Breath sounds: Normal breath sounds  Abdominal:      General: Bowel sounds are normal       Palpations: Abdomen is soft  Musculoskeletal:      Right lower leg: Edema (Plus 1) present  Left lower leg: Edema (Plus 1) present  Comments: No asterixis    Graft on the left forearm   Skin:     General: Skin is warm  Capillary Refill: Capillary refill takes less than 2 seconds  Neurological:      General: No focal deficit present  Mental Status: He is oriented to person, place, and time  Mental status is at baseline     Psychiatric:         Mood and Affect: Mood normal          Behavior: Behavior normal          Thought Content: Thought content normal          Judgment: Judgment normal              Laboratory and Diagnostics:  Results from last 7 days   Lab Units 06/01/22  1652 06/01/22  0940   WBC Thousand/uL  --  5 49   HEMOGLOBIN g/dL 9 2* 9 6*   HEMATOCRIT %  --  29 8*   PLATELETS Thousands/uL  --  85*   NEUTROS PCT %  --  68   MONOS PCT %  --  14*     Results from last 7 days   Lab Units 06/01/22  1652 06/01/22  0940   SODIUM mmol/L 138 133*   POTASSIUM mmol/L 4 5 6 7*   CHLORIDE mmol/L 101 100   CO2 mmol/L 28 24   ANION GAP mmol/L 9 9   BUN mg/dL 45* 75*   CREATININE mg/dL 7 27* 10 63*   CALCIUM mg/dL 8 6 8 5   GLUCOSE RANDOM mg/dL 74 100   ALT U/L  --  25   AST U/L  --  37   ALK PHOS U/L  --  101   ALBUMIN g/dL  --  3 1*   TOTAL BILIRUBIN mg/dL  --  1 37*     Results from last 7 days   Lab Units 06/01/22  0940   MAGNESIUM mg/dL 2 5   PHOSPHORUS mg/dL 4 3*      Results from last 7 days   Lab Units 06/01/22  0940   INR  1 62*   PTT seconds 45*              ABG:    VBG:  Results from last 7 days   Lab Units 06/01/22  0940   PH HARJEET  7 360   PCO2 HARJEET mm Hg 40 8*   PO2 HARJEET mm Hg 37 0   HCO3 HARJEET mmol/L 22 5*   BASE EXC HARJEET mmol/L -2 7           Micro        EKG: Sinus gwen  Imaging:  I have personally reviewed pertinent reports  Intake and Output  I/O       05/31 0701 06/01 0700 06/01 0701 06/02 0700 06/02 0701  06/03 0700    P  O   480     I V  (mL/kg)  701 9 (8 3)     Other  500     IV Piggyback  150     Total Intake(mL/kg)  1831 9 (21 6)     Other  800     Total Output  800     Net  +1031 9                    Height and Weights   Height: 5' 7" (170 2 cm)  IBW (Ideal Body Weight): 66 1 kg  Body mass index is 29 25 kg/m²    Weight (last 2 days)     Date/Time Weight    06/01/22 1527 84 7 (186 73)            Nutrition       Diet Orders   (From admission, onward)             Start     Ordered    06/01/22 1310  Diet Renal; Potassium 2 GM; No; No  Diet effective now        References:    Nutrtion Support Algorithm Enteral vs  Parenteral   Question Answer Comment   Diet Type Renal    Renal Potassium 2 GM    Should patient have a fluid restriction? No    Should patient have a protein modifier? No    RD to adjust diet per protocol?  Yes        06/01/22 1309                  Active Medications  Scheduled Meds:  Current Facility-Administered Medications   Medication Dose Route Frequency Provider Last Rate    albumin human  12 5 g Intravenous Once Eddie Trevino MD      apixaban  2 5 mg Oral BID Cristela Singh MD      atorvastatin  40 mg Oral Daily Cristela Singh MD      cefTRIAXone  1,000 mg Intravenous Q24H Cristela Singh MD 1,000 mg (06/01/22 1652)    doxercalciferol  3 mcg Intravenous After Dialysis Bethann Hammans, MD      lactulose  20 g Oral BID Eddie Trevino MD      metroNIDAZOLE  500 mg Intravenous Q8H Eddie Trevino  mg (06/02/22 0031)    midodrine  10 mg Oral TID AC Cristela Singh MD      norepinephrine  1-30 mcg/min Intravenous Titrated Cristela Singh MD 6 mcg/min (06/02/22 0661)    pantoprazole  40 mg Oral Early Morning Cristela Singh MD      rifaximin  550 mg Oral Q12H [facility] Eddie Trevino MD      trimethobenzamide  200 mg Intramuscular Q6H PRN Cristela Singh MD       Continuous Infusions:  norepinephrine, 1-30 mcg/min, Last Rate: 6 mcg/min (06/02/22 0611)      PRN Meds:   doxercalciferol, 3 mcg, After Dialysis  trimethobenzamide, 200 mg, Q6H PRN        Allergies   No Known Allergies  ---------------------------------------------------------------------------------------  Advance Directive and Living Will:      Power of :    POLST:    ---------------------------------------------------------------------------------------  Care Time Delivered:   Upon my evaluation, this patient had a high probability of imminent or life-threatening deterioration due to ESRD/VOLUME OVERLOAD/ HYPOTENSION, which required my direct attention, intervention, and personal management  I have personally provided 30 minutes (15 to 30) of critical care time, exclusive of procedures, teaching, family meetings, and any prior time recorded by providers other than myself  Brock Finch MD      Portions of the record may have been created with voice recognition software  Occasional wrong word or "sound a like" substitutions may have occurred due to the inherent limitations of voice recognition software    Read the chart carefully and recognize, using context, where substitutions have occurred

## 2022-06-02 NOTE — QUICK NOTE
Clarified with is the granddaughter that the patient lives with patient was only having 2 BMs a day but was liquid, patient was telling as he has been having frequent bowel movement last night and today however upon discussion with the relative and the nurse, only had 1 bowel movement last night and 2 bowel movements in the morning    Updated family at bedside, and grand daughter over the phone, questions answered, discussed the C diff results and teaching was done at bedside and over the phone

## 2022-06-02 NOTE — PROGRESS NOTES
NEPHROLOGY PROGRESS NOTE   Kris SEBASTIAN [facility] [de-identified] y o  male MRN: [id number]  Unit/Bed#: ICU 10 Encounter: [id number]  Reason for Consult: ESRD    ASSESSMENT/PLAN:  [de-identified] y o  male with ESRD, LOPEZ, a fib, HTN, HPL, IgG lamda, rheumatoid arthritis,  aortic dissection with prior repair, who was admitted to [facility] after presenting with lethargy, diarrhea  A renal consultation is requested today for assistance in the management of ESRD    1  ESRD on HD (MWF @ [address] [place] [address])- HD Friday  - EDW is 81 5kg  - minimal urine output at baseline  2  Access- left AVF  3  Anemia- hgb below goal  - continue epogen 2400 units with HD  - continue venofer 50mg weekly with HD  4  Hypotension, acute on chronic- continue midodrine with HD  - pressors per critical care team  - hopeful wean today  5  Secondary Hyperparathyroidism, renal- continue hectorol with HD  6  Hyperkalemia- improved after medical management and HD yesterday  - secondary to missed dialysis as an outpatient    Disposition:  Next HD Friday    SUBJECTIVE:  Patient feeling well  On levo currently  Denies pain, N/V, LE edema, SOB        OBJECTIVE:  Current Weight: Weight - Scale: 84 4 kg (186 lb)  Vitals:    06/02/22 0800 06/02/22 0830 06/02/22 0900 06/02/22 1000   BP: 98/56 114/58 (!) 109/45 106/51   BP Location:       Pulse: 73 75 57 70   Resp: (!) 30 (!) 27 (!) 27 12   Temp:       TempSrc:       SpO2: 98% 99% 98% 97%   Weight:       Height:           Intake/Output Summary (Last 24 hours) at 6/2/2022 1037  Last data filed at 6/2/2022 0800  Gross per 24 hour   Intake 2120 67 ml   Output 800 ml   Net 1320 67 ml     General: NAD  Skin: no rash  Eyes: anicteric  ENMT: mm moist  Neck: no masses  Respiratory: CTAB  Cardiac: RRR  Extremities: no edema  GI: soft nt nd  Neuro: alert awake  Psych: mood and affect appropriate    Medications:    Current Facility-Administered Medications:     atorvastatin (LIPITOR) tablet 40 mg, 40 mg, Oral, Daily, Chucky Hancock MD, 40 mg at 06/02/22 0800    ceftriaxone (ROCEPHIN) 1 g/50 mL in dextrose IVPB, 1,000 mg, Intravenous, Q24H, Onur Mo MD, Last Rate: 100 mL/hr at 06/01/22 1652, 1,000 mg at 06/01/22 1652    doxercalciferol (HECTOROL) injection 3 mcg, 3 mcg, Intravenous, After Dialysis, Gabby Gotti MD    lactulose oral solution 20 g, 20 g, Oral, TID, Onur Mo MD, 20 g at 06/02/22 0800    metroNIDAZOLE (FLAGYL) IVPB (premix) 500 mg 100 mL, 500 mg, Intravenous, Q8H, Oly Weinstein MD, Last Rate: 200 mL/hr at 06/02/22 0818, 500 mg at 06/02/22 0818    midodrine (PROAMATINE) tablet 10 mg, 10 mg, Oral, TID AC, Onur Mo MD, 10 mg at 06/02/22 0636    norepinephrine (LEVOPHED) 4 mg (STANDARD CONCENTRATION) IV in sodium chloride 0 9% 250 mL, 1-30 mcg/min, Intravenous, Titrated, Onur Mo MD, Last Rate: 15 mL/hr at 06/02/22 0741, 4 mcg/min at 06/02/22 0741    pantoprazole (PROTONIX) EC tablet 40 mg, 40 mg, Oral, Early Morning, Onur Mo MD, 40 mg at 06/02/22 0617    rifaximin (XIFAXAN) tablet 550 mg, 550 mg, Oral, Q12H [facility], Oly Weinstein MD, 550 mg at 06/02/22 0801    trimethobenzamide (TIGAN) IM injection 200 mg, 200 mg, Intramuscular, Q6H PRN, Onur Mo MD    Laboratory Results:  Results from last 7 days   Lab Units 06/02/22  0626 06/01/22  1652 06/01/22  0940   WBC Thousand/uL 5 44  --  5 49   HEMOGLOBIN g/dL 8 1* 9 2* 9 6*   HEMATOCRIT % 26 3*  --  29 8*   PLATELETS Thousands/uL 83*  --  85*   POTASSIUM mmol/L 4 6 4 5 6 7*   CHLORIDE mmol/L 102 101 100   CO2 mmol/L 20* 28 24   BUN mg/dL 53* 45* 75*   CREATININE mg/dL 7 80* 7 27* 10 63*   CALCIUM mg/dL 8 5 8 6 8 5   MAGNESIUM mg/dL 2 2  --  2 5   PHOSPHORUS mg/dL 4 6*  --  4 3*     I have personally reviewed the blood work as stated above and in my note  I have personally reviewed critical care note + renal note

## 2022-06-02 NOTE — UTILIZATION REVIEW
Initial Clinical Review    Admission: Date/Time/Statement:   Admission Orders (From admission, onward)     Ordered        06/01/22 1251  Inpatient Admission  Once                      Orders Placed This Encounter   Procedures    Inpatient Admission     Standing Status:   Standing     Number of Occurrences:   1     Order Specific Question:   Level of Care     Answer:   Critical Care [15]     Order Specific Question:   Estimated length of stay     Answer:   More than 2 Midnights     Order Specific Question:   Certification     Answer:   I certify that inpatient services are medically necessary for this patient for a duration of greater than two midnights  See H&P and MD Progress Notes for additional information about the patient's course of treatment  ED Arrival Information     Expected   -    Arrival   6/1/2022 08:36    Acuity   Urgent            Means of arrival   Ambulance    Escorted by   [facility]    Service   Critical Care/ICU    Admission type   Urgent            Arrival complaint   Weakness           Chief Complaint   Patient presents with    Weakness - Generalized     Patient hx dialysis ( M W F); missed 5 days (Monday - holiday; and today) was on his way today but had diarrhea/BM and went home to change - did not return due to tired/weak; Denies CP/SOB/dizziness; reports dry heaves and "brown water" diarrhea       Initial Presentation: [de-identified] y o  male from home to ED via ems admitted inpatient due to toxic metabolic encephalopathy due to hyperammonemia/enterocolitis/sepsis/ESRD with hyperkalemia/PAF  Presented due to weakness starting about 1 week prior to arrival, on day of arrival + diarrhea, nausea dry heaves  No dialysis since 5/27/22  On exam mucous membranes dry  Bradycardic  Murmur  Lungs rales  Bilateral LE edema  Ammonia 398  Na 133  K 6 7  Bun 75, creatinine 10 63    H&H 9 6/29 8  Platelets 85     Ct abdomen showed hepatic cirrhosis and third spaced, pulmonary edema, pleural effusions, anasarca and ascites  Enteritis/colitis  Renal consulted  Given midodrine and  Hemodialysis started  Given calcium gluconate, dextrose and regular insulin  Plan is hemodialysis as per nephrology  Increase lactulose and start Rifaximin, monitor neuro status  Hold home metoprolol, continue midodrine and wean Levophed as able  Start rocephin and flagyl, check stool cultures  6/1/22 per nephrology - Patient has ESRD on HD and last dialysis 5/27/22  Plan is HD today, midodrine now and 1/2 way through HD if needed  UF if can tolerate  For Hyperkalemia with prolonged QT Give calcium gluconate, insulin/D 50 now  No Kayexalate  Addendum - hypotensive during HD and given albumin, to start vasopressor  Date: 6/2/22    Day 2:  Overnight one BM  Patient alert and oriented  Has weakness  On exam: bradycardic  Bilateral edema  Graft left forearm  Bun 53, creatinine 7 80  H&H 8 1/26  3  Ammonia 81  Plan is increased Lactulose, rifaximin  Hold metoprolol  Resume eliquis  Increase midodrine form 5 to 10 mg TID, continue Levophed for MAP > 64, albumin  Check echo  Consult GI  MRI abdomen     6/2/22 per GI: patient has liver cirrhosis, 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, with hepatic encephalopathy  Mentation improving  To continue Lactulose, continue vancomycin and flagyl  If develops ascites then diagnostic paracentesis       ED Triage Vitals [06/01/22 0846]   Temperature Pulse Respirations Blood Pressure SpO2   98 6 °F (37 °C) (!) 53 18 (!) 95/48 94 %      Temp Source Heart Rate Source Patient Position - Orthostatic VS BP Location FiO2 (%)   Oral Monitor Lying Right arm --      Pain Score       No Pain          Wt Readings from Last 1 Encounters:   06/02/22 84 4 kg (186 lb)     Additional Vital Signs:   06/02/22 0900 -- 57 27 Abnormal  109/45 Abnormal  65 98 % -- -- -- --   06/02/22 0830 -- 75 27 Abnormal  114/58 77 99 % -- -- -- --   06/02/22 0800 -- 73 30 Abnormal  98/56 72 98 % -- -- -- --   06/02/22 0709 97 8 °F (36 6 °C) 73 17 115/59 79 99 % -- -- None (Room air) Lying   06/02/22 0600 -- 55 31 Abnormal  87/50 Abnormal  65 96 % -- -- -- --   06/02/22 0500 -- 61 23 Abnormal  102/50 72 100 % -- -- -- --   06/02/22 0400 -- 79 27 Abnormal  104/54 78 95 % -- -- -- --   06/02/22 0200 -- 53 Abnormal  16 112/59 79 93 % -- -- -- --   06/02/22 0100 -- 53 Abnormal  16 92/53 71 94 % -- -- -- --   06/02/22 0000 97 9 °F (36 6 °C) 49 Abnormal  21 106/53 77 98 % -- -- -- --   06/01/22 2300 -- 53 Abnormal  25 Abnormal  113/55 79 95 % -- -- -- --   Temp: pt refused at 06/01/22 2252   06/01/22 2200 -- 53 Abnormal  15 87/51 Abnormal  65 97 % -- -- -- --   06/01/22 2100 -- 52 Abnormal  13 99/52 72 100 % -- -- -- --   06/01/22 2000 -- 49 Abnormal  10 Abnormal  89/47 Abnormal  64 100 % -- -- -- --   06/01/22 1920 97 6 °F (36 4 °C) 49 Abnormal  11 Abnormal  99/55 70 97 % -- -- Nasal cannula Lying   06/01/22 1815 -- 49 Abnormal  12 88/49 Abnormal  65 96 % -- -- -- --   06/01/22 1700 -- 60 42 Abnormal  106/42 Abnormal  61 96 % -- -- -- --   06/01/22 1600 -- 49 Abnormal  18 84/46 Abnormal  64 100 % -- -- -- --   06/01/22 1527 97 2 °F (36 2 °C) Abnormal  54 Abnormal  19 105/53 75 100 % 28 2 L/min Nasal cannula --   06/01/22 1500 -- 54 Abnormal  16 87/50 Abnormal  65 99 % 28 2 L/min Nasal cannula Lying   06/01/22 1430 97 °F (36 1 °C) Abnormal  50 Abnormal  16 70/45 Abnormal  -- -- -- -- -- Lying   06/01/22 1400 -- 49 Abnormal  16 71/38 Abnormal  -- -- -- -- -- Lying   06/01/22 1300 -- 55 16 73/35 Abnormal  -- -- -- -- -- Lying   06/01/22 1200 98 2 °F (36 8 °C) 59 16 71/41 Abnormal  -- -- -- -- -- Lying   06/01/22 1130 -- 51 Abnormal  17 85/47 Abnormal  63 98 % 28 2 L/min Nasal cannula Lying   06/01/22 1015 -- 50 Abnormal  16 73/35 Abnormal   50 -- -- -- -- --   BP: Patient sleeping  Responds to verbal stimuli  Denies complaints at this time   at 06/01/22 1015       Pertinent Labs/Diagnostic Test Results:   MRI abdomen w wo contrast and mrcp   Final Result by Thang Garcia MD (06/03 1426)      Degraded by respiratory motion  1   No pancreatic head mass  2   Normal biliary tree  3   Cirrhosis with splenomegaly  4   Right pleural effusion  Workstation performed: [id number]         CT abdomen pelvis wo contrast   Final Result by Henry Gaffney MD (06/01 1600)      1  Hepatic cirrhosis and third spacing with partially imaged probable pulmonary edema and small-to-moderate right and trace left pleural effusions, anasarca and small volume ascites  2   Wall thickening of the small bowel and ascending colon may be due to portal enteropathy/colopathy or infectious enteritis/colitis  3   Pancreatic atrophy which is less pronounced at the pancreatic head  No discernible pancreatic head mass is definitively identified on this unenhanced study  The March 31, 2022 contrast enhancement CT abdomen and pelvis report from [facility] in [facility] was reviewed (images were unavailable time of study interpretation)  Said study recommended follow-up MRI of the pancreas for assessment for an underlying pancreatic lesion  Workstation performed: [id number]         XR chest 1 view portable   ED Interpretation by Ethan Lu DO (06/01 1012)   Cardiomegaly with small bilateral pleural effusions      Final Result by Brooke Sargent MD (06/01 1245)   Persistent thoracic aortic dilatation, cardiomegaly      No acute cardiopulmonary disease                    Workstation performed: [id number]           6/1/22 ecg Sinus bradycardia  with 1st degree A-V block  Low voltage QRS  Prolonged QT  Abnormal ECG  When compared with ECG of 12-MAY-2022 09:53,  Ectopic atrial rhythm has replaced Sinus rhythm  T wave inversion no longer evident in Inferior leads  T wave inversion no longer evident in Anterolateral leads  QT has lengthened    6/2/22 echo Left Ventricle: Left ventricular cavity size is normal  Wall thickness is normal  The left ventricular ejection fraction is 55%  Systolic function is normal  Wall motion is normal  Diastolic function is moderately abnormal, consistent with grade II (pseudonormal) relaxation    Right Ventricle: Right ventricular cavity size is mildly dilated  Systolic function is normal     Left Atrium: The atrium is mildly dilated    Right Atrium: The atrium is moderately dilated    Aortic Valve: The aortic valve is trileaflet  The leaflets are moderately thickened  The leaflets are mildly calcified  There is moderately reduced mobility  There is mild regurgitation  There is mild to moderate stenosis  The aortic valve mean gradient is 18 mmHg  The DVI is 0 37  The aortic valve area is 1 41 cm2    Mitral Valve: There is mild regurgitation    Tricuspid Valve: There is moderate to severe regurgitation   The estimated right ventricular systolic pressure is 03 25 mmHg    Results from last 7 days   Lab Units 06/03/22  1016 06/02/22 0626 06/01/22 1652 06/01/22  0940   WBC Thousand/uL 4 60 5 44  --  5 49   HEMOGLOBIN g/dL 8 6* 8 1* 9 2* 9 6*   HEMATOCRIT % 27 0* 26 3*  --  29 8*   PLATELETS Thousands/uL 85* 83*  --  85*   NEUTROS ABS Thousands/µL 2 51 3 46  --  3 69       Results from last 7 days   Lab Units 06/03/22  0549 06/02/22  0626 06/01/22 1652 06/01/22  0940   SODIUM mmol/L 138 136 138 133*   POTASSIUM mmol/L 4 6 4 6 4 5 6 7*   CHLORIDE mmol/L 103 102 101 100   CO2 mmol/L 24 20* 28 24   ANION GAP mmol/L 11 14* 9 9   BUN mg/dL 58* 53* 45* 75*   CREATININE mg/dL 9 17* 7 80* 7 27* 10 63*   EGFR ml/min/1 73sq m 4 5 6 4   CALCIUM mg/dL 8 4 8 5 8 6 8 5   MAGNESIUM mg/dL 2 3 2 2  --  2 5   PHOSPHORUS mg/dL 5 8* 4 6*  --  4 3*     Results from last 7 days   Lab Units 06/03/22  0549 06/02/22 0626 06/01/22 1652 06/01/22  0940   AST U/L 36 33  --  37   ALT U/L 20 19  --  25   ALK PHOS U/L 80 81  --  101   TOTAL PROTEIN g/dL 6 6 6 7  --  7 5   ALBUMIN g/dL 2 8* 2 6* --  3 1*   TOTAL BILIRUBIN mg/dL 0 88 1 84*  --  1 37*   BILIRUBIN DIRECT mg/dL 0 13  --   --   --    AMMONIA umol/L 66 81* 48 398*     Results from last 7 days   Lab Units 06/01/22  1102   POC GLUCOSE mg/dl 100     Results from last 7 days   Lab Units 06/03/22  0549 06/02/22  0626 06/01/22  1652 06/01/22  0940   GLUCOSE RANDOM mg/dL 102 75 74 100     Results from last 7 days   Lab Units 06/01/22  0940   PH HARJEET  7 360   PCO2 HARJEET mm Hg 40 8*   PO2 HARJEET mm Hg 37 0   HCO3 HARJEET mmol/L 22 5*   BASE EXC HARJEET mmol/L -2 7   O2 CONTENT HARJEET ml/dL 9 2   O2 HGB, VENOUS % 66 0     Results from last 7 days   Lab Units 06/01/22  1237 06/01/22  0940   HS TNI 0HR ng/L  --  28   HS TNI 2HR ng/L 26  --    HSTNI D2 ng/L -2  --      Results from last 7 days   Lab Units 06/03/22  0549 06/02/22  0756 06/01/22  0940   PROTIME seconds 21 1* 21 1* 19 1*   INR  1 84* 1 85* 1 62*   PTT seconds  --   --  45*     Results from last 7 days   Lab Units 06/01/22  0940   LIPASE u/L 50       ED Treatment:   Medication Administration from 06/01/2022 0836 to 06/01/2022 1527       Date/Time Order Dose Route Action Comments     06/01/2022 1310 midodrine (PROAMATINE) tablet 5 mg 5 mg Oral Given      06/01/2022 1050 midodrine (PROAMATINE) tablet 5 mg 5 mg Oral Given Given as per MD LARISA Bains     06/01/2022 1243 calcium gluconate 2 g in sodium chloride 0 9% 100 mL (premix) 0 g Intravenous Stopped      06/01/2022 1127 calcium gluconate 2 g in sodium chloride 0 9% 100 mL (premix) 2 g Intravenous New Bag      06/01/2022 1119 dextrose 50 % IV solution 25 mL 25 mL Intravenous Given      06/01/2022 1121 insulin regular (HumuLIN R,NovoLIN R) injection 10 Units 10 Units Intravenous Given      06/01/2022 1346 albumin human (FLEXBUMIN) 25 % injection 25 g 12 5 g Intravenous New Bag      06/01/2022 1440 norepinephrine (LEVOPHED) 4 mg (STANDARD CONCENTRATION) IV in sodium chloride 0 9% 250 mL 1 mcg/min Intravenous New Bag 78/43      pt failed dysphagia,            Past Medical History:   Diagnosis Date    Acute renal failure superimposed on stage 4 chronic kidney disease ([place] [address] ) 10/4/2020    Arthritis     BPH without urinary obstruction     CKD (chronic kidney disease), stage III (HCC)     baseline 1 6-1 7    Dialysis patient ([place] [address] )     GERD (gastroesophageal reflux disease)     Hyperlipidemia     Hypertension     MI (myocardial infarction) ([place] [address] )      Present on Admission:   Acute hepatic encephalopathy   Acute on chronic diastolic (congestive) heart failure (HCC)   Atrial fibrillation with rapid ventricular response (HCC)   Anemia   Septic shock (HCC)   Hepatic cirrhosis (HCC)   Pancreatic mass      Admitting Diagnosis: Diarrhea [R19 7]  Hyperkalemia [E87 5]  Lethargy [R53 83]  Weakness [R53 1]  ESRD (end stage renal disease) ([place] [address] ) [N18 6]  Pancreatic mass [K86 89]  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 ([place] [address] ) [K75 81, K74 60]  Acute hepatic encephalopathy [K72 00]  Age/Sex: [de-identified] y o  male  Admission Orders: 6/1/22 1251 inpatient   Scheduled Medications:  atorvastatin, 40 mg, Oral, Daily  cefTRIAXone, 1,000 mg, Intravenous, Q24H  lactulose, 20 g, Oral, TID  metroNIDAZOLE, 500 mg, Intravenous, Q8H  midodrine, 10 mg, Oral, TID AC  pantoprazole, 40 mg, Oral, Early Morning  rifaximin, 550 mg, Oral, Q12H FILIBERTO    albumin human (FLEXBUMIN) 5 % injection 12 5 g  Dose: 12 5 g  Freq: Once Route: IV  Last Dose: Stopped (06/02/22 6645)  Start: 06/02/22 0730 End: 06/02/22 0811    Continuous IV Infusions:  norepinephrine, 1-30 mcg/min, Intravenous, Titrated      PRN Meds:  doxercalciferol, 3 mcg, Intravenous, After Dialysis  trimethobenzamide, 200 mg, Intramuscular, Q6H PRN    Cardio pulmonary monitoring  Telemetry       IP CONSULT TO GASTROENTEROLOGY    Network Utilization Review Department  ATTENTION: Please call with any questions or concerns to [phone] and carefully listen to the prompts so that you are directed to the right person   All voicemails are confidential   Genet persaud requests for admission clinical reviews, approved or denied determinations and any other requests to dedicated fax number below belonging to the campus where the patient is receiving treatment   List of dedicated fax numbers for the Facilities:  [address] [place] [address] DENIALS (Administrative/Medical Necessity) [phone]   [address]  [address] (Maternity/NICU/Pediatrics) [phone]   [address]  [address] [place] [address] [id number] [address] [place] [address] [facility] [facility] [phone]

## 2022-06-03 PROBLEM — Z22.1 CLOSTRIDIUM DIFFICILE CARRIER: Status: ACTIVE | Noted: 2022-01-01

## 2022-06-03 NOTE — ASSESSMENT & PLAN NOTE
TIGAN for nausea due to prolonged QT  POA: Ammonia 398  Ammonia within normal limits upon discharge  Patient is alert and oriented x3  No asterixis       Plan:     Continue rifaximin and lactulose as prescribed

## 2022-06-03 NOTE — OCCUPATIONAL THERAPY NOTE
Occupational Therapy Evaluation     Patient Name: Jane Cavazos  Today's Date: 6/3/2022  Problem List  Principal Problem:    Acute hepatic encephalopathy  Active Problems:    Acute on chronic diastolic (congestive) heart failure (HCC)    ESRD (end stage renal disease) on dialysis (HCC)    Anemia    Hepatic cirrhosis (HCC)    Atrial fibrillation with rapid ventricular response (HCC)    Septic shock (HCC)    Pancreatic mass    Hyperkalemia    Elevated MCV    Colitis    Clostridium difficile carrier    Past Medical History  Past Medical History:   Diagnosis Date    Acute renal failure superimposed on stage 4 chronic kidney disease ([email or web address] ) 10/4/2020    Arthritis     BPH without urinary obstruction     CKD (chronic kidney disease), stage III (HCC)     baseline 1 6-1 7    Dialysis patient ([facility] )     GERD (gastroesophageal reflux disease)     Hyperlipidemia     Hypertension     MI (myocardial infarction) [facility])      Past Surgical History  Past Surgical History:   Procedure Laterality Date    APPENDECTOMY      CARDIAC SURGERY      COLONOSCOPY  2017    FRACTURE SURGERY      IR AV FISTULA/GRAFT DECLOT  9/3/2021    IR AV FISTULA/GRAFT DECLOT  9/30/2021    IR AV FISTULA/GRAFT DECLOT  1/7/2022    IR PERITONEAL DIALYSIS CATHETER PLACEMENT  2/3/2021    IR PERITONEAL DIALYSIS CATHETER REMOVAL  2/17/2021    IR PERITONEAL DIALYSIS CATHETER REMOVAL AND PLACEMENT NEW SITE  2/9/2021    IR TEMPORARY DIALYSIS CATHETER PLACEMENT  12/23/2019    IR THORACENTESIS  12/24/2019    IR THORACENTESIS  12/27/2019    IR TUNNELED CENTRAL LINE REMOVAL  4/27/2021    IR TUNNELED DIALYSIS CATHETER CHECK/CHANGE/REPOSITION/ANGIOPLASTY  1/6/2020    IR TUNNELED DIALYSIS CATHETER PLACEMENT  1/2/2020    IR TUNNELED DIALYSIS CATHETER PLACEMENT  2/15/2021    IR TUNNELED DIALYSIS CATHETER REMOVAL  3/4/2020    LA ANASTOMOSIS,AV,ANY SITE Left 3/26/2021    Procedure: LEFT FOREARM LOOP AV GRAFT;  Surgeon: Kosta Arreola MD;  [facility];  Service: Vascular AR ASCEND AORTA GRAFT W ROOT REPLACMENT  VALVE CONDUIT/CORON RECONSTRUCT N/A 12/15/2019    Procedure: BENTALL PROCEDURE (ASCENDING AORTIC REPAIR) with 26mm Gelweave Graft;  Surgeon: Adriano France DO;  Location[facility];  Service: Cardiac Surgery    AR RECONSTR TOTAL SHOULDER IMPLANT Right 12/5/2017    Procedure: ARTHROPLASTY SHOULDER REVERSE with OPEN CYST EXCISION;  Surgeon: Reynaldo Tee MD;  [facility];  Service: Orthopedics    SHOULDER SURGERY      WRIST SURGERY             06/03/22 1113   OT Last Visit   OT Visit Date 06/03/22   Note Type   Note type Evaluation   Restrictions/Precautions   Weight Bearing Precautions Per Order No   Other Precautions Multiple lines;Telemetry   Pain Assessment   Pain Assessment Tool 0-10   Pain Score No Pain   Home Living   Type of [facility] One level   Bathroom Shower/Tub Walk-in shower   Bathroom Toilet Standard   Bathroom Equipment Grab bars in shower; Shower chair;Grab bars around toilet   [address] Cane;Walker; Wheelchair-manual   Additional Comments use of SPC PRN   Prior Function   Lives With Spouse  (+ granddaughter)   Receives Help From Family   ADL Assistance Independent   IADLs Independent   Falls in the last 6 months 0   Vocational Retired   Comments (+)drives, granddaughter also drives   Lifestyle   Autonomy PTA pt living with wife and granddaughter in [place], pt (I) with ADLs and IADLs, (-)falls, (+)drives, use of SPC PRN   Reciprocal Relationships supportive family   Service to Others retired   [address] enjoys being outside   Subjective   Subjective "I don't like being inside 4 walls"   ADL   Eating Assistance 7  Independent   Grooming Assistance 7  [address] 6  Modified Independent   [address] 6  [address] [facility] 6  Modified independent   [address] [facility] 6  Modified independent   LB Dressing Deficit Don/doff R sock;Don/doff L sock   Toileting Assistance  6  Modified independent   Bed Mobility   Supine to Sit 6  Modified independent   Transfers   Sit to Stand 6  Modified independent   Stand to Sit 6  Modified independent   Functional Mobility   Functional Mobility 6  Modified independent   Additional Comments functional household distance, increased time, able to manage own lines   Balance   Static Sitting Good   Dynamic Sitting Good   Static Standing Good   Dynamic Standing Fair +   Ambulatory Fair +   Activity Tolerance   Activity Tolerance Patient tolerated treatment well   Medical Staff Made Aware PT SKINNY [place]   RUE Assessment   RUE Assessment WFL   LUE Assessment   LUE Assessment WFL   Hand Function   Gross Motor Coordination Functional   Fine Motor Coordination Functional   Cognition   Overall Cognitive Status WFL   Arousal/Participation Alert; Cooperative   Attention Within functional limits   Orientation Level Oriented X4   Memory Within functional limits   Following Commands Follows one step commands without difficulty   Comments pleasant and cooperative, English is pt's 2nd language, able to communicate appropriately in English   Assessment   Limitation Decreased ADL status; Decreased endurance;Decreased high-level ADLs; Decreased self-care trans   Prognosis Good   Assessment Pt is a [de-identified] y o  male seen for OT evaluation s/p admission to [facility] on 6/1/2022 due to weakness  Pt diagnosed with Acute hepatic encephalopathy  Pt has a significant PMH impacting occupational performance including: CHF, ESRD, hepatic cirrhosis, a fib, septic shock, pancreatic mass, c diff, colitis  Pt with no recent admissions in the last 2 months  Pt with active OT evaluation and treatment orders and activity orders for Activity as tolerated  PTA pt living with wife and granddaughter in [facility], pt (I) with ADLs and IADLs, (-)falls, (+)drives, use of SPC PRN  Pt is motivated to return to home   Personal and environmental factors supporting pt at time of IE include social support, attitude towards recovery and accessible home environment  Personal and environmental factors inhibiting engagement in occupations include advanced age and difficulty completing IADLs  During evaluation pt performed as is outlined above in flowsheet  Pt required VC for safety  Standardized assessments used to assist in identifying performance deficits include AMPA 6-Clicks  Performance deficits that affect the pts occupational performance during the initial evaluation include impaired balance, endurance, activity tolerance and overall strength  Pt is close to his functional baseline  No further acute OT needs identified at this time  Recommend continued active ADL participation and mobilization with hospital staff while in the hospital to increase pts endurance and strength upon D/C  From OT standpoint, recommend D/C to home with family support when medically cleared  D/C pt from OT caseload at this time  Goals   Patient Goals to go home soon   Plan   OT Frequency Eval only   Recommendation   OT Discharge Recommendation No rehabilitation needs   AM-PAC Daily Activity Inpatient   Lower Body Dressing 4   Bathing 4   Toileting 4   Upper Body Dressing 4   Grooming 4   Eating 4   Daily Activity Raw Score 24   Daily Activity Standardized Score (Calc for Raw Score >=11) 57 54   AM-[facility] Applied Cognition Inpatient   Following a Speech/Presentation 4   Understanding Ordinary Conversation 4   Taking Medications 4   Remembering Where Things Are Placed or Put Away 4   Remembering List of 4-5 Errands 4   Taking Care of Complicated Tasks 4   Applied Cognition Raw Score 24   Applied Cognition Standardized Score 62 21     The patient's raw score on the AM-PAC Daily Activity inpatient short form is 24, standardized score is 57 54, greater than 39 4  Patients at this level are likely to benefit from discharge to home   Please refer to the recommendation of the Occupational Therapist for safe discharge planning  This session, pt required and most appropriately benefited from skilled OT/PT co-eval due to significant regression from functional baseline and decreased activity tolerance  OT and PT goals were addressed separately as seen in documentation       At the end of the session, all needs met and pt seated in bedside chair, LEs elevated , and call bell within reach    [facility], OTR/L

## 2022-06-03 NOTE — ASSESSMENT & PLAN NOTE
Diagnosis:  Paroxysmal Atrial fibrillation/sinus bradycardia     Plan:  Heart rate on the low 50s, with low blood pressure, resume Eliquis, metoprolol decreased to 12 5 mg b i d  Patient may hold metoprolol if heart rates are less than 60 beats per minute     Patient to follow-up with cardiology in 1 week

## 2022-06-03 NOTE — ASSESSMENT & PLAN NOTE
Plan:    Close follow-up with GI in outpatient setting upon discharge  Continue rifaximin and lactulose  ·

## 2022-06-03 NOTE — PLAN OF CARE
Target uf goal 2 5L as tolerated  Patient orered HD for 4 hours and requested to only do 3Hours on 2K bath for serum K of 4 6 per protocol  Post-Dialysis RN Treatment Note    Blood Pressure:  Pre 100/48 mm/Hg  Post 97/56 mmHg   EDW  81 5 kg    Weight:  Pre 87 6 kg   Post 86 9 kg   Mode of weight measurement: Bed Scale   Volume Removed  1698 ml    Treatment duration 2 hours 10 minutes    NS given  No    Treatment shortened? Yes, describe: Patient infiltrated venous needle, due to pt movement  unable to recannualate above, reinfused blood via arterial needle      Medications given during Rx Venofer 50mg   Estimated Kt/V  Not Applicable   Access type: AV graft   Access Issues: No    Report called to primary nurse   Yes, Anna Umana, RN          Problem: METABOLIC, FLUID AND ELECTROLYTES - ADULT  Goal: Electrolytes maintained within normal limits  Description: INTERVENTIONS:  - Monitor labs and assess patient for signs and symptoms of electrolyte imbalances  - Administer electrolyte replacement as ordered  - Monitor response to electrolyte replacements, including repeat lab results as appropriate  - Instruct patient on fluid and nutrition as appropriate  Outcome: Progressing  Goal: Fluid balance maintained  Description: INTERVENTIONS:  - Monitor labs   - Monitor I/O and WT  - Instruct patient on fluid and nutrition as appropriate  - Assess for signs & symptoms of volume excess or deficit  Outcome: Progressing

## 2022-06-03 NOTE — PROGRESS NOTES
Daily Progress Note - [address] [de-identified] y o  male MRN: [id number]  Unit/Bed#: ICU 10 Encounter: [id number]        ----------------------------------------------------------------------------------------  HPI/24hr events:  Patient had 8 bowel movements yesterday, patient alert and oriented x3 the whole night no subjective complaints feeling better  Was taken off Levophed drip at 2:00 a m  Today     ---------------------------------------------------------------------------------------  SUBJECTIVE  Patient has no subjective complain aside from the diarrhea    Review of Systems   Gastrointestinal: Positive for diarrhea  All other systems reviewed and are negative  Review of systems was reviewed and negative unless stated above in HPI/24-hour events   ---------------------------------------------------------------------------------------  Assessment and Plan:  Neuro:   · Diagnosis:  Hepatic encephalopathy (resolved)  Ammonia 398  ? Plan:   change lactulose to 20 mg daily titrate bowel movements to goal of 3 BM, will be discharged with this medication  ? Continue Rifaximin 550 b i d           CV:   · Diagnosis:  Paroxysmal Atrial fibrillation/sinus bradycardia  ? Plan:  Heart rate on the low 50s, with low blood pressure, hold metoprolol, will likely resume Eliquis today    · Diagnosis:  Septic shock  could be infection/volume depletion/3rd spacing  ? Plan:   continue Midodrine 10 mg t i d , norepi p r n  to keep map more than 65  · Diagnosis:  Diastolic heart failure  93/40/1704 EF of 67% grade 2 diastolic dysfunction mild stenosis of the aorta  ? Plan:  Repeat  Echo:  EF of 55% with grade 2 diastolic dysfunction with mild to moderate stenosis of the aortic valve, with moderate to severe regurgitation of the tricuspid valve      Pulm:  · Diagnosis:  Unremarkable  ? Plan:   currently on room air        GI:   · Diagnosis:  Hepatics cirrhosis  ? Plan:  PT/INR, liver panel, ammonia Check, lactulose  ?  GI following  · Diagnosis:  C diff colonization/colitis  ? Plan:  Stool enteric panel still in progress, C diff  showing colonization, TIGAN for nausea due to prolonged QT  ? Patient is placed on C diff prophylaxis since on ceftriaxone and metronidazole  · Diagnosis:  Pancreatic mass   ? Plan:   GI consult/MRI abdomen today at noon     :   · Diagnosis:  Decreased urine output  ? Plan:  Strict I&O and daily weight, UA  · Diagnosis:  Will monitor his stools or any bleeding, per daughter no reports of bleeding that was previous admission  ? Plan:  Monitor stool output     F/E/N:   · Plan:  unremarkable        Heme/Onc:   · Diagnosis:  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  · EGD and colonoscopy on 12/19/21 unremarkable  Recent Labs     06/01/22  0940 06/01/22  1652 06/02/22  0626   HGB 9 6* 9 2* 8 1*       ? Plan:  CBC clotted will check again      Diagnosis:   macrocytosis  B12 folate normal   TSH normal     Endo:   ? Diagnosis:  Unremarkable        ID:   · Diagnosis:  Septic , diarrhea, concern for GI pathology showing C diff colonization lab  ? Plan:  Ceftriaxone and metronidazole day 2, likely do 5 days  ? Vancomycin p o  125 mg b i d  As prophylaxis will continue 3 days after last antibiotic  ? Will discussed with attending if will treat as a real C diff infection or colonization           MSK/Skin:   · Diagnosis:  Generalized weakness  ?  Plan:  PT/OT    o     Patient appropriate for transfer out of the ICU today?: No  Disposition: Transfer to Med Surg with Telemetry in afternoon  Code Status: Level 2 - DNAR: but accepts endotracheal intubation  ---------------------------------------------------------------------------------------  ICU CORE MEASURES    Prophylaxis   VTE Pharmacologic Prophylaxis: Apixaban (Eliquis)  VTE Mechanical Prophylaxis: sequential compression device  Stress Ulcer Prophylaxis: Omeprazole PO      CAM-ICU: Negative    Invasive Devices Review  Invasive Devices  Report    Peripheral Intravenous Line  Duration Peripheral IV 22 Right;Ventral (anterior) Forearm 1 day    Peripheral IV 22 Distal;Right;Upper;Ventral (anterior) Arm <1 day          Line  Duration           Hemodialysis AV Fistula 21 Left Forearm 434 days              Can any invasive devices be discontinued today? No  ---------------------------------------------------------------------------------------  OBJECTIVE    Vitals   Vitals:    22 0430 22 0500 22 0700 22 0743   BP: 92/53 (!) 87/50 96/55    Pulse: 66 55 58    Resp: (!) 33 16 19    Temp:    97 8 °F (36 6 °C)   TempSrc:    Oral   SpO2: 94% 93% 97%    Weight:       Height:         Temp (24hrs), Av 8 °F (36 6 °C), Min:97 6 °F (36 4 °C), Max:97 9 °F (36 6 °C)  Current: Temperature: 97 8 °F (36 6 °C)      Respiratory:  SpO2: SpO2: 97 %  Nasal Cannula O2 Flow Rate (L/min): 2 L/min    Invasive/non-invasive ventilation settings   Respiratory  Report   Lab Data (Last 4 hours)    None         O2/Vent Data (Last 4 hours)    None                Physical Exam  Vitals reviewed  HENT:      Head: Normocephalic  Nose: Nose normal       Mouth/Throat:      Mouth: Mucous membranes are moist    Eyes:      Extraocular Movements: Extraocular movements intact  Conjunctiva/sclera: Conjunctivae normal       Pupils: Pupils are equal, round, and reactive to light  Cardiovascular:      Rate and Rhythm: Regular rhythm  Bradycardia present  Pulses: Normal pulses  Heart sounds: Normal heart sounds  Pulmonary:      Effort: Pulmonary effort is normal       Breath sounds: Normal breath sounds  Abdominal:      General: Bowel sounds are normal       Palpations: Abdomen is soft  Musculoskeletal:      Right lower leg: Edema (Plus 2) present  Left lower leg: Edema (Plus 2) present  Comments: No asterixis    Graft on the left forearm   Skin:     General: Skin is warm  Capillary Refill: Capillary refill takes less than 2 seconds     Neurological: General: No focal deficit present  Mental Status: He is oriented to person, place, and time  Mental status is at baseline  Psychiatric:         Mood and Affect: Mood normal          Behavior: Behavior normal          Thought Content: Thought content normal          Judgment: Judgment normal              Laboratory and Diagnostics:  Results from last 7 days   Lab Units 06/02/22  0626 06/01/22  1652 06/01/22  0940   WBC Thousand/uL 5 44  --  5 49   HEMOGLOBIN g/dL 8 1* 9 2* 9 6*   HEMATOCRIT % 26 3*  --  29 8*   PLATELETS Thousands/uL 83*  --  85*   NEUTROS PCT % 63  --  68   MONOS PCT % 18*  --  14*     Results from last 7 days   Lab Units 06/03/22  0549 06/02/22  0626 06/01/22  1652 06/01/22  0940   SODIUM mmol/L 138 136 138 133*   POTASSIUM mmol/L 4 6 4 6 4 5 6 7*   CHLORIDE mmol/L 103 102 101 100   CO2 mmol/L 24 20* 28 24   ANION GAP mmol/L 11 14* 9 9   BUN mg/dL 58* 53* 45* 75*   CREATININE mg/dL 9 17* 7 80* 7 27* 10 63*   CALCIUM mg/dL 8 4 8 5 8 6 8 5   GLUCOSE RANDOM mg/dL 102 75 74 100   ALT U/L  --  19  --  25   AST U/L  --  33  --  37   ALK PHOS U/L  --  81  --  101   ALBUMIN g/dL  --  2 6*  --  3 1*   TOTAL BILIRUBIN mg/dL  --  1 84*  --  1 37*     Results from last 7 days   Lab Units 06/03/22  0549 06/02/22  0626 06/01/22  0940   MAGNESIUM mg/dL 2 3 2 2 2 5   PHOSPHORUS mg/dL 5 8* 4 6* 4 3*      Results from last 7 days   Lab Units 06/03/22  0549 06/02/22  0756 06/01/22  0940   INR  1 84* 1 85* 1 62*   PTT seconds  --   --  45*              ABG:    VBG:  Results from last 7 days   Lab Units 06/01/22  0940   PH HARJEET  7 360   PCO2 HARJEET mm Hg 40 8*   PO2 HARJEET mm Hg 37 0   HCO3 HARJEET mmol/L 22 5*   BASE EXC HARJEET mmol/L -2 7           Micro  Results from last 7 days   Lab Units 06/02/22  0441 06/01/22  1546   MRSA CULTURE ONLY   --  No Methicillin Resistant Staphlyococcus aureus (MRSA) isolated   C DIFF TOXIN B BY PCR  Positive*  --        Imaging:  I have personally reviewed pertinent reports  Intake and Output  I/O       06/01 0701  06/02 0700 06/02 0701  06/03 0700 06/03 0701  06/04 0700    P  O  480 540     I V  (mL/kg) 701 9 (8 3) 160 7 (1 9)     Other 500      IV Piggyback 150 700     Total Intake(mL/kg) 1831 9 (21 6) 1400 7 (16 6)     Other 800      Total Output 800      Net +1031 9 +1400 7            Unmeasured Stool Occurrence  8 x             Height and Weights   Height: 5' 7" (170 2 cm)  IBW (Ideal Body Weight): 66 1 kg  Body mass index is 29 13 kg/m²  Weight (last 2 days)     Date/Time Weight    06/02/22 0709 84 4 (186)    06/01/22 1527 84 7 (186 73)            Nutrition       Diet Orders   (From admission, onward)             Start     Ordered    06/01/22 1310  Diet Renal; Potassium 2 GM; No; No  Diet effective now        References:    Nutrtion Support Algorithm Enteral vs  Parenteral   Question Answer Comment   Diet Type Renal    Renal Potassium 2 GM    Should patient have a fluid restriction? No    Should patient have a protein modifier? No    RD to adjust diet per protocol?  Yes        06/01/22 1309                    Active Medications  Scheduled Meds:  Current Facility-Administered Medications   Medication Dose Route Frequency Provider Last Rate    atorvastatin  40 mg Oral Daily Kathy Ferrera MD      cefTRIAXone  1,000 mg Intravenous Q24H Kathy Ferrera MD 1,000 mg (06/02/22 1356)    doxercalciferol  3 mcg Intravenous After Dialysis Mikaela Delatorre MD      epoetin uday  2,400 Units Intravenous After Dialysis [facility]      iron sucrose  50 mg Intravenous Weekly [place], [place]      lactulose  20 g Oral Daily Kathy Ferrera MD      metroNIDAZOLE  500 mg Intravenous Kristel Dara Mckinnon  mg (06/03/22 0102)    midodrine  10 mg Oral TID AC Kathy Ferrera MD      norepinephrine  1-30 mcg/min Intravenous Titrated Kathy Ferrera MD Stopped (06/03/22 0210)    pantoprazole  40 mg Oral Early Morning Lazaro Horton Mag Nichols MD      rifaximin  550 mg Oral Q12H [facility] Yanely Ellison MD      trimethobenzamide  200 mg Intramuscular Q6H PRN Serena Lance MD      vancomycin  125 mg Oral Q12H [facility] Serena Lance MD       Continuous Infusions:  norepinephrine, 1-30 mcg/min, Last Rate: Stopped (06/03/22 0210)      PRN Meds:   doxercalciferol, 3 mcg, After Dialysis  epoetin uday, 2,400 Units, After Dialysis  trimethobenzamide, 200 mg, Q6H PRN        Allergies   No Known Allergies  ---------------------------------------------------------------------------------------  Advance Directive and Living Will:      Power of :    POLST:    ---------------------------------------------------------------------------------------  Care Time Delivered:   No Critical Care time spent     Serena Lance MD      Portions of the record may have been created with voice recognition software  Occasional wrong word or "sound a like" substitutions may have occurred due to the inherent limitations of voice recognition software    Read the chart carefully and recognize, using context, where substitutions have occurred

## 2022-06-03 NOTE — ASSESSMENT & PLAN NOTE
Diagnosis:  Hepatic encephalopathy (resolved)  Ammonia 398  º Plan:   change lactulose to 20 mg daily titrate bowel movements to goal of 3 BM, will be discharged with this medication  º Continue Rifaximin 550 b i d

## 2022-06-03 NOTE — PROGRESS NOTES
[address] [facility] [de-identified] y o  male MRN: [id number]  Unit/Bed#: ICU 10 Encounter: [id number]  Reason for Consult:     ASSESSMENT and PLAN:  73-year-old male with history of ESRD, atrial fibrillatio, hypertension, HLP, RA, LOPEZ, aortic dissection with prior repair, MGUS who was admitted with lethargy and diarrhea  Nephrology following for management of ESRD/hemodialysis    ESRD on hemodialysis (MWF at [facility])  · Target weight 81 5 kg  · No indication to challenge  · Plan:  · Dialysis this afternoon    Access:  Left arm AV fistula with good bruit and thrill    Anemia in CKD:  · Continue URBANO 2400 units with each hemodialysis treatment and Venofer 50 mg weekly  · Current hemoglobin 8 6, below goal   Continue therapy    Blood pressure/volume status:  · Chronic hypotension, continue midodrine  · Pressure acceptable, asymptomatic  · Did echocardiogram ejection fraction 55% with grade 2 diastolic dysfunction, mild to moderate aortic stenosis, moderate to severe tricuspid regurg    CKD MBD/secondary hyperparathyroidism of renal origin  · Continue Hectorol    Electrolytes:  · Potassium acceptable    Cirrhosis/LOPEZ:  · Encephalopathy improved  · Diarrhea, colitis, C diff colonization  · On lactulose    Pancreatic mass:  · MRI today    DISPOSITION:  Hemodialysis this afternoon    SUBJECTIVE / 24H INTERVAL HISTORY:  No complaints  He is concerned about "his liver"- having an MRI due to pancreatic mass       OBJECTIVE:  Current Weight: Weight - Scale: 84 4 kg (186 lb)  Vitals:    06/03/22 0743 06/03/22 0800 06/03/22 0900 06/03/22 1000   BP:  104/65 92/55 97/52   Pulse:  55 72 57   Resp:  (!) 31 (!) 26 17   Temp: 97 8 °F (36 6 °C)   97 9 °F (36 6 °C)   TempSrc: Oral   Oral   SpO2:  96% 95% 97%   Weight:       Height:           Intake/Output Summary (Last 24 hours) at 6/3/2022 1104  Last data filed at 6/3/2022 1000  Gross per 24 hour   Intake 1621 94 ml   Output --   Net 1621 94 ml     General: NAD, sitting comfortably on the edge of bed  Skin: no rash, warm and dry  Eyes: anicteric sclera  ENT: moist mucous membrane  Neck: supple, no JVD noted  Chest: CTA b/l, no ronchii, no wheeze, no rubs, no rales, normal effort  CVS: s1s2, no murmur, no gallop, no rub, slightly irregular  Abdomen: soft, nontender, nl sounds, non distended  Extremities: no edema LE b/l  : no shine  Neuro: AAOX3  Psych: normal affect  Medications:    Current Facility-Administered Medications:     atorvastatin (LIPITOR) tablet 40 mg, 40 mg, Oral, Daily, Jeffry Shea MD, 40 mg at 06/03/22 0951    ceftriaxone (ROCEPHIN) 1 g/50 mL in dextrose IVPB, 1,000 mg, Intravenous, Q24H, Jeffry Shea MD, Last Rate: 100 mL/hr at 06/02/22 1356, 1,000 mg at 06/02/22 1356    doxercalciferol (HECTOROL) injection 3 mcg, 3 mcg, Intravenous, After Dialysis, Kiley Batista MD    epoetin uday (EPOGEN,PROCRIT) injection 2,400 Units, 2,400 Units, Intravenous, After Dialysis, [facility]    iron sucrose (VENOFER) 50 mg in sodium chloride 0 9 % 100 mL IVPB, 50 mg, Intravenous, Weekly, [facility], Brandyn Butler, Held at 06/03/22 0932    lactulose oral solution 20 g, 20 g, Oral, Daily, Jeffry Shea MD, 20 g at 06/03/22 0951    metroNIDAZOLE (FLAGYL) IVPB (premix) 500 mg 100 mL, 500 mg, Intravenous, Q8H, Khadijah Moody MD, Last Rate: 200 mL/hr at 06/03/22 0942, 500 mg at 06/03/22 0942    midodrine (PROAMATINE) tablet 10 mg, 10 mg, Oral, TID AC, Jeffry Shea MD, 10 mg at 06/03/22 0538    pantoprazole (PROTONIX) EC tablet 40 mg, 40 mg, Oral, Early Morning, Jeffry Shea MD, 40 mg at 06/03/22 0538    rifaximin (XIFAXAN) tablet 550 mg, 550 mg, Oral, Q12H [address], Khadijah Moody MD, 550 mg at 06/03/22 6059    trimethobenzamide (TIGAN) IM injection 200 mg, 200 mg, Intramuscular, Q6H PRN, Jeffry Shea MD    vancomycin [facility]) oral solution 125 mg, 125 mg, Oral, Q12H [address], Titus Hewitt Diego Villatoro MD, 125 mg at 06/03/22 0951    Laboratory Results:  Results from last 7 days   Lab Units 06/03/22  1016 06/03/22  0549 06/02/22  0626 06/01/22  1652 06/01/22  0940   WBC Thousand/uL 4 60  --  5 44  --  5 49   HEMOGLOBIN g/dL 8 6*  --  8 1* 9 2* 9 6*   HEMATOCRIT % 27 0*  --  26 3*  --  29 8*   PLATELETS Thousands/uL 85*  --  83*  --  85*   POTASSIUM mmol/L  --  4 6 4 6 4 5 6 7*   CHLORIDE mmol/L  --  103 102 101 100   CO2 mmol/L  --  24 20* 28 24   BUN mg/dL  --  58* 53* 45* 75*   CREATININE mg/dL  --  9 17* 7 80* 7 27* 10 63*   CALCIUM mg/dL  --  8 4 8 5 8 6 8 5   MAGNESIUM mg/dL  --  2 3 2 2  --  2 5   PHOSPHORUS mg/dL  --  5 8* 4 6*  --  4 3*

## 2022-06-03 NOTE — ASSESSMENT & PLAN NOTE
Diagnosis:  Paroxysmal Atrial fibrillation/sinus bradycardia  º Plan:  Heart rate on the low 50s, with low blood pressure, hold metoprolol, will likely resume Eliquis today

## 2022-06-03 NOTE — ASSESSMENT & PLAN NOTE
Wt Readings from Last 3 Encounters:   06/02/22 84 4 kg (186 lb)   06/01/22 84 7 kg (186 lb 11 7 oz)   05/24/22 83 9 kg (185 lb)       10/06/2020 EF of 35% grade 2 diastolic dysfunction mild stenosis of the aorta  º Plan:  Repeat  Echo:  EF of 55% with grade 2 diastolic dysfunction with mild to moderate stenosis of the aortic valve, with moderate to severe regurgitation of the tricuspid valve

## 2022-06-03 NOTE — UTILIZATION REVIEW
Inpatient Admission Authorization Request   NOTIFICATION OF INPATIENT ADMISSION/INPATIENT AUTHORIZATION REQUEST   SERVICING FACILITY:   [address]  Tax ID: [id number]  NPI: [id number]  Place of Service: Inpatient [address] [facility]  [address]  Place of Service Code: 24     ATTENDING PROVIDER:  Attending Name and NPI#: Ligia Rodarte [[id number]]  Address: [address]  Phone: [phone]     UTILIZATION REVIEW CONTACT:  Caesar Darby Utilization   Network Utilization Review Department  Phone: [phone]  Fax: [phone]  Email: [email or web address]     PHYSICIAN ADVISORY SERVICES:  FOR ITDX-JI-OBUM REVIEW - MEDICAL NECESSITY DENIAL  Phone: [phone]  Fax: [phone]  Email: [email or web address]     TYPE OF REQUEST:  Inpatient Status     ADMISSION INFORMATION:  ADMISSION DATE/TIME: 6/1/22 12:51 PM  PATIENT DIAGNOSIS CODE/DESCRIPTION:  Diarrhea [R19 7]  Hyperkalemia [E87 5]  Lethargy [R53 83]  Weakness [R53 1]  ESRD (end stage renal disease) ([facility] [address] ) [N18 6]  Pancreatic mass [K86 89]  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

## 2022-06-03 NOTE — ASSESSMENT & PLAN NOTE
Lab Results   Component Value Date    EGFR 5 06/04/2022    EGFR 4 06/03/2022    EGFR 5 06/02/2022    CREATININE 7 87 (H) 06/04/2022    CREATININE 9 17 (H) 06/03/2022    CREATININE 7 80 (H) 06/02/2022     Continue MWF dialysis

## 2022-06-03 NOTE — ASSESSMENT & PLAN NOTE
Diagnosis:  C diff colonization/colitis    Plan:  Stool enteric panel still in progress, C diff  showing colonization,  Prophylactic vancomycin PO medication while on antibiotics and continue 3 days after antibiotics

## 2022-06-03 NOTE — PHYSICAL THERAPY NOTE
PHYSICAL THERAPY EVALUATION NOTE    Patient Name: Rita Sharpe  Today's Date: 6/3/2022    AGE:   [de-identified] y o    Mrn:   [id number]  ADMIT DX:  Diarrhea [R19 7]  Hyperkalemia [E87 5]  Lethargy [R53 83]  Weakness [R53 1]  ESRD (end stage renal disease) ([place] [address] ) [N18 6]  Pancreatic mass [K86 89]  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 ([place] [address] ) [K75 81, K74 60]  Acute hepatic encephalopathy [K72 00]    Past Medical History:   Diagnosis Date    Acute renal failure superimposed on stage 4 chronic kidney disease ([place] [address] ) 10/4/2020    Arthritis     BPH without urinary obstruction     CKD (chronic kidney disease), stage III (HCC)     baseline 1 6-1 7    Dialysis patient ([place] [address] )     GERD (gastroesophageal reflux disease)     Hyperlipidemia     Hypertension     MI (myocardial infarction) ([place] [address] )      Length Of Stay: 2  PHYSICAL THERAPY EVALUATION :   Patient's identity confirmed via 2 patient identifiers (full name and ) at start of session       22 1114   Note Type   Note type Evaluation   Pain Assessment   Pain Assessment Tool 0-10   Pain Score No Pain   Restrictions/Precautions   Weight Bearing Precautions Per Order No   Other Precautions Multiple lines;Telemetry   Home Living   Type of [facility] One level  (0 CHRISTOPHER)   Bathroom Shower/Tub Walk-in shower   Home Equipment Walker;Cane;Wheelchair-manual   Additional Comments Pt reports using SPC PRN   Prior Function   Level of [place] Independent with ADLs and functional mobility   Lives With Spouse  (+ granddaughter)   Receives Help From Family   ADL Assistance Independent   IADLs Independent   Falls in the last 6 months 0   Vocational Retired   General   Family/Caregiver Present No   Cognition   Overall Cognitive Status WFL   Arousal/Participation Alert   Orientation Level Oriented X4   Following Commands Follows multistep commands with increased time or repetition   Comments Pt very pleasant and agreeable to paricipate in PT intervention   Bed Mobility   Supine to Sit 6  Modified independent   Additional items Assist x 1;HOB elevated; Increased time required   Transfers   Sit to Stand 6  Modified independent   Additional items Assist x 1; Increased time required   Stand to Sit 6  Modified independent   Additional items Assist x 1; Increased time required   Ambulation/Elevation   Gait pattern Decreased foot clearance; Short stride   Gait Assistance 5  Supervision   Additional items Assist x 1   Assistive Device None   Distance 100 ft   Balance   Static Sitting Good   Static Standing Good   Ambulatory Fair +   Activity Tolerance   Activity Tolerance Patient tolerated treatment well   Medical Staff Made Aware Care coordination w/ OT Hailee   Nurse Made Aware Spoke to RN [place]   Assessment   Prognosis Fair   Problem List Decreased endurance; Impaired balance;Decreased mobility   Assessment Oliver Sharpe is a [de-identified] y o  Male who [facility] on 6/1/2022 from home w/ c/o generalized weakness and missed HD appt and diagnosis of acute hepatic encephalopathy  Orders for PT eval and treat received  Pt presents w/ comorbidities of CHF, ESRD on HD, Afib, hepatic cirrhosis, HTN  At baseline, pt mobilizes independently w/ SPC PRN, and reports 0 falls in the last 6 months  Upon evaluation, pt presents w/ the following deficits: weakness, impaired balance and decreased endurance  Pt requires mod I for bed mobility, mod I for transfers, and S for gait  At this time, pt is able to navigate around hospital environment without assistance, and report no concern w/ navigating home environment upon D/C  No further acute PT needs at this time, will D/C from PT caseload as pt is ambulating at or very near his baseline  Discharge recommendation is for Home w/ family support, recommend that pt continues to mobilize w/ RN staff during admission to decrease risk for hospital-acquired deconditioning     Goals   Patient Goals to go home soon   Recommendation   PT Discharge Recommendation No rehabilitation needs   AM-PAC Basic Mobility Inpatient   Turning in Bed Without Bedrails 4   Lying on Back to Sitting on Edge of Flat Bed 4   Moving Bed to Chair 4   Standing Up From Chair 4   Walk in Room 3   Climb 3-5 Stairs 3   Basic Mobility Inpatient Raw Score 22   Basic Mobility Standardized Score 47 4   Highest Level Of Mobility   JH-HLM Goal 7: Walk 25 feet or more   JH-HLM Achieved 7: Walk 25 feet or more   Additional Treatment Session   Start Time 1112   End Time 1130   Treatment Assessment Pt seated OOB in recliner chair  Agreeable to trial RW for mobility  Pt is able to ambulate w/ RW whlie managing lines w/ mod I, and then only ambulating w/ 1 UE on RW  Discontinued use of RW, and pt was able to ambulate another 100 ft w/ no AD improving to mod I this time with increased reps and distance  Pt then stood at foot of bed and performed 5 squats w/ BUE support w/ supervision  Pt seated in recliner chair at end of session   Equipment Use Attempted RW then D/C'd due to not needing   Additional Treatment Day 1   End of Consult   Patient Position at End of Consult Bedside chair;Bed/Chair alarm activated; All needs within reach     The patient's AM-PAC Basic Mobility Inpatient Short Form Raw Score is 22, Standardized Score is 47 4  A standardized score greater than 38 32 (raw score of 16) suggests the patient may benefit from discharge to home which may not coincide with above PT recommendations  However please refer to therapist recommendation for discharge planning given other factors that may influence destination  Given the above findings from this evaluation, at this time this patient does not require skilled inpatient PT for the remainder of this admission  Will D/C patient from PT caseload, please reconsult if any changes or needs arise        Arely Nath, PT

## 2022-06-03 NOTE — PROGRESS NOTES
Progress Note Jo-Ann Sharpe [de-identified] y o  male MRN: [id number]    Unit/Bed#: ICU 10 Encounter: [id number]    Assessment and Plan:     1  New diagnosis of cirrhosis, unclear etiology complicated by hepatic encephalopathy MELD 27, driven by Cr  Patient doing well today without any GI complaints  No asterixis on exam   No longer on pressors  Vital signs stable with blood pressure slightly on the lower side at 97/46     -extensive serological workup for etiology of cirrhosis pending     -small volume ascites noted on previous ultrasound and CT scan  Abdomen nontender, nondistended today  Continue to monitor     -patient on lactulose  Eight bowel movements reported yesterday  One so far today   -patient also being treated for C diff  Please adjust lactulose to avoid over 4 bowel movements daily  -no asterixis on exam  -continue to monitor mental status    -appreciate recommendations from Nephrology due to end-stage renal disease  Patient will be getting dialysis tomorrow  Placed on midodrine 10 mg t i d     -AFP pending  No suspicious liver lesions noted on previous imaging  Will be going for MRI today     -recent EGD without varices  Continue monitoring in the outpatient setting     -monitor MELD labs  -outpatient follow-up    2  Diarrhea  Stool enteric normal   C dif with PCR positive, toxin is negative  Due to diarrhea we are treating with vancomycin now  Benign abdomen today  No white count     -patient reports bowel movement frequency is improving   -continue vancomycin   -she is also on lactulose which can likely be held or decreased to avoid more frequent bowel movements   -abdomen is benign  Continue to monitor    3  Pancreatic lesion  Previously seen on CT scan at prior facility  -MRI with MRCP has been ordered    Okay per Nephrology team in the setting of CKD     ----------------------------------------------------------------------------------------------------------------    Subjective: Patient reports he is doing well  He reports his lower extremity swelling is improved  He denies any abdominal pain, nausea or vomiting  He reports having 3 bowel movements last night which is improved from prior  He denies any blood or melena  He was about to eat prior to my arrival    Objective:     Vitals: Blood pressure (!) 97/46, pulse (!) 54, temperature 97 5 °F (36 4 °C), temperature source Oral, resp  rate 15, height 5' 7" (1 702 m), weight 84 4 kg (186 lb), SpO2 95 %  ,Body mass index is 29 13 kg/m²  Intake/Output Summary (Last 24 hours) at 6/3/2022 1139  Last data filed at 6/3/2022 1000  Gross per 24 hour   Intake 1621 94 ml   Output --   Net 1621 94 ml       Physical Exam:     General Appearance: Alert, appears stated age and cooperative  Sitting comfortably in chair  No asterixis  No jaundice    Alert and oriented  Lungs: Clear to auscultation bilaterally, no rales or rhonchi, no labored breathing/accessory muscle use  Heart: Regular rate and rhythm, S1, S2 normal, no murmur, click, rub or gallop  Abdomen: Soft, non-tender, non-distended; bowel sounds normal; no masses or no organomegaly  Extremities: + trace pitting edema bilaterally, worse on the left leg    Invasive Devices  Report    Peripheral Intravenous Line  Duration           Peripheral IV 06/01/22 Right;Ventral (anterior) Forearm 1 day    Peripheral IV 06/02/22 Distal;Right;Upper;Ventral (anterior) Arm 1 day          Line  Duration           Hemodialysis AV Fistula 03/26/21 Left Forearm 434 days                Lab Results:  Results from last 7 days   Lab Units 06/03/22  1016   WBC Thousand/uL 4 60   HEMOGLOBIN g/dL 8 6*   HEMATOCRIT % 27 0*   PLATELETS Thousands/uL 85*   NEUTROS PCT % 56   LYMPHS PCT % 19   MONOS PCT % 21*   EOS PCT % 4     Results from last 7 days   Lab Units 06/03/22  0549   POTASSIUM mmol/L 4 6   CHLORIDE mmol/L 103   CO2 mmol/L 24   BUN mg/dL 58*   CREATININE mg/dL 9 17*   CALCIUM mg/dL 8 4   ALK PHOS U/L 80 ALT U/L 20   AST U/L 36     Invalid input(s): BILI  Results from last 7 days   Lab Units 06/03/22  0549   INR  1 84*     Results from last 7 days   Lab Units 06/01/22  0940   LIPASE u/L 50       Imaging Studies: I have personally reviewed pertinent imaging studies  CT abdomen pelvis wo contrast    Result Date: 6/1/2022  Impression: 1  Hepatic cirrhosis and third spacing with partially imaged probable pulmonary edema and small-to-moderate right and trace left pleural effusions, anasarca and small volume ascites  2   Wall thickening of the small bowel and ascending colon may be due to portal enteropathy/colopathy or infectious enteritis/colitis  3   Pancreatic atrophy which is less pronounced at the pancreatic head  No discernible pancreatic head mass is definitively identified on this unenhanced study  The March 31, 2022 contrast enhancement CT abdomen and pelvis report from [facility] in [facility] was reviewed (images were unavailable time of study interpretation)  Said study recommended follow-up MRI of the pancreas for assessment for an underlying pancreatic lesion  Workstation performed: [id number]     XR chest 1 view portable    Result Date: 6/1/2022  Impression: Persistent thoracic aortic dilatation, cardiomegaly No acute cardiopulmonary disease   Workstation performed: [id number]

## 2022-06-04 NOTE — DISCHARGE INSTR - AVS FIRST PAGE
Dear Megan Klein,     It was our pleasure to care for you here at [facility], [facility]  It is our hope that we were always able to exceed the expected standards for your care during your stay  You were hospitalized due to ***  You were cared for on the *** floor by Amanda Amaya MD under the service of Elvira Woods MD with the [facility] who covers for your primary care physician (PCP), Mahsa Munoz, while you were hospitalized  If you have any questions or concerns related to this hospitalization, you may contact us at [phone]  For follow up as well as any medication refills, we recommend that you follow up with your primary care physician  A registered nurse will reach out to you by phone within a few days after your discharge to answer any additional questions that you may have after going home  However, at this time we provide for you here, the most important instructions / recommendations at discharge:     Notable Medication Adjustments -   Continue antibiotic cefdinir and Flagyl for 1 more day   Continue vancomycin capsule 125 mg twice a day through 6/9  Midodrine dose increased from 5 mg to 10 mg 3 times a day   Continue lactulose and rifaximin for your liver cirrhosis  Testing Required after Discharge -   None  Important follow up information -   Follow-up with primary care doctor within 1 week  Follow-up With gastroenterology office  Follow-up with cardiology in 1 week since home metoprolol has been decreased upon discharge due to low heart rates  Other Instructions -   Hold metoprolol if heart rates are below 60 beats per minute  Please review this entire after visit summary as additional general instructions including medication list, appointments, activity, diet, any pertinent wound care, and other additional recommendations from your care team that may be provided for you        Sincerely,     Katy Olivier Maciel Roe MD

## 2022-06-04 NOTE — ASSESSMENT & PLAN NOTE
Electrolyte abnormalities POA in setting of ESRD after missed dialysis   Resolved  Continue dialysis Monday/Wednesday/Friday

## 2022-06-04 NOTE — ASSESSMENT & PLAN NOTE
CT abdomen/pelvis:  Wall thickening of the small bowel and ascending colon may be due to portal enteropathy/colopathy or infectious enteritis/colitis  Patient has received ceftriaxone, Flagyl, vancomycin while inpatient  Patient will be discharged on cefdinir, Flagyl for 1 more day  Patient will continue vancomycin 3 days after discontinuation of cefdinir/Flagyl

## 2022-06-04 NOTE — PROGRESS NOTES
Patient discharged and taken to lobby with his wife and son  Patient educated about his antibiotics and medication changes

## 2022-06-04 NOTE — DISCHARGE SUMMARY
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  Assessment & Plan  Diagnosis:  C diff colonization/colitis    Plan:  Stool enteric panel still in progress, C diff  showing colonization,  Prophylactic vancomycin PO medication while on antibiotics and continue 3 days after antibiotics    Colitis  Assessment & Plan  CT abdomen/pelvis:  Wall thickening of the small bowel and ascending colon may be due to portal enteropathy/colopathy or infectious enteritis/colitis  Patient has received ceftriaxone, Flagyl, vancomycin while inpatient  Patient will be discharged on cefdinir, Flagyl for 1 more day  Patient will continue vancomycin 3 days after discontinuation of cefdinir/Flagyl  Elevated MCV  Assessment & Plan  B12, folate and TSH normal    Hyperkalemia  Assessment & Plan  Electrolyte abnormalities POA in setting of ESRD after missed dialysis   Resolved  Continue dialysis Monday/Wednesday/Friday    Pancreatic mass  Assessment & Plan  MRI abdomen/pelvis reveals no pancreatic mass    Septic shock (HCC)  Assessment & Plan  Septic shock  could be infection/volume depletion/3rd spacing    Plan:     Continue Midodrine 10 mg t i d    Continue antibiotic cefdinir and Flagyl for 1 more day   Continue vancomycin capsule 125 mg twice a day through 6/9             Atrial fibrillation with rapid ventricular response (HCC)  Assessment & Plan  Diagnosis:  Paroxysmal Atrial fibrillation/sinus bradycardia     Plan:  Heart rate on the low 50s, with low blood pressure, resume Eliquis, metoprolol decreased to 12 5 mg b i d  Patient may hold metoprolol if heart rates are less than 60 beats per minute     Patient to follow-up with cardiology in 1 week    Hepatic cirrhosis ([address] )  Assessment & Plan  Plan:    Close follow-up with GI in outpatient setting upon discharge  Continue rifaximin and lactulose  ·     Anemia  Assessment & Plan  Recent Labs     06/02/22  0626 06/03/22  1016 06/04/22  0503   HGB 8 1* 8 6* 8 5*     · stable      ESRD (end stage renal disease) on dialysis [facility])  Assessment & Plan  Lab Results   Component Value Date    EGFR 5 06/04/2022    EGFR 4 06/03/2022    EGFR 5 06/02/2022    CREATININE 7 87 (H) 06/04/2022    CREATININE 9 17 (H) 06/03/2022    CREATININE 7 80 (H) 06/02/2022     Continue MWF dialysis    Acute on chronic diastolic (congestive) heart failure (HCC)  Assessment & Plan  Wt Readings from Last 3 Encounters:   06/04/22 86 8 kg (191 lb 5 8 oz)   06/01/22 84 7 kg (186 lb 11 7 oz)   05/24/22 83 9 kg (185 lb)     10/06/2020 EF of 31% grade 2 diastolic dysfunction mild stenosis of the aorta     Plan:    · Repeat  Echo:  EF of 55% with grade 2 diastolic dysfunction with mild to moderate stenosis of the aortic valve, with moderate to severe regurgitation of the tricuspid valve          Medical Problems             Resolved Problems  Date Reviewed: 5/30/2022   None               Discharging Resident: David Swanson DO  Discharging Attending: Tera Vila MD  PCP: EL Jones  Admission Date:   Admission Orders (From admission, onward)     Ordered        06/01/22 1251  Inpatient Admission  Once                      Discharge Date: 06/04/22    Consultations During Hospital Stay:  · Nephrology  · Gastroenterology  · Initially with critical care    Procedures Performed:   · Underwent hemodialysis on 06/01 and 06/03    Significant Findings / Test Results:   · Hypertension, Bradycardia, tachypnea, hypoxia  · Shock with unknown etiology, possibly GI infection/volume depletion/3rd spacing  · Hyperammonemia  · Hyperkalemia  · Macrocytic anemia  · C diff colonization   · Chest x-ray revealing persistent thoracic aortic dilatation, cardiomegaly  · CT abdomen/pelvis: 1   Hepatic cirrhosis and third spacing with partially imaged probable pulmonary edema and small-to-moderate right and trace left pleural effusions, anasarca and small volume ascites  2   Wall thickening of the small bowel and ascending colon may be due to portal enteropathy/colopathy or infectious enteritis/colitis  3   Pancreatic atrophy which is less pronounced at the pancreatic head   No discernible pancreatic head mass is definitively identified on this unenhanced study   The March 31, 2022 contrast enhancement CT abdomen and pelvis report from [facility] in [facility] was reviewed (images were unavailable time of study interpretation)   Said study recommended follow-up MRI of the pancreas for assessment for an underlying pancreatic lesion  · MRI abdomen with/without contrast and MRCP: Degraded by respiratory motion  1   No pancreatic head mass  2   Normal biliary tree  3   Cirrhosis with splenomegaly  4   Right pleural effusion  Incidental Findings:   · See above    Test Results Pending at Discharge (will require follow up): · None     Outpatient Tests Requested:  · None    Complications:  None    Reason for Admission:  Generalized weakness after missing hemodialysis    Hospital Course:   Chang Diaz is a [de-identified] y o  male patient who originally presented to the hospital on 6/1/2022 due to generalized weakness after missing hemodialysis  Patient also had associated diarrhea and acute change in mental status    Initially placed with ICU team  Patient has history of ESRD on hemodialysis M/W/F however he had missed 2 hemodialysis sessions prior to ED arrival   Patient was hypotensive and bradycardic POA and placed on Levophed to maintain map greater than 65 as well as increasing home midodrine to 10 mg t i d  Blood pressures eventually stabilized without the need of Levophed which was then discontinued  He was maintained and will be discharged on midodrine 10 mg t i d  His home metoprolol was stopped due to his bradycardia and he will be discharged on half home dose metoprolol and advised to follow up with Cardiology within 1 week upon discharge  Patient has history of hepatic cirrhosis exhibiting hepatic encephalopathy with hyperammonemia  He was continued on lactulose and rifaximin  He arrived electrolyte abnormalities, particularly hyperkalemia, for which Nephrology was consulted and he underwent hemodialysis 2 times during his stay  There was concerned that his shock was caused by infection given imaging revealing possible colitis  There was also a pancreatic mass that was noted on CT abdomen/pelvis but was not visualized with MRI abdomen  He also tested positive for C diff colonization  Patient was placed on antibiotics for these infections despite no leukocytosis and remaining afebrile throughout stay  Patient will be continued on cefdinir and metronidazole for 1 more day after discharge  Patient will complete oral vancomycin 3 days after discontinuation of cefdinir/metronidazole  Advised to follow-up with Cardiology, Gastroenterology, PCP in outpatient setting  Patient feels significantly improved and is stable for discharge today  Wife updated via phone  Please see above list of diagnoses and related plan for additional information  Condition at Discharge: fair    Discharge Day Visit / Exam:   Subjective:  Patient seen and examined at bedside  Patient has no acute complaints  Patient denies chest pain, shortness of breath, abdominal pain, confusion     Vitals: Blood Pressure: (!) 88/54 (06/04/22 1100)  Pulse: 63 (06/04/22 1100)  Temperature: 97 5 °F (36 4 °C) (06/04/22 1100)  Temp Source: Oral (06/04/22 1100)  Respirations: 18 (06/03/22 2300)  Height: 5' 7" (170 2 cm) (06/02/22 0709)  Weight - Scale: 86 8 kg (191 lb 5 8 oz) (06/04/22 0251)  SpO2: 98 % (06/03/22 2300)  Exam:   Physical Exam  Vitals reviewed  Constitutional:       General: He is not in acute distress  Appearance: He is not toxic-appearing  HENT:      Head: Normocephalic and atraumatic  Eyes:      Extraocular Movements: Extraocular movements intact  Conjunctiva/sclera: Conjunctivae normal    Cardiovascular:      Rate and Rhythm: Normal rate and regular rhythm  Pulses: Normal pulses  Pulmonary:      Effort: Pulmonary effort is normal  No respiratory distress  Breath sounds: Normal breath sounds  Abdominal:      General: Bowel sounds are normal       Palpations: Abdomen is soft  Musculoskeletal:      Right lower leg: Edema present  Left lower leg: Edema present  Skin:     General: Skin is warm and dry  Neurological:      Mental Status: He is alert and oriented to person, place, and time  Mental status is at baseline  Psychiatric:         Mood and Affect: Mood normal          Behavior: Behavior normal           Discussion with Family: Updated  (wife) via phone  Discharge instructions/Information to patient and family:   See after visit summary for information provided to patient and family  Provisions for Follow-Up Care:  See after visit summary for information related to follow-up care and any pertinent home health orders  Disposition:   Home    Planned Readmission: none     Discharge Medications:  See after visit summary for reconciled discharge medications provided to patient and/or family        **Please Note: This note may have been constructed using a voice recognition system**

## 2022-06-06 NOTE — PROGRESS NOTES
Cardiology Follow Up    Cornelia Sharpe  1940  [address] [facility] [address]  [phone]    1  Chronic diastolic heart failure ([facility] [address] )     2  PAF (paroxysmal atrial fibrillation) [facility])  Ambulatory Referral to Cardiology   3  Benign essential hypertension  Ambulatory Referral to Cardiology   4  Hypotension, unspecified hypotension type     5  Nonrheumatic tricuspid valve regurgitation     6  Nonrheumatic aortic valve stenosis     7  Dyslipidemia     8  ESRD on hemodialysis ([facility] )     9  Anemia of chronic renal failure, unspecified CKD stage     10  Other cirrhosis of liver [facility])         Interval History:   Mr Jasmin Casillas was admitted to [address] [place] [address] on 6/01 - 6/04/22 with acute hepatic encephalopathy  Mr Sharpe presented to the ED with weakness  He missed 2 HD sessions  On admission he was found to be hypotensive and bradycardic  He was admitted to the ICU  Was placed on Levophed and started on midodrine 10 mg t i d  Blood pressure stabilized Levophed was discontinued  TTE LVEF 96%,  Systolic function normal wall motion normal diastolic function mildly abnormal consistent with grade 2 pseudonormal relaxation  Right ventricular cavity size mildly dilated systolic function normal   Left atrium mildly dilated, right atrium moderately dilated  Aortic valve trileaflet moderately thickened mildly calcified moderate reduced mobility mild regurgitation, mild-to-moderate aortic stenosis  Aortic valve mean gradient 18 mmHg  Aortic valve area 1 41 cm2  Mild MR  Moderate to severe TR  Metoprolol was stopped due to bradycardia he was discharged home on half dose of metoprolol and advised to follow up with Cardiology in 1 week  He has a history of hepatic cirrhosis exhibiting hepatic encephalopathy with hyper ammonia anemia  Continued on lactulose and rifaximin      He underwent hemodialysis while in the hospital   There is a pancreatic mass noted on CT of the abdomen pelvis but not visualized with MRI of the abdomen  He tested positive for C diff colonization  Placed on antibiotics  He was advised to follow-up with Cardiology Gastroenterology and PCP at discharge  He was discharged home  Mr Cody Potter our office for follow-up visit  He is accompanied by his neighbor who assist him and his wife with care  Elizabeth Treviño resides with his wife  He is sitting in a wheelchair and admits to fatigue and Dariel LE edema  He denies CP, or palpitations  He admits to shortness of breath with walking around the house  Medical History   Resides with wife   Chronic HFpEF LVEF 60%  Dyslipidemia   Paroxysmal atrial fibrillation on Eliquis 2 5mg BID for stroke prevention   12/2019 sp  Type a aortic dissection status post emergent ascending aortic replacement and hemiarch reconstruction with AV re suspension  Chronic hypotension  ESRD on HD M- W- F  GERD  BPH    Patient Active Problem List   Diagnosis    Rotator cuff tear arthropathy, right    Secondary osteoarthritis of right shoulder    Benign essential hypertension    Overweight (BMI 25 0-29  9)    Chronic bilateral low back pain without sciatica    Benign prostatic hyperplasia without lower urinary tract symptoms    Bradycardia    Dissection of thoracic aorta (HCC)    S/P aorta repair    Thrombocytopenia (HCC)    Hyperphosphatemia    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    Hyperlipidemia    Sleep disorder    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    Monoclonal gammopathy    Paroxysmal atrial fibrillation (HCC)    GONZALES (dyspnea on exertion)    Acute on chronic diastolic (congestive) heart failure (HCC)    Chronic kidney disease-mineral and bone disorder    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 ([address] )    Rheumatoid arthritis ([address] )    Atrial fibrillation ([address] )    Insomnia    ESRD (end stage renal disease) on dialysis ([place] [address] )    Status post placement of arteriovenous graft    Vision loss    Candidal intertrigo    Chronic diastolic CHF (congestive heart failure) (HCC)    GI bleed    Anemia    Coagulopathy (HCC)    Hypertensive heart disease with heart failure (HCC)    Rotator cuff arthropathy, left    Hepatic cirrhosis (HCC)    Atrial fibrillation with rapid ventricular response (HCC)    Septic shock (HCC)    Status post aortic dissection repair    Generalized weakness    Acute hepatic encephalopathy    Pancreatic mass    Hyperkalemia    Elevated MCV    Colitis    Clostridium difficile carrier     Past Medical History:   Diagnosis Date    Acute renal failure superimposed on stage 4 chronic kidney disease ([place] [address] ) 10/4/2020    Arthritis     BPH without urinary obstruction     CKD (chronic kidney disease), stage III (HCC)     baseline 1 6-1 7    Dialysis patient ([place] [address] )     GERD (gastroesophageal reflux disease)     Hyperlipidemia     Hypertension     MI (myocardial infarction) ([place] [address] )      Social History     Socioeconomic History    Marital status: /Civil Union     Spouse name: Not on file    Number of children: Not on file    Years of education: Not on file    Highest education level: Not on file   Occupational History    Not on file   Tobacco Use    Smoking status: Never Smoker    Smokeless tobacco: Never Used   Vaping Use    Vaping Use: Never used   Substance and Sexual Activity    Alcohol use: Not Currently    Drug use: Not Currently    Sexual activity: Not Currently     Partners: Female   Other Topics Concern    Not on file   Social History Narrative    ** Merged History Encounter **         Daily caffeine consumption, 1 serving a day  Drinks coffee      Social Determinants of Health     Financial Resource Strain: Not on file   Food Insecurity: No Food Insecurity    Worried About Running Out of Food in the Last Year: Never true   [facility] Ran Out of Food in the Last Year: Never true   Transportation Needs: No Transportation Needs    Lack of Transportation (Medical): No    Lack of Transportation (Non-Medical): No   Physical Activity: Not on file   Stress: Not on file   Social Connections: Not on file   Intimate Partner Violence: Not on file   Housing Stability: Unknown    Unable to Pay for Housing in the Last Year: No    Number of Places Lived in the Last Year: Not on file    Unstable Housing in the Last Year: No      Family History   Problem Relation Age of Onset    No Known Problems Mother     Hypertension Father    [employer] Arthritis Family     No Known Problems Daughter      Past Surgical History:   Procedure Laterality Date    APPENDECTOMY      CARDIAC SURGERY      COLONOSCOPY  2017    FRACTURE SURGERY      IR AV FISTULA/GRAFT DECLOT  9/3/2021    IR AV FISTULA/GRAFT DECLOT  9/30/2021    IR AV FISTULA/GRAFT DECLOT  1/7/2022    IR PERITONEAL DIALYSIS CATHETER PLACEMENT  2/3/2021    IR PERITONEAL DIALYSIS CATHETER REMOVAL  2/17/2021    IR PERITONEAL DIALYSIS CATHETER REMOVAL AND PLACEMENT NEW SITE  2/9/2021    IR TEMPORARY DIALYSIS CATHETER PLACEMENT  12/23/2019    IR THORACENTESIS  12/24/2019    IR THORACENTESIS  12/27/2019    IR TUNNELED CENTRAL LINE REMOVAL  4/27/2021    IR TUNNELED DIALYSIS CATHETER CHECK/CHANGE/REPOSITION/ANGIOPLASTY  1/6/2020    IR TUNNELED DIALYSIS CATHETER PLACEMENT  1/2/2020    IR TUNNELED DIALYSIS CATHETER PLACEMENT  2/15/2021    IR TUNNELED DIALYSIS CATHETER REMOVAL  3/4/2020    NH ANASTOMOSIS,AV,ANY SITE Left 3/26/2021    Procedure: LEFT FOREARM LOOP AV GRAFT;  Surgeon: Obed Frey MD;  [facility];  Service: Vascular    NH ASCEND AORTA GRAFT W ROOT REPLACMENT  VALVE CONDUIT/CORON RECONSTRUCT N/A 12/15/2019    Procedure: BENTALL PROCEDURE (ASCENDING AORTIC REPAIR) with 26mm Gelweave Graft;  Surgeon: Brittney Hurst DO;  [facility];  Service: Cardiac Surgery    NH RECONSTR TOTAL SHOULDER IMPLANT Right 12/5/2017    Procedure: ARTHROPLASTY SHOULDER REVERSE with OPEN CYST EXCISION;  Surgeon: Leigh Ann Bradley MD;  [facility];  Service: Orthopedics    SHOULDER SURGERY      WRIST SURGERY         Current Outpatient Medications:     apixaban (Eliquis) 2 5 mg, Take 1 tablet (2 5 mg total) by mouth 2 (two) times a day, Disp: 180 tablet, Rfl: 3    atorvastatin (LIPITOR) 40 mg tablet, TAKE 1 TABLET (40 MG TOTAL) BY MOUTH DAILY, Disp: 90 tablet, Rfl: 0    cefdinir (OMNICEF) 300 mg capsule, Take 1 capsule (300 mg total) by mouth every 24 hours for 1 day, Disp: 1 capsule, Rfl: 0    docusate sodium (COLACE) 100 mg capsule, Take 1 capsule (100 mg total) by mouth in the morning and 1 capsule (100 mg total) in the evening , Disp: 180 capsule, Rfl: 0    doxercalciferol (HECTOROL) 4 mcg/2 mL, Infuse 1 5 mL (3 mcg total) into a venous catheter After Dialysis (with dialysis), Disp: 2 mL, Rfl: 0    epoetin uday (EPOGEN,PROCRIT) 4,000 units/mL, Inject 0 6 mL (2,400 Units total) under the skin After Dialysis (anemia), Disp: , Rfl: 0    gabapentin (NEURONTIN) 100 mg capsule, TAKE 1 CAPSULE BY MOUTH DAILY IN THE EVENING, Disp: 30 capsule, Rfl: 5    [START ON 6/10/2022] iron sucrose 50 mg in sodium chloride 0 9 % 100 mL IVPB, Infuse 50 mg into a venous catheter once a week, Disp: , Rfl: 0    ketoconazole (NIZORAL) 2 % cream, APPLY TOPICALLY DAILY, Disp: 30 g, Rfl: 2    lactulose 20 g/30 mL, Take 30 mL (20 g total) by mouth daily, Disp: 900 mL, Rfl: 0    lidocaine (LIDODERM) 5 %, Apply 1 patch topically daily Remove & Discard patch within 12 hours or as directed by MD, Disp: 30 patch, Rfl: 0    lidocaine-prilocaine (EMLA) cream, Apply topically as needed for mild pain (with HD sessions) With dialysis sessions, Disp: 30 g, Rfl: 3    metoprolol succinate (TOPROL-XL) 25 mg 24 hr tablet, Take 0 5 tablets (12 5 mg total) by mouth 2 (two) times a day, Disp: 60 tablet, Rfl: 0    metroNIDAZOLE (FLAGYL) 500 mg tablet, Take 1 tablet (500 mg total) by mouth every 8 (eight) hours for 5 doses, Disp: 5 tablet, Rfl: 0    midodrine (PROAMATINE) 10 MG tablet, Take 1 tablet (10 mg total) by mouth 3 (three) times a day before meals, Disp: 90 tablet, Rfl: 0    nystatin (MYCOSTATIN) powder, Apply topically 3 (three) times a day as needed (rash), Disp: 45 g, Rfl: 1    pantoprazole (PROTONIX) 40 mg tablet, Take 1 tablet (40 mg total) by mouth daily in the early morning, Disp: 30 tablet, Rfl: 2    rifaximin (XIFAXAN) 550 mg tablet, Take 1 tablet (550 mg total) by mouth every 12 (twelve) hours, Disp: 60 tablet, Rfl: 0    vancomycin (VANCOCIN) 125 MG capsule, Take 1 capsule (125 mg total) by mouth 2 (two) times a day for 9 doses, Disp: 9 capsule, Rfl: 0  No Known Allergies    Labs:  No results displayed because visit has over 200 results        Admission on 05/10/2022, Discharged on 05/13/2022   Component Date Value    WBC 05/10/2022 4 57     RBC 05/10/2022 2 99 (A)    Hemoglobin 05/10/2022 10 2 (A)    Hematocrit 05/10/2022 30 5 (A)    MCV 05/10/2022 102 (A)    MCH 05/10/2022 34 1     MCHC 05/10/2022 33 4     RDW 05/10/2022 15 9 (A)    MPV 05/10/2022 10 9     Platelets 26/97/2709 78 (A)    nRBC 05/10/2022 0     Neutrophils Relative 05/10/2022 75     Immat GRANS % 05/10/2022 1     Lymphocytes Relative 05/10/2022 12 (A)    Monocytes Relative 05/10/2022 11     Eosinophils Relative 05/10/2022 1     Basophils Relative 05/10/2022 0     Neutrophils Absolute 05/10/2022 3 42     Immature Grans Absolute 05/10/2022 0 04     Lymphocytes Absolute 05/10/2022 0 56 (A)    Monocytes Absolute 05/10/2022 0 50     Eosinophils Absolute 05/10/2022 0 04     Basophils Absolute 05/10/2022 0 01     Sodium 05/10/2022 138     Potassium 05/10/2022 5 3     Chloride 05/10/2022 103     CO2 05/10/2022 24     ANION GAP 05/10/2022 11     BUN 05/10/2022 64 (A)    Creatinine 05/10/2022 10 57 (A)    Glucose 05/10/2022 106     Calcium 05/10/2022 8 6     Corrected Calcium 05/10/2022 9 4     AST 05/10/2022 42 (A)    ALT 05/10/2022 25     Alkaline Phosphatase 05/10/2022 107 (A)    Total Protein 05/10/2022 7 5     Albumin 05/10/2022 3 0 (A)    Total Bilirubin 05/10/2022 1 20 (A)    eGFR 05/10/2022 4     Lipase 05/10/2022 35     Ammonia 05/10/2022 167 (A)    Protime 05/10/2022 18 4 (A)    INR 05/10/2022 1 54 (A)    PTT 05/10/2022 45 (A)    hs TnI 0hr 05/10/2022 54 (A)    SARS-CoV-2 05/10/2022 Negative     INFLUENZA A PCR 05/10/2022 Negative     INFLUENZA B PCR 05/10/2022 Negative     RSV PCR 05/10/2022 Negative     hs TnI 2hr 05/10/2022 48     Delta 2hr hsTnI 05/10/2022 -6     hs TnI 4hr 05/10/2022 47     Delta 4hr hsTnI 05/10/2022 -7     NT-proBNP 05/10/2022 22,047 (A)    MRSA Culture Only 05/11/2022 No Methicillin Resistant Staphlyococcus aureus (MRSA) isolated     Total Bilirubin 05/11/2022 1 12 (A)    Bilirubin, Direct 05/11/2022 0 27 (A)    Alkaline Phosphatase 05/11/2022 86     AST 05/11/2022 35     ALT 05/11/2022 20     Total Protein 05/11/2022 6 6     Albumin 05/11/2022 2 8 (A)    WBC 05/11/2022 5 18     RBC 05/11/2022 2 73 (A)    Hemoglobin 05/11/2022 9 2 (A)    Hematocrit 05/11/2022 27 8 (A)    MCV 05/11/2022 102 (A)    MCH 05/11/2022 33 7     MCHC 05/11/2022 33 1     RDW 05/11/2022 15 9 (A)    Platelets 27/79/9799 70 (A)    MPV 05/11/2022 11 1     Sodium 05/11/2022 138     Potassium 05/11/2022 4 7     Chloride 05/11/2022 104     CO2 05/11/2022 20 (A)    ANION GAP 05/11/2022 14 (A)    BUN 05/11/2022 71 (A)    Creatinine 05/11/2022 11 19 (A)    Glucose 05/11/2022 84     Calcium 05/11/2022 8 9     eGFR 05/11/2022 3     Ventricular Rate 05/10/2022 70     Atrial Rate 05/10/2022 98     IN Interval 05/10/2022 0     QRSD Interval 05/10/2022 72     QT Interval 05/10/2022 520     QTC Interval 05/10/2022 562     QRS Axis 05/10/2022 80     T Wave Axis 05/10/2022 -7     WBC 05/12/2022 4 45     RBC 05/12/2022 2 72 (A)    Hemoglobin 05/12/2022 9 1 (A)    Hematocrit 05/12/2022 27 4 (A)    MCV 05/12/2022 101 (A)    MCH 05/12/2022 33 5     MCHC 05/12/2022 33 2     RDW 05/12/2022 15 9 (A)    Platelets 81/38/9555 70 (A)    MPV 05/12/2022 11 9     Sodium 05/12/2022 137     Potassium 05/12/2022 3 9     Chloride 05/12/2022 99     CO2 05/12/2022 30     ANION GAP 05/12/2022 8     BUN 05/12/2022 37 (A)    Creatinine 05/12/2022 7 42 (A)    Glucose 05/12/2022 78     Calcium 05/12/2022 8 4     eGFR 05/12/2022 6     Ventricular Rate 05/12/2022 68     Atrial Rate 05/12/2022 68     QRSD Interval 05/12/2022 74     QT Interval 05/12/2022 422     QTC Interval 05/12/2022 448     QRS Axis 05/12/2022 83     T Wave Axis 05/12/2022 190     Sodium 05/13/2022 137     Potassium 05/13/2022 4 5     Chloride 05/13/2022 98     CO2 05/13/2022 29     ANION GAP 05/13/2022 10     BUN 05/13/2022 45 (A)    Creatinine 05/13/2022 8 94 (A)    Glucose 05/13/2022 84     Calcium 05/13/2022 8 7     eGFR 05/13/2022 5     WBC 05/13/2022 4 78     RBC 05/13/2022 2 89 (A)    Hemoglobin 05/13/2022 9 5 (A)    Hematocrit 05/13/2022 29 3 (A)    MCV 05/13/2022 101 (A)    MCH 05/13/2022 32 9     MCHC 05/13/2022 32 4     RDW 05/13/2022 15 7 (A)    Platelets 19/39/0128 74 (A)    MPV 05/13/2022 11 3    Admission on 05/02/2022, Discharged on 05/04/2022   Component Date Value    WBC 05/02/2022 5 03     RBC 05/02/2022 2 95 (A)    Hemoglobin 05/02/2022 9 8 (A)    Hematocrit 05/02/2022 30 2 (A)    MCV 05/02/2022 102 (A)    MCH 05/02/2022 33 2     MCHC 05/02/2022 32 5     RDW 05/02/2022 15 9 (A)    MPV 05/02/2022 11 0     Platelets 08/07/0905 97 (A)    nRBC 05/02/2022 0     Neutrophils Relative 05/02/2022 60     Immat GRANS % 05/02/2022 0     Lymphocytes Relative 05/02/2022 18     Monocytes Relative 05/02/2022 19 (A)    Eosinophils Relative 05/02/2022 3     Basophils Relative 05/02/2022 0     Neutrophils Absolute 05/02/2022 3 02     Immature Grans Absolute 05/02/2022 0 02     Lymphocytes Absolute 05/02/2022 0 90     Monocytes Absolute 05/02/2022 0 93     Eosinophils Absolute 05/02/2022 0 14     Basophils Absolute 05/02/2022 0 02     Sodium 05/02/2022 137     Potassium 05/02/2022 3 2 (A)    Chloride 05/02/2022 101     CO2 05/02/2022 31     ANION GAP 05/02/2022 5     BUN 05/02/2022 20     Creatinine 05/02/2022 3 67 (A)    Glucose 05/02/2022 170 (A)    Calcium 05/02/2022 7 9 (A)    Corrected Calcium 05/02/2022 9 3     AST 05/02/2022 50 (A)    ALT 05/02/2022 34     Alkaline Phosphatase 05/02/2022 126 (A)    Total Protein 05/02/2022 7 4     Albumin 05/02/2022 2 2 (A)    Total Bilirubin 05/02/2022 0 80     eGFR 05/02/2022 14     hs TnI 0hr 05/02/2022 54 (A)    Protime 05/02/2022 18 0 (A)    INR 05/02/2022 1 50 (A)    hs TnI 2hr 05/02/2022 53 (A)    Delta 2hr hsTnI 05/02/2022 -1     hs TnI 4hr 05/02/2022 51 (A)    Delta 4hr hsTnI 05/02/2022 -3     Magnesium 05/02/2022 2 0     TSH 3RD GENERATON 05/02/2022 2 168     TSH 3RD GENERATON 05/03/2022 2 452     WBC 05/03/2022 5 17     RBC 05/03/2022 2 76 (A)    Hemoglobin 05/03/2022 9 0 (A)    Hematocrit 05/03/2022 28 0 (A)    MCV 05/03/2022 101 (A)    MCH 05/03/2022 32 6     MCHC 05/03/2022 32 1     RDW 05/03/2022 16 2 (A)    MPV 05/03/2022 11 2     Platelets 29/60/4031 91 (A)    nRBC 05/03/2022 0     Neutrophils Relative 05/03/2022 53     Immat GRANS % 05/03/2022 1     Lymphocytes Relative 05/03/2022 23     Monocytes Relative 05/03/2022 19 (A)    Eosinophils Relative 05/03/2022 4     Basophils Relative 05/03/2022 0     Neutrophils Absolute 05/03/2022 2 80     Immature Grans Absolute 05/03/2022 0 03     Lymphocytes Absolute 05/03/2022 1 17     Monocytes Absolute 05/03/2022 0 97     Eosinophils Absolute 05/03/2022 0 18     Basophils Absolute 05/03/2022 0 02     Sodium 05/03/2022 138     Potassium 05/03/2022 4 1     Chloride 05/03/2022 101     CO2 05/03/2022 29     ANION GAP 05/03/2022 8     BUN 05/03/2022 30 (A)    Creatinine 05/03/2022 5 22 (A)    Glucose 05/03/2022 78     Calcium 05/03/2022 8 5     eGFR 05/03/2022 9     Ventricular Rate 05/02/2022 124     Atrial Rate 05/02/2022 300     QRSD Interval 05/02/2022 74     QT Interval 05/02/2022 284     QTC Interval 05/02/2022 408     QRS Axis 05/02/2022 70     T Wave Axis 05/02/2022 216     Ventricular Rate 05/02/2022 76     Atrial Rate 05/02/2022 83     DC Interval 05/02/2022 216     QRSD Interval 05/02/2022 84     QT Interval 05/02/2022 372     QTC Interval 05/02/2022 418     P Axis 05/02/2022 91     QRS Axis 05/02/2022 76     T Wave Axis 05/02/2022 113    Admission on 04/18/2022, Discharged on 04/18/2022   Component Date Value    WBC 04/18/2022 3 79 (A)    RBC 04/18/2022 2 91 (A)    Hemoglobin 04/18/2022 9 7 (A)    Hematocrit 04/18/2022 29 8 (A)    MCV 04/18/2022 102 (A)    MCH 04/18/2022 33 3     MCHC 04/18/2022 32 6     RDW 04/18/2022 16 1 (A)    MPV 04/18/2022 10 7     Platelets 52/44/1302 92 (A)    nRBC 04/18/2022 0     Neutrophils Relative 04/18/2022 60     Immat GRANS % 04/18/2022 1     Lymphocytes Relative 04/18/2022 16     Monocytes Relative 04/18/2022 18 (A)    Eosinophils Relative 04/18/2022 5     Basophils Relative 04/18/2022 0     Neutrophils Absolute 04/18/2022 2 31     Immature Grans Absolute 04/18/2022 0 02     Lymphocytes Absolute 04/18/2022 0 60     Monocytes Absolute 04/18/2022 0 68     Eosinophils Absolute 04/18/2022 0 17     Basophils Absolute 04/18/2022 0 01     Sodium 04/18/2022 140     Potassium 04/18/2022 3 7     Chloride 04/18/2022 104     CO2 04/18/2022 32     ANION GAP 04/18/2022 4     BUN 04/18/2022 26 (A)    Creatinine 04/18/2022 5 06 (A)    Glucose 04/18/2022 74     Calcium 04/18/2022 8 0 (A)    Corrected Calcium 04/18/2022 9 4     AST 04/18/2022 68 (A)    ALT 04/18/2022 34     Alkaline Phosphatase 04/18/2022 116     Total Protein 04/18/2022 7 1     Albumin 04/18/2022 2 3 (A)    Total Bilirubin 04/18/2022 0 76     eGFR 04/18/2022 9     hs TnI 0hr 04/18/2022 39     Ventricular Rate 04/18/2022 77     Atrial Rate 04/18/2022 77     WI Interval 04/18/2022 210     QRSD Interval 04/18/2022 80     QT Interval 04/18/2022 466     QTC Interval 04/18/2022 527     P Axis 04/18/2022 93     QRS Axis 04/18/2022 85     T Wave Axis 04/18/2022 138      Imaging: CT abdomen pelvis wo contrast    Result Date: 6/1/2022  Narrative: CT ABDOMEN AND PELVIS WITHOUT IV CONTRAST INDICATION:   Weakness, nausea, vomiting, diarrhea  COMPARISON:  CT chest abdomen pelvis December 27, 2019 TECHNIQUE:  CT examination of the abdomen and pelvis was performed without intravenous contrast  This examination was performed without intravenous contrast in the context of the critical nationwide Omnipaque shortage  Axial, sagittal, and coronal 2D reformatted images were created from the source data and submitted for interpretation  Radiation dose length product (DLP) for this visit:  760 mGy-cm   This examination, like all CT scans performed in the [facility], was performed utilizing techniques to minimize radiation dose exposure, including the use of iterative reconstruction and automated exposure control  Enteric contrast was not administered  FINDINGS: ABDOMEN LOWER CHEST:  Interlobular septal thickening  Small-to-moderate right and trace left pleural effusions  Cardiomegaly LIVER/BILIARY TREE:  The liver demonstrates cirrhotic morphology  Punctate calcification in the right hepatic lobe  Within the limitations of this examination there is no evidence of suspicious hepatic mass  No biliary dilatation  GALLBLADDER:  Punctate focus of hyperattenuation in the gallbladder wall may represent a small gallstone  No findings to suggest acute cholecystitis  SPLEEN:  Unremarkable   PANCREAS: Atrophy of the pancreatic body and tail   Atrophy is lesion pronounced at the pancreatic head; no discernible pancreatic head mass identified  Main pancreatic duct is not dilated  ADRENAL GLANDS:  Unremarkable  KIDNEYS/URETERS:  One or more simple renal cyst(s) is noted  Renal cortical thinning  No hydronephrosis  STOMACH AND BOWEL:  Wall thickening of the small bowel and ascending colon  No bowel obstruction  Colonic diverticulosis  APPENDIX:  There are expected postoperative changes of appendectomy  ABDOMINOPELVIC CAVITY:  Small volume perihepatic ascites  Diffuse  mesenteric edema  VESSELS:  Atherosclerotic changes are present  No evidence of aortic aneurysm  PELVIS REPRODUCTIVE ORGANS:  Hyperattenuating material within the prostate may represent surgical material, and is unchanged from 2019  URINARY BLADDER:  Nondistended, limiting assessment ABDOMINAL WALL/INGUINAL REGIONS:  Anasarca OSSEOUS STRUCTURES:  No acute fracture or destructive osseous lesion  Spinal degenerative changes are noted  Mild height loss of the L3 vertebral body is similar to 2019  Chronic fracture deformity of the inferior right pubic ramus  Impression: 1  Hepatic cirrhosis and third spacing with partially imaged probable pulmonary edema and small-to-moderate right and trace left pleural effusions, anasarca and small volume ascites  2   Wall thickening of the small bowel and ascending colon may be due to portal enteropathy/colopathy or infectious enteritis/colitis  3   Pancreatic atrophy which is less pronounced at the pancreatic head  No discernible pancreatic head mass is definitively identified on this unenhanced study  The March 31, 2022 contrast enhancement CT abdomen and pelvis report from [facility] in [facility] was reviewed (images were unavailable time of study interpretation)  Said study recommended follow-up MRI of the pancreas for assessment for an underlying pancreatic lesion   Workstation performed: [id number]     XR chest 1 view portable    Result Date: 6/1/2022  Narrative: CHEST INDICATION:   weakness  COMPARISON:  5/10/2022 EXAM PERFORMED/VIEWS:  XR CHEST PORTABLE Single view FINDINGS: Thoracic aorta remains mildly dilated  Status post median sternotomy  Persistent mild cardiomegaly The lungs are clear  No pneumothorax or pleural effusion  Reverse right shoulder arthroplasty again evident     Impression: Persistent thoracic aortic dilatation, cardiomegaly No acute cardiopulmonary disease  Workstation performed: [id number]     XR chest 1 view portable    Result Date: 5/10/2022  Narrative: CHEST INDICATION:   Cough  COMPARISON:  5/2/2022 EXAM PERFORMED/VIEWS:  XR CHEST PORTABLE FINDINGS:  There are median sternotomy wires indicating prior cardiac surgery  Some of the wires are discontinuous  Cardiomediastinal silhouette appears enlarged  The lungs are clear  No pneumothorax or pleural effusion  Right shoulder reverse arthroplasty  Impression: No acute cardiopulmonary disease  Workstation performed: [id number]     MRI abdomen w wo contrast and mrcp    Result Date: 6/3/2022  Narrative: MRI OF THE ABDOMEN WITH AND WITHOUT CONTRAST WITH MRCP INDICATION: [de-identified] years / Male  possible pancreatic mass on CT  COMPARISON: 6/1/2022 TECHNIQUE:  The following pulse sequences were obtained:  Coronal and axial T2 with TE of 90 and 180 respectively, axial T2 with fat saturation, axial FIESTA fat-sat, axial T1-weighted in-and-out-of phase, axial DWI/ADC, pre-contrast axial T1 with fat saturation, post-contrast dynamic axial T1 with fat saturation at 20, 70, and 180 seconds, followed by coronal and 7 minute delayed axial T1 with fat saturation  3D MRCP images were obtained with radial thick slabs and projections  3D rendering was performed from the acquisition scanner  Imaging performed on 1 5T MRI IV Contrast:  8 mL of Gadobutrol injection (SINGLE-DOSE) FINDINGS: T1-weighted images are degraded by respiratory motion   LOWER CHEST:   There are is a right pleural effusion  LIVER: The liver is cirrhotic  No suspicious mass  The hepatic veins and portal veins are patent  BILE DUCTS:  No intrahepatic or extrahepatic bile duct dilation  Common bile duct is normal in caliber  No choledocholithiasis, biliary stricture or suspicious mass  GALLBLADDER:  Normal  PANCREAS:  Unremarkable  ADRENAL GLANDS:  Normal  SPLEEN:  The spleen is enlarged  KIDNEYS/PROXIMAL URETERS:  No hydroureteronephrosis  No suspicious renal mass  The kidneys are atrophic  There are left renal cysts  BOWEL:   No dilated loops of bowel  PERITONEUM/RETROPERITONEUM:  No ascites  LYMPH NODES:  No abdominal lymphadenopathy  VASCULAR STRUCTURES:  No aneurysm  ABDOMINAL WALL:  Unremarkable  OSSEOUS STRUCTURES:  No suspicious osseous lesion  Impression: Degraded by respiratory motion  1   No pancreatic head mass  2   Normal biliary tree  3   Cirrhosis with splenomegaly  4   Right pleural effusion  Workstation performed: [id number]     US right upper quadrant with liver dopplers    Result Date: 5/11/2022  Narrative: RIGHT UPPER QUADRANT ULTRASOUND WITH LIVER DOPPLER INDICATION:     investigate cirrhosis  With doppler  COMPARISON:  None  TECHNIQUE:   Real-time ultrasound of the right upper quadrant was performed with a curvilinear transducer with both volumetric sweeps and still imaging techniques  FINDINGS: PANCREAS:  11 x 8 mm hypoechoic lesion is identified within the pancreatic head  AORTA AND IVC:  Visualized portions are normal for patient age  LIVER: Size:  Within normal range  The liver measures 13 9 cm in the midclavicular line  Contour:  Nodular  Parenchyma:  Echogenicity and echotexture are within normal limits  No liver mass identified  LIVER DOPPLER: Bidirectional flow is noted within the portal vein  Hepatic veins are patent  Spectral waveforms within normal limits  Main hepatic artery appears normal size, patent with normal spectral waveform  BILIARY: No bladder sludge is noted  Nonspecific gallbladder wall thickening is noted in the presence of ascites  No intrahepatic biliary dilatation  CBD measures 5 0 mm  No choledocholithiasis  KIDNEY: Right kidney measures 6 7 x 4 4 x 3 7 cm  Volume 57 0 mL Atrophic  ASCITES:   Small volume of ascites was noted  Impression: Nodular hepatic contour in keeping with cirrhosis  Bidirectional flow noted within the portal vein  Small volume of ascites  Gallbladder sludge and nonspecific gallbladder wall thickening in the presence of ascites  Workstation performed: [id number]     Echo complete w/ contrast if indicated    Result Date: 6/2/2022  Narrative: Valentine Tamez  Left Ventricle: Left ventricular cavity size is normal  Wall thickness is normal  The left ventricular ejection fraction is 55%  Systolic function is normal  Wall motion is normal  Diastolic function is moderately abnormal, consistent with grade II (pseudonormal) relaxation    Right Ventricle: Right ventricular cavity size is mildly dilated  Systolic function is normal    Left Atrium: The atrium is mildly dilated    Right Atrium: The atrium is moderately dilated    Aortic Valve: The aortic valve is trileaflet  The leaflets are moderately thickened  The leaflets are mildly calcified  There is moderately reduced mobility  There is mild regurgitation  There is mild to moderate stenosis  The aortic valve mean gradient is 18 mmHg  The DVI is 0 37  The aortic valve area is 1 41 cm2    Mitral Valve: There is mild regurgitation    Tricuspid Valve: There is moderate to severe regurgitation  The estimated right ventricular systolic pressure is 17 83 mmHg  Review of Systems:  Review of Systems   Constitutional: Positive for fatigue  Respiratory: Positive for shortness of breath  Cardiovascular: Positive for leg swelling  Musculoskeletal: Positive for arthralgias, gait problem and myalgias  All other systems reviewed and are negative  Physical Exam:  Physical Exam  Vitals reviewed  Constitutional:       Comments: Elderly fatigued male sitting in a wheelchair    Neck:      Comments: + V wave   Cardiovascular:      Rate and Rhythm: Normal rate and regular rhythm  Heart sounds: Murmur heard  Comments: 3/6 LULU  Abdominal:      General: Bowel sounds are normal  There is distension  Musculoskeletal:         General: Normal range of motion  Cervical back: Normal range of motion and neck supple  Right lower leg: Edema present  Left lower leg: Edema present  Comments: 1+ - 2+ blanca LE pedal and ankle edema    Skin:     General: Skin is warm and dry  Capillary Refill: Capillary refill takes less than 2 seconds  Comments: Left AV fistula    Neurological:      General: No focal deficit present  Mental Status: He is alert and oriented to person, place, and time  Psychiatric:         Mood and Affect: Mood normal          Behavior: Behavior normal          Discussion/Summary:  1  Chronic HFpEF LVEF 55% NYHA class III stage C-   Weight in the office 192 lb  Heart rate 76 beats per minute blood pressure 100/50 hemodialysis keep the patient euvolemic  Continue metoprolol succinate 12 5 mg b i d   2  Paroxysmal atrial fibrillation [id number]= 5 continue on Eliquis 2 5mg BID for stroke prevention,  Metoprolol succinate 12 5 mg b i d   3  Chronic Hypotension RUE sitting 100/50  Continue midodrine 10 mg t i d   4  Mod to severe TR  5  Mild to Moderate AS EDITH 1 41cm2, continue to monitor,not a good candidate for TAVR  6  Dyslipidemia 9/20/21 , TG 71, HDL 51, LDL 71,  Continue Lipitor 40 mg daily  7  ESRD on HD M- W- F  8  Anemia of CKD  Continues on Epogen 2500 units after hemodialysis  9   LOPEZ Cirrhosis  Continue on rifaximin and lactulose

## 2022-06-09 NOTE — PROGRESS NOTES
Attempted outreach no answer  Will send unable to reach letter  Patient does have PCP appointment at [address]  Also working with [facility]

## 2022-06-09 NOTE — LETTER
Fecha: 06/09/22    Estimado/a Oliver SEBASTIAN Core:      Jelanidanielito Yoselyn es Dept: [phone]  Soy un enfermero registrado administrador de atención de [address] [place] [id number]  No pude comunicarme con usted y me gustaría programar un horario para que hablemos por teléfono o personalmente  Me dedico a ayudar a los pacientes que tienen afecciones médicas complejas a obtener la atención que necesitan  [place] comprende a pacientes que pueden estar en el hospital o en patric tomasz de emergencias  Adjunto información para usted  Si tiene preguntas, no dude en llamarme  Espero paez llamado  Atentamente    Arlice Gretel Knight

## 2022-06-14 NOTE — TELEPHONE ENCOUNTER
Please contact patient  I missed him at office visit on 6/9 following hospital discharge (no show)  I wanted to discuss his concerns regarding sleep and pain during dialysis  Please ask him to reschedule so we can discuss  Thank you

## 2022-06-22 NOTE — CASE COMMUNICATION
Family declined Daisha Kelley visit for today as patient was weak from dialysis  LINNEA scheduled for tomorrow

## 2022-06-24 NOTE — CASE COMMUNICATION
Patient referred to PT and OT for LINNEA  Saw patient on this date who declined PT intervention as he feels it wont help him and that he was told that the dialysis isnt helping him  Granddaughter Alban Leyden stated they are waiting for his daughter to come home from vacation and then will  make a decision about Hospice

## 2022-06-29 NOTE — TELEPHONE ENCOUNTER
Thom Weaver is not a patient of the [facility] [place] office  No longer under the care of Dr Thor Robles  Will route to PCP

## 2022-07-12 PROBLEM — Z66 PHYSICIAN ORDERS FOR LIFE-SUSTAINING TREATMENT (POLST) FORM INDICATES PATIENT WISH FOR DO-NOT-RESUSCITATE STATUS: Status: ACTIVE | Noted: 2022-01-01

## 2022-07-12 PROBLEM — Z51.5 PALLIATIVE CARE PATIENT: Status: ACTIVE | Noted: 2022-01-01

## 2022-07-12 NOTE — PROGRESS NOTES
Palliative and Supportive Care   Oliver Sharpe [de-identified] y o  male [id number]    Assessment/Plan:  1  ESRD (end stage renal disease) on dialysis ([facility] )    2  End stage renal disease ([facility] )    3  Chronic diastolic CHF (congestive heart failure) ([facility] )    4  Chronic bilateral low back pain without sciatica    5  S/P aorta repair    6  Palliative care patient    7  Physician orders for life-sustaining treatment (POLST) form indicates patient wish for do-not-resuscitate status      Requested Prescriptions     Signed Prescriptions Disp Refills    oxyCODONE (ROXICODONE) 5 immediate release tablet 30 tablet 0     Sig: Take 0 5 tablets (2 5 mg total) by mouth 3 (three) times a day as needed for severe pain Max Daily Amount: 7 5 mg     There are no discontinued medications  PLAN:  1  Symptom management-    -will start low-dose oxycodone 2 5 mg q 8 hours p r n    -advised against using NSAID products    2  Goals of care:    Record of POLST discussion     I completed a POLST form with the patient and/or the patient's designated decision-maker  The pt is competent for medical decisions today  After discussing the problems addressed during this hospitalization, and prior to this encounter, the following limits and goals were set:    Part A)  do not Attempt Resuscitation     Part B)  limited interventions    Part C)  yes to antibiotics    Part D)  No to artificial nutrition    Additional goals)  avoid hospitalizations     Contacts)   - Jake Fraire, daughter, will act as primary contact and decision-maker for this patient  Form was then signed by myself and daughter  Provided her with original and a copy  An alternate copy will be uploaded to patient's EHR      3  Psychosocial support:  Time spent providing supportive listening    4  ACP: DNI/DNR    5   Return to care:  4 weeks, will plan to do virtual visit    Representatives have queried the patient's controlled substance dispensing history in the Prescription Drug Monitoring Program in compliance with regulations before I have prescribed any controlled substances  The prescription history is consistent with prescribed therapy and our practice policies  No follow-ups on file  Subjective: In attendance at this visit: oSfi Mcmullen and his daughter  Chief Complaint  New consultation for:  symptom management, goal of care assessment and decisional support, disease process education and discussion of prognosis, advance care planning, emotional support in the setting of serious illness  HPI     Sofi Mcmullen is a [de-identified] y o  male with end-stage renal disease on hemodialysis, chronic diastolic heart failure, liver cirrhosis, 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  Patient was referred to palliative Care for symptoms and goals related to his multiple end-stage conditions  Patient seen today with his daughter, Juan Wallace, he has 3 other children and a spouse that continue to support him  He states that his main complaint is joint pain specifically in his knees and shoulders  He plans to see orthopedics but discussed that he is likely a poor operative candidate given his multiple comorbidities  Patient continues with dialysis and although he does not like he understands that with out this his prognosis would be weeks  He tells me he is accepting of his mortality but he is not ready to stop dialytic support  He is very clear that he would like to minimize hospitalizations and that he is a DNR  Discussed advanced care planning and they are working on a medical POA  Reviewed POLST form and they agreed to fill this out at today's visit, details are noted above  Oncology History    No history exists  The following portions of the medical history were reviewed: past medical history, problem list, medication list, and social history      Current Outpatient Medications:     gabapentin (NEURONTIN) 100 mg capsule, TAKE 1 CAPSULE BY MOUTH DAILY IN THE EVENING, Disp: 30 capsule, Rfl: 5    oxyCODONE (ROXICODONE) 5 immediate release tablet, Take 0 5 tablets (2 5 mg total) by mouth 3 (three) times a day as needed for severe pain Max Daily Amount: 7 5 mg, Disp: 30 tablet, Rfl: 0    apixaban (Eliquis) 2 5 mg, Take 1 tablet (2 5 mg total) by mouth 2 (two) times a day, Disp: 180 tablet, Rfl: 3    atorvastatin (LIPITOR) 40 mg tablet, TAKE 1 TABLET (40 MG TOTAL) BY MOUTH DAILY, Disp: 90 tablet, Rfl: 0    docusate sodium (COLACE) 100 mg capsule, Take 1 capsule (100 mg total) by mouth in the morning and 1 capsule (100 mg total) in the evening , Disp: 180 capsule, Rfl: 0    doxercalciferol (HECTOROL) 4 mcg/2 mL, Infuse 1 5 mL (3 mcg total) into a venous catheter After Dialysis (with dialysis), Disp: 2 mL, Rfl: 0    epoetin uday (EPOGEN,PROCRIT) 4,000 units/mL, Inject 0 6 mL (2,400 Units total) under the skin After Dialysis (anemia), Disp: , Rfl: 0    iron sucrose 50 mg in sodium chloride 0 9 % 100 mL IVPB, Infuse 50 mg into a venous catheter once a week, Disp: , Rfl: 0    ketoconazole (NIZORAL) 2 % cream, APPLY TOPICALLY DAILY, Disp: 30 g, Rfl: 2    lactulose 20 g/30 mL, Take 30 mL (20 g total) by mouth daily, Disp: 900 mL, Rfl: 0    lidocaine (LIDODERM) 5 %, Apply 1 patch topically daily Remove & Discard patch within 12 hours or as directed by MD, Disp: 30 patch, Rfl: 0    lidocaine-prilocaine (EMLA) cream, Apply topically as needed for mild pain (with HD sessions) With dialysis sessions, Disp: 30 g, Rfl: 3    metoprolol succinate (TOPROL-XL) 25 mg 24 hr tablet, Take 0 5 tablets (12 5 mg total) by mouth 2 (two) times a day, Disp: 60 tablet, Rfl: 0    midodrine (PROAMATINE) 10 MG tablet, TAKE 1 TABLET (10 MG TOTAL) BY MOUTH 3 (THREE) TIMES A DAY BEFORE MEALS, Disp: 90 tablet, Rfl: 0    nystatin (MYCOSTATIN) powder, Apply topically 3 (three) times a day as needed (rash) (Patient not taking: No sig reported), Disp: 45 g, Rfl: 1    pantoprazole (PROTONIX) 40 mg tablet, Take 1 tablet (40 mg total) by mouth daily in the early morning, Disp: 30 tablet, Rfl: 2  Review of Systems   Constitutional: Positive for activity change  Respiratory: Positive for cough  Cardiovascular: Positive for palpitations and leg swelling  Gastrointestinal: Positive for abdominal distention  Neurological: Positive for weakness  All other systems reviewed and are negative  All other systems negative    Objective:  Vital Signs  BP 90/54 (BP Location: Right arm, Cuff Size: Standard)   Pulse (!) 144   Temp 97 6 °F (36 4 °C) (Temporal)   Wt 87 2 kg (192 lb 3 9 oz)   SpO2 93%   BMI 30 11 kg/m²    Physical Exam    Constitutional: Appears chronically ill   In no acute physical or emotional distress  Head: Normocephalic and atraumatic  Eyes: EOM are normal  No ocular discharge  No scleral icterus  Neck: No visible adenopathy or masses  Cardiovascular: Regular rate and rhythm, palpable distal pulses   Respiratory: Effort normal  No stridor  No respiratory distress  Gastrointestinal: No abdominal distension  Abdomen is soft  Musculoskeletal: +edema  Neurological: Alert, oriented and appropriately conversant  Skin: No diaphoresis, dry and warm to touch, no rashes seen on exposed areas of skin  Psychiatric: Displays a normal mood and affect  Behavior, judgement and thought content appear normal      Portions of this document may have been created using dictation software and as such some "sound alike" terms may have been generated by the system  Do not hesitate to contact me with any questions or clarifications

## 2022-07-16 NOTE — TELEPHONE ENCOUNTER
Reason for Disposition   Patient sounds very sick or weak to the triager    Answer Assessment - Initial Assessment Questions  1  ONSET: "When did the swelling start?" (e g , minutes, hours, days)      2 weeks, is progressively getting worse    2  LOCATION: "What part of the face is swollen?"      whole face, excluding lips and tongue  Expressing that his throat feels tight  3  SEVERITY: "How swollen is it?"      "Very swollen"     4  ITCHING: "Is there any itching?" If Yes, ask: "How much?"   (Scale 1-10; mild, moderate or severe)      Denies    5  PAIN: "Is the swelling painful to touch?" If Yes, ask: "How painful is it?"   (Scale 1-10; mild, moderate or severe)      Denies    6  FEVER: "Do you have a fever?" If Yes, ask: "What is it, how was it measured, and when did it start?"       Denies    7  CAUSE: "What do you think is causing the face swelling?"      Unknown    8  RECURRENT SYMPTOM: "Have you had face swelling before?" If Yes, ask: "When was the last time?" "What happened that time?"      Has not occurred before    9   OTHER SYMPTOMS: "Do you have any other symptoms?" (e g , toothache, leg swelling)      B/l leg swelling (dialysis pt)    Protocols used: [facility]

## 2022-07-16 NOTE — TELEPHONE ENCOUNTER
Regarding: throat feels like it's closing  ----- Message from [facility] sent at 7/16/2022 10:20 AM EDT -----  "My father stated that he feels like his throat is closing and is tight    He is refusing to go to the hospital "

## 2022-07-18 NOTE — TELEPHONE ENCOUNTER
Received message from health call  Patient had facial swelling over the weekend with feeling like throat was tight  It appears he refused ED visit  May offer appointment if he would like to be seen

## 2022-07-18 NOTE — TELEPHONE ENCOUNTER
Spoke w/ pt's daughter, she states that he has not felt his throat is tight  He still has facial swelling  She thinks it's because pt fills up w/ fluid, as the day passes, swelling goes down  At this time she states opt does not need apt  Is it occurs again, she will take him to  ER  She states that pt woke up feeling weak today, he does not want to go to the ER  He prefers to stay home  His daughter also states pt has a DNR order  Pt is currently in dialysis

## 2022-07-18 NOTE — TELEPHONE ENCOUNTER
Left voicemail for patient with reminder of appointment time and duration  Explained visitor policy and left callback number for questions

## 2022-07-19 PROBLEM — R07.9 CHEST PAIN AT REST: Status: ACTIVE | Noted: 2022-01-01

## 2022-07-19 NOTE — ASSESSMENT & PLAN NOTE
Ammonia elevated at 155  Patient states he has been extremely fatigued  Continue to monitor and follow    Lactulose 20 g reordered with goal of 3 BM per day

## 2022-07-19 NOTE — ASSESSMENT & PLAN NOTE
Admission EKG demonstrates AFib, nonspecific T-wave abnormalities    Continue Eliquis 2 5 b i d  Continue metoprolol XL 12 5 b i d

## 2022-07-19 NOTE — ASSESSMENT & PLAN NOTE
Bedside examination is somewhat limited  However,  In addition to cardiomegaly, CXR shows increased bronchovascular markings, and to my read suspicion for right lower lobe pleural effusion and possible consolidation       Patient meets SIRS criteria  Elevated lactate and procalcitonin  Ceftriaxone ordered

## 2022-07-19 NOTE — ASSESSMENT & PLAN NOTE
Wt Readings from Last 3 Encounters:   07/18/22 87 kg (191 lb 12 8 oz)   07/12/22 87 2 kg (192 lb 3 9 oz)   06/07/22 87 3 kg (192 lb 8 oz)     Patient had a transient episode of chest pain with SOB lasting approximately 10 minutes around 5:00 p m  On 07/18    BNP 1529  No significant increase in body weight  No hepatojugular reflex observed  Currently holding diuresis with soft Bps    Will continue to monitor and follow

## 2022-07-19 NOTE — ED PROVIDER NOTES
History  Chief Complaint   Patient presents with    Shortness of Breath     Per family: pt was at home tonight, complained of sudden onset sharp L chest pain/SOB  Inhaler at home with no relief  +eliquis  +cardiac hx     Patient is a [de-identified]year old male with chest pain with radiation to left arm with sob tonight  No cough  No fever  No N/V  Tried inhaler without relief  Patient had dialysis today  Patient is on eliquis for a  Fib  Was last seen in this ED on 6/1/22 for acute hepatic encephalopathy  [facility] website checked on this patient and no Rx found  Patient declines pain medicine since chest pain resolved  Is supposed to have paracentesis tomorrow  ASA not given in ED since patient is on eliquis  History provided by:  Patient, relative and EMS personnel (daughter)   used: Yes    Shortness of Breath  Associated symptoms: chest pain    Associated symptoms: no cough, no fever and no vomiting        Prior to Admission Medications   Prescriptions Last Dose Informant Patient Reported? Taking? apixaban (Eliquis) 2 5 mg  Self No No   Sig: Take 1 tablet (2 5 mg total) by mouth 2 (two) times a day   atorvastatin (LIPITOR) 40 mg tablet   No No   Sig: TAKE 1 TABLET (40 MG TOTAL) BY MOUTH DAILY   docusate sodium (COLACE) 100 mg capsule  Self No No   Sig: Take 1 capsule (100 mg total) by mouth in the morning and 1 capsule (100 mg total) in the evening     doxercalciferol (HECTOROL) 4 mcg/2 mL  Self No No   Sig: Infuse 1 5 mL (3 mcg total) into a venous catheter After Dialysis (with dialysis)   epoetin uday (EPOGEN,PROCRIT) 4,000 units/mL  Self No No   Sig: Inject 0 6 mL (2,400 Units total) under the skin After Dialysis (anemia)   gabapentin (NEURONTIN) 100 mg capsule  Self No No   Sig: TAKE 1 CAPSULE BY MOUTH DAILY IN THE EVENING   iron sucrose 50 mg in sodium chloride 0 9 % 100 mL IVPB  Self No No   Sig: Infuse 50 mg into a venous catheter once a week   ketoconazole (NIZORAL) 2 % cream  Self No No   Sig: APPLY TOPICALLY DAILY   lactulose 20 g/30 mL  Self No No   Sig: Take 30 mL (20 g total) by mouth daily   lidocaine (LIDODERM) 5 %  Self No No   Sig: Apply 1 patch topically daily Remove & Discard patch within 12 hours or as directed by MD   lidocaine-prilocaine (EMLA) cream  Self No No   Sig: Apply topically as needed for mild pain (with HD sessions) With dialysis sessions   metoprolol succinate (TOPROL-XL) 25 mg 24 hr tablet   No No   Sig: Take 0 5 tablets (12 5 mg total) by mouth 2 (two) times a day   midodrine (PROAMATINE) 10 MG tablet   No No   Sig: TAKE 1 TABLET (10 MG TOTAL) BY MOUTH 3 (THREE) TIMES A DAY BEFORE MEALS   nystatin (MYCOSTATIN) powder  Self No No   Sig: Apply topically 3 (three) times a day as needed (rash)   Patient not taking: No sig reported   oxyCODONE (ROXICODONE) 5 immediate release tablet   No No   Sig: Take 0 5 tablets (2 5 mg total) by mouth 3 (three) times a day as needed for severe pain Max Daily Amount: 7 5 mg   pantoprazole (PROTONIX) 40 mg tablet  Self No No   Sig: Take 1 tablet (40 mg total) by mouth daily in the early morning      Facility-Administered Medications: None       Past Medical History:   Diagnosis Date    Acute renal failure superimposed on stage 4 chronic kidney disease ([facility] [address] ) 10/4/2020    Arthritis     BPH without urinary obstruction     CKD (chronic kidney disease), stage III (HCC)     baseline 1 6-1 7    Dialysis patient ([place] [id number] )     GERD (gastroesophageal reflux disease)     Hyperlipidemia     Hypertension     [facility]        Past Surgical History:   Procedure Laterality Date    APPENDECTOMY      CARDIAC SURGERY      COLONOSCOPY  2017    FRACTURE SURGERY      IR AV FISTULA/GRAFT DECLOT  9/3/2021    IR AV FISTULA/GRAFT DECLOT  9/30/2021    IR AV FISTULA/GRAFT DECLOT  1/7/2022    IR PERITONEAL DIALYSIS CATHETER PLACEMENT  2/3/2021    IR PERITONEAL DIALYSIS CATHETER REMOVAL  2/17/2021    IR PERITONEAL DIALYSIS CATHETER REMOVAL AND PLACEMENT NEW SITE  2/9/2021    IR TEMPORARY DIALYSIS CATHETER PLACEMENT  12/23/2019    IR THORACENTESIS  12/24/2019    IR THORACENTESIS  12/27/2019    IR TUNNELED CENTRAL LINE REMOVAL  4/27/2021    IR TUNNELED DIALYSIS CATHETER CHECK/CHANGE/REPOSITION/ANGIOPLASTY  1/6/2020    IR TUNNELED DIALYSIS CATHETER PLACEMENT  1/2/2020    IR TUNNELED DIALYSIS CATHETER PLACEMENT  2/15/2021    IR TUNNELED DIALYSIS CATHETER REMOVAL  3/4/2020    AR ANASTOMOSIS,AV,ANY SITE Left 3/26/2021    Procedure: LEFT FOREARM LOOP AV GRAFT;  Surgeon: Konrad Perales MD;  [facility];  Service: Vascular    AR ASCEND AORTA GRAFT W ROOT REPLACMENT  VALVE CONDUIT/CORON RECONSTRUCT N/A 12/15/2019    Procedure: BENTALL PROCEDURE (ASCENDING AORTIC REPAIR) with 26mm Gelweave Graft;  Surgeon: Cesar Acosta DO;  [facility];  Service: Cardiac Surgery    AR RECONSTR TOTAL SHOULDER IMPLANT Right 12/5/2017    Procedure: ARTHROPLASTY SHOULDER REVERSE with OPEN CYST EXCISION;  Surgeon: Gallito Monk MD;  [facility];  Service: Orthopedics    SHOULDER SURGERY      WRIST SURGERY         Family History   Problem Relation Age of Onset    No Known Problems Mother     Hypertension Father     Arthritis Family     No Known Problems Daughter      I have reviewed and agree with the history as documented  E-Cigarette/Vaping    E-Cigarette Use Never User      E-Cigarette/Vaping Substances    Nicotine No     THC No     CBD No     Flavoring No      Social History     Tobacco Use    Smoking status: Never Smoker    Smokeless tobacco: Never Used   Vaping Use    Vaping Use: Never used   Substance Use Topics    Alcohol use: Not Currently    Drug use: Not Currently       Review of Systems   Constitutional: Negative for fever  Respiratory: Positive for shortness of breath  Negative for cough  Cardiovascular: Positive for chest pain  Gastrointestinal: Negative for nausea and vomiting  Musculoskeletal: Positive for myalgias  All other systems reviewed and are negative  Physical Exam  Physical Exam  Vitals and nursing note reviewed  Constitutional:       General: He is in acute distress (moderate)  HENT:      Head: Normocephalic and atraumatic  Mouth/Throat:      Mouth: Mucous membranes are moist       Pharynx: Oropharynx is clear  Eyes:      General: No scleral icterus  Extraocular Movements: Extraocular movements intact  Pupils: Pupils are equal, round, and reactive to light  Cardiovascular:      Rate and Rhythm: Normal rate  Rhythm irregular  Heart sounds: Murmur heard  Pulmonary:      Breath sounds: No stridor  No wheezing or rhonchi  Comments: Breath sounds diminished bilaterally at bases    Chest:      Chest wall: No tenderness  Abdominal:      General: Bowel sounds are normal  There is distension  Tenderness: There is no abdominal tenderness  Musculoskeletal:         General: No deformity  Cervical back: Normal range of motion and neck supple  Right lower leg: Edema present  Left lower leg: Edema present  Skin:     General: Skin is warm and dry  Findings: No erythema or rash  Neurological:      General: No focal deficit present  Mental Status: He is alert and oriented to person, place, and time     Psychiatric:         Mood and Affect: Mood normal          Vital Signs  ED Triage Vitals [07/18/22 2316]   Temperature Pulse Respirations Blood Pressure SpO2   98 6 °F (37 °C) 86 20 106/57 94 %      Temp Source Heart Rate Source Patient Position - Orthostatic VS BP Location FiO2 (%)   Oral Monitor Sitting Right arm --      Pain Score       9           Vitals:    07/18/22 2316   BP: 106/57   Pulse: 86   Patient Position - Orthostatic VS: Sitting         Visual Acuity      ED Medications  Medications   thiamine (VITAMIN B1) 100 mg in sodium chloride 0 9 % 50 mL IVPB (has no administration in time range)   albuterol (FOR EMS ONLY) (2 5 mg/3 mL) 0 083 % inhalation solution 2 5 mg (0 mg Does not apply Given to EMS 7/18/22 2328)   ipratropium-albuterol (FOR EMS ONLY) (DUO-NEB) 0 5-2 5 mg/3 mL inhalation solution 3 mL (0 mL Does not apply Given to EMS 7/18/22 2328)       Diagnostic Studies  Results Reviewed     Procedure Component Value Units Date/Time    FLU/RSV/COVID - if FLU/RSV clinically relevant [[id number]] Collected: 07/19/22 0046    Lab Status: No result Specimen: Nares from Nose     Lactic acid [[id number]]  (Abnormal) Collected: 07/18/22 2339    Lab Status: Final result Specimen: Blood Updated: 07/19/22 0037     LACTIC ACID 2 8 mmol/L     Narrative:      Result may be elevated if tourniquet was used during collection      Lactic acid 2 Hours [[id number]]     Lab Status: No result Specimen: Blood     Protime-INR [[id number]]  (Abnormal) Collected: 07/18/22 2340    Lab Status: Final result Specimen: Blood Updated: 07/19/22 0035     Protime 24 3 seconds      INR 2 23    APTT [[id number]]  (Abnormal) Collected: 07/18/22 2340    Lab Status: Final result Specimen: Blood Updated: 07/19/22 0035     PTT 53 seconds     Comprehensive metabolic panel [[id number]]  (Abnormal) Collected: 07/18/22 2340    Lab Status: Final result Specimen: Blood Updated: 07/19/22 0025     Sodium 133 mmol/L      Potassium 3 5 mmol/L      Chloride 100 mmol/L      CO2 25 mmol/L      ANION GAP 8 mmol/L      BUN 25 mg/dL      Creatinine 4 40 mg/dL      Glucose 97 mg/dL      Calcium 7 7 mg/dL      Corrected Calcium 9 3 mg/dL       U/L      ALT 49 U/L      Alkaline Phosphatase 136 U/L      Total Protein 6 2 g/dL      Albumin 2 0 g/dL      Total Bilirubin 1 45 mg/dL      eGFR 11 ml/min/1 73sq m     Narrative:      [place] guidelines for Chronic Kidney Disease (CKD):     Stage 1 with normal or high GFR (GFR > 90 mL/min/1 73 square meters)    Stage 2 Mild CKD (GFR = 60-89 mL/min/1 73 square meters)    Stage 3A Moderate CKD (GFR = 45-59 mL/min/1 73 square meters)    Stage 3B Moderate CKD (GFR = 30-44 mL/min/1 73 square meters)    Stage 4 Severe CKD (GFR = 15-29 mL/min/1 73 square meters)    Stage 5 End Stage CKD (GFR <15 mL/min/1 73 square meters)  Note: GFR calculation is accurate only with a steady state creatinine    Lipase [[id number]]  (Normal) Collected: 07/18/22 2340    Lab Status: Final result Specimen: Blood Updated: 07/19/22 0025     Lipase 51 u/L     CK Total with Reflex CKMB [[id number]]  (Normal) Collected: 07/18/22 2340    Lab Status: Final result Specimen: Blood Updated: 07/19/22 0025     Total  U/L     CKMB [[id number]] Collected: 07/18/22 2340    Lab Status: In process Specimen: Blood Updated: 07/19/22 0025    Ammonia [[id number]]  (Abnormal) Collected: 07/18/22 2339    Lab Status: Final result Specimen: Blood Updated: 07/19/22 0024     Ammonia 155 umol/L     HS Troponin 0hr (reflex protocol) [[id number]]  (Abnormal) Collected: 07/18/22 2340    Lab Status: Final result Specimen: Blood Updated: 07/19/22 0019     hs TnI 0hr 57 ng/L     HS Troponin I 2hr [[id number]]     Lab Status: No result Specimen: Blood     HS Troponin I 4hr [[id number]]     Lab Status: No result Specimen: Blood     Blood culture #2 [[id number]] Collected: 07/18/22 2343    Lab Status:  In process Specimen: Blood from Arm, Right Updated: 07/19/22 0019    B-Type Natriuretic Peptide(BNP) AN, CA, EA Campuses Only [[id number]]  (Abnormal) Collected: 07/18/22 2340    Lab Status: Final result Specimen: Blood Updated: 07/19/22 0018     BNP 1,529 pg/mL     CBC and differential [[id number]]  (Abnormal) Collected: 07/18/22 2340    Lab Status: Final result Specimen: Blood Updated: 07/19/22 0006     WBC 5 89 Thousand/uL      RBC 2 33 Million/uL      Hemoglobin 7 7 g/dL      Hematocrit 23 8 %       fL      MCH 33 0 pg      MCHC 32 4 g/dL      RDW 17 1 %      MPV 11 4 fL      Platelets 92 Thousands/uL      nRBC 0 /100 WBCs      Neutrophils Relative 55 %      Immat GRANS % 1 %      Lymphocytes Relative 15 %      Monocytes Relative 26 %      Eosinophils Relative 3 %      Basophils Relative 0 %      Neutrophils Absolute 3 26 Thousands/µL      Immature Grans Absolute 0 05 Thousand/uL      Lymphocytes Absolute 0 87 Thousands/µL      Monocytes Absolute 1 54 Thousand/µL      Eosinophils Absolute 0 15 Thousand/µL      Basophils Absolute 0 02 Thousands/µL     Blood gas, venous [[id number]]  (Abnormal) Collected: 07/18/22 2339    Lab Status: Final result Specimen: Blood Updated: 07/18/22 2343     pH, Romero 7 482     pCO2, Romero 41 1 mm Hg      pO2, Romero 37 5 mm Hg      HCO3, Romero 30 1 mmol/L      Base Excess, Romero 6 1 mmol/L      O2 Content, Romero 8 4 ml/dL      O2 HGB, VENOUS 68 8 %     Blood culture #1 [[id number]] Collected: 07/18/22 2341    Lab Status: In process Specimen: Blood Updated: 07/18/22 2341                 XR chest 1 view portable   ED Interpretation by Kwan Mc MD (07/18 2342)   Cardiomegaly, increased vascular markings and no infiltrate or PTX read by me                 Procedures  ECG 12 Lead Documentation Only    Date/Time: 7/19/2022 12:04 AM  Performed by: Kwan Mc MD  Authorized by: Kwan Mc MD     Indications / Diagnosis:  Chest pain, sob  ECG reviewed by me, the ED Provider: yes    Patient location:  ED  Quality:     Tracing quality:  Limited by artifact  Rate:     ECG rate:  86    ECG rate assessment: normal    Rhythm:     Rhythm: atrial fibrillation    Ectopy:     Ectopy: none    QRS:     QRS axis:  Normal  ST segments:     ST segments:  Normal  T waves:     T waves: non-specific    Other findings:     Other findings: prolonged qTc interval               ED Course  ED Course as of 07/19/22 0047   Tue Jul 19, 2022   0021 BNP(!): 1,529  Lasix not given since patient is on dialysis and      0022 hs TnI 0hr(!): 57  NTG not given since      0034 Labs and CXR d/w patient and daughter with patient's permission      0038 LACTIC ACID(!!): 2 8  Thiamine ordered  IVFs not given since patient has CHF and is on dialysis  No evidence of pneumonia  0047 Pulse ox decreased to 91% on RA so nasal cannula oxygen ordered  HEART Risk Score    Flowsheet Row Most Recent Value   Heart Score Risk Calculator    History 2 Filed at: 07/19/2022 0033   ECG 1 Filed at: 07/19/2022 0033   Age 2 Filed at: 07/19/2022 0033   Risk Factors 2 Filed at: 07/19/2022 0033   Troponin 2 Filed at: 07/19/2022 0033   HEART Score 9 Filed at: 07/19/2022 0033                        SBIRT 22yo+    Flowsheet Row Most Recent Value   SBIRT (23 yo +)    In order to provide better care to our patients, we are screening all of our patients for alcohol and drug use  Would it be okay to ask you these screening questions? Yes Filed at: 07/18/2022 2319   Initial Alcohol Screen: US AUDIT-C     1  How often do you have a drink containing alcohol? 0 Filed at: 07/18/2022 2319   2  How many drinks containing alcohol do you have on a typical day you are drinking? 0 Filed at: 07/18/2022 2319   3a  Male UNDER 65: How often do you have five or more drinks on one occasion? 0 Filed at: 07/18/2022 2319   3b  FEMALE Any Age, or MALE 65+: How often do you have 4 or more drinks on one occassion? 0 Filed at: 07/18/2022 2319   Audit-C Score 0 Filed at: 07/18/2022 2319   AFTAB: How many times in the past year have you    Used an illegal drug or used a prescription medication for non-medical reasons?  Never Filed at: 07/18/2022 2319        BONNIE Risk Score    Flowsheet Row Most Recent Value   Age >= 72 1 Filed at: 07/19/2022 0033   Known CAD (stenosis >= 50%) 0 Filed at: 07/19/2022 0033   Recent (<=24 hrs) Service Angina 0 Filed at: 07/19/2022 0033   ST Deviation >= 0 5 mm 0 Filed at: 07/19/2022 0033   3+ CAD Risk Factors (FHx, HTN, HLP, DM, Smoker) 1 Filed at: 07/19/2022 0033   Aspirin Use Past 7 Days 0 Filed at: 07/19/2022 0033   Elevated Cardiac Markers 1 Filed at: 07/19/2022 0033   BONNIE Risk Score (Calculated) 3 Filed at: 07/19/2022 0033                  MDM  Number of Diagnoses or Management Options  Diagnosis management comments: DDX including but not limited to: pneumonia, pleural effusion, CHF, doubt PE since patient is on eliquis; PTX, ACS, MI, asthma exacerbation, COPD exacerbation, COVID 19, EVALI, anemia, metabolic abnormality, ESRD on hemodialysis, rhabdomyolysis; doubt dissection  Amount and/or Complexity of Data Reviewed  Clinical lab tests: ordered and reviewed  Tests in the radiology section of CPT®: ordered and reviewed  Decide to obtain previous medical records or to obtain history from someone other than the patient: yes  Obtain history from someone other than the patient: yes  Review and summarize past medical records: yes  Discuss the patient with other providers: yes  Independent visualization of images, tracings, or specimens: yes        Disposition  Final diagnoses:   Chest pain   Elevated troponin   CHF (congestive heart failure) ([facility] )   Hyperammonemia (HCC)   Elevated lactic acid level   Hypoxia     Time reflects when diagnosis was documented in both MDM as applicable and the Disposition within this note     Time User Action Codes Description Comment    7/19/2022 12:34 AM Tanesha Frizzle Add [R07 9] Chest pain     7/19/2022 12:34 AM Tanesha Frizzle Add [R77 8] Elevated troponin     7/19/2022 12:34 AM Tanesha Frizzle Add [I50 9] CHF (congestive heart failure) ([facility] )     7/19/2022 12:34 AM Tanesha Frizzle Add [E72 20] Hyperammonemia ([facility] )     7/19/2022 12:38 AM Tanesha Frizzle Add [R79 89] Elevated lactic acid level     7/19/2022 12:47 AM Tanesha Frizzle Add [R09 02] Hypoxia       ED Disposition     ED Disposition   Admit    Condition   Stable    Date/Time   Tue Jul 19, 2022 12:46 AM    Comment   Case was discussed with [facility] resident and the patient's admission status was agreed to be Admission Status: inpatient status to the service of Dr Perry Alston   Follow-up Information    None         Patient's Medications   Discharge Prescriptions    No medications on file       No discharge procedures on file      PDMP Review       Value Time User    PDMP Reviewed  Yes 7/18/2022 11:16 PM Maile Phoenix, MD          ED Provider  Electronically Signed by           Maile Phoenix, MD  07/19/22 0599

## 2022-07-19 NOTE — CONSULTS
[address] [facility], located within [facility] [de-identified] y o  male MRN: [id number]  Unit/Bed#: ED 06 Encounter: [id number]    ASSESSMENT and PLAN:    51-year-old male with a history of atrial fibrillation, chronic hypotension, ESRD on hemodialysis, cirrhosis who presented for chest pain and shortness of breath  Admitted for empiric pneumonia management  Nephrology consult for ESRD management  End Stage Kidney Disease  -Modality:In-Center Hemodialysis  -Schedule: Monday/Wednesday/Friday  -Dialysis Unit: [facility]  -Access: Left arm AV graft  -Presciption:  Reviewed  Dry weight 81 5 kg  Receives midodrine 10 mg prior to dialysis and another 10 mg during dialysis  -Status:  Underwent hemodialysis yesterday, completed full treatment  No indications for renal placement therapy today  -Plan:   · Plan for hemodialysis tomorrow    Anemia of chronic kidney disease  --continue Epogen with dialysis  --patient also receives IV iron, will hold at this time due to active infectious concerns  --hemoglobin below target    Chest pain/shortness of breath  --being empirically treated for possible pneumonia  --viral plan on including influenza and COVID were negative  --clinically feeling better  --no ST elevations on EKG      Lactic acidosis  --mild 2 5  --met SIRS criteria  --received a dose of ceftriaxone    Hepatic encephalopathy  --elevated ammonia level    Cirrhosis  --tentatively plan for paracentesis    Mineral bone disorder-chronic kidney disease  --continue Hectorol with dialysis    Chronic hypotension  --midodrine 10 mg t i d , receives 10 mg prior to dialysis and then sometimes during dialysis as well        SUMMARY OF RECOMMENDATIONS:    Please see above    HISTORY OF PRESENT ILLNESS:  Requesting Physician: Jahaira Ambriz DO  Reason for Consult:  ESRD    Jaimie Robertson is a [de-identified] y o  male who was admitted to [facility] after presenting with chest pain radiating to his left arms shortness of breath  A renal consultation is requested today for assistance in the management of ESRD  Patient underwent hemodialysis yesterday and underwent his full treatment  Patient was also scheduled for paracentesis today  He has a history of cirrhosis, atrial fibrillation and intra dialytic hypotension and chronic hypotension for which he is maintained on midodrine  If presented for chest pain radiating to his left arm with associated shortness of breath  It also reported some abdominal pain and diarrhea  He is feeling better at this time this morning  He had met sepsis criteria assessed x-ray found what looked to be an infiltrate and was started on antibiotics for empiric pneumonia management      PAST MEDICAL HISTORY:  Past Medical History:   Diagnosis Date    Acute renal failure superimposed on stage 4 chronic kidney disease ([place] [id number] ) 10/4/2020    Arthritis     BPH without urinary obstruction     CKD (chronic kidney disease), stage III (HCC)     baseline 1 6-1 7    Dialysis patient ([place] [address] )     GERD (gastroesophageal reflux disease)     Hyperlipidemia     Hypertension     MI (myocardial infarction) ([place] [address] )        PAST SURGICAL HISTORY:  Past Surgical History:   Procedure Laterality Date    APPENDECTOMY      CARDIAC SURGERY      COLONOSCOPY  2017    FRACTURE SURGERY      IR AV FISTULA/GRAFT DECLOT  9/3/2021    IR AV FISTULA/GRAFT DECLOT  9/30/2021    IR AV FISTULA/GRAFT DECLOT  1/7/2022    IR PERITONEAL DIALYSIS CATHETER PLACEMENT  2/3/2021    IR PERITONEAL DIALYSIS CATHETER REMOVAL  2/17/2021    IR PERITONEAL DIALYSIS CATHETER REMOVAL AND PLACEMENT NEW SITE  2/9/2021    IR TEMPORARY DIALYSIS CATHETER PLACEMENT  12/23/2019    IR THORACENTESIS  12/24/2019    IR THORACENTESIS  12/27/2019    IR TUNNELED CENTRAL LINE REMOVAL  4/27/2021    IR TUNNELED DIALYSIS CATHETER CHECK/CHANGE/REPOSITION/ANGIOPLASTY  1/6/2020    IR TUNNELED DIALYSIS CATHETER PLACEMENT  1/2/2020    IR TUNNELED DIALYSIS CATHETER PLACEMENT  2/15/2021    IR TUNNELED DIALYSIS CATHETER REMOVAL  3/4/2020    KY ANASTOMOSIS,AV,ANY SITE Left 3/26/2021    Procedure: LEFT FOREARM LOOP AV GRAFT;  Surgeon: Dara Bautista MD;  Location: [facility];  Service: Vascular    KY ASCEND AORTA GRAFT W ROOT REPLACMENT  VALVE CONDUIT/CORON RECONSTRUCT N/A 12/15/2019    Procedure: BENTALL PROCEDURE (ASCENDING AORTIC REPAIR) with 26mm Gelweave Graft;  Surgeon: Marietta Albert DO;  Location: [facility];  Service: Cardiac Surgery    KY RECONSTR TOTAL SHOULDER IMPLANT Right 12/5/2017    Procedure: ARTHROPLASTY SHOULDER REVERSE with OPEN CYST EXCISION;  Surgeon: Emmanuel Alvarado MD;  Location: [facility];  Service: Orthopedics    SHOULDER SURGERY      WRIST SURGERY         ALLERGIES:  No Known Allergies    SOCIAL HISTORY:  Social History     Substance and Sexual Activity   Alcohol Use Not Currently     Social History     Substance and Sexual Activity   Drug Use Not Currently     Social History     Tobacco Use   Smoking Status Never Smoker   Smokeless Tobacco Never Used       FAMILY HISTORY:  Family History   Problem Relation Age of Onset    No Known Problems Mother     Hypertension Father     Arthritis Family     No Known Problems Daughter        MEDICATIONS:    Current Facility-Administered Medications:     apixaban (ELIQUIS) tablet 2 5 mg, 2 5 mg, Oral, BID, An Whiting DO    atorvastatin (LIPITOR) tablet 40 mg, 40 mg, Oral, Daily, Adis Guerrero MD    clotrimazole (LOTRIMIN) 1 % cream, , Topical, BID, Adis Guerrero MD    docusate sodium (COLACE) capsule 100 mg, 100 mg, Oral, BID, Adis Guerrero MD    doxercalciferol (HECTOROL) injection 2 5 mcg, 2 5 mcg, Intravenous, After Dialysis, Mick Little MD    epoetin uday (EPOGEN,PROCRIT) injection 4,000 Units, 4,000 Units, Intravenous, After Dialysis, Mick Little MD    gabapentin (NEURONTIN) capsule 100 mg, 100 mg, Oral, HS, Adis Guerrero MD    heparin (porcine) injection 1,000 Units, 1,000 Units, Intravenous, Once PRN, Ministerio Bishop MD    lactulose oral solution 20 g, 20 g, Oral, Daily, Cecilio Frost MD    metoprolol succinate (TOPROL-XL) 24 hr tablet 12 5 mg, 12 5 mg, Oral, BID, Cecilio Frost MD    midodrine (PROAMATINE) tablet 10 mg, 10 mg, Oral, TID AC, Cecilio Frost MD, 10 mg at 07/19/22 0745    midodrine (PROAMATINE) tablet 10 mg, 10 mg, Oral, Before Dialysis, Ministerio Bishop MD    oxyCODONE (ROXICODONE) IR tablet 2 5 mg, 2 5 mg, Oral, TID PRN, Cecilio Frost MD, 2 5 mg at 07/19/22 0348    pantoprazole (PROTONIX) EC tablet 40 mg, 40 mg, Oral, Early Morning, Cecilio Frost MD, 40 mg at 07/19/22 7043    trimethobenzamide (TIGAN) IM injection 200 mg, 200 mg, Intramuscular, Q6H PRN, Carol Bro DO    Current Outpatient Medications:     apixaban (Eliquis) 2 5 mg, Take 1 tablet (2 5 mg total) by mouth 2 (two) times a day, Disp: 180 tablet, Rfl: 3    atorvastatin (LIPITOR) 40 mg tablet, TAKE 1 TABLET (40 MG TOTAL) BY MOUTH DAILY, Disp: 90 tablet, Rfl: 0    docusate sodium (COLACE) 100 mg capsule, Take 1 capsule (100 mg total) by mouth in the morning and 1 capsule (100 mg total) in the evening , Disp: 180 capsule, Rfl: 0    doxercalciferol (HECTOROL) 4 mcg/2 mL, Infuse 1 5 mL (3 mcg total) into a venous catheter After Dialysis (with dialysis), Disp: 2 mL, Rfl: 0    epoetin uday (EPOGEN,PROCRIT) 4,000 units/mL, Inject 0 6 mL (2,400 Units total) under the skin After Dialysis (anemia), Disp: , Rfl: 0    gabapentin (NEURONTIN) 100 mg capsule, TAKE 1 CAPSULE BY MOUTH DAILY IN THE EVENING, Disp: 30 capsule, Rfl: 5    iron sucrose 50 mg in sodium chloride 0 9 % 100 mL IVPB, Infuse 50 mg into a venous catheter once a week, Disp: , Rfl: 0    ketoconazole (NIZORAL) 2 % cream, APPLY TOPICALLY DAILY, Disp: 30 g, Rfl: 2    lactulose 20 g/30 mL, Take 30 mL (20 g total) by mouth daily, Disp: 900 mL, Rfl: 0    lidocaine (LIDODERM) 5 %, Apply 1 patch topically daily Remove & Discard patch within 12 hours or as directed by MD, Disp: 30 patch, Rfl: 0    lidocaine-prilocaine (EMLA) cream, Apply topically as needed for mild pain (with HD sessions) With dialysis sessions, Disp: 30 g, Rfl: 3    metoprolol succinate (TOPROL-XL) 25 mg 24 hr tablet, Take 0 5 tablets (12 5 mg total) by mouth 2 (two) times a day, Disp: 60 tablet, Rfl: 0    midodrine (PROAMATINE) 10 MG tablet, TAKE 1 TABLET (10 MG TOTAL) BY MOUTH 3 (THREE) TIMES A DAY BEFORE MEALS, Disp: 90 tablet, Rfl: 0    nystatin (MYCOSTATIN) powder, Apply topically 3 (three) times a day as needed (rash) (Patient not taking: No sig reported), Disp: 45 g, Rfl: 1    oxyCODONE (ROXICODONE) 5 immediate release tablet, Take 0 5 tablets (2 5 mg total) by mouth 3 (three) times a day as needed for severe pain Max Daily Amount: 7 5 mg, Disp: 30 tablet, Rfl: 0    pantoprazole (PROTONIX) 40 mg tablet, Take 1 tablet (40 mg total) by mouth daily in the early morning, Disp: 30 tablet, Rfl: 2    REVIEW OF SYSTEMS:  Constitutional: Negative for fatigue, anorexia, fever, chills, diaphoresis  HENT: Negative for postnasal drip  Eyes: Negative for visual disturbance  Respiratory:  Positive cough and shortness of breath  Cardiovascular: Negative for chest pain, palpitations and leg swelling  Gastrointestinal: Negative for abdominal pain, constipation, diarrhea, nausea and vomiting  Genitourinary: No dysuria, hematuria  Endocrine: Negative for polyuria  Musculoskeletal: Negative for arthralgias, back pain and joint swelling  Skin: Negative for rash  Neurological: Negative for focal weakness, headaches, dizziness  Hematological: Negative for easy bruising or bleeding  Psychiatric/Behavioral: Negative for confusion and sleep disturbance  All the systems were reviewed and were negative except as documented on the HPI      PHYSICAL EXAM:  Current Weight: Weight - Scale: 87 kg (191 lb 12 8 oz)  First Weight: Weight - Scale: 87 kg (191 lb 12 8 oz)  Vitals: 07/19/22 0354 07/19/22 0600 07/19/22 0700 07/19/22 0745   BP: (!) 85/51 (!) 87/53 91/55 98/55   BP Location: Right arm Right arm Right arm Right arm   Pulse: 86 79 82 78   Resp: (!) 93 18 16 16   Temp:       TempSrc:       SpO2: 94% 93% 92% 98%   Weight:       Height:           Intake/Output Summary (Last 24 hours) at 7/19/2022 0844  Last data filed at 7/19/2022 0214  Gross per 24 hour   Intake 50 ml   Output --   Net 50 ml     Physical Exam  Vitals and nursing note reviewed  Constitutional:       General: He is not in acute distress  Appearance: He is well-developed  HENT:      Head: Normocephalic and atraumatic  Eyes:      General: No scleral icterus  Conjunctiva/sclera: Conjunctivae normal       Pupils: Pupils are equal, round, and reactive to light  Cardiovascular:      Rate and Rhythm: Normal rate and regular rhythm  Heart sounds: S1 normal and S2 normal  No murmur heard  No friction rub  No gallop  Pulmonary:      Effort: Pulmonary effort is normal  No respiratory distress  Breath sounds: Normal breath sounds  No wheezing or rales  Abdominal:      General: Bowel sounds are normal       Palpations: Abdomen is soft  Tenderness: There is no abdominal tenderness  There is no rebound  Musculoskeletal:         General: Normal range of motion  Cervical back: Normal range of motion and neck supple  Right lower leg: Edema present  Left lower leg: Edema present  Skin:     Findings: No rash  Neurological:      Mental Status: He is alert and oriented to person, place, and time     Psychiatric:         Behavior: Behavior normal            Invasive Devices:      Lab Results:   Results from last 7 days   Lab Units 07/19/22  0748 07/18/22  2340 07/18/22  1051   WBC Thousand/uL 5 59 5 89 5 59   HEMOGLOBIN g/dL 7 4* 7 7* 7 9*   HEMATOCRIT % 22 5* 23 8* 24 8*   PLATELETS Thousands/uL 78* 92* 98*   POTASSIUM mmol/L  --  3 5  --    CHLORIDE mmol/L  --  100  --    CO2 mmol/L --  25  --    BUN mg/dL  --  25  --    CREATININE mg/dL  --  4 40*  --    CALCIUM mg/dL  --  7 7*  --    ALK PHOS U/L  --  136*  --    ALT U/L  --  49  --    AST U/L  --  122*  --

## 2022-07-19 NOTE — QUICK NOTE
This is an 42-year-old male with a medical history significant for prior thoracic aortic dissection, paroxysmal atrial fibrillation, ESRD with dialysis Monday Wednesday Friday, CHF, LOPEZ cirrhosis, HLD, hypotension supported by midodrine who presented to the hospital with acute onset of left-sided chest pain and shortness  On ED evaluation, patient was hypotensive, afebrile  Lab work revealed elevated lactic acid 2 5  Mild hyponatremia  Mildly elevated alkaline phosphatase and total bilirubin  EKG showed atrial fibrillation with no ST elevations, appears similar to prior  COVID/flu/RSV negative  Ammonia elevated at 155  Mild troponin elevation 57/  Chest x-ray showed some cardiomegaly and possible small right pleural effusion  BNP elevated at 1529  On evaluation, patient was mildly nauseous  He no longer has any chest pain or shortness of breath  Updated daughter at bedside    Plan:  · Order IR paracentesis, fluid analysis studies, albumin replacement per protocol  · Nephrology consulted, plan for hemodialysis tomorrow  · Continue lactulose with goal of 3-4 bowel movements per day  · Unable to give metoprolol this morning  Gave albumin  If unable to give afternoon dose of metoprolol due to low blood pressures, could consider another trial of albumin  · Blood pressures at baseline seem to be soft at 61Z to 90 systolic  · If blood pressures become off later today, would recommend avoiding further fluids  Would recommend giving albumin  · Monitor off of antibiotics  · Continue Eliquis 2 5 mg b i d   · QTC prolonged, continue Tigan p r n   For nausea and vomiting

## 2022-07-19 NOTE — H&P
Heydi 84 [address] [facility] 1940, [de-identified] y o  male MRN: [id number]  Unit/Bed#: ED 06 Encounter: [id number]  Primary Care Provider: EL Lane   Date and time admitted to hospital: 7/18/2022 11:12 PM    * Hypertensive heart disease with heart failure [facility])  Assessment & Plan  Wt Readings from Last 3 Encounters:   07/18/22 87 kg (191 lb 12 8 oz)   07/12/22 87 2 kg (192 lb 3 9 oz)   06/07/22 87 3 kg (192 lb 8 oz)     Patient had a transient episode of chest pain with SOB lasting approximately 10 minutes around 5:00 p m  On 07/18  BNP 1529  No significant increase in body weight  No hepatojugular reflex observed  Currently holding diuresis with soft Bps    Will continue to monitor and follow      ESRD (end stage renal disease) on dialysis [facility])  Assessment & Plan  Lab Results   Component Value Date    EGFR 11 07/18/2022    EGFR 5 06/04/2022    EGFR 4 06/03/2022    CREATININE 4 40 (H) 07/18/2022    CREATININE 7 87 (H) 06/04/2022    CREATININE 9 17 (H) 06/03/2022       Last dialysis session 7/18/22  Patient on tri weekly schedule of Monday Wednesday and Friday    Pneumonia due to infectious organism  Assessment & Plan  Bedside examination is somewhat limited  However,  In addition to cardiomegaly, CXR shows increased bronchovascular markings, and to my read suspicion for right lower lobe pleural effusion and possible consolidation  Patient meets SIRS criteria  Elevated lactate and procalcitonin  Ceftriaxone ordered    Acute hepatic encephalopathy  Assessment & Plan  Ammonia elevated at 155  Patient states he has been extremely fatigued  Continue to monitor and follow    Lactulose 20 g reordered with goal of 3 BM per day    Atrial fibrillation [facility])  Assessment & Plan  Admission EKG demonstrates AFib, nonspecific T-wave abnormalities    Continue Eliquis 2 5 b i d  Continue metoprolol XL 12 5 b i d      Chest pain at rest  Assessment & Plan  Patient reports episode of chest pain, with left arm pain and SOB lasting approximately 10 minutes occurred 5:00 p m  On 07/18  Patient is dated symptoms have not recurred since  Troponin pending  No ST elevation on EKG  On Telemetry  Continue to monitor and follow                VTE Prophylaxis: Apixaban (Eliquis)  / sequential compression device   Code Status:  Level 3 DNR DNI confirmed with patient x2  POLST: POLST form is not discussed and not completed at this time  Anticipated Length of Stay:  Patient will be admitted on an Inpatient basis with an anticipated length of stay of more than 2 midnights  Justification for Hospital Stay: Medical care and management    Chief Complaint:  Chest pain radiating to left arm with shortness of breath    History of Present Illness:    Sofi Mcmullen is a [de-identified] y o  male PMH significant for cirrhosis, AFib, ESRD, who presents with an episode of chest pain, radiating to the left arm shortness of breath  The patient states this lasted only 10 minutes and has not recurred  The patient also is complaining of extreme fatigue, and cough  The patient also reports generalized abdominal pain and tenderness with associated "fluid", presumably associated with underlying hepatic cirrhosis The patient stated he is also scheduled to undergo paracentesis today  The patient was examined at the bedside where he was pleasant however obviously extremely fatigued and uncomfortable  Patient was placed as comfortably as possible in warm blankets  Verified patient's code status x2 as level 3 DNR DNI  Patient has no other questions or concerns at this time  Review of Systems:    Review of Systems   Constitutional: Positive for fatigue  Negative for chills, diaphoresis and fever  Respiratory: Positive for cough  Negative for shortness of breath (None at present)  Cardiovascular: Positive for leg swelling  Negative for chest pain (None at present)  Gastrointestinal: Positive for abdominal pain and diarrhea   Negative for nausea and vomiting  Neurological: Negative for light-headedness and headaches  Past Medical and Surgical History:     Past Medical History:   Diagnosis Date    Acute renal failure superimposed on stage 4 chronic kidney disease ([facility] ) 10/4/2020    Arthritis     BPH without urinary obstruction     CKD (chronic kidney disease), stage III (HCC)     baseline 1 6-1 7    Dialysis patient ([place] [address] )     GERD (gastroesophageal reflux disease)     Hyperlipidemia     Hypertension     MI (myocardial infarction) ([place] [address] )        Past Surgical History:   Procedure Laterality Date    APPENDECTOMY      CARDIAC SURGERY      COLONOSCOPY  2017    FRACTURE SURGERY      IR AV FISTULA/GRAFT DECLOT  9/3/2021    IR AV FISTULA/GRAFT DECLOT  9/30/2021    IR AV FISTULA/GRAFT DECLOT  1/7/2022    IR PERITONEAL DIALYSIS CATHETER PLACEMENT  2/3/2021    IR PERITONEAL DIALYSIS CATHETER REMOVAL  2/17/2021    IR PERITONEAL DIALYSIS CATHETER REMOVAL AND PLACEMENT NEW SITE  2/9/2021    IR TEMPORARY DIALYSIS CATHETER PLACEMENT  12/23/2019    IR THORACENTESIS  12/24/2019    IR THORACENTESIS  12/27/2019    IR TUNNELED CENTRAL LINE REMOVAL  4/27/2021    IR TUNNELED DIALYSIS CATHETER CHECK/CHANGE/REPOSITION/ANGIOPLASTY  1/6/2020    IR TUNNELED DIALYSIS CATHETER PLACEMENT  1/2/2020    IR TUNNELED DIALYSIS CATHETER PLACEMENT  2/15/2021    IR TUNNELED DIALYSIS CATHETER REMOVAL  3/4/2020    WI ANASTOMOSIS,AV,ANY SITE Left 3/26/2021    Procedure: LEFT FOREARM LOOP AV GRAFT;  Surgeon: Jose Wyatt MD;  [facility]: [facility];  Service: Vascular    WI ASCEND AORTA GRAFT W ROOT REPLACMENT  VALVE CONDUIT/CORON RECONSTRUCT N/A 12/15/2019    Procedure: BENTALL PROCEDURE (ASCENDING AORTIC REPAIR) with 26mm Gelweave Graft;  Surgeon: Sonya Osborne DO;  [facility];  Service: Cardiac Surgery    WI RECONSTR TOTAL SHOULDER IMPLANT Right 12/5/2017    Procedure: ARTHROPLASTY SHOULDER REVERSE with OPEN CYST EXCISION;  Surgeon: Pura Curtis Trinidad Simeon MD;  Location[facility];  Service: Orthopedics    SHOULDER SURGERY      WRIST SURGERY         Meds/Allergies:    Prior to Admission medications    Medication Sig Start Date End Date Taking?  Authorizing Provider   apixaban (Eliquis) 2 5 mg Take 1 tablet (2 5 mg total) by mouth 2 (two) times a day 2/9/22   Jim Herrera MD   atorvastatin (LIPITOR) 40 mg tablet TAKE 1 TABLET (40 MG TOTAL) BY MOUTH DAILY 6/28/22   Jim Herrera MD   docusate sodium (COLACE) 100 mg capsule Take 1 capsule (100 mg total) by mouth in the morning and 1 capsule (100 mg total) in the evening  5/11/22 8/9/22  Srinivas Eli MD   doxercalciferol (HECTOROL) 4 mcg/2 mL Infuse 1 5 mL (3 mcg total) into a venous catheter After Dialysis (with dialysis) 6/4/22   Pat Conde MD   epoetin uday (EPOGEN,PROCRIT) 4,000 units/mL Inject 0 6 mL (2,400 Units total) under the skin After Dialysis (anemia) 6/4/22   Pat Conde MD   gabapentin (NEURONTIN) 100 mg capsule TAKE 1 CAPSULE BY MOUTH DAILY IN THE EVENING 6/2/22   Shira Mckeon MD   iron sucrose 50 mg in sodium chloride 0 9 % 100 mL IVPB Infuse 50 mg into a venous catheter once a week 6/10/22   Pat Conde MD   ketoconazole (NIZORAL) 2 % cream APPLY TOPICALLY DAILY 5/30/22   EL Rendon   lactulose 20 g/30 mL Take 30 mL (20 g total) by mouth daily 6/5/22 7/5/22  Pat Conde MD   lidocaine (LIDODERM) 5 % Apply 1 patch topically daily Remove & Discard patch within 12 hours or as directed by MD 12/23/21   Amy Hawkins MD   lidocaine-prilocaine (EMLA) cream Apply topically as needed for mild pain (with HD sessions) With dialysis sessions 5/24/22   EL Rendon   metoprolol succinate (TOPROL-XL) 25 mg 24 hr tablet Take 0 5 tablets (12 5 mg total) by mouth 2 (two) times a day 7/14/22   Mahsa L Brown, CRNP   midodrine (PROAMATINE) 10 MG tablet TAKE 1 TABLET (10 MG TOTAL) BY MOUTH 3 (THREE) TIMES A DAY BEFORE MEALS 6/29/22 9/24/14  Jv Holley MD   nystatin (MYCOSTATIN) powder Apply topically 3 (three) times a day as needed (rash)  Patient not taking: No sig reported 2/8/22   EL Mendoza   oxyCODONE (ROXICODONE) 5 immediate release tablet Take 0 5 tablets (2 5 mg total) by mouth 3 (three) times a day as needed for severe pain Max Daily Amount: 7 5 mg 7/12/22   Valarie Kemp,    pantoprazole (PROTONIX) 40 mg tablet Take 1 tablet (40 mg total) by mouth daily in the early morning 5/24/22   EL Mendoza     I have reviewed home medications with patient personally  Allergies: No Known Allergies    Social History:     Marital Status: /Civil Union   Occupation:  Retired  Patient Pre-hospital Living Situation:  With family  Patient Pre-hospital Level of Mobility:  Limited  Patient Pre-hospital Diet Restrictions:  Low-sodium  Substance Use History:  None  Social History     Substance and Sexual Activity   Alcohol Use Not Currently     Social History     Tobacco Use   Smoking Status Never Smoker   Smokeless Tobacco Never Used     Social History     Substance and Sexual Activity   Drug Use Not Currently       Family History:    Family History   Problem Relation Age of Onset    No Known Problems Mother     Hypertension Father     Arthritis Family     No Known Problems Daughter        Physical Exam:     Vitals:   Blood Pressure: (!) 85/53 (07/19/22 0211)  Pulse: 82 (07/19/22 0211)  Temperature: 98 6 °F (37 °C) (07/18/22 2316)  Temp Source: Oral (07/18/22 2316)  Respirations: 20 (07/19/22 0124)  Height: 5' 7" (170 2 cm) (07/18/22 2316)  Weight - Scale: 87 kg (191 lb 12 8 oz) (07/18/22 2316)  SpO2: 91 % (07/19/22 0124)    Physical Exam  Constitutional:       Appearance: He is ill-appearing  HENT:      Head: Normocephalic  Nose: No rhinorrhea  Eyes:      Extraocular Movements: Extraocular movements intact  Cardiovascular:      Rate and Rhythm: Normal rate and regular rhythm  Heart sounds: Murmur (I/VI systolic) heard       No friction rub       Comments: No hepatojugular reflux observed  Pulmonary:      Effort: No respiratory distress  Breath sounds: No wheezing or rales  Comments: Generally diminished breath sounds  Abdominal:      Palpations: Abdomen is soft  Tenderness: There is abdominal tenderness (generalized)  There is no guarding  Comments: Bedside abdominal examination limited at this time   Musculoskeletal:      Right lower leg: Edema (1+ pitting) present  Left lower leg: Edema (1+ pitting) present  Skin:     General: Skin is warm and dry  Neurological:      General: No focal deficit present  Psychiatric:         Thought Content: Thought content normal              Additional Data:     Lab Results: I have personally reviewed pertinent reports  Results from last 7 days   Lab Units 07/18/22  2340   WBC Thousand/uL 5 89   HEMOGLOBIN g/dL 7 7*   HEMATOCRIT % 23 8*   PLATELETS Thousands/uL 92*   NEUTROS PCT % 55   LYMPHS PCT % 15   MONOS PCT % 26*   EOS PCT % 3     Results from last 7 days   Lab Units 07/18/22  2340   POTASSIUM mmol/L 3 5   CHLORIDE mmol/L 100   CO2 mmol/L 25   BUN mg/dL 25   CREATININE mg/dL 4 40*   CALCIUM mg/dL 7 7*   ALK PHOS U/L 136*   ALT U/L 49   AST U/L 122*     Results from last 7 days   Lab Units 07/18/22  2340   INR  2 23*       Imaging: I have personally reviewed pertinent reports  No results found  EKG, Pathology, and Other Studies Reviewed on Admission:   · EKG:  AFib, nonspecific T-wave abnormalities    Epic / Care Everywhere Records Reviewed: No     ** Please Note: This note has been constructed using a voice recognition system   **

## 2022-07-19 NOTE — ASSESSMENT & PLAN NOTE
Patient reports episode of chest pain, with left arm pain and SOB lasting approximately 10 minutes occurred 5:00 p m  On 07/18  Patient is dated symptoms have not recurred since      Troponin pending  No ST elevation on EKG  On Telemetry  Continue to monitor and follow

## 2022-07-19 NOTE — ASSESSMENT & PLAN NOTE
Lab Results   Component Value Date    EGFR 11 07/18/2022    EGFR 5 06/04/2022    EGFR 4 06/03/2022    CREATININE 4 40 (H) 07/18/2022    CREATININE 7 87 (H) 06/04/2022    CREATININE 9 17 (H) 06/03/2022       Last dialysis session 7/18/22  Patient on tri weekly schedule of Monday Wednesday and Friday

## 2022-07-19 NOTE — ED NOTES
Patient is nauseous at this time, will inform RN   Patient declines breakfast     Ruy Mae  07/19/22 3314

## 2022-07-19 NOTE — ASSESSMENT & PLAN NOTE
Wt Readings from Last 3 Encounters:   07/18/22 87 kg (191 lb 12 8 oz)   07/12/22 87 2 kg (192 lb 3 9 oz)   06/07/22 87 3 kg (192 lb 8 oz)         *

## 2022-07-19 NOTE — QUICK NOTE
Patient was checked at the bedside this morning where he was much more comfortable  States no recurrent chest pain  On physical examination lower extremity edema was improved       Jatin Celis MD PGY-3 FM

## 2022-07-20 NOTE — DISCHARGE INSTRUCTIONS
Abdominal Paracentesis     WHAT YOU NEED TO KNOW:   Abdominal paracentesis is a procedure to remove abnormal fluid buildup in your abdomen  Fluid builds up because of liver problems, such as swelling and scarring  Heart failure, kidney disease, a mass, or problems with your pancreas may also cause fluid buildup  DISCHARGE INSTRUCTIONS:     Follow up with your healthcare provider as directed: Write down your questions so you remember to ask them during your visits  Wound care: Remove dressing after 24 hours  Leave glue in place  Return to your normal activities    Contact Interventional Radiology at [phone] Sl[place] PATIENTS: Contact Interventional Radiology at [phone]) Luiza Oquendo PATIENTS: Contact Interventional Radiology at [phone]) if:  You have a fever and your wound is red and swollen  You have yellow, green, or bad-smelling discharge coming from your wound  You have pain or swelling in your abdomen  You have an upset stomach or you vomit  You have sudden, sharp pain in your abdomen  You urinate very little or not at all  You feel confused and more tired than usual    Your arm or leg feels warm, tender, and painful  It may look swollen and red  You suddenly feel lightheaded and have trouble breathing

## 2022-07-20 NOTE — UTILIZATION REVIEW
Initial Clinical Review    Admission: Date/Time/Statement:   Admission Orders (From admission, onward)     Ordered        07/19/22 0047  INPATIENT ADMISSION  Once                      Orders Placed This Encounter   Procedures    INPATIENT ADMISSION     Telemetry     Standing Status:   Standing     Number of Occurrences:   1     Order Specific Question:   Level of Care     Answer:   Med Surg [16]     Order Specific Question:   Bed request comments     Answer:   telemetry     Order Specific Question:   Estimated length of stay     Answer:   More than 2 Midnights     Order Specific Question:   Certification     Answer:   I certify that inpatient services are medically necessary for this patient for a duration of greater than two midnights  See H&P and MD Progress Notes for additional information about the patient's course of treatment  ED Arrival Information     Expected   -    Arrival   7/18/2022 23:12    Acuity   Emergent            Means of arrival   Ambulance    Escorted by   [facility]    Admission type   Emergency            Arrival complaint   SOB           Chief Complaint   Patient presents with    Shortness of Breath     Per family: pt was at home tonight, complained of sudden onset sharp L chest pain/SOB  Inhaler at home with no relief  +eliquis  +cardiac hx       Initial Presentation: [de-identified] y o  male presented to the ED with an episode of chest pain, radiating to the left arm shortness of breath  The patient states this lasted only 10 minutes and has not recurred  The patient also is complaining of extreme fatigue, and cough  The patient also reports generalized abdominal pain and tenderness with associated "fluid", presumably associated with underlying hepatic cirrhosis The patient stated he is also scheduled to undergo paracentesis today   PMH: cirrhosis, Afib, ESRD on dialysis, BPH, CHF, GERD, HLD, HTN, MI  PE: murmur, diminished breath sounds, abd tenderness, BLE +1 pitting edema  Plan: Inpatient admission for evaluation and treatment of hypertensive heart disease with heart failure, ESRD, pneumonia, acute hepatic encephalopathy, chest pain: IV ceftriaxone, lactulose 20 g with goal of 3 BMs per day, continue Eliquis and metoprolol XL, telemetry  Ordered IR paracentesis with fluid analysis studies, albumin per protocol  Nephrology consult: plan for hemodialysis tomorrow, patient receives IV iron, will hold at this time due to active infectious concerns, tentatively plan for paracentesis, continue midodrine prior to dialysis  Date: 7/20   Day 2:     Nephrology: Notified by the dialysis nurse, patient started reports some chest pressure  Heart rate also started to go up to the 120s to 130s, blood pressure remains low  Will plan to discontinue dialysis at this time  IR: 250 mL pink tinged ascites from right sided thoracentesis  Ascites fluid sent to the laboratory     Internal medicine: Currently holding diuresis with soft Bps  Lactulose 20 g reordered with goal of 3 BM per day  Continue Eliquis and metoprolol XL       ED Triage Vitals [07/18/22 2316]   Temperature Pulse Respirations Blood Pressure SpO2   98 6 °F (37 °C) 86 20 106/57 94 %      Temp Source Heart Rate Source Patient Position - Orthostatic VS BP Location FiO2 (%)   Oral Monitor Sitting Right arm --      Pain Score       9          Wt Readings from Last 1 Encounters:   07/18/22 87 kg (191 lb 12 8 oz)     Additional Vital Signs:     Date/Time Temp Pulse Resp BP MAP (mmHg) SpO2 O2 Device   07/20/22 14:44:11 -- 80 -- -- -- 92 % --   07/20/22 0955 96 4 °F (35 8 °C) Abnormal  92 16 90/56 72 -- --   07/20/22 0950 -- 129 Abnormal  -- 80/53 Abnormal  -- 94 % --   07/20/22 0945 -- 129 Abnormal  -- 74/56 Abnormal  -- 94 % --   07/20/22 0930 -- 90 15 86/51 Abnormal  65 -- --   07/20/22 0915 -- -- -- 83/48 Abnormal  66 -- --   07/20/22 0900 -- 87 16 85/51 Abnormal  66 -- --   07/20/22 0845 -- -- -- 87/53 Abnormal  72 -- --   07/20/22 0830 -- 87 15 88/53 Abnormal  66 -- --   07/20/22 0815 -- 69 -- 86/49 Abnormal  65 -- --   07/20/22 0800 -- 74 16 84/54 Abnormal  64 Abnormal  -- --   07/20/22 0730 -- 80 15 87/51 Abnormal  66 -- --   07/20/22 07:26:53 98 4 °F (36 9 °C) 86 16 80/55 Abnormal  63 Abnormal  92 % --   07/20/22 0720 97 8 °F (36 6 °C) 81 16 95/46 Abnormal  69 -- --   07/19/22 21:40:46 98 2 °F (36 8 °C) 80 16 100/65 77 92 % --   07/19/22 17:53:15 -- 78 -- -- -- 92 % --   07/19/22 1600 -- -- -- -- -- -- None (Room air)   07/19/22 15:32:29 98 °F (36 7 °C) 76 -- 91/57 68 92 % --   07/19/22 1509 -- 82 16 109/67 -- 92 % None (Room air)   07/19/22 1306 -- 65 14 87/55 Abnormal  -- 92 % None (Room air)   07/19/22 1130 -- 81 16 93/53 -- 92 % None (Room air)   07/19/22 1022 -- 120 Abnormal  -- 77/54 Abnormal  -- -- --   07/19/22 1000 -- 120 Abnormal  14 77/54 Abnormal  -- 97 % None (Room air)   07/19/22 0745 -- 78 16 98/55 -- 98 % None (Room air)   07/19/22 0700 -- 82 16 91/55 -- 92 % None (Room air)   07/19/22 0600 -- 79 18 87/53 Abnormal  -- 93 % None (Room air)   07/19/22 0354 -- 86 93 Abnormal  85/51 Abnormal  -- 94 % None (Room air)   07/19/22 0254 -- 87 20 80/46 Abnormal  -- 92 % None (Room air)   07/19/22 0211 -- 82 -- 85/53 Abnormal  -- -- --   07/19/22 0124 -- 128 Abnormal  20 92/53 -- 91 % None (Room air)       Pertinent Labs/Diagnostic Test Results:   XR chest 1 view portable   ED Interpretation by Jocelyn Mckeon MD (07/18 2342)   Cardiomegaly, increased vascular markings and no infiltrate or PTX read by me      Final Result by Lamon Curling, MD (07/19 0326)      Cardiomegaly  Redemonstrated small right pleural effusion                    Workstation performed: [id number]         IR INPATIENT Paracentesis    (Results Pending)       Results from last 7 days   Lab Units 07/19/22  0046   SARS-COV-2  Negative     Results from last 7 days   Lab Units 07/20/22  0615 07/19/22  0748 07/18/22  2340 07/18/22  1051   WBC Thousand/uL 5 27 5 59 5 89 5 59   HEMOGLOBIN g/dL 7 2* 7 4* 7 7* 7 9*   HEMATOCRIT % 22 1* 22 5* 23 8* 24 8*   PLATELETS Thousands/uL 80* 78* 92* 98*   NEUTROS ABS Thousands/µL  --   --  3 26  --          Results from last 7 days   Lab Units 07/20/22  0521 07/19/22  1116 07/18/22  2340   SODIUM mmol/L 130* 131* 133*   POTASSIUM mmol/L 4 4 3 9 3 5   CHLORIDE mmol/L 96 95* 100   CO2 mmol/L 24 29 25   ANION GAP mmol/L 10 7 8   BUN mg/dL 42* 32* 25   CREATININE mg/dL 6 18* 4 81* 4 40*   EGFR ml/min/1 73sq m 7 10 11   CALCIUM mg/dL 8 6 8 4 7 7*     Results from last 7 days   Lab Units 07/20/22  0521 07/19/22  1116 07/18/22  2340 07/18/22  2339   AST U/L 145* 130* 122*  --    ALT U/L 57* 57* 49  --    ALK PHOS U/L 120* 138* 136*  --    TOTAL PROTEIN g/dL 6 5 6 1* 6 2*  --    ALBUMIN g/dL 2 2* 2 1* 2 0*  --    TOTAL BILIRUBIN mg/dL 1 72* 1 44* 1 45*  --    AMMONIA umol/L  --   --   --  155*         Results from last 7 days   Lab Units 07/20/22  0521 07/19/22  1116 07/18/22  2340   GLUCOSE RANDOM mg/dL 83 88 97              Results from last 7 days   Lab Units 07/18/22  2339   PH HARJEET  7 482*   PCO2 HARJEET mm Hg 41 1*   PO2 HARJEET mm Hg 37 5   HCO3 HARJEET mmol/L 30 1*   BASE EXC HARJEET mmol/L 6 1   O2 CONTENT HARJEET ml/dL 8 4   O2 HGB, VENOUS % 68 8         Results from last 7 days   Lab Units 07/18/22  2340   CK TOTAL U/L 205   CK MB INDEX % 1 8   CK MB ng/mL 3 6     Results from last 7 days   Lab Units 07/19/22  0307 07/19/22  0118 07/18/22  2340   HS TNI 0HR ng/L  --   --  57*   HS TNI 2HR ng/L  --  67*  --    HSTNI D2 ng/L  --  10  --    HS TNI 4HR ng/L 71*  --   --    HSTNI D4 ng/L 14  --   --          Results from last 7 days   Lab Units 07/19/22  0655 07/18/22  2340 07/18/22  1051   PROTIME seconds 32 6* 24 3* 24 9*   INR  3 27* 2 23* 2 39*   PTT seconds 60* 53*  --          Results from last 7 days   Lab Units 07/20/22  0521 07/18/22  2340   PROCALCITONIN ng/ml 1 64* 1 36*     Results from last 7 days   Lab Units 07/19/22  0224 07/18/22  2339   LACTIC ACID mmol/L 2 5* 2 8*             Results from last 7 days   Lab Units 07/18/22  2340   BNP pg/mL 1,529*             Results from last 7 days   Lab Units 07/18/22  2340   LIPASE u/L 51           Results from last 7 days   Lab Units 07/19/22  0046   INFLUENZA A PCR  Negative   INFLUENZA B PCR  Negative   RSV PCR  Negative           Results from last 7 days   Lab Units 07/18/22  2343 07/18/22  2341   BLOOD CULTURE  No Growth at 24 hrs  No Growth at 24 hrs                 ED Treatment:   Medication Administration from 07/18/2022 2312 to 07/19/2022 1518       Date/Time Order Dose Route Action     07/18/2022 2328 albuterol (FOR EMS ONLY) (2 5 mg/3 mL) 0 083 % inhalation solution 2 5 mg 0 mg Does not apply Given to EMS     07/18/2022 2328 ipratropium-albuterol (FOR EMS ONLY) (DUO-NEB) 0 5-2 5 mg/3 mL inhalation solution 3 mL 0 mL Does not apply Given to EMS     07/19/2022 0116 thiamine (VITAMIN B1) 100 mg in sodium chloride 0 9 % 50 mL IVPB 100 mg Intravenous New Bag     07/19/2022 0955 atorvastatin (LIPITOR) tablet 40 mg 40 mg Oral Given     07/19/2022 0955 docusate sodium (COLACE) capsule 100 mg 100 mg Oral Given     07/19/2022 0954 lactulose oral solution 20 g 20 g Oral Given     07/19/2022 1116 midodrine (PROAMATINE) tablet 10 mg 10 mg Oral Given     07/19/2022 0745 midodrine (PROAMATINE) tablet 10 mg 10 mg Oral Given     07/19/2022 0348 oxyCODONE (ROXICODONE) IR tablet 2 5 mg 2 5 mg Oral Given     07/19/2022 0644 pantoprazole (PROTONIX) EC tablet 40 mg 40 mg Oral Given     07/19/2022 0305 cefTRIAXone (ROCEPHIN) IVPB (premix in dextrose) 1,000 mg 50 mL 1,000 mg Intravenous New Bag     07/19/2022 0653 heparin (ACS LOW)   Intravenous Not Given     07/19/2022 0644 sodium chloride 0 9 % bolus 500 mL 500 mL Intravenous New Bag     07/19/2022 1115 clotrimazole (LOTRIMIN) 1 % cream   Topical Given     07/19/2022 0909 apixaban (ELIQUIS) tablet 2 5 mg 2 5 mg Oral Given     07/19/2022 8407 oxyCODONE (ROXICODONE) IR tablet 2 5 mg 2 5 mg Oral Given     07/19/2022 1128 albumin human (FLEXBUMIN) 25 % injection 25 g 25 g Intravenous New Bag        Past Medical History:   Diagnosis Date    Acute renal failure superimposed on stage 4 chronic kidney disease ([place] [address] ) 10/04/2020    Arthritis     BPH without urinary obstruction     CKD (chronic kidney disease), stage III (HCC)     baseline 1 6-1 7    Dialysis patient ([place] [address] )     GERD (gastroesophageal reflux disease)     Hyperlipidemia     Hypertension     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 (HCC)     MI (myocardial infarction) ([place] [address] )      Present on Admission:   Acute hepatic encephalopathy   Atrial fibrillation ([place] [address] )   Hypertensive heart disease with heart failure (HCC)      Admitting Diagnosis: Shortness of breath [R06 02]  CHF (congestive heart failure) (HCC) [I50 9]  Hyperammonemia (HCC) [E72 20]  Chest pain [R07 9]  Hypoxia [R09 02]  Elevated troponin [R77 8]  ESRD (end stage renal disease) on dialysis ([place] [address] ) [N18 6, Z99 2]  Elevated lactic acid level [R79 89]  Age/Sex: [de-identified] y o  male  Admission Orders:  Scheduled Medications:  apixaban, 2 5 mg, Oral, BID  atorvastatin, 40 mg, Oral, Daily  clotrimazole, , Topical, BID  docusate sodium, 100 mg, Oral, BID  gabapentin, 100 mg, Oral, HS  lactulose, 20 g, Oral, Daily  melatonin, 3 mg, Oral, HS  metoprolol succinate, 12 5 mg, Oral, BID  midodrine, 10 mg, Oral, TID AC  pantoprazole, 40 mg, Oral, Early Morning      Continuous IV Infusions:     PRN Meds:  acetaminophen, 650 mg, Oral, Q6H PRN  doxercalciferol, 2 5 mcg, Intravenous, After Dialysis  epoetin uday, 4,000 Units, Intravenous, After Dialysis  heparin (porcine), 1,000 Units, Intravenous, Once PRN  lidocaine, , Topical, Before Dialysis  midodrine, 10 mg, Oral, Before Dialysis  oxyCODONE, 2 5 mg, Oral, Q6H PRN  trimethobenzamide, 200 mg, Intramuscular, Q6H PRN        IP CONSULT TO NEPHROLOGY    Network Utilization Review Department  ATTENTION: Please call with any questions or concerns to [phone] and carefully listen to the prompts so that you are directed to the right person  All voicemails are confidential   Alex Dodson all requests for admission clinical reviews, approved or denied determinations and any other requests to dedicated fax number below belonging to the campus where the patient is receiving treatment   List of dedicated fax numbers for the Facilities:  [address] DENIALS (Administrative/Medical Necessity) [phone]   [address] (Maternity/NICU/Pediatrics) [phone]   [address]  [address] [place] [address] [address] [place] [address] [address] [place] [address] [phone]

## 2022-07-20 NOTE — TREATMENT PLAN
Nephrology    Notified by the dialysis nurse, patient started reports some chest pressure  Heart rate also started to go up to the 120s to 130s, blood pressure remains low  Primary dialysis nurse communicated to the primary nurse  Will plan to discontinue dialysis at this time

## 2022-07-20 NOTE — PROCEDURES
NEPHROLOGY DIALYSIS PROCEDURE NOTE      Patient seen and examined on Hemodialysis, tolerating procedure well  All documentation, labs, medications were reviewed by myself, and the treatment plan was reviewed with nurse and patient  Seen on Dialysis at : 9:35 AM  Dialysis Access: Left arm AV graft  Vitals:  86/58  Dialysis time: 3 hours  Dialyzer: F180  Sodium bath: 138  Potassium bath: 3 K+  Bicarbonate bath: 35  Calcium bath: 2 5  Ultrafiltration: run even  Blood flow rate: 350 cc/min  Dialysis flow rate: 1 5 X Qb  Dialysis temperature: 35C  Medications given on HD:  Midodrine 10 mg prior to dialysis, another dose of midodrine 10 mg in the middle of dialysis    Assessment/Plan:    End Stage Kidney Disease  -Modality:In-Center Hemodialysis  -Schedule: Monday/Wednesday/Friday  -Dialysis Unit: [address] [address]  -Access: Left arm AV graft  -Presciption:  Reviewed  Dry weight 81 5 kg  Receives midodrine 10 mg prior to dialysis and another 10 mg during dialysis  -Status:  seen on dialysis today  -Plan:   · Seen on dialysis today  · Ultrafiltration limited due to chronic hypotension  · Plan for next dialysis on Friday, if remains inpatient     Anemia of chronic kidney disease  --continue Epogen with dialysis  --patient also receives IV iron, will hold at this time due to active infectious concerns  --hemoglobin below target     Chest pain/shortness of breath  --held off on any further antibiotics  --viral plan on including influenza and COVID were negative  --clinically feeling better, but feeling weak still  --no ST elevations on EKG  --planning for paracentesis, which may help with some relief of his shortness of breath     Lactic acidosis  --mild 2 5  --met SIRS criteria  --chronic hypotension  --received a dose of ceftriaxone, currently holding antibiotics     Hepatic encephalopathy  --elevated ammonia level     Cirrhosis  --plan for paracentesis    Plan for IV albumin for large volume paracentesis     Mineral bone disorder-chronic kidney disease  --continue Hectorol with dialysis     Chronic hypotension  --midodrine 10 mg t i d , receives 10 mg prior to dialysis and then sometimes during dialysis as well    Review of Systems: The entire 12 point ROS has been reviewed      Physical Exam:    General:  Chronically ill-appearing but no acute distress  Skin:  No rashes no lesions  CVS:  S1-S2 appreciated regular rhythm  Lungs:  Decreased breath sounds at the bases  Abdomen:  Distended, soft, nontender  Access:  Left arm AV graft with an audible bruit and palpable thrill  Extremities:  Positive edema bilaterally  Neuro:  No asterixis          Current Facility-Administered Medications:     acetaminophen (TYLENOL) tablet 650 mg, 650 mg, Oral, Q6H PRN, Antonio Grooms, DO    apixaban (ELIQUIS) tablet 2 5 mg, 2 5 mg, Oral, BID, Antonio Grooms, DO, 2 5 mg at 07/19/22 1751    atorvastatin (LIPITOR) tablet 40 mg, 40 mg, Oral, Daily, Fred Brown MD, 40 mg at 07/19/22 0955    clotrimazole (LOTRIMIN) 1 % cream, , Topical, BID, Fred Brown MD, Given at 07/19/22 1805    docusate sodium (COLACE) capsule 100 mg, 100 mg, Oral, BID, Fred Brown MD, 100 mg at 07/19/22 1752    doxercalciferol (HECTOROL) injection 2 5 mcg, 2 5 mcg, Intravenous, After Dialysis, Nola Garcia MD, 2 5 mcg at 07/20/22 0826    epoetin uday (EPOGEN,PROCRIT) injection 4,000 Units, 4,000 Units, Intravenous, After Dialysis, Nola Garcia MD, 4,000 Units at 07/20/22 0826    gabapentin (NEURONTIN) capsule 100 mg, 100 mg, Oral, HS, Fred Brown MD, 100 mg at 07/19/22 2255    heparin (porcine) injection 1,000 Units, 1,000 Units, Intravenous, Once PRN, Nola Garcia MD    lactulose oral solution 20 g, 20 g, Oral, Daily, Fred Brown MD, 20 g at 07/19/22 0954    lidocaine (LMX) 4 % cream, , Topical, Before Dialysis, Nola Garcia MD    melatonin tablet 3 mg, 3 mg, Oral, HS, Kacy Phyllis, DO, 3 mg at 07/19/22 2332    metoprolol succinate (TOPROL-XL) 24 hr tablet 12 5 mg, 12 5 mg, Oral, BID, Yemi Wang MD    midodrine (PROAMATINE) tablet 10 mg, 10 mg, Oral, TID AC, Yemi Wang MD, 10 mg at 07/20/22 0526    midodrine (PROAMATINE) tablet 10 mg, 10 mg, Oral, Before Dialysis, Frederic Swanson MD, 10 mg at 07/20/22 0715    oxyCODONE (ROXICODONE) IR tablet 2 5 mg, 2 5 mg, Oral, Q6H PRN, Lindsay Cruz DO, 2 5 mg at 07/19/22 0955    pantoprazole (PROTONIX) EC tablet 40 mg, 40 mg, Oral, Early Morning, Yemi Wang MD, 40 mg at 07/20/22 0526    trimethobenzamide (TIGAN) IM injection 200 mg, 200 mg, Intramuscular, Q6H PRN, Lindsay Cruz DO, 200 mg at 07/19/22 1610

## 2022-07-20 NOTE — BRIEF OP NOTE (RAD/CATH)
INTERVENTIONAL RADIOLOGY PROCEDURE NOTE    Date: 7/20/2022    Procedure: Paracentesis    Preoperative diagnosis:   1  Chest pain    2  Elevated troponin    3  CHF (congestive heart failure) ([address] )    4  Hyperammonemia (HCC)    5  Elevated lactic acid level    6  Hypoxia    7  ESRD (end stage renal disease) on dialysis [facility])         Postoperative diagnosis: Same  Surgeon: Kaya Byrd MD     Assistant: None  No qualified resident was available  Blood loss: None    Specimens: Ascites fluid sent to the laboratory     Findings: Ultrasound-guided paracentesis performed  Please see the radiology report for details  Complications: None immediate      Anesthesia: local

## 2022-07-20 NOTE — SPEECH THERAPY NOTE
Speech-Language Pathology Bedside Swallow Evaluation        Patient Name: Amelia Meigs    Today's Date: 7/20/2022     Problem List  Principal Problem:    Hypertensive heart disease with heart failure [facility])  Active Problems:    Pneumonia due to infectious organism    Atrial fibrillation ([place] [address] )    ESRD (end stage renal disease) on dialysis [facility])    Acute hepatic encephalopathy    Chest pain at rest         Past Medical History  Past Medical History:   Diagnosis Date    Acute renal failure superimposed on stage 4 chronic kidney disease ([place] [address] ) 10/04/2020    Arthritis     BPH without urinary obstruction     CKD (chronic kidney disease), stage III (HCC)     baseline 1 6-1 7    Dialysis patient ([place] [address] )     GERD (gastroesophageal reflux disease)     Hyperlipidemia     Hypertension     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 ([place] [address] )     MI (myocardial infarction) ([place] [address] )          Summary    Pt presents with normal oral and pharyngeal swallowing, no c/o food dysphagia or overt s/s aspiration  Recommendations:   Diet: regular diet and thin liquids   Meds: whole with liquid   Frequent Oral care: 2x/day  Other Recommendations/ considerations: no follow up tx needed  Current Medical Status  Pt is a [de-identified] y o  male who presented to [address] [place] [address]  with hypertensive heart disease w/ heart failure  Pt presents with an episode of chest pain, radiating to the left arm shortness of breath  The patient states this lasted only 10 minutes and has not recurred  The patient also is complaining of extreme fatigue, and cough  The patient also reports generalized abdominal pain and tenderness with associated "fluid", presumably associated with underlying hepatic cirrhosis The patient stated he is also scheduled to undergo paracentesis today  Past medical history:   Please see H&P for details    Special Studies:  CXR: 7/18/22 Cardiomegaly    Redemonstrated small right pleural effusion        Social/Education/Vocational Hx:  Pt lives with family    Swallow Information   Current Risks for Dysphagia & Aspiration: c/o SOB  Current Symptoms/Concerns: change in respiratory status  Current Diet: regular diet and thin liquids   Baseline Diet: regular diet and thin liquids  Takes pills- whole w/ water    Baseline Assessment   Behavior/Cognition: alert  Speech/Language Status: able to participate in conversation and able to follow commands  Patient Positioning: upright in bed     Swallow Mechanism Exam   Facial: symmetrical  Labial: WFL  Lingual: WFL  Velum: unable to visualize  Mandible: adequate ROM  Dentition: upper dentures  Vocal quality:clear/adequate   Volitional Cough: strong/productive   Respiratory: RA    Consistencies Assessed and Performance   Consistencies Administered: thin liquids, nectar thick, puree and soft solids (toast)    Oral Stage: pt able to bite toast, drink from cup and straw  Mastication, manipulation and transfer of all consistencies appeared WNL  Pt w/ good oral control and transfer of liquids  Pharyngeal Stage: swallows appeared timely and complete with no coughing, throat clearing or wet voice noted         Esophageal Concerns: none reported      Results Reviewed with: patient and MD   Dysphagia Goals: none at this time      April Ina Navarro [facility]SLP  Speech Pathologist  [place] license # [id number] ([facility]) [id number]  [place] license # [id number]  Available via [employer]

## 2022-07-20 NOTE — PLAN OF CARE
Started patient on hemodialysis treatment with UF goal of 2L net x 3 hours x 3K bath for serum k+ of 4 4 today 7/20/22  Post-Dialysis RN Treatment Note   Blood Pressure:  Pre 95/46 mm/Hg  Post 90/56 mmHg   EDW  84 5 kg    Weight:  Pre 94 kg   Post 94 kg   Mode of weight measurement: Bed Scale   Volume Removed  0 ml    Treatment duration 140 minutes    NS given  No    Treatment shortened? Yes, describe: Tachycardia followed by patient reporting "My chest is not feeling well", (-) pain but + pressure as per pt  Relayed to the primary RN then to Dr Armando Campbell who's still within the floor and advised to stop the dialysis tx  Medications given during Rx Midodrine 10mg pre-HD and another 10mg intra-HD, Epogen 4,000u IV and Hectorol 2 5mcg IV     Estimated Kt/V  Not Applicable   Access type: AV graft   Access Issues: No    Report called to primary nurse   Yes, in person to [facility] RN      Problem: METABOLIC, FLUID AND ELECTROLYTES - ADULT  Goal: Electrolytes maintained within normal limits  Description: INTERVENTIONS:  - Monitor labs and assess patient for signs and symptoms of electrolyte imbalances  - Administer electrolyte replacement as ordered  - Monitor response to electrolyte replacements, including repeat lab results as appropriate  - Instruct patient on fluid and nutrition as appropriate  Outcome: Progressing  Goal: Fluid balance maintained  Description: INTERVENTIONS:  - Monitor labs   - Monitor I/O and WT  - Instruct patient on fluid and nutrition as appropriate  - Assess for signs & symptoms of volume excess or deficit  Outcome: Progressing

## 2022-07-21 NOTE — ASSESSMENT & PLAN NOTE
Lab Results   Component Value Date    EGFR 8 07/21/2022    EGFR 7 07/20/2022    EGFR 10 07/19/2022    CREATININE 5 66 (H) 07/21/2022    CREATININE 6 18 (H) 07/20/2022    CREATININE 4 81 (H) 07/19/2022   · HD M/W/F  · Some hypotension and chest discomfort today while in dialysis  · Dialysis stopped, symptoms resolved    Last dialysis session 7/18/22  Patient on tri weekly schedule of Monday Wednesday and Friday

## 2022-07-21 NOTE — ASSESSMENT & PLAN NOTE
History of Joselito Crisostomo cirrhosis  Ammonia elevated at 155  Patient states he has been extremely fatigued but now he is alert and oriented  Lactulose 20 g reordered with goal of 3 BM per day  Paracentesis for ascites today

## 2022-07-21 NOTE — DISCHARGE SUMMARY
[facility] [place] 1940, [de-identified] y o  male MRN: [id number]  Unit/Bed#: S -01 Encounter: [id number]  Primary Care Provider: Iza Martinez   Date and time admitted to hospital: 7/18/2022 11:12 PM    Chest pain at rest  Assessment & Plan  Patient reports episode of chest pain, with left arm pain and SOB lasting approximately 10 minutes occurred 5:00 p m  On 07/18  Patient is dated symptoms have not recurred since  Trop: 57 -67- 71  No ischemic changes on ekg  Possibly in the setting of ascites  Resolved              Hepatic cirrhosis (HCC)  Assessment & Plan  History of [place] cirrhosis  Ammonia elevated at 155  Patient states he has been extremely fatigued but now he is alert and oriented  Lactulose 20 g reordered with goal of 3 BM per day  Paracentesis for ascites 250 mL - no growth on gram stain, saag score 1 6 consistent with portal hypertension  Refer to GI    Anemia  Assessment & Plan  · Chronic macrocytic  · Received epogen as an outpatient  · Stable  · No active bleeding  ESRD (end stage renal disease) on dialysis [facility])  Assessment & Plan  Lab Results   Component Value Date    EGFR 8 07/21/2022    EGFR 7 07/20/2022    EGFR 10 07/19/2022    CREATININE 5 66 (H) 07/21/2022    CREATININE 6 18 (H) 07/20/2022    CREATININE 4 81 (H) 07/19/2022   · HD M/W/F  · Some hypotension and chest discomfort today while in dialysis  · Dialysis stopped, symptoms resolved    Last dialysis session 7/18/22  Patient on tri weekly schedule of Monday Wednesday and Friday    Atrial fibrillation [facility])  Assessment & Plan  Admission EKG demonstrates AFib, nonspecific T-wave abnormalities    Continue Eliquis 2 5 b i d    Continue metoprolol XL 12 5 b i d     * Hypertensive heart disease with heart failure [facility])  Assessment & Plan  Wt Readings from Last 3 Encounters:   07/18/22 87 kg (191 lb 12 8 oz)   07/12/22 87 2 kg (192 lb 3 9 oz)   06/07/22 87 3 kg (192 lb 8 oz)     Continue midodrine      Medical Problems             Resolved Problems  Date Reviewed: 7/18/2022   None               Discharging Resident: Lyla Pascual DO  Discharging Attending: No att  providers found  PCP: EL Hernandez  Admission Date:   Admission Orders (From admission, onward)     Ordered        07/19/22 0047  INPATIENT ADMISSION  Once                      Discharge Date: 07/21/22    Consultations During Hospital Stay:  · Nephrology    Procedures Performed:   · Paracentesis    Significant Findings / Test Results:   · Please see hospital course    Incidental Findings:   · N/A     Test Results Pending at Discharge (will require follow up):  Paracentesis cytology results     Outpatient Tests Requested:  · None    Complications:  None    Reason for Admission: Chest pain, sob    Hospital Course:   Gab Spear is a [de-identified] y o  male patient who originally presented to the hospital on 7/18/2022 due to chest pain  He has a history of prior thoracic aortic dissection, paroxysmal atrial fibrillation, ESRD with dialysis Monday Wednesday Friday, CHF, LOPEZ cirrhosis, HLD, hypotension supported by midodrine who presented to the hospital with acute onset of left-sided chest pain and shortness  On ED evaluation, patient was hypotensive, afebrile  Lab work revealed elevated lactic acid 2 5  Mild hyponatremia  Mildly elevated alkaline phosphatase and total bilirubin  EKG showed atrial fibrillation with no ST elevations, appears similar to prior  COVID/flu/RSV negative  Ammonia elevated at 155  Mild troponin elevation 57/67/71  Chest x-ray showed some cardiomegaly and possible small right pleural effusion  BNP elevated at 1529  He underwent IR paracentesis and fluid studies showed saag score of 1 6, consistent with portal hypertension  He underwent hemodialysis while in the hospital   He is feeling pain-free on day of discharge  Please see above list of diagnoses and related plan for additional information  Condition at Discharge: stable    Discharge Day Visit / Exam:   Subjective:  Patient denies any pain or discomfort  Vitals: Blood Pressure: 90/51 (07/21/22 0745)  Pulse: 85 (07/21/22 0745)  Temperature: 98 3 °F (36 8 °C) (07/21/22 0745)  Temp Source: Oral (07/20/22 2105)  Respirations: 18 (07/20/22 2105)  Height: 5' 7" (170 2 cm) (07/18/22 2316)  Weight - Scale: 87 kg (191 lb 12 8 oz) (07/18/22 2316)  SpO2: 92 % (07/21/22 0745)    Exam:   Physical Exam  Constitutional:       Appearance: Normal appearance  HENT:      Head: Normocephalic and atraumatic  Cardiovascular:      Rate and Rhythm: Normal rate and regular rhythm  Pulmonary:      Effort: Pulmonary effort is normal       Breath sounds: Normal breath sounds  Abdominal:      General: Bowel sounds are normal       Palpations: Abdomen is soft  Tenderness: There is no abdominal tenderness  Musculoskeletal:      Right lower leg: Edema present  Left lower leg: Edema present  Skin:     General: Skin is warm and dry  Neurological:      General: No focal deficit present  Mental Status: He is alert and oriented to person, place, and time  Psychiatric:         Mood and Affect: Mood normal          Behavior: Behavior normal           Discussion with Family: Updated  (daughter) via phone  Discharge instructions/Information to patient and family:   See after visit summary for information provided to patient and family  Provisions for Follow-Up Care:  See after visit summary for information related to follow-up care and any pertinent home health orders  Disposition:   Home    Planned Readmission: None    Discharge Medications:  See after visit summary for reconciled discharge medications provided to patient and/or family        **Please Note: This note may have been constructed using a voice recognition system**

## 2022-07-21 NOTE — PROGRESS NOTES
[facility]  Progress Note - Thao Negron ROMULO [place] 1940, [de-identified] y o  male MRN: [id number]  Unit/Bed#: S -01 Encounter: [id number]  Primary Care Provider: Hung Andino   Date and time admitted to hospital: 7/18/2022 11:12 PM    Chest pain at rest  Assessment & Plan  Patient reports episode of chest pain, with left arm pain and SOB lasting approximately 10 minutes occurred 5:00 p m  On 07/18  Patient is dated symptoms have not recurred since  Trop: 57 -67- 71  No ischemic changes on ekg  Possibly in the setting of ascites  Resolved              Hepatic cirrhosis (HCC)  Assessment & Plan  History of [place] cirrhosis  Ammonia elevated at 155  Patient states he has been extremely fatigued but now he is alert and oriented  Lactulose 20 g reordered with goal of 3 BM per day  Paracentesis for ascites today    Anemia  Assessment & Plan  · Chronic macrocytic  · Received epogen as an outpatient  · Stable  · No active bleeding  ESRD (end stage renal disease) on dialysis [facility])  Assessment & Plan  Lab Results   Component Value Date    EGFR 7 07/20/2022    EGFR 10 07/19/2022    EGFR 11 07/18/2022    CREATININE 6 18 (H) 07/20/2022    CREATININE 4 81 (H) 07/19/2022    CREATININE 4 40 (H) 07/18/2022   · HD M/W/F  · Some hypotension and chest discomfort today while in dialysis  · Dialysis stopped, symptoms resolved    Last dialysis session 7/18/22  Patient on tri weekly schedule of Monday Wednesday and Friday    Atrial fibrillation [facility])  Assessment & Plan  Admission EKG demonstrates AFib, nonspecific T-wave abnormalities    Continue Eliquis 2 5 b i d    Continue metoprolol XL 12 5 b i d     * Hypertensive heart disease with heart failure [facility])  Assessment & Plan  Wt Readings from Last 3 Encounters:   07/18/22 87 kg (191 lb 12 8 oz)   07/12/22 87 2 kg (192 lb 3 9 oz)   06/07/22 87 3 kg (192 lb 8 oz)     Continue midodrine          VTE Pharmacologic Prophylaxis: VTE Score: 6 High Risk (Score >/= 5) - Pharmacological DVT Prophylaxis Ordered: apixaban (Eliquis)  Sequential Compression Devices Ordered  Patient Centered Rounds:  Discussions with Specialists or Other Care Team Provider:  Nephrology, Interventional Radiology    Education and Discussions with Family / Patient: Updated  (daughter) via phone  Current Length of Stay: 1 day(s)  Current Patient Status: Inpatient   Discharge Plan: Anticipate discharge in 24-48 hrs to home  Code Status: Level 3 - DNAR and DNI    Subjective:   Patient examined while undergoing dialysis  He denies any current pain or discomfort on my examination  However later, the nurse did report to me that he had developed some chest pressure and hypotension while during dialysis  Dialysis was stopped and his symptoms resolved    Objective:     Vitals:   Temp (24hrs), Av 8 °F (36 6 °C), Min:96 4 °F (35 8 °C), Max:98 4 °F (36 9 °C)    Temp:  [96 4 °F (35 8 °C)-98 4 °F (36 9 °C)] 98 3 °F (36 8 °C)  HR:  [] 80  Resp:  [15-18] 18  BP: (74-95)/(42-56) 87/51  SpO2:  [91 %-94 %] 94 %  Body mass index is 30 04 kg/m²  Input and Output Summary (last 24 hours): Intake/Output Summary (Last 24 hours) at 20220  Last data filed at 2022 1000  Gross per 24 hour   Intake 400 ml   Output 400 ml   Net 0 ml       Physical Exam:   Physical Exam  Constitutional:       Appearance: Normal appearance  HENT:      Head: Normocephalic and atraumatic  Cardiovascular:      Rate and Rhythm: Normal rate and regular rhythm  Pulmonary:      Effort: Pulmonary effort is normal       Breath sounds: Normal breath sounds  Abdominal:      General: Bowel sounds are normal  There is distension  Palpations: Abdomen is soft  Tenderness: There is no abdominal tenderness  Musculoskeletal:      Right lower leg: Edema present  Left lower leg: Edema present  Skin:     General: Skin is warm and dry  Neurological:      General: No focal deficit present  Mental Status: He is alert and oriented to person, place, and time  Psychiatric:         Mood and Affect: Mood normal          Behavior: Behavior normal           Additional Data:     Labs:  Results from last 7 days   Lab Units 07/20/22  0615 07/19/22  0748 07/18/22  2340   WBC Thousand/uL 5 27   < > 5 89   HEMOGLOBIN g/dL 7 2*   < > 7 7*   HEMATOCRIT % 22 1*   < > 23 8*   PLATELETS Thousands/uL 80*   < > 92*   NEUTROS PCT %  --   --  55   LYMPHS PCT %  --   --  15   MONOS PCT %  --   --  26*   EOS PCT %  --   --  3    < > = values in this interval not displayed  Results from last 7 days   Lab Units 07/20/22  0521   SODIUM mmol/L 130*   POTASSIUM mmol/L 4 4   CHLORIDE mmol/L 96   CO2 mmol/L 24   BUN mg/dL 42*   CREATININE mg/dL 6 18*   ANION GAP mmol/L 10   CALCIUM mg/dL 8 6   ALBUMIN g/dL 2 2*   TOTAL BILIRUBIN mg/dL 1 72*   ALK PHOS U/L 120*   ALT U/L 57*   AST U/L 145*   GLUCOSE RANDOM mg/dL 83     Results from last 7 days   Lab Units 07/19/22  0655   INR  3 27*             Results from last 7 days   Lab Units 07/20/22  0521 07/19/22  0224 07/18/22  2340 07/18/22  2339   LACTIC ACID mmol/L  --  2 5*  --  2 8*   PROCALCITONIN ng/ml 1 64*  --  1 36*  --        Lines/Drains:  Invasive Devices  Report    Peripheral Intravenous Line  Duration           Peripheral IV 07/18/22 Right;Ventral (anterior) Forearm 1 day          Line  Duration           Hemodialysis AV Fistula 03/26/21 Left Forearm 481 days              Imaging: No pertinent imaging reviewed  Recent Cultures (last 7 days):   Results from last 7 days   Lab Units 07/18/22  2343 07/18/22  2341   BLOOD CULTURE  No Growth at 24 hrs  No Growth at 24 hrs         Last 24 Hours Medication List:   Current Facility-Administered Medications   Medication Dose Route Frequency Provider Last Rate    acetaminophen  650 mg Oral Q6H PRN Vara Nakai, DO      apixaban  2 5 mg Oral BID Vara Nakai, DO      atorvastatin  40 mg Oral Daily Shannon Robin MD     [facility] clotrimazole   Topical BID Faustine Necessary, MD      docusate sodium  100 mg Oral BID Faustine Necessary, MD      doxercalciferol  2 5 mcg Intravenous After Dialysis Jeny Devlin MD     [facility] epoetin uday  4,000 Units Intravenous After Dialysis Jeny Devlin MD      gabapentin  100 mg Oral HS Faustine Necessary, MD      heparin (porcine)  1,000 Units Intravenous Once PRN Jeny Devlin MD      lactulose  20 g Oral Daily Faustine Necessary, MD      lidocaine   Topical Before Dialysis Jeny Devlin MD      melatonin  3 mg Oral HS Danny Rodas DO      metoprolol succinate  12 5 mg Oral BID Faustine Necessary, MD      midodrine  10 mg Oral TID [facility] Faustine Necessary, MD      midodrine  10 mg Oral Before Dialysis Jeny Devlin MD      oxyCODONE  2 5 mg Oral Q6H PRN Salma Chawla DO      pantoprazole  40 mg Oral Early Morning Faustine Necessary, MD      trimethobenzamide  200 mg Intramuscular Q6H PRN Salma Chawla DO          Today, Patient Was Seen By: Salma Chawla DO    **Please Note: This note may have been constructed using a voice recognition system  **

## 2022-07-21 NOTE — ASSESSMENT & PLAN NOTE
History of Nelida Greening cirrhosis  Ammonia elevated at 155  Patient states he has been extremely fatigued but now he is alert and oriented  Lactulose 20 g reordered with goal of 3 BM per day  Paracentesis for ascites 250 mL - no growth on gram stain, saag score 1 6 consistent with portal hypertension  Refer to GI

## 2022-07-21 NOTE — ASSESSMENT & PLAN NOTE
Wt Readings from Last 3 Encounters:   07/18/22 87 kg (191 lb 12 8 oz)   07/12/22 87 2 kg (192 lb 3 9 oz)   06/07/22 87 3 kg (192 lb 8 oz)     Continue midodrine

## 2022-07-21 NOTE — PROGRESS NOTES
NEPHROLOGY PROGRESS NOTE   Oliver Sharpe [de-identified] y o  male MRN: [id number]  Unit/Bed#: S -01 Encounter: [id number]  Reason for Consult: ESRD      SUMMARY:    51-year-old male with a history of atrial fibrillation, chronic hypotension, ESRD on hemodialysis, cirrhosis who presented for chest pain and shortness of breath  Admitted for empiric pneumonia management  Nephrology consult for ESRD management      ASSESSMENT and PLAN:    End Stage Kidney Disease  -Modality:In-Center Hemodialysis  -Schedule: Monday/Wednesday/Friday  -Dialysis Unit: [address]  -Access: Left arm AV graft  -Presciption:  Reviewed   Dry weight 81 5 kg   Receives midodrine 10 mg prior to dialysis and another 10 mg during dialysis  -Status: HD yesterday, stopped early due to chest pain  -Plan:   · HD tomorrow  · Stable for D/C     Anemia of chronic kidney disease  --continue Epogen with dialysis  --patient also receives IV iron, will hold at this time due to active infectious concerns  --hemoglobin below target     Chest pain/shortness of breath  --held off on any further antibiotics  --viral plan on including influenza and COVID were negative  --clinically feeling better, but feeling weak still  --no ST elevations on EKG   --s/p thoracentesis and paracentesis     Lactic acidosis  --mild 2 5  --met SIRS criteria  --chronic hypotension  --received a dose of ceftriaxone, currently holding antibiotics     Hepatic encephalopathy  --elevated ammonia level     Cirrhosis  --s/p paracentesis       Mineral bone disorder-chronic kidney disease  --continue Hectorol with dialysis     Chronic hypotension  --midodrine 10 mg t i d , receives 10 mg prior to dialysis and then sometimes during dialysis as well      SUBJECTIVE / INTERVAL HISTORY:    Feels better    OBJECTIVE:  Current Weight: Weight - Scale: 87 kg (191 lb 12 8 oz)  Vitals:    07/20/22 1632 07/20/22 2105 07/20/22 2108 07/21/22 0745   BP: 94/50 (!) 92/42 (!) 87/51 90/51   BP Location: Right arm Right arm     Pulse: 79 80  85   Resp: 18 18     Temp: 98 1 °F (36 7 °C) 98 3 °F (36 8 °C)  98 3 °F (36 8 °C)   TempSrc: Oral Oral     SpO2: 91% 94%  92%   Weight:       Height:         No intake or output data in the 24 hours ending 07/21/22 1044    Review of Systems:    12 point ROS has been reviewed  Physical Exam  Vitals and nursing note reviewed  Constitutional:       General: He is not in acute distress  Appearance: He is well-developed  He is not diaphoretic  HENT:      Head: Normocephalic and atraumatic  Eyes:      General: No scleral icterus  Pupils: Pupils are equal, round, and reactive to light  Cardiovascular:      Rate and Rhythm: Normal rate and regular rhythm  Heart sounds: Normal heart sounds  No murmur heard  No friction rub  No gallop  Pulmonary:      Effort: Pulmonary effort is normal  No respiratory distress  Breath sounds: Normal breath sounds  No wheezing or rales  Chest:      Chest wall: No tenderness  Abdominal:      General: Bowel sounds are normal  There is no distension  Palpations: Abdomen is soft  Tenderness: There is no abdominal tenderness  There is no rebound  Musculoskeletal:         General: Normal range of motion  Cervical back: Normal range of motion and neck supple  Skin:     Findings: No rash  Neurological:      Mental Status: He is alert and oriented to person, place, and time           Medications:    Current Facility-Administered Medications:     acetaminophen (TYLENOL) tablet 650 mg, 650 mg, Oral, Q6H PRN, Sinda Michael, DO    apixaban (ELIQUIS) tablet 2 5 mg, 2 5 mg, Oral, BID, Sinda Michael, DO, 2 5 mg at 07/21/22 0649    atorvastatin (LIPITOR) tablet 40 mg, 40 mg, Oral, Daily, Lianet Schaefer MD, 40 mg at 07/21/22 1768    clotrimazole (LOTRIMIN) 1 % cream, , Topical, BID, Lianet Schaefer MD, Given at 07/21/22 0196    docusate sodium (COLACE) capsule 100 mg, 100 mg, Oral, BID, Lianet Schaefer MD, 100 mg at 07/21/22 0922    doxercalciferol (HECTOROL) injection 2 5 mcg, 2 5 mcg, Intravenous, After Dialysis, Domenica Borjas MD, 2 5 mcg at 07/20/22 0826    epoetin uday (EPOGEN,PROCRIT) injection 4,000 Units, 4,000 Units, Intravenous, After Dialysis, Domenica Borjas MD, 4,000 Units at 07/20/22 6367    gabapentin (NEURONTIN) capsule 100 mg, 100 mg, Oral, HS, Gopi Goldstein MD, 100 mg at 07/20/22 2148    heparin (porcine) injection 1,000 Units, 1,000 Units, Intravenous, Once PRN, Domenica Borjas MD    lactulose oral solution 20 g, 20 g, Oral, Daily, Gopi Goldstein MD, 20 g at 07/21/22 8126    lidocaine (LMX) 4 % cream, , Topical, Before Dialysis, Domenica Borjas MD    melatonin tablet 3 mg, 3 mg, Oral, HS, Artie [place], DO, 3 mg at 07/20/22 2148    metoprolol succinate (TOPROL-XL) 24 hr tablet 12 5 mg, 12 5 mg, Oral, BID, Gopi Goldstein MD    midodrine (PROAMATINE) tablet 10 mg, 10 mg, Oral, TID AC, Gopi Goldstein MD, 10 mg at 07/21/22 0549    midodrine (PROAMATINE) tablet 10 mg, 10 mg, Oral, Before Dialysis, Domenica Borjas MD, 10 mg at 07/20/22 0715    oxyCODONE (ROXICODONE) IR tablet 2 5 mg, 2 5 mg, Oral, Q6H PRN, Beatriz Barefoot, DO, 2 5 mg at 07/20/22 2223    pantoprazole (PROTONIX) EC tablet 40 mg, 40 mg, Oral, Early Morning, Gopi Goldstein MD, 40 mg at 07/21/22 0549    trimethobenzamide (TIGAN) IM injection 200 mg, 200 mg, Intramuscular, Q6H PRN, Beatriz Barefoot, DO, 200 mg at 07/20/22 1639    Laboratory Results:  Results from last 7 days   Lab Units 07/21/22  0554 07/20/22  0615 07/20/22  0521 07/19/22  1116 07/19/22  0748 07/18/22  2340 07/18/22  1051   WBC Thousand/uL 5 92 5 27  --   --  5 59 5 89 5 59   HEMOGLOBIN g/dL 7 2* 7 2*  --   --  7 4* 7 7* 7 9*   HEMATOCRIT % 21 3* 22 1*  --   --  22 5* 23 8* 24 8*   PLATELETS Thousands/uL 78* 80*  --   --  78* 92* 98*   POTASSIUM mmol/L 4 2  --  4 4 3 9  --  3 5  --    CHLORIDE mmol/L 95*  --  96 95*  --  100  --    CO2 mmol/L 27  --  24 29  --  25  --    BUN mg/dL 39* --  42* 32*  --  25  --    CREATININE mg/dL 5 66*  --  6 18* 4 81*  --  4 40*  --    CALCIUM mg/dL 8 4  --  8 6 8 4  --  7 7*  --        PLEASE NOTE:  This encounter was completed utilizing the [employer] Direct Speech Voice Recognition Software  Grammatical errors, random word insertions, pronoun errors and incomplete sentences are occasional consequences of the system due to software limitations, ambient noise and hardware issues  These may be missed by proof reading prior to affixing electronic signature  Any questions or concerns about the content, text or information contained within the body of this dictation should be directly addressed to the physician for clarification  Please do not hesitate to call me directly if you have any any questions or concerns

## 2022-07-21 NOTE — ASSESSMENT & PLAN NOTE
Lab Results   Component Value Date    EGFR 7 07/20/2022    EGFR 10 07/19/2022    EGFR 11 07/18/2022    CREATININE 6 18 (H) 07/20/2022    CREATININE 4 81 (H) 07/19/2022    CREATININE 4 40 (H) 07/18/2022   · HD M/W/F  · Some hypotension and chest discomfort today while in dialysis  · Dialysis stopped, symptoms resolved    Last dialysis session 7/18/22  Patient on tri weekly schedule of Monday Wednesday and Friday

## 2022-07-21 NOTE — DISCHARGE INSTR - AVS FIRST PAGE
Dear Floyd Lizama,     It was our pleasure to care for you here at [facility]  It is our hope that we were always able to exceed the expected standards for your care during your stay  You were hospitalized due to chest pain  You were cared for on the 3rd floor by Ric Randall DO under the service of Irma Lea DO with the [facility] who covers for your primary care physician (PCP), EL Alvarado, while you were hospitalized  If you have any questions or concerns related to this hospitalization, you may contact us at [phone]  For follow up as well as any medication refills, we recommend that you follow up with your primary care physician  A registered nurse will reach out to you by phone within a few days after your discharge to answer any additional questions that you may have after going home  However, at this time we provide for you here, the most important instructions / recommendations at discharge:     Notable Medication Adjustments -   No changes to your home medications  Testing Required after Discharge -   None  Important follow up information -   Please follow up with your primary care physician in 1 week  Please continue your regularly scheduled dialysis sessions  Please establish care with a gastroenterologist  A referral has been made for you  If you do not get a call in the next few days from the office, please call the number for Dr Elina Welsh office given above  Other Instructions -   None  Please review this entire after visit summary as additional general instructions including medication list, appointments, activity, diet, any pertinent wound care, and other additional recommendations from your care team that may be provided for you        Sincerely,     Ric Randall DO

## 2022-07-21 NOTE — ASSESSMENT & PLAN NOTE
Patient reports episode of chest pain, with left arm pain and SOB lasting approximately 10 minutes occurred 5:00 p m  On 07/18  Patient is dated symptoms have not recurred since      Trop: 57 -67- 71  No ischemic changes on ekg  Possibly in the setting of ascites  Resolved

## 2022-07-22 NOTE — UTILIZATION REVIEW
Notification of Discharge   This is a Notification of Discharge from our facility [address]  Please be advised that this patient has been discharge from our facility  Below you will find the admission and discharge date and time including the patients disposition  UTILIZATION REVIEW CONTACT:  Dawna Lucia  Utilization   Network Utilization Review Department  Phone: [phone] x carefully listen to the prompts  All voicemails are confidential   Email: [email or web address]     PHYSICIAN ADVISORY SERVICES:  FOR [id number]-WA-QHZH REVIEW - MEDICAL NECESSITY DENIAL  Phone: [phone]  Fax: [phone]  Email: [email or web address]     PRESENTATION DATE: 7/18/2022 11:12 PM  OBERVATION ADMISSION DATE:   INPATIENT ADMISSION DATE: 7/19/22 12:47 AM   DISCHARGE DATE: 7/21/2022  2:21 PM  DISPOSITION: Home/Self Care Home/Self Care      IMPORTANT INFORMATION:  Send all requests for admission clinical reviews, approved or denied determinations and any other requests to dedicated fax number below belonging to the campus where the patient is receiving treatment   List of dedicated fax numbers:  [address] DENIALS (Administrative/Medical Necessity) [phone]   [address] (Maternity/NICU/Pediatrics) [phone]   Ariel Walker [phone]   130 [facility] [phone]   [address] [place] [address] [phone]   [address] [place] [address],[address] [phone]   [facility]  [phone]   [address] [place] [address], [facility]  2401 [facility] 1000 [address] [facility] [phone]

## 2022-07-22 NOTE — UTILIZATION REVIEW
Inpatient Admission Authorization Request   NOTIFICATION OF INPATIENT ADMISSION/INPATIENT AUTHORIZATION REQUEST   SERVICING FACILITY:   [facility]  Address: [address] [facility], [address] [address]  Tax ID: [id number]  NPI: [id number]  Place of Service: Inpatient [address]  Place of Service Code: 24     ATTENDING PROVIDER:  Attending Name and NPI#: Dea Chand [[id number]]  Address: [address] [place] [address], [address] [place] [address]  Phone: [phone]     UTILIZATION REVIEW CONTACT:  Nicole oJ Utilization   Network Utilization Review Department  Phone: [phone]  Fax: [phone]  Email: [email or web address]     PHYSICIAN ADVISORY SERVICES:  FOR [id number]-VT-[id number] REVIEW - MEDICAL NECESSITY DENIAL  Phone: [phone]  Fax: [phone]  Email: [email or web address]     TYPE OF REQUEST:  Inpatient Status     ADMISSION INFORMATION:  ADMISSION DATE/TIME: 7/19/22 12:47 AM  PATIENT DIAGNOSIS CODE/DESCRIPTION:  Shortness of breath [R06 02]  CHF (congestive heart failure) (HCC) [I50 9]  Hyperammonemia (HCC) [E72 20]  Chest pain [R07 9]  Hypoxia [R09 02]  Elevated troponin [R77 8]  ESRD (end stage renal disease) on dialysis ([facility] [address] ) [N18 6, Z99 2]  Elevated lactic acid level [R79 89]  DISCHARGE DATE/TIME: 7/21/2022  2:21 PM   IMPORTANT INFORMATION:  Please contact Nicole Jo directly with any questions or concerns regarding this request  Department voicemails are confidential     Send requests for admission clinical reviews, concurrent reviews, approvals, and administrative denials due to lack of clinical to fax [phone]

## 2022-07-25 NOTE — PROGRESS NOTES
Z99 2 is on the problem list and already billed this year-[facility] [address]  coding opportunities       Chart reviewed, no opportunity found: CHART REVIEWED, NO OPPORTUNITY FOUND        Patients Insurance

## 2023-04-21 NOTE — ASSESSMENT & PLAN NOTE
Group Topic: BH Process Group     Date: 4/21/2023  Start Time: 0930  End Time: 1000  Facilitators: Tito Ponce    Focus: Community group.  Number in attendance: 13      Method: Group  Attendance: Present  Participation: Active  Patient Response: Appropriate feedback  Mood: Normal  Affect: Type: Euthymic (normal mood)   Range: Full (normal)   Congruency: Congruent   Stability: Stable  Behavior/Socialization: Appropriate to group  Thought Process: Demonstrated insight  Task Performance: Follows directions  Patient Evaluation: Independent - full participation         · Continue melatonin  · Continue Trazodone HS

## 2023-08-31 NOTE — DISCHARGE INSTRUCTIONS
DISCHARGE INSTRUCTIONS      DIALYSIS GRAFT SURGERY    Following discharge from the hospital, you may have some questions about your operation, your activities or your general condition  These instructions may answer some of your questions and help you adjust during the first few weeks following your operation  ACTIVITY:  Limit use of the operated arm to what is absolutely necessary for the first day after surgery  On the second day after surgery, you may start to increase use of your arm as tolerated  One week after surgery, you should start to exercise your hand on the side of the graft by squeezing a ball  This increases blood flow in your graft and arm so your graft will function better  DIET:   Resume your normal diet  Try to eat low fat and low cholesterol foods  DRESSING:   The dressing may be removed on the third day following surgery  INCISION:   You may include the operated area in a shower on the fourth day following surgery  It is normal to have some pain at the surgical site  You will receive a prescription for pain medicine at the time of discharge  There will also be some swelling and bruising around the surgical site  If increasing redness or pain develops at the incision or severe pain, numbness or weakness occurs in the hand, call our office immediately  Numbness in the region of the incision may occur following the surgery  This normally resolves in six to twelve months  ARM SWELLING:    Most patients have some noticeable arm swelling after surgery  This usually disappears within a few weeks  If swelling is present, elevate the arm whenever possible  RESTRICTIONS:   Do not have blood draws, IVs, or blood pressures performed on the operated arm  GRAFT USE:    Your graft will be used for dialysis after the pain and swelling has diminished  This is usually about two weeks after graft placement    If you had a fistula placed it will not be used for six to 12 weeks     PLEASE CALL THE OFFICE IF YOU HAVE ANY QUESTIONS  [phone] [facility] [phone] [facility] FREE [phone]  [address] [place] [address] , [address], [facility], [address]  [address], [address], [facility], 210 [facility]  [address], [place] [address]  611 [facility], [place], [address] [facility] [address]  [address], 1st Floor, David [address]rola 34  [facility] 19, [address] [facility], [address] [facility] [address], [facility], [facility] 97   [address] [facility], [address] [facility], [facility] [place], [address] [facility]  One [facility], 532 [facility], [facility],[address], Aniyah Dykes 6 Consent (Scalp)/Introductory Paragraph: The rationale for Mohs was explained to the patient and consent was obtained. The risks, benefits and alternatives to therapy were discussed in detail. Specifically, the risks of changes in hair growth pattern secondary to repair, infection, scarring, bleeding, prolonged wound healing, incomplete removal, allergy to anesthesia, nerve injury and recurrence were addressed. Prior to the procedure, the treatment site was clearly identified and confirmed by the patient. All components of Universal Protocol/PAUSE Rule completed.

## 2023-09-02 NOTE — SOCIAL WORK
"[facility]  Hospitalist Discharge Summary      Date of Admission:  8/23/2023  Date of Discharge:  9/2/2023  Discharging Provider: Jose Luther MD  Discharge Service: Hospitalist Service    Discharge Diagnoses   LLL pneumonia with septic shock and acute hypoxic respiratory failure  COPD with ongoing tobacco use  Acute septic, metabolic and toxic encephalopathy  Vulnerable adult  Weakness and physical deconditioning due to multiple acute and chronic medical issues  Diarrhea, resolved  Abdominal pain  Possible pancreas abnormality  Elevated LFTs, unclear etiology, question hypoperfusion from sepsis, alcohol use  Hypertension (benign essential)  Chronic CHF (HFpEF)  Anemia, suspect dilutional and/or chronic  Coagulopathy, suspect due to decreased PO intake  Alcohol use  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    Weight as of this encounter: 66 kg (145 lb 8.1 oz).       Follow-ups Needed After Discharge   Follow-up Appointments     Follow-up and recommended labs and tests       Follow up with primary care provider, Sylvester Davidson, within 7 days   for hospital follow- up.  The following labs/tests are recommended: CBC   and BMP.        -Consider Suboxone initiation    Unresulted Labs Ordered in the Past 30 Days of this Admission       No orders found from 7/24/2023 to 8/24/2023.            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  Transferred to [facility] ICU 8/22/2023 for management.  Patient was treated with antibiotics and pressors and subsequently improved.  Patient was transferred to the IM service 8/23/2023.    LLL pneumonia with septic shock and acute hypoxic respiratory failure  COPD with ongoing tobacco use  Possible UTI  Initial presentation to OSH as above. Also had UA with 11-25 WBC, small blood, small LE though mod epithelial cells, blood.  Transferred to [facility] 8/22/23.  Started on vanco, pipericillin-tazobactam and doxycycline on admit. MRSA nasal PCR negative. BC's  NGTD.   On 8/23, weaned off pressors and transferred to IM team. Vanco stopped. Later in the afternoon, became hypotensive again requiring more fluids and pressors, weaned off in the evening.  Gradually improved and transferred out of ICU.  Vancomycin discontinued on 8/23/23.  Completed 5 day course of doxycycline on 8/27/23.  Completed a 7 day course of Zosyn on 8/29/23.  Weaned off supplemental oxygen on 9/1/23. She was not on home oxygen prior to admission.  Continue Incruse Ellipta (formulary substitute for spiriva) and PRN DuoNebs.  Blood cultures negative to date.  Sputum culture with normal adam.  From 9/1 hospitalist: \"TCU recommended for ongoing cares and therapies after discharge, however she is refusing this. There is concern that she is a fall risk, concern about her fragile respiratory status, need for wound care, etc. Her significant other has stated to nursing that he thinks she should go to TCU. She specifically told me not to contact her significant other despite him calling in and requesting an update from the hospitalist. She wants to go home. States her significant other wants her home and will come pick her up. Requested to be discharged tonight. After concerns about discharge home, she seems to understand the risks " "and still wants to be discharge. Discharge orders were entered, however we subsequently found out that her significant other can't pick her up tonight, but could pick her up tomorrow. Discharge cancelled for tonight. Likely discharge tomorrow unless she changes her mind about TCU or clinical status changes.\"  On day of discharge, patient again refusing TCU. Would like to go home. Significant other coming later today to pick her up. She appears to have appropriate decision making capacity on my exam (similar to past hospitalist notes).    Acute septic, metabolic and toxic encephalopathy, improved  Presented with confusion and decreased LOC. EtOH level in ED 0.048. CT head negative for acute findings.  Mental status improved. She has been conversant, A&O. Drowsy following opioids. Neuro intact.  Appears to be back to baseline on 9/1/23. Oriented and able to discuss medical condition and plan of care, although doesn't want to engage much.  Re-oriented as needed.  Maintained normal day/night, sleep/wake cycles.  Minimized sedating medications as able.    Vulnerable adult  * Per report, arrived to ED covered in stool. Chart review indicated prior admission after laying on the floor for several days, concern for unsafe living situation and history of multiple vulnerable adult reports filed.   SW following.  See below regarding discharge planning.    Weakness and physical deconditioning due to multiple acute and chronic medical issues  PT and OT consulted, recommending TCU.  Encouraged participation with therapies in the hospital and consideration of TCU at the time of discharge.  Per 9/1 hospitalist: \"She is refusing TCU. She states her significant other wants her to come home and she will go to outpatient therapy that is just a couple miles from their house. She has told me on multiple occasions not to contact her significant other regarding discharge planning. She is alert and oriented, able to convey my concerns back " "to me, and appears to understand the concerns about her discharging home.\"    Diarrhea, resolved  Abdominal pain  Possible pancreas abnormality  Multiple loose stools on morning of 8/28/23. Also complains of abdominal pain. No fevers. WBC with in normal limits. Had been on Zosyn for the past 7 days.  Stool for c.diff was ordered 8/28/23, but no further loose stools so testing was cancelled.  CT abdomen/pelvis on 8/29/23 showed questionable fullness in the region of the pancreatic neck.  Lipase within normal limits.  GI consulted, appreciate their assistance.  Outpatient follow-up with Lucille HERNANDEZ. Consider EGD/EUS as an outpatient when recovered from acute medical issues.    Elevated LFTs, unclear etiology, question hypoperfusion from sepsis, alcohol use  * Patient is s/p cholecystectomy.  Continue to treat other issues as noted.  LFTs improved, monitor intermittently.    Hypertension (benign essential)  Chronic CHF (HFpEF)  [PTA: metoprolol 50 mg BID.]  * ECHO 3/2022 showed LV EF 73% with normal RV function.   * BNP 8/22/23 in ED elevated at 10,700, not clinically volume up. Issues with septic shock as noted. Metoprolol held on admit.  BP's more elevated 8/25, PTA metoprolol restarted.  Blood pressure better controlled, continue to monitor.  PRN IV hydralazine available for high blood pressure.  Monitor I&O and daily weights.    Anemia, suspect dilutional and/or chronic  * Baseline hemoglobin around 14-15 earlier this year. Hgb 10.2 on admit 8/23. No overt clinical signs of major bleeding.  Hemoglobin stable/improved at 10.6 on 8/31/23.  Monitor intermittently.    Coagulopathy, suspect due to decreased PO intake  * INR 1.45 on admit 8/23.  INR improved to 1.18 on 8/25/23.    Alcohol use  On 8/23, patient reported periodic drinking at home, denied daily use or history of withdrawal. Etoh level on admission 0.048 as above. No overt withdrawal noted.  No significant withdrawal noted during this " hospitalization.    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 Last seen 7/31/2023.  * On admit, wound did not appears acutely infected.  WOC consulted 8/23.  Continue local wound cares per WOC RN.  Outpatient follow-up with general surgery as previously arranged.    Chronic pain syndrome  Per chart review, there was some concern for misuse. Recently switched from oxycodone to morphine IR at appt 8/9. PTA: morphine 15mg q4 hours PRN, however also was taking prior oxycodone until it runs out. PTA also on lidocaine patch. Gabapentin on med list but patient not taking.  Pain team consulted, appreciate their assistance.  Continue lidocaine 4% patch q24h.  PRN acetaminophen available.  Previously on gabapentin, but states it did not work for her. Discussed trying Lyrica, however she was not interested.  Minimize opioids as able.  Discontinued IV dilaudid 8/31/23.  Continue PRN morphine, decrease frequency to every 6 hours PRN.  Recommend outpatient initiation of buprenorphine as recommended in the pain team consult note.     GERD  Continue PTA PPI and sucralfate.    Hypokalemia  Hypomagnesemia  Hypophosphatemia  Resolved with replacement.    Hypocalcemia  * Given IV calcium 8/23.  Resolved with replacement.    Lines, etc.  * Midline placed 8/24.  Remove prior to discharge.    Consultations This Hospital Stay   WOUND OSTOMY CONTINENCE NURSE  IP CONSULT  PHARMACY TO DOSE VANCO  SOCIAL WORK IP CONSULT  VASCULAR ACCESS ADULT IP CONSULT  VASCULAR ACCESS ADULT IP CONSULT  VASCULAR ACCESS ADULT IP CONSULT  CARE MANAGEMENT / SOCIAL WORK IP CONSULT  PHYSICAL THERAPY ADULT IP CONSULT  OCCUPATIONAL THERAPY ADULT IP CONSULT  PAIN MANAGEMENT ADULT IP CONSULT  PSYCHIATRY IP CONSULT  GASTROENTEROLOGY IP CONSULT  VASCULAR ACCESS ADULT IP CONSULT    Code Status   Full Code    Time Spent on this Encounter   Jose HOGUE  MD Della, personally saw the patient today and spent less than or equal to 30 minutes discharging this patient.       Jose Luther MD  [place] [id number] [facility]  [address] [place] [address]  Phone: [phone]  ______________________________________________________________________    Physical Exam   Vital Signs: Temp: 98  F (36.7  C) Temp src: Oral BP: 119/76 Pulse: 100   Resp: 16 SpO2: 91 % O2 Device: Nasal cannula Oxygen Delivery: 2 LPM  Weight: 145 lbs 8.06 oz    Constitutional: awake, alert, no apparent distress, laying in the hospital bed, dismissive, does not want to talk much  Respiratory: no increased work of breathing, diminished at the bases  Cardiovascular: regular rate and rhythm, normal S1 and S2, no murmur noted  GI: normal bowel sounds, soft, non-distended, upper abdominal tenderness  Skin: warm, dry  Musculoskeletal: no lower extremity pitting edema present  Neurologic: awake, alert, answers questions appropriately, moves all extremities       Primary Care Physician   Sylvester Davidson    Discharge Orders      Physical Therapy Referral      Reason for your hospital stay    Left lower lobe pneumonia with septic shock and acute hypoxic respiratory failure     Follow-up and recommended labs and tests     Follow up with primary care provider, Sylvester Davidson, within 7 days for hospital follow- up.  The following labs/tests are recommended: CBC and BMP.     Activity    Your activity upon discharge: activity as tolerated with walker and assistance     Wound care and dressings    Instructions to care for your wound at home:  Right hip wound(s): Daily and prn:  1. Cleanse wound and surrounding skin with Vashe-moist gauze, then let dry.  Swab out wound with qtip, identifying the depth and direction of the undermining (where you will need to pack).   2. Swab periwound with no-sting skin barrier film, let dry.  3. Pack wound with Vashe-moistened Mesalt ribbon (#  [id number]), using wooden end of qtip.  Leave a small tail sticking out for easy removal.   4. Cover with Mepilex 4x4 or gauze and Medipore tape.  5. Pressure injury prevention measures.     Diet    Follow this diet upon discharge: Regular Diet Adult       Significant Results and Procedures   Results for orders placed or performed during the hospital encounter of 08/23/23   XR Chest Port 1 View    Narrative    EXAM: XR CHEST PORT 1 VIEW  LOCATION: [facility]  DATE: 8/28/2023    INDICATION: chest pain  COMPARISON: None.      Impression    IMPRESSION: Heart size within normal limits. Retrocardiac atelectasis or consolidation and small left pleural effusion. Calcified right basilar granuloma. No visible pneumothorax. Partially imaged right shoulder arthroplasty. Calcified joint bodies in   the left axillary recess. IV catheter tubing ejects over the left axilla. Partially imaged lumbar spine postoperative change.   CT Abdomen Pelvis w/o Contrast    Narrative    EXAM: CT ABDOMEN PELVIS W/O CONTRAST  LOCATION: [facility]  DATE: 8/29/2023    INDICATION: severe upper abdominal pain, poor appetite, WBC normal, intermittent mild fevers, two loose stools yesterday   none since then  COMPARISON: None.  TECHNIQUE: CT scan of the abdomen and pelvis was performed without IV contrast. Multiplanar reformats were obtained. Dose reduction techniques were used.  CONTRAST: None.    FINDINGS:   LOWER CHEST: Airspace consolidation in the left lower lobe with patchy areas of groundglass opacities anteriorly in the lung bases and atelectasis versus infiltrate in the right lower lobe along the diaphragm. Small left pleural effusion. Moderate -   sized hiatal hernia. Evidence of prior granulomatous disease.    HEPATOBILIARY: Absent gallbladder. A few calcified hepatic granulomas.    PANCREAS: Question of some fullness in the region of the pancreatic neck as seen on axial image 79. No  peripancreatic inflammatory change.    SPLEEN: Scattered calcified granulomas.    ADRENAL GLANDS: Normal.    KIDNEYS/BLADDER: Normal.    BOWEL: No mechanical bowel obstruction or free air. Appendix not visualized with certainty.    LYMPH NODES: Normal.    VASCULATURE: Aortoiliac atherosclerotic calcification without aneurysm.    PELVIC ORGANS: Uterus is atrophic or absent. No free fluid.    MUSCULOSKELETAL: Severe osteopenia. Lumbosacral fusion from L2-S1. There is some lucency along the screw tracts in the S1 vertebral body as seen on coronal image 73. A spinal catheter is in place. Severe compression fracture deformity at T12 with   moderate compression deformities at T8 and T10. There is skin thickening along a suspected ulceration at the lateral aspect of the right hip superficial to the greater trochanter as seen on image 180 of series 3 and image 57 of series 9.      Impression    IMPRESSION:   1.  Left lower lobe pneumonia. Additional opacities in the lung bases may reflect further pneumonia.    2.  Suspected soft tissue ulceration along the lateral aspect of the right hip. Recommend correlation with physical exam. No underlying osseous destruction.    3.  Severe osteopenia with extensive spinal fusion changes. Small amount of osteolysis along the fixation screws in the S1 vertebral body may reflect a component of loosening.    4.  Questionable fullness in the region of the pancreatic neck, difficult to fully evaluate on this noncontrast study particularly given the beam hardening artifact from the spinal hardware. A follow-up examination with IV contrast would be of benefit   when clinically feasible.         Discharge Medications   Current Discharge Medication List        START taking these medications    Details   acetaminophen (TYLENOL) 325 MG tablet Take 2 tablets (650 mg) by mouth every 8 hours as needed for mild pain    Associated Diagnoses: Pain           CONTINUE these medications which have CHANGED     Details   morphine (MSIR) 15 MG IR tablet Take 1 tablet (15 mg) by mouth every 6 hours as needed for pain  Refills: 0           CONTINUE these medications which have NOT CHANGED    Details   albuterol (PROAIR HFA/PROVENTIL HFA/VENTOLIN HFA) 108 (90 Base) MCG/ACT inhaler Inhale 1-2 puffs into the lungs every 4 hours (while awake)      hydrOXYzine (VISTARIL) 25 MG capsule Take 50 mg by mouth At Bedtime      lidocaine (LIDODERM) 5 % patch Place onto the skin every 24 hours To prevent lidocaine toxicity, patient should be patch free for 12 hrs daily.      metoprolol tartrate (LOPRESSOR) 50 MG tablet Take 50 mg by mouth 2 times daily      omeprazole (PRILOSEC) 20 MG DR capsule Take 20 mg by mouth daily      sucralfate (CARAFATE) 1 GM tablet Take 1 g by mouth 4 times daily      tiotropium (SPIRIVA) 18 MCG inhaled capsule Inhale 18 mcg into the lungs daily           STOP taking these medications       gabapentin (NEURONTIN) 300 MG capsule Comments:   Reason for Stopping:         oxyCODONE IR (ROXICODONE) 10 MG tablet Comments:   Reason for Stopping:             Allergies   Allergies   Allergen Reactions    Brilliant Green     Diatrizoate Meglumine [Diatrizoate]      rash    Gentian Violet     Ibuprofen     Nsaids      Bleeding    Proflavine

## (undated) DEVICE — BAG DECANTER

## (undated) DEVICE — SUT ETHIBOND 2-0 SH-1/SH-1 30 IN X763H

## (undated) DEVICE — VESSEL LOOPS X-RAY DETECTABLE: Brand: DEROYAL

## (undated) DEVICE — SUT PROLENE 4-0 D-SPECIAL CUSTOM KIT D7160

## (undated) DEVICE — ELECTRODE BLADE E-Z CLEAN 4IN -0014A

## (undated) DEVICE — SUT SILK 3-0 SH CR/8 18 IN C013D

## (undated) DEVICE — SUT 2 ORTHOCORD 36 IN W/O NEEDLES

## (undated) DEVICE — GAUZE SPONGES,16 PLY: Brand: CURITY

## (undated) DEVICE — HEMOSTATIC MATRIX SURGIFLO 8ML W/THROMBIN

## (undated) DEVICE — INTENDED FOR TISSUE SEPARATION, AND OTHER PROCEDURES THAT REQUIRE A SHARP SURGICAL BLADE TO PUNCTURE OR CUT.: Brand: BARD-PARKER SAFETY BLADES SIZE 15, STERILE

## (undated) DEVICE — LIGACLIP MCA MULTIPLE CLIP APPLIERS, 20 MEDIUM CLIPS: Brand: LIGACLIP

## (undated) DEVICE — EVERGRIP INSERT SET 86MM: Brand: FOGARTY EVERGRIP

## (undated) DEVICE — LIGACLIP MCA MULTIPLE CLIP APPLIERS, 20 SMALL CLIPS: Brand: LIGACLIP

## (undated) DEVICE — SUT PDS PLUS 1 CTB 36 IN PDPB359T

## (undated) DEVICE — INTENDED FOR TISSUE SEPARATION, AND OTHER PROCEDURES THAT REQUIRE A SHARP SURGICAL BLADE TO PUNCTURE OR CUT.: Brand: BARD-PARKER SAFETY BLADES SIZE 10, STERILE

## (undated) DEVICE — SUT PROLENE 6-0 BV130 30 IN 8709H

## (undated) DEVICE — CAPIT ASCEND FLEX REVERSE W/GLENOID

## (undated) DEVICE — ANTIBACTERIAL UNDYED BRAIDED (POLYGLACTIN 910), SYNTHETIC ABSORBABLE SUTURE: Brand: COATED VICRYL

## (undated) DEVICE — 3000CC GUARDIAN II: Brand: GUARDIAN

## (undated) DEVICE — SUT POLYESTER TAPE D-G 8618-00

## (undated) DEVICE — PLUMEPEN PRO 10FT

## (undated) DEVICE — 32 FR STRAIGHT – SOFT PVC CATHETER: Brand: PVC THORACIC CATHETERS

## (undated) DEVICE — HOOD: Brand: FLYTE, SURGICOOL

## (undated) DEVICE — PLEDGET CARDIO PTFE 9.5 X 4.8 SOFT LF (6EA/PK)

## (undated) DEVICE — HEMOSTAT POWDER ADSORB SURGICEL 3GM

## (undated) DEVICE — INTENDED FOR TISSUE SEPARATION, AND OTHER PROCEDURES THAT REQUIRE A SHARP SURGICAL BLADE TO PUNCTURE OR CUT.: Brand: BARD-PARKER ® CARBON RIB-BACK BLADES

## (undated) DEVICE — SUT MONOCRYL 4-0 PS-2 18 IN Y496G

## (undated) DEVICE — RECIP.STERNUM SAW BLADE 34/7.5/0.7MM: Brand: AESCULAP

## (undated) DEVICE — SUT PROLENE 4-0 BB 36 IN 8581H

## (undated) DEVICE — UMBILICAL TAPE: Brand: DEROYAL

## (undated) DEVICE — SUT SILK 0 CT-1 30 IN 424H

## (undated) DEVICE — EVERGRIP INSERT SET 61MM: Brand: FOGARTY EVERGRIP

## (undated) DEVICE — PACK VALVE PBDS

## (undated) DEVICE — CAUTERY DISPOSABLE VARISTAT

## (undated) DEVICE — BONE WAX WHITE: Brand: BONE WAX WHITE

## (undated) DEVICE — IMPERVIOUS STOCKINETTE: Brand: DEROYAL

## (undated) DEVICE — SUT SILK 4-0 18 IN A183H

## (undated) DEVICE — THE SIMPULSE SOLO SYSTEM WITH ULTREX RETRACTABLE SPLASH SHIELD TIP: Brand: SIMPULSE SOLO

## (undated) DEVICE — ECHELON FLEX  POWERED VASCULAR STAPLER WITH ADVANCED PLACEMENT TIP, 35MM: Brand: ECHELON FLEX

## (undated) DEVICE — Device

## (undated) DEVICE — SUT PROLENE 5-0 RB-1/RB-1 36 IN 8556H

## (undated) DEVICE — DRESSING MEPILEX AG BORDER 4 X 4 IN

## (undated) DEVICE — 2000CC GUARDIAN II: Brand: GUARDIAN

## (undated) DEVICE — GLOVE SRG BIOGEL ECLIPSE 8

## (undated) DEVICE — ENDOPATH ECHELON VASCULAR  RELOADS, WHITE, 35MM: Brand: ECHELON ENDOPATH

## (undated) DEVICE — 32 FR RIGHT ANGLE – SOFT PVC CATHETER: Brand: PVC THORACIC CATHETERS

## (undated) DEVICE — GLOVE SRG BIOGEL ORTHOPEDIC 7.5

## (undated) DEVICE — DRESSING ALLEVYN LIFE HEEL 25 X 25.2CM

## (undated) DEVICE — STRL BETHLEHEM A V FISTULA PK: Brand: CARDINAL HEALTH

## (undated) DEVICE — SUT VICRYL PLUS 2-0 CTB-1 27 IN VCPB259H

## (undated) DEVICE — CATH STRAIGHT RED RUBBER 20 FR

## (undated) DEVICE — TRAY FOLEY 16FR SURESTEP TEMP SENS URIMETER STAT LOK

## (undated) DEVICE — KIT STABILIZATION SHOULDER MARCO

## (undated) DEVICE — OASIS DRAIN, SINGLE, INLINE & ATS COMPATIBLE: Brand: OASIS

## (undated) DEVICE — BARD® PTFE FELT, 2.5 CM X 15.2 CM
Type: IMPLANTABLE DEVICE | Site: AORTA | Status: NON-FUNCTIONAL
Brand: BARD® PTFE FELT

## (undated) DEVICE — PETRI DISH STERILE

## (undated) DEVICE — SUT MONOCRYL 3-0 SH 27 IN Y416H

## (undated) DEVICE — 3M™ IOBAN™ 2 ANTIMICROBIAL INCISE DRAPE 6650EZ: Brand: IOBAN™ 2

## (undated) DEVICE — CHLORAPREP HI-LITE 26ML ORANGE

## (undated) DEVICE — DRESSING MEPILEX AG BORDER 4 X 8 IN

## (undated) DEVICE — GUIDE PIN 2.5 X 220MM AEQUALIS PERFORM PLUS

## (undated) DEVICE — ASTOUND STANDARD SURGICAL GOWN, XXL: Brand: CONVERTORS

## (undated) DEVICE — PROXIMATE PLUS MD MULTI-DIRECTIONAL RELEASE SKIN STAPLERS CONTAINS 35 STAINLESS STEEL STAPLES APPROXIMATE CLOSED DIMENSIONS: 6.9MM X 3.9MM WIDE: Brand: PROXIMATE

## (undated) DEVICE — SUTURE GUIDE

## (undated) DEVICE — SUCTION CATH 18 FR

## (undated) DEVICE — DRILL BIT 3.2MM

## (undated) DEVICE — PACK MAJOR ORTHO W/SPLITS PBDS

## (undated) DEVICE — LIGHT HANDLE COVER SLEEVE DISP BLUE STELLAR

## (undated) DEVICE — SUT PROLENE 5-0 C-1/C-1 36 IN 8321H

## (undated) DEVICE — DRESSING ALLEVYN LIFE SACRAL 6.75 X 6.5 IN

## (undated) DEVICE — GLOVE SRG BIOGEL 7.5

## (undated) DEVICE — SURGICEL FIBRILLAR 1 X 2

## (undated) DEVICE — SPONGE PVP SCRUB WING STERILE

## (undated) DEVICE — SUT 2 ORTHOCORD MO7

## (undated) DEVICE — SILVER-COATED ANTIBACTERIAL BARRIER DRESSING: Brand: ACTICOAT SURGIC 10X12CM 5PK US

## (undated) DEVICE — SUT SILK 2 60 IN SA8H

## (undated) DEVICE — BLANKET HYPOTHERMIA ADULT GAYMAR

## (undated) DEVICE — HEAVY DUTY TABLE COVER: Brand: CONVERTORS

## (undated) DEVICE — FILTER SMOKE EVAC VIROSAFE

## (undated) DEVICE — PAD GROUNDING ADULT

## (undated) DEVICE — INTENT TO BE USED WITH SUTURE MATERIAL FOR TISSUE CLOSURE: Brand: RICHARD-ALLAN®  NEEDLE 1/2 CIRCLE REVERSE CUTTING

## (undated) DEVICE — SILVER-COATED ANTIBACTERIAL BARRIER DRESSING: Brand: ACTICOAT SURGIC 10X20CM 5PK US

## (undated) DEVICE — SUT VICRYL PLUS 0 CTB-1 27 IN VCPB260H

## (undated) DEVICE — GLOVE INDICATOR PI UNDERGLOVE SZ 7.5 BLUE

## (undated) DEVICE — THERMOFLECT BLANKET, L, 25EA                               TS THERMOFLECT BLANKET, 48" X 84", SILVER, 5/BG, 5 BG/CS NW: Brand: THERMOFLECT

## (undated) DEVICE — GLOVE INDICATOR PI UNDERGLOVE SZ 8 BLUE

## (undated) DEVICE — 40601 PROLONGED POSITIONING SYSTEM: Brand: 40601 PROLONGED POSITIONING SYSTEM

## (undated) DEVICE — STERNAL WIRE

## (undated) DEVICE — SUT SILK 2-0 18 IN A185H

## (undated) DEVICE — ADHESIVE SKIN HIGH VISCOSITY EXOFIN 1ML

## (undated) DEVICE — DUAL CUT SAGITTAL BLADE